# Patient Record
Sex: FEMALE | Race: WHITE | Employment: OTHER | URBAN - METROPOLITAN AREA
[De-identification: names, ages, dates, MRNs, and addresses within clinical notes are randomized per-mention and may not be internally consistent; named-entity substitution may affect disease eponyms.]

---

## 2017-01-03 PROBLEM — E87.6 HYPOKALEMIA: Status: ACTIVE | Noted: 2017-01-03

## 2018-03-28 PROBLEM — M25.551 PAIN OF RIGHT HIP JOINT: Status: ACTIVE | Noted: 2018-03-28

## 2018-04-24 PROBLEM — M81.0 POST-MENOPAUSAL OSTEOPOROSIS: Status: ACTIVE | Noted: 2018-04-24

## 2018-12-18 RX ORDER — LEVOTHYROXINE SODIUM 0.1 MG/1
TABLET ORAL
Qty: 30 TABLET | Refills: 5 | Status: SHIPPED | OUTPATIENT
Start: 2018-12-18 | End: 2019-08-26 | Stop reason: SDUPTHER

## 2019-01-16 ENCOUNTER — TELEPHONE (OUTPATIENT)
Dept: PRIMARY CARE CLINIC | Age: 59
End: 2019-01-16

## 2019-01-16 DIAGNOSIS — F43.21 GRIEF REACTION: ICD-10-CM

## 2019-01-16 DIAGNOSIS — F43.29 STRESS AND ADJUSTMENT REACTION: Primary | ICD-10-CM

## 2019-01-16 RX ORDER — BUSPIRONE HYDROCHLORIDE 10 MG/1
10 TABLET ORAL 2 TIMES DAILY
Qty: 20 TABLET | Refills: 0 | Status: SHIPPED | OUTPATIENT
Start: 2019-01-16 | End: 2019-01-26

## 2019-01-17 RX ORDER — ALPRAZOLAM 0.25 MG/1
0.25 TABLET ORAL 3 TIMES DAILY PRN
Qty: 20 TABLET | Refills: 0 | Status: SHIPPED | OUTPATIENT
Start: 2019-01-17 | End: 2019-01-24

## 2019-03-01 DIAGNOSIS — E55.9 VITAMIN D DEFICIENCY: ICD-10-CM

## 2019-03-04 RX ORDER — ERGOCALCIFEROL (VITAMIN D2) 50 MCG
1 CAPSULE ORAL DAILY
Qty: 30 CAPSULE | Refills: 5 | Status: SHIPPED | OUTPATIENT
Start: 2019-03-04 | End: 2019-10-03 | Stop reason: SDUPTHER

## 2019-03-04 RX ORDER — ERGOCALCIFEROL 1.25 MG/1
50000 CAPSULE ORAL WEEKLY
Qty: 4 CAPSULE | Refills: 3 | OUTPATIENT
Start: 2019-03-04

## 2019-03-07 DIAGNOSIS — I10 ESSENTIAL HYPERTENSION: ICD-10-CM

## 2019-03-12 RX ORDER — DILTIAZEM HYDROCHLORIDE 180 MG/1
CAPSULE, EXTENDED RELEASE ORAL
Qty: 60 CAPSULE | Refills: 3 | Status: SHIPPED | OUTPATIENT
Start: 2019-03-12 | End: 2019-07-09 | Stop reason: SDUPTHER

## 2019-04-04 RX ORDER — LISINOPRIL 20 MG/1
20 TABLET ORAL DAILY
Qty: 30 TABLET | Refills: 3 | Status: SHIPPED | OUTPATIENT
Start: 2019-04-04 | End: 2019-07-25 | Stop reason: SDUPTHER

## 2019-04-10 NOTE — TELEPHONE ENCOUNTER
Last visit: 10/02/18   Last Med refill: 12/18/18 #30 5 refills   Does patient have enough medication for 72 hours  Next Visit Date:  No future appointments.     Health Maintenance   Topic Date Due    DTaP/Tdap/Td vaccine (1 - Tdap) 07/13/1980    Cervical cancer screen  07/13/1982    Shingles Vaccine (1 of 2) 07/13/2011    TSH testing  04/16/2019    Potassium monitoring  04/16/2019    Creatinine monitoring  04/16/2019    Breast cancer screen  04/13/2020    Lipid screen  10/19/2023    Colon cancer screen colonoscopy  07/09/2028    Flu vaccine  Completed    Hepatitis C screen  Completed    HIV screen  Completed    Pneumococcal 0-64 years Vaccine  Aged Out       No results found for: LABA1C          ( goal A1C is < 7)   No results found for: LABMICR  LDL Calculated (mg/dL)   Date Value   10/19/2018 71       (goal LDL is <100)   BUN (mg/dL)   Date Value   10/23/2014 12     BP Readings from Last 3 Encounters:   10/02/18 124/78   04/24/18 116/78   03/28/18 110/74          (goal 120/80)    All Future Testing planned in [facility]  Lab Frequency Next Occurrence               Patient Active Problem List:     Cervical stenosis of spine     Essential hypertension     Hypothyroidism     Hypothyroidism     Lumbar radiculopathy, chronic     Lumbar neuritis     Post laminectomy syndrome     Hypokalemia     Pain of right hip joint     Post-menopausal osteoporosis

## 2019-04-11 RX ORDER — LEVOTHYROXINE SODIUM 88 UG/1
TABLET ORAL
Qty: 8 TABLET | Refills: 3 | Status: SHIPPED | OUTPATIENT
Start: 2019-04-11 | End: 2019-08-03 | Stop reason: SDUPTHER

## 2019-07-25 ENCOUNTER — OFFICE VISIT (OUTPATIENT)
Dept: PRIMARY CARE CLINIC | Age: 59
End: 2019-07-25
Payer: MEDICARE

## 2019-07-25 VITALS
SYSTOLIC BLOOD PRESSURE: 110 MMHG | BODY MASS INDEX: 20.32 KG/M2 | WEIGHT: 110.4 LBS | OXYGEN SATURATION: 97 % | HEART RATE: 101 BPM | DIASTOLIC BLOOD PRESSURE: 66 MMHG | HEIGHT: 62 IN

## 2019-07-25 DIAGNOSIS — I10 ESSENTIAL HYPERTENSION: Primary | ICD-10-CM

## 2019-07-25 DIAGNOSIS — E55.9 VITAMIN D DEFICIENCY: ICD-10-CM

## 2019-07-25 DIAGNOSIS — R63.4 WEIGHT LOSS: ICD-10-CM

## 2019-07-25 DIAGNOSIS — E03.9 HYPOTHYROIDISM, UNSPECIFIED TYPE: ICD-10-CM

## 2019-07-25 DIAGNOSIS — Z23 IMMUNIZATION DUE: ICD-10-CM

## 2019-07-25 DIAGNOSIS — Z12.39 BREAST CANCER SCREENING: ICD-10-CM

## 2019-07-25 DIAGNOSIS — R05.9 COUGH: ICD-10-CM

## 2019-07-25 PROCEDURE — 90471 IMMUNIZATION ADMIN: CPT | Performed by: FAMILY MEDICINE

## 2019-07-25 PROCEDURE — 1036F TOBACCO NON-USER: CPT | Performed by: FAMILY MEDICINE

## 2019-07-25 PROCEDURE — 90715 TDAP VACCINE 7 YRS/> IM: CPT | Performed by: FAMILY MEDICINE

## 2019-07-25 PROCEDURE — 99214 OFFICE O/P EST MOD 30 MIN: CPT | Performed by: FAMILY MEDICINE

## 2019-07-25 PROCEDURE — G8420 CALC BMI NORM PARAMETERS: HCPCS | Performed by: FAMILY MEDICINE

## 2019-07-25 PROCEDURE — 3017F COLORECTAL CA SCREEN DOC REV: CPT | Performed by: FAMILY MEDICINE

## 2019-07-25 PROCEDURE — G8427 DOCREV CUR MEDS BY ELIG CLIN: HCPCS | Performed by: FAMILY MEDICINE

## 2019-07-25 RX ORDER — LISINOPRIL 10 MG/1
10 TABLET ORAL DAILY
Qty: 30 TABLET | Refills: 5 | Status: SHIPPED | OUTPATIENT
Start: 2019-07-25 | End: 2020-01-30 | Stop reason: SDUPTHER

## 2019-07-25 ASSESSMENT — ENCOUNTER SYMPTOMS: SHORTNESS OF BREATH: 0

## 2019-07-25 NOTE — PATIENT INSTRUCTIONS
Patient Education        Eating Healthy Foods: Care Instructions  Your Care Instructions    Eating healthy foods can help lower your risk for disease. Healthy food gives you energy and keeps your heart strong, your brain active, your muscles working, and your bones strong. A healthy diet includes a variety of foods from the basic food groups: grains, vegetables, fruits, milk and milk products, and meat and beans. Some people may eat more of their favorite foods from only one food group and, as a result, miss getting the nutrients they need. So, it is important to pay attention not only to what you eat but also to what you are missing from your diet. You can eat a healthy, balanced diet by making a few small changes. Follow-up care is a key part of your treatment and safety. Be sure to make and go to all appointments, and call your doctor if you are having problems. It's also a good idea to know your test results and keep a list of the medicines you take. How can you care for yourself at home? Look at what you eat  · Keep a food diary for a week or two and record everything you eat or drink. Track the number of servings you eat from each food group. · For a balanced diet every day, eat a variety of:  ? 6 or more ounce-equivalents of grains, such as cereals, breads, crackers, rice, or pasta, every day. An ounce-equivalent is 1 slice of bread, 1 cup of ready-to-eat cereal, or ½ cup of cooked rice, cooked pasta, or cooked cereal.  ? 2½ cups of vegetables, especially:  § Dark-green vegetables such as broccoli and spinach. § Orange vegetables such as carrots and sweet potatoes. § Dry beans (such as kitchen and kidney beans) and peas (such as lentils). ? 2 cups of fresh, frozen, or canned fruit. A small apple or 1 banana or orange equals 1 cup. ? 3 cups of nonfat or low-fat milk, yogurt, or other milk products. ? 5½ ounces of meat and beans, such as chicken, fish, lean meat, beans, nuts, and seeds.  One egg, 1

## 2019-07-25 NOTE — PROGRESS NOTES
Pulse 101   Ht 5' 1.5\" (1.562 m)   Wt 110 lb 6.4 oz (50.1 kg)   SpO2 97%   BMI 20.52 kg/m²   Physical Exam   Constitutional: She is oriented to person, place, and time. She appears well-developed and well-nourished. No distress. HENT:   Head: Normocephalic and atraumatic. Right Ear: External ear normal.   Left Ear: External ear normal.   Mouth/Throat: Oropharynx is clear and moist.   Eyes: Pupils are equal, round, and reactive to light. Conjunctivae are normal. Right eye exhibits no discharge. Left eye exhibits no discharge. No scleral icterus. Neck: No tracheal deviation present. No thyromegaly present. Cardiovascular: Normal rate, regular rhythm and normal heart sounds. No carotid bruits   Pulmonary/Chest: Effort normal and breath sounds normal. No respiratory distress. She has no wheezes. Musculoskeletal: She exhibits no edema. Kyphosis, increased cervical lordosis   Lymphadenopathy:     She has no cervical adenopathy. Neurological: She is alert and oriented to person, place, and time. Skin: Skin is warm. No rash noted. Psychiatric: She has a normal mood and affect. Her behavior is normal. Thought content normal.   Nursing note and vitals reviewed. Assessment:       Diagnosis Orders   1. Essential hypertension  Basic Metabolic Panel, Fasting    lisinopril (PRINIVIL;ZESTRIL) 10 MG tablet    XR CHEST STANDARD (2 VW)   2. Vitamin D deficiency  Vitamin D 25 Hydroxy   3. Hypothyroidism, unspecified type  TSH 3RD GENERATION    T4, Free   4. Cough  XR CHEST STANDARD (2 VW)   5. Breast cancer screening  KOLE DIGITAL SCREEN UNILATERAL LEFT   6. Weight loss          Plan:      No follow-ups on file. Decrease lisinopril dose  Check cxr for cough and weight loss   Monitor weight and BP at pain clinic  Recheck 3 months.    Orders Placed This Encounter   Procedures    KOLE DIGITAL SCREEN UNILATERAL LEFT     Standing Status:   Future     Standing Expiration Date:   9/25/2020    XR CHEST STANDARD (2

## 2019-08-04 DIAGNOSIS — I10 ESSENTIAL HYPERTENSION: ICD-10-CM

## 2019-08-05 RX ORDER — DILTIAZEM HYDROCHLORIDE 180 MG/1
CAPSULE, EXTENDED RELEASE ORAL
Qty: 60 CAPSULE | Refills: 0 | Status: SHIPPED | OUTPATIENT
Start: 2019-08-05 | End: 2019-09-08 | Stop reason: SDUPTHER

## 2019-08-26 RX ORDER — LEVOTHYROXINE SODIUM 0.1 MG/1
TABLET ORAL
Qty: 30 TABLET | Refills: 4 | Status: SHIPPED | OUTPATIENT
Start: 2019-08-26 | End: 2019-09-05 | Stop reason: SDUPTHER

## 2019-08-30 LAB
ANION GAP SERPL CALCULATED.3IONS-SCNC: NORMAL MMOL/L
BUN BLDV-MCNC: NORMAL MG/DL
BUN/CREAT BLD: NORMAL
CALCIUM SERPL-MCNC: NORMAL MG/DL
CHLORIDE BLD-SCNC: NORMAL MMOL/L
CO2: NORMAL MMOL/L
CREAT SERPL-MCNC: NORMAL MG/DL
GFR AFRICAN AMERICAN: NORMAL
GFR NON-AFRICAN AMERICAN: NORMAL
GLUCOSE FASTING: NORMAL MG/DL
POTASSIUM SERPL-SCNC: NORMAL MMOL/L
SODIUM BLD-SCNC: NORMAL MMOL/L
T4 FREE: NORMAL
VITAMIN D 25-HYDROXY: NORMAL
VITAMIN D2, 25 HYDROXY: NORMAL
VITAMIN D3,25 HYDROXY: NORMAL

## 2019-09-04 DIAGNOSIS — E55.9 VITAMIN D DEFICIENCY: ICD-10-CM

## 2019-09-04 DIAGNOSIS — R05.9 COUGH: ICD-10-CM

## 2019-09-04 DIAGNOSIS — I10 ESSENTIAL HYPERTENSION: ICD-10-CM

## 2019-09-04 DIAGNOSIS — E03.9 HYPOTHYROIDISM, UNSPECIFIED TYPE: ICD-10-CM

## 2019-09-05 DIAGNOSIS — Z12.39 BREAST CANCER SCREENING: ICD-10-CM

## 2019-09-05 RX ORDER — LEVOTHYROXINE SODIUM 88 UG/1
88 TABLET ORAL DAILY
Qty: 30 TABLET | Refills: 3 | Status: SHIPPED | OUTPATIENT
Start: 2019-09-05 | End: 2019-10-31 | Stop reason: SDUPTHER

## 2019-10-03 RX ORDER — ERGOCALCIFEROL (VITAMIN D2) 50 MCG
CAPSULE ORAL
Qty: 30 CAPSULE | Refills: 4 | Status: SHIPPED | OUTPATIENT
Start: 2019-10-03 | End: 2020-08-05 | Stop reason: SDUPTHER

## 2019-10-31 ENCOUNTER — TELEPHONE (OUTPATIENT)
Dept: PRIMARY CARE CLINIC | Age: 59
End: 2019-10-31

## 2019-10-31 RX ORDER — LEVOTHYROXINE SODIUM 88 UG/1
88 TABLET ORAL DAILY
Qty: 30 TABLET | Refills: 3 | Status: SHIPPED | OUTPATIENT
Start: 2019-10-31 | End: 2020-05-09 | Stop reason: SDUPTHER

## 2019-11-25 ENCOUNTER — OFFICE VISIT (OUTPATIENT)
Dept: PRIMARY CARE CLINIC | Age: 59
End: 2019-11-25
Payer: MEDICARE

## 2019-11-25 VITALS
DIASTOLIC BLOOD PRESSURE: 70 MMHG | OXYGEN SATURATION: 98 % | WEIGHT: 112 LBS | HEART RATE: 108 BPM | BODY MASS INDEX: 20.82 KG/M2 | SYSTOLIC BLOOD PRESSURE: 118 MMHG

## 2019-11-25 DIAGNOSIS — Z23 IMMUNIZATION DUE: ICD-10-CM

## 2019-11-25 DIAGNOSIS — E03.9 HYPOTHYROIDISM, UNSPECIFIED TYPE: ICD-10-CM

## 2019-11-25 DIAGNOSIS — I10 ESSENTIAL HYPERTENSION: Primary | ICD-10-CM

## 2019-11-25 PROCEDURE — G8420 CALC BMI NORM PARAMETERS: HCPCS | Performed by: FAMILY MEDICINE

## 2019-11-25 PROCEDURE — G8482 FLU IMMUNIZE ORDER/ADMIN: HCPCS | Performed by: FAMILY MEDICINE

## 2019-11-25 PROCEDURE — 90686 IIV4 VACC NO PRSV 0.5 ML IM: CPT | Performed by: FAMILY MEDICINE

## 2019-11-25 PROCEDURE — G8427 DOCREV CUR MEDS BY ELIG CLIN: HCPCS | Performed by: FAMILY MEDICINE

## 2019-11-25 PROCEDURE — 1036F TOBACCO NON-USER: CPT | Performed by: FAMILY MEDICINE

## 2019-11-25 PROCEDURE — 99213 OFFICE O/P EST LOW 20 MIN: CPT | Performed by: FAMILY MEDICINE

## 2019-11-25 PROCEDURE — G0008 ADMIN INFLUENZA VIRUS VAC: HCPCS | Performed by: FAMILY MEDICINE

## 2019-11-25 PROCEDURE — 3017F COLORECTAL CA SCREEN DOC REV: CPT | Performed by: FAMILY MEDICINE

## 2019-11-25 ASSESSMENT — ENCOUNTER SYMPTOMS: SHORTNESS OF BREATH: 0

## 2019-11-25 ASSESSMENT — PATIENT HEALTH QUESTIONNAIRE - PHQ9
1. LITTLE INTEREST OR PLEASURE IN DOING THINGS: 1
SUM OF ALL RESPONSES TO PHQ9 QUESTIONS 1 & 2: 2
SUM OF ALL RESPONSES TO PHQ QUESTIONS 1-9: 2
SUM OF ALL RESPONSES TO PHQ QUESTIONS 1-9: 2
2. FEELING DOWN, DEPRESSED OR HOPELESS: 1

## 2020-01-10 ENCOUNTER — TELEPHONE (OUTPATIENT)
Dept: PRIMARY CARE CLINIC | Age: 60
End: 2020-01-10

## 2020-01-14 RX ORDER — DILTIAZEM HYDROCHLORIDE 180 MG/1
CAPSULE, EXTENDED RELEASE ORAL
Qty: 60 CAPSULE | Refills: 3 | Status: SHIPPED | OUTPATIENT
Start: 2020-01-14 | End: 2020-05-07

## 2020-01-30 RX ORDER — LISINOPRIL 10 MG/1
10 TABLET ORAL DAILY
Qty: 30 TABLET | Refills: 5 | Status: SHIPPED | OUTPATIENT
Start: 2020-01-30 | End: 2020-07-25 | Stop reason: DRUGHIGH

## 2020-03-16 ENCOUNTER — TELEPHONE (OUTPATIENT)
Dept: PRIMARY CARE CLINIC | Age: 60
End: 2020-03-16

## 2020-03-16 NOTE — TELEPHONE ENCOUNTER
----- Message from Pam Castro sent at 1/15/2020  1:37 PM CST -----  Contact: pt  Patient calling to speak with nurse     Call Back : [phone]   Pt notified and states that she will wait and talk to Dr. Jose Manuel Garcia at her appointment on 4/1

## 2020-03-16 NOTE — TELEPHONE ENCOUNTER
Pt is calling and states she would like a call back from you personally. I let the patient know you were out of the office. Pt would not discuss anything with me, she just stated that Alec Carreon is going to fall apart\". I told the patient I did not know when she was going to hear back from you. Pt understood.

## 2020-03-30 ENCOUNTER — TELEPHONE (OUTPATIENT)
Dept: ADMINISTRATIVE | Age: 60
End: 2020-03-30

## 2020-03-30 RX ORDER — BUSPIRONE HYDROCHLORIDE 10 MG/1
10 TABLET ORAL 3 TIMES DAILY
Qty: 90 TABLET | Refills: 0 | OUTPATIENT
Start: 2020-03-30 | End: 2020-03-31 | Stop reason: SDUPTHER

## 2020-03-31 RX ORDER — BUSPIRONE HYDROCHLORIDE 10 MG/1
10 TABLET ORAL 3 TIMES DAILY
Qty: 90 TABLET | Refills: 0 | Status: SHIPPED | OUTPATIENT
Start: 2020-03-31 | End: 2020-05-05

## 2020-04-29 ENCOUNTER — TELEPHONE (OUTPATIENT)
Dept: PRIMARY CARE CLINIC | Age: 60
End: 2020-04-29

## 2020-04-30 ENCOUNTER — TELEPHONE (OUTPATIENT)
Dept: PRIMARY CARE CLINIC | Age: 60
End: 2020-04-30

## 2020-05-09 RX ORDER — LEVOTHYROXINE SODIUM 88 UG/1
88 TABLET ORAL DAILY
Qty: 14 TABLET | Refills: 0 | Status: SHIPPED | OUTPATIENT
Start: 2020-05-09 | End: 2020-05-28 | Stop reason: SDUPTHER

## 2020-05-28 RX ORDER — LEVOTHYROXINE SODIUM 88 UG/1
88 TABLET ORAL DAILY
Qty: 30 TABLET | Refills: 1 | Status: SHIPPED | OUTPATIENT
Start: 2020-05-28 | End: 2020-10-12 | Stop reason: SDUPTHER

## 2020-06-19 RX ORDER — DILTIAZEM HYDROCHLORIDE 180 MG/1
180 CAPSULE, EXTENDED RELEASE ORAL 2 TIMES DAILY
Qty: 60 CAPSULE | Refills: 1 | Status: SHIPPED | OUTPATIENT
Start: 2020-06-19 | End: 2020-07-25 | Stop reason: SDUPTHER

## 2020-06-30 ENCOUNTER — TELEPHONE (OUTPATIENT)
Dept: PRIMARY CARE CLINIC | Age: 60
End: 2020-06-30

## 2020-07-06 RX ORDER — BUSPIRONE HYDROCHLORIDE 10 MG/1
TABLET ORAL
Qty: 90 TABLET | Refills: 0 | Status: SHIPPED | OUTPATIENT
Start: 2020-07-06 | End: 2020-08-21

## 2020-07-07 ENCOUNTER — TELEPHONE (OUTPATIENT)
Dept: PRIMARY CARE CLINIC | Age: 60
End: 2020-07-07

## 2020-07-23 LAB
ALBUMIN SERPL-MCNC: NORMAL G/DL
ALP BLD-CCNC: NORMAL U/L
ALT SERPL-CCNC: NORMAL U/L
AST SERPL-CCNC: NORMAL U/L
BILIRUB SERPL-MCNC: NORMAL MG/DL
BUN BLDV-MCNC: NORMAL MG/DL
CALCIUM SERPL-MCNC: NORMAL MG/DL
CHLORIDE BLD-SCNC: NORMAL MMOL/L
CHOLESTEROL, TOTAL: 198 MG/DL
CHOLESTEROL/HDL RATIO: 1.9
CO2: NORMAL
CREAT SERPL-MCNC: NORMAL MG/DL
GLUCOSE FASTING: 109 MG/DL
HDLC SERPL-MCNC: 104 MG/DL (ref 35–70)
LDL CHOLESTEROL CALCULATED: 75 MG/DL (ref 0–160)
POTASSIUM SERPL-SCNC: NORMAL MMOL/L
SODIUM BLD-SCNC: NORMAL MMOL/L
T4 FREE: NORMAL
TOTAL PROTEIN: NORMAL
TRIGL SERPL-MCNC: 96 MG/DL
VLDLC SERPL CALC-MCNC: 19 MG/DL

## 2020-07-25 ENCOUNTER — OFFICE VISIT (OUTPATIENT)
Dept: PRIMARY CARE CLINIC | Age: 60
End: 2020-07-25
Payer: MEDICARE

## 2020-07-25 VITALS
WEIGHT: 97.4 LBS | OXYGEN SATURATION: 97 % | HEART RATE: 130 BPM | DIASTOLIC BLOOD PRESSURE: 70 MMHG | BODY MASS INDEX: 18.11 KG/M2 | TEMPERATURE: 97.8 F | SYSTOLIC BLOOD PRESSURE: 110 MMHG

## 2020-07-25 PROCEDURE — G8419 CALC BMI OUT NRM PARAM NOF/U: HCPCS | Performed by: FAMILY MEDICINE

## 2020-07-25 PROCEDURE — 93000 ELECTROCARDIOGRAM COMPLETE: CPT | Performed by: FAMILY MEDICINE

## 2020-07-25 PROCEDURE — 99214 OFFICE O/P EST MOD 30 MIN: CPT | Performed by: FAMILY MEDICINE

## 2020-07-25 PROCEDURE — 3017F COLORECTAL CA SCREEN DOC REV: CPT | Performed by: FAMILY MEDICINE

## 2020-07-25 PROCEDURE — G8427 DOCREV CUR MEDS BY ELIG CLIN: HCPCS | Performed by: FAMILY MEDICINE

## 2020-07-25 PROCEDURE — 1036F TOBACCO NON-USER: CPT | Performed by: FAMILY MEDICINE

## 2020-07-25 RX ORDER — LISINOPRIL 5 MG/1
5 TABLET ORAL DAILY
Qty: 30 TABLET | Refills: 2 | Status: SHIPPED | OUTPATIENT
Start: 2020-07-25 | End: 2020-08-05 | Stop reason: SDUPTHER

## 2020-07-25 RX ORDER — DILTIAZEM HYDROCHLORIDE 120 MG/1
120 CAPSULE, EXTENDED RELEASE ORAL 2 TIMES DAILY
Qty: 30 CAPSULE | Refills: 2 | Status: SHIPPED
Start: 2020-07-25 | End: 2020-09-18 | Stop reason: SDUPTHER

## 2020-07-25 ASSESSMENT — ENCOUNTER SYMPTOMS
BACK PAIN: 1
SHORTNESS OF BREATH: 1

## 2020-07-25 ASSESSMENT — PATIENT HEALTH QUESTIONNAIRE - PHQ9
2. FEELING DOWN, DEPRESSED OR HOPELESS: 0
SUM OF ALL RESPONSES TO PHQ QUESTIONS 1-9: 0
SUM OF ALL RESPONSES TO PHQ9 QUESTIONS 1 & 2: 0
SUM OF ALL RESPONSES TO PHQ QUESTIONS 1-9: 0
1. LITTLE INTEREST OR PLEASURE IN DOING THINGS: 0

## 2020-07-25 NOTE — PROGRESS NOTES
Sandy Iqbal a 62 y.o. female who presents today for her medical conditions/complaints as notedbelow. Chief Complaint   Patient presents with    Medication Check    Fatigue    Weight Loss    Medication Refill     Pending          HPI:   HPI      No apettie, not feeling well x 6 weeks   Lot of weakness in arms and legs. If she uses her arms too much she can't lift her head. X 6 weeks. BM : every 7 days. Sometimes upper abdomen pain  Vomits, up phlegm at times x 6 weeks. Every morning. Gets full fast.   Dizzy a lot. Off balance    Off balance      LDL Calculated (mg/dL)   Date Value   2020 75   10/19/2018 71       (goal LDL is <100)   BUN (mg/dL)   Date Value   10/23/2014 12     BP Readings from Last 3 Encounters:   20 110/70   19 118/70   19 110/66          (goal 120/80)    Past Medical History:   Diagnosis Date    Arthritis     Cancer ([address]     right breast    GERD (gastroesophageal reflux disease)     Hypertension     Lumbar neuritis 2016    Nausea & vomiting     Post laminectomy syndrome 2016    Stress incontinence     Thyroid disease       Past Surgical History:   Procedure Laterality Date    BACK SURGERY      x 2    BREAST SURGERY Right     mastectomy with reconstruction    CERVICAL LAMINECTOMY  2014    cervicle laminectomy c4-c6    HERNIA REPAIR Bilateral     inguinal    HYSTERECTOMY, TOTAL ABDOMINAL      MASTECTOMY, RADICAL         History reviewed. No pertinent family history. Social History     Tobacco Use    Smoking status: Former Smoker     Packs/day: 0.50     Years: 30.00     Pack years: 15.00     Last attempt to quit:      Years since quittin.5    Smokeless tobacco: Never Used   Substance Use Topics    Alcohol use:  Yes     Alcohol/week: 0.0 standard drinks     Comment: 3 whiskey drinks per day      Current Outpatient Medications   Medication Sig Dispense Refill    dilTIAZem (TIAZAC) 120 MG extended release capsule Take 1 capsule by mouth 2 times daily 30 capsule 2    lisinopril (PRINIVIL;ZESTRIL) 5 MG tablet Take 1 tablet by mouth daily 30 tablet 2    busPIRone (BUSPAR) 10 MG tablet TAKE ONE TABLET BY MOUTH THREE TIMES A DAY 90 tablet 0    levothyroxine (SYNTHROID) 88 MCG tablet Take 1 tablet by mouth Daily 30 tablet 1    Potassium 99 MG TABS Take 1 tablet by mouth daily 90 tablet 0    tiZANidine (ZANAFLEX) 2 MG capsule Take 1 capsule by mouth nightly From Pain management 30 capsule 0    HYDROcodone-acetaminophen (NORCO) 7.5-325 MG per tablet       methadone (DOLOPHINE) 10 MG tablet Take 10 mg by mouth 2 times daily.  Vitamin D, Ergocalciferol, 2000 units CAPS TAKE ONE CAQPSULE BY MOUTH DAILY (Patient not taking: Reported on 7/25/2020) 30 capsule 4     No current facility-administered medications for this visit. Allergies   Allergen Reactions    Latex Hives, Itching, Dermatitis and Rash    Kiwi Extract      Other reaction(s): Facial Swelling    Lortab [Hydrocodone-Acetaminophen]        Health Maintenance   Topic Date Due    Shingles Vaccine (1 of 2) 07/13/2011    Annual Wellness Visit (AWV)  06/23/2019    Flu vaccine (1) 09/01/2020    TSH testing  07/23/2021    Potassium monitoring  07/23/2021    Creatinine monitoring  07/23/2021    Breast cancer screen  08/30/2021    Lipid screen  07/23/2025    Colon cancer screen colonoscopy  07/09/2028    DTaP/Tdap/Td vaccine (2 - Td) 07/25/2029    Hepatitis C screen  Completed    HIV screen  Completed    Hepatitis A vaccine  Aged Out    Hepatitis B vaccine  Aged Out    Hib vaccine  Aged Out    Meningococcal (ACWY) vaccine  Aged Out    Pneumococcal 0-64 years Vaccine  Aged Out       Subjective:      Review of Systems   Constitutional: Positive for fatigue. Respiratory: Positive for shortness of breath. Cardiovascular: Negative. Musculoskeletal: Positive for arthralgias and back pain.      Patient states she drinks 3 whiskey/7-up per Thought content normal.         Assessment:       Diagnosis Orders   1. Tachycardia  EKG 12 Lead    EKG 12 Lead   2. Essential hypertension  dilTIAZem (TIAZAC) 120 MG extended release capsule    lisinopril (PRINIVIL;ZESTRIL) 5 MG tablet   3. Weight loss, abnormal          Plan:      No follow-ups on file.   Admit to hospital    Orders Placed This Encounter   Procedures    EKG 12 Lead     Standing Status:   Future     Number of Occurrences:   1     Standing Expiration Date:   7/25/2021     Order Specific Question:   Reason for Exam?     Answer:   Irregular heart rate     Orders Placed This Encounter   Medications    dilTIAZem (TIAZAC) 120 MG extended release capsule     Sig: Take 1 capsule by mouth 2 times daily     Dispense:  30 capsule     Refill:  2    lisinopril (PRINIVIL;ZESTRIL) 5 MG tablet     Sig: Take 1 tablet by mouth daily     Dispense:  30 tablet     Refill:  2       admit  Electronicallysigned by Javier Yee MD on 7/25/2020 at 10:53 AM

## 2020-07-30 ENCOUNTER — TELEPHONE (OUTPATIENT)
Dept: PRIMARY CARE CLINIC | Age: 60
End: 2020-07-30

## 2020-07-31 ENCOUNTER — TELEPHONE (OUTPATIENT)
Dept: PRIMARY CARE CLINIC | Age: 60
End: 2020-07-31

## 2020-08-05 ENCOUNTER — OFFICE VISIT (OUTPATIENT)
Dept: PRIMARY CARE CLINIC | Age: 60
End: 2020-08-05
Payer: MEDICARE

## 2020-08-05 VITALS
DIASTOLIC BLOOD PRESSURE: 60 MMHG | OXYGEN SATURATION: 98 % | SYSTOLIC BLOOD PRESSURE: 110 MMHG | TEMPERATURE: 97.3 F | WEIGHT: 101.8 LBS | BODY MASS INDEX: 18.92 KG/M2 | HEART RATE: 100 BPM

## 2020-08-05 PROBLEM — R63.4 WEIGHT LOSS, ABNORMAL: Status: ACTIVE | Noted: 2020-07-27

## 2020-08-05 PROBLEM — F11.20 OPIOID DEPENDENCE (HCC): Status: ACTIVE | Noted: 2020-08-05

## 2020-08-05 PROBLEM — K29.50 CHRONIC GASTRITIS WITHOUT BLEEDING: Status: ACTIVE | Noted: 2020-07-30

## 2020-08-05 PROBLEM — R13.19 ESOPHAGEAL DYSPHAGIA: Status: ACTIVE | Noted: 2020-07-27

## 2020-08-05 PROBLEM — I95.2 HYPOTENSION DUE TO DRUGS: Status: ACTIVE | Noted: 2020-07-27

## 2020-08-05 PROBLEM — K22.10 ULCERATIVE ESOPHAGITIS: Status: ACTIVE | Noted: 2020-07-30

## 2020-08-05 PROBLEM — R97.0 ELEVATED CEA: Status: ACTIVE | Noted: 2020-07-27

## 2020-08-05 PROBLEM — E44.0 MODERATE MALNUTRITION (HCC): Status: ACTIVE | Noted: 2020-08-05

## 2020-08-05 PROCEDURE — 1111F DSCHRG MED/CURRENT MED MERGE: CPT | Performed by: FAMILY MEDICINE

## 2020-08-05 PROCEDURE — 99495 TRANSJ CARE MGMT MOD F2F 14D: CPT | Performed by: FAMILY MEDICINE

## 2020-08-05 RX ORDER — PANTOPRAZOLE SODIUM 40 MG/1
40 TABLET, DELAYED RELEASE ORAL
COMMUNITY
Start: 2020-07-29 | End: 2020-09-18 | Stop reason: SDUPTHER

## 2020-08-05 RX ORDER — ERGOCALCIFEROL (VITAMIN D2) 50 MCG
1 CAPSULE ORAL DAILY
Qty: 90 CAPSULE | Refills: 1 | Status: SHIPPED | OUTPATIENT
Start: 2020-08-05 | End: 2021-03-29

## 2020-08-05 RX ORDER — LUBIPROSTONE 24 UG/1
24 CAPSULE, GELATIN COATED ORAL 2 TIMES DAILY WITH MEALS
Status: ON HOLD | COMMUNITY
Start: 2020-07-29 | End: 2021-11-21

## 2020-08-05 RX ORDER — LISINOPRIL 2.5 MG/1
2.5 TABLET ORAL DAILY
Qty: 30 TABLET | Refills: 1 | Status: SHIPPED | OUTPATIENT
Start: 2020-08-05 | End: 2020-09-18 | Stop reason: ALTCHOICE

## 2020-08-05 RX ORDER — DILTIAZEM HYDROCHLORIDE 120 MG/1
120 TABLET, FILM COATED ORAL 2 TIMES DAILY
COMMUNITY
Start: 2020-07-27 | End: 2020-09-18 | Stop reason: SDUPTHER

## 2020-08-05 RX ORDER — POTASSIUM CHLORIDE 750 MG/1
10 TABLET, FILM COATED, EXTENDED RELEASE ORAL 2 TIMES DAILY
Status: ON HOLD | COMMUNITY
Start: 2020-07-27 | End: 2021-11-21

## 2020-08-05 ASSESSMENT — ENCOUNTER SYMPTOMS
RESPIRATORY NEGATIVE: 1
CONSTIPATION: 1

## 2020-08-05 NOTE — PROGRESS NOTES
Post-Discharge Transitional Care Management Services or Hospital Follow Up      Sonja Block   YOB: 1960    Date of Office Visit:  8/5/2020  Date of Hospital Admission: 11/4/14  Date of Hospital Discharge: 11/5/14  Readmission Risk Score(high >=14%.  Medium >=10%):No data recorded    Care management risk score Rising risk (score 2-5) and Complex Care (Scores >=6): 1     Non face to face  following discharge, date last encounter closed (first attempt may have been earlier): 7/31/2020  4:57 PM 7/31/2020  4:57 PM    Call initiated 2 business days of discharge: Yes     Patient Active Problem List   Diagnosis    Cervical stenosis of spine    Essential hypertension    Hypothyroidism    Lumbar radiculopathy, chronic    Lumbar neuritis    Post laminectomy syndrome    Hypokalemia    Pain of right hip joint    Post-menopausal osteoporosis    Moderate malnutrition (HCC)    Chronic gastritis without bleeding    Elevated CEA    Esophageal dysphagia    Hypotension due to drugs    Opioid dependence (HCC)    Ulcerative esophagitis    Weight loss, abnormal       Allergies   Allergen Reactions    Latex Hives, Itching, Dermatitis and Rash    Kiwi Extract      Other reaction(s): Facial Swelling    Lortab [Hydrocodone-Acetaminophen]        Medications listed as ordered at the time of discharge from [facility] Medication Instructions CAIT:    Printed on:08/05/20 1007   Medication Information                      busPIRone (BUSPAR) 10 MG tablet  TAKE ONE TABLET BY MOUTH THREE TIMES A DAY             dilTIAZem (CARDIZEM) 120 MG tablet  Take 120 mg by mouth 2 times daily             dilTIAZem (TIAZAC) 120 MG extended release capsule  Take 1 capsule by mouth 2 times daily             HYDROcodone-acetaminophen (NORCO) 7.5-325 MG per tablet               levothyroxine (SYNTHROID) 88 MCG tablet  Take 1 tablet by mouth Daily             lisinopril (PRINIVIL;ZESTRIL) 5 MG tablet  Take 1 tablet by mouth daily             lubiprostone (AMITIZA) 24 MCG capsule  Take 24 mcg by mouth 2 times daily (with meals)             methadone (DOLOPHINE) 10 MG tablet  Take 10 mg by mouth 2 times daily. pantoprazole (PROTONIX) 40 MG tablet  Take 40 mg by mouth every morning (before breakfast)             Potassium 99 MG TABS  Take 1 tablet by mouth daily             potassium chloride (K-TAB) 10 MEQ extended release tablet  Take 10 mEq by mouth 2 times daily             tiZANidine (ZANAFLEX) 2 MG capsule  Take 1 capsule by mouth nightly From Pain management             Vitamin D, Ergocalciferol, 50 MCG (2000 UT) CAPS  Take 1 capsule by mouth daily                   Medications marked \"taking\" at this time  Outpatient Medications Marked as Taking for the 8/5/20 encounter (Office Visit) with Meghan Andrade MD   Medication Sig Dispense Refill    pantoprazole (PROTONIX) 40 MG tablet Take 40 mg by mouth every morning (before breakfast)      lubiprostone (AMITIZA) 24 MCG capsule Take 24 mcg by mouth 2 times daily (with meals)      dilTIAZem (CARDIZEM) 120 MG tablet Take 120 mg by mouth 2 times daily      potassium chloride (K-TAB) 10 MEQ extended release tablet Take 10 mEq by mouth 2 times daily      Vitamin D, Ergocalciferol, 50 MCG (2000 UT) CAPS Take 1 capsule by mouth daily 90 capsule 1    dilTIAZem (TIAZAC) 120 MG extended release capsule Take 1 capsule by mouth 2 times daily 30 capsule 2    lisinopril (PRINIVIL;ZESTRIL) 5 MG tablet Take 1 tablet by mouth daily 30 tablet 2    busPIRone (BUSPAR) 10 MG tablet TAKE ONE TABLET BY MOUTH THREE TIMES A DAY 90 tablet 0    levothyroxine (SYNTHROID) 88 MCG tablet Take 1 tablet by mouth Daily 30 tablet 1    Potassium 99 MG TABS Take 1 tablet by mouth daily 90 tablet 0    HYDROcodone-acetaminophen (NORCO) 7.5-325 MG per tablet       methadone (DOLOPHINE) 10 MG tablet Take 10 mg by mouth 2 times daily.           Medications patient taking as of now reconciled against medications ordered at time of hospital discharge: Yes    Chief Complaint   Patient presents with   [facility] Follow-up     Tachycardia       HPI   Was admitted due to tachycardia and weight loss  Was drinking whiskey and 7 up 3 per day and has cut back to one a day    Inpatient course: Discharge summary reviewed- see chart. Interval history/Current status: swallowing is better, eating better, less gagging on medicien. Review of Systems   Respiratory: Negative. Cardiovascular: Negative. Gastrointestinal: Positive for constipation. Krishna Heckler last week, right leg gave out. Uses a walker. Vitals:    08/05/20 0945   BP: 110/60   Pulse: 100   Temp: 97.3 °F (36.3 °C)   SpO2: 98%   Weight: 101 lb 12.8 oz (46.2 kg)     Body mass index is 18.92 kg/m². Wt Readings from Last 3 Encounters:   08/05/20 101 lb 12.8 oz (46.2 kg)   07/25/20 97 lb 6.4 oz (44.2 kg)   11/25/19 112 lb (50.8 kg)     BP Readings from Last 3 Encounters:   08/05/20 110/60   07/25/20 110/70   11/25/19 118/70       Physical Exam  Vitals signs and nursing note reviewed. Constitutional:       General: She is not in acute distress. Appearance: She is well-developed. She is not ill-appearing. Comments: Thin   HENT:      Head: Normocephalic and atraumatic. Eyes:      General: No scleral icterus. Right eye: No discharge. Left eye: No discharge. Conjunctiva/sclera: Conjunctivae normal.      Pupils: Pupils are equal, round, and reactive to light. Neck:      Thyroid: No thyromegaly. Trachea: No tracheal deviation. Cardiovascular:      Rate and Rhythm: Normal rate and regular rhythm. Heart sounds: Normal heart sounds. Comments: No carotid bruits  Pulmonary:      Effort: Pulmonary effort is normal. No respiratory distress. Breath sounds: Normal breath sounds. No wheezing. Musculoskeletal:      Right lower leg: No edema. Left lower leg: No edema.    Lymphadenopathy: Cervical: No cervical adenopathy. Skin:     General: Skin is warm. Findings: No rash. Neurological:      Mental Status: She is alert and oriented to person, place, and time. Psychiatric:         Mood and Affect: Mood normal.         Behavior: Behavior normal.         Thought Content: Thought content normal.         Assessment/Plan:    Cut back lisinopril dose due to normal BP  See cardiology about tachycardia    1. Moderate malnutrition ([address]    2. Tachycardia    - HI DISCHARGE MEDS RECONCILED W/ CURRENT OUTPATIENT MED LIST    3.  Weight loss, abnormal    - HI DISCHARGE MEDS RECONCILED W/ CURRENT OUTPATIENT MED LIST        Medical Decision Making: moderate complexity

## 2020-08-21 RX ORDER — BUSPIRONE HYDROCHLORIDE 10 MG/1
TABLET ORAL
Qty: 90 TABLET | Refills: 3 | Status: SHIPPED | OUTPATIENT
Start: 2020-08-21 | End: 2020-09-18 | Stop reason: SDUPTHER

## 2020-09-17 ENCOUNTER — TELEPHONE (OUTPATIENT)
Dept: PRIMARY CARE CLINIC | Age: 60
End: 2020-09-17

## 2020-09-17 PROBLEM — F10.20 ALCOHOLIC (HCC): Chronic | Status: ACTIVE | Noted: 2020-09-17

## 2020-09-17 NOTE — TELEPHONE ENCOUNTER
Called back Bruce Salazar : she is concerned about her alcohol use. She did admit alcohol use last office visit  She is a lifetime alcoholic x 55+ years. Getting worse. Was drinking beer and now drinking whiskey and soda  Can't walk well, falling more. Not eating. She has been out of it. Can't stay awake during the day. Her significant other is worried about her and grand kids can't keep her awake. Consider admitting her. Being seen tomorrow. Comprehensive pain management is her PM doctor.

## 2020-09-17 NOTE — TELEPHONE ENCOUNTER
[facility] received a call from: Jung Mckinney on the behalf of patient     Name of Caller:  Tay bobby pt's daughter [facility]    Relationship to patient: Daughter    Organization name: n/a    Best contact number:  [phone]    Reason for call: She is asking if Yessi Keiafrica could call her back after clinic has closed. She would like to discuss some issues regarding her mother's health. She states that this is very important that she speaks with her before she brings her in for her appointment tomorrow. Please return her call.     Thank you

## 2020-09-18 ENCOUNTER — OFFICE VISIT (OUTPATIENT)
Dept: PRIMARY CARE CLINIC | Age: 60
End: 2020-09-18
Payer: MEDICARE

## 2020-09-18 VITALS
BODY MASS INDEX: 18.77 KG/M2 | OXYGEN SATURATION: 97 % | TEMPERATURE: 97.6 F | DIASTOLIC BLOOD PRESSURE: 58 MMHG | WEIGHT: 101 LBS | SYSTOLIC BLOOD PRESSURE: 80 MMHG | HEART RATE: 82 BPM

## 2020-09-18 PROCEDURE — G8427 DOCREV CUR MEDS BY ELIG CLIN: HCPCS | Performed by: FAMILY MEDICINE

## 2020-09-18 PROCEDURE — G8420 CALC BMI NORM PARAMETERS: HCPCS | Performed by: FAMILY MEDICINE

## 2020-09-18 PROCEDURE — G0008 ADMIN INFLUENZA VIRUS VAC: HCPCS | Performed by: FAMILY MEDICINE

## 2020-09-18 PROCEDURE — 3017F COLORECTAL CA SCREEN DOC REV: CPT | Performed by: FAMILY MEDICINE

## 2020-09-18 PROCEDURE — 99214 OFFICE O/P EST MOD 30 MIN: CPT | Performed by: FAMILY MEDICINE

## 2020-09-18 PROCEDURE — 1036F TOBACCO NON-USER: CPT | Performed by: FAMILY MEDICINE

## 2020-09-18 PROCEDURE — 90686 IIV4 VACC NO PRSV 0.5 ML IM: CPT | Performed by: FAMILY MEDICINE

## 2020-09-18 RX ORDER — BUSPIRONE HYDROCHLORIDE 10 MG/1
30 TABLET ORAL NIGHTLY
Qty: 90 TABLET | Refills: 0
Start: 2020-09-18 | End: 2020-10-28 | Stop reason: SDUPTHER

## 2020-09-18 RX ORDER — PANTOPRAZOLE SODIUM 40 MG/1
40 TABLET, DELAYED RELEASE ORAL
Qty: 30 TABLET | Refills: 2 | Status: SHIPPED | OUTPATIENT
Start: 2020-09-18 | End: 2020-12-21

## 2020-09-18 RX ORDER — DILTIAZEM HYDROCHLORIDE 120 MG/1
120 TABLET, FILM COATED ORAL DAILY
Qty: 30 TABLET | Refills: 1
Start: 2020-09-18 | End: 2020-10-28 | Stop reason: SDUPTHER

## 2020-09-18 ASSESSMENT — ENCOUNTER SYMPTOMS
SHORTNESS OF BREATH: 0
CONSTIPATION: 1
VOMITING: 1

## 2020-09-18 NOTE — PROGRESS NOTES
Eloisa Brooks a 62 y.o. female who presents today for her medical conditions/complaints as notedbelow. Chief Complaint   Patient presents with    Tachycardia    Weight Loss     Discuss weight    Constipation     constant         HPI:   HPI    Fills up fast.   Falling a lot, legs are weak  No strength. Decreased apettite, sleeping a lot  Weakness. LDL Calculated (mg/dL)   Date Value   2020 75   10/19/2018 71       (goal LDL is <100)   BUN (mg/dL)   Date Value   10/23/2014 12     BP Readings from Last 3 Encounters:   20 (!) 80/58   20 110/60   20 110/70          (goal 120/80)    Past Medical History:   Diagnosis Date    Arthritis     Cancer ([facility] [address]     right breast    GERD (gastroesophageal reflux disease)     Hypertension     Lumbar neuritis 2016    Nausea & vomiting     Post laminectomy syndrome 2016    Stress incontinence     Thyroid disease       Past Surgical History:   Procedure Laterality Date    BACK SURGERY      x 2    BREAST SURGERY Right     mastectomy with reconstruction    CERVICAL LAMINECTOMY  2014    cervicle laminectomy c4-c6    HERNIA REPAIR Bilateral     inguinal    HYSTERECTOMY, TOTAL ABDOMINAL      MASTECTOMY, RADICAL         No family history on file. Social History     Tobacco Use    Smoking status: Former Smoker     Packs/day: 0.50     Years: 30.00     Pack years: 15.00     Last attempt to quit:      Years since quittin.7    Smokeless tobacco: Never Used   Substance Use Topics    Alcohol use:  Yes     Alcohol/week: 0.0 standard drinks     Comment: 3 whiskey drinks per day      Current Outpatient Medications   Medication Sig Dispense Refill    dilTIAZem (CARDIZEM) 120 MG tablet Take 1 tablet by mouth daily 30 tablet 1    busPIRone (BUSPAR) 10 MG tablet Take 3 tablets by mouth nightly 90 tablet 0    pantoprazole (PROTONIX) 40 MG tablet Take 1 tablet by mouth every morning (before breakfast) 30 tablet 2    (45.8 kg)   SpO2 97%   BMI 18.77 kg/m²   Physical Exam  Vitals signs and nursing note reviewed. Constitutional:       General: She is not in acute distress. Appearance: She is well-developed. She is ill-appearing. Comments: Very thin female   HENT:      Head: Normocephalic and atraumatic. Right Ear: Tympanic membrane, ear canal and external ear normal.      Left Ear: Tympanic membrane, ear canal and external ear normal.   Eyes:      General: No scleral icterus. Right eye: No discharge. Left eye: No discharge. Conjunctiva/sclera: Conjunctivae normal.      Pupils: Pupils are equal, round, and reactive to light. Neck:      Thyroid: No thyromegaly. Trachea: No tracheal deviation. Cardiovascular:      Rate and Rhythm: Normal rate and regular rhythm. Heart sounds: Murmur present. Pulmonary:      Effort: Pulmonary effort is normal. No respiratory distress. Breath sounds: Normal breath sounds. No wheezing. Abdominal:      General: Bowel sounds are normal.      Palpations: Abdomen is soft. Musculoskeletal:      Right lower leg: No edema. Left lower leg: No edema. Comments: Very weak with getting up from chair and stepping up one step to the exam table  Patient using walker. Slow unsteady gait   Lymphadenopathy:      Cervical: No cervical adenopathy. Skin:     General: Skin is warm. Findings: No rash. Neurological:      Mental Status: She is alert and oriented to person, place, and time. Psychiatric:         Mood and Affect: Mood normal.         Behavior: Behavior normal.         Thought Content: Thought content normal.         Assessment:       Diagnosis Orders   1. Tachycardia     2. Hypotension due to drugs     3. Immunization due  INFLUENZA, QUADV, 3 YRS AND OLDER, IM PF, PREFILL SYR OR SDV, 0.5ML (AFLURIA QUADV, PF)   4. Alcoholic ([address]     5. Moderate malnutrition ([address]          Plan:      No follow-ups on file.   She should probably

## 2020-09-18 NOTE — PATIENT INSTRUCTIONS
Patient Education        Learning About Alcohol Use Disorder  What is alcohol use disorder? Alcohol use disorder means that a person drinks alcohol even though it causes harm to themselves or others. It can range from mild to severe. The more signs of this disorder you have, the more severe it may be. Moderate to severe alcohol use disorder is sometimes called addiction. People who have it may find it hard to control their use of alcohol. People who have this disorder may argue with others about how much they're drinking. Their job may be affected because of drinking. They may drink when it's dangerous or illegal, such as when they drive. They also may have a strong need, or craving, to drink. They may feel like they must drink just to get by. Their drinking may increase their risk of getting hurt or being in a car crash. Over time, drinking too much alcohol may cause health problems. These may include high blood pressure, liver problems, or problems with digestion. What are the signs? Maybe you've wondered about your alcohol habits, or how to tell if your drinking is becoming a problem. Here are some of the signs of alcohol use disorder. You may have it if you have two or more of the following signs:  · You drink larger amounts of alcohol than you ever meant to. Or you've been drinking for a longer time than you ever meant to. · You can't cut down or control your use. Or you constantly wish you could cut down. · You spend a lot of time getting or drinking alcohol or recovering from its effects. · You have strong cravings for alcohol. · You can no longer do your main jobs at work, at school, or at home. · You keep drinking alcohol, even though your use hurts your relationships. · You have stopped doing important activities because of your alcohol use. · You drink alcohol in situations where doing so is dangerous. · You keep drinking alcohol even though you know it's causing health problems.   · You need more and more alcohol to get the same effect, or you get less effect from the same amount over time. This is called tolerance. · You have uncomfortable symptoms when you stop drinking alcohol or use less. This is called withdrawal.  Alcohol use disorder can range from mild to severe. The more signs you have, the more severe the disorder may be. Moderate to severe alcohol use disorder is sometimes called addiction. You might not realize that your drinking is a problem. You might not drink large amounts when you drink. Or you might go for days or weeks between drinking episodes. But even if you don't drink very often, your drinking could still be harmful and put you at risk. How is alcohol use disorder treated? Getting help is up to you. But you don't have to do it alone. There are many people and kinds of treatments that can help. Treatment for alcohol use disorder can include:  · Group therapy, one or more types of counseling, and alcohol education. · Medicines that help to:  ? Reduce withdrawal symptoms and help you safely stop drinking. ? Reduce cravings for alcohol. · Support groups. These groups include Alcoholics Anonymous and [employer] (Self-Management and Recovery Training). Some people are able to stop or cut back on drinking with help from a counselor. People who have moderate to severe alcohol use disorder may need medical treatment. They may need to stay in a hospital or treatment center. You may have a treatment team to help you. This team may include a psychologist or psychiatrist, counselors, doctors, social workers, nurses, and a . A  helps plan and manage your treatment. Follow-up care is a key part of your treatment and safety. Be sure to make and go to all appointments, and call your doctor if you are having problems. It's also a good idea to know your test results and keep a list of the medicines you take. Where can you learn more?   Go to simply resting, drinking lots of fluids, and eating healthy foods. But people who drink large amounts of alcohol or are at risk for severe withdrawal symptoms should not try to detox at home unless they work closely with a doctor to manage it. A person can die of severe alcohol withdrawal.  Before you stop drinking, talk to your doctor about how you plan to stop. Be completely honest about how much you've been drinking. Your doctor will figure out if you need to detox in a medical center. You may get medicine to treat the symptoms whether you are at home or in a medical center. Medicine that treats seizures can also help. Your doctor will explain what types of medicine might help you. You may start with a high dose and then take smaller amounts over several days. There's also medicine that can help you avoid alcohol while you recover. How can you manage your withdrawal and recovery? Here are a few tips that can help you to not start drinking again. · Make sure there's no alcohol in the house. This includes drinks as well as liquid medicines, rubbing alcohol, and certain flavorings like vanilla extract. · Try not to hang out with people you used to drink with. · Don't go it alone. Spend time with people who support the changes you are making in your life. This includes asking for advice and help from people who have stopped drinking. You might also try mutual support groups such as Alcoholics Anonymous. · Drink lots of fluids. · Eat snacks such as fruit, cheese and crackers, and pretzels. High-carbohydrate foods may help reduce the craving for alcohol. What happens after withdrawal?  It can be hard to stop drinking. But after you clear the alcohol from your system, you can start the next, healthier part of your life. After detox, you will focus on staying alcohol-free. You can learn skills that you can use to stay abstinent (or sober) as you recover.  Finding new ways to deal with life's challenges, without drinking, takes time and effort. Recovery is a long-term process. It's not something you can achieve in a few weeks. Most people get some type of therapy, such as group counseling. You also may need medicine to help you stay sober. Treatment doesn't focus on alcohol use alone. It may address other parts of your life, like your relationships, work, medical problems, and home life. Treatment, support, patience, and commitment will help you make the changes you need to live a green life without alcohol. You may find, over time, that the process gets easier, life becomes more joyous, and your connections to others becomes more rewarding. Where can you find help? Behavioral Health Treatment Services . This service from the [facility] and [facility]  can help you find local alcohol treatment services. Search online at [place]. samhsa.gov or call 0-544-717-PIST ([phone]), or [place] 5-305.801.6659. Where can you learn more? Go to https://Cloudkick.Bapul. org and sign in to your [employer] account. Enter K738 in the Zoove box to learn more about \"Learning About Alcohol Withdrawal.\"     If you do not have an account, please click on the \"Sign Up Now\" link. Current as of: August 22, 2019               Content Version: 12.5  © 8958-8639 Healthwise, Incorporated. Care instructions adapted under license by [facility] ([facility]). If you have questions about a medical condition or this instruction, always ask your healthcare professional. [place] [address] any warranty or liability for your use of this information.

## 2020-09-21 ENCOUNTER — TELEPHONE (OUTPATIENT)
Dept: PRIMARY CARE CLINIC | Age: 60
End: 2020-09-21

## 2020-09-21 NOTE — TELEPHONE ENCOUNTER
[facility] calling. Stating that pt was to call about being admitted for IV Fluids today.

## 2020-09-21 NOTE — TELEPHONE ENCOUNTER
Pt told Celsa Wong that she would rather go to [facility], nothing against them, but wants [facility]. [facility] calling from Celsa Wong.  [phone]

## 2020-09-28 ENCOUNTER — TELEPHONE (OUTPATIENT)
Dept: PRIMARY CARE CLINIC | Age: 60
End: 2020-09-28

## 2020-09-28 NOTE — TELEPHONE ENCOUNTER
Pt states that she is currently in the nursing home and would like to go home.  Asking if you would call her? [phone]

## 2020-09-28 NOTE — TELEPHONE ENCOUNTER
Discussed with patient. She is feeling stronger, eating well, having BM. She has not needed the norco and only it taking methadone for pain, she is sleeping better. She needs to discuss discharge with the doctor at that facility.

## 2020-09-29 ENCOUNTER — TELEPHONE (OUTPATIENT)
Dept: PRIMARY CARE CLINIC | Age: 60
End: 2020-09-29

## 2020-09-29 NOTE — TELEPHONE ENCOUNTER
Called Mary back. Her mom got herself d/c from rehab and is home. Jaylon Christine is upset about her mom deciding to sign out and that she will go back to drinking and not eating. If she does go back to drinking the buspar needs to be removed from the house.

## 2020-09-29 NOTE — TELEPHONE ENCOUNTER
Pt daughter calling and states that her mother told her that Dr. Yossi Alexander told her that she could go home from [facility]. She is asking if this is true. Please advise.

## 2020-09-29 NOTE — TELEPHONE ENCOUNTER
No, I told her she had to talk to the doctor there and would need to be strong enough to go home. I do not have the authority to d/c her from that SNF. I had told her previously that I thought she would need 7-10 days inpatient therapy.    OV for [facility]

## 2020-09-29 NOTE — TELEPHONE ENCOUNTER
Pt daughter calling and states that her mother has tried to tell the doctors there that she was ready to go home and rejected Ruslanaimee Jha and is only worried about pain management. She states that she is tired of watching the same thing happen every time. She is asking if you could call her back when you have time.

## 2020-10-01 RX ORDER — LISINOPRIL 2.5 MG/1
TABLET ORAL
Qty: 30 TABLET | Refills: 3 | OUTPATIENT
Start: 2020-10-01

## 2020-10-12 RX ORDER — LEVOTHYROXINE SODIUM 88 UG/1
88 TABLET ORAL DAILY
Qty: 30 TABLET | Refills: 1 | Status: SHIPPED | OUTPATIENT
Start: 2020-10-12 | End: 2020-12-21 | Stop reason: SDUPTHER

## 2020-10-28 ENCOUNTER — OFFICE VISIT (OUTPATIENT)
Dept: PRIMARY CARE CLINIC | Age: 60
End: 2020-10-28
Payer: MEDICARE

## 2020-10-28 VITALS
SYSTOLIC BLOOD PRESSURE: 130 MMHG | HEIGHT: 62 IN | TEMPERATURE: 97.9 F | DIASTOLIC BLOOD PRESSURE: 74 MMHG | HEART RATE: 105 BPM | BODY MASS INDEX: 19.98 KG/M2 | WEIGHT: 108.6 LBS | OXYGEN SATURATION: 98 %

## 2020-10-28 PROCEDURE — G0009 ADMIN PNEUMOCOCCAL VACCINE: HCPCS | Performed by: FAMILY MEDICINE

## 2020-10-28 PROCEDURE — G0438 PPPS, INITIAL VISIT: HCPCS | Performed by: FAMILY MEDICINE

## 2020-10-28 PROCEDURE — 90732 PPSV23 VACC 2 YRS+ SUBQ/IM: CPT | Performed by: FAMILY MEDICINE

## 2020-10-28 PROCEDURE — G8482 FLU IMMUNIZE ORDER/ADMIN: HCPCS | Performed by: FAMILY MEDICINE

## 2020-10-28 PROCEDURE — 3017F COLORECTAL CA SCREEN DOC REV: CPT | Performed by: FAMILY MEDICINE

## 2020-10-28 RX ORDER — BUSPIRONE HYDROCHLORIDE 30 MG/1
30 TABLET ORAL NIGHTLY
Qty: 30 TABLET | Refills: 3 | Status: SHIPPED | OUTPATIENT
Start: 2020-10-28 | End: 2021-02-02

## 2020-10-28 ASSESSMENT — PATIENT HEALTH QUESTIONNAIRE - PHQ9
SUM OF ALL RESPONSES TO PHQ QUESTIONS 1-9: 0
2. FEELING DOWN, DEPRESSED OR HOPELESS: 0
SUM OF ALL RESPONSES TO PHQ QUESTIONS 1-9: 0
SUM OF ALL RESPONSES TO PHQ9 QUESTIONS 1 & 2: 0
SUM OF ALL RESPONSES TO PHQ QUESTIONS 1-9: 0
1. LITTLE INTEREST OR PLEASURE IN DOING THINGS: 0

## 2020-10-28 NOTE — PROGRESS NOTES
reflux disease)     Hypertension     Lumbar neuritis 9/8/2016    Nausea & vomiting     Post laminectomy syndrome 9/8/2016    Stress incontinence     Thyroid disease        Past Surgical History:   Procedure Laterality Date    BACK SURGERY      x 2    BREAST SURGERY Right     mastectomy with reconstruction    CERVICAL LAMINECTOMY  11/04/2014    cervicle laminectomy c4-c6    HERNIA REPAIR Bilateral     inguinal    HYSTERECTOMY, TOTAL ABDOMINAL      MASTECTOMY, RADICAL         No family history on file. CareTeam (Including outside providers/suppliers regularly involved in providing care):   Patient Care Team:  Jonny Turk MD as PCP - General (Family Medicine)  Jonny Turk MD as PCP [facility]  Jonny Turk MD (Family Medicine)  Ewelina Waller MD as Consulting Physician (Gastroenterology)  Ewelina Waller MD as Consulting Physician (Gastroenterology)    Wt Readings from Last 3 Encounters:   10/28/20 108 lb 9.6 oz (49.3 kg)   09/18/20 101 lb (45.8 kg)   08/05/20 101 lb 12.8 oz (46.2 kg)     Vitals:    10/28/20 1059   BP: 130/74   Pulse: 105   Temp: 97.9 °F (36.6 °C)   SpO2: 98%   Weight: 108 lb 9.6 oz (49.3 kg)   Height: 5' 1.5\" (1.562 m)     Body mass index is 20.19 kg/m². Based upon direct observation of the patient, evaluation of cognition reveals recent and remote memory intact. Eating better  Hasn't drank any alcohol since hospitallization    Physical Exam  Vitals signs and nursing note reviewed. Constitutional:       General: She is not in acute distress. Appearance: She is well-developed. She is not ill-appearing. HENT:      Head: Normocephalic and atraumatic. Right Ear: Tympanic membrane, ear canal and external ear normal.      Left Ear: Tympanic membrane, ear canal and external ear normal.   Eyes:      General: No scleral icterus. Right eye: No discharge. Left eye: No discharge.       Conjunctiva/sclera: Conjunctivae normal.      Pupils: Pupils are equal, round, and reactive to light. Neck:      Thyroid: No thyromegaly. Trachea: No tracheal deviation. Cardiovascular:      Rate and Rhythm: Normal rate and regular rhythm. Heart sounds: Normal heart sounds. Pulmonary:      Effort: Pulmonary effort is normal. No respiratory distress. Breath sounds: Normal breath sounds. No wheezing. Musculoskeletal:      Right lower leg: No edema. Left lower leg: No edema. Comments: Uses walker   Lymphadenopathy:      Cervical: No cervical adenopathy. Skin:     General: Skin is warm. Findings: No rash. Neurological:      Mental Status: She is alert and oriented to person, place, and time. Psychiatric:         Mood and Affect: Mood normal.         Behavior: Behavior normal.         Thought Content: Thought content normal.       Patient's complete Health Risk Assessment and screening values have been reviewed and are found in Flowsheets. The following problems were reviewed today and where indicated follow up appointments were made and/or referrals ordered. Positive Risk Factor Screenings with Interventions:     Fall Risk:  2 or more falls in past year?: (!) yes  Fall with injury in past year?: no  Fall Risk Interventions:    · Home safety tips provided   · Destiney Skaggs in [place] 2 weeks ago. Turning around after getting up from the commode and turning to the sink. ·     General Health and ACP:  General  In general, how would you say your health is?: Good  In the past 7 days, have you experienced any of the following?  New or Increased Pain, New or Increased Fatigue, Loneliness, Social Isolation, Stress or Anger?: None of These  Do you get the social and emotional support that you need?: Yes  Do you have a Living Will?: Yes  Advance Directives     Power of  Living Will ACP-Advance Directive ACP-Power of     Not on File Not on File Filed [address] Interventions:  · need living will copy    Health Habits/Nutrition:  Health Habits/Nutrition  Do you exercise for at least 20 minutes 2-3 times per week?: (!) No  Have you lost any weight without trying in the past 3 months?: No  Do you eat fewer than 2 meals per day?: No  Have you seen a dentist within the past year?: (!) No  Body mass index: 20.19  Health Habits/Nutrition Interventions:  · has dentures: no need for dentist. can't exercise    Hearing/Vision:  No exam data present  Hearing/Vision  Do you or your family notice any trouble with your hearing?: No  Do you have difficulty driving, watching TV, or doing any of your daily activities because of your eyesight?: No  Have you had an eye exam within the past year?: (!) No  Hearing/Vision Interventions:  · Vision concerns:  patient encouraged to make appointment with his/her eye specialist    ADL:  ADLs  In the past 7 days, did you need help from others to perform any of the following everyday activities? Eating, dressing, grooming, bathing, toileting, or walking/balance?: None  In the past 7 days, did you need help from others to take care of any of the following? Laundry, housekeeping, banking/finances, shopping, telephone use, food preparation, transportation, or taking medications?: (!) Transportation, Housekeeping  ADL Interventions:  · Patient declines any further evaluation/treatment for this issue  · already has help from her partner Cb Espinoza or family    Still taking buspar at [facility] and helping her sleep and her anxiety.      Personalized Preventive Plan   Current Health Maintenance Status  Immunization History   Administered Date(s) Administered    Influenza, Quadv, IM, PF (6 mo and older Fluzone, Flulaval, Fluarix, and 3 yrs and older Afluria) 10/02/2018, 11/25/2019, 09/18/2020    Tdap (Boostrix, Adacel) 07/25/2019        Health Maintenance   Topic Date Due    Pneumococcal 0-64 years Vaccine (1 of 1 - PPSV23) 07/13/1966    Shingles Vaccine (1 of 2) 07/13/2011    Annual Wellness Visit (AWV)  06/23/2019  TSH testing  07/23/2021    Potassium monitoring  07/23/2021    Creatinine monitoring  07/23/2021    Breast cancer screen  08/30/2021    Lipid screen  07/23/2025    Colon cancer screen colonoscopy  07/09/2028    DTaP/Tdap/Td vaccine (2 - Td) 07/25/2029    Flu vaccine  Completed    Hepatitis C screen  Completed    HIV screen  Completed    Hepatitis A vaccine  Aged Out    Hepatitis B vaccine  Aged Out    Hib vaccine  Aged Out    Meningococcal (ACWY) vaccine  Aged Out     Recommendations for Tapestry Due: see orders and patient instructions/AVS.  . Recommended screening schedule for the next 5-10 years is provided to the patient in written form: see Patient Brianda Alvarez was seen today for medicare awv and medication check. Diagnoses and all orders for this visit:    Routine general medical examination at a health care facility  -     Pneumococcal polysaccharide vaccine 23-valent greater than or equal to 1yo subcutaneous/IM    Immunization due  -     Pneumococcal polysaccharide vaccine 23-valent greater than or equal to 1yo subcutaneous/IM    Other orders  -     busPIRone (BUSPAR) 30 MG tablet;  Take 30 mg by mouth nightly

## 2020-10-28 NOTE — PATIENT INSTRUCTIONS
Personalized Preventive Plan for Cesar Grey - 10/28/2020  Medicare offers a range of preventive health benefits. Some of the tests and screenings are paid in full while other may be subject to a deductible, co-insurance, and/or copay. Some of these benefits include a comprehensive review of your medical history including lifestyle, illnesses that may run in your family, and various assessments and screenings as appropriate. After reviewing your medical record and screening and assessments performed today your provider may have ordered immunizations, labs, imaging, and/or referrals for you. A list of these orders (if applicable) as well as your Preventive Care list are included within your After Visit Summary for your review. Other Preventive Recommendations:    · A preventive eye exam performed by an eye specialist is recommended every 1-2 years to screen for glaucoma; cataracts, macular degeneration, and other eye disorders. · A preventive dental visit is recommended every 6 months. · Try to get at least 150 minutes of exercise per week or 10,000 steps per day on a pedometer . · Order or download the FREE \"Exercise & Physical Activity: Your Everyday Guide\" from [employer]. Call 9-108.565.5799 or search [employer] online. · You need 4968-6470 mg of calcium and 3471-8633 IU of vitamin D per day. It is possible to meet your calcium requirement with diet alone, but a vitamin D supplement is usually necessary to meet this goal.  · When exposed to the sun, use a sunscreen that protects against both UVA and UVB radiation with an SPF of 30 or greater. Reapply every 2 to 3 hours or after sweating, drying off with a towel, or swimming. · Always wear a seat belt when traveling in a car. Always wear a helmet when riding a bicycle or motorcycle.   Patient Education        Well Visit, Women 48 to 72: Care Instructions  Your Care Instructions     Physical exams can pressure results, and other factors. Mammogram. Ask your doctor how often you should have a mammogram, which is an X-ray of your breasts. A mammogram can spot breast cancer before it can be felt and when it is easiest to treat. Pap test and pelvic exam. Ask your doctor how often you should have a Pap test. You may not need to have a Pap test as often as you used to. Vision. Have your eyes checked every year or two or as often as your doctor suggests. Some experts recommend that you have yearly exams for glaucoma and other age-related eye problems starting at age 48. Hearing. Tell your doctor if you notice any change in your hearing. You can have tests to find out how well you hear. Diabetes. Ask your doctor whether you should have tests for diabetes. Colorectal cancer. Your risk for colorectal cancer gets higher as you get older. Some experts say that adults should start regular screening at age 48 and stop at age 76. Others say to start before age 48 or continue after age 76. Talk with your doctor about your risk and when to start and stop screening. Thyroid disease. Talk to your doctor about whether to have your thyroid checked as part of a regular physical exam. Women have an increased chance of a thyroid problem. Osteoporosis. You should begin tests for bone density at age 72. If you are younger than 72, ask your doctor whether you have factors that may increase your risk for this disease. You may want to have this test before age 72. Heart attack and stroke risk. At least every 4 to 6 years, you should have your risk for heart attack and stroke assessed. Your doctor uses factors such as your age, blood pressure, cholesterol, and whether you smoke or have diabetes to show what your risk for a heart attack or stroke is over the next 10 years. When should you call for help?   Watch closely for changes in your health, and be sure to contact your doctor if you have any problems or symptoms that concern you.  Where can you learn more? Go to [email or web address] [email or web address]. org and sign in to your [employer] account. Enter X967 in the [facility] box to learn more about \"Well Visit, Women 50 to 72: Care Instructions. \"     If you do not have an account, please click on the \"Sign Up Now\" link. Current as of: May 27, 2020               Content Version: 12.6  © 2006-2020 [employer]. Care instructions adapted under license by [facility] ([facility]). If you have questions about a medical condition or this instruction, always ask your healthcare professional. [place] [address] any warranty or liability for your use of this information. Patient Education        Recombinant Zoster (Shingles) Vaccine: What You Need to Know  Why get vaccinated? Recombinant zoster (shingles) vaccine can prevent shingles. Shingles (also called herpes zoster, or just zoster) is a painful skin rash, usually with blisters. In addition to the rash, shingles can cause fever, headache, chills, or upset stomach. More rarely, shingles can lead to pneumonia, hearing problems, blindness, brain inflammation (encephalitis), or death. The most common complication of shingles is long-term nerve pain called postherpetic neuralgia (PHN). PHN occurs in the areas where the shingles rash was, even after the rash clears up. It can last for months or years after the rash goes away. The pain from PHN can be severe and debilitating. About 10 to 18% of people who get shingles will experience PHN. The risk of PHN increases with age. An older adult with shingles is more likely to develop PHN and have longer lasting and more severe pain than a younger person with shingles. Shingles is caused by the varicella zoster virus, the same virus that causes chickenpox. After you have chickenpox, the virus stays in your body and can cause shingles later in life.  Shingles cannot be passed from one person to another, but the virus trials, about 1 out of 6 people who got recombinant zoster vaccine experienced side effects that prevented them from doing regular activities. Symptoms usually went away on their own in 2 to 3 days. You should still get the second dose of recombinant zoster vaccine even if you had one of these reactions after the first dose. People sometimes faint after medical procedures, including vaccination. Tell your provider if you feel dizzy or have vision changes or ringing in the ears. As with any medicine, there is a very remote chance of a vaccine causing a severe allergic reaction, other serious injury, or death. What if there is a serious problem? An allergic reaction could occur after the vaccinated person leaves the clinic. If you see signs of a severe allergic reaction (hives, swelling of the face and throat, difficulty breathing, a fast heartbeat, dizziness, or weakness), call 9-1-1 and get the person to the nearest hospital.  For other signs that concern you, call your health care provider. Adverse reactions should be reported to the Vaccine Adverse Event Reporting System (VAERS). Your health care provider will usually file this report, or you can do it yourself. Visit the VAERS website at www.vaers. hhs.gov or call 4-892.537.3997. VAERS is only for reporting reactions, and VAERS staff do not give medical advice. How can I learn more? Ask your health care provider. Call your local or state health department. Contact the Centers for Disease Control and Prevention (CDC): Call 4-918.853.9368 (1-800-CDC-INFO) or  Visit CDC's website at www.cdc.gov/vaccines  Vaccine Information Statement  Recombinant Zoster Vaccine  10/30/2019  [facility] and [facility] for Disease Control and Prevention  Many Vaccine Information Statements are available in Portuguese and other languages. See www.immunize.org/vis. Hojas de Información Sobre Vacunas están disponibles en Español y en muchos otros idiomas. Rosemary [email or web address].si   Care instructions adapted under license by [facility] ([facility]). If you have questions about a medical condition or this instruction, always ask your healthcare professional. [address] any warranty or liability for your use of this information. Patient Education        Pneumococcal Polysaccharide Vaccine: Care Instructions  Your Care Instructions     The pneumococcal polysaccharide vaccine (PPSV) can prevent some of the serious complications of pneumonia. This includes infection in the bloodstream (bacteremia) or throughout the body (septicemia). PPSV is recommended for people ages 72 years and older. People ages 3 to 59 who have a long-term illness should also get the vaccine. This includes people with diabetes, heart disease, liver disease, or lung disease. PPSV can also help people who have a weakened immune system. This includes cancer patients and people who don't have a spleen. The immune system helps your body fight infection and other illnesses. PPSV is given as a shot. It's usually given in the arm. Healthy older adults get the shot once. Other people may need to have a second dose. The shot may cause pain and redness at the site. It may also cause a mild fever for a short time. Follow-up care is a key part of your treatment and safety. Be sure to make and go to all appointments, and call your doctor if you are having problems. It's also a good idea to know your test results and keep a list of the medicines you take. How can you care for yourself at home? Take an over-the-counter pain medicine, such as acetaminophen (Tylenol), ibuprofen (Advil, Motrin), or naproxen (Aleve), if your arm is sore after the shot. Be safe with medicines. Read and follow all instructions on the label. Give acetaminophen (Tylenol) or ibuprofen (Advil, Motrin) to your child for pain or fussiness after the shot. Read and follow all instructions on the label.  Do not give aspirin to anyone younger than 20. It has been linked to Reye syndrome, a serious illness. Put ice or a cold pack on the sore area for 10 to 20 minutes at a time. Put a thin cloth between the ice and your skin. When should you call for help? Call 911 anytime you think you may need emergency care. For example, call if:    You have a seizure.     You have symptoms of a severe allergic reaction. These may include:  Sudden raised, red areas (hives) all over the body. Swelling of the throat, mouth, lips, or tongue. Trouble breathing. Passing out (losing consciousness). Or you may feel very lightheaded or suddenly feel weak, confused, or restless. Call your doctor now or seek immediate medical care if:    You have symptoms of an allergic reaction, such as:  A rash or hives (raised, red areas on the skin). Itching. Swelling. Belly pain, nausea, or vomiting.     You have a high fever. Watch closely for changes in your health, and be sure to contact your doctor if you have any problems. Where can you learn more? Go to https://[employer]. org and sign in to your [employer] account. Enter T225 in the [facility] box to learn more about \"Pneumococcal Polysaccharide Vaccine: Care Instructions. \"     If you do not have an account, please click on the \"Sign Up Now\" link. Current as of: December 9, 2019               Content Version: 12.6  © 7691-6325 [employer]. Care instructions adapted under license by [facility] ([facility]). If you have questions about a medical condition or this instruction, always ask your healthcare professional. [place] [address] any warranty or liability for your use of this information.

## 2020-12-21 RX ORDER — LEVOTHYROXINE SODIUM 88 UG/1
88 TABLET ORAL DAILY
Qty: 30 TABLET | Refills: 1 | Status: CANCELLED | OUTPATIENT
Start: 2020-12-21 | End: 2021-01-20

## 2020-12-21 NOTE — TELEPHONE ENCOUNTER
LOV 10/28/20-  Osvaldo in [facility] states that she has been out of this for 4x days now.

## 2020-12-28 NOTE — TELEPHONE ENCOUNTER
INCOMING FAX:    LOV 10/28/20-  Osvaldo in [address] [place] [address]     If needed see request scanned in media

## 2021-02-02 RX ORDER — BUSPIRONE HYDROCHLORIDE 30 MG/1
TABLET ORAL
Qty: 30 TABLET | Refills: 2 | Status: SHIPPED | OUTPATIENT
Start: 2021-02-02 | End: 2021-05-05

## 2021-03-03 ENCOUNTER — TELEPHONE (OUTPATIENT)
Dept: PRIMARY CARE CLINIC | Age: 61
End: 2021-03-03

## 2021-03-03 DIAGNOSIS — D50.9 IRON DEFICIENCY ANEMIA, UNSPECIFIED IRON DEFICIENCY ANEMIA TYPE: ICD-10-CM

## 2021-03-03 DIAGNOSIS — Z12.31 BREAST CANCER SCREENING BY MAMMOGRAM: Primary | ICD-10-CM

## 2021-03-03 NOTE — TELEPHONE ENCOUNTER
Patient stated that she is due for another mammogram and blood work. Patient would like this order sent to [facility] where she gets the blood work done at. Please give the patient a call as soon as this request can be fulfilled for her. Thank you!

## 2021-03-04 NOTE — TELEPHONE ENCOUNTER
Orders in for mammogram and CBC due to anemia. Her other yearly blood work is not due until [place] unless she wants to have it soon.

## 2021-04-08 ENCOUNTER — OFFICE VISIT (OUTPATIENT)
Dept: PRIMARY CARE CLINIC | Age: 61
End: 2021-04-08
Payer: MEDICARE

## 2021-04-08 VITALS
SYSTOLIC BLOOD PRESSURE: 130 MMHG | HEIGHT: 62 IN | HEART RATE: 76 BPM | DIASTOLIC BLOOD PRESSURE: 72 MMHG | BODY MASS INDEX: 24.25 KG/M2 | WEIGHT: 131.8 LBS | TEMPERATURE: 98.1 F | OXYGEN SATURATION: 97 %

## 2021-04-08 DIAGNOSIS — D50.9 IRON DEFICIENCY ANEMIA, UNSPECIFIED IRON DEFICIENCY ANEMIA TYPE: ICD-10-CM

## 2021-04-08 DIAGNOSIS — E03.9 HYPOTHYROIDISM, UNSPECIFIED TYPE: ICD-10-CM

## 2021-04-08 DIAGNOSIS — I10 ESSENTIAL HYPERTENSION: Primary | ICD-10-CM

## 2021-04-08 PROBLEM — F10.20 ALCOHOLIC (HCC): Chronic | Status: RESOLVED | Noted: 2020-09-17 | Resolved: 2021-04-08

## 2021-04-08 PROBLEM — F11.20 OPIOID DEPENDENCE (HCC): Status: RESOLVED | Noted: 2020-08-05 | Resolved: 2021-04-08

## 2021-04-08 PROBLEM — E44.0 MODERATE MALNUTRITION (HCC): Status: RESOLVED | Noted: 2020-08-05 | Resolved: 2021-04-08

## 2021-04-08 PROCEDURE — G8420 CALC BMI NORM PARAMETERS: HCPCS | Performed by: FAMILY MEDICINE

## 2021-04-08 PROCEDURE — G8427 DOCREV CUR MEDS BY ELIG CLIN: HCPCS | Performed by: FAMILY MEDICINE

## 2021-04-08 PROCEDURE — 1036F TOBACCO NON-USER: CPT | Performed by: FAMILY MEDICINE

## 2021-04-08 PROCEDURE — 3017F COLORECTAL CA SCREEN DOC REV: CPT | Performed by: FAMILY MEDICINE

## 2021-04-08 PROCEDURE — 99214 OFFICE O/P EST MOD 30 MIN: CPT | Performed by: FAMILY MEDICINE

## 2021-04-08 SDOH — ECONOMIC STABILITY: FOOD INSECURITY: WITHIN THE PAST 12 MONTHS, THE FOOD YOU BOUGHT JUST DIDN'T LAST AND YOU DIDN'T HAVE MONEY TO GET MORE.: NEVER TRUE

## 2021-04-08 SDOH — ECONOMIC STABILITY: TRANSPORTATION INSECURITY
IN THE PAST 12 MONTHS, HAS THE LACK OF TRANSPORTATION KEPT YOU FROM MEDICAL APPOINTMENTS OR FROM GETTING MEDICATIONS?: NO

## 2021-04-08 SDOH — ECONOMIC STABILITY: TRANSPORTATION INSECURITY
IN THE PAST 12 MONTHS, HAS LACK OF TRANSPORTATION KEPT YOU FROM MEETINGS, WORK, OR FROM GETTING THINGS NEEDED FOR DAILY LIVING?: NO

## 2021-04-08 ASSESSMENT — PATIENT HEALTH QUESTIONNAIRE - PHQ9
SUM OF ALL RESPONSES TO PHQ QUESTIONS 1-9: 0
1. LITTLE INTEREST OR PLEASURE IN DOING THINGS: 0
2. FEELING DOWN, DEPRESSED OR HOPELESS: 0

## 2021-04-08 ASSESSMENT — ENCOUNTER SYMPTOMS
GASTROINTESTINAL NEGATIVE: 1
RESPIRATORY NEGATIVE: 1
BACK PAIN: 1

## 2021-04-08 NOTE — PROGRESS NOTES
[facility] PRIMARY CARE  [address] [facility] [address]  Dept: [address] is a 62 y.o. female Established patient, who presents today for her medical conditions/complaintsas noted below. Chief Complaint   Patient presents with    Medication Check       HPI:     HPI  Reviewed prior notes None  Reviewed previous Labs  Anxiety off and on. Sleeping ok,   Eating too much junk, likes caramel, hard candy and fast food  Seeing pain management monthly for lower back pain. Says it is well-managed with Norco and methadone. Has history of two prior back surgeries. Notes numbness in legs bilaterally and has abnormal gait. She walks with knees and thighs bent Describes sensation of tightness in calf muscles. LDL Calculated (mg/dL)   Date Value   2020 75   10/19/2018 71       (goal LDL is <100)   BUN (mg/dL)   Date Value   10/23/2014 12     BP Readings from Last 3 Encounters:   21 130/72   10/28/20 130/74   20 (!) 80/58          (goal 120/80)    Past Medical History:   Diagnosis Date    Arthritis     Cancer ([address]     right breast    GERD (gastroesophageal reflux disease)     Hypertension     Lumbar neuritis 2016    Nausea & vomiting     Post laminectomy syndrome 2016    Stress incontinence     Thyroid disease       Past Surgical History:   Procedure Laterality Date    BACK SURGERY      x 2    BREAST SURGERY Right     mastectomy with reconstruction    CERVICAL LAMINECTOMY  2014    cervicle laminectomy c4-c6    HERNIA REPAIR Bilateral     inguinal    HYSTERECTOMY, TOTAL ABDOMINAL      MASTECTOMY, RADICAL         No family history on file. Social History     Tobacco Use    Smoking status: Former Smoker     Packs/day: 0.50     Years: 30.00     Pack years: 15.00     Quit date:      Years since quittin.2    Smokeless tobacco: Never Used   Substance Use Topics    Alcohol use:  Yes     Alcohol/week: 0.0 standard drinks     Comment: 3 whiskey drinks per day      Current Outpatient Medications   Medication Sig Dispense Refill    Vitamin D, Ergocalciferol, 50 MCG (2000 UT) CAPS TAKE ONE CAPSULE BY MOUTH DAILY 90 capsule 3    Potassium Gluconate 595 (99 K) MG TABS TAKE ONE TABLET BY MOUTH DAILY 90 tablet 0    busPIRone (BUSPAR) 30 MG tablet TAKE ONE TABLET BY MOUTH ONCE NIGHTLY 30 tablet 2    pantoprazole (PROTONIX) 40 MG tablet TAKE ONE TABLET BY MOUTH EVERY MORNING BEFORE BREAKFAST 30 tablet 5    dilTIAZem (TIAZAC) 120 MG extended release capsule TAKE ONE CAPSULE BY MOUTH DAILY 30 capsule 5    lubiprostone (AMITIZA) 24 MCG capsule Take 24 mcg by mouth 2 times daily (with meals)      potassium chloride (K-TAB) 10 MEQ extended release tablet Take 10 mEq by mouth 2 times daily      HYDROcodone-acetaminophen (NORCO) 7.5-325 MG per tablet       methadone (DOLOPHINE) 10 MG tablet Take 10 mg by mouth 2 times daily.  levothyroxine (SYNTHROID) 88 MCG tablet Take 1 tablet by mouth Daily 30 tablet 5     No current facility-administered medications for this visit.       Allergies   Allergen Reactions    Latex Hives, Itching, Dermatitis and Rash    Kiwi Extract      Other reaction(s): Facial Swelling    Lortab [Hydrocodone-Acetaminophen]        Health Maintenance   Topic Date Due    Shingles Vaccine (1 of 2) Never done    COVID-19 Vaccine (2 - Moderna 2-dose series) 04/27/2021    TSH testing  07/23/2021    Potassium monitoring  07/23/2021    Creatinine monitoring  07/23/2021    Breast cancer screen  08/30/2021    Annual Wellness Visit (AWV)  10/29/2021    Lipid screen  07/23/2025    Colon cancer screen colonoscopy  07/09/2028    DTaP/Tdap/Td vaccine (2 - Td) 07/25/2029    Flu vaccine  Completed    Pneumococcal 0-64 years Vaccine  Completed    Hepatitis C screen  Completed    HIV screen  Completed    Hepatitis A vaccine  Aged Out    Hepatitis B vaccine  Aged Out    Hib vaccine  Aged C/ Nathan Milo 19 Meningococcal (ACWY) vaccine  Aged Out       Subjective:      Review of Systems   Constitutional: Negative. HENT: Negative. Respiratory: Negative. Cardiovascular: Negative. Gastrointestinal: Negative. Genitourinary: Negative. Musculoskeletal: Positive for back pain, gait problem and myalgias. Neurological: Positive for numbness. Hematological: Negative. Psychiatric/Behavioral: Negative. Objective:     /72   Pulse 76   Temp 98.1 °F (36.7 °C)   Ht 5' 1.5\" (1.562 m)   Wt 131 lb 12.8 oz (59.8 kg)   SpO2 97%   BMI 24.50 kg/m²   Physical Exam  Constitutional:       Appearance: Normal appearance. She is normal weight. HENT:      Head: Normocephalic and atraumatic. Neck:      Musculoskeletal: Neck supple. Cardiovascular:      Rate and Rhythm: Normal rate and regular rhythm. Pulses: Normal pulses. Heart sounds: Normal heart sounds. Pulmonary:      Effort: Pulmonary effort is normal.      Breath sounds: Normal breath sounds. Abdominal:      General: Abdomen is flat. Palpations: Abdomen is soft. Musculoskeletal:      Right shoulder: She exhibits decreased range of motion. Comments: Forward posture . Kyphosis of the spine. Extends knees fully but tight gastrocs bilaterally. Skin:     General: Skin is warm. Neurological:      Mental Status: She is oriented to person, place, and time. Gait: Gait abnormal.   Psychiatric:         Mood and Affect: Mood normal.         Behavior: Behavior normal.         Thought Content: Thought content normal.         Judgment: Judgment normal.         Assessment:       Diagnosis Orders   1. Essential hypertension     2. Hypothyroidism, unspecified type          Plan:     Advised on stretching for calf tightness and discomfort. Sitting exercises: shoulder rolls, leg stretches, sitting straight,   also stand holding onto counter and straighten posture    No follow-ups on file.     No orders of the defined types were placed in this encounter. No orders of the defined types were placed in this encounter. Patient given educationalmaterials - see patient instructions. Discussed use, benefit, and side effectsof prescribed medications. All patient questions answered. Pt voiced understanding. Reviewed health maintenance. Instructed to continue current medications, diet andexercise. Patient agreed with treatment plan. Follow up as directed.      Pt also seen by STUART Gardiner  Electronicallysigned by Consuelo Rinne, MD on 4/8/2021 at 2:42 PM

## 2021-04-28 LAB
ANION GAP SERPL CALCULATED.3IONS-SCNC: NORMAL MMOL/L
BUN BLDV-MCNC: NORMAL MG/DL
BUN/CREAT BLD: NORMAL
CALCIUM SERPL-MCNC: NORMAL MG/DL
CHLORIDE BLD-SCNC: NORMAL MMOL/L
CO2: NORMAL
CREAT SERPL-MCNC: NORMAL MG/DL
GFR AFRICAN AMERICAN: NORMAL
GFR NON-AFRICAN AMERICAN: NORMAL
GLUCOSE FASTING: NORMAL
POTASSIUM SERPL-SCNC: NORMAL MMOL/L
SODIUM BLD-SCNC: NORMAL MMOL/L
T4 FREE: NORMAL

## 2021-05-05 RX ORDER — BUSPIRONE HYDROCHLORIDE 30 MG/1
TABLET ORAL
Qty: 30 TABLET | Refills: 2 | Status: SHIPPED | OUTPATIENT
Start: 2021-05-05 | End: 2021-07-27

## 2021-05-12 ENCOUNTER — TELEPHONE (OUTPATIENT)
Dept: PRIMARY CARE CLINIC | Age: 61
End: 2021-05-12

## 2021-05-19 DIAGNOSIS — I10 ESSENTIAL HYPERTENSION: ICD-10-CM

## 2021-05-19 DIAGNOSIS — E03.9 HYPOTHYROIDISM, UNSPECIFIED TYPE: ICD-10-CM

## 2021-06-04 DIAGNOSIS — I10 ESSENTIAL HYPERTENSION: ICD-10-CM

## 2021-06-07 RX ORDER — DILTIAZEM HYDROCHLORIDE 120 MG/1
CAPSULE, EXTENDED RELEASE ORAL
Qty: 30 CAPSULE | Refills: 4 | Status: SHIPPED | OUTPATIENT
Start: 2021-06-07 | End: 2021-11-01

## 2021-06-16 DIAGNOSIS — E03.9 HYPOTHYROIDISM, UNSPECIFIED TYPE: ICD-10-CM

## 2021-06-16 DIAGNOSIS — E87.6 HYPOKALEMIA: ICD-10-CM

## 2021-06-16 RX ORDER — MAGNESIUM GLUCONATE 30 MG(550)
1 TABLET ORAL DAILY
Qty: 90 TABLET | Refills: 0 | Status: SHIPPED | OUTPATIENT
Start: 2021-06-16 | End: 2021-09-16 | Stop reason: SDUPTHER

## 2021-06-16 RX ORDER — PANTOPRAZOLE SODIUM 40 MG/1
40 TABLET, DELAYED RELEASE ORAL DAILY
Qty: 90 TABLET | Refills: 0 | Status: SHIPPED | OUTPATIENT
Start: 2021-06-16 | End: 2021-09-16 | Stop reason: SDUPTHER

## 2021-06-16 RX ORDER — LEVOTHYROXINE SODIUM 88 UG/1
88 TABLET ORAL DAILY
Qty: 90 TABLET | Refills: 0 | Status: SHIPPED | OUTPATIENT
Start: 2021-06-16 | End: 2021-09-16 | Stop reason: SDUPTHER

## 2021-07-27 RX ORDER — BUSPIRONE HYDROCHLORIDE 30 MG/1
TABLET ORAL
Qty: 30 TABLET | Refills: 1 | Status: SHIPPED | OUTPATIENT
Start: 2021-07-27 | End: 2021-09-16 | Stop reason: SDUPTHER

## 2021-09-16 DIAGNOSIS — E03.9 HYPOTHYROIDISM, UNSPECIFIED TYPE: ICD-10-CM

## 2021-09-16 DIAGNOSIS — E87.6 HYPOKALEMIA: ICD-10-CM

## 2021-09-16 RX ORDER — PANTOPRAZOLE SODIUM 40 MG/1
40 TABLET, DELAYED RELEASE ORAL DAILY
Qty: 90 TABLET | Refills: 0 | Status: SHIPPED | OUTPATIENT
Start: 2021-09-16 | End: 2021-12-20

## 2021-09-16 RX ORDER — MAGNESIUM GLUCONATE 30 MG(550)
1 TABLET ORAL DAILY
Qty: 90 TABLET | Refills: 0 | Status: ON HOLD | OUTPATIENT
Start: 2021-09-16 | End: 2022-01-03

## 2021-09-16 RX ORDER — LEVOTHYROXINE SODIUM 88 UG/1
88 TABLET ORAL DAILY
Qty: 90 TABLET | Refills: 0 | Status: SHIPPED | OUTPATIENT
Start: 2021-09-16 | End: 2021-12-17 | Stop reason: SDUPTHER

## 2021-09-16 RX ORDER — BUSPIRONE HYDROCHLORIDE 30 MG/1
30 TABLET ORAL DAILY
Qty: 30 TABLET | Refills: 1 | Status: SHIPPED | OUTPATIENT
Start: 2021-09-16 | End: 2021-12-17 | Stop reason: SDUPTHER

## 2021-10-22 ENCOUNTER — OFFICE VISIT (OUTPATIENT)
Dept: PRIMARY CARE CLINIC | Age: 61
End: 2021-10-22
Payer: MEDICARE

## 2021-10-22 VITALS
OXYGEN SATURATION: 98 % | HEIGHT: 62 IN | SYSTOLIC BLOOD PRESSURE: 146 MMHG | HEART RATE: 104 BPM | DIASTOLIC BLOOD PRESSURE: 84 MMHG | WEIGHT: 137.6 LBS | BODY MASS INDEX: 25.32 KG/M2

## 2021-10-22 DIAGNOSIS — R27.0 ATAXIA: Primary | ICD-10-CM

## 2021-10-22 DIAGNOSIS — R20.2 NUMBNESS AND TINGLING OF BOTH LOWER EXTREMITIES: ICD-10-CM

## 2021-10-22 DIAGNOSIS — R20.0 NUMBNESS AND TINGLING OF BOTH LOWER EXTREMITIES: ICD-10-CM

## 2021-10-22 DIAGNOSIS — R20.0 NUMBNESS AND TINGLING OF BOTH UPPER EXTREMITIES: ICD-10-CM

## 2021-10-22 DIAGNOSIS — R20.2 NUMBNESS AND TINGLING OF BOTH UPPER EXTREMITIES: ICD-10-CM

## 2021-10-22 DIAGNOSIS — Z23 NEED FOR VACCINATION: ICD-10-CM

## 2021-10-22 LAB — VITAMIN B-12: 533 PG/ML (ref 232–1245)

## 2021-10-22 PROCEDURE — G0008 ADMIN INFLUENZA VIRUS VAC: HCPCS | Performed by: FAMILY MEDICINE

## 2021-10-22 PROCEDURE — 1036F TOBACCO NON-USER: CPT | Performed by: FAMILY MEDICINE

## 2021-10-22 PROCEDURE — G8482 FLU IMMUNIZE ORDER/ADMIN: HCPCS | Performed by: FAMILY MEDICINE

## 2021-10-22 PROCEDURE — G8419 CALC BMI OUT NRM PARAM NOF/U: HCPCS | Performed by: FAMILY MEDICINE

## 2021-10-22 PROCEDURE — G8427 DOCREV CUR MEDS BY ELIG CLIN: HCPCS | Performed by: FAMILY MEDICINE

## 2021-10-22 PROCEDURE — 99214 OFFICE O/P EST MOD 30 MIN: CPT | Performed by: FAMILY MEDICINE

## 2021-10-22 PROCEDURE — 3017F COLORECTAL CA SCREEN DOC REV: CPT | Performed by: FAMILY MEDICINE

## 2021-10-22 PROCEDURE — 90674 CCIIV4 VAC NO PRSV 0.5 ML IM: CPT | Performed by: FAMILY MEDICINE

## 2021-10-22 ASSESSMENT — ENCOUNTER SYMPTOMS: BACK PAIN: 1

## 2021-10-22 NOTE — PROGRESS NOTES
[address] [facility] PRIMARY CARE  [address]  [facility] [address]  Dept: [id number] is a 62 y.o. female Established patient, who presents today for her medical conditions/complaintsas noted below. Chief Complaint   Patient presents with    Numbness     pt c/o numbness in both her hands and her feet x2 months    Flu Vaccine       HPI:     HPI    Left arm can't lift up well,   numbness in arms and  Fingers bilaterally x 2-3 months. No feeling in the legs chronic on right, left leg x months. Neck and back issues, neck surgery. No falls. Has to use a walker she feels very unsteady with her gait. Cervical and lumbar MRI done last year. Sees PM   Reviewed prior notes None  Reviewed previous Labs    LDL Calculated (mg/dL)   Date Value   2020 75   10/19/2018 71       (goal LDL is <100)   BUN (mg/dL)   Date Value   10/23/2014 12     BP Readings from Last 3 Encounters:   10/22/21 (!) 146/84   21 130/72   10/28/20 130/74          (goal 120/80)    Past Medical History:   Diagnosis Date    Arthritis     Cancer ([facility] [address]     right breast    GERD (gastroesophageal reflux disease)     Hypertension     Lumbar neuritis 2016    Nausea & vomiting     Post laminectomy syndrome 2016    Stress incontinence     Thyroid disease       Past Surgical History:   Procedure Laterality Date    BACK SURGERY      x 2    BREAST SURGERY Right     mastectomy with reconstruction    CERVICAL LAMINECTOMY  2014    cervicle laminectomy c4-c6    HERNIA REPAIR Bilateral     inguinal    HYSTERECTOMY, TOTAL ABDOMINAL      MASTECTOMY, RADICAL         No family history on file. Social History     Tobacco Use    Smoking status: Former Smoker     Packs/day: 0.50     Years: 30.00     Pack years: 15.00     Quit date:      Years since quittin.8    Smokeless tobacco: Never Used   Substance Use Topics    Alcohol use:  Yes     Alcohol/week: 0.0 standard drinks     Comment: 3 whiskey drinks per day      Current Outpatient Medications   Medication Sig Dispense Refill    levothyroxine (SYNTHROID) 88 MCG tablet Take 1 tablet by mouth Daily 90 tablet 0    Potassium Gluconate 595 (99 K) MG TABS Take 1 tablet by mouth daily 90 tablet 0    pantoprazole (PROTONIX) 40 MG tablet Take 1 tablet by mouth daily 90 tablet 0    busPIRone (BUSPAR) 30 MG tablet Take 30 mg by mouth daily 30 tablet 1    dilTIAZem (DILACOR XR) 120 MG extended release capsule TAKE ONE CAPSULE BY MOUTH DAILY 30 capsule 4    Vitamin D, Ergocalciferol, 50 MCG (2000 UT) CAPS TAKE ONE CAPSULE BY MOUTH DAILY 90 capsule 3    potassium chloride (K-TAB) 10 MEQ extended release tablet Take 10 mEq by mouth 2 times daily      HYDROcodone-acetaminophen (NORCO) 7.5-325 MG per tablet       methadone (DOLOPHINE) 10 MG tablet Take 10 mg by mouth 2 times daily.  lubiprostone (AMITIZA) 24 MCG capsule Take 24 mcg by mouth 2 times daily (with meals) (Patient not taking: Reported on 10/22/2021)       No current facility-administered medications for this visit.      Allergies   Allergen Reactions    Latex Hives, Itching, Dermatitis and Rash    Kiwi Extract      Other reaction(s): Facial Swelling    Lortab [Hydrocodone-Acetaminophen]        Health Maintenance   Topic Date Due    Shingles Vaccine (1 of 2) Never done    Breast cancer screen  08/30/2021    Flu vaccine (1) 09/01/2021    Annual Wellness Visit (AWV)  10/29/2021    TSH testing  04/28/2022    Potassium monitoring  04/28/2022    Creatinine monitoring  04/28/2022    Diabetes screen  07/23/2023    Lipid screen  07/23/2025    Colon cancer screen colonoscopy  07/09/2028    DTaP/Tdap/Td vaccine (2 - Td or Tdap) 07/25/2029    COVID-19 Vaccine  Completed    Hepatitis C screen  Completed    HIV screen  Completed    Hepatitis A vaccine  Aged Out    Hepatitis B vaccine  Aged Out    Hib vaccine  Aged Out    Meningococcal (ACWY) vaccine Aged Out    Pneumococcal 0-64 years Vaccine  Aged Out       Subjective:      Review of Systems   Musculoskeletal: Positive for arthralgias, back pain and gait problem. Objective:     BP (!) 146/84   Pulse 104   Ht 5' 1.5\" (1.562 m)   Wt 137 lb 9.6 oz (62.4 kg)   SpO2 98%   BMI 25.58 kg/m²   Physical Exam  Vitals and nursing note reviewed. Constitutional:       General: She is not in acute distress. Appearance: Normal appearance. She is well-developed. She is not ill-appearing. HENT:      Head: Normocephalic and atraumatic. Right Ear: External ear normal.      Left Ear: External ear normal.   Eyes:      General: No scleral icterus. Right eye: No discharge. Left eye: No discharge. Conjunctiva/sclera: Conjunctivae normal.   Neck:      Thyroid: No thyromegaly. Trachea: No tracheal deviation. Cardiovascular:      Rate and Rhythm: Tachycardia present. Heart sounds: Normal heart sounds. Pulmonary:      Effort: Pulmonary effort is normal. No respiratory distress. Breath sounds: Normal breath sounds. No wheezing. Musculoskeletal:      Right lower leg: No edema. Left lower leg: No edema. Comments: Gait very abnormal. Can't get up the step to exam table without a lot of assistance  Shoulders ROM limited, + creps. Uses a walker. Lymphadenopathy:      Cervical: No cervical adenopathy. Skin:     General: Skin is warm. Findings: No rash. Neurological:      Mental Status: She is alert and oriented to person, place, and time. Deep Tendon Reflexes:      Reflex Scores:       Bicep reflexes are 0 on the right side and 0 on the left side. Brachioradialis reflexes are 0 on the right side and 0 on the left side. Patellar reflexes are 1+ on the right side and 3+ on the left side. Achilles reflexes are 0 on the right side and 0 on the left side. Comments: Neg Babinski right and left  Toes dorsiflexion strength 5/5 bilaterally. Psychiatric:         Mood and Affect: Mood normal.         Behavior: Behavior normal.         Thought Content: Thought content normal.         Assessment:       Diagnosis Orders   1. Ataxia  MRI [address], [place]   2. Numbness and tingling of both lower extremities  MRI [place], [place]   3. Numbness and tingling of both upper extremities  MRI [place], [place]   4. Need for vaccination  INFLUENZA, MDCK QUADV, 2 YRS AND OLDER, IM, PF, PREFILL SYR OR SDV, 0.5ML (FLUCELVAX QUADV, PF)        Plan:      Return in about 8 weeks (around 12/17/2021) for recheck nerve issue. See neurologist regarding the ataxic gait and increasing weakness. Not sure if she has another process going or perhaps she needs repeat MRIs and possibly another neurosurgery consult. Orders Placed This Encounter   Procedures    MRI BRAIN WO CONTRAST     Standing Status:   Future     Standing Expiration Date:   10/22/2022    INFLUENZA, MDCK QUADV, 2 YRS AND OLDER, IM, PF, PREFILL SYR OR SDV, 0.5ML (FLUCELVAX QUADV, PF)    Vitamin B12     Standing Status:   Future     Standing Expiration Date:   10/22/2022   [place], [place]     Referral Priority:   Routine     Referral Type:   Eval and Treat     Referral Reason:   Specialty Services Required     Number of Visits Requested:   1     No orders of the defined types were placed in this encounter. Patient given educationalmaterials - see patient instructions. Discussed use, benefit, and side effectsof prescribed medications. All patient questions answered. Pt voiced understanding. Reviewed health maintenance. Instructed to continue current medications, diet andexercise. Patient agreed with treatment plan. Follow up as directed.      Electronicallysigned by Frieda Sommer MD on 10/22/2021 at 10:51 AM

## 2021-11-01 DIAGNOSIS — I10 ESSENTIAL HYPERTENSION: ICD-10-CM

## 2021-11-01 RX ORDER — DILTIAZEM HYDROCHLORIDE 120 MG/1
CAPSULE, EXTENDED RELEASE ORAL
Qty: 30 CAPSULE | Refills: 4 | Status: SHIPPED | OUTPATIENT
Start: 2021-11-01 | End: 2021-12-17 | Stop reason: SDUPTHER

## 2021-11-19 ENCOUNTER — OFFICE VISIT (OUTPATIENT)
Dept: NEUROLOGY | Age: 61
End: 2021-11-19
Payer: MEDICARE

## 2021-11-19 VITALS
WEIGHT: 130 LBS | SYSTOLIC BLOOD PRESSURE: 170 MMHG | BODY MASS INDEX: 24.55 KG/M2 | HEART RATE: 86 BPM | DIASTOLIC BLOOD PRESSURE: 96 MMHG | HEIGHT: 61 IN

## 2021-11-19 DIAGNOSIS — M48.062 SPINAL STENOSIS OF LUMBAR REGION WITH NEUROGENIC CLAUDICATION: ICD-10-CM

## 2021-11-19 DIAGNOSIS — G95.9 CERVICAL MYELOPATHY (HCC): Primary | ICD-10-CM

## 2021-11-19 PROCEDURE — 1036F TOBACCO NON-USER: CPT | Performed by: PSYCHIATRY & NEUROLOGY

## 2021-11-19 PROCEDURE — G8427 DOCREV CUR MEDS BY ELIG CLIN: HCPCS | Performed by: PSYCHIATRY & NEUROLOGY

## 2021-11-19 PROCEDURE — 99204 OFFICE O/P NEW MOD 45 MIN: CPT | Performed by: PSYCHIATRY & NEUROLOGY

## 2021-11-19 PROCEDURE — G8420 CALC BMI NORM PARAMETERS: HCPCS | Performed by: PSYCHIATRY & NEUROLOGY

## 2021-11-19 PROCEDURE — 3017F COLORECTAL CA SCREEN DOC REV: CPT | Performed by: PSYCHIATRY & NEUROLOGY

## 2021-11-19 PROCEDURE — G8482 FLU IMMUNIZE ORDER/ADMIN: HCPCS | Performed by: PSYCHIATRY & NEUROLOGY

## 2021-11-19 RX ORDER — TIZANIDINE 4 MG/1
4 TABLET ORAL EVERY 12 HOURS
Status: ON HOLD | COMMUNITY
End: 2022-04-28 | Stop reason: HOSPADM

## 2021-11-19 ASSESSMENT — ENCOUNTER SYMPTOMS
RESPIRATORY NEGATIVE: 1
BACK PAIN: 1
EYES NEGATIVE: 1
ALLERGIC/IMMUNOLOGIC NEGATIVE: 1
GASTROINTESTINAL NEGATIVE: 1

## 2021-11-19 NOTE — PROGRESS NOTES
61 yo wf with gait imbalance and limb weakness. She reports she has lost feeling in her hands with arm weakness with trouble lifting arms along with trouble walking . She has history of neck surgery in November 2014 . She has had also two low surgeries prior to 2014 left with numbness of right leg with some residual right leg giveway . She has been using waker for at least 2 years. She reports that since August she can not lift left arm above head with left arm numbness with decreased left hand dexterity . There has also been numbness of right hand affecting three outer fingers along with also trouble lifting right arm not as bad as left . Over the last six months she is having trouble walking with loss of feeling in legs and private parts with weakness in legs . Her  reports that leg weakness is more profound over the last 2 months getting worse . There is urinary urgency awakening with incontinence in morning . She has chronic neck and low back pain . Neck pain is at grade 8 over 10 with radiation down left arm . Pain in low back is in mid lower back going to buttocks with radiation down legs of grade 8 over 10 . She has been followed by pain management ordering MRI studies of neck and low back through [facility] one year ago MRI cervical spine focal myelomalacia at C6 level stable compared from prior study July 2017 . Status post laminectomies C3-C6 . Diffuse degenerative changes . Small focal disc protrusion left C5-6, September 2020  . MRI lumbar spine L2-3, L3-4 , L4-5 severe lumbar stenosis . Moderate spinal stenosis L5-S, September 2020 . For pain she takes hydrocodone tid and methadone bid 10 mg po bid . Significant medications hydrocodone tid , methadone bid 10 mg po bid . Testing MRI cervical spine focal myelomalacia at C6 level stable compared from prior study July 2017 . Status post laminectomies C3-C6 . Diffuse degenerative changes . Small focal disc protrusion left C5-6, September 2020  .  MRI lumbar spine L2-3, L3-4 , L4-5 severe lumbar stenosis . Moderate spinal stenosis L5-S1  , 2020 . Cardiac 2 D echo normal LVF EF 55 % . Moderate TR , 2020      Past Medical History:   Diagnosis Date    Arthritis     Cancer [facility])     right breast    GERD (gastroesophageal reflux disease)     History of stomach ulcers     Hypertension     Hypothyroidism     Lumbar neuritis 2016    Nausea & vomiting     Post laminectomy syndrome 2016    Stress incontinence     Thyroid disease        Past Surgical History:   Procedure Laterality Date    BACK SURGERY      x 2    BREAST SURGERY Right     mastectomy with reconstruction    CERVICAL LAMINECTOMY  2014    cervicle laminectomy c4-c6    HERNIA REPAIR Bilateral     inguinal    HYSTERECTOMY, TOTAL ABDOMINAL      MASTECTOMY, RADICAL         Family History   Problem Relation Age of Onset    Hypertension Mother     Heart Disease Mother     Cancer Mother        Social History     Socioeconomic History    Marital status: Single     Spouse name: None    Number of children: None    Years of education: None    Highest education level: None   Occupational History    None   Tobacco Use    Smoking status: Former Smoker     Packs/day: 0.50     Years: 30.00     Pack years: 15.00     Quit date:      Years since quittin.8    Smokeless tobacco: Never Used   Vaping Use    Vaping Use: Every day    Substances: Nicotine   Substance and Sexual Activity    Alcohol use:  Yes     Alcohol/week: 0.0 standard drinks     Comment: 3 whiskey drinks per day    Drug use: No    Sexual activity: None   Other Topics Concern    None   Social History Narrative    None     Social Determinants of Health     Financial Resource Strain: Low Risk     Difficulty of Paying Living Expenses: Not very hard   Food Insecurity: No Food Insecurity    Worried About Running Out of Food in the Last Year: Never true    Rodriguez of Food in the Last Year: Never true Transportation Needs: No Transportation Needs    Lack of Transportation (Medical): No    Lack of Transportation (Non-Medical): No   Physical Activity:     Days of Exercise per Week: Not on file    Minutes of Exercise per Session: Not on file   Stress:     Feeling of Stress : Not on file   Social Connections:     Frequency of Communication with Friends and Family: Not on file    Frequency of Social Gatherings with Friends and Family: Not on file    Attends Anabaptism Services: Not on file    Active Member of [address] [place] [address] or Organizations: Not on file    Attends Club or Organization Meetings: Not on file    Marital Status: Not on file   Intimate Partner Violence:     Fear of Current or Ex-Partner: Not on file    Emotionally Abused: Not on file    Physically Abused: Not on file    Sexually Abused: Not on file   Housing Stability:     Unable to Pay for Housing in the Last Year: Not on file    Number of [place] in the Last Year: Not on file    Unstable Housing in the Last Year: Not on file       Current Outpatient Medications   Medication Sig Dispense Refill    tiZANidine (ZANAFLEX) 4 MG tablet Take 4 mg by mouth every 12 hours      dilTIAZem (DILACOR XR) 120 MG extended release capsule TAKE ONE CAPSULE BY MOUTH DAILY 30 capsule 4    levothyroxine (SYNTHROID) 88 MCG tablet Take 1 tablet by mouth Daily 90 tablet 0    Potassium Gluconate 595 (99 K) MG TABS Take 1 tablet by mouth daily 90 tablet 0    pantoprazole (PROTONIX) 40 MG tablet Take 1 tablet by mouth daily 90 tablet 0    busPIRone (BUSPAR) 30 MG tablet Take 30 mg by mouth daily 30 tablet 1    Vitamin D, Ergocalciferol, 50 MCG (2000 UT) CAPS TAKE ONE CAPSULE BY MOUTH DAILY 90 capsule 3    HYDROcodone-acetaminophen (NORCO) 7.5-325 MG per tablet Up to three tablets a day.  methadone (DOLOPHINE) 10 MG tablet Take 10 mg by mouth 2 times daily.       lubiprostone (AMITIZA) 24 MCG capsule Take 24 mcg by mouth 2 times daily (with meals) (Patient not taking: Reported on 10/22/2021)      potassium chloride (K-TAB) 10 MEQ extended release tablet Take 10 mEq by mouth 2 times daily (Patient not taking: Reported on 11/19/2021)       No current facility-administered medications for this visit. Allergies   Allergen Reactions    Latex Hives, Itching, Dermatitis and Rash    Kiwi Extract      Other reaction(s): Facial Swelling         Review of Systems     Vitals:    11/19/21 0936   BP: (!) 170/96   Pulse: 86     weight: 130 lb (59 kg)      Review of Systems   Constitutional: Negative. HENT: Negative. Eyes: Negative. Respiratory: Negative. Cardiovascular: Negative. Gastrointestinal: Negative. Endocrine: Negative. Genitourinary: Negative. Musculoskeletal: Positive for back pain, gait problem and neck pain. Skin: Negative. Allergic/Immunologic: Negative. Neurological: Positive for weakness and numbness. Hematological: Negative. Psychiatric/Behavioral: Negative. Neurological Examination  Constitutional .General exam well groomed   Head/Ears /Nose/Throat: external ear . Normal exam  Neck and thyroid . Normal size. No bruits  Respiratory . Breathsounds clear bilaterally  Cardiovascular: Auscultation of heart with regular rate and rhythm  Musculoskeletal. Muscle tone increased in legs                                                            Muscle strength upper extremities 4+/5 . Right leg 4/5, left leg 4-/5 t                                                                               No dysmetria or dysdiadokinesis  No tremor   Decreased fine motor bilaterally  Gait spastic  Orientation Alert and oriented x 3   Attention and concentration normal  Short term memory normal  Language process and speech normal . No aphasia   Cranial nerve 2 normal acuety and visual fields  Cranial nerve 3, 4 and 6 . Extraocular muscles are intact . Pupils are equal and reactive   Cranial nerve 5 . Intact corneal reflex.  Normal facial sensation  Cranial nerve 7 normal exam   Cranial nerve 8. Grossly intact hearing   Cranial nerve 9 and 10. Symmetric palate elevation   Cranial nerve 11 , 5 out of 5 strength   Cranial Nerve 12 midline tongue . No atrophy  Sensation . Decreased pinprick and light touch lower extremities   Deep Tendon Reflexes brisk throughout   Plantar response extensor bilaterally      ASSESSMENT/PLAN      Diagnosis Orders   1. Cervical myelopathy ([address]     2. Spinal stenosis of lumbar region with neurogenic claudication     Patient has progressive weakness of legs and arms suggestive of cervical myelopathy with also evidence of lumbar stenosis prior imaging . Patient is to be admitted to [facility] to neurology service for cervical and lumbar spinal imaging along with neurosurgical assessment .  Have disscussed case with Dr Austin Langley who has accepted her under his care

## 2021-11-20 ENCOUNTER — APPOINTMENT (OUTPATIENT)
Dept: MRI IMAGING | Age: 61
DRG: 552 | End: 2021-11-20
Attending: PSYCHIATRY & NEUROLOGY
Payer: MEDICARE

## 2021-11-20 ENCOUNTER — HOSPITAL ENCOUNTER (INPATIENT)
Age: 61
LOS: 2 days | Discharge: HOME | DRG: 552 | End: 2021-11-22
Attending: PSYCHIATRY & NEUROLOGY | Admitting: PSYCHIATRY & NEUROLOGY
Payer: MEDICARE

## 2021-11-20 PROCEDURE — 72141 MRI NECK SPINE W/O DYE: CPT

## 2021-11-20 PROCEDURE — 1200000000 HC SEMI PRIVATE

## 2021-11-20 PROCEDURE — 72148 MRI LUMBAR SPINE W/O DYE: CPT

## 2021-11-20 PROCEDURE — 72146 MRI CHEST SPINE W/O DYE: CPT

## 2021-11-20 PROCEDURE — 99222 1ST HOSP IP/OBS MODERATE 55: CPT | Performed by: PSYCHIATRY & NEUROLOGY

## 2021-11-20 PROCEDURE — 6370000000 HC RX 637 (ALT 250 FOR IP): Performed by: STUDENT IN AN ORGANIZED HEALTH CARE EDUCATION/TRAINING PROGRAM

## 2021-11-20 RX ORDER — DILTIAZEM HYDROCHLORIDE 60 MG/1
120 CAPSULE, EXTENDED RELEASE ORAL DAILY
Status: DISCONTINUED | OUTPATIENT
Start: 2021-11-20 | End: 2021-11-22 | Stop reason: HOSPADM

## 2021-11-20 RX ORDER — METHADONE HYDROCHLORIDE 10 MG/1
10 TABLET ORAL 2 TIMES DAILY
Status: DISCONTINUED | OUTPATIENT
Start: 2021-11-20 | End: 2021-11-22 | Stop reason: HOSPADM

## 2021-11-20 RX ORDER — HYDROCODONE BITARTRATE AND ACETAMINOPHEN 7.5; 325 MG/1; MG/1
3 TABLET ORAL 3 TIMES DAILY PRN
Status: CANCELLED | OUTPATIENT
Start: 2021-11-20

## 2021-11-20 RX ORDER — TIZANIDINE 4 MG/1
4 TABLET ORAL EVERY 12 HOURS
Status: DISCONTINUED | OUTPATIENT
Start: 2021-11-20 | End: 2021-11-22 | Stop reason: HOSPADM

## 2021-11-20 RX ORDER — ONDANSETRON 4 MG/1
4 TABLET, ORALLY DISINTEGRATING ORAL EVERY 8 HOURS PRN
Status: DISCONTINUED | OUTPATIENT
Start: 2021-11-20 | End: 2021-11-22 | Stop reason: HOSPADM

## 2021-11-20 RX ORDER — ACETAMINOPHEN 650 MG/1
650 SUPPOSITORY RECTAL EVERY 6 HOURS PRN
Status: DISCONTINUED | OUTPATIENT
Start: 2021-11-20 | End: 2021-11-22 | Stop reason: HOSPADM

## 2021-11-20 RX ORDER — SODIUM CHLORIDE 0.9 % (FLUSH) 0.9 %
5-40 SYRINGE (ML) INJECTION PRN
Status: DISCONTINUED | OUTPATIENT
Start: 2021-11-20 | End: 2021-11-22 | Stop reason: HOSPADM

## 2021-11-20 RX ORDER — LEVOTHYROXINE SODIUM 88 UG/1
88 TABLET ORAL DAILY
Status: DISCONTINUED | OUTPATIENT
Start: 2021-11-20 | End: 2021-11-22 | Stop reason: HOSPADM

## 2021-11-20 RX ORDER — BUSPIRONE HYDROCHLORIDE 15 MG/1
30 TABLET ORAL DAILY
Status: DISCONTINUED | OUTPATIENT
Start: 2021-11-20 | End: 2021-11-22 | Stop reason: HOSPADM

## 2021-11-20 RX ORDER — ACETAMINOPHEN 325 MG/1
650 TABLET ORAL EVERY 6 HOURS PRN
Status: DISCONTINUED | OUTPATIENT
Start: 2021-11-20 | End: 2021-11-22 | Stop reason: HOSPADM

## 2021-11-20 RX ORDER — POLYETHYLENE GLYCOL 3350 17 G/17G
17 POWDER, FOR SOLUTION ORAL DAILY PRN
Status: DISCONTINUED | OUTPATIENT
Start: 2021-11-20 | End: 2021-11-22 | Stop reason: HOSPADM

## 2021-11-20 RX ORDER — ONDANSETRON 2 MG/ML
4 INJECTION INTRAMUSCULAR; INTRAVENOUS EVERY 6 HOURS PRN
Status: DISCONTINUED | OUTPATIENT
Start: 2021-11-20 | End: 2021-11-22 | Stop reason: HOSPADM

## 2021-11-20 RX ORDER — HYDROCODONE BITARTRATE AND ACETAMINOPHEN 5; 325 MG/1; MG/1
1.5 TABLET ORAL 3 TIMES DAILY PRN
Status: DISCONTINUED | OUTPATIENT
Start: 2021-11-20 | End: 2021-11-22 | Stop reason: HOSPADM

## 2021-11-20 RX ORDER — SODIUM CHLORIDE 0.9 % (FLUSH) 0.9 %
5-40 SYRINGE (ML) INJECTION EVERY 12 HOURS SCHEDULED
Status: DISCONTINUED | OUTPATIENT
Start: 2021-11-20 | End: 2021-11-22 | Stop reason: HOSPADM

## 2021-11-20 RX ORDER — SODIUM CHLORIDE 9 MG/ML
25 INJECTION, SOLUTION INTRAVENOUS PRN
Status: DISCONTINUED | OUTPATIENT
Start: 2021-11-20 | End: 2021-11-22 | Stop reason: HOSPADM

## 2021-11-20 RX ADMIN — HYDROCODONE BITARTRATE AND ACETAMINOPHEN 1.5 TABLET: 5; 325 TABLET ORAL at 15:13

## 2021-11-20 RX ADMIN — BUSPIRONE HYDROCHLORIDE 30 MG: 15 TABLET ORAL at 21:23

## 2021-11-20 RX ADMIN — TIZANIDINE 4 MG: 4 TABLET ORAL at 21:23

## 2021-11-20 RX ADMIN — METHADONE HYDROCHLORIDE 10 MG: 10 TABLET ORAL at 21:23

## 2021-11-20 ASSESSMENT — PAIN SCALES - GENERAL
PAINLEVEL_OUTOF10: 6
PAINLEVEL_OUTOF10: 6
PAINLEVEL_OUTOF10: 8

## 2021-11-20 ASSESSMENT — ENCOUNTER SYMPTOMS
CHEST TIGHTNESS: 0
BACK PAIN: 1
NAUSEA: 0
DIARRHEA: 0
SHORTNESS OF BREATH: 0
WHEEZING: 0
VOMITING: 0
PHOTOPHOBIA: 0

## 2021-11-20 NOTE — H&P
[id number] [facility]   [address] [place] [address]    HISTORY AND PHYSICAL EXAMINATION            Date:   11/20/2021  Patient name:  Genevive Hashimoto  Date of admission:  11/20/2021 12:08 PM  MRN:   [id number]  Account:  [de-identified]  YOB: 1960  PCP:    Tabitha Hunter MD  Room:   [address] [place] [address]  Code Status:    Full Code    Chief Complaint:     No chief complaint on file. History Obtained From:     Patient    History of Present Illness: The patient is a 62 y.o. Non- / non  female who presents with No chief complaint on file. and she is admitted to the hospital for the management of lower extremity weakness and worsening arm weakness with difficulty walking. 79-year-old female with past medical history of arthritis, hypertension, postlaminectomy syndrome, hypothyroidism, back pain status post laminectomies C3-C4 was referred from neurology clinic with complaint of gait imbalance, worsening limb weakness along with arm weakness and trouble lifting. She has been using walker for the past 2 years but had back issues since 2010. She has been feeling more weakness, stiffness along with difficulty controlling her bowels and bladder for the past few months. She also noted weakness of the bilateral upper extremity more pronounced in the left arm with difficulty raising it above the shoulder and associated with numbness and tingling sensation. She also has moderate to severe back pain and follows with pain management and is currently on hydrocodone 3 times daily and methadone twice daily. On my evaluation, patient was alert and oriented x3 with no facial weakness or sensory changes. In bilateral upper extremities, motor strength 5/5 in the right upper extremity and 4+/5 in left upper extremity. Patient able to lift arm over the shoulder but started complaining of numbness, tingling and pain shooting down towards the hand.   Able to differentiate between pinprick and dull in both upper and lower extremities. Reflexes +2     In bilateral lower extremity, patient had increased tone more pronounced below the knee. Difficulty differentiating between pinprick and dull. Reflexes +2 in left patellar but +3 in the right patellar. Plan to get MRI cervical, thoracic and lumbar spine without contrast.  Physical therapy and Occupational Therapy with PM&R consult. Past Medical History:     Past Medical History:   Diagnosis Date    Arthritis     Cancer ([facility]     right breast    GERD (gastroesophageal reflux disease)     History of stomach ulcers     Hypertension     Hypothyroidism     Lumbar neuritis 9/8/2016    Nausea & vomiting     Post laminectomy syndrome 9/8/2016    Stress incontinence     Thyroid disease         Past Surgical History:     Past Surgical History:   Procedure Laterality Date    BACK SURGERY      x 2    BREAST SURGERY Right     mastectomy with reconstruction    CERVICAL LAMINECTOMY  11/04/2014    cervicle laminectomy c4-c6    HERNIA REPAIR Bilateral     inguinal    HYSTERECTOMY, TOTAL ABDOMINAL      MASTECTOMY, RADICAL          Medications Prior to Admission:     Prior to Admission medications    Medication Sig Start Date End Date Taking?  Authorizing Provider   tiZANidine (ZANAFLEX) 4 MG tablet Take 4 mg by mouth every 12 hours    Historical Provider, MD   dilTIAZem (DILACOR XR) 120 MG extended release capsule TAKE ONE CAPSULE BY MOUTH DAILY 11/1/21   Janneth Villarreal MD   levothyroxine (SYNTHROID) 88 MCG tablet Take 1 tablet by mouth Daily 9/16/21 11/19/21  Janneth Villarreal MD   Potassium Gluconate 595 (99 K) MG TABS Take 1 tablet by mouth daily 9/16/21   Janneth Villarreal MD   pantoprazole (PROTONIX) 40 MG tablet Take 1 tablet by mouth daily 9/16/21   Janneth Villarreal MD   busPIRone (BUSPAR) 30 MG tablet Take 30 mg by mouth daily 9/16/21   Janneth Villarreal MD   Vitamin D, Ergocalciferol, 50 MCG (2000 UT) CAPS TAKE ONE CAPSULE BY MOUTH DAILY 3/29/21   Antonio Randolph PA-C   lubiprostone (AMITIZA) 24 MCG capsule Take 24 mcg by mouth 2 times daily (with meals)  Patient not taking: Reported on 10/22/2021 7/29/20   Historical Provider, MD   potassium chloride (K-TAB) 10 MEQ extended release tablet Take 10 mEq by mouth 2 times daily  Patient not taking: Reported on 11/19/2021 7/27/20   Historical Provider, MD   HYDROcodone-acetaminophen (NORCO) 7.5-325 MG per tablet Up to three tablets a day. 10/21/15   Historical Provider, MD   methadone (DOLOPHINE) 10 MG tablet Take 10 mg by mouth 2 times daily. Historical Provider, MD        Allergies:     Latex and Kiwi extract    Social History:     Tobacco:    reports that she quit smoking about 7 years ago. She has a 15.00 pack-year smoking history. She has never used smokeless tobacco.  Alcohol:      reports current alcohol use. Drug Use:  reports no history of drug use. Family History:     Family History   Problem Relation Age of Onset    Hypertension Mother     Heart Disease Mother     Cancer Mother        Review of Systems:     Review of Systems   Constitutional: Positive for fatigue. HENT: Negative for tinnitus. Eyes: Negative for photophobia and visual disturbance. Respiratory: Negative for chest tightness, shortness of breath and wheezing. Gastrointestinal: Negative for diarrhea, nausea and vomiting. Genitourinary: Positive for urgency. Musculoskeletal: Positive for back pain. Negative for myalgias, neck pain and neck stiffness. Skin: Negative for wound. Neurological: Positive for weakness and numbness. Negative for dizziness, tremors, seizures, syncope, facial asymmetry, speech difficulty, light-headedness and headaches. Psychiatric/Behavioral: Negative for behavioral problems. The patient is not nervous/anxious.           Physical Exam:   /83   Pulse 89   Temp 97.8 °F (36.6 °C) (Oral)   Resp 14   Ht 5' 1\" (1.549 m)   Wt 130 lb (59 kg)   SpO2 94%   BMI 24.56 kg/m²   Temp (24hrs), Av.2 °F (36.8 °C), Min:97.8 °F (36.6 °C), Max:98.6 °F (37 °C)    No results for input(s): POCGLU in the last 72 hours. No intake or output data in the 24 hours ending 21 1620      Neurologic Exam     Mental Status   Oriented to person, place, and time. Registration: recalls 3 of 3 objects. Attention: normal. Concentration: normal.   Level of consciousness: alert  Knowledge: good. Cranial Nerves   Cranial nerves II through XII intact. Motor Exam   Muscle bulk: decreased  Overall muscle tone: increased (Increased in b/l lower extremitiy below the knee)  Right arm tone: normal  Left arm tone: normal  Right leg tone: increased  Left leg tone: increased    Strength   Right strength: Decrease range of motion in the neck especially after cervical surgery but has worsened recently  Right deltoid: 5/5  Left deltoid: 5/5  Right biceps: 5/5  Left biceps: 5/5  Right triceps: 5/5  Left triceps: 5/5  Right wrist flexion: 5/5  Left wrist flexion: 5/5  Right wrist extension: 5/5  Left wrist extension: 5/5    Gait, Coordination, and Reflexes     Reflexes   Right triceps: 2+  Left triceps: 2+  Right patellar: 3+  Left patellar: 3+  Right achilles: 2+  Left achilles: 2+      Investigations:      Laboratory Testing:  No results found for this or any previous visit (from the past 24 hour(s)). Assessment :      Primary Problem  <principal problem not specified>    Active Hospital Problems    Diagnosis Date Noted    [facility] [G95.9] 2021       Patient is a 62 y.o. Non- / non  female hx of chronic back and neck pain s/p multiple spine surgeries, worsening lower extremity weakness for 2 years and worsening b/l upper extremity weakness. Referred from clinic for MRI cervical, thoracic and lumbar without contrast.     Plan:   Patient status Admit as inpatient in the  Progressive Unit/Step down    1. MRI cervical wo contrast  2.  MRI thoracic wo contrast  3. MRI lumbar spine wo contrast  4. PT/OT. 5. PM&R consult and notified. 6. DVT prophylaxis. 7. Strict input and output. 8. Continue methadone and hydrocodone home medication. Consultations:   IP CONSULT TO PHYSICAL MEDICINE REHAB    Follow-up further recommendations after discussing the case with attending  The plan was discussed with the patient, patient's family and the medical staff. Patient is admitted as inpatient status because of co-morbidities listed above, severity of signs and symptoms as outlined, requirement for current medical therapies and most importantly because of direct risk to patient if care not provided in a hospital setting.     Jeniffer Sue MD  11/20/2021  4:20 PM    Copy sent to Dr. Aryan Morin MD

## 2021-11-20 NOTE — CARE COORDINATION
Case Management Initial Discharge Plan  Aylin Maradiaga,             Met with:patient to discuss discharge plans. Information verified: address, contacts, phone number, , insurance Yes  Insurance Provider: Medicare    Emergency Contact/Next of Kin name & number: trung Edge, [phone], dtr Jill Morse [phone] [place]  Who are involved in patient's support system? Luis Stark    PCP: Eva Thompson MD  Date of last visit: 2 weeks ago      Discharge Planning    Living Arrangements:  Spouse/Significant Other     Home has 1 stories  1 stairs to climb to get into front door, 0stairs to climb to reach second floor  Location of bedroom/bathroom in home main level    Patient able to perform ADL's:Independent    Current Services (outpatient & in home)    DME equipment: Rollator and RW  DME provider:      Is patient receiving oral anticoagulation therapy? No    If indicated:   Physician managing anticoagulation treatment:    Where does patient obtain lab work for ATC treatment? Potential Assistance Needed:       Patient agreeable to home care: No  [place] of choice provided:  n/a    Prior SNF/Rehab Placement and Facility:    Agreeable to SNF/Rehab: No  [place] of choice provided: n/a     Evaluation: no    Expected Discharge date:       Patient expects to be discharged to: If home: is the family and/or caregiver wiling & able to provide support at home? yes  Who will be providing this support? Emmett    Follow Up Appointment: Best Day/ Time:      Transportation provider: has a ride  Transportation arrangements needed for discharge: No     Readmission Risk              Risk of Unplanned Readmission:  6             Does patient have a readmission risk score greater than 14?: No  If yes, follow-up appointment must be made within 7 days of discharge.      Goals of Care:       Educated patient and her partner on transitional options, provided freedom of choice and are agreeable with plan      Discharge Plan: home with Ignacio Stockton, has a ride          Electronically signed by Hudson Mena RN on 11/20/21 at 12:37 PM EST

## 2021-11-21 LAB
ABSOLUTE EOS #: 0.07 K/UL (ref 0–0.44)
ABSOLUTE IMMATURE GRANULOCYTE: <0.03 K/UL (ref 0–0.3)
ABSOLUTE LYMPH #: 1.74 K/UL (ref 1.1–3.7)
ABSOLUTE MONO #: 0.45 K/UL (ref 0.1–1.2)
ANION GAP SERPL CALCULATED.3IONS-SCNC: 8 MMOL/L (ref 9–17)
BASOPHILS # BLD: 1 % (ref 0–2)
BASOPHILS ABSOLUTE: 0.04 K/UL (ref 0–0.2)
BUN BLDV-MCNC: 13 MG/DL (ref 8–23)
BUN/CREAT BLD: ABNORMAL (ref 9–20)
CALCIUM SERPL-MCNC: 9.2 MG/DL (ref 8.6–10.4)
CHLORIDE BLD-SCNC: 99 MMOL/L (ref 98–107)
CO2: 29 MMOL/L (ref 20–31)
CREAT SERPL-MCNC: 0.99 MG/DL (ref 0.5–0.9)
DIFFERENTIAL TYPE: ABNORMAL
EOSINOPHILS RELATIVE PERCENT: 2 % (ref 1–4)
GFR AFRICAN AMERICAN: >60 ML/MIN
GFR NON-AFRICAN AMERICAN: 57 ML/MIN
GFR SERPL CREATININE-BSD FRML MDRD: ABNORMAL ML/MIN/{1.73_M2}
GFR SERPL CREATININE-BSD FRML MDRD: ABNORMAL ML/MIN/{1.73_M2}
GLUCOSE BLD-MCNC: 89 MG/DL (ref 70–99)
HCT VFR BLD CALC: 38.7 % (ref 36.3–47.1)
HEMOGLOBIN: 12.3 G/DL (ref 11.9–15.1)
IMMATURE GRANULOCYTES: 0 %
LYMPHOCYTES # BLD: 38 % (ref 24–43)
MCH RBC QN AUTO: 31.6 PG (ref 25.2–33.5)
MCHC RBC AUTO-ENTMCNC: 31.8 G/DL (ref 28.4–34.8)
MCV RBC AUTO: 99.5 FL (ref 82.6–102.9)
MONOCYTES # BLD: 10 % (ref 3–12)
NRBC AUTOMATED: 0 PER 100 WBC
PDW BLD-RTO: 12.5 % (ref 11.8–14.4)
PLATELET # BLD: 244 K/UL (ref 138–453)
PLATELET ESTIMATE: ABNORMAL
PMV BLD AUTO: 9.4 FL (ref 8.1–13.5)
POTASSIUM SERPL-SCNC: 4.3 MMOL/L (ref 3.7–5.3)
RBC # BLD: 3.89 M/UL (ref 3.95–5.11)
RBC # BLD: ABNORMAL 10*6/UL
SEG NEUTROPHILS: 49 % (ref 36–65)
SEGMENTED NEUTROPHILS ABSOLUTE COUNT: 2.29 K/UL (ref 1.5–8.1)
SODIUM BLD-SCNC: 136 MMOL/L (ref 135–144)
WBC # BLD: 4.6 K/UL (ref 3.5–11.3)
WBC # BLD: ABNORMAL 10*3/UL

## 2021-11-21 PROCEDURE — 94664 DEMO&/EVAL PT USE INHALER: CPT

## 2021-11-21 PROCEDURE — 80048 BASIC METABOLIC PNL TOTAL CA: CPT

## 2021-11-21 PROCEDURE — 1200000000 HC SEMI PRIVATE

## 2021-11-21 PROCEDURE — 85025 COMPLETE CBC W/AUTO DIFF WBC: CPT

## 2021-11-21 PROCEDURE — 99232 SBSQ HOSP IP/OBS MODERATE 35: CPT | Performed by: PSYCHIATRY & NEUROLOGY

## 2021-11-21 PROCEDURE — 99223 1ST HOSP IP/OBS HIGH 75: CPT | Performed by: NEUROLOGICAL SURGERY

## 2021-11-21 PROCEDURE — 36415 COLL VENOUS BLD VENIPUNCTURE: CPT

## 2021-11-21 PROCEDURE — 94761 N-INVAS EAR/PLS OXIMETRY MLT: CPT

## 2021-11-21 PROCEDURE — 6370000000 HC RX 637 (ALT 250 FOR IP): Performed by: STUDENT IN AN ORGANIZED HEALTH CARE EDUCATION/TRAINING PROGRAM

## 2021-11-21 RX ORDER — PANTOPRAZOLE SODIUM 40 MG/1
40 TABLET, DELAYED RELEASE ORAL DAILY
Status: DISCONTINUED | OUTPATIENT
Start: 2021-11-21 | End: 2021-11-22 | Stop reason: HOSPADM

## 2021-11-21 RX ADMIN — BUSPIRONE HYDROCHLORIDE 30 MG: 15 TABLET ORAL at 23:06

## 2021-11-21 RX ADMIN — DILTIAZEM HYDROCHLORIDE 120 MG: 60 CAPSULE, EXTENDED RELEASE ORAL at 08:39

## 2021-11-21 RX ADMIN — PANTOPRAZOLE SODIUM 40 MG: 40 TABLET, DELAYED RELEASE ORAL at 09:16

## 2021-11-21 RX ADMIN — LEVOTHYROXINE SODIUM 88 MCG: 88 TABLET ORAL at 08:39

## 2021-11-21 RX ADMIN — HYDROCODONE BITARTRATE AND ACETAMINOPHEN 1.5 TABLET: 5; 325 TABLET ORAL at 18:34

## 2021-11-21 RX ADMIN — HYDROCODONE BITARTRATE AND ACETAMINOPHEN 1.5 TABLET: 5; 325 TABLET ORAL at 08:37

## 2021-11-21 RX ADMIN — METHADONE HYDROCHLORIDE 10 MG: 10 TABLET ORAL at 23:06

## 2021-11-21 RX ADMIN — METHADONE HYDROCHLORIDE 10 MG: 10 TABLET ORAL at 08:37

## 2021-11-21 RX ADMIN — HYDROCODONE BITARTRATE AND ACETAMINOPHEN 1.5 TABLET: 5; 325 TABLET ORAL at 00:33

## 2021-11-21 RX ADMIN — TIZANIDINE 4 MG: 4 TABLET ORAL at 23:06

## 2021-11-21 ASSESSMENT — ENCOUNTER SYMPTOMS
ABDOMINAL DISTENTION: 0
COUGH: 0
SHORTNESS OF BREATH: 0
VOMITING: 0
DIARRHEA: 0
CHEST TIGHTNESS: 0
WHEEZING: 0
ABDOMINAL PAIN: 0
NAUSEA: 0

## 2021-11-21 ASSESSMENT — PAIN SCALES - GENERAL
PAINLEVEL_OUTOF10: 6
PAINLEVEL_OUTOF10: 8
PAINLEVEL_OUTOF10: 9
PAINLEVEL_OUTOF10: 6
PAINLEVEL_OUTOF10: 3

## 2021-11-21 NOTE — PROGRESS NOTES
[facility]   [facility]    Progress Note             Date:   11/21/2021  Patient name:  Trevon Canales  Date of admission:  11/20/2021 12:08 PM  MRN:   [id number]  Account:  [de-identified]  YOB: 1960  PCP:    No primary care provider on file. Room:   [address] [place] [address]  Code Status:    Full Code    Chief Complaint:     No chief complaint on file. Interval hx: The patient was seen and examined at bedside. Is vitally stable, alert and oriented x 3. No acute events overnight. The patient stated that her weakness is unchanged from yesterday. She is requesting food. Brief History of Present Illness: The patient is a 62 y.o. Non- / non  female who presents with No chief complaint on file. and she is admitted to the hospital for the management of lower extremity weakness and worsening arm weakness with difficulty walking. 58-year-old female with past medical history of arthritis, hypertension, postlaminectomy syndrome, hypothyroidism, back pain status post laminectomies C3-C4 was referred from neurology clinic with complaint of gait imbalance, worsening limb weakness along with arm weakness and trouble lifting. She has been using walker for the past 2 years but had back issues since 2010. She has been feeling more weakness, stiffness along with difficulty controlling her bowels and bladder for the past few months. She also noted weakness of the bilateral upper extremity more pronounced in the left arm with difficulty raising it above the shoulder and associated with numbness and tingling sensation. She also has moderate to severe back pain and follows with pain management and is currently on hydrocodone 3 times daily and methadone twice daily. On my evaluation, patient was alert and oriented x3 with no facial weakness or sensory changes.   In bilateral upper extremities, motor strength 5/5 in the right upper extremity and 4+/5 in left upper extremity. Patient able to lift arm over the shoulder but started complaining of numbness, tingling and pain shooting down towards the hand. Able to differentiate between pinprick and dull in both upper and lower extremities. Reflexes +2      In bilateral lower extremity, patient had increased tone more pronounced below the knee. Difficulty differentiating between pinprick and dull. Reflexes +2 in left patellar but +3 in the right patellar. Past Medical History:     Past Medical History:   Diagnosis Date    Arthritis     Cancer ([address]     right breast    GERD (gastroesophageal reflux disease)     History of stomach ulcers     Hypertension     Hypothyroidism     Lumbar neuritis 9/8/2016    Nausea & vomiting     Post laminectomy syndrome 9/8/2016    Stress incontinence     Thyroid disease         Past Surgical History:     Past Surgical History:   Procedure Laterality Date    BACK SURGERY      x 2    BREAST SURGERY Right     mastectomy with reconstruction    CERVICAL LAMINECTOMY  11/04/2014    cervicle laminectomy c4-c6    HERNIA REPAIR Bilateral     inguinal    HYSTERECTOMY, TOTAL ABDOMINAL      MASTECTOMY, RADICAL          Medications Prior to Admission:     Prior to Admission medications    Medication Sig Start Date End Date Taking?  Authorizing Provider   tiZANidine (ZANAFLEX) 4 MG tablet Take 4 mg by mouth every 12 hours    Historical Provider, MD   dilTIAZem (DILACOR XR) 120 MG extended release capsule TAKE ONE CAPSULE BY MOUTH DAILY 11/1/21   Irma Marcos MD   levothyroxine (SYNTHROID) 88 MCG tablet Take 1 tablet by mouth Daily 9/16/21 11/19/21  Irma Marcos MD   Potassium Gluconate 595 (99 K) MG TABS Take 1 tablet by mouth daily 9/16/21   Irma Marcos MD   pantoprazole (PROTONIX) 40 MG tablet Take 1 tablet by mouth daily 9/16/21   Irma Marcos MD   busPIRone (BUSPAR) 30 MG tablet Take 30 mg by mouth daily 9/16/21   Irma Marcos MD   Vitamin D, Ergocalciferol, 50 MCG ( UT) CAPS TAKE ONE CAPSULE BY MOUTH DAILY 3/29/21   Divina Burrell PA-C   HYDROcodone-acetaminophen [facility]) 7.5-325 MG per tablet Up to three tablets a day. 10/21/15   Historical Provider, MD   methadone (DOLOPHINE) 10 MG tablet Take 10 mg by mouth 2 times daily. Historical Provider, MD        Allergies:     Latex and Kiwi extract    Social History:     Tobacco:    reports that she quit smoking about 7 years ago. She has a 15.00 pack-year smoking history. She has never used smokeless tobacco.  Alcohol:      reports current alcohol use. Drug Use:  reports no history of drug use. Family History:     Family History   Problem Relation Age of Onset    Hypertension Mother     Heart Disease Mother     Cancer Mother        Review of Systems:     Review of Systems   Constitutional: Negative for activity change, appetite change, chills and fever. HENT: Negative for congestion. Respiratory: Negative for cough, chest tightness, shortness of breath and wheezing. Cardiovascular: Negative for chest pain and leg swelling. Gastrointestinal: Negative for abdominal distention, abdominal pain, diarrhea, nausea and vomiting. Genitourinary: Negative for dysuria and flank pain. Skin: Negative for rash. Neurological: Negative for dizziness, weakness, numbness and headaches. Psychiatric/Behavioral: Negative for agitation, behavioral problems, confusion and sleep disturbance. Physical Exam:   BP (!) 147/86   Pulse 78   Temp 98.9 °F (37.2 °C) (Axillary)   Resp 16   Ht 5' 1\" (1.549 m)   Wt 130 lb (59 kg)   SpO2 96%   BMI 24.56 kg/m²   Temp (24hrs), Av.3 °F (36.8 °C), Min:97.8 °F (36.6 °C), Max:98.9 °F (37.2 °C)    No results for input(s): POCGLU in the last 72 hours.     Intake/Output Summary (Last 24 hours) at 2021 1505  Last data filed at 2021 1630  Gross per 24 hour   Intake 360 ml   Output 400 ml   Net -40 ml         Neurologic Exam     GENERAL Appears comfortable and in no distress   HEENT  NC/ AT   HEART  S1 and S2 heard; palpation of pulses: radial pulse    NECK  Supple and no bruits heard   MENTAL STATUS:  Alert, oriented, intact memory, no confusion, normal speech, normal language, no hallucination or delusion   CRANIAL NERVES: II     -      Visual fields intact to confrontation  III,IV,VI -  PERR, EOMs full, no ptosis  V     -     Normal facial sensation   VII    -     Normal facial symmetry  VIII   -     Intact hearing   IX,X -     Symmetrical palate  XI    -     Symmetrical shoulder shrug  XII   -     Midline tongue, no atrophy    MOTOR FUNCTION: RUE: Significant for good strength of grade 4/5 in proximal and distal muscle groups   LUE: Significant for good strength of grade 5/5 in proximal and distal muscle groups   RLE: Significant for good strength of grade 5/5 in proximal and distal muscle groups   LLE: Significant for good strength of grade 5/5 in proximal and distal muscle groups      Normal bulk, normal tone and no involuntary movements, no tremor   SENSORY FUNCTION:  Normal touch, normal pinprick, normal vibration, normal proprioception   CEREBELLAR FUNCTION:  Intact fine motor control over upper limbs and lower limbs   REFLEX FUNCTION:  Symmetric in upper and lower extremities, no Babinski sign   STATION and GAIT  unsafe to ambulate patient at this time, awaiting PT evaluation       Investigations:      Laboratory Testing:  Recent Results (from the past 24 hour(s))   Basic Metabolic Panel w/ Reflex to MG    Collection Time: 11/21/21  5:48 AM   Result Value Ref Range    Glucose 89 70 - 99 mg/dL    BUN 13 8 - 23 mg/dL    CREATININE 0.99 (H) 0.50 - 0.90 mg/dL    Bun/Cre Ratio NOT REPORTED 9 - 20    Calcium 9.2 8.6 - 10.4 mg/dL    Sodium 136 135 - 144 mmol/L    Potassium 4.3 3.7 - 5.3 mmol/L    Chloride 99 98 - 107 mmol/L    CO2 29 20 - 31 mmol/L    Anion Gap 8 (L) 9 - 17 mmol/L    GFR Non-African American 57 (L) >60 mL/min    GFR  American >60 >60 mL/min    GFR Comment          GFR Staging NOT REPORTED    CBC auto differential    Collection Time: 11/21/21  5:48 AM   Result Value Ref Range    WBC 4.6 3.5 - 11.3 k/uL    RBC 3.89 (L) 3.95 - 5.11 m/uL    Hemoglobin 12.3 11.9 - 15.1 g/dL    Hematocrit 38.7 36.3 - 47.1 %    MCV 99.5 82.6 - 102.9 fL    MCH 31.6 25.2 - 33.5 pg    MCHC 31.8 28.4 - 34.8 g/dL    RDW 12.5 11.8 - 14.4 %    Platelets 757 143 - 866 k/uL    MPV 9.4 8.1 - 13.5 fL    NRBC Automated 0.0 0.0 per 100 WBC    Differential Type NOT REPORTED     Seg Neutrophils 49 36 - 65 %    Lymphocytes 38 24 - 43 %    Monocytes 10 3 - 12 %    Eosinophils % 2 1 - 4 %    Basophils 1 0 - 2 %    Immature Granulocytes 0 0 %    Segs Absolute 2.29 1.50 - 8.10 k/uL    Absolute Lymph # 1.74 1.10 - 3.70 k/uL    Absolute Mono # 0.45 0.10 - 1.20 k/uL    Absolute Eos # 0.07 0.00 - 0.44 k/uL    Basophils Absolute 0.04 0.00 - 0.20 k/uL    Absolute Immature Granulocyte <0.03 0.00 - 0.30 k/uL    WBC Morphology NOT REPORTED     RBC Morphology NOT REPORTED     Platelet Estimate NOT REPORTED      Recent Labs     11/21/21  0548   WBC 4.6   RBC 3.89*   HGB 12.3   HCT 38.7   MCV 99.5   MCH 31.6   MCHC 31.8   RDW 12.5      MPV 9.4     Recent Labs     11/21/21  0548      K 4.3   CL 99   CO2 29   BUN 13   CREATININE 0.99*   GLUCOSE 89   CALCIUM 9.2     No results found for: LABA1C    Assessment :      Primary Problem  <principal problem not specified>    Active Hospital Problems    Diagnosis Date Noted    Lumbar stenosis with neurogenic claudication [M48.062]     Spinal deformity [Q76.49]     Cervical myelopathy ([facility]) [G95.9] 11/19/2021        Patient is a 62 y.o. Non- / non  female hx of chronic back and neck pain s/p multiple spine surgeries, worsening lower extremity weakness for 2 years and worsening b/l upper extremity weakness.  Referred from clinic for MRI cervical, thoracic and lumbar without contrast.   Imaging completed, neurosurgery recommending outpatient follow-up in clinic in 2 to 4 weeks. Will obtain PMNR evaluation, PT/OT and anticipate discharge home tomorrow.       Plan:     - MRI cervical, thoracic, lumbar spine without contrast completed and reviewed  - Per neurosurgery, no neurosurgical intervention at this time, outpatient follow-up in 2 to 4 weeks  - Awaiting PM&R eval  - PT/OT  - DVT prophylaxis  -Continue methadone and hydrocodone          Follow-up further recommendations after discussing the case with attending  The plan was discussed with the patient, patient's family and the medical staff. Consultations:   IP CONSULT TO PHYSICAL MEDICINE REHAB  IP CONSULT TO NEUROSURGERY    Patient is admitted as inpatient status because of co-morbidities listed above, severity of signs and symptoms as outlined, requirement for current medical therapies and most importantly because of direct risk to patient if care not provided in a hospital setting. Mayra Justice MD  11/21/2021  3:05 PM    Copy sent to Dr. Andrea Ramires primary care provider on file.

## 2021-11-21 NOTE — PROGRESS NOTES
[employer]up[employer]  Occupational Therapy Not Seen Note    DATE: 2021    NAME: Elma Howe  MRN: [id number]   : 1960      Patient not seen this date for Occupational Therapy due to: Other: Per chart \"Neurosurgery consulted for recommendations regarding these MRI findings and progressive muscle weakness and sensation\". Will await neurosurgery consult and recommendations prior to EOB/OOB activity with occupational therapy.      Next Scheduled Treatment: Check back as able     Electronically signed by Satish Echeverria OT on 2021 at 7:26 AM

## 2021-11-21 NOTE — CONSULTS
Department of Neurosurgery                                       Resident Consult Note      Reason for Consult:  Weakness and numbness bilateral upper/lower extremities  Requesting Physician:  Dr. Shaina Pearson  Neurosurgeon: Dr. Thais Sanon    History Obtained From:  patient, electronic medical record    CHIEF COMPLAINT:         Numbness in arms and legs, progressive weakness    HISTORY OF PRESENT ILLNESS:       The patient is a 62 y.o. female who presents with long history of bilateral lower extremity and upper extremity weakness and numbness. Patient states her symptoms have progressed over several months to years. She has had previous back surgeries with back pain dating back to 2010. She notes she had a cervical and lumbar surgery but is uncertain what was done. According to EMR, it appears she had laminectomies at C3-C6. Uncertain what procedure was done on lumbar spine. She denies any new trauma or injuries. Numbness in the majority of the right leg preceded numbness in other extremities due to sequela of previous lumbar surgery. Over time, she feels she has lost sensation in both legs, and in bilateral arms, particularly on the ulnar side of the arm and into the last 2-3 digits of the hand. She also describes some saddle anesthesia, and increasing difficulty controlling her bowel and bladder for a few months. Patient lives at home with her partner, and uses a walker to ambulate. She has had increasing difficulty with gait balance. The majority of her pain is in the cervical region. She is seen by pain management and takes hydrocodone and methadone. Patient was admitted to neurology team for further work-up. MRI of the lumbar spine demonstrated severe spinal canal stenosis at L2-3, L3-4, L4-5 with moderate spinal canal narrowing at L1-2. Levoscoliosis with anterolisthesis at L3-4. Mild endplate edema at W4-5 with fluid in the intervertebral disc space.   MRI of the cervical spine shows moderate to severe spinal canal stenosis at C3-4, moderate spinal canal stenosis at C4-5, C5-6, C7-T1. Areas of hyperintensity in the cord at C4-5 and C6, likely myelomalacia. Neurosurgery consulted for recommendations regarding these MRI findings and progressive muscle weakness and sensation. PAST MEDICAL HISTORY :       Past Medical History:        Diagnosis Date    Arthritis     Cancer ([address]     right breast    GERD (gastroesophageal reflux disease)     History of stomach ulcers     Hypertension     Hypothyroidism     Lumbar neuritis 2016    Nausea & vomiting     Post laminectomy syndrome 2016    Stress incontinence     Thyroid disease        Past Surgical History:        Procedure Laterality Date    BACK SURGERY      x 2    BREAST SURGERY Right     mastectomy with reconstruction    CERVICAL LAMINECTOMY  2014    cervicle laminectomy c4-c6    HERNIA REPAIR Bilateral     inguinal    HYSTERECTOMY, TOTAL ABDOMINAL      MASTECTOMY, RADICAL         Social History:   Social History     Socioeconomic History    Marital status: Single     Spouse name: Not on file    Number of children: Not on file    Years of education: Not on file    Highest education level: Not on file   Occupational History    Not on file   Tobacco Use    Smoking status: Former Smoker     Packs/day: 0.50     Years: 30.00     Pack years: 15.00     Quit date:      Years since quittin.8    Smokeless tobacco: Never Used   Vaping Use    Vaping Use: Every day    Substances: Nicotine   Substance and Sexual Activity    Alcohol use:  Yes     Alcohol/week: 0.0 standard drinks     Comment: 3 whiskey drinks per day    Drug use: No    Sexual activity: Not on file   Other Topics Concern    Not on file   Social History Narrative    Not on file     Social Determinants of Health     Financial Resource Strain: Low Risk     Difficulty of Paying Living Expenses: Not very hard   Food Insecurity: No Food Insecurity  Worried About Running Out of Food in the Last Year: Never true    Rodriguez of Food in the Last Year: Never true   Transportation Needs: No Transportation Needs    Lack of Transportation (Medical): No    Lack of Transportation (Non-Medical): No   Physical Activity:     Days of Exercise per Week: Not on file    Minutes of Exercise per Session: Not on file   Stress:     Feeling of Stress : Not on file   Social Connections:     Frequency of Communication with Friends and Family: Not on file    Frequency of Social Gatherings with Friends and Family: Not on file    Attends Synagogue Services: Not on file    Active Member of [address] [place] [address] [employer] or Organizations: Not on file    Attends Club or Organization Meetings: Not on file    Marital Status: Not on file   Intimate Partner Violence:     Fear of Current or Ex-Partner: Not on file    Emotionally Abused: Not on file    Physically Abused: Not on file    Sexually Abused: Not on file   Housing Stability:     Unable to Pay for Housing in the Last Year: Not on file    Number of [place] in the Last Year: Not on file    Unstable Housing in the Last Year: Not on file       Family History:       Problem Relation Age of Onset    Hypertension Mother     Heart Disease Mother     Cancer Mother        Allergies:  Latex and Kiwi extract    Home Medications:  Prior to Admission medications    Medication Sig Start Date End Date Taking?  Authorizing Provider   tiZANidine (ZANAFLEX) 4 MG tablet Take 4 mg by mouth every 12 hours    Historical Provider, MD   dilTIAZem (DILACOR XR) 120 MG extended release capsule TAKE ONE CAPSULE BY MOUTH DAILY 11/1/21   Kwaku Lopez MD   levothyroxine (SYNTHROID) 88 MCG tablet Take 1 tablet by mouth Daily 9/16/21 11/19/21  Kwaku Lopez MD   Potassium Gluconate 595 (99 K) MG TABS Take 1 tablet by mouth daily 9/16/21   Kwaku Lopez MD   pantoprazole (PROTONIX) 40 MG tablet Take 1 tablet by mouth daily 9/16/21   Kwaku Lopez MD busPIRone (BUSPAR) 30 MG tablet Take 30 mg by mouth daily 9/16/21   Matt Meraz MD   Vitamin D, Ergocalciferol, 50 MCG (8389 UT) CAPS TAKE ONE CAPSULE BY MOUTH DAILY 3/29/21   Isiah Lara PA-C   lubiprostone (AMITIZA) 24 MCG capsule Take 24 mcg by mouth 2 times daily (with meals)  Patient not taking: Reported on 10/22/2021 7/29/20   Historical Provider, MD   potassium chloride (K-TAB) 10 MEQ extended release tablet Take 10 mEq by mouth 2 times daily  Patient not taking: Reported on 11/19/2021 7/27/20   Historical Provider, MD   HYDROcodone-acetaminophen (NORCO) 7.5-325 MG per tablet Up to three tablets a day. 10/21/15   Historical Provider, MD   methadone (DOLOPHINE) 10 MG tablet Take 10 mg by mouth 2 times daily.     Historical Provider, MD       Current Medications:   Current Facility-Administered Medications: sodium chloride flush 0.9 % injection 5-40 mL, 5-40 mL, IntraVENous, 2 times per day  sodium chloride flush 0.9 % injection 5-40 mL, 5-40 mL, IntraVENous, PRN  0.9 % sodium chloride infusion, 25 mL, IntraVENous, PRN  enoxaparin (LOVENOX) injection 40 mg, 40 mg, SubCUTAneous, Daily  ondansetron (ZOFRAN-ODT) disintegrating tablet 4 mg, 4 mg, Oral, Q8H PRN **OR** ondansetron (ZOFRAN) injection 4 mg, 4 mg, IntraVENous, Q6H PRN  polyethylene glycol (GLYCOLAX) packet 17 g, 17 g, Oral, Daily PRN  acetaminophen (TYLENOL) tablet 650 mg, 650 mg, Oral, Q6H PRN **OR** acetaminophen (TYLENOL) suppository 650 mg, 650 mg, Rectal, Q6H PRN  busPIRone (BUSPAR) tablet 30 mg, 30 mg, Oral, Daily  dilTIAZem (CARDIZEM 12 HR) extended release capsule 120 mg, 120 mg, Oral, Daily  levothyroxine (SYNTHROID) tablet 88 mcg, 88 mcg, Oral, Daily  tiZANidine (ZANAFLEX) tablet 4 mg, 4 mg, Oral, Q12H  methadone (DOLOPHINE) tablet 10 mg, 10 mg, Oral, BID  HYDROcodone-acetaminophen (NORCO) 5-325 MG per tablet 1.5 tablet, 1.5 tablet, Oral, TID PRN    REVIEW OF SYSTEMS:       CONSTITUTIONAL: negative for fatigue and malaise EYES: negative for double vision and photophobia    HEENT: negative for tinnitus and sore throat   RESPIRATORY: negative for cough, shortness of breath   CARDIOVASCULAR: negative for chest pain, palpitations   GASTROINTESTINAL: negative for nausea, vomiting   GENITOURINARY: negative for incontinence   MUSCULOSKELETAL: Positive for neck or back pain   NEUROLOGICAL: negative for seizures   PSYCHIATRIC: negative for agitated     Review of systems otherwise negative. PHYSICAL EXAM:       BP (!) 146/82   Pulse 83   Temp 98.2 °F (36.8 °C) (Oral)   Resp 16   Ht 5' 1\" (1.549 m)   Wt 130 lb (59 kg)   SpO2 93%   BMI 24.56 kg/m²       CONSTITUTIONAL:  Well developed, well nourished, alert and oriented x 3, in no acute distress. GCS 15, nontoxic. No dysarthria, no aphasia. EOMI.     HEAD:  normocephalic, atraumatic    EYES:  PERRLA, EOMI.   ENT:  moist mucous membranes   NECK:  supple, symmetric, no midline tenderness to palpation    BACK:  without midline tenderness, step-offs or deformities    LUNGS:  Equal air entry bilaterally   CARDIOVASCULAR:  normal s1 / s2   ABDOMEN:  Soft, no rigidity   NEUROLOGIC:  EYE OPENING     Spontaneous - 4 [x]       To voice - 3 []       To pain - 2 []       None - 1 []    VERBAL RESPONSE     Appropriate, oriented - 5 [x]       Dazed or confused - 4 []       Syllables, expletives - 3 []       Grunts - 2 []       None - 1 []    MOTOR RESPONSE     Spontaneous, command - 6 [x]       Localizes pain - 5 []       Withdraws pain - 4 []       Abnormal flexion - 3 []       Abnormal extension - 2 []       None - 1 []            Total GCS: 15    Mental Status:  A & O x3, awake             Cranial Nerves:    cranial nerves II-XII are grossly intact    Motor Exam:    Drift:  absent  Tone:  normal    Motor exam is 5 out of 5 all extremities with the exception of 4/5 in the LLE throughout, 4+/5 in the LUE throughout    Sensory:    Touch:    Right Upper Extremity:  abnormal -altered sensation in L4-5.  There is moderate spinal canal narrowing at L1-2.   2. Advanced multilevel lateral recess and neural foraminal stenosis as above. 3. Levoscoliosis with anterolisthesis at L3-4.   4. There is mild endplate edema at V3-8 with fluid in the intervertebral disc   space. This is likely degenerative with Modic type 1 marrow change. Correlation for any signs or symptoms of infection is recommended. MRI THORACIC SPINE WO CONTRAST   Final Result   1. No acute abnormality of the thoracic spine or finding to suggest etiology   of patient's symptoms. 2. Mild multilevel spinal canal stenosis. 3. Mild smooth thoracic dextroscoliosis and 2 mm degenerative anterolisthesis   of T2 on T3.                 ASSESSMENT AND PLAN:       Patient Active Problem List   Diagnosis    Cervical stenosis of spine    Essential hypertension    Hypothyroidism    Lumbar radiculopathy, chronic    Lumbar neuritis    Post laminectomy syndrome    Hypokalemia    Pain of right hip joint    Post-menopausal osteoporosis    Chronic gastritis without bleeding    Elevated CEA    Esophageal dysphagia    Hypotension due to drugs    Ulcerative esophagitis    Weight loss, abnormal    Cervical myelopathy (HCC)         A/P:  This is a 62 y.o. female with months to years of progressive lower extremity and upper extremity weakness associated with numbness in the lower extremities and altered sensation in the upper extremities. MRI of the spine demonstrates multiple levels of severe spinal canal stenosis in the lumbar spine in addition to moderate spinal canal stenosis in the cervical spine. Patient has history of previous back surgeries, C3-C6 laminectomy and unknown procedure in the the lumbar spine.      Patient care will be discussed with attending, will reevaluate patient along with attending     - Neurosurgical intervention to be discussed with attending    - NPO until evaluated by neurosurgeon   - Neuro checks per protocol  - Hold all antiplatelets and anticoagulants until possibility of intervention discussed   - Pain control per primary; continue home meds    Additional recommendations may follow    Please contact neurosurgery with any changes in patients neurologic status. Thank you for your consult.        DO ANABELA Saul pager [phone]  11/20/2021  9:46 PM

## 2021-11-22 VITALS
HEIGHT: 61 IN | OXYGEN SATURATION: 99 % | DIASTOLIC BLOOD PRESSURE: 83 MMHG | RESPIRATION RATE: 16 BRPM | SYSTOLIC BLOOD PRESSURE: 155 MMHG | TEMPERATURE: 98 F | BODY MASS INDEX: 24.55 KG/M2 | HEART RATE: 80 BPM | WEIGHT: 130 LBS

## 2021-11-22 LAB
ANION GAP SERPL CALCULATED.3IONS-SCNC: 10 MMOL/L (ref 9–17)
BUN BLDV-MCNC: 13 MG/DL (ref 8–23)
BUN/CREAT BLD: NORMAL (ref 9–20)
CALCIUM SERPL-MCNC: 9.2 MG/DL (ref 8.6–10.4)
CHLORIDE BLD-SCNC: 100 MMOL/L (ref 98–107)
CO2: 25 MMOL/L (ref 20–31)
CREAT SERPL-MCNC: 0.87 MG/DL (ref 0.5–0.9)
GFR AFRICAN AMERICAN: >60 ML/MIN
GFR NON-AFRICAN AMERICAN: >60 ML/MIN
GFR SERPL CREATININE-BSD FRML MDRD: NORMAL ML/MIN/{1.73_M2}
GFR SERPL CREATININE-BSD FRML MDRD: NORMAL ML/MIN/{1.73_M2}
GLUCOSE BLD-MCNC: 93 MG/DL (ref 70–99)
POTASSIUM SERPL-SCNC: 4.3 MMOL/L (ref 3.7–5.3)
SODIUM BLD-SCNC: 135 MMOL/L (ref 135–144)

## 2021-11-22 PROCEDURE — 97162 PT EVAL MOD COMPLEX 30 MIN: CPT

## 2021-11-22 PROCEDURE — 6370000000 HC RX 637 (ALT 250 FOR IP): Performed by: STUDENT IN AN ORGANIZED HEALTH CARE EDUCATION/TRAINING PROGRAM

## 2021-11-22 PROCEDURE — 99239 HOSP IP/OBS DSCHRG MGMT >30: CPT | Performed by: PSYCHIATRY & NEUROLOGY

## 2021-11-22 PROCEDURE — APPSS30 APP SPLIT SHARED TIME 16-30 MINUTES: Performed by: NURSE PRACTITIONER

## 2021-11-22 PROCEDURE — 97535 SELF CARE MNGMENT TRAINING: CPT

## 2021-11-22 PROCEDURE — 80048 BASIC METABOLIC PNL TOTAL CA: CPT

## 2021-11-22 PROCEDURE — 36415 COLL VENOUS BLD VENIPUNCTURE: CPT

## 2021-11-22 PROCEDURE — 99232 SBSQ HOSP IP/OBS MODERATE 35: CPT | Performed by: PSYCHIATRY & NEUROLOGY

## 2021-11-22 PROCEDURE — 99239 HOSP IP/OBS DSCHRG MGMT >30: CPT | Performed by: NURSE PRACTITIONER

## 2021-11-22 PROCEDURE — 97166 OT EVAL MOD COMPLEX 45 MIN: CPT

## 2021-11-22 PROCEDURE — 99222 1ST HOSP IP/OBS MODERATE 55: CPT | Performed by: STUDENT IN AN ORGANIZED HEALTH CARE EDUCATION/TRAINING PROGRAM

## 2021-11-22 PROCEDURE — 97530 THERAPEUTIC ACTIVITIES: CPT

## 2021-11-22 RX ADMIN — DILTIAZEM HYDROCHLORIDE 120 MG: 60 CAPSULE, EXTENDED RELEASE ORAL at 09:07

## 2021-11-22 RX ADMIN — METHADONE HYDROCHLORIDE 10 MG: 10 TABLET ORAL at 09:07

## 2021-11-22 RX ADMIN — HYDROCODONE BITARTRATE AND ACETAMINOPHEN 1.5 TABLET: 5; 325 TABLET ORAL at 07:23

## 2021-11-22 RX ADMIN — PANTOPRAZOLE SODIUM 40 MG: 40 TABLET, DELAYED RELEASE ORAL at 09:06

## 2021-11-22 RX ADMIN — LEVOTHYROXINE SODIUM 88 MCG: 88 TABLET ORAL at 09:07

## 2021-11-22 ASSESSMENT — PAIN SCALES - GENERAL
PAINLEVEL_OUTOF10: 7
PAINLEVEL_OUTOF10: 8
PAINLEVEL_OUTOF10: 7

## 2021-11-22 ASSESSMENT — PAIN DESCRIPTION - LOCATION
LOCATION: BACK;NECK

## 2021-11-22 ASSESSMENT — ENCOUNTER SYMPTOMS
ABDOMINAL PAIN: 0
SHORTNESS OF BREATH: 0
BACK PAIN: 1
DOUBLE VISION: 0
BLURRED VISION: 0

## 2021-11-22 ASSESSMENT — PAIN DESCRIPTION - PAIN TYPE
TYPE: CHRONIC PAIN
TYPE: CHRONIC PAIN

## 2021-11-22 NOTE — PLAN OF CARE
Problem: Falls - Risk of:  Goal: Will remain free from falls  Description: Will remain free from falls  11/22/2021 1438 by Amy Hall RN  Outcome: Completed  11/22/2021 1255 by Amy Hall RN  Outcome: Met This Shift  Goal: Absence of physical injury  Description: Absence of physical injury  11/22/2021 1438 by Amy Hall RN  Outcome: Completed  11/22/2021 1255 by Amy Hall RN  Outcome: Met This Shift     Problem: Pain:  Goal: Pain level will decrease  Description: Pain level will decrease  11/22/2021 1438 by Amy Hall RN  Outcome: Completed  11/22/2021 1255 by Amy Hall RN  Outcome: Ongoing  Goal: Control of acute pain  Description: Control of acute pain  11/22/2021 1438 by Amy Hall RN  Outcome: Completed  11/22/2021 1255 by Amy Hall RN  Outcome: Ongoing  Goal: Control of chronic pain  Description: Control of chronic pain  11/22/2021 1438 by Amy Hall RN  Outcome: Completed  11/22/2021 1255 by Amy Hall RN  Outcome: Ongoing     Problem: Musculor/Skeletal Functional Status  Goal: Highest potential functional level  11/22/2021 1438 by Amy Hall RN  Outcome: Completed  11/22/2021 1255 by Amy Hall RN  Outcome: Ongoing

## 2021-11-22 NOTE — PLAN OF CARE
Problem: Falls - Risk of:  Goal: Will remain free from falls  Description: Will remain free from falls  Outcome: Met This Shift  Goal: Absence of physical injury  Description: Absence of physical injury  Outcome: Met This Shift     Problem: Pain:  Goal: Pain level will decrease  Description: Pain level will decrease  Outcome: Ongoing  Goal: Control of acute pain  Description: Control of acute pain  Outcome: Ongoing  Goal: Control of chronic pain  Description: Control of chronic pain  Outcome: Ongoing     Problem: Musculor/Skeletal Functional Status  Goal: Highest potential functional level  Outcome: Ongoing

## 2021-11-22 NOTE — PROGRESS NOTES
Occupational Therapy   Occupational Therapy Initial Assessment  Date: 2021   Patient Name: Diana Hylton  MRN: [id number]     : 1960    Date of Service: 2021   Obtained from medical chart:   \" 41-year-old female with past medical history of arthritis, hypertension, postlaminectomy syndrome, hypothyroidism, back pain status post laminectomies C3-C4 was referred from neurology clinic with complaint of gait imbalance, worsening limb weakness along with arm weakness and trouble lifting. She has been using walker for the past 2 years but had back issues since . She has been feeling more weakness, stiffness along with difficulty controlling her bowels and bladder for the past few months. She also noted weakness of the bilateral upper extremity more pronounced in the left arm with difficulty raising it above the shoulder and associated with numbness and tingling sensation. She also has moderate to severe back pain and follows with pain management and is currently on hydrocodone 3 times daily and methadone twice daily. \"    Discharge Recommendations:  Patient would benefit from continued therapy after discharge. OT Equipment Recommendations  Equipment Needed: Yes  Mobility Devices: ADL Assistive Devices  ADL Assistive Devices: Shower Chair with back    Assessment   Performance deficits / Impairments: Decreased ADL status; Decreased sensation; Decreased balance; Decreased safe awareness; Decreased high-level IADLs; Decreased posture; Decreased functional mobility ; Decreased strength  Assessment: Pt supine in bed upon arrival. OT facilitated bed mobility to EOB at [facility]. OT facilitated functional transfers/mobility for household distances and to/from the bathroom at [facility] w/ RW. Pt required vc for positioning of RW and increased time throughout d/t decreased balance and safety awareness w/ RW. OT facilitated oral hygiene and combing hair while sinkside, see ADL assist level below.  OT facilitated doffing/donning socks at SBA while sitting unsupported in bedside recliner, pt demonstrated good figure four to complete. Pt would benefit from acute OT services to address deficits in strength, balance, and safety awareness to promote engagement in ADLs/IADLs/functional tasks. Prognosis: Good  Decision Making: Medium Complexity  Patient Education: OT role, OT poc, purpose of evaluation, transfer training, safety awareness - good return  REQUIRES OT FOLLOW UP: Yes  Activity Tolerance  Activity Tolerance: Patient Tolerated treatment well  Safety Devices  Safety Devices in place: Yes  Type of devices: Left in chair; Call light within reach; Chair alarm in place; Gait belt  Restraints  Initially in place: No         Patient Diagnosis(es): There were no encounter diagnoses. has a past medical history of Arthritis, Cancer ([facility] [address] GERD (gastroesophageal reflux disease), History of stomach ulcers, Hypertension, Hypothyroidism, Lumbar neuritis, Nausea & vomiting, Post laminectomy syndrome, Stress incontinence, and Thyroid disease. has a past surgical history that includes back surgery; hernia repair (Bilateral); Breast surgery (Right); cervical laminectomy (11/04/2014); Mastectomy, radical; and Hysterectomy, total abdominal.       Restrictions  Restrictions/Precautions  Restrictions/Precautions: General Precautions  Required Braces or Orthoses?: No  Position Activity Restriction  Other position/activity restrictions: Up w/ assist    Subjective   General  Patient assessed for rehabilitation services?: Yes  Family / Caregiver Present: No  General Comment  Comments: RN okay'reji OT/PT evaluation. Pt agreeable and participatory throughout. Patient Currently in Pain: Yes  Pain Assessment  Pain Assessment: 0-10  Pain Level: 7  Pain Type: Chronic pain  Pain Location: Back; Neck  Non-Pharmaceutical Pain Intervention(s): Ambulation/Increased Activity; Distraction;  Therapeutic presence; Repositioned  Response to Pain Intervention: Patient Satisfied  Vital Signs  Patient Currently in Pain: Yes     Social/Functional History  Social/Functional History  Lives With: Significant other  Type of Home: House  Home Layout: One level  Home Access: Stairs to enter with rails  Entrance Stairs - Number of Steps: 1  Entrance Stairs - Rails: None  Bathroom Shower/Tub: Tub/Shower unit  Bathroom Toilet: Standard  Bathroom Equipment: Toilet raiser  Home Equipment: Rolling walker, 4 wheeled walker, Lift chair  ADL Assistance: [address]: [address] [facility] Responsibilities: Yes  Ambulation Assistance: Independent (Pt reports using RW around the house and rollator out in the community)  Transfer Assistance: Needs assistance (Pt reports significant other assists getting in/out of truck and bathtub)  Active : No  Patient's  Info: Pt's significant other  Mode of Transportation: Truck  Occupation: On disability  Leisure & Hobbies: Gardening  Additional Comments: Pt reports significant other works outside the home from 7a-3p, able to assist as needed before and after work. Pt reports sister lives down the street and could assist during the day PRN. Objective   Vision: Impaired  Vision Exceptions: Wears glasses for reading  Hearing: Within functional limits    Orientation  Overall Orientation Status: Within Functional Limits     Balance  Sitting Balance: Stand by assistance (~15 min EOB unsupported and in bedside recliner unsupported; intermittent dynamic for functional tasks.)  Standing Balance: Contact guard assistance  Standing Balance  Time: ~5 min  Activity: EOB, static; sinkside, dynamic  Comment: Pt required vc for posture upon standing from EOB  Functional Mobility  Functional - Mobility Device: Rolling Walker  Activity: To/from bathroom; Other (Household distances)  Assist Level: Contact guard assistance  Functional Mobility Comments: Pt required vc for positioning of RW and increased time to complete. Recommendations: Strengthening, Balance Training, Functional Mobility Training, Equipment Evaluation, Education, & procurement, Patient/Caregiver Education & Training, Self-Care / ADL, Safety Education & Training, Home Management Training    AM-PAC Score  AM-PAC Inpatient Daily Activity Raw Score: 20 (11/22/21 1058)  AM-PAC Inpatient ADL T-Scale Score : 42.03 (11/22/21 1058)  ADL Inpatient CMS 0-100% Score: 38.32 (11/22/21 1058)  ADL Inpatient CMS G-Code Modifier : Nehal Laureano (11/22/21 1058)    Goals  Short term goals  Time Frame for Short term goals: Patient will, by discharge  Short term goal 1: Demo UB ADLs at [facility]  Short term goal 2: Demo LB ADLs/toileting at [facility] w/ AE PRN  Short term goal 3: Demo functional transfers/mobility at [facility] w/ LRD PRN to engage in ADLs  Short term goal 4: Demo 10+ min of dynamic standing at [facility] w/ unilateral/bilateral hand release to engage in ADLs  Short term goal 5: Demo good safety awareness w/ RW during functional tasks w/ > 2 vc     Therapy Time   Individual Concurrent Group Co-treatment   Time In 0751         Time Out 0815         Minutes 24         Timed Code Treatment Minutes: [address], S/OT

## 2021-11-22 NOTE — CARE COORDINATION
Transitional plan\"    Per Dr Aurelio Casillas pt is approp for IP Rehab however pts goal is home. Daily labs, pt/ot following.      Discharge [address] [facility]  Clinical Case Management Department  Written by: Jp Luque RN    Patient Name: Diana Hylton  Attending Provider: Xiang Barber MD  Admit Date: 2021 12:08 PM  MRN: [id number]  Account: [de-identified]                     : 1960  Discharge Date: 2021      Disposition: home    Jp Luque RN

## 2021-11-22 NOTE — CONSULTS
Physical Medicine & Rehabilitation  Consult Note      Admitting Physician:  Jesse Monsalve MD    Primary Care Provider:  No primary care provider on file. Reason for Consult:  Acute Inpatient Rehabilitation    Chief Complaint:  Weakness, gait imbalance    History of Present Illness:  Referring Provider is requesting an evaluation for appropriate placement upon discharge from acute care. History from chart review and patient. Benedicto Malave is a 62 y.o. female with history of HTN, hypothyroidism, breast cancer, GERD, peptic ulcer disease, and chronic back pain admitted to [facility] on 11/20/2021. She initially presented from neurology clinic with gait imbalance and worsening limb weakness. MRI cervical spine showed multilevel degenerative changes with moderate-to-severe spinal canal stenosis at C3-C4, moderate stenosis at C4-C5, and mild-to-moderate stenosis at C5-C6 and C7-T1 with likely myelomalacia at C4-C5 and C6. MRI lumbar spine showed advanced multilevel degenerative changes with severe spinal canal stenosis at L2-L3, L3-L4, and L4-L5 and moderate stenosis at L1-L2. Neurosurgery is recommending outpatient follow up for surgical planning. She currently reports ongoing weakness in all limbs, with worsening bilateral upper limb weakness recently. She also notes numbness/tingling in the hands as well as chronic neck and back pain. Review of Systems:  Review of Systems   Constitutional: Negative for fever. HENT: Negative for hearing loss. Eyes: Negative for blurred vision and double vision. Respiratory: Negative for shortness of breath. Cardiovascular: Negative for chest pain. Gastrointestinal: Negative for abdominal pain. No change in bowel control   Genitourinary:        No change in bladder control   Musculoskeletal: Positive for back pain and neck pain. Skin: Negative for rash. Neurological: Positive for sensory change and weakness. Negative for headaches. Premorbid function:  Independent    Current function:    PT:  Restrictions/Precautions: General Precautions  Other position/activity restrictions: Up w/ assist   Transfers  Sit to Stand: Stand by assistance  Stand to sit: Stand by assistance  Ambulation 1  Surface: level tile  Device: Rolling Walker  Assistance: Contact guard assistance  Distance: amb 50 ft with a RW x CGA    Transfers  Sit to Stand: Stand by assistance  Stand to sit: Stand by assistance  Ambulation  Ambulation?: Yes  Ambulation 1  Surface: level tile  Device: Rolling Walker  Assistance: Contact guard assistance  Distance: amb 50 ft with a RW x CGA    Surface: level tile  Ambulation 1  Surface: level tile  Device: Rolling Walker  Assistance: Contact guard assistance  Distance: amb 50 ft with a RW x CGA      OT:   ADL  Feeding: Independent  Grooming: Independent  UE Bathing: Stand by assistance, Increased time to complete, Setup  LE Bathing: Contact guard assistance, Increased time to complete, Setup  UE Dressing: Stand by assistance, Increased time to complete, Setup  LE Dressing: Setup, Contact guard assistance, Increased time to complete  Toileting: Contact guard assistance, Increased time to complete  Additional Comments: Pt demonstrated oral hygiene and combing hair Ind while standing sinkside. Pt able to manage containers and demonstrated forward flexion towards sink without LOB. Pt demonstrated good ROM to complete combing hair. OT facilitated doffing/donning socks at SBA while sitting in bedside recliner. Pt demonstrated good use of figure four technique to complete.          Balance  Sitting Balance: Stand by assistance (~15 min EOB unsupported and in bedside recliner unsupported; intermittent dynamic for functional tasks.)  Standing Balance: Contact guard assistance   Standing Balance  Time: ~5 min  Activity: EOB, static; sinkside, dynamic  Comment: Pt required vc for posture upon standing from EOB  Functional Mobility  Functional - Mobility Device: Rolling Walker  Activity: To/from bathroom, Other (Household distances)  Assist Level: Contact guard assistance  Functional Mobility Comments: Pt required vc for positioning of RW     Bed mobility  Supine to Sit: Stand by assistance  Sit to Supine: Stand by assistance  Scooting: Stand by assistance  Comment: Completed w/ HOB elevated and no use of bedrail; pt retired to bedside recliner at end. Transfers  Sit to stand: Contact guard assistance  Stand to sit: Contact guard assistance  Transfer Comments: Pt required vc for hand placement w/ transfers. SLP:      Past Medical History:        Diagnosis Date    Arthritis     Cancer ([facility] [address]     right breast    GERD (gastroesophageal reflux disease)     History of stomach ulcers     Hypertension     Hypothyroidism     Lumbar neuritis 9/8/2016    Nausea & vomiting     Post laminectomy syndrome 9/8/2016    Stress incontinence     Thyroid disease        Past Surgical History:        Procedure Laterality Date    BACK SURGERY      x 2    BREAST SURGERY Right     mastectomy with reconstruction    CERVICAL LAMINECTOMY  11/04/2014    cervicle laminectomy c4-c6    HERNIA REPAIR Bilateral     inguinal    HYSTERECTOMY, TOTAL ABDOMINAL      MASTECTOMY, RADICAL         Allergies:     Allergies   Allergen Reactions    Latex Hives, Itching, Dermatitis and Rash    Kiwi Extract      Other reaction(s): Facial Swelling        Current Medications:   Current Facility-Administered Medications: pantoprazole (PROTONIX) tablet 40 mg, 40 mg, Oral, Daily  sodium chloride flush 0.9 % injection 5-40 mL, 5-40 mL, IntraVENous, 2 times per day  sodium chloride flush 0.9 % injection 5-40 mL, 5-40 mL, IntraVENous, PRN  0.9 % sodium chloride infusion, 25 mL, IntraVENous, PRN  enoxaparin (LOVENOX) injection 40 mg, 40 mg, SubCUTAneous, Daily  ondansetron (ZOFRAN-ODT) disintegrating tablet 4 mg, 4 mg, Oral, Q8H PRN **OR** ondansetron (ZOFRAN) injection 4 mg, 4 mg, IntraVENous, Q6H PRN  polyethylene glycol (GLYCOLAX) packet 17 g, 17 g, Oral, Daily PRN  acetaminophen (TYLENOL) tablet 650 mg, 650 mg, Oral, Q6H PRN **OR** acetaminophen (TYLENOL) suppository 650 mg, 650 mg, Rectal, Q6H PRN  busPIRone (BUSPAR) tablet 30 mg, 30 mg, Oral, Daily  dilTIAZem (CARDIZEM 12 HR) extended release capsule 120 mg, 120 mg, Oral, Daily  levothyroxine (SYNTHROID) tablet 88 mcg, 88 mcg, Oral, Daily  tiZANidine (ZANAFLEX) tablet 4 mg, 4 mg, Oral, Q12H  methadone (DOLOPHINE) tablet 10 mg, 10 mg, Oral, BID  HYDROcodone-acetaminophen (NORCO) 5-325 MG per tablet 1.5 tablet, 1.5 tablet, Oral, TID PRN    Family History:       Problem Relation Age of Onset    Hypertension Mother     Heart Disease Mother     Cancer Mother        Social History:  Lives With: Significant other  Type of Home: House  Home Layout: One level  Home Access: Stairs to enter with rails  Entrance Stairs - Number of Steps: 1  Entrance Stairs - Rails: None  Bathroom Shower/Tub: Tub/Shower unit  Bathroom Toilet: Standard  Bathroom Equipment: Toilet raiser  Home Equipment: Rolling walker, 4 wheeled walker, Lift chair  ADL Assistance: Independent  Homemaking Assistance: Independent  Homemaking Responsibilities: Yes  Ambulation Assistance: Independent (Pt reports using RW around the house and rollator out in the community)  Transfer Assistance: Needs assistance (Pt reports significant other assists getting in/out of truck and bathtub)  Active : No  Patient's  Info: Pt's significant other  Mode of Transportation: Truck  Occupation: On disability  Leisure & Hobbies: Gardening  Additional Comments: Pt reports significant other works outside the home from 7a-3p, able to assist as needed before and after work. Pt reports sister lives down the street and could assist during the day PRN.   Social History     Socioeconomic History    Marital status: Single     Spouse name: Not on file    Number of children: Not on file    Years of education: Not on file    Highest education level: Not on file   Occupational History    Not on file   Tobacco Use    Smoking status: Former Smoker     Packs/day: 0.50     Years: 30.00     Pack years: 15.00     Quit date:      Years since quittin.8    Smokeless tobacco: Never Used   Vaping Use    Vaping Use: Every day    Substances: Nicotine   Substance and Sexual Activity    Alcohol use: Yes     Alcohol/week: 0.0 standard drinks     Comment: 3 whiskey drinks per day    Drug use: No    Sexual activity: Not on file   Other Topics Concern    Not on file   Social History Narrative    Not on file     Social Determinants of Health     Financial Resource Strain: Low Risk     Difficulty of Paying Living Expenses: Not very hard   Food Insecurity: No Food Insecurity    Worried About Running Out of Food in the Last Year: Never true    Rodriguez of Food in the Last Year: Never true   Transportation Needs: No Transportation Needs    Lack of Transportation (Medical): No    Lack of Transportation (Non-Medical):  No   Physical Activity:     Days of Exercise per Week: Not on file    Minutes of Exercise per Session: Not on file   Stress:     Feeling of Stress : Not on file   Social Connections:     Frequency of Communication with Friends and Family: Not on file    Frequency of Social Gatherings with Friends and Family: Not on file    Attends Protestant Services: Not on file    Active Member of Clubs or Organizations: Not on file    Attends Club or Organization Meetings: Not on file    Marital Status: Not on file   Intimate Partner Violence:     Fear of Current or Ex-Partner: Not on file    Emotionally Abused: Not on file    Physically Abused: Not on file    Sexually Abused: Not on file   Housing Stability:     Unable to Pay for Housing in the Last Year: Not on file    Number of [place] in the Last Year: Not on file    Unstable Housing in the Last Year: Not on file       Physical moderate to   severe spinal canal stenosis at C3-4, moderate spinal canal stenosis at C4-5,   and mild to moderate spinal canal stenosis at C5-6 and C7-T1.   2. Multilevel neural foraminal narrowing as above. 3. Areas of hyperintensity in the cord at C4-5 and C6, likely myelomalacia. 4. Grade 1 anterolisthesis at C3-4 and C4-5. MRI LUMBAR SPINE WO CONTRAST   Final Result   1. Advanced multilevel degenerative changes with severe spinal canal stenosis   at L2-3, L3-4, and L4-5. There is moderate spinal canal narrowing at L1-2.   2. Advanced multilevel lateral recess and neural foraminal stenosis as above. 3. Levoscoliosis with anterolisthesis at L3-4.   4. There is mild endplate edema at P6-1 with fluid in the intervertebral disc   space. This is likely degenerative with Modic type 1 marrow change. Correlation for any signs or symptoms of infection is recommended. MRI THORACIC SPINE WO CONTRAST   Final Result   1. No acute abnormality of the thoracic spine or finding to suggest etiology   of patient's symptoms. 2. Mild multilevel spinal canal stenosis. 3. Mild smooth thoracic dextroscoliosis and 2 mm degenerative anterolisthesis   of T2 on T3. Impression:    1. Cervical stenosis with myelopathy  2. HTN  3. Hypothyroidism  4. History of breast cancer  5. GERD  6. Peptic ulcer disease  7. Chronic back pain    Recommendations:    1. Diagnosis:  Cervical stenosis with myelopathy  2. Therapy: Has PT/OT needs  3. Medical Necessity: As above  4. Support:  Lives with significant other  5. Rehab Recommendation: The patient does not meet criteria for acute inpatient rehabilitation at this time, as she is too high-functioning. She has minimal OT needs at this time. Additionally, patient feels like she is currently at her baseline level of function. 6. DVT Prophylaxis: Lovenox    It was my pleasure to evaluate Edwin Bravo today. Please call with questions.     Tonia Fraire MD

## 2021-11-22 NOTE — PROGRESS NOTES
Notified [place], [facility], that per Dr Maurisio Abarca pt is not currently approp for ARU as pt has no OT needs and pt states she is at baseline at this time.

## 2021-11-22 NOTE — PROGRESS NOTES
NEUROLOGY INPATIENT PROGRESS NOTE    11/22/2021         Current Exam:     Chart reviewed. Discussed with RN. Patient has been ambulatory in her room today with her walker. Discussed discharge plan and she states she would like to go home. She reports weakness is not acutely worse at this point in time and is agreeable to outpatient follow up with neurosurgery after discharge. Brief History:    Mitchell Martinez is a  62 y.o. female with H/O breast cancer, HTN, hypothyroidism, prior back surgery laminectomies C3-C4, postlaminectomy syndrome, who was admitted on 11/20/2021 after being referred from the neurology clinic with complaints of gait imbalance, worsening limb weakness along with arm weakness and trouble lifting. She reported feeling increased generalized weakness and stiffness along with difficulty controlling her bladder and bowels for the past few months. She also reported numbness and tingling into the left arm. She has a history of moderate to severe back pain follows with pain management; she currently takes hydrocodone 3 times daily and methadone twice daily. Neurosurgery was consulted; MRI cervical spine with myelomalacia of a prior decompressed region as well as spinal deformity with some hyperkyphosis with swan-neck deformity. Neurosurgery planning to follow in the outpatient office to further discuss surgery given her spinal deformity. No current facility-administered medications on file prior to encounter.      Current Outpatient Medications on File Prior to Encounter   Medication Sig Dispense Refill    tiZANidine (ZANAFLEX) 4 MG tablet Take 4 mg by mouth every 12 hours      dilTIAZem (DILACOR XR) 120 MG extended release capsule TAKE ONE CAPSULE BY MOUTH DAILY 30 capsule 4    levothyroxine (SYNTHROID) 88 MCG tablet Take 1 tablet by mouth Daily 90 tablet 0    Potassium Gluconate 595 (99 K) MG TABS Take 1 tablet by mouth daily 90 tablet 0    pantoprazole (PROTONIX) 40 MG tablet Take 1 tablet by mouth daily 90 tablet 0    busPIRone (BUSPAR) 30 MG tablet Take 30 mg by mouth daily 30 tablet 1    Vitamin D, Ergocalciferol, 50 MCG (2000 UT) CAPS TAKE ONE CAPSULE BY MOUTH DAILY 90 capsule 3    HYDROcodone-acetaminophen (NORCO) 7.5-325 MG per tablet Up to three tablets a day.  methadone (DOLOPHINE) 10 MG tablet Take 10 mg by mouth 2 times daily. Allergies: Sammi Méndez is allergic to latex and kiwi extract. Past Medical History:   Diagnosis Date    Arthritis     Cancer [facility])     right breast    GERD (gastroesophageal reflux disease)     History of stomach ulcers     Hypertension     Hypothyroidism     Lumbar neuritis 9/8/2016    Nausea & vomiting     Post laminectomy syndrome 9/8/2016    Stress incontinence     Thyroid disease        Past Surgical History:   Procedure Laterality Date    BACK SURGERY      x 2    BREAST SURGERY Right     mastectomy with reconstruction    CERVICAL LAMINECTOMY  11/04/2014    cervicle laminectomy c4-c6    HERNIA REPAIR Bilateral     inguinal    HYSTERECTOMY, TOTAL ABDOMINAL      MASTECTOMY, RADICAL         Social History: Sammi Méndez  reports that she quit smoking about 7 years ago. She has a 15.00 pack-year smoking history. She has never used smokeless tobacco. She reports current alcohol use. She reports that she does not use drugs.     Family History   Problem Relation Age of Onset    Hypertension Mother     Heart Disease Mother     Cancer Mother        Objective:   BP (!) 148/88   Pulse 77   Temp 98.1 °F (36.7 °C) (Oral)   Resp 16   Ht 5' 1\" (1.549 m)   Wt 130 lb (59 kg)   SpO2 96%   BMI 24.56 kg/m²     Blood pressure range: Systolic (33SID), DRS:895 , Min:148 , FHE:686   ; Diastolic (90LWW), LZH:43, Min:88, Max:88      Review of Systems:  Constitutional  Negative for fever and chills    HEENT  Negative for ear discharge, ear pain, nosebleed    Eyes  Negative for photophobia, pain and discharge    Respiratory Negative for hemoptysis and sputum    Cardiovascular  Negative for orthopnea, claudication and PND    Gastrointestinal  Negative for abdominal pain, diarrhea, blood in stool    Musculoskeletal  Negative for joint pain, negative for myalgia. + generalized weakness   Skin  Negative for rash or itching    Endo/heme/allergies  Negative for polydipsia, environmental allergy    Psychiatric/behavioral  Negative for suicidal ideation. Patient is not anxious        NEUROLOGIC EXAMINATION  GENERAL  Appears comfortable and in no distress   HEENT  NC/ AT   NECK  Supple   MENTAL STATUS:  Alert, oriented, intact memory, no confusion, normal speech, normal language, no hallucination or delusion   CRANIAL NERVES: II     -      Visual fields intact to confrontation  III,IV,VI -  EOMs full, no afferent defect, no DERECK, no ptosis  V     -     Normal facial sensation  VII    -     Normal facial symmetry  VIII   -     Intact hearing  IX,X -     Symmetrical palate  XI    -     Symmetrical shoulder shrug  XII   -     Midline tongue, no atrophy    MOTOR FUNCTION:  Full strength to BUE, 4-/5 to LLE, 4/5 to RLE with normal bulk, normal tone and no involuntary movements, no tremor   SENSORY FUNCTION:  Decreased sensation to bilateral arms and legs   CEREBELLAR FUNCTION:  Intact fine motor control over upper limbs   REFLEX FUNCTION:  Hyperreflexia throughout, no perverted reflex, sustained clonus to left ankle, no clonus to right ankle.  Positive Hoffmans bilaterally    STATION and GAIT  Steady ambulation with walker in her room       Data:    Lab Results:   CBC:   Recent Labs     11/21/21  0548   WBC 4.6   HGB 12.3        BMP:    Recent Labs     11/21/21  0548 11/22/21  0427    135   K 4.3 4.3   CL 99 100   CO2 29 25   BUN 13 13   CREATININE 0.99* 0.87   GLUCOSE 89 93         Lab Results   Component Value Date    CHOL 198 07/23/2020     (A) 07/23/2020    TRIG 96 07/23/2020    GLUF 109 07/23/2020    [id number] 533 10/22/2021 Diagnostic data reviewed:  MRI CERVICAL SPINE (11/20/21) -  1. Multilevel degenerative changes in the cervical spine with moderate to   severe spinal canal stenosis at C3-4, moderate spinal canal stenosis at C4-5,   and mild to moderate spinal canal stenosis at C5-6 and C7-T1.   2. Multilevel neural foraminal narrowing as above. 3. Areas of hyperintensity in the cord at C4-5 and C6, likely myelomalacia. 4. Grade 1 anterolisthesis at C3-4 and C4-5. MRI THORACIC SPINE (11/20/21) -  1. No acute abnormality of the thoracic spine or finding to suggest etiology   of patient's symptoms. 2. Mild multilevel spinal canal stenosis. 3. Mild smooth thoracic dextroscoliosis and 2 mm degenerative anterolisthesis   of T2 on T3. MRI LUMBAR SPINE (11/20/21) -  1. Advanced multilevel degenerative changes with severe spinal canal stenosis   at L2-3, L3-4, and L4-5.  There is moderate spinal canal narrowing at L1-2.   2. Advanced multilevel lateral recess and neural foraminal stenosis as above. 3. Levoscoliosis with anterolisthesis at L3-4.   4. There is mild endplate edema at Z2-9 with fluid in the intervertebral disc   space.  This is likely degenerative with Modic type 1 marrow change. Correlation for any signs or symptoms of infection is recommended. Impression:  -Progressive worsening of upper and lower extremity weakness  -Cervical stenosis with myelopathy  -Lumbar stenosis with neurogenic claudication  -Spinal deformity    Plan:  -Neurosurgery was consulted; they plan to follow-up in the outpatient clinic in 2-4 weeks for discussion of surgical planning  -PT/OT; a candidate for rehab but patient adamant of discharging home  -DVT prophylaxis; on Lovenox    Please note that this note was generated using a voice recognition dictation software. Although every effort was made to ensure the accuracy of this automated transcription, some errors in transcription may have occurred.

## 2021-11-22 NOTE — PROGRESS NOTES
Physical Therapy    Facility/Department: [address] ORTHO/MED SURG  Initial Assessment    NAME: Juan Pablo Christiansen  : 1960  MRN: [id number]    Date of Service: 2021   81-year-old female with past medical history of arthritis, hypertension, postlaminectomy syndrome, hypothyroidism, back pain status post laminectomies C3-C4 was referred from neurology clinic with complaint of gait imbalance, worsening limb weakness along with arm weakness and trouble lifting.  She has been using walker for the past 2 years but had back issues since .  She has been feeling more weakness, stiffness along with difficulty controlling her bowels and bladder for the past few months.  She also noted weakness of the bilateral upper extremity more pronounced in the left arm with difficulty raising it above the shoulder and associated with numbness and tingling sensation.  She also has moderate to severe back pain and follows with pain management and is currently on hydrocodone 3 times daily and methadone twice daily. \"  Discharge Recommendations:  Patient would benefit from continued therapy after discharge      Assessment   Body structures, Functions, Activity limitations: Decreased functional mobility ; Decreased endurance; Decreased strength  Assessment: The pt ambulated 50 ft with a RW x CGA. She reported fatigue with increased pain with mobilization. She could benefit from a continuation of PT for gait and strengthening following her DC  Prognosis: Good  Decision Making: Medium Complexity  PT Education: Goals; PT Role; Plan of Care  REQUIRES PT FOLLOW UP: Yes  Activity Tolerance  Activity Tolerance: Patient limited by fatigue       Patient Diagnosis(es): There were no encounter diagnoses.      has a past medical history of Arthritis, Cancer ([address] GERD (gastroesophageal reflux disease), History of stomach ulcers, Hypertension, Hypothyroidism, Lumbar neuritis, Nausea & vomiting, Post laminectomy syndrome, Stress incontinence, and Thyroid disease. has a past surgical history that includes back surgery; hernia repair (Bilateral); Breast surgery (Right); cervical laminectomy (11/04/2014);  Mastectomy, radical; and Hysterectomy, total abdominal.    Restrictions  Restrictions/Precautions  Restrictions/Precautions: General Precautions  Required Braces or Orthoses?: No  Position Activity Restriction  Other position/activity restrictions: Up w/ assist  Vision/Hearing  Vision: Impaired  Vision Exceptions: Wears glasses for reading  Hearing: Within functional limits     Subjective  General  Patient assessed for rehabilitation services?: Yes  Response To Previous Treatment: Not applicable  Family / Caregiver Present: No  Follows Commands: Within Functional Limits  Subjective  Subjective: RN and ppt agreeable to PT eval  Pain Screening  Patient Currently in Pain: Yes  Pain Assessment  Pain Assessment: 0-10  Pain Level: 7  Pain Location: Back; Neck  Vital Signs  Patient Currently in Pain: Yes     Orientation  Orientation  Overall Orientation Status: Within Normal Limits  Social/Functional History  Social/Functional History  Lives With: Significant other  Type of Home: House  Home Layout: One level  Home Access: Stairs to enter with rails  Entrance Stairs - Number of Steps: 1  Entrance Stairs - Rails: None  Bathroom Shower/Tub: Tub/Shower unit  Bathroom Toilet: Standard  Bathroom Equipment: Toilet raiser  Home Equipment: Rolling walker, 4 wheeled walker, Lift chair  ADL Assistance: Independent  Homemaking Assistance: Independent  Homemaking Responsibilities: Yes  Ambulation Assistance: Independent (Pt reports using RW around the house and rollator out in the community)  Transfer Assistance: Needs assistance (Pt reports significant other assists getting in/out of truck and bathtub)  Active : No  Patient's  Info: Pt's significant other  Mode of Transportation: Truck  Occupation: On disability  Leisure & Hobbies: Gardening  Additional Comments: Pt reports significant other works outside the home from 7a-3p, able to assist as needed before and after work. Pt reports sister lives down the street and could assist during the day PRN.   Cognition      Objective   AROM RLE (degrees)  RLE AROM: WNL  AROM LLE (degrees)  LLE AROM : WNL  AROM RUE (degrees)  RUE AROM : WNL  AROM LUE (degrees)  LUE AROM : WNL  Strength RLE  Strength RLE: WFL  Strength LLE  Strength LLE: WFL  Strength RUE  Strength RUE: WFL  Strength LUE  Strength LUE: WFL     Sensation  Overall Sensation Status: Impaired  Bed mobility  Supine to Sit: Stand by assistance  Sit to Supine: Stand by assistance  Scooting: Stand by assistance  Transfers  Sit to Stand: Stand by assistance  Stand to sit: Stand by assistance  Ambulation  Ambulation?: Yes  Ambulation 1  Surface: level tile  Device: Rolling Walker  Assistance: Contact guard assistance  Distance: amb 50 ft with a RW x CGA         Plan   Plan  Times per week: 5-6x wk  Current Treatment Recommendations: Strengthening, Functional Mobility Training, Gait Training, Safety Education & Training, Endurance Training, Stair training  Safety Devices  Type of devices: Nurse notified, Left in chair, Patient at risk for falls, Call light within reach, Chair alarm in place    G-Code     OutComes Score          AM-PAC Score  AM-PAC Inpatient Mobility Raw Score : 19 (11/22/21 1136)  AM-PAC Inpatient T-Scale Score : 45.44 (11/22/21 1136)  Mobility Inpatient CMS 0-100% Score: 41.77 (11/22/21 1136)  Mobility Inpatient CMS G-Code Modifier : CK (11/22/21 1136)        Goals  Short term goals  Time Frame for Short term goals: 10 visits  Short term goal 1: amb 150 ft with a RW x SBA  Short term goal 2: ascend/descend 4 steps with SBA  Short term goal 3: 20 min exercise program x SBA  Short term goal 4: to be independent with bed mobility  Patient Goals   Patient goals : Return home    Therapy Time   Individual Concurrent Group Co-treatment   Time In 0750         Time Out 6578         Minutes 25             1 of 135 [address]

## 2021-11-22 NOTE — DISCHARGE SUMMARY
[facility] Neurology  [address] [facility]    Discharge Summary     Patient ID: Marion Johnson  :  1960   MRN: [id number]     ACCOUNT:  [de-identified]   Patient's PCP: No primary care provider on file. Admit Date: 2021   Discharge Date: 2021     Length of Stay: 2  Code Status:  Full Code  Admitting Physician: James Alvarez MD  Discharge Physician: CASSANDRA Navarro CNP     Active Discharge Diagnoses:     Hospital Problem Lists:  Active Problems:    Cervical myelopathy [facility])    Lumbar stenosis with neurogenic claudication    Spinal deformity  Resolved Problems:    * No resolved hospital problems. *      Admission Condition:  stable     Discharged Condition: stable    Hospital Stay:     Hospital Course:  Marion Johnson is a 62 y.o. female who was admitted for the management of progressive weakness. The patient was referred from the neurology clinic with complaints of gait imbalance, worsening limb weakness along with arm weakness and trouble lifting. She reported feeling increased generalized weakness and stiffness along with difficulty controlling her bladder and bowels for the past few months. She also reported numbness and tingling into the left arm. There are no acute changes in condition but progressive decline of chronic issues. She has a history of moderate to severe back pain follows with pain management; she currently takes hydrocodone 3 times daily and methadone twice daily. Neurosurgery was consulted; MRI cervical spine with myelomalacia of a prior decompressed region as well as spinal deformity with some hyperkyphosis with swan-neck deformity. Neurosurgery planning to follow in the outpatient office to further discuss surgery given her spinal deformity.  Patient was seen by PT/OT and further therapy was recommended however patient desires to go home at this time with support from her  and will follow up in the outpatient clinic with neurosurgery. Significant therapeutic interventions: Evaluation by neurosurgery. Significant Diagnostic Studies:   Labs / Micro:  CBC:   Lab Results   Component Value Date    WBC 4.6 11/21/2021    RBC 3.89 11/21/2021    HGB 12.3 11/21/2021    HCT 38.7 11/21/2021    MCV 99.5 11/21/2021    MCH 31.6 11/21/2021    MCHC 31.8 11/21/2021    RDW 12.5 11/21/2021     11/21/2021     BMP:    Lab Results   Component Value Date    GLUCOSE 93 11/22/2021     11/22/2021    K 4.3 11/22/2021     11/22/2021    CO2 25 11/22/2021    ANIONGAP 10 11/22/2021    BUN 13 11/22/2021    CREATININE 0.87 11/22/2021    BUNCRER NOT REPORTED 11/22/2021    CALCIUM 9.2 11/22/2021    LABGLOM >60 11/22/2021    GFRAA >60 11/22/2021    GFR      11/22/2021    GFR NOT REPORTED 11/22/2021         Radiology:    MRI CERVICAL SPINE WO CONTRAST    Result Date: 11/20/2021  EXAMINATION: MRI OF THE CERVICAL SPINE WITHOUT CONTRAST 11/20/2021 5:16 pm TECHNIQUE: Multiplanar multisequence MRI of the cervical spine was performed without the administration of intravenous contrast. COMPARISON: None. HISTORY: ORDERING SYSTEM PROVIDED HISTORY: gait imbalance and limb weakness TECHNOLOGIST PROVIDED HISTORY: gait imbalance and limb weakness Reason for Exam: gait imbalance and limb weakness FINDINGS: BONES/ALIGNMENT: There is some reversal the cervical lordosis inferiorly, most pronounced at C5-6. Minimal anterolisthesis at C3-4 and C4-5. Vertebral body heights are maintained. Degenerative marrow signal changes are noted at multiple levels. SPINAL CORD: There is faint T2 hyperintensity within the cervical cord at C4-5. There is also T2 hyperintensity within the cervical cord, predominantly on the left, at C6. SOFT TISSUES: No paraspinal mass identified. C2-C3: There is no significant disc protrusion, spinal canal stenosis or neural foraminal narrowing. C3-C4: Posterior disc osteophyte complex with moderate to severe spinal canal stenosis. Uncovertebral facet DJD with moderate to severe right and severe left neural foraminal stenosis. C4-C5: Posterior disc osteophyte complex with moderate spinal canal stenosis. Uncovertebral facet DJD with moderate to severe bilateral neural foraminal stenosis. C5-C6: Posterior disc osteophyte complex with mild-to-moderate spinal canal stenosis. Uncovertebral facet DJD with moderate to severe bilateral neural foraminal stenosis. C6-C7: Posterior disc osteophyte complex without significant spinal canal stenosis. Uncovertebral facet DJD with moderate bilateral neural foraminal stenosis. C7-T1: Posterior disc osteophyte complex with mild-to-moderate spinal canal stenosis. Uncovertebral facet DJD with moderate right and mild left neural foraminal stenosis. 1. Multilevel degenerative changes in the cervical spine with moderate to severe spinal canal stenosis at C3-4, moderate spinal canal stenosis at C4-5, and mild to moderate spinal canal stenosis at C5-6 and C7-T1. 2. Multilevel neural foraminal narrowing as above. 3. Areas of hyperintensity in the cord at C4-5 and C6, likely myelomalacia. 4. Grade 1 anterolisthesis at C3-4 and C4-5. MRI THORACIC SPINE WO CONTRAST    Result Date: 11/20/2021  EXAMINATION: MRI OF THE THORACIC SPINE WITHOUT CONTRAST  11/20/2021 5:16 pm TECHNIQUE: Multiplanar multisequence MRI of the thoracic spine was performed without the administration of intravenous contrast. COMPARISON: None. HISTORY: ORDERING SYSTEM PROVIDED HISTORY: gait imbalance and limb weakness TECHNOLOGIST PROVIDED HISTORY: gait imbalance and limb weakness Reason for Exam: gait imbalance and limb weakness FINDINGS: BONES/ALIGNMENT: There is mild chronic anterior T3 vertebral body height loss. 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 Vertebral body heights are otherwise maintained.   There is mild smooth thoracic dextroscoliosis and 2 mm degenerative anterolisthesis of T2 on T3 secondary to disc height loss and facet arthropathy. No additional spondylolisthesis is evident. Marrow signal is within normal limits for age. SPINAL CORD: The visualized spinal cord has normal signal and morphology. No evidence of mass or abnormal fluid collection within the spinal canal. SOFT TISSUES: Paraspinal soft tissues are unremarkable. DEGENERATIVE CHANGES: Mild spinal canal stenosis at T5-6, T6-7, and T7-8 secondary to posterior disc protrusions. Mild spinal canal stenosis at T12-L1 secondary to disc bulge and posterior disc protrusion. No significant neural foraminal narrowing. 1. No acute abnormality of the thoracic spine or finding to suggest etiology of patient's symptoms. 2. Mild multilevel spinal canal stenosis. 3. Mild smooth thoracic dextroscoliosis and 2 mm degenerative anterolisthesis of T2 on T3. MRI LUMBAR SPINE WO CONTRAST    Result Date: 11/20/2021  EXAMINATION: MRI OF THE LUMBAR SPINE WITHOUT CONTRAST, 11/20/2021 5:16 pm TECHNIQUE: Multiplanar multisequence MRI of the lumbar spine was performed without the administration of intravenous contrast. COMPARISON: None available HISTORY: ORDERING SYSTEM PROVIDED HISTORY: gait imbalance and limb weakness TECHNOLOGIST PROVIDED HISTORY: gait imbalance and limb weakness Reason for Exam: gait imbalance and limb weakness FINDINGS: BONES/ALIGNMENT: There is levocurvature in the lumbar spine. Minimal anterolisthesis at L3-4. Vertebral body heights are maintained. Degenerative marrow signal change absence of multiple levels. There is minimal edema/fluid in the intervertebral disc space at L4-5. SPINAL CORD: The conus terminates normally. SOFT TISSUES: No paraspinal mass identified. L1-L2: Disc extrusion, facet DJD, and ligamentum flavum hypertrophy with moderate spinal canal stenosis. Mild bilateral neural foraminal stenosis. L2-L3: Disc extrusion, facet DJD, and ligamentum flavum hypertrophy with severe spinal canal stenosis and effacement of both lateral recesses. Mild bilateral neural foraminal stenosis. L3-L4: Disc extrusion, facet DJD, and ligamentum flavum hypertrophy with severe spinal canal stenosis and advanced narrowing of both lateral recesses. Mild-to-moderate right neural foraminal stenosis without significant left neural foraminal narrowing. L4-L5: Disc extrusion, facet DJD, and ligamentum flavum hypertrophy with severe spinal canal stenosis and advanced narrowing of the left lateral recess. Severe left and mild right neural foraminal stenosis. L5-S1: Disc protrusion and facet DJD with out significant spinal canal stenosis or neural foraminal stenosis. 1. Advanced multilevel degenerative changes with severe spinal canal stenosis at L2-3, L3-4, and L4-5. There is moderate spinal canal narrowing at L1-2. 2. Advanced multilevel lateral recess and neural foraminal stenosis as above. 3. Levoscoliosis with anterolisthesis at L3-4. 4. There is mild endplate edema at C2-7 with fluid in the intervertebral disc space. This is likely degenerative with Modic type 1 marrow change. Correlation for any signs or symptoms of infection is recommended. Consultations:    Consults:     Final Specialist Recommendations/Findings:   IP CONSULT TO PHYSICAL MEDICINE REHAB  IP CONSULT TO NEUROSURGERY      The patient was seen and examined on day of discharge and this discharge summary is in conjunction with any daily progress note from day of discharge. Discharge plan:     Disposition: Home    Physician Follow Up:     Torsten Fernandez [address]  [phone]    In 3 weeks  Neurosurgery please have patient follow up in 2-4 weeks     Stephanie Hinkle, [address]  [phone]    Schedule an appointment as soon as possible for a visit         Requiring Further Evaluation/Follow Up POST HOSPITALIZATION/Incidental Findings:  Follow up with neurosurgery in next 2-3 weeks.    Diet: regular diet    Activity: As tolerated; use walker when ambulating. Patient declines home PT/OT. Instructions to Patient: Keep all follow up appointments. Discharge Medications:      Medication List      CONTINUE taking these medications    busPIRone 30 MG tablet  Commonly known as: BUSPAR  Take 30 mg by mouth daily     dilTIAZem 120 MG extended release capsule  Commonly known as: DILACOR XR  TAKE ONE CAPSULE BY MOUTH DAILY     HYDROcodone-acetaminophen 7.5-325 MG per tablet  Commonly known as: NORCO     levothyroxine 88 MCG tablet  Commonly known as: SYNTHROID  Take 1 tablet by mouth Daily     methadone 10 MG tablet  Commonly known as: DOLOPHINE     pantoprazole 40 MG tablet  Commonly known as: PROTONIX  Take 1 tablet by mouth daily     Potassium Gluconate 595 (99 K) MG Tabs  Take 1 tablet by mouth daily     tiZANidine 4 MG tablet  Commonly known as: ZANAFLEX     Vitamin D (Ergocalciferol) 50 MCG (2000 UT) Caps  TAKE ONE CAPSULE BY MOUTH DAILY            Time Spent on discharge is  31 mins in patient examination, evaluation, counseling as well as medication reconciliation, prescriptions for required medications, discharge plan and follow up. Electronically signed by   CASSANDRA Molina CNP  11/22/2021  1:40 PM      Thank you Dr. Tian primary care provider on file. for the opportunity to be involved in this patient's care.

## 2021-12-17 ENCOUNTER — OFFICE VISIT (OUTPATIENT)
Dept: PRIMARY CARE CLINIC | Age: 61
End: 2021-12-17
Payer: MEDICARE

## 2021-12-17 VITALS
BODY MASS INDEX: 26.47 KG/M2 | HEART RATE: 106 BPM | OXYGEN SATURATION: 98 % | DIASTOLIC BLOOD PRESSURE: 90 MMHG | SYSTOLIC BLOOD PRESSURE: 170 MMHG | HEIGHT: 61 IN | WEIGHT: 140.2 LBS

## 2021-12-17 DIAGNOSIS — F41.8 ANXIETY ABOUT HEALTH: ICD-10-CM

## 2021-12-17 DIAGNOSIS — R27.0 ATAXIA: ICD-10-CM

## 2021-12-17 DIAGNOSIS — E03.9 HYPOTHYROIDISM, UNSPECIFIED TYPE: ICD-10-CM

## 2021-12-17 DIAGNOSIS — I10 ESSENTIAL HYPERTENSION: Primary | ICD-10-CM

## 2021-12-17 PROBLEM — R45.89 ANXIETY ABOUT HEALTH: Status: ACTIVE | Noted: 2021-12-17

## 2021-12-17 PROCEDURE — G8419 CALC BMI OUT NRM PARAM NOF/U: HCPCS | Performed by: FAMILY MEDICINE

## 2021-12-17 PROCEDURE — 1036F TOBACCO NON-USER: CPT | Performed by: FAMILY MEDICINE

## 2021-12-17 PROCEDURE — G8427 DOCREV CUR MEDS BY ELIG CLIN: HCPCS | Performed by: FAMILY MEDICINE

## 2021-12-17 PROCEDURE — 3017F COLORECTAL CA SCREEN DOC REV: CPT | Performed by: FAMILY MEDICINE

## 2021-12-17 PROCEDURE — 99214 OFFICE O/P EST MOD 30 MIN: CPT | Performed by: FAMILY MEDICINE

## 2021-12-17 PROCEDURE — 1111F DSCHRG MED/CURRENT MED MERGE: CPT | Performed by: FAMILY MEDICINE

## 2021-12-17 PROCEDURE — G8482 FLU IMMUNIZE ORDER/ADMIN: HCPCS | Performed by: FAMILY MEDICINE

## 2021-12-17 RX ORDER — DILTIAZEM HYDROCHLORIDE 180 MG/1
180 CAPSULE, EXTENDED RELEASE ORAL DAILY
Qty: 30 CAPSULE | Refills: 2 | Status: SHIPPED | OUTPATIENT
Start: 2021-12-17 | End: 2022-04-01

## 2021-12-17 RX ORDER — LEVOTHYROXINE SODIUM 88 UG/1
88 TABLET ORAL DAILY
Qty: 90 TABLET | Refills: 2 | Status: ON HOLD
Start: 2021-12-17 | End: 2022-04-28 | Stop reason: HOSPADM

## 2021-12-17 RX ORDER — BUSPIRONE HYDROCHLORIDE 30 MG/1
30 TABLET ORAL 2 TIMES DAILY
Qty: 60 TABLET | Refills: 3 | Status: SHIPPED | OUTPATIENT
Start: 2021-12-17 | End: 2022-06-17

## 2021-12-17 ASSESSMENT — ENCOUNTER SYMPTOMS: RESPIRATORY NEGATIVE: 1

## 2021-12-17 NOTE — PROGRESS NOTES
[address] [facility] PRIMARY CARE  [address]  [facility] [address]  Dept: [address] is a 62 y.o. female Established patient, who presents today for her medical conditions/complaintsas noted below. Chief Complaint   Patient presents with    Other     f/u to recheck nerve issue       HPI:     HPI    Reviewed prior notes None  Reviewed previous Labs   anxiety is worse due to unable to walk well, has to wear pull ups since she can't get to the BR in time. She is frustrated with her lack of abiliity do do things, can't walk far even with her walker. She is also scared because she was told her neck is not good and she could become paralyzed if she fell or was in an accident.      Has been drinking beers on the weekend to relax    LDL Calculated (mg/dL)   Date Value   07/23/2020 75   10/19/2018 71       (goal LDL is <100)   BUN (mg/dL)   Date Value   11/22/2021 13     BP Readings from Last 3 Encounters:   12/17/21 (!) 170/90   11/22/21 (!) 155/83   11/19/21 (!) 170/96          (goal 120/80)    Past Medical History:   Diagnosis Date    Arthritis     Cancer ([address]     right breast    GERD (gastroesophageal reflux disease)     History of stomach ulcers     Hypertension     Hypothyroidism     Lumbar neuritis 9/8/2016    Nausea & vomiting     Post laminectomy syndrome 9/8/2016    Stress incontinence     Thyroid disease       Past Surgical History:   Procedure Laterality Date    BACK SURGERY      x 2    BREAST SURGERY Right     mastectomy with reconstruction    CERVICAL LAMINECTOMY  11/04/2014    cervicle laminectomy c4-c6    HERNIA REPAIR Bilateral     inguinal    HYSTERECTOMY, TOTAL ABDOMINAL      MASTECTOMY, RADICAL         Family History   Problem Relation Age of Onset    Hypertension Mother     Heart Disease Mother     Cancer Mother        Social History     Tobacco Use    Smoking status: Former Smoker     Packs/day: 0.50     Years: 30.00     Pack years: 15.00     Quit date:      Years since quittin.9    Smokeless tobacco: Never Used   Substance Use Topics    Alcohol use: Yes     Alcohol/week: 0.0 standard drinks     Comment: 3 whiskey drinks per day      Current Outpatient Medications   Medication Sig Dispense Refill    busPIRone (BUSPAR) 30 MG tablet Take 30 mg by mouth 2 times daily 60 tablet 3    levothyroxine (SYNTHROID) 88 MCG tablet Take 1 tablet by mouth Daily 90 tablet 2    dilTIAZem (DILACOR XR) 180 MG extended release capsule Take 1 capsule by mouth daily 30 capsule 2    tiZANidine (ZANAFLEX) 4 MG tablet Take 4 mg by mouth every 12 hours      Potassium Gluconate 595 (99 K) MG TABS Take 1 tablet by mouth daily 90 tablet 0    pantoprazole (PROTONIX) 40 MG tablet Take 1 tablet by mouth daily 90 tablet 0    Vitamin D, Ergocalciferol, 50 MCG (2000 UT) CAPS TAKE ONE CAPSULE BY MOUTH DAILY 90 capsule 3    HYDROcodone-acetaminophen (NORCO) 7.5-325 MG per tablet Up to three tablets a day.  methadone (DOLOPHINE) 10 MG tablet Take 10 mg by mouth 2 times daily. No current facility-administered medications for this visit.      Allergies   Allergen Reactions    Latex Hives, Itching, Dermatitis and Rash    Kiwi Extract      Other reaction(s): Facial Swelling       Health Maintenance   Topic Date Due    Shingles Vaccine (1 of 2) Never done    Breast cancer screen  2021    Annual Wellness Visit (AWV)  10/29/2021    TSH testing  2022    Potassium monitoring  2022    Creatinine monitoring  2022    Lipid screen  2025    Colon cancer screen colonoscopy  2028    DTaP/Tdap/Td vaccine (2 - Td or Tdap) 2029    Flu vaccine  Completed    COVID-19 Vaccine  Completed    Hepatitis C screen  Completed    HIV screen  Completed    Hepatitis A vaccine  Aged Out    Hepatitis B vaccine  Aged Out    Hib vaccine  Aged Out    Meningococcal (ACWY) vaccine  Aged Out    Pneumococcal 0-64 years Vaccine  Aged Out       Subjective:      Review of Systems   Constitutional: Negative. Respiratory: Negative. Cardiovascular: Negative. Objective:     BP (!) 170/90 (Site: Left Upper Arm, Position: Sitting, Cuff Size: Medium Adult)   Pulse 106   Ht 5' 1\" (1.549 m)   Wt 140 lb 3.2 oz (63.6 kg)   SpO2 98%   BMI 26.49 kg/m²   Physical Exam  Vitals and nursing note reviewed. Constitutional:       General: She is not in acute distress. Appearance: She is well-developed. She is not ill-appearing. HENT:      Head: Normocephalic and atraumatic. Right Ear: External ear normal.      Left Ear: External ear normal.   Eyes:      General: No scleral icterus. Right eye: No discharge. Left eye: No discharge. Conjunctiva/sclera: Conjunctivae normal.   Neck:      Thyroid: No thyromegaly. Trachea: No tracheal deviation. Cardiovascular:      Rate and Rhythm: Normal rate and regular rhythm. Heart sounds: Normal heart sounds. Pulmonary:      Effort: Pulmonary effort is normal. No respiratory distress. Breath sounds: Normal breath sounds. No wheezing. Lymphadenopathy:      Cervical: No cervical adenopathy. Skin:     General: Skin is warm. Findings: No rash. Neurological:      Mental Status: She is alert and oriented to person, place, and time. Psychiatric:         Mood and Affect: Mood normal.         Behavior: Behavior normal.         Thought Content: Thought content normal.         Assessment:       Diagnosis Orders   1. Essential hypertension  dilTIAZem (DILACOR XR) 180 MG extended release capsule   2. Hypothyroidism, unspecified type  levothyroxine (SYNTHROID) 88 MCG tablet   3. Ataxia     4. Anxiety about health          Plan:      Return in about 3 months (around 3/17/2022) for hypertension. consider counseling for stress  Increase buspar  Increase cardizem dose for BP and pulse. She must stop drinking any  beer or alcohol at all.  It can damage muscles, interact with meds. She should not be taking buspar on days she is drinking. No orders of the defined types were placed in this encounter. Orders Placed This Encounter   Medications    busPIRone (BUSPAR) 30 MG tablet     Sig: Take 30 mg by mouth 2 times daily     Dispense:  60 tablet     Refill:  3    levothyroxine (SYNTHROID) 88 MCG tablet     Sig: Take 1 tablet by mouth Daily     Dispense:  90 tablet     Refill:  2    dilTIAZem (DILACOR XR) 180 MG extended release capsule     Sig: Take 1 capsule by mouth daily     Dispense:  30 capsule     Refill:  2       Patient given educationalmaterials - see patient instructions. Discussed use, benefit, and side effectsof prescribed medications. All patient questions answered. Pt voiced understanding. Reviewed health maintenance. Instructed to continue current medications, diet. Patient agreed with treatment plan. Follow up as directed.      Electronicallysigned by Kiko Mistry MD on 12/17/2021 at 11:02 AM

## 2021-12-29 ENCOUNTER — OFFICE VISIT (OUTPATIENT)
Dept: NEUROSURGERY | Age: 61
End: 2021-12-29
Payer: MEDICARE

## 2021-12-29 VITALS
WEIGHT: 140 LBS | BODY MASS INDEX: 26.43 KG/M2 | SYSTOLIC BLOOD PRESSURE: 145 MMHG | DIASTOLIC BLOOD PRESSURE: 84 MMHG | HEART RATE: 72 BPM | OXYGEN SATURATION: 100 % | HEIGHT: 61 IN

## 2021-12-29 DIAGNOSIS — G95.9 CERVICAL MYELOPATHY (HCC): Primary | ICD-10-CM

## 2021-12-29 DIAGNOSIS — Q76.49 SPINAL DEFORMITY: ICD-10-CM

## 2021-12-29 DIAGNOSIS — G83.4 CAUDA EQUINA SYNDROME (HCC): ICD-10-CM

## 2021-12-29 PROCEDURE — 3017F COLORECTAL CA SCREEN DOC REV: CPT | Performed by: NEUROLOGICAL SURGERY

## 2021-12-29 PROCEDURE — G8427 DOCREV CUR MEDS BY ELIG CLIN: HCPCS | Performed by: NEUROLOGICAL SURGERY

## 2021-12-29 PROCEDURE — 1036F TOBACCO NON-USER: CPT | Performed by: NEUROLOGICAL SURGERY

## 2021-12-29 PROCEDURE — G8419 CALC BMI OUT NRM PARAM NOF/U: HCPCS | Performed by: NEUROLOGICAL SURGERY

## 2021-12-29 PROCEDURE — G8482 FLU IMMUNIZE ORDER/ADMIN: HCPCS | Performed by: NEUROLOGICAL SURGERY

## 2021-12-29 PROCEDURE — 99215 OFFICE O/P EST HI 40 MIN: CPT | Performed by: NEUROLOGICAL SURGERY

## 2021-12-29 NOTE — Clinical Note
Surgery -- needs direct admitted or electively added on for surgery tomorrow to follow    L2-S1 laminectomy

## 2021-12-29 NOTE — PROGRESS NOTES
[place] [id number] [address], [address] [facility] [id number]  Dept: [phone]    Patient:  Lisa Mcdonald  YOB: 1960  Date: 12/29/21    The patient is a 62 y.o. female who presents today for consult of the following problems:     Chief Complaint   Patient presents with    Follow-up     ED follow up . MRI: Mild multilevel spinal canal stenosis. HPI:     Lisa Mcdonald is a 62 y.o. female on whom neurosurgical consultation was requested by Kiko Mistry MD for management of multiple pathologies including cervical stenosis and myelomalacia with myelopathy along with cervical spinal deformity, lumbar spinal deformity and lumbar stenosis with claudication. The patient was recently seen by myself in the hospital with a progressive decline over the course of the last 3 months. Accompanied the office by her partner who states that he has noticed a distinct decline in her mobility over the course of the last few months where she can barely even ambulate with a walker as of current. Is approximately 6 months ago she states that she was able to ambulate most places and only utilize the walker as needed a couple times per week and was fairly independent. Over the course of those last few months she is noted distinctly that she has had a myriad of progressive symptoms. Notably she has stiffness of her lower extremities very significant ataxia and unsteadiness where she can barely stand and also has significant difficulty with her left greater than her right upper extremity. She notices distinct weakness as well as dexterity issues with her left arm greater than the right arm and significant difficulties with her ADLs as well as grasp. She states she has significant chronic numbness of her legs that has been progressive as well. She has more or less been bound to wheelchair at this point.   Denies any incontinence but states that she does wear depends mainly because she can feel the sensation but is unable to get there in time because of her poor mobility. .  Having saddle anesthesia with perineal numbness and genital numbness progressive over last 6wks. History:     Past Medical History:   Diagnosis Date    Arthritis     Cancer ([address]     right breast    GERD (gastroesophageal reflux disease)     History of stomach ulcers     Hypertension     Hypothyroidism     Lumbar neuritis 9/8/2016    Nausea & vomiting     Post laminectomy syndrome 9/8/2016    Stress incontinence     Thyroid disease      Past Surgical History:   Procedure Laterality Date    BACK SURGERY      x 2    BREAST SURGERY Right     mastectomy with reconstruction    CERVICAL LAMINECTOMY  11/04/2014    cervicle laminectomy c4-c6    HERNIA REPAIR Bilateral     inguinal    HYSTERECTOMY, TOTAL ABDOMINAL      MASTECTOMY, RADICAL       Family History   Problem Relation Age of Onset    Hypertension Mother     Heart Disease Mother     Cancer Mother      Current Outpatient Medications on File Prior to Visit   Medication Sig Dispense Refill    pantoprazole (PROTONIX) 40 MG tablet TAKE ONE TABLET BY MOUTH DAILY 90 tablet 1    busPIRone (BUSPAR) 30 MG tablet Take 30 mg by mouth 2 times daily 60 tablet 3    levothyroxine (SYNTHROID) 88 MCG tablet Take 1 tablet by mouth Daily 90 tablet 2    dilTIAZem (DILACOR XR) 180 MG extended release capsule Take 1 capsule by mouth daily 30 capsule 2    tiZANidine (ZANAFLEX) 4 MG tablet Take 4 mg by mouth every 12 hours      Potassium Gluconate 595 (99 K) MG TABS Take 1 tablet by mouth daily 90 tablet 0    Vitamin D, Ergocalciferol, 50 MCG (2000 UT) CAPS TAKE ONE CAPSULE BY MOUTH DAILY 90 capsule 3    HYDROcodone-acetaminophen (NORCO) 7.5-325 MG per tablet Up to three tablets a day.  methadone (DOLOPHINE) 10 MG tablet Take 10 mg by mouth 2 times daily.        No current facility-administered medications on file prior to visit. Social History     Tobacco Use    Smoking status: Former Smoker     Packs/day: 0.50     Years: 30.00     Pack years: 15.00     Quit date:      Years since quittin.9    Smokeless tobacco: Never Used   Vaping Use    Vaping Use: Every day    Substances: Nicotine   Substance Use Topics    Alcohol use: Yes     Alcohol/week: 0.0 standard drinks     Comment: 3 whiskey drinks per day    Drug use: No       Allergies   Allergen Reactions    Latex Hives, Itching, Dermatitis and Rash    Kiwi Extract      Other reaction(s): Facial Swelling       Review of Systems  ROS: no fevers chills n/v    Physical Exam:      BP (!) 145/84   Pulse 72   Ht 5' 1\" (1.549 m)   Wt 140 lb (63.5 kg)   SpO2 100%   BMI 26.45 kg/m²   Estimated body mass index is 26.45 kg/m² as calculated from the following:    Height as of this encounter: 5' 1\" (1.549 m). Weight as of this encounter: 140 lb (63.5 kg). General:  Mayra Corley is a 62y.o. year old female who appears her stated age. HEENT: Normocephalic atraumatic. Neck supple. Chest: regular rate; pulses equal. Equal chest rise and fall  Abdomen: Soft nondistended. Ext: DP equal with good capillary refill  Neuro    Mentation  Appropriate affect   oriented    Cranial Nerves:   Pupils equal and reactive to light  Extraocular motion intact  Face symmetric  No dysarthria  v1-3 sensation symmetric, masseter tone symmetric  Hearing symmetric and intact to finger rub    Sensation:   Left and loss of sensation of bilateral lower extremities nondermatomal.  Numbness in the left upper extremity greater than the right as well. Motor  L deltoid 5/5; R deltoid 5/5  L biceps 5/5; R biceps 5/5  L triceps 5/5; R triceps 5/5  L wrist extension 5/5; R wrist extension 5/5  L intrinsics 5/5; R intrinsics 5/5     0 out of 5 eversion. 2 out of 5 right dorsiflexion 1 out of 5 left dorsiflexion. 4+ plantarflexion. EHL 4 -. Quadriceps 4+.   Iliopsoas 5 out of 5. Reflexes  Diffuse hyperreflexia throughout 4 biceps triceps brachioradialis and patellar's. No clonus. Positive Brittany's bilaterally. Studies Review:     MRI the cervical spine with evident myelomalacia as well as kyphotic deformity and stenosis at the more cephalad levels. Lumbar MRI reveals diffuse multilevel stenosis from L2 all the way to S1. MRI thoracic unremarkable. Assessment and Plan:      1. Cervical myelopathy (HCC)    2. Spinal deformity    3. Cauda equina syndrome ([address]          Plan: I had extensive discussion regarding the findings on imaging as well as the patient's clinical presentation. She has 2 very complex pathologies. Regarding the cervical spine she has an area of myelomalacia likely that is chronic and correlates with a lot of her long track signs and findings. However clinically she has declined more recently which is not explained by chronic myelomalacia. My suspicion is that she has developed chronic cervical instability with more cephalad level stenosis due to the fact that she was decompressed without fixation fusion creating a kyphotic deformity with instability. This will require correction of the deformity in addition to fixation fusion and decompression of the more cephalad levels. This will likely be a procedure involving anterior corpectomy to correct the kyphosis followed by posterior fixation from C2 down to T1. I explained the patient that this is an extensive surgery that will likely be either 1 or 2 days and can take upwards of 6 to 7 hours. She will also lose a significant amount of cervical range of motion with this. Regarding her lumbar spine she has critical stenosis at multiple levels as well as findings on examination and history that correlate with cauda equina syndrome including significant saddle anesthesia and intermittent incontinence as well as severe progressive paraparesis and loss of sensation of the lower extremities. Based on these findings and the MRI findings on lumbar MRI I do believe that this is the most emergent pathology that correlates with her findings. Unfortunately regarding her lumbar MRI she also has a significant amount of spinal deformity both in the coronal and sagittal plane. I did explain to her that this will have to be addressed in order to effectively treat her lumbar spine as just decompression will cause the deformity to progress and ultimately she will require surgery sooner rather than later. However I believe that the lumbar spinal deformity is not a major factor at this point and it is not as urgent as just decompressing. Considering the progression of symptoms and the ominous findings on history and examination that have progressed over the span of the last 2 months I have recommended that we proceed with urgent decompression of her lumbar spine. I will attempt to do this as early as tomorrow and then when she has been able to recover from this then reaccess reassess whether or not she needs more urgent cervical decompression versus lumbar spine deformity correction. In addition she is still pending an MRI of the brain as well as flexion-extension x-rays of the cervical spine and lumbar spine. We can obtain this work-up and at completion while she is an inpatient. Followup: No follow-ups on file. Prescriptions Ordered:  No orders of the defined types were placed in this encounter. Orders Placed:  No orders of the defined types were placed in this encounter. Electronically signed by Gayatri Topete DO on 12/29/2021 at 12:28 PM    Please note that this chart was generated using voice recognition Dragon dictation software. Although every effort was made to ensure the accuracy of this automated transcription, some errors in transcription may have occurred.

## 2021-12-30 ENCOUNTER — HOSPITAL ENCOUNTER (INPATIENT)
Age: 61
LOS: 5 days | Discharge: NURSING, REHAB OR LONG-TERM CARE | DRG: 029 | End: 2022-01-04
Attending: NEUROLOGICAL SURGERY | Admitting: NEUROLOGICAL SURGERY
Payer: MEDICARE

## 2021-12-30 ENCOUNTER — ANESTHESIA (OUTPATIENT)
Dept: OPERATING ROOM | Age: 61
DRG: 029 | End: 2021-12-30
Payer: MEDICARE

## 2021-12-30 ENCOUNTER — ANESTHESIA EVENT (OUTPATIENT)
Dept: OPERATING ROOM | Age: 61
DRG: 029 | End: 2021-12-30
Payer: MEDICARE

## 2021-12-30 ENCOUNTER — APPOINTMENT (OUTPATIENT)
Dept: GENERAL RADIOLOGY | Age: 61
DRG: 029 | End: 2021-12-30
Attending: NEUROLOGICAL SURGERY
Payer: MEDICARE

## 2021-12-30 VITALS — DIASTOLIC BLOOD PRESSURE: 90 MMHG | OXYGEN SATURATION: 100 % | TEMPERATURE: 99.8 F | SYSTOLIC BLOOD PRESSURE: 155 MMHG

## 2021-12-30 DIAGNOSIS — G83.4 CAUDA EQUINA COMPRESSION (HCC): Primary | ICD-10-CM

## 2021-12-30 LAB
ABO/RH: NORMAL
ANTIBODY SCREEN: NEGATIVE
ARM BAND NUMBER: NORMAL
EKG ATRIAL RATE: 83 BPM
EKG P AXIS: 52 DEGREES
EKG P-R INTERVAL: 154 MS
EKG Q-T INTERVAL: 396 MS
EKG QRS DURATION: 78 MS
EKG QTC CALCULATION (BAZETT): 465 MS
EKG R AXIS: 30 DEGREES
EKG T AXIS: 17 DEGREES
EKG VENTRICULAR RATE: 83 BPM
EXPIRATION DATE: NORMAL
GFR NON-AFRICAN AMERICAN: >60 ML/MIN
GFR SERPL CREATININE-BSD FRML MDRD: >60 ML/MIN
GFR SERPL CREATININE-BSD FRML MDRD: NORMAL ML/MIN/{1.73_M2}
GLUCOSE BLD-MCNC: 93 MG/DL (ref 74–100)
POC BUN: 10 MG/DL (ref 8–26)
POC CREATININE: 0.79 MG/DL (ref 0.51–1.19)
POC POTASSIUM: 4.6 MMOL/L (ref 3.5–4.5)

## 2021-12-30 PROCEDURE — 3700000000 HC ANESTHESIA ATTENDED CARE: Performed by: NEUROLOGICAL SURGERY

## 2021-12-30 PROCEDURE — 6370000000 HC RX 637 (ALT 250 FOR IP): Performed by: NEUROLOGICAL SURGERY

## 2021-12-30 PROCEDURE — 2580000003 HC RX 258: Performed by: NEUROLOGICAL SURGERY

## 2021-12-30 PROCEDURE — 2580000003 HC RX 258: Performed by: ANESTHESIOLOGY

## 2021-12-30 PROCEDURE — 3700000001 HC ADD 15 MINUTES (ANESTHESIA): Performed by: NEUROLOGICAL SURGERY

## 2021-12-30 PROCEDURE — 2500000003 HC RX 250 WO HCPCS

## 2021-12-30 PROCEDURE — 3209999900 FLUORO FOR SURGICAL PROCEDURES

## 2021-12-30 PROCEDURE — 84132 ASSAY OF SERUM POTASSIUM: CPT

## 2021-12-30 PROCEDURE — 2709999900 HC NON-CHARGEABLE SUPPLY: Performed by: NEUROLOGICAL SURGERY

## 2021-12-30 PROCEDURE — 6360000002 HC RX W HCPCS: Performed by: ANESTHESIOLOGY

## 2021-12-30 PROCEDURE — 63017 REMOVE SPINE LAMINA >2 LMBR: CPT | Performed by: NEUROLOGICAL SURGERY

## 2021-12-30 PROCEDURE — 6360000002 HC RX W HCPCS: Performed by: STUDENT IN AN ORGANIZED HEALTH CARE EDUCATION/TRAINING PROGRAM

## 2021-12-30 PROCEDURE — 7100000000 HC PACU RECOVERY - FIRST 15 MIN: Performed by: NEUROLOGICAL SURGERY

## 2021-12-30 PROCEDURE — 86901 BLOOD TYPING SEROLOGIC RH(D): CPT

## 2021-12-30 PROCEDURE — 1200000000 HC SEMI PRIVATE

## 2021-12-30 PROCEDURE — 6360000002 HC RX W HCPCS

## 2021-12-30 PROCEDURE — 82947 ASSAY GLUCOSE BLOOD QUANT: CPT

## 2021-12-30 PROCEDURE — 3600000004 HC SURGERY LEVEL 4 BASE: Performed by: NEUROLOGICAL SURGERY

## 2021-12-30 PROCEDURE — 93005 ELECTROCARDIOGRAM TRACING: CPT | Performed by: ANESTHESIOLOGY

## 2021-12-30 PROCEDURE — 2580000003 HC RX 258: Performed by: STUDENT IN AN ORGANIZED HEALTH CARE EDUCATION/TRAINING PROGRAM

## 2021-12-30 PROCEDURE — 86850 RBC ANTIBODY SCREEN: CPT

## 2021-12-30 PROCEDURE — C9290 INJ, BUPIVACAINE LIPOSOME: HCPCS | Performed by: NEUROLOGICAL SURGERY

## 2021-12-30 PROCEDURE — 6370000000 HC RX 637 (ALT 250 FOR IP): Performed by: STUDENT IN AN ORGANIZED HEALTH CARE EDUCATION/TRAINING PROGRAM

## 2021-12-30 PROCEDURE — 2500000003 HC RX 250 WO HCPCS: Performed by: NEUROLOGICAL SURGERY

## 2021-12-30 PROCEDURE — 82565 ASSAY OF CREATININE: CPT

## 2021-12-30 PROCEDURE — 3600000014 HC SURGERY LEVEL 4 ADDTL 15MIN: Performed by: NEUROLOGICAL SURGERY

## 2021-12-30 PROCEDURE — 84520 ASSAY OF UREA NITROGEN: CPT

## 2021-12-30 PROCEDURE — 7100000001 HC PACU RECOVERY - ADDTL 15 MIN: Performed by: NEUROLOGICAL SURGERY

## 2021-12-30 PROCEDURE — 2720000010 HC SURG SUPPLY STERILE: Performed by: NEUROLOGICAL SURGERY

## 2021-12-30 PROCEDURE — 86900 BLOOD TYPING SEROLOGIC ABO: CPT

## 2021-12-30 PROCEDURE — 6360000002 HC RX W HCPCS: Performed by: NEUROLOGICAL SURGERY

## 2021-12-30 PROCEDURE — 00NY0ZZ RELEASE LUMBAR SPINAL CORD, OPEN APPROACH: ICD-10-PCS | Performed by: NEUROLOGICAL SURGERY

## 2021-12-30 RX ORDER — FENTANYL CITRATE 50 UG/ML
25 INJECTION, SOLUTION INTRAMUSCULAR; INTRAVENOUS EVERY 5 MIN PRN
Status: DISCONTINUED | OUTPATIENT
Start: 2021-12-30 | End: 2021-12-30

## 2021-12-30 RX ORDER — MORPHINE SULFATE 10 MG/ML
INJECTION, SOLUTION INTRAMUSCULAR; INTRAVENOUS PRN
Status: DISCONTINUED | OUTPATIENT
Start: 2021-12-30 | End: 2021-12-30 | Stop reason: SDUPTHER

## 2021-12-30 RX ORDER — MIDAZOLAM HYDROCHLORIDE 2 MG/2ML
1 INJECTION, SOLUTION INTRAMUSCULAR; INTRAVENOUS EVERY 10 MIN PRN
Status: DISCONTINUED | OUTPATIENT
Start: 2021-12-30 | End: 2021-12-30

## 2021-12-30 RX ORDER — LABETALOL HYDROCHLORIDE 5 MG/ML
5 INJECTION, SOLUTION INTRAVENOUS EVERY 10 MIN PRN
Status: DISCONTINUED | OUTPATIENT
Start: 2021-12-30 | End: 2021-12-30

## 2021-12-30 RX ORDER — FENTANYL CITRATE 50 UG/ML
50 INJECTION, SOLUTION INTRAMUSCULAR; INTRAVENOUS EVERY 5 MIN PRN
Status: DISCONTINUED | OUTPATIENT
Start: 2021-12-30 | End: 2021-12-30

## 2021-12-30 RX ORDER — ONDANSETRON 2 MG/ML
4 INJECTION INTRAMUSCULAR; INTRAVENOUS
Status: DISCONTINUED | OUTPATIENT
Start: 2021-12-30 | End: 2021-12-30

## 2021-12-30 RX ORDER — NEOSTIGMINE METHYLSULFATE 5 MG/5 ML
SYRINGE (ML) INTRAVENOUS PRN
Status: DISCONTINUED | OUTPATIENT
Start: 2021-12-30 | End: 2021-12-30 | Stop reason: SDUPTHER

## 2021-12-30 RX ORDER — LIDOCAINE HYDROCHLORIDE 10 MG/ML
INJECTION, SOLUTION EPIDURAL; INFILTRATION; INTRACAUDAL; PERINEURAL PRN
Status: DISCONTINUED | OUTPATIENT
Start: 2021-12-30 | End: 2021-12-30 | Stop reason: SDUPTHER

## 2021-12-30 RX ORDER — ROCURONIUM BROMIDE 10 MG/ML
INJECTION, SOLUTION INTRAVENOUS PRN
Status: DISCONTINUED | OUTPATIENT
Start: 2021-12-30 | End: 2021-12-30 | Stop reason: SDUPTHER

## 2021-12-30 RX ORDER — SODIUM CHLORIDE, SODIUM LACTATE, POTASSIUM CHLORIDE, CALCIUM CHLORIDE 600; 310; 30; 20 MG/100ML; MG/100ML; MG/100ML; MG/100ML
INJECTION, SOLUTION INTRAVENOUS CONTINUOUS
Status: DISCONTINUED | OUTPATIENT
Start: 2021-12-30 | End: 2021-12-30

## 2021-12-30 RX ORDER — PROMETHAZINE HYDROCHLORIDE 25 MG/ML
6.25 INJECTION, SOLUTION INTRAMUSCULAR; INTRAVENOUS
Status: DISCONTINUED | OUTPATIENT
Start: 2021-12-30 | End: 2021-12-30

## 2021-12-30 RX ORDER — PROPOFOL 10 MG/ML
INJECTION, EMULSION INTRAVENOUS PRN
Status: DISCONTINUED | OUTPATIENT
Start: 2021-12-30 | End: 2021-12-30 | Stop reason: SDUPTHER

## 2021-12-30 RX ORDER — HYDRALAZINE HYDROCHLORIDE 20 MG/ML
5 INJECTION INTRAMUSCULAR; INTRAVENOUS EVERY 10 MIN PRN
Status: DISCONTINUED | OUTPATIENT
Start: 2021-12-30 | End: 2021-12-30

## 2021-12-30 RX ORDER — DIPHENHYDRAMINE HYDROCHLORIDE 50 MG/ML
12.5 INJECTION INTRAMUSCULAR; INTRAVENOUS
Status: DISCONTINUED | OUTPATIENT
Start: 2021-12-30 | End: 2021-12-30

## 2021-12-30 RX ORDER — 0.9 % SODIUM CHLORIDE 0.9 %
500 INTRAVENOUS SOLUTION INTRAVENOUS
Status: DISCONTINUED | OUTPATIENT
Start: 2021-12-30 | End: 2021-12-30

## 2021-12-30 RX ORDER — LABETALOL HYDROCHLORIDE 5 MG/ML
INJECTION, SOLUTION INTRAVENOUS PRN
Status: DISCONTINUED | OUTPATIENT
Start: 2021-12-30 | End: 2021-12-30 | Stop reason: SDUPTHER

## 2021-12-30 RX ORDER — ONDANSETRON 2 MG/ML
4 INJECTION INTRAMUSCULAR; INTRAVENOUS DAILY PRN
Status: DISCONTINUED | OUTPATIENT
Start: 2021-12-30 | End: 2021-12-30

## 2021-12-30 RX ORDER — FENTANYL CITRATE 50 UG/ML
INJECTION, SOLUTION INTRAMUSCULAR; INTRAVENOUS PRN
Status: DISCONTINUED | OUTPATIENT
Start: 2021-12-30 | End: 2021-12-30 | Stop reason: SDUPTHER

## 2021-12-30 RX ORDER — GLYCOPYRROLATE 1 MG/5 ML
SYRINGE (ML) INTRAVENOUS PRN
Status: DISCONTINUED | OUTPATIENT
Start: 2021-12-30 | End: 2021-12-30 | Stop reason: SDUPTHER

## 2021-12-30 RX ORDER — CYCLOBENZAPRINE HCL 10 MG
10 TABLET ORAL 3 TIMES DAILY
Status: DISCONTINUED | OUTPATIENT
Start: 2021-12-30 | End: 2021-12-31

## 2021-12-30 RX ORDER — MAGNESIUM HYDROXIDE 1200 MG/15ML
LIQUID ORAL CONTINUOUS PRN
Status: DISCONTINUED | OUTPATIENT
Start: 2021-12-30 | End: 2021-12-31 | Stop reason: HOSPADM

## 2021-12-30 RX ORDER — OXYCODONE HYDROCHLORIDE AND ACETAMINOPHEN 5; 325 MG/1; MG/1
1 TABLET ORAL EVERY 4 HOURS PRN
Status: DISCONTINUED | OUTPATIENT
Start: 2021-12-30 | End: 2021-12-30

## 2021-12-30 RX ORDER — LIDOCAINE HYDROCHLORIDE 10 MG/ML
1 INJECTION, SOLUTION EPIDURAL; INFILTRATION; INTRACAUDAL; PERINEURAL
Status: DISCONTINUED | OUTPATIENT
Start: 2021-12-30 | End: 2021-12-30

## 2021-12-30 RX ORDER — SCOLOPAMINE TRANSDERMAL SYSTEM 1 MG/1
1 PATCH, EXTENDED RELEASE TRANSDERMAL ONCE
Status: DISCONTINUED | OUTPATIENT
Start: 2021-12-30 | End: 2021-12-30

## 2021-12-30 RX ORDER — LIDOCAINE HYDROCHLORIDE AND EPINEPHRINE 10; 10 MG/ML; UG/ML
INJECTION, SOLUTION INFILTRATION; PERINEURAL PRN
Status: DISCONTINUED | OUTPATIENT
Start: 2021-12-30 | End: 2021-12-31 | Stop reason: HOSPADM

## 2021-12-30 RX ORDER — ONDANSETRON 2 MG/ML
INJECTION INTRAMUSCULAR; INTRAVENOUS PRN
Status: DISCONTINUED | OUTPATIENT
Start: 2021-12-30 | End: 2021-12-30 | Stop reason: SDUPTHER

## 2021-12-30 RX ORDER — DEXAMETHASONE SODIUM PHOSPHATE 10 MG/ML
INJECTION INTRAMUSCULAR; INTRAVENOUS PRN
Status: DISCONTINUED | OUTPATIENT
Start: 2021-12-30 | End: 2021-12-30 | Stop reason: SDUPTHER

## 2021-12-30 RX ORDER — OXYCODONE HYDROCHLORIDE 5 MG/1
5 TABLET ORAL EVERY 4 HOURS PRN
Status: DISCONTINUED | OUTPATIENT
Start: 2021-12-30 | End: 2022-01-04 | Stop reason: HOSPADM

## 2021-12-30 RX ORDER — OXYCODONE HYDROCHLORIDE 5 MG/1
10 TABLET ORAL EVERY 4 HOURS PRN
Status: DISCONTINUED | OUTPATIENT
Start: 2021-12-30 | End: 2022-01-04 | Stop reason: HOSPADM

## 2021-12-30 RX ORDER — MIDAZOLAM HYDROCHLORIDE 1 MG/ML
INJECTION INTRAMUSCULAR; INTRAVENOUS PRN
Status: DISCONTINUED | OUTPATIENT
Start: 2021-12-30 | End: 2021-12-30 | Stop reason: SDUPTHER

## 2021-12-30 RX ADMIN — SODIUM CHLORIDE, POTASSIUM CHLORIDE, SODIUM LACTATE AND CALCIUM CHLORIDE: 600; 310; 30; 20 INJECTION, SOLUTION INTRAVENOUS at 10:08

## 2021-12-30 RX ADMIN — OXYCODONE HYDROCHLORIDE 10 MG: 5 TABLET ORAL at 23:15

## 2021-12-30 RX ADMIN — Medication 3 MG: at 13:49

## 2021-12-30 RX ADMIN — MORPHINE SULFATE 2 MG: 10 INJECTION INTRAVENOUS at 13:29

## 2021-12-30 RX ADMIN — ROCURONIUM BROMIDE 40 MG: 10 INJECTION INTRAVENOUS at 11:13

## 2021-12-30 RX ADMIN — FENTANYL CITRATE 50 MCG: 50 INJECTION, SOLUTION INTRAMUSCULAR; INTRAVENOUS at 11:53

## 2021-12-30 RX ADMIN — ROCURONIUM BROMIDE 10 MG: 10 INJECTION INTRAVENOUS at 12:44

## 2021-12-30 RX ADMIN — FENTANYL CITRATE 50 MCG: 50 INJECTION INTRAMUSCULAR; INTRAVENOUS at 14:53

## 2021-12-30 RX ADMIN — FENTANYL CITRATE 50 MCG: 50 INJECTION INTRAMUSCULAR; INTRAVENOUS at 14:29

## 2021-12-30 RX ADMIN — FENTANYL CITRATE 50 MCG: 50 INJECTION, SOLUTION INTRAMUSCULAR; INTRAVENOUS at 11:32

## 2021-12-30 RX ADMIN — ROCURONIUM BROMIDE 25 MG: 10 INJECTION INTRAVENOUS at 11:52

## 2021-12-30 RX ADMIN — LIDOCAINE HYDROCHLORIDE 50 MG: 10 INJECTION, SOLUTION EPIDURAL; INFILTRATION; INTRACAUDAL; PERINEURAL at 11:13

## 2021-12-30 RX ADMIN — MIDAZOLAM HYDROCHLORIDE 2 MG: 1 INJECTION, SOLUTION INTRAMUSCULAR; INTRAVENOUS at 11:11

## 2021-12-30 RX ADMIN — CYCLOBENZAPRINE 10 MG: 10 TABLET, FILM COATED ORAL at 18:53

## 2021-12-30 RX ADMIN — MORPHINE SULFATE 3 MG: 10 INJECTION INTRAVENOUS at 14:18

## 2021-12-30 RX ADMIN — FENTANYL CITRATE 50 MCG: 50 INJECTION INTRAMUSCULAR; INTRAVENOUS at 14:35

## 2021-12-30 RX ADMIN — ROCURONIUM BROMIDE 10 MG: 10 INJECTION INTRAVENOUS at 11:44

## 2021-12-30 RX ADMIN — MORPHINE SULFATE 2 MG: 10 INJECTION INTRAVENOUS at 13:23

## 2021-12-30 RX ADMIN — Medication 5 MG: at 13:56

## 2021-12-30 RX ADMIN — FENTANYL CITRATE 50 MCG: 50 INJECTION, SOLUTION INTRAMUSCULAR; INTRAVENOUS at 11:49

## 2021-12-30 RX ADMIN — CEFAZOLIN 2000 MG: 10 INJECTION, POWDER, FOR SOLUTION INTRAVENOUS at 11:42

## 2021-12-30 RX ADMIN — FENTANYL CITRATE 100 MCG: 50 INJECTION, SOLUTION INTRAMUSCULAR; INTRAVENOUS at 11:13

## 2021-12-30 RX ADMIN — CEFAZOLIN 2000 MG: 10 INJECTION, POWDER, FOR SOLUTION INTRAVENOUS; PARENTERAL at 18:17

## 2021-12-30 RX ADMIN — DEXAMETHASONE SODIUM PHOSPHATE 10 MG: 10 INJECTION INTRAMUSCULAR; INTRAVENOUS at 11:35

## 2021-12-30 RX ADMIN — MORPHINE SULFATE 1 MG: 10 INJECTION INTRAVENOUS at 14:14

## 2021-12-30 RX ADMIN — Medication 0.6 MG: at 13:49

## 2021-12-30 RX ADMIN — OXYCODONE HYDROCHLORIDE 10 MG: 5 TABLET ORAL at 16:40

## 2021-12-30 RX ADMIN — PROPOFOL 150 MG: 10 INJECTION, EMULSION INTRAVENOUS at 11:13

## 2021-12-30 RX ADMIN — ONDANSETRON 4 MG: 2 INJECTION INTRAMUSCULAR; INTRAVENOUS at 13:53

## 2021-12-30 RX ADMIN — MORPHINE SULFATE 2 MG: 10 INJECTION INTRAVENOUS at 14:13

## 2021-12-30 RX ADMIN — ROCURONIUM BROMIDE 10 MG: 10 INJECTION INTRAVENOUS at 13:04

## 2021-12-30 ASSESSMENT — PULMONARY FUNCTION TESTS
PIF_VALUE: 18
PIF_VALUE: 18
PIF_VALUE: 17
PIF_VALUE: 2
PIF_VALUE: 18
PIF_VALUE: 17
PIF_VALUE: 18
PIF_VALUE: 17
PIF_VALUE: 18
PIF_VALUE: 11
PIF_VALUE: 17
PIF_VALUE: 18
PIF_VALUE: 17
PIF_VALUE: 19
PIF_VALUE: 19
PIF_VALUE: 18
PIF_VALUE: 16
PIF_VALUE: 17
PIF_VALUE: 0
PIF_VALUE: 16
PIF_VALUE: 3
PIF_VALUE: 18
PIF_VALUE: 19
PIF_VALUE: 17
PIF_VALUE: 17
PIF_VALUE: 18
PIF_VALUE: 17
PIF_VALUE: 16
PIF_VALUE: 17
PIF_VALUE: 18
PIF_VALUE: 17
PIF_VALUE: 18
PIF_VALUE: 17
PIF_VALUE: 18
PIF_VALUE: 17
PIF_VALUE: 18
PIF_VALUE: 19
PIF_VALUE: 18
PIF_VALUE: 17
PIF_VALUE: 19
PIF_VALUE: 17
PIF_VALUE: 16
PIF_VALUE: 17
PIF_VALUE: 23
PIF_VALUE: 0
PIF_VALUE: 16
PIF_VALUE: 16
PIF_VALUE: 17
PIF_VALUE: 15
PIF_VALUE: 0
PIF_VALUE: 16
PIF_VALUE: 17
PIF_VALUE: 18
PIF_VALUE: 17
PIF_VALUE: 15
PIF_VALUE: 17
PIF_VALUE: 21
PIF_VALUE: 17
PIF_VALUE: 18
PIF_VALUE: 18
PIF_VALUE: 17
PIF_VALUE: 16
PIF_VALUE: 16
PIF_VALUE: 17
PIF_VALUE: 18
PIF_VALUE: 17
PIF_VALUE: 17
PIF_VALUE: 18
PIF_VALUE: 17
PIF_VALUE: 18
PIF_VALUE: 17
PIF_VALUE: 17
PIF_VALUE: 18
PIF_VALUE: 16
PIF_VALUE: 17
PIF_VALUE: 18
PIF_VALUE: 17
PIF_VALUE: 19
PIF_VALUE: 17
PIF_VALUE: 17
PIF_VALUE: 16
PIF_VALUE: 19
PIF_VALUE: 17
PIF_VALUE: 18
PIF_VALUE: 17
PIF_VALUE: 17
PIF_VALUE: 18
PIF_VALUE: 17
PIF_VALUE: 17
PIF_VALUE: 18
PIF_VALUE: 18
PIF_VALUE: 1
PIF_VALUE: 17
PIF_VALUE: 16
PIF_VALUE: 18
PIF_VALUE: 17
PIF_VALUE: 16
PIF_VALUE: 17
PIF_VALUE: 19
PIF_VALUE: 26
PIF_VALUE: 17
PIF_VALUE: 2
PIF_VALUE: 17
PIF_VALUE: 15
PIF_VALUE: 18
PIF_VALUE: 17
PIF_VALUE: 18
PIF_VALUE: 19
PIF_VALUE: 17
PIF_VALUE: 1
PIF_VALUE: 16
PIF_VALUE: 17
PIF_VALUE: 18
PIF_VALUE: 17
PIF_VALUE: 16
PIF_VALUE: 17
PIF_VALUE: 17
PIF_VALUE: 3
PIF_VALUE: 17
PIF_VALUE: 17
PIF_VALUE: 16
PIF_VALUE: 18
PIF_VALUE: 1
PIF_VALUE: 17
PIF_VALUE: 30
PIF_VALUE: 3
PIF_VALUE: 17
PIF_VALUE: 19
PIF_VALUE: 17
PIF_VALUE: 18
PIF_VALUE: 17
PIF_VALUE: 5
PIF_VALUE: 18
PIF_VALUE: 18
PIF_VALUE: 17
PIF_VALUE: 16

## 2021-12-30 ASSESSMENT — PAIN DESCRIPTION - LOCATION
LOCATION: BACK

## 2021-12-30 ASSESSMENT — PAIN DESCRIPTION - DESCRIPTORS
DESCRIPTORS: ACHING
DESCRIPTORS: BURNING

## 2021-12-30 ASSESSMENT — PAIN SCALES - GENERAL
PAINLEVEL_OUTOF10: 8
PAINLEVEL_OUTOF10: 4
PAINLEVEL_OUTOF10: 8
PAINLEVEL_OUTOF10: 7
PAINLEVEL_OUTOF10: 5
PAINLEVEL_OUTOF10: 8
PAINLEVEL_OUTOF10: 8

## 2021-12-30 ASSESSMENT — PAIN DESCRIPTION - PAIN TYPE
TYPE: SURGICAL PAIN

## 2021-12-30 ASSESSMENT — PAIN - FUNCTIONAL ASSESSMENT: PAIN_FUNCTIONAL_ASSESSMENT: 0-10

## 2021-12-30 NOTE — PROGRESS NOTES
Neurosurgery Post op Progress Note      POD# 0    s/p   Lumbar laminectomy L2-S1    SUBJECTIVE:      Patient presents with decline in her mobility over the course of the last few months where she can barely even ambulate with the walker. Patient also complaining of except anesthesia with perineal numbness anterolateral numbness progressive over last 6 weeks. Patient admitted for lumbar laminectomy L2-S1      OBJECTIVE     VITALS:  BP (!) 108/97   Pulse 90   Temp 98.6 °F (37 °C) (Temporal)   Resp 14   SpO2 100% , Temp (24hrs), Av.6 °F (36.4 °C), Min:96.1 °F (35.6 °C), Max:99.8 °F (37.7 °C)    GENERAL:  awake and alert. No acute distress  CARDIOVASCULAR:  regular rate and rhythm   LUNGS:  CTA Bilaterally  ABDOMEN:   Abdomen soft, non-tender. BS normal. No masses,  No organomegaly, Abdomen soft, non-tender, non-distended  INCISION: Incision clean/dry/intact      Neuro:       A&Ox3   PERRL, EOMI   CNII-XII intact     Normal speech and mentation      L deltoid 5/5; R deltoid 5/5  L biceps 5/5; R biceps 5/5  L triceps 5/5; R triceps 5/5  L wrist extension 5/5; R wrist extension 5/5  L intrinsics 5/5; R intrinsics 5/5         L iliopsoas 2/5, R iliopsoas 5/5  L quadriceps 2/5; R quadriceps 4+/5  L Dorsiflexion 3/5; R dorsiflexion 4/5  L Plantarflexion3/5; R plantarflexion 4+/5        Deep Tendon Reflexes:    Right Bicep:  3+  Left Bicep:  3+  Right Knee:  3+  Left Knee:  3+    No clonus  Positive  Brittany's bilaterally.           Wound   Post op wound:  C/D/I  Dressing is clean/dry/intact with no signs of drainage     ASSESSMENT AND PLAN    62 y.o. female status post lumbar laminectomy post op day # 0    - Analgesia:  Vivian 5 mg q4     - Periop Antibiotics: Ancef 1g q8hrs for 3 doses   - Activity: As tolerated , Bedres  - DVT prophylaxis: SCDs  - Diet: Advance as tolerated  -PT/OT  - Incentive spirometry  - Neuro check as protocol     -Flexeril 10 TID   - Wound care      Electronically signed by Ariadne Willis, MD on 12/30/2021 at 5:16 PM

## 2021-12-30 NOTE — OP NOTE
Operative Note      Patient: Mike Shah  YOB: 1960  MRN: [id number]    Date of Procedure: 12/30/2021    Pre-Op Diagnosis: SPINAL DEFORMITY, CAUDA EQUINA SYNDROME    Post-Op Diagnosis: Same    Indications for procedure. Patient with saddle anesthesia progressive lower extremity weakness ultimately wheelchair-bound over the course of 4 to 6 weeks. Multilevel severe lumbar stenosis noted on imaging correlating with clinical evidence of cauda equina syndrome. Patient scheduled for surgery the next day       Procedure  Laminectomy to decompress the thecal sac at L2, L3, L4, L5, S1  Use of operative microscope  Use of fluoroscopy  22 modifier    Surgeon(s):  Kala Armstrong DO    Assistant:   First Assistant: Frida Sy RN    Anesthesia: General    Estimated Blood Loss (mL): 900     Complications: None    Specimens:   * No specimens in log *    Implants:  * No implants in log *      Drains:   Closed/Suction Drain Inferior;Midline Back 7 Romanian (Active)   Site Description Unable to view 12/30/21 1416   Dressing Status Clean;Dry; Intact 12/30/21 1416   Drainage Appearance Bloody 12/30/21 1416   Status To bulb suction 12/30/21 1416   Output (ml) 30 ml 12/30/21 1430       [REMOVED] Urethral Catheter Non-latex 16 fr (Removed)       Findings: Severe multilevel stenosis    Detailed Description of Procedure:   Patient was consented preoperatively brought into the operative room and placed under general esthesia. She was placed prone on the David table all pressure points padded. Prior incision was marked and extended cephalad and caudad. After sterile prepping draping a timeout is performed spinal needle was used to confirm the L3 spinal needle level. 10 blade is used to perform incision followed a Montejo and Bovie to perform a subperiosteal dissection from L2-S1 preserving joints. Levels were again confirmed using fluoroscopy. Cerebellar retractor was placed.  High-speed bur was then used to drill the lamina bilaterally from L2 all the way to S1. Rongeur was used to remove the bulk of the lamina from L2 all the way to L5 followed by upgoing curette to identify the epidural plane followed by 4 and 3 punch to extend laminectomy bilaterally and undercutting the lateral recesses bilaterally. On the right side at L4-L5 encountered a significant moderate difficulty due to scarring with prior surgery requiring neurolysis and gentle dissection of the epidural scar using a small curette and [place]. This added approximately 40 to 50 minutes of additional surgical time and difficulty. After complete decompression from L2-S1 I confirmed appropriate levels again on fluoroscopy. Ju-Mints was used to obtain hemostasis in the epidural space was hemostased using FloSeal Gelfoam. Vistaseal was sprayed throughout the surgical wound and a 7 flat TRAMAINE drain was tunneled subfascial and secured with drain stitch. The wound was closed with multiple 0 Vicryl's for muscle followed by running oh strata fix for the fascia followed by inverted 2-0 Vicryl's for the subcutaneous tissue and a running 3-0 nylon for skin. Bacitracin island were placed over the wound.  The patient was then flipped back supine extubated in stable condition returned to the PACU    Electronically signed by Jeancarlos Hidalgo DO on 12/30/2021 at 6:32 PM

## 2021-12-30 NOTE — ANESTHESIA PRE PROCEDURE
Department of Anesthesiology  Preprocedure Note       Name:  Baltazar Smith   Age:  62 y.o.  :  1960                                          MRN:  [id number]         Date:  2021      [facility] Hancock[facility]    Procedure: Procedure(s):  LUMBAR LAMINECTOMY L2-S1 [ Anuradha Morn, PRONE, C-ARM, MICROSCOPE]    Medications prior to admission:   Prior to Admission medications    Medication Sig Start Date End Date Taking? Authorizing Provider   pantoprazole (PROTONIX) 40 MG tablet TAKE ONE TABLET BY MOUTH DAILY 21  Yes Darylene Daisy, MD   busPIRone (BUSPAR) 30 MG tablet Take 30 mg by mouth 2 times daily 21  Yes Darylene Daisy, MD   levothyroxine (SYNTHROID) 88 MCG tablet Take 1 tablet by mouth Daily 21 Yes Darylene Daisy, MD   dilTIAZem (DILACOR XR) 180 MG extended release capsule Take 1 capsule by mouth daily 21  Yes Darylene Daisy, MD   tiZANidine (ZANAFLEX) 4 MG tablet Take 4 mg by mouth every 12 hours   Yes Historical Provider, MD   Potassium Gluconate 595 (99 K) MG TABS Take 1 tablet by mouth daily 21  Yes Darylene Daisy, MD   Vitamin D, Ergocalciferol, 50 MCG (3822 UT) CAPS TAKE ONE CAPSULE BY MOUTH DAILY 3/29/21  Yes Marilin Khan PA-C   HYDROcodone-acetaminophen (NORCO) 7.5-325 MG per tablet Up to three tablets a day. 10/21/15  Yes Historical Provider, MD   methadone (DOLOPHINE) 10 MG tablet Take 10 mg by mouth 2 times daily. Yes Historical Provider, MD       Current medications:    Current Facility-Administered Medications   Medication Dose Route Frequency Provider Last Rate Last Admin    lactated ringers infusion   IntraVENous Continuous Delphine Jeffrey  mL/hr at 21 1008 New Bag at 21 1008       Allergies:     Allergies   Allergen Reactions    Latex Hives, Itching, Dermatitis and Rash    Kiwi Extract      Other reaction(s): Facial Swelling       Problem List:    Patient Active Problem List   Diagnosis Code    given: Not Answered      Vital Signs (Current):   Vitals:    12/30/21 1000   BP: (!) 153/64   Pulse: 81   Resp: 14   Temp: 97.5 °F (36.4 °C)   TempSrc: Temporal   SpO2: 96%                                              BP Readings from Last 3 Encounters:   12/30/21 (!) 153/64   12/29/21 (!) 145/84   12/17/21 (!) 170/90       NPO Status:                                                                                 BMI:   Wt Readings from Last 3 Encounters:   12/29/21 140 lb (63.5 kg)   12/17/21 140 lb 3.2 oz (63.6 kg)   11/20/21 130 lb (59 kg)     There is no height or weight on file to calculate BMI.    CBC:   Lab Results   Component Value Date    WBC 4.6 11/21/2021    RBC 3.89 11/21/2021    HGB 12.3 11/21/2021    HCT 38.7 11/21/2021    MCV 99.5 11/21/2021    RDW 12.5 11/21/2021     11/21/2021       CMP:   Lab Results   Component Value Date     11/22/2021    K 4.3 11/22/2021     11/22/2021    CO2 25 11/22/2021    BUN 13 11/22/2021    CREATININE 0.87 11/22/2021    GFRAA >60 11/22/2021    LABGLOM >60 11/22/2021    GLUCOSE 93 11/22/2021    CALCIUM 9.2 11/22/2021       POC Tests: No results for input(s): POCGLU, POCNA, POCK, POCCL, POCBUN, POCHEMO, POCHCT in the last 72 hours.     Coags: No results found for: PROTIME, INR, APTT    HCG (If Applicable): No results found for: PREGTESTUR, PREGSERUM, HCG, HCGQUANT     ABGs: No results found for: PHART, PO2ART, JJP3LRZ, JGS4MIV, BEART, Z8GWFCQO     Type & Screen (If Applicable):  No results found for: LABABO, LABRH    Drug/Infectious Status (If Applicable):  No results found for: HIV, HEPCAB    COVID-19 Screening (If Applicable): No results found for: COVID19        Anesthesia Evaluation   no history of anesthetic complications:   Airway: Mallampati: II        Dental:          Pulmonary:normal exam  breath sounds clear to auscultation                             Cardiovascular:    (+) hypertension:,         Rhythm: regular  Rate: normal Neuro/Psych:   (+) neuromuscular disease:,             GI/Hepatic/Renal:   (+) GERD:, PUD,           Endo/Other:    (+) hypothyroidism::., malignancy/cancer. ROS comment: Cancer ([facility] [address] right breast  Abdominal:             Vascular: Other Findings:             Anesthesia Plan      general     ASA 3       Induction: intravenous. MIPS: Postoperative opioids intended, Prophylactic antiemetics administered and Postoperative trial extubation. Plan discussed with CRNA.                   Francisco Javier Wilkinson MD   12/30/2021

## 2021-12-30 NOTE — H&P
History and Physical    Pt Name: Kristina Cabrera  MRN: [id number]  YOB: 1960  Date of evaluation: 12/30/2021    SUBJECTIVE:   History of Chief Complaint:    Patient presents preprocedure for lumbar laminectomy. She has had two lumbar spine surgeries in the past as well as cervical spine x 1. She currently complains of lumbar disc disease, lower extremity weakness per documentation in chart, difficulty with bowel and bladder control. She has been scheduled for lumbar laminectomy today. Past Medical History    has a past medical history of Anxiety, Arthritis, At maximum risk for fall, Cancer ([facility] [address] Cauda equina syndrome (HCC), Cervical stenosis of spine, GERD (gastroesophageal reflux disease), History of stomach ulcers, Hypertension, Hypothyroidism, Lumbar neuritis, Post laminectomy syndrome, Spinal deformity, and Thyroid disease. History per available medical records. Past Surgical History   has a past surgical history that includes back surgery; hernia repair (Bilateral); Breast surgery (Right); cervical laminectomy (11/04/2014); Mastectomy, radical; and Hysterectomy, total abdominal.  Medications  Prior to Admission medications    Medication Sig Start Date End Date Taking?  Authorizing Provider   pantoprazole (PROTONIX) 40 MG tablet TAKE ONE TABLET BY MOUTH DAILY 12/20/21  Yes Greg Huston MD   busPIRone (BUSPAR) 30 MG tablet Take 30 mg by mouth 2 times daily 12/17/21  Yes Greg Huston MD   levothyroxine (SYNTHROID) 88 MCG tablet Take 1 tablet by mouth Daily 12/17/21 1/16/22 Yes Greg Huston MD   dilTIAZem (DILACOR XR) 180 MG extended release capsule Take 1 capsule by mouth daily 12/17/21  Yes Greg Hustno MD   tiZANidine (ZANAFLEX) 4 MG tablet Take 4 mg by mouth every 12 hours   Yes Historical Provider, MD   Potassium Gluconate 595 (99 K) MG TABS Take 1 tablet by mouth daily 9/16/21  Yes Greg Huston MD   Vitamin D, Ergocalciferol, 50 MCG (2000 UT) CAPS TAKE ONE CAPSULE BY MOUTH DAILY 3/29/21  Yes Dahlia Spicer PA-C   HYDROcodone-acetaminophen [facility]) 7.5-325 MG per tablet Up to three tablets a day. 10/21/15  Yes Historical Provider, MD   methadone (DOLOPHINE) 10 MG tablet Take 10 mg by mouth 2 times daily. Yes Historical Provider, MD     Allergies  is allergic to latex and kiwi extract. Family History  family history includes Cancer in her mother; Heart Disease in her mother; Hypertension in her mother. Social History   reports that she quit smoking about 8 years ago. She has a 15.00 pack-year smoking history. She has never used smokeless tobacco.   reports current alcohol use. reports no history of drug use. Marital Status single  Occupation disabled    Review of Systems:  CONSTITUTIONAL:   negative for fevers, chills, fatigue and malaise    EYES:   negative for double vision, blurred vision and photophobia    HEENT:   negative for tinnitus, epistaxis and sore throat     RESPIRATORY:   negative for cough, shortness of breath, wheezing     CARDIOVASCULAR:   negative for chest pain, palpitations, syncope, edema     GASTROINTESTINAL:   negative for nausea, vomiting     GENITOURINARY:   negative for incontinence     MUSCULOSKELETAL:   See HPI   NEUROLOGICAL:   Negative for weakness and tingling  negative for headaches and dizziness     PSYCHIATRIC:   negative for anxiety         OBJECTIVE:   VITALS:  temporal temperature is 97.5 °F (36.4 °C). Her blood pressure is 153/64 (abnormal) and her pulse is 81. Her respiration is 14 and oxygen saturation is 96%. CONSTITUTIONAL:alert & cooperative, no acute distress. Pleasant. Limited exam due to time constraints. SKIN:  Warm and dry, no rashes on exposed areas of skin. HEAD:  Normocephalic, atraumatic   EYES: PERRL. EOMs intact. EARS:  Hearing grossly WNL. NOSE:  Nares patent. No rhinorrhea. MOUTH/THROAT:  benign  NECK:supple, no lymphadenopathy  LUNGS: Clear to auscultation bilaterally, no wheezes.   CARDIOVASCULAR: Heart sounds are normal.  Regular rate and rhythm without murmur. ABDOMEN: soft, non tender, non distended. EXTREMITIES: no edema bilateral lower extremities. IMPRESSIONS:   1. Lumbar disc disease  2.  has a past medical history of Anxiety, Arthritis, At maximum risk for fall (12/29/2021), Cancer ([facility] [address] Cauda equina syndrome ([facility] [address] (12/29/2021), Cervical stenosis of spine (12/29/2021), GERD (gastroesophageal reflux disease), History of stomach ulcers, Hypertension, Hypothyroidism, Lumbar neuritis (9/8/2016), Post laminectomy syndrome (9/8/2016), Spinal deformity (11/2021), and Thyroid disease. PLANS:   1.  Lumbar laminectomy    ЕКАТЕРИНА Finn PA-C  Electronically signed 12/30/2021 at 11:05 AM

## 2021-12-31 DIAGNOSIS — E87.6 HYPOKALEMIA: ICD-10-CM

## 2021-12-31 PROBLEM — D64.9 NORMOCYTIC NORMOCHROMIC ANEMIA: Status: ACTIVE | Noted: 2021-12-31

## 2021-12-31 LAB
ABSOLUTE EOS #: 0 K/UL (ref 0–0.44)
ABSOLUTE IMMATURE GRANULOCYTE: 0.13 K/UL (ref 0–0.3)
ABSOLUTE LYMPH #: 0.54 K/UL (ref 1.1–3.7)
ABSOLUTE MONO #: 0.27 K/UL (ref 0.1–1.2)
BASOPHILS # BLD: 0 % (ref 0–2)
BASOPHILS ABSOLUTE: 0 K/UL (ref 0–0.2)
DIFFERENTIAL TYPE: ABNORMAL
EOSINOPHILS RELATIVE PERCENT: 0 % (ref 1–4)
HCT VFR BLD CALC: 32 % (ref 36.3–47.1)
HEMOGLOBIN: 10.4 G/DL (ref 11.9–15.1)
IMMATURE GRANULOCYTES: 1 %
LYMPHOCYTES # BLD: 4 % (ref 24–43)
MCH RBC QN AUTO: 32.5 PG (ref 25.2–33.5)
MCHC RBC AUTO-ENTMCNC: 32.5 G/DL (ref 28.4–34.8)
MCV RBC AUTO: 100 FL (ref 82.6–102.9)
MONOCYTES # BLD: 2 % (ref 3–12)
MORPHOLOGY: NORMAL
NRBC AUTOMATED: 0 PER 100 WBC
PDW BLD-RTO: 12.5 % (ref 11.8–14.4)
PLATELET # BLD: 274 K/UL (ref 138–453)
PLATELET ESTIMATE: ABNORMAL
PMV BLD AUTO: 9.5 FL (ref 8.1–13.5)
RBC # BLD: 3.2 M/UL (ref 3.95–5.11)
RBC # BLD: ABNORMAL 10*6/UL
SEG NEUTROPHILS: 93 % (ref 36–65)
SEGMENTED NEUTROPHILS ABSOLUTE COUNT: 12.46 K/UL (ref 1.5–8.1)
WBC # BLD: 13.4 K/UL (ref 3.5–11.3)
WBC # BLD: ABNORMAL 10*3/UL

## 2021-12-31 PROCEDURE — 6370000000 HC RX 637 (ALT 250 FOR IP): Performed by: STUDENT IN AN ORGANIZED HEALTH CARE EDUCATION/TRAINING PROGRAM

## 2021-12-31 PROCEDURE — 6360000002 HC RX W HCPCS: Performed by: STUDENT IN AN ORGANIZED HEALTH CARE EDUCATION/TRAINING PROGRAM

## 2021-12-31 PROCEDURE — 85025 COMPLETE CBC W/AUTO DIFF WBC: CPT

## 2021-12-31 PROCEDURE — 99222 1ST HOSP IP/OBS MODERATE 55: CPT | Performed by: INTERNAL MEDICINE

## 2021-12-31 PROCEDURE — 97166 OT EVAL MOD COMPLEX 45 MIN: CPT

## 2021-12-31 PROCEDURE — 1200000000 HC SEMI PRIVATE

## 2021-12-31 PROCEDURE — 2580000003 HC RX 258: Performed by: STUDENT IN AN ORGANIZED HEALTH CARE EDUCATION/TRAINING PROGRAM

## 2021-12-31 PROCEDURE — 97535 SELF CARE MNGMENT TRAINING: CPT

## 2021-12-31 PROCEDURE — 97116 GAIT TRAINING THERAPY: CPT

## 2021-12-31 PROCEDURE — 97162 PT EVAL MOD COMPLEX 30 MIN: CPT

## 2021-12-31 PROCEDURE — 6370000000 HC RX 637 (ALT 250 FOR IP): Performed by: INTERNAL MEDICINE

## 2021-12-31 RX ORDER — BETHANECHOL CHLORIDE 5 MG
10 TABLET ORAL 3 TIMES DAILY
Status: DISCONTINUED | OUTPATIENT
Start: 2021-12-31 | End: 2022-01-02

## 2021-12-31 RX ORDER — LEVOTHYROXINE SODIUM 88 UG/1
88 TABLET ORAL DAILY
Status: DISCONTINUED | OUTPATIENT
Start: 2021-12-31 | End: 2022-01-04 | Stop reason: HOSPADM

## 2021-12-31 RX ORDER — IBUPROFEN 800 MG/1
800 TABLET ORAL EVERY 6 HOURS PRN
Qty: 28 TABLET | Refills: 0 | OUTPATIENT
Start: 2021-12-31 | End: 2022-01-07

## 2021-12-31 RX ORDER — METHADONE HYDROCHLORIDE 10 MG/1
10 TABLET ORAL 2 TIMES DAILY
Status: DISCONTINUED | OUTPATIENT
Start: 2021-12-31 | End: 2022-01-04 | Stop reason: HOSPADM

## 2021-12-31 RX ORDER — CYCLOBENZAPRINE HCL 10 MG
10 TABLET ORAL 3 TIMES DAILY PRN
Qty: 21 TABLET | Refills: 0 | OUTPATIENT
Start: 2021-12-31 | End: 2022-01-07

## 2021-12-31 RX ORDER — OXYCODONE HYDROCHLORIDE AND ACETAMINOPHEN 5; 325 MG/1; MG/1
1 TABLET ORAL EVERY 6 HOURS PRN
Qty: 28 TABLET | Refills: 0 | OUTPATIENT
Start: 2021-12-31 | End: 2022-01-07

## 2021-12-31 RX ORDER — DILTIAZEM HYDROCHLORIDE 90 MG/1
180 CAPSULE, EXTENDED RELEASE ORAL DAILY
Status: DISCONTINUED | OUTPATIENT
Start: 2021-12-31 | End: 2022-01-04 | Stop reason: HOSPADM

## 2021-12-31 RX ORDER — BACLOFEN 10 MG/1
10 TABLET ORAL 3 TIMES DAILY
Status: DISCONTINUED | OUTPATIENT
Start: 2021-12-31 | End: 2022-01-04 | Stop reason: HOSPADM

## 2021-12-31 RX ORDER — PANTOPRAZOLE SODIUM 40 MG/1
40 TABLET, DELAYED RELEASE ORAL DAILY
Status: DISCONTINUED | OUTPATIENT
Start: 2021-12-31 | End: 2022-01-04 | Stop reason: HOSPADM

## 2021-12-31 RX ORDER — BUSPIRONE HYDROCHLORIDE 15 MG/1
30 TABLET ORAL 2 TIMES DAILY
Status: DISCONTINUED | OUTPATIENT
Start: 2021-12-31 | End: 2022-01-04 | Stop reason: HOSPADM

## 2021-12-31 RX ADMIN — OXYCODONE HYDROCHLORIDE 5 MG: 5 TABLET ORAL at 14:01

## 2021-12-31 RX ADMIN — BETHANECHOL CHLORIDE 10 MG: 5 TABLET ORAL at 20:35

## 2021-12-31 RX ADMIN — CYCLOBENZAPRINE 10 MG: 10 TABLET, FILM COATED ORAL at 14:00

## 2021-12-31 RX ADMIN — METHADONE HYDROCHLORIDE 10 MG: 10 TABLET ORAL at 09:41

## 2021-12-31 RX ADMIN — BETHANECHOL CHLORIDE 10 MG: 5 TABLET ORAL at 14:00

## 2021-12-31 RX ADMIN — BUSPIRONE HYDROCHLORIDE 30 MG: 15 TABLET ORAL at 20:33

## 2021-12-31 RX ADMIN — CEFAZOLIN 2000 MG: 10 INJECTION, POWDER, FOR SOLUTION INTRAVENOUS; PARENTERAL at 11:35

## 2021-12-31 RX ADMIN — ENOXAPARIN SODIUM 40 MG: 100 INJECTION SUBCUTANEOUS at 12:31

## 2021-12-31 RX ADMIN — CYCLOBENZAPRINE 10 MG: 10 TABLET, FILM COATED ORAL at 09:40

## 2021-12-31 RX ADMIN — PANTOPRAZOLE SODIUM 40 MG: 40 TABLET, DELAYED RELEASE ORAL at 16:22

## 2021-12-31 RX ADMIN — OXYCODONE HYDROCHLORIDE 10 MG: 5 TABLET ORAL at 05:12

## 2021-12-31 RX ADMIN — CYCLOBENZAPRINE 10 MG: 10 TABLET, FILM COATED ORAL at 20:33

## 2021-12-31 RX ADMIN — BETHANECHOL CHLORIDE 10 MG: 5 TABLET ORAL at 09:42

## 2021-12-31 RX ADMIN — CEFAZOLIN 2000 MG: 10 INJECTION, POWDER, FOR SOLUTION INTRAVENOUS; PARENTERAL at 02:00

## 2021-12-31 RX ADMIN — LEVOTHYROXINE SODIUM 88 MCG: 88 TABLET ORAL at 16:21

## 2021-12-31 RX ADMIN — DILTIAZEM HYDROCHLORIDE 180 MG: 90 CAPSULE, EXTENDED RELEASE ORAL at 16:18

## 2021-12-31 RX ADMIN — OXYCODONE HYDROCHLORIDE 10 MG: 5 TABLET ORAL at 20:33

## 2021-12-31 RX ADMIN — METHADONE HYDROCHLORIDE 10 MG: 10 TABLET ORAL at 20:34

## 2021-12-31 ASSESSMENT — PAIN SCALES - GENERAL
PAINLEVEL_OUTOF10: 6
PAINLEVEL_OUTOF10: 7
PAINLEVEL_OUTOF10: 5
PAINLEVEL_OUTOF10: 8
PAINLEVEL_OUTOF10: 5
PAINLEVEL_OUTOF10: 6
PAINLEVEL_OUTOF10: 9
PAINLEVEL_OUTOF10: 6
PAINLEVEL_OUTOF10: 7
PAINLEVEL_OUTOF10: 8
PAINLEVEL_OUTOF10: 3

## 2021-12-31 ASSESSMENT — PAIN DESCRIPTION - PAIN TYPE
TYPE: SURGICAL PAIN

## 2021-12-31 ASSESSMENT — PAIN DESCRIPTION - LOCATION
LOCATION: BACK

## 2021-12-31 ASSESSMENT — PAIN DESCRIPTION - DESCRIPTORS: DESCRIPTORS: ACHING;DISCOMFORT

## 2021-12-31 ASSESSMENT — PAIN - FUNCTIONAL ASSESSMENT: PAIN_FUNCTIONAL_ASSESSMENT: PREVENTS OR INTERFERES SOME ACTIVE ACTIVITIES AND ADLS

## 2021-12-31 NOTE — PROGRESS NOTES
Occupational Therapy   Occupational Therapy Initial Assessment  Date: 2021   Patient Name: Brigido Medrano  MRN: [id number]     : 1960    Date of Service: 2021    Discharge Recommendations:    Further therapy recommended at discharge. Assessment   Performance deficits / Impairments: Decreased functional mobility ; Decreased ADL status; Decreased endurance;Decreased high-level IADLs;Decreased balance  Assessment: pt required increased physical assistance for all functional transfers/mobility and ADLs on this date. pt would benefit from further therapy at discharge in order to increase safety and independence. Treatment Diagnosis: lumbar laminectomy  Prognosis: Good  Decision Making: Medium Complexity  Patient Education: pt ed on POC, purpose of eval, importance of movement, log roll tech, safety during functional transfers/functional mobility. good return  REQUIRES OT FOLLOW UP: Yes  Activity Tolerance  Activity Tolerance: Patient Tolerated treatment well;Patient limited by pain  Safety Devices  Safety Devices in place: Yes  Type of devices: Gait belt;Call light within reach; Left in bed  Restraints  Initially in place: No           Patient Diagnosis(es): The encounter diagnosis was Cauda equina compression ([address] has a past medical history of Anxiety, Arthritis, At maximum risk for fall, Cancer ([address] Cauda equina syndrome (HCC), Cervical stenosis of spine, GERD (gastroesophageal reflux disease), History of stomach ulcers, Hypertension, Hypothyroidism, Lumbar neuritis, Post laminectomy syndrome, Spinal deformity, and Thyroid disease. has a past surgical history that includes back surgery; hernia repair (Bilateral); Breast surgery (Right); cervical laminectomy (2014);  Mastectomy, radical; and Hysterectomy, total abdominal.    Treatment Diagnosis: lumbar laminectomy      Restrictions  Restrictions/Precautions  Restrictions/Precautions: Fall Risk  Required Braces or Orthoses?: No  Position Activity Restriction  Other position/activity restrictions: activity as tolerated    Subjective   General  Patient assessed for rehabilitation services?: Yes  Family / Caregiver Present: Yes (pt friend present for duration of session)  Diagnosis: lumbar laminectomy  General Comment  Comments: RN ok'd for therapy this afternoon. pt agreeable to participate in session and cooperative/pleasant throughout  Patient Currently in Pain: Yes  Pain Assessment  Pain Assessment: 0-10  Pain Level: 5  Pain Type: Surgical pain  Pain Location: Back  Pain Descriptors: Aching;Discomfort  Functional Pain Assessment: Prevents or interferes some active activities and ADLs  Non-Pharmaceutical Pain Intervention(s): Ambulation/Increased Activity; Distraction; Therapeutic presence  Response to Pain Intervention: Patient Satisfied    Social/Functional History  Social/Functional History  Lives With: Significant other  Type of Home: House  Home Layout: Two level,Able to Live on Main level with bedroom/bathroom  Home Access: Stairs to enter without rails  Entrance Stairs - Number of Steps: 1  Bathroom Shower/Tub: Tub/Shower unit  Bathroom Toilet: Standard  Bathroom Equipment: Toilet raiser  Home Equipment: Rolling walker,4 wheeled walker (pt reported using RW majority of time)  ADL Assistance: Independent (pt reported occasionally requiring assistance for donning shirt.  pt reported signficantother assists with getting in/out shower)  Homemaking Assistance: Needs assistance  Homemaking Responsibilities: Yes (pt reported splitting with IADLs)  Meal Prep Responsibility: Secondary  Laundry Responsibility: Secondary  Cleaning Responsibility: Secondary  Ambulation Assistance: Independent  Transfer Assistance: Independent  Active : No (pt requires assistance for getting into truck)  Patient's  Info: significant other drives  Mode of Transportation: Truck  Occupation: On disability  Leisure & Hobbies: watch TV  Additional Comments: pt reported significant other works but able to assist PRN when home       Objective   Vision: Impaired  Vision Exceptions: Wears glasses for reading  Hearing: Within functional limits    Orientation  Overall Orientation Status: Within Functional Limits  Observation/Palpation  Posture: Fair  Balance  Sitting Balance: Supervision (~8 minutes on eOB)  Standing Balance: Minimal assistance (with RW)  Standing Balance  Time: ~3 minutes  Activity: pt completed functional mobility to doorway and back to bed  Comment: pt with 1 LOB due to muscle spasm but able to maintain balance  Functional Mobility  Functional - Mobility Device: Rolling Walker  Activity: Other  Assist Level: Minimal assistance  ADL  Feeding: Modified independent   Grooming: Modified independent   UE Bathing: Stand by assistance  LE Bathing: Moderate assistance  UE Dressing: Stand by assistance  LE Dressing: Moderate assistance  Toileting: Moderate assistance  Additional Comments: OT failitated pt in donning gown around back while sitting on EOB with SBA for safety and line management  Tone RUE  RUE Tone: Normotonic  Tone LUE  LUE Tone: Normotonic  Coordination  Movements Are Fluid And Coordinated: Yes     Bed mobility  Supine to Sit: Minimal assistance;2 Person assistance  Sit to Supine: Moderate assistance (for B LE progression)  Scooting: Moderate assistance  Comment: pt ed on log roll tech with fair demo  Transfers  Sit to stand: 2 Person assistance; Moderate assistance  Stand to sit: Moderate assistance;2 Person assistance  Transfer Comments: with RW     Cognition  Overall Cognitive Status: WFL        Sensation  Overall Sensation Status: Impaired (pt reported chronic N/T to B feet and hands, but pt reported slight improvement since surgery)        LUE AROM (degrees)  LUE AROM : WFL  Left Hand AROM (degrees)  Left Hand AROM: WFL  RUE AROM (degrees)  RUE AROM : WFL  Right Hand AROM (degrees)  Right Hand AROM: WFL  LUE Strength  Gross LUE Strength: Exceptions to [facility]  L Shoulder Flex: 4/5  L Hand General: 4/5  RUE Strength  Gross RUE Strength: Exceptions to [facility]  R Shoulder Flex: 4/5  R Hand General: 4/5                   Plan   Plan  Times per week: 3-4x/wk    AM-PAC Score        AM-PAC Inpatient Daily Activity Raw Score: 17 (12/31/21 1509)  AM-PAC Inpatient ADL T-Scale Score : 37.26 (12/31/21 1509)  ADL Inpatient CMS 0-100% Score: 50.11 (12/31/21 1509)  ADL Inpatient CMS G-Code Modifier : CK (12/31/21 1509)    Goals  Short term goals  Time Frame for Short term goals: pt will, by discharge  Short term goal 1: complete LB ADLs and toileting tasks with min A, set up and AE, as needed  Short term goal 2: complete UB ADLs with supervision and set up  Short term goal 3: increase activity  tolerance to 20+ minutes in order to participate in daily tasks  Short term goal 4: dem CGA During functional transfers/functional mobility with LRD, as needed  Short term goal 5: dem ~6 minutes dynamic standing balance with CGA and LRD in order to complete functional tasks       Therapy Time   Individual Concurrent Group Co-treatment   Time In 1318         Time Out 1344         Minutes 26      co-eval with PT    Timed Code Treatment Minutes: Wilmer 1765, OTR/L

## 2021-12-31 NOTE — PROGRESS NOTES
Neurosurgery AILEEN/Resident    Daily Progress Note   No chief complaint on file. 12/31/2021  12:21 PM    Chart reviewed. No acute events overnight. POD# 1 lumbar laminectomy L2-S1 for cauda equina decompression. No new complaints. Drain output 190 cc overnight. Vitals:    12/31/21 0400 12/31/21 0901 12/31/21 1103 12/31/21 1131   BP: 115/73      Pulse: 90  97    Resp: 22  20    Temp: 98.2 °F (36.8 °C) 98.3 °F (36.8 °C)  98.4 °F (36.9 °C)   TempSrc: Temporal Oral  Oral   SpO2: 99%  95% 96%         PE:   AOx3   CNII-XII intact   PERRL, EOMI   Motor   L deltoid 5/5; R deltoid 5/5  L biceps 5/5; R biceps 5/5  L triceps 5/5; R triceps 5/5  L wrist extension 5/5; R wrist extension 5/5  L intrinsics 5/5; R intrinsics 5/5          L iliopsoas 2/5, R iliopsoas 5/5  L quadriceps 2/5; R quadriceps 4+/5  L Dorsiflexion 3/5; R dorsiflexion 4/5  L Plantarflexion3/5; R plantarflexion 4+/5    Sensation    Drain output 190 cc overnight   Incision cdi      Lab Results   Component Value Date    WBC 13.4 (H) 12/31/2021    HGB 10.4 (L) 12/31/2021    HCT 32.0 (L) 12/31/2021     12/31/2021    CHOL 198 07/23/2020    TRIG 96 07/23/2020     (A) 07/23/2020     11/22/2021    K 4.3 11/22/2021     11/22/2021    CREATININE 0.79 12/30/2021    BUN 13 11/22/2021    CO2 25 11/22/2021    GLUF 109 07/23/2020       Radiology     No new imaging. A/P  62 y.o. female status post lumbar laminectomy post op day # 1      - Analgesia:  Vivian 5 mg q4     - Periop Antibiotics: Ancef 1g q8hrs for 3 doses   - Activity: As tolerated   - DVT prophylaxis: SCDs  - Diet: Advance as tolerated  - PT/OT  - resume home dose methadone  - PMR consult   - Incentive spirometry  - Neuro check as protocol     -Flexeril 10 TID   - Wound care   - start Lovenox 40 SQ QD today       Please contact neurosurgery with any changes in patients neurologic status.        Estevan Alvarado MD, [facility]  Neurosurgery Service  12/31/2021 at 12:21 PM         This note is created with the assistance of a speech recognition program.  While intending to generate a document that actually reflects the content of the visit, the document can still have some errors including those of syntax and sound like substitutions which may escape proof reading. In such instances, actual meaning can be extrapolated by contextual diversion.

## 2021-12-31 NOTE — PROGRESS NOTES
Physical Therapy    Facility/Department: [address] ORTHO/MED SURG  Initial Assessment    NAME: Radha Yanes  : 1960  MRN: [id number]    Date of Service: 2021  Copied from chart:  POD# 1 lumbar laminectomy L2-S1 for cauda equina decompression. Discharge Recommendations:   Patient would benefit from continued therapy after discharge   PT Equipment Recommendations  Equipment Needed: No (pt reports owning a RW and a 4WW, writer recommends  use of RW)    Assessment   Body structures, Functions, Activity limitations: Decreased functional mobility ; Decreased balance;Decreased endurance;Decreased strength; Increased pain;Decreased sensation  Assessment: Pt with mobility deficits requiring mod-A x2 to perform sit<>stand transfer, min-A to ambulate 20 feet with a RW. Pt is a fall risk this date secondary to decreased standing balance, decreased endurance, and significant pain with ambulation. Pt would be unsafe to return to prior living arrangements upon discharge. Pt would benefit from additional intense therapy upon discharge to assist in return to prior level of functional independence. Pt is expected to be able to tolerate 3 hours of therapy per day 5 days per week. Prognosis: Good  Decision Making: Medium Complexity  PT Education: Goals;PT Role;Plan of Care;Gait Training;Transfer Training;Functional Mobility Training  Patient Education: Pt was educated on log roll technique for bed mobility. REQUIRES PT FOLLOW UP: Yes  Activity Tolerance  Activity Tolerance: Patient limited by pain; Patient Tolerated treatment well       Patient Diagnosis(es): The encounter diagnosis was Cauda equina compression ([address]      has a past medical history of Anxiety, Arthritis, At maximum risk for fall, Cancer ([address] Cauda equina syndrome (HCC), Cervical stenosis of spine, GERD (gastroesophageal reflux disease), History of stomach ulcers, Hypertension, Hypothyroidism, Lumbar neuritis, Post laminectomy syndrome, Spinal deformity, and Thyroid disease. has a past surgical history that includes back surgery; hernia repair (Bilateral); Breast surgery (Right); cervical laminectomy (11/04/2014); Mastectomy, radical; and Hysterectomy, total abdominal.    Restrictions  Restrictions/Precautions  Required Braces or Orthoses?: No  Position Activity Restriction  Other position/activity restrictions: activity as tolerated  Vision/Hearing  Vision: Impaired  Vision Exceptions: Wears glasses for reading  Hearing: Within functional limits     Subjective  General  Patient assessed for rehabilitation services?: Yes  Response To Previous Treatment: Not applicable  Family / Caregiver Present: Yes (friend)  Follows Commands: Within Functional Limits  Subjective  Subjective: Pt supine in bed and agreeable to therapy, RN agreeable to therapy. Pt pleasant and cooperative throughout today's session. Pain Screening  Patient Currently in Pain: Yes  Pain Assessment  Pain Assessment: 0-10  Pain Level: 9  Pain Type: Surgical pain  Pain Location: Back  Pain Descriptors: Aching;Discomfort  Functional Pain Assessment: Prevents or interferes some active activities and ADLs  Non-Pharmaceutical Pain Intervention(s): Ambulation/Increased Activity; Distraction;Repositioned; Emotional support  Response to Pain Intervention: Patient Satisfied  Vital Signs  Patient Currently in Pain: Yes       Social/Functional History  Social/Functional History  Lives With: Significant other  Type of Home: House  Home Layout: Two level,Able to Live on Main level with bedroom/bathroom  Home Access: Stairs to enter without rails  Entrance Stairs - Number of Steps: 1  Bathroom Shower/Tub: Tub/Shower unit  Bathroom Toilet: Standard  Bathroom Equipment: Toilet raiser  Home Equipment: Rolling walker,4 wheeled walker (pt reported using RW majority of time)  ADL Assistance: Independent (pt reported occasionally requiring assistance for donning shirt.  pt reported signficantother assists with getting in/out shower)  Homemaking Assistance: Needs assistance  Homemaking Responsibilities: Yes (pt reported splitting with IADLs)  Meal Prep Responsibility: Secondary  Laundry Responsibility: Secondary  Cleaning Responsibility: Secondary  Ambulation Assistance: Independent  Transfer Assistance: Independent  Active : No (pt requires assistance for getting into truck)  Patient's  Info: significant other drives  Mode of Transportation: Truck  Occupation: On disability  Leisure & Hobbies: watch TV  Additional Comments: pt reported significant other works but able to assist PRN when home  Cognition   Cognition  Overall Cognitive Status: WFL    Objective     Observation/Palpation  Posture: Fair    AROM RLE (degrees)  RLE AROM: WFL  AROM LLE (degrees)  LLE AROM : WFL  AROM RUE (degrees)  RUE General AROM: Co-eval with OT, see OT note for UE detail  AROM LUE (degrees)  LUE General AROM: Co-eval with OT, see OT note for UE detail  Strength RLE  Strength RLE: Exception  R Hip Flexion: 4/5  R Knee Extension: 4/5  R Ankle Dorsiflexion: 4/5  R Ankle Plantar flexion: 4/5  Strength LLE  Strength LLE: Exception  L Hip Flexion: 3+/5  L Knee Extension: 3+/5  L Ankle Dorsiflexion: 3+/5  L Ankle Plantar Flexion: 3+/5  Strength RUE  Comment: Co-eval with OT, see OT note for UE detail  Strength LUE  Comment: Co-eval with OT, see OT note for UE detail     Sensation  Overall Sensation Status: Impaired (pt reports chronic numbness in bilateral hands and feet, toward the conclusion of today's session pt reports burning sensation in bilateral feet and legs.)  Bed mobility  Supine to Sit: Minimal assistance;2 Person assistance (1x assist for BLE progression, 1x assist for trunk progression.)  Sit to Supine: Moderate assistance (for BLE progression)  Scooting: Moderate assistance  Comment: Bed mobility performed with the HOB elevated ~35 degrees without use of handrails. Pt educated on log roll technique.   Increased time/effort to perform. Transfers  Sit to Stand: Moderate Assistance;2 Person Assistance  Stand to sit: Moderate Assistance  Comment: Verbal cues for hand placement with good return. Ambulation  Ambulation?: Yes  More Ambulation?: No  Ambulation 1  Surface: level tile  Device: Rolling Walker  Assistance: Minimal assistance  Quality of Gait: unsteady, 1x muscle spasm RLE, decreased gait speed, no true LOB  Gait Deviations: Slow Sharon;Staggers; Shuffles  Distance: 20 feet  Stairs/Curb  Stairs?: No     Balance  Posture: Fair  Sitting - Static: Good;-  Sitting - Dynamic: Fair  Standing - Static: Fair  Standing - Dynamic: Fair;-  Comments: standing balance assessed while using a RW        Plan   Plan  Times per week: 5-6x  Times per day: Daily  Current Treatment Recommendations: Strengthening,ROM,Stair training,Balance Training,Gait Training,Patient/Caregiver Education & Training,Equipment Evaluation, Education, & procurement,Neuromuscular Re-education,Functional Mobility Training,Endurance Training,Transfer Training,Safety Education & Training,Home Exercise Program  Safety Devices  Type of devices: Left in bed,Call light within reach,Gait belt,Patient at risk for falls  Restraints  Initially in place: No                                                 AM-PAC Score  AM-PAC Inpatient Mobility Raw Score : 14 (12/31/21 1405)  AM-PAC Inpatient T-Scale Score : 38.1 (12/31/21 1405)  Mobility Inpatient CMS 0-100% Score: 61.29 (12/31/21 1405)  Mobility Inpatient CMS G-Code Modifier : CL (12/31/21 1405)          Goals  Short term goals  Time Frame for Short term goals: 14 visits  Short term goal 1: Pt will ambulate 200 feet with a RW and SBA to increase functional independence. Short term goal 2: Pt will tolerate a 35 minute therapy session to promote increased endurance. Short term goal 3: Pt will demonstrate good- standing balance to decrease fall risk.   Short term goal 4: Pt will perform sit<>stand transfer with supervision to increase functional independence. Short term goal 5: Pt will negotiate 1 stair with no handrail and SBA to allow the pt to enter prior living arrangements.        Therapy Time   Individual Concurrent Group Co-treatment   Time In 1318         Time Out 1342         Minutes 24         Timed Code Treatment Minutes: 8 Minutes       Cynthia Marrero PT

## 2021-12-31 NOTE — CONSULTS
[facility]  Office: [address], DO, Rachel Renee, DO, Juarez Rickon, DO, Jagdish Osei Blood, DO, Abiola Magana MD, Mariann Gooden MD, Pricila Head MD, Annie Vicente MD, Marcel Bojorquez MD, Rosalinda Ingram MD, Aleena Julien MD, Marilin Galaviz, DO, Evens Pedro, DO, Lara Anaya MD,  Lorri Glynn, DO, Ori Rivera MD, Hiram Mccann MD, Dayana Ospina MD, Cm Moreland MD, Annalise Magana MD, Lavonne Tinajero MD, Naomi Edgar MD, Jarrell Gonzales, [facility], [facility], CNP, Benjamin Cochran, CNP, Andrea Deshpande, CNS, Alfredo Osgood, CNP, Chrei Alert, CNP, Jelly Pollock, CNP, George Webb, CNP, Elva Echeverria, CNP, Ambrose Jordan PA-C, Nehal Lara, DNP, Jason Valdivia, DNP, Bryce Puente, CNP, Kelsey Moise, CNP, Aditya Neal, CNP, Kj Hyde, CNP, Steffany Spence, CNP, Kitty Frazier, CNP         [facility]   [address] [facility] / HISTORY AND PHYSICAL EXAMINATION            Date:   12/31/2021  Patient name:  Javier Purdy  Date of admission:  12/30/2021  8:12 AM  MRN:   [id number]  Account:  [de-identified]  YOB: 1960  PCP:    Kimberly Matt MD  Room:   9913/4638-99  Code Status:    Prior    Physician Requesting Consult: Rich Mcclelland DO    Reason for Consult:  Medical management     Chief Complaint:     weakness    History Obtained From:     patient, electronic medical record    History of Present Illness: This is a 11year-old female who presented to the hospital with complaints of lower extremity weakness. Internal medicine was consulted for medical management. Patient has history of hypertension is on Cardizem. Also has a history of hypothyroidism is on Synthroid. Patient has a history of GERD and is on Protonix. Denies any other significant medical history. Patient denies any fevers, chills, nausea, vomiting, diarrhea. Denies any history of diabetes. Denies any history of atrial fibrillation/CHF/CAD. Patient tolerated surgery well. He is eating, drinking without issue. Pain is controlled. He feels like her strength in her lower extremity is much better than before. Past Medical History:     Past Medical History:   Diagnosis Date    Anxiety     Arthritis     At maximum risk for fall 12/29/2021    caudal equina syndrome and cervical stenosis    Cancer ([facility] [address]     right breast    Cauda equina syndrome ([facility] 12/29/2021    neuropathy, mobility difficulty, incontinance    Cervical stenosis of spine 12/29/2021    GERD (gastroesophageal reflux disease)     History of stomach ulcers     Hypertension     Hypothyroidism     Lumbar neuritis 9/8/2016    Post laminectomy syndrome 9/8/2016    Spinal deformity 11/2021    cervical and lumbar    Thyroid disease         Past Surgical History:     Past Surgical History:   Procedure Laterality Date    BACK SURGERY      x 2    BREAST SURGERY Right     mastectomy with reconstruction    CERVICAL LAMINECTOMY  11/04/2014    cervicle laminectomy c4-c6    HERNIA REPAIR Bilateral     inguinal    HYSTERECTOMY, TOTAL ABDOMINAL      MASTECTOMY, RADICAL          Medications Prior to Admission:     Prior to Admission medications    Medication Sig Start Date End Date Taking?  Authorizing Provider   pantoprazole (PROTONIX) 40 MG tablet TAKE ONE TABLET BY MOUTH DAILY 12/20/21  Yes Celina Carias MD   busPIRone (BUSPAR) 30 MG tablet Take 30 mg by mouth 2 times daily 12/17/21  Yes Celina Carias MD   levothyroxine (SYNTHROID) 88 MCG tablet Take 1 tablet by mouth Daily 12/17/21 1/16/22 Yes Celina Carias MD   dilTIAZem (DILACOR XR) 180 MG extended release capsule Take 1 capsule by mouth daily 12/17/21  Yes Celina Carias MD   tiZANidine (ZANAFLEX) 4 MG tablet Take 4 mg by mouth every 12 hours   Yes Historical Provider, MD   Potassium Gluconate 595 (99 K) MG TABS Take 1 tablet by mouth daily 9/16/21  Yes Celina Carias MD   Vitamin D, Ergocalciferol, 50 MCG (2000 UT) CAPS TAKE ONE CAPSULE BY MOUTH DAILY 3/29/21  Yes Mila Espinoza PA-C   HYDROcodone-acetaminophen [facility]) 7.5-325 MG per tablet Up to three tablets a day. 10/21/15  Yes Historical Provider, MD   methadone (DOLOPHINE) 10 MG tablet Take 10 mg by mouth 2 times daily. Yes Historical Provider, MD        Allergies:     Latex and Kiwi extract    Social History:     Tobacco:    reports that she quit smoking about 8 years ago. She has a 15.00 pack-year smoking history. She has never used smokeless tobacco.  Alcohol:      reports current alcohol use. Drug Use:  reports no history of drug use. Family History:     Family History   Problem Relation Age of Onset    Hypertension Mother     Heart Disease Mother     Cancer Mother        Review of Systems:     Positive and Negative as described in HPI. CONSTITUTIONAL:  negative for fevers, chills, sweats, fatigue, weight loss  HEENT:  negative for vision, hearing changes, runny nose, throat pain  RESPIRATORY:  negative for shortness of breath, cough, congestion, wheezing. CARDIOVASCULAR:  negative for chest pain, palpitations.   GASTROINTESTINAL:  negative for nausea, vomiting, diarrhea, constipation, change in bowel habits, abdominal pain   GENITOURINARY:  negative for difficulty of urination, burning with urination, frequency   INTEGUMENT:  negative for rash, skin lesions, easy bruising   HEMATOLOGIC/LYMPHATIC:  negative for swelling/edema   ALLERGIC/IMMUNOLOGIC:  negative for urticaria , itching  ENDOCRINE:  negative increase in drinking, increase in urination, hot or cold intolerance  MUSCULOSKELETAL:  negative joint pains, muscle aches, swelling of joints  NEUROLOGICAL:  negative for headaches, dizziness, lightheadedness, numbness, pain, tingling extremities  BEHAVIOR/PSYCH:  negative for depression, anxiety    Physical Exam:     BP (!) 140/73   Pulse 97   Temp 98.7 °F (37.1 °C) (Oral)   Resp 20   SpO2 96%   Temp (24hrs), Av.4 °F (36.9 °C), Min:97.9 °F (36.6 °C), Max:98.7 °F (37.1 °C)    Recent Labs     12/30/21  1001   POCGLU 93       Intake/Output Summary (Last 24 hours) at 12/31/2021 1549  Last data filed at 12/31/2021 1454  Gross per 24 hour   Intake 1766 ml   Output 1357 ml   Net 409 ml       General Appearance:  alert, well appearing, and in no acute distress  Mental status: oriented to person, place, and time with normal affect  Head:  normocephalic, atraumatic. Eye: no icterus, redness, pupils equal and reactive, extraocular eye movements intact, conjunctiva clear  Ear: normal external ear, no discharge, hearing intact  Nose:  no drainage noted  Mouth: mucous membranes moist  Neck: supple, no carotid bruits, thyroid not palpable  Lungs: Bilateral equal air entry, clear to ausculation, no wheezing, rales or rhonchi, normal effort  Cardiovascular: normal rate, regular rhythm, no murmur, gallop, rub.   Abdomen: Soft, nontender, nondistended, normal bowel sounds, no hepatomegaly or splenomegaly  Neurologic: There are no new focal motor or sensory deficits, normal muscle tone and bulk, no abnormal sensation, normal speech, cranial nerves II through XII grossly intact  Skin: No gross lesions, rashes, bruising or bleeding on exposed skin area  Extremities:  peripheral pulses palpable, no pedal edema or calf pain with palpation  Psych: normal affect    Investigations:      Laboratory Testing:  Recent Results (from the past 24 hour(s))   CBC WITH AUTO DIFFERENTIAL    Collection Time: 12/31/21  5:35 AM   Result Value Ref Range    WBC 13.4 (H) 3.5 - 11.3 k/uL    RBC 3.20 (L) 3.95 - 5.11 m/uL    Hemoglobin 10.4 (L) 11.9 - 15.1 g/dL    Hematocrit 32.0 (L) 36.3 - 47.1 %    .0 82.6 - 102.9 fL    MCH 32.5 25.2 - 33.5 pg    MCHC 32.5 28.4 - 34.8 g/dL    RDW 12.5 11.8 - 14.4 %    Platelets 847 113 - 625 k/uL    MPV 9.5 8.1 - 13.5 fL    NRBC Automated 0.0 0.0 per 100 WBC    Differential Type NOT REPORTED     WBC Morphology NOT REPORTED     RBC Morphology NOT REPORTED Platelet Estimate NOT REPORTED     Immature Granulocytes 1 (H) 0 %    Seg Neutrophils 93 (H) 36 - 65 %    Lymphocytes 4 (L) 24 - 43 %    Monocytes 2 (L) 3 - 12 %    Eosinophils % 0 (L) 1 - 4 %    Basophils 0 0 - 2 %    Absolute Immature Granulocyte 0.13 0.00 - 0.30 k/uL    Segs Absolute 12.46 (H) 1.50 - 8.10 k/uL    Absolute Lymph # 0.54 (L) 1.10 - 3.70 k/uL    Absolute Mono # 0.27 0.10 - 1.20 k/uL    Absolute Eos # 0.00 0.00 - 0.44 k/uL    Basophils Absolute 0.00 0.00 - 0.20 k/uL    Morphology Normal        Imaging/Diagonstics:  No results found. Assessment :      Hospital Problems           Last Modified POA    Essential hypertension 12/31/2021 Yes    Hypothyroidism 12/31/2021 Yes    Cauda equina compression ([address] 12/30/2021 Yes    Normocytic normochromic anemia 12/31/2021 Yes          Plan:     1. Equina syndrome: Patient presents with progressive lower extremity weakness ultimately wheelchair-bound with severe lumbar stenosis noted on imaging. Patient was evaluated by neurosurgery underwent laminectomy to decompress the thecal sac at L2 L3-L4-L5 and S1. Patient tolerated the procedure well. Management per primary team  2. Hypertension: Restart home antihypertensive regimen  3. GERD: Restart Protonix  4. Hypothyroidism: Restart Synthroid  5. Anemia: Kyle from blood loss. Check iron studies transfuse as needed for symptomatic anemia hemoglobin less than 7  6.  PT OT    Consultations:   IP CONSULT TO INTERNAL MEDICINE  IP CONSULT TO PHYSICAL MEDICINE DO Katharine  12/31/2021  3:49 PM    Copy sent to Dr. Ramsey Vang MD

## 2021-12-31 NOTE — PROGRESS NOTES
Straight cath with non-latex catheter #16-for 500 ml of concentrated indigo urine-post residual cat bladder scan-less than 22ml

## 2021-12-31 NOTE — PROGRESS NOTES
Asked by neurosurgery to evaluate patient for discharge planning-still awaiting therapy notes.   Surgery 12/30

## 2021-12-31 NOTE — PROGRESS NOTES
Dr Alejandro Andersen perfect served - notified no activity order /and bladder scan of [phone]/urgency to urinate  And bladder distention-orders to ambulate-patient ambulated to bathroom with assistance-unable to void -orders received

## 2021-12-31 NOTE — ANESTHESIA POSTPROCEDURE EVALUATION
Department of Anesthesiology  Postprocedure Note    Patient: Jarrell Verdin  MRN: [id number]  YOB: 1960  Date of evaluation: 12/31/2021  Time:  8:56 AM     Procedure Summary     Date: 12/30/21 Room / Location: [address]    Anesthesia Start: 1108 Anesthesia Stop: 4311    Procedure: LUMBAR LAMINECTOMY L2-S1 (N/A Back) Diagnosis: (SPINAL DEFORMITY, CAUDA EQUINA SYNDROME)    Surgeons: Lexx Juan DO Responsible Provider: David Salcedo MD    Anesthesia Type: general ASA Status: 3          Anesthesia Type: general    Nadeem Phase I: Nadeem Score: 7    Nadeem Phase II:      Last vitals: Reviewed and per EMR flowsheets.        Anesthesia Post Evaluation    Patient location during evaluation: PACU  Patient participation: complete - patient participated  Level of consciousness: awake and alert  Airway patency: patent  Nausea & Vomiting: no nausea and no vomiting  Complications: no  Cardiovascular status: blood pressure returned to baseline  Respiratory status: acceptable  Hydration status: euvolemic

## 2022-01-01 PROBLEM — E61.1 IRON DEFICIENCY: Status: ACTIVE | Noted: 2022-01-01

## 2022-01-01 LAB
ABSOLUTE RETIC #: 0.06 M/UL (ref 0.03–0.08)
FERRITIN: 54 UG/L (ref 13–150)
FOLATE: 8.8 NG/ML
IMMATURE RETIC FRACT: 8 % (ref 2.7–18.3)
IRON SATURATION: 11 % (ref 20–55)
IRON: 29 UG/DL (ref 37–145)
RETIC %: 2.2 % (ref 0.5–1.9)
RETIC HEMOGLOBIN: 36.1 PG (ref 28.2–35.7)
TOTAL IRON BINDING CAPACITY: 259 UG/DL (ref 250–450)
UNSATURATED IRON BINDING CAPACITY: 230 UG/DL (ref 112–347)
VITAMIN B-12: 338 PG/ML (ref 232–1245)

## 2022-01-01 PROCEDURE — 6370000000 HC RX 637 (ALT 250 FOR IP): Performed by: STUDENT IN AN ORGANIZED HEALTH CARE EDUCATION/TRAINING PROGRAM

## 2022-01-01 PROCEDURE — 97116 GAIT TRAINING THERAPY: CPT

## 2022-01-01 PROCEDURE — 82746 ASSAY OF FOLIC ACID SERUM: CPT

## 2022-01-01 PROCEDURE — 87086 URINE CULTURE/COLONY COUNT: CPT

## 2022-01-01 PROCEDURE — 51798 US URINE CAPACITY MEASURE: CPT

## 2022-01-01 PROCEDURE — 85045 AUTOMATED RETICULOCYTE COUNT: CPT

## 2022-01-01 PROCEDURE — 6370000000 HC RX 637 (ALT 250 FOR IP): Performed by: INTERNAL MEDICINE

## 2022-01-01 PROCEDURE — 82728 ASSAY OF FERRITIN: CPT

## 2022-01-01 PROCEDURE — 36415 COLL VENOUS BLD VENIPUNCTURE: CPT

## 2022-01-01 PROCEDURE — 1200000000 HC SEMI PRIVATE

## 2022-01-01 PROCEDURE — 83550 IRON BINDING TEST: CPT

## 2022-01-01 PROCEDURE — 82607 VITAMIN B-12: CPT

## 2022-01-01 PROCEDURE — 99232 SBSQ HOSP IP/OBS MODERATE 35: CPT | Performed by: INTERNAL MEDICINE

## 2022-01-01 PROCEDURE — 83540 ASSAY OF IRON: CPT

## 2022-01-01 PROCEDURE — 87077 CULTURE AEROBIC IDENTIFY: CPT

## 2022-01-01 PROCEDURE — 87186 SC STD MICRODIL/AGAR DIL: CPT

## 2022-01-01 PROCEDURE — 97110 THERAPEUTIC EXERCISES: CPT

## 2022-01-01 PROCEDURE — 51701 INSERT BLADDER CATHETER: CPT

## 2022-01-01 PROCEDURE — 6360000002 HC RX W HCPCS: Performed by: STUDENT IN AN ORGANIZED HEALTH CARE EDUCATION/TRAINING PROGRAM

## 2022-01-01 PROCEDURE — 51702 INSERT TEMP BLADDER CATH: CPT

## 2022-01-01 PROCEDURE — 99222 1ST HOSP IP/OBS MODERATE 55: CPT | Performed by: PHYSICAL MEDICINE & REHABILITATION

## 2022-01-01 RX ORDER — SENNA AND DOCUSATE SODIUM 50; 8.6 MG/1; MG/1
2 TABLET, FILM COATED ORAL DAILY
Status: DISCONTINUED | OUTPATIENT
Start: 2022-01-01 | End: 2022-01-04 | Stop reason: HOSPADM

## 2022-01-01 RX ORDER — LANOLIN ALCOHOL/MO/W.PET/CERES
325 CREAM (GRAM) TOPICAL 2 TIMES DAILY WITH MEALS
Status: DISCONTINUED | OUTPATIENT
Start: 2022-01-01 | End: 2022-01-04 | Stop reason: HOSPADM

## 2022-01-01 RX ADMIN — BACLOFEN 10 MG: 10 TABLET ORAL at 23:20

## 2022-01-01 RX ADMIN — METHADONE HYDROCHLORIDE 10 MG: 10 TABLET ORAL at 22:40

## 2022-01-01 RX ADMIN — MAGNESIUM HYDROXIDE 30 ML: 400 SUSPENSION ORAL at 10:48

## 2022-01-01 RX ADMIN — METHADONE HYDROCHLORIDE 10 MG: 10 TABLET ORAL at 08:20

## 2022-01-01 RX ADMIN — DOCUSATE SODIUM 50MG AND SENNOSIDES 8.6MG 2 TABLET: 8.6; 5 TABLET, FILM COATED ORAL at 16:37

## 2022-01-01 RX ADMIN — BETHANECHOL CHLORIDE 10 MG: 5 TABLET ORAL at 14:35

## 2022-01-01 RX ADMIN — BACLOFEN 10 MG: 10 TABLET ORAL at 08:15

## 2022-01-01 RX ADMIN — BETHANECHOL CHLORIDE 10 MG: 5 TABLET ORAL at 22:40

## 2022-01-01 RX ADMIN — DILTIAZEM HYDROCHLORIDE 180 MG: 90 CAPSULE, EXTENDED RELEASE ORAL at 08:16

## 2022-01-01 RX ADMIN — FERROUS SULFATE TAB EC 325 MG (65 MG FE EQUIVALENT) 325 MG: 325 (65 FE) TABLET DELAYED RESPONSE at 16:37

## 2022-01-01 RX ADMIN — LEVOTHYROXINE SODIUM 88 MCG: 88 TABLET ORAL at 06:25

## 2022-01-01 RX ADMIN — ENOXAPARIN SODIUM 40 MG: 100 INJECTION SUBCUTANEOUS at 08:13

## 2022-01-01 RX ADMIN — PANTOPRAZOLE SODIUM 40 MG: 40 TABLET, DELAYED RELEASE ORAL at 08:14

## 2022-01-01 RX ADMIN — BACLOFEN 10 MG: 10 TABLET ORAL at 00:22

## 2022-01-01 RX ADMIN — BETHANECHOL CHLORIDE 10 MG: 5 TABLET ORAL at 08:14

## 2022-01-01 RX ADMIN — BUSPIRONE HYDROCHLORIDE 30 MG: 15 TABLET ORAL at 22:40

## 2022-01-01 RX ADMIN — BUSPIRONE HYDROCHLORIDE 30 MG: 15 TABLET ORAL at 08:13

## 2022-01-01 RX ADMIN — BACLOFEN 10 MG: 10 TABLET ORAL at 14:35

## 2022-01-01 RX ADMIN — OXYCODONE HYDROCHLORIDE 10 MG: 5 TABLET ORAL at 16:37

## 2022-01-01 ASSESSMENT — PAIN SCALES - GENERAL
PAINLEVEL_OUTOF10: 6
PAINLEVEL_OUTOF10: 8
PAINLEVEL_OUTOF10: 8
PAINLEVEL_OUTOF10: 3
PAINLEVEL_OUTOF10: 10
PAINLEVEL_OUTOF10: 8

## 2022-01-01 ASSESSMENT — PAIN DESCRIPTION - ONSET: ONSET: AWAKENED FROM SLEEP

## 2022-01-01 ASSESSMENT — PAIN DESCRIPTION - LOCATION: LOCATION: BACK

## 2022-01-01 ASSESSMENT — PAIN DESCRIPTION - PAIN TYPE: TYPE: SURGICAL PAIN

## 2022-01-01 ASSESSMENT — PAIN DESCRIPTION - DESCRIPTORS: DESCRIPTORS: ACHING;DISCOMFORT

## 2022-01-01 ASSESSMENT — PAIN - FUNCTIONAL ASSESSMENT: PAIN_FUNCTIONAL_ASSESSMENT: PREVENTS OR INTERFERES SOME ACTIVE ACTIVITIES AND ADLS

## 2022-01-01 ASSESSMENT — PAIN DESCRIPTION - FREQUENCY: FREQUENCY: CONTINUOUS

## 2022-01-01 NOTE — PLAN OF CARE
Problem: Pain:  Goal: Pain level will decrease  Description: Pain level will decrease  1/1/2022 1544 by Raven Mejia RN  Outcome: Met This Shift  1/1/2022 0413 by Lucio Ramos RN  Outcome: Ongoing  Goal: Control of acute pain  Description: Control of acute pain  1/1/2022 1544 by Raven Mejia RN  Outcome: Met This Shift  1/1/2022 0413 by Lucio Ramos RN  Outcome: Ongoing  Goal: Control of chronic pain  Description: Control of chronic pain  1/1/2022 1544 by Raven Mejia RN  Outcome: Met This Shift  1/1/2022 0413 by Lucio Ramos RN  Outcome: Ongoing

## 2022-01-01 NOTE — CONSULTS
Physical Medicine & Rehabilitation  Consult Note      Admitting Physician: Estefany Chandler DO    Primary Care Provider: Nayeli Contreras MD     Reason for Consult:  Acute Inpatient Rehabilitation    Chief Complaint: Lumbar laminectomy    History of Present Illness:  Referring Provider is requesting an evaluation for appropriate placement upon discharge from acute care. Ms. Piotr Lechuga is a 62 y.o.  feale who was admitted to [facility] on 12/30/2021 with No chief complaint on file. 61-year-old female with lower extremity weakness, history of hypertension and hypothyroidism as well as reflux. Admitted for lumbar laminectomy-has had history of lumbar spine surgery in past as well cervical spine surgery. He has lower extremity weakness. Difficulty with bowels and bladder. Underwent lumbar laminectomy L2-S1 on 12/30. Internal medicine -cauda equina syndrome with progressive lower extremity weakness ultimately wheelchair-bound with severe lumbar stenosis, underwent laminectomy L2-S1, restart antihypertensives and Protonix and Synthroid checking iron studies for anemia    Neurosurgery-baclofen for spasticity has Bruce catheter, Roxicodone for pain resuming home methadone    Radiology:  MRI lumbar spine 11/20  1. Advanced multilevel degenerative changes with severe spinal canal stenosis   at L2-3, L3-4, and L4-5.  There is moderate spinal canal narrowing at L1-2.   2. Advanced multilevel lateral recess and neural foraminal stenosis as above. 3. Levoscoliosis with anterolisthesis at L3-4.   4. There is mild endplate edema at R2-7 with fluid in the intervertebral disc   space.  This is likely degenerative with Modic type 1 marrow change. Correlation for any signs or symptoms of infection is recommended. MRI thoracic spine 11/20  1. No acute abnormality of the thoracic spine or finding to suggest etiology   of patient's symptoms. 2. Mild multilevel spinal canal stenosis.    3. Mild smooth thoracic dextroscoliosis and 2 mm degenerative anterolisthesis   of T2 on T3. MRI cervical spine 11/20  1. Multilevel degenerative changes in the cervical spine with moderate to   severe spinal canal stenosis at C3-4, moderate spinal canal stenosis at C4-5,   and mild to moderate spinal canal stenosis at C5-6 and C7-T1.   2. Multilevel neural foraminal narrowing as above. 3. Areas of hyperintensity in the cord at C4-5 and C6, likely myelomalacia. 4. Grade 1 anterolisthesis at C3-4 and C4-5. Review of Systems:  Constitutional: negative for anorexia, chills, fatigue, fevers, sweats and weight loss  Eyes: negative for redness and visual disturbance  Ears, nose, mouth, throat, and face: negative for earaches, sore throat and tinnitus  Respiratory: negative for cough and shortness of breath  Cardiovascular: negative for chest pain, dyspnea and palpitations  Gastrointestinal: negative for abdominal pain, change in bowel habits, constipation, nausea and vomiting  Genitourinary:negative for dysuria, frequency, hesitancy and urinary incontinence  Integument/breast: negative for pruritus and rash  Musculoskeletal:negative for muscle weakness and stiff joints  Neurological: negative for dizziness, headaches and weakness  Behavioral/Psych: negative for decreased appetite, depression and fatigue    Functional History:  PTA: Independent with all activities. Current:  PT:  Restrictions/Precautions: Fall Risk  Other position/activity restrictions: activity as tolerated   Transfers  Sit to Stand: Minimal Assistance  Stand to sit: Minimal Assistance  Comment: Verbal cues for hand placement with good return. Ambulation 1  Surface: level tile  Device: Rolling Walker  Assistance: Contact guard assistance  Quality of Gait: unsteady, 1x muscle spasm RLE, decreased gait speed, no true LOB  Gait Deviations: Slow Sharon,Staggers,Shuffles  Distance: 25 ft.           OT:   ADL  Feeding: Modified independent   Grooming: Modified independent   UE Bathing: Stand by assistance  LE Bathing: Moderate assistance  UE Dressing: Stand by assistance  LE Dressing: Moderate assistance  Toileting: Moderate assistance  Additional Comments: OT failitated pt in donning gown around back while sitting on EOB with SBA for safety and line management      ST:        Past Medical History:        Diagnosis Date    Anxiety     Arthritis     At maximum risk for fall 12/29/2021    caudal equina syndrome and cervical stenosis    Cancer ([address]     right breast    Cauda equina syndrome ([address] 12/29/2021    neuropathy, mobility difficulty, incontinance    Cervical stenosis of spine 12/29/2021    GERD (gastroesophageal reflux disease)     History of stomach ulcers     Hypertension     Hypothyroidism     Lumbar neuritis 9/8/2016    Post laminectomy syndrome 9/8/2016    Spinal deformity 11/2021    cervical and lumbar    Thyroid disease        Past Surgical History:        Procedure Laterality Date    BACK SURGERY      x 2    BREAST SURGERY Right     mastectomy with reconstruction    CERVICAL LAMINECTOMY  11/04/2014    cervicle laminectomy c4-c6    HERNIA REPAIR Bilateral     inguinal    HYSTERECTOMY, TOTAL ABDOMINAL      MASTECTOMY, RADICAL         Allergies:     Allergies   Allergen Reactions    Latex Hives, Itching, Dermatitis and Rash    Kiwi Extract      Other reaction(s): Facial Swelling        Current Medications:   Current Facility-Administered Medications: bisacodyl (DULCOLAX) EC tablet 5 mg, 5 mg, Oral, Daily PRN  bethanechol (URECHOLINE) tablet 10 mg, 10 mg, Oral, TID  methadone (DOLOPHINE) tablet 10 mg, 10 mg, Oral, BID  enoxaparin (LOVENOX) injection 40 mg, 40 mg, SubCUTAneous, Daily  busPIRone (BUSPAR) tablet 30 mg, 30 mg, Oral, BID  dilTIAZem (CARDIZEM 12 HR) extended release capsule 180 mg, 180 mg, Oral, Daily  levothyroxine (SYNTHROID) tablet 88 mcg, 88 mcg, Oral, Daily  pantoprazole (PROTONIX) tablet 40 mg, 40 mg, Oral, Daily  baclofen (LIORESAL) tablet 10 mg, 10 mg, Oral, TID  EXPAREL 20ml mixed with 20ml of normal saline, 20 mL, Infiltration, Once  oxyCODONE (ROXICODONE) immediate release tablet 5 mg, 5 mg, Oral, Q4H PRN  oxyCODONE (ROXICODONE) immediate release tablet 10 mg, 10 mg, Oral, Q4H PRN    Social History:  Social History     Socioeconomic History    Marital status: Single     Spouse name: Not on file    Number of children: Not on file    Years of education: Not on file    Highest education level: Not on file   Occupational History    Not on file   Tobacco Use    Smoking status: Former Smoker     Packs/day: 0.50     Years: 30.00     Pack years: 15.00     Quit date:      Years since quittin.0    Smokeless tobacco: Never Used   Vaping Use    Vaping Use: Every day    Substances: Nicotine   Substance and Sexual Activity    Alcohol use: Yes     Alcohol/week: 0.0 standard drinks     Comment: 3 whiskey drinks per day    Drug use: No    Sexual activity: Not on file   Other Topics Concern    Not on file   Social History Narrative    Not on file     Social Determinants of Health     Financial Resource Strain: Low Risk     Difficulty of Paying Living Expenses: Not very hard   Food Insecurity: No Food Insecurity    Worried About Running Out of Food in the Last Year: Never true    Rodriguez of Food in the Last Year: Never true   Transportation Needs: No Transportation Needs    Lack of Transportation (Medical): No    Lack of Transportation (Non-Medical):  No   Physical Activity:     Days of Exercise per Week: Not on file    Minutes of Exercise per Session: Not on file   Stress:     Feeling of Stress : Not on file   Social Connections:     Frequency of Communication with Friends and Family: Not on file    Frequency of Social Gatherings with Friends and Family: Not on file    Attends Faith Services: Not on file    Active Member of Clubs or Organizations: Not on file    Attends Club or Organization Meetings: Not on file    Marital Status: Not on file   Intimate Partner Violence:     Fear of Current or Ex-Partner: Not on file    Emotionally Abused: Not on file    Physically Abused: Not on file    Sexually Abused: Not on file   Housing Stability:     Unable to Pay for Housing in the Last Year: Not on file    Number of [place] in the Last Year: Not on file    Unstable Housing in the Last Year: Not on file     Social/Functional History  Lives With: Significant other  Type of Home: [facility] WORKING OUT WOR[employer] [facility]: Two level,Able to Live on Main level with bedroom/bathroom  Home Access: Stairs to enter without rails  Entrance Stairs - Number of Steps: 1  Bathroom Shower/Tub: Tub/Shower unit  Bathroom Toilet: Standard  Bathroom Equipment: Toilet raiser  Home Equipment: Rolling walker,4 wheeled walker (pt reported using RW majority of time)  ADL Assistance: Independent (pt reported occasionally requiring assistance for donning shirt.  pt reported signficantother assists with getting in/out shower)  Homemaking Assistance: Needs assistance  Homemaking Responsibilities: Yes (pt reported splitting with IADLs)  Meal Prep Responsibility: Secondary  Laundry Responsibility: Secondary  Cleaning Responsibility: Secondary  Ambulation Assistance: Independent  Transfer Assistance: Independent  Active : No (pt requires assistance for getting into truck)  Patient's  Info: significant other drives  Mode of Transportation: Truck  Occupation: On disability  Leisure & Hobbies: watch TV  Additional Comments: pt reported significant other works but able to assist PRN when home       Family History:       Problem Relation Age of Onset    Hypertension Mother     Heart Disease Mother     Cancer Mother            Physical Exam:    /82   Pulse 104   Temp 99 °F (37.2 °C) (Oral)   Resp 18   SpO2 95%     General appearance: alert, appears stated age, cooperative, and no distress  HEENT: Normocephalic, without obvious abnormality, atraumatic               Eyes: conjunctivae clear. Throat: tongue normal.               Neck:  symmetrical, trachea midline. Pulm: clear to auscultation bilaterally. Cardiac: regular rate and rhythm, no murmur. Abdomen: soft, non-tender; bowel sounds normal.  MSK: extremities normal, atraumatic, no edema, normal tone. ROM: Functional range of motion upper and distal lower extremity  Mental status/Psych: Alert, orientedX3, thought content appropriate. Skin:     Neuro:    Sensory: Notes decreased sensation to mid thigh though improving  Motor: Muscle tone and bulk are normal bilaterally. No pronator drift. 4+/5 upper and distal lower extremities limited proximally  Coordination: finger to nose normal bilaterally. Diagnostics:  CBC   Lab Results   Component Value Date    WBC 13.4 12/31/2021    RBC 3.20 12/31/2021    HGB 10.4 12/31/2021    HCT 32.0 12/31/2021    .0 12/31/2021    RDW 12.5 12/31/2021     12/31/2021     BMP    Lab Results   Component Value Date     11/22/2021    K 4.3 11/22/2021     11/22/2021    CO2 25 11/22/2021    BUN 13 11/22/2021     Uric Acid  No components found for: URIC  VITAMIN B12 No components found for: B12  PT/INR  No results found for: PTINR      Impression: Ms. Drew Antonio is a 62 y.o. female with a history of <principal problem not specified>    1. Lumbar laminectomy L2-S1 due to possible cauda equina syndrome  2. Spasticity-baclofen  3. Hypertension-diltiazem  4. Hypothyroidism-levothyroxine  5. Reflux Protonix  6. History of lumbar spine surgeries and cervical spine surgery postlaminectomy syndrome/chronic pain-Roxicodone, methadone  7. History of radical mastectomy  8. Depression-BuSpar  9. -Urecholine    Recommendations:  1. Diagnosis: Lumbar laminectomy secondary possible cauda equina syndrome  2. Therapy: Has PT and OT needs  3. Medical  Necessity: As above  4. Support: Clarify, per notes has significant other  5.  Rehab

## 2022-01-01 NOTE — CARE COORDINATION
Case Management Initial Discharge Plan  Mary Fermin,             Met with:patient to discuss discharge plans. Information verified: address, contacts, phone number, , insurance Yes  Insurance Provider: Medicare    Emergency Contact/Next of Kin name & number: Malachi Meneses (S.O.) [phone] [facility]), [phone] (cell)  Who are involved in patient's support system? S.O.    PCP: Rosa Marquez MD  Date of last visit: 1 week ago      Discharge Planning    Living Arrangements:  Spouse/Significant Other     Home has 2 stories  1 stairs to climb to get into front door, does not use upstairs stairs to climb to reach second floor  Location of bedroom/bathroom in home 1st floor    Patient able to perform ADL's:Independent    Current Services (outpatient & in home) none  DME equipment: walker  DME provider:     Is patient receiving oral anticoagulation therapy? No    If indicated:   Physician managing anticoagulation treatment: n/a  Where does patient obtain lab work for ATC treatment? n/a      Potential Assistance Needed:       Patient agreeable to home care: Yes  Freedom of choice provided:  yes    Prior SNF/Rehab Placement and Facility: none  Agreeable to SNF/Rehab: Yes, if needed  Freedom of choice provided: yes     Evaluation: n/a    Expected Discharge date:       Patient expects to be discharged to: If home: is the family and/or caregiver wiling & able to provide support at home? Yes, OMAR Pritchett works during the day  Who will be providing this support? S.O. Follow Up Appointment: Best Day/ Time:      Transportation provider: S.O. if home  Transportation arrangements needed for discharge: Yes if pt goes to rehab    Readmission Risk              Risk of Unplanned Readmission:  11             Does patient have a readmission risk score greater than 14?: No  If yes, follow-up appointment must be made within 7 days of discharge.      Goals of Care: to walk independently      Educated patient on transitional options, provided freedom of choice and are agreeable with plan      Discharge Plan: Prefers to go home with home care for therapy. Pt is agreeable to inpatient therapy if absolutely necessary. Freedom of choice given, she chooses [facility].   Referral sent to [address] [place] [address]          Electronically signed by Raheem Hernandez RN on 1/1/22 at 12:24 PM EST

## 2022-01-01 NOTE — PROGRESS NOTES
Physical Therapy  Facility/Department: [address] ORTHO/MED SURG  Daily Treatment Note  NAME: Zoë Martinez  : 1960  MRN: [id number]    Date of Service: 2022    Discharge Recommendations:  Patient would benefit from continued therapy after discharge   PT Equipment Recommendations  Equipment Needed: No    Assessment   Assessment: Pt with mobility deficits requiring mod-A for bed mobility and CGA to perform sit<>stand transfer. Pt required CGA to ambulate 20 feet with a RW. Pt is a fall risk this date secondary to decreased standing balance, decreased endurance, and significant pain with ambulation. Pt would be unsafe to return to prior living arrangements upon discharge. Pt would benefit from additional intense therapy upon discharge to assist in return to prior level of functional independence. Prognosis: Good  Decision Making: Medium Complexity  PT Education: Goals;PT Role;Plan of Care;Gait Training;Transfer Training;Functional Mobility Training  REQUIRES PT FOLLOW UP: Yes  Activity Tolerance  Activity Tolerance: Patient limited by pain; Patient Tolerated treatment well     Patient Diagnosis(es): The encounter diagnosis was Cauda equina compression ([address] has a past medical history of Anxiety, Arthritis, At maximum risk for fall, Cancer ([address] Cauda equina syndrome (HCC), Cervical stenosis of spine, GERD (gastroesophageal reflux disease), History of stomach ulcers, Hypertension, Hypothyroidism, Lumbar neuritis, Post laminectomy syndrome, Spinal deformity, and Thyroid disease. has a past surgical history that includes back surgery; hernia repair (Bilateral); Breast surgery (Right); cervical laminectomy (2014);  Mastectomy, radical; and Hysterectomy, total abdominal.    Restrictions  Restrictions/Precautions  Restrictions/Precautions: Fall Risk  Required Braces or Orthoses?: No  Position Activity Restriction  Other position/activity restrictions: activity as tolerated  Subjective   General  Chart Reviewed: Yes  Response To Previous Treatment: Patient with no complaints from previous session. Family / Caregiver Present: No  Subjective  Subjective: Pt supine in bed and agreeable to therapy, RN agreeable to therapy. Pt pleasant and cooperative throughout today's session. Pain Screening  Patient Currently in Pain: Yes  Pain Assessment  Pain Assessment: 0-10  Pain Level: 8  Vital Signs  Patient Currently in Pain: Yes       Orientation  Orientation  Overall Orientation Status: Within Functional Limits  Cognition   Cognition  Overall Cognitive Status: WFL  Objective   Bed mobility  Supine to Sit: Moderate assistance  Sit to Supine: Moderate assistance  Scooting: Moderate assistance  Transfers  Sit to Stand: Minimal Assistance  Stand to sit: Minimal Assistance  Ambulation  Ambulation?: Yes  More Ambulation?: No  Ambulation 1  Surface: level tile  Device: Rolling Walker  Assistance: Contact guard assistance  Quality of Gait: unsteady, 1x muscle spasm RLE, decreased gait speed, no true LOB  Gait Deviations: Slow Sharon;Staggers; Shuffles  Distance: 25 ft. Stairs/Curb  Stairs?: No     Balance  Posture: Fair  Sitting - Static: Good;-  Sitting - Dynamic: Fair  Standing - Static: Fair  Standing - Dynamic: Fair;-  Comments: standing balance assessed while using a RW  Exercises  Straight Leg Raise: x10 BLE  Heelslides: x10 BLE  Hip Flexion: x10 BLE  Knee Long Arc Quad: x10 BLE  Ankle Pumps: x10 BLE  Other exercises  Other exercises?: No                                        AM-PAC Score  AM-PAC Inpatient Mobility Raw Score : 14 (01/01/22 1151)  AM-PAC Inpatient T-Scale Score : 38.1 (01/01/22 1151)  Mobility Inpatient CMS 0-100% Score: 61.29 (01/01/22 1151)  Mobility Inpatient CMS G-Code Modifier : CL (01/01/22 1151)          Goals  Short term goals  Time Frame for Short term goals: 14 visits  Short term goal 1: Pt will ambulate 200 feet with a RW and SBA to increase functional independence.   Short term goal 2: Pt will tolerate a 35 minute therapy session to promote increased endurance. Short term goal 3: Pt will demonstrate good- standing balance to decrease fall risk. Short term goal 4: Pt will perform sit<>stand transfer with supervision to increase functional independence. Short term goal 5: Pt will negotiate 1 stair with no handrail and SBA to allow the pt to enter prior living arrangements.     Plan    Plan  Times per week: 5-6x  Times per day: Daily  Current Treatment Recommendations: Strengthening,ROM,Stair training,Balance Training,Gait Training,Patient/Caregiver Education & Training,Equipment Evaluation, Education, & procurement,Neuromuscular Re-education,Functional Mobility Training,Endurance Training,Transfer Training,Safety Education & Training,Home Exercise Program  Safety Devices  Type of devices: Left in bed,Call light within reach,Gait belt,Patient at risk for falls  Restraints  Initially in place: No     Therapy Time   Individual Concurrent Group Co-treatment   Time In 1120         Time Out 1144         Minutes 24         Timed Code Treatment Minutes: [address], PTA

## 2022-01-01 NOTE — PROGRESS NOTES
[facility]  Office: [address], DO, Renee Collins, DO, Aljack Hodge, DO, Alejandro Donmiya Blood, DO, Gustavo Hancock MD, Judit Burciaga MD, Gregg Harden MD, Ilene Jordan MD, Selvin Estrella MD, Robby Mendoza MD, Brittney Vazquez MD, Gautam Oliveira, DO, Lambert Avendaño, DO, Hetal Sam MD,  Johann Galeazzi, DO, Albin Santos MD, Meme Nichols MD, Norris Salamanca MD, Suhas Seymour MD, Caterina Canas MD, Evans Ray MD, Jennifer Elias MD, Robert Hernandez [facility], [facility], CNP, Camilo Odell, CNP, Jahaira Reyes, CNS, Mitch Zaman, Prince Singh, CNP, Joyce Parker, CNP, Derek Jones, CNP, Gypsy Remy, CNP, Bebe Herrera PA-C, Ana Doty, HUMAIRA, Bao Prieto, [facility], Trupti Esteban, CNP, Janette Andrews, CNP, Jean-Claude Maxwell, CNP, Abigail Red, CNP, Rigo Vergara, CNP, [facility], [address] [place] [address]    Progress Note    1/1/2022    2:38 PM    Name:   Clive Burton  MRN:     [id number]     [place]:      [de-identified]   Room:   [address] [place] [address] Day:  2  Admit Date:  12/30/2021  8:12 AM    PCP:   Emily Lemon MD  Code Status:  Prior    Subjective:     C/C: Cauda equina compression    Interval History Status: improved. Patient's pain is 6/10. She is receiving Roxicodone for pain. She is also c/o constipation. Her strength and sensation are improving in her legs. She has been up to walk several times today. Her significant other is in the room. Brief History:     Per my partner: \"This is a 11year-old female who presented to the hospital with complaints of lower extremity weakness. Internal medicine was consulted for medical management. Patient has history of hypertension is on Cardizem. Also has a history of hypothyroidism is on Synthroid. Patient has a history of GERD and is on Protonix. Denies any other significant medical history. Patient denies any fevers, chills, nausea, vomiting, diarrhea.   Denies any history of diabetes. Denies any history of atrial fibrillation/CHF/CAD. Patient tolerated surgery well. He is eating, drinking without issue. Pain is controlled. He feels like her strength in her lower extremity is much better than before. \"    Review of Systems:     Constitutional:  negative for chills, fevers, sweats  Respiratory:  negative for cough, dyspnea on exertion, shortness of breath, wheezing  Cardiovascular:  negative for chest pain, chest pressure/discomfort, lower extremity edema, palpitations  Gastrointestinal:  negative for abdominal pain, , diarrhea, nausea, vomiting, +constipation  Neurological:  negative for dizziness, headache    Medications: Allergies: Allergies   Allergen Reactions    Latex Hives, Itching, Dermatitis and Rash    Kiwi Extract      Other reaction(s): Facial Swelling       Current Meds:   Scheduled Meds:    bethanechol  10 mg Oral TID    methadone  10 mg Oral BID    enoxaparin  40 mg SubCUTAneous Daily    busPIRone  30 mg Oral BID    dilTIAZem  180 mg Oral Daily    levothyroxine  88 mcg Oral Daily    pantoprazole  40 mg Oral Daily    baclofen  10 mg Oral TID    bupivacaine liposome  20 mL Infiltration Once     Continuous Infusions:   PRN Meds: bisacodyl, oxyCODONE, oxyCODONE    Data:     Past Medical History:   has a past medical history of Anxiety, Arthritis, At maximum risk for fall, Cancer (HCC), Cauda equina syndrome (HCC), Cervical stenosis of spine, GERD (gastroesophageal reflux disease), History of stomach ulcers, Hypertension, Hypothyroidism, Lumbar neuritis, Post laminectomy syndrome, Spinal deformity, and Thyroid disease. Social History:   reports that she quit smoking about 8 years ago. She has a 15.00 pack-year smoking history. She has never used smokeless tobacco. She reports current alcohol use. She reports that she does not use drugs.      Family History:   Family History   Problem Relation Age of Onset    Hypertension Mother     Heart Disease Mother     Cancer Mother        Vitals:  /82   Pulse 104   Temp 99 °F (37.2 °C) (Oral)   Resp 18   SpO2 95%   Temp (24hrs), Av.6 °F (37 °C), Min:98.1 °F (36.7 °C), Max:99 °F (37.2 °C)    Recent Labs     21  1001   POCGLU 93       I/O (24Hr): Intake/Output Summary (Last 24 hours) at 2022 1438  Last data filed at 2022 1406  Gross per 24 hour   Intake 900 ml   Output 1958 ml   Net -1058 ml       Labs:  Hematology:  Recent Labs     21  0535   WBC 13.4*   RBC 3.20*   HGB 10.4*   HCT 32.0*   .0   MCH 32.5   MCHC 32.5   RDW 12.5      MPV 9.5     Chemistry:  Recent Labs     21  1001   CREATININE 0.79   LABGLOM >60     Recent Labs     21  1001   POCGLU 93     ABG:No results found for: POCPH, PHART, PH, POCPCO2, WYW7JRE, PCO2, POCPO2, PO2ART, PO2, POCHCO3, TFO8ABP, HCO3, NBEA, PBEA, BEART, BE, THGBART, THB, JSX6MQM, YUCW0TZY, S5VWOPPG, O2SAT, FIO2  No results found for: SPECIAL  No results found for: CULTURE    Radiology:  No results found. Physical Examination:        General appearance:  alert, cooperative and no distress  Mental Status:  oriented to person, place and time and normal affect  Lungs:  clear to auscultation bilaterally, normal effort  Heart:  regular rate and rhythm, no murmur  Abdomen:  soft, nontender, + distended, hypoactive bowel sounds, no masses, hepatomegaly, splenomegaly  Extremities:  no edema, redness, tenderness in the calves  Skin:  no gross lesions, rashes, induration    Assessment:        Hospital Problems           Last Modified POA    Essential hypertension 2021 Yes    Hypothyroidism 2021 Yes    Cauda equina compression ([address] 2021 Yes    Normocytic normochromic anemia 2021 Yes    Iron deficiency 2022 Yes          Plan:        1. Cauda equina compression- s/p L2-S1 laminectomy per NS, pain controlled. Drain still in place  2. Urinary retention- mario catheter placed this morning  3.  Anemia-

## 2022-01-01 NOTE — PROGRESS NOTES
Neurosurgery Service  Resident Daily Progress Note   1/1/2022 7:41 AM     Subjective    Patient seen and examined at the bedside. Discussed with nursing staff. Chart reviewed. No any acute event overnight.  ml. straight cath 700 ml       Drain: 55 mL over night     Vitals:    12/31/21 1530 12/31/21 1730 12/31/21 1900 12/31/21 2034   BP: (!) 140/73   (!) 153/91   Pulse: 93 104 89 102   Resp: 16 20 13 17   Temp:    98.1 °F (36.7 °C)   TempSrc:    Oral   SpO2: 95% 94% 92% 94%       Physical Exam:  Gen: Resting comfortably, no acute distress  CV: RRR  Resp: CTAB  GI: Abdomen soft, non-tender  Skin: Cap refill< 2 seconds, surgical incision  C/D/I    Neuro:      A&Ox3   PERRL, EOMI   CNII-XII intact     Normal speech and mentation      L deltoid 5/5; R deltoid 5/5  L biceps 5/5; R biceps 5/5  L triceps 5/5; R triceps 5/5  L wrist extension 5/5; R wrist extension 5/5  L intrinsics 5/5; R intrinsics 5/5         L iliopsoas 4/5 , R iliopsoas 5/5  L quadriceps 4/5; R quadriceps 5/5  L Dorsiflexion 4/5; R dorsiflexion 4+/5  L Plantarflexion 4/5; R plantarflexion 4+/5       Labs:  Lab Results   Component Value Date    WBC 13.4 (H) 12/31/2021    HGB 10.4 (L) 12/31/2021    HCT 32.0 (L) 12/31/2021     12/31/2021    CHOL 198 07/23/2020    TRIG 96 07/23/2020     (A) 07/23/2020     11/22/2021    K 4.3 11/22/2021     11/22/2021    CREATININE 0.79 12/30/2021    BUN 13 11/22/2021    CO2 25 11/22/2021    GLUF 109 07/23/2020       Radiology (last 24 hours):   No new studies    ASSESSMENT & PLAN:    Berenda Goltz is a 62 y.o. female who presents with decline in the mobility with complain of perineal and genital  numbness      Spinal deformity, Cauda Equina syndrome    POD# 2 S/p Lumbar Laminectomy L2-S1    -Baclofen 10 mg 3 times daily  -Bruce catheter   -Uticort 10 mg 4 times daily  -Roxicodone 5-10 as needed  -Resume home dose methadone  -PMR consult  -Lovenox DVT prophylaxis  -Diet: General  -Incentive spirometry  - Neuro checks per floor protocol  - Additional recommendations may follow      Please contact neurosurgery with any changes in patient's neurologic status.       Sofi Garnett MD  PGY-3 Neurology Resident Physician  Neurosurgery/Neuro Critical Care Team  Pager [phone]

## 2022-01-02 ENCOUNTER — APPOINTMENT (OUTPATIENT)
Dept: GENERAL RADIOLOGY | Age: 62
DRG: 029 | End: 2022-01-02
Attending: NEUROLOGICAL SURGERY
Payer: MEDICARE

## 2022-01-02 PROBLEM — N39.0 PSEUDOMONAS URINARY TRACT INFECTION: Status: ACTIVE | Noted: 2022-01-02

## 2022-01-02 PROBLEM — B96.5 PSEUDOMONAS URINARY TRACT INFECTION: Status: ACTIVE | Noted: 2022-01-02

## 2022-01-02 PROBLEM — E83.51 HYPOCALCEMIA: Status: ACTIVE | Noted: 2022-01-02

## 2022-01-02 LAB
ANION GAP SERPL CALCULATED.3IONS-SCNC: 13 MMOL/L (ref 9–17)
BUN BLDV-MCNC: 13 MG/DL (ref 8–23)
BUN/CREAT BLD: ABNORMAL (ref 9–20)
CALCIUM SERPL-MCNC: 8.3 MG/DL (ref 8.6–10.4)
CHLORIDE BLD-SCNC: 100 MMOL/L (ref 98–107)
CO2: 25 MMOL/L (ref 20–31)
CREAT SERPL-MCNC: 0.62 MG/DL (ref 0.5–0.9)
CULTURE: ABNORMAL
GFR AFRICAN AMERICAN: >60 ML/MIN
GFR NON-AFRICAN AMERICAN: >60 ML/MIN
GFR SERPL CREATININE-BSD FRML MDRD: ABNORMAL ML/MIN/{1.73_M2}
GFR SERPL CREATININE-BSD FRML MDRD: ABNORMAL ML/MIN/{1.73_M2}
GLUCOSE BLD-MCNC: 102 MG/DL (ref 70–99)
HCT VFR BLD CALC: 30.5 % (ref 36.3–47.1)
HEMOGLOBIN: 9.7 G/DL (ref 11.9–15.1)
Lab: ABNORMAL
MCH RBC QN AUTO: 32.2 PG (ref 25.2–33.5)
MCHC RBC AUTO-ENTMCNC: 31.8 G/DL (ref 28.4–34.8)
MCV RBC AUTO: 101.3 FL (ref 82.6–102.9)
NRBC AUTOMATED: 0 PER 100 WBC
PDW BLD-RTO: 12.9 % (ref 11.8–14.4)
PLATELET # BLD: 234 K/UL (ref 138–453)
PMV BLD AUTO: 9.6 FL (ref 8.1–13.5)
POTASSIUM SERPL-SCNC: 3.8 MMOL/L (ref 3.7–5.3)
RBC # BLD: 3.01 M/UL (ref 3.95–5.11)
SODIUM BLD-SCNC: 138 MMOL/L (ref 135–144)
SPECIMEN DESCRIPTION: ABNORMAL
WBC # BLD: 10.1 K/UL (ref 3.5–11.3)

## 2022-01-02 PROCEDURE — 6370000000 HC RX 637 (ALT 250 FOR IP): Performed by: STUDENT IN AN ORGANIZED HEALTH CARE EDUCATION/TRAINING PROGRAM

## 2022-01-02 PROCEDURE — 85027 COMPLETE CBC AUTOMATED: CPT

## 2022-01-02 PROCEDURE — 2580000003 HC RX 258: Performed by: INTERNAL MEDICINE

## 2022-01-02 PROCEDURE — 6370000000 HC RX 637 (ALT 250 FOR IP): Performed by: INTERNAL MEDICINE

## 2022-01-02 PROCEDURE — 80048 BASIC METABOLIC PNL TOTAL CA: CPT

## 2022-01-02 PROCEDURE — 6360000002 HC RX W HCPCS: Performed by: INTERNAL MEDICINE

## 2022-01-02 PROCEDURE — 1200000000 HC SEMI PRIVATE

## 2022-01-02 PROCEDURE — 6360000002 HC RX W HCPCS: Performed by: STUDENT IN AN ORGANIZED HEALTH CARE EDUCATION/TRAINING PROGRAM

## 2022-01-02 PROCEDURE — 99232 SBSQ HOSP IP/OBS MODERATE 35: CPT | Performed by: INTERNAL MEDICINE

## 2022-01-02 PROCEDURE — 74018 RADEX ABDOMEN 1 VIEW: CPT

## 2022-01-02 PROCEDURE — 36415 COLL VENOUS BLD VENIPUNCTURE: CPT

## 2022-01-02 RX ORDER — SENNA PLUS 8.6 MG/1
1 TABLET ORAL NIGHTLY
Status: DISCONTINUED | OUTPATIENT
Start: 2022-01-02 | End: 2022-01-02

## 2022-01-02 RX ORDER — BETHANECHOL CHLORIDE 25 MG/1
25 TABLET ORAL EVERY 6 HOURS
Status: DISCONTINUED | OUTPATIENT
Start: 2022-01-02 | End: 2022-01-04 | Stop reason: HOSPADM

## 2022-01-02 RX ORDER — POLYETHYLENE GLYCOL 3350 17 G/17G
17 POWDER, FOR SOLUTION ORAL 2 TIMES DAILY
Status: DISCONTINUED | OUTPATIENT
Start: 2022-01-02 | End: 2022-01-04 | Stop reason: HOSPADM

## 2022-01-02 RX ADMIN — METHADONE HYDROCHLORIDE 10 MG: 10 TABLET ORAL at 08:45

## 2022-01-02 RX ADMIN — DILTIAZEM HYDROCHLORIDE 180 MG: 90 CAPSULE, EXTENDED RELEASE ORAL at 08:31

## 2022-01-02 RX ADMIN — PANTOPRAZOLE SODIUM 40 MG: 40 TABLET, DELAYED RELEASE ORAL at 08:32

## 2022-01-02 RX ADMIN — PIPERACILLIN AND TAZOBACTAM 3375 MG: 3; .375 INJECTION, POWDER, FOR SOLUTION INTRAVENOUS at 22:15

## 2022-01-02 RX ADMIN — FERROUS SULFATE TAB EC 325 MG (65 MG FE EQUIVALENT) 325 MG: 325 (65 FE) TABLET DELAYED RESPONSE at 08:31

## 2022-01-02 RX ADMIN — Medication 15 ML: at 17:38

## 2022-01-02 RX ADMIN — FERROUS SULFATE TAB EC 325 MG (65 MG FE EQUIVALENT) 325 MG: 325 (65 FE) TABLET DELAYED RESPONSE at 17:38

## 2022-01-02 RX ADMIN — PIPERACILLIN AND TAZOBACTAM 3375 MG: 3; .375 INJECTION, POWDER, FOR SOLUTION INTRAVENOUS at 13:15

## 2022-01-02 RX ADMIN — BETHANECHOL CHLORIDE 25 MG: 25 TABLET ORAL at 15:00

## 2022-01-02 RX ADMIN — BETHANECHOL CHLORIDE 25 MG: 25 TABLET ORAL at 22:14

## 2022-01-02 RX ADMIN — LEVOTHYROXINE SODIUM 88 MCG: 88 TABLET ORAL at 06:09

## 2022-01-02 RX ADMIN — ENOXAPARIN SODIUM 40 MG: 100 INJECTION SUBCUTANEOUS at 08:30

## 2022-01-02 RX ADMIN — POLYETHYLENE GLYCOL 3350 17 G: 17 POWDER, FOR SOLUTION ORAL at 22:15

## 2022-01-02 RX ADMIN — DOCUSATE SODIUM 50MG AND SENNOSIDES 8.6MG 2 TABLET: 8.6; 5 TABLET, FILM COATED ORAL at 08:32

## 2022-01-02 RX ADMIN — OXYCODONE HYDROCHLORIDE 10 MG: 5 TABLET ORAL at 05:50

## 2022-01-02 RX ADMIN — BACLOFEN 10 MG: 10 TABLET ORAL at 08:31

## 2022-01-02 RX ADMIN — BETHANECHOL CHLORIDE 10 MG: 5 TABLET ORAL at 08:31

## 2022-01-02 RX ADMIN — BUSPIRONE HYDROCHLORIDE 30 MG: 15 TABLET ORAL at 08:31

## 2022-01-02 RX ADMIN — BUSPIRONE HYDROCHLORIDE 30 MG: 15 TABLET ORAL at 22:14

## 2022-01-02 RX ADMIN — POLYETHYLENE GLYCOL 3350 17 G: 17 POWDER, FOR SOLUTION ORAL at 12:47

## 2022-01-02 RX ADMIN — METHADONE HYDROCHLORIDE 10 MG: 10 TABLET ORAL at 23:24

## 2022-01-02 RX ADMIN — Medication 15 ML: at 22:15

## 2022-01-02 RX ADMIN — Medication 15 ML: at 12:37

## 2022-01-02 ASSESSMENT — PAIN DESCRIPTION - PROGRESSION
CLINICAL_PROGRESSION: GRADUALLY IMPROVING

## 2022-01-02 ASSESSMENT — PAIN SCALES - GENERAL
PAINLEVEL_OUTOF10: 7
PAINLEVEL_OUTOF10: 7
PAINLEVEL_OUTOF10: 9
PAINLEVEL_OUTOF10: 7
PAINLEVEL_OUTOF10: 0

## 2022-01-02 ASSESSMENT — PAIN - FUNCTIONAL ASSESSMENT: PAIN_FUNCTIONAL_ASSESSMENT: PREVENTS OR INTERFERES SOME ACTIVE ACTIVITIES AND ADLS

## 2022-01-02 ASSESSMENT — PAIN DESCRIPTION - PAIN TYPE
TYPE: SURGICAL PAIN
TYPE: SURGICAL PAIN

## 2022-01-02 ASSESSMENT — PAIN DESCRIPTION - LOCATION
LOCATION: BACK
LOCATION: BACK

## 2022-01-02 ASSESSMENT — PAIN DESCRIPTION - DESCRIPTORS: DESCRIPTORS: ACHING;DISCOMFORT

## 2022-01-02 ASSESSMENT — ENCOUNTER SYMPTOMS
CONSTIPATION: 0
COUGH: 0
BACK PAIN: 1
NAUSEA: 0
SHORTNESS OF BREATH: 0
VOMITING: 0
ABDOMINAL PAIN: 0

## 2022-01-02 ASSESSMENT — PAIN DESCRIPTION - ONSET
ONSET: ON-GOING
ONSET: ON-GOING

## 2022-01-02 ASSESSMENT — PAIN DESCRIPTION - FREQUENCY
FREQUENCY: INTERMITTENT
FREQUENCY: CONTINUOUS

## 2022-01-02 NOTE — PROGRESS NOTES
Neurosurgery Service  Resident Daily Progress Note   1/2/2022 6:14 AM     Subjective    Patient seen and examined at the bedside. Discussed with nursing staff. Chart reviewed. No any acute event overnight. Drain 83 ml over night. Urinalysis positive for UTI with Pseudomonas species    Hemoglobin 9.7    Drain: 83 mL over night     Vitals:    01/01/22 2045 01/01/22 2300 01/02/22 0252 01/02/22 0259   BP: (!) 149/70      Pulse:  93 87 87   Resp:   15 14   Temp: 100 °F (37.8 °C)      TempSrc: Oral      SpO2:   92% 92%       Physical Exam:  Gen: Resting comfortably, no acute distress  CV: RRR  Resp: CTAB  GI: Abdomen soft, non-tender  Skin: Cap refill< 2 seconds, surgical incision  C/D/I    Neuro:      A&Ox3   PERRL, EOMI   CNII-XII intact     Normal speech and mentation      L deltoid 5/5; R deltoid 5/5  L biceps 5/5; R biceps 5/5  L triceps 5/5; R triceps 5/5  L wrist extension 5/5; R wrist extension 5/5  L intrinsics 5/5; R intrinsics 5/5         L iliopsoas 4/5 , R iliopsoas 5/5  L quadriceps 4/5; R quadriceps 5/5  L Dorsiflexion 4/5; R dorsiflexion 4+/5  L Plantarflexion 4/5; R plantarflexion 4+/5       Labs:  Lab Results   Component Value Date    WBC 13.4 (H) 12/31/2021    HGB 10.4 (L) 12/31/2021    HCT 32.0 (L) 12/31/2021     12/31/2021    CHOL 198 07/23/2020    TRIG 96 07/23/2020     (A) 07/23/2020     11/22/2021    K 4.3 11/22/2021     11/22/2021    CREATININE 0.79 12/30/2021    BUN 13 11/22/2021    CO2 25 11/22/2021    GLUF 109 07/23/2020       Radiology (last 24 hours):   No new studies    ASSESSMENT & PLAN:    Mike Shah is a 62 y.o. female who presents with decline in the mobility with complain of perineal and genital  numbness      Spinal deformity, Cauda Equina syndrome    POD# 3 S/p Lumbar Laminectomy L2-S1    -Baclofen 10 mg 3 times daily  -Bruce catheter   -Urecholine 25 mg 4 times daily  -Roxicodone 5-10 as needed  -Resume home dose methadone  -PMR consult  -Lovenox DVT prophylaxis  -Diet: General  -Incentive spirometry  - Neuro checks per floor protocol  - Additional recommendations may follow      Please contact neurosurgery with any changes in patient's neurologic status.       Franky Marcus MD  PGY-3 Neurology Resident Physician  Neurosurgery/Neuro Critical Care Team  Pager [phone]

## 2022-01-02 NOTE — PLAN OF CARE
Problem: Pain:  Goal: Pain level will decrease  Description: Pain level will decrease  1/2/2022 0454 by Andrey Carreon RN  Outcome: Ongoing  1/1/2022 1544 by Cindy Acosta RN  Outcome: Met This Shift  Goal: Control of acute pain  Description: Control of acute pain  1/2/2022 0454 by Andrey Carreon RN  Outcome: Ongoing  1/1/2022 1544 by Cindy Acosta RN  Outcome: Met This Shift  Goal: Control of chronic pain  Description: Control of chronic pain  1/2/2022 0454 by Andrey Carreon RN  Outcome: Ongoing  1/1/2022 1544 by Cindy Acosta RN  Outcome: Met This Shift     Problem: Falls - Risk of:  Goal: Will remain free from falls  Description: Will remain free from falls  1/2/2022 0454 by Andrey Carreon RN  Outcome: Ongoing  1/1/2022 1544 by Cindy Acosta RN  Outcome: Met This Shift  Goal: Absence of physical injury  Description: Absence of physical injury  1/2/2022 0454 by Andrey Carreon RN  Outcome: Ongoing  1/1/2022 1544 by Cindy Acosta RN  Outcome: Met This Shift

## 2022-01-02 NOTE — PROGRESS NOTES
[facility]  Office: [address], DO, Rachel Edgemoor, DO, Juarez Pantoja, DO, Jagdish Late Blood, DO, Abiola Magana MD, Mariann Gooden MD, Pricila Head MD, Annie Vicente MD, Marcel Bojorquez MD, Rosalinda Ingram MD, Aleena Julien MD, Marilin Galaviz, DO, Evens Pedro, DO, Lara Anaya MD,  Lorri Glynn, DO, Ori Rivera MD, Hiram Mccann MD, Dayana Ospina MD, Cm Moreland MD, Annalise Magana MD, Lavonne Tinajero MD, Naomi Edgar MD, Jarrell Gonzales, Gaby Rowley, CNP, Benjamin Cochran, CNP, Andrea Deshpande, CNS, Alfredo Osgood, CNP, Cheri Alert, CNP, Jelly Pollock, CNP, George Webb, CNP, Elva Echeverria, CNP, Ambrose Jordan PA-C, Nehal Lara, DNP, Jason Valdivia, DNP, Bryce Puente, CNP, Kelsey Moise, CNP, Aditya Neal, CNP, Kj Hyde, CNP, Steffany Spence, CNP, Kitty Frazier, CNP         [address]    Progress Note    1/2/2022    12:15 PM    Name:   Javier Purdy  MRN:     [id number]     [place]:      [de-identified]   Room:   [address] [place] [address] Day:  3  Admit Date:  12/30/2021  8:12 AM    PCP:   Kimberly Matt MD  Code Status:  Prior    Subjective:     C/C:   Leg weakness    Interval History Status:   Lying flat  Comfortable  Back Pain rated at 6/10  Sleeping better  Still feels anxious    Data Base Updates:  T-max 100    Calcium8. 3 Low      WBC10.1k/uL RBC3.01 Low m/uL Hemoglobin9.7 Low      Specimen Source: Urine, clean catch Specimen Description. CLEAN CATCH URINE Special RequestsNOT REPORTED CulturePSEUDOMONAS SPECIES >[id number] CFU/ML Abnormal        Brief History:     As documented in the medical record: \"This is a 62-year-old female who presented to the hospital with complaints of lower extremity weakness. Internal medicine was consulted for medical management. Patient has history of hypertension is on Cardizem. Also has a history of hypothyroidism is on Synthroid. Patient has a history of GERD and is on Protonix.   Denies any other significant medical history. Patient denies any fevers, chills, nausea, vomiting, diarrhea. Denies any history of diabetes. Denies any history of atrial fibrillation/CHF/CAD. Patient tolerated surgery well. He is eating, drinking without issue. Pain is controlled. He feels like her strength in her lower extremity is much better than before. \"     The intitial assessment and plan included:  \"Cauda equina compression- s/p L2-S1 laminectomy per NS, pain controlled. Drain still in place  Urinary retention- mario catheter placed this morning  Anemia- from acute blood loss? +iron deficiency, check Vit B12/ folate, stool for occult blood, monitor H/H, replace iron  HTN- BP stable  Hypothryoid  Chronic pain - on Methadone from pain management  Constipation- milk of mag given, start Senna-S daily, pt on chronic narcotics, hasn't had a BM for days  DVT proph  PT/OT\"     Urine culture revealed Pseudomonas    Anemia work-up initiated:  Results for Archie Singh (MRN [id number]) as of 1/2/2022 12:02   Ref. Range 10/22/2021 11:07 1/1/2022 05:31 1/1/2022 17:00   Ferritin Latest Ref Range: 13 - 150 ug/L  54    Iron Latest Ref Range: 37 - 145 ug/dL  29 (L)    Iron Saturation Latest Ref Range: 20 - 55 %  11 (L)    UIBC Latest Ref Range: 112 - 347 ug/dL  230    TIBC Latest Ref Range: 250 - 450 ug/dL  259    Folate Latest Ref Range: >4.8 ng/mL   8.8   Vitamin B-12 Latest Ref Range: 232 - 1245 pg/mL 533  338       Past Medical History:   has a past medical history of Anxiety, Arthritis, At maximum risk for fall, Cancer (HCC), Cauda equina syndrome (HCC), Cervical stenosis of spine, GERD (gastroesophageal reflux disease), History of stomach ulcers, Hypertension, Hypothyroidism, Lumbar neuritis, Post laminectomy syndrome, Spinal deformity, and Thyroid disease. Social History:   reports that she quit smoking about 8 years ago. She has a 15.00 pack-year smoking history.  She has never used smokeless tobacco. She reports current alcohol use. She reports that she does not use drugs. Family History:   Family History   Problem Relation Age of Onset    Hypertension Mother     Heart Disease Mother     Cancer Mother        Review of Systems:     Review of Systems   Constitutional: Positive for activity change, fatigue (Exercise capacity reduced) and fever. Negative for chills. Respiratory: Negative for cough and shortness of breath. Cardiovascular: Negative for chest pain and palpitations. Gastrointestinal: Negative for abdominal pain, constipation (Patient reports no BM since surgery), nausea and vomiting. Genitourinary: Positive for difficulty urinating (Catheter remains in place). Negative for flank pain and hematuria. Musculoskeletal: Positive for back pain (Incisional pain rated 6/10) and gait problem. Neurological: Positive for weakness (legs still weak ). Physical Examination:        Vitals  BP (!) 161/89   Pulse 105   Temp 99 °F (37.2 °C) (Oral)   Resp 23   SpO2 94%   Temp (24hrs), Av.5 °F (37.5 °C), Min:99 °F (37.2 °C), Max:100 °F (37.8 °C)    No results for input(s): POCGLU in the last 72 hours. Physical Exam  Vitals reviewed. Constitutional:       General: She is not in acute distress. Appearance: She is not diaphoretic. HENT:      Head: Normocephalic. Nose: Nose normal.   Eyes:      General: No scleral icterus. Conjunctiva/sclera: Conjunctivae normal.   Neck:      Trachea: No tracheal deviation. Cardiovascular:      Rate and Rhythm: Normal rate and regular rhythm. Pulmonary:      Effort: Pulmonary effort is normal. No respiratory distress. Breath sounds: Normal breath sounds. No wheezing or rales. Chest:      Chest wall: No tenderness. Abdominal:      General: There is no distension. Palpations: Abdomen is soft. Tenderness: There is no abdominal tenderness.    Genitourinary:     Comments: Bruce collecting yellow urine  Musculoskeletal:         General: No tenderness. Cervical back: Neck supple. Skin:     General: Skin is warm and dry. Neurological:      Mental Status: She is alert and oriented to person, place, and time. Medications: Allergies: Allergies   Allergen Reactions    Latex Hives, Itching, Dermatitis and Rash    Kiwi Extract      Other reaction(s): Facial Swelling       Current Meds:   Scheduled Meds:    bethanechol  25 mg Oral Q6H    polyethylene glycol  17 g Oral BID    magnesium hydroxide  15 mL Oral Q6H    piperacillin-tazobactam  3,375 mg IntraVENous Q8H    sennosides-docusate sodium  2 tablet Oral Daily    ferrous sulfate  325 mg Oral BID WC    methadone  10 mg Oral BID    enoxaparin  40 mg SubCUTAneous Daily    busPIRone  30 mg Oral BID    dilTIAZem  180 mg Oral Daily    levothyroxine  88 mcg Oral Daily    pantoprazole  40 mg Oral Daily    baclofen  10 mg Oral TID    bupivacaine liposome  20 mL Infiltration Once     Continuous Infusions:   PRN Meds: bisacodyl, oxyCODONE, oxyCODONE    Data:     I/O (24Hr): Intake/Output Summary (Last 24 hours) at 1/2/2022 1215  Last data filed at 1/2/2022 1104  Gross per 24 hour   Intake 1160 ml   Output 1358 ml   Net -198 ml       Labs:  Hematology:  Recent Labs     12/31/21  0535 01/02/22  0606   WBC 13.4* 10.1   RBC 3.20* 3.01*   HGB 10.4* 9.7*   HCT 32.0* 30.5*   .0 101.3   MCH 32.5 32.2   MCHC 32.5 31.8   RDW 12.5 12.9    234   MPV 9.5 9.6     Chemistry:  Recent Labs     01/02/22  0606      K 3.8      CO2 25   GLUCOSE 102*   BUN 13   CREATININE 0.62   ANIONGAP 13   LABGLOM >60   GFRAA >60   CALCIUM 8.3*   No results for input(s): PROT, LABALBU, LABA1C, F6OAOBT, U2SCNJA, FT4, TSH, AST, ALT, LDH, GGT, ALKPHOS, LABGGT, BILITOT, BILIDIR, AMMONIA, AMYLASE, LIPASE, LACTATE, CHOL, HDL, LDLCHOLESTEROL, CHOLHDLRATIO, TRIG, VLDL, RNI82XO, PHENYTOIN, PHENYF, URICACID, POCGLU in the last 72 hours.   ABG:No results found for: [facility], POCPCO2, RUD4BIE, PCO2, POCPO2, PO2ART, PO2, POCHCO3, CYE3PSB, HCO3, NBEA, PBEA, BEART, BE, THGBART, THB, DNA0LKP, IGCD6ZQS, Q9DMRVDU, O2SAT, FIO2  Lab Results   Component Value Date/Time    SPECIAL NOT REPORTED 01/01/2022 10:20 AM     Lab Results   Component Value Date/Time    CULTURE PSEUDOMONAS SPECIES >[id number] CFU/ML (A) 01/01/2022 10:20 AM       Radiology:  XR ABDOMEN (KUB) (SINGLE AP VIEW)    Result Date: 1/2/2022  1. Gaseous distension of small and large bowel which could be related to an ileus versus bowel obstruction.        Assessment:        Primary Problem  Cauda equina compression [facility])    Active Hospital Problems    Diagnosis Date Noted    Pseudomonas urinary tract infection [N39.0, B96.5] 01/02/2022    Hypocalcemia [E83.51] 01/02/2022    Iron deficiency [E61.1] 01/01/2022    Anemia, normocytic normochromic [D64.9] 12/31/2021    Cauda equina compression ([address] [G83.4] 12/30/2021    Hypothyroidism [E03.9] 09/08/2016    Essential hypertension [I10] 12/01/2015       Plan:        NS eval & f/u as scheduled   Pain management, Methadone  Antibiotics per C&S Results, Zosyn initiated   Observe for urine retention  Laxatives as needed  Blood Pressure - Monitor and control  Thyroid replacement titrated to normalize thyroid hormones  Anemia w/u on outpatient basis is suggested    DVT prophylaxis    IP CONSULT TO INTERNAL MEDICINE  IP CONSULT TO PHYSICAL MEDICINE REHAB  IP CONSULT TO GENERAL SURGERY  IP CONSULT TO [address]   1/2/2022  12:15 PM

## 2022-01-02 NOTE — PROGRESS NOTES
Patient drowsy throughout the day. Patient not eating due to sleepiness. She even asked, \"why am I so tired today\". Patient forgot pill was in her hand and went back to sleep immediately after discussion. Dr. Olivas Notice paged. Unable to page Noe Thompson thru perfect serve.

## 2022-01-02 NOTE — CONSULTS
General Surgery  History and Physical    PATIENT NAME: Mary Fermin  AGE: 62 y.o. MEDICAL RECORD NO. [id number]  DATE: 1/2/2022  SURGEON: Vidya Donovan  PRIMARY CARE PHYSICIAN: Rosa Marquez MD    Patient evaluated at the request of  Dr. Erik Malik  Reason for evaluation: Need for trach and PEG    Patient information was obtained from patient. History/Exam limitations: none. IMPRESSION:     Patient Active Problem List   Diagnosis    Cervical stenosis of spine    Essential hypertension    Hypothyroidism    Lumbar radiculopathy, chronic    Lumbar neuritis    Post laminectomy syndrome    Hypokalemia    Pain of right hip joint    Post-menopausal osteoporosis    Chronic gastritis without bleeding    Elevated CEA    Esophageal dysphagia    Hypotension due to drugs    Ulcerative esophagitis    Weight loss, abnormal    Cervical myelopathy (HCC)    Lumbar stenosis with neurogenic claudication    Spinal deformity    Anxiety about health    Cauda equina compression (HCC)    Anemia, normocytic normochromic    Iron deficiency    Pseudomonas urinary tract infection    Hypocalcemia   1. Constipation  2. KUB showing gaseous distention of small and large bowel      PLAN:   1. Patient tolerating regular diet, denies nausea vomiting, is currently passing gas, remains afebrile without elevated white blood cell count  2. Recommend addition of suppositories to patient's bowel regimen  3. Recommend repeat KUB in a.m. if no progress with bowel movement  4. Recommend check mag and Phos labs, replete lytes as needed  5. We will re evaluate patient in the a.m. HISTORY:   History of Chief Complaint:    Mary Fermin is a 62 y.o. female who presented for lumbar laminectomy with drain placement on 12/30/2021. She was experiencing progressive lower extremity weakness and anesthesia over the last 4 to 6 weeks. She is on methadone from pain management.   Abdominal surgical history is significant for total abdominal hysterectomy. We are consulted for ileus versus bowel obstruction. Patient has not had a bowel movement for 5 days. She does endorse chronic constipation with opioid use at home. She has occasionally gone for 5 days without a bowel movement however is not typical.  She endorses mild abdominal pain. She denies nausea vomiting, and is tolerating a regular diet. She endorses passing gas, stated she passed gas during my physical exam.  KUB showed concern for ileus versus small bowel obstruction with gaseous distention of small and large bowel. Past Medical History   has a past medical history of Anxiety, Arthritis, At maximum risk for fall, Cancer ([facility] [address] Cauda equina syndrome (HCC), Cervical stenosis of spine, GERD (gastroesophageal reflux disease), History of stomach ulcers, Hypertension, Hypothyroidism, Lumbar neuritis, Post laminectomy syndrome, Spinal deformity, and Thyroid disease. Past Surgical History   has a past surgical history that includes back surgery; hernia repair (Bilateral); Breast surgery (Right); cervical laminectomy (11/04/2014); Mastectomy, radical; and Hysterectomy, total abdominal.  Medications  Prior to Admission medications    Medication Sig Start Date End Date Taking?  Authorizing Provider   pantoprazole (PROTONIX) 40 MG tablet TAKE ONE TABLET BY MOUTH DAILY 12/20/21  Yes Ana Luisa Arrington MD   busPIRone (BUSPAR) 30 MG tablet Take 30 mg by mouth 2 times daily 12/17/21  Yes Ana Luisa Arrington MD   levothyroxine (SYNTHROID) 88 MCG tablet Take 1 tablet by mouth Daily 12/17/21 1/16/22 Yes Ana Luisa Arrington MD   dilTIAZem (DILACOR XR) 180 MG extended release capsule Take 1 capsule by mouth daily 12/17/21  Yes Ana Luisa Arrington MD   tiZANidine (ZANAFLEX) 4 MG tablet Take 4 mg by mouth every 12 hours   Yes Historical Provider, MD   Potassium Gluconate 595 (99 K) MG TABS Take 1 tablet by mouth daily 9/16/21  Yes Ana Luisa Arrington MD   Vitamin D, Ergocalciferol, 50 MCG (2000 UT) CAPS TAKE ONE CAPSULE BY MOUTH DAILY 3/29/21  Yes Stu Arizmendi PA-C   HYDROcodone-acetaminophen [facility]) 7.5-325 MG per tablet Up to three tablets a day. 10/21/15  Yes Historical Provider, MD   methadone (DOLOPHINE) 10 MG tablet Take 10 mg by mouth 2 times daily. Yes Historical Provider, MD    Scheduled Meds:   bethanechol  25 mg Oral Q6H    polyethylene glycol  17 g Oral BID    magnesium hydroxide  15 mL Oral Q6H    piperacillin-tazobactam  3,375 mg IntraVENous Q8H    sennosides-docusate sodium  2 tablet Oral Daily    ferrous sulfate  325 mg Oral BID WC    methadone  10 mg Oral BID    enoxaparin  40 mg SubCUTAneous Daily    busPIRone  30 mg Oral BID    dilTIAZem  180 mg Oral Daily    levothyroxine  88 mcg Oral Daily    pantoprazole  40 mg Oral Daily    baclofen  10 mg Oral TID    bupivacaine liposome  20 mL Infiltration Once     Continuous Infusions:  PRN Meds:.bisacodyl, oxyCODONE, oxyCODONE  Allergies  is allergic to latex and kiwi extract. Family History  family history includes Cancer in her mother; Heart Disease in her mother; Hypertension in her mother. Social History   reports that she quit smoking about 8 years ago. She has a 15.00 pack-year smoking history. She has never used smokeless tobacco.   reports current alcohol use. reports no history of drug use. Review of Systems  General Denies any fever or chills  HEENT  Denies any diplopia, tinnitus or vertigo  Resp Denies any shortness of breath, cough or wheezing  Cardiac Denies any chest pain, palpitations, claudication or edema  GI Denies any melena, hematochezia, hematemesis or pyrosis   Denies any frequency, urgency, hesitancy or incontinence  Heme Denies bruising or bleeding easily  Endocrine Denies any history of diabetes or thyroid disease  Neuro Denies any focal motor or sensory deficits    PHYSICAL:   VITALS:  oral temperature is 100.6 °F (38.1 °C). Her blood pressure is 135/74 and her pulse is 93.  Her respiration is 16 and oxygen saturation is 93%. CONSTITUTIONAL: Alert and oriented times 3, no acute distress and cooperative to examination. HEENT: Head is normocephalic, atraumatic. EOMI, PERRLA  NECK: Soft, trachea midline and straight  LUNGS: Chest expands equally bilaterally upon respiration, no accessory muscle used. CARDIOVASCULAR:  Regular rate and rhythm   ABDOMEN: soft, minimally tender to palpation epigastric region, distended, no masses or organomegaly, no hernias palpable, no guarding or peritoneal signs. NEUROLOGIC: CN II-XII are grossly intact. There are no focalizing motor or sensory deficits  EXTREMITIES: no cyanosis, clubbing or edema    LABS:     Recent Labs     12/31/21  0535 01/02/22  0606   WBC 13.4* 10.1   HGB 10.4* 9.7*   HCT 32.0* 30.5*    234   NA  --  138   K  --  3.8   CL  --  100   CO2  --  25   BUN  --  13   CREATININE  --  0.62   CALCIUM  --  8.3*     No results for input(s): ALKPHOS, ALT, AST, BILITOT, BILIDIR, LABALBU, AMYLASE, LIPASE in the last 72 hours. CBC with Differential:    Lab Results   Component Value Date    WBC 10.1 01/02/2022    RBC 3.01 01/02/2022    HGB 9.7 01/02/2022    HCT 30.5 01/02/2022     01/02/2022    .3 01/02/2022    MCH 32.2 01/02/2022    MCHC 31.8 01/02/2022    RDW 12.9 01/02/2022    LYMPHOPCT 4 12/31/2021    MONOPCT 2 12/31/2021    BASOPCT 0 12/31/2021    MONOSABS 0.27 12/31/2021    LYMPHSABS 0.54 12/31/2021    EOSABS 0.00 12/31/2021    BASOSABS 0.00 12/31/2021    DIFFTYPE NOT REPORTED 12/31/2021     BMP:    Lab Results   Component Value Date     01/02/2022    K 3.8 01/02/2022     01/02/2022    CO2 25 01/02/2022    BUN 13 01/02/2022    CREATININE 0.62 01/02/2022    CALCIUM 8.3 01/02/2022    GFRAA >60 01/02/2022    LABGLOM >60 01/02/2022    GLUCOSE 102 01/02/2022         RADIOLOGY:   XR ABDOMEN (KUB) (SINGLE AP VIEW)    Result Date: 1/2/2022  1.  Gaseous distension of small and large bowel which could be related to an ileus versus bowel obstruction. Thank you for the interesting evaluation. Further recommendations to follow.     Bia Gilliam DO  1/2/2022, 5:05 PM

## 2022-01-03 LAB
ABSOLUTE EOS #: 0.08 K/UL (ref 0–0.44)
ABSOLUTE IMMATURE GRANULOCYTE: 0.07 K/UL (ref 0–0.3)
ABSOLUTE LYMPH #: 1.58 K/UL (ref 1.1–3.7)
ABSOLUTE MONO #: 0.79 K/UL (ref 0.1–1.2)
ANION GAP SERPL CALCULATED.3IONS-SCNC: 14 MMOL/L (ref 9–17)
BASOPHILS # BLD: 0 % (ref 0–2)
BASOPHILS ABSOLUTE: 0.03 K/UL (ref 0–0.2)
BUN BLDV-MCNC: 13 MG/DL (ref 8–23)
BUN/CREAT BLD: ABNORMAL (ref 9–20)
CALCIUM SERPL-MCNC: 8.7 MG/DL (ref 8.6–10.4)
CHLORIDE BLD-SCNC: 98 MMOL/L (ref 98–107)
CO2: 24 MMOL/L (ref 20–31)
CREAT SERPL-MCNC: 0.69 MG/DL (ref 0.5–0.9)
DIFFERENTIAL TYPE: ABNORMAL
EOSINOPHILS RELATIVE PERCENT: 1 % (ref 1–4)
GFR AFRICAN AMERICAN: >60 ML/MIN
GFR NON-AFRICAN AMERICAN: >60 ML/MIN
GFR SERPL CREATININE-BSD FRML MDRD: ABNORMAL ML/MIN/{1.73_M2}
GFR SERPL CREATININE-BSD FRML MDRD: ABNORMAL ML/MIN/{1.73_M2}
GLUCOSE BLD-MCNC: 100 MG/DL (ref 70–99)
HCT VFR BLD CALC: 28.9 % (ref 36.3–47.1)
HEMOGLOBIN: 9.2 G/DL (ref 11.9–15.1)
IMMATURE GRANULOCYTES: 1 %
LYMPHOCYTES # BLD: 18 % (ref 24–43)
MCH RBC QN AUTO: 32.2 PG (ref 25.2–33.5)
MCHC RBC AUTO-ENTMCNC: 31.8 G/DL (ref 28.4–34.8)
MCV RBC AUTO: 101 FL (ref 82.6–102.9)
MONOCYTES # BLD: 9 % (ref 3–12)
NRBC AUTOMATED: 0 PER 100 WBC
PDW BLD-RTO: 12.9 % (ref 11.8–14.4)
PLATELET # BLD: 277 K/UL (ref 138–453)
PLATELET ESTIMATE: ABNORMAL
PMV BLD AUTO: 9.5 FL (ref 8.1–13.5)
POTASSIUM SERPL-SCNC: 3.8 MMOL/L (ref 3.7–5.3)
RBC # BLD: 2.86 M/UL (ref 3.95–5.11)
RBC # BLD: ABNORMAL 10*6/UL
SEG NEUTROPHILS: 72 % (ref 36–65)
SEGMENTED NEUTROPHILS ABSOLUTE COUNT: 6.48 K/UL (ref 1.5–8.1)
SODIUM BLD-SCNC: 136 MMOL/L (ref 135–144)
WBC # BLD: 9 K/UL (ref 3.5–11.3)
WBC # BLD: ABNORMAL 10*3/UL

## 2022-01-03 PROCEDURE — 6370000000 HC RX 637 (ALT 250 FOR IP): Performed by: STUDENT IN AN ORGANIZED HEALTH CARE EDUCATION/TRAINING PROGRAM

## 2022-01-03 PROCEDURE — 6370000000 HC RX 637 (ALT 250 FOR IP): Performed by: INTERNAL MEDICINE

## 2022-01-03 PROCEDURE — APPSS30 APP SPLIT SHARED TIME 16-30 MINUTES: Performed by: NURSE PRACTITIONER

## 2022-01-03 PROCEDURE — 97110 THERAPEUTIC EXERCISES: CPT

## 2022-01-03 PROCEDURE — 2580000003 HC RX 258: Performed by: INTERNAL MEDICINE

## 2022-01-03 PROCEDURE — 36415 COLL VENOUS BLD VENIPUNCTURE: CPT

## 2022-01-03 PROCEDURE — 85025 COMPLETE CBC W/AUTO DIFF WBC: CPT

## 2022-01-03 PROCEDURE — 99232 SBSQ HOSP IP/OBS MODERATE 35: CPT | Performed by: INTERNAL MEDICINE

## 2022-01-03 PROCEDURE — 97535 SELF CARE MNGMENT TRAINING: CPT

## 2022-01-03 PROCEDURE — 97116 GAIT TRAINING THERAPY: CPT

## 2022-01-03 PROCEDURE — 80048 BASIC METABOLIC PNL TOTAL CA: CPT

## 2022-01-03 PROCEDURE — 6360000002 HC RX W HCPCS: Performed by: STUDENT IN AN ORGANIZED HEALTH CARE EDUCATION/TRAINING PROGRAM

## 2022-01-03 PROCEDURE — 2580000003 HC RX 258: Performed by: STUDENT IN AN ORGANIZED HEALTH CARE EDUCATION/TRAINING PROGRAM

## 2022-01-03 PROCEDURE — 6360000002 HC RX W HCPCS: Performed by: INTERNAL MEDICINE

## 2022-01-03 PROCEDURE — 1200000000 HC SEMI PRIVATE

## 2022-01-03 RX ORDER — ONDANSETRON 4 MG/1
4 TABLET, ORALLY DISINTEGRATING ORAL EVERY 8 HOURS PRN
Status: DISCONTINUED | OUTPATIENT
Start: 2022-01-03 | End: 2022-01-04 | Stop reason: HOSPADM

## 2022-01-03 RX ORDER — CIPROFLOXACIN 500 MG/1
500 TABLET, FILM COATED ORAL EVERY 12 HOURS SCHEDULED
Status: DISCONTINUED | OUTPATIENT
Start: 2022-01-03 | End: 2022-01-04 | Stop reason: HOSPADM

## 2022-01-03 RX ORDER — ONDANSETRON 2 MG/ML
4 INJECTION INTRAMUSCULAR; INTRAVENOUS EVERY 6 HOURS PRN
Status: DISCONTINUED | OUTPATIENT
Start: 2022-01-03 | End: 2022-01-04 | Stop reason: HOSPADM

## 2022-01-03 RX ORDER — BISACODYL 10 MG
10 SUPPOSITORY, RECTAL RECTAL DAILY PRN
Status: DISCONTINUED | OUTPATIENT
Start: 2022-01-03 | End: 2022-01-04 | Stop reason: HOSPADM

## 2022-01-03 RX ORDER — MAGNESIUM GLUCONATE 30 MG(550)
TABLET ORAL
Qty: 90 TABLET | Refills: 0 | Status: SHIPPED | OUTPATIENT
Start: 2022-01-03 | End: 2022-04-11

## 2022-01-03 RX ORDER — SODIUM CHLORIDE 9 MG/ML
25 INJECTION, SOLUTION INTRAVENOUS PRN
Status: DISCONTINUED | OUTPATIENT
Start: 2022-01-03 | End: 2022-01-04 | Stop reason: HOSPADM

## 2022-01-03 RX ORDER — SODIUM CHLORIDE 0.9 % (FLUSH) 0.9 %
5-40 SYRINGE (ML) INJECTION EVERY 12 HOURS SCHEDULED
Status: DISCONTINUED | OUTPATIENT
Start: 2022-01-03 | End: 2022-01-04 | Stop reason: HOSPADM

## 2022-01-03 RX ORDER — SODIUM CHLORIDE 0.9 % (FLUSH) 0.9 %
5-40 SYRINGE (ML) INJECTION PRN
Status: DISCONTINUED | OUTPATIENT
Start: 2022-01-03 | End: 2022-01-04 | Stop reason: HOSPADM

## 2022-01-03 RX ADMIN — FERROUS SULFATE TAB EC 325 MG (65 MG FE EQUIVALENT) 325 MG: 325 (65 FE) TABLET DELAYED RESPONSE at 16:36

## 2022-01-03 RX ADMIN — OXYCODONE HYDROCHLORIDE 10 MG: 5 TABLET ORAL at 18:15

## 2022-01-03 RX ADMIN — DILTIAZEM HYDROCHLORIDE 180 MG: 90 CAPSULE, EXTENDED RELEASE ORAL at 09:32

## 2022-01-03 RX ADMIN — BETHANECHOL CHLORIDE 25 MG: 25 TABLET ORAL at 09:29

## 2022-01-03 RX ADMIN — Medication 15 ML: at 20:55

## 2022-01-03 RX ADMIN — OXYCODONE HYDROCHLORIDE 5 MG: 5 TABLET ORAL at 06:12

## 2022-01-03 RX ADMIN — BETHANECHOL CHLORIDE 25 MG: 25 TABLET ORAL at 04:19

## 2022-01-03 RX ADMIN — OXYCODONE HYDROCHLORIDE 10 MG: 5 TABLET ORAL at 14:40

## 2022-01-03 RX ADMIN — ENOXAPARIN SODIUM 40 MG: 100 INJECTION SUBCUTANEOUS at 09:30

## 2022-01-03 RX ADMIN — PIPERACILLIN AND TAZOBACTAM 3375 MG: 3; .375 INJECTION, POWDER, FOR SOLUTION INTRAVENOUS at 06:00

## 2022-01-03 RX ADMIN — BETHANECHOL CHLORIDE 25 MG: 25 TABLET ORAL at 20:54

## 2022-01-03 RX ADMIN — DOCUSATE SODIUM 50MG AND SENNOSIDES 8.6MG 2 TABLET: 8.6; 5 TABLET, FILM COATED ORAL at 09:29

## 2022-01-03 RX ADMIN — LEVOTHYROXINE SODIUM 88 MCG: 88 TABLET ORAL at 06:01

## 2022-01-03 RX ADMIN — SODIUM CHLORIDE, PRESERVATIVE FREE 10 ML: 5 INJECTION INTRAVENOUS at 09:30

## 2022-01-03 RX ADMIN — CIPROFLOXACIN 500 MG: 500 TABLET, FILM COATED ORAL at 20:54

## 2022-01-03 RX ADMIN — METHADONE HYDROCHLORIDE 10 MG: 10 TABLET ORAL at 20:55

## 2022-01-03 RX ADMIN — BETHANECHOL CHLORIDE 25 MG: 25 TABLET ORAL at 14:40

## 2022-01-03 RX ADMIN — FERROUS SULFATE TAB EC 325 MG (65 MG FE EQUIVALENT) 325 MG: 325 (65 FE) TABLET DELAYED RESPONSE at 09:29

## 2022-01-03 RX ADMIN — OXYCODONE HYDROCHLORIDE 10 MG: 5 TABLET ORAL at 11:12

## 2022-01-03 RX ADMIN — BUSPIRONE HYDROCHLORIDE 30 MG: 15 TABLET ORAL at 09:29

## 2022-01-03 RX ADMIN — SODIUM CHLORIDE, PRESERVATIVE FREE 10 ML: 5 INJECTION INTRAVENOUS at 20:55

## 2022-01-03 RX ADMIN — BUSPIRONE HYDROCHLORIDE 30 MG: 15 TABLET ORAL at 20:54

## 2022-01-03 RX ADMIN — METHADONE HYDROCHLORIDE 10 MG: 10 TABLET ORAL at 09:30

## 2022-01-03 RX ADMIN — Medication 15 ML: at 09:30

## 2022-01-03 RX ADMIN — PANTOPRAZOLE SODIUM 40 MG: 40 TABLET, DELAYED RELEASE ORAL at 09:29

## 2022-01-03 ASSESSMENT — PAIN DESCRIPTION - PROGRESSION
CLINICAL_PROGRESSION: OTHER (COMMENT)
CLINICAL_PROGRESSION: GRADUALLY IMPROVING
CLINICAL_PROGRESSION: NOT CHANGED
CLINICAL_PROGRESSION: OTHER (COMMENT)

## 2022-01-03 ASSESSMENT — PAIN SCALES - GENERAL
PAINLEVEL_OUTOF10: 5
PAINLEVEL_OUTOF10: 6
PAINLEVEL_OUTOF10: 7
PAINLEVEL_OUTOF10: 0
PAINLEVEL_OUTOF10: 7
PAINLEVEL_OUTOF10: 5
PAINLEVEL_OUTOF10: 5
PAINLEVEL_OUTOF10: 7
PAINLEVEL_OUTOF10: 7

## 2022-01-03 ASSESSMENT — PAIN DESCRIPTION - LOCATION: LOCATION: BACK

## 2022-01-03 ASSESSMENT — PAIN SCALES - WONG BAKER
WONGBAKER_NUMERICALRESPONSE: 0

## 2022-01-03 ASSESSMENT — ENCOUNTER SYMPTOMS
SHORTNESS OF BREATH: 0
BACK PAIN: 1
ABDOMINAL PAIN: 0
NAUSEA: 0
CONSTIPATION: 0
VOMITING: 0
COUGH: 0

## 2022-01-03 ASSESSMENT — PAIN DESCRIPTION - PAIN TYPE: TYPE: SURGICAL PAIN

## 2022-01-03 NOTE — PROGRESS NOTES
PROGRESS NOTE    PATIENT NAME: Sha Abebe RECORD NO. [id number]  DATE: 1/3/2022  SURGEON: Alison Martínez  PRIMARY CARE PHYSICIAN: Fidelia Gray MD    HD: # 4    ASSESSMENT  Patient Active Problem List   Diagnosis    Cervical stenosis of spine    Essential hypertension    Hypothyroidism    Lumbar radiculopathy, chronic    Lumbar neuritis    Post laminectomy syndrome    Hypokalemia    Pain of right hip joint    Post-menopausal osteoporosis    Chronic gastritis without bleeding    Elevated CEA    Esophageal dysphagia    Hypotension due to drugs    Ulcerative esophagitis    Weight loss, abnormal    Cervical myelopathy (HCC)    Lumbar stenosis with neurogenic claudication    Spinal deformity    Anxiety about health    Cauda equina compression (HCC)    Anemia, normocytic normochromic    Iron deficiency    Pseudomonas urinary tract infection    Hypocalcemia     New diagnoses: resolution ileus    PLAN  1. Advance diet     SUBJECTIVE  Patient is doing well. Pain is controlled. she is tolerating a ADULT DIET; Regular diet. Patient is tolerating up with assistance. Patient is passing flatus and has not yet had a bowel movement. Patient denies nausea or vomiting.      OBJECTIVE  VITALS   Patient Vitals for the past 24 hrs:   BP Temp Temp src Pulse Resp SpO2   01/03/22 0734 135/74 98.1 °F (36.7 °C) Oral 100 12 95 %   01/03/22 0430 -- -- -- 82 -- --   01/03/22 0100 -- -- -- 89 -- --   01/03/22 0045 -- -- -- 90 13 94 %   01/02/22 1923 138/72 98.4 °F (36.9 °C) Oral 90 17 93 %   01/02/22 1509 135/74 100.6 °F (38.1 °C) Oral 93 16 93 %   01/02/22 1106 -- -- -- 105 23 94 %     GENERAL: alert  NEUROLOGIC: alert, oriented, normal speech, no focal findings or movement disorder noted  LUNGS: clear to auscultation bilaterally- no wheezes, rales or rhonchi, normal air movement, no respiratory distress  HEART: normal rate  ABDOMEN: soft, non-tender, non-distended, normal bowel sounds, no masses or organomegaly  WOUNDS:   EXTREMITY: no cyanosis and no clubbing    24 HR INTAKE/OUTPUT:     Intake/Output Summary (Last 24 hours) at 1/3/2022 1030  Last data filed at 1/3/2022 0620  Gross per 24 hour   Intake 820 ml   Output 1510 ml   Net -690 ml       Chest X-Ray: See radiology report    LABS:  CBC:   Recent Labs     01/02/22  0606 01/03/22  0508   WBC 10.1 9.0   HGB 9.7* 9.2*   HCT 30.5* 28.9*   .3 101.0    277     BMP: [email or web address]  COAGS: No results for input(s): APTT, PROT, INR in the last 72 hours. PANCREAS:  No results for input(s): LIPASE, AMYLASE in the last 72 hours. LIVER: No results for input(s): AST, ALT, BILIDIR, BILITOT, ALKPHOS in the last 72 hours.   CBC:   Lab Results   Component Value Date    WBC 9.0 01/03/2022    RBC 2.86 01/03/2022    HGB 9.2 01/03/2022    HCT 28.9 01/03/2022    .0 01/03/2022    MCH 32.2 01/03/2022    MCHC 31.8 01/03/2022    RDW 12.9 01/03/2022     01/03/2022    MPV 9.5 01/03/2022     BMP:    Lab Results   Component Value Date     01/03/2022    K 3.8 01/03/2022    CL 98 01/03/2022    CO2 24 01/03/2022    BUN 13 01/03/2022    CREATININE 0.69 01/03/2022    CALCIUM 8.7 01/03/2022    GFRAA >60 01/03/2022    LABGLOM >60 01/03/2022    GLUCOSE 100 01/03/2022       Valentina Vasquez MD  1/3/22, 10:30 AM

## 2022-01-03 NOTE — PROGRESS NOTES
[facility]  Office: [address], DO, Rosalba Escobar, DO, Mirlande Lev, DO, Juan Luis Scott, DO, Milo Chisholm MD, Gayla Biggs MD, Miguel Angel Delgadillo MD, Jovanni Trivedi MD, Isha Martin MD, Albertina Klein MD, Michelle Flaherty MD, Viola Stockton, DO, Fritz Clark DO, Guanako Pearson MD,  Wendy Barraza DO, Pretty Arroyo MD, Reji Templeton MD, Boby Menendez MD, Irene Bragg MD, Argelia Park MD, Delicia Rodriguez MD, Camila Johnson MD, [facility], [facility], [facility], CNP, Geri Burnett, CNP, Geno Sainz, CNS, Ginna Espinosa, CNP, Yoanna Prieto, CNP, Redd Aponte, CNP, Alton Grossman, CNP, Megan Lauraeno, CNP, Kelley Lopez PA-C, Gareth Martin, [facility], Hayley Banuelos [facility], Reza Potts, CNP, Marina Manrique, CNP, Kavin Kelly, CNP, Amanda Meadows, CNP, Sabina Barton, CNP, Zechariah Marshall, [address] [place] [address]    Progress Note    1/3/2022    1:00 PM    Name:   Imelda Preston  MRN:     [id number]     Acct:      [de-identified]   Room:   [address] [place] [address] Day:  4  Admit Date:  12/30/2021  8:12 AM    PCP:   Cristian Peña MD  Code Status:  Full Code    Subjective:     C/C:   Leg weakness    Interval History Status:   POD #4 lumbar laminectomy L2-S1. Improving  Back pain rated 5/10  Doing well with diet  Had BM    Data Base Updates:  Afebrile    X-ray:  Impression:    1. Gaseous distension of small and large bowel which could be related to an   ileus versus bowel obstruction. WBC9.0k/uL RBC2.86 Low m/uL Hemoglobin9. 2 Low      [id number] High mg/dL     VGW84nn/dL   CREATININE0.69       Brief History:     As documented in the medical record: \"This is a 28-year-old female who presented to the hospital with complaints of lower extremity weakness. Internal medicine was consulted for medical management. Patient has history of hypertension is on Cardizem. Also has a history of hypothyroidism is on Synthroid. Patient has a history of GERD and is on Protonix. Denies any other significant medical history. Patient denies any fevers, chills, nausea, vomiting, diarrhea. Denies any history of diabetes. Denies any history of atrial fibrillation/CHF/CAD. Patient tolerated surgery well. He is eating, drinking without issue. Pain is controlled. He feels like her strength in her lower extremity is much better than before. \"     The intitial assessment and plan included:  \"Cauda equina compression- s/p L2-S1 laminectomy per NS, pain controlled. Drain still in place  Urinary retention- mario catheter placed this morning  Anemia- from acute blood loss? +iron deficiency, check Vit B12/ folate, stool for occult blood, monitor H/H, replace iron  HTN- BP stable  Hypothryoid  Chronic pain - on Methadone from pain management  Constipation- milk of mag given, start Senna-S daily, pt on chronic narcotics, hasn't had a BM for days  DVT proph  PT/OT\"     Urine culture revealed Pseudomonas  Susceptibility     Pseudomonas aeruginosa     BACTERIAL SUSCEPTIBILITY PANEL GET     amikacin NOT REPORTED       ceFAZolin NOT REPORTED       cefepime <=1  Sensitive     ciprofloxacin <=0.25  Sensitive     gentamicin <=1  Sensitive     meropenem NOT REPORTED       piperacillin-tazobactam <=4  Sensitive     tigecycline NOT REPORTED       tobramycin <=1  Sensitive              Anemia work-up initiated:  Results for Archie Singh (MRN [id number]) as of 1/2/2022 12:02   Ref.  Range 10/22/2021 11:07 1/1/2022 05:31 1/1/2022 17:00   Ferritin Latest Ref Range: 13 - 150 ug/L  54    Iron Latest Ref Range: 37 - 145 ug/dL  29 (L)    Iron Saturation Latest Ref Range: 20 - 55 %  11 (L)    UIBC Latest Ref Range: 112 - 347 ug/dL  230    TIBC Latest Ref Range: 250 - 450 ug/dL  259    Folate Latest Ref Range: >4.8 ng/mL   8.8   Vitamin B-12 Latest Ref Range: 232 - 1245 pg/mL 533  338       Past Medical History:   has a past medical history of Anxiety, Arthritis, At maximum risk for fall, Cancer (HCC), Cauda equina syndrome ([address] Cervical stenosis of spine, GERD (gastroesophageal reflux disease), History of stomach ulcers, Hypertension, Hypothyroidism, Lumbar neuritis, Post laminectomy syndrome, Spinal deformity, and Thyroid disease. Social History:   reports that she quit smoking about 8 years ago. She has a 15.00 pack-year smoking history. She has never used smokeless tobacco. She reports current alcohol use. She reports that she does not use drugs. Family History:   Family History   Problem Relation Age of Onset    Hypertension Mother     Heart Disease Mother     Cancer Mother        Review of Systems:     Review of Systems   Constitutional: Positive for activity change (Remains diminished) and fatigue (Exercise capacity reduced). Negative for chills and fever. Respiratory: Negative for cough and shortness of breath. Cardiovascular: Negative for chest pain and palpitations. Gastrointestinal: Negative for abdominal pain, constipation, nausea and vomiting. Genitourinary: Positive for difficulty urinating (Catheter remains in place). Negative for flank pain and hematuria. Musculoskeletal: Positive for back pain (Incisional pain rated 6/10) and gait problem. Patient has had chronic neck and back pain  She follows with Dr Juan Carlos Clemens for pain management and methadone   Neurological: Positive for weakness (legs still weak ). Physical Examination:        Vitals  /61   Pulse 97   Temp 98.8 °F (37.1 °C) (Oral)   Resp 14   SpO2 94%   Temp (24hrs), Av °F (37.2 °C), Min:98.1 °F (36.7 °C), Max:100.6 °F (38.1 °C)    No results for input(s): POCGLU in the last 72 hours. Physical Exam  Vitals reviewed. Constitutional:       General: She is not in acute distress. Appearance: She is not diaphoretic. HENT:      Head: Normocephalic. Nose: Nose normal.   Eyes:      General: No scleral icterus. Conjunctiva/sclera: Conjunctivae normal.   Neck:      Trachea: No tracheal deviation. Cardiovascular:      Rate and Rhythm: Normal rate and regular rhythm. Pulmonary:      Effort: Pulmonary effort is normal. No respiratory distress. Breath sounds: Normal breath sounds. No wheezing or rales. Chest:      Chest wall: No tenderness. Abdominal:      General: There is no distension. Palpations: Abdomen is soft. Tenderness: There is no abdominal tenderness. There is no guarding. Genitourinary:     Comments: Bruce collecting yellow urine  Musculoskeletal:         General: No tenderness. Cervical back: Neck supple. Skin:     General: Skin is warm and dry. Neurological:      Mental Status: She is alert and oriented to person, place, and time. Medications: Allergies: Allergies   Allergen Reactions    Latex Hives, Itching, Dermatitis and Rash    Kiwi Extract      Other reaction(s): Facial Swelling       Current Meds:   Scheduled Meds:    sodium chloride flush  5-40 mL IntraVENous 2 times per day    enoxaparin  40 mg SubCUTAneous Daily    ciprofloxacin  500 mg Oral 2 times per day    bethanechol  25 mg Oral Q6H    polyethylene glycol  17 g Oral BID    magnesium hydroxide  15 mL Oral Q6H    sennosides-docusate sodium  2 tablet Oral Daily    ferrous sulfate  325 mg Oral BID WC    methadone  10 mg Oral BID    busPIRone  30 mg Oral BID    dilTIAZem  180 mg Oral Daily    levothyroxine  88 mcg Oral Daily    pantoprazole  40 mg Oral Daily    baclofen  10 mg Oral TID    bupivacaine liposome  20 mL Infiltration Once     Continuous Infusions:    sodium chloride       PRN Meds: sodium chloride flush, sodium chloride, ondansetron **OR** ondansetron, bisacodyl, oxyCODONE, oxyCODONE    Data:     I/O (24Hr):     Intake/Output Summary (Last 24 hours) at 1/3/2022 1300  Last data filed at 1/3/2022 0620  Gross per 24 hour   Intake 460 ml   Output 1510 ml   Net -1050 ml       Labs:  Hematology:  Recent Labs     01/02/22  0606 01/03/22  0508   WBC 10.1 9.0   RBC 3.01* 2.86*   HGB 9.7* 9.2*   HCT 30.5* 28.9*   .3 101.0   MCH 32.2 32.2   MCHC 31.8 31.8   RDW 12.9 12.9    277   MPV 9.6 9.5     Chemistry:  Recent Labs     01/02/22  0606 01/03/22  0508    136   K 3.8 3.8    98   CO2 25 24   GLUCOSE 102* 100*   BUN 13 13   CREATININE 0.62 0.69   ANIONGAP 13 14   LABGLOM >60 >60   GFRAA >60 >60   CALCIUM 8.3* 8.7   No results for input(s): PROT, LABALBU, LABA1C, N3QLOBA, A0WEZWD, FT4, TSH, AST, ALT, LDH, GGT, ALKPHOS, LABGGT, BILITOT, BILIDIR, AMMONIA, AMYLASE, LIPASE, LACTATE, CHOL, HDL, LDLCHOLESTEROL, CHOLHDLRATIO, TRIG, VLDL, VMW31LE, PHENYTOIN, PHENYF, URICACID, POCGLU in the last 72 hours. ABG:No results found for: POCPH, PHART, PH, POCPCO2, VUJ3UGV, PCO2, POCPO2, PO2ART, PO2, POCHCO3, GSN4EXZ, HCO3, NBEA, PBEA, BEART, BE, THGBART, THB, JIN1GNP, KNNW0UFT, S7KJOSFN, O2SAT, FIO2  Lab Results   Component Value Date/Time    SPECIAL NOT REPORTED 01/01/2022 10:20 AM     Lab Results   Component Value Date/Time    CULTURE PSEUDOMONAS AERUGINOSA >906644 CFU/ML (A) 01/01/2022 10:20 AM       Radiology:  XR ABDOMEN (KUB) (SINGLE AP VIEW)    Result Date: 1/2/2022  1. Gaseous distension of small and large bowel which could be related to an ileus versus bowel obstruction.        Assessment:        Primary Problem  Cauda equina compression [facility])    Active Hospital Problems    Diagnosis Date Noted    Pseudomonas urinary tract infection [N39.0, B96.5] 01/02/2022    Hypocalcemia [E83.51] 01/02/2022    Iron deficiency [E61.1] 01/01/2022    Anemia, normocytic normochromic [D64.9] 12/31/2021    Cauda equina compression ([address] [G83.4] 12/30/2021    Hypothyroidism [E03.9] 09/08/2016    Essential hypertension [I10] 12/01/2015       Plan:        NS eval & f/u as scheduled   Pain management, Methadone - f/u Dr Palomino Flavors  Antibiotics per C&S Results, Zosyn transitioned to Cipro   Observe for urine retention (mario in place)  Observe for ileus   Laxatives as needed  Blood

## 2022-01-03 NOTE — PLAN OF CARE
Output by Drain (mL) 01/01/22 0701 - 01/01/22 1500 01/01/22 1501 - 01/01/22 2300 01/01/22 2301 - 01/02/22 0700 01/02/22 0701 - 01/02/22 1500 01/02/22 1501 - 01/02/22 2300 01/02/22 2301 - 01/03/22 0700 01/03/22 0701 - 01/03/22 1500 01/03/22 1501 - 01/03/22 1701   Closed/Suction Drain Inferior;Midline Back 7 Wallisian 3 35 45  80 30         Drain Removal Note    []Subdural Drain   [x]TRAMAINE Drain  []Ventriculostomy Drain    Drain removed from suction, prepped with betadine and sterile towels placed to create sterile field. Drain suture cut and drain removed. A island dressing placed over drain hole site with hemostasis. No complications, patient tolerated procedure well.     --  Michael Adams CNP  5:01 PM EST

## 2022-01-03 NOTE — PROGRESS NOTES
Physical Therapy  Facility/Department: [address] ORTHO/MED SURG  Daily Treatment Note  NAME: Zoila Han  : 1960  MRN: [id number]    Date of Service: 1/3/2022    Discharge Recommendations:  Patient would benefit from continued therapy after discharge   PT Equipment Recommendations  Equipment Needed: No    Assessment   Assessment: Pt required mod-A for bed mobility for BLE progression and trunk stability. Pt sat EOB for 8 mins with CGA with no LOB noted. Pt attempted STS transfer with CGA but was unable to complete without min-A. Pt ambulated 30 ft. with CGA using a RW and required increased time and effort to complete due to slow christine. Pt demos a step to gait pattern and was provided with verbal cues to step through during swing phase of gait. Pt c/o increased pain with mobility rating it a 5/10 on numerical pain scale. Pt is limited by decreased BLE strength and would benefit from continued PT following discharge to address functional deficits. Prognosis: Good  Decision Making: Medium Complexity  PT Education: Goals;PT Role;Plan of Care;Gait Training;Transfer Training;Functional Mobility Training  REQUIRES PT FOLLOW UP: Yes  Activity Tolerance  Activity Tolerance: Patient limited by pain; Patient Tolerated treatment well     Patient Diagnosis(es): The encounter diagnosis was Cauda equina compression ([address] has a past medical history of Anxiety, Arthritis, At maximum risk for fall, Cancer ([address] Cauda equina syndrome (HCC), Cervical stenosis of spine, GERD (gastroesophageal reflux disease), History of stomach ulcers, Hypertension, Hypothyroidism, Lumbar neuritis, Post laminectomy syndrome, Spinal deformity, and Thyroid disease. has a past surgical history that includes back surgery; hernia repair (Bilateral); Breast surgery (Right); cervical laminectomy (2014);  Mastectomy, radical; and Hysterectomy, total abdominal.    Restrictions  Restrictions/Precautions  Restrictions/Precautions: Fall Modifier : CL (01/03/22 1018)          Goals  Short term goals  Time Frame for Short term goals: 14 visits  Short term goal 1: Pt will ambulate 200 feet with a RW and SBA to increase functional independence. Short term goal 2: Pt will tolerate a 35 minute therapy session to promote increased endurance. Short term goal 3: Pt will demonstrate good- standing balance to decrease fall risk. Short term goal 4: Pt will perform sit<>stand transfer with supervision to increase functional independence. Short term goal 5: Pt will negotiate 1 stair with no handrail and SBA to allow the pt to enter prior living arrangements.     Plan    Plan  Times per week: 5-6x  Times per day: Daily  Current Treatment Recommendations: Strengthening,ROM,Stair training,Balance Training,Gait Training,Patient/Caregiver Education & Training,Equipment Evaluation, Education, & procurement,Neuromuscular Re-education,Functional Mobility Training,Endurance Training,Transfer Training,Safety Education & Training,Home Exercise Program  Safety Devices  Type of devices: Call light within reach,Gait belt,Patient at risk for falls,Left in chair,Nurse notified  Restraints  Initially in place: No     Therapy Time   Individual Concurrent Group Co-treatment   Time In 0945         Time Out 1010         Minutes 25         Timed Code Treatment Minutes: Rob 17, PTA

## 2022-01-03 NOTE — PROGRESS NOTES
Occupational Therapy  Facility/Department: [address] ORTHO/MED SURG  Daily Treatment Note  NAME: Abebe Dash  : 1960  MRN: [id number]    Date of Service: 1/3/2022    Discharge Recommendations:  Patient would benefit from continued therapy after discharge       Assessment   Performance deficits / Impairments: Decreased functional mobility ; Decreased ADL status; Decreased endurance;Decreased high-level IADLs;Decreased balance  Assessment: Pt required increased physical assistance for all functional transfers/mobility and ADLs on this date. pt would benefit from further therapy at discharge in order to increase safety and independence. Treatment Diagnosis: Lumbar laminectomy  Prognosis: Good  REQUIRES OT FOLLOW UP: Yes  Activity Tolerance  Activity Tolerance: Patient Tolerated treatment well;Patient limited by fatigue  Safety Devices  Safety Devices in place: Yes  Type of devices: Call light within reach;Gait belt;Patient at risk for falls; Left in chair;Nurse notified         Patient Diagnosis(es): The encounter diagnosis was Cauda equina compression ([address] has a past medical history of Anxiety, Arthritis, At maximum risk for fall, Cancer ([address] Cauda equina syndrome (HCC), Cervical stenosis of spine, GERD (gastroesophageal reflux disease), History of stomach ulcers, Hypertension, Hypothyroidism, Lumbar neuritis, Post laminectomy syndrome, Spinal deformity, and Thyroid disease. has a past surgical history that includes back surgery; hernia repair (Bilateral); Breast surgery (Right); cervical laminectomy (2014); Mastectomy, radical; Hysterectomy, total abdominal; and Lumbar spine surgery (N/A, 2021). Restrictions  Restrictions/Precautions  Restrictions/Precautions: General Precautions,Surgical Protocols,Fall Risk,Up as Tolerated  Required Braces or Orthoses?: No  Position Activity Restriction  Other position/activity restrictions:  Activity as tolerated, parish drain, mario cath  Subjective gross LOB  Toilet Transfers  Toilet - Technique: Ambulating  Equipment Used: Standard bedside commode  Toilet Transfer: Minimal assistance  Toilet Transfers Comments: Unsteady, no gross LOB w/RW  Bed mobility  Rolling to Left: Contact guard assistance (use of bed rail)  Supine to Sit: Minimal assistance (HOB elevated and use of bed rail for trunk progession)  Sit to Supine: Unable to assess (left up in chair)  Scooting:  Moderate assistance  Transfers  Stand Step Transfers: Minimal assistance  Sit to stand: Minimal assistance  Stand to sit: Minimal assistance  Transfer Comments: w/RW and vc's for hand placement     Cognition  Overall Cognitive Status: [facility]    Plan   Plan  Times per week: 3-4x/wk     AM-PAC Score  AM-PAC Inpatient Daily Activity Raw Score: 16 (01/03/22 1547)  AM-PAC Inpatient ADL T-Scale Score : 35.96 (01/03/22 1547)  ADL Inpatient CMS 0-100% Score: 53.32 (01/03/22 1547)  ADL Inpatient CMS G-Code Modifier : CK (01/03/22 1547)    Goals  Short term goals  Time Frame for Short term goals: pt will, by discharge  Short term goal 1: complete LB ADLs and toileting tasks with min A, set up and AE, as needed  Short term goal 2: complete UB ADLs with supervision and set up  Short term goal 3: increase activity  tolerance to 20+ minutes in order to participate in daily tasks  Short term goal 4: dem CGA During functional transfers/functional mobility with LRD, as needed  Short term goal 5: dem ~6 minutes dynamic standing balance with CGA and LRD in order to complete functional tasks     Therapy Time   Individual Concurrent Group Co-treatment   Time In  1500         Time Out  1530         Minutes  30 total tx time                 ABHILASH CONNER/NARDA

## 2022-01-03 NOTE — PROGRESS NOTES
Neurosurgery AILEEN/Resident    Daily Progress Note   Cauda equina, POD #4 lumbar laminectomy L2-S1.    1/3/2022  8:04 AM    Chart reviewed. No acute events overnight. No new complaints. Post-op pain controlled. Tolerating PO. Large BM last night. Bruce remains in place. Minimal ambulation. PMR has recommended acute rehab placement. Vitals:    01/03/22 0045 01/03/22 0100 01/03/22 0430 01/03/22 0734   BP:    135/74   Pulse: 90 89 82 100   Resp: 13   12   Temp:    98.1 °F (36.7 °C)   TempSrc:    Oral   SpO2: 94%   95%         PE:   AOx3   CNII-XII intact   PERRL, EOMI     Motor   L deltoid 5/5; R deltoid 5/5  L biceps 5/5; R biceps 5/5  L triceps 5/5; R triceps 5/5  L wrist extension 5/5; R wrist extension 5/5  L intrinsics 5/5; R intrinsics 5/5      L iliopsoas 4/5 , R iliopsoas 5/5  L quadriceps 4/5; R quadriceps 5/5  L Dorsiflexion 4/5; R dorsiflexion 4+/5  L Plantarflexion 4/5; R plantarflexion 4+/5  L EHL 4/5; R EHL 4/5    Sensation: decreased BLE    Drain output: 30mL/12h  Incision: CDI      Lab Results   Component Value Date    WBC 9.0 01/03/2022    HGB 9.2 (L) 01/03/2022    HCT 28.9 (L) 01/03/2022     01/03/2022    CHOL 198 07/23/2020    TRIG 96 07/23/2020     (A) 07/23/2020     01/03/2022    K 3.8 01/03/2022    CL 98 01/03/2022    CREATININE 0.69 01/03/2022    BUN 13 01/03/2022    CO2 24 01/03/2022    GLUF 109 07/23/2020       Radiology   XR ABDOMEN (KUB) (SINGLE AP VIEW)    Result Date: 1/2/2022  EXAMINATION: ONE SUPINE XRAY VIEW(S) OF THE ABDOMEN 1/2/2022 8:43 am COMPARISON: None HISTORY: ORDERING SYSTEM PROVIDED HISTORY: constipation post opp TECHNOLOGIST PROVIDED HISTORY: constipation post opp Reason for Exam: port supine FINDINGS: There is gaseous distension of small and large bowel which could be related to an ileus versus bowel obstruction. There is a catheter noted in the region of the bladder. There is a drainage catheter along the midline of the lower spine at L3-L5. Asymmetric degenerative changes are noted in the right hip. No renal calculi. 1. Gaseous distension of small and large bowel which could be related to an ileus versus bowel obstruction. FLUORO FOR SURGICAL PROCEDURES    Result Date: 12/30/2021  Radiology exam is complete. No Radiologist dictation. Please follow up with ordering provider. A/P  62 y.o. female who presents with cauda equina, POD #4 lumbar laminectomy L2-S1.     - BM overnight  - Baclofen 10 mg TID  - Urecholine 25 mg 4 times daily  - Roxicodone 5-10mg PRN  - Bruce catheter-urine culture +pseudomonas; treating with zosyn  - Resume home dose methadone  - PMR consult-recommend ARU placement when medically appropriate  - DVT prophylaxis: lovenox, SCDs  - Diet: General  - Encourage incentive spirometry  - D/C drain today  - Neuro checks per floor protocol  - PT/OT, encourage ambulation      Please contact neurosurgery with any changes in patients neurologic status.        CASSANDRA Gaona-CNP  1/3/22  8:04 AM

## 2022-01-04 ENCOUNTER — HOSPITAL ENCOUNTER (INPATIENT)
Age: 62
LOS: 10 days | Discharge: HOME | DRG: 560 | End: 2022-01-14
Attending: PHYSICAL MEDICINE & REHABILITATION | Admitting: PHYSICAL MEDICINE & REHABILITATION
Payer: MEDICARE

## 2022-01-04 VITALS
RESPIRATION RATE: 16 BRPM | HEART RATE: 97 BPM | SYSTOLIC BLOOD PRESSURE: 133 MMHG | TEMPERATURE: 98.1 F | OXYGEN SATURATION: 96 % | DIASTOLIC BLOOD PRESSURE: 70 MMHG

## 2022-01-04 DIAGNOSIS — M48.062 LUMBAR STENOSIS WITH NEUROGENIC CLAUDICATION: ICD-10-CM

## 2022-01-04 DIAGNOSIS — G83.4 CAUDA EQUINA SYNDROME (HCC): Primary | ICD-10-CM

## 2022-01-04 LAB
ABSOLUTE EOS #: 0.12 K/UL (ref 0–0.44)
ABSOLUTE IMMATURE GRANULOCYTE: 0.17 K/UL (ref 0–0.3)
ABSOLUTE LYMPH #: 1.15 K/UL (ref 1.1–3.7)
ABSOLUTE MONO #: 0.85 K/UL (ref 0.1–1.2)
BASOPHILS # BLD: 1 % (ref 0–2)
BASOPHILS ABSOLUTE: 0.06 K/UL (ref 0–0.2)
DIFFERENTIAL TYPE: ABNORMAL
EOSINOPHILS RELATIVE PERCENT: 1 % (ref 1–4)
HCT VFR BLD CALC: 30 % (ref 36.3–47.1)
HEMOGLOBIN: 9.7 G/DL (ref 11.9–15.1)
IMMATURE GRANULOCYTES: 2 %
LYMPHOCYTES # BLD: 12 % (ref 24–43)
MCH RBC QN AUTO: 32.8 PG (ref 25.2–33.5)
MCHC RBC AUTO-ENTMCNC: 32.3 G/DL (ref 28.4–34.8)
MCV RBC AUTO: 101.4 FL (ref 82.6–102.9)
MONOCYTES # BLD: 9 % (ref 3–12)
NRBC AUTOMATED: 0 PER 100 WBC
PDW BLD-RTO: 12.5 % (ref 11.8–14.4)
PLATELET # BLD: 283 K/UL (ref 138–453)
PLATELET ESTIMATE: ABNORMAL
PMV BLD AUTO: 9.7 FL (ref 8.1–13.5)
RBC # BLD: 2.96 M/UL (ref 3.95–5.11)
RBC # BLD: ABNORMAL 10*6/UL
SEG NEUTROPHILS: 75 % (ref 36–65)
SEGMENTED NEUTROPHILS ABSOLUTE COUNT: 7.5 K/UL (ref 1.5–8.1)
WBC # BLD: 9.9 K/UL (ref 3.5–11.3)
WBC # BLD: ABNORMAL 10*3/UL

## 2022-01-04 PROCEDURE — 6370000000 HC RX 637 (ALT 250 FOR IP): Performed by: STUDENT IN AN ORGANIZED HEALTH CARE EDUCATION/TRAINING PROGRAM

## 2022-01-04 PROCEDURE — 99232 SBSQ HOSP IP/OBS MODERATE 35: CPT | Performed by: INTERNAL MEDICINE

## 2022-01-04 PROCEDURE — 6370000000 HC RX 637 (ALT 250 FOR IP): Performed by: INTERNAL MEDICINE

## 2022-01-04 PROCEDURE — 85025 COMPLETE CBC W/AUTO DIFF WBC: CPT

## 2022-01-04 PROCEDURE — 1180000000 HC REHAB R&B

## 2022-01-04 PROCEDURE — APPSS30 APP SPLIT SHARED TIME 16-30 MINUTES: Performed by: NURSE PRACTITIONER

## 2022-01-04 PROCEDURE — 97110 THERAPEUTIC EXERCISES: CPT

## 2022-01-04 PROCEDURE — 36415 COLL VENOUS BLD VENIPUNCTURE: CPT

## 2022-01-04 PROCEDURE — APPSS15 APP SPLIT SHARED TIME 0-15 MINUTES: Performed by: NURSE PRACTITIONER

## 2022-01-04 PROCEDURE — 6360000002 HC RX W HCPCS: Performed by: STUDENT IN AN ORGANIZED HEALTH CARE EDUCATION/TRAINING PROGRAM

## 2022-01-04 PROCEDURE — 99232 SBSQ HOSP IP/OBS MODERATE 35: CPT | Performed by: STUDENT IN AN ORGANIZED HEALTH CARE EDUCATION/TRAINING PROGRAM

## 2022-01-04 PROCEDURE — 97116 GAIT TRAINING THERAPY: CPT

## 2022-01-04 PROCEDURE — 51798 US URINE CAPACITY MEASURE: CPT

## 2022-01-04 PROCEDURE — 2580000003 HC RX 258: Performed by: STUDENT IN AN ORGANIZED HEALTH CARE EDUCATION/TRAINING PROGRAM

## 2022-01-04 PROCEDURE — 6370000000 HC RX 637 (ALT 250 FOR IP): Performed by: NURSE PRACTITIONER

## 2022-01-04 RX ORDER — POLYETHYLENE GLYCOL 3350 17 G/17G
17 POWDER, FOR SOLUTION ORAL 2 TIMES DAILY
Status: DISCONTINUED | OUTPATIENT
Start: 2022-01-04 | End: 2022-01-04 | Stop reason: SDUPTHER

## 2022-01-04 RX ORDER — OXYCODONE HYDROCHLORIDE 5 MG/1
10 TABLET ORAL EVERY 4 HOURS PRN
Status: CANCELLED | OUTPATIENT
Start: 2022-01-04

## 2022-01-04 RX ORDER — POLYETHYLENE GLYCOL 3350 17 G/17G
17 POWDER, FOR SOLUTION ORAL DAILY
Status: DISCONTINUED | OUTPATIENT
Start: 2022-01-04 | End: 2022-01-11

## 2022-01-04 RX ORDER — LANOLIN ALCOHOL/MO/W.PET/CERES
325 CREAM (GRAM) TOPICAL 2 TIMES DAILY WITH MEALS
Status: CANCELLED | OUTPATIENT
Start: 2022-01-04

## 2022-01-04 RX ORDER — BUSPIRONE HYDROCHLORIDE 15 MG/1
30 TABLET ORAL 2 TIMES DAILY
Status: CANCELLED | OUTPATIENT
Start: 2022-01-04

## 2022-01-04 RX ORDER — BISACODYL 10 MG
10 SUPPOSITORY, RECTAL RECTAL DAILY PRN
Status: DISCONTINUED | OUTPATIENT
Start: 2022-01-04 | End: 2022-01-06

## 2022-01-04 RX ORDER — OXYCODONE HYDROCHLORIDE 5 MG/1
5 TABLET ORAL EVERY 4 HOURS PRN
Status: DISCONTINUED | OUTPATIENT
Start: 2022-01-04 | End: 2022-01-06

## 2022-01-04 RX ORDER — CIPROFLOXACIN 500 MG/1
500 TABLET, FILM COATED ORAL EVERY 12 HOURS SCHEDULED
Status: COMPLETED | OUTPATIENT
Start: 2022-01-04 | End: 2022-01-13

## 2022-01-04 RX ORDER — POLYETHYLENE GLYCOL 3350 17 G/17G
17 POWDER, FOR SOLUTION ORAL 2 TIMES DAILY
Status: CANCELLED | OUTPATIENT
Start: 2022-01-04

## 2022-01-04 RX ORDER — DILTIAZEM HYDROCHLORIDE 90 MG/1
180 CAPSULE, EXTENDED RELEASE ORAL DAILY
Status: DISCONTINUED | OUTPATIENT
Start: 2022-01-04 | End: 2022-01-14 | Stop reason: HOSPADM

## 2022-01-04 RX ORDER — ONDANSETRON 4 MG/1
4 TABLET, ORALLY DISINTEGRATING ORAL EVERY 8 HOURS PRN
Status: CANCELLED | OUTPATIENT
Start: 2022-01-04

## 2022-01-04 RX ORDER — PANTOPRAZOLE SODIUM 40 MG/1
40 TABLET, DELAYED RELEASE ORAL DAILY
Status: CANCELLED | OUTPATIENT
Start: 2022-01-04

## 2022-01-04 RX ORDER — ONDANSETRON 4 MG/1
4 TABLET, ORALLY DISINTEGRATING ORAL EVERY 8 HOURS PRN
Status: DISCONTINUED | OUTPATIENT
Start: 2022-01-04 | End: 2022-01-14 | Stop reason: HOSPADM

## 2022-01-04 RX ORDER — DILTIAZEM HYDROCHLORIDE 90 MG/1
180 CAPSULE, EXTENDED RELEASE ORAL DAILY
Status: CANCELLED | OUTPATIENT
Start: 2022-01-04

## 2022-01-04 RX ORDER — LEVOTHYROXINE SODIUM 88 UG/1
88 TABLET ORAL DAILY
Status: DISCONTINUED | OUTPATIENT
Start: 2022-01-05 | End: 2022-01-14 | Stop reason: HOSPADM

## 2022-01-04 RX ORDER — ACETAMINOPHEN 325 MG/1
650 TABLET ORAL EVERY 4 HOURS PRN
Status: DISCONTINUED | OUTPATIENT
Start: 2022-01-04 | End: 2022-01-14 | Stop reason: HOSPADM

## 2022-01-04 RX ORDER — ONDANSETRON 2 MG/ML
4 INJECTION INTRAMUSCULAR; INTRAVENOUS EVERY 6 HOURS PRN
Status: DISCONTINUED | OUTPATIENT
Start: 2022-01-04 | End: 2022-01-04

## 2022-01-04 RX ORDER — ONDANSETRON 2 MG/ML
4 INJECTION INTRAMUSCULAR; INTRAVENOUS EVERY 6 HOURS PRN
Status: CANCELLED | OUTPATIENT
Start: 2022-01-04

## 2022-01-04 RX ORDER — BETHANECHOL CHLORIDE 25 MG/1
25 TABLET ORAL EVERY 6 HOURS
Status: DISCONTINUED | OUTPATIENT
Start: 2022-01-04 | End: 2022-01-06

## 2022-01-04 RX ORDER — BACLOFEN 10 MG/1
10 TABLET ORAL 3 TIMES DAILY
Status: DISCONTINUED | OUTPATIENT
Start: 2022-01-04 | End: 2022-01-14 | Stop reason: HOSPADM

## 2022-01-04 RX ORDER — BISACODYL 10 MG
10 SUPPOSITORY, RECTAL RECTAL DAILY PRN
Status: DISCONTINUED | OUTPATIENT
Start: 2022-01-04 | End: 2022-01-04

## 2022-01-04 RX ORDER — BACLOFEN 10 MG/1
10 TABLET ORAL 3 TIMES DAILY
Status: CANCELLED | OUTPATIENT
Start: 2022-01-04

## 2022-01-04 RX ORDER — OXYCODONE HYDROCHLORIDE 5 MG/1
5 TABLET ORAL EVERY 4 HOURS PRN
Status: CANCELLED | OUTPATIENT
Start: 2022-01-04

## 2022-01-04 RX ORDER — FERROUS SULFATE 325(65) MG
325 TABLET ORAL 2 TIMES DAILY WITH MEALS
Status: DISCONTINUED | OUTPATIENT
Start: 2022-01-04 | End: 2022-01-14 | Stop reason: HOSPADM

## 2022-01-04 RX ORDER — CIPROFLOXACIN 500 MG/1
500 TABLET, FILM COATED ORAL EVERY 12 HOURS SCHEDULED
Status: CANCELLED | OUTPATIENT
Start: 2022-01-04

## 2022-01-04 RX ORDER — SENNA PLUS 8.6 MG/1
2 TABLET ORAL DAILY PRN
Status: DISCONTINUED | OUTPATIENT
Start: 2022-01-04 | End: 2022-01-06

## 2022-01-04 RX ORDER — OXYCODONE HYDROCHLORIDE 10 MG/1
10 TABLET ORAL EVERY 4 HOURS PRN
Status: DISCONTINUED | OUTPATIENT
Start: 2022-01-04 | End: 2022-01-06

## 2022-01-04 RX ORDER — PANTOPRAZOLE SODIUM 40 MG/1
40 TABLET, DELAYED RELEASE ORAL DAILY
Status: DISCONTINUED | OUTPATIENT
Start: 2022-01-04 | End: 2022-01-14 | Stop reason: HOSPADM

## 2022-01-04 RX ORDER — METHADONE HYDROCHLORIDE 10 MG/1
10 TABLET ORAL 2 TIMES DAILY
Status: DISCONTINUED | OUTPATIENT
Start: 2022-01-04 | End: 2022-01-14 | Stop reason: HOSPADM

## 2022-01-04 RX ORDER — LEVOTHYROXINE SODIUM 88 UG/1
88 TABLET ORAL DAILY
Status: CANCELLED | OUTPATIENT
Start: 2022-01-05

## 2022-01-04 RX ORDER — SENNA AND DOCUSATE SODIUM 50; 8.6 MG/1; MG/1
2 TABLET, FILM COATED ORAL DAILY
Status: DISCONTINUED | OUTPATIENT
Start: 2022-01-04 | End: 2022-01-06

## 2022-01-04 RX ORDER — BETHANECHOL CHLORIDE 25 MG/1
25 TABLET ORAL EVERY 6 HOURS
Status: CANCELLED | OUTPATIENT
Start: 2022-01-04

## 2022-01-04 RX ORDER — METHADONE HYDROCHLORIDE 10 MG/1
10 TABLET ORAL 2 TIMES DAILY
Status: CANCELLED | OUTPATIENT
Start: 2022-01-04

## 2022-01-04 RX ORDER — BISACODYL 10 MG
10 SUPPOSITORY, RECTAL RECTAL DAILY PRN
Status: CANCELLED | OUTPATIENT
Start: 2022-01-04

## 2022-01-04 RX ORDER — SENNA AND DOCUSATE SODIUM 50; 8.6 MG/1; MG/1
2 TABLET, FILM COATED ORAL DAILY
Status: CANCELLED | OUTPATIENT
Start: 2022-01-04

## 2022-01-04 RX ORDER — BUSPIRONE HYDROCHLORIDE 15 MG/1
30 TABLET ORAL 2 TIMES DAILY
Status: DISCONTINUED | OUTPATIENT
Start: 2022-01-04 | End: 2022-01-14 | Stop reason: HOSPADM

## 2022-01-04 RX ADMIN — BETHANECHOL CHLORIDE 25 MG: 25 TABLET ORAL at 21:41

## 2022-01-04 RX ADMIN — OXYCODONE HYDROCHLORIDE 10 MG: 5 TABLET ORAL at 15:17

## 2022-01-04 RX ADMIN — METHADONE HYDROCHLORIDE 10 MG: 10 TABLET ORAL at 21:42

## 2022-01-04 RX ADMIN — BETHANECHOL CHLORIDE 25 MG: 25 TABLET ORAL at 08:11

## 2022-01-04 RX ADMIN — SODIUM CHLORIDE, PRESERVATIVE FREE 10 ML: 5 INJECTION INTRAVENOUS at 08:13

## 2022-01-04 RX ADMIN — BACLOFEN 10 MG: 10 TABLET ORAL at 08:11

## 2022-01-04 RX ADMIN — BUSPIRONE HYDROCHLORIDE 30 MG: 15 TABLET ORAL at 08:11

## 2022-01-04 RX ADMIN — BACLOFEN 10 MG: 10 TABLET ORAL at 15:17

## 2022-01-04 RX ADMIN — PANTOPRAZOLE SODIUM 40 MG: 40 TABLET, DELAYED RELEASE ORAL at 08:11

## 2022-01-04 RX ADMIN — Medication 15 ML: at 08:10

## 2022-01-04 RX ADMIN — BACLOFEN 10 MG: 10 TABLET ORAL at 21:41

## 2022-01-04 RX ADMIN — OXYCODONE HYDROCHLORIDE 10 MG: 10 TABLET ORAL at 21:41

## 2022-01-04 RX ADMIN — BUSPIRONE HYDROCHLORIDE 30 MG: 15 TABLET ORAL at 21:41

## 2022-01-04 RX ADMIN — DOCUSATE SODIUM 50MG AND SENNOSIDES 8.6MG 2 TABLET: 8.6; 5 TABLET, FILM COATED ORAL at 08:12

## 2022-01-04 RX ADMIN — METHADONE HYDROCHLORIDE 10 MG: 10 TABLET ORAL at 08:15

## 2022-01-04 RX ADMIN — BETHANECHOL CHLORIDE 25 MG: 25 TABLET ORAL at 15:17

## 2022-01-04 RX ADMIN — FERROUS SULFATE TAB EC 325 MG (65 MG FE EQUIVALENT) 325 MG: 325 (65 FE) TABLET DELAYED RESPONSE at 15:17

## 2022-01-04 RX ADMIN — BETHANECHOL CHLORIDE 25 MG: 25 TABLET ORAL at 03:41

## 2022-01-04 RX ADMIN — CIPROFLOXACIN 500 MG: 500 TABLET, FILM COATED ORAL at 08:12

## 2022-01-04 RX ADMIN — DILTIAZEM HYDROCHLORIDE 180 MG: 90 CAPSULE, EXTENDED RELEASE ORAL at 08:13

## 2022-01-04 RX ADMIN — LEVOTHYROXINE SODIUM 88 MCG: 88 TABLET ORAL at 08:12

## 2022-01-04 RX ADMIN — CIPROFLOXACIN 500 MG: 500 TABLET, FILM COATED ORAL at 21:42

## 2022-01-04 RX ADMIN — OXYCODONE HYDROCHLORIDE 10 MG: 5 TABLET ORAL at 03:41

## 2022-01-04 RX ADMIN — ENOXAPARIN SODIUM 40 MG: 100 INJECTION SUBCUTANEOUS at 08:10

## 2022-01-04 RX ADMIN — Medication 15 ML: at 03:43

## 2022-01-04 RX ADMIN — FERROUS SULFATE TAB EC 325 MG (65 MG FE EQUIVALENT) 325 MG: 325 (65 FE) TABLET DELAYED RESPONSE at 08:12

## 2022-01-04 ASSESSMENT — PAIN DESCRIPTION - DESCRIPTORS
DESCRIPTORS: RADIATING;OTHER (COMMENT)
DESCRIPTORS: DISCOMFORT

## 2022-01-04 ASSESSMENT — PAIN DESCRIPTION - PAIN TYPE
TYPE: ACUTE PAIN
TYPE: SURGICAL PAIN
TYPE: SURGICAL PAIN

## 2022-01-04 ASSESSMENT — PAIN DESCRIPTION - ONSET: ONSET: ON-GOING

## 2022-01-04 ASSESSMENT — PAIN SCALES - GENERAL
PAINLEVEL_OUTOF10: 7
PAINLEVEL_OUTOF10: 8
PAINLEVEL_OUTOF10: 0
PAINLEVEL_OUTOF10: 7
PAINLEVEL_OUTOF10: 3
PAINLEVEL_OUTOF10: 2
PAINLEVEL_OUTOF10: 2
PAINLEVEL_OUTOF10: 10

## 2022-01-04 ASSESSMENT — PAIN DESCRIPTION - LOCATION
LOCATION: BACK

## 2022-01-04 ASSESSMENT — PAIN DESCRIPTION - PROGRESSION: CLINICAL_PROGRESSION: GRADUALLY IMPROVING

## 2022-01-04 ASSESSMENT — PAIN DESCRIPTION - DIRECTION: RADIATING_TOWARDS: LEGS

## 2022-01-04 ASSESSMENT — PAIN DESCRIPTION - ORIENTATION
ORIENTATION: MID
ORIENTATION: MID;LOWER

## 2022-01-04 ASSESSMENT — PAIN - FUNCTIONAL ASSESSMENT: PAIN_FUNCTIONAL_ASSESSMENT: PREVENTS OR INTERFERES SOME ACTIVE ACTIVITIES AND ADLS

## 2022-01-04 ASSESSMENT — PAIN SCALES - WONG BAKER: WONGBAKER_NUMERICALRESPONSE: 0

## 2022-01-04 ASSESSMENT — ENCOUNTER SYMPTOMS: BACK PAIN: 1

## 2022-01-04 ASSESSMENT — PAIN DESCRIPTION - FREQUENCY: FREQUENCY: INTERMITTENT

## 2022-01-04 NOTE — PROGRESS NOTES
Physical Medicine & Rehabilitation  Progress Note        Admitting Physician:  Ronni Samuel DO    Primary Care Provider:  Crow Trivedi MD     Chief Complaint:  Bilateral lower limb weakness    Brief History: This is a follow up to the initial consult on Ms. Catracho Eddy who is a 62 y.o. female admitted to [facility] on 12/30/2021. She underwent L2-S1 laminectomy on 12/30/21 for cauda equina syndrome (Dr. Lori Gutierrez). Subjective:  She currently reports some back pain but denies any pain in the bilateral lower limbs. She notes numbness in the bilateral lower limbs as well as left-greater-than right bilateral lower limb weakness. She denies any changes in bowel or bladder control. ROS:  Review of Systems   Constitutional: Negative for fever. Gastrointestinal:        No change in bowel control   Genitourinary:        No change in bladder control   Musculoskeletal: Positive for back pain. Neurological: Positive for sensory change and weakness. Rehabilitation:   Progressing in therapies. PT:  Restrictions/Precautions: General Precautions,Surgical Protocols,Fall Risk,Up as Tolerated  Other position/activity restrictions: Activity as tolerated, parish drain, mario cath   Transfers  Sit to Stand: Minimal Assistance  Stand to sit: Minimal Assistance  Comment: Verbal cues for hand placement with good return. Ambulation 1  Surface: level tile  Device: Rolling Walker  Assistance: Contact guard assistance  Quality of Gait: unsteady, 1x muscle spasm RLE, decreased gait speed, no true LOB  Gait Deviations: Slow Sharon,Staggers,Shuffles  Distance: 30 ft. Transfers  Sit to Stand: Minimal Assistance  Stand to sit: Minimal Assistance  Comment: Verbal cues for hand placement with good return.   Ambulation  Ambulation?: Yes  More Ambulation?: No  Ambulation 1  Surface: level tile  Device: Rolling Walker  Assistance: Contact guard assistance  Quality of Gait: unsteady, 1x muscle spasm RLE, decreased gait speed, no true LOB  Gait Deviations: Slow Sharon,Staggers,Shuffles  Distance: 30 ft. Surface: level tile  Ambulation 1  Surface: level tile  Device: Rolling Walker  Assistance: Contact guard assistance  Quality of Gait: unsteady, 1x muscle spasm RLE, decreased gait speed, no true LOB  Gait Deviations: Slow Sharon,Staggers,Shuffles  Distance: 30 ft. OT:  ADL  Feeding: Stand by assistance,Setup,Dentures,Increased time to complete (able to open most containers and feed self)  Grooming: Stand by assistance,Moderate assistance,Setup,Verbal cueing,Increased time to complete (SBA-wash face, brush teeth, comb hair, Mod-wash/dry hair)  UE Bathing: Stand by assistance  LE Bathing: Moderate assistance  UE Dressing: Stand by assistance  LE Dressing: Moderate assistance  Toileting: Maximum assistance (assist to complete juanita care and to change brief)  Additional Comments: OT failitated pt in donning gown around back while sitting on EOB with SBA for safety and line management         Balance  Sitting Balance: Supervision (seated at EOB and in recliner unsupported)  Standing Balance: Minimal assistance (w/RW, unsteady with no gross LOB)   Standing Balance  Time: Pt stood for approx 3 min for juanita care and to transfer from [facility] to recliner  Activity: pt completed functional mobility to doorway and back to bed  Comment: w/RW, unsteady with no gross LOB  Functional Mobility  Functional - Mobility Device: Rolling Walker  Activity: Other  Assist Level: Minimal assistance  Functional Mobility Comments: Unsteady, no gross LOB     Bed mobility  Rolling to Left: Contact guard assistance (use of bed rail)  Supine to Sit: Minimal assistance (HOB elevated and use of bed rail for trunk progession)  Sit to Supine: Unable to assess (left up in chair)  Scooting:  Moderate assistance  Comment: pt ed on log roll tech with fair demo  Transfers  Stand Step Transfers: Minimal assistance  Sit to stand: Minimal assistance  Stand to sit: Minimal assistance  Transfer Comments: w/RW and vc's for hand placement    Toilet Transfers  Toilet - Technique: Ambulating  Equipment Used: Standard bedside commode  Toilet Transfer: Minimal assistance  Toilet Transfers Comments: Unsteady, no gross LOB w/RW             SLP:      Current Medications:   Current Facility-Administered Medications: sodium chloride flush 0.9 % injection 5-40 mL, 5-40 mL, IntraVENous, 2 times per day  sodium chloride flush 0.9 % injection 5-40 mL, 5-40 mL, IntraVENous, PRN  0.9 % sodium chloride infusion, 25 mL, IntraVENous, PRN  ondansetron (ZOFRAN-ODT) disintegrating tablet 4 mg, 4 mg, Oral, Q8H PRN **OR** ondansetron (ZOFRAN) injection 4 mg, 4 mg, IntraVENous, Q6H PRN  enoxaparin (LOVENOX) injection 40 mg, 40 mg, SubCUTAneous, Daily  bisacodyl (DULCOLAX) suppository 10 mg, 10 mg, Rectal, Daily PRN  ciprofloxacin (CIPRO) tablet 500 mg, 500 mg, Oral, 2 times per day  bethanechol (URECHOLINE) tablet 25 mg, 25 mg, Oral, Q6H  polyethylene glycol (GLYCOLAX) packet 17 g, 17 g, Oral, BID  magnesium hydroxide (MILK OF MAGNESIA) 400 MG/5ML suspension 15 mL, 15 mL, Oral, Q6H  sennosides-docusate sodium (SENOKOT-S) 8.6-50 MG tablet 2 tablet, 2 tablet, Oral, Daily  ferrous sulfate (FE TABS 325) EC tablet 325 mg, 325 mg, Oral, BID WC  methadone (DOLOPHINE) tablet 10 mg, 10 mg, Oral, BID  busPIRone (BUSPAR) tablet 30 mg, 30 mg, Oral, BID  dilTIAZem (CARDIZEM 12 HR) extended release capsule 180 mg, 180 mg, Oral, Daily  levothyroxine (SYNTHROID) tablet 88 mcg, 88 mcg, Oral, Daily  pantoprazole (PROTONIX) tablet 40 mg, 40 mg, Oral, Daily  baclofen (LIORESAL) tablet 10 mg, 10 mg, Oral, TID  EXPAREL 20ml mixed with 20ml of normal saline, 20 mL, Infiltration, Once  oxyCODONE (ROXICODONE) immediate release tablet 5 mg, 5 mg, Oral, Q4H PRN  oxyCODONE (ROXICODONE) immediate release tablet 10 mg, 10 mg, Oral, Q4H PRN    Objective:  /70   Pulse 97   Temp 98.1 °F (36.7 °C) (Oral)   Resp 16   SpO2 96% GEN: Well developed, well nourished, no acute distress  HEENT: NCAT. EOM grossly intact. Hearing grossly intact. Mucous membranes pink and moist.  RESP: Normal breath sounds with no wheezing, rales, or rhonchi. Respirations WNL and unlabored. CV: Regular rate and rhythm. No murmurs, rubs, or gallops. ABD: Soft, non-distended, BS+ and equal.  NEURO: Alert. Speech fluent. Sensation to light touch intact. MSK:  Muscle tone and bulk are normal bilaterally. Strength 4+/5 in bilateral upper limbs. Strength 4+/5 with bilateral ankle dorsiflexion and plantarflexion. Able to lift the bilateral lower limbs off of chair against gravity but has more difficulty on the left. LIMBS: No edema in bilateral lower limbs. SKIN: Warm and dry with good turgor. PSYCH: Mood WNL. Affect WNL. Appropriately interactive. Diagnostics:     CBC: Recent Labs     01/02/22  0606 01/03/22  0508 01/04/22  1015   WBC 10.1 9.0 9.9   RBC 3.01* 2.86* 2.96*   HGB 9.7* 9.2* 9.7*   HCT 30.5* 28.9* 30.0*   .3 101.0 101.4   RDW 12.9 12.9 12.5    277 283     BMP:   Recent Labs     01/02/22  0606 01/03/22  0508    136   K 3.8 3.8    98   CO2 25 24   BUN 13 13   CREATININE 0.62 0.69     BNP: No results for input(s): BNP in the last 72 hours. PT/INR: No results for input(s): PROTIME, INR in the last 72 hours. APTT: No results for input(s): APTT in the last 72 hours. CARDIAC ENZYMES: No results for input(s): CKMB, CKMBINDEX, TROPONINT in the last 72 hours. Invalid input(s): CKTOTAL;3  FASTING LIPID PANEL:  Lab Results   Component Value Date    CHOL 198 07/23/2020     (A) 07/23/2020    TRIG 96 07/23/2020     LIVER PROFILE: No results for input(s): AST, ALT, ALB, BILIDIR, BILITOT, ALKPHOS in the last 72 hours. Impression:    1. Cauda equina syndrome s/p L2-S1 laminectomy on 12/30  2. Anemia  3. HTN  4. Hypothyroidism  5. GERD  6. History of breast cancer  7. Anxiety    Recommendations:    1.  Diagnosis:

## 2022-01-04 NOTE — PROGRESS NOTES
Physical Therapy  Facility/Department: [address] ORTHO/MED SURG  Daily Treatment Note  NAME: Mohini Cuello  : 1960  MRN: [id number]    Date of Service: 2022    Discharge Recommendations:  Further therapy recommended at discharge and the patient should be able to tolerate at least 3 hours per day over 5 days or 15 hours over 7 days. PT Equipment Recommendations  Equipment Needed: No    Assessment   Body structures, Functions, Activity limitations: Decreased functional mobility ; Decreased balance;Decreased endurance;Decreased strength; Increased pain;Decreased sensation  Assessment: Pt required Miryam for bed mobility for BLE progression and trunk stability. Pt ambulated 60ft with CGA using a RW and required increased time and effort to complete due to slow christine. Pt is limited by decreased BLE strength and would benefit from continued PT following discharge to address functional deficits. Prognosis: Good  PT Education: Goals; General Safety;Gait Training;Functional Mobility Training;Precautions;Transfer Training  Patient Education: Pt was educated on safety with RW and importance of OOB  REQUIRES PT FOLLOW UP: Yes  Activity Tolerance  Activity Tolerance: Patient limited by endurance; Patient Tolerated treatment well     Patient Diagnosis(es): The encounter diagnosis was Cauda equina compression ([address] has a past medical history of Anxiety, Arthritis, At maximum risk for fall, Cancer ([address] Cauda equina syndrome (HCC), Cervical stenosis of spine, GERD (gastroesophageal reflux disease), History of stomach ulcers, Hypertension, Hypothyroidism, Lumbar neuritis, Post laminectomy syndrome, Spinal deformity, and Thyroid disease. has a past surgical history that includes back surgery; hernia repair (Bilateral); Breast surgery (Right); cervical laminectomy (2014);  Mastectomy, radical; Hysterectomy, total abdominal; and Lumbar spine surgery (N/A, 12/30/2021). Restrictions  Restrictions/Precautions  Restrictions/Precautions: General Precautions,Surgical Protocols,Fall Risk,Up as Tolerated  Required Braces or Orthoses?: No  Position Activity Restriction  Other position/activity restrictions: Activity as tolerated, parish drain, mario cath  Subjective   General  Response To Previous Treatment: Patient with no complaints from previous session. Family / Caregiver Present: No  Subjective  Subjective: Pt and RN agreeable to PT. Pt alert in bed upon arrival, very pleasant and cooperative t/o treatment. C/o being incontinent of stool this date, requested to use the restroom prior to therapy. General Comment  Comments: Pt left in bed with call light within reach  Pain Screening  Patient Currently in Pain: Yes  Pain Assessment  Pain Assessment: 0-10  Pain Level: 2  Pain Type: Surgical pain  Pain Location: Back  Pain Orientation: Mid;Lower  Pain Descriptors: Discomfort  Pain Frequency: Intermittent  Pain Onset: On-going  Clinical Progression: Gradually improving  Functional Pain Assessment: Prevents or interferes some active activities and ADLs  Non-Pharmaceutical Pain Intervention(s): Ambulation/Increased Activity; Therapeutic presence  Vital Signs  Patient Currently in Pain: Yes       Orientation  Orientation  Overall Orientation Status: Within Functional Limits  Cognition      Objective   Bed mobility  Supine to Sit: Minimal assistance (for trunk progression)  Sit to Supine: Minimal assistance (for B LE progression)  Scooting: Minimal assistance  Comment: pt demo'd good understanding of log rolling technique  Transfers  Sit to Stand: Minimal Assistance  Stand to sit: Contact guard assistance  Comment: Verbal cues for hand placement with good return.   Ambulation  Ambulation?: Yes  Ambulation 1  Surface: level tile  Device: Rolling Walker  Assistance: Contact guard assistance  Quality of Gait: mildly unsteady with turning, no significant LOB  Gait Deviations: Slow Sharon; Shuffles  Distance: 20ft x1, 40ft x1  Stairs/Curb  Stairs?: No     Balance  Posture: Fair  Sitting - Static: Good;-  Sitting - Dynamic: Good;-  Standing - Static: Fair;+  Standing - Dynamic: Fair  Comments: standing balance assessed while using a RW  Exercises  Supine Exercises: Ankle Pumps, Gluteal sets, Hamstring Sets, Hip ABD/ADD, Hip IR/ER, Quad Sets, SAQ. Reps: 10x bilat  Core Strengthening: Pt sat EOB and on toilet with SBA, no LOB noted. Comments: good effort with PT    AM-PAC Score  AM-PAC Inpatient Mobility Raw Score : 16 (01/04/22 1512)  AM-PAC Inpatient T-Scale Score : 40.78 (01/04/22 1512)  Mobility Inpatient CMS 0-100% Score: 54.16 (01/04/22 1512)  Mobility Inpatient CMS G-Code Modifier : CK (01/04/22 1512)    Goals  Short term goals  Time Frame for Short term goals: 14 visits  Short term goal 1: Pt will ambulate 200 feet with a RW and SBA to increase functional independence. Short term goal 2: Pt will tolerate a 35 minute therapy session to promote increased endurance. Short term goal 3: Pt will demonstrate good- standing balance to decrease fall risk. Short term goal 4: Pt will perform sit<>stand transfer with supervision to increase functional independence. Short term goal 5: Pt will negotiate 1 stair with no handrail and SBA to allow the pt to enter prior living arrangements.     Plan    Plan  Times per week: 5-6x  Times per day: Daily  Current Treatment Recommendations: Strengthening,ROM,Stair training,Balance Training,Gait Training,Patient/Caregiver Education & Training,Equipment Evaluation, Education, & procurement,Neuromuscular Re-education,Functional Mobility Training,Endurance Training,Transfer Training,Safety Education & Training,Home Exercise Program  Safety Devices  Type of devices: Call light within reach,Gait belt,Patient at risk for falls,Nurse notified,Left in bed  Restraints  Initially in place: No     Therapy Time   Individual Concurrent Group Co-treatment   Time In 1410 Time Out 1435         Minutes 25         Timed Code Treatment Minutes: [address], [place]

## 2022-01-04 NOTE — DISCHARGE INSTR - COC
Continuity of Care Form    Patient Name: Tona Hardin   :  1960  MRN:  [id number]    [facility] date:  2021  Discharge date:  ***    Code Status Order: Full Code   Advance Directives:   Advance Care Flowsheet Documentation       Date/Time Healthcare Directive Type of Healthcare Directive Copy in [address] Agent's Name Healthcare Agent's Phone Number    21 1001 Yes, patient has an advance directive for healthcare treatment Durable power of  for health care No, copy requested from family -- -- --            Admitting Physician:  Anil Conner DO  PCP: Sabina Wade MD    Discharging Nurse: [facility] Unit/Room#: 0911/8601-97  Discharging Unit Phone Number: ***    Emergency Contact:   Extended Emergency Contact Information  Primary Emergency Contact: [facility] Phone: [phone]  Relation: Child  Secondary Emergency Contact: [facility] Phone: [phone]  Relation: Spouse  Preferred language: English    Past Surgical History:  Past Surgical History:   Procedure Laterality Date    BACK SURGERY      x 2    BREAST SURGERY Right     mastectomy with reconstruction    CERVICAL LAMINECTOMY  2014    cervicle laminectomy c4-c6    HERNIA REPAIR Bilateral     inguinal    HYSTERECTOMY, TOTAL ABDOMINAL      LUMBAR SPINE SURGERY N/A 2021    LUMBAR LAMINECTOMY L2-S1 performed by Anil Conner DO at [address], RADICAL         Immunization History:   Immunization History   Administered Date(s) Administered    COVID-19, Bhavik Lucho, Primary or Immunocompromised, PF, 100mcg/0.5mL 2021, 2021, 12/15/2021    Influenza, MDCK Quadv, IM, PF (Flucelvax 2 yrs and older) 10/22/2021    Influenza, Quadv, IM, PF (6 mo and older Fluzone, Flulaval, Fluarix, and 3 yrs and older Afluria) 10/02/2018, 2019, 2020    Pneumococcal Polysaccharide ([id number]) 10/28/2020    Tdap (Boostrix, Adacel) 2019 Active Problems:  Patient Active Problem List   Diagnosis Code    Cervical stenosis of spine M48.02    Essential hypertension I10    Hypothyroidism E03.9    Lumbar radiculopathy, chronic M54.16    Lumbar neuritis M54.16    Post laminectomy syndrome M96.1    Hypokalemia E87.6    Pain of right hip joint M25.551    Post-menopausal osteoporosis M81.0    Chronic gastritis without bleeding K29.50    Elevated CEA R97.0    Esophageal dysphagia R13.19    Hypotension due to drugs I95.2    Ulcerative esophagitis K22.10    Weight loss, abnormal R63.4    Cervical myelopathy (HCC) G95.9    Lumbar stenosis with neurogenic claudication M48.062    Spinal deformity Q76.49    Anxiety about health F41.8    Cauda equina compression (HCC) G83.4    Anemia, normocytic normochromic D64.9    Iron deficiency E61.1    Pseudomonas urinary tract infection N39.0, B96.5    Hypocalcemia E83.51       Isolation/Infection:   Isolation            No Isolation          Patient Infection Status       None to display            Nurse Assessment:  Last Vital Signs: /70   Pulse 97   Temp 98.1 °F (36.7 °C) (Oral)   Resp 16   SpO2 96%     Last documented pain score (0-10 scale): Pain Level: 2  Last Weight:   Wt Readings from Last 1 Encounters:   12/29/21 140 lb (63.5 kg)     Mental Status:  {IP PT MENTAL STATUS:20030}    IV Access:  { MONICA IV ACCESS:261484263}    Nursing Mobility/ADLs:  Walking   {CHP DME ECJK:700396786}  Transfer  {CHP DME EBZM:077249743}  Bathing  {CHP DME EFTE:813596485}  Dressing  {CHP DME PDBE:545136467}  Toileting  {CHP DME ZCAX:822104578}  Feeding  {CHP DME DYOY:491389690}  Med Admin  {CHP DME FNZJ:478568335}  Med Delivery   { MONICA MED Delivery:037286572}    Wound Care Documentation and Therapy:        Elimination:  Continence:    Bowel: {YES / WQ:15878}  Bladder: {YES / ME:54992}  Urinary Catheter: {Urinary Catheter:966844670}   Colostomy/Ileostomy/Ileal Conduit: {YES / UD:94377}       Date of Last BM: ***    Intake/Output Summary (Last 24 hours) at 2022 1213  Last data filed at 2022 0600  Gross per 24 hour   Intake --   Output 900 ml   Net -900 ml     I/O last 3 completed shifts:  In: -   Out: 900 [Urine:900]    Safety Concerns:     [address] MONICA Safety Concerns:631256337}    Impairments/Disabilities:      [address] [facility] Impairments/Disabilities:750850474}    Nutrition Therapy:  Current Nutrition Therapy:   [facility] Diet List:133305515}    Routes of Feeding: {CHP DME Other Feedings:796288275}  Liquids: {Slp liquid thickness:44519}  Daily Fluid Restriction: {CHP DME Yes amt example:558051705}  Last Modified Barium Swallow with Video (Video Swallowing Test): {Done Not Done AXLS:930709620}    Treatments at the Time of Hospital Discharge:   Respiratory Treatments: ***  Oxygen Therapy:  {Therapy; copd oxygen:91453}  Ventilator:    { CC Vent OVLR:969267761}    Rehab Therapies: {THERAPEUTIC INTERVENTION:6497179077}  Weight Bearing Status/Restrictions: [address] [facility] Weight Bearin}  Other Medical Equipment (for information only, NOT a DME order):  {EQUIPMENT:343605104}  Other Treatments: ***    Patient's personal belongings (please select all that are sent with patient):  {CHP DME Belongings:209639181}    RN SIGNATURE:  {Esignature:539302004}    CASE MANAGEMENT/SOCIAL WORK SECTION    Inpatient Status Date: ***    Readmission Risk Assessment Score:  Readmission Risk              Risk of Unplanned Readmission:  13           Discharging to Facility/ Agency   Name:   [address] [place] [address] [address], [address] [facility] [address]       Phone: [phone]       Fax: [phone]          Dialysis Facility (if applicable)   Name:  Address:  Dialysis Schedule:  Phone:  Fax:    / signature: Electronically signed by Iliana Pereira RN on 22 at 12:14 PM EST    PHYSICIAN SECTION    Prognosis: {Prognosis:4968984522}    Condition at Discharge: [address] Patient Condition:531266133}    Rehab Potential (if transferring to Rehab): {Prognosis:5068299915}    Recommended Labs or Other Treatments After Discharge: ***    Physician Certification: I certify the above information and transfer of Veronika Antoine  is necessary for the continuing treatment of the diagnosis listed and that she requires {Admit to Appropriate Level of Care:56027} for {GREATER/LESS:316133633} 30 days.      Update Admission H&P: {CHP DME Changes in WDZXB:046602158}    PHYSICIAN SIGNATURE:  {Esignature:929935183}

## 2022-01-04 NOTE — PROGRESS NOTES
Neurosurgery AILEEN/Resident    Daily Progress Note   Cauda equina, POD #5 lumbar laminectomy L2-S1.    1/4/2022  8:04 AM    Chart reviewed. No acute events overnight. No new complaints. Post-op pain controlled. Tolerating PO. Sitting up in chair for breakfast.  Has been ambulating around her room independently. No nausea or vomiting. Passing gas, having bowel movements. Bruce has been discontinued, has been able to independently void. PMR recommended acute rehab, however patient adamant that she would like to go home.       Vitals:    01/03/22 1227 01/03/22 1624 01/03/22 1938 01/04/22 0713   BP: 107/61 117/60 121/65 133/70   Pulse: 97 90 94 97   Resp: 14 19 18 16   Temp: 98.8 °F (37.1 °C) 98.3 °F (36.8 °C) 98.4 °F (36.9 °C) 98.1 °F (36.7 °C)   TempSrc: Oral Oral Oral Oral   SpO2: 94% 94% 94% 96%         PE:   AOx3   CNII-XII intact   PERRL, EOMI     Motor   L deltoid 5/5; R deltoid 5/5  L biceps 5/5; R biceps 5/5  L triceps 5/5; R triceps 5/5  L wrist extension 5/5; R wrist extension 5/5  L intrinsics 5/5; R intrinsics 5/5      L iliopsoas 4/5 , R iliopsoas 5/5  L quadriceps 4+/5; R quadriceps 5/5  L Dorsiflexion 4+/5; R dorsiflexion 4+/5  L Plantarflexion 4+/5; R plantarflexion 4+/5  L EHL 4/5; R EHL 4/5    Sensation: decreased BLE    Drain output: Discontinued yesterday  Incision: CDI      Lab Results   Component Value Date    WBC 9.0 01/03/2022    HGB 9.2 (L) 01/03/2022    HCT 28.9 (L) 01/03/2022     01/03/2022    CHOL 198 07/23/2020    TRIG 96 07/23/2020     (A) 07/23/2020     01/03/2022    K 3.8 01/03/2022    CL 98 01/03/2022    CREATININE 0.69 01/03/2022    BUN 13 01/03/2022    CO2 24 01/03/2022    GLUF 109 07/23/2020       Radiology   XR ABDOMEN (KUB) (SINGLE AP VIEW)    Result Date: 1/2/2022  EXAMINATION: ONE SUPINE XRAY VIEW(S) OF THE ABDOMEN 1/2/2022 8:43 am COMPARISON: None HISTORY: ORDERING SYSTEM PROVIDED HISTORY: constipation post opp TECHNOLOGIST PROVIDED HISTORY: constipation post opp Reason for Exam: port supine FINDINGS: There is gaseous distension of small and large bowel which could be related to an ileus versus bowel obstruction. There is a catheter noted in the region of the bladder. There is a drainage catheter along the midline of the lower spine at L3-L5. Asymmetric degenerative changes are noted in the right hip. No renal calculi. 1. Gaseous distension of small and large bowel which could be related to an ileus versus bowel obstruction. FLUORO FOR SURGICAL PROCEDURES    Result Date: 12/30/2021  Radiology exam is complete. No Radiologist dictation. Please follow up with ordering provider. A/P  62 y.o. female who presents with cauda equina, POD #5 lumbar laminectomy L2-S1.     - Baclofen 10 mg TID  - Urecholine 25 mg 4 times daily  - Roxicodone 5-10mg PRN  - Bruce catheter removed yesterday, IV Zosyn transitioned to oral Cipro  - Home dose methadone  - Discharge planning: Home versus rehab today?  - DVT prophylaxis: lovenox, SCDs  - Diet as tolerated  - Encourage incentive spirometry  - Neuro checks per floor protocol  - Continue working with PT/OT      Please contact neurosurgery with any changes in patients neurologic status.        CASSANDRA Mcgill-CNP  1/4/22  8:01 AM

## 2022-01-04 NOTE — DISCHARGE SUMMARY
Department of Neurosurgery                                            Discharge Summary       PATIENT NAME: Javier Purdy  YOB: 1960  MEDICAL RECORD NO. [id number]  DATE: 1/4/2022  PRIMARY CARE PHYSICIAN: Kimberly Matt MD  DISCHARGE DATE:  No discharge date for patient encounter. DISCHARGE DIAGNOSIS:   Patient Active Problem List   Diagnosis Code    Cervical stenosis of spine M48.02    Essential hypertension I10    Hypothyroidism E03.9    Lumbar radiculopathy, chronic M54.16    Lumbar neuritis M54.16    Post laminectomy syndrome M96.1    Hypokalemia E87.6    Pain of right hip joint M25.551    Post-menopausal osteoporosis M81.0    Chronic gastritis without bleeding K29.50    Elevated CEA R97.0    Esophageal dysphagia R13.19    Hypotension due to drugs I95.2    Ulcerative esophagitis K22.10    Weight loss, abnormal R63.4    Cervical myelopathy (HCC) G95.9    Lumbar stenosis with neurogenic claudication M48.062    Spinal deformity Q76.49    Anxiety about health F41.8    Cauda equina compression (HCC) G83.4    Anemia, normocytic normochromic D64.9    Iron deficiency E61.1    Pseudomonas urinary tract infection N39.0, B96.5    Hypocalcemia E83.51     DISPOSITION: Acute Rehab    PROCEDURES:      Laminectomy to decompress the thecal sac at L2, L3, L4, L5, S1  HOSPITAL COURSE     Javier Purdy originally presented to the hospital on 12/30/2021  8:12 AM with spinal deformity, cauda equina syndrome. She was admitted and taken to the OR for neurosurgery for procedure listed above. Patient's drain was removed when found to have low output without complications. Patient tolerated all procedures well. Labs and imaging were followed daily. At time of discharge, Javier Purdy was tolerating a ADULT DIET;  Regular, having bowel movements (she did have difficulty with constipation and KUB was completed 1/2 showing gaseous distention of small and large bowel which could be related to an ileus vs bowel obstruction) , ambulating on her own accord and had adequate analgesia on oral pain medications, and had no signs of symptoms of complications. She is medically stable to be discharged. PHYSICAL EXAMINATION        Discharge Vitals:  oral temperature is 98.1 °F (36.7 °C). Her blood pressure is 133/70 and her pulse is 97. Her respiration is 16 and oxygen saturation is 96%. AOx3   CNII-XII intact   PERRL, EOMI      Motor   L deltoid 5/5; R deltoid 5/5  L biceps 5/5; R biceps 5/5  L triceps 5/5; R triceps 5/5  L wrist extension 5/5; R wrist extension 5/5  L intrinsics 5/5; R intrinsics 5/5      L iliopsoas 4/5 , R iliopsoas 5/5  L quadriceps 4+/5; R quadriceps 5/5  L Dorsiflexion 4+/5; R dorsiflexion 4+/5  L Plantarflexion 4+/5; R plantarflexion 4+/5  L EHL 4/5; R EHL 4/5     Sensation: decreased BLE     Incision: CDI      LABS     Recent Labs     01/02/22  0606 01/03/22  0508 01/04/22  1015   WBC 10.1 9.0 9.9   HGB 9.7* 9.2* 9.7*   HCT 30.5* 28.9* 30.0*    277 283    136  --    K 3.8 3.8  --     98  --    CO2 25 24  --    BUN 13 13  --    CREATININE 0.62 0.69  --        DISCHARGE INSTRUCTIONS     Discharge Medications:        Medication List      ASK your doctor about these medications    busPIRone 30 MG tablet  Commonly known as: BUSPAR  Take 30 mg by mouth 2 times daily     dilTIAZem 180 MG extended release capsule  Commonly known as: DILACOR XR  Take 1 capsule by mouth daily     HYDROcodone-acetaminophen 7.5-325 MG per tablet  Commonly known as: NORCO     levothyroxine 88 MCG tablet  Commonly known as: SYNTHROID  Take 1 tablet by mouth Daily     methadone 10 MG tablet  Commonly known as: DOLOPHINE     pantoprazole 40 MG tablet  Commonly known as: PROTONIX  TAKE ONE TABLET BY MOUTH DAILY     Potassium Gluconate 595 (99 K) MG Tabs  TAKE ONE TABLET BY MOUTH DAILY  Ask about: Which instructions should I use?      tiZANidine 4 MG tablet  Commonly known as: ZANAFLEX     Vitamin D (Ergocalciferol) 50 MCG (2000 UT) Caps  TAKE ONE CAPSULE BY MOUTH DAILY           Where to Get Your Medications      These medications were sent to [facility],  [address]  [facility], [address]    Phone: [phone]   · Potassium Gluconate 595 (99 K) MG Tabs       Diet: ADULT DIET;  Regular diet as tolerated  Activity: Provided in AVS  Wound Care: Daily and as needed  Follow-up:  in the [facility] clinic as shown in AVS   Time Spent for discharge: 30 minutes    CASSANDRA Cook NP  1/4/2022, 12:01 PM

## 2022-01-04 NOTE — PROGRESS NOTES
Message sent to Dr. Yessi Chung for advice.  Do you want them to go to [facility] or order lab work or see them in the office?   Report given to Elton Eisenberg at [facility]

## 2022-01-04 NOTE — CARE COORDINATION
Transition planning  Patient is agreeable to go to [address] [place] [address]. Received message from Machelle at [address] [place] [address], she states they are able to accept patient today. Report # [id number] with Lia Ferguson at [facility],transportation set for 5:00 pm. Updated patient and Justyna per VM of 5:00 pm p/u time.       Discharge [facility] Case Management Department  Written by: Liv Capone RN    Patient Name: [place] Sportsman  Attending Provider: Ronni Samuel DO  Admit Date: 2021  8:12 AM  MRN: [id number]  Account: [de-identified]                     : 1960  Discharge Date: 2022      Disposition: Mary Borrego RN

## 2022-01-04 NOTE — CARE COORDINATION
Transitional Planning:   Left message for Justyna at [address] [place] [address] acute rehab to follow up regarding referral sent 1/1

## 2022-01-04 NOTE — PROGRESS NOTES
[facility]: JACKSON CASIANO W  Acute Inpatient Rehab Preadmission Assessment    Patient Name: Trang Maharaj        MRN: [id number]    : 1960  (62 y.o.)  Gender: female     Admitted from:   [facility]  [x][facility]   [facility]   [facility]   []Outside Admission - Location:                                 [x]Initial         []Updated    Date of Onset / Admission to the acute hospital:  21    Inpatient Rehabilitation Admitting Diagnosis:  Cauda Equina Syndrome    Did patient have surgery/procedures? []No  [x]Yes:      Procedure:  Laminectomy to decompress the thecal sac at L2, L3, L4, L5, S1  Use of operative microscope  Use of fluoroscopy  22 modifier    Physicians: Bert Sepulveda    Risk for clinical complications: Moderate    Co-morbidities:      1. Spasticity  2. Hypertension  3. Hypothyroidism  4. History of lumbar spine surgeries and cervical spine surgery post laminectomy syndrome/chronic pain  5. History of radical mastectomy  6.  Depression      Financial Information  Primary insurance:  [x]Medicare [] Medicare HMO  []Commercial insurance    []Medicaid   [] Medicaid HMO []Workers Compensation   []Personal Pay    Secondary Insurance:  []Medicare [] Medicare HMO  []Commercial insurance    []Medicaid  []Medicaid HMO  []Workers Compensation [x]None    Precautions:   []Cardiac Precautions:    []Total hip precautions:    []Weight Bearing status:  [x]Safety Precautions/Concerns:  Fall Risk, General Precautions  [x]Visually impaired:  Wears glasses for reading   []Hard of Hearing:     Isolation Precautions:         []Yes  [x]No  If Yes:  [] Droplet  []Contact []Airborne     []VRE     []MRSA     []C-diff         [] TB       [] ESBL [] MDRO          [] Other:        Physiatrist:  []   Dr. Rosa Reynolds     []   Dr. Tamela Thomas  [x]   Dr. Florez  []   Dr. Myranda Viveros    Patients Occupation: Disabled    Reviewed Lab and Diagnostic reports from Current Admission: Yes    Patients Prior Functional  Level: Prior Function  ADL Assistance: Independent (pt reported occasionally requiring assistance for donning shirt. pt reported signficantother assists with getting in/out shower)  Homemaking Assistance: Needs assistance  Ambulation Assistance: Independent  Transfer Assistance: Independent  Additional Comments: pt reported significant other works but able to assist PRN when home    History of current illness, per PM&R Consult:    52-year-old female with lower extremity weakness, history of hypertension and hypothyroidism as well as reflux. Admitted for lumbar laminectomy-has had history of lumbar spine surgery in past as well cervical spine surgery. He has lower extremity weakness. Difficulty with bowels and bladder. Underwent lumbar laminectomy L2-S1 on 12/30.     Internal medicine -cauda equina syndrome with progressive lower extremity weakness ultimately wheelchair-bound with severe lumbar stenosis, underwent laminectomy L2-S1, restart antihypertensives and Protonix and Synthroid checking iron studies for anemia     Neurosurgery-baclofen for spasticity has Bruce catheter, Roxicodone for pain resuming home methadone    Prognosis: Fair    Current functional status for upper extremity ADLs:  UE Bathing: Stand by assistance  UE Dressing: Stand by assistance    Current functional status for lower extremity ADLs:  LE Bathing: Moderate assistance  LE Dressing: Moderate assistance    Current functional status for bed, chair, wheelchair transfers:  Transfers  Sit to Stand: Minimal Assistance  Stand to sit: Contact guard assistance  Comment: Verbal cues for hand placement with good return. Current functional status for toilet transfers:   Toilet Transfers  Toilet - Technique: Ambulating  Equipment Used: Standard bedside commode  Toilet Transfer: Minimal assistance  Toilet Transfers Comments: Unsteady, no gross LOB w/RW    Current functional status for locomotion:  Ambulation  Ambulation?: Yes  More Ambulation?: No  Ambulation 1  Surface: level tile  Device: Rolling Walker  Assistance: Contact guard assistance  Quality of Gait: mildly unsteady with turning, no significant LOB  Gait Deviations: Slow Sharon,Shuffles  Distance: 20ft x1, 40ft x1    Current functional status for comprehension: Complete independence    Current functional status for expression: Complete independence    Current functional status for social interaction: Complete independence    Current functional status for problem solving: Complete independence    Current functional status for memory: Complete independence    Current Deficits R/T Impairment: Impaired Functional Mobility and Decreased ADLs    Required Therapy:   [x] Physical Therapy  [x] Occupational Therapy   [] Speech Therapy, as appropriate    Additional Services:    [x]     [] Recreational Therapy, as appropriate    [x] Nutrition    [] Dialysis  [] Cultural Needs Identified  [] Special Equipment Needs  [] Other    Rehab Justification:  Needs 3 hrs therapy per day or 15 hours per week:  Yes  Identified Rehab Nursing needs: Yes  Intense Interdisciplinary need:  Yes  Need for 24 hr physician supervision:  Yes  Measurable improved quality of life:  Yes  Willingness to participate:  Yes  Medical Necessity:  Yes  Patient able to tolerate care proposed:  Yes    Expected Discharge Destination/Functional Level:  Home with assist  Expected length of time to achieve that level of improvement: 1-2 weeks  Expected Post Discharge Treatments: Home with possible Home Care    Other information relevant to patient's care needs:  N/A    Acute Inpatient Rehabilitation Disclosure Statement will be provided to patient upon admission to ARU with patient's verbalization of understanding. I have reviewed and concur with the findings and results of the pre-admission screening assessment completed by the Inpatient Rehabilitation Admissions Coordinator.

## 2022-01-04 NOTE — PROGRESS NOTES
[facility]  Office: [address], DO, Debiga Certain, DO, Tunde Kaba, DO, Abilio Tomlin Blood, DO, Kaleb Julian MD, Angela Reed MD, Yvonne Villavicencio MD, Obinna Maya MD, Nicholas Mckee MD, Diego Castañeda MD, Yovanny Huerta MD, Rui Bhatia, DO, Grace Hernándze, DO, Cecelia Estevez MD,  David Northern, DO, Alfred Agustin MD, Kait Levy MD, Michael Narvaez MD, Iveth Hickman MD, Brittny Ayala MD, Bailey Fagan MD, Melany Enrique MD, Adin Olsen, [facility], [facility] Kevinoss, CNP, Radha Rosasi, CNP, Asa Biggs, [facility], Neri Zepeda, Leif Vela, CNP, Beverly Chappell, CNP, Lisa Palma, CNP, Melony Faye, CNP, SARTHAK PopC, Adriel Lagunas, [facility], Eden Ryder, [facility], Claudia Portillo, CNP, Hoang Sethi, CNP, John Owen, CNP, Michelle Ohara, CNP, Radha Martin, CNP, Jolly Dakin, [facility]    Progress Note    1/4/2022    7:57 AM    Name:   Lata Gramajo  MRN:     [id number]     Acct:      [de-identified]   Room:   [address] [place] [address] Day:  5  Admit Date:  12/30/2021  8:12 AM    PCP:   Makenna Flores MD  Code Status:  Full Code    Subjective:     C/C: weakness  Interval History Status: not changed. Pt seen and examined. Bruce was removed yesterday, no retention noted. Has had BM today. No fevers, chills, nausea, vomiting or diarrhea noted. Review of Systems:     Constitutional:  negative for chills, fevers, sweats  Respiratory:  negative for cough, dyspnea on exertion, shortness of breath, wheezing  Cardiovascular:  negative for chest pain, chest pressure/discomfort, lower extremity edema, palpitations  Gastrointestinal:  negative for abdominal pain, constipation, diarrhea, nausea, vomiting  Neurological:  negative for dizziness, headache admits to lower ext weakness that's been there. Has noticed some small improvement. Medications: Allergies:     Allergies   Allergen Reactions    Latex Hives, Itching, Dermatitis and Rash    Kiwi Extract      Other reaction(s): Facial Swelling       Current Meds:   Scheduled Meds:    sodium chloride flush  5-40 mL IntraVENous 2 times per day    enoxaparin  40 mg SubCUTAneous Daily    ciprofloxacin  500 mg Oral 2 times per day    bethanechol  25 mg Oral Q6H    polyethylene glycol  17 g Oral BID    magnesium hydroxide  15 mL Oral Q6H    sennosides-docusate sodium  2 tablet Oral Daily    ferrous sulfate  325 mg Oral BID WC    methadone  10 mg Oral BID    busPIRone  30 mg Oral BID    dilTIAZem  180 mg Oral Daily    levothyroxine  88 mcg Oral Daily    pantoprazole  40 mg Oral Daily    baclofen  10 mg Oral TID    bupivacaine liposome  20 mL Infiltration Once     Continuous Infusions:    sodium chloride       PRN Meds: sodium chloride flush, sodium chloride, ondansetron **OR** ondansetron, bisacodyl, oxyCODONE, oxyCODONE    Data:     Past Medical History:   has a past medical history of Anxiety, Arthritis, At maximum risk for fall, Cancer (HCC), Cauda equina syndrome (HCC), Cervical stenosis of spine, GERD (gastroesophageal reflux disease), History of stomach ulcers, Hypertension, Hypothyroidism, Lumbar neuritis, Post laminectomy syndrome, Spinal deformity, and Thyroid disease. Social History:   reports that she quit smoking about 8 years ago. She has a 15.00 pack-year smoking history. She has never used smokeless tobacco. She reports current alcohol use. She reports that she does not use drugs. Family History:   Family History   Problem Relation Age of Onset    Hypertension Mother     Heart Disease Mother     Cancer Mother        Vitals:  /70   Pulse 97   Temp 98.1 °F (36.7 °C) (Oral)   Resp 16   SpO2 96%   Temp (24hrs), Av.4 °F (36.9 °C), Min:98.1 °F (36.7 °C), Max:98.8 °F (37.1 °C)    No results for input(s): POCGLU in the last 72 hours. I/O (24Hr):     Intake/Output Summary (Last 24 hours) at 2022 0759  Last data filed at 1/4/2022 0600  Gross per 24 hour   Intake --   Output 900 ml   Net -900 ml       Labs:  Hematology:  Recent Labs     01/02/22  0606 01/03/22  0508   WBC 10.1 9.0   RBC 3.01* 2.86*   HGB 9.7* 9.2*   HCT 30.5* 28.9*   .3 101.0   MCH 32.2 32.2   MCHC 31.8 31.8   RDW 12.9 12.9    277   MPV 9.6 9.5     Chemistry:  Recent Labs     01/02/22  0606 01/03/22  0508    136   K 3.8 3.8    98   CO2 25 24   GLUCOSE 102* 100*   BUN 13 13   CREATININE 0.62 0.69   ANIONGAP 13 14   LABGLOM >60 >60   GFRAA >60 >60   CALCIUM 8.3* 8.7   No results for input(s): PROT, LABALBU, LABA1C, I7PNSPO, R5HDTYB, FT4, TSH, AST, ALT, LDH, GGT, ALKPHOS, LABGGT, BILITOT, BILIDIR, AMMONIA, AMYLASE, LIPASE, LACTATE, CHOL, HDL, LDLCHOLESTEROL, CHOLHDLRATIO, TRIG, VLDL, TCS91UR, PHENYTOIN, PHENYF, URICACID, POCGLU in the last 72 hours. ABG:No results found for: POCPH, PHART, PH, POCPCO2, POK8RQR, PCO2, POCPO2, PO2ART, PO2, POCHCO3, XFV2DES, HCO3, NBEA, PBEA, BEART, BE, THGBART, THB, UDV2GHO, KQIE3XBH, E1OJSTTE, O2SAT, FIO2  Lab Results   Component Value Date/Time    SPECIAL NOT REPORTED 01/01/2022 10:20 AM     Lab Results   Component Value Date/Time    CULTURE PSEUDOMONAS AERUGINOSA >614705 CFU/ML (A) 01/01/2022 10:20 AM       Radiology:  XR ABDOMEN (KUB) (SINGLE AP VIEW)    Result Date: 1/2/2022  1. Gaseous distension of small and large bowel which could be related to an ileus versus bowel obstruction. Physical Examination:        General appearance:  alert, cooperative and no distress has decreased exercise capacity. Mental Status:  oriented to person, place and time and normal affect  Lungs:  clear to auscultation bilaterally, normal effort  Heart:  regular rate and rhythm, no murmur  Abdomen:  soft, nontender, nondistended, normal bowel sounds, no masses, hepatomegaly, splenomegaly  Extremities:  no edema, redness, tenderness in the calves has weakness.    Skin:  no gross lesions, rashes, induration    Assessment: Hospital Problems           Last Modified POA    * (Principal) Cauda equina compression ([address] 1/2/2022 Yes    Essential hypertension 12/31/2021 Yes    Hypothyroidism 12/31/2021 Yes    Anemia, normocytic normochromic 1/2/2022 Yes    Iron deficiency 1/1/2022 Yes    Pseudomonas urinary tract infection 1/2/2022 Yes    Hypocalcemia 1/2/2022 Yes          Plan:        1. Cauda Equina syndrome: S/p laminectomy at L2 L3-L4-L5 and S1 on 12/30/21. Drain removed 1/3/21. Waiting on Acute rehab for placement. 2. Constipation: continue bowel regimen. Did have a large BM 1/4  3. UTI: continue Ciprofloxacin until 1/13  4. Urinary retention-resolved: 2/2 constipation, opiate use. Bruce removed without retention noted on PVR. 5. Hypertension: Restart home antihypertensive regimen  6. GERD: Continue Protonix  7. Hypothyroidism: continue Synthroid  8. Anemia: 2/2 blood loss and EFREN. Started ferrous sulfate. Transfuse prn for  symptomatic anemia hemoglobin less than 7  9. PT OT    Dispo: waiting placement- okay for d/c medical perspective.    Merline Alexis DO  1/4/2022  7:57 AM

## 2022-01-05 ENCOUNTER — APPOINTMENT (OUTPATIENT)
Dept: GENERAL RADIOLOGY | Age: 62
DRG: 560 | End: 2022-01-05
Attending: PHYSICAL MEDICINE & REHABILITATION
Payer: MEDICARE

## 2022-01-05 LAB
ANION GAP SERPL CALCULATED.3IONS-SCNC: 11 MMOL/L (ref 9–17)
BUN BLDV-MCNC: 9 MG/DL (ref 8–23)
BUN/CREAT BLD: ABNORMAL (ref 9–20)
CALCIUM SERPL-MCNC: 8.5 MG/DL (ref 8.6–10.4)
CHLORIDE BLD-SCNC: 97 MMOL/L (ref 98–107)
CO2: 27 MMOL/L (ref 20–31)
CREAT SERPL-MCNC: 0.63 MG/DL (ref 0.5–0.9)
GFR AFRICAN AMERICAN: >60 ML/MIN
GFR NON-AFRICAN AMERICAN: >60 ML/MIN
GFR SERPL CREATININE-BSD FRML MDRD: ABNORMAL ML/MIN/{1.73_M2}
GFR SERPL CREATININE-BSD FRML MDRD: ABNORMAL ML/MIN/{1.73_M2}
GLUCOSE BLD-MCNC: 94 MG/DL (ref 70–99)
HCT VFR BLD CALC: 28.5 % (ref 36–46)
HEMOGLOBIN: 9.7 G/DL (ref 12–16)
MCH RBC QN AUTO: 33 PG (ref 26–34)
MCHC RBC AUTO-ENTMCNC: 34.1 G/DL (ref 31–37)
MCV RBC AUTO: 96.6 FL (ref 80–100)
NRBC AUTOMATED: ABNORMAL PER 100 WBC
PDW BLD-RTO: 13.3 % (ref 11.5–14.9)
PLATELET # BLD: 306 K/UL (ref 150–450)
PMV BLD AUTO: 7.4 FL (ref 6–12)
POTASSIUM SERPL-SCNC: 3.9 MMOL/L (ref 3.7–5.3)
RBC # BLD: 2.95 M/UL (ref 4–5.2)
SODIUM BLD-SCNC: 135 MMOL/L (ref 135–144)
WBC # BLD: 6 K/UL (ref 3.5–11)

## 2022-01-05 PROCEDURE — 85027 COMPLETE CBC AUTOMATED: CPT

## 2022-01-05 PROCEDURE — 97166 OT EVAL MOD COMPLEX 45 MIN: CPT

## 2022-01-05 PROCEDURE — 6370000000 HC RX 637 (ALT 250 FOR IP): Performed by: PHYSICAL MEDICINE & REHABILITATION

## 2022-01-05 PROCEDURE — 6370000000 HC RX 637 (ALT 250 FOR IP): Performed by: NURSE PRACTITIONER

## 2022-01-05 PROCEDURE — 1180000000 HC REHAB R&B

## 2022-01-05 PROCEDURE — 80048 BASIC METABOLIC PNL TOTAL CA: CPT

## 2022-01-05 PROCEDURE — 97110 THERAPEUTIC EXERCISES: CPT

## 2022-01-05 PROCEDURE — 97530 THERAPEUTIC ACTIVITIES: CPT

## 2022-01-05 PROCEDURE — 97535 SELF CARE MNGMENT TRAINING: CPT

## 2022-01-05 PROCEDURE — 99223 1ST HOSP IP/OBS HIGH 75: CPT | Performed by: PHYSICAL MEDICINE & REHABILITATION

## 2022-01-05 PROCEDURE — 97162 PT EVAL MOD COMPLEX 30 MIN: CPT

## 2022-01-05 PROCEDURE — 36415 COLL VENOUS BLD VENIPUNCTURE: CPT

## 2022-01-05 PROCEDURE — 74018 RADEX ABDOMEN 1 VIEW: CPT

## 2022-01-05 PROCEDURE — 51798 US URINE CAPACITY MEASURE: CPT

## 2022-01-05 PROCEDURE — 6360000002 HC RX W HCPCS: Performed by: NURSE PRACTITIONER

## 2022-01-05 PROCEDURE — 97116 GAIT TRAINING THERAPY: CPT

## 2022-01-05 PROCEDURE — 99233 SBSQ HOSP IP/OBS HIGH 50: CPT | Performed by: FAMILY MEDICINE

## 2022-01-05 RX ORDER — MAGNESIUM CARB/ALUMINUM HYDROX 105-160MG
30 TABLET,CHEWABLE ORAL ONCE
Status: COMPLETED | OUTPATIENT
Start: 2022-01-05 | End: 2022-01-05

## 2022-01-05 RX ORDER — BISACODYL 10 MG
10 SUPPOSITORY, RECTAL RECTAL DAILY
Status: DISCONTINUED | OUTPATIENT
Start: 2022-01-05 | End: 2022-01-14 | Stop reason: HOSPADM

## 2022-01-05 RX ADMIN — MINERAL OIL 30 ML: 1000 SOLUTION ORAL at 15:25

## 2022-01-05 RX ADMIN — ENOXAPARIN SODIUM 40 MG: 100 INJECTION SUBCUTANEOUS at 08:02

## 2022-01-05 RX ADMIN — LEVOTHYROXINE SODIUM 88 MCG: 0.09 TABLET ORAL at 06:37

## 2022-01-05 RX ADMIN — MAGNESIUM HYDROXIDE 30 ML: 400 SUSPENSION ORAL at 15:25

## 2022-01-05 RX ADMIN — DOCUSATE SODIUM 50MG AND SENNOSIDES 8.6MG 2 TABLET: 8.6; 5 TABLET, FILM COATED ORAL at 08:01

## 2022-01-05 RX ADMIN — BETHANECHOL CHLORIDE 25 MG: 25 TABLET ORAL at 20:37

## 2022-01-05 RX ADMIN — BETHANECHOL CHLORIDE 25 MG: 25 TABLET ORAL at 08:05

## 2022-01-05 RX ADMIN — BETHANECHOL CHLORIDE 25 MG: 25 TABLET ORAL at 16:06

## 2022-01-05 RX ADMIN — BACLOFEN 10 MG: 10 TABLET ORAL at 08:01

## 2022-01-05 RX ADMIN — BETHANECHOL CHLORIDE 25 MG: 25 TABLET ORAL at 02:54

## 2022-01-05 RX ADMIN — CIPROFLOXACIN 500 MG: 500 TABLET, FILM COATED ORAL at 20:18

## 2022-01-05 RX ADMIN — METHADONE HYDROCHLORIDE 10 MG: 10 TABLET ORAL at 08:01

## 2022-01-05 RX ADMIN — DILTIAZEM HYDROCHLORIDE 180 MG: 90 CAPSULE, EXTENDED RELEASE ORAL at 08:05

## 2022-01-05 RX ADMIN — BUSPIRONE HYDROCHLORIDE 30 MG: 15 TABLET ORAL at 20:35

## 2022-01-05 RX ADMIN — OXYCODONE HYDROCHLORIDE 10 MG: 10 TABLET ORAL at 12:25

## 2022-01-05 RX ADMIN — PANTOPRAZOLE SODIUM 40 MG: 40 TABLET, DELAYED RELEASE ORAL at 08:01

## 2022-01-05 RX ADMIN — METHADONE HYDROCHLORIDE 10 MG: 10 TABLET ORAL at 20:18

## 2022-01-05 RX ADMIN — BACLOFEN 10 MG: 10 TABLET ORAL at 15:25

## 2022-01-05 RX ADMIN — CIPROFLOXACIN 500 MG: 500 TABLET, FILM COATED ORAL at 08:03

## 2022-01-05 RX ADMIN — BISACODYL 10 MG: 10 SUPPOSITORY RECTAL at 23:40

## 2022-01-05 RX ADMIN — FERROUS SULFATE TAB 325 MG (65 MG ELEMENTAL FE) 325 MG: 325 (65 FE) TAB at 08:01

## 2022-01-05 RX ADMIN — BUSPIRONE HYDROCHLORIDE 30 MG: 15 TABLET ORAL at 08:05

## 2022-01-05 RX ADMIN — OXYCODONE HYDROCHLORIDE 5 MG: 5 TABLET ORAL at 20:18

## 2022-01-05 RX ADMIN — BACLOFEN 10 MG: 10 TABLET ORAL at 20:18

## 2022-01-05 ASSESSMENT — ENCOUNTER SYMPTOMS
EYES NEGATIVE: 1
RESPIRATORY NEGATIVE: 1
ABDOMINAL DISTENTION: 1

## 2022-01-05 ASSESSMENT — PAIN DESCRIPTION - PROGRESSION: CLINICAL_PROGRESSION: GRADUALLY IMPROVING

## 2022-01-05 ASSESSMENT — 9 HOLE PEG TEST
TESTTIME_SECONDS: 31
TESTTIME_SECONDS: 35

## 2022-01-05 ASSESSMENT — PAIN SCALES - GENERAL
PAINLEVEL_OUTOF10: 3
PAINLEVEL_OUTOF10: 8
PAINLEVEL_OUTOF10: 7
PAINLEVEL_OUTOF10: 0

## 2022-01-05 ASSESSMENT — PAIN DESCRIPTION - ORIENTATION: ORIENTATION: MID

## 2022-01-05 ASSESSMENT — PAIN DESCRIPTION - PAIN TYPE: TYPE: SURGICAL PAIN

## 2022-01-05 ASSESSMENT — PAIN DESCRIPTION - LOCATION: LOCATION: BACK

## 2022-01-05 ASSESSMENT — PAIN SCALES - WONG BAKER: WONGBAKER_NUMERICALRESPONSE: 0

## 2022-01-05 NOTE — CARE COORDINATION
Michael Mata RN   Registered Nurse   Case Management   Progress Notes       Signed   Date of Service:  2022  3:39 PM         Related encounter: Admission (Discharged) from 2021 in [address] [place] [address] Inpatient Rehab Preadmission Assessment     Patient Name: Piotr Lechuga        MRN:   [id number]    : 1960  (62 y.o.)  Gender: female      Admitted from:   []?[facility]  [x]? [facility]   []? [facility]   []? [facility]   []? Outside Admission - Location:                                 [x]? Initial         []? Updated     Date of Onset / Admission to the acute hospital:  21     Inpatient Rehabilitation Admitting Diagnosis:  Cauda Equina Syndrome     Did patient have surgery/procedures? []? No  [x]? Yes:       Procedure:  Laminectomy to decompress the thecal sac at L2, L3, L4, L5, S1  Use of operative microscope  Use of fluoroscopy  22 modifier     Physicians: Hilary Matos     Risk for clinical complications: Moderate     Co-morbidities:       1. Spasticity  2. Hypertension  3. Hypothyroidism  4. History of lumbar spine surgeries and cervical spine surgery post laminectomy syndrome/chronic pain  5. History of radical mastectomy  6. Depression        Financial Information  Primary insurance:  [x]? Medicare     []? Medicare HMO      []? [employer]    []? Medicaid      []? Medicaid HMO       []? Workers Compensation        []? Personal Pay     Secondary Insurance:  []? Medicare     []? Medicare HMO      []? [employer]    []? Medicaid      []? Medicaid HMO        []? Workers Compensation      [x]? None     Precautions:   []? Cardiac Precautions:            []? Total hip precautions:           []? Weight Bearing status:  [x]? Safety Precautions/Concerns:  Fall Risk, General Precautions  [x]? Visually impaired:   Wears glasses for reading        []? Hard of Hearing:      Isolation Precautions:         []? Yes [x]?No  If Yes:   []? Droplet  []? Contact           []? Airborne     []? VRE     []? MRSA        []? C-diff         []? TB             []? ESBL         []? MDRO          []? Other:                        Physiatrist:  []? Dr. Landon Benson     []? Dr. Marion Kirby  [x]? Dr. Lissa Good  []? Dr. Amaris Simmons     Patients Occupation: Disabled     Reviewed Lab and Diagnostic reports from Current Admission: Yes     Patients Prior Functional  Level: Prior Function  ADL Assistance: Independent (pt reported occasionally requiring assistance for donning shirt. pt reported signficantother assists with getting in/out shower)  Homemaking Assistance: Needs assistance  Ambulation Assistance: Independent  Transfer Assistance: Independent  Additional Comments: pt reported significant other works but able to assist PRN when home     History of current illness, per PM&R Consult:    80-year-old female with lower extremity weakness, history of hypertension and hypothyroidism as well as reflux.  Admitted for lumbar laminectomy-has had history of lumbar spine surgery in past as well cervical spine surgery. Richard Michaud has lower extremity weakness.  Difficulty with bowels and bladder.  Underwent lumbar laminectomy L2-S1 on 12/30.     Internal medicine -cauda equina syndrome with progressive lower extremity weakness ultimately wheelchair-bound with severe lumbar stenosis, underwent laminectomy L2-S1, restart antihypertensives and Protonix and Synthroid checking iron studies for anemia     Neurosurgery-baclofen for spasticity has Bruce catheter, Roxicodone for pain resuming home methadone     Prognosis: Fair     Current functional status for upper extremity ADLs:  UE Bathing: Stand by assistance  UE Dressing: Stand by assistance     Current functional status for lower extremity ADLs:  LE Bathing: Moderate assistance  LE Dressing:  Moderate assistance     Current functional status for bed, chair, wheelchair transfers:  Transfers  Sit to Stand: Minimal Assistance  Stand to sit: Contact guard assistance  Comment: Verbal cues for hand placement with good return.     Current functional status for toilet transfers: Toilet Transfers  Toilet - Technique: Ambulating  Equipment Used: Standard bedside commode  Toilet Transfer: Minimal assistance  Toilet Transfers Comments: Unsteady, no gross LOB w/RW     Current functional status for locomotion:  Ambulation  Ambulation?: Yes  More Ambulation?: No  Ambulation 1  Surface: level tile  Device: Rolling Walker  Assistance: Contact guard assistance  Quality of Gait: mildly unsteady with turning, no significant LOB  Gait Deviations: Slow Sharon,Shuffles  Distance: 20ft x1, 40ft x1     Current functional status for comprehension: Complete independence     Current functional status for expression: Complete independence     Current functional status for social interaction: Complete independence     Current functional status for problem solving: Complete independence     Current functional status for memory: Complete independence     Current Deficits R/T Impairment: Impaired Functional Mobility and Decreased ADLs     Required Therapy:   [x]? Physical Therapy  [x]? Occupational Therapy   []? Speech Therapy, as appropriate     Additional Services:    [x]?     []? Recreational Therapy, as appropriate    [x]? Nutrition    []? Dialysis  []? Cultural Needs Identified  []? Special Equipment Needs  []? Other     Rehab Justification:  Needs 3 hrs therapy per day or 15 hours per week:  Yes  Identified Rehab Nursing needs: Yes  Intense Interdisciplinary need:  Yes  Need for 24 hr physician supervision:  Yes  Measurable improved quality of life:  Yes  Willingness to participate:  Yes  Medical Necessity:  Yes  Patient able to tolerate care proposed:   Yes     Expected Discharge Destination/Functional Level:  Home with assist  Expected length of time to achieve that level of improvement: 1-2 weeks  Expected Post Discharge Treatments: Home with possible Home Care     Other information relevant to patient's care needs:  N/A     Acute Inpatient Rehabilitation Disclosure Statement will be provided to patient upon admission to ARU with patient's verbalization of understanding.       I have reviewed and concur with the findings and results of the pre-admission screening assessment completed by the Inpatient Rehabilitation Admissions Coordinator.                  Cosigned by: Trish Dacosta MD at 1/4/2022  4:14 PM

## 2022-01-05 NOTE — PATIENT CARE CONFERENCE
[facility]   ACUTE REHABILITATION  TEAM CONFERENCE NOTE  Date: 22  Patient Name: Berenda Goltz       Room: 8213/4386-25  MRN: [id number]       : 1960  (62 y.o.)     Gender: female   Referring Practitioner: Dr Delia Dior   Cauda equina syndrome [facility]) [G83.4]  Diagnosis: Lumbar stenosis with neurogenic claudication, L2-S1 laminectomy on 21 for cauda equina syndrome (Dr. Madelyn Vargas), Cervical stenosis with myelopathy, decreased BLE strength , L weaker than R,     NURSING  Bladder  Always Continent  Bowel   Always Continent  Date of Last BM: 2022  Intervention    Both Bowel & Bladder Program     Wounds/Incisions/Ulcers: Incision healing well- mid back  Medication Education Program: Patient able to manage medications and being educated by nursing  Pain: Patient's pain is currently controlled with -  [place]co    Fall Risk:  Falling star program initiated    PHYSICAL THERAPY  Bed mobility  Rolling to Left: Contact guard assistance  Rolling to Right: Minimal assistance  Supine to Sit: Minimal assistance  Sit to Supine: Moderate assistance  Scooting: Minimal assistance  Comment: I/S in log rolling technique, pt reaches for bed rail to assist with rolling. Transfers:  Sit to Stand: Minimal Assistance  Stand to sit: Minimal Assistance  Bed to Chair: Minimal assistance (Rolling walker)    Ambulation 1  Surface: level tile  Device: Rolling Walker  Assistance: Contact guard assistance;Minimal assistance  Quality of Gait: mildly unsteady with turning, no significant LOB, ambulaltes with L knee flexed, decreased clearance noted for L foot. Pt with kyphotic posture and forward head posture. Gait Deviations: Slow Sharon; Shuffles;Decreased step height;Decreased step length  Distance: 20ft x2, 44ft (2MWT)  Comments: Pt reports her B UE gets fatique quickly. Equipment Needed: No    Pt required Miryam for bed mobility for BLE progression and trunk stability.  Pt ambulated 20 to 40 ft with CGA/min A using a RW and required increased time and effort to complete due to slow christine. Pt is limited by decreased BLE strength and would benefit from continued PT following discharge to address functional deficits. Goals  Time Frame for Short term goals: 1 week  Short term goal 1: Pt will ambulate 200 feet with a RW and SBA to increase functional independence. Short term goal 2: Pt able to perform bed mobility at SBA  Short term goal 3: Pt will demonstrate good- standing balance with rolling walker to decrease fall risk. Short term goal 4: Pt will perform sit<>stand and pivot transfer with rolling walker SBA to increase functional independence. Short term goal 5: Pt will negotiate 5 stair with 2 handrail and CGA to allow the pt to enter prior living arrangements. OCCUPATIONAL THERAPY  SELF CARE      Eating   5  Setup or clean-up assistance     Setup; Dentures (L hand coordination deficits impact 2 handed tasks like cut meat)   Oral Hygiene   5  Assistance Needed: Setup or clean-up assistance     Setup   Shower/Bathe Self   3  Assistance Needed: Partial/moderate assistance      UE Bathing: Setup  LE Bathing:  Moderate assistance   Upper Body Dressing   5  Assistance Needed: Setup or clean-up assistance     Setup;Stand by assistance (t shirt)   Lower Body Dressing   3  Assistance Needed: Partial/moderate assistance     Putting On/Taking Off Footwear   3  Assistance Needed: Partial/moderate assistance      Moderate assistance   Toilet Transfer   3  Assistance Needed: Partial/moderate assistance      Toilet - Technique: Stand pivot  Equipment Used: Grab bars  Toilet Transfer: Minimal assistance  Toilet Transfers Comments: RW   Toileting Hygiene   3 Assistance Needed: Partial/moderate assistance      Moderate assistance     Shower Transfers: Minimal assistance  Balance  Sitting Balance: Independent (tub bench static and dynamic)  Standing Balance: Minimal assistance (mostly CGA- especially for static)  Standing Balance  Time: 3-5 min x 5, 7 min  Activity: adls  Comment: w/RW, unsteady with no gross LOB, can stand without UE support briefly            Short term goals  Time Frame for Short term goals: 1 week  Short term goal 1: min assist LE self care  Short term goal 2: CGA amb to obtain set up for adls with good walker technique  Short term goal 3: joseph 8-9 min stand , ambulate for adls with RW and CGA  Short term goal 4: joseph 10 reps x 2 B shld ex with 1 lb weight on L and 2 lb weight on R  and 20 reps x 2 elbow ex with 2 lb wt on L and 3 lb on R for work joseph and UE strengthening. Short term goal 5: joseph repetitive  ex LUE with mod resistance for hand strengthening. SPEECH THERAPY      NUTRITION  Weight: 142 lb 6.4 oz (64.6 kg) / Body mass index is 26.91 kg/m². Diet Rx: Regular. Appetite and intake are decreased mostly related to distended abdomen and irregular bowel function. Will continue to monitor. Please see nutrition note for details. SOCIAL WORK ASSESSMENT  Assessment:   Pre-Admission Status:  Lives With: Significant other (Emmett)  Type of Home: House  Home Layout: Two level,Able to Live on Main level with bedroom/bathroom  Home Access: Stairs to enter without rails  Entrance Stairs - Number of Steps: 1  Bathroom Shower/Tub: Tub/Shower unit,Doors  Bathroom Toilet: Standard (Toilet raiser)  Bathroom Equipment: Toilet raiser (Have a sink counter on the side of her toilet)  Bathroom Accessibility: Accessible  Home Equipment: Rolling walker,4 wheeled walker,Lift chair,Cane,Reacher (pt reported using RW majority of time)  ADL Assistance: Needs assistance (pt reported occasionally requiring assistance for donning shirt.  pt reported signficantother assists with getting in/out shower)  Homemaking Assistance: Needs assistance  Homemaking Responsibilities: Yes (pt reported splitting with IADLs)  Meal Prep Responsibility: Secondary  Laundry Responsibility: Secondary  Cleaning Responsibility: Secondary  Ambulation Assistance: Independent (Recently started using rolling walker for last 3 to 4 months)  Transfer Assistance: Independent  Active : No (pt requires assistance for getting into truck)  Patient's  Info: significant other drives, they own only 1 vehicle now, pt was driving until 4 months ago  Mode of Transportation: Truck  Occupation: On [address]: watch TV  IADL Comments: Pt sleeps in a lift recliner chair, pt has carpet most of the area. needs cervical spine surgery when able post this surgery. 25year old grandson at her house temporarily due to aunt he was staying with has covid. Additional Comments: Pt reported significant other works 5 am to 4 pm but able to assist PRN when home. worsening function over past 2-4 months - has needed more assist with advanced adls. pt does dusting, spouse vacums, pt no longer able to walk with cart through entire grocery store. has main floor laundry (3 inch step to access this room) shares laundry and cook with spouse, prior to past months pt indep all advanced adls except went together for [employer]. Family Education: Need to make contact with family to initiate education    Percentage Risk for Readmission: Low 0 - 18%   Risk of Unplanned Readmission:  15 %    Critical Items: None       Problem / Barrier Intervention / Plan  Results   Impaired functional mobility  Strengthening, functional mobility training with assistive device    Altered ADL status  Training in Modified Care strategies and use of devices and Energy Conservation techniques to support safe care performance                                 Total Self Care Score    Total Mobility Score  Admission Score:  27      Admission Score:  29  Goal:  41/42         Goal:  78/90   `  Discharge Plan   Estimated Discharge Date: 1/14/22  Home evaluation needed?  Home Evaluation Indication (NO, Requires ReEval, YES/Date): No home evaluation need indicated for patient at

## 2022-01-05 NOTE — PROGRESS NOTES
Physical Therapy    Facility/Department: [facility] ACUTE REHAB  Initial Assessment    NAME: Amanda Guerra  : 1960  MRN: [id number]    Date of Service: 2022    Discharge Recommendations:  Patient would benefit from continued therapy after discharge,Home with assist PRN   PT Equipment Recommendations  Equipment Needed: No    Assessment   Body structures, Functions, Activity limitations: Decreased functional mobility ; Decreased balance;Decreased endurance;Decreased strength; Increased pain;Decreased sensation  Assessment: Pt required Miryam for bed mobility for BLE progression and trunk stability. Pt ambulated 20 to 40 ft with CGA/min A using a RW and required increased time and effort to complete due to slow christine. Pt is limited by decreased BLE strength and would benefit from continued PT following discharge to address functional deficits. Treatment Diagnosis: Impaired fucntion  Prognosis: Good  Decision Making: Medium Complexity  PT Education: Goals; General Safety;Gait Training;Functional Mobility Training;Precautions;Transfer Training;Plan of Care;PT Role  Patient Education: Pt was educated on safety with RW and importance of OOB  REQUIRES PT FOLLOW UP: Yes  Activity Tolerance  Activity Tolerance: Patient limited by endurance; Patient Tolerated treatment well       Patient Diagnosis(es): There were no encounter diagnoses. has a past medical history of Anxiety, Arthritis, At maximum risk for fall, Cancer ([facility] [address] Cauda equina syndrome (HCC), Cervical stenosis of spine, GERD (gastroesophageal reflux disease), History of stomach ulcers, Hypertension, Hypothyroidism, Lumbar neuritis, Post laminectomy syndrome, Spinal deformity, and Thyroid disease. has a past surgical history that includes back surgery; hernia repair (Bilateral); Breast surgery (Right); cervical laminectomy (2014);  Mastectomy, radical; Hysterectomy, total abdominal; and Lumbar spine surgery (N/A, 12/30/2021). Restrictions  Restrictions/Precautions  Restrictions/Precautions: General Precautions,Surgical Protocols,Fall Risk,Up as Tolerated  Required Braces or Orthoses?: No  Position Activity Restriction  Spinal Precautions: lumbar spine surgery  Other position/activity restrictions: Activity as tolerated. Vision/Hearing  Vision: Impaired  Vision Exceptions: Wears glasses for reading  Hearing: Within functional limits     Subjective  General  Chart Reviewed: Yes  Patient assessed for rehabilitation services?: Yes  Additional Pertinent Hx:   Patient presents with decline in her mobility, LE weakness over the course of the last few months where she can barely even ambulate with the walker. Pt admitted to [facility] and underwent  lumbar laminectomy L2-S1 for cauda equina decompression on 12/30/22. Pt presents with B LE weakness, L LE > R LE. Pt admitted to rehab unit on 1/4/22. Family / Caregiver Present: No  Referring Practitioner: Dr Luis Jimenez  Referral Date : 01/04/22  Diagnosis: Cauda Equina Syndrome  Follows Commands: Within Functional Limits  General Comment  Comments: Pt with OTR at the end of session.   Pain Screening  Patient Currently in Pain: Denies  Pain Assessment  Villavicencio-Calles Pain Rating: No hurt  Clinical Progression: Gradually improving  Response to Pain Intervention: Asleep with RR greater than 10  Vital Signs  BP Location: Left upper arm  Level of Consciousness: Alert (0)  Oxygen Therapy  O2 Device: None (Room air)       Orientation  Orientation  Overall Orientation Status: Within Functional Limits  Social/Functional History  Social/Functional History  Lives With: Significant other (Emmett)  Type of Home: House  Home Layout: Two level,Able to Live on Main level with bedroom/bathroom  Home Access: Stairs to enter without rails  Entrance Stairs - Number of Steps: 1  Bathroom Shower/Tub: Tub/Shower unit,Doors  Bathroom Toilet: Standard (Toilet raiser)  Bathroom Equipment: Toilet raiser (Have a sink counter on [address] eof her toilet)  Bathroom Accessibility: Accessible  Home Equipment: Rolling walker,4 wheeled walker,Lift chair,Cane,Reacher (pt reported using RW majority of time)  ADL Assistance: Independent (pt reported occasionally requiring assistance for donning shirt. pt reported signficantother assists with getting in/out shower)  Homemaking Assistance: Needs assistance  Homemaking Responsibilities: Yes (pt reported splitting with IADLs)  Meal Prep Responsibility: Secondary  Laundry Responsibility: Secondary  Cleaning Responsibility: Secondary  Ambulation Assistance: Independent (Recently started using rolling walker fpr last 3 to 4 months)  Transfer Assistance: Independent  Active : No (pt requires assistance for getting into truck)  Patient's  Info: significant other drives  Mode of Transportation: Truck  Occupation: On disability  Leisure & Hobbies: watch TV  IADL Comments: Pt sleeps in a lift recliner chair, pt has carpet most of the area. Additional Comments: Pt reported significant other works but able to assist PRN when home  Cognition        Objective          AROM RLE (degrees)  RLE AROM: WFL  AROM LLE (degrees)  LLE AROM : WFL  LLE General AROM: AAROM  Strength RLE  Strength RLE: Exception  R Hip Flexion: 4-/5  R Knee Extension: 4/5  R Ankle Dorsiflexion: 4/5  R Ankle Plantar flexion: 4/5  Strength LLE  Strength LLE: Exception  L Hip Flexion: 3-/5  L Knee Extension: 3+/5  L Ankle Dorsiflexion: 3-/5  L Ankle Plantar Flexion: 3+/5  Strength Other  Other: R LE stronger than L LE. Sensation  Overall Sensation Status: Impaired (pt reported chronic N/T to B feet and hands, but pt reported slight improvement since surgery)  Additional Comments: Numbness/tingling B finger tips which is chronic. Pt also reports chronic numbness B LE, but reports its gettign better since the surgery. Pt reports her perineal are is still numb yet.    Bed mobility  Rolling to Left: Contact guard assistance  Rolling to Right: Minimal assistance  Supine to Sit: Minimal assistance  Sit to Supine: Moderate assistance  Scooting: Minimal assistance  Comment: I/S in log rolling technique, pt reaches for bed rail to assist with rolling. Transfers  Sit to Stand: Minimal Assistance  Stand to sit: Minimal Assistance  Bed to Chair: Minimal assistance (Rolling walker)  Comment: Verbal cues for hand placement with good return. Ambulation  Ambulation?: Yes  Ambulation 1  Surface: level tile  Device: Rolling Walker  Assistance: Contact guard assistance;Minimal assistance  Quality of Gait: mildly unsteady with turning, no significant LOB, ambulaltes with L knee flexed, decreased clearance noted for L foot. Pt with kyphotic posture and forward head posture. Gait Deviations: Slow Sharon; Shuffles;Decreased step height;Decreased step length  Distance: 20ft x2, 44ft (2MWT)  Comments: Pt reports her KIP UAI gets fatique quickly. Stairs/Curb  Stairs?: No     Balance  Posture: Fair  Sitting - Static: Good  Sitting - Dynamic: Fair;+  Standing - Static: Fair  Standing - Dynamic: Fair;-  Comments: standing balance assessed while using a RW  Other exercises  Other exercises?: Yes  Other exercises 1: Seated B LE ex's 145 reps, Lime T band ex's 15 reps. Other exercises 2: standing at hallway rail- marching in place, side ckicks, multiple breaks neded, 10 reps each. Plan   Plan  Times per week: 1.5 hr/day, 5 to 7 days/week.   Times per day: Daily  Current Treatment Recommendations: Strengthening,ROM,Stair training,Balance Training,Gait Training,Patient/Caregiver Education & Training,Equipment Evaluation, Education, & procurement,Neuromuscular Re-education,Functional Mobility Training,Endurance Training,Transfer Training,Safety Education & Training,Home Exercise Program  Safety Devices  Type of devices: Call light within reach,Gait belt,Patient at risk for falls,Nurse notified,Left in bed  Restraints  Initially in place: No    G-Code       OutComes Score                                                  AM-PAC Score             Goals  Short term goals  Time Frame for Short term goals: 1 week  Short term goal 1: Pt will ambulate 200 feet with a RW and SBA to increase functional independence. Short term goal 2: Pt able to perform bed mobility at SBA  Short term goal 3: Pt will demonstrate good- standing balance with rolling walker to decrease fall risk. Short term goal 4: Pt will perform sit<>stand and pivot transfer with rolling walker SBA to increase functional independence. Short term goal 5: Pt will negotiate 5 stair with 2 handrail and CGA to allow the pt to enter prior living arrangements. Long term goals  Time Frame for Long term goals : By DC  Long term goal 1: Pt able to perform bed mobility mod-I  Long term goal 2: Pt able to transfer at mod-I  Long term goal 3: Pt able to ambulate with rolling walker distance of 150 ft, mod-I, level surfaces  Long term goal 4:  Pt able to ambulate on incline surface at SBA with rolling walker. Long term goal 5: Pt able to get up and down curb steps with UE support, CGA  Long term goal 6: Improve 2MWT distance to 100 ft to improve gait speed and overall fucntion. Long term goal 7: Pt able to go up and 12 steps with 2 UE support at SBA to improve B LE strength.   Patient Goals   Patient goals : Go home soon       Therapy Time   Individual Concurrent Group Co-treatment   Time In 0840         Time Out 1014         Minutes 94         Timed Code Treatment Minutes: 74 Minutes       Alex Yanger, PT

## 2022-01-05 NOTE — PROGRESS NOTES
Pt arrived via wheelchair with transport. Transport staff assisted Pt into bed. Pt oriented to room. Call light within reach.

## 2022-01-05 NOTE — PROGRESS NOTES
Comprehensive Nutrition Assessment    Type and Reason for Visit:  Initial,Positive Nutrition Screen (Decreased appetite, new admission wto rehab)    Nutrition Recommendations/Plan: Continue current diet as tolerated, unless otherwise ordered. Nutrition Assessment:  Pt admitted to rehab due to cauda equina syndrome, status post L2-S1 laminectomy. Pt apparently had an ileus which was resolving, however, pt continues to complain of distended abdomen and BMs are not back to normal. this has affected appeite and intake with intake estimated at 50% of meals/usual intake. Declined oral nutrition supplements at present; will continue to monitor for possible need. Malnutrition Assessment:  Malnutrition Status: At risk for malnutrition (Comment)    Context:  Acute Illness     Findings of the 6 clinical characteristics of malnutrition:  Energy Intake:  1 - 75% or less of estimated energy requirements for 7 or more days  Weight Loss:  No significant weight loss     Body Fat Loss:  No significant body fat loss     Muscle Mass Loss:  No significant muscle mass loss    Fluid Accumulation:  No significant fluid accumulation Extremities   Strength:  Not Performed    Estimated Daily Nutrient Needs:  Energy (kcal):  8981-9635 based on [place] [employer] with 1.201.3 factor; Weight Used for Energy Requirements:  Admission     Protein (g):  67-76 based on 1.4-1.6 gm per kg; Weight Used for Protein Requirements:  Ideal          Nutrition Related Findings:  Simin Stalling (likely due to meds). Probable recent ileus. Meds and labs reviewed. Wounds:  Surgical Incision       Current Nutrition Therapies:    ADULT DIET;  Regular    Anthropometric Measures:  · Height: 5' 1\" (154.9 cm)  · Current Body Weight: 142 lb 6.7 oz (64.6 kg)   · Admission Body Weight: 142 lb 6.7 oz (64.6 kg)    · Usual Body Weight: 120 lb (54.4 kg) (per pt; unsure of time frame)     · Ideal Body Weight: 105 lbs; % Ideal Body Weight 135.6 %   · BMI: 26.9  · BMI Categories: Overweight (BMI 25.0-29. 9)       Nutrition Diagnosis:   · Inadequate oral intake related to altered GI function as evidenced by intake 26-50%    Nutrition Interventions:   Food and/or Nutrient Delivery:  Continue Current Diet  Nutrition Education/Counseling:  No recommendation at this time   Coordination of Nutrition Care:  Continue to monitor while inpatient    Goals:  PO intake % of meals with good tolerance       Nutrition Monitoring and Evaluation:   Behavioral-Environmental Outcomes:  None Identified   Food/Nutrient Intake Outcomes:  Food and Nutrient Intake  Physical Signs/Symptoms Outcomes:  Biochemical Data,Constipation,Diarrhea,GI Status,Weight     Discharge Planning:    Continue current diet (As tolerated)     Some areas of assessment may be incomplete due to standard COVID-19 Precautions. Sandra Nunez R.D., L.D.   Phone: [phone]

## 2022-01-05 NOTE — PROGRESS NOTES
[address] [facility] [place] [place] [address]   Acute Rehabilitation OT Evaluation  Date: 22  Patient Name: Osvaldo Anthony       Room: 9900/3703-10  MRN: [id number]  Account: [de-identified]   : 1960  (62 y.o.) Gender: female        Diagnosis: Lumbar stenosis with neurogenic claudication, L2-S1 laminectomy on 21 for cauda equina syndrome (Dr. Vicky Vital), Cervical stenosis with myelopathy, decreased BLE strength , L weaker than R,  Additional Pertinent Hx: worsening function over past 2 months - has needed more assist with advanced adls. wears pullups at home due to urgency issues. needs cervical spine surgery when able post this surgery. Left facial symptoms: could be early bells palsy    Treatment Diagnosis: pt with decreased adl indep post Lumbar laminectomy due to limits wilth mobility, work joseph and balance and LUE deficits due to needs neck surgery   Past Medical History:  has a past medical history of Anxiety, Arthritis, At maximum risk for fall, Cancer ([address] Cauda equina syndrome (HCC), Cervical stenosis of spine, GERD (gastroesophageal reflux disease), History of stomach ulcers, Hypertension, Hypothyroidism, Lumbar neuritis, Post laminectomy syndrome, Spinal deformity, and Thyroid disease. Past Surgical History:   has a past surgical history that includes back surgery; hernia repair (Bilateral); Breast surgery (Right); cervical laminectomy (2014); Mastectomy, radical; Hysterectomy, total abdominal; and Lumbar spine surgery (N/A, 2021). Restrictions  Restrictions/Precautions: General Precautions,Surgical Protocols,Fall Risk,Up as Tolerated  Spinal Precautions: lumbar spine surgery  Other position/activity restrictions: Activity as tolerated.   per MD discharge papers pt OK to shower 3-4 days post surgery  Required Braces or Orthoses?: No    Vitals  Temp: 98.2 °F (36.8 °C)  Pulse: 99  Resp: 18  BP: (!) 148/83  Height: 5' 1\" (154.9 cm)  Weight: 142 lb 6.4 oz (64.6 kg)  BMI (Calculated): 27  Oxygen Therapy  SpO2: 98 %  O2 Device: None (Room air)  Level of Consciousness: Alert (0)    Subjective  Subjective: \"I've been having trouble with this one. \"  LUE coordination  \"I can't stand for very long. I can't sleep for very long. \"  d/t back pain     Vision  Vision Exceptions: Wears glasses for reading  Hearing  Hearing: Within functional limits  Social/Functional History  Lives With: Significant other (Emmett)  Type of Home: [address]: Two level,Able to Live on Main level with bedroom/bathroom  Home Access: Stairs to enter without rails  Entrance Stairs - Number of Steps: 1  Bathroom Shower/Tub: Tub/Shower unit,Doors  Bathroom Toilet: Standard (Toilet raiser)  Bathroom Equipment: Toilet raiser (Have a sink counter on the side of her toilet)  Bathroom Accessibility: Accessible  Home Equipment: Rolling walker,4 wheeled walker,Lift chair,Cane,Reacher (pt reported using RW majority of time)  ADL Assistance: Needs assistance (pt reported occasionally requiring assistance for donning shirt. pt reported signficantother assists with getting in/out shower)  Homemaking Assistance: Needs assistance  Homemaking Responsibilities: Yes (pt reported splitting with IADLs)  Meal Prep Responsibility: Secondary  Laundry Responsibility: Secondary  Cleaning Responsibility: Secondary  Ambulation Assistance: Independent (Recently started using rolling walker for last 3 to 4 months)  Transfer Assistance: Independent  Active : No (pt requires assistance for getting into truck)  Patient's  Info: significant other drives, they own only 1 vehicle now, pt was driving until 4 months ago  Mode of Transportation: Truck  Occupation: On [address]: watch TV  IADL Comments: Pt sleeps in a lift recliner chair, pt has carpet most of the area. needs cervical spine surgery when able post this surgery. 25year old grandson at her house temporarily due to aunt he was staying with has covid.   Additional Left Hand Strength - Pinch (lbs)  Lateral: 11  [place] 3 point: 12     Right Hand Strength - Pinch (lbs)  Lateral: 12  [place] 3 point: 12     Quality of Movement Other  Comment: pt drops items on occasion with R and often with L, R min impaired, L mod impaired coordination       Mobility          Balance  Sitting Balance: Independent (tub bench static and dynamic)  Standing Balance: Minimal assistance (mostly CGA- especially for static)  Standing Balance  Time: 3-5 min x 5, 7 min  Activity: adls  Comment: w/RW, unsteady with no gross LOB, can stand without UE support briefly  Functional Mobility  Functional - Mobility Device: Rolling Walker  Assist Level: Minimal assistance  Functional Mobility Comments: 8 feet during adls, attempt to allow w/c mobility indep but pt needs more ed, practice- is unfamiliar and at this time needing max assist -also due to poor work tolerance. Transfers  Stand Step Transfers: Minimal assistance  Stand Pivot Transfers: Minimal assistance  Sit to stand: Contact guard assistance  Stand to sit: Contact guard assistance  Transfer Comments: w/RW  Toilet Transfers  Toilet - Technique: Stand pivot  Equipment Used: Grab bars  Toilet Transfer: Minimal assistance  Toilet Transfers Comments: RW  Shower Transfers  Shower - Transfer From: Wheelchair  Shower - Transfer Type: To and From  Shower - Transfer To: Transfer tub bench  Shower Transfers: Minimal assistance      Activity Tolerance: Patient Tolerated treatment well,Patient limited by fatigue,Patient limited by pain  Activity Tolerance: pain 7/10 back post am therapies, OT prompts pt to  request pain meds to nsg.  surgical site protected from cross contamination during shower. no bandage on before or after shower. ADL     ADL  Feeding: Setup; Dentures (L hand coordination deficits impact 2 handed tasks like cut meat)  Grooming: Setup  UE Bathing: Setup  LE Bathing:  Moderate assistance  UE Dressing: Setup;Stand by assistance (t shirt)  LE Dressing: Moderate assistance  Toileting: Moderate assistance  Additional Comments: groom from w/c seated at sink,  brushed and soaked dentures,  w/c to toilet SPT, toileting, amb 8 feet into shower, stood to wash bottom, sat for remainder of shower, washed hair, SPT out of shower, completed dressing. is able to cross to reach RLE fair, needs assist to bring LLE into crossed position. wearing t shirt, pullups, pants, teds, slipper socks. step transfer to recliner for lunch.   IRF-BRUNO Items   Eating   5  Setup or clean-up assistance     Oral Hygiene   5  Assistance Needed: Setup or clean-up assistance     Shower/Bathe Self   3  Assistance Needed: Partial/moderate assistance     Upper Body Dressing   5  Assistance Needed: Setup or clean-up assistance     Lower Body Dressing   3  Assistance Needed: Partial/moderate assistance     Putting On/Taking Off Footwear   3  Assistance Needed: Partial/moderate assistance     Toilet Transfer            Toileting Hygiene   3  Assistance Needed: Partial/moderate assistance      OT scores     Eating  Assistance Needed: Setup or clean-up assistance  CARE Score: 5  Discharge Goal: Set-up or clean-up assistance  Oral Hygiene  Assistance Needed: Setup or clean-up assistance  CARE Score: 5  Discharge Goal: Independent  Toileting Hygiene  Assistance Needed: Partial/moderate assistance  CARE Score: 3  Discharge Goal: Independent  Shower/Bathe Self  Assistance Needed: Partial/moderate assistance  CARE Score: 3  Discharge Goal: Independent  Upper Body Dressing  Assistance Needed: Setup or clean-up assistance  CARE Score: 5  Discharge Goal: Independent  Lower Body Dressing  Assistance Needed: Partial/moderate assistance  CARE Score: 3  Discharge Goal: Independent  Putting On/Taking Off Footwear  Assistance Needed: Partial/moderate assistance  CARE Score: 3  Discharge Goal: Independent (without teds)     Goals  Patient Goals   Patient goals : walk safely to be home alone while spouse at work  Short term goals  Time Frame for Short term goals: 1 week  Short term goal 1: min assist LE self care  Short term goal 2: CGA amb to obtain set up for adls with good walker technique  Short term goal 3: joseph 8-9 min stand , ambulate for adls with RW and CGA  Short term goal 4: joseph 10 reps x 2 B shld ex with 1 lb weight on L and 2 lb weight on R  and 20 reps x 2 elbow ex with 2 lb wt on L and 3 lb on R for work joseph and UE strengthening. Short term goal 5: joseph repetitive  ex LUE with mod resistance for hand strengthening. Long term goals  Time Frame for Long term goals : by discharge  Long term goal 1: mod indep self care  Long term goal 2: mod indep amb to obtain set up for adls with good walker safety  Long term goal 3: joseph 11-13 min stand mod indep during adls  Long term goal 4: mod assist with transfer to tub to use tub seat vs remove shower doors for extended tub bench transfer with min assist.  Long term goal 5: mod indep with simple advanced adls with good walker safety and good balance. Long term goals 6: indep with BUE HEP for strengthening and coordination. Long term goal 7: increase L hand  strength to 40 pounds.     Assessment  Performance deficits / Impairments: Decreased functional mobility ,Decreased ADL status,Decreased endurance,Decreased high-level IADLs,Decreased balance,Decreased coordination,Decreased sensation,Decreased posture,Decreased ROM,Decreased strength,Decreased fine motor control  Treatment Diagnosis: pt with decreased adl indep post Lumbar laminectomy due to limits wilth mobility, work joseph and balance and LUE deficits due to needs neck surgery  Prognosis: Good  Decision Making: Medium Complexity  History: Lumbar stenosis with neurogenic claudication, L2-S1 laminectomy on 12/30/21 for cauda equina syndrome (Dr. Vicky Vital), Cervical stenosis with myelopathy, decreased BLE strength , L weaker than R,  Exam: 11 performance deficits  Assistance / Modification: mod due to  limits wilth mobility, work joseph and balance and LUE deficits due to needs neck surgery  REQUIRES OT FOLLOW UP: Yes  Plan  Times per week: 5-7  Times per day: Twice a day  Current Treatment Recommendations: Strengthening,Patient/Caregiver Education & Training,Home Management Training,Equipment Evaluation, Education, & procurement,Balance Training,Functional Mobility Training,Endurance Training,Safety Education & Training,Self-Care / ADL  OT Education  OT Education: Sara Norman of [address] Strategies,Transfer Training  Patient Education: ed re back precautions, provided written info re tub bench vs tub seat, discussed this and OT 's program if pt wants to resume driving in future and is unsure of safety. discussed use of AE for LE self care.   ed to don LLE into pants 1st (less function LLE)       OT Individual Minutes  Time In: 2268  Time Out: 9188  Minutes: 119    Electronically signed by Shelia Olmedo OT on 1/5/22 at 4:54 PM EST

## 2022-01-05 NOTE — CONSULTS
Progress Note    1/5/2022 7:40 AM  Subjective: Interval History: Patient states she has mild numbness and weird feeling in the left mid to lower face and left upper SCM area. This started last night. No pain, no other numbness that is new. She has been distended in the abdomen but states she is getting BM with the Miralax. No N/V. decrased appetite. Reviewed d/c and KUB and labs. Diet: ADULT DIET; Regular    Medications:   Reviewed medications    Labs:   CBC: Recent Labs     01/05/22  0711   WBC 6.0   HGB 9.7*        BMP:    Recent Labs     01/03/22  0508      K 3.8   CL 98   CO2 24   BUN 13   CREATININE 0.69   GLUCOSE 100*     INR: No results for input(s): INR in the last 72 hours. Objective:   Vitals: /74   Pulse 90   Temp 98.6 °F (37 °C) (Oral)   Resp 18   Ht 5' 1\" (1.549 m)   Wt 142 lb 6.4 oz (64.6 kg)   BMI 26.91 kg/m²   General appearance: alert and cooperative with exam  Neck: no adenopathy, no carotid bruit, no JVD and thyroid not enlarged, symmetric, no tenderness/mass/nodules  Lungs: clear to auscultation bilaterally  Heart: regular rate and rhythm, S1, S2 normal, no murmur, click, rub or gallop  Abdomen: normal findings: bowel sounds normal and soft, non-tender and abnormal findings:  distended  Extremities: extremities normal, atraumatic, no cyanosis or edema  Neurologic: Mental status: Alert, oriented, thought content appropriate, Facial muscles move normally, Normal jaw excursion. Tongue goes slightly to the right    Assessment and Plan:   Post op Cauda equina  Left facial symptoms: could be early bells palsy  Abdomen distension    P: KUB, monitor facial muscles: if they become weak then need PT on her face and oral steroids.      Patient Active Problem List:     Cervical stenosis of spine     Essential hypertension     Hypothyroidism     Lumbar radiculopathy, chronic     Lumbar neuritis     Post laminectomy syndrome     Hypokalemia     Pain of right hip joint     Post-menopausal osteoporosis     Chronic gastritis without bleeding     Elevated CEA     Esophageal dysphagia     Hypotension due to drugs     Ulcerative esophagitis     Weight loss, abnormal     Cervical myelopathy (HCC)     Lumbar stenosis with neurogenic claudication     Spinal deformity     Anxiety about health     Cauda equina compression (HCC)     Anemia, normocytic normochromic     Iron deficiency     Pseudomonas urinary tract infection     Hypocalcemia     Cauda equina syndrome [facility])      Electronically signed by Ana Luisa Arrington MD on 1/5/2022 at 7:40 AM

## 2022-01-05 NOTE — H&P
Physical Medicine & Rehabilitation History and Physical  [facility]     Primary care provider: Ana Luisa Arrington MD     Chief Complaint and Reason for Rehabilitation Admission:   Cauda equina syndrome status post L2-S1 laminectomy    History of Present Illness:  Osvaldo Anthony  is a 62 y.o.  female admitted to the [address] [place] [address] on 1/4/2022.         81-year-old female Ms. Li 12/30. Has history of hypertension hypothyroidism as well as reflux. Need for lumbar laminectomy. She has history of possible cauda equina syndrome. Has history of lumbar spine surgery in past as well as spinal lumbar surgery in past as well as cervical spine surgery. .  She notes lower extremity weakness. Notes some difficulty with bowels or bladder. Underwent lumbar laminectomy L2 S1 on 12/30. General surgery noticed passing flatus not had bowel movement as of 1/3 no advancing diet    Internal medicine 1/4 question large BM 1/4 ,drain removed 1/3 ,completed ciprofloxacin through 1/13 for UTI , urinary retention resolved with bowel movement , Bruce removed without retention continue iron for anemia-use as needed    is currently requiring assistance for self-care activities and mobility prompting this admission. Radiology  XR ABDOMEN (KUB) (SINGLE AP VIEW)    Result Date: 1/5/2022  Gas-filled loops of small large bowel are identified with improving appearing ileus or less likely partial obstruction. XR ABDOMEN (KUB) (SINGLE AP VIEW)    Result Date: 1/2/2022  1. Gaseous distension of small and large bowel which could be related to an ileus versus bowel obstruction. MRI lumbar spine 11/20  1. Advanced multilevel degenerative changes with severe spinal canal stenosis   at L2-3, L3-4, and L4-5.  There is moderate spinal canal narrowing at L1-2.   2. Advanced multilevel lateral recess and neural foraminal stenosis as above.    3. Levoscoliosis with anterolisthesis at L3-4.   4. There is mild endplate edema at V1-8 with fluid in the intervertebral disc   space.  This is likely degenerative with Modic type 1 marrow change. Correlation for any signs or symptoms of infection is recommended.      MRI thoracic spine 11/20  1. No acute abnormality of the thoracic spine or finding to suggest etiology   of patient's symptoms. 2. Mild multilevel spinal canal stenosis. 3. Mild smooth thoracic dextroscoliosis and 2 mm degenerative anterolisthesis   of T2 on T3.      MRI cervical spine 11/20  1. Multilevel degenerative changes in the cervical spine with moderate to   severe spinal canal stenosis at C3-4, moderate spinal canal stenosis at C4-5,   and mild to moderate spinal canal stenosis at C5-6 and C7-T1.   2. Multilevel neural foraminal narrowing as above. 3. Areas of hyperintensity in the cord at C4-5 and C6, likely myelomalacia. 4. Grade 1 anterolisthesis at C3-4 and C4-5. Premorbid function:  Modified independent though just prior to admission was mostly wheelchair level  Current Function:  PT:  Restrictions/Precautions: General Precautions,Surgical Protocols,Fall Risk,Up as Tolerated  Other position/activity restrictions: Activity as tolerated. Transfers  Sit to Stand: Minimal Assistance  Stand to sit: Minimal Assistance  Bed to Chair: Minimal assistance (Rolling walker)  Comment: Verbal cues for hand placement with good return. Ambulation 1  Surface: level tile  Device: Rolling Walker  Assistance: Contact guard assistance,Minimal assistance  Quality of Gait: mildly unsteady with turning, no significant LOB, ambulaltes with L knee flexed, decreased clearance noted for L foot. Pt with kyphotic posture and forward head posture. Gait Deviations: Slow Sharon,Shuffles,Decreased step height,Decreased step length  Distance: 20ft x2, 44ft (2MWT)  Comments: Pt reports her B UE gets fatique quickly.          OT:   1/3  ADL  Feeding: Stand by assistance;Setup; Dentures; Increased time to complete (able to open most containers and feed self)  Grooming: Stand by assistance; Moderate assistance;Setup;Verbal cueing; Increased time to complete (SBA-wash face, brush teeth, comb hair, Mod-wash/dry hair)  Toileting: Maximum assistance;Setup; Increased time to complete (assist to complete juanita care and to change brief, incontinent of bowels, has mario cath)      ST:         Past Medical History:      Diagnosis Date    Anxiety     Arthritis     At maximum risk for fall 12/29/2021    caudal equina syndrome and cervical stenosis    Cancer (HCC)     right breast    Cauda equina syndrome (HCC) 12/29/2021    neuropathy, mobility difficulty, incontinance    Cervical stenosis of spine 12/29/2021    GERD (gastroesophageal reflux disease)     History of stomach ulcers     Hypertension     Hypothyroidism     Lumbar neuritis 9/8/2016    Post laminectomy syndrome 9/8/2016    Spinal deformity 11/2021    cervical and lumbar    Thyroid disease        Past Surgical History:      Procedure Laterality Date    BACK SURGERY      x 2    BREAST SURGERY Right     mastectomy with reconstruction    CERVICAL LAMINECTOMY  11/04/2014    cervicle laminectomy c4-c6    HERNIA REPAIR Bilateral     inguinal    HYSTERECTOMY, TOTAL ABDOMINAL      LUMBAR SPINE SURGERY N/A 12/30/2021    LUMBAR LAMINECTOMY L2-S1 performed by Juliann Plasencia DO at [facility]         Allergies:    Latex and Kiwi extract    Medications   Scheduled Meds:   enoxaparin  40 mg SubCUTAneous Daily    baclofen  10 mg Oral TID    bethanechol  25 mg Oral Q6H    busPIRone  30 mg Oral BID    ciprofloxacin  500 mg Oral 2 times per day    dilTIAZem  180 mg Oral Daily    ferrous sulfate  325 mg Oral BID WC    levothyroxine  88 mcg Oral Daily    methadone  10 mg Oral BID    pantoprazole  40 mg Oral Daily    sennosides-docusate sodium  2 tablet Oral Daily    polyethylene glycol  17 g Oral Daily     Continuous Infusions:  PRN Meds:.ondansetron **OR** [DISCONTINUED] ondansetron, oxyCODONE, oxyCODONE, magnesium hydroxide, acetaminophen, senna, bisacodyl       Diagnostics:       CBC:   Recent Labs     01/03/22  0508 01/04/22  1015 01/05/22  0711   WBC 9.0 9.9 6.0   RBC 2.86* 2.96* 2.95*   HGB 9.2* 9.7* 9.7*   HCT 28.9* 30.0* 28.5*   .0 101.4 96.6   RDW 12.9 12.5 13.3    283 306     BMP:   Recent Labs     01/03/22  0508 01/05/22  0711    135   K 3.8 3.9   CL 98 97*   CO2 24 27   BUN 13 9   CREATININE 0.69 0.63     BNP: No results for input(s): BNP in the last 72 hours. PT/INR: No results for input(s): PROTIME, INR in the last 72 hours. APTT: No results for input(s): APTT in the last 72 hours. CARDIAC ENZYMES: No results for input(s): CKMB, CKMBINDEX, TROPONINT in the last 72 hours. Invalid input(s): CKTOTAL;3  FASTING LIPID PANEL:  Lab Results   Component Value Date    CHOL 198 07/23/2020     (A) 07/23/2020    TRIG 96 07/23/2020     LIVER PROFILE: No results for input(s): AST, ALT, ALB, BILIDIR, BILITOT, ALKPHOS in the last 72 hours. I/O (24Hr): Intake/Output Summary (Last 24 hours) at 1/5/2022 1121  Last data filed at 1/5/2022 0639  Gross per 24 hour   Intake --   Output 556 ml   Net -556 ml       Glu last 24 hour  No results for input(s): POCGLU in the last 72 hours. No results for input(s): CLARITYU, COLORU, PHUR, SPECGRAV, PROTEINU, RBCUA, BLOODU, BACTERIA, NITRU, WBCUA, LEUKOCYTESUR, YEAST, GLUCOSEU, BILIRUBINUR in the last 72 hours.        Social History:  Social/Functional History  Lives With: Significant other  Type of Home: House  Home Layout: Two level,Able to Live on Main level with bedroom/bathroom  Home Access: Stairs to enter without rails  Entrance Stairs - Number of Steps: 1  Bathroom Shower/Tub: Tub/Shower unit  Bathroom Toilet: Standard  Bathroom Equipment: Toilet raiser  Home Equipment: Rolling walker,4 wheeled walker (pt reported using RW intact. Object naming intact. Repetition intact. Basic cognition intact. NECK:  ROM functional.  Carotid bruit negative. THORAX:  Symmetrical.    LUNGS:  Clear to ausculation. HEART:  Regular. No murmurs of gallops. ABDOMEN: Firm distended. Hyperactive bowel sounds. Mild tenderness,   BACK:  No ulcers or deformity. EXTREMITIES:  PROM within functional limits. No calf tenderness, edema. Feet warm. NEUROMUSCULAR:  Sensation intact, no extinction. Coordination smooth. Motor testing 4 my/5 left  and intrinsics otherwise 4+/5 upper extremities, lower extremity distally 4+/5 proximally limited though appears to be at least antigravity. Balance impaired. SKIN:  abnormal suture site with staples, approximately clean distally mild redness about incision possible staple reaction-no drainage. Principal Diagnosis/plan:  Ambulatory and ADL dysfunction secondary to cauda equina status post lumbar laminectomy    She will benefit from intensive interdisciplinary therapies and rehab nursing care and is appropriate for inpatient rehabilitation. The post admission physician evaluation (NANETTE) is consistent with the pre-admission assessment. See above findings to reflect the elements required in the NANETTE. Patient's admitting condition is consistent with the findings of the preadmission assessment by the rehabilitation admissions coordinator. Other Diagnoses/plan:    1. Ambulatory and ADL dysfunction due to cauda equina status post lumbar laminectomy-continue PT/OT/nursing to work on transfers, ambulation, ADLs, steps, family training home goal approximately 7 to 10 days, at intermittent supervision level, prognosis good  2. Spinal stenosis-monitor incision, mild erythema distally possible staple reaction-monitor closely, no drainage noted, patient currently on Cipro  3. -UTI -had urinary retention improved with bowel movement-monitor check PVR and follow-up bladder protocol, on Cipro through 1/13? (Urine culture 1/1 sensitive to Cipro) per internal medicine at [address] [place] [address]. [facility],, continue Urecholine  4. Neurogenic bowel/GI/constipation/ileus-abdominal film showing gas-filled loops with improving appearing ileus or less likely partial obstruction-will consult general surgery-(distended abdomen, hypoactive bowel sounds, ileus versus partial obstruction), on suppository as needed, milk of magnesia, MiraLAX, Senokot as needed and Senokot-S twice daily  5. GERD-Protonix  6. Spasticity-baclofen  7. Chronic pain/history of lumbar surgeries and cervical spine surgery/postlaminectomy syndrome-on methadone-follows with Dr. Lionel Garcia- will follow up on discharge, currently on methadone 10 twice daily home dose and oxycodone-patient advised to minimize Roxicodone  8. Hypertension-diltiazem  9. Depression-BuSpar  10. Hypothyroid-Synthroid  11. Anemia-on iron- hold due to constipation, hemoglobin 9.7  12. DVT prophylaxis-Lovenox  13. Family practice-Dr. Vernie Goltz for medical management    DVT Prophylaxis:  low molecular weight heparin, SCD's while in bed and HERMINIO's while in bed    Estimated Length of Stay:  2 weeks. Prognosis  fair    Goals    Home at Supervision   Intermittent      Hakeem Muir. Shaquille Green MD       This note is created with the assistance of a speech recognition program.  While intending to generate a document that actually reflects the content of the visit, the document can still have some errors including those of syntax and sound a like substitutions which may escape proof reading.   In such instances, actual meaning can be extrapolated by contextual diversion

## 2022-01-05 NOTE — CONSULTS
General Surgery  Consult    PATIENT NAME: Bailee Rivers  AGE: 62 y.o. MEDICAL RECORD NO. [id number]  DATE: 1/5/2022  SURGEON: Bushra Thomas  PRIMARY CARE PHYSICIAN: Nunu Arriola MD    Patient evaluated at the request of  Dr. Yunior Fermin  Reason for evaluation: ileus     Patient information was obtained from patient and past medical records. History/Exam limitations: none. IMPRESSION:     Patient Active Problem List   Diagnosis    Cervical stenosis of spine    Essential hypertension    Hypothyroidism    Lumbar radiculopathy, chronic    Lumbar neuritis    Post laminectomy syndrome    Hypokalemia    Pain of right hip joint    Post-menopausal osteoporosis    Chronic gastritis without bleeding    Elevated CEA    Esophageal dysphagia    Hypotension due to drugs    Ulcerative esophagitis    Weight loss, abnormal    Cervical myelopathy (HCC)    Lumbar stenosis with neurogenic claudication    Spinal deformity    Anxiety about health    Cauda equina compression (HCC)    Anemia, normocytic normochromic    Iron deficiency    Pseudomonas urinary tract infection    Hypocalcemia    Cauda equina syndrome ([address]   1. S/p laminectomy for cauda equina  Neurogenic bladder  Neurogenic bowel  Constipation  Ileus post op  Use of chronic narcotics- methadone  Use of acute narcotics   Aggressive bowel regimen    Daily dulcolax with rectal stimulation    Goal is 1-2 BMs a day- use enemas etc as needed.     Scheduled bowel meds   Ensure bladder is emptyingcontinue bladder scanning and straight    cath >350ml   Consider Start relistor as appropriate   Minimize narcotics, decrease to home regimen    No operative intervention planned      HISTORY:   History of Chief Complaint:    Bailee Rivers is a 62 y.o. female rehab admit 1/4/2022 s/p lumbar laminectomy r/t cauda equina syndrome at [facility].  Had problems with constipation, neurogenic bowel, neurogenic bladder prior to [facility].  Plain film without evidence of surgical lesion. Past Medical History   has a past medical history of Anxiety, Arthritis, At maximum risk for fall, Cancer ([facility] [address] Cauda equina syndrome (HCC), Cervical stenosis of spine, GERD (gastroesophageal reflux disease), History of stomach ulcers, Hypertension, Hypothyroidism, Lumbar neuritis, Post laminectomy syndrome, Spinal deformity, and Thyroid disease. Past Surgical History   has a past surgical history that includes back surgery; hernia repair (Bilateral); Breast surgery (Right); cervical laminectomy (11/04/2014); Mastectomy, radical; Hysterectomy, total abdominal; and Lumbar spine surgery (N/A, 12/30/2021). Medications  Prior to Admission medications    Medication Sig Start Date End Date Taking? Authorizing Provider   pantoprazole (PROTONIX) 40 MG tablet TAKE ONE TABLET BY MOUTH DAILY 12/20/21  Yes Fidelia Gray MD   busPIRone (BUSPAR) 30 MG tablet Take 30 mg by mouth 2 times daily 12/17/21  Yes Fidelia Gray MD   levothyroxine (SYNTHROID) 88 MCG tablet Take 1 tablet by mouth Daily 12/17/21 1/16/22 Yes Fidelia Gray MD   dilTIAZem (DILACOR XR) 180 MG extended release capsule Take 1 capsule by mouth daily 12/17/21  Yes Fidelia Gray MD   tiZANidine (ZANAFLEX) 4 MG tablet Take 4 mg by mouth every 12 hours   Yes Historical Provider, MD   Vitamin D, Ergocalciferol, 50 MCG (2000 UT) CAPS TAKE ONE CAPSULE BY MOUTH DAILY 3/29/21  Yes Yumi Guzman PA-C   HYDROcodone-acetaminophen (NORCO) 7.5-325 MG per tablet Up to three tablets a day. 10/21/15  Yes Historical Provider, MD   methadone (DOLOPHINE) 10 MG tablet Take 10 mg by mouth 2 times daily.    Yes Historical Provider, MD   Potassium Gluconate 595 (99 K) MG TABS TAKE ONE TABLET BY MOUTH DAILY 1/3/22   Fidelia Gray MD    Scheduled Meds:   enoxaparin  40 mg SubCUTAneous Daily    baclofen  10 mg Oral TID    bethanechol  25 mg Oral Q6H    busPIRone  30 mg Oral BID    ciprofloxacin  500 mg Oral 2 times per day    dilTIAZem  180 mg Oral Daily    [Held by provider] ferrous sulfate  325 mg Oral BID     levothyroxine  88 mcg Oral Daily    methadone  10 mg Oral BID    pantoprazole  40 mg Oral Daily    sennosides-docusate sodium  2 tablet Oral Daily    polyethylene glycol  17 g Oral Daily     Continuous Infusions:  PRN Meds:.ondansetron **OR** [DISCONTINUED] ondansetron, oxyCODONE, oxyCODONE, magnesium hydroxide, acetaminophen, senna, bisacodyl  Allergies  is allergic to latex and kiwi extract. Family History  family history includes Cancer in her mother; Heart Disease in her mother; Hypertension in her mother. Social History   reports that she quit smoking about 8 years ago. She has a 15.00 pack-year smoking history. She has never used smokeless tobacco.   reports current alcohol use. reports no history of drug use. Review of Systems    Review of Systems   Constitutional: Positive for appetite change. HENT: Negative. Eyes: Negative. Respiratory: Negative. Cardiovascular: Negative. Gastrointestinal: Positive for abdominal distention. Endocrine: Negative. Genitourinary: Positive for difficulty urinating. Musculoskeletal: Positive for gait problem. Skin: Negative. Neurological: Positive for weakness. Psychiatric/Behavioral: Negative. PHYSICAL:   VITALS:  height is 5' 1\" (1.549 m) and weight is 142 lb 6.4 oz (64.6 kg). Her oral temperature is 98.2 °F (36.8 °C). Her blood pressure is 148/83 (abnormal) and her pulse is 99. Her respiration is 18 and oxygen saturation is 98%. Afeb/vss/labs no significant abnormality pvr- being performed o/w voiding, regular diet  Per chart had 3 BMs past 24 h, no BM for 5-6 days prior. On iron supplements, hypothyroid, declining some bowel meds on methadone chronically, taking oxy IR 10 ordered q4 prn- took 2 in past 24 h    Physical Exam  Constitutional:       Appearance: Normal appearance. HENT:      Head: Normocephalic.       Nose: Nose normal.   Eyes: Extraocular Movements: Extraocular movements intact. Pupils: Pupils are equal, round, and reactive to light. Cardiovascular:      Pulses: Normal pulses. Pulmonary:      Effort: Pulmonary effort is normal.   Abdominal:      Comments: Gaseous distention noted   Skin:     Capillary Refill: Capillary refill takes less than 2 seconds. Neurological:      General: No focal deficit present. Mental Status: She is alert. Psychiatric:         Mood and Affect: Mood normal.         Behavior: Behavior normal.         Thought Content: Thought content normal.         LABS:     Recent Labs     01/03/22  0508 01/04/22  1015 01/05/22  0711   WBC 9.0 9.9 6.0   HGB 9.2* 9.7* 9.7*   HCT 28.9* 30.0* 28.5*    283 306     --  135   K 3.8  --  3.9   CL 98  --  97*   CO2 24  --  27   BUN 13  --  9   CREATININE 0.69  --  0.63   CALCIUM 8.7  --  8.5*     No results for input(s): ALKPHOS, ALT, AST, BILITOT, BILIDIR, LABALBU, AMYLASE, LIPASE in the last 72 hours. RADIOLOGY:     XR ABDOMEN (KUB) (SINGLE AP VIEW)   Final Result   Gas-filled loops of small large bowel are identified with improving appearing   ileus or less likely partial obstruction.                  CASSANDRA Rucker - CNP  1/5/2022, 12:53 PM

## 2022-01-05 NOTE — PLAN OF CARE
Nutrition Problem #1: Inadequate oral intake  Intervention: Food and/or Nutrient Delivery: Continue Current Diet  Nutritional Goals: PO intake % of meals with good tolerance

## 2022-01-05 NOTE — PLAN OF CARE
Problem: Pain:  Goal: Pain level will decrease  Description: Pain level will decrease  1/5/2022 1413 by Timur Huggins RN  Outcome: Ongoing  Note: Patient's pain is well controlled with current regimen. See MAR. Problem: Falls - Risk of:  Goal: Will remain free from falls  Description: Will remain free from falls  1/5/2022 1413 by Timur Huggins RN  Outcome: Ongoing  Note: Patient remains free of falls and injuries throughout shift. Bed remains in the lowest position, wheels locked, call light and bedside table are within reach. Problem: Skin Integrity:  Goal: Will show no infection signs and symptoms  Description: Will show no infection signs and symptoms  Outcome: Ongoing  Note: No signs of increased skin or tissue breakdown is noted. See Head to Toe/LDA assessments in flowsheets.       Problem: Musculor/Skeletal Functional Status  Goal: Highest potential functional level  Outcome: Ongoing

## 2022-01-05 NOTE — PROGRESS NOTES
Messaged Dr. Justo Quintanilla via [employer] regarding new consult. Dr. Justo Quintanilla is covering for Dr. Laura Augustine.

## 2022-01-06 PROCEDURE — 6360000002 HC RX W HCPCS: Performed by: NURSE PRACTITIONER

## 2022-01-06 PROCEDURE — 99232 SBSQ HOSP IP/OBS MODERATE 35: CPT | Performed by: PHYSICAL MEDICINE & REHABILITATION

## 2022-01-06 PROCEDURE — 97110 THERAPEUTIC EXERCISES: CPT

## 2022-01-06 PROCEDURE — 51798 US URINE CAPACITY MEASURE: CPT

## 2022-01-06 PROCEDURE — 6370000000 HC RX 637 (ALT 250 FOR IP): Performed by: FAMILY MEDICINE

## 2022-01-06 PROCEDURE — 99232 SBSQ HOSP IP/OBS MODERATE 35: CPT | Performed by: FAMILY MEDICINE

## 2022-01-06 PROCEDURE — 97116 GAIT TRAINING THERAPY: CPT

## 2022-01-06 PROCEDURE — 6370000000 HC RX 637 (ALT 250 FOR IP): Performed by: NURSE PRACTITIONER

## 2022-01-06 PROCEDURE — 6360000002 HC RX W HCPCS: Performed by: PHYSICAL MEDICINE & REHABILITATION

## 2022-01-06 PROCEDURE — 6370000000 HC RX 637 (ALT 250 FOR IP): Performed by: PHYSICAL MEDICINE & REHABILITATION

## 2022-01-06 PROCEDURE — 1180000000 HC REHAB R&B

## 2022-01-06 PROCEDURE — 97530 THERAPEUTIC ACTIVITIES: CPT

## 2022-01-06 PROCEDURE — 2580000003 HC RX 258: Performed by: PHYSICAL MEDICINE & REHABILITATION

## 2022-01-06 RX ORDER — HYDROCODONE BITARTRATE AND ACETAMINOPHEN 7.5; 325 MG/1; MG/1
1 TABLET ORAL EVERY 8 HOURS PRN
Status: DISCONTINUED | OUTPATIENT
Start: 2022-01-06 | End: 2022-01-14 | Stop reason: HOSPADM

## 2022-01-06 RX ORDER — DOCUSATE SODIUM 100 MG/1
100 CAPSULE, LIQUID FILLED ORAL 2 TIMES DAILY
Status: DISCONTINUED | OUTPATIENT
Start: 2022-01-06 | End: 2022-01-07

## 2022-01-06 RX ORDER — BETHANECHOL CHLORIDE 25 MG/1
25 TABLET ORAL 3 TIMES DAILY
Status: DISCONTINUED | OUTPATIENT
Start: 2022-01-06 | End: 2022-01-07

## 2022-01-06 RX ORDER — SENNA PLUS 8.6 MG/1
2 TABLET ORAL NIGHTLY
Status: DISCONTINUED | OUTPATIENT
Start: 2022-01-06 | End: 2022-01-07

## 2022-01-06 RX ADMIN — SENNOSIDES 17.2 MG: 8.6 TABLET, COATED ORAL at 20:38

## 2022-01-06 RX ADMIN — BACLOFEN 10 MG: 10 TABLET ORAL at 20:39

## 2022-01-06 RX ADMIN — CEFAZOLIN 2000 MG: 10 INJECTION, POWDER, FOR SOLUTION INTRAVENOUS at 20:44

## 2022-01-06 RX ADMIN — LEVOTHYROXINE SODIUM 88 MCG: 0.09 TABLET ORAL at 06:23

## 2022-01-06 RX ADMIN — METHADONE HYDROCHLORIDE 10 MG: 10 TABLET ORAL at 20:39

## 2022-01-06 RX ADMIN — BUSPIRONE HYDROCHLORIDE 30 MG: 15 TABLET ORAL at 10:40

## 2022-01-06 RX ADMIN — DILTIAZEM HYDROCHLORIDE 180 MG: 90 CAPSULE, EXTENDED RELEASE ORAL at 10:42

## 2022-01-06 RX ADMIN — METHADONE HYDROCHLORIDE 10 MG: 10 TABLET ORAL at 10:38

## 2022-01-06 RX ADMIN — ENOXAPARIN SODIUM 40 MG: 100 INJECTION SUBCUTANEOUS at 10:38

## 2022-01-06 RX ADMIN — DOCUSATE SODIUM 50MG AND SENNOSIDES 8.6MG 2 TABLET: 8.6; 5 TABLET, FILM COATED ORAL at 10:38

## 2022-01-06 RX ADMIN — DOCUSATE SODIUM 100 MG: 100 CAPSULE ORAL at 20:38

## 2022-01-06 RX ADMIN — BETHANECHOL CHLORIDE 25 MG: 25 TABLET ORAL at 20:42

## 2022-01-06 RX ADMIN — BETHANECHOL CHLORIDE 25 MG: 25 TABLET ORAL at 15:38

## 2022-01-06 RX ADMIN — POLYETHYLENE GLYCOL 3350 17 G: 17 POWDER, FOR SOLUTION ORAL at 10:38

## 2022-01-06 RX ADMIN — OXYCODONE HYDROCHLORIDE 10 MG: 10 TABLET ORAL at 03:24

## 2022-01-06 RX ADMIN — BETHANECHOL CHLORIDE 25 MG: 25 TABLET ORAL at 10:39

## 2022-01-06 RX ADMIN — DOCUSATE SODIUM 100 MG: 100 CAPSULE ORAL at 15:37

## 2022-01-06 RX ADMIN — CIPROFLOXACIN 500 MG: 500 TABLET, FILM COATED ORAL at 10:39

## 2022-01-06 RX ADMIN — BACLOFEN 10 MG: 10 TABLET ORAL at 15:37

## 2022-01-06 RX ADMIN — BETHANECHOL CHLORIDE 25 MG: 25 TABLET ORAL at 03:24

## 2022-01-06 RX ADMIN — PANTOPRAZOLE SODIUM 40 MG: 40 TABLET, DELAYED RELEASE ORAL at 10:38

## 2022-01-06 RX ADMIN — HYDROCODONE BITARTRATE AND ACETAMINOPHEN 1 TABLET: 7.5; 325 TABLET ORAL at 10:38

## 2022-01-06 RX ADMIN — CIPROFLOXACIN 500 MG: 500 TABLET, FILM COATED ORAL at 20:42

## 2022-01-06 RX ADMIN — CEFAZOLIN 2000 MG: 10 INJECTION, POWDER, FOR SOLUTION INTRAVENOUS at 15:39

## 2022-01-06 RX ADMIN — HYDROCODONE BITARTRATE AND ACETAMINOPHEN 1 TABLET: 7.5; 325 TABLET ORAL at 20:48

## 2022-01-06 RX ADMIN — BACLOFEN 10 MG: 10 TABLET ORAL at 10:39

## 2022-01-06 RX ADMIN — BUSPIRONE HYDROCHLORIDE 30 MG: 15 TABLET ORAL at 20:42

## 2022-01-06 ASSESSMENT — PAIN SCALES - GENERAL
PAINLEVEL_OUTOF10: 5
PAINLEVEL_OUTOF10: 0
PAINLEVEL_OUTOF10: 0
PAINLEVEL_OUTOF10: 7
PAINLEVEL_OUTOF10: 8
PAINLEVEL_OUTOF10: 7
PAINLEVEL_OUTOF10: 5
PAINLEVEL_OUTOF10: 7

## 2022-01-06 ASSESSMENT — PAIN DESCRIPTION - PAIN TYPE
TYPE: CHRONIC PAIN
TYPE: CHRONIC PAIN

## 2022-01-06 ASSESSMENT — PAIN DESCRIPTION - DESCRIPTORS: DESCRIPTORS: SORE;ACHING

## 2022-01-06 ASSESSMENT — PAIN DESCRIPTION - ORIENTATION
ORIENTATION: MID;LOWER
ORIENTATION: MID

## 2022-01-06 ASSESSMENT — PAIN DESCRIPTION - LOCATION
LOCATION: BACK
LOCATION: BACK

## 2022-01-06 NOTE — CARE COORDINATION
Patient Health Questionnaire-9 (PHQ-9)    Over the last 2 weeks, how often have you been bothered by any of the following problems? 1. Little Interest or pleasure in doing things? [x] Not at all  [] Several Days  [] More than half the day  []  Nearly every day    2. Feeling down, depressed or hopeless? [x] Not at all  [] Several Days  [] More than half the day  []  Nearly every day    3. Trouble falling or staying asleep, or sleeping too much? [x] Not at all  [] Several Days  [] More than half the day  []  Nearly every day    4. Feeling tired or having little energy? [x] Not at all  [] Several Days  [] More than half the day  []  Nearly every day    5. Poor apettite or overeating? [x] Not at all  [] Several Days  [] More than half the day  []  Nearly every day    6. Feeling bad about yourself-or that you are a failure or have let yourself or your family down? [x] Not at all  [] Several Days  [] More than half the day  []  Nearly every day    7. Trouble concentrating on things, such as reading the newspaper or watching television? [x] Not at all  [] Several Days  [] More than half the day  []  Nearly every day    8. Moving or speaking so slowly that other people could have noticed? Or the opposite-being so fidgety or restless that you have been moving around a lot more than usual?   [x] Not at all  [] Several Days  [] More than half the day  []  Nearly every day    9. Thoughts that you would be better off dead or of hurting yourself in some way? [x] Not at all  [] Several Days  [] More than half the day  []  Nearly every day    Total Score: 0  If you checked off any problems, how difficult have these problems made it for you to do your work, take care of things at home, or get along with other people?    [x] Not difficult at all  [] Somewhat Difficult  [] Very Difficult  []  Extremely DifficultPatient Health Questionnaire-9 (PHQ-9)    Over the last 2 weeks, how often have you been bothered by any of the following problems? 1. Little Interest or pleasure in doing things? [x] Not at all  [] Several Days  [] More than half the day  []  Nearly every day    2. Feeling down, depressed or hopeless? [x] Not at all  [] Several Days  [] More than half the day  []  Nearly every day    3. Trouble falling or staying asleep, or sleeping too much? [x] Not at all  [] Several Days  [] More than half the day  []  Nearly every day    4. Feeling tired or having little energy? [x] Not at all  [] Several Days  [] More than half the day  []  Nearly every day    5. Poor apettite or overeating? [x] Not at all  [] Several Days  [] More than half the day  []  Nearly every day    6. Feeling bad about yourself-or that you are a failure or have let yourself or your family down? [x] Not at all  [] Several Days  [] More than half the day  []  Nearly every day    7. Trouble concentrating on things, such as reading the newspaper or watching television? [x] Not at all  [] Several Days  [] More than half the day  []  Nearly every day    8. Moving or speaking so slowly that other people could have noticed? Or the opposite-being so fidgety or restless that you have been moving around a lot more than usual?   [x] Not at all  [] Several Days  [] More than half the day  []  Nearly every day    9. Thoughts that you would be better off dead or of hurting yourself in some way? [x] Not at all  [] Several Days  [] More than half the day  []  Nearly every day    Total Score: 0    If you checked off any problems, how difficult have these problems made it for you to do your work, take care of things at home, or get along with other people?    [x] Not difficult at all  [] Somewhat Difficult  [] Very Difficult  []  Extremely Difficult

## 2022-01-06 NOTE — PROGRESS NOTES
Progress Note    1/6/2022 7:33 AM  Subjective: Interval History: Patient states she had a mucousy stool approximately 3 AM this morning. Is not a normal type a stool for her. She is still having the left facial dyskinesias, she sometimes feels some numbness and weird feeling in the left lower two thirds of the face and the left lateral neck around the sternocleidomastoid muscle. Patient states she does have a sensitive tooth in the left lower gum    Diet: ADULT DIET; Regular    Medications:   Reviewed medications    Labs:   CBC: Recent Labs     01/05/22  0711   WBC 6.0   HGB 9.7*        BMP:    Recent Labs     01/05/22  0711      K 3.9   CL 97*   CO2 27   BUN 9   CREATININE 0.63   GLUCOSE 94     INR: No results for input(s): INR in the last 72 hours. KUB: still ileus but improved. Objective:   Vitals: BP (!) 145/78   Pulse 89   Temp 98.2 °F (36.8 °C)   Resp 16   Ht 5' 1\" (1.549 m)   Wt 142 lb 6.4 oz (64.6 kg)   SpO2 99%   BMI 26.91 kg/m²   General appearance: alert and cooperative with exam  Neck: no adenopathy and thyroid not enlarged, symmetric, no tenderness/mass/nodules  Lungs: clear to auscultation bilaterally  Heart: regular rate and rhythm, S1, S2 normal, no murmur, click, rub or gallop  Abdomen: Abdomen still appears bloated. Positive bowel sounds, not tender. Extremities: extremities normal, atraumatic, no cyanosis or edema   Left facial muscles are still equal in movement to the right. The left cheek appears slightly swollen. It is not tender, not red and no increase in warmth. The left superior sternocleidomastoid muscle is also slightly swollen not really tender. Neurologic: Mental status: Alert, oriented, thought content appropriate    Assessment and Plan:    left facial numbness off and on. This could be from a bad tooth or possible early Bell's palsy or trigeminal nerve issues.   Abdomen bloating and constipation    P; patient has been on Urecholine which is not a normal medication for her. This could cause some bowel issues. Decrease the Urecholine to 3 times daily. Consider off this medication. Patient would rather get back on her normal pain medication which is Norco 7.5 mg. She uses this as needed in addition to her methadone. Monitor her left facial symptoms. Patient was encouraged to massage the the neck and the cheek.     Patient Active Problem List:     Cervical stenosis of spine     Essential hypertension     Hypothyroidism     Lumbar radiculopathy, chronic     Lumbar neuritis     Post laminectomy syndrome     Hypokalemia     Pain of right hip joint     Post-menopausal osteoporosis     Chronic gastritis without bleeding     Elevated CEA     Esophageal dysphagia     Hypotension due to drugs     Ulcerative esophagitis     Weight loss, abnormal     Cervical myelopathy (HCC)     Lumbar stenosis with neurogenic claudication     Spinal deformity     Anxiety about health     Cauda equina compression (HCC)     Anemia, normocytic normochromic     Iron deficiency     Pseudomonas urinary tract infection     Hypocalcemia     Cauda equina syndrome [facility])      Electronically signed by Consuelo Rinne, MD on 1/6/2022 at 7:33 AM

## 2022-01-06 NOTE — PROGRESS NOTES
[address]   OCCUPATIONAL THERAPY MISSED TREATMENT NOTE   ACUTE REHAB  Date: 22  Patient Name: Zoila Han       Room: 2765/0908-48  MRN: [id number]   Account #: [de-identified]    : 1960  (62 y.o.)  Gender: female   Diagnosis: Lumbar stenosis with neurogenic claudication, L2-S1 laminectomy on 21 for cauda equina syndrome (Dr. Freddie Perez), Cervical stenosis with myelopathy, decreased BLE strength , L weaker than R,             REASON FOR MISSED TREATMENT:  Patient refusal   -    Pt refusing OT at this time due to nausea, staff and pt reports just having emesis prior to OT arrival, pt states \"I don't want to move right now. .my stomach is really upset\", will attempt later this date, NSG made aware.        GOOD Paredes

## 2022-01-06 NOTE — PLAN OF CARE
Problem: Pain:  Goal: Pain level will decrease  Description: Pain level will decrease  1/6/2022 0126 by Mike Valdez LPN  Outcome: Ongoing  1/5/2022 1413 by Mehreen Jefferson RN  Outcome: Ongoing  Note: Patient's pain is well controlled with current regimen. See MAR. Goal: Control of acute pain  Description: Control of acute pain  1/6/2022 0126 by Mike Valdez LPN  Outcome: Ongoing  1/5/2022 1413 by Mehreen Jefferson RN  Outcome: Ongoing  Goal: Control of chronic pain  Description: Control of chronic pain  1/6/2022 0126 by Mike Valdez LPN  Outcome: Ongoing  1/5/2022 1413 by Mehreen Jefferson RN  Outcome: Ongoing     Problem: Falls - Risk of:  Goal: Will remain free from falls  Description: Will remain free from falls  1/6/2022 0126 by Mike Valdez LPN  Outcome: Ongoing  1/5/2022 1413 by Mehreen Jefferson RN  Outcome: Ongoing  Note: Patient remains free of falls and injuries throughout shift. Bed remains in the lowest position, wheels locked, call light and bedside table are within reach. Goal: Absence of physical injury  Description: Absence of physical injury  1/6/2022 0126 by Mike Valdez LPN  Outcome: Ongoing  1/5/2022 1413 by Mehreen Jefferson RN  Outcome: Ongoing     Problem: Skin Integrity:  Goal: Will show no infection signs and symptoms  Description: Will show no infection signs and symptoms  1/6/2022 0126 by Mike Valdez LPN  Outcome: Ongoing  1/5/2022 1413 by Mehreen Jefferson RN  Outcome: Ongoing  Note: No signs of increased skin or tissue breakdown is noted. See Head to Toe/LDA assessments in flowsheets.    Goal: Absence of new skin breakdown  Description: Absence of new skin breakdown  1/6/2022 0126 by Mike Valdez LPN  Outcome: Ongoing  1/5/2022 1413 by Mehreen Jefferson RN  Outcome: Ongoing     Problem: Musculor/Skeletal Functional Status  Goal: Highest potential functional level  1/6/2022 0126 by Mike Valdez LPN  Outcome: Ongoing  1/5/2022 1413 by Pippa English RN  Outcome: Ongoing  Goal: Absence of falls  1/6/2022 0126 by Juhi Bah LPN  Outcome: Ongoing  1/5/2022 1413 by Pippa English RN  Outcome: Ongoing     Problem: Musculor/Skeletal Functional Status  Goal: Highest potential functional level  Outcome: Ongoing  Goal: Absence of falls  Outcome: Ongoing     Problem: Nutrition  Goal: Optimal nutrition therapy  1/6/2022 0126 by Juhi aBh LPN  Outcome: Ongoing  1/5/2022 1707 by Guss Cranker Clunk, RD, LD  Outcome: Ongoing  1/5/2022 1707 by Guss Cranker Clunk, RD, LD  Outcome: Ongoing

## 2022-01-06 NOTE — PLAN OF CARE
Nutrition Problem #1: Inadequate oral intake  Intervention: Food and/or Nutrient Delivery: Continue Current Diet,Start Oral Nutrition Supplement  Nutritional Goals: PO intake % of meals with good tolerance

## 2022-01-06 NOTE — PLAN OF CARE
Problem: Pain:  Goal: Pain level will decrease  Description: Pain level will decrease  Outcome: Ongoing  Note: Patient's pain is well controlled with current regimen. See MAR. Problem: Falls - Risk of:  Goal: Will remain free from falls  Description: Will remain free from falls  Outcome: Ongoing  Note: Patient remains free of falls and injuries throughout shift. Bed remains in the lowest position, wheels locked, call light and bedside table are within reach. Problem: Skin Integrity:  Goal: Will show no infection signs and symptoms  Description: Will show no infection signs and symptoms  Outcome: Ongoing  Note: No signs of increased skin or tissue breakdown is noted. See Head to Toe/LDA assessments in flowsheets.       Problem: Musculor/Skeletal Functional Status  Goal: Highest potential functional level  Outcome: Ongoing     Problem: Nutrition  Goal: Optimal nutrition therapy  1/6/2022 1639 by Merced Zimmerman RN  Outcome: Ongoing

## 2022-01-06 NOTE — PROGRESS NOTES
Physical Medicine & Rehabilitation  Progress Note    1/6/2022 11:07 AM     CC: Ambulatory and ADL dysfunction due to status post L2-S1 laminectomy    Subjective:   Feels well. Some itching distal back incision. Some loose stools today. did have some emesis post MiraLAX due to the medication otherwise has no nausea or vomiting. No abdominal pain. Some numbness to the left face-no tiny chin area    ROS:  Denies fevers, chills, sweats. No chest pain, palpitations, lightheadedness. Denies coughing, wheezing or shortness of breath. Denies abdominal pain, nausea, diarrhea or constipation. No new areas of joint pain. Denies new areas of numbness or weakness. Denies new anxiety or depression issues. No new skin problems. Rehabilitation:   PT:  Restrictions/Precautions: General Precautions,Surgical Protocols,Fall Risk,Up as Tolerated  Spinal Precautions: lumbar spine surgery  Other position/activity restrictions: Activity as tolerated. per MD discharge papers pt OK to shower 3-4 days post surgery   Transfers  Sit to Stand: Minimal Assistance  Stand to sit: Minimal Assistance  Bed to Chair: Minimal assistance (Rolling walker)  Comment: Verbal cues for hand placement with good return. Ambulation 1  Surface: level tile  Device: Rolling Walker  Assistance: Contact guard assistance,Minimal assistance  Quality of Gait: mildly unsteady with turning, no significant LOB, ambulaltes with L knee flexed, decreased clearance noted for L foot. Pt with kyphotic posture and forward head posture. Gait Deviations: Slow Sharon,Shuffles,Decreased step height,Decreased step length  Distance: 70'x2  Comments: Pt reports her B UE gets fatique quickly. OT:  ADL  Feeding: Setup,Dentures (L hand coordination deficits impact 2 handed tasks like cut meat)  Grooming: Setup  UE Bathing: Setup  LE Bathing: Moderate assistance  UE Dressing: Setup,Stand by assistance (t shirt)  LE Dressing:  Moderate assistance  Toileting: Moderate assistance  Additional Comments: groom from w/c seated at sink,  brushed and soaked dentures,  w/c to toilet SPT, toileting, amb 8 feet into shower, stood to wash bottom, sat for remainder of shower, washed hair, SPT out of shower, completed dressing. is able to cross to reach RLE fair, needs assist to bring LLE into crossed position. wearing t shirt, pullups, pants, teds, slipper socks. step transfer to recliner for lunch. Balance  Sitting Balance: Independent (tub bench static and dynamic)  Standing Balance: Minimal assistance (mostly CGA- especially for static)   Standing Balance  Time: 3-5 min x 5, 7 min  Activity: adls  Comment: w/RW, unsteady with no gross LOB, can stand without UE support briefly  Functional Mobility  Functional - Mobility Device: Rolling Walker  Assist Level: Minimal assistance  Functional Mobility Comments: 8 feet during adls, attempt to allow w/c mobility indep but pt needs more ed, practice- is unfamiliar and at this time needing max assist -also due to poor work tolerance. Bed mobility  Rolling to Left: Contact guard assistance  Rolling to Right: Minimal assistance  Supine to Sit: Minimal assistance  Sit to Supine: Moderate assistance  Scooting: Minimal assistance  Comment: I/S in log rolling technique, pt reaches for bed rail to assist with rolling. Transfers  Stand Step Transfers: Minimal assistance  Stand Pivot Transfers: Minimal assistance  Sit to stand: Contact guard assistance  Stand to sit: Contact guard assistance  Transfer Comments: w/RW   Toilet Transfers  Toilet - Technique: Stand pivot  Equipment Used: Grab bars  Toilet Transfer: Minimal assistance  Toilet Transfers Comments: RW     Shower Transfers  Shower - Transfer From: Wheelchair  Shower - Transfer Type: To and From  Shower - Transfer To:  Transfer tub bench  Shower Transfers: Minimal assistance         ST:           Objective:  BP (!) 145/78   Pulse 89   Temp 98.2 °F (36.8 °C)   Resp 16 Ht 5' 1\" (1.549 m)   Wt 142 lb 6.4 oz (64.6 kg)   SpO2 99%   BMI 26.91 kg/m²  I Body mass index is 26.91 kg/m². I   Wt Readings from Last 1 Encounters:   22 142 lb 6.4 oz (64.6 kg)      Temp (24hrs), Av.4 °F (36.9 °C), Min:98.2 °F (36.8 °C), Max:98.6 °F (37 °C)         GEN: well developed, well nourished, no acute distress  HEENT: Normocephalic atraumatic, EOMI, mucous membranes pink and moist, face symmetric, no numbness on examination sensation intact to light touch and pinprick, no facial droop, no visual deficit, hearing intact, no ptosis  CV: RRR, no murmurs, rubs or gallops  PULM: CTAB, no rales or rhonchi. Respirations WNL and unlabored  ABD: Softer abdomen, positive bowel sounds, nontender, still mildly distended  NEURO: A&O x3. Sensation intact to light touch. MSK: 4+/5 upper lower extremity  EXTREMITIES: No calf tenderness to palpation bilaterally. No edema BLEs  SKIN: warm dry and intact with good turgor, posterior laminectomy incision-increased erythema distally  PSYCH: appropriately interactive. Affect WNL. Medications   Scheduled Meds:   bethanechol  25 mg Oral TID    ceFAZolin  2,000 mg IntraVENous Q8H    bisacodyl  10 mg Rectal Daily    enoxaparin  40 mg SubCUTAneous Daily    baclofen  10 mg Oral TID    busPIRone  30 mg Oral BID    ciprofloxacin  500 mg Oral 2 times per day    dilTIAZem  180 mg Oral Daily    [Held by provider] ferrous sulfate  325 mg Oral BID WC    levothyroxine  88 mcg Oral Daily    methadone  10 mg Oral BID    pantoprazole  40 mg Oral Daily    sennosides-docusate sodium  2 tablet Oral Daily    polyethylene glycol  17 g Oral Daily     Continuous Infusions:  PRN Meds:. HYDROcodone-acetaminophen, ondansetron **OR** [DISCONTINUED] ondansetron, magnesium hydroxide, acetaminophen, senna, bisacodyl     Diagnostics:     CBC:   Recent Labs     22  1015 22  0711   WBC 9.9 6.0   RBC 2.96* 2.95*   HGB 9.7* 9.7*   HCT 30.0* 28.5*   .4 96.6 RDW 12.5 13.3    306     BMP:   Recent Labs     01/05/22  0711      K 3.9   CL 97*   CO2 27   BUN 9   CREATININE 0.63     BNP: No results for input(s): BNP in the last 72 hours. PT/INR: No results for input(s): PROTIME, INR in the last 72 hours. APTT: No results for input(s): APTT in the last 72 hours. CARDIAC ENZYMES: No results for input(s): CKMB, CKMBINDEX, TROPONINT in the last 72 hours. Invalid input(s): CKTOTAL;3  FASTING LIPID PANEL:  Lab Results   Component Value Date    CHOL 198 07/23/2020     (A) 07/23/2020    TRIG 96 07/23/2020     LIVER PROFILE: No results for input(s): AST, ALT, ALB, BILIDIR, BILITOT, ALKPHOS in the last 72 hours. I/O (24Hr): Intake/Output Summary (Last 24 hours) at 1/6/2022 1107  Last data filed at 1/5/2022 2125  Gross per 24 hour   Intake 480 ml   Output --   Net 480 ml       Glu last 24 hour  No results for input(s): POCGLU in the last 72 hours. No results for input(s): CLARITYU, COLORU, PHUR, SPECGRAV, PROTEINU, RBCUA, BLOODU, BACTERIA, NITRU, WBCUA, LEUKOCYTESUR, YEAST, GLUCOSEU, BILIRUBINUR in the last 72 hours. KUB 1/5/2022  Impression:        Gas-filled loops of small large bowel are identified with improving appearing   ileus or less likely partial obstruction.              Other Diagnoses/plan:     1. Ambulatory and ADL dysfunction due to cauda equina status post lumbar laminectomy- continue rehab efforts, team conference today discharge plan 1/14  2. Spinal stenosis-monitor incision, some increased erythema noted from yesterday, perfect serve picture to Dr. Derek Linn of NS- wants patient on IV Ancef 2 g every 8 hours for 6 doses, aware patient is on Cipro. He will stop by to see the patient tomorrow. Patient denies any allergies to penicillin/Ancef, etc. reviewed with patient  3.  -UTI -had urinary retention improved with bowel movement-monitor check PVR and follow-up bladder protocol-PVRs 200-250 per nursing-continue to monitor, on Cipro through 1/13?  (Urine culture 1/1 sensitive to Cipro) per internal medicine at [facility],, noted Urecholine decreased to 3 times daily by family practice  4. Neurogenic bowel/GI/constipation/ileus-abdominal film showing gas-filled loops with improving appearing ileus or less likely partial obstruction- appreciate general surgery eval/follow- Daily Dulcolax with rectal stimulation, enemas as needed ordered, change Senokot to 2 tablets nightly and Colace twice daily, consider Relistor-defer to family practice, ensure bladder emptying cath greater than 350, minimize narcotics  5. GERD-Protonix  6. Spasticity-baclofen  7. Chronic pain/history of lumbar surgeries and cervical spine surgery/postlaminectomy syndrome-on methadone-follows with Dr. Perla Rivas- will follow up on discharge, currently on methadone 10 twice daily home dose changed to Norco every 8 hours. By family practice-advised patient to minimize  8. Hypertension-diltiazem  9. Depression-BuSpar  10. Hypothyroid-Synthroid  11. Anemia-on iron- hold due to constipation, hemoglobin 9.7  12. DVT prophylaxis-Lovenox  13. Family practice-Dr. Saira Huggins for medical management, appreciate medical management         Osgeorge Hyde. Stephie Lemons MD       This note is created with the assistance of a speech recognition program.  While intending to generate a document that actually reflects the content of the visit, the document can still have some errors including those of syntax and sound a like substitutions which may escape proof reading.   In such instances, actual meaning can be extrapolated by contextual diversion

## 2022-01-06 NOTE — CARE COORDINATION
CASE MANAGEMENT NOTE:    Admission Date:  1/4/2022 Mary Fermin is a 62 y.o.  female    Admitted for : Cauda equina syndrome ([facility] [address] [G83.4]    Met with:  Patient    PCP: Dr. Toby Cabrera:  Ren Mathew     Is patient alert and oriented at time of discussion:  Yes    Current Residence/ Living Arrangements:  independently at home             Current Services PTA:  No    Does patient go to outpatient dialysis: No  If yes, location and chair time: n/a    Is patient agreeable to VNS: Yes    Freedom of choice provided:  Yes    List of [address] provided: No    VNS chosen:  No    DME:  Walkers with seat and rolling walker at home Might want shower seat but has a glass shower door. Home Oxygen: No    Nebulizer: No    CPAP/BIPAP: No    Supplier: N/A    Potential Assistance Needed: Yes    SNF needed: No    Freedom of choice and list provided: Yes    Pharmacy:  [facility]       Does Patient want to use MEDS to BEDS? No    Is patient currently receiving oral anticoagulation therapy? NA    Is the Patient an [facility] with Readmission Risk Score greater than 14%? Yes  If yes, pt needs a follow up appointment made within 7 days. Family Members/Caregivers that pt would like involved in their care:    Yes    If yes, list name here:  Partner Breana Fagan lives with pt and helps her in shower can take off work when needed. Transportation Provider:  Family             Discharge Plan:  The Plan for Transition of Care is related to the following treatment goals: pt would like to return to live with partner Breana Fagan who helps her already at home. He works but has flexible schedule. Her daughter and 4 grandkids live in [place] [place] [facility] [place]. They ae close. Pt may want home care  At discharge cell: [phone] for pt. Mahsa S. Anna Roblero.   The Patient and/or patient representative patient was provided with a choice of provider and agrees   with the discharge plan.  [x] Yes [] No    Freedom of choice list was provided with basic dialogue that supports the patient's individualized plan of care/goals, treatment preferences and shares the quality data associated with the providers.  [x] Yes [] No                 Electronically signed by: ROMELIA Vang, JONELW on 1/6/2022 at 3:39 PM

## 2022-01-06 NOTE — PROGRESS NOTES
PROGRESS NOTE      PATIENT NAME: Sha Abebe RECORD NO. [id number]  DATE: 1/6/2022  SURGEON: Allan Mckenna  PRIMARY CARE PHYSICIAN: Kimani Zhou MD    HD: # 2    ASSESSMENT    Patient Active Problem List   Diagnosis    Cervical stenosis of spine    Essential hypertension    Hypothyroidism    Lumbar radiculopathy, chronic    Lumbar neuritis    Post laminectomy syndrome    Hypokalemia    Pain of right hip joint    Post-menopausal osteoporosis    Chronic gastritis without bleeding    Elevated CEA    Esophageal dysphagia    Hypotension due to drugs    Ulcerative esophagitis    Weight loss, abnormal    Cervical myelopathy (HCC)    Lumbar stenosis with neurogenic claudication    Spinal deformity    Anxiety about health    Cauda equina compression (HCC)    Anemia, normocytic normochromic    Iron deficiency    Pseudomonas urinary tract infection    Hypocalcemia    Cauda equina syndrome [facility])       MEDICAL DECISION MAKING AND PLAN    S/p laminectomy for cauda equina  Neurogenic bladder  Neurogenic bowel  Ileus post op  Constipation- chronic OP, issue inpt  Use of chronic narcotics- methadone  Use of acute narcotics- dose increased 1/6              Aggressive bowel regimen    She is declining miralax and took one dulcolax suppository      yesterday          Had 1 bm small this am       Still with distention but able to participate in therapy            .                           Scheduled bowel meds               Ensure bladder is emptyingcontinue bladder scanning and straight                            cath >350ml    PVR 200s per chart               Narcotic dose increased yesterday                  Recommendations:   Minimize narcotics, decrease to home regimen    Consider non narcotic medication   Enema    Consider Start relistor as appropriate     Daily dulcolax with rectal stimulation                          Goal is 1-2 BMs a day- use enemas etc as needed    No surgical intervention indicated. Aggressive bowel management. Surgery will be available as needed, please call back prn      SUBJECTIVE    Katelynn Kidney is about the same. In bathroom this am, up with therapy, doesn't tolerate miralax, declined suppository this am but took one yesterday      OBJECTIVE  VITALS: Temp: Temp: 98.2 °F (36.8 °C)Temp  Av.3 °F (36.8 °C)  Min: 98.2 °F (36.8 °C)  Max: 98.6 °F (37 °C) BP Systolic (36YNT), KBI:843 , Min:120 , FRQ:061   Diastolic (48OGY), OFB:14, Min:62, Max:83   Pulse Pulse  Av.7  Min: 89  Max: 99 Resp Resp  Av.3  Min: 16  Max: 18 Pulse ox SpO2  Av.7 %  Min: 96 %  Max: 99 %    Physical Exam  Constitutional:       Appearance: Normal appearance. She is obese. HENT:      Head: Normocephalic. Mouth/Throat:      Pharynx: Oropharynx is clear. Eyes:      Extraocular Movements: Extraocular movements intact. Cardiovascular:      Pulses: Normal pulses. Pulmonary:      Effort: Pulmonary effort is normal.   Musculoskeletal:      Comments: Weakness L>R LE   Neurological:      Mental Status: She is alert. I/O last 3 completed shifts: In: 480 [P.O.:480]  Out: 556 [Urine:556]    Drain/tube output: In: 480 [P.O.:480]  Out: -     LAB:  CBC:   Recent Labs     22  1015 22  0711   WBC 9.9 6.0   HGB 9.7* 9.7*   HCT 30.0* 28.5*   .4 96.6    306     BMP:   Recent Labs     22  0711      K 3.9   CL 97*   CO2 27   BUN 9   CREATININE 0.63   GLUCOSE 94     COAGS: No results for input(s): APTT, PROT, INR in the last 72 hours.           PRAVIN INMAN, APRN - CNP  22, 7:05 AM

## 2022-01-06 NOTE — PROGRESS NOTES
[address] [facility]    ACUTE REHABILITATION OCCUPATIONAL THERAPY  DAILY NOTE    Date: 22  Patient Name: Ann Eason      Room: 2502/3775-59    MRN: [id number]   : 1960  (62 y.o.)  Gender: female      Diagnosis: Lumbar stenosis with neurogenic claudication, L2-S1 laminectomy on 21 for cauda equina syndrome (Dr. Yarbrough Speaks), Cervical stenosis with myelopathy, decreased BLE strength , L weaker than R,  Additional Pertinent Hx: worsening function over past 2 months - has needed more assist with advanced adls. wears pullups at home due to urgency issues. needs cervical spine surgery when able post this surgery. Left facial symptoms: could be early bells palsy    Restrictions  Restrictions/Precautions: General Precautions,Surgical Protocols,Fall Risk,Up as Tolerated  Spinal Precautions: lumbar spine surgery  Other position/activity restrictions: Activity as tolerated. per MD discharge papers pt OK to shower 3-4 days post surgery  Required Braces or Orthoses?: No    Subjective  Subjective: \"I feel a little better but 100%\"  Comments: pt reports minimal nausea and abdominal discomfort but feeling better than this morning  Patient Currently in Pain: Yes  Pain Level: 5  Pain Location: Back  Pain Orientation: Mid;Lower  Restrictions/Precautions: General Precautions;Surgical Protocols; Fall Risk;Up as Tolerated     Patient Observation  Observations: good tolerance for tasks this afternoon  Pain Assessment  Pain Assessment: 0-10  Pain Level: 5  Pain Type: Chronic pain  Pain Location: Back  Pain Orientation: Mid,Lower  Pain Descriptors: Sore,Aching    Objective  Cognition  Overall Cognitive Status: WFL  Perception  Overall Perceptual Status: WFL  Balance  Sitting Balance: Modified independent  (seated in w/c)  Standing Balance: Contact guard assistance (1-2 UE support req)  Transfers  Stand Step Transfers: Contact guard assistance  Sit to stand: Contact guard assistance  Stand to sit: Contact guard assistance  Transfer Comments: good hand placements noted  Standing Balance  Time: PM: 3-4 min  Activity: PM: dynamic standing for gentle reaching activity, crossing midline as tolerated to challenge balance, 1-2 UE support req with no LOB noted, CGA req  Comment: no LOB noted        Type of ROM/Therapeutic Exercise  Type of ROM/Therapeutic Exercise: Free weights (1#, BUE's 15 reps)  Comment: pt engaged in BUE ex's to support mobility/transfers and overall endurance, rest breaks req as needed, good tolerance noted  Exercises  Scapular Protraction: x  Scapular Retraction: x  Shoulder Flexion: x  Shoulder Extension: x  Shoulder ABduction: x  Shoulder ADduction: x  Horizontal ABduction: x  Horizontal ADduction: x  Elbow Flexion: x  Elbow Extension: x     Additional Activities: PM: pt completed lacing ADL board and grooved peg board to address bimanual hand coordination, FM skills and digit manipulation, increased time req for both tasks but did well to complete with no assist despite coordination impairments                 ADL  Additional Comments: declines all ADL tasks this date          Assessment  Performance deficits / Impairments: Decreased functional mobility ; Decreased ADL status; Decreased endurance;Decreased high-level IADLs;Decreased balance;Decreased coordination;Decreased sensation;Decreased posture;Decreased ROM; Decreased strength;Decreased fine motor control  Prognosis: Good  Discharge Recommendations: Patient would benefit from continued therapy after discharge  Activity Tolerance: Patient Tolerated treatment well;Patient limited by pain  Safety Devices in place: Yes  Type of devices: Left in chair (taken to PT)  Equipment Recommendations  Equipment Needed:  (TBD)       Patient Education: strengthening, BUE coordination, activity promotion, standing tolerance   Patient Goals   Patient goals : walk safely to be home alone while spouse at work  Learner:patient  Method: demonstration and explanation Outcome: demonstrated understanding        Plan  Plan  Times per week: 5-7  Times per day: Twice a day  Current Treatment Recommendations: Strengthening,Patient/Caregiver Education & Training,Home Management Training,Equipment Evaluation, Education, & procurement,Balance Training,Functional Mobility Training,Endurance Training,Safety Education & Training,Self-Care / ADL  Patient Goals   Patient goals : walk safely to be home alone while spouse at work  Short term goals  Time Frame for Short term goals: 1 week  Short term goal 1: min assist LE self care  Short term goal 2: CGA amb to obtain set up for adls with good walker technique  Short term goal 3: joseph 8-9 min stand , ambulate for adls with RW and CGA  Short term goal 4: joseph 10 reps x 2 B shld ex with 1 lb weight on L and 2 lb weight on R  and 20 reps x 2 elbow ex with 2 lb wt on L and 3 lb on R for work joseph and UE strengthening. Short term goal 5: joseph repetitive  ex LUE with mod resistance for hand strengthening. Long term goals  Time Frame for Long term goals : by discharge  Long term goal 1: mod indep self care  Long term goal 2: mod indep amb to obtain set up for adls with good walker safety  Long term goal 3: joseph 11-13 min stand mod indep during adls  Long term goal 4: mod assist with transfer to tub to use tub seat vs remove shower doors for extended tub bench transfer with min assist.  Long term goal 5: mod indep with simple advanced adls with good walker safety and good balance. Long term goals 6: indep with BUE HEP for strengthening and coordination. Long term goal 7: increase L hand  strength to 40 pounds.         01/06/22 1501   OT Individual Minutes   Time In 1332   Time Out 1400   Minutes 28   Minute Variance   Variance 62   Reason Refusal  (N/V this morning)     Electronically signed by GOOD Castro on 1/6/22 at 3:02 PM EST

## 2022-01-06 NOTE — PROGRESS NOTES
Physical Therapy  [address]   Acute Rehabilitation Physical Therapy Progress Note    Date: 22  Patient Name: Merle Vargas       Room: 9540/7470-27  MRN: [id number]   Account: [de-identified]   : 1960  (62 y.o.) Gender: female        Diagnosis: Lumbar stenosis with neurogenic claudication, L2-S1 laminectomy on 21 for cauda equina syndrome (Dr. Sumi Guadarrama), Cervical stenosis with myelopathy, decreased BLE strength , L weaker than R,  Past Medical History:  has a past medical history of Anxiety, Arthritis, At maximum risk for fall, Cancer ([address] Cauda equina syndrome ([address] Cervical stenosis of spine, GERD (gastroesophageal reflux disease), History of stomach ulcers, Hypertension, Hypothyroidism, Lumbar neuritis, Post laminectomy syndrome, Spinal deformity, and Thyroid disease. Past Surgical History:   has a past surgical history that includes back surgery; hernia repair (Bilateral); Breast surgery (Right); cervical laminectomy (2014); Mastectomy, radical; Hysterectomy, total abdominal; and Lumbar spine surgery (N/A, 2021). Additional Pertinent Hx:   Patient presents with decline in her mobility, LE weakness over the course of the last few months where she can barely even ambulate with the walker. Pt admitted to [facility] and underwent  lumbar laminectomy L2-S1 for cauda equina decompression on 22. Pt presents with B LE weakness, L LE > R LE. Pt admitted to rehab unit on 22. Overall Orientation Status: Within Functional Limits  Restrictions/Precautions  Restrictions/Precautions: General Precautions;Surgical Protocols; Fall Risk;Up as Tolerated  Required Braces or Orthoses?: No  Position Activity Restriction  Spinal Precautions: lumbar spine surgery  Other position/activity restrictions: Activity as tolerated. per MD discharge papers pt OK to shower 3-4 days post surgery    Subjective: Pt is pleasant and agreeable to PT despite fatigue.  PM: Pt reports increase fatigue, however agreeable to PT. Vital Signs  Patient Currently in Pain: Denies  Response to Pain Intervention: Patient Satisfied     Oxygen Therapy  O2 Device: None (Room air)          Bed Mobility:   Bridging: Minimal assistance  Supine to Sit: Minimal assistance  Sit to Supine: Minimal assistance  Scooting: Minimal assistance  Comment: PT mat, wedge, 2 pillows. PT reports she sleeps in a recliner at home. Difficulty noted with advancing LLE. Transfers:  Sit to Stand: Minimal Assistance  Stand to sit: Minimal Assistance  Bed to Chair: Minimal assistance (Rolling walker)   Comments: Verbal cues for hand placement with good return. Ambulation 1  Surface: level tile  Device: Rolling Walker  Assistance: Contact guard assistance;Minimal assistance  Quality of Gait: mildly unsteady with turning, no significant LOB, ambulaltes with L knee flexed, decreased clearance noted for L foot. Pt with kyphotic posture and forward head posture. Cues to increase step length with good- carryover. \"Oh this help to decrease my pain\"  Gait Deviations: Slow Sharon; Shuffles;Decreased step height;Decreased step length  Distance: 70'x2        Stairs/Curb  Stairs?: Yes  Stairs  # Steps : 5 (x2)  Stairs Height: 4\" (6\")  Rails: Bilateral  Device: No Device  Assistance: Minimal assistance  Comment: Decrease LLE foot clearance. Pt instructed in proper sequencing/technique to ease stair training. Good carryover noted. PT requires a rest break during/after stair training. Wheelchair Activities  Propulsion: Yes  Propulsion 1  Propulsion: Manual  Level: Level Tile  Method: RUE;LUE  Level of Assistance: Stand by assistance;Contact guard assistance  Description/ Details: Edu provided on w/c management (brakes, leg rest, and proper hand placement on rim of wheel). Pt demos good carryover, however would benefit from additional practice. Pt demos min difficulty maintaining a straight path.  Pt is able to complete a 90 degree turn. Distance: 50'x1                        BALANCE Posture: Fair  Sitting - Static: Good  Sitting - Dynamic: Fair;+  Standing - Static: Fair  Standing - Dynamic: Fair;-  Comments: standing balance assessed while using a RW    EXERCISES    Other exercises?: Yes  Other exercises 1: Seated B LE ex's 15 reps, Lime T band ex's   Other exercises 3: Nustep L 4 at 5min. Seat at 8 and arms at 9. Other exercises 4: Supine BLE ex's AROM only. Reps: 15. Rest breaks between ex's. Increase time required due to fatigue. Other exercises 5: STS x5  Other exercises 6: Stand pivots x4  Other Activities  Comment: rest breaks PRN. Activity Tolerance: Patient limited by endurance,Patient Tolerated treatment well  PT Equipment Recommendations  Equipment Needed: No       Patient Education  New Education Provided:    Learner:patient  Method: demonstration and explanation       Outcome: needs reinforcement     Current Treatment Recommendations: Strengthening,ROM,Stair training,Balance Training,Gait Training,Patient/Caregiver Education & Training,Equipment Evaluation, Education, & procurement,Neuromuscular Re-education,Functional Mobility Training,Endurance Training,Transfer Training,Safety Education & Training,Home Exercise Program    Conditions Requiring Skilled Therapeutic Intervention  Body structures, Functions, Activity limitations: Decreased functional mobility ; Decreased balance;Decreased endurance;Decreased strength; Increased pain;Decreased sensation  Assessment: Pt required Miryam for bed mobility for BLE progression and trunk stability. Pt ambulated 20 to 40 ft with CGA/min A using a RW and required increased time and effort to complete due to slow christine. Pt is limited by decreased BLE strength and would benefit from continued PT following discharge to address functional deficits.   Treatment Diagnosis: Impaired fucntion  Prognosis: Good  Decision Making: Medium Complexity  REQUIRES PT FOLLOW UP: Yes  Discharge Recommendations: Patient would benefit from continued therapy after discharge;Home with assist PRN    Goals  Short term goals  Time Frame for Short term goals: 1 week  Short term goal 1: Pt will ambulate 200 feet with a RW and SBA to increase functional independence. Short term goal 2: Pt able to perform bed mobility at SBA  Short term goal 3: Pt will demonstrate good- standing balance with rolling walker to decrease fall risk. Short term goal 4: Pt will perform sit<>stand and pivot transfer with rolling walker SBA to increase functional independence. Short term goal 5: Pt will negotiate 5 stair with 2 handrail and CGA to allow the pt to enter prior living arrangements. Long term goals  Time Frame for Long term goals : By DC  Long term goal 1: Pt able to perform bed mobility mod-I  Long term goal 2: Pt able to transfer at mod-I  Long term goal 3: Pt able to ambulate with rolling walker distance of 150 ft, mod-I, level surfaces  Long term goal 4:  Pt able to ambulate on incline surface at SBA with rolling walker. Long term goal 5: Pt able to get up and down curb steps with UE support, CGA  Long term goal 6: Improve 2MWT distance to 100 ft to improve gait speed and overall fucntion. Long term goal 7: Pt able to go up and 12 steps with 2 UE support at SBA to improve B LE strength.        01/06/22 0901 01/06/22 1401   PT Individual Minutes   Time In 0901 1401   Time Out 1001 1432   Minutes 60 31       Electronically signed by Juli Bashir PTA on 1/6/22 at 3:23 PM EST

## 2022-01-06 NOTE — PLAN OF CARE
Individualized Plan of [facility] Inpatient Rehabilitation Unit    Rehabilitation physician: Dr. Chloe Carreon Date: 1/4/2022     Rehabilitation Diagnosis: Cauda equina syndrome ([facility] [address] [G83.4]     Rehabilitation impairments: self care, mobility, motor dysfunction, bowel/bladder management, pain management and safety    Factors facilitating achievement of predicted outcomes: Family support, Motivated, Cooperative, Pleasant and Good insight into deficits  Barriers to the achievement of predicted outcomes: Pain, Anxiety, Decreased endurance, Lower extremity weakness, Skin Care, Wound Care and Medication managment    Patient Goals: Improve independence with mobility, Improvement of mobility at a wheelchair level, Increase overall strength and endurance, Increase balance, Increase endurance, Increase independence with activities of daily living, Improve cognition, Increase self-awareness, Increase safety awareness, Increase community integration, Increase socialization, Functional communication with caregivers, Integrate appropriate pain management plan, Assure adequate nutritional option for discharge, Continence of bowel and bladder and Provide appropriate patient and family education      NURSING:  Nursing goals for Brianna Muniz while on the rehabilitation unit will include:  Continence of bowel and bladder, Adequate number of bowel movements, Urinate with no urinary retention >300ml in bladder, Complete bladder protocol with mario removal, Maintain O2 SATs at an acceptable level during stay, Effective pain management while on the rehabilitation unit, Establish adequate pain control plan for discharge, Absence of skin breakdown while on the rehabilitation unit, Improved skin integrity via assessments including wound measurements, Avoidance of any hospital acquired infections, No signs/symptoms of infection at the wound site, Freedom from injury during hospitalization and Complete education with patient/family with understanding demonstrated regarding disease process and resultant impairment     In order to achieve these goals, nursing interventions may include bowel/bladder training, education for medical assistive devices, medication education, O2 saturation management, energy conservation, stress management techniques, fall prevention, alarms protocol, seating and positioning, skin/wound care, pressure relief instruction, dressing changes, infection protection, DVT prophylaxis, assistance with safe transfers , and/or assistance with bathroom activities and hygiene. PHYSICAL THERAPY:  Goals:        Short term goals  Time Frame for Short term goals: 1 week  Short term goal 1: Pt will ambulate 200 feet with a RW and SBA to increase functional independence. Short term goal 2: Pt able to perform bed mobility at SBA  Short term goal 3: Pt will demonstrate good- standing balance with rolling walker to decrease fall risk. Short term goal 4: Pt will perform sit<>stand and pivot transfer with rolling walker SBA to increase functional independence. Short term goal 5: Pt will negotiate 5 stair with 2 handrail and CGA to allow the pt to enter prior living arrangements. Long term goals  Time Frame for Long term goals : By DC  Long term goal 1: Pt able to perform bed mobility mod-I  Long term goal 2: Pt able to transfer at mod-I  Long term goal 3: Pt able to ambulate with rolling walker distance of 150 ft, mod-I, level surfaces  Long term goal 4:  Pt able to ambulate on incline surface at SBA with rolling walker. Long term goal 5: Pt able to get up and down curb steps with UE support, CGA  Long term goal 6: Improve 2MWT distance to 100 ft to improve gait speed and overall fucntion. Long term goal 7: Pt able to go up and 12 steps with 2 UE support at SBA to improve B LE strength.     Plan of Care: Pt to be seen by physical therapy services 1 Hour 30 Minutes per day at least 5 out of 7 days per week     Anticipated interventions may include therapeutic exercises, gait training, neuromuscular re-ed, transfer training, community reintegration, bed mobility, w/c mobility and training. OCCUPATIONAL THERAPY:  Goals:             Short term goals  Time Frame for Short term goals: 1 week  Short term goal 1: min assist LE self care  Short term goal 2: CGA amb to obtain set up for adls with good walker technique  Short term goal 3: joseph 8-9 min stand , ambulate for adls with RW and CGA  Short term goal 4: joseph 10 reps x 2 B shld ex with 1 lb weight on L and 2 lb weight on R  and 20 reps x 2 elbow ex with 2 lb wt on L and 3 lb on R for work joseph and UE strengthening. Short term goal 5: joseph repetitive  ex LUE with mod resistance for hand strengthening. Long term goals  Time Frame for Long term goals : by discharge  Long term goal 1: mod indep self care  Long term goal 2: mod indep amb to obtain set up for adls with good walker safety  Long term goal 3: joseph 11-13 min stand mod indep during adls  Long term goal 4: mod assist with transfer to tub to use tub seat vs remove shower doors for extended tub bench transfer with min assist.  Long term goal 5: mod indep with simple advanced adls with good walker safety and good balance. Long term goals 6: indep with BUE HEP for strengthening and coordination. Long term goal 7: increase L hand  strength to 40 pounds. Plan of Care: Patient to be seen by occupational therapy services 1 Hour 30 Minutes per day at least 5 out of 7 days per week     Anticipated interventions may include ADL and IADL retraining, strengthening, safety education and training, patient/caregiver education and training, equipment evaluation/ training/procurement, neuromuscular reeducation, wheelchair mobility training.       SPEECH THERAPY:   Goals:  N/A      CASE MANAGEMENT:  Goals:   Assist patient/family with discharge planning, patient/family counseling,  and coordination with insurance during the inpatient rehabilitation stay. Other members of the multidisciplinary rehabilitation team that will be involved in the patient's plan of care include recreational therapy, dietary, respiratory therapy, and neuropsychology. Medical issues being managed closely and that require 24 hour availability of a physician:  Bowel/Bladder function, Weight bearing precautions, Wound care, Pain management, Infection protection, DVT prophylaxis, Fall precautions, Fluid/Electrolyte balance, Nutritional status, Respiratory needs, Anemia and History of heart disease                                           Physician anticipated functional outcomes: Improved independence with functional measures   Estimated length of stay for this admission 10 days  Medical Prognosis: Fair  Anticipated disposition: Home. The potential to achieve the above medical and rehabilitative goals is good. This plan of care has been developed with the assistance and input of the multidisciplinary rehabilitation team.  The plan was reviewed with the patient on 1/6/2022. The patient has had the opportunity to provide input to the therapy team.    I have reviewed this Individualized Plan of Care and agree with its contents. Above documentation has been expanded, modified, adjusted to reflect the findings of my evaluations and goals for the patient. Physician:  Electronically signed by Trista Mena MD on 1/6/22 at 11:27 AM EST

## 2022-01-06 NOTE — PROGRESS NOTES
Comprehensive Nutrition Assessment    Type and Reason for Visit:  Reassess    Nutrition Recommendations/Plan: Continue current diet as tolerated. Add Ensure Enlive twice daily. Nutrition Assessment:  Pt continues to have issues with bowel regularity. States her abdomen is distended and it contributes to decreased appetite. Pt vomited earlier today and has had nausea. Pt is going to try to eat some of lunch and is willing to receive Ensure. Malnutrition Assessment:  Malnutrition Status: At risk for malnutrition (Comment)    Context:  Acute Illness     Findings of the 6 clinical characteristics of malnutrition:  Energy Intake:  1 - 75% or less of estimated energy requirements for 7 or more days  Weight Loss:  Unable to assess     Body Fat Loss:  Unable to assess     Muscle Mass Loss:  Unable to assess    Fluid Accumulation:  No significant fluid accumulation Extremities   Strength:  Not Performed    Estimated Daily Nutrient Needs:  Energy (kcal):  1330-0525 based on [employer] with 1.201.3 factor; Weight Used for Energy Requirements:  Admission     Protein (g):  67-76 based on 1.4-1.6 gm per kg; Weight Used for Protein Requirements:  Ideal          Nutrition Related Findings:  Distended abdomen, bloating, diarrhea (likely due to meds)- probable recent ileus. Nausea, vomiting at times. Meds and labs reviewed. Wounds:  Surgical Incision       Current Nutrition Therapies:    ADULT DIET; Regular  ADULT ORAL NUTRITION SUPPLEMENT; Lunch, Dinner; Standard High Calorie/High Protein Oral Supplement    Anthropometric Measures:  · Height: 5' 1\" (154.9 cm)  · Current Body Weight: 142 lb 6.7 oz (64.6 kg)   · Admission Body Weight: 142 lb 6.7 oz (64.6 kg)    · Usual Body Weight: 120 lb (54.4 kg) (per pt; unsure of time frame)     · Ideal Body Weight: 105 lbs; % Ideal Body Weight 135.6 %   · BMI: 26.9  · BMI Categories: Overweight (BMI 25.0-29. 9)       Nutrition Diagnosis:   · Inadequate oral intake related to altered GI function as evidenced by intake 0-25%,intake 26-50%    Nutrition Interventions:   Food and/or Nutrient Delivery:  Continue Current Diet,Start Oral Nutrition Supplement  Nutrition Education/Counseling:   (Briefly discussed dietary fiber adjusments, supplements, improtance of liquids.)   Coordination of Nutrition Care:  Continue to monitor while inpatient    Goals:  PO intake % of meals with good tolerance       Nutrition Monitoring and Evaluation:   Behavioral-Environmental Outcomes:  None Identified   Food/Nutrient Intake Outcomes:  Food and Nutrient Intake  Physical Signs/Symptoms Outcomes:  Biochemical Data,Constipation,Diarrhea,GI Status,Weight     Discharge Planning:    Continue current diet (As tolerated)     Some areas of assessment may be incomplete due to standard COVID-19 Precautions. Eugenia Lagos R.D., L.D.   Phone: [phone]

## 2022-01-07 PROCEDURE — 97116 GAIT TRAINING THERAPY: CPT

## 2022-01-07 PROCEDURE — 97110 THERAPEUTIC EXERCISES: CPT

## 2022-01-07 PROCEDURE — 6370000000 HC RX 637 (ALT 250 FOR IP): Performed by: FAMILY MEDICINE

## 2022-01-07 PROCEDURE — 1180000000 HC REHAB R&B

## 2022-01-07 PROCEDURE — 99232 SBSQ HOSP IP/OBS MODERATE 35: CPT | Performed by: PHYSICAL MEDICINE & REHABILITATION

## 2022-01-07 PROCEDURE — 6370000000 HC RX 637 (ALT 250 FOR IP): Performed by: NURSE PRACTITIONER

## 2022-01-07 PROCEDURE — 99233 SBSQ HOSP IP/OBS HIGH 50: CPT | Performed by: FAMILY MEDICINE

## 2022-01-07 PROCEDURE — 97530 THERAPEUTIC ACTIVITIES: CPT

## 2022-01-07 PROCEDURE — 6360000002 HC RX W HCPCS: Performed by: NURSE PRACTITIONER

## 2022-01-07 PROCEDURE — 6370000000 HC RX 637 (ALT 250 FOR IP): Performed by: PHYSICAL MEDICINE & REHABILITATION

## 2022-01-07 PROCEDURE — 6360000002 HC RX W HCPCS: Performed by: PHYSICAL MEDICINE & REHABILITATION

## 2022-01-07 PROCEDURE — 97535 SELF CARE MNGMENT TRAINING: CPT

## 2022-01-07 RX ORDER — SENNA PLUS 8.6 MG/1
2 TABLET ORAL 2 TIMES DAILY
Status: DISCONTINUED | OUTPATIENT
Start: 2022-01-07 | End: 2022-01-14 | Stop reason: HOSPADM

## 2022-01-07 RX ORDER — SODIUM PHOSPHATE, DIBASIC AND SODIUM PHOSPHATE, MONOBASIC 7; 19 G/133ML; G/133ML
1 ENEMA RECTAL
Status: ACTIVE | OUTPATIENT
Start: 2022-01-07 | End: 2022-01-07

## 2022-01-07 RX ORDER — DOCUSATE SODIUM 100 MG/1
200 CAPSULE, LIQUID FILLED ORAL 2 TIMES DAILY
Status: DISCONTINUED | OUTPATIENT
Start: 2022-01-07 | End: 2022-01-14 | Stop reason: HOSPADM

## 2022-01-07 RX ADMIN — METHADONE HYDROCHLORIDE 10 MG: 10 TABLET ORAL at 21:03

## 2022-01-07 RX ADMIN — CIPROFLOXACIN 500 MG: 500 TABLET, FILM COATED ORAL at 08:06

## 2022-01-07 RX ADMIN — POLYETHYLENE GLYCOL 3350 17 G: 17 POWDER, FOR SOLUTION ORAL at 08:04

## 2022-01-07 RX ADMIN — HYDROCODONE BITARTRATE AND ACETAMINOPHEN 1 TABLET: 7.5; 325 TABLET ORAL at 14:07

## 2022-01-07 RX ADMIN — BISACODYL 10 MG: 10 SUPPOSITORY RECTAL at 11:40

## 2022-01-07 RX ADMIN — SENNOSIDES 17.2 MG: 8.6 TABLET, COATED ORAL at 21:02

## 2022-01-07 RX ADMIN — LEVOTHYROXINE SODIUM 88 MCG: 0.09 TABLET ORAL at 06:02

## 2022-01-07 RX ADMIN — BACLOFEN 10 MG: 10 TABLET ORAL at 14:07

## 2022-01-07 RX ADMIN — CEFAZOLIN 2000 MG: 10 INJECTION, POWDER, FOR SOLUTION INTRAVENOUS at 12:42

## 2022-01-07 RX ADMIN — CEFAZOLIN 2000 MG: 10 INJECTION, POWDER, FOR SOLUTION INTRAVENOUS at 05:21

## 2022-01-07 RX ADMIN — BUSPIRONE HYDROCHLORIDE 30 MG: 15 TABLET ORAL at 21:02

## 2022-01-07 RX ADMIN — DOCUSATE SODIUM 200 MG: 100 CAPSULE, LIQUID FILLED ORAL at 21:03

## 2022-01-07 RX ADMIN — PANTOPRAZOLE SODIUM 40 MG: 40 TABLET, DELAYED RELEASE ORAL at 06:02

## 2022-01-07 RX ADMIN — CEFAZOLIN 2000 MG: 10 INJECTION, POWDER, FOR SOLUTION INTRAVENOUS at 21:05

## 2022-01-07 RX ADMIN — METHADONE HYDROCHLORIDE 10 MG: 10 TABLET ORAL at 08:04

## 2022-01-07 RX ADMIN — DOCUSATE SODIUM 200 MG: 100 CAPSULE, LIQUID FILLED ORAL at 08:09

## 2022-01-07 RX ADMIN — DILTIAZEM HYDROCHLORIDE 180 MG: 90 CAPSULE, EXTENDED RELEASE ORAL at 08:05

## 2022-01-07 RX ADMIN — SENNOSIDES 17.2 MG: 8.6 TABLET, COATED ORAL at 08:09

## 2022-01-07 RX ADMIN — BUSPIRONE HYDROCHLORIDE 30 MG: 15 TABLET ORAL at 08:06

## 2022-01-07 RX ADMIN — BACLOFEN 10 MG: 10 TABLET ORAL at 08:04

## 2022-01-07 RX ADMIN — ENOXAPARIN SODIUM 40 MG: 100 INJECTION SUBCUTANEOUS at 08:04

## 2022-01-07 RX ADMIN — BACLOFEN 10 MG: 10 TABLET ORAL at 21:02

## 2022-01-07 RX ADMIN — CIPROFLOXACIN 500 MG: 500 TABLET, FILM COATED ORAL at 21:03

## 2022-01-07 ASSESSMENT — PAIN SCALES - GENERAL
PAINLEVEL_OUTOF10: 6
PAINLEVEL_OUTOF10: 6
PAINLEVEL_OUTOF10: 8
PAINLEVEL_OUTOF10: 5
PAINLEVEL_OUTOF10: 0
PAINLEVEL_OUTOF10: 8

## 2022-01-07 ASSESSMENT — PAIN DESCRIPTION - LOCATION
LOCATION: BACK
LOCATION: BACK

## 2022-01-07 ASSESSMENT — PAIN DESCRIPTION - ORIENTATION: ORIENTATION: MID;LOWER

## 2022-01-07 ASSESSMENT — PAIN DESCRIPTION - DIRECTION: RADIATING_TOWARDS: BILATERAL LEGS

## 2022-01-07 ASSESSMENT — PAIN DESCRIPTION - PAIN TYPE
TYPE: CHRONIC PAIN
TYPE: ACUTE PAIN

## 2022-01-07 ASSESSMENT — PAIN DESCRIPTION - DESCRIPTORS: DESCRIPTORS: THROBBING

## 2022-01-07 NOTE — PROGRESS NOTES
Physical Medicine & Rehabilitation  Progress Note    1/7/2022 11:54 AM     CC: Ambulatory and ADL dysfunction due to status post L2-S1 laminectomy    Subjective:   Feels well. Pain controlled. Positive BM yesterday. Still feels full. Voiding well    ROS:  Denies fevers, chills, sweats. No chest pain, palpitations, lightheadedness. Denies coughing, wheezing or shortness of breath. Denies abdominal pain, nausea, diarrhea or constipation. No new areas of joint pain. Denies new areas of numbness or weakness. Denies new anxiety or depression issues. No new skin problems. Rehabilitation:   PT:  Restrictions/Precautions: General Precautions,Surgical Protocols,Fall Risk,Up as Tolerated  Spinal Precautions: lumbar spine surgery  Other position/activity restrictions: Activity as tolerated. per MD discharge papers pt OK to shower 3-4 days post surgery   Transfers  Sit to Stand: Minimal Assistance  Stand to sit: Minimal Assistance  Bed to Chair: Minimal assistance  Comment: Rolling walker. Good hand placement today. Ambulation 1  Surface: level tile  Device: Rolling Walker  Assistance: Contact guard assistance,Minimal assistance  Quality of Gait: Improved steadiness noted from previous description. Cues for increased L hip flexion at start of swing with G return and increased clearance for L foot. Pt with kyphotic posture and forward head posture. Gait Deviations: Slow Sharon,Shuffles,Decreased step height,Decreased step length  Distance: 80'x2 AM  Comments: No loss of balance          OT:  ADL  Equipment Provided: Reacher,Sock aid,Long-handled sponge  Feeding: Setup  Grooming: Setup  UE Bathing: Setup  LE Bathing: Moderate assistance  UE Dressing: Setup,Stand by assistance  LE Dressing: Moderate assistance  Toileting: Stand by assistance  Additional Comments: AM: OT facilitated pt's engagement in today's session. Eager to shower.  Doffed LB clothing seated on toilet, using the sink/grab bar for support as needed. OT demonstrated and educated use of reacher and sock aid for LB dressing. Fair carry over. Balance  Sitting Balance: Modified independent   Standing Balance: Contact guard assistance   Standing Balance  Time: AM: 2 minutes x2, 1 minute  Activity: AM: transfers, functional mobility, grooming tasks standing at sink  Comment: no LOB noted, UE support on RW or sink   Functional Mobility  Functional - Mobility Device: Rolling Walker  Activity: To/from bathroom  Assist Level: Contact guard assistance  Functional Mobility Comments: Unsteady gait, no LOB- difficulties with LLE (swings leg out, does not  foot). Pt requires max cues for entering/exiting shower. Quite fatigued, requiring w/c for mobility out of bathroom. Bed mobility  Bridging: Minimal assistance  Rolling to Left: Stand by assistance  Rolling to Right: Minimal assistance  Supine to Sit: Minimal assistance  Sit to Supine: Moderate assistance  Scooting: Contact guard assistance  Comment: HOB slightly elevated. Transfers  Stand Step Transfers: Contact guard assistance  Stand Pivot Transfers: Minimal assistance  Sit to stand: Contact guard assistance  Stand to sit: Contact guard assistance  Transfer Comments: good hand placement   Toilet Transfers  Toilet - Technique: Stand pivot  Equipment Used: Grab bars  Toilet Transfer: Minimal assistance  Toilet Transfers Comments: RW     Shower Transfers  Shower - Transfer From: Walker  Shower - Transfer Type: To and From  Shower - Transfer To: Transfer tub bench  Shower - Technique: Ambulating  Shower Transfers: Minimal assistance  Shower Transfers Comments: Max cues for technique and safety. 1 LOB with self recovery. ST:           Objective:  BP (!) 149/82   Pulse 96 Comment: baseline  Temp 98 °F (36.7 °C)   Resp 20   Ht 5' 1\" (1.549 m)   Wt 142 lb 6.4 oz (64.6 kg)   SpO2 99%   BMI 26.91 kg/m²  I Body mass index is 26.91 kg/m².  I   Wt Readings from Last 1 Encounters:   01/05/22 142 lb 6.4 oz (64.6 kg)      Temp (24hrs), Av.3 °F (36.8 °C), Min:98 °F (36.7 °C), Max:98.5 °F (36.9 °C)         GEN: well developed, well nourished, no acute distress  HEENT: Normocephalic atraumatic, EOMI, mucous membranes pink and moist, face symmetric, no numbness on examination sensation intact to light touch and pinprick, no facial droop, no visual deficit, hearing intact, no ptosis no change  CV: RRR, no murmurs, rubs or gallops  PULM: CTAB, no rales or rhonchi. Respirations WNL and unlabored  ABD: Softer abdomen, positive bowel sounds, nontender, still mildly distended-seems less distended and softer today, positive bowel sounds  NEURO: A&O x3. Sensation intact to light touch. MSK: 4+/5 upper lower extremity  EXTREMITIES: No calf tenderness to palpation bilaterally. No edema BLEs  SKIN: warm dry and intact with good turgor, posterior laminectomy incision-increased erythema distally-similar to yesterday, no drainage no worsening  PSYCH: appropriately interactive. Affect WNL.          Medications   Scheduled Meds:   docusate sodium  200 mg Oral BID    senna  2 tablet Oral BID    ceFAZolin  2,000 mg IntraVENous Q8H    bisacodyl  10 mg Rectal Daily    enoxaparin  40 mg SubCUTAneous Daily    baclofen  10 mg Oral TID    busPIRone  30 mg Oral BID    ciprofloxacin  500 mg Oral 2 times per day    dilTIAZem  180 mg Oral Daily    [Held by provider] ferrous sulfate  325 mg Oral BID WC    levothyroxine  88 mcg Oral Daily    methadone  10 mg Oral BID    pantoprazole  40 mg Oral Daily    polyethylene glycol  17 g Oral Daily     Continuous Infusions:  PRN Meds:.fleet, HYDROcodone-acetaminophen, ondansetron **OR** [DISCONTINUED] ondansetron, magnesium hydroxide, acetaminophen     Diagnostics:     CBC:   Recent Labs     22  0711   WBC 6.0   RBC 2.95*   HGB 9.7*   HCT 28.5*   MCV 96.6   RDW 13.3        BMP:   Recent Labs     22  0711      K 3.9   CL 97*   CO2 27   BUN 9   CREATININE 0. 63     BNP: No results for input(s): BNP in the last 72 hours. PT/INR: No results for input(s): PROTIME, INR in the last 72 hours. APTT: No results for input(s): APTT in the last 72 hours. CARDIAC ENZYMES: No results for input(s): CKMB, CKMBINDEX, TROPONINT in the last 72 hours. Invalid input(s): CKTOTAL;3  FASTING LIPID PANEL:  Lab Results   Component Value Date    CHOL 198 07/23/2020     (A) 07/23/2020    TRIG 96 07/23/2020     LIVER PROFILE: No results for input(s): AST, ALT, ALB, BILIDIR, BILITOT, ALKPHOS in the last 72 hours. I/O (24Hr): Intake/Output Summary (Last 24 hours) at 1/7/2022 1154  Last data filed at 1/7/2022 0204  Gross per 24 hour   Intake 550 ml   Output --   Net 550 ml       Glu last 24 hour  No results for input(s): POCGLU in the last 72 hours. No results for input(s): CLARITYU, COLORU, PHUR, SPECGRAV, PROTEINU, RBCUA, BLOODU, BACTERIA, NITRU, WBCUA, LEUKOCYTESUR, YEAST, GLUCOSEU, BILIRUBINUR in the last 72 hours. KUB 1/5/2022  Impression:        Gas-filled loops of small large bowel are identified with improving appearing   ileus or less likely partial obstruction.              Other Diagnoses/plan:     1. Ambulatory and ADL dysfunction due to cauda equina status post lumbar laminectomy- continue rehab efforts, team conference today discharge plan 1/14  2. Spinal stenosis-monitor incision, some increased erythema-stable today perfect serve picture to Dr. Moses Rodríguez of [facility]- 1/6 wants patient on IV Ancef 2 g every 8 hours for 6 doses, aware patient is on Cipro. He will stop by to see the patient 1/7. Patient denies any allergies to penicillin/Ancef, etc. reviewed with patient  3. -UTI -had urinary retention improved with bowel movement-monitor PVRs under 200, monitor daily and as needed, on Cipro through 1/13?  (Urine culture 1/1 sensitive to Cipro) per internal medicine at [place]. Ck's,, noted Urecholine discontinued  4.  Neurogenic bowel/GI/constipation/ileus-abdominal film showing gas-filled loops with improving appearing ileus or less likely partial obstruction- appreciate general surgery eval/follow- Daily Dulcolax with rectal stimulation, enemas as needed ordered, change Senokot to 2 tablets nightly and Colace twice daily, consider Relistor-defer to family practice, ensure bladder emptying cath greater than 350, minimize narcotics-discussed with patient, noted discontinuation of Urecholine and ordered enema  5. GERD-Protonix  6. Spasticity-baclofen  7. Chronic pain/history of lumbar surgeries and cervical spine surgery/postlaminectomy syndrome-on methadone-follows with Dr. Jeana Galvez- will follow up on discharge, currently on methadone 10 twice daily home dose changed to Norco every 8 hours. By family practice-advised patient to minimize  8. Hypertension-diltiazem  9. Depression-BuSpar  10. Hypothyroid-Synthroid  11. Anemia-on iron- hold due to constipation, hemoglobin 9.7  12. DVT prophylaxis-Lovenox  13. Family practice-Dr. Cosme Hernandez for medical management, appreciate medical management         Geno Schwartz. Delia Dior MD       This note is created with the assistance of a speech recognition program.  While intending to generate a document that actually reflects the content of the visit, the document can still have some errors including those of syntax and sound a like substitutions which may escape proof reading.   In such instances, actual meaning can be extrapolated by contextual diversion

## 2022-01-07 NOTE — PROGRESS NOTES
IV removed from [facility] by writer.  Warm compress applied to reduce swelling

## 2022-01-07 NOTE — PROGRESS NOTES
Physical Therapy  [address]  Acute Rehabilitation Physical Therapy Progress Note    Date: 22  Patient Name: Melanie Abarca       Room: 7680/3005-45  MRN: [id number]   Account: [de-identified]   : 1960  (62 y.o.) Gender: female      Diagnosis: Lumbar stenosis with neurogenic claudication, L2-S1 laminectomy on 21 for cauda equina syndrome (Dr. Christine Navarro), Cervical stenosis with myelopathy, decreased BLE strength , L weaker than R,  Past Medical History:  has a past medical history of Anxiety, Arthritis, At maximum risk for fall, Cancer ([address] Cauda equina syndrome ([address] Cervical stenosis of spine, GERD (gastroesophageal reflux disease), History of stomach ulcers, Hypertension, Hypothyroidism, Lumbar neuritis, Post laminectomy syndrome, Spinal deformity, and Thyroid disease. Past Surgical History:   has a past surgical history that includes back surgery; hernia repair (Bilateral); Breast surgery (Right); cervical laminectomy (2014); Mastectomy, radical; Hysterectomy, total abdominal; and Lumbar spine surgery (N/A, 2021). Additional Pertinent Hx:   Patient presents with decline in her mobility, LE weakness over the course of the last few months where she can barely even ambulate with the walker. Pt admitted to [facility] and underwent  lumbar laminectomy L2-S1 for cauda equina decompression on 22. Pt presents with B LE weakness, L LE > R LE. Pt admitted to rehab unit on 22. Restrictions/Precautions  Restrictions/Precautions: General Precautions;Surgical Protocols; Fall Risk;Up as Tolerated  Required Braces or Orthoses?: No  Position Activity Restriction  Spinal Precautions: lumbar spine surgery  Other position/activity restrictions: Activity as tolerated. per MD discharge papers pt OK to shower 3-4 days post surgery    Subjective: Pt states she's doing well, feels like her L LE is getting stronger.      Vital Signs  Pulse: 96 (baseline)  Patient Currently in Pain: Denies    Oxygen Therapy  SpO2: 99 %  O2 Device: None (Room air)     Patient Observation  Observations: Pleasant and cooperative, motivated. Transfers  Sit to Stand: Minimal Assistance  Stand to sit: Minimal Assistance  Stand pivot transfers: Minimal Assistance  Comment: Rolling walker. Good hand placement today. Erratic eccentric control; discussed using shoes for improved traction. Ambulation 1  Surface: level tile  Device: Rolling Walker  Assistance: Contact guard assistance;Minimal assistance  Quality of Gait: Improved steadiness noted from previous description. Cues for increased L hip flexion at start of swing with G return and increased clearance for L foot. Pt with kyphotic posture and forward head posture. Gait Deviations: Slow Sharon; Shuffles;Decreased step height;Decreased step length  Distance: 80'x2 AM; 100' PM  Comments: No loss of balance    Stairs/Curb  Stairs?: Yes  Stairs  # Steps : 10 (x2)  Stairs Height:  (4\"/6\")  Rails: Bilateral  Device: No Device  Assistance: Contact guard assistance (+ 2nd person SBA for safety)  Comment: Reviewed sequencing before starting with good initial return; stepped down with R LE on initial return over step with no difficulty noted, but Pt returned to L LE descent after, cues given. Decrease LLE foot clearance; G return to cues for increased L hip flexion. Balance   Posture: Fair  Standing - Static: Fair (rolling walker)  Standing - Dynamic: Fair;- (rolling walker)     Exercises   Other exercises?: Yes  Other exercises 1: Seated bilateral LE exercises, 15 reps each, active ROM & green (minimal +) resistance band  Other exercises 2: Standing bilateral LE exercises, 12-15x each, bilateral UE support + CGA. (Fatigued easily, from seated break p each ex. to each side)  Other exercises 3: Nustep L3 at 5min. Seat at 8 and arms at 9. Other exercises 5: Sit <> stand x5 with R LE on 6\" step, Mod A initially progressing to [address] with UE support.    Other exercises 6: Stand pivot transfers x2    Activity Tolerance: Patient limited by endurance,Patient Tolerated treatment well     PT Equipment Recommendations  Equipment Needed: No    Current Treatment Recommendations: Strengthening,ROM,Stair training,Balance Training,Gait Training,Patient/Caregiver Education & Training,Equipment Evaluation, Education, & procurement,Neuromuscular Re-education,Functional Mobility Training,Endurance Training,Transfer Training,Safety Education & Training,Home Exercise Program    Conditions Requiring Skilled Therapeutic Intervention  Body structures, Functions, Activity limitations: Decreased functional mobility ; Decreased balance;Decreased endurance;Decreased strength; Increased pain;Decreased sensation  REQUIRES PT FOLLOW UP: Yes  Discharge Recommendations: Patient would benefit from continued therapy after discharge;Home with assist PRN    Goals  Short term goals  Time Frame for Short term goals: 1 week  Short term goal 1: Pt will ambulate 200 feet with a RW and SBA to increase functional independence. Short term goal 2: Pt able to perform bed mobility at SBA  Short term goal 3: Pt will demonstrate good- standing balance with rolling walker to decrease fall risk. Short term goal 4: Pt will perform sit<>stand and pivot transfer with rolling walker SBA to increase functional independence. Short term goal 5: Pt will negotiate 5 stair with 2 handrail and CGA to allow the pt to enter prior living arrangements. Long term goals  Time Frame for Long term goals : By DC  Long term goal 1: Pt able to perform bed mobility mod-I  Long term goal 2: Pt able to transfer at mod-I  Long term goal 3: Pt able to ambulate with rolling walker distance of 150 ft, mod-I, level surfaces  Long term goal 4:  Pt able to ambulate on incline surface at SBA with rolling walker.   Long term goal 5: Pt able to get up and down curb steps with UE support, CGA  Long term goal 6: Improve 2MWT distance to 100 ft to improve gait speed and overall fucntion. Long term goal 7: Pt able to go up and 12 steps with 2 UE support at SBA to improve B LE strength.        01/07/22 1003 01/07/22 1423   PT Individual Minutes   Time In 1003 1423   Time Out 1105 1452   Minutes 62 29     Electronically signed by Michele Blanco PTA on 1/7/22 at 4:18 PM EST

## 2022-01-07 NOTE — PROGRESS NOTES
Nutrition Note    Pt states she took a suppository, had a large BM and is feeling better. Ate portion of lunch and drank all of Ensure. Will continue current diet and oral nutrition supplements. Flaca Tobin R.D., JOHN.   Phone: [phone]

## 2022-01-07 NOTE — PROGRESS NOTES
PROGRESS NOTE      PATIENT NAME: Sha Abebe RECORD NO. [id number]  DATE: 2022  SURGEON: Keisha Palomo  PRIMARY CARE PHYSICIAN: Monae Angulo MD    HD: # 3    ASSESSMENT    Patient Active Problem List   Diagnosis    Cervical stenosis of spine    Essential hypertension    Hypothyroidism    Lumbar radiculopathy, chronic    Lumbar neuritis    Post laminectomy syndrome    Hypokalemia    Pain of right hip joint    Post-menopausal osteoporosis    Chronic gastritis without bleeding    Elevated CEA    Esophageal dysphagia    Hypotension due to drugs    Ulcerative esophagitis    Weight loss, abnormal    Cervical myelopathy (HCC)    Lumbar stenosis with neurogenic claudication    Spinal deformity    Anxiety about health    Cauda equina compression (HCC)    Anemia, normocytic normochromic    Iron deficiency    Pseudomonas urinary tract infection    Hypocalcemia    Cauda equina syndrome [facility])       MEDICAL DECISION MAKING AND PLAN    S/p laminectomy for cauda equina  Neurogenic bladder  Neurogenic bowel  Ileus post op  Constipation- chronic OP, issue inpt  Use of chronic narcotics- methadone  Use of acute narcotics- dose increased 1/6              Aggressive bowel regimen    She is declining miralax and took one dulcolax suppository      yesterday          Had 1 bm smallyesterday        Some distention softer able to participate in therapy            . Scheduled bowel meds                                  Recommendations:      Enema prn     Daily dulcolax with rectal stimulation                          Goal is 1-2 BMs a day- use enemas etc as needed    No surgical intervention indicated. Aggressive bowel management. Surgery will be available as needed, please call back prn      SUBJECTIVE    Yosefsony Clarke is about the same.  Up in chair, doing well, softer but distended    OBJECTIVE  VITALS: Temp: Temp: 98 °F (36.7 °C)Temp  Av.3 °F (36.8 °C)  Min: 98 °F (36.7 °C)  Max: 98.5 °F (91.7 °C) BP Systolic (80ICK), DXP:761 , Min:128 , FCV:276   Diastolic (93IQL), YRO:97, Min:64, Max:82   Pulse Pulse  Av.5  Min: 84  Max: 95 Resp Resp  Av  Min: 18  Max: 20 Pulse ox SpO2  Av.5 %  Min: 96 %  Max: 99 %    Physical Exam  Constitutional:       Appearance: Normal appearance. She is obese. HENT:      Head: Normocephalic. Mouth/Throat:      Pharynx: Oropharynx is clear. Eyes:      Extraocular Movements: Extraocular movements intact. Cardiovascular:      Pulses: Normal pulses. Pulmonary:      Effort: Pulmonary effort is normal.   Musculoskeletal:      Comments: Weakness L>R LE   Neurological:      Mental Status: She is alert. abd distended but soft, no BM today, taking meds +flatus    I/O last 3 completed shifts: In: 1030 [P.O.:960; I.V.:70]  Out: -     Drain/tube output: In: 550 [P.O.:480; I.V.:70]  Out: -     LAB:  CBC:   Recent Labs     22  0711   WBC 6.0   HGB 9.7*   HCT 28.5*   MCV 96.6        BMP:   Recent Labs     22  0711      K 3.9   CL 97*   CO2 27   BUN 9   CREATININE 0.63   GLUCOSE 94     COAGS: No results for input(s): APTT, PROT, INR in the last 72 hours. Attending Attestation:    I personally evaluated the patient and directed the medical decision making with Resident/AILEEN after the physical/radiologic exam and laboratory values were reviewed and confirmed. Continue bowel regimen as needed. Minimize opioid use. Tolerating diet. Please call with any questions or concerns.     Alejandra Ribeiro MD

## 2022-01-07 NOTE — PROGRESS NOTES
functional mobility, grooming tasks standing at sink  Comment: no LOB noted, UE support on RW or sink   Functional Mobility  Functional - Mobility Device: Rolling Walker  Activity: To/from bathroom  Assist Level: Contact guard assistance  Functional Mobility Comments: Unsteady gait, no LOB- difficulties with LLE (swings leg out, does not  foot). Pt requires max cues for entering/exiting shower. Quite fatigued, requiring w/c for mobility out of bathroom. Toilet Transfers  Toilet - Technique: Ambulating  Equipment Used: Grab bars  Toilet Transfer: Contact guard assistance  Toilet Transfers Comments: with RW, good hand placement this date  Shower Transfers  Shower - Transfer From: Baldo Walker - Transfer Type: To and From  Shower - Transfer To: Transfer tub bench  Shower - Technique: Ambulating  Shower Transfers: Minimal assistance  Shower Transfers Comments: Max cues for technique and safety. 1 LOB with self recovery. Type of ROM/Therapeutic Exercise  Type of ROM/Therapeutic Exercise: Free weights (1# for L shoulder, 2# for R shoulder/elbow and L elbow)  Comment: PM: Pt engaged in BUE exercises to promote strength/endurance for functional use. 2 sets x10 reps. Increased time due to SOB, cued to take rest breaks as needed. Exercises  Shoulder Flexion: X  Shoulder Extension: X  Elbow Flexion: X  Elbow Extension: X  Supination: X  Pronation: X                       ADL  Equipment Provided: Reacher;Sock aid;Long-handled sponge  Feeding: Setup  Grooming: Setup  UE Bathing: Setup  LE Bathing: Moderate assistance  UE Dressing: Setup;Stand by assistance  LE Dressing: Moderate assistance  Toileting: Stand by assistance  Additional Comments: AM: OT facilitated pt's engagement in today's session. Eager to shower. Doffed LB clothing seated on toilet, using the sink/grab bar for support as needed. OT demonstrated and educated use of reacher and sock aid for LB dressing. Fair carry over. Assessment  Performance deficits / Impairments: Decreased functional mobility ; Decreased ADL status; Decreased endurance;Decreased high-level IADLs;Decreased balance;Decreased coordination;Decreased sensation;Decreased posture;Decreased ROM; Decreased strength;Decreased fine motor control  Prognosis: Good  Discharge Recommendations: Patient would benefit from continued therapy after discharge  Activity Tolerance: Patient Tolerated treatment well;Patient limited by pain  Safety Devices in place: Yes  Type of devices: All fall risk precautions in place;Call light within reach;Gait belt;Patient at risk for falls; Left in chair  Equipment Recommendations  Equipment Needed: Yes  Sock Aid: X       Patient Education: ed pt on OT POC, use of AE for LB dressing (sock aid, reacher) and bathing (LHS); safe use of DME, energy conservation  Patient Goals   Patient goals : walk safely to be home alone while spouse at work  Learner:patient  Method: demonstration and explanation       Outcome: needs reinforcement        Plan  Plan  Times per week: 5-7  Times per day: Twice a day  Current Treatment Recommendations: Strengthening,Patient/Caregiver Education & Training,Home Management Training,Equipment Evaluation, Education, & procurement,Balance Training,Functional Mobility Training,Endurance Training,Safety Education & Training,Self-Care / ADL  Patient Goals   Patient goals : walk safely to be home alone while spouse at work  Short term goals  Time Frame for Short term goals: 1 week  Short term goal 1: min assist LE self care  Short term goal 2: CGA amb to obtain set up for adls with good walker technique  Short term goal 3: joseph 8-9 min stand , ambulate for adls with RW and CGA  Short term goal 4: joseph 10 reps x 2 B shld ex with 1 lb weight on L and 2 lb weight on R  and 20 reps x 2 elbow ex with 2 lb wt on L and 3 lb on R for work joseph and UE strengthening.   Short term goal 5: joseph repetitive  ex LUE with mod resistance for hand strengthening. Long term goals  Time Frame for Long term goals : by discharge  Long term goal 1: mod indep self care  Long term goal 2: mod indep amb to obtain set up for adls with good walker safety  Long term goal 3: joseph 11-13 min stand mod indep during adls  Long term goal 4: mod assist with transfer to tub to use tub seat vs remove shower doors for extended tub bench transfer with min assist.  Long term goal 5: mod indep with simple advanced adls with good walker safety and good balance. Long term goals 6: indep with BUE HEP for strengthening and coordination. Long term goal 7: increase L hand  strength to 40 pounds.        Electronically signed by Kt Mao OT on 1/7/22 at 3:59 PM EST       01/07/22 1558 01/07/22 1559   OT Individual Minutes   Time In 5905 4325   Time Out 1820 2301   Minutes 70 30

## 2022-01-07 NOTE — PROGRESS NOTES
Pamela Rice, [facility] house supervisor initiated IV left wrist at 0005. 22 g one attempt. Writer continued Ancef 2000g, but ran the drip at 50 ml/hr to avoid infiltration. 0204: IV antibiotic complete. Writer slowly flushed INT, locked, and capped.

## 2022-01-07 NOTE — PLAN OF CARE
Problem: Pain:  Goal: Pain level will decrease  Description: Pain level will decrease  1/7/2022 0334 by Jamal Bruce LPN  Outcome: Ongoing  1/6/2022 1639 by Cori Turner RN  Outcome: Ongoing  Note: Patient's pain is well controlled with current regimen. See MAR. Goal: Control of acute pain  Description: Control of acute pain  1/7/2022 0334 by Jamal Brcue LPN  Outcome: Ongoing  1/6/2022 1639 by Cori Turner RN  Outcome: Ongoing  Goal: Control of chronic pain  Description: Control of chronic pain  1/7/2022 0334 by Jamal Bruce LPN  Outcome: Ongoing  1/6/2022 1639 by Cori Turner RN  Outcome: Ongoing     Problem: Falls - Risk of:  Goal: Will remain free from falls  Description: Will remain free from falls  1/7/2022 0334 by Jamal Bruce LPN  Outcome: Ongoing  1/6/2022 1639 by Cori Turner RN  Outcome: Ongoing  Note: Patient remains free of falls and injuries throughout shift. Bed remains in the lowest position, wheels locked, call light and bedside table are within reach. Goal: Absence of physical injury  Description: Absence of physical injury  1/7/2022 0334 by Jamal Bruce LPN  Outcome: Ongoing  1/6/2022 1639 by Cori Turner RN  Outcome: Ongoing     Problem: Skin Integrity:  Goal: Will show no infection signs and symptoms  Description: Will show no infection signs and symptoms  1/7/2022 0334 by Jamal Bruce LPN  Outcome: Ongoing  1/6/2022 1639 by Cori Turner RN  Outcome: Ongoing  Note: No signs of increased skin or tissue breakdown is noted. See Head to Toe/LDA assessments in flowsheets.    Goal: Absence of new skin breakdown  Description: Absence of new skin breakdown  1/7/2022 0334 by Jamal Bruce LPN  Outcome: Ongoing  1/6/2022 1639 by Cori Turner RN  Outcome: Ongoing     Problem: Musculor/Skeletal Functional Status  Goal: Highest potential functional level  1/7/2022 0334 by Jamal Bruce LPN  Outcome: Ongoing  1/6/2022 [id number] by Lieutenant Sapna RN  Outcome: Ongoing  Goal: Absence of falls  1/7/2022 0334 by Matt Nunez LPN  Outcome: Ongoing  1/6/2022 1639 by Lieutenant Sapna RN  Outcome: Ongoing     Problem: Musculor/Skeletal Functional Status  Goal: Highest potential functional level  1/7/2022 0334 by Matt Nunez LPN  Outcome: Ongoing  1/6/2022 1639 by Lieutenant Sapna RN  Outcome: Ongoing  Goal: Absence of falls  1/7/2022 0334 by Matt Nunez LPN  Outcome: Ongoing  1/6/2022 1639 by Lieutenant Sapna RN  Outcome: Ongoing     Problem: Nutrition  Goal: Optimal nutrition therapy  1/7/2022 0334 by Matt Nunez LPN  Outcome: Ongoing  1/6/2022 1639 by Lieutenant Sapna RN  Outcome: Ongoing

## 2022-01-07 NOTE — PROGRESS NOTES
Progress Note    1/7/2022 8:28 AM  Subjective: Interval History: Patient states she still feels distended and she has not been able to have a good bowel movement. The nursing staff report that her bladder scan shows somewhere between 120-160 urine retention after urination. Patient states she feels she has to go frequently  Patient states she still gets intermittent fuzzy feeling but not pain in the left lower jaw and superior sternocleidomastoid muscle and in the left occipital area    Diet: ADULT DIET; Regular  ADULT ORAL NUTRITION SUPPLEMENT; Lunch, Dinner; Standard High Calorie/High Protein Oral Supplement    Medications:   Reviewed medications    Labs:   CBC: Recent Labs     01/05/22  0711   WBC 6.0   HGB 9.7*        BMP:    Recent Labs     01/05/22  0711      K 3.9   CL 97*   CO2 27   BUN 9   CREATININE 0.63   GLUCOSE 94     INR: No results for input(s): INR in the last 72 hours. Objective:   Vitals: BP (!) 149/82   Pulse 95   Temp 98 °F (36.7 °C)   Resp 20   Ht 5' 1\" (1.549 m)   Wt 142 lb 6.4 oz (64.6 kg)   SpO2 99%   BMI 26.91 kg/m²   General appearance: alert and cooperative with exam  Neck: no adenopathy and thyroid not enlarged, symmetric, no tenderness/mass/nodules  Lungs: clear to auscultation bilaterally  Heart: regular rate and rhythm, S1, S2 normal, no murmur, click, rub or gallop  Abdomen: Mild distention. Normal bowel movements  Extremities: extremities normal, atraumatic, no cyanosis or edema  Neurologic: Mental status: Alert, oriented, thought content appropriate  Lumbar spine incision shows a small amount of inferior dehiscence along with some mild redness. Her facial muscles move equally left and right. She still has spasm but not tenderness in the superior sternocleidomastoid muscle. After examination she feels that felt fuzzy feeling is increased in that area. Assessment and Plan:   . Postop spinal surgery.   Urinary tract infection, culture from January 1 shows Pseudomonas  Constipation, abdominal distention. Mild urine retention. Her lumbar wound has very small area of dehiscence and redness  Right jaw and upper sternocleidomastoid muscle dyskinesia    Plan: Patient currently on IV Ancef for the wound dehiscence. Her urine culture from January 1 showed Pseudomonas which is sensitive to Cipro which she is receiving. Since Urecholine could exacerbate the bladder and bowel issues this will be discontinued. An enema will be ordered.     Patient Active Problem List:     Cervical stenosis of spine     Essential hypertension     Hypothyroidism     Lumbar radiculopathy, chronic     Lumbar neuritis     Post laminectomy syndrome     Hypokalemia     Pain of right hip joint     Post-menopausal osteoporosis     Chronic gastritis without bleeding     Elevated CEA     Esophageal dysphagia     Hypotension due to drugs     Ulcerative esophagitis     Weight loss, abnormal     Cervical myelopathy (HCC)     Lumbar stenosis with neurogenic claudication     Spinal deformity     Anxiety about health     Cauda equina compression (HCC)     Anemia, normocytic normochromic     Iron deficiency     Pseudomonas urinary tract infection     Hypocalcemia     Cauda equina syndrome [facility])      Electronically signed by Lawrence Ames MD on 1/7/2022 at 8:28 AM

## 2022-01-08 PROCEDURE — 97535 SELF CARE MNGMENT TRAINING: CPT

## 2022-01-08 PROCEDURE — 6370000000 HC RX 637 (ALT 250 FOR IP): Performed by: NURSE PRACTITIONER

## 2022-01-08 PROCEDURE — 6360000002 HC RX W HCPCS: Performed by: PHYSICAL MEDICINE & REHABILITATION

## 2022-01-08 PROCEDURE — 1180000000 HC REHAB R&B

## 2022-01-08 PROCEDURE — 6360000002 HC RX W HCPCS: Performed by: NURSE PRACTITIONER

## 2022-01-08 PROCEDURE — 6370000000 HC RX 637 (ALT 250 FOR IP): Performed by: PHYSICAL MEDICINE & REHABILITATION

## 2022-01-08 PROCEDURE — 97116 GAIT TRAINING THERAPY: CPT

## 2022-01-08 PROCEDURE — 97530 THERAPEUTIC ACTIVITIES: CPT

## 2022-01-08 PROCEDURE — 97110 THERAPEUTIC EXERCISES: CPT

## 2022-01-08 RX ADMIN — BACLOFEN 10 MG: 10 TABLET ORAL at 13:54

## 2022-01-08 RX ADMIN — METHADONE HYDROCHLORIDE 10 MG: 10 TABLET ORAL at 21:45

## 2022-01-08 RX ADMIN — SENNOSIDES 17.2 MG: 8.6 TABLET, COATED ORAL at 08:01

## 2022-01-08 RX ADMIN — ENOXAPARIN SODIUM 40 MG: 100 INJECTION SUBCUTANEOUS at 08:07

## 2022-01-08 RX ADMIN — CIPROFLOXACIN 500 MG: 500 TABLET, FILM COATED ORAL at 21:47

## 2022-01-08 RX ADMIN — POLYETHYLENE GLYCOL 3350 17 G: 17 POWDER, FOR SOLUTION ORAL at 08:07

## 2022-01-08 RX ADMIN — LEVOTHYROXINE SODIUM 88 MCG: 0.09 TABLET ORAL at 05:54

## 2022-01-08 RX ADMIN — BUSPIRONE HYDROCHLORIDE 30 MG: 15 TABLET ORAL at 08:02

## 2022-01-08 RX ADMIN — BACLOFEN 10 MG: 10 TABLET ORAL at 08:00

## 2022-01-08 RX ADMIN — METHADONE HYDROCHLORIDE 10 MG: 10 TABLET ORAL at 08:00

## 2022-01-08 RX ADMIN — DOCUSATE SODIUM 200 MG: 100 CAPSULE, LIQUID FILLED ORAL at 08:00

## 2022-01-08 RX ADMIN — CEFAZOLIN 2000 MG: 10 INJECTION, POWDER, FOR SOLUTION INTRAVENOUS at 05:54

## 2022-01-08 RX ADMIN — PANTOPRAZOLE SODIUM 40 MG: 40 TABLET, DELAYED RELEASE ORAL at 05:54

## 2022-01-08 RX ADMIN — BACLOFEN 10 MG: 10 TABLET ORAL at 21:46

## 2022-01-08 RX ADMIN — DILTIAZEM HYDROCHLORIDE 180 MG: 90 CAPSULE, EXTENDED RELEASE ORAL at 08:02

## 2022-01-08 RX ADMIN — CIPROFLOXACIN 500 MG: 500 TABLET, FILM COATED ORAL at 08:02

## 2022-01-08 RX ADMIN — BUSPIRONE HYDROCHLORIDE 30 MG: 15 TABLET ORAL at 21:48

## 2022-01-08 ASSESSMENT — PAIN DESCRIPTION - ORIENTATION: ORIENTATION: RIGHT;LEFT

## 2022-01-08 ASSESSMENT — PAIN SCALES - GENERAL
PAINLEVEL_OUTOF10: 6
PAINLEVEL_OUTOF10: 7
PAINLEVEL_OUTOF10: 5

## 2022-01-08 ASSESSMENT — PAIN DESCRIPTION - LOCATION: LOCATION: BACK;LEG

## 2022-01-08 ASSESSMENT — PAIN DESCRIPTION - DESCRIPTORS: DESCRIPTORS: SORE

## 2022-01-08 ASSESSMENT — PAIN DESCRIPTION - PAIN TYPE: TYPE: CHRONIC PAIN

## 2022-01-08 NOTE — PLAN OF CARE
Problem: Pain:  Goal: Pain level will decrease  Description: Pain level will decrease  1/8/2022 1121 by Landon Aguiar RN  Outcome: Ongoing  1/8/2022 0228 by Fabiana Neff RN  Outcome: Ongoing  Goal: Control of acute pain  Description: Control of acute pain  1/8/2022 1121 by Landon Aguiar RN  Outcome: Ongoing  1/8/2022 0228 by Fabiana Neff RN  Outcome: Ongoing  Goal: Control of chronic pain  Description: Control of chronic pain  1/8/2022 1121 by Landon Aguiar RN  Outcome: Ongoing  1/8/2022 0228 by Fabiana Neff RN  Outcome: Ongoing     Problem: Falls - Risk of:  Goal: Will remain free from falls  Description: Will remain free from falls  1/8/2022 1121 by Landon Aguiar RN  Outcome: Ongoing  1/8/2022 0228 by Fabiana Neff RN  Outcome: Ongoing  Goal: Absence of physical injury  Description: Absence of physical injury  1/8/2022 1121 by Landon Aguiar RN  Outcome: Ongoing  1/8/2022 0228 by Fabiana Neff RN  Outcome: Ongoing     Problem: Skin Integrity:  Goal: Will show no infection signs and symptoms  Description: Will show no infection signs and symptoms  1/8/2022 1121 by Landon Aguiar RN  Outcome: Ongoing  1/8/2022 0228 by Fabiana Neff RN  Outcome: Ongoing  Goal: Absence of new skin breakdown  Description: Absence of new skin breakdown  1/8/2022 1121 by Landon Aguiar RN  Outcome: Ongoing  1/8/2022 0228 by Fabiana Neff RN  Outcome: Ongoing     Problem: Musculor/Skeletal Functional Status  Goal: Highest potential functional level  1/8/2022 1121 by Landon Aguiar RN  Outcome: Ongoing  1/8/2022 0228 by Fabiana Neff RN  Outcome: Ongoing  Goal: Absence of falls  1/8/2022 1121 by Landon Aguiar RN  Outcome: Ongoing  1/8/2022 0228 by Fabiana Neff RN  Outcome: Ongoing     Problem: Musculor/Skeletal Functional Status  Goal: Highest potential functional level  1/8/2022 1121 by Landon Aguiar RN  Outcome: Ongoing  1/8/2022 0228 by Fabiana Stabs, RN  Outcome: Ongoing  Goal: Absence of falls  1/8/2022 1121 by Landon Aguiar RN  Outcome: Ongoing  1/8/2022 0228 by Jayce Paulino RN  Outcome: Ongoing     Problem: Nutrition  Goal: Optimal nutrition therapy  1/8/2022 1121 by Johnna Casillas RN  Outcome: Ongoing  1/8/2022 0228 by Jayce Paulino RN  Outcome: Ongoing

## 2022-01-08 NOTE — PLAN OF CARE
Problem: Pain:  Goal: Pain level will decrease  Description: Pain level will decrease  1/8/2022 0228 by Joelle Mustafa RN  Outcome: Ongoing  1/7/2022 1809 by Gabriela Addison RN  Outcome: Ongoing  Goal: Control of acute pain  Description: Control of acute pain  1/8/2022 0228 by Joelle Mustafa RN  Outcome: Ongoing  1/7/2022 1809 by Gabriela Addison RN  Outcome: Ongoing  Goal: Control of chronic pain  Description: Control of chronic pain  1/8/2022 0228 by Joelle Mustafa RN  Outcome: Ongoing  1/7/2022 1809 by Gabriela Addison RN  Outcome: Ongoing     Problem: Falls - Risk of:  Goal: Will remain free from falls  Description: Will remain free from falls  1/8/2022 0228 by Joelle Mustafa RN  Outcome: Ongoing  1/7/2022 1809 by Gabriela Addison RN  Outcome: Ongoing  Goal: Absence of physical injury  Description: Absence of physical injury  1/8/2022 0228 by Joelle Mustafa RN  Outcome: Ongoing  1/7/2022 1809 by Gabriela Addison RN  Outcome: Ongoing     Problem: Skin Integrity:  Goal: Will show no infection signs and symptoms  Description: Will show no infection signs and symptoms  1/8/2022 0228 by Joelle Mustafa RN  Outcome: Ongoing  1/7/2022 1809 by Gabriela Addison RN  Outcome: Ongoing  Goal: Absence of new skin breakdown  Description: Absence of new skin breakdown  1/8/2022 0228 by Joelle Mustafa RN  Outcome: Ongoing  1/7/2022 1809 by Gabriela Addison RN  Outcome: Ongoing     Problem: Musculor/Skeletal Functional Status  Goal: Highest potential functional level  Outcome: Ongoing  Goal: Absence of falls  Outcome: Ongoing     Problem: Musculor/Skeletal Functional Status  Goal: Highest potential functional level  Outcome: Ongoing  Goal: Absence of falls  Outcome: Ongoing     Problem: Nutrition  Goal: Optimal nutrition therapy  Outcome: Ongoing

## 2022-01-08 NOTE — PROGRESS NOTES
[address]   ACUTE REHABILITATION OCCUPATIONAL THERAPY  DAILY NOTE    Date: 22  Patient Name: Abebe Dash      Room: 8799/3435-64    MRN: [id number]   : 1960  (62 y.o.)  Gender: female      Diagnosis: Lumbar stenosis with neurogenic claudication, L2-S1 laminectomy on 21 for cauda equina syndrome (Dr. Nadia Argueta), Cervical stenosis with myelopathy, decreased BLE strength , L weaker than R,  Additional Pertinent Hx: worsening function over past 2 months - has needed more assist with advanced adls. wears pullups at home due to urgency issues. needs cervical spine surgery when able post this surgery. Left facial symptoms: could be early bells palsy    Restrictions  Restrictions/Precautions: General Precautions,Surgical Protocols,Fall Risk,Up as Tolerated  Spinal Precautions: lumbar spine surgery  Other position/activity restrictions: Activity as tolerated. per MD discharge papers pt OK to shower 3-4 days post surgery  Required Braces or Orthoses?: No    Subjective  Subjective: \"my back is sore but that's normal for me\"  Comments: pt pleasant and cooperative, good efforts noted/motivated  Patient Currently in Pain: Yes  Pain Level: 5  Pain Location: Back;Leg  Pain Orientation: Right;Left  Restrictions/Precautions: General Precautions;Surgical Protocols; Fall Risk;Up as Tolerated  Overall Orientation Status: Within Functional Limits  Patient Observation  Observations: motivated to return home  Pain Assessment  Pain Assessment: 0-10  Pain Level: 5  Pain Type: Chronic pain  Pain Location: Back,Leg  Pain Orientation: Right,Left  Pain Descriptors: Sore    Objective  Cognition  Overall Cognitive Status: WFL  Perception  Overall Perceptual Status: WFL  Balance  Sitting Balance: Modified independent   Standing Balance: Contact guard assistance (CGA/close SBA)  Transfers  Stand Step Transfers: Contact guard assistance (with R/W)  Sit to stand: Contact guard assistance  Stand to sit: Contact guard assistance  Transfer Comments: good hand placement noted  Standing Balance  Time: AM: 1-2 min x5; PM: 1-2 min, 1 min  Activity: AM: functional mobility/transfers in room/bathroom, self care tasks; PM: functional mobility in room/bathroom, toileting tasks/washing hands sinkside  Comment: no LOB noted, VCs for UE support with dynamic tasks-good carryover noted  Functional Mobility  Functional - Mobility Device: Rolling Walker  Activity: To/from bathroom  Assist Level: Contact guard assistance  Functional Mobility Comments: ataxic gait noted/unsteady but no gross LOB noted  Toilet Transfers  Toilet - Technique: Ambulating  Equipment Used: Grab bars  Toilet Transfer: Contact guard assistance  Toilet Transfers Comments: with RW, good hand placement this date  Shower Transfers  Shower - Transfer From: Theotis Ravindra - Transfer Type: To and From  Shower - Transfer To:  Transfer tub bench  Shower - Technique: Ambulating  Shower Transfers: Contact Guard  Shower Transfers Comments: with R/W, cuing for tech crossing threshold, no LOB noted           Additional Activities: PM: pt attempted button ADL board, several attempts made utilizing alternative adaptive strategies-unsuccessful, task to address FM skills, due to decreased sensation in L hand pt unable to complete-will continue to assess; card activity completed to place all cards in piles by suit, task to address bimanual hand coordination and manipulation, increased time to complete, no assist req for completion, pt flipping over several cards at same time due to decreased sensation, able to self correct, pt attempting to shuffle cards with several cards falling out of deck, pt motivated/determined and gives good efforts-continue to assess to address FM skills                 ADL  Equipment Provided: Reacher;Sock aid;Long-handled sponge  Feeding: Setup  Grooming: Setup  UE Bathing: Setup  LE Bathing: Contact guard assistance;Setup;Verbal cueing (CGA standing, technique  Short term goal 3: joseph 8-9 min stand , ambulate for adls with RW and CGA  Short term goal 4: joseph 10 reps x 2 B shld ex with 1 lb weight on L and 2 lb weight on R  and 20 reps x 2 elbow ex with 2 lb wt on L and 3 lb on R for work joseph and UE strengthening. Short term goal 5: joseph repetitive  ex LUE with mod resistance for hand strengthening. Long term goals  Time Frame for Long term goals : by discharge  Long term goal 1: mod indep self care  Long term goal 2: mod indep amb to obtain set up for adls with good walker safety  Long term goal 3: joseph 11-13 min stand mod indep during adls  Long term goal 4: mod assist with transfer to tub to use tub seat vs remove shower doors for extended tub bench transfer with min assist.  Long term goal 5: mod indep with simple advanced adls with good walker safety and good balance. Long term goals 6: indep with BUE HEP for strengthening and coordination. Long term goal 7: increase L hand  strength to 40 pounds.         01/08/22 1002 01/08/22 1301   OT Individual Minutes   Time In 1002 1301   Time Out 1103 1332   Minutes 61 31     Electronically signed by GOOD Aragon on 1/8/22 at 3:19 PM EST

## 2022-01-08 NOTE — PROGRESS NOTES
Physical Therapy  [address] [facility]   Acute Rehabilitation Physical Therapy Progress Note    Date: 22  Patient Name: Yosef Clarke       Room: 7023/9038-03  MRN: [id number]   Account: [de-identified]   : 1960  (62 y.o.) Gender: female        Diagnosis: Lumbar stenosis with neurogenic claudication, L2-S1 laminectomy on 21 for cauda equina syndrome (Dr. Nic Guzmán), Cervical stenosis with myelopathy, decreased BLE strength , L weaker than R,  Past Medical History:  has a past medical history of Anxiety, Arthritis, At maximum risk for fall, Cancer ([facility] [address] Cauda equina syndrome ([facility] [address] Cervical stenosis of spine, GERD (gastroesophageal reflux disease), History of stomach ulcers, Hypertension, Hypothyroidism, Lumbar neuritis, Post laminectomy syndrome, Spinal deformity, and Thyroid disease. Past Surgical History:   has a past surgical history that includes back surgery; hernia repair (Bilateral); Breast surgery (Right); cervical laminectomy (2014); Mastectomy, radical; Hysterectomy, total abdominal; and Lumbar spine surgery (N/A, 2021). Additional Pertinent Hx:   Patient presents with decline in her mobility, LE weakness over the course of the last few months where she can barely even ambulate with the walker. Pt admitted to [facility] and underwent  lumbar laminectomy L2-S1 for cauda equina decompression on 22. Pt presents with B LE weakness, L LE > R LE. Pt admitted to rehab unit on 22. Restrictions/Precautions  Restrictions/Precautions: General Precautions;Surgical Protocols; Fall Risk;Up as Tolerated  Required Braces or Orthoses?: No  Position Activity Restriction  Spinal Precautions: lumbar spine surgery  Other position/activity restrictions: Activity as tolerated.   per MD discharge papers pt OK to shower 3-4 days post surgery            Vital Signs  Patient Currently in Pain: Denies                   Bed Mobility:   Bed Mobility  Bridging: Contact guard assistance (to help stabilize LLE)  Rolling: Stand by assistance;Rolling Left;Rolling Right  Supine to Sit: Stand by assistance (log roll technique)  Sit to Supine: Stand by assistance  Scooting: Stand by assistance  Comment: mat table and 2 pillows. Bed mobility  Bridging: Contact guard assistance (to help stabilize LLE)  Scooting: Stand by assistance    Transfers:  Sit to Stand: Contact guard assistance (cues for hand placment)  Stand to sit: Contact guard assistance (cues for safety)                 Ambulation 1  Surface: level tile  Device: Rolling Walker  Assistance: Contact guard assistance;Minimal assistance  Quality of Gait: improved posture with vcing today. Educated pt to focus on leading with knee\"marching type motion\" when advancing LLE. Pt tends to ER and circumduct LE forward. Improved foot clearance and technique with cues. Gait Deviations: Slow Sharon;Decreased step length;Decreased step height  Distance: 145ft  Comments: No loss of balance        Stairs/Curb  Stairs?: Yes  Stairs  # Steps : 10 (Pt also completed 4\" curb step using RW. x 2 -CGA)  Stairs Height:  (4\"/6\")  Rails: Bilateral  Device: No Device  Assistance: Contact guard assistance  Comment: Pt able to verbalized correct step to sequencing and demonstrate with CGA only. Again Ed pt to \"marching\" LLE up vs circumducting. BALANCE Posture: Fair  Sitting - Static: Good  Sitting - Dynamic: Fair;+  Standing - Static: Fair (rolling walker)  Standing - Dynamic: Fair;- (rolling walker)  Comments: standing balance assessed while using a RW    EXERCISES    Other exercises?: Yes  Other exercises 1: Seated LAQ's: 1# (B) [id number] hold  Other exercises 2: Standing bilateral LE exercises: 1#(B) x 10- focusing on posture-CGA  Other exercises 3: Nustep L5 at 5min. LE's only with L foot in t-band strap  Other exercises 4: Supine BLE ex's AROM only. Reps: 15. Rest breaks prn and cues to breath  Other Activities  Comment: rest breaks PRN. Activity Tolerance: Patient limited by endurance,Patient Tolerated treatment well        Current Treatment Recommendations: Strengthening,ROM,Stair training,Balance Training,Gait Training,Patient/Caregiver Education & Training,Equipment Evaluation, Education, & procurement,Neuromuscular Re-education,Functional Mobility Training,Endurance Training,Transfer Training,Safety Education & Training,Home Exercise Program    Conditions Requiring Skilled Therapeutic Intervention  Body structures, Functions, Activity limitations: Decreased functional mobility ; Decreased balance;Decreased endurance;Decreased strength; Increased pain;Decreased sensation  Assessment: Pt required Miryam for bed mobility for BLE progression and trunk stability. Pt ambulated 20 to 40 ft with CGA/min A using a RW and required increased time and effort to complete due to slow christine. Pt is limited by decreased BLE strength and would benefit from continued PT following discharge to address functional deficits. Treatment Diagnosis: Impaired fucntion  Prognosis: Good  Decision Making: Medium Complexity  REQUIRES PT FOLLOW UP: Yes  Discharge Recommendations: Patient would benefit from continued therapy after discharge;Home with assist PRN    Goals  Short term goals  Time Frame for Short term goals: 1 week  Short term goal 1: Pt will ambulate 200 feet with a RW and SBA to increase functional independence. Short term goal 2: Pt able to perform bed mobility at SBA  Short term goal 3: Pt will demonstrate good- standing balance with rolling walker to decrease fall risk. Short term goal 4: Pt will perform sit<>stand and pivot transfer with rolling walker SBA to increase functional independence. Short term goal 5: Pt will negotiate 5 stair with 2 handrail and CGA to allow the pt to enter prior living arrangements.   Long term goals  Time Frame for Long term goals : By DC  Long term goal 1: Pt able to perform bed mobility mod-I  Long term goal 2: Pt able to

## 2022-01-08 NOTE — PROGRESS NOTES
Physical Medicine & Rehabilitation  Progress Note    1/8/2022 11:55 AM     CC: Ambulatory and ADL dysfunction due to status post L2-S1 laminectomy    Subjective:   Feels well. Pain controlled. Positive BM yesterday and this AM as well. Feeling more her bladder. Voiding well  Completed Ancef , on cipro for UTI    ROS:  Denies fevers, chills, sweats. No chest pain, palpitations, lightheadedness. Denies coughing, wheezing or shortness of breath. Denies abdominal pain, nausea, diarrhea or constipation. No new areas of joint pain. Denies new areas of numbness or weakness. Denies new anxiety or depression issues. No new skin problems. Rehabilitation:   PT:      Transfers  Sit to Stand: Minimal Assistance  Stand to sit: Minimal Assistance  Stand pivot transfers: Minimal Assistance  Comment: Rolling walker. Good hand placement today. Erratic eccentric control; discussed using shoes for improved traction.      Ambulation 1  Surface: level tile  Device: Rolling Walker  Assistance: Contact guard assistance;Minimal assistance  Quality of Gait: Improved steadiness noted from previous description. Cues for increased L hip flexion at start of swing with G return and increased clearance for L foot. Pt with kyphotic posture and forward head posture. Gait Deviations: Slow Sharon; Shuffles;Decreased step height;Decreased step length  Distance: 80'x2 AM; 100' PM  Comments: No loss of balance     Stairs/Curb  Stairs?: Yes  Stairs  # Steps : 10 (x2)  Stairs Height:  (4\"/6\")  Rails: Bilateral  Device: No Device  Assistance: Contact guard assistance (+ 2nd person SBA for safety)  Comment: Reviewed sequencing before starting with good initial return; stepped down with R LE on initial return over step with no difficulty noted, but Pt returned to L LE descent after, cues given.  Decrease LLE foot clearance; G return to cues for increased L hip flexion.      Balance   Posture: Fair  Standing - Static: Fair (rolling walker)  Standing - Dynamic: Fair;- (rolling walker)      OT      Balance  Sitting Balance: Modified independent   Standing Balance: Contact guard assistance  Bed mobility  Rolling to Left: Stand by assistance  Supine to Sit: Minimal assistance  Scooting: Contact guard assistance  Comment: HOB slightly elevated. Transfers  Sit to stand: Contact guard assistance  Stand to sit: Contact guard assistance  Transfer Comments: good hand placement  Standing Balance  Time: AM: 2 minutes x2, 1 minute  Activity: AM: transfers, functional mobility, grooming tasks standing at sink  Comment: no LOB noted, UE support on RW or sink   Functional Mobility  Functional - Mobility Device: Rolling Walker  Activity: To/from bathroom  Assist Level: Contact guard assistance  Functional Mobility Comments: Unsteady gait, no LOB- difficulties with LLE (swings leg out, does not  foot). Pt requires max cues for entering/exiting shower. Quite fatigued, requiring w/c for mobility out of bathroom. Toilet Transfers  Toilet - Technique: Ambulating  Equipment Used: Grab bars  Toilet Transfer: Contact guard assistance  Toilet Transfers Comments: with RW, good hand placement this date  Shower Transfers  Shower - Transfer From: Leighton Caballero - Transfer Type: To and From  Shower - Transfer To: Transfer tub bench  Shower - Technique: Ambulating  Shower Transfers: Minimal assistance  Shower Transfers Comments: Max cues for technique and safety. 1 LOB with self recovery. Type of ROM/Therapeutic Exercise  Type of ROM/Therapeutic Exercise: Free weights (1# for L shoulder, 2# for R shoulder/elbow and L elbow)  Comment: PM: Pt engaged in BUE exercises to promote strength/endurance for functional use. 2 sets x10 reps. Increased time due to SOB, cued to take rest breaks as needed.   Exercises  Shoulder Flexion: X  Shoulder Extension: X  Elbow Flexion: X  Elbow Extension: X  Supination: X  Pronation: X  ADL  Equipment Provided: Reacher;Sock aid;Long-handled sponge  Feeding: Setup  Grooming: Setup  UE Bathing: Setup  LE Bathing: Moderate assistance  UE Dressing: Setup;Stand by assistance  LE Dressing: Moderate assistance  Toileting: Stand by assistance  Additional Comments: AM: OT facilitated pt's engagement in today's session. Eager to shower. Doffed LB clothing seated on toilet, using the sink/grab bar for support as needed. OT demonstrated and educated use of reacher and sock aid for LB dressing. Fair carry over.          Objective:  BP (!) 141/89   Pulse 91   Temp 98.5 °F (36.9 °C)   Resp 16   Ht 5' 1\" (1.549 m)   Wt 142 lb 6.4 oz (64.6 kg)   SpO2 97%   BMI 26.91 kg/m²  I Body mass index is 26.91 kg/m². I   Wt Readings from Last 1 Encounters:   22 142 lb 6.4 oz (64.6 kg)      Temp (24hrs), Av.4 °F (36.9 °C), Min:98.2 °F (36.8 °C), Max:98.5 °F (36.9 °C)         GEN: well developed, well nourished, no acute distress  HEENT: Normocephalic atraumatic, EOMI, mucous membranes pink and moist, face symmetric, no numbness on examination sensation intact to light touch and pinprick, no facial droop, no visual deficit, hearing intact, no ptosis no change  CV: RRR, no murmurs, rubs or gallops  PULM: CTAB, no rales or rhonchi. Respirations WNL and unlabored  ABD: Softer abdomen, positive bowel sounds, nontender, still mildly distended-seems less distended and softer today, positive bowel sounds  NEURO: A&O x3. Sensation intact to light touch. MSK: 4+/5 upper lower extremity  EXTREMITIES: No calf tenderness to palpation bilaterally. No edema BLEs  SKIN: warm dry and intact with good turgor, posterior laminectomy incision-increased erythema distally-similar to yesterday, no drainage no worsening  PSYCH: appropriately interactive. Affect WNL.          Medications   Scheduled Meds:   docusate sodium  200 mg Oral BID    senna  2 tablet Oral BID    bisacodyl  10 mg Rectal Daily    enoxaparin  40 mg SubCUTAneous Daily    baclofen  10 mg Oral TID  busPIRone  30 mg Oral BID    ciprofloxacin  500 mg Oral 2 times per day    dilTIAZem  180 mg Oral Daily    [Held by provider] ferrous sulfate  325 mg Oral BID     levothyroxine  88 mcg Oral Daily    methadone  10 mg Oral BID    pantoprazole  40 mg Oral Daily    polyethylene glycol  17 g Oral Daily     Continuous Infusions:  PRN Meds:. HYDROcodone-acetaminophen, ondansetron **OR** [DISCONTINUED] ondansetron, magnesium hydroxide, acetaminophen     Diagnostics:     CBC:   No results for input(s): WBC, RBC, HGB, HCT, MCV, RDW, PLT in the last 72 hours. BMP:   No results for input(s): NA, K, CL, CO2, PHOS, BUN, CREATININE, CA in the last 72 hours. BNP: No results for input(s): BNP in the last 72 hours. PT/INR: No results for input(s): PROTIME, INR in the last 72 hours. APTT: No results for input(s): APTT in the last 72 hours. CARDIAC ENZYMES: No results for input(s): CKMB, CKMBINDEX, TROPONINT in the last 72 hours. Invalid input(s): CKTOTAL;3  FASTING LIPID PANEL:  Lab Results   Component Value Date    CHOL 198 07/23/2020     (A) 07/23/2020    TRIG 96 07/23/2020     LIVER PROFILE: No results for input(s): AST, ALT, ALB, BILIDIR, BILITOT, ALKPHOS in the last 72 hours. I/O (24Hr): Intake/Output Summary (Last 24 hours) at 1/8/2022 1155  Last data filed at 1/8/2022 0601  Gross per 24 hour   Intake 480 ml   Output 1 ml   Net 479 ml       Glu last 24 hour  No results for input(s): POCGLU in the last 72 hours. No results for input(s): CLARITYU, COLORU, PHUR, SPECGRAV, PROTEINU, RBCUA, BLOODU, BACTERIA, NITRU, WBCUA, LEUKOCYTESUR, YEAST, GLUCOSEU, BILIRUBINUR in the last 72 hours. KUB 1/5/2022  Impression:        Gas-filled loops of small large bowel are identified with improving appearing   ileus or less likely partial obstruction.              Other Diagnoses/plan:     1.  Ambulatory and ADL dysfunction due to cauda equina status post lumbar laminectomy- continue rehab efforts, team conference today discharge plan 1/14  2. Spinal stenosis-monitor incision, improved erythema per RN , incision is open to air and looks good erythema-stable today perfect serve picture to Dr. Mercedes Vital of NS- Completed Ancef Tx, aware patient is on Cipro. He will stop by to see the patient 1/7.  3. -UTI -had urinary retention improved with bowel movement-monitor PVRs under 200, monitor daily and as needed, on Cipro through 1/13?  (Urine culture 1/1 sensitive to Cipro) per internal medicine at [place]. Ck's,, noted Urecholine discontinued  4. Neurogenic bowel/GI/constipation/ileus-resolving  5. Had been having BM regularly last two days, more continent with bladder , feeling her bladder more. 6. GERD-Protonix  7. Spasticity-baclofen  8. Chronic pain/history of lumbar surgeries and cervical spine surgery/postlaminectomy syndrome-on methadone-follows with Dr. Luciano Cardona- will follow up on discharge, currently on methadone 10 twice daily home dose changed to Norco every 8 hours. By family practice-advised patient to minimize  9. Hypertension-diltiazem  10. Depression-BuSpar  11. Hypothyroid-Synthroid  12. Anemia-on iron- hold due to constipation, hemoglobin 9.7  13. DVT prophylaxis-Lovenox  14.  Family practice-Dr. Deepthi Oro for medical management, appreciate medical management         Reanna Smalls MD

## 2022-01-09 PROCEDURE — 97112 NEUROMUSCULAR REEDUCATION: CPT

## 2022-01-09 PROCEDURE — 6370000000 HC RX 637 (ALT 250 FOR IP): Performed by: NURSE PRACTITIONER

## 2022-01-09 PROCEDURE — 6370000000 HC RX 637 (ALT 250 FOR IP): Performed by: PHYSICAL MEDICINE & REHABILITATION

## 2022-01-09 PROCEDURE — 1180000000 HC REHAB R&B

## 2022-01-09 PROCEDURE — 97116 GAIT TRAINING THERAPY: CPT

## 2022-01-09 PROCEDURE — 97110 THERAPEUTIC EXERCISES: CPT

## 2022-01-09 PROCEDURE — 97535 SELF CARE MNGMENT TRAINING: CPT

## 2022-01-09 PROCEDURE — 6370000000 HC RX 637 (ALT 250 FOR IP): Performed by: FAMILY MEDICINE

## 2022-01-09 PROCEDURE — 6360000002 HC RX W HCPCS: Performed by: NURSE PRACTITIONER

## 2022-01-09 PROCEDURE — 97530 THERAPEUTIC ACTIVITIES: CPT

## 2022-01-09 RX ADMIN — BUSPIRONE HYDROCHLORIDE 30 MG: 15 TABLET ORAL at 07:43

## 2022-01-09 RX ADMIN — BACLOFEN 10 MG: 10 TABLET ORAL at 13:49

## 2022-01-09 RX ADMIN — BACLOFEN 10 MG: 10 TABLET ORAL at 07:42

## 2022-01-09 RX ADMIN — DOCUSATE SODIUM 200 MG: 100 CAPSULE, LIQUID FILLED ORAL at 20:48

## 2022-01-09 RX ADMIN — METHADONE HYDROCHLORIDE 10 MG: 10 TABLET ORAL at 07:42

## 2022-01-09 RX ADMIN — BACLOFEN 10 MG: 10 TABLET ORAL at 20:48

## 2022-01-09 RX ADMIN — SENNOSIDES 17.2 MG: 8.6 TABLET, COATED ORAL at 07:42

## 2022-01-09 RX ADMIN — SENNOSIDES 17.2 MG: 8.6 TABLET, COATED ORAL at 20:48

## 2022-01-09 RX ADMIN — METHADONE HYDROCHLORIDE 10 MG: 10 TABLET ORAL at 20:48

## 2022-01-09 RX ADMIN — POLYETHYLENE GLYCOL 3350 17 G: 17 POWDER, FOR SOLUTION ORAL at 07:47

## 2022-01-09 RX ADMIN — BUSPIRONE HYDROCHLORIDE 30 MG: 15 TABLET ORAL at 20:53

## 2022-01-09 RX ADMIN — ENOXAPARIN SODIUM 40 MG: 100 INJECTION SUBCUTANEOUS at 07:41

## 2022-01-09 RX ADMIN — CIPROFLOXACIN 500 MG: 500 TABLET, FILM COATED ORAL at 20:53

## 2022-01-09 RX ADMIN — DILTIAZEM HYDROCHLORIDE 180 MG: 90 CAPSULE, EXTENDED RELEASE ORAL at 07:43

## 2022-01-09 RX ADMIN — HYDROCODONE BITARTRATE AND ACETAMINOPHEN 1 TABLET: 7.5; 325 TABLET ORAL at 17:59

## 2022-01-09 RX ADMIN — CIPROFLOXACIN 500 MG: 500 TABLET, FILM COATED ORAL at 07:43

## 2022-01-09 RX ADMIN — LEVOTHYROXINE SODIUM 88 MCG: 0.09 TABLET ORAL at 07:43

## 2022-01-09 RX ADMIN — PANTOPRAZOLE SODIUM 40 MG: 40 TABLET, DELAYED RELEASE ORAL at 07:42

## 2022-01-09 RX ADMIN — DOCUSATE SODIUM 200 MG: 100 CAPSULE, LIQUID FILLED ORAL at 07:42

## 2022-01-09 ASSESSMENT — PAIN DESCRIPTION - FREQUENCY: FREQUENCY: INTERMITTENT

## 2022-01-09 ASSESSMENT — PAIN - FUNCTIONAL ASSESSMENT: PAIN_FUNCTIONAL_ASSESSMENT: ACTIVITIES ARE NOT PREVENTED

## 2022-01-09 ASSESSMENT — PAIN SCALES - GENERAL
PAINLEVEL_OUTOF10: 6
PAINLEVEL_OUTOF10: 7
PAINLEVEL_OUTOF10: 6
PAINLEVEL_OUTOF10: 0
PAINLEVEL_OUTOF10: 3
PAINLEVEL_OUTOF10: 4

## 2022-01-09 ASSESSMENT — PAIN DESCRIPTION - ONSET: ONSET: GRADUAL

## 2022-01-09 ASSESSMENT — PAIN DESCRIPTION - PAIN TYPE
TYPE: CHRONIC PAIN
TYPE: CHRONIC PAIN

## 2022-01-09 ASSESSMENT — PAIN DESCRIPTION - PROGRESSION: CLINICAL_PROGRESSION: GRADUALLY IMPROVING

## 2022-01-09 ASSESSMENT — PAIN DESCRIPTION - DESCRIPTORS: DESCRIPTORS: THROBBING

## 2022-01-09 ASSESSMENT — PAIN DESCRIPTION - ORIENTATION: ORIENTATION: MID;LOWER

## 2022-01-09 ASSESSMENT — PAIN DESCRIPTION - LOCATION
LOCATION: BACK
LOCATION: BACK

## 2022-01-09 ASSESSMENT — PAIN DESCRIPTION - DIRECTION: RADIATING_TOWARDS: DOWN LEGS

## 2022-01-09 NOTE — PROGRESS NOTES
Physical Therapy  [facility]  Acute Rehabilitation Physical Therapy Progress Note    Date: 22  Patient Name: Ann Eason       Room: 3306/5168-63  MRN: [id number]   Account: [de-identified]   : 1960  (62 y.o.) Gender: female        Diagnosis: Lumbar stenosis with neurogenic claudication, L2-S1 laminectomy on 21 for cauda equina syndrome (Dr. Linnea Delacruz), Cervical stenosis with myelopathy, decreased BLE strength , L weaker than R,  Past Medical History:  has a past medical history of Anxiety, Arthritis, At maximum risk for fall, Cancer ([address] Cauda equina syndrome ([address] Cervical stenosis of spine, GERD (gastroesophageal reflux disease), History of stomach ulcers, Hypertension, Hypothyroidism, Lumbar neuritis, Post laminectomy syndrome, Spinal deformity, and Thyroid disease. Past Surgical History:   has a past surgical history that includes back surgery; hernia repair (Bilateral); Breast surgery (Right); cervical laminectomy (2014); Mastectomy, radical; Hysterectomy, total abdominal; and Lumbar spine surgery (N/A, 2021). Additional Pertinent Hx:   Patient presents with decline in her mobility, LE weakness over the course of the last few months where she can barely even ambulate with the walker. Pt admitted to [facility] and underwent  lumbar laminectomy L2-S1 for cauda equina decompression on 22. Pt presents with B LE weakness, L LE > R LE. Pt admitted to rehab unit on 22. Restrictions/Precautions  Restrictions/Precautions: General Precautions;Surgical Protocols; Fall Risk;Up as Tolerated  Required Braces or Orthoses?: No  Position Activity Restriction  Spinal Precautions: lumbar spine surgery  Other position/activity restrictions: Activity as tolerated.   per MD discharge papers pt OK to shower 3-4 days post surgery    Bed Mobility:   Bed Mobility  Bridging: Contact guard assistance (to help stabilize LLE)  Rolling: Stand by assistance  Sit to Supine: Stand by assistance  Scooting: Stand by assistance  Comment: mat, wedge( for comfort- pt sleeps in a recliner at home) 2 pillows (is able to complete from flat surface)  Bed mobility  Bridging: Contact guard assistance (to help stabilize LLE)  Scooting: Stand by assistance    Transfers:  Sit to Stand: Stand by assistance;Contact guard assistance  Stand to sit: Stand by assistance        Squat Pivot Transfers: Contact guard assistance (w/c<>mat table no AD)        Ambulation 1  Surface: level tile  Device: Rolling Walker  Assistance: Contact guard assistance  Quality of Gait: improved posture and gait technique today. Less circumduction and improved L foot clearance overall. Does scuff/drag on floor with fatigue, however corrects with vc's  Gait Deviations: Slow Sharon;Decreased step height  Distance: 182ft  Ambulation 2  Surface - 2: level tile;ramp  Device 2: Rolling Walker  Assistance 2: Contact guard assistance  Quality of Gait 2: Decrease LLE foot clearance with slight circumduction. Cues to correct. (instability of L ankle noted)  Gait Deviations: Slow Sharon;Decreased step height  Distance: 160ft     Stairs/Curb  Stairs?: Yes  Stairs  # Steps : 15  Rails: Bilateral  Device: No Device  Assistance: Contact guard assistance  Comment: Pt able to verbalized correct step to sequencing and demonstrate with CGA only. Again Ed pt to \"marching\" LLE up vs circumducting.           BALANCE Posture: Fair  Sitting - Static: Good  Sitting - Dynamic: Fair;+  Standing - Static: Fair (rolling walker)  Standing - Dynamic: Fair;- (rolling walker)  Comments: standing balance assessed while using a RW    EXERCISES    Other exercises?: Yes  Other exercises 1: standing RLE med ball tap to fatigue (working LLE standing joseph/strength)  Other exercises 2: LLE step up F/L on 4\" step x 10  Other exercises 3: RLE step down working on eccentric L quad control x 10  Other exercises 4: sit<>stand from EOM 3 different ways: x 5ea  Other exercises 5: Supine: (B) LE ex: 1.5# x 15 ea  Other exercises 6: Bridges x 10- CGA to stabilize LLE  Other Activities  Comment: rest breaks PRN. Activity Tolerance: Patient limited by endurance,Patient Tolerated treatment well     Current Treatment Recommendations: Strengthening,ROM,Stair training,Balance Training,Gait Training,Patient/Caregiver Education & Training,Equipment Evaluation, Education, & procurement,Neuromuscular Re-education,Functional Mobility Training,Endurance Training,Transfer Training,Safety Education & Training,Home Exercise Program    Conditions Requiring Skilled Therapeutic Intervention  Body structures, Functions, Activity limitations: Decreased functional mobility ; Decreased balance;Decreased endurance;Decreased strength; Increased pain;Decreased sensation  Assessment: Pt required Miryam for bed mobility for BLE progression and trunk stability. Pt ambulated 20 to 40 ft with CGA/min A using a RW and required increased time and effort to complete due to slow christine. Pt is limited by decreased BLE strength and would benefit from continued PT following discharge to address functional deficits. Treatment Diagnosis: Impaired fucntion  Prognosis: Good  Decision Making: Medium Complexity  REQUIRES PT FOLLOW UP: Yes  Discharge Recommendations: Patient would benefit from continued therapy after discharge;Home with assist PRN    Goals  Short term goals  Time Frame for Short term goals: 1 week  Short term goal 1: Pt will ambulate 200 feet with a RW and SBA to increase functional independence. Short term goal 2: Pt able to perform bed mobility at SBA  Short term goal 3: Pt will demonstrate good- standing balance with rolling walker to decrease fall risk. Short term goal 4: Pt will perform sit<>stand and pivot transfer with rolling walker SBA to increase functional independence.   Short term goal 5: Pt will negotiate 5 stair with 2 handrail and CGA to allow the pt to enter prior living arrangements. Long term goals  Time Frame for Long term goals : By DC  Long term goal 1: Pt able to perform bed mobility mod-I  Long term goal 2: Pt able to transfer at mod-I  Long term goal 3: Pt able to ambulate with rolling walker distance of 150 ft, mod-I, level surfaces  Long term goal 4:  Pt able to ambulate on incline surface at SBA with rolling walker. Long term goal 5: Pt able to get up and down curb steps with UE support, CGA  Long term goal 6: Improve 2MWT distance to 100 ft to improve gait speed and overall fucntion. Long term goal 7: Pt able to go up and 12 steps with 2 UE support at SBA to improve B LE strength.        01/09/22 0903 01/09/22 1427   PT Individual Minutes   Time In 0901 1425   Time Out 1001 1500   Minutes 60 35       Electronically signed by Andrew Castorena PTA on 1/9/22 at 4:19 PM EST

## 2022-01-09 NOTE — PLAN OF CARE
Problem: Pain:  Goal: Pain level will decrease  Description: Pain level will decrease  1/9/2022 0921 by Pedro Pablo Bentley RN  Outcome: Ongoing  1/9/2022 0005 by Martha Coffman II, LPN  Outcome: Ongoing  Goal: Control of acute pain  Description: Control of acute pain  1/9/2022 0921 by Pedro Pablo Bentley RN  Outcome: Ongoing  1/9/2022 0005 by Martha Coffman II, LPN  Outcome: Ongoing  Goal: Control of chronic pain  Description: Control of chronic pain  1/9/2022 0921 by Pedro Pablo Bentley RN  Outcome: Ongoing  1/9/2022 0005 by Martha Coffman II, LPN  Outcome: Ongoing     Problem: Falls - Risk of:  Goal: Will remain free from falls  Description: Will remain free from falls  1/9/2022 0921 by Pedro Pablo Bentley RN  Outcome: Ongoing  1/9/2022 0005 by Martha Coffman II, LPN  Outcome: Ongoing  Goal: Absence of physical injury  Description: Absence of physical injury  1/9/2022 0921 by Pedro Pablo Bentley RN  Outcome: Ongoing  1/9/2022 0005 by Martha Coffman II, LPN  Outcome: Ongoing     Problem: Skin Integrity:  Goal: Will show no infection signs and symptoms  Description: Will show no infection signs and symptoms  1/9/2022 0921 by Pedro Pablo Bentley RN  Outcome: Ongoing  1/9/2022 0005 by Martha Coffman II, LPN  Outcome: Ongoing  Goal: Absence of new skin breakdown  Description: Absence of new skin breakdown  1/9/2022 0921 by Pedro Pablo Bentley RN  Outcome: Ongoing  1/9/2022 0005 by Martha Coffman II, LPN  Outcome: Ongoing     Problem: Musculor/Skeletal Functional Status  Goal: Highest potential functional level  1/9/2022 0921 by Pedro Pablo Bentley RN  Outcome: Ongoing  1/9/2022 0005 by Martha Coffman II, LPN  Outcome: Ongoing  Goal: Absence of falls  1/9/2022 0921 by Pedro Pablo Bentley RN  Outcome: Ongoing  1/9/2022 0005 by Adryan Vazquez LPN  Outcome: Ongoing     Problem: Musculor/Skeletal Functional Status  Goal: Highest potential functional level  1/9/2022 0921 by Pedro Pablo Bentley RN  Outcome: Ongoing  1/9/2022 0005 by Martha Hickory II, LPN  Outcome: Ongoing  Goal: Absence of falls  1/9/2022 0921 by Neetu Langford RN  Outcome: Ongoing  1/9/2022 0005 by Samuel Ko II, LPN  Outcome: Ongoing     Problem: Nutrition  Goal: Optimal nutrition therapy  1/9/2022 0921 by Neetu Langford RN  Outcome: Ongoing  1/9/2022 0005 by Key Sam LPN  Outcome: Ongoing

## 2022-01-09 NOTE — PROGRESS NOTES
Sore    Objective  Cognition  Overall Cognitive Status: WFL  Perception  Overall Perceptual Status: WFL  Balance  Sitting Balance: Modified independent   Standing Balance: Stand by assistance (1-2 UE support req)  Transfers  Stand Step Transfers: Stand by assistance (with R/W)  Sit to stand: Stand by assistance  Stand to sit: Stand by assistance  Transfer Comments: good hand placement noted  Standing Balance  Time: AM: 1-2 min x5; PM: 2-3 min x2, 1 min x2  Activity: AM: functional mobility/transfers in room/bathroom, self care tasks; PM: dynamic standing to participate in weighted bag toss, close SBA with no LOB noted, VCs for safety with good carryover, using reacher to retrieve bags from floor level, no LOB when reaching/bending outside [place], good safety awareness with reaching and R/W positioning for retrieval, task to address safety/balance throughout a functional task; functional mobility in room and OT gym with R/W, SBA with no LOB noted  Comment: no LOB noted, VCs for UE support with dynamic tasks-good carryover noted  Functional Mobility  Functional - Mobility Device: Rolling Walker  Activity: To/from bathroom  Assist Level: Stand by assistance  Functional Mobility Comments: slight ataxic gait noted/unsteady but no gross LOB noted  Shower Transfers  Shower - Transfer From: Walker  Shower - Transfer Type: To and From  Shower - Transfer To:  Transfer tub bench  Shower - Technique: Ambulating  Shower Transfers: Contact Guard  Shower Transfers Comments: with R/W, cuing for tech crossing threshold, no LOB noted     Type of ROM/Therapeutic Exercise  Type of ROM/Therapeutic Exercise: Free weights (BUE's, 20 reps, 2# RUE, 1# LUE)  Comment: pt engaged in BUE ex's to support mobility/transfers and overall endurance, rest breaks req but demo'd good tolerance, all within precautions  Exercises  Scapular Protraction: x  Scapular Retraction: x  Shoulder Flexion: x  Shoulder Extension: x  Horizontal ABduction: x  Horizontal ADduction: x  Elbow Flexion: x  Elbow Extension: x                       ADL  Equipment Provided: Reacher;Long-handled sponge  Feeding: Setup  Grooming: Setup  UE Bathing: Setup  LE Bathing: Stand by assistance  UE Dressing: Setup  LE Dressing: Minimal assistance;Setup (A TEDs only, SBA standing, did not req sock aid this date)  Toileting: None          Assessment  Performance deficits / Impairments: Decreased functional mobility ; Decreased ADL status; Decreased endurance;Decreased high-level IADLs;Decreased balance;Decreased coordination;Decreased sensation;Decreased posture;Decreased ROM; Decreased strength;Decreased fine motor control  Prognosis: Good  Discharge Recommendations: Patient would benefit from continued therapy after discharge  Activity Tolerance: Patient Tolerated treatment well  Safety Devices in place: Yes  Type of devices: Nurse notified; Left in chair;Call light within reach  Equipment Recommendations  Equipment Needed: Yes  Hand Held Shower: x  Transfer Tub Bench: x  Grab bars: x  Reacher: x  Sock Aid: X       Patient Education: DC planning, DME, ADL tasks, adaptive strategies, strengthening   Patient Goals   Patient goals : walk safely to be home alone while spouse at work  Learner:patient  Method: demonstration and explanation       Outcome: demonstrated understanding        Plan  Plan  Times per week: 5-7  Times per day: Twice a day  Current Treatment Recommendations: Strengthening,Patient/Caregiver Education & Training,Home Management Training,Equipment Evaluation, Education, & procurement,Balance Training,Functional Mobility Training,Endurance Training,Safety Education & Training,Self-Care / ADL  Patient Goals   Patient goals : walk safely to be home alone while spouse at work  Short term goals  Time Frame for Short term goals: 1 week  Short term goal 1: min assist LE self care  Short term goal 2: CGA amb to obtain set up for adls with good walker technique  Short term goal 3: joseph 8-9 min stand , ambulate for adls with RW and CGA  Short term goal 4: joseph 10 reps x 2 B shld ex with 1 lb weight on L and 2 lb weight on R  and 20 reps x 2 elbow ex with 2 lb wt on L and 3 lb on R for work joseph and UE strengthening. Short term goal 5: joseph repetitive  ex LUE with mod resistance for hand strengthening. Long term goals  Time Frame for Long term goals : by discharge  Long term goal 1: mod indep self care  Long term goal 2: mod indep amb to obtain set up for adls with good walker safety  Long term goal 3: joseph 11-13 min stand mod indep during adls  Long term goal 4: mod assist with transfer to tub to use tub seat vs remove shower doors for extended tub bench transfer with min assist.  Long term goal 5: mod indep with simple advanced adls with good walker safety and good balance. Long term goals 6: indep with BUE HEP for strengthening and coordination. Long term goal 7: increase L hand  strength to 40 pounds.         01/09/22 1300 01/09/22 1517   OT Individual Minutes   Time In 9123 9851   Time Out 1334 1107   Minutes 34 52   OT Concurrent Minutes   Time In 9001  --    Time Out 1339  --    Minutes 5  --      Electronically signed by Margarito Klinefelter, COTA/L on 1/9/22 at 3:25 PM EST

## 2022-01-09 NOTE — PLAN OF CARE
falls  1/9/2022 0005 by Kieran Alfred II, LPN  Outcome: Ongoing  1/8/2022 1121 by Iliana Cabrera RN  Outcome: Ongoing     Problem: Nutrition  Goal: Optimal nutrition therapy  1/9/2022 0005 by José Miguel Meneses LPN  Outcome: Ongoing  1/8/2022 1121 by Iliana Cabrera RN  Outcome: Ongoing

## 2022-01-09 NOTE — FLOWSHEET NOTE
Patient shared thoughts on her heart and displayed positive attitude towards her rehab and going home. She appreciated writer's listening presence and welcomed prayer. 01/09/22 1305   Encounter Summary   Services provided to: Patient   Referral/Consult From: [facility]   (1-9-22)   Complexity of Encounter Moderate   Length of Encounter 30 minutes   Spiritual Assessment Completed Yes   Spiritual/Adventist   Type Spiritual support   Assessment Calm; Approachable   Intervention Active listening;Explored feelings, thoughts, concerns;Explored coping resources;Prayer;Sustaining presence/ Ministry of presence; Discussed meaning/purpose;Discussed illness/injury and it's impact   Outcome Expressed gratitude;Engaged in conversation;Expressed feelings/needs/concerns;Coping;Receptive; Hopeful

## 2022-01-09 NOTE — PROGRESS NOTES
Physical Medicine & Rehabilitation  Progress Note    1/9/2022 12:14 PM     CC: Ambulatory and ADL dysfunction due to status post L2-S1 laminectomy    Subjective:   Feels well. Pain controlled. Positive BM this AM. Feeling more her bladder. Voiding well      ROS:  Denies fevers, chills, sweats. No chest pain, palpitations, lightheadedness. Denies coughing, wheezing or shortness of breath. Denies abdominal pain, nausea, diarrhea or constipation. No new areas of joint pain. Denies new areas of numbness or weakness. Denies new anxiety or depression issues. No new skin problems. Rehabilitation:   PT:      Transfers  Sit to Stand: Minimal Assistance  Stand to sit: Minimal Assistance  Stand pivot transfers: Minimal Assistance  Comment: Rolling walker. Good hand placement today. Erratic eccentric control; discussed using shoes for improved traction.      Ambulation 1  Surface: level tile  Device: Rolling Walker  Assistance: Contact guard assistance;Minimal assistance  Quality of Gait: Improved steadiness noted from previous description. Cues for increased L hip flexion at start of swing with G return and increased clearance for L foot. Pt with kyphotic posture and forward head posture. Gait Deviations: Slow Sharon; Shuffles;Decreased step height;Decreased step length  Distance: 80'x2 AM; 100' PM  Comments: No loss of balance     Stairs/Curb  Stairs?: Yes  Stairs  # Steps : 10 (x2)  Stairs Height:  (4\"/6\")  Rails: Bilateral  Device: No Device  Assistance: Contact guard assistance (+ 2nd person SBA for safety)  Comment: Reviewed sequencing before starting with good initial return; stepped down with R LE on initial return over step with no difficulty noted, but Pt returned to L LE descent after, cues given.  Decrease LLE foot clearance; G return to cues for increased L hip flexion.      Balance   Posture: Fair  Standing - Static: Fair (rolling walker)  Standing - Dynamic: Fair;- (rolling walker)      Exercises Other exercises?: Yes  Other exercises 1: Seated bilateral LE exercises, 15 reps each, active ROM & green (minimal +) resistance band  Other exercises 2: Standing bilateral LE exercises, 12-15x each, bilateral UE support + CGA. (Fatigued easily, from seated break p each ex. to each side)  Other exercises 3: Nustep L3 at 5min. Seat at 8 and arms at 9. Other exercises 5: Sit <> stand x5 with R LE on 6\" step, Mod A initially progressing to [address] with UE support. Other exercises 6: Stand pivot transfers x2     Activity Tolerance: Patient limited by endurance,Patient Tolerated treatment well      PT Equipment Recommendations  Equipment Needed: No     Current Treatment Recommendations: Strengthening,ROM,Stair training,Balance Training,Gait Training,Patient/Caregiver Education & Training,Equipment Evaluation, Education, & procurement,Neuromuscular Re-education,Functional Mobility Training,Endurance Training,Transfer Training,Safety Education & Training,Home Exercise Program     Conditions Requiring Skilled Therapeutic Intervention  Body structures, Functions, Activity limitations: Decreased functional mobility ; Decreased balance;Decreased endurance;Decreased strength; Increased pain;Decreased sensation  REQUIRES PT FOLLOW UP: Yes  Discharge Recommendations: Patient would benefit from continued therapy after discharge;Home with assist PRN    OT    Balance  Sitting Balance: Modified independent   Standing Balance: Contact guard assistance  Bed mobility  Rolling to Left: Stand by assistance  Supine to Sit: Minimal assistance  Scooting: Contact guard assistance  Comment: HOB slightly elevated.    Transfers  Sit to stand: Contact guard assistance  Stand to sit: Contact guard assistance  Transfer Comments: good hand placement  Standing Balance  Time: AM: 2 minutes x2, 1 minute  Activity: AM: transfers, functional mobility, grooming tasks standing at sink  Comment: no LOB noted, UE support on RW or sink   Functional Mobility  Functional - Mobility Device: Rolling Walker  Activity: To/from bathroom  Assist Level: Contact guard assistance  Functional Mobility Comments: Unsteady gait, no LOB- difficulties with LLE (swings leg out, does not  foot). Pt requires max cues for entering/exiting shower. Quite fatigued, requiring w/c for mobility out of bathroom. Toilet Transfers  Toilet - Technique: Ambulating  Equipment Used: Grab bars  Toilet Transfer: Contact guard assistance  Toilet Transfers Comments: with RW, good hand placement this date  Shower Transfers  Shower - Transfer From: Sari Joe - Transfer Type: To and From  Shower - Transfer To: Transfer tub bench  Shower - Technique: Ambulating  Shower Transfers: Minimal assistance  Shower Transfers Comments: Max cues for technique and safety. 1 LOB with self recovery. Type of ROM/Therapeutic Exercise  Type of ROM/Therapeutic Exercise: Free weights (1# for L shoulder, 2# for R shoulder/elbow and L elbow)  Comment: PM: Pt engaged in BUE exercises to promote strength/endurance for functional use. 2 sets x10 reps. Increased time due to SOB, cued to take rest breaks as needed. Exercises  Shoulder Flexion: X  Shoulder Extension: X  Elbow Flexion: X  Elbow Extension: X  Supination: X  Pronation: X  ADL  Equipment Provided: Reacher;Sock aid;Long-handled sponge  Feeding: Setup  Grooming: Setup  UE Bathing: Setup  LE Bathing: Moderate assistance  UE Dressing: Setup;Stand by assistance  LE Dressing: Moderate assistance  Toileting: Stand by assistance  Additional Comments: AM: OT facilitated pt's engagement in today's session. Eager to shower. Doffed LB clothing seated on toilet, using the sink/grab bar for support as needed. OT demonstrated and educated use of reacher and sock aid for LB dressing.  Fair carry over.        Objective:  BP (!) 146/92   Pulse 96   Temp 98.8 °F (37.1 °C)   Resp 16   Ht 5' 1\" (1.549 m)   Wt 142 lb 6.4 oz (64.6 kg)   SpO2 96%   BMI 26.91 kg/m²  I Body mass index is 26.91 kg/m². I   Wt Readings from Last 1 Encounters:   22 142 lb 6.4 oz (64.6 kg)      Temp (24hrs), Av.9 °F (37.2 °C), Min:98.8 °F (37.1 °C), Max:98.9 °F (37.2 °C)         GEN: well developed, well nourished, no acute distress  HEENT: Normocephalic atraumatic, EOMI, mucous membranes pink and moist, face symmetric, no numbness on examination sensation intact to light touch and pinprick, no facial droop, no visual deficit, hearing intact, no ptosis no change  CV: RRR, no murmurs, rubs or gallops  PULM: CTAB, no rales or rhonchi. Respirations WNL and unlabored  ABD: Softer abdomen, positive bowel sounds, nontender, still mildly distended-seems less distended and softer today, positive bowel sounds  NEURO: A&O x3. Sensation intact to light touch. MSK: 4+/5 upper lower extremity  EXTREMITIES: No calf tenderness to palpation bilaterally. No edema BLEs  SKIN: warm dry and intact with good turgor, posterior laminectomy incision-increased erythema distally-similar to yesterday, no drainage no worsening  PSYCH: appropriately interactive. Affect WNL. Medications   Scheduled Meds:   docusate sodium  200 mg Oral BID    senna  2 tablet Oral BID    bisacodyl  10 mg Rectal Daily    enoxaparin  40 mg SubCUTAneous Daily    baclofen  10 mg Oral TID    busPIRone  30 mg Oral BID    ciprofloxacin  500 mg Oral 2 times per day    dilTIAZem  180 mg Oral Daily    [Held by provider] ferrous sulfate  325 mg Oral BID     levothyroxine  88 mcg Oral Daily    methadone  10 mg Oral BID    pantoprazole  40 mg Oral Daily    polyethylene glycol  17 g Oral Daily     Continuous Infusions:  PRN Meds:. HYDROcodone-acetaminophen, ondansetron **OR** [DISCONTINUED] ondansetron, magnesium hydroxide, acetaminophen     Diagnostics:     CBC:   No results for input(s): WBC, RBC, HGB, HCT, MCV, RDW, PLT in the last 72 hours.   BMP:   No results for input(s): NA, K, CL, CO2, PHOS, BUN, CREATININE, CA in the last 72 hours. BNP: No results for input(s): BNP in the last 72 hours. PT/INR: No results for input(s): PROTIME, INR in the last 72 hours. APTT: No results for input(s): APTT in the last 72 hours. CARDIAC ENZYMES: No results for input(s): CKMB, CKMBINDEX, TROPONINT in the last 72 hours. Invalid input(s): CKTOTAL;3  FASTING LIPID PANEL:  Lab Results   Component Value Date    CHOL 198 07/23/2020     (A) 07/23/2020    TRIG 96 07/23/2020     LIVER PROFILE: No results for input(s): AST, ALT, ALB, BILIDIR, BILITOT, ALKPHOS in the last 72 hours. I/O (24Hr): No intake or output data in the 24 hours ending 01/09/22 1214    Glu last 24 hour  No results for input(s): POCGLU in the last 72 hours. No results for input(s): CLARITYU, COLORU, PHUR, SPECGRAV, PROTEINU, RBCUA, BLOODU, BACTERIA, NITRU, WBCUA, LEUKOCYTESUR, YEAST, GLUCOSEU, BILIRUBINUR in the last 72 hours. KUB 1/5/2022  Impression:        Gas-filled loops of small large bowel are identified with improving appearing   ileus or less likely partial obstruction.              Other Diagnoses/plan:     1. Ambulatory and ADL dysfunction due to cauda equina status post lumbar laminectomy- continue rehab efforts, team conference today discharge plan 1/14  2. Spinal stenosis-monitor incision, improved erythema per RN , incision is open to air and looks good erythema-stable today perfect serve picture to Dr. Lexi Campo of [facility]- Completed Ancef Tx, aware patient is on Cipro. He will stop by to see the patient 1/7.  3. -UTI -had urinary retention improved with bowel movement-monitor PVRs under 200, monitor daily and as needed, on Cipro through 1/13?  (Urine culture 1/1 sensitive to Cipro) per internal medicine at [address] [place] [address]. Ck's,, noted Urecholine discontinued  4. Neurogenic bowel/GI/constipation/ileus-resolving  5.  Had been having BM regularly last two days, more continent with bladder , feeling her bladder more. 6. GERD-Protonix  7. Spasticity-baclofen  8. Chronic pain/history of lumbar surgeries and cervical spine surgery/postlaminectomy syndrome-on methadone-follows with Dr. Chapo Yeboah- will follow up on discharge, currently on methadone 10 twice daily home dose changed to Norco every 8 hours. By family practice-advised patient to minimize  9. Hypertension-diltiazem  10. Depression-BuSpar  11. Hypothyroid-Synthroid  12. Anemia-on iron- hold due to constipation, hemoglobin 9.7  13. DVT prophylaxis-Lovenox  14.  Family practice-Dr. Gunner Gay for medical management, appreciate medical management         Franc Krishnan MD

## 2022-01-09 NOTE — PLAN OF CARE
Problem: Pain:  Goal: Pain level will decrease  Description: Pain level will decrease  1/9/2022 0921 by Pedro Pablo Bentley RN  Outcome: Ongoing  1/9/2022 0921 by Pedro Pablo Bentley RN  Outcome: Ongoing  1/9/2022 0005 by Martha Coffman II, LPN  Outcome: Ongoing  Goal: Control of acute pain  Description: Control of acute pain  1/9/2022 0921 by Pedro Pablo Bentley RN  Outcome: Ongoing  1/9/2022 0921 by Pedro Pablo Bentley RN  Outcome: Ongoing  1/9/2022 0005 by Martha Coffman II, LPN  Outcome: Ongoing  Goal: Control of chronic pain  Description: Control of chronic pain  1/9/2022 0921 by Pedro Pablo Bentley RN  Outcome: Ongoing  1/9/2022 0921 by Pedro Pablo Bentley RN  Outcome: Ongoing  1/9/2022 0005 by Martha Coffman II, LPN  Outcome: Ongoing     Problem: Falls - Risk of:  Goal: Will remain free from falls  Description: Will remain free from falls  1/9/2022 0921 by Pedro Pablo Bentley RN  Outcome: Ongoing  1/9/2022 0921 by Pedro Pablo Bentley RN  Outcome: Ongoing  1/9/2022 0005 by Martha Coffman II, LPN  Outcome: Ongoing  Goal: Absence of physical injury  Description: Absence of physical injury  1/9/2022 0921 by Pedro Pablo Bentley RN  Outcome: Ongoing  1/9/2022 0921 by Pedro Pablo Bentley RN  Outcome: Ongoing  1/9/2022 0005 by Martha Coffman II, LPN  Outcome: Ongoing     Problem: Skin Integrity:  Goal: Will show no infection signs and symptoms  Description: Will show no infection signs and symptoms  1/9/2022 0921 by Pedro Pablo Bentley RN  Outcome: Ongoing  1/9/2022 0921 by Pedro Pablo Bentley RN  Outcome: Ongoing  1/9/2022 0005 by Martha Coffman II, LPN  Outcome: Ongoing  Goal: Absence of new skin breakdown  Description: Absence of new skin breakdown  1/9/2022 0921 by Pedro Pablo Bentley RN  Outcome: Ongoing  1/9/2022 0921 by Pedro Pablo Bentley RN  Outcome: Ongoing  1/9/2022 0005 by Adryan Vazquez LPN  Outcome: Ongoing     Problem: Musculor/Skeletal Functional Status  Goal: Highest potential functional level  1/9/2022 0921 by Pedro Pablo Bentley RN  Outcome: Ongoing  1/9/2022 0921 by Pedro Pablo Bentley RN  Outcome: Ongoing  1/9/2022 0005 by Pablito Cain II, LPN  Outcome: Ongoing  Goal: Absence of falls  1/9/2022 0921 by Hector Barron RN  Outcome: Ongoing  1/9/2022 0921 by Hector Barron RN  Outcome: Ongoing  1/9/2022 0005 by Pablito Cain II, LPN  Outcome: Ongoing     Problem: Musculor/Skeletal Functional Status  Goal: Highest potential functional level  1/9/2022 0921 by Hector Barron RN  Outcome: Ongoing  1/9/2022 0921 by Hector Barron RN  Outcome: Ongoing  1/9/2022 0005 by Pablito Cain II, LPN  Outcome: Ongoing  Goal: Absence of falls  1/9/2022 0921 by Hector Barron RN  Outcome: Ongoing  1/9/2022 0921 by Hector Barron RN  Outcome: Ongoing  1/9/2022 0005 by Pabltio Cain II, LPN  Outcome: Ongoing     Problem: Nutrition  Goal: Optimal nutrition therapy  1/9/2022 0921 by Hector Barron RN  Outcome: Ongoing  1/9/2022 0921 by Hector Barron RN  Outcome: Ongoing  1/9/2022 0005 by Soha Gould LPN  Outcome: Ongoing

## 2022-01-10 PROCEDURE — 97530 THERAPEUTIC ACTIVITIES: CPT

## 2022-01-10 PROCEDURE — 97535 SELF CARE MNGMENT TRAINING: CPT

## 2022-01-10 PROCEDURE — 6370000000 HC RX 637 (ALT 250 FOR IP): Performed by: NURSE PRACTITIONER

## 2022-01-10 PROCEDURE — 1180000000 HC REHAB R&B

## 2022-01-10 PROCEDURE — 6370000000 HC RX 637 (ALT 250 FOR IP): Performed by: PHYSICAL MEDICINE & REHABILITATION

## 2022-01-10 PROCEDURE — 6360000002 HC RX W HCPCS: Performed by: NURSE PRACTITIONER

## 2022-01-10 PROCEDURE — 97110 THERAPEUTIC EXERCISES: CPT

## 2022-01-10 PROCEDURE — 99232 SBSQ HOSP IP/OBS MODERATE 35: CPT | Performed by: FAMILY MEDICINE

## 2022-01-10 PROCEDURE — 97116 GAIT TRAINING THERAPY: CPT

## 2022-01-10 PROCEDURE — 99232 SBSQ HOSP IP/OBS MODERATE 35: CPT | Performed by: PHYSICAL MEDICINE & REHABILITATION

## 2022-01-10 RX ADMIN — DOCUSATE SODIUM 200 MG: 100 CAPSULE, LIQUID FILLED ORAL at 07:39

## 2022-01-10 RX ADMIN — DILTIAZEM HYDROCHLORIDE 180 MG: 90 CAPSULE, EXTENDED RELEASE ORAL at 07:41

## 2022-01-10 RX ADMIN — CIPROFLOXACIN 500 MG: 500 TABLET, FILM COATED ORAL at 07:41

## 2022-01-10 RX ADMIN — BACLOFEN 10 MG: 10 TABLET ORAL at 07:39

## 2022-01-10 RX ADMIN — METHADONE HYDROCHLORIDE 10 MG: 10 TABLET ORAL at 07:40

## 2022-01-10 RX ADMIN — PANTOPRAZOLE SODIUM 40 MG: 40 TABLET, DELAYED RELEASE ORAL at 07:39

## 2022-01-10 RX ADMIN — SENNOSIDES 17.2 MG: 8.6 TABLET, COATED ORAL at 07:39

## 2022-01-10 RX ADMIN — ENOXAPARIN SODIUM 40 MG: 100 INJECTION SUBCUTANEOUS at 07:41

## 2022-01-10 RX ADMIN — POLYETHYLENE GLYCOL 3350 17 G: 17 POWDER, FOR SOLUTION ORAL at 07:39

## 2022-01-10 RX ADMIN — BUSPIRONE HYDROCHLORIDE 30 MG: 15 TABLET ORAL at 07:42

## 2022-01-10 RX ADMIN — LEVOTHYROXINE SODIUM 88 MCG: 0.09 TABLET ORAL at 07:42

## 2022-01-10 RX ADMIN — CIPROFLOXACIN 500 MG: 500 TABLET, FILM COATED ORAL at 22:13

## 2022-01-10 RX ADMIN — BACLOFEN 10 MG: 10 TABLET ORAL at 14:59

## 2022-01-10 RX ADMIN — METHADONE HYDROCHLORIDE 10 MG: 10 TABLET ORAL at 20:01

## 2022-01-10 RX ADMIN — BACLOFEN 10 MG: 10 TABLET ORAL at 20:00

## 2022-01-10 RX ADMIN — BUSPIRONE HYDROCHLORIDE 30 MG: 15 TABLET ORAL at 22:13

## 2022-01-10 ASSESSMENT — PAIN SCALES - GENERAL
PAINLEVEL_OUTOF10: 6
PAINLEVEL_OUTOF10: 0
PAINLEVEL_OUTOF10: 0
PAINLEVEL_OUTOF10: 6
PAINLEVEL_OUTOF10: 0
PAINLEVEL_OUTOF10: 5

## 2022-01-10 ASSESSMENT — PAIN DESCRIPTION - PAIN TYPE: TYPE: CHRONIC PAIN

## 2022-01-10 ASSESSMENT — PAIN DESCRIPTION - LOCATION: LOCATION: BACK

## 2022-01-10 ASSESSMENT — PAIN DESCRIPTION - ORIENTATION: ORIENTATION: MID;LOWER

## 2022-01-10 NOTE — PROGRESS NOTES
Comprehensive Nutrition Assessment    Type and Reason for Visit:  Reassess    Nutrition Recommendations/Plan: Continue current diet. Discontinue oral nutrition supplements. Nutrition Assessment:  Pt states she has been eating well. Oral nutrition supplements to be discontinued. Malnutrition Assessment:  Malnutrition Status: At risk for malnutrition (Comment)    Context:  Acute Illness     Findings of the 6 clinical characteristics of malnutrition:  Energy Intake:  1 - 75% or less of estimated energy requirements for 7 or more days  Weight Loss:  Unable to assess     Body Fat Loss:  Unable to assess     Muscle Mass Loss:  Unable to assess    Fluid Accumulation:  No significant fluid accumulation Extremities   Strength:  Not Performed    Estimated Daily Nutrient Needs:  Energy (kcal):  3504-2374 based on [place] with 1.201.3 factor; Weight Used for Energy Requirements:  Admission     Protein (g):  67-76 based on 1.4-1.6 gm per kg; Weight Used for Protein Requirements:  Ideal          Nutrition Related Findings:  Distended abdomen. BM 1/9. Wounds:  Surgical Incision       Current Nutrition Therapies:    ADULT DIET; Regular  ADULT ORAL NUTRITION SUPPLEMENT; Lunch, Dinner; Standard High Calorie/High Protein Oral Supplement    Anthropometric Measures:  · Height: 5' 1\" (154.9 cm)  · Current Body Weight: 142 lb 6.7 oz (64.6 kg)   · Admission Body Weight: 142 lb 6.7 oz (64.6 kg)    · Usual Body Weight: 120 lb (54.4 kg) (per pt; unsure of time frame)     · Ideal Body Weight: 105 lbs; % Ideal Body Weight 135.6 %   · BMI: 26.9  · BMI Categories: Overweight (BMI 25.0-29. 9)       Nutrition Diagnosis:   · Inadequate oral intake related to altered GI function as evidenced by  (Previous intake 50% or less)    Nutrition Interventions:   Food and/or Nutrient Delivery:  Continue Current Diet,Discontinue Oral Nutrition Supplement  Nutrition Education/Counseling:  No recommendation at this time Coordination of Nutrition Care:  Continue to monitor while inpatient    Goals:  PO intake % of meals with good tolerance       Nutrition Monitoring and Evaluation:   Behavioral-Environmental Outcomes:  None Identified   Food/Nutrient Intake Outcomes:  Food and Nutrient Intake  Physical Signs/Symptoms Outcomes:  Biochemical Data,Constipation,Diarrhea,GI Status,Weight     Discharge Planning:    Continue current diet     Some areas of assessment may be incomplete due to standard COVID-19 Precautions. Elaine Garcia R.D., L.D.   Phone: [phone]

## 2022-01-10 NOTE — PROGRESS NOTES
Physical Medicine & Rehabilitation  Progress Note      Subjective:      62year old female with cauda equina syndrome. Patient is well, and has had no acute complaints or problems. Her pain is well controlled on her home medications and she is infrequently using her PRN Norco. No new issues with sleep, appetite, bowel, or bladder. UTI symptoms have resolved. ROS:  Denies fevers, chills, sweats. No chest pain, palpitations, lightheadedness. Denies coughing, wheezing or shortness of breath. Denies abdominal pain, nausea, diarrhea or constipation. No new areas of joint pain. Denies new areas of numbness or weakness. Denies new anxiety or depression issues. No new skin problems. Rehabilitation:   Progressing in therapies. PT:  Restrictions/Precautions: General Precautions,Surgical Protocols,Fall Risk,Up as Tolerated  Spinal Precautions: lumbar spine surgery  Other position/activity restrictions: Activity as tolerated.   per MD discharge papers pt OK to shower 3-4 days post surgery   Transfers  Sit to Stand: Stand by assistance,Contact guard assistance  Stand to sit: Stand by assistance  Bed to Chair: Minimal assistance (Rolling walker)  Stand Pivot Transfers: Stand by assistance (w/RW)  Squat Pivot Transfers: Contact guard assistance (w/c<>mat table no AD)  Comment: Pt demos safe hand placment and technique when transfering with and without AD  Ambulation 1  Surface: level tile  Device: Rolling Walker  Assistance: Stand by assistance  Quality of Gait: patient attempts to hip flex and heel contact to toe off with deliberate attempt and concentration  Gait Deviations: Slow Sharon,Decreased step height  Distance: 182ft  Comments: No loss of balance    Transfers  Sit to Stand: Stand by assistance,Contact guard assistance  Stand to sit: Stand by assistance  Bed to Chair: Minimal assistance (Rolling walker)  Stand Pivot Transfers: Stand by assistance (w/RW)  Squat Pivot Transfers: Contact guard assistance (w/c<>mat table no AD)  Comment: Pt alyssia safe hand placment and technique when transfering with and without AD  Ambulation  Ambulation?: Yes  More Ambulation?: Yes  Ambulation 1  Surface: level tile  Device: Rolling Walker  Assistance: Stand by assistance  Quality of Gait: patient attempts to hip flex and heel contact to toe off with deliberate attempt and concentration  Gait Deviations: Slow Sharon,Decreased step height  Distance: 182ft  Comments: No loss of balance    Surface: level tile  Ambulation 1  Surface: level tile  Device: Rolling Walker  Assistance: Stand by assistance  Quality of Gait: patient attempts to hip flex and heel contact to toe off with deliberate attempt and concentration  Gait Deviations: Slow Sharon,Decreased step height  Distance: 182ft  Comments: No loss of balance    OT:  ADL  Equipment Provided: Reacher,Long-handled sponge  Feeding: Modified independent ,Dentures  Grooming: Stand by assistance (standing at sink for grooming tasks)  UE Bathing: Modified independent   LE Bathing: Stand by assistance  UE Dressing: Modified independent  (seated)  LE Dressing: Minimal assistance,Setup (A TEDs only, able to thread pants/footies with setup)  Toileting: None  Additional Comments: CGA/SBA standing, varies with task, reacher/sock aid utilized, A to doff pants/footies due to tightness of material-attempted with reacher, VCs for safety in standing/using GB's         Balance  Sitting Balance: Modified independent   Standing Balance: Stand by assistance (1-2 UE support req)   Standing Balance  Time: AM: 4-5 min, 1-2 min x3  Activity: AM: functional mobility/transfers in room/bathroom, item retrieval for ADL setup, grooming at sink, self care tasks  Comment: no LOB noted  Functional Mobility  Functional - Mobility Device: Rolling Walker  Activity: Retrieve items,Transport items,To/from bathroom,Other (pt demo'd good safety for item retrieval)  Assist Level: Stand by assistance  Functional Mobility Comments: slight ataxic gait noted/unsteady but no gross LOB noted     Bed mobility  Bridging: Contact guard assistance (to help stabilize LLE)  Rolling to Left: Stand by assistance  Rolling to Right: Minimal assistance  Supine to Sit: Minimal assistance  Sit to Supine: Moderate assistance  Scooting: Stand by assistance  Comment: HOB slightly elevated. Transfers  Stand Step Transfers: Stand by assistance (with R/W)  Stand Pivot Transfers: Minimal assistance  Sit to stand: Stand by assistance  Stand to sit: Stand by assistance  Transfer Comments: good hand placement noted   Toilet Transfers  Toilet - Technique: Ambulating  Equipment Used: Grab bars  Toilet Transfer: Contact guard assistance  Toilet Transfers Comments: with RW, good hand placement this date     Shower Transfers  Shower - Transfer From: Walker (rolling)  Shower - Transfer Type: To and From  Shower - Transfer To: Transfer tub bench  Shower - Technique: Ambulating  Shower Transfers: Contact Guard  Shower Transfers Comments: CGA/SBA, VCs for tech crossing threshold, good carryover noted, no LOB       SPEECH:      Objective:  BP (!) 150/83   Pulse 84   Temp 98.6 °F (37 °C)   Resp 18   Ht 5' 1\" (1.549 m)   Wt 142 lb 6.4 oz (64.6 kg)   SpO2 98%   BMI 26.91 kg/m²       GEN: well developed, well nourished, NAD  HEENT: NCAT, PERRL, EOMI, mucous membranes pink and moist  CV: RRR, no murmurs, rubs or gallops  PULM: CTAB, no rales or rhonchi. Respirations WNL and unlabored  ABD: soft, NT, ND, BS+ and equal  NEURO: A&O x3. Sensation intact to light touch. DTRs 2+  MSK: Functional ROM all extremities . Strength 4+/5 key muscles BLEs, 5/5 key muscles BUEs. EXTREMITIES: No calf tenderness to palpation bilaterally. No edema BLEs  SKIN: warm dry and intact with good turgor. Well approximated midline lumbosacral incision with sutures intact, mild erythema/skin reaction at sutures, no drainage or induration. PSYCH: appropriately interactive. Affect WNL. Diagnostics:     CBC: No results for input(s): WBC, RBC, HGB, HCT, MCV, RDW, PLT in the last 72 hours. BMP: No results for input(s): NA, K, CL, CO2, PHOS, BUN, CREATININE, CA, GLUCOSE in the last 72 hours. BNP: No results for input(s): BNP in the last 72 hours. PT/INR: No results for input(s): PROTIME, INR in the last 72 hours. APTT: No results for input(s): APTT in the last 72 hours. CARDIAC ENZYMES: No results for input(s): CKMB, CKMBINDEX, TROPONINT in the last 72 hours. Invalid input(s): CKTOTAL;3 troponins   FASTING LIPID PANEL:  Lab Results   Component Value Date    CHOL 198 07/23/2020     (A) 07/23/2020    TRIG 96 07/23/2020     LIVER PROFILE: No results for input(s): AST, ALT, ALB, BILIDIR, BILITOT, ALKPHOS in the last 72 hours.      Current Medications:   Current Facility-Administered Medications: docusate sodium (COLACE) capsule 200 mg, 200 mg, Oral, BID  senna (SENOKOT) tablet 17.2 mg, 2 tablet, Oral, BID  HYDROcodone-acetaminophen (NORCO) 7.5-325 MG per tablet 1 tablet, 1 tablet, Oral, Q8H PRN  bisacodyl (DULCOLAX) suppository 10 mg, 10 mg, Rectal, Daily  enoxaparin (LOVENOX) injection 40 mg, 40 mg, SubCUTAneous, Daily  ondansetron (ZOFRAN-ODT) disintegrating tablet 4 mg, 4 mg, Oral, Q8H PRN **OR** [DISCONTINUED] ondansetron (ZOFRAN) injection 4 mg, 4 mg, IntraVENous, Q6H PRN  baclofen (LIORESAL) tablet 10 mg, 10 mg, Oral, TID  busPIRone (BUSPAR) tablet 30 mg, 30 mg, Oral, BID  ciprofloxacin (CIPRO) tablet 500 mg, 500 mg, Oral, 2 times per day  dilTIAZem (CARDIZEM 12 HR) extended release capsule 180 mg, 180 mg, Oral, Daily  [Held by provider] ferrous sulfate (IRON 325) tablet 325 mg, 325 mg, Oral, BID WC  levothyroxine (SYNTHROID) tablet 88 mcg, 88 mcg, Oral, Daily  methadone (DOLOPHINE) tablet 10 mg, 10 mg, Oral, BID  pantoprazole (PROTONIX) tablet 40 mg, 40 mg, Oral, Daily  magnesium hydroxide (MILK OF MAGNESIA) 400 MG/5ML suspension 30 mL, 30 mL, Oral, Daily PRN  acetaminophen (TYLENOL) tablet 650 mg, 650 mg, Oral, Q4H PRN  polyethylene glycol (GLYCOLAX) packet 17 g, 17 g, Oral, Daily      Impression/Plan:   Impaired ADLs, gait, and mobility due to:      1. Cauda Equina Syndrome: treated with laminectomy decompression of spinal deformity at L2-S1 by Dr. Lori Gutierrez on 12/30/21. PT/OT  for gait, mobility, strengthening, endurance, ADLs, and self care. On Norco prn pain, baclofen TID for spasms. Will be post-op day 14 on 1/13/22 - will follow up with Dr. Lori Gutierrez whether OK to remove sutures that day. 2. Chronic pain: on Norco and methadone. Prescribed by Dr. Fam Johnston as outpatient. 3. Pseudomonas UTI: on Ciprofloxacin until 1/13/21. Urine retention resolved at [facility], currently asymptomatic. 4. HTN: on Diltiazem  5. GERD: on pantoprazole  6. Hypothyroidism: on levothyroxine  7. Blood Loss Anemia: Hb low but stable. Monitoring. Ferrous sulfate on hold for previous constipation/ileus. 8. Anxiety: on buspirone  9. Bowel Management/constipation: Miralax daily, Senokot prn, Dulcolax prn, Milk of Magnesia prn  10. Family medicine for medical management  11. DVT prophylaxis:  On Lovenox. TEDs during the day, EPC cuffs overnight      Electronically signed by Kae Luther MD on 1/10/2022 at 10:52 AM      This note is created with the assistance of a speech recognition program.  While intending to generate a document that actually reflects the content of the visit, the document can still have some errors including those of syntax and sound a like substitutions which may escape proof reading. In such instances, actual meaning can be extrapolated by contextual diversion.

## 2022-01-10 NOTE — PATIENT CARE CONFERENCE
[address]   ACUTE REHABILITATION  TEAM CONFERENCE NOTE  Date: 22  Patient Name: Veronika Antoine       Room: 5466/3294-23  MRN: [id number]       : 1960  (62 y.o.)     Gender: female   Referring Practitioner: Dr Katie Tyson   Cauda equina syndrome [facility]) [G83.4]  Diagnosis: Lumbar stenosis with neurogenic claudication, L2-S1 laminectomy on 21 for cauda equina syndrome (Dr. Hunter Up), Cervical stenosis with myelopathy, decreased BLE strength , L weaker than R,     NURSING  Bladder  Always Continent  Bowel   Always Continent  Date of Last BM:   Intervention    Both Bowel & Bladder Program     Wounds/Incisions/Ulcers: Incision healing well-some redness around site  Medication Education Program: Patient able to manage medications and being educated by nursing  Pain: Patient's pain is currently controlled with -  Scheduled Methadone 10 mg po bid. Norco 7.5 mg po q 8 hrs prn. Fall Risk:  Falling star program initiated    PHYSICAL THERAPY    Bed Mobility  Bridging: Contact guard assistance (to help stabilize LLE)  Rolling: Stand by assistance  Sit to Supine: Stand by assistance  Scooting: Stand by assistance  Comment: mat, wedge( for comfort- pt sleeps in a recliner at home) 2 pillows (is able to complete from flat surface)    Transfers:  Sit to Stand: Stand by assistance;Contact guard assistance  Stand to sit: Stand by assistance  Squat Pivot Transfers: Contact guard assistance (w/c<>mat table no AD)  Comment: rest breaks PRN.      Ambulation 1  Surface: level tile  Device: Rolling Walker  Assistance: Stand by assistance  Quality of Gait: patient attempts to hip flex and heel contact to toe off with deliberate attempt and concentration  Gait Deviations: Deviated path;Decreased step length;Decreased step height  Distance: 185 feet x2; 281 feet  Comments: No loss of balance  Ambulation 2  Surface - 2: level tile;ramp  Device 2: Rolling Walker  Assistance 2: Contact guard assistance  Quality of Gait 2: Decrease LLE foot clearance with slight circumduction. Cues to correct. (instability of L ankle noted)  Gait Deviations: Slow Sharon;Decreased step height  Distance: 160ft    # Steps : 15  Rails: Bilateral  Device: No Device  Assistance: Contact guard assistance  Comment: Pt able to verbalized correct step to sequencing and demonstrate with CGA only. Again Ed pt to \"marching\" LLE up vs circumducting. Equipment Needed: Has a rolling walker    Goals  Time Frame for Short term goals: 1 week  Short term goal 1: Pt will ambulate 200 feet with a RW and SBA to increase functional independence. Short term goal 2: Pt able to perform bed mobility at SBA  Short term goal 3: Pt will demonstrate good- standing balance with rolling walker to decrease fall risk. Short term goal 4: Pt will perform sit<>stand and pivot transfer with rolling walker SBA to increase functional independence. Short term goal 5: Pt will negotiate 5 stair with 2 handrail and CGA to allow the pt to enter prior living arrangements.     OCCUPATIONAL THERAPY  SELF CARE   Equipment Provided: Reacher,Long-handled sponge  Eating            Modified independent ;Dentures   Oral Hygiene            Stand by assistance (standing at sink for grooming tasks)   Shower/Bathe Self             UE Bathing: Modified independent   LE Bathing: Stand by assistance   Upper Body Dressing            Modified independent  (seated)   Lower Body Dressing            Putting On/Taking Off Footwear             Minimal assistance;Setup (A TEDs only, able to thread pants/footies with setup)   Toilet Transfer                 Toileting Hygiene            None    Shower Transfers: Contact Guard  Balance  Sitting Balance: Modified independent   Standing Balance: Stand by assistance (1-2 UE support req)  Standing Balance  Time: AM: 4-5 min, 1-2 min x3; PM: 1-2 min x2  Activity: AM: functional mobility/transfers in room/bathroom, item retrieval for ADL setup, grooming at sink, self care tasks; PM: functional mobility, toilet transfer and toileting tasks, with R/W, SBA req with no LOB noted  Comment: no LOB noted    Equipment Recommendations  Equipment Needed: Yes  Hand Held Shower: x  Transfer Tub Bench: x  Grab bars: x  Reacher: x  Sock Aid: X       Short term goals  Time Frame for Short term goals: 1 week  Short term goal 1: min assist LE self care  Short term goal 2: CGA amb to obtain set up for adls with good walker technique  Short term goal 3: joseph 8-9 min stand , ambulate for adls with RW and CGA  Short term goal 4: joseph 10 reps x 2 B shld ex with 1 lb weight on L and 2 lb weight on R  and 20 reps x 2 elbow ex with 2 lb wt on L and 3 lb on R for work joseph and UE strengthening. Short term goal 5: joseph repetitive  ex LUE with mod resistance for hand strengthening. SPEECH THERAPY    NUTRITION  Weight: 142 lb 6.4 oz (64.6 kg) / Body mass index is 26.91 kg/m². Diet Rx: Regular. PO intake has improved and appears adequate. Oral nutrition supplements discontinued. Please see nutrition note for details. SOCIAL WORK ASSESSMENT  Assessment: lives with s/o  Pre-Admission Status:  Lives With: Significant other (Emmett)  Type of Home: House  Home Layout: Two level,Able to Live on Main level with bedroom/bathroom  Home Access: Stairs to enter without rails  Entrance Stairs - Number of Steps: 1  Bathroom Shower/Tub: Tub/Shower unit,Doors  Bathroom Toilet: Standard (Toilet raiser)  Bathroom Equipment: Toilet raiser (Have a sink counter on the side of her toilet)  Bathroom Accessibility: Accessible  Home Equipment: Rolling walker,4 wheeled walker,Lift chair,Cane,Reacher (pt reported using RW majority of time)  ADL Assistance: Needs assistance (pt reported occasionally requiring assistance for donning shirt.  pt reported signficantother assists with getting in/out shower)  Homemaking Assistance: Needs assistance  Homemaking Responsibilities: Yes (pt reported splitting with IADLs)  Meal Prep Responsibility: Secondary  Laundry Responsibility: Secondary  Cleaning Responsibility: Secondary  Ambulation Assistance: Independent (Recently started using rolling walker for last 3 to 4 months)  Transfer Assistance: Independent  Active : No (pt requires assistance for getting into truck)  Patient's  Info: significant other drives, they own only 1 vehicle now, pt was driving until 4 months ago  Mode of Transportation: Truck  Occupation: On [address]: watch TV  IADL Comments: Pt sleeps in a lift recliner chair, pt has carpet most of the area. needs cervical spine surgery when able post this surgery. 25year old grandson at her house temporarily due to aunt he was staying with has covid. Additional Comments: Pt reported significant other works 5 am to 4 pm but able to assist PRN when home. worsening function over past 2-4 months - has needed more assist with advanced adls. pt does dusting, spouse vacums, pt no longer able to walk with cart through entire grocery store. has main floor laundry (3 inch step to access this room) shares laundry and cook with spouse, prior to past months pt indep all advanced adls except went together for [employer]. Family Education: Family Education not required    Percentage Risk for Readmission: Low 0 - 18%   Risk of Unplanned Readmission:  12 %    Critical Items: None       Problem / Barrier Intervention / Plan  Results   Impaired functional mobility  Strengthening, functional mobility training with assistive device     Altered ADL status  Training in Modified Care strategies and use of devices and Energy Conservation techniques to support safe care performance                                                 Total Self Care Score    Total Mobility Score  Admission Score:  27      Admission Score:  29  Goal:  41/42         Goal:  78/90   `  Discharge Plan   Estimated Discharge Date: 1/14/2022  Home evaluation needed?  Home Evaluation Indication (NO, Requires ReEval, YES/Date): No home evaluation need indicated for patient at this time  Overnight or Day Pass: No  Factors facilitating achievement of predicted outcomes: Family support, Caregiver support, Motivated, Cooperative, Pleasant, Good insight into deficits and Has homemaker services  Barriers to the achievement of predicted outcomes: Pain, Decreased endurance, Lower extremity weakness, Medical complications, Skin Care and Medication managment    Functional Goals at discharge:  Predicted Outcome: Home with familyPATIENT'S LEVEL OF ASSISTANCE: Modified independence  Discharge therapy goals:  PT: Long term goals  Time Frame for Long term goals : By DC  Long term goal 1: Pt able to perform bed mobility mod-I  Long term goal 2: Pt able to transfer at mod-I  Long term goal 3: Pt able to ambulate with rolling walker distance of 150 ft, mod-I, level surfaces  Long term goal 4:  Pt able to ambulate on incline surface at SBA with rolling walker. Long term goal 5: Pt able to get up and down curb steps with UE support, CGA  Long term goal 6: Improve 2MWT distance to 100 ft to improve gait speed and overall fucntion. Long term goal 7: Pt able to go up and 12 steps with 2 UE support at SBA to improve B LE strength. OT:Long term goals  Time Frame for Long term goals : by discharge  Long term goal 1: mod indep self care  Long term goal 2: mod indep amb to obtain set up for adls with good walker safety  Long term goal 3: joseph 11-13 min stand mod indep during adls  Long term goal 4: mod assist with transfer to tub to use tub seat vs remove shower doors for extended tub bench transfer with min assist.  Long term goal 5: mod indep with simple advanced adls with good walker safety and good balance. Long term goals 6: indep with BUE HEP for strengthening and coordination. Long term goal 7: increase L hand  strength to 40 pounds.   ST:     Participating Team Members:  /: SURINDER Rodriguez   Occupational Therapist: Minnie Felder OT   Physical Therapist: Дмитрий Anderson PT  Speech Therapist:  N/A  Nurse: Fritz Dave RN   Dietary/Nutrition: Patricia Velazquez RD, LD  Pastoral Care: Linh Cueva  CMG: Bal Reno RN    I approve the established interdisciplinary plan of care as documented within the medical record of Radha Yanes.     Dalton Ayon MD

## 2022-01-10 NOTE — PROGRESS NOTES
[address] [facility]    ACUTE REHABILITATION OCCUPATIONAL THERAPY  DAILY NOTE    Date: 1/10/22  Patient Name: Bailee Rivers      Room: 8668/3366-20    MRN: [id number]   : 1960  (62 y.o.)  Gender: female      Diagnosis: Lumbar stenosis with neurogenic claudication, L2-S1 laminectomy on 21 for cauda equina syndrome (Dr. Juan Hill), Cervical stenosis with myelopathy, decreased BLE strength , L weaker than R,  Additional Pertinent Hx: worsening function over past 2 months - has needed more assist with advanced adls. wears pullups at home due to urgency issues. needs cervical spine surgery when able post this surgery. Left facial symptoms: could be early bells palsy    Restrictions  Restrictions/Precautions: General Precautions,Surgical Protocols,Fall Risk,Up as Tolerated  Spinal Precautions: lumbar spine surgery  Other position/activity restrictions: Activity as tolerated. per MD discharge papers pt OK to shower 3-4 days post surgery  Required Braces or Orthoses?: No    Subjective  Subjective: \"the pain in my back shoots to my legs, it's a funny kind of pain\"  Comments: pt reports chronic back pain and seeing pain mgmt for 10+ years  Patient Currently in Pain: Yes  Pain Level: 5  Pain Location: Back  Pain Orientation: Mid;Lower  Restrictions/Precautions: General Precautions;Surgical Protocols; Fall Risk;Up as Tolerated  Overall Orientation Status: Within Functional Limits  Patient Observation  Observations: motivated to return home  Pain Assessment  Pain Assessment: 0-10  Pain Level: 5  Pain Type: Chronic pain  Pain Location: Back  Pain Orientation: Mid,Lower  Pain Descriptors: Sore    Objective  Cognition  Overall Cognitive Status: WFL  Perception  Overall Perceptual Status: WFL  Balance  Sitting Balance: Modified independent   Standing Balance: Stand by assistance (1-2 UE support req)  Transfers  Stand Step Transfers: Stand by assistance (with R/W)  Sit to stand: Stand by assistance  Stand to sit: Stand by assistance  Transfer Comments: good hand placement noted  Standing Balance  Time: AM: 4-5 min, 1-2 min x3; PM: 1-2 min x2  Activity: AM: functional mobility/transfers in room/bathroom, item retrieval for ADL setup, grooming at sink, self care tasks; PM: functional mobility, toilet transfer and toileting tasks, with R/W, SBA req with no LOB noted  Comment: no LOB noted  Functional Mobility  Functional - Mobility Device: Rolling Walker  Activity: Retrieve items;Transport items; To/from bathroom; Other (pt demo'd good safety for item retrieval)  Assist Level: Stand by assistance  Functional Mobility Comments: slight ataxic gait noted/unsteady but no gross LOB noted  Shower Transfers  Shower - Transfer From: Walker (rolling)  Shower - Transfer Type: To and From  Shower - Transfer To:  Transfer tub bench  Shower - Technique: Ambulating  Shower Transfers: Contact Guard  Shower Transfers Comments: CGA/SBA, VCs for tech crossing threshold, good carryover noted, no LOB     Type of ROM/Therapeutic Exercise  Type of ROM/Therapeutic Exercise: Free weights (2# RUE, 1# LUE, 20 reps)  Comment: pt engaged in BUE ex's to support mobility/transfers and overall endurance, rest breaks req but demo'd good tolerance, all within precautions  Exercises  Scapular Protraction: x  Scapular Retraction: x  Shoulder Flexion: x  Shoulder Extension: x  Shoulder ABduction: x  Shoulder ADduction: x  Horizontal ABduction: x  Horizontal ADduction: x  Elbow Flexion: x  Elbow Extension: x     Additional Activities: pt participated in PURDUE pegboard to address FM skills in L hand, task to place washer/mic and nut in slotted board, fair + in-hand manipulation for board pieces, increased time req for  placement due to small sizing of pieces, pt able to complete with no A, pt reports improvement for sensation and coordination, continue to address for optimal outcome                 ADL  Equipment Provided: Reacher;Long-handled sponge  Feeding: Modified independent ;Dentures  Grooming: Stand by assistance (standing at sink for grooming tasks)  UE Bathing: Modified independent   LE Bathing: Stand by assistance  UE Dressing: Modified independent  (seated)  LE Dressing: Minimal assistance;Setup (A TEDs only, able to thread pants/footies with setup)  Toileting: None          Assessment  Performance deficits / Impairments: Decreased functional mobility ; Decreased ADL status; Decreased endurance;Decreased high-level IADLs;Decreased balance;Decreased coordination;Decreased sensation;Decreased posture;Decreased ROM; Decreased strength;Decreased fine motor control  Prognosis: Good  Discharge Recommendations: Patient would benefit from continued therapy after discharge  Activity Tolerance: Patient Tolerated treatment well  Safety Devices in place: Yes  Type of devices: Nurse notified; Left in chair;Call light within reach  Equipment Recommendations  Equipment Needed: Yes  Hand Held Shower: x  Transfer Tub Bench: x  Grab bars: x  Reacher: x  Sock Aid: X       Patient Education: DC planning/DME review, transfer safety, strengthening, [address] in hands for improvement with functional tasks   Patient Goals   Patient goals : walk safely to be home alone while spouse at work  Learner:patient  Method: demonstration and explanation       Outcome: needs reinforcement/demonstrated understanding         Plan  Plan  Times per week: 5-7  Times per day: Twice a day  Current Treatment Recommendations: Strengthening,Patient/Caregiver Education & Training,Home Management Training,Equipment Evaluation, Education, & procurement,Balance Training,Functional Mobility Training,Endurance Training,Safety Education & Training,Self-Care / ADL  Patient Goals   Patient goals : walk safely to be home alone while spouse at work  Short term goals  Time Frame for Short term goals: 1 week  Short term goal 1: min assist LE self care  Short term goal 2: CGA amb to obtain set up for adls with good walker technique  Short term goal 3: joseph 8-9 min stand , ambulate for adls with RW and CGA  Short term goal 4: joseph 10 reps x 2 B shld ex with 1 lb weight on L and 2 lb weight on R  and 20 reps x 2 elbow ex with 2 lb wt on L and 3 lb on R for work joseph and UE strengthening. Short term goal 5: joseph repetitive  ex LUE with mod resistance for hand strengthening. Long term goals  Time Frame for Long term goals : by discharge  Long term goal 1: mod indep self care  Long term goal 2: mod indep amb to obtain set up for adls with good walker safety  Long term goal 3: joseph 11-13 min stand mod indep during adls  Long term goal 4: mod assist with transfer to tub to use tub seat vs remove shower doors for extended tub bench transfer with min assist.  Long term goal 5: mod indep with simple advanced adls with good walker safety and good balance. Long term goals 6: indep with BUE HEP for strengthening and coordination. Long term goal 7: increase L hand  strength to 40 pounds.         01/10/22 1430 01/10/22 1526   OT Individual Minutes   Time In 1430 1006   Time Out 1506 1100   Minutes 36 54     Electronically signed by GOOD Alvarez on 1/10/22 at 3:27 PM EST

## 2022-01-10 NOTE — PROGRESS NOTES
Physical Therapy  Facility/Department: [facility] ACUTE REHAB  Daily Treatment Note  NAME: Veronika Antoine  : 1960  MRN: [id number]    Date of Service: 1/10/2022    Discharge Recommendations:  Patient would benefit from continued therapy after discharge,Home with assist PRN        Assessment            Patient Diagnosis(es): There were no encounter diagnoses. has a past medical history of Anxiety, Arthritis, At maximum risk for fall, Cancer ([address] Cauda equina syndrome (HCC), Cervical stenosis of spine, GERD (gastroesophageal reflux disease), History of stomach ulcers, Hypertension, Hypothyroidism, Lumbar neuritis, Post laminectomy syndrome, Spinal deformity, and Thyroid disease. has a past surgical history that includes back surgery; hernia repair (Bilateral); Breast surgery (Right); cervical laminectomy (2014); Mastectomy, radical; Hysterectomy, total abdominal; and Lumbar spine surgery (N/A, 2021). Restrictions  Restrictions/Precautions  Restrictions/Precautions: General Precautions,Surgical Protocols,Fall Risk,Up as Tolerated  Required Braces or Orthoses?: No  Position Activity Restriction  Spinal Precautions: lumbar spine surgery  Other position/activity restrictions: Activity as tolerated.   per MD discharge papers pt OK to shower 3-4 days post surgery  Subjective              Orientation     Cognition      Objective   Bed mobility  Bridging: Contact guard assistance (to help stabilize LLE)  Scooting: Stand by assistance  Transfers  Sit to Stand: Stand by assistance;Contact guard assistance  Stand to sit: Stand by assistance  Stand Pivot Transfers: Stand by assistance (w/RW)  Squat Pivot Transfers: Contact guard assistance (w/c<>mat table no AD)  Comment: Pt alyssia safe hand placment and technique when transfering with and without AD  Ambulation 1  Surface: level tile  Device: Rolling Walker  Assistance: Stand by assistance  Quality of Gait: patient attempts to hip flex and heel contact to toe off with deliberate attempt and concentration  Gait Deviations: Deviated path;Decreased step length;Decreased step height  Distance: 185 feet x2; 281 feet  Comments: No loss of balance  Stairs  # Steps : 15  Rails: Bilateral  Assistance: Contact guard assistance  Comment: Pt able to verbalized correct step to sequencing and demonstrate with CGA only. Again Ed pt to \"marching\" LLE up vs circumducting. Propulsion 1  Propulsion: Manual  Level: Level Tile  Method: RUE;LUE  Distance: 50'x1        Other exercises  Other exercises 1: standing RLE  Other exercises 5: Supine: (B) LE ex: 1.5# x 15 ea  Other exercises 6: Bridges x 10- CGA to stabilize LLE                        G-Code     OutComes Score                                                     AM-PAC Score             Goals  Short term goals  Time Frame for Short term goals: 1 week  Short term goal 1: Pt will ambulate 200 feet with a RW and SBA to increase functional independence. Short term goal 2: Pt able to perform bed mobility at SBA  Short term goal 3: Pt will demonstrate good- standing balance with rolling walker to decrease fall risk. Short term goal 4: Pt will perform sit<>stand and pivot transfer with rolling walker SBA to increase functional independence. Short term goal 5: Pt will negotiate 5 stair with 2 handrail and CGA to allow the pt to enter prior living arrangements. Long term goals  Time Frame for Long term goals : By DC  Long term goal 1: Pt able to perform bed mobility mod-I  Long term goal 2: Pt able to transfer at mod-I  Long term goal 3: Pt able to ambulate with rolling walker distance of 150 ft, mod-I, level surfaces  Long term goal 4:  Pt able to ambulate on incline surface at SBA with rolling walker. Long term goal 5: Pt able to get up and down curb steps with UE support, CGA  Long term goal 6: Improve 2MWT distance to 100 ft to improve gait speed and overall fucntion.   Long term goal 7: Pt able to go up and 12 steps with 2 UE support at SBA to improve B LE strength. Patient Goals   Patient goals : Go home soon    Plan    Plan  Times per week: 1.5 hr/day, 5 to 7 days/week.   Times per day: Daily  Current Treatment Recommendations: Strengthening,ROM,Stair training,Balance Training,Gait Training,Patient/Caregiver Education & Training,Equipment Evaluation, Education, & procurement,Neuromuscular Re-education,Functional Mobility Training,Endurance Training,Transfer Training,Safety Education & Training,Home Exercise Program  Safety Devices  Type of devices: Gait belt,Patient at risk for falls,All fall risk precautions in place  Restraints  Initially in place: No     Therapy Time     01/10/22 0804 01/10/22 1507   PT Individual Minutes   Time In [address]   Time Out [id number]   [facility] 94 14   PT Concurrent Minutes   Time In  --  1330   Time Out  --  1346   Minutes  --  16     Tyra Kwan, PTA

## 2022-01-10 NOTE — PLAN OF CARE
Nutrition Problem #1: Inadequate oral intake  Intervention: Food and/or Nutrient Delivery: Continue Current Diet,Discontinue Oral Nutrition Supplement  Nutritional Goals: PO intake % of meals with good tolerance

## 2022-01-10 NOTE — PLAN OF CARE
Problem: Pain:  Goal: Pain level will decrease  1/10/2022 1825 by Cristina Munoz RN  Outcome: Ongoing     Problem: Pain:  Goal: Control of acute pain  1/10/2022 1825 by Cristina Munoz RN  Outcome: Ongoing     Problem: Falls - Risk of:  Goal: Will remain free from falls  1/10/2022 1825 by Cristina Munoz RN  Outcome: Ongoing     Problem: Falls - Risk of:  Goal: Absence of physical injury  1/10/2022 1825 by Cristina Munoz RN  Outcome: Ongoing     Problem: Skin Integrity:  Goal: Will show no infection signs and symptoms  1/10/2022 1825 by Cristina Munoz RN  Outcome: Ongoing     Problem: Musculor/Skeletal Functional Status  Goal: Highest potential functional level  1/10/2022 1825 by Cristina Munoz RN  Outcome: Ongoing

## 2022-01-10 NOTE — PROGRESS NOTES
Progress Note    1/10/2022 9:28 AM    Subjective:   No chief complaint on file. Interval History: Pt without complaints. S/p surgery for lumbar stenosis. States this is her 3rd back surgery. Pain is controlled. Had bowel movement yesterday. No chest pain or sob. On methadone from pain management. Diet: ADULT DIET; Regular  ADULT ORAL NUTRITION SUPPLEMENT; Lunch, Dinner; Standard High Calorie/High Protein Oral Supplement    Medications:   Reviewed medications    Labs:   CBC: No results for input(s): WBC, HGB, PLT in the last 72 hours. BMP:  No results for input(s): NA, K, CL, CO2, BUN, CREATININE, GLUCOSE in the last 72 hours. Hepatic: No results for input(s): AST, ALT, ALB, BILITOT, ALKPHOS in the last 72 hours. Troponin: No results for input(s): TROPONINI in the last 72 hours. BNP: No results for input(s): BNP in the last 72 hours. Lipids: No results for input(s): CHOL, HDL in the last 72 hours. Invalid input(s): LDLCALCU  INR: No results for input(s): INR in the last 72 hours.     CBC with Differential:    Lab Results   Component Value Date    WBC 6.0 01/05/2022    RBC 2.95 01/05/2022    HGB 9.7 01/05/2022    HCT 28.5 01/05/2022     01/05/2022    MCV 96.6 01/05/2022    MCH 33.0 01/05/2022    MCHC 34.1 01/05/2022    RDW 13.3 01/05/2022    LYMPHOPCT 12 01/04/2022    MONOPCT 9 01/04/2022    BASOPCT 1 01/04/2022    MONOSABS 0.85 01/04/2022    LYMPHSABS 1.15 01/04/2022    EOSABS 0.12 01/04/2022    BASOSABS 0.06 01/04/2022    DIFFTYPE NOT REPORTED 01/04/2022     BMP:    Lab Results   Component Value Date     01/05/2022    K 3.9 01/05/2022    CL 97 01/05/2022    CO2 27 01/05/2022    BUN 9 01/05/2022    CREATININE 0.63 01/05/2022    CALCIUM 8.5 01/05/2022    GFRAA >60 01/05/2022    LABGLOM >60 01/05/2022    GLUCOSE 94 01/05/2022       Objective:   Vitals: BP (!) 150/83   Pulse 84   Temp 98.6 °F (37 °C)   Resp 18   Ht 5' 1\" (1.549 m)   Wt 142 lb 6.4 oz (64.6 kg)   SpO2 98%   BMI 26.91 kg/m²   General appearance: alert and cooperative with exam  Neck: no adenopathy, no carotid bruit, no JVD, supple, symmetrical, trachea midline and thyroid not enlarged, symmetric, no tenderness/mass/nodules  Lungs: clear to auscultation bilaterally  Heart: regular rate and rhythm, S1, S2 normal, no murmur, click, rub or gallop  Abdomen: soft, non-tender; bowel sounds normal; no masses,  no organomegaly  Extremities: extremities normal, atraumatic, no cyanosis or edema  Neurologic: Mental status: Alert, oriented, thought content appropriate    Assessment and Plan:   1. S/p laminectomy for cauda equina syndrome - post op day 11  2. Continue with therapy  3.  Will follow     Patient Active Problem List:     Cervical stenosis of spine     Essential hypertension     Hypothyroidism     Lumbar radiculopathy, chronic     Lumbar neuritis     Post laminectomy syndrome     Hypokalemia     Pain of right hip joint     Post-menopausal osteoporosis     Chronic gastritis without bleeding     Elevated CEA     Esophageal dysphagia     Hypotension due to drugs     Ulcerative esophagitis     Weight loss, abnormal     Cervical myelopathy (HCC)     Lumbar stenosis with neurogenic claudication     Spinal deformity     Anxiety about health     Cauda equina compression (HCC)     Anemia, normocytic normochromic     Iron deficiency     Pseudomonas urinary tract infection     Hypocalcemia     Cauda equina syndrome [facility])      Electronically signed by Yuri Luna MD on 1/10/2022 at 9:28 AM

## 2022-01-11 PROCEDURE — 6370000000 HC RX 637 (ALT 250 FOR IP): Performed by: NURSE PRACTITIONER

## 2022-01-11 PROCEDURE — 6360000002 HC RX W HCPCS: Performed by: NURSE PRACTITIONER

## 2022-01-11 PROCEDURE — 97535 SELF CARE MNGMENT TRAINING: CPT

## 2022-01-11 PROCEDURE — 97530 THERAPEUTIC ACTIVITIES: CPT

## 2022-01-11 PROCEDURE — 6370000000 HC RX 637 (ALT 250 FOR IP): Performed by: PHYSICAL MEDICINE & REHABILITATION

## 2022-01-11 PROCEDURE — 97110 THERAPEUTIC EXERCISES: CPT

## 2022-01-11 PROCEDURE — 1180000000 HC REHAB R&B

## 2022-01-11 PROCEDURE — 97116 GAIT TRAINING THERAPY: CPT

## 2022-01-11 PROCEDURE — 6370000000 HC RX 637 (ALT 250 FOR IP): Performed by: FAMILY MEDICINE

## 2022-01-11 PROCEDURE — 99232 SBSQ HOSP IP/OBS MODERATE 35: CPT | Performed by: PHYSICAL MEDICINE & REHABILITATION

## 2022-01-11 RX ORDER — POLYETHYLENE GLYCOL 3350 17 G/17G
17 POWDER, FOR SOLUTION ORAL DAILY PRN
Status: DISCONTINUED | OUTPATIENT
Start: 2022-01-11 | End: 2022-01-14 | Stop reason: HOSPADM

## 2022-01-11 RX ORDER — BISACODYL 10 MG
10 SUPPOSITORY, RECTAL RECTAL DAILY PRN
Status: DISCONTINUED | OUTPATIENT
Start: 2022-01-11 | End: 2022-01-14 | Stop reason: HOSPADM

## 2022-01-11 RX ADMIN — METHADONE HYDROCHLORIDE 10 MG: 10 TABLET ORAL at 09:03

## 2022-01-11 RX ADMIN — DOCUSATE SODIUM 200 MG: 100 CAPSULE, LIQUID FILLED ORAL at 20:58

## 2022-01-11 RX ADMIN — BACLOFEN 10 MG: 10 TABLET ORAL at 07:41

## 2022-01-11 RX ADMIN — SENNOSIDES 17.2 MG: 8.6 TABLET, COATED ORAL at 07:41

## 2022-01-11 RX ADMIN — SENNOSIDES 17.2 MG: 8.6 TABLET, COATED ORAL at 20:58

## 2022-01-11 RX ADMIN — DILTIAZEM HYDROCHLORIDE 180 MG: 90 CAPSULE, EXTENDED RELEASE ORAL at 07:43

## 2022-01-11 RX ADMIN — PANTOPRAZOLE SODIUM 40 MG: 40 TABLET, DELAYED RELEASE ORAL at 06:03

## 2022-01-11 RX ADMIN — BUSPIRONE HYDROCHLORIDE 30 MG: 15 TABLET ORAL at 07:43

## 2022-01-11 RX ADMIN — BACLOFEN 10 MG: 10 TABLET ORAL at 20:59

## 2022-01-11 RX ADMIN — CIPROFLOXACIN 500 MG: 500 TABLET, FILM COATED ORAL at 07:44

## 2022-01-11 RX ADMIN — LEVOTHYROXINE SODIUM 88 MCG: 0.09 TABLET ORAL at 06:03

## 2022-01-11 RX ADMIN — BACLOFEN 10 MG: 10 TABLET ORAL at 15:11

## 2022-01-11 RX ADMIN — DOCUSATE SODIUM 200 MG: 100 CAPSULE, LIQUID FILLED ORAL at 07:41

## 2022-01-11 RX ADMIN — HYDROCODONE BITARTRATE AND ACETAMINOPHEN 1 TABLET: 7.5; 325 TABLET ORAL at 15:11

## 2022-01-11 RX ADMIN — FERROUS SULFATE TAB 325 MG (65 MG ELEMENTAL FE) 325 MG: 325 (65 FE) TAB at 16:34

## 2022-01-11 RX ADMIN — BUSPIRONE HYDROCHLORIDE 30 MG: 15 TABLET ORAL at 20:59

## 2022-01-11 RX ADMIN — CIPROFLOXACIN 500 MG: 500 TABLET, FILM COATED ORAL at 20:58

## 2022-01-11 RX ADMIN — ENOXAPARIN SODIUM 40 MG: 100 INJECTION SUBCUTANEOUS at 07:41

## 2022-01-11 RX ADMIN — METHADONE HYDROCHLORIDE 10 MG: 10 TABLET ORAL at 20:59

## 2022-01-11 ASSESSMENT — PAIN SCALES - GENERAL
PAINLEVEL_OUTOF10: 6
PAINLEVEL_OUTOF10: 5
PAINLEVEL_OUTOF10: 6
PAINLEVEL_OUTOF10: 8

## 2022-01-11 ASSESSMENT — PAIN DESCRIPTION - PAIN TYPE: TYPE: CHRONIC PAIN

## 2022-01-11 ASSESSMENT — PAIN DESCRIPTION - LOCATION: LOCATION: BACK

## 2022-01-11 ASSESSMENT — PAIN DESCRIPTION - DESCRIPTORS: DESCRIPTORS: RADIATING

## 2022-01-11 NOTE — PROGRESS NOTES
[address]   ACUTE REHABILITATION OCCUPATIONAL THERAPY  DAILY NOTE    Date: 22  Patient Name: Zoë Martinez      Room: 5938/0876-08    MRN: [id number]   : 1960  (62 y.o.)  Gender: female      Diagnosis: Lumbar stenosis with neurogenic claudication, L2-S1 laminectomy on 21 for cauda equina syndrome (Dr. Louisa Chavarria), Cervical stenosis with myelopathy, decreased BLE strength , L weaker than R,  Additional Pertinent Hx: worsening function over past 2 months - has needed more assist with advanced adls. wears pullups at home due to urgency issues. needs cervical spine surgery when able post this surgery. Left facial symptoms: could be early bells palsy    Restrictions  Restrictions/Precautions: General Precautions,Surgical Protocols,Fall Risk,Up as Tolerated  Spinal Precautions: lumbar spine surgery  Other position/activity restrictions: Activity as tolerated. per MD discharge papers pt OK to shower 3-4 days post surgery  Required Braces or Orthoses?: No    Subjective  Subjective: \"my foot is tired today\"  Comments: pt reports fatigue with L foot this date, noted dragging of L foot with mobility  Patient Currently in Pain: Yes  Pain Level: 5  Pain Location: Back  Restrictions/Precautions: General Precautions;Surgical Protocols; Fall Risk;Up as Tolerated  Overall Orientation Status: Within Functional Limits  Patient Observation  Observations: motivated to return home  Pain Assessment  Pain Assessment: 0-10  Pain Level: 5  Pain Type: Chronic pain  Pain Location: Back  Pain Orientation: Mid,Lower  Pain Descriptors: Radiating (per pt pain from neck to back to legs and feet)    Objective  Cognition  Overall Cognitive Status: WFL  Perception  Overall Perceptual Status: WFL  Balance  Sitting Balance: Modified independent   Standing Balance: Stand by assistance (SBA dynamic/SUP static, UE support)  Transfers  Stand Step Transfers: Stand by assistance (with R/W)  Sit to stand: Supervision  Stand to sit: Supervision  Transfer Comments: good hand placement noted  Standing Balance  Time: AM: 1-2 min x3, 4-5 min, 1 min x2, 3-4 min, 1 min  Activity: AM: functional mobility/transfers in room/bathroom, toileting tasks, item retrieval for ADL setup, grooming at sink, self care tasks, dynamic standing to participate in gentle reaching task, crossing midline to participate in task, 1-2 UE support on table as needed with no LOB noted, SUP req, good tolerance noted  Comment: no LOB noted  Functional Mobility  Functional - Mobility Device: Rolling Walker  Activity: Retrieve items;Transport items; To/from bathroom; Other (good safety for item retrieval )  Assist Level: Stand by assistance  Functional Mobility Comments: slight ataxic gait noted/unsteady but no gross LOB noted  Toilet Transfers  Toilet - Technique: Ambulating  Equipment Used: Grab bars  Toilet Transfer: Stand by assistance  Toilet Transfers Comments: with RW, good hand placement this date  Shower Transfers  Shower - Transfer From: Walker (rolling)  Shower - Transfer Type: To and From  Shower - Transfer To:  Transfer tub bench  Shower - Technique: Ambulating  Shower Transfers: Stand by assistance  Shower Transfers Comments: close SBA, VCs for tech crossing threshold, good carryover noted, no LOB     Type of ROM/Therapeutic Exercise  Type of ROM/Therapeutic Exercise: Free weights (BUE's, 25#, 1# LUE, 2# RUE)  Comment: pt engaged in BUE ex's to support mobility/transfers and overall endurance, rest breaks req but demo'd good tolerance, all within precautions  Exercises  Scapular Protraction: x  Scapular Retraction: x  Shoulder Flexion: x  Shoulder Extension: x  Horizontal ABduction: x  Horizontal ADduction: x  Elbow Flexion: x  Elbow Extension: x                       ADL  Equipment Provided: Reacher;Long-handled sponge  Feeding: Modified independent ;Dentures  Grooming: Stand by assistance (SBA/SUP standing at sink with UE support)  UE Bathing: Modified independent   LE Bathing: Stand by assistance  UE Dressing: Modified independent   LE Dressing: Minimal assistance;Setup (A TEDs only, pt able to don pants/shoes and brief)  Toileting: None          Assessment  Performance deficits / Impairments: Decreased functional mobility ; Decreased ADL status; Decreased endurance;Decreased high-level IADLs;Decreased balance;Decreased coordination;Decreased sensation;Decreased posture;Decreased ROM; Decreased strength;Decreased fine motor control  Prognosis: Good  Discharge Recommendations: Patient would benefit from continued therapy after discharge  Activity Tolerance: Patient Tolerated treatment well  Safety Devices in place: Yes  Type of devices: Nurse notified; Left in chair;Call light within reach  Equipment Recommendations  Equipment Needed: Yes  Hand Held Shower: x  Transfer Tub Bench: x  Grab bars: x  Reacher: x  Sock Aid: X       Patient Education: ADL tasks, standing tolerance/balance with functional tasks, strengthening, DC Planning   Patient Goals   Patient goals : walk safely to be home alone while spouse at work  Learner:patient  Method: demonstration and explanation       Outcome: demonstrated understanding        Plan  Plan  Times per week: 5-7  Times per day: Twice a day  Current Treatment Recommendations: Strengthening,Patient/Caregiver Education & Training,Home Management Training,Equipment Evaluation, Education, & procurement,Balance Training,Functional Mobility Training,Endurance Training,Safety Education & Training,Self-Care / ADL  Patient Goals   Patient goals : walk safely to be home alone while spouse at work  Short term goals  Time Frame for Short term goals: 1 week  Short term goal 1: min assist LE self care  Short term goal 2: CGA amb to obtain set up for adls with good walker technique  Short term goal 3: joseph 8-9 min stand , ambulate for adls with RW and CGA  Short term goal 4: joseph 10 reps x 2 B shld ex with 1 lb weight on L and 2 lb weight on R  and 20 reps x 2 elbow ex with 2 lb wt on L and 3 lb on R for work joseph and UE strengthening. Short term goal 5: joseph repetitive  ex LUE with mod resistance for hand strengthening. Long term goals  Time Frame for Long term goals : by discharge  Long term goal 1: mod indep self care  Long term goal 2: mod indep amb to obtain set up for adls with good walker safety  Long term goal 3: joseph 11-13 min stand mod indep during adls  Long term goal 4: mod assist with transfer to tub to use tub seat vs remove shower doors for extended tub bench transfer with min assist.  Long term goal 5: mod indep with simple advanced adls with good walker safety and good balance. Long term goals 6: indep with BUE HEP for strengthening and coordination. Long term goal 7: increase L hand  strength to 40 pounds.         01/11/22 1347   OT Individual Minutes   Time In 1034   Time Out 9228   Minutes 93     Electronically signed by Margarito Klinefelter, COTA/L on 1/11/22 at 1:48 PM EST

## 2022-01-11 NOTE — PROGRESS NOTES
Physical Therapy  Facility/Department: [facility] ACUTE REHAB  Daily Treatment Note  NAME: Osvaldo Anthony  : 1960  MRN: [id number]    Date of Service: 2022    Discharge Recommendations:  Patient would benefit from continued therapy after discharge,Home with assist PRN        Assessment            Patient Diagnosis(es): There were no encounter diagnoses. has a past medical history of Anxiety, Arthritis, At maximum risk for fall, Cancer ([address] Cauda equina syndrome (HCC), Cervical stenosis of spine, GERD (gastroesophageal reflux disease), History of stomach ulcers, Hypertension, Hypothyroidism, Lumbar neuritis, Post laminectomy syndrome, Spinal deformity, and Thyroid disease. has a past surgical history that includes back surgery; hernia repair (Bilateral); Breast surgery (Right); cervical laminectomy (2014); Mastectomy, radical; Hysterectomy, total abdominal; and Lumbar spine surgery (N/A, 2021). Restrictions  Restrictions/Precautions  Restrictions/Precautions: General Precautions,Surgical Protocols,Fall Risk,Up as Tolerated  Required Braces or Orthoses?: No  Position Activity Restriction  Spinal Precautions: lumbar spine surgery  Other position/activity restrictions: Activity as tolerated.   per MD discharge papers pt OK to shower 3-4 days post surgery  Subjective              Orientation     Cognition      Objective   Bed mobility  Bridging: Contact guard assistance  Rolling to Left: Stand by assistance  Rolling to Right: Stand by assistance  Supine to Sit: Stand by assistance  Sit to Supine: Stand by assistance  Scooting: Stand by assistance  Transfers  Sit to Stand: Stand by assistance;Contact guard assistance  Stand to sit: Stand by assistance  Bed to Chair: Contact guard assistance;Stand by assistance  Stand Pivot Transfers: Contact guard assistance;Minimal Assistance  Ambulation 1  Surface: level tile  Device: Rolling Walker  Assistance: Stand by assistance;Contact guard assistance  Quality of Gait: patient attempts to hip flex and heel contact to toe off with deliberate attempt and concentration  Gait Deviations: Deviated path;Decreased step length;Decreased step height  Distance: 185 feet x2 am and again in pm.  Comments: No loss of balance  Ambulation 2  Surface - 2: ramp  Device 2: Rolling Walker  Assistance 2: Contact guard assistance  Quality of Gait 2: Decrease LLE foot clearance with slight circumduction. Cues to correct. Gait Deviations: Slow Sharon;Decreased step height  Distance: 140 feet  Stairs  # Steps : 15  Stairs Height: 6\"  Rails: Bilateral  Assistance: Contact guard assistance  Comment: step to pattern        Other exercises  Other exercises 2: LLE step up F/L on 4\" step x 10  Other exercises 3: RLE step down working on eccentric L quad control x 10  Other exercises 4: sit<>stand from EOM 3 different ways: x 5ea  Other exercises 5: virtual reality 38 minutes. Other exercises 6: Bridges x 10- CGA to stabilize LLE                        G-Code     OutComes Score                                                     AM-PAC Score             Goals  Short term goals  Time Frame for Short term goals: 1 week  Short term goal 1: Pt will ambulate 200 feet with a RW and SBA to increase functional independence. Short term goal 2: Pt able to perform bed mobility at SBA  Short term goal 3: Pt will demonstrate good- standing balance with rolling walker to decrease fall risk. Short term goal 4: Pt will perform sit<>stand and pivot transfer with rolling walker SBA to increase functional independence. Short term goal 5: Pt will negotiate 5 stair with 2 handrail and CGA to allow the pt to enter prior living arrangements.   Long term goals  Time Frame for Long term goals : By DC  Long term goal 1: Pt able to perform bed mobility mod-I  Long term goal 2: Pt able to transfer at mod-I  Long term goal 3: Pt able to ambulate with rolling walker distance of 150 ft, mod-I, level surfaces  Long term goal 4:  Pt able to ambulate on incline surface at SBA with rolling walker. Long term goal 5: Pt able to get up and down curb steps with UE support, CGA  Long term goal 6: Improve 2MWT distance to 100 ft to improve gait speed and overall fucntion. Long term goal 7: Pt able to go up and 12 steps with 2 UE support at SBA to improve B LE strength. Patient Goals   Patient goals : Go home soon    Plan    Plan  Times per week: 1.5 hr/day, 5 to 7 days/week.   Times per day: Daily  Current Treatment Recommendations: Strengthening,ROM,Stair training,Balance Training,Gait Training,Patient/Caregiver Education & Training,Equipment Evaluation, Education, & procurement,Neuromuscular Re-education,Functional Mobility Training,Endurance Training,Transfer Training,Safety Education & Training,Home Exercise Program  Safety Devices  Type of devices: Gait belt,Patient at risk for falls,All fall risk precautions in place  Restraints  Initially in place: No     Therapy Time     01/11/22 0900 01/11/22 1439   PT Individual Minutes   Time In [id number]   Time Out 0918 1407   Minutes 75 30     Tyra Kwan, PTA

## 2022-01-11 NOTE — PROGRESS NOTES
Physical Medicine & Rehabilitation  Progress Note      Subjective:      62year old female with cauda equina syndrome. Patient is doing well again today. She reports pain is well controlled on her home medications and she is infrequently using prn Norco. Constipation has resolved and she is having BM daily. She notes using virtual reality software in therapy. No new issues with sleep, appetite, bowel, or bladder. ROS:  Denies fevers, chills, sweats. No chest pain, palpitations, lightheadedness. Denies coughing, wheezing or shortness of breath. Denies abdominal pain, nausea, diarrhea or constipation. No new areas of joint pain. Denies new areas of numbness or weakness. Denies new anxiety or depression issues. No new skin problems. Rehabilitation:   Progressing in therapies. PT:  Restrictions/Precautions: General Precautions,Surgical Protocols,Fall Risk,Up as Tolerated  Spinal Precautions: lumbar spine surgery  Other position/activity restrictions: Activity as tolerated.   per MD discharge papers pt OK to shower 3-4 days post surgery   Transfers  Sit to Stand: Stand by assistance,Contact guard assistance  Stand to sit: Stand by assistance  Bed to Chair: Minimal assistance (Rolling walker)  Stand Pivot Transfers: Stand by assistance (w/RW)  Squat Pivot Transfers: Contact guard assistance (w/c<>mat table no AD)  Comment: Pt demos safe hand placment and technique when transfering with and without AD  Ambulation 1  Surface: level tile  Device: Rolling Walker  Assistance: Stand by assistance  Quality of Gait: patient attempts to hip flex and heel contact to toe off with deliberate attempt and concentration  Gait Deviations: Deviated path,Decreased step length,Decreased step height  Distance: 185 feet x2; 281 feet  Comments: No loss of balance    Transfers  Sit to Stand: Stand by assistance,Contact guard assistance  Stand to sit: Stand by assistance  Bed to Chair: Minimal assistance (Rolling walker)  Stand R/W, SBA req with no LOB noted  Comment: no LOB noted  Functional Mobility  Functional - Mobility Device: Rolling Walker  Activity: Retrieve items,Transport items,To/from bathroom,Other (pt demo'd good safety for item retrieval)  Assist Level: Stand by assistance  Functional Mobility Comments: slight ataxic gait noted/unsteady but no gross LOB noted     Bed mobility  Bridging: Contact guard assistance (to help stabilize LLE)  Rolling to Left: Stand by assistance  Rolling to Right: Minimal assistance  Supine to Sit: Minimal assistance  Sit to Supine: Moderate assistance  Scooting: Stand by assistance  Comment: HOB slightly elevated. Transfers  Stand Step Transfers: Stand by assistance (with R/W)  Stand Pivot Transfers: Minimal assistance  Sit to stand: Stand by assistance  Stand to sit: Stand by assistance  Transfer Comments: good hand placement noted   Toilet Transfers  Toilet - Technique: Ambulating  Equipment Used: Grab bars  Toilet Transfer: Contact guard assistance  Toilet Transfers Comments: with RW, good hand placement this date     Shower Transfers  Shower - Transfer From: Walker (rolling)  Shower - Transfer Type: To and From  Shower - Transfer To: Transfer tub bench  Shower - Technique: Ambulating  Shower Transfers: Contact Guard  Shower Transfers Comments: CGA/SBA, VCs for tech crossing threshold, good carryover noted, no LOB       SPEECH:      Objective:  BP (!) 149/77   Pulse 99   Temp 99.4 °F (37.4 °C) (Oral)   Resp 16   Ht 5' 1\" (1.549 m)   Wt 142 lb 6.4 oz (64.6 kg)   SpO2 98%   BMI 26.91 kg/m²       GEN: well developed, well nourished, NAD  HEENT: NCAT, PERRL, EOMI, mucous membranes pink and moist  CV: RRR, no murmurs, rubs or gallops  PULM: CTAB, no rales or rhonchi. Respirations WNL and unlabored  ABD: soft, NT, ND, BS+ and equal  NEURO: A&O x3. Sensation intact to light touch. MSK: Functional ROM all extremities . Strength 4+/5 key muscles BLEs, 5/5 key muscles BUEs.    EXTREMITIES: No calf tenderness to palpation bilaterally. No edema BLEs  SKIN: warm dry and intact with good turgor. Well approximated midline lumbosacral incision with sutures intact, mild erythema/skin reaction at sutures, no drainage or induration. PSYCH: appropriately interactive. Affect WNL. Diagnostics:     CBC: No results for input(s): WBC, RBC, HGB, HCT, MCV, RDW, PLT in the last 72 hours. BMP: No results for input(s): NA, K, CL, CO2, PHOS, BUN, CREATININE, CA, GLUCOSE in the last 72 hours. BNP: No results for input(s): BNP in the last 72 hours. PT/INR: No results for input(s): PROTIME, INR in the last 72 hours. APTT: No results for input(s): APTT in the last 72 hours. CARDIAC ENZYMES: No results for input(s): CKMB, CKMBINDEX, TROPONINT in the last 72 hours. Invalid input(s): CKTOTAL;3 troponins   FASTING LIPID PANEL:  Lab Results   Component Value Date    CHOL 198 07/23/2020     (A) 07/23/2020    TRIG 96 07/23/2020     LIVER PROFILE: No results for input(s): AST, ALT, ALB, BILIDIR, BILITOT, ALKPHOS in the last 72 hours.      Current Medications:   Current Facility-Administered Medications: docusate sodium (COLACE) capsule 200 mg, 200 mg, Oral, BID  senna (SENOKOT) tablet 17.2 mg, 2 tablet, Oral, BID  HYDROcodone-acetaminophen (NORCO) 7.5-325 MG per tablet 1 tablet, 1 tablet, Oral, Q8H PRN  bisacodyl (DULCOLAX) suppository 10 mg, 10 mg, Rectal, Daily  enoxaparin (LOVENOX) injection 40 mg, 40 mg, SubCUTAneous, Daily  ondansetron (ZOFRAN-ODT) disintegrating tablet 4 mg, 4 mg, Oral, Q8H PRN **OR** [DISCONTINUED] ondansetron (ZOFRAN) injection 4 mg, 4 mg, IntraVENous, Q6H PRN  baclofen (LIORESAL) tablet 10 mg, 10 mg, Oral, TID  busPIRone (BUSPAR) tablet 30 mg, 30 mg, Oral, BID  ciprofloxacin (CIPRO) tablet 500 mg, 500 mg, Oral, 2 times per day  dilTIAZem (CARDIZEM 12 HR) extended release capsule 180 mg, 180 mg, Oral, Daily  [Held by provider] ferrous sulfate (IRON 325) tablet 325 mg, 325 mg, Oral, BID WC  levothyroxine (SYNTHROID) tablet 88 mcg, 88 mcg, Oral, Daily  methadone (DOLOPHINE) tablet 10 mg, 10 mg, Oral, BID  pantoprazole (PROTONIX) tablet 40 mg, 40 mg, Oral, Daily  magnesium hydroxide (MILK OF MAGNESIA) 400 MG/5ML suspension 30 mL, 30 mL, Oral, Daily PRN  acetaminophen (TYLENOL) tablet 650 mg, 650 mg, Oral, Q4H PRN  polyethylene glycol (GLYCOLAX) packet 17 g, 17 g, Oral, Daily      Impression/Plan:   Impaired ADLs, gait, and mobility due to:      1. Cauda Equina Syndrome: treated with laminectomy decompression of spinal deformity at L2-S1 by Dr. Louisa Chavarria on 12/30/21. PT/OT  for gait, mobility, strengthening, endurance, ADLs, and self care. On Norco prn pain, baclofen TID for spasms. Will be post-op day 14 on 1/13/22 - will follow up with Dr. Louisa Chavarria whether OK to remove sutures that day. 2. Chronic pain: on Norco and methadone. Prescribed by Dr. Shakir Becerra as outpatient. 3. Pseudomonas UTI: on Ciprofloxacin until 1/13/21. Urine retention resolved at [facility], currently asymptomatic. 4. HTN: on Diltiazem  5. GERD: on pantoprazole  6. Hypothyroidism: on levothyroxine  7. Blood Loss Anemia: Hb low but stable. Monitoring. Ferrous sulfate on hold for previous constipation/ileus - bowel movements are daily now, can consider resuming. 8. Anxiety: on buspirone  9. Bowel Management/constipation: Miralax daily, Senokot prn, Dulcolax prn, Milk of Magnesia prn  10. Family medicine for medical management  11. DVT prophylaxis:  On Lovenox.  TEDs during the day, EPC cuffs overnight    Patient is seen in face to face evaluation today and will require the following durable medical equipment     BATH/SHOWER SEAT MISC    Bath/Shower Bench with back    Weight: Weight: 142 lb 6.4 oz (64.6 kg)  Diagnosis: Cauda Equina Syndrome  Duration: Purchase    Electronically signed by Marlyn Lorenz MD on 1/11/2022 at 11:11 AM      This note is created with the assistance of a speech recognition program.  While intending to generate a document that actually reflects the content of the visit, the document can still have some errors including those of syntax and sound a like substitutions which may escape proof reading. In such instances, actual meaning can be extrapolated by contextual diversion.

## 2022-01-11 NOTE — PLAN OF CARE
Problem: Pain:  Goal: Pain level will decrease  Description: Pain level will decrease  1/11/2022 0445 by Bernadette Bernheim, LPN  Outcome: Ongoing  1/10/2022 1825 by Marco Antonio Michaud RN  Outcome: Ongoing  Goal: Control of acute pain  Description: Control of acute pain  1/11/2022 0445 by Bernadette Bernheim, LPN  Outcome: Ongoing  1/10/2022 1825 by Marco Antonio Michaud RN  Outcome: Ongoing  Goal: Control of chronic pain  Description: Control of chronic pain  1/11/2022 0445 by Bernadette Bernheim, LPN  Outcome: Ongoing  1/10/2022 1825 by Marco Antonio Michaud RN  Outcome: Ongoing     Problem: Falls - Risk of:  Goal: Will remain free from falls  Description: Will remain free from falls  1/11/2022 0445 by Bernadette Bernheim, LPN  Outcome: Ongoing  1/10/2022 1825 by Marco Antonio Michaud RN  Outcome: Ongoing  Goal: Absence of physical injury  Description: Absence of physical injury  1/11/2022 0445 by Bernadette Bernheim, LPN  Outcome: Ongoing  1/10/2022 1825 by Marco Antonio Michaud RN  Outcome: Ongoing     Problem: Skin Integrity:  Goal: Will show no infection signs and symptoms  Description: Will show no infection signs and symptoms  1/11/2022 0445 by Bernadette Bernheim, LPN  Outcome: Ongoing  1/10/2022 1825 by Marco Antonio Michaud RN  Outcome: Ongoing  Goal: Absence of new skin breakdown  Description: Absence of new skin breakdown  1/11/2022 0445 by Bernadette Bernheim, LPN  Outcome: Ongoing  1/10/2022 1825 by Marco Antonio Michaud RN  Outcome: Ongoing     Problem: Musculor/Skeletal Functional Status  Goal: Highest potential functional level  1/11/2022 0445 by Bernadette Bernheim, LPN  Outcome: Ongoing  1/10/2022 1825 by Marco Antonio Michaud RN  Outcome: Ongoing  Goal: Absence of falls  1/11/2022 0445 by Bernadette Bernheim, LPN  Outcome: Ongoing  1/10/2022 1825 by Marco Antonio Michaud RN  Outcome: Ongoing     Problem: Musculor/Skeletal Functional Status  Goal: Highest potential functional level  1/11/2022 0445 by Bernadette Bernheim, LPN  Outcome: Ongoing  1/10/2022 1825 by Rolando Solorzano RN  Outcome: Ongoing  Goal: Absence of falls  1/11/2022 0445 by Collin Calvo LPN  Outcome: Ongoing  1/10/2022 1825 by Rolando Solorzano RN  Outcome: Ongoing     Problem: Nutrition  Goal: Optimal nutrition therapy  1/11/2022 0445 by Collin Calvo LPN  Outcome: Ongoing  1/10/2022 1825 by Rolando Solorzano RN  Outcome: Ongoing  1/10/2022 1458 by Paulo Ferreira, TR, LD  Outcome: Ongoing

## 2022-01-11 NOTE — PLAN OF CARE
Problem: Pain:  Goal: Pain level will decrease  Description: Pain level will decrease  1/11/2022 1532 by Angelina Jc RN  Outcome: Ongoing     Problem: Falls - Risk of:  Goal: Will remain free from falls  Description: Will remain free from falls  1/11/2022 1532 by Angelina Jc RN  Outcome: Ongoing     Problem: Skin Integrity:  Goal: Will show no infection signs and symptoms  Description: Will show no infection signs and symptoms  1/11/2022 1532 by Angelina Jc RN  Outcome: Ongoing

## 2022-01-12 LAB
ANION GAP SERPL CALCULATED.3IONS-SCNC: 8 MMOL/L (ref 9–17)
BUN BLDV-MCNC: 9 MG/DL (ref 8–23)
BUN/CREAT BLD: ABNORMAL (ref 9–20)
CALCIUM SERPL-MCNC: 9.2 MG/DL (ref 8.6–10.4)
CHLORIDE BLD-SCNC: 103 MMOL/L (ref 98–107)
CO2: 28 MMOL/L (ref 20–31)
CREAT SERPL-MCNC: 0.82 MG/DL (ref 0.5–0.9)
GFR AFRICAN AMERICAN: >60 ML/MIN
GFR NON-AFRICAN AMERICAN: >60 ML/MIN
GFR SERPL CREATININE-BSD FRML MDRD: ABNORMAL ML/MIN/{1.73_M2}
GFR SERPL CREATININE-BSD FRML MDRD: ABNORMAL ML/MIN/{1.73_M2}
GLUCOSE BLD-MCNC: 118 MG/DL (ref 70–99)
HCT VFR BLD CALC: 27.2 % (ref 36–46)
HEMOGLOBIN: 9.4 G/DL (ref 12–16)
MCH RBC QN AUTO: 32.7 PG (ref 26–34)
MCHC RBC AUTO-ENTMCNC: 34.7 G/DL (ref 31–37)
MCV RBC AUTO: 94.4 FL (ref 80–100)
NRBC AUTOMATED: ABNORMAL PER 100 WBC
PDW BLD-RTO: 13.3 % (ref 11.5–14.9)
PLATELET # BLD: 414 K/UL (ref 150–450)
PMV BLD AUTO: 6.7 FL (ref 6–12)
POTASSIUM SERPL-SCNC: 4.2 MMOL/L (ref 3.7–5.3)
RBC # BLD: 2.89 M/UL (ref 4–5.2)
SODIUM BLD-SCNC: 139 MMOL/L (ref 135–144)
WBC # BLD: 3.9 K/UL (ref 3.5–11)

## 2022-01-12 PROCEDURE — 97530 THERAPEUTIC ACTIVITIES: CPT

## 2022-01-12 PROCEDURE — 6370000000 HC RX 637 (ALT 250 FOR IP): Performed by: PHYSICAL MEDICINE & REHABILITATION

## 2022-01-12 PROCEDURE — 6370000000 HC RX 637 (ALT 250 FOR IP): Performed by: NURSE PRACTITIONER

## 2022-01-12 PROCEDURE — 97116 GAIT TRAINING THERAPY: CPT

## 2022-01-12 PROCEDURE — 6360000002 HC RX W HCPCS: Performed by: NURSE PRACTITIONER

## 2022-01-12 PROCEDURE — 99232 SBSQ HOSP IP/OBS MODERATE 35: CPT | Performed by: PHYSICAL MEDICINE & REHABILITATION

## 2022-01-12 PROCEDURE — 97535 SELF CARE MNGMENT TRAINING: CPT

## 2022-01-12 PROCEDURE — 36415 COLL VENOUS BLD VENIPUNCTURE: CPT

## 2022-01-12 PROCEDURE — 1180000000 HC REHAB R&B

## 2022-01-12 PROCEDURE — 85027 COMPLETE CBC AUTOMATED: CPT

## 2022-01-12 PROCEDURE — 97110 THERAPEUTIC EXERCISES: CPT

## 2022-01-12 PROCEDURE — 80048 BASIC METABOLIC PNL TOTAL CA: CPT

## 2022-01-12 RX ADMIN — FERROUS SULFATE TAB 325 MG (65 MG ELEMENTAL FE) 325 MG: 325 (65 FE) TAB at 18:07

## 2022-01-12 RX ADMIN — CIPROFLOXACIN 500 MG: 500 TABLET, FILM COATED ORAL at 20:34

## 2022-01-12 RX ADMIN — SENNOSIDES 17.2 MG: 8.6 TABLET, COATED ORAL at 09:24

## 2022-01-12 RX ADMIN — DOCUSATE SODIUM 200 MG: 100 CAPSULE, LIQUID FILLED ORAL at 09:24

## 2022-01-12 RX ADMIN — BACLOFEN 10 MG: 10 TABLET ORAL at 20:31

## 2022-01-12 RX ADMIN — BUSPIRONE HYDROCHLORIDE 30 MG: 15 TABLET ORAL at 20:31

## 2022-01-12 RX ADMIN — PANTOPRAZOLE SODIUM 40 MG: 40 TABLET, DELAYED RELEASE ORAL at 06:12

## 2022-01-12 RX ADMIN — SENNOSIDES 17.2 MG: 8.6 TABLET, COATED ORAL at 20:32

## 2022-01-12 RX ADMIN — METHADONE HYDROCHLORIDE 10 MG: 10 TABLET ORAL at 20:31

## 2022-01-12 RX ADMIN — ENOXAPARIN SODIUM 40 MG: 100 INJECTION SUBCUTANEOUS at 09:24

## 2022-01-12 RX ADMIN — BACLOFEN 10 MG: 10 TABLET ORAL at 16:09

## 2022-01-12 RX ADMIN — LEVOTHYROXINE SODIUM 88 MCG: 0.09 TABLET ORAL at 06:12

## 2022-01-12 RX ADMIN — DILTIAZEM HYDROCHLORIDE 180 MG: 90 CAPSULE, EXTENDED RELEASE ORAL at 09:31

## 2022-01-12 RX ADMIN — BUSPIRONE HYDROCHLORIDE 30 MG: 15 TABLET ORAL at 09:33

## 2022-01-12 RX ADMIN — DOCUSATE SODIUM 200 MG: 100 CAPSULE, LIQUID FILLED ORAL at 20:32

## 2022-01-12 RX ADMIN — FERROUS SULFATE TAB 325 MG (65 MG ELEMENTAL FE) 325 MG: 325 (65 FE) TAB at 09:30

## 2022-01-12 RX ADMIN — METHADONE HYDROCHLORIDE 10 MG: 10 TABLET ORAL at 09:25

## 2022-01-12 RX ADMIN — BACLOFEN 10 MG: 10 TABLET ORAL at 09:25

## 2022-01-12 RX ADMIN — CIPROFLOXACIN 500 MG: 500 TABLET, FILM COATED ORAL at 09:26

## 2022-01-12 ASSESSMENT — PAIN DESCRIPTION - LOCATION: LOCATION: GENERALIZED

## 2022-01-12 ASSESSMENT — PAIN SCALES - GENERAL
PAINLEVEL_OUTOF10: 5
PAINLEVEL_OUTOF10: 7
PAINLEVEL_OUTOF10: 7

## 2022-01-12 ASSESSMENT — PAIN DESCRIPTION - PAIN TYPE: TYPE: CHRONIC PAIN

## 2022-01-12 ASSESSMENT — PAIN DESCRIPTION - DIRECTION: RADIATING_TOWARDS: LEGS

## 2022-01-12 NOTE — PLAN OF CARE
Problem: Pain:  Goal: Pain level will decrease  Description: Pain level will decrease  1/12/2022 1743 by Selinda Relic II, LPN  Outcome: Ongoing  1/12/2022 0529 by Kenzie Chika LPN  Outcome: Ongoing  Goal: Control of acute pain  Description: Control of acute pain  1/12/2022 1743 by Selinda Relic II, LPN  Outcome: Ongoing  1/12/2022 0529 by Kenzie Chika LPN  Outcome: Ongoing  Goal: Control of chronic pain  Description: Control of chronic pain  1/12/2022 1743 by Selinda Relic II, LPN  Outcome: Ongoing  1/12/2022 0529 by Kenzie Chika LPN  Outcome: Ongoing     Problem: Falls - Risk of:  Goal: Will remain free from falls  Description: Will remain free from falls  1/12/2022 1743 by Selinda Relic II, LPN  Outcome: Ongoing  1/12/2022 0529 by Kenzie Chika LPN  Outcome: Ongoing  Goal: Absence of physical injury  Description: Absence of physical injury  1/12/2022 1743 by Christininda Relic II, LPN  Outcome: Ongoing  1/12/2022 0529 by Kenzie Farr LPN  Outcome: Ongoing     Problem: Skin Integrity:  Goal: Will show no infection signs and symptoms  Description: Will show no infection signs and symptoms  1/12/2022 1743 by Selinda Relic II, LPN  Outcome: Ongoing  1/12/2022 0529 by Kenzie Farr LPN  Outcome: Ongoing  Goal: Absence of new skin breakdown  Description: Absence of new skin breakdown  1/12/2022 1743 by Selinda Relic II, LPN  Outcome: Ongoing  1/12/2022 0529 by Kenzie Chika LPN  Outcome: Ongoing     Problem: Musculor/Skeletal Functional Status  Goal: Highest potential functional level  1/12/2022 1743 by Selinda Relic II, LPN  Outcome: Ongoing  1/12/2022 0529 by Kenzie Farr LPN  Outcome: Ongoing  Goal: Absence of falls  1/12/2022 1743 by Pineda Christianson LPN  Outcome: Ongoing  1/12/2022 0529 by Kenzie Farr LPN  Outcome: Ongoing     Problem: Musculor/Skeletal Functional Status  Goal: Highest potential functional level  1/12/2022 1743 by Pineda Christianson LPN  Outcome: Ongoing  1/12/2022 0529 by Johny Ivey LPN  Outcome: Ongoing  Goal: Absence of falls  1/12/2022 1743 by Malachi Martinez II, LPN  Outcome: Ongoing  1/12/2022 0529 by Johny Ivey LPN  Outcome: Ongoing     Problem: Nutrition  Goal: Optimal nutrition therapy  1/12/2022 1743 by Malachi Martinez II, LPN  Outcome: Ongoing  1/12/2022 0529 by Johny Ivey LPN  Outcome: Ongoing

## 2022-01-12 NOTE — PROGRESS NOTES
[address] [facility]    ACUTE REHABILITATION OCCUPATIONAL THERAPY  DAILY NOTE    Date: 22  Patient Name: Katelynn Benedict      Room: 9821/5714-13    MRN: [id number]   : 1960  (62 y.o.)  Gender: female      Diagnosis: Lumbar stenosis with neurogenic claudication, L2-S1 laminectomy on 21 for cauda equina syndrome (Dr. Barbara Robles), Cervical stenosis with myelopathy, decreased BLE strength , L weaker than R,       Restrictions  Restrictions/Precautions: General Precautions,Surgical Protocols,Fall Risk,Up as Tolerated  Spinal Precautions: lumbar spine surgery  Other position/activity restrictions: Activity as tolerated. per MD discharge papers pt OK to shower 3-4 days post surgery  Required Braces or Orthoses?: No    Subjective  Subjective: Page Yordan worked me pretty good so this leg is pretty tired today\"  Verbalizes in regrds to L LE  Comments: pt reports fatigue with L foot this date, noted dragging of L foot with mobility  Patient Currently in Pain: Yes  Pain Level: 5  Pain Location: Generalized     Overall Orientation Status: Within Functional Limits     Pain Assessment  Pain Assessment: 0-10  Pain Level: 5  Pain Type: Chronic pain (\"all over\")  Pain Location: Generalized  Pain Radiating Towards: legs    Objective  Cognition  Overall Cognitive Status: WFL  Perception  Overall Perceptual Status: WFL  Balance  Sitting Balance: Modified independent   Standing Balance: Stand by assistance (SBA w UE support for balance )  Transfers  Stand Step Transfers: Stand by assistance  Sit to stand: Contact guard assistance;Stand by assistance  Stand to sit: Contact guard assistance;Stand by assistance  Transfer Comments:  AM: W/RW; good hand placement noted;PM: Pt voice concern this PM about feeling nervous about transfering from different types of chairs once at private residence.  Sit<> stand trials with various types of furniture (pieces with arms and without arms) facilited  this PM to increase overall safety and independence during functional transfers. Pt SBA sit<>stand with arms (3/3) with increased ease. Sit<>stand trial without arms for support ( 3/5). Trial 1: pt req MIN A Sit>stand/ SBA stand>sit; Trial 2: CGA Sit>stand/ SBA Stand>sit; Trial 3 CGA sit>stand/SBA stand>sit. pt demonstrated 2 posterior LOB unable to complete transfer. VC provided for hand and foot placement to increase safety during functional transfers. G tolerance noted throughout activity. Standing Balance  Time: AM: <1 min x1, 1-2 mins x3; PM: <1 min x6  Activity: AM: Functional mobility/transfer in room/bathroom, item retrieval for ADL setup, showering/toileting tasks; PM: functional mobility around rom and Sit<> stand trials  Comment: AM: no LOB noted; PM: LOB posteriorly x2   Functional Mobility  Functional - Mobility Device: Rolling Walker  Activity: Retrieve items;Transport items; To/from bathroom; Other (Demo'd G safety awareness this date)  Assist Level: Stand by assistance  Functional Mobility Comments: slight ataxic gait noted/unsteady but no gross LOB noted  Toilet Transfers  Toilet - Technique: Ambulating  Equipment Used: Grab bars  Toilet Transfer: Stand by assistance  Toilet Transfers Comments: with RW, good hand placement this date  Shower Transfers  Shower - Transfer From: Walker (Rolling)  Shower - Transfer Type: To and From  Shower - Transfer To:  Transfer tub bench  Shower - Technique: Ambulating  Shower Transfers: Stand by assistance  Shower Transfers Comments: close SBA, VCs for tech and increased stride height crossing threshold, good carryover noted, no LOB     Type of ROM/Therapeutic Exercise  Type of ROM/Therapeutic Exercise: Free weights (1# L UE, 2# R UE x15 reps)  Comment: pt engaged in BUE ex's to support mobility/transfers and overall endurance, rest breaks req but demo'd good tolerance, all within precautions  Exercises  Scapular Protraction: x  Scapular Retraction: x  Shoulder Flexion: x  Shoulder Extension: x  Shoulder ABduction: x  Shoulder ADduction: x  Horizontal ABduction: x  Horizontal ADduction: x  Elbow Flexion: x  Elbow Extension: x  Supination: X  Pronation: X                       ADL  Equipment Provided: Long-handled sponge  Feeding: Modified independent ;Dentures  Grooming: Modified independent  (seated w/c sinklevel)  UE Bathing: Modified independent   LE Bathing: Stand by assistance (SBA for standing for juanita area)  UE Dressing: Modified independent   LE Dressing: Minimal assistance;Setup (A for TEDs only, able to ronny/doff pants/brief/shoes)  Toileting: None (did not request to use this date )  Additional Comments: SBA standing with UE support for balance maintenance, sock aid utilized ,  Able to ronny/doff pants/brief without AE this date displaying figure 4 tech, G safety awareness using GBs PRN, VC for increase stride height for over shower threshold. Assessment  Performance deficits / Impairments: Decreased functional mobility ; Decreased ADL status; Decreased endurance;Decreased high-level IADLs;Decreased balance;Decreased coordination;Decreased sensation;Decreased posture;Decreased ROM; Decreased strength;Decreased fine motor control  Prognosis: Good  Discharge Recommendations: Patient would benefit from continued therapy after discharge  Activity Tolerance: Patient Tolerated treatment well  Safety Devices in place: Yes  Type of devices: Left in chair;Call light within reach; All fall risk precautions in place; Patient at risk for falls  Restraints  Initially in place: No  Equipment Recommendations  Equipment Needed: Yes  Hand Held Shower: x  Transfer Tub Bench: x  Grab bars: x  Reacher: x  Sock Aid: X       Patient Education: Sit<> stand trials to increase safety and independence for functional transfers, safety during functional mobility and shower/toilet transfers, ADL tasks, BUE HEP, ADLs    Patient Goals   Patient goals : walk safely to be home alone while spouse at work  Learner:patient  Method: demonstration and explanation       Outcome: needs reinforcement        Plan  Plan  Times per week: 5-7  Times per day: Twice a day  Current Treatment Recommendations: Strengthening,Patient/Caregiver Education & Training,Home Management Training,Equipment Evaluation, Education, & procurement,Balance Training,Functional Mobility Training,Endurance Training,Safety Education & Training,Self-Care / ADL  Patient Goals   Patient goals : walk safely to be home alone while spouse at work  Short term goals  Time Frame for Short term goals: 1 week  Short term goal 1: min assist LE self care  Short term goal 2: CGA amb to obtain set up for adls with good walker technique  Short term goal 3: joseph 8-9 min stand , ambulate for adls with RW and CGA  Short term goal 4: joseph 10 reps x 2 B shld ex with 1 lb weight on L and 2 lb weight on R  and 20 reps x 2 elbow ex with 2 lb wt on L and 3 lb on R for work joseph and UE strengthening. Short term goal 5: joseph repetitive  ex LUE with mod resistance for hand strengthening. Long term goals  Time Frame for Long term goals : by discharge  Long term goal 1: mod indep self care  Long term goal 2: mod indep amb to obtain set up for adls with good walker safety  Long term goal 3: joseph 11-13 min stand mod indep during adls  Long term goal 4: mod assist with transfer to tub to use tub seat vs remove shower doors for extended tub bench transfer with min assist.  Long term goal 5: mod indep with simple advanced adls with good walker safety and good balance. Long term goals 6: indep with BUE HEP for strengthening and coordination. Long term goal 7: increase L hand  strength to 40 pounds.         01/12/22 1002 01/12/22 1401   OT Individual Minutes   Time In 1002 1401   Time Out 1100 1430   Minutes 58 29     Electronically signed by GOOD Mccallum  on 1/12/22 at 3:44 PM EST

## 2022-01-12 NOTE — PROGRESS NOTES
Physical Therapy  Facility/Department: [facility] ACUTE REHAB  Daily Treatment Note  NAME: Mohini Cuello  : 1960  MRN: [id number]    Date of Service: 2022    Discharge Recommendations:  Patient would benefit from continued therapy after discharge,Home with assist PRN        Assessment      Activity Tolerance  Activity Tolerance: Patient Tolerated treatment well     Patient Diagnosis(es): There were no encounter diagnoses. has a past medical history of Anxiety, Arthritis, At maximum risk for fall, Cancer ([address] Cauda equina syndrome (HCC), Cervical stenosis of spine, GERD (gastroesophageal reflux disease), History of stomach ulcers, Hypertension, Hypothyroidism, Lumbar neuritis, Post laminectomy syndrome, Spinal deformity, and Thyroid disease. has a past surgical history that includes back surgery; hernia repair (Bilateral); Breast surgery (Right); cervical laminectomy (2014); Mastectomy, radical; Hysterectomy, total abdominal; and Lumbar spine surgery (N/A, 2021). Restrictions  Restrictions/Precautions  Restrictions/Precautions: General Precautions,Surgical Protocols,Fall Risk,Up as Tolerated  Required Braces or Orthoses?: No  Position Activity Restriction  Spinal Precautions: lumbar spine surgery  Other position/activity restrictions: Activity as tolerated. per MD discharge papers pt OK to shower 3-4 days post surgery  Subjective   General  Response To Previous Treatment: Patient with no complaints from previous session. Family / Caregiver Present: No  Subjective  Subjective: patient reporting feeling good today.  Continues to focus on LLE strengthening and improving coordination and placement of LLE with gait  Pain Screening  Patient Currently in Pain: Denies  Vital Signs  Patient Currently in Pain: Denies       Orientation     Cognition      Objective   Bed mobility  Bridging: Modified independent   Rolling to Left: Modified independent   Rolling to Right: Modified independent   Supine to Sit: Modified independent   Sit to Supine: Modified independent   Scooting: Modified independent  Comment: patient reports does not plan to sleep in bed prefers her recliner chair for comfort  Transfers  Sit to Stand: Stand by assistance  Stand to sit: Stand by assistance  Bed to Chair: Stand by assistance  Comment: attempt with floor tranfers required max A of 1 and patient became panicy. patient recovered once seated safely to chair. Able to demonstrate transfer with modification with stool to chair vs floor to chair. Ambulation 1  Surface: level tile  Device: Rolling Walker  Assistance: Supervision  Quality of Gait: patient attempts to hip flex and heel contact to toe off with deliberate attempt and concentration  Distance: 2MWT 190 feet; 210 feet tolerated before needed rest break  Comments: Mirror therapy helped patient to improve technique with LLE swing phase. Patient pleased to see progress  Ambulation 2  Surface - 2: ramp  Device 2: Rolling Walker  Assistance 2: Stand by assistance;Contact guard assistance  Gait Deviations: Deviated path  Distance: 200 feet  Comments: control speed appropriately and clear foot  Stairs  # Steps : 12  Stairs Height: 8\"  Rails: Bilateral;Left ascending (8 steps BHR 4 steps Left HR)  Curbs: 8\"  Device: Rolling walker  Assistance: Contact guard assistance  Comment: step to pattern; patient states does not preform flight of steps at home. Other exercises  Other exercises 1: seated unsupported trunk 6 minutes; seated supported trunk 17 minutes; standing one UE support 2 minutes EOM BUE ROM/Trunk ROM/stabilization VR system on activities tolerated  25 minutes  Other exercises 2: Seated BLE circulation 20-30 reps  Other exercises 3: RLE step down working on eccentric L quad control in stance x 10;  Step up LLE  Other exercises 4: sit<>stand from EOM 3 different ways: x 5ea  Other exercises 5: push ups seated 5 x2 sets  Other exercises 6: Bridges x 10- CGA to stabilize LLE G-Code     OutComes Score                                                     AM-PAC Score             Goals  Short term goals  Time Frame for Short term goals: 1 week  Short term goal 1: Pt will ambulate 200 feet with a RW and SBA to increase functional independence. Short term goal 2: Pt able to perform bed mobility at SBA  Short term goal 3: Pt will demonstrate good- standing balance with rolling walker to decrease fall risk. Short term goal 4: Pt will perform sit<>stand and pivot transfer with rolling walker SBA to increase functional independence. Short term goal 5: Pt will negotiate 5 stair with 2 handrail and CGA to allow the pt to enter prior living arrangements. Long term goals  Time Frame for Long term goals : By DC  Long term goal 1: Pt able to perform bed mobility mod-I  Long term goal 2: Pt able to transfer at mod-I  Long term goal 3: Pt able to ambulate with rolling walker distance of 150 ft, mod-I, level surfaces  Long term goal 4:  Pt able to ambulate on incline surface at SBA with rolling walker. Long term goal 5: Pt able to get up and down curb steps with UE support, CGA  Long term goal 6: Improve 2MWT distance to 100 ft to improve gait speed and overall fucntion. Long term goal 7: Pt able to go up and 12 steps with 2 UE support at SBA to improve B LE strength. Patient Goals   Patient goals : Go home soon    Plan    Plan  Times per week: 1.5 hr/day, 5 to 7 days/week.   Times per day: Daily  Current Treatment Recommendations: Strengthening,ROM,Stair training,Balance Training,Gait Training,Patient/Caregiver Education & Training,Equipment Evaluation, Education, & procurement,Neuromuscular Re-education,Functional Mobility Training,Endurance Training,Transfer Training,Safety Education & Training,Home Exercise Program  Safety Devices  Type of devices: Gait belt,Patient at risk for falls,All fall risk precautions in place  Restraints  Initially in place: No     Therapy Time     01/12/22 0816 01/12/22 1259   PT Individual Minutes   Time In 42 Emilia   Time Out 0908 1345   Minutes 64 46   PT Concurrent Minutes   Time In  --  3430   Time Out  --  1400   Minutes  --  15     Tyra Kwan, PTA

## 2022-01-12 NOTE — PLAN OF CARE
Problem: Pain:  Goal: Pain level will decrease  Description: Pain level will decrease  1/12/2022 0529 by Nani Lu LPN  Outcome: Ongoing  1/11/2022 1532 by Franc Rose RN  Outcome: Ongoing  Goal: Control of acute pain  Description: Control of acute pain  Outcome: Ongoing  Goal: Control of chronic pain  Description: Control of chronic pain  Outcome: Ongoing     Problem: Falls - Risk of:  Goal: Will remain free from falls  Description: Will remain free from falls  1/12/2022 0529 by Nani Lu LPN  Outcome: Ongoing  1/11/2022 1532 by Franc Rose RN  Outcome: Ongoing  Goal: Absence of physical injury  Description: Absence of physical injury  Outcome: Ongoing     Problem: Skin Integrity:  Goal: Will show no infection signs and symptoms  Description: Will show no infection signs and symptoms  1/12/2022 0529 by Nani Lu LPN  Outcome: Ongoing  1/11/2022 1532 by Franc Rose RN  Outcome: Ongoing     Problem: Skin Integrity:  Goal: Absence of new skin breakdown  Description: Absence of new skin breakdown  Outcome: Ongoing     Problem: Musculor/Skeletal Functional Status  Goal: Highest potential functional level  Outcome: Ongoing  Goal: Absence of falls  Outcome: Ongoing     Problem: Musculor/Skeletal Functional Status  Goal: Highest potential functional level  Outcome: Ongoing  Goal: Absence of falls  Outcome: Ongoing     Problem: Nutrition  Goal: Optimal nutrition therapy  Outcome: Ongoing

## 2022-01-12 NOTE — PROGRESS NOTES
Physical Medicine & Rehabilitation  Progress Note      Subjective:      62year old female with cauda equina syndrome. Patient is doing well again today. She continues to note improved strength and function in her L leg. No new issues with pain, sleep, appetite, bowel, or bladder. ROS:  Denies fevers, chills, sweats. No chest pain, palpitations, lightheadedness. Denies coughing, wheezing or shortness of breath. Denies abdominal pain, nausea, diarrhea or constipation. No new areas of joint pain. Denies new areas of numbness or weakness. Denies new anxiety or depression issues. No new skin problems. Rehabilitation:   Progressing in therapies. PT:  Restrictions/Precautions: General Precautions,Surgical Protocols,Fall Risk,Up as Tolerated  Spinal Precautions: lumbar spine surgery  Other position/activity restrictions: Activity as tolerated.   per MD discharge papers pt OK to shower 3-4 days post surgery   Transfers  Sit to Stand: Stand by assistance  Stand to sit: Stand by assistance  Bed to Chair: Stand by assistance  Stand Pivot Transfers: Contact guard assistance,Minimal Assistance  Squat Pivot Transfers: Contact guard assistance (w/c<>mat table no AD)  Comment: Pt alyssia safe hand placment and technique when transfering with and without AD  Ambulation 1  Surface: level tile  Device: Rolling Walker  Assistance: Supervision  Quality of Gait: patient attempts to hip flex and heel contact to toe off with deliberate attempt and concentration  Gait Deviations: Deviated path,Decreased step length,Decreased step height  Distance: 2MWT 190 feet; 210 feet tolerated before needed rest break  Comments: No loss of balance    Transfers  Sit to Stand: Stand by assistance  Stand to sit: Stand by assistance  Bed to Chair: Stand by assistance  Stand Pivot Transfers: Contact guard assistance,Minimal Assistance  Squat Pivot Transfers: Contact guard assistance (w/c<>mat table no AD)  Comment: Pt alyssia safe hand placment and technique when transfering with and without AD  Ambulation  Ambulation?: Yes  More Ambulation?: Yes  Ambulation 1  Surface: level tile  Device: Rolling Walker  Assistance: Supervision  Quality of Gait: patient attempts to hip flex and heel contact to toe off with deliberate attempt and concentration  Gait Deviations: Deviated path,Decreased step length,Decreased step height  Distance: 2MWT 190 feet; 210 feet tolerated before needed rest break  Comments: No loss of balance    Surface: level tile  Ambulation 1  Surface: level tile  Device: Rolling Walker  Assistance: Supervision  Quality of Gait: patient attempts to hip flex and heel contact to toe off with deliberate attempt and concentration  Gait Deviations: Deviated path,Decreased step length,Decreased step height  Distance: 2MWT 190 feet; 210 feet tolerated before needed rest break  Comments: No loss of balance    OT:  ADL  Equipment Provided: Reacher,Long-handled sponge  Feeding: Modified independent ,Dentures  Grooming: Stand by assistance (SBA/SUP standing at sink with UE support)  UE Bathing: Modified independent   LE Bathing: Stand by assistance  UE Dressing: Modified independent   LE Dressing: Minimal assistance,Setup (A TEDs only, pt able to don pants/shoes and brief)  Toileting: None  Additional Comments: CGA/SBA standing, varies with task, reacher/sock aid utilized, A to doff pants/footies due to tightness of material-attempted with reacher, VCs for safety in standing/using GB's         Balance  Sitting Balance: Modified independent   Standing Balance: Stand by assistance (SBA dynamic/SUP static, UE support)   Standing Balance  Time: AM: 1-2 min x3, 4-5 min, 1 min x2, 3-4 min, 1 min  Activity: AM: functional mobility/transfers in room/bathroom, toileting tasks, item retrieval for ADL setup, grooming at sink, self care tasks, dynamic standing to participate in gentle reaching task, crossing midline to participate in task, 1-2 UE support on table as needed with no LOB noted, SUP req, good tolerance noted  Comment: no LOB noted  Functional Mobility  Functional - Mobility Device: Rolling Walker  Activity: Retrieve items,Transport items,To/from bathroom,Other (good safety for item retrieval )  Assist Level: Stand by assistance  Functional Mobility Comments: slight ataxic gait noted/unsteady but no gross LOB noted     Bed mobility  Bridging: Contact guard assistance  Rolling to Left: Stand by assistance  Rolling to Right: Stand by assistance  Supine to Sit: Stand by assistance  Sit to Supine: Stand by assistance  Scooting: Stand by assistance  Comment: HOB slightly elevated. Transfers  Stand Step Transfers: Stand by assistance (with R/W)  Stand Pivot Transfers: Minimal assistance  Sit to stand: Supervision  Stand to sit: Supervision  Transfer Comments: good hand placement noted   Toilet Transfers  Toilet - Technique: Ambulating  Equipment Used: Grab bars  Toilet Transfer: Stand by assistance  Toilet Transfers Comments: with RW, good hand placement this date     Shower Transfers  Shower - Transfer From: Walker (rolling)  Shower - Transfer Type: To and From  Shower - Transfer To: Transfer tub bench  Shower - Technique: Ambulating  Shower Transfers: Stand by assistance  Shower Transfers Comments: close SBA, VCs for tech crossing threshold, good carryover noted, no LOB       SPEECH:      Objective:  BP (!) 144/73   Pulse 93   Temp 98.2 °F (36.8 °C)   Resp 16   Ht 5' 1\" (1.549 m)   Wt 142 lb 6.4 oz (64.6 kg)   SpO2 96%   BMI 26.91 kg/m²       GEN: well developed, well nourished, NAD  HEENT: NCAT, PERRL, EOMI, mucous membranes pink and moist  CV: RRR, no murmurs, rubs or gallops  PULM: CTAB, no rales or rhonchi. Respirations WNL and unlabored  ABD: soft, NT, ND, BS+ and equal  NEURO: A&O x3. Sensation intact to light touch. MSK: Functional ROM all extremities . Strength 5/5 key muscles RLE, 4+/5 key muscles LLE, 5/5 key muscles BUEs.    EXTREMITIES: No calf tenderness to palpation bilaterally. No edema BLEs  SKIN: warm dry and intact with good turgor. Well approximated midline lumbosacral incision with sutures intact. PSYCH: appropriately interactive. Affect WNL. Diagnostics:     CBC:   Recent Labs     01/12/22  0643   WBC 3.9   RBC 2.89*   HGB 9.4*   HCT 27.2*   MCV 94.4   RDW 13.3        BMP:   Recent Labs     01/12/22  0643      K 4.2      CO2 28   BUN 9   CREATININE 0.82   GLUCOSE 118*     BNP: No results for input(s): BNP in the last 72 hours. PT/INR: No results for input(s): PROTIME, INR in the last 72 hours. APTT: No results for input(s): APTT in the last 72 hours. CARDIAC ENZYMES: No results for input(s): CKMB, CKMBINDEX, TROPONINT in the last 72 hours. Invalid input(s): CKTOTAL;3 troponins   FASTING LIPID PANEL:  Lab Results   Component Value Date    CHOL 198 07/23/2020     (A) 07/23/2020    TRIG 96 07/23/2020     LIVER PROFILE: No results for input(s): AST, ALT, ALB, BILIDIR, BILITOT, ALKPHOS in the last 72 hours.      Current Medications:   Current Facility-Administered Medications: polyethylene glycol (GLYCOLAX) packet 17 g, 17 g, Oral, Daily PRN  bisacodyl (DULCOLAX) suppository 10 mg, 10 mg, Rectal, Daily PRN  docusate sodium (COLACE) capsule 200 mg, 200 mg, Oral, BID  senna (SENOKOT) tablet 17.2 mg, 2 tablet, Oral, BID  HYDROcodone-acetaminophen (NORCO) 7.5-325 MG per tablet 1 tablet, 1 tablet, Oral, Q8H PRN  bisacodyl (DULCOLAX) suppository 10 mg, 10 mg, Rectal, Daily  enoxaparin (LOVENOX) injection 40 mg, 40 mg, SubCUTAneous, Daily  ondansetron (ZOFRAN-ODT) disintegrating tablet 4 mg, 4 mg, Oral, Q8H PRN **OR** [DISCONTINUED] ondansetron (ZOFRAN) injection 4 mg, 4 mg, IntraVENous, Q6H PRN  baclofen (LIORESAL) tablet 10 mg, 10 mg, Oral, TID  busPIRone (BUSPAR) tablet 30 mg, 30 mg, Oral, BID  ciprofloxacin (CIPRO) tablet 500 mg, 500 mg, Oral, 2 times per day  dilTIAZem (CARDIZEM 12 HR) extended release capsule 180 mg, 180 mg, Oral, Daily  ferrous sulfate (IRON 325) tablet 325 mg, 325 mg, Oral, BID WC  levothyroxine (SYNTHROID) tablet 88 mcg, 88 mcg, Oral, Daily  methadone (DOLOPHINE) tablet 10 mg, 10 mg, Oral, BID  pantoprazole (PROTONIX) tablet 40 mg, 40 mg, Oral, Daily  magnesium hydroxide (MILK OF MAGNESIA) 400 MG/5ML suspension 30 mL, 30 mL, Oral, Daily PRN  acetaminophen (TYLENOL) tablet 650 mg, 650 mg, Oral, Q4H PRN      Impression/Plan:   Impaired ADLs, gait, and mobility due to:      1. Cauda Equina Syndrome: treated with laminectomy decompression of spinal deformity at L2-S1 by Dr. Bhavik Liu on 12/30/21. PT/OT  for gait, mobility, strengthening, endurance, ADLs, and self care. On Norco prn pain, baclofen TID for spasms. Will be post-op day 14 on 1/13/22 - Perfect served incision photo to Dr. Bhavik Liu - OK to remove sutures 1/13. 2. Chronic pain: on Norco and methadone. Prescribed by Dr. Lionel Garcia as outpatient. 3. Pseudomonas UTI: on Ciprofloxacin until 1/13/21. Urine retention resolved at [facility], remains asymptomatic. 4. HTN: on Diltiazem  5. GERD: on pantoprazole  6. Hypothyroidism: on levothyroxine  7. Blood Loss Anemia: Hb low but stable. Monitoring. Ferrous sulfate resumed 1/11 after constipation resolved. 8. Anxiety: on buspirone  9. Bowel Management/constipation: Miralax daily, Senokot prn, Dulcolax prn, Milk of Magnesia prn  10. Family medicine for medical management  11. DVT prophylaxis:  On Lovenox. TEDs during the day, EPC cuffs overnight    Electronically signed by Vessie Mortimer, MD on 1/12/2022 at 10:09 AM      This note is created with the assistance of a speech recognition program.  While intending to generate a document that actually reflects the content of the visit, the document can still have some errors including those of syntax and sound a like substitutions which may escape proof reading. In such instances, actual meaning can be extrapolated by contextual diversion.

## 2022-01-13 PROCEDURE — 6370000000 HC RX 637 (ALT 250 FOR IP): Performed by: NURSE PRACTITIONER

## 2022-01-13 PROCEDURE — 97110 THERAPEUTIC EXERCISES: CPT

## 2022-01-13 PROCEDURE — 6370000000 HC RX 637 (ALT 250 FOR IP): Performed by: FAMILY MEDICINE

## 2022-01-13 PROCEDURE — 97116 GAIT TRAINING THERAPY: CPT

## 2022-01-13 PROCEDURE — 97530 THERAPEUTIC ACTIVITIES: CPT

## 2022-01-13 PROCEDURE — 6360000002 HC RX W HCPCS: Performed by: NURSE PRACTITIONER

## 2022-01-13 PROCEDURE — 97535 SELF CARE MNGMENT TRAINING: CPT

## 2022-01-13 PROCEDURE — 6370000000 HC RX 637 (ALT 250 FOR IP): Performed by: PHYSICAL MEDICINE & REHABILITATION

## 2022-01-13 PROCEDURE — 1180000000 HC REHAB R&B

## 2022-01-13 PROCEDURE — 99232 SBSQ HOSP IP/OBS MODERATE 35: CPT | Performed by: PHYSICAL MEDICINE & REHABILITATION

## 2022-01-13 RX ORDER — FERROUS SULFATE 325(65) MG
325 TABLET ORAL 2 TIMES DAILY WITH MEALS
Qty: 30 TABLET | Refills: 3 | COMMUNITY
Start: 2022-01-13 | End: 2022-10-17

## 2022-01-13 RX ORDER — DOCUSATE SODIUM 100 MG/1
200 CAPSULE, LIQUID FILLED ORAL 2 TIMES DAILY
Status: ON HOLD | COMMUNITY
Start: 2022-01-13 | End: 2022-04-28 | Stop reason: HOSPADM

## 2022-01-13 RX ORDER — SENNA PLUS 8.6 MG/1
2 TABLET ORAL 2 TIMES DAILY
Qty: 120 TABLET | Refills: 0 | COMMUNITY
Start: 2022-01-13 | End: 2022-02-12

## 2022-01-13 RX ADMIN — SENNOSIDES 17.2 MG: 8.6 TABLET, COATED ORAL at 20:50

## 2022-01-13 RX ADMIN — DOCUSATE SODIUM 200 MG: 100 CAPSULE, LIQUID FILLED ORAL at 20:50

## 2022-01-13 RX ADMIN — BACLOFEN 10 MG: 10 TABLET ORAL at 14:41

## 2022-01-13 RX ADMIN — HYDROCODONE BITARTRATE AND ACETAMINOPHEN 1 TABLET: 7.5; 325 TABLET ORAL at 17:53

## 2022-01-13 RX ADMIN — DILTIAZEM HYDROCHLORIDE 180 MG: 90 CAPSULE, EXTENDED RELEASE ORAL at 09:04

## 2022-01-13 RX ADMIN — PANTOPRAZOLE SODIUM 40 MG: 40 TABLET, DELAYED RELEASE ORAL at 05:52

## 2022-01-13 RX ADMIN — FERROUS SULFATE TAB 325 MG (65 MG ELEMENTAL FE) 325 MG: 325 (65 FE) TAB at 07:58

## 2022-01-13 RX ADMIN — BACLOFEN 10 MG: 10 TABLET ORAL at 07:58

## 2022-01-13 RX ADMIN — CIPROFLOXACIN 500 MG: 500 TABLET, FILM COATED ORAL at 09:04

## 2022-01-13 RX ADMIN — FERROUS SULFATE TAB 325 MG (65 MG ELEMENTAL FE) 325 MG: 325 (65 FE) TAB at 16:54

## 2022-01-13 RX ADMIN — SENNOSIDES 17.2 MG: 8.6 TABLET, COATED ORAL at 07:58

## 2022-01-13 RX ADMIN — LEVOTHYROXINE SODIUM 88 MCG: 0.09 TABLET ORAL at 05:52

## 2022-01-13 RX ADMIN — METHADONE HYDROCHLORIDE 10 MG: 10 TABLET ORAL at 20:50

## 2022-01-13 RX ADMIN — DOCUSATE SODIUM 200 MG: 100 CAPSULE, LIQUID FILLED ORAL at 07:58

## 2022-01-13 RX ADMIN — METHADONE HYDROCHLORIDE 10 MG: 10 TABLET ORAL at 09:06

## 2022-01-13 RX ADMIN — BACLOFEN 10 MG: 10 TABLET ORAL at 20:50

## 2022-01-13 RX ADMIN — ENOXAPARIN SODIUM 40 MG: 100 INJECTION SUBCUTANEOUS at 07:58

## 2022-01-13 RX ADMIN — BUSPIRONE HYDROCHLORIDE 30 MG: 15 TABLET ORAL at 09:04

## 2022-01-13 RX ADMIN — CIPROFLOXACIN 500 MG: 500 TABLET, FILM COATED ORAL at 20:50

## 2022-01-13 RX ADMIN — BUSPIRONE HYDROCHLORIDE 30 MG: 15 TABLET ORAL at 20:50

## 2022-01-13 ASSESSMENT — PAIN DESCRIPTION - PROGRESSION: CLINICAL_PROGRESSION: GRADUALLY IMPROVING

## 2022-01-13 ASSESSMENT — 9 HOLE PEG TEST
TESTTIME_SECONDS: 24
TESTTIME_SECONDS: 27
TEST_RESULT: FUNCTIONAL
TEST_RESULT: FUNCTIONAL

## 2022-01-13 ASSESSMENT — PAIN SCALES - GENERAL
PAINLEVEL_OUTOF10: 2
PAINLEVEL_OUTOF10: 7
PAINLEVEL_OUTOF10: 2
PAINLEVEL_OUTOF10: 5
PAINLEVEL_OUTOF10: 5

## 2022-01-13 ASSESSMENT — PAIN DESCRIPTION - LOCATION
LOCATION: GENERALIZED
LOCATION: GENERALIZED

## 2022-01-13 ASSESSMENT — PAIN - FUNCTIONAL ASSESSMENT: PAIN_FUNCTIONAL_ASSESSMENT: PREVENTS OR INTERFERES SOME ACTIVE ACTIVITIES AND ADLS

## 2022-01-13 ASSESSMENT — PAIN DESCRIPTION - FREQUENCY: FREQUENCY: INTERMITTENT

## 2022-01-13 ASSESSMENT — PAIN DESCRIPTION - ONSET: ONSET: GRADUAL

## 2022-01-13 ASSESSMENT — PAIN SCALES - WONG BAKER: WONGBAKER_NUMERICALRESPONSE: 0

## 2022-01-13 ASSESSMENT — PAIN DESCRIPTION - PAIN TYPE
TYPE: CHRONIC PAIN
TYPE: ACUTE PAIN;CHRONIC PAIN

## 2022-01-13 NOTE — PROGRESS NOTES
Physical Therapy  [address]  Acute Rehabilitation Physical Therapy Progress Note    Date: 22  Patient Name: Catracho Eddy       Room: 0855/1875-08  MRN: [id number]   Account: [de-identified]   : 1960  (62 y.o.) Gender: female        Diagnosis: Lumbar stenosis with neurogenic claudication, L2-S1 laminectomy on 21 for cauda equina syndrome (Dr. Lori Gutierrez), Cervical stenosis with myelopathy, decreased BLE strength , L weaker than R,  Past Medical History:  has a past medical history of Anxiety, Arthritis, At maximum risk for fall, Cancer ([address] Cauda equina syndrome ([facility] [address] Cervical stenosis of spine, GERD (gastroesophageal reflux disease), History of stomach ulcers, Hypertension, Hypothyroidism, Lumbar neuritis, Post laminectomy syndrome, Spinal deformity, and Thyroid disease. Past Surgical History:   has a past surgical history that includes back surgery; hernia repair (Bilateral); Breast surgery (Right); cervical laminectomy (2014); Mastectomy, radical; Hysterectomy, total abdominal; and Lumbar spine surgery (N/A, 2021). Additional Pertinent Hx:   Patient presents with decline in her mobility, LE weakness over the course of the last few months where she can barely even ambulate with the walker. Pt admitted to [facility] and underwent  lumbar laminectomy L2-S1 for cauda equina decompression on 22. Pt presents with B LE weakness, L LE > R LE. Pt admitted to rehab unit on 22. Overall Orientation Status: Within Functional Limits  Restrictions/Precautions  Restrictions/Precautions: General Precautions;Surgical Protocols; Fall Risk;Up as Tolerated  Required Braces or Orthoses?: No  Position Activity Restriction  Spinal Precautions: lumbar spine surgery  Other position/activity restrictions: Activity as tolerated. per MD discharge papers pt OK to shower 3-4 days post surgery    Subjective: patient reporting feeling good today.  Continues to focus on LLE strengthening and improving coordination and placement of LLE with gait       Vital Signs  Patient Currently in Pain: Courtney  Villavicencio-Calles Pain Rating: No hurt     Oxygen Therapy  O2 Device: None (Room air)  Patient Observation  Observations: motivated to return home       Bed Mobility:   Bed Mobility  Bridging: Modified independent   Rolling: Modified independent  Supine to Sit: Modified independent  Sit to Supine: Modified independent  Scooting: Modified independent  Bed mobility  Bridging: Modified independent   Scooting: Modified independent    Transfers:  Sit to Stand: Stand by assistance  Stand to sit: Stand by assistance  Bed to Chair: Stand by assistance     Squat Pivot Transfers: Contact guard assistance (w/c<>mat table no AD)        Ambulation 1  Surface: level tile  Device: Rolling Walker  Assistance: Supervision  Quality of Gait: patient attempts to hip flex and heel contact to toe off with deliberate attempt and concentration  Gait Deviations: Deviated path;Decreased step length;Decreased step height  Distance: 2MWT 200 feet  in AM;  200 ft in PM; short distances withing gym. Stairs/Curb  Stairs?: Yes  Stairs  # Steps : 12  Stairs Height: 8\"  Rails: Bilateral;Left ascending (8 steps BHR 4 steps Left HR)  Curbs: 8\"  Device: Rolling walker  Assistance: Contact guard assistance  Comment: step to pattern; patient states does not preform flight of steps at home. Wheelchair Activities  Propulsion: Yes  Propulsion 1  Propulsion: Manual  Level: Level Tile  Method: RUE;LUE  Level of Assistance: Stand by assistance;Contact guard assistance  Description/ Details: Edu provided on w/c management (brakes, leg rest, and proper hand placement on rim of wheel). Pt demos good carryover, however would benefit from additional practice. Pt demos min difficulty maintaining a straight path. Pt is able to complete a 90 degree turn.    Distance: 50'x1           BALANCE Posture: Fair  Sitting - Static: Good  Sitting - Dynamic: Fair;+  Standing - Static: Fair (rolling walker)  Standing - Dynamic: Fair;- (rolling walker)  Comments: standing balance assessed while using a RW    EXERCISES    Other exercises?: Yes  Other exercises 1: seated unsupported trunk 6 minutes; seated supported trunk 17 minutes; standing one UE support 2 minutes EOM BUE ROM/Trunk ROM/stabilization VR system on activities tolerated  25 minutes  Other exercises 2: Seated BLE circulation 20-30 reps  Other exercises 3: RLE step down working on eccentric L quad control in stance x 10; Step up LLE  Other exercises 4: sit<>stand from EOM 3 different ways: x 5ea  Other exercises 5: push ups seated 5 x2 sets  Other exercises 6: Bridges x 10- CGA to stabilize LLE  Other Activities  Comment: rest breaks PRN. Activity Tolerance: Patient Tolerated treatment well  PT Equipment Recommendations  Equipment Needed: No         Current Treatment Recommendations: Strengthening,ROM,Stair training,Balance Training,Gait Training,Patient/Caregiver Education & Training,Equipment Evaluation, Education, & procurement,Neuromuscular Re-education,Functional Mobility Training,Endurance Training,Transfer Training,Safety Education & Training,Home Exercise Program    Conditions Requiring Skilled Therapeutic Intervention  Treatment Diagnosis: Impaired fucntion  Prognosis: Good  REQUIRES PT FOLLOW UP: Yes  Discharge Recommendations: Patient would benefit from continued therapy after discharge;Home with assist PRN    Goals  Short term goals  Time Frame for Short term goals: 1 week  Short term goal 1: Pt will ambulate 200 feet with a RW and SBA to increase functional independence. Short term goal 2: Pt able to perform bed mobility at SBA  Short term goal 3: Pt will demonstrate good- standing balance with rolling walker to decrease fall risk. Short term goal 4: Pt will perform sit<>stand and pivot transfer with rolling walker SBA to increase functional independence.   Short term goal 5: Pt will negotiate 5 stair with 2 handrail and CGA to allow the pt to enter prior living arrangements. Long term goals  Time Frame for Long term goals : By DC  Long term goal 1: Pt able to perform bed mobility mod-I  Long term goal 2: Pt able to transfer at mod-I  Long term goal 3: Pt able to ambulate with rolling walker distance of 150 ft, mod-I, level surfaces  Long term goal 4:  Pt able to ambulate on incline surface at SBA with rolling walker. Long term goal 5: Pt able to get up and down curb steps with UE support, CGA  Long term goal 6: Improve 2MWT distance to 100 ft to improve gait speed and overall fucntion.   Long term goal 7: Pt able to go up and 12 steps with 2 UE support at SBA to improve B LE strength.       01/13/22 0805 01/13/22 1300   PT Individual Minutes   Time In 0805 1300   Time Out [phone]   Minutes 45 45       Electronically signed by Chemo Kaufman PTA on 1/13/22 at 6:00 PM EST

## 2022-01-13 NOTE — PLAN OF CARE
Problem: Falls - Risk of:  Goal: Will remain free from falls  Description: Will remain free from falls  Outcome: Met This Shift   All standard fall precautions in place, patient belongings, bedside table, and call light within patient reach. Problem: Falls - Risk of:  Goal: Absence of physical injury  Description: Absence of physical injury  Outcome: Met This Shift     Problem: Skin Integrity:  Goal: Will show no infection signs and symptoms  Description: Will show no infection signs and symptoms  Outcome: Met This Shift     Problem: Skin Integrity:  Goal: Absence of new skin breakdown  Description: Absence of new skin breakdown  Outcome: Met This Shift   No new areas of concern observed or reported. Problem: Pain:  Goal: Pain level will decrease  Description: Pain level will decrease  Outcome: Ongoing   Patient medicated as needed and patient satisfied with pain management. Problem: Pain:  Goal: Control of acute pain  Description: Control of acute pain  Outcome: Ongoing     Problem: Pain:  Goal: Control of chronic pain  Description: Control of chronic pain  Outcome: Ongoing     Problem: Musculor/Skeletal Functional Status  Goal: Highest potential functional level  Outcome: Ongoing     Problem: Musculor/Skeletal Functional Status  Goal: Absence of falls  Outcome: Ongoing  Note: All standard fall precautions in place, patient belongings, bedside table, and call light within patient reach.       Problem: Musculor/Skeletal Functional Status  Goal: Highest potential functional level  Outcome: Ongoing

## 2022-01-13 NOTE — PROGRESS NOTES
[address] [facility]    ACUTE REHABILITATION OCCUPATIONAL THERAPY  DAILY NOTE    Date: 22  Patient Name: Zoë Martinez      Room: 0677/5137-60    MRN: [id number]   : 1960  (62 y.o.)  Gender: female      Diagnosis: Lumbar stenosis with neurogenic claudication, L2-S1 laminectomy on 21 for cauda equina syndrome (Dr. Louisa Chavarria), Cervical stenosis with myelopathy, decreased BLE strength , L weaker than R,  Additional Pertinent Hx: worsening function over past 2 months - has needed more assist with advanced adls. wears pullups at home due to urgency issues. needs cervical spine surgery when able post this surgery. Left facial symptoms: could be early bells palsy    Restrictions  Restrictions/Precautions: General Precautions,Surgical Protocols,Fall Risk,Up as Tolerated  Spinal Precautions: lumbar spine surgery  Other position/activity restrictions: Activity as tolerated. per MD discharge papers pt OK to shower 3-4 days post surgery  Required Braces or Orthoses?: No    Subjective  Subjective: \"I feel alot better. Audie Magaña I can see my progress\"  Comments: pt verbalized her happiness with progression she has made in therapies  Patient Currently in Pain: Yes  Pain Level: 5  Pain Location: Generalized (neck to back to legs)  Restrictions/Precautions: General Precautions;Surgical Protocols; Fall Risk;Up as Tolerated  Overall Orientation Status: Within Functional Limits  Patient Observation  Observations: motivated to return home  Pain Assessment  Pain Assessment: 0-10  Pain Level: 5  Pain Type: Chronic pain  Pain Location: Generalized (neck to back to legs)  Pain Orientation: Mid,Lower  Pain Descriptors: Radiating (per pt pain from neck to back to legs and feet)    Objective  Cognition  Overall Cognitive Status: WFL  Perception  Overall Perceptual Status: WFL  Balance  Sitting Balance: Modified independent   Standing Balance: Modified independent  (UE support as needed)  Transfers  Stand Step Transfers: Supervision (with R/W)  Sit to stand: Stand by assistance;Supervision  Stand to sit: Supervision;Stand by assistance  Transfer Comments: good hand placements noted  Standing Balance  Time: AM: 2-3 min, 1-2 min x4; PM: 1-2 min x2, 2-3 min  Activity: AM: functional mobility/transfers in room/bathroom, self care tasks, item retrieval; PM: functional mobility with R/W, dynamic standing to retrieve items from floor level, using reacher for retrieval, SUP with no LOB noted-good safety awareness, mock tub transfer  Comment: no LOB noted  Functional Mobility  Functional - Mobility Device: Rolling Walker  Activity: Retrieve items;Transport items; To/from bathroom; Other  Assist Level: Supervision  Functional Mobility Comments: slight ataxic gait noted/unsteady but no gross LOB noted  Toilet Transfers  Toilet - Technique: Ambulating (with R/W)  Equipment Used: Grab bars  Toilet Transfer: Supervision  Toilet Transfers Comments: with RW, good hand placement this date  Shower Transfers  Shower - Transfer From: Walker (rolling)  Shower - Transfer Type: To and From  Shower - Transfer To: Transfer tub bench  Shower - Technique: Ambulating  Shower Transfers: Stand by assistance  Shower Transfers Comments: SBA, VCs for tech and increased stride height crossing threshold, good carryover noted, no LOB  Tub Transfers  Tub - Transfer From: Rolling walker  Tub - Transfer Type: To and From  Tub - Transfer To:  Transfer tub bench  Tub - Technique: Ambulating  Tub Transfers: Supervision;Verbal cues  Tub Transfers Comments: pt completed mock tub transfer with transfer bench, educated on suction GB's for increased safety in standing in shower, modified leg  provided (gait belt) to assist LLE over tub edge, good carryover noted , task to increase safety and indep with shower transfers, pt verbalized her and spouse discussed removing glass doors and installing curtain/mic     Type of ROM/Therapeutic Exercise  Type of ROM/Therapeutic Exercise: Free weights (1# LUE, 2# RUE, 20 reps)  Comment: pt engaged in BUE ex's to support mobility/transfers and overall endurance, rest breaks req but demo'd good tolerance, all within precautions  Exercises  Scapular Protraction: x  Scapular Retraction: x  Shoulder Flexion: x  Shoulder Extension: x  Horizontal ABduction: x  Horizontal ADduction: x  Elbow Flexion: x  Elbow Extension: x                         Fine Motor Skills  Left 9-Hole Peg Test: Functional  Left 9 Hole Peg Test Time (secs): 27  Right 9-Hole Peg Test: Functional  Right 9 Hole Peg Test Time (secs): 24  Other Assessment: norm: 19-28 sec, improvements noted for BUE's from evaluation                            ADL  Equipment Provided: Long-handled sponge  Feeding: Modified independent ;Dentures  Grooming: Modified independent  (seated-pt reports she will sit at home)  UE Bathing: Modified independent   LE Bathing: Modified independent  (using GB's in standing)  UE Dressing: Modified independent   LE Dressing: Modified independent  (A TEDs only, using GB's standing)  Toileting: Modified independent  (using GB's)          Assessment  Performance deficits / Impairments: Decreased functional mobility ; Decreased ADL status; Decreased endurance;Decreased high-level IADLs;Decreased balance;Decreased coordination;Decreased sensation;Decreased posture;Decreased ROM; Decreased strength;Decreased fine motor control  Prognosis: Good  Discharge Recommendations: Patient would benefit from continued therapy after discharge  Activity Tolerance: Patient Tolerated treatment well  Safety Devices in place: Yes  Type of devices: Left in chair;Call light within reach; All fall risk precautions in place; Patient at risk for falls  Equipment Recommendations  Equipment Needed: Yes  Hand Held Shower: x  Transfer Tub Bench: x  Grab bars: x  Reacher: x  Sock Aid: X       Patient Education: tub transfer/DME, transfer safety, DC planning, strengthening, modified leg  to ease with transfers as needed   Patient Goals   Patient goals : walk safely to be home alone while spouse at work  Learner:patient  Method: demonstration and explanation       Outcome: demonstrated understanding        Plan  Plan  Times per week: 5-7  Times per day: Twice a day  Current Treatment Recommendations: Strengthening,Patient/Caregiver Education & Training,Home Management Training,Equipment Evaluation, Education, & procurement,Balance Training,Functional Mobility Training,Endurance Training,Safety Education & Training,Self-Care / ADL  Patient Goals   Patient goals : walk safely to be home alone while spouse at work  Short term goals  Time Frame for Short term goals: 1 week  Short term goal 1: min assist LE self care  Short term goal 2: CGA amb to obtain set up for adls with good walker technique  Short term goal 3: joseph 8-9 min stand , ambulate for adls with RW and CGA  Short term goal 4: joseph 10 reps x 2 B shld ex with 1 lb weight on L and 2 lb weight on R  and 20 reps x 2 elbow ex with 2 lb wt on L and 3 lb on R for work joseph and UE strengthening. Short term goal 5: joseph repetitive  ex LUE with mod resistance for hand strengthening. Long term goals  Time Frame for Long term goals : by discharge  Long term goal 1: mod indep self care  Long term goal 2: mod indep amb to obtain set up for adls with good walker safety  Long term goal 3: joseph 11-13 min stand mod indep during adls  Long term goal 4: mod assist with transfer to tub to use tub seat vs remove shower doors for extended tub bench transfer with min assist.  Long term goal 5: mod indep with simple advanced adls with good walker safety and good balance. Long term goals 6: indep with BUE HEP for strengthening and coordination. Long term goal 7: increase L hand  strength to 40 pounds.         01/13/22 1006 01/13/22 1515   OT Individual Minutes   Time In R Jose G Acevedo 73   Time Out 1114 1433   Minutes 68 17   OT Concurrent Minutes   Time In  --  5090   Time Out  -- [address]  --  10     Electronically signed by GOOD Tsai on 1/13/22 at 3:18 PM EST

## 2022-01-13 NOTE — PROGRESS NOTES
34 Sutures removed from surgical site without complications. Patient tolerated without complications. Incision remains open to air.

## 2022-01-13 NOTE — PLAN OF CARE
Problem: Pain:  Goal: Pain level will decrease  Description: Pain level will decrease  1/12/2022 2126 by Rich Killian LPN  Outcome: Ongoing     Problem: Falls - Risk of:  Goal: Will remain free from falls  Description: Will remain free from falls  1/12/2022 2126 by Rich Killian LPN  Outcome: Ongoing     Problem: Skin Integrity:  Goal: Absence of new skin breakdown  Description: Absence of new skin breakdown  1/12/2022 2126 by Rich Killian LPN  Outcome: Ongoing     Problem: Musculor/Skeletal Functional Status  Goal: Highest potential functional level  1/12/2022 2126 by Rich Killian LPN  Outcome: Ongoing     Problem: Musculor/Skeletal Functional Status  Goal: Absence of falls  1/12/2022 2126 by Rich Killian LPN  Outcome: Ongoing     Problem: Nutrition  Goal: Optimal nutrition therapy  1/12/2022 2126 by Rich Killian LPN  Outcome: Ongoing

## 2022-01-13 NOTE — PROGRESS NOTES
Physical Medicine & Rehabilitation  Progress Note      Subjective:      62year old female with cauda equina syndrome. Patient is doing well again today. She reports mild aching back pain. No new issues with sleep, appetite, bowel, or bladder. ROS:  Denies fevers, chills, sweats. No chest pain, palpitations, lightheadedness. Denies coughing, wheezing or shortness of breath. Denies abdominal pain, nausea, diarrhea or constipation. No new areas of joint pain. Denies new areas of numbness or weakness. Denies new anxiety or depression issues. No new skin problems. Rehabilitation:   Progressing in therapies. PT:  Restrictions/Precautions: General Precautions,Surgical Protocols,Fall Risk,Up as Tolerated  Spinal Precautions: lumbar spine surgery  Other position/activity restrictions: Activity as tolerated. per MD discharge papers pt OK to shower 3-4 days post surgery   Transfers  Sit to Stand: Stand by assistance  Stand to sit: Stand by assistance  Bed to Chair: Stand by assistance  Stand Pivot Transfers: Contact guard assistance,Minimal Assistance  Squat Pivot Transfers: Contact guard assistance (w/c<>mat table no AD)  Comment: attempt with floor tranfers required max A of 1 and patient became panicy. patient recovered once seated safely to chair. Able to demonstrate transfer with modification with stool to chair vs floor to chair. Ambulation 1  Surface: level tile  Device: Rolling Walker  Assistance: Supervision  Quality of Gait: patient attempts to hip flex and heel contact to toe off with deliberate attempt and concentration  Gait Deviations: Deviated path,Decreased step length,Decreased step height  Distance: 2MWT 190 feet; 210 feet tolerated before needed rest break  Comments: Mirror therapy helped patient to improve technique with LLE swing phase.  Patient pleased to see progress    Transfers  Sit to Stand: Stand by assistance  Stand to sit: Stand by assistance  Bed to Chair: Stand by stride height for over shower threshold. Balance  Sitting Balance: Modified independent   Standing Balance: Supervision (UE support as needed)   Standing Balance  Time: AM: 2-3 min, 1-2 min x4  Activity: AM: functional mobility/transfers in room/bathroom, self care tasks, item retrieval  Comment: no LOB noted  Functional Mobility  Functional - Mobility Device: Rolling Walker  Activity: Retrieve items,Transport items,To/from bathroom,Other  Assist Level: Supervision  Functional Mobility Comments: slight ataxic gait noted/unsteady but no gross LOB noted     Bed mobility  Bridging: Modified independent   Rolling to Left: Supervision  Rolling to Right: Supervision  Supine to Sit: Supervision  Sit to Supine: Supervision  Scooting: Modified independent  Comment: patient reports does not plan to sleep in bed prefers her recliner chair for comfort  Transfers  Stand Step Transfers: Stand by assistance  Stand Pivot Transfers: Minimal assistance  Sit to stand: Contact guard assistance,Stand by assistance  Stand to sit: Contact guard assistance,Stand by assistance  Transfer Comments:  AM: W/RW; good hand placement noted;PM: Pt voice concern this PM about feeling nervous about transfering from different types of chairs once at private residence. Sit<> stand trials with various types of furniture (pieces with arms and without arms) facilited  this PM to increase overall safety and independence during functional transfers. Pt SBA sit<>stand with arms (3/3) with increased ease. Sit<>stand trial without arms for support ( 3/5). Trial 1: pt req MIN A Sit>stand/ SBA stand>sit; Trial 2: CGA Sit>stand/ SBA Stand>sit; Trial 3 CGA sit>stand/SBA stand>sit. pt demonstrated 2 posterior LOB unable to complete transfer. VC provided for hand and foot placement to increase safety during functional transfers. G tolerance noted throughout activity.    Toilet Transfers  Toilet - Technique: Ambulating  Equipment Used: Grab bars  Toilet Transfer: Stand by assistance  Toilet Transfers Comments: with RW, good hand placement this date     Shower Transfers  Shower - Transfer From: Walker (Rolling)  Shower - Transfer Type: To and From  Shower - Transfer To: Transfer tub bench  Shower - Technique: Ambulating  Shower Transfers: Stand by assistance  Shower Transfers Comments: close SBA, VCs for tech and increased stride height crossing threshold, good carryover noted, no LOB       SPEECH:      Objective:  /78   Pulse 90   Temp 98.3 °F (36.8 °C) (Oral)   Resp 20   Ht 5' 1\" (1.549 m)   Wt 142 lb 6.4 oz (64.6 kg)   SpO2 100%   BMI 26.91 kg/m²       GEN: well developed, well nourished, NAD  HEENT: NCAT, PERRL, EOMI, mucous membranes pink and moist  CV: RRR, no murmurs, rubs or gallops  PULM: CTAB, no rales or rhonchi. Respirations WNL and unlabored  ABD: soft, NT, ND, BS+ and equal  NEURO: A&O x3. Sensation intact to light touch. MSK: Functional ROM all extremities . Strength 5/5 key muscles RLE, 4+/5 key muscles LLE, 5/5 key muscles BUEs. EXTREMITIES: No calf tenderness to palpation bilaterally. No edema BLEs  SKIN: warm dry and intact with good turgor. Well approximated midline lumbosacral incision with sutures intact. PSYCH: appropriately interactive. Affect WNL. Diagnostics:     CBC:   Recent Labs     01/12/22  0643   WBC 3.9   RBC 2.89*   HGB 9.4*   HCT 27.2*   MCV 94.4   RDW 13.3        BMP:   Recent Labs     01/12/22  0643      K 4.2      CO2 28   BUN 9   CREATININE 0.82   GLUCOSE 118*     BNP: No results for input(s): BNP in the last 72 hours. PT/INR: No results for input(s): PROTIME, INR in the last 72 hours. APTT: No results for input(s): APTT in the last 72 hours. CARDIAC ENZYMES: No results for input(s): CKMB, CKMBINDEX, TROPONINT in the last 72 hours.     Invalid input(s): CKTOTAL;3 troponins   FASTING LIPID PANEL:  Lab Results   Component Value Date    CHOL 198 07/23/2020     (A) 07/23/2020 TRIG 96 07/23/2020     LIVER PROFILE: No results for input(s): AST, ALT, ALB, BILIDIR, BILITOT, ALKPHOS in the last 72 hours. Current Medications:   Current Facility-Administered Medications: polyethylene glycol (GLYCOLAX) packet 17 g, 17 g, Oral, Daily PRN  bisacodyl (DULCOLAX) suppository 10 mg, 10 mg, Rectal, Daily PRN  docusate sodium (COLACE) capsule 200 mg, 200 mg, Oral, BID  senna (SENOKOT) tablet 17.2 mg, 2 tablet, Oral, BID  HYDROcodone-acetaminophen (NORCO) 7.5-325 MG per tablet 1 tablet, 1 tablet, Oral, Q8H PRN  bisacodyl (DULCOLAX) suppository 10 mg, 10 mg, Rectal, Daily  enoxaparin (LOVENOX) injection 40 mg, 40 mg, SubCUTAneous, Daily  ondansetron (ZOFRAN-ODT) disintegrating tablet 4 mg, 4 mg, Oral, Q8H PRN **OR** [DISCONTINUED] ondansetron (ZOFRAN) injection 4 mg, 4 mg, IntraVENous, Q6H PRN  baclofen (LIORESAL) tablet 10 mg, 10 mg, Oral, TID  busPIRone (BUSPAR) tablet 30 mg, 30 mg, Oral, BID  ciprofloxacin (CIPRO) tablet 500 mg, 500 mg, Oral, 2 times per day  dilTIAZem (CARDIZEM 12 HR) extended release capsule 180 mg, 180 mg, Oral, Daily  ferrous sulfate (IRON 325) tablet 325 mg, 325 mg, Oral, BID WC  levothyroxine (SYNTHROID) tablet 88 mcg, 88 mcg, Oral, Daily  methadone (DOLOPHINE) tablet 10 mg, 10 mg, Oral, BID  pantoprazole (PROTONIX) tablet 40 mg, 40 mg, Oral, Daily  magnesium hydroxide (MILK OF MAGNESIA) 400 MG/5ML suspension 30 mL, 30 mL, Oral, Daily PRN  acetaminophen (TYLENOL) tablet 650 mg, 650 mg, Oral, Q4H PRN      Impression/Plan:   Impaired ADLs, gait, and mobility due to:      1. Cauda Equina Syndrome: treated with laminectomy decompression of spinal deformity at L2-S1 by Dr. Blas Beckham on 12/30/21. PT/OT  for gait, mobility, strengthening, endurance, ADLs, and self care. On Norco prn pain, baclofen TID for spasms. Sutures removed 1/13. 2. Chronic pain: on Norco and methadone. Prescribed by Dr. Ariana Sesay as outpatient. 3. Pseudomonas UTI: on Ciprofloxacin until 1/13/21. Urine retention resolved at [place] [address], remains asymptomatic. 4. HTN: on Diltiazem  5. GERD: on pantoprazole  6. Hypothyroidism: on levothyroxine  7. Blood Loss Anemia: Hb low but stable. Monitoring. Ferrous sulfate resumed 1/11 after constipation resolved. 8. Anxiety: on buspirone  9. Bowel Management/constipation: Miralax daily, Senokot prn, Dulcolax prn, Milk of Magnesia prn  10. Family medicine for medical management  11. DVT prophylaxis:  On Lovenox. TEDs during the day, EPC cuffs overnight  12. Achieving therapy goals. Anticipate discharge home tomorrow with outpatient therapy. Follow up PCP 1-2 weeks, Dr. Yarbrough Speaks 1/17, PM&R 4 weeks. Electronically signed by Julieta Simmons MD on 1/13/2022 at 10:54 AM      This note is created with the assistance of a speech recognition program.  While intending to generate a document that actually reflects the content of the visit, the document can still have some errors including those of syntax and sound a like substitutions which may escape proof reading. In such instances, actual meaning can be extrapolated by contextual diversion.

## 2022-01-13 NOTE — CARE COORDINATION
Writer ordersd tub transfer bench from [employer] [facility] [employer]. Will be delivered to the hosp. [facility] called medicare doesn not cover transfer benches. Writer informed the pt.

## 2022-01-14 VITALS
DIASTOLIC BLOOD PRESSURE: 87 MMHG | SYSTOLIC BLOOD PRESSURE: 127 MMHG | WEIGHT: 142.4 LBS | HEART RATE: 97 BPM | OXYGEN SATURATION: 96 % | RESPIRATION RATE: 16 BRPM | BODY MASS INDEX: 26.88 KG/M2 | HEIGHT: 61 IN | TEMPERATURE: 98.1 F

## 2022-01-14 PROCEDURE — 97530 THERAPEUTIC ACTIVITIES: CPT

## 2022-01-14 PROCEDURE — 97116 GAIT TRAINING THERAPY: CPT

## 2022-01-14 PROCEDURE — 6370000000 HC RX 637 (ALT 250 FOR IP): Performed by: NURSE PRACTITIONER

## 2022-01-14 PROCEDURE — 97110 THERAPEUTIC EXERCISES: CPT

## 2022-01-14 PROCEDURE — 6360000002 HC RX W HCPCS: Performed by: NURSE PRACTITIONER

## 2022-01-14 PROCEDURE — 97535 SELF CARE MNGMENT TRAINING: CPT

## 2022-01-14 PROCEDURE — 99239 HOSP IP/OBS DSCHRG MGMT >30: CPT | Performed by: PHYSICAL MEDICINE & REHABILITATION

## 2022-01-14 RX ADMIN — SENNOSIDES 17.2 MG: 8.6 TABLET, COATED ORAL at 07:51

## 2022-01-14 RX ADMIN — ENOXAPARIN SODIUM 40 MG: 100 INJECTION SUBCUTANEOUS at 07:53

## 2022-01-14 RX ADMIN — METHADONE HYDROCHLORIDE 10 MG: 10 TABLET ORAL at 07:52

## 2022-01-14 RX ADMIN — BUSPIRONE HYDROCHLORIDE 30 MG: 15 TABLET ORAL at 07:55

## 2022-01-14 RX ADMIN — PANTOPRAZOLE SODIUM 40 MG: 40 TABLET, DELAYED RELEASE ORAL at 05:53

## 2022-01-14 RX ADMIN — LEVOTHYROXINE SODIUM 88 MCG: 0.09 TABLET ORAL at 05:53

## 2022-01-14 RX ADMIN — BACLOFEN 10 MG: 10 TABLET ORAL at 07:52

## 2022-01-14 RX ADMIN — DILTIAZEM HYDROCHLORIDE 180 MG: 90 CAPSULE, EXTENDED RELEASE ORAL at 07:55

## 2022-01-14 RX ADMIN — FERROUS SULFATE TAB 325 MG (65 MG ELEMENTAL FE) 325 MG: 325 (65 FE) TAB at 07:52

## 2022-01-14 RX ADMIN — DOCUSATE SODIUM 200 MG: 100 CAPSULE, LIQUID FILLED ORAL at 07:52

## 2022-01-14 ASSESSMENT — PAIN SCALES - GENERAL: PAINLEVEL_OUTOF10: 5

## 2022-01-14 NOTE — PROGRESS NOTES
Reviewed AVS with pt. Pt verbalized understanding. No questions or concerns at this time. All personal belongings were given to pt. Pt was taken to personal car via wheelchair.

## 2022-01-14 NOTE — PLAN OF CARE
Problem: Pain:  Goal: Pain level will decrease  Description: Pain level will decrease  1/14/2022 0425 by Tamra Gutierrez RN  Outcome: Ongoing  1/13/2022 1815 by Coby Bonilla RN  Outcome: Ongoing  Goal: Control of acute pain  Description: Control of acute pain  1/14/2022 0425 by Tamra Gutierrez RN  Outcome: Ongoing  1/13/2022 1815 by Coby Bonilla RN  Outcome: Ongoing  Goal: Control of chronic pain  Description: Control of chronic pain  1/14/2022 0425 by Tamra Gutierrez RN  Outcome: Ongoing  1/13/2022 1815 by Coby Bonilla RN  Outcome: Ongoing     Problem: Falls - Risk of:  Goal: Will remain free from falls  Description: Will remain free from falls  1/14/2022 0425 by Tamra Gutierrez RN  Outcome: Ongoing  1/13/2022 1815 by Coby Bonilla RN  Outcome: Met This Shift  Goal: Absence of physical injury  Description: Absence of physical injury  1/14/2022 0425 by Tamra Gutierrez RN  Outcome: Ongoing  1/13/2022 1815 by Coby Bonilla RN  Outcome: Met This Shift     Problem: Skin Integrity:  Goal: Will show no infection signs and symptoms  Description: Will show no infection signs and symptoms  1/14/2022 0425 by Tamra Gutierrez RN  Outcome: Ongoing  1/13/2022 1815 by Coby Bonilla RN  Outcome: Met This Shift  Goal: Absence of new skin breakdown  Description: Absence of new skin breakdown  1/14/2022 0425 by Tamra Gutierrez RN  Outcome: Ongoing  1/13/2022 1815 by Coby Bonilla RN  Outcome: Met This Shift     Problem: Musculor/Skeletal Functional Status  Goal: Highest potential functional level  1/14/2022 0425 by Tamra Gutierrez RN  Outcome: Ongoing  1/13/2022 1815 by Coby Bonilla RN  Outcome: Ongoing  Goal: Absence of falls  1/14/2022 0425 by Tamra Gutierrez RN  Outcome: Ongoing  1/13/2022 1815 by Coby Bonilla RN  Outcome: Ongoing  Note: All standard fall precautions in place, patient belongings, bedside table, and call light within patient reach.       Problem: Musculor/Skeletal Functional Status  Goal: Highest potential functional level  1/14/2022 0425 by Tamra Gutierrez RN  Outcome: Ongoing  1/13/2022 1815 by Lorenza Cronin RN  Outcome: Ongoing  Goal: Absence of falls  1/14/2022 0425 by Angie Schaefer RN  Outcome: Ongoing  1/13/2022 1815 by Lorenza Cronin RN  Outcome: Ongoing     Problem: Nutrition  Goal: Optimal nutrition therapy  1/14/2022 0425 by Angie Schaefer RN  Outcome: Ongoing  1/13/2022 1815 by Lorenza Cronin RN  Outcome: Ongoing

## 2022-01-14 NOTE — DISCHARGE INSTR - COC
Continuity of Care Form    Patient Name: Lata Gramajo   :  1960  MRN:  [id number]    Admit date:  2022  Discharge date:  ***    Code Status Order: Full Code   Advance Directives:      Admitting Physician:  Latesha Díaz MD  PCP: Makenna Flores MD    Discharging Nurse: [facility] Unit/Room#: 9671/7336-17  Discharging Unit Phone Number: ***    Emergency Contact:   Extended Emergency Contact Information  Primary Emergency Contact: [address] Phone: [phone]  Relation: Child  [facility]: [phone]  Relation: Spouse  Preferred language: English    Past Surgical History:  Past Surgical History:   Procedure Laterality Date    BACK SURGERY      x 2    BREAST SURGERY Right     mastectomy with reconstruction    CERVICAL LAMINECTOMY  2014    cervicle laminectomy c4-c6    HERNIA REPAIR Bilateral     inguinal    HYSTERECTOMY, TOTAL ABDOMINAL      LUMBAR SPINE SURGERY N/A 2021    LUMBAR LAMINECTOMY L2-S1 performed by Ivy Sparks DO at [address] [facility]         Immunization History:   Immunization History   Administered Date(s) Administered    COVID-19, Delaletha Lermamins, Primary or Immunocompromised, PF, 100mcg/0.5mL 2021, 2021, 12/15/2021    Influenza, MDCK Quadv, IM, PF (Flucelvax 2 yrs and older) 10/22/2021    Influenza, Quadv, IM, PF (6 mo and older Fluzone, Flulaval, Fluarix, and 3 yrs and older Afluria) 10/02/2018, 2019, 2020    Pneumococcal Polysaccharide (Oiuyvckhc22) 10/28/2020    Tdap (Boostrix, Adacel) 2019       Active Problems:  Patient Active Problem List   Diagnosis Code    Cervical stenosis of spine M48.02    Essential hypertension I10    Hypothyroidism E03.9    Lumbar radiculopathy, chronic M54.16    Lumbar neuritis M54.16    Post laminectomy syndrome M96.1    Hypokalemia E87.6    Pain of right hip joint M25.551    Post-menopausal osteoporosis M81.0    Chronic gastritis Those affected products were removed from shelf. She needs to try the meds Bid for 2 weeks. If not better we can appeal. I won't appeal now because the will deny it. She is more than welcome to purchase OTC omeprazole daily if she refuses. without bleeding K29.50    Elevated CEA R97.0    Esophageal dysphagia R13.19    Hypotension due to drugs I95.2    Ulcerative esophagitis K22.10    Weight loss, abnormal R63.4    Cervical myelopathy (HCC) G95.9    Lumbar stenosis with neurogenic claudication M48.062    Spinal deformity Q76.49    Anxiety about health F41.8    Cauda equina compression (HCC) G83.4    Anemia, normocytic normochromic D64.9    Iron deficiency E61.1    Pseudomonas urinary tract infection N39.0, B96.5    Hypocalcemia E83.51    Cauda equina syndrome (HCC) G83.4       Isolation/Infection:   Isolation            No Isolation          Patient Infection Status       None to display            Nurse Assessment:  Last Vital Signs: /87   Pulse 97   Temp 98.1 °F (36.7 °C) (Oral)   Resp 16   Ht 5' 1\" (1.549 m)   Wt 142 lb 6.4 oz (64.6 kg)   SpO2 96%   BMI 26.91 kg/m²     Last documented pain score (0-10 scale): Pain Level: 5  Last Weight:   Wt Readings from Last 1 Encounters:   22 142 lb 6.4 oz (64.6 kg)     Mental Status:  {IP PT MENTAL STATUS:}    IV Access:  { MONICA IV ACCESS:673703402}    Nursing Mobility/ADLs:  Walking   {[facility] DME IMSB:994813554}  Transfer  {[facility] DME WIFE:958806714}  Bathing  {[facility] DME VLLN:281546554}  Dressing  {[facility] DME LKCP:473790855}  Toileting  {[facility] DME ZJQM:780933083}  Feeding  {[facility] DME VCCL:138792655}  Med Admin  {[facility] DME JPVM:389334900}  Med Delivery   { MONICA MED Delivery:518102637}    Wound Care Documentation and Therapy:        Elimination:  Continence: Bowel: {YES / D}  Bladder: {YES / DK:84734}  Urinary Catheter: {Urinary Catheter:932683563}   Colostomy/Ileostomy/Ileal Conduit: {YES / IY:50476}       Date of Last BM: ***    Intake/Output Summary (Last 24 hours) at 2022 1030  Last data filed at 2022 1900  Gross per 24 hour   Intake 1320 ml   Output --   Net 1320 ml     I/O last 3 completed shifts:   In: 1560 [P.O.:1560]  Out: -     Safety Concerns:     [address] Concerns:688994365}    Impairments/Disabilities:      [address] Impairments/Disabilities:219623486}    Nutrition Therapy:  Current Nutrition Therapy:   [address] MONICA Diet List:764699946}    Routes of Feeding: {CHP DME Other Feedings:024508056}  Liquids: {Slp liquid thickness:43212}  Daily Fluid Restriction: {CHP DME Yes amt example:550243958}  Last Modified Barium Swallow with Video (Video Swallowing Test): {Done Not Done TRTC:360725644}    Treatments at the Time of Hospital Discharge:   Respiratory Treatments: ***  Oxygen Therapy:  {Therapy; copd oxygen:59735}  Ventilator:    {MH CC Vent JSVX:238714098}    Rehab Therapies: {THERAPEUTIC INTERVENTION:8464622458}  Weight Bearing Status/Restrictions: [address]  Weight Bearin}  Other Medical Equipment (for information only, NOT a DME order):  {EQUIPMENT:132238605}  Other Treatments: ***    Patient's personal belongings (please select all that are sent with patient):  {CHP DME Belongings:726059268}    RN SIGNATURE:  {Esignature:679523418}    CASE MANAGEMENT/SOCIAL WORK SECTION    Inpatient Status Date: 2022    Readmission Risk Assessment Score:  Readmission Risk              Risk of Unplanned Readmission:  15           Discharging to Facility/ [address] [facility], [place], operated by [facility]  [address] [facility], [address] [facility] [address]  [address]  Phone:  [phone]  Fax:  [phone]    Dialysis Facility (if applicable)   Name:  Address:  Dialysis Schedule:  Phone:  Fax:    / signature: Electronically signed by ROMELIA Condon on 22 at 10:36 AM EST    PHYSICIAN SECTION    Prognosis: {Prognosis:3864473617}    Condition at Discharge: [address] Patient Condition:038152602}    Rehab Potential (if transferring to Rehab): {Prognosis:5537621269}    Recommended Labs or Other Treatments After Discharge: ***    Physician Certification: I certify the above information and transfer of Drew Antonio  is necessary for the continuing treatment of the diagnosis listed and that she requires {Admit to Appropriate Level of Care:42436} for {GREATER/LESS:333738886} 30 days.      Update Admission H&P: [facility]330[facility]}    PHYSICIAN SIGNATURE:  {Esignature:977959250}

## 2022-01-14 NOTE — PROGRESS NOTES
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 Discussed possible need for stool to step up on. transfers preformed from varied surfaces and surface heights with and without chair arms. Patient requires use of UE support to preform transfer. Ambulation 1  Surface: level tile  Device: Rolling Walker  Assistance: Modified Independent  Quality of Gait: deviated LLE with noted inversion ankle during swing phase; improved swing neutral hip noted. Gait Deviations: Increased BRISEYDA; Deviated path  Distance: 2MWT 204; 10 feet; 50 feet with 2 turns; 150 feet  Comments: patient able to  a pen from floor with steadying with use of RW and a reacher  Ambulation 2  Surface - 2: ramp  Device 2: [address] 2: Modified Independent  Quality of Gait 2: deviated LLE with noted inversion ankle during swing phase; improved swing neutral hip noted. Gait Deviations: Deviated path; Increased BRISEYDA  Distance: 65 feet  Comments: control speed appropriately and clear foot  Ambulation 3  Surface - 3: carpet;uneven  Device 3: Rolling Walker  Assistance 3: Modified Independent  Quality of Gait 3: deviated LLE with noted inversion ankle during swing phase; improved swing neutral hip noted. Gait Deviations: Slow Sharon; Increased BRISEYDA; Deviated path  Distance: 10 feet  Stairs  # Steps : 12  Stairs Height: 8\"  Rails: Bilateral;Left ascending (8 steps BHR 4 steps Left HR)  Device: Rolling walker  Assistance: Stand by assistance  Comment: step to pattern; patient states does not preform flight of steps at home.         Other exercises  Other exercises 1: VR system 32 minutes tolerated with cervical ROM, Shoulder ROM, core trunk stability  Other exercises 2: Seated BLE circulation 20-30 reps  Other exercises 5: push ups seated 5 x2 sets                        G-Code     OutComes Score                                                     AM-PAC Score             Goals  Short term goals  Time Frame for Short term goals: 1 week  Short term goal 1: Pt will ambulate 200 feet with a RW and SBA to increase functional independence. Short term goal 2: Pt able to perform bed mobility at SBA  Short term goal 3: Pt will demonstrate good- standing balance with rolling walker to decrease fall risk. Short term goal 4: Pt will perform sit<>stand and pivot transfer with rolling walker SBA to increase functional independence. Short term goal 5: Pt will negotiate 5 stair with 2 handrail and CGA to allow the pt to enter prior living arrangements. Long term goals  Time Frame for Long term goals : By DC  Long term goal 1: Pt able to perform bed mobility mod-I  Long term goal 2: Pt able to transfer at mod-I  Long term goal 3: Pt able to ambulate with rolling walker distance of 150 ft, mod-I, level surfaces  Long term goal 4:  Pt able to ambulate on incline surface at SBA with rolling walker. Long term goal 5: Pt able to get up and down curb steps with UE support, CGA  Long term goal 6: Improve 2MWT distance to 100 ft to improve gait speed and overall fucntion. Long term goal 7: Pt able to go up and 12 steps with 2 UE support at SBA to improve B LE strength. Patient Goals   Patient goals : Go home soon    Plan    Plan  Times per week: 1.5 hr/day, 5 to 7 days/week.   Times per day: Daily  Current Treatment Recommendations: Strengthening,ROM,Stair training,Balance Training,Gait Training,Patient/Caregiver Education & Training,Equipment Evaluation, Education, & procurement,Neuromuscular Re-education,Functional Mobility Training,Endurance Training,Transfer Training,Safety Education & Training,Home Exercise Program  Safety Devices  Type of devices: Gait belt,Patient at risk for falls,All fall risk precautions in place  Restraints  Initially in place: No     Therapy Time   Individual Concurrent Group Co-treatment   Time In 0758         Time Out 0908         Minutes Phong 113, PTA

## 2022-01-14 NOTE — DISCHARGE SUMMARY
Physical Medicine & Rehabilitation  Discharge Summary     Patient Identification:  Kristina Cabrera  : 1960  Admit date: 2022  Discharge date: 2022   Attending provider: Walker Ortiz MD      Discharging provider: Felipe Geiger MD    Primary care provider: Greg Huston MD     Discharge Diagnoses:   Cauda equina syndrome secondary to spinal deformity  Chronic Pain  Pseudomonas UTI  HTN  GERD  Hypothyroidism  Blood loss anemia  Anxiety    Discharge Functional Status:    Physical therapy:  Bed Mobility: Rolling: Modified independent  Supine to Sit: Modified independent  Sit to Supine: Modified independent  Scooting: Modified independent  Transfers: Sit to Stand: Modified independent  Stand to sit: Modified independent  Bed to Chair: Modified independent  Squat Pivot Transfers: Modified independent  Comment: rest breaks PRN. , Ambulation 1  Surface: level tile  Device: Rolling Walker  Assistance: Modified Independent  Quality of Gait: deviated LLE with noted inversion ankle during swing phase; improved swing neutral hip noted. Gait Deviations: Increased BRISEYDA,Deviated path  Distance: 2MWT 204; 10 feet; 50 feet with 2 turns; 150 feet  Comments: Mirror therapy helped patient to improve technique with LLE swing phase. Patient pleased to see progress, Stairs  # Steps : 12  Stairs Height: 8\"  Rails: Bilateral,Left ascending (8 steps BHR 4 steps Left HR)  Curbs: 8\"  Device: Rolling walker  Assistance: Stand by assistance  Comment: step to pattern; patient states does not preform flight of steps at home. Mobility:  , PT Equipment Recommendations  Equipment Needed: No, Assessment: Pt required Miryam for bed mobility for BLE progression and trunk stability. Pt ambulated 20 to 40 ft with CGA/min A using a RW and required increased time and effort to complete due to slow christine.  Pt is limited by decreased BLE strength and would benefit from continued PT following discharge to address functional deficits. Occupational therapy:  , Equipment Recommendations  Equipment Needed: Yes  Hand Held Shower: x  Transfer Tub Bench: x  Grab bars: x  Reacher: x  Sock Aid: x,      Speech therapy:         Inpatient Rehabilitation Course:   Zoila Han is a 62 y.o. female admitted to inpatient rehabilitation on 1/4/2022 for rehab for cauda equina syndrome. INITIAL HPI:  28-year-old female Ms. Li 12/30. Has history of hypertension hypothyroidism as well as reflux. Need for lumbar laminectomy. She has history of possible cauda equina syndrome. Has history of lumbar spine surgery in past as well as spinal lumbar surgery in past as well as cervical spine surgery. .  She notes lower extremity weakness. Notes some difficulty with bowels or bladder. Underwent lumbar laminectomy L2-S1 on 12/30 with Dr. Freddie Perez.     General surgery noticed passing flatus not had bowel movement as of 1/3 - managed conservatively.     Internal medicine 1/4 question large BM 1/4 ,drain removed 1/3 ,to complete ciprofloxacin through 1/13 for UTI , urinary retention resolved with bowel movement , Bruce removed without retention continue iron for anemia-use as needed    Patient evaluated today:  GEN: well developed, well nourished, NAD  HEENT: NCAT, PERRL, EOMI, mucous membranes pink and moist  CV: RRR, no murmurs, rubs or gallops  PULM: CTAB, no rales or rhonchi. Respirations WNL and unlabored  ABD: soft, NT, ND, BS+ and equal  NEURO: A&O x3. Sensation intact to light touch. MSK: Functional ROM all extremities . Strength 5/5 key muscles RLE, 4+/5 key muscles LLE, 5/5 key muscles BUEs. EXTREMITIES: No calf tenderness to palpation bilaterally. No edema BLEs  SKIN: warm dry and intact with good turgor. Well approximated midline lumbosacral incision with sutures intact. PSYCH: appropriately interactive. Affect WNL.      Rehab course:   Patient's pain was controlled on her home dose of methadone with infrequent use of home dose Norco prn. She completed course of Ciprofloxacin for pseudomonas UTI on 1/13/22. Her hemoglobin was low but stable. Her constipation/ileus resolved and she was having daily BMs. Ferrous sulfate was resumed on 1/11/22. Sutures were removed 1/13/22 after approval received from Dr. Nadia Argueta. Chronic conditions were stable on home medications. The patient participated in an aggressive multidisciplinary inpatient rehabilitation program involving 3 hours per day, 5 days per week of rehabilitation. Patient benefited from inpatient rehab and was discharged in good stable condition. Consults:   Internal Medicine    Significant Diagnostics:   CBC:   Lab Results   Component Value Date    WBC 3.9 01/12/2022    RBC 2.89 01/12/2022    HGB 9.4 01/12/2022    HCT 27.2 01/12/2022    MCV 94.4 01/12/2022    MCH 32.7 01/12/2022    MCHC 34.7 01/12/2022    RDW 13.3 01/12/2022     01/12/2022    MPV 6.7 01/12/2022     BMP:    Lab Results   Component Value Date     01/12/2022    K 4.2 01/12/2022     01/12/2022    CO2 28 01/12/2022    BUN 9 01/12/2022    CREATININE 0.82 01/12/2022    CALCIUM 9.2 01/12/2022    GFRAA >60 01/12/2022    LABGLOM >60 01/12/2022    GLUCOSE 118 01/12/2022         Patient Instructions:     Activity as tolerated    Medications, precautions and follow up reviewed with patient and family    Follow-up visits: See after visit summary from hospitalization: PCP 1-2 weeks, Dr. Juan Carlos Clemens (pain management) 2 weeks, Dr. Nadia Argueta 1/17/2022, PM&R Dr. Argentina Funk 4-6 weeks    Disposition:  Home with outpatient physical therapy    Discharge Medications:     Medication List      START taking these medications    docusate sodium 100 MG capsule  Commonly known as: COLACE  Take 2 capsules by mouth 2 times daily     ferrous sulfate 325 (65 Fe) MG tablet  Commonly known as: IRON 325  Take 1 tablet by mouth 2 times daily (with meals)     senna 8.6 MG tablet  Commonly known as: SENOKOT  Take 2 tablets by mouth 2 times daily        CONTINUE taking these medications    busPIRone 30 MG tablet  Commonly known as: BUSPAR  Take 30 mg by mouth 2 times daily     dilTIAZem 180 MG extended release capsule  Commonly known as: DILACOR XR  Take 1 capsule by mouth daily     HYDROcodone-acetaminophen 7.5-325 MG per tablet  Commonly known as: NORCO     levothyroxine 88 MCG tablet  Commonly known as: SYNTHROID  Take 1 tablet by mouth Daily     methadone 10 MG tablet  Commonly known as: DOLOPHINE     pantoprazole 40 MG tablet  Commonly known as: PROTONIX  TAKE ONE TABLET BY MOUTH DAILY     Potassium Gluconate 595 (99 K) MG Tabs  TAKE ONE TABLET BY MOUTH DAILY     tiZANidine 4 MG tablet  Commonly known as: ZANAFLEX     Vitamin D (Ergocalciferol) 50 MCG (2000 UT) Caps  TAKE ONE CAPSULE BY MOUTH DAILY           Where to Get Your Medications      You can get these medications from any pharmacy    You don't need a prescription for these medications  · docusate sodium 100 MG capsule  · ferrous sulfate 325 (65 Fe) MG tablet  · senna 8.6 MG tablet          Jaron Antonio MD

## 2022-01-14 NOTE — DISCHARGE INSTR - DIET

## 2022-01-14 NOTE — PROGRESS NOTES
[address] [facility]    ACUTE REHABILITATION OCCUPATIONAL THERAPY  DAILY NOTE    Date: 22  Patient Name: Manuela Sampson      Room: 5903/4569-05    MRN: [id number]   : 1960  (62 y.o.)  Gender: female      Diagnosis: Lumbar stenosis with neurogenic claudication, L2-S1 laminectomy on 21 for cauda equina syndrome (Dr. Blas Beckham), Cervical stenosis with myelopathy, decreased BLE strength , L weaker than R,  Additional Pertinent Hx: worsening function over past 2 months - has needed more assist with advanced adls. wears pullups at home due to urgency issues. needs cervical spine surgery when able post this surgery. Left facial symptoms: could be early bells palsy    Restrictions  Restrictions/Precautions: General Precautions,Surgical Protocols,Fall Risk,Up as Tolerated  Spinal Precautions: lumbar spine surgery  Other position/activity restrictions: Activity as tolerated. per MD discharge papers pt OK to shower 3-4 days post surgery  Required Braces or Orthoses?: No    Subjective  Subjective: \"I want to take a shower\"   Comments: Pt is pleasant and motivated for therapy. Patient Currently in Pain: Denies  Restrictions/Precautions: General Precautions;Surgical Protocols; Fall Risk;Up as Tolerated  Overall Orientation Status: Within Functional Limits  Patient Observation  Observations: Pt report L elbow \"burning\" from scooting self up in bed. Redness noted. DENA Jones notified.         Objective  Cognition  Overall Cognitive Status: WFL  Perception  Overall Perceptual Status: WFL  Balance  Sitting Balance: Modified independent   Standing Balance: Modified independent  (intermittent UE support)  Transfers  Sit to stand: Stand by assistance;Supervision  Stand to sit: Supervision;Stand by assistance  Transfer Comments: good hand placements noted  Standing Balance  Time: 3-4 min, 2-3 min  Activity: functional mobility, item retrieval, ADLs  Comment: no LOB noted  Functional Mobility  Functional - Mobility Device: Rolling Walker  Activity: Retrieve items;Transport items; To/from bathroom  Assist Level: Supervision  Functional Mobility Comments: slight ataxic gait noted/unsteady but no gross LOB noted  Shower Transfers  Shower - Transfer From: Fahad Esquivel - Transfer Type: To and From  Shower - Transfer To: Transfer tub bench  Shower - Technique: Ambulating  Shower Transfers: Stand by assistance  Shower Transfers Comments: SBA, VCs for tech and increased stride height crossing threshold, good carryover noted, no LOB                    Light Housekeeping  Light Housekeeping Level: Wheelchair  Light Housekeeping Level of Assistance: Supervision  Light Housekeeping: Pt gathers items in prep for d/c using basin for item transport while in w/c. ADL  Feeding: Modified independent ;Dentures  Grooming: Modified independent  (seated at the sink)  UE Bathing: Modified independent   LE Bathing: Modified independent   UE Dressing: Modified independent   LE Dressing: Modified independent   Toileting: None (not req at this time. )  Additional Comments: HUNG facilitated pt in full shower this AM seated on tub bench. Pt ambulates to gather clothing prior and demos with good safety awareness throughout. Good use of GB for standing. Instrumental ADL's  Instrumental ADLs: Yes  Light Housekeeping  Light Housekeeping Level: Wheelchair  Light Housekeeping Level of Assistance: Supervision  Light Housekeeping: Pt gathers items in prep for d/c using basin for item transport while in w/c. Assessment  Performance deficits / Impairments: Decreased functional mobility ; Decreased ADL status; Decreased endurance;Decreased high-level IADLs;Decreased balance;Decreased coordination;Decreased sensation;Decreased posture;Decreased ROM; Decreased strength;Decreased fine motor control  Prognosis: Good  Discharge Recommendations: Patient would benefit from continued therapy after discharge  Activity Tolerance: Patient Tolerated treatment well  Safety Devices in place: Yes  Type of devices: Left in chair;Call light within reach; All fall risk precautions in place; Patient at risk for falls  Restraints  Initially in place: No  Equipment Recommendations  Equipment Needed: Yes  Hand Held Shower: x  Transfer Tub Bench: x  Grab bars: x  Reacher: x  Sock Aid: x       Patient Education: OT POC, energy conservation and pacing with ADLs and functional tasks, bathroom modifications for ease and safety with grooming tasks, safety awareness with functional mobility and use of RW. Patient Goals   Patient goals : walk safely to be home alone while spouse at work  Learner:patient  Method: explanation       Outcome: acknowledged understanding of         Plan  Plan  Times per week: 5-7  Times per day: Twice a day  Current Treatment Recommendations: Strengthening,Patient/Caregiver Education & Training,Home Management Training,Equipment Evaluation, Education, & procurement,Balance Training,Functional Mobility Training,Endurance Training,Safety Education & Training,Self-Care / ADL  Patient Goals   Patient goals : walk safely to be home alone while spouse at work  Short term goals  Time Frame for Short term goals: 1 week  Short term goal 1: min assist LE self care  Short term goal 2: CGA amb to obtain set up for adls with good walker technique  Short term goal 3: joseph 8-9 min stand , ambulate for adls with RW and CGA  Short term goal 4: joseph 10 reps x 2 B shld ex with 1 lb weight on L and 2 lb weight on R  and 20 reps x 2 elbow ex with 2 lb wt on L and 3 lb on R for work joseph and UE strengthening. Short term goal 5: joseph repetitive  ex LUE with mod resistance for hand strengthening.   Long term goals  Time Frame for Long term goals : by discharge  Long term goal 1: mod indep self care  Long term goal 2: mod indep amb to obtain set up for adls with good walker safety  Long term goal 3: joseph 11-13 min stand mod indep during adls  Long term goal 4: mod assist with transfer to tub to use tub seat vs remove shower doors for extended tub bench transfer with min assist.  Long term goal 5: mod indep with simple advanced adls with good walker safety and good balance. Long term goals 6: indep with BUE HEP for strengthening and coordination. Long term goal 7: increase L hand  strength to 40 pounds.         01/14/22 1518   OT Individual Minutes   Time In 0908   Time Out 1004   Minutes 56     Electronically signed by GOOD Olivera on 1/14/22 at 3:18 PM EST

## 2022-01-17 ENCOUNTER — OFFICE VISIT (OUTPATIENT)
Dept: NEUROSURGERY | Age: 62
End: 2022-01-17

## 2022-01-17 VITALS
WEIGHT: 142 LBS | OXYGEN SATURATION: 100 % | SYSTOLIC BLOOD PRESSURE: 166 MMHG | DIASTOLIC BLOOD PRESSURE: 91 MMHG | HEART RATE: 108 BPM | BODY MASS INDEX: 26.81 KG/M2 | HEIGHT: 61 IN

## 2022-01-17 DIAGNOSIS — Z98.890 S/P LUMBAR LAMINECTOMY: ICD-10-CM

## 2022-01-17 DIAGNOSIS — G83.4 CAUDA EQUINA SYNDROME (HCC): Primary | ICD-10-CM

## 2022-01-17 PROCEDURE — 99024 POSTOP FOLLOW-UP VISIT: CPT | Performed by: NURSE PRACTITIONER

## 2022-01-17 NOTE — PROGRESS NOTES
[address]  [facility] [address] [facility] [address]  Dept: [phone]    Patient:  Yosef Clarke  YOB: 1960  Date: 1/17/22    The patient is a 62 y.o. female who presents today for consult of the following problems:     Chief Complaint   Patient presents with    Post-Op Check         HPI:     Yosef Clarke is a 62 y.o. female who presents to the office for post-op evaluation s/p L2-S1 lumbar laminectomy for cauda equina syndrome. Patient has been doing well postoperatively, was released from acute rehab facility this past Friday. Does have outpatient physical therapy orders to continue working on lower extremity stretching, strengthening. Able to ambulate with her walker. Incision has healed well, sutures removed at rehab facility. Patient reports normal bowel/bladder function. Denies any retention, incontinence. Denies any fevers, chills. Bilateral upper extremity 5/5  Right lower extremity 4+/5  Left lower extremity 4+/5, 4/5 hip flexion  Incision CDI  Sensation intact    Date of surgery: 12/30/2021    Assessment and Plan:      1. Cauda equina syndrome ([address]    2. S/P lumbar laminectomy          Plan: Patient improving postoperatively. Improved strength, sensation, as well as resolution to bowel/bladder dysfunction. Still with fair amount of difficulty with ambulation, able to walk with the use of a walker. Does have outpatient physical therapy orders, plans to call today to schedule. Next postop visit in 6 weeks with Dr. Nic Guzmán as planned. Followup: Return in about 6 weeks (around 2/28/2022), or if symptoms worsen or fail to improve. Prescriptions Ordered:  No orders of the defined types were placed in this encounter. Orders Placed:  No orders of the defined types were placed in this encounter.        Electronically signed by CASSANDRA Rome CNP on 1/17/2022 at 2:11 PM    Please note that this chart was generated using voice recognition Dragon dictation software. Although every effort was made to ensure the accuracy of this automated transcription, some errors in transcription may have occurred.

## 2022-01-18 ENCOUNTER — TELEPHONE (OUTPATIENT)
Dept: PRIMARY CARE CLINIC | Age: 62
End: 2022-01-18

## 2022-01-18 NOTE — TELEPHONE ENCOUNTER
[facility] Transitions Initial Follow Up Call    Outreach made within 2 business days of discharge: Yes    Patient: Javier Purdy Patient : 1960   MRN: [id number]  Reason for Admission: Cauda equina syndrome  Discharge Date: 22       Spoke with: Patient did not answer, left VM    Discharge department/facility: [facility]     TCM Interactive Patient Contact:    Scheduled appointment with PCP within 7-14 days    Follow Up  Future Appointments   Date Time Provider [place]   2022  3:00 PM Shameka Green MD [facility] med/reha [facility]   2022 10:30 AM DO Geovanny Roblero Neuro [facility]   3/18/2022 10:40 AM Kimberly Matt MD [place], [place]

## 2022-01-21 ENCOUNTER — HOSPITAL ENCOUNTER (OUTPATIENT)
Dept: PHYSICAL THERAPY | Age: 62
Setting detail: THERAPIES SERIES
Discharge: HOME | End: 2022-01-21
Payer: MEDICARE

## 2022-01-21 PROCEDURE — 97110 THERAPEUTIC EXERCISES: CPT

## 2022-01-21 PROCEDURE — 97162 PT EVAL MOD COMPLEX 30 MIN: CPT

## 2022-01-21 NOTE — PROGRESS NOTES
[address] [facility] Outpatient Physical Therapy  [address] [place] [address]. [address]         Phone: [phone]       Fax: [phone]    Physical Therapy Lower Extremity Evaluation    Date:  2022  Patient: Clive Burton  : 1960  MRN: [id number]  Physician: *   Insurance: Medicare ([address] [place] [address]) TRACK CAP  Medical Diagnosis:  G83.4 (ICD-10-CM) - Cauda equina syndrome ([address]  M48.062 (ICD-10-CM) - Lumbar stenosis with neurogenic claudication       Rehab Codes: Difficulty walking (R26.2), Muscle weakness (generalized) M62. 81, Low back pain M54. 50  Onset date: 21  Next Dr's appt. : 22  Visit Count:   Cancel/No Show: 0/0    Subjective:   CC:  HPI: (onset date) Patient is s/p L2-S1 lumbar laminectomy for cauda equina syndrome. Prior to surgery patient reports she was dragging her left leg and lost bowel and bladder function. Post op she went to IP rehab at [facility] for 10 days and was DC from there a week ago. Patient says her bowel/bladder function is getting better and her strength and walking is getting better as well. She says she lives with chronic pain and 5/10 is the lowest it gets pre/post surgery. Patient goes to pain management and has for years. Patient says her neck and left shoulder bother her as well and she will likely have neck surgery once the back is healed. Patient was using the rolling walker on/off for the last 6 months and always for the last 2 months. PMHx: [] Unremarkable [] Diabetes [] HTN  [] Pacemaker   [] MI/Heart Problems [] Cancer [] Arthritis [] Other:              [x] Refer to full medical chart  In EPIC     Comorbidities:  Previous back surgeries, chronic pain. Hx of falls- HIGH FALL RISK.    Medications: [x] Refer to full medical record [] None [] Other:  Allergies:      [x] Refer to full medical record [] None [] Other:    Function:  Hand Dominance  [x] Right  [] Left  Working:  [] Normal Duty  [] Light Duty  [] Off D/T Condition  [] Retired    [] Not Employed    [x]  Disability  [] Other:           Return to work:   Job/ADL Description:    ADL/IADL Previous level of function Current level of function Who currently assists the patient with task   Bathing  [] Independent  [x] Assist [] Independent  [x] Assist  helps with all below    Dress/grooming [] Independent  [x] Assist [] Independent  [x] Assist    Transfer/mobility [] Independent  [x] Assist [] Independent  [x] Assist    Feeding [x] Independent  [] Assist [x] Independent  [] Assist    Toileting [x] Independent  [] Assist [x] Independent  [] Assist    Driving [] Independent  [x] Assist [] Independent  [x] Assist    Housekeeping [] Independent  [x] Assist [] Independent  [x] Assist    Grocery shop/meal prep [] Independent  [x] Assist [] Independent  [x] Assist      Gait Prior level of function Current level of function    [x] Independent  [] Assist [x] Independent  [] Assist   Device: [] Independent [] Independent    [] Straight Cane [] Quad cane [] Straight Cane [] Quad cane    [] Standard walker [x] Rolling walker   [] 4 wheeled walker [] Standard walker [x] Rolling walker   [] 4 wheeled walker    [] Wheelchair [] Wheelchair     Pain:  [x] Yes  [] No Location: neck to back Pain Rating: (0-10 scale) 5/10  Pain altered Tx:  [] Yes  [] No  Action:  Symptoms:  [] Improving [] Worsening [x] Same      Objective:    ROM  ° A/P STRENGTH TESTS (+/-) Left Right Not Tested    Left Right Left Right Ant.  Drawer   []   Hip Flex 3- 4   Post. Drawer   []   Ext     Lachmans   []   ER 3- 4   Valgus Stress   []   IR 3+ 4   Varus Stress   []   ABD 3- 4   Bethanys   []   ADD     Apleys Comp.   []   Knee Flex 3+ 4+   Apleys Dist.   []   Ext 4 4+   Hip Scouring   []   Ankle DF 3- 4+   ALENs   []   PF     Piriformis   []   INV 3- 4   Ryans   []   EVER 3- 4   Talor Tilt   []        Pat-Fem Grind   []     Core  Strength : B supine SLR- pt unable to lift LLE and unable to hold due to lower abdominal weakness and LLE weakness s/p cauda equina syndrome. Coordination: alternating toe taps- Left delayed, Heel to shin- delayed with decreased range and overshoot on left. OBSERVATION No Deficit Deficit Not Tested Comments   Posture       Forward Head [] [] []    Rounded Shoulders [] [] []    Kyphosis [] [] []    Lordosis [] [] []    Lateral Shift [] [] []    Scoliosis [] [] []    Iliac Crest [] [] []    PSIS [] [] []    ASIS [] [] []    Genu Valgus [] [] []    Genu Varus [] [] []    Genu Recurvatum [] [] []    Pronation [] [] []    Supination [] [] []    Leg Length Discrp [] [] []    Slumped Sitting [] [] []    Palpation [] [] []    Sensation [] [] []    Edema [] [] []    Neurological [] [] []    Patellar Mobility [] [] []    Patellar Orientation [] [] []    Gait [] [x] [] Analysis:Patient ambulates with rolling walker with decreased left foot clearance in left swing phase with decreased knee flexion in left swing phase as well. FUNCTION Normal Difficult Unable   Sitting [] [] []   Standing [] [] []   Ambulation [] [] []   Groom/Dress [] [] []   Lift/Carry [] [] []   Stairs [] [] []   Bending [] [] []   Squat [] [] []   Kneel [] [] []       FUNCTIONAL TESTS COMMENTS   Timed up and go 26\" with rolling walker    5x sit to stand test Attempted but unable to perform sit to stand without UE support. Assessment: [] Patient would continue to benefit from skilled physical therapy services in order to address LE/core weakness, abnormal gait, and imbalance to improve patients functional mobility to complete ADLs/IADLs with least amount of compensation or restriction. Patient is a HIGH FALL RISK based on TUG score of 26\" with rolling walker as well as hx of falls. Patient with significant L LE weakness s/p to cauda equina syndrome. She presents with abnormal gait including decreased left foot clearance and heavy reliance on rolling walker.  Patient with minimal hip AROM against gravity (left more significant than right). She also exhibits lower extremity coordination deficits that are remaining s/p cauda equina syndrome operation. Patient is unable to complete sit to stand without use of UEs secondary to weakness that also puts her at risk for falls d/t decreased functional LE strength. Patient will benefit from core/LE strengthening exercises, neuro re-ed and gait training to address the above deficits as patients goal is to ambulate safely without an AD. STG: (to be met in 9 treatments)  1. ? Strength: of L LE to 4+/5 to improve functional strength to ease completion of transfers. 2. ? Function: Patient to complete TUG in <15\" with RW to decrease fall risk. 3. Patient to ambulate with RW with normal gait pattern. 4. Independent with Home Exercise Programs  5. Demonstrate Knowledge of fall prevention  LTG: (to be met in 18 treatments)  1. Patient to complete 5x sit to stand test in <20\" without UE support to improve functional strength for transfers. 2. Patient to ambulate without AD with adequate left foot clearance and no evidence of imbalance.                     Patient goals: to walk without the walker       Functional Assessment Used:  Timed Up and Go (TUG)  Current Status: Score 26\" with RW  Goal Status: Score : <15\" with RW    Evaluation Complexity:  History (Personal factors, comorbidities) [] 0 [x] 1-2 [] 3+   Exam (limitations, restrictions) [] 1-2 [x] 3 [] 4+   Clinical presentation (progression) [] Stable [x] Evolving  [] Unstable   Decision Making [] Low [x] Moderate [] High    [] Low Complexity [x] Moderate Complexity [] High Complexity       Rehab Potential:  [x] Good  [] Fair  [] Poor   Suggested Professional Referral:  [x] No  [] Yes:  Barriers to Goal Achievement[de-identified]  [x] No  [] Yes:  Domestic Concerns:  [x] No  [] Yes:    Pt. Education:  [x] Plans/Goals, Risks/Benefits discussed  [x] Home exercise program     Method of Education: [x] Verbal  [x] Demo  [x] Written  Exercises  Supine March - 1 x daily - 7 x weekly - 3 sets - 10 reps  Supine Posterior Pelvic Tilt - 1 x daily - 7 x weekly - 3 sets - 10 reps  Active Straight Leg Raise with Quad Set - 1 x daily - 7 x weekly - 3 sets - 10 reps  Comprehension of Education:  [x] Verbalizes understanding. [x] Demonstrates understanding. [] Needs Review. [] Demonstrates/verbalizes understanding of HEP/Ed previously given. Treatment Plan:  [x] Therapeutic Exercise   33251  [] Iontophoresis: 4 mg/mL Dexamethasone Sodium Phosphate  mAmin  70891   [x] Therapeutic Activity  81846 [] Vasopneumatic cold with compression  41973    [x] Gait Training   92247 [] Ultrasound   92228   [x] Neuromuscular Re-education  31508 [] Electrical Stimulation Unattended  53284   [] Manual Therapy  07571 [] Electrical Stimulation Attended  38232   [x] Instruction in HEP  [] Lumbar/Cervical Traction  16285   [] Aquatic Therapy   09003 [x] Cold/hotpack    [] Massage   80165      [] Dry Needling, 1 or 2 muscles  32959   [] Biofeedback, first 15 minutes   47166  [] Biofeedback, additional 15 minutes   08358 [] Dry Needling, 3 or more muscles  20021         Frequency:  3x/week for 18 visits        Todays Treatment:  Modalities:   Precautions:  Exercises:  Exercise Reps/ Time Weight/ Level Comments   Nustep 6-8'     PPT    HEP   PPT with march   HEP   SLR mid range   HEP   SL hip abduction      SL Hip extension    Pt reports she cannot lay prone   SL clam       Bridges             Repeated Sit to stands       Eric step overs L      Step ups F/L       3 way hip       Cone taps                                     Other:    Specific Instructions for next treatment: hip/ core strengthening, functional strengthening and work on L foot clearance, progress to standing LE/core strengthening.      Treatment Charges: Mins Units   [] Evaluation       []  Low       [x]  Moderate       []  High 40 1   []  Modalities     [x]  Ther Exercise 10 1   []  Manual Therapy     []  Ther Activities     [] Aquatics     []  Neuromuscular     []  Gait Training     []  Dry Needling           1-2 muscles     []  Dry Needling           3 or more          muscles     [] Vasocompression     []  Other         TOTAL TREATMENT TIME: 50 minutes     Time in:1000am   Time Out: 1052am    Electronically signed by: Rose Lutz PT        Physician Signature:________________________________Date:__________________  By signing above or cosigning this note, I have reviewed this plan of care and certify a need for medically necessary rehabilitation services.      *PLEASE SIGN ABOVE AND FAX BACK ALL PAGES*

## 2022-01-24 ENCOUNTER — HOSPITAL ENCOUNTER (OUTPATIENT)
Dept: PHYSICAL THERAPY | Age: 62
Setting detail: THERAPIES SERIES
Discharge: HOME | End: 2022-01-24
Payer: MEDICARE

## 2022-01-24 PROCEDURE — 97110 THERAPEUTIC EXERCISES: CPT

## 2022-01-24 NOTE — FLOWSHEET NOTE
[address] Out[facility] Physical [facility]   [address] [facility] [address]   Phone: [phone]   Fax: [phone]    Physical Therapy Daily Treatment Note      Date:  2022  Patient Name:  Berenda Goltz    :  1960  MRN: [id number]  Physician: Ebony Mercado MD      Insurance: Medicare ([address] [place] [address]) TRACK CAP  Medical Diagnosis:   G83.4 (ICD-10-CM) - Cauda equina syndrome ([address]  M48.062 (ICD-10-CM) - Lumbar stenosis with neurogenic claudication                   Rehab Codes: Difficulty walking (R26.2), Muscle weakness (generalized) M62. 81, Low back pain M54. 50  Onset date: 21             Next 's appt. : 22  Visit Count:                     Cancel/No Show: 0/0    Subjective:  Patient stating she isn't sure if she is taking her mm relaxer correctly. No new complaints or concerns since last tx. Patient states she completes HEP to the best of her ability as she does not have a suitable surface to complete all exercises properly.   Pain:  [x] Yes  [] No Location: low back and B LEs Pain Rating: (0-10 scale) 7/10- pain is usually at a 5/10  Pain altered Tx:  [] No  [] Yes  Action:  Comments:    Objective:  Modalities:   Precautions:  Exercises:  Exercise Reps/ Time Weight/ Level Comments Completed   Nustep 6-8'  3   X   Abdominal bracing 15x 5\" holds   X   PPT   15x   HEP X   PPT with march  15x2   HEP X   SLR mid range  10x   HEP X   SL hip abduction          SL Hip extension      Pt reports she cannot lay prone    SL clam   10x2     X   Bridges   10x2    Pt c/o burning in BLEs R>L X   Heel slides + TrA 10x   X   Hip ADD + TrA 15x 3\" holds   X                            Repeated Sit to stands   5x2   With use of BUEs to assist with pushing into standing this date X   Eric step overs L          Step ups F/L           3 way hip           Cone taps                                                                  Other:     Specific Instructions for next treatment: hip/ core strengthening, functional strengthening and work on L foot clearance, progress to standing LE/core strengthening. Assessment: [] Progressing toward goals. [] No change. [] Other:    [x] Patient would continue to benefit from skilled physical therapy services in order to: address the above deficits as patients goal is to ambulate safely without an AD    1/24: Patient ambulates into clinic with [address] demonstrating decreased heel strike/toe off on BLEs and decreased knee flexion in swing phase. Patient stating she feels like jello post NuStep. Focused on mat exercises with TrA activation emphasized throughout session. Patient reporting muscle burn in BLEs. Completed session with STS transfers with BUe support. Cues as needed to encourage TrA activation and correct posture. STG: (to be met in 9 treatments)  1. ? Strength: of L LE to 4+/5 to improve functional strength to ease completion of transfers. 2. ? Function: Patient to complete TUG in <15\" with RW to decrease fall risk. 3. Patient to ambulate with RW with normal gait pattern. 4. Independent with Home Exercise Programs  5. Demonstrate Knowledge of fall prevention  LTG: (to be met in 18 treatments)  1. Patient to complete 5x sit to stand test in <20\" without UE support to improve functional strength for transfers. 2. Patient to ambulate without AD with adequate left foot clearance and no evidence of imbalance.                     Patient goals: to walk without the walker     Pt. Education:  [x] Yes  [] No  [] Reviewed Prior HEP/Ed  Method of Education: [x] Verbal  [] Demo  [] Written  Comprehension of Education:  [x] Verbalizes understanding. [] Demonstrates understanding. [] Needs review. [] Demonstrates/verbalizes HEP/Ed previously given. Plan: [x] Continue per plan of care.    [] Other:      Treatment Charges: Mins Units   []  Modalities     [x]  Ther Exercise 45 3   []  Manual Therapy     []  Ther Activities     []  Aquatics []  Neuromuscular     [] Vasocompression     [] Gait Training     [] Dry needling        [] 1 or 2 muscles        [] 3 or more muscles     []  Other     Total Treatment time 45 3     Time In:1800           Time Out: 8834    Electronically signed by:  Nenita Santiago PTA

## 2022-01-26 ENCOUNTER — HOSPITAL ENCOUNTER (OUTPATIENT)
Dept: PHYSICAL THERAPY | Age: 62
Setting detail: THERAPIES SERIES
Discharge: HOME | End: 2022-01-26
Payer: MEDICARE

## 2022-01-26 PROCEDURE — 97110 THERAPEUTIC EXERCISES: CPT

## 2022-01-26 NOTE — FLOWSHEET NOTE
[facility] Outpatient Physical Therapy   [address]   Phone: [phone]   Fax: [phone]    Physical Therapy Daily Treatment Note      Date:  2022  Patient Name:  Mohini Cuello    :  1960  MRN: [id number]  Physician: West Medina MD      Insurance: Medicare ([address] [place] [address]) TRACK CAP  Medical Diagnosis:   G83.4 (ICD-10-CM) - Cauda equina syndrome ([address]  M48.062 (ICD-10-CM) - Lumbar stenosis with neurogenic claudication                   Rehab Codes: Difficulty walking (R26.2), Muscle weakness (generalized) M62. 81, Low back pain M54. 50  Onset date: 21             Next 's appt. : 22  Visit Count: 3/18                    Cancel/No Show: 0/0    Subjective:  Patient reporting to therapy with no new complaints and requests for a seated/standing HEP this session as it is difficult for her to perform supine exercises. Pain:  [x] Yes  [] No Location: low back and B LEs Pain Rating: (0-10 scale)  5/10  Pain altered Tx:  [] No  [] Yes  Action:  Comments:    Objective:  Modalities:   Precautions:  Exercises:  Exercise Reps/ Time Weight/ Level Comments Completed   Nustep 6-8'  3   X   Abdominal bracing 15x 5\" holds      PPT   15x   HEP    PPT with march  15x2   HEP    SLR mid range  10x   HEP    SL hip abduction          SL Hip extension      Pt reports she cannot lay prone    SL clam   10x2        Bridges   10x2    Pt c/o burning in BLEs R>L    Heel slides + TrA 10x      Hip ADD + TrA 15x 3\" holds             Seated          LAQ* 20x2 1#   X   Marching* 20x2 1#  X   Hip ABD/ADD* 20x 3\" Yellow TB  X   Glut sets* 20x 3\"   X          Standing       Mini Squats* 10x2   X   Repeated Sit to stands *  5x2    arms crossed over chest this date Gayathri-Mod A; increased assistance needed with inc fatigue.  X   Marching* 10x2   X   Hip ABD/extension* 10x  BUE support X          Eric step overs L          Step ups F/L           3 way hip           Cone taps                                                               Other:   1/26: (*) Provided for HEP this session. Specific Instructions for next treatment: hip/ core strengthening, functional strengthening and work on L foot clearance, progress to standing LE/core strengthening. Assessment: [] Progressing toward goals. [] No change. [] Other:    [x] Patient would continue to benefit from skilled physical therapy services in order to: address the above deficits as patients goal is to ambulate safely without an AD    1/26: Patient demonstrating decreased proprioception of LLE throughout open chain exercises. Focused on seated and standing core and BLE strengthening exercises this session for HEP as supine exercises have been difficult for her to perform at home. HEP was provided for patient to be able to increase strength and improve balance to be able to perform daily routine activities with increased IND and decreased pain. Patient performed charted exercises above with focus on maintaining core engagement throughout exercises and to maintain proper standing and sitting posture. Frequent sitting rest breaks needed due to fatigue. STG: (to be met in 9 treatments)  1. ? Strength: of L LE to 4+/5 to improve functional strength to ease completion of transfers. 2. ? Function: Patient to complete TUG in <15\" with RW to decrease fall risk. 3. Patient to ambulate with RW with normal gait pattern. 4. Independent with Home Exercise Programs  5. Demonstrate Knowledge of fall prevention  LTG: (to be met in 18 treatments)  1. Patient to complete 5x sit to stand test in <20\" without UE support to improve functional strength for transfers. 2. Patient to ambulate without AD with adequate left foot clearance and no evidence of imbalance.                     Patient goals: to walk without the walker     Pt.  Education:  [x] Yes  [] No  [] Reviewed Prior HEP/Ed  Method of Education: [x] Verbal  [] Demo  [] Written  Comprehension of Education:  [x] Verbalizes understanding. [] Demonstrates understanding. [] Needs review. [] Demonstrates/verbalizes HEP/Ed previously given. Plan: [x] Continue per plan of care.    [] Other:      Treatment Charges: Mins Units   []  Modalities     [x]  Ther Exercise 55 4   []  Manual Therapy     []  Ther Activities     []  Aquatics     []  Neuromuscular     [] Vasocompression     [] Gait Training     [] Dry needling        [] 1 or 2 muscles        [] 3 or more muscles     []  Other     Total Treatment time 55 4     Time In:1530        Time Out: 2534    Electronically signed by:  Rafita Wang PTA

## 2022-01-28 ENCOUNTER — HOSPITAL ENCOUNTER (OUTPATIENT)
Dept: PHYSICAL THERAPY | Age: 62
Setting detail: THERAPIES SERIES
Discharge: HOME | End: 2022-01-28
Payer: MEDICARE

## 2022-01-28 PROCEDURE — 97110 THERAPEUTIC EXERCISES: CPT

## 2022-01-28 NOTE — FLOWSHEET NOTE
[facility] Out[facility]   [address]   Phone: [phone]   Fax: [phone]    Physical Therapy Daily Treatment Note      Date:  2022  Patient Name:  Marisa Alfred    :  1960  MRN: [id number]  Physician: Choco Peña MD      Insurance: Medicare ([address] [place] [address]) TRACK CAP  Medical Diagnosis:   G83.4 (ICD-10-CM) - Cauda equina syndrome ([address]  M48.062 (ICD-10-CM) - Lumbar stenosis with neurogenic claudication                   Rehab Codes: Difficulty walking (R26.2), Muscle weakness (generalized) M62. 81, Low back pain M54. 50  Onset date: 21             Next 's appt. : 22  Visit Count:                     Cancel/No Show: 0/0    Subjective:  Patient states she has her good day and bad days. States that her legs have muscle spasms at times and it causes her to have a hard time moving around her home, patient state she takes her muscle relaxers but can only tolerate half dose because she gets to tired and can't move at all due to fear of falling. Pain:  [x] Yes  [] No Location: low back and B LEs Pain Rating: (0-10 scale)  5/10  Pain altered Tx:  [] No  [] Yes  Action:  Comments:    Objective:  Modalities:   Precautions:  Exercises:  Exercise Reps/ Time Weight/ Level Comments Completed   Nustep 6-8'  3   X   Abdominal bracing 15x 5\" holds      PPT   15x   HEP    PPT with march  15x2   HEP    SLR mid range  10x   HEP    SL hip abduction          SL Hip extension      Pt reports she cannot lay prone    SL clam   10x2        Bridges   10x2    Pt c/o burning in BLEs R>L    Heel slides + TrA 10x      Hip ADD + TrA 15x 3\" holds             Seated          LAQ* 20x2 1#   X   Marching* 20x2 1#  X   Hip ABD/ADD* 20x 3\" Yellow TB  X   Glut sets* 20x 3\"   X          Standing       Mini Squats* 10x2   X   Repeated Sit to stands *  5x2    arms crossed over chest this date Gayathri-Mod A; increased assistance needed with inc fatigue.     Marching* 10x2   X Hip ABD/extension* 10x  BUE support X          Eric step overs L          Step ups F/L   10x 6'   x   3 way hip   10x Yellow    x   Cone taps   10x    limited UE support encouraged  x   Standing hamstring curls  10x 1#   x   (B) tandem stance  30\"x3    Limited UE support encouraged x                                    Other:   1/26: (*) Provided for HEP this session. Specific Instructions for next treatment: hip/ core strengthening, functional strengthening and work on L foot clearance, progress to standing LE/core strengthening. Assessment: [] Progressing toward goals. [] No change. [] Other:    [x] Patient would continue to benefit from skilled physical therapy services in order to: address the above deficits as patients goal is to ambulate safely without an AD    1/28: Patient continues to demonstrate LE strength and proprioceptive deficits especially in left lower extremity. Challenged patient today with multiple standing exercises to address balance/proprioceptive, and strength deficits. Patient tolerated fairly well with cues for posture, and to complete exercise at appropriate pace to avoid over exertion. Patient required multiple short sitting rest breaks but eagerness/motivation evident throughout session. STG: (to be met in 9 treatments)  1. ? Strength: of L LE to 4+/5 to improve functional strength to ease completion of transfers. 2. ? Function: Patient to complete TUG in <15\" with RW to decrease fall risk. 3. Patient to ambulate with RW with normal gait pattern. 4. Independent with Home Exercise Programs  5. Demonstrate Knowledge of fall prevention  LTG: (to be met in 18 treatments)  1. Patient to complete 5x sit to stand test in <20\" without UE support to improve functional strength for transfers. 2. Patient to ambulate without AD with adequate left foot clearance and no evidence of imbalance.                     Patient goals: to walk without the walker     Pt.  Education: [x] Yes  [] No  [] Reviewed Prior HEP/Ed  Method of Education: [x] Verbal  [] Demo  [] Written  Comprehension of Education:  [x] Verbalizes understanding. [] Demonstrates understanding. [] Needs review. [] Demonstrates/verbalizes HEP/Ed previously given. Plan: [x] Continue per plan of care.    [] Other:      Treatment Charges: Mins Units   []  Modalities     [x]  Ther Exercise 45 3   []  Manual Therapy     []  Ther Activities     []  Aquatics     []  Neuromuscular     [] Vasocompression     [] Gait Training     [] Dry needling        [] 1 or 2 muscles        [] 3 or more muscles     []  Other     Total Treatment time 45 3     Time In:900am       Time Out: 945am    Electronically signed by:  Kaiser Callaway PTA

## 2022-01-31 ENCOUNTER — HOSPITAL ENCOUNTER (OUTPATIENT)
Dept: PHYSICAL THERAPY | Age: 62
Setting detail: THERAPIES SERIES
Discharge: HOME | End: 2022-01-31
Payer: MEDICARE

## 2022-01-31 PROCEDURE — 97110 THERAPEUTIC EXERCISES: CPT

## 2022-01-31 NOTE — FLOWSHEET NOTE
[facility] Outpatient Physical Therapy   [address] [place] [address] [address]   Phone: [phone]   Fax: [phone]    Physical Therapy Daily Treatment Note      Date:  2022  Patient Name:  Drew Antonio    :  1960  MRN: [id number]  Physician: Birdie Sanchez MD      Insurance: Medicare ([address] [place] [address]) TRACK CAP  Medical Diagnosis:   G83.4 (ICD-10-CM) - Cauda equina syndrome ([address]  M48.062 (ICD-10-CM) - Lumbar stenosis with neurogenic claudication                   Rehab Codes: Difficulty walking (R26.2), Muscle weakness (generalized) M62. 81, Low back pain M54. 50  Onset date: 21             Next Dr's appt. : 22  Visit Count:                     Cancel/No Show: 0/0    Subjective:  Patient has no new complaints since last session. Patient denies falls since last session. Patient does report increase (B) shoulder pain secondary to putting a lot of pressure through her arms when walking/standing etc.   Pain:  [x] Yes  [] No Location: low back and B LEs, (B) shoulders Pain Rating: (0-10 scale)  5/10, 7/10   Pain altered Tx:  [] No  [] Yes  Action:  Comments:    Objective:  Modalities:   Precautions:  Exercises:  Exercise Reps/ Time Weight/ Level Comments Completed   Nustep 6-8'  3   X   Abdominal bracing 15x 5\" holds      PPT   15x   HEP    PPT with march  15x2   HEP    SLR mid range  10x   HEP    SL hip abduction          SL Hip extension      Pt reports she cannot lay prone    SL clam   10x2        Bridges   10x2    Pt c/o burning in BLEs R>L    Heel slides + TrA 10x      Hip ADD + TrA 15x 3\" holds             Seated          LAQ* 20x2 1#   X   Marching* 20x2 1#  X   Hip ABD/ADD* 20x 3\" Yellow TB  X   Glut sets* 20x 3\"             Standing       Mini Squats* 10x2   X   Repeated Sit to stands *  5x2    arms crossed over chest this date Gayathri-Mod A; increased assistance needed with inc fatigue.     Marching* 10x2   X   Hip ABD/extension* 10x  BUE support X          Eric step overs L  10x ea     light UE support  x   Step ups F/L   10x 6'   x   3 way hip   10x Yellow    x   Cone taps   10x    limited UE support encouraged  x   Standing hamstring curls  10x 1#   x   (B) tandem stance  30\"x3    Limited UE support encouraged x                                    Other:   1/26: (*) Provided for HEP this session. Specific Instructions for next treatment: hip/ core strengthening, functional strengthening and work on L foot clearance, progress to standing LE/core strengthening. Assessment: [] Progressing toward goals. [] No change. [] Other:    [x] Patient would continue to benefit from skilled physical therapy services in order to: address the above deficits as patients goal is to ambulate safely without an AD    1/31: Completed all exercises to tolerance with multiple jessica for postural awareness, core engagement, pacing and light UE support to challenge balance and LE strength and avoid exacerbating pain symptoms in shoulders. Patient continues to demonstrate quick fatigue levels with exercises resulting in multiple short sitting rest breaks throughout. Patient required SBA entire treatment with no LOB that could not be self corrected this date. STG: (to be met in 9 treatments)  1. ? Strength: of L LE to 4+/5 to improve functional strength to ease completion of transfers. 2. ? Function: Patient to complete TUG in <15\" with RW to decrease fall risk. 3. Patient to ambulate with RW with normal gait pattern. 4. Independent with Home Exercise Programs  5. Demonstrate Knowledge of fall prevention  LTG: (to be met in 18 treatments)  1. Patient to complete 5x sit to stand test in <20\" without UE support to improve functional strength for transfers. 2. Patient to ambulate without AD with adequate left foot clearance and no evidence of imbalance.                     Patient goals: to walk without the walker     Pt.  Education:  [x] Yes  [] No  [] Reviewed Prior HEP/Ed  Method of Education: [x] Verbal  [] Demo  [] Written  Comprehension of Education:  [x] Verbalizes understanding. [] Demonstrates understanding. [] Needs review. [] Demonstrates/verbalizes HEP/Ed previously given. Plan: [x] Continue per plan of care.    [] Other:      Treatment Charges: Mins Units   []  Modalities     [x]  Ther Exercise 45 3   []  Manual Therapy     []  Ther Activities     []  Aquatics     []  Neuromuscular     [] Vasocompression     [] Gait Training     [] Dry needling        [] 1 or 2 muscles        [] 3 or more muscles     []  Other     Total Treatment time 45 3     Time In:950am       Time Out: 1035am    Electronically signed by:  Eugenia Spivey PTA

## 2022-02-02 ENCOUNTER — HOSPITAL ENCOUNTER (OUTPATIENT)
Dept: PHYSICAL THERAPY | Age: 62
Setting detail: THERAPIES SERIES
Discharge: HOME | End: 2022-02-02
Payer: MEDICARE

## 2022-02-02 PROCEDURE — 97110 THERAPEUTIC EXERCISES: CPT

## 2022-02-02 PROCEDURE — 97530 THERAPEUTIC ACTIVITIES: CPT

## 2022-02-02 NOTE — FLOWSHEET NOTE
[facility] Outpatient Physical Therapy   [address]   Phone: [phone]   Fax: [phone]    Physical Therapy Daily Treatment Note      Date:  2022  Patient Name:  Catracho Eddy    :  1960  MRN: [id number]  Physician: Ratna Madison MD      Insurance: Medicare ([address] [place] [address]) TRACK CAP  Medical Diagnosis:   G83.4 (ICD-10-CM) - Cauda equina syndrome ([address]  M48.062 (ICD-10-CM) - Lumbar stenosis with neurogenic claudication                   Rehab Codes: Difficulty walking (R26.2), Muscle weakness (generalized) M62. 81, Low back pain M54. 50  Onset date: 21             Next Dr's appt. : 22  Visit Count:                     Cancel/No Show: 0/0    Subjective:  Patient has no new complaints since last session. She confirms with therapist that she is to complete HEP 3x/day. Pain:  [x] Yes  [] No Location: low back and B LEs, (B) shoulders Pain Rating: (0-10 scale) 6/10   Pain altered Tx:  [] No  [] Yes  Action:  Comments:    Objective:  Modalities:   Precautions:  Exercises:  Exercise Reps/ Time Weight/ Level Comments Completed   Nustep 6-8'  3      Abdominal bracing 15x 5\" holds      PPT   15x   HEP    PPT with march  15x2   HEP    SLR mid range  10x   HEP    SL hip abduction          SL Hip extension      Pt reports she cannot lay prone    SL clam   10x2        Bridges   10x2    Pt c/o burning in BLEs R>L    Heel slides + TrA 10x      Hip ADD + TrA 15x 3\" holds             Seated          LAQ* 20x2 1#      Marching* 20x2 1#     Hip ABD/ADD* 20x 3\" Yellow TB     Glut sets* 20x 3\"             Standing       Mini Squats* 10x2      Repeated Sit to stands * x10    arms crossed over chest this date Gayathri-Mod A; increased assistance needed with inc fatigue.  X   Marching* 10x2      Hip ABD/extension* 10x  BUE support           Eric step overs L  10x ea     light UE support F/L  x   Step ups F/L   10x 6'   x   3 way hip   10x Yellow       Cone taps F/diagonal   10x    limited UE support encouraged  x   Standing hamstring curls  10x 1#      (B) tandem stance  30\"x3    Limited UE support encouraged     ambulation cone weaves 3 cones   x3    Added 2/2/22 X    L heel to R shin   x10    AAROM X              Other:   1/26: (*) Provided for HEP this session. Specific Instructions for next treatment: hip/ core strengthening, functional strengthening and work on L foot clearance, progress to standing LE/core strengthening. Assessment: [] Progressing toward goals. Patient continues to ambulate with decreased left foot clearance. Treatment focus this date was on increasing L step height to improve foot clearance. Patient with limited stance time on L LE during R foot cone taps secondary to weakness. Patient requires Miryam when attempting to complete without UE support. Patient with some hip flexion compensation to complete lateral step overs with basil secondary to weakness. Added ambulation cone weaves and coordination activities this date to progress motor control and improve gait. [] No change. [] Other:    [x] Patient would continue to benefit from skilled physical therapy services in order to: address the above deficits as patients goal is to ambulate safely without an AD      STG: (to be met in 9 treatments)  1. ? Strength: of L LE to 4+/5 to improve functional strength to ease completion of transfers. 2. ? Function: Patient to complete TUG in <15\" with RW to decrease fall risk. 3. Patient to ambulate with RW with normal gait pattern. 4. Independent with Home Exercise Programs  5. Demonstrate Knowledge of fall prevention  LTG: (to be met in 18 treatments)  1. Patient to complete 5x sit to stand test in <20\" without UE support to improve functional strength for transfers. 2. Patient to ambulate without AD with adequate left foot clearance and no evidence of imbalance.                     Patient goals: to walk without the walker     Pt.  Education:  [x] Yes  [] No  [] Reviewed Prior HEP/Ed  Method of Education: [x] Verbal  [] Demo  [] Written  Comprehension of Education:  [x] Verbalizes understanding. [] Demonstrates understanding. [] Needs review. [] Demonstrates/verbalizes HEP/Ed previously given. Plan: [x] Continue per plan of care.    [] Other:      Treatment Charges: Mins Units   []  Modalities     [x]  Ther Exercise 23 2   []  Manual Therapy     [x]  Ther Activities 20 1   []  Aquatics     []  Neuromuscular     [] Vasocompression     [] Gait Training     [] Dry needling        [] 1 or 2 muscles        [] 3 or more muscles     []  Other     Total Treatment time 43 3     Time In:1102am       Time Out: 1145am    Electronically signed by:  Geoff Cintron PT

## 2022-02-07 ENCOUNTER — HOSPITAL ENCOUNTER (OUTPATIENT)
Dept: PHYSICAL THERAPY | Age: 62
Setting detail: THERAPIES SERIES
Discharge: HOME | End: 2022-02-07
Payer: MEDICARE

## 2022-02-07 PROCEDURE — 97110 THERAPEUTIC EXERCISES: CPT

## 2022-02-07 NOTE — FLOWSHEET NOTE
[facility] Outpatient Physical Therapy   [address]   Phone: [phone]   Fax: [phone]    Physical Therapy Daily Treatment Note      Date:  2022  Patient Name:  Drew Antonio    :  1960  MRN: [id number]  Physician: Birdie Sanchez MD      Insurance: Medicare ([address] [place] [address]) TRACK CAP  Medical Diagnosis:   G83.4 (ICD-10-CM) - Cauda equina syndrome ([address]  M48.062 (ICD-10-CM) - Lumbar stenosis with neurogenic claudication                   Rehab Codes: Difficulty walking (R26.2), Muscle weakness (generalized) M62. 81, Low back pain M54. 50  Onset date: 21             Next 's appt. : 22  Visit Count:                     Cancel/No Show: 0/0    Subjective:  Patient reports she was running behind today because it was harder for her to walk in drive way to car because of snow and afraid of slipping. Pain:  [x] Yes  [] No Location: low back and B LEs, (B) shoulders Pain Rating: (0-10 scale) 6/10   Pain altered Tx:  [] No  [] Yes  Action:  Comments:Patient arrived late started 10 minutes late this date. Objective:  Modalities:    Precautions:  Exercises:  Exercise Reps/ Time Weight/ Level Comments Completed   Nustep 6-8'  3      Abdominal bracing 15x 5\" holds      PPT   15x   HEP    PPT with march  15x2   HEP    SLR mid range  10x   HEP    SL hip abduction          SL Hip extension      Pt reports she cannot lay prone    SL clam   10x2        Bridges   10x2    Pt c/o burning in BLEs R>L    Heel slides + TrA 10x      Hip ADD + TrA 15x 3\" holds             Seated          LAQ* 20x2 1#      Marching* 20x2 1#     Hip ABD/ADD* 20x 3\" Yellow TB     Glut sets* 20x 3\"             Standing       Mini Squats* 10x2      Repeated Sit to stands * x10    arms crossed over chest this date Gayathri-Mod A; increased assistance needed with inc fatigue.     Marching* 10x2      Hip ABD/extension* 10x  BUE support           Eric step overs L  10x ea     light UE support F/L  x Step ups F/L   10x 6'   x   3 way hip   10x Yellow       Cone taps F/diagonal   10x    limited UE support encouraged  x   Standing hamstring curls  10x 1#      (B) tandem stance  30\"x3    Limited UE support encouraged     ambulation cone weaves 3 cones   x3    With AD RW for first set of 3   LBQC second set of 3   X    L heel to R shin   x10    AAROM X              Other:   1/26: (*) Provided for HEP this session. Specific Instructions for next treatment: hip/ core strengthening, functional strengthening and work on L foot clearance, progress to standing LE/core strengthening. Assessment: [] Progressing toward goals. completed all exercises as time allowed, patient hesitant to progress from walker to large base quad cane de to fear of falling but was able to achieve step to gait pattern for cone ambulation with CGA this date and no LOB. [] No change. [] Other:    [x] Patient would continue to benefit from skilled physical therapy services in order to: address the above deficits as patients goal is to ambulate safely without an AD      STG: (to be met in 9 treatments)  1. ? Strength: of L LE to 4+/5 to improve functional strength to ease completion of transfers. 2. ? Function: Patient to complete TUG in <15\" with RW to decrease fall risk. 3. Patient to ambulate with RW with normal gait pattern. 4. Independent with Home Exercise Programs  5. Demonstrate Knowledge of fall prevention  LTG: (to be met in 18 treatments)  1. Patient to complete 5x sit to stand test in <20\" without UE support to improve functional strength for transfers. 2. Patient to ambulate without AD with adequate left foot clearance and no evidence of imbalance.                     Patient goals: to walk without the walker     Pt. Education:  [x] Yes  [] No  [] Reviewed Prior HEP/Ed  Method of Education: [x] Verbal  [] Demo  [] Written  Comprehension of Education:  [x] Verbalizes understanding.   [] Demonstrates understanding. [] Needs review. [] Demonstrates/verbalizes HEP/Ed previously given. Plan: [x] Continue per plan of care.    [] Other:      Treatment Charges: Mins Units   []  Modalities     [x]  Ther Exercise 35 2   []  Manual Therapy     []  Ther Activities     []  Aquatics     []  Neuromuscular     [] Vasocompression     [] Gait Training     [] Dry needling        [] 1 or 2 muscles        [] 3 or more muscles     []  Other     Total Treatment time 35 2     Time In:955am       Time Out: 1030am    Electronically signed by:  Ubaldo Vargas, PTA

## 2022-02-09 ENCOUNTER — HOSPITAL ENCOUNTER (OUTPATIENT)
Dept: PHYSICAL THERAPY | Age: 62
Setting detail: THERAPIES SERIES
Discharge: HOME | End: 2022-02-09
Payer: MEDICARE

## 2022-02-09 PROCEDURE — 97110 THERAPEUTIC EXERCISES: CPT

## 2022-02-09 NOTE — FLOWSHEET NOTE
509 [facility]   [address]   Phone: [phone]   Fax: [phone]    Physical Therapy Daily Treatment Note      Date:  2022  Patient Name:  Tona Hardin    :  1960  MRN: [id number]  Physician: Naomie Fritz MD      Insurance: Medicare ([address] [place] [address]) TRACK CAP  Medical Diagnosis:   G83.4 (ICD-10-CM) - Cauda equina syndrome ([address]  M48.062 (ICD-10-CM) - Lumbar stenosis with neurogenic claudication                   Rehab Codes: Difficulty walking (R26.2), Muscle weakness (generalized) M62. 81, Low back pain M54. 50  Onset date: 21             Next 's appt. : 22  Visit Count:                     Cancel/No Show: 0/0    Subjective:  Patient arrives noting 'normal pain' in B LE's and LB. Pain:  [x] Yes  [] No Location: low back and B LEs, (B) shoulders Pain Rating: (0-10 scale) 5/10   Pain altered Tx:  [] No  [] Yes  Action:  Comments: Ambulates with rolling walker demonstrating a fwd head posture. Objective:  Modalities:    Precautions:  Exercises:  Exercise Reps/ Time Weight/ Level Comments Completed   Nustep 6'  3   x   Abdominal bracing 15x 5\" holds      PPT   15x   HEP    PPT with march  15x2   HEP    SLR mid range  10x   HEP    SL hip abduction          SL Hip extension      Pt reports she cannot lay prone    SL clam   10x2        Bridges   10x2    Pt c/o burning in BLEs R>L    Heel slides + TrA 10x      Hip ADD + TrA 15x 3\" holds             Seated          LAQ* 20x2 1#      Marching* 20x2 1#     Hip ABD/ADD* 20x 3\" Yellow TB     Glut sets* 20x 3\"             Standing       Mini Squats* 10x2      Repeated Sit to stands * x10    arms crossed over chest this date Gayathri-Mod A; increased assistance needed with inc fatigue.     Marching* 10x2      Hip ABD/extension* 10x  BUE support           Seated hs stretch  3x20\" ea 6\" step Added 2/9 x   Seated hs curls  10x ea yellow Added 2/9 x   NBOS on foam 3x20\"  Added 2/9 x   Eric step overs L  10x ea     light UE support F/L  x   Step ups F/L   10x ea 6'  bilat fwd only 2/9/22 x   3 way hip   10x Yellow       Cone taps F/diagonal   10x    limited UE support encouraged     Standing hamstring curls  10x 1#      (B) tandem stance  30\"x3    Limited UE support encouraged x    ambulation cone weaves 4 cones   x3    With AD RW for first set of 3; step to gait pattern  [facility] second set of 3   X   Ambulation with [facility] 20'x2  CGA A; added 2/9 x       x    L heel to R shin   x10    AAROM               Other:   1/26: (*) Provided for HEP this session. Specific Instructions for next treatment: hip/ core strengthening, functional strengthening and work on L foot clearance, progress to standing LE/core strengthening. Assessment: [x] Progressing toward goals. Initiated session on nu step to improve B LE strength and endurance. Addition of seated hamstring stretch to reduce muscular tension. Incorporated NBOS on foam requiring cues for equal pressure b/w LE's and to control L ankle from rolling laterally. Improved control when stepping over basil with L LE and was able to complete all reps without knocking over basil; x1 instance of circumduction with fwd step over basil d/t fatigue which pt was able to self correct with remaining reps. Pt needing x2 seated rest breaks this date. Completed cone weaving with rolling walker first then with [facility] requiring cues for step to gait pattern to ensure stability. [] No change. [] Other:    [x] Patient would continue to benefit from skilled physical therapy services in order to: address the above deficits as patients goal is to ambulate safely without an AD      STG: (to be met in 9 treatments)  1. ? Strength: of L LE to 4+/5 to improve functional strength to ease completion of transfers. 2. ? Function: Patient to complete TUG in <15\" with RW to decrease fall risk. 3. Patient to ambulate with RW with normal gait pattern.    4. Independent with Home Exercise Programs  5. Demonstrate Knowledge of fall prevention  LTG: (to be met in 18 treatments)  1. Patient to complete 5x sit to stand test in <20\" without UE support to improve functional strength for transfers. 2. Patient to ambulate without AD with adequate left foot clearance and no evidence of imbalance.                     Patient goals: to walk without the walker     Pt. Education:  [x] Yes  [] No  [] Reviewed Prior HEP/Ed  Method of Education: [x] Verbal  [] Demo  [] Written  Comprehension of Education:  [x] Verbalizes understanding. [] Demonstrates understanding. [] Needs review. [] Demonstrates/verbalizes HEP/Ed previously given. Plan: [x] Continue per plan of care.    [] Other:      Treatment Charges: Mins Units   []  Modalities     [x]  Ther Exercise 46 3   []  Manual Therapy     []  Ther Activities     []  Aquatics     []  Neuromuscular     [] Vasocompression     [] Gait Training     [] Dry needling        [] 1 or 2 muscles        [] 3 or more muscles     []  Other     Total Treatment time 46 3     Time In 10:02 am       Time Out: 6050 am    Electronically signed by:  Angelique Sarmiento PTA

## 2022-02-11 ENCOUNTER — HOSPITAL ENCOUNTER (OUTPATIENT)
Dept: PHYSICAL THERAPY | Age: 62
Setting detail: THERAPIES SERIES
Discharge: HOME | End: 2022-02-11
Payer: MEDICARE

## 2022-02-11 PROCEDURE — 97530 THERAPEUTIC ACTIVITIES: CPT

## 2022-02-11 PROCEDURE — 97110 THERAPEUTIC EXERCISES: CPT

## 2022-02-11 NOTE — FLOWSHEET NOTE
[facility]   [address]   Phone: [phone]   Fax: [phone]    Physical Therapy Daily Treatment Note      Date:  2022  Patient Name:  Abebe Dash    :  1960  MRN: [id number]  Physician: Jori Bahena MD      Insurance: Medicare ([address] [place] [address]) TRACK CAP  Medical Diagnosis:   G83.4 (ICD-10-CM) - Cauda equina syndrome ([address]  M48.062 (ICD-10-CM) - Lumbar stenosis with neurogenic claudication                   Rehab Codes: Difficulty walking (R26.2), Muscle weakness (generalized) M62. 81, Low back pain M54. 50  Onset date: 21             Next Dr's appt. : 22  Visit Count:                     Cancel/No Show: 0/0    Subjective:  Patient reports today with no new issues. Reports \"usual\" pain in areas noted below that are constant. Pain:  [x] Yes  [] No Location: low back and B LEs, (B) shoulders Pain Rating: (0-10 scale) 5/10   Pain altered Tx:  [] No  [] Yes  Action:  Comments: Ambulates with rolling walker demonstrating a fwd head posture. Objective:  Modalities:    Precautions:  Exercises:  Exercise Reps/ Time Weight/ Level Comments Completed   Nustep 6'  3   x   Abdominal bracing 15x 5\" holds      PPT   15x   HEP    PPT with march  15x2   HEP    SLR mid range  10x   HEP    SL hip abduction          SL Hip extension      Pt reports she cannot lay prone    SL clam   10x2        Bridges   10x2    Pt c/o burning in BLEs R>L    Heel slides + TrA 10x      Hip ADD + TrA 15x 3\" holds             Seated          LAQ* 20x2 1#      Marching* 20x2 1#     Hip ABD/ADD* 20x 3\" Yellow TB     Glut sets* 20x 3\"             Standing       Mini Squats* 10x2      Repeated Sit to stands * x10    arms crossed over chest this date Gayathri-Mod A; increased assistance needed with inc fatigue.     Marching* 10x2      Hip ABD/extension* 10x  BUE support           Seated hs stretch  3x20\" ea 6\" step Added 2 x   Seated hs curls  10x ea yellow Added  NBOS on foam 3x20\"  Added 2/9 x   Basil step overs L  10x ea     light UE support F/L  x   Step ups F/L   10x ea 6'  bilat fwd only 2/9/22 x   3 way hip   10x Yellow    x   Cone taps F/diagonal   10x    limited UE support encouraged     Standing hamstring curls  10x 1#      (B) tandem stance  30\"x3   Foam Limited UE support encouraged x    ambulation cone weaves 4 cones   x3    With AD RW for first set of 3   x   Ambulation with [facility] 20'x2  CGA A; added 2/9            L heel to R shin   x10    AAROM               Other:   1/26: (*) Provided for HEP this session. Specific Instructions for next treatment: hip/ core strengthening, functional strengthening and work on L foot clearance, progress to standing LE/core strengthening. Assessment: [x] Progressing toward goals. Continued to work on LLE strength and neuromuscular control. Cues provided with basil exercise to avoid LLE hip circumduction with patient demonstrating improvement with cues and slower pace with exercise to control motion. Cues provided with cone weaves to avoid stopping to turn walker with more fluid motion demonstrated following. Notable increased fatigue by end of session today. [] No change. [] Other:    [x] Patient would continue to benefit from skilled physical therapy services in order to: address the above deficits as patients goal is to ambulate safely without an AD      STG: (to be met in 9 treatments)  1. ? Strength: of L LE to 4+/5 to improve functional strength to ease completion of transfers. 2. ? Function: Patient to complete TUG in <15\" with RW to decrease fall risk. 3. Patient to ambulate with RW with normal gait pattern. 4. Independent with Home Exercise Programs  5. Demonstrate Knowledge of fall prevention  LTG: (to be met in 18 treatments)  1. Patient to complete 5x sit to stand test in <20\" without UE support to improve functional strength for transfers.    2. Patient to ambulate without AD with adequate left foot clearance and no evidence of imbalance.                     Patient goals: to walk without the walker     Pt. Education:  [x] Yes  [] No  [] Reviewed Prior HEP/Ed  Method of Education: [x] Verbal  [] Demo  [] Written  Comprehension of Education:  [x] Verbalizes understanding. [] Demonstrates understanding. [] Needs review. [] Demonstrates/verbalizes HEP/Ed previously given. Plan: [x] Continue per plan of care.    [] Other:      Treatment Charges: Mins Units   []  Modalities     [x]  Ther Exercise 24 2   []  Manual Therapy     [x]  Ther Activities 20 1   []  Aquatics     []  Neuromuscular     [] Vasocompression     [] Gait Training     [] Dry needling        [] 1 or 2 muscles        [] 3 or more muscles     []  Other     Total Treatment time 44 3     Time In 10:36 am       Time Out: 11:20 am     Electronically signed by:  Chet Benz PTA

## 2022-02-14 ENCOUNTER — HOSPITAL ENCOUNTER (OUTPATIENT)
Dept: PHYSICAL THERAPY | Age: 62
Setting detail: THERAPIES SERIES
Discharge: HOME | End: 2022-02-14
Payer: MEDICARE

## 2022-02-14 NOTE — PROGRESS NOTES
[] [facility]) - [facility]  [address]  [place]: [phone]   F: [phone]     Physical Therapy Cancel/No Show note    Date: 2022  Patient: Kristina Cabrera  : 1960  MRN: [id number]    Visit Count:  Cancels/No Shows to date:     For today's appointment patient:    [x]  Cancelled    [] Rescheduled appointment    [] No-show     Reason given by patient:    []  Patient ill    [x]  Conflicting appointment    [] No transportation      [] Conflict with work    [] No reason given    [] Weather related    [] COVID-19    [] Other:      Comments:        [] Next appointment was confirmed    Electronically signed by: Phillip Hairston PTA

## 2022-02-16 ENCOUNTER — HOSPITAL ENCOUNTER (OUTPATIENT)
Dept: PHYSICAL THERAPY | Age: 62
Setting detail: THERAPIES SERIES
Discharge: HOME | End: 2022-02-16
Payer: MEDICARE

## 2022-02-16 PROCEDURE — 97110 THERAPEUTIC EXERCISES: CPT

## 2022-02-16 PROCEDURE — 97530 THERAPEUTIC ACTIVITIES: CPT

## 2022-02-16 NOTE — FLOWSHEET NOTE
arms crossed over chest this date Gayathri-Mod A; increased assistance needed with inc fatigue. Marching* 10x2   x   Hip ABD/extension* 10x  BUE support    Toe raises 15x  Added 2/16 x   Seated hs stretch  3x20\" ea 6\" step  x   Seated hs curls  10x ea yellow Added 2/9    NBOS on foam 3x20\"   x   Eric step overs L  10x ea     light UE support F/L     Step ups F/L   10x ea 6'   x   3 way hip   15x Yellow    x   Cone taps F/diagonal   10x    limited UE support encouraged     Standing hamstring curls  10x 1#      (B) tandem stance  30\"x3   Foam Limited UE support encouraged x    ambulation cone weaves 4 cones   x3    With AD RW  x   Ambulation with [facility] 20'x2  CGA A; added 2/9            L heel to R shin   x10    AAROM               Other:    Specific Instructions for next treatment: hip/ core strengthening, functional strengthening and work on L foot clearance, progress to standing LE/core strengthening. Assessment: [x] Progressing toward goals. Continued to work on LLE strength and neuromuscular control. Patient required CGA during stepping exercises with min A required for minor LOB at parallel bars. Cues given during gait training to improve heel strike. Patient able to step slowly and demonstrate heel strike 50% of the time. Short seated rest breaks given due to increased weakness during standing exercises. Increased fatigue was noted at end of session with pain still at 5/10. [] No change. [] Other:    [x] Patient would continue to benefit from skilled physical therapy services in order to: address the above deficits as patients goal is to ambulate safely without an AD      STG: (to be met in 9 treatments)  1. ? Strength: of L LE to 4+/5 to improve functional strength to ease completion of transfers. 2. ? Function: Patient to complete TUG in <15\" with RW to decrease fall risk. 3. Patient to ambulate with RW with normal gait pattern.    4. Independent with Home Exercise

## 2022-02-18 ENCOUNTER — HOSPITAL ENCOUNTER (OUTPATIENT)
Dept: PHYSICAL THERAPY | Age: 62
Setting detail: THERAPIES SERIES
Discharge: HOME | End: 2022-02-18
Payer: MEDICARE

## 2022-02-18 PROCEDURE — 97140 MANUAL THERAPY 1/> REGIONS: CPT

## 2022-02-18 PROCEDURE — 97110 THERAPEUTIC EXERCISES: CPT

## 2022-02-18 NOTE — PROGRESS NOTES
509 [facility]  [address] [place] [address]. [address]         Phone: [phone]       Fax: [phone]    Physical Therapy Progress Note    Date: 2022      Patient: Catracho Eddy  : 1960  MRN: [id number]    Physician: Ratna Madison MD      Insurance: Medicare [facility] CAP  Medical Diagnosis:   G83.4 (ICD-10-CM) - Cauda equina syndrome ([address]  M48.062 (ICD-10-CM) - Lumbar stenosis with neurogenic claudication           Rehab Codes: Difficulty walking (R26.2), Muscle weakness (generalized) M62. 81, Low back pain M54. 50  Onset date: 21             Next Dr's appt. : 22  Visit Count:  (18 extended to 25 visits 22)                   Cancel/No Show: 0/0  Date of initial visit: 22                Subjective:  Patient reports her left leg doesn't want to work right. She feels that she started off strong and now she feels weaker. She states that on  night her left leg went completely numb and she couldn't move it. Patient says that it went away after about 30 minutes. She reports this hasn't happened since. Patient reports no falls this calendar year. She sees her neurosurgeon for a follow up on Monday. Pain:  [x] Yes  [] No  Location: low back Pain Rating: (0-10 scale) 5/10 (patient states she lives with 5/10 chronic back pain). Pain altered Tx:  [] No  [] Yes  Action:  Comments:    Objective:  Test Measurements:   5x sit to stand test : 36\" with UE support- unable to come to stand without UE support. TU\", 26\" with rolling walker- no change from evaluation. MMT to B LEs:  Left hip flexion 3/5, left hip abduction 3+/5, L hip extension 2-/5, L knee extension 4/5, L knee flexion 4/5, ankle DF/InV/Ev 4-/5  Gait: Patient continues to ambulate with decreased left foot clearance and decreased knee flexion in swing phase.  L LE continues to present with ataxic movements with decreased proprioception and lacks motor control in swing phase with frequent ankle inversion and lack of heel strike observed in stance phase. Left upper gluteal and piriformis tightness with moderate TTP. Foam roll and manual piriformis stretching applied to decrease tightness and pain. Assessment: Patient has made minimal progress towards goals at this time. She continues to have an ataxic gait pattern with decreased foot clearance on left and absences of heel strike. Patient exhibits significant L LE weakness that is limiting gait and overall mobility. TUG score remains the same as evaluation score. 5x sit to stand test completed this date with UE support as patient is unable to complete when attempting without UEs. Patient has concerns that her function is now less than when she started. She lacks proprioceptive awareness in L foot during gait. I trial ambulation with thera-band wrapped in figure 8 on L foot/ankle to assist in foot clearance. This did not improve foot clearance but did give her L foot more control. Patient may benefit from laced ankle brace to provide the proprioceptive feedback. I will be addressing this next session. Patient will see the neurosurgeon on Monday and has many questions and concerns for him. I agree with patient in that she was progressing quite well in the beginning of her rehabilitation and is now facing a plateau/near decline. Treatment has focused on improving motor control and use of PNF patterns in standing with functional activities. In future visits I will be incorporating more supine PNF with heavy focus on each component and adding resistance. Extending POC from 18 to 25 visits to address remaining deficits. STG: (to be met in 9 treatments)  1. ? Strength: of L LE to 4+/5 to improve functional strength to ease completion of transfers. 2. ? Function: Patient to complete TUG in <15\" with RW to decrease fall risk.    3. Patient to ambulate with RW with normal gait pattern.   4. Independent with Home Exercise Programs MET   5. Demonstrate Knowledge of fall prevention MET  LTG: (to be met in 18 treatments)  1. Patient to complete 5x sit to stand test in <20\" without UE support to improve functional strength for transfers. 2. Patient to ambulate without AD with adequate left foot clearance and no evidence of imbalance.          Treatment to Date:  [x] Therapeutic Exercise   56360  [] Iontophoresis: 4 mg/mL Dexamethasone Sodium Phosphate  mAmin  06773   [x] Therapeutic Activity  18524 [] Vasopneumatic cold with compression  75252    [x] Gait Training   81584 [] Ultrasound   54785   [x] Neuromuscular Re-education  15933 [] Electrical Stimulation Unattended  85291   [] Manual Therapy  99408 [] Electrical Stimulation Attended  88869   [x] Instruction in HEP  [] Lumbar/Cervical Traction  83797   [] Aquatic Therapy   52389 [] Cold/hotpack    [] Massage   10886      [] Dry Needling, 1 or 2 muscles  60025   [] Biofeedback, first 15 minutes   91299  [] Biofeedback, additional 15 minutes   01564 [] Dry Needling, 3 or more muscles  94314      Patient Status:     [x] Continue per initial plan of care. [x] Additional visits necessary, extending POC from 18 to 25 visits. Continue 3x/week. [] Other:     Requested Frequency/Duration: 3 times per week for 14 more visits (total of 25 visits). Treatment Charges: Mins Units   []? Modalities       [x]? Ther Exercise 35 2   [x]? Manual Therapy  10  1   []? Ther Activities     []? Aquatics       []? Neuromuscular       []? Vasocompression       []? Gait Training       []? Dry needling        []? 1 or 2 muscles        []? 3 or more muscles       []? Other       Total Treatment time 45 3     Time In: 11:20 am       Time Out:12:05pm     Electronically signed by: Ryan Ledbetter PT    If you have any questions or concerns, please don't hesitate to call.   Thank you for your referral.    Physician Signature:________________________________Date:__________________  By signing above or cosigning this note, I have reviewed this plan of care and certify a need for medically necessary rehabilitation services.      *PLEASE SIGN ABOVE AND FAX BACK ALL PAGES*

## 2022-02-21 ENCOUNTER — OFFICE VISIT (OUTPATIENT)
Dept: NEUROSURGERY | Age: 62
End: 2022-02-21
Payer: MEDICARE

## 2022-02-21 ENCOUNTER — HOSPITAL ENCOUNTER (OUTPATIENT)
Dept: PHYSICAL THERAPY | Age: 62
Setting detail: THERAPIES SERIES
Discharge: HOME | End: 2022-02-21
Payer: MEDICARE

## 2022-02-21 VITALS
HEIGHT: 61 IN | SYSTOLIC BLOOD PRESSURE: 138 MMHG | DIASTOLIC BLOOD PRESSURE: 72 MMHG | BODY MASS INDEX: 26.81 KG/M2 | OXYGEN SATURATION: 98 % | HEART RATE: 98 BPM | TEMPERATURE: 98.1 F | WEIGHT: 142 LBS

## 2022-02-21 DIAGNOSIS — Q76.49 SPINAL DEFORMITY: Primary | ICD-10-CM

## 2022-02-21 DIAGNOSIS — G99.2 STENOSIS OF CERVICAL SPINE WITH MYELOPATHY (HCC): ICD-10-CM

## 2022-02-21 DIAGNOSIS — M53.2X2 CERVICAL SPINE INSTABILITY: ICD-10-CM

## 2022-02-21 DIAGNOSIS — M48.02 STENOSIS OF CERVICAL SPINE WITH MYELOPATHY (HCC): ICD-10-CM

## 2022-02-21 PROCEDURE — G8419 CALC BMI OUT NRM PARAM NOF/U: HCPCS | Performed by: NEUROLOGICAL SURGERY

## 2022-02-21 PROCEDURE — 1036F TOBACCO NON-USER: CPT | Performed by: NEUROLOGICAL SURGERY

## 2022-02-21 PROCEDURE — 99214 OFFICE O/P EST MOD 30 MIN: CPT | Performed by: NEUROLOGICAL SURGERY

## 2022-02-21 PROCEDURE — 97112 NEUROMUSCULAR REEDUCATION: CPT

## 2022-02-21 PROCEDURE — G8482 FLU IMMUNIZE ORDER/ADMIN: HCPCS | Performed by: NEUROLOGICAL SURGERY

## 2022-02-21 PROCEDURE — 97110 THERAPEUTIC EXERCISES: CPT

## 2022-02-21 PROCEDURE — 3017F COLORECTAL CA SCREEN DOC REV: CPT | Performed by: NEUROLOGICAL SURGERY

## 2022-02-21 PROCEDURE — G8427 DOCREV CUR MEDS BY ELIG CLIN: HCPCS | Performed by: NEUROLOGICAL SURGERY

## 2022-02-21 NOTE — FLOWSHEET NOTE
509 [facility]   [address]   Phone: [phone]   Fax: [phone]    Physical Therapy Daily Treatment Note      Date:  2022  Patient Name:  Tona Hardin    :  1960  MRN: [id number]  Physician: Naomie Fritz MD      Insurance: Medicare ([address] [place] [address]) TRACK CAP  Medical Diagnosis:   G83.4 (ICD-10-CM) - Cauda equina syndrome ([address]  M48.062 (ICD-10-CM) - Lumbar stenosis with neurogenic claudication                   Rehab Codes: Difficulty walking (R26.2), Muscle weakness (generalized) M62. 81, Low back pain M54. 50  Onset date: 21             Next 's appt. : 22  Visit Count:                     Cancel/No Show: 0/0     Subjective:  Patient reports she is feeling like she is losing more control of her L LE and L hand. She states she saw her neurosurgeon today and he ordered a CT, X-ray, and MRI and discussed possible cervical surgery after completing the imaging. Pain:  [x] Yes  [] No Location: low back and B LEs  Pain Rating: (0-10 scale) 2/10   Pain altered Tx:  [x] No  [] Yes  Action:  Comments: Ambulates with rolling walker demonstrating a fwd head posture. Objective:  Modalities:    Precautions:  Exercises:  Exercise Reps/ Time Weight/ Level Comments Completed   Nustep 6'  3   x   Abdominal bracing 15x 5\" holds      PPT   15x   HEP    PPT with march  15x2   HEP    SLR mid range  10x   HEP    SL hip abduction          SL Hip extension      Pt reports she cannot lay prone    SL clam   10x2        Bridges   10x2        Heel slides + TrA 10x      Hip ADD + TrA 15x 3\" holds             Seated          LAQ* 20x2 1#      Marching* 20x2 1#     Hip ABD/ADD* 20x 3\" Yellow TB     Glut sets* 20x 3\"             Standing       Mini Squats* 15x2   x   Repeated Sit to stands * x10    arms crossed over chest this date Gayathri-Mod A; increased assistance needed with inc fatigue.     Marching* 15x2   x   Hip ABD/extension* 10x  BUE support Toe raises 2 x 10  Added sets 2/21 x   Seated hs stretch  3x30\" ea 6\" step  x   Seated hs curls  10x ea yellow Added 2/9    NBOS on foam 3x30\"  Increased time 2/21 x   Eric step overs L  10x ea     light UE support F/L     Step ups F/L   10x ea 6'   x   3 way hip   15x Yellow       Cone taps F/diagonal   10x    limited UE support encouraged  x   Standing hamstring curls  15x 1#   x   (B) tandem stance  30\"x3   Foam Limited UE support encouraged x    ambulation cone weaves 4 cones   x3    With AD RW  x   Ambulation with [facility] 20'x2  CGA A; added 2/9            L heel to R shin   x10    AAROM     Ambulation with long mirror 50ft x2     x   Other:    Specific Instructions for next treatment: hip/ core strengthening, functional strengthening and work on L foot clearance, progress to standing LE/core strengthening. Assessment: [x] Progressing toward goals. At beginning of session, patient was demonstrating very little control of her L LE with an increase in spasms. During exercises at the parallel bars, patient was able to improve motor control with verbal and visual cues. Added reps to marching, mini squats, and amount of time during balance activities. During cone weave, patient was unable to perform heel strike and at times would drag her toes. Added the long mirror to assist with foot placement awareness and patient was able to clear L foot during gait cycle. Patient responded well to the progression today, demonstrating improved motor control of LLE by end of session. [] No change. [] Other:    [x] Patient would continue to benefit from skilled physical therapy services in order to: address the above deficits as patients goal is to ambulate safely without an AD      STG: (to be met in 9 treatments)  1. ? Strength: of L LE to 4+/5 to improve functional strength to ease completion of transfers. 2. ? Function: Patient to complete TUG in <15\" with RW to decrease fall risk.    3. Patient to ambulate with RW with normal gait pattern.   4. Independent with Home Exercise Programs MET per progress note 2/18  5. Demonstrate Knowledge of fall prevention MET per progress note 2/18    LTG: (to be met in 18 treatments)  1. Patient to complete 5x sit to stand test in <20\" without UE support to improve functional strength for transfers. 2. Patient to ambulate without AD with adequate left foot clearance and no evidence of imbalance.                     Patient goals: to walk without the walker     Pt. Education:  [x] Yes  [] No  [] Reviewed Prior HEP/Ed  Method of Education: [x] Verbal  [] Demo  [] Written  Comprehension of Education:  [x] Verbalizes understanding. [] Demonstrates understanding. [] Needs review. [] Demonstrates/verbalizes HEP/Ed previously given. Plan: [x] Continue per plan of care.    [] Other:      Treatment Charges: Mins Units   []  Modalities     [x]  Ther Exercise 25 2   []  Manual Therapy     []  Ther Activities     []  Aquatics     [x]  Neuromuscular 15 1   [] Vasocompression     [] Gait Training     [] Dry needling        [] 1 or 2 muscles        [] 3 or more muscles     []  Other     Total Treatment time 40 3     Time In: 3:40 pm       Time Out: 4:20 pm     Electronically signed by:  Shanon Dill PTA

## 2022-02-21 NOTE — PROGRESS NOTES
[address]  [facility] [place], [place] [address], [address] [facility] [address]  Dept: [phone]    Patient:  Melanie Abarca  YOB: 1960  Date: 2/21/22    The patient is a 62 y.o. female who presents today for consult of the following problems:     Chief Complaint   Patient presents with    Post-Op Check             HPI:     Melanie Abarca is a 62 y.o. female on whom neurosurgical consultation was requested by Anthony Taylor MD for management of cervical stenosis. The patient states that she is had improvement overall in her balance and ability to ambulate but still has significant weakness and sensory abnormalities of the left leg. No bowel or bladder incontinence or retention of any kind. Has been able to ambulate with a walker. No saddle anesthesia. Has not had any new falls. States that she still gets a significant moderate cramping and weakness in both of her hands with the left being greater than the right and this has been progressive. Yanira Elizabeth       History:     Past Medical History:   Diagnosis Date    Anxiety     Arthritis     At maximum risk for fall 12/29/2021    caudal equina syndrome and cervical stenosis    Cancer ([address]     right breast    Cauda equina syndrome ([address] 12/29/2021    neuropathy, mobility difficulty, incontinance    Cervical stenosis of spine 12/29/2021    GERD (gastroesophageal reflux disease)     History of stomach ulcers     Hypertension     Hypothyroidism     Lumbar neuritis 9/8/2016    Post laminectomy syndrome 9/8/2016    Spinal deformity 11/2021    cervical and lumbar    Thyroid disease      Past Surgical History:   Procedure Laterality Date    BACK SURGERY      x 2    BREAST SURGERY Right     mastectomy with reconstruction    CERVICAL LAMINECTOMY  11/04/2014    cervicle laminectomy c4-c6    HERNIA REPAIR Bilateral     inguinal    HYSTERECTOMY, TOTAL Facial Swelling       Review of Systems  ROS: weakness, numbness    Physical Exam:      BP (!) 154/75   Pulse 98   Temp 98.1 °F (36.7 °C) (Temporal)   Ht 5' 1\" (1.549 m)   Wt 142 lb (64.4 kg)   SpO2 98%   BMI 26.83 kg/m²   Estimated body mass index is 26.83 kg/m² as calculated from the following:    Height as of this encounter: 5' 1\" (1.549 m). Weight as of this encounter: 142 lb (64.4 kg). General:  Ev Heard is a 62y.o. year old female who appears her stated age. HEENT: Normocephalic atraumatic. Neck supple. Chest: regular rate; pulses equal. Equal chest rise and fall  Abdomen: Soft nondistended. Ext: DP equal with good capillary refill  Neuro    Mentation  Appropriate affect   oriented    Cranial Nerves:   Pupils equal and reactive to light  Extraocular motion intact  Face symmetric  No dysarthria  v1-3 sensation symmetric, masseter tone symmetric  Hearing symmetric and intact to finger rub    Sensation:   Diminished UE b/l and LLE    Motor  L deltoid 5/5; R deltoid 5/5  L biceps 5/5; R biceps 5/5  L triceps 5/5; R triceps 5/5  L wrist extension 5/5; R wrist extension 5/5  L intrinsics 5/5; R intrinsics 5/5     RLE df eversion and inversion 4+, pf 5, quad/iliop 5  LLE df eversion and inversion 4-, pf 4, quad iliop5    Reflexes  Diffuse 3/4. +munoz, clonus      Studies Review:     Mri c myelomalacia with C3/4    Assessment and Plan:      1. Spinal deformity    2. 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 Cervical spine instability          Plan:     Surgical evaluation for C spine with stenosis, deformity and myelopathy. Consideration with multiplanar abnormality    Ct cervical noncontrast to evaluate for autofusion, residual bone of facets  Scoliosis xrays  Cervical flex ext    Followup: No follow-ups on file. Prescriptions Ordered:  No orders of the defined types were placed in this encounter.        Orders Placed:  Orders Placed This Encounter   Procedures    XR SPINE ENTIRE (2-3 VIEWS)     Standing Status:   Future     Standing Expiration Date:   2/21/2023     Scheduling Instructions:      STANDING -- AP and Lateral; Include C2 to Pelvis & both femoral heads     Order Specific Question:   Reason for exam:     Answer:   scoliosis    XR CERVICAL SPINE FLEXION AND EXTENSION     Standing Status:   Future     Standing Expiration Date:   2/21/2023     Order Specific Question:   Reason for exam:     Answer:   eval instability    CT CERVICAL SPINE WO CONTRAST     Standing Status:   Future     Standing Expiration Date:   5/22/2022     Scheduling Instructions:      Post myelogram CT     Order Specific Question:   Reason for exam:     Answer:   eval autofusion        Electronically signed by Beau Skaggs DO on 2/21/2022 at 10:52 AM    Please note that this chart was generated using voice recognition Dragon dictation software. Although every effort was made to ensure the accuracy of this automated transcription, some errors in transcription may have occurred.

## 2022-02-23 ENCOUNTER — HOSPITAL ENCOUNTER (OUTPATIENT)
Dept: PHYSICAL THERAPY | Age: 62
Setting detail: THERAPIES SERIES
Discharge: HOME | End: 2022-02-23
Payer: MEDICARE

## 2022-02-23 PROCEDURE — 97110 THERAPEUTIC EXERCISES: CPT

## 2022-02-23 PROCEDURE — 97112 NEUROMUSCULAR REEDUCATION: CPT

## 2022-02-23 NOTE — FLOWSHEET NOTE
[address] [facility]   [address]   Phone: [phone]   Fax: [phone]    Physical Therapy Daily Treatment Note      Date:  2022  Patient Name:  Lata Gramajo    :  1960  MRN: [id number]  Physician: Marco A De La Cruz MD      Insurance: Medicare ([address] [place] [address]) TRACK CAP  Medical Diagnosis:   G83.4 (ICD-10-CM) - Cauda equina syndrome ([address]  M48.062 (ICD-10-CM) - Lumbar stenosis with neurogenic claudication                   Rehab Codes: Difficulty walking (R26.2), Muscle weakness (generalized) M62. 81, Low back pain M54. 50  Onset date: 21             Next Dr's appt. : 22  Visit Count:                     Cancel/No Show: 0/0     Subjective:  Patient arrives complaining of her L foot dragging more and she is having more difficulty with feeling steady while walking. Pain:  [x] Yes  [] No Location: low back and B LEs  Pain Rating: (0-10 scale) 3/10   Pain altered Tx:  [x] No  [] Yes  Action:  Comments: Ambulates with rolling walker demonstrating a fwd head posture. Objective:  Modalities:    Precautions:  Exercises:  Exercise Reps/ Time Weight/ Level Comments Completed   Nustep 6'  3   x   Abdominal bracing 15x 5\" holds      PPT   15x   HEP    PPT with march  15x2   HEP    SLR mid range  10x   HEP    SL hip abduction          SL Hip extension      Pt reports she cannot lay prone    SL clam   10x2        Bridges   10x2        Heel slides + TrA 10x      Hip ADD + TrA 15x 3\" holds             Seated          LAQ* 20x2 1#      Marching* 20x2 1#     Hip ABD/ADD* 20x 3\" Yellow TB     Glut sets* 20x 3\"             Standing       Mini Squats* 15x2      Repeated Sit to stands * x10    arms crossed over chest this date Gayathri-Mod A; increased assistance needed with inc fatigue.     Marching* 15x2 2# Added wt  x   Hip ABD/extension* 10x  BUE support    Toe raises 2 x 10  Added sets  x   Seated hs stretch  3x30\" ea 6\" step  x   Seated hs curls  10x ea yellow Added 2/9    NBOS on foam 3x30\"      Eric step overs L  10x ea     light UE support F/L     Step ups F/L   10x ea 6'   x   3 way hip   15x Yellow       Cone taps F/diagonal   10x    limited UE support encouraged  x   Standing hamstring curls  15x 2#  increased wt 2/23 x   (B) tandem stance  30\"x3     x    ambulation cone weaves 4 cones   x3    With AD RW  x   Ambulation with [facility] 20'x2  CGA A; added 2/9            L heel to R shin   x10    AAROM     Ambulation with long mirror 50ft x4     x   Other:    Specific Instructions for next treatment: hip/ core strengthening, functional strengthening and work on L foot clearance, progress to standing LE/core strengthening. Assessment: [x] Progressing toward goals. Initiated therapy with nustep, followed by ambulation in front of mirror focusing on heel strike and foot placement while decreasing the demonstrated antalgic gait pattern. During standing exercises, L knee buckling was observed, requiring a few seated rest breaks. Two minor LOB in // bars noted, patient able to self correct without assistance. Held foam from all standing exercises due to the knee buckling and LOB. Reviewed HEP and importance of completing the strengthening exercises for improving stability. [] No change. [] Other:    [x] Patient would continue to benefit from skilled physical therapy services in order to: address the above deficits as patients goal is to ambulate safely without an AD      STG: (to be met in 9 treatments)  1. ? Strength: of L LE to 4+/5 to improve functional strength to ease completion of transfers. 2. ? Function: Patient to complete TUG in <15\" with RW to decrease fall risk. 3. Patient to ambulate with RW with normal gait pattern.   4. Independent with Home Exercise Programs MET per progress note 2/18  5. Demonstrate Knowledge of fall prevention MET per progress note 2/18    LTG: (to be met in 18 treatments)  1.  Patient to complete 5x sit to stand test in <20\" without UE support to improve functional strength for transfers. 2. Patient to ambulate without AD with adequate left foot clearance and no evidence of imbalance.                     Patient goals: to walk without the walker     Pt. Education:  [x] Yes  [] No  [] Reviewed Prior HEP/Ed  Method of Education: [x] Verbal  [] Demo  [] Written  Comprehension of Education:  [x] Verbalizes understanding. [] Demonstrates understanding. [] Needs review. [] Demonstrates/verbalizes HEP/Ed previously given. Plan: [x] Continue per plan of care.    [] Other:      Treatment Charges: Mins Units   []  Modalities     [x]  Ther Exercise 25 2   []  Manual Therapy     []  Ther Activities     []  Aquatics     [x]  Neuromuscular 15 1   [] Vasocompression     [] Gait Training     [] Dry needling        [] 1 or 2 muscles        [] 3 or more muscles     []  Other     Total Treatment time 40 3     Time In: 12:10 pm       Time Out: 12:50 pm     Electronically signed by:  Torrie Vicente PTA

## 2022-02-25 ENCOUNTER — HOSPITAL ENCOUNTER (OUTPATIENT)
Dept: PHYSICAL THERAPY | Age: 62
Setting detail: THERAPIES SERIES
Discharge: HOME | End: 2022-02-25
Payer: MEDICARE

## 2022-02-25 PROCEDURE — 97110 THERAPEUTIC EXERCISES: CPT

## 2022-02-25 PROCEDURE — 97116 GAIT TRAINING THERAPY: CPT

## 2022-02-25 NOTE — FLOWSHEET NOTE
[address] [facility] Out[facility] Physical Therapy   [address]   Phone: [phone]   Fax: [phone]    Physical Therapy Daily Treatment Note      Date:  2022  Patient Name:  Osvaldo Anthony    :  1960  MRN: [id number]  Physician: Daneille Fontenot MD      Insurance: Medicare ([address] [place] [address]) TRACK CAP  Medical Diagnosis:   G83.4 (ICD-10-CM) - Cauda equina syndrome ([address]  M48.062 (ICD-10-CM) - Lumbar stenosis with neurogenic claudication                   Rehab Codes: Difficulty walking (R26.2), Muscle weakness (generalized) M62. 81, Low back pain M54. 50  Onset date: 21             Next Dr's appt. : 22  Visit Count:                     Cancel/No Show: 0/0     Subjective:  Patient reports feeling well, but is experiencing a slight increase in pain to her low back. Patient reports numbness in her hands is getting worse. Her  had to tie her shoes for her this morning and that is not typical.     Pain:  [x] Yes  [] No Location: low back and B LEs  Pain Rating: (0-10 scale) 5/10   Pain altered Tx:  [x] No  [] Yes  Action:  Comments: Ambulates with rolling walker demonstrating a fwd head posture. Objective:  Modalities:    Precautions:  Exercises:  Exercise Reps/ Time Weight/ Level Comments Completed   Nustep 6'  4  increased level  x   Abdominal bracing 15x 5\" holds      PPT   15x   HEP    PPT with march  15x2   HEP    SLR mid range  10x   HEP    SL hip abduction          SL Hip extension      Pt reports she cannot lay prone    SL clam   10x2        Bridges   10x2        Heel slides + TrA 10x      Hip ADD + TrA 15x 3\" holds             Seated          LAQ* 20x2 1#      Marching* 20x2 1#     Hip ABD/ADD* 20x 3\" Yellow TB     Glut sets* 20x 3\"             Standing       Mini Squats* 15x2      Repeated Sit to stands * x10    arms crossed over chest this date Gayathri-Mod A; increased assistance needed with inc fatigue.     Marching* 15x2 2# Added wt  x   Hip ABD/extension* 10x  BUE support    Toe raises 2 x 10  Added sets 2/21 x   Seated hs stretch  3x30\" ea 6\" step     Seated hs curls  10x ea yellow Added 2/9    NBOS on foam 3x30\"   x   Eric step overs L  10x ea     light UE support F/L     Step ups F/L   10x ea 6'   x   3 way hip   15x Yellow       Cone taps F/diagonal   10x   Added crossing midline 2/25 x   Standing hamstring curls  15x 2#  increased wt 2/23 x   (B) tandem stance  30\"x3     x    ambulation cone weaves 4 cones   x3    With AD RW  x   Ambulation with [facility] 20'x2  CGA A; added 2/9 x           L heel to R shin   x10    AAROM     Ambulation with long mirror 50ft x4        Other:    Specific Instructions for next treatment: hip/ core strengthening, functional strengthening and work on L foot clearance, progress to standing LE/core strengthening. Assessment: [x] Progressing toward goals. Patient demonstrated improved foot clearance with ambulation and 2WW. Initiated treatment with nustep and increased level from 3 to 4 for improving endurance and strength. During cone tap exercise at parallel bars, added crossing midline with L LE to tap cone on R side. Patient was able to tap all cones 10x with miah UE support on // bars, without LOB. Encouraged patient to continue HEP as discussed previously to continue strengthening for further progress. [] No change. [] Other:    [x] Patient would continue to benefit from skilled physical therapy services in order to: address the above deficits as patients goal is to ambulate safely without an AD      STG: (to be met in 9 treatments)  1. ? Strength: of L LE to 4+/5 to improve functional strength to ease completion of transfers. 2. ? Function: Patient to complete TUG in <15\" with RW to decrease fall risk. 3. Patient to ambulate with RW with normal gait pattern.   4. Independent with Home Exercise Programs MET per progress note 2/18  5.  Demonstrate Knowledge of fall prevention MET per progress note 2/18    LTG: (to be met in 18 treatments)  1. Patient to complete 5x sit to stand test in <20\" without UE support to improve functional strength for transfers. 2. Patient to ambulate without AD with adequate left foot clearance and no evidence of imbalance.                     Patient goals: to walk without the walker     Pt. Education:  [x] Yes  [] No  [] Reviewed Prior HEP/Ed  Method of Education: [x] Verbal  [] Demo  [] Written  Comprehension of Education:  [x] Verbalizes understanding. [] Demonstrates understanding. [] Needs review. [] Demonstrates/verbalizes HEP/Ed previously given. Plan: [x] Continue per plan of care.    [] Other:      Treatment Charges: Mins Units   []  Modalities     [x]  Ther Exercise 30 2   []  Manual Therapy     []  Ther Activities     []  Aquatics     []  Neuromuscular     [] Vasocompression     [x] Gait Training 15 1   [] Dry needling        [] 1 or 2 muscles        [] 3 or more muscles     []  Other     Total Treatment time 45 3     Time In: 12:45 pm       Time Out: 1:30 pm     Electronically signed by:  [employer] PTA

## 2022-02-28 ENCOUNTER — HOSPITAL ENCOUNTER (OUTPATIENT)
Dept: PHYSICAL THERAPY | Age: 62
Setting detail: THERAPIES SERIES
Discharge: HOME | End: 2022-02-28
Payer: MEDICARE

## 2022-02-28 ENCOUNTER — HOSPITAL ENCOUNTER (OUTPATIENT)
Dept: GENERAL RADIOLOGY | Facility: CLINIC | Age: 62
Discharge: HOME | End: 2022-03-02
Payer: MEDICARE

## 2022-02-28 ENCOUNTER — HOSPITAL ENCOUNTER (OUTPATIENT)
Dept: CT IMAGING | Facility: CLINIC | Age: 62
Discharge: HOME | End: 2022-03-02
Payer: MEDICARE

## 2022-02-28 ENCOUNTER — APPOINTMENT (OUTPATIENT)
Dept: CT IMAGING | Age: 62
End: 2022-02-28
Payer: MEDICARE

## 2022-02-28 ENCOUNTER — HOSPITAL ENCOUNTER (OUTPATIENT)
Facility: CLINIC | Age: 62
Discharge: HOME | End: 2022-03-02
Payer: MEDICARE

## 2022-02-28 DIAGNOSIS — G99.2 STENOSIS OF CERVICAL SPINE WITH MYELOPATHY (HCC): ICD-10-CM

## 2022-02-28 DIAGNOSIS — Q76.49 SPINAL DEFORMITY: ICD-10-CM

## 2022-02-28 DIAGNOSIS — M48.02 STENOSIS OF CERVICAL SPINE WITH MYELOPATHY (HCC): ICD-10-CM

## 2022-02-28 DIAGNOSIS — M53.2X2 CERVICAL SPINE INSTABILITY: ICD-10-CM

## 2022-02-28 PROCEDURE — 97110 THERAPEUTIC EXERCISES: CPT

## 2022-02-28 PROCEDURE — 72125 CT NECK SPINE W/O DYE: CPT

## 2022-02-28 PROCEDURE — 72082 X-RAY EXAM ENTIRE SPI 2/3 VW: CPT

## 2022-02-28 PROCEDURE — 97116 GAIT TRAINING THERAPY: CPT

## 2022-02-28 PROCEDURE — 72040 X-RAY EXAM NECK SPINE 2-3 VW: CPT

## 2022-02-28 NOTE — FLOWSHEET NOTE
509 [facility]   [facility]   Phone: [phone]   Fax: [phone]    Physical Therapy Daily Treatment Note      Date:  2022  Patient Name:  Kristina Cabrera    :  1960  MRN: [id number]  Physician: Janay Jarrett MD      Insurance: Medicare ([address] [place] [address]) TRACK CAP  Medical Diagnosis:   G83.4 (ICD-10-CM) - Cauda equina syndrome ([address]  M48.062 (ICD-10-CM) - Lumbar stenosis with neurogenic claudication                   Rehab Codes: Difficulty walking (R26.2), Muscle weakness (generalized) M62. 81, Low back pain M54. 50  Onset date: 21             Next 's appt. : 22  Visit Count: 15/18                    Cancel/No Show: 0/0     Subjective:  Patient reports having imagine done prior to therapy today, which has resulted in her feeling very tense and anxious. She states that laying on the Xray and CT table caused her low back to become very tense. Pain:  [x] Yes  [] No Location: low back and B LEs  Pain Rating: (0-10 scale) 7/10   Pain altered Tx:  [x] No  [] Yes  Action:  Comments: Ambulates with rolling walker demonstrating a fwd head posture. Objective:  Modalities:    Precautions:  Exercises:  Exercise Reps/ Time Weight/ Level Comments Completed   Nustep 6'  4   x   Abdominal bracing 15x 5\" holds      PPT   15x   HEP    PPT with march  15x2   HEP    SLR mid range  10x   HEP    SL hip abduction          SL Hip extension      Pt reports she cannot lay prone    SL clam   10x2        Bridges   10x2        Heel slides + TrA 10x      Hip ADD + TrA 15x 3\" holds             Seated          LAQ* 20x2 2#   x   Marching* 20x2 1#     Hip ABD/ADD* 20x 3\" Yellow TB     Glut sets* 20x 3\"             Standing       Mini Squats* 15x2      Repeated Sit to stands * x10    arms crossed over chest this date Gayathri-Mod A; increased assistance needed with inc fatigue.     Marching* 15x2 2#  x   Hip ABD/extension* 10x  BUE support    Toe raises 2 x 10   x Seated hs stretch  3x30\" ea 6\" step  x   Seated hs curls  10x ea yellow     NBOS on foam 3x30\"      Eric step overs L  10x ea     light UE support F/L     Step ups F/L   10x ea 6'      3 way hip   15x 2#   x   Cone taps F/diagonal   10x   Added crossing midline 2/25    Standing hamstring curls  15x 2#   x   (B) tandem stance  30\"x3     x    ambulation cone weaves 4 cones   x3    With AD RW  x   Ambulation with [facility] 20'x2  CGA A; added 2/9            L heel to R shin   x10    AAROM     Ambulation with long mirror 50ft x4        Other:    Specific Instructions for next treatment: hip/ core strengthening, functional strengthening and work on L foot clearance, progress to standing LE/core strengthening. Assessment: [x] Progressing toward goals. Initiated treatment with stretching due to patient complaints of tension in low back and bilateral LE's. While on nustep, verbal and visual cues given for control of L LE, specifically avoiding internal rotation. Patient responded well and was able to control LE more. Standing exercises also required both moderate verbal and tactile cues for upright posture due to demonstration of forward flexed trunk and neck. Patient unable to maintain without continuous cueing. Patient showed improvement during gait training with control L foot clearance after giving verbal cues. [] No change. [] Other:    [x] Patient would continue to benefit from skilled physical therapy services in order to: address the above deficits as patients goal is to ambulate safely without an AD      STG: (to be met in 9 treatments)  1. ? Strength: of L LE to 4+/5 to improve functional strength to ease completion of transfers. 2. ? Function: Patient to complete TUG in <15\" with RW to decrease fall risk. 3. Patient to ambulate with RW with normal gait pattern.   4. Independent with Home Exercise Programs MET per progress note 2/18  5.  Demonstrate Knowledge of fall prevention MET per progress note 2/18    LTG: (to be met in 18 treatments)  1. Patient to complete 5x sit to stand test in <20\" without UE support to improve functional strength for transfers. 2. Patient to ambulate without AD with adequate left foot clearance and no evidence of imbalance.                     Patient goals: to walk without the walker     Pt. Education:  [x] Yes  [] No  [] Reviewed Prior HEP/Ed  Method of Education: [x] Verbal  [] Demo  [] Written  Comprehension of Education:  [x] Verbalizes understanding. [] Demonstrates understanding. [] Needs review. [] Demonstrates/verbalizes HEP/Ed previously given. Plan: [x] Continue per plan of care.    [] Other:      Treatment Charges: Mins Units   []  Modalities     [x]  Ther Exercise 30 2   []  Manual Therapy     []  Ther Activities     []  Aquatics     []  Neuromuscular     [] Vasocompression     [x] Gait Training 15 1   [] Dry needling        [] 1 or 2 muscles        [] 3 or more muscles     []  Other     Total Treatment time 45 3     Time In: 11:45 am        Time Out: 12:30pm    Electronically signed by:  Shanna Gilbert PTA

## 2022-03-02 ENCOUNTER — HOSPITAL ENCOUNTER (OUTPATIENT)
Dept: PHYSICAL THERAPY | Age: 62
Setting detail: THERAPIES SERIES
Discharge: HOME | End: 2022-03-02
Payer: MEDICARE

## 2022-03-02 PROCEDURE — 97110 THERAPEUTIC EXERCISES: CPT

## 2022-03-02 PROCEDURE — 97116 GAIT TRAINING THERAPY: CPT

## 2022-03-02 NOTE — FLOWSHEET NOTE
[facility] Outpatient Physical Therapy   [address]   Phone: [phone]   Fax: [phone]    Physical Therapy Daily Treatment Note      Date:  3/2/2022  Patient Name:  Mike Frazier    :  1960  MRN: [id number]  Physician: Lorna Noel MD      Insurance: Medicare ([address] [place] [address]) TRACK CAP  Medical Diagnosis:   G83.4 (ICD-10-CM) - Cauda equina syndrome ([address]  M48.062 (ICD-10-CM) - Lumbar stenosis with neurogenic claudication                   Rehab Codes: Difficulty walking (R26.2), Muscle weakness (generalized) M62. 81, Low back pain M54. 50  Onset date: 21             Next 's appt. : 22  Visit Count:                     Cancel/No Show: 0/0     Subjective:  Patient arrives to therapy with no new complaints. States she's experiencing her continued pain, and stiffness through BLE. Pain:  [x] Yes  [] No Location: low back and B LEs  Pain Rating: (0-10 scale) 5/10   Pain altered Tx:  [x] No  [] Yes  Action:  Comments: Ambulates with rolling walker demonstrating a fwd head posture. Pt arrived 8 minutes late to therapy. Objective:  Modalities:    Precautions:  Exercises:  Exercise Reps/ Time Weight/ Level Comments Completed   Nustep 6'  4   x   Abdominal bracing 15x 5\" holds      PPT   15x   HEP    PPT with march  15x2   HEP    SLR mid range  10x   HEP    SL hip abduction          SL Hip extension      Pt reports she cannot lay prone    SL clam   10x2        Bridges   10x2        Heel slides + TrA 10x      Hip ADD + TrA 15x 3\" holds             Seated          LAQ* 20x2 2#      Marching* 20x2 1#     Hip ABD/ADD* 20x 3\" Yellow TB     Glut sets* 20x 3\"             Standing       Mini Squats* 15x2      Repeated Sit to stands * x10    arms crossed over chest this date Gayathri-Mod A; increased assistance needed with inc fatigue.     Marching* 15x2 2#  x   Hip ABD/extension* 10x  BUE support    Toe raises 2 x 10   x   Seated hs stretch  3x30\" ea 6\" step  x   Seated hs curls  10x ea yellow     NBOS on foam 3x30\"      Basil step overs F/L  4x ea     light UE support F/L  x   Step ups F/L   10x 6'   x   3 way hip   15x 2#   x   Cone taps F/diagonal   10x   Added crossing midline 2/25    Standing hamstring curls  15x 2#      (B) tandem stance  30\"x3         ambulation cone weaves 4 cones   x3    With AD RW     Ambulation with LBQC 20'x2  CGA A; added 2/9            L heel to R shin   x10    AAROM     Ambulation with long mirror 50ft x4        Other:    Specific Instructions for next treatment: hip/ core strengthening, functional strengthening and work on L foot clearance, progress to standing LE/core strengthening. Assessment: [x] Progressing toward goals. Initiated treatment with Nustep to improve blood circulation and loosen tight musculature in BLE. Continued with exercise program as noted in chart above with emphasis on foot clearance when ambulating. Resumed ambulation with hurdles to work on control of hip flexors. Pt demos better control of LLE when leading with L foot, and increased difficulty clearing basil with LLE while leading with R foot. Intermittent cueing through treatment session to correct forward flexed posture. [] No change. [] Other:    [x] Patient would continue to benefit from skilled physical therapy services in order to: address the above deficits as patients goal is to ambulate safely without an AD      STG: (to be met in 9 treatments)  1. ? Strength: of L LE to 4+/5 to improve functional strength to ease completion of transfers. 2. ? Function: Patient to complete TUG in <15\" with RW to decrease fall risk. 3. Patient to ambulate with RW with normal gait pattern.   4. Independent with Home Exercise Programs MET per progress note 2/18  5. Demonstrate Knowledge of fall prevention MET per progress note 2/18    LTG: (to be met in 18 treatments)  1.  Patient to complete 5x sit to stand test in <20\" without UE support to improve functional strength for transfers. 2. Patient to ambulate without AD with adequate left foot clearance and no evidence of imbalance.                     Patient goals: to walk without the walker     Pt. Education:  [x] Yes  [] No  [] Reviewed Prior HEP/Ed  Method of Education: [x] Verbal  [] Demo  [] Written  Comprehension of Education:  [x] Verbalizes understanding. [] Demonstrates understanding. [] Needs review. [] Demonstrates/verbalizes HEP/Ed previously given. Plan: [x] Continue per plan of care. [] Other:      Treatment Charges: Mins Units   []  Modalities     [x]  Ther Exercise 32 2   []  Manual Therapy     []  Ther Activities     []  Aquatics     []  Neuromuscular     [] Vasocompression     [x] Gait Training 10 1   [] Dry needling        [] 1 or 2 muscles        [] 3 or more muscles     []  Other     Total Treatment time 42 3     Time In: 11:08 am        Time Out: 11:50pm    Electronically signed by:   Bruce Huhges PTA

## 2022-03-04 ENCOUNTER — HOSPITAL ENCOUNTER (OUTPATIENT)
Dept: PHYSICAL THERAPY | Age: 62
Setting detail: THERAPIES SERIES
Discharge: HOME | End: 2022-03-04
Payer: MEDICARE

## 2022-03-04 PROCEDURE — 97112 NEUROMUSCULAR REEDUCATION: CPT

## 2022-03-04 PROCEDURE — 97110 THERAPEUTIC EXERCISES: CPT

## 2022-03-04 NOTE — FLOWSHEET NOTE
[facility] Outpatient Physical Therapy   [address]   Phone: [phone]   Fax: [phone]    Physical Therapy Daily Treatment Note      Date:  3/4/2022  Patient Name:  Elias Mathias    :  1960  MRN: [id number]  Physician: Hector Judge MD      Insurance: Medicare ([address] [place] [address]) TRACK CAP  Medical Diagnosis:   G83.4 (ICD-10-CM) - Cauda equina syndrome ([address]  M48.062 (ICD-10-CM) - Lumbar stenosis with neurogenic claudication                   Rehab Codes: Difficulty walking (R26.2), Muscle weakness (generalized) M62. 81, Low back pain M54. 50  Onset date: 21             Next Dr's appt. : 22  Visit Count:  (PN due at visit 21)                    Cancel/No Show: 0/0     Subjective:  Patient arrives to therapy with complaints of numbness in her left leg that she feels is still getting worse. Patient reports she has not received results of her cervical spine Xray/CT scan yet. Pain:  [x] Yes  [] No Location: low back and B LEs  Pain Rating: (0 -10 scale) 5/10   Pain altered Tx:  [x] No  [] Yes  Action:  Comments:     Objective:  Modalities:    Precautions:  Exercises:  Exercise Reps/ Time Weight/ Level Comments Completed   Nustep 8'  4   x   Abdominal bracing 15x 5\" holds      PPT   15x   HEP    PPT with march  15x2   HEP    SLR mid range  10x   HEP    SL hip abduction          SL Hip extension      Pt reports she cannot lay prone    SL clam   10x2        Bridges   10x2        Heel slides + TrA 10x      Hip ADD + TrA 15x 3\" holds             Seated          LAQ* 2x10 2#   X   Marching* 20x2 1#     Hip ABD/ADD* 20x 3\" Yellow TB     Glut sets* 20x 3\"             Standing       Mini Squats* 15x2      Repeated Sit to stands * x10    arms crossed over chest this date Gayathri-Mod A; increased assistance needed with inc fatigue.     Marching* 15x2 2#     Hip ABD/extension* 10x  BUE support    Toe raises 2 x 10      Seated hs stretch  3x30\" ea 6\" step     Seated hs curls 10x ea yellow     NBOS on foam 3x30\"      Basil step overs F/L  10x ea  2#/ not for lateral  light UE support F/L   Step over/back left only  x   Step ups F/L   10x 6'   x   3 way hip   15x 2#      4\" toe taps  10x   Alternating focus on minimal UE support- CGA by therapist.  X   Cone taps F/diagonal   10x   Added crossing midline 2/25    Standing hamstring curls  15x 2#      (B) tandem stance  30\"x3         ambulation cone weaves 4 cones   x3    With AD RW     Ambulation with LBQC 20'x2  CGA A; added 2/9    Supine D1 LE flexion/ext x10 2# AW Therapist assisted. X    L heel to R shin   x10    AAROM     Ambulation with long mirror 50ft x4        Other:    Specific Instructions for next treatment: hip/ core strengthening, functional strengthening and work on L foot clearance, progress to standing LE/core strengthening. Assessment: [x] Progressing toward goals. Initiated treatment with Nustep for reciprical LE movement to improve gait mechanics. Added Supine PNF to focus on functional movement patterns in D1 flex/ext to L LE as patient continues to have difficulty with left foot clearance. Patient exhibits hyperextension in L LE when attempting to keep it straight in stance phase with spastic movement present. Improved control to left knee with tactile cues from therapist. Added weight to basil step over to provide more proprioceptive feed back to L LE. Patient unable to clear basil with AW for lateral step overs and AW removed with some carry over as patient is able to clear basil. Patient fatigues easily following PNF this date and experiences B LE spasms. Continue with PNF to improve functional movement patterns and improve L foot clearance. [] No change.      [] Other:    [x] Patient would continue to benefit from skilled physical therapy services in order to: address the above deficits as patients goal is to ambulate safely without an AD      STG: (to be met in 9 treatments)  1. ? Strength: of L LE to 4+/5 to improve functional strength to ease completion of transfers. 2. ? Function: Patient to complete TUG in <15\" with RW to decrease fall risk. 3. Patient to ambulate with RW with normal gait pattern.   4. Independent with Home Exercise Programs MET per progress note 2/18  5. Demonstrate Knowledge of fall prevention MET per progress note 2/18    LTG: (to be met in 18 treatments)  1. Patient to complete 5x sit to stand test in <20\" without UE support to improve functional strength for transfers. 2. Patient to ambulate without AD with adequate left foot clearance and no evidence of imbalance.                     Patient goals: to walk without the walker     Pt. Education:  [x] Yes  [] No  [] Reviewed Prior HEP/Ed  Method of Education: [x] Verbal  [] Demo  [] Written  Comprehension of Education:  [x] Verbalizes understanding. [] Demonstrates understanding. [] Needs review. [] Demonstrates/verbalizes HEP/Ed previously given. Plan: [x] Continue per plan of care. [] Other:      Treatment Charges: Mins Units   []  Modalities     [x]  Ther Exercise 23 2   []  Manual Therapy     []  Ther Activities     []  Aquatics     [x]  Neuromuscular 22 1   [] Vasocompression     [x] Gait Training     [] Dry needling        [] 1 or 2 muscles        [] 3 or more muscles     []  Other     Total Treatment time 45 3     Time In: 1:30pm        Time Out: 2:15pm     Electronically signed by:   Annette Ovalle PTA

## 2022-03-07 ENCOUNTER — HOSPITAL ENCOUNTER (OUTPATIENT)
Dept: PHYSICAL THERAPY | Age: 62
Setting detail: THERAPIES SERIES
Discharge: HOME | End: 2022-03-07
Payer: MEDICARE

## 2022-03-07 PROCEDURE — 97112 NEUROMUSCULAR REEDUCATION: CPT

## 2022-03-07 PROCEDURE — 97110 THERAPEUTIC EXERCISES: CPT

## 2022-03-07 NOTE — FLOWSHEET NOTE
[facility] Outpatient Physical Therapy   [address]   Phone: [phone]   Fax: [phone]    Physical Therapy Daily Treatment Note      Date:  3/7/2022  Patient Name:  Marisa Alfred    :  1960  MRN: [id number]  Physician: Choco Peña MD      Insurance: Medicare ([address] [place] [address]) TRACK CAP  Medical Diagnosis:   G83.4 (ICD-10-CM) - Cauda equina syndrome ([address]  M48.062 (ICD-10-CM) - Lumbar stenosis with neurogenic claudication                   Rehab Codes: Difficulty walking (R26.2), Muscle weakness (generalized) M62. 81, Low back pain M54. 50  Onset date: 21             Next Dr's appt. : 22  Visit Count:  (PN due at visit 21)                    Cancel/No Show: 0/0     Subjective:  Patient has no new complaints. Patient denies falls since last session. Pain remains the same in both low back and (B) LE's, pain noted in cervical region today as well. Pain:  [x] Yes  [] No Location: low back and B LEs  Pain Rating: (0 -10 scale) 5/10   Pain altered Tx:  [x] No  [] Yes  Action:  Comments:     Objective:  Modalities:    Precautions:  Exercises:  Exercise Reps/ Time Weight/ Level Comments Completed   Nustep 8'  4   x   Abdominal bracing 15x 5\" holds      PPT   15x   HEP    PPT with march  15x2   HEP    SLR mid range  10x   HEP    SL hip abduction          SL Hip extension      Pt reports she cannot lay prone    SL clam   10x2        Bridges   10x2        Heel slides + TrA 10x      Hip ADD + TrA 15x 3\" holds             Seated          LAQ* 2x10 2#   X   Marching* 20x2 1#     Hip ABD/ADD* 20x 3\" Yellow TB     Glut sets* 20x 3\"             Standing       Mini Squats* 15x2      Repeated Sit to stands * x10    arms crossed over chest this date Gayathri-Mod A; increased assistance needed with inc fatigue.     Marching* 15x2 1.5# No UE support  x   Hip ABD/extension* 10x  BUE support    Toe raises 2 x 10      Seated hs stretch  3x30\" ea 6\" step     Seated hs curls  10x ea yellow     NBOS on foam 3x30\"      Eric step overs F  10x ea  1.5#/   light UE support F  Step over/back left only  x   Step ups F/L   10x 6'   x   3 way hip   15x 1.5#   x   4\" toe taps  10x   Alternating focus on minimal UE support- CGA by therapist.  X   Cone taps F/diagonal   10x   Added crossing midline 2/25    Standing hamstring curls  15x 1.5#   x   (B) tandem stance  30\"x3     x    ambulation cone weaves 4 cones   x3    With AD RW     Ambulation with [facility] 20'x2  CGA A; added 2/9    Supine D1 LE flexion/ext x10 2# AW Therapist assisted.      L heel to R shin   x10    AAROM     Ambulation in //bars fwd/retro/lat/marching  4 passes ea  1.5#  minimal UE support  x   Other:    Specific Instructions for next treatment: hip/ core strengthening, functional strengthening and work on L foot clearance, progress to standing LE/core strengthening. Assessment: [x] Progressing toward goals. Continued with (B) LE strengthening and proprioceptive training. Challenged patient to have minimal- no UE support during standing exercises. Patient noted muscle cramping in ()B LE's with slow marching that required her to have a seated rest break. Decreased ankle weight by .5# with better tolerance noted with increased hip flexion and demonstrated better foot clearance. [] No change. [] Other:    [x] Patient would continue to benefit from skilled physical therapy services in order to: address the above deficits as patients goal is to ambulate safely without an AD      STG: (to be met in 9 treatments)  1. ? Strength: of L LE to 4+/5 to improve functional strength to ease completion of transfers. 2. ? Function: Patient to complete TUG in <15\" with RW to decrease fall risk. 3. Patient to ambulate with RW with normal gait pattern.   4. Independent with Home Exercise Programs MET per progress note 2/18  5.  Demonstrate Knowledge of fall prevention MET per progress note 2/18    LTG: (to be met in 18 treatments)  1. Patient to complete 5x sit to stand test in <20\" without UE support to improve functional strength for transfers. 2. Patient to ambulate without AD with adequate left foot clearance and no evidence of imbalance.                     Patient goals: to walk without the walker     Pt. Education:  [x] Yes  [] No  [] Reviewed Prior HEP/Ed  Method of Education: [x] Verbal  [] Demo  [] Written  Comprehension of Education:  [x] Verbalizes understanding. [] Demonstrates understanding. [] Needs review. [] Demonstrates/verbalizes HEP/Ed previously given. Plan: [x] Continue per plan of care.    [] Other:      Treatment Charges: Mins Units   []  Modalities     [x]  Ther Exercise 25 2   []  Manual Therapy     []  Ther Activities     []  Aquatics     [x]  Neuromuscular 20 1   [] Vasocompression     [x] Gait Training     [] Dry needling        [] 1 or 2 muscles        [] 3 or more muscles     []  Other     Total Treatment time 45 3     Time In: 1125am        Time Out: 1210pm     Electronically signed by:  Sharda Arango PTA

## 2022-03-08 ENCOUNTER — TELEPHONE (OUTPATIENT)
Dept: PHYSICAL MEDICINE AND REHAB | Age: 62
End: 2022-03-08

## 2022-03-08 NOTE — TELEPHONE ENCOUNTER
Patient was discharged 01/14/22 and was scheduled for rehab follow up on 02/09/22. Patient no call no showed to that appointment. I have attempted to reach the patient on multiple occasions to reschedule and have left messages (02/09, 02/22 and 03/08). Today I attempted the home phone number listed again, which goes straight to . Also attempted the secondary number listed but it just rang and eventually returned a busy signal.     Just wanted you to be aware.

## 2022-03-09 ENCOUNTER — HOSPITAL ENCOUNTER (OUTPATIENT)
Dept: PHYSICAL THERAPY | Age: 62
Setting detail: THERAPIES SERIES
Discharge: HOME | End: 2022-03-09
Payer: MEDICARE

## 2022-03-09 PROCEDURE — 97112 NEUROMUSCULAR REEDUCATION: CPT

## 2022-03-09 PROCEDURE — 97110 THERAPEUTIC EXERCISES: CPT

## 2022-03-09 NOTE — FLOWSHEET NOTE
[facility] Outpatient Physical Therapy   [address]   Phone: [phone]   Fax: [phone]    Progress Note/ Physical Therapy Daily Treatment Note      Date:  3/9/2022  Patient Name:  Manuela Sampson    :  1960  MRN: [id number]  Physician: Christal Robles MD      Insurance: Medicare ([address] [place] [address]) TRACK CAP  Medical Diagnosis:   G83.4 (ICD-10-CM) - Cauda equina syndrome ([address]  M48.062 (ICD-10-CM) - Lumbar stenosis with neurogenic claudication                   Rehab Codes: Difficulty walking (R26.2), Muscle weakness (generalized) M62. 81, Low back pain M54. 50  Onset date: 21             Next 's appt. : 22  Visit Count:  (PN done visit 23)                Cancel/No Show: 0/0     Subjective:  Patient has no new complaints. Reports her left leg seems to be working a little better today but states \"it depends on the day\" some days her left leg works better than others.    Pain:  [x] Yes  [] No Location: low back and B LEs  Pain Rating: (0 -10 scale) 5/10   Pain altered Tx:  [x] No  [] Yes  Action:  Comments:     Objective:  Modalities:    Precautions:  Exercises:  Exercise Reps/ Time Weight/ Level Comments Completed   Nustep 5'  4   x   Abdominal bracing 15x 5\" holds      PPT   15x   HEP    PPT with march  15x2   HEP    SLR mid range  10x   HEP    SL hip abduction          SL Hip extension      Pt reports she cannot lay prone    SL clam   10x2        Bridges   10x2        Heel slides + TrA 10x      Hip ADD + TrA 15x 3\" holds             Seated          LAQ* 2x10 2#      Marching* 20x2 1#     Hip ABD/ADD* 20x 3\" Yellow TB     Glut sets* 20x 3\"             Standing       Mini Squats* 15x2      Squat to chair touch  x10  Therapist assisted for form and vball between knees to prevent left valgus x   Repeated Sit to stands * x20    Foam to elevate chair surface - B UE to hands on knees   x   Marching* 15x2 1.5# No UE support     Hip ABD/extension* 10x  BUE support    Toe raises 2 x 10      Seated hs stretch  3x30\" ea 6\" step     Seated hs curls  10x ea yellow     NBOS on foam 3x30\"      Eric step overs F  10x ea  1.5#/   light UE support F  Step over/back left only     Step ups F/L   10x 6'   x   3 way hip   15x 1.5#      4\" toe taps  10x   Alternating focus on minimal UE support- CGA by therapist.  X   Cone taps F L only    10x   Added crossing midline 2/25 x   Standing hamstring curls  15x 1.5#      (B) tandem stance  30\"x3         ambulation cone weaves 4 cones   x3    With AD RW     Ambulation with [facility] 20'x2  CGA A; added 2/9    Supine D1 LE flexion/ext x20  Therapist assisted. x    L heel to R shin   x10    AAROM     Ambulation in //bars fwd/retro/lat/marching  4 passes ea  1.5#  minimal UE support     Other:    Specific Instructions for next treatment: hip/ core strengthening, functional strengthening and work on L foot clearance, progress to standing LE/core strengthening. Objective measurements:   Attempted 5x Sit to stand test but patient required multiple attempts to stand and requires UE support. MMT to B LEs:  Left hip flexion 3+/5, left hip abduction 3+/5, L hip extension 2-/5, L knee extension 4/5, L knee flexion 4/5, ankle DF/InV/Ev 4-/5    Assessment: [x] Progressing toward goals. Patient with improved L foot clearance this date with completion of cone taps achieving cone height throughout entire set. Patient also exhibits less fatigue through out session this date as well. Attempted 5x sit to stand test,however patient unable to complete without UE support. She continues to demonstrate left genu valgus during squats and when lifting LE that is also accompanied by left hip internal rotation. Slight improvement in left hip flexion strength based on MMT, however the rest of L LE remains same as last progress note.   Patient completes supine PNF patterns this date with AROM and minimal tactile cues/support from therapist. She continues to have difficulty achieving active left ankle DF and lack eccentric control in extension phase of D1 PNF pattern. I seen significant progress this date in patients L step height, foot clearance with improved hip flexion AROM that is slowly improving her gait. She will benefit from continuing PT to continue with focus on PNF and functional strengthening to normalize gait and improve safety and technique with functional transfers to decrease fall risk. STG: (to be met in 9 treatments)  1. ? Strength: of L LE to 4+/5 to improve functional strength to ease completion of transfers. ONGOING  2. ? Function: Patient to complete TUG in <15\" with RW to decrease fall risk. ONGOING  3. Patient to ambulate with RW with normal gait pattern. ONGOING  4. Independent with Home Exercise Programs MET per progress note 2/18  5. Demonstrate Knowledge of fall prevention MET per progress note 2/18    LTG: (to be met in 25 treatments)  1. Patient to complete 5x sit to stand test in <20\" without UE support to improve functional strength for transfers. ONGOING    2. Patient to ambulate without AD with adequate left foot clearance and no evidence of imbalance. ONGOING                    Patient goals: to walk without the walker     Pt. Education:  [x] Yes  [] No  [] Reviewed Prior HEP/Ed  Method of Education: [x] Verbal  [] Demo  [] Written  Comprehension of Education:  [x] Verbalizes understanding. [] Demonstrates understanding. [] Needs review. [] Demonstrates/verbalizes HEP/Ed previously given. Plan: [x] Continue per plan of care.    [] Other:      Treatment Charges: Mins Units   []  Modalities     [x]  Ther Exercise 16 1   []  Manual Therapy     []  Ther Activities     []  Aquatics     [x]  Neuromuscular 23 2   [] Vasocompression     [x] Gait Training     [] Dry needling        [] 1 or 2 muscles        [] 3 or more muscles     []  Other     Total Treatment time 39 3     Time In: 1132am        Time Out: 1211pm     Electronically signed by: Iam Waller, PTA

## 2022-03-11 ENCOUNTER — HOSPITAL ENCOUNTER (OUTPATIENT)
Dept: PHYSICAL THERAPY | Age: 62
Setting detail: THERAPIES SERIES
Discharge: HOME | End: 2022-03-11
Payer: MEDICARE

## 2022-03-11 NOTE — PROGRESS NOTES
[x] [address] [facility]  [address]  [place]: [phone]   F: [phone]     Physical Therapy Cancel/No Show note    Date: 3/11/2022  Patient: Manuela Sampson  : 1960  MRN: [id number]    Visit count   Cancels/No Shows to date:     For today's appointment patient:    [x]  Cancelled    [] Rescheduled appointment    [] No-show     Reason given by patient:    []  Patient ill    []  Conflicting appointment    [] No transportation      [] Conflict with work    [x] No reason given    [] Weather related    [] [id number]    [] Other:      Comments:        [x] Next appointment was confirmed    Electronically signed by: Aiyana Webster, PT

## 2022-03-14 ENCOUNTER — HOSPITAL ENCOUNTER (OUTPATIENT)
Dept: PHYSICAL THERAPY | Age: 62
Setting detail: THERAPIES SERIES
Discharge: HOME | End: 2022-03-14
Payer: MEDICARE

## 2022-03-14 PROCEDURE — 97110 THERAPEUTIC EXERCISES: CPT

## 2022-03-14 NOTE — FLOWSHEET NOTE
[address] [facility]   [address]   Phone: [phone]   Fax: [phone]    Progress Note/ Physical Therapy Daily Treatment Note      Date:  3/14/2022  Patient Name:  Tona Hardin    :  1960  MRN: [id number]  Physician: Naomie Fritz MD      Insurance: Medicare ([address] [place] [address]) TRACK CAP  Medical Diagnosis:   G83.4 (ICD-10-CM) - Cauda equina syndrome ([address]  M48.062 (ICD-10-CM) - Lumbar stenosis with neurogenic claudication                   Rehab Codes: Difficulty walking (R26.2), Muscle weakness (generalized) M62. 81, Low back pain M54. 50  Onset date: 21             Next 's appt. : 22  Visit Count:  (PN done visit 19)                Cancel/No Show: 0/0     Subjective:  Patient arrived to PT 10 minutes late. Patient states her legs have been getting worse since last week. Patient states it is getting harder and harder to use them and states she has no control over them L>R. Patient reports it was hard for her to walk in today and that is why she was a little late.    Pain:  [x] Yes  [] No Location: low back and B LEs  Pain Rating: (0 -10 scale) 8/10 with tingling/numbness down both LE's but L>R  Pain altered Tx:  [x] No  [] Yes  Action:  Comments:     Objective:  Modalities:    Precautions:  Exercises:  Exercise Reps/ Time Weight/ Level Comments Completed   Nustep 5'  4   x   Abdominal bracing 15x 5\" holds      PPT   15x   HEP    PPT with march  15x2   HEP    SLR mid range  10x   HEP    SL hip abduction          SL Hip extension      Pt reports she cannot lay prone    SL clam   10x2        Bridges   10x2        Heel slides + TrA 10x      Hip ADD + TrA 15x 3\" holds             Seated          LAQ* 2x10 2#      Marching* 20x2 1#     Hip ABD/ADD* 20x 3\" Yellow TB     Glut sets* 20x 3\"             Standing       Mini Squats* 15x2      Squat to chair touch  x10  Therapist assisted for form and vball between knees to prevent left valgus x   Repeated Sit to stands * x20    Foam to elevate chair surface - B UE to hands on knees   x   Marching* 15x2  No weight 3/14 x   Hip ABD/extension* 10x  BUE support    Toe raises 2 x 10      Seated hs stretch  3x30\" ea 6\" step     Seated hs curls  10x ea yellow     NBOS on foam 3x30\"      Eric step overs F  10x ea  1.5#/   light UE support F  Step over/back left only     Step ups F/L   10x 6'   x   3 way hip   15x   no weight 3/14  x   4\" toe taps  10x   Alternating focus on minimal UE support- CGA by therapist.  X   Cone taps F L only    10x   Added crossing midline 2/25 x   Standing hamstring curls  15x 1.5#      (B) tandem stance  30\"x3     x    ambulation cone weaves 4 cones   x3    With AD RW     Ambulation with [facility] [place] [place] 20'x2  CGA A; added 2/9    Supine D1 LE flexion/ext x20  Therapist assisted.      L heel to R shin   x10    AAROM     Ambulation in //bars fwd/retro/lat/marching  4 passes ea  1.5#  minimal UE support     Other:    Specific Instructions for next treatment: hip/ core strengthening, functional strengthening and work on L foot clearance, progress to standing LE/core strengthening. Assessment: [x] Progressing toward goals. 3/14: Prior to exercise vitals taken /77 and 70 bpm, O2 99% patient states she has high blood pressure but is compliant with taking medication. Patient demonstrated increased ataxia especially with LLE step and movement. Patient continuously noted her legs [facility] and required short sitting rest breaks throughout. Symptoms began to improve during session but patient noted increased fatigue at end resulting in long sitting rest break prior to exiting gym. Patient had no bouts of LOB that was not able to be self corrected, CGA throughout entire session, cues to increase LLE stance time during gait and facilitate better heel/toe movement.        STG: (to be met in 9 treatments)  1. ? Strength: of L LE to 4+/5 to improve functional strength to ease completion of transfers. ONGOING  2. ? Function: Patient to complete TUG in <15\" with RW to decrease fall risk. ONGOING  3. Patient to ambulate with RW with normal gait pattern. ONGOING  4. Independent with Home Exercise Programs MET per progress note 2/18  5. Demonstrate Knowledge of fall prevention MET per progress note 2/18    LTG: (to be met in 25 treatments)  1. Patient to complete 5x sit to stand test in <20\" without UE support to improve functional strength for transfers. ONGOING    2. Patient to ambulate without AD with adequate left foot clearance and no evidence of imbalance. ONGOING                    Patient goals: to walk without the walker     Pt. Education:  [x] Yes  [] No  [] Reviewed Prior HEP/Ed  Method of Education: [x] Verbal  [] Demo  [] Written  Comprehension of Education:  [x] Verbalizes understanding. [] Demonstrates understanding. [] Needs review. [] Demonstrates/verbalizes HEP/Ed previously given. Plan: [x] Continue per plan of care.    [] Other:      Treatment Charges: Mins Units   []  Modalities     [x]  Ther Exercise 35 2   []  Manual Therapy     []  Ther Activities     []  Aquatics     []  Neuromuscular     [] Vasocompression     [] Gait Training     [] Dry needling        [] 1 or 2 muscles        [] 3 or more muscles     []  Other     Total Treatment time 35 2     Time In: 1110am        Time Out: 1145am     Electronically signed by:  Eugenia Spivey PTA

## 2022-03-16 ENCOUNTER — HOSPITAL ENCOUNTER (OUTPATIENT)
Dept: PHYSICAL THERAPY | Age: 62
Setting detail: THERAPIES SERIES
Discharge: HOME | End: 2022-03-16
Payer: MEDICARE

## 2022-03-16 NOTE — PROGRESS NOTES
[x] [facility]) [facility]  [address]  [place]: [phone]   F: [phone]     Physical Therapy Cancel/No Show note    Date: 3/16/2022  Patient: Mary Fermin  : 1960  MRN: [id number]    Visit count   Cancels/No Shows to date:     For today's appointment patient:    [x]  Cancelled    [] Rescheduled appointment    [] No-show     Reason given by patient:    []  Patient ill    []  Conflicting appointment    [] No transportation      [] Conflict with work    [] No reason given    [] Weather related    [] COVID-19    [x] Other:   Patients legs not moving well, not steady on her feet. She cx upcoming appts and is going to see her doctor before returning to PT .     Comments:        [] Next appointment was confirmed    Electronically signed by: Micha Painter PT

## 2022-03-18 ENCOUNTER — OFFICE VISIT (OUTPATIENT)
Dept: PRIMARY CARE CLINIC | Age: 62
End: 2022-03-18
Payer: MEDICARE

## 2022-03-18 VITALS — OXYGEN SATURATION: 98 % | SYSTOLIC BLOOD PRESSURE: 154 MMHG | HEART RATE: 88 BPM | DIASTOLIC BLOOD PRESSURE: 78 MMHG

## 2022-03-18 DIAGNOSIS — Z00.00 MEDICARE ANNUAL WELLNESS VISIT, SUBSEQUENT: Primary | ICD-10-CM

## 2022-03-18 DIAGNOSIS — Z12.31 BREAST CANCER SCREENING BY MAMMOGRAM: ICD-10-CM

## 2022-03-18 DIAGNOSIS — I10 ESSENTIAL HYPERTENSION: ICD-10-CM

## 2022-03-18 DIAGNOSIS — E03.8 OTHER SPECIFIED HYPOTHYROIDISM: ICD-10-CM

## 2022-03-18 DIAGNOSIS — M48.062 LUMBAR STENOSIS WITH NEUROGENIC CLAUDICATION: ICD-10-CM

## 2022-03-18 DIAGNOSIS — R20.2 NUMBNESS AND TINGLING OF BOTH LOWER EXTREMITIES: ICD-10-CM

## 2022-03-18 DIAGNOSIS — M47.819 DEGENERATIVE JOINT DISEASE OF SPINAL FACET JOINT: ICD-10-CM

## 2022-03-18 DIAGNOSIS — R20.0 NUMBNESS AND TINGLING OF BOTH LOWER EXTREMITIES: ICD-10-CM

## 2022-03-18 DIAGNOSIS — R20.0 NUMBNESS AND TINGLING OF BOTH UPPER EXTREMITIES: ICD-10-CM

## 2022-03-18 DIAGNOSIS — G95.9 CERVICAL MYELOPATHY (HCC): ICD-10-CM

## 2022-03-18 DIAGNOSIS — R20.2 NUMBNESS AND TINGLING OF BOTH UPPER EXTREMITIES: ICD-10-CM

## 2022-03-18 PROCEDURE — 3017F COLORECTAL CA SCREEN DOC REV: CPT | Performed by: FAMILY MEDICINE

## 2022-03-18 PROCEDURE — G0439 PPPS, SUBSEQ VISIT: HCPCS | Performed by: FAMILY MEDICINE

## 2022-03-18 PROCEDURE — G8482 FLU IMMUNIZE ORDER/ADMIN: HCPCS | Performed by: FAMILY MEDICINE

## 2022-03-18 ASSESSMENT — LIFESTYLE VARIABLES
HOW OFTEN DO YOU HAVE A DRINK CONTAINING ALCOHOL: 2-4 TIMES A MONTH
HOW MANY STANDARD DRINKS CONTAINING ALCOHOL DO YOU HAVE ON A TYPICAL DAY: 3 OR 4

## 2022-03-18 ASSESSMENT — PATIENT HEALTH QUESTIONNAIRE - PHQ9
SUM OF ALL RESPONSES TO PHQ9 QUESTIONS 1 & 2: 1
1. LITTLE INTEREST OR PLEASURE IN DOING THINGS: 0
SUM OF ALL RESPONSES TO PHQ QUESTIONS 1-9: 1
2. FEELING DOWN, DEPRESSED OR HOPELESS: 1
SUM OF ALL RESPONSES TO PHQ QUESTIONS 1-9: 1

## 2022-03-18 NOTE — PATIENT INSTRUCTIONS
Personalized Preventive Plan for Amanda Guerra - 3/18/2022  Medicare offers a range of preventive health benefits. Some of the tests and screenings are paid in full while other may be subject to a deductible, co-insurance, and/or copay. Some of these benefits include a comprehensive review of your medical history including lifestyle, illnesses that may run in your family, and various assessments and screenings as appropriate. After reviewing your medical record and screening and assessments performed today your provider may have ordered immunizations, labs, imaging, and/or referrals for you. A list of these orders (if applicable) as well as your Preventive Care list are included within your After Visit Summary for your review. Other Preventive Recommendations:    · A preventive eye exam performed by an eye specialist is recommended every 1-2 years to screen for glaucoma; cataracts, macular degeneration, and other eye disorders. · A preventive dental visit is recommended every 6 months. · Try to get at least 150 minutes of exercise per week or 10,000 steps per day on a pedometer . · Order or download the FREE \"Exercise & Physical Activity: Your Everyday Guide\" from [employer]. Call 2-825.996.9736 or search [employer] online. · You need 5376-0203 mg of calcium and 6828-0375 IU of vitamin D per day. It is possible to meet your calcium requirement with diet alone, but a vitamin D supplement is usually necessary to meet this goal.  · When exposed to the sun, use a sunscreen that protects against both UVA and UVB radiation with an SPF of 30 or greater. Reapply every 2 to 3 hours or after sweating, drying off with a towel, or swimming. Always wear a seat belt when traveling in a car. Always wear a helmet when riding a bicycle or motorcycle. Patient Education        Well Visit, Women 48 to 72: Care Instructions  Overview     Well visits can help you stay healthy. Your doctor has checked your overall health and may have suggested ways to take good care of yourself. Your doctor also may have recommended tests. At home, you can help prevent illness with healthy eating, regular exercise, and other steps. Follow-up care is a key part of your treatment and safety. Be sure to make and go to all appointments, and call your doctor if you are having problems. It's also a good idea to know your test results and keep a list of the medicines you take. How can you care for yourself at home? Get screening tests that you and your doctor decide on. Screening helps find diseases before any symptoms appear. Eat healthy foods. Choose fruits, vegetables, whole grains, protein, and low-fat dairy foods. Limit fat, especially saturated fat. Reduce salt in your diet. Limit alcohol. Have no more than 1 drink a day or 7 drinks a week. Get at least 30 minutes of exercise on most days of the week. Walking is a good choice. You also may want to do other activities, such as running, swimming, cycling, or playing tennis or team sports. Reach and stay at a healthy weight. This will lower your risk for many problems, such as obesity, diabetes, heart disease, and high blood pressure. Do not smoke. Smoking can make health problems worse. If you need help quitting, talk to your doctor about stop-smoking programs and medicines. These can increase your chances of quitting for good. Care for your mental health. It is easy to get weighed down by worry and stress. Learn strategies to manage stress, like deep breathing and mindfulness, and stay connected with your family and community. If you find you often feel sad or hopeless, talk with your doctor. Treatment can help. Talk to your doctor about whether you have any risk factors for sexually transmitted infections (STIs). You can help prevent STIs if you wait to have sex with a new partner (or partners) until you've each been tested for STIs.  It also helps if you use condoms (male or female condoms) and if you limit your sex partners to one person who only has sex with you. Vaccines are available for some STIs. If you think you may have a problem with alcohol or drug use, talk to your doctor. This includes prescription medicines (such as amphetamines and opioids) and illegal drugs (such as cocaine and methamphetamine). Your doctor can help you figure out what type of treatment is best for you. Protect your skin from too much sun. When you're outdoors from 10 a.m. to 4 p.m., stay in the shade or cover up with clothing and a hat with a wide brim. Wear sunglasses that block UV rays. Even when it's cloudy, put broad-spectrum sunscreen (SPF 30 or higher) on any exposed skin. See a dentist one or two times a year for checkups and to have your teeth cleaned. Wear a seat belt in the car. When should you call for help? Watch closely for changes in your health, and be sure to contact your doctor if you have any problems or symptoms that concern you. Where can you learn more? Go to https://Logicalware.healthConnexity. org and sign in to your [employer] account. Enter K576 in the [employer] box to learn more about \"Well Visit, Women 50 to 72: Care Instructions. \"     If you do not have an account, please click on the \"Sign Up Now\" link. Current as of: October 6, 2021               Content Version: 13.1  © 9461-0381 Healthwise, Incorporated. Care instructions adapted under license by [facility] If you have questions about a medical condition or this instruction, always ask your healthcare professional. [place] [address] any warranty or liability for your use of this information. Patient Education        Eating Healthy Foods: Care Instructions  Your Care Instructions     Eating healthy foods can help lower your risk for disease.  Healthy food gives you energy and keeps your heart strong, your brain active, your muscles working, and your bones strong. A healthy diet includes a variety of foods from the basic food groups: grains, vegetables, fruits, milk and milk products, and meat and beans. Some people may eat more of their favorite foods from only one food group and, as a result, miss getting the nutrients they need. So, it is important to pay attention not only to what you eat but also to what you are missing from your diet. You can eat a healthy, balanced diet by making a few small changes. Follow-up care is a key part of your treatment and safety. Be sure to make and go to all appointments, and call your doctor if you are having problems. It's also a good idea to know your test results and keep a list of the medicines you take. How can you care for yourself at home? Look at what you eat  Keep a food diary for a week or two and record everything you eat or drink. Track the number of servings you eat from each food group. For a balanced diet every day, eat a variety of:  6 or more ounce-equivalents of grains, such as cereals, breads, crackers, rice, or pasta, every day. An ounce-equivalent is 1 slice of bread, 1 cup of ready-to-eat cereal, or ½ cup of cooked rice, cooked pasta, or cooked cereal.  2½ cups of vegetables, especially:  Dark-green vegetables such as broccoli and spinach. Orange vegetables such as carrots and sweet potatoes. Dry beans (such as kitchen and kidney beans) and peas (such as lentils). 2 cups of fresh, frozen, or canned fruit. A small apple or 1 banana or orange equals 1 cup.  3 cups of nonfat or low-fat milk, yogurt, or other milk products. 5½ ounces of meat and beans, such as chicken, fish, lean meat, beans, nuts, and seeds. One egg, 1 tablespoon of peanut butter, ½ ounce nuts or seeds, or ¼ cup of cooked beans equals 1 ounce of meat. Learn how to read food labels for serving sizes and ingredients. Fast-food and convenience-food meals often contain few or no fruits or vegetables.  Make sure you eat some fruits and vegetables to make the meal more nutritious. Look at your food diary. For each food group, add up what you have eaten and then divide the total by the number of days. This will give you an idea of how much you are eating from each food group. See if you can find some ways to change your diet to make it more healthy. Start small  Do not try to make dramatic changes to your diet all at once. You might feel that you are missing out on your favorite foods and then be more likely to fail. Start slowly, and gradually change your habits. Try some of the following:  Use whole wheat bread instead of white bread. Use nonfat or low-fat milk instead of whole milk. Eat brown rice instead of white rice, and eat whole wheat pasta instead of white-flour pasta. Try low-fat cheeses and low-fat yogurt. Add more fruits and vegetables to meals and have them for snacks. Add lettuce, tomato, cucumber, and onion to sandwiches. Add fruit to yogurt and cereal.  Enjoy food  You can still eat your favorite foods. You just may need to eat less of them. If your favorite foods are high in fat, salt, and sugar, limit how often you eat them, but do not cut them out entirely. Eat a wide variety of foods. Make healthy choices when eating out  The type of restaurant you choose can help you make healthy choices. Even fast-food chains are now offering more low-fat or healthier choices on the menu. Choose smaller portions, or take half of your meal home. When eating out, try:  A veggie pizza with a whole wheat crust or grilled chicken (instead of sausage or pepperoni). Pasta with roasted vegetables, grilled chicken, or marinara sauce instead of cream sauce. A vegetable wrap or grilled chicken wrap. Broiled or poached food instead of fried or breaded items.   Make healthy choices easy  Buy packaged, prewashed, ready-to-eat fresh vegetables and fruits, such as baby carrots, salad mixes, and chopped or shredded broccoli and cauliflower. Buy packaged, presliced fruits, such as melon or pineapple. Choose 100% fruit or vegetable juice instead of soda. Limit juice intake to 4 to 6 oz (½ to ¾ cup) a day. Blend low-fat yogurt, fruit juice, and canned or frozen fruit to make a smoothie for breakfast or a snack. Where can you learn more? Go to https://TelsimapeAgility Design Solutionseb.[employer]. org and sign in to your [employer] account. Enter H523 in the [employer] box to learn more about \"Eating Healthy Foods: Care Instructions. \"     If you do not have an account, please click on the \"Sign Up Now\" link. Current as of: September 8, 2021               Content Version: 13.1  © 5548-5923 Healthwise, Incorporated. Care instructions adapted under license by [facility] ([facility]). If you have questions about a medical condition or this instruction, always ask your healthcare professional. [place] [address] any warranty or liability for your use of this information.

## 2022-03-18 NOTE — PROGRESS NOTES
Medicare Annual Wellness Visit    Clemente Bolton is here for Medicare AWV (Pt here today for 3 month f/u for HTN)    Assessment & Plan   Medicare annual wellness visit, subsequent     Chronic anemia : try colace daily and then try taking the iron tabs again  Chronic constipation: Try taking Colace daily or twice a day    Significant spinal and extremity issues see diagnosis list: Try to do leg lifts in her chair  Try to stretch her left hand with her right. Try to stretch  and flex her left elbow  She is to see her neurosurgeon     Recommendations for Preventive Services Due: see orders and patient instructions/AVS.  Recommended screening schedule for the next 5-10 years is provided to the patient in written form: see Patient Instructions/AVS.     Return for Medicare Annual Wellness Visit in 1 year. Subjective   The following acute and/or chronic problems were also addressed today:  anemia  Has constipation issues due to opiates. Hasn't been taking colace  Hasn't been taking iron due to the above. Patient's complete Health Risk Assessment and screening values have been reviewed and are found in Flowsheets. The following problems were reviewed today and where indicated follow up appointments were made and/or referrals ordered. Positive Risk Factor Screenings with Interventions:    Fall Risk:  Do you feel unsteady or are you worried about falling? : (!) yes  2 or more falls in past year?: (!) yes  Fall with injury in past year?: no     Fall Risk Interventions:    · Patient declines any further evaluation/treatment for this issue  · Patient uses walker or wheelchair   · Having a lot of trouble walking,fell inside the house. · Has numbness in legs and left arm  · Gets very spastic muscles in legs. Gets uncontrolled spasms in legs.    Can't use left arm or hand, fingers don't work    Using a spray on the legs : uses topical otc   Having trouble using the walker due the the leg numbness, she has been falling more. She is having trouble doing ADL's left hand and arm not working and weak and legs weak. Spouse/SO has to lift her at times. Has trouble getting trouble getting dressed, going to the BR, going from room to [address] [place] [address]. Physical Exam  Vitals and nursing note reviewed. Constitutional:       General: She is not in acute distress. Appearance: She is well-developed. She is not ill-appearing. HENT:      Head: Normocephalic and atraumatic. Right Ear: External ear normal.      Left Ear: External ear normal.   Eyes:      General: No scleral icterus. Right eye: No discharge. Left eye: No discharge. Conjunctiva/sclera: Conjunctivae normal.      Pupils: Pupils are equal, round, and reactive to light. Neck:      Thyroid: No thyromegaly. Trachea: No tracheal deviation. Cardiovascular:      Rate and Rhythm: Normal rate and regular rhythm. Heart sounds: Normal heart sounds. Pulmonary:      Effort: Pulmonary effort is normal. No respiratory distress. Breath sounds: Normal breath sounds. No wheezing. Musculoskeletal:      Comments: Cannot fully extend either leg. Especially the left. Toes dorsiflexion strength 3 over 5  Left hand is spastic in the fingers and wrist.  She has difficulty extending her wrist.  Has trouble extending her fingers. Left Handgrip is 2-3 over 5. Left elbow range of motion is decreased with flexion and slightly with extension. Left shoulder range of motion is significantly decreased     Lymphadenopathy:      Cervical: No cervical adenopathy. Skin:     General: Skin is warm. Findings: No rash. Neurological:      Mental Status: She is alert and oriented to person, place, and time. Psychiatric:         Mood and Affect: Mood normal.         Behavior: Behavior normal.         Thought Content:  Thought content normal.       ·       Tobacco Use:     Tobacco Use: Medium Risk    Smoking Tobacco Use: Former Smoker    Smokeless Tobacco Use: Never Used     E-Cigarettes/Vaping Use     Questions Responses    E-Cigarette/Vaping Use Current Every Day User    Start Date     Passive Exposure     Quit Date     Counseling Given     Comments Mods or Advanced Personal Vapor[employer]or ()        Substance Abuse - Tobacco Interventions:  Vapin mg nicotine ? .  using it daily    Alcohol Screening:       A score of 8 or more is associated with harmful or hazardous drinking. A score of 13 or more in women, and 15 or more in men, is likely to indicate alcohol dependence.     Substance Abuse - Alcohol Interventions:  drinks on weekend 3 beers Saturday and 3 on         General Health and ACP:  General  In general, how would you say your health is?: (!) Poor  In the past 7 days, have you experienced any of the following: New or Increased Pain, New or Increased Fatigue, Loneliness, Social Isolation, Stress or Anger?: No  Do you get the social and emotional support that you need?: Yes  Do you have a Living Will?: Yes    Advance Directives     Power of [address] Will ACP-Advance Directive ACP-Power of     Not on File Not on File Not on File Not on File      General Health Risk Interventions:  · discussed the situation: severe muscle and spinal issues    Health Habits/Nutrition:     Physical Activity: Inactive    Days of Exercise per Week: 0 days    Minutes of Exercise per Session: 0 min     Have you lost any weight without trying in the past 3 months?: No     Have you seen the dentist within the past year?: (!) No    Health Habits/Nutrition Interventions:  · no teeth, has dentures    Hearing/Vision:  Do you or your family notice any trouble with your hearing that hasn't been managed with hearing aids?: No  Do you have difficulty driving, watching TV, or doing any of your daily activities because of your eyesight?: No  Have you had an eye exam within the past year?: (!) No  No exam data present    Hearing/Vision Interventions:  · Vision concerns:  patient encouraged to make appointment with his/her eye specialist     ADLs:  In the past 7 days, did you need help from others to perform any of the following everyday activities: Eating, dressing, grooming, bathing, toileting, or walking/balance?: (!) Yes  Select all that apply: (!) Walking/Balance,Grooming,Bathing,Toileting,Dressing,Eating  In the past 7 days, did you need help from others to take care of any of the following: Laundry, housekeeping, banking/finances, shopping, telephone use, food preparation, transportation, or taking medications?: (!) Yes  Select all that apply: (!) Taking [place] Preparation,Shopping,Housekeeping,Laundry,Banking/Finances    ADL Interventions:  · needs a wheelchair for home use. · Needs a wheelchair to get from room to room, to help with grooming, getting to the toilet. Going to the kitchen. · If she ends up having troubles using the wheels with her left hand disability she may need an electric wheelchair or scooter. Objective   Vitals:    03/18/22 1051   BP: (!) 166/78   Pulse: 94   SpO2: 98%      There is no height or weight on file to calculate BMI. Patient is unable to stand to do her height or weight. Her legs are too weak and numb           Allergies   Allergen Reactions    Latex Hives, Itching, Dermatitis and Rash    Kiwi Extract      Other reaction(s): Facial Swelling     Prior to Visit Medications    Medication Sig Taking?  Authorizing Provider   ferrous sulfate (IRON 325) 325 (65 Fe) MG tablet Take 1 tablet by mouth 2 times daily (with meals) Yes Jose Antonio Coleman MD   docusate sodium (COLACE) 100 MG capsule Take 2 capsules by mouth 2 times daily Yes Jose Antonio Coleman MD   Potassium Gluconate 595 (99 K) MG TABS TAKE ONE TABLET BY MOUTH DAILY Yes Gurinder Desai MD   pantoprazole (PROTONIX) 40 MG tablet TAKE ONE TABLET BY MOUTH DAILY Yes Gurinder Desai MD   busPIRone (BUSPAR) 30 MG tablet Take 30 mg by mouth 2 times daily Yes Raven Walker MD   levothyroxine (SYNTHROID) 88 MCG tablet Take 1 tablet by mouth Daily Yes Raven Walker MD   dilTIAZem (DILACOR XR) 180 MG extended release capsule Take 1 capsule by mouth daily Yes Raven Walker MD   tiZANidine (ZANAFLEX) 4 MG tablet Take 4 mg by mouth every 12 hours Yes Historical Provider, MD   Vitamin D, Ergocalciferol, 50 MCG (2000 UT) CAPS TAKE ONE CAPSULE BY MOUTH DAILY Yes Matt Molina PA-C   HYDROcodone-acetaminophen (NORCO) 7.5-325 MG per tablet Up to three tablets a day. Yes Historical Provider, MD   methadone (DOLOPHINE) 10 MG tablet Take 10 mg by mouth 2 times daily.  Yes Historical Provider, MD Hansen (Including outside providers/suppliers regularly involved in providing care):   Patient Care Team:  Raven Walker MD as PCP - General (Family Medicine)  Raven Walker MD as PCP - [facility] Empaneled Provider  Raven Walker MD (Family Medicine)  Leila Nails MD as Consulting Physician (Gastroenterology)  eLila Nails MD as Consulting Physician (Gastroenterology)    Reviewed and updated this visit:  Tobacco  Allergies  Meds  Problems  Med Hx  Surg Hx  Soc Hx  Fam Hx

## 2022-03-21 ENCOUNTER — APPOINTMENT (OUTPATIENT)
Dept: PHYSICAL THERAPY | Age: 62
End: 2022-03-21
Payer: MEDICARE

## 2022-03-23 ENCOUNTER — APPOINTMENT (OUTPATIENT)
Dept: PHYSICAL THERAPY | Age: 62
End: 2022-03-23
Payer: MEDICARE

## 2022-03-25 ENCOUNTER — APPOINTMENT (OUTPATIENT)
Dept: PHYSICAL THERAPY | Age: 62
End: 2022-03-25
Payer: MEDICARE

## 2022-03-30 ENCOUNTER — TELEPHONE (OUTPATIENT)
Dept: PRIMARY CARE CLINIC | Age: 62
End: 2022-03-30

## 2022-03-30 NOTE — TELEPHONE ENCOUNTER
[facility]  Fax #[phone]    Patient is requesting wheelchair order, office notes and insurance information be faxed to them.                                                          3

## 2022-04-01 DIAGNOSIS — I10 ESSENTIAL HYPERTENSION: ICD-10-CM

## 2022-04-01 RX ORDER — DILTIAZEM HYDROCHLORIDE 180 MG/1
CAPSULE, EXTENDED RELEASE ORAL
Qty: 30 CAPSULE | Refills: 2 | Status: ON HOLD
Start: 2022-04-01 | End: 2022-04-28 | Stop reason: HOSPADM

## 2022-04-04 ENCOUNTER — TELEPHONE (OUTPATIENT)
Dept: NEUROSURGERY | Age: 62
End: 2022-04-04

## 2022-04-04 NOTE — TELEPHONE ENCOUNTER
Patient called states that she has been very weak and unable to walk since Saturday, needing help to make it too the bathroom. Patient denies any recent falls but states that she can not feel her arms or legs.  She has a appointment (Wednesday) this week but would like to know if she should go to the ER?

## 2022-04-04 NOTE — TELEPHONE ENCOUNTER
If getting worse should go to ed Medical Necessity Statement: Based on my medical judgement, Mohs surgery is the most appropriate treatment for this cancer compared to other treatments.

## 2022-04-06 ENCOUNTER — HOSPITAL ENCOUNTER (OUTPATIENT)
Facility: CLINIC | Age: 62
Discharge: HOME | End: 2022-04-08
Payer: MEDICARE

## 2022-04-06 ENCOUNTER — HOSPITAL ENCOUNTER (OUTPATIENT)
Dept: MRI IMAGING | Facility: CLINIC | Age: 62
Discharge: HOME | End: 2022-04-08
Payer: MEDICARE

## 2022-04-06 ENCOUNTER — HOSPITAL ENCOUNTER (OUTPATIENT)
Dept: GENERAL RADIOLOGY | Facility: CLINIC | Age: 62
Discharge: HOME | End: 2022-04-08
Payer: MEDICARE

## 2022-04-06 ENCOUNTER — OFFICE VISIT (OUTPATIENT)
Dept: NEUROSURGERY | Age: 62
End: 2022-04-06
Payer: MEDICARE

## 2022-04-06 ENCOUNTER — TELEPHONE (OUTPATIENT)
Dept: NEUROSURGERY | Age: 62
End: 2022-04-06

## 2022-04-06 VITALS
WEIGHT: 142 LBS | RESPIRATION RATE: 22 BRPM | TEMPERATURE: 97.9 F | DIASTOLIC BLOOD PRESSURE: 79 MMHG | HEART RATE: 100 BPM | SYSTOLIC BLOOD PRESSURE: 154 MMHG | HEIGHT: 61 IN | BODY MASS INDEX: 26.81 KG/M2

## 2022-04-06 DIAGNOSIS — M48.062 LUMBAR STENOSIS WITH NEUROGENIC CLAUDICATION: ICD-10-CM

## 2022-04-06 DIAGNOSIS — M79.89 LEFT LEG SWELLING: ICD-10-CM

## 2022-04-06 DIAGNOSIS — G95.9 CERVICAL MYELOPATHY (HCC): ICD-10-CM

## 2022-04-06 DIAGNOSIS — G95.9 CERVICAL MYELOPATHY (HCC): Primary | ICD-10-CM

## 2022-04-06 DIAGNOSIS — G81.14 LEFT SPASTIC HEMIPLEGIA (HCC): ICD-10-CM

## 2022-04-06 PROCEDURE — 1036F TOBACCO NON-USER: CPT | Performed by: NEUROLOGICAL SURGERY

## 2022-04-06 PROCEDURE — 3017F COLORECTAL CA SCREEN DOC REV: CPT | Performed by: NEUROLOGICAL SURGERY

## 2022-04-06 PROCEDURE — 99214 OFFICE O/P EST MOD 30 MIN: CPT | Performed by: NEUROLOGICAL SURGERY

## 2022-04-06 PROCEDURE — 72148 MRI LUMBAR SPINE W/O DYE: CPT

## 2022-04-06 PROCEDURE — G8419 CALC BMI OUT NRM PARAM NOF/U: HCPCS | Performed by: NEUROLOGICAL SURGERY

## 2022-04-06 PROCEDURE — 72141 MRI NECK SPINE W/O DYE: CPT

## 2022-04-06 PROCEDURE — 73610 X-RAY EXAM OF ANKLE: CPT

## 2022-04-06 PROCEDURE — G8427 DOCREV CUR MEDS BY ELIG CLIN: HCPCS | Performed by: NEUROLOGICAL SURGERY

## 2022-04-06 NOTE — TELEPHONE ENCOUNTER
I did call and speak with the patient regarding the findings of her MRI cervical and lumbar spine. As seen in the office the patient had significant worsening of left-sided paresis including mainly leg weakness left  and wrist extension weakness as well as progressive neuropathy over her baseline severe deficits from cervical myelomalacia. I reviewed that the MRI of the lumbar spine shows a stable amount of degenerative scoliosis and deformity but otherwise no worsening central disease and actually improved from prior. Unfortunately with the MRI of the cervical spine there has been interval collapse of the 5 vertebral body with a progressive swan-neck deformity and significant cord compression despite prior cervical decompression posteriorly. I explained that this will require decompression along with correction of the spinal deformity. Will require an anterior C5 corpectomy as this is the apex of the kyphosis and a cage placement with plate fixation to correct the kyphosis. Subsequently will have to go posteriorly placed screws and rods from C2 down to T1 with fixation and decompression.

## 2022-04-06 NOTE — PROGRESS NOTES
[address]  [facility] [place] [address] [facility] [address]  Dept: [phone]    Patient:  Charlee Ness  YOB: 1960  Date: 4/6/22    The patient is a 62 y.o. female who presents today for consult of the following problems:     Chief Complaint   Patient presents with    Follow-up             HPI:     Charlee Ness is a 62 y.o. female on whom neurosurgical consultation was requested by Severo Bertin, MD for management of worsening symptoms including weakness of the left leg as well as left upper extremity. She did have a fall recently within the span of the last 3 weeks and states that she has not been able to stand or ambulate since then. Notes significant progression of weakness in the left leg with giving out and worsening numbness. Has had chronic progressive numbness and dexterity issues with the left hand and the left lower extremity. Initially she had been progressing with therapy and started ambulating but unfortunately recently she started regressing and has not been able to ambulate and has been essentially wheelchair-bound. Patient notes that she is had significant worsening function of her left hand since the fall as well as the left leg which is mainly been the new issue. Has been able to stand for brief periods but cannot take any steps the patient cannot lift her left leg off of the ground. Stable moderate saddle anesthesia. Has urge incontinence but no bowel dysfunction.       History:     Past Medical History:   Diagnosis Date    Anxiety     Arthritis     At maximum risk for fall 12/29/2021    caudal equina syndrome and cervical stenosis    Cancer ([address]     right breast    Cauda equina syndrome ([address] 12/29/2021    neuropathy, mobility difficulty, incontinance    Cervical stenosis of spine 12/29/2021    GERD (gastroesophageal reflux disease)     History of stomach ulcers     Hypertension     Hypothyroidism     Lumbar neuritis 9/8/2016    Post laminectomy syndrome 9/8/2016    Spinal deformity 11/2021    cervical and lumbar    Thyroid disease      Past Surgical History:   Procedure Laterality Date    BACK SURGERY      x 2    BREAST SURGERY Right     mastectomy with reconstruction    CERVICAL LAMINECTOMY  11/04/2014    cervicle laminectomy c4-c6    HERNIA REPAIR Bilateral     inguinal    HYSTERECTOMY, TOTAL ABDOMINAL      LUMBAR SPINE SURGERY N/A 12/30/2021    LUMBAR LAMINECTOMY L2-S1 performed by Stuart Roman DO at [facility]       Family History   Problem Relation Age of Onset    Hypertension Mother     Heart Disease Mother     Cancer Mother      Current Outpatient Medications on File Prior to Visit   Medication Sig Dispense Refill    dilTIAZem (DILACOR XR) 180 MG extended release capsule TAKE ONE CAPSULE BY MOUTH DAILY 30 capsule 2    ferrous sulfate (IRON 325) 325 (65 Fe) MG tablet Take 1 tablet by mouth 2 times daily (with meals) 30 tablet 3    docusate sodium (COLACE) 100 MG capsule Take 2 capsules by mouth 2 times daily      Potassium Gluconate 595 (99 K) MG TABS TAKE ONE TABLET BY MOUTH DAILY 90 tablet 0    pantoprazole (PROTONIX) 40 MG tablet TAKE ONE TABLET BY MOUTH DAILY 90 tablet 1    busPIRone (BUSPAR) 30 MG tablet Take 30 mg by mouth 2 times daily 60 tablet 3    tiZANidine (ZANAFLEX) 4 MG tablet Take 4 mg by mouth every 12 hours      Vitamin D, Ergocalciferol, 50 MCG (2000 UT) CAPS TAKE ONE CAPSULE BY MOUTH DAILY 90 capsule 3    HYDROcodone-acetaminophen (NORCO) 7.5-325 MG per tablet Up to three tablets a day.  methadone (DOLOPHINE) 10 MG tablet Take 10 mg by mouth 2 times daily.  levothyroxine (SYNTHROID) 88 MCG tablet Take 1 tablet by mouth Daily 90 tablet 2     No current facility-administered medications on file prior to visit.      Social History     Tobacco Use    Smoking status: Former Smoker     Packs/day: 0.50     Years: 30.00     Pack years: 15.00     Quit date:      Years since quittin.2    Smokeless tobacco: Never Used   Vaping Use    Vaping Use: Every day    Substances: Nicotine   Substance Use Topics    Alcohol use: Yes     Alcohol/week: 0.0 standard drinks     Comment: 3 whiskey drinks per day    Drug use: No       Allergies   Allergen Reactions    Latex Hives, Itching, Dermatitis and Rash    Kiwi Extract      Other reaction(s): Facial Swelling       Review of Systems  ROS: numbness weakness     Physical Exam:      BP (!) 154/79 (Site: Left Upper Arm, Position: Sitting, Cuff Size: Medium Adult)   Pulse 100   Temp 97.9 °F (36.6 °C) (Temporal)   Resp 22   Ht 5' 1\" (1.549 m)   Wt 142 lb (64.4 kg)   BMI 26.83 kg/m²   Estimated body mass index is 26.83 kg/m² as calculated from the following:    Height as of this encounter: 5' 1\" (1.549 m). Weight as of this encounter: 142 lb (64.4 kg). General:  Ian Novak is a 62y.o. year old female who appears her stated age. HEENT: Normocephalic atraumatic. Neck supple. Chest: regular rate; pulses equal. Equal chest rise and fall  Abdomen: Soft nondistended. Ext: DP equal with good capillary refill  Neuro    Mentation  Appropriate affect   oriented    Cranial Nerves:   Pupils equal and reactive to light  Extraocular motion intact  Face symmetric  No dysarthria  v1-3 sensation symmetric, masseter tone symmetric  Hearing symmetric and intact to finger rub    Sensation:   Diffuse loss of sensation of LUE and LLE . Medial 3 digits R hand    Motor  5/5 RLE and RUE. L iliop 2/5, quad 4-, PF 3/5, DF 1/5, L ev 0, L inv 2/5  L WE 4-,  4-, bic tric delt 4/5    Reflexes  Diffusely 3/4 reflexes  Lynn & clonus sustained L    Studies Review:     No new    Assessment and Plan:      1. Cervical myelopathy [address]    2. Lumbar stenosis with neurogenic claudication    3. Left spastic hemiplegia ([facility] [facility]          Plan:   Will obtain MRI CTL and brain to rule out new cord injury. Disp pending results    Followup: No follow-ups on file. Prescriptions Ordered:  No orders of the defined types were placed in this encounter. Orders Placed:  Orders Placed This Encounter   Procedures    MRI CERVICAL SPINE WO CONTRAST     Standing Status:   Future     Standing Expiration Date:   4/6/2023     Order Specific Question:   Reason for exam:     Answer:   eval new cord injury    MRI LUMBAR SPINE WO CONTRAST     Standing Status:   Future     Standing Expiration Date:   4/6/2023     Order Specific Question:   Reason for exam:     Answer:   progressive weakness of LLE    MRI THORACIC SPINE WO CONTRAST     Standing Status:   Future     Standing Expiration Date:   4/6/2023     Order Specific Question:   Reason for exam:     Answer:   progressive weakness of lLE     Order Specific Question:   What is the sedation requirement? Answer:   None    MRI BRAIN WO CONTRAST     Standing Status:   Future     Standing Expiration Date:   4/6/2023     Order Specific Question:   Reason for exam:     Answer:   eval cva     Order Specific Question:   What is the sedation requirement? Answer:   None        Electronically signed by Renetta Mahoney DO on 4/6/2022 at 11:48 AM    Please note that this chart was generated using voice recognition Dragon dictation software. Although every effort was made to ensure the accuracy of this automated transcription, some errors in transcription may have occurred.

## 2022-04-06 NOTE — TELEPHONE ENCOUNTER
radiology called with stat results MRI C Spine      FINDINGS:   BONES/ALIGNMENT: There is loss of the normal cervical lordosis.  There is   grade 1 anterolisthesis of C3 relative to C4 and C4 relative to C5.  There is   multilevel disc desiccation and disc space narrowing, most pronounced at   C4-5, C5-6 and C6-7.  Bone marrow signal intensity is normal.  Multilevel   degenerative endplate changes are unchanged.  There is no acute fracture.       SPINAL CORD: There is abnormal increased T2 signal intensity within the   central aspect of the cervical spinal cord at the C5-6 level.  The cervical   spinal cord is otherwise normal in size and signal intensities.       SOFT TISSUES: No paraspinal mass is identified.       C2-C3: There is no disc bulge or protrusion present.  There is no significant   spinal canal stenosis or neural foraminal narrowing present.       C3-C4: There is a broad posterior disc osteophyte complex, uncovertebral   overgrowth and facet arthropathy.  There is moderate-to-severe spinal canal   stenosis, severe left and moderate right neural foraminal narrowing, not   significantly changed.       C4-C5: The sequelae of multilevel laminectomies is noted.  There is grade 1   anterolisthesis, disc bulge and uncovertebral overgrowth.  There is worsened   severe spinal canal stenosis, severe left and moderate right neural foraminal   narrowing.       C5-C6: The sequelae of bilateral laminectomies is noted.  There is a   posterior disc osteophyte complex and uncovertebral overgrowth.  There is   severe spinal canal stenosis, significantly worsened from the previous exam.   Severe bilateral neural foraminal narrowing is present.       C6-C7: There is a broad posterior disc osteophyte complex and uncovertebral   overgrowth.  There is mild spinal canal stenosis and severe bilateral neural   foraminal narrowing.       C7-T1: There is a disc bulge and uncovertebral overgrowth.  There is moderate   right neural foraminal narrowing.  No significant spinal canal stenosis or   left neural foraminal narrowing is present.           Impression   1. Severe spinal canal stenosis at C4-5 and C5-6, significantly worsened   compared with the previous exam.   2. Intramedullary signal abnormality within the cervical spinal cord at the   C5-6 level consistent with spondylotic myelopathy.    3. Severe multilevel degenerative changes, as described above with multilevel   neural foraminal narrowing, severe at multiple levels, similar to the   previous exam.

## 2022-04-06 NOTE — TELEPHONE ENCOUNTER
Radiology called again regarding results of L Spine     FINDINGS:   BONES/ALIGNMENT: Levoscoliosis centered at L2.  Compensatory dextrocurvature   at L4-5.  Vertebral heights are normal.  Modic type 1 endplate changes at   K9-9.  Retrolisthesis of L1 on L2, L2 on L3.  Grade 1 anterolisthesis of L4   on L5.       SPINAL CORD: The conus terminates normally.       SOFT TISSUES: There are postoperative changes in the dorsal paraspinal soft   tissues.       T12-L1: Central disc protrusion causes mild thecal sac stenosis.       L1-L2: Disc bulge, facet and ligament flavum hypertrophy cause mild thecal   sac and mild bilateral foraminal stenosis.       L2-L3: Laminectomies.  Disc bulge and facet hypertrophy cause mild thecal sac   and mild bilateral foraminal stenosis.       L3-L4: Laminectomies.  Disc bulge and facet hypertrophy cause moderate right   foraminal and mild left foraminal stenosis.       L4-L5: Laminectomies.  Anterolisthesis, disc bulge and facet hypertrophy   cause mild right and severe left foraminal stenosis.       L5-S1: Disc bulge and facet hypertrophy cause mild bilateral foraminal   stenosis.         Impression   Levoscoliosis and moderate to severe degenerative disc disease worst at L4-5   where there is severe left foraminal stenosis.

## 2022-04-11 ENCOUNTER — HOSPITAL ENCOUNTER (INPATIENT)
Age: 62
LOS: 4 days | Discharge: NURSING, REHAB OR LONG-TERM CARE | DRG: 471 | End: 2022-04-15
Attending: NEUROLOGICAL SURGERY | Admitting: NEUROLOGICAL SURGERY
Payer: MEDICARE

## 2022-04-11 ENCOUNTER — ANESTHESIA EVENT (OUTPATIENT)
Dept: OPERATING ROOM | Age: 62
DRG: 471 | End: 2022-04-11
Payer: MEDICARE

## 2022-04-11 ENCOUNTER — APPOINTMENT (OUTPATIENT)
Dept: GENERAL RADIOLOGY | Age: 62
DRG: 471 | End: 2022-04-11
Attending: NEUROLOGICAL SURGERY
Payer: MEDICARE

## 2022-04-11 DIAGNOSIS — M53.2X2 CERVICAL SPINE INSTABILITY: Primary | ICD-10-CM

## 2022-04-11 PROBLEM — G81.90 HEMIPLEGIA (HCC): Status: ACTIVE | Noted: 2022-04-11

## 2022-04-11 PROBLEM — M54.9 BACK PAIN: Status: ACTIVE | Noted: 2022-04-11

## 2022-04-11 LAB
-: ABNORMAL
ABSOLUTE EOS #: 0.04 K/UL (ref 0–0.44)
ABSOLUTE IMMATURE GRANULOCYTE: 0.03 K/UL (ref 0–0.3)
ABSOLUTE LYMPH #: 1 K/UL (ref 1.1–3.7)
ABSOLUTE MONO #: 0.47 K/UL (ref 0.1–1.2)
ALBUMIN SERPL-MCNC: 4.3 G/DL (ref 3.5–5.2)
ALBUMIN/GLOBULIN RATIO: 1.3 (ref 1–2.5)
ALP BLD-CCNC: 94 U/L (ref 35–104)
ALT SERPL-CCNC: 13 U/L (ref 5–33)
ANION GAP SERPL CALCULATED.3IONS-SCNC: 11 MMOL/L (ref 9–17)
AST SERPL-CCNC: 25 U/L
BACTERIA: ABNORMAL
BASOPHILS # BLD: 1 % (ref 0–2)
BASOPHILS ABSOLUTE: 0.06 K/UL (ref 0–0.2)
BILIRUB SERPL-MCNC: 0.33 MG/DL (ref 0.3–1.2)
BILIRUBIN URINE: NEGATIVE
BUN BLDV-MCNC: 7 MG/DL (ref 8–23)
CALCIUM SERPL-MCNC: 9.1 MG/DL (ref 8.6–10.4)
CHLORIDE BLD-SCNC: 97 MMOL/L (ref 98–107)
CO2: 23 MMOL/L (ref 20–31)
COLOR: YELLOW
CREAT SERPL-MCNC: 0.62 MG/DL (ref 0.5–0.9)
EOSINOPHILS RELATIVE PERCENT: 1 % (ref 1–4)
EPITHELIAL CELLS UA: ABNORMAL /HPF (ref 0–5)
GFR AFRICAN AMERICAN: >60 ML/MIN
GFR NON-AFRICAN AMERICAN: >60 ML/MIN
GFR SERPL CREATININE-BSD FRML MDRD: ABNORMAL ML/MIN/{1.73_M2}
GLUCOSE BLD-MCNC: 88 MG/DL (ref 70–99)
GLUCOSE URINE: NEGATIVE
HCT VFR BLD CALC: 32.9 % (ref 36.3–47.1)
HEMOGLOBIN: 10.6 G/DL (ref 11.9–15.1)
IMMATURE GRANULOCYTES: 1 %
INR BLD: 1
KETONES, URINE: NEGATIVE
LEUKOCYTE ESTERASE, URINE: NEGATIVE
LYMPHOCYTES # BLD: 16 % (ref 24–43)
MCH RBC QN AUTO: 28 PG (ref 25.2–33.5)
MCHC RBC AUTO-ENTMCNC: 32.2 G/DL (ref 28.4–34.8)
MCV RBC AUTO: 86.8 FL (ref 82.6–102.9)
MONOCYTES # BLD: 7 % (ref 3–12)
NITRITE, URINE: NEGATIVE
NRBC AUTOMATED: 0 PER 100 WBC
PARTIAL THROMBOPLASTIN TIME: 22.9 SEC (ref 20.5–30.5)
PDW BLD-RTO: 16.3 % (ref 11.8–14.4)
PH UA: 6 (ref 5–8)
PLATELET # BLD: 303 K/UL (ref 138–453)
PMV BLD AUTO: 9.3 FL (ref 8.1–13.5)
POTASSIUM SERPL-SCNC: 4.1 MMOL/L (ref 3.7–5.3)
PROTEIN UA: NEGATIVE
PROTHROMBIN TIME: 11 SEC (ref 9.1–12.3)
RBC # BLD: 3.79 M/UL (ref 3.95–5.11)
RBC # BLD: ABNORMAL 10*6/UL
RBC UA: ABNORMAL /HPF (ref 0–4)
SARS-COV-2, RAPID: NOT DETECTED
SEG NEUTROPHILS: 74 % (ref 36–65)
SEGMENTED NEUTROPHILS ABSOLUTE COUNT: 4.72 K/UL (ref 1.5–8.1)
SODIUM BLD-SCNC: 131 MMOL/L (ref 135–144)
SPECIFIC GRAVITY UA: 1.01 (ref 1–1.03)
SPECIMEN DESCRIPTION: NORMAL
TOTAL PROTEIN: 7.6 G/DL (ref 6.4–8.3)
TURBIDITY: CLEAR
URINE HGB: NEGATIVE
UROBILINOGEN, URINE: NORMAL
WBC # BLD: 6.3 K/UL (ref 3.5–11.3)
WBC UA: ABNORMAL /HPF (ref 0–5)

## 2022-04-11 PROCEDURE — 81001 URINALYSIS AUTO W/SCOPE: CPT

## 2022-04-11 PROCEDURE — 80053 COMPREHEN METABOLIC PANEL: CPT

## 2022-04-11 PROCEDURE — 2580000003 HC RX 258: Performed by: STUDENT IN AN ORGANIZED HEALTH CARE EDUCATION/TRAINING PROGRAM

## 2022-04-11 PROCEDURE — 85610 PROTHROMBIN TIME: CPT

## 2022-04-11 PROCEDURE — 85025 COMPLETE CBC W/AUTO DIFF WBC: CPT

## 2022-04-11 PROCEDURE — 6370000000 HC RX 637 (ALT 250 FOR IP): Performed by: STUDENT IN AN ORGANIZED HEALTH CARE EDUCATION/TRAINING PROGRAM

## 2022-04-11 PROCEDURE — 85730 THROMBOPLASTIN TIME PARTIAL: CPT

## 2022-04-11 PROCEDURE — 86900 BLOOD TYPING SEROLOGIC ABO: CPT

## 2022-04-11 PROCEDURE — 93005 ELECTROCARDIOGRAM TRACING: CPT | Performed by: STUDENT IN AN ORGANIZED HEALTH CARE EDUCATION/TRAINING PROGRAM

## 2022-04-11 PROCEDURE — 2060000000 HC ICU INTERMEDIATE R&B

## 2022-04-11 PROCEDURE — 86901 BLOOD TYPING SEROLOGIC RH(D): CPT

## 2022-04-11 PROCEDURE — 86850 RBC ANTIBODY SCREEN: CPT

## 2022-04-11 PROCEDURE — 87635 SARS-COV-2 COVID-19 AMP PRB: CPT

## 2022-04-11 PROCEDURE — 36415 COLL VENOUS BLD VENIPUNCTURE: CPT

## 2022-04-11 PROCEDURE — 86920 COMPATIBILITY TEST SPIN: CPT

## 2022-04-11 RX ORDER — SODIUM CHLORIDE 0.9 % (FLUSH) 0.9 %
5-40 SYRINGE (ML) INJECTION PRN
Status: DISCONTINUED | OUTPATIENT
Start: 2022-04-11 | End: 2022-04-12 | Stop reason: HOSPADM

## 2022-04-11 RX ORDER — OXYCODONE HYDROCHLORIDE 5 MG/1
5 TABLET ORAL EVERY 4 HOURS PRN
Status: DISCONTINUED | OUTPATIENT
Start: 2022-04-11 | End: 2022-04-15 | Stop reason: HOSPADM

## 2022-04-11 RX ORDER — SODIUM CHLORIDE 9 MG/ML
25 INJECTION, SOLUTION INTRAVENOUS PRN
Status: DISCONTINUED | OUTPATIENT
Start: 2022-04-11 | End: 2022-04-12 | Stop reason: HOSPADM

## 2022-04-11 RX ORDER — VITAMIN B COMPLEX
50 TABLET ORAL DAILY
Status: DISCONTINUED | OUTPATIENT
Start: 2022-04-12 | End: 2022-04-13

## 2022-04-11 RX ORDER — ACETAMINOPHEN 325 MG/1
650 TABLET ORAL EVERY 4 HOURS PRN
Status: DISCONTINUED | OUTPATIENT
Start: 2022-04-11 | End: 2022-04-15 | Stop reason: HOSPADM

## 2022-04-11 RX ORDER — DOCUSATE SODIUM 100 MG/1
200 CAPSULE, LIQUID FILLED ORAL 2 TIMES DAILY
Status: DISCONTINUED | OUTPATIENT
Start: 2022-04-11 | End: 2022-04-15 | Stop reason: HOSPADM

## 2022-04-11 RX ORDER — PANTOPRAZOLE SODIUM 40 MG/1
40 TABLET, DELAYED RELEASE ORAL DAILY
Status: DISCONTINUED | OUTPATIENT
Start: 2022-04-12 | End: 2022-04-15 | Stop reason: HOSPADM

## 2022-04-11 RX ORDER — TIZANIDINE 4 MG/1
4 TABLET ORAL EVERY 12 HOURS PRN
Status: DISCONTINUED | OUTPATIENT
Start: 2022-04-11 | End: 2022-04-14

## 2022-04-11 RX ORDER — SODIUM CHLORIDE 9 MG/ML
INJECTION, SOLUTION INTRAVENOUS PRN
Status: DISCONTINUED | OUTPATIENT
Start: 2022-04-11 | End: 2022-04-12

## 2022-04-11 RX ORDER — ONDANSETRON 4 MG/1
4 TABLET, ORALLY DISINTEGRATING ORAL EVERY 8 HOURS PRN
Status: DISCONTINUED | OUTPATIENT
Start: 2022-04-11 | End: 2022-04-15 | Stop reason: HOSPADM

## 2022-04-11 RX ORDER — LEVOTHYROXINE SODIUM 88 UG/1
88 TABLET ORAL DAILY
Status: DISCONTINUED | OUTPATIENT
Start: 2022-04-12 | End: 2022-04-15 | Stop reason: HOSPADM

## 2022-04-11 RX ORDER — LANOLIN ALCOHOL/MO/W.PET/CERES
325 CREAM (GRAM) TOPICAL 2 TIMES DAILY WITH MEALS
Status: DISCONTINUED | OUTPATIENT
Start: 2022-04-11 | End: 2022-04-15 | Stop reason: HOSPADM

## 2022-04-11 RX ORDER — BUSPIRONE HYDROCHLORIDE 15 MG/1
30 TABLET ORAL 2 TIMES DAILY
Status: DISCONTINUED | OUTPATIENT
Start: 2022-04-11 | End: 2022-04-15 | Stop reason: HOSPADM

## 2022-04-11 RX ORDER — FENTANYL CITRATE 50 UG/ML
25 INJECTION, SOLUTION INTRAMUSCULAR; INTRAVENOUS
Status: DISCONTINUED | OUTPATIENT
Start: 2022-04-11 | End: 2022-04-12

## 2022-04-11 RX ORDER — FENTANYL CITRATE 50 UG/ML
50 INJECTION, SOLUTION INTRAMUSCULAR; INTRAVENOUS
Status: DISCONTINUED | OUTPATIENT
Start: 2022-04-11 | End: 2022-04-12

## 2022-04-11 RX ORDER — SODIUM CHLORIDE 0.9 % (FLUSH) 0.9 %
5-40 SYRINGE (ML) INJECTION EVERY 12 HOURS SCHEDULED
Status: DISCONTINUED | OUTPATIENT
Start: 2022-04-11 | End: 2022-04-12 | Stop reason: HOSPADM

## 2022-04-11 RX ORDER — DILTIAZEM HYDROCHLORIDE 180 MG/1
180 CAPSULE, COATED, EXTENDED RELEASE ORAL DAILY
Status: DISCONTINUED | OUTPATIENT
Start: 2022-04-12 | End: 2022-04-15 | Stop reason: HOSPADM

## 2022-04-11 RX ORDER — ONDANSETRON 2 MG/ML
4 INJECTION INTRAMUSCULAR; INTRAVENOUS EVERY 6 HOURS PRN
Status: DISCONTINUED | OUTPATIENT
Start: 2022-04-11 | End: 2022-04-15 | Stop reason: HOSPADM

## 2022-04-11 RX ORDER — SODIUM CHLORIDE 9 MG/ML
INJECTION, SOLUTION INTRAVENOUS CONTINUOUS
Status: DISCONTINUED | OUTPATIENT
Start: 2022-04-11 | End: 2022-04-15 | Stop reason: HOSPADM

## 2022-04-11 RX ORDER — OXYCODONE HYDROCHLORIDE 5 MG/1
10 TABLET ORAL EVERY 4 HOURS PRN
Status: DISCONTINUED | OUTPATIENT
Start: 2022-04-11 | End: 2022-04-15 | Stop reason: HOSPADM

## 2022-04-11 RX ADMIN — TIZANIDINE 4 MG: 4 TABLET ORAL at 21:57

## 2022-04-11 RX ADMIN — SODIUM CHLORIDE, PRESERVATIVE FREE 5 ML: 5 INJECTION INTRAVENOUS at 21:57

## 2022-04-11 RX ADMIN — OXYCODONE 5 MG: 5 TABLET ORAL at 21:39

## 2022-04-11 RX ADMIN — SODIUM CHLORIDE: 9 INJECTION, SOLUTION INTRAVENOUS at 22:31

## 2022-04-11 RX ADMIN — BUSPIRONE HYDROCHLORIDE 30 MG: 15 TABLET ORAL at 21:56

## 2022-04-11 ASSESSMENT — PAIN SCALES - GENERAL: PAINLEVEL_OUTOF10: 10

## 2022-04-12 ENCOUNTER — APPOINTMENT (OUTPATIENT)
Dept: GENERAL RADIOLOGY | Age: 62
DRG: 471 | End: 2022-04-12
Attending: NEUROLOGICAL SURGERY
Payer: MEDICARE

## 2022-04-12 ENCOUNTER — APPOINTMENT (OUTPATIENT)
Dept: CT IMAGING | Age: 62
DRG: 471 | End: 2022-04-12
Attending: NEUROLOGICAL SURGERY
Payer: MEDICARE

## 2022-04-12 ENCOUNTER — ANESTHESIA (OUTPATIENT)
Dept: OPERATING ROOM | Age: 62
DRG: 471 | End: 2022-04-12
Payer: MEDICARE

## 2022-04-12 VITALS — OXYGEN SATURATION: 100 % | SYSTOLIC BLOOD PRESSURE: 163 MMHG | TEMPERATURE: 98.6 F | DIASTOLIC BLOOD PRESSURE: 94 MMHG

## 2022-04-12 LAB
ABSOLUTE EOS #: 0.06 K/UL (ref 0–0.44)
ABSOLUTE IMMATURE GRANULOCYTE: 0.03 K/UL (ref 0–0.3)
ABSOLUTE LYMPH #: 1.09 K/UL (ref 1.1–3.7)
ABSOLUTE MONO #: 0.44 K/UL (ref 0.1–1.2)
ANION GAP SERPL CALCULATED.3IONS-SCNC: 10 MMOL/L (ref 9–17)
BASOPHILS # BLD: 1 % (ref 0–2)
BASOPHILS ABSOLUTE: 0.03 K/UL (ref 0–0.2)
BUN BLDV-MCNC: 6 MG/DL (ref 8–23)
CALCIUM SERPL-MCNC: 7.6 MG/DL (ref 8.6–10.4)
CHLORIDE BLD-SCNC: 105 MMOL/L (ref 98–107)
CO2: 21 MMOL/L (ref 20–31)
CREAT SERPL-MCNC: 0.57 MG/DL (ref 0.5–0.9)
EKG ATRIAL RATE: 95 BPM
EKG P AXIS: 49 DEGREES
EKG P-R INTERVAL: 188 MS
EKG Q-T INTERVAL: 364 MS
EKG QRS DURATION: 66 MS
EKG QTC CALCULATION (BAZETT): 457 MS
EKG R AXIS: 31 DEGREES
EKG T AXIS: 19 DEGREES
EKG VENTRICULAR RATE: 95 BPM
EOSINOPHILS RELATIVE PERCENT: 1 % (ref 1–4)
GFR AFRICAN AMERICAN: >60 ML/MIN
GFR NON-AFRICAN AMERICAN: >60 ML/MIN
GFR SERPL CREATININE-BSD FRML MDRD: ABNORMAL ML/MIN/{1.73_M2}
GLUCOSE BLD-MCNC: 78 MG/DL (ref 70–99)
HCT VFR BLD CALC: 30.8 % (ref 36.3–47.1)
HEMOGLOBIN: 9.8 G/DL (ref 11.9–15.1)
IMMATURE GRANULOCYTES: 1 %
INR BLD: 1.1
LYMPHOCYTES # BLD: 23 % (ref 24–43)
MCH RBC QN AUTO: 28.2 PG (ref 25.2–33.5)
MCHC RBC AUTO-ENTMCNC: 31.8 G/DL (ref 28.4–34.8)
MCV RBC AUTO: 88.5 FL (ref 82.6–102.9)
MONOCYTES # BLD: 9 % (ref 3–12)
NRBC AUTOMATED: 0 PER 100 WBC
PARTIAL THROMBOPLASTIN TIME: 23.6 SEC (ref 20.5–30.5)
PDW BLD-RTO: 16.3 % (ref 11.8–14.4)
PLATELET # BLD: 259 K/UL (ref 138–453)
PMV BLD AUTO: 9.4 FL (ref 8.1–13.5)
POTASSIUM SERPL-SCNC: 3.6 MMOL/L (ref 3.7–5.3)
PROTHROMBIN TIME: 11.6 SEC (ref 9.1–12.3)
RBC # BLD: 3.48 M/UL (ref 3.95–5.11)
RBC # BLD: ABNORMAL 10*6/UL
SEG NEUTROPHILS: 65 % (ref 36–65)
SEGMENTED NEUTROPHILS ABSOLUTE COUNT: 3.15 K/UL (ref 1.5–8.1)
SODIUM BLD-SCNC: 136 MMOL/L (ref 135–144)
WBC # BLD: 4.8 K/UL (ref 3.5–11.3)

## 2022-04-12 PROCEDURE — 6360000004 HC RX CONTRAST MEDICATION: Performed by: NEUROLOGICAL SURGERY

## 2022-04-12 PROCEDURE — 6370000000 HC RX 637 (ALT 250 FOR IP): Performed by: NURSE PRACTITIONER

## 2022-04-12 PROCEDURE — 3600000004 HC SURGERY LEVEL 4 BASE: Performed by: NEUROLOGICAL SURGERY

## 2022-04-12 PROCEDURE — 00QT0ZZ REPAIR SPINAL MENINGES, OPEN APPROACH: ICD-10-PCS | Performed by: NEUROLOGICAL SURGERY

## 2022-04-12 PROCEDURE — 2780000010 HC IMPLANT OTHER: Performed by: NEUROLOGICAL SURGERY

## 2022-04-12 PROCEDURE — 6360000002 HC RX W HCPCS: Performed by: ANESTHESIOLOGY

## 2022-04-12 PROCEDURE — 8E09XBF COMPUTER ASSISTED PROCEDURE OF HEAD AND NECK REGION, WITH FLUOROSCOPY: ICD-10-PCS | Performed by: NEUROLOGICAL SURGERY

## 2022-04-12 PROCEDURE — 6360000002 HC RX W HCPCS: Performed by: NURSE PRACTITIONER

## 2022-04-12 PROCEDURE — 2500000003 HC RX 250 WO HCPCS

## 2022-04-12 PROCEDURE — 2709999900 HC NON-CHARGEABLE SUPPLY: Performed by: NEUROLOGICAL SURGERY

## 2022-04-12 PROCEDURE — 22554 ARTHRD ANT NTRBD MIN DSC CRV: CPT | Performed by: NEUROLOGICAL SURGERY

## 2022-04-12 PROCEDURE — 3700000000 HC ANESTHESIA ATTENDED CARE: Performed by: NEUROLOGICAL SURGERY

## 2022-04-12 PROCEDURE — 0PB30ZZ EXCISION OF CERVICAL VERTEBRA, OPEN APPROACH: ICD-10-PCS | Performed by: NEUROLOGICAL SURGERY

## 2022-04-12 PROCEDURE — 6370000000 HC RX 637 (ALT 250 FOR IP): Performed by: STUDENT IN AN ORGANIZED HEALTH CARE EDUCATION/TRAINING PROGRAM

## 2022-04-12 PROCEDURE — 63081 REMOVE VERT BODY DCMPRN CRVL: CPT | Performed by: NEUROLOGICAL SURGERY

## 2022-04-12 PROCEDURE — 2580000003 HC RX 258

## 2022-04-12 PROCEDURE — 61783 SCAN PROC SPINAL: CPT | Performed by: NEUROLOGICAL SURGERY

## 2022-04-12 PROCEDURE — 3209999900 FLUORO FOR SURGICAL PROCEDURES

## 2022-04-12 PROCEDURE — 7100000001 HC PACU RECOVERY - ADDTL 15 MIN: Performed by: NEUROLOGICAL SURGERY

## 2022-04-12 PROCEDURE — 7100000000 HC PACU RECOVERY - FIRST 15 MIN: Performed by: NEUROLOGICAL SURGERY

## 2022-04-12 PROCEDURE — 22585 ARTHRD ANT NTRBD MIN DSC EA: CPT | Performed by: NEUROLOGICAL SURGERY

## 2022-04-12 PROCEDURE — 85730 THROMBOPLASTIN TIME PARTIAL: CPT

## 2022-04-12 PROCEDURE — 72040 X-RAY EXAM NECK SPINE 2-3 VW: CPT

## 2022-04-12 PROCEDURE — 6360000002 HC RX W HCPCS

## 2022-04-12 PROCEDURE — 22854 INSJ BIOMECHANICAL DEVICE: CPT | Performed by: NEUROLOGICAL SURGERY

## 2022-04-12 PROCEDURE — 36415 COLL VENOUS BLD VENIPUNCTURE: CPT

## 2022-04-12 PROCEDURE — 3700000001 HC ADD 15 MINUTES (ANESTHESIA): Performed by: NEUROLOGICAL SURGERY

## 2022-04-12 PROCEDURE — 71045 X-RAY EXAM CHEST 1 VIEW: CPT

## 2022-04-12 PROCEDURE — 2580000003 HC RX 258: Performed by: NEUROLOGICAL SURGERY

## 2022-04-12 PROCEDURE — 80048 BASIC METABOLIC PNL TOTAL CA: CPT

## 2022-04-12 PROCEDURE — 99222 1ST HOSP IP/OBS MODERATE 55: CPT | Performed by: NEUROLOGICAL SURGERY

## 2022-04-12 PROCEDURE — 6370000000 HC RX 637 (ALT 250 FOR IP): Performed by: NEUROLOGICAL SURGERY

## 2022-04-12 PROCEDURE — 3600000014 HC SURGERY LEVEL 4 ADDTL 15MIN: Performed by: NEUROLOGICAL SURGERY

## 2022-04-12 PROCEDURE — 22842 INSERT SPINE FIXATION DEVICE: CPT | Performed by: NEUROLOGICAL SURGERY

## 2022-04-12 PROCEDURE — 2500000003 HC RX 250 WO HCPCS: Performed by: NEUROLOGICAL SURGERY

## 2022-04-12 PROCEDURE — 87086 URINE CULTURE/COLONY COUNT: CPT

## 2022-04-12 PROCEDURE — C1713 ANCHOR/SCREW BN/BN,TIS/BN: HCPCS | Performed by: NEUROLOGICAL SURGERY

## 2022-04-12 PROCEDURE — 2060000000 HC ICU INTERMEDIATE R&B

## 2022-04-12 PROCEDURE — 2W62X0Z TRACTION OF NECK USING TRACTION APPARATUS: ICD-10-PCS | Performed by: NEUROLOGICAL SURGERY

## 2022-04-12 PROCEDURE — 63015 REMOVE SPINE LAMINA >2 CRVCL: CPT | Performed by: NEUROLOGICAL SURGERY

## 2022-04-12 PROCEDURE — 22600 ARTHRD PST TQ 1NTRSPC CRV: CPT | Performed by: NEUROLOGICAL SURGERY

## 2022-04-12 PROCEDURE — 85610 PROTHROMBIN TIME: CPT

## 2022-04-12 PROCEDURE — 87077 CULTURE AEROBIC IDENTIFY: CPT

## 2022-04-12 PROCEDURE — 22845 INSERT SPINE FIXATION DEVICE: CPT | Performed by: NEUROLOGICAL SURGERY

## 2022-04-12 PROCEDURE — 86403 PARTICLE AGGLUT ANTBDY SCRN: CPT

## 2022-04-12 PROCEDURE — 2720000010 HC SURG SUPPLY STERILE: Performed by: NEUROLOGICAL SURGERY

## 2022-04-12 PROCEDURE — 70498 CT ANGIOGRAPHY NECK: CPT

## 2022-04-12 PROCEDURE — 85025 COMPLETE CBC W/AUTO DIFF WBC: CPT

## 2022-04-12 PROCEDURE — 0RG20K1 FUSION OF 2 OR MORE CERVICAL VERTEBRAL JOINTS WITH NONAUTOLOGOUS TISSUE SUBSTITUTE, POSTERIOR APPROACH, POSTERIOR COLUMN, OPEN APPROACH: ICD-10-PCS | Performed by: NEUROLOGICAL SURGERY

## 2022-04-12 PROCEDURE — 22614 ARTHRD PST TQ 1NTRSPC EA ADD: CPT | Performed by: NEUROLOGICAL SURGERY

## 2022-04-12 PROCEDURE — 2580000003 HC RX 258: Performed by: NURSE PRACTITIONER

## 2022-04-12 PROCEDURE — 87186 SC STD MICRODIL/AGAR DIL: CPT

## 2022-04-12 PROCEDURE — L0120 CERV FLEX N/ADJ FOAM PRE OTS: HCPCS | Performed by: NEUROLOGICAL SURGERY

## 2022-04-12 DEVICE — DURAGEN® PLUS DURAL REGENERATION MATRIX, 2 IN X 2 IN (5 CM X 5 CM)
Type: IMPLANTABLE DEVICE | Status: FUNCTIONAL
Brand: DURAGEN® PLUS

## 2022-04-12 DEVICE — SET SCREW 3600215 M6 SETSCREW
Type: IMPLANTABLE DEVICE | Site: SPINE CERVICAL | Status: FUNCTIONAL
Brand: INFINITY™ OCCIPITOCERVICAL UPPER THORACIC SYSTEM

## 2022-04-12 DEVICE — DBM 7509211 MAGNIFUSE 1 X 10CM
Type: IMPLANTABLE DEVICE | Site: SPINE CERVICAL | Status: FUNCTIONAL
Brand: MAGNIFUSE® BONE GRAFT

## 2022-04-12 DEVICE — PLATE 3002037 ZEVO 37MM 2 LVL
Type: IMPLANTABLE DEVICE | Site: SPINE CERVICAL | Status: FUNCTIONAL
Brand: ZEVO™ ANTERIOR CERVICAL PLATE SYSTEM

## 2022-04-12 DEVICE — GRAFT BNE SUB SM 1ML CRYOPRESERVED VIABLE CORT CANC BNE: Type: IMPLANTABLE DEVICE | Site: SPINE CERVICAL | Status: FUNCTIONAL

## 2022-04-12 DEVICE — ROD G7753890 PRE-CUT 3.5MM X 90MM CCM
Type: IMPLANTABLE DEVICE | Site: SPINE CERVICAL | Status: FUNCTIONAL
Brand: VERTEX® RECONSTRUCTION SYSTEM

## 2022-04-12 DEVICE — ENDCAP 4360138 13MM 8 DEGREE
Type: IMPLANTABLE DEVICE | Site: SPINE CERVICAL | Status: FUNCTIONAL
Brand: T2 STRATOSPHERE™ EXPANDABLE CORPECTOMY SYSTEM

## 2022-04-12 RX ORDER — GLYCOPYRROLATE 1 MG/5 ML
SYRINGE (ML) INTRAVENOUS PRN
Status: DISCONTINUED | OUTPATIENT
Start: 2022-04-12 | End: 2022-04-12 | Stop reason: SDUPTHER

## 2022-04-12 RX ORDER — PROPOFOL 10 MG/ML
INJECTION, EMULSION INTRAVENOUS PRN
Status: DISCONTINUED | OUTPATIENT
Start: 2022-04-12 | End: 2022-04-12 | Stop reason: SDUPTHER

## 2022-04-12 RX ORDER — PROPOFOL 10 MG/ML
INJECTION, EMULSION INTRAVENOUS CONTINUOUS PRN
Status: DISCONTINUED | OUTPATIENT
Start: 2022-04-12 | End: 2022-04-12 | Stop reason: SDUPTHER

## 2022-04-12 RX ORDER — SENNA PLUS 8.6 MG/1
1 TABLET ORAL DAILY PRN
Status: DISCONTINUED | OUTPATIENT
Start: 2022-04-12 | End: 2022-04-15 | Stop reason: HOSPADM

## 2022-04-12 RX ORDER — MORPHINE SULFATE 4 MG/ML
4 INJECTION, SOLUTION INTRAMUSCULAR; INTRAVENOUS
Status: DISCONTINUED | OUTPATIENT
Start: 2022-04-12 | End: 2022-04-14

## 2022-04-12 RX ORDER — HYDRALAZINE HYDROCHLORIDE 20 MG/ML
10 INJECTION INTRAMUSCULAR; INTRAVENOUS EVERY 10 MIN PRN
Status: DISCONTINUED | OUTPATIENT
Start: 2022-04-12 | End: 2022-04-12 | Stop reason: HOSPADM

## 2022-04-12 RX ORDER — GINSENG 100 MG
CAPSULE ORAL PRN
Status: DISCONTINUED | OUTPATIENT
Start: 2022-04-12 | End: 2022-04-12 | Stop reason: HOSPADM

## 2022-04-12 RX ORDER — KETAMINE HCL IN NACL, ISO-OSM 100MG/10ML
SYRINGE (ML) INJECTION PRN
Status: DISCONTINUED | OUTPATIENT
Start: 2022-04-12 | End: 2022-04-12 | Stop reason: SDUPTHER

## 2022-04-12 RX ORDER — CYCLOBENZAPRINE HCL 10 MG
10 TABLET ORAL 3 TIMES DAILY
Status: DISCONTINUED | OUTPATIENT
Start: 2022-04-12 | End: 2022-04-14

## 2022-04-12 RX ORDER — MAGNESIUM SULFATE HEPTAHYDRATE 40 MG/ML
INJECTION, SOLUTION INTRAVENOUS PRN
Status: DISCONTINUED | OUTPATIENT
Start: 2022-04-12 | End: 2022-04-12 | Stop reason: SDUPTHER

## 2022-04-12 RX ORDER — HYDRALAZINE HYDROCHLORIDE 20 MG/ML
INJECTION INTRAMUSCULAR; INTRAVENOUS
Status: COMPLETED
Start: 2022-04-12 | End: 2022-04-12

## 2022-04-12 RX ORDER — FENTANYL CITRATE 50 UG/ML
50 INJECTION, SOLUTION INTRAMUSCULAR; INTRAVENOUS EVERY 5 MIN PRN
Status: COMPLETED | OUTPATIENT
Start: 2022-04-12 | End: 2022-04-12

## 2022-04-12 RX ORDER — SODIUM CHLORIDE, SODIUM LACTATE, POTASSIUM CHLORIDE, CALCIUM CHLORIDE 600; 310; 30; 20 MG/100ML; MG/100ML; MG/100ML; MG/100ML
INJECTION, SOLUTION INTRAVENOUS CONTINUOUS PRN
Status: DISCONTINUED | OUTPATIENT
Start: 2022-04-12 | End: 2022-04-12 | Stop reason: SDUPTHER

## 2022-04-12 RX ORDER — DEXAMETHASONE SODIUM PHOSPHATE 10 MG/ML
INJECTION INTRAMUSCULAR; INTRAVENOUS PRN
Status: DISCONTINUED | OUTPATIENT
Start: 2022-04-12 | End: 2022-04-12 | Stop reason: SDUPTHER

## 2022-04-12 RX ORDER — SODIUM CHLORIDE 9 MG/ML
INJECTION, SOLUTION INTRAVENOUS CONTINUOUS
Status: DISCONTINUED | OUTPATIENT
Start: 2022-04-12 | End: 2022-04-12

## 2022-04-12 RX ORDER — FENTANYL CITRATE 50 UG/ML
25 INJECTION, SOLUTION INTRAMUSCULAR; INTRAVENOUS EVERY 5 MIN PRN
Status: DISCONTINUED | OUTPATIENT
Start: 2022-04-12 | End: 2022-04-12 | Stop reason: HOSPADM

## 2022-04-12 RX ORDER — LIDOCAINE HYDROCHLORIDE 10 MG/ML
INJECTION, SOLUTION EPIDURAL; INFILTRATION; INTRACAUDAL; PERINEURAL PRN
Status: DISCONTINUED | OUTPATIENT
Start: 2022-04-12 | End: 2022-04-12 | Stop reason: SDUPTHER

## 2022-04-12 RX ORDER — ONDANSETRON 2 MG/ML
INJECTION INTRAMUSCULAR; INTRAVENOUS PRN
Status: DISCONTINUED | OUTPATIENT
Start: 2022-04-12 | End: 2022-04-12 | Stop reason: SDUPTHER

## 2022-04-12 RX ORDER — MAGNESIUM HYDROXIDE 1200 MG/15ML
LIQUID ORAL CONTINUOUS PRN
Status: COMPLETED | OUTPATIENT
Start: 2022-04-12 | End: 2022-04-12

## 2022-04-12 RX ORDER — SODIUM CHLORIDE 9 MG/ML
INJECTION, SOLUTION INTRAVENOUS PRN
Status: DISCONTINUED | OUTPATIENT
Start: 2022-04-12 | End: 2022-04-12 | Stop reason: HOSPADM

## 2022-04-12 RX ORDER — ROCURONIUM BROMIDE 10 MG/ML
INJECTION, SOLUTION INTRAVENOUS PRN
Status: DISCONTINUED | OUTPATIENT
Start: 2022-04-12 | End: 2022-04-12 | Stop reason: SDUPTHER

## 2022-04-12 RX ORDER — MIDAZOLAM HYDROCHLORIDE 1 MG/ML
INJECTION INTRAMUSCULAR; INTRAVENOUS PRN
Status: DISCONTINUED | OUTPATIENT
Start: 2022-04-12 | End: 2022-04-12 | Stop reason: SDUPTHER

## 2022-04-12 RX ORDER — POLYETHYLENE GLYCOL 3350 17 G/17G
17 POWDER, FOR SOLUTION ORAL DAILY
Status: DISCONTINUED | OUTPATIENT
Start: 2022-04-12 | End: 2022-04-15 | Stop reason: HOSPADM

## 2022-04-12 RX ORDER — SODIUM CHLORIDE 0.9 % (FLUSH) 0.9 %
5-40 SYRINGE (ML) INJECTION PRN
Status: DISCONTINUED | OUTPATIENT
Start: 2022-04-12 | End: 2022-04-12 | Stop reason: HOSPADM

## 2022-04-12 RX ORDER — LABETALOL HYDROCHLORIDE 5 MG/ML
INJECTION, SOLUTION INTRAVENOUS PRN
Status: DISCONTINUED | OUTPATIENT
Start: 2022-04-12 | End: 2022-04-12 | Stop reason: SDUPTHER

## 2022-04-12 RX ORDER — LIDOCAINE HYDROCHLORIDE AND EPINEPHRINE 10; 10 MG/ML; UG/ML
INJECTION, SOLUTION INFILTRATION; PERINEURAL PRN
Status: DISCONTINUED | OUTPATIENT
Start: 2022-04-12 | End: 2022-04-12 | Stop reason: HOSPADM

## 2022-04-12 RX ORDER — ACETAMINOPHEN 325 MG/1
650 TABLET ORAL EVERY 6 HOURS
Status: DISCONTINUED | OUTPATIENT
Start: 2022-04-12 | End: 2022-04-15 | Stop reason: HOSPADM

## 2022-04-12 RX ORDER — SODIUM CHLORIDE 0.9 % (FLUSH) 0.9 %
5-40 SYRINGE (ML) INJECTION EVERY 12 HOURS SCHEDULED
Status: DISCONTINUED | OUTPATIENT
Start: 2022-04-12 | End: 2022-04-12 | Stop reason: HOSPADM

## 2022-04-12 RX ORDER — MORPHINE SULFATE 2 MG/ML
2 INJECTION, SOLUTION INTRAMUSCULAR; INTRAVENOUS
Status: DISCONTINUED | OUTPATIENT
Start: 2022-04-12 | End: 2022-04-14

## 2022-04-12 RX ORDER — FENTANYL CITRATE 50 UG/ML
INJECTION, SOLUTION INTRAMUSCULAR; INTRAVENOUS PRN
Status: DISCONTINUED | OUTPATIENT
Start: 2022-04-12 | End: 2022-04-12 | Stop reason: SDUPTHER

## 2022-04-12 RX ADMIN — CYCLOBENZAPRINE 10 MG: 10 TABLET, FILM COATED ORAL at 21:54

## 2022-04-12 RX ADMIN — REMIFENTANIL HYDROCHLORIDE 0.05 MCG/KG/MIN: 1 INJECTION, POWDER, LYOPHILIZED, FOR SOLUTION INTRAVENOUS at 09:26

## 2022-04-12 RX ADMIN — SODIUM CHLORIDE, SODIUM LACTATE, POTASSIUM CHLORIDE, CALCIUM CHLORIDE: 600; 310; 30; 20 INJECTION, SOLUTION INTRAVENOUS at 17:37

## 2022-04-12 RX ADMIN — PROPOFOL 30 MG: 10 INJECTION, EMULSION INTRAVENOUS at 09:15

## 2022-04-12 RX ADMIN — Medication 10 MG: at 17:10

## 2022-04-12 RX ADMIN — HYDRALAZINE HYDROCHLORIDE 10 MG: 20 INJECTION INTRAMUSCULAR; INTRAVENOUS at 19:18

## 2022-04-12 RX ADMIN — HYDROMORPHONE HYDROCHLORIDE 0.5 MG: 1 INJECTION, SOLUTION INTRAMUSCULAR; INTRAVENOUS; SUBCUTANEOUS at 19:00

## 2022-04-12 RX ADMIN — OXYCODONE 10 MG: 5 TABLET ORAL at 22:43

## 2022-04-12 RX ADMIN — Medication 10 MG: at 11:22

## 2022-04-12 RX ADMIN — REMIFENTANIL HYDROCHLORIDE 0.1 MCG/KG/MIN: 1 INJECTION, POWDER, LYOPHILIZED, FOR SOLUTION INTRAVENOUS at 14:07

## 2022-04-12 RX ADMIN — SODIUM CHLORIDE, POTASSIUM CHLORIDE, SODIUM LACTATE AND CALCIUM CHLORIDE: 600; 310; 30; 20 INJECTION, SOLUTION INTRAVENOUS at 14:24

## 2022-04-12 RX ADMIN — Medication 20 MG: at 10:01

## 2022-04-12 RX ADMIN — ACETAMINOPHEN 650 MG: 325 TABLET ORAL at 21:56

## 2022-04-12 RX ADMIN — PROPOFOL 50 MG: 10 INJECTION, EMULSION INTRAVENOUS at 14:52

## 2022-04-12 RX ADMIN — HYDROMORPHONE HYDROCHLORIDE 0.5 MG: 1 INJECTION, SOLUTION INTRAMUSCULAR; INTRAVENOUS; SUBCUTANEOUS at 19:15

## 2022-04-12 RX ADMIN — PROPOFOL 50 MG: 10 INJECTION, EMULSION INTRAVENOUS at 09:29

## 2022-04-12 RX ADMIN — SODIUM CHLORIDE: 9 INJECTION, SOLUTION INTRAVENOUS at 20:21

## 2022-04-12 RX ADMIN — ROCURONIUM BROMIDE 10 MG: 10 INJECTION INTRAVENOUS at 10:25

## 2022-04-12 RX ADMIN — FENTANYL CITRATE 100 MCG: 50 INJECTION, SOLUTION INTRAMUSCULAR; INTRAVENOUS at 08:47

## 2022-04-12 RX ADMIN — HYDROMORPHONE HYDROCHLORIDE 0.2 MG: 1 INJECTION, SOLUTION INTRAMUSCULAR; INTRAVENOUS; SUBCUTANEOUS at 17:46

## 2022-04-12 RX ADMIN — Medication 5 MG: at 17:56

## 2022-04-12 RX ADMIN — Medication 10 MG: at 13:26

## 2022-04-12 RX ADMIN — MORPHINE SULFATE 4 MG: 4 INJECTION INTRAVENOUS at 20:37

## 2022-04-12 RX ADMIN — PROPOFOL 50 MCG/KG/MIN: 10 INJECTION, EMULSION INTRAVENOUS at 09:26

## 2022-04-12 RX ADMIN — MORPHINE SULFATE 4 MG: 4 INJECTION INTRAVENOUS at 23:57

## 2022-04-12 RX ADMIN — HYDROMORPHONE HYDROCHLORIDE 0.5 MG: 1 INJECTION, SOLUTION INTRAMUSCULAR; INTRAVENOUS; SUBCUTANEOUS at 19:20

## 2022-04-12 RX ADMIN — PROPOFOL 150 MG: 10 INJECTION, EMULSION INTRAVENOUS at 08:47

## 2022-04-12 RX ADMIN — SODIUM CHLORIDE, SODIUM LACTATE, POTASSIUM CHLORIDE, CALCIUM CHLORIDE: 600; 310; 30; 20 INJECTION, SOLUTION INTRAVENOUS at 09:00

## 2022-04-12 RX ADMIN — SODIUM CHLORIDE, POTASSIUM CHLORIDE, SODIUM LACTATE AND CALCIUM CHLORIDE: 600; 310; 30; 20 INJECTION, SOLUTION INTRAVENOUS at 08:53

## 2022-04-12 RX ADMIN — DEXAMETHASONE SODIUM PHOSPHATE 10 MG: 10 INJECTION INTRAMUSCULAR; INTRAVENOUS at 09:29

## 2022-04-12 RX ADMIN — Medication 0.1 MG: at 13:29

## 2022-04-12 RX ADMIN — DOCUSATE SODIUM 200 MG: 100 CAPSULE ORAL at 21:57

## 2022-04-12 RX ADMIN — ROCURONIUM BROMIDE 10 MG: 10 INJECTION INTRAVENOUS at 11:51

## 2022-04-12 RX ADMIN — Medication 2 G: at 13:29

## 2022-04-12 RX ADMIN — MAGNESIUM SULFATE 2000 MG: 2 INJECTION INTRAVENOUS at 10:21

## 2022-04-12 RX ADMIN — ROCURONIUM BROMIDE 20 MG: 10 INJECTION INTRAVENOUS at 11:04

## 2022-04-12 RX ADMIN — ROCURONIUM BROMIDE 20 MG: 10 INJECTION INTRAVENOUS at 09:12

## 2022-04-12 RX ADMIN — Medication 2 G: at 17:29

## 2022-04-12 RX ADMIN — FENTANYL CITRATE 50 MCG: 50 INJECTION, SOLUTION INTRAMUSCULAR; INTRAVENOUS at 18:50

## 2022-04-12 RX ADMIN — PROPOFOL 100 MCG/KG/MIN: 10 INJECTION, EMULSION INTRAVENOUS at 16:24

## 2022-04-12 RX ADMIN — MIDAZOLAM HYDROCHLORIDE 2 MG: 1 INJECTION, SOLUTION INTRAMUSCULAR; INTRAVENOUS at 08:43

## 2022-04-12 RX ADMIN — Medication 10 MG: at 15:18

## 2022-04-12 RX ADMIN — ROCURONIUM BROMIDE 10 MG: 10 INJECTION INTRAVENOUS at 09:30

## 2022-04-12 RX ADMIN — ROCURONIUM BROMIDE 20 MG: 10 INJECTION INTRAVENOUS at 14:46

## 2022-04-12 RX ADMIN — FENTANYL CITRATE 50 MCG: 50 INJECTION, SOLUTION INTRAMUSCULAR; INTRAVENOUS at 18:45

## 2022-04-12 RX ADMIN — PROPOFOL 20 MG: 10 INJECTION, EMULSION INTRAVENOUS at 14:47

## 2022-04-12 RX ADMIN — Medication 2 G: at 09:42

## 2022-04-12 RX ADMIN — BUSPIRONE HYDROCHLORIDE 30 MG: 15 TABLET ORAL at 21:54

## 2022-04-12 RX ADMIN — ONDANSETRON 4 MG: 2 INJECTION INTRAMUSCULAR; INTRAVENOUS at 17:31

## 2022-04-12 RX ADMIN — ROCURONIUM BROMIDE 50 MG: 10 INJECTION INTRAVENOUS at 08:47

## 2022-04-12 RX ADMIN — LIDOCAINE HYDROCHLORIDE 50 MG: 10 INJECTION, SOLUTION EPIDURAL; INFILTRATION; INTRACAUDAL; PERINEURAL at 08:47

## 2022-04-12 RX ADMIN — SODIUM CHLORIDE, POTASSIUM CHLORIDE, SODIUM LACTATE AND CALCIUM CHLORIDE: 600; 310; 30; 20 INJECTION, SOLUTION INTRAVENOUS at 09:50

## 2022-04-12 RX ADMIN — IOPAMIDOL 90 ML: 755 INJECTION, SOLUTION INTRAVENOUS at 03:42

## 2022-04-12 RX ADMIN — ROCURONIUM BROMIDE 10 MG: 10 INJECTION INTRAVENOUS at 12:44

## 2022-04-12 RX ADMIN — PROPOFOL 30 MG: 10 INJECTION, EMULSION INTRAVENOUS at 09:24

## 2022-04-12 RX ADMIN — HYDROMORPHONE HYDROCHLORIDE 0.5 MG: 1 INJECTION, SOLUTION INTRAMUSCULAR; INTRAVENOUS; SUBCUTANEOUS at 18:23

## 2022-04-12 RX ADMIN — Medication 5 MG: at 18:23

## 2022-04-12 RX ADMIN — HYDROMORPHONE HYDROCHLORIDE 0.5 MG: 1 INJECTION, SOLUTION INTRAMUSCULAR; INTRAVENOUS; SUBCUTANEOUS at 19:05

## 2022-04-12 RX ADMIN — PROPOFOL 20 MG: 10 INJECTION, EMULSION INTRAVENOUS at 14:01

## 2022-04-12 RX ADMIN — Medication 20 MG: at 17:51

## 2022-04-12 RX ADMIN — Medication 10 MG: at 16:24

## 2022-04-12 RX ADMIN — PROPOFOL 30 MG: 10 INJECTION, EMULSION INTRAVENOUS at 14:36

## 2022-04-12 RX ADMIN — PROPOFOL 20 MG: 10 INJECTION, EMULSION INTRAVENOUS at 13:57

## 2022-04-12 RX ADMIN — Medication 10 MG: at 12:25

## 2022-04-12 RX ADMIN — OXYCODONE 10 MG: 5 TABLET ORAL at 04:17

## 2022-04-12 RX ADMIN — HYDROMORPHONE HYDROCHLORIDE 0.3 MG: 1 INJECTION, SOLUTION INTRAMUSCULAR; INTRAVENOUS; SUBCUTANEOUS at 17:34

## 2022-04-12 ASSESSMENT — PULMONARY FUNCTION TESTS
PIF_VALUE: 18
PIF_VALUE: 19
PIF_VALUE: 18
PIF_VALUE: 20
PIF_VALUE: 20
PIF_VALUE: 19
PIF_VALUE: 18
PIF_VALUE: 20
PIF_VALUE: 20
PIF_VALUE: 19
PIF_VALUE: 2
PIF_VALUE: 22
PIF_VALUE: 21
PIF_VALUE: 19
PIF_VALUE: 20
PIF_VALUE: 21
PIF_VALUE: 19
PIF_VALUE: 21
PIF_VALUE: 19
PIF_VALUE: 18
PIF_VALUE: 17
PIF_VALUE: 18
PIF_VALUE: 21
PIF_VALUE: 19
PIF_VALUE: 19
PIF_VALUE: 17
PIF_VALUE: 20
PIF_VALUE: 2
PIF_VALUE: 21
PIF_VALUE: 0
PIF_VALUE: 17
PIF_VALUE: 19
PIF_VALUE: 20
PIF_VALUE: 22
PIF_VALUE: 19
PIF_VALUE: 18
PIF_VALUE: 17
PIF_VALUE: 19
PIF_VALUE: 21
PIF_VALUE: 17
PIF_VALUE: 19
PIF_VALUE: 19
PIF_VALUE: 20
PIF_VALUE: 19
PIF_VALUE: 21
PIF_VALUE: 19
PIF_VALUE: 20
PIF_VALUE: 20
PIF_VALUE: 19
PIF_VALUE: 4
PIF_VALUE: 20
PIF_VALUE: 18
PIF_VALUE: 19
PIF_VALUE: 17
PIF_VALUE: 19
PIF_VALUE: 20
PIF_VALUE: 20
PIF_VALUE: 19
PIF_VALUE: 20
PIF_VALUE: 17
PIF_VALUE: 20
PIF_VALUE: 20
PIF_VALUE: 19
PIF_VALUE: 19
PIF_VALUE: 20
PIF_VALUE: 22
PIF_VALUE: 19
PIF_VALUE: 20
PIF_VALUE: 19
PIF_VALUE: 25
PIF_VALUE: 0
PIF_VALUE: 15
PIF_VALUE: 20
PIF_VALUE: 19
PIF_VALUE: 17
PIF_VALUE: 17
PIF_VALUE: 19
PIF_VALUE: 4
PIF_VALUE: 17
PIF_VALUE: 19
PIF_VALUE: 14
PIF_VALUE: 18
PIF_VALUE: 19
PIF_VALUE: 18
PIF_VALUE: 21
PIF_VALUE: 19
PIF_VALUE: 21
PIF_VALUE: 20
PIF_VALUE: 19
PIF_VALUE: 21
PIF_VALUE: 19
PIF_VALUE: 14
PIF_VALUE: 21
PIF_VALUE: 19
PIF_VALUE: 20
PIF_VALUE: 19
PIF_VALUE: 19
PIF_VALUE: 20
PIF_VALUE: 16
PIF_VALUE: 21
PIF_VALUE: 19
PIF_VALUE: 20
PIF_VALUE: 20
PIF_VALUE: 21
PIF_VALUE: 17
PIF_VALUE: 21
PIF_VALUE: 19
PIF_VALUE: 21
PIF_VALUE: 15
PIF_VALUE: 21
PIF_VALUE: 19
PIF_VALUE: 21
PIF_VALUE: 20
PIF_VALUE: 21
PIF_VALUE: 19
PIF_VALUE: 19
PIF_VALUE: 22
PIF_VALUE: 15
PIF_VALUE: 19
PIF_VALUE: 19
PIF_VALUE: 20
PIF_VALUE: 19
PIF_VALUE: 20
PIF_VALUE: 21
PIF_VALUE: 16
PIF_VALUE: 17
PIF_VALUE: 19
PIF_VALUE: 15
PIF_VALUE: 19
PIF_VALUE: 21
PIF_VALUE: 19
PIF_VALUE: 16
PIF_VALUE: 20
PIF_VALUE: 19
PIF_VALUE: 19
PIF_VALUE: 17
PIF_VALUE: 14
PIF_VALUE: 20
PIF_VALUE: 16
PIF_VALUE: 20
PIF_VALUE: 18
PIF_VALUE: 19
PIF_VALUE: 20
PIF_VALUE: 19
PIF_VALUE: 21
PIF_VALUE: 14
PIF_VALUE: 19
PIF_VALUE: 20
PIF_VALUE: 17
PIF_VALUE: 20
PIF_VALUE: 19
PIF_VALUE: 20
PIF_VALUE: 21
PIF_VALUE: 19
PIF_VALUE: 17
PIF_VALUE: 19
PIF_VALUE: 5
PIF_VALUE: 19
PIF_VALUE: 21
PIF_VALUE: 20
PIF_VALUE: 19
PIF_VALUE: 20
PIF_VALUE: 20
PIF_VALUE: 19
PIF_VALUE: 17
PIF_VALUE: 20
PIF_VALUE: 21
PIF_VALUE: 14
PIF_VALUE: 21
PIF_VALUE: 17
PIF_VALUE: 21
PIF_VALUE: 20
PIF_VALUE: 2
PIF_VALUE: 16
PIF_VALUE: 20
PIF_VALUE: 21
PIF_VALUE: 19
PIF_VALUE: 23
PIF_VALUE: 20
PIF_VALUE: 19
PIF_VALUE: 21
PIF_VALUE: 19
PIF_VALUE: 21
PIF_VALUE: 20
PIF_VALUE: 20
PIF_VALUE: 19
PIF_VALUE: 18
PIF_VALUE: 19
PIF_VALUE: 18
PIF_VALUE: 19
PIF_VALUE: 19
PIF_VALUE: 21
PIF_VALUE: 20
PIF_VALUE: 15
PIF_VALUE: 20
PIF_VALUE: 19
PIF_VALUE: 8
PIF_VALUE: 18
PIF_VALUE: 19
PIF_VALUE: 20
PIF_VALUE: 19
PIF_VALUE: 17
PIF_VALUE: 20
PIF_VALUE: 0
PIF_VALUE: 19
PIF_VALUE: 19
PIF_VALUE: 17
PIF_VALUE: 19
PIF_VALUE: 17
PIF_VALUE: 20
PIF_VALUE: 21
PIF_VALUE: 17
PIF_VALUE: 19
PIF_VALUE: 16
PIF_VALUE: 19
PIF_VALUE: 20
PIF_VALUE: 19
PIF_VALUE: 21
PIF_VALUE: 17
PIF_VALUE: 20
PIF_VALUE: 19
PIF_VALUE: 18
PIF_VALUE: 13
PIF_VALUE: 20
PIF_VALUE: 19
PIF_VALUE: 18
PIF_VALUE: 19
PIF_VALUE: 17
PIF_VALUE: 18
PIF_VALUE: 2
PIF_VALUE: 19
PIF_VALUE: 21
PIF_VALUE: 18
PIF_VALUE: 19
PIF_VALUE: 18
PIF_VALUE: 18
PIF_VALUE: 19
PIF_VALUE: 6
PIF_VALUE: 19
PIF_VALUE: 20
PIF_VALUE: 19
PIF_VALUE: 1
PIF_VALUE: 19
PIF_VALUE: 21
PIF_VALUE: 20
PIF_VALUE: 19
PIF_VALUE: 19
PIF_VALUE: 17
PIF_VALUE: 19
PIF_VALUE: 17
PIF_VALUE: 20
PIF_VALUE: 20
PIF_VALUE: 21
PIF_VALUE: 19
PIF_VALUE: 19
PIF_VALUE: 20
PIF_VALUE: 19
PIF_VALUE: 16
PIF_VALUE: 4
PIF_VALUE: 20
PIF_VALUE: 15
PIF_VALUE: 20
PIF_VALUE: 19
PIF_VALUE: 22
PIF_VALUE: 19
PIF_VALUE: 17
PIF_VALUE: 19
PIF_VALUE: 19
PIF_VALUE: 21
PIF_VALUE: 17
PIF_VALUE: 19
PIF_VALUE: 20
PIF_VALUE: 21
PIF_VALUE: 15
PIF_VALUE: 17
PIF_VALUE: 19
PIF_VALUE: 18
PIF_VALUE: 17
PIF_VALUE: 20
PIF_VALUE: 19
PIF_VALUE: 20
PIF_VALUE: 18
PIF_VALUE: 19
PIF_VALUE: 19
PIF_VALUE: 20
PIF_VALUE: 18
PIF_VALUE: 19
PIF_VALUE: 20
PIF_VALUE: 4
PIF_VALUE: 19
PIF_VALUE: 21
PIF_VALUE: 21
PIF_VALUE: 17
PIF_VALUE: 20
PIF_VALUE: 19
PIF_VALUE: 18
PIF_VALUE: 18
PIF_VALUE: 17
PIF_VALUE: 19
PIF_VALUE: 20
PIF_VALUE: 21
PIF_VALUE: 18
PIF_VALUE: 16
PIF_VALUE: 21
PIF_VALUE: 21
PIF_VALUE: 19
PIF_VALUE: 21
PIF_VALUE: 19
PIF_VALUE: 22
PIF_VALUE: 19
PIF_VALUE: 20
PIF_VALUE: 20
PIF_VALUE: 1
PIF_VALUE: 19
PIF_VALUE: 19
PIF_VALUE: 20
PIF_VALUE: 19
PIF_VALUE: 20
PIF_VALUE: 19
PIF_VALUE: 17
PIF_VALUE: 18
PIF_VALUE: 19
PIF_VALUE: 18
PIF_VALUE: 19
PIF_VALUE: 19
PIF_VALUE: 20
PIF_VALUE: 16
PIF_VALUE: 21
PIF_VALUE: 20
PIF_VALUE: 4
PIF_VALUE: 19
PIF_VALUE: 17
PIF_VALUE: 18
PIF_VALUE: 21
PIF_VALUE: 20
PIF_VALUE: 19
PIF_VALUE: 19
PIF_VALUE: 21
PIF_VALUE: 20
PIF_VALUE: 19
PIF_VALUE: 17
PIF_VALUE: 19
PIF_VALUE: 20
PIF_VALUE: 19
PIF_VALUE: 19
PIF_VALUE: 26
PIF_VALUE: 17
PIF_VALUE: 18
PIF_VALUE: 19
PIF_VALUE: 16
PIF_VALUE: 19
PIF_VALUE: 20
PIF_VALUE: 6
PIF_VALUE: 17
PIF_VALUE: 19
PIF_VALUE: 17
PIF_VALUE: 16
PIF_VALUE: 19
PIF_VALUE: 19
PIF_VALUE: 21
PIF_VALUE: 21
PIF_VALUE: 16
PIF_VALUE: 20
PIF_VALUE: 17
PIF_VALUE: 19
PIF_VALUE: 20
PIF_VALUE: 19
PIF_VALUE: 20
PIF_VALUE: 21
PIF_VALUE: 19
PIF_VALUE: 20
PIF_VALUE: 19
PIF_VALUE: 15
PIF_VALUE: 20
PIF_VALUE: 20
PIF_VALUE: 19
PIF_VALUE: 17
PIF_VALUE: 19
PIF_VALUE: 20
PIF_VALUE: 19
PIF_VALUE: 19
PIF_VALUE: 18
PIF_VALUE: 16
PIF_VALUE: 19
PIF_VALUE: 17
PIF_VALUE: 17
PIF_VALUE: 19
PIF_VALUE: 19
PIF_VALUE: 21
PIF_VALUE: 19
PIF_VALUE: 19
PIF_VALUE: 20
PIF_VALUE: 20
PIF_VALUE: 19
PIF_VALUE: 19
PIF_VALUE: 17
PIF_VALUE: 19
PIF_VALUE: 17
PIF_VALUE: 21
PIF_VALUE: 19
PIF_VALUE: 19
PIF_VALUE: 18
PIF_VALUE: 19
PIF_VALUE: 19
PIF_VALUE: 18
PIF_VALUE: 17
PIF_VALUE: 18
PIF_VALUE: 19
PIF_VALUE: 19
PIF_VALUE: 22
PIF_VALUE: 17
PIF_VALUE: 19
PIF_VALUE: 19
PIF_VALUE: 21
PIF_VALUE: 19
PIF_VALUE: 21
PIF_VALUE: 21
PIF_VALUE: 19
PIF_VALUE: 21
PIF_VALUE: 14
PIF_VALUE: 19
PIF_VALUE: 19
PIF_VALUE: 17
PIF_VALUE: 21
PIF_VALUE: 19
PIF_VALUE: 21
PIF_VALUE: 21
PIF_VALUE: 18
PIF_VALUE: 20
PIF_VALUE: 19
PIF_VALUE: 20
PIF_VALUE: 17
PIF_VALUE: 20
PIF_VALUE: 20
PIF_VALUE: 19
PIF_VALUE: 21
PIF_VALUE: 19
PIF_VALUE: 18
PIF_VALUE: 19
PIF_VALUE: 16
PIF_VALUE: 19
PIF_VALUE: 19
PIF_VALUE: 21
PIF_VALUE: 16
PIF_VALUE: 12
PIF_VALUE: 20
PIF_VALUE: 19
PIF_VALUE: 20
PIF_VALUE: 19
PIF_VALUE: 20
PIF_VALUE: 19
PIF_VALUE: 22
PIF_VALUE: 20
PIF_VALUE: 21
PIF_VALUE: 20
PIF_VALUE: 17
PIF_VALUE: 19
PIF_VALUE: 20
PIF_VALUE: 19
PIF_VALUE: 19
PIF_VALUE: 21
PIF_VALUE: 15
PIF_VALUE: 20
PIF_VALUE: 17
PIF_VALUE: 19
PIF_VALUE: 21
PIF_VALUE: 19
PIF_VALUE: 19
PIF_VALUE: 21
PIF_VALUE: 21
PIF_VALUE: 19
PIF_VALUE: 14
PIF_VALUE: 19
PIF_VALUE: 19
PIF_VALUE: 18
PIF_VALUE: 19
PIF_VALUE: 20
PIF_VALUE: 18
PIF_VALUE: 18
PIF_VALUE: 17
PIF_VALUE: 19
PIF_VALUE: 18
PIF_VALUE: 19
PIF_VALUE: 20
PIF_VALUE: 19
PIF_VALUE: 18
PIF_VALUE: 17
PIF_VALUE: 19
PIF_VALUE: 19
PIF_VALUE: 3
PIF_VALUE: 21
PIF_VALUE: 12
PIF_VALUE: 20
PIF_VALUE: 19

## 2022-04-12 ASSESSMENT — PAIN DESCRIPTION - LOCATION
LOCATION: NECK

## 2022-04-12 ASSESSMENT — PAIN DESCRIPTION - FREQUENCY
FREQUENCY: CONTINUOUS
FREQUENCY: CONTINUOUS

## 2022-04-12 ASSESSMENT — PAIN DESCRIPTION - PAIN TYPE
TYPE: CHRONIC PAIN
TYPE: SURGICAL PAIN
TYPE: CHRONIC PAIN
TYPE: SURGICAL PAIN

## 2022-04-12 ASSESSMENT — PAIN SCALES - GENERAL
PAINLEVEL_OUTOF10: 8
PAINLEVEL_OUTOF10: 10
PAINLEVEL_OUTOF10: 10
PAINLEVEL_OUTOF10: 9
PAINLEVEL_OUTOF10: 8
PAINLEVEL_OUTOF10: 7
PAINLEVEL_OUTOF10: 8
PAINLEVEL_OUTOF10: 10
PAINLEVEL_OUTOF10: 8
PAINLEVEL_OUTOF10: 7
PAINLEVEL_OUTOF10: 8
PAINLEVEL_OUTOF10: 10
PAINLEVEL_OUTOF10: 7

## 2022-04-12 ASSESSMENT — PAIN DESCRIPTION - ONSET: ONSET: GRADUAL

## 2022-04-12 ASSESSMENT — PAIN - FUNCTIONAL ASSESSMENT
PAIN_FUNCTIONAL_ASSESSMENT: PREVENTS OR INTERFERES WITH ALL ACTIVE AND SOME PASSIVE ACTIVITIES
PAIN_FUNCTIONAL_ASSESSMENT: 0-10

## 2022-04-12 ASSESSMENT — PAIN DESCRIPTION - DESCRIPTORS
DESCRIPTORS: CONSTANT
DESCRIPTORS: DISCOMFORT

## 2022-04-12 ASSESSMENT — LIFESTYLE VARIABLES: SMOKING_STATUS: 1

## 2022-04-12 NOTE — H&P
[address] [facility]   Neurosurgery History & Physical    Patient Name: Kasandra Sweet  Patient : 1960  Room/Bed: 2149/5214-09  Code Status: Full Code  Allergies: Allergies   Allergen Reactions    Latex Hives, Itching, Dermatitis and Rash    Kiwi Extract      Face swelling, some difficulty breathing       CHIEF COMPLAINT     Hemiplegia    HPI    History Obtained From: Patient, EMR    The patient is a 62 y.o. female presented for operative management of cervical spine swan-neck deformity and cord compression with plan for anterior C5 corpectomy, posterior fixation from C2-T1 with arthrodesis, osteotomy, correction of deformity on  with Dr. Wing Woods. Symptoms include significant left-sided paresis, severe lower extremity weakness (wheelchair bound, now unable to stand or ambulate [id number]), progressively worsening paresthesias and dexterity issues with left upper extremity and left lower extremity. Additionally complains of some moderate saddle anesthesia, urge incontinence but no bowel dysfunction. She has a long history of bilateral lower extremity and upper extremity weakness and numbness which have progressed over the last several months and years. She has a history of chronic back pain since  and has had previous back surgeries in the past, including C3-C4 fusion years ago, as well as lumbar laminectomy L2-S1 for cauda equina decompression by Dr. Wing Woods on 21. Patient had been improving initially with therapy, however overall has regressed. Progressive decline has occurred over the past year. She was initially able to ambulate most places independently and only use a walker as needed, however, she is now wheelchair dependent.  During admission in 2021 for weakness, MRI showed myelomalacia of a prior decompressed region in C spine, spinal deformity with some hyperkyphosis and somewhat swan-neck deformity: Cervical stenosis with myelopathy; Lumbar stenosis with neurogenic claudication. Lumbar spine MRI on  showing stable degenerative scoliosis deformity but no worsening of central disease, overall improved from previous MRI. However, MRI of C-spine showing interval collapse of C5 vertebral body with a progressive swan-neck deformity and significant cord compression, despite prior cervical decompression.          PATIENT HISTORY   Past Medical History:        Diagnosis Date    Anxiety     Arthritis     At maximum risk for fall 2021    caudal equina syndrome and cervical stenosis    Cancer (HCC)     right breast    Cauda equina syndrome ([address] 2021    neuropathy, mobility difficulty, incontinance    Cervical stenosis of spine 2021    GERD (gastroesophageal reflux disease)     History of stomach ulcers     Hypertension     Dr. Jeovanny Geiger    Hypothyroidism     Lumbar neuritis     Post laminectomy syndrome     Spinal deformity 2021    cervical and lumbar    Thyroid disease     Uses wheelchair     Wears dentures     Wellness examination     Dr. Jeovanny Geiger       Past Surgical History:        Procedure Laterality Date    BACK SURGERY      lower back x 3, cervical- x 1: C4- C6, 2014     BREAST SURGERY Right     mastectomy with reconstruction    COLONOSCOPY  about 2018    HERNIA REPAIR Bilateral     inguinal    HYSTERECTOMY, TOTAL ABDOMINAL      LUMBAR SPINE SURGERY N/A 2021    LUMBAR LAMINECTOMY L2-S1 performed by Renetta Mahoney DO at [facility]         Social History:   Social History     Socioeconomic History    Marital status: Single     Spouse name: Not on file    Number of children: Not on file    Years of education: Not on file    Highest education level: Not on file   Occupational History    Not on file   Tobacco Use    Smoking status: Former Smoker     Packs/day: 0.50     Years: 30.00     Pack years: 15.00     Quit date:      Years since quittin.2    Smokeless tobacco: Never Used   Vaping Use    Vaping Use: Every day    Substances: Nicotine   Substance and Sexual Activity    Alcohol use: Yes     Alcohol/week: 0.0 standard drinks     Comment: weekend at times    Drug use: No    Sexual activity: Not on file   Other Topics Concern    Not on file   Social History Narrative    Not on file     Social Determinants of Health     Financial Resource Strain:     Difficulty of Paying Living Expenses: Not on file   Food Insecurity:     Worried About Running Out of Food in the Last Year: Not on file    Rodriguez of Food in the Last Year: Not on file   Transportation Needs:     Lack of Transportation (Medical): Not on file    Lack of Transportation (Non-Medical):  Not on file   Physical Activity: Inactive    Days of Exercise per Week: 0 days    Minutes of Exercise per Session: 0 min   Stress:     Feeling of Stress : Not on file   Social Connections:     Frequency of Communication with Friends and Family: Not on file    Frequency of Social Gatherings with Friends and Family: Not on file    Attends Rastafari Services: Not on file    Active Member of [address] [place] [address] or Organizations: Not on file    Attends Club or Organization Meetings: Not on file    Marital Status: Not on file   Intimate Partner Violence:     Fear of Current or Ex-Partner: Not on file    Emotionally Abused: Not on file    Physically Abused: Not on file    Sexually Abused: Not on file   Housing Stability:     Unable to Pay for Housing in the Last Year: Not on file    Number of [place] in the Last Year: Not on file    Unstable Housing in the Last Year: Not on file       Family History:       Problem Relation Age of Onset    Hypertension Mother     Heart Disease Mother     Cancer Mother        Allergies:    Latex and Kiwi extract    Medications Prior to Admission:    Medications Prior to Admission: dilTIAZem (DILACOR XR) 180 MG extended release capsule, TAKE ONE CAPSULE BY MOUTH DAILY  ferrous sulfate (IRON 325) 325 (65 Fe) MG tablet, Take 1 tablet by mouth 2 times daily (with meals)  docusate sodium (COLACE) 100 MG capsule, Take 2 capsules by mouth 2 times daily  pantoprazole (PROTONIX) 40 MG tablet, TAKE ONE TABLET BY MOUTH DAILY  busPIRone (BUSPAR) 30 MG tablet, Take 30 mg by mouth 2 times daily  levothyroxine (SYNTHROID) 88 MCG tablet, Take 1 tablet by mouth Daily  tiZANidine (ZANAFLEX) 4 MG tablet, Take 4 mg by mouth every 12 hours  Vitamin D, Ergocalciferol, 50 MCG (2000 UT) CAPS, TAKE ONE CAPSULE BY MOUTH DAILY  HYDROcodone-acetaminophen (NORCO) 7.5-325 MG per tablet, Up to three tablets a day. methadone (DOLOPHINE) 10 MG tablet, Take 10 mg by mouth 2 times daily.     Current Medications:  Current Facility-Administered Medications: busPIRone (BUSPAR) tablet 30 mg, 30 mg, Oral, BID  [START ON 4/12/2022] dilTIAZem (CARDIZEM CD) extended release capsule 180 mg, 180 mg, Oral, Daily  docusate sodium (COLACE) capsule 200 mg, 200 mg, Oral, BID  ferrous sulfate (FE TABS 325) EC tablet 325 mg, 325 mg, Oral, BID WC  [START ON 4/12/2022] levothyroxine (SYNTHROID) tablet 88 mcg, 88 mcg, Oral, Daily  [START ON 4/12/2022] pantoprazole (PROTONIX) tablet 40 mg, 40 mg, Oral, Daily  tiZANidine (ZANAFLEX) tablet 4 mg, 4 mg, Oral, Q12H PRN  [START ON 4/12/2022] Vitamin D (CHOLECALCIFEROL) tablet 2,000 Units, 50 mcg, Oral, Daily  sodium chloride flush 0.9 % injection 5-40 mL, 5-40 mL, IntraVENous, 2 times per day  sodium chloride flush 0.9 % injection 5-40 mL, 5-40 mL, IntraVENous, PRN  0.9 % sodium chloride infusion, 25 mL, IntraVENous, PRN  acetaminophen (TYLENOL) tablet 650 mg, 650 mg, Oral, Q4H PRN  ondansetron (ZOFRAN-ODT) disintegrating tablet 4 mg, 4 mg, Oral, Q8H PRN **OR** ondansetron (ZOFRAN) injection 4 mg, 4 mg, IntraVENous, Q6H PRN  oxyCODONE (ROXICODONE) immediate release tablet 5 mg, 5 mg, Oral, Q4H PRN **OR** oxyCODONE (ROXICODONE) immediate release tablet 10 mg, 10 mg, Oral, Q4H PRN  fentaNYL (SUBLIMAZE) injection 25 mcg, 25 mcg, IntraVENous, Q1H PRN **OR** fentaNYL (SUBLIMAZE) injection 50 mcg, 50 mcg, IntraVENous, Q1H PRN  0.9 % sodium chloride infusion, , IntraVENous, Continuous  0.9 % sodium chloride infusion, , IntraVENous, PRN    REVIEW OF SYSTEMS     CONSTITUTIONAL: negative for fatigue   EYES: negative for double vision, blurry vision, and photophobia    HEENT: negative for tinnitus and sore throat   RESPIRATORY: negative for cough, shortness of breath   CARDIOVASCULAR: negative for chest pain, palpitations, or syncope   GASTROINTESTINAL: negative for abdominal pain, nausea, vomiting   GENITOURINARY: negative for incontinence or retention    MUSCULOSKELETAL: negative for neck or back pain, negative for extremity pain   NEUROLOGICAL: Negative for seizures, headaches, +weakness, +numbness, negative confusion, aphasia, dysarthria    PSYCHIATRIC: negative for agitation, hallucination, SI/HI   SKIN Negative for spontaneous contusions, rashes, or lesions      PHYSICAL EXAM:     BP (!) 174/84   Pulse 95   Temp 98.4 °F (36.9 °C) (Oral)   Resp 18     PHYSICAL EXAM:  CONSTITUTIONAL:  Well developed, well nourished, alert and oriented x 3, in no acute distress. GCS 15. Nontoxic. HEAD:  normocephalic, atraumatic    EYES:  PERRLA, EOMI.  Visual acuity and peripheral vision intact b/l   ENT:  moist mucous membranes   NECK:  supple, symmetric   LUNGS:  Equal air entry bilaterally   CARDIOVASCULAR:  normal s1 / s2, RRR, distal pulses intact   ABDOMEN:  Soft, no rigidity   NEUROLOGIC:  Mental Status:  A & O x3,awake             Cranial Nerves:    II: Visual acuity:  normal  II: Visual fields:  normal  III: Pupils:  equal, round, reactive to light  III,IV,VI: Extra Ocular Movements: intact  V: Facial sensation:  intact  VII: Facial strength: intact  VIII: Hearing:  intact  IX: Palate:  intact  XI: Shoulder shrug:  intact  XII: Tongue movement:  normal    Motor Exam:    MOTOR    5/5 plantar flexion of the right ankle  5/5 dorsi flexion of the right ankle  3/5 plantar flexion of the left ankle  1/5 dorsi flexion of the left ankle  5/5 flexion of the right knee  4/5 flexion of the left knee  5/5  strength on the right  4/5  strength on the left  5/5 flexion of the right elbow  4/5 flexion of the left elbow  Shoulder elevation 4/5 L; 5/5 R    Sensory:    Touch:    Right Upper Extremity:  abnormal -loss of sensation at C8 dermatome of right upper extremity  Left Upper Extremity:  abnormal -diffuse loss of sensation  Right Lower Extremity:  normal  Left Lower Extremity:  abnormal -diffuse loss of sensation    Deep Tendon Reflexes:    Right Bicep:  3+  Left Bicep:  3+  Right Knee:  3+  Left Knee:  3+    Clonus:  Present  Lynn present         LABS AND IMAGING:     RECENT LABS:  CBC with Differential:    Lab Results   Component Value Date    WBC 6.3 04/11/2022    RBC 3.79 04/11/2022    HGB 10.6 04/11/2022    HCT 32.9 04/11/2022     04/11/2022    MCV 86.8 04/11/2022    MCH 28.0 04/11/2022    MCHC 32.2 04/11/2022    RDW 16.3 04/11/2022    LYMPHOPCT 16 04/11/2022    MONOPCT 7 04/11/2022    BASOPCT 1 04/11/2022    MONOSABS 0.47 04/11/2022    LYMPHSABS 1.00 04/11/2022    EOSABS 0.04 04/11/2022    BASOSABS 0.06 04/11/2022    DIFFTYPE NOT REPORTED 01/04/2022     BMP:    Lab Results   Component Value Date     04/11/2022    K 4.1 04/11/2022    CL 97 04/11/2022    CO2 23 04/11/2022    BUN 7 04/11/2022    LABALBU 4.3 04/11/2022    CREATININE 0.62 04/11/2022    CALCIUM 9.1 04/11/2022    GFRAA >60 04/11/2022    LABGLOM >60 04/11/2022    GLUCOSE 88 04/11/2022       RADIOLOGY:  XR CHEST PORTABLE    (Results Pending)   CTA NECK W CONTRAST    (Results Pending)   XR CERVICAL SPINE FLEXION AND EXTENSION    (Results Pending)     ASSESSMENT AND PLAN:         ASSESSMENT:     This is a 62 y.o. female with cervical myelopathy secondary to collapse of C5 resulting in swan-neck deformity and compression of spinal cord.   Admitted for operative management, which includes anterior C5 corpectomy, posterior fixation from C2-T1 with arthrodesis, osteotomy, correction of deformity on 4/12.       PLAN/MEDICAL DECISION MAKING:    To OR 4/12   N.p.o. at midnight, sips with meds okay  CTA Neck, C spine flex/ex XR upright ordered 4/11  Preoperative labs: CBC, BMP, coags,  Type and cross 4 units on hold for OR tomorrow  Covid swab preoperatively  Preoperative chest x-ray  Preoperative EKG  Analgesia: Tylenol, Roxicodone, fentanyl as needed      PROPHYLAXIS:  DVT PROPHYLAXIS:  - SCD sleeves - Thigh High         Brianna Reyes MD   Emergency Medicine Resident, PGY-2  Neurosurgery Service  4/11/2022     11:00 PM

## 2022-04-12 NOTE — OP NOTE
Operative Note      Patient: Rochelle Bang  YOB: 1960  MRN: [id number]    Date of Procedure: 4/12/2022    Pre-Op Diagnosis: Cervical stenosis with myelopathy, cervical kyphotic deformity, incomplete quadriplegia    Post-Op Diagnosis: Same       Indications for procedure. Patient with severe critical myelopathy related to progressive stenosis even over the span of the last 3 months. Patient also with clinical progression including worsening left-sided hemiparesis and loss of sensation. Patient with prior posterior cervical decompression without fixation and resultant significant kyphotic deformity with spinal cord draped over the ventral portion of the vertebral bodies. Extensive discussion with the patient regarding the findings on imaging as well as clinical exam.  Patient with significant myelopathy that is more so related to the progressive kyphotic deformity versus actual central stenosis. Surgical plan discussed with the patient that will be a combination of direct surgical decompression as well as reduction of the translational and sagittal plane deformity. Patient to have resection of the apex with C5 corpectomy and lordotic cage implantation and fixation followed by posterior fixation.     Procedure    Part 1  C5 corpectomy and anterior arthrodesis of C4 and C6 endplates  Implantation of titanium expandable Medtronic cage  Open reduction of cervical kyphotic deformity  Anterior fixation with titanium plate and screws  Use of operative microscope  Use of fluoroscopy  Use of morselized autograft via same incision  Use of morselized allograft  Use of intraoperative cervical traction utilizing Khan-Wells tongs and 15 pounds traction weight    Part 2  Posterior nonsegmental fixation with pedicle screws at C2, lateral mass screws at C3, C4, C5, C6, C7  Posterior lateral arthrodesis at C2, C3, C4, C5, C6, C7  Laminectomy at C3 and partial C7  Use of morselized autograft via separate incision  Use of morselized allograft  Lysis of adhesions and epidural scar  Primary microsurgical repair of durotomy  22 modifier  Use of spinal neuro navigation  Use of fluoroscopy  Use of operative microscope  Use of neuro monitoring  Use of intraoperative cervical traction utilizing Khan-Wells tongs and 15 pounds traction weight    Surgeon(s):  Reinaldo Duenas DO    Assistant:   First Assistant: Juana Ansari RN    Anesthesia: General    Estimated Blood Loss (mL): 664     Complications: Other: Small posterior durotomy primarily repaired    Specimens:   ID Type Source Tests Collected by Time Destination   1 : URINE FROM INITIAL OROURKE INSERTION Urine Urine, indwelling catheter CULTURE, URINE Linda Ludwig DO 4/12/2022 1005        Implants:  Implant Name Type Inv. Item Serial No.  Lot No. LRB No. Used Action   GRAFT BNE SUB SM 1ML CRYOPRESERVED VIABLE SHERMAN CANC BNE - [id number]  GRAFT BNE SUB SM 1ML CRYOPRESERVED VIABLE SHERMAN CANC BNE [id number] [facility]-WD [de-identified] N/A 1 Implanted   SPACER SPNL SZ B 13MM THORLUM SELF EXP CORPECTOMY SYS - [id number]  SPACER SPNL SZ B 13MM THORLUM SELF EXP CORPECTOMY SYS  [employer] SOFAMOR DANEK-WD  N/A 1 Implanted   IMPL ENDCAP SPINE STRATOSP 8DEG 13MM ST - [id number] Spine IMPL ENDCAP SPINE STRATOSP 8DEG 13MM ST  [employer] [place] [employer]-PMM  N/A 1 Implanted   PLATE SPNL C39JY LEV 2 ANT CERV TI ZEVO - [id number]  PLATE SPNL R32NU LEV 2 ANT CERV TI ZEVO  [employer] SOFAMOR DANEK-WD  N/A 1 Implanted   SCREW SPNL L17MM DIA3. 5MM ANT CERV TI SELF DRL CHIDI ANG - [id number]  SCREW SPNL L17MM DIA3. 5MM ANT CERV TI SELF DRL CHIDI ANG  [employer] SOFAMOR DANEK-WD  N/A 4 Implanted   GRAFT DURA [id number] ULTRAPURE DURAGN + EA - [id number]  GRAFT DURA [id number] ULTRAPURE DURAGN + EA [id number] [employer]WD [id number] N/A 1 Implanted   GRAFT BNE SUB [id number] SPNL DEFORMITY MAGNIFUSE SC - [id number]  GRAFT BNE SUB [id number] SPNL DEFORMITY MAGNIFUSE SC [id number] [employer] SPINALGRAFT TECH-WD N/A N/A 1 Implanted   SCREW SPNL L18MM DIA3. 5MM OCCIPITOCERVICAL UP THOR - [id number]  SCREW SPNL L18MM DIA3. 5MM OCCIPITOCERVICAL UP THOR  [employer] SOFAMOR DANEK-WD  N/A 1 Implanted   SCREW SPNL L16MM DIA3. 5MM OCCIPITOCERVICAL UP THOR - [id number]  SCREW SPNL L16MM DIA3. 5MM OCCIPITOCERVICAL UP THOR  [employer] SOFAMOR DANEK-WD  N/A 1 Implanted   SCREW SPNL L14MM DIA3. 5MM OCCIPITOCERVICAL UP THOR - [id number]  SCREW SPNL L14MM DIA3. 5MM OCCIPITOCERVICAL UP THOR  [employer] SOFAMOR DANEK-WD  N/A 10 Implanted   ROSA SPNL L90MM DIA3.5MM CO CHROM MOLYBDENUM PRECUT RECON - [id number]  ROSA SPNL L90MM DIA3.5MM CO CHROM MOLYBDENUM PRECUT RECON  MED[employer]IC SOFAMOR DANEK-WD  N/A 2 Implanted   SET SCREW SPINAL M6 - [id number]  SET SCREW SPINAL M6  MEDTR[employer]IC SOFAMOR DANEK-WD  N/A 12 Implanted         Drains:   Closed/Suction Drain Inferior;Midline Back 7 [place] (Active)       Closed/Suction Drain Right; Anterior Neck Bulb  (Active)       Closed/Suction Drain Posterior; Left Neck Bulb 7 [place] (Active)       Urethral Catheter Non-latex 16 fr (Active)       [REMOVED] External Urinary Catheter (Removed)   Catheter changed  Yes 04/12/22 0527   Urine Color Yellow 04/11/22 2030   Urine Appearance Clear 04/11/22 2030   Output (mL) 400 mL 04/12/22 0420   Suction 40 mmgHg continuous 04/11/22 2030   Placement Replaced 04/12/22 0527   Skin Assessment No Injury 04/11/22 2030       Findings: Intraoperative fluoroscopy showing good reduction of kyphotic deformity via anterior approach and good fixation with final alignment acceptable. Detailed Description of Procedure:   Patient was consented preoperatively and brought into the operating room. She was placed supine on the regular bed with a shoulder bump. Marinell Hack tongs were attached to the head by myself. Shoulders were tucked. Bruce was set up to the bed frame.   After sterile prepping draping and timeout identifying the intracervical crease in line with the mid C5 body. After sterile prepping draping timeout is performed infiltrated incision with onset lidocaine with epinephrine. 10 blade used to perform incision followed Bovie to take down the subcutaneous tissue followed by self-retaining retractor. Metzenbaum scissors and pickups were used to undermine the platysma which was transected using Bovie cautery. Blunt and sharp dissection using Metzenbaum scissors and finger dissection was used to clear the plane medial to the SCM. Preservation of the external jugular vein was performed. Omohyoid was swept inferiorly. Prevertebral fascia was cleared using a combination of Metzenbaum scissors and Kitner. Cloward retractor was used to retract the esophagus by the assistant. Bovie and suction were used to clear off the ALL from mid C4 to mid C6 undermining both longus coli. Pipelle retractor was then placed cephalad caudad as well as medial lateral.  Operative microscope was then brought into the field. 15 blade was used to perform annulotomy at C4-5 and C5-6 followed by 2 and 4 through S2 remove the bulk of the disc. Drill was then used to remove the superior and inferior endplates of C5 to allow for complete decompression posteriorly of the posterior longitudinal ligament. 2 curette was used to penetrate the PLL followed by 2 punch to circumferentially resect the posterior longitudinal ligament posterior osteophytes and undermine the endplates of C4 and C6. Drill was then used to drill gutters bilaterally medial to the longus coli for en bloc removal of the C5 vertebral body which was handed off for graft. Upgoing curette was then used to circumferentially dissect out the plane posterior to the PLL and resected. FloSeal Gelfoam used for hemostasis. At this point 10 to 15 pounds of weight was placed on the pulley system to allow for cervical traction.   The [place] pin was also placed in the C4 mid body and angled upward to allow for opening of the kyphotic deformity. Expandable Medtronic cage was filled with morselized autograft via the corpectomy and some cellular graft. An 8 degree endplate was then attached. This was slightly countersunk under direct visualization. Expansion was then performed. It was locked in place after appropriate fluoroscopy. Traction was then slowly released. Anterior fixation with a plate and 90LB screws was placed. Wound was thoroughly irrigated and hemostasis was obtained with FloSeal Gelfoam.  7 flat TRAMAINE drain was tunneled subplatysmal and secured with a drain stitch. Wound was closed in 2 layers utilizing 3-0 Vicryl interrupteds for the platysma and a running 4-0 Monocryl for skin and Dermabond. Drain stitch was also placed. The patient was then placed in a cervical collar and flipped prone onto the regular table gel rolls and head placed in the foam headrest.  Khan-KlickEx tongs were kept in place. Traction was again applied with minimal 5 pounds. Midline prior incision was marked out and extended cephalad and caudad after the arms were tucked and shoulders taped caudad. Neural monitoring was used for this portion of the case. Baseline sensory's were obtained but motors were unobtainable. After sterile prepping draping a timeout was performed infiltrated incision with onset lidocaine with epinephrine. 10 blade used to perform incision followed by [place] and [place] to take it all the way down to the spinous processes of C2 and C7. Careful dissection at this depth was performed directly on top of bone in a subperiosteal fashion at the C3 lamina and C7. Noting the depth of the lamina I gently dissected in between the 2 as there was no posterior elements. Bovie was directed laterally so as to expose the medial portion of the facet. Bilateral dissection was then performed to the lateral facet line from C2 all the way to C7 and advancement of retractors was performed.   A very small area of durotomy approximately 4 to 5 mm was noted at the inferior right side margin of the dural exposure at approximately C6 where there was prior lamina missing. Laminectomy of C3 and partial C7 was performed to complete decompression. Drill was then used to drill starting points of bilateral C2 pedicle screws at approximately midpoint cephalad caudad. 45 degrees and equal medialization. Handrolled was then used to drill down the depth of approximate 16 mm followed by ball-tipped palm and placement of screws bilaterally. Lateral mask was replaced from 3-7 bilaterally starting at the midpoint of the lateral mass and ending 45 degrees cephalad and 20 degrees lateralized with a handrolled with up to 14 mm. 40 mm screws were placed bilaterally from C3-C7. Rods were bent anatomically after fluoroscopy showed there was good realignment and reduction. Rods were affixed with caps and final tightened. I then turned my attention to the lamina. Gentle dissection of the inferior lamina of C3 was performed and an upgoing curette and pickups were used to clear the epidural plane. This encountered a significant lot of difficulty due to large amount of scar and adhesions. Gentle dissection of the adhesions of the scar was performed using operative microscopic guidance and a small curette. I circumferentially cleared off the area around the durotomy as well. The significant epidural scarring increased at least 1 to 2 hours of operative time due to the exposure in combination with direct decompression of the thecal sac. After complete decompression of the thecal sac and direct visualization of dura from C3 to the top of C7 I then performed a direct microsurgical closure of the small durotomy using a running 5-0 Prolene. Small amount of Vistaseal was directly placed over the leak site after closure. O-arm spin was completed showing all hardware to be in good position.   Bilateral arthrodesis along the facet lines as well as lateral to the hardware was performed. Back of bones as well as the morselized autograft from the corpectomy that was remaining was placed lateral to the hardware. Hemostasis was obtained with bipolar cautery FloSeal and Gelfoam.  Motor and sensory potentials were stable throughout the case. 7 5 TRAMAINE drain was tunneled subfascial and secured with a drain stitch. Wound was closed with multiple 0 Vicryl's for the muscle as well as fascia inverted 2-0 Vicryl's for subcutaneous tissue and a running 3-0 nylon for skin followed by bacitracin island after a drain stitch was placed. Patient was placed in a collar and the traction weight was slowly removed. Patient was then flipped back supine and the Khan-Wells tongs were removed by myself.   She was extubated in stable condition    Electronically signed by Niurka Cueva DO on 4/12/2022 at 6:53 PM

## 2022-04-12 NOTE — ANESTHESIA PRE PROCEDURE
Department of Anesthesiology  Preprocedure Note       Name:  Jonathan Cortes   Age:  62 y.o.  :  1960                                          MRN:  [id number]         Date:  2022      Surgeon: Ale Escobar):  Renetta Mahoney DO    Procedure: Procedure(s):  C5 CORPECTOMY, POSTERIOR FIXATION FROM C2-T1 WITH ARTHRODESIS, OSTEOTOMY AND CORRECTION OF DEFORMITY  ([employer], GARNER  HEADHOLDER, PRONE,  O-ARM, [facility]OKES CONF# [id number] - LIAN)    Medications prior to admission:   Prior to Admission medications    Medication Sig Start Date End Date Taking? Authorizing Provider   dilTIAZem (DILACOR XR) 180 MG extended release capsule TAKE ONE CAPSULE BY MOUTH DAILY 22   Kisha Read MD   ferrous sulfate (IRON 325) 325 (65 Fe) MG tablet Take 1 tablet by mouth 2 times daily (with meals) 22   Claudette Bump, MD   docusate sodium (COLACE) 100 MG capsule Take 2 capsules by mouth 2 times daily 22   Claudette Bump, MD   pantoprazole (PROTONIX) 40 MG tablet TAKE ONE TABLET BY MOUTH DAILY 21   Mauri Strickland MD   busPIRone (BUSPAR) 30 MG tablet Take 30 mg by mouth 2 times daily 21   Mauri Strickland MD   levothyroxine (SYNTHROID) 88 MCG tablet Take 1 tablet by mouth Daily 12/17/21 3/18/22  Mauri Strickland MD   tiZANidine (ZANAFLEX) 4 MG tablet Take 4 mg by mouth every 12 hours    Historical Provider, MD   Vitamin D, Ergocalciferol, 50 MCG ( UT) CAPS TAKE ONE CAPSULE BY MOUTH DAILY 3/29/21   Madisyn Bolaños PA-C   HYDROcodone-acetaminophen (NORCO) 7.5-325 MG per tablet Up to three tablets a day. 10/21/15   Historical Provider, MD   methadone (DOLOPHINE) 10 MG tablet Take 10 mg by mouth 2 times daily. Historical Provider, MD       Current medications:    No current facility-administered medications for this visit. No current outpatient medications on file.      Facility-Administered Medications Ordered in Other Visits   Medication Dose Route Frequency Provider Last Rate Last Admin    [facility] Hold] remifentanil (ULTIVA) 2,000 mcg in sodium chloride (PF) 40 mL infusion  0.025 mcg/kg/min IntraVENous Continuous Linda Ludwig DO        [[facility] Hold] busPIRone (BUSPAR) tablet 30 mg  30 mg Oral BID Isi Reddy MD   30 mg at 04/11/22 2156    [MAR Hold] dilTIAZem (CARDIZEM CD) extended release capsule 180 mg  180 mg Oral Daily Isi Reddy MD        [facility]] docusate sodium (COLACE) capsule 200 mg  200 mg Oral BID Isi Reddy MD        [facility]] ferrous sulfate (FE TABS 325) EC tablet 325 mg  325 mg Oral BID WC Isi Reddy MD        [MAR Hold] levothyroxine (SYNTHROID) tablet 88 mcg  88 mcg Oral Daily Isi Reddy MD        [facility]] pantoprazole (PROTONIX) tablet 40 mg  40 mg Oral Daily Isi Reddy MD        [facility]] tiZANidine (ZANAFLEX) tablet 4 mg  4 mg Oral Q12H PRN Isi Reddy MD   4 mg at 04/11/22 2157    [MAR Hold] Vitamin D (CHOLECALCIFEROL) tablet 2,000 Units  50 mcg Oral Daily Isi Reddy MD        [facility]] sodium chloride flush 0.9 % injection 5-40 mL  5-40 mL IntraVENous 2 times per day Isi Reddy MD   5 mL at 04/11/22 2157    [MAR Hold] sodium chloride flush 0.9 % injection 5-40 mL  5-40 mL IntraVENous PRN Isi Reddy MD        [facility]] 0.9 % sodium chloride infusion  25 mL IntraVENous PRN Isi Reddy MD        [facility]] acetaminophen (TYLENOL) tablet 650 mg  650 mg Oral Q4H PRN Isi Reddy MD        [facility] Hold] ondansetron (ZOFRAN-ODT) disintegrating tablet 4 mg  4 mg Oral Q8H PRN Isi Reddy MD        Or    [facility] Hold] ondansetron (ZOFRAN) injection 4 mg  4 mg IntraVENous Q6H PRN Isi Reddy MD        [facility] Hold] oxyCODONE (ROXICODONE) immediate release tablet 5 mg  5 mg Oral Q4H PRN Isi Reddy MD   5 mg at 04/11/22 5786    Or    [[facility] Hold] oxyCODONE (ROXICODONE) immediate release tablet 10 mg  10 mg Oral Q4H PRN Isi Reddy MD   10 mg at 04/12/22 3028    [MAR Hold] fentaNYL (SUBLIMAZE) injection 25 mcg  25 mcg IntraVENous Q1H PRN Andie Lr MD        Or    [facility] Hold] fentaNYL (SUBLIMAZE) injection 50 mcg  50 mcg IntraVENous Q1H PRN Andie Lr MD        [facility] Hold] 0.9 % sodium chloride infusion   IntraVENous Continuous Andie Lr MD 75 mL/hr at 04/12/22 0451 Rate Verify at 04/12/22 0451    [MAR Hold] 0.9 % sodium chloride infusion   IntraVENous PRN Andie Lr MD           Allergies:     Allergies   Allergen Reactions    Latex Hives, Itching, Dermatitis and Rash    Kiwi Extract      Face swelling, some difficulty breathing       Problem List:    Patient Active Problem List   Diagnosis Code    Cervical stenosis of spine M48.02    Essential hypertension I10    Hypothyroidism E03.9    Lumbar radiculopathy, chronic M54.16    Lumbar neuritis M54.16    Post laminectomy syndrome M96.1    Hypokalemia E87.6    Pain of right hip joint M25.551    Post-menopausal osteoporosis M81.0    Chronic gastritis without bleeding K29.50    Elevated CEA R97.0    Esophageal dysphagia R13.19    Hypotension due to drugs I95.2    Ulcerative esophagitis K22.10    Weight loss, abnormal R63.4    Cervical myelopathy (HCC) G95.9    Lumbar stenosis with neurogenic claudication M48.062    Spinal deformity Q76.49    Anxiety about health F41.8    Cauda equina compression (HCC) G83.4    Anemia, normocytic normochromic D64.9    Iron deficiency E61.1    Pseudomonas urinary tract infection N39.0, B96.5    Hypocalcemia E83.51    Cauda equina syndrome (HCC) G83.4    Hemiplegia (HCC) G81.90    Back pain M54.9       Past Medical History:        Diagnosis Date    Anxiety     Arthritis     At maximum risk for fall 12/29/2021    caudal equina syndrome and cervical stenosis    Cancer (HCC)     right breast    Cauda equina syndrome ([address] 12/29/2021    neuropathy, mobility difficulty, incontinance    Cervical stenosis of spine 12/29/2021    GERD (gastroesophageal reflux disease)     History of stomach ulcers     Hypertension     Dr. Сергей George    Hypothyroidism     Lumbar neuritis     Post laminectomy syndrome     Spinal deformity 2021    cervical and lumbar    Thyroid disease     Uses wheelchair     Wears dentures     Wellness examination     Dr. Сергей George       Past Surgical History:        Procedure Laterality Date    BACK SURGERY      lower back x 3, cervical- x 1: C4- C6, 2014     BREAST SURGERY Right     mastectomy with reconstruction    COLONOSCOPY  about 2018    HERNIA REPAIR Bilateral     inguinal    HYSTERECTOMY, TOTAL ABDOMINAL      LUMBAR SPINE SURGERY N/A 2021    LUMBAR LAMINECTOMY L2-S1 performed by Ganesh De La Torre DO at [facility]         Social History:    Social History     Tobacco Use    Smoking status: Former Smoker     Packs/day: 0.50     Years: 30.00     Pack years: 15.00     Quit date:      Years since quittin.2    Smokeless tobacco: Never Used   Substance Use Topics    Alcohol use: Yes     Alcohol/week: 0.0 standard drinks     Comment: weekend at times                                Counseling given: Not Answered      Vital Signs (Current): There were no vitals filed for this visit.                                            BP Readings from Last 3 Encounters:   22 (!) 151/89   22 (!) 154/79   22 (!) 154/78       NPO Status:                                                                                 BMI:   Wt Readings from Last 3 Encounters:   22 142 lb (64.4 kg)   22 142 lb (64.4 kg)   22 142 lb (64.4 kg)     There is no height or weight on file to calculate BMI.    CBC:   Lab Results   Component Value Date    WBC 4.8 2022    RBC 3.48 2022    HGB 9.8 2022    HCT 30.8 2022    MCV 88.5 2022    RDW 16.3 2022     2022       CMP:   Lab Results   Component Value Date     2022    K 3.6 04/12/2022     04/12/2022    CO2 21 04/12/2022    BUN 6 04/12/2022    CREATININE 0.57 04/12/2022    GFRAA >60 04/12/2022    LABGLOM >60 04/12/2022    GLUCOSE 78 04/12/2022    PROT 7.6 04/11/2022    CALCIUM 7.6 04/12/2022    BILITOT 0.33 04/11/2022    ALKPHOS 94 04/11/2022    AST 25 04/11/2022    ALT 13 04/11/2022       POC Tests: No results for input(s): POCGLU, POCNA, POCK, POCCL, POCBUN, POCHEMO, POCHCT in the last 72 hours. Coags:   Lab Results   Component Value Date    PROTIME 11.6 04/12/2022    INR 1.1 04/12/2022    APTT 23.6 04/12/2022       HCG (If Applicable): No results found for: PREGTESTUR, PREGSERUM, HCG, HCGQUANT     ABGs: No results found for: PHART, PO2ART, BUJ7DDQ, PCP3NNF, BEART, Z9USNJKU     Type & Screen (If Applicable):  No results found for: LABABO, LABRH    Drug/Infectious Status (If Applicable):  No results found for: HIV, HEPCAB    COVID-19 Screening (If Applicable):   Lab Results   Component Value Date    COVID19 Not Detected 04/11/2022           Anesthesia Evaluation   no history of anesthetic complications:   Airway: Mallampati: II        Dental:          Pulmonary:Negative Pulmonary ROS and normal exam  breath sounds clear to auscultation  (+) current smoker    (-) COPD and asthma                           Cardiovascular:    (+) hypertension:,         Rhythm: regular  Rate: normal                    Neuro/Psych:   (+) neuromuscular disease:, depression/anxiety             GI/Hepatic/Renal:   (+) GERD:, PUD,           Endo/Other:    (+) hypothyroidism::., malignancy/cancer. ROS comment: Cancer ([address] right breast  Abdominal:             Vascular: negative vascular ROS. Other Findings:               Anesthesia Plan      general     ASA 3       Induction: intravenous. MIPS: Postoperative opioids intended, Prophylactic antiemetics administered and Postoperative trial extubation. Plan discussed with CRNA.                   London Jaramillo MD 4/12/2022

## 2022-04-13 ENCOUNTER — APPOINTMENT (OUTPATIENT)
Dept: GENERAL RADIOLOGY | Age: 62
DRG: 471 | End: 2022-04-13
Attending: NEUROLOGICAL SURGERY
Payer: MEDICARE

## 2022-04-13 PROBLEM — N30.00 ACUTE CYSTITIS WITHOUT HEMATURIA: Status: ACTIVE | Noted: 2022-04-13

## 2022-04-13 LAB
ABO/RH: NORMAL
ABSOLUTE EOS #: <0.03 K/UL (ref 0–0.44)
ABSOLUTE IMMATURE GRANULOCYTE: 0.13 K/UL (ref 0–0.3)
ABSOLUTE LYMPH #: 0.82 K/UL (ref 1.1–3.7)
ABSOLUTE MONO #: 1.15 K/UL (ref 0.1–1.2)
ALBUMIN SERPL-MCNC: 3.9 G/DL (ref 3.5–5.2)
ALBUMIN/GLOBULIN RATIO: 1.2 (ref 1–2.5)
ALP BLD-CCNC: 78 U/L (ref 35–104)
ALT SERPL-CCNC: 13 U/L (ref 5–33)
ANION GAP SERPL CALCULATED.3IONS-SCNC: 13 MMOL/L (ref 9–17)
ANTIBODY SCREEN: NEGATIVE
ARM BAND NUMBER: NORMAL
AST SERPL-CCNC: 31 U/L
BASOPHILS # BLD: 0 % (ref 0–2)
BASOPHILS ABSOLUTE: 0.03 K/UL (ref 0–0.2)
BILIRUB SERPL-MCNC: 0.37 MG/DL (ref 0.3–1.2)
BLD PROD TYP BPU: NORMAL
BPU ID: NORMAL
BUN BLDV-MCNC: 7 MG/DL (ref 8–23)
CALCIUM SERPL-MCNC: 8.6 MG/DL (ref 8.6–10.4)
CHLORIDE BLD-SCNC: 89 MMOL/L (ref 98–107)
CO2: 24 MMOL/L (ref 20–31)
CREAT SERPL-MCNC: 0.71 MG/DL (ref 0.5–0.9)
CROSSMATCH RESULT: NORMAL
DISPENSE STATUS BLOOD BANK: NORMAL
EOSINOPHILS RELATIVE PERCENT: 0 % (ref 1–4)
EXPIRATION DATE: NORMAL
GFR AFRICAN AMERICAN: >60 ML/MIN
GFR NON-AFRICAN AMERICAN: >60 ML/MIN
GFR SERPL CREATININE-BSD FRML MDRD: ABNORMAL ML/MIN/{1.73_M2}
GLUCOSE BLD-MCNC: 142 MG/DL (ref 70–99)
HCT VFR BLD CALC: 32.5 % (ref 36.3–47.1)
HEMOGLOBIN: 10.2 G/DL (ref 11.9–15.1)
IMMATURE GRANULOCYTES: 1 %
LYMPHOCYTES # BLD: 5 % (ref 24–43)
MCH RBC QN AUTO: 27.8 PG (ref 25.2–33.5)
MCHC RBC AUTO-ENTMCNC: 31.4 G/DL (ref 28.4–34.8)
MCV RBC AUTO: 88.6 FL (ref 82.6–102.9)
MONOCYTES # BLD: 7 % (ref 3–12)
NRBC AUTOMATED: 0 PER 100 WBC
PDW BLD-RTO: 17 % (ref 11.8–14.4)
PLATELET # BLD: 392 K/UL (ref 138–453)
PMV BLD AUTO: 9.2 FL (ref 8.1–13.5)
POTASSIUM SERPL-SCNC: 4 MMOL/L (ref 3.7–5.3)
RBC # BLD: 3.67 M/UL (ref 3.95–5.11)
RBC # BLD: ABNORMAL 10*6/UL
SEG NEUTROPHILS: 87 % (ref 36–65)
SEGMENTED NEUTROPHILS ABSOLUTE COUNT: 13.69 K/UL (ref 1.5–8.1)
SODIUM BLD-SCNC: 126 MMOL/L (ref 135–144)
TOTAL PROTEIN: 7.1 G/DL (ref 6.4–8.3)
TRANSFUSION STATUS: NORMAL
UNIT DIVISION: 0
WBC # BLD: 15.8 K/UL (ref 3.5–11.3)

## 2022-04-13 PROCEDURE — 2580000003 HC RX 258: Performed by: NURSE PRACTITIONER

## 2022-04-13 PROCEDURE — 97162 PT EVAL MOD COMPLEX 30 MIN: CPT

## 2022-04-13 PROCEDURE — 97166 OT EVAL MOD COMPLEX 45 MIN: CPT

## 2022-04-13 PROCEDURE — 6360000002 HC RX W HCPCS: Performed by: STUDENT IN AN ORGANIZED HEALTH CARE EDUCATION/TRAINING PROGRAM

## 2022-04-13 PROCEDURE — 2060000000 HC ICU INTERMEDIATE R&B

## 2022-04-13 PROCEDURE — 36415 COLL VENOUS BLD VENIPUNCTURE: CPT

## 2022-04-13 PROCEDURE — 2500000003 HC RX 250 WO HCPCS: Performed by: NURSE PRACTITIONER

## 2022-04-13 PROCEDURE — 99222 1ST HOSP IP/OBS MODERATE 55: CPT | Performed by: STUDENT IN AN ORGANIZED HEALTH CARE EDUCATION/TRAINING PROGRAM

## 2022-04-13 PROCEDURE — 97535 SELF CARE MNGMENT TRAINING: CPT

## 2022-04-13 PROCEDURE — 6360000002 HC RX W HCPCS: Performed by: NURSE PRACTITIONER

## 2022-04-13 PROCEDURE — 6370000000 HC RX 637 (ALT 250 FOR IP): Performed by: NURSE PRACTITIONER

## 2022-04-13 PROCEDURE — 99222 1ST HOSP IP/OBS MODERATE 55: CPT | Performed by: INTERNAL MEDICINE

## 2022-04-13 PROCEDURE — 97530 THERAPEUTIC ACTIVITIES: CPT

## 2022-04-13 PROCEDURE — APPSS15 APP SPLIT SHARED TIME 0-15 MINUTES: Performed by: NURSE PRACTITIONER

## 2022-04-13 PROCEDURE — 72040 X-RAY EXAM NECK SPINE 2-3 VW: CPT

## 2022-04-13 PROCEDURE — 85025 COMPLETE CBC W/AUTO DIFF WBC: CPT

## 2022-04-13 PROCEDURE — 80053 COMPREHEN METABOLIC PANEL: CPT

## 2022-04-13 PROCEDURE — 2500000003 HC RX 250 WO HCPCS: Performed by: STUDENT IN AN ORGANIZED HEALTH CARE EDUCATION/TRAINING PROGRAM

## 2022-04-13 RX ORDER — METHADONE HYDROCHLORIDE 10 MG/1
10 TABLET ORAL 2 TIMES DAILY
Status: DISCONTINUED | OUTPATIENT
Start: 2022-04-13 | End: 2022-04-15 | Stop reason: HOSPADM

## 2022-04-13 RX ORDER — VITAMIN B COMPLEX
5000 TABLET ORAL WEEKLY
Status: DISCONTINUED | OUTPATIENT
Start: 2022-04-13 | End: 2022-04-15 | Stop reason: HOSPADM

## 2022-04-13 RX ORDER — VITAMIN B COMPLEX
5000 TABLET ORAL WEEKLY
Status: DISCONTINUED | OUTPATIENT
Start: 2022-04-13 | End: 2022-04-13

## 2022-04-13 RX ORDER — FENTANYL CITRATE 50 UG/ML
50 INJECTION, SOLUTION INTRAMUSCULAR; INTRAVENOUS ONCE
Status: COMPLETED | OUTPATIENT
Start: 2022-04-13 | End: 2022-04-13

## 2022-04-13 RX ORDER — LABETALOL HYDROCHLORIDE 5 MG/ML
10 INJECTION, SOLUTION INTRAVENOUS EVERY 4 HOURS PRN
Status: DISCONTINUED | OUTPATIENT
Start: 2022-04-13 | End: 2022-04-15 | Stop reason: HOSPADM

## 2022-04-13 RX ORDER — VITAMIN B COMPLEX
5000 TABLET ORAL DAILY
Status: DISCONTINUED | OUTPATIENT
Start: 2022-04-13 | End: 2022-04-13

## 2022-04-13 RX ADMIN — CYCLOBENZAPRINE 10 MG: 10 TABLET, FILM COATED ORAL at 08:23

## 2022-04-13 RX ADMIN — ACETAMINOPHEN 650 MG: 325 TABLET ORAL at 08:20

## 2022-04-13 RX ADMIN — ACETAMINOPHEN 650 MG: 325 TABLET ORAL at 15:18

## 2022-04-13 RX ADMIN — FERROUS SULFATE TAB EC 325 MG (65 MG FE EQUIVALENT) 325 MG: 325 (65 FE) TABLET DELAYED RESPONSE at 16:20

## 2022-04-13 RX ADMIN — OXYCODONE 5 MG: 5 TABLET ORAL at 20:04

## 2022-04-13 RX ADMIN — SODIUM CHLORIDE: 9 INJECTION, SOLUTION INTRAVENOUS at 09:14

## 2022-04-13 RX ADMIN — WATER 2000 MG: 100 INJECTION, SOLUTION INTRAVENOUS at 08:47

## 2022-04-13 RX ADMIN — MORPHINE SULFATE 4 MG: 4 INJECTION INTRAVENOUS at 04:59

## 2022-04-13 RX ADMIN — OXYCODONE 5 MG: 5 TABLET ORAL at 21:54

## 2022-04-13 RX ADMIN — DOCUSATE SODIUM 200 MG: 100 CAPSULE ORAL at 20:03

## 2022-04-13 RX ADMIN — FERROUS SULFATE TAB EC 325 MG (65 MG FE EQUIVALENT) 325 MG: 325 (65 FE) TABLET DELAYED RESPONSE at 08:24

## 2022-04-13 RX ADMIN — MORPHINE SULFATE 4 MG: 4 INJECTION INTRAVENOUS at 23:14

## 2022-04-13 RX ADMIN — BUSPIRONE HYDROCHLORIDE 30 MG: 15 TABLET ORAL at 20:04

## 2022-04-13 RX ADMIN — FENTANYL CITRATE 50 MCG: 50 INJECTION, SOLUTION INTRAMUSCULAR; INTRAVENOUS at 02:07

## 2022-04-13 RX ADMIN — DOCUSATE SODIUM 200 MG: 100 CAPSULE ORAL at 08:23

## 2022-04-13 RX ADMIN — METHADONE HYDROCHLORIDE 10 MG: 10 TABLET ORAL at 20:04

## 2022-04-13 RX ADMIN — DILTIAZEM HYDROCHLORIDE 180 MG: 180 CAPSULE, COATED, EXTENDED RELEASE ORAL at 08:22

## 2022-04-13 RX ADMIN — TIZANIDINE 4 MG: 4 TABLET ORAL at 08:26

## 2022-04-13 RX ADMIN — ACETAMINOPHEN 650 MG: 325 TABLET ORAL at 03:16

## 2022-04-13 RX ADMIN — WATER 2000 MG: 100 INJECTION, SOLUTION INTRAVENOUS at 18:04

## 2022-04-13 RX ADMIN — BUSPIRONE HYDROCHLORIDE 30 MG: 15 TABLET ORAL at 08:25

## 2022-04-13 RX ADMIN — WATER 2000 MG: 100 INJECTION, SOLUTION INTRAVENOUS at 01:57

## 2022-04-13 RX ADMIN — MORPHINE SULFATE 4 MG: 4 INJECTION INTRAVENOUS at 11:41

## 2022-04-13 RX ADMIN — OXYCODONE 10 MG: 5 TABLET ORAL at 15:08

## 2022-04-13 RX ADMIN — CYCLOBENZAPRINE 10 MG: 10 TABLET, FILM COATED ORAL at 15:25

## 2022-04-13 RX ADMIN — LEVOTHYROXINE SODIUM 88 MCG: 88 TABLET ORAL at 08:24

## 2022-04-13 RX ADMIN — PANTOPRAZOLE SODIUM 40 MG: 40 TABLET, DELAYED RELEASE ORAL at 08:24

## 2022-04-13 RX ADMIN — METHADONE HYDROCHLORIDE 10 MG: 10 TABLET ORAL at 16:19

## 2022-04-13 RX ADMIN — OXYCODONE 10 MG: 5 TABLET ORAL at 08:21

## 2022-04-13 RX ADMIN — Medication 10 MG: at 11:40

## 2022-04-13 RX ADMIN — CYCLOBENZAPRINE 10 MG: 10 TABLET, FILM COATED ORAL at 20:04

## 2022-04-13 RX ADMIN — OXYCODONE 10 MG: 5 TABLET ORAL at 03:16

## 2022-04-13 RX ADMIN — POLYETHYLENE GLYCOL 3350 17 G: 17 POWDER, FOR SOLUTION ORAL at 08:22

## 2022-04-13 RX ADMIN — ACETAMINOPHEN 650 MG: 325 TABLET ORAL at 20:04

## 2022-04-13 ASSESSMENT — PAIN DESCRIPTION - ORIENTATION
ORIENTATION: ANTERIOR;POSTERIOR
ORIENTATION: MID
ORIENTATION: MID;POSTERIOR

## 2022-04-13 ASSESSMENT — PAIN DESCRIPTION - PAIN TYPE
TYPE: ACUTE PAIN;SURGICAL PAIN

## 2022-04-13 ASSESSMENT — PAIN SCALES - GENERAL
PAINLEVEL_OUTOF10: 9
PAINLEVEL_OUTOF10: 9
PAINLEVEL_OUTOF10: 10
PAINLEVEL_OUTOF10: 9
PAINLEVEL_OUTOF10: 10
PAINLEVEL_OUTOF10: 7
PAINLEVEL_OUTOF10: 10
PAINLEVEL_OUTOF10: 9
PAINLEVEL_OUTOF10: 7
PAINLEVEL_OUTOF10: 10
PAINLEVEL_OUTOF10: 9
PAINLEVEL_OUTOF10: 10
PAINLEVEL_OUTOF10: 8
PAINLEVEL_OUTOF10: 9

## 2022-04-13 ASSESSMENT — PAIN DESCRIPTION - LOCATION
LOCATION: NECK

## 2022-04-13 ASSESSMENT — PAIN DESCRIPTION - FREQUENCY
FREQUENCY: CONTINUOUS

## 2022-04-13 ASSESSMENT — PAIN DESCRIPTION - DESCRIPTORS
DESCRIPTORS: ACHING;DISCOMFORT
DESCRIPTORS: ACHING;DISCOMFORT;HEADACHE
DESCRIPTORS: ACHING

## 2022-04-13 ASSESSMENT — PAIN - FUNCTIONAL ASSESSMENT: PAIN_FUNCTIONAL_ASSESSMENT: PREVENTS OR INTERFERES SOME ACTIVE ACTIVITIES AND ADLS

## 2022-04-13 ASSESSMENT — PAIN DESCRIPTION - ONSET
ONSET: ON-GOING
ONSET: ON-GOING

## 2022-04-13 NOTE — CARE COORDINATION
Case Management Initial Discharge Plan  Rochelle Bang,             Met with:patient to discuss discharge plans. Information verified: address, contacts, phone number, , insurance Yes  Insurance Provider: Medicare    Emergency Contact/Next of Kin name & number: Kevin Burr - sig other - [phone]  Raven Latham - dtr - [phone]  Who are involved in patient's support system? Partner, dtr    PCP: Bhumika Verdugo MD  Date of last visit: few weeks ago      Discharge Planning    Living Arrangements: Pt lives with sig other, [facility] has 2 stories  1 stairs to climb to get into front door, 8-10stairs to climb to reach second floor   Location of bedroom/bathroom in home  - main level    Patient able to perform ADL's:Assisted by López Torres (outpatient & in home)   DME equipment: w/c, walker  DME provider:     Is patient receiving oral anticoagulation therapy? No    If indicated:   Physician managing anticoagulation treatment:   Where does patient obtain lab work for ATC treatment? Does patient have any issues/concerns obtaining medications? No  If yes, what are patient's concerns? Is there a preferred Pharmacy after hours or on weekends? Yes - [facility] on [address]    If yes, which pharmacy? Potential Assistance Needed: ARU vs SNF      Patient agreeable to home care: Yes  Freedom of choice provided:  yes    Prior SNF/Rehab Placement and Facility: [address] [place] [address] - 2022  Agreeable to SNF/Rehab: Yes  [place] of choice provided: yes, would like [address] [place] [address]     Evaluation: no    Expected Discharge date:       Patient expects to be discharged to:ARU vs SNF       If home: is the family and/or caregiver wiling & able to provide support at home? SNF/ARU  Who will be providing this support?     Follow Up Appointment: Best Day/ Time:      Transportation provider: sig other  Transportation arrangements needed for discharge: Yes -

## 2022-04-13 NOTE — ANESTHESIA POSTPROCEDURE EVALUATION
Department of Anesthesiology  Postprocedure Note    Patient: Dominique Bartlett  MRN: [id number]  YOB: 1960  Date of evaluation: 4/12/2022  Time:  9:37 PM     Procedure Summary     Date: 04/12/22 Room / Location: [address]    Anesthesia Start: 0874 Anesthesia Stop: 0162    Procedures:       C5 CORPECTOMY, USE OF INTRAOPERATIVE CERVICAL TRACTION (N/A Spine Cervical)      POSTERIOR FIXATION C2-C7 (N/A Spine Cervical) Diagnosis: (CERVICAL MYELOPATHY, LUMBAR STENOSIS WITH NEUROGENIC CLAUDICATION, LEFT SPASTIC HEMIPLEGIA, SPINAL DEFORMITY)    Surgeons: Delbert Mckeon DO Responsible Provider: Mayuri Sellers MD    Anesthesia Type: general ASA Status: 3          Anesthesia Type: general    Nadeem Phase I: Nadeem Score: 9    Nadeem Phase II:      Last vitals: Reviewed and per EMR flowsheets.        Anesthesia Post Evaluation    Patient location during evaluation: PACU  Patient participation: complete - patient participated  Level of consciousness: awake  Pain score: 1  Airway patency: patent  Nausea & Vomiting: no nausea and no vomiting  Complications: no  Cardiovascular status: blood pressure returned to baseline and hemodynamically stable  Respiratory status: acceptable  Hydration status: euvolemic

## 2022-04-13 NOTE — CONSULTS
Physical Medicine & Rehabilitation  Consult Note      Admitting Physician:  Amanda Chávez DO    Primary Care Provider:  Alexsander Mederos MD     Reason for Consult:  Acute Inpatient Rehabilitation    Chief Complaint:  Cervical stenosis    History of Present Illness:  Referring Provider is requesting an evaluation for appropriate placement upon discharge from acute care. History from chart review and patient. Melvina Clark is a 62 y.o. female with history of cauda equina syndrome s/p L2-S1 laminectomy (12/2021), HTN, hypothyroidism, GERD, breast cancer, and anxiety admitted to [facility] on 4/11/2022. She initially presented for planned surgical intervention for cervical stenosis. She underwent C5 corpectomy with C4-C6 arthrodesis, open reduction of cervical kyphotic deformity, and C2-C7 posterior fusion on 4/12/22 (Dr. Mary Alice Ceballos). She has a cervical collar when out of bed. Upon evaluation, she is fatigued and drowsy but arousable. She reports neck pain as well as ongoing numbness/tingling and weakness of the limbs. She denies any changes in bowel or bladder control. She states that she was doing pretty well after her lumbosacral surgery in 12/2021 but then she had a functional decline. She is unable to provide any additional history due to drowsiness. Review of Systems:  Review of Systems   Constitutional: Positive for malaise/fatigue. Negative for fever. Gastrointestinal:        No change in bowel control   Genitourinary:        No change in bladder control   Musculoskeletal: Positive for neck pain.    Neurological: Positive for sensory change and weakness.     - She is unable to participate in a full ROS due to drowsiness (multiple attempts were made)     Premorbid function:  Needing assistance with ADLs    Current function:    PT:  Restrictions/Precautions: Fall Risk,General Precautions  Other position/activity restrictions: ambulate pt; s/p C2-C7 fixation  Required Braces or Orthoses  Cervical: c-collar (when OOB; ok to remove for eating, drinking, resting in bed)   Transfers  Comment: further transfers not performed this date secondary to pt c/o pain and vomiting after taking pills with RN present. Transfers  Comment: further transfers not performed this date secondary to pt c/o pain and vomiting after taking pills with RN present. Ambulation  Ambulation?: No                 OT:   ADL  Feeding: Setup,Verbal cueing,Increased time to complete,Contact guard assistance  Grooming: Minimal assistance,Setup,Verbal cueing,Increased time to complete  UE Bathing: Moderate assistance,Verbal cueing,Setup,Increased time to complete  LE Bathing: Maximum assistance,Verbal cueing,Setup,Increased time to complete  UE Dressing: Moderate assistance  LE Dressing: Maximum assistance,Verbal cueing,Setup,Increased time to complete  Toileting: Maximum assistance,Increased time to complete  Additional Comments: Pt donned hospital gown seated EOB with mod A for threading arms through sleeves and dynamic sitting balance; pt reported increase in surgical pain in neck with UB dressing. Pt took pills with setup, CGA for sitting balance, and VC for swallowing with c-collar. Balance  Sitting Balance: Minimal assistance (Progressing to CGA; assist for trunk support, sitting EOB with feet on floor for UB dressing and taking pills ~8 min)  Standing Balance: Unable to assess(comment) (d/t increased surgical pain, nausea with vomiting, and BLE weakness and deficits)            Bed mobility  Supine to Sit: Moderate assistance,2 Person assistance (HOB elevated; pt sleeps in recliner at baseline; requires assist for trunk)  Sit to Supine: Maximum assistance,2 Person assistance (for trunk and LE progression)  Scooting: Minimal assistance  Comment: pt sat EOB ~15 mins. C-collar donned with max assist prior to mobility.   Transfers  Transfer Comments: Unable to assess this date d/t increased surgical pain, nausea with vomiting, and BLE weakness and deficits                 SLP:      Past Medical History:        Diagnosis Date    Anxiety     Arthritis     At maximum risk for fall 12/29/2021    caudal equina syndrome and cervical stenosis    Cancer (HCC)     right breast    Cauda equina syndrome ([address] 12/29/2021    neuropathy, mobility difficulty, incontinance    Cervical stenosis of spine 12/29/2021    GERD (gastroesophageal reflux disease)     History of stomach ulcers     Hypertension     Dr. Butler    Hypothyroidism     Lumbar neuritis     Post laminectomy syndrome     Spinal deformity 11/2021    cervical and lumbar    Thyroid disease     Uses wheelchair     Wears dentures     Wellness examination     Dr. Butler       Past Surgical History:        Procedure Laterality Date    BACK SURGERY      lower back x 3, cervical- x 1: C4- C6, 11/4/2014     BREAST SURGERY Right     mastectomy with reconstruction    COLONOSCOPY  about 2018    HERNIA REPAIR Bilateral     inguinal    HYSTERECTOMY, TOTAL ABDOMINAL      LUMBAR SPINE SURGERY N/A 12/30/2021    LUMBAR LAMINECTOMY L2-S1 performed by Aparna Frey DO at [facility] HISTORY  04/12/2022    C5 CORPECTOMY, USE OF INTRAOPERATIVE CERVICAL TRACTION (N/A Spine Cervical)        Allergies:     Allergies   Allergen Reactions    Latex Hives, Itching, Dermatitis and Rash    Kiwi Extract      Face swelling, some difficulty breathing        Current Medications:   Current Facility-Administered Medications: labetalol (NORMODYNE;TRANDATE) injection 10 mg, 10 mg, IntraVENous, Q4H PRN  Vitamin D (CHOLECALCIFEROL) tablet 5,000 Units, 5,000 Units, Oral, Weekly  methadone (DOLOPHINE) tablet 10 mg, 10 mg, Oral, BID  acetaminophen (TYLENOL) tablet 650 mg, 650 mg, Oral, Q6H  morphine (PF) injection 2 mg, 2 mg, IntraVENous, Q2H PRN **OR** morphine injection 4 mg, 4 mg, IntraVENous, Q2H PRN  cyclobenzaprine (FLEXERIL) tablet 10 mg, 10 mg, Oral, TID  polyethylene glycol (GLYCOLAX) packet 17 g, 17 g, Oral, Daily  senna (SENOKOT) tablet 8.6 mg, 1 tablet, Oral, Daily PRN  magnesium hydroxide (MILK OF MAGNESIA) 400 MG/5ML suspension 30 mL, 30 mL, Oral, Daily PRN  busPIRone (BUSPAR) tablet 30 mg, 30 mg, Oral, BID  dilTIAZem (CARDIZEM CD) extended release capsule 180 mg, 180 mg, Oral, Daily  docusate sodium (COLACE) capsule 200 mg, 200 mg, Oral, BID  ferrous sulfate (FE TABS 325) EC tablet 325 mg, 325 mg, Oral, BID WC  levothyroxine (SYNTHROID) tablet 88 mcg, 88 mcg, Oral, Daily  pantoprazole (PROTONIX) tablet 40 mg, 40 mg, Oral, Daily  tiZANidine (ZANAFLEX) tablet 4 mg, 4 mg, Oral, Q12H PRN  acetaminophen (TYLENOL) tablet 650 mg, 650 mg, Oral, Q4H PRN  ondansetron (ZOFRAN-ODT) disintegrating tablet 4 mg, 4 mg, Oral, Q8H PRN **OR** ondansetron (ZOFRAN) injection 4 mg, 4 mg, IntraVENous, Q6H PRN  oxyCODONE (ROXICODONE) immediate release tablet 5 mg, 5 mg, Oral, Q4H PRN **OR** oxyCODONE (ROXICODONE) immediate release tablet 10 mg, 10 mg, Oral, Q4H PRN  0.9 % sodium chloride infusion, , IntraVENous, Continuous    Family History:       Problem Relation Age of Onset    Hypertension Mother     Heart Disease Mother     Cancer Mother        Social History:  Lives With: Significant other,Family (Partner (Shaina Phillips) and 17 yo grandson Amparo Tucker))  Type of Home: House  Home Layout: Two level,Performs ADL's on one level,Able to Live on Main level with bedroom/bathroom (\"we don't go upstairs\")  Home Access: Stairs to enter without rails  Entrance Stairs - Number of Steps: 1 curb step (pt receives help getting up steps)  Entrance Stairs - Rails: None  Bathroom Shower/Tub: Tub/Shower unit,Shower chair with back  Bathroom Toilet: Handicap height  Bathroom Equipment: Grab bars in shower  Home Equipment: [address] wheeled walker (began using w/c last 2 weeks)  ADL Assistance: Needs assistance (Partner and grandson assist with bathing, dressing, activity: None   Other Topics Concern    None   Social History Narrative    None     Social Determinants of Health     Financial Resource Strain:     Difficulty of Paying Living Expenses: Not on file   Food Insecurity:     Worried About Running Out of Food in the Last Year: Not on file    [place] of Food in the Last Year: Not on file   Transportation Needs:     Lack of Transportation (Medical): Not on file    Lack of Transportation (Non-Medical): Not on file   Physical Activity: Inactive    Days of Exercise per Week: 0 days    Minutes of Exercise per Session: 0 min   Stress:     Feeling of Stress : Not on file   Social Connections:     Frequency of Communication with Friends and Family: Not on file    Frequency of Social Gatherings with Friends and Family: Not on file    Attends Faith Services: Not on file    Active Member of [address] [place] [address] [email or web address] or Organizations: Not on file    Attends Club or Organization Meetings: Not on file    Marital Status: Not on file   Intimate Partner Violence:     Fear of Current or Ex-Partner: Not on file    Emotionally Abused: Not on file    Physically Abused: Not on file    Sexually Abused: Not on file   Housing Stability:     Unable to Pay for Housing in the Last Year: Not on file    Number of [place] in the Last Year: Not on file    Unstable Housing in the Last Year: Not on file       Physical Exam:  BP (!) 128/112   Pulse 96   Temp 98.4 °F (36.9 °C) (Oral)   Resp 17   Ht 5' 1\" (1.549 m) Comment: FROM CHART  Wt 142 lb (64.4 kg) Comment: FROM CHART  SpO2 95%   BMI 26.83 kg/m²     GEN: Well developed, well nourished, no acute distress  HEENT: NCAT. Eyes closed throughout most of evaluation. Hearing grossly intact. Mucous membranes pink and moist.  RESP: Normal breath sounds with no wheezing, rales, or rhonchi. Respirations WNL and unlabored. CV: Regular rate and rhythm. No murmurs, rubs, or gallops.   ABD: Soft, non-distended, BS+ and equal.  NEURO:  Drowsy but arousable for short periods of time. Speech fluent. Sensation to light touch intact. MSK:  Muscle bulk is normal bilaterally. Strength 4/5 in bilateral upper limbs. Strength 4/5 with right ankle dorsiflexion and plantarflexion. No movement noted of the left foot. Appears to have left ankle plantarflexion contracture. LIMBS: No edema in bilateral lower limbs. SKIN: Warm and dry with good turgor. PSYCH: Mood WNL. Affect WNL. Diagnostics:    CBC:   Recent Labs     04/11/22 2111 04/12/22 0612 04/13/22 0441   WBC 6.3 4.8 15.8*   RBC 3.79* 3.48* 3.67*   HGB 10.6* 9.8* 10.2*   HCT 32.9* 30.8* 32.5*   MCV 86.8 88.5 88.6   RDW 16.3* 16.3* 17.0*    259 392     BMP:   Recent Labs     04/11/22 2111 04/12/22 0612 04/13/22 0441   * 136 126*   K 4.1 3.6* 4.0   CL 97* 105 89*   CO2 23 21 24   BUN 7* 6* 7*   CREATININE 0.62 0.57 0.71   GLUCOSE 88 78 142*      HbA1c: No results found for: LABA1C  BNP: No results for input(s): BNP in the last 72 hours. PT/INR:   Recent Labs     04/11/22 2111 04/12/22 0612   PROTIME 11.0 11.6   INR 1.0 1.1     APTT:   Recent Labs     04/11/22 2111 04/12/22 0612   APTT 22.9 23.6     CARDIAC ENZYMES: No results for input(s): CKMB, CKMBINDEX, TROPONINT in the last 72 hours. Invalid input(s): CKTOTAL;3  FASTING LIPID PANEL:  Lab Results   Component Value Date    CHOL 198 07/23/2020     (A) 07/23/2020    TRIG 96 07/23/2020     LIVER PROFILE:   Recent Labs     04/11/22 2111 04/13/22 0441   AST 25 31   ALT 13 13   BILITOT 0.33 0.37   ALKPHOS 94 78       Radiology:  XR CERVICAL SPINE (2-3 VIEWS)   Final Result   Expected postoperative findings status post anterior and posterior cervical   fusion. FLUORO FOR SURGICAL PROCEDURES   Final Result      FLUORO FOR SURGICAL PROCEDURES   Final Result      XR CERVICAL SPINE FLEXION AND EXTENSION   Final Result   1. Retrolisthesis of C2 on C3 measures 2 mm on extension view and reduces on   flexion view.    2. No evidence of instability of anterolisthesis of C3 on C4 measuring 1.5 mm.   3. No acute fracture on limited views. Of note, the C6-C7 and C7-T1 levels   are not well seen due to overlying soft tissues. 4. Severe multilevel degenerative changes. XR CHEST PORTABLE   Final Result   No acute cardiopulmonary disease. CTA NECK W CONTRAST   Final Result   Unremarkable CTA of the neck. Minor hazy pleural thickening and chronic-appearing medial right upper lobe   atelectasis/scarring. If there is any clinical concern, follow-up   noncontrast chest CT may be useful. RECOMMENDATIONS:   Unavailable               Impression:    1. Nontraumatic cervical spinal cord injury secondary to cervical stenosis s/p C5 corpectomy with C4-C6 arthrodesis, open reduction of cervical kyphotic deformity, and C2-C7 posterior fusion  2. History of cauda equina syndrome s/p L2-S1 laminectomy (12/2021)  3. Anemia  4. Hyponatremia  5. HTN  6. Hypothyroidism  7. GERD  8. History of breast cancer  9. Anxiety    Recommendations:    1. Diagnosis:  Nontraumatic cervical spinal cord injury secondary to cervical stenosis s/p C5 corpectomy with C4-C6 arthrodesis, open reduction of cervical kyphotic deformity, and C2-C7 posterior fusion  2. Therapy: Has PT/OT needs  3. Medical Necessity: As above  4. Support: Lives with significant other, grandson  5. Rehab Recommendation:  The patient will benefit from acute inpatient rehabilitation once medically stable per primary service. Anticipate she will be able to tolerate 3 hours of therapy per day in rehabilitation. The patient requires multidisciplinary rehabilitation treatment including medical management by a PM&R physician, 24 hour rehabilitation nursing, physical therapy, occupational therapy, rehabilitation social work, and nutrition services.  Patient and family also require education in post-hospital precautions and home exercise routine, adaptive techniques and deficit compensation strategies, strengthening and conditioning, equipment prescription and instructions in use. 6. DVT Prophylaxis: SCDs - If the patient is unable to be started on pharmacologic DVT prophylaxis, will need bilateral lower limb venous duplex within 24-48 hours of admission to acute rehab. It was my pleasure to evaluate Cesar Grey today. Please call with questions.     John Rosas MD

## 2022-04-13 NOTE — CONSULTS
[facility]  Office: [address], DO, Tena Paulino, DO, Sofi Montelongojose juan, DO, Adal Santacruz Blood, DO, Eufemia Watson MD, Benny Yoo MD, Eduardo Sams MD, Yolis Flores MD, Lynn Fowler MD, Delilah Graham MD, Freddie Go MD, Rebecca Lopez, DO, Sosa Aguilar, DO, Itzel Mclaughlin MD,  Kentrell Paz DO, Prudencio Lopez MD, Doron Ceja MD, Tina Rojas MD, Leopoldo Lindsey DO, Natalie Cervantes MD, Kevin Hernandez MD, Bebo Carranza, CNP, [facility], CNP, Fritz Smith, CNP, Remberto García, CNP, Albertina Freedman, CNP, Xander Jimenez, CNP, Raad Ellison PA-C, Sierra Moscoso, [facility], Earline Rasheed, CNP, Wendy Blair, CNP, Marcela Ellis, CNP, Terry Pool, CNS, Benito Faust, [facility], Gerry Briggs, CNP, Brittany Lux, CNP, Bobbi Mazariegos, [facility]         [address] / HISTORY AND PHYSICAL EXAMINATION            Date:   4/13/2022  Patient name:  Marta Galarza  Date of admission:  4/11/2022  7:03 PM  MRN:   [id number]  Account:  [de-identified]  YOB: 1960  PCP:    Alina Merritt MD  Room:   8447/5919-55  Code Status:    Full Code    Physician Requesting Consult: Patricia Taylor DO    Reason for Consult:  Medical management     Chief Complaint:     Lower extremity weakness     History Obtained From:     patient, electronic medical record    History of Present Illness: The patient is a 62 y.o. female presented for elective surgery. Underwent anterior C5 corpectomy, posterior fixation from C2-T1 with arthrodesis, osteotomy, correction of deformity on 4/12 with Dr. Prachi Busby on 4/12. Symptoms include significant left-sided paresis, severe lower extremity weakness (wheelchair bound, now unable to stand or ambulate [id number]), progressively worsening paresthesias and dexterity issues with left upper extremity and left lower extremity.   Additionally complains of some moderate saddle anesthesia, urge incontinence but no bowel dysfunction. IM consulted post op to assist with medical management.      Past Medical History:     Past Medical History:   Diagnosis Date    Anxiety     Arthritis     At maximum risk for fall 12/29/2021    caudal equina syndrome and cervical stenosis    Cancer (HCC)     right breast    Cauda equina syndrome ([address] 12/29/2021    neuropathy, mobility difficulty, incontinance    Cervical stenosis of spine 12/29/2021    GERD (gastroesophageal reflux disease)     History of stomach ulcers     Hypertension     Dr. Chitra Duarte    Hypothyroidism     Lumbar neuritis     Post laminectomy syndrome     Spinal deformity 11/2021    cervical and lumbar    Thyroid disease     Uses wheelchair     Wears dentures     Wellness examination     Dr. Chitra Duarte        Past Surgical History:     Past Surgical History:   Procedure Laterality Date    BACK SURGERY      lower back x 3, cervical- x 1: C4- C6, 11/4/2014     BREAST SURGERY Right     mastectomy with reconstruction    COLONOSCOPY  about 2018    HERNIA REPAIR Bilateral     inguinal    HYSTERECTOMY, TOTAL ABDOMINAL      LUMBAR SPINE SURGERY N/A 12/30/2021    LUMBAR LAMINECTOMY L2-S1 performed by Mariam Eason DO at [address] [place] [address] HISTORY  04/12/2022    C5 CORPECTOMY, USE OF INTRAOPERATIVE CERVICAL TRACTION (N/A Spine Cervical)         Medications Prior to Admission:     Prior to Admission medications    Medication Sig Start Date End Date Taking?  Authorizing Provider   dilTIAZem (DILACOR XR) 180 MG extended release capsule TAKE ONE CAPSULE BY MOUTH DAILY 4/1/22   Jose De Jesus MD   ferrous sulfate (IRON 325) 325 (65 Fe) MG tablet Take 1 tablet by mouth 2 times daily (with meals) 1/13/22   Jordan Troncoso MD   docusate sodium (COLACE) 100 MG capsule Take 2 capsules by mouth 2 times daily 1/13/22   Jordan Troncoso MD   pantoprazole (PROTONIX) 40 MG tablet TAKE ONE TABLET BY MOUTH DAILY 12/20/21   Vicki Holloway Shiloh Nunez MD   busPIRone (BUSPAR) 30 MG tablet Take 30 mg by mouth 2 times daily 12/17/21   Mal Moritz, MD   levothyroxine (SYNTHROID) 88 MCG tablet Take 1 tablet by mouth Daily 12/17/21 3/18/22  Mal Moritz, MD   tiZANidine (ZANAFLEX) 4 MG tablet Take 4 mg by mouth every 12 hours    Historical Provider, MD   Vitamin D, Ergocalciferol, 50 MCG (2000 UT) CAPS TAKE ONE CAPSULE BY MOUTH DAILY 3/29/21   Shubham Smith PA-C   HYDROcodone-acetaminophen [facility]) 7.5-325 MG per tablet Up to three tablets a day. 10/21/15   Historical Provider, MD   methadone (DOLOPHINE) 10 MG tablet Take 10 mg by mouth 2 times daily. Historical Provider, MD        Allergies:     Latex and Kiwi extract    Social History:     Tobacco:    reports that she quit smoking about 8 years ago. She has a 15.00 pack-year smoking history. She has never used smokeless tobacco.  Alcohol:      reports current alcohol use. Drug Use:  reports no history of drug use. Family History:     Family History   Problem Relation Age of Onset    Hypertension Mother     Heart Disease Mother     Cancer Mother        Review of Systems:     Positive and Negative as described in HPI. CONSTITUTIONAL:  negative for fevers, chills, sweats, fatigue, weight loss  HEENT:  negative for vision, hearing changes, runny nose, throat pain  RESPIRATORY:  negative for shortness of breath, cough, congestion, wheezing. CARDIOVASCULAR:  negative for chest pain, palpitations.   GASTROINTESTINAL:  negative for nausea, vomiting, diarrhea, constipation, change in bowel habits, abdominal pain   INTEGUMENT:  negative for rash, skin lesions, easy bruising   HEMATOLOGIC/LYMPHATIC:  negative for swelling/edema   ALLERGIC/IMMUNOLOGIC:  negative for urticaria , itching  ENDOCRINE:  negative increase in drinking, increase in urination, hot or cold intolerance  MUSCULOSKELETAL:  Positive for neck pain   NEUROLOGICAL:  negative for headaches, dizziness, lightheadedness, numbness, pain, tingling extremities  BEHAVIOR/PSYCH:  negative for depression, anxiety    Physical Exam:     BP (!) 149/80   Pulse 92   Temp 98.3 °F (36.8 °C) (Oral)   Resp 16   Ht 5' 1\" (1.549 m) Comment: FROM CHART  Wt 142 lb (64.4 kg) Comment: FROM CHART  SpO2 93%   BMI 26.83 kg/m²   Temp (24hrs), Av.1 °F (37.3 °C), Min:98.3 °F (36.8 °C), Max:100.8 °F (38.2 °C)    No results for input(s): POCGLU in the last 72 hours. Intake/Output Summary (Last 24 hours) at 2022 1315  Last data filed at 2022 1145  Gross per 24 hour   Intake 2040 ml   Output 4910 ml   Net -2870 ml       General Appearance:  alert, well appearing, and in no acute distress  Mental status: oriented to person, place, and time with normal affect  Head:  normocephalic, atraumatic.   Eye: no icterus, redness, pupils equal and reactive, extraocular eye movements intact, conjunctiva clear  Ear: normal external ear, no discharge, hearing intact  Nose:  no drainage noted  Mouth: mucous membranes moist  Lungs: Bilateral equal air entry, clear to ausculation, no wheezing, rales or rhonchi, normal effort  Cardiovascular: normal rate, regular rhythm  Abdomen: Soft, nontender, nondistended, normal bowel sounds  Neurologic: sensation of upper and lower extremities intact   Skin: No gross lesions, rashes, bruising or bleeding on exposed skin area  Extremities:  peripheral pulses palpable, no pedal edema or calf pain with palpation  Psych: normal affect    Investigations:      Laboratory Testing:  Recent Results (from the past 24 hour(s))   Comprehensive Metabolic Panel w/ Reflex to MG    Collection Time: 22  4:41 AM   Result Value Ref Range    Glucose 142 (H) 70 - 99 mg/dL    BUN 7 (L) 8 - 23 mg/dL    CREATININE 0.71 0.50 - 0.90 mg/dL    Calcium 8.6 8.6 - 10.4 mg/dL    Sodium 126 (L) 135 - 144 mmol/L    Potassium 4.0 3.7 - 5.3 mmol/L    Chloride 89 (L) 98 - 107 mmol/L    CO2 24 20 - 31 mmol/L    Anion Gap 13 9 - 17 mmol/L    Alkaline Phosphatase 78 35 - 104 U/L    ALT 13 5 - 33 U/L    AST 31 <32 U/L    Total Bilirubin 0.37 0.3 - 1.2 mg/dL    Total Protein 7.1 6.4 - 8.3 g/dL    Albumin 3.9 3.5 - 5.2 g/dL    Albumin/Globulin Ratio 1.2 1.0 - 2.5    GFR Non-African American >60 >60 mL/min    GFR African American >60 >60 mL/min    GFR Comment         CBC with Auto Differential    Collection Time: 04/13/22  4:41 AM   Result Value Ref Range    WBC 15.8 (H) 3.5 - 11.3 k/uL    RBC 3.67 (L) 3.95 - 5.11 m/uL    Hemoglobin 10.2 (L) 11.9 - 15.1 g/dL    Hematocrit 32.5 (L) 36.3 - 47.1 %    MCV 88.6 82.6 - 102.9 fL    MCH 27.8 25.2 - 33.5 pg    MCHC 31.4 28.4 - 34.8 g/dL    RDW 17.0 (H) 11.8 - 14.4 %    Platelets 560 490 - 010 k/uL    MPV 9.2 8.1 - 13.5 fL    NRBC Automated 0.0 0.0 per 100 WBC    RBC Morphology ANISOCYTOSIS PRESENT     Seg Neutrophils 87 (H) 36 - 65 %    Lymphocytes 5 (L) 24 - 43 %    Monocytes 7 3 - 12 %    Eosinophils % 0 (L) 1 - 4 %    Basophils 0 0 - 2 %    Immature Granulocytes 1 (H) 0 %    Segs Absolute 13.69 (H) 1.50 - 8.10 k/uL    Absolute Lymph # 0.82 (L) 1.10 - 3.70 k/uL    Absolute Mono # 1.15 0.10 - 1.20 k/uL    Absolute Eos # <0.03 0.00 - 0.44 k/uL    Basophils Absolute 0.03 0.00 - 0.20 k/uL    Absolute Immature Granulocyte 0.13 0.00 - 0.30 k/uL       Imaging/[employer]:  XR CERVICAL SPINE (2-3 VIEWS)    Result Date: 4/13/2022  Expected postoperative findings status post anterior and posterior cervical fusion. XR ANKLE LEFT (MIN 3 VIEWS)    Result Date: 4/6/2022  No acute fracture or dislocation. Mild soft tissue swelling about the ankle. MRI CERVICAL SPINE WO CONTRAST    Result Date: 4/6/2022  1. Severe spinal canal stenosis at C4-5 and C5-6, significantly worsened compared with the previous exam. 2. Intramedullary signal abnormality within the cervical spinal cord at the C5-6 level consistent with spondylotic myelopathy.  3. Severe multilevel degenerative changes, as described above with multilevel neural foraminal narrowing, severe at multiple levels, similar to the previous exam.     MRI LUMBAR SPINE WO CONTRAST    Result Date: 4/11/2022  Levoscoliosis and moderate to severe degenerative disc disease worst at L4-5 where there is severe left foraminal stenosis. XR CHEST PORTABLE    Result Date: 4/13/2022  No acute cardiopulmonary disease. XR CERVICAL SPINE FLEXION AND EXTENSION    Result Date: 4/13/2022  1. Retrolisthesis of C2 on C3 measures 2 mm on extension view and reduces on flexion view. 2. No evidence of instability of anterolisthesis of C3 on C4 measuring 1.5 mm. 3. No acute fracture on limited views. Of note, the C6-C7 and C7-T1 levels are not well seen due to overlying soft tissues. 4. Severe multilevel degenerative changes. CTA NECK W CONTRAST    Result Date: 4/12/2022  Unremarkable CTA of the neck. Minor hazy pleural thickening and chronic-appearing medial right upper lobe atelectasis/scarring. If there is any clinical concern, follow-up noncontrast chest CT may be useful.  RECOMMENDATIONS: Unavailable       Assessment :      Hospital Problems           Last Modified POA    * (Principal) Hemiplegia ([address] 4/11/2022 Yes    Stenosis of cervical spine with myelopathy ([address] 4/12/2022 Yes    Essential hypertension 4/13/2022 Yes    Hypothyroidism 4/13/2022 Yes    Back pain 4/11/2022 Yes    Incomplete quadriplegia at C5-6 level ([address] 4/12/2022 Yes    [place] neck deformity of cervical spine 4/12/2022 Yes    Acute cystitis without hematuria 4/13/2022 Yes          Plan:     - Vitals, labs, imaging, medications reviewed  - Home mediations reviewed  - Continue home meds for BP, hypothyroidism  - Follow up urine culture, adjust antibiotic pending sensitivities   - Monitor sodium   - Post op management per primary    Thank you for consult, call with questions       Consultations:   Valeri Morris MD  4/13/2022  1:15 PM    Copy sent to Dr. Vicki Holloway Stepehn Frederick MD

## 2022-04-13 NOTE — PROGRESS NOTES
Neurosurgery AILEEN/Resident    Daily Progress Note   CC:No chief complaint on file. 4/13/2022  8:47 AM    Chart reviewed. No acute events overnight. No new complaints. Resting in bed, reporting appropriate post op pain, TRAMAINE drains in place, has not have anything to eat since surgery she reports she does not have appetite, afebrile.     Vitals:    04/12/22 2017 04/13/22 0007 04/13/22 0457 04/13/22 0733   BP: (!) 145/85 (!) 180/95 (!) 177/95 (!) 170/90   Pulse: 97 104 112 109   Resp: 8 18 16 22   Temp: 98.5 °F (36.9 °C) 98.6 °F (37 °C) 98.7 °F (37.1 °C) 98.9 °F (37.2 °C)   TempSrc: Oral Oral Oral Oral   SpO2: 100% 100% 100% 96%   Weight:       Height:           PE:   AOx3  Motor   L deltoid 4-/5; R deltoid 4-/5  L biceps 5/5; R biceps 5/5  L triceps 5/5; R triceps 5/5  L wrist extension 4+/5; R wrist extension 4+/5  4/5 bilateral hand grasp     L iliopsoas 1/5 , R iliopsoas 5/5  L quadriceps 4+/5; R quadriceps 5/5  L Dorsiflexion 0/5; R dorsiflexion 5/5  L Plantarflexion 4+/5; R plantarflexion 5/5  L EHL 0/5; R EHL 5/5      Sensation: paresthesias BUE and BLE     Drain output: anterior TRAMAINE drain 5ml/12 hours  Posterior drain 195ml/12 hours- thin drainage   Incision: A/P clean dry intact       Lab Results   Component Value Date    WBC 15.8 (H) 04/13/2022    HGB 10.2 (L) 04/13/2022    HCT 32.5 (L) 04/13/2022     04/13/2022    CHOL 198 07/23/2020    TRIG 96 07/23/2020     (A) 07/23/2020    ALT 13 04/13/2022    AST 31 04/13/2022     (L) 04/13/2022    K 4.0 04/13/2022    CL 89 (L) 04/13/2022    CREATININE 0.71 04/13/2022    BUN 7 (L) 04/13/2022    CO2 24 04/13/2022    INR 1.1 04/12/2022    GLUF 109 07/23/2020           A/P  62 y.o. female who presents with cervical stenosis with myelopathy, cervical kyphotic deformity, incomplete quadriplegia       - HOB ok 30 degrees- no lower than that  - PT and OT  - PMR consulted for eval  - general diet- aspiration precautions   - SCD for DVT prophylaxis  - Neuro checks per floor protocol  - discontinue mario catheter and monitor output   - Medicine consulted   - aspen collar on while out of bed ok to remove while resting in bed and eating     Please contact neurosurgery with any changes in patients neurologic status.        Geri Flynn CNP  4/13/22  8:47 AM

## 2022-04-13 NOTE — PROGRESS NOTES
Physical Therapy    Facility/Department: [facility] 1C STEP DOWN  Initial Assessment    NAME: Mary Meng  : 1960  MRN: [id number]    Date of Service: 2022   S/p C2-C7 fixation. Discharge Recommendations: Further therapy recommended at discharge. The patient should be able to tolerate at least 3 hours of therapy per day over 5 days or 15 hours over 7 days. This patient may benefit from a Physical Medicine and Rehab consult. PT Equipment Recommendations  Equipment Needed: No (pt has wheelchair and RW.)    Assessment   Body structures, Functions, Activity limitations: Decreased functional mobility ; Decreased ADL status; Decreased strength;Decreased endurance;Decreased posture;Decreased balance  Assessment: The pt requires modx2 for supine>sit and maxAx2 for sit>supine. Therapist assisted pt to sit EOB to finish taking medications with RN, pt tolerated sitting EOB ~15 mins, but further transfers not attempted due to pain and pt vomiting. The pt will benefit from continued physical therapy to address deficits and increase independence. Prognosis: Good  Decision Making: Medium Complexity  PT Education: Goals;PT Role;Plan of Care;Transfer Training;General Safety; Functional Mobility Training  Barriers to Learning: none  REQUIRES PT FOLLOW UP: Yes  Activity Tolerance  Activity Tolerance: Patient Tolerated treatment well;Patient limited by pain       Patient Diagnosis(es): There were no encounter diagnoses. has a past medical history of Anxiety, Arthritis, At maximum risk for fall, Cancer ([address] Cauda equina syndrome (HCC), Cervical stenosis of spine, GERD (gastroesophageal reflux disease), History of stomach ulcers, Hypertension, Hypothyroidism, Lumbar neuritis, Post laminectomy syndrome, Spinal deformity, Thyroid disease, Uses wheelchair, Wears dentures, and Wellness examination. has a past surgical history that includes hernia repair (Bilateral); Breast surgery (Right);  Mastectomy, radical; Hysterectomy, total abdominal; Lumbar spine surgery (N/A, 12/30/2021); back surgery; Colonoscopy (about 2018); and other surgical history (04/12/2022). Restrictions  Restrictions/Precautions  Restrictions/Precautions: Fall Risk,General Precautions  Required Braces or Orthoses?: Yes  Required Braces or Orthoses  Cervical: c-collar (when OOB; ok to remove for eating, drinking, resting in bed)  Position Activity Restriction  Other position/activity restrictions: ambulate pt; s/p C2-C7 fixation  Vision/Hearing  Vision: Impaired  Vision Exceptions: Wears glasses for reading  Hearing: Within functional limits     Subjective  General  Patient assessed for rehabilitation services?: Yes  Response To Previous Treatment: Not applicable  Family / Caregiver Present: No  Follows Commands: Within Functional Limits  General Comment  Comments: 2 TRAMAINE drains present  Subjective  Subjective: Pt and RN agreeable to physical therapy this date. Pt pleasant and cooperative throughout session. Pain Screening  Patient Currently in Pain: Yes  Pain Assessment  Pain Assessment: 0-10  Pain Level: 7  Patient's Stated Pain Goal: No pain  Pain Type: Acute pain;Surgical pain  Pain Location: Neck  Pain Orientation: Mid;Posterior  Pain Descriptors: Aching;Discomfort; Headache  Pain Frequency: Continuous  Non-Pharmaceutical Pain Intervention(s): Ambulation/Increased Activity;Repositioned  Vital Signs  Patient Currently in Pain: Yes       Orientation  Orientation  Overall Orientation Status: Within Functional Limits  Social/Functional History  Social/Functional History  Lives With: Significant other,Family (Partner Aisha Jara) and 17 yo grandson Carlton Stinson))  Type of Home: House  Home Layout: Two level,Performs ADL's on one level,Able to Live on Main level with bedroom/bathroom (\"we don't go upstairs\")  Home Access: Stairs to enter without rails  Entrance Stairs - Number of Steps: 1 curb step (pt receives help getting up steps)  Entrance Stairs - Rails: None  Bathroom Shower/Tub: Tub/Shower unit,Shower chair with back  Bathroom Toilet: Handicap height  Bathroom Equipment: Grab bars in shower  Home Equipment: [employer] walker,4 wheeled walker (began using w/c last 2 weeks)  ADL Assistance: Needs assistance (Partner and grandson assist with bathing, dressing, grooming, toileting)  Homemaking Assistance: Needs assistance  Homemaking Responsibilities: No (pt significant other and grandson perform)  Meal Prep Responsibility: Secondary  Laundry Responsibility: Secondary  Cleaning Responsibility: Secondary  Shopping Responsibility: Secondary  Ambulation Assistance: Needs assistance (wheelchair)  Transfer Assistance: Needs assistance (pt grandson and partner assist)  Active : No  Patient's  Info: pt partner and grandson drive  Mode of Transportation: Car,Truck (Pt reports partner and grandson assist with vehicle transfers from w/c)  Occupation: On disability  Leisure & Hobbies: watching television  IADL Comments: pt sleeps in recliner at baseline  Additional Comments: Pt reports needing max assist for all bathing, dressing, toileting, and self-care ADLs for approximately 2 weeks prior to admission; pt needed some assistance with ADLs >2 weeks ago. Pt reports sponge-bathing in bathroom at baseline, and sleeping in a recliner chair on the first floor at baseline. Pt now uses w/c for all func mob in community and around house for ADL copmletion, receiving assist from partner and grandson for mobility and transfers; pt reports using RW and standard walker >2 weeks ago for func mob. Pt partner works full time days and grandson is currently in online school.   Cognition   Cognition  Overall Cognitive Status: WFL    Objective     Observation/Palpation  Posture: Fair    Joint Mobility  Spine: WFL with C-collar on  ROM RLE: WFL  ROM LLE: WFL  ROM RUE: WFL-within post op precautions  ROM LUE: WFL-within post op precautions  Strength RLE  Strength RLE: WFL  Comment: grossly 4/5  Strength LLE  Strength LLE: WFL  Comment: grossly 4/5  Strength RUE  Strength RUE: WFL  Comment: formally assessed by OT  Strength LUE  Strength LUE: WFL  Comment: formally assessed by OT  Tone RLE  RLE Tone: Normotonic  Motor Control  Gross Motor?: WNL  Coordination  Rapid Alternating Movements: Normal  Sensation  Overall Sensation Status: Impaired (Pt reports numbness and decreased sensation in L hand)  Bed mobility  Supine to Sit: Moderate assistance;2 Person assistance (HOB elevated; pt sleeps in recliner at baseline; requires assist for trunk)  Sit to Supine: Maximum assistance;2 Person assistance (for trunk and LE progression)  Scooting: Minimal assistance  Comment: pt sat EOB ~15 mins. C-collar donned with max assist prior to mobility. Transfers  Comment: further transfers not performed this date secondary to pt c/o pain and vomiting after taking pills with RN present. Ambulation  Ambulation?: No     Balance  Posture: Fair  Sitting - Static: Fair;- (pt required Miryam progressing to CGA at times.)  Sitting - Dynamic: Poor;+  Comments: standing not attempted this date d/t pain and dec activity tolerance        Plan   Plan  Times per week: 5-6x/week  Current Treatment Recommendations: Strengthening,ROM,Balance Training,Endurance Training,Functional Mobility Training,Transfer Training,ADL/Self-care Training,Stair training,Gait [employer] Exercise Program,Safety Education & Training,Patient/Caregiver Education & Training,Equipment Evaluation, Education, & procurement  Safety Devices  Type of devices:  All fall risk precautions in place,Bed alarm in place,Call light within reach,Gait belt,Patient at risk for falls,Left in bed,Nurse notified  Restraints  Initially in place: No      AM-PAC Score  AM-PAC Inpatient Mobility Raw Score : 10 (04/13/22 1158)  AM-PAC Inpatient T-Scale Score : 32.29 (04/13/22 1158)  Mobility Inpatient CMS 0-100% Score: 76.75 (04/13/22 1158)  Mobility Inpatient CMS G-Code Modifier : CL (04/13/22 1158)        Goals  Short term goals  Time Frame for Short term goals: 14 visits  Short term goal 1: Pt will perform bed mobility with SBA  Short term goal 2: Pt will perform transfers with Miryam  Short term goal 3: Pt will ambulate 50 ft, least restrictive AD, Miryam  Short term goal 4: Pt will perform wheelchair mobility for 150 ft, SBA  Short term goal 5: Pt will tolerate 30 mins physical therapy to promote in activity tolerance. Therapy Time   Individual Concurrent Group Co-treatment   Time In 0831         Time Out 0910         Minutes 39         Timed Code Treatment Minutes: 10 Minutes       Isi Rivera, SPT   Evaluation/treatment performed by Student PT under the supervision of co-signing PT who agrees with all evaluation/treatment and documentation.

## 2022-04-13 NOTE — PLAN OF CARE
Problem: Falls - Risk of:  Goal: Will remain free from falls  Description: Will remain free from falls  4/13/2022 0751 by Meseret José RN  Outcome: Ongoing  4/13/2022 0559 by Dona Cordoba RN  Outcome: Ongoing  Goal: Absence of physical injury  Description: Absence of physical injury  4/13/2022 0751 by Meseret José RN  Outcome: Ongoing  4/13/2022 0559 by Dona Cordoba RN  Outcome: Ongoing     Problem: Pain:  Goal: Pain level will decrease  Description: Pain level will decrease  4/13/2022 0751 by Meseret José RN  Outcome: Ongoing  4/13/2022 0559 by Dona Cordoba RN  Outcome: Ongoing  Goal: Control of acute pain  Description: Control of acute pain  4/13/2022 0751 by Meseret José RN  Outcome: Ongoing  4/13/2022 0559 by Dona Cordoba RN  Outcome: Ongoing  Goal: Control of chronic pain  Description: Control of chronic pain  4/13/2022 0751 by Meseret José RN  Outcome: Ongoing  4/13/2022 0559 by Dona Cordoba RN  Outcome: Ongoing     Problem: Skin Integrity:  Goal: Will show no infection signs and symptoms  Description: Will show no infection signs and symptoms  4/13/2022 0751 by Meseret José RN  Outcome: Ongoing  4/13/2022 0559 by Dona Cordoba RN  Outcome: Ongoing  Goal: Absence of new skin breakdown  Description: Absence of new skin breakdown  4/13/2022 0751 by Meseret José RN  Outcome: Ongoing  4/13/2022 0559 by Dona Cordoba RN  Outcome: Ongoing

## 2022-04-13 NOTE — PROGRESS NOTES
Occupational Therapy   Occupational Therapy Initial Assessment  Date: 2022   Patient Name: Santi Polk  MRN: [id number]     : 1960    Date of Service: 2022  S/p C5 Cordectomy, Posterior Fixation from C2-T1 ()    Discharge Recommendations: Further therapy recommended at discharge. The patient should be able to tolerate at least 3 hours of therapy per day over 5 days or 15 hours over 7 days. This patient may benefit from a Physical Medicine and Rehab consult. Patient would benefit from continued therapy after discharge  OT Equipment Recommendations  Equipment Needed: Yes  Mobility Devices: ADL Assistive Devices  ADL Assistive Devices: Toileting - 3-in-1 Commode;Transfer Tub Bench;Grab Bars - toilet;Sock-Aid Hard;Reacher    Assessment   Performance deficits / Impairments: Decreased functional mobility ; Decreased endurance;Decreased coordination;Decreased ADL status; Decreased sensation;Decreased balance;Decreased ROM; Decreased strength;Decreased high-level IADLs  Assessment: Pt would benefit from further skilled OT to address deficits and increase safety and independence with daily activities. Treatment Diagnosis: C5 Cordectomy, Posterior Fixation from C2-T1 ()  Prognosis: Good  Decision Making: Medium Complexity  Patient Education: Pt educated on OT Role, Plan of Care, importance of movement and repositioning, cervical precautions during UE movement and mobility, breathing technique to manage pain. Good return by pt  REQUIRES OT FOLLOW UP: Yes  Activity Tolerance  Activity Tolerance: Patient limited by fatigue;Patient limited by pain  Activity Tolerance: Pt limited in sitting and activity tolerance EOB d/t fatigue, surgical pain in neck, and nausea with vomiting. Pt able to continue session supine in bed with HOB raised but continued to be limited by pain and fatigue. Safety Devices  Safety Devices in place: Yes  Type of devices: Patient at risk for falls;Call light within reach; Left in bed  Restraints  Initially in place: No         Patient Diagnosis(es): There were no encounter diagnoses. has a past medical history of Anxiety, Arthritis, At maximum risk for fall, Cancer ([facility] [address] Cauda equina syndrome (HCC), Cervical stenosis of spine, GERD (gastroesophageal reflux disease), History of stomach ulcers, Hypertension, Hypothyroidism, Lumbar neuritis, Post laminectomy syndrome, Spinal deformity, Thyroid disease, Uses wheelchair, Wears dentures, and Wellness examination. has a past surgical history that includes hernia repair (Bilateral); Breast surgery (Right); Mastectomy, radical; Hysterectomy, total abdominal; Lumbar spine surgery (N/A, 12/30/2021); back surgery; Colonoscopy (about 2018); and other surgical history (04/12/2022). Treatment Diagnosis: C5 Cordectomy, Posterior Fixation from C2-T1      Restrictions  Restrictions/Precautions  Restrictions/Precautions: Fall Risk,General Precautions  Required Braces or Orthoses?: Yes  Required Braces or Orthoses  Cervical: c-collar (Cervical collar on while out of bed, ok to remove while eating, drinking and resting in bed)    Subjective   General  Patient assessed for rehabilitation services?: Yes  Family / Caregiver Present: No  Diagnosis: C5 Cordectomy, Posterior Fixation from C2-T1  Patient Currently in Pain: Yes  Pain Assessment  Pain Assessment: 0-10  Pain Level: 7  Pain Type: Acute pain;Surgical pain  Pain Location: Neck  Pain Orientation: Mid  Pain Descriptors: Aching;Discomfort  Pain Frequency: Continuous  Functional Pain Assessment: Prevents or interferes some active activities and ADLs  Non-Pharmaceutical Pain Intervention(s): Distraction; Therapeutic presence  Response to Pain Intervention: Patient Satisfied  Vital Signs  Patient Currently in Pain: Yes  Oxygen Therapy  O2 Device: None (Room air)  Social/Functional History  Social/Functional History  Lives With: Significant other,Family (Partner (Emmett) and 15 yo grandson Darlin John)  Type of Home: House  Home Layout: Two level,Performs ADL's on one level,Able to Live on Main level with bedroom/bathroom (\"we don't go upstairs\")  Home Access: Stairs to enter without rails  Entrance Stairs - Number of Steps: 1 curb step (pt receives help getting up steps)  Entrance Stairs - Rails: None  Bathroom Shower/Tub: Tub/Shower unit,Shower chair with back  Bathroom Toilet: Handicap height  Bathroom Equipment: Grab bars in shower  Home Equipment: [address] wheeled walker (began using w/c last 2 weeks)  ADL Assistance: Needs assistance (Partner and grandson assist with bathing, dressing, grooming, toileting)  Homemaking Assistance: Needs assistance  Homemaking Responsibilities: No (pt significant other and grandson perform)  Meal Prep Responsibility: Secondary  Laundry Responsibility: Secondary  Cleaning Responsibility: Secondary  Shopping Responsibility: Secondary  Ambulation Assistance: Needs assistance (wheelchair)  Transfer Assistance: Needs assistance (pt grandson and partner assist)  Active : No  Patient's  Info: pt partner and grandson drive  Mode of Transportation: Car,Truck (Pt reports partner and grandson assist with vehicle transfers from w/c)  Occupation: On disability  Leisure & Hobbies: watching television  Additional Comments: Pt reports she needed max assist for all bathing, dressing, toileting, and self-care ADLs for approximately 2 weeks prior to admission; pt needed some assistance with ADLs >2 weeks ago. Pt reports she sponge-bathed in bathroom at baseline, and she sleeps in a recliner chair on the first floor at baseline. Pt now uses w/c for all func mob in community and around house for ADL copmletion, receiving assist from partner and grandson for mobility and transfers; pt reports using RW and standard walker >2 weeks ago for func mob. Pt partner works full time days and grandson is currently in online school.      Objective   Vision: Impaired  Vision Exceptions: Wears glasses for reading (Glasses not worn during session)  Hearing: Within functional limits    Orientation  Overall Orientation Status: Within Functional Limits     Balance  Sitting Balance: Minimal assistance (Progressing to CGA; assist for trunk support, sitting EOB with feet on floor for UB dressing and taking pills ~8 min)  Standing Balance: Unable to assess(comment) (d/t increased surgical pain, nausea with vomiting, and BLE weakness and deficits)  ADL  Feeding: Setup;Verbal cueing; Increased time to complete;Contact guard assistance  Grooming: Minimal assistance;Setup;Verbal cueing; Increased time to complete  UE Bathing: Moderate assistance;Verbal cueing;Setup; Increased time to complete  LE Bathing: Maximum assistance;Verbal cueing;Setup; Increased time to complete  UE Dressing: Moderate assistance  LE Dressing: Maximum assistance;Verbal cueing;Setup; Increased time to complete  Toileting: Maximum assistance; Increased time to complete  Additional Comments: Pt donned hospital gown seated EOB with mod A for threading arms through sleeves and dynamic sitting balance; pt reported increase in surgical pain in neck with UB dressing. Pt took pills with setup using RUE and good coordination noted, CGA for sitting balance, and VC for swallowing with c-collar. Pt allowed to tuck chin w/o c-collar donned to consume food and liquids per order. Tone RUE  RUE Tone: Normotonic  Tone LUE  LUE Tone: Normotonic  Coordination  Movements Are Fluid And Coordinated: No  Coordination and Movement description: Decreased speed;Left UE     Bed mobility  Supine to Sit: 2 Person assistance; Moderate assistance  Sit to Supine: 2 Person assistance;Maximum assistance  Scooting: Minimal assistance  Comment: Assist for BLE and trunk progression. Pt supine in bed upon arrival with [facility] elevated to ~30 degrees. Pt completed supine to sit transfer EOB to take pills, don new hospital gown, and for LB ROM/MMT assessment.  Pt then Short term goals: Pt will by discharge  Short term goal 1: Demo mod A for LB dressing/bathing tasks, with AE/adaptive strategies PRN and 1 VC. Short term goal 2: Demo CGA for UB dressing/bathing tasks, including c-collar PRN, with AE/adaptive strategies PRN and 1 VC. Short term goal 3: Demo min A for all bed mobility while adhering to cervical precautions for increased ADL independence and performance. Short term goal 4: Demo 15+ min dynamic sitting balance SUP during functional activity, with 1 or fewer rest breaks. Short term goal 5: Demo understanding and use of pursed-lip breathing technique during func activities/ADLs to address pain and fatigue with 0 VC. Short term goal 6: Collaborate with OTR to add func mob/transfer goal as appropriate.      Therapy Time   Individual Concurrent Group Co-treatment   Time In 0828         Time Out 0905         Minutes 37         Timed Code Treatment Minutes: 23 Minutes   Co-eval with PT    AYSE Denny

## 2022-04-14 LAB
ANION GAP SERPL CALCULATED.3IONS-SCNC: 12 MMOL/L (ref 9–17)
BUN BLDV-MCNC: 10 MG/DL (ref 8–23)
CALCIUM SERPL-MCNC: 8.6 MG/DL (ref 8.6–10.4)
CHLORIDE BLD-SCNC: 94 MMOL/L (ref 98–107)
CO2: 24 MMOL/L (ref 20–31)
CREAT SERPL-MCNC: 0.55 MG/DL (ref 0.5–0.9)
CULTURE: ABNORMAL
GFR AFRICAN AMERICAN: >60 ML/MIN
GFR NON-AFRICAN AMERICAN: >60 ML/MIN
GFR SERPL CREATININE-BSD FRML MDRD: ABNORMAL ML/MIN/{1.73_M2}
GLUCOSE BLD-MCNC: 101 MG/DL (ref 70–99)
GLUCOSE BLD-MCNC: 130 MG/DL (ref 65–105)
HCT VFR BLD CALC: 27.3 % (ref 36.3–47.1)
HEMOGLOBIN: 8.6 G/DL (ref 11.9–15.1)
OSMOLALITY URINE: 338 MOSM/KG (ref 80–1300)
POTASSIUM SERPL-SCNC: 3.4 MMOL/L (ref 3.7–5.3)
SERUM OSMOLALITY: 280 MOSM/KG (ref 275–295)
SODIUM BLD-SCNC: 130 MMOL/L (ref 135–144)
SODIUM,UR: 92 MMOL/L
SPECIMEN DESCRIPTION: ABNORMAL
TROPONIN, HIGH SENSITIVITY: 26 NG/L (ref 0–14)
TROPONIN, HIGH SENSITIVITY: 35 NG/L (ref 0–14)

## 2022-04-14 PROCEDURE — 97530 THERAPEUTIC ACTIVITIES: CPT

## 2022-04-14 PROCEDURE — 2580000003 HC RX 258: Performed by: NURSE PRACTITIONER

## 2022-04-14 PROCEDURE — 82947 ASSAY GLUCOSE BLOOD QUANT: CPT

## 2022-04-14 PROCEDURE — 6370000000 HC RX 637 (ALT 250 FOR IP): Performed by: NURSE PRACTITIONER

## 2022-04-14 PROCEDURE — 2580000003 HC RX 258: Performed by: REGISTERED NURSE

## 2022-04-14 PROCEDURE — 99222 1ST HOSP IP/OBS MODERATE 55: CPT | Performed by: NURSE PRACTITIONER

## 2022-04-14 PROCEDURE — 6370000000 HC RX 637 (ALT 250 FOR IP): Performed by: REGISTERED NURSE

## 2022-04-14 PROCEDURE — 2060000000 HC ICU INTERMEDIATE R&B

## 2022-04-14 PROCEDURE — 97110 THERAPEUTIC EXERCISES: CPT

## 2022-04-14 PROCEDURE — 6360000002 HC RX W HCPCS: Performed by: NURSE PRACTITIONER

## 2022-04-14 PROCEDURE — 83935 ASSAY OF URINE OSMOLALITY: CPT

## 2022-04-14 PROCEDURE — 85014 HEMATOCRIT: CPT

## 2022-04-14 PROCEDURE — 84484 ASSAY OF TROPONIN QUANT: CPT

## 2022-04-14 PROCEDURE — 83930 ASSAY OF BLOOD OSMOLALITY: CPT

## 2022-04-14 PROCEDURE — 36415 COLL VENOUS BLD VENIPUNCTURE: CPT

## 2022-04-14 PROCEDURE — 99211 OFF/OP EST MAY X REQ PHY/QHP: CPT

## 2022-04-14 PROCEDURE — 85018 HEMOGLOBIN: CPT

## 2022-04-14 PROCEDURE — 80048 BASIC METABOLIC PNL TOTAL CA: CPT

## 2022-04-14 PROCEDURE — 99233 SBSQ HOSP IP/OBS HIGH 50: CPT | Performed by: INTERNAL MEDICINE

## 2022-04-14 PROCEDURE — APPSS30 APP SPLIT SHARED TIME 16-30 MINUTES: Performed by: NURSE PRACTITIONER

## 2022-04-14 PROCEDURE — 84300 ASSAY OF URINE SODIUM: CPT

## 2022-04-14 RX ORDER — BACITRACIN, NEOMYCIN, POLYMYXIN B 400; 3.5; 5 [USP'U]/G; MG/G; [USP'U]/G
OINTMENT TOPICAL 2 TIMES DAILY
Status: DISCONTINUED | OUTPATIENT
Start: 2022-04-14 | End: 2022-04-15 | Stop reason: HOSPADM

## 2022-04-14 RX ORDER — SULFAMETHOXAZOLE AND TRIMETHOPRIM 800; 160 MG/1; MG/1
1 TABLET ORAL EVERY 12 HOURS SCHEDULED
Status: DISCONTINUED | OUTPATIENT
Start: 2022-04-14 | End: 2022-04-15

## 2022-04-14 RX ORDER — METHOCARBAMOL 750 MG/1
750 TABLET, FILM COATED ORAL 4 TIMES DAILY PRN
Status: DISCONTINUED | OUTPATIENT
Start: 2022-04-14 | End: 2022-04-15 | Stop reason: HOSPADM

## 2022-04-14 RX ORDER — 0.9 % SODIUM CHLORIDE 0.9 %
2000 INTRAVENOUS SOLUTION INTRAVENOUS ONCE
Status: COMPLETED | OUTPATIENT
Start: 2022-04-14 | End: 2022-04-14

## 2022-04-14 RX ADMIN — BUSPIRONE HYDROCHLORIDE 30 MG: 15 TABLET ORAL at 09:08

## 2022-04-14 RX ADMIN — ACETAMINOPHEN 650 MG: 325 TABLET ORAL at 09:02

## 2022-04-14 RX ADMIN — ACETAMINOPHEN 650 MG: 325 TABLET ORAL at 20:26

## 2022-04-14 RX ADMIN — DILTIAZEM HYDROCHLORIDE 180 MG: 180 CAPSULE, COATED, EXTENDED RELEASE ORAL at 09:10

## 2022-04-14 RX ADMIN — ENOXAPARIN SODIUM 40 MG: 40 INJECTION SUBCUTANEOUS at 09:13

## 2022-04-14 RX ADMIN — POLYMYXIN B SULFATE, BACITRACIN ZINC, NEOMYCIN SULFATE: 5000; 3.5; 4 OINTMENT TOPICAL at 20:32

## 2022-04-14 RX ADMIN — POLYETHYLENE GLYCOL 3350 17 G: 17 POWDER, FOR SOLUTION ORAL at 09:04

## 2022-04-14 RX ADMIN — SODIUM CHLORIDE: 9 INJECTION, SOLUTION INTRAVENOUS at 14:17

## 2022-04-14 RX ADMIN — OXYCODONE 10 MG: 5 TABLET ORAL at 07:29

## 2022-04-14 RX ADMIN — DOCUSATE SODIUM 200 MG: 100 CAPSULE ORAL at 20:30

## 2022-04-14 RX ADMIN — ACETAMINOPHEN 650 MG: 325 TABLET ORAL at 14:35

## 2022-04-14 RX ADMIN — FERROUS SULFATE TAB EC 325 MG (65 MG FE EQUIVALENT) 325 MG: 325 (65 FE) TABLET DELAYED RESPONSE at 09:10

## 2022-04-14 RX ADMIN — SULFAMETHOXAZOLE AND TRIMETHOPRIM 1 TABLET: 800; 160 TABLET ORAL at 11:49

## 2022-04-14 RX ADMIN — FERROUS SULFATE TAB EC 325 MG (65 MG FE EQUIVALENT) 325 MG: 325 (65 FE) TABLET DELAYED RESPONSE at 16:21

## 2022-04-14 RX ADMIN — BUSPIRONE HYDROCHLORIDE 30 MG: 15 TABLET ORAL at 20:30

## 2022-04-14 RX ADMIN — DOCUSATE SODIUM 200 MG: 100 CAPSULE ORAL at 09:07

## 2022-04-14 RX ADMIN — SODIUM CHLORIDE 2000 ML: 9 INJECTION, SOLUTION INTRAVENOUS at 11:45

## 2022-04-14 RX ADMIN — METHADONE HYDROCHLORIDE 10 MG: 10 TABLET ORAL at 20:31

## 2022-04-14 RX ADMIN — SULFAMETHOXAZOLE AND TRIMETHOPRIM 1 TABLET: 800; 160 TABLET ORAL at 20:30

## 2022-04-14 RX ADMIN — LEVOTHYROXINE SODIUM 88 MCG: 88 TABLET ORAL at 09:09

## 2022-04-14 RX ADMIN — TIZANIDINE 4 MG: 4 TABLET ORAL at 09:10

## 2022-04-14 RX ADMIN — CYCLOBENZAPRINE 10 MG: 10 TABLET, FILM COATED ORAL at 09:09

## 2022-04-14 RX ADMIN — OXYCODONE 10 MG: 5 TABLET ORAL at 20:26

## 2022-04-14 RX ADMIN — MORPHINE SULFATE 4 MG: 4 INJECTION INTRAVENOUS at 09:02

## 2022-04-14 RX ADMIN — OXYCODONE 10 MG: 5 TABLET ORAL at 14:35

## 2022-04-14 RX ADMIN — METHADONE HYDROCHLORIDE 10 MG: 10 TABLET ORAL at 12:04

## 2022-04-14 RX ADMIN — MORPHINE SULFATE 4 MG: 4 INJECTION INTRAVENOUS at 04:07

## 2022-04-14 RX ADMIN — PANTOPRAZOLE SODIUM 40 MG: 40 TABLET, DELAYED RELEASE ORAL at 09:10

## 2022-04-14 RX ADMIN — OXYCODONE 10 MG: 5 TABLET ORAL at 02:55

## 2022-04-14 RX ADMIN — ACETAMINOPHEN 650 MG: 325 TABLET ORAL at 02:55

## 2022-04-14 ASSESSMENT — PAIN DESCRIPTION - LOCATION
LOCATION: NECK
LOCATION: HEAD;NECK
LOCATION: NECK

## 2022-04-14 ASSESSMENT — PAIN DESCRIPTION - PAIN TYPE
TYPE: ACUTE PAIN;SURGICAL PAIN

## 2022-04-14 ASSESSMENT — PAIN DESCRIPTION - ONSET
ONSET: ON-GOING
ONSET: ON-GOING

## 2022-04-14 ASSESSMENT — PAIN SCALES - GENERAL
PAINLEVEL_OUTOF10: 9
PAINLEVEL_OUTOF10: 8
PAINLEVEL_OUTOF10: 9
PAINLEVEL_OUTOF10: 8
PAINLEVEL_OUTOF10: 10
PAINLEVEL_OUTOF10: 9
PAINLEVEL_OUTOF10: 9
PAINLEVEL_OUTOF10: 8
PAINLEVEL_OUTOF10: 9
PAINLEVEL_OUTOF10: 8
PAINLEVEL_OUTOF10: 10
PAINLEVEL_OUTOF10: 8

## 2022-04-14 ASSESSMENT — PAIN DESCRIPTION - DESCRIPTORS
DESCRIPTORS: ACHING;DISCOMFORT
DESCRIPTORS: ACHING;DISCOMFORT;SORE;HEAVINESS
DESCRIPTORS: ACHING
DESCRIPTORS: ACHING;DISCOMFORT

## 2022-04-14 ASSESSMENT — PAIN DESCRIPTION - FREQUENCY
FREQUENCY: CONTINUOUS

## 2022-04-14 ASSESSMENT — PAIN DESCRIPTION - ORIENTATION
ORIENTATION: ANTERIOR;POSTERIOR
ORIENTATION: ANTERIOR;POSTERIOR

## 2022-04-14 NOTE — CONSULTS
[facility]  Initial Consult Note  Date: 4/14/2022    Hospital day :3     Impression:   1. [place]-neck deformity of cervical spine  2. Cervical stenosis with myelopathy  · Status post anterior C5 corpectomy, posterior fixation from C5-T1 with arthrodesis, osteotomy, correction of deformity  3. Incomplete quadriplegia at level C5-6  4. Staph epidermidis urinary tract infection    Recommendations   · Patient has been started on oral Bactrim  · She can continue on the antibiotics for a short course  · Continue supportive care    Chief complaint/reason for consultation:   UTI    History of Present Illness:   Brandi Wang is a 62y.o.-year-old female who was initially admitted on 4/11/2022. Patient has noted medical history of cervical spine swan-neck deformity and cord compression. She has been experiencing back pain for over 10 years and has had previous surgeries including C3-C4 fusion as well as lumbar laminectomy L2-S1 for cauda equina decompression on 12/30/2021. She has been dealing with bilateral lower extremity and upper extremity weakness/numbness on and off for several months/years. After her surgery in December, symptoms had initially improved with therapy; however, she has since regressed. She did have an MRI 4/6/2022 which showed C-spine collapse of C5 vertebral body with progressive swan-neck deformity and significant cord compression, despite previous cervical decompression. 4/12/2022 patient underwent anterior C5 corpectomy, posterior fixation from C2-T1 with arthrodesis, osteotomy, correction of deformity. Urine culture has grown Staph epidermidis. Patient is initiated on Bactrim. She does tell me that she had urinary frequency prior to admission. No necessary dysuria, just feeling like she had to go to the bathroom multiple times. No fevers or chills. No abdominal pain or back pain. We have been consulted assist with antimicrobial therapy for the UTI.     I have personally reviewed the past medical history, past surgical history, medications, social history, and family history, and I have updated the database accordingly.   Past Medical History:     Past Medical History:   Diagnosis Date    Anxiety     Arthritis     At maximum risk for fall 12/29/2021    caudal equina syndrome and cervical stenosis    Cancer ([facility] [address]     right breast    Cauda equina syndrome ([facility] [address] 12/29/2021    neuropathy, mobility difficulty, incontinance    Cervical stenosis of spine 12/29/2021    GERD (gastroesophageal reflux disease)     History of stomach ulcers     Hypertension     Dr. Jeannie Knutson    Hypothyroidism     Lumbar neuritis     Post laminectomy syndrome     Spinal deformity 11/2021    cervical and lumbar    Thyroid disease     Uses wheelchair     Wears dentures     Wellness examination     Dr. Jeannie Knutson     Past Surgical  History:     Past Surgical History:   Procedure Laterality Date    BACK SURGERY      lower back x 3, cervical- x 1: C4- C6, 11/4/2014     BREAST SURGERY Right     mastectomy with reconstruction    CERVICAL FUSION N/A 4/12/2022    C5 CORPECTOMY, USE OF INTRAOPERATIVE CERVICAL TRACTION performed by Althea Yang DO at [address] 4/12/2022    POSTERIOR FIXATION C2-C7 performed by Althea Yang DO at [facility]  about 2018    HERNIA REPAIR Bilateral     inguinal    HYSTERECTOMY, TOTAL ABDOMINAL      LUMBAR SPINE SURGERY N/A 12/30/2021    LUMBAR LAMINECTOMY L2-S1 performed by [facility]thea Yang DO at [facility]      OTHER SURGICAL HISTORY  04/12/2022    C5 CORPECTOMY, USE OF INTRAOPERATIVE CERVICAL TRACTION (N/A Spine Cervical)      Medications:      sulfamethoxazole-trimethoprim  1 tablet Oral 2 times per day    enoxaparin  40 mg SubCUTAneous Daily    Vitamin D  5,000 Units Oral Weekly    methadone  10 mg Oral BID    acetaminophen  650 mg Oral Q6H    cyclobenzaprine  10 mg Oral TID    polyethylene glycol  17 g Oral Daily    busPIRone  30 mg Oral BID    dilTIAZem  180 mg Oral Daily    docusate sodium  200 mg Oral BID    ferrous sulfate  325 mg Oral BID WC    levothyroxine  88 mcg Oral Daily    pantoprazole  40 mg Oral Daily     Social History:     Social History     Socioeconomic History    Marital status: Single     Spouse name: Not on file    Number of children: Not on file    Years of education: Not on file    Highest education level: Not on file   Occupational History    Not on file   Tobacco Use    Smoking status: Former Smoker     Packs/day: 0.50     Years: 30.00     Pack years: 15.00     Quit date:      Years since quittin.2    Smokeless tobacco: Never Used   Vaping Use    Vaping Use: Every day    Substances: Nicotine   Substance and Sexual Activity    Alcohol use: Yes     Alcohol/week: 0.0 standard drinks     Comment: weekend at times    Drug use: No    Sexual activity: Not on file   Other Topics Concern    Not on file   Social History Narrative    Not on file     Social Determinants of Health     Financial Resource Strain:     Difficulty of Paying Living Expenses: Not on file   Food Insecurity:     Worried About Running Out of Food in the Last Year: Not on file    Rodriguez of Food in the Last Year: Not on file   Transportation Needs:     Lack of Transportation (Medical): Not on file    Lack of Transportation (Non-Medical):  Not on file   Physical Activity: Inactive    Days of Exercise per Week: 0 days    Minutes of Exercise per Session: 0 min   Stress:     Feeling of Stress : Not on file   Social Connections:     Frequency of Communication with Friends and Family: Not on file    Frequency of Social Gatherings with Friends and Family: Not on file    Attends Baptism Services: Not on file    Active Member of Clubs or Organizations: Not on file    Attends Club or Organization Meetings: Not on file    Marital Status: Not on file   Intimate Partner Violence:     Fear of Current or Ex-Partner: Not on file    Emotionally Abused: Not on file    Physically Abused: Not on file    Sexually Abused: Not on file   Housing Stability:     Unable to Pay for Housing in the Last Year: Not on file    Number of Places Lived in the Last Year: Not on file    Unstable Housing in the Last Year: Not on file     Family History:     Family History   Problem Relation Age of Onset    Hypertension Mother     Heart Disease Mother     Cancer Mother       Allergies:   Latex and Kiwi extract     Review of Systems:   General: No fevers or chills. Eyes: No double vision or blurry vision. ENT: No sore throat or runny nose. Cardiovascular: No chest pain or palpitations. Lung: No shortness of breath or cough. Abdomen: No nausea, vomiting, diarrhea, or abdominal pain. Genitourinary: Sense of incomplete emptying and urinary frequency  Musculoskeletal: No muscle aches or pains. Hematologic: No bleeding or bruising. Neurologic: Weakness in bilateral lower extremities, left greater than right. Numbness and tingling in bilateral upper extremities, left greater than right    Physical Examination :   BP (!) 156/80   Pulse 94   Temp 98.1 °F (36.7 °C) (Oral)   Resp 18   Ht 5' 1\" (1.549 m) Comment: FROM CHART  Wt 142 lb (64.4 kg) Comment: FROM CHART  SpO2 92%   BMI 26.83 kg/m²     Temperature Range: Temp: 98.1 °F (36.7 °C) Temp  Av.4 °F (36.9 °C)  Min: 98.1 °F (36.7 °C)  Max: 98.9 °F (37.2 °C)  General Appearance: Awake, alert, and in no apparent distress  Head: Normocephalic, without obvious abnormality, atraumatic  Eyes: Pupils equal, round, reactive, to light and accommodation; extraocular movements intact; sclera anicteric; conjunctivae pink  ENT: Oropharynx clear, without erythema, exudate, or thrush. Neck: Supple, without lymphadenopathy.    Pulmonary/Chest: Clear to auscultation, without wheezes, rales, or rhonchi  Cardiovascular: Regular rate and rhythm without murmurs, rubs, or gallops. Abdomen: Soft, nontender, nondistended. Extremities: No cyanosis, clubbing, edema, or effusions. Neurologic: No gross sensory or motor deficits. Skin: Anterior neck incision with dressing in place. And Warm and dry with no rash. Medical Decision Making:   I have independently reviewed/ordered the following labs:  CBC with Differential:   Recent Labs     04/12/22  0612 04/13/22  0441   WBC 4.8 15.8*   HGB 9.8* 10.2*   HCT 30.8* 32.5*    392   LYMPHOPCT 23* 5*   MONOPCT 9 7     BMP:   Recent Labs     04/13/22  0441 04/14/22  0827   * 130*   K 4.0 3.4*   CL 89* 94*   CO2 24 24   BUN 7* 10   CREATININE 0.71 0.55     Hepatic Function Panel:   Recent Labs     04/11/22  2111 04/13/22  0441   PROT 7.6 7.1   LABALBU 4.3 3.9   BILITOT 0.33 0.37   ALKPHOS 94 78   ALT 13 13   AST 25 31       No results found for: PROCAL    No results found for: CRP  Lab Results   Component Value Date    FERRITIN 54 01/01/2022     No results found for: FIBRINOGEN  No results found for: DDIMER  No results found for: LDH    No results found for: SEDRATE    Lab Results   Component Value Date    COVID19 Not Detected 04/11/2022     No results found for requested labs within last 30 days. Imaging Studies:   XR CERVICAL SPINE (2-3 VIEWS)    Result Date: 4/13/2022  Expected postoperative findings status post anterior and posterior cervical fusion. XR CERVICAL SPINE FLEXION AND EXTENSION    Result Date: 4/13/2022  1. Retrolisthesis of C2 on C3 measures 2 mm on extension view and reduces on flexion view. 2. No evidence of instability of anterolisthesis of C3 on C4 measuring 1.5 mm. 3. No acute fracture on limited views. Of note, the C6-C7 and C7-T1 levels are not well seen due to overlying soft tissues. 4. Severe multilevel degenerative changes. CTA NECK W CONTRAST    Result Date: 4/12/2022  Unremarkable CTA of the neck.  Minor hazy pleural thickening and chronic-appearing medial right upper lobe atelectasis/scarring. If there is any clinical concern, follow-up noncontrast chest CT may be useful. RECOMMENDATIONS: Unavailable       Cultures:     Specimen Information: Urine, indwelling catheter         0 Result Notes    Component 4/12/22 1005   Specimen Description . CATHETERIZED URINE INITIAL OROURKE INSERTION    Culture STAPHYLOCOCCUS EPIDERMIDIS >[id number] CFU/ML Abnormal     Resulting Agency [facility]      Staphylococcus epidermidis (1)    Antibiotic Interpretation Microscan Method Status    penicillin Resistant >=0.5 BACTERIAL SUSCEPTIBILITY PANEL GET Final    gentamicin Sensitive <=0.5 BACTERIAL SUSCEPTIBILITY PANEL GET Final     Gentamicin is used only in combination with other active agents that test susceptible. levofloxacin Intermediate 4 BACTERIAL SUSCEPTIBILITY PANEL GET Final    nitrofurantoin Sensitive <=16 BACTERIAL SUSCEPTIBILITY PANEL GET Final    oxacillin Sensitive <=0.25 BACTERIAL SUSCEPTIBILITY PANEL GET Final    tetracycline Sensitive 2 BACTERIAL SUSCEPTIBILITY PANEL GET Final    trimethoprim-sulfamethoxazole Sensitive <=10 BACTERIAL SUSCEPTIBILITY PANEL GET Final          Specimen Collected: 04/12/22 10:05 Last Resulted: 04/14/22 07:08                 Thank you for allowing us to participate in the care of this patient. Please call with questions. Electronically signed by CASSANDRA Callahan CNP on 4/14/2022 at 9:24 AM      [facility]  [place], [address] messaging  OFFICE: [phone]      This note is created with the assistance of a speech recognition program.  While intending to generate a document that actually reflects the content of the visit, the document can still have some errors including those of syntax and sound a like substitutions which may escape proof reading. In such instances, actual meaning can be extrapolated by contextual diversion.

## 2022-04-14 NOTE — PROGRESS NOTES
[facility]  Office: [address], DO, Devon Barkley, DO, Tad Grande, DO, Fifi Fitzgeraldsamantha Blood, DO, Spencer Fuentes MD, Kori Ocasio MD, Gibson Reed MD, Manuel Morse MD, Zay Snow MD, Renu Randhawa MD, Fouzia Laboy MD, Juan Diego Johnson, DO, Bel Housre, DO, Danay Reyes MD,  Kvng Barbosa, DO, Magali Pfeiffer MD, Lissa Funez MD, Estevan Gerard MD, Carmen Fritz, DO, Nadine Ortega MD, Marie Heath MD, Janette Valderrama, CNP, [facility] Hospital[facility] Lopez, CNP, Noah Barthel, CNP, Juan Davis, CNP, Annie Puri, CNP, Kanchan Holland, CNP, Oleg Demarco, PAAmbrocioC, Augustus latonia, DNP, Pepe Zimmer, CNP, Argentina Sargent, CNP, Carrol Greco, CNP, Pritesh Márquez, CNS, Toshia Bautista, DNP, Mona Todd, CNP, Kori Pacheco, CNP, Vandana Echeverria, CNP         Bertha Londono [place] [address]    Progress Note    4/14/2022    10:24 AM    Name:   Dashawn Lima  MRN:     [id number]     Aishwarya [place]:      [de-identified]   Room:   [address] [place] [address] Day:  3  Admit Date:  4/11/2022  7:03 PM    PCP:   Verna Rinne, MD  Code Status:  Full Code    Subjective:     C/C: LE weakness     Interval History Status:     No issues overnight  States still having neck pain  Has been having intermittent dysuria  No long term mario  Culture growing staph epi  ID evaluation for antibiotic recommendations     Brief History:     The patient is [phone] y.o. female presented for elective surgery. Underwent anterior C5 corpectomy, posterior fixation from C2-T1 with arthrodesis, osteotomy, correction of deformity on 4/12 with Dr. Marla Coyle on 4/12. Symptoms include significant left-sided paresis, severe lower extremity weakness (wheelchair bound, now unable to stand or ambulate x0amplo), progressively worsening paresthesias and dexterity issues with left upper extremity and left lower extremity.  Additionally complains of some moderate saddle anesthesia, urge incontinence but no bowel dysfunction.  IM consulted post op to assist with medical management. Review of Systems:     Constitutional:  negative for chills, fevers, sweats  Respiratory:  negative for cough, dyspnea on exertion, shortness of breath, wheezing  Cardiovascular:  negative for chest pain, chest pressure/discomfort, lower extremity edema, palpitations  Gastrointestinal:  negative for abdominal pain, constipation, diarrhea, nausea, vomiting  Neurological:  negative for dizziness, headache    Medications: Allergies: Allergies   Allergen Reactions    Latex Hives, Itching, Dermatitis and Rash    Kiwi Extract      Face swelling, some difficulty breathing       Current Meds:   Scheduled Meds:    sulfamethoxazole-trimethoprim  1 tablet Oral 2 times per day    enoxaparin  40 mg SubCUTAneous Daily    Vitamin D  5,000 Units Oral Weekly    methadone  10 mg Oral BID    acetaminophen  650 mg Oral Q6H    cyclobenzaprine  10 mg Oral TID    polyethylene glycol  17 g Oral Daily    busPIRone  30 mg Oral BID    dilTIAZem  180 mg Oral Daily    docusate sodium  200 mg Oral BID    ferrous sulfate  325 mg Oral BID WC    levothyroxine  88 mcg Oral Daily    pantoprazole  40 mg Oral Daily     Continuous Infusions:    sodium chloride 75 mL/hr at 04/13/22 0914     PRN Meds: labetalol, morphine **OR** morphine, senna, magnesium hydroxide, tiZANidine, acetaminophen, ondansetron **OR** ondansetron, oxyCODONE **OR** oxyCODONE    Data:     Past Medical History:   has a past medical history of Anxiety, Arthritis, At maximum risk for fall, Cancer (HCC), Cauda equina syndrome (HCC), Cervical stenosis of spine, GERD (gastroesophageal reflux disease), History of stomach ulcers, Hypertension, Hypothyroidism, Lumbar neuritis, Post laminectomy syndrome, Spinal deformity, Thyroid disease, Uses wheelchair, Wears dentures, and Wellness examination. Social History:   reports that she quit smoking about 8 years ago. She has a 15.00 pack-year smoking history.  She has never used smokeless tobacco. She reports current alcohol use. She reports that she does not use drugs. Family History:   Family History   Problem Relation Age of Onset    Hypertension Mother     Heart Disease Mother     Cancer Mother        Vitals:  BP (!) 156/80   Pulse 104   Temp 98.1 °F (36.7 °C) (Oral)   Resp 18   Ht 5' 1\" (1.549 m) Comment: FROM CHART  Wt 142 lb (64.4 kg) Comment: FROM CHART  SpO2 92%   BMI 26.83 kg/m²   Temp (24hrs), Av.4 °F (36.9 °C), Min:98.1 °F (36.7 °C), Max:98.9 °F (37.2 °C)    No results for input(s): POCGLU in the last 72 hours. I/O (24Hr):     Intake/Output Summary (Last 24 hours) at 2022 1024  Last data filed at 2022 0925  Gross per 24 hour   Intake 260 ml   Output 4315 ml   Net -4055 ml       Labs:  Hematology:  Recent Labs     22   WBC 6.3 4.8 15.8*   RBC 3.79* 3.48* 3.67*   HGB 10.6* 9.8* 10.2*   HCT 32.9* 30.8* 32.5*   MCV 86.8 88.5 88.6   MCH 28.0 28.2 27.8   MCHC 32.2 31.8 31.4   RDW 16.3* 16.3* 17.0*    259 392   MPV 9.3 9.4 9.2   INR 1.0 1.1  --      Chemistry:  Recent Labs     22  0827    126* 130*   K 3.6* 4.0 3.4*    89* 94*   CO2 21 24 24   GLUCOSE 78 142* 101*   BUN 6* 7* 10   CREATININE 0.57 0.71 0.55   ANIONGAP 10 13 12   LABGLOM >60 >60 >60   GFRAA >60 >60 >60   CALCIUM 7.6* 8.6 8.6     Recent Labs     22   PROT 7.6 7.1   LABALBU 4.3 3.9   AST 25 31   ALT 13 13   ALKPHOS 94 78   BILITOT 0.33 0.37     ABG:No results found for: POCPH, PHART, PH, POCPCO2, JSJ8LOG, PCO2, POCPO2, PO2ART, PO2, POCHCO3, AIC0UCC, HCO3, NBEA, PBEA, BEART, BE, THGBART, THB, HSH8LDG, UCQY1CWT, U5LYOYNA, O2SAT, FIO2  Lab Results   Component Value Date/Time    SPECIAL NOT REPORTED 2022 10:20 AM     Lab Results   Component Value Date/Time    CULTURE STAPHYLOCOCCUS EPIDERMIDIS >080675 CFU/ML (A) 2022 10:05 AM       Radiology:  XR CERVICAL SPINE (2-3 VIEWS)    Result Date: 4/13/2022  Expected postoperative findings status post anterior and posterior cervical fusion. XR CHEST PORTABLE    Result Date: 4/13/2022  No acute cardiopulmonary disease. XR CERVICAL SPINE FLEXION AND EXTENSION    Result Date: 4/13/2022  1. Retrolisthesis of C2 on C3 measures 2 mm on extension view and reduces on flexion view. 2. No evidence of instability of anterolisthesis of C3 on C4 measuring 1.5 mm. 3. No acute fracture on limited views. Of note, the C6-C7 and C7-T1 levels are not well seen due to overlying soft tissues. 4. Severe multilevel degenerative changes. CTA NECK W CONTRAST    Result Date: 4/12/2022  Unremarkable CTA of the neck. Minor hazy pleural thickening and chronic-appearing medial right upper lobe atelectasis/scarring. If there is any clinical concern, follow-up noncontrast chest CT may be useful.  RECOMMENDATIONS: Unavailable       Physical Examination:        General appearance:  alert, cooperative and no distress  Mental Status:  oriented to person, place and time and normal affect  Lungs:  clear to auscultation bilaterally, normal effort  Heart:  regular rate and rhythm  Abdomen:  soft, nontender, nondistended, normal bowel sounds  Extremities:  no edema, redness, tenderness in the calves  Skin:  no gross lesions, rashes, induration    Assessment:        Hospital Problems           Last Modified POA    * (Principal) Hemiplegia ([address] 4/11/2022 Yes    Stenosis of cervical spine with myelopathy ([address] 4/12/2022 Yes    Essential hypertension 4/13/2022 Yes    Hypothyroidism 4/13/2022 Yes    Back pain 4/11/2022 Yes    Incomplete quadriplegia at C5-6 level ([address] 4/12/2022 Yes    [place] neck deformity of cervical spine 4/12/2022 Yes    Acute cystitis without hematuria 4/13/2022 Yes          Plan:        - Vitals, labs, imaging, medications reviewed  - Home mediations reviewed  - Continue home meds for hypothyroidism  - Follow up urine culture - staph epidermidis  - ID consult for further antibiotic recommendations / workup   - Monitor sodium - improved this AM  - Post op management per primary     Thank you for consult, call with questions     Shay Velazquez MD  4/14/2022  10:24 AM       Addendum     Rapid response called for hypotension  Per RN noted to be lethargic, BP in 60's  Patient responding appropriately  No tachycardia noted  Concern for medication induced hypotension   On flexeril, morphine, roxicodone, home methadone 10 BID - states she takes chronically   No home BP medications except Cardizem     - continue fluid bolus  - limit narcotics  - discussed with primary team (neurosurgery) and RN at bedside  - check Hgb   - BP responding to fluids, continue to monitor         Electronically signed by Shay Velazquez MD on 4/14/2022 at 11:58 AM

## 2022-04-14 NOTE — PROGRESS NOTES
Spoke with Brunilda Bailey CM, who states Dr Martha Reyes informed her that pt is approp for ARU. Writer requested if pt is medically ready. Pt was initiated on Lovenox. Per notes, rapid response was called d/t pt condition. ARU will follow.

## 2022-04-14 NOTE — PROGRESS NOTES
Neurosurgery AILEEN/Resident    Daily Progress Note     Cervical stenosis with myelopathy, cervical kyphotic deformity, incomplete quadriplegia; POD #2 C5 corpectomy, posterior fixation C2-C7    4/14/2022  8:04 AM    Chart reviewed. No acute events overnight. No new complaints. Resting in bed. Post-op pain controlled with current medication regimen. Denies nausea, not much of an appetite. Complaining of headaches. Improved strength. Voiding without difficulty.     Vitals:    04/13/22 1519 04/13/22 1933 04/14/22 0000 04/14/22 0745   BP: (!) 172/89 (!) 128/112 (!) 162/84 (!) 156/80   Pulse: 96  99 94   Resp: 17  15 18   Temp: 98.9 °F (37.2 °C) 98.4 °F (36.9 °C) 98.3 °F (36.8 °C) 98.1 °F (36.7 °C)   TempSrc: Oral Oral Oral Oral   SpO2: 95%  95% 92%   Weight:       Height:           PE:   AOx3  Motor   L deltoid 4/5; R deltoid 4+/5  L biceps 5/5; R biceps 5/5  L triceps 5/5; R triceps 5/5  L wrist extension 4+/5; R wrist extension 4+/5  4/5 bilateral hand grasp     L iliopsoas 3+/5 , R iliopsoas 5/5  L quadriceps 4+/5; R quadriceps 5/5  L Dorsiflexion 3/5; R dorsiflexion 5/5  L Plantarflexion 4+/5; R plantarflexion 5/5  L EHL 3/5; R EHL 5/5      Sensation: paresthesias BUE and BLE     Drain output: anterior TRAMAINE drain 0ml/12 hours  Posterior drain 175ml/12 hours- thin drainage   Incision: A/P clean dry intact       Lab Results   Component Value Date    WBC 15.8 (H) 04/13/2022    HGB 10.2 (L) 04/13/2022    HCT 32.5 (L) 04/13/2022     04/13/2022    CHOL 198 07/23/2020    TRIG 96 07/23/2020     (A) 07/23/2020    ALT 13 04/13/2022    AST 31 04/13/2022     (L) 04/13/2022    K 4.0 04/13/2022    CL 89 (L) 04/13/2022    CREATININE 0.71 04/13/2022    BUN 7 (L) 04/13/2022    CO2 24 04/13/2022    INR 1.1 04/12/2022    GLUF 109 07/23/2020           A/P  62 y.o. female who presents with cervical stenosis with myelopathy, cervical kyphotic deformity, incomplete quadriplegia; POD #2 C5 corpectomy, posterior fixation C2-C7      - HOB ok 30 degrees- no lower than that  - PT and OT  - PMR consulted for eval-recommending acute rehab on discharge  - General diet- aspiration precautions   - SCD for DVT prophylaxis  - Neuro checks per floor protocol  - Encourage ambulation   - Encourage incentive spirometry  - Medicine consulted   - Aspen collar on while out of bed ok to remove while resting in bed and eating   - Discontinue anterior and posterior drains today  - Start bactrim for UTI from mario insertion sample  - Hyponatremia yesterday-126, recheck sodium, serum and urine osmo    Please contact neurosurgery with any changes in patients neurologic status.        Charo Daniel CNP  4/14/22  8:04 AM

## 2022-04-14 NOTE — PROGRESS NOTES
Physical Therapy  Facility/Department: [address] STEP DOWN  Daily Treatment Note  NAME: Brandi Wang  : 1960  MRN: [id number]    Date of Service: 2022    Discharge Recommendations:    Further therapy recommended at discharge. The patient should be able to tolerate at least 3 hours of therapy per day over 5 days or 15 hours over 7 days. This patient may benefit from a Physical Medicine and Rehab consult. PT Equipment Recommendations  Equipment Needed: No    Assessment   Body structures, Functions, Activity limitations: Decreased functional mobility ; Decreased ADL status; Decreased strength;Decreased endurance;Decreased posture;Decreased balance  Assessment: Pt requires MOD A x2 for bed mobility and Min/Mod A to maintain seated EOB x 6 min. Fair/poor tolerance to treatment due to pain. Recommend contiuned PT after d/c to address deficits  Prognosis: Good  REQUIRES PT FOLLOW UP: Yes  Activity Tolerance  Activity Tolerance: Patient Tolerated treatment well;Patient limited by pain     Patient Diagnosis(es): There were no encounter diagnoses. has a past medical history of Anxiety, Arthritis, At maximum risk for fall, Cancer ([facility] [address] Cauda equina syndrome (HCC), Cervical stenosis of spine, GERD (gastroesophageal reflux disease), History of stomach ulcers, Hypertension, Hypothyroidism, Lumbar neuritis, Post laminectomy syndrome, Spinal deformity, Thyroid disease, Uses wheelchair, Wears dentures, and Wellness examination. has a past surgical history that includes hernia repair (Bilateral); Breast surgery (Right); Mastectomy, radical; Hysterectomy, total abdominal; Lumbar spine surgery (N/A, 2021); back surgery; Colonoscopy (about ); other surgical history (2022); cervical fusion (N/A, 2022); and cervical fusion (N/A, 2022).     Restrictions  Restrictions/Precautions  Restrictions/Precautions: Fall Risk,General Precautions  Required Braces or Orthoses?: Yes  Required Braces or Orthoses  Cervical: c-collar (ok to remove when eating, drinking, resting)  Position Activity Restriction  Other position/activity restrictions: ambulate pt; s/p C2-C7 fixation  Subjective   General  Response To Previous Treatment: Patient with no complaints from previous session.   Family / Caregiver Present: No  Subjective  Subjective: Pt resting in bed upon arrival, agreeable to PT, despite high pain  Pain Screening  Patient Currently in Pain: Yes  Pain Assessment  Pain Assessment: 0-10  Pain Level: 9  Pain Type: Acute pain;Surgical pain  Pain Location: Neck  Vital Signs  Patient Currently in Pain: Yes  Pre Treatment Pain Screening  Intervention List: Patient able to continue with treatment    Orientation  Orientation  Overall Orientation Status: Within Functional Limits  Cognition      Objective   Bed mobility  Rolling to Left: Moderate assistance  Supine to Sit: Moderate assistance;2 Person assistance  Sit to Supine: Maximum assistance;2 Person assistance  Scooting: Minimal assistance  Comment: C collar donned prior to mobility  Transfers  Sit to Stand: Unable to assess (RAMIREZ due to high pain and poor tolerance to sitting EOB)  Ambulation  Ambulation?: No     Balance  Posture: Fair  Sitting - Static: Fair;- (Pt sat EOB x ~6 MIN, limited by significant c/o pain, and requesting to return to supine)  Sitting - Dynamic: Fair;-    Exercise  B LE AROM/AAROM in all planes x 10   AM-PAC Score  AM-PAC Inpatient Mobility Raw Score : 8 (04/14/22 1231)  AM-PAC Inpatient T-Scale Score : 28.52 (04/14/22 1231)  Mobility Inpatient CMS 0-100% Score: 86.62 (04/14/22 1231)  Mobility Inpatient CMS G-Code Modifier : CM (04/14/22 1231)          Goals  Short term goals  Time Frame for Short term goals: 14 visits  Short term goal 1: Pt will perform bed mobility with SBA  Short term goal 2: Pt will perform transfers with Miryam  Short term goal 3: Pt will ambulate 50 ft, least restrictive AD, Miryam  Short term goal 4: Pt will perform wheelchair mobility for 150 ft, SBA  Short term goal 5: Pt will tolerate 30 mins physical therapy to promote in activity tolerance. Plan    Plan  Times per week: 5-6x/week  Current Treatment Recommendations: Strengthening,ROM,Balance Training,Endurance Training,Functional Mobility Training,Transfer Training,ADL/Self-care Training,Stair training,Gait [employer] Exercise Program,Safety Education & Training,Patient/Caregiver Education & Training,Equipment Evaluation, Education, & procurement  Safety Devices  Type of devices:  All fall risk precautions in place,Bed alarm in place,Call light within reach,Gait belt,Patient at risk for falls,Left in bed,Nurse notified  Restraints  Initially in place: No     Therapy Time   Individual Concurrent Group Co-treatment   Time In 0913         Time Out 0940         Minutes 27         Timed Code Treatment Minutes: [facility]

## 2022-04-14 NOTE — PROGRESS NOTES
[id number] [facility] Wound Ostomy Continence Nurse  Consult Note       NAME:  Sha Abebe RECORD NUMBER:  [id number]  AGE: 62 y.o. GENDER: female  : 1960  TODAY'S DATE:  2022    Subjective:     Reason for WOCN Evaluation and Assessment: \"treatment recs for face wound s/p surgery\"      Malik Martin is a 62 y.o. female referred by:   [] Physician  [x] Nursing ALLIE Giron  [] Other:     Wound Identification:          Bilateral cheek superficial skin injuries consistent with medical adhesive related skin injuries from the Nelson ET tube mendoza. Patient reports problems with certain tape in the past. IV sites and the throat dressing tape is not causing any difficulty at this time.      Objective:      /62   Pulse 87   Temp 98.3 °F (36.8 °C) (Oral)   Resp 20   Ht 5' 1\" (1.549 m) Comment: FROM CHART  Wt 142 lb (64.4 kg) Comment: FROM CHART  SpO2 95%   BMI 26.83 kg/m²   Morteza Risk Score: Morteza Scale Score: 13    LABS    CBC:   Lab Results   Component Value Date    WBC 15.8 2022    RBC 3.67 2022    HGB 8.6 2022     CMP:  Albumin:    Lab Results   Component Value Date    LABALBU 3.9 2022     PT/INR:    Lab Results   Component Value Date    PROTIME 11.6 2022    INR 1.1 2022     HgBA1c:  No results found for: LABA1C  PTT: No components found for: LABPTT      Assessment:       Measurements:       22 1445   Wound 22 Face Right   Date First Assessed/Time First Assessed: 22 144   Present on Hospital Admission: No  Location: Face  Wound Location Orientation: Right   Wound Etiology Skin Tear  (medical adhesive related skin injury)   Wound Assessment Superficial;Pink/red   Drainage Amount None   Odor None   Rosa-wound Assessment Intact   Wound 22 Face Left   Date First Assessed/Time First Assessed: 22   Present on Hospital Admission: No  Location: Face  Wound Location Orientation: Left   Wound Etiology Skin Tear  (medical adhesive related skin injury)   Wound Assessment Superficial;Pink/red   Drainage Amount None   Odor None   Rosa-wound Assessment Intact         Response to treatment:  Well tolerated by patient. Plan:     Plan of Care:   Message sent to the [facility] for neurosurgery regarding benefit of antibiotic ointment topically for moist wound healing. Would apply the antibiotic ointment in a thin layer to both cheek adhesive injuries BID until healed.      Specialty Bed Required : Yes   [] Low Air Loss   [x] Pressure Redistribution  [] Fluid Immersion  [] Bariatric  [] Total Pressure Relief  [] Other:     Discharge Plan:  TBD    Patient/Caregiver Teaching:    [] Indicates understanding       [] Needs reinforcement  [] Unsuccessful      [x] Verbal Understanding  [] Demonstrated understanding       [] No evidence of learning  [] Refused teaching         [] N/A       Electronically signed by Mardy Paget, RN, CWON on 4/14/2022 at 4:06 PM

## 2022-04-14 NOTE — FLOWSHEET NOTE
SPIRITUAL CARE DEPARTMENT - [address]  PROGRESS NOTE    Shift date: 4/14/22  Shift day: Thursday   Shift # 1    Room # 0029/1835-03   Name: Jada Vicente            Age: 62 y.o. Gender: female          Worship:    Place of Christianity:     Referral: Rapid Response    Admit Date & Time: 4/11/2022  7:03 PM    PATIENT/EVENT DESCRIPTION:  Jada Vicente is a 62 y.o. female         SPIRITUAL ASSESSMENT/INTERVENTION:   was a ministry of presence to medical staff and patient. Patient was asked for spiritual care visit which she accepted.  was a ministry of presence to patient and offered prayer for spiritual comfort/support which was accepted. Patient appeared to be comforted by visit and prayer. SPIRITUAL CARE FOLLOW-UP PLAN:  Chaplains will remain available to offer spiritual and emotional support as needed.       Electronically signed by Gaston Ybarra, on 4/14/2022 at 12:23 PM.  [facility]  [phone]       04/14/22 1222   Encounter Summary   Services provided to: Patient   Referral/Consult From: Multi-disciplinary team   Support System Children;Family members   Continue Visiting   (4/14/22)   Complexity of Encounter Moderate   Length of Encounter 30 minutes   Spiritual Assessment Completed Yes   Crisis   Type Rapid response   Assessment Approachable;Coping   Intervention Prayer;Sustaining presence/ Ministry of presence   Outcome Comfort

## 2022-04-14 NOTE — SIGNIFICANT EVENT
Rapid response called for hypotension  Per RN noted to be lethargic, BP in 60's  Patient responding appropriately  No tachycardia noted  Concern for medication induced hypotension   On flexeril, morphine, roxicodone, home methadone 10 BID - states she takes chronically   No home BP medications except Cardizem    - continue fluid bolus  - limit narcotics  - discussed with primary team (neurosurgery) and RN at bedside  - check Hgb   - BP responding to fluids, continue to monitor       Electronically signed by Salbador Crenshaw MD on 4/14/2022 at 11:58 AM

## 2022-04-14 NOTE — PLAN OF CARE
Drain Removal Note    Anterior cervical incision site TRAMAINE drain removed from suction, prepped with betadine. Drain suture cut and drain removed. Dry sterile occlusive dressing placed over drain hole with hemostasis. Posterior cervical incision site TRAMAINE drain removed from suction, prepped with betadine. Drain suture cut and drain removed. A simple suture placed with 3.0 nylon over drain hole with hemostasis. No complications, patient tolerated procedure well.     --  Giuliana Daily, CNP  8:56 AM EDT

## 2022-04-14 NOTE — PROGRESS NOTES
Occupational Therapy  Facility/Department: [address] STEP DOWN  Daily Treatment Note  NAME: Cyrus Rose  : 1960  MRN: [id number]    Date of Service: 2022    Discharge Recommendations:  Patient would benefit from continued therapy after discharge Further therapy recommended at discharge. The patient should be able to tolerate at least 3 hours of therapy per day over 5 days or 15 hours over 7 days. This patient may benefit from a Physical Medicine and Rehab consult. Assessment   Performance deficits / Impairments: Decreased functional mobility ; Decreased endurance;Decreased coordination;Decreased ADL status; Decreased sensation;Decreased balance;Decreased ROM; Decreased strength;Decreased high-level IADLs  Treatment Diagnosis: C5 Cordectomy, Posterior Fixation from C2-T1  Prognosis: Good  Patient Education: OT POC, transfer safety, bed mobility, importance of participation in therapy, don/doff c-collar, pursed lip breathing with poor/fair return. REQUIRES OT FOLLOW UP: Yes  Activity Tolerance  Activity Tolerance: Patient limited by pain  Activity Tolerance: Pt becoming anxious when seated EOB with c-collar donned. Pt c/o increase in pain in neck seated EOB. Safety Devices  Safety Devices in place: Yes  Type of devices: Call light within reach;Gait belt;Left in bed;Nurse notified         Patient Diagnosis(es): There were no encounter diagnoses. has a past medical history of Anxiety, Arthritis, At maximum risk for fall, Cancer ([address] Cauda equina syndrome (HCC), Cervical stenosis of spine, GERD (gastroesophageal reflux disease), History of stomach ulcers, Hypertension, Hypothyroidism, Lumbar neuritis, Post laminectomy syndrome, Spinal deformity, Thyroid disease, Uses wheelchair, Wears dentures, and Wellness examination. has a past surgical history that includes hernia repair (Bilateral); Breast surgery (Right);  Mastectomy, radical; Hysterectomy, total abdominal; Lumbar spine surgery (N/A, 12/30/2021); back surgery; Colonoscopy (about 2018); other surgical history (04/12/2022); cervical fusion (N/A, 4/12/2022); and cervical fusion (N/A, 4/12/2022). Restrictions  Restrictions/Precautions  Restrictions/Precautions: Fall Risk,General Precautions  Required Braces or Orthoses?: Yes  Required Braces or Orthoses  Cervical: c-collar (ok to remove when eating, drinking, resting)  Position Activity Restriction  Other position/activity restrictions: ambulate pt; s/p C2-C7 fixation  Subjective   General  Patient assessed for rehabilitation services?: Yes  Family / Caregiver Present: No  Diagnosis: C5 Cordectomy, Posterior Fixation from C2-T1  Pain Assessment  Pain Assessment: 0-10  Pain Level: 8  Pain Type: Acute pain;Surgical pain  Pain Location: Neck  Pain Orientation: Anterior;Posterior  Pain Descriptors: Aching;Discomfort;Sore;Heaviness  Pain Frequency: Continuous  Non-Pharmaceutical Pain Intervention(s): Distraction;Relaxation techniques;Repositioned;Rest;Therapeutic presence  Pre Treatment Pain Screening  Comments / Details: Pt c/o increased pain in neck area after transferring to seated EOB. Vital Signs  Patient Currently in Pain: Yes   Orientation  Orientation  Overall Orientation Status: Within Functional Limits  Objective    ADL  Feeding: Setup;Stand by assistance  Grooming: Setup;Contact guard assistance (Wash face seated in bed with HOB elevated.)  Additional Comments: Pt educated on doning c-collar seated in bed with fair return. Pt completed supine/sit transfer to seated EOB. Pt c/o increased pain in neck. Multiple verbal cues keep eyes open, correct posture (shoulders hunched forward), relax shoulder mucles and use pursed lip breathing technique with poor return. Pt becoming anxious w/mild dizziness needing to return to supine in bed and remove c-collar. Pt positioned seated in bed with HOB elevated and set up with breakfast tray on tray table in front of pt.  Pt declined further ADL care wanting to eat. Balance  Sitting Balance: Minimal assistance (Seated EOB x4 minutes. Pt B shoulders hunched forward.)  Standing Balance: Unable to assess(comment) (Pt unable to tolerate sitting EOB d/t increased pain in neck and discomfort of c-collar.)  Bed mobility  Supine to Sit: Moderate assistance;2 Person assistance  Sit to Supine: Maximum assistance;2 Person assistance  Scooting: Minimal assistance  Comment: C-collar donned prior to bed mobility. Transfers  Transfer Comments: RAMIREZ d/t poor tolerance of sitting EOB w/c-collar donned. Cognition  Overall Cognitive Status: [facility]   Plan   Plan  Times per week: 4-5x  Current Treatment Recommendations: Strengthening,ROM,Patient/Caregiver Education & Training,Equipment Evaluation, Education, & procurement,Balance Training,Functional Mobility Training,Endurance Training,Pain Management,Safety Education & Training,Self-Care / ADL  AM-PAC Score        AM-PAC Inpatient Daily Activity Raw Score: 14 (04/14/22 1229)  AM-PAC Inpatient ADL T-Scale Score : 33.39 (04/14/22 1229)  ADL Inpatient CMS 0-100% Score: 59.67 (04/14/22 1229)  ADL Inpatient CMS G-Code Modifier : CK (04/14/22 1229)    Goals  Short term goals  Time Frame for Short term goals: Pt will by discharge  Short term goal 1: Demo mod A for LB dressing/bathing tasks, with AE/adaptive strategies PRN and 1 VC. Short term goal 2: Demo CGA for UB dressing/bathing tasks, including c-collar PRN, with AE/adaptive strategies PRN and 1 VC. Short term goal 3: Demo min A for all bed mobility while adhering to cervical precautions for increased ADL independence and performance. Short term goal 4: Demo 15+ min dynamic sitting balance SUP during functional activity, with 1 or fewer rest breaks. Short term goal 5: Demo understanding and use of pursed-lip breathing technique during func activities/ADLs to address pain and fatigue with 0 VC. Short term goal 6: Collaborate with OTR to add func mob/transfer goal as appropriate. Therapy Time   Individual Concurrent Group Co-treatment   Time In 0915         Time Out 0939         Minutes 24         Timed Code Treatment Minutes: 15 Minutes     Co-tx PT. Pt supine in bed upon therapist arrival. Pleasant and agreeable to therapy with encouragement. See above for LOF for all tasks.  Pt retired to seated in bed with HOB elevated, call light within reach and breakfast tray set up on tray table in front of pt.    ABHILASH Nguyen/NARDA

## 2022-04-14 NOTE — CARE COORDINATION
Transitional planning:  Nurse rounds: Dr. Sylvie Andrews present, states pt is appropriate for ARU. ID was consulted, staph in urine. Started on antibiotics. Both drains were discontinued. [address] at [place] [address], notified pt is appropriate for ARU. If medically cleared, no precert needed, can accept today. PS Dr. Juan Chu team and notified SIDNEY Boyer MD, above. [facility], unit RN, pt has bed at [place] [address]. States pt is getting Morphine for pain, might be discharged tomorrow. 1118 Rapid response called for pt.   1200 Per unit RN, pt was hypotensive. Had only applesauce today with her medications.

## 2022-04-14 NOTE — PLAN OF CARE
Problem: Falls - Risk of:  Goal: Will remain free from falls  Description: Will remain free from falls  4/14/2022 0749 by Michelet Heredia RN  Outcome: Ongoing  4/14/2022 0446 by Malka Mccall RN  Outcome: Ongoing  Goal: Absence of physical injury  Description: Absence of physical injury  4/14/2022 0749 by Michelet Heredia RN  Outcome: Ongoing  4/14/2022 0446 by Malka Mccall RN  Outcome: Ongoing     Problem: Pain:  Goal: Pain level will decrease  Description: Pain level will decrease  4/14/2022 0749 by Michelet Heredia RN  Outcome: Ongoing  4/14/2022 0446 by Malka Mccall RN  Outcome: Ongoing  Goal: Control of acute pain  Description: Control of acute pain  4/14/2022 0749 by Michelet Heredia RN  Outcome: Ongoing  4/14/2022 0446 by Malka Mccall RN  Outcome: Ongoing  Goal: Control of chronic pain  Description: Control of chronic pain  4/14/2022 0749 by Michelet Heredia RN  Outcome: Ongoing  4/14/2022 0446 by Malka Mccall RN  Outcome: Ongoing     Problem: Skin Integrity:  Goal: Will show no infection signs and symptoms  Description: Will show no infection signs and symptoms  Outcome: Ongoing  Goal: Absence of new skin breakdown  Description: Absence of new skin breakdown  Outcome: Ongoing     Problem: Musculor/Skeletal Functional Status  Goal: Highest potential functional level  Outcome: Ongoing  Goal: Absence of falls  Outcome: Ongoing

## 2022-04-14 NOTE — SIGNIFICANT EVENT
Rapid response was called for altered mental status and hypotension. Evaluated patient at bedside. She appeared to be drowsy but was responsive and was following commands. BP- 65/45 mm Hg HR of 70- 80 per minute. Saturating well in room air. Fluid bolus was started and he blood pressure went up to 85/65. She received dialtizem this am and received morphine, Roxicodone couple hours back. Plan- Drug induced hypotension.  - continue challenge with bolus fluid, limit narcotics and hold antihypertensive medication for now. Monitor on floor  -Check H and h.  - Plan was discussed with neurosurgery(primary) and internal medicine(consult). Agreed with plan. - Keep monitoring vital if non responsive will evaluate for further need to transfer to ICU. Thank you for involving us in patient care. Please contact us if need to reevaluate her again.     Electronically signed by Jesse Gracia MD on 4/14/2022 at 12:53 PM

## 2022-04-15 ENCOUNTER — HOSPITAL ENCOUNTER (INPATIENT)
Age: 62
LOS: 14 days | Discharge: HOME | DRG: 560 | End: 2022-04-29
Attending: PHYSICAL MEDICINE & REHABILITATION | Admitting: PHYSICAL MEDICINE & REHABILITATION
Payer: MEDICARE

## 2022-04-15 VITALS
HEIGHT: 61 IN | DIASTOLIC BLOOD PRESSURE: 83 MMHG | HEART RATE: 113 BPM | OXYGEN SATURATION: 95 % | TEMPERATURE: 98.3 F | BODY MASS INDEX: 26.81 KG/M2 | SYSTOLIC BLOOD PRESSURE: 174 MMHG | RESPIRATION RATE: 15 BRPM | WEIGHT: 142 LBS

## 2022-04-15 DIAGNOSIS — G95.9 CERVICAL MYELOPATHY (HCC): ICD-10-CM

## 2022-04-15 DIAGNOSIS — G82.54 INCOMPLETE QUADRIPLEGIA AT C5-6 LEVEL (HCC): ICD-10-CM

## 2022-04-15 DIAGNOSIS — S14.109S SPINAL CORD INJURY, CERVICAL REGION, SEQUELA (HCC): Primary | ICD-10-CM

## 2022-04-15 DIAGNOSIS — G83.4 CAUDA EQUINA SYNDROME (HCC): ICD-10-CM

## 2022-04-15 LAB
ABSOLUTE EOS #: 0.03 K/UL (ref 0–0.44)
ABSOLUTE IMMATURE GRANULOCYTE: 0.1 K/UL (ref 0–0.3)
ABSOLUTE LYMPH #: 1.2 K/UL (ref 1.1–3.7)
ABSOLUTE MONO #: 0.63 K/UL (ref 0.1–1.2)
ANION GAP SERPL CALCULATED.3IONS-SCNC: 10 MMOL/L (ref 9–17)
BASOPHILS # BLD: 1 % (ref 0–2)
BASOPHILS ABSOLUTE: 0.04 K/UL (ref 0–0.2)
BUN BLDV-MCNC: 9 MG/DL (ref 8–23)
CALCIUM SERPL-MCNC: 8.1 MG/DL (ref 8.6–10.4)
CHLORIDE BLD-SCNC: 100 MMOL/L (ref 98–107)
CO2: 22 MMOL/L (ref 20–31)
CREAT SERPL-MCNC: 0.54 MG/DL (ref 0.5–0.9)
EOSINOPHILS RELATIVE PERCENT: 0 % (ref 1–4)
GFR AFRICAN AMERICAN: >60 ML/MIN
GFR NON-AFRICAN AMERICAN: >60 ML/MIN
GFR SERPL CREATININE-BSD FRML MDRD: ABNORMAL ML/MIN/{1.73_M2}
GLUCOSE BLD-MCNC: 93 MG/DL (ref 70–99)
HCT VFR BLD CALC: 29.1 % (ref 36.3–47.1)
HEMOGLOBIN: 9 G/DL (ref 11.9–15.1)
IMMATURE GRANULOCYTES: 1 %
LYMPHOCYTES # BLD: 17 % (ref 24–43)
MCH RBC QN AUTO: 28.2 PG (ref 25.2–33.5)
MCHC RBC AUTO-ENTMCNC: 30.9 G/DL (ref 28.4–34.8)
MCV RBC AUTO: 91.2 FL (ref 82.6–102.9)
MONOCYTES # BLD: 9 % (ref 3–12)
NRBC AUTOMATED: 0 PER 100 WBC
PDW BLD-RTO: 17.2 % (ref 11.8–14.4)
PLATELET # BLD: 213 K/UL (ref 138–453)
PMV BLD AUTO: 9.3 FL (ref 8.1–13.5)
POTASSIUM SERPL-SCNC: 3.2 MMOL/L (ref 3.7–5.3)
RBC # BLD: 3.19 M/UL (ref 3.95–5.11)
RBC # BLD: ABNORMAL 10*6/UL
SEG NEUTROPHILS: 72 % (ref 36–65)
SEGMENTED NEUTROPHILS ABSOLUTE COUNT: 5.27 K/UL (ref 1.5–8.1)
SODIUM BLD-SCNC: 132 MMOL/L (ref 135–144)
WBC # BLD: 7.3 K/UL (ref 3.5–11.3)

## 2022-04-15 PROCEDURE — 6370000000 HC RX 637 (ALT 250 FOR IP): Performed by: NURSE PRACTITIONER

## 2022-04-15 PROCEDURE — 99232 SBSQ HOSP IP/OBS MODERATE 35: CPT | Performed by: INTERNAL MEDICINE

## 2022-04-15 PROCEDURE — APPSS45 APP SPLIT SHARED TIME 31-45 MINUTES: Performed by: REGISTERED NURSE

## 2022-04-15 PROCEDURE — APPSS30 APP SPLIT SHARED TIME 16-30 MINUTES: Performed by: NURSE PRACTITIONER

## 2022-04-15 PROCEDURE — 1180000000 HC REHAB R&B

## 2022-04-15 PROCEDURE — 6370000000 HC RX 637 (ALT 250 FOR IP): Performed by: INTERNAL MEDICINE

## 2022-04-15 PROCEDURE — 85025 COMPLETE CBC W/AUTO DIFF WBC: CPT

## 2022-04-15 PROCEDURE — 97116 GAIT TRAINING THERAPY: CPT

## 2022-04-15 PROCEDURE — 6370000000 HC RX 637 (ALT 250 FOR IP): Performed by: REGISTERED NURSE

## 2022-04-15 PROCEDURE — 80048 BASIC METABOLIC PNL TOTAL CA: CPT

## 2022-04-15 PROCEDURE — 97112 NEUROMUSCULAR REEDUCATION: CPT

## 2022-04-15 PROCEDURE — 97530 THERAPEUTIC ACTIVITIES: CPT

## 2022-04-15 PROCEDURE — 97110 THERAPEUTIC EXERCISES: CPT

## 2022-04-15 PROCEDURE — 6370000000 HC RX 637 (ALT 250 FOR IP): Performed by: PHYSICAL MEDICINE & REHABILITATION

## 2022-04-15 PROCEDURE — 6360000002 HC RX W HCPCS: Performed by: NURSE PRACTITIONER

## 2022-04-15 PROCEDURE — 36415 COLL VENOUS BLD VENIPUNCTURE: CPT

## 2022-04-15 RX ORDER — METHADONE HYDROCHLORIDE 10 MG/1
10 TABLET ORAL 2 TIMES DAILY
Status: DISCONTINUED | OUTPATIENT
Start: 2022-04-15 | End: 2022-04-29 | Stop reason: HOSPADM

## 2022-04-15 RX ORDER — PANTOPRAZOLE SODIUM 40 MG/1
40 TABLET, DELAYED RELEASE ORAL DAILY
Status: DISCONTINUED | OUTPATIENT
Start: 2022-04-16 | End: 2022-04-29 | Stop reason: HOSPADM

## 2022-04-15 RX ORDER — OXYCODONE HYDROCHLORIDE 5 MG/1
5 TABLET ORAL EVERY 4 HOURS PRN
Status: CANCELLED | OUTPATIENT
Start: 2022-04-15

## 2022-04-15 RX ORDER — VITAMIN B COMPLEX
5000 TABLET ORAL WEEKLY
Status: CANCELLED | OUTPATIENT
Start: 2022-04-20

## 2022-04-15 RX ORDER — FERROUS SULFATE 325(65) MG
325 TABLET ORAL 2 TIMES DAILY WITH MEALS
Status: DISCONTINUED | OUTPATIENT
Start: 2022-04-16 | End: 2022-04-29 | Stop reason: HOSPADM

## 2022-04-15 RX ORDER — OXYCODONE HYDROCHLORIDE 5 MG/1
5 TABLET ORAL EVERY 4 HOURS PRN
Status: DISCONTINUED | OUTPATIENT
Start: 2022-04-15 | End: 2022-04-29 | Stop reason: HOSPADM

## 2022-04-15 RX ORDER — LABETALOL 20 MG/4 ML (5 MG/ML) INTRAVENOUS SYRINGE
10 EVERY 4 HOURS PRN
Status: DISCONTINUED | OUTPATIENT
Start: 2022-04-15 | End: 2022-04-16

## 2022-04-15 RX ORDER — LEVOTHYROXINE SODIUM 88 UG/1
88 TABLET ORAL DAILY
Status: CANCELLED | OUTPATIENT
Start: 2022-04-16

## 2022-04-15 RX ORDER — POTASSIUM CHLORIDE 20 MEQ/1
40 TABLET, EXTENDED RELEASE ORAL PRN
Status: DISCONTINUED | OUTPATIENT
Start: 2022-04-15 | End: 2022-04-15 | Stop reason: HOSPADM

## 2022-04-15 RX ORDER — LANOLIN ALCOHOL/MO/W.PET/CERES
325 CREAM (GRAM) TOPICAL 2 TIMES DAILY WITH MEALS
Status: CANCELLED | OUTPATIENT
Start: 2022-04-15

## 2022-04-15 RX ORDER — BUSPIRONE HYDROCHLORIDE 15 MG/1
30 TABLET ORAL 2 TIMES DAILY
Status: DISCONTINUED | OUTPATIENT
Start: 2022-04-15 | End: 2022-04-29 | Stop reason: HOSPADM

## 2022-04-15 RX ORDER — VITAMIN B COMPLEX
5000 TABLET ORAL WEEKLY
Status: DISCONTINUED | OUTPATIENT
Start: 2022-04-20 | End: 2022-04-18

## 2022-04-15 RX ORDER — BACITRACIN, NEOMYCIN, POLYMYXIN B 400; 3.5; 5 [USP'U]/G; MG/G; [USP'U]/G
OINTMENT TOPICAL 2 TIMES DAILY
Status: CANCELLED | OUTPATIENT
Start: 2022-04-15

## 2022-04-15 RX ORDER — METHOCARBAMOL 750 MG/1
750 TABLET, FILM COATED ORAL 4 TIMES DAILY PRN
Status: DISCONTINUED | OUTPATIENT
Start: 2022-04-15 | End: 2022-04-17

## 2022-04-15 RX ORDER — POLYETHYLENE GLYCOL 3350 17 G/17G
17 POWDER, FOR SOLUTION ORAL DAILY
Status: DISCONTINUED | OUTPATIENT
Start: 2022-04-16 | End: 2022-04-29 | Stop reason: HOSPADM

## 2022-04-15 RX ORDER — PANTOPRAZOLE SODIUM 40 MG/1
40 TABLET, DELAYED RELEASE ORAL DAILY
Status: CANCELLED | OUTPATIENT
Start: 2022-04-15

## 2022-04-15 RX ORDER — ACETAMINOPHEN 325 MG/1
650 TABLET ORAL EVERY 4 HOURS PRN
Status: DISCONTINUED | OUTPATIENT
Start: 2022-04-15 | End: 2022-04-16 | Stop reason: SDUPTHER

## 2022-04-15 RX ORDER — BACITRACIN, NEOMYCIN, POLYMYXIN B 400; 3.5; 5 [USP'U]/G; MG/G; [USP'U]/G
OINTMENT TOPICAL 2 TIMES DAILY
Status: DISCONTINUED | OUTPATIENT
Start: 2022-04-15 | End: 2022-04-29 | Stop reason: HOSPADM

## 2022-04-15 RX ORDER — BUSPIRONE HYDROCHLORIDE 15 MG/1
30 TABLET ORAL 2 TIMES DAILY
Status: CANCELLED | OUTPATIENT
Start: 2022-04-15

## 2022-04-15 RX ORDER — ONDANSETRON 4 MG/1
4 TABLET, ORALLY DISINTEGRATING ORAL EVERY 8 HOURS PRN
Status: DISCONTINUED | OUTPATIENT
Start: 2022-04-15 | End: 2022-04-29 | Stop reason: HOSPADM

## 2022-04-15 RX ORDER — POLYETHYLENE GLYCOL 3350 17 G/17G
17 POWDER, FOR SOLUTION ORAL DAILY
Status: CANCELLED | OUTPATIENT
Start: 2022-04-15

## 2022-04-15 RX ORDER — OXYCODONE HYDROCHLORIDE 10 MG/1
10 TABLET ORAL EVERY 4 HOURS PRN
Status: DISCONTINUED | OUTPATIENT
Start: 2022-04-15 | End: 2022-04-29 | Stop reason: HOSPADM

## 2022-04-15 RX ORDER — BISACODYL 10 MG
10 SUPPOSITORY, RECTAL RECTAL DAILY PRN
Status: DISCONTINUED | OUTPATIENT
Start: 2022-04-15 | End: 2022-04-25

## 2022-04-15 RX ORDER — SENNA PLUS 8.6 MG/1
2 TABLET ORAL DAILY PRN
Status: CANCELLED | OUTPATIENT
Start: 2022-04-15

## 2022-04-15 RX ORDER — SENNA PLUS 8.6 MG/1
1 TABLET ORAL DAILY PRN
Status: CANCELLED | OUTPATIENT
Start: 2022-04-15

## 2022-04-15 RX ORDER — POLYETHYLENE GLYCOL 3350 17 G/17G
17 POWDER, FOR SOLUTION ORAL DAILY
Status: DISCONTINUED | OUTPATIENT
Start: 2022-04-15 | End: 2022-04-15 | Stop reason: SDUPTHER

## 2022-04-15 RX ORDER — ONDANSETRON 2 MG/ML
4 INJECTION INTRAMUSCULAR; INTRAVENOUS EVERY 6 HOURS PRN
Status: DISCONTINUED | OUTPATIENT
Start: 2022-04-15 | End: 2022-04-16

## 2022-04-15 RX ORDER — DOCUSATE SODIUM 100 MG/1
200 CAPSULE, LIQUID FILLED ORAL 2 TIMES DAILY
Status: CANCELLED | OUTPATIENT
Start: 2022-04-15

## 2022-04-15 RX ORDER — SENNA PLUS 8.6 MG/1
2 TABLET ORAL DAILY PRN
Status: DISCONTINUED | OUTPATIENT
Start: 2022-04-15 | End: 2022-04-29 | Stop reason: HOSPADM

## 2022-04-15 RX ORDER — OXYCODONE HYDROCHLORIDE 5 MG/1
10 TABLET ORAL EVERY 4 HOURS PRN
Status: CANCELLED | OUTPATIENT
Start: 2022-04-15

## 2022-04-15 RX ORDER — ACETAMINOPHEN 325 MG/1
650 TABLET ORAL EVERY 6 HOURS
Status: DISCONTINUED | OUTPATIENT
Start: 2022-04-15 | End: 2022-04-29 | Stop reason: HOSPADM

## 2022-04-15 RX ORDER — ACETAMINOPHEN 325 MG/1
650 TABLET ORAL EVERY 4 HOURS PRN
Status: CANCELLED | OUTPATIENT
Start: 2022-04-15

## 2022-04-15 RX ORDER — SENNA PLUS 8.6 MG/1
1 TABLET ORAL DAILY PRN
Status: DISCONTINUED | OUTPATIENT
Start: 2022-04-15 | End: 2022-04-15 | Stop reason: SDUPTHER

## 2022-04-15 RX ORDER — LABETALOL HYDROCHLORIDE 5 MG/ML
10 INJECTION, SOLUTION INTRAVENOUS EVERY 4 HOURS PRN
Status: CANCELLED | OUTPATIENT
Start: 2022-04-15

## 2022-04-15 RX ORDER — DILTIAZEM HYDROCHLORIDE 180 MG/1
180 CAPSULE, COATED, EXTENDED RELEASE ORAL DAILY
Status: DISCONTINUED | OUTPATIENT
Start: 2022-04-16 | End: 2022-04-29 | Stop reason: HOSPADM

## 2022-04-15 RX ORDER — DILTIAZEM HYDROCHLORIDE 180 MG/1
180 CAPSULE, COATED, EXTENDED RELEASE ORAL DAILY
Status: CANCELLED | OUTPATIENT
Start: 2022-04-15

## 2022-04-15 RX ORDER — METHADONE HYDROCHLORIDE 10 MG/1
10 TABLET ORAL 2 TIMES DAILY
Status: CANCELLED | OUTPATIENT
Start: 2022-04-15

## 2022-04-15 RX ORDER — POTASSIUM CHLORIDE 7.45 MG/ML
10 INJECTION INTRAVENOUS PRN
Status: DISCONTINUED | OUTPATIENT
Start: 2022-04-15 | End: 2022-04-15 | Stop reason: HOSPADM

## 2022-04-15 RX ORDER — ACETAMINOPHEN 325 MG/1
650 TABLET ORAL EVERY 6 HOURS
Status: CANCELLED | OUTPATIENT
Start: 2022-04-15

## 2022-04-15 RX ORDER — ONDANSETRON 4 MG/1
4 TABLET, ORALLY DISINTEGRATING ORAL EVERY 8 HOURS PRN
Status: CANCELLED | OUTPATIENT
Start: 2022-04-15

## 2022-04-15 RX ORDER — DOCUSATE SODIUM 100 MG/1
200 CAPSULE, LIQUID FILLED ORAL 2 TIMES DAILY
Status: DISCONTINUED | OUTPATIENT
Start: 2022-04-15 | End: 2022-04-29 | Stop reason: HOSPADM

## 2022-04-15 RX ORDER — LEVOTHYROXINE SODIUM 88 UG/1
88 TABLET ORAL DAILY
Status: DISCONTINUED | OUTPATIENT
Start: 2022-04-16 | End: 2022-04-24

## 2022-04-15 RX ORDER — BISACODYL 10 MG
10 SUPPOSITORY, RECTAL RECTAL DAILY PRN
Status: CANCELLED | OUTPATIENT
Start: 2022-04-15

## 2022-04-15 RX ORDER — METHOCARBAMOL 750 MG/1
750 TABLET, FILM COATED ORAL 4 TIMES DAILY PRN
Status: CANCELLED | OUTPATIENT
Start: 2022-04-15

## 2022-04-15 RX ORDER — ONDANSETRON 2 MG/ML
4 INJECTION INTRAMUSCULAR; INTRAVENOUS EVERY 6 HOURS PRN
Status: CANCELLED | OUTPATIENT
Start: 2022-04-15

## 2022-04-15 RX ADMIN — OXYCODONE HYDROCHLORIDE 10 MG: 10 TABLET ORAL at 18:00

## 2022-04-15 RX ADMIN — POLYMYXIN B SULFATE, BACITRACIN ZINC, NEOMYCIN SULFATE: 5000; 3.5; 4 OINTMENT TOPICAL at 08:05

## 2022-04-15 RX ADMIN — OXYCODONE HYDROCHLORIDE 5 MG: 5 TABLET ORAL at 21:45

## 2022-04-15 RX ADMIN — POLYETHYLENE GLYCOL 3350 17 G: 17 POWDER, FOR SOLUTION ORAL at 08:09

## 2022-04-15 RX ADMIN — PANTOPRAZOLE SODIUM 40 MG: 40 TABLET, DELAYED RELEASE ORAL at 08:08

## 2022-04-15 RX ADMIN — POLYMYXIN B SULFATE, BACITRACIN ZINC, NEOMYCIN SULFATE: 5000; 3.5; 4 OINTMENT TOPICAL at 21:49

## 2022-04-15 RX ADMIN — DOCUSATE SODIUM 200 MG: 100 CAPSULE ORAL at 08:08

## 2022-04-15 RX ADMIN — OXYCODONE 10 MG: 5 TABLET ORAL at 11:34

## 2022-04-15 RX ADMIN — OXYCODONE 10 MG: 5 TABLET ORAL at 04:33

## 2022-04-15 RX ADMIN — ACETAMINOPHEN 650 MG: 325 TABLET ORAL at 04:33

## 2022-04-15 RX ADMIN — METHADONE HYDROCHLORIDE 10 MG: 10 TABLET ORAL at 21:46

## 2022-04-15 RX ADMIN — METHADONE HYDROCHLORIDE 10 MG: 10 TABLET ORAL at 08:07

## 2022-04-15 RX ADMIN — BUSPIRONE HYDROCHLORIDE 30 MG: 15 TABLET ORAL at 08:08

## 2022-04-15 RX ADMIN — FERROUS SULFATE TAB EC 325 MG (65 MG FE EQUIVALENT) 325 MG: 325 (65 FE) TABLET DELAYED RESPONSE at 08:07

## 2022-04-15 RX ADMIN — ACETAMINOPHEN 650 MG: 325 TABLET ORAL at 08:06

## 2022-04-15 RX ADMIN — DILTIAZEM HYDROCHLORIDE 180 MG: 180 CAPSULE, COATED, EXTENDED RELEASE ORAL at 08:08

## 2022-04-15 RX ADMIN — ACETAMINOPHEN 650 MG: 325 TABLET ORAL at 21:46

## 2022-04-15 RX ADMIN — SULFAMETHOXAZOLE AND TRIMETHOPRIM 1 TABLET: 800; 160 TABLET ORAL at 08:07

## 2022-04-15 RX ADMIN — ENOXAPARIN SODIUM 40 MG: 40 INJECTION SUBCUTANEOUS at 08:06

## 2022-04-15 RX ADMIN — DOCUSATE SODIUM 200 MG: 100 CAPSULE, LIQUID FILLED ORAL at 21:47

## 2022-04-15 RX ADMIN — POTASSIUM CHLORIDE 40 MEQ: 1500 TABLET, EXTENDED RELEASE ORAL at 11:34

## 2022-04-15 RX ADMIN — OXYCODONE 10 MG: 5 TABLET ORAL at 00:08

## 2022-04-15 RX ADMIN — BUSPIRONE HYDROCHLORIDE 30 MG: 15 TABLET ORAL at 21:49

## 2022-04-15 RX ADMIN — LEVOTHYROXINE SODIUM 88 MCG: 88 TABLET ORAL at 08:08

## 2022-04-15 RX ADMIN — METHOCARBAMOL 750 MG: 750 TABLET ORAL at 21:46

## 2022-04-15 ASSESSMENT — PAIN DESCRIPTION - PAIN TYPE
TYPE: SURGICAL PAIN
TYPE: ACUTE PAIN;SURGICAL PAIN
TYPE: SURGICAL PAIN
TYPE: SURGICAL PAIN

## 2022-04-15 ASSESSMENT — PAIN DESCRIPTION - ONSET
ONSET: ON-GOING

## 2022-04-15 ASSESSMENT — PAIN DESCRIPTION - FREQUENCY
FREQUENCY: CONTINUOUS

## 2022-04-15 ASSESSMENT — PAIN DESCRIPTION - DESCRIPTORS
DESCRIPTORS: ACHING
DESCRIPTORS: ACHING;DISCOMFORT

## 2022-04-15 ASSESSMENT — PAIN SCALES - GENERAL
PAINLEVEL_OUTOF10: 8
PAINLEVEL_OUTOF10: 8
PAINLEVEL_OUTOF10: 5
PAINLEVEL_OUTOF10: 8
PAINLEVEL_OUTOF10: 9
PAINLEVEL_OUTOF10: 4
PAINLEVEL_OUTOF10: 8
PAINLEVEL_OUTOF10: 10
PAINLEVEL_OUTOF10: 8
PAINLEVEL_OUTOF10: 8

## 2022-04-15 ASSESSMENT — ENCOUNTER SYMPTOMS
RESPIRATORY NEGATIVE: 1
GASTROINTESTINAL NEGATIVE: 1

## 2022-04-15 ASSESSMENT — PAIN DESCRIPTION - ORIENTATION: ORIENTATION: POSTERIOR

## 2022-04-15 ASSESSMENT — PAIN DESCRIPTION - LOCATION
LOCATION: NECK

## 2022-04-15 NOTE — DISCHARGE INSTR - COC
Problems:  Patient Active Problem List   Diagnosis Code    Stenosis of cervical spine with myelopathy (HCC) M48.02, G99.2    Essential hypertension I10    Hypothyroidism E03.9    Lumbar radiculopathy, chronic M54.16    Lumbar neuritis M54.16    Post laminectomy syndrome M96.1    Hypokalemia E87.6    Pain of right hip joint M25.551    Post-menopausal osteoporosis M81.0    Chronic gastritis without bleeding K29.50    Elevated CEA R97.0    Esophageal dysphagia R13.19    Hypotension due to drugs I95.2    Ulcerative esophagitis K22.10    Weight loss, abnormal R63.4    Cervical myelopathy ([facility]) G95.9    Lumbar stenosis with neurogenic claudication M48.062    Spinal deformity Q76.49    Anxiety about health F41.8    Cauda equina compression ([facility]) G83.4    Anemia, normocytic normochromic D64.9    Iron deficiency E61.1    Pseudomonas urinary tract infection N39.0, B96.5    Hypocalcemia E83.51    Cauda equina syndrome ([facility]) G83.4    Hemiplegia ([facility]) G81.90    Back pain M54.9    Incomplete quadriplegia at C5-6 level ([facility]) G82.54    Weir neck deformity of cervical spine M43.8X2    Acute cystitis without hematuria N30.00       Isolation/Infection:   Isolation            No Isolation          Patient Infection Status       Infection Onset Added Last Indicated Last Indicated By Review Planned Expiration Resolved Resolved By    None active    Resolved    COVID-19 (Rule Out) 04/11/22 04/11/22 04/11/22 COVID-19, Rapid (Ordered)   04/11/22 Rule-Out Test Resulted            Nurse Assessment:  Last Vital Signs: BP (!) 176/83   Pulse 98   Temp 98 °F (36.7 °C) (Oral)   Resp 15   Ht 5' 1\" (1.549 m) Comment: FROM CHART  Wt 142 lb (64.4 kg) Comment: FROM CHART  SpO2 96%   BMI 26.83 kg/m²     Last documented pain score (0-10 scale): Pain Level: 4  Last Weight:   Wt Readings from Last 1 Encounters:   04/12/22 142 lb (64.4 kg)     Mental Status:  {IP PT MENTAL STATUS:20030}    IV Access:  {[facility] IV ACCESS:511154406}    Nursing Mobility/ADLs:  Walking   {CHP DME VGDB:402795329}  Transfer  {CHP DME IXVA:340277098}  Bathing  {CHP DME GCLM:903476091}  Dressing  {CHP DME NDEI:241967615}  Toileting  {CHP DME GGLD:863492463}  Feeding  {CHP DME PNIO:873811886}  Med Admin  {CHP DME MZPU:002769616}  Med Delivery   { MONICA MED Delivery:005200453}    Wound Care Documentation and Therapy:  Wound 04/14/22 Face Right (Active)   Wound Etiology Skin Tear 04/15/22 1143   Dressing Status Other (Comment) 04/15/22 1143   Wound Assessment Superficial;Grandy/red 04/15/22 1143   Drainage Amount None 04/15/22 1143   Odor None 04/15/22 1143   Rosa-wound Assessment Intact 04/15/22 1143   Number of days: 1       Wound 04/14/22 Face Left (Active)   Wound Etiology Skin Tear 04/15/22 1143   Wound Assessment Superficial;Grandy/red 04/15/22 1143   Drainage Amount None 04/15/22 1143   Odor None 04/15/22 1143   Rosa-wound Assessment Intact 04/15/22 1143   Number of days: 1        Elimination:  Continence: Bowel: {YES / YP:31743}  Bladder: {YES / GQ:49141}  Urinary Catheter: {Urinary Catheter:449050293}   Colostomy/Ileostomy/Ileal Conduit: {YES / CQ:78054}       Date of Last BM: ***    Intake/Output Summary (Last 24 hours) at 4/15/2022 1616  Last data filed at 4/15/2022 1318  Gross per 24 hour   Intake 3996.01 ml   Output 2400 ml   Net 1596.01 ml     I/O last 3 completed shifts: In: 260 [P.O.:250;  I.V.:10]  Out: 4575 [Urine:4400; Drains:175]    Safety Concerns:     [address] Safety Concerns:047057965}    Impairments/Disabilities:      [address] Impairments/Disabilities:640351330}    Nutrition Therapy:  Current Nutrition Therapy:   [address] Diet List:857922523}    Routes of Feeding: {CHP DME Other Feedings:731307381}  Liquids: {Slp liquid thickness:65547}  Daily Fluid Restriction: {CHP DME Yes amt example:853483903}  Last Modified Barium Swallow with Video (Video Swallowing Test): {Done Not Done [facility]:048769533}    Treatments at the Time of Hospital Discharge:   Respiratory Treatments: ***  Oxygen Therapy:  {Therapy; copd oxygen:86453}  Ventilator:    {MH CC Vent FZT}    Rehab Therapies: {THERAPEUTIC INTERVENTION:4092162551}  Weight Bearing Status/Restrictions: 50Julius PLASENCIA Weight Bearin}  Other Medical Equipment (for information only, NOT a DME order):  {EQUIPMENT:182490362}  Other Treatments: ***    Patient's personal belongings (please select all that are sent with patient):  {CHP DME Belongings:292466652}    RN SIGNATURE:  {Esignature:849606719}    CASE MANAGEMENT/SOCIAL WORK SECTION    Inpatient Status Date: ***    Readmission Risk Assessment Score:  Readmission Risk              Risk of Unplanned Readmission:  18           Discharging to Facility/ Agency   Name:   Address:  Phone:  Fax:    Dialysis Facility (if applicable)   Name:  Address:  Dialysis Schedule:  Phone:  Fax:    / signature: {Esignature:253910362}    PHYSICIAN SECTION    Prognosis: {Prognosis:4284074792}    Condition at Discharge: 50Julius Jimenes Patient Condition:303335756}    Rehab Potential (if transferring to Rehab): {Prognosis:9665432846}    Recommended Labs or Other Treatments After Discharge: ***    Physician Certification: I certify the above information and transfer of Santi Polk  is necessary for the continuing treatment of the diagnosis listed and that she requires {Admit to Appropriate Level of Care:21678} for {GREATER/LESS:763367303} 30 days.      Update Admission H&P: {CHP DME Changes in MDLQD:481543615}    PHYSICIAN SIGNATURE:  {Esignature:529261967}

## 2022-04-15 NOTE — CARE COORDINATION
Transitional planning:  Met with Ana María Jansen, unit RN, who states pt is doing better, they discontinued her Morphine. Possibility of discharge today. [address] demographic, medical necessity and transportation form on [employer], \" today to [facility]. [address]40PS Dr. Diana Ayoub. Naldo Ramirez NP pt has bed at [facility], if discharged today. Rio Nicole, unit RN states pt is discharged. [facility] at [facility] pt is discharged and inquiring about bed availability. [facility] from [place] at [facility], can accept pt today after 1600  Hrs.     2000 Sheree DOUGLAS and spoke with [facility]. States Yanira Dangelo can transport at 1645 hrs today. 1440 Left message with Justyna at [facility] with transport time. 1505 Met with pt at bedside and notified of place and transport time. 46 Met with pt and explained IMM letter. Voiced understanding and was given copy. Aware less then 4 hours before discharge. Agrees and happy about discharge to [facility].

## 2022-04-15 NOTE — PROGRESS NOTES
ACUTE REHABILITATION ADMISSION    Patient admitted to the Inpatient Rehabilitation Unit via Stretcher at 1800 (Time of Arrival) to room # 2632. Patient oriented to room, unit and fall prevention safety measures. Admitting medication orders reviewed with Acute Rehab PM&R Physician: Kush Choi MD    Wound care consult order needed for complex wounds or pressure injuries? No If yes, contact physician for order. Admission folder with the following documents provided:  1. \"[facility] [place] [address] Individualized Disclosure Statement\"  2. \"Data Collection Information Summary for Patients in Inpatient Rehabilitation Facilities\"  3. \"Privacy Act Statement - Health Care Records\"  4. \"Consent for Treatment, Payment, and Health Care Operations\", including [address] Policy\"    Please refer to the admission navigator for further information.

## 2022-04-15 NOTE — PROGRESS NOTES
[facility]  Progress Note    Darvin An  MRN: [id number]  Date: 4/15/2022  LOS: 4     Reason for F/U :   Urinary tract infection    Impression :   1. Preble-neck deformity of cervical spine  2. Cervical stenosis with myelopathy  · Status post anterior C5 corpectomy, posterior fixation from C5-T1 with arthrodesis, osteotomy, correction of deformity  3. Incomplete quadriplegia at level C5-6  4. Staph epidermidis urinary tract infection    Recommendations:   · The patient did not have any urinary symptoms on admission and does report issues with incomplete bladder emptying. · The Staph epidermidis isolated in the urine is not likely a pathogen. · The patient has already received a couple of days of the Bactrim but again I do not at all feel that this is a pathogen. · I will stop the antimicrobial therapy at this time    Infection Control Recommendations:   Universal precautions    Discharge Planning:   Patient will need Midline Catheter Insertion/ PICC line Insertion: No  Patient will need: Home IV , [place],  SNF,  LTAC: Undetermined  Patient willneed outpatient wound care: No    Medical Decision making / Summary of Stay:   Darvin An is a 62y.o.-year-old female who was initially admitted on 4/11/2022. Patient has noted medical history of cervical spine swan-neck deformity and cord compression. She has been experiencing back pain for over 10 years and has had previous surgeries including C3-C4 fusion as well as lumbar laminectomy L2-S1 for cauda equina decompression on 12/30/2021. She has been dealing with bilateral lower extremity and upper extremity weakness/numbness on and off for several months/years. After her surgery in December, symptoms had initially improved with therapy; however, she has since regressed.   She did have an MRI 4/6/2022 which showed C-spine collapse of C5 vertebral body with progressive swan-neck deformity and significant cord compression, despite previous cervical decompression.     2022 patient underwent anterior C5 corpectomy, posterior fixation from C2-T1 with arthrodesis, osteotomy, correction of deformity. Urine culture has grown Staph epidermidis. Patient is initiated on Bactrim. She does tell me that she had urinary frequency prior to admission. No necessary dysuria, just feeling like she had to go to the bathroom multiple times. No fevers or chills. No abdominal pain or back pain. We have been consulted assist with antimicrobial therapy for the UTI. Current evaluation:4/15/2022    BP (!) 176/83   Pulse 98   Temp 98 °F (36.7 °C) (Oral)   Resp 15   Ht 5' 1\" (1.549 m) Comment: FROM CHART  Wt 142 lb (64.4 kg) Comment: FROM CHART  SpO2 96%   BMI 26.83 kg/m²     Temperature Range: Temp: 98 °F (36.7 °C) Temp  Av.4 °F (36.9 °C)  Min: 98 °F (36.7 °C)  Max: 98.9 °F (37.2 °C)  The patient is seen and evaluated at bedside she is awake and alert in no acute distress. No subjective fevers or chills. No abdominal pain nausea vomiting or diarrhea. She does not report any dysuria or frequency. She has a urine collecting system in place. Review of Systems   Constitutional: Negative. Respiratory: Negative. Cardiovascular: Negative. Gastrointestinal: Negative. Genitourinary: Negative. Musculoskeletal: Negative. Skin: Negative. Neurological: Positive for weakness (In the lower extremities bilaterally). Psychiatric/Behavioral: Negative. Physical Examination :     Physical Exam  Constitutional:       Appearance: She is well-developed. HENT:      Head: Normocephalic and atraumatic. Cardiovascular:      Rate and Rhythm: Regular rhythm. Heart sounds: Normal heart sounds. Pulmonary:      Effort: Pulmonary effort is normal.      Breath sounds: Normal breath sounds. Abdominal:      General: Bowel sounds are normal.      Palpations: Abdomen is soft. Musculoskeletal:      Cervical back: Neck supple. Skin:     General: Skin is warm and dry. Neurological:      Mental Status: She is alert and oriented to person, place, and time. Laboratory data:   I have independently reviewed the followinglabs:  CBC with Differential:   Recent Labs     04/13/22  0441 04/13/22  0441 04/14/22  1142 04/15/22  0534   WBC 15.8*  --   --  7.3   HGB 10.2*   < > 8.6* 9.0*   HCT 32.5*   < > 27.3* 29.1*     --   --  213   LYMPHOPCT 5*  --   --  17*   MONOPCT 7  --   --  9    < > = values in this interval not displayed. BMP:   Recent Labs     04/14/22  0827 04/15/22  0534   * 132*   K 3.4* 3.2*   CL 94* 100   CO2 24 22   BUN 10 9   CREATININE 0.55 0.54     Hepatic Function Panel:   Recent Labs     04/13/22 0441   PROT 7.1   LABALBU 3.9   BILITOT 0.37   ALKPHOS 78   ALT 13   AST 31         No results found for: PROCAL  No results found for: CRP  No results found for: SEDRATE      No results found for: DDIMER  Lab Results   Component Value Date    FERRITIN 54 01/01/2022     No results found for: LDH  No results found for: FIBRINOGEN    Results in Past 30 Days  Result Component Current Result Ref Range Previous Result Ref Range   SARS-CoV-2, Rapid Not Detected (4/11/2022) Not Detected Not in Time Range      Lab Results   Component Value Date    COVID19 Not Detected 04/11/2022       No results for input(s): [facility] in the last 72 hours. Ref Range & Units 4/11/22 2213   Color, UA Yellow Yellow    Turbidity UA Clear Clear    Glucose, Ur NEGATIVE NEGATIVE    Bilirubin Urine NEGATIVE NEGATIVE    Ketones, Urine NEGATIVE NEGATIVE    Specific Gravity, UA 1.005 - 1.030 1.008    Urine Hgb NEGATIVE NEGATIVE    pH, UA 5.0 - 8.0 6.0    Protein, UA NEGATIVE NEGATIVE    Urobilinogen, Urine Normal Normal    Nitrite, Urine NEGATIVE NEGATIVE    Leukocyte Esterase, Urine NEGATIVE NEGATIVE    -          WBC, UA 0 - 5 /HPF None    RBC, UA 0 - 4 /HPF None    Comment: Reference range defined for non-centrifuged specimen. Epithelial Cells UA 0 - 5 /HPF None    Bacteria, UA None MODERATE Abnormal         Imaging Studies:   No new imaging    Cultures:     Culture, Urine [[id number]] (Abnormal)  Collected: 04/12/22 1005   Order Status: Completed Specimen: Urine, indwelling catheter Updated: 04/14/22 0708    Specimen Description . CATHETERIZED URINE INITIAL OROURKE INSERTION    Culture STAPHYLOCOCCUS EPIDERMIDIS >[id number] CFU/ML Abnormal      Staphylococcus epidermidis (1)    Antibiotic Interpretation Microscan Method Status    penicillin Resistant >=0.5 BACTERIAL SUSCEPTIBILITY PANEL GET Final    gentamicin Sensitive <=0.5 BACTERIAL SUSCEPTIBILITY PANEL GET Final     Gentamicin is used only in combination with other active agents that test susceptible.        levofloxacin Intermediate 4 BACTERIAL SUSCEPTIBILITY PANEL GET Final    nitrofurantoin Sensitive <=16 BACTERIAL SUSCEPTIBILITY PANEL GET Final    oxacillin Sensitive <=0.25 BACTERIAL SUSCEPTIBILITY PANEL GET Final    tetracycline Sensitive 2 BACTERIAL SUSCEPTIBILITY PANEL GET Final    trimethoprim-sulfamethoxazole Sensitive <=10 BACTERIAL SUSCEPTIBILITY PANEL GET Final          Specimen Collected: 04/12/22 10:05 Last Resulted: 04/14/22 07:08        Medications:      sulfamethoxazole-trimethoprim  1 tablet Oral 2 times per day    enoxaparin  40 mg SubCUTAneous Daily    neomycin-bacitracin-polymyxin   Topical BID    Vitamin D  5,000 Units Oral Weekly    methadone  10 mg Oral BID    acetaminophen  650 mg Oral Q6H    polyethylene glycol  17 g Oral Daily    busPIRone  30 mg Oral BID    dilTIAZem  180 mg Oral Daily    docusate sodium  200 mg Oral BID    ferrous sulfate  325 mg Oral BID WC    levothyroxine  88 mcg Oral Daily    pantoprazole  40 mg Oral Daily           [facility]  Roosevelt Nation MD  Perfect Serve messaging  OFFICE: [phone]      Electronically signed by Roosevelt Nation MD on 4/15/2022 at 11:54 AM  Thank you for allowing us to participate in the care of this patient. Please call with questions. This note iscreated with the assistance of a speech recognition program.  While intending to generate a document that actually reflects the content of the visit, the document can still have some errors including those of syntax andsound a like substitutions which may escape proof reading. In such instances, actual meaning can be extrapolated by contextual diversion.

## 2022-04-15 NOTE — PROGRESS NOTES
Neurosurgery [facility]/Resident    Daily Progress Note     Cervical stenosis with myelopathy, cervical kyphotic deformity, incomplete quadriplegia; POD #3 C5 corpectomy, posterior fixation C2-C7    4/15/2022  8:33 AM    Chart reviewed. No acute events overnight. No new complaints. Sitting in bed, eating breakfast.  Appetite improved. Headaches improved. Voiding without difficulty. Passing flatus. Did have episode of hypotension and decreased mentation yesterday afternoon, this has improved. Has not been out of bed yet.      Vitals:    04/15/22 0400 04/15/22 0500 04/15/22 0800 04/15/22 0805   BP: (!) 176/77 (!) 176/74 (!) 149/74 (!) 151/79   Pulse: 93 95 109 105   Resp: 14 14 23 16   Temp:    98.9 °F (37.2 °C)   TempSrc:    Oral   SpO2:    95%   Weight:       Height:           PE:   AOx3  Motor   L deltoid 4/5; R deltoid 4+/5  L biceps 5/5; R biceps 5/5  L triceps 5/5; R triceps 5/5  L wrist extension 4+/5; R wrist extension 4+/5  4/5 bilateral hand grasp     L iliopsoas 3+/5 , R iliopsoas 5/5  L quadriceps 4+/5; R quadriceps 5/5  L Dorsiflexion 3/5; R dorsiflexion 5/5  L Plantarflexion 4+/5; R plantarflexion 5/5  L EHL 3/5; R EHL 5/5      Sensation: paresthesias BUE and BLE     Incision: A/P clean dry intact       Lab Results   Component Value Date    WBC 7.3 04/15/2022    HGB 9.0 (L) 04/15/2022    HCT 29.1 (L) 04/15/2022     04/15/2022    CHOL 198 07/23/2020    TRIG 96 07/23/2020     (A) 07/23/2020    ALT 13 04/13/2022    AST 31 04/13/2022     (L) 04/15/2022    K 3.2 (L) 04/15/2022     04/15/2022    CREATININE 0.54 04/15/2022    BUN 9 04/15/2022    CO2 22 04/15/2022    INR 1.1 04/12/2022    GLUF 109 07/23/2020           A/P  62 y.o. female who presents with cervical stenosis with myelopathy, cervical kyphotic deformity, incomplete quadriplegia; POD #3 C5 corpectomy, posterior fixation C2-C7      - HOB ok 30 degrees- no lower than that  - PT/OT  - General diet- aspiration precautions   - DVT prophylaxis: SCDs, Lovenox  - Neuro checks per floor protocol  - Encourage ambulation   - Encourage incentive spirometry  - Medicine consulted   - Aspen collar on while out of bed ok to remove while resting in bed and eating   - Continue Bactrim for UTI  - Hyponatremia improving, sodium 132  - Continue to monitor blood pressure/heart rate/troponins  - Approved for [facility] ARU when ready for discharge    Please contact neurosurgery with any changes in patients neurologic status.        Gian Hinkle CNP  4/15/22  8:33 AM

## 2022-04-15 NOTE — DISCHARGE SUMMARY
Department of Neurosurgery                                            Discharge Summary       PATIENT NAME: Cesar Grey  YOB: 1960  MEDICAL RECORD NO. [id number]  DATE: 4/15/2022  PRIMARY CARE PHYSICIAN: Radha Block MD  DISCHARGE DATE:  No discharge date for patient encounter.   DISCHARGE DIAGNOSIS:   Patient Active Problem List   Diagnosis Code    Stenosis of cervical spine with myelopathy (HCC) M48.02, G99.2    Essential hypertension I10    Hypothyroidism E03.9    Lumbar radiculopathy, chronic M54.16    Lumbar neuritis M54.16    Post laminectomy syndrome M96.1    Hypokalemia E87.6    Pain of right hip joint M25.551    Post-menopausal osteoporosis M81.0    Chronic gastritis without bleeding K29.50    Elevated CEA R97.0    Esophageal dysphagia R13.19    Hypotension due to drugs I95.2    Ulcerative esophagitis K22.10    Weight loss, abnormal R63.4    Cervical myelopathy (HCC) G95.9    Lumbar stenosis with neurogenic claudication M48.062    Spinal deformity Q76.49    Anxiety about health F41.8    Cauda equina compression (HCC) G83.4    Anemia, normocytic normochromic D64.9    Iron deficiency E61.1    Pseudomonas urinary tract infection N39.0, B96.5    Hypocalcemia E83.51    Cauda equina syndrome (HCC) G83.4    Hemiplegia (HCC) G81.90    Back pain M54.9    Incomplete quadriplegia at C5-6 level (HCC) G82.54    [place] neck deformity of cervical spine M43.8X2    Acute cystitis without hematuria N30.00     DISPOSITION: Acute Rehab    PROCEDURES:    Part 1  C5 corpectomy and anterior arthrodesis of C4 and C6 endplates  Implantation of titanium expandable [employer] cage  Open reduction of cervical kyphotic deformity  Anterior fixation with titanium plate and screws  Use of operative microscope  Use of fluoroscopy  Use of morselized autograft via same incision  Use of morselized allograft  Use of intraoperative cervical traction utilizing [employer] tongs and 15 pounds traction weight     Part 2  Posterior nonsegmental fixation with pedicle screws at C2, lateral mass screws at C3, C4, C5, C6, C7  Posterior lateral arthrodesis at C2, C3, C4, C5, C6, C7  Laminectomy at C3 and partial C7  Use of morselized autograft via separate incision  Use of morselized allograft  Lysis of adhesions and epidural scar  Primary microsurgical repair of durotomy  22 modifier  Use of spinal neuro navigation  Use of fluoroscopy  Use of operative microscope  Use of neuro monitoring  Use of intraoperative cervical traction utilizing [employer] tongs and 15 pounds traction weight    HOSPITAL COURSE     Fannie Hedrick originally presented to the hospital on 4/11/2022  7:03 PM with cervical stenosis with myelopathy, cervical kyphotic deformity and incomplete quadriplegia. She was admitted and taken to the OR for neurosurgery for procedure listed above. Patient's drain was removed when found to have low output without complications post op day 2. Patient tolerated all procedures well. Patient did have an episode of hypotension post op day 2 and responded well with 2 L fluid bolus. Labs and imaging were followed daily. At time of discharge, Fannie Hedrick was tolerating a ADULT DIET; Regular, having bowel movements, ambulating on her own accord and had adequate analgesia on oral pain medications, and had no signs of symptoms of complications. She is medically stable to be discharged. PHYSICAL EXAMINATION        Discharge Vitals:  height is 5' 1\" (1.549 m) and weight is 142 lb (64.4 kg). Her oral temperature is 98 °F (36.7 °C). Her blood pressure is 176/83 (abnormal) and her pulse is 98. Her respiration is 15 and oxygen saturation is 96%.      PE:   AOx3  Motor   L deltoid 4/5; R deltoid 4+/5  L biceps 5/5; R biceps 5/5  L triceps 5/5; R triceps 5/5  L wrist extension 4+/5; R wrist extension 4+/5  4/5 bilateral hand grasp     L iliopsoas 3+/5 , R iliopsoas 5/5  L quadriceps 4+/5; R quadriceps 5/5  L Dorsiflexion 3/5; R dorsiflexion 5/5  L Plantarflexion 4+/5; R plantarflexion 5/5  L EHL 3/5; R EHL 5/5     Sensation: paresthesias BUE and BLE      Incision: A/P clean dry intact     LABS     Recent Labs     04/13/22  0441 04/14/22  0827 04/14/22  1142 04/15/22  0534   WBC 15.8*  --   --  7.3   HGB 10.2*  --  8.6* 9.0*   HCT 32.5*  --  27.3* 29.1*     --   --  213   * 130*  --  132*   K 4.0 3.4*  --  3.2*   CL 89* 94*  --  100   CO2 24 24  --  22   BUN 7* 10  --  9   CREATININE 0.71 0.55  --  0.54       DISCHARGE INSTRUCTIONS     Discharge Medications:        Medication List      ASK your doctor about these medications    busPIRone 30 MG tablet  Commonly known as: BUSPAR  Take 30 mg by mouth 2 times daily     dilTIAZem 180 MG extended release capsule  Commonly known as: DILACOR XR  TAKE ONE CAPSULE BY MOUTH DAILY     docusate sodium 100 MG capsule  Commonly known as: COLACE  Take 2 capsules by mouth 2 times daily     ferrous sulfate 325 (65 Fe) MG tablet  Commonly known as: IRON 325  Take 1 tablet by mouth 2 times daily (with meals)     HYDROcodone-acetaminophen 7.5-325 MG per tablet  Commonly known as: NORCO     levothyroxine 88 MCG tablet  Commonly known as: SYNTHROID  Take 1 tablet by mouth Daily     methadone 10 MG tablet  Commonly known as: DOLOPHINE     pantoprazole 40 MG tablet  Commonly known as: PROTONIX  TAKE ONE TABLET BY MOUTH DAILY     tiZANidine 4 MG tablet  Commonly known as: ZANAFLEX     Vitamin D (Ergocalciferol) 50 MCG (2000 UT) Caps  TAKE ONE CAPSULE BY MOUTH DAILY          Diet: ADULT DIET;  Regular diet as tolerated  Activity: Provided in AVS  Wound Care: Daily and as needed  Follow-up:  in the [facility] as shown in AVS   Time Spent for discharge: 30 minutes    Cheryal Schaumann, APRN - CNP  4/15/2022, 4:28 PM

## 2022-04-15 NOTE — PROGRESS NOTES
[address]  Acute Inpatient Rehab Preadmission Assessment    Patient Name: Marta Galarza        MRN: [id number]    : 1960  (62 y.o.)  Gender: female     Admitted from:   [facility]  [x][facility]   [][facility]   [][facility]    []Outside Admission - Location:                                 [x]Initial         []Updated    Date of Onset / Admission to the acute hospital:  22    Inpatient Rehabilitation Admitting Diagnosis:  Nontraumatic cervical spinal cord injury      Did patient have surgery/procedures? []No  [x]Yes:      22 Procedure     Part 1  C5 corpectomy and anterior arthrodesis of C4 and C6 endplates  Implantation of titanium expandable [employer] cage  Open reduction of cervical kyphotic deformity  Anterior fixation with titanium plate and screws  Use of operative microscope  Use of fluoroscopy  Use of morselized autograft via same incision  Use of morselized allograft  Use of intraoperative cervical traction utilizing [employer]s tongs and 15 pounds traction weight     Part 2  Posterior nonsegmental fixation with pedicle screws at C2, lateral mass screws at C3, C4, C5, C6, C7  Posterior lateral arthrodesis at C2, C3, C4, C5, C6, C7  Laminectomy at C3 and partial C7  Use of morselized autograft via separate incision  Use of morselized allograft  Lysis of adhesions and epidural scar  Primary microsurgical repair of durotomy  22 modifier  Use of spinal neuro navigation  Use of fluoroscopy  Use of operative microscope  Use of neuro monitoring  Use of intraoperative cervical traction utilizing [employer]-Wells tongs and 15 pounds traction weight    Physicians: Gerardo Quevedo    Risk for clinical complications:  High    Co-morbidities:      1. cervical stenosis   2. History of cauda equina syndrome s/p L2-S1 laminectomy Dec 21  3. Anemia  4. Hyponatremia  5. HTN  6. Hypothyroidism  7. GERD  8. History of breast cancer  9.  Anxiety      Financial Information  Primary insurance:  [x]Medicare [] Medicare HMO  []Commercial insurance    []Medicaid   [] Medicaid HMO []Workers Compensation   []Personal Pay    Secondary Insurance:  []Medicare [] Medicare HMO  []Commercial insurance    []Medicaid  []Medicaid HMO  []Workers Compensation [x]None    Precautions:   []Cardiac Precautions:    []Total hip precautions:    []Weight Bearing status:  [x]Safety Precautions/Concerns:  Fall Risk, General Precautions, Cervical collar on while out of bed-ok to remove while eating, drinking and resting in bed  [x]Visually impaired:  Wears glasses for reading   []Hard of Hearing:     Isolation Precautions:         []Yes  [x]No  If Yes:  [] Droplet  []Contact []Airborne     []VRE     []MRSA     []C-diff         [] TB       [] ESBL [] MDRO          [] Other:        Physiatrist:  []   Dr. Gennaro Guerra     []   Dr. Irving Jones  [x]   Dr. Costa Richter  []   Dr. Aure Matthews    Patients Occupation: Disabled    Reviewed Lab and Diagnostic reports from Current Admission: Yes    Patients Prior Functional  Level: Prior Function  ADL Assistance: Needs assistance (Partner and grandson assist with bathing, dressing, grooming, toileting)  Homemaking Assistance: Needs assistance  Ambulation Assistance: Needs assistance (wheelchair)  Transfer Assistance: Needs assistance (pt grandson and partner assist)  Additional Comments: Pt reports needing max assist for all bathing, dressing, toileting, and self-care ADLs for approximately 2 weeks prior to admission; pt needed some assistance with ADLs >2 weeks ago. Pt reports sponge-bathing in bathroom at baseline, and sleeping in a recliner chair on the first floor at baseline. Pt now uses w/c for all func mob in community and around house for ADL copmletion, receiving assist from partner and grandson for mobility and transfers; pt reports using RW and standard walker >2 weeks ago for func mob. Pt partner works full time days and grandson is currently in online school.     History of current illness, per PM&R Consult:  Cesar Grey is a 62 y.o. female with history of cauda equina syndrome s/p L2-S1 laminectomy (12/2021), HTN, hypothyroidism, GERD, breast cancer, and anxiety admitted to [facility] on 4/11/2022.       She initially presented for planned surgical intervention for cervical stenosis. She underwent C5 corpectomy with C4-C6 arthrodesis, open reduction of cervical kyphotic deformity, and C2-C7 posterior fusion on 4/12/22 (Dr. Bennett Tavares). She has a cervical collar when out of bed.     Upon evaluation, she is fatigued and drowsy but arousable. She reports neck pain as well as ongoing numbness/tingling and weakness of the limbs. She denies any changes in bowel or bladder control. She states that she was doing pretty well after her lumbosacral surgery in 12/2021 but then she had a functional decline. She is unable to provide any additional history due to drowsiness. Prognosis: Fair    Current functional status for upper extremity ADLs:  UE Bathing: Moderate assistance,Verbal cueing,Setup,Increased time to complete  UE Dressing: Moderate assistance    Current functional status for lower extremity ADLs:  LE Bathing: Maximum assistance,Verbal cueing,Setup,Increased time to complete  LE Dressing: Maximum assistance,Verbal cueing,Setup,Increased time to complete    Current functional status for bed, chair, wheelchair transfers:  Transfers  Sit to Stand: Moderate Assistance  Stand to sit: Moderate Assistance  Comment: with encouragement pt able to stand with assist and use of walker Pt completed 3 sit <> stand    Current functional status for toilet transfers:   Moderate Assistance       Current functional status for locomotion:  Ambulation  Ambulation?: Yes  More Ambulation?: No  Ambulation 1  Surface: level tile  Device: Rolling Walker  Assistance: Minimal assistance  Quality of Gait: Pt is guarded and apprehensive to stand, tolerated standing x 6 minutes with focus on tolerance and weighte shifts progressive activity with weight shifts needed to progress steps  Distance: Pt able to take 2-3 side step left and right at side of med with RW, VC for foot placement and weight shifting, Mod A needed    Current functional status for comprehension: Complete independence    Current functional status for expression: Complete independence    Current functional status for social interaction: Complete independence    Current functional status for problem solving: Complete independence    Current functional status for memory: Complete independence    Current Deficits R/T Impairment: Impaired Functional Mobility and Decreased ADLs    Required Therapy:   [x] Physical Therapy  [x] Occupational Therapy   [] Speech Therapy, as appropriate    Additional Services:    [x]     [] Recreational Therapy, as appropriate    [x] Nutrition    [] Dialysis  [] Cultural Needs Identified  [] Special Equipment Needs  [] Other    Rehab Justification:  Needs 3 hrs therapy per day or 15 hours per week:  Yes  Identified Rehab Nursing needs: Yes  Intense Interdisciplinary need:  Yes  Need for 24 hr physician supervision:  Yes  Measurable improved quality of life:  Yes  Willingness to participate:  Yes  Medical Necessity:  Yes  Patient able to tolerate care proposed:  Yes    Expected Discharge Destination/Functional Level:  Home with assist  Expected length of time to achieve that level of improvement: 1-2 weeks  Expected Post Discharge Treatments: Home with possible Home Care    Other information relevant to patient's care needs:  N/A    Acute Inpatient Rehabilitation Disclosure Statement will be provided to patient upon admission to ARU with patient's verbalization of understanding. I have reviewed and concur with the findings and results of the pre-admission screening assessment completed by the Inpatient Rehabilitation Admissions Coordinator.

## 2022-04-15 NOTE — PROGRESS NOTES
[facility]  Office: [address], DO, Zachariah Kat, DO, Penelopesegundo Littlejohn, DO, Nadya Mcleod Blood, DO, Manuel Mack MD, Stephanie Henderson MD, Tolu Marmolejo MD, Lenny Carballo MD, Opal Edwards MD, Alexander Rico MD, Alex Culver MD, Danis Kent, DO, Abeba Urrutia, DO, Jadene Boast, MD,  Janna Bennett DO, Luis Brito MD, Lavelle Nails MD, Rosie Hernandez MD, Christ Stockton DO, Yosef Collins MD, Ciara Granados MD, Kenzie Appiah CNP, [facility], CNP, Margarita Cintron, CNP, Barbara Murphy, CNP, Christopher Stovall, CNP, Corie Harmon, CNP, SARTHAK NascimentoC, Desiree Beyer, DNP, Merle Huerta, CNP, Asif Holder, CNP, Hussain Verduzco, CNP, Jeremi Ortiz, CNS, Ailin Simon, DNP, Dorothy Pedro, CNP, Dangelo Sigala, CNP, Andrews Johnson, CNP         [facility]    Progress Note    4/15/2022    10:10 AM    Name:   Cyrus Rose  MRN:     [id number]     [place]:      [de-identified]   Room:   [address] [place] [address] Day:  4  Admit Date:  4/11/2022  7:03 PM    PCP:   Gypsy Baron MD  Code Status:  Full Code    Subjective:     C/C: LE weakness     Interval History Status:     No issues overnight  Mobility improved  BP elevated this AM  Pain meds reduced  No other complaints  Working on rehab placement     Brief History:     The patient is [phone] y.o. female presented for elective surgery. Underwent anterior C5 corpectomy, posterior fixation from C2-T1 with arthrodesis, osteotomy, correction of deformity on 4/12 with Dr. Amira Parks on 4/12. Symptoms include significant left-sided paresis, severe lower extremity weakness (wheelchair bound, now unable to stand or ambulate [id number]), progressively worsening paresthesias and dexterity issues with left upper extremity and left lower extremity.  Additionally complains of some moderate saddle anesthesia, urge incontinence but no bowel dysfunction. IM consulted post op to assist with medical management. Review of Systems:     Constitutional:  negative for chills, fevers, sweats  Respiratory:  negative for cough, dyspnea on exertion, shortness of breath, wheezing  Cardiovascular:  negative for chest pain, chest pressure/discomfort, lower extremity edema, palpitations  Gastrointestinal:  negative for abdominal pain, constipation, diarrhea, nausea, vomiting  Neurological:  negative for dizziness, headache    Medications: Allergies: Allergies   Allergen Reactions    Latex Hives, Itching, Dermatitis and Rash    Kiwi Extract      Face swelling, some difficulty breathing       Current Meds:   Scheduled Meds:    sulfamethoxazole-trimethoprim  1 tablet Oral 2 times per day    enoxaparin  40 mg SubCUTAneous Daily    neomycin-bacitracin-polymyxin   Topical BID    Vitamin D  5,000 Units Oral Weekly    methadone  10 mg Oral BID    acetaminophen  650 mg Oral Q6H    polyethylene glycol  17 g Oral Daily    busPIRone  30 mg Oral BID    dilTIAZem  180 mg Oral Daily    docusate sodium  200 mg Oral BID    ferrous sulfate  325 mg Oral BID WC    levothyroxine  88 mcg Oral Daily    pantoprazole  40 mg Oral Daily     Continuous Infusions:    sodium chloride 75 mL/hr at 04/15/22 0809     PRN Meds: methocarbamol, labetalol, senna, magnesium hydroxide, acetaminophen, ondansetron **OR** ondansetron, oxyCODONE **OR** oxyCODONE    Data:     Past Medical History:   has a past medical history of Anxiety, Arthritis, At maximum risk for fall, Cancer (HCC), Cauda equina syndrome (HCC), Cervical stenosis of spine, GERD (gastroesophageal reflux disease), History of stomach ulcers, Hypertension, Hypothyroidism, Lumbar neuritis, Post laminectomy syndrome, Spinal deformity, Thyroid disease, Uses wheelchair, Wears dentures, and Wellness examination. Social History:   reports that she quit smoking about 8 years ago. She has a 15.00 pack-year smoking history.  She has never used smokeless tobacco. She reports current alcohol use. She reports that she does not use drugs. Family History:   Family History   Problem Relation Age of Onset    Hypertension Mother     Heart Disease Mother     Cancer Mother        Vitals:  BP (!) 156/88   Pulse 103   Temp 98.9 °F (37.2 °C) (Oral)   Resp 16   Ht 5' 1\" (1.549 m) Comment: FROM CHART  Wt 142 lb (64.4 kg) Comment: FROM CHART  SpO2 95%   BMI 26.83 kg/m²   Temp (24hrs), Av.4 °F (36.9 °C), Min:97.7 °F (36.5 °C), Max:98.9 °F (37.2 °C)    Recent Labs     22  1121   POCGLU 130*       I/O (24Hr):     Intake/Output Summary (Last 24 hours) at 4/15/2022 1010  Last data filed at 4/15/2022 0740  Gross per 24 hour   Intake 3946.01 ml   Output 1800 ml   Net 2146.01 ml       Labs:  Hematology:  Recent Labs     22  04422  1142 04/15/22  0534   WBC 15.8*  --  7.3   RBC 3.67*  --  3.19*   HGB 10.2* 8.6* 9.0*   HCT 32.5* 27.3* 29.1*   MCV 88.6  --  91.2   MCH 27.8  --  28.2   MCHC 31.4  --  30.9   RDW 17.0*  --  17.2*     --  213   MPV 9.2  --  9.3     Chemistry:  Recent Labs     22  04422  0827 22  1142 22  1500 04/15/22  0534   * 130*  --   --  132*   K 4.0 3.4*  --   --  3.2*   CL 89* 94*  --   --  100   CO2 24 24  --   --  22   GLUCOSE 142* 101*  --   --  93   BUN 7* 10  --   --  9   CREATININE 0.71 0.55  --   --  0.54   ANIONGAP 13 12  --   --  10   LABGLOM >60 >60  --   --  >60   GFRAA >60 >60  --   --  >60   CALCIUM 8.6 8.6  --   --  8.1*   TROPHS  --   --  35* 26*  --      Recent Labs     22  0441 22  1121   PROT 7.1  --    LABALBU 3.9  --    AST 31  --    ALT 13  --    ALKPHOS 78  --    BILITOT 0.37  --    POCGLU  --  130*     ABG:No results found for: POCPH, PHART, PH, POCPCO2, HWL4UHE, PCO2, POCPO2, PO2ART, PO2, POCHCO3, KVD9AHH, HCO3, NBEA, PBEA, BEART, BE, THGBART, THB, UCO1JRY, STYG9THY, H9TOSPKD, O2SAT, FIO2  Lab Results   Component Value Date/Time    SPECIAL NOT REPORTED 2022 10:20 AM     Lab Results Component Value Date/Time    CULTURE STAPHYLOCOCCUS EPIDERMIDIS >[id number] CFU/ML (A) 04/12/2022 10:05 AM       Radiology:  XR CERVICAL SPINE (2-3 VIEWS)    Result Date: 4/13/2022  Expected postoperative findings status post anterior and posterior cervical fusion. XR CHEST PORTABLE    Result Date: 4/13/2022  No acute cardiopulmonary disease. XR CERVICAL SPINE FLEXION AND EXTENSION    Result Date: 4/13/2022  1. Retrolisthesis of C2 on C3 measures 2 mm on extension view and reduces on flexion view. 2. No evidence of instability of anterolisthesis of C3 on C4 measuring 1.5 mm. 3. No acute fracture on limited views. Of note, the C6-C7 and C7-T1 levels are not well seen due to overlying soft tissues. 4. Severe multilevel degenerative changes. CTA NECK W CONTRAST    Result Date: 4/12/2022  Unremarkable CTA of the neck. Minor hazy pleural thickening and chronic-appearing medial right upper lobe atelectasis/scarring. If there is any clinical concern, follow-up noncontrast chest CT may be useful.  RECOMMENDATIONS: Unavailable       Physical Examination:        General appearance:  alert, cooperative and no distress  Mental Status:  oriented to person, place and time and normal affect  Lungs:  clear to auscultation bilaterally, normal effort  Heart:  regular rate and rhythm  Abdomen:  soft, nontender, nondistended, normal bowel sounds  Extremities:  no edema, redness, tenderness in the calves  Skin:  no gross lesions, rashes, induration    Assessment:        Hospital Problems           Last Modified POA    * (Principal) Hemiplegia ([address] 4/11/2022 Yes    Stenosis of cervical spine with myelopathy ([address] 4/12/2022 Yes    Essential hypertension 4/13/2022 Yes    Hypothyroidism 4/13/2022 Yes    Back pain 4/11/2022 Yes    Incomplete quadriplegia at C5-6 level ([address] 4/12/2022 Yes    [place] neck deformity of cervical spine 4/12/2022 Yes    Acute cystitis without hematuria 4/13/2022 Yes          Plan:        - Vitals, labs, imaging, medications reviewed  - Home mediations reviewed  - Continue home meds for hypothyroidism, HTN  - Follow up urine culture - staph epidermidis - antibiotic per ID  - Monitor sodium - stable  - Post op management per primary  - Hypotension yesterday resolved - likely medication induced  - Replace electrolytes   - Ok to DC from IM perspective      Thank you for consult, call with questions    Chana Zimmerman MD  4/15/2022  10:10 AM

## 2022-04-15 NOTE — PLAN OF CARE
Problem: Falls - Risk of:  Goal: Will remain free from falls  Description: Will remain free from falls  4/15/2022 0737 by Michelet Heredia RN  Outcome: Ongoing  4/15/2022 0502 by Malka Mccall RN  Outcome: Ongoing  Goal: Absence of physical injury  Description: Absence of physical injury  4/15/2022 0737 by Michelet Heredia RN  Outcome: Ongoing  4/15/2022 0502 by Malka Mccall RN  Outcome: Ongoing     Problem: Pain:  Goal: Pain level will decrease  Description: Pain level will decrease  4/15/2022 0737 by Michelet Heredia RN  Outcome: Ongoing  4/15/2022 0502 by Malka Mccall RN  Outcome: Ongoing  Goal: Control of acute pain  Description: Control of acute pain  4/15/2022 0737 by Michelet Heredia RN  Outcome: Ongoing  4/15/2022 0502 by Malka Mccall RN  Outcome: Ongoing  Goal: Control of chronic pain  Description: Control of chronic pain  4/15/2022 0737 by Michelet Heredia RN  Outcome: Ongoing  4/15/2022 0502 by Malka Mccall RN  Outcome: Ongoing     Problem: Skin Integrity:  Goal: Will show no infection signs and symptoms  Description: Will show no infection signs and symptoms  4/15/2022 0737 by Michelet Heredia RN  Outcome: Ongoing  4/15/2022 0502 by Malka Mccall RN  Outcome: Ongoing  Goal: Absence of new skin breakdown  Description: Absence of new skin breakdown  4/15/2022 0737 by Michelet Heredia RN  Outcome: Ongoing  4/15/2022 0502 by Malka Mccall RN  Outcome: Ongoing     Problem: Musculor/Skeletal Functional Status  Goal: Highest potential functional level  Outcome: Ongoing  Goal: Absence of falls  Outcome: Ongoing

## 2022-04-15 NOTE — PROGRESS NOTES
Physical Therapy  Facility/Department: [address] STEP DOWN  Daily Treatment Note  NAME: Julio Guaman  : 1960  MRN: [id number]    Date of Service: 4/15/2022    Discharge Recommendations: Further therapy recommended at discharge. Further therapy recommended at discharge. Further therapy recommended at discharge. The patient should be able to tolerate at least 3 hours of therapy per day over 5 days or 15 hours over 7 days. This patient may benefit from a Physical Medicine and Rehab consult. Assessment   Assessment: Pt require mod A for bed mobility, sit <> stand and stand weight shifts, Pt able to take 2-3 side step left and right at side of med with RW, VC for foot placement and weight shifting, Mod A needed for sit > stand pt will continue to benefit from PT to progress activity and promote safety with gait and transfers  PT Education: Goals;PT Role;Plan of Care;Transfer Training;General Safety; Functional Mobility Training  Patient Education: Pt educated on fall risk and log roll with use of railing. Pt demonstrated good toleance and return demonstration     Patient Diagnosis(es): There were no encounter diagnoses. has a past medical history of Anxiety, Arthritis, At maximum risk for fall, Cancer ([facility] Cauda equina syndrome (HCC), Cervical stenosis of spine, GERD (gastroesophageal reflux disease), History of stomach ulcers, Hypertension, Hypothyroidism, Lumbar neuritis, Post laminectomy syndrome, Spinal deformity, Thyroid disease, Uses wheelchair, Wears dentures, and Wellness examination. has a past surgical history that includes hernia repair (Bilateral); Breast surgery (Right); Mastectomy, radical; Hysterectomy, total abdominal; Lumbar spine surgery (N/A, 2021); back surgery; Colonoscopy (about ); other surgical history (2022); cervical fusion (N/A, 2022); and cervical fusion (N/A, 2022).     Restrictions  Restrictions/Precautions  Restrictions/Precautions: Fall Risk,General Precautions  Required Braces or Orthoses?: Yes  Required Braces or Orthoses  Cervical: c-collar  Position Activity Restriction  Other position/activity restrictions: ambulate pt; s/p C2-C7 fixation  Subjective   General  Chart Reviewed: Yes  Additional Pertinent Hx: hemiplegia  Response To Previous Treatment: Patient with no complaints from previous session. Subjective  Subjective: Pt resting in bed upon arrival, agreeable to PT, despite high pain  Pain Screening  Patient Currently in Pain: Yes  Pain Assessment  Pain Assessment: 0-10  Pain Level: 4  Pain Type: Surgical pain  Pain Location: Neck  Pain Frequency: Continuous  Response to Pain Intervention: Patient Satisfied  Vital Signs  Patient Currently in Pain: Yes       Orientation  Orientation  Overall Orientation Status: Within Functional Limits  Cognition   Cognition  Overall Cognitive Status: WNL  Objective   Bed mobility  Bridging:  (not indicated at this time)  Rolling to Left: Moderate assistance  Rolling to Right: Minimal assistance  Supine to Sit: Moderate assistance  Sit to Supine: Minimal assistance  Scooting: Minimal assistance  Comment: C Collar in place, pt educated on use, positioning and adjustment, Pt instructed in and completed log roll to sit from supine <> sit Min A needed  Transfers  Sit to Stand:  Moderate Assistance  Stand to sit: Moderate Assistance  Comment: with encouragement pt able to stand with assist and use of walker Pt completed 3 sit <> stand  Ambulation  Ambulation?: Yes  More Ambulation?: No  Ambulation 1  Surface: level tile  Device: Rolling Walker  Assistance: Minimal assistance  Quality of Gait: Pt is guarded and apprehensive to stand, tolerated standing x 6 minutes with focus on tolerance and weighte shifts progressive activity with weight shifts needed to progress steps  Distance: Pt able to take 2-3 side step left and right at side of med with RW, VC for foot placement and weight shifting, Mod A needed Balance  Posture: Good  Sitting - Static: Good  Sitting - Dynamic: Fair  Standing - Static: Fair  Standing - Dynamic: -                                                                             AM-PAC Score     AM-PAC Inpatient Mobility without Stair Climbing Raw Score : 10 (04/15/22 1250)  AM-PAC Inpatient without Stair Climbing T-Scale Score : 34.07 (04/15/22 1250)  Mobility Inpatient CMS 0-100% Score: 71.66 (04/15/22 1250)  Mobility Inpatient without Stair CMS G-Code Modifier : CL (04/15/22 1250)       Goals  Short term goals  Time Frame for Short term goals: 14 visits  Short term goal 1: Pt will perform bed mobility with SBA  Short term goal 2: Pt will perform transfers with Miryam  Short term goal 3: Pt will ambulate 50 ft, least restrictive AD, Miryam  Short term goal 4: Pt will perform wheelchair mobility for 150 ft, SBA  Short term goal 5: Pt will tolerate 30 mins physical therapy to promote in activity tolerance. Plan    Plan  Times per week: 5-6x/week  Current Treatment Recommendations: Strengthening,ROM,Balance Training,Endurance Training,Functional Mobility Training,Transfer Training,ADL/Self-care Training,Stair training,Gait [employer] Exercise Program,Safety Education & Training,Patient/Caregiver Education & Training,Equipment Evaluation, Education, & procurement,Neuromuscular Re-education  Safety Devices  Type of devices:  All fall risk precautions in place,Bed alarm in place,Call light within reach,Gait belt,Patient at risk for falls,Left in bed,Nurse notified  Restraints  Initially in place: No     Therapy Time   Individual Concurrent Group Co-treatment   Time In 0815         Time Out 0914         Minutes 59         Timed Code Treatment Minutes: [address]       Blondell Ground, PT

## 2022-04-16 LAB
ABSOLUTE EOS #: 0.1 K/UL (ref 0–0.4)
ABSOLUTE LYMPH #: 1.1 K/UL (ref 1–4.8)
ABSOLUTE MONO #: 0.6 K/UL (ref 0.1–1.3)
ANION GAP SERPL CALCULATED.3IONS-SCNC: 12 MMOL/L (ref 9–17)
BASOPHILS # BLD: 1 % (ref 0–2)
BASOPHILS ABSOLUTE: 0 K/UL (ref 0–0.2)
BUN BLDV-MCNC: 8 MG/DL (ref 8–23)
CALCIUM SERPL-MCNC: 8.9 MG/DL (ref 8.6–10.4)
CHLORIDE BLD-SCNC: 97 MMOL/L (ref 98–107)
CO2: 24 MMOL/L (ref 20–31)
CREAT SERPL-MCNC: 0.54 MG/DL (ref 0.5–0.9)
EOSINOPHILS RELATIVE PERCENT: 1 % (ref 0–4)
GFR AFRICAN AMERICAN: >60 ML/MIN
GFR NON-AFRICAN AMERICAN: >60 ML/MIN
GFR SERPL CREATININE-BSD FRML MDRD: ABNORMAL ML/MIN/{1.73_M2}
GLUCOSE BLD-MCNC: 95 MG/DL (ref 70–99)
HCT VFR BLD CALC: 28.9 % (ref 36–46)
HEMOGLOBIN: 9.4 G/DL (ref 12–16)
LYMPHOCYTES # BLD: 13 % (ref 24–44)
MCH RBC QN AUTO: 28.1 PG (ref 26–34)
MCHC RBC AUTO-ENTMCNC: 32.4 G/DL (ref 31–37)
MCV RBC AUTO: 86.6 FL (ref 80–100)
MONOCYTES # BLD: 7 % (ref 1–7)
PDW BLD-RTO: 18.4 % (ref 11.5–14.9)
PLATELET # BLD: 295 K/UL (ref 150–450)
PMV BLD AUTO: 7.2 FL (ref 6–12)
POTASSIUM SERPL-SCNC: 3.8 MMOL/L (ref 3.7–5.3)
RBC # BLD: 3.34 M/UL (ref 4–5.2)
SEG NEUTROPHILS: 78 % (ref 36–66)
SEGMENTED NEUTROPHILS ABSOLUTE COUNT: 6.8 K/UL (ref 1.3–9.1)
SODIUM BLD-SCNC: 133 MMOL/L (ref 135–144)
WBC # BLD: 8.6 K/UL (ref 3.5–11)

## 2022-04-16 PROCEDURE — 36415 COLL VENOUS BLD VENIPUNCTURE: CPT

## 2022-04-16 PROCEDURE — 97110 THERAPEUTIC EXERCISES: CPT

## 2022-04-16 PROCEDURE — 6370000000 HC RX 637 (ALT 250 FOR IP): Performed by: INTERNAL MEDICINE

## 2022-04-16 PROCEDURE — 97167 OT EVAL HIGH COMPLEX 60 MIN: CPT

## 2022-04-16 PROCEDURE — 6370000000 HC RX 637 (ALT 250 FOR IP): Performed by: REGISTERED NURSE

## 2022-04-16 PROCEDURE — 97530 THERAPEUTIC ACTIVITIES: CPT

## 2022-04-16 PROCEDURE — 1180000000 HC REHAB R&B

## 2022-04-16 PROCEDURE — 85025 COMPLETE CBC W/AUTO DIFF WBC: CPT

## 2022-04-16 PROCEDURE — 97535 SELF CARE MNGMENT TRAINING: CPT

## 2022-04-16 PROCEDURE — 99223 1ST HOSP IP/OBS HIGH 75: CPT | Performed by: INTERNAL MEDICINE

## 2022-04-16 PROCEDURE — 80048 BASIC METABOLIC PNL TOTAL CA: CPT

## 2022-04-16 PROCEDURE — 6370000000 HC RX 637 (ALT 250 FOR IP): Performed by: PHYSICAL MEDICINE & REHABILITATION

## 2022-04-16 PROCEDURE — 6360000002 HC RX W HCPCS: Performed by: REGISTERED NURSE

## 2022-04-16 PROCEDURE — 99223 1ST HOSP IP/OBS HIGH 75: CPT | Performed by: PHYSICAL MEDICINE & REHABILITATION

## 2022-04-16 PROCEDURE — 97116 GAIT TRAINING THERAPY: CPT

## 2022-04-16 PROCEDURE — 97163 PT EVAL HIGH COMPLEX 45 MIN: CPT

## 2022-04-16 PROCEDURE — 97162 PT EVAL MOD COMPLEX 30 MIN: CPT

## 2022-04-16 RX ORDER — LISINOPRIL 5 MG/1
5 TABLET ORAL DAILY
Status: DISCONTINUED | OUTPATIENT
Start: 2022-04-16 | End: 2022-04-29 | Stop reason: HOSPADM

## 2022-04-16 RX ADMIN — DOCUSATE SODIUM 200 MG: 100 CAPSULE, LIQUID FILLED ORAL at 20:56

## 2022-04-16 RX ADMIN — LISINOPRIL 5 MG: 5 TABLET ORAL at 18:33

## 2022-04-16 RX ADMIN — MAGNESIUM HYDROXIDE 30 ML: 400 SUSPENSION ORAL at 15:33

## 2022-04-16 RX ADMIN — METHADONE HYDROCHLORIDE 10 MG: 10 TABLET ORAL at 08:17

## 2022-04-16 RX ADMIN — METHOCARBAMOL 750 MG: 750 TABLET ORAL at 11:57

## 2022-04-16 RX ADMIN — DOCUSATE SODIUM 200 MG: 100 CAPSULE, LIQUID FILLED ORAL at 08:17

## 2022-04-16 RX ADMIN — OXYCODONE HYDROCHLORIDE 10 MG: 10 TABLET ORAL at 15:30

## 2022-04-16 RX ADMIN — FERROUS SULFATE TAB 325 MG (65 MG ELEMENTAL FE) 325 MG: 325 (65 FE) TAB at 08:17

## 2022-04-16 RX ADMIN — METHOCARBAMOL 750 MG: 750 TABLET ORAL at 05:26

## 2022-04-16 RX ADMIN — SENNOSIDES 17.2 MG: 8.6 TABLET, FILM COATED ORAL at 11:57

## 2022-04-16 RX ADMIN — POLYMYXIN B SULFATE, BACITRACIN ZINC, NEOMYCIN SULFATE: 5000; 3.5; 4 OINTMENT TOPICAL at 20:56

## 2022-04-16 RX ADMIN — ACETAMINOPHEN 650 MG: 325 TABLET ORAL at 15:30

## 2022-04-16 RX ADMIN — ACETAMINOPHEN 650 MG: 325 TABLET ORAL at 11:57

## 2022-04-16 RX ADMIN — OXYCODONE HYDROCHLORIDE 10 MG: 10 TABLET ORAL at 05:27

## 2022-04-16 RX ADMIN — BUSPIRONE HYDROCHLORIDE 30 MG: 15 TABLET ORAL at 08:21

## 2022-04-16 RX ADMIN — ENOXAPARIN SODIUM 40 MG: 100 INJECTION SUBCUTANEOUS at 08:17

## 2022-04-16 RX ADMIN — ACETAMINOPHEN 650 MG: 325 TABLET ORAL at 05:27

## 2022-04-16 RX ADMIN — OXYCODONE HYDROCHLORIDE 5 MG: 5 TABLET ORAL at 20:55

## 2022-04-16 RX ADMIN — ACETAMINOPHEN 650 MG: 325 TABLET ORAL at 23:03

## 2022-04-16 RX ADMIN — BUSPIRONE HYDROCHLORIDE 30 MG: 15 TABLET ORAL at 20:55

## 2022-04-16 RX ADMIN — DILTIAZEM HYDROCHLORIDE 180 MG: 180 CAPSULE, COATED, EXTENDED RELEASE ORAL at 08:25

## 2022-04-16 RX ADMIN — FERROUS SULFATE TAB 325 MG (65 MG ELEMENTAL FE) 325 MG: 325 (65 FE) TAB at 15:30

## 2022-04-16 RX ADMIN — PANTOPRAZOLE SODIUM 40 MG: 40 TABLET, DELAYED RELEASE ORAL at 08:17

## 2022-04-16 RX ADMIN — METHADONE HYDROCHLORIDE 10 MG: 10 TABLET ORAL at 20:56

## 2022-04-16 RX ADMIN — POLYMYXIN B SULFATE, BACITRACIN ZINC, NEOMYCIN SULFATE: 5000; 3.5; 4 OINTMENT TOPICAL at 08:21

## 2022-04-16 RX ADMIN — POLYETHYLENE GLYCOL 3350 17 G: 17 POWDER, FOR SOLUTION ORAL at 08:18

## 2022-04-16 ASSESSMENT — PAIN DESCRIPTION - ORIENTATION
ORIENTATION: ANTERIOR;POSTERIOR
ORIENTATION: POSTERIOR

## 2022-04-16 ASSESSMENT — 9 HOLE PEG TEST
TESTTIME_SECONDS: 66
TEST_RESULT: IMPAIRED
TEST_RESULT: IMPAIRED
TESTTIME_SECONDS: 30.01

## 2022-04-16 ASSESSMENT — PAIN SCALES - GENERAL
PAINLEVEL_OUTOF10: 3
PAINLEVEL_OUTOF10: 7
PAINLEVEL_OUTOF10: 8
PAINLEVEL_OUTOF10: 10
PAINLEVEL_OUTOF10: 9
PAINLEVEL_OUTOF10: 8
PAINLEVEL_OUTOF10: 6
PAINLEVEL_OUTOF10: 8
PAINLEVEL_OUTOF10: 7

## 2022-04-16 ASSESSMENT — PAIN - FUNCTIONAL ASSESSMENT: PAIN_FUNCTIONAL_ASSESSMENT: PREVENTS OR INTERFERES SOME ACTIVE ACTIVITIES AND ADLS

## 2022-04-16 ASSESSMENT — PAIN DESCRIPTION - FREQUENCY: FREQUENCY: CONTINUOUS

## 2022-04-16 ASSESSMENT — PAIN DESCRIPTION - PAIN TYPE
TYPE: ACUTE PAIN;SURGICAL PAIN
TYPE: SURGICAL PAIN
TYPE: SURGICAL PAIN

## 2022-04-16 ASSESSMENT — PAIN DESCRIPTION - ONSET: ONSET: ON-GOING

## 2022-04-16 ASSESSMENT — PAIN DESCRIPTION - LOCATION
LOCATION: NECK

## 2022-04-16 NOTE — PROGRESS NOTES
Physical Therapy    Facility/Department: [facility] ACUTE REHAB  Initial Assessment    NAME: Nica Morales  : 1960  MRN: [id number]    Date of Service: 2022    Discharge Recommendations:  Home with assist PRN,Patient would benefit from continued therapy after discharge   PT Equipment Recommendations  Other: Pt has rolling walker, rollator, manual wheelchair and lift chair at home    Assessment   Body structures, Functions, Activity limitations: Decreased functional mobility ; Decreased ADL status; Decreased strength;Decreased endurance;Decreased posture;Decreased balance;Decreased coordination; Increased pain  Assessment: Pt require mod A for bed mobility, max A for sit <> stand and stand weight shifts, Pt able to take 12-13 step RW, mod A +CGA of 2nd person. VC for foot placement and weight shifting, pt will continue to benefit from PT to progress activity and promote safety with gait and transfers  Treatment Diagnosis: B LE/B UE weakness, difficulty walking  Prognosis: Good  Decision Making: High Complexity  Barriers to Learning: none  REQUIRES PT FOLLOW UP: Yes  Activity Tolerance  Activity Tolerance: Patient limited by pain; Patient limited by fatigue;Patient limited by endurance       Patient Diagnosis(es): There were no encounter diagnoses. has a past medical history of Anxiety, Arthritis, At maximum risk for fall, Cancer ([facility] [address] Cauda equina syndrome (HCC), Cervical stenosis of spine, GERD (gastroesophageal reflux disease), History of stomach ulcers, Hypertension, Hypothyroidism, Lumbar neuritis, Post laminectomy syndrome, Spinal deformity, Thyroid disease, Uses wheelchair, Wears dentures, and Wellness examination. has a past surgical history that includes hernia repair (Bilateral); Breast surgery (Right); Mastectomy, radical; Hysterectomy, total abdominal; Lumbar spine surgery (N/A, 2021); back surgery;  Colonoscopy (about ); other surgical history (2022); cervical fusion (N/A, 4/12/2022); and cervical fusion (N/A, 4/12/2022). Restrictions  Restrictions/Precautions  Restrictions/Precautions: Fall Risk,General Precautions  Required Braces or Orthoses?: Yes  Implants present? :  (Lumbar/neck surgery)  Required Braces or Orthoses  Cervical: c-collar (collar on while out of bed ok to remove while resting in bed)  Position Activity Restriction  Other position/activity restrictions: ambulate pt; s/p C2-C7 fixation  Vision/Hearing  Vision: Impaired  Vision Exceptions: Wears glasses for reading (Glasses not worn during session)  Hearing: Within functional limits     Subjective  General  Chart Reviewed: Yes  Patient assessed for rehabilitation services?: Yes  Additional Pertinent Hx: Cyrus Rose is a 62 y.o. female with history of cauda equina syndrome s/p L2-S1 laminectomy (12/2021), HTN, hypothyroidism, GERD, breast cancer, and anxiety admitted to [facility] on 4/11/2022. She presented for planned surgical intervention for cervical stenosis. She underwent C5 corpectomy with C4-C6 arthrodesis, open reduction of cervical kyphotic deformity, and C2-C7 posterior fusion on 4/12/22 (Dr. Amira Parks). She has a cervical collar when out of bed. After her lumbar surgery in Dec 2021, patient had been improving initially with therapy, however overall has regressed. . Pt admitted to rehab unit on 4/15/22.   Family / Caregiver Present: No  Referral Date : 04/15/22  Diagnosis: Nontraumatic cervical spinal cord injury secondary to cervical stenosis s/p C5 corpectomy with C4-C6 arthrodesis, open reduction of cervical kyphotic deformity, and C2-C7 posterior fusion  Follows Commands: Within Functional Limits  Subjective  Subjective: Pt resting in bed upon arrival, agreeable to PT, despite high pain  Pain Screening  Patient Currently in Pain: Yes  Pain Assessment  Pain Assessment: 0-10  Pain Level: 8  Pain Type: Surgical pain  Pain Location: Neck  Pain Orientation: Posterior  Pain Descriptors:  (Hurting)  Pain Frequency: Continuous  Pain Onset: On-going  Functional Pain Assessment: Prevents or interferes some active activities and ADLs  Response to Pain Intervention: Patient Satisfied  Vital Signs  BP Location: Left upper arm  Level of Consciousness: Alert (0)  Patient Currently in Pain: Yes  Oxygen Therapy  O2 Device: None (Room air)  Pre Treatment Pain Screening  Intervention List: Patient able to continue with treatment    Orientation  Orientation  Overall Orientation Status: Within Functional Limits  Social/Functional History  Social/Functional History  Lives With: Significant other,Family (Partner Nat Plasencia) and 17 yo grandson Nikita Nicholson))  Type of Home: House  Home Layout: Two level,Performs ADL's on one level,Able to Live on Main level with bedroom/bathroom (\"we don't go upstairs\")  Home Access: Stairs to enter without rails  Entrance Stairs - Number of Steps: 1 curb step (pt receives help getting up steps)  Entrance Stairs - Rails: None  Bathroom Shower/Tub: Tub/Shower unit,Shower chair with back,Doors  Bathroom Toilet: Handicap height  Bathroom Equipment: Grab bars in shower,Tub transfer bench (Sink counter on the side of the toilet)  Bathroom Accessibility: Accessible  Home Equipment: [address] wheeled walker,Cane,Reacher,Lift chair (began using w/c last 2 weeks)  ADL Assistance: Independent (A with getting into shower; Increased A 2 weeks prior to sx)  Homemaking Assistance: Needs assistance (Share responsibilities prior;  Increased A 2 wks prior to sx)  Homemaking Responsibilities: Yes (Shares prior to decline; increased A prior to sx)  Meal Prep Responsibility: Secondary  Laundry Responsibility: Secondary  Cleaning Responsibility: Secondary  Shopping Responsibility: Secondary  Ambulation Assistance: Independent (IND prior to decline; Sitting on 4WW with SO pushing prior to sx)  Transfer Assistance: Independent (IND prior to decline; A from family over the last month)  Active : No  Patient's  Info: pt partner and grandson drive  Mode of Transportation: Car,Truck (Pt reports partner and grandson assist with vehicle transfers from w/c)  Occupation: On disability  Leisure & Hobbies: gardening, watching tv,\"did whatever made me happy. \"  IADL Comments: pt sleeps in recliner/lift chair at baseline  Additional Comments: Pt reports that she was independent after discharge from ARU up until about 1 month prior to sx with progressive decline requiring increased A from family. Pt reports needing max assist for all bathing, dressing, toileting, and self-care ADLs for approximately 2-4 weeks prior to sx; pt needed some assistance with ADLs ~2-4 weeks ago. Pt reports sponge-bathing in bathroom due to decline. Pt was using 4WW to sit and have partner push around priorto getting w/c and used w/c prior to sx for about 2 weeks around the house and in the community, receiving assist from partner and grandson for mobility and transfers; pt reports using RW >2-4 weeks prior to sx for func mob. Pt partner works full time days and grandson is currently in online school and is able to assist as needed. Cognition        Objective          AROM RLE (degrees)  RLE General AROM: AAROM WFL at ankle, AROM WFL Hip/knee, R foot inverts  AROM LLE (degrees)  LLE General AROM: AAROM WFL  Strength RLE  Comment: R Hip 3/5, Knee flexion/extension 3+ to 4-/5, DF 2+/5  Strength LLE  Comment: L Hip 2+/5, Knee flexion 2-/5, knee extension 2/5, DF 2/5  Strength RUE  Comment: See OT eval  Strength LUE  Comment: See OT eval.  Tone RLE  RLE Tone: Normotonic  Motor Control  Gross Motor?: WNL  Sensation  Overall Sensation Status: Impaired  Bed mobility  Bridging:  (not indicated at this time)  Scooting: Minimal assistance  Transfers  Sit to Stand:  Moderate Assistance  Stand to sit: Moderate Assistance  Bed to Chair: Maximum assistance  Comment: Cues for errect posture, decreased coordination noted B LE L LE> R LE.  Ambulation  Ambulation?: Yes  More Ambulation?: No  Ambulation 1  Surface: level tile  Device: Rolling Walker  Assistance: Maximum assistance; Moderate assistance  Quality of Gait: Decreased coordiantion noted at LE as well as trunk. Unsteady steps. Pt fearfull of steps  Gait Deviations: Slow Sharon;Decreased step length;Decreased arm swing  Distance: Pt took 4 steps for trasnfers bed>w/c. Ambulation 2  Surface - 2: level tile  Device 2: Rolling Walker  Other Apparatus 2: Wheelchair follow  Assistance 2: Moderate assistance;2 Person assistance;Contact guard assistance (2nd assist CGA on right side)  Quality of Gait 2: uncoordianted steps, unable to achieve Lknee terminal extesnion on stance phase, trunkal ataxia noted  too during gait. High fall risk. Distance: 12 to 13 steps  Comments: Pt veers to left , therapist assisting with controll/manuevering Rolling walker. Wheelchair Activities  Wheelchair Type: Recliner  Wheelchair Cushion: Standard  Pressure Relief Type: Lateral lean  Level of Assistance for pressure relief activities: Minimal assistance  Wheelchair Parts Management: No  Propulsion: Yes  Propulsion 1  Propulsion: Manual  Level: Level Tile  Method: RUE;LUE  Level of Assistance: Minimal assistance; Moderate assistance  Description/ Details: min A for straight path. mod A for wide turns. Distance: 60 ft, with 2 to 3 rest breaks. Balance  Posture: Fair  Sitting - Static: Good;-  Sitting - Dynamic: Fair  Standing - Static: Fair  Standing - Dynamic: Poor;+  Comments: Standing with rolling walker.   Other exercises  Other exercises?: Yes  Other exercises 1: Sit <>stnad x 5 reps mod/max A   Other exercises 2: Seated B LE ex's 10 reps, [place] T band-10 reps     Plan   Plan  Times per week: 1.5 hr/day, 5 to 7 days/week  Current Treatment Recommendations: Strengthening,ROM,Balance Training,Endurance Training,Functional Mobility Training,Transfer Training,ADL/Self-care Training,Stair training,Gait Training,Home Exercise Program,Safety Education & Training,Patient/Caregiver Education & Training,Equipment Evaluation, Education, & procurement,Neuromuscular Re-education  Safety Devices  Type of devices: All fall risk precautions in place,Bed alarm in place,Call light within reach,Gait belt,Patient at risk for falls,Left in bed,Nurse notified  Restraints  Initially in place: No    G-Code       OutComes Score                                                  AM-PAC Score             Goals  Short term goals  Time Frame for Short term goals: 10 days  Short term goal 1: Pt will perform bed mobility/rolling with SBA  Short term goal 2: Pt will perform transfers with Miryam  Short term goal 3: Pt will ambulate 50 to 100 ft, with rolling walker, Miryam. Short term goal 4: Pt will perform wheelchair mobility for 150 ft, SB stright path, min A for corners. Short term goal 5: Pt able to perform 3 to 5 steps with 2 rails, min/mod A  Long term goals  Time Frame for Long term goals : By DC  Long term goal 1: Pt able to perform sit<>stand transfers, mod-I  Long term goal 2: Pt able to perform pivot transfers at SBA/supervsion. Long term goal 3: Pt able to ambulate with rolling walker 100 to 150 ft , level surfaces, with rolling walker, CGA. Long term goal 4: Pt able to perform curb step with UE support at [address] term goal 5: Pt able to perform 5 to 12 steps with 2 rails at min A to improve LE strength/coordination  Long term goal 6: Pt able to propel w/c  distance of 150 ft SBA level surfaces  Long term goal 7: Pt able to ambulate on outside terraine/incline surface at min A with rolling walker  Long term goal 8: Pt able to ambulate distance of 60 to 70 ft for 2MWT to improve fucntion and gait speed.   Long term goal 5: Family training for safe functional mobility for DC home  Patient Goals   Patient goals : Be able to walk and do things for myself       Therapy Time       04/16/22 0800 04/16/22 1451   PT Individual Minutes Time In 0800 1451   Time Out 0902 1520   Minutes 62 29   Time Code Minutes   Timed Code Treatment Minutes 43 Minutes  --      Rosa Isela Sanders, PT

## 2022-04-16 NOTE — PROGRESS NOTES
Comprehensive Nutrition Assessment    Type and Reason for Visit:  Consult (Eval & treat)    Nutrition Recommendations/Plan: Continue diet as ordered. Nutrition Assessment:  Pt had elective surgery at [facility]. V's for anterior C5 corpectomy, posterior fixation from C-2-T1. Pt was having issues with left sided paraesis, severe lower extremity weakness being wheelchair bound for a month. Pt states \" my appetite isn't real good, constipation from opiate use for chronic pain. \" Pt states last BM was on Sunday and it was small, plans to ask her doctor for more meds to help with the constipation. Pt states weight is stable and doesn't want any nutritional supplements. Malnutrition Assessment:  Malnutrition Status: At risk for malnutrition (Comment)    Context:  Acute Illness     Findings of the 6 clinical characteristics of malnutrition:  Energy Intake:  Mild decrease in energy intake (Comment)  Weight Loss:  No significant weight loss     Body Fat Loss:  No significant body fat loss     Muscle Mass Loss:  No significant muscle mass loss    Fluid Accumulation:  1 - Mild Generalized (not nutrition related)   Strength:  Not Performed    Estimated Daily Nutrient Needs:  Energy (kcal):  3372-9118 kcals based on [place] Font with 1.2-1.3 factor using adm wt 64.4 kg; Weight Used for Energy Requirements:  Admission     Protein (g):  67-76 gm protein based on 1.4-1.6 gm/kg using IBW; Weight Used for Protein Requirements:  Ideal          Nutrition Related Findings:  Edema: +1 Generalized. Labs & meds reviewed. Hx of constipation from chronic opiate use. Wounds:  Surgical Incision       Current Nutrition Therapies:    ADULT DIET; Regular    Anthropometric Measures:  · Height: 5' 1\" (154.9 cm)  · Current Body Weight: 142 lb (64.4 kg)   · Admission Body Weight: 142 lb (64.4 kg)    · Ideal Body Weight: 105 lbs; % Ideal Body Weight 135.2 %   · BMI: 26.8  · BMI Categories: Overweight (BMI 25.0-29. 9)       Nutrition Diagnosis:   · Inadequate oral intake related to altered GI function as evidenced by constipation,intake 26-50%      Nutrition Interventions:   Food and/or Nutrient Delivery:  Continue Current Diet  Nutrition Education/Counseling:  No recommendation at this time   Coordination of Nutrition Care:  Continue to monitor while inpatient    Goals:  PO intake % of meals with good tolerance       Nutrition Monitoring and Evaluation:   Behavioral-Environmental Outcomes:  None Identified   Food/Nutrient Intake Outcomes:  Food and Nutrient Intake  Physical Signs/Symptoms Outcomes:  Biochemical Data,Constipation,Diarrhea,GI Status,Weight     Discharge Planning:    Continue current diet     Some areas of assessment may be incomplete due to COVID-19 precautions. Shirley Corcoran R.D., L.JAISON.   Clinical Dietitian  Office: [phone]

## 2022-04-16 NOTE — PROGRESS NOTES
[address] [facility]    ACUTE REHABILITATION OCCUPATIONAL THERAPY  DAILY NOTE    Date: 22  Patient Name: Kasandra Sweet      Room: 8966/2224-42    MRN: [id number]   : 1960  (62 y.o.)  Gender: female   Referring Practitioner: Richard Gusman MD  Diagnosis: Nontraumatic cervical spinal cord injury       Restrictions  Restrictions/Precautions: Fall Risk,General Precautions  Implants present? :  (Lumbar/neck surgery)  Other position/activity restrictions: ambulate pt; s/p C2-C7 fixation  Required Braces or Orthoses  Cervical: c-collar (collar on while out of bed ok to remove while resting in bed)  Required Braces or Orthoses?: Yes  Equipment Used: Bed,Wheelchair    Subjective  Subjective: Pt reports she is starting to regain feeling in her fingers   Comments: pleasant and cooperative for OT tx this PM  Patient Currently in Pain: Yes  Pain Level: 8  Pain Location: Neck  Pain Orientation: Anterior;Posterior  Restrictions/Precautions: Fall Risk;General Precautions  Overall Orientation Status: Within Functional Limits     Pain Assessment  Pain Assessment: 0-10  Pain Level: 8  Pain Type: Acute pain,Surgical pain  Pain Location: Neck  Pain Orientation: Anterior,Posterior    Objective                                  Left Hand Strength -  (lbs)  Handle Setting 2: 13.3# (13,13,14) (NORMS 35-57#)        Right Hand Strength -  (lbs)  Handle Setting 2: 35# (95,82,12) (NORMS 35-57#)        Fine Motor Skills  Left 9-Hole Peg Test: Impaired (NORMS 19-28s)  Left 9 Hole Peg Test Time (secs): 66  Right 9-Hole Peg Test: Impaired (NORMS 19-28s)  Right 9 Hole Peg Test Time (secs): 30.01                       ADL  Additional Comments: see AM OT noted for ADL scores          Assessment  Performance deficits / Impairments: Decreased ADL status; Decreased functional mobility ; Decreased ROM; Decreased strength;Decreased safe awareness;Decreased endurance;Decreased balance;Decreased high-level IADLs;Decreased fine motor control;Decreased coordination;Decreased posture  Prognosis: Good  Discharge Recommendations: Patient would benefit from continued therapy after discharge;Home with assist PRN  Activity Tolerance: Patient Tolerated treatment well  Safety Devices in place: Yes  Type of devices: All fall risk precautions in place;Call light within reach;Gait belt;Patient at risk for falls; Left in bed;Nurse notified  Restraints  Initially in place: No            Patient Goals   Patient goals : \"To get my body that way so that I can take care of things on my own. \"  Learner:patient  Method: demonstration and explanation       Outcome: demonstrated understanding        Plan  Plan  Times per week: 5-7  Times per day: Twice a day  Current Treatment Recommendations: Self-Care / ADL,Strengthening,Balance Training,Functional Mobility Training,Endurance [employer] Management,Safety Education & Training,Patient/Caregiver Education & Training,Equipment Evaluation, Education, & procurement,Home Management Training  Patient Goals   Patient goals : \"To get my body that way so that I can take care of things on my own. \"  Short term goals  Time Frame for Short term goals: By 1 week  Short term goal 1: Pt will complete lower body dressing/bathing with min A and Good safety with use of AE as needed  Short term goal 2: Pt will complete upper body dressing with CGA  and Good safety while maintaining cervical precautions  Short term goal 3: Pt will complete functional transfers during self care tasks with min A and Good safety  Short term goal 4: Pt will tolerate standing 4+ minutes during functional activity of choice with min A and Good safety  Short term goal 5: Pt will verbalize/demonstrate Good understanding of cervical precautions during self care tasks with increase safety and independence with daily activities  Short term goal 6: Pt will participate in 30+ mintues of therapeutic exercises/functional activities to increase safety and independence with self care and mobility  Long term goals  Time Frame for Long term goals : By discharge  Long term goal 1: Pt will complete BADLs with modified independence and Good safety while maintaining cervical precautions  Long term goal 2: Pt will complete functional transfers during self care tasks with modified independence with Good safety   Long term goal 3: Pt will tolerate standing 8+ mintues during functional activity of choice with Good safety  Long term goal 4: Pt will verbalize/demonstrate Good understanding of home safety/fall prevention strategies to increase safety and independence with self care and mobility  Long term goal 5: Pt will verbalize/demonstrate Good understanding of adaptive strategies/AE/DME to assisit with maintaining cervical precautions and increase safety and independence with self care and mobility  Long term goals 6: Pt will complete simple meal prep/light house keeping tasks with CGA and Good safety  Long term goal 7: OT to assess FM and  strength        04/16/22 1404   OT Individual Minutes   Time In 1404   Time Out 1431   Minutes 27     Electronically signed by SHERLYN Ying on 4/16/22 at 3:21 PM EDT

## 2022-04-16 NOTE — PLAN OF CARE
Problem: Skin Integrity:  Goal: Will show no infection signs and symptoms  Description: Will show no infection signs and symptoms  Outcome: Ongoing  Goal: Absence of new skin breakdown  Description: Absence of new skin breakdown  Outcome: Ongoing     Problem: Falls - Risk of:  Goal: Will remain free from falls  Description: Will remain free from falls  Outcome: Ongoing  Goal: Absence of physical injury  Description: Absence of physical injury  Outcome: Ongoing     Problem: Discharge Planning:  Goal: Discharged to appropriate level of care  Description: Discharged to appropriate level of care  Outcome: Ongoing     Problem: Pain:  Goal: Pain level will decrease  Description: Pain level will decrease  Outcome: Ongoing  Goal: Control of acute pain  Description: Control of acute pain  Outcome: Ongoing  Goal: Control of chronic pain  Description: Control of chronic pain  Outcome: Ongoing

## 2022-04-16 NOTE — H&P
Physical Medicine & Rehabilitation History and Physical  [facility]     Primary care provider: Bernice Campo MD     Chief Complaint and Reason for Rehabilitation Admission:   ADL and Mobility deficits secondary to cervical stenosis    History of Present Illness:  Nan Santos  is a 62 y.o. right-handed female admitted to the [address] [place] [address] on 4/15/2022. She was originally admitted to [facility] on 4/11/2022.       She initially presented for planned surgical intervention for cervical stenosis. She underwent C5 corpectomy with C4-C6 arthrodesis, open reduction of cervical kyphotic deformity, and C2-C7 posterior fusion on 4/12/22 (Dr. Ricky Stewart). She has a cervical collar when out of bed. She is currently requiring assistance for self-care activities and mobility prompting this admission. She reports aching post-surgical pain. She is known from recent admission after cauda equina surgery in January. Review of Systems:  CONSTITUTIONAL:  Denies fevers, chills, sweats or fatigue. EYES:  Denies diplopia, blind spots, blurring. HEENT:  Denies hearing loss, trouble chewing or swallowing. RESPIRATORY:  No wheezing, coughing, shortness of breath. CARDIOVASCULAR:  Denies chest pain, palpitations, lightheadedness. GASTROINTESTINAL:  Denies heartburn, nausea,diarrhea, abdominal pain. Denies recent BM  GENITOURINARY:  No urgency, frequency, incontinence, dysuria. ENDOCRINE:  Denies hot or cold intolerance. MUSCULOSKELETAL:  Denies focal joint pain. Reports back pain, neck pain. NEUROLOGICAL:  Denies focal numbness, tingling, balance loss, headache. BEHAVIOR/PSYCH:  Denies depression, anxiety, memory loss, insomnia. SKIN:  No ulcers, rash, bruises.       Premorbid function:  Requiring assistance and device    Current Function:  PT:  Restrictions/Precautions: Fall Risk,General Precautions  Implants present? :  (Lumbar/neck surgery)  Other position/activity restrictions: ambulate pt; s/p C2-C7 fixation  Required Braces or Orthoses  Cervical: c-collar (collar on while out of bed ok to remove while resting in bed)   Transfers  Sit to Stand: Moderate Assistance  Stand to sit: Moderate Assistance  Bed to Chair: Maximum assistance  Comment: Cues for errect posture, decreased coordination noted B LE L LE> R LE. Ambulation 1  Surface: level tile  Device: Rolling Walker  Assistance: Maximum assistance,Moderate assistance  Quality of Gait: Decreased coordiantion noted at LE as well as trunk. Unsteady steps. Pt fearfull of steps  Gait Deviations: Slow Sharon,Decreased step length,Decreased arm swing  Distance: Pt took 4 steps for [facility] bed>w/c. Transfers  Sit to Stand: Moderate Assistance  Stand to sit: Moderate Assistance  Bed to Chair: Maximum assistance  Comment: Cues for errect posture, decreased coordination noted B LE L LE> R LE. Ambulation  Ambulation?: Yes  More Ambulation?: No  Ambulation 1  Surface: level tile  Device: Rolling Walker  Assistance: Maximum assistance,Moderate assistance  Quality of Gait: Decreased coordiantion noted at LE as well as trunk. Unsteady steps. Pt fearfull of steps  Gait Deviations: Slow Sharon,Decreased step length,Decreased arm swing  Distance: Pt took 4 steps for [facility] bed>w/c. Surface: level tile  Ambulation 1  Surface: level tile  Device: Rolling Walker  Assistance: Maximum assistance,Moderate assistance  Quality of Gait: Decreased coordiantion noted at LE as well as trunk. Unsteady steps. Pt fearfull of steps  Gait Deviations: Slow Sharon,Decreased step length,Decreased arm swing  Distance: Pt took 4 steps for [facility] bed>w/c.       OT:   ADL  Grooming: Stand by assistance (Sitting in wheelchair at sink side)  UE Bathing: Stand by assistance (Sitting in wheelchait with vc for cervical precautions)  LE Bathing: Maximum assistance (A with LLE and max A while standing with A for bottom)  UE Dressing: Moderate assistance (A with collar, pulling over head and down back)  LE Dressing: Maximum assistance (Max A standing balance; A threading LLE and pulling over hip)  Toileting: Dependent/Total (Max A standing balance; total A for clothing mgmnt/hygiene)  Additional Comments: see AM OT noted for ADL scores         Balance  Sitting Balance: Minimal assistance (Min A - SBA; Slight LOB while sitting EOB)  Standing Balance: Maximum assistance (L side weakness with buckling)   Standing Balance  Time: 1 mintues x 4  Activity: functional transfers, lower body dressing/bathing, toileting  Comment: with 2 UE support. Therapist completed task while standing due to decerased strength and balance. LLE buckling while standing        Bed mobility  Bridging:  (not indicated at this time)  Sit to Supine: Moderate assistance (A with BLE )  Scooting: Minimal assistance  Comment: Pt returned to supine position at end of session. Pt sat EOB with slight LOB requriing min A to correct. A with bringing BLE into bed. Transfers  Sit to stand: Maximum assistance  Stand to sit: Maximum assistance  Transfer Comments: Verbal cues for hand placement and safety. L side weakness with LLE buckling   Toilet Transfers  Toilet - Technique: Stand pivot  Equipment Used: Grab bars  Toilet Transfer: Maximum assistance  Toilet Transfers Comments: Verbal cues for hand placement and safety. L side weakness with LLE buckling. Wheelchair Bed Transfers  Wheelchair/Bed - Technique: Stand pivot  Equipment Used: Bed,Wheelchair  Level of Asssistance: Maximum assistance  Wheelchair Transfers Comments: Verbal cues for hand placement and safety. L side weakness with LLE buckling.      SPEECH:      Past Medical History:      Diagnosis Date    Anxiety     Arthritis     At maximum risk for fall 12/29/2021    caudal equina syndrome and cervical stenosis    Cancer [facility])     right breast    Cauda equina syndrome ([facility] [address] 12/29/2021    neuropathy, mobility difficulty, incontinance    Cervical stenosis of spine 12/29/2021    GERD (gastroesophageal reflux disease)     History of stomach ulcers     Hypertension     Dr. Augusto Tran    Hypothyroidism     Lumbar neuritis     Post laminectomy syndrome     Spinal deformity 11/2021    cervical and lumbar    Thyroid disease     Uses wheelchair     Wears dentures     Wellness examination     Dr. Augusto Tran       Past Surgical History:      Procedure Laterality Date    BACK SURGERY      lower back x 3, cervical- x 1: C4- C6, 11/4/2014     BREAST SURGERY Right     mastectomy with reconstruction    CERVICAL FUSION N/A 4/12/2022    C5 CORPECTOMY, USE OF INTRAOPERATIVE CERVICAL TRACTION performed by Larissa Ventura DO at [address] 4/12/2022    POSTERIOR FIXATION C2-C7 performed by Larissa Ventura DO at [address]  about 2018    HERNIA REPAIR Bilateral     inguinal    HYSTERECTOMY, TOTAL ABDOMINAL      LUMBAR SPINE SURGERY N/A 12/30/2021    LUMBAR LAMINECTOMY L2-S1 performed by Larissa [facility] DO at [facility]      OTHER SURGICAL HISTORY  04/12/2022    C5 CORPECTOMY, USE OF INTRAOPERATIVE CERVICAL TRACTION (N/A Spine Cervical)        Allergies:    Latex and Kiwi extract    Medications   Scheduled Meds:   lisinopril  5 mg Oral Daily    polyethylene glycol  17 g Oral Daily    acetaminophen  650 mg Oral Q6H    busPIRone  30 mg Oral BID    dilTIAZem  180 mg Oral Daily    docusate sodium  200 mg Oral BID    enoxaparin  40 mg SubCUTAneous Daily    ferrous sulfate  325 mg Oral BID WC    levothyroxine  88 mcg Oral Daily    methadone  10 mg Oral BID    neomycin-bacitracin-polymyxin   Topical BID    pantoprazole  40 mg Oral Daily    [START ON 4/20/2022] Vitamin D  5,000 Units Oral Weekly     Continuous Infusions:  PRN Meds:.bisacodyl, senna, magnesium hydroxide, ondansetron **OR** [DISCONTINUED] ondansetron, oxyCODONE **OR** oxyCODONE, methocarbamol     Social History:  Lives With: Significant other,Family (Partner (Emmett) and 17 yo grandson Bal Rosario))  Type of Home: House  Home Layout: Two level,Performs ADL's on one level,Able to Live on Main level with bedroom/bathroom (\"we don't go upstairs\")  Home Access: Stairs to enter without rails  Entrance Stairs - Number of Steps: 1 curb step (pt receives help getting up steps)  Entrance Stairs - Rails: None  Bathroom Shower/Tub: Tub/Shower unit,Shower chair with back  Bathroom Toilet: Handicap height  Bathroom Equipment: Grab bars in shower  Home Equipment: [address] wheeled walker (began using w/c last 2 weeks)  ADL Assistance: Needs assistance (Partner and grandson assist with bathing, dressing, grooming, toileting)  Homemaking Assistance: Needs assistance  Homemaking Responsibilities: No (pt significant other and grandson perform)  Meal Prep Responsibility: Secondary  Laundry Responsibility: Secondary  Cleaning Responsibility: Secondary  Shopping Responsibility: Secondary  Ambulation Assistance: Needs assistance (wheelchair)  Transfer Assistance: Needs assistance (pt grandson and partner assist)  Active : No  Patient's  Info: pt partner and grandson drive  Mode of Transportation: Car,Truck (Pt reports partner and grandson assist with vehicle transfers from w/c)  Occupation: On disability  Leisure & Hobbies: watching television  IADL Comments: pt sleeps in recliner at baseline  Additional Comments: Pt reports needing max assist for all bathing, dressing, toileting, and self-care ADLs for approximately 2 weeks prior to admission; pt needed some assistance with ADLs >2 weeks ago. Pt reports sponge-bathing in bathroom at baseline, and sleeping in a recliner chair on the first floor at baseline.  Pt now uses w/c for all func mob in community and around house for ADL copmletion, receiving assist from partner and grandson for mobility and transfers; pt reports using RW and standard walker >2 weeks ago for func mob. Pt partner works full time days and grandson is currently in online school. Social History     Socioeconomic History    Marital status: Single     Spouse name: Not on file    Number of children: Not on file    Years of education: Not on file    Highest education level: Not on file   Occupational History    Not on file   Tobacco Use    Smoking status: Former Smoker     Packs/day: 0.50     Years: 30.00     Pack years: 15.00     Quit date:      Years since quittin.2    Smokeless tobacco: Never Used   Vaping Use    Vaping Use: Every day    Substances: Nicotine   Substance and Sexual Activity    Alcohol use: Yes     Alcohol/week: 0.0 standard drinks     Comment: weekend at times    Drug use: No    Sexual activity: Not on file   Other Topics Concern    Not on file   Social History Narrative    Not on file     Social Determinants of Health     Financial Resource Strain:     Difficulty of Paying Living Expenses: Not on file   Food Insecurity:     Worried About Running Out of Food in the Last Year: Not on file    Rodriguez of Food in the Last Year: Not on file   Transportation Needs:     Lack of Transportation (Medical): Not on file    Lack of Transportation (Non-Medical):  Not on file   Physical Activity: Inactive    Days of Exercise per Week: 0 days    Minutes of Exercise per Session: 0 min   Stress:     Feeling of Stress : Not on file   Social Connections:     Frequency of Communication with Friends and Family: Not on file    Frequency of Social Gatherings with Friends and Family: Not on file    Attends Restorationism Services: Not on file    Active Member of Clubs or Organizations: Not on file    Attends Club or Organization Meetings: Not on file    Marital Status: Not on file   Intimate Partner Violence:     Fear of Current or Ex-Partner: Not on file    Emotionally Abused: Not on file    Physically Abused: Not on file    Sexually Abused: Not on file   Housing Stability:  Unable to Pay for Housing in the Last Year: Not on file    Number of Places Lived in the Last Year: Not on file    Unstable Housing in the Last Year: Not on file       Family History:       Problem Relation Age of Onset    Hypertension Mother     Heart Disease Mother     Cancer Mother        Diagnostics:     CBC:   Recent Labs     04/14/22  1142 04/15/22  0534 04/16/22  0730   WBC  --  7.3 8.6   RBC  --  3.19* 3.34*   HGB 8.6* 9.0* 9.4*   HCT 27.3* 29.1* 28.9*   MCV  --  91.2 86.6   RDW  --  17.2* 18.4*   PLT  --  213 295     BMP:    Recent Labs     04/14/22  0827 04/15/22  0534 04/16/22  0730   GLUCOSE 101* 93 95   BUN 10 9 8   CREATININE 0.55 0.54 0.54   CALCIUM 8.6 8.1* 8.9   * 132* 133*   K 3.4* 3.2* 3.8   CL 94* 100 97*   CO2 24 22 24   ANIONGAP 12 10 12   LABGLOM >60 >60 >60   GFRAA >60 >60 >60   GFR                    HbA1c: No results found for: LABA1C  BNP: No results for input(s): BNP in the last 72 hours. PT/INR: No results for input(s): PROTIME, INR in the last 72 hours. APTT: No results for input(s): APTT in the last 72 hours. CARDIAC ENZYMES:   Recent Labs     04/14/22  1142 04/14/22  1500   TROPHS 35* 26*      FASTING LIPID PANEL:  Lab Results   Component Value Date    CHOL 198 07/23/2020     (A) 07/23/2020    TRIG 96 07/23/2020     LIVER PROFILE: No results for input(s): AST, ALT, ALB, BILIDIR, BILITOT, ALKPHOS in the last 72 hours. Radiology:    XR CERVICAL SPINE (2-3 VIEWS)   Final Result   Expected postoperative findings status post anterior and posterior cervical   fusion.           FLUORO FOR SURGICAL PROCEDURES   Final Result       FLUORO FOR SURGICAL PROCEDURES   Final Result       XR CERVICAL SPINE FLEXION AND EXTENSION   Final Result   1. Retrolisthesis of C2 on C3 measures 2 mm on extension view and reduces on   flexion view. 2. No evidence of instability of anterolisthesis of C3 on C4 measuring 1.5 mm.   3. No acute fracture on limited views.   Of note, the C6-C7 and C7-T1 levels   are not well seen due to overlying soft tissues. 4. Severe multilevel degenerative changes.           XR CHEST PORTABLE   Final Result   No acute cardiopulmonary disease.           CTA NECK W CONTRAST   Final Result   Unremarkable CTA of the neck.       Minor hazy pleural thickening and chronic-appearing medial right upper lobe   atelectasis/scarring. If there is any clinical concern, follow-up   noncontrast chest CT may be useful. Physical Exam:  BP (!) 160/80   Pulse 95   Temp 98 °F (36.7 °C)   Resp 19   Ht 5' 1\" (1.549 m)   SpO2 97%   BMI 26.83 kg/m²     GEN: Well developed, well nourished, in NAD  HEENT:  NCAT. PERRL. EOMI. Mucous membranes pink and moist. Hard cervical collar in place  PULM:  Clear to ausculation. No rales or rhonchi. Respirations WNL and unlabored. CV:  Regular rate rhythm. No murmurs or gallops. GI:  Abdomen soft. Tender, distended. BS + and hypoactive. NEUROLOGICAL: A&O x3. Sensation intact to light touch. DTRs 2+. MSK:  Functional ROM BUE and BLEs. Motor testing 4/5 key muscles BUEs, 3/5 B hip flexion, 3/5 R knee extension, 3-/5 L knee extension, 3-/5 B dorsiflexion . SKIN: Warm dry and intact. Good turgor. EXTREMITIES:  No calf tenderness to palpation. No edema BLEs. PSYCH: Mood WNL. Appropriately interactive. Affect WNL. Principal Diagnosis/plan:  The patient is a 62y.o. year old with ADL and Mobility deficits secondary to nontraumatic cervical spinal cord injury secondary to stenosis    She will require close medical monitoring for the comorbidities listed below. She will benefit from intensive interdisciplinary therapies and rehab nursing care and is appropriate for inpatient rehabilitation. The post admission physician evaluation (NANETTE) is consistent with the pre-admission assessment. See above findings to reflect the elements required in the NANETTE.   Patient's admitting condition is consistent with the findings of the preadmission assessment by the rehabilitation admissions coordinator. Diagnoses/plan:    1. Nontraumatic cervical spinal cord injury secondary to cervical stenosis: s/p C5 corpectomy, C4-6 arthrodesis, open reduction cervical kyphosis, C2-7 posterior fusion performed by Dr. Carri Harris on 4/12/2022. PT/OT for gait, mobility, strengthening, endurance, ADLs, and self care. Has Tylenol prn, methadone BID, robaxin prn, oxycodone prn for pain  2. Hx cauda equina syndrome: s/p L2-S1 laminectomy (December 2021)  3. HTN: on Diltiazem. Admitted with prn IV labetalol - will discontinue  4. Anemia: Hb low but improving. On ferrous sulfate. Monitoring. 5. Hypothyroidism: on levothyroxine. 6. Hyponatremia: Na 133 today. Monitoring. Defer medical management to IM  7. GERD: on pantoprazole. 8. Anxiety:on buspar. 9. Vitamin D deficiency: on repletion weekly  10. Hx breast cancer  11. Bowel Management: Miralax daily, senokot prn, dulcolax prn. Has milk of magnesia prn  12. DVT Prophylaxis:  low molecular weight heparin, SCD's while in bed and HERMINIO's   13. Internal medicine for medical management      Estimated Length of Stay:  2 weeks. Prognosis  fair    Goals    Home at Supervision  Supervision at Discharge: Kenneth Moseley MD     This note is created with the assistance of a speech recognition program.  While intending to generate a document that actually reflects the content of the visit, the document can still have some errors including those of syntax and sound a like substitutions which may escape proof reading. In such instances, actual meaning can be extrapolated by contextual diversion.

## 2022-04-16 NOTE — PLAN OF CARE
Problem: Skin Integrity:  Goal: Will show no infection signs and symptoms  Description: Will show no infection signs and symptoms  4/16/2022 1903 by Ulla Bosworth II, LPN  Outcome: Ongoing  4/16/2022 0602 by Pacheco Underwood RN  Outcome: Ongoing  Goal: Absence of new skin breakdown  Description: Absence of new skin breakdown  4/16/2022 1903 by Ulla Bosworth II, LPN  Outcome: Ongoing  4/16/2022 0602 by Pacheco Underwood RN  Outcome: Ongoing     Problem: Falls - Risk of:  Goal: Will remain free from falls  Description: Will remain free from falls  4/16/2022 1903 by Efrain Watts LPN  Outcome: Ongoing  4/16/2022 0602 by Pacheco Underwood RN  Outcome: Ongoing  Goal: Absence of physical injury  Description: Absence of physical injury  4/16/2022 1903 by Ulla Bosworth II, LPN  Outcome: Ongoing  4/16/2022 0602 by Pacheco Underwood RN  Outcome: Ongoing     Problem: Discharge Planning:  Goal: Discharged to appropriate level of care  Description: Discharged to appropriate level of care  4/16/2022 1903 by Efrain Watts LPN  Outcome: Ongoing  4/16/2022 0602 by Pacheco Underwood RN  Outcome: Ongoing     Problem: Pain:  Goal: Pain level will decrease  Description: Pain level will decrease  4/16/2022 1903 by Efrain Watts LPN  Outcome: Ongoing  4/16/2022 0602 by Bhavna Cooper RN  Outcome: Ongoing  Goal: Control of acute pain  Description: Control of acute pain  4/16/2022 1903 by Efrain Watts LPN  Outcome: Ongoing  4/16/2022 0602 by Bhavna Cooper RN  Outcome: Ongoing  Goal: Control of chronic pain  Description: Control of chronic pain  4/16/2022 1903 by Efrain Watts LPN  Outcome: Ongoing  4/16/2022 0602 by Pacheco Underwood RN  Outcome: Ongoing     Problem: Nutrition  Goal: Optimal nutrition therapy  Outcome: Ongoing     Problem: Musculor/Skeletal Functional Status  Goal: Highest potential functional level  Outcome: Ongoing  Goal: Absence of falls  Outcome: Ongoing

## 2022-04-16 NOTE — PROGRESS NOTES
[address] [facility] [address]   Acute Rehabilitation OT Evaluation  Date: 22  Patient Name: Santi Plok       Room: 6694/7949-42  MRN: [id number]  Account: [de-identified]   : 1960  (62 y.o.) Gender: female     Referring Practitioner: Michael Montes MD  Diagnosis: Nontraumatic cervical spinal cord injury       Treatment Diagnosis: Impaired self care status   Past Medical History:  has a past medical history of Anxiety, Arthritis, At maximum risk for fall, Cancer ([address] Cauda equina syndrome (HCC), Cervical stenosis of spine, GERD (gastroesophageal reflux disease), History of stomach ulcers, Hypertension, Hypothyroidism, Lumbar neuritis, Post laminectomy syndrome, Spinal deformity, Thyroid disease, Uses wheelchair, Wears dentures, and Wellness examination. Past Surgical History:   has a past surgical history that includes hernia repair (Bilateral); Breast surgery (Right); Mastectomy, radical; Hysterectomy, total abdominal; Lumbar spine surgery (N/A, 2021); back surgery; Colonoscopy (about ); other surgical history (2022); cervical fusion (N/A, 2022); and cervical fusion (N/A, 2022). Restrictions  Restrictions/Precautions: Fall Risk,General Precautions  Implants present? :  (Lumbar/Neck surgery)  Other position/activity restrictions: ambulate pt; s/p C2-C7 fixation  Required Braces or Orthoses  Cervical: c-collar (aspen collar on while out of bed ok to remove while resting in bed and eating)  Required Braces or Orthoses?: Yes  Equipment Used: Bed,Wheelchair    Vitals  Temp: 98 °F (36.7 °C)  Pulse: 95  Resp: 19  BP: (!) 160/80  Height: 5' 1\" (154.9 cm)  Oxygen Therapy  SpO2: 97 %  O2 Device: None (Room air)  Level of Consciousness: Alert (0)    Subjective  Subjective: \"To get my body that way so that I can take care of things on my own. \" Pt stated in regards to ARU goals. Comments: OK per LPN Amanda Wong for washing up at sink with C-collar donned.  Nursing reaching out to surgery to see if pt is allowed to shower with C-collar on. Pain Level: 7  Pain Location: Neck  Orientation  Overall Orientation Status: Within Functional Limits  Vision  Vision: Impaired  Vision Exceptions: Wears glasses for reading  Hearing  Hearing: Within functional limits  Social/Functional History  Lives With: Significant other,Family (Partner Brittni Rivera) and 15 yo grandson Janine Bazan))  Type of Home: House  Home Layout: Two level,Performs ADL's on one level,Able to Live on Main level with bedroom/bathroom (\"we don't go upstairs\")  Home Access: Stairs to enter without rails  Entrance Stairs - Number of Steps: 1 curb step (pt receives help getting up steps)  Entrance Stairs - Rails: None  Bathroom Shower/Tub: Tub/Shower unit,Shower chair with back,Doors  Bathroom Toilet: Handicap height  Bathroom Equipment: Grab bars in shower,Tub transfer bench (Sink counter on the side of the toilet)  Bathroom Accessibility: Accessible  Home Equipment: [address] wheeled walker,Cane,Reacher,Lift chair (began using w/c last 2 weeks)  ADL Assistance: Independent (A with getting into shower; Increased A 2 weeks prior to sx)  Homemaking Assistance: Needs assistance (Share responsibilities prior; Increased A 2 wks prior to sx)  Homemaking Responsibilities: Yes (Heber Haider prior to decline; increased A prior to sx)  Ambulation Assistance: Independent (IND prior to decline; Sitting on 4WW with SO pushing prior to sx)  Transfer Assistance: Independent (IND prior to decline; A from family over the last month)  Active : No  Patient's  Info: pt partner and grandson drive  Mode of Transportation: [employer] (Pt reports partner and grandson assist with vehicle transfers from w/c)  Occupation: On disability  Leisure & Hobbies: gardening, watching tv,\"did whatever made me happy. \"  IADL Comments: pt sleeps in recliner/lift chair at baseline  Additional Comments: Pt reports that she was independent after discharge from ARU up until about 1 month prior to sx with progressive decline requiring increased A from family. Pt reports needing max assist for all bathing, dressing, toileting, and self-care ADLs for approximately 2-4 weeks prior to sx; pt needed some assistance with ADLs ~2-4 weeks ago. Pt reports sponge-bathing in bathroom due to decline. Pt was using 4WW to sit and have partner push around priorto getting w/c and used w/c prior to sx for about 2 weeks around the house and in the community, receiving assist from partner and grandson for mobility and transfers; pt reports using RW >2-4 weeks prior to sx for func mob. Pt partner works full time days and grandson is currently in online school and is able to assist as needed.    Pain Assessment  Pain Assessment: 0-10  Pain Level: 7  Pain Type: Surgical pain  Pain Location: Neck    Objective      Cognition  Overall Cognitive Status: WFL   Perception  Overall Perceptual Status: WFL  Sensation  Overall Sensation Status: Impaired (Bilateral hand and feet; pt reports improvement in sensation)     UE Function           LUE Strength  L Shoulder Flex: NT  L Elbow Flex: 3+/5  L Elbow Ext: 3+/5  L Hand General: 3+/5  LUE Strength Comment: Shoulder NT due to cervical precautions; otherwise 3+/5     LUE Tone: Normotonic     LUE AROM (degrees)  LUE AROM : WFL  LUE General AROM: within cervical precautions     Left Hand AROM (degrees)  Left Hand AROM: WFL  RUE Strength  R Shoulder Flex: NT  R Elbow Flex: 4/5  R Elbow Ext: 4/5  R Hand General: 4/5  RUE Strength Comment: Shoulder NT due to cervical precautions; otherwise 4/5      RUE Tone: Normotonic     RUE AROM (degrees)  RUE AROM : WFL  RUE General AROM: within cervical precautions     Right Hand AROM (degrees)  Right Hand AROM: WFL                        Fine Motor Skills  Coordination  Movements Are Fluid And Coordinated: No  Coordination and Movement description: Fine motor impairments,Gross motor impairments,Left UE Mobility  Sit to Supine: Moderate assistance (A with BLE )     Balance  Sitting Balance: Minimal assistance (Min A - SBA; Slight LOB while sitting EOB)  Standing Balance: Maximum assistance (L side weakness with buckling)  Standing Balance  Time: 1 mintues x 4  Activity: functional transfers, lower body dressing/bathing, toileting  Comment: with 2 UE support. Therapist completed task while standing due to decerased strength and balance. LLE buckling while standing     Bed mobility  Sit to Supine: Moderate assistance (A with BLE )  Comment: Pt returned to supine position at end of session. Pt sat EOB with slight LOB requriing min A to correct. A with bringing BLE into bed. Transfers  Sit to stand: Maximum assistance  Stand to sit: Maximum assistance  Transfer Comments: Verbal cues for hand placement and safety. L side weakness with LLE buckling  Toilet Transfers  Toilet - Technique: Stand pivot  Equipment Used: Grab bars  Toilet Transfer: Maximum assistance  Toilet Transfers Comments: Verbal cues for hand placement and safety. L side weakness with LLE buckling. Wheelchair Bed Transfers  Wheelchair/Bed - Technique: Stand pivot  Equipment Used: Bed,Wheelchair  Level of Asssistance: Maximum assistance  Wheelchair Transfers Comments: Verbal cues for hand placement and safety. L side weakness with LLE buckling. Functional Activity Tolerance  Functional Activity Tolerance: Tolerates 30 min exercise with multiple rests   Activity Tolerance: Patient limited by pain,Patient limited by fatigue,Patient Tolerated treatment well         ADL     ADL  Grooming: Stand by assistance (Sitting in wheelchair at sink side)  UE Bathing: Stand by assistance (Sitting in wheelchait with vc for cervical precautions)  LE Bathing: Maximum assistance (A with LLE and max A while standing with A for bottom)  UE Dressing:  Moderate assistance (A with collar, pulling over head and down back)  LE Dressing: Maximum assistance (Max A standing balance; A threading LLE and pulling over hip)  Toileting: Dependent/Total (Max A standing balance; total A for clothing mgmnt/hygiene)  Additional Comments: OT facilitated pts engagement in self care tasks while sitting in wheelchair at sink side with c-collar donned. Nursing looking into if okay with surgery to shower with c-collar donned. Verbal cues throughout to maintain cervial precautions during self care tasks. Increased weakness in L side of body with increased time/A to complete. Pt required max A for transfers and standing balance during all self care tasks. Pt currently limited due to decreased strength, balance, activity tolerance, cervical precautions, and pain limiting safety with self care and mobility    OT scores     Eating  Discharge Goal: Independent  Oral Hygiene  Assistance Needed: Supervision or touching assistance  CARE Score: 4  Discharge Goal: Independent  Toileting Hygiene  Assistance Needed: Dependent  CARE Score: 1  Discharge Goal: Independent  Shower/Bathe Self  Assistance Needed: Substantial/maximal assistance  CARE Score: 2  Discharge Goal: Independent  Upper Body Dressing  Assistance Needed: Partial/moderate assistance  CARE Score: 3  Discharge Goal: Independent  Lower Body Dressing  Assistance Needed: Substantial/maximal assistance  CARE Score: 2  Discharge Goal: Independent  Putting On/Taking Off Footwear  Assistance Needed: Dependent  CARE Score: 1  Discharge Goal: Independent  Toilet Transfer  Assistance Needed: Substantial/maximal assistance  CARE Score: 2  Discharge Goal: Independent    Goals  Patient Goals   Patient goals : \"To get my body that way so that I can take care of things on my own. \"  Short term goals  Time Frame for Short term goals: By 1 week  Short term goal 1: Pt will complete lower body dressing/bathing with min A and Good safety with use of AE as needed  Short term goal 2: Pt will complete upper body dressing with CGA  and Good safety while maintaining cervical precautions  Short term goal 3: Pt will complete functional transfers during self care tasks with min A and Good safety  Short term goal 4: Pt will tolerate standing 4+ minutes during functional activity of choice with min A and Good safety  Short term goal 5: Pt will verbalize/demonstrate Good understanding of cervical precautions during self care tasks with increase safety and independence with daily activities  Short term goal 6: Pt will participate in 30+ mintues of therapeutic exercises/functional activities to increase safety and independence with self care and mobility  Long term goals  Time Frame for Long term goals : By discharge  Long term goal 1: Pt will complete BADLs with modified independence and Good safety while maintaining cervical precautions  Long term goal 2: Pt will complete functional transfers during self care tasks with modified independence with Good safety   Long term goal 3: Pt will tolerate standing 8+ mintues during functional activity of choice with Good safety  Long term goal 4: Pt will verbalize/demonstrate Good understanding of home safety/fall prevention strategies to increase safety and independence with self care and mobility  Long term goal 5: Pt will verbalize/demonstrate Good understanding of adaptive strategies/AE/DME to assisit with maintaining cervical precautions and increase safety and independence with self care and mobility  Long term goals 6: Pt will complete simple meal prep/light house keeping tasks with CGA and Good safety  Long term goal 7: OT to assess FM and  strength    Assessment  Performance deficits / Impairments: Decreased ADL status,Decreased functional mobility ,Decreased ROM,Decreased strength,Decreased safe awareness,Decreased endurance,Decreased balance,Decreased high-level IADLs,Decreased fine motor control,Decreased coordination,Decreased posture  Treatment Diagnosis: Impaired self care status  Prognosis: Good  Decision Making: High Complexity  REQUIRES OT FOLLOW UP: Yes  Discharge Recommendations: Patient would benefit from continued therapy after discharge,Home with assist PRN  Plan  Times per week: 5-7  Times per day: Twice a day  Current Treatment Recommendations: Self-Care / ADL,Strengthening,Balance Training,Functional Mobility Training,Endurance Training,Pain Management,Safety Education & Training,Patient/Caregiver Education & Training,Equipment Evaluation, Education, & procurement,Home Management Training       Equipment Recommendations  Equipment Needed:  (TBD)     04/16/22 0910   OT Individual Minutes   Time In 8106   Time Out 1013   Minutes 63   Time Code Minutes    Timed Code Treatment Minutes 48 Minutes     Electronically signed by Zechariah Krishna OT on 4/16/22 at 1:09 PM EDT

## 2022-04-17 PROCEDURE — 6370000000 HC RX 637 (ALT 250 FOR IP): Performed by: REGISTERED NURSE

## 2022-04-17 PROCEDURE — 97116 GAIT TRAINING THERAPY: CPT

## 2022-04-17 PROCEDURE — 99232 SBSQ HOSP IP/OBS MODERATE 35: CPT | Performed by: PHYSICAL MEDICINE & REHABILITATION

## 2022-04-17 PROCEDURE — 97530 THERAPEUTIC ACTIVITIES: CPT

## 2022-04-17 PROCEDURE — 6370000000 HC RX 637 (ALT 250 FOR IP): Performed by: PHYSICAL MEDICINE & REHABILITATION

## 2022-04-17 PROCEDURE — 1180000000 HC REHAB R&B

## 2022-04-17 PROCEDURE — 97110 THERAPEUTIC EXERCISES: CPT

## 2022-04-17 PROCEDURE — 6370000000 HC RX 637 (ALT 250 FOR IP): Performed by: INTERNAL MEDICINE

## 2022-04-17 PROCEDURE — 97535 SELF CARE MNGMENT TRAINING: CPT

## 2022-04-17 PROCEDURE — 6360000002 HC RX W HCPCS: Performed by: REGISTERED NURSE

## 2022-04-17 PROCEDURE — 99232 SBSQ HOSP IP/OBS MODERATE 35: CPT | Performed by: INTERNAL MEDICINE

## 2022-04-17 RX ORDER — BACLOFEN 10 MG/1
10 TABLET ORAL 3 TIMES DAILY
Status: DISCONTINUED | OUTPATIENT
Start: 2022-04-17 | End: 2022-04-18

## 2022-04-17 RX ADMIN — DOCUSATE SODIUM 200 MG: 100 CAPSULE, LIQUID FILLED ORAL at 08:10

## 2022-04-17 RX ADMIN — LISINOPRIL 5 MG: 5 TABLET ORAL at 08:10

## 2022-04-17 RX ADMIN — ACETAMINOPHEN 650 MG: 325 TABLET ORAL at 22:21

## 2022-04-17 RX ADMIN — POLYMYXIN B SULFATE, BACITRACIN ZINC, NEOMYCIN SULFATE: 5000; 3.5; 4 OINTMENT TOPICAL at 09:07

## 2022-04-17 RX ADMIN — METHADONE HYDROCHLORIDE 10 MG: 10 TABLET ORAL at 09:08

## 2022-04-17 RX ADMIN — OXYCODONE HYDROCHLORIDE 10 MG: 10 TABLET ORAL at 05:37

## 2022-04-17 RX ADMIN — ENOXAPARIN SODIUM 40 MG: 100 INJECTION SUBCUTANEOUS at 09:07

## 2022-04-17 RX ADMIN — BUSPIRONE HYDROCHLORIDE 30 MG: 15 TABLET ORAL at 20:19

## 2022-04-17 RX ADMIN — ACETAMINOPHEN 650 MG: 325 TABLET ORAL at 17:06

## 2022-04-17 RX ADMIN — POLYMYXIN B SULFATE, BACITRACIN ZINC, NEOMYCIN SULFATE: 5000; 3.5; 4 OINTMENT TOPICAL at 20:19

## 2022-04-17 RX ADMIN — LEVOTHYROXINE SODIUM 88 MCG: 0.09 TABLET ORAL at 05:37

## 2022-04-17 RX ADMIN — FERROUS SULFATE TAB 325 MG (65 MG ELEMENTAL FE) 325 MG: 325 (65 FE) TAB at 08:04

## 2022-04-17 RX ADMIN — BACLOFEN 10 MG: 10 TABLET ORAL at 20:18

## 2022-04-17 RX ADMIN — ACETAMINOPHEN 650 MG: 325 TABLET ORAL at 11:40

## 2022-04-17 RX ADMIN — POLYETHYLENE GLYCOL 3350 17 G: 17 POWDER, FOR SOLUTION ORAL at 08:10

## 2022-04-17 RX ADMIN — DILTIAZEM HYDROCHLORIDE 180 MG: 180 CAPSULE, COATED, EXTENDED RELEASE ORAL at 09:08

## 2022-04-17 RX ADMIN — BACLOFEN 10 MG: 10 TABLET ORAL at 14:42

## 2022-04-17 RX ADMIN — OXYCODONE HYDROCHLORIDE 10 MG: 10 TABLET ORAL at 20:22

## 2022-04-17 RX ADMIN — PANTOPRAZOLE SODIUM 40 MG: 40 TABLET, DELAYED RELEASE ORAL at 08:10

## 2022-04-17 RX ADMIN — ACETAMINOPHEN 650 MG: 325 TABLET ORAL at 05:37

## 2022-04-17 RX ADMIN — SENNOSIDES 17.2 MG: 8.6 TABLET, FILM COATED ORAL at 16:18

## 2022-04-17 RX ADMIN — BUSPIRONE HYDROCHLORIDE 30 MG: 15 TABLET ORAL at 09:08

## 2022-04-17 RX ADMIN — FERROUS SULFATE TAB 325 MG (65 MG ELEMENTAL FE) 325 MG: 325 (65 FE) TAB at 17:05

## 2022-04-17 RX ADMIN — DOCUSATE SODIUM 200 MG: 100 CAPSULE, LIQUID FILLED ORAL at 20:18

## 2022-04-17 RX ADMIN — METHADONE HYDROCHLORIDE 10 MG: 10 TABLET ORAL at 20:18

## 2022-04-17 RX ADMIN — OXYCODONE HYDROCHLORIDE 10 MG: 10 TABLET ORAL at 14:57

## 2022-04-17 ASSESSMENT — PAIN SCALES - GENERAL
PAINLEVEL_OUTOF10: 7
PAINLEVEL_OUTOF10: 9
PAINLEVEL_OUTOF10: 9
PAINLEVEL_OUTOF10: 8
PAINLEVEL_OUTOF10: 9
PAINLEVEL_OUTOF10: 7
PAINLEVEL_OUTOF10: 7
PAINLEVEL_OUTOF10: 8

## 2022-04-17 ASSESSMENT — PAIN DESCRIPTION - LOCATION
LOCATION: NECK
LOCATION: NECK

## 2022-04-17 ASSESSMENT — PAIN DESCRIPTION - DESCRIPTORS: DESCRIPTORS: BURNING

## 2022-04-17 ASSESSMENT — PAIN DESCRIPTION - ORIENTATION
ORIENTATION: POSTERIOR
ORIENTATION: POSTERIOR

## 2022-04-17 ASSESSMENT — PAIN DESCRIPTION - PAIN TYPE
TYPE: ACUTE PAIN;SURGICAL PAIN
TYPE: ACUTE PAIN;SURGICAL PAIN

## 2022-04-17 NOTE — PROGRESS NOTES
Physical Medicine & Rehabilitation  Progress Note      Subjective:      62year-old female with nontraumatic cervical spinal cord injury secondary to servical stenosis. Patient is well, but has had some minor complaints of pain in the neck muscles described as aching/spasming pain, requiring pain medications. She reports pain is affecting sleep. She reports having sensation and control over bowels and bladder. Had small BM yesterday but still mild distention/bloating. ROS:  Denies fevers, chills, sweats. No chest pain, palpitations, lightheadedness. Denies coughing, wheezing or shortness of breath. Denies abdominal pain, nausea, diarrhea or constipation. No new areas of joint pain. Denies new areas of numbness or weakness. Denies new anxiety or depression issues. No new skin problems. Rehabilitation:   Progressing in therapies. PT:  Restrictions/Precautions: Fall Risk,General Precautions  Implants present? :  (Lumbar/neck surgery)  Other position/activity restrictions: ambulate pt; s/p C2-C7 fixation  Required Braces or Orthoses  Cervical: c-collar (collar on while out of bed ok to remove while resting in bed)   Transfers  Sit to Stand: Moderate Assistance  Stand to sit: Moderate Assistance  Bed to Chair: Maximum assistance  Comment: Cues for errect posture, decreased coordination noted B LE L LE> R LE. Ambulation 1  Surface: level tile  Device: Rolling Walker  Assistance: Maximum assistance,Moderate assistance  Quality of Gait: Decreased coordiantion noted at LE as well as trunk. Unsteady steps. Pt fearfull of steps  Gait Deviations: Slow Sharon,Decreased step length,Decreased arm swing  Distance: Pt took 4 steps for trasnfers bed>w/c. Transfers  Sit to Stand: Moderate Assistance  Stand to sit: Moderate Assistance  Bed to Chair: Maximum assistance  Comment: Cues for errect posture, decreased coordination noted B LE L LE> R LE.   Ambulation  Ambulation?: Yes  More Ambulation?: No  Ambulation 1  Surface: level tile  Device: Rolling Walker  Assistance: Maximum assistance,Moderate assistance  Quality of Gait: Decreased coordiantion noted at LE as well as trunk. Unsteady steps. Pt fearfull of steps  Gait Deviations: Slow Sharon,Decreased step length,Decreased arm swing  Distance: Pt took 4 steps for trasnfers bed>w/c. Surface: level tile  Ambulation 1  Surface: level tile  Device: Rolling Walker  Assistance: Maximum assistance,Moderate assistance  Quality of Gait: Decreased coordiantion noted at LE as well as trunk. Unsteady steps. Pt fearfull of steps  Gait Deviations: Slow Sharon,Decreased step length,Decreased arm swing  Distance: Pt took 4 steps for trasnfers bed>w/c. OT:  ADL  Grooming: Stand by assistance (Sitting in wheelchair at sink side)  UE Bathing: Stand by assistance (Sitting in wheelchait with vc for cervical precautions)  LE Bathing: Maximum assistance (A with LLE and max A while standing with A for bottom)  UE Dressing: Moderate assistance (A with collar, pulling over head and down back)  LE Dressing: Maximum assistance (Max A standing balance; A threading LLE and pulling over hip)  Toileting: Dependent/Total (Max A standing balance; total A for clothing mgmnt/hygiene)  Additional Comments: see AM OT noted for ADL scores         Balance  Sitting Balance: Minimal assistance (Min A - SBA; Slight LOB while sitting EOB)  Standing Balance: Maximum assistance (L side weakness with buckling)   Standing Balance  Time: 1 mintues x 4  Activity: functional transfers, lower body dressing/bathing, toileting  Comment: with 2 UE support. Therapist completed task while standing due to decerased strength and balance. LLE buckling while standing        Bed mobility  Bridging:  (not indicated at this time)  Sit to Supine: Moderate assistance (A with BLE )  Scooting: Minimal assistance  Comment: Pt returned to supine position at end of session. Pt sat EOB with slight LOB requriing min A to correct.  A with bringing BLE into bed. Transfers  Sit to stand: Maximum assistance  Stand to sit: Maximum assistance  Transfer Comments: Verbal cues for hand placement and safety. L side weakness with LLE buckling   Toilet Transfers  Toilet - Technique: Stand pivot  Equipment Used: Grab bars  Toilet Transfer: Maximum assistance  Toilet Transfers Comments: Verbal cues for hand placement and safety. L side weakness with LLE buckling. Wheelchair Bed Transfers  Wheelchair/Bed - Technique: Stand pivot  Equipment Used: Bed,Wheelchair  Level of Asssistance: Maximum assistance  Wheelchair Transfers Comments: Verbal cues for hand placement and safety. L side weakness with LLE buckling. SPEECH:      Objective:  /64   Pulse 94   Temp 98.8 °F (37.1 °C) (Oral)   Resp 18   Ht 5' 1\" (1.549 m)   SpO2 95%   BMI 26.83 kg/m²       GEN: Well developed, well nourished, in NAD  HEENT:  NCAT. PERRL. EOMI. Mucous membranes pink and moist. Hard cervical collar in place  PULM:  Clear to ausculation. No rales or rhonchi. Respirations WNL and unlabored. CV:  Regular rate rhythm. No murmurs or gallops. GI:  Abdomen soft. Tender, distended. BS + and hypoactive. NEUROLOGICAL: A&O x3. Sensation intact to light touch. .   MSK:  Functional ROM BUE and BLEs. Motor testing 4/5 key muscles BUEs, 3/5 B hip flexion, 3/5 R knee extension, 3-/5 L knee extension, 3-/5 B dorsiflexion . SKIN: Warm dry and intact. Good turgor. EXTREMITIES:  No calf tenderness to palpation. No edema BLEs. PSYCH: Mood WNL. Appropriately interactive. Affect WNL.      Diagnostics:     CBC: Recent Labs     04/14/22  1142 04/15/22  0534 04/16/22  0730   WBC  --  7.3 8.6   RBC  --  3.19* 3.34*   HGB 8.6* 9.0* 9.4*   HCT 27.3* 29.1* 28.9*   MCV  --  91.2 86.6   RDW  --  17.2* 18.4*   PLT  --  213 295     BMP:   Recent Labs     04/15/22  0534 04/16/22  0730   * 133*   K 3.2* 3.8    97*   CO2 22 24   BUN 9 8   CREATININE 0.54 0.54   GLUCOSE 93 95     BNP: No results for input(s): BNP in the last 72 hours. PT/INR: No results for input(s): PROTIME, INR in the last 72 hours. APTT: No results for input(s): APTT in the last 72 hours. CARDIAC ENZYMES: No results for input(s): CKMB, CKMBINDEX, TROPONINT in the last 72 hours. Invalid input(s): CKTOTAL;3 troponins   FASTING LIPID PANEL:  Lab Results   Component Value Date    CHOL 198 07/23/2020     (A) 07/23/2020    TRIG 96 07/23/2020     LIVER PROFILE: No results for input(s): AST, ALT, ALB, BILIDIR, BILITOT, ALKPHOS in the last 72 hours.      Current Medications:   Current Facility-Administered Medications: lisinopril (PRINIVIL;ZESTRIL) tablet 5 mg, 5 mg, Oral, Daily  bisacodyl (DULCOLAX) suppository 10 mg, 10 mg, Rectal, Daily PRN  polyethylene glycol (GLYCOLAX) packet 17 g, 17 g, Oral, Daily  senna (SENOKOT) tablet 17.2 mg, 2 tablet, Oral, Daily PRN  acetaminophen (TYLENOL) tablet 650 mg, 650 mg, Oral, Q6H  magnesium hydroxide (MILK OF MAGNESIA) 400 MG/5ML suspension 30 mL, 30 mL, Oral, Daily PRN  ondansetron (ZOFRAN-ODT) disintegrating tablet 4 mg, 4 mg, Oral, Q8H PRN **OR** [DISCONTINUED] ondansetron (ZOFRAN) injection 4 mg, 4 mg, IntraVENous, Q6H PRN  oxyCODONE (ROXICODONE) immediate release tablet 5 mg, 5 mg, Oral, Q4H PRN **OR** oxyCODONE HCl (OXY-IR) immediate release tablet 10 mg, 10 mg, Oral, Q4H PRN  busPIRone (BUSPAR) tablet 30 mg, 30 mg, Oral, BID  dilTIAZem (CARDIZEM CD) extended release capsule 180 mg, 180 mg, Oral, Daily  docusate sodium (COLACE) capsule 200 mg, 200 mg, Oral, BID  enoxaparin (LOVENOX) injection 40 mg, 40 mg, SubCUTAneous, Daily  ferrous sulfate (IRON 325) tablet 325 mg, 325 mg, Oral, BID WC  levothyroxine (SYNTHROID) tablet 88 mcg, 88 mcg, Oral, Daily  methadone (DOLOPHINE) tablet 10 mg, 10 mg, Oral, BID  methocarbamol (ROBAXIN) tablet 750 mg, 750 mg, Oral, 4x Daily PRN  neomycin-bacitracin-polymyxin (NEOSPORIN) ointment, , Topical, BID  pantoprazole (PROTONIX) tablet 40 mg, 40 mg, Oral, Daily  [START ON 4/20/2022] Vitamin D (CHOLECALCIFEROL) tablet 5,000 Units, 5,000 Units, Oral, Weekly      Impression/Plan:   Impaired ADLs, gait, and mobility due to:      1. Nontraumatic cervical spinal cord injury secondary to cervical stenosis: s/p C5 corpectomy, C4-6 arthrodesis, open reduction cervical kyphosis, C2-7 posterior fusion performed by Dr. Shankar Thayer on 4/12/2022. PT/OT for gait, mobility, strengthening, endurance, ADLs, and self care. Has Tylenol prn, methadone BID, robaxin prn, oxycodone prn for pain. Will discontinue Robaxin and change to routine baclofen for muscle spasms/pain. 2. Hx cauda equina syndrome: s/p L2-S1 laminectomy (December 2021)  3. HTN: on Diltiazem. 4. Anemia: Hb low but improving. On ferrous sulfate. Monitoring. 5. Hypothyroidism: on levothyroxine. 6. Hyponatremia: stable. Monitoring. Medical management by IM  7. GERD: on pantoprazole. 8. Anxiety:on buspar. 9. Vitamin D deficiency: on repletion weekly  10. Hx breast cancer  11. Bowel Management: Miralax daily, senokot prn, dulcolax prn. Has milk of magnesia prn  12. DVT Prophylaxis:  low molecular weight heparin, SCD's while in bed and HERMINIO's   13. Internal medicine for medical management      Electronically signed by Hung Oro MD on 4/17/2022 at 10:14 AM      This note is created with the assistance of a speech recognition program.  While intending to generate a document that actually reflects the content of the visit, the document can still have some errors including those of syntax and sound a like substitutions which may escape proof reading. In such instances, actual meaning can be extrapolated by contextual diversion.

## 2022-04-17 NOTE — PLAN OF CARE
Individualized Plan of Care  [facility]    Rehabilitation physician: Dr Michelle Loja Date: 4/15/2022     Rehabilitation Diagnosis: Spinal cord injury, cervical region, sequela ([facility] [S14.109S]     Rehabilitation impairments: self care, mobility, motor dysfunction, bowel/bladder management, pain management and safety    Factors facilitating achievement of predicted outcomes: Family support, Motivated, Cooperative, Pleasant, Good insight into deficits, Has needed Durable Medical Equipment at home and Knowledge about rehab  Barriers to the achievement of predicted outcomes: Pain, Decreased endurance, Upper extremity weakness, Lower extremity weakness, Skin Care and Medication managment    Patient Goals: Improve independence with mobility, Increase overall strength and endurance, Increase balance, Increase independence with activities of daily living, Increase safety awareness, Integrate appropriate pain management plan, Assure adequate nutritional option for discharge, Continence of bowel and bladder and Provide appropriate patient and family education      NURSING:  Nursing goals for Ora Plaster while on the rehabilitation unit will include:  Continence of bowel and bladder, Adequate number of bowel movements, Urinate with no urinary retention >300ml in bladder, Maintain O2 SATs at an acceptable level during stay, Effective pain management while on the rehabilitation unit, Establish adequate pain control plan for discharge, Absence of skin breakdown while on the rehabilitation unit, Improved skin integrity via assessments including wound measurements, Avoidance of any hospital acquired infections, No signs/symptoms of infection at the wound site, Freedom from injury during hospitalization and Complete education with patient/family with understanding demonstrated regarding disease process and resultant impairment     In order to achieve these goals, nursing interventions may include bowel/bladder training, education for medical assistive devices, medication education, O2 saturation management, energy conservation, stress management techniques, fall prevention, alarms protocol, seating and positioning, skin/wound care, pressure relief instruction, dressing changes, infection protection, DVT prophylaxis, assistance with safe transfers , and/or assistance with bathroom activities and hygiene. PHYSICAL THERAPY:  Goals:        Short term goals  Time Frame for Short term goals: 10 days  Short term goal 1: Pt will perform bed mobility/rolling with SBA  Short term goal 2: Pt will perform transfers with Miryam  Short term goal 3: Pt will ambulate 50 to 100 ft, with rolling walker, Miryam. Short term goal 4: Pt will perform wheelchair mobility for 150 ft, SB stright path, min A for corners. Short term goal 5: Pt able to perform 3 to 5 steps with 2 rails, min/mod A  Long term goals  Time Frame for Long term goals : By DC  Long term goal 1: Pt able to perform sit<>stand transfers, mod-I  Long term goal 2: Pt able to perform pivot transfers at SBA/supervsion. Long term goal 3: Pt able to ambulate with rolling walker 100 to 150 ft , level surfaces, with rolling walker, CGA. Long term goal 4: Pt able to perform curb step with UE support at [address] term goal 5: Pt able to perform 5 to 12 steps with 2 rails at min A to improve LE strength/coordination  Long term goal 6: Pt able to propel w/c  distance of 150 ft SBA level surfaces  Long term goal 7: Pt able to ambulate on outside terraine/incline surface at min A with rolling walker  Long term goal 8: Pt able to ambulate distance of 60 to 70 ft for 2MWT to improve fucntion and gait speed.   Long term goal 5: Family training for safe functional mobility for DC home    Plan of Care: Pt to be seen by physical therapy services 1 Hour 30 Minutes per day at least 5 out of 7 days per week     Anticipated interventions may include therapeutic exercises, gait training, neuromuscular re-ed, transfer training, community reintegration, bed mobility, w/c mobility and training.       OCCUPATIONAL THERAPY:  Goals:             Short term goals  Time Frame for Short term goals: By 1 week  Short term goal 1: Pt will complete lower body dressing/bathing with min A and Good safety with use of AE as needed  Short term goal 2: Pt will complete upper body dressing with CGA  and Good safety while maintaining cervical precautions  Short term goal 3: Pt will complete functional transfers during self care tasks with min A and Good safety  Short term goal 4: Pt will tolerate standing 4+ minutes during functional activity of choice with min A and Good safety  Short term goal 5: Pt will verbalize/demonstrate Good understanding of cervical precautions during self care tasks with increase safety and independence with daily activities  Short term goal 6: Pt will participate in 30+ mintues of therapeutic exercises/functional activities to increase safety and independence with self care and mobility  Long term goals  Time Frame for Long term goals : By discharge  Long term goal 1: Pt will complete BADLs with modified independence and Good safety while maintaining cervical precautions  Long term goal 2: Pt will complete functional transfers during self care tasks with modified independence with Good safety   Long term goal 3: Pt will tolerate standing 8+ mintues during functional activity of choice with Good safety  Long term goal 4: Pt will verbalize/demonstrate Good understanding of home safety/fall prevention strategies to increase safety and independence with self care and mobility  Long term goal 5: Pt will verbalize/demonstrate Good understanding of adaptive strategies/AE/DME to assisit with maintaining cervical precautions and increase safety and independence with self care and mobility  Long term goals 6: Pt will complete simple meal prep/light house keeping tasks with CGA and Good safety  Long term goal 7: OT to assess FM and  strength    Plan of Care: Patient to be seen by occupational therapy services 1 Hour 30 Minutes per day at least 5 out of 7 days per week     Anticipated interventions may include ADL and IADL retraining, strengthening, safety education and training, patient/caregiver education and training, equipment evaluation/ training/procurement, neuromuscular reeducation, wheelchair mobility training. SPEECH THERAPY:   Goals:                      Plan of Care:     CASE MANAGEMENT:  Goals:   Assist patient/family with discharge planning, patient/family counseling,  and coordination with insurance during the inpatient rehabilitation stay. Other members of the multidisciplinary rehabilitation team that will be involved in the patient's plan of care include recreational therapy, dietary, respiratory therapy, and neuropsychology. Medical issues being managed closely and that require 24 hour availability of a physician:  Bowel/Bladder function, Wound care, Pain management, Infection protection, DVT prophylaxis, Fall precautions, Fluid/Electrolyte balance, Nutritional status, Anemia and History of heart disease                                           Physician anticipated functional outcomes: Improved independence with functional measures   Estimated length of stay for this admission 2 weeks  Medical Prognosis: Fair  Anticipated disposition: Home. The potential to achieve the above medical and rehabilitative goals is fair. This plan of care has been developed with the assistance and input of the multidisciplinary rehabilitation team.  The plan was reviewed with the patient on 4/17/2022. The patient has had the opportunity to provide input to the therapy team.    I have reviewed this Individualized Plan of Care and agree with its contents.   Above documentation has been expanded, modified, adjusted to reflect the findings of my evaluations and goals for the patient.     Physician:  Electronically signed by Diane Guerra MD on 4/17/22 at 10:17 AM EDT

## 2022-04-17 NOTE — PROGRESS NOTES
[address] [facility]    ACUTE REHABILITATION OCCUPATIONAL THERAPY  DAILY NOTE    Date: 22  Patient Name: Nica Morales      Room: 5907/8993-88    MRN: [id number]   : 1960  (62 y.o.)  Gender: female   Referring Practitioner: Kari Hardin MD  Diagnosis: Nontraumatic cervical spinal cord injury       Restrictions  Restrictions/Precautions: Fall Risk,General Precautions  Implants present? :  (Lumbar/neck surgery)  Other position/activity restrictions: ambulate pt; s/p C2-C7 fixation  Required Braces or Orthoses  Cervical: c-collar (collar on while out of bed ok to remove while resting in bed)  Required Braces or Orthoses?: Yes  Equipment Used: Bed,Wheelchair    Subjective  Comments: pleasant and cooperative for OT tx  Patient Currently in Pain: Yes  Pain Level: 7  Pain Location: Neck  Pain Orientation: Posterior  Restrictions/Precautions: Fall Risk;General Precautions  Overall Orientation Status: Within Functional Limits     Pain Assessment  Pain Assessment: 0-10  Pain Level: 7  Pain Type: Acute pain,Surgical pain  Pain Location: Neck  Pain Orientation: Posterior  Pain Descriptors: Burning    Objective  Cognition  Overall Cognitive Status: WFL  Perception  Overall Perceptual Status: WFL  Balance  Sitting Balance: Contact guard assistance (EOB for donning shoes )  Standing Balance: Moderate assistance (MIN/MOD A )  Bed mobility  Sit to Supine: Moderate assistance (A with BLEs)  Scooting: Minimal assistance;2 Person assistance (Bed boost req MIN A x2)  Transfers  Stand Step Transfers: Minimal assistance  Sit to stand: Moderate assistance  Stand to sit: Moderate assistance  Transfer Comments: Pt fluctuates with assistance between MIN-MOD A, vc for hand/foot placement pre/post transfers for increased pt safety, vc for seqeuncing of transfers  Standing Balance  Time: AM: <1 min x5  Activity: functional transfers, lower body dressing/bathing, toileting  Comment: with 1-2 UE support.  LLE buckling, req vc to lock L knee for increased safety. 1 LOB req MOD A for recovery   Functional Mobility  Functional - Mobility Device: wheelchair  Activity: To/from bathroom  Assist Level: Dependent/Total  Functional Mobility Comments: did not self propel   Toilet Transfers  Toilet - Technique: Stand pivot; To left; To right  Equipment Used: Grab bars  Toilet Transfer: Moderate assistance  Toilet Transfers Comments: Verbal cues for hand placement and safety. L side weakness with LLE buckling. Exercises  Grasp/Release: Hand spring gripper on 2nd setting utilized to increase B hand strength needed to participate in ADLs safely and independently. G tolerance noted  (x15 B hands )  Other: Red theraflex excerises facilitated to support strength needed to participate in daily tasks independently. G tolerance noted (BUEs , x15 )                       ADL  Equipment Provided: Sock aid;Reacher  Feeding: Setup  Grooming: Minimal assistance (seated in w/c sinklevel; A brushing hair to adhere percuatio)  UE Bathing: Stand by assistance (Seated sinklevel )  LE Bathing: Moderate assistance (MOD A standing, A post juanita care, figure 4 for BLEs/feet)  UE Dressing: Moderate assistance;Setup;Verbal cueing (A for cervical collar and over head shirt )  LE Dressing: Moderate assistance;Setup;Verbal cueing (A doffing/donning pants/brief over hips; MOD A standing )  Toileting: Moderate assistance; Increased time to complete (A brief/pants over hips and juanita care post BM )  Additional Comments: HUNG provided and demonstrated use AE (reacher and sock aid) to pt this AM to increase ease in dressing tasks. Pt able to redemo utilizing AE with verbal and visual  for proper use, G reception and return noted. PM LE Dressing: Pt seated EOB, A for TEDs and pt able to don shoes with ties, with vc for figure 4 tech to increase ease and independence for dressing task          Assessment  Performance deficits / Impairments: Decreased ADL status; Decreased functional mobility ; Decreased ROM; Decreased strength;Decreased safe awareness;Decreased endurance;Decreased balance;Decreased high-level IADLs;Decreased fine motor control;Decreased coordination;Decreased posture  Prognosis: Good  Discharge Recommendations: Patient would benefit from continued therapy after discharge;Home with assist PRN  Activity Tolerance: Patient Tolerated treatment well  Safety Devices in place: Yes  Type of devices: All fall risk precautions in place;Call light within reach;Gait belt;Patient at risk for falls; Left in bed;Nurse notified  Restraints  Initially in place: No        Patient Goals   Patient goals : \"To get my body that way so that I can take care of things on my own. \"  Learner:patient  Method: demonstration and explanation       Outcome: needs reinforcement        Plan  Plan  Times per week: 5-7  Times per day: Twice a day  Current Treatment Recommendations: Self-Care / ADL,Strengthening,Balance Training,Functional Mobility Training,Endurance Training,Pain Management,Safety Education & Training,Patient/Caregiver Education & Training,Equipment Evaluation, Education, & procurement,Home Management Training  Patient Goals   Patient goals : \"To get my body that way so that I can take care of things on my own. \"  Short term goals  Time Frame for Short term goals: By 1 week  Short term goal 1: Pt will complete lower body dressing/bathing with min A and Good safety with use of AE as needed  Short term goal 2: Pt will complete upper body dressing with CGA  and Good safety while maintaining cervical precautions  Short term goal 3: Pt will complete functional transfers during self care tasks with min A and Good safety  Short term goal 4: Pt will tolerate standing 4+ minutes during functional activity of choice with min A and Good safety  Short term goal 5: Pt will verbalize/demonstrate Good understanding of cervical precautions during self care tasks with increase safety and independence with daily activities  Short term goal 6: Pt will participate in 30+ mintues of therapeutic exercises/functional activities to increase safety and independence with self care and mobility  Long term goals  Time Frame for Long term goals : By discharge  Long term goal 1: Pt will complete BADLs with modified independence and Good safety while maintaining cervical precautions  Long term goal 2: Pt will complete functional transfers during self care tasks with modified independence with Good safety   Long term goal 3: Pt will tolerate standing 8+ mintues during functional activity of choice with Good safety  Long term goal 4: Pt will verbalize/demonstrate Good understanding of home safety/fall prevention strategies to increase safety and independence with self care and mobility  Long term goal 5: Pt will verbalize/demonstrate Good understanding of adaptive strategies/AE/DME to assisit with maintaining cervical precautions and increase safety and independence with self care and mobility  Long term goals 6: Pt will complete simple meal prep/light house keeping tasks with CGA and Good safety  Long term goal 7: OT to assess FM and  strength        04/17/22 1101 04/17/22 1335   OT Individual Minutes   Time In 1101 1335   Time Out 1203 1403   Minutes 62 28     Electronically signed by SHERLYN Vora on 4/17/22 at 3:58 PM EDT

## 2022-04-17 NOTE — PROGRESS NOTES
Physical Therapy  [facility] Rehabilitation Physical Therapy Progress Note    Date: 22  Patient Name: Dominique Bartlett       Room: 0471/7939-82  MRN: [id number]   Account: [de-identified]   : 1960  (62 y.o.) Gender: female     Referring Practitioner: Luciana Glaser MD  Diagnosis: Nontraumatic cervical spinal cord injury  Past Medical History:  has a past medical history of Anxiety, Arthritis, At maximum risk for fall, Cancer ([address] Cauda equina syndrome ([address] Cervical stenosis of spine, GERD (gastroesophageal reflux disease), History of stomach ulcers, Hypertension, Hypothyroidism, Lumbar neuritis, Post laminectomy syndrome, Spinal deformity, Thyroid disease, Uses wheelchair, Wears dentures, and Wellness examination. Past Surgical History:   has a past surgical history that includes hernia repair (Bilateral); Breast surgery (Right); Mastectomy, radical; Hysterectomy, total abdominal; Lumbar spine surgery (N/A, 2021); back surgery; Colonoscopy (about ); other surgical history (2022); cervical fusion (N/A, 2022); and cervical fusion (N/A, 2022). Additional Pertinent Hx: Dominique Bartlett is a 62 y.o. female with history of cauda equina syndrome s/p L2-S1 laminectomy (2021), HTN, hypothyroidism, GERD, breast cancer, and anxiety admitted to [facility] on 2022. She presented for planned surgical intervention for cervical stenosis. She underwent C5 corpectomy with C4-C6 arthrodesis, open reduction of cervical kyphotic deformity, and C2-C7 posterior fusion on 22 (Dr. Arlen Andre). She has a cervical collar when out of bed. After her lumbar surgery in Dec 2021, patient had been improving initially with therapy, however overall has regressed. . Pt admitted to rehab unit on 4/15/22.     Overall Orientation Status: Within Functional Limits  Restrictions/Precautions  Restrictions/Precautions: Fall Risk;General Precautions  Required Braces or Orthoses?: Yes  Implants present? :  (Lumbar/Neck surgery)  Required Braces or Orthoses  Cervical: c-collar (aspen collar on while out of bed ok to remove while resting in bed and eating)  Position Activity Restriction  Other position/activity restrictions: ambulate pt; s/p C2-C7 fixation    Subjective: Pt resting in bed upon arrival, agreeable to PT, despite high pain       Vital Signs  Patient Currently in Pain: Yes  Pain Assessment: 0-10  Pain Level: 7  Response to Pain Intervention: Patient Satisfied                Bed Mobility:   Bed Mobility  Bridging:  (not indicated at this time)  Supine to Sit: Minimal assistance  Sit to Supine: Minimal assistance  Scooting: Minimal assistance  Comment: Cervical Collar donned. Bed mobility  Bridging:  (not indicated at this time)  Scooting: Minimal assistance    Transfers:  Sit to Stand: Moderate Assistance  Stand to sit: Moderate Assistance  Bed to Chair: Maximum assistance              Ambulation 1  Surface: level tile  Device: Parallel Bars  Assistance: Maximum assistance  Quality of Gait: Decreased coordiantion noted at LE as well as trunk. Unsteady steps. Pt fearfull of steps  Gait Deviations: Slow Sharon;Decreased step length;Decreased arm swing  Distance: FWD and BWD amb. in // bars 8 ft x 3 reps (Breaks given PRN. )                       Wheelchair Activities  Wheelchair Type: Recliner  Wheelchair Cushion: Standard  Pressure Relief Type: Lateral lean  Level of Assistance for pressure relief activities: Minimal assistance  Wheelchair Parts Management: No  Propulsion: Yes  Propulsion 1  Propulsion: Manual  Level: Level Tile  Method: RUE;LUE  Level of Assistance: Minimal assistance; Moderate assistance  Description/ Details: min A for straight path. mod A for wide turns. Distance: 60 ft, with 2 to 3 rest breaks.         BALANCE Posture: Fair  Sitting - Static: Good;-  Sitting - Dynamic: Fair  Standing - Static: Fair  Standing - Dynamic: Poor;+  Comments: Standing with rolling walker. Activity Tolerance: Patient limited by pain,Patient limited by fatigue,Patient limited by endurance  PT Equipment Recommendations  Other: Pt has rolling walker, rollator, manual wheelchair and lift chair at home           Current Treatment Recommendations: Strengthening,ROM,Balance Training,Endurance Training,Functional Mobility Training,Transfer Training,ADL/Self-care Training,Stair training,Gait [employer] Exercise Program,Safety Education & Training,Patient/Caregiver Education & Training,Equipment Evaluation, Education, & procurement,Neuromuscular Re-education    Conditions Requiring Skilled Therapeutic Intervention  Body structures, Functions, Activity limitations: Decreased functional mobility ; Decreased ADL status; Decreased strength;Decreased endurance;Decreased posture;Decreased balance;Decreased coordination; Increased pain  Assessment: Pt require mod A for bed mobility, max A for sit <> stand and stand weight shifts, Pt able to take 12-13 step RW, mod A +CGA of 2nd person. VC for foot placement and weight shifting, pt will continue to benefit from PT to progress activity and promote safety with gait and transfers  Treatment Diagnosis: B LE/B UE weakness, difficulty walking  Prognosis: Good  Decision Making: High Complexity  Barriers to Learning: none  REQUIRES PT FOLLOW UP: Yes  Discharge Recommendations: Home with assist PRN;Patient would benefit from continued therapy after discharge    Goals  Short term goals  Time Frame for Short term goals: 10 days  Short term goal 1: Pt will perform bed mobility/rolling with SBA  Short term goal 2: Pt will perform transfers with Miryam  Short term goal 3: Pt will ambulate 50 to 100 ft, with rolling walker, Miryam. Short term goal 4: Pt will perform wheelchair mobility for 150 ft, SB stright path, min A for corners. Short term goal 5: Pt able to perform 3 to 5 steps with 2 rails, min/mod A  Long term goals  Time Frame for Long term goals :  By DC  Long term goal 1: Pt able to perform sit<>stand transfers, mod-I  Long term goal 2: Pt able to perform pivot transfers at SBA/supervsion. Long term goal 3: Pt able to ambulate with rolling walker 100 to 150 ft , level surfaces, with rolling walker, CGA. Long term goal 4: Pt able to perform curb step with UE support at [address] term goal 5: Pt able to perform 5 to 12 steps with 2 rails at min A to improve LE strength/coordination  Long term goal 6: Pt able to propel w/c  distance of 150 ft SBA level surfaces  Long term goal 7: Pt able to ambulate on outside terraine/incline surface at min A with rolling walker  Long term goal 8: Pt able to ambulate distance of 60 to 70 ft for 2MWT to improve fucntion and gait speed.   Long term goal 5: Family training for safe functional mobility for DC home       04/17/22 1000 04/17/22 1400   PT Individual Minutes   Time In 0124 [id number]   Time Out 2378 0036   Minutes 45 30   PT Concurrent Minutes   Time In 1000  --    Time Out 1015  --    Minutes 15  --        Electronically signed by Nica Vivar PTA on 4/17/22 at 3:51 PM EDT

## 2022-04-17 NOTE — PROGRESS NOTES
Physical Therapy  [address] [facility]  Acute Rehabilitation Physical Therapy Progress Note    Date: 22  Patient Name: Julio Guaman       Room: 7814/1030-83  MRN: [id number]   Account: [de-identified]   : 1960  (62 y.o.) Gender: female     Referring Practitioner: Bisi Lynn MD  Diagnosis: Nontraumatic cervical spinal cord injury  Past Medical History:  has a past medical history of Anxiety, Arthritis, At maximum risk for fall, Cancer ([address] Cauda equina syndrome ([address] Cervical stenosis of spine, GERD (gastroesophageal reflux disease), History of stomach ulcers, Hypertension, Hypothyroidism, Lumbar neuritis, Post laminectomy syndrome, Spinal deformity, Thyroid disease, Uses wheelchair, Wears dentures, and Wellness examination. Past Surgical History:   has a past surgical history that includes hernia repair (Bilateral); Breast surgery (Right); Mastectomy, radical; Hysterectomy, total abdominal; Lumbar spine surgery (N/A, 2021); back surgery; Colonoscopy (about ); other surgical history (2022); cervical fusion (N/A, 2022); and cervical fusion (N/A, 2022). Additional Pertinent Hx: Julio Guaman is a 62 y.o. female with history of cauda equina syndrome s/p L2-S1 laminectomy (2021), HTN, hypothyroidism, GERD, breast cancer, and anxiety admitted to [facility] on 2022. She presented for planned surgical intervention for cervical stenosis. She underwent C5 corpectomy with C4-C6 arthrodesis, open reduction of cervical kyphotic deformity, and C2-C7 posterior fusion on 22 (Dr. Mily Romero). She has a cervical collar when out of bed. After her lumbar surgery in Dec 2021, patient had been improving initially with therapy, however overall has regressed. . Pt admitted to rehab unit on 4/15/22.     Overall Orientation Status: Within Functional Limits  Restrictions/Precautions  Restrictions/Precautions: Fall Risk;General Precautions  Required Braces or Orthoses?: Yes  Implants present? :  (Lumbar/Neck surgery)  Required Braces or Orthoses  Cervical: c-collar (aspen collar on while out of bed ok to remove while resting in bed and eating)  Position Activity Restriction  Other position/activity restrictions: ambulate pt; s/p C2-C7 fixation    Subjective: Pt resting in bed upon arrival, agreeable to PT, despite high pain       Vital Signs  Patient Currently in Pain: Yes  Pain Assessment: 0-10  Pain Level: 7  Response to Pain Intervention: Patient Satisfied                Bed Mobility:   Bed Mobility  Bridging:  (not indicated at this time)  Supine to Sit: Minimal assistance  Sit to Supine: Minimal assistance  Scooting: Minimal assistance  Comment: Cervical Collar donned. Bed mobility  Bridging:  (not indicated at this time)  Scooting: Minimal assistance    Transfers:  Sit to Stand: Moderate Assistance  Stand to sit: Moderate Assistance  Bed to Chair: Maximum assistance              Ambulation 1  Surface: level tile  Device: Parallel Bars  Assistance: Maximum assistance  Quality of Gait: Decreased coordiantion noted at LE as well as trunk. Unsteady steps. Pt fearfull of steps  Gait Deviations: Slow Sharon;Decreased step length;Decreased arm swing  Distance: FWD and BWD amb. in // bars 8 ft x 3 reps (Breaks given PRN. )                       Wheelchair Activities  Wheelchair Type: Recliner  Wheelchair Cushion: Standard  Pressure Relief Type: Lateral lean  Level of Assistance for pressure relief activities: Minimal assistance  Wheelchair Parts Management: No  Propulsion: Yes  Propulsion 1  Propulsion: Manual  Level: Level Tile  Method: RUE;LUE  Level of Assistance: Minimal assistance; Moderate assistance  Description/ Details: min A for straight path. mod A for wide turns. Distance: 60 ft, with 2 to 3 rest breaks.            BALANCE Posture: Fair  Sitting - Static: Good;-  Sitting - Dynamic: Fair  Standing - Static: Fair  Standing - Dynamic: Poor;+  Comments: Standing with rolling walker. Activity Tolerance: Patient limited by pain,Patient limited by fatigue,Patient limited by endurance  PT Equipment Recommendations  Other: Pt has rolling walker, rollator, manual wheelchair and lift chair at home         Current Treatment Recommendations: Strengthening,ROM,Balance Training,Endurance Training,Functional Mobility Training,Transfer Training,ADL/Self-care Training,Stair training,Gait [employer] Exercise Program,Safety Education & Training,Patient/Caregiver Education & Training,Equipment Evaluation, Education, & procurement,Neuromuscular Re-education    Conditions Requiring Skilled Therapeutic Intervention  Body structures, Functions, Activity limitations: Decreased functional mobility ; Decreased ADL status; Decreased strength;Decreased endurance;Decreased posture;Decreased balance;Decreased coordination; Increased pain  Assessment: Pt require mod A for bed mobility, max A for sit <> stand and stand weight shifts, Pt able to take 12-13 step RW, mod A +CGA of 2nd person. VC for foot placement and weight shifting, pt will continue to benefit from PT to progress activity and promote safety with gait and transfers  Treatment Diagnosis: B LE/B UE weakness, difficulty walking  Prognosis: Good  Decision Making: High Complexity  Barriers to Learning: none  REQUIRES PT FOLLOW UP: Yes  Discharge Recommendations: Home with assist PRN;Patient would benefit from continued therapy after discharge    Goals  Short term goals  Time Frame for Short term goals: 10 days  Short term goal 1: Pt will perform bed mobility/rolling with SBA  Short term goal 2: Pt will perform transfers with Miryam  Short term goal 3: Pt will ambulate 50 to 100 ft, with rolling walker, Miryam. Short term goal 4: Pt will perform wheelchair mobility for 150 ft, SB stright path, min A for corners. Short term goal 5: Pt able to perform 3 to 5 steps with 2 rails, min/mod A  Long term goals  Time Frame for Long term goals :  By DC  Long term goal 1: Pt able to perform sit<>stand transfers, mod-I  Long term goal 2: Pt able to perform pivot transfers at SBA/supervsion. Long term goal 3: Pt able to ambulate with rolling walker 100 to 150 ft , level surfaces, with rolling walker, CGA. Long term goal 4: Pt able to perform curb step with UE support at [address] term goal 5: Pt able to perform 5 to 12 steps with 2 rails at min A to improve LE strength/coordination  Long term goal 6: Pt able to propel w/c  distance of 150 ft SBA level surfaces  Long term goal 7: Pt able to ambulate on outside terraine/incline surface at min A with rolling walker  Long term goal 8: Pt able to ambulate distance of 60 to 70 ft for 2MWT to improve fucntion and gait speed.   Long term goal 5: Family training for safe functional mobility for DC home       04/17/22 1000 04/17/22 1400   PT Individual Minutes   Time In [id number]   Time Out 8036 0006   Minutes 45 30   PT Concurrent Minutes   Time In 1000  --    Time Out 1015  --    Minutes 15  --        Electronically signed by Andrzej Snyder PTA on 4/17/22 at 3:49 PM EDT

## 2022-04-17 NOTE — CONSULTS
[facility] Internal Medicine  Serge Elizondo MD; Renato Carmona MD; Lilia Richardson MD; MD Anil Pitts MD; Jennifer Mehta MD    [facility] Internal Medicine   [address] / HISTORY AND PHYSICAL EXAMINATION            Date:   4/16/2022  Patient name:  Maya Nevarez  Date of admission:  4/15/2022  5:32 PM  MRN:   [id number]  Account:  [de-identified]  YOB: 1960  PCP:    Alex Fountain MD  Room:   [facility]  Code Status:    Full Code    Physician Requesting Consult: Edie Kline MD    Reason for Consult: Medical management    Chief Complaint:     No chief complaint on file. Underwent anterior C5 corpectomy, posterior fixation from C2-T1 with arthrodesis, osteotomy, correction of deformity on April 12    History Obtained From:     patient    History of Present Illness: The patient is a 61 y.o. female presented for elective surgery. Underwent anterior C5 corpectomy, posterior fixation from C2-T1 with arthrodesis, osteotomy, correction of deformity on 4/12 with Dr. Jose Damon 4/12. Symptoms include significant left-sided paresis, severe lower extremity weakness (wheelchair bound, now unable to stand or ambulate [id number]), progressively worsening paresthesias and dexterity issues with left upper extremity and left lower extremity.  Additionally complains of some moderate saddle anesthesia, urge incontinence but no bowel dysfunction.   Admitted to acute rehab for further management for deconditioning,        Past Medical History:     Past Medical History:   Diagnosis Date    Anxiety     Arthritis     At maximum risk for fall 12/29/2021    caudal equina syndrome and cervical stenosis    Cancer ([address]     right breast    Cauda equina syndrome ([address] 12/29/2021    neuropathy, mobility difficulty, incontinance    Cervical stenosis of spine 12/29/2021    GERD (gastroesophageal reflux disease)     History of stomach ulcers     Hypertension     Dr. Zilphia Sacks    Hypothyroidism     Lumbar neuritis     Post laminectomy syndrome     Spinal deformity 11/2021    cervical and lumbar    Thyroid disease     Uses wheelchair     Wears dentures     Wellness examination     Dr. Zilphia Sacks        Past Surgical History:     Past Surgical History:   Procedure Laterality Date    BACK SURGERY      lower back x 3, cervical- x 1: C4- C6, 11/4/2014     BREAST SURGERY Right     mastectomy with reconstruction    CERVICAL FUSION N/A 4/12/2022    C5 CORPECTOMY, USE OF INTRAOPERATIVE CERVICAL TRACTION performed by Jennifer Erickson DO at [address] [place] [address] N/A 4/12/2022    POSTERIOR FIXATION C2-C7 performed by Jennifer Erickson DO at [place] [address]  about 2018    HERNIA REPAIR Bilateral     inguinal    HYSTERECTOMY, TOTAL ABDOMINAL      LUMBAR SPINE SURGERY N/A 12/30/2021    LUMBAR LAMINECTOMY L2-S1 performed by Jennifer Erickson DO at [address], [facility]      OTHER SURGICAL HISTORY  04/12/2022    C5 CORPECTOMY, USE OF INTRAOPERATIVE CERVICAL TRACTION (N/A Spine Cervical)         Medications Prior to Admission:     Prior to Admission medications    Medication Sig Start Date End Date Taking?  Authorizing Provider   dilTIAZem (DILACOR XR) 180 MG extended release capsule TAKE ONE CAPSULE BY MOUTH DAILY 4/1/22   Phani Patel MD   ferrous sulfate (IRON 325) 325 (65 Fe) MG tablet Take 1 tablet by mouth 2 times daily (with meals) 1/13/22   Tabitha Treviño MD   docusate sodium (COLACE) 100 MG capsule Take 2 capsules by mouth 2 times daily 1/13/22   Tabitha Treviño MD   pantoprazole (PROTONIX) 40 MG tablet TAKE ONE TABLET BY MOUTH DAILY 12/20/21   Daquan Mariscal MD   busPIRone (BUSPAR) 30 MG tablet Take 30 mg by mouth 2 times daily 12/17/21   Daquan Mariscal MD   levothyroxine (SYNTHROID) 88 MCG tablet Take 1 tablet by mouth Daily 12/17/21 3/18/22  Daquan Mariscal MD   tiZANidine (ZANAFLEX) 4 MG tablet Take 4 mg by mouth every 12 hours    Historical Provider, MD   Vitamin D, Ergocalciferol, 50 MCG (2000 UT) CAPS TAKE ONE CAPSULE BY MOUTH DAILY 3/29/21   La Lechuga PA-C   HYDROcodone-acetaminophen [facility]) 7.5-325 MG per tablet Up to three tablets a day. 10/21/15   Historical Provider, MD   methadone (DOLOPHINE) 10 MG tablet Take 10 mg by mouth 2 times daily. Historical Provider, MD        Allergies:     Latex and Kiwi extract    Social History:     Tobacco:    reports that she quit smoking about 8 years ago. She has a 15.00 pack-year smoking history. She has never used smokeless tobacco.  Alcohol:      reports current alcohol use. Drug Use:  reports no history of drug use. Family History:     Family History   Problem Relation Age of Onset    Hypertension Mother     Heart Disease Mother     Cancer Mother        Review of Systems:     Positive and Negative as described in HPI. CONSTITUTIONAL:  negative for fevers, chills, sweats, fatigue, weight loss  HEENT:  negative for vision, hearing changes, runny nose, throat pain  RESPIRATORY:  negative for shortness of breath, cough, congestion, wheezing. CARDIOVASCULAR:  negative for chest pain, palpitations.   GASTROINTESTINAL:  negative for nausea, vomiting, diarrhea, constipation, change in bowel habits, abdominal pain   GENITOURINARY:  negative for difficulty of urination, burning with urination, frequency   INTEGUMENT:  negative for rash, skin lesions, easy bruising   HEMATOLOGIC/LYMPHATIC:  negative for swelling/edema   ALLERGIC/IMMUNOLOGIC:  negative for urticaria , itching  ENDOCRINE:  negative increase in drinking, increase in urination, hot or cold intolerance  MUSCULOSKELETAL:  negative joint pains, muscle aches, swelling of joints  NEUROLOGICAL:  negative for headaches, dizziness, lightheadedness, numbness, pain, tingling extremities  BEHAVIOR/PSYCH:  negative for depression, anxiety    Physical Exam:     /77   Pulse 100 Temp 99 °F (37.2 °C)   Resp 16   Ht 5' 1\" (1.549 m)   SpO2 96%   BMI 26.83 kg/m²   Temp (24hrs), Av.5 °F (36.9 °C), Min:98 °F (36.7 °C), Max:99 °F (37.2 °C)    Recent Labs     22  1121   POCGLU 130*       Intake/Output Summary (Last 24 hours) at 2022 2336  Last data filed at 2022 0700  Gross per 24 hour   Intake --   Output 700 ml   Net -700 ml       General Appearance:  alert, well appearing, and in no acute distress  Mental status: oriented to person, place, and time with normal affect  Head:  normocephalic, atraumatic. Eye: no icterus, redness, pupils equal and reactive, extraocular eye movements intact, conjunctiva clear  Ear: normal external ear, no discharge, hearing intact  Nose:  no drainage noted  Mouth: mucous membranes moist  Neck: supple, no carotid bruits, thyroid not palpable  Lungs: Bilateral equal air entry, clear to ausculation, no wheezing, rales or rhonchi, normal effort  Cardiovascular: normal rate, regular rhythm, no murmur, gallop, rub.   Abdomen: Soft, nontender, nondistended, normal bowel sounds, no hepatomegaly or splenomegaly  Neurologic: There are no new focal motor or sensory deficits, normal muscle tone and bulk, no abnormal sensation, normal speech, cranial nerves II through XII grossly intact  Skin: No gross lesions, rashes, bruising or bleeding on exposed skin area  Extremities:  peripheral pulses palpable, no pedal edema or calf pain with palpation  Psych: normal affect    Investigations:      Laboratory Testing:  Recent Results (from the past 24 hour(s))   Basic Metabolic Panel w/ Reflex to MG    Collection Time: 22  7:30 AM   Result Value Ref Range    Glucose 95 70 - 99 mg/dL    BUN 8 8 - 23 mg/dL    CREATININE 0.54 0.50 - 0.90 mg/dL    Calcium 8.9 8.6 - 10.4 mg/dL    Sodium 133 (L) 135 - 144 mmol/L    Potassium 3.8 3.7 - 5.3 mmol/L    Chloride 97 (L) 98 - 107 mmol/L    CO2 24 20 - 31 mmol/L    Anion Gap 12 9 - 17 mmol/L    GFR Non-African American >60 >60 mL/min    GFR African American >60 >60 mL/min    GFR Comment         CBC auto differential    Collection Time: 04/16/22  7:30 AM   Result Value Ref Range    WBC 8.6 3.5 - 11.0 k/uL    RBC 3.34 (L) 4.0 - 5.2 m/uL    Hemoglobin 9.4 (L) 12.0 - 16.0 g/dL    Hematocrit 28.9 (L) 36 - 46 %    MCV 86.6 80 - 100 fL    MCH 28.1 26 - 34 pg    MCHC 32.4 31 - 37 g/dL    RDW 18.4 (H) 11.5 - 14.9 %    Platelets 412 346 - 119 k/uL    MPV 7.2 6.0 - 12.0 fL    Seg Neutrophils 78 (H) 36 - 66 %    Lymphocytes 13 (L) 24 - 44 %    Monocytes 7 1 - 7 %    Eosinophils % 1 0 - 4 %    Basophils 1 0 - 2 %    Segs Absolute 6.80 1.3 - 9.1 k/uL    Absolute Lymph # 1.10 1.0 - 4.8 k/uL    Absolute Mono # 0.60 0.1 - 1.3 k/uL    Absolute Eos # 0.10 0.0 - 0.4 k/uL    Basophils Absolute 0.00 0.0 - 0.2 k/uL       Imaging/Diagonstics:  XR CERVICAL SPINE (2-3 VIEWS)    Result Date: 4/13/2022  Expected postoperative findings status post anterior and posterior cervical fusion. XR CHEST PORTABLE    Result Date: 4/13/2022  No acute cardiopulmonary disease. XR CERVICAL SPINE FLEXION AND EXTENSION    Result Date: 4/13/2022  1. Retrolisthesis of C2 on C3 measures 2 mm on extension view and reduces on flexion view. 2. No evidence of instability of anterolisthesis of C3 on C4 measuring 1.5 mm. 3. No acute fracture on limited views. Of note, the C6-C7 and C7-T1 levels are not well seen due to overlying soft tissues. 4. Severe multilevel degenerative changes. CTA NECK W CONTRAST    Result Date: 4/12/2022  Unremarkable CTA of the neck. Minor hazy pleural thickening and chronic-appearing medial right upper lobe atelectasis/scarring. If there is any clinical concern, follow-up noncontrast chest CT may be useful.  RECOMMENDATIONS: Unavailable       Assessment :      Hospital Problems           Last Modified POA    * (Principal) Spinal cord injury, cervical region, sequela ([address] 4/15/2022 Yes    Essential hypertension 4/16/2022 Yes    Hypothyroidism 4/16/2022 Yes    Post-menopausal osteoporosis 4/16/2022 Yes    Cervical myelopathy ([facility] 4/16/2022 Yes    Anemia, normocytic normochromic 4/16/2022 Yes          Plan:     1. Spinal cord injury status post anterior C5 corpectomy, posterior fixation from C2-T1 with arthrodesis, osteotomy,  2. Essential hypertension, continue monitor blood pressure, continue home medications, added lisinopril, diltiazem,  3. Hypothyroidism, continue levothyroxine,  4. Continue physical therapy,  5. DVT prophylaxis    Consultations:   Oscar Collier TO INTERNAL MEDICINE      Sy Barajas MD  4/16/2022  11:36 PM    Copy sent to Dr. Skyla Melo MD    Please note that this chart was generated using voice recognition Dragon dictation software. Although every effort was made to ensure the accuracy of this automated transcription, some errors in transcription may have occurred.

## 2022-04-17 NOTE — PLAN OF CARE
Problem: Skin Integrity:  Goal: Will show no infection signs and symptoms  Description: Will show no infection signs and symptoms  Outcome: Ongoing  Goal: Absence of new skin breakdown  Description: Absence of new skin breakdown  Outcome: Ongoing     Problem: Falls - Risk of:  Goal: Will remain free from falls  Description: Will remain free from falls  Outcome: Ongoing  Goal: Absence of physical injury  Description: Absence of physical injury  Outcome: Ongoing     Problem: Discharge Planning:  Goal: Discharged to appropriate level of care  Description: Discharged to appropriate level of care  Outcome: Ongoing     Problem: Pain:  Goal: Pain level will decrease  Description: Pain level will decrease  Outcome: Ongoing  Goal: Control of acute pain  Description: Control of acute pain  Outcome: Ongoing  Goal: Control of chronic pain  Description: Control of chronic pain  Outcome: Ongoing     Problem: Nutrition  Goal: Optimal nutrition therapy  Outcome: Ongoing     Problem: Musculor/Skeletal Functional Status  Goal: Highest potential functional level  Outcome: Ongoing  Goal: Absence of falls  Outcome: Ongoing

## 2022-04-18 PROCEDURE — 6370000000 HC RX 637 (ALT 250 FOR IP): Performed by: REGISTERED NURSE

## 2022-04-18 PROCEDURE — 99232 SBSQ HOSP IP/OBS MODERATE 35: CPT | Performed by: PHYSICAL MEDICINE & REHABILITATION

## 2022-04-18 PROCEDURE — 97530 THERAPEUTIC ACTIVITIES: CPT

## 2022-04-18 PROCEDURE — 6370000000 HC RX 637 (ALT 250 FOR IP): Performed by: PHYSICAL MEDICINE & REHABILITATION

## 2022-04-18 PROCEDURE — 97110 THERAPEUTIC EXERCISES: CPT

## 2022-04-18 PROCEDURE — 97535 SELF CARE MNGMENT TRAINING: CPT

## 2022-04-18 PROCEDURE — 97116 GAIT TRAINING THERAPY: CPT

## 2022-04-18 PROCEDURE — 1180000000 HC REHAB R&B

## 2022-04-18 PROCEDURE — 6360000002 HC RX W HCPCS: Performed by: REGISTERED NURSE

## 2022-04-18 PROCEDURE — 99232 SBSQ HOSP IP/OBS MODERATE 35: CPT | Performed by: INTERNAL MEDICINE

## 2022-04-18 PROCEDURE — 6370000000 HC RX 637 (ALT 250 FOR IP): Performed by: INTERNAL MEDICINE

## 2022-04-18 RX ORDER — BACLOFEN 10 MG/1
10 TABLET ORAL 4 TIMES DAILY
Status: DISCONTINUED | OUTPATIENT
Start: 2022-04-18 | End: 2022-04-29 | Stop reason: HOSPADM

## 2022-04-18 RX ADMIN — OXYCODONE HYDROCHLORIDE 10 MG: 10 TABLET ORAL at 22:24

## 2022-04-18 RX ADMIN — METHADONE HYDROCHLORIDE 10 MG: 10 TABLET ORAL at 08:43

## 2022-04-18 RX ADMIN — DOCUSATE SODIUM 200 MG: 100 CAPSULE, LIQUID FILLED ORAL at 08:41

## 2022-04-18 RX ADMIN — ACETAMINOPHEN 650 MG: 325 TABLET ORAL at 06:14

## 2022-04-18 RX ADMIN — ACETAMINOPHEN 650 MG: 325 TABLET ORAL at 22:24

## 2022-04-18 RX ADMIN — METHADONE HYDROCHLORIDE 10 MG: 10 TABLET ORAL at 22:24

## 2022-04-18 RX ADMIN — MAGNESIUM HYDROXIDE 30 ML: 400 SUSPENSION ORAL at 17:12

## 2022-04-18 RX ADMIN — DOCUSATE SODIUM 200 MG: 100 CAPSULE, LIQUID FILLED ORAL at 22:24

## 2022-04-18 RX ADMIN — BACLOFEN 10 MG: 10 TABLET ORAL at 17:12

## 2022-04-18 RX ADMIN — BACLOFEN 10 MG: 10 TABLET ORAL at 22:24

## 2022-04-18 RX ADMIN — OXYCODONE HYDROCHLORIDE 10 MG: 10 TABLET ORAL at 17:12

## 2022-04-18 RX ADMIN — OXYCODONE HYDROCHLORIDE 10 MG: 10 TABLET ORAL at 08:42

## 2022-04-18 RX ADMIN — POLYMYXIN B SULFATE, BACITRACIN ZINC, NEOMYCIN SULFATE: 5000; 3.5; 4 OINTMENT TOPICAL at 22:25

## 2022-04-18 RX ADMIN — ACETAMINOPHEN 650 MG: 325 TABLET ORAL at 12:36

## 2022-04-18 RX ADMIN — BUSPIRONE HYDROCHLORIDE 30 MG: 15 TABLET ORAL at 22:25

## 2022-04-18 RX ADMIN — FERROUS SULFATE TAB 325 MG (65 MG ELEMENTAL FE) 325 MG: 325 (65 FE) TAB at 17:12

## 2022-04-18 RX ADMIN — DILTIAZEM HYDROCHLORIDE 180 MG: 180 CAPSULE, COATED, EXTENDED RELEASE ORAL at 08:44

## 2022-04-18 RX ADMIN — POLYETHYLENE GLYCOL 3350 17 G: 17 POWDER, FOR SOLUTION ORAL at 08:46

## 2022-04-18 RX ADMIN — OXYCODONE HYDROCHLORIDE 10 MG: 10 TABLET ORAL at 12:38

## 2022-04-18 RX ADMIN — PANTOPRAZOLE SODIUM 40 MG: 40 TABLET, DELAYED RELEASE ORAL at 08:52

## 2022-04-18 RX ADMIN — BUSPIRONE HYDROCHLORIDE 30 MG: 15 TABLET ORAL at 08:45

## 2022-04-18 RX ADMIN — FERROUS SULFATE TAB 325 MG (65 MG ELEMENTAL FE) 325 MG: 325 (65 FE) TAB at 08:45

## 2022-04-18 RX ADMIN — LEVOTHYROXINE SODIUM 88 MCG: 0.09 TABLET ORAL at 06:15

## 2022-04-18 RX ADMIN — BACLOFEN 10 MG: 10 TABLET ORAL at 08:41

## 2022-04-18 RX ADMIN — LISINOPRIL 5 MG: 5 TABLET ORAL at 08:41

## 2022-04-18 RX ADMIN — POLYMYXIN B SULFATE, BACITRACIN ZINC, NEOMYCIN SULFATE: 5000; 3.5; 4 OINTMENT TOPICAL at 08:52

## 2022-04-18 RX ADMIN — ENOXAPARIN SODIUM 40 MG: 100 INJECTION SUBCUTANEOUS at 08:44

## 2022-04-18 RX ADMIN — ACETAMINOPHEN 650 MG: 325 TABLET ORAL at 17:12

## 2022-04-18 ASSESSMENT — PAIN SCALES - GENERAL
PAINLEVEL_OUTOF10: 10
PAINLEVEL_OUTOF10: 9
PAINLEVEL_OUTOF10: 8
PAINLEVEL_OUTOF10: 10
PAINLEVEL_OUTOF10: 3
PAINLEVEL_OUTOF10: 10

## 2022-04-18 ASSESSMENT — PAIN DESCRIPTION - ORIENTATION: ORIENTATION: POSTERIOR

## 2022-04-18 ASSESSMENT — PAIN DESCRIPTION - PAIN TYPE
TYPE: ACUTE PAIN;SURGICAL PAIN
TYPE: ACUTE PAIN;SURGICAL PAIN

## 2022-04-18 ASSESSMENT — PAIN DESCRIPTION - LOCATION
LOCATION: NECK
LOCATION: NECK

## 2022-04-18 NOTE — CARE COORDINATION
CASE MANAGEMENT NOTE:    Admission Date:  4/15/2022 Jonathan Cortes is a 62 y.o.  female    Admitted for : Spinal cord injury, cervical region, sequela ([facility] [S14.109S]    Met with:  Patient    PCP: Mauri Strickland MD                              Insurance:  MEDICARE/MEDICARE PART A AND B    Is patient alert and oriented at time of discussion:  Yes    Current Residence/ Living Arrangements:  independently at home           Does patient have 24 hour assistance at home:  family and/or caregiver wiling & able to provide support at home    Current Services PTA:  N/A    Does patient go to outpatient dialysis: No  If yes, location and chair time: no    Is patient agreeable to VNS/Outpatient therapy undecided    Freedom of choice provided:  Yes    List of Home Care Agencies/Outpatient therapy provided: No    VNS/Outpatient therapy chosen:  Yes    DME:  Rolator, wheelchair, Shower Seat    Home Oxygen: No    Nebulizer: No    CPAP/BIPAP: No    Supplier: N/A    Handicap Placard: yes    Potential Assistance Needed:yes    Pharmacy:  [facility]    Does Patient want to use MEDS to BEDS? No    Is patient currently receiving oral anticoagulation therapy? No    Family Members/Caregivers that pt would like involved in their care:    Yes    If yes, list name here: Rosalia Simpson: Family         Discharge Plan:  Home/ 2 story home uses/ 1st floor w  and grandson DME: has Rolator, wheelchair, Shower Seat  VNS: will wait for recommendation.           Electronically signed by: Felecia Riley RN on 4/18/2022 at 8:30 AM

## 2022-04-18 NOTE — CARE COORDINATION
Cinthia Urena RN   Registered Nurse   Case Management   Progress Notes      Signed   Date of Service:  4/15/2022  2:18 PM         Related encounter: Admission (Discharged) from 2022 in [address]  Acute Inpatient Rehab Preadmission Assessment     Patient Name: Maya Nevarez        MRN:   [id number]    : 1960  (62 y.o.)  Gender: female      Admitted from:   []?[facility]  [x]? [facility] [address]   []? [facility]   []? [facility]   []? Outside Admission - Location:                                 [x]? Initial         []? Updated     Date of Onset / Admission to the acute hospital:  22     Inpatient Rehabilitation Admitting Diagnosis:  Nontraumatic cervical spinal cord injury        Did patient have surgery/procedures? []? No  [x]? Yes:       22 Procedure     Part 1  C5 corpectomy and anterior arthrodesis of C4 and C6 endplates  Implantation of titanium expandable [employer] cage  Open reduction of cervical kyphotic deformity  Anterior fixation with titanium plate and screws  Use of operative microscope  Use of fluoroscopy  Use of morselized autograft via same incision  Use of morselized allograft  Use of intraoperative cervical traction utilizing [employer] tongs and 15 pounds traction weight     Part 2  Posterior nonsegmental fixation with pedicle screws at C2, lateral mass screws at C3, C4, C5, C6, C7  Posterior lateral arthrodesis at C2, C3, C4, C5, C6, C7  Laminectomy at C3 and partial C7  Use of morselized autograft via separate incision  Use of morselized allograft  Lysis of adhesions and epidural scar  Primary microsurgical repair of durotomy  22 modifier  Use of spinal neuro navigation  Use of fluoroscopy  Use of operative microscope  Use of neuro monitoring  Use of intraoperative cervical traction utilizing [employer]s tongs and 15 pounds traction weight     Physicians:  Ricarda Andrews     Risk for clinical complications: High     Co-morbidities:       1. cervical stenosis   2. History of cauda equina syndrome s/p L2-S1 laminectomy Dec 21  3. Anemia  4. Hyponatremia  5. HTN  6. Hypothyroidism  7. GERD  8. History of breast cancer  9. Anxiety        Financial Information  Primary insurance:  [x]? Medicare     []? Medicare HMO      []? [employer]    []? Medicaid      []? Medicaid HMO       []? Workers Compensation        []? Personal Pay     Secondary Insurance:  []? Medicare     []? Medicare HMO      []? [employer]    []? Medicaid      []? Medicaid HMO        []? Workers Compensation      [x]? None     Precautions:   []? Cardiac Precautions:            []? Total hip precautions:           []? Weight Bearing status:  [x]? Safety Precautions/Concerns:  Fall Risk, General Precautions, Cervical collar on while out of bed-ok to remove while eating, drinking and resting in bed  [x]? Visually impaired:  Wears glasses for reading         []? Hard of Hearing:      Isolation Precautions:         []? Yes              [x]? No  If Yes:   []? Droplet  []? Contact           []? Airborne     []? VRE     []? MRSA        []? C-diff         []? TB             []? ESBL         []? MDRO          []? Other:                        Physiatrist:  []? Dr. Kely Suh     []? Dr. Cabrera   [x]? Dr. Radha Simms  []? Dr. Roxann Gallagher     Patients Occupation: Disabled     Reviewed Lab and Diagnostic reports from Current Admission: Yes     Patients Prior Functional  Level: Prior Function  ADL Assistance: Needs assistance (Partner and grandson assist with bathing, dressing, grooming, toileting)  Homemaking Assistance: Needs assistance  Ambulation Assistance: Needs assistance (wheelchair)  Transfer Assistance: Needs assistance (pt grandson and partner assist)  Additional Comments: Pt reports needing max assist for all bathing, dressing, toileting, and self-care ADLs for approximately 2 weeks prior to admission; pt needed some assistance with ADLs >2 weeks ago.  Pt reports sponge-bathing in bathroom at baseline, and sleeping in a recliner chair on the first floor at baseline. Pt now uses w/c for all func mob in community and around house for ADL copmletion, receiving assist from partner and grandson for mobility and transfers; pt reports using RW and standard walker >2 weeks ago for func mob. Pt partner works full time days and grandson is currently in online school.     History of current illness, per PM&R Consult:  Yenny Hall a 62 y.o. female with history of cauda equina syndrome s/p L2-S1 laminectomy (12/2021), HTN, hypothyroidism, GERD, breast cancer, and anxiety admitted to [facility] 4/11/2022.       She initially presented for planned surgical intervention for cervical stenosis.  She underwent C5 corpectomy with C4-C6 arthrodesis, open reduction of cervical kyphotic deformity, and C2-C7 posterior fusion on 4/12/22 (Dr. Yordan Webb). Caro Thomason has a cervical collar when out of bed.     Upon evaluation, she is fatigued and drowsy but arousable.  She reports neck pain as well as ongoing numbness/tingling and weakness of the limbs.  She denies any changes in bowel or bladder control.  She states that she was doing pretty well after her lumbosacral surgery in 12/2021 but then she had a functional decline.  She is unable to provide any additional history due to drowsiness.     Prognosis: Fair     Current functional status for upper extremity ADLs:  UE Bathing: Moderate assistance,Verbal cueing,Setup,Increased time to complete  UE Dressing: Moderate assistance     Current functional status for lower extremity ADLs:  LE Bathing: Maximum assistance,Verbal cueing,Setup,Increased time to complete  LE Dressing: Maximum assistance,Verbal cueing,Setup,Increased time to complete     Current functional status for bed, chair, wheelchair transfers:  Transfers  Sit to Stand:  Moderate Assistance  Stand to sit: Moderate Assistance  Comment: with encouragement pt able to stand with assist and use of walker Pt completed 3 sit <> stand     Current functional status for toilet transfers: Moderate Assistance     Current functional status for locomotion:  Ambulation  Ambulation?: Yes  More Ambulation?: No  Ambulation 1  Surface: level tile  Device: Rolling Walker  Assistance: Minimal assistance  Quality of Gait: Pt is guarded and apprehensive to stand, tolerated standing x 6 minutes with focus on tolerance and weighte shifts progressive activity with weight shifts needed to progress steps  Distance: Pt able to take 2-3 side step left and right at side of med with RW, VC for foot placement and weight shifting, Mod A needed     Current functional status for comprehension: Complete independence     Current functional status for expression: Complete independence     Current functional status for social interaction: Complete independence     Current functional status for problem solving: Complete independence     Current functional status for memory: Complete independence     Current Deficits R/T Impairment: Impaired Functional Mobility and Decreased ADLs     Required Therapy:   [x]? Physical Therapy  [x]? Occupational Therapy   []? Speech Therapy, as appropriate     Additional Services:    [x]?     []? Recreational Therapy, as appropriate    [x]? Nutrition    []? Dialysis  []? Cultural Needs Identified  []? Special Equipment Needs  []? Other     Rehab Justification:  Needs 3 hrs therapy per day or 15 hours per week:  Yes  Identified Rehab Nursing needs: Yes  Intense Interdisciplinary need:  Yes  Need for 24 hr physician supervision:  Yes  Measurable improved quality of life:  Yes  Willingness to participate:  Yes  Medical Necessity:  Yes  Patient able to tolerate care proposed:   Yes     Expected Discharge Destination/Functional Level:  Home with assist  Expected length of time to achieve that level of improvement: 1-2 weeks  Expected Post Discharge Treatments: Home with possible Home Care     Other information relevant to patient's care needs:  N/A     Acute Inpatient Rehabilitation Disclosure Statement will be provided to patient upon admission to ARU with patient's verbalization of understanding.       I have reviewed and concur with the findings and results of the pre-admission screening assessment completed by the Inpatient Rehabilitation Admissions Coordinator.                  Cosigned by: April Rivas MD at 4/15/2022  3:04 PM

## 2022-04-18 NOTE — PROGRESS NOTES
Comprehensive Nutrition Assessment    Type and Reason for Visit:  Reassess    Nutrition Recommendations/Plan: Will continue Regular diet and add Magic Cup twice daily    Nutrition Assessment:  Pt with large variations in po intake %. Recent constipation noted. Malnutrition Assessment:  Malnutrition Status: At risk for malnutrition (Comment)    Context:  Acute Illness     Findings of the 6 clinical characteristics of malnutrition:  Energy Intake:  Mild decrease in energy intake (Comment)  Weight Loss:  No significant weight loss     Body Fat Loss:  No significant body fat loss     Muscle Mass Loss:  No significant muscle mass loss    Fluid Accumulation:  1 - Mild Generalized (not nutrition related)   Strength:  Not Performed    Estimated Daily Nutrient Needs:  Energy (kcal):  5012-4575 kcals based on [place] with 1.2-1.3 factor using adm wt 64.4 kg; Weight Used for Energy Requirements:  Admission     Protein (g):  67-76 gm protein based on 1.4-1.6 gm/kg using IBW; Weight Used for Protein Requirements:  Ideal          Nutrition Related Findings:  mild edema generalized/BLE, Labs: Reviewed, Meds: Synthroid, BM 4/16      Wounds:  Multiple,Skin Tears,Surgical Incision       Current Nutrition Therapies:    ADULT DIET;  Regular    Anthropometric Measures:  · Height: 5' 1\" (154.9 cm)  · Current Body Weight: 142 lb (64.4 kg)   · Admission Body Weight: 142 lb (64.4 kg)      Nutrition Diagnosis:   · Inadequate oral intake related to altered GI function as evidenced by constipation,intake 26-50%    Nutrition Interventions:   Food and/or Nutrient Delivery:  Continue Current Diet,Start Oral Nutrition Supplement  Nutrition Education/Counseling:  No recommendation at this time   Coordination of Nutrition Care:  Continue to monitor while inpatient    Goals:  PO intake % of meals with good tolerance       Nutrition Monitoring and Evaluation:   Food/Nutrient Intake Outcomes:  Food and Nutrient Intake,Supplement Intake  Physical Signs/Symptoms Outcomes:  Biochemical Data,Fluid Status or Edema,Constipation,Skin     Discharge Planning:    Continue current diet     Electronically signed by Eric Matthews RD, LD on 4/18/22 at 3:50 PM EDT    Contact: [phone]

## 2022-04-18 NOTE — CONSULTS
GLORIA [facility] Internal Medicine  Maggie Smith MD; Amara Beltran MD; George Valencia MD; MD Shamika Galarza MD; MD [facility]   [address] / HISTORY AND PHYSICAL EXAMINATION            Date:   4/17/2022  Patient name:  Marta Galarza  Date of admission:  4/15/2022  5:32 PM  MRN:   [id number]  Account:  [de-identified]  YOB: 1960  PCP:    Alina Merritt MD  Room:   [address] [place] [address]  Code Status:    Full Code    Physician Requesting Consult: Gayle Parker MD    Reason for Consult: Medical management    Chief Complaint:     No chief complaint on file. Underwent anterior C5 corpectomy, posterior fixation from C2-T1 with arthrodesis, osteotomy, correction of deformity on April 12    History Obtained From:     patient    History of Present Illness: The patient is a 61 y.o. female presented for elective surgery. Underwent anterior C5 corpectomy, posterior fixation from C2-T1 with arthrodesis, osteotomy, correction of deformity on 4/12 with Dr. Brice Michaud 4/12. Symptoms include significant left-sided paresis, severe lower extremity weakness (wheelchair bound, now unable to stand or ambulate [id number]), progressively worsening paresthesias and dexterity issues with left upper extremity and left lower extremity.  Additionally complains of some moderate saddle anesthesia, urge incontinence but no bowel dysfunction.   Admitted to acute rehab for further management for deconditioning,        Past Medical History:     Past Medical History:   Diagnosis Date    Anxiety     Arthritis     At maximum risk for fall 12/29/2021    caudal equina syndrome and cervical stenosis    Cancer ([address]     right breast    Cauda equina syndrome ([address] 12/29/2021    neuropathy, mobility difficulty, incontinance    Cervical stenosis of spine 12/29/2021    GERD (gastroesophageal reflux disease)     History of stomach ulcers     Hypertension     Dr. Brandi Watts    Hypothyroidism     Lumbar neuritis     Post laminectomy syndrome     Spinal deformity 11/2021    cervical and lumbar    Thyroid disease     Uses wheelchair     Wears dentures     Wellness examination     Dr. Brandi Watts        Past Surgical History:     Past Surgical History:   Procedure Laterality Date    BACK SURGERY      lower back x 3, cervical- x 1: C4- C6, 11/4/2014     BREAST SURGERY Right     mastectomy with reconstruction    CERVICAL FUSION N/A 4/12/2022    C5 CORPECTOMY, USE OF INTRAOPERATIVE CERVICAL TRACTION performed by Amanda Chávez DO at [address] [place] [address] N/A 4/12/2022    POSTERIOR FIXATION C2-C7 performed by Amanda Chávez DO at [place] [address]  about 2018    HERNIA REPAIR Bilateral     inguinal    HYSTERECTOMY, TOTAL ABDOMINAL      LUMBAR SPINE SURGERY N/A 12/30/2021    LUMBAR LAMINECTOMY L2-S1 performed by Amanda [facility]ávez DO at [facility], hospitals      OTHER SURGICAL HISTORY  04/12/2022    C5 CORPECTOMY, USE OF INTRAOPERATIVE CERVICAL TRACTION (N/A Spine Cervical)         Medications Prior to Admission:     Prior to Admission medications    Medication Sig Start Date End Date Taking?  Authorizing Provider   dilTIAZem (DILACOR XR) 180 MG extended release capsule TAKE ONE CAPSULE BY MOUTH DAILY 4/1/22   Ana Laura Finnegan MD   ferrous sulfate (IRON 325) 325 (65 Fe) MG tablet Take 1 tablet by mouth 2 times daily (with meals) 1/13/22   Amber Holland MD   docusate sodium (COLACE) 100 MG capsule Take 2 capsules by mouth 2 times daily 1/13/22   Amber Holland MD   pantoprazole (PROTONIX) 40 MG tablet TAKE ONE TABLET BY MOUTH DAILY 12/20/21   Alexsander Mederos MD   busPIRone (BUSPAR) 30 MG tablet Take 30 mg by mouth 2 times daily 12/17/21   Alexsander Mederos MD   levothyroxine (SYNTHROID) 88 MCG tablet Take 1 tablet by mouth Daily 12/17/21 3/18/22  Alexsander Mederos MD   tiZANidine (ZANAFLEX) 4 MG tablet Take 4 mg by mouth every 12 hours    Historical Provider, MD   Vitamin D, Ergocalciferol, 50 MCG (2000 UT) CAPS TAKE ONE CAPSULE BY MOUTH DAILY 3/29/21   Elisabet Patten PA-C   HYDROcodone-acetaminophen [facility]) 7.5-325 MG per tablet Up to three tablets a day. 10/21/15   Historical Provider, MD   methadone (DOLOPHINE) 10 MG tablet Take 10 mg by mouth 2 times daily. Historical Provider, MD        Allergies:     Latex and Kiwi extract    Social History:     Tobacco:    reports that she quit smoking about 8 years ago. She has a 15.00 pack-year smoking history. She has never used smokeless tobacco.  Alcohol:      reports current alcohol use. Drug Use:  reports no history of drug use. Family History:     Family History   Problem Relation Age of Onset    Hypertension Mother     Heart Disease Mother     Cancer Mother        Review of Systems:     Positive and Negative as described in HPI. CONSTITUTIONAL:  negative for fevers, chills, sweats, fatigue, weight loss  HEENT:  negative for vision, hearing changes, runny nose, throat pain  RESPIRATORY:  negative for shortness of breath, cough, congestion, wheezing. CARDIOVASCULAR:  negative for chest pain, palpitations.   GASTROINTESTINAL:  negative for nausea, vomiting, diarrhea, constipation, change in bowel habits, abdominal pain   GENITOURINARY:  negative for difficulty of urination, burning with urination, frequency   INTEGUMENT:  negative for rash, skin lesions, easy bruising   HEMATOLOGIC/LYMPHATIC:  negative for swelling/edema   ALLERGIC/IMMUNOLOGIC:  negative for urticaria , itching  ENDOCRINE:  negative increase in drinking, increase in urination, hot or cold intolerance  MUSCULOSKELETAL:  negative joint pains, muscle aches, swelling of joints  NEUROLOGICAL:  negative for headaches, dizziness, lightheadedness, numbness, pain, tingling extremities  BEHAVIOR/PSYCH:  negative for depression, anxiety    Physical Exam:     BP (!) 151/75   Pulse 99 Temp 99 °F (37.2 °C) (Oral)   Resp 18   Ht 5' 1\" (1.549 m)   SpO2 96%   BMI 26.83 kg/m²   Temp (24hrs), Av.9 °F (37.2 °C), Min:98.8 °F (37.1 °C), Max:99 °F (37.2 °C)    No results for input(s): POCGLU in the last 72 hours. Intake/Output Summary (Last 24 hours) at 20220  Last data filed at 2022 0800  Gross per 24 hour   Intake 240 ml   Output --   Net 240 ml       General Appearance:  alert, well appearing, and in no acute distress  Mental status: oriented to person, place, and time with normal affect  Head:  normocephalic, atraumatic. Eye: no icterus, redness, pupils equal and reactive, extraocular eye movements intact, conjunctiva clear  Ear: normal external ear, no discharge, hearing intact  Nose:  no drainage noted  Mouth: mucous membranes moist  Neck: supple, no carotid bruits, thyroid not palpable  Lungs: Bilateral equal air entry, clear to ausculation, no wheezing, rales or rhonchi, normal effort  Cardiovascular: normal rate, regular rhythm, no murmur, gallop, rub. Abdomen: Soft, nontender, nondistended, normal bowel sounds, no hepatomegaly or splenomegaly  Neurologic: There are no new focal motor or sensory deficits, normal muscle tone and bulk, no abnormal sensation, normal speech, cranial nerves II through XII grossly intact  Skin: No gross lesions, rashes, bruising or bleeding on exposed skin area  Extremities:  peripheral pulses palpable, no pedal edema or calf pain with palpation  Psych: normal affect    Investigations:      Laboratory Testing:  No results found for this or any previous visit (from the past 24 hour(s)). Imaging/Diagonstics:  XR CERVICAL SPINE (2-3 VIEWS)    Result Date: 2022  Expected postoperative findings status post anterior and posterior cervical fusion. XR CHEST PORTABLE    Result Date: 2022  No acute cardiopulmonary disease. XR CERVICAL SPINE FLEXION AND EXTENSION    Result Date: 2022  1.  Retrolisthesis of C2 on C3 measures 2 mm on extension view and reduces on flexion view. 2. No evidence of instability of anterolisthesis of C3 on C4 measuring 1.5 mm. 3. No acute fracture on limited views. Of note, the C6-C7 and C7-T1 levels are not well seen due to overlying soft tissues. 4. Severe multilevel degenerative changes. CTA NECK W CONTRAST    Result Date: 4/12/2022  Unremarkable CTA of the neck. Minor hazy pleural thickening and chronic-appearing medial right upper lobe atelectasis/scarring. If there is any clinical concern, follow-up noncontrast chest CT may be useful. RECOMMENDATIONS: Unavailable       Assessment :      Hospital Problems           Last Modified POA    * (Principal) Spinal cord injury, cervical region, sequela ([facility] 4/15/2022 Yes    Essential hypertension 4/16/2022 Yes    Hypothyroidism 4/16/2022 Yes    Post-menopausal osteoporosis 4/16/2022 Yes    Cervical myelopathy ([facility] 4/16/2022 Yes    Anemia, normocytic normochromic 4/16/2022 Yes          Plan:     1. Spinal cord injury status post anterior C5 corpectomy, posterior fixation from C2-T1 with arthrodesis, osteotomy,  2. Essential hypertension, continue monitor blood pressure, continue home medications, added lisinopril, diltiazem,  3. Hyponatremia, better now ,  4. Constipation , prn meds   5. Hypothyroidism, continue levothyroxine,  6. Continue physical therapy,  7. DVT prophylaxis    Consultations:   Alex aBssett  IP CONSULT TO SOCIAL WORK  IP CONSULT TO INTERNAL MEDICINE      Jordon Rodriguez MD  4/17/2022  10:20 PM    Copy sent to Dr. Hima Hoskins MD    Please note that this chart was generated using voice recognition Dragon dictation software. Although every effort was made to ensure the accuracy of this automated transcription, some errors in transcription may have occurred.

## 2022-04-18 NOTE — CARE COORDINATION
Patient Health Questionnaire-9 (PHQ-9)    Over the last 2 weeks, how often have you been bothered by any of the following problems? 1. Little Interest or pleasure in doing things? [x] Not at all  [] Several Days  [] More than half the day  []  Nearly every day    2. Feeling down, depressed or hopeless? [x] Not at all  [] Several Days  [] More than half the day  []  Nearly every day    3. Trouble falling or staying asleep, or sleeping too much? [x] Not at all  [] Several Days  [] More than half the day  []  Nearly every day    4. Feeling tired or having little energy? [x] Not at all  [] Several Days  [] More than half the day  []  Nearly every day    5. Poor apettite or overeating? [] Not at all  [] Several Days  [] More than half the day  []  Nearly every day    6. Feeling bad about yourself-or that you are a failure or have let yourself or your family down? [x] Not at all  [] Several Days  [] More than half the day  []  Nearly every day    7. Trouble concentrating on things, such as reading the newspaper or watching television? [x] Not at all  [] Several Days  [] More than half the day  []  Nearly every day    8. Moving or speaking so slowly that other people could have noticed? Or the opposite-being so fidgety or restless that you have been moving around a lot more than usual?   [x] Not at all  [] Several Days  [] More than half the day  []  Nearly every day    9. Thoughts that you would be better off dead or of hurting yourself in some way? [x] Not at all  [] Several Days  [] More than half the day  []  Nearly every day    Total Score:0    If you checked off any problems, how difficult have these problems made it for you to do your work, take care of things at home, or get along with other people?    [x] Not difficult at all  [] Somewhat Difficult  [] Very Difficult  []  Extremely Difficult

## 2022-04-18 NOTE — PROGRESS NOTES
[address] [facility]    ACUTE REHABILITATION OCCUPATIONAL THERAPY  DAILY NOTE    Date: 22  Patient Name: Riccardo Pablo      Room: 1691/8531-43    MRN: [id number]   : 1960  (62 y.o.)  Gender: female   Referring Practitioner: Gilberto Menjivar MD  Diagnosis: Nontraumatic cervical spinal cord injury       Restrictions  Restrictions/Precautions: Fall Risk,General Precautions  Implants present? :  (Lumbar/neck surgery)  Other position/activity restrictions: ambulate pt; s/p C2-C7 fixation  Required Braces or Orthoses  Cervical: c-collar (collar on while out of bed)  Required Braces or Orthoses?: Yes  Equipment Used: Bed,Wheelchair    Subjective  Subjective: \"I hope I can get in the shower today. \"  Comments: Pt pleasant and agreeable to OT; per Dr. Foster Gutiérrez, pt okay to shower with c-collar on   Patient Currently in Pain: Yes  Pain Level: 3  Pain Location: Neck  Restrictions/Precautions: Fall Risk;General Precautions  Overall Orientation Status: Within Functional Limits     Pain Assessment  Pain Assessment: 0-10  Pain Level: 3  Pain Type: Acute pain,Surgical pain  Pain Location: Neck    Objective     Balance  Sitting Balance: Stand by assistance  Standing Balance: Minimal assistance  Transfers  Stand Pivot Transfers: Minimal assistance  Sit to stand: Minimal assistance  Stand to sit: Minimal assistance  Transfer Comments: Pt min A for transfers this date with verbal cues required for hand/foot placement during transfers for safety. Standing Balance  Time: AM: 1 min x3; PM: pt able to stand for 1:32 while completing [facility] activity of placing golf tees into holes in wheel. Pt with slight posterior LOB, requiring Min A to recover. Pt required verbal cues to lock L knee.    Activity: functional transfers, lower body dressing/bathing, toileting; PM: fine motor coordination/standing tolerance activity  Comment: 1-2 UE support  Functional Mobility  Functional - Mobility Device: Wheelchair  Activity: To/from bathroom  Assist Level: Dependent/Total  Functional Mobility Comments: did not self propel   Shower Transfers  Shower - Transfer From: Wheelchair  Shower - Transfer Type: To and From  Shower - Transfer To: Transfer tub bench  Shower - Technique: Stand pivot  Shower Transfers: Minimal assistance  Shower Transfers Comments: Pt able to complete stand pivot transfer from wheelchair to tub transfer bench with verbal cues for hand/foot placement to increase safety and min A provided for stability. Fine Motor: OT facilitated pts engagement in LUE [address] task this date. Pt able to participate in [address] task of placing golf tees into holes on rotating wheel utilizing LUE. Pt demonstrated inconsistent grasps when retrieving tees from container and placing them into the holes. Pt then participated in [address] task of retrieving coins from table top and placing them into a piggy bank. Pt able to retrieve coins from table top and practice in-hand manipulation technique utilizing LUE. Pt able to complete this task with some droppage of coins when attempting to complete in-hand manipulation and increased time required. ADL  Equipment Provided: Sock aid;Long-handled sponge;Reacher  Grooming: Minimal assistance  UE Bathing: Stand by assistance  LE Bathing: Minimal assistance (Min A for stability in stance during juanita care)  UE Dressing: Verbal cueing;Minimal assistance (A with c-collar and pulling shirt on; vc for precautions)  LE Dressing: Minimal assistance (A with pulling pants/brief up; sock aid and reacher used)  Toileting: Minimal assistance (A provided with toileting hygiene/clothing management)  Additional Comments: AM: Pt completed stand pivot transfer from wheelchair to toilet with Min A for safety and stability. Pt completed toileting with stand by assistance. Min A required for toileting hygiene/clothing management with verbal cues provided for UE support on grab bar for added stability.  Pt able to transfer back to wheelchair with Min A. Pt transferred onto tub bench with Min A. Pt required to A to doff c-collar and pull shirt off; c-collar placed back on pt. With Min A and LUE support, pt able to stand to doff pants. Pt returned to sitting on tub bench, able to utilize reacher to remove pants/brief from legs. While seated on tub bench, pt able to complete UB bathing with stand by assistance. Pt able to cleanse legs/feet utilizing long handled sponge, min A provided to cleanse juanita/buttocks area while in stance with LUE support on grab bar. Pt able to dry off, completed stand pivot transfer back to wheelchair. Min A required to doff c-collar, pull on shirt, and replace c-collar. Pt utilized reacher to don brief and pants over BLE. Pt required Min A to manage pants/brief up while in stance. Pt returned to sitting in wheelchair, able to utilize sock aid to don socks. Pt completed grooming while seated in wheelchair at sink, min A given when brushing hair to maintain precautions. Assessment  Performance deficits / Impairments: Decreased ADL status; Decreased functional mobility ; Decreased ROM; Decreased strength;Decreased safe awareness;Decreased endurance;Decreased balance;Decreased high-level IADLs;Decreased fine motor control;Decreased coordination;Decreased posture  Assessment: Pt would benefit from further skilled OT to address deficits and increase safety and independence with daily activities. Prognosis: Good  Discharge Recommendations: Patient would benefit from continued therapy after discharge;Home with assist PRN  Activity Tolerance: Patient Tolerated treatment well  Safety Devices in place: Yes  Type of devices: All fall risk precautions in place;Call light within reach;Gait belt;Patient at risk for falls; Left in bed;Nurse notified          Patient Education: functional transfers, ADL retraining, cervical precautions, importance of incorporating use of LUE for [address]  Patient Goals   Patient goals : \"To get my body that way so that I can take care of things on my own. \"  Learner:patient  Method: explanation       Outcome: demonstrated understanding        Plan  Plan  Times per week: 5-7  Times per day: Twice a day  Current Treatment Recommendations: Self-Care / ADL,Strengthening,Balance Training,Functional Mobility Training,Endurance [employer] Management,Safety Education & Training,Patient/Caregiver Education & Training,Equipment Evaluation, Education, & procurement,Home Management Training  Patient Goals   Patient goals : \"To get my body that way so that I can take care of things on my own. \"  Short term goals  Time Frame for Short term goals: By 1 week  Short term goal 1: Pt will complete lower body dressing/bathing with min A and Good safety with use of AE as needed  Short term goal 2: Pt will complete upper body dressing with CGA  and Good safety while maintaining cervical precautions  Short term goal 3: Pt will complete functional transfers during self care tasks with min A and Good safety  Short term goal 4: Pt will tolerate standing 4+ minutes during functional activity of choice with min A and Good safety  Short term goal 5: Pt will verbalize/demonstrate Good understanding of cervical precautions during self care tasks with increase safety and independence with daily activities  Short term goal 6: Pt will participate in 30+ mintues of therapeutic exercises/functional activities to increase safety and independence with self care and mobility  Long term goals  Time Frame for Long term goals : By discharge  Long term goal 1: Pt will complete BADLs with modified independence and Good safety while maintaining cervical precautions  Long term goal 2: Pt will complete functional transfers during self care tasks with modified independence with Good safety   Long term goal 3: Pt will tolerate standing 8+ mintues during functional activity of choice with Good safety  Long term goal 4: Pt will verbalize/demonstrate Good understanding of home safety/fall prevention strategies to increase safety and independence with self care and mobility  Long term goal 5: Pt will verbalize/demonstrate Good understanding of adaptive strategies/AE/DME to assisit with maintaining cervical precautions and increase safety and independence with self care and mobility  Long term goals 6: Pt will complete simple meal prep/light house keeping tasks with CGA and Good safety  Long term goal 7: OT to assess FM and  strength       04/18/22 1107 04/18/22 1402   OT Individual Minutes   Time In 1107 1402   Time Out 1206 1432   Minutes 59 30   Time Code Minutes    Timed Code Treatment Minutes 59 Minutes 32 Minutes     Electronically signed by Zeeshan Chapman OT on 4/18/22 at 4:06 PM EDT

## 2022-04-18 NOTE — PROGRESS NOTES
[address] Internal Medicine  Nehemias Hummel MD; John Kim MD; Steven Martino MD; MD Leandro Vargas MD; Lasha Hazel MD    [facility]   [address] / HISTORY AND PHYSICAL EXAMINATION            Date:   4/18/2022  Patient name:  Malik Martin  Date of admission:  4/15/2022  5:32 PM  MRN:   [id number]  Account:  [de-identified]  YOB: 1960  PCP:    Teresa Mcdaniel MD  Room:   [facility]6317-49  Code Status:    Full Code    Physician Requesting Consult: Sari Benavidez MD    Reason for Consult: Medical management    Chief Complaint:     No chief complaint on file. Underwent anterior C5 corpectomy, posterior fixation from C2-T1 with arthrodesis, osteotomy, correction of deformity on April 12    History Obtained From:     patient    History of Present Illness: The patient is a 61 y.o. female presented for elective surgery. Underwent anterior C5 corpectomy, posterior fixation from C2-T1 with arthrodesis, osteotomy, correction of deformity on 4/12 with Dr. Sims Number 4/12. Symptoms include significant left-sided paresis, severe lower extremity weakness (wheelchair bound, now unable to stand or ambulate [id number]), progressively worsening paresthesias and dexterity issues with left upper extremity and left lower extremity.  Additionally complains of some moderate saddle anesthesia, urge incontinence but no bowel dysfunction.   Admitted to acute rehab for further management for deconditioning,  4/18   No new complaints  Pain is controlled on methadone  Working with therapy      Past Medical History:     Past Medical History:   Diagnosis Date    Anxiety     Arthritis     At maximum risk for fall 12/29/2021    caudal equina syndrome and cervical stenosis    Cancer ([address]     right breast    Cauda equina syndrome ([address] 12/29/2021    neuropathy, mobility difficulty, incontinance    Cervical stenosis of spine 12/29/2021    GERD (gastroesophageal reflux disease)     History of stomach ulcers     Hypertension     Dr. Jennifer Mcneill    Hypothyroidism     Lumbar neuritis     Post laminectomy syndrome     Spinal deformity 11/2021    cervical and lumbar    Thyroid disease     Uses wheelchair     Wears dentures     Wellness examination     Dr. Jennifer Mcneill        Past Surgical History:     Past Surgical History:   Procedure Laterality Date    BACK SURGERY      lower back x 3, cervical- x 1: C4- C6, 11/4/2014     BREAST SURGERY Right     mastectomy with reconstruction    CERVICAL FUSION N/A 4/12/2022    C5 CORPECTOMY, USE OF INTRAOPERATIVE CERVICAL TRACTION performed by Luis Ennis DO at [address] [place] [address] N/A 4/12/2022    POSTERIOR FIXATION C2-C7 performed by Luis nEnis DO at [facility]  about 2018    HERNIA REPAIR Bilateral     inguinal    HYSTERECTOMY, TOTAL ABDOMINAL      LUMBAR SPINE SURGERY N/A 12/30/2021    LUMBAR LAMINECTOMY L2-S1 performed by Luis Ennis DO at [address] [facility]  04/12/2022    C5 CORPECTOMY, USE OF INTRAOPERATIVE CERVICAL TRACTION (N/A Spine Cervical)         Medications Prior to Admission:     Prior to Admission medications    Medication Sig Start Date End Date Taking?  Authorizing Provider   dilTIAZem (DILACOR XR) 180 MG extended release capsule TAKE ONE CAPSULE BY MOUTH DAILY 4/1/22   Leighton Alejandre MD   ferrous sulfate (IRON 325) 325 (65 Fe) MG tablet Take 1 tablet by mouth 2 times daily (with meals) 1/13/22   Raquel Harris MD   docusate sodium (COLACE) 100 MG capsule Take 2 capsules by mouth 2 times daily 1/13/22   Raquel Harris MD   pantoprazole (PROTONIX) 40 MG tablet TAKE ONE TABLET BY MOUTH DAILY 12/20/21   Jorge Castañeda MD   busPIRone (BUSPAR) 30 MG tablet Take 30 mg by mouth 2 times daily 12/17/21   Jorge Castañeda MD   levothyroxine (SYNTHROID) 88 MCG tablet Take 1 tablet by mouth Daily 12/17/21 3/18/22  Farrukh Giron MD   tiZANidine (ZANAFLEX) 4 MG tablet Take 4 mg by mouth every 12 hours    Historical Provider, MD   Vitamin D, Ergocalciferol, 50 MCG (2000 UT) CAPS TAKE ONE CAPSULE BY MOUTH DAILY 3/29/21   Gay Yoder PA-C   HYDROcodone-acetaminophen [facility]) 7.5-325 MG per tablet Up to three tablets a day. 10/21/15   Historical Provider, MD   methadone (DOLOPHINE) 10 MG tablet Take 10 mg by mouth 2 times daily. Historical Provider, MD        Allergies:     Latex and Kiwi extract    Social History:     Tobacco:    reports that she quit smoking about 8 years ago. She has a 15.00 pack-year smoking history. She has never used smokeless tobacco.  Alcohol:      reports current alcohol use. Drug Use:  reports no history of drug use. Family History:     Family History   Problem Relation Age of Onset    Hypertension Mother     Heart Disease Mother     Cancer Mother        Review of Systems:     Positive and Negative as described in HPI. CONSTITUTIONAL:  negative for fevers, chills, sweats, fatigue, weight loss  HEENT:  negative for vision, hearing changes, runny nose, throat pain  RESPIRATORY:  negative for shortness of breath, cough, congestion, wheezing. CARDIOVASCULAR:  negative for chest pain, palpitations.   GASTROINTESTINAL:  negative for nausea, vomiting, diarrhea, constipation, change in bowel habits, abdominal pain   GENITOURINARY:  negative for difficulty of urination, burning with urination, frequency   INTEGUMENT:  negative for rash, skin lesions, easy bruising   HEMATOLOGIC/LYMPHATIC:  negative for swelling/edema   ALLERGIC/IMMUNOLOGIC:  negative for urticaria , itching  ENDOCRINE:  negative increase in drinking, increase in urination, hot or cold intolerance  MUSCULOSKELETAL:  negative joint pains, muscle aches, swelling of joints  NEUROLOGICAL:  negative for headaches, dizziness, lightheadedness, numbness, pain, tingling extremities  BEHAVIOR/PSYCH: negative for depression, anxiety    Physical Exam:     /66   Pulse 100   Temp 98.2 °F (36.8 °C) (Oral)   Resp 18   Ht 5' 1\" (1.549 m)   SpO2 95%   BMI 26.83 kg/m²   Temp (24hrs), Av.6 °F (37 °C), Min:98.2 °F (36.8 °C), Max:99 °F (37.2 °C)    No results for input(s): POCGLU in the last 72 hours. No intake or output data in the 24 hours ending 22 1711    General Appearance:  alert, well appearing, and in no acute distress  Mental status: oriented to person, place, and time with normal affect  Head:  normocephalic, atraumatic. Eye: no icterus, redness, pupils equal and reactive, extraocular eye movements intact, conjunctiva clear  Ear: normal external ear, no discharge, hearing intact  Nose:  no drainage noted  Mouth: mucous membranes moist  Neck: supple, no carotid bruits, thyroid not palpable  Lungs: Bilateral equal air entry, clear to ausculation, no wheezing, rales or rhonchi, normal effort  Cardiovascular: normal rate, regular rhythm, no murmur, gallop, rub. Abdomen: Soft, nontender, nondistended, normal bowel sounds, no hepatomegaly or splenomegaly  Neurologic: There are no new focal motor or sensory deficits, normal muscle tone and bulk, no abnormal sensation, normal speech, cranial nerves II through XII grossly intact  Skin: No gross lesions, rashes, bruising or bleeding on exposed skin area  Extremities:  peripheral pulses palpable, no pedal edema or calf pain with palpation  Psych: normal affect    Investigations:      Laboratory Testing:  No results found for this or any previous visit (from the past 24 hour(s)). Imaging/[facility]agonstics:  XR CERVICAL SPINE (2-3 VIEWS)    Result Date: 2022  Expected postoperative findings status post anterior and posterior cervical fusion. XR CHEST PORTABLE    Result Date: 2022  No acute cardiopulmonary disease. XR CERVICAL SPINE FLEXION AND EXTENSION    Result Date: 2022  1.  Retrolisthesis of C2 on C3 measures 2 mm on extension view and reduces on flexion view. 2. No evidence of instability of anterolisthesis of C3 on C4 measuring 1.5 mm. 3. No acute fracture on limited views. Of note, the C6-C7 and C7-T1 levels are not well seen due to overlying soft tissues. 4. Severe multilevel degenerative changes. CTA NECK W CONTRAST    Result Date: 4/12/2022  Unremarkable CTA of the neck. Minor hazy pleural thickening and chronic-appearing medial right upper lobe atelectasis/scarring. If there is any clinical concern, follow-up noncontrast chest CT may be useful. RECOMMENDATIONS: Unavailable       Assessment :      Hospital Problems           Last Modified POA    * (Principal) Spinal cord injury, cervical region, sequela ([facility] 4/15/2022 Yes    Essential hypertension 4/16/2022 Yes    Hypothyroidism 4/16/2022 Yes    Post-menopausal osteoporosis 4/16/2022 Yes    Cervical myelopathy ([facility] 4/16/2022 Yes    Anemia, normocytic normochromic 4/16/2022 Yes          Plan:     1. Spinal cord injury status post anterior C5 corpectomy, posterior fixation from C2-T1 with arthrodesis, osteotomy,  2. Essential hypertension, continue monitor blood pressure, continue home medications, added lisinopril, diltiazem,  3. Hyponatremia, better now ,  4. Constipation , prn meds   5. Hypothyroidism, continue levothyroxine,  6. Continue physical therapy,  7. DVT prophylaxis    Consultations:   IP CONSULT TO DIETITIAN  IP CONSULT TO SOCIAL WORK  IP CONSULT TO INTERNAL MEDICINE      Ana Juarez MD  4/18/2022  5:11 PM    Copy sent to Dr. Ian Bailey MD    Please note that this chart was generated using voice recognition Dragon dictation software. Although every effort was made to ensure the accuracy of this automated transcription, some errors in transcription may have occurred.

## 2022-04-18 NOTE — PROGRESS NOTES
Physical Medicine & Rehabilitation  Progress Note      Subjective:      62year-old female with nontraumatic cervical spinal cord injury secondary to servical stenosis. Patient is c/o muscle spasms BLEs today which has been present previously but was worse this morning. She also notes some small bowel movements but still some mild distention. ROS:  Denies fevers, chills, sweats. No chest pain, palpitations, lightheadedness. Denies coughing, wheezing or shortness of breath. Denies abdominal pain, nausea, diarrhea or constipation. No new areas of joint pain. Denies new areas of numbness or weakness. Denies new anxiety or depression issues. No new skin problems. Rehabilitation:   Progressing in therapies. PT:  Restrictions/Precautions: Fall Risk,General Precautions  Implants present? :  (Lumbar/Neck surgery)  Other position/activity restrictions: ambulate pt; s/p C2-C7 fixation  Required Braces or Orthoses  Cervical: c-collar (aspen collar on while out of bed ok to remove while resting in bed and eating)   Transfers  Sit to Stand: Moderate Assistance  Stand to sit: Moderate Assistance  Bed to Chair: Maximum assistance  Comment: Cues for errect posture, decreased coordination noted B LE L LE> R LE. Ambulation 1  Surface: level tile  Device: Parallel Bars  Assistance: Maximum assistance  Quality of Gait: Decreased coordiantion noted at LE as well as trunk. Unsteady steps. Pt fearfull of steps  Gait Deviations: Slow Sharon,Decreased step length,Decreased arm swing  Distance: FWD and BWD amb. in // bars 8 ft x 3 reps (Breaks given PRN.)    Transfers  Sit to Stand: Moderate Assistance  Stand to sit: Moderate Assistance  Bed to Chair: Maximum assistance  Comment: Cues for errect posture, decreased coordination noted B LE L LE> R LE.   Ambulation  Ambulation?: Yes  More Ambulation?: No  Ambulation 1  Surface: level tile  Device: Parallel Bars  Assistance: Maximum assistance  Quality of Gait: Decreased coordiantion noted at LE as well as trunk. Unsteady steps. Pt fearfull of steps  Gait Deviations: Slow Sharon,Decreased step length,Decreased arm swing  Distance: FWD and BWD amb. in // bars 8 ft x 3 reps (Breaks given PRN.)    Surface: level tile  Ambulation 1  Surface: level tile  Device: Parallel Bars  Assistance: Maximum assistance  Quality of Gait: Decreased coordiantion noted at LE as well as trunk. Unsteady steps. Pt fearfull of steps  Gait Deviations: Slow Sharon,Decreased step length,Decreased arm swing  Distance: FWD and BWD amb. in // bars 8 ft x 3 reps (Breaks given PRN.)    OT:  ADL  Equipment Provided: Sock aid,Reacher  Feeding: Setup  Grooming: Minimal assistance (seated in w/c sinklevel; A brushing hair to adhere percuatio)  UE Bathing: Stand by assistance (Seated sinklevel )  LE Bathing: Moderate assistance (MOD A standing, A post juanita care, figure 4 for BLEs/feet)  UE Dressing: Moderate assistance,Setup,Verbal cueing (A for cervical collar and over head shirt )  LE Dressing: Moderate assistance,Setup,Verbal cueing (A doffing/donning pants/brief over hips; MOD A standing )  Toileting: Moderate assistance,Increased time to complete (A brief/pants over hips and juanita care post BM )  Additional Comments: LE Dressing: PM Pt seated EOB, A for TEDs and pt able to don shoes with ties, with vc for figure 4 tech to increase ease and independence for dressing task         Balance  Sitting Balance: Contact guard assistance (EOB for donning shoes )  Standing Balance: Moderate assistance (MIN/MOD A )   Standing Balance  Time: AM: <1 min x5  Activity: functional transfers, lower body dressing/bathing, toileting  Comment: with 1-2 UE support. LLE buckling, req vc to lock L knee for increased safety.  1 LOB req MOD A for recovery   Functional Mobility  Functional - Mobility Device: Wheelchair  Activity: To/from bathroom  Assist Level: Dependent/Total  Functional Mobility Comments: did not self propel      Bed mobility  Bridging:  (not indicated at this time)  Sit to Supine: Moderate assistance (A with BLEs)  Scooting: Minimal assistance  Comment: Pt returned to supine position at end of session. Pt sat EOB with slight LOB requriing min A to correct. A with bringing BLE into bed. Transfers  Stand Step Transfers: Minimal assistance  Sit to stand: Moderate assistance  Stand to sit: Moderate assistance  Transfer Comments: Pt fluctuates with assistance between MIN-MOD A, vc for hand/foot placement pre/post transfers for increased pt safety, vc for seqeuncing of transfers   Toilet Transfers  Toilet - Technique: Stand pivot,To left,To right  Equipment Used: Grab bars  Toilet Transfer: Moderate assistance  Toilet Transfers Comments: Verbal cues for hand placement and safety. L side weakness with LLE buckling. Wheelchair Bed Transfers  Wheelchair/Bed - Technique: Stand pivot  Equipment Used: Bed,Wheelchair  Level of Asssistance: Moderate assistance  Wheelchair Transfers Comments: Verbal cues for hand placement and safety. L side weakness with LLE buckling. SPEECH:      Objective:  /66   Pulse 100   Temp 98.2 °F (36.8 °C) (Oral)   Resp 18   Ht 5' 1\" (1.549 m)   SpO2 95%   BMI 26.83 kg/m²       GEN: Well developed, well nourished, in NAD  HEENT:  NCAT. PERRL. EOMI. Mucous membranes pink and moist. Hard cervical collar in place  PULM:  Clear to ausculation. No rales or rhonchi. Respirations WNL and unlabored. CV:  Regular rate rhythm. No murmurs or gallops. GI:  Abdomen soft. Tender, distended. BS + and hypoactive. NEUROLOGICAL: A&O x3. Sensation intact to light touch. .   MSK:  Functional ROM BUE and BLEs. Motor testing 4/5 key muscles BUEs, 3/5 B hip flexion, 3/5 R knee extension, 3-/5 L knee extension, 3-/5 B dorsiflexion . SKIN: Warm dry and intact. Good turgor. EXTREMITIES:  No calf tenderness to palpation. No edema BLEs. PSYCH: Mood WNL. Appropriately interactive. Affect WNL. Diagnostics:     CBC:   Recent Labs     04/16/22  0730   WBC 8.6   RBC 3.34*   HGB 9.4*   HCT 28.9*   MCV 86.6   RDW 18.4*        BMP:   Recent Labs     04/16/22  0730   *   K 3.8   CL 97*   CO2 24   BUN 8   CREATININE 0.54   GLUCOSE 95     BNP: No results for input(s): BNP in the last 72 hours. PT/INR: No results for input(s): PROTIME, INR in the last 72 hours. APTT: No results for input(s): APTT in the last 72 hours. CARDIAC ENZYMES: No results for input(s): CKMB, CKMBINDEX, TROPONINT in the last 72 hours. Invalid input(s): CKTOTAL;3 troponins   FASTING LIPID PANEL:  Lab Results   Component Value Date    CHOL 198 07/23/2020     (A) 07/23/2020    TRIG 96 07/23/2020     LIVER PROFILE: No results for input(s): AST, ALT, ALB, BILIDIR, BILITOT, ALKPHOS in the last 72 hours.      Current Medications:   Current Facility-Administered Medications: [START ON 4/20/2022] vitamin D3 (CHOLECALCIFEROL) tablet 5,000 Units, 5,000 Units, Oral, Weekly  baclofen (LIORESAL) tablet 10 mg, 10 mg, Oral, TID  lisinopril (PRINIVIL;ZESTRIL) tablet 5 mg, 5 mg, Oral, Daily  bisacodyl (DULCOLAX) suppository 10 mg, 10 mg, Rectal, Daily PRN  polyethylene glycol (GLYCOLAX) packet 17 g, 17 g, Oral, Daily  senna (SENOKOT) tablet 17.2 mg, 2 tablet, Oral, Daily PRN  acetaminophen (TYLENOL) tablet 650 mg, 650 mg, Oral, Q6H  magnesium hydroxide (MILK OF MAGNESIA) 400 MG/5ML suspension 30 mL, 30 mL, Oral, Daily PRN  ondansetron (ZOFRAN-ODT) disintegrating tablet 4 mg, 4 mg, Oral, Q8H PRN **OR** [DISCONTINUED] ondansetron (ZOFRAN) injection 4 mg, 4 mg, IntraVENous, Q6H PRN  oxyCODONE (ROXICODONE) immediate release tablet 5 mg, 5 mg, Oral, Q4H PRN **OR** oxyCODONE HCl (OXY-IR) immediate release tablet 10 mg, 10 mg, Oral, Q4H PRN  busPIRone (BUSPAR) tablet 30 mg, 30 mg, Oral, BID  dilTIAZem (CARDIZEM CD) extended release capsule 180 mg, 180 mg, Oral, Daily  docusate sodium (COLACE) capsule 200 mg, 200 mg, Oral, BID  enoxaparin (LOVENOX) injection 40 mg, 40 mg, SubCUTAneous, Daily  ferrous sulfate (IRON 325) tablet 325 mg, 325 mg, Oral, BID WC  levothyroxine (SYNTHROID) tablet 88 mcg, 88 mcg, Oral, Daily  methadone (DOLOPHINE) tablet 10 mg, 10 mg, Oral, BID  neomycin-bacitracin-polymyxin (NEOSPORIN) ointment, , Topical, BID  pantoprazole (PROTONIX) tablet 40 mg, 40 mg, Oral, Daily      Impression/Plan:   Impaired ADLs, gait, and mobility due to:      1. Nontraumatic cervical spinal cord injury secondary to cervical stenosis: s/p C5 corpectomy, C4-6 arthrodesis, open reduction cervical kyphosis, C2-7 posterior fusion performed by Dr. Cecille Toure on 4/12/2022. PT/OT for gait, mobility, strengthening, endurance, ADLs, and self care. Has Tylenol prn, methadone BID, robaxin prn, oxycodone prn for pain. Discontinued Robaxin and change to routine baclofen for muscle spasms/pain on 4/18. Increased dose to 4 times daily. 2. Hx cauda equina syndrome: s/p L2-S1 laminectomy (December 2021)  3. HTN: on Diltiazem. 4. Anemia: Hb low but improving. On ferrous sulfate. Monitoring. 5. Hypothyroidism: on levothyroxine. 6. Hyponatremia: stable. Monitoring. Medical management by IM  7. GERD: on pantoprazole. 8. Anxiety:on buspar. 9. Vitamin D deficiency: on repletion weekly  10. Hx breast cancer  11. Bowel Management: Miralax daily, senokot prn, dulcolax prn. Has milk of magnesia prn  12. DVT Prophylaxis:  low molecular weight heparin, SCD's while in bed and HERMINIO's   13. Internal medicine for medical management      Electronically signed by Aleksey Sampson MD on 4/18/2022 at 11:01 AM      This note is created with the assistance of a speech recognition program.  While intending to generate a document that actually reflects the content of the visit, the document can still have some errors including those of syntax and sound a like substitutions which may escape proof reading.   In such instances, actual meaning can be extrapolated by contextual diversion.

## 2022-04-18 NOTE — PATIENT CARE CONFERENCE
[facility] Acute Inpatient Rehabilitation  TEAM CONFERENCE NOTE  Date: 22  Patient Name: Marta Galarza       Room: 4216/5861-47  MRN: [id number]       : 1960  (62 y.o.)     Gender: female       Spinal cord injury, cervical region, sequela ([address] [S80.082Z]  Diagnosis: Nontraumatic cervical spinal cord injury     NURSING  Bladder  Stress Incontinence Only  Bowel   Always Continent  Date of Last BM: 22  Intervention    Both Bowel & Bladder Program     Wounds/Incisions/Ulcers: Surgical incision to posterior neck with sutures, and R neck  With surgical glue. Both incisions well approximated without redness or drainage. Medication Education Program: Patient able to manage medications and being educated by nursing  Pain: Patient's pain is currently controlled with -  tylenol 650 mg q 6 hours, oxycodone 10 mg q 4 hours prn    Fall Risk:  Falling star program initiated    PHYSICAL THERAPY  Bed mobility  Bridging:  (not indicated at this time)  Supine to Sit: Minimal assistance  Sit to Supine: Minimal assistance  Scooting: Minimal assistance  Comment: Cervical Collar donned. Transfers:  Sit to Stand: Moderate Assistance  Stand to sit: Moderate Assistance  Bed to Chair: Maximum assistance  Comment: Breaks given PRN    Ambulation 1  Surface: level tile  Device: Rolling Walker  Other Apparatus: Wheelchair follow  Assistance: Maximum assistance  Quality of Gait: Decreased coordination noted at LE as well as trunk. Unsteady steps. Pt fearfull of steps  Gait Deviations: Slow Sharon;Decreased step length;Decreased arm swing  Distance: 30 ft  twice; seated break betweeen (Breaks given PRN.)     Propulsion 1  Propulsion: Manual  Level: Level Tile  Method: RUE;LUE  Level of Assistance: Minimal assistance; Moderate assistance  Description/ Details: min A for straight path. mod A for wide turns.   Distance: 60 ft, with 2 to 3 rest breaks         Goals  Time Frame for Short term goals: 10 days  Short term goal 1: Pt will perform bed mobility/rolling with SBA  Short term goal 2: Pt will perform transfers with Miryam  Short term goal 3: Pt will ambulate 50 to 100 ft, with rolling walker, Miryam. Short term goal 4: Pt will perform wheelchair mobility for 150 ft, SB stright path, min A for corners. Short term goal 5: Pt able to perform 3 to 5 steps with 2 rails, min/mod A      OCCUPATIONAL THERAPY  SELF CARE   Equipment Provided: Sock aid,Long-handled sponge,Reacher  Eating            Setup   Oral Hygiene            Minimal assistance   Shower/Bathe Self             UE Bathing: Stand by assistance  LE Bathing: Minimal assistance (Min A for stability in stance during juanita care)   Upper Body Dressing            Minimal assistance   Lower Body Dressing            Putting On/Taking Off Footwear             Minimal assistance   Toilet Transfer               Moderate assist  Toileting Hygiene            Minimal assistance (A provided with toileting hygiene/clothing management)    Bed mobility  Supine to Sit: Contact guard assistance  Comment: HOB slightly elevated      Shower Transfers: Contact Guard  Balance  Sitting Balance: Stand by assistance  Standing Balance: Minimal assistance  Standing Balance  Time: AM: 1 min x3; PM: pt able to stand for 1:32 while completing [address] activity of placing golf tees into holes in wheel. Pt with slight posterior LOB, requiring Min A to recover. Pt required verbal cues to lock L knee. Activity: functional transfers, lower body dressing/bathing, toileting; PM: fine motor coordination/standing tolerance activity  Comment: 1-2 UE support    Equipment Recommendations  Equipment Needed:  (TBD)  Assessment: Pt would benefit from further skilled OT to address deficits and increase safety and independence with daily activities.     Short term goals  Time Frame for Short term goals: By 1 week  Short term goal 1: Pt will complete lower body dressing/bathing with min A and Good safety with use of AE as needed  Short term goal 2: Pt will complete upper body dressing with CGA  and Good safety while maintaining cervical precautions  Short term goal 3: Pt will complete functional transfers during self care tasks with min A and Good safety  Short term goal 4: Pt will tolerate standing 4+ minutes during functional activity of choice with min A and Good safety  Short term goal 5: Pt will verbalize/demonstrate Good understanding of cervical precautions during self care tasks with increase safety and independence with daily activities  Short term goal 6: Pt will participate in 30+ mintues of therapeutic exercises/functional activities to increase safety and independence with self care and mobility      SPEECH THERAPY    Orientation Log (O-Log) Score:   Cognitive Log (Cog-Log) Score:   Short Term Goal:     NUTRITION    / Body mass index is 26.83 kg/m². Diet: Reguar  Pt with large variations in po intake %. Will try added Magic Cup to trays twice daily. Please see nutrition note for details. SOCIAL WORK ASSESSMENT  Assessment: home with family/ VNS undecided  Pre-Admission Status:  Lives With: Significant other,Family (Partner (Emmett) and 15 yo grandson Julienne Osorio))  Type of Home: House  Home Layout: Two level,Performs ADL's on one level,Able to Live on Main level with bedroom/bathroom (\"we don't go upstairs\")  Home Access: Stairs to enter without rails  Entrance Stairs - Number of Steps: 1 curb step (pt receives help getting up steps)  Entrance Stairs - Rails: None  Bathroom Shower/Tub: Tub/Shower unit,Shower chair with back,Doors  Bathroom Toilet: Handicap height  Bathroom Equipment: Grab bars in shower,Tub transfer bench (Sink counter on the side of the toilet)  Bathroom Accessibility: Accessible  Home Equipment: [address] wheeled walker,Cane,Reacher,Lift chair (began using w/c last 2 weeks)  ADL Assistance: Independent (A with getting into shower;  Increased A 2 weeks prior to sx)  Homemaking Assistance: Needs assistance (Share responsibilities prior; Increased A 2 wks prior to sx)  Homemaking Responsibilities: Yes (Dannie Daniels prior to decline; increased A prior to sx)  Meal Prep Responsibility: Secondary  Laundry Responsibility: Secondary  Cleaning Responsibility: Secondary  Shopping Responsibility: Secondary  Ambulation Assistance: Independent (IND prior to decline; Sitting on 4WW with SO pushing prior to sx)  Transfer Assistance: Independent (IND prior to decline; A from family over the last month)  Active : No  Patient's  Info: pt partner and grandson drive  Mode of Transportation: Majulio Adrian (Pt reports partner and grandson assist with vehicle transfers from w/c)  Occupation: On disability  Leisure & Hobbies: gardening, watching tv,\"did whatever made me happy. \"  IADL Comments: pt sleeps in recliner/lift chair at baseline  Additional Comments: Pt reports that she was independent after discharge from [facility]U up until about 1 month prior to sx with progressive decline requiring increased A from family. Pt reports needing max assist for all bathing, dressing, toileting, and self-care ADLs for approximately 2-4 weeks prior to sx; pt needed some assistance with ADLs ~2-4 weeks ago. Pt reports sponge-bathing in bathroom due to decline. Pt was using 4WW to sit and have partner push around priorto getting w/c and used w/c prior to sx for about 2 weeks around the house and in the community, receiving assist from partner and grandson for mobility and transfers; pt reports using RW >2-4 weeks prior to sx for func mob. Pt partner works full time days and grandson is currently in online school and is able to assist as needed.       Family Education: Need to make contact with family to initiate education    Percentage Risk for Readmission: Low 0 - 18%   Readmission Risk              Risk of Unplanned Readmission:  16       %    Critical Items: None       Problem / Barrier Intervention / Plan Results   Impaired function related to B LE weakness, L LE> RLE Strengthening, balance ex's, functional mobility training with assistive device. Altered ability to care for self Training and remediation in modified care strategies                               Total Self Care Score    Total Mobility Score  Admission Score:  18      Admission Score:  26  Goal:  42/42         Goal:  66/90   `  Discharge Plan   Estimated Discharge Date: 5/2/2022  Home evaluation needed? Home Evaluation Indication (NO, Requires ReEval, YES/Date): No home evaluation need indicated for patient at this time  Overnight or Day Pass: No  Factors facilitating achievement of predicted outcomes: Family support, Motivated, Cooperative, Pleasant, Good insight into deficits, Knowledge about rehab and Has homemaker services  Barriers to the achievement of predicted outcomes: Pain, Decreased endurance, Upper extremity weakness, Lower extremity weakness, Medical complications and Medication managment    Functional Goals at discharge:  Predicted Outcome: Home with familyPATIENT'S LEVEL OF ASSISTANCE: Stand By Assistance   Discharge therapy goals:  PT: Long term goals  Time Frame for Long term goals : By DC  Long term goal 1: Pt able to perform sit<>stand transfers, mod-I  Long term goal 2: Pt able to perform pivot transfers at SBA/supervsion. Long term goal 3: Pt able to ambulate with rolling walker 100 to 150 ft , level surfaces, with rolling walker, CGA. Long term goal 4: Pt able to perform curb step with UE support at [address] term goal 5: Pt able to perform 5 to 12 steps with 2 rails at min A to improve LE strength/coordination  Long term goal 6: Pt able to propel w/c  distance of 150 ft SBA level surfaces  Long term goal 7: Pt able to ambulate on outside terraine/incline surface at min A with rolling walker  Long term goal 8: Pt able to ambulate distance of 60 to 70 ft for 2MWT to improve fucntion and gait speed.   Long term goal 5: Family training for safe functional mobility for DC home  OT:Long term goals  Time Frame for Long term goals : By discharge  Long term goal 1: Pt will complete BADLs with modified independence and Good safety while maintaining cervical precautions  Long term goal 2: Pt will complete functional transfers during self care tasks with modified independence with Good safety   Long term goal 3: Pt will tolerate standing 8+ mintues during functional activity of choice with Good safety  Long term goal 4: Pt will verbalize/demonstrate Good understanding of home safety/fall prevention strategies to increase safety and independence with self care and mobility  Long term goal 5: Pt will verbalize/demonstrate Good understanding of adaptive strategies/AE/DME to assisit with maintaining cervical precautions and increase safety and independence with self care and mobility  Long term goals 6: Pt will complete simple meal prep/light house keeping tasks with CGA and Good safety  Long term goal 7: OT to assess FM and  strength  ST:     Participating Team Members:  /:  Ellis Del Cid RN, [facility]  Occupational Therapist: Monica Philippe OT    Physical Therapist: Arnulfo Crowe PT  Speech Therapist:  N/A  Nurse: Vijaya Cabrera RN     Dietary/Nutrition: Braydon Menard RD, LD  Pastoral Care: Prasanna Uribe  CMG: Olga Zurita, DENA    I approve the established interdisciplinary plan of care as documented within the medical record of Mary Meng.     Tres Morrow MD

## 2022-04-18 NOTE — PROGRESS NOTES
Physical Therapy  Facility/Department: [facility] ACUTE REHAB  Daily Treatment Note  NAME: Darvin An  : 1960  MRN: [id number]    Date of Service: 2022    Discharge Recommendations:  Home with assist PRN,Patient would benefit from continued therapy after discharge   PT Equipment Recommendations  Other: Pt has rolling walker, rollator, manual wheelchair and lift chair at home    Assessment   Body structures, Functions, Activity limitations: Decreased functional mobility ; Decreased ADL status; Decreased strength;Decreased endurance;Decreased posture;Decreased balance;Decreased coordination; Increased pain  Assessment: Pt require mod A for bed mobility, max A for sit <> stand and stand weight shifts, Pt able to take 12-13 step RW, mod A +CGA of 2nd person. VC for foot placement and weight shifting, pt will continue to benefit from PT to progress activity and promote safety with gait and transfers  Treatment Diagnosis: B LE/B UE weakness, difficulty walking  Prognosis: Good  Decision Making: High Complexity  Barriers to Learning: none  REQUIRES PT FOLLOW UP: Yes  Activity Tolerance  Activity Tolerance: Patient limited by pain; Patient limited by fatigue;Patient limited by endurance     Patient Diagnosis(es): There were no encounter diagnoses. has a past medical history of Anxiety, Arthritis, At maximum risk for fall, Cancer ([facility] [address] Cauda equina syndrome (HCC), Cervical stenosis of spine, GERD (gastroesophageal reflux disease), History of stomach ulcers, Hypertension, Hypothyroidism, Lumbar neuritis, Post laminectomy syndrome, Spinal deformity, Thyroid disease, Uses wheelchair, Wears dentures, and Wellness examination. has a past surgical history that includes hernia repair (Bilateral); Breast surgery (Right); Mastectomy, radical; Hysterectomy, total abdominal; Lumbar spine surgery (N/A, 2021); back surgery;  Colonoscopy (about ); other surgical history (2022); cervical fusion (N/A, 2022); and cervical fusion (N/A, 4/12/2022). Restrictions  Restrictions/Precautions  Restrictions/Precautions: Fall Risk,General Precautions  Required Braces or Orthoses?: Yes  Implants present? :  (Lumbar/Neck surgery)  Required Braces or Orthoses  Cervical: c-collar (collar on while out of bed)  Position Activity Restriction  Other position/activity restrictions: ambulate pt; s/p C2-C7 fixation  Subjective   General  Chart Reviewed: Yes  Additional Pertinent Hx: Mary Meng is a 62 y.o. female with history of cauda equina syndrome s/p L2-S1 laminectomy (12/2021), HTN, hypothyroidism, GERD, breast cancer, and anxiety admitted to [facility] on 4/11/2022. She presented for planned surgical intervention for cervical stenosis. She underwent C5 corpectomy with C4-C6 arthrodesis, open reduction of cervical kyphotic deformity, and C2-C7 posterior fusion on 4/12/22 (Dr. Chris Hernandez). She has a cervical collar when out of bed. After her lumbar surgery in Dec 2021, patient had been improving initially with therapy, however overall has regressed. . Pt admitted to rehab unit on 4/15/22. Family / Caregiver Present: No  Pain Screening  Patient Currently in Pain: Yes  Pain Assessment  Pain Assessment: 0-10  Pain Level: 8  Pain Type: Acute pain;Surgical pain  Pain Location: Neck  Pain Orientation: Posterior  Vital Signs  Patient Currently in Pain: Yes       Orientation  Orientation  Overall Orientation Status: Within Functional Limits  Cognition      Objective   Bed mobility  Bridging:  (not indicated at this time)  Supine to Sit: Minimal assistance  Sit to Supine: Minimal assistance  Scooting: Minimal assistance  Transfers  Sit to Stand: Moderate Assistance  Stand to sit: Moderate Assistance  Bed to Chair: Maximum assistance  Comment: Cues for errect posture, decreased coordination noted B LE L LE> R LE.   Ambulation  Ambulation?: Yes  More Ambulation?: No  Ambulation 1  Surface: level tile  Device: Rolling Walker  Other Apparatus: Wheelchair follow  Assistance: Maximum assistance  Quality of Gait: Decreased coordiantion noted at LE as well as trunk. Unsteady steps. Pt fearfull of steps  Gait Deviations: Slow Sharon;Decreased step length;Decreased arm swing  Distance: 30 ft  twice; seated break betweeen (Breaks given PRN.)  Wheelchair Activities  Wheelchair Type: Recliner  Wheelchair Cushion: Standard  Pressure Relief Type: Lateral lean  Level of Assistance for pressure relief activities: Minimal assistance  Wheelchair Parts Management: No  Propulsion: Yes  Propulsion 1  Propulsion: Manual  Level: Level Tile  Method: RUE;LUE  Level of Assistance: Minimal assistance; Moderate assistance  Description/ Details: min A for straight path. mod A for wide turns. Distance: 60 ft, with 2 to 3 rest breaks. Balance  Posture: Fair  Sitting - Static: Good;-  Sitting - Dynamic: Fair  Standing - Static: Fair  Standing - Dynamic: Poor;+  Comments: Standing with rolling walker. Other exercises  Other exercises 1: UBE  5 mins. FWD and BWD Seated  Other exercises 2: Seated bilat. LE 2x10 reps         Comment: Breaks given PRN           Goals  Short term goals  Time Frame for Short term goals: 10 days  Short term goal 1: Pt will perform bed mobility/rolling with SBA  Short term goal 2: Pt will perform transfers with Miryam  Short term goal 3: Pt will ambulate 50 to 100 ft, with rolling walker, Miryam. Short term goal 4: Pt will perform wheelchair mobility for 150 ft, SB stright path, min A for corners. Short term goal 5: Pt able to perform 3 to 5 steps with 2 rails, min/mod A  Long term goals  Time Frame for Long term goals : By DC  Long term goal 1: Pt able to perform sit<>stand transfers, mod-I  Long term goal 2: Pt able to perform pivot transfers at SBA/supervsion. Long term goal 3: Pt able to ambulate with rolling walker 100 to 150 ft , level surfaces, with rolling walker, CGA.   Long term goal 4: Pt able to perform curb step with UE support at [facility] term goal 5: Pt able to perform 5 to 12 steps with 2 rails at min A to improve LE strength/coordination  Long term goal 6: Pt able to propel w/c  distance of 150 ft SBA level surfaces  Long term goal 7: Pt able to ambulate on outside terraine/incline surface at min A with rolling walker  Long term goal 8: Pt able to ambulate distance of 60 to 70 ft for 2MWT to improve fucntion and gait speed. Long term goal 5: Family training for safe functional mobility for DC home  Patient Goals   Patient goals : Be able to walk and do things for myself    Plan    Plan  Times per week: 1.5 hr/day, 5 to 7 days/week  Current Treatment Recommendations: Strengthening,ROM,Balance Training,Endurance Training,Functional Mobility Training,Transfer Training,ADL/Self-care Training,Stair training,Gait [employer] Exercise Program,Safety Education & Training,Patient/Caregiver Education & Training,Equipment Evaluation, Education, & procurement,Neuromuscular Re-education  Safety Devices  Type of devices:  All fall risk precautions in place,Bed alarm in place,Call light within reach,Gait belt,Patient at risk for falls,Left in bed,Nurse notified  Restraints  Initially in place: No     Therapy Time         04/18/22 1015 04/18/22 1310   PT Individual Minutes   Time In 1015 1310   Time Out 1115 1345   Minutes [address] [place], [place]

## 2022-04-19 PROCEDURE — 1180000000 HC REHAB R&B

## 2022-04-19 PROCEDURE — 6370000000 HC RX 637 (ALT 250 FOR IP): Performed by: REGISTERED NURSE

## 2022-04-19 PROCEDURE — 97116 GAIT TRAINING THERAPY: CPT

## 2022-04-19 PROCEDURE — 97530 THERAPEUTIC ACTIVITIES: CPT

## 2022-04-19 PROCEDURE — 97535 SELF CARE MNGMENT TRAINING: CPT

## 2022-04-19 PROCEDURE — 6360000002 HC RX W HCPCS: Performed by: REGISTERED NURSE

## 2022-04-19 PROCEDURE — 99232 SBSQ HOSP IP/OBS MODERATE 35: CPT | Performed by: INTERNAL MEDICINE

## 2022-04-19 PROCEDURE — 97110 THERAPEUTIC EXERCISES: CPT

## 2022-04-19 PROCEDURE — 6370000000 HC RX 637 (ALT 250 FOR IP): Performed by: PHYSICAL MEDICINE & REHABILITATION

## 2022-04-19 PROCEDURE — 6370000000 HC RX 637 (ALT 250 FOR IP): Performed by: INTERNAL MEDICINE

## 2022-04-19 PROCEDURE — 99232 SBSQ HOSP IP/OBS MODERATE 35: CPT | Performed by: PHYSICAL MEDICINE & REHABILITATION

## 2022-04-19 RX ADMIN — ACETAMINOPHEN 650 MG: 325 TABLET ORAL at 05:24

## 2022-04-19 RX ADMIN — FERROUS SULFATE TAB 325 MG (65 MG ELEMENTAL FE) 325 MG: 325 (65 FE) TAB at 09:45

## 2022-04-19 RX ADMIN — METHADONE HYDROCHLORIDE 10 MG: 10 TABLET ORAL at 09:44

## 2022-04-19 RX ADMIN — POLYMYXIN B SULFATE, BACITRACIN ZINC, NEOMYCIN SULFATE: 5000; 3.5; 4 OINTMENT TOPICAL at 20:32

## 2022-04-19 RX ADMIN — BACLOFEN 10 MG: 10 TABLET ORAL at 09:46

## 2022-04-19 RX ADMIN — ACETAMINOPHEN 650 MG: 325 TABLET ORAL at 23:08

## 2022-04-19 RX ADMIN — BUSPIRONE HYDROCHLORIDE 30 MG: 15 TABLET ORAL at 20:31

## 2022-04-19 RX ADMIN — LEVOTHYROXINE SODIUM 88 MCG: 0.09 TABLET ORAL at 05:24

## 2022-04-19 RX ADMIN — BACLOFEN 10 MG: 10 TABLET ORAL at 17:11

## 2022-04-19 RX ADMIN — LISINOPRIL 5 MG: 5 TABLET ORAL at 09:44

## 2022-04-19 RX ADMIN — OXYCODONE HYDROCHLORIDE 10 MG: 10 TABLET ORAL at 05:26

## 2022-04-19 RX ADMIN — DOCUSATE SODIUM 200 MG: 100 CAPSULE, LIQUID FILLED ORAL at 09:45

## 2022-04-19 RX ADMIN — PANTOPRAZOLE SODIUM 40 MG: 40 TABLET, DELAYED RELEASE ORAL at 05:24

## 2022-04-19 RX ADMIN — ENOXAPARIN SODIUM 40 MG: 100 INJECTION SUBCUTANEOUS at 09:47

## 2022-04-19 RX ADMIN — POLYETHYLENE GLYCOL 3350 17 G: 17 POWDER, FOR SOLUTION ORAL at 09:44

## 2022-04-19 RX ADMIN — POLYMYXIN B SULFATE, BACITRACIN ZINC, NEOMYCIN SULFATE: 5000; 3.5; 4 OINTMENT TOPICAL at 09:47

## 2022-04-19 RX ADMIN — DOCUSATE SODIUM 200 MG: 100 CAPSULE, LIQUID FILLED ORAL at 20:31

## 2022-04-19 RX ADMIN — ACETAMINOPHEN 650 MG: 325 TABLET ORAL at 11:04

## 2022-04-19 RX ADMIN — DILTIAZEM HYDROCHLORIDE 180 MG: 180 CAPSULE, COATED, EXTENDED RELEASE ORAL at 09:48

## 2022-04-19 RX ADMIN — FERROUS SULFATE TAB 325 MG (65 MG ELEMENTAL FE) 325 MG: 325 (65 FE) TAB at 17:11

## 2022-04-19 RX ADMIN — METHADONE HYDROCHLORIDE 10 MG: 10 TABLET ORAL at 20:31

## 2022-04-19 RX ADMIN — BACLOFEN 10 MG: 10 TABLET ORAL at 20:31

## 2022-04-19 RX ADMIN — BUSPIRONE HYDROCHLORIDE 30 MG: 15 TABLET ORAL at 09:48

## 2022-04-19 RX ADMIN — BACLOFEN 10 MG: 10 TABLET ORAL at 14:30

## 2022-04-19 RX ADMIN — OXYCODONE HYDROCHLORIDE 10 MG: 10 TABLET ORAL at 19:22

## 2022-04-19 RX ADMIN — OXYCODONE HYDROCHLORIDE 10 MG: 10 TABLET ORAL at 09:47

## 2022-04-19 RX ADMIN — ACETAMINOPHEN 650 MG: 325 TABLET ORAL at 17:11

## 2022-04-19 ASSESSMENT — PAIN SCALES - GENERAL
PAINLEVEL_OUTOF10: 8
PAINLEVEL_OUTOF10: 7
PAINLEVEL_OUTOF10: 7
PAINLEVEL_OUTOF10: 8
PAINLEVEL_OUTOF10: 7
PAINLEVEL_OUTOF10: 6
PAINLEVEL_OUTOF10: 7
PAINLEVEL_OUTOF10: 8
PAINLEVEL_OUTOF10: 7

## 2022-04-19 ASSESSMENT — PAIN DESCRIPTION - PAIN TYPE
TYPE: ACUTE PAIN;SURGICAL PAIN

## 2022-04-19 ASSESSMENT — PAIN DESCRIPTION - LOCATION
LOCATION: NECK

## 2022-04-19 ASSESSMENT — PAIN DESCRIPTION - FREQUENCY: FREQUENCY: CONTINUOUS

## 2022-04-19 ASSESSMENT — PAIN DESCRIPTION - PROGRESSION: CLINICAL_PROGRESSION: GRADUALLY IMPROVING

## 2022-04-19 ASSESSMENT — PAIN - FUNCTIONAL ASSESSMENT: PAIN_FUNCTIONAL_ASSESSMENT: PREVENTS OR INTERFERES WITH ALL ACTIVE AND SOME PASSIVE ACTIVITIES

## 2022-04-19 ASSESSMENT — PAIN DESCRIPTION - ONSET: ONSET: GRADUAL

## 2022-04-19 ASSESSMENT — PAIN DESCRIPTION - ORIENTATION: ORIENTATION: POSTERIOR

## 2022-04-19 ASSESSMENT — PAIN DESCRIPTION - DESCRIPTORS: DESCRIPTORS: CONSTANT;ACHING

## 2022-04-19 NOTE — PROGRESS NOTES
Physical Medicine & Rehabilitation  Progress Note      Subjective:      62year-old female with nontraumatic cervical spinal cord injury secondary to servical stenosis. Patient is reporting improved pain control today. She denies any recurrent issues with muscle spasms and feels they are controlled on increased dose of baclofen without any drowsiness. No new issues with sleep, appetite, or bladder. Has had some small BMs but will try laxative after therapies today to address some continued distention and mild constipation. ROS:  Denies fevers, chills, sweats. No chest pain, palpitations, lightheadedness. Denies coughing, wheezing or shortness of breath. Denies abdominal pain, nausea, diarrhea   No new areas of joint pain. Denies new areas of numbness or weakness. Denies new anxiety or depression issues. No new skin problems. Rehabilitation:   Progressing in therapies. PT:  Restrictions/Precautions: Fall Risk,General Precautions  Implants present? :  (Lumbar/Neck surgery)  Other position/activity restrictions: ambulate pt; s/p C2-C7 fixation  Required Braces or Orthoses  Cervical: c-collar   Transfers  Sit to Stand: Moderate Assistance  Stand to sit: Moderate Assistance  Bed to Chair: Maximum assistance  Comment: Cues for errect posture, decreased coordination noted B LE L LE> R LE. Ambulation 1  Surface: level tile  Device: Rolling Walker  Other Apparatus: Wheelchair follow  Assistance: Maximum assistance  Quality of Gait: Decreased coordiantion noted at LE as well as trunk. Unsteady steps. Pt fearfull of steps  Gait Deviations: Slow Sharon,Decreased step length,Decreased arm swing  Distance: 30 ft  twice; seated break betweeen (Breaks given PRN.)    Transfers  Sit to Stand: Moderate Assistance  Stand to sit: Moderate Assistance  Bed to Chair: Maximum assistance  Comment: Cues for errect posture, decreased coordination noted B LE L LE> R LE.   Ambulation  Ambulation?: Yes  More Ambulation?: No  Ambulation 1  Surface: level tile  Device: Rolling Walker  Other Apparatus: Wheelchair follow  Assistance: Maximum assistance  Quality of Gait: Decreased coordiantion noted at LE as well as trunk. Unsteady steps. Pt fearfull of steps  Gait Deviations: Slow Sharon,Decreased step length,Decreased arm swing  Distance: 30 ft  twice; seated break betweeen (Breaks given PRN.)    Surface: level tile  Ambulation 1  Surface: level tile  Device: Rolling Walker  Other Apparatus: Wheelchair follow  Assistance: Maximum assistance  Quality of Gait: Decreased coordiantion noted at LE as well as trunk. Unsteady steps. Pt fearfull of steps  Gait Deviations: Slow Sharon,Decreased step length,Decreased arm swing  Distance: 30 ft  twice; seated break betweeen (Breaks given PRN.)    OT:  ADL  Equipment Provided: Sock aid,Long-handled sponge,Reacher  Feeding: Setup  Grooming: Minimal assistance  UE Bathing: Stand by assistance  LE Bathing: Minimal assistance (Min A for stability in stance during juanita care)  UE Dressing: Verbal cueing,Minimal assistance  LE Dressing: Minimal assistance  Toileting: Minimal assistance (A provided with toileting hygiene/clothing management)  Additional Comments: Pt completed full shower this date. Seated on tub bench, utilizing hand held shower head and long handled sponge. Pt nust leav on c-collar during shower, changing to spare after shower is complete. Balance  Sitting Balance: Stand by assistance  Standing Balance: Minimal assistance   Standing Balance  Time: AM: 1 min x3; PM: pt able to stand for 1:32 while completing [address] [place] activity of placing golf tees into holes in wheel. Pt with slight posterior LOB, requiring Min A to recover. Pt required verbal cues to lock L knee.    Activity: functional transfers, lower body dressing/bathing, toileting; PM: fine motor coordination/standing tolerance activity  Comment: 1-2 UE support  Functional Mobility  Functional - Mobility Device: Wheelchair  Activity: To/from bathroom  Assist Level: Dependent/Total  Functional Mobility Comments: did not self propel      Bed mobility  Bridging:  (not indicated at this time)  Supine to Sit: Contact guard assistance  Sit to Supine: Minimal assistance  Scooting: Minimal assistance  Comment: HOB slightly elevated   Transfers  Stand Step Transfers: Minimal assistance  Stand Pivot Transfers: Minimal assistance  Sit to stand: Minimal assistance  Stand to sit: Minimal assistance  Transfer Comments: Pt min A for transfers this date with verbal cues required for hand/foot placement during transfers for safety. Toilet Transfers  Toilet - Technique: Stand pivot,To left,To right  Equipment Used: Grab bars  Toilet Transfer: Moderate assistance  Toilet Transfers Comments: Verbal cues for hand placement and safety. L side weakness with LLE buckling. Shower Transfers  Shower - Transfer From: Wheelchair  Shower - Transfer Type: To and From  Shower - Transfer To: Transfer tub bench  Shower - Technique: Stand pivot,To right,To left  Shower Transfers: Contact Guard  Shower Transfers Comments: cues for safety   Wheelchair Bed Transfers  Wheelchair/Bed - Technique: Stand pivot  Equipment Used: Bed,Wheelchair  Level of Asssistance: Minimal assistance  Wheelchair Transfers Comments: Verbal cues for hand placement and safety. L side weakness with LLE buckling. SPEECH:      Objective:  /70   Pulse 79   Temp 98 °F (36.7 °C)   Resp 19   Ht 5' 1\" (1.549 m)   SpO2 96%   BMI 26.83 kg/m²       GEN: Well developed, well nourished, in NAD  HEENT:  NCAT. PERRL. EOMI. Mucous membranes pink and moist. Hard cervical collar in place  PULM:  Clear to ausculation. No rales or rhonchi. Respirations WNL and unlabored. CV:  Regular rate rhythm. No murmurs or gallops. GI:  Abdomen soft. Tender, distended. BS + and equal.    NEUROLOGICAL: A&O x3. Sensation intact to light touch. .   MSK:  Functional ROM BUE and BLEs.  Motor testing 4/5 key muscles BUEs, 3/5 B hip flexion, 3/5 R knee extension, 3-/5 L knee extension, 3-/5 B dorsiflexion . SKIN: Warm dry and intact. Good turgor. EXTREMITIES:  No calf tenderness to palpation. No edema BLEs. PSYCH: Mood WNL. Appropriately interactive. Affect WNL. Diagnostics:     CBC: No results for input(s): WBC, RBC, HGB, HCT, MCV, RDW, PLT in the last 72 hours. BMP:   No results for input(s): NA, K, CL, CO2, PHOS, BUN, CREATININE, CA, GLUCOSE in the last 72 hours. BNP: No results for input(s): BNP in the last 72 hours. PT/INR: No results for input(s): PROTIME, INR in the last 72 hours. APTT: No results for input(s): APTT in the last 72 hours. CARDIAC ENZYMES: No results for input(s): CKMB, CKMBINDEX, TROPONINT in the last 72 hours. Invalid input(s): CKTOTAL;3 troponins   FASTING LIPID PANEL:  Lab Results   Component Value Date    CHOL 198 07/23/2020     (A) 07/23/2020    TRIG 96 07/23/2020     LIVER PROFILE: No results for input(s): AST, ALT, ALB, BILIDIR, BILITOT, ALKPHOS in the last 72 hours.      Current Medications:   Current Facility-Administered Medications: [START ON 4/20/2022] vitamin D3 (CHOLECALCIFEROL) tablet 5,000 Units, 5,000 Units, Oral, Weekly  baclofen (LIORESAL) tablet 10 mg, 10 mg, Oral, 4x Daily  lisinopril (PRINIVIL;ZESTRIL) tablet 5 mg, 5 mg, Oral, Daily  bisacodyl (DULCOLAX) suppository 10 mg, 10 mg, Rectal, Daily PRN  polyethylene glycol (GLYCOLAX) packet 17 g, 17 g, Oral, Daily  senna (SENOKOT) tablet 17.2 mg, 2 tablet, Oral, Daily PRN  acetaminophen (TYLENOL) tablet 650 mg, 650 mg, Oral, Q6H  magnesium hydroxide (MILK OF MAGNESIA) 400 MG/5ML suspension 30 mL, 30 mL, Oral, Daily PRN  ondansetron (ZOFRAN-ODT) disintegrating tablet 4 mg, 4 mg, Oral, Q8H PRN **OR** [DISCONTINUED] ondansetron (ZOFRAN) injection 4 mg, 4 mg, IntraVENous, Q6H PRN  oxyCODONE (ROXICODONE) immediate release tablet 5 mg, 5 mg, Oral, Q4H PRN **OR** oxyCODONE HCl (OXY-IR) immediate release tablet 10 mg, 10 mg, Oral, Q4H PRN  busPIRone (BUSPAR) tablet 30 mg, 30 mg, Oral, BID  dilTIAZem (CARDIZEM CD) extended release capsule 180 mg, 180 mg, Oral, Daily  docusate sodium (COLACE) capsule 200 mg, 200 mg, Oral, BID  enoxaparin (LOVENOX) injection 40 mg, 40 mg, SubCUTAneous, Daily  ferrous sulfate (IRON 325) tablet 325 mg, 325 mg, Oral, BID WC  levothyroxine (SYNTHROID) tablet 88 mcg, 88 mcg, Oral, Daily  methadone (DOLOPHINE) tablet 10 mg, 10 mg, Oral, BID  neomycin-bacitracin-polymyxin (NEOSPORIN) ointment, , Topical, BID  pantoprazole (PROTONIX) tablet 40 mg, 40 mg, Oral, Daily      Impression/Plan:   Impaired ADLs, gait, and mobility due to:      1. Nontraumatic cervical spinal cord injury secondary to cervical stenosis: s/p C5 corpectomy, C4-6 arthrodesis, open reduction cervical kyphosis, C2-7 posterior fusion performed by Dr. Ricky Stewart on 4/12/2022. PT/OT for gait, mobility, strengthening, endurance, ADLs, and self care. Has Tylenol prn, methadone BID, robaxin prn, oxycodone prn for pain. Discontinued Robaxin and changed to routine baclofen for muscle spasms/pain. Dose increased 4/18. 2. Hx cauda equina syndrome: s/p L2-S1 laminectomy (December 2021)  3. HTN: on Diltiazem. 4. Anemia: Hb low but improving. On ferrous sulfate. Monitoring. 5. Hypothyroidism: on levothyroxine. 6. Hyponatremia: stable. Monitoring. Medical management by IM  7. GERD: on pantoprazole. 8. Anxiety:on buspar. 9. Vitamin D deficiency: on repletion weekly  10. Hx breast cancer  11. Bowel Management: Miralax daily, senokot prn, dulcolax prn. Has milk of magnesia prn with no significant result 4/18. She will try dulcolax today  12. DVT Prophylaxis:  low molecular weight heparin, SCD's while in bed and HERMINIO's   13.  Internal medicine for medical management      Electronically signed by Jayne Benitez MD on 4/19/2022 at 9:39 AM      This note is created with the assistance of a speech recognition program.  While intending to generate a document that actually reflects the content of the visit, the document can still have some errors including those of syntax and sound a like substitutions which may escape proof reading. In such instances, actual meaning can be extrapolated by contextual diversion.

## 2022-04-19 NOTE — PLAN OF CARE
Problem: Skin Integrity:  Goal: Will show no infection signs and symptoms  Description: Will show no infection signs and symptoms  4/19/2022 1401 by Nancy Reyes  Outcome: Ongoing   Skin assessment done this shift. Nutrition and fluid intake assessed. Morteza score completed. Bilateral heels elevated while in bed. Pt able to reposition self. Skin integrity intact. No new breakdown found. Problem: Skin Integrity:  Goal: Absence of new skin breakdown  Description: Absence of new skin breakdown  4/19/2022 1401 by Nancy Reyes  Outcome: Ongoing   Skin assessment done this shift. Nutrition and fluid intake assessed. Morteza score completed. Bilateral heels elevated while in bed. Pt able to reposition self. Skin integrity intact. No new breakdown found. Problem: Falls - Risk of:  Goal: Will remain free from falls  Description: Will remain free from falls  4/19/2022 1401 by Nancy Reyes  Outcome: Ongoing   Patient has sustained no falls or injuries at this time. Patient has not tried to get out of bed without nursing assistance. Call light is in reach, side rails are up, bed is at lowest position and locked, proper footwear is in place. Hourly nursing rounds made. Problem: Falls - Risk of:  Goal: Absence of physical injury  Description: Absence of physical injury  4/19/2022 1401 by Nancy Reyes  Outcome: Ongoing   Patient has sustained no falls or injuries at this time. Patient has not tried to get out of bed without nursing assistance. Call light is in reach, side rails are up, bed is at lowest position and locked, proper footwear is in place. Hourly nursing rounds made.      Problem: Discharge Planning:  Goal: Discharged to appropriate level of care  Description: Discharged to appropriate level of care  4/19/2022 1401 by Nancy Reyes  Outcome: Ongoing     Problem: Pain:  Goal: Pain level will decrease  Description: Pain level will decrease  4/19/2022 1401 by Nancy Reyes  Outcome: Ongoing     Problem: Pain:  Goal: Control of acute pain  Description: Control of acute pain  4/19/2022 1401 by Evelin Peter  Outcome: Ongoing   Patient states current medications for pain are effective for back pain. Will continue to monitor and provide non pharmacological interventions     Problem: Pain:  Goal: Control of chronic pain  Description: Control of chronic pain  4/19/2022 1401 by Evelin Peter  Outcome: Ongoing   Patient states current medications for pain are effective for back pain.  Will continue to monitor and provide non pharmacological interventions      Problem: Nutrition  Goal: Optimal nutrition therapy  4/19/2022 1401 by Evelin Peter  Outcome: Ongoing     Problem: Musculor/Skeletal Functional Status  Goal: Highest potential functional level  4/19/2022 1401 by Evelin Peter  Outcome: Ongoing     Problem: Musculor/Skeletal Functional Status  Goal: Absence of falls  4/19/2022 1401 by Evelin Peter  Outcome: Ongoing

## 2022-04-19 NOTE — PLAN OF CARE
Problem: Skin Integrity:  Goal: Will show no infection signs and symptoms  Description: Will show no infection signs and symptoms  Outcome: Ongoing     Problem: Skin Integrity:  Goal: Absence of new skin breakdown  Description: Absence of new skin breakdown  Outcome: Ongoing     Problem: Falls - Risk of:  Goal: Will remain free from falls  Description: Will remain free from falls  Outcome: Ongoing     Problem: Falls - Risk of:  Goal: Absence of physical injury  Description: Absence of physical injury  Outcome: Ongoing     Problem: Discharge Planning:  Goal: Discharged to appropriate level of care  Description: Discharged to appropriate level of care  Outcome: Ongoing     Problem: Pain:  Goal: Pain level will decrease  Description: Pain level will decrease  Outcome: Ongoing     Problem: Pain:  Goal: Control of acute pain  Description: Control of acute pain  Outcome: Ongoing     Problem: Pain:  Goal: Control of chronic pain  Description: Control of chronic pain  Outcome: Ongoing     Problem: Nutrition  Goal: Optimal nutrition therapy  4/19/2022 0341 by Jordon Bush RN  Outcome: Ongoing     Problem: Musculor/Skeletal Functional Status  Goal: Highest potential functional level  Outcome: Ongoing     Problem: Musculor/Skeletal Functional Status  Goal: Absence of falls  Outcome: Ongoing

## 2022-04-19 NOTE — PROGRESS NOTES
Physical Therapy  [address]  Acute Rehabilitation Physical Therapy Progress Note    Date: 22  Patient Name: Corrie Cano       Room: 5074/3550-15  MRN: [id number]   Account: [de-identified]   : 1960  (62 y.o.) Gender: female     Referring Practitioner: Rebecca Serna MD  Diagnosis: Nontraumatic cervical spinal cord injury  Past Medical History:  has a past medical history of Anxiety, Arthritis, At maximum risk for fall, Cancer ([address] Cauda equina syndrome ([address] Cervical stenosis of spine, GERD (gastroesophageal reflux disease), History of stomach ulcers, Hypertension, Hypothyroidism, Lumbar neuritis, Post laminectomy syndrome, Spinal deformity, Thyroid disease, Uses wheelchair, Wears dentures, and Wellness examination. Past Surgical History:   has a past surgical history that includes hernia repair (Bilateral); Breast surgery (Right); Mastectomy, radical; Hysterectomy, total abdominal; Lumbar spine surgery (N/A, 2021); back surgery; Colonoscopy (about ); other surgical history (2022); cervical fusion (N/A, 2022); and cervical fusion (N/A, 2022). Additional Pertinent Hx: Corrie Cano is a 62 y.o. female with history of cauda equina syndrome s/p L2-S1 laminectomy (2021), HTN, hypothyroidism, GERD, breast cancer, and anxiety admitted to [facility] on 2022. She presented for planned surgical intervention for cervical stenosis. She underwent C5 corpectomy with C4-C6 arthrodesis, open reduction of cervical kyphotic deformity, and C2-C7 posterior fusion on 22 (Dr. Irwin Guido). She has a cervical collar when out of bed. After her lumbar surgery in Dec 2021, patient had been improving initially with therapy, however overall has regressed. . Pt admitted to rehab unit on 4/15/22.     Overall Orientation Status: Within Functional Limits  Restrictions/Precautions  Restrictions/Precautions: Fall Risk;General Precautions  Required Braces or Orthoses?: Anesthesia Volume In Cc: 0 Yes  Implants present? :  (Lumbar/Neck surgery)  Required Braces or Orthoses  Cervical: c-collar (aspen collar on while out of bed ok to remove while resting in bed and eating)  Position Activity Restriction  Other position/activity restrictions: ambulate pt; s/p C2-C7 fixation            Vital Signs  Patient Currently in Pain: Yes                   Bed Mobility:   Bed Mobility  Supine to Sit: Minimal assistance  Sit to Supine: Minimal assistance  Scooting: Minimal assistance  Bed mobility  Scooting: Minimal assistance    Transfers:  Sit to Stand: Moderate Assistance  Stand to sit: Moderate Assistance  Bed to Chair: Moderate assistance              Ambulation 1  Surface: level tile  Device: Rolling Walker  Other Apparatus: Wheelchair follow  Assistance: Maximum assistance  Quality of Gait: Decreased coordiantion noted at LE as well as trunk. Unsteady steps. Pt fearfull of steps  Gait Deviations: Slow Sharon;Decreased step length;Decreased arm swing  Distance: 58 ft;  50 ft x2 (Breaks given PRN.)        Stairs/Curb  Stairs?: Yes  Stairs  # Steps : 5  Stairs Height: 4\" (and 6\")  Rails: Bilateral  Device: No Device  Assistance: Minimal assistance  Comment: Completed in a step to pattern. Wheelchair Activities  Wheelchair Type: Recliner  Wheelchair Cushion: Standard  Pressure Relief Type: Lateral lean  Level of Assistance for pressure relief activities: Minimal assistance  Wheelchair Parts Management: No  Propulsion: Yes  Propulsion 1  Propulsion: Manual  Level: Level Tile  Method: RUE;LUE  Level of Assistance: Minimal assistance; Moderate assistance  Description/ Details: min A for straight path. mod A for wide turns. Distance: 60 ft, with 2 to 3 rest breaks. BALANCE Posture: Fair  Sitting - Static: Good;-  Sitting - Dynamic: Fair  Standing - Static: Fair  Standing - Dynamic: Poor;+  Comments: Standing with rolling walker. EXERCISES    Other exercises 1: UBE  5 mins.  FWD and BWD Seated  Other Detail Level: Detailed exercises 2: Seated bilat. LE 2x10 reps  Other Activities  Comment: Breaks given PRN        Activity Tolerance: Patient limited by pain,Patient limited by fatigue,Patient limited by endurance  PT Equipment Recommendations  Other: Pt has rolling walker, rollator, manual wheelchair and lift chair at home           Current Treatment Recommendations: Strengthening,ROM,Balance Training,Endurance Training,Functional Mobility Training,Transfer Training,ADL/Self-care Training,Stair training,Gait [employer] Exercise Program,Safety Education & Training,Patient/Caregiver Education & Training,Equipment Evaluation, Education, & procurement,Neuromuscular Re-education    Conditions Requiring Skilled Therapeutic Intervention  Body structures, Functions, Activity limitations: Decreased functional mobility ; Decreased ADL status; Decreased strength;Decreased endurance;Decreased posture;Decreased balance;Decreased coordination; Increased pain  Assessment: Pt require mod A for bed mobility, max A for sit <> stand and stand weight shifts, Pt able to take 12-13 step RW, mod A +CGA of 2nd person. VC for foot placement and weight shifting, pt will continue to benefit from PT to progress activity and promote safety with gait and transfers  Treatment Diagnosis: B LE/B UE weakness, difficulty walking  Prognosis: Good  Decision Making: High Complexity  Barriers to Learning: none  REQUIRES PT FOLLOW UP: Yes  Discharge Recommendations: Home with assist PRN;Patient would benefit from continued therapy after discharge    Goals  Short term goals  Time Frame for Short term goals: 10 days  Short term goal 1: Pt will perform bed mobility/rolling with SBA  Short term goal 2: Pt will perform transfers with Miryam  Short term goal 3: Pt will ambulate 50 to 100 ft, with rolling walker, Miryam. Short term goal 4: Pt will perform wheelchair mobility for 150 ft, SB stright path, min A for corners.   Short term goal 5: Pt able to perform 3 to 5 steps with 2 rails, min/mod A  Long term goals  Time Frame for Long term goals : By DC  Long term goal 1: Pt able to perform sit<>stand transfers, mod-I  Long term goal 2: Pt able to perform pivot transfers at SBA/supervsion. Long term goal 3: Pt able to ambulate with rolling walker 100 to 150 ft , level surfaces, with rolling walker, CGA. Long term goal 4: Pt able to perform curb step with UE support at [address] term goal 5: Pt able to perform 5 to 12 steps with 2 rails at min A to improve LE strength/coordination  Long term goal 6: Pt able to propel w/c  distance of 150 ft SBA level surfaces  Long term goal 7: Pt able to ambulate on outside terraine/incline surface at min A with rolling walker  Long term goal 8: Pt able to ambulate distance of 60 to 70 ft for 2MWT to improve fucntion and gait speed.   Long term goal 5: Family training for safe functional mobility for DC home       04/19/22 1015 04/19/22 1300   PT Individual Minutes   Time In 1015 1300   Time Out 1100 1330   Minutes 45 30   PT Concurrent Minutes   Time In  --  1330   Time Out  --  2033   Minutes  --  15       Electronically signed by Silvana Hernandez PTA on 4/19/22 at 5:56 PM EDT Post-Care Instructions: I reviewed with the patient in detail post-care instructions. Patient is to wear sunprotection, and avoid picking at any of the treated lesions. Pt may apply Vaseline to crusted or scabbing areas. Consent: The patient's consent was obtained including but not limited to risks of crusting, scabbing, blistering, scarring, darker or lighter pigmentary change, recurrence, incomplete removal and infection.

## 2022-04-19 NOTE — PROGRESS NOTES
[address] [facility] Internal Medicine  Jeffrey Lara MD; Isabel Persaud MD; Jeremiah Thomas MD; Andrew Rutter, MD Cherylene Patten, MD; MD KEENAN Rees [facility] Internal Medicine   [address] / HISTORY AND PHYSICAL EXAMINATION            Date:   4/19/2022  Patient name:  Nico Becerra  Date of admission:  4/15/2022  5:32 PM  MRN:   [id number]  Account:  [de-identified]  YOB: 1960  PCP:    Lazara Cornelius MD  Room:   [address] [place] [address]  Code Status:    Full Code    Physician Requesting Consult: Irena Peters MD    Reason for Consult: Medical management    Chief Complaint:     No chief complaint on file. Underwent anterior C5 corpectomy, posterior fixation from C2-T1 with arthrodesis, osteotomy, correction of deformity on April 12    History Obtained From:     patient    History of Present Illness: The patient is a 61 y.o. female presented for elective surgery. Underwent anterior C5 corpectomy, posterior fixation from C2-T1 with arthrodesis, osteotomy, correction of deformity on 4/12 with Dr. Maurizio Rascon 4/12. Symptoms include significant left-sided paresis, severe lower extremity weakness (wheelchair bound, now unable to stand or ambulate [id number]), progressively worsening paresthesias and dexterity issues with left upper extremity and left lower extremity.  Additionally complains of some moderate saddle anesthesia, urge incontinence but no bowel dysfunction.   Admitted to acute rehab for further management for deconditioning,  4/18   No new complaints  Pain is controlled on methadone  Working with therapy      Past Medical History:     Past Medical History:   Diagnosis Date    Anxiety     Arthritis     At maximum risk for fall 12/29/2021    caudal equina syndrome and cervical stenosis    Cancer ([address]     right breast    Cauda equina syndrome ([address] 12/29/2021    neuropathy, mobility difficulty, incontinance    Cervical stenosis of spine 12/29/2021    GERD (gastroesophageal reflux disease)     History of stomach ulcers     Hypertension     Dr. Lucy Poon    Hypothyroidism     Lumbar neuritis     Post laminectomy syndrome     Spinal deformity 11/2021    cervical and lumbar    Thyroid disease     Uses wheelchair     Wears dentures     Wellness examination     Dr. Lucy Poon        Past Surgical History:     Past Surgical History:   Procedure Laterality Date    BACK SURGERY      lower back x 3, cervical- x 1: C4- C6, 11/4/2014     BREAST SURGERY Right     mastectomy with reconstruction    CERVICAL FUSION N/A 4/12/2022    C5 CORPECTOMY, USE OF INTRAOPERATIVE CERVICAL TRACTION performed by Kristel Simon DO at [facility] N/A 4/12/2022    POSTERIOR FIXATION C2-C7 performed by [facility]el [facility] DO at [facility]  about 2018    HERNIA REPAIR Bilateral     inguinal    HYSTERECTOMY, TOTAL ABDOMINAL      LUMBAR SPINE SURGERY N/A 12/30/2021    LUMBAR LAMINECTOMY L2-S1 performed by Kristel Simon DO at [address] [place] [address] HISTORY  04/12/2022    C5 CORPECTOMY, USE OF INTRAOPERATIVE CERVICAL TRACTION (N/A Spine Cervical)         Medications Prior to Admission:     Prior to Admission medications    Medication Sig Start Date End Date Taking?  Authorizing Provider   dilTIAZem (DILACOR XR) 180 MG extended release capsule TAKE ONE CAPSULE BY MOUTH DAILY 4/1/22   Tao Rutledge MD   ferrous sulfate (IRON 325) 325 (65 Fe) MG tablet Take 1 tablet by mouth 2 times daily (with meals) 1/13/22   Milly Sherman MD   docusate sodium (COLACE) 100 MG capsule Take 2 capsules by mouth 2 times daily 1/13/22   Milly Sherman MD   pantoprazole (PROTONIX) 40 MG tablet TAKE ONE TABLET BY MOUTH DAILY 12/20/21   Farrukh Giron MD   busPIRone (BUSPAR) 30 MG tablet Take 30 mg by mouth 2 times daily 12/17/21   Farrukh Giron MD   levothyroxine (SYNTHROID) 88 MCG tablet Take 1 tablet by mouth Daily 12/17/21 3/18/22  Gypsy Baron MD   tiZANidine (ZANAFLEX) 4 MG tablet Take 4 mg by mouth every 12 hours    Historical Provider, MD   Vitamin D, Ergocalciferol, 50 MCG (2000 UT) CAPS TAKE ONE CAPSULE BY MOUTH DAILY 3/29/21   Rolando Gunn PA-C   HYDROcodone-acetaminophen [facility]) 7.5-325 MG per tablet Up to three tablets a day. 10/21/15   Historical Provider, MD   methadone (DOLOPHINE) 10 MG tablet Take 10 mg by mouth 2 times daily. Historical Provider, MD        Allergies:     Latex and Kiwi extract    Social History:     Tobacco:    reports that she quit smoking about 8 years ago. She has a 15.00 pack-year smoking history. She has never used smokeless tobacco.  Alcohol:      reports current alcohol use. Drug Use:  reports no history of drug use. Family History:     Family History   Problem Relation Age of Onset    Hypertension Mother     Heart Disease Mother     Cancer Mother        Review of Systems:     Positive and Negative as described in HPI. CONSTITUTIONAL:  negative for fevers, chills, sweats, fatigue, weight loss  HEENT:  negative for vision, hearing changes, runny nose, throat pain  RESPIRATORY:  negative for shortness of breath, cough, congestion, wheezing. CARDIOVASCULAR:  negative for chest pain, palpitations.   GASTROINTESTINAL:  negative for nausea, vomiting, diarrhea, constipation, change in bowel habits, abdominal pain   GENITOURINARY:  negative for difficulty of urination, burning with urination, frequency   INTEGUMENT:  negative for rash, skin lesions, easy bruising   HEMATOLOGIC/LYMPHATIC:  negative for swelling/edema   ALLERGIC/IMMUNOLOGIC:  negative for urticaria , itching  ENDOCRINE:  negative increase in drinking, increase in urination, hot or cold intolerance  MUSCULOSKELETAL:  negative joint pains, muscle aches, swelling of joints  NEUROLOGICAL:  negative for headaches, dizziness, lightheadedness, numbness, pain, tingling extremities  BEHAVIOR/PSYCH: negative for depression, anxiety    Physical Exam:     /70   Pulse 79   Temp 98 °F (36.7 °C)   Resp 19   Ht 5' 1\" (1.549 m)   SpO2 96%   BMI 26.83 kg/m²   Temp (24hrs), Av.2 °F (36.8 °C), Min:98 °F (36.7 °C), Max:98.4 °F (36.9 °C)    No results for input(s): POCGLU in the last 72 hours. Intake/Output Summary (Last 24 hours) at 2022 1602  Last data filed at 2022 1042  Gross per 24 hour   Intake 120 ml   Output --   Net 120 ml       General Appearance:  alert, well appearing, and in no acute distress  Mental status: oriented to person, place, and time with normal affect  Head:  normocephalic, atraumatic. Eye: no icterus, redness, pupils equal and reactive, extraocular eye movements intact, conjunctiva clear  Ear: normal external ear, no discharge, hearing intact  Nose:  no drainage noted  Mouth: mucous membranes moist  Neck: supple, no carotid bruits, thyroid not palpable  Lungs: Bilateral equal air entry, clear to ausculation, no wheezing, rales or rhonchi, normal effort  Cardiovascular: normal rate, regular rhythm, no murmur, gallop, rub. Abdomen: Soft, nontender, nondistended, normal bowel sounds, no hepatomegaly or splenomegaly  Neurologic: There are no new focal motor or sensory deficits, normal muscle tone and bulk, no abnormal sensation, normal speech, cranial nerves II through XII grossly intact  Skin: No gross lesions, rashes, bruising or bleeding on exposed skin area  Extremities:  peripheral pulses palpable, no pedal edema or calf pain with palpation  Psych: normal affect    Investigations:      Laboratory Testing:  No results found for this or any previous visit (from the past 24 hour(s)). Imaging/Diagonstics:  XR CERVICAL SPINE (2-3 VIEWS)    Result Date: 2022  Expected postoperative findings status post anterior and posterior cervical fusion. XR CHEST PORTABLE    Result Date: 2022  No acute cardiopulmonary disease.      XR CERVICAL SPINE FLEXION AND EXTENSION    Result Date: 4/13/2022  1. Retrolisthesis of C2 on C3 measures 2 mm on extension view and reduces on flexion view. 2. No evidence of instability of anterolisthesis of C3 on C4 measuring 1.5 mm. 3. No acute fracture on limited views. Of note, the C6-C7 and C7-T1 levels are not well seen due to overlying soft tissues. 4. Severe multilevel degenerative changes. CTA NECK W CONTRAST    Result Date: 4/12/2022  Unremarkable CTA of the neck. Minor hazy pleural thickening and chronic-appearing medial right upper lobe atelectasis/scarring. If there is any clinical concern, follow-up noncontrast chest CT may be useful. RECOMMENDATIONS: Unavailable       Assessment :      Hospital Problems           Last Modified POA    * (Principal) Spinal cord injury, cervical region, sequela ([facility] [address] 4/15/2022 Yes    Essential hypertension 4/16/2022 Yes    Hypothyroidism 4/16/2022 Yes    Post-menopausal osteoporosis 4/16/2022 Yes    Cervical myelopathy ([facility] [address] [facility] 4/16/2022 Yes    Anemia, normocytic normochromic 4/16/2022 Yes          Plan:     1. Spinal cord injury status post anterior C5 corpectomy, posterior fixation from C2-T1 with arthrodesis, osteotomy,  2. Essential hypertension, continue monitor blood pressure, continue home medications, added lisinopril, diltiazem,Controlled   3. Hyponatremia, better now ,  4. Constipation , prn meds   5. Hypothyroidism, continue levothyroxine,  6. Continue physical therapy,  7. DVT prophylaxis    Consultations:   Gisselle Alcantara  IP CONSULT TO SOCIAL WORK  IP CONSULT TO INTERNAL MEDICINE      Esequiel Dangelo MD  4/19/2022  4:02 PM    Copy sent to Dr. Bianka Lopez MD    Please note that this chart was generated using voice recognition Dragon dictation software. Although every effort was made to ensure the accuracy of this automated transcription, some errors in transcription may have occurred.

## 2022-04-19 NOTE — PROGRESS NOTES
[address] [facility]    ACUTE REHABILITATION OCCUPATIONAL THERAPY  DAILY NOTE    Date: 22  Patient Name: Brandi Wang      Room: 6800/1123-97    MRN: [id number]   : 1960  (62 y.o.)  Gender: female   Referring Practitioner: Jacqueline Buenrostro MD  Diagnosis: Nontraumatic cervical spinal cord injury       Restrictions  Restrictions/Precautions: Fall Risk,General Precautions  Implants present? :  (Lumbar/neck surgery)  Other position/activity restrictions: ambulate pt; s/p C2-C7 fixation  Required Braces or Orthoses  Cervical: c-collar  Required Braces or Orthoses?: Yes  Equipment Used: Bed,Wheelchair    Subjective  Subjective: \" I can use my own shampoo! \"   Comments: Pt is pleasant and motivated for therapy   Patient Currently in Pain: Yes  Pain Level: 7  Pain Location: Neck  Restrictions/Precautions: Fall Risk;General Precautions  Overall Orientation Status: Within Functional Limits  Patient Observation  Observations: Pt is pleasant and motivated   Pain Assessment  Pain Assessment: 0-10  Pain Level: 7  Pain Type: Acute pain,Surgical pain  Pain Location: Neck    Objective  Cognition  Overall Cognitive Status: WFL  Perception  Overall Perceptual Status: WFL  Balance  Sitting Balance: Stand by assistance  Standing Balance: Minimal assistance  Bed mobility  Supine to Sit: Contact guard assistance  Comment: HOB slightly elevated   Standing Balance  Time: 1-2 min x3   Activity: transfers ,ADL activites   Comment: 1-2 UE support  Functional Mobility  Functional - Mobility Device: Wheelchair  Activity: To/from bathroom  Assist Level: Dependent/Total  Functional Mobility Comments: did not self propel   Toilet Transfers  Toilet - Technique: Stand pivot; To left; To right  Equipment Used: Grab bars  Toilet Transfer: Minimal assistance  Shower Transfers  Shower - Transfer From: Wheelchair  Shower - Transfer Type: To and From  Shower - Transfer To: Transfer tub bench  Shower - Technique: Stand pivot; To right; To left  Shower Transfers: Contact Guard  Shower Transfers Comments: cues for safety   Fine Motor: Pt engaged in BUE ine motor task of placing golf tees into rotating platform. Pt also completing finger web activity with BUE. Actiivites completed to increase strength and endurance for increased independence in ADL and IADL activites . ADL  Feeding: Setup  Grooming: Minimal assistance ( seated at sink for oral and hair care)   UE Bathing: Stand by assistance( seated to complete)   LE Bathing: Minimal assistance( Standing for juanita care)   UE Dressing: Verbal cueing;Minimal assistance ( assist for maneuvering collar and [place] shirt)   LE Dressing: Minimal assistance( assist adjusting pants, and socks)   Toileting: Minimal assistance  Additional Comments: Pt completed full shower this date. Seated on tub bench, utilizing hand held shower head and long handled sponge. Pt nust leav on c-collar during shower, changing to spare after shower is complete. Assessment  Performance deficits / Impairments: Decreased ADL status; Decreased functional mobility ; Decreased ROM; Decreased strength;Decreased safe awareness;Decreased endurance;Decreased balance;Decreased high-level IADLs;Decreased fine motor control;Decreased coordination;Decreased posture  Prognosis: Good  Discharge Recommendations: Patient would benefit from continued therapy after discharge;Home with assist PRN  Activity Tolerance: Patient Tolerated treatment well  Safety Devices in place: Yes  Type of devices: All fall risk precautions in place;Call light within reach;Gait belt;Patient at risk for falls;Nurse notified; Left in chair          Patient Education:  Patient Goals   Patient goals : \"To get my body that way so that I can take care of things on my own. \"  Learner:patient  Method: demonstration and explanation       Outcome: acknowledged understanding         Plan  Plan  Times per week: 5-7  Times per day: Twice a day  Current Treatment Recommendations: Self-Care / ADL,Strengthening,Balance Training,Functional Mobility Training,Endurance Training,Pain Management,Safety Education & Training,Patient/Caregiver Education & Training,Equipment Evaluation, Education, & procurement,Home Management Training  Patient Goals   Patient goals : \"To get my body that way so that I can take care of things on my own. \"  Short term goals  Time Frame for Short term goals: By 1 week  Short term goal 1: Pt will complete lower body dressing/bathing with min A and Good safety with use of AE as needed  Short term goal 2: Pt will complete upper body dressing with CGA  and Good safety while maintaining cervical precautions  Short term goal 3: Pt will complete functional transfers during self care tasks with min A and Good safety  Short term goal 4: Pt will tolerate standing 4+ minutes during functional activity of choice with min A and Good safety  Short term goal 5: Pt will verbalize/demonstrate Good understanding of cervical precautions during self care tasks with increase safety and independence with daily activities  Short term goal 6: Pt will participate in 30+ mintues of therapeutic exercises/functional activities to increase safety and independence with self care and mobility  Long term goals  Time Frame for Long term goals : By discharge  Long term goal 1: Pt will complete BADLs with modified independence and Good safety while maintaining cervical precautions  Long term goal 2: Pt will complete functional transfers during self care tasks with modified independence with Good safety   Long term goal 3: Pt will tolerate standing 8+ mintues during functional activity of choice with Good safety  Long term goal 4: Pt will verbalize/demonstrate Good understanding of home safety/fall prevention strategies to increase safety and independence with self care and mobility  Long term goal 5: Pt will verbalize/demonstrate Good understanding of adaptive strategies/AE/DME to assisit with maintaining cervical precautions and increase safety and independence with self care and mobility  Long term goals 6: Pt will complete simple meal prep/light house keeping tasks with CGA and Good safety  Long term goal 7: OT to assess FM and  strength        04/19/22 0757   OT Individual Minutes   Time In 9608   Time Out 0930   Minutes 93     Electronically signed by SHERLYN Nagel on 4/19/22 at 2:19 PM EDT

## 2022-04-19 NOTE — FLOWSHEET NOTE
Patient talked about her progress in rehab and the war in [place]; listening presence; welcomed prayer     04/19/22 1952   Encounter Summary   Services provided to: Patient   Referral/Consult From: [facility]   (4/19/22)   Complexity of Encounter Moderate   Length of Encounter 15 minutes   Spiritual Assessment Completed Yes   Spiritual/Buddhist   Type Spiritual support   Assessment Approachable; Hopeful;Coping;Helplessness   Intervention Active listening;Explored feelings, thoughts, concerns;Prayer;Sustaining presence/ Ministry of presence; Discussed illness/injury and it's impact; Discussed belief system/Uatsdin practices/miroslava   Outcome Expressed gratitude;Engaged in conversation;Expressed feelings/needs/concerns;Coping; Hopeful;Receptive

## 2022-04-20 PROCEDURE — 97530 THERAPEUTIC ACTIVITIES: CPT

## 2022-04-20 PROCEDURE — 6370000000 HC RX 637 (ALT 250 FOR IP): Performed by: REGISTERED NURSE

## 2022-04-20 PROCEDURE — 1180000000 HC REHAB R&B

## 2022-04-20 PROCEDURE — 97535 SELF CARE MNGMENT TRAINING: CPT

## 2022-04-20 PROCEDURE — 6370000000 HC RX 637 (ALT 250 FOR IP): Performed by: PHYSICAL MEDICINE & REHABILITATION

## 2022-04-20 PROCEDURE — 6370000000 HC RX 637 (ALT 250 FOR IP): Performed by: INTERNAL MEDICINE

## 2022-04-20 PROCEDURE — 97110 THERAPEUTIC EXERCISES: CPT

## 2022-04-20 PROCEDURE — 6360000002 HC RX W HCPCS: Performed by: REGISTERED NURSE

## 2022-04-20 PROCEDURE — 97116 GAIT TRAINING THERAPY: CPT

## 2022-04-20 PROCEDURE — 99232 SBSQ HOSP IP/OBS MODERATE 35: CPT | Performed by: INTERNAL MEDICINE

## 2022-04-20 PROCEDURE — 99232 SBSQ HOSP IP/OBS MODERATE 35: CPT | Performed by: PHYSICAL MEDICINE & REHABILITATION

## 2022-04-20 RX ADMIN — FERROUS SULFATE TAB 325 MG (65 MG ELEMENTAL FE) 325 MG: 325 (65 FE) TAB at 16:44

## 2022-04-20 RX ADMIN — ACETAMINOPHEN 650 MG: 325 TABLET ORAL at 05:49

## 2022-04-20 RX ADMIN — BUSPIRONE HYDROCHLORIDE 30 MG: 15 TABLET ORAL at 08:29

## 2022-04-20 RX ADMIN — DOCUSATE SODIUM 200 MG: 100 CAPSULE, LIQUID FILLED ORAL at 07:55

## 2022-04-20 RX ADMIN — POLYMYXIN B SULFATE, BACITRACIN ZINC, NEOMYCIN SULFATE: 5000; 3.5; 4 OINTMENT TOPICAL at 21:08

## 2022-04-20 RX ADMIN — Medication 5000 UNITS: at 12:06

## 2022-04-20 RX ADMIN — BACLOFEN 10 MG: 10 TABLET ORAL at 13:58

## 2022-04-20 RX ADMIN — OXYCODONE HYDROCHLORIDE 5 MG: 5 TABLET ORAL at 16:52

## 2022-04-20 RX ADMIN — ACETAMINOPHEN 650 MG: 325 TABLET ORAL at 23:58

## 2022-04-20 RX ADMIN — ENOXAPARIN SODIUM 40 MG: 100 INJECTION SUBCUTANEOUS at 07:54

## 2022-04-20 RX ADMIN — BACLOFEN 10 MG: 10 TABLET ORAL at 08:30

## 2022-04-20 RX ADMIN — ACETAMINOPHEN 650 MG: 325 TABLET ORAL at 14:01

## 2022-04-20 RX ADMIN — BUSPIRONE HYDROCHLORIDE 30 MG: 15 TABLET ORAL at 21:09

## 2022-04-20 RX ADMIN — PANTOPRAZOLE SODIUM 40 MG: 40 TABLET, DELAYED RELEASE ORAL at 05:49

## 2022-04-20 RX ADMIN — DILTIAZEM HYDROCHLORIDE 180 MG: 180 CAPSULE, COATED, EXTENDED RELEASE ORAL at 08:29

## 2022-04-20 RX ADMIN — DOCUSATE SODIUM 200 MG: 100 CAPSULE, LIQUID FILLED ORAL at 21:06

## 2022-04-20 RX ADMIN — LEVOTHYROXINE SODIUM 88 MCG: 0.09 TABLET ORAL at 05:49

## 2022-04-20 RX ADMIN — OXYCODONE HYDROCHLORIDE 10 MG: 10 TABLET ORAL at 11:59

## 2022-04-20 RX ADMIN — BISACODYL 10 MG: 10 SUPPOSITORY RECTAL at 16:45

## 2022-04-20 RX ADMIN — METHADONE HYDROCHLORIDE 10 MG: 10 TABLET ORAL at 21:06

## 2022-04-20 RX ADMIN — BACLOFEN 10 MG: 10 TABLET ORAL at 21:07

## 2022-04-20 RX ADMIN — METHADONE HYDROCHLORIDE 10 MG: 10 TABLET ORAL at 10:16

## 2022-04-20 RX ADMIN — LISINOPRIL 5 MG: 5 TABLET ORAL at 10:20

## 2022-04-20 RX ADMIN — FERROUS SULFATE TAB 325 MG (65 MG ELEMENTAL FE) 325 MG: 325 (65 FE) TAB at 07:55

## 2022-04-20 RX ADMIN — BACLOFEN 10 MG: 10 TABLET ORAL at 16:45

## 2022-04-20 RX ADMIN — OXYCODONE HYDROCHLORIDE 10 MG: 10 TABLET ORAL at 21:06

## 2022-04-20 ASSESSMENT — PAIN DESCRIPTION - LOCATION
LOCATION: NECK
LOCATION: NECK;SHOULDER
LOCATION: NECK
LOCATION: NECK;SHOULDER
LOCATION: NECK
LOCATION: NECK;SHOULDER
LOCATION: NECK
LOCATION: SHOULDER;NECK
LOCATION: NECK

## 2022-04-20 ASSESSMENT — PAIN SCALES - GENERAL
PAINLEVEL_OUTOF10: 8
PAINLEVEL_OUTOF10: 6
PAINLEVEL_OUTOF10: 7
PAINLEVEL_OUTOF10: 6
PAINLEVEL_OUTOF10: 7
PAINLEVEL_OUTOF10: 6
PAINLEVEL_OUTOF10: 8
PAINLEVEL_OUTOF10: 9
PAINLEVEL_OUTOF10: 8
PAINLEVEL_OUTOF10: 2
PAINLEVEL_OUTOF10: 8
PAINLEVEL_OUTOF10: 7
PAINLEVEL_OUTOF10: 6
PAINLEVEL_OUTOF10: 7
PAINLEVEL_OUTOF10: 6
PAINLEVEL_OUTOF10: 8
PAINLEVEL_OUTOF10: 5
PAINLEVEL_OUTOF10: 8
PAINLEVEL_OUTOF10: 8
PAINLEVEL_OUTOF10: 6
PAINLEVEL_OUTOF10: 7
PAINLEVEL_OUTOF10: 9
PAINLEVEL_OUTOF10: 6
PAINLEVEL_OUTOF10: 8
PAINLEVEL_OUTOF10: 9

## 2022-04-20 ASSESSMENT — PAIN DESCRIPTION - DESCRIPTORS
DESCRIPTORS: ACHING
DESCRIPTORS: ACHING;CONSTANT
DESCRIPTORS: DISCOMFORT
DESCRIPTORS: JABBING;ACHING
DESCRIPTORS: ACHING
DESCRIPTORS: ACHING;DISCOMFORT
DESCRIPTORS: ACHING
DESCRIPTORS: ACHING
DESCRIPTORS: DULL
DESCRIPTORS: ACHING
DESCRIPTORS: DISCOMFORT

## 2022-04-20 ASSESSMENT — PAIN DESCRIPTION - FREQUENCY
FREQUENCY: INTERMITTENT
FREQUENCY: CONTINUOUS
FREQUENCY: CONTINUOUS
FREQUENCY: INTERMITTENT
FREQUENCY: CONTINUOUS

## 2022-04-20 ASSESSMENT — PAIN DESCRIPTION - PROGRESSION: CLINICAL_PROGRESSION: GRADUALLY IMPROVING

## 2022-04-20 ASSESSMENT — PAIN - FUNCTIONAL ASSESSMENT
PAIN_FUNCTIONAL_ASSESSMENT: PREVENTS OR INTERFERES SOME ACTIVE ACTIVITIES AND ADLS
PAIN_FUNCTIONAL_ASSESSMENT: PREVENTS OR INTERFERES SOME ACTIVE ACTIVITIES AND ADLS

## 2022-04-20 ASSESSMENT — PAIN DESCRIPTION - ORIENTATION
ORIENTATION: POSTERIOR
ORIENTATION: ANTERIOR;POSTERIOR
ORIENTATION: ANTERIOR;POSTERIOR

## 2022-04-20 ASSESSMENT — PAIN DESCRIPTION - ONSET
ONSET: GRADUAL
ONSET: GRADUAL

## 2022-04-20 NOTE — PROGRESS NOTES
Physical Medicine & Rehabilitation  Progress Note      Subjective:      Patient is a 50-year-old female with a nontraumatic cervical spinal injury secondary to cervical stenosis. Originally presented to the hospital 4/11/2022 with cervical stenosis and myelopathy, with a cervical kyphotic deformity and incomplete quadriplegia. She was taken for C5 corpectomy, and posterior fixation C2-C7. No acute events overnight. She denies any recurrent issues of muscle spasm, and feels better controlled with the baclofen dose without any drowsiness. She has noted no issues with sleep, appetite, bladder. Laxatives been helping with her bowel movements. Besides the neck pain at the incisional site, she does not have any other further complaints. ROS:  Denies fevers, chills, sweats. No chest pain, palpitations, lightheadedness. Denies coughing, wheezing or shortness of breath. Denies abdominal pain, nausea, diarrhea or constipation. No new areas of joint pain. Denies new areas of numbness or weakness. Denies new anxiety or depression issues. No new skin problems. Rehabilitation:   Progressing in therapies. PT:  Restrictions/Precautions: Fall Risk,General Precautions  Implants present? :  (Lumbar/Neck surgery)  Other position/activity restrictions: ambulate pt; s/p C2-C7 fixation  Required Braces or Orthoses  Cervical: c-collar (aspen collar on while out of bed ok to remove while resting )   Transfers  Sit to Stand: Moderate Assistance  Stand to sit: Moderate Assistance  Bed to Chair: Moderate assistance  Comment: Cues for errect posture, decreased coordination noted B LE L LE> R LE. Ambulation  Surface: level tile  Device: Rolling Walker  Other Apparatus: Wheelchair follow  Assistance: Minimal assistance  Quality of Gait: Pt demos difficulties with coordination.  Pt demos an ataxic   Gait Deviations: Slow Sharon,Decreased step length,Decreased arm swing  Distance:  (Breaks given PRN.)  More Ambulation?: No    Transfers  Sit to Stand: Moderate Assistance  Stand to sit: Moderate Assistance  Bed to Chair: Moderate assistance  Comment: Cues for errect posture, decreased coordination noted B LE L LE> R LE. Ambulation  Surface: level tile  Device: Rolling Walker  Other Apparatus: Wheelchair follow  Assistance: Minimal assistance  Quality of Gait: Pt demos difficulties with coordination. Pt demos an ataxic   Gait Deviations: Slow Sharon,Decreased step length,Decreased arm swing  Distance:  (Breaks given PRN.)  More Ambulation?: No    Surface: level tile  Ambulation  Surface: level tile  Device: Rolling Walker  Other Apparatus: Wheelchair follow  Assistance: Minimal assistance  Quality of Gait: Pt demos difficulties with coordination. Pt demos an ataxic   Gait Deviations: Slow Sharon,Decreased step length,Decreased arm swing  Distance:  (Breaks given PRN.)  More Ambulation?: No    OT:  ADL  Equipment Provided: Sock aid,Long-handled sponge,Reacher  Feeding: Setup  Grooming: Minimal assistance  UE Bathing: Stand by assistance  LE Bathing: Minimal assistance  UE Dressing: Verbal cueing,Minimal assistance (standing during juanita care )  LE Dressing: Minimal assistance  Toileting: Minimal assistance  Additional Comments: Pt completed full shower this date. Seated on tub bench, utilizing hand held shower head and long handled sponge. Pt nust leav on c-collar during shower, changing to spare after shower is complete.            Balance  Sitting Balance: Stand by assistance  Standing Balance: Minimal assistance   Standing Balance  Time: 1-2 min x3   Activity: transfers ,ADL activites   Comment: 1-2 UE support  Functional Mobility  Functional - Mobility Device: Wheelchair  Activity: To/from bathroom  Assist Level: Dependent/Total  Functional Mobility Comments: did not self propel      Bed mobility  Bridging:  (not indicated at this time)  Supine to Sit: Contact guard assistance  Sit to Supine: Minimal assistance  Scooting: Minimal assistance  Comment: PT mat, wedge, 3 pillows. Transfers  Stand Step Transfers: Minimal assistance  Stand Pivot Transfers: Minimal assistance  Sit to stand: Minimal assistance  Stand to sit: Minimal assistance  Transfer Comments: Pt min A for transfers this date with verbal cues required for hand/foot placement during transfers for safety. Toilet Transfers  Toilet - Technique: Stand pivot,To left,To right  Equipment Used: Grab bars  Toilet Transfer: Minimal assistance  Toilet Transfers Comments: Verbal cues for hand placement and safety. L side weakness with LLE buckling. Shower Transfers  Shower - Transfer From: Wheelchair  Shower - Transfer Type: To and From  Shower - Transfer To: Transfer tub bench  Shower - Technique: Stand pivot,To right,To left  Shower Transfers: Contact Guard  Shower Transfers Comments: cues for safety   Wheelchair Bed Transfers  Wheelchair/Bed - Technique: Stand pivot  Equipment Used: Bed,Wheelchair  Level of Asssistance: Minimal assistance  Wheelchair Transfers Comments: Verbal cues for hand placement and safety. L side weakness with LLE buckling. SPEECH:      Objective:  BP (!) 145/84   Pulse 92   Temp 98.5 °F (36.9 °C) (Oral)   Resp 14   Ht 5' 1\" (1.549 m)   SpO2 99%   BMI 26.83 kg/m²       GEN: well developed, well nourished, NAD  HEENT: NCAT, PERRL, EOMI, mucous membranes pink and moist  CV: RRR, no murmurs, rubs or gallops  PULM: CTAB, no rales or rhonchi. Respirations WNL and unlabored  ABD: soft, NT, ND, BS+ and equal  NEURO: A&O x3. Sensation intact to light touch. DTRs 2+. MSK:Functional ROM BUE and BLEs. Motor testing 4/5 key muscles BUEs, 4/5 B hip flexion, 4/5 R knee extension, 3-/5 L knee extension, 3-/5 B dorsiflexion . EXTREMITIES: No calf tenderness to palpation bilaterally. No edema BLEs  SKIN: warm dry and intact with good turgor  PSYCH: appropriately interactive. Affect WNL.      Diagnostics:     CBC: No results for input(s): WBC, RBC, HGB, HCT, MCV, RDW, PLT in the last 72 hours. BMP: No results for input(s): NA, K, CL, CO2, PHOS, BUN, CREATININE, CA, GLUCOSE in the last 72 hours. BNP: No results for input(s): BNP in the last 72 hours. PT/INR: No results for input(s): PROTIME, INR in the last 72 hours. APTT: No results for input(s): APTT in the last 72 hours. CARDIAC ENZYMES: No results for input(s): CKMB, CKMBINDEX, TROPONINT in the last 72 hours. Invalid input(s): CKTOTAL;3 troponins   FASTING LIPID PANEL:  Lab Results   Component Value Date    CHOL 198 07/23/2020     (A) 07/23/2020    TRIG 96 07/23/2020     LIVER PROFILE: No results for input(s): AST, ALT, ALB, BILIDIR, BILITOT, ALKPHOS in the last 72 hours.      Current Medications:   Current Facility-Administered Medications: vitamin D3 (CHOLECALCIFEROL) tablet 5,000 Units, 5,000 Units, Oral, Weekly  baclofen (LIORESAL) tablet 10 mg, 10 mg, Oral, 4x Daily  lisinopril (PRINIVIL;ZESTRIL) tablet 5 mg, 5 mg, Oral, Daily  bisacodyl (DULCOLAX) suppository 10 mg, 10 mg, Rectal, Daily PRN  polyethylene glycol (GLYCOLAX) packet 17 g, 17 g, Oral, Daily  senna (SENOKOT) tablet 17.2 mg, 2 tablet, Oral, Daily PRN  acetaminophen (TYLENOL) tablet 650 mg, 650 mg, Oral, Q6H  magnesium hydroxide (MILK OF MAGNESIA) 400 MG/5ML suspension 30 mL, 30 mL, Oral, Daily PRN  ondansetron (ZOFRAN-ODT) disintegrating tablet 4 mg, 4 mg, Oral, Q8H PRN **OR** [DISCONTINUED] ondansetron (ZOFRAN) injection 4 mg, 4 mg, IntraVENous, Q6H PRN  oxyCODONE (ROXICODONE) immediate release tablet 5 mg, 5 mg, Oral, Q4H PRN **OR** oxyCODONE HCl (OXY-IR) immediate release tablet 10 mg, 10 mg, Oral, Q4H PRN  busPIRone (BUSPAR) tablet 30 mg, 30 mg, Oral, BID  dilTIAZem (CARDIZEM CD) extended release capsule 180 mg, 180 mg, Oral, Daily  docusate sodium (COLACE) capsule 200 mg, 200 mg, Oral, BID  enoxaparin (LOVENOX) injection 40 mg, 40 mg, SubCUTAneous, Daily  ferrous sulfate (IRON 325) tablet 325 mg, 325 mg, Oral, BID WC  levothyroxine (SYNTHROID) tablet 88 mcg, 88 mcg, Oral, Daily  methadone (DOLOPHINE) tablet 10 mg, 10 mg, Oral, BID  neomycin-bacitracin-polymyxin (NEOSPORIN) ointment, , Topical, BID  pantoprazole (PROTONIX) tablet 40 mg, 40 mg, Oral, Daily      Impression/Plan:   Impaired ADLs, gait, and mobility due to:        1. Nontraumatic cervical spinal cord injury secondary to cervical stenosis: s/p C5 corpectomy, C4-6 arthrodesis, open reduction cervical kyphosis, C2-7 posterior fusion performed by Dr. Marla Lynch on 4/12/2022.  PT/OT for gait, mobility, strengthening, endurance, ADLs, and self care. Has Tylenol prn, methadone BID, robaxin prn, oxycodone prn for pain. Discontinued Robaxin and changed to routine baclofen for muscle spasms/pain. Dose increased 4/18. 2. Hx cauda equina syndrome: s/p L2-S1 laminectomy (December 2021)  3. HTN: on Diltiazem. 4. Anemia: Hb low but improving. On ferrous sulfate. Monitoring. 5. Hypothyroidism: on levothyroxine. 6. Hyponatremia: stable. Monitoring. Medical management by IM  7. GERD: on pantoprazole. 8. Anxiety:on buspar. 9. Vitamin D deficiency: on repletion weekly  10. Hx breast cancer  11. Bowel Management: Miralax daily, senokot prn, dulcolax prn. Has milk of magnesia prn with no significant result 4/18. She will try dulcolax today  12. DVT Prophylaxis:  low molecular weight heparin, SCD's while in bed and HERMINIO's   13. Internal medicine for medical management        Electronically signed by Ignacio Recinos DO on 4/20/2022 at 11:26 AM      This note is created with the assistance of a speech recognition program.  While intending to generate a document that actually reflects the content of the visit, the document can still have some errors including those of syntax and sound a like substitutions which may escape proof reading. In such instances, actual meaning can be extrapolated by contextual diversion.

## 2022-04-20 NOTE — PROGRESS NOTES
Physical Therapy  [address]  Acute Rehabilitation Physical Therapy Progress Note     Date: 22  Patient Name: Earnest Hicks                Room: 8286/8108-15  MRN:  [id number]          Account: [de-identified]   : 1960  (62 y.o.) Gender: female      Referring Practitioner: Rajesh Davalos MD  Diagnosis: Nontraumatic cervical spinal cord injury  Past Medical History:  has a past medical history of Anxiety, Arthritis, At maximum risk for fall, Cancer ([address] Cauda equina syndrome ([address] Cervical stenosis of spine, GERD (gastroesophageal reflux disease), History of stomach ulcers, Hypertension, Hypothyroidism, Lumbar neuritis, Post laminectomy syndrome, Spinal deformity, Thyroid disease, Uses wheelchair, Wears dentures, and Wellness examination. Past Surgical History:   has a past surgical history that includes hernia repair (Bilateral); Breast surgery (Right); Mastectomy, radical; Hysterectomy, total abdominal; Lumbar spine surgery (N/A, 2021); back surgery; Colonoscopy (about ); other surgical history (2022); cervical fusion (N/A, 2022); and cervical fusion (N/A, 2022). Additional Pertinent Hx: Earnest Hicks is a 62 y.o. female with history of cauda equina syndrome s/p L2-S1 laminectomy (2021), HTN, hypothyroidism, GERD, breast cancer, and anxiety admitted to [facility] on 2022. She presented for planned surgical intervention for cervical stenosis. She underwent C5 corpectomy with C4-C6 arthrodesis, open reduction of cervical kyphotic deformity, and C2-C7 posterior fusion on 22 (Dr. Salud Zacarias). She has a cervical collar when out of bed. After her lumbar surgery in Dec 2021, patient had been improving initially with therapy, however overall has regressed. . Pt admitted to rehab unit on 4/15/22.     Overall Orientation Status: Within Functional Limits  Restrictions/Precautions  Restrictions/Precautions: Fall Risk;General Precautions  Required Braces or Orthoses?: Yes  Implants present? :  (Lumbar/Neck surgery)  Required Braces or Orthoses  Cervical: c-collar (aspen collar on while out of bed ok to remove while resting in bed and eating)  Position Activity Restriction  Other position/activity restrictions: ambulate pt; s/p C2-C7 fixation      Subjective: Pt is pleasant and agreeable to PT. Vital Signs  Patient Currently in Pain: Yes     Bed Mobility:   Bed Mobility  Supine to Sit: Minimal assistance  Sit to Supine: Minimal assistance  Scooting: Minimal assistance  Comments: PT mat, wedge, 2 pillows.      Transfers:  Sit to Stand: Moderate Assistance  Stand to sit: Moderate Assistance  Bed to Chair: Moderate assistance  Comments: Pt completed transfers with RW and // bars. Pt demos good hand placement with min vc's. Pt occasionally demos decrease eccentric control. Ambulation 1  Surface: level tile  Device: Rolling Walker  Other Apparatus: Wheelchair follow  Assistance: Min A x1  Quality of Gait: Ataxic movements noted with LLE. Decreased coordiantion noted at LE as well as trunk. Decrease heel strike miah. Unsteady steps. Pt fearfull of steps  Gait Deviations: Slow Sharon;Decreased step length;Decreased arm swing  Distance: 200' (Seated Breaks given every ~50' due to fatigue) (Increase time to complete)    Stairs/Curb  Stairs?: Yes  Stairs  # Steps : 5 (x2)  Stairs Height: 4\" (and 6\")  Rails: Bilateral  Device: No Device  Assistance: Minimal assistance  Comment: Completed in a step to pattern. Pt requires cues for LLE foot clearance, good carryover. Wheelchair Activities  Wheelchair Type: Recliner  Wheelchair Cushion: Standard  Pressure Relief Type: Lateral lean  Level of Assistance for pressure relief activities: Minimal assistance  Wheelchair Parts Management: No  Propulsion: Yes  Propulsion 1  Propulsion: Manual  Level: Level Tile  Method: RUE;LUE  Level of Assistance: Minimal assistance; Moderate assistance  Description/ Details: min A for straight path. mod A for wide turns. Distance: 60 ft, with 2 to 3 rest breaks. BALANCE Posture: Fair  Sitting - Static: Good;-  Sitting - Dynamic: Fair  Standing - Static: Fair  Standing - Dynamic: Poor;+  Comments: Standing with rolling walker.     EXERCISES    Other exercises 1: Nustep L2 x10 min  Other exercises 2: Seated bilat. LE 2x10 reps 2# on RLE and AROM on LLE  Other exercises 3:STS x5  Increase time required to complete all tasks with rest breaks between each. Other Activities  Comment: Breaks given PRN  Activity Tolerance: Patient limited by pain,Patient limited by fatigue,Patient limited by endurance  PT Equipment Recommendations  Other: Pt has rolling walker, rollator, manual wheelchair and lift chair at home           Current Treatment Recommendations: Strengthening,ROM,Balance Training,Endurance Training,Functional Mobility Training,Transfer Training,ADL/Self-care Training,Stair training,Gait [employer] Exercise Program,Safety Education & Training,Patient/Caregiver Education & Training,Equipment Evaluation, Education, & procurement,Neuromuscular Re-education     Conditions Requiring Skilled Therapeutic Intervention  Body structures, Functions, Activity limitations: Decreased functional mobility ; Decreased ADL status; Decreased strength;Decreased endurance;Decreased posture;Decreased balance;Decreased coordination; Increased pain  Assessment: Pt will continue to benefit from PT to progress activity and promote safety with gait and transfers  Treatment Diagnosis: B LE/B UE weakness, difficulty walking  Prognosis: Good  Decision Making: High Complexity  Barriers to Learning: none  REQUIRES PT FOLLOW UP: Yes  Discharge Recommendations: Home with assist PRN;Patient would benefit from continued therapy after discharge     Goals  Short term goals  Time Frame for Short term goals: 10 days  Short term goal 1: Pt will perform bed mobility/rolling with SBA  Short term goal 2: Pt will perform transfers with Miryam  Short term goal 3: Pt will ambulate 50 to 100 ft, with rolling walker, Miryam. Short term goal 4: Pt will perform wheelchair mobility for 150 ft, SB stright path, min A for corners. Short term goal 5: Pt able to perform 3 to 5 steps with 2 rails, min/mod A  Long term goals  Time Frame for Long term goals : By DC  Long term goal 1: Pt able to perform sit<>stand transfers, mod-I  Long term goal 2: Pt able to perform pivot transfers at SBA/supervsion. Long term goal 3: Pt able to ambulate with rolling walker 100 to 150 ft , level surfaces, with rolling walker, CGA. Long term goal 4: Pt able to perform curb step with UE support at [address] term goal 5: Pt able to perform 5 to 12 steps with 2 rails at min A to improve LE strength/coordination  Long term goal 6: Pt able to propel w/c  distance of 150 ft SBA level surfaces  Long term goal 7: Pt able to ambulate on outside terraine/incline surface at min A with rolling walker  Long term goal 8: Pt able to ambulate distance of 60 to 70 ft for 2MWT to improve fucntion and gait speed.   Long term goal 5: Family training for safe functional mobility for DC home       04/20/22 1005 04/20/22 1303   PT Individual Minutes   Time In 3021 1979   Time Out [id number]   Minutes 62 32     Electronically signed by Jessica Vargas PTA on 4/20/2022 at 5:15 PM

## 2022-04-20 NOTE — PLAN OF CARE
Problem: Skin Integrity:  Goal: Will show no infection signs and symptoms  Description: Will show no infection signs and symptoms  4/20/2022 1721 by Katia Vega LPN  Outcome: Progressing     Problem: Skin Integrity:  Goal: Absence of new skin breakdown  Description: Absence of new skin breakdown  4/20/2022 1721 by Katia Vega LPN  Outcome: Progressing     Problem: Falls - Risk of:  Goal: Will remain free from falls  Description: Will remain free from falls  4/20/2022 1721 by Katia Vega LPN  Outcome: Progressing     Problem: Falls - Risk of:  Goal: Absence of physical injury  Description: Absence of physical injury  4/20/2022 1721 by Katia Vgea LPN  Outcome: Progressing     Problem: Discharge Planning:  Goal: Discharged to appropriate level of care  Description: Discharged to appropriate level of care  4/20/2022 1721 by Katia Vega LPN  Outcome: Progressing     Problem: Pain:  Goal: Pain level will decrease  Description: Pain level will decrease  4/20/2022 1721 by Katia Vega LPN  Outcome: Progressing     Problem: Discharge Planning:  Goal: Discharged to appropriate level of care  Description: Discharged to appropriate level of care  4/20/2022 1721 by Katia Vega LPN  Outcome: Progressing     Problem: Falls - Risk of:  Goal: Will remain free from falls  Description: Will remain free from falls  4/20/2022 1721 by Katia Vega LPN  Outcome: Progressing     Problem: Falls - Risk of:  Goal: Absence of physical injury  Description: Absence of physical injury  4/20/2022 1721 by Katia Vega LPN  Outcome: Progressing     Problem: Discharge Planning:  Goal: Discharged to appropriate level of care  Description: Discharged to appropriate level of care  4/20/2022 1721 by Katia Vega LPN  Outcome: Progressing     Problem: Pain:  Goal: Pain level will decrease  Description: Pain level will decrease  4/20/2022 1721 by Katia Vega LPN  Outcome: Progressing     Problem: Pain:  Goal: Control of acute pain  Description: Control of acute pain  4/20/2022 1721 by Juanito Eubanks LPN  Outcome: Progressing     Problem: Pain:  Goal: Control of chronic pain  Description: Control of chronic pain  4/20/2022 1721 by Juanito Eubanks LPN  Outcome: Progressing     Problem: Pain:  Goal: Control of chronic pain  Description: Control of chronic pain  4/20/2022 1721 by Juanito Eubanks LPN  Outcome: Progressing     Problem: Musculor/Skeletal Functional Status  Goal: Highest potential functional level  4/20/2022 1721 by Juanito Eubanks LPN  Outcome: Progressing     Problem: Pain:  Goal: Control of acute pain  Description: Control of acute pain  4/20/2022 1721 by Juanito Eubanks LPN  Outcome: Progressing     Problem: Pain:  Goal: Control of acute pain  Description: Control of acute pain  4/20/2022 1721 by Juanito Eubanks LPN  Outcome: Progressing     Problem: Pain:  Goal: Control of chronic pain  Description: Control of chronic pain  4/20/2022 1721 by Juanito Eubanks LPN  Outcome: Progressing     Problem: Nutrition  Goal: Optimal nutrition therapy  4/20/2022 1721 by Juanito Eubanks LPN  Outcome: Progressing     Problem: Musculor/Skeletal Functional Status  Goal: Highest potential functional level  4/20/2022 1721 by Juanito Eubanks LPN  Outcome: Progressing     Problem: Musculor/Skeletal Functional Status  Goal: Absence of falls  4/20/2022 1721 by Juanito Eubanks LPN  Outcome: Progressing     Problem: Discharge Planning  Goal: Discharge to home or other facility with appropriate resources  Outcome: Progressing

## 2022-04-20 NOTE — PLAN OF CARE
Problem: Skin Integrity:  Goal: Will show no infection signs and symptoms  Description: Will show no infection signs and symptoms  Outcome: Ongoing     Problem: Skin Integrity:  Goal: Absence of new skin breakdown  Description: Absence of new skin breakdown  Outcome: Ongoing     Problem: Falls - Risk of:  Goal: Will remain free from falls  Description: Will remain free from falls  Outcome: Ongoing     Problem: Falls - Risk of:  Goal: Absence of physical injury  Description: Absence of physical injury  Outcome: Ongoing     Problem: Discharge Planning:  Goal: Discharged to appropriate level of care  Description: Discharged to appropriate level of care  Outcome: Ongoing     Problem: Pain:  Goal: Pain level will decrease  Description: Pain level will decrease  Outcome: Ongoing     Problem: Pain:  Goal: Control of acute pain  Description: Control of acute pain  Outcome: Ongoing     Problem: Pain:  Goal: Control of chronic pain  Description: Control of chronic pain  Outcome: Ongoing     Problem: Nutrition  Goal: Optimal nutrition therapy  Outcome: Ongoing     Problem: Musculor/Skeletal Functional Status  Goal: Highest potential functional level  Outcome: Ongoing     Problem: Musculor/Skeletal Functional Status  Goal: Absence of falls  Outcome: Ongoing

## 2022-04-20 NOTE — PROGRESS NOTES
Occupational Therapy  Facility/Department: [facility]  Rehabilitation Occupational Therapy Daily Treatment Note    Date: 22  Patient Name: Ian Novak       Room: 7897/4868-23  MRN: [id number]  Account: [de-identified]   : 1960  (62 y.o.) Gender: female     Referring Practitioner: Ulysses Bally, MD  Diagnosis: Nontraumatic cervical spinal cord injury       Treatment Diagnosis: Impaired self care status   Past Medical History:  has a past medical history of Anxiety, Arthritis, At maximum risk for fall, Cancer ([facility] [address] Cauda equina syndrome (HCC), Cervical stenosis of spine, GERD (gastroesophageal reflux disease), History of stomach ulcers, Hypertension, Hypothyroidism, Lumbar neuritis, Post laminectomy syndrome, Spinal deformity, Thyroid disease, Uses wheelchair, Wears dentures, and Wellness examination. Past Surgical History:   has a past surgical history that includes hernia repair (Bilateral); Breast surgery (Right); Mastectomy, radical; Hysterectomy, total abdominal; Lumbar spine surgery (N/A, 2021); back surgery; Colonoscopy (about ); other surgical history (2022); cervical fusion (N/A, 2022); and cervical fusion (N/A, 2022). Restrictions  Restrictions/Precautions: Fall Risk;General Precautions  Implants present? :  (Lumbar/Neck surgery)  Other position/activity restrictions: ambulate pt; s/p C2-C7 fixation  Required Braces or Orthoses  Cervical: c-collar (aspen collar on while out of bed ok to remove while resting )  Required Braces or Orthoses?: Yes    Subjective  Subjective: \"I just got my pain medications so it ill take a while. \"   Pain Level: 9  Pain Location: Neck  Pain Orientation: Posterior  Restrictions/Precautions: Fall Risk;General Precautions  Overall Orientation Status: Within Functional Limits     Pain Assessment  Pain Assessment: 0-10  Pain Level: 9  Pain Location: Neck  Pain Orientation: Posterior  Pain Descriptors: Burning  Pain Type: Acute pain,Surgical pain    Objective     Cognition  Overall Cognitive Status: WNL  Orientation  Overall Orientation Status: Within Functional Limits   Perception  Overall Perceptual Status: WFL     ADL  Grooming/Oral Hygiene  Assistance Level: Supervision (seated in w/c sinklevel )  Upper Extremity Bathing  Assistance Level: Stand by assist;Set-up (seated tub transfer bench)  Lower Extremity Bathing  Equipment Provided: Long-handled sponge  Assistance Level: Minimal assistance (MIN A for standing balance for juanita care)  Skilled Clinical Factors: MIN A for standing balance for juanita care, seated for washing BLEs with LHS  Upper Extremity Dressing  Assistance Level: Minimal assistance  Skilled Clinical Factors: A changing C-collar padding after shower and shirt over head  Lower Extremity Dressing  Equipment Provided: Reachers;Sock aid  Assistance Level: Minimal assistance  Skilled Clinical Factors: MIN A  for standing balance bringing pants/brief over hips, able to ronny/doff /pants/brief using reacher. Sock aid used to don footies  Putting On/Taking Off Footwear  Equipment Provided: Sock aid  Assistance Level: Stand by assist;Set-up; Verbal cues  Skilled Clinical Factors: Sock aid used to don footies  Toileting  Assistance Level: Minimal assistance  Skilled Clinical Factors: MIN A for standing during juanita care  Toilet Transfers  Technique: Stand pivot; To the left; To the right  Equipment: Grab bars;Standard toilet  Additional Factors: Verbal cues;Cues for hand placement; Increased time to complete; With handrails  Assistance Level: Minimal assistance  Skilled Clinical Factors: MIN A for standing and cuing for hand/foot placement pre/post transfer   Tub/Shower Transfers  Type: Shower  Transfer From: Wheelchair  Transfer To: Tub transfer bench  Additional Factors: Cues for hand placement;Verbal cues; With handrails  Assistance Level: Minimal assistance     Functional Mobility  Device: Wheelchair  Activity: To/From therapy gym;To/From bathroom  Assistance Level: Dependent  Skilled Clinical Factors: did not self propel   Supine to Sit  Assistance Level: Minimal assistance  Scooting  Assistance Level: Stand by assist  Sit to Stand  Assistance Level: Minimal assistance  Stand to Sit  Assistance Level: Minimal assistance  Bed To/From Chair  Technique: Stand pivot  Assistance Level: Minimal assistance  Skilled Clinical Factors: G use of foot placement prior to transfer  Stand Pivot  Assistance Level: Minimal assistance  Skilled Clinical Factors: VC for hand placement, no LOB noted        Additional Activities: PM: HUNG facilitated pt engagment in FM skills this date to support ability to participate in ADLs safely and independently. Pt instructed to gather small plastic pegs from table and place into small peg board. Increased difficulty with L hand observed. Pt intermittently used R hand to adjust peg in L hand prior to placing peg into trageted area. increased time req for completion. Assessment  Assessment  Activity Tolerance: Patient limited by pain; Patient limited by fatigue;Patient limited by endurance  Discharge Recommendations: Patient would benefit from continued therapy after discharge;Home with assist PRN  Safety Devices  Safety Devices in place: Yes  Type of devices: All fall risk precautions in place;Call light within reach;Gait belt;Patient at risk for falls;Nurse notified; Left in chair  Restraints  Initially in place: No    Patient Education  Education  Education Given To: Patient  Education Provided: Precautions; Safety;Transfer Training;Equipment;Plan of Care;Role of Therapy  Education Method: Verbal;Demonstration  Barriers to Learning: None  Education Outcome: Continued education needed    Plan  Plan  Times per Week: 5-7  Times per Day: Twice a day  Current Treatment Recommendations: Self-Care / ADL; Strengthening;Balance Training;Functional Mobility Training; Endurance Training;Pain Management; Safety Education & Training;Patient/Caregiver Education & Training;Equipment Evaluation, Education, & procurement;Home Management Training    Goals  Patient Goals   Patient goals : \"To get my body that way so that I can take care of things on my own. \"  Short Term Goals  Time Frame for Short term goals: By 1 week  Short Term Goal 1: Pt will complete lower body dressing/bathing with min A and Good safety with use of AE as needed  Short Term Goal 2: Pt will complete upper body dressing with CGA  and Good safety while maintaining cervical precautions  Short Term Goal 3: Pt will complete functional transfers during self care tasks with min A and Good safety  Short Term Goal 4: Pt will tolerate standing 4+ minutes during functional activity of choice with min A and Good safety  Short Term Goal 5: Pt will verbalize/demonstrate Good understanding of cervical precautions during self care tasks with increase safety and independence with daily activities  Short Term Goal 6: Pt will participate in 30+ mintues of therapeutic exercises/functional activities to increase safety and independence with self care and mobility  Long Term Goals  Time Frame for Long term goals : By discharge  Long Term Goal 1: Pt will complete BADLs with modified independence and Good safety while maintaining cervical precautions  Long Term Goal 2: Pt will complete functional transfers during self care tasks with modified independence with Good safety   Long Term Goal 3: Pt will tolerate standing 8+ mintues during functional activity of choice with Good safety  Long Term Goal 4: Pt will verbalize/demonstrate Good understanding of home safety/fall prevention strategies to increase safety and independence with self care and mobility  Long Term Goal 5: Pt will verbalize/demonstrate Good understanding of adaptive strategies/AE/DME to assisit with maintaining cervical precautions and increase safety and independence with self care and mobility  Long Term Goal 6: Pt will complete simple meal prep/light house keeping tasks with CGA and Good safety  Long Term Goal 7: OT to assess FM and  strength              04/20/22 0905 04/20/22 1421   OT Individual Minutes   Time In 0905 1421   Time Out 1006 1452   Minutes [address], SHERLYN

## 2022-04-20 NOTE — DISCHARGE SUMMARY
[] [facility] ([facility]) @ [facility]  [address]  [place], [address] [address] [facility]  Phone [phone]  Fax [phone]    Physical Therapy Discharge Note    Date: 2022      Patient: Osvaldo Anthony  : 1960  MRN: [id number]: Danielle Fontenot MD      Insurance: Medicare [facility] CAP  Medical Diagnosis:   G83.4 (ICD-10-CM) - Cauda equina syndrome ([facility] [address]  M48.062 (ICD-10-CM) - Lumbar stenosis with neurogenic claudication       Total visits attended:20  Cancels/No shows:2/0  Date of initial visit: 22                   [] Patient recovered from conditions. Treatment goals were met. [] Patient received maximum benefit. No further therapy indicated at this time. [] Patient demonstrated improvement from condition with  ** Of  ** Short term goals met. []Patient demonstrated improvement from condition with **   Of **  Long term goals met. [] Patient to continue exercise/home instructions independently. [x] Therapy interrupted due to:  decline in function patient going back to doctor. She had surgery 22 to her cervical spine. [] Patient has 2 or more no shows/cancels, is discontinued per our policy. [] Patient has completed prescribed number of treatment sessions.     [] Other:                        Treatment Included:     [x] Therapeutic Exercise   93918  [] Iontophoresis: 4 mg/mL Dexamethasone Sodium Phosphate  mAmin  27770   [x] Therapeutic Activity  52190 [] Vasopneumatic cold with compression  38498    [x] Gait Training   45508 [] Ultrasound   41614   [x] Neuromuscular Re-education  89693 [] Electrical Stimulation Unattended  90639   [] Manual Therapy  20299 [] Electrical Stimulation Attended  45648   [x] Instruction in HEP  [] Lumbar/Cervical Traction  73296   [] Aquatic Therapy   30099 [] Cold/hotpack    [] Massage   91506      [] Dry Needling, 1 or 2 muscles  80247   [] Biofeedback, first 15 minutes   68779  [] Biofeedback, additional 15 minutes   76181 [] Dry Needling, 3 or more muscles  02603                If you have any questions or concerns regarding this patient's care, please contact us.    Thank you for your referral.      Electronically signed by: Sly Rockwell PT

## 2022-04-21 PROCEDURE — 99232 SBSQ HOSP IP/OBS MODERATE 35: CPT | Performed by: PHYSICAL MEDICINE & REHABILITATION

## 2022-04-21 PROCEDURE — 97116 GAIT TRAINING THERAPY: CPT

## 2022-04-21 PROCEDURE — 6360000002 HC RX W HCPCS: Performed by: REGISTERED NURSE

## 2022-04-21 PROCEDURE — 97112 NEUROMUSCULAR REEDUCATION: CPT

## 2022-04-21 PROCEDURE — 99232 SBSQ HOSP IP/OBS MODERATE 35: CPT | Performed by: INTERNAL MEDICINE

## 2022-04-21 PROCEDURE — 6370000000 HC RX 637 (ALT 250 FOR IP): Performed by: INTERNAL MEDICINE

## 2022-04-21 PROCEDURE — 97110 THERAPEUTIC EXERCISES: CPT

## 2022-04-21 PROCEDURE — 6370000000 HC RX 637 (ALT 250 FOR IP): Performed by: REGISTERED NURSE

## 2022-04-21 PROCEDURE — 1180000000 HC REHAB R&B

## 2022-04-21 PROCEDURE — 97535 SELF CARE MNGMENT TRAINING: CPT

## 2022-04-21 PROCEDURE — 6370000000 HC RX 637 (ALT 250 FOR IP): Performed by: PHYSICAL MEDICINE & REHABILITATION

## 2022-04-21 PROCEDURE — 97530 THERAPEUTIC ACTIVITIES: CPT

## 2022-04-21 RX ADMIN — DILTIAZEM HYDROCHLORIDE 180 MG: 180 CAPSULE, COATED, EXTENDED RELEASE ORAL at 08:22

## 2022-04-21 RX ADMIN — ACETAMINOPHEN 650 MG: 325 TABLET ORAL at 16:48

## 2022-04-21 RX ADMIN — DOCUSATE SODIUM 200 MG: 100 CAPSULE, LIQUID FILLED ORAL at 08:23

## 2022-04-21 RX ADMIN — BACLOFEN 10 MG: 10 TABLET ORAL at 16:48

## 2022-04-21 RX ADMIN — BUSPIRONE HYDROCHLORIDE 30 MG: 15 TABLET ORAL at 21:05

## 2022-04-21 RX ADMIN — BUSPIRONE HYDROCHLORIDE 30 MG: 15 TABLET ORAL at 08:22

## 2022-04-21 RX ADMIN — BACLOFEN 10 MG: 10 TABLET ORAL at 14:49

## 2022-04-21 RX ADMIN — BACLOFEN 10 MG: 10 TABLET ORAL at 08:23

## 2022-04-21 RX ADMIN — FERROUS SULFATE TAB 325 MG (65 MG ELEMENTAL FE) 325 MG: 325 (65 FE) TAB at 08:23

## 2022-04-21 RX ADMIN — OXYCODONE HYDROCHLORIDE 10 MG: 10 TABLET ORAL at 21:48

## 2022-04-21 RX ADMIN — PANTOPRAZOLE SODIUM 40 MG: 40 TABLET, DELAYED RELEASE ORAL at 05:46

## 2022-04-21 RX ADMIN — LISINOPRIL 5 MG: 5 TABLET ORAL at 08:23

## 2022-04-21 RX ADMIN — BACLOFEN 10 MG: 10 TABLET ORAL at 21:04

## 2022-04-21 RX ADMIN — OXYCODONE HYDROCHLORIDE 10 MG: 10 TABLET ORAL at 15:00

## 2022-04-21 RX ADMIN — METHADONE HYDROCHLORIDE 10 MG: 10 TABLET ORAL at 08:23

## 2022-04-21 RX ADMIN — ACETAMINOPHEN 650 MG: 325 TABLET ORAL at 11:18

## 2022-04-21 RX ADMIN — ACETAMINOPHEN 650 MG: 325 TABLET ORAL at 23:56

## 2022-04-21 RX ADMIN — METHADONE HYDROCHLORIDE 10 MG: 10 TABLET ORAL at 21:04

## 2022-04-21 RX ADMIN — DOCUSATE SODIUM 200 MG: 100 CAPSULE, LIQUID FILLED ORAL at 21:04

## 2022-04-21 RX ADMIN — FERROUS SULFATE TAB 325 MG (65 MG ELEMENTAL FE) 325 MG: 325 (65 FE) TAB at 16:48

## 2022-04-21 RX ADMIN — OXYCODONE HYDROCHLORIDE 10 MG: 10 TABLET ORAL at 05:45

## 2022-04-21 RX ADMIN — LEVOTHYROXINE SODIUM 88 MCG: 0.09 TABLET ORAL at 05:45

## 2022-04-21 RX ADMIN — ACETAMINOPHEN 650 MG: 325 TABLET ORAL at 05:45

## 2022-04-21 RX ADMIN — ENOXAPARIN SODIUM 40 MG: 100 INJECTION SUBCUTANEOUS at 08:23

## 2022-04-21 ASSESSMENT — PAIN SCALES - GENERAL
PAINLEVEL_OUTOF10: 7
PAINLEVEL_OUTOF10: 8
PAINLEVEL_OUTOF10: 9
PAINLEVEL_OUTOF10: 7
PAINLEVEL_OUTOF10: 7
PAINLEVEL_OUTOF10: 8
PAINLEVEL_OUTOF10: 7

## 2022-04-21 ASSESSMENT — PAIN DESCRIPTION - DESCRIPTORS: DESCRIPTORS: ACHING;GNAWING

## 2022-04-21 ASSESSMENT — PAIN DESCRIPTION - LOCATION
LOCATION: NECK
LOCATION: NECK

## 2022-04-21 ASSESSMENT — PAIN DESCRIPTION - ORIENTATION
ORIENTATION: MID
ORIENTATION: MID

## 2022-04-21 NOTE — PLAN OF CARE
Problem: Skin Integrity:  Goal: Will show no infection signs and symptoms  Description: Will show no infection signs and symptoms  4/21/2022 1346 by Andrew Penta  Outcome: Progressing   Skin assessment done this shift. Nutrition and fluid intake assessed. Morteza score completed. Bilateral heels elevated while in bed. Pt able to reposition self. Skin integrity intact. No new breakdown found. Problem: Skin Integrity:  Goal: Absence of new skin breakdown  Description: Absence of new skin breakdown  4/21/2022 1346 by Andrew Penta  Outcome: Progressing   Skin assessment done this shift. Nutrition and fluid intake assessed. Morteza score completed. Bilateral heels elevated while in bed. Pt able to reposition self. Skin integrity intact. No new breakdown found. Problem: Falls - Risk of:  Goal: Will remain free from falls  Description: Will remain free from falls  4/21/2022 1346 by Andrew Daniela  Outcome: Progressing   Patient has sustained no falls or injuries at this time. Patient has not tried to get out of bed without nursing assistance. Call light is in reach, side rails are up, bed is at lowest position and locked, proper footwear is in place. Hourly nursing rounds made. Problem: Falls - Risk of:  Goal: Absence of physical injury  Description: Absence of physical injury  4/21/2022 1346 by Andrew Penta  Outcome: Progressing   Patient has sustained no falls or injuries at this time. Patient has not tried to get out of bed without nursing assistance. Call light is in reach, side rails are up, bed is at lowest position and locked, proper footwear is in place. Hourly nursing rounds made.     Problem: Discharge Planning:  Goal: Discharged to appropriate level of care  Description: Discharged to appropriate level of care  4/21/2022 1346 by Andrew Verdin  Outcome: Progressing     Problem: Pain:  Goal: Pain level will decrease  Description: Pain level will decrease  4/21/2022 1346 by Andrew Verdin  Outcome: Progressing     Problem: Pain:  Goal: Control of acute pain  Description: Control of acute pain  4/21/2022 1346 by Eloy Duarte  Outcome: Progressing   Patient states current medications for pain are effective for back pain. Will continue to monitor and provide non pharmacological interventions     Problem: Pain:  Goal: Control of chronic pain  Description: Control of chronic pain  4/21/2022 1346 by Eloy Duarte  Outcome: Progressing    Patient states current medications for pain are effective for back pain.  Will continue to monitor and provide non pharmacological interventions     Problem: Nutrition  Goal: Optimal nutrition therapy  4/21/2022 1346 by Eloy Duarte  Outcome: Progressing     Problem: Musculor/Skeletal Functional Status  Goal: Highest potential functional level  4/21/2022 1346 by Eloy Duarte  Outcome: Progressing     Problem: Musculor/Skeletal Functional Status  Goal: Absence of falls  4/21/2022 1346 by Eloy Duarte  Outcome: Lety Richter

## 2022-04-21 NOTE — PROGRESS NOTES
[address] [facility] Internal Medicine  Annamaria Max MD; Shankar Barker MD; Pebbles Mosquera MD; MD Edilberto Sullivan MD; MD KEENAN Reeder JA[facility]ella [facility] Internal Medicine   [address] / HISTORY AND PHYSICAL EXAMINATION            Date:   4/21/2022  Patient name:  Nan Santos  Date of admission:  4/15/2022  5:32 PM  MRN:   [id number]  Account:  [de-identified]  YOB: 1960  PCP:    Bernice Campo MD  Room:   0357/8054-26  Code Status:    Full Code    Physician Requesting Consult: Jey Aden MD    Reason for Consult: Medical management    Chief Complaint:     No chief complaint on file. Underwent anterior C5 corpectomy, posterior fixation from C2-T1 with arthrodesis, osteotomy, correction of deformity on April 12    History Obtained From:     patient    History of Present Illness: The patient is a 61 y.o. female presented for elective surgery. Underwent anterior C5 corpectomy, posterior fixation from C2-T1 with arthrodesis, osteotomy, correction of deformity on 4/12 with Dr. Prabhu Oh 4/12. Symptoms include significant left-sided paresis, severe lower extremity weakness (wheelchair bound, now unable to stand or ambulate [id number]), progressively worsening paresthesias and dexterity issues with left upper extremity and left lower extremity.  Additionally complains of some moderate saddle anesthesia, urge incontinence but no bowel dysfunction.   Admitted to acute rehab for further management for deconditioning,  4/21  No new complaints  Pain is controlled on methadone  Working with therapy      Past Medical History:     Past Medical History:   Diagnosis Date    Anxiety     Arthritis     At maximum risk for fall 12/29/2021    caudal equina syndrome and cervical stenosis    Cancer ([address]     right breast    Cauda equina syndrome ([address] 12/29/2021    neuropathy, mobility difficulty, incontinance    Cervical stenosis of spine 12/29/2021    GERD (gastroesophageal reflux disease)     History of stomach ulcers     Hypertension     Dr. Mitchell Salcedo    Hypothyroidism     Lumbar neuritis     Post laminectomy syndrome     Spinal deformity 11/2021    cervical and lumbar    Thyroid disease     Uses wheelchair     Wears dentures     Wellness examination     Dr. Mitchell Salcedo        Past Surgical History:     Past Surgical History:   Procedure Laterality Date    BACK SURGERY      lower back x 3, cervical- x 1: C4- C6, 11/4/2014     BREAST SURGERY Right     mastectomy with reconstruction    CERVICAL FUSION N/A 4/12/2022    C5 CORPECTOMY, USE OF INTRAOPERATIVE CERVICAL TRACTION performed by Carlos Chisholm DO at [address] [place] [address] N/A 4/12/2022    POSTERIOR FIXATION C2-C7 performed by Carlos Chisholm DO at [address] [facility]  about 2018    HERNIA REPAIR Bilateral     inguinal    HYSTERECTOMY, TOTAL ABDOMINAL      LUMBAR SPINE SURGERY N/A 12/30/2021    LUMBAR LAMINECTOMY L2-S1 performed by Carlos Chisholm DO at [address] [facility]  04/12/2022    C5 CORPECTOMY, USE OF INTRAOPERATIVE CERVICAL TRACTION (N/A Spine Cervical)         Medications Prior to Admission:     Prior to Admission medications    Medication Sig Start Date End Date Taking?  Authorizing Provider   dilTIAZem (DILACOR XR) 180 MG extended release capsule TAKE ONE CAPSULE BY MOUTH DAILY 4/1/22   Rona Peterson MD   ferrous sulfate (IRON 325) 325 (65 Fe) MG tablet Take 1 tablet by mouth 2 times daily (with meals) 1/13/22   Uriel Ramon MD   docusate sodium (COLACE) 100 MG capsule Take 2 capsules by mouth 2 times daily 1/13/22   Uriel Ramon MD   pantoprazole (PROTONIX) 40 MG tablet TAKE ONE TABLET BY MOUTH DAILY 12/20/21   Marilin Capellan MD   busPIRone (BUSPAR) 30 MG tablet Take 30 mg by mouth 2 times daily 12/17/21   Marilin Capellan MD   levothyroxine (SYNTHROID) 88 MCG tablet Take 1 tablet by mouth Daily 12/17/21 3/18/22  Lord Guardian, MD   tiZANidine (ZANAFLEX) 4 MG tablet Take 4 mg by mouth every 12 hours    Historical Provider, MD   Vitamin D, Ergocalciferol, 50 MCG (2000 UT) CAPS TAKE ONE CAPSULE BY MOUTH DAILY 3/29/21   Carlos Mccain PA-C   HYDROcodone-acetaminophen [facility]) 7.5-325 MG per tablet Up to three tablets a day. 10/21/15   Historical Provider, MD   methadone (DOLOPHINE) 10 MG tablet Take 10 mg by mouth 2 times daily. Historical Provider, MD        Allergies:     Latex and Kiwi extract    Social History:     Tobacco:    reports that she quit smoking about 8 years ago. She has a 15.00 pack-year smoking history. She has never used smokeless tobacco.  Alcohol:      reports current alcohol use. Drug Use:  reports no history of drug use. Family History:     Family History   Problem Relation Age of Onset    Hypertension Mother     Heart Disease Mother     Cancer Mother        Review of Systems:     Positive and Negative as described in HPI. CONSTITUTIONAL:  negative for fevers, chills, sweats, fatigue, weight loss  HEENT:  negative for vision, hearing changes, runny nose, throat pain  RESPIRATORY:  negative for shortness of breath, cough, congestion, wheezing. CARDIOVASCULAR:  negative for chest pain, palpitations.   GASTROINTESTINAL:  negative for nausea, vomiting, diarrhea, constipation, change in bowel habits, abdominal pain   GENITOURINARY:  negative for difficulty of urination, burning with urination, frequency   INTEGUMENT:  negative for rash, skin lesions, easy bruising   HEMATOLOGIC/LYMPHATIC:  negative for swelling/edema   ALLERGIC/IMMUNOLOGIC:  negative for urticaria , itching  ENDOCRINE:  negative increase in drinking, increase in urination, hot or cold intolerance  MUSCULOSKELETAL:  negative joint pains, muscle aches, swelling of joints  NEUROLOGICAL:  negative for headaches, dizziness, lightheadedness, numbness, pain, tingling extremities  BEHAVIOR/PSYCH: negative for depression, anxiety    Physical Exam:     BP (!) 155/93   Pulse 91   Temp 97.7 °F (36.5 °C)   Resp 16   Ht 5' 1\" (1.549 m)   SpO2 96%   BMI 26.83 kg/m²   Temp (24hrs), Av.2 °F (36.8 °C), Min:97.7 °F (36.5 °C), Max:98.6 °F (37 °C)    No results for input(s): POCGLU in the last 72 hours. Intake/Output Summary (Last 24 hours) at 2022 1528  Last data filed at 2022 0550  Gross per 24 hour   Intake 360 ml   Output 1200 ml   Net -840 ml       General Appearance:  alert, well appearing, and in no acute distress  Mental status: oriented to person, place, and time with normal affect  Head:  normocephalic, atraumatic. Eye: no icterus, redness, pupils equal and reactive, extraocular eye movements intact, conjunctiva clear  Ear: normal external ear, no discharge, hearing intact  Nose:  no drainage noted  Mouth: mucous membranes moist  Neck: supple, no carotid bruits, thyroid not palpable  Lungs: Bilateral equal air entry, clear to ausculation, no wheezing, rales or rhonchi, normal effort  Cardiovascular: normal rate, regular rhythm, no murmur, gallop, rub. Abdomen: Soft, nontender, nondistended, normal bowel sounds, no hepatomegaly or splenomegaly  Neurologic: There are no new focal motor or sensory deficits, normal muscle tone and bulk, no abnormal sensation, normal speech, cranial nerves II through XII grossly intact  Skin: No gross lesions, rashes, bruising or bleeding on exposed skin area  Extremities:  peripheral pulses palpable, no pedal edema or calf pain with palpation  Psych: normal affect    Investigations:      Laboratory Testing:  No results found for this or any previous visit (from the past 24 hour(s)). Imaging/Diagonstics:  XR CERVICAL SPINE (2-3 VIEWS)    Result Date: 2022  Expected postoperative findings status post anterior and posterior cervical fusion. XR CHEST PORTABLE    Result Date: 2022  No acute cardiopulmonary disease.      XR CERVICAL SPINE FLEXION AND EXTENSION    Result Date: 4/13/2022  1. Retrolisthesis of C2 on C3 measures 2 mm on extension view and reduces on flexion view. 2. No evidence of instability of anterolisthesis of C3 on C4 measuring 1.5 mm. 3. No acute fracture on limited views. Of note, the C6-C7 and C7-T1 levels are not well seen due to overlying soft tissues. 4. Severe multilevel degenerative changes. CTA NECK W CONTRAST    Result Date: 4/12/2022  Unremarkable CTA of the neck. Minor hazy pleural thickening and chronic-appearing medial right upper lobe atelectasis/scarring. If there is any clinical concern, follow-up noncontrast chest CT may be useful. RECOMMENDATIONS: Unavailable       Assessment :      Hospital Problems           Last Modified POA    * (Principal) Spinal cord injury, cervical region, sequela ([facility] [address] 4/15/2022 Yes    Essential hypertension 4/16/2022 Yes    Hypothyroidism 4/16/2022 Yes    Post-menopausal osteoporosis 4/16/2022 Yes    Cervical myelopathy ([facility] [address] 4/16/2022 Yes    Anemia, normocytic normochromic 4/16/2022 Yes          Plan:     1. Spinal cord injury status post anterior C5 corpectomy, posterior fixation from C2-T1 with arthrodesis, osteotomy,  2. Essential hypertension,  Controlled, continue monitor blood pressure, continue home medications, added lisinopril, diltiazem,Controlled   3. Hyponatremia, better now ,  4. Constipation , prn meds   5. Hypothyroidism, continue levothyroxine,  6. Continue physical therapy,  7. DVT prophylaxis    Consultations:   IP CONSULT TO DIETITIAN  IP CONSULT TO SOCIAL WORK  IP CONSULT TO INTERNAL MEDICINE      Radha Gutierrez MD  4/21/2022  3:28 PM    Copy sent to Dr. Nico Blake MD    Please note that this chart was generated using voice recognition Dragon dictation software. Although every effort was made to ensure the accuracy of this automated transcription, some errors in transcription may have occurred.

## 2022-04-21 NOTE — PLAN OF CARE
Problem: Skin Integrity:  Goal: Will show no infection signs and symptoms  Description: Will show no infection signs and symptoms  4/21/2022 0226 by Anahi Glaser RN  Outcome: Progressing  4/20/2022 1721 by Faby Bennett LPN  Outcome: Progressing  Goal: Absence of new skin breakdown  Description: Absence of new skin breakdown  4/21/2022 0226 by Anahi Glaser RN  Outcome: Progressing  4/20/2022 1721 by Faby Bennett LPN  Outcome: Progressing     Problem: Falls - Risk of:  Goal: Will remain free from falls  Description: Will remain free from falls  4/21/2022 0226 by Anahi Glaser RN  Outcome: Progressing  4/20/2022 1721 by Faby Bennett LPN  Outcome: Progressing  Goal: Absence of physical injury  Description: Absence of physical injury  4/21/2022 0226 by Anahi Glaser RN  Outcome: Progressing  4/20/2022 1721 by Faby Bennett LPN  Outcome: Progressing     Problem: Discharge Planning:  Goal: Discharged to appropriate level of care  Description: Discharged to appropriate level of care  4/21/2022 0226 by Anahi Glaser RN  Outcome: Progressing  4/20/2022 1721 by Faby Bennett LPN  Outcome: Progressing     Problem: Pain:  Goal: Pain level will decrease  Description: Pain level will decrease  4/21/2022 0226 by Anahi Glaser RN  Outcome: Progressing  4/20/2022 1721 by Faby Bennett LPN  Outcome: Progressing  Goal: Control of acute pain  Description: Control of acute pain  4/21/2022 0226 by Anahi Glaser RN  Outcome: Progressing  4/20/2022 1721 by Faby Bennett LPN  Outcome: Progressing  Goal: Control of chronic pain  Description: Control of chronic pain  4/21/2022 0226 by Anahi Glaser RN  Outcome: Progressing  4/20/2022 1721 by Faby Bennett LPN  Outcome: Progressing     Problem: Nutrition  Goal: Optimal nutrition therapy  4/21/2022 0226 by Anahi Glaser RN  Outcome: Progressing  4/20/2022 1721 by Faby Bennett LPN  Outcome: Progressing     Problem: Musculor/Skeletal Functional Status  Goal: Highest potential functional level  4/21/2022 0226 by Abi Serrano RN  Outcome: Progressing  4/20/2022 1721 by Sammie Nses LPN  Outcome: Progressing  Goal: Absence of falls  4/21/2022 0226 by Abi Serarno RN  Outcome: Progressing  4/20/2022 1721 by Sammie Ness LPN  Outcome: Progressing     Problem: Discharge Planning  Goal: Discharge to home or other facility with appropriate resources  4/21/2022 0226 by Abi Serrano RN  Outcome: Progressing  4/20/2022 1721 by Sammie Ness LPN  Outcome: Progressing

## 2022-04-21 NOTE — PROGRESS NOTES
Physical Medicine & Rehabilitation  Progress Note      Subjective:      62year-old female with nontraumatic cervical spinal cord injury secondary to servical stenosis. Patient is doing well today with some continued c/o constipation. No new issues with pain, sleep, appetite, or bladder. ROS:  Denies fevers, chills, sweats. No chest pain, palpitations, lightheadedness. Denies coughing, wheezing or shortness of breath. Denies abdominal pain, nausea, diarrhea   No new areas of joint pain. Denies new areas of numbness or weakness. Denies new anxiety or depression issues. No new skin problems. Rehabilitation:   Progressing in therapies. PT:  Restrictions/Precautions: Fall Risk,General Precautions  Implants present? :  (Lumbar/Neck surgery)  Other position/activity restrictions: ambulate pt; s/p C2-C7 fixation  Required Braces or Orthoses  Cervical: c-collar (aspen collar on while out of bed ok to remove while resting )   Transfers  Sit to Stand: Moderate Assistance  Stand to sit: Moderate Assistance  Bed to Chair: Moderate assistance  Comment: Cues for errect posture, decreased coordination noted B LE L LE> R LE. Ambulation  Surface: level tile  Device: Rolling Walker  Other Apparatus: Wheelchair follow  Assistance: Minimal assistance  Quality of Gait: Pt demos difficulties with coordination. Pt demos an ataxic   Gait Deviations: Slow Sharon,Decreased step length,Decreased arm swing  Distance: 201' total. PT requires a seated rest break every ~50' (Breaks given PRN.)  More Ambulation?: No    Transfers  Sit to Stand: Moderate Assistance  Stand to sit: Moderate Assistance  Bed to Chair: Moderate assistance  Comment: Cues for errect posture, decreased coordination noted B LE L LE> R LE. Ambulation  Surface: level tile  Device: Rolling Walker  Other Apparatus: Wheelchair follow  Assistance: Minimal assistance  Quality of Gait: Pt demos difficulties with coordination.  Pt demos an ataxic   Gait Deviations: Slow Sharon,Decreased step length,Decreased arm swing  Distance: 201' total. PT requires a seated rest break every ~50' (Breaks given PRN.)  More Ambulation?: No    Surface: level tile  Ambulation  Surface: level tile  Device: Rolling Walker  Other Apparatus: Wheelchair follow  Assistance: Minimal assistance  Quality of Gait: Pt demos difficulties with coordination. Pt demos an ataxic   Gait Deviations: Slow Sharon,Decreased step length,Decreased arm swing  Distance: 201' total. PT requires a seated rest break every ~50' (Breaks given PRN.)  More Ambulation?: No    OT:  ADL  Equipment Provided: Sock aid,Long-handled sponge,Reacher  Feeding: Setup  Grooming: Minimal assistance  UE Bathing: Stand by assistance  LE Bathing: Minimal assistance  UE Dressing: Verbal cueing,Minimal assistance (standing during juanita care )  LE Dressing: Minimal assistance  Toileting: Minimal assistance  Additional Comments: Pt completed full shower this date. Seated on tub bench, utilizing hand held shower head and long handled sponge. Pt nust leav on c-collar during shower, changing to spare after shower is complete. Balance  Sitting Balance: Stand by assistance  Standing Balance: Minimal assistance   Standing Balance  Time: 1-2 min x3   Activity: transfers ,ADL activites   Comment: 1-2 UE support  Functional Mobility  Functional - Mobility Device: Wheelchair  Activity: To/from bathroom  Assist Level: Dependent/Total  Functional Mobility Comments: did not self propel      Bed mobility  Bridging:  (not indicated at this time)  Supine to Sit: Contact guard assistance  Sit to Supine: Minimal assistance  Scooting: Minimal assistance  Comment: PT mat, wedge, 3 pillows.    Transfers  Stand Step Transfers: Minimal assistance  Stand Pivot Transfers: Minimal assistance  Sit to stand: Minimal assistance  Stand to sit: Minimal assistance  Transfer Comments: Pt min A for transfers this date with verbal cues required for hand/foot placement during transfers for safety. Toilet Transfers  Toilet - Technique: Stand pivot,To left,To right  Equipment Used: Grab bars  Toilet Transfer: Minimal assistance  Toilet Transfers Comments: Verbal cues for hand placement and safety. L side weakness with LLE buckling. Shower Transfers  Shower - Transfer From: Wheelchair  Shower - Transfer Type: To and From  Shower - Transfer To: Transfer tub bench  Shower - Technique: Stand pivot,To right,To left  Shower Transfers: Contact Guard  Shower Transfers Comments: cues for safety   Wheelchair Bed Transfers  Wheelchair/Bed - Technique: Stand pivot  Equipment Used: Bed,Wheelchair  Level of Asssistance: Minimal assistance  Wheelchair Transfers Comments: Verbal cues for hand placement and safety. L side weakness with LLE buckling. SPEECH:      Objective:  BP (!) 155/93   Pulse 91   Temp 97.7 °F (36.5 °C)   Resp 16   Ht 5' 1\" (1.549 m)   SpO2 96%   BMI 26.83 kg/m²       GEN: Well developed, well nourished, in NAD  HEENT:  NCAT. PERRL. EOMI. Mucous membranes pink and moist. Hard cervical collar in place  PULM:  Clear to ausculation. No rales or rhonchi. Respirations WNL and unlabored. CV:  Regular rate rhythm. No murmurs or gallops. GI:  Abdomen soft. Tender, distended. BS + and equal.    NEUROLOGICAL: A&O x3. Sensation intact to light touch. .   MSK:  Functional ROM BUE and BLEs. Motor testing 4/5 key muscles BUEs, 3/5 B hip flexion, 3/5 R knee extension, 3-/5 L knee extension, 3-/5 B dorsiflexion . SKIN: Warm dry and intact. Good turgor. EXTREMITIES:  No calf tenderness to palpation. No edema BLEs. PSYCH: Mood WNL. Appropriately interactive. Affect WNL. Diagnostics:     CBC: No results for input(s): WBC, RBC, HGB, HCT, MCV, RDW, PLT in the last 72 hours. BMP:   No results for input(s): NA, K, CL, CO2, PHOS, BUN, CREATININE, CA, GLUCOSE in the last 72 hours. BNP: No results for input(s): BNP in the last 72 hours.   PT/INR: No results for input(s): PROTIME, INR in the last 72 hours. APTT: No results for input(s): APTT in the last 72 hours. CARDIAC ENZYMES: No results for input(s): CKMB, CKMBINDEX, TROPONINT in the last 72 hours. Invalid input(s): CKTOTAL;3 troponins   FASTING LIPID PANEL:  Lab Results   Component Value Date    CHOL 198 07/23/2020     (A) 07/23/2020    TRIG 96 07/23/2020     LIVER PROFILE: No results for input(s): AST, ALT, ALB, BILIDIR, BILITOT, ALKPHOS in the last 72 hours.      Current Medications:   Current Facility-Administered Medications: vitamin D3 (CHOLECALCIFEROL) tablet 5,000 Units, 5,000 Units, Oral, Weekly  baclofen (LIORESAL) tablet 10 mg, 10 mg, Oral, 4x Daily  lisinopril (PRINIVIL;ZESTRIL) tablet 5 mg, 5 mg, Oral, Daily  bisacodyl (DULCOLAX) suppository 10 mg, 10 mg, Rectal, Daily PRN  polyethylene glycol (GLYCOLAX) packet 17 g, 17 g, Oral, Daily  senna (SENOKOT) tablet 17.2 mg, 2 tablet, Oral, Daily PRN  acetaminophen (TYLENOL) tablet 650 mg, 650 mg, Oral, Q6H  magnesium hydroxide (MILK OF MAGNESIA) 400 MG/5ML suspension 30 mL, 30 mL, Oral, Daily PRN  ondansetron (ZOFRAN-ODT) disintegrating tablet 4 mg, 4 mg, Oral, Q8H PRN **OR** [DISCONTINUED] ondansetron (ZOFRAN) injection 4 mg, 4 mg, IntraVENous, Q6H PRN  oxyCODONE (ROXICODONE) immediate release tablet 5 mg, 5 mg, Oral, Q4H PRN **OR** oxyCODONE HCl (OXY-IR) immediate release tablet 10 mg, 10 mg, Oral, Q4H PRN  busPIRone (BUSPAR) tablet 30 mg, 30 mg, Oral, BID  dilTIAZem (CARDIZEM CD) extended release capsule 180 mg, 180 mg, Oral, Daily  docusate sodium (COLACE) capsule 200 mg, 200 mg, Oral, BID  enoxaparin (LOVENOX) injection 40 mg, 40 mg, SubCUTAneous, Daily  ferrous sulfate (IRON 325) tablet 325 mg, 325 mg, Oral, BID WC  levothyroxine (SYNTHROID) tablet 88 mcg, 88 mcg, Oral, Daily  methadone (DOLOPHINE) tablet 10 mg, 10 mg, Oral, BID  neomycin-bacitracin-polymyxin (NEOSPORIN) ointment, , Topical, BID  pantoprazole (PROTONIX) tablet 40 mg, 40 mg, Oral, Daily      Impression/Plan:   Impaired ADLs, gait, and mobility due to:      1. Nontraumatic cervical spinal cord injury secondary to cervical stenosis: s/p C5 corpectomy, C4-6 arthrodesis, open reduction cervical kyphosis, C2-7 posterior fusion performed by Dr. Chris Hernandez on 4/12/2022. PT/OT for gait, mobility, strengthening, endurance, ADLs, and self care. Has Tylenol prn, methadone BID, robaxin prn, oxycodone prn for pain. Improved after discontinuing Robaxin and changed to routine baclofen for muscle spasms/pain. Dose increased 4/18. 2. Hx cauda equina syndrome: s/p L2-S1 laminectomy (December 2021)  3. HTN: on Diltiazem. 4. Anemia: Hb low but improving. On ferrous sulfate. Monitoring. 5. Hypothyroidism: on levothyroxine. 6. Hyponatremia: stable. Monitoring. Medical management by IM  7. GERD: on pantoprazole. 8. Anxiety:on buspar. 9. Vitamin D deficiency: on repletion weekly  10. Hx breast cancer  11. Bowel Management: Miralax daily, senokot prn, dulcolax prn. Has milk of magnesia prn with no significant result 4/18. She will try dulcolax today  12. DVT Prophylaxis:  low molecular weight heparin, SCD's while in bed and HERMINIO's   13. Internal medicine for medical management      Electronically signed by Tina Redman MD on 4/21/2022 at 11:34 AM      This note is created with the assistance of a speech recognition program.  While intending to generate a document that actually reflects the content of the visit, the document can still have some errors including those of syntax and sound a like substitutions which may escape proof reading. In such instances, actual meaning can be extrapolated by contextual diversion.

## 2022-04-21 NOTE — PROGRESS NOTES
Physical Therapy  Facility/Department: [facility] ACUTE REHAB  Rehabilitation Physical Therapy Treatment Note    NAME: Ian Novak  : 1960 (62 y.o.)  MRN: [id number]  CODE STATUS: Full Code    Date of Service: 22       Restrictions:  Restrictions/Precautions: Fall Risk;General Precautions  Required Braces or Orthoses  Cervical: c-collar (on when OOB)     SUBJECTIVE  Subjective  Subjective: Pleasant and always eager to work in therapy. Pain Assessment  Pain Assessment: 0-10  Pain Level: 7  Pain Location: Neck        Post Treatment Pain Screening  Pain Assessment  Pain Assessment: 0-10  Pain Level: 7  Pain Location: Neck      OBJECTIVE  Cognition  Overall Cognitive Status: WNL  Orientation  Overall Orientation Status: Within Functional Limits    Functional Mobility  Bed Mobility  Overall Assistance Level: Minimal Assistance  Bridging  Assistance Level: Contact guard assist  Roll Left  Assistance Level: Contact guard assist  Roll Right  Assistance Level: Contact guard assist  Supine to Sit  Assistance Level: Minimal assistance  Scooting  Assistance Level: Contact guard assist  Balance  Sitting Balance: Stand by assistance  Standing Balance: Minimal assistance  Transfers  Surface: Wheelchair;From mat;From bed; To mat; To chair with arms; To bed  Additional Factors: Hand placement cues; Verbal cues  Device: Walker  Sit to Stand  Assistance Level: Minimal assistance  Skilled Clinical Factors: Requires cues to visually look at feet due to lack of proprioception. Stand to Sit  Assistance Level: Contact guard assist  Skilled Clinical Factors: Cues to reach back. Bed To/From Chair  Technique: Sit pivot;Stand pivot  Assistance Level: Minimal assistance  Stand Pivot  Assistance Level: Minimal assistance  Sit Pivot  Assistance Level: Minimal assistance      Environmental Mobility  Ambulation  Surface: Level surface  Device: Rolling walker  Distance: 200' total with seated rest breaks every ~50'.    Activity: Within Unit  Additional Factors: Increased time to complete  Assistance Level: Minimal assistance  Gait Deviations: Decreased step length left;Decreased weight shift left;Decreased heel strike left;Decreased heel strike right; Unsteady gait (ataxic)  Skilled Clinical Factors: PT demos increase genu valgum with LLE. Pt is limited by fatgiue and endurance. W/C follow for increase safety. Stairs  Stair Height: 4'';6''  Device: Bilateral handrails  Number of Stairs: 5  Additional Factors: Non-reciprocal going up;Non-reciprocal going down  Assistance Level: Minimal assistance  Skilled Clinical Factors - Comments: Educated on step to pattern. Neuromuscular Education  Neuromuscular Education: Yes  NDT Treatment: Standing;Sitting;Gait   Vibration: completed to quads and ant. tib. with good response. PT Exercises  A/AROM Exercises: Seated bilat. LE exercises  x 20 reps  Resistive Exercises: Seated orange band  x 20 reps  Circulation/Endurance Exercises: UBE  5 mins. FWD and BWD  Exercise Equipment: NuStep   x 15 mins. Workload 4      ASSESSMENT/PROGRESS TOWARDS GOALS       Assessment  Activity Tolerance: Patient limited by endurance (pt was up in w/c with c collar on from 8:30 am to 3 pm, then requesting pain meds and returned to supine and removed collar)  Discharge Recommendations: Home with assist PRN;Patient would benefit from continued therapy after discharge    Goals  Patient Goals   Patient goals : Be able to walk and do things for myself  Short Term Goals  Time Frame for Short term goals: 10 days  Short term goal 1: Pt will perform bed mobility/rolling with SBA  Short term goal 2: Pt will perform transfers with Miryam  Short term goal 3: Pt will ambulate 50 to 100 ft, with rolling walker, Miryam. Short term goal 4: Pt will perform wheelchair mobility for 150 ft, SB stright path, min A for corners.   Short term goal 5: Pt able to perform 3 to 5 steps with 2 rails, min/mod A  Long Term Goals  Time Frame for Long term goals : By DC  Long term goal 1: Pt able to perform sit<>stand transfers, mod-I  Long term goal 2: Pt able to perform pivot transfers at SBA/supervsion. Long term goal 3: Pt able to ambulate with rolling walker 100 to 150 ft , level surfaces, with rolling walker, CGA. Long term goal 4: Pt able to perform curb step with UE support at [address] term goal 5: Pt able to perform 5 to 12 steps with 2 rails at min A to improve LE strength/coordination  Long term goal 6: Pt able to propel w/c  distance of 150 ft SBA level surfaces  Long term goal 7: Pt able to ambulate on outside terraine/incline surface at min A with rolling walker  Long term goal 8: Pt able to ambulate distance of 60 to 70 ft for 2MWT to improve fucntion and gait speed. Long term goal 5: Family training for safe functional mobility for DC home    PLAN OF CARE/SAFETY  Safety Devices  Type of Devices: All fall risk precautions in place;Call light within reach;Gait belt;Patient at risk for falls; Left in bed;Nurse notified; Bed alarm in place  Restraints  Restraints Initially in Place: No      Therapy Time         04/21/22 1030 04/21/22 1315   PT Individual Minutes   Time In 1030 1350   Time Out 1115 1428   Minutes 45 38   PT Concurrent Minutes   Time In  --  1315   Time Out  --  1350   Minutes  --  [address], [place], 04/21/22 at 5:42 PM

## 2022-04-21 NOTE — PROGRESS NOTES
Occupational Therapy  Facility/Department: [facility] ACUTE REHAB  Rehabilitation Occupational Therapy Daily Treatment Note    Date: 22  Patient Name: Maria G Bañuelos       Room: 7038/7282-10  MRN: [id number]  Account: [de-identified]   : 1960  (62 y.o.) Gender: female                    Past Medical History:  has a past medical history of Anxiety, Arthritis, At maximum risk for fall, Cancer ([address] Cauda equina syndrome ([address] Cervical stenosis of spine, GERD (gastroesophageal reflux disease), History of stomach ulcers, Hypertension, Hypothyroidism, Lumbar neuritis, Post laminectomy syndrome, Spinal deformity, Thyroid disease, Uses wheelchair, Wears dentures, and Wellness examination. Past Surgical History:   has a past surgical history that includes hernia repair (Bilateral); Breast surgery (Right); Mastectomy, radical; Hysterectomy, total abdominal; Lumbar spine surgery (N/A, 2021); back surgery; Colonoscopy (about ); other surgical history (2022); cervical fusion (N/A, 2022); and cervical fusion (N/A, 2022). Restrictions  Restrictions/Precautions: Fall Risk;General Precautions  Other position/activity restrictions: s/p C2-C7 fixation  Required Braces or Orthoses  Cervical: c-collar (on when OOB)  Required Braces or Orthoses?: Yes    Subjective  Subjective: \"I want to do it (work hard) so I can go home. \"  Restrictions/Precautions: Fall Risk;General Precautions             Objective     Orientation  Overall Orientation Status: Within Functional Limits         ADL  Feeding  Assistance Level: Set-up  Grooming/Oral Hygiene  Assistance Level: Modified independent  Skilled Clinical Factors: in w/c at sink  Upper Extremity Bathing  Assistance Level: Set-up  Lower Extremity Bathing  Equipment Provided: Long-handled sponge  Assistance Level: Contact guard assist  Skilled Clinical Factors: CGA for standing balance for juanita care, seated for washing BLEs with LHS  Upper Extremity Dressing  Assistance Level: Minimal assistance  Skilled Clinical Factors: A changing C-collar padding after shower and tuck shirt under collar  Lower Extremity Dressing  Equipment Provided: Reachers  Assistance Level: Minimal assistance  Skilled Clinical Factors: min- CGA  for standing balance bringing pants/brief over hips, able to don/doff /pants/pullups using reacher. Putting On/Taking Off Footwear  Assistance Level: Minimal assistance  Skilled Clinical Factors: TA teds: shoes: initially able to cross RLE and needed A to tie, mod A on L shoe- using reachers and long shoe horn on floor,  this pm practiced and provided elastic shoe laces, set up R shoe, min with L.  pt discovered she can use UE to assist LLE into crossed position and can don shoe this way. in prep to lay down in bed pt able to indep doff B shoes using toes to push on heels while seated EOB. Toileting  Assistance Level: Minimal assistance  Skilled Clinical Factors: MIN A for standing during juanita care  Toilet Transfers  Technique: Stand pivot; To the left (onto toilet, ambulating to bed off toilet with RW)  Equipment: Grab bars;Standard toilet  Additional Factors: Verbal cues;Cues for hand placement; Increased time to complete; With handrails  Assistance Level: Minimal assistance     Functional Mobility  Device: Rolling walker  Activity: To/From bathroom  Assistance Level: Minimal assistance  Skilled Clinical Factors: min- CGA ambulate 18 feet from toilet to bed this pm, ataxic gait but no LOB  Sit to Stand  Assistance Level: Minimal assistance  Skilled Clinical Factors: min- CGA  Stand to Sit  Assistance Level: Contact guard assist  Stand Pivot  Assistance Level: Minimal assistance  Skilled Clinical Factors: min- CGA   OT Exercises  Dynamic Standing Balance Exercises: 2-4 min x 4 during adls with RW this am.   3 min, 5 min this pm     Assessment  Assessment  Activity Tolerance: Patient limited by pain; Patient limited by fatigue;Patient limited by endurance (pt was up in w/c with c collar on from 8:30 am to 3 pm, then requesting pain meds and returned to supine and removed collar)       Patient Education   indep with don shoes    Plan   continue POC    Goals       Therapy Time   Individual Concurrent Group Co-treatment   Time In 1429         Time Out 1510         Minutes 41             also am 8:30 to 9:34    Mikaela, OTR/L

## 2022-04-21 NOTE — PROGRESS NOTES
[facility] Internal Medicine  Sylvester Jaimes MD; Bobbi Kc MD; Gómez Walters MD; MD Wilber Estevez MD; Aide Arcos MD    KEENAN GERMANAriella [facility] Internal Medicine   [address] / HISTORY AND PHYSICAL EXAMINATION            Date:   4/20/2022  Patient name:  Zeb Blackwood  Date of admission:  4/15/2022  5:32 PM  MRN:   [id number]  Account:  [de-identified]  YOB: 1960  PCP:    Amanda Lopez MD  Room:   [address] [place] [address]  Code Status:    Full Code    Physician Requesting Consult: Jordan Troncoso MD    Reason for Consult: Medical management    Chief Complaint:     No chief complaint on file. Underwent anterior C5 corpectomy, posterior fixation from C2-T1 with arthrodesis, osteotomy, correction of deformity on April 12    History Obtained From:     patient    History of Present Illness: The patient is a 61 y.o. female presented for elective surgery. Underwent anterior C5 corpectomy, posterior fixation from C2-T1 with arthrodesis, osteotomy, correction of deformity on 4/12 with Dr. [place] 4/12. Symptoms include significant left-sided paresis, severe lower extremity weakness (wheelchair bound, now unable to stand or ambulate [id number]), progressively worsening paresthesias and dexterity issues with left upper extremity and left lower extremity.  Additionally complains of some moderate saddle anesthesia, urge incontinence but no bowel dysfunction.   Admitted to acute rehab for further management for deconditioning,  4/18   No new complaints  Pain is controlled on methadone  Working with therapy      Past Medical History:     Past Medical History:   Diagnosis Date    Anxiety     Arthritis     At maximum risk for fall 12/29/2021    caudal equina syndrome and cervical stenosis    Cancer ([address]     right breast    Cauda equina syndrome ([address] 12/29/2021    neuropathy, mobility difficulty, incontinance    Cervical stenosis of spine 12/29/2021    GERD (gastroesophageal reflux disease)     History of stomach ulcers     Hypertension     Dr. Lucy Poon    Hypothyroidism     Lumbar neuritis     Post laminectomy syndrome     Spinal deformity 11/2021    cervical and lumbar    Thyroid disease     Uses wheelchair     Wears dentures     Wellness examination     Dr. Lucy Poon        Past Surgical History:     Past Surgical History:   Procedure Laterality Date    BACK SURGERY      lower back x 3, cervical- x 1: C4- C6, 11/4/2014     BREAST SURGERY Right     mastectomy with reconstruction    CERVICAL FUSION N/A 4/12/2022    C5 CORPECTOMY, USE OF INTRAOPERATIVE CERVICAL TRACTION performed by Kristel Simon DO at [address] [place] [address] N/A 4/12/2022    POSTERIOR FIXATION C2-C7 performed by Kristel Simon DO at [address] [place] [address]  about 2018    HERNIA REPAIR Bilateral     inguinal    HYSTERECTOMY, TOTAL ABDOMINAL      LUMBAR SPINE SURGERY N/A 12/30/2021    LUMBAR LAMINECTOMY L2-S1 performed by Kristel Simon DO at [address]  04/12/2022    C5 CORPECTOMY, USE OF INTRAOPERATIVE CERVICAL TRACTION (N/A Spine Cervical)         Medications Prior to Admission:     Prior to Admission medications    Medication Sig Start Date End Date Taking?  Authorizing Provider   dilTIAZem (DILACOR XR) 180 MG extended release capsule TAKE ONE CAPSULE BY MOUTH DAILY 4/1/22   Tao Rutledge MD   ferrous sulfate (IRON 325) 325 (65 Fe) MG tablet Take 1 tablet by mouth 2 times daily (with meals) 1/13/22   Milly Sherman MD   docusate sodium (COLACE) 100 MG capsule Take 2 capsules by mouth 2 times daily 1/13/22   Milly Sherman MD   pantoprazole (PROTONIX) 40 MG tablet TAKE ONE TABLET BY MOUTH DAILY 12/20/21   Farrukh Giron MD   busPIRone (BUSPAR) 30 MG tablet Take 30 mg by mouth 2 times daily 12/17/21   Farrukh Giron MD   levothyroxine (SYNTHROID) 88 MCG tablet Take 1 tablet by mouth Daily 12/17/21 3/18/22  Lord Guardian, MD   tiZANidine (ZANAFLEX) 4 MG tablet Take 4 mg by mouth every 12 hours    Historical Provider, MD   Vitamin D, Ergocalciferol, 50 MCG (2000 UT) CAPS TAKE ONE CAPSULE BY MOUTH DAILY 3/29/21   Carlos Mccain PA-C   HYDROcodone-acetaminophen [facility]) 7.5-325 MG per tablet Up to three tablets a day. 10/21/15   Historical Provider, MD   methadone (DOLOPHINE) 10 MG tablet Take 10 mg by mouth 2 times daily. Historical Provider, MD        Allergies:     Latex and Kiwi extract    Social History:     Tobacco:    reports that she quit smoking about 8 years ago. She has a 15.00 pack-year smoking history. She has never used smokeless tobacco.  Alcohol:      reports current alcohol use. Drug Use:  reports no history of drug use. Family History:     Family History   Problem Relation Age of Onset    Hypertension Mother     Heart Disease Mother     Cancer Mother        Review of Systems:     Positive and Negative as described in HPI. CONSTITUTIONAL:  negative for fevers, chills, sweats, fatigue, weight loss  HEENT:  negative for vision, hearing changes, runny nose, throat pain  RESPIRATORY:  negative for shortness of breath, cough, congestion, wheezing. CARDIOVASCULAR:  negative for chest pain, palpitations.   GASTROINTESTINAL:  negative for nausea, vomiting, diarrhea, constipation, change in bowel habits, abdominal pain   GENITOURINARY:  negative for difficulty of urination, burning with urination, frequency   INTEGUMENT:  negative for rash, skin lesions, easy bruising   HEMATOLOGIC/LYMPHATIC:  negative for swelling/edema   ALLERGIC/IMMUNOLOGIC:  negative for urticaria , itching  ENDOCRINE:  negative increase in drinking, increase in urination, hot or cold intolerance  MUSCULOSKELETAL:  negative joint pains, muscle aches, swelling of joints  NEUROLOGICAL:  negative for headaches, dizziness, lightheadedness, numbness, pain, tingling extremities  BEHAVIOR/PSYCH: negative for depression, anxiety    Physical Exam:     /73   Pulse 95   Temp 98.6 °F (37 °C) (Oral)   Resp 18   Ht 5' 1\" (1.549 m)   SpO2 97%   BMI 26.83 kg/m²   Temp (24hrs), Av.4 °F (36.9 °C), Min:98.2 °F (36.8 °C), Max:98.6 °F (37 °C)    No results for input(s): POCGLU in the last 72 hours. Intake/Output Summary (Last 24 hours) at 2022  Last data filed at 2022 1141  Gross per 24 hour   Intake 120 ml   Output 400 ml   Net -280 ml       General Appearance:  alert, well appearing, and in no acute distress  Mental status: oriented to person, place, and time with normal affect  Head:  normocephalic, atraumatic. Eye: no icterus, redness, pupils equal and reactive, extraocular eye movements intact, conjunctiva clear  Ear: normal external ear, no discharge, hearing intact  Nose:  no drainage noted  Mouth: mucous membranes moist  Neck: supple, no carotid bruits, thyroid not palpable  Lungs: Bilateral equal air entry, clear to ausculation, no wheezing, rales or rhonchi, normal effort  Cardiovascular: normal rate, regular rhythm, no murmur, gallop, rub. Abdomen: Soft, nontender, nondistended, normal bowel sounds, no hepatomegaly or splenomegaly  Neurologic: There are no new focal motor or sensory deficits, normal muscle tone and bulk, no abnormal sensation, normal speech, cranial nerves II through XII grossly intact  Skin: No gross lesions, rashes, bruising or bleeding on exposed skin area  Extremities:  peripheral pulses palpable, no pedal edema or calf pain with palpation  Psych: normal affect    Investigations:      Laboratory Testing:  No results found for this or any previous visit (from the past 24 hour(s)). Imaging/Diagonstics:  XR CERVICAL SPINE (2-3 VIEWS)    Result Date: 2022  Expected postoperative findings status post anterior and posterior cervical fusion. XR CHEST PORTABLE    Result Date: 2022  No acute cardiopulmonary disease.      XR CERVICAL SPINE FLEXION AND EXTENSION    Result Date: 4/13/2022  1. Retrolisthesis of C2 on C3 measures 2 mm on extension view and reduces on flexion view. 2. No evidence of instability of anterolisthesis of C3 on C4 measuring 1.5 mm. 3. No acute fracture on limited views. Of note, the C6-C7 and C7-T1 levels are not well seen due to overlying soft tissues. 4. Severe multilevel degenerative changes. CTA NECK W CONTRAST    Result Date: 4/12/2022  Unremarkable CTA of the neck. Minor hazy pleural thickening and chronic-appearing medial right upper lobe atelectasis/scarring. If there is any clinical concern, follow-up noncontrast chest CT may be useful. RECOMMENDATIONS: Unavailable       Assessment :      Hospital Problems           Last Modified POA    * (Principal) Spinal cord injury, cervical region, sequela ([facility] 4/15/2022 Yes    Essential hypertension 4/16/2022 Yes    Hypothyroidism 4/16/2022 Yes    Post-menopausal osteoporosis 4/16/2022 Yes    Cervical myelopathy ([facility] 4/16/2022 Yes    Anemia, normocytic normochromic 4/16/2022 Yes          Plan:     1. Spinal cord injury status post anterior C5 corpectomy, posterior fixation from C2-T1 with arthrodesis, osteotomy,  2. Essential hypertension,  Controlled, continue monitor blood pressure, continue home medications, added lisinopril, diltiazem,Controlled   3. Hyponatremia, better now ,  4. Constipation , prn meds   5. Hypothyroidism, continue levothyroxine,  6. Continue physical therapy,  7. DVT prophylaxis    Consultations:   Orin Mcdaniel  IP CONSULT TO SOCIAL WORK  IP CONSULT TO INTERNAL MEDICINE      Craig Multani MD  4/20/2022  9:33 PM    Copy sent to Dr. Florentino Muir MD    Please note that this chart was generated using voice recognition Dragon dictation software. Although every effort was made to ensure the accuracy of this automated transcription, some errors in transcription may have occurred.

## 2022-04-22 PROCEDURE — 99231 SBSQ HOSP IP/OBS SF/LOW 25: CPT | Performed by: INTERNAL MEDICINE

## 2022-04-22 PROCEDURE — 6370000000 HC RX 637 (ALT 250 FOR IP): Performed by: PHYSICAL MEDICINE & REHABILITATION

## 2022-04-22 PROCEDURE — 6370000000 HC RX 637 (ALT 250 FOR IP): Performed by: INTERNAL MEDICINE

## 2022-04-22 PROCEDURE — 97530 THERAPEUTIC ACTIVITIES: CPT

## 2022-04-22 PROCEDURE — 97112 NEUROMUSCULAR REEDUCATION: CPT

## 2022-04-22 PROCEDURE — 6370000000 HC RX 637 (ALT 250 FOR IP): Performed by: REGISTERED NURSE

## 2022-04-22 PROCEDURE — 97535 SELF CARE MNGMENT TRAINING: CPT

## 2022-04-22 PROCEDURE — 6360000002 HC RX W HCPCS: Performed by: REGISTERED NURSE

## 2022-04-22 PROCEDURE — 97110 THERAPEUTIC EXERCISES: CPT

## 2022-04-22 PROCEDURE — 1180000000 HC REHAB R&B

## 2022-04-22 PROCEDURE — 97116 GAIT TRAINING THERAPY: CPT

## 2022-04-22 PROCEDURE — 99232 SBSQ HOSP IP/OBS MODERATE 35: CPT | Performed by: PHYSICAL MEDICINE & REHABILITATION

## 2022-04-22 RX ADMIN — BUSPIRONE HYDROCHLORIDE 30 MG: 15 TABLET ORAL at 08:09

## 2022-04-22 RX ADMIN — METHADONE HYDROCHLORIDE 10 MG: 10 TABLET ORAL at 20:12

## 2022-04-22 RX ADMIN — FERROUS SULFATE TAB 325 MG (65 MG ELEMENTAL FE) 325 MG: 325 (65 FE) TAB at 08:01

## 2022-04-22 RX ADMIN — LEVOTHYROXINE SODIUM 88 MCG: 0.09 TABLET ORAL at 06:36

## 2022-04-22 RX ADMIN — OXYCODONE HYDROCHLORIDE 10 MG: 10 TABLET ORAL at 14:43

## 2022-04-22 RX ADMIN — ACETAMINOPHEN 650 MG: 325 TABLET ORAL at 23:43

## 2022-04-22 RX ADMIN — BACLOFEN 10 MG: 10 TABLET ORAL at 08:09

## 2022-04-22 RX ADMIN — LISINOPRIL 5 MG: 5 TABLET ORAL at 08:01

## 2022-04-22 RX ADMIN — ACETAMINOPHEN 650 MG: 325 TABLET ORAL at 12:24

## 2022-04-22 RX ADMIN — METHADONE HYDROCHLORIDE 10 MG: 10 TABLET ORAL at 08:03

## 2022-04-22 RX ADMIN — BUSPIRONE HYDROCHLORIDE 30 MG: 15 TABLET ORAL at 20:13

## 2022-04-22 RX ADMIN — POLYMYXIN B SULFATE, BACITRACIN ZINC, NEOMYCIN SULFATE: 5000; 3.5; 4 OINTMENT TOPICAL at 20:15

## 2022-04-22 RX ADMIN — PANTOPRAZOLE SODIUM 40 MG: 40 TABLET, DELAYED RELEASE ORAL at 06:36

## 2022-04-22 RX ADMIN — ACETAMINOPHEN 650 MG: 325 TABLET ORAL at 06:36

## 2022-04-22 RX ADMIN — DOCUSATE SODIUM 200 MG: 100 CAPSULE, LIQUID FILLED ORAL at 20:12

## 2022-04-22 RX ADMIN — DOCUSATE SODIUM 200 MG: 100 CAPSULE, LIQUID FILLED ORAL at 08:01

## 2022-04-22 RX ADMIN — DILTIAZEM HYDROCHLORIDE 180 MG: 180 CAPSULE, COATED, EXTENDED RELEASE ORAL at 08:09

## 2022-04-22 RX ADMIN — OXYCODONE HYDROCHLORIDE 10 MG: 10 TABLET ORAL at 20:12

## 2022-04-22 RX ADMIN — OXYCODONE HYDROCHLORIDE 10 MG: 10 TABLET ORAL at 08:02

## 2022-04-22 RX ADMIN — POLYMYXIN B SULFATE, BACITRACIN ZINC, NEOMYCIN SULFATE: 5000; 3.5; 4 OINTMENT TOPICAL at 08:25

## 2022-04-22 RX ADMIN — ACETAMINOPHEN 650 MG: 325 TABLET ORAL at 17:40

## 2022-04-22 RX ADMIN — ENOXAPARIN SODIUM 40 MG: 100 INJECTION SUBCUTANEOUS at 08:01

## 2022-04-22 RX ADMIN — BACLOFEN 10 MG: 10 TABLET ORAL at 17:40

## 2022-04-22 RX ADMIN — FERROUS SULFATE TAB 325 MG (65 MG ELEMENTAL FE) 325 MG: 325 (65 FE) TAB at 17:40

## 2022-04-22 RX ADMIN — BACLOFEN 10 MG: 10 TABLET ORAL at 12:25

## 2022-04-22 RX ADMIN — POLYETHYLENE GLYCOL 3350 17 G: 17 POWDER, FOR SOLUTION ORAL at 08:01

## 2022-04-22 RX ADMIN — BACLOFEN 10 MG: 10 TABLET ORAL at 20:12

## 2022-04-22 ASSESSMENT — PAIN DESCRIPTION - FREQUENCY: FREQUENCY: CONTINUOUS

## 2022-04-22 ASSESSMENT — PAIN SCALES - GENERAL
PAINLEVEL_OUTOF10: 9
PAINLEVEL_OUTOF10: 5
PAINLEVEL_OUTOF10: 7
PAINLEVEL_OUTOF10: 7
PAINLEVEL_OUTOF10: 8
PAINLEVEL_OUTOF10: 7
PAINLEVEL_OUTOF10: 8
PAINLEVEL_OUTOF10: 4

## 2022-04-22 ASSESSMENT — PAIN DESCRIPTION - LOCATION
LOCATION: OTHER (COMMENT)
LOCATION: BACK
LOCATION: BACK
LOCATION: NECK
LOCATION: BACK
LOCATION: NECK

## 2022-04-22 ASSESSMENT — PAIN DESCRIPTION - ORIENTATION
ORIENTATION: LOWER
ORIENTATION: LOWER;MID

## 2022-04-22 ASSESSMENT — PAIN - FUNCTIONAL ASSESSMENT
PAIN_FUNCTIONAL_ASSESSMENT: ACTIVITIES ARE NOT PREVENTED
PAIN_FUNCTIONAL_ASSESSMENT: ACTIVITIES ARE NOT PREVENTED

## 2022-04-22 ASSESSMENT — PAIN DESCRIPTION - DESCRIPTORS
DESCRIPTORS: ACHING

## 2022-04-22 NOTE — PROGRESS NOTES
[facility] Internal Medicine  Lenny Garay MD; Malik Goldman MD; Sim Ahmadi MD; MD Phylicia Gomez MD; MD KEENAN Flores JAriella [facility] Internal Medicine   [address] / HISTORY AND PHYSICAL EXAMINATION            Date:   4/22/2022  Patient name:  Dashawn Lima  Date of admission:  4/15/2022  5:32 PM  MRN:   [id number]  Account:  [de-identified]  YOB: 1960  PCP:    Verna Rinne, MD  Room:   [address] [place] [address]  Code Status:    Full Code    Physician Requesting Consult: Beto Borden MD    Reason for Consult: Medical management    Chief Complaint:     No chief complaint on file. Underwent anterior C5 corpectomy, posterior fixation from C2-T1 with arthrodesis, osteotomy, correction of deformity on April 12    History Obtained From:     patient    History of Present Illness: The patient is a 61 y.o. female presented for elective surgery. Underwent anterior C5 corpectomy, posterior fixation from C2-T1 with arthrodesis, osteotomy, correction of deformity on 4/12 with Dr. Maia Bryant 4/12. Symptoms include significant left-sided paresis, severe lower extremity weakness (wheelchair bound, now unable to stand or ambulate [id number]), progressively worsening paresthesias and dexterity issues with left upper extremity and left lower extremity.  Additionally complains of some moderate saddle anesthesia, urge incontinence but no bowel dysfunction.   Admitted to acute rehab for further management for deconditioning,  4/22  No new complaints  Pain is controlled on methadone  Working with therapy      Past Medical History:     Past Medical History:   Diagnosis Date    Anxiety     Arthritis     At maximum risk for fall 12/29/2021    caudal equina syndrome and cervical stenosis    Cancer ([address]     right breast    Cauda equina syndrome ([address] 12/29/2021    neuropathy, mobility difficulty, incontinance    Cervical stenosis of spine 12/29/2021    GERD (gastroesophageal reflux disease)     History of stomach ulcers     Hypertension     Dr. Michael Guzmán    Hypothyroidism     Lumbar neuritis     Post laminectomy syndrome     Spinal deformity 11/2021    cervical and lumbar    Thyroid disease     Uses wheelchair     Wears dentures     Wellness examination     Dr. Michael Guzmán        Past Surgical History:     Past Surgical History:   Procedure Laterality Date    BACK SURGERY      lower back x 3, cervical- x 1: C4- C6, 11/4/2014     BREAST SURGERY Right     mastectomy with reconstruction    CERVICAL FUSION N/A 4/12/2022    C5 CORPECTOMY, USE OF INTRAOPERATIVE CERVICAL TRACTION performed by Niurka Cueva DO at [address] [place] [address] N/A 4/12/2022    POSTERIOR FIXATION C2-C7 performed by Niurka Cueva DO at [facility]  about 2018    HERNIA REPAIR Bilateral     inguinal    HYSTERECTOMY, TOTAL ABDOMINAL      LUMBAR SPINE SURGERY N/A 12/30/2021    LUMBAR LAMINECTOMY L2-S1 performed by Niurka Cueva DO at [address] HISTORY  04/12/2022    C5 CORPECTOMY, USE OF INTRAOPERATIVE CERVICAL TRACTION (N/A Spine Cervical)         Medications Prior to Admission:     Prior to Admission medications    Medication Sig Start Date End Date Taking?  Authorizing Provider   dilTIAZem (DILACOR XR) 180 MG extended release capsule TAKE ONE CAPSULE BY MOUTH DAILY 4/1/22   Johanne Hitchcock MD   ferrous sulfate (IRON 325) 325 (65 Fe) MG tablet Take 1 tablet by mouth 2 times daily (with meals) 1/13/22   Kennedi Smith MD   docusate sodium (COLACE) 100 MG capsule Take 2 capsules by mouth 2 times daily 1/13/22   Kennedi Smith MD   pantoprazole (PROTONIX) 40 MG tablet TAKE ONE TABLET BY MOUTH DAILY 12/20/21   Nico Blake MD   busPIRone (BUSPAR) 30 MG tablet Take 30 mg by mouth 2 times daily 12/17/21   Nico Blake MD   levothyroxine (SYNTHROID) 88 MCG tablet Take 1 tablet by mouth Daily 12/17/21 3/18/22  Lazara Cornelius MD   tiZANidine (ZANAFLEX) 4 MG tablet Take 4 mg by mouth every 12 hours    Historical Provider, MD   Vitamin D, Ergocalciferol, 50 MCG (2000 UT) CAPS TAKE ONE CAPSULE BY MOUTH DAILY 3/29/21   Rachelle Carter PA-C   HYDROcodone-acetaminophen [facility]) 7.5-325 MG per tablet Up to three tablets a day. 10/21/15   Historical Provider, MD   methadone (DOLOPHINE) 10 MG tablet Take 10 mg by mouth 2 times daily. Historical Provider, MD        Allergies:     Latex and Kiwi extract    Social History:     Tobacco:    reports that she quit smoking about 8 years ago. She has a 15.00 pack-year smoking history. She has never used smokeless tobacco.  Alcohol:      reports current alcohol use. Drug Use:  reports no history of drug use. Family History:     Family History   Problem Relation Age of Onset    Hypertension Mother     Heart Disease Mother     Cancer Mother        Review of Systems:     Positive and Negative as described in HPI. CONSTITUTIONAL:  negative for fevers, chills, sweats, fatigue, weight loss  HEENT:  negative for vision, hearing changes, runny nose, throat pain  RESPIRATORY:  negative for shortness of breath, cough, congestion, wheezing. CARDIOVASCULAR:  negative for chest pain, palpitations.   GASTROINTESTINAL:  negative for nausea, vomiting, diarrhea, constipation, change in bowel habits, abdominal pain   GENITOURINARY:  negative for difficulty of urination, burning with urination, frequency   INTEGUMENT:  negative for rash, skin lesions, easy bruising   HEMATOLOGIC/LYMPHATIC:  negative for swelling/edema   ALLERGIC/IMMUNOLOGIC:  negative for urticaria , itching  ENDOCRINE:  negative increase in drinking, increase in urination, hot or cold intolerance  MUSCULOSKELETAL:  negative joint pains, muscle aches, swelling of joints  NEUROLOGICAL:  negative for headaches, dizziness, lightheadedness, numbness, pain, tingling extremities  BEHAVIOR/PSYCH: negative for depression, anxiety    Physical Exam:     BP (!) 160/83   Pulse 91   Temp 97.9 °F (36.6 °C) (Oral)   Resp 18   Ht 5' 1\" (1.549 m)   Wt 140 lb (63.5 kg)   SpO2 99%   BMI 26.45 kg/m²   Temp (24hrs), Av.5 °F (36.9 °C), Min:97.9 °F (36.6 °C), Max:99 °F (37.2 °C)    No results for input(s): POCGLU in the last 72 hours. No intake or output data in the 24 hours ending 22 1551    General Appearance:  alert, well appearing, and in no acute distress  Mental status: oriented to person, place, and time with normal affect  Head:  normocephalic, atraumatic. Eye: no icterus, redness, pupils equal and reactive, extraocular eye movements intact, conjunctiva clear  Ear: normal external ear, no discharge, hearing intact  Nose:  no drainage noted  Mouth: mucous membranes moist  Neck: supple, no carotid bruits, thyroid not palpable  Lungs: Bilateral equal air entry, clear to ausculation, no wheezing, rales or rhonchi, normal effort  Cardiovascular: normal rate, regular rhythm, no murmur, gallop, rub. Abdomen: Soft, nontender, nondistended, normal bowel sounds, no hepatomegaly or splenomegaly  Neurologic: There are no new focal motor or sensory deficits, normal muscle tone and bulk, no abnormal sensation, normal speech, cranial nerves II through XII grossly intact  Skin: No gross lesions, rashes, bruising or bleeding on exposed skin area  Extremities:  peripheral pulses palpable, no pedal edema or calf pain with palpation  Psych: normal affect    Investigations:      Laboratory Testing:  No results found for this or any previous visit (from the past 24 hour(s)). Imaging/Diagonstics:  XR CERVICAL SPINE (2-3 VIEWS)    Result Date: 2022  Expected postoperative findings status post anterior and posterior cervical fusion. XR CHEST PORTABLE    Result Date: 2022  No acute cardiopulmonary disease. XR CERVICAL SPINE FLEXION AND EXTENSION    Result Date: 2022  1.  Retrolisthesis of C2 on C3 measures 2 mm on extension view and reduces on flexion view. 2. No evidence of instability of anterolisthesis of C3 on C4 measuring 1.5 mm. 3. No acute fracture on limited views. Of note, the C6-C7 and C7-T1 levels are not well seen due to overlying soft tissues. 4. Severe multilevel degenerative changes. CTA NECK W CONTRAST    Result Date: 4/12/2022  Unremarkable CTA of the neck. Minor hazy pleural thickening and chronic-appearing medial right upper lobe atelectasis/scarring. If there is any clinical concern, follow-up noncontrast chest CT may be useful. RECOMMENDATIONS: Unavailable       Assessment :      Hospital Problems           Last Modified POA    * (Principal) Spinal cord injury, cervical region, sequela ([facility] [address] 4/15/2022 Yes    Essential hypertension 4/16/2022 Yes    Hypothyroidism 4/16/2022 Yes    Post-menopausal osteoporosis 4/16/2022 Yes    Cervical myelopathy ([facility] [address] 4/16/2022 Yes    Anemia, normocytic normochromic 4/16/2022 Yes          Plan:     1. Spinal cord injury status post anterior C5 corpectomy, posterior fixation from C2-T1 with arthrodesis, osteotomy,  2. Essential hypertension,  Controlled, continue monitor blood pressure, continue home medications, added lisinopril, diltiazem,Controlled   3. Hyponatremia, better now ,  4. Constipation , prn meds   5. Hypothyroidism, continue levothyroxine,  6. Continue physical therapy,  7. DVT prophylaxis    Consultations:   IP CONSULT TO DIETITIAN  IP CONSULT TO SOCIAL WORK  IP CONSULT TO INTERNAL MEDICINE      Julian Singh MD  4/22/2022  3:51 PM    Copy sent to Dr. Farrukh Giron MD    Please note that this chart was generated using voice recognition Dragon dictation software. Although every effort was made to ensure the accuracy of this automated transcription, some errors in transcription may have occurred.

## 2022-04-22 NOTE — PROGRESS NOTES
Physical Medicine & Rehabilitation  Progress Note      Subjective:      62year-old female with nontraumatic cervical spinal cord injury secondary to servical stenosis. Patient is doing well today. She notes pain is controlled and has had no further issue with muscle spasms. She has persistent L foot drop. She had BM the other day and feels distention/bloating is improved. ROS:  Denies fevers, chills, sweats. No chest pain, palpitations, lightheadedness. Denies coughing, wheezing or shortness of breath. Denies abdominal pain, nausea, diarrhea   No new areas of joint pain. Denies new areas of numbness or weakness. Denies new anxiety or depression issues. No new skin problems. Rehabilitation:   Progressing in therapies. PT:  Restrictions/Precautions: Fall Risk,General Precautions  Implants present? : Metal implants (Lumbar/cervical surgery)  Other position/activity restrictions: s/p C2-C7 fixation  Required Braces or Orthoses  Cervical: c-collar (on when OOB)   Transfers  Sit to Stand: Moderate Assistance  Stand to sit: Moderate Assistance  Bed to Chair: Moderate assistance  Comment: Cues for errect posture, decreased coordination noted B LE L LE> R LE. Ambulation  Surface: level tile  Device: Rolling Walker  Other Apparatus: Wheelchair follow  Assistance: Minimal assistance  Quality of Gait: Pt demos difficulties with coordination. Pt demos an ataxic   Gait Deviations: Slow Sharon,Decreased step length,Decreased arm swing  Distance: 201' total. PT requires a seated rest break every ~50' (Breaks given PRN.)  More Ambulation?: No    Transfers  Sit to Stand: Moderate Assistance  Stand to sit: Moderate Assistance  Bed to Chair: Moderate assistance  Comment: Cues for errect posture, decreased coordination noted B LE L LE> R LE.      Ambulation  Surface: level tile  Device: Rolling Walker  Other Apparatus: Wheelchair follow  Assistance: Minimal assistance  Quality of Gait: Pt demos difficulties with coordination. Pt demos an ataxic   Gait Deviations: Slow Sharon,Decreased step length,Decreased arm swing  Distance: 201' total. PT requires a seated rest break every ~50' (Breaks given PRN.)  More Ambulation?: No    Surface: level tile  Ambulation  Surface: level tile  Device: Rolling Walker  Other Apparatus: Wheelchair follow  Assistance: Minimal assistance  Quality of Gait: Pt demos difficulties with coordination. Pt demos an ataxic   Gait Deviations: Slow Sharon,Decreased step length,Decreased arm swing  Distance: 201' total. PT requires a seated rest break every ~50' (Breaks given PRN.)  More Ambulation?: No    OT:  ADL  Equipment Provided: Sock aid,Long-handled sponge,Reacher  Feeding: Setup  Grooming: Minimal assistance  UE Bathing: Stand by assistance  LE Bathing: Minimal assistance  UE Dressing: Verbal cueing,Minimal assistance (standing during juanita care )  LE Dressing: Minimal assistance  Toileting: Minimal assistance  Additional Comments: Pt completed full shower this date. Seated on tub bench, utilizing hand held shower head and long handled sponge. Pt nust leav on c-collar during shower, changing to spare after shower is complete. Balance  Sitting Balance: Stand by assistance  Standing Balance: Minimal assistance   Standing Balance  Time: 1-2 min x3   Activity: transfers ,ADL activites   Comment: 1-2 UE support  Functional Mobility  Functional - Mobility Device: Wheelchair  Activity: To/from bathroom  Assist Level: Dependent/Total  Functional Mobility Comments: did not self propel      Bed mobility  Bridging:  (not indicated at this time)  Supine to Sit: Contact guard assistance  Sit to Supine: Minimal assistance  Scooting: Minimal assistance  Comment: PT mat, wedge, 3 pillows.    Transfers  Stand Step Transfers: Minimal assistance  Stand Pivot Transfers: Minimal assistance  Sit to stand: Minimal assistance  Stand to sit: Minimal assistance  Transfer Comments: Pt min A for transfers this date with verbal cues required for hand/foot placement during transfers for safety. Toilet Transfers  Toilet - Technique: Stand pivot,To left,To right  Equipment Used: Grab bars  Toilet Transfer: Minimal assistance  Toilet Transfers Comments: Verbal cues for hand placement and safety. L side weakness with LLE buckling. Shower Transfers  Shower - Transfer From: Wheelchair  Shower - Transfer Type: To and From  Shower - Transfer To: Transfer tub bench  Shower - Technique: Stand pivot,To right,To left  Shower Transfers: Contact Guard  Shower Transfers Comments: cues for safety   Wheelchair Bed Transfers  Wheelchair/Bed - Technique: Stand pivot  Equipment Used: Bed,Wheelchair  Level of Asssistance: Minimal assistance  Wheelchair Transfers Comments: Verbal cues for hand placement and safety. L side weakness with LLE buckling. SPEECH:      Objective:  BP (!) 160/83   Pulse 91   Temp 97.9 °F (36.6 °C) (Oral)   Resp 16   Ht 5' 1\" (1.549 m)   Wt 140 lb (63.5 kg)   SpO2 99%   BMI 26.45 kg/m²       GEN: Well developed, well nourished, in NAD  HEENT:  NCAT. PERRL. EOMI. Mucous membranes pink and moist. Hard cervical collar in place  PULM:  Clear to ausculation. No rales or rhonchi. Respirations WNL and unlabored. CV:  Regular rate rhythm. No murmurs or gallops. GI:  Abdomen soft. Tender, distended. BS + and equal.    NEUROLOGICAL: A&O x3. Sensation intact to light touch. .   MSK:  Functional ROM BUE and BLEs. Motor testing 4/5 key muscles BUEs, 4-/5 B hip flexion, 4-/5 R knee extension, 4-/5 L knee extension, 4-/5 R dorsiflexion, 3/5 L dorsiflexion. SKIN: Warm dry and intact. Good turgor. EXTREMITIES:  No calf tenderness to palpation. No edema BLEs. PSYCH: Mood WNL. Appropriately interactive. Affect WNL. Diagnostics:     CBC: No results for input(s): WBC, RBC, HGB, HCT, MCV, RDW, PLT in the last 72 hours.   BMP:   No results for input(s): NA, K, CL, CO2, PHOS, BUN, CREATININE, CA, GLUCOSE in the last 72 hours. BNP: No results for input(s): BNP in the last 72 hours. PT/INR: No results for input(s): PROTIME, INR in the last 72 hours. APTT: No results for input(s): APTT in the last 72 hours. CARDIAC ENZYMES: No results for input(s): CKMB, CKMBINDEX, TROPONINT in the last 72 hours. Invalid input(s): CKTOTAL;3 troponins   FASTING LIPID PANEL:  Lab Results   Component Value Date    CHOL 198 07/23/2020     (A) 07/23/2020    TRIG 96 07/23/2020     LIVER PROFILE: No results for input(s): AST, ALT, ALB, BILIDIR, BILITOT, ALKPHOS in the last 72 hours.      Current Medications:   Current Facility-Administered Medications: vitamin D3 (CHOLECALCIFEROL) tablet 5,000 Units, 5,000 Units, Oral, Weekly  baclofen (LIORESAL) tablet 10 mg, 10 mg, Oral, 4x Daily  lisinopril (PRINIVIL;ZESTRIL) tablet 5 mg, 5 mg, Oral, Daily  bisacodyl (DULCOLAX) suppository 10 mg, 10 mg, Rectal, Daily PRN  polyethylene glycol (GLYCOLAX) packet 17 g, 17 g, Oral, Daily  senna (SENOKOT) tablet 17.2 mg, 2 tablet, Oral, Daily PRN  acetaminophen (TYLENOL) tablet 650 mg, 650 mg, Oral, Q6H  magnesium hydroxide (MILK OF MAGNESIA) 400 MG/5ML suspension 30 mL, 30 mL, Oral, Daily PRN  ondansetron (ZOFRAN-ODT) disintegrating tablet 4 mg, 4 mg, Oral, Q8H PRN **OR** [DISCONTINUED] ondansetron (ZOFRAN) injection 4 mg, 4 mg, IntraVENous, Q6H PRN  oxyCODONE (ROXICODONE) immediate release tablet 5 mg, 5 mg, Oral, Q4H PRN **OR** oxyCODONE HCl (OXY-IR) immediate release tablet 10 mg, 10 mg, Oral, Q4H PRN  busPIRone (BUSPAR) tablet 30 mg, 30 mg, Oral, BID  dilTIAZem (CARDIZEM CD) extended release capsule 180 mg, 180 mg, Oral, Daily  docusate sodium (COLACE) capsule 200 mg, 200 mg, Oral, BID  enoxaparin (LOVENOX) injection 40 mg, 40 mg, SubCUTAneous, Daily  ferrous sulfate (IRON 325) tablet 325 mg, 325 mg, Oral, BID WC  levothyroxine (SYNTHROID) tablet 88 mcg, 88 mcg, Oral, Daily  methadone (DOLOPHINE) tablet 10 mg, 10 mg, Oral, BID  neomycin-bacitracin-polymyxin (NEOSPORIN) ointment, , Topical, BID  pantoprazole (PROTONIX) tablet 40 mg, 40 mg, Oral, Daily      Impression/Plan:   Impaired ADLs, gait, and mobility due to:      1. Nontraumatic cervical spinal cord injury secondary to cervical stenosis: s/p C5 corpectomy, C4-6 arthrodesis, open reduction cervical kyphosis, C2-7 posterior fusion performed by Dr. Irwin Guido on 4/12/2022. PT/OT for gait, mobility, strengthening, endurance, ADLs, and self care. Has Tylenol prn, methadone BID, robaxin prn, oxycodone prn for pain. She treats with [facility] for methadone and will require outpatient follow up with them at discharge and for methadone outpatient prescription. 2. Muscle spasms: resolved after discontinuing Robaxin and changed to routine baclofen. Dose increased 4/18. 3. Hx cauda equina syndrome: s/p L2-S1 laminectomy (December 2021)  4. L foot drop: therapy treating. Monitor for possible L AFO. 5. HTN: on Diltiazem. 6. Anemia: Hb low but improving. On ferrous sulfate. Monitoring. 7. Hypothyroidism: on levothyroxine. 8. Hyponatremia: stable. Monitoring. Medical management by IM  9. GERD: on pantoprazole. 10. Anxiety:on buspar. 11. Vitamin D deficiency: on repletion weekly  12. Hx breast cancer  13. Bowel Management: Miralax daily, senokot prn, dulcolax prn. 14. DVT Prophylaxis:  low molecular weight heparin, SCD's while in bed and HERMINIO's   15. Internal medicine for medical management      Electronically signed by Enrrique Phillips MD on 4/22/2022 at 8:38 AM      This note is created with the assistance of a speech recognition program.  While intending to generate a document that actually reflects the content of the visit, the document can still have some errors including those of syntax and sound a like substitutions which may escape proof reading. In such instances, actual meaning can be extrapolated by contextual diversion.

## 2022-04-22 NOTE — PLAN OF CARE
Problem: Skin Integrity:  Goal: Will show no infection signs and symptoms  Outcome: Progressing     Problem: Skin Integrity:  Goal: Absence of new skin breakdown  Outcome: Progressing     Problem: Falls - Risk of:  Goal: Will remain free from falls  Outcome: Progressing     Problem: Falls - Risk of:  Goal: Absence of physical injury  Outcome: Progressing     Problem: Discharge Planning:  Goal: Discharged to appropriate level of care  Outcome: Progressing     Problem: Pain:  Goal: Pain level will decrease  Outcome: Progressing     Problem: Pain:  Goal: Control of acute pain  Outcome: Progressing

## 2022-04-22 NOTE — PLAN OF CARE
Problem: Skin Integrity:  Goal: Will show no infection signs and symptoms  Description: Will show no infection signs and symptoms  4/22/2022 0138 by Candance Phoenix, LPN  Outcome: Progressing  4/21/2022 1346 by Ita Peña  Outcome: Progressing  Goal: Absence of new skin breakdown  Description: Absence of new skin breakdown  4/22/2022 0138 by Candance Phoenix, LPN  Outcome: Progressing  4/21/2022 1346 by Ita Peña  Outcome: Progressing     Problem: Falls - Risk of:  Goal: Will remain free from falls  Description: Will remain free from falls  4/22/2022 0138 by Candance Phoenix, LPN  Outcome: Progressing  4/21/2022 1346 by Ita Peña  Outcome: Progressing  Goal: Absence of physical injury  Description: Absence of physical injury  4/22/2022 0138 by Candance Phoenix, LPN  Outcome: Progressing  4/21/2022 1346 by Ita Peña  Outcome: Progressing     Problem: Pain:  Goal: Pain level will decrease  Description: Pain level will decrease  4/22/2022 0138 by Candance Phoenix, LPN  Outcome: Progressing  4/21/2022 1346 by Ita Peña  Outcome: Progressing  Goal: Control of acute pain  Description: Control of acute pain  4/22/2022 0138 by Candance Phoenix, LPN  Outcome: Progressing  4/21/2022 1346 by Ita Peña  Outcome: Progressing  Goal: Control of chronic pain  Description: Control of chronic pain  4/22/2022 0138 by Candance Phoenix, LPN  Outcome: Progressing  4/21/2022 1346 by Ita Peña  Outcome: Progressing     Problem: Musculor/Skeletal Functional Status  Goal: Highest potential functional level  4/22/2022 0138 by Candance Phoenix, LPN  Outcome: Progressing  4/21/2022 1346 by Ita Peña  Outcome: Progressing  Goal: Absence of falls  4/22/2022 0138 by Candance Phoenix, LPN  Outcome: Progressing  4/21/2022 1346 by Ita Peña  Outcome: Progressing

## 2022-04-22 NOTE — PROGRESS NOTES
Occupational Therapy  Facility/Department: [facility]  Rehabilitation Occupational Therapy Daily Treatment Note    Date: 22  Patient Name: Charlee Ness       Room: 00232  MRN: [id number]  Account: [de-identified]   : 1960  (62 y.o.) Gender: female     Past Medical History:  has a past medical history of Anxiety, Arthritis, At maximum risk for fall, Cancer ([address] Cauda equina syndrome ([facility] [address] Cervical stenosis of spine, GERD (gastroesophageal reflux disease), History of stomach ulcers, Hypertension, Hypothyroidism, Lumbar neuritis, Post laminectomy syndrome, Spinal deformity, Thyroid disease, Uses wheelchair, Wears dentures, and Wellness examination. Past Surgical History:   has a past surgical history that includes hernia repair (Bilateral); Breast surgery (Right); Mastectomy, radical; Hysterectomy, total abdominal; Lumbar spine surgery (N/A, 2021); back surgery; Colonoscopy (about ); other surgical history (2022); cervical fusion (N/A, 2022); and cervical fusion (N/A, 2022).     Restrictions  Restrictions/Precautions: Fall Risk;General Precautions  Implants present? : Metal implants (Lumbar/cervical surgery)  Other position/activity restrictions: s/p C2-C7 fixation  Required Braces or Orthoses  Cervical: c-collar (on when OOB)  Required Braces or Orthoses?: Yes (C-collar when OOB)    Subjective  Pain Level: 7  Pain Location: Neck  Restrictions/Precautions: Fall Risk;General Precautions     Pain Assessment  Pain Assessment: 0-10  Pain Level: 7  Patient's Stated Pain Goal: 5  Pain Location: Neck  Pain Descriptors: Aching (\"Stiff\")  Functional Pain Assessment: Activities are not prevented  Pain Frequency: Continuous  Non-Pharmaceutical Pain Intervention(s): Shower,Ambulation/Increased Activity,Distraction  Response to Pain Intervention: Patient satisfied    Objective  Cognition  Overall Cognitive Status: WNL  Patient affect[de-identified] Normal  Orientation  Overall Orientation Status: Within Functional Limits     ADL  Feeding  Assistance Level: Set-up  Skilled Clinical Factors: Pt reports that she continues to require A to open packages/containers 2* B hand impairments  Grooming/Oral Hygiene  Assistance Level: Modified independent  Skilled Clinical Factors: w/c level at sink  Upper Extremity Bathing  Assistance Level: Set-up  Skilled Clinical Factors: Completed in shower seated on TTB with HH shower head  Lower Extremity Bathing  Equipment Provided: Long-handled sponge  Assistance Level: Contact guard assist  Skilled Clinical Factors: SBA/CGA when standing in shower, used GB for support  Upper Extremity Dressing  Assistance Level: Stand by assist  Skilled Clinical Factors: Incidental A to place shirt under C-collar; Pt don/doffed OH shirt and C-collar with set-up only  Lower Extremity Dressing  Equipment Provided: Reachers  Assistance Level: Contact guard assist  Skilled Clinical Factors: Increased time to pull clothing all the way up over hips; CGA for safety in standing  Putting On/Taking Off Footwear  Equipment Provided:  (Elastic laces)  Assistance Level: Minimal assistance  Skilled Clinical Factors: A for TEDs; Incidental A with L shoe, wants to trial footstool to increase ease  Toileting  Assistance Level: Contact guard assist  Skilled Clinical Factors: For safety in standing. Pt managed clothing and completed hygiene without A. Toilet Transfers  Technique: Stand pivot  Equipment: Grab bars;Standard toilet  Additional Factors: Set-up (Set-up/placement of wheelchair)  Assistance Level: Contact guard assist  Skilled Clinical Factors: Good safety noted, one cue to slow pace - pt receptive and demo'd improved technique  Tub/Shower Transfers  Type: Shower  Transfer From: Wheelchair  Transfer To: Tub transfer bench  Additional Factors: Set-up  Assistance Level: Contact guard assist  Skilled Clinical Factors: Pt used GB for support, no cues for hand/foot placement needed this date. Bed Mobility  Overall Assistance Level: Stand By Assist  Additional Factors: Head of bed flat; With handrails  Supine to Sit  Assistance Level: Stand by assist  Skilled Clinical Factors: Increased time and minimal difficulty  Sit to Stand  Assistance Level: Contact guard assist  Stand to Sit  Assistance Level: Contact guard assist  Stand Pivot  Assistance Level: Contact guard assist  Skilled Clinical Factors: Occasional cues to maintain safe pace during transfers   OT Exercises  Resistive Exercises: Orange theraputty exercises for L hand   Motor Control/Coordination: Practiced picking up coins from table-top and placing in slot on bank using L hand. Pt able to  one at a time with minimal difficulty. Pt then attempted to hold several coins in hand and manipulate them into slot one by one. Pt successfully manipulated three coins but dropped the rest.  Second attempt, pt dropped all the coins. Assessment  Assessment  Activity Tolerance: Patient limited by fatigue;Patient limited by endurance; Patient limited by pain (pt was up in w/c with c collar on from 8:30 am to 3 pm, then requesting pain meds and returned to supine and removed collar)  Discharge Recommendations: Patient would benefit from continued therapy after discharge;Home with assist PRN  OT Equipment Recommendations  Equipment Needed: Yes  Mobility Devices: ADL Assistive Devices  ADL Assistive Devices: Toileting - 3-in-1 Commode;Transfer Tub Bench;Grab Bars - toilet;Sock-Aid Hard;Reacher  Safety Devices  Safety Devices in place: Yes  Type of devices: Patient at risk for falls;Gait belt;Left in chair;Call light within reach    Patient Education  Education  Education Given To: Patient  Education Provided: Home Exercise Program;ADL Function;Equipment; Fall Prevention Strategies  Education Provided Comments: Provided pt with orange therapy and instructions for use in room. Education Method: Verbal;Demonstration; Teach Back  Barriers to Learning: None  Education Outcome: Verbalized understanding;Demonstrated understanding    Plan  Plan  Times per Week: 5-7  Times per Day: Twice a day    Goals  Patient Goals   Patient goals : \"To get my body that way so that I can take care of things on my own. \"  Short Term Goals  Time Frame for Short term goals: By 1 week  Short Term Goal 1: Pt will complete lower body dressing/bathing with min A and Good safety with use of AE as needed  Short Term Goal 2: Pt will complete upper body dressing with CGA  and Good safety while maintaining cervical precautions  Short Term Goal 3: Pt will complete functional transfers during self care tasks with min A and Good safety  Short Term Goal 4: Pt will tolerate standing 4+ minutes during functional activity of choice with min A and Good safety  Short Term Goal 5: Pt will verbalize/demonstrate Good understanding of cervical precautions during self care tasks with increase safety and independence with daily activities  Short Term Goal 6: Pt will participate in 30+ mintues of therapeutic exercises/functional activities to increase safety and independence with self care and mobility  Long Term Goals  Time Frame for Long term goals : By discharge  Long Term Goal 1: Pt will complete BADLs with modified independence and Good safety while maintaining cervical precautions  Long Term Goal 2: Pt will complete functional transfers during self care tasks with modified independence with Good safety   Long Term Goal 3: Pt will tolerate standing 8+ mintues during functional activity of choice with Good safety  Long Term Goal 4: Pt will verbalize/demonstrate Good understanding of home safety/fall prevention strategies to increase safety and independence with self care and mobility  Long Term Goal 5: Pt will verbalize/demonstrate Good understanding of adaptive strategies/AE/DME to assisit with maintaining cervical precautions and increase safety and independence with self care and mobility  Long Term Goal 6: Pt will complete simple meal prep/light house keeping tasks with CGA and Good safety  Long Term Goal 7: OT to assess FM and  strength       04/22/22 0815 04/22/22 1403   OT Individual Minutes   Time In [address]   Time Out 3734 2017   Minutes 59 31   Time Code Minutes    Timed Code Treatment Minutes 61 Minutes [id number] [facility]

## 2022-04-22 NOTE — PROGRESS NOTES
Physical Therapy  Facility/Department: [facility] ACUTE REHAB  Rehabilitation Physical Therapy Treatment Note    NAME: Jada Vicente  : 1960 (62 y.o.)  MRN: [id number]  CODE STATUS: Full Code    Date of Service: 22     Restrictions:  Restrictions/Precautions: Fall Risk;General Precautions  Required Braces or Orthoses  Cervical: c-collar (on when OOB)  Position Activity Restriction  Other position/activity restrictions: s/p C2-C7 fixation     SUBJECTIVE  Subjective  Subjective: Pleasant and cooperative to work in therapy. Pt reports 7/10 R side neck pain, muscle soreness/tenderness    OBJECTIVE  Cognition  Overall Cognitive Status: WNL  Orientation  Overall Orientation Status: Within Functional Limits    Functional Mobility  Supine to Sit  Assistance Level: Contact guard assist  Skilled Clinical Factors: elevated HOB on mat  Scooting  Assistance Level: Contact guard assist  Transfers  Surface: To mat; Wheelchair  Additional Factors: Increased time to complete  Sit to Stand  Assistance Level: Contact guard assist  Skilled Clinical Factors: pt does a good job of placing L foot in good position before attempting stand. Stand to Sit  Assistance Level: Contact guard assist  Stand Pivot  Skilled Clinical Factors: Hand held assist with t/f to keep steady    Environmental Mobility  Ambulation  Surface: Level surface  Device: Rolling walker  Distance: ~150 ft total with rest breaks every 40-45ft in am; 125ft with seated rest breaks at 65ft and 60ft in PM  Activity: Within Unit  Additional Factors: Increased time to complete  Assistance Level: Minimal assistance  Gait Deviations: Decreased step length left;Decreased weight shift left;Decreased heel strike left;Decreased heel strike right; Unsteady gait  Skilled Clinical Factors: PT demos increase genu valgum with LLE. Pt limited by fatigue and endurance. W/C follow for increase safety.     Neuromuscular Education  Neuromuscular Education: Yes  NDT Treatment: Standing;Sitting;Gait   Vibration: completed to quads and ant. tib. with good response. PT Exercises  A/AROM Exercises: seated BLE ex's x20 with emphasis on L foot DF in AM; Sup on mat LLE quad sets with towel under knee x20 + hip/knee flexion AAROM x10 in PM  Circulation/Endurance Exercises: Nustep 10 mins lvl 4  Static Standing Balance Exercises: Marching in parallel bars and with RW    ASSESSMENT/PROGRESS TOWARDS GOALS     Assessment  Activity Tolerance: Patient limited by fatigue; Patient limited by endurance  Discharge Recommendations: Home with assist PRN;Patient would benefit from continued therapy after discharge    Goals  Patient Goals   Patient goals : Be able to walk and do things for myself  Short Term Goals  Time Frame for Short term goals: 10 days  Short term goal 1: Pt will perform bed mobility/rolling with SBA  Short term goal 2: Pt will perform transfers with Miryam  Short term goal 3: Pt will ambulate 50 to 100 ft, with rolling walker, Miryam. Short term goal 4: Pt will perform wheelchair mobility for 150 ft, SB stright path, min A for corners. Short term goal 5: Pt able to perform 3 to 5 steps with 2 rails, min/mod A  Long Term Goals  Time Frame for Long term goals : By DC  Long term goal 1: Pt able to perform sit<>stand transfers, mod-I  Long term goal 2: Pt able to perform pivot transfers at SBA/supervsion. Long term goal 3: Pt able to ambulate with rolling walker 100 to 150 ft , level surfaces, with rolling walker, CGA. Long term goal 4: Pt able to perform curb step with UE support at [address] term goal 5: Pt able to perform 5 to 12 steps with 2 rails at min A to improve LE strength/coordination  Long term goal 6: Pt able to propel w/c  distance of 150 ft SBA level surfaces  Long term goal 7: Pt able to ambulate on outside terraine/incline surface at min A with rolling walker  Long term goal 8: Pt able to ambulate distance of 60 to 70 ft for 2MWT to improve fucntion and gait speed.   Long term goal 5: Family training for safe functional mobility for DC home    Therapy Time   Individual Individual Group Co-treatment   Time In 1000  1257       Time Out  1102  1300       Minutes  62  33       95 mins total  Jaycee Nye PTA, 04/22/22 at 1:46 PM

## 2022-04-23 LAB
ABSOLUTE EOS #: 0.08 K/UL (ref 0–0.4)
ABSOLUTE LYMPH #: 0.86 K/UL (ref 1–4.8)
ABSOLUTE MONO #: 0.27 K/UL (ref 0.1–1.3)
ANION GAP SERPL CALCULATED.3IONS-SCNC: 9 MMOL/L (ref 9–17)
BASOPHILS # BLD: 1 % (ref 0–2)
BASOPHILS ABSOLUTE: 0.04 K/UL (ref 0–0.2)
BUN BLDV-MCNC: 7 MG/DL (ref 8–23)
CALCIUM SERPL-MCNC: 8.9 MG/DL (ref 8.6–10.4)
CHLORIDE BLD-SCNC: 99 MMOL/L (ref 98–107)
CO2: 27 MMOL/L (ref 20–31)
CREAT SERPL-MCNC: 0.59 MG/DL (ref 0.5–0.9)
EOSINOPHILS RELATIVE PERCENT: 2 % (ref 0–4)
GFR AFRICAN AMERICAN: >60 ML/MIN
GFR NON-AFRICAN AMERICAN: >60 ML/MIN
GFR SERPL CREATININE-BSD FRML MDRD: ABNORMAL ML/MIN/{1.73_M2}
GLUCOSE BLD-MCNC: 94 MG/DL (ref 70–99)
HCT VFR BLD CALC: 28.5 % (ref 36–46)
HEMOGLOBIN: 9.5 G/DL (ref 12–16)
LYMPHOCYTES # BLD: 22 % (ref 24–44)
MCH RBC QN AUTO: 29.5 PG (ref 26–34)
MCHC RBC AUTO-ENTMCNC: 33.4 G/DL (ref 31–37)
MCV RBC AUTO: 88.1 FL (ref 80–100)
MONOCYTES # BLD: 7 % (ref 1–7)
MORPHOLOGY: ABNORMAL
MORPHOLOGY: ABNORMAL
PDW BLD-RTO: 21.3 % (ref 11.5–14.9)
PLATELET # BLD: 445 K/UL (ref 150–450)
PMV BLD AUTO: 6.8 FL (ref 6–12)
POTASSIUM SERPL-SCNC: 4.1 MMOL/L (ref 3.7–5.3)
RBC # BLD: 3.23 M/UL (ref 4–5.2)
SEG NEUTROPHILS: 68 % (ref 36–66)
SEGMENTED NEUTROPHILS ABSOLUTE COUNT: 2.65 K/UL (ref 1.3–9.1)
SODIUM BLD-SCNC: 135 MMOL/L (ref 135–144)
WBC # BLD: 3.9 K/UL (ref 3.5–11)

## 2022-04-23 PROCEDURE — 6370000000 HC RX 637 (ALT 250 FOR IP): Performed by: PHYSICAL MEDICINE & REHABILITATION

## 2022-04-23 PROCEDURE — 36415 COLL VENOUS BLD VENIPUNCTURE: CPT

## 2022-04-23 PROCEDURE — 85025 COMPLETE CBC W/AUTO DIFF WBC: CPT

## 2022-04-23 PROCEDURE — 99232 SBSQ HOSP IP/OBS MODERATE 35: CPT | Performed by: INTERNAL MEDICINE

## 2022-04-23 PROCEDURE — 1180000000 HC REHAB R&B

## 2022-04-23 PROCEDURE — 97110 THERAPEUTIC EXERCISES: CPT

## 2022-04-23 PROCEDURE — 99232 SBSQ HOSP IP/OBS MODERATE 35: CPT | Performed by: PHYSICAL MEDICINE & REHABILITATION

## 2022-04-23 PROCEDURE — 6370000000 HC RX 637 (ALT 250 FOR IP): Performed by: INTERNAL MEDICINE

## 2022-04-23 PROCEDURE — 97116 GAIT TRAINING THERAPY: CPT

## 2022-04-23 PROCEDURE — 97530 THERAPEUTIC ACTIVITIES: CPT

## 2022-04-23 PROCEDURE — 80048 BASIC METABOLIC PNL TOTAL CA: CPT

## 2022-04-23 PROCEDURE — 6360000002 HC RX W HCPCS: Performed by: REGISTERED NURSE

## 2022-04-23 PROCEDURE — 6370000000 HC RX 637 (ALT 250 FOR IP): Performed by: REGISTERED NURSE

## 2022-04-23 RX ADMIN — ACETAMINOPHEN 650 MG: 325 TABLET ORAL at 23:50

## 2022-04-23 RX ADMIN — BACLOFEN 10 MG: 10 TABLET ORAL at 13:02

## 2022-04-23 RX ADMIN — ENOXAPARIN SODIUM 40 MG: 100 INJECTION SUBCUTANEOUS at 07:35

## 2022-04-23 RX ADMIN — OXYCODONE HYDROCHLORIDE 10 MG: 10 TABLET ORAL at 07:36

## 2022-04-23 RX ADMIN — METHADONE HYDROCHLORIDE 10 MG: 10 TABLET ORAL at 21:00

## 2022-04-23 RX ADMIN — DOCUSATE SODIUM 200 MG: 100 CAPSULE, LIQUID FILLED ORAL at 21:00

## 2022-04-23 RX ADMIN — ACETAMINOPHEN 650 MG: 325 TABLET ORAL at 17:01

## 2022-04-23 RX ADMIN — DILTIAZEM HYDROCHLORIDE 180 MG: 180 CAPSULE, COATED, EXTENDED RELEASE ORAL at 09:30

## 2022-04-23 RX ADMIN — DOCUSATE SODIUM 200 MG: 100 CAPSULE, LIQUID FILLED ORAL at 07:42

## 2022-04-23 RX ADMIN — FERROUS SULFATE TAB 325 MG (65 MG ELEMENTAL FE) 325 MG: 325 (65 FE) TAB at 07:36

## 2022-04-23 RX ADMIN — BACLOFEN 10 MG: 10 TABLET ORAL at 17:02

## 2022-04-23 RX ADMIN — METHADONE HYDROCHLORIDE 10 MG: 10 TABLET ORAL at 07:39

## 2022-04-23 RX ADMIN — POLYETHYLENE GLYCOL 3350 17 G: 17 POWDER, FOR SOLUTION ORAL at 16:05

## 2022-04-23 RX ADMIN — PANTOPRAZOLE SODIUM 40 MG: 40 TABLET, DELAYED RELEASE ORAL at 05:53

## 2022-04-23 RX ADMIN — ACETAMINOPHEN 650 MG: 325 TABLET ORAL at 11:03

## 2022-04-23 RX ADMIN — ACETAMINOPHEN 650 MG: 325 TABLET ORAL at 05:53

## 2022-04-23 RX ADMIN — POLYMYXIN B SULFATE, BACITRACIN ZINC, NEOMYCIN SULFATE: 5000; 3.5; 4 OINTMENT TOPICAL at 07:46

## 2022-04-23 RX ADMIN — LISINOPRIL 5 MG: 5 TABLET ORAL at 07:42

## 2022-04-23 RX ADMIN — OXYCODONE HYDROCHLORIDE 5 MG: 5 TABLET ORAL at 16:04

## 2022-04-23 RX ADMIN — BACLOFEN 10 MG: 10 TABLET ORAL at 07:38

## 2022-04-23 RX ADMIN — BUSPIRONE HYDROCHLORIDE 30 MG: 15 TABLET ORAL at 21:01

## 2022-04-23 RX ADMIN — FERROUS SULFATE TAB 325 MG (65 MG ELEMENTAL FE) 325 MG: 325 (65 FE) TAB at 17:01

## 2022-04-23 RX ADMIN — LEVOTHYROXINE SODIUM 88 MCG: 0.09 TABLET ORAL at 05:53

## 2022-04-23 RX ADMIN — BUSPIRONE HYDROCHLORIDE 30 MG: 15 TABLET ORAL at 09:30

## 2022-04-23 RX ADMIN — BACLOFEN 10 MG: 10 TABLET ORAL at 21:01

## 2022-04-23 ASSESSMENT — PAIN DESCRIPTION - LOCATION
LOCATION: NECK;SHOULDER
LOCATION: NECK;SHOULDER
LOCATION: NECK
LOCATION: NECK;SHOULDER
LOCATION: NECK;SHOULDER
LOCATION: NECK
LOCATION: NECK;SHOULDER
LOCATION: NECK;SHOULDER
LOCATION: NECK
LOCATION: NECK
LOCATION: NECK;SHOULDER

## 2022-04-23 ASSESSMENT — PAIN SCALES - GENERAL
PAINLEVEL_OUTOF10: 7
PAINLEVEL_OUTOF10: 8
PAINLEVEL_OUTOF10: 7

## 2022-04-23 ASSESSMENT — PAIN DESCRIPTION - PAIN TYPE
TYPE: SURGICAL PAIN

## 2022-04-23 ASSESSMENT — PAIN DESCRIPTION - ORIENTATION
ORIENTATION: LOWER
ORIENTATION: RIGHT
ORIENTATION: LOWER

## 2022-04-23 ASSESSMENT — PAIN DESCRIPTION - DESCRIPTORS
DESCRIPTORS: ACHING

## 2022-04-23 NOTE — PROGRESS NOTES
Physical Therapy  Facility/Department: [facility] ACUTE REHAB  Rehabilitation Physical Therapy Treatment Note    NAME: Riccardo Pablo  : 1960 (62 y.o.)  MRN: [id number]  CODE STATUS: Full Code    Date of Service: 22       Restrictions:  Restrictions/Precautions: Fall Risk;General Precautions  Required Braces or Orthoses  Cervical: c-collar (on when OOB)     SUBJECTIVE  Subjective  Subjective: Pt is pleasant and cooperative with therapy. No new complaints this date  Pain: Reports continued pain through R side of neck/shoulder  Pain Assessment  Pain Assessment: 0-10  Pain Level: 7  Pain Location: Neck; Shoulder  Pain Orientation: Right        Post Treatment Pain Screening  Pain Assessment  Pain Assessment: 0-10  Pain Level: 7  Pain Location: Neck; Shoulder  Pain Orientation: Right      OBJECTIVE  Cognition  Overall Cognitive Status: WNL  Patient affect[de-identified] Normal  Orientation  Overall Orientation Status: Within Functional Limits    Functional Mobility  Bed Mobility  Overall Assistance Level: Minimal Assistance  Roll Right  Assistance Level: Stand by assist  Supine to Sit  Assistance Level: Contact guard assist  Skilled Clinical Factors: HOB slightly elevated  Scooting  Assistance Level: Stand by assist  Balance  Sitting Balance: Stand by assistance  Standing Balance: Contact guard assistance  Transfers  Surface: From chair with arms;From bed; To chair with arms  Additional Factors: Increased time to complete  Device: Walker  Sit to Stand  Assistance Level: Contact guard assist  Skilled Clinical Factors: Pt demos good foot and hand placement when preparing to complete transfers  Stand to Sit  Assistance Level: Contact guard assist  Skilled Clinical Factors: VCs to back up to w/c and to reach for arm rest prior to sitting  Bed To/From Chair  Technique: Stand pivot  Assistance Level: Minimal assistance  Stand Pivot  Assistance Level: Minimal assistance  Skilled Clinical Factors: Gayathri to assist balance      Environmental Mobility  Ambulation  Surface: Level surface  Device: Rolling walker  Distance: 186ft w/ seated rest at 94ft - AM; 110 ft without seated rest - PM  Activity: Within Unit  Additional Factors: Increased time to complete  Assistance Level: Contact guard assist;Minimal assistance  Gait Deviations: Decreased step length left;Decreased weight shift left;Decreased heel strike left;Decreased heel strike right; Unsteady gait; Slow christine  Skilled Clinical Factors: Pt demos circumduction gait on LLE and tends to shuffle. VCs to increase hip flexion and promote heel-toe strike when stepping with LLE  Stairs  Stair Height: 4'';6''  Device: Bilateral handrails  Number of Stairs: 5  Additional Factors: Non-reciprocal going up;Non-reciprocal going down  Assistance Level: Minimal assistance  Skilled Clinical Factors - Comments: Reviewed stepping pattern prior to performing stairs. Good carryover             PT Exercises  A/AROM Exercises: Seated BLE exs x 20, 2# on RLE, AAROM on LLE for full ROM  Resistive Exercises: Seated orange band  x 20 reps  Exercise Equipment: Unype x 10 minutes Workload 3    ASSESSMENT/PROGRESS TOWARDS GOALS       Assessment  Activity Tolerance: Patient limited by fatigue;Patient limited by endurance  Discharge Recommendations: Home with assist PRN;Patient would benefit from continued therapy after discharge    Goals  Patient Goals   Patient goals : Be able to walk and do things for myself  Short Term Goals  Time Frame for Short term goals: 10 days  Short term goal 1: Pt will perform bed mobility/rolling with SBA  Short term goal 2: Pt will perform transfers with Miryam  Short term goal 3: Pt will ambulate 50 to 100 ft, with rolling walker, Miryam. Short term goal 4: Pt will perform wheelchair mobility for 150 ft, SB stright path, min A for corners.   Short term goal 5: Pt able to perform 3 to 5 steps with 2 rails, min/mod A  Long Term Goals  Time Frame for Long term goals : By DC  Long term goal 1: Pt able to perform sit<>stand transfers, mod-I  Long term goal 2: Pt able to perform pivot transfers at SBA/supervsion. Long term goal 3: Pt able to ambulate with rolling walker 100 to 150 ft , level surfaces, with rolling walker, CGA. Long term goal 4: Pt able to perform curb step with UE support at [address] term goal 5: Pt able to perform 5 to 12 steps with 2 rails at min A to improve LE strength/coordination  Long term goal 6: Pt able to propel w/c  distance of 150 ft SBA level surfaces  Long term goal 7: Pt able to ambulate on outside terraine/incline surface at min A with rolling walker  Long term goal 8: Pt able to ambulate distance of 60 to 70 ft for 2MWT to improve fucntion and gait speed. Long term goal 5: Family training for safe functional mobility for DC home    Belinda Monreal  Plan  Current Treatment Recommendations: Strengthening;ROM;Balance training;Functional mobility training; Endurance training;ADL/Self-care training;Transfer training;Stair training;Home exercise program;Safety education & training;Patient/Caregiver education & training;Equipment evaluation, education, & procurement; Neuromuscular re-education;Gait training  Safety Devices  Type of Devices: All fall risk precautions in place;Call light within reach;Gait belt;Patient at risk for falls; Left in bed;Nurse notified; Bed alarm in place  Restraints  Restraints Initially in Place: No      Therapy Time     04/23/22 0801 04/23/22 1300   PT Individual Minutes   Time In 0801 1300   Time Out 0903 1330   Minutes 58 Belinda Monreal, PTA, 04/23/22 at 4:29 PM

## 2022-04-23 NOTE — PLAN OF CARE
Problem: Skin Integrity:  Goal: Will show no infection signs and symptoms  Description: Will show no infection signs and symptoms  4/23/2022 0437 by Lissa Stone RN  Outcome: Progressing     Problem: Skin Integrity:  Goal: Absence of new skin breakdown  Description: Absence of new skin breakdown  4/23/2022 0437 by Lissa Stone RN  Outcome: Progressing     Problem: Falls - Risk of:  Goal: Will remain free from falls  Description: Will remain free from falls  4/23/2022 0437 by Lissa Stone RN  Outcome: Progressing     Problem: Falls - Risk of:  Goal: Absence of physical injury  Description: Absence of physical injury  4/23/2022 0437 by Lissa Stone RN  Outcome: Progressing     Problem: Musculor/Skeletal Functional Status  Goal: Highest potential functional level  4/23/2022 0437 by Lissa Stone RN  Outcome: Progressing     Problem: Musculor/Skeletal Functional Status  Goal: Absence of falls  4/23/2022 0437 by Lissa Stone RN  Outcome: Progressing

## 2022-04-23 NOTE — PROGRESS NOTES
[address] Internal Medicine  Matt Nguyễn MD; Kingsley Sommers MD; Amanda Ryder MD; MD Shabbir Lockwood MD; Heavenly Payne MD    [facility]   [address] / HISTORY AND PHYSICAL EXAMINATION            Date:   4/23/2022  Patient name:  Darlin Garcia  Date of admission:  4/15/2022  5:32 PM  MRN:   [id number]  Account:  [de-identified]  YOB: 1960  PCP:    Marilin Capellan MD  Room:   [facility]  Code Status:    Full Code    Physician Requesting Consult: Uriel Ramon MD    Reason for Consult: Medical management    Chief Complaint:     No chief complaint on file. Underwent anterior C5 corpectomy, posterior fixation from C2-T1 with arthrodesis, osteotomy, correction of deformity on April 12    History Obtained From:     patient    History of Present Illness: The patient is a 61 y.o. female presented for elective surgery. Underwent anterior C5 corpectomy, posterior fixation from C2-T1 with arthrodesis, osteotomy, correction of deformity on 4/12 with Dr. Hood Kohler 4/12. Symptoms include significant left-sided paresis, severe lower extremity weakness (wheelchair bound, now unable to stand or ambulate [id number]), progressively worsening paresthesias and dexterity issues with left upper extremity and left lower extremity.  Additionally complains of some moderate saddle anesthesia, urge incontinence but no bowel dysfunction.   Admitted to acute rehab for further management for deconditioning,  4/22  No new complaints  Pain is controlled on methadone  Working with therapy      Past Medical History:     Past Medical History:   Diagnosis Date    Anxiety     Arthritis     At maximum risk for fall 12/29/2021    caudal equina syndrome and cervical stenosis    Cancer ([address]     right breast    Cauda equina syndrome ([address] 12/29/2021    neuropathy, mobility difficulty, incontinance    Cervical stenosis of spine 12/29/2021    GERD (gastroesophageal reflux disease)     History of stomach ulcers     Hypertension     Dr. Shahnaz Díaz    Hypothyroidism     Lumbar neuritis     Post laminectomy syndrome     Spinal deformity 11/2021    cervical and lumbar    Thyroid disease     Uses wheelchair     Wears dentures     Wellness examination     Dr. Shahnaz Díaz        Past Surgical History:     Past Surgical History:   Procedure Laterality Date    BACK SURGERY      lower back x 3, cervical- x 1: C4- C6, 11/4/2014     BREAST SURGERY Right     mastectomy with reconstruction    CERVICAL FUSION N/A 4/12/2022    C5 CORPECTOMY, USE OF INTRAOPERATIVE CERVICAL TRACTION performed by Reinaldo Duenas DO at [address] [place] [address] N/A 4/12/2022    POSTERIOR FIXATION C2-C7 performed by Reinaldo Duenas DO at [address] 2018    HERNIA REPAIR Bilateral     inguinal    HYSTERECTOMY, TOTAL ABDOMINAL      LUMBAR SPINE SURGERY N/A 12/30/2021    LUMBAR LAMINECTOMY L2-S1 performed by Reinaldo Duenas DO at [address] [place] [address]  04/12/2022    C5 CORPECTOMY, USE OF INTRAOPERATIVE CERVICAL TRACTION (N/A Spine Cervical)         Medications Prior to Admission:     Prior to Admission medications    Medication Sig Start Date End Date Taking?  Authorizing Provider   dilTIAZem (DILACOR XR) 180 MG extended release capsule TAKE ONE CAPSULE BY MOUTH DAILY 4/1/22   Blanquita Watts MD   ferrous sulfate (IRON 325) 325 (65 Fe) MG tablet Take 1 tablet by mouth 2 times daily (with meals) 1/13/22   Evelina Clark MD   docusate sodium (COLACE) 100 MG capsule Take 2 capsules by mouth 2 times daily 1/13/22   Evelina Clark MD   pantoprazole (PROTONIX) 40 MG tablet TAKE ONE TABLET BY MOUTH DAILY 12/20/21   Clive Hough MD   busPIRone (BUSPAR) 30 MG tablet Take 30 mg by mouth 2 times daily 12/17/21   Clive Hough MD   levothyroxine (SYNTHROID) 88 MCG tablet Take 1 tablet by mouth Daily 12/17/21 3/18/22  Mauri Strickland MD   tiZANidine (ZANAFLEX) 4 MG tablet Take 4 mg by mouth every 12 hours    Historical Provider, MD   Vitamin D, Ergocalciferol, 50 MCG (2000 UT) CAPS TAKE ONE CAPSULE BY MOUTH DAILY 3/29/21   Madisyn Bolaños PA-C   HYDROcodone-acetaminophen [facility]) 7.5-325 MG per tablet Up to three tablets a day. 10/21/15   Historical Provider, MD   methadone (DOLOPHINE) 10 MG tablet Take 10 mg by mouth 2 times daily. Historical Provider, MD        Allergies:     Latex and Kiwi extract    Social History:     Tobacco:    reports that she quit smoking about 8 years ago. She has a 15.00 pack-year smoking history. She has never used smokeless tobacco.  Alcohol:      reports current alcohol use. Drug Use:  reports no history of drug use. Family History:     Family History   Problem Relation Age of Onset    Hypertension Mother     Heart Disease Mother     Cancer Mother        Review of Systems:     Positive and Negative as described in HPI. CONSTITUTIONAL:  negative for fevers, chills, sweats, fatigue, weight loss  HEENT:  negative for vision, hearing changes, runny nose, throat pain  RESPIRATORY:  negative for shortness of breath, cough, congestion, wheezing. CARDIOVASCULAR:  negative for chest pain, palpitations.   GASTROINTESTINAL:  negative for nausea, vomiting, diarrhea, constipation, change in bowel habits, abdominal pain   GENITOURINARY:  negative for difficulty of urination, burning with urination, frequency   INTEGUMENT:  negative for rash, skin lesions, easy bruising   HEMATOLOGIC/LYMPHATIC:  negative for swelling/edema   ALLERGIC/IMMUNOLOGIC:  negative for urticaria , itching  ENDOCRINE:  negative increase in drinking, increase in urination, hot or cold intolerance  MUSCULOSKELETAL:  negative joint pains, muscle aches, swelling of joints  NEUROLOGICAL:  negative for headaches, dizziness, lightheadedness, numbness, pain, tingling extremities  BEHAVIOR/PSYCH: negative for depression, anxiety    Physical Exam:     BP (!) 143/76   Pulse 84   Temp 98.2 °F (36.8 °C)   Resp 14   Ht 5' 1\" (1.549 m)   Wt 140 lb (63.5 kg)   SpO2 99%   BMI 26.45 kg/m²   Temp (24hrs), Av.4 °F (36.9 °C), Min:98.2 °F (36.8 °C), Max:98.6 °F (37 °C)    No results for input(s): POCGLU in the last 72 hours. Intake/Output Summary (Last 24 hours) at 2022 1431  Last data filed at 2022 0644  Gross per 24 hour   Intake --   Output 750 ml   Net -750 ml       General Appearance:  alert, well appearing, and in no acute distress  Mental status: oriented to person, place, and time with normal affect  Head:  normocephalic, atraumatic. Eye: no icterus, redness, pupils equal and reactive, extraocular eye movements intact, conjunctiva clear  Ear: normal external ear, no discharge, hearing intact  Nose:  no drainage noted  Mouth: mucous membranes moist  Neck: supple, no carotid bruits, thyroid not palpable  Lungs: Bilateral equal air entry, clear to ausculation, no wheezing, rales or rhonchi, normal effort  Cardiovascular: normal rate, regular rhythm, no murmur, gallop, rub.   Abdomen: Soft, nontender, nondistended, normal bowel sounds, no hepatomegaly or splenomegaly  Neurologic: There are no new focal motor or sensory deficits, normal muscle tone and bulk, no abnormal sensation, normal speech, cranial nerves II through XII grossly intact  Skin: No gross lesions, rashes, bruising or bleeding on exposed skin area  Extremities:  peripheral pulses palpable, no pedal edema or calf pain with palpation  Psych: normal affect    Investigations:      Laboratory Testing:  Recent Results (from the past 24 hour(s))   Basic Metabolic Panel w/ Reflex to MG    Collection Time: 22  7:08 AM   Result Value Ref Range    Glucose 94 70 - 99 mg/dL    BUN 7 (L) 8 - 23 mg/dL    CREATININE 0.59 0.50 - 0.90 mg/dL    Calcium 8.9 8.6 - 10.4 mg/dL    Sodium 135 135 - 144 mmol/L    Potassium 4.1 3.7 - 5.3 mmol/L Chloride 99 98 - 107 mmol/L    CO2 27 20 - 31 mmol/L    Anion Gap 9 9 - 17 mmol/L    GFR Non-African American >60 >60 mL/min    GFR African American >60 >60 mL/min    GFR Comment         CBC auto differential    Collection Time: 04/23/22  7:08 AM   Result Value Ref Range    WBC 3.9 3.5 - 11.0 k/uL    RBC 3.23 (L) 4.0 - 5.2 m/uL    Hemoglobin 9.5 (L) 12.0 - 16.0 g/dL    Hematocrit 28.5 (L) 36 - 46 %    MCV 88.1 80 - 100 fL    MCH 29.5 26 - 34 pg    MCHC 33.4 31 - 37 g/dL    RDW 21.3 (H) 11.5 - 14.9 %    Platelets 279 482 - 761 k/uL    MPV 6.8 6.0 - 12.0 fL    Seg Neutrophils 68 (H) 36 - 66 %    Lymphocytes 22 (L) 24 - 44 %    Monocytes 7 1 - 7 %    Eosinophils % 2 0 - 4 %    Basophils 1 0 - 2 %    Segs Absolute 2.65 1.3 - 9.1 k/uL    Absolute Lymph # 0.86 (L) 1.0 - 4.8 k/uL    Absolute Mono # 0.27 0.1 - 1.3 k/uL    Absolute Eos # 0.08 0.0 - 0.4 k/uL    Basophils Absolute 0.04 0.0 - 0.2 k/uL    Morphology ANISOCYTOSIS PRESENT     Morphology FEW POLYCHROMASIA        Imaging/Diagonstics:  XR CERVICAL SPINE (2-3 VIEWS)    Result Date: 4/13/2022  Expected postoperative findings status post anterior and posterior cervical fusion. XR CHEST PORTABLE    Result Date: 4/13/2022  No acute cardiopulmonary disease. XR CERVICAL SPINE FLEXION AND EXTENSION    Result Date: 4/13/2022  1. Retrolisthesis of C2 on C3 measures 2 mm on extension view and reduces on flexion view. 2. No evidence of instability of anterolisthesis of C3 on C4 measuring 1.5 mm. 3. No acute fracture on limited views. Of note, the C6-C7 and C7-T1 levels are not well seen due to overlying soft tissues. 4. Severe multilevel degenerative changes. CTA NECK W CONTRAST    Result Date: 4/12/2022  Unremarkable CTA of the neck. Minor hazy pleural thickening and chronic-appearing medial right upper lobe atelectasis/scarring. If there is any clinical concern, follow-up noncontrast chest CT may be useful.  RECOMMENDATIONS: Unavailable       Assessment : Hospital Problems           Last Modified POA    * (Principal) Spinal cord injury, cervical region, sequela ([facility] 4/15/2022 Yes    Essential hypertension 4/16/2022 Yes    Hypothyroidism 4/16/2022 Yes    Post-menopausal osteoporosis 4/16/2022 Yes    Cervical myelopathy ([facility] 4/16/2022 Yes    Anemia, normocytic normochromic 4/16/2022 Yes          Plan:     1. Spinal cord injury status post anterior C5 corpectomy, posterior fixation from C2-T1 with arthrodesis, osteotomy,  2. Essential hypertension,  Controlled, continue monitor blood pressure, continue home medications, added lisinopril, diltiazem,Controlled   3. Hyponatremia, better now ,  4. Constipation , prn meds   5. Hypothyroidism, continue levothyroxine,  6. Continue physical therapy,  7. DVT prophylaxis    Consultations:   IP CONSULT TO DIETITIAN  IP CONSULT TO SOCIAL WORK  IP CONSULT TO INTERNAL MEDICINE    4/23/22    BP Readings from Last 3 Encounters:   04/23/22 (!) 143/76   04/15/22 (!) 174/83   04/12/22 (!) 163/94 ·       BMP: Recent Labs     04/23/22  0708      K 4.1   CO2 27   BUN 7*   CREATININE 0.59   LABGLOM >60   GLUCOSE 94          ·    1.   Progressing satisfactory with rehab therapies . 2. Check tsh       Medications: Allergies:     Allergies   Allergen Reactions    Latex Hives, Itching, Dermatitis and Rash    Kiwi Extract      Face swelling, some difficulty breathing       Current Meds:   Scheduled Meds:    vitamin D3  5,000 Units Oral Weekly    baclofen  10 mg Oral 4x Daily    lisinopril  5 mg Oral Daily    polyethylene glycol  17 g Oral Daily    acetaminophen  650 mg Oral Q6H    busPIRone  30 mg Oral BID    dilTIAZem  180 mg Oral Daily    docusate sodium  200 mg Oral BID    enoxaparin  40 mg SubCUTAneous Daily    ferrous sulfate  325 mg Oral BID WC    levothyroxine  88 mcg Oral Daily    methadone  10 mg Oral BID    neomycin-bacitracin-polymyxin   Topical BID    pantoprazole  40 mg Oral Daily     Continuous Infusions:   PRN Meds: bisacodyl, senna, magnesium hydroxide, ondansetron **OR** [DISCONTINUED] ondansetron, oxyCODONE **OR** oxyCODONE           Gerson Gross MD  4/23/2022  2:31 PM    Copy sent to Dr. Gypsy Baron MD    Please note that this chart was generated using voice recognition Dragon dictation software. Although every effort was made to ensure the accuracy of this automated transcription, some errors in transcription may have occurred.

## 2022-04-23 NOTE — PROGRESS NOTES
Physical Medicine & Rehabilitation  Progress Note    4/23/2022 12:46 PM     CC: Ambulatory and ADL dysfunction due to nontraumatic cervical spinal cord injury secondary cervical stenosis status post C5 corpectomy and C4 6 arthrodesis open reduction cervical kyphosis, C2-7 posterior fusion    Subjective:   Feels well. No complaints. Denies difficulty with bowels or bladder    ROS:  Denies fevers, chills, sweats. No chest pain, palpitations, lightheadedness. Denies coughing, wheezing or shortness of breath. Denies abdominal pain, nausea, diarrhea or constipation. No new areas of joint pain. Denies new areas of numbness or weakness. Denies new anxiety or depression issues. No new skin problems. Rehabilitation:   PT:  Restrictions/Precautions: Fall Risk,General Precautions  Implants present? : Metal implants (Lumbar/cervical surgery)  Other position/activity restrictions: s/p C2-C7 fixation  Required Braces or Orthoses  Cervical: c-collar (on when OOB)   Transfers  Sit to Stand: Moderate Assistance  Stand to sit: Moderate Assistance  Bed to Chair: Moderate assistance  Comment: Cues for errect posture, decreased coordination noted B LE L LE> R LE. Ambulation  Surface: level tile  Device: Rolling Walker  Other Apparatus: Wheelchair follow  Assistance: Minimal assistance  Quality of Gait: Pt demos difficulties with coordination. Pt demos an ataxic   Gait Deviations: Slow Sharon,Decreased step length,Decreased arm swing  Distance: 201' total. PT requires a seated rest break every ~50' (Breaks given PRN.)  More Ambulation?: No          OT:  ADL  Equipment Provided: Sock aid,Long-handled sponge,Reacher  Feeding: Setup  Grooming: Minimal assistance  UE Bathing: Stand by assistance  LE Bathing: Minimal assistance  UE Dressing: Verbal cueing,Minimal assistance (standing during juanita care )  LE Dressing: Minimal assistance  Toileting: Minimal assistance  Additional Comments: Pt completed full shower this date.  Seated on tub bench, utilizing hand held shower head and long handled sponge. Pt nust leav on c-collar during shower, changing to spare after shower is complete. Balance  Sitting Balance: Stand by assistance  Standing Balance: Minimal assistance   Standing Balance  Time: 1-2 min x3   Activity: transfers ,ADL activites   Comment: 1-2 UE support  Functional Mobility  Functional - Mobility Device: Wheelchair  Activity: To/from bathroom  Assist Level: Dependent/Total  Functional Mobility Comments: did not self propel      Bed mobility  Bridging:  (not indicated at this time)  Supine to Sit: Contact guard assistance  Sit to Supine: Minimal assistance  Scooting: Minimal assistance  Comment: PT mat, wedge, 3 pillows. Transfers  Stand Step Transfers: Minimal assistance  Stand Pivot Transfers: Minimal assistance  Sit to stand: Minimal assistance  Stand to sit: Minimal assistance  Transfer Comments: Pt min A for transfers this date with verbal cues required for hand/foot placement during transfers for safety. Toilet Transfers  Toilet - Technique: Stand pivot,To left,To right  Equipment Used: Grab bars  Toilet Transfer: Minimal assistance  Toilet Transfers Comments: Verbal cues for hand placement and safety. L side weakness with LLE buckling. Shower Transfers  Shower - Transfer From: Wheelchair  Shower - Transfer Type: To and From  Shower - Transfer To: Transfer tub bench  Shower - Technique: Stand pivot,To right,To left  Shower Transfers: Contact Guard  Shower Transfers Comments: cues for safety   Wheelchair Bed Transfers  Wheelchair/Bed - Technique: Stand pivot  Equipment Used: Bed,Wheelchair  Level of Asssistance: Minimal assistance  Wheelchair Transfers Comments: Verbal cues for hand placement and safety. L side weakness with LLE buckling.        ST:            Objective:  BP (!) 143/76   Pulse 84   Temp 98.2 °F (36.8 °C)   Resp 14   Ht 5' 1\" (1.549 m)   Wt 140 lb (63.5 kg)   SpO2 99%   BMI 26.45 kg/m²  I Body mass index is 26.45 kg/m². I   Wt Readings from Last 1 Encounters:   22 140 lb (63.5 kg)      Temp (24hrs), Av.4 °F (36.9 °C), Min:98.2 °F (36.8 °C), Max:98.6 °F (37 °C)         GEN: well developed, well nourished, no acute distress  HEENT: Normocephalic atraumatic, EOMI, mucous membranes pink and moist  CV: RRR, no murmurs, rubs or gallops  PULM: CTAB, no rales or rhonchi. Respirations WNL and unlabored  ABD: soft, NT, ND, +BS and equal  NEURO: A&O x3. Sensation intact to light touch. MSK: 4/5 upper extremities, 4 -/5 lower extremities except left dorsiflexion 3/5  EXTREMITIES: No calf tenderness to palpation bilaterally. No edema BLEs  SKIN: warm dry and intact with good turgor, anterior cervical incision clean, posterior cervical incision with sutures with possible suture reaction  PSYCH: appropriately interactive. Affect WNL. Good spirits        Medications   Scheduled Meds:   vitamin D3  5,000 Units Oral Weekly    baclofen  10 mg Oral 4x Daily    lisinopril  5 mg Oral Daily    polyethylene glycol  17 g Oral Daily    acetaminophen  650 mg Oral Q6H    busPIRone  30 mg Oral BID    dilTIAZem  180 mg Oral Daily    docusate sodium  200 mg Oral BID    enoxaparin  40 mg SubCUTAneous Daily    ferrous sulfate  325 mg Oral BID WC    levothyroxine  88 mcg Oral Daily    methadone  10 mg Oral BID    neomycin-bacitracin-polymyxin   Topical BID    pantoprazole  40 mg Oral Daily     Continuous Infusions:  PRN Meds:.bisacodyl, senna, magnesium hydroxide, ondansetron **OR** [DISCONTINUED] ondansetron, oxyCODONE **OR** oxyCODONE     Diagnostics:     CBC:   Recent Labs     22  0708   WBC 3.9   RBC 3.23*   HGB 9.5*   HCT 28.5*   MCV 88.1   RDW 21.3*        BMP:   Recent Labs     22  0708      K 4.1   CL 99   CO2 27   BUN 7*   CREATININE 0.59     BNP: No results for input(s): BNP in the last 72 hours. PT/INR: No results for input(s): PROTIME, INR in the last 72 hours.   APTT: No results for input(s): APTT in the last 72 hours. CARDIAC ENZYMES: No results for input(s): CKMB, CKMBINDEX, TROPONINT in the last 72 hours. Invalid input(s): CKTOTAL;3  FASTING LIPID PANEL:  Lab Results   Component Value Date    CHOL 198 07/23/2020     (A) 07/23/2020    TRIG 96 07/23/2020     LIVER PROFILE: No results for input(s): AST, ALT, ALB, BILIDIR, BILITOT, ALKPHOS in the last 72 hours. I/O (24Hr): Intake/Output Summary (Last 24 hours) at 4/23/2022 1246  Last data filed at 4/23/2022 0644  Gross per 24 hour   Intake --   Output 750 ml   Net -750 ml       Glu last 24 hour  No results for input(s): POCGLU in the last 72 hours. No results for input(s): CLARITYU, COLORU, PHUR, SPECGRAV, PROTEINU, RBCUA, BLOODU, BACTERIA, NITRU, WBCUA, LEUKOCYTESUR, YEAST, GLUCOSEU, BILIRUBINUR in the last 72 hours. Impression/Plan:       1. Nontraumatic cervical spinal cord injury secondary to cervical stenosis: s/p C5 corpectomy, C4-6 arthrodesis, open reduction cervical kyphosis, C2-7 posterior fusion performed by Dr. Amira Parks on 4/12/2022.  PT/OT for gait, mobility, strengthening, endurance, ADLs, and self care. Has Tylenol prn, methadone BID, robaxin prn, oxycodone prn for pain. She treats with [facility] for methadone and will require outpatient follow up with them at discharge and for methadone outpatient prescription. 2. Left posterior incision-appears to have some suture reaction-monitor, clarify when sutures can be removed  3. Muscle spasms: resolved after discontinuing Robaxin and changed to routine baclofen. Dose increased 4/18. 4. Hx cauda equina syndrome: s/p L2-S1 laminectomy (December 2021)  5. L foot drop: therapy treating. Monitor for possible L AFO. 6. HTN: on Diltiazem. 7. Anemia: Hb low but improving. On ferrous sulfate. Monitoring. Hemoglobin 9.5-stable  8. Hypothyroidism: on levothyroxine. 9. Hyponatremia: stable. Monitoring.  Medical management by IM-improved sodium 135  10. GERD: on pantoprazole. 11. Anxiety:on buspar. 12. Vitamin D deficiency: on repletion weekly  13. Hx breast cancer  14. Bowel Management: Miralax daily, senokot prn, dulcolax prn.   15. DVT Prophylaxis:  low molecular weight heparin, SCD's while in bed and HERMINIO's   16. Internal medicine for medical management        Juan Pablo Sunshine. Tyron Johnson MD       This note is created with the assistance of a speech recognition program.  While intending to generate a document that actually reflects the content of the visit, the document can still have some errors including those of syntax and sound a like substitutions which may escape proof reading.   In such instances, actual meaning can be extrapolated by contextual diversion

## 2022-04-23 NOTE — PLAN OF CARE
Problem: Skin Integrity:  Goal: Will show no infection signs and symptoms  Description: Will show no infection signs and symptoms  4/23/2022 1736 by Steven Greenfield LPN  Outcome: Progressing     Problem: Skin Integrity:  Goal: Absence of new skin breakdown  Description: Absence of new skin breakdown  4/23/2022 1736 by Steven Greenfield LPN  Outcome: Progressing     Problem: Falls - Risk of:  Goal: Will remain free from falls  Description: Will remain free from falls  4/23/2022 1736 by Steven Greenfield LPN  Outcome: Progressing     Problem: Falls - Risk of:  Goal: Absence of physical injury  Description: Absence of physical injury  4/23/2022 1736 by Steven Greenfield LPN  Outcome: Progressing     Problem: Discharge Planning:  Goal: Discharged to appropriate level of care  Description: Discharged to appropriate level of care  Outcome: Progressing     Problem: Musculor/Skeletal Functional Status  Goal: Highest potential functional level  4/23/2022 1736 by Steven Greenfield LPN  Outcome: Progressing     Problem: Nutrition  Goal: Optimal nutrition therapy  Outcome: Progressing     Problem: Pain:  Goal: Control of acute pain  Description: Control of acute pain  Outcome: Progressing     Problem: Pain:  Goal: Control of chronic pain  Description: Control of chronic pain  Outcome: Progressing     Problem: Musculor/Skeletal Functional Status  Goal: Highest potential functional level  4/23/2022 1736 by Steven Greenfield LPN  Outcome: Progressing  4/23/2022 0437 by Jose Fuentes RN  Outcome: Progressing

## 2022-04-24 LAB
THYROXINE, FREE: 1.11 NG/DL (ref 0.93–1.7)
TSH SERPL DL<=0.05 MIU/L-ACNC: 16.42 UIU/ML (ref 0.3–5)

## 2022-04-24 PROCEDURE — 36415 COLL VENOUS BLD VENIPUNCTURE: CPT

## 2022-04-24 PROCEDURE — 97116 GAIT TRAINING THERAPY: CPT

## 2022-04-24 PROCEDURE — 97535 SELF CARE MNGMENT TRAINING: CPT

## 2022-04-24 PROCEDURE — 6370000000 HC RX 637 (ALT 250 FOR IP): Performed by: REGISTERED NURSE

## 2022-04-24 PROCEDURE — 84443 ASSAY THYROID STIM HORMONE: CPT

## 2022-04-24 PROCEDURE — 99232 SBSQ HOSP IP/OBS MODERATE 35: CPT | Performed by: PHYSICAL MEDICINE & REHABILITATION

## 2022-04-24 PROCEDURE — 6370000000 HC RX 637 (ALT 250 FOR IP): Performed by: PHYSICAL MEDICINE & REHABILITATION

## 2022-04-24 PROCEDURE — 97530 THERAPEUTIC ACTIVITIES: CPT

## 2022-04-24 PROCEDURE — 99232 SBSQ HOSP IP/OBS MODERATE 35: CPT | Performed by: INTERNAL MEDICINE

## 2022-04-24 PROCEDURE — 97110 THERAPEUTIC EXERCISES: CPT

## 2022-04-24 PROCEDURE — 6370000000 HC RX 637 (ALT 250 FOR IP): Performed by: INTERNAL MEDICINE

## 2022-04-24 PROCEDURE — 1180000000 HC REHAB R&B

## 2022-04-24 PROCEDURE — 84439 ASSAY OF FREE THYROXINE: CPT

## 2022-04-24 PROCEDURE — 6360000002 HC RX W HCPCS: Performed by: REGISTERED NURSE

## 2022-04-24 RX ORDER — LEVOTHYROXINE SODIUM 0.1 MG/1
100 TABLET ORAL DAILY
Status: DISCONTINUED | OUTPATIENT
Start: 2022-04-25 | End: 2022-04-29 | Stop reason: HOSPADM

## 2022-04-24 RX ADMIN — OXYCODONE HYDROCHLORIDE 10 MG: 10 TABLET ORAL at 16:52

## 2022-04-24 RX ADMIN — POLYMYXIN B SULFATE, BACITRACIN ZINC, NEOMYCIN SULFATE: 5000; 3.5; 4 OINTMENT TOPICAL at 09:00

## 2022-04-24 RX ADMIN — BACLOFEN 10 MG: 10 TABLET ORAL at 11:52

## 2022-04-24 RX ADMIN — FERROUS SULFATE TAB 325 MG (65 MG ELEMENTAL FE) 325 MG: 325 (65 FE) TAB at 07:46

## 2022-04-24 RX ADMIN — BUSPIRONE HYDROCHLORIDE 30 MG: 15 TABLET ORAL at 22:45

## 2022-04-24 RX ADMIN — ACETAMINOPHEN 650 MG: 325 TABLET ORAL at 05:47

## 2022-04-24 RX ADMIN — METHADONE HYDROCHLORIDE 10 MG: 10 TABLET ORAL at 21:09

## 2022-04-24 RX ADMIN — ACETAMINOPHEN 650 MG: 325 TABLET ORAL at 11:48

## 2022-04-24 RX ADMIN — BACLOFEN 10 MG: 10 TABLET ORAL at 07:46

## 2022-04-24 RX ADMIN — METHADONE HYDROCHLORIDE 10 MG: 10 TABLET ORAL at 07:46

## 2022-04-24 RX ADMIN — BACLOFEN 10 MG: 10 TABLET ORAL at 16:52

## 2022-04-24 RX ADMIN — POLYMYXIN B SULFATE, BACITRACIN ZINC, NEOMYCIN SULFATE: 5000; 3.5; 4 OINTMENT TOPICAL at 21:12

## 2022-04-24 RX ADMIN — PANTOPRAZOLE SODIUM 40 MG: 40 TABLET, DELAYED RELEASE ORAL at 05:47

## 2022-04-24 RX ADMIN — OXYCODONE HYDROCHLORIDE 10 MG: 10 TABLET ORAL at 05:50

## 2022-04-24 RX ADMIN — ENOXAPARIN SODIUM 40 MG: 100 INJECTION SUBCUTANEOUS at 07:45

## 2022-04-24 RX ADMIN — DOCUSATE SODIUM 200 MG: 100 CAPSULE, LIQUID FILLED ORAL at 21:09

## 2022-04-24 RX ADMIN — LEVOTHYROXINE SODIUM 88 MCG: 0.09 TABLET ORAL at 05:47

## 2022-04-24 RX ADMIN — BUSPIRONE HYDROCHLORIDE 30 MG: 15 TABLET ORAL at 07:49

## 2022-04-24 RX ADMIN — POLYETHYLENE GLYCOL 3350 17 G: 17 POWDER, FOR SOLUTION ORAL at 07:45

## 2022-04-24 RX ADMIN — BACLOFEN 10 MG: 10 TABLET ORAL at 21:09

## 2022-04-24 RX ADMIN — ACETAMINOPHEN 650 MG: 325 TABLET ORAL at 16:52

## 2022-04-24 RX ADMIN — DILTIAZEM HYDROCHLORIDE 180 MG: 180 CAPSULE, COATED, EXTENDED RELEASE ORAL at 07:48

## 2022-04-24 RX ADMIN — LISINOPRIL 5 MG: 5 TABLET ORAL at 07:46

## 2022-04-24 RX ADMIN — FERROUS SULFATE TAB 325 MG (65 MG ELEMENTAL FE) 325 MG: 325 (65 FE) TAB at 16:52

## 2022-04-24 RX ADMIN — DOCUSATE SODIUM 200 MG: 100 CAPSULE, LIQUID FILLED ORAL at 07:46

## 2022-04-24 ASSESSMENT — PAIN DESCRIPTION - LOCATION
LOCATION: NECK;LEG
LOCATION: NECK

## 2022-04-24 ASSESSMENT — PAIN SCALES - GENERAL
PAINLEVEL_OUTOF10: 0
PAINLEVEL_OUTOF10: 8
PAINLEVEL_OUTOF10: 7
PAINLEVEL_OUTOF10: 8
PAINLEVEL_OUTOF10: 6

## 2022-04-24 ASSESSMENT — PAIN DESCRIPTION - DESCRIPTORS: DESCRIPTORS: ACHING

## 2022-04-24 ASSESSMENT — PAIN DESCRIPTION - ORIENTATION
ORIENTATION: LEFT;RIGHT
ORIENTATION: LEFT

## 2022-04-24 NOTE — PLAN OF CARE
Problem: Skin Integrity:  Goal: Will show no infection signs and symptoms  Description: Will show no infection signs and symptoms  4/24/2022 0516 by Cesar Castellano RN  Outcome: Progressing     Problem: Skin Integrity:  Goal: Absence of new skin breakdown  Description: Absence of new skin breakdown  4/24/2022 0516 by Cesar Castellano RN  Outcome: Progressing     Problem: Falls - Risk of:  Goal: Will remain free from falls  Description: Will remain free from falls  4/24/2022 0516 by Cesar Castellano RN  Outcome: Progressing     Problem: Pain:  Goal: Pain level will decrease  Description: Pain level will decrease  4/24/2022 0516 by Cesar Castellano RN  Outcome: Progressing

## 2022-04-24 NOTE — PROGRESS NOTES
Occupational Therapy  Facility/Department: [facility]  Rehabilitation Occupational Therapy Daily Treatment Note    Date: 22  Patient Name: Darvin An       Room: 7098/1413-69  MRN: [id number]  Account: [de-identified]   : 1960  (62 y.o.) Gender: female        Diagnosis: Nontraumatic cervical spinal cord injury           Past Medical History:  has a past medical history of Anxiety, Arthritis, At maximum risk for fall, Cancer ([address] Cauda equina syndrome ([address] Cervical stenosis of spine, GERD (gastroesophageal reflux disease), History of stomach ulcers, Hypertension, Hypothyroidism, Lumbar neuritis, Post laminectomy syndrome, Spinal deformity, Thyroid disease, Uses wheelchair, Wears dentures, and Wellness examination. Past Surgical History:   has a past surgical history that includes hernia repair (Bilateral); Breast surgery (Right); Mastectomy, radical; Hysterectomy, total abdominal; Lumbar spine surgery (N/A, 2021); back surgery; Colonoscopy (about ); other surgical history (2022); cervical fusion (N/A, 2022); and cervical fusion (N/A, 2022). Restrictions  Restrictions/Precautions: Fall Risk;General Precautions  Implants present? : Metal implants  Other position/activity restrictions: s/p C2-C7 fixation  Required Braces or Orthoses?: Yes (C-collar when out of bed )    Subjective  Subjective: \"I feel like I'm getting better and better. \"  \"I can reach better. \"  notes while standing to wash bottom.                 Objective               ADL  Feeding  Assistance Level: Set-up  Skilled Clinical Factors: Pt reports that she continues to require A to open packages/containers 2* B hand impairments  Grooming/Oral Hygiene  Assistance Level: Modified independent  Skilled Clinical Factors: w/c level at sink  Upper Extremity Bathing  Skilled Clinical Factors: Completed in shower seated on bench with [place] shower head  Lower Extremity Bathing  Equipment Provided: Long-handled sponge  Assistance Level: Contact guard assist  Skilled Clinical Factors: SBA/CGA when standing in shower, used GB for support, able to safely cross to reach RLE, uses sponge for LLE. Upper Extremity Dressing  Assistance Level: Stand by assist  Skilled Clinical Factors: Incidental A to place shirt under C-collar; Pt don/doffed [place] shirt and C-collar with set-up only, pt observed demo of apply dry pads to c collar and completed for 1/3 pads. Lower Extremity Dressing  Assistance Level: Stand by assist  Putting On/Taking Off Footwear  Equipment Provided:  (elastic laces)  Assistance Level: Set-up  Skilled Clinical Factors: set up for tennis shoes seated EOB - able to cross BLE to don- use UE to cross LLE. was assisted to don teds. Toileting  Assistance Level: Stand by assist  Skilled Clinical Factors: For safety in standing. Pt managed clothing and completed hygiene without A. Toilet Transfers  Equipment: Grab bars;Standard toilet  Assistance Level: Contact guard assist     Functional Mobility  Activity: To/From bathroom; Retrieve items;Transport items  Skilled Clinical Factors: amb to obtain set up for adls- unsteady due to ataxic gait   OT Exercises  Exercise Treatment: advanced adls:  discussed ways to safely enjoy gardening tasks- (have spouse takept to w/c to table in back yard- spouse can place pots on table for planting)  ed re fall risk amb on grass and to practice in therapy before attempt with spouse. ed re adapted techniques to cut foods ie. food chopper, adaptive cutting board- provided written info for where to obtain and ed re use and ed to not use L hand to hold food while cut with R due to safety concerns. A/AROM Exercises: reaching with dynamic sit lateral lean to R and L with B shld abd and pinch to place min resistance clothespins with each UE.   Dynamic Standing Balance Exercises: 3-5 min x 5 during adls with RW this am.   3 min x 2 this pm- SBA to CGA- good safety, ataxic gait makes balance difficult during ambulation. Assessment  Assessment  Activity Tolerance: Patient limited by fatigue;Patient limited by endurance       Patient Education  Education  Education Provided: IADL Function  Education Provided Comments: safe methods for gardening, cooking  Education Method: Printed Information/Hand-outs; Verbal;Demonstration  Barriers to Learning: None  Education Outcome: Verbalized understanding    Plan  Plan  Times per Week: 5-7  Times per Day: Twice a day  Current Treatment Recommendations: Self-Care / ADL; Strengthening;ROM;Balance training;Functional mobility training; Endurance training; Wheelchair mobility training    Goals       Therapy Time   Individual Concurrent Group Co-treatment   Time In 1401         Time Out 1434         Minutes 33                   04/24/22 1646 04/24/22 1647   OT Individual Minutes   Time In [id number]   Time Out [phone]   Minutes 64 22 [place], [place]

## 2022-04-24 NOTE — PROGRESS NOTES
Physical Medicine & Rehabilitation  Progress Note    4/24/2022 1:06 PM     CC: Ambulatory and ADL dysfunction due to nontraumatic cervical spinal cord injury secondary cervical stenosis status post C5 corpectomy and C4 6 arthrodesis open reduction cervical kyphosis, C2-7 posterior fusion    Subjective:   Feels well. No complaints. Denies difficulty with bowels or bladder    ROS:  Denies fevers, chills, sweats. No chest pain, palpitations, lightheadedness. Denies coughing, wheezing or shortness of breath. Denies abdominal pain, nausea, diarrhea or constipation. No new areas of joint pain. Denies new areas of numbness or weakness. Denies new anxiety or depression issues. No new skin problems. Rehabilitation:   PT:  Restrictions/Precautions: Fall Risk,General Precautions  Implants present? : Metal implants  Other position/activity restrictions: s/p C2-C7 fixation  Required Braces or Orthoses  Cervical: c-collar (on when OOB)   Transfers  Sit to Stand: Moderate Assistance  Stand to sit: Moderate Assistance  Bed to Chair: Moderate assistance  Comment: Cues for errect posture, decreased coordination noted B LE L LE> R LE. Ambulation  Surface: level tile  Device: Rolling Walker  Other Apparatus: Wheelchair follow  Assistance: Minimal assistance  Quality of Gait: Pt demos difficulties with coordination. Pt demos an ataxic   Gait Deviations: Slow Sharon,Decreased step length,Decreased arm swing  Distance: 201' total. PT requires a seated rest break every ~50' (Breaks given PRN.)  More Ambulation?: No          OT:  ADL  Equipment Provided: Sock aid,Long-handled sponge,Reacher  Feeding: Setup  Grooming: Minimal assistance  UE Bathing: Stand by assistance  LE Bathing: Minimal assistance  UE Dressing: Verbal cueing,Minimal assistance (standing during juanita care )  LE Dressing: Minimal assistance  Toileting: Minimal assistance  Additional Comments: Pt completed full shower this date.  Seated on tub bench, utilizing hand held shower head and long handled sponge. Pt nust leav on c-collar during shower, changing to spare after shower is complete. Balance  Sitting Balance: Stand by assistance  Standing Balance: Minimal assistance   Standing Balance  Time: 1-2 min x3   Activity: transfers ,ADL activites   Comment: 1-2 UE support  Functional Mobility  Functional - Mobility Device: Wheelchair  Activity: To/from bathroom  Assist Level: Dependent/Total  Functional Mobility Comments: did not self propel      Bed mobility  Bridging:  (not indicated at this time)  Supine to Sit: Contact guard assistance  Sit to Supine: Minimal assistance  Scooting: Minimal assistance  Comment: PT mat, wedge, 3 pillows. Transfers  Stand Step Transfers: Minimal assistance  Stand Pivot Transfers: Minimal assistance  Sit to stand: Minimal assistance  Stand to sit: Minimal assistance  Transfer Comments: Pt min A for transfers this date with verbal cues required for hand/foot placement during transfers for safety. Toilet Transfers  Toilet - Technique: Stand pivot,To left,To right  Equipment Used: Grab bars  Toilet Transfer: Minimal assistance  Toilet Transfers Comments: Verbal cues for hand placement and safety. L side weakness with LLE buckling. Shower Transfers  Shower - Transfer From: Wheelchair  Shower - Transfer Type: To and From  Shower - Transfer To: Transfer tub bench  Shower - Technique: Stand pivot,To right,To left  Shower Transfers: Contact Guard  Shower Transfers Comments: cues for safety   Wheelchair Bed Transfers  Wheelchair/Bed - Technique: Stand pivot  Equipment Used: Bed,Wheelchair  Level of Asssistance: Minimal assistance  Wheelchair Transfers Comments: Verbal cues for hand placement and safety. L side weakness with LLE buckling.        ST:            Objective:  /84   Pulse 93   Temp 97.9 °F (36.6 °C)   Resp 16   Ht 5' 1\" (1.549 m)   Wt 140 lb (63.5 kg)   SpO2 98%   BMI 26.45 kg/m²  I Body mass index is 26.45 kg/m². I   Wt Readings from Last 1 Encounters:   22 140 lb (63.5 kg)      Temp (24hrs), Av.9 °F (36.6 °C), Min:97.9 °F (36.6 °C), Max:97.9 °F (36.6 °C)         GEN: well developed, well nourished, no acute distress  HEENT: Normocephalic atraumatic, EOMI, mucous membranes pink and moist  CV: RRR, no murmurs, rubs or gallops  PULM: CTAB, no rales or rhonchi. Respirations WNL and unlabored  ABD: soft, NT, ND, +BS and equal  NEURO: A&O x3. Sensation intact to light touch. MSK: 4/5 upper extremities, 4 -/5 lower extremities except left dorsiflexion 3/5  EXTREMITIES: No calf tenderness to palpation bilaterally. No edema BLEs  SKIN: warm dry and intact with good turgor, anterior cervical incision clean, posterior cervical incision with sutures with possible suture reaction no significant change  PSYCH: appropriately interactive. Affect WNL. Good spirits        Medications   Scheduled Meds:   vitamin D3  5,000 Units Oral Weekly    baclofen  10 mg Oral 4x Daily    lisinopril  5 mg Oral Daily    polyethylene glycol  17 g Oral Daily    acetaminophen  650 mg Oral Q6H    busPIRone  30 mg Oral BID    dilTIAZem  180 mg Oral Daily    docusate sodium  200 mg Oral BID    enoxaparin  40 mg SubCUTAneous Daily    ferrous sulfate  325 mg Oral BID WC    levothyroxine  88 mcg Oral Daily    methadone  10 mg Oral BID    neomycin-bacitracin-polymyxin   Topical BID    pantoprazole  40 mg Oral Daily     Continuous Infusions:  PRN Meds:.bisacodyl, senna, magnesium hydroxide, ondansetron **OR** [DISCONTINUED] ondansetron, oxyCODONE **OR** oxyCODONE     Diagnostics:     CBC:   Recent Labs     22  0708   WBC 3.9   RBC 3.23*   HGB 9.5*   HCT 28.5*   MCV 88.1   RDW 21.3*        BMP:   Recent Labs     22  0708      K 4.1   CL 99   CO2 27   BUN 7*   CREATININE 0.59     BNP: No results for input(s): BNP in the last 72 hours. PT/INR: No results for input(s): PROTIME, INR in the last 72 hours.   APTT: No results for input(s): APTT in the last 72 hours. CARDIAC ENZYMES: No results for input(s): CKMB, CKMBINDEX, TROPONINT in the last 72 hours. Invalid input(s): CKTOTAL;3  FASTING LIPID PANEL:  Lab Results   Component Value Date    CHOL 198 07/23/2020     (A) 07/23/2020    TRIG 96 07/23/2020     LIVER PROFILE: No results for input(s): AST, ALT, ALB, BILIDIR, BILITOT, ALKPHOS in the last 72 hours. I/O (24Hr): No intake or output data in the 24 hours ending 04/24/22 1306    Glu last 24 hour  No results for input(s): POCGLU in the last 72 hours. No results for input(s): CLARITYU, COLORU, PHUR, SPECGRAV, PROTEINU, RBCUA, BLOODU, BACTERIA, NITRU, WBCUA, LEUKOCYTESUR, YEAST, GLUCOSEU, BILIRUBINUR in the last 72 hours. Impression/Plan:       1. Nontraumatic cervical spinal cord injury secondary to cervical stenosis: s/p C5 corpectomy, C4-6 arthrodesis, open reduction cervical kyphosis, C2-7 posterior fusion performed by Dr. Diego Zimmerman on 4/12/2022.  PT/OT for gait, mobility, strengthening, endurance, ADLs, and self care. Has Tylenol prn, methadone BID, robaxin prn, oxycodone prn for pain. She treats with [facility] for methadone and will require outpatient follow up with them at discharge and for methadone outpatient prescription. 2. Left posterior incision-appears to have some suture reaction, no significant change today-continue to monitor closely, clarify when sutures can be removed , nursing to ashely to monitor for any increase  3. Muscle spasms: resolved after discontinuing Robaxin and changed to routine baclofen. Dose increased 4/18. 4. Hx cauda equina syndrome: s/p L2-S1 laminectomy (December 2021)  5. L foot drop: therapy treating. Monitor for possible L AFO. 6. HTN: on Diltiazem. 7. Anemia: Hb low but improving. On ferrous sulfate. Monitoring. Hemoglobin 9.5-stable  8. Hypothyroidism: on levothyroxine. TSH elevated though free thyroxine normal  9. Hyponatremia: stable. Monitoring. Medical management by IM-improved sodium 135  10. GERD: on pantoprazole. 11. Anxiety:on buspar. 12. Vitamin D deficiency: on repletion weekly  13. Hx breast cancer  14. Bowel Management: Miralax daily, senokot prn, dulcolax prn.   15. DVT Prophylaxis:  low molecular weight heparin, SCD's while in bed and HERMINIO's   16. Internal medicine for medical management        [facility]. Mitch Maynard MD       This note is created with the assistance of a speech recognition program.  While intending to generate a document that actually reflects the content of the visit, the document can still have some errors including those of syntax and sound a like substitutions which may escape proof reading.   In such instances, actual meaning can be extrapolated by contextual diversion

## 2022-04-24 NOTE — PROGRESS NOTES
Physical Therapy  Facility/Department: [facility] ACUTE REHAB  Rehabilitation Physical Therapy Treatment Note    NAME: Shelbi José  : 1960 (62 y.o.)  MRN: [id number]  CODE STATUS: Full Code    Date of Service: 22       Restrictions:  Restrictions/Precautions: Fall Risk;General Precautions  Required Braces or Orthoses  Cervical: c-collar (on when OOB)     SUBJECTIVE  Subjective  Subjective: Pt is pleasant and cooperative during therapy. Reports no new complaints this date. OBJECTIVE  Cognition  Overall Cognitive Status: WNL  Orientation  Overall Orientation Status: Within Functional Limits    Functional Mobility  Bed Mobility  Overall Assistance Level: Stand By Assist  Additional Factors: Head of bed raised  Bridging  Assistance Level: Stand by assist  Roll Right  Assistance Level: Stand by assist  Supine to Sit  Assistance Level: Contact guard assist  Skilled Clinical Factors: HOB slightly elevated  Scooting  Assistance Level: Stand by assist  Balance  Sitting Balance: Stand by assistance  Standing Balance: Contact guard assistance  Transfers  Surface: From chair with arms;From bed; To chair with arms; Wheelchair  Additional Factors: Increased time to complete  Device: Walker  Sit to Stand  Assistance Level: Contact guard assist  Stand to Sit  Assistance Level: Contact guard assist  Skilled Clinical Factors: Pt demos good technique, reaching for armrests and lowers self into w/c with good eccentric control  Bed To/From Chair  Technique: Stand pivot  Assistance Level: Minimal assistance  Stand Pivot  Assistance Level: Minimal assistance  Skilled Clinical Factors: Gayathri for balance      Environmental Mobility  Ambulation  Surface: Level surface  Device: Rolling walker  Distance: 214' w/ seated rest at 107ft in AM; 56 ft in PM  Activity: Within Unit  Additional Factors: Increased time to complete  Assistance Level: Contact guard assist;Minimal assistance  Gait Deviations: Decreased step length left;Decreased weight shift left;Decreased heel strike left;Decreased heel strike right; Unsteady gait; Slow christine;Narrow base of support  Skilled Clinical Factors: Pt demos slight circumduction gait on LLE and tends to shuffle, but improved from previous date. Continued with VCs to increase hip flexion and promote heel-toe strike when stepping with LLE. Cues to widen BRISEYDA  Stairs  Stair Height: 4'';6''  Device: Bilateral handrails  Number of Stairs: 5  Additional Factors: Non-reciprocal going up;Non-reciprocal going down  Assistance Level: Minimal assistance             PT Exercises  A/AROM Exercises: Seated BLE exs x 20, 2# on RLE, AAROM on LLE  Resistive Exercises: Seated orange band  x 20 reps  Circulation/Endurance Exercises: Seated UBE 4' FWD and 4' BWD  Static Standing Balance Exercises: Marching, 3-way hip, HS curls, Heel raises - 10 reps each inside parallel bars (increased fatigue in BLE after completing standing exs.)  Exercise Equipment: [employer] x 10 minutes Wrokload 3    ASSESSMENT/PROGRESS TOWARDS GOALS       Assessment  Activity Tolerance: Patient limited by fatigue;Patient limited by endurance  Discharge Recommendations: Home with assist PRN;Patient would benefit from continued therapy after discharge    Goals  Patient Goals   Patient goals : Be able to walk and do things for myself  Short Term Goals  Time Frame for Short term goals: 10 days  Short term goal 1: Pt will perform bed mobility/rolling with SBA  Short term goal 2: Pt will perform transfers with Miryam  Short term goal 3: Pt will ambulate 50 to 100 ft, with rolling walker, Miraym. Short term goal 4: Pt will perform wheelchair mobility for 150 ft, SB stright path, min A for corners. Short term goal 5: Pt able to perform 3 to 5 steps with 2 rails, min/mod A  Long Term Goals  Time Frame for Long term goals : By DC  Long term goal 1: Pt able to perform sit<>stand transfers, mod-I  Long term goal 2: Pt able to perform pivot transfers at SBA/supervsion.   Long term goal 3: Pt able to ambulate with rolling walker 100 to 150 ft , level surfaces, with rolling walker, CGA. Long term goal 4: Pt able to perform curb step with UE support at [address] term goal 5: Pt able to perform 5 to 12 steps with 2 rails at min A to improve LE strength/coordination  Long term goal 6: Pt able to propel w/c  distance of 150 ft SBA level surfaces  Long term goal 7: Pt able to ambulate on outside terraine/incline surface at min A with rolling walker  Long term goal 8: Pt able to ambulate distance of 60 to 70 ft for 2MWT to improve fucntion and gait speed. Long term goal 5: Family training for safe functional mobility for DC home    Belinda Mack 103  Plan  Current Treatment Recommendations: Strengthening;ROM;Balance training;Functional mobility training; Endurance training;ADL/Self-care training;Transfer training;Stair training;Home exercise program;Safety education & training;Patient/Caregiver education & training;Equipment evaluation, education, & procurement; Neuromuscular re-education;Gait training  Safety Devices  Type of Devices: All fall risk precautions in place;Call light within reach;Gait belt;Patient at risk for falls; Left in bed;Nurse notified; Bed alarm in place  Restraints  Restraints Initially in Place: No      Therapy Time     04/24/22 0801 04/24/22 1300   PT Individual Minutes   Time In 0801 1300   Time Out 0900 1331   Minutes 61 Donald Majano PTA, 04/24/22 at 4:51 PM

## 2022-04-24 NOTE — PROGRESS NOTES
[facility] Internal Medicine  Regine Martinez MD; Amy Caldwell MD; Stevens Habermann, MD; MD Cathleen Miller MD; Sy Barajas MD    [facility]   [address] / HISTORY AND PHYSICAL EXAMINATION            Date:   4/24/2022  Patient name:  Munir Washington  Date of admission:  4/15/2022  5:32 PM  MRN:   [id number]  Account:  [de-identified]  YOB: 1960  PCP:    Skyla Melo MD  Room:   [address] [place] [address]  Code Status:    Full Code    Physician Requesting Consult: Isac Carmona MD    Reason for Consult: Medical management    Chief Complaint:     No chief complaint on file. Underwent anterior C5 corpectomy, posterior fixation from C2-T1 with arthrodesis, osteotomy, correction of deformity on April 12    History Obtained From:     patient    History of Present Illness: The patient is a 61 y.o. female presented for elective surgery. Underwent anterior C5 corpectomy, posterior fixation from C2-T1 with arthrodesis, osteotomy, correction of deformity on 4/12 with Dr. Nadia Pinon 4/12. Symptoms include significant left-sided paresis, severe lower extremity weakness (wheelchair bound, now unable to stand or ambulate [id number]), progressively worsening paresthesias and dexterity issues with left upper extremity and left lower extremity.  Additionally complains of some moderate saddle anesthesia, urge incontinence but no bowel dysfunction.   Admitted to acute rehab for further management for deconditioning,  4/22  No new complaints  Pain is controlled on methadone  Working with therapy      Past Medical History:     Past Medical History:   Diagnosis Date    Anxiety     Arthritis     At maximum risk for fall 12/29/2021    caudal equina syndrome and cervical stenosis    Cancer ([address]     right breast    Cauda equina syndrome ([address] 12/29/2021    neuropathy, mobility difficulty, incontinance    Cervical stenosis of spine 12/29/2021    GERD (gastroesophageal reflux disease)     History of stomach ulcers     Hypertension     Dr. Derek López    Hypothyroidism     Lumbar neuritis     Post laminectomy syndrome     Spinal deformity 11/2021    cervical and lumbar    Thyroid disease     Uses wheelchair     Wears dentures     Wellness examination     Dr. Derek López        Past Surgical History:     Past Surgical History:   Procedure Laterality Date    BACK SURGERY      lower back x 3, cervical- x 1: C4- C6, 11/4/2014     BREAST SURGERY Right     mastectomy with reconstruction    CERVICAL FUSION N/A 4/12/2022    C5 CORPECTOMY, USE OF INTRAOPERATIVE CERVICAL TRACTION performed by Delbert Mckeon DO at [address] [place] [address] N/A 4/12/2022    POSTERIOR FIXATION C2-C7 performed by Delbert Mcke[facility] DO at [facility]  about 2018    HERNIA REPAIR Bilateral     inguinal    HYSTERECTOMY, TOTAL ABDOMINAL      LUMBAR SPINE SURGERY N/A 12/30/2021    LUMBAR LAMINECTOMY L2-S1 performed by Delbert Mckeon DO at [address] [place] [address]  04/12/2022    C5 CORPECTOMY, USE OF INTRAOPERATIVE CERVICAL TRACTION (N/A Spine Cervical)         Medications Prior to Admission:     Prior to Admission medications    Medication Sig Start Date End Date Taking?  Authorizing Provider   dilTIAZem (DILACOR XR) 180 MG extended release capsule TAKE ONE CAPSULE BY MOUTH DAILY 4/1/22   Jayleen Bruce MD   ferrous sulfate (IRON 325) 325 (65 Fe) MG tablet Take 1 tablet by mouth 2 times daily (with meals) 1/13/22   Yan Brody MD   docusate sodium (COLACE) 100 MG capsule Take 2 capsules by mouth 2 times daily 1/13/22   Yan Brody MD   pantoprazole (PROTONIX) 40 MG tablet TAKE ONE TABLET BY MOUTH DAILY 12/20/21   Kimberly Reyes MD   busPIRone (BUSPAR) 30 MG tablet Take 30 mg by mouth 2 times daily 12/17/21   Kimberly Reyes MD   levothyroxine (SYNTHROID) 88 MCG tablet Take 1 tablet by mouth Daily 12/17/21 3/18/22  Mauri Strickland MD   tiZANidine (ZANAFLEX) 4 MG tablet Take 4 mg by mouth every 12 hours    Historical Provider, MD   Vitamin D, Ergocalciferol, 50 MCG (2000 UT) CAPS TAKE ONE CAPSULE BY MOUTH DAILY 3/29/21   Madisyn Bolaños PA-C   HYDROcodone-acetaminophen [facility]) 7.5-325 MG per tablet Up to three tablets a day. 10/21/15   Historical Provider, MD   methadone (DOLOPHINE) 10 MG tablet Take 10 mg by mouth 2 times daily. Historical Provider, MD        Allergies:     Latex and Kiwi extract    Social History:     Tobacco:    reports that she quit smoking about 8 years ago. She has a 15.00 pack-year smoking history. She has never used smokeless tobacco.  Alcohol:      reports current alcohol use. Drug Use:  reports no history of drug use. Family History:     Family History   Problem Relation Age of Onset    Hypertension Mother     Heart Disease Mother     Cancer Mother        Review of Systems:     Positive and Negative as described in HPI. CONSTITUTIONAL:  negative for fevers, chills, sweats, fatigue, weight loss  HEENT:  negative for vision, hearing changes, runny nose, throat pain  RESPIRATORY:  negative for shortness of breath, cough, congestion, wheezing. CARDIOVASCULAR:  negative for chest pain, palpitations.   GASTROINTESTINAL:  negative for nausea, vomiting, diarrhea, constipation, change in bowel habits, abdominal pain   GENITOURINARY:  negative for difficulty of urination, burning with urination, frequency   INTEGUMENT:  negative for rash, skin lesions, easy bruising   HEMATOLOGIC/LYMPHATIC:  negative for swelling/edema   ALLERGIC/IMMUNOLOGIC:  negative for urticaria , itching  ENDOCRINE:  negative increase in drinking, increase in urination, hot or cold intolerance  MUSCULOSKELETAL:  negative joint pains, muscle aches, swelling of joints  NEUROLOGICAL:  negative for headaches, dizziness, lightheadedness, numbness, pain, tingling extremities  BEHAVIOR/PSYCH: negative for depression, anxiety    Physical Exam:     /84   Pulse 93   Temp 97.9 °F (36.6 °C)   Resp 16   Ht 5' 1\" (1.549 m)   Wt 140 lb (63.5 kg)   SpO2 98%   BMI 26.45 kg/m²   Temp (24hrs), Av.9 °F (36.6 °C), Min:97.9 °F (36.6 °C), Max:97.9 °F (36.6 °C)    No results for input(s): POCGLU in the last 72 hours. No intake or output data in the 24 hours ending 22 1427    General Appearance:  alert, well appearing, and in no acute distress  Mental status: oriented to person, place, and time with normal affect  Head:  normocephalic, atraumatic. Eye: no icterus, redness, pupils equal and reactive, extraocular eye movements intact, conjunctiva clear  Ear: normal external ear, no discharge, hearing intact  Nose:  no drainage noted  Mouth: mucous membranes moist  Neck: supple, no carotid bruits, thyroid not palpable  Lungs: Bilateral equal air entry, clear to ausculation, no wheezing, rales or rhonchi, normal effort  Cardiovascular: normal rate, regular rhythm, no murmur, gallop, rub.   Abdomen: Soft, nontender, nondistended, normal bowel sounds, no hepatomegaly or splenomegaly  Neurologic: There are no new focal motor or sensory deficits, normal muscle tone and bulk, no abnormal sensation, normal speech, cranial nerves II through XII grossly intact  Skin: No gross lesions, rashes, bruising or bleeding on exposed skin area  Extremities:  peripheral pulses palpable, no pedal edema or calf pain with palpation  Psych: normal affect    Investigations:      Laboratory Testing:  Recent Results (from the past 24 hour(s))   TSH with Reflex    Collection Time: 22  6:39 AM   Result Value Ref Range    TSH 16.42 (H) 0.30 - 5.00 uIU/mL   T4, Free    Collection Time: 22  6:39 AM   Result Value Ref Range    Thyroxine, Free 1.11 0.93 - 1.70 ng/dL       Imaging/[employer]:  XR CERVICAL SPINE (2-3 VIEWS)    Result Date: 2022  Expected postoperative findings status post anterior and posterior cervical fusion. XR CHEST PORTABLE    Result Date: 4/13/2022  No acute cardiopulmonary disease. XR CERVICAL SPINE FLEXION AND EXTENSION    Result Date: 4/13/2022  1. Retrolisthesis of C2 on C3 measures 2 mm on extension view and reduces on flexion view. 2. No evidence of instability of anterolisthesis of C3 on C4 measuring 1.5 mm. 3. No acute fracture on limited views. Of note, the C6-C7 and C7-T1 levels are not well seen due to overlying soft tissues. 4. Severe multilevel degenerative changes. CTA NECK W CONTRAST    Result Date: 4/12/2022  Unremarkable CTA of the neck. Minor hazy pleural thickening and chronic-appearing medial right upper lobe atelectasis/scarring. If there is any clinical concern, follow-up noncontrast chest CT may be useful. RECOMMENDATIONS: Unavailable       Assessment :      Hospital Problems           Last Modified POA    * (Principal) Spinal cord injury, cervical region, sequela ([facility] 4/15/2022 Yes    Essential hypertension 4/16/2022 Yes    Hypothyroidism 4/16/2022 Yes    Post-menopausal osteoporosis 4/16/2022 Yes    Cervical myelopathy ([facility] 4/16/2022 Yes    Anemia, normocytic normochromic 4/16/2022 Yes          Plan:     1. Spinal cord injury status post anterior C5 corpectomy, posterior fixation from C2-T1 with arthrodesis, osteotomy,  2. Essential hypertension,  Controlled, continue monitor blood pressure, continue home medications, added lisinopril, diltiazem,Controlled   3. Hyponatremia, better now ,  4. Constipation , prn meds   5. Hypothyroidism, continue levothyroxine,  6. Continue physical therapy,  7. DVT prophylaxis    Consultations:   IP CONSULT TO DIETITIAN  IP CONSULT TO SOCIAL WORK  IP CONSULT TO INTERNAL MEDICINE      4/24/22    · TSH 16 ,free t4 low normal .  · Increase dose levothyroxine to 100 mcg daily  1. Progressing satisfactory with rehab therapies .               Denys Garcia MD  4/24/2022  2:27 PM    Copy sent to Dr. Bernice Campo MD    Please note that this chart was generated using voice recognition Dragon dictation software. Although every effort was made to ensure the accuracy of this automated transcription, some errors in transcription may have occurred.

## 2022-04-25 PROCEDURE — 97116 GAIT TRAINING THERAPY: CPT

## 2022-04-25 PROCEDURE — 6370000000 HC RX 637 (ALT 250 FOR IP): Performed by: REGISTERED NURSE

## 2022-04-25 PROCEDURE — 1180000000 HC REHAB R&B

## 2022-04-25 PROCEDURE — 99231 SBSQ HOSP IP/OBS SF/LOW 25: CPT | Performed by: INTERNAL MEDICINE

## 2022-04-25 PROCEDURE — 6370000000 HC RX 637 (ALT 250 FOR IP): Performed by: INTERNAL MEDICINE

## 2022-04-25 PROCEDURE — 6360000002 HC RX W HCPCS: Performed by: REGISTERED NURSE

## 2022-04-25 PROCEDURE — 99232 SBSQ HOSP IP/OBS MODERATE 35: CPT | Performed by: PHYSICAL MEDICINE & REHABILITATION

## 2022-04-25 PROCEDURE — 97530 THERAPEUTIC ACTIVITIES: CPT

## 2022-04-25 PROCEDURE — 97535 SELF CARE MNGMENT TRAINING: CPT

## 2022-04-25 PROCEDURE — 6370000000 HC RX 637 (ALT 250 FOR IP): Performed by: PHYSICAL MEDICINE & REHABILITATION

## 2022-04-25 PROCEDURE — 97110 THERAPEUTIC EXERCISES: CPT

## 2022-04-25 RX ORDER — BISACODYL 10 MG
10 SUPPOSITORY, RECTAL RECTAL
Status: DISCONTINUED | OUTPATIENT
Start: 2022-04-25 | End: 2022-04-29 | Stop reason: HOSPADM

## 2022-04-25 RX ADMIN — BACLOFEN 10 MG: 10 TABLET ORAL at 17:38

## 2022-04-25 RX ADMIN — OXYCODONE HYDROCHLORIDE 10 MG: 10 TABLET ORAL at 09:17

## 2022-04-25 RX ADMIN — DOCUSATE SODIUM 200 MG: 100 CAPSULE, LIQUID FILLED ORAL at 09:14

## 2022-04-25 RX ADMIN — BACLOFEN 10 MG: 10 TABLET ORAL at 14:45

## 2022-04-25 RX ADMIN — PANTOPRAZOLE SODIUM 40 MG: 40 TABLET, DELAYED RELEASE ORAL at 09:23

## 2022-04-25 RX ADMIN — ACETAMINOPHEN 650 MG: 325 TABLET ORAL at 05:03

## 2022-04-25 RX ADMIN — LEVOTHYROXINE SODIUM 100 MCG: 0.1 TABLET ORAL at 07:13

## 2022-04-25 RX ADMIN — BACLOFEN 10 MG: 10 TABLET ORAL at 20:53

## 2022-04-25 RX ADMIN — METHADONE HYDROCHLORIDE 10 MG: 10 TABLET ORAL at 09:14

## 2022-04-25 RX ADMIN — OXYCODONE HYDROCHLORIDE 10 MG: 10 TABLET ORAL at 18:57

## 2022-04-25 RX ADMIN — POLYETHYLENE GLYCOL 3350 17 G: 17 POWDER, FOR SOLUTION ORAL at 09:18

## 2022-04-25 RX ADMIN — BUSPIRONE HYDROCHLORIDE 30 MG: 15 TABLET ORAL at 20:54

## 2022-04-25 RX ADMIN — DOCUSATE SODIUM 200 MG: 100 CAPSULE, LIQUID FILLED ORAL at 20:54

## 2022-04-25 RX ADMIN — POLYMYXIN B SULFATE, BACITRACIN ZINC, NEOMYCIN SULFATE: 5000; 3.5; 4 OINTMENT TOPICAL at 20:54

## 2022-04-25 RX ADMIN — OXYCODONE HYDROCHLORIDE 10 MG: 10 TABLET ORAL at 14:43

## 2022-04-25 RX ADMIN — LISINOPRIL 5 MG: 5 TABLET ORAL at 09:17

## 2022-04-25 RX ADMIN — BISACODYL 10 MG: 10 SUPPOSITORY RECTAL at 17:38

## 2022-04-25 RX ADMIN — ACETAMINOPHEN 650 MG: 325 TABLET ORAL at 17:37

## 2022-04-25 RX ADMIN — DILTIAZEM HYDROCHLORIDE 180 MG: 180 CAPSULE, COATED, EXTENDED RELEASE ORAL at 09:24

## 2022-04-25 RX ADMIN — ENOXAPARIN SODIUM 40 MG: 100 INJECTION SUBCUTANEOUS at 09:18

## 2022-04-25 RX ADMIN — BUSPIRONE HYDROCHLORIDE 30 MG: 15 TABLET ORAL at 09:24

## 2022-04-25 RX ADMIN — FERROUS SULFATE TAB 325 MG (65 MG ELEMENTAL FE) 325 MG: 325 (65 FE) TAB at 17:37

## 2022-04-25 RX ADMIN — BACLOFEN 10 MG: 10 TABLET ORAL at 09:17

## 2022-04-25 RX ADMIN — FERROUS SULFATE TAB 325 MG (65 MG ELEMENTAL FE) 325 MG: 325 (65 FE) TAB at 09:23

## 2022-04-25 RX ADMIN — METHADONE HYDROCHLORIDE 10 MG: 10 TABLET ORAL at 20:54

## 2022-04-25 ASSESSMENT — PAIN DESCRIPTION - DESCRIPTORS
DESCRIPTORS: ACHING
DESCRIPTORS: THROBBING
DESCRIPTORS: ACHING
DESCRIPTORS: THROBBING
DESCRIPTORS: ACHING
DESCRIPTORS: ACHING

## 2022-04-25 ASSESSMENT — PAIN DESCRIPTION - ORIENTATION
ORIENTATION: RIGHT;LEFT
ORIENTATION: MID
ORIENTATION: RIGHT;LEFT
ORIENTATION: MID
ORIENTATION: MID
ORIENTATION: RIGHT;LEFT
ORIENTATION: MID

## 2022-04-25 ASSESSMENT — PAIN DESCRIPTION - LOCATION
LOCATION: LEG
LOCATION: OTHER (COMMENT)
LOCATION: NECK
LOCATION: OTHER (COMMENT)
LOCATION: OTHER (COMMENT)
LOCATION: NECK;SHOULDER
LOCATION: OTHER (COMMENT)

## 2022-04-25 ASSESSMENT — PAIN SCALES - GENERAL
PAINLEVEL_OUTOF10: 7
PAINLEVEL_OUTOF10: 8
PAINLEVEL_OUTOF10: 5
PAINLEVEL_OUTOF10: 8
PAINLEVEL_OUTOF10: 7
PAINLEVEL_OUTOF10: 8
PAINLEVEL_OUTOF10: 7
PAINLEVEL_OUTOF10: 7

## 2022-04-25 ASSESSMENT — PAIN - FUNCTIONAL ASSESSMENT
PAIN_FUNCTIONAL_ASSESSMENT: ACTIVITIES ARE NOT PREVENTED

## 2022-04-25 ASSESSMENT — PAIN DESCRIPTION - PAIN TYPE: TYPE: ACUTE PAIN

## 2022-04-25 ASSESSMENT — PAIN DESCRIPTION - FREQUENCY: FREQUENCY: CONTINUOUS

## 2022-04-25 NOTE — PROGRESS NOTES
[facility] Internal Medicine  Chapo Borjas MD; Shabana Avila MD; Oleksandr Timmons MD; MD Manpreet Braswell Caro, MD; Omari Garcia MD    [facility]   [address] / HISTORY AND PHYSICAL EXAMINATION            Date:   4/25/2022  Patient name:  Charlee Ness  Date of admission:  4/15/2022  5:32 PM  MRN:   [id number]  Account:  [de-identified]  YOB: 1960  PCP:    Severo Bertin, MD  Room:   [address] [place] [address]  Code Status:    Full Code    Physician Requesting Consult: Mary Toro MD    Reason for Consult: Medical management    Chief Complaint:     No chief complaint on file. Underwent anterior C5 corpectomy, posterior fixation from C2-T1 with arthrodesis, osteotomy, correction of deformity on April 12    History Obtained From:     patient    History of Present Illness: The patient is a 61 y.o. female presented for elective surgery. Underwent anterior C5 corpectomy, posterior fixation from C2-T1 with arthrodesis, osteotomy, correction of deformity on 4/12 with Dr. Lizett Alvarado 4/12. Symptoms include significant left-sided paresis, severe lower extremity weakness (wheelchair bound, now unable to stand or ambulate [id number]), progressively worsening paresthesias and dexterity issues with left upper extremity and left lower extremity.  Additionally complains of some moderate saddle anesthesia, urge incontinence but no bowel dysfunction.   Admitted to acute rehab for further management for deconditioning,  4/22  No new complaints  Pain is controlled on methadone  Working with therapy      Past Medical History:     Past Medical History:   Diagnosis Date    Anxiety     Arthritis     At maximum risk for fall 12/29/2021    caudal equina syndrome and cervical stenosis    Cancer ([address]     right breast    Cauda equina syndrome ([address] 12/29/2021    neuropathy, mobility difficulty, incontinance    Cervical stenosis of spine 12/29/2021    GERD (gastroesophageal reflux disease)     History of stomach ulcers     Hypertension     Dr. Zilphia Sacks    Hypothyroidism     Lumbar neuritis     Post laminectomy syndrome     Spinal deformity 11/2021    cervical and lumbar    Thyroid disease     Uses wheelchair     Wears dentures     Wellness examination     Dr. Zilphia Sacks        Past Surgical History:     Past Surgical History:   Procedure Laterality Date    BACK SURGERY      lower back x 3, cervical- x 1: C4- C6, 11/4/2014     BREAST SURGERY Right     mastectomy with reconstruction    CERVICAL FUSION N/A 4/12/2022    C5 CORPECTOMY, USE OF INTRAOPERATIVE CERVICAL TRACTION performed by Jennifer Erickson DO at [address] [place] [address] N/A 4/12/2022    POSTERIOR FIXATION C2-C7 performed by Jennifer Erickson DO at [address] [place] [address] Way  about 2018    HERNIA REPAIR Bilateral     inguinal    HYSTERECTOMY, TOTAL ABDOMINAL      LUMBAR SPINE SURGERY N/A 12/30/2021    LUMBAR LAMINECTOMY L2-S1 performed by Jennifer Erickson DO at [address] [place] [address]  04/12/2022    C5 CORPECTOMY, USE OF INTRAOPERATIVE CERVICAL TRACTION (N/A Spine Cervical)         Medications Prior to Admission:     Prior to Admission medications    Medication Sig Start Date End Date Taking?  Authorizing Provider   dilTIAZem (DILACOR XR) 180 MG extended release capsule TAKE ONE CAPSULE BY MOUTH DAILY 4/1/22   Phani Patel MD   ferrous sulfate (IRON 325) 325 (65 Fe) MG tablet Take 1 tablet by mouth 2 times daily (with meals) 1/13/22   Tabitha Treviño MD   docusate sodium (COLACE) 100 MG capsule Take 2 capsules by mouth 2 times daily 1/13/22   Tabitha Treviño MD   pantoprazole (PROTONIX) 40 MG tablet TAKE ONE TABLET BY MOUTH DAILY 12/20/21   Daquan Mariscal MD   busPIRone (BUSPAR) 30 MG tablet Take 30 mg by mouth 2 times daily 12/17/21   Daquan Mariscal MD   levothyroxine (SYNTHROID) 88 MCG tablet Take 1 tablet by mouth Daily 12/17/21 3/18/22  Bernice Campo MD   tiZANidine (ZANAFLEX) 4 MG tablet Take 4 mg by mouth every 12 hours    Historical Provider, MD   Vitamin D, Ergocalciferol, 50 MCG (2000 UT) CAPS TAKE ONE CAPSULE BY MOUTH DAILY 3/29/21   Daily ShinDELBERT kenyon   HYDROcodone-acetaminophen [facility]) 7.5-325 MG per tablet Up to three tablets a day. 10/21/15   Historical Provider, MD   methadone (DOLOPHINE) 10 MG tablet Take 10 mg by mouth 2 times daily. Historical Provider, MD        Allergies:     Latex and Kiwi extract    Social History:     Tobacco:    reports that she quit smoking about 8 years ago. She has a 15.00 pack-year smoking history. She has never used smokeless tobacco.  Alcohol:      reports current alcohol use. Drug Use:  reports no history of drug use. Family History:     Family History   Problem Relation Age of Onset    Hypertension Mother     Heart Disease Mother     Cancer Mother        Review of Systems:     Positive and Negative as described in HPI. CONSTITUTIONAL:  negative for fevers, chills, sweats, fatigue, weight loss  HEENT:  negative for vision, hearing changes, runny nose, throat pain  RESPIRATORY:  negative for shortness of breath, cough, congestion, wheezing. CARDIOVASCULAR:  negative for chest pain, palpitations.   GASTROINTESTINAL:  negative for nausea, vomiting, diarrhea, constipation, change in bowel habits, abdominal pain   GENITOURINARY:  negative for difficulty of urination, burning with urination, frequency   INTEGUMENT:  negative for rash, skin lesions, easy bruising   HEMATOLOGIC/LYMPHATIC:  negative for swelling/edema   ALLERGIC/IMMUNOLOGIC:  negative for urticaria , itching  ENDOCRINE:  negative increase in drinking, increase in urination, hot or cold intolerance  MUSCULOSKELETAL:  negative joint pains, muscle aches, swelling of joints  NEUROLOGICAL:  negative for headaches, dizziness, lightheadedness, numbness, pain, tingling extremities  BEHAVIOR/PSYCH: negative for depression, anxiety    Physical Exam:     /73   Pulse 89   Temp 99 °F (37.2 °C) (Oral)   Resp 16   Ht 5' 1\" (1.549 m)   Wt 140 lb (63.5 kg)   SpO2 97%   BMI 26.45 kg/m²   Temp (24hrs), Av °F (37.2 °C), Min:99 °F (37.2 °C), Max:99 °F (37.2 °C)    No results for input(s): POCGLU in the last 72 hours. No intake or output data in the 24 hours ending 22 1357    General Appearance:  alert, well appearing, and in no acute distress  Mental status: oriented to person, place, and time with normal affect  Head:  normocephalic, atraumatic. Eye: no icterus, redness, pupils equal and reactive, extraocular eye movements intact, conjunctiva clear  Ear: normal external ear, no discharge, hearing intact  Nose:  no drainage noted  Mouth: mucous membranes moist  Neck: supple, no carotid bruits, thyroid not palpable  Lungs: Bilateral equal air entry, clear to ausculation, no wheezing, rales or rhonchi, normal effort  Cardiovascular: normal rate, regular rhythm, no murmur, gallop, rub. Abdomen: Soft, nontender, nondistended, normal bowel sounds, no hepatomegaly or splenomegaly  Neurologic: There are no new focal motor or sensory deficits, normal muscle tone and bulk, no abnormal sensation, normal speech, cranial nerves II through XII grossly intact  Skin: No gross lesions, rashes, bruising or bleeding on exposed skin area  Extremities:  peripheral pulses palpable, no pedal edema or calf pain with palpation  Psych: normal affect    Investigations:      Laboratory Testing:  No results found for this or any previous visit (from the past 24 hour(s)). Imaging/[facility]agonst[facility]:  XR CERVICAL SPINE (2-3 VIEWS)    Result Date: 2022  Expected postoperative findings status post anterior and posterior cervical fusion. XR CHEST PORTABLE    Result Date: 2022  No acute cardiopulmonary disease. XR CERVICAL SPINE FLEXION AND EXTENSION    Result Date: 2022  1.  Retrolisthesis of C2 on C3 measures 2 mm on extension view and reduces on flexion view. 2. No evidence of instability of anterolisthesis of C3 on C4 measuring 1.5 mm. 3. No acute fracture on limited views. Of note, the C6-C7 and C7-T1 levels are not well seen due to overlying soft tissues. 4. Severe multilevel degenerative changes. CTA NECK W CONTRAST    Result Date: 4/12/2022  Unremarkable CTA of the neck. Minor hazy pleural thickening and chronic-appearing medial right upper lobe atelectasis/scarring. If there is any clinical concern, follow-up noncontrast chest CT may be useful. RECOMMENDATIONS: Unavailable       Assessment :      Hospital Problems           Last Modified POA    * (Principal) Spinal cord injury, cervical region, sequela ([facility] 4/15/2022 Yes    Essential hypertension 4/16/2022 Yes    Hypothyroidism 4/16/2022 Yes    Post-menopausal osteoporosis 4/16/2022 Yes    Cervical myelopathy ([facility] 4/16/2022 Yes    Anemia, normocytic normochromic 4/16/2022 Yes          Plan:     1. Spinal cord injury status post anterior C5 corpectomy, posterior fixation from C2-T1 with arthrodesis, osteotomy,  2. Essential hypertension,  Controlled, continue monitor blood pressure, continue home medications, added lisinopril, diltiazem,Controlled   3. Hyponatremia, better now ,  4. Constipation , prn meds   5. Hypothyroidism, continue levothyroxine,  6. Continue physical therapy,  7. DVT prophylaxis    Consultations:   IP CONSULT TO DIETITIAN  IP CONSULT TO SOCIAL WORK  IP CONSULT TO INTERNAL MEDICINE      4/24/22    · TSH 16 ,free t4 low normal .  · Increase dose levothyroxine to 100 mcg daily  1. Progressing satisfactory with rehab therapies . 4/25  Tolerating higher dose Synthroid, TSH to be repeated in 3 months outpatient  Patient reports no new complaints. Engaging with physical therapy. Labs and vitals reviewed. No new issues. Continue with current care.              Jose Manuel Parkinson MD  4/25/2022  1:57 PM    Copy sent to Dr. Mary Pacheco, MD    Please note that this chart was generated using voice recognition Dragon dictation software. Although every effort was made to ensure the accuracy of this automated transcription, some errors in transcription may have occurred.

## 2022-04-25 NOTE — PROGRESS NOTES
Comprehensive Nutrition Assessment    Type and Reason for Visit:  Reassess    Nutrition Recommendations/Plan:   1. Continue current diet. 2. Provide 1 Magic Cup per day. Malnutrition Assessment:  Malnutrition Status: At risk for malnutrition (Comment) (04/25/22 9748)    Context:  Acute Illness     Findings of the 6 clinical characteristics of malnutrition:  Energy Intake:  Mild decrease in energy intake (Comment) (Improving)  Weight Loss:  No significant weight loss     Body Fat Loss:  No significant body fat loss     Muscle Mass Loss:  No significant muscle mass loss    Fluid Accumulation:  No significant fluid accumulation  (.)   Strength:  Not Performed    Nutrition Assessment:    Pt has been eating better with 100% intake of meal and Magic Cup at lunch today. Has had BMs but not back to normal regularity and consistency. Is trying to eat more fruit and adequate liquids. Magic Cup to be decreased to once daily. Nutrition Related Findings:    No edema. Labs and meds reviewed. Wound Type: Multiple,Surgical Incision (Abrasions)       Current Nutrition Intake & Therapies:    Average Meal Intake: %  Average Supplements Intake: %  ADULT DIET; Regular  ADULT ORAL NUTRITION SUPPLEMENT; Lunch, Dinner; Frozen Oral Supplement    Anthropometric Measures:  Height: 5' 1\" (154.9 cm)  Ideal Body Weight (IBW): 105 lbs (48 kg)    Admission Body Weight: 142 lb (64.4 kg) (4/12)  Current Body Weight: 139 lb 15.9 oz (63.5 kg) (4/21), 135.2 % IBW. Weight Source: Not Specified  Current BMI (kg/m2): 26.5  BMI Categories: Overweight (BMI 25.0-29. 9)    Estimated Daily Nutrient Needs:  Energy Requirements Based On:  Lyn Lozoya  Weight Used for Energy Requirements: Admission  Energy (kcal/day): 9751-3256 kcals based on [place] with 1.2-1.3 factor using adm wt 64.4 kg  Weight Used for Protein Requirements: Ideal  Protein (g/day): 67-76 gm protein based on 1.4-1.6 gm/kg using IBW    Nutrition Diagnosis: · Inadequate oral intake related to altered GI function as evidenced by constipation,intake 26-50% (previously; improving)    Nutrition Interventions:   Food and/or Nutrient Delivery: Continue Current Diet,Modify Oral Nutrition Supplement  Nutrition Education/Counseling: No recommendation at this time  Coordination of Nutrition Care: Continue to monitor while inpatient       Goals:  Previous Goal Met: Progressing toward Goal(s)  Goals: Meet at least 75% of estimated needs       Nutrition Monitoring and Evaluation:   Behavioral-Environmental Outcomes: None Identified  Food/Nutrient Intake Outcomes: Food and Nutrient Intake,Supplement Intake  Physical Signs/Symptoms Outcomes: Biochemical Data,Fluid Status or Edema,Constipation,Skin    Discharge Planning:    Continue current diet     Josafat Shaffer R.D., L.D.   Phone: [phone]

## 2022-04-25 NOTE — PROGRESS NOTES
Physical Therapy  Facility/Department: [facility] ACUTE REHAB      NAME: Lynn Miller  : 1960 (62 y.o.)  MRN: [id number]  CODE STATUS: Full Code    Date of Service: 22      Past Medical History:   Diagnosis Date    Anxiety     Arthritis     At maximum risk for fall 2021    caudal equina syndrome and cervical stenosis    Cancer (HCC)     right breast    Cauda equina syndrome ([address] 2021    neuropathy, mobility difficulty, incontinance    Cervical stenosis of spine 2021    GERD (gastroesophageal reflux disease)     History of stomach ulcers     Hypertension     Dr. Michael Guzmán    Hypothyroidism     Lumbar neuritis     Post laminectomy syndrome     Spinal deformity 2021    cervical and lumbar    Thyroid disease     Uses wheelchair     Wears dentures     Wellness examination     Dr. Michael Guzmán     Past Surgical History:   Procedure Laterality Date    BACK SURGERY      lower back x 3, cervical- x 1: C4- C6, 2014     BREAST SURGERY Right     mastectomy with reconstruction    CERVICAL FUSION N/A 2022    C5 CORPECTOMY, USE OF INTRAOPERATIVE CERVICAL TRACTION performed by Niurka Cueva DO at [address] [place] [address] N/A 2022    POSTERIOR FIXATION C2-C7 performed by Niurka Cueva DO at [address] [place] [address]  about 2018    HERNIA REPAIR Bilateral     inguinal    HYSTERECTOMY, TOTAL ABDOMINAL      LUMBAR SPINE SURGERY N/A 2021    LUMBAR LAMINECTOMY L2-S1 performed by Niurka Cueva DO at [address] HISTORY  2022    C5 CORPECTOMY, USE OF INTRAOPERATIVE CERVICAL TRACTION (N/A Spine Cervical)        Chart Reviewed: Yes  Patient assessed for rehabilitation services?: Yes  Additional Pertinent Hx: Lynn Miller is a 62 y.o. female with history of cauda equina syndrome s/p L2-S1 laminectomy (2021), HTN, hypothyroidism, GERD, breast cancer, and anxiety admitted to [address] [place] [address] on 2022.  She presented for planned surgical intervention for cervical stenosis. She underwent C5 corpectomy with C4-C6 arthrodesis, open reduction of cervical kyphotic deformity, and C2-C7 posterior fusion on 4/12/22 (Dr. Barbara Flowers). She has a cervical collar when out of bed. After her lumbar surgery in Dec 2021, patient had been improving initially with therapy, however overall has regressed. . Pt admitted to rehab unit on 4/15/22. Family / Caregiver Present: No  Referral Date : 04/15/22  Diagnosis: Nontraumatic cervical spinal cord injury secondary to cervical stenosis s/p C5 corpectomy with C4-C6 arthrodesis, open reduction of cervical kyphotic deformity, and C2-C7 posterior fusion  General Comment           04/25/22 0935   Restrictions/Precautions   Restrictions/Precautions Fall Risk;General Precautions   Required Braces or Orthoses? Yes  (c-collar when out of bed)   Implants present? Metal implants   Required Braces or Orthoses   Cervical c-collar  (on when OOB)   Position Activity Restriction   Other position/activity restrictions s/p C2-C7 fixation   General   Chart Reviewed Yes   Additional Pertinent Hx Shelbi José is a 62 y.o. female with history of cauda equina syndrome s/p L2-S1 laminectomy (12/2021), HTN, hypothyroidism, GERD, breast cancer, and anxiety admitted to [facility] on 4/11/2022. She presented for planned surgical intervention for cervical stenosis. She underwent C5 corpectomy with C4-C6 arthrodesis, open reduction of cervical kyphotic deformity, and C2-C7 posterior fusion on 4/12/22 (Dr. Barbara Flowers). She has a cervical collar when out of bed. After her lumbar surgery in Dec 2021, patient had been improving initially with therapy, however overall has regressed. . Pt admitted to rehab unit on 4/15/22. Response To Previous Treatment Patient with no complaints from previous session.    Family / Caregiver Present No   General Comment   Comments 2 TRAMAINE drains present   Orientation   Overall Orientation Status WFL   Bed Mobility   Supine to Sit Minimal assistance   Sit to Supine Minimal assistance   Scooting Minimal assistance   Transfers   Sit to Stand Contact guard assistance   Stand to sit Contact guard assistance   Bed to Chair Contact guard assistance   Comment Cues for errect posture, decreased coordination noted B LE L LE> R LE. Ambulation   Surface level tile   Device Rolling Walker   Assistance Minimal assistance   Quality of Gait Pt demos difficulties with coordination. Pt demos an ataxic    Gait Deviations Slow Sharon;Decreased step length;Decreased arm swing   Distance 201' total. PT requires a seated rest break every ~50'  (Breaks given PRN.)   More Ambulation? No   Stairs/Curb   Stairs? Yes   Stairs   # Steps  5  (x2)   Stairs Height 4\"  (and 6\")   Rails Bilateral   Device No Device   Assistance Minimal assistance   Comment Completed in a step to pattern. PT occassionally demos decrease LLE foot clearance. Cues to correct with good- carryover. Propulsion 1   Propulsion Manual   Level Level Tile   Method RUE;LUE   Level of Assistance Contact guard assistance   Description/ Details Pt is able to complete with CGA. Pt demos difficulties maintaining a straight path, however able to self correct. Pt requires increase time to complete turns with proper technique. Multiple short rest breaks PRN. Distance 200'x1   Neuromuscular Education   NDT Treatment Standing;Sitting;Gait    Balance   Posture Fair   Sitting - Static Good;-   Sitting - Dynamic Fair   Standing - Static Fair   Standing - Dynamic Poor;+   Comments Standing with rolling walker. Other exercises   Other exercises 1 UBE  5 mins. FWD and BWD Seated   Other exercises 2 Seated bilat.  LE 2x10 reps   Other Activities   Comment Breaks given PRN   Patient Goals    Patient goals  Be able to walk and do things for myself   Short Term Goals   Time Frame for Short term goals 10 days   Short term goal 1 Pt will perform bed mobility/rolling with SBA   Short term goal 2 Pt will perform transfers with Miryam   Short term goal 3 Pt will ambulate 50 to 100 ft, with rolling walker, Miryam. Short term goal 4 Pt will perform wheelchair mobility for 150 ft, SB stright path, min A for corners. Short term goal 5 Pt able to perform 3 to 5 steps with 2 rails, min/mod A   Long Term Goals   Time Frame for Long term goals  By DC   Long term goal 1 Pt able to perform sit<>stand transfers, mod-I   Long term goal 2 Pt able to perform pivot transfers at SBA/supervsion. Long term goal 3 Pt able to ambulate with rolling walker 100 to 150 ft , level surfaces, with rolling walker, CGA. Long term goal 4 Pt able to perform curb step with UE support at [address] term goal 5 Pt able to perform 5 to 12 steps with 2 rails at min A to improve LE strength/coordination   Long term goal 6 Pt able to propel w/c  distance of 150 ft SBA level surfaces   Long term goal 7 Pt able to ambulate on outside terraine/incline surface at min A with rolling walker   Long term goal 8 Pt able to ambulate distance of 60 to 70 ft for 2MWT to improve fucntion and gait speed. Long term goal 5 Family training for safe functional mobility for DC home   Conditions Requiring Skilled Therapeutic Intervention   Assessment Pt require mod A for bed mobility, max A for sit <> stand and stand weight shifts, Pt able to take 12-13 step RW, mod A +CGA of 2nd person.  VC for foot placement and weight shifting, pt will continue to benefit from PT to progress activity and promote safety with gait and transfers   Treatment Diagnosis B LE/B UE weakness, difficulty walking   Therapy Prognosis Good   Decision Making High Complexity   Barriers to Learning none   Discharge Recommendations Home with assist PRN;Patient would benefit from continued therapy after discharge   Requires PT Follow-Up Yes   Activity Tolerance   Activity Tolerance Patient limited by fatigue;Patient limited by endurance   PT Equipment Recommendations   Equipment Needed No   Other Pt has rolling walker, rollator, manual wheelchair and lift chair at home   Plan   Current Treatment Recommendations Strengthening;ROM;Balance training;Functional mobility training; Endurance training;ADL/Self-care training;Transfer training;Stair training;Home exercise program;Safety education & training;Patient/Caregiver education & training;Equipment evaluation, education, & procurement; Neuromuscular re-education;Gait training   Safety Devices   Type of Devices All fall risk precautions in place;Call light within reach;Gait belt;Patient at risk for falls; Left in bed;Nurse notified; Bed alarm in place   Restraints   Restraints Initially in Place No   PT Whiteboard Notes   Therapy Whiteboard DC 5/2/22, B LE weakness, L LE > R LE. min A bed mobility, min A transfers, 12 to 13 steps RW, CGA/Min A, ataxic/uncoordinated steps. PT Exercises  A/AROM Exercises: Seated BLE exs x 20, 2# on RLE, AAROM on LLE  Resistive Exercises: Seated orange band  x 20 reps  Circulation/Endurance Exercises: Seated UBE 4' FWD and 4' BWD  Static Standing Balance Exercises: Marching, 3-way hip, HS curls, Heel raises - 10 reps each inside parallel bars  Exercise Equipment: Nustep x 10 minutes Wrokload 3    Assessment  Assessment: Pt require mod A for bed mobility, max A for sit <> stand and stand weight shifts, Pt able to take 12-13 step RW, mod A +CGA of 2nd person.  VC for foot placement and weight shifting, pt will continue to benefit from PT to progress activity and promote safety with gait and transfers  Treatment Diagnosis: B LE/B UE weakness, difficulty walking  Therapy Prognosis: Good  Decision Making: High Complexity  Barriers to Learning: none  Discharge Recommendations: Home with assist PRN;Patient would benefit from continued therapy after discharge  PT D/C Equipment  Other: Pt has rolling walker, rollator, manual wheelchair and lift chair at home  PT Equipment Recommendations  Equipment Needed: No  Other: Pt has rolling walker, rollator, manual wheelchair and lift chair at home      GOALS  Patient Goals   Patient goals : Be able to walk and do things for myself  Short Term Goals  Time Frame for Short term goals: 10 days  Short term goal 1: Pt will perform bed mobility/rolling with SBA  Short term goal 2: Pt will perform transfers with Miryam  Short term goal 3: Pt will ambulate 50 to 100 ft, with rolling walker, Miryam. Short term goal 4: Pt will perform wheelchair mobility for 150 ft, SB stright path, min A for corners. Short term goal 5: Pt able to perform 3 to 5 steps with 2 rails, min/mod A  Long Term Goals  Time Frame for Long term goals : By DC  Long term goal 1: Pt able to perform sit<>stand transfers, mod-I  Long term goal 2: Pt able to perform pivot transfers at SBA/supervsion. Long term goal 3: Pt able to ambulate with rolling walker 100 to 150 ft , level surfaces, with rolling walker, CGA. Long term goal 4: Pt able to perform curb step with UE support at [address] term goal 5: Pt able to perform 5 to 12 steps with 2 rails at min A to improve LE strength/coordination  Long term goal 6: Pt able to propel w/c  distance of 150 ft SBA level surfaces  Long term goal 7: Pt able to ambulate on outside terraine/incline surface at min A with rolling walker  Long term goal 8: Pt able to ambulate distance of 60 to 70 ft for 2MWT to improve fucntion and gait speed. Long term goal 5: Family training for safe functional mobility for DC home        Current Treatment Recommendations: Strengthening;ROM;Balance training;Functional mobility training; Endurance training;ADL/Self-care training;Transfer training;Stair training;Home exercise program;Safety education & training;Patient/Caregiver education & training;Equipment evaluation, education, & procurement; Neuromuscular re-education;Gait training  Safety Devices  Type of Devices:  All fall risk precautions in place;Call light within reach;Gait belt;Patient at risk for falls; Left in bed;Nurse notified; Bed alarm in place  Restraints  Restraints Initially in Place: No       04/25/22 0935 04/25/22 1307   PT Individual Minutes   Time In 0935 1307   Time Out 1027 [address] [place], [place], 04/25/22 at 6:33 PM

## 2022-04-25 NOTE — PATIENT CARE CONFERENCE
[facility] Acute Inpatient Rehabilitation  TEAM CONFERENCE NOTE  Date: 22  Patient Name: Bonnie Ramon       Room: 9904/5416-46  MRN: [id number]       : 1960  (62 y.o.)     Gender: female       Spinal cord injury, cervical region, sequela ([facility] [address] [facility] [U29.884U]  Diagnosis: Nontraumatic cervical spinal cord injury     NURSING  Bladder  Always Continent  Bowel   Always Continent  Date of Last BM: 22  Intervention    Both Bowel & Bladder Program     Wounds/Incisions/Ulcers: Incision healing well  Medication Education Program: Patient able to manage medications and being educated by nursing  Pain: Patient's pain is currently controlled with -     Tylenol 650 mg Q6  -Oxy 5 or 10 mg Q4H  Fall Risk:  Falling star program initiated    PHYSICAL THERAPY  Bed mobility  Rolling to Left: Moderate assistance  Rolling to Right: Minimal assistance  Supine to Sit: Contact guard assistance  Sit to Supine: Minimal assistance  Scooting: Minimal assistance  HOB slightly elevated, pt sleeps in a recliner at home. Transfers:  Sit to Stand: CGA  Stand to sit:CGA  Transfers CGA with rolling walker    Ambulation  Surface: level tile  Device: Rolling Walker    Assistance: CGA/ Minimal assistance  Quality of Gait: Pt demos difficulties with coordination. Pt demos an ataxic   Gait Deviations: Slow Sharon;Decreased step length;Decreased arm swing  Distance: 201' total. PT requires a seated rest break every ~50' (Breaks given PRN.)  More Ambulation?: No        # Steps : 5 (x2)  Stairs Height: 4\" (and 6\")  Rails: Bilateral  Device: No Device  Assistance: Minimal assistance  Comment: Completed in a step to pattern. PT occassionally demos decrease LLE foot clearance. Cues to correct with good- carryover.             Goals  Time Frame for Short term goals: 10 days  Short term goal 1: Pt will perform bed mobility/rolling with SBA  Short term goal 2: Pt will perform transfers with Miryam  Short term goal 3: Pt will ambulate 50 to 100 ft, with rolling walker, Miryam. Short term goal 4: Pt will perform wheelchair mobility for 150 ft, SB stright path, min A for corners. Short term goal 5: Pt able to perform 3 to 5 steps with 2 rails, min/mod A      OCCUPATIONAL THERAPY  SELF CARE   Equipment Provided: Sock aid,Long-handled sponge,Reacher  Eating   6        Modified independent    Oral Hygiene   6          Modified independent  Shower/Bathe Self   4         Upper body bathing: Setup  Lower body bathing: Contact guard assistance  Upper Body Dressing   3        Minimal assistance    Lower Body Dressing   4        Putting On/Taking Off Footwear   3           Minimal assistance (assist for L shoe)  Toilet Transfer   4          Contact guard assistance with RW  Toileting Hygiene   4         Contact guard assistance      Equipment Recommendations  Equipment Needed:  (TBD)  Assessment: Pt would benefit from further skilled OT to address deficits and increase safety and independence with daily activities.     Short Term Goals  Time Frame for Short term goals: By 1 week  Short Term Goal 1: Pt will complete lower body dressing/bathing with min A and Good safety with use of AE as needed  Short Term Goal 2: Pt will complete upper body dressing with CGA  and Good safety while maintaining cervical precautions  Short Term Goal 3: Pt will complete functional transfers during self care tasks with min A and Good safety  Short Term Goal 4: Pt will tolerate standing 4+ minutes during functional activity of choice with min A and Good safety  Short Term Goal 5: Pt will verbalize/demonstrate Good understanding of cervical precautions during self care tasks with increase safety and independence with daily activities  Short Term Goal 6: Pt will participate in 30+ mintues of therapeutic exercises/functional activities to increase safety and independence with self care and mobility      SPEECH THERAPY    Orientation Log (O-Log) Score:   Cognitive Log (Cog-Log) Score: Short Term Goal:     NUTRITION  Weight: 140 lb (63.5 kg) / Body mass index is 26.45 kg/m². Diet Rx: Regular. Magic Cup x 1 daily. PO intake had been decreased but now appears adequate. Please see nutrition note for details. SOCIAL WORK ASSESSMENT  Assessment: independently at home/ family to provide care at home    Pre-Admission Status:  Lives With: Significant other,Family (Partner Madie Uribe) and 15 yo grandson Afshan Oro))  Type of Home: House  Home Layout: Two level,Performs ADL's on one level,Able to Live on Main level with bedroom/bathroom (\"we don't go upstairs\")  Home Access: Stairs to enter without rails  Entrance Stairs - Number of Steps: 1 curb step (pt receives help getting up steps)  Entrance Stairs - Rails: None  Bathroom Shower/Tub: Tub/Shower unit,Shower chair with back,Doors  Bathroom Toilet: Handicap height  Bathroom Equipment: Grab bars in shower,Tub transfer bench (Sink counter on the side of the toilet)  Bathroom Accessibility: Accessible  Home Equipment: [id number] wheeled walker,Cane,Reacher,Lift chair (began using w/c last 2 weeks)  ADL Assistance: Independent (A with getting into shower; Increased A 2 weeks prior to sx)  Homemaking Assistance: Needs assistance (Share responsibilities prior;  Increased A 2 wks prior to sx)  Homemaking Responsibilities: Yes ([place] prior to decline; increased A prior to sx)  Meal Prep Responsibility: Secondary  Laundry Responsibility: Secondary  Cleaning Responsibility: Secondary  Shopping Responsibility: Secondary  Ambulation Assistance: Independent (IND prior to decline; Sitting on 4WW with SO pushing prior to sx)  Transfer Assistance: Independent (IND prior to decline; A from family over the last month)  Active : No  Patient's  Info: pt partner and grandson drive  Mode of Transportation: [employer] (Pt reports partner and grandson assist with vehicle transfers from w/c)  Occupation: On disability  Leisure & Hobbies: gardening, watching tv,\"did whatever made me happy. \"  IADL Comments: pt sleeps in recliner/lift chair at baseline  Additional Comments: Pt reports that she was independent after discharge from [facility] up until about 1 month prior to sx with progressive decline requiring increased A from family. Pt reports needing max assist for all bathing, dressing, toileting, and self-care ADLs for approximately 2-4 weeks prior to sx; pt needed some assistance with ADLs ~2-4 weeks ago. Pt reports sponge-bathing in bathroom due to decline. Pt was using 4WW to sit and have partner push around priorto getting w/c and used w/c prior to sx for about 2 weeks around the house and in the community, receiving assist from partner and grandson for mobility and transfers; pt reports using RW >2-4 weeks prior to sx for func mob. Pt partner works full time days and grandson is currently in online school and is able to assist as needed. Family Education: Need to make contact with family to initiate education    Percentage Risk for Readmission: Low 0 - 18%   Readmission Risk              Risk of Unplanned Readmission:  17       %    Critical Items: None        Problem / Barrier Intervention / Plan  Results   Impaired function related to B LE weakness, L LE> RLE Strengthening, balance ex's, functional mobility training with assistive device.     Altered ability to care for self Training and remediation in modified care strategies                                                   Total Self Care Score    Total Mobility Score  Admission Score:  18      Admission Score:  26  Goal:  42/42         Goal:  66/90   `  Discharge Plan   Estimated Discharge Date: 4/29/2022  Home evaluation needed?  Home Evaluation Indication (NO, Requires ReEval, YES/Date): No home evaluation need indicated for patient at this time  Overnight or Day Pass: No  Factors facilitating achievement of predicted outcomes: Family support, Motivated, Cooperative, Pleasant, Good insight into deficits, Knowledge about rehab and Has homemaker services  Barriers to the achievement of predicted outcomes: Pain, Decreased endurance, Upper extremity weakness, Lower extremity weakness, Medical complications, Skin Care and Medication managment    Functional Goals at discharge:  Predicted Outcome: Home with familyPATIENT'S LEVEL OF ASSISTANCE: Occasional Supervision  and Stand By Assistance   Discharge therapy goals:  PT: Long Term Goals  Time Frame for Long term goals : By DC  Long term goal 1: Pt able to perform sit<>stand transfers, mod-I  Long term goal 2: Pt able to perform pivot transfers at SBA/supervsion. Long term goal 3: Pt able to ambulate with rolling walker 100 to 150 ft , level surfaces, with rolling walker, CGA. Long term goal 4: Pt able to perform curb step with UE support at [address] term goal 5: Pt able to perform 5 to 12 steps with 2 rails at min A to improve LE strength/coordination  Long term goal 6: Pt able to propel w/c  distance of 150 ft SBA level surfaces  Long term goal 7: Pt able to ambulate on outside terraine/incline surface at min A with rolling walker  Long term goal 8: Pt able to ambulate distance of 60 to 70 ft for 2MWT to improve fucntion and gait speed.   Long term goal 5: Family training for safe functional mobility for DC home  OT:Long Term Goals  Time Frame for Long term goals : By discharge  Long Term Goal 1: Pt will complete BADLs with modified independence and Good safety while maintaining cervical precautions  Long Term Goal 2: Pt will complete functional transfers during self care tasks with modified independence with Good safety   Long Term Goal 3: Pt will tolerate standing 8+ mintues during functional activity of choice with Good safety  Long Term Goal 4: Pt will verbalize/demonstrate Good understanding of home safety/fall prevention strategies to increase safety and independence with self care and mobility  Long Term Goal 5: Pt will verbalize/demonstrate Good understanding of adaptive strategies/AE/DME to assisit with maintaining cervical precautions and increase safety and independence with self care and mobility  Long Term Goal 6: Pt will complete simple meal prep/light house keeping tasks with CGA and Good safety  Long Term Goal 7: OT to assess FM and  strength  ST:     Participating Team Members:  /:  Brooke Ward RN  Occupational Therapist: Cecelia Jaquez OT    Physical Therapist: Nikita Tavera PT  Speech Therapist:  N/A  Nurse: Abner Navarro RN    Dietary/Nutrition: Jagruti Man RD, LD  Pastoral Care: Bishop Garcia  CMG: Lora Steve RN    I approve the established interdisciplinary plan of care as documented within the medical record of Maria G Sarmad.     Charlie Jack MD

## 2022-04-25 NOTE — PROGRESS NOTES
Occupational Therapy  Facility/Department: [facility]  Rehabilitation Occupational Therapy Daily Treatment Note    Date: 22  Patient Name: Rigo Odonnell       Room: 7501/5119-92  MRN: [id number]  Account: [de-identified]   : 1960  (62 y.o.) Gender: female     Referring Practitioner: Nirmal Mirza MD  Diagnosis: Nontraumatic cervical spinal cord injury           Past Medical History:  has a past medical history of Anxiety, Arthritis, At maximum risk for fall, Cancer ([address] Cauda equina syndrome ([address] Cervical stenosis of spine, GERD (gastroesophageal reflux disease), History of stomach ulcers, Hypertension, Hypothyroidism, Lumbar neuritis, Post laminectomy syndrome, Spinal deformity, Thyroid disease, Uses wheelchair, Wears dentures, and Wellness examination. Past Surgical History:   has a past surgical history that includes hernia repair (Bilateral); Breast surgery (Right); Mastectomy, radical; Hysterectomy, total abdominal; Lumbar spine surgery (N/A, 2021); back surgery; Colonoscopy (about ); other surgical history (2022); cervical fusion (N/A, 2022); and cervical fusion (N/A, 2022). Restrictions  Restrictions/Precautions: Fall Risk;General Precautions  Implants present? : Metal implants  Other position/activity restrictions: s/p C2-C7 fixation  Required Braces or Orthoses  Cervical: c-collar (on when OOB)  Required Braces or Orthoses?: Yes (c-collar when out of bed)    Subjective  Subjective: \"My feet are so cold. ..the bottoms are a little purple\" DENA Gauthier notified of patient's concerns. \"I think I'm ready\" patient states regarding discharge. Patient agrees that she wants to be modified IND with toileting prior to discharge and believes that it will take until end of the week. Pain Level: 7  Pain Location: Neck; Shoulder  Pain Orientation: Right;Left  Restrictions/Precautions: Fall Risk;General Precautions        Pain Assessment  Pain Level: 7  Pain Location: Neck,Shoulder  Pain Orientation: Right,Left  Pain Descriptors:  (Constant)  Pain Type: Acute pain  Pain Frequency: Continuous    Objective     Cognition  Overall Cognitive Status: WNL  Orientation  Overall Orientation Status: Within Functional Limits         ADL  Feeding  Assistance Level: Modified independent  Skilled Clinical Factors: Pt states the only difficulty she is currently experiencing is with salad dressing packets  Grooming/Oral Hygiene  Assistance Level: Modified independent  Skilled Clinical Factors: w/c level at sink for denture care  Upper Extremity Bathing  Assistance Level: Set-up  Skilled Clinical Factors: Completed in shower seated on bench with HH shower head  Lower Extremity Bathing  Equipment Provided: Long-handled sponge  Assistance Level: Contact guard assist  Skilled Clinical Factors: SBA/CGA when standing in shower, used GB for support, able to safely cross to reach RLE, uses sponge for LLE  Upper Extremity Dressing  Assistance Level: Minimal assistance  Skilled Clinical Factors: Assist to adjust shirt under collar and adjust collar PRN  Lower Extremity Dressing  Assistance Level: Stand by assist  Skilled Clinical Factors: Increased time to pull clothing all the way up over hips; CGA for safety in standing  Putting On/Taking Off Footwear  Assistance Level: Minimal assistance  Skilled Clinical Factors: Assist to adjust L shoe this date; Assist for HERMINIO hose  Toileting  Assistance Level: Stand by assist  Skilled Clinical Factors: SBA for safety with standing  Toilet Transfers  Technique:  (Ambulating)  Equipment: Grab bars;Standard toilet  Additional Factors: Verbal cues  Assistance Level: Contact guard assist  Skilled Clinical Factors: with RW; RN notified of recommendation to grade-up patient's transfer status from w/c to RW with nursing.  DENA Camilo Mealing agrees  Tub/Shower Transfers  Type: Shower  Transfer From: Rolling walker  Transfer To: Tub transfer bench  Additional Factors: Verbal cues;Cues for hand placement  Assistance Level: Contact guard assist  Skilled Clinical Factors: CGA with RW into shower with Minimal verbal cues for technique; SBA to transfer from TTB to w/c at end of shower     Functional Mobility  Device: Rolling walker  Activity: To/From bathroom; Retrieve items;Transport items  Assistance Level: Contact guard assist  Bed Mobility  Overall Assistance Level: Stand By Assist  Additional Factors: Head of bed flat; With handrails  Supine to Sit  Assistance Level: Stand by assist  Skilled Clinical Factors: Increased time and minimal difficulty  Sit to Stand  Assistance Level: Contact guard assist  Skilled Clinical Factors: with 1-2 VCs for hand placement and safety  Stand to Sit  Assistance Level: Contact guard assist  Skilled Clinical Factors: with 1-2 VCs for hand placement   OT Exercises  Motor Control/Coordination: During PM session, OT facilitated patient engagement in fine motor control task with L hand to maximize IND with self-care and leisure occupations. Assessment  Assessment  Activity Tolerance: Patient limited by fatigue;Patient limited by endurance  OT Equipment Recommendations  Equipment Needed: Yes  Mobility Devices: ADL Assistive Devices  ADL Assistive Devices: Toileting - 3-in-1 Commode;Transfer Tub Bench;Grab Bars - toilet;Sock-Aid Hard;Reacher       Patient Education  Education  Education Given To: Patient  Education Provided: Role of Therapy;Plan of Care;Precautions; ADL Function;Mobility Training;Transfer Training;Equipment;DME/Home Modifications; Fall Prevention Strategies  Education Method: Demonstration;Verbal  Barriers to Learning: None  Education Outcome: Verbalized understanding;Demonstrated understanding;Continued education needed    Plan  Plan  Times per Week: 5-7  Times per Day: Twice a day  Current Treatment Recommendations: Self-Care / ADL; Strengthening;ROM;Balance training;Functional mobility training; Endurance training; Wheelchair mobility training    Goals  Patient Goals   Patient goals : \"To get my body that way so that I can take care of things on my own. \"  Short Term Goals  Time Frame for Short term goals: By 1 week  Short Term Goal 1: Pt will complete lower body dressing/bathing with min A and Good safety with use of AE as needed  Short Term Goal 2: Pt will complete upper body dressing with CGA  and Good safety while maintaining cervical precautions  Short Term Goal 3: Pt will complete functional transfers during self care tasks with min A and Good safety  Short Term Goal 4: Pt will tolerate standing 4+ minutes during functional activity of choice with min A and Good safety  Short Term Goal 5: Pt will verbalize/demonstrate Good understanding of cervical precautions during self care tasks with increase safety and independence with daily activities  Short Term Goal 6: Pt will participate in 30+ mintues of therapeutic exercises/functional activities to increase safety and independence with self care and mobility  Long Term Goals  Time Frame for Long term goals : By discharge  Long Term Goal 1: Pt will complete BADLs with modified independence and Good safety while maintaining cervical precautions  Long Term Goal 2: Pt will complete functional transfers during self care tasks with modified independence with Good safety   Long Term Goal 3: Pt will tolerate standing 8+ mintues during functional activity of choice with Good safety  Long Term Goal 4: Pt will verbalize/demonstrate Good understanding of home safety/fall prevention strategies to increase safety and independence with self care and mobility  Long Term Goal 5: Pt will verbalize/demonstrate Good understanding of adaptive strategies/AE/DME to assisit with maintaining cervical precautions and increase safety and independence with self care and mobility  Long Term Goal 6: Pt will complete simple meal prep/light house keeping tasks with CGA and Good safety  Long Term Goal 7: OT to assess FM and  strength      Josiah Live OT           04/25/22 0805 04/25/22 1406   OT Individual Minutes   Time In 0805 1406   Time Out [id number]   Minutes 61 33   Time Code Minutes    Timed Code Treatment Minutes 61 Minutes 33 Minutes

## 2022-04-25 NOTE — PROGRESS NOTES
Physical Medicine & Rehabilitation  Progress Note      Subjective:      62year-old female with nontraumatic cervical spinal cord injury secondary to servical stenosis. Patient is doing well today. She reports her pain and spasms are controlled. She continues to have intermittent issues with constipation. ROS:  Denies fevers, chills, sweats. No chest pain, palpitations, lightheadedness. Denies coughing, wheezing or shortness of breath. Denies abdominal pain, nausea, diarrhea   No new areas of joint pain. Denies new areas of numbness or weakness. Denies new anxiety or depression issues. No new skin problems. Rehabilitation:   Progressing in therapies. PT:  Restrictions/Precautions: Fall Risk,General Precautions  Implants present? : Metal implants  Other position/activity restrictions: s/p C2-C7 fixation  Required Braces or Orthoses  Cervical: c-collar (on when OOB)   Transfers  Sit to Stand: Moderate Assistance  Stand to sit: Moderate Assistance  Bed to Chair: Moderate assistance  Comment: Cues for errect posture, decreased coordination noted B LE L LE> R LE. Ambulation  Surface: level tile  Device: Rolling Walker  Other Apparatus: Wheelchair follow  Assistance: Minimal assistance  Quality of Gait: Pt demos difficulties with coordination. Pt demos an ataxic   Gait Deviations: Slow Sharon,Decreased step length,Decreased arm swing  Distance: 201' total. PT requires a seated rest break every ~50' (Breaks given PRN.)  More Ambulation?: No    Transfers  Sit to Stand: Moderate Assistance  Stand to sit: Moderate Assistance  Bed to Chair: Moderate assistance  Comment: Cues for errect posture, decreased coordination noted B LE L LE> R LE. Ambulation  Surface: level tile  Device: Rolling Walker  Other Apparatus: Wheelchair follow  Assistance: Minimal assistance  Quality of Gait: Pt demos difficulties with coordination.  Pt demos an ataxic   Gait Deviations: Slow Sharon,Decreased step length,Decreased arm swing  Distance: 201' total. PT requires a seated rest break every ~50' (Breaks given PRN.)  More Ambulation?: No    Surface: level tile  Ambulation  Surface: level tile  Device: Rolling Walker  Other Apparatus: Wheelchair follow  Assistance: Minimal assistance  Quality of Gait: Pt demos difficulties with coordination. Pt demos an ataxic   Gait Deviations: Slow Sharon,Decreased step length,Decreased arm swing  Distance: 201' total. PT requires a seated rest break every ~50' (Breaks given PRN.)  More Ambulation?: No    OT:  ADL  Equipment Provided: Sock aid,Long-handled sponge,Reacher  Feeding: Setup  Grooming: Minimal assistance  UE Bathing: Stand by assistance  LE Bathing: Minimal assistance  UE Dressing: Verbal cueing,Minimal assistance (standing during juanita care )  LE Dressing: Minimal assistance  Toileting: Minimal assistance  Additional Comments: Pt completed full shower this date. Seated on tub bench, utilizing hand held shower head and long handled sponge. Pt nust leav on c-collar during shower, changing to spare after shower is complete. Balance  Sitting Balance: Stand by assistance  Standing Balance: Minimal assistance   Standing Balance  Time: 1-2 min x3   Activity: transfers ,ADL activites   Comment: 1-2 UE support  Functional Mobility  Functional - Mobility Device: Wheelchair  Activity: To/from bathroom  Assist Level: Dependent/Total  Functional Mobility Comments: did not self propel      Bed mobility  Bridging:  (not indicated at this time)  Supine to Sit: Contact guard assistance  Sit to Supine: Minimal assistance  Scooting: Minimal assistance  Comment: PT mat, wedge, 3 pillows. Transfers  Stand Step Transfers: Minimal assistance  Stand Pivot Transfers: Minimal assistance  Sit to stand: Minimal assistance  Stand to sit: Minimal assistance  Transfer Comments: Pt min A for transfers this date with verbal cues required for hand/foot placement during transfers for safety.    Toilet Transfers  Toilet - Technique: Stand pivot,To left,To right  Equipment Used: Grab bars  Toilet Transfer: Minimal assistance  Toilet Transfers Comments: Verbal cues for hand placement and safety. L side weakness with LLE buckling. Shower Transfers  Shower - Transfer From: Wheelchair  Shower - Transfer Type: To and From  Shower - Transfer To: Transfer tub bench  Shower - Technique: Stand pivot,To right,To left  Shower Transfers: Contact Guard  Shower Transfers Comments: cues for safety   Wheelchair Bed Transfers  Wheelchair/Bed - Technique: Stand pivot  Equipment Used: Bed,Wheelchair  Level of Asssistance: Minimal assistance  Wheelchair Transfers Comments: Verbal cues for hand placement and safety. L side weakness with LLE buckling. SPEECH:      Objective:  /73   Pulse 89   Temp 99 °F (37.2 °C) (Oral)   Resp 16   Ht 5' 1\" (1.549 m)   Wt 140 lb (63.5 kg)   SpO2 97%   BMI 26.45 kg/m²       GEN: Well developed, well nourished, in NAD  HEENT:  NCAT. PERRL. EOMI. Mucous membranes pink and moist. Hard cervical collar in place  PULM:  Clear to ausculation. No rales or rhonchi. Respirations WNL and unlabored. CV:  Regular rate rhythm. No murmurs or gallops. GI:  Abdomen soft. Tender, distended. BS + and equal.    NEUROLOGICAL: A&O x3. Sensation intact to light touch. .   MSK:  Functional ROM BUE and BLEs. Motor testing 4/5 key muscles BUEs, 4-/5 B hip flexion, 4-/5 R knee extension, 4-/5 L knee extension, 4-/5 R dorsiflexion, 3/5 L dorsiflexion. SKIN: Warm dry and intact. Good turgor. EXTREMITIES:  No calf tenderness to palpation. No edema BLEs. PSYCH: Mood WNL. Appropriately interactive. Affect WNL.      Diagnostics:     CBC:   Recent Labs     04/23/22  0708   WBC 3.9   RBC 3.23*   HGB 9.5*   HCT 28.5*   MCV 88.1   RDW 21.3*        BMP:   Recent Labs     04/23/22  0708      K 4.1   CL 99   CO2 27   BUN 7*   CREATININE 0.59   GLUCOSE 94     BNP: No results for input(s): BNP in the last 72 hours. PT/INR: No results for input(s): PROTIME, INR in the last 72 hours. APTT: No results for input(s): APTT in the last 72 hours. CARDIAC ENZYMES: No results for input(s): CKMB, CKMBINDEX, TROPONINT in the last 72 hours. Invalid input(s): CKTOTAL;3 troponins   FASTING LIPID PANEL:  Lab Results   Component Value Date    CHOL 198 07/23/2020     (A) 07/23/2020    TRIG 96 07/23/2020     LIVER PROFILE: No results for input(s): AST, ALT, ALB, BILIDIR, BILITOT, ALKPHOS in the last 72 hours.      Current Medications:   Current Facility-Administered Medications: levothyroxine (SYNTHROID) tablet 100 mcg, 100 mcg, Oral, Daily  vitamin D3 (CHOLECALCIFEROL) tablet 5,000 Units, 5,000 Units, Oral, Weekly  baclofen (LIORESAL) tablet 10 mg, 10 mg, Oral, 4x Daily  lisinopril (PRINIVIL;ZESTRIL) tablet 5 mg, 5 mg, Oral, Daily  bisacodyl (DULCOLAX) suppository 10 mg, 10 mg, Rectal, Daily PRN  polyethylene glycol (GLYCOLAX) packet 17 g, 17 g, Oral, Daily  senna (SENOKOT) tablet 17.2 mg, 2 tablet, Oral, Daily PRN  acetaminophen (TYLENOL) tablet 650 mg, 650 mg, Oral, Q6H  magnesium hydroxide (MILK OF MAGNESIA) 400 MG/5ML suspension 30 mL, 30 mL, Oral, Daily PRN  ondansetron (ZOFRAN-ODT) disintegrating tablet 4 mg, 4 mg, Oral, Q8H PRN **OR** [DISCONTINUED] ondansetron (ZOFRAN) injection 4 mg, 4 mg, IntraVENous, Q6H PRN  oxyCODONE (ROXICODONE) immediate release tablet 5 mg, 5 mg, Oral, Q4H PRN **OR** oxyCODONE HCl (OXY-IR) immediate release tablet 10 mg, 10 mg, Oral, Q4H PRN  busPIRone (BUSPAR) tablet 30 mg, 30 mg, Oral, BID  dilTIAZem (CARDIZEM CD) extended release capsule 180 mg, 180 mg, Oral, Daily  docusate sodium (COLACE) capsule 200 mg, 200 mg, Oral, BID  enoxaparin (LOVENOX) injection 40 mg, 40 mg, SubCUTAneous, Daily  ferrous sulfate (IRON 325) tablet 325 mg, 325 mg, Oral, BID WC  methadone (DOLOPHINE) tablet 10 mg, 10 mg, Oral, BID  neomycin-bacitracin-polymyxin (NEOSPORIN) ointment, , Topical, BID  pantoprazole (PROTONIX) tablet 40 mg, 40 mg, Oral, Daily      Impression/Plan:   Impaired ADLs, gait, and mobility due to:      1. Nontraumatic cervical spinal cord injury secondary to cervical stenosis: s/p C5 corpectomy, C4-6 arthrodesis, open reduction cervical kyphosis, C2-7 posterior fusion performed by Dr. Mary Alice Ceballos on 4/12/2022. PT/OT for gait, mobility, strengthening, endurance, ADLs, and self care. Has Tylenol prn, methadone BID, robaxin prn, oxycodone prn for pain. She treats with [facility] for methadone and will require outpatient follow up with them at discharge and for methadone outpatient prescription. 2. Muscle spasms: resolved after discontinuing Robaxin and changed to routine baclofen. Dose increased 4/18. 3. Hx cauda equina syndrome: s/p L2-S1 laminectomy (December 2021)  4. L foot drop: therapy treating. Monitor for possible L AFO. 5. HTN: on Diltiazem. 6. Anemia: Hb low but improving. On ferrous sulfate. Monitoring. 7. Hypothyroidism: on levothyroxine. TSH 16, free T4 low normal - IM increased levothyroxine dose to 100 mcg daily 4/24.   8. Hyponatremia: improving. Monitoring. Medical management by IM  9. GERD: on pantoprazole. 10. Anxiety:on buspar. 11. Vitamin D deficiency: on repletion weekly  12. Hx breast cancer  13. Bowel Management/Neurogenic bowel: Miralax daily, senokot prn. Will change Dulcolax to q other day after therapies completed. 14. DVT Prophylaxis:  low molecular weight heparin, SCD's while in bed and HERMINIO's   15. Internal medicine for medical management      Electronically signed by Isabel Field MD on 4/25/2022 at 11:12 AM      This note is created with the assistance of a speech recognition program.  While intending to generate a document that actually reflects the content of the visit, the document can still have some errors including those of syntax and sound a like substitutions which may escape proof reading.   In such instances, actual meaning can be extrapolated by contextual diversion.

## 2022-04-26 PROCEDURE — 6370000000 HC RX 637 (ALT 250 FOR IP): Performed by: PHYSICAL MEDICINE & REHABILITATION

## 2022-04-26 PROCEDURE — 97530 THERAPEUTIC ACTIVITIES: CPT

## 2022-04-26 PROCEDURE — 6360000002 HC RX W HCPCS: Performed by: REGISTERED NURSE

## 2022-04-26 PROCEDURE — 99231 SBSQ HOSP IP/OBS SF/LOW 25: CPT | Performed by: INTERNAL MEDICINE

## 2022-04-26 PROCEDURE — 1180000000 HC REHAB R&B

## 2022-04-26 PROCEDURE — 6370000000 HC RX 637 (ALT 250 FOR IP): Performed by: REGISTERED NURSE

## 2022-04-26 PROCEDURE — 97110 THERAPEUTIC EXERCISES: CPT

## 2022-04-26 PROCEDURE — 97116 GAIT TRAINING THERAPY: CPT

## 2022-04-26 PROCEDURE — 6370000000 HC RX 637 (ALT 250 FOR IP): Performed by: INTERNAL MEDICINE

## 2022-04-26 PROCEDURE — 99232 SBSQ HOSP IP/OBS MODERATE 35: CPT | Performed by: PHYSICAL MEDICINE & REHABILITATION

## 2022-04-26 PROCEDURE — 97535 SELF CARE MNGMENT TRAINING: CPT

## 2022-04-26 RX ADMIN — BACLOFEN 10 MG: 10 TABLET ORAL at 08:33

## 2022-04-26 RX ADMIN — ACETAMINOPHEN 650 MG: 325 TABLET ORAL at 06:16

## 2022-04-26 RX ADMIN — DOCUSATE SODIUM 200 MG: 100 CAPSULE, LIQUID FILLED ORAL at 21:01

## 2022-04-26 RX ADMIN — PANTOPRAZOLE SODIUM 40 MG: 40 TABLET, DELAYED RELEASE ORAL at 06:16

## 2022-04-26 RX ADMIN — BUSPIRONE HYDROCHLORIDE 30 MG: 15 TABLET ORAL at 08:36

## 2022-04-26 RX ADMIN — METHADONE HYDROCHLORIDE 10 MG: 10 TABLET ORAL at 21:01

## 2022-04-26 RX ADMIN — ENOXAPARIN SODIUM 40 MG: 100 INJECTION SUBCUTANEOUS at 08:32

## 2022-04-26 RX ADMIN — ACETAMINOPHEN 650 MG: 325 TABLET ORAL at 17:43

## 2022-04-26 RX ADMIN — LISINOPRIL 5 MG: 5 TABLET ORAL at 08:27

## 2022-04-26 RX ADMIN — LEVOTHYROXINE SODIUM 100 MCG: 0.1 TABLET ORAL at 06:16

## 2022-04-26 RX ADMIN — FERROUS SULFATE TAB 325 MG (65 MG ELEMENTAL FE) 325 MG: 325 (65 FE) TAB at 08:27

## 2022-04-26 RX ADMIN — BACLOFEN 10 MG: 10 TABLET ORAL at 17:43

## 2022-04-26 RX ADMIN — OXYCODONE HYDROCHLORIDE 10 MG: 10 TABLET ORAL at 08:33

## 2022-04-26 RX ADMIN — DOCUSATE SODIUM 200 MG: 100 CAPSULE, LIQUID FILLED ORAL at 08:27

## 2022-04-26 RX ADMIN — BACLOFEN 10 MG: 10 TABLET ORAL at 12:14

## 2022-04-26 RX ADMIN — ACETAMINOPHEN 650 MG: 325 TABLET ORAL at 12:13

## 2022-04-26 RX ADMIN — POLYMYXIN B SULFATE, BACITRACIN ZINC, NEOMYCIN SULFATE: 5000; 3.5; 4 OINTMENT TOPICAL at 08:36

## 2022-04-26 RX ADMIN — OXYCODONE HYDROCHLORIDE 10 MG: 10 TABLET ORAL at 00:46

## 2022-04-26 RX ADMIN — FERROUS SULFATE TAB 325 MG (65 MG ELEMENTAL FE) 325 MG: 325 (65 FE) TAB at 17:43

## 2022-04-26 RX ADMIN — METHADONE HYDROCHLORIDE 10 MG: 10 TABLET ORAL at 08:33

## 2022-04-26 RX ADMIN — BACLOFEN 10 MG: 10 TABLET ORAL at 21:01

## 2022-04-26 RX ADMIN — DILTIAZEM HYDROCHLORIDE 180 MG: 180 CAPSULE, COATED, EXTENDED RELEASE ORAL at 08:36

## 2022-04-26 RX ADMIN — OXYCODONE HYDROCHLORIDE 10 MG: 10 TABLET ORAL at 14:54

## 2022-04-26 RX ADMIN — BUSPIRONE HYDROCHLORIDE 30 MG: 15 TABLET ORAL at 21:01

## 2022-04-26 RX ADMIN — OXYCODONE HYDROCHLORIDE 10 MG: 10 TABLET ORAL at 21:00

## 2022-04-26 RX ADMIN — POLYETHYLENE GLYCOL 3350 17 G: 17 POWDER, FOR SOLUTION ORAL at 16:10

## 2022-04-26 ASSESSMENT — PAIN DESCRIPTION - ORIENTATION
ORIENTATION: MID
ORIENTATION: MID

## 2022-04-26 ASSESSMENT — PAIN SCALES - GENERAL
PAINLEVEL_OUTOF10: 9
PAINLEVEL_OUTOF10: 7
PAINLEVEL_OUTOF10: 8

## 2022-04-26 ASSESSMENT — 9 HOLE PEG TEST
TEST_RESULT: IMPAIRED
TEST_RESULT: FUNCTIONAL
TESTTIME_SECONDS: 27
TESTTIME_SECONDS: 48

## 2022-04-26 ASSESSMENT — PAIN DESCRIPTION - LOCATION
LOCATION: NECK
LOCATION: NECK

## 2022-04-26 ASSESSMENT — PAIN DESCRIPTION - DESCRIPTORS: DESCRIPTORS: ACHING

## 2022-04-26 NOTE — PROGRESS NOTES
Physical Medicine & Rehabilitation  Progress Note      Subjective:      62year-old female with nontraumatic cervical spinal cord injury secondary to servical stenosis. Patient is observed ambulating with physical therapy today with persistent L foot drop noted. She reports good response to suppository last night with a normal BM. No new issues with pain, sleep, appetite, bladder. ROS:  Denies fevers, chills, sweats. No chest pain, palpitations, lightheadedness. Denies coughing, wheezing or shortness of breath. Denies abdominal pain, nausea, diarrhea   No new areas of joint pain. Denies new areas of numbness or weakness. Denies new anxiety or depression issues. No new skin problems. Rehabilitation:   Progressing in therapies. PT:  Restrictions/Precautions: Fall Risk,General Precautions  Implants present? : Metal implants  Other position/activity restrictions: s/p C2-C7 fixation  Required Braces or Orthoses  Cervical: c-collar (on when OOB)   Transfers  Sit to Stand: Moderate Assistance  Stand to sit: Moderate Assistance  Bed to Chair: Moderate assistance  Comment: Cues for errect posture, decreased coordination noted B LE L LE> R LE. Ambulation  Surface: level tile  Device: Rolling Walker  Other Apparatus: Wheelchair follow  Assistance: Minimal assistance  Quality of Gait: Pt demos difficulties with coordination. Pt demos an ataxic   Gait Deviations: Slow Sharon,Decreased step length,Decreased arm swing  Distance: 201' total. PT requires a seated rest break every ~50' (Breaks given PRN.)  More Ambulation?: No    Transfers  Sit to Stand: Moderate Assistance  Stand to sit: Moderate Assistance  Bed to Chair: Moderate assistance  Comment: Cues for errect posture, decreased coordination noted B LE L LE> R LE. Ambulation  Surface: level tile  Device: Rolling Walker  Other Apparatus: Wheelchair follow  Assistance: Minimal assistance  Quality of Gait: Pt demos difficulties with coordination.  Pt demos an ataxic   Gait Deviations: Slow Sharon,Decreased step length,Decreased arm swing  Distance: 201' total. PT requires a seated rest break every ~50' (Breaks given PRN.)  More Ambulation?: No    Surface: level tile  Ambulation  Surface: level tile  Device: Rolling Walker  Other Apparatus: Wheelchair follow  Assistance: Minimal assistance  Quality of Gait: Pt demos difficulties with coordination. Pt demos an ataxic   Gait Deviations: Slow Sharon,Decreased step length,Decreased arm swing  Distance: 201' total. PT requires a seated rest break every ~50' (Breaks given PRN.)  More Ambulation?: No    OT:  ADL  Equipment Provided: Sock aid,Long-handled sponge,Reacher  Feeding: Setup  Grooming: Minimal assistance  UE Bathing: Stand by assistance  LE Bathing: Minimal assistance  UE Dressing: Verbal cueing,Minimal assistance (standing during juanita care )  LE Dressing: Minimal assistance  Toileting: Minimal assistance  Additional Comments: Pt completed full shower this date. Seated on tub bench, utilizing hand held shower head and long handled sponge. Pt nust leav on c-collar during shower, changing to spare after shower is complete. Balance  Sitting Balance: Stand by assistance  Standing Balance: Minimal assistance   Standing Balance  Time: 1-2 min x3   Activity: transfers ,ADL activites   Comment: 1-2 UE support  Functional Mobility  Functional - Mobility Device: Wheelchair  Activity: To/from bathroom  Assist Level: Dependent/Total  Functional Mobility Comments: did not self propel      Bed mobility  Bridging:  (not indicated at this time)  Rolling to Left: Moderate assistance  Rolling to Right: Minimal assistance  Supine to Sit: Contact guard assistance  Sit to Supine: Minimal assistance  Scooting: Minimal assistance  Comment: PT mat, wedge, 3 pillows.    Transfers  Stand Step Transfers: Minimal assistance  Stand Pivot Transfers: Minimal assistance  Sit to stand: Minimal assistance  Stand to sit: Minimal assistance  Transfer Comments: Pt min A for transfers this date with verbal cues required for hand/foot placement during transfers for safety. Toilet Transfers  Toilet - Technique: Stand pivot,To left,To right  Equipment Used: Grab bars  Toilet Transfer: Minimal assistance  Toilet Transfers Comments: Verbal cues for hand placement and safety. L side weakness with LLE buckling. Shower Transfers  Shower - Transfer From: Wheelchair  Shower - Transfer Type: To and From  Shower - Transfer To: Transfer tub bench  Shower - Technique: Stand pivot,To right,To left  Shower Transfers: Contact Guard  Shower Transfers Comments: cues for safety   Wheelchair Bed Transfers  Wheelchair/Bed - Technique: Stand pivot  Equipment Used: Bed,Wheelchair  Level of Asssistance: Minimal assistance  Wheelchair Transfers Comments: Verbal cues for hand placement and safety. L side weakness with LLE buckling. SPEECH:      Objective:  /80   Pulse 100   Temp 98.9 °F (37.2 °C)   Resp 19   Ht 5' 1\" (1.549 m)   Wt 140 lb (63.5 kg)   SpO2 98%   BMI 26.45 kg/m²       GEN: Well developed, well nourished, in NAD  HEENT:  NCAT. PERRL. EOMI. Mucous membranes pink and moist. Hard cervical collar in place  PULM:  Clear to ausculation. No rales or rhonchi. Respirations WNL and unlabored. CV:  Regular rate rhythm. No murmurs or gallops. GI:  Abdomen soft. Tender, distended. BS + and equal.    NEUROLOGICAL: A&O x3. Sensation intact to light touch. .   MSK:  Functional ROM BUE and BLEs. Motor testing 4/5 key muscles BUEs, 4-/5 B hip flexion, 4-/5 R knee extension, 4-/5 L knee extension, 4-/5 R dorsiflexion, 3/5 L dorsiflexion. SKIN: Warm dry and intact. Good turgor. EXTREMITIES:  No calf tenderness to palpation. No edema BLEs. PSYCH: Mood WNL. Appropriately interactive. Affect WNL. Diagnostics:     CBC:   No results for input(s): WBC, RBC, HGB, HCT, MCV, RDW, PLT in the last 72 hours.   BMP:   No results for input(s): NA, K, CL, CO2, PHOS, BUN, CREATININE, CA, GLUCOSE in the last 72 hours. BNP: No results for input(s): BNP in the last 72 hours. PT/INR: No results for input(s): PROTIME, INR in the last 72 hours. APTT: No results for input(s): APTT in the last 72 hours. CARDIAC ENZYMES: No results for input(s): CKMB, CKMBINDEX, TROPONINT in the last 72 hours. Invalid input(s): CKTOTAL;3 troponins   FASTING LIPID PANEL:  Lab Results   Component Value Date    CHOL 198 07/23/2020     (A) 07/23/2020    TRIG 96 07/23/2020     LIVER PROFILE: No results for input(s): AST, ALT, ALB, BILIDIR, BILITOT, ALKPHOS in the last 72 hours.      Current Medications:   Current Facility-Administered Medications: bisacodyl (DULCOLAX) suppository 10 mg, 10 mg, Rectal, Q48H  levothyroxine (SYNTHROID) tablet 100 mcg, 100 mcg, Oral, Daily  vitamin D3 (CHOLECALCIFEROL) tablet 5,000 Units, 5,000 Units, Oral, Weekly  baclofen (LIORESAL) tablet 10 mg, 10 mg, Oral, 4x Daily  lisinopril (PRINIVIL;ZESTRIL) tablet 5 mg, 5 mg, Oral, Daily  polyethylene glycol (GLYCOLAX) packet 17 g, 17 g, Oral, Daily  senna (SENOKOT) tablet 17.2 mg, 2 tablet, Oral, Daily PRN  acetaminophen (TYLENOL) tablet 650 mg, 650 mg, Oral, Q6H  magnesium hydroxide (MILK OF MAGNESIA) 400 MG/5ML suspension 30 mL, 30 mL, Oral, Daily PRN  ondansetron (ZOFRAN-ODT) disintegrating tablet 4 mg, 4 mg, Oral, Q8H PRN **OR** [DISCONTINUED] ondansetron (ZOFRAN) injection 4 mg, 4 mg, IntraVENous, Q6H PRN  oxyCODONE (ROXICODONE) immediate release tablet 5 mg, 5 mg, Oral, Q4H PRN **OR** oxyCODONE HCl (OXY-IR) immediate release tablet 10 mg, 10 mg, Oral, Q4H PRN  busPIRone (BUSPAR) tablet 30 mg, 30 mg, Oral, BID  dilTIAZem (CARDIZEM CD) extended release capsule 180 mg, 180 mg, Oral, Daily  docusate sodium (COLACE) capsule 200 mg, 200 mg, Oral, BID  enoxaparin (LOVENOX) injection 40 mg, 40 mg, SubCUTAneous, Daily  ferrous sulfate (IRON 325) tablet 325 mg, 325 mg, Oral, BID WC  methadone (DOLOPHINE) tablet 10 mg, 10 mg, Oral, BID  neomycin-bacitracin-polymyxin (NEOSPORIN) ointment, , Topical, BID  pantoprazole (PROTONIX) tablet 40 mg, 40 mg, Oral, Daily      Impression/Plan:   Impaired ADLs, gait, and mobility due to:      1. Nontraumatic cervical spinal cord injury secondary to cervical stenosis: s/p C5 corpectomy, C4-6 arthrodesis, open reduction cervical kyphosis, C2-7 posterior fusion performed by Dr. Tabby Ledbetter on 4/12/2022. PT/OT for gait, mobility, strengthening, endurance, ADLs, and self care. Has Tylenol prn, methadone BID, robaxin prn, oxycodone prn for pain. She treats with [facility] for methadone and will require outpatient follow up with them at discharge and for methadone outpatient prescription. 2. Muscle spasms: resolved after discontinuing Robaxin and changed to routine baclofen. Dose increased 4/18. 3. Hx cauda equina syndrome: s/p L2-S1 laminectomy (December 2021)  4. L foot drop: therapy treating. Ordered orthotist eval for L AFO. 5. HTN: on Diltiazem. 6. Anemia: Hb low but improving. On ferrous sulfate. Monitoring. 7. Hypothyroidism: on levothyroxine. TSH 16, free T4 low normal - IM increased levothyroxine dose to 100 mcg daily 4/24.   8. Hyponatremia: improving. Monitoring. Medical management by IM  9. GERD: on pantoprazole. 10. Anxiety:on buspar. 11. Vitamin D deficiency: on repletion weekly  12. Hx breast cancer  13. Bowel Management/Neurogenic bowel: Miralax daily, senokot prn. Will change Dulcolax to q other day after therapies completed. 14. DVT Prophylaxis:  low molecular weight heparin, SCD's while in bed and HERMINIO's   15.  Internal medicine for medical management      Electronically signed by Carri Reinoso MD on 4/26/2022 at 9:08 AM      This note is created with the assistance of a speech recognition program.  While intending to generate a document that actually reflects the content of the visit, the document can still have some errors including those of syntax and sound a like substitutions which may escape proof reading. In such instances, actual meaning can be extrapolated by contextual diversion.

## 2022-04-26 NOTE — PROGRESS NOTES
Occupational Therapy  Facility/Department: [facility]  Rehabilitation Occupational Therapy Daily Treatment Note    Date: 22  Patient Name: Maya Nevarez       Room: 3024/0170-05  MRN: [id number]  Account: [de-identified]   : 1960  (62 y.o.) Gender: female     Past Medical History:  has a past medical history of Anxiety, Arthritis, At maximum risk for fall, Cancer ([address] Cauda equina syndrome ([address] Cervical stenosis of spine, GERD (gastroesophageal reflux disease), History of stomach ulcers, Hypertension, Hypothyroidism, Lumbar neuritis, Post laminectomy syndrome, Spinal deformity, Thyroid disease, Uses wheelchair, Wears dentures, and Wellness examination. Past Surgical History:   has a past surgical history that includes hernia repair (Bilateral); Breast surgery (Right); Mastectomy, radical; Hysterectomy, total abdominal; Lumbar spine surgery (N/A, 2021); back surgery; Colonoscopy (about ); other surgical history (2022); cervical fusion (N/A, 2022); and cervical fusion (N/A, 2022).     Restrictions  Restrictions/Precautions: Fall Risk;General Precautions  Implants present? : Metal implants  Other position/activity restrictions: s/p C2-C7 fixation  Required Braces or Orthoses?: Yes (C-collar when OOB)    Subjective  Subjective: \"I understand if they think I need to stay, but I would like to leave sooner\"  Pain Level: 7  Pain Location: Neck  Pain Orientation: Mid  Restrictions/Precautions: Fall Risk;General Precautions        Pain Assessment  Pain Assessment: 0-10  Pain Level: 7  Patient's Stated Pain Goal: 0 - No pain  Pain Location: Neck  Pain Orientation: Mid  Pain Descriptors: Aching (\"Stiff\")  Functional Pain Assessment: Activities are not prevented  Pain Frequency: Continuous  Non-Pharmaceutical Pain Intervention(s): Shower,Ambulation/Increased Activity,Distraction  Response to Pain Intervention: Patient satisfied    Objective     Cognition  Overall Cognitive Status: WNL  Patient affect[de-identified] Normal  Orientation  Overall Orientation Status: Within Functional Limits         ADL  Feeding  Assistance Level: Modified independent  Grooming/Oral Hygiene  Assistance Level: Modified independent  Skilled Clinical Factors: w/c level at sink for denture care and hair grooming  Upper Extremity Bathing  Assistance Level: Set-up  Skilled Clinical Factors: Completed in shower seated on bench with HH shower head  Lower Extremity Bathing  Equipment Provided: Long-handled sponge  Assistance Level: Stand by assist  Skilled Clinical Factors: Close SBA for safety when standing for juanita-area/buttocks, no LOB observed, unsteady at times but demo'd good safety awareness and use of GB for support  Upper Extremity Dressing  Assistance Level: Stand by assist  Skilled Clinical Factors: Cues for proper placement of back of C-collar and minor adjustments of T-shirt under C-collar in back  Lower Extremity Dressing  Equipment Provided: Reachers  Assistance Level: Stand by assist  Skilled Clinical Factors: Pt demo'd increased ease pulling pull-up/pants up over hips on L side this date. Putting On/Taking Off Footwear  Equipment Provided:  (Elastic shoelaces)  Assistance Level: Set-up  Skilled Clinical Factors: A for Wood (pt reports that she is not planning to wear them at home); Pt doffed slipper socks and donned shoes without A this date.   Toileting  Assistance Level: Stand by assist  Skilled Clinical Factors: Per pt report  Toilet Transfers  Equipment: Grab bars;Standard toilet  Assistance Level: Stand by assist  Skilled Clinical Factors: w/RW; Per pt report she has been getting up every time she has to use the bathroom, even overnight  Tub/Shower Transfers  Type: Shower  Transfer From: Rolling walker  Transfer To: Tub transfer bench  Additional Factors: Verbal cues  Assistance Level: Stand by assist  Skilled Clinical Factors: Pt given one reminder to start up ramp leading with stronger leg, no LOB and Good use of  for support. Pt reports that she has a tub with TTB at home. Functional Mobility  Device: Rolling walker  Activity: To/From bathroom  Assistance Level: Stand by assist  Skilled Clinical Factors: Unsteady at times but no LOB, Good safety awareness observed  Bed Mobility  Overall Assistance Level: Modified Independent  Additional Factors: With handrails; Increased time to complete  Supine to Sit  Assistance Level: Modified independent  Skilled Clinical Factors: Increased time, used railing  Scooting  Assistance Level: Independent  Sit to Stand  Assistance Level: Modified independent  Skilled Clinical Factors: No cuing required this date, self-corrected hand placement  Stand to Sit  Assistance Level: Modified independent  Skilled Clinical Factors: No cuing required this date, self-corrected hand placement   OT Exercises  Motor Control/Coordination: Pt opened/closed several different size bottles and containers with minimal difficulty for smaller ones;  Pt also completed graded clothespins - placed red/yellow with L hand, placed blue/green with R hand, and then removed all with L hand; Pt then placed 5 key pegs with each hand, increased time/difficulty on L      Left Hand Strength -  (lbs)  Handle Setting 2: 30, 32 - Average 31# (increased 17.7#) (Norms 35-57#)     Right Hand Strength -  (lbs)  Handle Setting 2: 51, 62 - Average 56.5# (increased 21.5#) (Norms 35-57#)     Fine Motor Skills  Left 9-Hole Peg Test: Impaired (Norms 19-28s)  Left 9 Hole Peg Test Time (secs): 48 (Decreased by 18 seconds)  Right 9-Hole Peg Test: Functional  Right 9 Hole Peg Test Time (secs): 27 (Decreased by 3.01 seconds)    Assessment  Assessment  Activity Tolerance: Patient limited by fatigue;Patient limited by endurance  Discharge Recommendations: Home with assist PRN;Patient would benefit from continued therapy after discharge  OT Equipment Recommendations  Equipment Needed: Yes  Mobility Devices: ADL Assistive Devices  ADL Assistive Devices: Reacher;Hand-held Shower  Other: Pt reports that she has a TTB already but it hasn't been set-up yet. Safety Devices  Safety Devices in place: Yes  Type of devices:  (Left in w/c with PTA)    Patient Education  Education  Education Given To: Patient  Education Provided: Safety;ADL Function;Mobility Training; Fall Prevention Strategies (Discharge planning)  Education Method: Demonstration;Verbal  Barriers to Learning: None  Education Outcome: Verbalized understanding;Demonstrated understanding    Plan  Plan  Times per Week: 5-7  Times per Day: Twice a day  Current Treatment Recommendations: Self-Care / ADL; Strengthening;ROM;Balance training;Functional mobility training; Endurance training; Wheelchair mobility training    Goals  Patient Goals   Patient goals : \"To get my body that way so that I can take care of things on my own. \"  Short Term Goals  Time Frame for Short term goals: By 1 week  Short Term Goal 1: Pt will complete lower body dressing/bathing with min A and Good safety with use of AE as needed  Short Term Goal 2: Pt will complete upper body dressing with CGA  and Good safety while maintaining cervical precautions  Short Term Goal 3: Pt will complete functional transfers during self care tasks with min A and Good safety  Short Term Goal 4: Pt will tolerate standing 4+ minutes during functional activity of choice with min A and Good safety  Short Term Goal 5: Pt will verbalize/demonstrate Good understanding of cervical precautions during self care tasks with increase safety and independence with daily activities  Short Term Goal 6: Pt will participate in 30+ mintues of therapeutic exercises/functional activities to increase safety and independence with self care and mobility  Long Term Goals  Time Frame for Long term goals : By discharge  Long Term Goal 1: Pt will complete BADLs with modified independence and Good safety while maintaining cervical precautions  Long Term Goal 2: Pt will complete functional transfers during self care tasks with modified independence with Good safety   Long Term Goal 3: Pt will tolerate standing 8+ mintues during functional activity of choice with Good safety  Long Term Goal 4: Pt will verbalize/demonstrate Good understanding of home safety/fall prevention strategies to increase safety and independence with self care and mobility  Long Term Goal 5: Pt will verbalize/demonstrate Good understanding of adaptive strategies/AE/DME to assisit with maintaining cervical precautions and increase safety and independence with self care and mobility  Long Term Goal 6: Pt will complete simple meal prep/light house keeping tasks with CGA and Good safety    Therapy Time   Individual Concurrent Group Co-treatment   Time In 0929         Time Out 1059         Minutes 90         Timed Code Treatment Minutes: [address] [facility]

## 2022-04-26 NOTE — PLAN OF CARE
Problem: Skin Integrity:  Goal: Will show no infection signs and symptoms  Description: Will show no infection signs and symptoms  Outcome: Progressing  Goal: Absence of new skin breakdown  Description: Absence of new skin breakdown  Outcome: Progressing     Problem: Falls - Risk of:  Goal: Will remain free from falls  Description: Will remain free from falls  Outcome: Progressing  Goal: Absence of physical injury  Description: Absence of physical injury  Outcome: Progressing     Problem: Discharge Planning:  Goal: Discharged to appropriate level of care  Description: Discharged to appropriate level of care  Outcome: Progressing     Problem: Pain:  Goal: Pain level will decrease  Description: Pain level will decrease  Outcome: Progressing  Goal: Control of acute pain  Description: Control of acute pain  Outcome: Progressing  Goal: Control of chronic pain  Description: Control of chronic pain  Outcome: Progressing     Problem: Musculor/Skeletal Functional Status  Goal: Highest potential functional level  Outcome: Progressing  Goal: Absence of falls  Outcome: Progressing     Problem: Discharge Planning  Goal: Discharge to home or other facility with appropriate resources  Outcome: Progressing     Problem: Nutrition  Goal: Optimal nutrition therapy  Outcome: Progressing

## 2022-04-26 NOTE — PROGRESS NOTES
[address] Internal Medicine  Quan Ball MD; Osie Merritt MD; Nohemi Menendez MD; MD Dalton Medeiros MD; Franciso Schaumann, MD    [facility]   [address] / HISTORY AND PHYSICAL EXAMINATION            Date:   4/26/2022  Patient name:  Bonnie Ramon  Date of admission:  4/15/2022  5:32 PM  MRN:   [id number]  Account:  [de-identified]  YOB: 1960  PCP:    Felicia Lambert MD  Room:   [address] [place] [address]  Code Status:    Full Code    Physician Requesting Consult: Ronda Cardoso MD    Reason for Consult: Medical management    Chief Complaint:     No chief complaint on file. Underwent anterior C5 corpectomy, posterior fixation from C2-T1 with arthrodesis, osteotomy, correction of deformity on April 12    History Obtained From:     patient    History of Present Illness: The patient is a 61 y.o. female presented for elective surgery. Underwent anterior C5 corpectomy, posterior fixation from C2-T1 with arthrodesis, osteotomy, correction of deformity on 4/12 with Dr. Radha Small 4/12. Symptoms include significant left-sided paresis, severe lower extremity weakness (wheelchair bound, now unable to stand or ambulate [id number]), progressively worsening paresthesias and dexterity issues with left upper extremity and left lower extremity.  Additionally complains of some moderate saddle anesthesia, urge incontinence but no bowel dysfunction.   Admitted to acute rehab for further management for deconditioning,  4/22  No new complaints  Pain is controlled on methadone  Working with therapy      Past Medical History:     Past Medical History:   Diagnosis Date    Anxiety     Arthritis     At maximum risk for fall 12/29/2021    caudal equina syndrome and cervical stenosis    Cancer ([address]     right breast    Cauda equina syndrome ([address] 12/29/2021    neuropathy, mobility difficulty, incontinance    Cervical stenosis of spine 12/29/2021    GERD (gastroesophageal reflux disease)     History of stomach ulcers     Hypertension     Dr. Shahnaz Díaz    Hypothyroidism     Lumbar neuritis     Post laminectomy syndrome     Spinal deformity 11/2021    cervical and lumbar    Thyroid disease     Uses wheelchair     Wears dentures     Wellness examination     Dr. Shahnaz Díaz        Past Surgical History:     Past Surgical History:   Procedure Laterality Date    BACK SURGERY      lower back x 3, cervical- x 1: C4- C6, 11/4/2014     BREAST SURGERY Right     mastectomy with reconstruction    CERVICAL FUSION N/A 4/12/2022    C5 CORPECTOMY, USE OF INTRAOPERATIVE CERVICAL TRACTION performed by Reinaldo Duenas DO at [address] [place] [address] N/A 4/12/2022    POSTERIOR FIXATION C2-C7 performed by Reinaldo Duneas DO at [facility]  about 2018    HERNIA REPAIR Bilateral     inguinal    HYSTERECTOMY, TOTAL ABDOMINAL      LUMBAR SPINE SURGERY N/A 12/30/2021    LUMBAR LAMINECTOMY L2-S1 performed by Reinaldo Duenas DO at [facility] HISTORY  04/12/2022    C5 CORPECTOMY, USE OF INTRAOPERATIVE CERVICAL TRACTION (N/A Spine Cervical)         Medications Prior to Admission:     Prior to Admission medications    Medication Sig Start Date End Date Taking?  Authorizing Provider   dilTIAZem (DILACOR XR) 180 MG extended release capsule TAKE ONE CAPSULE BY MOUTH DAILY 4/1/22   Balnquita Watts MD   ferrous sulfate (IRON 325) 325 (65 Fe) MG tablet Take 1 tablet by mouth 2 times daily (with meals) 1/13/22   Evelina Clark MD   docusate sodium (COLACE) 100 MG capsule Take 2 capsules by mouth 2 times daily 1/13/22   Evelina Clark MD   pantoprazole (PROTONIX) 40 MG tablet TAKE ONE TABLET BY MOUTH DAILY 12/20/21   Clive Hough MD   busPIRone (BUSPAR) 30 MG tablet Take 30 mg by mouth 2 times daily 12/17/21   Clive Hough MD   levothyroxine (SYNTHROID) 88 MCG tablet Take 1 tablet by mouth Daily 12/17/21 3/18/22  Kimberly Reyes MD   tiZANidine (ZANAFLEX) 4 MG tablet Take 4 mg by mouth every 12 hours    Historical Provider, MD   Vitamin D, Ergocalciferol, 50 MCG (2000 UT) CAPS TAKE ONE CAPSULE BY MOUTH DAILY 3/29/21   Roseanna Yang PA-C   HYDROcodone-acetaminophen [facility]) 7.5-325 MG per tablet Up to three tablets a day. 10/21/15   Historical Provider, MD   methadone (DOLOPHINE) 10 MG tablet Take 10 mg by mouth 2 times daily. Historical Provider, MD        Allergies:     Latex and Kiwi extract    Social History:     Tobacco:    reports that she quit smoking about 8 years ago. She has a 15.00 pack-year smoking history. She has never used smokeless tobacco.  Alcohol:      reports current alcohol use. Drug Use:  reports no history of drug use. Family History:     Family History   Problem Relation Age of Onset    Hypertension Mother     Heart Disease Mother     Cancer Mother        Review of Systems:     Positive and Negative as described in HPI. CONSTITUTIONAL:  negative for fevers, chills, sweats, fatigue, weight loss  HEENT:  negative for vision, hearing changes, runny nose, throat pain  RESPIRATORY:  negative for shortness of breath, cough, congestion, wheezing. CARDIOVASCULAR:  negative for chest pain, palpitations.   GASTROINTESTINAL:  negative for nausea, vomiting, diarrhea, constipation, change in bowel habits, abdominal pain   GENITOURINARY:  negative for difficulty of urination, burning with urination, frequency   INTEGUMENT:  negative for rash, skin lesions, easy bruising   HEMATOLOGIC/LYMPHATIC:  negative for swelling/edema   ALLERGIC/IMMUNOLOGIC:  negative for urticaria , itching  ENDOCRINE:  negative increase in drinking, increase in urination, hot or cold intolerance  MUSCULOSKELETAL:  negative joint pains, muscle aches, swelling of joints  NEUROLOGICAL:  negative for headaches, dizziness, lightheadedness, numbness, pain, tingling extremities  BEHAVIOR/PSYCH: negative for depression, anxiety    Physical Exam:     /80   Pulse 100   Temp 98.9 °F (37.2 °C)   Resp 19   Ht 5' 1\" (1.549 m)   Wt 140 lb (63.5 kg)   SpO2 98%   BMI 26.45 kg/m²   Temp (24hrs), Av °F (37.2 °C), Min:98.9 °F (37.2 °C), Max:99.1 °F (37.3 °C)    No results for input(s): POCGLU in the last 72 hours. No intake or output data in the 24 hours ending 22 1436    General Appearance:  alert, well appearing, and in no acute distress  Mental status: oriented to person, place, and time with normal affect  Head:  normocephalic, atraumatic. Eye: no icterus, redness, pupils equal and reactive, extraocular eye movements intact, conjunctiva clear  Ear: normal external ear, no discharge, hearing intact  Nose:  no drainage noted  Mouth: mucous membranes moist  Neck: supple, no carotid bruits, thyroid not palpable  Lungs: Bilateral equal air entry, clear to ausculation, no wheezing, rales or rhonchi, normal effort  Cardiovascular: normal rate, regular rhythm, no murmur, gallop, rub. Abdomen: Soft, nontender, nondistended, normal bowel sounds, no hepatomegaly or splenomegaly  Neurologic: There are no new focal motor or sensory deficits, normal muscle tone and bulk, no abnormal sensation, normal speech, cranial nerves II through XII grossly intact  Skin: No gross lesions, rashes, bruising or bleeding on exposed skin area  Extremities:  peripheral pulses palpable, no pedal edema or calf pain with palpation  Psych: normal affect    Investigations:      Laboratory Testing:  No results found for this or any previous visit (from the past 24 hour(s)). Imaging/Diagonstics:  XR CERVICAL SPINE (2-3 VIEWS)    Result Date: 2022  Expected postoperative findings status post anterior and posterior cervical fusion. XR CHEST PORTABLE    Result Date: 2022  No acute cardiopulmonary disease. XR CERVICAL SPINE FLEXION AND EXTENSION    Result Date: 2022  1.  Retrolisthesis of C2 on C3 measures 2 mm on extension view and reduces on flexion view. 2. No evidence of instability of anterolisthesis of C3 on C4 measuring 1.5 mm. 3. No acute fracture on limited views. Of note, the C6-C7 and C7-T1 levels are not well seen due to overlying soft tissues. 4. Severe multilevel degenerative changes. CTA NECK W CONTRAST    Result Date: 4/12/2022  Unremarkable CTA of the neck. Minor hazy pleural thickening and chronic-appearing medial right upper lobe atelectasis/scarring. If there is any clinical concern, follow-up noncontrast chest CT may be useful. RECOMMENDATIONS: Unavailable       Assessment :      Hospital Problems           Last Modified POA    * (Principal) Spinal cord injury, cervical region, sequela ([facility] 4/15/2022 Yes    Essential hypertension 4/16/2022 Yes    Hypothyroidism 4/16/2022 Yes    Post-menopausal osteoporosis 4/16/2022 Yes    Cervical myelopathy ([facility] 4/16/2022 Yes    Anemia, normocytic normochromic 4/16/2022 Yes          Plan:     1. Spinal cord injury status post anterior C5 corpectomy, posterior fixation from C2-T1 with arthrodesis, osteotomy,  2. Essential hypertension,  Controlled, continue monitor blood pressure, continue home medications, added lisinopril, diltiazem,Controlled   3. Hyponatremia, better now ,  4. Constipation , prn meds   5. Hypothyroidism, continue levothyroxine,  6. Continue physical therapy,  7. DVT prophylaxis    Consultations:   IP CONSULT TO DIETITIAN  IP CONSULT TO SOCIAL WORK  IP CONSULT TO INTERNAL MEDICINE  INPATIENT CONSULT TO ORTHOTIST/PROSTHETIST      4/24/22    · TSH 16 ,free t4 low normal .  · Increase dose levothyroxine to 100 mcg daily  1. Progressing satisfactory with rehab therapies . 4/25  Tolerating higher dose Synthroid, TSH to be repeated in 3 months outpatient        4/26   Patient reports no new complaints. Engaging with physical therapy. Labs and vitals reviewed. No new issues. Continue with current care.                  Vlad Walker, MD  4/26/2022  2:36 PM    Copy sent to Dr. Radha Block MD    Please note that this chart was generated using voice recognition Dragon dictation software. Although every effort was made to ensure the accuracy of this automated transcription, some errors in transcription may have occurred.

## 2022-04-26 NOTE — CARE COORDINATION
Faxed order for L OFA to [facility]. I called and left message hoping they would be able to bring it today while they are seeing another ARU patient. Fax confirmation received.

## 2022-04-26 NOTE — CARE COORDINATION
Called left message regarding patient with [facility] . Patient follow w/their office. Requested a call back regarding her Methadone Rx to see when she would need a refill/ we do not want interruption of medicine.

## 2022-04-26 NOTE — PLAN OF CARE
Problem: Skin Integrity:  Goal: Will show no infection signs and symptoms  Description: Will show no infection signs and symptoms  4/26/2022 1252 by Sarah Priest  Outcome: Progressing     Problem: Skin Integrity:  Goal: Absence of new skin breakdown  Description: Absence of new skin breakdown  4/26/2022 1252 by Sarah Priest  Outcome: Progressing     Problem: Falls - Risk of:  Goal: Will remain free from falls  Description: Will remain free from falls  4/26/2022 1252 by Sarah Priest  Outcome: Progressing     Problem: Falls - Risk of:  Goal: Absence of physical injury  Description: Absence of physical injury  4/26/2022 1252 by Sarah Preist  Outcome: Progressing     Problem: Discharge Planning:  Goal: Discharged to appropriate level of care  Description: Discharged to appropriate level of care  4/26/2022 1252 by Sarah Priest  Outcome: Progressing     Problem: Pain:  Goal: Control of chronic pain  Description: Control of chronic pain  4/26/2022 1252 by Sarah Priest  Outcome: Progressing

## 2022-04-26 NOTE — PROGRESS NOTES
Physical Therapy  Facility/Department: [facility] ACUTE REHAB  Rehabilitation Physical Therapy Daily Treatment Note    NAME: Lynn Miller  : 1960 (62 y.o.)  MRN: [id number]  CODE STATUS: Full Code    Date of Service: 22    Chart Reviewed: Yes  Patient assessed for rehabilitation services?: Yes  Additional Pertinent Hx: Lynn Miller is a 62 y.o. female with history of cauda equina syndrome s/p L2-S1 laminectomy (2021), HTN, hypothyroidism, GERD, breast cancer, and anxiety admitted to [facility] on 2022. She presented for planned surgical intervention for cervical stenosis. She underwent C5 corpectomy with C4-C6 arthrodesis, open reduction of cervical kyphotic deformity, and C2-C7 posterior fusion on 22 (Dr. Brandon Leonard). She has a cervical collar when out of bed. After her lumbar surgery in Dec 2021, patient had been improving initially with therapy, however overall has regressed. . Pt admitted to rehab unit on 4/15/22. Family / Caregiver Present: No  Referral Date : 04/15/22  Diagnosis: Nontraumatic cervical spinal cord injury secondary to cervical stenosis s/p C5 corpectomy with C4-C6 arthrodesis, open reduction of cervical kyphotic deformity, and C2-C7 posterior fusion  General Comment  Comments: Pt is awaiting LLE AFO order per PT [place]. Restrictions:  Restrictions/Precautions: Fall Risk;General Precautions  Required Braces or Orthoses  Cervical: c-collar (on when OOB)  Position Activity Restriction  Other position/activity restrictions: s/p C2-C7 fixation     SUBJECTIVE  Subjective: Pt is pleasant and agreeable to PT. Pt states \"My leg's are stiff\". Pt demos good efforts throughout tx.    Pain: Reports continued pain through R side of neck/shoulder       OBJECTIVE  Vision  Vision Exceptions: Wears glasses for reading    Hearing  Hearing: Within functional limits    Cognition  Overall Cognitive Status: WNL    Sensation  Overall Sensation Status: Impaired (Bilateral hand and feet; pt reports improvement in sensation)    Functional Mobility  Bed mobility  Supine to Sit: Stand by assistance  Sit to Supine: Stand by assistance  Scooting: Stand by assistance  Comment: PT mat, wedge, 3 pillows. Transfers  Sit to Stand: Stand by assistance;Contact guard assistance  Stand to sit: Stand by assistance;Contact guard assistance  Comment: Cues for errect posture, decreased coordination noted B LE L LE> R LE. Transfers completed with RW. No LOB noted. Balance  Posture: Fair  Sitting - Static: Good;-  Sitting - Dynamic: Fair  Standing - Static: Fair  Standing - Dynamic: Poor;+  Comments: Standing with rolling walker. Environmental Mobility  Ambulation  Surface: level tile  Device: Rolling Walker  Assistance: Contact guard assistance (SBA for shorter distances and CGA for longer distances )  Quality of Gait: Pt demos decrease LLE cordination, internal rotation with toe off, and decrease step height. Occassional NBOS noted. Gait Deviations: Slow Sharon;Decreased step length;Decreased arm swing  Distance: 100'x2 and 30'x2  Comments: Seated rest breaks at 100'   More Ambulation?: No  Ambulation 2  Surface - 2: level tile  Device 2: Rolling Walker  Assistance 2: Contact guard assistance (SBA for shorter distances and CGA for longer distances )  Quality of Gait 2: same as above  Gait Deviations: Slow Sharon;Decreased step height  Distance: 100'x2  Comments: STanding rest breaks PRN. Stairs/Curb  Stairs?: Yes   04/26/22 0935   Stairs/Curb   Stairs? Yes   Stairs   # Steps  5  (x2)   Stairs Height 4\"  (and 6\")   Rails Bilateral   Device No Device   Assistance Contact guard assistance   Comment Completed in a step to pattern. Pt occassionally demos NBOS however able to correct with cues. PT Exercises  Exercise Treatment: Seated HS and piriformis stretches. Reps: 3x~10-20 sec each.    A/AROM Exercises: Seated BLE exs x 20, 2# on RLE, AAROM on LLE  Resistive Exercises: Seated orange band  x 20 reps  Circulation/Endurance Exercises: Supine BLE ex's with 2# on RLE and AA/AROM on LLE. Reps: x10  Static Standing Balance Exercises: Marching, 3-way hip, HS curls, Heel raises - 10 reps each inside parallel bars  Exercise Equipment: Nustep x 10 minutes Wrokload 3    ASSESSMENT  Vitals  O2 Device: None (Room air)    Activity Tolerance  Activity Tolerance: Patient limited by fatigue;Patient limited by endurance    Assessment  Assessment:  pt will continue to benefit from PT to progress activity and promote safety with gait and transfers  Treatment Diagnosis: B LE/B UE weakness, difficulty walking  Therapy Prognosis: Good  Decision Making: High Complexity  Barriers to Learning: none  Discharge Recommendations: Home with assist PRN;Patient would benefit from continued therapy after discharge  PT D/C Equipment  Other: Pt has rolling walker, rollator, manual wheelchair and lift chair at home  PT Equipment Recommendations  Equipment Needed: No  Other: Pt has rolling walker, rollator, manual wheelchair and lift chair at home    CLINICAL IMPRESSION    pt will continue to benefit from PT to progress activity and promote safety with gait and transfers    GOALS  Patient Goals   Patient goals : Be able to walk and do things for myself  Short Term Goals  Time Frame for Short term goals: 10 days  Short term goal 1: Pt will perform bed mobility/rolling with SBA  Short term goal 2: Pt will perform transfers with Miryam  Short term goal 3: Pt will ambulate 50 to 100 ft, with rolling walker, Miryam. Short term goal 4: Pt will perform wheelchair mobility for 150 ft, SB stright path, min A for corners. Short term goal 5: Pt able to perform 3 to 5 steps with 2 rails, min/mod A  Long Term Goals  Time Frame for Long term goals : By DC  Long term goal 1: Pt able to perform sit<>stand transfers, mod-I  Long term goal 2: Pt able to perform pivot transfers at SBA/supervsion.   Long term goal 3: Pt able to ambulate with rolling walker 100 to 150 ft , level surfaces, with rolling walker, CGA. Long term goal 4: Pt able to perform curb step with UE support at [address] term goal 5: Pt able to perform 5 to 12 steps with 2 rails at min A to improve LE strength/coordination  Long term goal 6: Pt able to propel w/c  distance of 150 ft SBA level surfaces  Long term goal 7: Pt able to ambulate on outside terraine/incline surface at min A with rolling walker  Long term goal 8: Pt able to ambulate distance of 60 to 70 ft for 2MWT to improve fucntion and gait speed. Long term goal 5: Family training for safe functional mobility for DC home    PLAN OF CARE  Frequency:  minutes per day, 5-7days per wk  Plan  Current Treatment Recommendations: Strengthening;ROM;Balance training;Functional mobility training; Endurance training;ADL/Self-care training;Transfer training;Stair training;Home exercise program;Safety education & training;Patient/Caregiver education & training;Equipment evaluation, education, & procurement; Neuromuscular re-education;Gait training  Safety Devices  Type of Devices: All fall risk precautions in place;Call light within reach;Gait belt;Patient at risk for falls; Left in bed;Nurse notified; Bed alarm in place  Restraints  Restraints Initially in Place: No    Therapy Time     04/26/22 0935 04/26/22 1334   PT Individual Minutes   Time In 1101 1334   Time Out 1203 1404   Minutes 62  [place], [place], 04/26/22 at 4:02 PM

## 2022-04-27 PROCEDURE — 97110 THERAPEUTIC EXERCISES: CPT

## 2022-04-27 PROCEDURE — 97535 SELF CARE MNGMENT TRAINING: CPT

## 2022-04-27 PROCEDURE — 6370000000 HC RX 637 (ALT 250 FOR IP): Performed by: PHYSICAL MEDICINE & REHABILITATION

## 2022-04-27 PROCEDURE — 6360000002 HC RX W HCPCS: Performed by: REGISTERED NURSE

## 2022-04-27 PROCEDURE — 6370000000 HC RX 637 (ALT 250 FOR IP): Performed by: REGISTERED NURSE

## 2022-04-27 PROCEDURE — 99232 SBSQ HOSP IP/OBS MODERATE 35: CPT | Performed by: PHYSICAL MEDICINE & REHABILITATION

## 2022-04-27 PROCEDURE — 97530 THERAPEUTIC ACTIVITIES: CPT

## 2022-04-27 PROCEDURE — 6370000000 HC RX 637 (ALT 250 FOR IP): Performed by: INTERNAL MEDICINE

## 2022-04-27 PROCEDURE — 1180000000 HC REHAB R&B

## 2022-04-27 PROCEDURE — 97116 GAIT TRAINING THERAPY: CPT

## 2022-04-27 RX ADMIN — PANTOPRAZOLE SODIUM 40 MG: 40 TABLET, DELAYED RELEASE ORAL at 06:38

## 2022-04-27 RX ADMIN — ACETAMINOPHEN 650 MG: 325 TABLET ORAL at 17:12

## 2022-04-27 RX ADMIN — ACETAMINOPHEN 650 MG: 325 TABLET ORAL at 06:38

## 2022-04-27 RX ADMIN — METHADONE HYDROCHLORIDE 10 MG: 10 TABLET ORAL at 20:42

## 2022-04-27 RX ADMIN — DILTIAZEM HYDROCHLORIDE 180 MG: 180 CAPSULE, COATED, EXTENDED RELEASE ORAL at 08:11

## 2022-04-27 RX ADMIN — DOCUSATE SODIUM 200 MG: 100 CAPSULE, LIQUID FILLED ORAL at 08:09

## 2022-04-27 RX ADMIN — BUSPIRONE HYDROCHLORIDE 30 MG: 15 TABLET ORAL at 08:11

## 2022-04-27 RX ADMIN — LEVOTHYROXINE SODIUM 100 MCG: 0.1 TABLET ORAL at 06:39

## 2022-04-27 RX ADMIN — BACLOFEN 10 MG: 10 TABLET ORAL at 20:42

## 2022-04-27 RX ADMIN — OXYCODONE HYDROCHLORIDE 10 MG: 10 TABLET ORAL at 20:41

## 2022-04-27 RX ADMIN — BACLOFEN 10 MG: 10 TABLET ORAL at 12:50

## 2022-04-27 RX ADMIN — ACETAMINOPHEN 650 MG: 325 TABLET ORAL at 11:49

## 2022-04-27 RX ADMIN — METHADONE HYDROCHLORIDE 10 MG: 10 TABLET ORAL at 08:09

## 2022-04-27 RX ADMIN — FERROUS SULFATE TAB 325 MG (65 MG ELEMENTAL FE) 325 MG: 325 (65 FE) TAB at 17:12

## 2022-04-27 RX ADMIN — LISINOPRIL 5 MG: 5 TABLET ORAL at 08:09

## 2022-04-27 RX ADMIN — DOCUSATE SODIUM 200 MG: 100 CAPSULE, LIQUID FILLED ORAL at 20:42

## 2022-04-27 RX ADMIN — FERROUS SULFATE TAB 325 MG (65 MG ELEMENTAL FE) 325 MG: 325 (65 FE) TAB at 08:10

## 2022-04-27 RX ADMIN — BACLOFEN 10 MG: 10 TABLET ORAL at 17:12

## 2022-04-27 RX ADMIN — OXYCODONE HYDROCHLORIDE 10 MG: 10 TABLET ORAL at 08:09

## 2022-04-27 RX ADMIN — POLYMYXIN B SULFATE, BACITRACIN ZINC, NEOMYCIN SULFATE: 5000; 3.5; 4 OINTMENT TOPICAL at 20:44

## 2022-04-27 RX ADMIN — BACLOFEN 10 MG: 10 TABLET ORAL at 08:10

## 2022-04-27 RX ADMIN — Medication 5000 UNITS: at 08:12

## 2022-04-27 RX ADMIN — OXYCODONE HYDROCHLORIDE 10 MG: 10 TABLET ORAL at 15:41

## 2022-04-27 RX ADMIN — ACETAMINOPHEN 650 MG: 325 TABLET ORAL at 00:26

## 2022-04-27 RX ADMIN — ENOXAPARIN SODIUM 40 MG: 100 INJECTION SUBCUTANEOUS at 08:12

## 2022-04-27 RX ADMIN — BUSPIRONE HYDROCHLORIDE 30 MG: 15 TABLET ORAL at 20:43

## 2022-04-27 ASSESSMENT — PAIN DESCRIPTION - DESCRIPTORS
DESCRIPTORS: ACHING;TENDER;THROBBING;TIGHTNESS
DESCRIPTORS: ACHING;TENDER;THROBBING;TIGHTNESS

## 2022-04-27 ASSESSMENT — PAIN DESCRIPTION - ORIENTATION
ORIENTATION: RIGHT;LEFT
ORIENTATION: RIGHT;LEFT

## 2022-04-27 ASSESSMENT — PAIN SCALES - GENERAL
PAINLEVEL_OUTOF10: 0
PAINLEVEL_OUTOF10: 8
PAINLEVEL_OUTOF10: 2
PAINLEVEL_OUTOF10: 8
PAINLEVEL_OUTOF10: 8
PAINLEVEL_OUTOF10: 3
PAINLEVEL_OUTOF10: 7
PAINLEVEL_OUTOF10: 8
PAINLEVEL_OUTOF10: 7

## 2022-04-27 ASSESSMENT — PAIN DESCRIPTION - LOCATION
LOCATION: NECK
LOCATION: NECK

## 2022-04-27 NOTE — PROGRESS NOTES
Physical Therapy  Facility/Department: [facility] ACUTE REHAB  Rehabilitation Physical Therapy Initial Assessment    NAME: Cyrus Rose  : 1960 (62 y.o.)  MRN: [id number]  CODE STATUS: Full Code    Date of Service: 22      Past Medical History:   Diagnosis Date    Anxiety     Arthritis     At maximum risk for fall 2021    caudal equina syndrome and cervical stenosis    Cancer ([address]     right breast    Cauda equina syndrome ([address] 2021    neuropathy, mobility difficulty, incontinance    Cervical stenosis of spine 2021    GERD (gastroesophageal reflux disease)     History of stomach ulcers     Hypertension     Dr. Sola Grimaldo    Hypothyroidism     Lumbar neuritis     Post laminectomy syndrome     Spinal deformity 2021    cervical and lumbar    Thyroid disease     Uses wheelchair     Wears dentures     Wellness examination     Dr. Sola Grimaldo     Past Surgical History:   Procedure Laterality Date    BACK SURGERY      lower back x 3, cervical- x 1: C4- C6, 2014     BREAST SURGERY Right     mastectomy with reconstruction    CERVICAL FUSION N/A 2022    C5 CORPECTOMY, USE OF INTRAOPERATIVE CERVICAL TRACTION performed by Hudson Hannah DO [facility] N/A 2022    POSTERIOR FIXATION C2-C7 performed by Hudson Hannah DO at [facility]  about 2018    HERNIA REPAIR Bilateral     inguinal    HYSTERECTOMY, TOTAL ABDOMINAL      LUMBAR SPINE SURGERY N/A 2021    LUMBAR LAMINECTOMY L2-S1 performed by Hudson Hannah DO at [facility] HISTORY  2022    C5 CORPECTOMY, USE OF INTRAOPERATIVE CERVICAL TRACTION (N/A Spine Cervical)        Chart Reviewed: Yes  Additional Pertinent Hx: Cyrus Rose is a 62 y.o. female with history of cauda equina syndrome s/p L2-S1 laminectomy (2021), HTN, hypothyroidism, GERD, breast cancer, and anxiety admitted to [facility] on 2022.  She presented for planned surgical intervention for cervical stenosis. She underwent C5 corpectomy with C4-C6 arthrodesis, open reduction of cervical kyphotic deformity, and C2-C7 posterior fusion on 4/12/22 (Dr. Chris Hernandez). She has a cervical collar when out of bed. After her lumbar surgery in Dec 2021, patient had been improving initially with therapy, however overall has regressed. . Pt admitted to rehab unit on 4/15/22. Family / Caregiver Present: No  Diagnosis: Nontraumatic cervical spinal cord injury secondary to cervical stenosis s/p C5 corpectomy with C4-C6 arthrodesis, open reduction of cervical kyphotic deformity, and C2-C7 posterior fusion  General Comment  Comments: 2 TRAMAINE drains present. Pt is awaiting LLE AFO order per PT [place]. Restrictions:  Restrictions/Precautions: Fall Risk;General Precautions  Required Braces or Orthoses  Cervical: c-collar (Cervical collar on while out of bed, ok to remove while eati)  Position Activity Restriction  Other position/activity restrictions: s/p C2-C7 fixation     SUBJECTIVE  Subjective: Pt is pleasant and agreeable to PT. Pt states \"My leg's are stiff\". Functional Mobility  Transfers  Sit to Stand: Stand by assistance  Stand to sit: Stand by assistance  Bed to Chair: Stand by assistance  Comment: perfromed with RW     Environmental Mobility  Ambulation  Surface: level tile  Device: Rolling Walker  Other Apparatus: Wheelchair follow  Assistance: Stand by assistance  Quality of Gait: Pt demos decrease LLE cordination, internal rotation with toe off, and decrease step height. Occassional NBOS noted.    Gait Deviations: Slow Sharon;Decreased step length;Decreased arm swing  Distance: 80ft, 180ft, and short distances within the gym   Comments: seated rest break after 80ft  More Ambulation?: No  Stairs/Curb  Stairs?: Yes      04/27/22 1400   Stairs   # Steps  13   Stairs Height   (4\"/6\")   Rails Bilateral   Device No Device   Assistance Contact guard assistance   Comment Completed in a step to pattern. PT occassionally demos NBOS however able to correct with cues. PT Exercises  Circulation/Endurance Exercises: NuStep L3 x15 minutes     ASSESSMENT       Activity Tolerance  Activity Tolerance: Patient limited by endurance; Patient tolerated treatment well    Assessment  Assessment:  pt will continue to benefit from PT to progress activity and promote safety with gait and transfers  Discharge Recommendations: Home with assist PRN;Patient would benefit from continued therapy after discharge  PT D/C Equipment  Other: Pt has rolling walker, rollator, manual wheelchair and lift chair at home  PT Equipment Recommendations  Equipment Needed: No  Other: Pt has rolling walker, rollator, manual wheelchair and lift chair at home    CLINICAL IMPRESSION    pt will continue to benefit from PT to progress activity and promote safety with gait and transfers    GOALS  Patient Goals   Patient goals : Be able to walk and do things for myself  Short Term Goals  Time Frame for Short term goals: 10 days  Short term goal 1: Pt will perform bed mobility/rolling with SBA  Short term goal 2: Pt will perform transfers with Miryam  Short term goal 3: Pt will ambulate 50 to 100 ft, with rolling walker, Miryam. Short term goal 4: Pt will perform wheelchair mobility for 150 ft, SB stright path, min A for corners. Short term goal 5: Pt able to perform 3 to 5 steps with 2 rails, min/mod A  Long Term Goals  Time Frame for Long term goals : By DC  Long term goal 1: Pt able to perform sit<>stand transfers, mod-I  Long term goal 2: Pt able to perform pivot transfers at SBA/supervsion. Long term goal 3: Pt able to ambulate with rolling walker 100 to 150 ft , level surfaces, with rolling walker, CGA.   Long term goal 4: Pt able to perform curb step with UE support at [address] term goal 5: Pt able to perform 5 to 12 steps with 2 rails at min A to improve LE strength/coordination  Long term goal 6: Pt able to propel w/c  distance of 150 ft SBA level surfaces  Long term goal 7: Pt able to ambulate on outside terraine/incline surface at min A with rolling walker  Long term goal 8: Pt able to ambulate distance of 60 to 70 ft for 2MWT to improve fucntion and gait speed. Long term goal 5: Family training for safe functional mobility for DC home    PLAN OF CARE  Frequency: 1-2 treatment sessions per day, 5-7 days per week  Plan  Current Treatment Recommendations: Strengthening;ROM;Balance training;Functional mobility training; Endurance training;ADL/Self-care training;Transfer training;Stair training;Home exercise program;Safety education & training;Patient/Caregiver education & training;Equipment evaluation, education, & procurement; Neuromuscular re-education;Gait training  Safety Devices  Type of Devices: All fall risk precautions in place;Call light within reach;Gait belt;Patient at risk for falls;Nurse notified; Left in chair  Restraints  Restraints Initially in Place: No         04/27/22 1400   PT Individual Minutes   Time In 1402   Time Out 1449   Minutes 47     Electronically signed by Emiliano Huang PTA on 4/27/22 at 3:03 PM EDT

## 2022-04-27 NOTE — PLAN OF CARE
Problem: Skin Integrity:  Goal: Will show no infection signs and symptoms  Description: Will show no infection signs and symptoms  Outcome: Progressing  Goal: Absence of new skin breakdown  Description: Absence of new skin breakdown  Outcome: Progressing     Problem: Falls - Risk of:  Goal: Will remain free from falls  Description: Will remain free from falls  Outcome: Progressing  Goal: Absence of physical injury  Description: Absence of physical injury  Outcome: Progressing     Problem: Discharge Planning:  Goal: Discharged to appropriate level of care  Description: Discharged to appropriate level of care  Outcome: Progressing     Problem: Pain:  Goal: Pain level will decrease  Description: Pain level will decrease  Outcome: Progressing  Goal: Control of acute pain  Description: Control of acute pain  Outcome: Progressing  Goal: Control of chronic pain  Description: Control of chronic pain  Outcome: Progressing     Problem: Nutrition  Goal: Optimal nutrition therapy  Outcome: Progressing     Problem: Musculor/Skeletal Functional Status  Goal: Highest potential functional level  Outcome: Progressing  Goal: Absence of falls  Outcome: Progressing     Problem: Discharge Planning  Goal: Discharge to home or other facility with appropriate resources  Outcome: Progressing     Problem: ABCDS Injury Assessment  Goal: Absence of physical injury  Outcome: Progressing

## 2022-04-27 NOTE — PROGRESS NOTES
Physical Therapy  [address]  Acute Rehabilitation Physical Therapy Progress Note    Date: 22  Patient Name: Mi Cardoso       Room: 0228/2931-69  MRN: [id number]   Account: [de-identified]   : 1960  (62 y.o.) Gender: female     Referring Practitioner: Angel Prado MD  Diagnosis: Nontraumatic cervical spinal cord injury  Past Medical History:  has a past medical history of Anxiety, Arthritis, At maximum risk for fall, Cancer ([address] Cauda equina syndrome ([address] Cervical stenosis of spine, GERD (gastroesophageal reflux disease), History of stomach ulcers, Hypertension, Hypothyroidism, Lumbar neuritis, Post laminectomy syndrome, Spinal deformity, Thyroid disease, Uses wheelchair, Wears dentures, and Wellness examination. Past Surgical History:   has a past surgical history that includes hernia repair (Bilateral); Breast surgery (Right); Mastectomy, radical; Hysterectomy, total abdominal; Lumbar spine surgery (N/A, 2021); back surgery; Colonoscopy (about ); other surgical history (2022); cervical fusion (N/A, 2022); and cervical fusion (N/A, 2022). Additional Pertinent Hx: Mi Cardoso is a 62 y.o. female with history of cauda equina syndrome s/p L2-S1 laminectomy (2021), HTN, hypothyroidism, GERD, breast cancer, and anxiety admitted to [facility] on 2022. She presented for planned surgical intervention for cervical stenosis. She underwent C5 corpectomy with C4-C6 arthrodesis, open reduction of cervical kyphotic deformity, and C2-C7 posterior fusion on 22 (Dr. Cecille Toure). She has a cervical collar when out of bed. After her lumbar surgery in Dec 2021, patient had been improving initially with therapy, however overall has regressed. . Pt admitted to rehab unit on 4/15/22.     Overall Orientation Status: Within Functional Limits  Restrictions/Precautions  Restrictions/Precautions: Fall Risk;General Precautions  Required Braces or Orthoses?: Yes (c-collar when out of bed)  Implants present? : Metal implants  Required Braces or Orthoses  Cervical: c-collar (Cervical collar on while out of bed, ok to remove while eati)  Position Activity Restriction  Other position/activity restrictions: s/p C2-C7 fixation    Subjective: Pt is pleasant and agreeable to PT. Pt states \"My leg's are stiff\". Transfers:  Sit to Stand: Stand by assistance  Stand to sit: Stand by assistance  Bed to Chair: Stand by assistance              Ambulation  Surface: level tile  Device: Rolling Walker  Assistance: Stand by assistance  Quality of Gait: Pt demos decrease LLE cordination, internal rotation with toe off, and decrease step height. Occassional NBOS noted. Gait Deviations: Slow Sharon;Decreased step length;Decreased arm swing  Distance: 100 ft x 2  AM;  short distances within gym. Comments: Patient did not require seated break. More Ambulation?: No        Stairs/Curb  Stairs?: Yes  Stairs  # Steps : 13  Stairs Height:  (4\"/6\")  Rails: Bilateral  Device: No Device  Assistance: Contact guard assistance  Comment: Completed in a step to pattern. PT occassionally demos NBOS however able to correct with cues.                  Activity Tolerance: Patient limited by endurance,Patient tolerated treatment well  PT Equipment Recommendations  Equipment Needed: No  Other: Pt has rolling walker, rollator, manual wheelchair and lift chair at home           Current Treatment Recommendations: Strengthening,ROM,Balance training,Functional mobility training,Endurance training,ADL/Self-care training,Transfer training,Stair training,Home exercise program,Safety education & training,Patient/Caregiver education & training,Equipment evaluation, education, & procurement,Neuromuscular re-education,Gait training    Conditions Requiring Skilled Therapeutic Intervention  Assessment:  pt will continue to benefit from PT to progress activity and promote safety with gait and transfers  Discharge Recommendations: Home with assist PRN;Patient would benefit from continued therapy after discharge    Goals  Short Term Goals  Time Frame for Short term goals: 10 days  Short term goal 1: Pt will perform bed mobility/rolling with SBA  Short term goal 2: Pt will perform transfers with Miryam  Short term goal 3: Pt will ambulate 50 to 100 ft, with rolling walker, Miryam. Short term goal 4: Pt will perform wheelchair mobility for 150 ft, SB stright path, min A for corners. Short term goal 5: Pt able to perform 3 to 5 steps with 2 rails, min/mod A  Long Term Goals  Time Frame for Long term goals : By DC  Long term goal 1: Pt able to perform sit<>stand transfers, mod-I  Long term goal 2: Pt able to perform pivot transfers at SBA/supervsion. Long term goal 3: Pt able to ambulate with rolling walker 100 to 150 ft , level surfaces, with rolling walker, CGA. Long term goal 4: Pt able to perform curb step with UE support at [address] term goal 5: Pt able to perform 5 to 12 steps with 2 rails at min A to improve LE strength/coordination  Long term goal 6: Pt able to propel w/c  distance of 150 ft SBA level surfaces  Long term goal 7: Pt able to ambulate on outside terraine/incline surface at min A with rolling walker  Long term goal 8: Pt able to ambulate distance of 60 to 70 ft for 2MWT to improve fucntion and gait speed.   Long term goal 5: Family training for safe functional mobility for DC home       04/27/22 0905   PT Individual Minutes   Time In 6639   Time Out 4495   Minutes 45       Electronically signed by Michelle Wasserman PTA on 4/27/22 at 5:37 PM EDT

## 2022-04-27 NOTE — PROGRESS NOTES
Occupational Therapy  Facility/Department: [facility] ACUTE REHAB  Rehabilitation Occupational Therapy Daily Treatment Note    Date: 22  Patient Name: Dionisio Enriquez       Room: 8616/3318-59  MRN: [id number]  Account: [de-identified]   : 1960  (62 y.o.) Gender: female             diagnosis:  C2-C7 fixation        Past Medical History:  has a past medical history of Anxiety, Arthritis, At maximum risk for fall, Cancer ([facility] Cauda equina syndrome ([facility] Cervical stenosis of spine, GERD (gastroesophageal reflux disease), History of stomach ulcers, Hypertension, Hypothyroidism, Lumbar neuritis, Post laminectomy syndrome, Spinal deformity, Thyroid disease, Uses wheelchair, Wears dentures, and Wellness examination. Past Surgical History:   has a past surgical history that includes hernia repair (Bilateral); Breast surgery (Right); Mastectomy, radical; Hysterectomy, total abdominal; Lumbar spine surgery (N/A, 2021); back surgery; Colonoscopy (about ); other surgical history (2022); cervical fusion (N/A, 2022); and cervical fusion (N/A, 2022). Restrictions  Restrictions/Precautions: Fall Risk;General Precautions  Other position/activity restrictions: s/p C2-C7 fixation  Required Braces or Orthoses  Cervical: c-collar  Required Braces or Orthoses?: Yes    Subjective  Subjective: \"I\"m going home Friday. \"  Restrictions/Precautions: Fall Risk;General Precautions             Objective     Orientation  Overall Orientation Status: Within Normal Limits         ADL  Feeding  Assistance Level: Modified independent  Skilled Clinical Factors: Pt states the only difficulty she is currently experiencing is with salad dressing packets - at home will have bottles  Grooming/Oral Hygiene  Assistance Level: Modified independent  Skilled Clinical Factors: w/c level at sink for denture care and hair grooming  Upper Extremity Bathing  Assistance Level: Modified independent  Madhuri Vargas [address] Level: Stand by assist  Skilled Clinical Factors: Close SBA for safety when standing for juanita-area/buttocks, no LOB observed  Upper Extremity Dressing  Assistance Level: Modified independent  Skilled Clinical Factors: after pt donned t shirt with collar on, pt removed c collar while keeping head stil and re donned to place over t shirt collar. pt obtained own set up from w/c mod indep  Lower Extremity Dressing  Equipment Provided: Reachers  Assistance Level: Modified independent  Skilled Clinical Factors: pullups and pants, mod indep over hips as well. Putting On/Taking Off Footwear  Assistance Level: Modified independent  Skilled Clinical Factors: slipper socks and shoes (elastic laces) A with teds- will not be wearing at home  Toileting  Assistance Level: Modified independent  Toilet Transfers  Technique: Stand pivot  Equipment: Grab bars;Standard toilet  Assistance Level: Stand by assist  Tub/Shower Transfers  Type: Shower  Transfer From: Wheelchair  Transfer To: Tub transfer bench  Assistance Level: Stand by assist     Functional Mobility  Device: Rolling walker  Assistance Level: Stand by assist  Skilled Clinical Factors: room ambulation 15 feet x 2 in am and again in pm.  Unsteady at times but no LOB, Good safety awareness observed, improved foot placement- less ataxic  Sit to Stand  Assistance Level: Modified independent  Stand to Sit  Assistance Level: Modified independent  Stand Pivot  Assistance Level: Stand by assist  Skilled Clinical Factors: RW   OT Exercises  Dynamic Standing Balance Exercises: 3-5 min x 3, 2 min x 2 this am, 4-5 min x 2, 2-3 min x 3 this pm  Motor Control/Coordination: despite decreased coordination, pt is able to use  her better RUE as dominant and LUE as functional assist and can accomplish all basic self care tasks. Mod indep with w/c mobility in room to obtain set up for adls. Assessment  Assessment  Activity Tolerance: Patient limited by endurance; Patient tolerated treatment well       Patient Education  Education  Education Provided: IADL Function; Energy Conservation  Education Provided Comments: advanced adls from w/c practiced this pm. pt was shown 1 hand cutting board and has website for how to obtain. pt completed all steps of place pads on cervical collar and exchange to dry collar and dry off wet pads post shower. pt demo good safety with precautions and keeps collar on at all times when out of bed.   Education Method: Demonstration;Verbal  Barriers to Learning: None  Education Outcome: Verbalized understanding;Demonstrated understanding    Plan     Continue POC       Therapy Time   Individual Concurrent Group Co-treatment   Time In 5971         Time Out 4122         Minutes 31             9:50 to 11:04 am    Monica López OT

## 2022-04-27 NOTE — PLAN OF CARE
Problem: Skin Integrity:  Goal: Will show no infection signs and symptoms  Description: Will show no infection signs and symptoms  4/27/2022 0437 by Suki Rockwell RN  Outcome: Progressing  4/27/2022 0436 by Suki Rockwell RN  Outcome: Progressing  Goal: Absence of new skin breakdown  Description: Absence of new skin breakdown  4/27/2022 0437 by Suki Rockwell RN  Outcome: Progressing  4/27/2022 0436 by Suki Rockwell RN  Outcome: Progressing     Problem: Falls - Risk of:  Goal: Will remain free from falls  Description: Will remain free from falls  4/27/2022 0437 by Suki Rockwell RN  Outcome: Progressing  4/27/2022 0436 by Suki Rockwell RN  Outcome: Progressing  Goal: Absence of physical injury  Description: Absence of physical injury  4/27/2022 0437 by Suki Rockwell RN  Outcome: Progressing  4/27/2022 0436 by Suki Rockwell RN  Outcome: Progressing     Problem: Discharge Planning:  Goal: Discharged to appropriate level of care  Description: Discharged to appropriate level of care  4/27/2022 0437 by Suki Rockwell RN  Outcome: Progressing  4/27/2022 0436 by Suki Rockwell RN  Outcome: Progressing     Problem: Pain:  Goal: Pain level will decrease  Description: Pain level will decrease  4/27/2022 0437 by Suki Rockwell RN  Outcome: Progressing  4/27/2022 0436 by Suki Rockwell RN  Outcome: Progressing  Goal: Control of acute pain  Description: Control of acute pain  4/27/2022 0437 by Suki Rockwell RN  Outcome: Progressing  4/27/2022 0436 by Suki Rockwell RN  Outcome: Progressing  Goal: Control of chronic pain  Description: Control of chronic pain  4/27/2022 0437 by Suki Rockwell RN  Outcome: Progressing  4/27/2022 0436 by Suki Rockwell RN  Outcome: Progressing     Problem: Nutrition  Goal: Optimal nutrition therapy  4/27/2022 0437 by Suki Rockwell RN  Outcome: Progressing  4/27/2022 0436 by Suki Rockwell RN  Outcome: Progressing     Problem: Musculor/Skeletal Functional Status  Goal: Highest potential functional level  4/27/2022 0437 by Humza , RN  Outcome: Progressing  4/27/2022 0436 by Humza Sharif RN  Outcome: Progressing  Goal: Absence of falls  4/27/2022 0437 by Humza Sharif RN  Outcome: Progressing  4/27/2022 0436 by Humza Shraif RN  Outcome: Progressing     Problem: Discharge Planning  Goal: Discharge to home or other facility with appropriate resources  4/27/2022 0437 by Humza Sharif RN  Outcome: Progressing  4/27/2022 0436 by Humza Sharif RN  Outcome: Progressing     Problem: ABCDS Injury Assessment  Goal: Absence of physical injury  4/27/2022 0437 by Humza Sharif RN  Outcome: Progressing  4/27/2022 0436 by Humza Sharif RN  Outcome: Progressing

## 2022-04-27 NOTE — DISCHARGE INSTR - COC
Continuity of Care Form    Patient Name: Nico Becerra   :  1960  MRN:  [id number]    Admit date:  4/15/2022  Discharge date:  ***    Code Status Order: Full Code   Advance Directives:      Admitting Physician:  Irena Peters MD  PCP: Lazara Cornelius MD    Discharging Nurse: [facility]/Room#: 8639/9188-00  Discharging Unit Phone Number: ***    Emergency Contact:   Extended Emergency Contact Information  Primary Emergency Contact: Mesfin Hamlin  Home Phone: [phone]  Relation: Spouse  Preferred language: English  Secondary Emergency Contact: [address] Phone: [phone]  Relation: Child    Past Surgical History:  Past Surgical History:   Procedure Laterality Date    BACK SURGERY      lower back x 3, cervical- x 1: C4- C6, 2014     BREAST SURGERY Right     mastectomy with reconstruction    CERVICAL FUSION N/A 2022    C5 CORPECTOMY, USE OF INTRAOPERATIVE CERVICAL TRACTION performed by Juancho May DO at [place] [address] 2022    POSTERIOR FIXATION C2-C7 performed by Juancho May DO at [address] [place] [address]  about 2018    HERNIA REPAIR Bilateral     inguinal    HYSTERECTOMY, TOTAL ABDOMINAL      LUMBAR SPINE SURGERY N/A 2021    LUMBAR LAMINECTOMY L2-S1 performed by Juancho May DO at [address] [place] [address]  2022    C5 CORPECTOMY, USE OF INTRAOPERATIVE CERVICAL TRACTION (N/A Spine Cervical)        Immunization History:   Immunization History   Administered Date(s) Administered    COVID-19, Dandre Plater, Primary or Immunocompromised, PF, 100mcg/0.5mL 2021, 2021, 12/15/2021    Influenza, MDCK Quadv, IM, PF (Flucelvax 2 yrs and older) 10/22/2021    Influenza, Quadv, IM, PF (6 mo and older Fluzone, Flulaval, Fluarix, and 3 yrs and older Afluria) 10/02/2018, 2019, 2020    Pneumococcal Polysaccharide ([id number]) 10/28/2020    Tdap (Boostrix, Adacel) 2019       Active Problems:  Patient Active Problem List   Diagnosis Code    Stenosis of cervical spine with myelopathy (HCC) M48.02, G99.2    Essential hypertension I10    Hypothyroidism E03.9    Lumbar radiculopathy, chronic M54.16    Lumbar neuritis M54.16    Post laminectomy syndrome M96.1    Hypokalemia E87.6    Pain of right hip joint M25.551    Post-menopausal osteoporosis M81.0    Chronic gastritis without bleeding K29.50    Elevated CEA R97.0    Esophageal dysphagia R13.19    Hypotension due to drugs I95.2    Ulcerative esophagitis K22.10    Weight loss, abnormal R63.4    Cervical myelopathy (HCC) G95.9    Lumbar stenosis with neurogenic claudication M48.062    Spinal deformity Q76.49    Anxiety about health F41.8    Cauda equina compression ([facility]) G83.4    Anemia, normocytic normochromic D64.9    Iron deficiency E61.1    Pseudomonas urinary tract infection N39.0, B96.5    Hypocalcemia E83.51    Cauda equina syndrome (HCC) G83.4    Hemiplegia ([facility]) G81.90    Back pain M54.9    Incomplete quadriplegia at C5-6 level ([facility]) G82.54    [place] neck deformity of cervical spine M43.8X2    Acute cystitis without hematuria N30.00    Spinal cord injury, cervical region, sequela ([facility] [address] S14.109S       Isolation/Infection:   Isolation            No Isolation          Patient Infection Status       Infection Onset Added Last Indicated Last Indicated By Review Planned Expiration Resolved Resolved By    None active    Resolved    COVID-19 (Rule Out) 04/11/22 04/11/22 04/11/22 COVID-19, Rapid (Ordered)   04/11/22 Rule-Out Test Resulted            Nurse Assessment:  Last Vital Signs: BP (!) 161/76   Pulse 89   Temp 98.1 °F (36.7 °C)   Resp 16   Ht 5' 1\" (1.549 m)   Wt 140 lb (63.5 kg)   SpO2 99%   BMI 26.45 kg/m²     Last documented pain score (0-10 scale): Pain Level: 8  Last Weight:   Wt Readings from Last 1 Encounters:   04/21/22 140 lb (63.5 kg)     Mental Status:  {IP PT MENTAL STATUS:20030}    IV Access:  {[facility] IV FWFLJS:561586227}    Nursing Mobility/ADLs:  Walking   {P DME PKBY:409800767}  Transfer  {[facility] DME LAYK:647823234}  Bathing  {[facility] DME JOVC:213290724}  Dressing  {[facility] DME HUVW:676527861}  Toileting  {[facility] DME OTXT:040330441}  Feeding  {[facility] DME STGR:446634834}  Med Admin  {[facility] DME JDNA:736856749}  Med Delivery   {[facility] MED Delivery:972199560}    Wound Care Documentation and Therapy:  Wound 04/14/22 Face Right (Active)   Wound Etiology Other 04/27/22 0915   Dressing Status Dry 04/27/22 0915   Dressing/Treatment Antibacterial ointment 04/27/22 0915   Wound Assessment Dry;Pink/red 04/27/22 0915   Drainage Amount None 04/27/22 0915   Odor None 04/27/22 0915   Rosa-wound Assessment Intact 04/27/22 0915   Number of days: 12       Wound 04/14/22 Face Left (Active)   Wound Etiology Other 04/27/22 0915   Dressing Status Dry 04/27/22 0915   Dressing/Treatment Antibacterial ointment 04/27/22 0915   Wound Assessment Dry;Pink/red 04/27/22 0915   Drainage Amount None 04/27/22 0915   Odor None 04/27/22 0915   Rosa-wound Assessment Intact 04/27/22 0915   Number of days: 12       Incision 12/30/21 Back Lower;Medial (Active)   Number of days: 118       Incision 04/12/22 Throat Right (Active)   Dressing Status Other (Comment) 04/26/22 0845   Dressing/Treatment Open to air;Surgical glue 04/27/22 0915   Closure Surgical glue 04/27/22 0915   Margins Approximated 04/27/22 0915   Incision Assessment Dry 04/27/22 0915   Drainage Amount None 04/27/22 0915   Drainage Description Serosanguinous 04/27/22 0915   Odor None 04/27/22 0915   Rosa-incision Assessment Intact;Fragile 04/27/22 0915   Number of days: 15       Incision 04/12/22 Neck Posterior (Active)   Dressing Status Other (Comment) 04/26/22 0845   Dressing/Treatment Open to air 04/27/22 0915   Closure Sutures 04/27/22 0915   Margins Approximated 04/27/22 0915   Incision Assessment Dry;Erythema 04/27/22 0915   Drainage Amount None 04/27/22 0915   Drainage Description Sanguinous 04/27/22 0915   Odor None 22 0915   Rosa-incision Assessment Ecchymosis; Intact; Other (Comment) 22 0915   Number of days: 15        Elimination:  Continence: Bowel: {YES / JM:83201}  Bladder: {YES / NW:26840}  Urinary Catheter: {Urinary Catheter:902418829}   Colostomy/Ileostomy/Ileal Conduit: {YES / DQ:26444}       Date of Last BM: ***    Intake/Output Summary (Last 24 hours) at 2022 1437  Last data filed at 2022 0720  Gross per 24 hour   Intake 600 ml   Output 600 ml   Net 0 ml     I/O last 3 completed shifts:   In: 5 [P.O.:840]  Out: -     Safety Concerns:     [employer] Safety Concerns:541946367}    Impairments/Disabilities:      [employer] Impairments/Disabilities:626068151}    Nutrition Therapy:  Current Nutrition Therapy:   [employer] Diet List:484525939}    Routes of Feeding: {[facility] DME Other Feedings:883240382}  Liquids: {Slp liquid thickness:29295}  Daily Fluid Restriction: {CHP DME Yes amt example:195251449}  Last Modified Barium Swallow with Video (Video Swallowing Test): {Done Not Done [facility]:205438826}    Treatments at the Time of Hospital Discharge:   Respiratory Treatments: ***  Oxygen Therapy:  {Therapy; copd oxygen:80083}  Ventilator:    { CC Vent XVXR:449724488}    Rehab Therapies: {THERAPEUTIC INTERVENTION:4748240282}  Weight Bearing Status/Restrictions: [employer]  Weight Bearin}  Other Medical Equipment (for information only, NOT a DME order):  {EQUIPMENT:985487780}  Other Treatments: ***    Patient's personal belongings (please select all that are sent with patient):  {[facility] DME Belongings:398556284}    RN SIGNATURE:  {Esignature:609822796}    CASE MANAGEMENT/SOCIAL WORK SECTION    Inpatient Status Date: ***    Readmission Risk Assessment Score:  Readmission Risk              Risk of Unplanned Readmission:  16           Discharging to Facility/ Agency   Name: [facility]  Phone [phone]  Fax [phone]   Address:  Phone:  Fax:    Dialysis Facility (if applicable) Name:  Address:  Dialysis Schedule:  Phone:  Fax:    / signature: Electronically signed by ROMELIA Kim LSW on 4/28/22 at 1:34 PM EDT    PHYSICIAN SECTION    Prognosis: {Prognosis:3476815262}    Condition at Discharge: Gus Jimenes Patient Condition:740532304}    Rehab Potential (if transferring to Rehab): {Prognosis:1051322469}    Recommended Labs or Other Treatments After Discharge: ***    Physician Certification: I certify the above information and transfer of Corrie Cano  is necessary for the continuing treatment of the diagnosis listed and that she requires {Admit to Appropriate Level of Care:08824} for {GREATER/LESS:862089102} 30 days.      Update Admission H&P: {CHP DME Changes in WRPTY:947377500}    PHYSICIAN SIGNATURE:  {Esignature:212993329}

## 2022-04-27 NOTE — PLAN OF CARE
Problem: Skin Integrity:  Goal: Will show no infection signs and symptoms  Description: Will show no infection signs and symptoms  4/27/2022 1128 by Ramon Borges  Outcome: Progressing     Problem: Skin Integrity:  Goal: Absence of new skin breakdown  Description: Absence of new skin breakdown  4/27/2022 1128 by Ramon Borges  Outcome: Progressing     Problem: Falls - Risk of:  Goal: Will remain free from falls  Description: Will remain free from falls  4/27/2022 1128 by Ramon Borges  Outcome: Progressing     Problem: Falls - Risk of:  Goal: Absence of physical injury  Description: Absence of physical injury  4/27/2022 1128 by Ramon Borges  Outcome: Progressing     Problem: Pain:  Goal: Pain level will decrease  Description: Pain level will decrease  4/27/2022 1128 by Ramon Borges  Outcome: Progressing     Problem: Pain:  Goal: Control of acute pain  Description: Control of acute pain  4/27/2022 1128 by Ramon Borges  Outcome: Progressing     Problem: Musculor/Skeletal Functional Status  Goal: Highest potential functional level  4/27/2022 1128 by Ramon Borges  Outcome: Progressing     Problem: ABCDS Injury Assessment  Goal: Absence of physical injury  4/27/2022 1128 by Ramon Borges  Outcome: Progressing

## 2022-04-27 NOTE — PROGRESS NOTES
Physical Medicine & Rehabilitation  Progress Note      Subjective:      62year-old female with nontraumatic cervical spinal cord injury secondary to servical stenosis. Patient is doing well again today. She notes some issues with sinus drainage but is hesitant to use a medication that would affect her blood pressure. No new issues with pain, sleep, appetite, bowel, or bladder. ROS:  Denies fevers, chills, sweats. No chest pain, palpitations, lightheadedness. Denies coughing, wheezing or shortness of breath. Denies abdominal pain, nausea, diarrhea   No new areas of joint pain. Denies new areas of numbness or weakness. Denies new anxiety or depression issues. No new skin problems. Rehabilitation:   Progressing in therapies. PT:  Restrictions/Precautions: Fall Risk,General Precautions  Implants present? : Metal implants  Other position/activity restrictions: s/p C2-C7 fixation  Required Braces or Orthoses  Cervical: c-collar (on when OOB)   Transfers  Sit to Stand: Stand by assistance,Contact guard assistance  Stand to sit: Stand by assistance,Contact guard assistance  Bed to Chair: Moderate assistance  Comment: Cues for errect posture, decreased coordination noted B LE L LE> R LE. Transfers completed with RW. No LOB noted. Ambulation  Surface: level tile  Device: Rolling Walker  Other Apparatus: Wheelchair follow  Assistance: Contact guard assistance (SBA for shorter distances and CGA for longer distances )  Quality of Gait: Pt demos decrease LLE cordination, internal rotation with toe off, and decrease step height. Occassional NBOS noted.    Gait Deviations: Slow Sharon,Decreased step length,Decreased arm swing  Distance: 100'x2 and 30'x2  Comments: Seated rest breaks at 100'   More Ambulation?: No    Transfers  Sit to Stand: Stand by assistance,Contact guard assistance  Stand to sit: Stand by assistance,Contact guard assistance  Bed to Chair: Moderate assistance  Comment: Cues for errect posture, decreased coordination noted B LE L LE> R LE. Transfers completed with RW. No LOB noted. Ambulation  Surface: level tile  Device: Rolling Walker  Other Apparatus: Wheelchair follow  Assistance: Contact guard assistance (SBA for shorter distances and CGA for longer distances )  Quality of Gait: Pt demos decrease LLE cordination, internal rotation with toe off, and decrease step height. Occassional NBOS noted. Gait Deviations: Slow Sharon,Decreased step length,Decreased arm swing  Distance: 100'x2 and 30'x2  Comments: Seated rest breaks at 100'   More Ambulation?: No    Surface: level tile  Ambulation  Surface: level tile  Device: Rolling Walker  Other Apparatus: Wheelchair follow  Assistance: Contact guard assistance (SBA for shorter distances and CGA for longer distances )  Quality of Gait: Pt demos decrease LLE cordination, internal rotation with toe off, and decrease step height. Occassional NBOS noted. Gait Deviations: Slow Sharon,Decreased step length,Decreased arm swing  Distance: 100'x2 and 30'x2  Comments: Seated rest breaks at 100'   More Ambulation?: No    OT:  ADL  Equipment Provided: Sock aid,Long-handled sponge,Reacher  Feeding: Setup  Grooming: Minimal assistance  UE Bathing: Stand by assistance  LE Bathing: Minimal assistance  UE Dressing: Verbal cueing,Minimal assistance (standing during juanita care )  LE Dressing: Minimal assistance  Toileting: Minimal assistance  Additional Comments: Pt completed full shower this date. Seated on tub bench, utilizing hand held shower head and long handled sponge. Pt nust leav on c-collar during shower, changing to spare after shower is complete.            Balance  Sitting Balance: Stand by assistance  Standing Balance: Minimal assistance   Standing Balance  Time: 1-2 min x3   Activity: transfers ,ADL activites   Comment: 1-2 UE support  Functional Mobility  Functional - Mobility Device: Wheelchair  Activity: To/from bathroom  Assist Level: Dependent/Total  Functional Mobility Comments: did not self propel      Bed mobility  Bridging:  (not indicated at this time)  Rolling to Left: Moderate assistance  Rolling to Right: Minimal assistance  Supine to Sit: Stand by assistance  Sit to Supine: Stand by assistance  Scooting: Stand by assistance  Comment: PT mat, wedge, 3 pillows. Transfers  Stand Step Transfers: Minimal assistance  Stand Pivot Transfers: Minimal assistance  Sit to stand: Minimal assistance  Stand to sit: Minimal assistance  Transfer Comments: Pt min A for transfers this date with verbal cues required for hand/foot placement during transfers for safety. Toilet Transfers  Toilet - Technique: Stand pivot,To left,To right  Equipment Used: Grab bars  Toilet Transfer: Minimal assistance  Toilet Transfers Comments: Verbal cues for hand placement and safety. L side weakness with LLE buckling. Shower Transfers  Shower - Transfer From: Wheelchair  Shower - Transfer Type: To and From  Shower - Transfer To: Transfer tub bench  Shower - Technique: Stand pivot,To right,To left  Shower Transfers: Contact Guard  Shower Transfers Comments: cues for safety   Wheelchair Bed Transfers  Wheelchair/Bed - Technique: Stand pivot  Equipment Used: Bed,Wheelchair  Level of Asssistance: Minimal assistance  Wheelchair Transfers Comments: Verbal cues for hand placement and safety. L side weakness with LLE buckling. SPEECH:      Objective:  BP (!) 161/76   Pulse 89   Temp 98.1 °F (36.7 °C)   Resp 16   Ht 5' 1\" (1.549 m)   Wt 140 lb (63.5 kg)   SpO2 99%   BMI 26.45 kg/m²       GEN: Well developed, well nourished, in NAD  HEENT:  NCAT. PERRL. EOMI. Mucous membranes pink and moist. Hard cervical collar in place  PULM:  Clear to ausculation. No rales or rhonchi. Respirations WNL and unlabored. CV:  Regular rate rhythm. No murmurs or gallops. GI:  Abdomen soft. Tender, distended. BS + and equal.    NEUROLOGICAL: A&O x3.  Sensation intact to light touch. .   MSK:  Functional ROM BUE and BLEs. Motor testing 4/5 key muscles BUEs, 4-/5 B hip flexion, 4-/5 R knee extension, 4-/5 L knee extension, 4-/5 R dorsiflexion, 3/5 L dorsiflexion. SKIN: Warm dry and intact. Good turgor. Posterior cervical spine incision well approximated with some erythema laterally to incision itself. No induration or drainage. EXTREMITIES:  No calf tenderness to palpation. No edema BLEs. PSYCH: Mood WNL. Appropriately interactive. Affect WNL. Diagnostics:     CBC:   No results for input(s): WBC, RBC, HGB, HCT, MCV, RDW, PLT in the last 72 hours. BMP:   No results for input(s): NA, K, CL, CO2, PHOS, BUN, CREATININE, CA, GLUCOSE in the last 72 hours. BNP: No results for input(s): BNP in the last 72 hours. PT/INR: No results for input(s): PROTIME, INR in the last 72 hours. APTT: No results for input(s): APTT in the last 72 hours. CARDIAC ENZYMES: No results for input(s): CKMB, CKMBINDEX, TROPONINT in the last 72 hours. Invalid input(s): CKTOTAL;3 troponins   FASTING LIPID PANEL:  Lab Results   Component Value Date    CHOL 198 07/23/2020     (A) 07/23/2020    TRIG 96 07/23/2020     LIVER PROFILE: No results for input(s): AST, ALT, ALB, BILIDIR, BILITOT, ALKPHOS in the last 72 hours.      Current Medications:   Current Facility-Administered Medications: bisacodyl (DULCOLAX) suppository 10 mg, 10 mg, Rectal, Q48H  levothyroxine (SYNTHROID) tablet 100 mcg, 100 mcg, Oral, Daily  vitamin D3 (CHOLECALCIFEROL) tablet 5,000 Units, 5,000 Units, Oral, Weekly  baclofen (LIORESAL) tablet 10 mg, 10 mg, Oral, 4x Daily  lisinopril (PRINIVIL;ZESTRIL) tablet 5 mg, 5 mg, Oral, Daily  polyethylene glycol (GLYCOLAX) packet 17 g, 17 g, Oral, Daily  senna (SENOKOT) tablet 17.2 mg, 2 tablet, Oral, Daily PRN  acetaminophen (TYLENOL) tablet 650 mg, 650 mg, Oral, Q6H  magnesium hydroxide (MILK OF MAGNESIA) 400 MG/5ML suspension 30 mL, 30 mL, Oral, Daily PRN  ondansetron (ZOFRAN-ODT) disintegrating tablet 4 mg, 4 mg, Oral, Q8H PRN **OR** [DISCONTINUED] ondansetron (ZOFRAN) injection 4 mg, 4 mg, IntraVENous, Q6H PRN  oxyCODONE (ROXICODONE) immediate release tablet 5 mg, 5 mg, Oral, Q4H PRN **OR** oxyCODONE HCl (OXY-IR) immediate release tablet 10 mg, 10 mg, Oral, Q4H PRN  busPIRone (BUSPAR) tablet 30 mg, 30 mg, Oral, BID  dilTIAZem (CARDIZEM CD) extended release capsule 180 mg, 180 mg, Oral, Daily  docusate sodium (COLACE) capsule 200 mg, 200 mg, Oral, BID  enoxaparin (LOVENOX) injection 40 mg, 40 mg, SubCUTAneous, Daily  ferrous sulfate (IRON 325) tablet 325 mg, 325 mg, Oral, BID WC  methadone (DOLOPHINE) tablet 10 mg, 10 mg, Oral, BID  neomycin-bacitracin-polymyxin (NEOSPORIN) ointment, , Topical, BID  pantoprazole (PROTONIX) tablet 40 mg, 40 mg, Oral, Daily      Impression/Plan:   Impaired ADLs, gait, and mobility due to:      1. Nontraumatic cervical spinal cord injury secondary to cervical stenosis: s/p C5 corpectomy, C4-6 arthrodesis, open reduction cervical kyphosis, C2-7 posterior fusion performed by Dr. Ricky Stewart on 4/12/2022. PT/OT for gait, mobility, strengthening, endurance, ADLs, and self care. Has Tylenol prn, methadone BID, robaxin prn, oxycodone prn for pain. She treats with [facility] for methadone and will require outpatient follow up with them at discharge and for methadone outpatient prescription. 2. Muscle spasms: resolved after discontinuing Robaxin and changed to routine baclofen. Dose increased 4/18. 3. Hx cauda equina syndrome: s/p L2-S1 laminectomy (December 2021)  4. L foot drop: therapy treating. Orthotist measured for L AFO. 5. HTN: on Diltiazem. 6. Anemia: Hb low but improving. On ferrous sulfate. Monitoring. 7. Hypothyroidism: on levothyroxine. TSH 16, free T4 low normal - IM increased levothyroxine dose to 100 mcg daily 4/24.   8. Hyponatremia: improving. Monitoring. Medical management by IM  9. GERD: on pantoprazole.    10. Anxiety:on buspar. 11. Allergic rhinitis: will review with pharmacy whether Flonase can be used  12. Vitamin D deficiency: on repletion weekly  13. Hx breast cancer  14. Bowel Management/Neurogenic bowel: Miralax daily, senokot prn. Will change Dulcolax to q other day after therapies completed. 15. DVT Prophylaxis:  low molecular weight heparin, SCD's while in bed and HERMINIO's   16. Internal medicine for medical management      Electronically signed by Yokasta Neil MD on 4/27/2022 at 9:59 AM      This note is created with the assistance of a speech recognition program.  While intending to generate a document that actually reflects the content of the visit, the document can still have some errors including those of syntax and sound a like substitutions which may escape proof reading. In such instances, actual meaning can be extrapolated by contextual diversion.

## 2022-04-28 PROCEDURE — 6370000000 HC RX 637 (ALT 250 FOR IP): Performed by: PHYSICAL MEDICINE & REHABILITATION

## 2022-04-28 PROCEDURE — 97542 WHEELCHAIR MNGMENT TRAINING: CPT

## 2022-04-28 PROCEDURE — 97535 SELF CARE MNGMENT TRAINING: CPT

## 2022-04-28 PROCEDURE — 6370000000 HC RX 637 (ALT 250 FOR IP): Performed by: REGISTERED NURSE

## 2022-04-28 PROCEDURE — 97530 THERAPEUTIC ACTIVITIES: CPT

## 2022-04-28 PROCEDURE — 99231 SBSQ HOSP IP/OBS SF/LOW 25: CPT | Performed by: INTERNAL MEDICINE

## 2022-04-28 PROCEDURE — 6370000000 HC RX 637 (ALT 250 FOR IP): Performed by: INTERNAL MEDICINE

## 2022-04-28 PROCEDURE — 6360000002 HC RX W HCPCS: Performed by: REGISTERED NURSE

## 2022-04-28 PROCEDURE — 97110 THERAPEUTIC EXERCISES: CPT

## 2022-04-28 PROCEDURE — 97116 GAIT TRAINING THERAPY: CPT

## 2022-04-28 PROCEDURE — 1180000000 HC REHAB R&B

## 2022-04-28 PROCEDURE — 99232 SBSQ HOSP IP/OBS MODERATE 35: CPT | Performed by: PHYSICAL MEDICINE & REHABILITATION

## 2022-04-28 RX ORDER — SENNA PLUS 8.6 MG/1
2 TABLET ORAL DAILY PRN
Qty: 60 TABLET | Refills: 1 | Status: SHIPPED | OUTPATIENT
Start: 2022-04-28 | End: 2022-05-28

## 2022-04-28 RX ORDER — HYDROCODONE BITARTRATE AND ACETAMINOPHEN 7.5; 325 MG/1; MG/1
1 TABLET ORAL EVERY 8 HOURS PRN
Qty: 21 TABLET | Refills: 0 | Status: SHIPPED | OUTPATIENT
Start: 2022-04-28 | End: 2022-05-05

## 2022-04-28 RX ORDER — LEVOTHYROXINE SODIUM 0.1 MG/1
100 TABLET ORAL DAILY
Qty: 30 TABLET | Refills: 3 | Status: SHIPPED | OUTPATIENT
Start: 2022-04-29 | End: 2022-08-29 | Stop reason: SDUPTHER

## 2022-04-28 RX ORDER — BACLOFEN 10 MG/1
10 TABLET ORAL 4 TIMES DAILY
Qty: 120 TABLET | Refills: 1 | Status: SHIPPED | OUTPATIENT
Start: 2022-04-28

## 2022-04-28 RX ORDER — BISACODYL 10 MG
10 SUPPOSITORY, RECTAL RECTAL
Qty: 15 SUPPOSITORY | Refills: 0 | Status: SHIPPED | OUTPATIENT
Start: 2022-04-29 | End: 2022-05-29

## 2022-04-28 RX ORDER — FLUTICASONE PROPIONATE 50 MCG
1 SPRAY, SUSPENSION (ML) NASAL DAILY
Status: DISCONTINUED | OUTPATIENT
Start: 2022-04-28 | End: 2022-04-29 | Stop reason: HOSPADM

## 2022-04-28 RX ORDER — BACITRACIN, NEOMYCIN, POLYMYXIN B 400; 3.5; 5 [USP'U]/G; MG/G; [USP'U]/G
OINTMENT TOPICAL
COMMUNITY
Start: 2022-04-28 | End: 2022-05-08

## 2022-04-28 RX ORDER — FLUTICASONE PROPIONATE 50 MCG
1 SPRAY, SUSPENSION (ML) NASAL DAILY
Qty: 16 G | Refills: 3 | COMMUNITY
Start: 2022-04-29 | End: 2022-10-17

## 2022-04-28 RX ORDER — LISINOPRIL 5 MG/1
5 TABLET ORAL DAILY
Qty: 30 TABLET | Refills: 3 | Status: SHIPPED | OUTPATIENT
Start: 2022-04-29 | End: 2022-08-29 | Stop reason: SDUPTHER

## 2022-04-28 RX ORDER — DILTIAZEM HYDROCHLORIDE 180 MG/1
180 CAPSULE, COATED, EXTENDED RELEASE ORAL DAILY
Qty: 30 CAPSULE | Refills: 3 | Status: SHIPPED | OUTPATIENT
Start: 2022-04-29 | End: 2022-10-31 | Stop reason: SDUPTHER

## 2022-04-28 RX ORDER — PANTOPRAZOLE SODIUM 40 MG/1
40 TABLET, DELAYED RELEASE ORAL DAILY
Qty: 30 TABLET | Refills: 3 | Status: SHIPPED | OUTPATIENT
Start: 2022-04-29

## 2022-04-28 RX ORDER — DOCUSATE SODIUM 100 MG/1
200 CAPSULE, LIQUID FILLED ORAL 2 TIMES DAILY
Qty: 60 CAPSULE | Refills: 1 | Status: SHIPPED | OUTPATIENT
Start: 2022-04-28

## 2022-04-28 RX ADMIN — POLYMYXIN B SULFATE, BACITRACIN ZINC, NEOMYCIN SULFATE: 5000; 3.5; 4 OINTMENT TOPICAL at 22:03

## 2022-04-28 RX ADMIN — ENOXAPARIN SODIUM 40 MG: 100 INJECTION SUBCUTANEOUS at 07:44

## 2022-04-28 RX ADMIN — FLUTICASONE PROPIONATE 1 SPRAY: 50 SPRAY, METERED NASAL at 12:43

## 2022-04-28 RX ADMIN — OXYCODONE HYDROCHLORIDE 10 MG: 10 TABLET ORAL at 19:47

## 2022-04-28 RX ADMIN — FERROUS SULFATE TAB 325 MG (65 MG ELEMENTAL FE) 325 MG: 325 (65 FE) TAB at 06:02

## 2022-04-28 RX ADMIN — LISINOPRIL 5 MG: 5 TABLET ORAL at 07:44

## 2022-04-28 RX ADMIN — PANTOPRAZOLE SODIUM 40 MG: 40 TABLET, DELAYED RELEASE ORAL at 06:02

## 2022-04-28 RX ADMIN — POLYETHYLENE GLYCOL 3350 17 G: 17 POWDER, FOR SOLUTION ORAL at 07:44

## 2022-04-28 RX ADMIN — DOCUSATE SODIUM 200 MG: 100 CAPSULE, LIQUID FILLED ORAL at 22:03

## 2022-04-28 RX ADMIN — LEVOTHYROXINE SODIUM 100 MCG: 0.1 TABLET ORAL at 06:02

## 2022-04-28 RX ADMIN — ACETAMINOPHEN 650 MG: 325 TABLET ORAL at 22:03

## 2022-04-28 RX ADMIN — BACLOFEN 10 MG: 10 TABLET ORAL at 22:03

## 2022-04-28 RX ADMIN — ACETAMINOPHEN 650 MG: 325 TABLET ORAL at 06:03

## 2022-04-28 RX ADMIN — OXYCODONE HYDROCHLORIDE 10 MG: 10 TABLET ORAL at 06:03

## 2022-04-28 RX ADMIN — BUSPIRONE HYDROCHLORIDE 30 MG: 15 TABLET ORAL at 22:03

## 2022-04-28 RX ADMIN — BACLOFEN 10 MG: 10 TABLET ORAL at 07:44

## 2022-04-28 RX ADMIN — BACLOFEN 10 MG: 10 TABLET ORAL at 12:37

## 2022-04-28 RX ADMIN — BUSPIRONE HYDROCHLORIDE 30 MG: 15 TABLET ORAL at 07:45

## 2022-04-28 RX ADMIN — ACETAMINOPHEN 650 MG: 325 TABLET ORAL at 18:27

## 2022-04-28 RX ADMIN — ACETAMINOPHEN 650 MG: 325 TABLET ORAL at 12:37

## 2022-04-28 RX ADMIN — DOCUSATE SODIUM 200 MG: 100 CAPSULE, LIQUID FILLED ORAL at 07:44

## 2022-04-28 RX ADMIN — FERROUS SULFATE TAB 325 MG (65 MG ELEMENTAL FE) 325 MG: 325 (65 FE) TAB at 18:25

## 2022-04-28 RX ADMIN — DILTIAZEM HYDROCHLORIDE 180 MG: 180 CAPSULE, COATED, EXTENDED RELEASE ORAL at 07:47

## 2022-04-28 RX ADMIN — BACLOFEN 10 MG: 10 TABLET ORAL at 18:25

## 2022-04-28 RX ADMIN — METHADONE HYDROCHLORIDE 10 MG: 10 TABLET ORAL at 07:44

## 2022-04-28 RX ADMIN — OXYCODONE HYDROCHLORIDE 10 MG: 10 TABLET ORAL at 12:38

## 2022-04-28 RX ADMIN — METHADONE HYDROCHLORIDE 10 MG: 10 TABLET ORAL at 22:03

## 2022-04-28 ASSESSMENT — PAIN SCALES - GENERAL
PAINLEVEL_OUTOF10: 7
PAINLEVEL_OUTOF10: 7
PAINLEVEL_OUTOF10: 8
PAINLEVEL_OUTOF10: 6
PAINLEVEL_OUTOF10: 6
PAINLEVEL_OUTOF10: 9
PAINLEVEL_OUTOF10: 9

## 2022-04-28 ASSESSMENT — PAIN DESCRIPTION - DESCRIPTORS
DESCRIPTORS: ACHING;DULL
DESCRIPTORS: ACHING
DESCRIPTORS: ACHING;BURNING;CRAMPING
DESCRIPTORS: ACHING
DESCRIPTORS: ACHING

## 2022-04-28 ASSESSMENT — PAIN DESCRIPTION - ORIENTATION
ORIENTATION: LEFT;RIGHT;LOWER
ORIENTATION: POSTERIOR
ORIENTATION: LOWER;POSTERIOR

## 2022-04-28 ASSESSMENT — PAIN DESCRIPTION - LOCATION
LOCATION: NECK
LOCATION: NECK
LOCATION: LEG;NECK
LOCATION: NECK
LOCATION: NECK
LOCATION: BACK;NECK

## 2022-04-28 ASSESSMENT — PAIN DESCRIPTION - ONSET: ONSET: ON-GOING

## 2022-04-28 ASSESSMENT — PAIN DESCRIPTION - PAIN TYPE: TYPE: SURGICAL PAIN

## 2022-04-28 ASSESSMENT — PAIN DESCRIPTION - FREQUENCY: FREQUENCY: CONTINUOUS

## 2022-04-28 NOTE — PROGRESS NOTES
Physical Therapy  Facility/Department: [facility] ACUTE REHAB  Rehabilitation Physical Therapy Daily Treatment Note    NAME: Kennedi Nunez  : 1960 (62 y.o.)  MRN: [id number]  CODE STATUS: Full Code    Date of Service: 22    Chart Reviewed: Yes  Patient assessed for rehabilitation services?: Yes  Additional Pertinent Hx: Kennedi Nunez is a 62 y.o. female with history of cauda equina syndrome s/p L2-S1 laminectomy (2021), HTN, hypothyroidism, GERD, breast cancer, and anxiety admitted to [facility] on 2022. She presented for planned surgical intervention for cervical stenosis. She underwent C5 corpectomy with C4-C6 arthrodesis, open reduction of cervical kyphotic deformity, and C2-C7 posterior fusion on 22 (Dr. Radha Alaniz). She has a cervical collar when out of bed. After her lumbar surgery in Dec 2021, patient had been improving initially with therapy, however overall has regressed. . Pt admitted to rehab unit on 4/15/22. Family / Caregiver Present: No  Referral Date : 04/15/22  Diagnosis: Nontraumatic cervical spinal cord injury secondary to cervical stenosis s/p C5 corpectomy with C4-C6 arthrodesis, open reduction of cervical kyphotic deformity, and C2-C7 posterior fusion  General Comment  Comments: Pt is awaiting LLE AFO order per PT [place]. Restrictions:  Restrictions/Precautions: Fall Risk;General Precautions  Required Braces or Orthoses  Cervical: c-collar  Position Activity Restriction  Other position/activity restrictions: s/p C2-C7 fixation     SUBJECTIVE  Subjective: Pt is pleasant and agreeable to PT. Pt states \"My leg's are stiff\". Pt reports \"7/10\" pain this date. Pt states she will ask her family to come tomorrow morning for family training.    Pain: Reports continued pain through R side of neck/shoulder         OBJECTIVE  Vision  Vision Exceptions: Wears glasses for reading    Hearing  Hearing: Within functional limits    Cognition  Overall Cognitive Status: WNL    Sensation  Overall Sensation Status: Impaired (Bilateral hand and feet; pt reports improvement in sensation)    Functional Mobility  Bed mobility  Rolling to Left: Stand by assistance  Rolling to Right: Minimal assistance  Supine to Sit: Stand by assistance  Sit to Supine: Stand by assistance  Scooting: Stand by assistance  Comment: \"I mainly sleep in my recliner\". Pt is able to complete bed mob on PT mat with wedge and 2 pillows. Transfers  Sit to Stand: Stand by assistance  Stand to sit: Stand by assistance  Bed to Chair: Stand by assistance  Comment: Completed with RW. Good hand placement noted. Balance  Posture: Fair  Sitting - Static: Good;-  Sitting - Dynamic: Fair  Standing - Static: Fair  Standing - Dynamic: Poor;+  Comments: Standing with rolling walker. Environmental Mobility  Ambulation  Surface: level tile  Device: Rolling Walker  Assistance: Stand by assistance;Contact guard assistance (SBA for shorter distances and CGA for longer distances )  Quality of Gait: Pt demos decrease LLE cordination, internal rotation with toe off, and decrease step height. Occassional NBOS noted. Gait Deviations: Slow Sharon;Decreased step length;Decreased arm swing  Distance: 2MWT: 80' and finished amb totaling 208' in 5 min and 12 sec. Comments: Standing rest breaks PRN. More Ambulation?: No  Ambulation 2  Surface - 2: level tile  Device 2: Rolling Walker  Assistance 2: Contact guard assistance (SBA for shorter distances and CGA for longer distances )  Quality of Gait 2: same as above  Gait Deviations: Slow Sharon;Decreased step height  Distance: 100'x2  Comments: STanding rest breaks PRN. Stairs/Curb  Stairs?: Yes    PT Exercises  A/AROM Exercises: Seated BLE exs x 20, 2# on RLE, AAROM on LLE  Resistive Exercises: Seated orange band  x 20 reps  Circulation/Endurance Exercises: NuStep L3 x10 minutes   Disease-specific Exercises: Edu on HEP with good understanding at this time.  All questions answered. ASSESSMENT  Vitals  O2 Device: None (Room air)    Activity Tolerance  Activity Tolerance: Patient tolerated treatment well    Assessment  Assessment:  pt will continue to benefit from PT to progress activity and promote safety with gait and transfers  Treatment Diagnosis: B LE/B UE weakness, difficulty walking  Therapy Prognosis: Good  Decision Making: High Complexity  Barriers to Learning: none  Discharge Recommendations: Home with assist PRN;Patient would benefit from continued therapy after discharge  PT D/C Equipment  Other: Pt has rolling walker, rollator, manual wheelchair and lift chair at home  PT Equipment Recommendations  Equipment Needed: No  Other: Pt has rolling walker, rollator, manual wheelchair and lift chair at home    CLINICAL IMPRESSION    pt will continue to benefit from PT to progress activity and promote safety with gait and transfers    GOALS  Patient Goals   Patient goals : Be able to walk and do things for myself  Short Term Goals  Time Frame for Short term goals: 10 days  Short term goal 1: Pt will perform bed mobility/rolling with SBA  Short term goal 2: Pt will perform transfers with Miryam  Short term goal 3: Pt will ambulate 50 to 100 ft, with rolling walker, Miryam. Short term goal 4: Pt will perform wheelchair mobility for 150 ft, SB stright path, min A for corners. Short term goal 5: Pt able to perform 3 to 5 steps with 2 rails, min/mod A  Long Term Goals  Time Frame for Long term goals : By DC  Long term goal 1: Pt able to perform sit<>stand transfers, mod-I  Long term goal 2: Pt able to perform pivot transfers at SBA/supervsion. Long term goal 3: Pt able to ambulate with rolling walker 100 to 150 ft , level surfaces, with rolling walker, CGA.   Long term goal 4: Pt able to perform curb step with UE support at [address] term goal 5: Pt able to perform 5 to 12 steps with 2 rails at min A to improve LE strength/coordination  Long term goal 6: Pt able to propel w/c distance of 150 ft SBA level surfaces  Long term goal 7: Pt able to ambulate on outside terraine/incline surface at min A with rolling walker  Long term goal 8: Pt able to ambulate distance of 60 to 70 ft for 2MWT to improve fucntion and gait speed. Long term goal 5: Family training for safe functional mobility for DC home    PLAN OF CARE  Frequency:  minutes per day, 5-7days per wk. Plan  Current Treatment Recommendations: Strengthening;ROM;Balance training;Functional mobility training; Endurance training;ADL/Self-care training;Transfer training;Stair training;Home exercise program;Safety education & training;Patient/Caregiver education & training;Equipment evaluation, education, & procurement; Neuromuscular re-education;Gait training  Safety Devices  Type of Devices: All fall risk precautions in place;Call light within reach;Gait belt;Patient at risk for falls;Nurse notified; Left in chair  Restraints  Restraints Initially in Place: No    Therapy Time         04/28/22 0903 04/28/22 1436   PT Individual Minutes   Time In 0903 1436   Time Out 1003 1506   Minutes 60 [address] [place], [place], 04/28/22 at 3:41 PM

## 2022-04-28 NOTE — PROGRESS NOTES
Occupational Therapy  Facility/Department: [facility] ACUTE REHAB  Rehabilitation Occupational Therapy Daily Treatment Note    Date: 22  Patient Name: Kennedi Nunez       Room: [facility]/9245-75  MRN: [id number]  Account: [de-identified]   : 1960  (62 y.o.) Gender: female                    Past Medical History:  has a past medical history of Anxiety, Arthritis, At maximum risk for fall, Cancer ([facility] [address] Cauda equina syndrome ([facility] [address] Cervical stenosis of spine, GERD (gastroesophageal reflux disease), History of stomach ulcers, Hypertension, Hypothyroidism, Lumbar neuritis, Post laminectomy syndrome, Spinal deformity, Thyroid disease, Uses wheelchair, Wears dentures, and Wellness examination. Past Surgical History:   has a past surgical history that includes hernia repair (Bilateral); Breast surgery (Right); Mastectomy, radical; Hysterectomy, total abdominal; Lumbar spine surgery (N/A, 2021); back surgery; Colonoscopy (about ); other surgical history (2022); cervical fusion (N/A, 2022); and cervical fusion (N/A, 2022). Restrictions  Restrictions/Precautions: Fall Risk;General Precautions  Implants present? : Metal implants  Other position/activity restrictions: s/p C2-C7 fixation  Required Braces or Orthoses  Cervical: c-collar  Required Braces or Orthoses?: Yes    Subjective  Subjective: \"Well at home I woudn't shower without my  there to help me\". Restrictions/Precautions: Fall Risk;General Precautions        Pain Assessment  Pain Assessment: None - Denies Pain    Objective     Cognition  Overall Cognitive Status: WNL  Orientation  Overall Orientation Status: Within Normal Limits         ADL  Feeding  Assistance Level: Set-up  Skilled Clinical Factors: Occasional A opening packets. Per pt report  Grooming/Oral Hygiene  Assistance Level: Supervision  Skilled Clinical Factors: SUP standing at sink to remove dentires. Mod I w/c level after shower to ronny dentures and brush hair.    Upper Extremity Bathing  Assistance Level: Modified independent  Skilled Clinical Factors: Completed in shower seated on bench with [facility] shower head  Lower Extremity Bathing  Equipment Provided: Long-handled sponge  Assistance Level: Stand by assist  Skilled Clinical Factors: Close SBA for safety when standing for juanita-area/buttocks, no LOB observed  Upper Extremity Dressing  Assistance Level: Contact guard assist  Skilled Clinical Factors: A managing t-shirt over c-collar  Lower Extremity Dressing  Equipment Provided: Reachers  Assistance Level: Modified independent  Skilled Clinical Factors: pullups and pants, mod indep over hips as well. Putting On/Taking Off Footwear  Equipment Provided:  (elastic shoelaces)  Assistance Level: Modified independent  Skilled Clinical Factors: slipper socks and shoes (elastic laces) A with teds- will not be wearing at home  Toileting  Assistance Level: Supervision  Toilet Transfers  Technique:  (ambulating with RW)  Equipment: Grab bars;Standard toilet  Assistance Level: Stand by assist  Skilled Clinical Factors: slighlty unsteady. no LOB  Tub/Shower Transfers  Type: Shower  Transfer From: Wheelchair  Transfer To: Tub transfer bench  Additional Factors: Verbal cues  Assistance Level: Stand by assist  Skilled Clinical Factors: Pt ambulates into shower with SBA and good self correction for sequencing. Stand pivot to exit shower to simulate at home setup. Functional Mobility  Device: Rolling walker  Activity: To/From bathroom  Assistance Level: Stand by assist  Skilled Clinical Factors: Unsteady at times but no LOB, Good safety awareness observed,   Bed Mobility  Overall Assistance Level: Modified Independent  Additional Factors: With handrails; Increased time to complete  Supine to Sit  Assistance Level: Modified independent  Skilled Clinical Factors: Increased time, used railing  Scooting  Assistance Level:  Independent  Sit to Stand  Assistance Level: Modified independent  Skilled Clinical Factors: No cuing required this date, self-corrected hand placement  Stand to Sit  Assistance Level: Modified independent  Skilled Clinical Factors: No cuing required this date, self-corrected hand placement         Assessment  Assessment  Activity Tolerance: Patient tolerated treatment well  Discharge Recommendations: Home with assist PRN;Patient would benefit from continued therapy after discharge  OT Equipment Recommendations  Equipment Needed: Yes  Mobility Devices: ADL Assistive Devices  ADL Assistive Devices: Reacher;Hand-held Shower  Other: Pt reports that she has a TTB already but it hasn't been set-up yet. Safety Devices  Safety Devices in place: Yes  Type of devices: Left in chair (Pt left supervised in PT gym at end of session. )  Restraints  Initially in place: No    Patient Education  Education  Education Given To: Patient  Education Provided: Role of Therapy;Plan of Care;ADL Function;Transfer Training;Equipment  Education Method: Demonstration;Verbal  Barriers to Learning: None  Education Outcome: Verbalized understanding;Demonstrated understanding    Plan  Plan  Times per Week: 5-7  Times per Day: Twice a day  Current Treatment Recommendations: Self-Care / ADL; Strengthening;ROM;Balance training;Functional mobility training; Endurance training; Wheelchair mobility training    Goals  Patient Goals   Patient goals : \"To get my body that way so that I can take care of things on my own. \"  Short Term Goals  Time Frame for Short term goals: By 1 week  Short Term Goal 1: Pt will complete lower body dressing/bathing with min A and Good safety with use of AE as needed  Short Term Goal 2: Pt will complete upper body dressing with CGA  and Good safety while maintaining cervical precautions  Short Term Goal 3: Pt will complete functional transfers during self care tasks with min A and Good safety  Short Term Goal 4: Pt will tolerate standing 4+ minutes during functional activity of choice with min A and Good safety  Short Term Goal 5: Pt will verbalize/demonstrate Good understanding of cervical precautions during self care tasks with increase safety and independence with daily activities  Short Term Goal 6: Pt will participate in 30+ mintues of therapeutic exercises/functional activities to increase safety and independence with self care and mobility  Long Term Goals  Time Frame for Long term goals : By discharge  Long Term Goal 1: Pt will complete BADLs with modified independence and Good safety while maintaining cervical precautions  Long Term Goal 2: Pt will complete functional transfers during self care tasks with modified independence with Good safety   Long Term Goal 3: Pt will tolerate standing 8+ mintues during functional activity of choice with Good safety  Long Term Goal 4: Pt will verbalize/demonstrate Good understanding of home safety/fall prevention strategies to increase safety and independence with self care and mobility  Long Term Goal 5: Pt will verbalize/demonstrate Good understanding of adaptive strategies/AE/DME to assisit with maintaining cervical precautions and increase safety and independence with self care and mobility  Long Term Goal 6: Pt will complete simple meal prep/light house keeping tasks with CGA and Good safety          04/28/22 1247   OT Individual Minutes   Time In 0804   Time Out 0903   Minutes ABHILASH Arevalo/NARDA

## 2022-04-28 NOTE — PROGRESS NOTES
Occupational Therapy  Facility/Department: [facility] ACUTE REHAB  Rehabilitation Occupational Therapy Daily Treatment Note    Date: 22  Patient Name: Mary Meng       Room: 8969/6395-69  MRN: [id number]  Account: [de-identified]   : 1960  (62 y.o.) Gender: female        Diagnosis: Nontraumatic cervical spinal cord injury           Past Medical History:  has a past medical history of Anxiety, Arthritis, At maximum risk for fall, Cancer ([address] Cauda equina syndrome ([address] Cervical stenosis of spine, GERD (gastroesophageal reflux disease), History of stomach ulcers, Hypertension, Hypothyroidism, Lumbar neuritis, Post laminectomy syndrome, Spinal deformity, Thyroid disease, Uses wheelchair, Wears dentures, and Wellness examination. Past Surgical History:   has a past surgical history that includes hernia repair (Bilateral); Breast surgery (Right); Mastectomy, radical; Hysterectomy, total abdominal; Lumbar spine surgery (N/A, 2021); back surgery; Colonoscopy (about ); other surgical history (2022); cervical fusion (N/A, 2022); and cervical fusion (N/A, 2022). Restrictions  Restrictions/Precautions: Fall Risk;General Precautions  Implants present? : Metal implants  Other position/activity restrictions: s/p C2-C7 fixation  Required Braces or Orthoses  Cervical: c-collar  Required Braces or Orthoses?: Yes    Subjective         \"I was surprised. \"  (how much strength she gained in L hand)           Objective                           OT Exercises  A/AROM Exercises: written ex HEP provided for BUE hand coordination and for BUE AROM  (shld to fingers) and strengthening- 1 lb weight shld ex on R, none on L, elbow curls 2 lbs, wrist and forearm 5 lb on R, 2 on L.  pt practiced ex post demo, foam resistance block provided for hand strengthening. Static Standing Balance Exercises: pt joseph 8 min standing with RW including 2 brief 8 foot ambulations.      Assessment  Assessment  Activity Tolerance: Patient tolerated treatment well  Discharge Recommendations: Home with assist PRN;Patient would benefit from continued therapy after discharge  OT Equipment Recommendations  Equipment Needed: Yes  Mobility Devices: ADL Assistive Devices  ADL Assistive Devices: Reacher;Hand-held Shower  Other: Pt reports that she has a TTB already but it hasn't been set-up yet. Patient Education  Education  Education Given To: Patient  Education Provided: Home Exercise Program  Education Provided Comments: written ex HEP provided for BUE hand coordination and for BUE AROM and strengthening, pt practiced ex post demo, foam resistance block provided for hand strengthening. Education Method: Demonstration;Verbal;Printed Information/Hand-outs; Teach Back  Barriers to Learning: None  Education Outcome: Verbalized understanding;Demonstrated understanding    Plan  Plan  Times per Week: 5-7  Times per Day: Twice a day  Current Treatment Recommendations: Self-Care / ADL; Strengthening;ROM;Balance training;Functional mobility training; Endurance training; Wheelchair mobility training    Goals  Patient Goals   Patient goals : \"To get my body that way so that I can take care of things on my own. \"  Short Term Goals  Time Frame for Short term goals: By 1 week  Short Term Goal 1: Pt will complete lower body dressing/bathing with min A and Good safety with use of AE as needed  Short Term Goal 2: Pt will complete upper body dressing with CGA  and Good safety while maintaining cervical precautions  Short Term Goal 3: Pt will complete functional transfers during self care tasks with min A and Good safety  Short Term Goal 4: Pt will tolerate standing 4+ minutes during functional activity of choice with min A and Good safety  Short Term Goal 5: Pt will verbalize/demonstrate Good understanding of cervical precautions during self care tasks with increase safety and independence with daily activities  Short Term Goal 6: Pt will participate in 30+ mintues of therapeutic exercises/functional activities to increase safety and independence with self care and mobility  Long Term Goals  Time Frame for Long term goals : By discharge/  4-28-22 all STG and LTG are met  Long Term Goal 1: Pt will complete BADLs with modified independence and Good safety while maintaining cervical precautions  Long Term Goal 2: Pt will complete functional transfers during self care tasks with modified independence with Good safety   Long Term Goal 3: Pt will tolerate standing 8+ mintues during functional activity of choice with Good safety  Long Term Goal 4: Pt will verbalize/demonstrate Good understanding of home safety/fall prevention strategies to increase safety and independence with self care and mobility  Long Term Goal 5: Pt will verbalize/demonstrate Good understanding of adaptive strategies/AE/DME to assisit with maintaining cervical precautions and increase safety and independence with self care and mobility  Long Term Goal 6: Pt will complete simple meal prep/light house keeping tasks with CGA and Good safety  Long Term Goal 7: OT to assess FM and  strength    Therapy Time   Individual Concurrent Group Co-treatment   Time In 1507         Time Out 1550         Minutes [place], [place]

## 2022-04-28 NOTE — CARE COORDINATION
Called  MAYRA spoke with Kassi Killian and confirmed patients last Rx for Methadone was given to her on 3/26/22. Informed her patient we will be discharged tomorrow and patient would need a refill on her methadone. Per Kassi Killian, no Rx will be dispensed without an appointment. Asked if they would be willing to give patient enough medication until her appointment. She informed me they typically do not. I asked to speak to their CNP. She transferred the call to their Rachelle, their CNP. Updated Rachelle regarding her this patient and the need to avoid interruption in her Methadone. She agreed to provide a Rx for patient Mehtadone. She will find the first [facility] appointment and will call back at our mobile phone [phone] and will speak with the patient. She then will give the patient enough medications to get her to her next appointment. MAYRA called and spoke with patient. Appointment for May 17th at 2pm given. They assured her she will get enough medication to take her to her appointment date. Appointment added to AVS.    Received a return called from Rachelle DELAROSA who questions when patient was admitted in hospital and if patient took her own meds and if patient  was not taking her own Methadone. If she was not, she would have enough medications at home till her next appointment. Meanwhile, spoke with patient who is unsure if she took her own methadone while in [address] [place] [address] and is not sure how many she has at home. Placed a call to [facility]'s inpatient pharmacy to verify patient did indeed get her Methadone through their pharmacy. Spoke with Ned/ Pharmacist,  who states patient received all her medications from their pharmacy, she would not been permitted to take her own medications. Reached out to Rachelle DELAROSA and updated on the above information; she advised the followin.  Based on when the patient's Methadone was last dispensed, she should have enough medications at home, at least enough to take her through the weekend. 2. [facility] will call the patient on Monday and they will decide what to do; possibly do a pill count to see exactly how much methadone she has left. 3. Keep the May 17th appointment; Shayna Hardin will make sure she has enough medications to take her to that appointment date. Will route this updated  note to update .

## 2022-04-28 NOTE — PLAN OF CARE
Problem: Skin Integrity:  Goal: Will show no infection signs and symptoms  Description: Will show no infection signs and symptoms  Outcome: Progressing  Goal: Absence of new skin breakdown  Description: Absence of new skin breakdown  Outcome: Progressing     Problem: Falls - Risk of:  Goal: Will remain free from falls  Description: Will remain free from falls  Outcome: Progressing  Goal: Absence of physical injury  Description: Absence of physical injury  Outcome: Progressing     Problem: Discharge Planning:  Goal: Discharged to appropriate level of care  Description: Discharged to appropriate level of care  Outcome: Progressing     Problem: Pain:  Description: Pain management should include both nonpharmacologic and pharmacologic interventions.   Goal: Pain level will decrease  Description: Pain level will decrease  Outcome: Progressing  Goal: Control of acute pain  Description: Control of acute pain  Outcome: Progressing  Goal: Control of chronic pain  Description: Control of chronic pain  Outcome: Progressing     Problem: Nutrition  Goal: Optimal nutrition therapy  Outcome: Progressing     Problem: Musculor/Skeletal Functional Status  Goal: Highest potential functional level  Outcome: Progressing  Goal: Absence of falls  Outcome: Progressing     Problem: Discharge Planning  Goal: Discharge to home or other facility with appropriate resources  Outcome: Progressing     Problem: ABCDS Injury Assessment  Goal: Absence of physical injury  Outcome: Progressing

## 2022-04-28 NOTE — PLAN OF CARE
Problem: Skin Integrity:  Goal: Will show no infection signs and symptoms  Description: Will show no infection signs and symptoms  4/28/2022 0657 by Manuel Jim RN  Outcome: Progressing  4/28/2022 0235 by Ann Marie Santos RN  Outcome: Progressing  Goal: Absence of new skin breakdown  Description: Absence of new skin breakdown  4/28/2022 0657 by Manuel Jim RN  Outcome: Progressing  4/28/2022 0235 by Ann Marie Santos RN  Outcome: Progressing     Problem: Falls - Risk of:  Goal: Will remain free from falls  Description: Will remain free from falls  4/28/2022 0657 by Manuel Jim RN  Outcome: Progressing  4/28/2022 0235 by Ann Marie Santos RN  Outcome: Progressing  Goal: Absence of physical injury  Description: Absence of physical injury  4/28/2022 0657 by Manuel Jim RN  Outcome: Progressing  4/28/2022 0235 by Ann Marie Santos RN  Outcome: Progressing     Problem: Discharge Planning:  Goal: Discharged to appropriate level of care  Description: Discharged to appropriate level of care  4/28/2022 0657 by Manuel Jim RN  Outcome: Progressing  4/28/2022 0235 by Ann Marie Santos RN  Outcome: Progressing

## 2022-04-28 NOTE — PLAN OF CARE
Problem: Skin Integrity:  Goal: Will show no infection signs and symptoms  Description: Will show no infection signs and symptoms  4/28/2022 1438 by Gina Oglesby RN  Outcome: Progressing  4/28/2022 0657 by Gina Oglesby RN  Outcome: Progressing  4/28/2022 0235 by Dion Biggs RN  Outcome: Progressing  Goal: Absence of new skin breakdown  Description: Absence of new skin breakdown  4/28/2022 1438 by Gina Oglesby RN  Outcome: Progressing  4/28/2022 0657 by Gina Oglesby, RN  Outcome: Progressing  4/28/2022 0235 by Dion Biggs RN  Outcome: Progressing     Problem: Falls - Risk of:  Goal: Will remain free from falls  Description: Will remain free from falls  4/28/2022 1438 by Gina Oglesby RN  Outcome: Progressing  4/28/2022 0657 by Gina Oglesby RN  Outcome: Progressing  4/28/2022 0235 by Dion Biggs RN  Outcome: Progressing  Goal: Absence of physical injury  Description: Absence of physical injury  4/28/2022 1438 by Gina Oglesby, RN  Outcome: Progressing  4/28/2022 0657 by Gina Oglesby, RN  Outcome: Progressing  4/28/2022 0235 by Dion Biggs RN  Outcome: Progressing     Problem: Discharge Planning:  Goal: Discharged to appropriate level of care  Description: Discharged to appropriate level of care  4/28/2022 1438 by Gina Oglesby, RN  Outcome: Progressing  4/28/2022 0657 by Gina Oglesby RN  Outcome: Progressing  4/28/2022 0235 by Dion Biggs RN  Outcome: Progressing     Problem: Pain:  Goal: Pain level will decrease  Description: Pain level will decrease  4/28/2022 1438 by Gina Oglesby, RN  Outcome: Progressing  4/28/2022 0657 by Gina Oglesby, RN  Outcome: Progressing  4/28/2022 0235 by Dion Biggs RN  Outcome: Progressing  Goal: Control of acute pain  Description: Control of acute pain  4/28/2022 1438 by Gina Oglesby, RN  Outcome: Progressing  4/28/2022 0657 by Gina Oglesby RN  Outcome: Progressing  4/28/2022 0235 by López Bueno DENA Chen  Outcome: Progressing  Goal: Control of chronic pain  Description: Control of chronic pain  4/28/2022 1438 by Jessika Niño RN  Outcome: Progressing  4/28/2022 0657 by Jessika Niño RN  Outcome: Progressing  4/28/2022 0235 by Ping Mcneill RN  Outcome: Progressing

## 2022-04-28 NOTE — PROGRESS NOTES
Physical Medicine & Rehabilitation  Progress Note      Subjective:      62year-old female with nontraumatic cervical spinal cord injury secondary to servical stenosis. Patient is doing well today. She reports pain is controlled. She continues with some itching at site of irritation at incision. She reports spontaneous bowel movements last night and this morning so she did not need Dulcolax. ROS:  Denies fevers, chills, sweats. No chest pain, palpitations, lightheadedness. Denies coughing, wheezing or shortness of breath. Denies abdominal pain, nausea, diarrhea   No new areas of joint pain. Denies new areas of numbness or weakness. Denies new anxiety or depression issues. No new skin problems. Rehabilitation:   Progressing in therapies. PT:  Restrictions/Precautions: Fall Risk,General Precautions  Implants present? : Metal implants  Other position/activity restrictions: s/p C2-C7 fixation  Required Braces or Orthoses  Cervical: c-collar   Transfers  Sit to Stand: Stand by assistance  Stand to sit: Stand by assistance  Bed to Chair: Stand by assistance  Comment: Completed with RW. Ambulation  Surface: level tile  Device: Rolling Walker  Other Apparatus: Wheelchair follow  Assistance: Stand by assistance  Quality of Gait: Pt demos decrease LLE cordination, internal rotation with toe off, and decrease step height. Occassional NBOS noted. Gait Deviations: Slow Sharon,Decreased step length,Decreased arm swing  Distance: 2MWT: 80' and finished amb totaling 208' in 5 min and 12 sec. Comments: seated rest break after 80ft  More Ambulation?: No    Transfers  Sit to Stand: Stand by assistance  Stand to sit: Stand by assistance  Bed to Chair: Stand by assistance  Comment: Completed with RW.       Ambulation  Surface: level tile  Device: Rolling Walker  Other Apparatus: Wheelchair follow  Assistance: Stand by assistance  Quality of Gait: Pt demos decrease LLE cordination, internal rotation with toe off, and decrease step height. Occassional NBOS noted. Gait Deviations: Slow Sharon,Decreased step length,Decreased arm swing  Distance: 2MWT: 80' and finished amb totaling 208' in 5 min and 12 sec. Comments: seated rest break after 80ft  More Ambulation?: No    Surface: level tile  Ambulation  Surface: level tile  Device: Rolling Walker  Other Apparatus: Wheelchair follow  Assistance: Stand by assistance  Quality of Gait: Pt demos decrease LLE cordination, internal rotation with toe off, and decrease step height. Occassional NBOS noted. Gait Deviations: Slow Sharon,Decreased step length,Decreased arm swing  Distance: 2MWT: 80' and finished amb totaling 208' in 5 min and 12 sec. Comments: seated rest break after 80ft  More Ambulation?: No    OT:  ADL  Equipment Provided: Sock aid,Long-handled sponge,Reacher  Feeding: Setup  Grooming: Minimal assistance  UE Bathing: Stand by assistance  LE Bathing: Minimal assistance  UE Dressing: Verbal cueing,Minimal assistance (standing during juanita care )  LE Dressing: Minimal assistance  Toileting: Minimal assistance  Additional Comments: Pt completed full shower this date. Seated on tub bench, utilizing hand held shower head and long handled sponge. Pt nust leav on c-collar during shower, changing to spare after shower is complete.      Instrumental ADL's  Instrumental ADLs: Yes     Balance  Sitting Balance: Stand by assistance  Standing Balance: Minimal assistance   Standing Balance  Time: 1-2 min x3   Activity: transfers ,ADL activites   Comment: 1-2 UE support  Functional Mobility  Functional - Mobility Device: Wheelchair  Activity: To/from bathroom  Assist Level: Dependent/Total  Functional Mobility Comments: did not self propel      Bed mobility  Bridging:  (not indicated at this time)  Rolling to Left: Moderate assistance  Rolling to Right: Minimal assistance  Supine to Sit: Stand by assistance  Sit to Supine: Stand by assistance  Scooting: Stand by assistance  Comment: \"I mainly sleep in my recliner\". Transfers  Stand Step Transfers: Minimal assistance  Stand Pivot Transfers: Minimal assistance  Sit to stand: Minimal assistance  Stand to sit: Minimal assistance  Transfer Comments: Pt min A for transfers this date with verbal cues required for hand/foot placement during transfers for safety. Toilet Transfers  Toilet - Technique: Stand pivot,To left,To right  Equipment Used: Grab bars  Toilet Transfer: Minimal assistance  Toilet Transfers Comments: Verbal cues for hand placement and safety. L side weakness with LLE buckling. Shower Transfers  Shower - Transfer From: Wheelchair  Shower - Transfer Type: To and From  Shower - Transfer To: Transfer tub bench  Shower - Technique: Stand pivot,To right,To left  Shower Transfers: Contact Guard  Shower Transfers Comments: cues for safety   Wheelchair Bed Transfers  Wheelchair/Bed - Technique: Stand pivot  Equipment Used: Bed,Wheelchair  Level of Asssistance: Minimal assistance  Wheelchair Transfers Comments: Verbal cues for hand placement and safety. L side weakness with LLE buckling. SPEECH:      Objective:  /75   Pulse 89   Temp 98 °F (36.7 °C)   Resp 19   Ht 5' 1\" (1.549 m)   Wt 140 lb (63.5 kg)   SpO2 98%   BMI 26.45 kg/m²       GEN: Well developed, well nourished, in NAD  HEENT:  NCAT. PERRL. EOMI. Mucous membranes pink and moist. Hard cervical collar in place  PULM:  Clear to ausculation. No rales or rhonchi. Respirations WNL and unlabored. CV:  Regular rate rhythm. No murmurs or gallops. GI:  Abdomen soft. Tender, distended. BS + and equal.    NEUROLOGICAL: A&O x3. Sensation intact to light touch. .   MSK:  Functional ROM BUE and BLEs. Motor testing 4/5 key muscles BUEs, 4-/5 B hip flexion, 4-/5 R knee extension, 4-/5 L knee extension, 4-/5 R dorsiflexion, 3/5 L dorsiflexion. SKIN: Warm dry and intact. Good turgor.  Posterior cervical spine incision well approximated with some erythema laterally to incision itself. No induration or drainage. EXTREMITIES:  No calf tenderness to palpation. No edema BLEs. PSYCH: Mood WNL. Appropriately interactive. Affect WNL. Diagnostics:     CBC:   No results for input(s): WBC, RBC, HGB, HCT, MCV, RDW, PLT in the last 72 hours. BMP:   No results for input(s): NA, K, CL, CO2, PHOS, BUN, CREATININE, CA, GLUCOSE in the last 72 hours. BNP: No results for input(s): BNP in the last 72 hours. PT/INR: No results for input(s): PROTIME, INR in the last 72 hours. APTT: No results for input(s): APTT in the last 72 hours. CARDIAC ENZYMES: No results for input(s): CKMB, CKMBINDEX, TROPONINT in the last 72 hours. Invalid input(s): CKTOTAL;3 troponins   FASTING LIPID PANEL:  Lab Results   Component Value Date    CHOL 198 07/23/2020     (A) 07/23/2020    TRIG 96 07/23/2020     LIVER PROFILE: No results for input(s): AST, ALT, ALB, BILIDIR, BILITOT, ALKPHOS in the last 72 hours.      Current Medications:   Current Facility-Administered Medications: bisacodyl (DULCOLAX) suppository 10 mg, 10 mg, Rectal, Q48H  levothyroxine (SYNTHROID) tablet 100 mcg, 100 mcg, Oral, Daily  vitamin D3 (CHOLECALCIFEROL) tablet 5,000 Units, 5,000 Units, Oral, Weekly  baclofen (LIORESAL) tablet 10 mg, 10 mg, Oral, 4x Daily  lisinopril (PRINIVIL;ZESTRIL) tablet 5 mg, 5 mg, Oral, Daily  polyethylene glycol (GLYCOLAX) packet 17 g, 17 g, Oral, Daily  senna (SENOKOT) tablet 17.2 mg, 2 tablet, Oral, Daily PRN  acetaminophen (TYLENOL) tablet 650 mg, 650 mg, Oral, Q6H  magnesium hydroxide (MILK OF MAGNESIA) 400 MG/5ML suspension 30 mL, 30 mL, Oral, Daily PRN  ondansetron (ZOFRAN-ODT) disintegrating tablet 4 mg, 4 mg, Oral, Q8H PRN **OR** [DISCONTINUED] ondansetron (ZOFRAN) injection 4 mg, 4 mg, IntraVENous, Q6H PRN  oxyCODONE (ROXICODONE) immediate release tablet 5 mg, 5 mg, Oral, Q4H PRN **OR** oxyCODONE HCl (OXY-IR) immediate release tablet 10 mg, 10 mg, Oral, Q4H PRN  busPIRone (BUSPAR) tablet 30 mg, 30 mg, Oral, BID  dilTIAZem (CARDIZEM CD) extended release capsule 180 mg, 180 mg, Oral, Daily  docusate sodium (COLACE) capsule 200 mg, 200 mg, Oral, BID  enoxaparin (LOVENOX) injection 40 mg, 40 mg, SubCUTAneous, Daily  ferrous sulfate (IRON 325) tablet 325 mg, 325 mg, Oral, BID WC  methadone (DOLOPHINE) tablet 10 mg, 10 mg, Oral, BID  neomycin-bacitracin-polymyxin (NEOSPORIN) ointment, , Topical, BID  pantoprazole (PROTONIX) tablet 40 mg, 40 mg, Oral, Daily      Impression/Plan:   Impaired ADLs, gait, and mobility due to:      1. Nontraumatic cervical spinal cord injury secondary to cervical stenosis: s/p C5 corpectomy, C4-6 arthrodesis, open reduction cervical kyphosis, C2-7 posterior fusion performed by Dr. Mahogany Hancock on 4/12/2022. PT/OT for gait, mobility, strengthening, endurance, ADLs, and self care. Has Tylenol prn, methadone BID, robaxin prn, oxycodone prn for pain. She treats with [facility] for methadone and will require outpatient follow up with them at discharge and for methadone outpatient prescription. Sent picture of incision to Dr. Mahogany Hancock via secure message today - he gave approval for sutures to be removed with dry dressing to any open areas. 2. Muscle spasms: resolved after discontinuing Robaxin and changed to routine baclofen. Dose increased 4/18. 3. Hx cauda equina syndrome: s/p L2-S1 laminectomy (December 2021)  4. L foot drop: therapy treating. Orthotist measured for L AFO. 5. HTN: on Diltiazem. 6. Anemia: Hb low but improving. On ferrous sulfate. Monitoring. 7. Hypothyroidism: on levothyroxine. TSH 16, free T4 low normal - IM increased levothyroxine dose to 100 mcg daily 4/24.   8. Hyponatremia: improving. Monitoring. Medical management by IM  9. GERD: on pantoprazole. 10. Anxiety:on buspar. 11. Allergic rhinitis: Flonase daily  12. Vitamin D deficiency: on repletion weekly  13. Hx breast cancer  14. Bowel Management/Neurogenic bowel: Miralax daily, senokot prn. Will change Dulcolax to q other day after therapies completed. 15. DVT Prophylaxis:  low molecular weight heparin, SCD's while in bed and HERMINIO's   16. Internal medicine for medical management  17. Achieving therapy goals. Anticipate discharge home tomorrow with outpatient therapy. Follow up Dr. Mary Alice Ceballos 1-2 weeks, Dr. Irving Jones 4-6 weeks, PCP 1-2 weeks. Electronically signed by Isabel Field MD on 4/28/2022 at 9:37 AM      This note is created with the assistance of a speech recognition program.  While intending to generate a document that actually reflects the content of the visit, the document can still have some errors including those of syntax and sound a like substitutions which may escape proof reading. In such instances, actual meaning can be extrapolated by contextual diversion.

## 2022-04-28 NOTE — PROGRESS NOTES
[facility] Internal Medicine  Sylvester Jaimes MD; Bobbi cK MD; Gómez Walters MD; MD Wilber Estevez MD; Aide Arcos MD    KEENAN GERMANAriella [facility] Internal Medicine   [address] / HISTORY AND PHYSICAL EXAMINATION            Date:   4/28/2022  Patient name:  Zeb Blackwood  Date of admission:  4/15/2022  5:32 PM  MRN:   [id number]  Account:  [de-identified]  YOB: 1960  PCP:    Amanda Lopez MD  Room:   [address] [place] [address]  Code Status:    Full Code    Physician Requesting Consult: Jordan Troncoso MD    Reason for Consult: Medical management    Chief Complaint:     No chief complaint on file. Underwent anterior C5 corpectomy, posterior fixation from C2-T1 with arthrodesis, osteotomy, correction of deformity on April 12    History Obtained From:     patient    History of Present Illness: The patient is a 61 y.o. female presented for elective surgery. Underwent anterior C5 corpectomy, posterior fixation from C2-T1 with arthrodesis, osteotomy, correction of deformity on 4/12 with Dr. [place] 4/12. Symptoms include significant left-sided paresis, severe lower extremity weakness (wheelchair bound, now unable to stand or ambulate [id number]), progressively worsening paresthesias and dexterity issues with left upper extremity and left lower extremity.  Additionally complains of some moderate saddle anesthesia, urge incontinence but no bowel dysfunction.   Admitted to acute rehab for further management for deconditioning,  4/22  No new complaints  Pain is controlled on methadone  Working with therapy      Past Medical History:     Past Medical History:   Diagnosis Date    Anxiety     Arthritis     At maximum risk for fall 12/29/2021    caudal equina syndrome and cervical stenosis    Cancer ([address]     right breast    Cauda equina syndrome ([address] 12/29/2021    neuropathy, mobility difficulty, incontinance    Cervical stenosis of spine 12/29/2021    GERD (gastroesophageal reflux disease)     History of stomach ulcers     Hypertension     Dr. Derek López    Hypothyroidism     Lumbar neuritis     Post laminectomy syndrome     Spinal deformity 11/2021    cervical and lumbar    Thyroid disease     Uses wheelchair     Wears dentures     Wellness examination     Dr. Derek López        Past Surgical History:     Past Surgical History:   Procedure Laterality Date    BACK SURGERY      lower back x 3, cervical- x 1: C4- C6, 11/4/2014     BREAST SURGERY Right     mastectomy with reconstruction    CERVICAL FUSION N/A 4/12/2022    C5 CORPECTOMY, USE OF INTRAOPERATIVE CERVICAL TRACTION performed by Delbert Mckeon DO at [facility] N/A 4/12/2022    POSTERIOR FIXATION C2-C7 performed by Delbert Mckeon DO at [facility]  about 2018    HERNIA REPAIR Bilateral     inguinal    HYSTERECTOMY, TOTAL ABDOMINAL      LUMBAR SPINE SURGERY N/A 12/30/2021    LUMBAR LAMINECTOMY L2-S1 performed by Delbert Mckeon DO at [address] [facility]  04/12/2022    C5 CORPECTOMY, USE OF INTRAOPERATIVE CERVICAL TRACTION (N/A Spine Cervical)         Medications Prior to Admission:     Prior to Admission medications    Medication Sig Start Date End Date Taking? Authorizing Provider   HYDROcodone-acetaminophen (NORCO) 7.5-325 MG per tablet Take 1 tablet by mouth every 8 hours as needed for Pain for up to 7 days. Up to three tablets a day.  4/28/22 5/5/22 Yes Yan Brody MD   lisinopril (PRINIVIL;ZESTRIL) 5 MG tablet Take 1 tablet by mouth daily 4/29/22  Yes Yan Brody MD   dilTIAZem (CARDIZEM CD) 180 MG extended release capsule Take 1 capsule by mouth daily 4/29/22  Yes Yan Brody MD   fluticasone (FLONASE) 50 MCG/ACT nasal spray 1 spray by Each Nostril route daily 4/29/22  Yes Yan Brody MD   neomycin-bacitracin-polymyxin (NEOSPORIN) 400-5-5000 ointment Apply topically 2 times daily. 4/28/22 5/8/22 Yes Tabitha Treviño MD   bisacodyl (DULCOLAX) 10 MG suppository Place 1 suppository rectally every 48 hours 4/29/22 5/29/22 Yes Tabitha Treviño MD   docusate sodium (COLACE) 100 MG capsule Take 2 capsules by mouth 2 times daily 4/28/22  Yes Tabitha Treviño MD   senna (SENOKOT) 8.6 MG tablet Take 2 tablets by mouth daily as needed (Constipation) 4/28/22 5/28/22 Yes Tabitha Treviño MD   baclofen (LIORESAL) 10 MG tablet Take 1 tablet by mouth 4 times daily 4/28/22  Yes Tabitha Treviño MD   levothyroxine (SYNTHROID) 100 MCG tablet Take 1 tablet by mouth Daily 4/29/22  Yes Tabitha Treviño MD   pantoprazole (PROTONIX) 40 MG tablet Take 1 tablet by mouth daily 4/29/22  Yes Tabitha Treviño MD   ferrous sulfate (IRON 325) 325 (65 Fe) MG tablet Take 1 tablet by mouth 2 times daily (with meals) 1/13/22   Tabitha Treviño MD   busPIRone (BUSPAR) 30 MG tablet Take 30 mg by mouth 2 times daily 12/17/21   Daquan Mariscal MD   Vitamin D, Ergocalciferol, 50 MCG (1615 UT) CAPS TAKE ONE CAPSULE BY MOUTH DAILY 3/29/21   Kate Guaman PA-C   methadone (DOLOPHINE) 10 MG tablet Take 10 mg by mouth 2 times daily. Historical Provider, MD        Allergies:     Latex and Kiwi extract    Social History:     Tobacco:    reports that she quit smoking about 8 years ago. She has a 15.00 pack-year smoking history. She has never used smokeless tobacco.  Alcohol:      reports current alcohol use. Drug Use:  reports no history of drug use. Family History:     Family History   Problem Relation Age of Onset    Hypertension Mother     Heart Disease Mother     Cancer Mother        Review of Systems:     Positive and Negative as described in HPI. CONSTITUTIONAL:  negative for fevers, chills, sweats, fatigue, weight loss  HEENT:  negative for vision, hearing changes, runny nose, throat pain  RESPIRATORY:  negative for shortness of breath, cough, congestion, wheezing.   CARDIOVASCULAR:  negative for chest pain, palpitations. GASTROINTESTINAL:  negative for nausea, vomiting, diarrhea, constipation, change in bowel habits, abdominal pain   GENITOURINARY:  negative for difficulty of urination, burning with urination, frequency   INTEGUMENT:  negative for rash, skin lesions, easy bruising   HEMATOLOGIC/LYMPHATIC:  negative for swelling/edema   ALLERGIC/IMMUNOLOGIC:  negative for urticaria , itching  ENDOCRINE:  negative increase in drinking, increase in urination, hot or cold intolerance  MUSCULOSKELETAL:  negative joint pains, muscle aches, swelling of joints  NEUROLOGICAL:  negative for headaches, dizziness, lightheadedness, numbness, pain, tingling extremities  BEHAVIOR/PSYCH:  negative for depression, anxiety    Physical Exam:     /75   Pulse 89   Temp 98 °F (36.7 °C)   Resp 19   Ht 5' 1\" (1.549 m)   Wt 140 lb (63.5 kg)   SpO2 98%   BMI 26.45 kg/m²   Temp (24hrs), Av °F (36.7 °C), Min:98 °F (36.7 °C), Max:98 °F (36.7 °C)    No results for input(s): POCGLU in the last 72 hours. Intake/Output Summary (Last 24 hours) at 2022 1418  Last data filed at 2022  Gross per 24 hour   Intake 480 ml   Output --   Net 480 ml       General Appearance:  alert, well appearing, and in no acute distress  Mental status: oriented to person, place, and time with normal affect  Head:  normocephalic, atraumatic. Eye: no icterus, redness, pupils equal and reactive, extraocular eye movements intact, conjunctiva clear  Ear: normal external ear, no discharge, hearing intact  Nose:  no drainage noted  Mouth: mucous membranes moist  Neck: supple, no carotid bruits, thyroid not palpable  Lungs: Bilateral equal air entry, clear to ausculation, no wheezing, rales or rhonchi, normal effort  Cardiovascular: normal rate, regular rhythm, no murmur, gallop, rub.   Abdomen: Soft, nontender, nondistended, normal bowel sounds, no hepatomegaly or splenomegaly  Neurologic: There are no new focal motor or sensory deficits, normal muscle tone and bulk, no abnormal sensation, normal speech, cranial nerves II through XII grossly intact  Skin: No gross lesions, rashes, bruising or bleeding on exposed skin area  Extremities:  peripheral pulses palpable, no pedal edema or calf pain with palpation  Psych: normal affect    Investigations:      Laboratory Testing:  No results found for this or any previous visit (from the past 24 hour(s)). Imaging/Diagonstics:  XR CERVICAL SPINE (2-3 VIEWS)    Result Date: 4/13/2022  Expected postoperative findings status post anterior and posterior cervical fusion. XR CHEST PORTABLE    Result Date: 4/13/2022  No acute cardiopulmonary disease. XR CERVICAL SPINE FLEXION AND EXTENSION    Result Date: 4/13/2022  1. Retrolisthesis of C2 on C3 measures 2 mm on extension view and reduces on flexion view. 2. No evidence of instability of anterolisthesis of C3 on C4 measuring 1.5 mm. 3. No acute fracture on limited views. Of note, the C6-C7 and C7-T1 levels are not well seen due to overlying soft tissues. 4. Severe multilevel degenerative changes. CTA NECK W CONTRAST    Result Date: 4/12/2022  Unremarkable CTA of the neck. Minor hazy pleural thickening and chronic-appearing medial right upper lobe atelectasis/scarring. If there is any clinical concern, follow-up noncontrast chest CT may be useful. RECOMMENDATIONS: Unavailable       Assessment :      Hospital Problems           Last Modified POA    * (Principal) Spinal cord injury, cervical region, sequela ([address] 4/15/2022 Yes    Essential hypertension 4/16/2022 Yes    Hypothyroidism 4/16/2022 Yes    Post-menopausal osteoporosis 4/16/2022 Yes    Cervical myelopathy ([address] 4/16/2022 Yes    Anemia, normocytic normochromic 4/16/2022 Yes          Plan:     1. Spinal cord injury status post anterior C5 corpectomy, posterior fixation from C2-T1 with arthrodesis, osteotomy,  2.  Essential hypertension,  Controlled, continue monitor blood pressure, continue home medications, added lisinopril, diltiazem,Controlled   3. Hyponatremia, better now ,  4. Constipation , prn meds   5. Hypothyroidism, continue levothyroxine,  6. Continue physical therapy,  7. DVT prophylaxis    Consultations:   IP CONSULT TO DIETITIAN  IP CONSULT TO SOCIAL WORK  IP CONSULT TO INTERNAL MEDICINE  INPATIENT CONSULT TO ORTHOTIST/PROSTHETIST      4/24/22    · TSH 16 ,free t4 low normal .  · Increase dose levothyroxine to 100 mcg daily  1. Progressing satisfactory with rehab therapies . 4/25  Tolerating higher dose Synthroid, TSH to be repeated in 3 months outpatient        4/26   Patient reports no new complaints. Engaging with physical therapy. Labs and vitals reviewed. No new issues. Continue with current care. 4/28  Patient reports no new complaints. Engaging with physical therapy. Labs and vitals reviewed. No new issues. Continue with current care. Alvarez Edwards MD  4/28/2022  2:18 PM    Copy sent to Dr. Gypsy Baron MD    Please note that this chart was generated using voice recognition Dragon dictation software. Although every effort was made to ensure the accuracy of this automated transcription, some errors in transcription may have occurred.

## 2022-04-29 VITALS
WEIGHT: 140 LBS | HEIGHT: 61 IN | HEART RATE: 84 BPM | DIASTOLIC BLOOD PRESSURE: 87 MMHG | TEMPERATURE: 97.7 F | OXYGEN SATURATION: 96 % | RESPIRATION RATE: 16 BRPM | BODY MASS INDEX: 26.43 KG/M2 | SYSTOLIC BLOOD PRESSURE: 149 MMHG

## 2022-04-29 PROCEDURE — 6370000000 HC RX 637 (ALT 250 FOR IP): Performed by: REGISTERED NURSE

## 2022-04-29 PROCEDURE — 6370000000 HC RX 637 (ALT 250 FOR IP): Performed by: PHYSICAL MEDICINE & REHABILITATION

## 2022-04-29 PROCEDURE — 99238 HOSP IP/OBS DSCHRG MGMT 30/<: CPT | Performed by: PHYSICAL MEDICINE & REHABILITATION

## 2022-04-29 PROCEDURE — 97535 SELF CARE MNGMENT TRAINING: CPT

## 2022-04-29 PROCEDURE — 99231 SBSQ HOSP IP/OBS SF/LOW 25: CPT | Performed by: INTERNAL MEDICINE

## 2022-04-29 PROCEDURE — 97116 GAIT TRAINING THERAPY: CPT

## 2022-04-29 PROCEDURE — 6360000002 HC RX W HCPCS: Performed by: REGISTERED NURSE

## 2022-04-29 PROCEDURE — 97530 THERAPEUTIC ACTIVITIES: CPT

## 2022-04-29 PROCEDURE — 6370000000 HC RX 637 (ALT 250 FOR IP): Performed by: INTERNAL MEDICINE

## 2022-04-29 RX ADMIN — LISINOPRIL 5 MG: 5 TABLET ORAL at 07:43

## 2022-04-29 RX ADMIN — ACETAMINOPHEN 650 MG: 325 TABLET ORAL at 12:58

## 2022-04-29 RX ADMIN — ACETAMINOPHEN 650 MG: 325 TABLET ORAL at 06:08

## 2022-04-29 RX ADMIN — FLUTICASONE PROPIONATE 1 SPRAY: 50 SPRAY, METERED NASAL at 07:48

## 2022-04-29 RX ADMIN — OXYCODONE HYDROCHLORIDE 10 MG: 10 TABLET ORAL at 07:43

## 2022-04-29 RX ADMIN — BACLOFEN 10 MG: 10 TABLET ORAL at 07:43

## 2022-04-29 RX ADMIN — ENOXAPARIN SODIUM 40 MG: 100 INJECTION SUBCUTANEOUS at 07:43

## 2022-04-29 RX ADMIN — DILTIAZEM HYDROCHLORIDE 180 MG: 180 CAPSULE, COATED, EXTENDED RELEASE ORAL at 07:45

## 2022-04-29 RX ADMIN — METHADONE HYDROCHLORIDE 10 MG: 10 TABLET ORAL at 07:43

## 2022-04-29 RX ADMIN — LEVOTHYROXINE SODIUM 100 MCG: 0.1 TABLET ORAL at 06:08

## 2022-04-29 RX ADMIN — BUSPIRONE HYDROCHLORIDE 30 MG: 15 TABLET ORAL at 07:44

## 2022-04-29 RX ADMIN — DOCUSATE SODIUM 200 MG: 100 CAPSULE, LIQUID FILLED ORAL at 07:42

## 2022-04-29 RX ADMIN — FERROUS SULFATE TAB 325 MG (65 MG ELEMENTAL FE) 325 MG: 325 (65 FE) TAB at 07:43

## 2022-04-29 RX ADMIN — BACLOFEN 10 MG: 10 TABLET ORAL at 12:58

## 2022-04-29 RX ADMIN — PANTOPRAZOLE SODIUM 40 MG: 40 TABLET, DELAYED RELEASE ORAL at 06:08

## 2022-04-29 ASSESSMENT — PAIN - FUNCTIONAL ASSESSMENT: PAIN_FUNCTIONAL_ASSESSMENT: ACTIVITIES ARE NOT PREVENTED

## 2022-04-29 ASSESSMENT — PAIN DESCRIPTION - FREQUENCY: FREQUENCY: CONTINUOUS

## 2022-04-29 ASSESSMENT — PAIN DESCRIPTION - LOCATION
LOCATION: NECK

## 2022-04-29 ASSESSMENT — PAIN SCALES - GENERAL
PAINLEVEL_OUTOF10: 7
PAINLEVEL_OUTOF10: 5
PAINLEVEL_OUTOF10: 7
PAINLEVEL_OUTOF10: 8
PAINLEVEL_OUTOF10: 6

## 2022-04-29 ASSESSMENT — PAIN DESCRIPTION - DESCRIPTORS
DESCRIPTORS: ACHING

## 2022-04-29 ASSESSMENT — PAIN DESCRIPTION - ONSET: ONSET: ON-GOING

## 2022-04-29 NOTE — PROGRESS NOTES
Occupational Therapy  Facility/Department: [facility] ACUTE REHAB  Rehabilitation Occupational Therapy Daily Treatment Note    Date: 22  Patient Name: Santi Polk       Room: 8439/2726-69  MRN: [id number]  Account: [de-identified]   : 1960  (62 y.o.) Gender: female                    Past Medical History:  has a past medical history of Anxiety, Arthritis, At maximum risk for fall, Cancer ([facility] Cauda equina syndrome ([facility] Cervical stenosis of spine, GERD (gastroesophageal reflux disease), History of stomach ulcers, Hypertension, Hypothyroidism, Lumbar neuritis, Post laminectomy syndrome, Spinal deformity, Thyroid disease, Uses wheelchair, Wears dentures, and Wellness examination. Past Surgical History:   has a past surgical history that includes hernia repair (Bilateral); Breast surgery (Right); Mastectomy, radical; Hysterectomy, total abdominal; Lumbar spine surgery (N/A, 2021); back surgery; Colonoscopy (about ); other surgical history (2022); cervical fusion (N/A, 2022); and cervical fusion (N/A, 2022). Restrictions  Restrictions/Precautions: Fall Risk;General Precautions  Implants present? : Metal implants  Other position/activity restrictions: s/p C2-C7 fixation  Required Braces or Orthoses?: Yes    Subjective  Subjective: \"Today is the day. \" \"I would like a full shower. \"  Pain Level: 7  Pain Location: Neck  Restrictions/Precautions: Fall Risk;General Precautions        Pain Assessment  Pain Assessment: 0-10  Pain Level: 7  Patient's Stated Pain Goal: 0 - No pain  Pain Location: Neck  Pain Descriptors: Aching  Functional Pain Assessment: Activities are not prevented  Pain Frequency: Continuous  Pain Onset: On-going  Non-Pharmaceutical Pain Intervention(s): Emotional support    Objective     Cognition  Overall Cognitive Status: WNL  Patient affect[de-identified] Normal  Orientation  Overall Orientation Status: Within Normal Limits         ADL  Feeding  Assistance Level: Modified independent  Skilled Clinical Factors: No assistance needed to open items this date. Grooming/Oral Hygiene  Assistance Level: Supervision  Skilled Clinical Factors: SUP provided while patient stood at sink for denture care and grooming tasks. Pt. uses rw safely and appropriately up to sink side. Upper Extremity Bathing  Assistance Level: Modified independent  Skilled Clinical Factors: Completed while seated on bench in shower. Lower Extremity Bathing  Equipment Provided: Long-handled sponge  Assistance Level: Modified independent  Skilled Clinical Factors: Close SUP provided while standing for washing of juanita area and buttocks. Pt uses grab bar for unilateral support. Upper Extremity Dressing  Assistance Level: Contact guard assist  Skilled Clinical Factors: A managing t-shirt over c-collar. Pt. . wore c collar during shower. Post shower she demonstrated ability to assemble and ronny dry collar. Lower Extremity Dressing  Equipment Provided: Reachers  Assistance Level: Modified independent  Skilled Clinical Factors: pullups and pants, mod indep over hips as well. Putting On/Taking Off Footwear  Assistance Level: Modified independent  Skilled Clinical Factors: slipper socks and shoes (elastic laces) A with teds- will not be wearing at home  Toileting  Assistance Level: Supervision  Toilet Transfers  Technique:  (Ambulating to/from bathroom with rw.)  Equipment: Grab bars;Standard toilet  Additional Factors: Verbal cues  Assistance Level: Stand by assist  Skilled Clinical Factors: slighlty unsteady. no LOB  Tub/Shower Transfers  Type: Shower  Transfer From: Rolling walker (Transferred into shower w/ rw. Out of shower bench to w/c.)  Transfer To: Tub transfer bench  Additional Factors: Verbal cues  Assistance Level: Stand by assist  Skilled Clinical Factors: Pt ambulates into shower with SBA and good self correction for sequencing. Stand pivot to exit shower to simulate at home setup.       Functional Mobility  Device: Rolling walker  Activity: To/From bathroom  Assistance Level: Stand by assist  Skilled Clinical Factors: Unsteady at times but no LOB, Good safety awareness observed,   Bed Mobility  Overall Assistance Level: Modified Independent  Additional Factors: With handrails; Increased time to complete  Supine to Sit  Assistance Level: Modified independent  Skilled Clinical Factors: Increased time, used railing  Scooting  Assistance Level: Independent  Sit to Stand  Assistance Level: Modified independent  Skilled Clinical Factors: No cuing required this date, self-corrected hand placement  Stand to Sit  Assistance Level: Modified independent  Skilled Clinical Factors: No cuing required this date, self-corrected hand placement  Bed To/From Chair  Technique: Stand pivot  Assistance Level: Stand by assist  Stand Pivot  Assistance Level: Stand by assist  Skilled Clinical Factors: RW         Assessment  Assessment  Activity Tolerance: Patient tolerated treatment well  Discharge Recommendations: Home with assist PRN;Patient would benefit from continued therapy after discharge  OT Equipment Recommendations  Equipment Needed: Yes  Mobility Devices: ADL Assistive Devices  ADL Assistive Devices: Reacher;Hand-held Shower  Restraints  Initially in place: No    Patient Education  Education  Education Given To: Patient  Education Provided: Home Exercise Program;ADL Function;DME/Home Modifications; Fall Prevention Strategies  Education Method: Demonstration;Verbal;Printed Information/Hand-outs; Teach Back  Barriers to Learning: None  Education Outcome: Verbalized understanding;Demonstrated understanding    Plan  Plan  Times per Week: 5-7  Times per Day: Twice a day  Current Treatment Recommendations: Self-Care / ADL; Strengthening;ROM;Balance training;Functional mobility training; Endurance training; Wheelchair mobility training    Goals  Patient Goals   Patient goals :  \"To get my body that way so that I can take care of things on my own. \"  Short Term Goals  Time Frame for Short term goals: By 1 week  Short Term Goal 1: Pt will complete lower body dressing/bathing with min A and Good safety with use of AE as needed  Short Term Goal 2: Pt will complete upper body dressing with CGA  and Good safety while maintaining cervical precautions  Short Term Goal 3: Pt will complete functional transfers during self care tasks with min A and Good safety  Short Term Goal 4: Pt will tolerate standing 4+ minutes during functional activity of choice with min A and Good safety  Short Term Goal 5: Pt will verbalize/demonstrate Good understanding of cervical precautions during self care tasks with increase safety and independence with daily activities  Short Term Goal 6: Pt will participate in 30+ mintues of therapeutic exercises/functional activities to increase safety and independence with self care and mobility  Long Term Goals  Time Frame for Long term goals : By discharge/  4-28-22 all STG and LTG are met  Long Term Goal 1: Pt will complete BADLs with modified independence and Good safety while maintaining cervical precautions  Long Term Goal 2: Pt will complete functional transfers during self care tasks with modified independence with Good safety   Long Term Goal 3: Pt will tolerate standing 8+ mintues during functional activity of choice with Good safety  Long Term Goal 4: Pt will verbalize/demonstrate Good understanding of home safety/fall prevention strategies to increase safety and independence with self care and mobility  Long Term Goal 5: Pt will verbalize/demonstrate Good understanding of adaptive strategies/AE/DME to assisit with maintaining cervical precautions and increase safety and independence with self care and mobility  Long Term Goal 6: Pt will complete simple meal prep/light house keeping tasks with CGA and Good safety  Long Term Goal 7: OT to assess FM and  strength    Therapy Time   Individual Concurrent Group Co-treatment   Time In 0804         Time Out [address] [facility]         Minutes [place], HUNG/L

## 2022-04-29 NOTE — PROGRESS NOTES
[facility] Internal Medicine  Peña Eagle MD; Douglas Robles MD; Natalia Porras MD; MD Vlad Chow MD; Hugo Wells MD    [facility]   [address] / HISTORY AND PHYSICAL EXAMINATION            Date:   4/29/2022  Patient name:  Jada Vicente  Date of admission:  4/15/2022  5:32 PM  MRN:   [id number]  Account:  [de-identified]  YOB: 1960  PCP:    Farrukh Giron MD  Room:   [address] [place] [address]  Code Status:    Full Code    Physician Requesting Consult: Milly Sherman MD    Reason for Consult: Medical management    Chief Complaint:     No chief complaint on file. Underwent anterior C5 corpectomy, posterior fixation from C2-T1 with arthrodesis, osteotomy, correction of deformity on April 12    History Obtained From:     patient    History of Present Illness: The patient is a 61 y.o. female presented for elective surgery. Underwent anterior C5 corpectomy, posterior fixation from C2-T1 with arthrodesis, osteotomy, correction of deformity on 4/12 with Dr. Maura Gutierrez 4/12. Symptoms include significant left-sided paresis, severe lower extremity weakness (wheelchair bound, now unable to stand or ambulate [id number]), progressively worsening paresthesias and dexterity issues with left upper extremity and left lower extremity.  Additionally complains of some moderate saddle anesthesia, urge incontinence but no bowel dysfunction.   Admitted to acute rehab for further management for deconditioning,  4/22  No new complaints  Pain is controlled on methadone  Working with therapy      Past Medical History:     Past Medical History:   Diagnosis Date    Anxiety     Arthritis     At maximum risk for fall 12/29/2021    caudal equina syndrome and cervical stenosis    Cancer ([address]     right breast    Cauda equina syndrome ([address] 12/29/2021    neuropathy, mobility difficulty, incontinance    Cervical stenosis of spine 12/29/2021    GERD (gastroesophageal reflux disease)     History of stomach ulcers     Hypertension     Dr. Apple Florez    Hypothyroidism     Lumbar neuritis     Post laminectomy syndrome     Spinal deformity 11/2021    cervical and lumbar    Thyroid disease     Uses wheelchair     Wears dentures     Wellness examination     Dr. Apple Florez        Past Surgical History:     Past Surgical History:   Procedure Laterality Date    BACK SURGERY      lower back x 3, cervical- x 1: C4- C6, 11/4/2014     BREAST SURGERY Right     mastectomy with reconstruction    CERVICAL FUSION N/A 4/12/2022    C5 CORPECTOMY, USE OF INTRAOPERATIVE CERVICAL TRACTION performed by Carolynn Storey DO at [address] [place] [address] N/A 4/12/2022    POSTERIOR FIXATION C2-C7 performed by Carolynn Storey DO at [facility]  about 2018    HERNIA REPAIR Bilateral     inguinal    HYSTERECTOMY, TOTAL ABDOMINAL      LUMBAR SPINE SURGERY N/A 12/30/2021    LUMBAR LAMINECTOMY L2-S1 performed by Carolynn Storey DO at [address] [facility]  04/12/2022    C5 CORPECTOMY, USE OF INTRAOPERATIVE CERVICAL TRACTION (N/A Spine Cervical)         Medications Prior to Admission:     Prior to Admission medications    Medication Sig Start Date End Date Taking? Authorizing Provider   HYDROcodone-acetaminophen (NORCO) 7.5-325 MG per tablet Take 1 tablet by mouth every 8 hours as needed for Pain for up to 7 days. Up to three tablets a day.  4/28/22 5/5/22 Yes Rupali Bailey MD   lisinopril (PRINIVIL;ZESTRIL) 5 MG tablet Take 1 tablet by mouth daily 4/29/22  Yes Rupali Bailey MD   dilTIAZem (CARDIZEM CD) 180 MG extended release capsule Take 1 capsule by mouth daily 4/29/22  Yes Rupali Bailey MD   fluticasone (FLONASE) 50 MCG/ACT nasal spray 1 spray by Each Nostril route daily 4/29/22  Yes Rupali Bailey MD   neomycin-bacitracin-polymyxin (NEOSPORIN) 400-5-5000 ointment Apply topically 2 times daily. 4/28/22 5/8/22 Yes Claudette Bump, MD   bisacodyl (DULCOLAX) 10 MG suppository Place 1 suppository rectally every 48 hours 4/29/22 5/29/22 Yes Claudette Bump, MD   docusate sodium (COLACE) 100 MG capsule Take 2 capsules by mouth 2 times daily 4/28/22  Yes Claudette Bump, MD   senna (SENOKOT) 8.6 MG tablet Take 2 tablets by mouth daily as needed (Constipation) 4/28/22 5/28/22 Yes Claudette Bump, MD   baclofen (LIORESAL) 10 MG tablet Take 1 tablet by mouth 4 times daily 4/28/22  Yes Claudette Bump, MD   levothyroxine (SYNTHROID) 100 MCG tablet Take 1 tablet by mouth Daily 4/29/22  Yes Claudette Bump, MD   pantoprazole (PROTONIX) 40 MG tablet Take 1 tablet by mouth daily 4/29/22  Yes Claudette Bump, MD   ferrous sulfate (IRON 325) 325 (65 Fe) MG tablet Take 1 tablet by mouth 2 times daily (with meals) 1/13/22   Claudette Bump, MD   busPIRone (BUSPAR) 30 MG tablet Take 30 mg by mouth 2 times daily 12/17/21   Mauri Strickland MD   Vitamin D, Ergocalciferol, 50 MCG (7689 UT) CAPS TAKE ONE CAPSULE BY MOUTH DAILY 3/29/21   Madisyn Bolaños PA-C   methadone (DOLOPHINE) 10 MG tablet Take 10 mg by mouth 2 times daily. Historical Provider, MD        Allergies:     Latex and Kiwi extract    Social History:     Tobacco:    reports that she quit smoking about 8 years ago. She has a 15.00 pack-year smoking history. She has never used smokeless tobacco.  Alcohol:      reports current alcohol use. Drug Use:  reports no history of drug use. Family History:     Family History   Problem Relation Age of Onset    Hypertension Mother     Heart Disease Mother     Cancer Mother        Review of Systems:     Positive and Negative as described in HPI. CONSTITUTIONAL:  negative for fevers, chills, sweats, fatigue, weight loss  HEENT:  negative for vision, hearing changes, runny nose, throat pain  RESPIRATORY:  negative for shortness of breath, cough, congestion, wheezing.   CARDIOVASCULAR:  negative for chest pain, palpitations. GASTROINTESTINAL:  negative for nausea, vomiting, diarrhea, constipation, change in bowel habits, abdominal pain   GENITOURINARY:  negative for difficulty of urination, burning with urination, frequency   INTEGUMENT:  negative for rash, skin lesions, easy bruising   HEMATOLOGIC/LYMPHATIC:  negative for swelling/edema   ALLERGIC/IMMUNOLOGIC:  negative for urticaria , itching  ENDOCRINE:  negative increase in drinking, increase in urination, hot or cold intolerance  MUSCULOSKELETAL:  negative joint pains, muscle aches, swelling of joints  NEUROLOGICAL:  negative for headaches, dizziness, lightheadedness, numbness, pain, tingling extremities  BEHAVIOR/PSYCH:  negative for depression, anxiety    Physical Exam:     BP (!) 149/87   Pulse 84   Temp 97.7 °F (36.5 °C)   Resp 16   Ht 5' 1\" (1.549 m)   Wt 140 lb (63.5 kg)   SpO2 96%   BMI 26.45 kg/m²   Temp (24hrs), Av.8 °F (36.6 °C), Min:97.7 °F (36.5 °C), Max:97.9 °F (36.6 °C)    No results for input(s): POCGLU in the last 72 hours. No intake or output data in the 24 hours ending 22 1113    General Appearance:  alert, well appearing, and in no acute distress  Mental status: oriented to person, place, and time with normal affect  Head:  normocephalic, atraumatic. Eye: no icterus, redness, pupils equal and reactive, extraocular eye movements intact, conjunctiva clear  Ear: normal external ear, no discharge, hearing intact  Nose:  no drainage noted  Mouth: mucous membranes moist  Neck: supple, no carotid bruits, thyroid not palpable  Lungs: Bilateral equal air entry, clear to ausculation, no wheezing, rales or rhonchi, normal effort  Cardiovascular: normal rate, regular rhythm, no murmur, gallop, rub.   Abdomen: Soft, nontender, nondistended, normal bowel sounds, no hepatomegaly or splenomegaly  Neurologic: There are no new focal motor or sensory deficits, normal muscle tone and bulk, no abnormal sensation, normal speech, cranial nerves II through XII grossly intact  Skin: No gross lesions, rashes, bruising or bleeding on exposed skin area  Extremities:  peripheral pulses palpable, no pedal edema or calf pain with palpation  Psych: normal affect    Investigations:      Laboratory Testing:  No results found for this or any previous visit (from the past 24 hour(s)). Imaging/[facility]:  XR CERVICAL SPINE (2-3 VIEWS)    Result Date: 4/13/2022  Expected postoperative findings status post anterior and posterior cervical fusion. XR CHEST PORTABLE    Result Date: 4/13/2022  No acute cardiopulmonary disease. XR CERVICAL SPINE FLEXION AND EXTENSION    Result Date: 4/13/2022  1. Retrolisthesis of C2 on C3 measures 2 mm on extension view and reduces on flexion view. 2. No evidence of instability of anterolisthesis of C3 on C4 measuring 1.5 mm. 3. No acute fracture on limited views. Of note, the C6-C7 and C7-T1 levels are not well seen due to overlying soft tissues. 4. Severe multilevel degenerative changes. CTA NECK W CONTRAST    Result Date: 4/12/2022  Unremarkable CTA of the neck. Minor hazy pleural thickening and chronic-appearing medial right upper lobe atelectasis/scarring. If there is any clinical concern, follow-up noncontrast chest CT may be useful. RECOMMENDATIONS: Unavailable       Assessment :      Hospital Problems           Last Modified POA    * (Principal) Spinal cord injury, cervical region, sequela ([address] 4/15/2022 Yes    Essential hypertension 4/16/2022 Yes    Hypothyroidism 4/16/2022 Yes    Post-menopausal osteoporosis 4/16/2022 Yes    Cervical myelopathy ([address] 4/16/2022 Yes    Anemia, normocytic normochromic 4/16/2022 Yes          Plan:     1. Spinal cord injury status post anterior C5 corpectomy, posterior fixation from C2-T1 with arthrodesis, osteotomy,  2. Essential hypertension,  Controlled, continue monitor blood pressure, continue home medications, added lisinopril, diltiazem,Controlled   3.  Hyponatremia, better now ,  4. Constipation , prn meds   5. Hypothyroidism, continue levothyroxine,  6. Continue physical therapy,  7. DVT prophylaxis    Consultations:   IP CONSULT TO DIETITIAN  IP CONSULT TO SOCIAL WORK  IP CONSULT TO INTERNAL MEDICINE  INPATIENT CONSULT TO ORTHOTIST/PROSTHETIST      4/24/22    · TSH 16 ,free t4 low normal .  · Increase dose levothyroxine to 100 mcg daily  1. Progressing satisfactory with rehab therapies . 4/25  Tolerating higher dose Synthroid, TSH to be repeated in 3 months outpatient     4/29  Recent readings of blood pressure little high- will monitor for now  Patient reports no new complaints. Engaging with physical therapy. Labs and vitals reviewed. No new issues. Continue with current care. Alvarez Edwards MD  4/29/2022  11:13 AM    Copy sent to Dr. Gypsy Baron MD    Please note that this chart was generated using voice recognition Dragon dictation software. Although every effort was made to ensure the accuracy of this automated transcription, some errors in transcription may have occurred.

## 2022-04-29 NOTE — DISCHARGE SUMMARY
Physical Medicine & Rehabilitation  Discharge Summary     Patient Identification:  Fannie Hedrick  : 1960  Admit date: 4/15/2022  Discharge date: 2022   Attending provider: Giovanna Simon MD      Discharging provider: Luberta Osler, MD    Primary care provider: Ariana Beach MD     Discharge Diagnoses:   Nontraumatic cervical spinal cord injury secondary to critical cervical stenosis  Muscle spasms  Hx cauda Equina  L foot drop  HTN  Anemia  Hypothyroidism  Hyponatremia  Hyponatremia  GERD  Allergic Rhinitis  Vitamin D deficiency    Discharge Functional Status:    Physical therapy:  Bed Mobility: Supine to Sit: Minimal assistance  Sit to Supine: Minimal assistance  Scooting: Stand by assistance  Transfers: Sit to Stand: Stand by assistance  Stand to sit: Stand by assistance  Bed to Chair: Stand by assistance  Comment: Breaks given PRN, Ambulation  Surface: level tile  Device: Rolling Walker  Other Apparatus: Wheelchair follow  Assistance: Stand by assistance,Contact guard assistance (SBA for shorter distances and CGA for longer distances )  Quality of Gait: Pt demos decrease LLE cordination, internal rotation with toe off, and decrease step height. Occassional NBOS noted. Gait Deviations: Slow Sharon,Decreased step length,Decreased arm swing  Distance: 2MWT: 80' and finished amb totaling 208' in 5 min and 12 sec. Comments: Standing rest breaks PRN. More Ambulation?: No, Stairs  # Steps : 13  Stairs Height:  (4\"/6\")  Rails: Bilateral  Curbs: 6\"  Device: No Device,Rolling walker  Assistance: Contact guard assistance,Minimal assistance (CGA for training stairs and Min A for curb step)  Comment: Completed in a step to pattern. Pt occassionally demos NBOS however able to correct with cues. Curb step: Pt is edu on proper technique with RW. Pt requires Min A for safety to complete. Slight increase time required due to safety.   (standing rest breaks PRN. )  Mobility:  , PT Equipment Recommendations  Equipment Needed: No  Other: Pt has rolling walker, rollator, manual wheelchair and lift chair at home, Assessment:  pt will continue to benefit from PT to progress activity and promote safety with gait and transfers    Occupational therapy:  , Equipment Recommendations  Equipment Needed:  (TBD), Assessment: Pt would benefit from further skilled OT to address deficits and increase safety and independence with daily activities. Speech therapy:         Inpatient Rehabilitation Course:   Moss Crigler is a 62 y.o. female admitted to inpatient rehabilitation on 4/15/2022 for rehab for cervical spinal cord injury secondary to severe stenosis. INITIAL HPI:  She was originally admitted to [facility] 4/11/2022.       She initially presented for planned surgical intervention for cervical stenosis.  She underwent C5 corpectomy with C4-C6 arthrodesis, open reduction of cervical kyphotic deformity, and C2-C7 posterior fusion on 4/12/22 (Dr. Marla Lynch). Greta Bennett has a cervical collar when out of bed. Patient evaluated today:  GEN: Well developed, well nourished, in NAD  HEENT:  NCAT.  PERRL.  EOMI.  Mucous membranes pink and moist. Hard cervical collar in place  PULM:  Clear to ausculation. No rales or rhonchi. Respirations WNL and unlabored. CV:  Regular rate rhythm.  No murmurs or gallops. GI:  Abdomen soft. Tender, distended.  BS + and equal.    NEUROLOGICAL: A&O x3. Sensation intact to light touch. .   MSK:  Functional ROM BUE and BLEs. Motor testing 4/5 key muscles BUEs, 4-/5 B hip flexion, 4-/5 R knee extension, 4-/5 L knee extension, 4-/5 R dorsiflexion, 3/5 L dorsiflexion.    SKIN: Warm dry and intact. Good turgor. Posterior cervical spine incision well approximated with some erythema laterally to incision itself. No induration or drainage. EXTREMITIES:  No calf tenderness to palpation. No edema BLEs. PSYCH: Mood WNL. Appropriately interactive. Affect WNL.      Rehab course:   Patient's pain was controlled with prn oxycodone and her home dose of methadone. She did have issues with constipation which responded well to prn Dulcolax. Her muscle spasms did not respond to Robaxin. She responded well to baclofen. Liver function was monitored during admission and remained stable. Her hemoglobin was low during admission but stable. An orthotist measured her for L AFO for longstanding history of foot drop. Allergic rhinitis was managed with Flonase. Internal medicine checked TSH and Free T4 then increased her levothyroxine to 100 mcg daily. Sutures were removed 4/28/22 after review with Dr. Jeronimo Rivera. Chronic conditions were stable on home medications. The patient participated in an aggressive multidisciplinary inpatient rehabilitation program involving 3 hours per day, 5 days per week of rehabilitation. Patient benefited from inpatient rehab and was discharged in good stable condition. Consults:   Internal Medicine    Significant Diagnostics:   CBC:   Lab Results   Component Value Date    WBC 3.9 04/23/2022    RBC 3.23 04/23/2022    HGB 9.5 04/23/2022    HCT 28.5 04/23/2022    MCV 88.1 04/23/2022    MCH 29.5 04/23/2022    MCHC 33.4 04/23/2022    RDW 21.3 04/23/2022     04/23/2022    MPV 6.8 04/23/2022     CMP:    Lab Results   Component Value Date     04/23/2022    K 4.1 04/23/2022    CL 99 04/23/2022    CO2 27 04/23/2022    BUN 7 04/23/2022    CREATININE 0.59 04/23/2022    GFRAA >60 04/23/2022    LABGLOM >60 04/23/2022    GLUCOSE 94 04/23/2022    PROT 7.1 04/13/2022    LABALBU 3.9 04/13/2022    CALCIUM 8.9 04/23/2022    BILITOT 0.37 04/13/2022    ALKPHOS 78 04/13/2022    AST 31 04/13/2022    ALT 13 04/13/2022     TSH:    Lab Results   Component Value Date    TSH 16.42 04/24/2022         Patient Instructions:    Cervical collar until cleared by neurosurgery. No driving until cleared by neurosurgery.     Medications, precautions and follow up reviewed with patient and family    Follow-up visits: See after visit summary from hospitalization: Dr. Mahogany Hancock in 1 week, pain management in 1 week, PCP 1-2 weeks, Dr. Gabino Saldivar 4-6 weeks    Disposition:  Home with outpatient therapies. Discharge Medications:  Pain management physician to manage/refill Methadone for home. Medication List      START taking these medications    baclofen 10 MG tablet  Commonly known as: LIORESAL  Take 1 tablet by mouth 4 times daily     bisacodyl 10 MG suppository  Commonly known as: DULCOLAX  Place 1 suppository rectally every 48 hours  Start taking on: April 29, 2022     dilTIAZem 180 MG extended release capsule  Commonly known as: CARDIZEM CD  Take 1 capsule by mouth daily  Start taking on: April 29, 2022  Replaces: dilTIAZem 180 MG extended release capsule     fluticasone 50 MCG/ACT nasal spray  Commonly known as: FLONASE  1 spray by Each Nostril route daily  Start taking on: April 29, 2022     lisinopril 5 MG tablet  Commonly known as: PRINIVIL;ZESTRIL  Take 1 tablet by mouth daily  Start taking on: April 29, 2022     neomycin-bacitracin-polymyxin 400-5-5000 ointment  Commonly known as: NEOSPORIN  Apply topically 2 times daily. senna 8.6 MG tablet  Commonly known as: SENOKOT  Take 2 tablets by mouth daily as needed (Constipation)        CHANGE how you take these medications    HYDROcodone-acetaminophen 7.5-325 MG per tablet  Commonly known as: NORCO  Take 1 tablet by mouth every 8 hours as needed for Pain for up to 7 days. Up to three tablets a day. What changed:   · how much to take  · how to take this  · when to take this  · reasons to take this     levothyroxine 100 MCG tablet  Commonly known as: SYNTHROID  Take 1 tablet by mouth Daily  Start taking on: April 29, 2022  What changed:   · medication strength  · how much to take     pantoprazole 40 MG tablet  Commonly known as: PROTONIX  Take 1 tablet by mouth daily  Start taking on: April 29, 2022  What changed: See the new instructions. CONTINUE taking these medications    busPIRone 30 MG tablet  Commonly known as: BUSPAR  Take 30 mg by mouth 2 times daily     docusate sodium 100 MG capsule  Commonly known as: COLACE  Take 2 capsules by mouth 2 times daily     ferrous sulfate 325 (65 Fe) MG tablet  Commonly known as: IRON 325  Take 1 tablet by mouth 2 times daily (with meals)     methadone 10 MG tablet  Commonly known as: DOLOPHINE     Vitamin D (Ergocalciferol) 50 MCG (2000 UT) Caps  TAKE ONE CAPSULE BY MOUTH DAILY        STOP taking these medications    dilTIAZem 180 MG extended release capsule  Commonly known as: DILACOR XR  Replaced by: dilTIAZem 180 MG extended release capsule     tiZANidine 4 MG tablet  Commonly known as: Nicole Westbrook           Where to Get Your Medications      These medications were sent to [address], [address], [address] [place] [id number]    Phone: [phone]   · baclofen 10 MG tablet  · bisacodyl 10 MG suppository  · dilTIAZem 180 MG extended release capsule  · docusate sodium 100 MG capsule  · HYDROcodone-acetaminophen 7.5-325 MG per tablet  · levothyroxine 100 MCG tablet  · lisinopril 5 MG tablet  · pantoprazole 40 MG tablet  · senna 8.6 MG tablet     You can get these medications from any pharmacy    You don't need a prescription for these medications  · fluticasone 50 MCG/ACT nasal spray  · neomycin-bacitracin-polymyxin 400-5-5000 ointment                Isabel Field MD

## 2022-04-29 NOTE — PROGRESS NOTES
Physical Therapy  Facility/Department: [facility] ACUTE REHAB      NAME: Santi Polk  : 1960 (62 y.o.)  MRN: [id number]  CODE STATUS: Prior    Date of Service: 22      Past Medical History:   Diagnosis Date    Anxiety     Arthritis     At maximum risk for fall 2021    caudal equina syndrome and cervical stenosis    Cancer (HCC)     right breast    Cauda equina syndrome ([address] 2021    neuropathy, mobility difficulty, incontinance    Cervical stenosis of spine 2021    GERD (gastroesophageal reflux disease)     History of stomach ulcers     Hypertension     Dr. Pretty Kern    Hypothyroidism     Lumbar neuritis     Post laminectomy syndrome     Spinal deformity 2021    cervical and lumbar    Thyroid disease     Uses wheelchair     Wears dentures     Wellness examination     Dr. Pretty Kern     Past Surgical History:   Procedure Laterality Date    BACK SURGERY      lower back x 3, cervical- x 1: C4- C6, 2014     BREAST SURGERY Right     mastectomy with reconstruction    CERVICAL FUSION N/A 2022    C5 CORPECTOMY, USE OF INTRAOPERATIVE CERVICAL TRACTION performed by Daniel El DO at [address] [place] [address] N/A 2022    POSTERIOR FIXATION C2-C7 performed by Daniel El DO at [address] [place] [address]  about 2018    HERNIA REPAIR Bilateral     inguinal    HYSTERECTOMY, TOTAL ABDOMINAL      LUMBAR SPINE SURGERY N/A 2021    LUMBAR LAMINECTOMY L2-S1 performed by Daniel El DO at [facility] HISTORY  2022    C5 CORPECTOMY, USE OF INTRAOPERATIVE CERVICAL TRACTION (N/A Spine Cervical)        Chart Reviewed: Yes  Patient assessed for rehabilitation services?: Yes  Additional Pertinent Hx: Santi Polk is a 62 y.o. female with history of cauda equina syndrome s/p L2-S1 laminectomy (2021), HTN, hypothyroidism, GERD, breast cancer, and anxiety admitted to [facility] on 2022.  She presented for planned surgical intervention for cervical stenosis. She underwent C5 corpectomy with C4-C6 arthrodesis, open reduction of cervical kyphotic deformity, and C2-C7 posterior fusion on 4/12/22 (Dr. Marla Coyle). She has a cervical collar when out of bed. After her lumbar surgery in Dec 2021, patient had been improving initially with therapy, however overall has regressed. . Pt admitted to rehab unit on 4/15/22. Family / Caregiver Present: No  Referral Date : 04/15/22  Diagnosis: Nontraumatic cervical spinal cord injury secondary to cervical stenosis s/p C5 corpectomy with C4-C6 arthrodesis, open reduction of cervical kyphotic deformity, and C2-C7 posterior fusion    Restrictions:  Restrictions/Precautions: Fall Risk;General Precautions  Required Braces or Orthoses  Cervical: c-collar  Position Activity Restriction  Other position/activity restrictions: s/p C2-C7 fixation     SUBJECTIVE  Subjective: Patient reports she is excited to be going home this PM.  Family training completed this A.M. Pain: Reports continued pain through R side of neck/shoulder              Prior Level of Function:  Social/Functional History  Lives With: Significant other,Family (Partner Karthikeyan Pederson) and 15 yo grandson Julienne Osorio))  Type of Home: House  Home Layout: Two level,Performs ADL's on one level,Able to Live on Main level with bedroom/bathroom (\"we don't go upstairs\")  Home Access: Stairs to enter without rails  Entrance Stairs - Number of Steps: 1 curb step (pt receives help getting up steps)  Entrance Stairs - Rails: None  Bathroom Shower/Tub: Tub/Shower unit,Shower chair with back,Doors  Bathroom Toilet: Handicap height  Bathroom Equipment: Grab bars in shower,Tub transfer bench (Sink counter on the side of the toilet)  Bathroom Accessibility: Accessible  Home Equipment:  (began using w/c last 2 weeks)  ADL Assistance: Independent (A with getting into shower;  Increased A 2 weeks prior to sx)  Homemaking Assistance: Needs assistance (Share responsibilities prior; Increased A 2 wks prior to sx)  Homemaking Responsibilities: Yes (Constance Palacios prior to decline; increased A prior to sx)  Meal Prep Responsibility: Secondary  Laundry Responsibility: Secondary  Cleaning Responsibility: Secondary  Shopping Responsibility: Secondary  Ambulation Assistance: Independent (IND prior to decline; Sitting on 4WW with SO pushing prior to sx)  Transfer Assistance: Independent (IND prior to decline; A from family over the last month)  Active : No  Patient's  Info: pt partner and grandson drive  Mode of Transportation: Iman Alfredo (Pt reports partner and grandson assist with vehicle transfers from w/c)  Occupation: On disability  Leisure & Hobbies: gardening, watching tv,\"did whatever made me happy. \"  IADL Comments: pt sleeps in recliner/lift chair at baseline  Additional Comments: Pt reports that she was independent after discharge from ARU up until about 1 month prior to sx with progressive decline requiring increased A from family. Pt reports needing max assist for all bathing, dressing, toileting, and self-care ADLs for approximately 2-4 weeks prior to sx; pt needed some assistance with ADLs ~2-4 weeks ago. Pt reports sponge-bathing in bathroom due to decline. Pt was using 4WW to sit and have partner push around priorto getting w/c and used w/c prior to sx for about 2 weeks around the house and in the community, receiving assist from partner and grandson for mobility and transfers; pt reports using RW >2-4 weeks prior to sx for func mob. Pt partner works full time days and grandson is currently in online school and is able to assist as needed.        OBJECTIVE  Vision  Vision Exceptions: Wears glasses for reading    Hearing  Hearing: Within functional limits    Cognition  Overall Cognitive Status: WNL  Sensation  Overall Sensation Status: Impaired (Bilateral hand and feet; pt reports improvement in sensation)    Functional Mobility  Bed mobility  Rolling to Left: Supervision  Rolling to Right: Supervision  Supine to Sit: Supervision  Sit to Supine: Supervision  Scooting: Supervision  Transfers  Sit to Stand: Supervision  Stand to sit: Modified independent  Bed to Chair: Supervision  Comment: Completed with RW. Environmental Mobility  Ambulation  Surface: level tile  Device: Rolling Walker  Other Apparatus: Left;AFO  Assistance: Stand by assistance (SBA for shorter distances and CGA for longer distances )  Quality of Gait: Pt demos decrease LLE cordination, internal rotation with toe off, and decrease step height. Occassional NBOS noted. Gait Deviations: Slow Sharon;Decreased step length;Decreased arm swing  Distance: 200 ft;   short distances within gym. Comments: Standing rest breaks PRN. More Ambulation?: No  Stairs/Curb  Stairs?: Yes        04/29/22 1891   Stairs/Curb   Stairs? Yes   Stairs   # Steps  15   Stairs Height   (4\"/6\")   Rails Bilateral   Curbs 6\"   Device No Device  (AFO L LE)   Assistance Stand by assistance   Comment Demonstrated safe step to pattern. Instructed to increase hip flexion when ascending vs. circumduction.   (standing rest breaks PRN. )       PT Exercises  Exercise Treatment: Reviewed HEP    ASSESSMENT  Vitals  O2 Device: None (Room air)    Activity Tolerance  Activity Tolerance: Patient tolerated treatment well    Assessment  Assessment:  pt will continue to benefit from PT to progress activity and promote safety with gait and transfers  Treatment Diagnosis: B LE/B UE weakness, difficulty walking  Therapy Prognosis: Good  Decision Making: High Complexity  Barriers to Learning: none  Discharge Recommendations: Home with assist PRN;Patient would benefit from continued therapy after discharge  PT D/C Equipment  Other: Pt has rolling walker, rollator, manual wheelchair and lift chair at home  PT Equipment Recommendations  Equipment Needed: No  Other: Pt has rolling walker, rollator, manual wheelchair and lift chair at home      GOALS  Patient Goals   Patient goals : Be able to walk and do things for myself  Short Term Goals  Time Frame for Short term goals: 10 days  Short term goal 1: Pt will perform bed mobility/rolling with SBA  Short term goal 2: Pt will perform transfers with Miryam  Short term goal 3: Pt will ambulate 50 to 100 ft, with rolling walker, Miryam. Short term goal 4: Pt will perform wheelchair mobility for 150 ft, SB stright path, min A for corners. Short term goal 5: Pt able to perform 3 to 5 steps with 2 rails, min/mod A  Long Term Goals  Time Frame for Long term goals : By DC  Long term goal 1: Pt able to perform sit<>stand transfers, mod-I  Long term goal 2: Pt able to perform pivot transfers at SBA/supervsion. Long term goal 3: Pt able to ambulate with rolling walker 100 to 150 ft , level surfaces, with rolling walker, CGA. Long term goal 4: Pt able to perform curb step with UE support at [address] term goal 5: Pt able to perform 5 to 12 steps with 2 rails at min A to improve LE strength/coordination  Long term goal 6: Pt able to propel w/c  distance of 150 ft SBA level surfaces  Long term goal 7: Pt able to ambulate on outside terraine/incline surface at min A with rolling walker  Long term goal 8: Pt able to ambulate distance of 60 to 70 ft for 2MWT to improve fucntion and gait speed. Long term goal 5: Family training for safe functional mobility for DC home      Plan  Current Treatment Recommendations: Strengthening;ROM;Balance training;Functional mobility training; Endurance training;ADL/Self-care training;Transfer training;Stair training;Home exercise program;Safety education & training;Patient/Caregiver education & training;Equipment evaluation, education, & procurement; Neuromuscular re-education;Gait training  Safety Devices  Type of Devices: All fall risk precautions in place;Call light within reach;Gait belt;Patient at risk for falls;Nurse notified; Left in chair  Restraints  Restraints Initially in Place:  No    Therapy Time     04/29/22 0905   PT Individual Minutes   Time In 0733   Time Out 0940   Minutes [address], [place], 04/29/22 at 5:53 PM

## 2022-04-29 NOTE — PLAN OF CARE
Problem: Skin Integrity:  Goal: Will show no infection signs and symptoms  Description: Will show no infection signs and symptoms  Outcome: Completed  Goal: Absence of new skin breakdown  Description: Absence of new skin breakdown  Outcome: Completed     Problem: Falls - Risk of:  Goal: Will remain free from falls  Description: Will remain free from falls  Outcome: Completed  Goal: Absence of physical injury  Description: Absence of physical injury  Outcome: Completed     Problem: Discharge Planning:  Goal: Discharged to appropriate level of care  Description: Discharged to appropriate level of care  Outcome: Completed     Problem: Pain:  Goal: Pain level will decrease  Description: Pain level will decrease  Outcome: Completed  Goal: Control of acute pain  Description: Control of acute pain  Outcome: Completed  Goal: Control of chronic pain  Description: Control of chronic pain  Outcome: Completed     Problem: Nutrition  Goal: Optimal nutrition therapy  Outcome: Completed     Problem: Discharge Planning  Goal: Discharge to home or other facility with appropriate resources  Outcome: Completed     Problem: ABCDS Injury Assessment  Goal: Absence of physical injury  Outcome: Completed

## 2022-04-29 NOTE — PROGRESS NOTES
Discharged per concepcion to private car.   dischrge instructions given and verbalized underrstanding

## 2022-05-02 ENCOUNTER — CARE COORDINATION (OUTPATIENT)
Dept: CARE COORDINATION | Age: 62
End: 2022-05-02

## 2022-05-02 NOTE — CARE COORDINATION
Left VM message asking patient to call me back at [phone] for hospital follow up, discuss care management enrollment. Will call again tomorrow.

## 2022-05-03 ENCOUNTER — TELEPHONE (OUTPATIENT)
Dept: PRIMARY CARE CLINIC | Age: 62
End: 2022-05-03

## 2022-05-03 SDOH — ECONOMIC STABILITY: FOOD INSECURITY: WITHIN THE PAST 12 MONTHS, YOU WORRIED THAT YOUR FOOD WOULD RUN OUT BEFORE YOU GOT MONEY TO BUY MORE.: NEVER TRUE

## 2022-05-03 SDOH — ECONOMIC STABILITY: HOUSING INSECURITY
IN THE LAST 12 MONTHS, WAS THERE A TIME WHEN YOU DID NOT HAVE A STEADY PLACE TO SLEEP OR SLEPT IN A SHELTER (INCLUDING NOW)?: NO

## 2022-05-03 SDOH — ECONOMIC STABILITY: HOUSING INSECURITY: IN THE LAST 12 MONTHS, HOW MANY PLACES HAVE YOU LIVED?: 1

## 2022-05-03 SDOH — ECONOMIC STABILITY: FOOD INSECURITY: WITHIN THE PAST 12 MONTHS, THE FOOD YOU BOUGHT JUST DIDN'T LAST AND YOU DIDN'T HAVE MONEY TO GET MORE.: NEVER TRUE

## 2022-05-03 SDOH — ECONOMIC STABILITY: INCOME INSECURITY: IN THE LAST 12 MONTHS, WAS THERE A TIME WHEN YOU WERE NOT ABLE TO PAY THE MORTGAGE OR RENT ON TIME?: NO

## 2022-05-03 ASSESSMENT — SOCIAL DETERMINANTS OF HEALTH (SDOH)
HOW OFTEN DO YOU ATTENT MEETINGS OF THE CLUB OR ORGANIZATION YOU BELONG TO?: NEVER
DO YOU BELONG TO ANY CLUBS OR ORGANIZATIONS SUCH AS CHURCH GROUPS UNIONS, FRATERNAL OR ATHLETIC GROUPS, OR SCHOOL GROUPS?: NO
HOW OFTEN DO YOU GET TOGETHER WITH FRIENDS OR RELATIVES?: TWICE A WEEK
HOW OFTEN DO YOU ATTEND CHURCH OR RELIGIOUS SERVICES?: NEVER
IN A TYPICAL WEEK, HOW MANY TIMES DO YOU TALK ON THE PHONE WITH FAMILY, FRIENDS, OR NEIGHBORS?: THREE TIMES A WEEK
HOW HARD IS IT FOR YOU TO PAY FOR THE VERY BASICS LIKE FOOD, HOUSING, MEDICAL CARE, AND HEATING?: NOT VERY HARD

## 2022-05-03 ASSESSMENT — LIFESTYLE VARIABLES
HOW OFTEN DO YOU HAVE A DRINK CONTAINING ALCOHOL: 2-4 TIMES A MONTH
HOW MANY STANDARD DRINKS CONTAINING ALCOHOL DO YOU HAVE ON A TYPICAL DAY: 1 OR 2

## 2022-05-03 NOTE — ACP (ADVANCE CARE PLANNING)
Advance Care Planning   Healthcare Decision Maker:    Primary Decision Maker: Thalia Patel Child - [phone]    Secondary Decision Maker: Judit Lim - Domestic Partner - [phone]    Click here to complete Healthcare Decision Makers including selection of the Healthcare Decision Maker Relationship (ie \"Primary\"). Today we documented Decision Maker(s) consistent with Legal Next of Kin hierarchy. she has a living will, encouraged she review and bring copy to PCP office so can be scanned into her chart.

## 2022-05-03 NOTE — CARE COORDINATION
Thai Cummins 1626 IP Discharge Follow up Call    Date of discharge: 4/29/22  Facility: [address] [place] [address]  Non-face-to-face services provided:  Education of patient/family/caregiver/guardian to support self-management-symptom monitoring, review treatment plan  Assessment and support for treatment adherence and medication management-all medications reviewed    Reason for Hospital Visit:  Cervical spine surgery for cauda equina compression  Discharged with Home Health?:  No    Date of first visit after discharge:  5/5/22 at neurosurgery office  Status:     improved    Did you receive a discharge summary with list of medication from the hospital? Yes  Review of Instructions:     Understands what to report/when to return?:  Yes   Understands discharge instructions?:  Yes   Following discharge instructions?:  Yes   If not why? Is there any lingering symptoms? Yes  Pain, tingling  Are you eating and drinking OK? Yes  Any other problems i.e. Constipation, other symptoms? No  Are there any new complaints of pain? No  If you have a wound is the dressing clean, dry, and intact? N/A  Understands wound care regimen? Yes  Are there any other complaints/concerns that you wish to tell your provider? no    New Medications at discharge?:     Yes                    Medication Reconciliation by phone -     Each medication was reviewed (both pre and post hospitalization)  Yes    Were there discrepancies in medications? No  If YES, were discrepancies addressed? Not Applicable    Understands Medications? Yes   Questions about medications from pt or caregiver? Not Applicable   Questions addressed? Not Applicable                Has all of medication and/or prescriptions   Yes  Taking Medications? Yes  Can you swallow your pills? Yes    Is the patient having any trouble with ADL's or IADL's? No  Is the patient able to move around as expected? Yes  Needs Equipment?   No    Link to services in community?: No   Which services:  NA    Ambulatory Care Coordination Note  5/3/2022  CM Risk Score: 1  Charlson 10 Year Mortality Risk Score: 47%     ACC: Constantine Kaiser RN    Summary Note: Enrolled in care management. History of spinal cord injury causing cauda equina syndrome, had cervical fusion surgery 4/12, went to acute rehab unit for 2 weeks. Is home now, will have evals for PT and OT next week. She is using a walker, has an AFO brace left leg. She had to get different shoes, half size bigger, then [facility] will adjust the brace again. She has been trying to contact them. Anterior and posterior neck incisions open to air, sutures removed in hospital, she thinks she has glue there now. Applying neosporin zheng twice daily as directed. Was having a lot of pain, unstable gait, falls, problems with bowels and bladder prior to surgery. History lumbar laminectomy and prior cervical spine surgeries. Legs are still weak, has intermittent pain from neck down to feet, tingling of hands and legs. No falls since being home but hasn't been doing any exercises so leg feeling weaker. Encouraged to ambulate short distances every 2 hours and do leg and arm exercises a few times a day until can start PT. No bowel or bladder problems right now. Takes stool softener and laxative daily for constipation prevention since takes iron supplements and narcotic pain medication. Appt at [facility] 5/17, has been going there a long time, takes methadone twice daily prescribed by them. HTN- was added lisinopril and Cardizem dose increased in hospital. She was on potassium supplement but was discontinued in hospital.   All medications reviewed. She does not have any prescription drug coverage but gets discounted prescriptions at [facility] so able to afford all copays. Lives with significant other Debra Orozco, 16year old grandson also lives with them so she has help at home. Daughter lives in [place].  Debra Orozco drives her to all appts, no financial needs. Reviewed appts, she doesn't want to schedule PCP appt right now, follow up at neurosurgery office this week. CC Plan:   -Follow in a week to check on symptoms, review therapy evals. General Assessment    Do you have any symptoms that are causing concern?: Yes  Progression since Onset: Unchanged  Reported Symptoms: Pain, Other (Comment: tingling hands and legs)           Ambulatory Care Coordination Assessment    Care Coordination Protocol  Referral from Primary Care Provider: No  Week 1 - Initial Assessment     Do you have all of your prescriptions and are they filled?: Yes (Comment: she manages them herself)  Barriers to medication adherence: None  Are you able to afford your medications?: Yes  How often do you have trouble taking your medications the way you have been told to take them?: I always take them as prescribed. Do you have Home O2 Therapy?: No      Ability to seek help/take action for Emergent Urgent situations i.e. fire, crime, inclement weather or health crisis. : Independent  Ability to ambulate to restroom: Independent  Ability handle personal hygeine needs (bathing/dressing/grooming): Independent  Ability to manage Medications: Independent  Ability to prepare Food Preparation: Needs Assistance  Ability to maintain home (clean home, laundry): Needs Assistance  Ability to drive and/or has transportation: Dependent  Ability to do shopping: Needs Assistance  Ability to manage finances: Independent  Is patient able to live independently?: Yes     Current Housing: Private Residence        Per the Fall Risk Screening, did the patient have 2 or more falls or 1 fall with injury in the past year?: Yes  How often do you think you are about to fall and you do NOT fall?  For example, you grab something to stabilize yourself or hold onto a wall/furniture?: Rarely  Use of a Mobility Aid: Yes  Difficulty walking/impaired gait: Yes  Issues with feet or shoes like numbness, edema, shoes not fitting: No  Changes in vision, poor vision or poor lighting in environment: No  Dizziness: No  Other Fall Risk: No     Frequent urination at night?: No  Do you use rails/bars?: Yes  Do you have a non-slip tub mat?: Yes     Are you experiencing loss of meaning?: No  Are you experiencing loss of hope and peace?: No     Thinking about your patient's physical health needs, are there any symptoms or problems (risk indicators) you are unsure about that require further investigation?: No identified areas of uncertainly or problems already being investigated   Are the patients physical health problems impacting on their mental well-being?: Mild impact on mental well-being e.g. \"\"feeling fed-up\"\", \"\"reduced enjoyment\"\"   Are there any problems with your patients lifestyle behaviors (alcohol, drugs, diet, exercise) that are impacting on physical or mental well-being?: Some mild concern of potential negative impact on well-being   Do you have any other concerns about your patients mental well-being?  How would you rate their severity and impact on the patient?: No identified areas of concern   How would you rate their home environment in terms of safety and stability (including domestic violence, insecure housing, neighbor harassment)?: Consistently safe, supportive, stable, no identified problems   How do daily activities impact on the patient's well-being? (include current or anticipated unemployment, work, caregiving, access to transportation or other): No identified problems or perceived positive benefits   How would you rate their social network (family, work, friends)?: Good participation with social networks   How would you rate their financial resources (including ability to afford all required medical care)?: Financially secure, resources adequate, no identified problems   How wells does the patient now understand their health and well-being (symptoms, signs or risk factors) and what they need to do to manage their health?: Reasonable to good understanding and already engages in managing health or is willing to undertake better management   How well do you think your patient can engage in healthcare discussions? (Barriers include language, deafness, aphasia, alcohol or drug problems, learning difficulties, concentration): Clear and open communication, no identified barriers   Do other services need to be involved to help this patient?: Other care/services not required at this time   Suggested Interventions and Community Resources                  Prior to Admission medications    Medication Sig Start Date End Date Taking? Authorizing Provider   HYDROcodone-acetaminophen (NORCO) 7.5-325 MG per tablet Take 1 tablet by mouth every 8 hours as needed for Pain for up to 7 days. Up to three tablets a day. 4/28/22 5/5/22 Yes Tabitha Treviño MD   lisinopril (PRINIVIL;ZESTRIL) 5 MG tablet Take 1 tablet by mouth daily 4/29/22  Yes Tabitha Treviño MD   dilTIAZem (CARDIZEM CD) 180 MG extended release capsule Take 1 capsule by mouth daily 4/29/22  Yes Tabitha Treviño MD   fluticasone (FLONASE) 50 MCG/ACT nasal spray 1 spray by Each Nostril route daily 4/29/22  Yes Tabitha Treviño MD   neomycin-bacitracin-polymyxin (NEOSPORIN) 400-5-5000 ointment Apply topically 2 times daily.  4/28/22 5/8/22 Yes Tabitha Treviño MD   bisacodyl (DULCOLAX) 10 MG suppository Place 1 suppository rectally every 48 hours  Patient taking differently: Place 10 mg rectally every 48 hours Uses PRN 4/29/22 5/29/22 Yes Tabitha Treviño MD   docusate sodium (COLACE) 100 MG capsule Take 2 capsules by mouth 2 times daily 4/28/22  Yes Tabitha Treviño MD   senna (SENOKOT) 8.6 MG tablet Take 2 tablets by mouth daily as needed (Constipation) 4/28/22 5/28/22 Yes Tabitha Treviño MD   baclofen (LIORESAL) 10 MG tablet Take 1 tablet by mouth 4 times daily 4/28/22  Yes Tabitha Treviño MD   levothyroxine (SYNTHROID) 100 MCG tablet Take 1 tablet by mouth Daily 4/29/22 Yes Betzy Rodriguez MD   pantoprazole (PROTONIX) 40 MG tablet Take 1 tablet by mouth daily 4/29/22  Yes Betzy Rodriguez MD   ferrous sulfate (IRON 325) 325 (65 Fe) MG tablet Take 1 tablet by mouth 2 times daily (with meals) 1/13/22  Yes Betzy Rodriguez MD   busPIRone (BUSPAR) 30 MG tablet Take 30 mg by mouth 2 times daily 12/17/21  Yes Bhumika Verdugo MD   Vitamin D, Ergocalciferol, 50 MCG (7674 UT) CAPS TAKE ONE CAPSULE BY MOUTH DAILY 3/29/21  Yes Juan Smith PA-C   methadone (DOLOPHINE) 10 MG tablet Take 10 mg by mouth 2 times daily.    Yes Historical Provider, MD       Future Appointments   Date Time Provider [place]   5/5/2022 11:30 AM CASSANDRA Garcia - CNP [facility]   5/10/2022 10:00 AM Lucy Rahman, OT [facility]   5/10/2022 11:00 AM Aime Monreal, PT [facility]   6/8/2022 11:00 AM Denae Gauthier Neuro [facility]   6/21/2022  1:30 PM MD Eder Francois med/reha [facility]   6/27/2022  3:20 PM Bhumika Verdugo MD [facility]

## 2022-05-03 NOTE — TELEPHONE ENCOUNTER
Fernanda 45 Transitions Initial Follow Up Call    Outreach made within 2 business days of discharge: Yes    Patient: Rochelle Bang Patient : 1960   MRN: [id number]  Reason for Admission: There are no discharge diagnoses documented for the most recent discharge. Discharge Date: 22       Spoke with: Bernard Jansen    Discharge department/facility: [facility] Interactive Patient Contact:  Was patient able to fill all prescriptions: Yes  Was patient instructed to bring all medications to the follow-up visit: Yes  Is patient taking all medications as directed in the discharge summary? Yes  Does patient understand their discharge instructions: Yes  Does patient have questions or concerns that need addressed prior to 7-14 day follow up office visit: no    Spoke with patient. She refused a sooner appointment with Dr LINDSEY/ [facility] [address].  She stated that she has other appointments and will follow up with us at next scheduled appointment on 22    Scheduled appointment with PCP within 7-14 days    Follow Up  Future Appointments   Date Time Provider [place]   2022 11:30 AM CASSANDRA Garcia - CNP [facility]   5/10/2022 10:00 AM Lucy Rahman OT [facility] [address] [address]   5/10/2022 11:00 AM Aime Monreal PT [facility] [address] [address]   2022 11:00 AM DO Geovanny Hines [facility] [address] [facility]   2022  1:30 PM Betzy Rodriguez MD [address]   2022  3:20 PM MD DANITA Toro  [facility] [place]

## 2022-05-05 ENCOUNTER — OFFICE VISIT (OUTPATIENT)
Dept: NEUROSURGERY | Age: 62
End: 2022-05-05

## 2022-05-05 VITALS
WEIGHT: 140 LBS | SYSTOLIC BLOOD PRESSURE: 126 MMHG | DIASTOLIC BLOOD PRESSURE: 81 MMHG | BODY MASS INDEX: 26.43 KG/M2 | HEIGHT: 61 IN | HEART RATE: 68 BPM | OXYGEN SATURATION: 100 %

## 2022-05-05 DIAGNOSIS — Z98.1 S/P CERVICAL SPINAL FUSION: ICD-10-CM

## 2022-05-05 DIAGNOSIS — G95.9 CERVICAL MYELOPATHY (HCC): ICD-10-CM

## 2022-05-05 DIAGNOSIS — M53.2X2 CERVICAL SPINE INSTABILITY: Primary | ICD-10-CM

## 2022-05-05 PROCEDURE — 99024 POSTOP FOLLOW-UP VISIT: CPT | Performed by: NURSE PRACTITIONER

## 2022-05-05 NOTE — PROGRESS NOTES
[address]  [facility] [address] [facility] [address]  Dept: [phone]    Patient:  Kasandra Sweet  YOB: 1960  Date: 5/5/22    The patient is a 62 y.o. female who presents today for consult of the following problems:     Chief Complaint   Patient presents with    Post-Op Check     2 week post op         HPI:     Kasandra Sweet is a 62 y.o. female who presents to the office for post-op evaluation s/p C5 corpectomy, posterior fixation C2-C7. Patient states that she has doing well postoperatively. Incisions healing well. Her staples removed at rehab facility. Denies any discharge, drainage, fevers, chills. No issues swallowing. Has been compliant with cervical collar. Does feel that she has had improving strength. Has been able to start moving toes on her left foot more easily. Has been able to ambulate with walker and assistance. Feels legs are stronger. No saddle anesthesia, issues with bowels or bladder. Still with some numbness and tingling to hands and feet though reports improving. Was just discharged from rehab, plans for continuing patient therapy, both physical and occupational, starting next week. Postop pain well controlled. Numbness and tingling to both hands and feet, reports improvement since surgery  Incisions CDI  Left upper extremity 4/5  Left lower extremity 4/5 proximally, 3+-4-/5 distally  Right upper extremity 4+/5  Right lower extremity 4+/5    Date of surgery: 4/12/2022    Assessment and Plan:      1. Cervical spine instability    2. Cervical myelopathy ([address]    3. S/P cervical spinal fusion          Plan: Continue cervical collar when out of bed. Continue working on physical and occupational therapy measures. Will refer for bone stimulator. Next postop visit in 6 weeks with Dr. Wing Woods, upright x-rays prior.     Followup: Return in about 5 weeks (around 6/9/2022), or if symptoms worsen or fail to improve. Prescriptions Ordered:  No orders of the defined types were placed in this encounter. Orders Placed:  Orders Placed This Encounter   Procedures    XR CERVICAL SPINE (2-3 VIEWS)     Standing Status:   Future     Standing Expiration Date:   5/5/2023     Scheduling Instructions:      Standing AP/lateral     Order Specific Question:   Reason for exam:     Answer:   post-op fusion        Electronically signed by CASSANDRA Cisneros CNP on 5/5/2022 at 2:44 PM    Please note that this chart was generated using voice recognition Dragon dictation software. Although every effort was made to ensure the accuracy of this automated transcription, some errors in transcription may have occurred.

## 2022-05-06 ENCOUNTER — TELEPHONE (OUTPATIENT)
Dept: NEUROSURGERY | Age: 62
End: 2022-05-06

## 2022-05-06 NOTE — TELEPHONE ENCOUNTER
----- Message from Roz Kocher, APRN - CNP sent at 5/5/2022  2:40 PM EDT -----  Regarding: Bone stimulator  Can we please send referral for bone stimulator, thank you!

## 2022-05-10 ENCOUNTER — HOSPITAL ENCOUNTER (OUTPATIENT)
Dept: PHYSICAL THERAPY | Age: 62
Setting detail: THERAPIES SERIES
Discharge: HOME | End: 2022-05-10
Payer: MEDICARE

## 2022-05-10 ENCOUNTER — HOSPITAL ENCOUNTER (OUTPATIENT)
Dept: OCCUPATIONAL THERAPY | Age: 62
Setting detail: THERAPIES SERIES
Discharge: HOME | End: 2022-05-10
Payer: MEDICARE

## 2022-05-10 PROCEDURE — 97530 THERAPEUTIC ACTIVITIES: CPT

## 2022-05-10 PROCEDURE — 97167 OT EVAL HIGH COMPLEX 60 MIN: CPT

## 2022-05-10 PROCEDURE — 97163 PT EVAL HIGH COMPLEX 45 MIN: CPT

## 2022-05-10 ASSESSMENT — 9 HOLE PEG TEST
TEST_RESULT: IMPAIRED
TESTTIME_SECONDS: 26
TESTTIME_SECONDS: 39
TEST_RESULT: IMPAIRED

## 2022-05-10 ASSESSMENT — 10 METER WALK TEST (10METWT)
TRIAL 1: TIME TO WALK 10 METERS: 20.69
TEST DIRECTIONS: SELF-SELECTED OR FAST-VELOCITY: SELF
SCORE (M/S): 0.48
AVERAGE: 20.7

## 2022-05-10 ASSESSMENT — PAIN DESCRIPTION - FREQUENCY: FREQUENCY: CONTINUOUS

## 2022-05-10 ASSESSMENT — PAIN DESCRIPTION - LOCATION: LOCATION: NECK;BACK;LEG

## 2022-05-10 ASSESSMENT — PAIN DESCRIPTION - ORIENTATION: ORIENTATION: LEFT;RIGHT;LOWER;POSTERIOR

## 2022-05-10 ASSESSMENT — PAIN DESCRIPTION - PAIN TYPE: TYPE: ACUTE PAIN

## 2022-05-10 ASSESSMENT — PAIN SCALES - GENERAL: PAINLEVEL_OUTOF10: 6

## 2022-05-10 NOTE — CONSULTS
[x] [facility]) - [facility] & Therapy  [address]  [place]: [phone]   F: [phone]        Physical Therapy Neurological Evaluation    Date:  5/10/2022  Patient: Lynn Miller  : 1960  MRN: [id number]  Physician: Kennedi Smith MD   Insurance: Medicare - based on [place]   Medical Diagnosis:   M20.837X (ICD-10-CM) - Spinal cord injury, cervical region, sequela ([facility]   G82.54 (ICD-10-CM) - Incomplete quadriplegia at C5-6 level ([facility]   G83.4 (ICD-10-CM) - Cauda equina syndrome ([facility]   Rehab Codes: R25 pain , R53.1 weakness, Z74.0 reduced mobility, R29.6 high fall risk, R27.8 lack of coordination, R26.89 gait abnormality   Date of symptom onset: 21 -- cauda equina; 22 Cervical surgeries   Next 's appt. : 22 with Neurosurgery; 22 with PMR Dr. Cabrera    PN needed by visit 10     Precautions; c-collar must be worn due to s/p C2-C7 fixation; not lifting greater than a gallon of milk         Subjective:   Pt arrives to clinic in manual WC wearing cervical collar. Pt is accompanied by her grandson, Christie Sahni . Pt is agreeable to PT evaluation. Pt was previously seen in this clinic 22-3/14/22 for PT but was discharged to go back to her MD due to progressing symptoms impacting mobility. Saw neurosurgeon and had MRI showing worsening of stenosis and myelopathy which was contributing to L sided weakness. She went to the hospital 22 for planned surgery for cervical deformities. She underwent C5 corpectomy with C4-C6 arthrodesis, open reduction of cervical kyphotic deformity, and C2-C7 posterior fusion on 22 (Dr. Brandon Leonard). Khadra Kingston has a cervical collar when out of bed. Following surgery she completed IPR and was discharged home. Using a 2WW for mobility in the home and [facility] for longer distances. Since cervical surgery she has noticed decreased dexterity in L hand and LUE weakness -- does feel it is improving.  Her LLE is more spastic now and she is using an AFO she got during her admission. Pt feels that she is getting somewhat better but has goals to be able to ambulate without her 2WW. Pt denies any falls since being home. Pt reports stable symptoms related to her cauda equina. HPI: per chart review: \" Corrie Cano is a 62 y.o. female with history of cauda equina syndrome s/p L2-S1 laminectomy (12/2021), HTN, hypothyroidism, GERD, breast cancer, and anxiety admitted to [facility] on 4/11/2022. She presented for planned surgical intervention for cervical stenosis. She underwent C5 corpectomy with C4-C6 arthrodesis, open reduction of cervical kyphotic deformity, and C2-C7 posterior fusion on 4/12/22 (Dr. Irwin Guido). She has a cervical collar when out of bed. After her lumbar surgery in Dec 2021, patient had been improving initially with therapy, however overall has regressed. . Pt admitted to rehab unit on 4/15/22. \"     Patient stated Goals: to walk without a walker, improve balance     Pain:  [x] Yes  [] No Location: neck and down spine   Pain Rating: (0-10 scale) 6/10  Pain descriptors: constant, dull ache   -- goes to pain management      Pain altered Tx:  [] Yes  [x] No  Action:    Previous PT hx:   - was in outpatient PT prior to being admitted   - completed IPR 4/15 -4/29 at [facility]    -- bed mobility Miryam    -- transfers: SBA    -- ambulation: CGA with 2WW    -- 2mWT: 82 ft -- did not tolerate a full 6mWT       PMHx: [] Unremarkable [] Diabetes [x] HTN  [] Pacemaker   [] MI/Heart Problems [x] Cancer (breast) [] Arthritis [x] Other: hx of cauda equina (2021)              [x] Refer to full medical chart  In EPIC     Comorbidities:   [] Obesity [] Dialysis  [x] Other: lumbar surgery 2021   [] Asthma/COPD [] Dementia [] Other:   [] Stroke [] Sleep apnea [] Other:   [] Vascular disease [] Rheumatic disease [] Other:     Tests/Imaging: Cv spine Xray 4/12/22  FINDINGS:   Status post C5 corpectomy with anterior fusion from C4-C6.  Transpedicular   mic and screw fixation from C2-C7 is noted.  Surgical drains are present and   soft tissue gas consistent with recent surgery is noted.  No acute findings   identified in the lung apices.        Medications: [x] Refer to full medical record [] None [] Other:  Allergies:       [x] Refer to full medical record [] None [] Other:       Function:  Hand Dominance  [x] Right  [] Left    Home Environment:   Patient lives with:  Rebecca Dias & humberto -- 24/7 assist    In what type of home []  One story   [x] Two story -- bed & bath on main level   [] Split level   Number of stairs to enter  1 step to enter -- using walker        Handrail:    []  Right to enter   [] Left to enter    Stairs within the home   12      Handrails: []  Right to ascending  [] Left to ascending   Bathroom has a []  Tub only  [x] Tub/shower combo   [] Walk in shower    [x]  Grab bars  -- has a shower chair that she uses; has a tub transfer bench but has not set it up          Washing machine is on []  Main level   [] Second level   [] Basement   Employer/Job  on disability    Hobbies  Gardening and being outdoors        Durable Medical Equipment:  Current DME available: rollator, 2WW, manual WC, lift chair at home     ADL/IADL Previous level of function Current level of function Who currently assists the patient with task/Comments   Bathing  [] Independent  [x] Assist [] Independent  [x] Assist SO assists with getting in/out of shower and washing the back as she uses the shower chair    Dress/grooming [x] Independent  [] Assist [x] Independent  [] Assist    Transfer/ bed mobility [x] Independent  [] Assist [x] Independent  [] Assist Sleeps in a recliner    Feeding [x] Independent  [] Assist [] Independent  [x] Assist Difficulty cutting food due to hand dexterity    Toileting [x] Independent  [] Assist [x] Independent  [] Assist    Driving [] Independent  [x] Assist [] Independent  [x] Assist    Housekeeping [] Independent  [x] Assist [] Independent  [x] Assist Helping with meal prep and washing dishes    Grocery shop/meal prep [] Independent  [x] Assist [] Independent  [x] Assist driving the electric carts    Ambulation [] Independent  [x] Assist -2WW [] Independent  [x] Assist  More limited by strength and endurance; only going short distances        Objective:    POSTURE No deficit Deficit Not Tested Comments   Kyphosis [] [x] []    Scoliosis [] [x] [] Lumbar but this is known from prior surgeries   Forward Head [] [] [] Wearing cervical collar    Rounded Shldrs [] [x] []    Slumped Sitting [] [x] []    Skin Integrity [] [] [x]    NEUROLOGICAL       Reflexes [] [] [x]    Sensation [] [x] [] Tingling in bilateral hands & feet; hyposensitive from the knees down; poor sensation in feet    Bladder/Bowel [x] [] []    Coordination [] [x] [] Impaired motor control LLE    Tone [] [x] [] Increased tone LLE -- see bwloe for comments    Clonus [] [] [x] Pt wearing AFO    Vision  x      Cognition x      Tremors [x] [] []       TONE ASSESSMENT:   - L quad: 1+   - L hamstrin+   - did not formally assess tone in L ankle but observed tone with supination and inversion of L foot with standing and walking despite being in AFO           Tested in sitting   Strength  Left Strength  Right   Hip Flexion 3- 4   Hip Abduction 3 4   Hip Adduction 3 5   Hip Extension     Hip ER       Hip IR       Knee Flexion 2 5   Knee Extension 4- 4   Dorsiflexion -- held as has AFO on 4   Plantar flexion -- held as has AFO on 5   Inversion - held as has AFO on  5   Eversion - held as has AFO on    4      FUNCTION Level of assistance Comments/observations   Bed Mobility Did not assess this date    -rolling     -sit to supine     -supine to sit     Transfers     -sit to stand SBA  Need for UE  Support, unable to complete without    -pivot SBA  With UE support on 2WW, decreased coordinated steps to pivot due to LLE impaired motor control    Balance     -sitting static IND    - sitting dynamic IND -standing static SBA  Sway when in various stance positions or eyes closed    - standing dynamic CGA     Ambulation CGA  With 2WW & L AFO -- decreased LLE motor control leading to decreased step lengths and impaired swing mechanics. Notable decreased L knee control in stance -- tone seems to be leading to L knee hyperextension    W/C Mobility Needs assist    Stairs  Will be assessed at later sessions       Functional outcome measures:     Functional Patient Reported Outcome Assessment Used:  OPTIMAL (see soft Chart)  Current Status Score: 56/90   Goal Status Score: 46/90        05/10/22 1100   Kevin Balance Scale   1. Sitting to Standing 3   2. Standing Unsupported 3   3. Sitting with Back Unsupported but Feet Supported on Floor or on a Stool 4   4. Standing to Sitting 3   5. Transfers 3   6. Standing Unsupported with Eyes Closed 0  (LOB forward after about 5 seconds)   7. Standing Unsupported with Feet Together 1  (difficulty getting feet fully together due to spasticity )   8. Reach Forward with Outstretched Arm While Standing 1   9.  Object from Floor from a Standing Position 0  (needs UE support for stability )   10. Turning to Look Behind Over Left and Right Shoulders While Standing 0   11. Turn 360 Degrees 0   12. Place Alternate Foot on Step or Stool While Standing Unsupported 0   13. Standing Unsupported One Foot in [address] [place] [address] 1   14.  Standing on One Leg 0   Kevin Balance Score 19   Kevin Balance Disability Index 60-79%   Kevin CMS Modifier CL       Outcome Measures:     Balance/Gait Assessment(s) Performed:   Score only for all balance and gait test:  Kevin Balance Score: 19/56 -- increased fall risk   5 TIMES SIT TO STAND: 19.5 seconds (from WC level with use of UE support)  Timed Up and Go: 35.5 (with 2WW, CGA)  10 Meter Walk Test   Self-Selected or Fast-Velocity: self  Trial 1 (Time to Walk 10 Meters): 20.69  Average: 20.7 seconds    Score (Meters/Seconds): 0.48 Meters/Second -- limited community ambulator     Outcome Measures 5/10/22    ESCOBEDO 19/56    5xSTS 19. Skye Ou 5     10mWT 0.48m/s    TUG  35.5 s with 2WW, CGA     FGA     MiniBEST              Assessment:    Pt presents with decreased strength, mobility, and motor control that impacts balance and mobility due to spinal deficits/surgeries causing incomplete quadriplegia -- L hemibody more affected than R. Feel her LLE is more impaired in terms of strength, tone, and motor control as compared to her previous measures in early 2022 POC. Her deficits are leading her to only be able to navigate her home with 2WW and need for WC in community. She requires CGA to SBA for all mobility to ensure safety. Pt is at a high fall risk given her scores on outcome measures of ESCOBEDO, 5xSTS, TUG, and gait speed. Her gait speed with 2WW is that of household ambulation speed which is consistent with her reports, but she wants to improve upon this. Pt has goals to improve her ambulation independence and capacity to reach community ambulation and be progressing as she was in early 2022. She also wants to improve on all aspects of balance to decrease fall risk. Feel that with good compliance with PT she can improve upon these deficits for safer and efficient mobility. Problems:    [x] ? Pain:  [x] ? ROM:  [x] ? Strength:  [x] ? Function:  [x] Other: increased fall risk, gait abnormalities      STG: (to be met in 10 treatments)  1. Pt will improve score on ESCOBEDO balance scale by >/=6 points to improve overall balance stability and decrease fall risk with mobility. 2. Pt will demonstrate >10 SLS stability with light UE support to safely allow pt to enter/exit her tub shower. 3. Pt will improve time on TUG to <20s with LRAD at mod IND level to demonstrate decreased fall risk. 4. Pt will increase gait speed to >0.8 m/s  With LRAD at mod I level to demonstrate decreased fall risk and optimize mobility to at community level.    5. Pt will be able to manage her walker up 1 step without instability to improve independence with access to her home. 6.  Pt will improve time on 5xSTS to <15 seconds with use of 1 UE to decrease fall risk and increase functional capacity for mobility. LTG: (to be met in 20 treatments)  1. Pt will improve score on ESCOBEDO balance scale by >/= 45/56 points  to improve overall balance stability and decrease fall risk with mobility. 2.  Pt will increase strength of all major muscle groups of the LEs to 4/5 or greater demonstrating improved strength needed to maximize safety and efficiency with mobility tasks such as gait, transfers and stairs. 3. Pt will be independent with home program addressing strength, flexibility and balance to maximize gains made in therapy to improve overall functional capacity for mobility. 4. Pt will improve score on OPTIMAL to 46/90 to demonstrate functional improvements with ADLs. 5. Pt will report no falls for x6 weeks demonstrating improved safety with mobility and implementation of fall prevention strategies. * further goals may be established based on additional assessments or progress*         Rehab Potential:  [x] Good  [] Fair  [] Poor   Suggested Professional Referral:  [x] No  [] Yes:  Barriers to Goal Achievement[de-identified]  [] No  [x] Yes: cauda equina deficit for > 1 year   Domestic Concerns:  [x] No  [] Yes:    Pt. Education:  [x] Plans/Goals, Risks/Benefits discussed  [] Home exercise program  Method of Education: [x] Verbal  [x] Demo  [] Written  Comprehension of Education:  [] Verbalizes understanding. [x] Demonstrates understanding. [] Needs Review. [x] Demonstrates/verbalizes understanding of HEP/Ed previously given.     Treatment Plan:  [x] Therapeutic Exercise   22583  [] Iontophoresis: 4 mg/mL Dexamethasone Sodium Phosphate  mAmin  53024   [x] Therapeutic Activity  92024 [] Vasopneumatic cold with compression  76469    [x] Gait Training   13254 [] Ultrasound   23929   [x] Neuromuscular Re-education  48501 [] Electrical Stimulation Unattended  40180   [x] Manual Therapy  76658 [] Electrical Stimulation Attended  [id number]   [x] Instruction in HEP  [] Dry Needling   [x] Aquatic Therapy   [id number] [x] Cold/hotpack    [] Massage   [id number]      [] Lumbar/Cervical Traction  [id number]       Frequency: 2-3 x/weeks for 20 visits    Todays Treatment:  - reviewed fall education handout.   - educated patient on fall risk   - verbally reviewed home exercises given at [facility].  Encouraged pt to continue with them at home.   - discussed issues with AFO -- pt has now gotten different shoes and feels she needs adjustment to AFO     Specific Instructions for next treatment: begin with LE strengthening and balance work      Evaluation Complexity:  History (Personal factors, comorbidities) [] 0 [] 1-2 [x] 3+   Exam (limitations, restrictions) [] 1-2 [] 3 [x] 4+   Clinical presentation (progression) [] Stable [] Evolving  [x] Unstable   Decision Making [] Low [] Moderate [x] High    [] Low Complexity [] Moderate Complexity [x] High Complexity       Treatment Charges: Mins Units   [x] Evaluation       []  Low       []  Moderate       [x]  High 43 1   []  Modalities     []  Ther Exercise     []  Manual Therapy     [x]  Ther Activities 10 1   []  Aquatics     []  Vasocompression     []  Other     * pay attention to KX modifier*     TOTAL TREATMENT TIME: 53     (Billed timed treatment: 10 min)     Time In: 1100  Time Out: 8303    Electronically signed by:   Loy Mcpherson PT, DPT   #[id number]

## 2022-05-10 NOTE — PROGRESS NOTES
Occupational Therapy: Initial Evaluation   Patient: Moss Crigler (38 y.o. female)   Examination Date:   Plan of Care Certification Period: 5/10/2022 to    Progress Note Counter: Eval  & tx 2-3 x/week for 6 wk   :  1960  MRN: [id number]  CSN: [id number]   Insurance: Payor: MEDICARE / Plan: MEDICARE PART A AND B / Product Type: *No Product type* /   Insurance ID: [id number] - (Medicare) Secondary Insurance (if applicable):     Insurance Information: Medicare   Referring Physician: MD Dr Gaetano Dubon   PCP: Mary Pacheco MD Visits to Date/Visits Approved:       No Show/Cancelled Appts:   /       Medical Diagnosis: Unspecified injury at unspecified level of cervical spinal cord, sequela [S14.109S]  Quadriplegia, C5-C7 incomplete [G82.54]  Cauda equina syndrome [G83.4] spinal cord injury , cervical region sequela (S14.109S), incomplete quadriplegia at C5-6 level (G82.54), cauda equina syndrome ( G83.4) - ICD-10 codes  Treatment Diagnosis: bilateral UE weakness & impaired coordination, bilateral shoulder stiffness, impaired sensation - hands lt more than rt  (M62.81, R27.9, M25.611, M25.612,R20.2         [facility]   Patient assessed for rehabilitation services?: Yes  Self reported health status[de-identified] Good    Medical History:     Past Medical History:   Diagnosis Date    Anxiety     Arthritis     At maximum risk for fall 2021    caudal equina syndrome and cervical stenosis    Cancer ([address]     right breast    Cauda equina syndrome ([address] 2021    neuropathy, mobility difficulty, incontinance    Cervical stenosis of spine 2021    GERD (gastroesophageal reflux disease)     History of stomach ulcers     Hypertension     Dr. Augusto Tran    Hypothyroidism     Lumbar neuritis     Post laminectomy syndrome     Spinal deformity 2021    cervical and lumbar    Thyroid disease     Uses wheelchair     Wears dentures     Wellness examination      Michael Guzmán     Surgical History:   Past Surgical History:   Procedure Laterality Date    BACK SURGERY      lower back x 3, cervical- x 1: C4- C6, 11/4/2014     BREAST SURGERY Right     mastectomy with reconstruction    CERVICAL FUSION N/A 4/12/2022    C5 CORPECTOMY, USE OF INTRAOPERATIVE CERVICAL TRACTION performed by Niurka Cueva DO at [address] [place] [address] N/A 4/12/2022    POSTERIOR FIXATION C2-C7 performed by Niurka Cueva DO at [address] COLONOSCOPY  about 2018    HERNIA REPAIR Bilateral     inguinal    HYSTERECTOMY, TOTAL ABDOMINAL      LUMBAR SPINE SURGERY N/A 12/30/2021    LUMBAR LAMINECTOMY L2-S1 performed by Niurka Cueva DO at [facility], RADICAL      OTHER SURGICAL HISTORY  04/12/2022    C5 CORPECTOMY, USE OF INTRAOPERATIVE CERVICAL TRACTION (N/A Spine Cervical)        Medications:   Current Outpatient Medications:     lisinopril (PRINIVIL;ZESTRIL) 5 MG tablet, Take 1 tablet by mouth daily, Disp: 30 tablet, Rfl: 3    dilTIAZem (CARDIZEM CD) 180 MG extended release capsule, Take 1 capsule by mouth daily, Disp: 30 capsule, Rfl: 3    fluticasone (FLONASE) 50 MCG/ACT nasal spray, 1 spray by Each Nostril route daily, Disp: 16 g, Rfl: 3    bisacodyl (DULCOLAX) 10 MG suppository, Place 1 suppository rectally every 48 hours (Patient taking differently: Place 10 mg rectally every 48 hours Uses PRN), Disp: 15 suppository, Rfl: 0    docusate sodium (COLACE) 100 MG capsule, Take 2 capsules by mouth 2 times daily, Disp: 60 capsule, Rfl: 1    senna (SENOKOT) 8.6 MG tablet, Take 2 tablets by mouth daily as needed (Constipation), Disp: 60 tablet, Rfl: 1    baclofen (LIORESAL) 10 MG tablet, Take 1 tablet by mouth 4 times daily, Disp: 120 tablet, Rfl: 1    levothyroxine (SYNTHROID) 100 MCG tablet, Take 1 tablet by mouth Daily, Disp: 30 tablet, Rfl: 3    pantoprazole (PROTONIX) 40 MG tablet, Take 1 tablet by mouth daily, Disp: 30 tablet, Rfl: 3    ferrous sulfate (IRON 325) 325 (65 Fe) MG tablet, Take 1 tablet by mouth 2 times daily (with meals), Disp: 30 tablet, Rfl: 3    busPIRone (BUSPAR) 30 MG tablet, Take 30 mg by mouth 2 times daily, Disp: 60 tablet, Rfl: 3    Vitamin D, Ergocalciferol, 50 MCG (2000 UT) CAPS, TAKE ONE CAPSULE BY MOUTH DAILY, Disp: 90 capsule, Rfl: 3    methadone (DOLOPHINE) 10 MG tablet, Take 10 mg by mouth 2 times daily. , Disp: , Rfl:   Allergies: Latex and Kiwi extract      SUBJECTIVE EXAMINATION     History obtained from[de-identified] Patient,      Family/Caregiver Present: Yes (Grandson)    Subjective History: Onset Date:  (4- C5 corpectomy,posterior fixation C2-7.)  Subjective: Pt lives with chronic pain. Pt states she never goes below 5. Pt states she goes to pain clinic. Pt states the feeling is coming back in her hands. Additional Pertinent Hx (if applicable): 4/83/5672 C5 CORPECTOMY, USE OF INTRAOPERATIVE CERVICAL TRACTION ,4/12/2022 POSTERIOR FIXATION C2-C7 ,12/30/2021 LUMBAR LAMINECTOMY L2-S1     Prior diagnostic testing[de-identified] MRI,X-ray,CT Scan  Comment: Pt seen for OT eval. Pt using manuall w/c. Pt wears ASPEN collar. Pt states she wears AFO because she has lt foot drop. Pt states she has been hospitalized twice since Jan 2022. Pt states she has to wear ASPEN collar in shower. Learning/Language: Learning  What is the preferred language of the patient/guardian?: English     Pain Screening    Pain Screening  Patient Currently in Pain: Yes  Pain Assessment: 0-10  Pain Level: 6  Best Pain Level: 5  Worst Pain Level: 9  Post Treatment Pain Level: 6  Patient's Stated Pain Goal: 5  Pain Type: Acute pain  Pain Location: Neck,Back,Leg  Pain Orientation: Left,Right,Lower,Posterior (posterior neck, low back)  Pain Radiating Towards: Pt states her pain radiates to the feet. Pain Descriptors:  (constant.)  Pain Frequency: Continuous  Aggravating factors:  Movement    Functional Status    Dominant Hand: : Right    Social History:    Social History  Lives With: Significant other (Grandson)  Type of Home: House  Home Layout: Two level,Able to Live on Main level with bedroom/bathroom,Performs ADL's on one level (Pt's grandson lives upstairs)  Home Access: Stairs to enter without rails  Entrance Stairs - Number of Steps: 1 step on slap  Bathroom Shower/Tub: Tub/Shower unit  Bathroom Toilet: Standard (with extension on toilet seat)  Bathroom Equipment: Grab bars in shower  Bathroom Accessibility: Accessible  Home Equipment: Walker, rolling,Wheelchair-manual (Pt's sleeps in recliner,Aspen collar , AFO)    Occupation/Interests:   Occupation: On disability    Prior Level of Function:     Independent        Current Level of Function:   Prior to her surgeries pt was independent    IADL Comments: See UEFI  Receives Help From: Family  ADL Assistance: Needs assistance (Currently pt's significant other helps her with shower)  Ambulation Assistance: Independent (with RW per pt)  Transfer Assistance: Independent  Active :  (Pt's family)    ADLs:   ADL  Feeding: Independent  Grooming: Modified independent   UE Bathing: Modified independent  (Pt states she just gets assist with her back.  Pt states she wears ASPEN collar in shower.)  LE Bathing: Modified independent   UE Dressing: Modified independent   LE Dressing: Modified independent     OBJECTIVE EXAMINATION   Restrictions: Pt has ASPEN collar on      Left AROM  Right AROM      LUE AROM (degrees)  LUE General AROM: Elbow, forearm,wrist - wfls  L Shoulder Flexion 0-180: 125  L Shoulder ABduction 0-180: 140  L Shoulder Int Rotation  0-70: 75  L Shoulder Ext Rotation  0-90: 60  Left Hand AROM (degrees)  Left Hand AROM: WFL  Left Hand General AROM: flex,extension, abduction/adduction - wfls  L Thumb Opposition: wfls RUE AROM (degrees)  RUE General AROM: Elbow, forearm, wrist - wfls  R Shoulder Flexion 0-180: 160  R Shoulder ABduction 0-180: 158  R Shoulder Int Rotation  0-70: 90  R Shoulder Ext Rotation 0-90: 55  Right Hand AROM (degrees)  Right Hand AROM: WFL  Right Hand General AROM: flex,extension, abduction/adduction - wfls  R Thumb Opposition: wfls       Left PROM  Right PROM      LUE PROM (degrees)  LUE General PROM: elbow, wrist, hand - wfls  L Shoulder Flex  0-180: 140  L Shoulder ABduction 0-180: 160  L Shoulder Int Rotation  0-70: 80  L Shoulder Ext Rotation  0-90: wfls RUE PROM (degrees)  RUE General PROM: elbow, forearm, wrist., hand - wfls  R Shoulder Flex  0-180: 160  R Shoulder ABduction 0-180: wfls  R Shoulder Int Rotation  0-70: 70  R Shoulder Ext Rotation  0-90: wfls       Left Strength  Right Strength      LUE Strength  L Shoulder Flex: 3+/5  L Shoulder ABduction: 3+/5  L Shoulder Int Rotation: 4/5  L Shoulder Ext Rotation: 3+/5  L Elbow Flex: 5/5  L Elbow Ext: 5/5  L Forearm Pron: 4/5  L Forearm Sup: 4-/5  L Wrist Flex: 3+/5  L Wrist Ext: 3+/5  L Hand General:  (extention 3+/5, flexion 4/5)    RUE Strength  R Shoulder Flex: 3+/5  R Shoulder ABduction: 3+/5  R Shoulder Int Rotation: 3+/5  R Shoulder Ext Rotation: 3+/5  R Elbow Flex: 5/5  R Elbow Ext: 5/5  R Forearm Pron: 5/5  R Forearm Sup: 5/5  R Wrist Flex: 5/5  R Wrist Ext: 4-/5  R Hand General:  (4/5 flexion, 4-/5 extension)          Hand Dominance: Right   Left  Right     Strength  Handle Setting 2: Average 31.67 # between the 10th -25th percentile for norms  Trial 1: 30  Trial 2: 30  Trial 3: 35  Average: 31.67 Handle Setting 2: Average 48. 33# between 25th -50th percentile for norms  Trial 1: 45  Trial 2: 50  Trial 3: 50  Average: 48.33   Pinch Strength (if applicable) Left Hand Strength - Lateral Pinch (lbs)  Trial 1: 10  Trial 2: 11  Trial 3: 10  Average: 10.33  Left Hand Strength - Farrell (lbs)  Trial 1: 10  Trial 2: 11  Trial 3: 11  Average: 10.67  Left Hand Strength - Tip (lbs)  Trial 1: 7  Trial 2: 5  Trial 3: 5  Average: 5.67 Right Hand Strength - Lateral Pinch (lbs)  Trial 1: 10  Trial 2: 12  Trial 3: 12  Average: 11.33  Right Hand Strength - Farrell (lbs)  Trial 1: 11  Trial 2: 12  Trial 3: 13  Average: 12  Right Hand Strength - Tip  (lbs)  Trial 1: 9  Trial 2: 10  Trial 3: 8  Average: 9       Left Hand Edema  Right Hand Edema             Fine Motor Skills:   Fine Motor Skills  Left 9-Hole Peg Test: Impaired  Left 9 Hole Peg Test Time (secs): 39  Right 9-Hole Peg Test: Impaired  Right 9 Hole Peg Test Time (secs): 26       Outcome Measure(s) Completed:   Upper Extremity Functional Index   Today, do you or would you have any difficulty at all with: Any of your usual work, housework, or school activities[de-identified] Moderate difficulty (housework)  Your usual hobbies, re creational or sporting activities[de-identified] Extreme difficulty or unable to perform activity (unable with gardening)  Lifting a bag of groceries to waist level[de-identified] Moderate difficulty  Lifting a bag of groceries above your head[de-identified] Extreme difficulty or unable to perform activity (extreme difficulty)  Grooming your hair[de-identified] Moderate difficulty  Pushing up on your hands (eg from bathtub or chair):: No difficulty  Preparing food (eg peeling, cutting):: Moderate difficulty  Driving[de-identified] Extreme difficulty or unable to perform activity (hasn't drove 8 months- unalbe)  Vacuuming, sweeping or raking[de-identified] Extreme difficulty or unable to perform activity (unable with sweep/vacuum. Aries does)  Dressing[de-identified] Moderate difficulty  Doing up buttons[de-identified] Quite a bit of difficulty (Pt states her fingers aren't working properly  - feel wriinkles)  Using tools or appliances[de-identified] Moderate difficulty  Opening doors[de-identified] No difficulty  Cleaning[de-identified] No difficulty (swifter duster)  Tying or lacing shoes[de-identified] A little bit of difficulty (Takes pt longer to tie shoes. She uses elastic shoe laces)  Sleeping[de-identified] No difficulty  Laundering clothes (eg washing, ironing, folding):: Extreme difficulty or unable to perform activity (unable.  Pt's laundry area steps down & she can't get walker in there.)  Opening a jar[de-identified] No difficulty (if jar has been opened)  Throwing a ball[de-identified] No difficulty  Carrying a small suitcase with your affected limb[de-identified] No difficulty (Pt would have to use walker. As  long as item isn't heavy)  UEFI Total Score: 44  UEFI Total Percentage: 55 %     Total Score (out of 80) - if applicable: 44   Current Percentage of Function: 55 % % (Date: 5/10/2022)    Interpretation of Score: The final UEFI score ranges between 0 and 80 points. Scores closer to 0 indicate severe limitation whilst scores closer to 80 indicate very little to no limitation. The significant change ([address]) between two subsequent evaluations is set at 9 points. Higher Score indicates less disability, more function. ASSESSMENT     Impression: Assessment: Pt presents with cervical, back, & bilateral leg pain. Pt stands up from w/c modified independent during OT. Pt has bilateral tightness shoulder muscles with rom limitations. Bilateral UE weakness with lt side weaker than rt , impaired hand dexterity. & hand sensation. Pt will benefit from skilled OT  to provide therapeutic exercises/activities to improve UE motion, strength, coordination, sensory awareness to enable to pt complete IADLS with less difficulty. Performance Deficits/Impairments: Decreased ROM,Decreased strength,Decreased coordination,Decreased fine motor control,Decreased high-level IADLs,Decreased sensation    Statement of Medical Necessity: Occupational Therapy is both indicated and medically necessary as outlined in the POC to increase the likelihood of meeting the functionally related goals stated below. Patient's Activity Tolerance:        Patient's rehabilitation potential/prognosis is considered to be: Good    Factors which may impact rehabilitation potential include: Pain tolerance/management        GOALS     Patient Goal(s): Patient goals : To use her arms /hands as best she can to function.  Pt also wants to improve her walking & will be getting PT for that goal.  Short Term Goals Completed by 10 visits Goal Status   Pt will demo & verbalize independence with bilateral UE HEP     Pt will report improving independence & less difficulty with fine motor activities. Pt will complete 9 hole peg test 4 seconds quicker with rt hand, & 5 seconds quicker with lt hand. Increase bilateral hand strength for daily activities: increase rt ( by 5# & tip pinch by 2#), increase lt ( by 5#, lateral & tip pinch by 2#)     Increase bilateral shoulder functional AROM by 10 : lt shoulder (fleixon, abduction, IR,ER), & rt shoulder (flexion, abduction, ER ) to improve reaching for selfcare & reach into cabinets. Long Term Goals Completed by 18 visits Goal Status   Using the UEFI pt will report a score of 3 (little difficulty) with doing light housework, hair grooming, lifting groceries to waist ht, cutpeeling food, getting dressing, button manipulation. Pt will verbalize increase sensory awareness with lt hand: increase sterognosis accuracy to 50% (identify 4 out of 8 objects correctly)     compared to eval increase PROM: rt shoulder flexion & IR by 10, lt shoulder flexion, abduction, IR by 10     Increase bilateral UE strength to 4/5 : rt  UE shoulder (flexion,abduction, IR,ER),wrist & hand extension, lt UE shoulder (flexion,abduction,ER),supination,wrist (flexion,extension),& hand extension to improve strength for selfcare & light home task. .     Compared to eval increase lt hand strength:  by 10# & tip pinch by 4, & improve lt hand fine motor dexterity by 10 seconds         compared to eval increase AROM lt shoulder (flexion & abduction) by 15-20  TREATMENT PLAN       REQUIRES OT FOLLOW-UP: Yes  Type: Outpatient  Plan Comment: continue OT    Pt. and/or family actively involved in establishing Plan of Care and Goals: Yes   Patient/ Caregiver education and instruction: OT Hari Nagy of [facility] Patient Education: OT reviewed POC with pt. Pt familiar with OT from being on rehab unit at [address] [place] [address].   HEP:       Treatment may include any combination of the following: Strengthening,ROM,Patient/Caregiver education & training,Coordination training,Positioning     Frequency / Duration:  Patient to be seen 2-3x/week for 18 visits weeks      Eval Complexity:   Decision Making: High Complexity  Occupational Profile/History : Medium  Assessment(s) that identifies: High  Exam: 6 performance deficits  Assistance Modification: Medium  Assistance / Modification: See UEFI. Pt uses w/c, RW, lt AFO, ASPEN collar. Therapy Time  Individual Time In: 1006  Individual Time Out: 1100  Minutes: 47        Therapist Signature: Shakira Moore OT    Date: 2/97/4606     I certify that the above Occupational Therapy Services are being furnished while the patient is under my care. I agree with the treatment plan and certify that this therapy is necessary. Physician's Signature:  ___________________________   Date:_______                                                                   Isaac Drake MD        Physician Comments: _______________________________________________    Please sign and return to [address] [place] [address]. Please fax to the location listed below.  [facility] YOU for this referral!    [address] [facility]  [address] [facility], [address] [facility]  Dept: [phone]  Dept Fax: [phone]  Loc: [phone]       POC NOTE      Treatment Charges: Mins Units Time In/Out   [] Evaluation       []  Low       []  Moderate       [x]  High 54 1    []  Electrical Stim      []  Iontophoresis      []  Paraffin      []  Ultrasound        []  Masage      []  Neuromuscular Re-ed      []  ADL      []  Ther Exercise      []  Ther Activities      []  Neuro Re-Ed      []  Splinting      []  Other      Total  time 54 min

## 2022-05-11 ENCOUNTER — CARE COORDINATION (OUTPATIENT)
Dept: CARE COORDINATION | Age: 62
End: 2022-05-11

## 2022-05-11 NOTE — CARE COORDINATION
Ambulatory Care Coordination Note  5/11/2022  CM Risk Score: 5  Charlson 10 Year Mortality Risk Score: 47%     ACC: Juanita Johnson RN    Summary Note: Had PT and OT makenzie this week, saw neurosurgeon NP. Wearing cervical collar, no problems with incisions. She is supposed to get called to scheduled therapy visits, hasn't been called yet, she will call if she doesn't hear from them. No concerns or any new symptoms this week. Bladder/bowels ok, eating well. Using walker, no falls, able to walk short distances. CC Plan:   -Follow up next week to check on therapy appts, ambulation. General Assessment    Do you have any symptoms that are causing concern?: No              Goals Addressed                 This Visit's Progress     Conditions and Symptoms   On track     I will schedule office visits, as directed by my provider. I will keep my appointment or reschedule if I have to cancel. I will notify my provider of any barriers to my plan of care. I will notify my provider of any symptoms that indicate a worsening of my condition. Barriers: lack of education  Plan for overcoming my barriers: ACM calls, education  Confidence: 9/10  Anticipated Goal Completion Date: 7/30/22              Prior to Admission medications    Medication Sig Start Date End Date Taking?  Authorizing Provider   lisinopril (PRINIVIL;ZESTRIL) 5 MG tablet Take 1 tablet by mouth daily 4/29/22   Lindsay De La Torre MD   dilTIAZem (CARDIZEM CD) 180 MG extended release capsule Take 1 capsule by mouth daily 4/29/22   Lindsay De La Torre MD   fluticasone Phyllistine Gallery) 50 MCG/ACT nasal spray 1 spray by Each Nostril route daily 4/29/22   Lindsay De La Torre MD   bisacodyl (DULCOLAX) 10 MG suppository Place 1 suppository rectally every 48 hours  Patient taking differently: Place 10 mg rectally every 48 hours Uses PRN 4/29/22 5/29/22  Lindsay De La Torre MD   docusate sodium (COLACE) 100 MG capsule Take 2 capsules by mouth 2 times daily 4/28/22   Lindsay De La Torre MD villalba (SENOKOT) 8.6 MG tablet Take 2 tablets by mouth daily as needed (Constipation) 4/28/22 5/28/22  Ronda Cardoso MD   baclofen (LIORESAL) 10 MG tablet Take 1 tablet by mouth 4 times daily 4/28/22   Ronda Cardoso MD   levothyroxine (SYNTHROID) 100 MCG tablet Take 1 tablet by mouth Daily 4/29/22   Ronda Cardoso MD   pantoprazole (PROTONIX) 40 MG tablet Take 1 tablet by mouth daily 4/29/22   Ronda Cardoso MD   ferrous sulfate (IRON 325) 325 (65 Fe) MG tablet Take 1 tablet by mouth 2 times daily (with meals) 1/13/22   Ronda Cardoso MD   busPIRone (BUSPAR) 30 MG tablet Take 30 mg by mouth 2 times daily 12/17/21   Felicia Lambert MD   Vitamin D, Ergocalciferol, 50 MCG (4198 UT) CAPS TAKE ONE CAPSULE BY MOUTH DAILY 3/29/21   Kartik Barbosa PA-C   methadone (DOLOPHINE) 10 MG tablet Take 10 mg by mouth 2 times daily.     Historical Provider, MD       Future Appointments   Date Time Provider [place]   6/8/2022 11:00 AM Jeffery Whiting Neuro [facility]   6/21/2022  1:30 PM MD Eder Liu/flipa [facility]   6/27/2022  3:20 PM Felicia Lambert MD [facility]

## 2022-05-18 ENCOUNTER — CARE COORDINATION (OUTPATIENT)
Dept: CARE COORDINATION | Age: 62
End: 2022-05-18

## 2022-05-18 ENCOUNTER — HOSPITAL ENCOUNTER (OUTPATIENT)
Dept: OCCUPATIONAL THERAPY | Age: 62
Setting detail: THERAPIES SERIES
Discharge: HOME | End: 2022-05-18
Payer: MEDICARE

## 2022-05-18 ENCOUNTER — HOSPITAL ENCOUNTER (OUTPATIENT)
Dept: PHYSICAL THERAPY | Age: 62
Setting detail: THERAPIES SERIES
Discharge: HOME | End: 2022-05-18
Payer: MEDICARE

## 2022-05-18 PROCEDURE — 97110 THERAPEUTIC EXERCISES: CPT

## 2022-05-18 PROCEDURE — 97112 NEUROMUSCULAR REEDUCATION: CPT

## 2022-05-18 ASSESSMENT — PAIN DESCRIPTION - PAIN TYPE: TYPE: ACUTE PAIN

## 2022-05-18 ASSESSMENT — PAIN DESCRIPTION - DESCRIPTORS: DESCRIPTORS: ACHING

## 2022-05-18 ASSESSMENT — PAIN DESCRIPTION - ORIENTATION: ORIENTATION: RIGHT;LEFT

## 2022-05-18 ASSESSMENT — PAIN SCALES - GENERAL: PAINLEVEL_OUTOF10: 6

## 2022-05-18 ASSESSMENT — PAIN DESCRIPTION - FREQUENCY: FREQUENCY: CONTINUOUS

## 2022-05-18 ASSESSMENT — PAIN DESCRIPTION - LOCATION: LOCATION: BACK;LEG

## 2022-05-18 NOTE — PROGRESS NOTES
[facility] Outpatient Physical Therapy   [address]   Phone: [phone]   Fax: [phone]    Physical Therapy Daily Treatment Note      Date:  2022  Patient Name:  Bonnie Ramon    :  1960  MRN: [id number]  Physician: Ronda Cardoso MD                            Insurance: Medicare - based on [place]   Medical Diagnosis:   Z16.650H (ICD-10-CM) - Spinal cord injury, cervical region, sequela ([facility] [address]   G82.54 (ICD-10-CM) - Incomplete quadriplegia at C5-6 level ([facility] [address]   G83.4 (ICD-10-CM) - Cauda equina syndrome ([facility] [address]   Rehab Codes: R25 pain , R53.1 weakness, Z74.0 reduced mobility, R29.6 high fall risk, R27.8 lack of coordination, R26.89 gait abnormality   Date of symptom onset: 21 -- cauda equina; 22 Cervical surgeries   Next 's appt. : 22 with Neurosurgery; 22 with PMR Dr. Zen Gaitan   PN needed by visit 10   Total Visits:   Cx/Ns:0/0     Precautions; c-collar must be worn due to s/p C2-C7 fixation; not lifting greater than a gallon of milk      Subjective:    Patient has no new complaints since last session. Patient states she has been trying to keep up with exercise at home.    Pain:  [x] Yes  [] No Location:B sides of neck leading to hands, lower back  Pain Rating: (0-10 scale) 5/10  Pain altered Tx:  [] No  [] Yes  Action:  Comments:    Objective:  Modalities:      Exercises:  Exercise Reps/ Time Weight/ Level Completed  Today Comments   nustep  5' Level 3 x    HS stretch at step 30\"x3 6' x    Step ups fwd/lat  10x  x Patient completed 20, despite therapist request of 10.cueing required to avoid over exerting    3 way hip  10x  x Very high ranges,cues to correct and activate proper musculature to avoid compensating    TKE  20x  red x    Slow high march  10x2  x    Heel/toe raises  10x2  x    B tandem stance  30\"x3  x    Rhomberg stance no hands  30\"x3  x           Ambulation in gym  150ft  x    Other:    Specific Instructions for next treatment:ambulation in parallel bars with minimal UE support,coordination ladder, STS with minimal UE support; begin with LE strengthening and balance work        Assessment:  5/18: Patient had good tolerance to today's session. Cueing required to avoid over excessive ranges especially with 3 way hip kicks to, and to only complete amount of repetitions requested by therapist to avoid over exerting muscles. Therapist observed lack of knee extension during gait while in gym that was addressed with gait training and stretching/strnegthening exercise in parallel bars. Patient did have increased fatigue at end of session in which therapist educated on activity modifications today to avoid further fatigue and muscle soreness. Patient required cues to complete better heel strike during gait in order to facilitate better knee extension. Encouraged patient to complete HEP on days she doesn't have PT. [x] Progressing toward goals. [] No change. [] Other:    [] Patient would continue to benefit from skilled physical therapy services in order to: improve strength,mobiltiy and motor control that impacts balance and mobility. STG: (to be met in 10 treatments)  1. Pt will improve score on ESCOBEDO balance scale by >/=6 points to improve overall balance stability and decrease fall risk with mobility. 2. Pt will demonstrate >10 SLS stability with light UE support to safely allow pt to enter/exit her tub shower. 3. Pt will improve time on TUG to <20s with LRAD at mod IND level to demonstrate decreased fall risk. 4. Pt will increase gait speed to >0.8 m/s  With LRAD at mod I level to demonstrate decreased fall risk and optimize mobility to at community level. 5. Pt will be able to manage her walker up 1 step without instability to improve independence with access to her home. 6.  Pt will improve time on 5xSTS to <15 seconds with use of 1 UE to decrease fall risk and increase functional capacity for mobility.    LTG: (to be met in 20 treatments)  1. Pt will improve score on ESCOBEDO balance scale by >/= 45/56 points  to improve overall balance stability and decrease fall risk with mobility. 2.  Pt will increase strength of all major muscle groups of the LEs to 4/5 or greater demonstrating improved strength needed to maximize safety and efficiency with mobility tasks such as gait, transfers and stairs. 3. Pt will be independent with home program addressing strength, flexibility and balance to maximize gains made in therapy to improve overall functional capacity for mobility. 4. Pt will improve score on OPTIMAL to 46/90 to demonstrate functional improvements with ADLs. 5. Pt will report no falls for x6 weeks demonstrating improved safety with mobility and implementation of fall prevention strategies. * further goals may be established based on additional assessments or progress*     Pt. Education:  [x] Yes  [] No  [] Reviewed Prior HEP/Ed  Method of Education: [x] Verbal  [] Demo  [] Written  Comprehension of Education:  [x] Verbalizes understanding. [] Demonstrates understanding. [x] Needs review. - review proper speed of reps when completing HEP   [] Demonstrates/verbalizes HEP/Ed previously given. Plan: [x] Continue per plan of care.    [] Other:      Treatment Charges: Mins Units   []  Modalities     [x]  Ther Exercise 30 2   []  Manual Therapy     []  Ther Activities     []  Aquatics     [x]  Neuromuscular 15 1   [] Vasocompression     [] Gait Training     [] Dry needling        [] 1 or 2 muscles        [] 3 or more muscles     []  Other     Total Treatment time 45 3     Time In:1130am           Time Out: 1215pm    Electronically signed by:  Marilu Cohen PTA

## 2022-05-18 NOTE — PROGRESS NOTES
[facility] [address] [place] [address]. [address]         Phone: [phone]       Fax: [phone]     Occupational Therapy Daily Treatment note     Occupational Therapy: Daily Note   Patient: Mary Meng (3 y.o. female)   Examination Date: 2022  No data recorded  Progress Note Counter: Eval  & tx 2-3 x/week for 6 wk     :  1960 # of Visits since [facility]:   2   MRN: [id number]  CSN: [id number] Start of Care Date:    Insurance: Payor: MEDICARE / Plan: MEDICARE PART A AND B / Product Type: *No Product type* /   Insurance ID: [id number] - (Medicare) Secondary Insurance (if applicable): Insurance Information: Medicare   Referring Physician: MD Dr Tres Retana   PCP: Mal Moritz, MD Visits to Date/Visits Approved:     No Show/Cancelled Appts:   /       Medical Diagnosis: Unspecified injury at unspecified level of cervical spinal cord, sequela [S14.109S]  Quadriplegia, C5-C7 incomplete [G82.54]  Cauda equina syndrome [G83.4] spinal cord injury , cervical region sequela (S14.109S), incomplete quadriplegia at C5-6 level (G82.54), cauda equina syndrome ( G83.4) - ICD-10 codes  Treatment Diagnosis: bilateral UE weakness & impaired coordination, bilateral shoulder stiffness, impaired sensation - hands lt more than rt  (M62.81, R27.9, M25.611, M25.612,R20.2        SUBJECTIVE EXAMINATION   Pain Level: Pain Screening  Patient Currently in Pain: Yes  Pain Level: 6  Patient's Stated Pain Goal: 0 - No pain  Pain Type: Acute pain  Pain Location: Back,Leg  Pain Orientation: Right,Left  Pain Descriptors: Aching  Pain Frequency: Continuous    Patient Comments: Subjective: Pt has chronic pain, pain at 6/10, mostly spine, and down into B legs, mostly left.     HEP Compliance: Good        OBJECTIVE EXAMINATION   Restrictions: Restrictions/Precautions: Fall Risk; General Precautions        TREATMENT     Exercises: impacting rehab : Pain tolerance/management          TREATMENT PLAN   REQUIRES OT FOLLOW-UP: Yes  Type: Outpatient  Plan  Plan Frequency: 2-3x/week  Plan Weeks: 18 visits  Current Treatment Recommendations: Strengthening,ROM,Patient/Caregiver education & training,Coordination training,Positioning  Plan Comment: continue OT       Therapy Time  Individual Time In: 2831  Individual Time Out: 9092  Minutes: 47  Timed Code Treatment Minutes: 47 Minutes       Electronically signed by SHERLYN Laws  on 5/18/2022 at 11:50 AM       Treatment Charges: Mins Units Time In/Out   [] Evaluation       []  Low       []  Moderate       []  High      []  Electrical Stim      []  Iontophoresis      []  Paraffin      []  Ultrasound        []  Masage      []  Neuromuscular Re-ed      []  ADL      [x]  Ther Exercise 47 3    []  Ther Activities      []  Neuro Re-Ed      []  Splinting      []  Other      Total Treatment time 47 min 3          POC NOTE

## 2022-05-18 NOTE — CARE COORDINATION
Left VM message asking patient to call me back at [phone] for care management follow up. She is scheduled for OP PT and OT visits. Will call again next week.

## 2022-05-23 ENCOUNTER — HOSPITAL ENCOUNTER (OUTPATIENT)
Dept: OCCUPATIONAL THERAPY | Age: 62
Setting detail: THERAPIES SERIES
Discharge: HOME | End: 2022-05-23
Payer: MEDICARE

## 2022-05-23 ENCOUNTER — HOSPITAL ENCOUNTER (OUTPATIENT)
Dept: PHYSICAL THERAPY | Age: 62
Setting detail: THERAPIES SERIES
Discharge: HOME | End: 2022-05-23
Payer: MEDICARE

## 2022-05-23 PROCEDURE — 97110 THERAPEUTIC EXERCISES: CPT

## 2022-05-23 PROCEDURE — 97112 NEUROMUSCULAR REEDUCATION: CPT

## 2022-05-23 ASSESSMENT — PAIN DESCRIPTION - LOCATION: LOCATION: NECK;BACK

## 2022-05-23 ASSESSMENT — PAIN DESCRIPTION - FREQUENCY: FREQUENCY: CONTINUOUS

## 2022-05-23 ASSESSMENT — PAIN DESCRIPTION - ORIENTATION: ORIENTATION: LEFT;RIGHT

## 2022-05-23 ASSESSMENT — PAIN SCALES - GENERAL: PAINLEVEL_OUTOF10: 5

## 2022-05-23 ASSESSMENT — PAIN DESCRIPTION - DESCRIPTORS: DESCRIPTORS: ACHING;SHOOTING;SHARP

## 2022-05-23 ASSESSMENT — PAIN DESCRIPTION - PAIN TYPE: TYPE: ACUTE PAIN

## 2022-05-23 NOTE — PROGRESS NOTES
[address]         Phone: [phone]       Fax: [phone]     Occupational Therapy Daily Treatment note     Occupational Therapy: Daily Note   Patient: Malik Martin (89 y.o. female)   Examination Date: 2022  No data recorded  Progress Note Counter: Eval  & tx 2-3 x/week for 6 wk     :  1960 # of Visits since [facility]:   3   MRN: [id number]  CSN: [id number] Start of Care Date:    Insurance: Payor: MEDICARE / Plan: MEDICARE PART A AND B / Product Type: *No Product type* /   Insurance ID: [id number] - (Medicare) Secondary Insurance (if applicable): Insurance Information: Medicare   Referring Physician: MD Dr Chhaya Zaragoza   PCP: Teresa Mcdaniel MD Visits to Date/Visits Approved:     No Show/Cancelled Appts:   /       Medical Diagnosis: Unspecified injury at unspecified level of cervical spinal cord, sequela [S14.109S]  Quadriplegia, C5-C7 incomplete [G82.54]  Cauda equina syndrome [G83.4] spinal cord injury , cervical region sequela (S14.109S), incomplete quadriplegia at C5-6 level (G82.54), cauda equina syndrome ( G83.4) - ICD-10 codes  Treatment Diagnosis: bilateral UE weakness & impaired coordination, bilateral shoulder stiffness, impaired sensation - hands lt more than rt  (M62.81, R27.9, M25.611, M25.612,R20.2        SUBJECTIVE EXAMINATION   Pain Level: Pain Screening  Patient Currently in Pain: Yes  Pain Level: 5  Best Pain Level: 5  Worst Pain Level: 9  Patient's Stated Pain Goal: 0 - No pain  Pain Type: Acute pain  Pain Location: Neck,Back  Pain Orientation: Left,Right (mostly left leg)  Pain Descriptors: Aching,Shooting,Sharp  Pain Frequency: Continuous    Patient Comments: Subjective: Pt saw her granddaughter this weekend, was happy about that, had [place].     HEP Compliance: Good        OBJECTIVE EXAMINATION   Restrictions: Restrictions/Precautions: Fall Risk; General Precautions     TREATMENT     Exercises: Therapeutic exercise (CPT 00875)   Treatment Reasoning    OT Exercise 1: PROM B UE, left shoulder trap tight, gentle vibrating tool to scapula and top of trapezius  OT Exercise 2: AROM 15x, B UE shoulder flexion/extension at 90 degrees, shoulder abduction/adduction at 90 degrees, forearm supination/pronation, arms extended pt complete wrist flexion/extension  OT Exercise 4: Yellow digiflex 25x all fingers, then each finger, B hands  OT Exercise 5: pylo ball, B hands, 15 times forward/back, side to side, CW and CCW  OT Exercise 6: PROM of B wrists, elbows, massage  OT Exercise 8: velcro board, remove 32 pieces left and place to right then place back on board, remove 32 pieces right and place to left then place back on board     Limitations addressed: Strength,Coordination   Therapist provided: Verbal cuing,Tactile cuing                       Patient Education:         ASSESSMENT     Assessment: Assessment: Pt seen for B UE strengthening, coordination, and fine motor control. PROM B UE, with emphasis on left scapula, top of trap, lightly massaging and gently using vibrating tool to decrease tightness. Therapeutic exercises completed, increased resistance and repetitions on several exercises, tolerated well, see OT note for details.   Performance deficits / Impairments: Decreased ROM,Decreased strength,Decreased coordination,Decreased fine motor control,Decreased high-level IADLs,Decreased sensation    Post-Treatment Pain Level:      Prognosis: Good    Activity Tolerance: Patient tolerated treatment well                  TREATMENT PLAN   REQUIRES OT FOLLOW-UP: Yes  Type: Outpatient  Plan  Plan Frequency: 2-3x/week  Plan Weeks: 18 visits  Current Treatment Recommendations: Strengthening,ROM,Patient/Caregiver education & training,Coordination training,Positioning  Plan Comment: continue OT       Therapy Time  Individual Time In: 1016  Individual Time Out: 1101  Minutes: 45  Timed Code Treatment Minutes: 45 Minutes       Electronically signed by SHERLYN Lacy  on 5/23/2022 at 12:10 PM       Treatment Charges: Mins Units Time In/Out   [] Evaluation       []  Low       []  Moderate       []  High      []  Electrical Stim      []  Iontophoresis      []  Paraffin      []  Ultrasound        []  Masage      []  Neuromuscular Re-ed      []  ADL      [x]  Ther Exercise 45 3    []  Ther Activities      []  Neuro Re-Ed      []  Splinting      []  Other      Total Treatment time 45 min 3          POC NOTE

## 2022-05-23 NOTE — PROGRESS NOTES
[facility] Outpatient Physical Therapy   [address]   Phone: [phone]   Fax: [phone]    Physical Therapy Daily Treatment Note      Date:  2022  Patient Name:  Danial Rawls    :  1960  MRN: [id number]  Physician: Erika Hanson MD                            Insurance: [place] - [place]   Medical Diagnosis:   O74.451J (ICD-10-CM) - Spinal cord injury, cervical region, sequela ([facility] [address]   G82.54 (ICD-10-CM) - Incomplete quadriplegia at C5-6 level ([facility] [address]   G83.4 (ICD-10-CM) - Cauda equina syndrome ([facility] [address]   Rehab Codes: R25 pain , R53.1 weakness, Z74.0 reduced mobility, R29.6 high fall risk, R27.8 lack of coordination, R26.89 gait abnormality   Date of symptom onset: 22 -- cauda equina; 22 Cervical surgeries   Next 's appt. : 22 with Neurosurgery; 22 with PMR Dr. Karen Peña   PN needed by visit 10     Total Visits: 3/20  Cx/Ns:0/0     Precautions: c-collar must be worn due to s/p C2-C7 fixation; not lifting greater than a gallon of milk: Left AFO  Subjective:    Soreness after last visit but states it was a good work out and the most she has done since [employer]. Denies recent falls. Has not yet gotten a hold of [employer] regarding AFO adjustments. Wearing bone stimulator 4 hrs daily.    Pain:  [x] Yes  [] No Location:B sides of posterior neck leading to hands, lower back down to LE's  Pain Rating: (0-10 scale) 5/10  Pain altered Tx:  [x] No  [] Yes  Action:  Comments:    Objective:  Modalities:      Exercises:  Exercise Reps/ Time Weight/ Level Completed  Today Comments   nustep  5' Level 3 x U/LE warm up   Sit Stand Staggered- Left back  (Chair + foam) 5x  X           HS stretch at step 30\"x3 6\" x    Step ups Fwd/Lat 10x 4\" x Less UE support encouraged in // bars along with upright head posture as patient demonstrates visual reliance; modified to 4\" step with less UE support due to L LE buckling on 6\"    3 way hip  10x  x NO UE Support   Forced WB on 6\" step 1' R LE  2x30\" L LE  X Encouraged light touch; WB L LE more difficult than R LE   TKE  20x  red     Slow high march w/ opposite UE raise 10x  x Try no UE Support- difficulty with coordination   Heel/toe raises  10x2      Semi tandem Stance  2x30\"  X NO UE Support   Foam feet close standing balance 2x30\"   x NO UE Support   Feet together Eye Cl[employer] surface 2x30\"   x NO UE Support- VERY difficult   Amb in // Bars Fwd/Retro/Side 3 laps  x Light use of B UEs ; emphasis on equal step length/heigth, encouraged less visual reliance; L LE ataxia noted   Ambulation in gym  150ft      Other:    Specific Instructions for next treatment: Progress with LE strengthening, stability and coordination work; challenge patient to decrease visual reliance for increased proprioceptive input. Assessment:    [x] Progressing toward goals. Progressed with patient as noted above emphasizing endurance, LE strength and challenging balance. Patient continues to demonstrate decreased L LE coordination and strength- challenged patient without UE support this date and to decrease visual reliance with activity. Patient fatigues easily- 3 brief seated breaks given. Patient educated to avoid over fatigue with exercise program for proper neuro re-education post surgery. Patient demonstrates good effort and eager to recover. [] No change. [] Other:    [x] Patient would continue to benefit from skilled physical therapy services in order to: improve strength,mobiltiy and motor control that impacts balance and mobility. STG: (to be met in 10 treatments)  1. Pt will improve score on ESCOBEDO balance scale by >/=6 points to improve overall balance stability and decrease fall risk with mobility. 2. Pt will demonstrate >10 SLS stability with light UE support to safely allow pt to enter/exit her tub shower. 3. Pt will improve time on TUG to <20s with LRAD at mod IND level to demonstrate decreased fall risk.    4. Pt will increase gait speed to >0.8 m/s  With LRAD at mod I level to demonstrate decreased fall risk and optimize mobility to at community level. 5. Pt will be able to manage her walker up 1 step without instability to improve independence with access to her home. 6.  Pt will improve time on 5xSTS to <15 seconds with use of 1 UE to decrease fall risk and increase functional capacity for mobility. LTG: (to be met in 20 treatments)  1. Pt will improve score on ESCOBEDO balance scale by >/= 45/56 points  to improve overall balance stability and decrease fall risk with mobility. 2.  Pt will increase strength of all major muscle groups of the LEs to 4/5 or greater demonstrating improved strength needed to maximize safety and efficiency with mobility tasks such as gait, transfers and stairs. 3. Pt will be independent with home program addressing strength, flexibility and balance to maximize gains made in therapy to improve overall functional capacity for mobility. 4. Pt will improve score on OPTIMAL to 46/90 to demonstrate functional improvements with ADLs. 5. Pt will report no falls for x6 weeks demonstrating improved safety with mobility and implementation of fall prevention strategies. * further goals may be established based on additional assessments or progress*     Patient stated Goals: to walk without a walker, improve balance     Pt. Education:  [x] Yes  [] No  [x] Reviewed Prior HEP/Ed  Method of Education: [x] Verbal  [x] Demo  [] Written  Comprehension of Education:  [x] Verbalizes understanding. [] Demonstrates understanding. [] Needs review. [] Demonstrates/verbalizes HEP/Ed previously given. Plan: [x] Continue per plan of care.    [] Other:      Treatment Charges: Mins Units   []  Modalities     []  Ther Exercise     []  Manual Therapy     []  Ther Activities     []  Aquatics     [x]  Neuromuscular 50 3   [] Vasocompression     [] Gait Training     [] Dry needling        [] 1 or 2 muscles        [] 3 or more muscles     []  Other     Total Treatment time 50 3     Time In: 1101 AM         Time Out: 5093 AM    Electronically signed by:  Karissa Monae PTA

## 2022-05-25 ENCOUNTER — HOSPITAL ENCOUNTER (OUTPATIENT)
Dept: PHYSICAL THERAPY | Age: 62
Setting detail: THERAPIES SERIES
Discharge: HOME | End: 2022-05-25
Payer: MEDICARE

## 2022-05-25 ENCOUNTER — HOSPITAL ENCOUNTER (OUTPATIENT)
Dept: OCCUPATIONAL THERAPY | Age: 62
Setting detail: THERAPIES SERIES
Discharge: HOME | End: 2022-05-25
Payer: MEDICARE

## 2022-05-25 ENCOUNTER — CARE COORDINATION (OUTPATIENT)
Dept: CARE COORDINATION | Age: 62
End: 2022-05-25

## 2022-05-25 PROCEDURE — 97112 NEUROMUSCULAR REEDUCATION: CPT

## 2022-05-25 PROCEDURE — 97110 THERAPEUTIC EXERCISES: CPT

## 2022-05-25 ASSESSMENT — PAIN DESCRIPTION - LOCATION
LOCATION: NECK;BACK;LEG
LOCATION: NECK;BACK;LEG

## 2022-05-25 ASSESSMENT — PAIN SCALES - GENERAL
PAINLEVEL_OUTOF10: 5
PAINLEVEL_OUTOF10: 5

## 2022-05-25 ASSESSMENT — PAIN DESCRIPTION - DESCRIPTORS
DESCRIPTORS: ACHING
DESCRIPTORS: ACHING

## 2022-05-25 ASSESSMENT — PAIN DESCRIPTION - FREQUENCY
FREQUENCY: CONTINUOUS
FREQUENCY: CONTINUOUS

## 2022-05-25 ASSESSMENT — PAIN DESCRIPTION - PAIN TYPE
TYPE: ACUTE PAIN
TYPE: ACUTE PAIN

## 2022-05-25 ASSESSMENT — PAIN DESCRIPTION - ORIENTATION
ORIENTATION: RIGHT;LEFT
ORIENTATION: LEFT;RIGHT

## 2022-05-25 NOTE — PROGRESS NOTES
[address]         Phone: [phone]       Fax: [phone]     Occupational Therapy Daily Treatment note     Occupational Therapy: Daily Note   Patient: Earnest Hicks (66 y.o. female)   Examination Date: 2022  No data recorded  Progress Note Counter: Eval  & tx 2-3 x/week for 6 wk     :  1960 # of Visits since [facility]:   4   MRN: [id number]  CSN: [id number] Start of Care Date:    Insurance: Payor: MEDICARE / Plan: MEDICARE PART A AND B / Product Type: *No Product type* /   Insurance ID: [id number] - (Medicare) Secondary Insurance (if applicable): Insurance Information: Medicare   Referring Physician: MD Dr Beba Mario   PCP: Ian Bailey MD Visits to Date/Visits Approved:     No Show/Cancelled Appts:   /       Medical Diagnosis: Unspecified injury at unspecified level of cervical spinal cord, sequela [S14.109S]  Quadriplegia, C5-C7 incomplete [G82.54]  Cauda equina syndrome [G83.4] spinal cord injury , cervical region sequela (S14.109S), incomplete quadriplegia at C5-6 level (G82.54), cauda equina syndrome ( G83.4) - ICD-10 codes  Treatment Diagnosis: bilateral UE weakness & impaired coordination, bilateral shoulder stiffness, impaired sensation - hands lt more than rt  (M62.81, R27.9, M25.611, M25.612,R20.2        SUBJECTIVE EXAMINATION   Pain Level: Pain Screening  Patient Currently in Pain: Yes  Pain Level: 5  Best Pain Level: 5  Worst Pain Level: 9  Post Treatment Pain Level: 6  Patient's Stated Pain Goal: 0 - No pain  Pain Type: Acute pain  Pain Location: Neck,Back,Leg  Pain Orientation: Left,Right  Pain Descriptors: Aching  Pain Frequency: Continuous    Patient Comments: Subjective: Pt in 5/10 pain, \"it never goes away\".     HEP Compliance: Good        OBJECTIVE EXAMINATION   Restrictions: Restrictions/Precautions: Fall Risk; General Precautions TREATMENT     Exercises:     Treatment Reasoning    OT Exercise 1: PROM B UE, left shoulder trap tight, gentle vibrating tool to scapula and top of trapezius  OT Exercise 4: Yellow digiflex 25x all fingers, then each finger, B hands  OT Exercise 5: pylo ball, B hands, 20 times forward/back, side to side, CW and CCW  OT Exercise 6: PROM of B wrists, elbows, massage  OT Exercise 8: velcro board, remove 32 pieces left and place to right then place back on board, remove 32 pieces right and place to left then place back on board  OT Exercise 9: Arm bike, 5 minutes, 2 1/2 minutes forward and 2 1/2 minutes backward  OT Exercise 10: BTE #502 B wrists for flexion/extension, T = 4 lbs. , 60 seconds, pt stating \"I can feel it way more in the left than the right\"  OT Exercise 11: BTE #162 hand gripper, T = 10 in lbs, 60 seconds;  pinch , T = 7 in lbs, 60 seconds.  Limitations addressed: Strength,Coordination   Therapist provided: Verbal cuing,Tactile cuing   Progressed: added arm bike, BTE exercises                       Patient Education:         ASSESSMENT     Assessment: Assessment: OT focus today B UE strengthening, coordination, and fine motor control. PROM B UE, massage wrists/forearms, to decrease tightness. Added arm bike and various BTE exercises today, see OT note for details. Tolerated well.   Performance deficits / Impairments: Decreased ROM,Decreased strength,Decreased coordination,Decreased fine motor control,Decreased high-level IADLs,Decreased sensation    Post-Treatment Pain Level: 6    Prognosis: Good    Activity Tolerance: Patient tolerated treatment well                  TREATMENT PLAN   REQUIRES OT FOLLOW-UP: Yes  Type: Outpatient  Plan  Plan Frequency: 2-3x/week  Plan Weeks: 18 visits  Current Treatment Recommendations: Strengthening,ROM,Patient/Caregiver education & training,Coordination training,Positioning  Plan Comment: continue OT       Therapy Time  Individual Time In: 5343  Individual Time Out: 1130  Minutes: 43  Timed Code Treatment Minutes: 43 Minutes       Electronically signed by SHERLYN Pereira  on 5/25/2022 at 3:25 PM       Treatment Charges: Mins Units Time In/Out   [] Evaluation       []  Low       []  Moderate       []  High      []  Electrical Stim      []  Iontophoresis      []  Paraffin      []  Ultrasound        []  Masage      []  Neuromuscular Re-ed      []  ADL      [x]  Ther Exercise 43 3    []  Ther Activities      []  Neuro Re-Ed      []  Splinting      []  Other      Total Treatment time 43 min 3          POC NOTE

## 2022-05-25 NOTE — CARE COORDINATION
Ambulatory Care Coordination Note  5/25/2022  CM Risk Score: 5  Charlson 10 Year Mortality Risk Score: 47%     ACC: Federica Schilling RN    Summary Note: She is feeling well. Going to PT and OT, has no concerns or issues. Wears cervical collar. Still with some neck pain but tolerable. Worse after therapy for a short while but able to do ADLs. Feels arms slowly getting stronger. Denied bladder or bowel symptoms. Eating well. Neuro appt in 2 weeks. CC Plan:   -Follow in a month to assess symptoms, therapy progress, review progress notes, any needs. General Assessment    Do you have any symptoms that are causing concern?: No           Lab Results     None          Care Coordination Interventions    Referral from Primary Care Provider: No  Suggested Interventions and Community Resources  Occupational Therapy: Completed  Physical Therapy: Completed         Goals Addressed                 This Visit's Progress     Conditions and Symptoms   On track     I will schedule office visits, as directed by my provider. I will keep my appointment or reschedule if I have to cancel. I will notify my provider of any barriers to my plan of care. I will notify my provider of any symptoms that indicate a worsening of my condition. Barriers: lack of education  Plan for overcoming my barriers: ACM calls, education  Confidence: 9/10  Anticipated Goal Completion Date: 7/30/22              Prior to Admission medications    Medication Sig Start Date End Date Taking?  Authorizing Provider   lisinopril (PRINIVIL;ZESTRIL) 5 MG tablet Take 1 tablet by mouth daily 4/29/22   Edie Kline MD   dilTIAZem (CARDIZEM CD) 180 MG extended release capsule Take 1 capsule by mouth daily 4/29/22   Edie Kline MD   fluticasone [facility]) 50 MCG/ACT nasal spray 1 spray by Each Nostril route daily 4/29/22   Edie Kline MD   bisacodyl (DULCOLAX) 10 MG suppository Place 1 suppository rectally every 48 hours  Patient taking differently: Place 10 mg rectally every 48 hours Uses PRN 4/29/22 5/29/22  Amaury Cisneros MD   docusate sodium (COLACE) 100 MG capsule Take 2 capsules by mouth 2 times daily 4/28/22   Amaury Cisneros MD   senna (SENOKOT) 8.6 MG tablet Take 2 tablets by mouth daily as needed (Constipation) 4/28/22 5/28/22  Amaury Cisneros MD   baclofen (LIORESAL) 10 MG tablet Take 1 tablet by mouth 4 times daily 4/28/22   Amaury Cisneros MD   levothyroxine (SYNTHROID) 100 MCG tablet Take 1 tablet by mouth Daily 4/29/22   Amaury Cisneros MD   pantoprazole (PROTONIX) 40 MG tablet Take 1 tablet by mouth daily 4/29/22   Amaury Cisneros MD   ferrous sulfate (IRON 325) 325 (65 Fe) MG tablet Take 1 tablet by mouth 2 times daily (with meals) 1/13/22   Amaury Cisneros MD   busPIRone (BUSPAR) 30 MG tablet Take 30 mg by mouth 2 times daily 12/17/21   Radha Block MD   Vitamin D, Ergocalciferol, 50 MCG (1145 UT) CAPS TAKE ONE CAPSULE BY MOUTH DAILY 3/29/21   Rica Barton PA-C   methadone (DOLOPHINE) 10 MG tablet Take 10 mg by mouth 2 times daily.     Historical Provider, MD       Future Appointments   Date Time Provider [place]   6/1/2022 10:45 AM Lidia Hall OT [facility]   6/1/2022 11:30 AM Zully Guerra, PT [facility]   6/3/2022  9:00 AM Lidia Hall OT [facility]   6/3/2022  9:45 AM Zully Guerra, PT [facility]   6/6/2022 10:30 AM Lidia Hall OT [facility]   6/6/2022 11:15 AM Lindy George, PTA [facility]   6/8/2022 11:00 AM Chyna Stern   6/10/2022 10:45 AM Zully Guerra, PT [facility]   6/10/2022 11:30 AM Lidia Hall, OT [facility]   6/21/2022  1:30 [facility]   6/27/2022  3:20 PM MD DANITA Oswald PC Juan Stern

## 2022-05-25 NOTE — PROGRESS NOTES
[address] [facility]   [address] [facility] [address]   Phone: [phone]   Fax: [phone]    Physical Therapy Daily Treatment Note      Date:  2022  Patient Name:  Lynn Miller    :  1960  MRN: [id number]  Physician: Kennedi Smith MD                            Insurance: Medicare - based on [place]   Medical Diagnosis:   G28.909S (ICD-10-CM) - Spinal cord injury, cervical region, sequela ([facility] [address]   G82.54 (ICD-10-CM) - Incomplete quadriplegia at C5-6 level ([facility] [address]   G83.4 (ICD-10-CM) - Cauda equina syndrome ([facility] [address]   Rehab Codes: R25 pain , R53.1 weakness, Z74.0 reduced mobility, R29.6 high fall risk, R27.8 lack of coordination, R26.89 gait abnormality   Date of symptom onset: 21 -- cauda equina; 22 Cervical surgeries   Next 's appt. : 22 with Neurosurgery; 22 with PMR Dr. Cabrera    PN needed by visit 10     Total Visits:   Cx/Ns:0/0     Precautions: c-collar must be worn due to s/p C2-C7 fixation; not lifting greater than a gallon of milk: Left AFO      Subjective:    Patient reports she is overall sore after sessions of OT/PT but does not last more then 1-2 days after session. Denies falls since last session.      Pain:  [x] Yes  [] No Location:B sides of posterior neck leading to hands, lower back down to LE's  Pain Rating: (0-10 scale) 5/10  Pain altered Tx:  [x] No  [] Yes  Action:  Comments:    Objective:  Modalities:      Exercises:  Exercise Reps/ Time Weight/ Level Completed  Today Comments   nustep  5' Level 3 x U/LE warm up   Sit Stand Staggered- Left back  (Chair + foam) 5x             HS stretch at step 30\"x3 6\"     Step ups Fwd/Lat 10x2 4\" x NO UE support    3 way hip  10x2  x NO UE Support   Forced WB on 6\" step 1' R LE  2x30\" L LE   Encouraged light touch; WB L LE more difficult than R LE   TKE  20x  red x    Slow high march w/ opposite UE raise 10x  x Try no UE Support- difficulty with coordination   Heel/toe raises  10x2 Semi tandem Stance  2x30\"  X NO UE Support   Foam feet close standing balance 2x30\"   x NO UE Support   Feet together Eye Closed Firm surface 2x30\"   x NO UE Support- VERY difficult   Amb in // Bars Fwd/Retro/Side 3 laps  x Light use of B UEs ; emphasis on equal step length/heigth, encouraged less visual reliance; L LE ataxia noted   Ambulation in gym  150ft      Other:    Specific Instructions for next treatment: Progress with LE strengthening, stability and coordination work; challenge patient to decrease visual reliance for increased proprioceptive input. Use ankle weight to increase proprioceptive input     Assessment:    [x] Progressing toward goals. 5/25: Completed all exercises to address LE strength,stability, and proprioceptive deficits. Patient required constant cueing throughout session to have proper pacing with exercise repetitions, and keep core engagement with postural awareness. Encouraged throughout session to challenge vestibular system without use of visual feedback as patient continues to look down for most treatment interventions. Most fatigue noted with step ups on 4 inch step but able to complete all repetitions without UE support unless she had LOB/instability. Patient had x1 instance of LOB that required Min A from therapist to correct. Patient was CGA entire session. [] No change. [] Other:    [x] Patient would continue to benefit from skilled physical therapy services in order to: improve strength,mobiltiy and motor control that impacts balance and mobility. STG: (to be met in 10 treatments)   Pt will improve score on ESCOBEDO balance scale by >/=6 points to improve overall balance stability and decrease fall risk with mobility. Pt will demonstrate >10 SLS stability with light UE support to safely allow pt to enter/exit her tub shower. Pt will improve time on TUG to <20s with LRAD at mod IND level to demonstrate decreased fall risk.    Pt will increase gait speed to >0.8 m/s With LRAD at mod I level to demonstrate decreased fall risk and optimize mobility to at community level. Pt will be able to manage her walker up 1 step without instability to improve independence with access to her home. Pt will improve time on 5xSTS to <15 seconds with use of 1 UE to decrease fall risk and increase functional capacity for mobility. LTG: (to be met in 20 treatments)  Pt will improve score on ESCOBEDO balance scale by >/= 45/56 points  to improve overall balance stability and decrease fall risk with mobility. Pt will increase strength of all major muscle groups of the LEs to 4/5 or greater demonstrating improved strength needed to maximize safety and efficiency with mobility tasks such as gait, transfers and stairs. Pt will be independent with home program addressing strength, flexibility and balance to maximize gains made in therapy to improve overall functional capacity for mobility. Pt will improve score on OPTIMAL to 46/90 to demonstrate functional improvements with ADLs. Pt will report no falls for x6 weeks demonstrating improved safety with mobility and implementation of fall prevention strategies. * further goals may be established based on additional assessments or progress*     Patient stated Goals: to walk without a walker, improve balance     Pt. Education:  [x] Yes  [] No  [x] Reviewed Prior HEP/Ed  Method of Education: [x] Verbal  [x] Demo  [] Written  Comprehension of Education:  [x] Verbalizes understanding. [] Demonstrates understanding. [] Needs review. [] Demonstrates/verbalizes HEP/Ed previously given. Plan: [x] Continue per plan of care.    [] Other:      Treatment Charges: Mins Units   []  Modalities     []  Ther Exercise     []  Manual Therapy     []  Ther Activities     []  Aquatics     [x]  Neuromuscular 42 3   [] Vasocompression     [] Gait Training     [] Dry needling        [] 1 or 2 muscles        [] 3 or more muscles     []  Other     Total Treatment time 42 3     Time In: 1130 AM         Time Out: 1212PM    Electronically signed by:  Nelsy Jefferson PTA

## 2022-06-01 ENCOUNTER — HOSPITAL ENCOUNTER (OUTPATIENT)
Dept: GENERAL RADIOLOGY | Facility: CLINIC | Age: 62
Discharge: HOME | End: 2022-06-03
Payer: MEDICARE

## 2022-06-01 ENCOUNTER — HOSPITAL ENCOUNTER (OUTPATIENT)
Dept: PHYSICAL THERAPY | Age: 62
Setting detail: THERAPIES SERIES
Discharge: HOME | End: 2022-06-01
Payer: MEDICARE

## 2022-06-01 ENCOUNTER — HOSPITAL ENCOUNTER (OUTPATIENT)
Facility: CLINIC | Age: 62
Discharge: HOME | End: 2022-06-03
Payer: MEDICARE

## 2022-06-01 ENCOUNTER — HOSPITAL ENCOUNTER (OUTPATIENT)
Dept: OCCUPATIONAL THERAPY | Age: 62
Setting detail: THERAPIES SERIES
Discharge: HOME | End: 2022-06-01
Payer: MEDICARE

## 2022-06-01 DIAGNOSIS — M53.2X2 CERVICAL SPINE INSTABILITY: ICD-10-CM

## 2022-06-01 DIAGNOSIS — Z98.1 S/P CERVICAL SPINAL FUSION: ICD-10-CM

## 2022-06-01 DIAGNOSIS — G95.9 CERVICAL MYELOPATHY (HCC): ICD-10-CM

## 2022-06-01 PROCEDURE — 72040 X-RAY EXAM NECK SPINE 2-3 VW: CPT

## 2022-06-01 PROCEDURE — 97110 THERAPEUTIC EXERCISES: CPT

## 2022-06-01 ASSESSMENT — PAIN DESCRIPTION - LOCATION: LOCATION: ARM;LEG

## 2022-06-01 ASSESSMENT — PAIN DESCRIPTION - ORIENTATION: ORIENTATION: LEFT

## 2022-06-01 ASSESSMENT — PAIN SCALES - GENERAL: PAINLEVEL_OUTOF10: 6

## 2022-06-01 ASSESSMENT — PAIN DESCRIPTION - FREQUENCY: FREQUENCY: CONTINUOUS

## 2022-06-01 NOTE — FLOWSHEET NOTE
[facility]   [address]   Phone: [phone]   Fax: [phone]    Physical Therapy Daily Treatment Note      Date:  2022  Patient Name:  Malik Martin    :  1960  MRN: [id number]  Physician: Sari Benavidez MD                            Insurance: Medicare - based on [place]   Medical Diagnosis:   R84.936L (ICD-10-CM) - Spinal cord injury, cervical region, sequela ([facility] [address]   G82.54 (ICD-10-CM) - Incomplete quadriplegia at C5-6 level ([facility] [address]   G83.4 (ICD-10-CM) - Cauda equina syndrome ([facility] [address]   Rehab Codes: R25 pain , R53.1 weakness, Z74.0 reduced mobility, R29.6 high fall risk, R27.8 lack of coordination, R26.89 gait abnormality   Date of symptom onset: 21 -- cauda equina; 22 Cervical surgeries   Next 's appt. : 22 with Neurosurgery; 22 with PMR Dr. Art Madrigal   Total Visits:   Cx/Ns:0/0   PN needed by visit 10     Precautions: c-collar must be worn due to s/p C2-C7 fixation; not lifting greater than a gallon of milk: Left AFO      Subjective:    Patient reports she is having some feelings of stiffness mainly in the LLE. Also feeling some neck pain but its controlled. Denies any falls or significant changes since last session.      Pain:  [x] Yes  [] No Location: LLE  Pain Rating: (0-10 scale) 5/10  Pain altered Tx:  [x] No  [] Yes  Action:  Comments:    Objective:  Modalities:      Exercises:  Exercise Reps/ Time Weight/ Level Completed  Today Comments   nustep  5' Level 3 x BUE/BLE           MAT LEVEL     Elevated with large wedge and 2 pillows    SKTC  2x30s   x L ONLY    HS stretch  2x30s ea  x    Hip ER stretch  2x30s   x L Only   glute bridges  2x10  x    hooklying clamshells  2x10 Red  x    Sitting EOM LAQ 2x10 ea red x Seated EOM without back support for core strengthening           Sit Stand Staggered stance   3x5 ea   x From raised mat table; some assist for keeping LLE in place           HS stretch at step 30\"x3 6\" Step ups Fwd/Lat 10x2 4\"  NO UE support    3 way hip  10x ea Red  x NO UE Support; more of a stepping motion    Forced WB on 6\" step 1' R LE  2x30\" L LE   Encouraged light touch; WB L LE more difficult than R LE   TKE  20x  red     Slow high march w/ opposite UE raise 10x ea  x    Heel/toe raises  10x2      Semi tandem Stance  2x30\"   NO UE Support   Foam feet close standing balance 2x30\"    NO UE Support   Feet together Eye Closed Firm surface 2x30\"    NO UE Support- VERY difficult   Amb in // Bars Fwd/Retro/Side 3 laps   Light use of B UEs ; emphasis on equal step length/heigth, encouraged less visual reliance; L LE ataxia noted   Ambulation in gym  150ft      Other:    Specific Instructions for next treatment: Progress with LE strengthening, stability and coordination work; challenge patient to decrease visual reliance for increased proprioceptive input. Use ankle weight to increase proprioceptive input     Assessment:    [x] Progressing toward goals. Began session with nustep to increase capacity for mobility. Pt noting increased LLE tightness so completed stretching to reduce this. Pt feels better post stretching. Since in supine completed strengthening for proximal musculature. Then completed seated EOM exercise for core strengthening. Completed additional standing work to increase strength  And stability for progression of ambulation. Pt is appropriately fatigued by end of session. [] No change. [] Other:    [x] Patient would continue to benefit from skilled physical therapy services in order to: improve strength,mobiltiy and motor control that impacts balance and mobility. STG: (to be met in 10 treatments)  1. Pt will improve score on ESCOBEDO balance scale by >/=6 points to improve overall balance stability and decrease fall risk with mobility. 2. Pt will demonstrate >10 SLS stability with light UE support to safely allow pt to enter/exit her tub shower.   3. Pt will improve time on TUG to <20s with LRAD at mod IND level to demonstrate decreased fall risk. 4. Pt will increase gait speed to >0.8 m/s  With LRAD at mod I level to demonstrate decreased fall risk and optimize mobility to at community level. 5. Pt will be able to manage her walker up 1 step without instability to improve independence with access to her home. 6.  Pt will improve time on 5xSTS to <15 seconds with use of 1 UE to decrease fall risk and increase functional capacity for mobility. LTG: (to be met in 20 treatments)  1. Pt will improve score on ESCOBEDO balance scale by >/= 45/56 points  to improve overall balance stability and decrease fall risk with mobility. 2.  Pt will increase strength of all major muscle groups of the LEs to 4/5 or greater demonstrating improved strength needed to maximize safety and efficiency with mobility tasks such as gait, transfers and stairs. 3. Pt will be independent with home program addressing strength, flexibility and balance to maximize gains made in therapy to improve overall functional capacity for mobility. 4. Pt will improve score on OPTIMAL to 46/90 to demonstrate functional improvements with ADLs. 5. Pt will report no falls for x6 weeks demonstrating improved safety with mobility and implementation of fall prevention strategies. * further goals may be established based on additional assessments or progress*     Patient stated Goals: to walk without a walker, improve balance     Pt. Education:  [x] Yes  [] No  [x] Reviewed Prior HEP/Ed  Method of Education: [x] Verbal  [x] Demo  [] Written  Comprehension of Education:  [x] Verbalizes understanding. [] Demonstrates understanding. [] Needs review. [] Demonstrates/verbalizes HEP/Ed previously given. Plan: [x] Continue per plan of care.    [] Other:      Treatment Charges: Mins Units   []  Modalities     [x]  Ther Exercise 38 3   []  Manual Therapy     []  Ther Activities     []  Aquatics     [x]  Neuromuscular 5 --   []

## 2022-06-01 NOTE — PROGRESS NOTES
[facility] [address] [place] [address]. [address]         Phone: [phone]       Fax: [phone]     Occupational Therapy Daily Treatment note     Occupational Therapy: Daily Note   Patient: Julio Guaman (57 y.o. female)   Examination Date: 2022  No data recorded  Progress Note Counter: Eval  & tx 2-3 x/week for 6 wk. MD appt 2022     :  1960 # of Visits since [facility]:   5   MRN: [id number]  CSN: [id number] Start of Care Date:    Insurance: Payor: MEDICARE / Plan: MEDICARE PART A AND B / Product Type: *No Product type* /   Insurance ID: [id number] - (Medicare) Secondary Insurance (if applicable): Insurance Information: Medicare   Referring Physician: MD Dr Sindhu Fowler   PCP: Dayanna Yates MD Visits to Date/Visits Approved:     No Show/Cancelled Appts:   /       Medical Diagnosis: Unspecified injury at unspecified level of cervical spinal cord, sequela [S14.109S]  Quadriplegia, C5-C7 incomplete [G82.54]  Cauda equina syndrome [G83.4] spinal cord injury , cervical region sequela (S14.109S), incomplete quadriplegia at C5-6 level (G82.54), cauda equina syndrome ( G83.4) - ICD-10 codes  Treatment Diagnosis: bilateral UE weakness & impaired coordination, bilateral shoulder stiffness, impaired sensation - hands lt more than rt  (M62.81, R27.9, M25.611, M25.612,R20.2        SUBJECTIVE EXAMINATION   Pain Level: Pain Screening  Patient Currently in Pain: Yes  Pain Assessment: 0-10  Pain Level: 6  Post Treatment Pain Level: 6  Patient's Stated Pain Goal: 0 - No pain  Pain Location: Arm,Leg  Pain Orientation: Left  Pain Radiating Towards: Down lt arm to palm and tight lt leg. Pain Descriptors: Aching,Tightness,Pins and needles (pins & needles both hands.)  Pain Frequency: Continuous    Patient Comments: Subjective: Pt reports increase lt arm/lt leg pain & swelling lt foot.  Pt states she aches all over, has arthritis. Pt c/o muscle tightness lt hand  & pins/needles both hands. HEP Compliance: Good  Comment: Pt wearing ASPEN collar. OBJECTIVE EXAMINATION   Restrictions: Restrictions/Precautions: Fall Risk; General Precautions       TREATMENT     Exercises:     Treatment Reasoning    OT Exercise 1: PROM B UE,shoulders, wrist/hand  OT Exercise 3: Desensitization exercises with towel B hands, pull in then push out, 10x  OT Exercise 4: Yellow digiflex 25x gripping bilaeral hands, then each finger oppositional pinch 25x, B hands  OT Exercise 5: pylo ball on table stretch /rom  , B hands, 25 times forward/back, side to side, CW and CCW  OT Exercise 6: hand based skate board  15x each way using rt hand, using lt hand : forward/back, side to side, circles cw & cccw  OT Exercise 7: cone  unstacking/stacking 5 cones 2 sets each way  on table: reaching at diagonal using lt hand, reaching at diagonal  using rt hand  OT Exercise 8: velcro board, remove 32 pieces left and place to right then place back on board, remove 32 pieces right and place to left then place back on board  OT Exercise 12: bilateral wrist strengthening (wrist on ramps ) 1# 20x each way rt/lt ( flex/extension palm down, flex/extension palm up, RD/UD)    Limitations addressed: Strength,Coordination,Flexibility (ROM)  Therapist provided: Verbal cuing  Progressed: Resistance,Repetitions  Progressed: Progressed to bilateral wrist strengthening using 1#, hand based skateboard  on table for shoulder ROM, cone stacking for coordination & UE reaching. ASSESSMENT     Assessment: Assessment: Pt has lt sided UE/LE pain today. OT tx focus on bilateral UE rom, coordination, & light strengthening using 1# for wrist exercises. OT Progressed pt  to include ROM /coordination exercises using hand based skateboard, & cone stacking reaching across midline in diagoonal patterns.  Pt reports difficulty doing isolated oppositional pinch using rt/lt little fingers on digiflex. See OT exercises for details.   Performance deficits / Impairments: Decreased ROM,Decreased strength,Decreased coordination,Decreased fine motor control,Decreased high-level IADLs,Decreased sensation    Post-Treatment Pain Level: 6    Prognosis: Good    Activity Tolerance: Patient tolerated treatment well                    TREATMENT PLAN   REQUIRES OT FOLLOW-UP: Yes  Type: Outpatient  Plan  Plan Frequency: 2-3x/week  Plan Weeks: 18 visits  Current Treatment Recommendations: Strengthening,ROM,Patient/Caregiver education & training,Coordination training,Positioning  Plan Comment: continue OT       Therapy Time  Individual Time In:  10:47  Individual Time Out:  11:30  Minutes:  43   Time code minutes : 43       Electronically signed by Junior Lion OT  on 6/6/2022 at 11:04 AM       Treatment Charges: Mins Units Time In/Out   [] Evaluation       []  Low       []  Moderate       []  High      []  Electrical Stim      []  Iontophoresis      []  Paraffin      []  Ultrasound        []  Masage      []  Neuromuscular Re-ed      []  ADL      [x]  Ther Exercise 43 3    []  Ther Activities      []  Neuro Re-Ed      []  Splinting      []  Other      Total Treatment time 43 min 3          POC NOTE

## 2022-06-03 ENCOUNTER — HOSPITAL ENCOUNTER (OUTPATIENT)
Dept: PHYSICAL THERAPY | Age: 62
Setting detail: THERAPIES SERIES
Discharge: HOME | End: 2022-06-03
Payer: MEDICARE

## 2022-06-03 ENCOUNTER — HOSPITAL ENCOUNTER (OUTPATIENT)
Dept: OCCUPATIONAL THERAPY | Age: 62
Setting detail: THERAPIES SERIES
Discharge: HOME | End: 2022-06-03
Payer: MEDICARE

## 2022-06-03 ENCOUNTER — APPOINTMENT (OUTPATIENT)
Dept: PHYSICAL THERAPY | Age: 62
End: 2022-06-03
Payer: MEDICARE

## 2022-06-03 PROCEDURE — 97110 THERAPEUTIC EXERCISES: CPT

## 2022-06-03 NOTE — PROGRESS NOTES
[facility]   OCCUPATIONAL THERAPY MISSED TREATMENT NOTE   OUTPATIENT   Date: 6/3/22  Patient Name: Shelbi José       MRN: [id number]   Account #: [de-identified]    : 1960  (62 y.o.)  Gender: female   Diagnosis: spinal cord injury , cervical region sequela (S14.109S), incomplete quadriplegia at C5-6 level (G82.54), cauda equina syndrome ( G83.4) - ICD-10 codes  OT Visit Information  OT Insurance Information: Medicare  Total # of Visits Approved: 18  Canceled Appointment: 1     REASON FOR MISSED TREATMENT:  Pt called and cancel OT this a.m because she is not moving too well.          Nelson Owen, OT

## 2022-06-03 NOTE — FLOWSHEET NOTE
LLE in place           HS stretch at step 30\"x3 6\" x    Step ups Fwd/Lat 10x2 4\", 2# x NO UE support    3 way hip  10x ea Red   NO UE Support; more of a stepping motion    Forced WB on 6\" step 1' R LE  2x30\" L LE   Encouraged light touch; WB L LE more difficult than R LE   TKE  20x  red     Slow high march w/ opposite UE raise 10x ea 2# x    Heel/toe raises  10x2      Semi tandem Stance  2x30\"   NO UE Support   EC Foam feet close standing balance 2x30\"   x NO UE Support   EO Foam feet close standing balance 2x30''  x NO UE support   Feet together Eye Closed Firm surface 2x30\"    NO UE Support- VERY difficult   Amb in // Bars Fwd/Retro/Side 3 laps   Light use of B UEs ; emphasis on equal step length/heigth, encouraged less visual reliance; L LE ataxia noted   Ambulation in gym  150ft      Toe taps to half dome 10x ea 2# x Fwd/lat with LLE only, NO UE support   Other:    Specific Instructions for next treatment: Progress with LE strengthening, stability and coordination work; challenge patient to decrease visual reliance for increased proprioceptive input. Use ankle weight to increase proprioceptive input     Assessment:    [x] Progressing toward goals. 6/3: Initiated session with nustep for joint mobility and tissue extensibility prior to standing exercise. Focused today's session on standing balance and LE strengthening. Addition of ankle weights for progression of strengthening with pt demonstrating good tolerance. Added toe taps to half domes for LE coordination without UE support, CGA at all times. Pt required Miryam once throughout entire session due to anterior LOB. Short seated rest breaks given between exercises due to muscle fatigue. Pt appropriately fatigued at end of session. [] No change. [] Other:    [x] Patient would continue to benefit from skilled physical therapy services in order to: improve strength,mobiltiy and motor control that impacts balance and mobility.      STG: (to be met in 10 treatments)  1. Pt will improve score on ESCOBEDO balance scale by >/=6 points to improve overall balance stability and decrease fall risk with mobility. 2. Pt will demonstrate >10 SLS stability with light UE support to safely allow pt to enter/exit her tub shower. 3. Pt will improve time on TUG to <20s with LRAD at mod IND level to demonstrate decreased fall risk. 4. Pt will increase gait speed to >0.8 m/s  With LRAD at mod I level to demonstrate decreased fall risk and optimize mobility to at community level. 5. Pt will be able to manage her walker up 1 step without instability to improve independence with access to her home. 6.  Pt will improve time on 5xSTS to <15 seconds with use of 1 UE to decrease fall risk and increase functional capacity for mobility. LTG: (to be met in 20 treatments)  1. Pt will improve score on ESCOBEDO balance scale by >/= 45/56 points  to improve overall balance stability and decrease fall risk with mobility. 2.  Pt will increase strength of all major muscle groups of the LEs to 4/5 or greater demonstrating improved strength needed to maximize safety and efficiency with mobility tasks such as gait, transfers and stairs. 3. Pt will be independent with home program addressing strength, flexibility and balance to maximize gains made in therapy to improve overall functional capacity for mobility. 4. Pt will improve score on OPTIMAL to 46/90 to demonstrate functional improvements with ADLs. 5. Pt will report no falls for x6 weeks demonstrating improved safety with mobility and implementation of fall prevention strategies. * further goals may be established based on additional assessments or progress*     Patient stated Goals: to walk without a walker, improve balance     Pt. Education:  [x] Yes  [] No  [x] Reviewed Prior HEP/Ed  Method of Education: [x] Verbal  [x] Demo  [] Written  Comprehension of Education:  [x] Verbalizes understanding. [] Demonstrates understanding.   [] Needs review. [] Demonstrates/verbalizes HEP/Ed previously given. Plan: [x] Continue per plan of care.    [] Other:      Treatment Charges: Mins Units   []  Modalities     [x]  Ther Exercise 42 3   []  Manual Therapy     []  Ther Activities     []  Aquatics     []  Neuromuscular     [] Vasocompression     [] Gait Training     [] Dry needling        [] 1 or 2 muscles        [] 3 or more muscles     []  Other     Total Treatment time 42 3   * pay attention to KX modifier **     Time In: 3:18pm         Time Out:  4:00pm    Electronically signed by:  Gagandeep Silverio PTA

## 2022-06-06 ENCOUNTER — HOSPITAL ENCOUNTER (OUTPATIENT)
Dept: OCCUPATIONAL THERAPY | Age: 62
Setting detail: THERAPIES SERIES
Discharge: HOME | End: 2022-06-06
Payer: MEDICARE

## 2022-06-06 ENCOUNTER — HOSPITAL ENCOUNTER (OUTPATIENT)
Dept: PHYSICAL THERAPY | Age: 62
Setting detail: THERAPIES SERIES
Discharge: HOME | End: 2022-06-06
Payer: MEDICARE

## 2022-06-06 PROCEDURE — 97110 THERAPEUTIC EXERCISES: CPT

## 2022-06-06 PROCEDURE — 97112 NEUROMUSCULAR REEDUCATION: CPT

## 2022-06-06 ASSESSMENT — PAIN SCALES - GENERAL: PAINLEVEL_OUTOF10: 6

## 2022-06-06 ASSESSMENT — PAIN DESCRIPTION - FREQUENCY: FREQUENCY: CONTINUOUS

## 2022-06-06 ASSESSMENT — PAIN DESCRIPTION - PAIN TYPE: TYPE: ACUTE PAIN

## 2022-06-06 NOTE — PROGRESS NOTES
[facility] [address] [place] [address]. [address]         Phone: [phone]       Fax: [phone]     Occupational Therapy Daily Treatment note     Occupational Therapy: Daily Note   Patient: Dominique Bartlett (59 y.o. female)   Examination Date: 2022  No data recorded  Progress Note Counter: Eval  & tx 2-3 x/week for 6 wk. MD appt 2022     :  1960 # of Visits since [facility]:   6   MRN: [id number]  CSN: [id number] Start of Care Date:    Insurance: Payor: MEDICARE / Plan: MEDICARE PART A AND B / Product Type: *No Product type* /   Insurance ID: [id number] - (Medicare) Secondary Insurance (if applicable): Insurance Information: Medicare   Referring Physician: MD Dr Marquise Hodge   PCP: Kimberly Reyes MD Visits to Date/Visits Approved:     No Show/Cancelled Appts:   /       Medical Diagnosis: Unspecified injury at unspecified level of cervical spinal cord, sequela [S14.109S]  Quadriplegia, C5-C7 incomplete [G82.54]  Cauda equina syndrome [G83.4] spinal cord injury , cervical region sequela (S14.109S), incomplete quadriplegia at C5-6 level (G82.54), cauda equina syndrome ( G83.4) - ICD-10 codes  Treatment Diagnosis: bilateral UE weakness & impaired coordination, bilateral shoulder stiffness, impaired sensation - hands lt more than rt  (M62.81, R27.9, M25.611, M25.612,R20.2        SUBJECTIVE EXAMINATION   Pain Level: Pain Screening  Patient Currently in Pain: Yes  Pain Assessment: 0-10  Pain Level: 6  Patient's Stated Pain Goal: 0 - No pain  Pain Type: Acute pain  Pain Location: Arm,Leg,Neck,Generalized  Pain Descriptors: Aching,Tightness,Pins and needles  Pain Frequency: Continuous    Patient Comments: Subjective: Pt reports being sore all over. Pt states that she hopes when she sees MD that she can stop wearing ASPEN collar.     HEP Compliance: Good        OBJECTIVE EXAMINATION   Restrictions: Restrictions/Precautions: Fall Risk; General Precautions         Exercises:     Treatment Reasoning    OT Exercise 1: PROM B UE,shoulders, wrist/hand  OT Exercise 4: Yellow digiflex 25x gripping bilaeral hands, then each finger oppositional pinch 25x, B hands  OT Exercise 6: hand based skate board  15x each way using rt hand, using lt hand : forward/back, side to side, circles cw & cccw  OT Exercise 7: cone  unstacking/stacking 5 cones 2 sets each way  on table: reaching at diagonal using lt hand, reaching at diagonal  using rt hand  OT Exercise 8: velcro board, remove 32 pieces left and place to right then place back on board, remove 32 pieces right and place to left then place back on board  OT Exercise 12: bilateral wrist strengthening (wrist on ramps ) 1# 20x each way rt/lt ( flex/extension palm down, flex/extension palm up, RD/UD)  OT Exercise 13: orange power web (light resistance) gripping bilateral hands 25x  OT Exercise 14: Purple rom hoop ht 22\" - using rt hand move rings over & back stabilizing board with lt hand, using lt hand move rings over & back stabilizing board with rt hand     Limitations addressed: Strength,Coordination,Flexibility (ROM)   Therapist provided: Verbal cuing   Progressed: Progressed to rom hoop for shoulder rom /coordination, power web to improve hand strength. ASSESSMENT     Assessment: Assessment: Pt states the digiflex is still difficult because her fingers don't work like they should. OT tx focus on bilateral UE PROM (shoulder,wrist,hand) to decrease tightness, AROM, coordination, & hand strengthening. Progressed pt to rom hoop to improve UE gross motor coordination, & power web to improve hand strength. Pt states she felt her muscles moving rings on rom hoop. See OT exercises for details.   Performance deficits / Impairments: Decreased ROM,Decreased strength,Decreased coordination,Decreased fine motor control,Decreased high-level IADLs,Decreased sensation    Post-Treatment Pain Level:      Prognosis: Good    Activity Tolerance: Patient tolerated treatment well                    TREATMENT PLAN   REQUIRES OT FOLLOW-UP: Yes  Type: Outpatient  Plan  Plan Frequency: 2-3x/week  Plan Weeks: 18 visits  Current Treatment Recommendations: Strengthening,ROM,Patient/Caregiver education & training,Coordination training,Positioning  Plan Comment: continue OT       Therapy Time  Individual Time In: 1034  Individual Time Out: 4766  Minutes: 43  Timed Code Treatment Minutes: 43 Minutes       Electronically signed by Henry Mancera OT  on 6/6/2022 at 2:28 PM       Treatment Charges: Mins Units Time In/Out   [] Evaluation       []  Low       []  Moderate       []  High      []  Electrical Stim      []  Iontophoresis      []  Paraffin      []  Ultrasound        []  Masage      []  Neuromuscular Re-ed      []  ADL      [x]  Ther Exercise 43 3    []  Ther Activities      []  Neuro Re-Ed      []  Splinting      []  Other      Total Treatment time 43 min 3          POC NOTE

## 2022-06-06 NOTE — FLOWSHEET NOTE
[address] [place] [address] Outpatient Physical Therapy   [id number] [facility] [address]   Phone: [phone]   Fax: [phone]    Physical Therapy Daily Treatment Note      Date:  2022  Patient Name:  Moss Crigler    :  1960  MRN: [id number]  Physician: Leah Boss MD                            Insurance: Medicare - based on [place]   Medical Diagnosis:   M54.911W (ICD-10-CM) - Spinal cord injury, cervical region, sequela ([facility] [address]   G82.54 (ICD-10-CM) - Incomplete quadriplegia at C5-6 level ([facility] [address]   G83.4 (ICD-10-CM) - Cauda equina syndrome ([facility]   Rehab Codes: R25 pain , R53.1 weakness, Z74.0 reduced mobility, R29.6 high fall risk, R27.8 lack of coordination, R26.89 gait abnormality   Date of symptom onset: 21 -- cauda equina; 22 Cervical surgeries   Next 's appt. : 22 with Neurosurgery; 22 with PMR Dr. Rochelle Horne   Total Visits:   Cx/Ns:0/0   PN needed by visit 10     Precautions: c-collar must be worn due to s/p C2-C7 fixation; not lifting greater than a gallon of milk: Left AFO      Subjective:    LE's are always feeling heavy; N/T is constant in both LE's and UE's but does reports (B) hands/UEs feel stronger.      Pain:  [x] Yes  [] No Location: Neck down to (B) Shoulders/UEs to knuckles in hands (B) LE's int feet and toes  Pain Rating: (0-10 scale) 6/10  Pain altered Tx:  [x] No  [] Yes  Action:  Comments:     Objective:  Modalities:      Exercises:  Exercise Reps/ Time Weight/ Level Completed  Today Comments   nustep  5' Level 3 x B UE/LE           MAT LEVEL     Elevated with large wedge and 2 pillows    SKTC  2x30s    L ONLY    HS stretch  2x30s ea      Hip ER stretch  2x30s    L Only   glute bridges  2x10      hooklying clamshells  2x10 Red      Sitting EOM LAQ 2x10 ea red  Seated EOM without back support for core strengthening           Standing       Sit Stand Staggered stance   3x5 ea    From raised mat table; some assist for keeping LLE in place           HS stretch at step 30\"x3 6\"     Step ups Fwd/Lat 10x2 4\", 2#  NO UE support    3 way hip  10x ea 1.5# L LE  X NO UE Support; more of a stepping motion    Slow Controlled Marches 2-3\" to raise and lower 10x 1.5# L LE x    Forced WB on 4\" step + 1/2 ball 2x30\" each  X Encouraged light touch; WB L LE more difficult than R LE   TKE  20x  red     Slow high march w/ opposite UE raise 10x ea 1.5# L LE x    Heel/toe raises  10x2      Semi tandem Stance  2x30\"  x NO UE Support   EC Foam feet Normal Stance 2x30\"   X NO UE Support   EO Foam Feet touching 2x30''  X NO UE support   Feet together Eye Closed Firm surface 2x30\"    NO UE Support- VERY difficult   Amb in // Bars Fwd/Retro/Side 3 laps 1.5# L LE X Little to no use of UEs ; emphasis on equal step length/heigth, encouraged less visual reliance; L LE ataxia noted   Ambulation in gym with ' 1.5# L LE X    Alternating Toe taps 4\" step + 1/2 ball 10x ea 1.5# L LE x    Other:    Specific Instructions for next treatment: Continue to challenge LE strengthening, stability and coordination work; challenge patient to decrease visual reliance for increased proprioceptive input. Assessment:    [x] Progressing toward goals. Patient requests beginning session with nustep to assist with mobility/loosening up joints. Emphasis on (L) heel strike during gait due to patient tendency to land on forefoot/toes during gait despite use of (L) AFO and verbal cues for heel strike, toe off. Utilized 1.5# ankle weight to L LE this date to assist with proprioceptive input during exercise and gait training with and without rolling walker. Emphasis on standing balance without UE support both static and dynamic; continued difficulty noted with visual occlusion. Emphasis on controlled movements of (B) LE's during exercise. Close CGA required throughout program with occasional Min A as patient fatigued.  Notable fatigue throughout program- continued patient education on appropriate fatigue levels vs over working. Patient demonstrated total body compensation to perform toe tap activity when trying to elevate L LE- required brief seated rest break for muscle recovery and was able to perform the exercise with CGA. Continues to demonstrate ataxia with (B) LE's L>R- increased somatosensory input noted with gait with use of left ankle weight    [] No change. [] Other:    [x] Patient would continue to benefit from skilled physical therapy services in order to: improve strength,mobiltiy and motor control that impacts balance and mobility. STG: (to be met in 10 treatments)  1. Pt will improve score on ESCOBEDO balance scale by >/=6 points to improve overall balance stability and decrease fall risk with mobility. 2. Pt will demonstrate >10 SLS stability with light UE support to safely allow pt to enter/exit her tub shower. 3. Pt will improve time on TUG to <20s with LRAD at mod IND level to demonstrate decreased fall risk. 4. Pt will increase gait speed to >0.8 m/s  With LRAD at mod I level to demonstrate decreased fall risk and optimize mobility to at community level. 5. Pt will be able to manage her walker up 1 step without instability to improve independence with access to her home. 6.  Pt will improve time on 5xSTS to <15 seconds with use of 1 UE to decrease fall risk and increase functional capacity for mobility. LTG: (to be met in 20 treatments)  1. Pt will improve score on ESCOBEDO balance scale by >/= 45/56 points  to improve overall balance stability and decrease fall risk with mobility. 2.  Pt will increase strength of all major muscle groups of the LEs to 4/5 or greater demonstrating improved strength needed to maximize safety and efficiency with mobility tasks such as gait, transfers and stairs. 3. Pt will be independent with home program addressing strength, flexibility and balance to maximize gains made in therapy to improve overall functional capacity for mobility.     4. Pt will improve score on OPTIMAL to 46/90 to demonstrate functional improvements with ADLs. 5. Pt will report no falls for x6 weeks demonstrating improved safety with mobility and implementation of fall prevention strategies. * further goals may be established based on additional assessments or progress*     Patient stated Goals: to walk without a walker, improve balance     Pt. Education:  [x] Yes  [] No  [x] Reviewed Prior HEP/Ed  Method of Education: [x] Verbal  [x] Demo  [] Written  Comprehension of Education:  [x] Verbalizes understanding. [] Demonstrates understanding. [] Needs review. [] Demonstrates/verbalizes HEP/Ed previously given. Plan: [x] Continue per plan of care.    [] Other:      Treatment Charges: Mins Units   []  Modalities     []  Ther Exercise     []  Manual Therapy     []  Ther Activities     []  Aquatics     [x]  Neuromuscular 38 3   [] Vasocompression     [] Gait Training     [] Dry needling        [] 1 or 2 muscles        [] 3 or more muscles     []  Other     Total Treatment time 38 3    ** KX and 59 Modifiers added**     Time In: 1118 AM        Time Out:  1200 PM    Electronically signed by:  Charm Curling, PTA

## 2022-06-08 ENCOUNTER — OFFICE VISIT (OUTPATIENT)
Dept: NEUROSURGERY | Age: 62
End: 2022-06-08

## 2022-06-08 VITALS
BODY MASS INDEX: 26.43 KG/M2 | WEIGHT: 140 LBS | HEART RATE: 93 BPM | SYSTOLIC BLOOD PRESSURE: 131 MMHG | HEIGHT: 61 IN | TEMPERATURE: 97.9 F | DIASTOLIC BLOOD PRESSURE: 81 MMHG

## 2022-06-08 DIAGNOSIS — G81.14 LEFT SPASTIC HEMIPLEGIA (HCC): ICD-10-CM

## 2022-06-08 DIAGNOSIS — M43.8X2 SWAN NECK DEFORMITY OF CERVICAL SPINE: Primary | ICD-10-CM

## 2022-06-08 DIAGNOSIS — M48.062 LUMBAR STENOSIS WITH NEUROGENIC CLAUDICATION: ICD-10-CM

## 2022-06-08 PROCEDURE — 99024 POSTOP FOLLOW-UP VISIT: CPT | Performed by: NEUROLOGICAL SURGERY

## 2022-06-08 RX ORDER — HYDROCODONE BITARTRATE AND ACETAMINOPHEN 10; 325 MG/1; MG/1
1 TABLET ORAL EVERY 6 HOURS PRN
COMMUNITY

## 2022-06-08 NOTE — PROGRESS NOTES
[address]  [facility] [address] [facility] [address]  Dept: [phone]    Patient:  Villa Lyon  YOB: 1960  Date: 6/8/22    The patient is a 62 y.o. female who presents today for consult of the following problems:     Chief Complaint   Patient presents with    Post-Op Check             HPI:     Villa Lyon is a 62 y.o. female on whom neurosurgical consultation was requested by Les Paez MD for management of cervical deformity and cervical myelopathy status post decompression and fixation of her kyphosis. Patient has significant improved in terms of upper extremity function dexterity numbness tingling ambulatory ability and independence. Still with some numbness and tingling of the fingertips as well as the left foot. Overall she has noticed a decline in her cervical posture with forward translation of her neck slowly over time despite the use of the collar. .      History:     Past Medical History:   Diagnosis Date    Anxiety     Arthritis     At maximum risk for fall 12/29/2021    caudal equina syndrome and cervical stenosis    Cancer (HCC)     right breast    Cauda equina syndrome ([facility] [address] 12/29/2021    neuropathy, mobility difficulty, incontinance    Cervical stenosis of spine 12/29/2021    GERD (gastroesophageal reflux disease)     History of stomach ulcers     Hypertension     Dr. Lino Foster    Hypothyroidism     Lumbar neuritis     Post laminectomy syndrome     Spinal deformity 11/2021    cervical and lumbar    Thyroid disease     Uses wheelchair     Wears dentures     Wellness examination     Dr. Lino Foster     Past Surgical History:   Procedure Laterality Date    BACK SURGERY      lower back x 3, cervical- x 1: C4- C6, 11/4/2014     BREAST SURGERY Right     mastectomy with reconstruction    CERVICAL FUSION N/A 4/12/2022    C5 CORPECTOMY, USE OF INTRAOPERATIVE CERVICAL TRACTION performed by Greg Reddy DO at [address] [place] [address] N/A 4/12/2022    POSTERIOR FIXATION C2-C7 performed by Greg Reddy DO at [address] [place] [address]  about 2018    HERNIA REPAIR Bilateral     inguinal    HYSTERECTOMY, TOTAL ABDOMINAL (CERVIX REMOVED)      LUMBAR SPINE SURGERY N/A 12/30/2021    LUMBAR LAMINECTOMY L2-S1 performed by Greg [facility] DO at [facility]      OTHER SURGICAL HISTORY  04/12/2022    C5 CORPECTOMY, USE OF INTRAOPERATIVE CERVICAL TRACTION (N/A Spine Cervical)      Family History   Problem Relation Age of Onset    Hypertension Mother     Heart Disease Mother     Cancer Mother      Current Outpatient Medications on File Prior to Visit   Medication Sig Dispense Refill    HYDROcodone-acetaminophen (NORCO)  MG per tablet Take 1 tablet by mouth every 6 hours as needed for Pain.  lisinopril (PRINIVIL;ZESTRIL) 5 MG tablet Take 1 tablet by mouth daily 30 tablet 3    dilTIAZem (CARDIZEM CD) 180 MG extended release capsule Take 1 capsule by mouth daily 30 capsule 3    fluticasone (FLONASE) 50 MCG/ACT nasal spray 1 spray by Each Nostril route daily 16 g 3    docusate sodium (COLACE) 100 MG capsule Take 2 capsules by mouth 2 times daily 60 capsule 1    baclofen (LIORESAL) 10 MG tablet Take 1 tablet by mouth 4 times daily 120 tablet 1    levothyroxine (SYNTHROID) 100 MCG tablet Take 1 tablet by mouth Daily 30 tablet 3    pantoprazole (PROTONIX) 40 MG tablet Take 1 tablet by mouth daily 30 tablet 3    ferrous sulfate (IRON 325) 325 (65 Fe) MG tablet Take 1 tablet by mouth 2 times daily (with meals) 30 tablet 3    busPIRone (BUSPAR) 30 MG tablet Take 30 mg by mouth 2 times daily 60 tablet 3    Vitamin D, Ergocalciferol, 50 MCG (2000 UT) CAPS TAKE ONE CAPSULE BY MOUTH DAILY 90 capsule 3    methadone (DOLOPHINE) 10 MG tablet Take 10 mg by mouth 2 times daily.        No current facility-administered medications on file prior to visit. Social History     Tobacco Use    Smoking status: Former Smoker     Packs/day: 0.50     Years: 30.00     Pack years: 15.00     Quit date:      Years since quittin.4    Smokeless tobacco: Never Used   Vaping Use    Vaping Use: Every day    Substances: Nicotine   Substance Use Topics    Alcohol use: Yes     Alcohol/week: 0.0 standard drinks     Comment: weekend at times    Drug use: Yes     Types: Prescription     Comment: Methadone from [facility]       Allergies   Allergen Reactions    Latex Hives, Itching, Dermatitis and Rash    Kiwi Extract      Face swelling, some difficulty breathing       Review of Systems  ROS: Numbness. Pain. Physical Exam:      /81 (Site: Left Upper Arm, Position: Sitting, Cuff Size: Medium Adult)   Pulse 93   Temp 97.9 °F (36.6 °C)   Ht 5' 1\" (1.549 m)   Wt 140 lb (63.5 kg)   BMI 26.45 kg/m²   Estimated body mass index is 26.45 kg/m² as calculated from the following:    Height as of this encounter: 5' 1\" (1.549 m). Weight as of this encounter: 140 lb (63.5 kg). General:  Mary Meng is a 62y.o. year old female who appears her stated age. HEENT: Normocephalic atraumatic. Neck supple. Chest: regular rate; pulses equal. Equal chest rise and fall  Abdomen: Soft nondistended. Ext: DP equal with good capillary refill  Neuro    Mentation  Appropriate affect   oriented    Cranial Nerves:   Pupils equal and reactive to light  Extraocular motion intact  Face symmetric  No dysarthria  v1-3 sensation symmetric, masseter tone symmetric  Hearing symmetric and intact to finger rub    Sensation:   Sensation distal left lower extremity and upper extremity distal.    Motor  L deltoid 5/5; R deltoid 5/5  L biceps 5/5; R biceps 5/5  L triceps 5/5; R triceps 5/5  L wrist extension 5/5; R wrist extension 5/5  L intrinsics 5/5; R intrinsics 5/5     Left flexion dorsiflexion 4 -. Left  and intrinsics 4 -.   Otherwise 5 out of 5    Reflexes  L Brachioradialis 2+/4; R brachioradialis 2+/4  L Biceps 2+/4; R Biceps 2+/4  L Triceps 2+/4; R Triceps 2+/4  L Patellar 2+/4: R Patellar 2+/4  L Achilles 2+/4; R Achilles 2+/4      Studies Review: For upright x-rays show good placement of all hardware however there is a significant progressive forward translation with suspected kyphosis at the cervicothoracic junction    Assessment and Plan:      1. [place] neck deformity of cervical spine          Plan: We will obtain a CT of the cervical spine extending down into the upper thoracic spine to evaluate for any progressive kyphosis at the cervical thoracic junction that would explain her translation. Followup: No follow-ups on file. Prescriptions Ordered:  No orders of the defined types were placed in this encounter. Orders Placed:  Orders Placed This Encounter   Procedures    CT CERVICAL SPINE WO CONTRAST     Standing Status:   Future     Standing Expiration Date:   6/8/2023     Order Specific Question:   Reason for exam:     Answer:   please evaluate down to T3    CT THORACIC SPINE WO CONTRAST     Standing Status:   Future     Standing Expiration Date:   6/8/2023     Order Specific Question:   Reason for exam:     Answer:   evaluate cervicothoracic junction for deformity        Electronically signed by Reinaldo Duenas DO on 6/8/2022 at 11:48 AM    Please note that this chart was generated using voice recognition Dragon dictation software. Although every effort was made to ensure the accuracy of this automated transcription, some errors in transcription may have occurred.

## 2022-06-10 ENCOUNTER — HOSPITAL ENCOUNTER (OUTPATIENT)
Dept: OCCUPATIONAL THERAPY | Age: 62
Setting detail: THERAPIES SERIES
Discharge: HOME | End: 2022-06-10
Payer: MEDICARE

## 2022-06-10 ENCOUNTER — HOSPITAL ENCOUNTER (OUTPATIENT)
Dept: PHYSICAL THERAPY | Age: 62
Setting detail: THERAPIES SERIES
Discharge: HOME | End: 2022-06-10
Payer: MEDICARE

## 2022-06-10 PROCEDURE — 97112 NEUROMUSCULAR REEDUCATION: CPT

## 2022-06-10 PROCEDURE — 97110 THERAPEUTIC EXERCISES: CPT

## 2022-06-10 ASSESSMENT — PAIN DESCRIPTION - ORIENTATION: ORIENTATION: LEFT

## 2022-06-10 ASSESSMENT — PAIN DESCRIPTION - FREQUENCY: FREQUENCY: CONTINUOUS

## 2022-06-10 ASSESSMENT — PAIN SCALES - GENERAL: PAINLEVEL_OUTOF10: 6

## 2022-06-10 ASSESSMENT — PAIN DESCRIPTION - PAIN TYPE: TYPE: ACUTE PAIN

## 2022-06-10 ASSESSMENT — PAIN DESCRIPTION - LOCATION: LOCATION: ARM;NECK;LEG

## 2022-06-10 NOTE — PROGRESS NOTES
[facility] [address] [place] [address]. [address]         Phone: [phone]       Fax: [phone]     Occupational Therapy Daily Treatment note     Occupational Therapy: Daily Note   Patient: Malik Martin (65 y.o. female)   Examination Date: 06/10/2022  No data recorded  Progress Note Counter: Eval  & tx 2-3 x/week for 6 wk. MD appt 2022     :  1960 # of Visits since [facility]:   7   MRN: [id number]  CSN: [id number] Start of Care Date:    Insurance: Payor: MEDICARE / Plan: MEDICARE PART A AND B / Product Type: *No Product type* /   Insurance ID: [id number] - (Medicare) Secondary Insurance (if applicable): Insurance Information: Medicare   Referring Physician: MD Dr Chhaya Zaragoza   PCP: Teresa Mcdaniel MD Visits to Date/Visits Approved:     No Show/Cancelled Appts:   /       Medical Diagnosis: Unspecified injury at unspecified level of cervical spinal cord, sequela [S14.109S]  Quadriplegia, C5-C7 incomplete [G82.54]  Cauda equina syndrome [G83.4] spinal cord injury , cervical region sequela (S14.109S), incomplete quadriplegia at C5-6 level (G82.54), cauda equina syndrome ( G83.4) - ICD-10 codes  Treatment Diagnosis: bilateral UE weakness & impaired coordination, bilateral shoulder stiffness, impaired sensation - hands lt more than rt  (M62.81, R27.9, M25.611, M25.612,R20.2        SUBJECTIVE EXAMINATION   Pain Level: Pain Screening  Patient Currently in Pain: Yes  Pain Assessment: 0-10  Pain Level: 6  Post Treatment Pain Level: 6  Patient's Stated Pain Goal: 0 - No pain  Pain Type: Acute pain  Pain Location: Arm,Neck,Leg  Pain Orientation: Left (lt arm , both legs)  Pain Radiating Towards: Pain radiate neck  to legs. Pain Descriptors: Aching,Tightness,Pins and needles  Pain Frequency: Continuous    Patient Comments: Subjective: Pt states she saw MD Julieta Benton and she needs to set up CT scan. Pt needs to keep wearing ASPEN collar. HEP Compliance: Good  Comment: Pt wearing ASPEN collar. OBJECTIVE EXAMINATION   Restrictions: Restrictions/Precautions: Fall Risk; General Precautions        TREATMENT     Exercises:     Treatment Reasoning    OT Exercise 1: PROM B UE,shoulders, wrist/hand, finger blocking exercises bilateral hands PIP flex/extension 25x  OT Exercise 2: AROM 15x each: B UE shoulder flexion/extension at 90 degrees, shoulder abduction/adduction at 90 degrees, forearm supination/pronation, arms extended pt complete wrist flexion/extension  OT Exercise 4: Yellow digiflex 25x gripping bilaeral hands, then each finger oppositional pinch 25x, B hands - lay digiflex on table to do little finger pinching  OT Exercise 6: hand based skate board  15x each way using rt hand, using lt hand : forward/back, side to side, circles cw & cccw  OT Exercise 8: velcro board, remove 32 pieces left and place to right then place back on board, remove 32 pieces right and place to left then place back on board  OT Exercise 13: orange power web (light resistance) gripping bilateral hands 25x, push for hand/wrist extension  rt/lt 25x  OT Exercise 15: yellow therapy ball bilateral UE exercises 15x each : roll ball on table (forward/back, side to side, circles cw & ccw), raise ball for shoulder flex/extension. OT Exercise 16: press down cone in putty: 10x each way for rt hand/lt hand  (holding small end cone, holding large end cone)     Limitations addressed: Strength,Coordination,Flexibility   Therapist provided: Verbal cuing   Progressed: Begun bilateral UE  yellow therapy ball exercises for rom & stretching, pressing cone in putty for bilateral UE strengthening. ASSESSMENT     Assessment: Assessment: Pt reports & demo difficulty extending lt index & abducting lt little finger.  OT tx includes bilateral UE PROM, tendon gliding exercises bilateral hands to decrease muscle tightness, AROM,coordination, & strengthening exercises. Progressed pt to UE therapy ball exercises for motion/flexibility,stretching, & cone in putty exercises to improve bilateral UE strength. Pt states that her arm muscles feels like they got a good work out. Pain the same at end tx. See OT exercises for details. Cues for pt to take rest break when arms fatigue.   Performance deficits / Impairments: Decreased ROM,Decreased strength,Decreased coordination,Decreased fine motor control,Decreased high-level IADLs,Decreased sensation    Post-Treatment Pain Level: 6    Prognosis: Good    Activity Tolerance: Patient tolerated treatment well                    TREATMENT PLAN   REQUIRES OT FOLLOW-UP: Yes  Type: Outpatient  Plan  Plan Frequency: 2-3x/week  Plan Weeks: 18 visits  Current Treatment Recommendations: Strengthening,ROM,Patient/Caregiver education & training,Coordination training,Positioning  Plan Comment: continue OT       Therapy Time  Individual Time In: 1159  Individual Time Out: 7735  Minutes: 45  Timed Code Treatment Minutes: 45 Minutes       Electronically signed by Ricki Aden OT  on 6/10/2022 at 12:26 PM       Treatment Charges: Mins Units Time In/Out   [] Evaluation       []  Low       []  Moderate       []  High      []  Electrical Stim      []  Iontophoresis      []  Paraffin      []  Ultrasound        []  Masage      []  Neuromuscular Re-ed      []  ADL      [x]  Ther Exercise 45 3    []  Ther Activities      []  Neuro Re-Ed      []  Splinting      []  Other      Total Treatment time 45 min 3          POC NOTE

## 2022-06-10 NOTE — FLOWSHEET NOTE
[address] [facility]   [address]   Phone: [phone]   Fax: [phone]    Physical Therapy Daily Treatment Note      Date:  6/10/2022  Patient Name:  Mary Meng    :  1960  MRN: [id number]  Physician: Rupali Bailey MD                            Insurance: Medicare - based on [place]   Medical Diagnosis:   D74.416Q (ICD-10-CM) - Spinal cord injury, cervical region, sequela ([facility]   G82.54 (ICD-10-CM) - Incomplete quadriplegia at C5-6 level ([facility]   G83.4 (ICD-10-CM) - Cauda equina syndrome ([facility]   Rehab Codes: R25 pain , R53.1 weakness, Z74.0 reduced mobility, R29.6 high fall risk, R27.8 lack of coordination, R26.89 gait abnormality   Date of symptom onset: 21 -- cauda equina; 22 Cervical surgeries   Next 's appt. : 22 with Neurosurgery; 22 with PMR Dr. Darius Juarez   Total Visits:   Cx/Ns:0/0   PN needed by visit 10     Precautions: c-collar must be worn due to s/p C2-C7 fixation; not lifting greater than a gallon of milk: Left AFO      Subjective:    Patient reports today that she feels more general stiffness. Reported recent increase in muscle spasms in LB.      Pain:  [x] Yes  [] No Location: Neck down to (B) Shoulders/UEs to knuckles in hands (B) LE's int feet and toes  Pain Rating: (0-10 scale) 6/10  Pain altered Tx:  [x] No  [] Yes  Action:  Comments:     Objective:  Modalities:      Exercises:  Exercise Reps/ Time Weight/ Level Completed  Today Comments   nustep  5' Level 3 x B UE/LE           MAT LEVEL     Elevated with large wedge and 2 pillows    SKTC  2x30s    L ONLY    HS stretch  2x30s ea      Hip ER stretch  2x30s    L Only   glute bridges  2x10      hooklying clamshells  2x10 Red      Sitting EOM LAQ 2x10 ea red  Seated EOM without back support for core strengthening           Standing       Sit Stand Staggered stance   3x5 ea    From raised mat table; some assist for keeping LLE in place           HS stretch at step 30\"x3 6\"     Step ups Fwd/Lat 10x2 4\", 2#  NO UE support    3 way hip  10x ea 1.5# L LE  X NO UE Support; more of a stepping motion    Slow Controlled Marches 2-3\" to raise and lower 10x 1.5# L LE x    Forced WB on 4\" step + 1/2 ball 2x30\" each  X Encouraged light touch; WB L LE more difficult than R LE   TKE  20x  red     Slow high march w/ opposite UE raise 10x2 ea 1.5# L LE x    Heel/toe raises  10x2      Semi tandem Stance  2x30\"  x NO UE Support   EC Foam feet Normal Stance 2x30\"   X NO UE Support   EO Foam Feet touching 2x30''  X NO UE support   Feet together Eye Closed Firm surface 2x30\"   x NO UE Support- VERY difficult   Amb in // Bars Fwd/Retro/Side 3 laps 1.5# L LE X Little to no use of UEs ; emphasis on equal step length/heigth, encouraged less visual reliance; L LE ataxia noted   Ambulation in gym with ' 1.5# L LE     Alternating Toe taps 4\" step + 1/2 ball 10x ea 1.5# L LE x    Other:    Specific Instructions for next treatment: Continue to challenge LE strengthening, stability and coordination work; challenge patient to decrease visual reliance for increased proprioceptive input. Assessment:    [x] Progressing toward goals. Continued to challenge proprioception with both unstable surfaces and visual occlusion. Patient with most difficulty with visual occlusion. Notable improvement in gait pattern with // ambulation following cues to encourage equal step length and width. Notable improvement in exercise technique following cues to improve. Patient required 3 brief seated rest breaks during treatment today secondary to fatigue. [] No change. [] Other:    [x] Patient would continue to benefit from skilled physical therapy services in order to: improve strength,mobiltiy and motor control that impacts balance and mobility. STG: (to be met in 10 treatments)  1.   Pt will improve score on ESCOBEDO balance scale by >/=6 points to improve overall balance stability and decrease fall risk with mobility. 2. Pt will demonstrate >10 SLS stability with light UE support to safely allow pt to enter/exit her tub shower. 3. Pt will improve time on TUG to <20s with LRAD at mod IND level to demonstrate decreased fall risk. 4. Pt will increase gait speed to >0.8 m/s  With LRAD at mod I level to demonstrate decreased fall risk and optimize mobility to at community level. 5. Pt will be able to manage her walker up 1 step without instability to improve independence with access to her home. 6.  Pt will improve time on 5xSTS to <15 seconds with use of 1 UE to decrease fall risk and increase functional capacity for mobility. LTG: (to be met in 20 treatments)  1. Pt will improve score on ESCOBEDO balance scale by >/= 45/56 points  to improve overall balance stability and decrease fall risk with mobility. 2.  Pt will increase strength of all major muscle groups of the LEs to 4/5 or greater demonstrating improved strength needed to maximize safety and efficiency with mobility tasks such as gait, transfers and stairs. 3. Pt will be independent with home program addressing strength, flexibility and balance to maximize gains made in therapy to improve overall functional capacity for mobility. 4. Pt will improve score on OPTIMAL to 46/90 to demonstrate functional improvements with ADLs. 5. Pt will report no falls for x6 weeks demonstrating improved safety with mobility and implementation of fall prevention strategies. * further goals may be established based on additional assessments or progress*     Patient stated Goals: to walk without a walker, improve balance     Pt. Education:  [x] Yes  [] No  [x] Reviewed Prior HEP/Ed  Method of Education: [x] Verbal  [x] Demo  [] Written  Comprehension of Education:  [x] Verbalizes understanding. [] Demonstrates understanding. [] Needs review. [] Demonstrates/verbalizes HEP/Ed previously given. Plan: [x] Continue per plan of care.    [] Other:      Treatment Charges: Mins Units   []  Modalities     []  Ther Exercise     []  Manual Therapy     []  Ther Activities     []  Aquatics     [x]  Neuromuscular 39 3   [] Vasocompression     [] Gait Training     [] Dry needling        [] 1 or 2 muscles        [] 3 or more muscles     []  Other     Total Treatment time 39 3    ** KX and 59 Modifiers added**     Time In: 10:44 am        Time Out: 11:28 am      Electronically signed by:  Nasrin Arellano PTA

## 2022-06-13 ENCOUNTER — HOSPITAL ENCOUNTER (OUTPATIENT)
Dept: PHYSICAL THERAPY | Age: 62
Setting detail: THERAPIES SERIES
Discharge: HOME | End: 2022-06-13
Payer: MEDICARE

## 2022-06-13 ENCOUNTER — HOSPITAL ENCOUNTER (OUTPATIENT)
Dept: OCCUPATIONAL THERAPY | Age: 62
Setting detail: THERAPIES SERIES
Discharge: HOME | End: 2022-06-13
Payer: MEDICARE

## 2022-06-13 PROCEDURE — 97110 THERAPEUTIC EXERCISES: CPT

## 2022-06-13 PROCEDURE — 97112 NEUROMUSCULAR REEDUCATION: CPT

## 2022-06-13 ASSESSMENT — PAIN DESCRIPTION - FREQUENCY: FREQUENCY: CONTINUOUS

## 2022-06-13 ASSESSMENT — PAIN DESCRIPTION - PAIN TYPE: TYPE: ACUTE PAIN

## 2022-06-13 ASSESSMENT — PAIN DESCRIPTION - LOCATION: LOCATION: ARM;NECK;LEG

## 2022-06-13 ASSESSMENT — PAIN SCALES - GENERAL: PAINLEVEL_OUTOF10: 5

## 2022-06-13 ASSESSMENT — PAIN DESCRIPTION - ORIENTATION: ORIENTATION: LEFT

## 2022-06-13 NOTE — FLOWSHEET NOTE
[facility]   [address]   Phone: [phone]   Fax: [phone]    Physical Therapy Daily Treatment Note      Date:  2022  Patient Name:  Rochelle Bang    :  1960  MRN: [id number]  Physician: Betzy Rodriguez MD                            Insurance: [place] - [place]   Medical Diagnosis:   X46.071P (ICD-10-CM) - Spinal cord injury, cervical region, sequela ([facility] [address]   G82.54 (ICD-10-CM) - Incomplete quadriplegia at C5-6 level ([facility] [address]   G83.4 (ICD-10-CM) - Cauda equina syndrome ([facility] [address]   Rehab Codes: R25 pain , R53.1 weakness, Z74.0 reduced mobility, R29.6 high fall risk, R27.8 lack of coordination, R26.89 gait abnormality   Date of symptom onset: 21 -- cauda equina; 22 Cervical surgeries   Next 's appt. : 22 with Neurosurgery; 22 with PMR Dr. Alicia Paulino   Total Visits:   Cx/Ns:0/0   PN needed by visit 10     Precautions: C-collar must be worn due to s/p C2-C7 fixation; not lifting greater than a gallon of milk: Left AFO      Subjective:    Patient states she is feeling stiff in her L LE but is very sore from her neck down.      Pain:  [x] Yes  [] No Location: Neck down to (B) Shoulders/UEs to knuckles in hands (B) LE's int feet and toes  Pain Rating: (0-10 scale) 6/10  Pain altered Tx:  [x] No  [] Yes  Action:  Comments:     Objective:  Modalities:      Exercises:  Exercise Reps/ Time Weight/ Level Completed  Today Comments   nustep  5' Level 3 x B UE/LE           MAT LEVEL     Elevated with large wedge and 2 pillows    SKTC  2x30s    L ONLY    HS stretch  2x30s ea      Hip ER stretch  2x30s    L Only   glute bridges  2x10      hooklying clamshells  2x10 Red      Sitting EOM LAQ 2x10 ea red  Seated EOM without back support for core strengthening           Seated        HS stretch 2x30''  x    Glute stretch 3x30''  x LLE only, Assist from PTA          Standing       Sit Stand Staggered stance   3x5 ea    From raised mat table; some assist for keeping LLE in place           HS stretch at step 30\"x3 6\"     Step ups Fwd/Lat 10x2 4\", 2#  NO UE support    3 way hip  10x ea 1.5# L LE  x NO UE Support; more of a stepping motion    Slow Controlled Marches 2-3\" to raise and lower 10x 1.5# L LE     Forced WB on 4\" step + 1/2 ball 2x30\" each  x Encouraged light touch; WB L LE more difficult than R LE   TKE  20x  red     Slow high march w/ opposite UE raise 10x2 ea 1.5# L LE     Heel/toe raises  10x2  x    Semi tandem Stance  2x30\"   NO UE Support   EC Foam feet Normal Stance 2x30\"   x NO UE Support   EO Foam Feet touching 2x30''  x NO UE support   Feet together Eye Closed Firm surface 2x30\"   x NO UE Support- VERY difficult   Amb in // Bars Fwd/Retro/Side 3 laps 1.5# L LE  Little to no use of UEs ; emphasis on equal step length/height, encouraged less visual reliance; L LE ataxia noted   Ambulation in gym with ' 1.5# L LE     Alternating Toe taps 4\" step + 1/2 ball 10x ea 1.5# L LE x    Other:    Specific Instructions for next treatment: Continue to challenge LE strengthening, stability and coordination work; challenge patient to decrease visual reliance for increased proprioceptive input. Assessment:    [x] Progressing toward goals. 6/13: Initiated session with warm up at [facility] to improve tissue extensibility and joint mobility prior to strengthening and stability work. Continued to parallel bars for weight bearing exercises with focus on stability and increasing proprioceptive input. Patient demonstrates increased confidence with the decreased reliance of UE support within parallel bars. Patient continues to have difficulty with eyes closed exercises but is demonstrating slow improvement with decreased postural sway. Added seated glute stretch as pt noted increased tightness with hip flexion. [] No change.      [] Other:    [x] Patient would continue to benefit from skilled physical therapy services in order to: improve strength,mobiltiy and motor control that impacts balance and mobility. STG: (to be met in 10 treatments)  1. Pt will improve score on ESCOBEDO balance scale by >/=6 points to improve overall balance stability and decrease fall risk with mobility. 2. Pt will demonstrate >10 SLS stability with light UE support to safely allow pt to enter/exit her tub shower. 3. Pt will improve time on TUG to <20s with LRAD at mod IND level to demonstrate decreased fall risk. 4. Pt will increase gait speed to >0.8 m/s  With LRAD at mod I level to demonstrate decreased fall risk and optimize mobility to at community level. 5. Pt will be able to manage her walker up 1 step without instability to improve independence with access to her home. 6.  Pt will improve time on 5xSTS to <15 seconds with use of 1 UE to decrease fall risk and increase functional capacity for mobility. LTG: (to be met in 20 treatments)  1. Pt will improve score on ESCOBEDO balance scale by >/= 45/56 points  to improve overall balance stability and decrease fall risk with mobility. 2.  Pt will increase strength of all major muscle groups of the LEs to 4/5 or greater demonstrating improved strength needed to maximize safety and efficiency with mobility tasks such as gait, transfers and stairs. 3. Pt will be independent with home program addressing strength, flexibility and balance to maximize gains made in therapy to improve overall functional capacity for mobility. 4. Pt will improve score on OPTIMAL to 46/90 to demonstrate functional improvements with ADLs. 5. Pt will report no falls for x6 weeks demonstrating improved safety with mobility and implementation of fall prevention strategies. * further goals may be established based on additional assessments or progress*     Patient stated Goals: to walk without a walker, improve balance     Pt.  Education:  [x] Yes  [] No  [x] Reviewed Prior HEP/Ed  Method of Education: [x] Verbal  [x] Demo  [] Written  Comprehension of Education:  [x] Verbalizes understanding. [] Demonstrates understanding. [] Needs review. [] Demonstrates/verbalizes HEP/Ed previously given. Plan: [x] Continue per plan of care.    [] Other:      Treatment Charges: Mins Units   []  Modalities     [x]  Ther Exercise 10 1   []  Manual Therapy     []  Ther Activities     []  Aquatics     [x]  Neuromuscular 30 2   [] Vasocompression     [] Gait Training     [] Dry needling        [] 1 or 2 muscles        [] 3 or more muscles     []  Other     Total Treatment time 40 3    ** KX and 59 Modifiers added**     Time In: 12:15 pm        Time Out: 12:55 pm      Electronically signed by:  Jessica Munoz PTA

## 2022-06-13 NOTE — PROGRESS NOTES
[address]         Phone: [phone]       Fax: [phone]     Occupational Therapy Daily Treatment note     Occupational Therapy: Daily Note   Patient: Darvin An (10 y.o. female)   Examination Date: 2022  No data recorded  Progress Note Counter: Eval  & tx 2-3 x/week for 6 wk. MD appt 2022     :  1960 # of Visits since [facility]:   8   MRN: [id number]  CSN: [id number] Start of Care Date:    Insurance: Payor: MEDICARE / Plan: MEDICARE PART A AND B / Product Type: *No Product type* /   Insurance ID: [id number] - (Medicare) Secondary Insurance (if applicable): Insurance Information: Medicare   Referring Physician: MD Dr Jeffy Collazo   PCP: Cain Curling, MD Visits to Date/Visits Approved:     No Show/Cancelled Appts:   /       Medical Diagnosis: Unspecified injury at unspecified level of cervical spinal cord, sequela [S14.109S]  Quadriplegia, C5-C7 incomplete [G82.54]  Cauda equina syndrome [G83.4] spinal cord injury , cervical region sequela (S14.109S), incomplete quadriplegia at C5-6 level (G82.54), cauda equina syndrome ( G83.4) - ICD-10 codes  Treatment Diagnosis: bilateral UE weakness & impaired coordination, bilateral shoulder stiffness, impaired sensation - hands lt more than rt  (M62.81, R27.9, M25.611, M25.612,R20.2        SUBJECTIVE EXAMINATION   Pain Level: Pain Screening  Patient Currently in Pain: Yes  Pain Level: 5  Patient's Stated Pain Goal: 0 - No pain  Pain Type: Acute pain  Pain Location: Arm,Neck,Leg  Pain Orientation: Left  Pain Descriptors: Aching,Tightness,Pins and needles  Pain Frequency: Continuous    Patient Comments: Subjective: Pt disappointed that the MD \"does not like that my neck is dropping forward\", getting another CT scan, needs to schedule it. HEP Compliance: Good  Comment: Pt wearing ASPEN collar.      OBJECTIVE EXAMINATION   Restrictions: Restrictions/Precautions: Fall Risk; General Precautions          TREATMENT     Exercises:     Treatment Reasoning    OT Exercise 1: PROM B UE,shoulders, wrist/hand, finger blocking exercises bilateral hands PIP flex/extension 25x  OT Exercise 2: AROM 15x each: B UE shoulder flexion/extension at 90 degrees, shoulder abduction/adduction at 90 degrees, forearm supination/pronation, arms extended pt complete wrist flexion/extension  OT Exercise 3: Desensitization exercises with towel B hands, pull in then push out, 10x  OT Exercise 13: orange power web (light resistance) gripping bilateral hands 25x, push for hand/wrist extension  rt/lt 25x  OT Exercise 14: Purple rom hoop ht 22\" - using rt hand move rings over & back stabilizing board with lt hand, using lt hand move rings over & back stabilizing board with rt hand  OT Exercise 16: press down cone in putty: 10x each way for rt hand/lt hand  (holding small end cone, holding large end cone)     Limitations addressed: Strength,Coordination,Flexibility   Therapist provided: Verbal cuing   Progressed: Resistance,Repetitions   Progressed: Isometric exercises left hand, particularly little finger                       Patient Education:         ASSESSMENT     Assessment: Assessment: Tx focus  B UE PROM, AROM, tendon gliding exercises, increasing B UE coordination and strength. Therapeutic exercises completed, graded to pt's tolerance, see OT note for details.   Performance deficits / Impairments: Decreased ROM,Decreased strength,Decreased coordination,Decreased fine motor control,Decreased high-level IADLs,Decreased sensation    Post-Treatment Pain Level:      Prognosis: Good    Activity Tolerance: Patient tolerated treatment well                  TREATMENT PLAN   REQUIRES OT FOLLOW-UP: Yes  Type: Outpatient  Plan  Plan Frequency: 2-3x/week  Plan Weeks: 18 visits  Current Treatment Recommendations: Strengthening,ROM,Patient/Caregiver education & training,Coordination training,Positioning  Plan Comment: continue OT       Therapy Time  Individual Time In: 2059  Individual Time Out: 8568  Minutes: 46  Timed Code Treatment Minutes: 46 Minutes       Electronically signed by SHERLYN Keen  on 6/13/2022 at 6:27 PM       Treatment Charges: Mins Units Time In/Out   [] Evaluation       []  Low       []  Moderate       []  High      []  Electrical Stim      []  Iontophoresis      []  Paraffin      []  Ultrasound        []  Masage      []  Neuromuscular Re-ed      []  ADL      [x]  Ther Exercise 46 3    []  Ther Activities      []  Neuro Re-Ed      []  Splinting      []  Other      Total Treatment time 46 min 3          POC NOTE

## 2022-06-15 ENCOUNTER — HOSPITAL ENCOUNTER (OUTPATIENT)
Dept: PHYSICAL THERAPY | Age: 62
Setting detail: THERAPIES SERIES
Discharge: HOME | End: 2022-06-15
Payer: MEDICARE

## 2022-06-15 ENCOUNTER — HOSPITAL ENCOUNTER (OUTPATIENT)
Dept: OCCUPATIONAL THERAPY | Age: 62
Setting detail: THERAPIES SERIES
Discharge: HOME | End: 2022-06-15
Payer: MEDICARE

## 2022-06-15 PROCEDURE — 97530 THERAPEUTIC ACTIVITIES: CPT

## 2022-06-15 PROCEDURE — 97110 THERAPEUTIC EXERCISES: CPT

## 2022-06-15 PROCEDURE — 97112 NEUROMUSCULAR REEDUCATION: CPT

## 2022-06-15 ASSESSMENT — PAIN DESCRIPTION - LOCATION: LOCATION: ARM;NECK;LEG

## 2022-06-15 ASSESSMENT — PAIN DESCRIPTION - FREQUENCY: FREQUENCY: CONTINUOUS

## 2022-06-15 ASSESSMENT — PAIN DESCRIPTION - PAIN TYPE: TYPE: ACUTE PAIN

## 2022-06-15 ASSESSMENT — PAIN SCALES - GENERAL: PAINLEVEL_OUTOF10: 5

## 2022-06-15 ASSESSMENT — PAIN DESCRIPTION - ORIENTATION: ORIENTATION: LEFT

## 2022-06-15 NOTE — PROGRESS NOTES
[address]         Phone: [phone]       Fax: [phone]     Occupational Therapy Daily Treatment note     Occupational Therapy: Daily Note   Patient: Kasandra Sweet (89 y.o. female)   Examination Date: 06/15/2022  No data recorded  Progress Note Counter: Eval  & tx 2-3 x/week for 6 wk. MD appt 2022     :  1960 # of Visits since [facility]:   9   MRN: [id number]  CSN: [id number] Start of Care Date:    Insurance: Payor: MEDICARE / Plan: MEDICARE PART A AND B / Product Type: *No Product type* /   Insurance ID: [id number] - (Medicare) Secondary Insurance (if applicable): Insurance Information: Medicare   Referring Physician: MD Dr Immanuel Pickett   PCP: Jorge Castañeda MD Visits to Date/Visits Approved:     No Show/Cancelled Appts:   /       Medical Diagnosis: Unspecified injury at unspecified level of cervical spinal cord, sequela [S14.109S]  Quadriplegia, C5-C7 incomplete [G82.54]  Cauda equina syndrome [G83.4] spinal cord injury , cervical region sequela (S14.109S), incomplete quadriplegia at C5-6 level (G82.54), cauda equina syndrome ( G83.4) - ICD-10 codes  Treatment Diagnosis: bilateral UE weakness & impaired coordination, bilateral shoulder stiffness, impaired sensation - hands lt more than rt  (M62.81, R27.9, M25.611, M25.612,R20.2        SUBJECTIVE EXAMINATION   Pain Level: Pain Screening  Patient Currently in Pain: Yes  Pain Level: 5  Patient's Stated Pain Goal: 0 - No pain  Pain Type: Acute pain  Pain Location: Arm,Neck,Leg  Pain Orientation: Left  Pain Radiating Towards: pain radiates neck to legs, \"mainly it's constantly there\". Pain Descriptors: Aching,Tightness,Pins and needles  Pain Frequency: Continuous    Patient Comments: Subjective: Pt reporting that she has a CT scan on Monday, \"I've had so many\".     HEP Compliance: Good  Comment: Pt wearing ASPEN collar. OBJECTIVE EXAMINATION   Restrictions: Restrictions/Precautions: Fall Risk; General Precautions     TREATMENT     Exercises:     Treatment Reasoning    OT Exercise 1: PROM B UE,shoulders, wrist/hand, finger blocking exercises bilateral hands PIP flex/extension 25x, massage and stretch of left hand/wrist  OT Exercise 2: AROM 15x each: B UE shoulder flexion/extension at 90 degrees, shoulder abduction/adduction at 90 degrees, forearm supination/pronation, arms extended pt complete wrist flexion/extension  OT Exercise 5: pylo ball on table stretch /rom  , B hands, 25 times forward/back, side to side, CW and CCW  OT Exercise 11: BTE #162 hand gripper, T = 10 in lbs, 60 seconds;  pinch , T = 7 in lbs, 60 seconds., B hands, \"the left one was a lot harder\"  OT Exercise 13: orange power web (light resistance) gripping bilateral hands 25x, push for hand/wrist extension  rt/lt 25x  OT Exercise 16: press down cone in putty: 10x each way for rt hand/lt hand  (holding small end cone, holding large end cone)     Limitations addressed: Strength,Coordination,Flexibility   Therapist provided: Verbal cuing   Progressed: Resistance,Repetitions                       Patient Education:         ASSESSMENT     Assessment: Assessment: Pt seen to address B UE ROM, strength, coordination, and fine motor control. Left UE demonstrated less strength and coordination than right in therapeutic exercises. Pt stated that she is getting a CT scan done on Monday. Tolerated tx well today, see OT note for details.   Performance deficits / Impairments: Decreased ROM,Decreased strength,Decreased coordination,Decreased fine motor control,Decreased high-level IADLs,Decreased sensation    Post-Treatment Pain Level:      Prognosis: Good    Activity Tolerance: Patient tolerated treatment well                  TREATMENT PLAN   REQUIRES OT FOLLOW-UP: Yes  Type: Outpatient  Plan  Plan Frequency: 2-3x/week  Plan Weeks: 18 visits  Current Treatment Recommendations: Strengthening,ROM,Patient/Caregiver education & training,Coordination training,Positioning  Plan Comment: continue OT       Therapy Time  Individual Time In: 1132  Individual Time Out: 4721  Minutes: 42  Timed Code Treatment Minutes: 42 Minutes       Electronically signed by SHERLYN Morales  on 6/15/2022 at 5:24 PM       Treatment Charges: Mins Units Time In/Out   [] Evaluation       []  Low       []  Moderate       []  High      []  Electrical Stim      []  Iontophoresis      []  Paraffin      []  Ultrasound        []  Masage      []  Neuromuscular Re-ed      []  ADL      [x]  Ther Exercise 42 3    []  Ther Activities      []  Neuro Re-Ed      []  Splinting      []  Other      Total Treatment time 42 min 3          POC NOTE

## 2022-06-15 NOTE — PROGRESS NOTES
strength. She notes she is still having imbalance with standing and reaching. Feels with reaching overhead she could tilt backwards. Notes she is doing more on her own at home. Getting some assistance with showers as she sits with no backrest. Has to have someone hand her things. Pain:  [x] Yes  [] No Location: Neck down to (B) Shoulders/UEs to knuckles in hands (B) LE's int feet and toes  Pain Rating: (0-10 scale) 5/10  Pain altered Tx:  [x] No  [] Yes  Action:  Comments:     Objective:  Tests and measures  6/15/22  Outcome Measures 5/10/22  6/15/22   ESCOBEDO 19/56  33/56   5xSTS 19. .5   27.46s -- with only 1 UE support    10mWT 0.48m/s  20.35s >> 0.49 m/s with 2WW    Endurance   4:30 with 2WW, reaching 380ft    TUG  35.5 s with 2WW, CGA   25.5s with 2WW, mod IND    SLS L     w/o UE support 2s   W/ mod UE support 10s    SLS R     wlo UE support 2s   W/ light UE support 10s       06/15/22 1224   Escobedo Balance Scale   1. Sitting to Standing 3   2. Standing Unsupported 3   3. Sitting with Back Unsupported but Feet Supported on Floor or on a Stool 4   4. Standing to Sitting 3   5. Transfers 3   6. Standing Unsupported with Eyes Closed 3   7. Standing Unsupported with Feet Together 3   8. Reach Forward with Outstretched Arm While Standing 2   9.  Object from Floor from a Standing Position 2   10. Turning to Look Behind Over Left and Right Shoulders While Standing 2   11. Turn 360 Degrees 1   12. Place Alternate Foot on Step or Stool While Standing Unsupported 1   13. Standing Unsupported One Foot in [facility] 2   14.  Standing on One Leg 1   Escobedo Balance Score 33   Escobedo Balance Disability Index 40-59%   Escobedo CMS Modifier CK%             Modalities:      Exercises:  Exercise Reps/ Time Weight/ Level Completed  Today Comments   nustep  5' Level 3  B UE/LE           MAT LEVEL     Elevated with large wedge and 2 pillows    SKTC  2x30s    L ONLY    HS stretch  2x30s ea      Hip ER stretch  2x30s    L Only   glute bridges 2x10      hooklying clamshells  2x10 Red      Sitting EOM LAQ 2x10 ea red  Seated EOM without back support for core strengthening           Seated        HS stretch 2x30''      Glute stretch 3x30''   LLE only, Assist from PTA          Standing       Sit Stand Staggered stance   3x5 ea    From raised mat table; some assist for keeping LLE in place           HS stretch at step 30\"x3 6\"     Step ups Fwd/Lat 10x2 4\", 2#  NO UE support    3 way hip  10x ea 1.5# L LE   NO UE Support; more of a stepping motion    Slow Controlled Marches 2-3\" to raise and lower 10x 1.5# L LE     Forced WB on 4\" step + 1/2 ball 2x30\" each   Encouraged light touch; WB L LE more difficult than R LE   TKE  20x  red     Slow high march w/ opposite UE raise 10x2 ea 1.5# L LE     Heel/toe raises  10x2      Semi tandem Stance  2x30\"   NO UE Support   EC Foam feet Normal Stance 2x30\"    NO UE Support   EO Foam Feet touching 2x30''  x NO UE support   Standing on foam with press forward & press up  2x10 4# ball  x    Feet together Eye Closed Firm surface 2x30\"    NO UE Support- VERY difficult   Amb in // Bars Fwd/Retro/Side 3 laps 1.5# L LE  Little to no use of UEs ; emphasis on equal step length/height, encouraged less visual reliance; L LE ataxia noted   Ambulation in gym with ' 1.5# L LE      Toe taps 6\" step + 1/2 ball 10x2 ea 1.5#  x Unilateral UE support    Other:    Specific Instructions for next treatment: Continue to challenge LE strengthening, stability and coordination work; challenge patient to decrease visual reliance for increased proprioceptive input. Assessment:    [x] Progressing toward goals. Pt was initially evaluated 5/22/22 due to impaired strength, balance, and mobility after multiple spinal issues. She is progressing well to now using a 2WW for most mobility vs her wheelchair. She is showing good improvements in overall function with therapy thus far.  She is completing most mobility mod IND with 2WW and she was previously more CGA to [place]. Still having difficulty with balance but improving. She is doing more at home and feels that she has increased stability. She makes significant improvement on the ESCOBEDO improving by 14 points to 33/56 but would still be considered a fall risk. Her 5xSTS is higher than segundo but she is showing improved power as she is able to stand with only 1 UE support vs 2. She also shows improved gait fluidity as she improves 10 seconds on the TUG. She is now ambulating with greater stability as she is mod IND with her 2WW. Her gait speed is relatively the same as segundo but feel this could have been influenced by fatigue in the session. Did additional assessment of capacity with walking to max tolerance. Only about to go 4:30 out of 6 minute goal due to fatigue. Will add goal to increase this capacity as she has goal to be able to navigate a grocery store. Feel pt still has difficulty with proprioception, balance, strength, and endurance that can continue to be improved upon. Feel it is appropriate to continue working with PT to improve in these areas to maximize mobility and ensure she is safe and efficient with mobility. Will continue with current POC and will reassess closer to 20 visits. [] No change. [] Other:    [x] Patient would continue to benefit from skilled physical therapy services in order to: improve strength,mobiltiy and motor control that impacts balance and mobility. GOALS -- assessed 6/15/22  STG: (to be met in 10 treatments)  1. Pt will improve score on ESCOBEDO balance scale by >/=6 points to improve overall balance stability and decrease fall risk with mobility. - 6/15: MET -- exceeds   2. Pt will demonstrate >10 SLS stability with light UE support to safely allow pt to enter/exit her tub shower. - 6/15: partially met -- light UE support for RLE, still difficulty on the L   3.  Pt will improve time on TUG to <20s with LRAD at mod IND level to demonstrate decreased fall risk. - 6/15: progressing -- 10 second improvement to 25.5s   4. Pt will increase gait speed to >0.8 m/s  With LRAD at mod I level to demonstrate decreased fall risk and optimize mobility to at community level. - 6/15: limited increase in speed but pt is now mod IND with 2WW   5. Pt will be able to manage her walker up 1 step without instability to improve independence with access to her home. - 6/15: progressing   6. Pt will improve time on 5xSTS to <15 seconds with use of 1 UE to decrease fall risk and increase functional capacity for mobility. - 6/15: completes with unilateral UE support this date but time is increased. LTG: (to be met in 20 treatments)  1. Pt will improve score on ESCOBEDO balance scale by >/= 45/56 points  to improve overall balance stability and decrease fall risk with mobility. - 6/15: progressing -- 33/56    2. Pt will increase strength of all major muscle groups of the LEs to 4/5 or greater demonstrating improved strength needed to maximize safety and efficiency with mobility tasks such as gait, transfers and stairs. - 6/15: progressing   3. Pt will be independent with home program addressing strength, flexibility and balance to maximize gains made in therapy to improve overall functional capacity for mobility.     -6/15: progressing -- will continue to update in POC   4. Pt will improve score on OPTIMAL to 46/90 to demonstrate functional improvements with ADLs.   -6/15: will assess at a later time    5. Pt will report no falls for x6 weeks demonstrating improved safety with mobility and implementation of fall prevention strategies. - 6/15: meeting currently with no falls noted   * further goals may be established based on additional assessments or progress*     Patient stated Goals: to walk without a walker, improve balance     Pt.  Education:  [x] Yes  [] No  [x] Reviewed Prior HEP/Ed  Method of Education: [x] Verbal  [x] Demo  [] Written  Comprehension of Education:  [x] Verbalizes understanding. [] Demonstrates understanding. [] Needs review. [] Demonstrates/verbalizes HEP/Ed previously given.      Plan: [x] Continue per original POC of 2x/wk for total of 20 visits    [] Other:    Treatment Plan:  [x] Therapeutic Exercise   43085  [] Iontophoresis: 4 mg/mL Dexamethasone Sodium Phosphate  mAmin  64761   [x] Therapeutic Activity  21565 [] Vasopneumatic cold with compression  53382    [x] Gait Training   03098 [] Ultrasound   52469   [x] Neuromuscular Re-education  12742 [] Electrical Stimulation Unattended  13615   [x] Manual Therapy  64563 [] Electrical Stimulation Attended  31708   [x] Instruction in HEP  [] Lumbar/Cervical Traction  24620   [] Aquatic Therapy   80006 [] Cold/hotpack    [] Massage   23593      [] Dry Needling, 1 or 2 muscles  59610   [] Biofeedback, first 15 minutes   41820  [] Biofeedback, additional 15 minutes   27407 [] Dry Needling, 3 or more muscles  46390          Treatment Charges: Mins Units   []  Modalities     []  Ther Exercise     []  Manual Therapy     [x]  Ther Activities 28 2   []  Aquatics     [x]  Neuromuscular 15 1   [] Vasocompression     [] Gait Training     [] Dry needling        [] 1 or 2 muscles        [] 3 or more muscles     []  Other     Total Treatment time 43 3    ** KX and 59 Modifiers added**     Time In: 12:14 pm        Time Out: 12:57 pm      Electronically signed by:  Trevon Barkley PT

## 2022-06-17 RX ORDER — BUSPIRONE HYDROCHLORIDE 30 MG/1
TABLET ORAL
Qty: 60 TABLET | Refills: 3 | Status: SHIPPED | OUTPATIENT
Start: 2022-06-17 | End: 2022-10-24

## 2022-06-20 ENCOUNTER — HOSPITAL ENCOUNTER (OUTPATIENT)
Dept: PHYSICAL THERAPY | Age: 62
Setting detail: THERAPIES SERIES
Discharge: HOME | End: 2022-06-20
Payer: MEDICARE

## 2022-06-20 ENCOUNTER — HOSPITAL ENCOUNTER (OUTPATIENT)
Dept: OCCUPATIONAL THERAPY | Age: 62
Setting detail: THERAPIES SERIES
Discharge: HOME | End: 2022-06-20
Payer: MEDICARE

## 2022-06-20 ENCOUNTER — HOSPITAL ENCOUNTER (OUTPATIENT)
Dept: CT IMAGING | Facility: CLINIC | Age: 62
Discharge: HOME | End: 2022-06-22
Payer: MEDICARE

## 2022-06-20 DIAGNOSIS — M43.8X2 SWAN NECK DEFORMITY OF CERVICAL SPINE: ICD-10-CM

## 2022-06-20 PROCEDURE — 72128 CT CHEST SPINE W/O DYE: CPT

## 2022-06-20 PROCEDURE — 72125 CT NECK SPINE W/O DYE: CPT

## 2022-06-20 PROCEDURE — 97110 THERAPEUTIC EXERCISES: CPT

## 2022-06-20 PROCEDURE — 97112 NEUROMUSCULAR REEDUCATION: CPT

## 2022-06-20 PROCEDURE — 97168 OT RE-EVAL EST PLAN CARE: CPT

## 2022-06-20 ASSESSMENT — PAIN DESCRIPTION - FREQUENCY: FREQUENCY: CONTINUOUS

## 2022-06-20 ASSESSMENT — 9 HOLE PEG TEST
TEST_RESULT: FUNCTIONAL
TESTTIME_SECONDS: 22
TESTTIME_SECONDS: 30
TEST_RESULT: IMPAIRED

## 2022-06-20 ASSESSMENT — PAIN SCALES - GENERAL: PAINLEVEL_OUTOF10: 5

## 2022-06-20 ASSESSMENT — PAIN DESCRIPTION - PAIN TYPE: TYPE: ACUTE PAIN

## 2022-06-20 ASSESSMENT — PAIN DESCRIPTION - LOCATION: LOCATION: ARM;LEG;NECK

## 2022-06-20 ASSESSMENT — PAIN DESCRIPTION - ORIENTATION: ORIENTATION: LEFT

## 2022-06-20 NOTE — PROGRESS NOTES
Occupational Therapy: Re_Evaluation   Patient: Cyrus Rose (89 y.o. female)   Examination Date:   Plan of Care Certification Period: 2022 to        :  1960  MRN: [id number]  CSN: [id number]   Insurance: Payor: MEDICARE / Plan: MEDICARE PART A AND B / Product Type: *No Product type* /   Insurance ID: [id number] - (Medicare) Secondary Insurance (if applicable): Insurance Information: Medicare   Referring Physician: MD Dr Julian Lawson   PCP: Gypsy Baron MD Visits to Date/Visits Approved:       No Show/Cancelled Appts:   /       Medical Diagnosis: Unspecified injury at unspecified level of cervical spinal cord, sequela [S14.109S]  Quadriplegia, C5-C7 incomplete [G82.54]  Cauda equina syndrome [G83.4] spinal cord injury , cervical region sequela (S14.109S), incomplete quadriplegia at C5-6 level (G82.54), cauda equina syndrome ( G83.4) - ICD-10 codes  Treatment Diagnosis: bilateral UE weakness & impaired coordination, bilateral shoulder stiffness, impaired sensation - hands lt more than rt  (M62.81, R27.9, M25.611, M25.612,R20.2           SUBJECTIVE EXAMINATION      ,           Subjective History:    Subjective: Pt states that she doing more at home. Pt states that she put outside plants in pots this year since shes couldn't put them in the ground. Pt states she does exericses at home but not like she should. Pt states she does better exercising in therapy.   Additional Pertinent Hx (if applicable): 1401 C5 CORPECTOMY, USE OF INTRAOPERATIVE CERVICAL TRACTION ,2022 POSTERIOR FIXATION C2-C7 ,2021 LUMBAR LAMINECTOMY L2-S1                Pain Screening    Pain Screening  Patient Currently in Pain: Yes  Pain Assessment: 0-10  Pain Level: 5  Post Treatment Pain Level: 5  Patient's Stated Pain Goal: 0 - No pain  Pain Type: Acute pain  Pain Location: Arm,Leg,Neck  Pain Orientation: Left  Pain Descriptors: Tightness,Tingling,Pins and needles,Aching  Pain Frequency: Continuous    Functional Status         OBJECTIVE EXAMINATION   Pt wears [place] Collar.      Left AROM  Right AROM      LUE AROM (degrees)  LUE General AROM: Elbow, forearm,wrist - wfls  L Shoulder Flexion 0-180: 140 - increase  L Shoulder ABduction 0-180: 160 - increase  L Shoulder Int Rotation  0-70: 70- decrease  L Shoulder Ext Rotation  0-90: 85 - increase  Left Hand AROM (degrees)  Left Hand AROM: WFL  Left Hand General AROM: flex,extension, abduction/adduction - wfls  L Thumb Opposition: wfls RUE AROM (degrees)  RUE General AROM: Elbow, forearm, wrist - wfls  R Shoulder Flexion 0-180: 140 - decrease  R Shoulder ABduction 0-180: 165 - increase  R Shoulder Int Rotation  0-70: 90  R Shoulder Ext Rotation 0-90: 90 - increase  Right Hand AROM (degrees)  Right Hand AROM: WFL  Right Hand General AROM: flex,extension, abduction/adduction - wfls  R Thumb Opposition: wfls       Left PROM  Right PROM      LUE PROM (degrees)  LUE General PROM: elbow, wrist, hand - wfls  L Shoulder Flex  0-180: 150 - increase  L Shoulder ABduction 0-180: wfls - increase  L Shoulder Int Rotation  0-70: wfls - increase  L Shoulder Ext Rotation  0-90: [facility] RUE PROM (degrees)  RUE General PROM: elbow, forearm, wrist., hand - wfls  R Shoulder Flex  0-180: wfls - increase  R Shoulder ABduction 0-180: wfls  R Shoulder Int Rotation  0-70: wfls - increase  R Shoulder Ext Rotation  0-90: wfls       Left Strength  Right Strength      LUE Strength  L Shoulder Flex: 3+/5  L Shoulder ABduction: 3+/5  L Shoulder Int Rotation: 4-/5  L Shoulder Ext Rotation: 4-/5  L Elbow Flex: 4/5  L Elbow Ext: 4-/5  L Forearm Pron: 4/5  L Forearm Sup: 4/5  L Wrist Flex: 4/5  L Wrist Ext: 4/5  L Hand General: 3+/5 (flexion & extension)    RUE Strength  R Shoulder Flex: 4-/5  R Shoulder ABduction: 4-/5  R Shoulder Int Rotation: 4/5  R Shoulder Ext Rotation: 4/5  R Elbow Flex: 4/5  R Elbow Ext: 4-/5  R Forearm Pron: 4/5  R Forearm Sup: 4/5  R Wrist Flex: 4/5  R Wrist Ext: 4/5  R Hand General:  (extension 4-/5, flexion 4/5)         Left  Right     Strength  Handle Setting 2: averagae 41.67# - increase   Trial 1: 45  Trial 2: 40  Trial 3: 40  Average: 41.67 Handle Setting 2: average 60# -increase   Trial 1: 60  Trial 2: 60  Trial 3: 60  Average: 60   Pinch Strength (if applicable) Left Hand Strength - Lateral Pinch (lbs)  Trial 1: 12  Trial 2: 12  Trial 3: 12  Average: 12  Left Hand Strength - Farrell (lbs)  Trial 1: 12  Trial 2: 13  Trial 3: 14  Average: 13  Left Hand Strength - Tip (lbs)  Trial 1: 7  Trial 2: 5  Trial 3: 6  Average: 6 Right Hand Strength - Lateral Pinch (lbs)  Trial 1: 10  Trial 2: 12  Trial 3: 12  Average: 11.33  Right Hand Strength - Farrell (lbs)  Trial 1: 15  Trial 2: 15  Trial 3: 13  Average: 14.33  Right Hand Strength - Tip  (lbs)  Trial 1: 9  Trial 2: 9  Trial 3: 9  Average: 9     Fine Motor Skills:   Fine Motor Skills  Left 9-Hole Peg Test: Impaired (9 seconds quicker lt hand)  Left 9 Hole Peg Test Time (secs): 30  Right 9-Hole Peg Test: Functional (4 seconds quicker rt hand)  Right 9 Hole Peg Test Time (secs): 22     Outcome Measure(s) Completed:   Upper Extremity Functional Index   Today, do you or would you have any difficulty at all with:   Any of your usual work, housework, or school activities[de-identified] Moderate difficulty (housework)  Your usual hobbies, re creational or sporting activities[de-identified] Moderate difficulty (planting her flowers in pots this year)  Lifting a bag of groceries to waist level[de-identified] Moderate difficulty (Pt needs to hold onto walker also)  Lifting a bag of groceries above your head[de-identified] Extreme difficulty or unable to perform activity (unable)  Grooming your hair[de-identified] A little bit of difficulty  Pushing up on your hands (eg from bathtub or chair):: No difficulty  Preparing food (eg peeling, cutting):: No difficulty  Driving[de-identified] Extreme difficulty or unable to perform activity (Pt hasn't driven in years)  Vacuuming, sweeping or raking[de-identified] Extreme difficulty or unable to perform activity (unable to vacuum or sweep.)  Dressing[de-identified] No difficulty  Doing up buttons[de-identified] A little bit of difficulty (Pt doesn't wear items with buttons , but did do button activity in OT with unbuttoning harder than buttoning.)  Using tools or appliances[de-identified] No difficulty (use of appliances.)  Opening doors[de-identified] No difficulty  Cleaning[de-identified] Moderate difficulty (Pt states she can't get down on floor to clean.)  Tying or lacing shoes[de-identified] No difficulty  Sleeping[de-identified] No difficulty  Laundering clothes (eg washing, ironing, folding):: No difficulty (Pt's family puts clothes in washer/dryer. Pt able to fold clothes - no difficulty)  Opening a jar[de-identified] No difficulty  Throwing a ball[de-identified] No difficulty  Carrying a small suitcase with your affected limb[de-identified] Moderate difficulty (Pt will use walker whern carrying a bag)  UEFI Total Score: 56  UEFI Total Percentage: 70 %     Total Score (out of 80) - if applicable: 56   Current Percentage of Function: 70 % % (Date: 6/20/2022)    Interpretation of Score: The final UEFI score ranges between 0 and 80 points. Scores closer to 0 indicate severe limitation whilst scores closer to 80 indicate very little to no limitation. The significant change ([address]) between two subsequent evaluations is set at 9 points. Higher Score indicates less disability, more function. ASSESSMENT     Impression: Assessment: Pt making progress toward goals and functional activities (ADL/light IADLs). Pt continues with weakness bilateral shoulder flexors & abductors, & hand extensors. Pt demo improving bilateral hand fine motor ADLs. See goal section for details. Pt will benefit from continued OT to provide therapeutic exercises to improve pt's functional goals, UE strength, bilateral shoulder functional reach,  & lt hand dexterity for her daily activities.     Performance Deficits/Impairments: Decreased ROM,Decreased strength,Decreased coordination,Decreased fine motor control,Decreased high-level IADLs,Decreased sensation    Statement of Medical Necessity: Occupational Therapy is both indicated and medically necessary as outlined in the POC to increase the likelihood of meeting the functionally related goals stated below.    :        Patient's rehabilitation potential/prognosis is considered to be: Good       GOALS      06/20/22 1104   Patient Goals    Patient goals  To use her arms /hands as best she can to function. Pt also wants to improve her walking & will be getting PT for that goal. Pt making progress toward her functional goal for her arms. Short Term Goals   Short Term Goal 1 Pt will demo & verbalize independence with bilateral UE HEP   STG 1 Current Status: HEP theraputty exercises for her hands. STG Goal 1 Status: Met   Short Term Goal 2 Pt will report improving independence & less difficulty with fine motor activities. Pt will complete 9 hole peg test 4 seconds quicker with rt hand, & 5 seconds quicker with lt hand. STG 2 Current Status: Goal Met for rt/lt hands with testing & fine motor task. STG Goal 2 Status: Met   Short Term Goal 3 Increase bilateral hand strength for daily activities: increase rt ( by 5# & tip pinch by 2#), increase lt ( by 5#, lateral & tip pinch by 2#)   STG 3 Current Status: Goal Met bilateral . Goal partially Met lt lateral & tip pinch. Goal ongoing bilateral tip pinch & lt lateral pinch. STG Goal 3 Status: Met; In progress;Partially met   Short Term Goal 4 Increase bilateral shoulder functional AROM by 10 : lt shoulder (fleixon, abduction, IR,ER), & rt shoulder (flexion, abduction, ER ) to improve reaching for selfcare & reach into cabinets. STG 4 Current Status: Goal Met lt shoulder (flexion, abduction, ER), rt shoulder ER. Partially Met rt shoulder abduction. Goal ongoing lt shoulder IR,rt shoulder flexion & abduction. STG Goal 4 Status: Met; In progress;Partially met   Long Term Goals   Long Term Goal 1 Using the UEFI pt will report a score of 3 (little difficulty) with doing light housework, hair grooming, lifting groceries to waist ht, cutpeeling food, getting dressing, button manipulation. LTG 1 Current Status: Total UEFI score increased from 44 to 56. Goal Met : hair grooming, cut/peel food, gettting dressed, button manipulation. Goal ongoing housework and lifting groceries. LTG Goal 1 Status: In progress;Met   Long Term Goal 2 Pt will verbalize increase sensory awareness with lt hand: increase sterognosis accuracy to 50% (identify 4 out of 8 objects correctly)   LTG 2 Current Status: To be retested. Long Term Goal 3 compared to eval increase PROM: rt shoulder flexion & IR by 10, lt shoulder flexion, abduction, IR by 10   LTG 3 Current Status: Goal Met rt/lt shoulder PROM. LTG Goal 3 Status: Met   Long Term Goal 4 Increase bilateral UE strength to 4/5 : rt  UE shoulder (flexion,abduction, IR,ER),wrist & hand extension, lt UE shoulder (flexion,abduction,ER),supination,wrist (flexion,extension),& hand extension to improve strength for selfcare & light home task. [facility] LTG 4 Current Status: Goal Met rt UE( shoulder IR & ER, wrist extension). Goal Met lt UE (supination, wrist(flexion,extension). Goal ongoing bilateral shoulder (flexion,extension)& hand extension, lt shoulder ER. LTG Goal 4 Status: Met; In progress   Long Term Goal 5 Compared to eval increase lt hand strength:  by 10# & tip pinch by 4, & improve lt hand fine motor dexterity by 10 seconds   LTG 5 Current Status: Goal Met lt . Goal partially Met 9 hole peg test.  Goal ongoing lt  tip pinch & 9 hole peg test.   LTG Goal 5 Status: Met; In progress;Partially met   Additional Goals? Yes   Long Term Goal 6 compared to eval increase AROM lt shoulder (flexion & abduction) by 15-20   LTG 6 Current Status: Goal Met lt shoulder abduction. Goal Partially Met lt shoulder flexion. Goal ongoing for flexion. LTG Goal 6 Status:  In progress;Partially met;Met       TREATMENT PLAN REQUIRES OT FOLLOW-UP: Yes  Type: Outpatient  Plan Comment: Send re-eval to MD . Recommend continue OT. Pt. and/or family actively involved in establishing Plan of Care and Goals: Yes       Treatment may include any combination of the following: Strengthening,ROM,Patient/Caregiver education & training,Coordination training,Positioning     Frequency / Duration:  Patient to be seen 2-3x/week for recommend continue 10 more visits after 6- OT re-eval     Therapy Time  Individual Time In: 1104  Individual Time Out: 1132  Minutes: 28        Therapist Signature: Vania Pizano OT    Date: 9/76/3368     I certify that the above [address] are being furnished while the patient is under my care. I agree with the treatment plan and certify that this therapy is necessary. Physician's Signature:  ___________________________   Date:_______                                                                   Loli Gilliam MD        Physician Comments: _______________________________________________    Please sign and return to [address] [place] [address]. Please fax to the location listed below.  [facility] YOU for this referral!    [address] [facility]  [address] [facility], [address] [facility]  Dept: [phone]  Dept Fax: [phone]  Loc: [phone]       POC NOTE      Treatment Charges: Mins Units Time In/Out   [] Evaluation       []  Low       []  Moderate       []  High      []  Electrical Stim      []  Iontophoresis      []  Paraffin      []  Ultrasound        []  Masage      []  Neuromuscular Re-ed      []  ADL      []  Ther Exercise      []  Ther Activities      []  Neuro Re-Ed      []  Splinting      [x] OT re-eval  28 1    Total t time 28

## 2022-06-20 NOTE — PROGRESS NOTES
[address]   [address]   Phone: [phone]   Fax: [phone]    Physical Therapy Daily Treatment Note/Progress Note       Date:  2022  Patient Name:  Nica Morales    :  1960  MRN: [id number]  Physician: Coral Sandra MD                            Insurance: Medicare - based on [place]   Medical Diagnosis:   E66.692F (ICD-10-CM) - Spinal cord injury, cervical region, sequela ([facility] [address]   G82.54 (ICD-10-CM) - Incomplete quadriplegia at C5-6 level ([facility] [address]   G83.4 (ICD-10-CM) - Cauda equina syndrome ([facility] [address]   Rehab Codes: R25 pain , R53.1 weakness, Z74.0 reduced mobility, R29.6 high fall risk, R27.8 lack of coordination, R26.89 gait abnormality   Date of symptom onset: 21 -- cauda equina; 22 Cervical surgeries   Next 's appt. : 22 with Neurosurgery; 22 with PMR Dr. Jose Manuel Sood   Total Visits:   Cx/Ns:0/0   Date of initial visit: 22        Date of PN: 6/15/22 (visit 10)  Formal progress note reporting period:  22 - 6/15/22  PN needed by visit 10     Precautions: C-collar must be worn due to s/p C2-C7 fixation; not lifting greater than a gallon of milk: Left AFO      Subjective:    : Patient states she feels a little fatigued coming from OT because she had to do a lot of tests and exercises. Patient states she is excited because her L hand is getting more motion and strength. Patient denies fall since last session. Patient notes she will be busy/moving more because her granddaughter is coming to stay the summer with her. Pain:  [x] Yes  [] No Location: Neck down to (B) Shoulders/UEs to knuckles in hands (B) LE's int feet and toes  Pain Rating: (0-10 scale) 4/10  Pain altered Tx:  [x] No  [] Yes  Action:  Comments: 6/20 patient arrived for PT appointment 5' late due to OT appointment running over because of OT progress note.      Objective:  Modalities:    Exercises:  Exercise Reps/ Time Weight/ Level Completed  Today Comments   nustep  5' Level 3 x B UE/LE           MAT LEVEL     Elevated with large wedge and 2 pillows    SKTC  2x30s    L ONLY    HS stretch  2x30s ea      Hip ER stretch  2x30s    L Only   glute bridges  2x10      hooklying clamshells  2x10 Red      Sitting EOM LAQ 2x10 ea red  Seated EOM without back support for core strengthening           Seated        HS stretch 2x30''      Glute stretch 3x30''   LLE only, Assist from PTA          Standing       Sit Stand Staggered stance   3x5 ea    From raised mat table; some assist for keeping LLE in place           HS stretch at step 30\"x3 6\"     Step ups Fwd/Lat 10x2 6\", 2# x unilateral UE support    3 way hip  10x ea  2 sets 2#  x NO UE Support; more of a stepping motion    Slow Controlled Marches 2-3\" to raise and lower 10x  2 sets  2# B LE's x    Forced WB on 4\" step + 1/2 ball 2x30\" each   Encouraged light touch; WB L LE more difficult than R LE   TKE  20x  red     Slow high march w/ opposite UE raise 10x2 ea 1.5# L LE     Heel/toe raises  10x2      Semi tandem Stance  3x30\"  x NO UE Support   EC Foam feet Normal Stance 2x30\"    NO UE Support   EO/ EC Foam Feet touching 3x30''  x NO UE support  Alternated EO/EC   Standing on foam with press forward & press up  2x10 4# ball  x    Feet together Eye Closed Firm surface 2x30\"    NO UE Support- VERY difficult   Amb in // Bars Fwd/Retro/Side 3 laps 1.5# L LE  Little to no use of UEs ; emphasis on equal step length/height, encouraged less visual reliance; L LE ataxia noted   Ambulation in gym with ' 1.5# L LE      Toe taps 6\" step + 1/2 ball 10x2 ea 2# x Unilateral UE support    Other:    Specific Instructions for next treatment: Continue to challenge LE strengthening, stability and coordination work; challenge patient to decrease visual reliance for increased proprioceptive input. Assessment:    [x] Progressing toward goals.   6/20: Continued with charted exercises to promote LE strength,balance, and endurance. Able to progress with addition of sets, and weight for most exercise completed today. All exercises completed with use of CGA, patient demonstrated LOB/instability but able to self correct with use of UE's. Patient noted overall fatigue from session but still able to walk to lobby without issue. [] No change. [] Other:    [x] Patient would continue to benefit from skilled physical therapy services in order to: improve strength,mobiltiy and motor control that impacts balance and mobility. GOALS -- assessed 6/15/22  STG: (to be met in 10 treatments)  1. Pt will improve score on ESCOBEDO balance scale by >/=6 points to improve overall balance stability and decrease fall risk with mobility. - 6/15: MET -- exceeds   2. Pt will demonstrate >10 SLS stability with light UE support to safely allow pt to enter/exit her tub shower. - 6/15: partially met -- light UE support for RLE, still difficulty on the L   3. Pt will improve time on TUG to <20s with LRAD at mod IND level to demonstrate decreased fall risk. - 6/15: progressing -- 10 second improvement to 25.5s   4. Pt will increase gait speed to >0.8 m/s  With LRAD at mod I level to demonstrate decreased fall risk and optimize mobility to at community level. - 6/15: limited increase in speed but pt is now mod IND with 2WW   5. Pt will be able to manage her walker up 1 step without instability to improve independence with access to her home. - 6/15: progressing   6. Pt will improve time on 5xSTS to <15 seconds with use of 1 UE to decrease fall risk and increase functional capacity for mobility. - 6/15: completes with unilateral UE support this date but time is increased. LTG: (to be met in 20 treatments)  1. Pt will improve score on ESCOBEDO balance scale by >/= 45/56 points  to improve overall balance stability and decrease fall risk with mobility. - 6/15: progressing -- 33/56    2.   Pt will increase strength of all major muscle groups of the LEs to 4/5 or greater demonstrating improved strength needed to maximize safety and efficiency with mobility tasks such as gait, transfers and stairs. - 6/15: progressing   3. Pt will be independent with home program addressing strength, flexibility and balance to maximize gains made in therapy to improve overall functional capacity for mobility.     -6/15: progressing -- will continue to update in POC   4. Pt will improve score on OPTIMAL to 46/90 to demonstrate functional improvements with ADLs.   -6/15: will assess at a later time    5. Pt will report no falls for x6 weeks demonstrating improved safety with mobility and implementation of fall prevention strategies. - 6/15: meeting currently with no falls noted   * further goals may be established based on additional assessments or progress*     Patient stated Goals: to walk without a walker, improve balance     Pt. Education:  [x] Yes  [] No  [x] Reviewed Prior HEP/Ed  Method of Education: [x] Verbal  [x] Demo  [] Written  Comprehension of Education:  [x] Verbalizes understanding. [] Demonstrates understanding. [] Needs review. [] Demonstrates/verbalizes HEP/Ed previously given.      Plan: [x] Continue per POC    [] Other:          Treatment Charges: Mins Units   []  Modalities     []  Ther Exercise     []  Manual Therapy     [x]  Ther Activities 25 2   []  Aquatics     [x]  Neuromuscular 15 1   [] Vasocompression     [] Gait Training     [] Dry needling        [] 1 or 2 muscles        [] 3 or more muscles     []  Other     Total Treatment time 40 3       Time In: 1135am        Time Out: 12:15pm    Electronically signed by:  Hank Causey PTA

## 2022-06-20 NOTE — PROGRESS NOTES
[facility] [address] [place] [address]. [address]         Phone: [phone]       Fax: [phone]     Occupational Therapy Daily Treatment note     Occupational Therapy: Daily Note   Patient: Cyrus Rose (47 y.o. female)   Examination Date: 2022  No data recorded  Progress Note Counter: Eval  & tx 2-3 x/week for 6 wk. MD appt 2022     :  1960 # of Visits since [facility]:   10   MRN: [id number]  CSN: [id number] Start of Care Date:    Insurance: Payor: MEDICARE / Plan: MEDICARE PART A AND B / Product Type: *No Product type* /   Insurance ID: [id number] - (Medicare) Secondary Insurance (if applicable):     Insurance Information:     Referring Physician: Trisha Paloimno MD     PCP: Gypsy Baron MD Visits to Date/Visits Approved:   /      No Show/Cancelled Appts:   /       Medical Diagnosis: Unspecified injury at unspecified level of cervical spinal cord, sequela [S14.109S]  Quadriplegia, C5-C7 incomplete [G82.54]  Cauda equina syndrome [G83.4]    Treatment Diagnosis: bilateral UE weakness & impaired coordination, bilateral shoulder stiffness, impaired sensation - hands lt more than rt  (M62.81, R27.9, M25.611, M25.612,R20.2        SUBJECTIVE EXAMINATION   Pain Level:      Patient Comments:      HEP Compliance: Good        OBJECTIVE EXAMINATION   Restrictions: Restrictions/Precautions: Fall Risk; General Precautions          TREATMENT     Exercises:     Treatment Reasoning    OT Exercise 1: PROM B UE,shoulders, wrist/hand, finger blocking exercises bilateral hands PIP flex/extension 25x, massage and stretch of left hand/wrist  OT Exercise 2: AROM 15x each: B UE shoulder flexion/extension at 90 degrees, shoulder abduction/adduction at 90 degrees, forearm supination/pronation, arms extended pt complete wrist flexion/extension  OT Exercise 3: Desensitization exercises with towel B hands, pull in then push out, 10x  Limitations addressed: Strength,Coordination,Flexibility   Therapist provided: Verbal cuing   Progressed: Resistance,Repetitions                       Patient Education:         ASSESSMENT     Assessment: Assessment: OT focus B UE stretching, AROM, to improve strength and fine motor control. Left hand massage and stretch, pt reports \"it feels tight\". Pt had reevaluation with OT today, seen short time, see OT note for details.   Performance deficits / Impairments: Decreased ROM,Decreased strength,Decreased coordination,Decreased fine motor control,Decreased high-level IADLs,Decreased sensation    Post-Treatment Pain Level:      Prognosis: Good    Activity Tolerance: Patient tolerated treatment well                  TREATMENT PLAN   REQUIRES OT FOLLOW-UP: Yes  Type: Outpatient  Plan  Plan Frequency: 2-3x/week  Plan Weeks: 18 visits  Current Treatment Recommendations: Strengthening,ROM,Patient/Caregiver education & training,Coordination training,Positioning  Plan Comment: continue OT       Therapy Time  Individual Time In: 9235  Individual Time Out: 7066  Minutes: 16  Timed Code Treatment Minutes: 16 Minutes       Electronically signed by SHERLYN Desouza  on 6/20/2022 at 11:37 AM       Treatment Charges: Mins Units Time In/Out   [] Evaluation       []  Low       []  Moderate       []  High      []  Electrical Stim      []  Iontophoresis      []  Paraffin      []  Ultrasound        []  Masage      []  Neuromuscular Re-ed      []  ADL      [x]  Ther Exercise 16 1    []  Ther Activities      []  Neuro Re-Ed      []  Splinting      []  Other      Total Treatment time 16 min 1          POC NOTE

## 2022-06-22 ENCOUNTER — HOSPITAL ENCOUNTER (OUTPATIENT)
Dept: PHYSICAL THERAPY | Age: 62
Setting detail: THERAPIES SERIES
Discharge: HOME | End: 2022-06-22
Payer: MEDICARE

## 2022-06-22 ENCOUNTER — HOSPITAL ENCOUNTER (OUTPATIENT)
Dept: OCCUPATIONAL THERAPY | Age: 62
Setting detail: THERAPIES SERIES
Discharge: HOME | End: 2022-06-22
Payer: MEDICARE

## 2022-06-22 PROCEDURE — 97110 THERAPEUTIC EXERCISES: CPT

## 2022-06-22 PROCEDURE — 97116 GAIT TRAINING THERAPY: CPT

## 2022-06-22 ASSESSMENT — PAIN DESCRIPTION - ORIENTATION: ORIENTATION: LEFT;RIGHT

## 2022-06-22 ASSESSMENT — PAIN DESCRIPTION - PAIN TYPE: TYPE: ACUTE PAIN

## 2022-06-22 ASSESSMENT — PAIN DESCRIPTION - LOCATION: LOCATION: ARM;LEG;NECK

## 2022-06-22 ASSESSMENT — PAIN SCALES - GENERAL: PAINLEVEL_OUTOF10: 5

## 2022-06-22 NOTE — PROGRESS NOTES
[facility] [address] [place] [address]. [address]         Phone: [phone]       Fax: [phone]     Occupational Therapy Daily Treatment note     Occupational Therapy: Daily Note   Patient: Mary Meng (10 y.o. female)   Examination Date: 2022  No data recorded  Progress Note Counter: Eval  & tx 2-3 x/week for 6 wk. MD appt 2022     :  1960 # of Visits since [facility]:   12   MRN: [id number]  CSN: [id number] Start of Care Date:    Insurance: Payor: MEDICARE / Plan: MEDICARE PART A AND B / Product Type: *No Product type* /   Insurance ID: [id number] - (Medicare) Secondary Insurance (if applicable): Insurance Information: Medicare   Referring Physician: MD Dr Tres Retana   PCP: Mal Moritz, MD Visits to Date/Visits Approved:     No Show/Cancelled Appts:   /       Medical Diagnosis: Unspecified injury at unspecified level of cervical spinal cord, sequela [S14.109S]  Quadriplegia, C5-C7 incomplete [G82.54]  Cauda equina syndrome [G83.4] spinal cord injury , cervical region sequela (S14.109S), incomplete quadriplegia at C5-6 level (G82.54), cauda equina syndrome ( G83.4) - ICD-10 codes  Treatment Diagnosis: bilateral UE weakness & impaired coordination, bilateral shoulder stiffness, impaired sensation - hands lt more than rt  (M62.81, R27.9, M25.611, M25.612,R20.2        SUBJECTIVE EXAMINATION   Pain Level: Pain Screening  Patient Currently in Pain: Yes  Pain Assessment: 0-10  Pain Level: 5  Post Treatment Pain Level: 5  Patient's Stated Pain Goal: 0 - No pain  Pain Type: Acute pain  Pain Location: Arm,Leg,Neck  Pain Orientation: Left,Right  Pain Descriptors: Pins and needles,Tightness,Aching    Patient Comments: Subjective: Pt reports pain both arms /legs, neck. Pt reports lt hand is tight today.     HEP Compliance: Good        OBJECTIVE EXAMINATION   Restrictions: Restrictions/Precautions: Fall Risk; General Precautions    Pt wears [place] collar  TREATMENT     Exercises:     Treatment Reasoning    OT Exercise 1: PROM B UE,shoulders, wrist/hand, finger blocking exercises bilateral hands PIP flex/extension 25x, massage and stretch of left hand/wrist  OT Exercise 2: AROM 15x each: B UE shoulder flexion/extension at 90 degrees, shoulder abduction/adduction at 90 degrees,Bilateral 1# 15x each forearm supination/pronation, wrist flexion/extension (palm down, palm up)  OT Exercise 4: Red 3# digiflex 20x gripping bilateral hands,yellow 1.5# digiflex each finger oppositional pinch 20x, B hands - lay digiflex on table to do little finger pinching  OT Exercise 6: hand based skate board  20x each way using rt hand, using lt hand : forward/back, side to side, circles cw & cccw  OT Exercise 8: velcro board, remove 32 pieces left and place to right then place back on board, remove 32 pieces right and place to left then place back on board  OT Exercise 12: graded clothespins: place/remove  6 yellow 1#, 6 red 2#, 6 green 4#  ( using rt hand, using lt hand) - pt sitting  OT Exercise 17: key pegs : using lt hand place all 25 pegs in board, them remove pegs & hold in lt hand.  Limitations addressed: Strength,Coordination,Flexibility   Therapist provided: Verbal cuing   Progressed: Resistance   Progressed: Begun graded clothespins for bilateral UE reaching & hand strength. Key pegs to improve lt hand dexterity. Progressed pt to red digiflex for gripping. ASSESSMENT     Assessment: Assessment: OT tx includes bilateral UE PROM, & pt completing therapeutic exercises  for improving bilateral UE/hand strength, coordination, lt shoulder rom. Pt completes upgraded prograrm manipulation of graded clothespins with some arm fatigue needing rest break. Pt places key pegs using lt hand with increase time, & drops 4 pegs when removing from board. Pt reports red digiflex is harder to squeeze.  See OT exercises for details.   Performance deficits / Impairments: Decreased ROM,Decreased strength,Decreased coordination,Decreased fine motor control,Decreased high-level IADLs,Decreased sensation    Post-Treatment Pain Level: 5    Prognosis: Good    Activity Tolerance: Patient tolerated treatment well                    TREATMENT PLAN   REQUIRES OT FOLLOW-UP: Yes  Type: Outpatient  Plan  Plan Frequency: 2-3x/week  Plan Weeks: 18 visits  Current Treatment Recommendations: Strengthening,ROM,Patient/Caregiver education & training,Coordination training,Positioning  Plan Comment: continue OT       Therapy Time  Individual Time In: 1120  Individual Time Out: 0664  Minutes: 48  Timed Code Treatment Minutes: 48 Minutes       Electronically signed by Cristo Woodward OT  on 6/22/2022 at 12:29 PM       Treatment Charges: Mins Units Time In/Out   [] Evaluation       []  Low       []  Moderate       []  High      []  Electrical Stim      []  Iontophoresis      []  Paraffin      []  Ultrasound        []  Masage      []  Neuromuscular Re-ed      []  ADL      [x]  Ther Exercise 48 3    []  Ther Activities      []  Neuro Re-Ed      []  Splinting      []  Other      Total Treatment time 48 min 3          POC NOTE

## 2022-06-22 NOTE — FLOWSHEET NOTE
[facility] Outpatient Physical Therapy   [address]   Phone: [phone]   Fax: [phone]    Physical Therapy Daily Treatment Note/Progress Note       Date:  2022  Patient Name:  Lynn Miller    :  1960  MRN: [id number]  Physician: Kennedi Smith MD                            Insurance: Medicare - based on [place] -- [address] [place] [address] Diagnosis:   P40.927M (ICD-10-CM) - Spinal cord injury, cervical region, sequela ([facility]   G82.54 (ICD-10-CM) - Incomplete quadriplegia at C5-6 level ([facility]   G83.4 (ICD-10-CM) - Cauda equina syndrome ([facility]   Rehab Codes: R25 pain , R53.1 weakness, Z74.0 reduced mobility, R29.6 high fall risk, R27.8 lack of coordination, R26.89 gait abnormality   Date of symptom onset: 21 -- cauda equina; 22 Cervical surgeries   Next 's appt. : 22 with Neurosurgery; 22 with PMR Dr. Rick Lezama   Total Visits:   Cx/Ns:0/0   Date of initial visit: 22        Date of PN: 6/15/22 (visit 10)    PN needed by visit 20     Precautions: C-collar must be worn due to s/p C2-C7 fixation; not lifting greater than a gallon of milk: Left AFO      Subjective:    Pt arrives with 2WW. She feels tight and that her LEs \"feels like they are 100 lbs ea. \" Feels some stretching will help. Notes she tried walking with her walker at the grocery store but then got tired and needed to use the driving cart. Felt that her endurance was the main limiting factor.      Pain:  [x] Yes  [] No Location: Neck down to (B) Shoulders/UEs to knuckles in hands (B) LE's int feet and toes  Pain Rating: (0-10 scale) 5/10  Pain altered Tx:  [x] No  [] Yes  Action:  Comments:       Objective:  Modalities:    Exercises:  Exercise Reps/ Time Weight/ Level Completed  Today Comments   nustep  5' Level 3  B UE/LE           MAT LEVEL     Elevated with large wedge and 2 pillows    SKTC  2x30s ea  x     HS stretch  2x30s ea  x    Figure 4 stretch  2x30s ea  x    glute utilize eyes closed to build this awareness. Increased ankle weight for strengthening. Added resistance to sit to stands for greater glute recruitment. Ended session with trialing use of unilateral AD, LBQC. Pt initially taking small steps in a 3 point pattern. Able to progress to good step through pattern with 2 point gait (L foot and quad cane advancing at same time). Has mild instability of 1-2 balance checks but self corrects. Pt fatigued after each bout due to stabilizing musculature proximally. Overall pt does very well with a quad cane and should continue to improve with this in therapy. [] No change. [] Other:    [x] Patient would continue to benefit from skilled physical therapy services in order to: improve strength,mobiltiy and motor control that impacts balance and mobility. GOALS -- assessed 6/15/22  STG: (to be met in 10 treatments)  1. Pt will improve score on ESCOBEDO balance scale by >/=6 points to improve overall balance stability and decrease fall risk with mobility. - 6/15: MET -- exceeds   2. Pt will demonstrate >10 SLS stability with light UE support to safely allow pt to enter/exit her tub shower. - 6/15: partially met -- light UE support for RLE, still difficulty on the L   3. Pt will improve time on TUG to <20s with LRAD at mod IND level to demonstrate decreased fall risk. - 6/15: progressing -- 10 second improvement to 25.5s   4. Pt will increase gait speed to >0.8 m/s  With LRAD at mod I level to demonstrate decreased fall risk and optimize mobility to at community level. - 6/15: limited increase in speed but pt is now mod IND with 2WW   5. Pt will be able to manage her walker up 1 step without instability to improve independence with access to her home. - 6/15: progressing   6. Pt will improve time on 5xSTS to <15 seconds with use of 1 UE to decrease fall risk and increase functional capacity for mobility.    - 6/15: completes with unilateral UE support this date but time is increased. LTG: (to be met in 20 treatments)  1. Pt will improve score on ESCOBEDO balance scale by >/= 45/56 points  to improve overall balance stability and decrease fall risk with mobility. - 6/15: progressing -- 33/56    2. Pt will increase strength of all major muscle groups of the LEs to 4/5 or greater demonstrating improved strength needed to maximize safety and efficiency with mobility tasks such as gait, transfers and stairs. - 6/15: progressing   3. Pt will be independent with home program addressing strength, flexibility and balance to maximize gains made in therapy to improve overall functional capacity for mobility.     -6/15: progressing -- will continue to update in POC   4. Pt will improve score on OPTIMAL to 46/90 to demonstrate functional improvements with ADLs.   -6/15: will assess at a later time    5. Pt will report no falls for x6 weeks demonstrating improved safety with mobility and implementation of fall prevention strategies. - 6/15: meeting currently with no falls noted   * further goals may be established based on additional assessments or progress*     Patient stated Goals: to walk without a walker, improve balance     Pt. Education:  [x] Yes  [] No  [x] Reviewed Prior HEP/Ed  Method of Education: [x] Verbal  [x] Demo  [] Written  Comprehension of Education:  [x] Verbalizes understanding. [] Demonstrates understanding. [] Needs review. [] Demonstrates/verbalizes HEP/Ed previously given.      Plan: [x] Continue per POC    [] Other:          Treatment Charges: Mins Units   []  Modalities     [x]  Ther Exercise 31 2   []  Manual Therapy     []  Ther Activities     []  Aquatics     []  Neuromuscular     [] Vasocompression     [x] Gait Training 10 1   [] Dry needling        [] 1 or 2 muscles        [] 3 or more muscles     []  Other     Total Treatment time 41 3   * KX modifier needed*     Time In: 1032am        Time Out: 11:13pm    Electronically signed by:  Starlette Number, PT

## 2022-06-24 ENCOUNTER — CARE COORDINATION (OUTPATIENT)
Dept: CARE COORDINATION | Age: 62
End: 2022-06-24

## 2022-06-24 NOTE — CARE COORDINATION
Left VM message asking patient to call me back at [phone] for care management follow up. Will call again in a few weeks. She is still going to PT and OT, sees PCP next week.     Future Appointments   Date Time Provider Department Center   6/27/2022  3:20 PM Lord Shreyas MD [email or web address]   6/29/2022 11:30 AM Tinnie Alias, OT [facility]   6/29/2022 12:15 PM Arno Paling, PTA [facility]   7/1/2022 10:45 AM Huntly Beams, PT [facility]   7/1/2022 11:30 AM Tinnie Alias, OT [facility]   7/5/2022 11:15 AM Leonela Klein, SHERLYN [facility]   7/5/2022 12:15 PM Markel Tapia, PTA [facility]   7/6/2022 11:30 AM Christopher Fellers [facility]   7/7/2022 10:45 AM Arno Paling, PTA [facility]   7/7/2022 11:30 AM Tinnie Alias, OT [facility]   7/12/2022 10:30 AM Huntly Beams, PT [facility]   7/12/2022 11:15 AM Tinnie Alias, OT [facility]   7/14/2022 10:30 AM Huntly Beams, PT [facility]   7/14/2022 11:15 AM Tinnie Alias, OT [facility]   7/19/2022 10:30 AM Leonela Klein, SHERLYN [facility]   7/19/2022 11:15 AM Huntly Beams, PT [facility]   7/21/2022 10:30 AM Huntly Beams, PT [facility]   7/21/2022 11:15 AM Tinnie Alias, OT [facility]   8/17/2022  3:30 PM Jhoana Penn

## 2022-06-29 ENCOUNTER — HOSPITAL ENCOUNTER (OUTPATIENT)
Dept: PHYSICAL THERAPY | Age: 62
Setting detail: THERAPIES SERIES
Discharge: HOME | End: 2022-06-29
Payer: MEDICARE

## 2022-06-29 ENCOUNTER — HOSPITAL ENCOUNTER (OUTPATIENT)
Dept: OCCUPATIONAL THERAPY | Age: 62
Setting detail: THERAPIES SERIES
Discharge: HOME | End: 2022-06-29
Payer: MEDICARE

## 2022-06-29 PROCEDURE — 97112 NEUROMUSCULAR REEDUCATION: CPT

## 2022-06-29 PROCEDURE — 97110 THERAPEUTIC EXERCISES: CPT

## 2022-06-29 PROCEDURE — 97116 GAIT TRAINING THERAPY: CPT

## 2022-06-29 ASSESSMENT — PAIN DESCRIPTION - LOCATION: LOCATION: HAND

## 2022-06-29 ASSESSMENT — PAIN DESCRIPTION - DESCRIPTORS: DESCRIPTORS: TIGHTNESS

## 2022-06-29 ASSESSMENT — PAIN DESCRIPTION - ORIENTATION: ORIENTATION: LEFT;RIGHT

## 2022-06-29 ASSESSMENT — PAIN DESCRIPTION - FREQUENCY: FREQUENCY: CONTINUOUS

## 2022-06-29 ASSESSMENT — PAIN SCALES - GENERAL: PAINLEVEL_OUTOF10: 0

## 2022-06-29 NOTE — FLOWSHEET NOTE
[facility] Outpatient Physical Therapy   [address]   Phone: [phone]   Fax: [phone]    Physical Therapy Daily Treatment Note/Progress Note       Date:  2022  Patient Name:  Zeb Blackwood    :  1960  MRN: [id number]  Physician: Jordan Troncoso MD                            Insurance: Medicare - based on [place] -- [facility] Diagnosis:   W96.844S (ICD-10-CM) - Spinal cord injury, cervical region, sequela ([facility]   G82.54 (ICD-10-CM) - Incomplete quadriplegia at C5-6 level ([facility]   G83.4 (ICD-10-CM) - Cauda equina syndrome ([facility]   Rehab Codes: R25 pain , R53.1 weakness, Z74.0 reduced mobility, R29.6 high fall risk, R27.8 lack of coordination, R26.89 gait abnormality   Date of symptom onset: 21 -- cauda equina; 22 Cervical surgeries   Next 's appt. : 22 with Neurosurgery; 22 with PMR Dr. Desire Clifford   Total Visits:   Cx/Ns:0/0   Date of initial visit: 22        Date of PN: 6/15/22 (visit 10)    PN needed by visit 20     Precautions: C-collar must be worn due to s/p C2-C7 fixation; not lifting greater than a gallon of milk: Left AFO      Subjective:  Patient arrives from OT and states she is feeling very tired, and her legs both feel like 100lb boulders.        Pain:  [x] Yes  [] No Location: Neck down to (B) Shoulders/UEs to knuckles in hands (B) LE's int feet and toes  Pain Rating: (0-10 scale) 5/10  Pain altered Tx:  [x] No  [] Yes  Action:  Comments:       Objective:  Modalities:    Exercises:  Exercise Reps/ Time Weight/ Level Completed  Today Comments   nustep  5' Level 3  B UE/LE           MAT LEVEL     Elevated with large wedge and 2 pillows    SKTC  2x30s ea  x     HS stretch  2x30s ea  x    Figure 4 stretch  2x30s ea  x    glute bridges with band  x15 EO   x15 EC  Red  x Felt increased challenge with eyes closed due to proprioceptive deficits    Isolated hooklying clams   2x15 ea  Green   x Isolated makes none moving limb stabilize                  Standing       Resisted STS    2x10   Green  x From mat table           Step ups Fwd/Lat 10x2 6\", 2#  unilateral UE support    3 way hip w/ eyes closed  10x ea  2 sets 3# aw  With BUE support, eyes closed to build proprioceptive input     Slow Controlled Marches 2-3\" to raise and lower 10x  2 sets  3# B LE's x    Forced WB on 4\" step + 1/2 ball 2x30\" each   Encouraged light touch; WB L LE more difficult than R LE   TKE  20x  red     Slow high march w/ opposite UE raise 10x2 ea 3# L LE x    Heel/toe raises  10x2      Semi tandem Stance  3x30\"   NO UE Support   EC Foam feet Normal Stance 2x30\"    NO UE Support   EO/ EC Foam Feet touching 3x30''   NO UE support  Alternated EO/EC   Standing on foam with press forward & press up  2x10 4# ball      Feet together Eye Closed Firm surface 2x30\"    NO UE Support- VERY difficult    Toe taps 6\" step + 1/2 ball 10x2 ea 2#  Unilateral UE support           GAIT TRAINING        Ambulation with LBQC  10 min  [id number]  x First set with 3# AW, second set without AW                 Other:    Specific Instructions for next treatment: Continue to challenge LE strengthening, stability and coordination work; challenge patient to decrease visual reliance for increased proprioceptive input. Assessment:    [x] Progressing toward goals. Began session with stretching to decrease tension as pt felt it helped her immensely at last session. Patient demonstrates better control with eyes closed bridges and had only minimal sway. Continued with resisted STS from black chair for increasing the challenge of standing from a lower surface. Patient required occasional Miryam for the first 5 reps, but was able to complete without assistance once pt was instructed to rock back and forth to build momentum. Continued with standing exercises with 3# ankle weight for LE strengthening.  Patient had difficulty with slow marches for the first few reps and had a decreased stance time on L LE. Able to improve balance and single leg stance time with opposite UE movement. Added ambulation with [facility] and kept ankle weights on for further strengthening. Once ankle weights were removed, patient was able to ambulate with more symmetrical steps and increase fluidity of gait with LBQC. [] No change. [] Other:    [x] Patient would continue to benefit from skilled physical therapy services in order to: improve strength,mobiltiy and motor control that impacts balance and mobility. GOALS -- assessed 6/15/22  STG: (to be met in 10 treatments)  1. Pt will improve score on ESCOBEDO balance scale by >/=6 points to improve overall balance stability and decrease fall risk with mobility. - 6/15: MET -- exceeds   2. Pt will demonstrate >10 SLS stability with light UE support to safely allow pt to enter/exit her tub shower. - 6/15: partially met -- light UE support for RLE, still difficulty on the L   3. Pt will improve time on TUG to <20s with LRAD at mod IND level to demonstrate decreased fall risk. - 6/15: progressing -- 10 second improvement to 25.5s   4. Pt will increase gait speed to >0.8 m/s  With LRAD at mod I level to demonstrate decreased fall risk and optimize mobility to at community level. - 6/15: limited increase in speed but pt is now mod IND with 2WW   5. Pt will be able to manage her walker up 1 step without instability to improve independence with access to her home. - 6/15: progressing   6. Pt will improve time on 5xSTS to <15 seconds with use of 1 UE to decrease fall risk and increase functional capacity for mobility. - 6/15: completes with unilateral UE support this date but time is increased. LTG: (to be met in 20 treatments)  1. Pt will improve score on ESCOBEDO balance scale by >/= 45/56 points  to improve overall balance stability and decrease fall risk with mobility. - 6/15: progressing -- 33/56    2.   Pt will increase strength of all major muscle groups of the LEs to 4/5 or greater demonstrating improved strength needed to maximize safety and efficiency with mobility tasks such as gait, transfers and stairs. - 6/15: progressing   3. Pt will be independent with home program addressing strength, flexibility and balance to maximize gains made in therapy to improve overall functional capacity for mobility.     -6/15: progressing -- will continue to update in POC   4. Pt will improve score on OPTIMAL to 46/90 to demonstrate functional improvements with ADLs.   -6/15: will assess at a later time    5. Pt will report no falls for x6 weeks demonstrating improved safety with mobility and implementation of fall prevention strategies. - 6/15: meeting currently with no falls noted   * further goals may be established based on additional assessments or progress*     Patient stated Goals: to walk without a walker, improve balance     Pt. Education:  [x] Yes  [] No  [x] Reviewed Prior HEP/Ed  Method of Education: [x] Verbal  [x] Demo  [] Written  Comprehension of Education:  [x] Verbalizes understanding. [] Demonstrates understanding. [] Needs review. [] Demonstrates/verbalizes HEP/Ed previously given.      Plan: [x] Continue per POC    [] Other:          Treatment Charges: Mins Units   []  Modalities     [x]  Ther Exercise 35 2   []  Manual Therapy     []  Ther Activities     []  Aquatics     []  Neuromuscular     [] Vasocompression     [x] Gait Training 10 1   [] Dry needling        [] 1 or 2 muscles        [] 3 or more muscles     []  Other     Total Treatment time 45 3   * KX modifier needed*     Time In: 1215am        Time Out: 1:00pm    Electronically signed by:  Jaquan Barr PTA

## 2022-06-29 NOTE — PROGRESS NOTES
[address]         Phone: [phone]       Fax: [phone]     Occupational Therapy Daily Treatment note     Occupational Therapy: Daily Note   Patient: Nan Santos (1 y.o. female)   Examination Date: 2022  No data recorded  Progress Note Counter: MD appt 817-     :  1960 # of Visits since [facility]:   15   MRN: [id number]  CSN: [id number] Start of Care Date:    Insurance: Payor: MEDICARE / Plan: MEDICARE PART A AND B / Product Type: *No Product type* /   Insurance ID: [id number] - (Medicare) Secondary Insurance (if applicable): Insurance Information: Medicare   Referring Physician: MD Dr Venkatesh Young   PCP: Bernice Campo MD Visits to Date/Visits Approved:     No Show/Cancelled Appts:   /       Medical Diagnosis: Unspecified injury at unspecified level of cervical spinal cord, sequela [S14.109S]  Quadriplegia, C5-C7 incomplete [G82.54]  Cauda equina syndrome [G83.4] spinal cord injury , cervical region sequela (S14.109S), incomplete quadriplegia at C5-6 level (G82.54), cauda equina syndrome ( G83.4) - ICD-10 codes  Treatment Diagnosis: bilateral UE weakness & impaired coordination, bilateral shoulder stiffness, impaired sensation - hands lt more than rt  (M62.81, R27.9, M25.611, M25.612,R20.2        SUBJECTIVE EXAMINATION   Pain Level: Pain Screening  Patient Currently in Pain: No  Pain Level: 0  Post Treatment Pain Level: 0  Pain Location: Hand  Pain Orientation: Left,Right  Pain Descriptors: Tightness  Pain Frequency: Continuous    Patient Comments: Subjective: Pt reports her hands feel tight today but no pain.     HEP Compliance: Good        OBJECTIVE EXAMINATION   Restrictions: Restrictions/Precautions: Fall Risk; General Precautions       TREATMENT     Exercises:     Treatment Reasoning    OT Exercise 1: PROM B UE,shoulders, wrist/hand, finger blocking exercises bilateral hands PIP flex/extension 25x, massage and stretch of left hand/wrist  OT Exercise 8: velcro board, remove 32 pieces left and place to right then place back on board, remove 32 pieces right and place to left then place back on board  OT Exercise 10: BTE #502 B wrists for flexion/extension, T = 4 lbs. , 60 seconds (rt hand, lt hand)  OT Exercise 11: BTE #162 hand gripper, T = 15 in lbs, 90 seconds(rt hand/lt hand);  pinch , T = 10  in lbs, 60 seconds. (rt hand/lt hand)  OT Exercise 13: orange power web (light resistance) gripping bilateral hands 25x, push for hand/wrist extension  rt/lt 25x  OT Exercise 15: yellow therapy ball bilateral UE exercises 20x each way  : roll ball on table (forward/back, side to side, circles cw & ccw) - pt stand , press into sides of ball 20x (pt sitting)     Limitations addressed: Strength,Coordination,Flexibility   Therapist provided: Verbal cuing   Progressed: Resistance   Progressed: Progressed with increase torque BTE tool 162 (/pinch)                            ASSESSMENT     Assessment: Assessment: PROM & massage pt's hands/wrists  to decrease muscle tightness. Pt completes bilateral UE/hand strengthening & coordination exercises. Pt completes BTE exercises sitting with set up & verbal cues. Pt states using lt hand was a little harder with higher resistance BTE tool 162 for gripping. No pain reports during tx. See OT exercises for details.   Performance deficits / Impairments: Decreased ROM,Decreased strength,Decreased coordination,Decreased fine motor control,Decreased high-level IADLs,Decreased sensation    Post-Treatment Pain Level: 0    Prognosis: Good    Activity Tolerance: Patient tolerated treatment well                    TREATMENT PLAN   REQUIRES OT FOLLOW-UP: Yes  Type: Outpatient  Plan  Plan Frequency: 2-3x/week  Plan Weeks: 18 visits  Current Treatment Recommendations: Strengthening,ROM,Patient/Caregiver education & training,Coordination training,Positioning  Plan Comment: continue OT       Therapy Time  Individual Time In: 1130  Individual Time Out: 4917  Minutes: 45  Timed Code Treatment Minutes: 45 Minutes       Electronically signed by Cecilia Patel OT  on 6/29/2022 at 12:22 PM       Treatment Charges: Mins Units Time In/Out   [] Evaluation       []  Low       []  Moderate       []  High      []  Electrical Stim      []  Iontophoresis      []  Paraffin      []  Ultrasound        []  Masage      []  Neuromuscular Re-ed      []  ADL      [x]  Ther Exercise 45 3    []  Ther Activities      []  Neuro Re-Ed      []  Splinting      []  Other      Total Treatment time 45 min 3          POC NOTE

## 2022-07-01 ENCOUNTER — HOSPITAL ENCOUNTER (OUTPATIENT)
Dept: PHYSICAL THERAPY | Age: 62
Setting detail: THERAPIES SERIES
Discharge: HOME | End: 2022-07-01
Payer: MEDICARE

## 2022-07-01 ENCOUNTER — HOSPITAL ENCOUNTER (OUTPATIENT)
Dept: OCCUPATIONAL THERAPY | Age: 62
Setting detail: THERAPIES SERIES
Discharge: HOME | End: 2022-07-01
Payer: MEDICARE

## 2022-07-01 PROCEDURE — 97116 GAIT TRAINING THERAPY: CPT

## 2022-07-01 PROCEDURE — 97110 THERAPEUTIC EXERCISES: CPT

## 2022-07-01 ASSESSMENT — PAIN DESCRIPTION - ORIENTATION: ORIENTATION: LEFT;RIGHT

## 2022-07-01 ASSESSMENT — PAIN DESCRIPTION - DESCRIPTORS: DESCRIPTORS: TIGHTNESS

## 2022-07-01 ASSESSMENT — PAIN DESCRIPTION - LOCATION: LOCATION: ARM;LEG;NECK

## 2022-07-01 ASSESSMENT — PAIN SCALES - GENERAL: PAINLEVEL_OUTOF10: 5

## 2022-07-01 ASSESSMENT — PAIN DESCRIPTION - PAIN TYPE: TYPE: ACUTE PAIN

## 2022-07-01 NOTE — PROGRESS NOTES
[address]         Phone: [phone]       Fax: [phone]     Occupational Therapy Daily Treatment note     Occupational Therapy: Daily Note   Patient: Almaz Bermudez (54 y.o. female)   Examination Date: 2022  No data recorded  Progress Note Counter: MD appt 22     :  1960 # of Visits since [facility]:   15   MRN: [id number]  CSN: [id number] Start of Care Date:    Insurance: Payor: MEDICARE / Plan: MEDICARE PART A AND B / Product Type: *No Product type* /   Insurance ID: [id number] - (Medicare) Secondary Insurance (if applicable): Insurance Information: Medicare   Referring Physician: MD Dr Albin Mcpherson   PCP: Daly Bryant MD Visits to Date/Visits Approved: 15 / 20    No Show/Cancelled Appts:   /       Medical Diagnosis: Unspecified injury at unspecified level of cervical spinal cord, sequela [S14.109S]  Quadriplegia, C5-C7 incomplete [G82.54]  Cauda equina syndrome [G83.4] spinal cord injury , cervical region sequela (S14.109S), incomplete quadriplegia at C5-6 level (G82.54), cauda equina syndrome ( G83.4) - ICD-10 codes  Treatment Diagnosis: bilateral UE weakness & impaired coordination, bilateral shoulder stiffness, impaired sensation - hands lt more than rt  (M62.81, R27.9, M25.611, M25.612,R20.2        SUBJECTIVE EXAMINATION   Pain Level: Pain Screening  Patient Currently in Pain: Yes  Pain Assessment: 0-10  Pain Level: 5  Post Treatment Pain Level: 5  Patient's Stated Pain Goal: 0 - No pain  Pain Type: Acute pain  Pain Location: Arm,Leg,Neck  Pain Orientation: Left,Right  Pain Descriptors: Tightness    Patient Comments: Subjective: Pt states pain all over her body , just the normal pain. Pt states she notices progress using her hands, but lt hand is slower than rt hand.     HEP Compliance: Good        OBJECTIVE EXAMINATION   Restrictions: Restrictions/Precautions: Fall Risk; General Precautions  Pt wears Aspen collar. TREATMENT     Exercises:     Treatment Reasoning    OT Exercise 1: PROM B UE,shoulders, wrist/hand, finger blocking exercises bilateral hands( PIP flex/extension 25x, DIP flex/extension), stretch of left hand/wrist  OT Exercise 2: B UE shoulder( flexion/extension at 90 degrees 1# 15x, shoulder abduction/adduction at 90 degrees 1# 15x),Bilateral 1# 15x each (forearm supination/pronation, wrist flexion/extension (palm down, palm up)  OT Exercise 3: yellow 6# theraband flexbar 20x each way using bilateral hand: twist, make U, make upside down U  OT Exercise 4: Red 3# digiflex 25x gripping bilateral hands,yellow 1.5# digiflex each finger oppositional pinch 20x, B hands - lay digiflex on table to do little finger pinching  OT Exercise 6: hand based skate board  25x each way using rt hand, using lt hand : forward/back, side to side, circles cw & cccw  OT Exercise 14: Purple rom hoop ht 22\" - using rt hand move rings over & back stabilizing board with lt hand, using lt hand move rings over & back stabilizing board with rt hand  OT Exercise 15: yellow therapy ball bilateral UE exercises 20x each way  : roll ball on table (forward/back, side to side, circles cw & ccw) - pt stand , press into sides of ball 20x, press down on ball 20x  (pt standing)  OT Exercise 17: key pegs : using lt hand place all 25 pegs in board, them remove pegs & hold in lt hand.  Limitations addressed: Strength,Coordination,Flexibility   Therapist provided: Verbal cuing   Progressed: Resistance   Progressed: Begun theraband flex bar for bilateral UE strengthening. ASSESSMENT     Assessment: Assessment: PROM bilateral UEs & hands done to decrease muscle tightness. Pt completes bilateral UE/hand strengthening, rom, & coordination exercises. Pt completes theraband flexbar for bilateral wrist/hand & forearm strengthening.  Pt reports lt hand gets

## 2022-07-01 NOTE — FLOWSHEET NOTE
[facility] AT [facility] Outpatient Physical Therapy   [address]   Phone: [phone]   Fax: [phone]    Physical Therapy Daily Treatment Note/Progress Note       Date:  2022  Patient Name:  Andres Mcmillan    :  1960  MRN: [id number]  Physician: Yordan Ayala MD                            Insurance: Medicare - based on [place] -- [address] [place] [address] Diagnosis:   G68.430T (ICD-10-CM) - Spinal cord injury, cervical region, sequela ([facility] [facility]   G82.54 (ICD-10-CM) - Incomplete quadriplegia at C5-6 level ([facility]   G83.4 (ICD-10-CM) - Cauda equina syndrome ([facility] [address]   Rehab Codes: R25 pain , R53.1 weakness, Z74.0 reduced mobility, R29.6 high fall risk, R27.8 lack of coordination, R26.89 gait abnormality   Date of symptom onset: 22 -- cauda equina; 22 Cervical surgeries   Next 's appt. : 22 with Neurosurgery; 22 with PMR Dr. Carlie Mojica   Total Visits:   Cx/Ns:0/0   Date of initial visit: 22        Date of PN: 6/15/22 (visit 10)    PN needed by visit 20     Precautions: C-collar must be worn due to s/p C2-C7 fixation; not lifting greater than a gallon of milk: Left AFO      Subjective:  Patient arrives with 2WW. She denies any changes or falls since last visit. Pt notes she still wants to work on walking with quad cane. She is still using the 2WW at home but does occasionally walk with holding on to her counters.        Pain:  [x] Yes  [] No Location: Neck down to (B) Shoulders/UEs to knuckles in hands (B) LE's int feet and toes    Pain Rating: (0-10 scale) 5/10  Pain altered Tx:  [x] No  [] Yes  Action:  Comments:       Objective:  Modalities:    Exercises:  Exercise Reps/ Time Weight/ Level Completed  Today Comments   nustep  5' Level 4 x B UE/LE           MAT LEVEL     Elevated with large wedge and 2 pillows    SKTC  2x30s ea  x  L ONLY    HS stretch  2x30s ea  x  L ONLY    Figure 4 stretch  2x30s ea  x L ONLY    glute bridges with band  x15 EO   x15 EC  Red Felt increased challenge with eyes closed due to proprioceptive deficits    Isolated hooklying clams   2x15 ea  Green    Isolated makes none moving limb stabilize                  Standing       Resisted STS    2x10   Green   From mat table    Sit to stand without hands  2x8  x CGA to Miryam  in front to encourage anterior lean; cues for eccentric control    Step ups Fwd/Lat 10x2 6\", 2#  unilateral UE support    3 way hip w/ eyes closed  10x ea  2 sets 3# aw  With BUE support, eyes closed to build proprioceptive input     Slow Controlled Marches 2-3\" to raise and lower 10x  2 sets  3# B LE's     Forced WB on 4\" step + 1/2 ball 2x30\" each   Encouraged light touch; WB L LE more difficult than R LE   TKE  20x  red     Slow high march w/ opposite UE raise 10x2 ea 3# L LE     Heel/toe raises  10x2      Semi tandem Stance  3x30\"   NO UE Support   EC Foam feet Normal Stance 2x30\"    NO UE Support   EO/ EC Foam Feet touching 3x30''   NO UE support  Alternated EO/EC   Standing on foam with press forward & press up  2x10 4# ball      Feet together Eye Closed Firm surface 2x30\"    NO UE Support- VERY difficult    Toe taps 6\" step + 1/2 ball 10x2 ea 2#  Unilateral UE support           GAIT TRAINING        Ambulation with SBQC  2x75ft  x CGA   ambulation with quad cane and ankle wt  2x75ft 3# aw  x           Other:    Specific Instructions for next treatment: Continue to challenge LE strengthening, stability and coordination work; challenge patient to decrease visual reliance for increased proprioceptive input. Assessment:    [x] Progressing toward goals. Began session with nustep for ROM and strengthening prep for sessions. COmpelted LLE stretching to improve tolerance to mobility interventions. Feels good improvement post stretching. Focused majority of session on ambulation with small based quad cane. Completed with mainly CGA with pt having occasional catching of L foot but does correct with appropriate strategies.  Added ankle weights for increased challenge from a strengthening perspective and increase in sensory feedback. Pt overall doing well with quad cane. Educated on quad cane is appropriate for now but pt does note she would like to be able to use a SPC. Ended session with sit to stands from a regular chair to strengthen and increase power. Completed without UE support with CGA to Miryam provided. Limited use of momentum to improve the activation and power aspects of the movement. Cues to focus on the slow eccentric control for additional strengthening. Able to control initially about 50% but by end of reps controls for about 75% of lowering. Feel that the last 25% is limited due to AFO limiting anterior tibial translation. Will continue to work on this in future sessions. Overall pt is appropriately fatigued for session. [] No change. [] Other:    [x] Patient would continue to benefit from skilled physical therapy services in order to: improve strength,mobiltiy and motor control that impacts balance and mobility. GOALS -- assessed 6/15/22  STG: (to be met in 10 treatments)  1. Pt will improve score on ESCOBEDO balance scale by >/=6 points to improve overall balance stability and decrease fall risk with mobility. - 6/15: MET -- exceeds   2. Pt will demonstrate >10 SLS stability with light UE support to safely allow pt to enter/exit her tub shower. - 6/15: partially met -- light UE support for RLE, still difficulty on the L   3. Pt will improve time on TUG to <20s with LRAD at mod IND level to demonstrate decreased fall risk. - 6/15: progressing -- 10 second improvement to 25.5s   4. Pt will increase gait speed to >0.8 m/s  With LRAD at mod I level to demonstrate decreased fall risk and optimize mobility to at community level. - 6/15: limited increase in speed but pt is now mod IND with 2WW   5.  Pt will be able to manage her walker up 1 step without instability to improve independence with access to her home.  - 6/15: progressing   6. Pt will improve time on 5xSTS to <15 seconds with use of 1 UE to decrease fall risk and increase functional capacity for mobility. - 6/15: completes with unilateral UE support this date but time is increased. LTG: (to be met in 20 treatments)  1. Pt will improve score on ESCOBEDO balance scale by >/= 45/56 points  to improve overall balance stability and decrease fall risk with mobility. - 6/15: progressing -- 33/56    2. Pt will increase strength of all major muscle groups of the LEs to 4/5 or greater demonstrating improved strength needed to maximize safety and efficiency with mobility tasks such as gait, transfers and stairs. - 6/15: progressing   3. Pt will be independent with home program addressing strength, flexibility and balance to maximize gains made in therapy to improve overall functional capacity for mobility.     -6/15: progressing -- will continue to update in POC   4. Pt will improve score on OPTIMAL to 46/90 to demonstrate functional improvements with ADLs.   -6/15: will assess at a later time    5. Pt will report no falls for x6 weeks demonstrating improved safety with mobility and implementation of fall prevention strategies. - 6/15: meeting currently with no falls noted   * further goals may be established based on additional assessments or progress*     Patient stated Goals: to walk without a walker, improve balance     Pt. Education:  [x] Yes  [] No  [x] Reviewed Prior HEP/Ed  Method of Education: [x] Verbal  [x] Demo  [] Written  Comprehension of Education:  [x] Verbalizes understanding. [] Demonstrates understanding. [] Needs review. [] Demonstrates/verbalizes HEP/Ed previously given.      Plan: [x] Continue per POC    [] Other:          Treatment Charges: Mins Units   []  Modalities     [x]  Ther Exercise 15 1   []  Manual Therapy     []  Ther Activities     []  Aquatics     []  Neuromuscular     [] Vasocompression     [x] Gait Training 25 2   [] Dry needling        [] 1 or 2 muscles        [] 3 or more muscles     []  Other     Total Treatment time 40 3   * KX modifier needed*     Time In: 1048am        Time Out: 11:28pm    Electronically signed by:  Danielle Brooke PT

## 2022-07-05 ENCOUNTER — HOSPITAL ENCOUNTER (OUTPATIENT)
Dept: OCCUPATIONAL THERAPY | Age: 62
Setting detail: THERAPIES SERIES
Discharge: HOME | End: 2022-07-05
Payer: MEDICARE

## 2022-07-05 ENCOUNTER — HOSPITAL ENCOUNTER (OUTPATIENT)
Dept: PHYSICAL THERAPY | Age: 62
Setting detail: THERAPIES SERIES
Discharge: HOME | End: 2022-07-05
Payer: MEDICARE

## 2022-07-05 PROCEDURE — 97110 THERAPEUTIC EXERCISES: CPT

## 2022-07-05 PROCEDURE — 97116 GAIT TRAINING THERAPY: CPT

## 2022-07-05 ASSESSMENT — PAIN DESCRIPTION - PAIN TYPE: TYPE: CHRONIC PAIN

## 2022-07-05 ASSESSMENT — PAIN SCALES - GENERAL: PAINLEVEL_OUTOF10: 5

## 2022-07-05 ASSESSMENT — PAIN DESCRIPTION - FREQUENCY: FREQUENCY: CONTINUOUS

## 2022-07-05 ASSESSMENT — PAIN DESCRIPTION - LOCATION: LOCATION: NECK

## 2022-07-05 NOTE — FLOWSHEET NOTE
[facility] Outpatient Physical Therapy   [address]   Phone: [phone]   Fax: [phone]    Physical Therapy Daily Treatment Note      Date:  2022  Patient Name:  Lanelle Apley    :  1960  MRN: [id number]  Physician: Faith Robledo MD                            Insurance: Medicare - based on [place] -- [address] [place] [address] Diagnosis:   G46.927W (ICD-10-CM) - Spinal cord injury, cervical region, sequela ([facility] [address] [address]   G82.54 (ICD-10-CM) - Incomplete quadriplegia at C5-6 level ([facility] [address]   G83.4 (ICD-10-CM) - Cauda equina syndrome ([facility] [address]   Rehab Codes: R25 pain , R53.1 weakness, Z74.0 reduced mobility, R29.6 high fall risk, R27.8 lack of coordination, R26.89 gait abnormality   Date of symptom onset: 21 -- cauda equina; 22 Cervical surgeries   Next 's appt. : 22 with Neurosurgery; 22 with PMR Dr. Carmen Hutchinson   Total Visits: [phone]  Cx/Ns:0/0   Date of initial visit: 22        Date of PN: 6/15/22 (visit 10)    PN needed by visit 20     Precautions: C-collar must be worn due to s/p C2-C7 fixation; not lifting greater than a gallon of milk: Left AFO      Subjective:  Patient arrives with 2WW. She denies any changes or falls since last visit. Patient states she tries to walk around house often with use of counter tops/furniture or 2WW. Patient reports she did not eat prior to therapy appointments so feels a little fatigued and hungry at start of session. Patient states she wants to continue sessions as she is not diabetic so will be fine not eating until she gets home.      Pain:  [x] Yes  [] No Location: Neck down to (B) Shoulders/UEs to knuckles in hands (B) LE's int feet and toes    Pain Rating: (0-10 scale) 5/10  Pain altered Tx:  [x] No  [] Yes  Action:  Comments:       Objective:  Modalities:    Exercises:  Exercise Reps/ Time Weight/ Level Completed  Today Comments   nustep  5' Level 4 x B UE/LE           MAT LEVEL     Elevated with large wedge and 2 pillows    SKTC  2x30s ea  x  L ONLY    HS stretch  2x30s ea  x  L ONLY    Figure 4 stretch  2x30s ea  x L ONLY    glute bridges with band  x15 EO   x15 EC  Red   Felt increased challenge with eyes closed due to proprioceptive deficits    Isolated hooklying clams   2x15 ea  Green    Isolated makes none moving limb stabilize                  Standing       Resisted STS    2x10   Green   From mat table    Sit to stand without hands  10x  x CGA to [place]  in front to encourage anterior lean; cues for eccentric control   Less amount of reps this date as patient became very tired and noted tremors from being hungry,   Step ups Fwd/Lat 10x2 6\", 2#  unilateral UE support    3 way hip w/ eyes closed  10x ea  2 sets 3# aw x With BUE support, eyes closed to build proprioceptive input     Slow Controlled Marches 2-3\" to raise and lower 10x  2 sets  3# B LE's     Forced WB on 4\" step + 1/2 ball 2x30\" each   Encouraged light touch; WB L LE more difficult than R LE   TKE  20x  red     Slow high march w/ opposite UE raise 10x2 ea 3# L LE     Heel/toe raises  10x2      Semi tandem Stance  3x30\"   NO UE Support   EC Foam feet Normal Stance 2x30\"    NO UE Support   EO/ EC Foam Feet touching 3x30''   NO UE support  Alternated EO/EC   Standing on foam with press forward & press up  2x10 4# ball      Feet together Eye Closed Firm surface 2x30\"    NO UE Support- VERY difficult    Toe taps 6\" step + 1/2 ball 10x2 ea 2#  Unilateral UE support           GAIT TRAINING        Ambulation with [employer] [place]  2x75ft      ambulation with quad cane and ankle wt  [id number] 3# aw  x CGA          Other:    Specific Instructions for next treatment: Continue to challenge LE strengthening, stability and coordination work; challenge patient to decrease visual reliance for increased proprioceptive input. Assessment:    [x] Progressing toward goals. Began session with nustep for ROM and strengthening prep for sessions.  Followed by supine stretches to improve mobility in B LE's in order to have better overall gait mechanics, and lessen any stiffness/pain. Increased time required for supine and sit to stand exercise secondary to low energy levels. Post stretches patient intermittently required seated rest breaks due to feeling fatigued, and hungry. Patient noted tremors post sit to stand exercise due to hunger so in order to maximize therapy time gait training/ambulation with quad cane was completed to back room area in which patient was able to eat something/rest in order to decrease overall tremors and improve energy levels. Encouraged patient to eat and/or drink something prior to therapy appointments to avoid today's symptoms, and loss of therapy time. Completed long distances for gait training this date with focus on postural awareness,  Heel strike, and proper pacing to avoid LOB. Patient notably fatigued at end of session but still able to ambulate out to lobby/parking lot using 2WW.      [] No change. [] Other:    [x] Patient would continue to benefit from skilled physical therapy services in order to: improve strength,mobiltiy and motor control that impacts balance and mobility. GOALS -- assessed 6/15/22  STG: (to be met in 10 treatments)  1. Pt will improve score on ESCOBEDO balance scale by >/=6 points to improve overall balance stability and decrease fall risk with mobility. - 6/15: MET -- exceeds   2. Pt will demonstrate >10 SLS stability with light UE support to safely allow pt to enter/exit her tub shower. - 6/15: partially met -- light UE support for RLE, still difficulty on the L   3. Pt will improve time on TUG to <20s with LRAD at mod IND level to demonstrate decreased fall risk. - 6/15: progressing -- 10 second improvement to 25.5s   4. Pt will increase gait speed to >0.8 m/s  With LRAD at mod I level to demonstrate decreased fall risk and optimize mobility to at community level.     - 6/15: limited increase in speed but pt is now mod IND with 2WW   5. Pt will be able to manage her walker up 1 step without instability to improve independence with access to her home. - 6/15: progressing   6. Pt will improve time on 5xSTS to <15 seconds with use of 1 UE to decrease fall risk and increase functional capacity for mobility. - 6/15: completes with unilateral UE support this date but time is increased. LTG: (to be met in 20 treatments)  1. Pt will improve score on ESCOBEDO balance scale by >/= 45/56 points  to improve overall balance stability and decrease fall risk with mobility. - 6/15: progressing -- 33/56    2. Pt will increase strength of all major muscle groups of the LEs to 4/5 or greater demonstrating improved strength needed to maximize safety and efficiency with mobility tasks such as gait, transfers and stairs. - 6/15: progressing   3. Pt will be independent with home program addressing strength, flexibility and balance to maximize gains made in therapy to improve overall functional capacity for mobility.     -6/15: progressing -- will continue to update in POC   4. Pt will improve score on OPTIMAL to 46/90 to demonstrate functional improvements with ADLs.   -6/15: will assess at a later time    5. Pt will report no falls for x6 weeks demonstrating improved safety with mobility and implementation of fall prevention strategies. - 6/15: meeting currently with no falls noted   * further goals may be established based on additional assessments or progress*     Patient stated Goals: to walk without a walker, improve balance     Pt. Education:  [x] Yes  [] No  [x] Reviewed Prior HEP/Ed  Method of Education: [x] Verbal  [x] Demo  [] Written  Comprehension of Education:  [x] Verbalizes understanding. [] Demonstrates understanding. [] Needs review. [] Demonstrates/verbalizes HEP/Ed previously given.      Plan: [x] Continue per POC    [] Other:          Treatment Charges: Mins Units   []  Modalities     [x]  Ther Exercise 23 2   []  Manual Therapy     []  Ther Activities     []  Aquatics     []  Neuromuscular     [] Vasocompression     [x] Gait Training 20 1   [] Dry needling        [] 1 or 2 muscles        [] 3 or more muscles     []  Other     Total Treatment time 43 3   * KX modifier needed*     Time In: 1015am       Time Out: 11:04am  6 minutes unbilled time due to long seated rest break for patient to eat in order to continue in PT.      Electronically signed by:  Belén Sahu PTA

## 2022-07-05 NOTE — PROGRESS NOTES
[address]         Phone: [phone]       Fax: [phone]     Occupational Therapy Daily Treatment note     Occupational Therapy: Daily Note   Patient: Perla Tay (6 y.o. female)   Examination Date: 2022  No data recorded  Progress Note Counter: MD appt 22     :  1960 # of Visits since [facility]:   15   MRN: [id number]  CSN: [id number] Start of Care Date:    Insurance: Payor: MEDICARE / Plan: MEDICARE PART A AND B / Product Type: *No Product type* /   Insurance ID: [id number] - (Medicare) Secondary Insurance (if applicable): Insurance Information:     Referring Physician: Kevyn Soto MD     PCP: Stefany Dial MD Visits to Date/Visits Approved: 15 / 20    No Show/Cancelled Appts:   /       Medical Diagnosis: Unspecified injury at unspecified level of cervical spinal cord, sequela [S14.109S]  Quadriplegia, C5-C7 incomplete [G82.54]  Cauda equina syndrome [G83.4]    Treatment Diagnosis: bilateral UE weakness & impaired coordination, bilateral shoulder stiffness, impaired sensation - hands lt more than rt  (M62.81, R27.9, M25.611, M25.612,R20.2        SUBJECTIVE EXAMINATION   Pain Level: Pain Screening  Patient Currently in Pain: Yes  Pain Assessment: 0-10  Pain Level: 5  Best Pain Level: 5  Post Treatment Pain Level: 5  Pain Type: Chronic pain  Pain Location: Neck  Pain Radiating Towards: Radiates from neck down to her feet  Pain Frequency: Continuous    Patient Comments: Subjective: \"Yeah, it's getting a little easier.   Except that pinky finger just doesn't want to cooperate\" (referring to key pegs with L hand)          OBJECTIVE EXAMINATION   Restrictions: Restrictions/Precautions: Fall Risk; General Precautions       TREATMENT     Exercises:     Treatment Reasoning    OT Exercise 1: PROM B UE,shoulders, wrist/hand, finger blocking exercises bilateral hands( PIP flex/extension 25x, DIP flex/extension), stretch of left hand/wrist  OT Exercise 2: B UE shoulder( flexion/extension at 90 degrees 1# 15x, shoulder abduction/adduction at 90 degrees 1# 15x),Bilateral 1# 15x each (forearm supination/pronation, wrist flexion/extension (palm down, palm up)  OT Exercise 3: yellow 6# theraband flexbar 20x each way using bilateral hand: twist, make U, make upside down U  OT Exercise 10: BTE #502 B wrists for flexion/extension, T = 4 lbs. , 60 seconds (rt hand, lt hand)  OT Exercise 11: BTE #162 hand gripper, T = 15 in lbs, 90 seconds(rt hand/lt hand);  pinch , T = 11  in lbs, 60 seconds. (rt hand/lt hand) (Increased pinch resistance by 1 in lbs)  OT Exercise 12: graded clothespins: place/remove  6 yellow 1#, 6 red 2#, 6 green 4#  ( using rt hand, using lt hand) - pt sitting  OT Exercise 16: press down cone in putty: 12x each way for rt hand/lt hand  (holding small end cone, holding large end cone) (Increased reps by 2)  OT Exercise 17: key pegs : using lt hand place all 25 pegs in board, them remove pegs & hold in lt hand.  Limitations addressed: Strength,Coordination,Flexibility,Activity tolerance   Therapist provided: Verbal cuing   Progressed: Resistance,Repetitions                      ASSESSMENT     Assessment: Assessment: OT treatment focused on increasing ROM, strength, and coordination of BUE for improved functional performance. Pt reports that she feels she is making progress, but that her L small finger is still \"not cooperating\". Pt tolerated exercises with no increased c/o pain and minor reports of fatigue. Pt able to continue after short rest break. See OT exercise sheet for detailed report.   Performance deficits / Impairments: Decreased ROM,Decreased strength,Decreased coordination,Decreased fine motor control,Decreased high-level IADLs,Decreased sensation    Post-Treatment Pain Level: 5    Prognosis: Good    Activity Tolerance: Patient tolerated treatment well TREATMENT PLAN   REQUIRES OT FOLLOW-UP: Yes  Type: Outpatient       Therapy Time  Individual Time In: 4153  Individual Time Out: 2969  Minutes: 48          Electronically signed by Chet Lee  on 7/5/2022 at 12:12 PM       Treatment Charges: Mins Units Time In/Out   [] Evaluation       []  Low       []  Moderate       []  High      []  Electrical Stim      []  Iontophoresis      []  Paraffin      []  Ultrasound        []  Masage      []  Neuromuscular Re-ed      []  ADL      _  Ther Exercise 48 3 [id number]   []  Ther Activities      []  Neuro Re-Ed      []  Splinting      []  Other      Total Treatment time 48 min 3          POC NOTE

## 2022-07-06 ENCOUNTER — OFFICE VISIT (OUTPATIENT)
Dept: NEUROSURGERY | Age: 62
End: 2022-07-06
Payer: MEDICARE

## 2022-07-06 VITALS
HEART RATE: 92 BPM | SYSTOLIC BLOOD PRESSURE: 138 MMHG | BODY MASS INDEX: 26.21 KG/M2 | OXYGEN SATURATION: 97 % | WEIGHT: 138.8 LBS | HEIGHT: 61 IN | DIASTOLIC BLOOD PRESSURE: 79 MMHG | TEMPERATURE: 98 F

## 2022-07-06 DIAGNOSIS — M48.02 STENOSIS OF CERVICAL SPINE WITH MYELOPATHY (HCC): ICD-10-CM

## 2022-07-06 DIAGNOSIS — G99.2 STENOSIS OF CERVICAL SPINE WITH MYELOPATHY (HCC): ICD-10-CM

## 2022-07-06 DIAGNOSIS — M43.8X2 SWAN NECK DEFORMITY OF CERVICAL SPINE: Primary | ICD-10-CM

## 2022-07-06 DIAGNOSIS — G81.14 LEFT SPASTIC HEMIPLEGIA (HCC): ICD-10-CM

## 2022-07-06 PROCEDURE — G8427 DOCREV CUR MEDS BY ELIG CLIN: HCPCS | Performed by: NEUROLOGICAL SURGERY

## 2022-07-06 PROCEDURE — 99024 POSTOP FOLLOW-UP VISIT: CPT | Performed by: NEUROLOGICAL SURGERY

## 2022-07-06 PROCEDURE — 1036F TOBACCO NON-USER: CPT | Performed by: NEUROLOGICAL SURGERY

## 2022-07-06 PROCEDURE — G8419 CALC BMI OUT NRM PARAM NOF/U: HCPCS | Performed by: NEUROLOGICAL SURGERY

## 2022-07-06 PROCEDURE — 3017F COLORECTAL CA SCREEN DOC REV: CPT | Performed by: NEUROLOGICAL SURGERY

## 2022-07-06 NOTE — PROGRESS NOTES
[address]  [facility] [address], [address] [facility] [address]  Dept: [phone]    Patient:  Reema Welch  YOB: 1960  Date: 7/6/22    The patient is a 62 y.o. female who presents today for consult of the following problems:     Chief Complaint   Patient presents with    Follow-up             HPI:     Reema Welch is a 62 y.o. female on whom neurosurgical consultation was requested by Frieda Sommer MD for management of Cervical deformity as well as spinal stenosis with myelopathy. Significant provement in bilateral upper extremities with almost complete resolution of weakness. Still with some paresthesias of the left hand. Still with some weakness of left dorsiflexion but has been progressing well with therapy and the use of a walker. no falls. Using walker at al times.  .      History:     Past Medical History:   Diagnosis Date    Anxiety     Arthritis     At maximum risk for fall 12/29/2021    caudal equina syndrome and cervical stenosis    Cancer (HCC)     right breast    Cauda equina syndrome ([address] 12/29/2021    neuropathy, mobility difficulty, incontinance    Cervical stenosis of spine 12/29/2021    GERD (gastroesophageal reflux disease)     History of stomach ulcers     Hypertension     Dr. Simone Lion    Hypothyroidism     Lumbar neuritis     Post laminectomy syndrome     Spinal deformity 11/2021    cervical and lumbar    Thyroid disease     Uses wheelchair     Wears dentures     Wellness examination     Dr. Simone Lion     Past Surgical History:   Procedure Laterality Date    BACK SURGERY      lower back x 3, cervical- x 1: C4- C6, 11/4/2014     BREAST SURGERY Right     mastectomy with reconstruction    CERVICAL FUSION N/A 4/12/2022    C5 CORPECTOMY, USE OF INTRAOPERATIVE CERVICAL TRACTION performed by Lavonne Fernandez DO at [email or web address] 4/12/2022    POSTERIOR FIXATION C2-C7 performed by Margarette Hodgkin, DO at [facility]  about 2018    HERNIA REPAIR Bilateral     inguinal    HYSTERECTOMY, TOTAL ABDOMINAL (CERVIX REMOVED)      LUMBAR SPINE SURGERY N/A 12/30/2021    LUMBAR LAMINECTOMY L2-S1 performed by Margarette Hodgkin, DO at [facility], [facility]      OTHER SURGICAL HISTORY  04/12/2022    C5 CORPECTOMY, USE OF INTRAOPERATIVE CERVICAL TRACTION (N/A Spine Cervical)      Family History   Problem Relation Age of Onset    Hypertension Mother     Heart Disease Mother     Cancer Mother      Current Outpatient Medications on File Prior to Visit   Medication Sig Dispense Refill    busPIRone (BUSPAR) 30 MG tablet TAKE ONE TABLET BY MOUTH TWICE A DAY 60 tablet 3    HYDROcodone-acetaminophen (NORCO)  MG per tablet Take 1 tablet by mouth every 6 hours as needed for Pain.  lisinopril (PRINIVIL;ZESTRIL) 5 MG tablet Take 1 tablet by mouth daily 30 tablet 3    dilTIAZem (CARDIZEM CD) 180 MG extended release capsule Take 1 capsule by mouth daily 30 capsule 3    fluticasone (FLONASE) 50 MCG/ACT nasal spray 1 spray by Each Nostril route daily 16 g 3    docusate sodium (COLACE) 100 MG capsule Take 2 capsules by mouth 2 times daily 60 capsule 1    baclofen (LIORESAL) 10 MG tablet Take 1 tablet by mouth 4 times daily 120 tablet 1    levothyroxine (SYNTHROID) 100 MCG tablet Take 1 tablet by mouth Daily 30 tablet 3    pantoprazole (PROTONIX) 40 MG tablet Take 1 tablet by mouth daily 30 tablet 3    ferrous sulfate (IRON 325) 325 (65 Fe) MG tablet Take 1 tablet by mouth 2 times daily (with meals) 30 tablet 3    Vitamin D, Ergocalciferol, 50 MCG (2000 UT) CAPS TAKE ONE CAPSULE BY MOUTH DAILY 90 capsule 3    methadone (DOLOPHINE) 10 MG tablet Take 10 mg by mouth 2 times daily. No current facility-administered medications on file prior to visit.      Social History     Tobacco Use    Smoking status: Former Smoker Packs/day: 0.50     Years: 30.00     Pack years: 15.00     Quit date:      Years since quittin.5    Smokeless tobacco: Never Used   Vaping Use    Vaping Use: Every day    Substances: Nicotine   Substance Use Topics    Alcohol use: Yes     Alcohol/week: 0.0 standard drinks     Comment: weekend at times    Drug use: Yes     Types: Prescription     Comment: Methadone from [facility]       Allergies   Allergen Reactions    Latex Hives, Itching, Dermatitis and Rash    Kiwi Extract      Face swelling, some difficulty breathing       Review of Systems  ROS: weakness L leg. No neck pain    Physical Exam:      BP (!) 144/80 (Site: Left Upper Arm, Position: Sitting)   Pulse 92   Temp 98 °F (36.7 °C) (Temporal)   Ht 5' 1\" (1.549 m)   Wt 138 lb 12.8 oz (63 kg)   SpO2 97%   BMI 26.23 kg/m²   Estimated body mass index is 26.23 kg/m² as calculated from the following:    Height as of this encounter: 5' 1\" (1.549 m). Weight as of this encounter: 138 lb 12.8 oz (63 kg). General:  Mitchell Martinez is a 62y.o. year old female who appears her stated age. HEENT: Normocephalic atraumatic. Neck supple. Chest: regular rate; pulses equal. Equal chest rise and fall  Abdomen: Soft nondistended.    Ext: DP equal with good capillary refill  Neuro    Mentation  Appropriate affect   oriented    Cranial Nerves:   Pupils equal and reactive to light  Extraocular motion intact  Face symmetric  No dysarthria  v1-3 sensation symmetric, masseter tone symmetric  Hearing symmetric and intact to finger rub    Sensation:   Diminished sensation L dorsum    Motor  L deltoid 5/5; R deltoid 5/5  L biceps 5/5; R biceps 5/5  L triceps 5/5; R triceps 5/5  L wrist extension 5/5; R wrist extension 5/5  L intrinsics 5/5; R intrinsics 5/5     L DF and EHL 4  L intrinsics 4+    Reflexes  L Brachioradialis 2+/4; R brachioradialis 2+/4  L Biceps 2+/4; R Biceps 2+/4  L Triceps 2+/4; R Triceps 2+/4  L Patellar 2+/4: R Patellar 2+/4  L Achilles 2+/4; R Achilles 2+/4    hoffmans L: neg  hoffmans R: neg  Clonus L: neg  Clonus R: neg  Babinski L: up  Babinski R; up    Studies Review:     Ct and T spine without hardware malfunction. Assessment and Plan:      1. Sondheimer neck deformity of cervical spine    2. Left spastic hemiplegia (HCC)    3. Stenosis of cervical spine with myelopathy (HCC)          Plan: Patient hyperkyphotic thoracic spine compensatory extension of the cervical spine. Overall localizes cervical kyphosis corrected and no evidence of persistent stenosis. Significant improvement overall in terms of myelopathy and function and she has progressed using a cane intermittently of therapy with significant improvement of upper extremity function and no other recent falls. Followup: No follow-ups on file. Prescriptions Ordered:  No orders of the defined types were placed in this encounter. Orders Placed:  Orders Placed This Encounter   Procedures    XR CERVICAL SPINE (2-3 VIEWS)     Standing AP and lateral cervical spine. Include swimmers view for below C6     Standing Status:   Future     Standing Expiration Date:   7/6/2023     Order Specific Question:   Reason for exam:     Answer:   postop        Electronically signed by Adilson Hicks DO on 7/6/2022 at 11:57 AM    Please note that this chart was generated using voice recognition Dragon dictation software. Although every effort was made to ensure the accuracy of this automated transcription, some errors in transcription may have occurred.

## 2022-07-07 ENCOUNTER — HOSPITAL ENCOUNTER (OUTPATIENT)
Dept: OCCUPATIONAL THERAPY | Age: 62
Setting detail: THERAPIES SERIES
Discharge: HOME | End: 2022-07-07
Payer: MEDICARE

## 2022-07-07 ENCOUNTER — HOSPITAL ENCOUNTER (OUTPATIENT)
Dept: PHYSICAL THERAPY | Age: 62
Setting detail: THERAPIES SERIES
Discharge: HOME | End: 2022-07-07
Payer: MEDICARE

## 2022-07-07 PROCEDURE — 97110 THERAPEUTIC EXERCISES: CPT

## 2022-07-07 PROCEDURE — 97112 NEUROMUSCULAR REEDUCATION: CPT

## 2022-07-07 ASSESSMENT — PAIN DESCRIPTION - ORIENTATION: ORIENTATION: LEFT;RIGHT

## 2022-07-07 ASSESSMENT — PAIN DESCRIPTION - LOCATION: LOCATION: LEG;ARM;NECK

## 2022-07-07 ASSESSMENT — PAIN SCALES - GENERAL: PAINLEVEL_OUTOF10: 5

## 2022-07-07 ASSESSMENT — PAIN DESCRIPTION - PAIN TYPE: TYPE: ACUTE PAIN;CHRONIC PAIN

## 2022-07-07 ASSESSMENT — PAIN DESCRIPTION - DESCRIPTORS: DESCRIPTORS: TIGHTNESS

## 2022-07-07 NOTE — FLOWSHEET NOTE
[address] [facility] Outpatient Physical Therapy   [id number] [facility] [address]   Phone: [phone]   Fax: [phone]    Physical Therapy Daily Treatment Note      Date:  2022  Patient Name:  Lanelle Apley    :  1960  MRN: [id number]  Physician: Faith Robledo MD                            Insurance: Medicare - based on [place] -- [address] [facility] Diagnosis:   A56.350V (ICD-10-CM) - Spinal cord injury, cervical region, sequela ([facility] [address]   G82.54 (ICD-10-CM) - Incomplete quadriplegia at C5-6 level ([facility] [address]   G83.4 (ICD-10-CM) - Cauda equina syndrome ([facility] [address]   Rehab Codes: R25 pain , R53.1 weakness, Z74.0 reduced mobility, R29.6 high fall risk, R27.8 lack of coordination, R26.89 gait abnormality   Date of symptom onset: 21 -- cauda equina; 22 Cervical surgeries   Next 's appt. : 22 with Neurosurgery; 22 with PMR Dr. aCrmen Hutchinson   Total Visits:   Cx/Ns:0/0   Date of initial visit: 22        Date of PN: 6/15/22 (visit 10)    PN needed by visit 20     Precautions: C-collar must be worn due to s/p C2-C7 fixation; not lifting greater than a gallon of milk: Left AFO      Subjective:  Patient arrives 5min late with 2WW and states she saw her doctor yesterday and was cleared to start weaning the cervical collar, but does not know if she was given a weaning schedule or not. Patient also states she wants to discuss with the primary PT and add trunk strengthening to her goals.     Pain:  [x] Yes  [] No Location: Neck down to (B) Shoulders/UEs to knuckles in hands (B) LE's int feet and toes    Pain Rating: (0-10 scale) 5/10  Pain altered Tx:  [x] No  [] Yes  Action:  Comments:       Objective:  Modalities:    Exercises:  Exercise Reps/ Time Weight/ Level Completed  Today Comments   nustep  5' Level 4 x B UE/LE           MAT LEVEL     Elevated with large wedge and 2 pillows    SKTC  2x30s ea  x  L ONLY    HS stretch  2x30s ea  x  L ONLY    Figure 4 stretch  2x30s ea   L ONLY glute bridges with band  x15 EO   x15 EC  Red  x Felt increased challenge with eyes closed due to proprioceptive deficits    Isolated hooklying clams   2x15 ea  Green   x Isolated makes non moving limb stabilize                  Standing       Resisted STS    2x10   Green  x From mat table    Sit to stand without hands  2x10  x CGA to Miryam  in front to encourage anterior lean; cues for eccentric control   10x at lowest mat, 10x at chair level     Step ups Fwd/Lat 10x2 6\", 2#  unilateral UE support    3 way hip w/ eyes closed  10x ea  2 sets 3# aw  With BUE support, eyes closed to build proprioceptive input     Slow Controlled Marches 2-3\" to raise and lower 10x  2 sets  3# B LE's     Forced WB on 4\" step + 1/2 ball 2x30\" each   Encouraged light touch; WB L LE more difficult than R LE   TKE  20x  red     Slow high march w/ opposite UE raise 10x2 ea 3# L LE     Heel/toe raises  10x2      Semi tandem Stance  3x30\"   NO UE Support   EC Foam feet Normal Stance 2x30\"    NO UE Support   EO/ EC Foam Feet touching 3x30''   NO UE support  Alternated EO/EC   Standing on foam with press forward & press up  2x10 4# ball      Feet together Eye Closed Firm surface 2x30\"    NO UE Support- VERY difficult    Toe taps 6\" step + 1/2 ball 10x2 ea 2#  Unilateral UE support           GAIT TRAINING        Ambulation with [employer] [place]  2x75ft      ambulation with quad cane and ankle wt  [id number] 3# aw   CGA          Other:    Specific Instructions for next treatment: Continue to challenge LE strengthening, stability and coordination work; challenge patient to decrease visual reliance for increased proprioceptive input. Assessment:    [x] Progressing toward goals. 7/7: Initiated session with nustep to improve joint mobility and strengthening. Continued with supine stretching and strengthening with patient demonstrating improved tolerance and stability with bridging exercise. Continued focus on STS exercises without use of UE.  Began with lowest mat in gym set to the lowest setting, patient requiring Miryam for standing. Once one set was complete, the mat was raised to a chair level and patient required only CGA for second set. Continued with resisted STS for strengthening with functional movement. Ended session with review of exercises and stretching pt should be doing at home and walked with pt to her OT appointment. [] No change. [] Other:    [x] Patient would continue to benefit from skilled physical therapy services in order to: improve strength,mobiltiy and motor control that impacts balance and mobility. GOALS -- assessed 6/15/22  STG: (to be met in 10 treatments)  1. Pt will improve score on ESCOBEDO balance scale by >/=6 points to improve overall balance stability and decrease fall risk with mobility. - 6/15: MET -- exceeds   2. Pt will demonstrate >10 SLS stability with light UE support to safely allow pt to enter/exit her tub shower. - 6/15: partially met -- light UE support for RLE, still difficulty on the L   3. Pt will improve time on TUG to <20s with LRAD at mod IND level to demonstrate decreased fall risk. - 6/15: progressing -- 10 second improvement to 25.5s   4. Pt will increase gait speed to >0.8 m/s  With LRAD at mod I level to demonstrate decreased fall risk and optimize mobility to at community level. - 6/15: limited increase in speed but pt is now mod IND with 2WW   5. Pt will be able to manage her walker up 1 step without instability to improve independence with access to her home. - 6/15: progressing   6. Pt will improve time on 5xSTS to <15 seconds with use of 1 UE to decrease fall risk and increase functional capacity for mobility. - 6/15: completes with unilateral UE support this date but time is increased. LTG: (to be met in 20 treatments)  1. Pt will improve score on ESCOBEDO balance scale by >/= 45/56 points  to improve overall balance stability and decrease fall risk with mobility.    - 6/15: progressing -- 33/56    2. Pt will increase strength of all major muscle groups of the LEs to 4/5 or greater demonstrating improved strength needed to maximize safety and efficiency with mobility tasks such as gait, transfers and stairs. - 6/15: progressing   3. Pt will be independent with home program addressing strength, flexibility and balance to maximize gains made in therapy to improve overall functional capacity for mobility.     -6/15: progressing -- will continue to update in POC   4. Pt will improve score on OPTIMAL to 46/90 to demonstrate functional improvements with ADLs.   -6/15: will assess at a later time    5. Pt will report no falls for x6 weeks demonstrating improved safety with mobility and implementation of fall prevention strategies. - 6/15: meeting currently with no falls noted   * further goals may be established based on additional assessments or progress*     Patient stated Goals: to walk without a walker, improve balance     Pt. Education:  [x] Yes  [] No  [x] Reviewed Prior HEP/Ed  Method of Education: [x] Verbal  [x] Demo  [] Written  Comprehension of Education:  [x] Verbalizes understanding. [] Demonstrates understanding. [] Needs review. [] Demonstrates/verbalizes HEP/Ed previously given.      Plan: [x] Continue per POC    [] Other:          Treatment Charges: Mins Units   []  Modalities     [x]  Ther Exercise 38 3   []  Manual Therapy     []  Ther Activities     []  Aquatics     []  Neuromuscular     [] Vasocompression     [] Gait Training     [] Dry needling        [] 1 or 2 muscles        [] 3 or more muscles     []  Other     Total Treatment time 38 3   * KX modifier needed*     Time In: 1050am       Time Out: 11:28am      Electronically signed by:  Kentrell Jacobsen PTA

## 2022-07-07 NOTE — PROGRESS NOTES
[address]         Phone: [phone]       Fax: [phone]     Occupational Therapy Daily Treatment note     Occupational Therapy: Daily Note   Patient: Delicia uMnoz (45 y.o. female)   Examination Date: 2022  No data recorded  Progress Note Counter: MD appt 22     :  1960 # of Visits since [facility]:   12   MRN: [id number]  CSN: [id number] Start of Care Date:    Insurance: Payor: MEDICARE / Plan: MEDICARE PART A AND B / Product Type: *No Product type* /   Insurance ID: [id number] - (Medicare) Secondary Insurance (if applicable): Insurance Information: Medicare   Referring Physician: MD Dr Lakesha Colin   PCP: Colleen Olea MD Visits to Date/Visits Approved:     No Show/Cancelled Appts:   /       Medical Diagnosis: Unspecified injury at unspecified level of cervical spinal cord, sequela [S14.109S]  Quadriplegia, C5-C7 incomplete [G82.54]  Cauda equina syndrome [G83.4] spinal cord injury , cervical region sequela (S14.109S), incomplete quadriplegia at C5-6 level (G82.54), cauda equina syndrome ( G83.4) - ICD-10 codes  Treatment Diagnosis: bilateral UE weakness & impaired coordination, bilateral shoulder stiffness, impaired sensation - hands lt more than rt  (M62.81, R27.9, M25.611, M25.612,R20.2        SUBJECTIVE EXAMINATION   Pain Level: Pain Screening  Patient Currently in Pain: Yes  Pain Assessment: 0-10  Pain Level: 5  Post Treatment Pain Level: 5  Patient's Stated Pain Goal: 0 - No pain  Pain Type: Acute pain,Chronic pain  Pain Location: Leg,Arm,Neck  Pain Orientation: Left,Right  Pain Descriptors: Tightness    Patient Comments: Subjective: Pt states that her pain is the same \"5\". Pt states that her MD said she can  wean herself off the collar, & the hardware looks good.     HEP Compliance: Good        OBJECTIVE EXAMINATION   Restrictions: Restrictions/Precautions: Fall Risk; General Precautions   Pt wearing Cascade collar today. Pt states MD told her she can wean herself off Cascade collar. TREATMENT     Exercises:     Treatment Reasoning    OT Exercise 1: PROM B UE,shoulders, wrist/hand, finger blocking exercises bilateral hands( PIP flex/extension 25x, DIP flex/[id number] ), stretch of left hand/wrist  OT Exercise 2: B UE shoulder( flexion/extension at 90 degrees 1# 15x, shoulder abduction/adduction at 90 degrees 1# 15x),Bilateral 1# 15x each (forearm supination/pronation, wrist flexion/extension (palm down, palm up)  OT Exercise 4: Red 3# digiflex 25x gripping bilateral hands,yellow 1.5# digiflex each finger oppositional pinch 20x, B hands - lay digiflex on table to do little finger pinching  OT Exercise 8: velcro board, remove 32 pieces  using left hand  and place to right then place back on board, remove 32 pieces using  right hand  and place to left then place back on board  OT Exercise 10: BTE #504 B wrists for flexion/extension, T = 6 lbs. , 120 seconds (rt hand, lt hand)  OT Exercise 11: BTE #162 hand gripper, T = 20 in lbs, 60 seconds(rt hand/lt hand);  pinch , T = 12  in lbs, 60 seconds. (rt hand/lt hand)  OT Exercise 15: yellow therapy ball bilateral UE exercises 20x each way  : roll ball on table (forward/back, side to side, circles cw & ccw) - pt stand , press into sides of ball 20x, press down on ball 20x  (pt standing)     Limitations addressed: Strength,Coordination,Flexibility,Activity tolerance   Therapist provided: Verbal cuing   Progressed: Progressed pt with increase torques  BTE tool 162 ( /pinch), tool 504 with increase torque. ASSESSMENT     Assessment: Assessment: Pt completes bilateral UE/hand therapeutic exercises to improve strength & coordination. Pt completes BTE exercises at higher torque for /pinch, wrist flex/extension  with no increase in pain .  PROM bilateral UEs to decrease muscle tightness. PROM bilateral UE - wfls. See OT exercises for details.   Performance deficits / Impairments: Decreased ROM,Decreased strength,Decreased coordination,Decreased fine motor control,Decreased high-level IADLs,Decreased sensation    Post-Treatment Pain Level: 5    Prognosis: Good    Activity Tolerance: Patient tolerated treatment well               TREATMENT PLAN   REQUIRES OT FOLLOW-UP: Yes  Type: Outpatient  Plan  Plan Frequency: 2-3x/week  Plan Weeks: 20 visits  Current Treatment Recommendations: Strengthening,ROM,Patient/Caregiver education & training,Coordination training,Positioning  Plan Comment: continue OT       Therapy Time  Individual Time In: 1596  Individual Time Out: 8542  Minutes: 46  Timed Code Treatment Minutes: 46 Minutes       Electronically signed by Gee Davies OT  on 7/7/2022 at 12:18 PM       Treatment Charges: Mins Units Time In/Out   [] Evaluation       []  Low       []  Moderate       []  High      []  Electrical Stim      []  Iontophoresis      []  Paraffin      []  Ultrasound        []  Masage      []  Neuromuscular Re-ed      []  ADL      [x]  Ther Exercise 46 3    []  Ther Activities      []  Neuro Re-Ed      []  Splinting      []  Other      Total Treatment time 46 min 3          POC NOTE

## 2022-07-11 ENCOUNTER — CARE COORDINATION (OUTPATIENT)
Dept: CARE COORDINATION | Age: 62
End: 2022-07-11

## 2022-07-11 NOTE — CARE COORDINATION
Left VM message asking patient to call me back at [phone] for care management follow up. She continues with PT and OT, saw neurosurgeon last week. Will call again next week.

## 2022-07-12 ENCOUNTER — HOSPITAL ENCOUNTER (OUTPATIENT)
Dept: OCCUPATIONAL THERAPY | Age: 62
Setting detail: THERAPIES SERIES
Discharge: HOME | End: 2022-07-12
Payer: MEDICARE

## 2022-07-12 ENCOUNTER — HOSPITAL ENCOUNTER (OUTPATIENT)
Dept: PHYSICAL THERAPY | Age: 62
Setting detail: THERAPIES SERIES
Discharge: HOME | End: 2022-07-12
Payer: MEDICARE

## 2022-07-12 PROCEDURE — 97110 THERAPEUTIC EXERCISES: CPT

## 2022-07-12 PROCEDURE — 97116 GAIT TRAINING THERAPY: CPT

## 2022-07-12 PROCEDURE — 97112 NEUROMUSCULAR REEDUCATION: CPT

## 2022-07-12 ASSESSMENT — PAIN DESCRIPTION - PAIN TYPE: TYPE: ACUTE PAIN;CHRONIC PAIN

## 2022-07-12 ASSESSMENT — PAIN DESCRIPTION - ORIENTATION: ORIENTATION: LEFT;RIGHT

## 2022-07-12 ASSESSMENT — PAIN DESCRIPTION - FREQUENCY: FREQUENCY: CONTINUOUS

## 2022-07-12 ASSESSMENT — PAIN DESCRIPTION - DESCRIPTORS: DESCRIPTORS: TIGHTNESS

## 2022-07-12 ASSESSMENT — PAIN DESCRIPTION - LOCATION: LOCATION: NECK;ARM;LEG

## 2022-07-12 ASSESSMENT — PAIN SCALES - GENERAL: PAINLEVEL_OUTOF10: 5

## 2022-07-12 NOTE — FLOWSHEET NOTE
[facility] Outpatient Physical Therapy   [address]   Phone: [phone]   Fax: [phone]    Physical Therapy Daily Treatment Note      Date:  2022  Patient Name:  Mitchell Martinez    :  1960  MRN: [id number]  Physician: Kentrell Gonzalez MD                            Insurance: Medicare - based on [place] -- [address] [place] [address] Diagnosis:   E26.035T (ICD-10-CM) - Spinal cord injury, cervical region, sequela ([facility] [address]   G82.54 (ICD-10-CM) - Incomplete quadriplegia at C5-6 level ([facility] [address]   G83.4 (ICD-10-CM) - Cauda equina syndrome ([facility] [address]   Rehab Codes: R25 pain , R53.1 weakness, Z74.0 reduced mobility, R29.6 high fall risk, R27.8 lack of coordination, R26.89 gait abnormality   Date of symptom onset: 21 -- cauda equina; 22 Cervical surgeries   Next 's appt. : 22 with Neurosurgery; 22 with PMR Dr. Eric Aguiar   Total Visits:   Cx/Ns:0/0   Date of initial visit: 22        Date of PN: 6/15/22 (visit 10)    PN needed by visit 20     Precautions: C-collar must be worn due to s/p C2-C7 fixation; not lifting greater than a gallon of milk: Left AFO      Subjective:  Patient arrives with 2WW. She notes she is able to wean out of her cervical collar. She notes she has not been wearing it at home. She notes she would also like to work more on postural strengthening .      Pain:  [x] Yes  [] No Location: Neck down to (B) Shoulders/UEs to knuckles in hands (B) LE's int feet and toes    Pain Rating: (0-10 scale) 5/10  Pain altered Tx:  [x] No  [] Yes  Action:  Comments:       Objective:  CERVICAL ROM   degrees comments   Cervical flexion 30    Cervical extension 22    R Cv sidebending  20    L Cv sidebending 22    R Cv rotation 45    L Cv rotation 30      Modalities:    Exercises:  Exercise Reps/ Time Weight/ Level Completed  Today Comments   nustpaul  5' Level 4  B UE/LE           Cervical stretching        Upper trap stretch  2x30s ea   x Self over pressure Levator scap stretch  2x30s ea   x Self over pressure    Doorway pec stretch  2x30s  x                  MAT LEVEL     Elevated with large wedge and 2 pillows    SKTC  2x30s ea    L ONLY    HS stretch  2x30s ea    L ONLY    Figure 4 stretch  2x30s ea   L ONLY    glute bridges with band  x15 EO   x15 EC  Red   Felt increased challenge with eyes closed due to proprioceptive deficits    Isolated hooklying clams   2x15 ea  Green    Isolated makes non moving limb stabilize                  Standing       Resisted STS    2x10   Green   From mat table    Sit to stand without hands  2x10   CGA to Miryam  in front to encourage anterior lean; cues for eccentric control   10x at lowest mat, 10x at chair level     Step ups Fwd/Lat 10x2 6\", 2#  unilateral UE support    3 way hip w/ eyes closed  10x ea  2 sets 3# aw  With BUE support, eyes closed to build proprioceptive input     Slow Controlled Marches 2-3\" to raise and lower 10x  2 sets  3# B LE's     Forced WB on 4\" step + 1/2 ball 2x30\" each   Encouraged light touch; WB L LE more difficult than R LE   TKE  20x  red     Slow high march w/ opposite UE raise 10x2 ea 3# L LE     Heel/toe raises  10x2      Semi tandem Stance  3x30\"   NO UE Support   EC Foam feet Normal Stance 2x30\"    NO UE Support   EO/ EC Foam Feet touching 3x30''   NO UE support  Alternated EO/EC   Standing on foam with press forward & press up  2x10 4# ball      Feet together Eye Closed Firm surface 2x30\"    NO UE Support- VERY difficult    Toe taps 6\" step + 1/2 ball 10x2 ea 2#  Unilateral UE support    Basil step overs w/ dual tasking  2x15 6 in basil x UE support with quad cane and pt naming nominal categories for dual tasking challenge                                GAIT TRAINING        Ambulation with [employer]  x150ft  x SBA -- cues for heel contact to get larger step lengths    ambulation with quad cane and ankle wt  [id number] 3# aw  x CGA-- focusing on larger step lengths           Other:    Specific Instructions for next treatment: Continue to challenge LE strengthening, stability and coordination work; challenge patient to decrease visual reliance for increased proprioceptive input. Work on dual tasking, incorporate postural strengthening as able     Assessment:    [x] Progressing toward goals. Began session with assessment of cervical mobility due to pt being out of her cervical collar. She has some cervical range limitations which is to be expected given her being is a brace for so long. Pt also lacking the postural strength to support a good upright alignment. Will add cervical ROM and posture goals to POC (see below) and begin to address in sessions. Provided with new HEP for cervical stretching and ROM. Focused session on gait training with quad cane. Cues to improve step length for more efficient ambulation. Added ankle weights to increase intensity. While walking noted that pt had instability with scanning the environment or saying high to people in the clinic. Decided to add a balance task of stepping over the basil with the cognitive dual task to challenge this patient while working on the stance stability and greater foot clearance. Pt is appropriately challenged and notes fatigue by the end of the session. [] No change. [] Other:    [x] Patient would continue to benefit from skilled physical therapy services in order to: improve strength,mobiltiy and motor control that impacts balance and mobility. GOALS -- assessed 6/15/22  STG: (to be met in 10 treatments)  1. Pt will improve score on ESCOBEDO balance scale by >/=6 points to improve overall balance stability and decrease fall risk with mobility. - 6/15: MET -- exceeds   2. Pt will demonstrate >10 SLS stability with light UE support to safely allow pt to enter/exit her tub shower. - 6/15: partially met -- light UE support for RLE, still difficulty on the L   3.  Pt will improve time on TUG to <20s with LRAD at mod IND level to demonstrate decreased fall risk. - 6/15: progressing -- 10 second improvement to 25.5s   4. Pt will increase gait speed to >0.8 m/s  With LRAD at mod I level to demonstrate decreased fall risk and optimize mobility to at community level. - 6/15: limited increase in speed but pt is now mod IND with 2WW   5. Pt will be able to manage her walker up 1 step without instability to improve independence with access to her home. - 6/15: progressing   6. Pt will improve time on 5xSTS to <15 seconds with use of 1 UE to decrease fall risk and increase functional capacity for mobility. - 6/15: completes with unilateral UE support this date but time is increased. LTG: (to be met in 20 treatments)  1. Pt will improve score on ESCOBEDO balance scale by >/= 45/56 points  to improve overall balance stability and decrease fall risk with mobility. - 6/15: progressing -- 33/56    2. Pt will increase strength of all major muscle groups of the LEs to 4/5 or greater demonstrating improved strength needed to maximize safety and efficiency with mobility tasks such as gait, transfers and stairs. - 6/15: progressing   3. Pt will be independent with home program addressing strength, flexibility and balance to maximize gains made in therapy to improve overall functional capacity for mobility.     -6/15: progressing -- will continue to update in POC   4. Pt will improve score on OPTIMAL to 46/90 to demonstrate functional improvements with ADLs.   -6/15: will assess at a later time    5. Pt will report no falls for x6 weeks demonstrating improved safety with mobility and implementation of fall prevention strategies. - 6/15: meeting currently with no falls noted   6. NEW GOAL 7/12: Pt will improve AROM in of cervical ROM to >60 bilaterally to improve ability to visually scan while driving. 7. NEW GOAL 7/12:  Pt will be able to maintain at least a neutral cervical alignment for > 75% of session showing improved cervical strength for keeping her head up to visualize her environment. 8. NEW GOAL 7/12: Pt will demonstrate improved postural awareness with only minimal cues to correct posture throughout session to prevent undue stress to spine. * further goals may be established based on additional assessments or progress*     Patient stated Goals: to walk without a walker, improve balance     Pt. Education:  [x] Yes  [] No  [x] Reviewed Prior HEP/Ed  Method of Education: [x] Verbal  [x] Demo  [] Written  Comprehension of Education:  [x] Verbalizes understanding. [] Demonstrates understanding. [] Needs review. [] Demonstrates/verbalizes HEP/Ed previously given.      Plan: [x] Continue per POC    [] Other:          Treatment Charges: Mins Units   []  Modalities     [x]  Ther Exercise 20 1   []  Manual Therapy     []  Ther Activities     []  Aquatics     [x]  Neuromuscular 10 1   [] Vasocompression     [x] Gait Training 17 1   [] Dry needling        [] 1 or 2 muscles        [] 3 or more muscles     []  Other     Total Treatment time 47 3   * KX modifier needed*     Time In: 6970 am       Time Out:  1130 am      Electronically signed by:  Merle Will, PT

## 2022-07-12 NOTE — PROGRESS NOTES
[address]         Phone: [phone]       Fax: [phone]     Occupational Therapy Daily Treatment note     Occupational Therapy: Daily Note   Patient: Tania Oscar (73 y.o. female)   Examination Date: 2022  No data recorded  Progress Note Counter: MD appt 22     :  1960 # of Visits since [facility]:   16   MRN: [id number]  CSN: [id number] Start of Care Date:    Insurance: Payor: MEDICARE / Plan: MEDICARE PART A AND B / Product Type: *No Product type* /   Insurance ID: [id number] - (Medicare) Secondary Insurance (if applicable): Insurance Information: Medicare   Referring Physician: MD Dr Anneliese Stroud   PCP: Janneth Villarreal MD Visits to Date/Visits Approved:     No Show/Cancelled Appts:   /       Medical Diagnosis: Unspecified injury at unspecified level of cervical spinal cord, sequela [S14.109S]  Quadriplegia, C5-C7 incomplete [G82.54]  Cauda equina syndrome [G83.4] spinal cord injury , cervical region sequela (S14.109S), incomplete quadriplegia at C5-6 level (G82.54), cauda equina syndrome ( G83.4) - ICD-10 codes  Treatment Diagnosis: bilateral UE weakness & impaired coordination, bilateral shoulder stiffness, impaired sensation - hands lt more than rt  (M62.81, R27.9, M25.611, M25.612,R20.2        SUBJECTIVE EXAMINATION   Pain Level: Pain Screening  Patient Currently in Pain: Yes  Pain Assessment: 0-10  Pain Level: 5  Post Treatment Pain Level: 5  Patient's Stated Pain Goal: 0 - No pain  Pain Type: Acute pain,Chronic pain  Pain Location: Neck,Arm,Leg  Pain Orientation: Left,Right  Pain Descriptors: Tightness  Pain Frequency: Continuous    Patient Comments: Subjective: Pt states she feels better with her ASPEN collar off.     HEP Compliance: Good        OBJECTIVE EXAMINATION   Restrictions: Restrictions/Precautions: Fall Risk; General Precautions upgraded bilateral UE strengthening exercises using theraband flexbar,red digiflex for pinching, increase torques BTE tool 162, 504, starting BTE tool 122 with only reports of arms/hands being a little tired. Rest breaks provided as needed. See OT exercises for details.   Performance deficits / Impairments: Decreased ROM,Decreased strength,Decreased coordination,Decreased fine motor control,Decreased high-level IADLs,Decreased sensation    Post-Treatment Pain Level: 5    Prognosis: Good    Activity Tolerance: Patient tolerated treatment well               TREATMENT PLAN   REQUIRES OT FOLLOW-UP: Yes  Type: Outpatient  Plan  Plan Frequency: 2-3x/week  Plan Weeks: 20 visits  Current Treatment Recommendations: Strengthening,ROM,Patient/Caregiver education & training,Coordination training,Positioning  Plan Comment: continue OT       Therapy Time  Individual Time In: 1119  Individual Time Out: 6177  Minutes: 46  Timed Code Treatment Minutes: 46 Minutes       Electronically signed by Adriana Jaeger OT  on 7/12/2022 at 12:19 PM       Treatment Charges: Mins Units Time In/Out   [] Evaluation       []  Low       []  Moderate       []  High      []  Electrical Stim      []  Iontophoresis      []  Paraffin      []  Ultrasound        []  Masage      []  Neuromuscular Re-ed      []  ADL      [x]  Ther Exercise 46 3    []  Ther Activities      []  Neuro Re-Ed      []  Splinting      []  Other      Total Treatment time 46 min 3          POC NOTE

## 2022-07-14 ENCOUNTER — HOSPITAL ENCOUNTER (OUTPATIENT)
Dept: OCCUPATIONAL THERAPY | Age: 62
Setting detail: THERAPIES SERIES
Discharge: HOME | End: 2022-07-14
Payer: MEDICARE

## 2022-07-14 ENCOUNTER — HOSPITAL ENCOUNTER (OUTPATIENT)
Dept: PHYSICAL THERAPY | Age: 62
Setting detail: THERAPIES SERIES
Discharge: HOME | End: 2022-07-14
Payer: MEDICARE

## 2022-07-14 PROCEDURE — 97110 THERAPEUTIC EXERCISES: CPT

## 2022-07-14 PROCEDURE — 97116 GAIT TRAINING THERAPY: CPT

## 2022-07-14 ASSESSMENT — PAIN DESCRIPTION - PAIN TYPE: TYPE: ACUTE PAIN

## 2022-07-14 ASSESSMENT — PAIN SCALES - GENERAL: PAINLEVEL_OUTOF10: 5

## 2022-07-14 ASSESSMENT — PAIN DESCRIPTION - LOCATION: LOCATION: NECK;ARM;LEG

## 2022-07-14 ASSESSMENT — PAIN DESCRIPTION - ORIENTATION: ORIENTATION: LEFT;RIGHT

## 2022-07-14 ASSESSMENT — PAIN DESCRIPTION - FREQUENCY: FREQUENCY: CONTINUOUS

## 2022-07-14 ASSESSMENT — PAIN DESCRIPTION - DESCRIPTORS: DESCRIPTORS: TIGHTNESS

## 2022-07-14 NOTE — FLOWSHEET NOTE
[facility]   [address]   Phone: [phone]   Fax: [phone]    Physical Therapy Daily Treatment Note      Date:  2022  Patient Name:  Elma Howe    :  1960  MRN: [id number]  Physician: Arturo Ramirez MD                            Insurance: Medicare - based on [place] [address] [facility] Diagnosis:   E12.091W (ICD-10-CM) - Spinal cord injury, cervical region, sequela ([facility] [address]   G82.54 (ICD-10-CM) - Incomplete quadriplegia at C5-6 level ([facility] [address]   G83.4 (ICD-10-CM) - Cauda equina syndrome ([facility] [address]   Rehab Codes: R25 pain , R53.1 weakness, Z74.0 reduced mobility, R29.6 high fall risk, R27.8 lack of coordination, R26.89 gait abnormality   Date of symptom onset: 21 -- cauda equina; 22 Cervical surgeries   Next 's appt. : 22 with Neurosurgery; 22 with PMR Dr. Tamar Vasquez   Total Visits:   Cx/Ns:0/0   Date of initial visit: 22        Date of PN: 6/15/22 (visit 10)    PN needed by visit 20     Precautions: can now wean out of cervical collar; not lifting greater than a gallon of milk: Left AFO      Subjective:  Patient arrives with 2WW. She is not wearing her cerivcal collar. Notes she has not done the stretches at home. She notes her neck has not been bother her much. Only when she sleeps wrong.      Pain:  [x] Yes  [] No Location: Neck down to (B) Shoulders/UEs to knuckles in hands (B) LE's int feet and toes    Pain Rating: (0-10 scale) 5/10  Pain altered Tx:  [x] No  [] Yes  Action:  Comments:       Objective:   Exercises:  Exercise Reps/ Time Weight/ Level Completed  Today Comments   nustep  5' Level 4  B UE/LE           Cervical stretching        Upper trap stretch  2x30s ea   x Self over pressure    Levator scap stretch  2x30s ea   x Self over pressure    Rotation stretch with towel  5x5s ea   x    Doorway pec stretch  2x30s             Postural strengthening        Seated rows  2x15 Red  x    Seated horizontal ABd  2x15 Red  x                  MAT LEVEL     Elevated with large wedge and 2 pillows    SKTC  2x30s ea    L ONLY    HS stretch  2x30s ea    L ONLY    Figure 4 stretch  2x30s ea   L ONLY    glute bridges with band  x15 EO   x15 EC  Red   Felt increased challenge with eyes closed due to proprioceptive deficits    Isolated hooklying clams   2x15 ea  Green    Isolated makes non moving limb stabilize                  Standing       Resisted STS    2x10   Green   From mat table    Sit to stand without hands  2x10   CGA to Miryam  in front to encourage anterior lean; cues for eccentric control   10x at lowest mat, 10x at chair level     Step ups Fwd/Lat 10x2 6\", 2#  unilateral UE support    3 way hip w/ eyes closed  10x ea  2 sets 3# aw  With BUE support, eyes closed to build proprioceptive input     Slow Controlled Marches 2-3\" to raise and lower 10x  2 sets  3# B LE's     Forced WB on 4\" step + 1/2 ball 2x30\" each   Encouraged light touch; WB L LE more difficult than R LE   TKE  20x  red     Slow high march w/ opposite UE raise 10x2 ea 3# L LE     Heel/toe raises  10x2      Semi tandem Stance  3x30\"   NO UE Support   EC Foam feet Normal Stance 2x30\"    NO UE Support   EO/ EC Foam Feet touching 3x30''   NO UE support  Alternated EO/EC   Standing on foam with press forward & press up  2x10 4# ball      Feet together Eye Closed Firm surface 2x30\"    NO UE Support- VERY difficult    Toe taps 6\" step + 1/2 ball 10x2 ea 2#  Unilateral UE support    Basil step overs w/ dual tasking  2x15 6 in basil x UE support with quad cane and pt naming nominal categories for dual tasking challenge                                GAIT TRAINING        Ambulation with SBQC  x75ft  x SBA -- cues for heel contact to get larger step lengths    Straight leg kicks with SBQC 2x20ft   x CGA    Backwards walking with SBQC  2x20ft   x CGA -- Tactile cues for posture    Walking with horizontal head turns  2x20ft   x Moderate instability Walking with vertical head turns  2x20ft   x Moderate instability    Marching with SBQC  2x20ft   x CGA   Curb steps with SBQC  x8 4\" x CGA to Miryam -- trialed with ea leg leading ascending and descending    ambulation with quad cane and ankle wt  3h089mm 3# aw   CGA-- focusing on larger step lengths           Other:    Specific Instructions for next treatment: Continue to challenge LE strengthening, stability and coordination work; challenge patient to decrease visual reliance for increased proprioceptive input. Work on dual tasking, incorporate postural strengthening as able     Assessment:    [x] Progressing toward goals. Began session with cervical stretching and strengthening to promote better posture. Then transitioned to ambulation tasks with quad cane, Improving instability with standard walking needing only SBA but still unstable when a second task or complexity of pattern needed. Completed various walking tasks such as marching, kicks, backwards, and head turns for further challenge requiring pt to motor plan a different pattern and control for stability. Ended with a curb step as she may encounter this with community walking. Needing CGA to Miryam for stability. Moderate hesitation for movement control and stability on LLE. Overall by end of session pt is very fatigued but gave good effort throughout. [] No change. [] Other:    [x] Patient would continue to benefit from skilled physical therapy services in order to: improve strength,mobiltiy and motor control that impacts balance and mobility. GOALS -- assessed 6/15/22  STG: (to be met in 10 treatments)  1. Pt will improve score on ESCOBEDO balance scale by >/=6 points to improve overall balance stability and decrease fall risk with mobility. - 6/15: MET -- exceeds   2. Pt will demonstrate >10 SLS stability with light UE support to safely allow pt to enter/exit her tub shower.    - 6/15: partially met -- light UE support for RLE, still difficulty on the L   3. Pt will improve time on TUG to <20s with LRAD at mod IND level to demonstrate decreased fall risk. - 6/15: progressing -- 10 second improvement to 25.5s   4. Pt will increase gait speed to >0.8 m/s  With LRAD at mod I level to demonstrate decreased fall risk and optimize mobility to at community level. - 6/15: limited increase in speed but pt is now mod IND with 2WW   5. Pt will be able to manage her walker up 1 step without instability to improve independence with access to her home. - 6/15: progressing   6. Pt will improve time on 5xSTS to <15 seconds with use of 1 UE to decrease fall risk and increase functional capacity for mobility. - 6/15: completes with unilateral UE support this date but time is increased. LTG: (to be met in 20 treatments)  1. Pt will improve score on ESCOBEDO balance scale by >/= 45/56 points  to improve overall balance stability and decrease fall risk with mobility. - 6/15: progressing -- 33/56    2. Pt will increase strength of all major muscle groups of the LEs to 4/5 or greater demonstrating improved strength needed to maximize safety and efficiency with mobility tasks such as gait, transfers and stairs. - 6/15: progressing   3. Pt will be independent with home program addressing strength, flexibility and balance to maximize gains made in therapy to improve overall functional capacity for mobility.     -6/15: progressing -- will continue to update in POC   4. Pt will improve score on OPTIMAL to 46/90 to demonstrate functional improvements with ADLs.   -6/15: will assess at a later time    5. Pt will report no falls for x6 weeks demonstrating improved safety with mobility and implementation of fall prevention strategies. - 6/15: meeting currently with no falls noted   6. NEW GOAL 7/12: Pt will improve AROM in of cervical ROM to >60 bilaterally to improve ability to visually scan while driving. 7. NEW GOAL 7/12:  Pt will be able to maintain at least a neutral cervical alignment for > 75% of session showing improved cervical strength for keeping her head up to visualize her environment. 8. NEW GOAL 7/12: Pt will demonstrate improved postural awareness with only minimal cues to correct posture throughout session to prevent undue stress to spine. * further goals may be established based on additional assessments or progress*     Patient stated Goals: to walk without a walker, improve balance     Pt. Education:  [x] Yes  [] No  [x] Reviewed Prior HEP/Ed  Method of Education: [x] Verbal  [x] Demo  [] Written  Comprehension of Education:  [x] Verbalizes understanding. [] Demonstrates understanding. [] Needs review. [] Demonstrates/verbalizes HEP/Ed previously given.      Plan: [x] Continue per POC    [] Other:          Treatment Charges: Mins Units   []  Modalities     [x]  Ther Exercise 10 1   []  Manual Therapy     []  Ther Activities     []  Aquatics     []  Neuromuscular     [] Vasocompression     [x] Gait Training 32 2   [] Dry needling        [] 1 or 2 muscles        [] 3 or more muscles     []  Other     Total Treatment time 42 3   * KX modifier needed*     Time In: 2688 am       Time Out:  3866 am      Electronically signed by:  Ganesh Juarez, PT

## 2022-07-14 NOTE — PROGRESS NOTES
General Precautions      TREATMENT     Exercises:     Treatment Reasoning    OT Exercise 1: PROM B UE,shoulders, wrist/hand, finger blocking exercises bilateral hands( PIP flex/extension 25x, DIP flex/[id number] ), stretch of left hand/wrist  OT Exercise 8: velcro board, remove 32 pieces  using left hand  and place to right then place back on board, remove 32 pieces using  right hand  and place to left then place back on board  OT Exercise 10: BTE #504 B wrists for flexion/extension, T = 7  lbs. , 120 seconds (rt hand, lt hand)  OT Exercise 11: BTE #162 hand gripper, T = 25in lbs, 70 seconds(rt hand/lt hand);  pinch , T = 12  in lbs, 60 seconds. (rt hand/lt hand)  OT Exercise 13: orange power web (light resistance) gripping bilateral hands 2 sets  25x, push for hand/wrist extension  rt/lt  2 sests 25x  OT Exercise 16: press down cone in putty: 20x each way for /lt hand  (holding small end cone, holding large end cone), 35x hold small end rt hand, 20x hold large end for rt hand  OT Exercise 17: key pegs : using lt hand place all 25 pegs in board, them remove pegs & hold in lt hand. OT Exercise 18: BTE tool 122 ergonometer T = 3 lb (75 seconds forward, 75 seconds backward)     Limitations addressed: Strength,Coordination,Flexibility (rom)   Therapist provided: Verbal cuing   Progressed: Repetitions   Progressed: Increase time for BTE tool 162 gripping. . Increase reps pressing cone in putty for UE strengthening. Patient Education:         ASSESSMENT     Assessment: Assessment: Pt participates in bilateral UE therapeutic exericse  for strengthening & coordination. Pt reports bilateral hand soreness /fatigue after completing wrist exercises with BTE tool 504. Pt demo improving lt hand dexterity with only dropping 1 key peg  after removing pegs from the board. PROM to decrease bilateral UE muscle tightness. Set up for BTE bilateral UE strengthening exercises.   See OT exercises for details.   Performance deficits / Impairments: Decreased ROM,Decreased strength,Decreased coordination,Decreased fine motor control,Decreased high-level IADLs,Decreased sensation    Post-Treatment Pain Level: 5    Prognosis: Good    Activity Tolerance: Patient tolerated treatment well               TREATMENT PLAN   REQUIRES OT FOLLOW-UP: Yes  Type: Outpatient  Plan  Plan Frequency: 2-3x/week  Plan Weeks: 20 visits  Current Treatment Recommendations: Strengthening,ROM,Patient/Caregiver education & training,Coordination training,Positioning  Plan Comment: continue OT       Therapy Time  Individual Time In: 7736  Individual Time Out: 2465  Minutes: 46  Timed Code Treatment Minutes: 46 Minutes       Electronically signed by Tl Winchester OT  on 7/14/2022 at 2:15 PM       Treatment Charges: Mins Units Time In/Out   [] Evaluation       []  Low       []  Moderate       []  High      []  Electrical Stim      []  Iontophoresis      []  Paraffin      []  Ultrasound        []  Masage      []  Neuromuscular Re-ed      []  ADL      [x]  Ther Exercise 46 3    []  Ther Activities      []  Neuro Re-Ed      []  Splinting      []  Other      Total Treatment time 46 min 3          POC NOTE

## 2022-07-15 ENCOUNTER — OFFICE VISIT (OUTPATIENT)
Dept: PRIMARY CARE CLINIC | Age: 62
End: 2022-07-15
Payer: MEDICARE

## 2022-07-15 VITALS
OXYGEN SATURATION: 96 % | HEART RATE: 94 BPM | WEIGHT: 137.2 LBS | SYSTOLIC BLOOD PRESSURE: 136 MMHG | HEIGHT: 61 IN | BODY MASS INDEX: 25.9 KG/M2 | DIASTOLIC BLOOD PRESSURE: 70 MMHG

## 2022-07-15 DIAGNOSIS — R20.0 NUMBNESS AND TINGLING OF BOTH LOWER EXTREMITIES: ICD-10-CM

## 2022-07-15 DIAGNOSIS — I10 ESSENTIAL HYPERTENSION: Primary | ICD-10-CM

## 2022-07-15 DIAGNOSIS — E03.8 OTHER SPECIFIED HYPOTHYROIDISM: ICD-10-CM

## 2022-07-15 DIAGNOSIS — R20.2 NUMBNESS AND TINGLING OF BOTH LOWER EXTREMITIES: ICD-10-CM

## 2022-07-15 PROCEDURE — 99214 OFFICE O/P EST MOD 30 MIN: CPT | Performed by: FAMILY MEDICINE

## 2022-07-15 PROCEDURE — 1036F TOBACCO NON-USER: CPT | Performed by: FAMILY MEDICINE

## 2022-07-15 PROCEDURE — 3017F COLORECTAL CA SCREEN DOC REV: CPT | Performed by: FAMILY MEDICINE

## 2022-07-15 PROCEDURE — G8419 CALC BMI OUT NRM PARAM NOF/U: HCPCS | Performed by: FAMILY MEDICINE

## 2022-07-15 PROCEDURE — G8427 DOCREV CUR MEDS BY ELIG CLIN: HCPCS | Performed by: FAMILY MEDICINE

## 2022-07-15 NOTE — PROGRESS NOTES
[facility]  [address] [facility] [address]  Dept: [address] is a 65 y.o. female Established patient, who presents today for her medical conditions/complaintsas noted below. Chief Complaint   Patient presents with    Hypertension     Pt here today for 3 month f/u, pt asking for a new handicap placard. HPI:     HPI  Has been going to OT and PT, getting stronger  Has good apettite  A few beers off and on per patient.     Reviewed prior notes None  Reviewed previous Labs    LDL Calculated (mg/dL)   Date Value   07/23/2020 75   10/19/2018 71       (goal LDL is <100)   AST (U/L)   Date Value   04/13/2022 31     ALT (U/L)   Date Value   04/13/2022 13     BUN (mg/dL)   Date Value   04/23/2022 7 (L)     TSH (uIU/mL)   Date Value   04/24/2022 16.42 (H)     BP Readings from Last 3 Encounters:   07/15/22 136/70   07/06/22 138/79   06/08/22 131/81          (goal 120/80)    Past Medical History:   Diagnosis Date    Anxiety     Arthritis     At maximum risk for fall 12/29/2021    caudal equina syndrome and cervical stenosis    Cancer (HCC)     right breast    Cauda equina syndrome (HCC) 12/29/2021    neuropathy, mobility difficulty, incontinance    Cervical stenosis of spine 12/29/2021    GERD (gastroesophageal reflux disease)     History of stomach ulcers     Hypertension     Dr. Kristi Munoz    Hypothyroidism     Lumbar neuritis     Post laminectomy syndrome     Spinal deformity 11/2021    cervical and lumbar    Thyroid disease     Uses wheelchair     Wears dentures     Wellness examination     Dr. Kristi Munoz      Past Surgical History:   Procedure Laterality Date    BACK SURGERY      lower back x 3, cervical- x 1: C4- C6, 11/4/2014     BREAST SURGERY Right     mastectomy with reconstruction    CERVICAL FUSION N/A 4/12/2022    C5 CORPECTOMY, USE OF INTRAOPERATIVE CERVICAL TRACTION performed by Omayra De Anda DO at [facility] 2022    POSTERIOR FIXATION C2-C7 performed by Jesse Mercer DO at [facility]  about 2018    HERNIA REPAIR Bilateral     inguinal    HYSTERECTOMY, TOTAL ABDOMINAL (CERVIX REMOVED)      LUMBAR SPINE SURGERY N/A 2021    LUMBAR LAMINECTOMY L2-S1 performed by Jesse Mercer DO at [address] [facility] [place], RADICAL      OTHER SURGICAL HISTORY  2022    C5 CORPECTOMY, USE OF INTRAOPERATIVE CERVICAL TRACTION (N/A Spine Cervical)        Family History   Problem Relation Age of Onset    Hypertension Mother     Heart Disease Mother     Cancer Mother        Social History     Tobacco Use    Smoking status: Former     Packs/day: 0.50     Years: 30.00     Pack years: 15.00     Types: Cigarettes     Quit date:      Years since quittin.5    Smokeless tobacco: Never   Substance Use Topics    Alcohol use: Yes     Alcohol/week: 0.0 standard drinks     Comment: weekend at times      Current Outpatient Medications   Medication Sig Dispense Refill    Handicap Placard MISC by Does not apply route Cannot walk 200 feet due to medical issues  Expires 2027 1 each 0    busPIRone (BUSPAR) 30 MG tablet TAKE ONE TABLET BY MOUTH TWICE A DAY 60 tablet 3    HYDROcodone-acetaminophen (NORCO)  MG per tablet Take 1 tablet by mouth every 6 hours as needed for Pain.       lisinopril (PRINIVIL;ZESTRIL) 5 MG tablet Take 1 tablet by mouth daily 30 tablet 3    dilTIAZem (CARDIZEM CD) 180 MG extended release capsule Take 1 capsule by mouth daily 30 capsule 3    fluticasone (FLONASE) 50 MCG/ACT nasal spray 1 spray by Each Nostril route daily 16 g 3    docusate sodium (COLACE) 100 MG capsule Take 2 capsules by mouth 2 times daily 60 capsule 1    baclofen (LIORESAL) 10 MG tablet Take 1 tablet by mouth 4 times daily 120 tablet 1    levothyroxine (SYNTHROID) 100 MCG tablet Take 1 tablet by mouth Daily 30 tablet 3    pantoprazole (PROTONIX) 40 MG tablet Take 1 tablet by mouth daily 30 tablet 3    Vitamin D, Ergocalciferol, 50 MCG (2000 UT) CAPS TAKE ONE CAPSULE BY MOUTH DAILY 90 capsule 3    methadone (DOLOPHINE) 10 MG tablet Take 10 mg by mouth 2 times daily. ferrous sulfate (IRON 325) 325 (65 Fe) MG tablet Take 1 tablet by mouth 2 times daily (with meals) (Patient not taking: Reported on 7/15/2022) 30 tablet 3     No current facility-administered medications for this visit. Allergies   Allergen Reactions    Latex Hives, Itching, Dermatitis and Rash    Kiwi Extract      Face swelling, some difficulty breathing       Health Maintenance   Topic Date Due    Shingles vaccine (1 of 2) Never done    Breast cancer screen  08/30/2021    COVID-19 Vaccine (4 - Booster for Moderna series) 04/15/2022    Flu vaccine (1) 09/01/2022    Depression Screen  03/18/2023    Annual Wellness Visit (AWV)  03/19/2023    Lipids  07/23/2025    Colorectal Cancer Screen  07/09/2028    DTaP/Tdap/Td vaccine (2 - Td or Tdap) 07/25/2029    Hepatitis C screen  Completed    HIV screen  Completed    Hepatitis A vaccine  Aged Out    Hepatitis B vaccine  Aged Out    Hib vaccine  Aged Out    Meningococcal (ACWY) vaccine  Aged Out    Pneumococcal 0-64 years Vaccine  Aged Out       Subjective:      Review of Systems    Objective:     /70   Pulse 94   Ht 5' 1\" (1.549 m)   Wt 137 lb 3.2 oz (62.2 kg)   SpO2 96%   BMI 25.92 kg/m²   Physical Exam  Vitals and nursing note reviewed. Constitutional:       General: She is not in acute distress. Appearance: She is well-developed. She is not ill-appearing. HENT:      Head: Normocephalic and atraumatic. Right Ear: External ear normal.      Left Ear: External ear normal.   Eyes:      General: No scleral icterus. Right eye: No discharge. Left eye: No discharge. Conjunctiva/sclera: Conjunctivae normal.   Neck:      Thyroid: No thyromegaly. Trachea: No tracheal deviation. Cardiovascular:      Rate and Rhythm: Normal rate and regular rhythm.       Heart sounds: Normal heart sounds. Pulmonary:      Effort: Pulmonary effort is normal. No respiratory distress. Breath sounds: Normal breath sounds. No wheezing. Musculoskeletal:      Comments: Uses walker   Lymphadenopathy:      Cervical: No cervical adenopathy. Skin:     General: Skin is warm. Findings: No rash. Neurological:      Mental Status: She is alert and oriented to person, place, and time. Psychiatric:         Mood and Affect: Mood normal.         Behavior: Behavior normal.         Thought Content: Thought content normal.       Assessment:       Diagnosis Orders   1. Essential hypertension        2. Other specified hypothyroidism  TSH With Reflex Ft4      3. Numbness and tingling of both lower extremities  Handicap Placard MISC             Plan:      No follow-ups on file. She needs to stop drinking alcohol. She can try having nonalcoholic beer if she wants beer. Orders Placed This Encounter   Procedures    TSH With Reflex Ft4     Standing Status:   Future     Standing Expiration Date:   7/15/2023     Orders Placed This Encounter   Medications    Handicap Placard MISC     Sig: by Does not apply route Cannot walk 200 feet due to medical issues  Expires 7/14/2027     Dispense:  1 each     Refill:  0       Patient given educationalmaterials - see patient instructions. Discussed use, benefit, and side effectsof prescribed medications. All patient questions answered. Pt voiced understanding. Reviewed health maintenance. Instructed to continue current medications, diet andexercise. Patient agreed with treatment plan. Follow up as directed.      Electronicallysigned by Yoly Ramires MD on 7/15/2022 at 3:48 PM

## 2022-07-19 ENCOUNTER — HOSPITAL ENCOUNTER (OUTPATIENT)
Dept: OCCUPATIONAL THERAPY | Age: 62
Setting detail: THERAPIES SERIES
Discharge: HOME | End: 2022-07-19
Payer: MEDICARE

## 2022-07-19 ENCOUNTER — CARE COORDINATION (OUTPATIENT)
Dept: CARE COORDINATION | Age: 62
End: 2022-07-19

## 2022-07-19 ENCOUNTER — HOSPITAL ENCOUNTER (OUTPATIENT)
Dept: PHYSICAL THERAPY | Age: 62
Setting detail: THERAPIES SERIES
Discharge: HOME | End: 2022-07-19
Payer: MEDICARE

## 2022-07-19 PROCEDURE — 97116 GAIT TRAINING THERAPY: CPT

## 2022-07-19 PROCEDURE — 97112 NEUROMUSCULAR REEDUCATION: CPT

## 2022-07-19 PROCEDURE — 97110 THERAPEUTIC EXERCISES: CPT

## 2022-07-19 ASSESSMENT — PAIN DESCRIPTION - PAIN TYPE: TYPE: ACUTE PAIN

## 2022-07-19 ASSESSMENT — PAIN SCALES - GENERAL: PAINLEVEL_OUTOF10: 5

## 2022-07-19 ASSESSMENT — PAIN DESCRIPTION - LOCATION: LOCATION: NECK

## 2022-07-19 ASSESSMENT — PAIN DESCRIPTION - ORIENTATION: ORIENTATION: RIGHT;LEFT;MID

## 2022-07-19 ASSESSMENT — PAIN DESCRIPTION - FREQUENCY: FREQUENCY: CONTINUOUS

## 2022-07-19 NOTE — PROGRESS NOTES
[address]         Phone: [phone]       Fax: [phone]     Occupational Therapy Daily Treatment note     Occupational Therapy: Daily Note   Patient: Janet Sandra (35 y.o. female)   Examination Date: 2022  No data recorded  Progress Note Counter: MD appt 22     :  1960 # of Visits since [facility] [place]:   23   MRN: [id number]  CSN: [id number] Start of Care Date:    Insurance: Payor: MEDICARE / Plan: MEDICARE PART A AND B / Product Type: *No Product type* /   Insurance ID: [id number] - (Medicare) Secondary Insurance (if applicable): Insurance Information:     Referring Physician: Lazara Kaufman MD     PCP: You France MD Visits to Date/Visits Approved:     No Show/Cancelled Appts:   /       Medical Diagnosis: Unspecified injury at unspecified level of cervical spinal cord, sequela [S14.109S]  Quadriplegia, C5-C7 incomplete [G82.54]  Cauda equina syndrome [G83.4]    Treatment Diagnosis: bilateral UE weakness & impaired coordination, bilateral shoulder stiffness, impaired sensation - hands lt more than rt  (M62.81, R27.9, M25.611, M25.612,R20.2        SUBJECTIVE EXAMINATION   Pain Level: Pain Screening  Patient Currently in Pain: Yes  Pain Assessment: 0-10  Pain Level: 5  Best Pain Level: 5  Pain Type: Acute pain  Pain Location: Neck  Pain Orientation: Right, Left, Mid  Pain Radiating Towards: Radiates down from her neck all the way to her feet, reports intermittent \"grabbing\" pain in L trapezius  Pain Frequency: Continuous    Patient Comments: Subjective: Pt reports that she is happy to not have to wear her c-collar anymore, denies any increase in pain or change in symptoms.       TREATMENT     Exercises:     Treatment Reasoning    OT Exercise 1: PROM B UE,shoulders, wrist/hand, finger blocking exercises bilateral hands( PIP flex/extension 25x, DIP flex/[id number] ), stretch of left hand/wrist  OT Exercise 2: B UE shoulder flexion/extension 1# 15x, shoulder abduction/adduction 1# 15x Bilateral 1# 15x each (forearm supination/pronation, wrist flexion/extension (palm down, palm up) (Shoulder exercises only this date.)  OT Exercise 10: BTE #504 B wrists for flexion/extension, T = 7  lbs. , 120 seconds (rt hand, lt hand)  OT Exercise 11: BTE #162 hand gripper, T = 25in lbs, 90 seconds(rt hand/lt hand);  pinch , T = 12  in lbs, 60 seconds. (rt hand/lt hand)  OT Exercise 12: graded clothespins: place/remove  6 red 2#, 6 green 4#, 6 blue 6# ( using rt hand, using lt hand) - pt sitting  OT Exercise 16: press down cone in putty: 20x each way for rt/lt hand  (holding small end cone, holding large end cone)  OT Exercise 17: key pegs : using lt hand place all 25 pegs in board, them remove pegs & hold in lt hand. (Dropped three pegs this date.)    Limitations addressed: Strength, Coordination, Flexibility  Therapist provided: Verbal cuing  Progressed: Resistance, Repetitions  Progressed: Increased time for  gripping from 70 seconds to 90 seconds; Increased resistance of graded clothespins to add blue 6#                        ASSESSMENT     Assessment: Assessment: OT treatment focused on increasing ROM, strength, and coordination in BUE for improved functional performance - see OT exercises for detailed report. Pt tolerated treatment well with occasional brief rest breaks 2* fatigue. Pt demonstrating progress in strength and endurance as evidenced by tolerating increased time with  gripping exercise and increased resistance with graded clothespins. Pt dropped 3 pegs during key peg activity this date, but task was completed at end of session and pt had notable fatigue in L hand.   Performance deficits / Impairments: Decreased ROM, Decreased strength, Decreased coordination, Decreased fine motor control, Decreased high-level IADLs, Decreased sensation    Post-Treatment Pain Level:      Prognosis: Good    Activity Tolerance: Patient tolerated treatment well               TREATMENT PLAN   REQUIRES OT FOLLOW-UP: Yes  Type: Outpatient       Therapy Time  Individual Time In: 9022  Individual Time Out: 5008  Minutes: 45  Timed Code Treatment Minutes: 45 Minutes       Electronically signed by Quoc Raphael  on 7/19/2022 at 1:56 PM       Treatment Charges: Mins Units Time In/Out   [] Evaluation       []  Low       []  Moderate       []  High      []  Electrical Stim      []  Iontophoresis      []  Paraffin      []  Ultrasound        []  Masage      []  Neuromuscular Re-ed      []  ADL      [x]  Ther Exercise 45 7 [id number]   []  Ther Activities      []  Neuro Re-Ed      []  Splinting      []  Other      Total Treatment time 45 min           POC NOTE

## 2022-07-19 NOTE — CARE COORDINATION
Left VM message asking patient to call me back at [phone] for care management follow up. Will call again in a week.     Future Appointments   Date Time Provider [place]   7/21/2022 10:30 AM Gisselle Arcos, PT [facility]   7/21/2022 11:15 AM Kathryn Bello, OT [facility]   7/27/2022 10:45 AM Kathryn Bello, OT [facility]   7/27/2022 11:30 AM Asia Machuca, PTA [facility]   7/29/2022 10:45 AM Kathryn Bello, OT [facility]   7/29/2022 11:30 AM Gisselle Arcos, PT [facility]   8/17/2022  3:30 PM Gabo Cast MD [facility]   10/12/2022 10:30 AM DO Geovanny Ch Neuro [facility]

## 2022-07-19 NOTE — FLOWSHEET NOTE
[facility] Outpatient Physical Therapy   [address]   Phone: [phone]   Fax: [phone]    Physical Therapy Daily Treatment Note      Date:  2022  Patient Name:  Jarrell Verdin    :  1960  MRN: [id number]  Physician: Beebto Dodson MD                            Insurance: Medicare - based on [place] -- [address] [place] [address] Diagnosis:   W56.681C (ICD-10-CM) - Spinal cord injury, cervical region, sequela ([facility] [address] [address]   G82.54 (ICD-10-CM) - Incomplete quadriplegia at C5-6 level ([facility]   G83.4 (ICD-10-CM) - Cauda equina syndrome ([facility] [address] [facility]   Rehab Codes: R25 pain , R53.1 weakness, Z74.0 reduced mobility, R29.6 high fall risk, R27.8 lack of coordination, R26.89 gait abnormality   Date of symptom onset: 21 -- cauda equina; 22 Cervical surgeries   Next 's appt. : 22 with Neurosurgery; 22 with PMR Dr. Devaughn Holder   Total Visits:   Cx/Ns:0/0   Date of initial visit: 22        Date of PN: 6/15/22 (visit 10)    PN needed by visit 20     Precautions: can now wean out of cervical collar; not lifting greater than a gallon of milk: Left AFO      Subjective:  Patient arrives with 2WW. She is not wearing her cerivcal collar. She notes no significant changes since the last session.  She notes she is getting some pain in the L side of neck with raising of the L arm     Pain:  [x] Yes  [] No Location: Neck down to (B) Shoulders/UEs to knuckles in hands (B) LE's int feet and toes    Pain Rating: (0-10 scale) 5/10  Pain altered Tx:  [x] No  [] Yes  Action:  Comments:       Objective:   Exercises:  Exercise Reps/ Time Weight/ Level Completed  Today Comments   nustep  5' Level 4  B UE/LE           Cervical stretching        Upper trap stretch  2x30s ea    Self over pressure    Levator scap stretch  2x30s ea    Self over pressure    Rotation stretch with towel  5x5s ea       Doorway pec stretch  2x30s             Postural strengthening        Seated rows  2x15 Red  x Seated horizontal ABd  2x15 Red  x    Seated chin tucks  2x10 Hold 3s  x Tactile cues for appropriate activation    Standing resisted rotations  X10 ea  Red  x Good challenge for balance           MAT LEVEL     Elevated with large wedge and 2 pillows    SKTC  2x30s ea    L ONLY    HS stretch  2x30s ea    L ONLY    Figure 4 stretch  2x30s ea   L ONLY    glute bridges with band  x15 EO   x15 EC  Red   Felt increased challenge with eyes closed due to proprioceptive deficits    Isolated hooklying clams   2x15 ea  Green    Isolated makes non moving limb stabilize                  Standing       Resisted STS    2x10   Green   From mat table    Sit to stand without hands  2x10   CGA to Miryam  in front to encourage anterior lean; cues for eccentric control   10x at lowest mat, 10x at chair level     Step ups Fwd/Lat 10x2 6\", 2#  unilateral UE support    3 way hip w/ eyes closed  10x ea  2 sets 3# aw  With BUE support, eyes closed to build proprioceptive input     Slow Controlled Marches 2-3\" to raise and lower 10x  2 sets  No UE support  x CGA    Forced WB on 4\" step + 1/2 ball 2x30\" each   Encouraged light touch; WB L LE more difficult than R LE   Slow high march w/ opposite UE raise 10x2 ea 3# L LE     Heel/toe raises  10x2      Semi tandem Stance  3x30\"  x NO UE Support, CGA    EC Foam feet Normal Stance 2x30\"    NO UE Support   EO/ EC Foam Feet touching 3x30''   NO UE support  Alternated EO/EC   Standing on foam with press forward & press up  2x10 4# ball      Feet together Eye Closed Firm surface 2x30\"   x NO UE Support- cues at hips as weight is shifted over LLE     Toe taps 6\" step + 1/2 ball 10x2 ea 2#  Unilateral UE support    Basil step overs w/ dual tasking  2x15 6 in basil  UE support with quad cane and pt naming nominal categories for dual tasking challenge                                GAIT TRAINING        Ambulation with SBQC  x75ft   SBA -- cues for heel contact to get larger step lengths    Ambulation with SPC  2x75ft  x CGA -- RPS 6/10, multiple balance checks in initial laps    Straight leg kicks with SBQC 2x30ft   x CGA    Backwards walking with SBQC  2x30ft   x CGA -- Tactile cues for posture    Walking with horizontal head turns  2x20ft    Moderate instability    Walking with vertical head turns  2x20ft    Moderate instability    Marching with SBQC  2x20ft   x CGA   Curb steps with SBQC  x8 4\"  CGA to Miryam -- trialed with ea leg leading ascending and descending    ambulation with quad cane and ankle wt  [id number] 3# aw   CGA-- focusing on larger step lengths           Other:    Specific Instructions for next treatment: Continue to challenge LE strengthening, stability and coordination work; challenge patient to decrease visual reliance for increased proprioceptive input. Work on dual tasking, incorporate postural strengthening as able     Assessment:    [x] Progressing toward goals. Began session with proximal strengthening. Added in cervical chin tucks to promote better alignment. Worked through several balance tasks to improve proprioception. Still very limited in balance control when on LLE due to sensory and motor deficits. Some mild sway but much improved than prior. Did trial a SPC to see her stability with it. Overall does well with some balance checks initially but this reduces on second lap. Pt percieves herself as mild unbalanced with the SPC as compared to quad cane. Not enough stability from [facility] for dynamic walking balance activities so returned to quad cane for that. Showing improved stability. By the end of session pt is appropriately fatigued. [] No change. [] Other:    [x] Patient would continue to benefit from skilled physical therapy services in order to: improve strength,mobiltiy and motor control that impacts balance and mobility.      GOALS -- assessed 6/15/22  STG: (to be met in 10 treatments)   Pt will improve score on ESCOBEDO balance scale by >/=6 points to improve overall balance stability and decrease fall risk with mobility. - 6/15: MET -- exceeds   Pt will demonstrate >10 SLS stability with light UE support to safely allow pt to enter/exit her tub shower. - 6/15: partially met -- light UE support for RLE, still difficulty on the L   Pt will improve time on TUG to <20s with LRAD at mod IND level to demonstrate decreased fall risk. - 6/15: progressing -- 10 second improvement to 25.5s   Pt will increase gait speed to >0.8 m/s  With LRAD at mod I level to demonstrate decreased fall risk and optimize mobility to at community level. - 6/15: limited increase in speed but pt is now mod IND with 2WW   Pt will be able to manage her walker up 1 step without instability to improve independence with access to her home. - 6/15: progressing    Pt will improve time on 5xSTS to <15 seconds with use of 1 UE to decrease fall risk and increase functional capacity for mobility. - 6/15: completes with unilateral UE support this date but time is increased. LTG: (to be met in 20 treatments)  Pt will improve score on ESCOBEDO balance scale by >/= 45/56 points  to improve overall balance stability and decrease fall risk with mobility. - 6/15: progressing -- 33/56     Pt will increase strength of all major muscle groups of the LEs to 4/5 or greater demonstrating improved strength needed to maximize safety and efficiency with mobility tasks such as gait, transfers and stairs. - 6/15: progressing   Pt will be independent with home program addressing strength, flexibility and balance to maximize gains made in therapy to improve overall functional capacity for mobility.     -6/15: progressing -- will continue to update in POC   Pt will improve score on OPTIMAL to 46/90 to demonstrate functional improvements with ADLs.   -6/15: will assess at a later time    Pt will report no falls for x6 weeks demonstrating improved safety with mobility and implementation of fall prevention strategies.     - 6/15: meeting currently with no falls noted   6. NEW GOAL 7/12: Pt will improve AROM in of cervical ROM to >60 bilaterally to improve ability to visually scan while driving. 7. NEW GOAL 7/12: Pt will be able to maintain at least a neutral cervical alignment for > 75% of session showing improved cervical strength for keeping her head up to visualize her environment. 8. NEW GOAL 7/12: Pt will demonstrate improved postural awareness with only minimal cues to correct posture throughout session to prevent undue stress to spine. * further goals may be established based on additional assessments or progress*     Patient stated Goals: to walk without a walker, improve balance     Pt. Education:  [x] Yes  [] No  [x] Reviewed Prior HEP/Ed  Method of Education: [x] Verbal  [x] Demo  [] Written  Comprehension of Education:  [x] Verbalizes understanding. [] Demonstrates understanding. [] Needs review. [] Demonstrates/verbalizes HEP/Ed previously given.      Plan: [x] Continue per POC    [] Other:          Treatment Charges: Mins Units   []  Modalities     [x]  Ther Exercise 15 1   []  Manual Therapy     []  Ther Activities     []  Aquatics     [x]  Neuromuscular 10 1   [] Vasocompression     [x] Gait Training 17 1   [] Dry needling        [] 1 or 2 muscles        [] 3 or more muscles     []  Other     Total Treatment time 42 3   * KX modifier needed*     Time In: 0976 am       Time Out:  2222 am      Electronically signed by:  Caroline Short PT

## 2022-07-21 ENCOUNTER — HOSPITAL ENCOUNTER (OUTPATIENT)
Dept: OCCUPATIONAL THERAPY | Age: 62
Setting detail: THERAPIES SERIES
Discharge: HOME | End: 2022-07-21
Payer: MEDICARE

## 2022-07-21 ENCOUNTER — HOSPITAL ENCOUNTER (OUTPATIENT)
Dept: PHYSICAL THERAPY | Age: 62
Setting detail: THERAPIES SERIES
Discharge: HOME | End: 2022-07-21
Payer: MEDICARE

## 2022-07-21 PROCEDURE — 97110 THERAPEUTIC EXERCISES: CPT

## 2022-07-21 PROCEDURE — 97530 THERAPEUTIC ACTIVITIES: CPT

## 2022-07-21 PROCEDURE — 97116 GAIT TRAINING THERAPY: CPT

## 2022-07-21 ASSESSMENT — PAIN DESCRIPTION - DESCRIPTORS: DESCRIPTORS: TIGHTNESS

## 2022-07-21 ASSESSMENT — PAIN SCALES - GENERAL: PAINLEVEL_OUTOF10: 5

## 2022-07-21 ASSESSMENT — PAIN DESCRIPTION - ORIENTATION: ORIENTATION: LEFT;RIGHT

## 2022-07-21 ASSESSMENT — PAIN DESCRIPTION - LOCATION: LOCATION: NECK;LEG;ARM

## 2022-07-21 NOTE — PROGRESS NOTES
[address]         Phone: [phone]       Fax: [phone]     Occupational Therapy Daily Treatment note     Occupational Therapy: Daily Note   Patient: Lisa Mcdonald (66 y.o. female)   Examination Date: 2022  No data recorded  Progress Note Counter: MD appt 22     :  1960 # of Visits since [facility]:   20   MRN: [id number]  CSN: [id number] Start of Care Date:    Insurance: Payor: MEDICARE / Plan: MEDICARE PART A AND B / Product Type: *No Product type* /   Insurance ID: [id number] - (Medicare) Secondary Insurance (if applicable): Insurance Information: Medicare   Referring Physician: MD Dr Jozef Arvizu   PCP: Kiko Mistry MD Visits to Date/Visits Approved:     No Show/Cancelled Appts:   /       Medical Diagnosis: Unspecified injury at unspecified level of cervical spinal cord, sequela [S14.109S]  Quadriplegia, C5-C7 incomplete [G82.54]  Cauda equina syndrome [G83.4] spinal cord injury , cervical region sequela (S14.109S), incomplete quadriplegia at C5-6 level (G82.54), cauda equina syndrome ( G83.4) - ICD-10 codes  Treatment Diagnosis: bilateral UE weakness & impaired coordination, bilateral shoulder stiffness, impaired sensation - hands lt more than rt  (M62.81, R27.9, M25.611, M25.612,R20.2        SUBJECTIVE EXAMINATION   Pain Level: Pain Screening  Patient Currently in Pain: Yes  Pain Assessment: 0-10  Pain Level: 5  Post Treatment Pain Level: 5  Pain Location: Neck, Leg, Arm  Pain Orientation: Left, Right  Pain Descriptors: Tightness    Patient Comments: Subjective: Pt states her pain radiates from the neck down to her arms/legs.     HEP Compliance: Good        OBJECTIVE EXAMINATION   Restrictions: Restrictions/Precautions: Fall Risk; General Precautions     TREATMENT     Exercises:     Treatment Reasoning    OT Exercise 1: PROM B UE,shoulders, wrist/hand, finger blocking exercises bilateral hands( PIP flex/extension 25x, DIP flex/[id number] ), stretch of left hand/wrist  OT Exercise 3: red 10# theraband flexbar 20x each way using bilateral hand: twist, make U, make upside down U, make C, make backward C  OT Exercise 10: BTE #504 B wrists for flexion/extension, T = 7  lbs. , 120 seconds (rt hand, lt hand)  OT Exercise 11: BTE #162 hand gripper, T = 25in lbs, 65 seconds(rt hand/lt hand);  pinch , T = 12  in lbs, 60 seconds. (rt hand/lt hand)  OT Exercise 15: yellow therapy ball bilateral UE exercises 20x each way  : roll ball on table (forward/back, side to side, circles cw & ccw) - pt stand , press into sides of ball 20x, press down on ball 20x  (pt standing)  OT Exercise 17: key pegs : using lt hand place all 25 pegs in board, them remove pegs & hold in lt hand. (Pt didn't drop any pegs)  OT Exercise 18: BTE tool 122 ergonometer T = 3 lb (75 seconds forward, 75 seconds backward)  OT Exercise 19: BTE tool 802  bilateral UE push /pull T = 5 lb for 90 seconds  OT Exercise 20: BTE tool 302 flat knob (RD/UD) to simulate open jar/bottle : T = 5 inlb  (rt hand 60 seconds, lt hand  60 seconds)    Limitations addressed: Strength, Coordination, Flexibility  Therapist provided: Verbal cuing  Progressed: Complexity of movement  Progressed: Progressed to BTE tools  302 & 802                   ASSESSMENT     Assessment: Assessment: Pt participates in bilateral UE & hand strengthening,fine motor dexterity, & rom/stretching. exercises. PROM bilateral UE - wfls. OT upgraded pt's bilateral UE strengthening exercises to include BTE tools 802(for push/pull to improve shoulder/hand strength)and 302 ( to improve hand strength for open/close jars/bottles) . No increase pain with upgraded exercises today. See OT exercises for detail.   Performance deficits / Impairments: Decreased ROM, Decreased strength, Decreased coordination, Decreased fine motor control, Decreased high-level IADLs, Decreased sensation    Post-Treatment Pain Level: 5    Prognosis: Good    Activity Tolerance: Patient tolerated treatment well               TREATMENT PLAN   REQUIRES OT FOLLOW-UP: Yes  Type: Outpatient  Plan  Plan Frequency: 2-3x/week  Plan Weeks: 20 visits  Specific Instructions for Next Treatment: re-eval next visit  Current Treatment Recommendations: Strengthening, ROM, Patient/Caregiver education & training, Coordination training, Positioning  Plan Comment: continue OT       Therapy Time  Individual Time In: 1115  Individual Time Out: 1200  Minutes: 45  Timed Code Treatment Minutes: 45 Minutes       Electronically signed by Markel Ziegler OT  on 7/21/2022 at 2:10 PM       Treatment Charges: Mins Units Time In/Out   [] Evaluation       []  Low       []  Moderate       []  High      []  Electrical Stim      []  Iontophoresis      []  Paraffin      []  Ultrasound        []  Masage      []  Neuromuscular Re-ed      []  ADL      [x]  Ther Exercise 45 3    []  Ther Activities      []  Neuro Re-Ed      []  Splinting      []  Other      Total Treatment time 45 min 3          POC NOTE

## 2022-07-21 NOTE — PROGRESS NOTES
(B) Shoulders/UEs to knuckles in hands (B) LE's int feet and toes  Pain Rating: (0-10 scale) 5/10  Pain altered Tx:  [x] No  [] Yes  Action:  Comments:     Objective:  Tests and measures  7/21/22  Outcome Measures 5/10/22  6/15/22 7/21/22    ESCOBEDO 19/56  33/56 38/56 (5 pt improvement)    5xSTS 19. .5   27.46s -- with only 1 UE support  37.09s w/o UE support   15.09s w/ 1UE support      10mWT 0.48m/s  20.35s >> 0.49 m/s with 2WW  15.7s with 2WW >> 0.67m/s  21.75s with quad cane >> 0.46m/s    Endurance   4:30 with 2WW, reaching 380ft  Will assess next session    TUG  35.5 s with 2WW, CGA   25.5s with 2WW, mod IND  16.5s with 2WW  23.0s with quad cane   SLS L     w/o UE support 2s   W/ mod UE support 10s  w/o UE support 2s   W/ min UE support 10s    SLS R     wlo UE support 2s   W/ light UE support 10s  wlo UE support 3s   W/ light UE support 20s      in sitting   Strength  Left Strength  Right   Hip Flexion 3+ 4+   Hip Abduction 4 4+   Hip Adduction 4 5   Hip Extension  4 4    Hip ER       Hip IR       Knee Flexion 3+ 5   Knee Extension 4 4+   Dorsiflexion 4 4   Plantar flexion 4 5   Inversion 3+  5   Eversion 3  4        06/15/22 1224   Escobedo Balance Scale   1. Sitting to Standing 3   2. Standing Unsupported 3   3. Sitting with Back Unsupported but Feet Supported on Floor or on a Stool 4   4. Standing to Sitting 3   5. Transfers 3   6. Standing Unsupported with Eyes Closed 3   7. Standing Unsupported with Feet Together 3   8. Reach Forward with Outstretched Arm While Standing 2   9.  Object from Floor from a Standing Position 2   10. Turning to Look Behind Over Left and Right Shoulders While Standing 2   11. Turn 360 Degrees 1   12. Place Alternate Foot on Step or Stool While Standing Unsupported 1   13. Standing Unsupported One Foot in [address] 2   14.  Standing on One Leg 1   Escobedo Balance Score 33   Escobedo Balance Disability Index 40-59%   Escobedo CMS Modifier CK%        Exercises/Interventions:   - removed AFO and [place] 2-3\" to raise and lower 10x  2 sets  No UE support  CGA    Forced WB on 4\" step + 1/2 ball 2x30\" each    Encouraged light touch; WB L LE more difficult than R LE   Slow high march w/ opposite UE raise 10x2 ea 3# L LE      Heel/toe raises 10x2        Semi tandem Stance 3x30\"    NO UE Support, CGA    EC Foam feet Normal Stance 2x30\"    NO UE Support   EO/ EC Foam Feet touching 3x30''    NO UE support  Alternated EO/EC   Standing on foam with press forward & press up 2x10 4# ball      Feet together Eye Closed Firm surface 2x30\"     NO UE Support- cues at hips as weight is shifted over LLE     Toe taps 6\" step + 1/2 ball 10x2 ea 2#   Unilateral UE support   Basil step overs w/ dual tasking 2x15 6 in basil   UE support with quad cane and pt naming nominal categories for dual tasking challenge                                                   GAIT TRAINING           Ambulation with SBQC x75ft     SBA -- cues for heel contact to get larger step lengths    Ambulation with SPC 2x75ft    CGA -- RPS 6/10, multiple balance checks in initial laps    Straight leg kicks with SBQC 2x30ft     CGA   Backwards walking with SBQC 2x30ft     CGA -- Tactile cues for posture   Walking with horizontal head turns 2x20ft    Moderate instability   Walking with vertical head turns 2x20ft    Moderate instability   Marching with SBQC 2x20ft     CGA   Curb steps with SBQC x8 4\"   CGA to [place] -- trialed with ea leg leading ascending and descending   ambulation with quad cane and ankle wt [id number] 3# aw   CGA-- focusing on larger step lengths               Other:    Specific Instructions for next treatment: Work on ambulation with SPC-- take a 6mWT measure with SPC, Complete optimal that is in soft chart -- Add in postural strengthening and L ankle strengthening; consider adding KT tape to L ankle to reduce foot drop. Work on balance and strength with eyes closed to improve proprioception      Assessment:    [x] Progressing toward goals.  Pt was initially evaluated 5/22/22 due to impaired strength, balance, and mobility after multiple spinal issues. She has now completed x19 total visits and showing great progress. In recent sessions have been working on progressing to quad cane/SPC with mobility. Completed all gait outcomes this date with 2WW and quad cane to see where she is at with ea device. She improves in gait speed by 0.18m/s and is progressing towards community levels with 2WW. With quad cane she is improving in speeds to near levels she was reaching with 2WW at last progress note. On TUG she surpasses her previous time with both a 2WW and quad cane. Demos a good time of 16.5s with 2WW which surpasses a fall risk cut off with AD. She is showing improved power with her transfers as she now can complete a STS from a regular chair with no UE support. Even with 1UE support she has progressed improving time on 5xSTS to 15.09s which is a 12 sec improvement since last progress note. On the ESCOBEDO she again continues to improve to 38/56 (5 pt improvement) but would still be a fall risk, A significant improvement noted this PN is the activation she is getting in her L ankle. She is >3/5 for all major muscle groups so decided to forego wearing her AFO for the remainder of the session and she did well with no toe drag. She does get some excessive inversion with swing but feel it will improve as she increases eversion strength. Overall she has been progressing well. She still has mild strength deficits, decreased endurance, impaired balance (specifically higher level), is a fall risk, and still having pain but feel she will continue to improve with PT as she has since last progress note.  Due to progress towards all goals and new goals being recently added for posture and cervical strengthening  will be extending POC to x30 visits and will re-assess throughout to ensure she is still progressing towards goals and being safer and efficient with all functional mobility. [] No change. [] Other:    [x] Patient would continue to benefit from skilled physical therapy services in order to: improve strength,mobiltiy and motor control that impacts balance and mobility. GOALS -- assessed 6/15/22 & 7/21/22  STG: (to be met in 10 treatments)   Pt will improve score on ESCOBEDO balance scale by >/=6 points to improve overall balance stability and decrease fall risk with mobility. - 6/15: MET -- exceeds  Pt will demonstrate >10 SLS stability with light UE support to safely allow pt to enter/exit her tub shower. - 6/15: partially met -- light UE support for RLE, still difficulty on the L   - 7/21: progressing -- minUE support for stability on LLE   Pt will improve time on TUG to <20s with LRAD at mod IND level to demonstrate decreased fall risk. - 6/15: progressing -- 10 second improvement to 25.5s    - 7/21: MET with 2WW reaching 15.7s; will continue goal to see if she can achieve with cane   Pt will increase gait speed to >0.8 m/s  With LRAD at mod I level to demonstrate decreased fall risk and optimize mobility to at community level. - 6/15: limited increase in speed but pt is now mod IND with 2WW   - 7/21: progressing-- significant improvement in gait speed with 2WW to 0.67m/s & 0.46m/s with SPC   Pt will be able to manage her walker up 1 step without instability to improve independence with access to her home. - 6/15: progressing   - 7/21: MET    Pt will improve time on 5xSTS to <15 seconds with use of 1 UE to decrease fall risk and increase functional capacity for mobility. - 6/15: completes with unilateral UE support this date but time is increased. - 7/21: progressing -- reaches 15.09s with 1 UE support   LTG: (to be met by end of POC )  Pt will improve score on ESCOBEDO balance scale by >/= 45/56 points  to improve overall balance stability and decrease fall risk with mobility.         - 6/15: progressing -- 33/56     - 7/21: progressing -- improves score ot 38/56   Pt will increase strength of all major muscle groups of the LEs to 4/5 or greater demonstrating improved strength needed to maximize safety and efficiency with mobility tasks such as gait, transfers and stairs. - 6/15: progressing  -7/21; Progressing -- great progress made with LE strength, especially L ankle strength now all being above 3/5  Pt will be independent with home program addressing strength, flexibility and balance to maximize gains made in therapy to improve overall functional capacity for mobility.     -6/15: progressing -- will continue to update in POC  -7/21: progressing -- will continue to update in POC  Pt will improve score on OPTIMAL to 46/90 to demonstrate functional improvements with ADLs.   -6/15: will assess at a later time    - 7/21: will assess next session   Pt will report no falls for x6 weeks demonstrating improved safety with mobility and implementation of fall prevention strategies. - 6/15: meeting currently with no falls noted  - 7/21; MET -- no falls noted, verbalizes awareness of strategies to decrease falls   6. NEW GOAL 7/12: Pt will improve AROM in of cervical ROM to >60 bilaterally to improve ability to visually scan while driving.   -3/61; Progressing   7. NEW GOAL 7/12: Pt will be able to maintain at least a neutral cervical alignment for > 75% of session showing improved cervical strength for keeping her head up to visualize her environment.   -7/21: progressing   8. NEW GOAL 7/12: Pt will demonstrate improved postural awareness with only minimal cues to correct posture throughout session to prevent undue stress to spine.    -7/21; Progressing   * further goals may be established based on additional assessments or progress*         Patient stated Goals: to walk without a walker, improve balance     Pt.  Education:  [x] Yes  [] No  [x] Reviewed Prior HEP/Ed  Method of Education: [x] Verbal  [x] Demo  [] Written  Comprehension of Education:  [x]

## 2022-07-26 ENCOUNTER — CARE COORDINATION (OUTPATIENT)
Dept: CARE COORDINATION | Age: 62
End: 2022-07-26

## 2022-07-26 NOTE — CARE COORDINATION
Left VM message asking patient to call me back at [phone] for care management call. Will call again next week for final attempt. She has not returned calls.

## 2022-07-27 ENCOUNTER — HOSPITAL ENCOUNTER (OUTPATIENT)
Dept: OCCUPATIONAL THERAPY | Age: 62
Setting detail: THERAPIES SERIES
Discharge: HOME | End: 2022-07-27
Payer: MEDICARE

## 2022-07-27 ENCOUNTER — HOSPITAL ENCOUNTER (OUTPATIENT)
Dept: PHYSICAL THERAPY | Age: 62
Setting detail: THERAPIES SERIES
Discharge: HOME | End: 2022-07-27
Payer: MEDICARE

## 2022-07-27 PROCEDURE — 97116 GAIT TRAINING THERAPY: CPT

## 2022-07-27 PROCEDURE — 97110 THERAPEUTIC EXERCISES: CPT

## 2022-07-27 PROCEDURE — 97112 NEUROMUSCULAR REEDUCATION: CPT

## 2022-07-27 ASSESSMENT — PAIN SCALES - GENERAL: PAINLEVEL_OUTOF10: 5

## 2022-07-27 ASSESSMENT — PAIN DESCRIPTION - DESCRIPTORS: DESCRIPTORS: TIGHTNESS;TINGLING

## 2022-07-27 ASSESSMENT — 9 HOLE PEG TEST
TESTTIME_SECONDS: 27
TESTTIME_SECONDS: 21
TEST_RESULT: FUNCTIONAL

## 2022-07-27 ASSESSMENT — PAIN DESCRIPTION - LOCATION: LOCATION: ARM;LEG;NECK

## 2022-07-27 ASSESSMENT — PAIN DESCRIPTION - ORIENTATION: ORIENTATION: LEFT;RIGHT

## 2022-07-27 ASSESSMENT — PAIN DESCRIPTION - PAIN TYPE: TYPE: ACUTE PAIN

## 2022-07-27 NOTE — PROGRESS NOTES
Occupational TherapyRe-Evaluation   Patient: Talia Isaacs (52 y.o. female)   Examination Date:   Plan of Care Certification Period: 2022 to    Progress Note Counter: MD appt 22   :  1960  MRN: [id number]  CSN: [id number]   Insurance: Payor: MEDICARE / Plan: MEDICARE PART A AND B / Product Type: *No Product type* /   Insurance ID: [id number] - (Medicare) Secondary Insurance (if applicable): Insurance Information: Medicare   Referring Physician: MD Dr Malik Gross   PCP: eBth Lott MD Visits to Date/Visits Approved:       No Show/Cancelled Appts:   /       Medical Diagnosis: Unspecified injury at unspecified level of cervical spinal cord, sequela [S14.109S]  Quadriplegia, C5-C7 incomplete [G82.54]  Cauda equina syndrome [G83.4] spinal cord injury , cervical region sequela (S14.109S), incomplete quadriplegia at C5-6 level (G82.54), cauda equina syndrome ( G83.4) - ICD-10 codes  Treatment Diagnosis: bilateral UE weakness & impaired coordination, bilateral shoulder stiffness, impaired sensation - hands lt more than rt  (M62.81, R27.9, M25.611, M25.612,R20.2         PERTINENT MEDICAL HISTORY          SUBJECTIVE EXAMINATION      ,           Subjective History:    Subjective: Pt reports pain in neck radiates down to arms/legs.   Additional Pertinent Hx (if applicable):  C5 CORPECTOMY, USE OF INTRAOPERATIVE CERVICAL TRACTION ,2022 POSTERIOR FIXATION C2-C7 ,2021 LUMBAR LAMINECTOMY L2-S1            Pain Screening    Pain Screening  Patient Currently in Pain: Yes  Pain Assessment: 0-10  Pain Level: 5  Post Treatment Pain Level: 5  Pain Type: Acute pain  Pain Location: Arm, Leg, Neck  Pain Orientation: Left, Right  Pain Descriptors: Tightness, Tingling (tingling in fingers)    Functional Status         OBJECTIVE EXAMINATION        22 1045   Sensation   Light Touch Partial deficits in the LUE  (just a little difficulty)   Stereognosis Partial deficits in the RUE;Partial deficits in the LUE  (little difficulty rt hand/lt hand. Lt hand pt identifies block, bottle cap, paper clip, nut, coin & can't identify pen cap. Rt hand pt identifies rubber band, coin, bean, paper clip,  piece, & can't identify  marble)   Additional Comments Tingling bilateral fingers .      Left AROM  Right AROM      LUE AROM (degrees)  LUE General AROM: Elbow, forearm,wrist - wfls  L Shoulder Flexion 0-180: 145  L Shoulder ABduction 0-180: 170  L Shoulder Int Rotation  0-70: 78  L Shoulder Ext Rotation  0-90: 80  Left Hand AROM (degrees)  Left Hand General AROM: flex,extension, abduction/adduction - wfls  L Thumb Opposition: wfls RUE AROM (degrees)  RUE General AROM: Elbow, forearm, wrist - wfls  R Shoulder Flexion 0-180: 158  R Shoulder ABduction 0-180: 170  R Shoulder Int Rotation  0-70: 70  R Shoulder Ext Rotation 0-90: 90 - wlf  Right Hand AROM (degrees)  Right Hand AROM: WFL  Right Hand General AROM: flex,extension, abduction/adduction - wfls  R Thumb Opposition: wfls       Left PROM  Right PROM      LUE PROM (degrees)  LUE General PROM: elbow, wrist, hand - wfls  L Shoulder Flex  0-180: wfl  L Shoulder ABduction 0-180: wfl  L Shoulder Int Rotation  0-70: wfl  L Shoulder Ext Rotation  0-90: wfl RUE PROM (degrees)  RUE PROM: WFL  RUE General PROM: elbow, forearm, wrist., hand - wfls  R Shoulder Flex  0-180: wfl  R Shoulder ABduction 0-180: wfl  R Shoulder Int Rotation  0-70: wfl  R Shoulder Ext Rotation  0-90: wfl       Left Strength  Right Strength      LUE Strength  L Shoulder Flex: 5/5  L Shoulder ABduction: 4+/5  L Shoulder Int Rotation: 4/5  L Shoulder Ext Rotation: 4-/5  L Elbow Flex: 5/5  L Elbow Ext: 5/5  L Forearm Pron: 4-/5  L Forearm Sup: 4-/5  L Wrist Flex: 4-/5  L Wrist Ext: 5/5  L Hand General:  (extension 4-/5, flexion 4/5)    RUE Strength  R Shoulder Flex: 5/5  R Shoulder ABduction: 4+/5  R Shoulder Int Rotation: 4-/5  R Shoulder Ext Rotation: 4-/5  R Elbow Flex: 5/5  R Elbow Ext: 5/5  R Forearm Pron: 4/5  R Forearm Sup: 4/5  R Wrist Flex: 4/5  R Wrist Ext: 5/5  R Hand General:  (extension 4-/5, flexion 4/5)         Left  Right     Strength  Handle Setting 2: averagae 46. 67# - increase   Trial 1: 45  Trial 2: 50  Trial 3: 45  Average: 46.67 Handle Setting 2: average 66.67# -increase   Trial 1: 70  Trial 2: 65  Trial 3: 65  Average: 66.67   Pinch Strength (if applicable) Left Hand Strength - Lateral Pinch (lbs)  Trial 1: 18  Trial 2: 17  Trial 3: 14  Average: 16.33  Left Hand Strength - Farrell (lbs)  Trial 1: 12  Trial 2: 13  Trial 3: 13  Average: 12.67  Left Hand Strength - Tip (lbs)  Trial 1: 8  Trial 2: 7  Trial 3: 8  Average: 7.67 Right Hand Strength - Lateral Pinch (lbs)  Trial 1: 10  Trial 2: 11  Trial 3: 12  Average: 11  Right Hand Strength - Farrell (lbs)  Trial 1: 12  Trial 2: 12  Trial 3: 12  Average: 12  Right Hand Strength - Tip  (lbs)  Trial 1: 10  Trial 2: 9  Trial 3: 10  Average: 9.67   Fine Motor Skills:   Fine Motor Skills  Left 9 Hole Peg Test Time (secs): 27  Right 9-Hole Peg Test: Functional  Right 9 Hole Peg Test Time (secs): 21     Outcome Measure(s) Completed:   Upper Extremity Functional Index   Today, do you or would you have any difficulty at all with:   Any of your usual work, housework, or school activities[de-identified] A little bit of difficulty  Your usual hobbies, re creational or sporting activities[de-identified] Moderate difficulty (hobbies)  Lifting a bag of groceries to waist level[de-identified] A little bit of difficulty  Lifting a bag of groceries above your head[de-identified] Moderate difficulty  Grooming your hair[de-identified] Moderate difficulty (It's hard to put barrets in hair)  Pushing up on your hands (eg from bathtub or chair):: A little bit of difficulty (sometimes a little & sometimes no difficulty)  Preparing food (eg peeling, cutting):: A little bit of difficulty  Driving[de-identified] Extreme difficulty or unable to perform activity (Pt not driving)  Vacuuming, sweeping or raking[de-identified] Extreme difficulty or unable to perform activity (unable to vacuum/sweep - pt needs to use walker)  Dressing[de-identified] No difficulty  Doing up buttons[de-identified] No difficulty  Using tools or appliances[de-identified] No difficulty (use of appliances)  Opening doors[de-identified] No difficulty  Cleaning[de-identified] Moderate difficulty  Tying or lacing shoes[de-identified] No difficulty  Sleeping[de-identified] No difficulty  Laundering clothes (eg washing, ironing, folding):: Moderate difficulty (Pt has started to do the laundry. Difficulty getting items from dryer - pt uses reacher)  Opening a jar[de-identified] No difficulty (no difficulty with already open jar)  Throwing a ball[de-identified] No difficulty  Carrying a small suitcase with your affected limb[de-identified] A little bit of difficulty (Pt would have to use her walker & it depends on wt in bag.)  UEFI Total Score: 57  UEFI Total Percentage: 71.25 %     Total Score (out of 80) - if applicable: 57   Current Percentage of Function: 71.25 % % (Date: 7/27/2022)    Interpretation of Score: The final UEFI score ranges between 0 and 80 points. Scores closer to 0 indicate severe limitation whilst scores closer to 80 indicate very little to no limitation. The significant change (Madhuri Preethi Ultramar 112) between two subsequent evaluations is set at 9 points. Higher Score indicates less disability, more function. ASSESSMENT     Impression: Assessment: Pt making progress with independence & less difficulty performing some of her IADL/ADLS. Pt demo improving bilateral UE strength but continues with wearkness rt shoulder (IR & ER) & rt hand extension, lt UE (shoulder (ER), supination,wrist flexion, hand extension). Pt demo improved bilateral hand fine motor skills & improved strereognosis. Pt continues with tingling in her fingers. Pt will benefit from continued skilled OT to improve functional skills, increase bilateral UE /hand strength) for daily activity participation.     Performance Deficits/Impairments: Decreased ROM, Decreased strength, Decreased coordination, Decreased fine motor control, Decreased high-level IADLs, Decreased sensation    Statement of Medical Necessity: Occupational Therapy is both indicated and medically necessary as outlined in the POC to increase the likelihood of meeting the functionally related goals stated below. Patient's Activity Tolerance: Patient tolerated treatment well      Patient's rehabilitation potential/prognosis is considered to be: Good          GOALS      07/27/22 1419   Patient Goals    Patient goals  To use her arms /hands as best she can to function. Pt also wants to improve her walking & will be getting PT for that goal. Pt making progress for arm/hand goalsGOa   Short Term Goals   Short Term Goal 1 Pt will demo & verbalize independence with bilateral UE HEP   STG Goal 1 Status: Met   Short Term Goal 2 Pt will report improving independence & less difficulty with fine motor activities. Pt will complete 9 hole peg test 4 seconds quicker with rt hand, & 5 seconds quicker with lt hand. STG Goal 2 Status: Met   Short Term Goal 3 Increase bilateral hand strength for daily activities: increase rt ( by 5# & tip pinch by 2#), increase lt ( by 5#, lateral & tip pinch by 2#)   STG 3 Current Status: Goal Met bilateral . lt lateral & tip pinch. . Goal Ongoing rt tip pinch. STG Goal 3 Status: Met; In progress   Short Term Goal 4 Increase bilateral shoulder functional AROM by 10 : lt shoulder (fleixon, abduction, IR,ER), & rt shoulder (flexion, abduction, ER ) to improve reaching for selfcare & reach into cabinets. STG 4 Current Status: Goal Met lt shoulder (flexion, abdcution , ER) &  rt shoulder abduction & ER . Goal Ongoing for lt shoulder IR, & rt shoulder flexion. STG Goal 4 Status: Partially met; Not Met; In progress   Long Term Goals   Long Term Goal 1 Using the UEFI pt will report a score of 3 (little difficulty) with doing light housework, hair grooming, lifting groceries to waist ht, cut/peeling food, getting dressing, button manipulation. LTG 1 Current Status: Total UEFI score  on neval was 44  & today score is 57. Pt making progress. Pt meeting above goals (light housework, lift groceries, cut/peeling food, getting dressed, button manipulation). Hair grooming - Goal ongoing. Long Term Goal 2 Pt will verbalize increase sensory awareness with lt hand: increase sterognosis accuracy to 50% (identify 4 out of 8 objects correctly)   LTG 2 Current Status: Pt correctly identify 5 out 6 itmes with each hand which is 83.33% correct. LTG Goal 2 Status: Met   Long Term Goal 3 compared to eval increase PROM: rt shoulder flexion & IR by 10, lt shoulder flexion, abduction, IR by 10   LTG 3 Current Status: Goal Met rt/lt shoulder PROM. LTG Goal 3 Status: Met   Long Term Goal 4 Increase bilateral UE strength to 4/5 : rt  UE shoulder (flexion,abduction, IR,ER),wrist & hand extension, lt UE shoulder (flexion,abduction,ER),supination,wrist (flexion,extension),& hand extension to improve strength for selfcare & light home task. Estil Strickland LTG 4 Current Status: Goal Met rt UE: shoulder (flexion, abduction) wrist extension, lt UE: shoulder (flexion, abdcution), wrist extension . Goal Ongoing rt UE ( shoulder IR & ER, hand extension) & lt UE shoulder ER, supination, wrist flexion , hand extension. LTG Goal 4 Status: Met; In progress   Long Term Goal 5 Compared to eval increase lt hand strength:  by 10# & tip pinch by 4, & improve lt hand fine motor dexterity by 10 seconds   LTG 5 Current Status: Goal Met lt hand 9 hole peg test, lt  (increased by 15# since eval. Goal Partially Met lt tip pinch - Goal ongoing. LTG Goal 5 Status: Met; In progress;Partially met   Long Term Goal 6 compared to eval increase AROM lt shoulder (flexion & abduction) by 15-20   LTG 6 Current Status: Goal Met shoulder flexion & abduction.    LTG Goal 6 Status: Met       TREATMENT PLAN       REQUIRES OT FOLLOW-UP: Yes  Type: Outpatient  Plan Comment: Recomend continue OT    Pt. [address] [facility]  Dept: [phone]  Dept Fax: [phone]  Loc: [phone]       POC NOTE      Treatment Charges: Mins Units Time In/Out   [] Evaluation       []  Low       []  Moderate       []  High      []  Electrical Stim      []  Iontophoresis      []  Paraffin      []  Ultrasound        []  Masage      []  Neuromuscular Re-ed      []  ADL      [x]  Ther Exercise 31 2    []  Ther Activities      []  Neuro Re-Ed      []  Splinting      [x]  OT Re-eval  15 0    Total time 46 min

## 2022-07-27 NOTE — FLOWSHEET NOTE
[address] [facility]   [id number] [facility] [address]   Phone: [phone]   Fax: [phone]    Physical Therapy Daily Treatment Note      Date:  2022  Patient Name:  Mitchell Martinez    :  1960  MRN: [id number]  Physician: Kentrell Gonzalez MD                            Insurance: Medicare - based on [place] [facility] [place]:   A60.370F (ICD-10-CM) - Spinal cord injury, cervical region, sequela ([facility]   G82.54 (ICD-10-CM) - Incomplete quadriplegia at C5-6 level ([facility]   G83.4 (ICD-10-CM) - Cauda equina syndrome ([facility]   Rehab Codes: R25 pain , R53.1 weakness, Z74.0 reduced mobility, R29.6 high fall risk, R27.8 lack of coordination, R26.89 gait abnormality   Date of symptom onset: 21 -- cauda equina; 22 Cervical surgeries   Next 's appt. : 22 with Neurosurgery; 22 with PMR Dr. Eric Aguiar   Total Visits:   Cx/Ns:0/0   Date of initial visit: 22        Date of PN: 22 (visit 10)        Precautions: can now wean out of cervical collar; not lifting greater than a gallon of milk: Left AFO      Subjective:  Patient arrives with 2WW and without cervical collar. Patient states her L ankle has been swelling and feeling slightly more painful, as well as feeling more unsteady today. New ankle stabilizing brace brought in by patient requesting education on donning/doffing brace.      Pain:  [x] Yes  [] No Location: Neck down to (B) Shoulders/UEs to knuckles in hands (B) LE's int feet and toes    Pain Rating: (0-10 scale) 5/10  Pain altered Tx:  [x] No  [] Yes  Action:  Comments:       Objective:   Exercises:  Exercise Reps/ Time Weight/ Level Completed  Today Comments   nustep  5' Level 4  B UE/LE           Cervical stretching        Upper trap stretch  2x30s ea    Self over pressure    Levator scap stretch  2x30s ea    Self over pressure    Rotation stretch with towel  5x5s ea       Doorway pec stretch  2x30s             Postural strengthening        Seated rows  2x15 Red      Seated horizontal ABd  2x15 Red      Seated chin tucks  2x10 Hold 3s   Tactile cues for appropriate activation    Standing resisted rotations  X10 ea  Red   Good challenge for balance           MAT LEVEL     Elevated with large wedge and 2 pillows    SKTC  2x30s ea    L ONLY    HS stretch  2x30s ea    L ONLY    Figure 4 stretch  2x30s ea   L ONLY    glute bridges with band  x15 EO   x15 EC  Red   Felt increased challenge with eyes closed due to proprioceptive deficits    Isolated hooklying clams   2x15 ea  Green    Isolated makes non moving limb stabilize                  Standing       Resisted STS    2x10   Green   From mat table    Sit to stand without hands  2x10   CGA to Miryam  in front to encourage anterior lean; cues for eccentric control   10x at lowest mat, 10x at chair level     Step ups Fwd/Lat 10x2 6\", 2#  unilateral UE support    3 way hip w/ eyes closed  10x ea  2 sets 3# aw  With BUE support, eyes closed to build proprioceptive input     Slow Controlled Marches 2-3\" to raise and lower 10x  2 sets  No UE support   CGA    Forced WB on 4\" step + 1/2 ball 2x30\" each   Encouraged light touch; WB L LE more difficult than R LE   Slow high march w/ opposite UE raise 10x2 ea 3# L LE     Heel/toe raises  10x2      Semi tandem Stance  3x30\"   NO UE Support, CGA    EC Foam feet Normal Stance 2x30\"    NO UE Support   EO/ EC Foam Feet touching 3x30''   NO UE support  Alternated EO/EC   Standing on foam with press forward & press up  2x10 4# ball      Feet together Eye Closed Firm surface 2x30\"    NO UE Support- cues at hips as weight is shifted over LLE     Toe taps 6\" step + 1/2 ball 10x2 ea 2#  Unilateral UE support    Basil step overs w/ dual tasking  2x15 6 in basil  UE support with quad cane and pt naming nominal categories for dual tasking challenge                                GAIT TRAINING        Ambulation with SBQC  x75ft  x SBA -- cues for heel contact ot 38/56   Pt will increase strength of all major muscle groups of the LEs to 4/5 or greater demonstrating improved strength needed to maximize safety and efficiency with mobility tasks such as gait, transfers and stairs. - 6/15: progressing  -7/21; Progressing -- great progress made with LE strength, especially L ankle strength now all being above 3/5  Pt will be independent with home program addressing strength, flexibility and balance to maximize gains made in therapy to improve overall functional capacity for mobility.     -6/15: progressing -- will continue to update in POC  -7/21: progressing -- will continue to update in POC  Pt will improve score on OPTIMAL to 46/90 to demonstrate functional improvements with ADLs.   -6/15: will assess at a later time    - 7/21: will assess next session   Pt will report no falls for x6 weeks demonstrating improved safety with mobility and implementation of fall prevention strategies. - 6/15: meeting currently with no falls noted  - 7/21; MET -- no falls noted, verbalizes awareness of strategies to decrease falls   6. NEW GOAL 7/12: Pt will improve AROM in of cervical ROM to >60 bilaterally to improve ability to visually scan while driving.              -7/21; Progressing   7. NEW GOAL 7/12: Pt will be able to maintain at least a neutral cervical alignment for > 75% of session showing improved cervical strength for keeping her head up to visualize her environment.              -7/21: progressing   8. NEW GOAL 7/12: Pt will demonstrate improved postural awareness with only minimal cues to correct posture throughout session to prevent undue stress to spine.               -7/21; Progressing   * further goals may be established based on additional assessments or progress*         Patient stated Goals: to walk without a walker, improve balance     Pt.  Education:  [x] Yes  [] No  [x] Reviewed Prior HEP/Ed  Method of Education: [x] Verbal  [x] Demo  [] Written  Comprehension of Education:  [x] Verbalizes understanding. [] Demonstrates understanding. [] Needs review. [] Demonstrates/verbalizes HEP/Ed previously given.      Plan: [x] Continue per POC    [] Other:          Treatment Charges: Mins Units   []  Modalities     []  Ther Exercise     []  Manual Therapy     []  Ther Activities     []  Aquatics     [x]  Neuromuscular 19 1   [] Vasocompression     [x] Gait Training 30 2   [] Dry needling        [] 1 or 2 muscles        [] 3 or more muscles     []  Other     Total Treatment time 49 3   * KX modifier needed*     Time In: 2234 am       Time Out:  5130 pm      Electronically signed by:  Alvina Prieto PTA

## 2022-07-29 ENCOUNTER — HOSPITAL ENCOUNTER (OUTPATIENT)
Dept: PHYSICAL THERAPY | Age: 62
Setting detail: THERAPIES SERIES
Discharge: HOME | End: 2022-07-29
Payer: MEDICARE

## 2022-07-29 ENCOUNTER — HOSPITAL ENCOUNTER (OUTPATIENT)
Dept: OCCUPATIONAL THERAPY | Age: 62
Setting detail: THERAPIES SERIES
Discharge: HOME | End: 2022-07-29
Payer: MEDICARE

## 2022-07-29 PROCEDURE — 97110 THERAPEUTIC EXERCISES: CPT

## 2022-07-29 ASSESSMENT — PAIN DESCRIPTION - PAIN TYPE: TYPE: ACUTE PAIN;CHRONIC PAIN

## 2022-07-29 ASSESSMENT — PAIN DESCRIPTION - DESCRIPTORS: DESCRIPTORS: TIGHTNESS;TINGLING

## 2022-07-29 ASSESSMENT — PAIN SCALES - GENERAL: PAINLEVEL_OUTOF10: 5

## 2022-07-29 ASSESSMENT — PAIN DESCRIPTION - ORIENTATION: ORIENTATION: RIGHT;LEFT

## 2022-07-29 ASSESSMENT — PAIN DESCRIPTION - LOCATION: LOCATION: NECK;ARM;LEG

## 2022-07-29 NOTE — FLOWSHEET NOTE
Hold 3s  x Tactile cues for appropriate activation    Standing resisted rotations  2x10 ea  green x Good challenge for balance           MAT LEVEL     Elevated with large wedge and 2 pillows    SKTC  2x30s ea    L ONLY    HS stretch  2x30s ea    L ONLY    Figure 4 stretch  2x30s ea   L ONLY    glute bridges with band  x15 EO   x15 EC  Red   Felt increased challenge with eyes closed due to proprioceptive deficits    Isolated hooklying clams   2x15 ea  Green    Isolated makes non moving limb stabilize    4 way ankle 2x15e  yellow x           Standing       Resisted STS    2x10   Green   From mat table    Sit to stand without hands  2x10   CGA to Miryam  in front to encourage anterior lean; cues for eccentric control   10x at lowest mat, 10x at chair level     Step ups Fwd/Lat 10x2 6\", 2#  unilateral UE support    3 way hip w/ eyes closed  10x ea  2 sets 3# aw  With BUE support, eyes closed to build proprioceptive input     Slow Controlled Marches 2-3\" to raise and lower 10x  2 sets  No UE support   CGA    Forced WB on 4\" step + 1/2 ball 2x30\" each   Encouraged light touch; WB L LE more difficult than R LE   Slow high march w/ opposite UE raise 10x2 ea 3# L LE     Heel/toe raises  10x2      Semi tandem Stance  3x30\"   NO UE Support, CGA    EC Foam feet Normal Stance 2x30\"    NO UE Support   EO/ EC Foam Feet touching 3x30''   NO UE support  Alternated EO/EC   Standing on foam with press forward & press up  2x10 4# ball      Feet together Eye Closed Firm surface 2x30\"    NO UE Support- cues at hips as weight is shifted over LLE     Toe taps 6\" step + 1/2 ball 10x2 ea 2#  Unilateral UE support    Basil step overs w/ dual tasking  2x15 6 in basil  UE support with quad cane and pt naming nominal categories for dual tasking challenge    Resisted side stepping  3x15 ea yellow x HHA with therapist                         GAIT TRAINING        Ambulation with SBQC  x75ft   SBA -- cues for heel contact to get larger step lengths Ambulation with SPC  2x75ft   CGA -- RPS 6/10, multiple balance checks in initial laps    Straight leg kicks with SBQC 2x30ft    CGA    Backwards walking with SBQC  4x20ft    CGA -- Tactile cues for posture    Walking with horizontal head turns  2x20ft    Moderate instability    Walking with vertical head turns  2x20ft    Moderate instability    Marching with SBQC  2x20ft    CGA   Curb steps with SBQC  x8 4\"  CGA to Miryam -- trialed with ea leg leading ascending and descending    ambulation with quad cane and ankle wt  [id number] 3# aw   CGA-- focusing on larger step lengths           6mWT    460ft 7/27/22   Other:   7/29: 5' spent  on educating on the brace and how to don. Then ambulated x90ft with SPC to ensure straps donned appropriately. Specific Instructions for next treatment: Work on ambulation with SPC-- Complete optimal that is in soft chart -- Add in postural strengthening and L ankle strengthening; consider adding KT tape to L ankle to reduce foot drop. Work on balance and strength with eyes closed to improve proprioception      Assessment:    [x] Progressing toward goals. Spent initial time on education and ensuring her laced up ankle brace is appropriate to control for inversion with swing phase. Wore for remainder of session. Due to increased ankle activation worked on building strength with 4 way ankle. Also added resisted side stepping for hip strengthening. Pt notes soreness/burning feelings in lower extremities so did more cervical and postural strengthening. Progressed resistance for more postural strengthening. Pt is appropriately fatigued from session. [] No change. [] Other:    [x] Patient would continue to benefit from skilled physical therapy services in order to: improve strength,mobiltiy and motor control that impacts balance and mobility.      GOALS -- assessed 6/15/22 & 7/21/22  STG: (to be met in 10 treatments)   Pt will improve score on ESCOBEDO balance scale by >/=6 points to improve overall balance stability and decrease fall risk with mobility. - 6/15: MET -- exceeds  Pt will demonstrate >10 SLS stability with light UE support to safely allow pt to enter/exit her tub shower. - 6/15: partially met -- light UE support for RLE, still difficulty on the L        - 7/21: progressing -- minUE support for stability on LLE   Pt will improve time on TUG to <20s with LRAD at mod IND level to demonstrate decreased fall risk. - 6/15: progressing -- 10 second improvement to 25.5s         - 7/21: MET with 2WW reaching 15.7s; will continue goal to see if she can achieve with cane   Pt will increase gait speed to >0.8 m/s  With LRAD at mod I level to demonstrate decreased fall risk and optimize mobility to at community level. - 6/15: limited increase in speed but pt is now mod IND with 2WW        - 7/21: progressing-- significant improvement in gait speed with 2WW to 0.67m/s & 0.46m/s with SPC   Pt will be able to manage her walker up 1 step without instability to improve independence with access to her home. - 6/15: progressing   - 7/21: MET    Pt will improve time on 5xSTS to <15 seconds with use of 1 UE to decrease fall risk and increase functional capacity for mobility. - 6/15: completes with unilateral UE support this date but time is increased. - 7/21: progressing -- reaches 15.09s with 1 UE support   LTG: (to be met by end of POC )  Pt will improve score on ESCOBEDO balance scale by >/= 45/56 points  to improve overall balance stability and decrease fall risk with mobility. - 6/15: progressing -- 33/56          - 7/21: progressing -- improves score ot 38/56   Pt will increase strength of all major muscle groups of the LEs to 4/5 or greater demonstrating improved strength needed to maximize safety and efficiency with mobility tasks such as gait, transfers and stairs.   - 6/15: progressing  -7/21; Progressing -- great progress made with LE strength, especially L ankle strength now all being above 3/5  Pt will be independent with home program addressing strength, flexibility and balance to maximize gains made in therapy to improve overall functional capacity for mobility.     -6/15: progressing -- will continue to update in POC  -7/21: progressing -- will continue to update in POC  Pt will improve score on OPTIMAL to 46/90 to demonstrate functional improvements with ADLs.   -6/15: will assess at a later time    - 7/21: will assess next session   Pt will report no falls for x6 weeks demonstrating improved safety with mobility and implementation of fall prevention strategies. - 6/15: meeting currently with no falls noted  - 7/21; MET -- no falls noted, verbalizes awareness of strategies to decrease falls   6. NEW GOAL 7/12: Pt will improve AROM in of cervical ROM to >60 bilaterally to improve ability to visually scan while driving.              -7/21; Progressing   7. NEW GOAL 7/12: Pt will be able to maintain at least a neutral cervical alignment for > 75% of session showing improved cervical strength for keeping her head up to visualize her environment.              -7/21: progressing   8. NEW GOAL 7/12: Pt will demonstrate improved postural awareness with only minimal cues to correct posture throughout session to prevent undue stress to spine.               -7/21; Progressing   * further goals may be established based on additional assessments or progress*         Patient stated Goals: to walk without a walker, improve balance     Pt. Education:  [x] Yes  [] No  [x] Reviewed Prior HEP/Ed  Method of Education: [x] Verbal  [x] Demo  [] Written  Comprehension of Education:  [x] Verbalizes understanding. [] Demonstrates understanding. [] Needs review. [] Demonstrates/verbalizes HEP/Ed previously given.      Plan: [x] Continue per POC    [] Other:          Treatment Charges: Mins Units   []  Modalities     [x]  Ther Exercise 42 3   []  Manual Therapy     []  Ther Activities []  Aquatics     []  Neuromuscular     [] Vasocompression     [] Gait Training     [] Dry needling        [] 1 or 2 muscles        [] 3 or more muscles     []  Other     Total Treatment time 42 3   * KX modifier needed*     Time In: 2191 am       Time Out:  1215 pm      Electronically signed by:  Radha Hopper PT

## 2022-07-29 NOTE — PROGRESS NOTES
[address]         Phone: [phone]       Fax: [phone]     Occupational Therapy Daily Treatment note     Occupational Therapy: Daily Note   Patient: Perla Tay (85 y.o. female)   Examination Date: 2022  No data recorded  Progress Note Counter: MD appt 22     :  1960 # of Visits since [facility]:   25   MRN: [id number]  CSN: [id number] Start of Care Date:    Insurance: Payor: MEDICARE / Plan: MEDICARE PART A AND B / Product Type: *No Product type* /   Insurance ID: [id number] - (Medicare) Secondary Insurance (if applicable): Insurance Information: Medicare   Referring Physician: MD Dr Nadine Toussaint   PCP: Stefany Dial MD Visits to Date/Visits Approved:     No Show/Cancelled Appts:   /       Medical Diagnosis: Unspecified injury at unspecified level of cervical spinal cord, sequela [S14.109S]  Quadriplegia, C5-C7 incomplete [G82.54]  Cauda equina syndrome [G83.4] spinal cord injury , cervical region sequela (S14.109S), incomplete quadriplegia at C5-6 level (G82.54), cauda equina syndrome ( G83.4) - ICD-10 codes  Treatment Diagnosis: bilateral UE weakness & impaired coordination, bilateral shoulder stiffness, impaired sensation - hands lt more than rt  (M62.81, R27.9, M25.611, M25.612,R20.2        SUBJECTIVE EXAMINATION   Pain Level: Pain Screening  Patient Currently in Pain: Yes  Pain Assessment: 0-10  Pain Level: 5  Post Treatment Pain Level: 5  Patient's Stated Pain Goal: 0 - No pain  Pain Type: Acute pain, Chronic pain  Pain Location: Neck, Arm, Leg  Pain Orientation: Right, Left  Pain Descriptors: Tightness, Tingling (tingling fingers.)    Patient Comments: Subjective: Pt states her pain is the same a \"5\" for neck,arms,legs. Pt states having chronic pain is no fun. Pt states that her 5 y/o grand daughter will be over today.     HEP Compliance: Good        OBJECTIVE EXAMINATION   Restrictions: Restrictions/Precautions: Fall Risk; General Precautions        TREATMENT     Exercises:     Treatment Reasoning    OT Exercise 1: PROM B UE,shoulders, wrist/hand, finger blocking exercises bilateral hands( PIP flex/extension 25x, DIP flex/[id number] ), stretch of left hand/wrist  OT Exercise 8: velcro board, remove 32 pieces  using left hand  and place to right then place back on board, remove 32 pieces using  right hand  and place to left then place back on board  OT Exercise 10: BTE #504 B wrists for flexion/extension, T = 7  lbs. , 120 seconds (rt hand, lt hand)  OT Exercise 11: BTE #162 hand gripper, T = 30in lbs, 60 seconds(rt hand/lt hand);  pinch , T = 25 in lbs, 60 seconds. (rt hand/lt hand)  OT Exercise 17: key pegs : using lt hand place all 25 pegs in board, them remove pegs & hold in lt hand. OT Exercise 18: BTE tool 122 ergonometer T =4 lb (75 seconds forward, 75 seconds backward)  OT Exercise 19: BTE tool 802  bilateral UE push /pull T = 5 lb for 120 seconds  OT Exercise 20: BTE tool 302 flat knob (RD/UD) to simulate open jar/bottle : T = 5 inlb  (rt hand 60 seconds, lt hand  60 seconds)    Limitations addressed: Strength, Coordination, Flexibility  Therapist provided: Verbal cuing  Progressed: Resistance  Progressed: Increase torque ,  pinch & , increase time on BTE tool 802                      ASSESSMENT     Assessment: Assessment: Pt participates in therapeutic exercises for bilateral UE & hand strengthening & coordination. PROM to decrease shoulder tightness. Set up for BTE UE strengthening exercises. Pt takes rest break  when hands get tired. Pt completes BTE gripping,pinching & ergonometer with no increase pain , completes lt hand gripping & pinching slower with lt hand. Pt only drops 2key  pegs when removing using lt hand.   See OT exercises for detail  Performance deficits / Impairments: Decreased ROM, Decreased strength, Decreased coordination, Decreased fine motor control, Decreased high-level IADLs, Decreased sensation    Post-Treatment Pain Level: 5    Prognosis: Good    Activity Tolerance: Patient tolerated treatment well               TREATMENT PLAN   REQUIRES OT FOLLOW-UP: Yes  Type: Outpatient  Plan  Plan Frequency: 2x/week  Plan Weeks: 20 visits+ 10 more visits after 07/027/2022  Current Treatment Recommendations: Strengthening, ROM, Patient/Caregiver education & training, Coordination training, Positioning  Plan Comment: continue OT       Therapy Time  Individual Time In: 6968  Individual Time Out: 0380  Minutes: 42  Timed Code Treatment Minutes: 42 Minutes       Electronically signed by Jonathan Martin OT  on 7/29/2022 at 12:34 PM       Treatment Charges: Mins Units Time In/Out   [] Evaluation       []  Low       []  Moderate       []  High      []  Electrical Stim      []  Iontophoresis      []  Paraffin      []  Ultrasound        []  Masage      []  Neuromuscular Re-ed      []  ADL      [x]  Ther Exercise 42 3    []  Ther Activities      []  Neuro Re-Ed      []  Splinting      []  Other      Total Treatment time 42 min 3          POC NOTE

## 2022-08-03 ENCOUNTER — HOSPITAL ENCOUNTER (OUTPATIENT)
Dept: PHYSICAL THERAPY | Age: 62
Setting detail: THERAPIES SERIES
Discharge: HOME | End: 2022-08-03
Payer: MEDICARE

## 2022-08-03 ENCOUNTER — CARE COORDINATION (OUTPATIENT)
Dept: CARE COORDINATION | Age: 62
End: 2022-08-03

## 2022-08-03 PROCEDURE — 97116 GAIT TRAINING THERAPY: CPT

## 2022-08-03 PROCEDURE — 97110 THERAPEUTIC EXERCISES: CPT

## 2022-08-03 NOTE — FLOWSHEET NOTE
[address] [facility] Outpatient Physical Therapy   [facility]   Phone: [phone]   Fax: [phone]    Physical Therapy Daily Treatment Note      Date:  8/3/2022  Patient Name:  Delicia Munoz    :  1960  MRN: [id number]  Physician: Yael Boyer MD                            Insurance: Medicare - based on [place] -- [facility] Diagnosis:   F90.574I (ICD-10-CM) - Spinal cord injury, cervical region, sequela ([address]   G82.54 (ICD-10-CM) - Incomplete quadriplegia at C5-6 level ([address]   G83.4 (ICD-10-CM) - Cauda equina syndrome ([address]   Rehab Codes: R25 pain , R53.1 weakness, Z74.0 reduced mobility, R29.6 high fall risk, R27.8 lack of coordination, R26.89 gait abnormality   Date of symptom onset: 21 -- cauda equina; 22 Cervical surgeries   Next 's appt.: 10/12/22 with Neurosurgery; 22 with PMR Dr. Lila Harris   Total Visits:  (Corrected this date) Cx/Ns:0/0   Date of initial visit: 22        Date of PN: 22 (visit 20); Precautions: can now wean out of cervical collar; not lifting greater than a gallon of milk: Left AFO      Subjective:  Patient arrives with 2WW and without cervical collar. She is wearing her ankle brace. She notes she was having a headache all day but it is now going away. Denies any falls at home.      Pain:  [x] Yes  [] No Location: Neck down to (B) Shoulders/UEs to knuckles in hands (B) LE's int feet and toes    Pain Rating: (0-10 scale) 6/10  Pain altered Tx:  [x] No  [] Yes  Action:  Comments:       Objective:   Exercises:  Exercise Reps/ Time Weight/ Level Completed  Today Comments   nustep  5' Level 4  B UE/LE           Cervical stretching        Upper trap stretch  2x30s ea   x Therapist over pressure    Levator scap stretch  2x30s ea   x Therapist overpressure    Rotation stretch  2x30s ea   x Therapist overpressure    Doorway pec stretch  2x30s             Postural strengthening     Half roll behind in Ambulation with SBQC  x75ft   SBA -- cues for heel contact to get larger step lengths    Ambulation with SPC  X90ft   x CGA     Weaving through cones  X6 passes   x CGA -- SBA    Walking with LLE taps to cones with SPC X4 passes  5x cones  x CGA -- has x1 LOB needing modA to recover d/t stepping her L foot on her R foot    Walking with RLE taps to cones with SPC  X4 passes  5x cones x CGA -- has a few stumbles but able to self recover   Straight leg kicks with SBQC 2x30ft    CGA    Backwards walking with SPC  2x2 laps of ~30ft   x CGA to SBA -- multiple balance checks    Walking with horizontal head turns  2x20ft    Moderate instability    Walking with vertical head turns  2x20ft    Moderate instability    Marching with SBQC  2x20ft    CGA   Curb steps with SBQC  x8 4\"  CGA to [place] -- trialed with ea leg leading ascending and descending    ambulation with quad cane and ankle wt  [id number]# aw   CGA-- focusing on larger step lengths           6mWT    460ft 7/27/22   Other:   7/29: 5' spent  on educating on the brace and how to don. Then ambulated x90ft with SPC to ensure straps donned appropriately. Specific Instructions for next treatment: Work on ambulation with SPC-- Complete optimal that is in soft chart -- Add in postural strengthening and L ankle strengthening; consider adding KT tape to L ankle to reduce foot drop. Work on balance and strength with eyes closed to improve proprioception      Assessment:    [x] Progressing toward goals. Spent beginning of sessions working on cervical mobility and posture to improve alignment and tightness that could be contributing to headaches. Pt has improved mobility post stretching. Added a half foam roll behing her in sitting to promote better postural alignment. Then spent time working on ambulation tasks with [employer] to improve her balance, control, and endurance.  Had her complete toe taps to cone while walking as this facilitates neuromuscular control and balance she would need for home and community mobility. She has some stumbles but able to correct herself with CGA. Does has 1 major LOB where she stepped  on her foot that required modA to correct. Feel she was also distracted which caused her to have the instability. Still needing to work on divided attention and dual tasking that will make her safer with mobility outside of a controlled environment. Ended session with resisted side stepping for strengthening of the proximal muscles, Much better with foot clearance and coordination of the stepping. By end of session pt is appropriately fatigued. [] No change. [] Other:    [x] Patient would continue to benefit from skilled physical therapy services in order to: improve strength,mobiltiy and motor control that impacts balance and mobility. GOALS -- assessed 6/15/22 & 7/21/22  STG: (to be met in 10 treatments)   Pt will improve score on ESCOBEDO balance scale by >/=6 points to improve overall balance stability and decrease fall risk with mobility. - 6/15: MET -- exceeds  Pt will demonstrate >10 SLS stability with light UE support to safely allow pt to enter/exit her tub shower. - 6/15: partially met -- light UE support for RLE, still difficulty on the L        - 7/21: progressing -- minUE support for stability on LLE   Pt will improve time on TUG to <20s with LRAD at mod IND level to demonstrate decreased fall risk. - 6/15: progressing -- 10 second improvement to 25.5s         - 7/21: MET with 2WW reaching 15.7s; will continue goal to see if she can achieve with cane   Pt will increase gait speed to >0.8 m/s  With LRAD at mod I level to demonstrate decreased fall risk and optimize mobility to at community level.         - 6/15: limited increase in speed but pt is now mod IND with 2WW        - 7/21: progressing-- significant improvement in gait speed with 2WW to 0.67m/s & 0.46m/s with SPC   Pt will be able to manage her walker up 1 step without instability to improve independence with access to her home. - 6/15: progressing   - 7/21: MET    Pt will improve time on 5xSTS to <15 seconds with use of 1 UE to decrease fall risk and increase functional capacity for mobility. - 6/15: completes with unilateral UE support this date but time is increased. - 7/21: progressing -- reaches 15.09s with 1 UE support   LTG: (to be met by end of POC )  Pt will improve score on ESCOBEDO balance scale by >/= 45/56 points  to improve overall balance stability and decrease fall risk with mobility. - 6/15: progressing -- 33/56          - 7/21: progressing -- improves score ot 38/56   Pt will increase strength of all major muscle groups of the LEs to 4/5 or greater demonstrating improved strength needed to maximize safety and efficiency with mobility tasks such as gait, transfers and stairs. - 6/15: progressing  -7/21; Progressing -- great progress made with LE strength, especially L ankle strength now all being above 3/5  Pt will be independent with home program addressing strength, flexibility and balance to maximize gains made in therapy to improve overall functional capacity for mobility.     -6/15: progressing -- will continue to update in POC  -7/21: progressing -- will continue to update in POC  Pt will improve score on OPTIMAL to 46/90 to demonstrate functional improvements with ADLs.   -6/15: will assess at a later time    - 7/21: will assess next session   Pt will report no falls for x6 weeks demonstrating improved safety with mobility and implementation of fall prevention strategies. - 6/15: meeting currently with no falls noted  - 7/21; MET -- no falls noted, verbalizes awareness of strategies to decrease falls   6. NEW GOAL 7/12: Pt will improve AROM in of cervical ROM to >60 bilaterally to improve ability to visually scan while driving.              -7/21; Progressing   7. NEW GOAL 7/12:  Pt will be able to maintain at least a neutral cervical alignment for > 75% of session showing improved cervical strength for keeping her head up to visualize her environment.              -7/21: progressing   8. NEW GOAL 7/12: Pt will demonstrate improved postural awareness with only minimal cues to correct posture throughout session to prevent undue stress to spine.               -7/21; Progressing   * further goals may be established based on additional assessments or progress*         Patient stated Goals: to walk without a walker, improve balance     Pt. Education:  [x] Yes  [] No  [x] Reviewed Prior HEP/Ed  Method of Education: [x] Verbal  [x] Demo  [] Written  Comprehension of Education:  [x] Verbalizes understanding. [] Demonstrates understanding. [] Needs review. [] Demonstrates/verbalizes HEP/Ed previously given.      Plan: [x] Continue per POC    [] Other:          Treatment Charges: Mins Units   []  Modalities     [x]  Ther Exercise 29 2   []  Manual Therapy     []  Ther Activities     []  Aquatics     []  Neuromuscular     [] Vasocompression     [x] Gait Training 25 2   [] Dry needling        [] 1 or 2 muscles        [] 3 or more muscles     []  Other     Total Treatment time 54 4   * KX modifier needed*     Time In: 3;34 p       Time Out:  4:28p      Electronically signed by:  Ganesh Juarez, PT

## 2022-08-05 ENCOUNTER — HOSPITAL ENCOUNTER (OUTPATIENT)
Dept: PHYSICAL THERAPY | Age: 62
Setting detail: THERAPIES SERIES
Discharge: HOME | End: 2022-08-05
Payer: MEDICARE

## 2022-08-05 ENCOUNTER — HOSPITAL ENCOUNTER (OUTPATIENT)
Dept: OCCUPATIONAL THERAPY | Age: 62
Setting detail: THERAPIES SERIES
Discharge: HOME | End: 2022-08-05
Payer: MEDICARE

## 2022-08-05 PROCEDURE — 97110 THERAPEUTIC EXERCISES: CPT

## 2022-08-05 PROCEDURE — 97116 GAIT TRAINING THERAPY: CPT

## 2022-08-05 ASSESSMENT — PAIN DESCRIPTION - ORIENTATION: ORIENTATION: LEFT

## 2022-08-05 ASSESSMENT — PAIN DESCRIPTION - DESCRIPTORS: DESCRIPTORS: TIGHTNESS;TINGLING

## 2022-08-05 ASSESSMENT — PAIN DESCRIPTION - FREQUENCY: FREQUENCY: CONTINUOUS

## 2022-08-05 ASSESSMENT — PAIN DESCRIPTION - LOCATION: LOCATION: ARM;LEG

## 2022-08-05 ASSESSMENT — PAIN SCALES - GENERAL: PAINLEVEL_OUTOF10: 6

## 2022-08-05 NOTE — PROGRESS NOTES
[address]         Phone: [phone]       Fax: [phone]     Occupational Therapy Daily Treatment note     Occupational Therapy: Daily Note   Patient: Johana Harris (89 y.o. female)   Examination Date: 2022  No data recorded  Progress Note Counter: MD appt 817-22     :  1960 # of Visits since [facility]:   23   MRN: [id number]  CSN: [id number] Start of Care Date: 05/10/2022   Insurance: Payor: MEDICARE / Plan: MEDICARE PART A AND B / Product Type: *No Product type* /   Insurance ID: [id number] - (Medicare) Secondary Insurance (if applicable): Insurance Information: Medicare   Referring Physician: MD Dr Shakir Perkins   PCP: Loy Basilio MD Visits to Date/Visits Approved:     No Show/Cancelled Appts:   /       Medical Diagnosis: Unspecified injury at unspecified level of cervical spinal cord, sequela [S14.109S]  Quadriplegia, C5-C7 incomplete [G82.54]  Cauda equina syndrome [G83.4] spinal cord injury , cervical region sequela (S14.109S), incomplete quadriplegia at C5-6 level (G82.54), cauda equina syndrome ( G83.4) - ICD-10 codes  Treatment Diagnosis: bilateral UE weakness & impaired coordination, bilateral shoulder stiffness, impaired sensation - hands lt more than rt  (M62.81, R27.9, M25.611, M25.612,R20.2        SUBJECTIVE EXAMINATION   Pain Level: Pain Screening  Patient Currently in Pain: Yes  Pain Assessment: 0-10  Pain Level: 6  Post Treatment Pain Level: 6  Patient's Stated Pain Goal: 0 - No pain  Pain Location: Arm, Leg  Pain Orientation: Left  Pain Descriptors: Tightness, Tingling  Pain Frequency: Continuous    Patient Comments: Subjective: Pt states she is a little more achy.     HEP Compliance: Good        OBJECTIVE EXAMINATION   Restrictions: Restrictions/Precautions: Fall Risk; General Precautions       TREATMENT     Exercises:     Treatment Reasoning    OT Exercise 1: PROM B UE,shoulders, wrist/hand, finger blocking exercises bilateral hands( PIP flex/extension 25x, DIP [id number] ), stretch of left hand/wrist  OT Exercise 9: guillermo tweezer dexterity - using lt hand pt places 11 thin metal pieces in board & removes 11 pieces using tweezer. OT Exercise 10: BTE #504 B wrists for flexion/extension, T = 7  lbs. , 120 seconds (rt hand, lt hand)  OT Exercise 11: BTE #162 hand gripper, T = 30in lbs, 70 seconds(rt hand/lt hand);  pinch , T = 25 in lbs, 70 seconds. (rt hand/lt hand)  OT Exercise 17: key pegs : using lt hand place all 25 pegs in board, them remove pegs & hold in lt hand. OT Exercise 18: BTE tool 122 ergonometer T =5 lb (75 seconds forward, 75 seconds backward)  OT Exercise 19: BTE tool 802  bilateral UE push /pull T = 8 lb for 120 seconds  OT Exercise 20: BTE tool 302 flat knob (RD/UD) to simulate open jar/bottle : T = 5 inlb  (rt hand 70 seconds, lt hand  70 seconds)    Limitations addressed: Strength, Coordination, Flexibility (ROM)  Therapist provided: Verbal cuing  Progressed: Resistance  Progressed: Increase torque BTE tool 802, tool 302. Increase time BTE tool 162  & pinch. Begun guillermo tweezer dexterity exercise using lt hand. ASSESSMENT     Assessment: Assessment: Pt completes bilateral UE shoulder/hand strengthening exercises using BTE tools with set up & progression of resistances to improve UE strength & endurance. OT completed PROM bilateral UE- minimal tightness felt in shoulders. During fine motor exercise pt dropped only 1 key peg. OT progressed pt to guillermo dexterity exercise placing thin metal pins usinge tweezer with her lt hand with pt needing cues,demo, & increase time to complete task. . See OT exercises for detail.   Performance deficits / Impairments: Decreased ROM, Decreased strength, Decreased coordination, Decreased fine motor control, Decreased high-level IADLs, Decreased sensation    Post-Treatment Pain Level: 6    Prognosis: Good    Activity Tolerance: Patient tolerated treatment well                    TREATMENT PLAN   REQUIRES OT FOLLOW-UP: Yes  Type: Outpatient  Plan  Plan Frequency: 2x/week  Plan Weeks: 20 visits+ 10 more visits after 07/027/2022  Current Treatment Recommendations: Strengthening, ROM, Patient/Caregiver education & training, Coordination training, Positioning  Plan Comment: continue OT       Therapy Time  Individual Time In: 3006  Individual Time Out: 0928  Minutes: 43  Timed Code Treatment Minutes: 43 Minutes       Electronically signed by Carmen Martinez OT  on 8/5/2022 at 4:54 PM       Treatment Charges: Mins Units Time In/Out   [] Evaluation       []  Low       []  Moderate       []  High      []  Electrical Stim      []  Iontophoresis      []  Paraffin      []  Ultrasound        []  Masage      []  Neuromuscular Re-ed      []  ADL      [x]  Ther Exercise 43 3    []  Ther Activities      []  Neuro Re-Ed      []  Splinting      []  Other      Total Treatment time 43 min 3          POC NOTE

## 2022-08-05 NOTE — FLOWSHEET NOTE
[address] [facility] Outpatient Physical Therapy   [address]   Phone: [phone]   Fax: [phone]    Physical Therapy Daily Treatment Note      Date:  2022  Patient Name:  Mildred Doe    :  1960  MRN: [id number]  Physician: Anya Mejias MD                            Insurance: Medicare - based on [place] -- [facility] Diagnosis:   Q99.322U (ICD-10-CM) - Spinal cord injury, cervical region, sequela ([facility]   G82.54 (ICD-10-CM) - Incomplete quadriplegia at C5-6 level ([facility]   G83.4 (ICD-10-CM) - Cauda equina syndrome ([facility]   Rehab Codes: R25 pain , R53.1 weakness, Z74.0 reduced mobility, R29.6 high fall risk, R27.8 lack of coordination, R26.89 gait abnormality   Date of symptom onset: 21 -- cauda equina; 22 Cervical surgeries   Next 's appt.: 10/12/22 with Neurosurgery; 22 with PMR Dr. Freddy Epps   Total Visits:   Cx/Ns:0/0   Date of initial visit: 22        Date of PN: 22 (visit 20); Precautions: can now wean out of cervical collar; not lifting greater than a gallon of milk: Left AFO      Subjective:  Patient states she is feeling very stiff today. Patient denies any changes or increase in pain since last session. Pain:  [x] Yes  [] No Location: Neck down to (B) Shoulders/UEs to knuckles in hands (B) LE's int feet and toes    Pain Rating: (0-10 scale) 6/10  Pain altered Tx:  [x] No  [] Yes  Action:  Comments:       Objective:   Exercises:  Exercise Reps/ Time Weight/ Level Completed  Today Comments   nustep  5' Level 4 x B UE/LE - at end of session           Cervical stretching        Upper trap stretch  2x30s ea   x Therapist over pressure    Levator scap stretch  2x30s ea   x Therapist overpressure    Rotation stretch  2x30s ea    Therapist overpressure    Doorway pec stretch  2x30s             Postural strengthening     Half roll behind in sitting for posture promotion.     Seated rows  2x15 green  x    Seated horizontal ABd  2x15 green x    Seated B ER  2x15  green x With scap squeeze at end   Standing resisted rotations  2x10 ea  green  Good challenge for balance           MAT LEVEL     Elevated with large wedge and 2 pillows    SKTC  2x30s ea   L ONLY    HS stretch  2x30s ea   L ONLY    Figure 4 stretch  2x30s ea   L ONLY    glute bridges with band  x15 EO   x15 EC  Red   Felt increased challenge with eyes closed due to proprioceptive deficits    Isolated hooklying clams   2x15 ea  Green    Isolated makes non moving limb stabilize    4 way ankle 2x15e  yellow . Standing       Resisted STS    2x10   Green   From mat table    Sit to stand without hands  2x10  x CGA to Miryam.  Cues for momentum and foot placement     Step ups Fwd/Lat 10x2 6\", 2#  unilateral UE support    3 way hip w/ eyes closed  10x ea  2 sets 3# aw  With BUE support, eyes closed to build proprioceptive input     Slow Controlled Marches 2-3\" to raise and lower 10x  2 sets  No UE support   CGA    Forced WB on 4\" step + 1/2 ball 2x30\" each   Encouraged light touch; WB L LE more difficult than R LE   Slow high march w/ opposite UE raise 10x2 ea 3# L LE     Heel/toe raises  10x2      Semi tandem Stance  3x30\"   NO UE Support, CGA    EC Foam feet Normal Stance 2x30\"    NO UE Support   EO/ EC Foam Feet touching 3x30''   NO UE support  Alternated EO/EC   Standing on foam with press forward & press up  2x10 4# ball      Feet together Eye Closed Firm surface 2x30\"    NO UE Support- cues at hips as weight is shifted over LLE     Toe taps 6\" step + 1/2 ball 10x2 ea 2#  Unilateral UE support    Basil step overs w/ dual tasking  2x15 6 in basil  UE support with quad cane and pt naming nominal categories for dual tasking challenge    Resisted side stepping  3x15 ea yellow  HHA with therapist                         GAIT TRAINING        Ambulation with SBQC  x75ft   SBA -- cues for heel contact to get larger step lengths    Ambulation with SPC  120ft  x CGA Weaving through cones  X6 passes    CGA -- SBA    Walking with LLE taps to cones with SPC X4 passes  5x cones   CGA -- has x1 LOB needing modA to recover d/t stepping her L foot on her R foot    Walking with RLE taps to cones with SPC  X4 passes  5x cones  CGA -- has a few stumbles but able to self recover   Straight leg kicks with SBQC 2x30ft    CGA    Backwards walking with SPC  2x25ft  x CGA    Walking with horizontal head turns  2x20ft    Moderate instability    Walking with vertical head turns  2x20ft    Moderate instability    Marching with SPC 4x25ft  x CGA   Walking uneven surfaces with SPC 3x20ft  x 2 cobble foam and long foam put together; CGA. 1xminA for LOB on cobble foam   Curb steps with SBQC  x8 4\"  CGA to Miryam -- trialed with ea leg leading ascending and descending    ambulation with quad cane and ankle wt  [id number] 3# aw   CGA-- focusing on larger step lengths           6mWT    460ft 7/27/22   Other:   7/29: 5' spent  on educating on the brace and how to don. Then ambulated x90ft with SPC to ensure straps donned appropriately. Specific Instructions for next treatment: Work on ambulation with SPC-- Complete optimal that is in soft chart -- Add in postural strengthening and L ankle strengthening; consider adding KT tape to L ankle to reduce foot drop. Work on balance and strength with eyes closed to improve proprioception      Assessment:    [x] Progressing toward goals. Began session with postural strengthening and stretching due to forward flexed appearance and downward gaze with ambulation. Patient required many verbal and visual cues in order to correctly execute interventions. Added uneven surfaces with gait training with SPC today to challenge balance and coordination with SPC. Patient did well with motor planning and keeping movements slow and controlled. One minor LOB on cobble foam but patient was able to self correct. L LE appeared very stiff and shaky at end of session.  Ended session with nustep in order to continue BLE strengthening and improve joint mobility. [] No change. [] Other:    [x] Patient would continue to benefit from skilled physical therapy services in order to: improve strength,mobiltiy and motor control that impacts balance and mobility. GOALS -- assessed 6/15/22 & 7/21/22  STG: (to be met in 10 treatments)   Pt will improve score on ESCOBEDO balance scale by >/=6 points to improve overall balance stability and decrease fall risk with mobility. - 6/15: MET -- exceeds  Pt will demonstrate >10 SLS stability with light UE support to safely allow pt to enter/exit her tub shower. - 6/15: partially met -- light UE support for RLE, still difficulty on the L        - 7/21: progressing -- minUE support for stability on LLE   Pt will improve time on TUG to <20s with LRAD at mod IND level to demonstrate decreased fall risk. - 6/15: progressing -- 10 second improvement to 25.5s         - 7/21: MET with 2WW reaching 15.7s; will continue goal to see if she can achieve with cane   Pt will increase gait speed to >0.8 m/s  With LRAD at mod I level to demonstrate decreased fall risk and optimize mobility to at community level. - 6/15: limited increase in speed but pt is now mod IND with 2WW        - 7/21: progressing-- significant improvement in gait speed with 2WW to 0.67m/s & 0.46m/s with SPC   Pt will be able to manage her walker up 1 step without instability to improve independence with access to her home. - 6/15: progressing   - 7/21: MET    Pt will improve time on 5xSTS to <15 seconds with use of 1 UE to decrease fall risk and increase functional capacity for mobility. - 6/15: completes with unilateral UE support this date but time is increased.    - 7/21: progressing -- reaches 15.09s with 1 UE support   LTG: (to be met by end of POC )  Pt will improve score on ESCOBEDO balance scale by >/= 45/56 points  to improve overall balance stability and decrease fall risk with mobility. - 6/15: progressing -- 33/56          - 7/21: progressing -- improves score ot 38/56   Pt will increase strength of all major muscle groups of the LEs to 4/5 or greater demonstrating improved strength needed to maximize safety and efficiency with mobility tasks such as gait, transfers and stairs. - 6/15: progressing  -7/21; Progressing -- great progress made with LE strength, especially L ankle strength now all being above 3/5  Pt will be independent with home program addressing strength, flexibility and balance to maximize gains made in therapy to improve overall functional capacity for mobility.     -6/15: progressing -- will continue to update in POC  -7/21: progressing -- will continue to update in POC  Pt will improve score on OPTIMAL to 46/90 to demonstrate functional improvements with ADLs.   -6/15: will assess at a later time    - 7/21: will assess next session   Pt will report no falls for x6 weeks demonstrating improved safety with mobility and implementation of fall prevention strategies. - 6/15: meeting currently with no falls noted  - 7/21; MET -- no falls noted, verbalizes awareness of strategies to decrease falls   6. NEW GOAL 7/12: Pt will improve AROM in of cervical ROM to >60 bilaterally to improve ability to visually scan while driving.              -7/21; Progressing   7. NEW GOAL 7/12: Pt will be able to maintain at least a neutral cervical alignment for > 75% of session showing improved cervical strength for keeping her head up to visualize her environment.              -7/21: progressing   8. NEW GOAL 7/12: Pt will demonstrate improved postural awareness with only minimal cues to correct posture throughout session to prevent undue stress to spine.               -7/21; Progressing   * further goals may be established based on additional assessments or progress*         Patient stated Goals: to walk without a walker, improve balance     Pt.  Education:  [x] Yes [] No  [x] Reviewed Prior HEP/Ed  Method of Education: [x] Verbal  [x] Demo  [] Written  Comprehension of Education:  [x] Verbalizes understanding. [] Demonstrates understanding. [] Needs review. [] Demonstrates/verbalizes HEP/Ed previously given.      Plan: [x] Continue per POC    [] Other:          Treatment Charges: Mins Units   []  Modalities     [x]  Ther Exercise 21 1   []  Manual Therapy     []  Ther Activities     []  Aquatics     []  Neuromuscular     [] Vasocompression     [x] Gait Training 25 2   [] Dry needling        [] 1 or 2 muscles        [] 3 or more muscles     []  Other     Total Treatment time 46 4   * KX modifier needed*     Time In: 12:59 p       Time Out:  1:45p      Electronically signed by:  Phan Santa PTA

## 2022-08-09 ENCOUNTER — HOSPITAL ENCOUNTER (OUTPATIENT)
Dept: OCCUPATIONAL THERAPY | Age: 62
Setting detail: THERAPIES SERIES
Discharge: HOME | End: 2022-08-09
Payer: MEDICARE

## 2022-08-09 ENCOUNTER — HOSPITAL ENCOUNTER (OUTPATIENT)
Dept: PHYSICAL THERAPY | Age: 62
Setting detail: THERAPIES SERIES
Discharge: HOME | End: 2022-08-09
Payer: MEDICARE

## 2022-08-09 PROCEDURE — 97110 THERAPEUTIC EXERCISES: CPT

## 2022-08-09 ASSESSMENT — PAIN SCALES - GENERAL: PAINLEVEL_OUTOF10: 5

## 2022-08-09 ASSESSMENT — PAIN DESCRIPTION - FREQUENCY: FREQUENCY: CONTINUOUS

## 2022-08-09 ASSESSMENT — PAIN DESCRIPTION - ORIENTATION: ORIENTATION: LEFT

## 2022-08-09 ASSESSMENT — PAIN DESCRIPTION - LOCATION: LOCATION: ARM;LEG

## 2022-08-09 ASSESSMENT — PAIN DESCRIPTION - PAIN TYPE: TYPE: ACUTE PAIN

## 2022-08-09 ASSESSMENT — PAIN DESCRIPTION - DESCRIPTORS: DESCRIPTORS: TIGHTNESS;TINGLING

## 2022-08-09 NOTE — PROGRESS NOTES
[facility] [address] [place] [address]. [address]         Phone: [phone]       Fax: [phone]     Occupational Therapy Daily Treatment note     Occupational Therapy: Daily Note   Patient: Bentley Born (34 y.o. female)   Examination Date: 2022  No data recorded  Progress Note Counter: MD appt 22     :  1960 # of Visits since [facility]:   24   MRN: [id number]  CSN: [id number] Start of Care Date:    Insurance: Payor: MEDICARE / Plan: MEDICARE PART A AND B / Product Type: *No Product type* /   Insurance ID: [id number] - (Medicare) Secondary Insurance (if applicable): Insurance Information: Medicare   Referring Physician: MD Dr Issac Dorantes   PCP: Gage Rodríguez MD Visits to Date/Visits Approved:     No Show/Cancelled Appts:   /       Medical Diagnosis: Unspecified injury at unspecified level of cervical spinal cord, sequela [S14.109S]  Quadriplegia, C5-C7 incomplete [G82.54]  Cauda equina syndrome [G83.4] spinal cord injury , cervical region sequela (S14.109S), incomplete quadriplegia at C5-6 level (G82.54), cauda equina syndrome ( G83.4) - ICD-10 codes  Treatment Diagnosis: bilateral UE weakness & impaired coordination, bilateral shoulder stiffness, impaired sensation - hands lt more than rt  (M62.81, R27.9, M25.611, M25.612,R20.2        SUBJECTIVE EXAMINATION   Pain Level: Pain Screening  Patient Currently in Pain: Yes  Pain Assessment: 0-10  Pain Level: 5  Post Treatment Pain Level: 5  Patient's Stated Pain Goal: 0 - No pain  Pain Type: Acute pain  Pain Location: Arm, Leg  Pain Orientation: Left  Pain Descriptors: Tightness, Tingling (tingling more in lt hand than rt hand.)  Pain Frequency: Continuous    Patient Comments: Subjective: Pt states that her lt arm has been tight yesterday & today. Pt states over the weekend she watched tv programs with her grand daughter. . .     HEP Compliance: Good        OBJECTIVE EXAMINATION   Restrictions: Restrictions/Precautions: Fall Risk; General Precautions        TREATMENT     Exercises:     Treatment Reasoning    OT Exercise 1: PROM B UE,shoulders, wrist/hand  OT Exercise 5: valpar 4 nuts/bolts  - using rt hand unscrew/screw 4 ( 1\" diameter nuts) on/off bolts, using lt hand unscrew/screw 4 (1\" diameter nuts ) on/off bolts. OT Exercise 8: velcro board, remove 32 pieces  using left hand  and place to right then place back on board, remove 32 pieces using  right hand  and place to left then place back on board  OT Exercise 10: BTE #504 B wrists for flexion/extension, T = 8  lbs. , 120 seconds (rt hand, lt hand)  OT Exercise 11: BTE #162 hand gripper, T = 30in lbs, 75 seconds(rt hand/lt hand);  pinch , T = 25 in lbs, 85 seconds. (rt hand/lt hand)  OT Exercise 18: BTE tool 122 ergonometer T =5 lb (80 seconds forward, 80 seconds backward)  OT Exercise 19: BTE tool 802  bilateral UE push /pull T = 8 lb for 120 seconds  OT Exercise 20: BTE tool 302 flat knob (RD/UD) to simulate open jar/bottle : T = 5 inlb  (rt hand 75 seconds, lt hand  75 seconds)    Limitations addressed: Strength, Coordination, Flexibility (ROM)  Therapist provided: Verbal cuing  Progressed: Begun valpar 4 to improve hand manual/fine motor dexterity. Increase time on BTE tools 162, 122, 302                          ASSESSMENT     Assessment: Assessment: Increase tightness felt in lt shoulder muscles during PROM. Pt's pain decrease from last tx. Pt demo improving strength/endurance by completing BTE tools 162, 122, 302 for longer time period before getting tired. OT instructed pt in valpar 4 exercise - manipulation of nuts off/on bolts to improve bilateral hand manual/fine motor dexterity. Pt reports nuts/bolts exercise was a little difficult. See OT exericses for details of exercises working on goals for bilateral UE strengthening & coordination for daily activities.   Performance deficits / Impairments: Decreased ROM, Decreased strength, Decreased coordination, Decreased fine motor control, Decreased high-level IADLs, Decreased sensation    Post-Treatment Pain Level: 5    Prognosis: Good    Activity Tolerance: Patient tolerated treatment well               TREATMENT PLAN   REQUIRES OT FOLLOW-UP: Yes  Type: Outpatient  Plan  Plan Frequency: 2x/week  Plan Weeks: 20 visits+ 10 more visits after 07/027/2022  Current Treatment Recommendations: Strengthening, ROM, Patient/Caregiver education & training, Coordination training, Positioning  Plan Comment: continue OT        08/09/22 1333   OT Individual Minutes   Time In 4780   Time Out 1416   Minutes 39   OT Concurrent Minutes   Time In 0012   Time Out 3285   Minutes 4   Time Code Minutes    Timed Code Treatment Minutes 41 Minutes     Electronically signed by Champ Sanchez OT  on 8/9/2022 at 5:12 PM       Treatment Charges: Mins Units Time In/Out   [] Evaluation       []  Low       []  Moderate       []  High      []  Electrical Stim      []  Iontophoresis      []  Paraffin      []  Ultrasound        []  Masage      []  Neuromuscular Re-ed      []  ADL      [x]  Ther Exercise 41 3    []  Ther Activities      []  Neuro Re-Ed      []  Splinting      []  Other      Total  time 43 min           POC NOTE

## 2022-08-09 NOTE — FLOWSHEET NOTE
[facility]   [address]   Phone: [phone]   Fax: [phone]    Physical Therapy Daily Treatment Note      Date:  2022  Patient Name:  Elana Muro    :  1960  MRN: [id number]  Physician: Connor Aldrich MD                            Insurance: Medicare - based on [place] -- [facility] Diagnosis:   V48.484R (ICD-10-CM) - Spinal cord injury, cervical region, sequela ([facility] [address]   G82.54 (ICD-10-CM) - Incomplete quadriplegia at C5-6 level ([facility] [address]   G83.4 (ICD-10-CM) - Cauda equina syndrome ([facility] [address]   Rehab Codes: R25 pain , R53.1 weakness, Z74.0 reduced mobility, R29.6 high fall risk, R27.8 lack of coordination, R26.89 gait abnormality   Date of symptom onset: 21 -- cauda equina; 22 Cervical surgeries   Next 's appt.: 10/12/22 with Neurosurgery; 22 with PMR Dr. Devora Moy   Total Visits:   Cx/Ns:0/0   Date of initial visit: 22        Date of PN: 22 (visit 20); Precautions: Can now wean out of cervical collar; not lifting greater than a gallon of milk: Left AFO      Subjective:  Patient states she is still experiencing her normal 5/10 pain and its never really better or worse, its just a constant pain/soreness. Patient reports no changes since last session. Pain:  [x] Yes  [] No Location: Neck down to (B) Shoulders/UEs to knuckles in hands (B) LE's int feet and toes    Pain Rating: (0-10 scale) 5/10  Pain altered Tx:  [x] No  [] Yes  Action:  Comments:       Objective:   Exercises:  Exercise Reps/ Time Weight/ Level Completed  Today Comments   nustep  5' Level 5 x Incr.  Resistance 8/9          Cervical stretching        Upper trap stretch  2x30s ea    Therapist over pressure    Levator scap stretch  2x30s ea    Therapist overpressure    Rotation stretch  2x30s ea    Therapist overpressure    Doorway pec stretch  2x30s             Postural strengthening     Half roll behind in sitting for posture promotion. Seated rows  2x15 green      Seated horizontal ABd  2x15 green     Seated B ER  2x15  green  With scap squeeze at end   Standing resisted rotations  2x10 ea  green  Good challenge for balance           MAT LEVEL     Elevated with large wedge and 2 pillows    SKTC  2x30s ea   L ONLY    HS stretch  2x30s ea   L ONLY    Figure 4 stretch  2x30s ea   L ONLY    glute bridges with band  x15 EO   x15 EC  Red   Felt increased challenge with eyes closed due to proprioceptive deficits    Isolated hooklying clams   2x15 ea  Green    Isolated makes non moving limb stabilize    4 way ankle 2x15e  yellow x           Standing       Resisted STS    2x10   Green   From mat table    Sit to stand without hands  2x10   CGA to Miryam.  Cues for momentum and foot placement     Step ups Fwd/Lat 10x2 6\", 2#  unilateral UE support    3 way hip w/ eyes closed  10x ea  2 sets 3# aw  With BUE support, eyes closed to build proprioceptive input     Slow Controlled Marches 2-3\" to raise and lower 10x  2 sets  No UE support   CGA    Forced WB on 4\" step + 1/2 ball 2x30\" each   Encouraged light touch; WB L LE more difficult than R LE   Slow high march w/ opposite UE raise 10x2 ea 3# L LE     Heel/toe raises  20x  x SPC for support   Semi tandem Stance  3x30\"   NO UE Support, CGA    EC Foam feet Normal Stance 2x30\"   x NO UE Support CGA   EO/ EC Foam Feet touching 3x30''   NO UE support  Alternated EO/EC   Standing on foam with press forward & press up  2x10 4# ball      Feet together Eye Closed Firm surface 2x30\"    NO UE Support- cues at hips as weight is shifted over LLE     Toe taps 6\" step + 1/2 ball 10x2 ea 2#  Unilateral UE support    Basil step overs w/ dual tasking  2x15 6 in basil  UE support with quad cane and pt naming nominal categories for dual tasking challenge    Resisted side stepping  2x10 ea yellow x HHA with therapist    Step up to foam w/ SLS 10x3'' ea  x SPC for support                 GAIT TRAINING Ambulation with SBQC  x75ft   SBA -- cues for heel contact to get larger step lengths    Ambulation with SPC  120ft   CGA     Weaving through cones  X6 passes    CGA -- SBA    Walking with LLE taps to cones with SPC X4 passes  5x cones   CGA -- has x1 LOB needing modA to recover d/t stepping her L foot on her R foot    Walking with RLE taps to cones with SPC  X4 passes  5x cones  CGA -- has a few stumbles but able to self recover   Straight leg kicks with SBQC 2x30ft    CGA    Backwards walking with SPC  2x25ft   CGA    Walking with horizontal head turns  2x20ft    Moderate instability    Walking with vertical head turns  2x20ft    Moderate instability    Marching with SPC 4x25ft   CGA   Walking uneven surfaces with SPC 3x20ft   2 cobble foam and long foam put together; CGA. 1xminA for LOB on cobble foam   Curb steps with SBQC  x8 4\"  CGA to Miryam -- trialed with ea leg leading ascending and descending    ambulation with quad cane and ankle wt  [id number] 3# aw   CGA-- focusing on larger step lengths           6mWT    460ft 7/27/22   Other:   7/29: 5' spent  on educating on the brace and how to don. Then ambulated x90ft with SPC to ensure straps donned appropriately. Specific Instructions for next treatment: Work on ambulation with SPC-- Complete optimal that is in soft chart -- Add in postural strengthening and L ankle strengthening; consider adding KT tape to L ankle to reduce foot drop. Work on balance and strength with eyes closed to improve proprioception      Assessment:    [x] Progressing toward goals. Began session with nustep and increased resistance for BLE strengthening. Patient noted a burning sensation down both legs at end of 5'. Continued session with focus on ankle strengthening. Verbal and manual cues required to start with ankle in neutral position as patient would naturally begin with ankle inversion for all 4 way ankle exercises.  Continued with standing exercises that challenged ankle stability and balance. All standing exercises completed at mat table without UE support, or occasional support with SPC as noted above with the documented exercises. Patient appropriately fatigued at end of session. Gave yellow theraband to patient and instructed her to complete 4 way ankle exercises at home. Patient verbalized understanding. [] No change. [] Other:    [x] Patient would continue to benefit from skilled physical therapy services in order to: improve strength,mobiltiy and motor control that impacts balance and mobility. GOALS -- assessed 6/15/22 & 7/21/22  STG: (to be met in 10 treatments)   Pt will improve score on ESCOBEDO balance scale by >/=6 points to improve overall balance stability and decrease fall risk with mobility. - 6/15: MET -- exceeds  Pt will demonstrate >10 SLS stability with light UE support to safely allow pt to enter/exit her tub shower. - 6/15: partially met -- light UE support for RLE, still difficulty on the L        - 7/21: progressing -- minUE support for stability on LLE   Pt will improve time on TUG to <20s with LRAD at mod IND level to demonstrate decreased fall risk. - 6/15: progressing -- 10 second improvement to 25.5s         - 7/21: MET with 2WW reaching 15.7s; will continue goal to see if she can achieve with cane   Pt will increase gait speed to >0.8 m/s  With LRAD at mod I level to demonstrate decreased fall risk and optimize mobility to at community level. - 6/15: limited increase in speed but pt is now mod IND with 2WW        - 7/21: progressing-- significant improvement in gait speed with 2WW to 0.67m/s & 0.46m/s with SPC   Pt will be able to manage her walker up 1 step without instability to improve independence with access to her home. - 6/15: progressing   - 7/21: MET    Pt will improve time on 5xSTS to <15 seconds with use of 1 UE to decrease fall risk and increase functional capacity for mobility.   - 6/15: completes with unilateral UE support this date but time is increased. - 7/21: progressing -- reaches 15.09s with 1 UE support   LTG: (to be met by end of POC )  Pt will improve score on ESCOBEDO balance scale by >/= 45/56 points  to improve overall balance stability and decrease fall risk with mobility. - 6/15: progressing -- 33/56          - 7/21: progressing -- improves score ot 38/56   Pt will increase strength of all major muscle groups of the LEs to 4/5 or greater demonstrating improved strength needed to maximize safety and efficiency with mobility tasks such as gait, transfers and stairs. - 6/15: progressing  -7/21; Progressing -- great progress made with LE strength, especially L ankle strength now all being above 3/5  Pt will be independent with home program addressing strength, flexibility and balance to maximize gains made in therapy to improve overall functional capacity for mobility.     -6/15: progressing -- will continue to update in POC  -7/21: progressing -- will continue to update in POC  Pt will improve score on OPTIMAL to 46/90 to demonstrate functional improvements with ADLs.   -6/15: will assess at a later time    - 7/21: will assess next session   Pt will report no falls for x6 weeks demonstrating improved safety with mobility and implementation of fall prevention strategies. - 6/15: meeting currently with no falls noted  - 7/21; MET -- no falls noted, verbalizes awareness of strategies to decrease falls   6. NEW GOAL 7/12: Pt will improve AROM in of cervical ROM to >60 bilaterally to improve ability to visually scan while driving.              -7/21; Progressing   7. NEW GOAL 7/12: Pt will be able to maintain at least a neutral cervical alignment for > 75% of session showing improved cervical strength for keeping her head up to visualize her environment.              -7/21: progressing   8. NEW GOAL 7/12:  Pt will demonstrate improved postural awareness with only minimal cues to correct posture throughout session to prevent undue stress to spine.               -7/21; Progressing   * further goals may be established based on additional assessments or progress*         Patient stated Goals: to walk without a walker, improve balance     Pt. Education:  [x] Yes  [] No  [x] Reviewed Prior HEP/Ed  Method of Education: [x] Verbal  [x] Demo  [] Written  Comprehension of Education:  [x] Verbalizes understanding. [] Demonstrates understanding. [] Needs review. [] Demonstrates/verbalizes HEP/Ed previously given.      Plan: [x] Continue per POC    [] Other:          Treatment Charges: Mins Units   []  Modalities     [x]  Ther Exercise 45 3   []  Manual Therapy     []  Ther Activities     []  Aquatics     []  Neuromuscular     [] Vasocompression     [] Gait Training     [] Dry needling        [] 1 or 2 muscles        [] 3 or more muscles     []  Other     Total Treatment time 45 3   * KX modifier needed*     Time In: 12:45 p       Time Out:  1:30p      Electronically signed by:  Wes Suero PTA

## 2022-08-11 ENCOUNTER — HOSPITAL ENCOUNTER (OUTPATIENT)
Dept: OCCUPATIONAL THERAPY | Age: 62
Setting detail: THERAPIES SERIES
Discharge: HOME | End: 2022-08-11
Payer: MEDICARE

## 2022-08-11 ENCOUNTER — HOSPITAL ENCOUNTER (OUTPATIENT)
Dept: PHYSICAL THERAPY | Age: 62
Setting detail: THERAPIES SERIES
Discharge: HOME | End: 2022-08-11
Payer: MEDICARE

## 2022-08-11 PROCEDURE — 97110 THERAPEUTIC EXERCISES: CPT

## 2022-08-11 PROCEDURE — 97112 NEUROMUSCULAR REEDUCATION: CPT

## 2022-08-11 ASSESSMENT — PAIN DESCRIPTION - LOCATION: LOCATION: ARM;BACK;LEG;NECK

## 2022-08-11 ASSESSMENT — PAIN DESCRIPTION - FREQUENCY: FREQUENCY: CONTINUOUS

## 2022-08-11 ASSESSMENT — PAIN DESCRIPTION - DESCRIPTORS: DESCRIPTORS: TIGHTNESS;TINGLING

## 2022-08-11 ASSESSMENT — PAIN SCALES - GENERAL: PAINLEVEL_OUTOF10: 6

## 2022-08-11 ASSESSMENT — PAIN DESCRIPTION - PAIN TYPE: TYPE: ACUTE PAIN

## 2022-08-11 ASSESSMENT — PAIN DESCRIPTION - ORIENTATION: ORIENTATION: LEFT

## 2022-08-11 NOTE — PROGRESS NOTES
[address]         Phone: [phone]       Fax: [phone]     Occupational Therapy Daily Treatment note     Occupational Therapy: Daily Note   Patient: Benedicto Malave (10 y.o. female)   Examination Date: 2022  No data recorded  Progress Note Counter: MD appt 8-17-22     :  1960 # of Visits since [facility]:   25   MRN: [id number]  CSN: [id number] Start of Care Date: 05/10/2022   Insurance: Payor: MEDICARE / Plan: MEDICARE PART A AND B / Product Type: *No Product type* /   Insurance ID: [id number] - (Medicare) Secondary Insurance (if applicable): Insurance Information: Medicare   Referring Physician: MD Dr Tej Colon   PCP: Krissy Carter MD Visits to Date/Visits Approved:     No Show/Cancelled Appts:   /       Medical Diagnosis: Unspecified injury at unspecified level of cervical spinal cord, sequela [S14.109S]  Quadriplegia, C5-C7 incomplete [G82.54]  Cauda equina syndrome [G83.4] spinal cord injury , cervical region sequela (S14.109S), incomplete quadriplegia at C5-6 level (G82.54), cauda equina syndrome ( G83.4) - ICD-10 codes  Treatment Diagnosis: bilateral UE weakness & impaired coordination, bilateral shoulder stiffness, impaired sensation - hands lt more than rt  (M62.81, R27.9, M25.611, M25.612,R20.2        SUBJECTIVE EXAMINATION   Pain Level: Pain Screening  Patient Currently in Pain: Yes  Pain Assessment: 0-10  Pain Level: 6  Post Treatment Pain Level: 6  Patient's Stated Pain Goal: 0 - No pain  Pain Type: Acute pain  Pain Location: Arm, Back, Leg, Neck  Pain Orientation: Left  Pain Radiating Towards: Pain radiates down lt side.   Pain Descriptors: Tightness, Tingling (Pt reports her lt side is always tight, Tingling lt hand more than rt hand.)  Pain Frequency: Continuous    Patient Comments: Subjective: Pt reports pain back, necks, arms, legs   Pt reports feeling tired today & she almost fell asleep in her recliner before coming to therapy. Pt reports her arms are tired. HEP Compliance: Good        OBJECTIVE EXAMINATION   Restrictions: Restrictions/Precautions: Fall Risk; General Precautions     TREATMENT     Exercises:     Treatment Reasoning    OT Exercise 1: PROM B UE,shoulders, wrist/hand  OT Exercise 2: 3# theracane UE exercises 15x each way ( shoulder circles cw/ccw, shoulder horizontal abduct/adduction, scapula pro/retraction with elbow flex/extension)  OT Exercise 3: red 10# theraband flexbar 20x each way using bilateral hand: twist, make U, make upside down U, make C, make backward C, hold flexbar vertical at bottom turn cw 10x /ccw 10x each way (rt hand, lt hand),  OT Exercise 5: valpar 4 nuts/bolts  - using rt hand unscrew/screw 4 ( 1\" diameter nuts) on/off bolts, using lt hand unscrew/screw 4 (1\" diameter nuts ) on/off bolts. OT Exercise 9: guillermo tweezer dexterity - using tweezerin  lt hand pt places 11 thin metal pieces in board, & removes 11 pieces using tweezer. OT Exercise 15: yellow therapy ball bilateral UE exercises 20x each way  : roll ball on table (forward/back, side to side, circles cw & ccw) - pt stand , press into sides of ball 20x, press down on ball 20x  (pt standing)  OT Exercise 16: press down cone in putty: 30x each way for rt/lt hand  (holding small end cone, holding large end cone)    Limitations addressed: Strength, Coordination, Flexibility  Therapist provided: Verbal cuing  Progressed: Complexity of movement  Progressed: Progressed pt to placing thin metal pins using lt hand with tweezer. Progressed pt to 3# theracane for shoulder/UE strengthening. ASSESSMENT     Assessment: Assessment: Pt participates in bilateral UE strengthening & coordination therapeutic exercises. PROM bilateral shoulders/wrist/hands to decrease muscle tightness. Begun theracane exercises to improve  bilateral UE  shoulder strength. Pt reports her arms a little tired after theracane exercise. Pt takes increase time completing tweezer dexterity activity using lt hand. See OT exercises for details.   Performance deficits / Impairments: Decreased ROM, Decreased strength, Decreased coordination, Decreased fine motor control, Decreased high-level IADLs, Decreased sensation    Post-Treatment Pain Level: 6    Prognosis: Good    Activity Tolerance: Patient tolerated treatment well               TREATMENT PLAN   REQUIRES OT FOLLOW-UP: Yes  Type: Outpatient  Plan  Plan Frequency: 2x/week  Plan Weeks: 20 visits+ 10 more visits after 07/027/2022  Current Treatment Recommendations: Strengthening, ROM, Patient/Caregiver education & training, Coordination training, Positioning  Plan Comment: continue OT       Therapy Time  Individual Time In: 1345  Individual Time Out: 6096  Minutes: 46  Timed Code Treatment Minutes: 46 Minutes       Electronically signed by Janneth Hernandez OT  on 8/11/2022 at 2:43 PM       Treatment Charges: Mins Units Time In/Out   [] Evaluation       []  Low       []  Moderate       []  High      []  Electrical Stim      []  Iontophoresis      []  Paraffin      []  Ultrasound        []  Masage      []  Neuromuscular Re-ed      []  ADL      [x]  Ther Exercise 46 3    []  Ther Activities      []  Neuro Re-Ed      []  Splinting      []  Other      Total Treatment time 46 min 3          POC NOTE

## 2022-08-11 NOTE — FLOWSHEET NOTE
[facility]   [address]   Phone: [phone]   Fax: [phone]    Physical Therapy Daily Treatment Note      Date:  2022  Patient Name:  Diana Hylton    :  1960  MRN: [id number]  Physician: Nesha Julien MD                            Insurance: Medicare - based on [place] -- [facility] Diagnosis:   Z91.294R (ICD-10-CM) - Spinal cord injury, cervical region, sequela ([facility] [address]   G82.54 (ICD-10-CM) - Incomplete quadriplegia at C5-6 level ([facility] [address]   G83.4 (ICD-10-CM) - Cauda equina syndrome ([facility] [address]   Rehab Codes: R25 pain , R53.1 weakness, Z74.0 reduced mobility, R29.6 high fall risk, R27.8 lack of coordination, R26.89 gait abnormality   Date of symptom onset: 21 -- cauda equina; 22 Cervical surgeries   Next 's appt.: 10/12/22 with Neurosurgery; 22 with PMR Dr. Suzzette Bumpers   Total Visits:   Cx/Ns:0/0   Date of initial visit: 22        Date of PN: 22 (visit 20); Precautions: Can now wean out of cervical collar; not lifting greater than a gallon of milk: Left AFO      Subjective:  Patient arrives without ankle brace today, stating she forgot to put it on. She reports feeling like she walks better without it. Pain:  [x] Yes  [] No Location: Neck down to (B) Shoulders/UEs to knuckles in hands (B) LE's int feet and toes    Pain Rating: (0-10 scale) 5/10  Pain altered Tx:  [x] No  [] Yes  Action:  Comments:       Objective:   Exercises:  Exercise Reps/ Time Weight/ Level Completed  Today Comments   nustep  5' Level 5 x           Cervical stretching        Upper trap stretch  2x30s ea    Therapist over pressure    Levator scap stretch  2x30s ea    Therapist overpressure    Rotation stretch  2x30s ea    Therapist overpressure    Doorway pec stretch  2x30s             Postural strengthening     Half roll behind in sitting for posture promotion.     Seated rows  2x15 green  x    Seated horizontal ABd 2x15 green x    Seated B ER  2x15  green  With scap squeeze at end   Standing resisted rotations  2x10 ea  green  Good challenge for balance           MAT LEVEL     Elevated with large wedge and 2 pillows    SKTC  2x30s ea   L ONLY    HS stretch  2x30s ea   L ONLY    Figure 4 stretch  2x30s ea   L ONLY    glute bridges with band  x15 EO   x15 EC  Red   Felt increased challenge with eyes closed due to proprioceptive deficits    Isolated hooklying clams   2x15 ea  Green    Isolated makes non moving limb stabilize    4 way ankle 2x15e  yellow x           Standing       Resisted STS    2x10   Green   From mat table    Sit to stand without hands  2x10   CGA to Miryam.  Cues for momentum and foot placement     Step ups Fwd/Lat 10x2 6\", 2#  unilateral UE support    3 way hip w/ eyes closed  10x ea  2 sets 3# aw  With BUE support, eyes closed to build proprioceptive input     Slow Controlled Marches 2-3\" to raise and lower 10x  2 sets  No UE support   CGA    Forced WB on 4\" step + 1/2 ball 2x30\" each   Encouraged light touch; WB L LE more difficult than R LE   Slow high march w/ opposite UE raise 10x2 ea 3# L LE x 8/11: used 2.5# as 3# N/A   Heel/toe raises  20x   SPC for support   Semi tandem Stance  3x30\"   NO UE Support, CGA    EC Foam feet Normal Stance 2x30\"   x NO UE Support CGA   EO/ EC Foam Feet touching 3x30''   NO UE support  Alternated EO/EC   Standing on foam with press forward & press up  2x10ea 4# ball  x    Feet together Eye Closed Firm surface 2x30\"    NO UE Support- cues at hips as weight is shifted over LLE     Toe taps 6\" step + 1/2 ball 10x2 ea 2#  Unilateral UE support    Basil step overs w/ dual tasking  2x15 6 in basil  UE support with quad cane and pt naming nominal categories for dual tasking challenge    Resisted side stepping  2x10 ea yellow  HHA with therapist    Step up to foam w/ SLS 10x5'' ea  x SPC for support                 GAIT TRAINING        Ambulation with SBQC  x75ft   SBA -- cues for heel contact to get larger step lengths    Ambulation with SPC  120ft 2.5# x CGA     Weaving through cones  X6 passes    CGA -- SBA    Walking with LLE taps to cones with SPC X4 passes  5x cones   CGA -- has x1 LOB needing modA to recover d/t stepping her L foot on her R foot    Walking with RLE taps to cones with SPC  X4 passes  5x cones  CGA -- has a few stumbles but able to self recover   Straight leg kicks with SPC 2x30ft   x CGA    Backwards walking with SPC  2x25ft   CGA    Walking with horizontal head turns  2x20ft    Moderate instability    Walking with vertical head turns  2x20ft    Moderate instability    Marching with SPC 2x30ft  x CGA   Walking uneven surfaces with SPC 3x20ft   2 cobble foam and long foam put together; CGA. 1xminA for LOB on cobble foam   Curb steps with SBQC  x8 4\"  CGA to Miryam -- trialed with ea leg leading ascending and descending    ambulation with quad cane and ankle wt  [id number] 3# aw   CGA-- focusing on larger step lengths           6mWT    460ft 7/27/22   Other:   7/29: 5' spent  on educating on the brace and how to don. Then ambulated x90ft with SPC to ensure straps donned appropriately. Specific Instructions for next treatment: Work on ambulation with SPC-- Complete optimal that is in soft chart -- Add in postural strengthening and L ankle strengthening; consider adding KT tape to L ankle to reduce foot drop. Work on balance and strength with eyes closed to improve proprioception      Assessment:    [x] Progressing toward goals. Began session with nustep, followed by ankle strengthening at mat table. Patient requires stability at mid-calf to prevent compensation with hip ER/IR for the EV/IV exercises. Continued with standing activities incorporating balance strategies and ankle stability/strengthening. Improved L ankle control demonstrated by patient as she was able to decrease foot drop and eversion with standing interventions.  Continued with EC balance interventions to improve proprioception. Patient initially had increased difficulty with this due to L ankle instability on foam, but was able to correct after instruction given to push through her great toe. Ended session with postural strengthening to continue decreasing forward flexed posture and rounded shoulders. Overall, patient demonstrated improvement with L ankle stability and posture today. [] No change. [] Other:    [x] Patient would continue to benefit from skilled physical therapy services in order to: improve strength,mobiltiy and motor control that impacts balance and mobility. GOALS -- assessed 6/15/22 & 7/21/22  STG: (to be met in 10 treatments)   Pt will improve score on ESCOBEDO balance scale by >/=6 points to improve overall balance stability and decrease fall risk with mobility. - 6/15: MET -- exceeds  Pt will demonstrate >10 SLS stability with light UE support to safely allow pt to enter/exit her tub shower. - 6/15: partially met -- light UE support for RLE, still difficulty on the L        - 7/21: progressing -- minUE support for stability on LLE   Pt will improve time on TUG to <20s with LRAD at mod IND level to demonstrate decreased fall risk. - 6/15: progressing -- 10 second improvement to 25.5s         - 7/21: MET with 2WW reaching 15.7s; will continue goal to see if she can achieve with cane   Pt will increase gait speed to >0.8 m/s  With LRAD at mod I level to demonstrate decreased fall risk and optimize mobility to at community level. - 6/15: limited increase in speed but pt is now mod IND with 2WW        - 7/21: progressing-- significant improvement in gait speed with 2WW to 0.67m/s & 0.46m/s with SPC   Pt will be able to manage her walker up 1 step without instability to improve independence with access to her home.   - 6/15: progressing   - 7/21: MET    Pt will improve time on 5xSTS to <15 seconds with use of 1 UE to decrease fall risk and increase functional capacity for mobility. - 6/15: completes with unilateral UE support this date but time is increased. - 7/21: progressing -- reaches 15.09s with 1 UE support   LTG: (to be met by end of POC )  Pt will improve score on ESCOBEDO balance scale by >/= 45/56 points  to improve overall balance stability and decrease fall risk with mobility. - 6/15: progressing -- 33/56          - 7/21: progressing -- improves score ot 38/56   Pt will increase strength of all major muscle groups of the LEs to 4/5 or greater demonstrating improved strength needed to maximize safety and efficiency with mobility tasks such as gait, transfers and stairs. - 6/15: progressing  -7/21; Progressing -- great progress made with LE strength, especially L ankle strength now all being above 3/5  Pt will be independent with home program addressing strength, flexibility and balance to maximize gains made in therapy to improve overall functional capacity for mobility.     -6/15: progressing -- will continue to update in POC  -7/21: progressing -- will continue to update in POC  Pt will improve score on OPTIMAL to 46/90 to demonstrate functional improvements with ADLs.   -6/15: will assess at a later time    - 7/21: will assess next session   Pt will report no falls for x6 weeks demonstrating improved safety with mobility and implementation of fall prevention strategies. - 6/15: meeting currently with no falls noted  - 7/21; MET -- no falls noted, verbalizes awareness of strategies to decrease falls   6. NEW GOAL 7/12: Pt will improve AROM in of cervical ROM to >60 bilaterally to improve ability to visually scan while driving.              -7/21; Progressing   7. NEW GOAL 7/12: Pt will be able to maintain at least a neutral cervical alignment for > 75% of session showing improved cervical strength for keeping her head up to visualize her environment.              -7/21: progressing   8. NEW GOAL 7/12:  Pt will demonstrate improved postural awareness with only minimal cues to correct posture throughout session to prevent undue stress to spine.               -7/21; Progressing   * further goals may be established based on additional assessments or progress*         Patient stated Goals: to walk without a walker, improve balance     Pt. Education:  [x] Yes  [] No  [x] Reviewed Prior HEP/Ed  Method of Education: [x] Verbal  [x] Demo  [] Written  Comprehension of Education:  [x] Verbalizes understanding. [] Demonstrates understanding. [] Needs review. [] Demonstrates/verbalizes HEP/Ed previously given.      Plan: [x] Continue per POC    [] Other:          Treatment Charges: Mins Units   []  Modalities     [x]  Ther Exercise 43 3   []  Manual Therapy     []  Ther Activities     []  Aquatics     [x]  Neuromuscular 10 1   [] Vasocompression     [] Gait Training     [] Dry needling        [] 1 or 2 muscles        [] 3 or more muscles     []  Other     Total Treatment time 53 4   * KX modifier needed*     Time In: 12:45 p       Time Out:  1:38p      Electronically signed by:  Kushal Funez PTA

## 2022-08-16 ENCOUNTER — HOSPITAL ENCOUNTER (OUTPATIENT)
Dept: OCCUPATIONAL THERAPY | Age: 62
Setting detail: THERAPIES SERIES
Discharge: HOME | End: 2022-08-16
Payer: MEDICARE

## 2022-08-16 ENCOUNTER — HOSPITAL ENCOUNTER (OUTPATIENT)
Dept: PHYSICAL THERAPY | Age: 62
Setting detail: THERAPIES SERIES
Discharge: HOME | End: 2022-08-16
Payer: MEDICARE

## 2022-08-16 PROCEDURE — 97110 THERAPEUTIC EXERCISES: CPT

## 2022-08-16 PROCEDURE — 97112 NEUROMUSCULAR REEDUCATION: CPT

## 2022-08-16 PROCEDURE — 97530 THERAPEUTIC ACTIVITIES: CPT

## 2022-08-16 ASSESSMENT — PAIN DESCRIPTION - DESCRIPTORS: DESCRIPTORS: TIGHTNESS;TINGLING

## 2022-08-16 ASSESSMENT — PAIN DESCRIPTION - PAIN TYPE: TYPE: ACUTE PAIN

## 2022-08-16 ASSESSMENT — PAIN DESCRIPTION - FREQUENCY: FREQUENCY: CONTINUOUS

## 2022-08-16 ASSESSMENT — PAIN DESCRIPTION - ORIENTATION: ORIENTATION: LEFT

## 2022-08-16 ASSESSMENT — PAIN DESCRIPTION - LOCATION: LOCATION: ARM;BACK;NECK;LEG

## 2022-08-16 ASSESSMENT — 9 HOLE PEG TEST
TESTTIME_SECONDS: 23
TESTTIME_SECONDS: 25

## 2022-08-16 ASSESSMENT — PAIN SCALES - GENERAL: PAINLEVEL_OUTOF10: 4

## 2022-08-16 NOTE — PROGRESS NOTES
[address]   [address] [facility] [address]   Phone: [phone]   Fax: [phone]    Physical Therapy Daily Treatment Note/Progress Note       Date:  2022  Patient Name:  Reema Welch    :  1960  MRN: [id number]  Physician: Theresa Hess MD                            Insurance: Medicare - based on [place] -- [address] [place] [address] Diagnosis:   T63.611B (ICD-10-CM) - Spinal cord injury, cervical region, sequela ([facility]   G82.54 (ICD-10-CM) - Incomplete quadriplegia at C5-6 level ([facility]   G83.4 (ICD-10-CM) - Cauda equina syndrome ([facility]   Rehab Codes: R25 pain , R53.1 weakness, Z74.0 reduced mobility, R29.6 high fall risk, R27.8 lack of coordination, R26.89 gait abnormality   Date of symptom onset: 21 -- cauda equina; 22 Cervical surgeries   Next 's appt.: 10/12/22 with Neurosurgery; 22 with PMR Dr. Sebastián Kline   Total Visits:   Cx/Ns:0/0   Date of initial visit: 22        Date of PN: 22 (visit 20); 22 (visit 27)     Formal progress note reporting period: 22- 22        Precautions: Can now wean out of cervical collar; not lifting greater than a gallon of milk:       Subjective:  Patient arrives with 2WW and wearing the ankle brace. She notes she is not wearing the brace at home as she is barefoot. Still getting occassional rolling of the L ankle but does not report any falls. In the last month she feels she is able to walk better, walk for longer, and more stable. She is able to take some steps without any devices for limited distances. She notes she went to the grocery store prior to session leading to some baseline fatigue. She notes she is consistently walking while at the grocery store. At home she still is having difficulty with balance with reaching into her dryer. Also having difficulty in lifting her LLE over higher barriers such as he tub. Has to have her  assist her.  Pt also wants to have greater endurance to be able to be active in the community. Feels that her legs get tired and burn with prolonged distances . Pain:  [x] Yes  [] No Location: Neck down to (B) Shoulders/UEs to knuckles in hands (B) LE's int feet and toes    Pain Rating: (0-10 scale) 5/10  Pain altered Tx:  [x] No  [] Yes  Action:  Comments:       Objective:  Tests & measures 8/16/22   sitting   Strength  Left Strength  Right   Hip Flexion 4 5   Hip Abduction 4 4+   Hip Adduction 4 5   Hip Extension  4 4    Hip ER       Hip IR       Knee Flexion 4- 5   Knee Extension 4+ 5   Dorsiflexion 4 4   Plantar flexion 4+ 5   Inversion 4  5   Eversion 3+  4     CERVICAL ROM  degrees  7/12/22 Degrees   8/16/22  comments   Cervical flexion 30 48     Cervical extension 22 28     R Cv sidebending  20 30     L Cv sidebending 22 26     R Cv rotation 45 52     L Cv rotation 30 48       GENERAL COMMENTS:   - improving in postural alignment. Able to keep her head upright   - gait: working now with [facility] with SBA, limited R step length due to decreased L stance control. No toe dragging noted. Better ankle control with limited inversion noted without brace on      Measures 5/10/22  6/15/22 7/21/22  8/16/22    ESCOBEDO 19/56  33/56 38/56 (5 pt improvement)  44/56   5xSTS 19. .5   27.46s -- with only 1 UE support  37.09s w/o UE support  15.09s w/ 1UE support     23.48s w/o UE support   15.0s with 1 UE support    10mWT 0.48m/s  20.35s >> 0.49 m/s with 2WW  15.7s with 2WW >> 0.67m/s  21.75s with quad cane >> 0.46m/s     Endurance   4:30 with 2WW, reaching 380ft  Will assess next session (460ft) with quad cane CGA 262ft with SPC, SBA    TUG 35.5 s with 2WW, CGA   25.5s with 2WW, mod IND  16.5s with 2WW  23.0s with quad cane 27.9s  & 23.4s with SPC    SLS L    w/o UE support 2s  W/ mod UE support 10s w/o UE support 2s  W/ min UE support 10s W/o UE support 2s   W/ min UE support 15    SLS R    wlo UE support 2s  W/ light UE support 10s wlo UE support 3s  W/ light UE support 25s  W/o UE support 4s       Exercises:  Exercise Reps/ Time Weight/ Level Completed  Today Comments   nustep  5' Level 5            Cervical stretching        Upper trap stretch  2x30s ea    Therapist over pressure    Levator scap stretch  2x30s ea    Therapist overpressure    Rotation stretch  2x30s ea    Therapist overpressure    Doorway pec stretch  2x30s             Postural strengthening     Half roll behind in sitting for posture promotion. Seated rows  2x15 green      Seated horizontal ABd  2x15 green     Seated B ER  2x15  green  With scap squeeze at end   Standing resisted rotations  2x10 ea  green  Good challenge for balance           MAT LEVEL     Elevated with large wedge and 2 pillows    4 way ankle 2x15e  yellow x           Standing       Resisted STS    2x10   Green   From mat table    Sit to stand without hands  2x10   CGA to [place].  Cues for momentum and foot placement     Step ups Fwd/Lat 10x2 6\", 2#  unilateral UE support    3 way hip w/ eyes closed  10x ea  2 sets 3# aw  With BUE support, eyes closed to build proprioceptive input     Slow Controlled Marches 2-3\" to raise and lower 10x  2 sets  No UE support   CGA    Forced WB on 4\" step + 1/2 ball 2x30\" each   Encouraged light touch; WB L LE more difficult than R LE   Slow high march w/ opposite UE raise 10x2 ea 3# L LE  8/11: used 2.5# as 3# N/A   Heel/toe raises  20x   SPC for support   Semi tandem Stance  3x30\"   NO UE Support, CGA    EC Foam feet Normal Stance 2x30\"    NO UE Support CGA   EO/ EC Foam Feet touching 3x30''   NO UE support  Alternated EO/EC   Standing on foam with press forward & press up  2x10ea 4# ball      Feet together Eye Closed Firm surface 2x30\"    NO UE Support- cues at hips as weight is shifted over LLE     Toe taps 6\" step + 1/2 ball 10x2 ea 2#  Unilateral UE support    Basil step overs w/ dual tasking  2x15 6 in basil  UE support with quad cane and pt naming nominal categories for dual tasking challenge Resisted side stepping  2x10 ea yellow  HHA with therapist    Step up to foam w/ SLS 10x5'' ea   SPC for support   Sliders - fwd, lateral, bkwd  X10 ea   x           GAIT TRAINING        Ambulation with SBQC  x75ft   SBA -- cues for heel contact to get larger step lengths    Ambulation with SPC  120ft 2.5#  CGA     Weaving through cones  X6 passes    CGA -- SBA    Walking with LLE taps to cones with SPC X4 passes  5x cones   CGA -- has x1 LOB needing modA to recover d/t stepping her L foot on her R foot    Walking with RLE taps to cones with SPC  X4 passes  5x cones  CGA -- has a few stumbles but able to self recover   Straight leg kicks with SPC 2x30ft   x CGA    Backwards walking with SPC  2x25ft   CGA    Walking with horizontal head turns  2x20ft    Moderate instability    Walking with vertical head turns  2x20ft    Moderate instability    Marching with SPC 2x30ft  x CGA   Walking uneven surfaces with SPC 3x20ft   2 cobble foam and long foam put together; CGA. 1xminA for LOB on cobble foam   Curb steps with SBQC  x8 4\"  CGA to [place] -- trialed with ea leg leading ascending and descending    ambulation with quad cane and ankle wt  [id number] 3# aw   CGA-- focusing on larger step lengths                  Other:   8/16: educated on the return to driving process and that she needs to talk with MD about this at next visit. Discussed from a PT stand point she has intact R side that would be sufficient for driving. Would want to get OT opinion on L hand use for turn signal. Another limiting factor is cervical ROM but feel this is improving and she would be ready for driving assessment. 7/29: 5' spent  on educating on the brace and how to don. Then ambulated x90ft with SPC to ensure straps donned appropriately.      Specific Instructions for next treatment: Work on L stance control and weight translation to increase R step lengths, work on obstacle course with SPC, work on endurance     Assessment:    [x] Progressing toward goals. Progress note completed as approaching end of current POC and pt to see MD tomorrow. She has now completed x27 visits and is showing good progress. Since last progress note (only 7 visits) she is demonstrating improved LE strength, improving cervical ROM, ambulating with a lesser device (SPC) with less assistance (SBA), and improved postural alignment/endurance. She is able to maintain a head neutral alignment without fatigue in session. Still forward rounded but not sure if fully correctable given pre-morbid forward rounded/kyphotic posture. Her improved LLE strength has led to her not needing an AFO and at times no L ankle brace and still getting good control with ambulation. With ambulation is now working solely with [facility] with SBA which is a significant improvement towards pt's goal. She is still slow and inefficient with SPC as she has limited stance control and anterior weight translation L. This can further be improved with more sessions. Due to her improvements, encouraged to continue with SPC at home and use 2WW only for the community. She has improvements on most outcome measures assessed this date. ESCOBEDO increases by 6 pt (significant) to 44/56. Could still improve more to reach > 45/56 which would indicate decreased all risk. Her 5xSTS time is improving significantly as she improves 14 secs without UE support showing functional transfers are improving. On the 6mWT she reaches a lesser distance with the [facility] but feel this will continue to improve. Her TUG is still improving as she is now about 23 seconds with a SPC at [facility]. Will want to see this improve to decrease fall risk with AD. Feel that patient is continuing to show improvements in functional outcomes and doing more at home and the community that supports she has further room to improve to be a safe and efficient community ambulator. Feel that extending her POC total of 37 visits will a lot time to further improve and reach goals.  Will re-assess near the end of these visits to determine next steps, but feel she will likely be ready to transition to home program.       [] No change. [] Other:    [x] Patient would continue to benefit from skilled physical therapy services in order to: improve strength,mobiltiy and motor control that impacts balance and mobility. GOALS -- updated 8/16/22  Goals to Continue for POC:   Pt will demonstrate >10 SLS stability with light UE support to safely allow pt to enter/exit her tub shower. - 6/15: partially met -- light UE support for RLE, still difficulty on the L        - 7/21: progressing -- minUE support for stability on LLE    - 8/16: progressing -- minUE support still on LLE but able to go for longer time (15s)          3. Pt will improve time on TUG to <20s with LRAD at mod IND level to demonstrate decreased fall risk. - 6/15: progressing -- 10 second improvement to 25.5s         - 7/21: MET with 2WW reaching 15.7s; will continue goal to see if she can achieve with cane         - 8/16: progressing with SPC - 23.5 sec this date   Pt will increase gait speed to >0.8 m/s  With LRAD at mod I level to demonstrate decreased fall risk and optimize mobility to at community level. - 6/15: limited increase in speed but pt is now mod IND with 2WW        - 7/21: progressing-- significant improvement in gait speed with 2WW to 0.67m/s & 0.46m/s with [facility]    -8/16: progressing with use of SPC -- will assess formally in upcoming sessions. Pt will improve score on ESCOBEDO balance scale by >/= 45/56 points  to improve overall balance stability and decrease fall risk with mobility.         - 6/15: progressing -- 33/56          - 7/21: progressing -- improves score ot 38/56   -8/16: progressing -- 44/56 greater improvement    Pt will increase strength of all major muscle groups of the LEs to 4/5 or greater demonstrating improved strength needed to maximize safety and efficiency with mobility tasks such as gait, transfers and stairs. - 6/15: progressing  -7/21; Progressing -- great progress made with LE strength, especially L ankle strength now all being above 3/5  - 8/16: progressing -- almost met but still working on L ankle eversion strength          3. Pt will be independent with home program addressing strength, flexibility and balance to maximize gains made in therapy to improve overall functional capacity for mobility.     -6/15: progressing -- will continue to update in POC  -7/21: progressing -- will continue to update in POC  -8/16: progressing -- will continue to update in POC, notes good compliance           4. Pt will improve score on OPTIMAL to 38/90 to demonstrate functional improvements with ADLs. 6. NEW GOAL 7/12: Pt will improve AROM in of cervical ROM to >60 bilaterally to improve ability to visually scan while driving.              -7/21; Progressing    -8/16: progressing -- 52 deg to R this date; 48 deg to L this date   8. NEW GOAL 7/12: Pt will demonstrate improved postural awareness with only minimal cues to correct posture throughout session to prevent undue stress to spine.               -7/21; Progressing    -8/16: progressing   9. New goal 8/16/22: Pt will be able to navigate curbs, uneven surfaces, and inclines/declines with [facility] with distant supervision to ensure She can safely navigate the community & get outdoors more. 10. New goal 8/16/22: Pt will be able to ambulate >500ft with 6mWT with SPC to demonstrate improved endurance and efficiency needed for community ambulation. Goals MET in POC:    Pt will improve score on ESCOBEDO balance scale by >/=6 points to improve overall balance stability and decrease fall risk with mobility. - 6/15: MET -- exceeds  Pt will be able to manage her walker up 1 step without instability to improve independence with access to her home.   - 6/15: progressing   - 7/21: MET    Pt will improve time on 5xSTS to <15 seconds with use of 1 UE to decrease fall risk and increase functional capacity for mobility. - 6/15: completes with unilateral UE support this date but time is increased. - 7/21: progressing -- reaches 15.09s with 1 UE support   -8/16: MET -- 15s this date   Pt will improve score on OPTIMAL to 46/90 to demonstrate functional improvements with ADLs.   -6/15: will assess at a later time    - 7/21: will assess next session   - 8/16: MET 43/90 this date   Pt will report no falls for x6 weeks demonstrating improved safety with mobility and implementation of fall prevention strategies. - 6/15: meeting currently with no falls noted  - 7/21; MET -- no falls noted, verbalizes awareness of strategies to decrease falls   7. NEW GOAL 7/12: Pt will be able to maintain at least a neutral cervical alignment for > 75% of session showing improved cervical strength for keeping her head up to visualize her environment.              -7/21: progressing    -8/16: MET -- good alignment for all of session   8. NEW GOAL 7/12: Pt will demonstrate improved postural awareness with only minimal cues to correct posture throughout session to prevent undue stress to spine.               -7/21; Progressing         Patient stated Goals: to walk without a walker, improve balance     Pt. Education:  [x] Yes  [] No  [x] Reviewed Prior HEP/Ed  Method of Education: [x] Verbal  [x] Demo  [] Written  Comprehension of Education:  [x] Verbalizes understanding. [] Demonstrates understanding. [] Needs review. [] Demonstrates/verbalizes HEP/Ed previously given.     PLAN:   -- see below       Treatment Plan:  [x] Therapeutic Exercise   14260  [] Iontophoresis: 4 mg/mL Dexamethasone Sodium Phosphate  mAmin  64058   [x] Therapeutic Activity  49016 [] Vasopneumatic cold with compression  30482    [x] Gait Training   05793 [] Ultrasound   18350   [x] Neuromuscular Re-education  73219 [] Electrical Stimulation Unattended  39178   [x] Manual Therapy  00516 [] Electrical Stimulation Attended  [id number]   [x] Instruction in HEP  [] Lumbar/Cervical Traction  [id number]   [] Aquatic Therapy   [id number] [] Cold/hotpack    [] Massage   [id number]      [] Dry Needling, 1 or 2 muscles  14244   [] Biofeedback, first 15 minutes   29134  [] Biofeedback, additional 15 minutes   16044 [] Dry Needling, 3 or more muscles  62859      Patient Status:     [] Continue per initial plan of care. [x] Additional visits necessary. -- extending POC due to progress and new goals to total of 37 visits     [] Other:     Requested Frequency/Duration: 2 times per week for total of 37 treatments.         Treatment Charges: Mins Units   []  Modalities     []  Ther Exercise     []  Manual Therapy     [x]  Ther Activities 45 3   []  Aquatics     [x]  Neuromuscular 10 1   [] Vasocompression     [] Gait Training     [] Dry needling        [] 1 or 2 muscles        [] 3 or more muscles     []  Other     Total Treatment time 55 4   * KX modifier needed*     Time In: 2;15 p       Time Out:  3;10p      Electronically signed by:  Benito Sparks, PT

## 2022-08-16 NOTE — PROGRESS NOTES
[address]         Phone: [phone]       Fax: [phone]     Occupational Therapy Daily Treatment note     Occupational Therapy: Daily Note   Patient: Mt Best (87 y.o. female)   Examination Date: 2022  No data recorded  Progress Note Counter: MD appt 8-17-22     :  1960 # of Visits since [facility]:   26   MRN: [id number]  CSN: [id number] Start of Care Date: 05/10/2022   Insurance: Payor: MEDICARE / Plan: MEDICARE PART A AND B / Product Type: *No Product type* /   Insurance ID: [id number] - (Medicare) Secondary Insurance (if applicable): Insurance Information: Medicare   Referring Physician: MD Dr Romel Regan   PCP: Brandy Harrison MD Visits to Date/Visits Approved:     No Show/Cancelled Appts:   /       Medical Diagnosis: Unspecified injury at unspecified level of cervical spinal cord, sequela [S14.109S]  Quadriplegia, C5-C7 incomplete [G82.54]  Cauda equina syndrome [G83.4] spinal cord injury , cervical region sequela (S14.109S), incomplete quadriplegia at C5-6 level (G82.54), cauda equina syndrome ( G83.4) - ICD-10 codes  Treatment Diagnosis: bilateral UE weakness & impaired coordination, bilateral shoulder stiffness, impaired sensation - hands lt more than rt  (M62.81, R27.9, M25.611, M25.612,R20.2        SUBJECTIVE EXAMINATION   Pain Level: Pain Screening  Patient Currently in Pain: Yes  Pain Assessment: 0-10  Pain Level: 4  Post Treatment Pain Level: 4  Patient's Stated Pain Goal: 0 - No pain  Pain Type: Acute pain  Pain Location: Arm, Back, Neck, Leg  Pain Orientation: Left  Pain Descriptors: Tightness, Tingling (tingling in finger tiips.)  Pain Frequency: Continuous    Patient Comments: Subjective: Pt states she is feeling a little better pain wise. Pt states that she is doing more at home.  She uses her reacher to get clothes out of dryer.    HEP Compliance: Good       OBJECTIVE EXAMINATION   Restrictions: Restrictions/Precautions: Fall Risk; General Precautions          Left AROM  Right AROM      LUE AROM (degrees)  LUE General AROM: Elbow, forearm,wrist - wfls  L Shoulder Flexion 0-180: 150 - increase rom  L Shoulder ABduction 0-180: 175 - increase  L Shoulder Int Rotation  0-70: 86 - increase  L Shoulder Ext Rotation  0-90: 84 - increase  Left Hand AROM (degrees)  Left Hand AROM: WFL  Left Hand General AROM: flex,extension, abduction/adduction - wfls  L Thumb Opposition: wfls RUE AROM (degrees)  RUE General AROM: Elbow, forearm, wrist - wfls  R Shoulder Flexion 0-180: wfls  R Shoulder ABduction 0-180: 175 -increase  R Shoulder Int Rotation  0-70: 80  R Shoulder Ext Rotation 0-90: 90  Right Hand AROM (degrees)  Right Hand AROM: WFL  Right Hand General AROM: flex,extension, abduction/adduction - wfls  R Thumb Opposition: wfls       Left PROM  Right PROM      LUE PROM (degrees)  LUE General PROM: PROM lt shoulder, elbow,forear,,  wrist, hand - wfls  Left Hand PROM (degrees)  Left Hand PROM: WFL RUE PROM (degrees)  RUE General PROM: PROM rt shoulder,elbow,forearm, wrist , hand - wfls  Right Hand PROM (degrees)  Right Hand PROM: WFL      Left  Right     Strength  Handle Setting 2: average 41.67#  Trial 1: 45  Trial 2: 40  Trial 3: 40  Average: 41.67 Handle Setting 2: average 61.67#  Trial 1: 60  Trial 2: 65  Trial 3: 60  Average: 61.67   Pinch Strength (if applicable) Left Hand Strength - Lateral Pinch (lbs)  Trial 1: 12  Trial 2: 13  Trial 3: 13  Average: 12.67  Left Hand Strength - Farrell (lbs)  Trial 1: 11  Trial 2: 11  Trial 3: 13  Average: 11.67  Left Hand Strength - Tip (lbs)  Trial 1: 6  Trial 2: 5  Trial 3: 7  Average: 6 Right Hand Strength - Lateral Pinch (lbs)  Trial 1: 12  Trial 2: 12  Trial 3: 11  Average: 11.67  Right Hand Strength - Farrell (lbs)  Trial 1: 10  Trial 2: 10  Trial 3: 10  Average: 10  Right Hand Strength - Tip (lbs)  Trial 1: 8  Trial 2: 7  Trial 3: 8  Average: 7.67     Fine Motor Skills:   Fine Motor Skills  Left 9 Hole Peg Test Time (secs): 25  Right 9 Hole Peg Test Time (secs): 23  TREATMENT     Exercises:     Treatment Reasoning    OT Exercise 1: PROM B UE,shoulders, wrist/hand  OT Exercise 11: BTE #162 hand gripper, T = 30in lbs, 75 seconds(rt hand/lt hand);  pinch , T = 25 in lbs, 85 seconds. (rt hand/lt hand)  OT Exercise 13: orange power web (light resistance) gripping bilateral hands 2 sets  25x, push for hand/wrist extension  rt/lt  2 sets 25x  OT Exercise 18: BTE tool 122 ergonometer T =5 lb (80 seconds forward, 80 seconds backward)  OT Exercise 20: BTE tool 302 flat knob (RD/UD) to simulate open jar/bottle : T = 6 inlb  (rt hand 75 seconds, lt hand  75 seconds) - dycem used    Limitations addressed: Strength, Coordination, Flexibility (ROM)  Therapist provided: Verbal cuing  Progressed: Resistance  Progressed: Increase torque BTE tool 302. ASSESSMENT     Assessment: Assessment: Tx focus on bilateral UE rom & strengthening exercises. See OT exercises for detail. Pt demo improving bilateral shoulder passive/active/functional rom, & lt hand dexterity. Pt reports doing more at home. Pt has weakness bilateral tip pinch, slower rt hand dexterity with testing. Pt will benefit from continued OT to improve bilateral UE strength for daily activities. Performance deficits / Impairments: Decreased ROM, Decreased strength, Decreased coordination, Decreased fine motor control, Decreased high-level IADLs, Decreased sensation    Post-Treatment Pain Level: 4    Prognosis: Good    Activity Tolerance: Patient tolerated treatment well             GOALS        08/16/22 1330   Patient Goals    Patient goals  To use her arms /hands as best she can to function.   Pt making progress   Short Term Goals   Short Term Goal 1 Pt will demo & verbalize independence with bilateral UE HEP   STG Goal 1 Status: Met   Short Term Goal 2 Pt will report improving independence & less difficulty with fine motor activities. Pt will complete 9 hole peg test 4 seconds quicker with rt hand, & 5 seconds quicker with lt hand. STG 2 Current Status: Goal Met for lt hand, Goal Partially Met for rt hand. STG Goal 2 Status: Met;Partially met; In progress   Short Term Goal 3 Increase bilateral hand strength for daily activities: increase rt ( by 5# & tip pinch by 2#), increase lt ( by 5#, lateral & tip pinch by 2#)   STG 3 Current Status: Goal Met bilateral , lt lateral pinch. Goal Not Met bilateral tip pinch - goal ongoing. STG Goal 3 Status: Met; In progress; Not Met   Short Term Goal 4 Increase bilateral shoulder functional AROM by 10 : lt shoulder (fleixon, abduction, IR,ER), & rt shoulder (flexion, abduction, ER ) to improve reaching for selfcare & reach into cabinets. STG 4 Current Status: Goal Met bilateral shoulder AROM. STG Goal 4 Status: Met   Long Term Goals   Long Term Goal 3 compared to eval increase PROM: rt shoulder flexion & IR by 10, lt shoulder flexion, abduction, IR by 10   LTG 3 Current Status: Goal Met bilateral shoulder PROM. LTG Goal 3 Status: Met   Long Term Goal 5 Compared to eval increase lt hand strength:  by 10# & tip pinch by 4, & improve lt hand fine motor dexterity by 10 seconds   LTG 5 Current Status: Goal Met bilateral , lt hand dexterity with testing. Goal ongoing for tip pinch   LTG Goal 5 Status: Met; In progress   Long Term Goal 6 compared to eval increase AROM lt shoulder (flexion & abduction) by 15-20   LTG 6 Current Status: Goal Met shoulder flexion & abduction.      TREATMENT PLAN   REQUIRES OT FOLLOW-UP: Yes  Type: Outpatient  Plan  Plan Frequency: 2x/week  Plan Weeks: 20 visits+ 10 more visits after 07/027/2022  Current Treatment Recommendations: Strengthening, ROM, Patient/Caregiver education & training, Coordination training, Positioning  Plan Comment: continue OT 5 more visits Therapy Time  Individual Time In: 1330  Individual Time Out: 5750  Minutes: 45  Timed Code Treatment Minutes: 30 Minutes       Electronically signed by Evgeny Keyes OT  on 8/16/2022 at 5:28 PM       Treatment Charges: Mins Units Time In/Out   [] Evaluation       []  Low       []  Moderate       []  High      []  Electrical Stim      []  Iontophoresis      []  Paraffin      []  Ultrasound        []  Masage      []  Neuromuscular Re-ed      []  ADL      [x]  Ther Exercise 30 2    []  Ther Activities      []  Neuro Re-Ed      []  Splinting      [x]Measurements  15 0    Total time 45 min           POC NOTE

## 2022-08-17 ENCOUNTER — OFFICE VISIT (OUTPATIENT)
Dept: PHYSICAL MEDICINE AND REHAB | Age: 62
End: 2022-08-17
Payer: MEDICARE

## 2022-08-17 VITALS
HEART RATE: 94 BPM | WEIGHT: 135.2 LBS | HEIGHT: 61 IN | BODY MASS INDEX: 25.53 KG/M2 | SYSTOLIC BLOOD PRESSURE: 124 MMHG | TEMPERATURE: 97.5 F | DIASTOLIC BLOOD PRESSURE: 80 MMHG

## 2022-08-17 DIAGNOSIS — G95.9 CERVICAL MYELOPATHY (HCC): Primary | ICD-10-CM

## 2022-08-17 DIAGNOSIS — S14.109S SPINAL CORD INJURY, CERVICAL REGION, SEQUELA (HCC): ICD-10-CM

## 2022-08-17 PROCEDURE — 1036F TOBACCO NON-USER: CPT | Performed by: PHYSICAL MEDICINE & REHABILITATION

## 2022-08-17 PROCEDURE — G8427 DOCREV CUR MEDS BY ELIG CLIN: HCPCS | Performed by: PHYSICAL MEDICINE & REHABILITATION

## 2022-08-17 PROCEDURE — G8419 CALC BMI OUT NRM PARAM NOF/U: HCPCS | Performed by: PHYSICAL MEDICINE & REHABILITATION

## 2022-08-17 PROCEDURE — 99212 OFFICE O/P EST SF 10 MIN: CPT | Performed by: PHYSICAL MEDICINE & REHABILITATION

## 2022-08-17 PROCEDURE — 3017F COLORECTAL CA SCREEN DOC REV: CPT | Performed by: PHYSICAL MEDICINE & REHABILITATION

## 2022-08-17 NOTE — PROGRESS NOTES
[facility] PHYSICAL MEDICINE & REHABILITATION  [address] [place] [address]  [place] [address]  Dept: [phone]  Dept Fax: [phone]    Outpatient Followup Note    Reema Welch, 65 y.o., female, presents for follow up c/o of Other (Nontraumatic cervical spinal cord injury)  . HPI:     HPI  Patient with recent cervical spinal cord injury related to critical cervical stenosis who was admitted to [facility] for inpatient acute rehabilitation from 4/15/22 - 4/29/22 who is being seen in follow up today. She reports no trips or falls since discharging home. She does c/o reduced endurance which is gradually improving. She has some tingling in B fingers and B feet which is stable. She has not been cleared to drive yet but would like to consider it when appropriate. She reports therapy is going well. Dr. Jigna Del Castillo note from 7/6/22 reviewed. He notes significant improvement with her myelopathy and function. Plan for x-rays and follow up in 3 months.      Spinal Cord    Mobility: cane and walker  Bowel: Oral Medications  : Spontaneous  Sexual:  No current issues  Skin:  Ankle swelling  DME: Straight Cane and Rolling Walker  Mood: WNL  Therapy: Physical Therapy and Occupational Therapy outpatient at [facility]  Support:spouse / significant other  Spasticity:None     Past Medical History:   Diagnosis Date    Anxiety     Arthritis     At maximum risk for fall 12/29/2021    caudal equina syndrome and cervical stenosis    Cancer ([address]     right breast    Cauda equina syndrome ([address] 12/29/2021    neuropathy, mobility difficulty, incontinance    Cervical stenosis of spine 12/29/2021    GERD (gastroesophageal reflux disease)     History of stomach ulcers     Hypertension     Dr. Simone Lion    Hypothyroidism     Lumbar neuritis     Post laminectomy syndrome     Spinal deformity 11/2021    cervical and lumbar    Thyroid disease     Uses wheelchair     Wears dentures Wellness examination     Dr. Lisa Lundberg      Past Surgical History:   Procedure Laterality Date    BACK SURGERY      lower back x 3, cervical- x 1: C4- C6, 2014     BREAST SURGERY Right     mastectomy with reconstruction    CERVICAL FUSION N/A 2022    C5 CORPECTOMY, USE OF INTRAOPERATIVE CERVICAL TRACTION performed by Cheryl Webb DO at [facility] N/A 2022    POSTERIOR FIXATION C2-C7 performed by Cheryl Webb DO at [address]  about 2018    HERNIA REPAIR Bilateral     inguinal    HYSTERECTOMY, TOTAL ABDOMINAL (CERVIX REMOVED)      LUMBAR SPINE SURGERY N/A 2021    LUMBAR LAMINECTOMY L2-S1 performed by Cheryl Webb DO at [address] [facility] Maile Petit, RADICAL      OTHER SURGICAL HISTORY  2022    C5 CORPECTOMY, USE OF INTRAOPERATIVE CERVICAL TRACTION (N/A Spine Cervical)      Family History   Problem Relation Age of Onset    Hypertension Mother     Heart Disease Mother     Cancer Mother      Social History     Socioeconomic History    Marital status: Single   Tobacco Use    Smoking status: Former     Packs/day: 0.50     Years: 30.00     Pack years: 15.00     Types: Cigarettes     Quit date:      Years since quittin.6    Smokeless tobacco: Never   Vaping Use    Vaping Use: Every day    Substances: Nicotine   Substance and Sexual Activity    Alcohol use: Yes     Alcohol/week: 0.0 standard drinks     Comment: weekend at times    Drug use: Yes     Types: Prescription     Comment: Methadone from [facility]     Social Determinants of Health     Financial Resource Strain: Low Risk     Difficulty of Paying Living Expenses: Not very hard   Food Insecurity: No Food Insecurity    Worried About Running Out of Food in the Last Year: Never true    Ran Out of Food in the Last Year: Never true   Transportation Needs: No Transportation Needs    Lack of Transportation (Medical): No    Lack of Transportation (Non-Medical):  No   Physical Activity: Inactive    Days of Exercise per Week: 0 days    Minutes of Exercise per Session: 0 min   Stress: No Stress Concern Present    Feeling of Stress : Only a little   Social Connections: Socially Isolated    Frequency of Communication with Friends and Family: Three times a week    Frequency of Social Gatherings with Friends and Family: Twice a week    Attends Mandaeism Services: Never    Active Member of Clubs or Organizations: No    Attends Club or Organization Meetings: Never    Marital Status:    Housing Stability: [address] to Pay for Housing in the Last Year: No    Number of [place] in the Last Year: 1    Unstable Housing in the Last Year: No       Current Outpatient Medications   Medication Sig Dispense Refill    Handicap Placard MISC by Does not apply route Cannot walk 200 feet due to medical issues  Expires 7/14/2027 1 each 0    busPIRone (BUSPAR) 30 MG tablet TAKE ONE TABLET BY MOUTH TWICE A DAY 60 tablet 3    HYDROcodone-acetaminophen (NORCO)  MG per tablet Take 1 tablet by mouth every 6 hours as needed for Pain.      dilTIAZem (CARDIZEM CD) 180 MG extended release capsule Take 1 capsule by mouth daily 30 capsule 3    docusate sodium (COLACE) 100 MG capsule Take 2 capsules by mouth 2 times daily 60 capsule 1    baclofen (LIORESAL) 10 MG tablet Take 1 tablet by mouth 4 times daily 120 tablet 1    pantoprazole (PROTONIX) 40 MG tablet Take 1 tablet by mouth daily 30 tablet 3    methadone (DOLOPHINE) 10 MG tablet Take 10 mg by mouth 2 times daily.       levothyroxine (SYNTHROID) 100 MCG tablet Take 1 tablet by mouth Daily 30 tablet 3    lisinopril (PRINIVIL;ZESTRIL) 5 MG tablet Take 1 tablet by mouth daily 30 tablet 3    fluticasone (FLONASE) 50 MCG/ACT nasal spray 1 spray by Each Nostril route daily (Patient not taking: Reported on 8/17/2022) 16 g 3    ferrous sulfate (IRON 325) 325 (65 Fe) MG tablet Take 1 tablet by mouth 2 times daily (with meals) (Patient not taking: No sig reported) 30 tablet 3    Vitamin D, Ergocalciferol, 50 MCG (2000 UT) CAPS TAKE ONE CAPSULE BY MOUTH DAILY (Patient not taking: Reported on 8/17/2022) 90 capsule 3     No current facility-administered medications for this visit. Allergies   Allergen Reactions    Latex Hives, Itching, Dermatitis and Rash    Kiwi Extract      Face swelling, some difficulty breathing       Subjective:      Review of Systems  Constitutional: Negative for fever, chills and unexpected weight change. HENT: Negative for trouble swallowing. Respiratory: Negative for cough and shortness of breath. Cardiovascular: Negative for chest pain. Endocrine: Negative for polyuria. Genitourinary: Negative for dysuria, urgency, frequency, incontinence and difficulty urinating. Gastrointestinal: Negative for constipation or diarrhea - controlled with medications. Musculoskeletal: Negative for arthralgias. Neurological: Negative for headaches. Positive for numbness, tingling. Psychiatric: Negative for depressed mood or anxiety. Objective:     Physical Exam  /80   Pulse 94   Temp 97.5 °F (36.4 °C)   Ht 5' 1\" (1.549 m)   Wt 135 lb 3.2 oz (61.3 kg)   BMI 25.55 kg/m²   Constitutional: She appears well-developed and well-nourished. In no distress. HEENT: NCAT, PERRL, EOMI. Mucous membranes pink and moist.  Pulmonary/Chest: Respirations WNL and unlabored. MSK: Functional ROM BUE and BLEs with some improved ROM cervical spine. Strength 4+/5 key muscles BUEs and RLE. L dorsiflexion and EHL 4/5. Neurological: She is alert and oriented to person, place, and time. DTRs 2+ and symmetric. Negative Hoffmans bilaterally  Skin: Skin is warm dry and intact with good turgor. Psychiatric: She has a normal mood and affect. Her behavior is normal. Thought content normal.   Medical assistant note and vitals for today's encounter reviewed.     Diagnostic Studies:     CT CERVICAL SPINE 6/20/22  FINDINGS:   BONES/ALIGNMENT: Patient is status post fusion with ACDF hardware placement   C4 through C7. Posterior stabilization hardware is present C2 through C7. Vertebral bodies are well maintained in height without evidence of acute   fracture. However, the odontoid process is located anteriorly with the   dorsal edge of the odontoid even with the clivus. DEGENERATIVE CHANGES: Multilevel degenerative changes are present. Diffuse   mild neural foraminal narrowing is present. SOFT TISSUES: There is no prevertebral soft tissue swelling. Bronchiectasis   and atelectasis right upper lobe medially is noted. Atherosclerotic   calcification of the aortic arch is present. Impression   Postsurgical changes. Ventral subluxation of the odontoid in relation to the   clivus. Multilevel degenerative and degenerative disc changes. CT THORACIC SPINE 6/20/22  FINDINGS:   BONES/ALIGNMENT: No acute fracture are traumatic malalignment is noted. Fixative posterior hardware is present on the edge of the film in the lower   thoracic spine. Accentuated kyphotic curvature from T2 to the bottom of T9   of 47 degrees is noted. Structures are osteopenic. DEGENERATIVE CHANGES: Diffuse mild degenerative changes and moderate   degenerative disc changes are present throughout the thoracic spine. Neural   foramina are fairly patent. Moderate dextroscoliotic curvature of 12 degrees   is noted from the top of T5 to the bottom of T9. No gross bony canal   stenosis is evident. SOFT TISSUES: No paraspinal mass is seen. Pulmonary right upper lobe   bronchiectasis is noted. Impression   Osteopenia with dextro kyphoscoliotic curvature of the thoracic spine without   acute fracture or traumatic malalignment. Vertebral bodies are well   maintained in height. Assessment:       Diagnosis Orders   1. Cervical myelopathy [facility])  [facility] Occupational Therapy  Evaluation and Training - [place]      2.  Spinal cord injury, cervical region, sequela ([facility] [address]  Evaluation and Training [facility]           Plan: To continue with outpatient therapies. Will order drivers eval and training. Orders Placed This Encounter   Procedures    [facility]  Evaluation and Training - [place]     Referral Priority:   Routine     Referral Type:   Occupational Therapy     Referral Reason:   Specialty Services Required     Requested Specialty:   Occupational Therapy     Number of Visits Requested:   1     No orders of the defined types were placed in this encounter. Return in about 3 months (around 11/17/2022). Electronically signedby Ca Moffett MD on 8/31/2022 at 2:06 PM.     Please note that this chartwas generated using voice recognition Dragon dictation software. Although everyeffort was made to ensure the accuracy of this automated transcription, some errorsin transcription may have occurred.

## 2022-08-19 ENCOUNTER — HOSPITAL ENCOUNTER (OUTPATIENT)
Dept: PHYSICAL THERAPY | Age: 62
Setting detail: THERAPIES SERIES
Discharge: HOME | End: 2022-08-19
Payer: MEDICARE

## 2022-08-19 ENCOUNTER — HOSPITAL ENCOUNTER (OUTPATIENT)
Dept: OCCUPATIONAL THERAPY | Age: 62
Setting detail: THERAPIES SERIES
Discharge: HOME | End: 2022-08-19
Payer: MEDICARE

## 2022-08-19 NOTE — PROGRESS NOTES
[facility]    OCCUPATIONAL THERAPY MISSED TREATMENT NOTE   OUTPATIENT   Date: 22  Patient Name: Carmen Howe       MRN: [id number]   Account #: [de-identified]    : 1960  (65 y.o.)  Gender: female   Diagnosis: spinal cord injury , cervical region sequela (S14.109S), incomplete quadriplegia at C5-6 level (G82.54), cauda equina syndrome ( G83.4) - ICD-10 codes  Dr Caleb Mark  OT Visit Information  OT Insurance Information: Medicare  Canceled Appointment: 2    REASON FOR MISSED TREATMENT:     -    Pt called and cancel therapy today.          Markel Ziegler, OT

## 2022-08-19 NOTE — FLOWSHEET NOTE
[] [facility]) - [facility] & Therapy  [address]  [place]: [phone]   F: [phone]     Physical Therapy Cancel/No Show note    Date: 2022  Patient: Almaz Bermudez  : 1960  MRN: [id number]    Visit Count:   Cancels/No Shows to date:     For today's appointment patient:    [x]  Cancelled    [] Rescheduled appointment    [] No-show     Reason given by patient:    []  Patient ill    []  Conflicting appointment    [] No transportation      [] Conflict with work    [x] No reason given    [] Weather related    [] [id number]    [] Other:      Comments:        [x] Next appointment was confirmed    Electronically signed by: Shana Crowder, PTA

## 2022-08-22 RX ORDER — LISINOPRIL 5 MG/1
TABLET ORAL
Qty: 30 TABLET | Refills: 3 | OUTPATIENT
Start: 2022-08-22

## 2022-08-22 RX ORDER — LEVOTHYROXINE SODIUM 0.1 MG/1
TABLET ORAL
Qty: 30 TABLET | Refills: 3 | OUTPATIENT
Start: 2022-08-22

## 2022-08-24 ENCOUNTER — HOSPITAL ENCOUNTER (OUTPATIENT)
Dept: OCCUPATIONAL THERAPY | Age: 62
Setting detail: THERAPIES SERIES
Discharge: HOME | End: 2022-08-24
Payer: MEDICARE

## 2022-08-24 ENCOUNTER — HOSPITAL ENCOUNTER (OUTPATIENT)
Dept: PHYSICAL THERAPY | Age: 62
Setting detail: THERAPIES SERIES
Discharge: HOME | End: 2022-08-24
Payer: MEDICARE

## 2022-08-24 PROCEDURE — 97110 THERAPEUTIC EXERCISES: CPT

## 2022-08-24 PROCEDURE — 97112 NEUROMUSCULAR REEDUCATION: CPT

## 2022-08-24 RX ORDER — DILTIAZEM HYDROCHLORIDE 180 MG/1
CAPSULE, COATED, EXTENDED RELEASE ORAL
Qty: 30 CAPSULE | Refills: 3 | OUTPATIENT
Start: 2022-08-24

## 2022-08-24 ASSESSMENT — PAIN DESCRIPTION - PAIN TYPE: TYPE: ACUTE PAIN

## 2022-08-24 ASSESSMENT — PAIN DESCRIPTION - DESCRIPTORS: DESCRIPTORS: TINGLING;TIGHTNESS

## 2022-08-24 ASSESSMENT — PAIN DESCRIPTION - LOCATION: LOCATION: BACK;ARM;NECK;LEG

## 2022-08-24 ASSESSMENT — PAIN SCALES - GENERAL: PAINLEVEL_OUTOF10: 5

## 2022-08-24 ASSESSMENT — PAIN DESCRIPTION - FREQUENCY: FREQUENCY: CONTINUOUS

## 2022-08-24 ASSESSMENT — PAIN DESCRIPTION - ORIENTATION: ORIENTATION: LEFT

## 2022-08-24 NOTE — PROGRESS NOTES
[facility] [address] [place] [address]. [address]         Phone: [phone]       Fax: [phone]     Occupational Therapy Daily Treatment note     Occupational Therapy: Daily Note   Patient: Sammi Méndez (75 y.o. female)   Examination Date: 2022  No data recorded  Progress Note Counter: Next MD appt 2022     :  1960 # of Visits since [facility]:   27   MRN: [id number]  CSN: [id number] Start of Care Date:05/10/2022   Insurance: Payor: MEDICARE / Plan: MEDICARE PART A AND B / Product Type: *No Product type* /   Insurance ID: [id number] - (Medicare) Secondary Insurance (if applicable): Insurance Information: Medicare   Referring Physician: MD Dr Epifanio Ingram Sender   PCP: Francisco Suero MD Visits to Date/Visits Approved:     No Show/Cancelled Appts:   /       Medical Diagnosis: Unspecified injury at unspecified level of cervical spinal cord, sequela [S14.109S]  Quadriplegia, C5-C7 incomplete [G82.54]  Cauda equina syndrome [G83.4] spinal cord injury , cervical region sequela (S14.109S), incomplete quadriplegia at C5-6 level (G82.54), cauda equina syndrome ( G83.4) - ICD-10 codes  Treatment Diagnosis: bilateral UE weakness & impaired coordination, bilateral shoulder stiffness, impaired sensation - hands lt more than rt  (M62.81, R27.9, M25.611, M25.612,R20.2        SUBJECTIVE EXAMINATION   Pain Level: Pain Screening  Patient Currently in Pain: Yes  Pain Assessment: 0-10  Pain Level: 5  Post Treatment Pain Level: 5  Patient's Stated Pain Goal: 0 - No pain  Pain Type: Acute pain  Pain Location: Back, Arm, Neck, Leg  Pain Orientation: Left  Pain Descriptors: Tingling, Tightness (Tingling in both hands.)  Pain Frequency: Continuous    Patient Comments: Subjective: Pt states her lt hand coordination is getting better. Pt states at her MD appt the doctor talked about  rehab.  OT told pt that Vincentla German OT does the  evals. Pt has an order in the system.  will set up an appt for pt. HEP Compliance: Good        OBJECTIVE EXAMINATION   Restrictions: Restrictions/Precautions: Fall Risk;    TREATMENT     Exercises:     Treatment Reasoning    OT Exercise 1: PROM B UE,shoulders, wrist/hand  OT Exercise 2: 3# theracane UE exercises 15x each way ( shoulder circles cw/ccw, shoulder horizontal abduct/adduction, scapula pro/retraction with elbow flex/extension)  OT Exercise 3: red 10# theraband flexbar 20x each way using bilateral hand: twist, make U, make upside down U, make C, make backward C, hold flexbar vertical at bottom turn cw 10x /ccw 10x each way (rt hand, lt hand),  OT Exercise 4: Red 3# digiflex 25x gripping bilateral hands,red 3# digiflex each finger oppositional pinch 10x, B hands - lay digiflex on table to do little finger pinching  OT Exercise 5: valpar 4 nuts/bolts  - using rt hand unscrew/screw 4 ( 1\" diameter nuts) on/off bolts, using lt hand unscrew/screw 4 (1\" diameter nuts ) on/off bolts. OT Exercise 8: velcro board, remove 32 pieces  using left hand  and place to right then place back on board, remove 32 pieces using  right hand  and place to left then place back on board  OT Exercise 9: guillermo tweezer dexterity - using tweezer  with lt hand  place 20 thin metal pieces in board,  & then remove using tweezer, using tweezer with rt hand place 20 thin metal pieces in board, & then remove using tweezer. Limitations addressed: Strength, Coordination, Flexibility (ROM)  Therapist provided: Verbal cuing  Progressed: Repetitions  Progressed: Increase reps (pieces manipulated using lt hand) with guillermo tweezer dexterity exercise. ASSESSMENT     Assessment: Assessment: OT tx includes bilateral shoulder/wrist/hand PROM. No tightness felt in pt's shoulders. Pt participates in bilateral UE & hand strengthening & hand manual/fine motor dexterity exercises. Verbal cues as needed for UE exercises. Pt continues with difficulty doing bilateral little finger oppositional pinch using digiflex. Pt completes bilateral fine motor exercise manipulating thin metal pins using tweezers more difficulty (taking longer time) using lt hand compared to rt hand. No increase in UE pain during tx. See OT exercises for details.   Performance deficits / Impairments: Decreased ROM, Decreased strength, Decreased coordination, Decreased fine motor control, Decreased high-level IADLs, Decreased sensation    Post-Treatment Pain Level: 5    Prognosis: Good    Activity Tolerance: Patient tolerated treatment well             TREATMENT PLAN   REQUIRES OT FOLLOW-UP: Yes  Type: Outpatient  Plan  Plan Frequency: 2x/week  Plan Weeks: 20 visits+ 10 more visits after 07/027/2022  Current Treatment Recommendations: Strengthening, ROM, Patient/Caregiver education & training, Coordination training, Positioning  Plan Comment: continue OT       Therapy Time  Individual Time In: 1448  Individual Time Out: 8064  Minutes: 50  Timed Code Treatment Minutes: 48 Minutes       Electronically signed by Markel Ziegler OT  on 8/24/2022 at 4:08 PM       Treatment Charges: Mins Units Time In/Out   [] Evaluation       []  Low       []  Moderate       []  High      []  Electrical Stim      []  Iontophoresis      []  Paraffin      []  Ultrasound        []  Masage      []  Neuromuscular Re-ed      []  ADL      [x]  Ther Exercise 48 3 14:50-15:38   []  Ther Activities      []  Neuro Re-Ed      []  Splinting      []  Other      Total Treatment time 50 min           POC NOTE

## 2022-08-24 NOTE — FLOWSHEET NOTE
[facility]   [address]   Phone: [phone]   Fax: [phone]    Physical Therapy Daily Treatment Note/Progress Note       Date:  2022  Patient Name:  Rene Gomez    :  1960  MRN: [id number]  Physician: Uriel Barber MD                            Insurance: Medicare - based on [place] -- [facility] Diagnosis:   G38.289A (ICD-10-CM) - Spinal cord injury, cervical region, sequela ([facility]   G82.54 (ICD-10-CM) - Incomplete quadriplegia at C5-6 level ([facility]   G83.4 (ICD-10-CM) - Cauda equina syndrome ([facility]   Rehab Codes: R25 pain , R53.1 weakness, Z74.0 reduced mobility, R29.6 high fall risk, R27.8 lack of coordination, R26.89 gait abnormality   Date of symptom onset: 22 -- cauda equina; 22 Cervical surgeries   Next 's appt.: 10/12/22 with Neurosurgery; 22 with PMR Dr. Sukhwinder Maria   Total Visits:   Cx/Ns:1/0     Precautions: Can now wean out of cervical collar; not lifting greater than a gallon of milk:       Subjective:  Patient arriving to therapy with FWW with no new complaints. Pain:  [x] Yes  [] No Location: Neck down to (B) Shoulders/UEs to knuckles in hands (B) LE's int feet and toes    Pain Rating: (0-10 scale) 5/10  Pain altered Tx:  [x] No  [] Yes  Action:  Comments:       Objective:   Exercises:  Exercise Reps/ Time Weight/ Level Completed  Today Comments   nustep  5' Level 5            Cervical stretching        Upper trap stretch  2x30s ea    Therapist over pressure    Levator scap stretch  2x30s ea    Therapist overpressure    Rotation stretch  2x30s ea    Therapist overpressure    Doorway pec stretch  2x30s             Postural strengthening     Half roll behind in sitting for posture promotion.     Seated rows  2x15 green  X    Seated horizontal ABD  2x15 3\" hold green X    Seated B ER  2x15  green X With scap squeeze at end   Standing resisted rotations  2x10 ea  green  Good challenge for balance           MAT LEVEL     Elevated with large wedge and 2 pillows    4 way ankle 2x15e  yellow X           Standing       Resisted STS    2x10   Green   From mat table    Sit to stand without hands  2x10   CGA to Miryam.  Cues for momentum and foot placement     Step ups Fwd/Lat 10x2 6\", 2# X unilateral UE support    3 way hip w/ eyes closed  10x ea  2 sets 2# aw X With BUE support, eyes closed to build proprioceptive input     Slow Controlled Marches 2-3\" to raise and lower 10x  2 sets  No UE support   CGA    Forced WB on 4\" step + 1/2 ball 2x30\" each   Encouraged light touch; WB L LE more difficult than R LE   Slow high march w/ opposite UE raise 10x2 ea 3# L LE  8/11: used 2.5# as 3# N/A   Step over basil 10x2 2# X CGA One UE support   Heel/toe raises  20x   SPC for support   Semi tandem Stance  3x30\"   NO UE Support, CGA    EC Foam feet Normal Stance 2x30\"    NO UE Support CGA   EO/ EC Foam Feet touching 3x30''   NO UE support  Alternated EO/EC   Standing on foam with press forward & press up  2x10ea 4# ball      Feet together Eye Closed Firm surface 2x30\"    NO UE Support- cues at hips as weight is shifted over LLE     Toe taps 6\" step + 1/2 ball 10x2 ea 2#  Unilateral UE support    Basil step overs w/ dual tasking  2x15 6 in basil  UE support with quad cane and pt naming nominal categories for dual tasking challenge    Resisted side stepping  2x10 ea yellow  HHA with therapist    Step up to foam w/ SLS 10x5'' ea   SPC for support   Sliders - fwd, lateral, bkwd  X10 ea  Quad cane X Minimal ROM demonstrated in all directions          GAIT TRAINING        Ambulation with SBQC  x75ft   SBA -- cues for heel contact to get larger step lengths    Ambulation with SPC  120ft 2.5#  CGA     Weaving through cones  X6 passes    CGA -- SBA    Walking with LLE taps to cones with SPC X4 passes  5x cones   CGA -- has x1 LOB needing modA to recover d/t stepping her L foot on her R foot    Walking with RLE taps to cones with SPC  X4 passes  5x cones  CGA -- has a few stumbles but able to self recover   Straight leg kicks with SPC 2x30ft    CGA    Backwards walking with SPC  2x25ft   CGA    Walking with horizontal head turns  2x20ft    Moderate instability    Walking with vertical head turns  2x20ft    Moderate instability    Marching with SPC 2x30ft   CGA   Walking uneven surfaces with SPC 3x20ft   2 cobble foam and long foam put together; CGA. 1xminA for LOB on cobble foam   Curb steps with SBQC  x8 4\"  CGA to Miryam -- trialed with ea leg leading ascending and descending    ambulation with quad cane and ankle wt  x150ft 3# aw  X CGA-- focusing on larger step lengths -- no ankle weights 8/24                 Other:   8/16: educated on the return to driving process and that she needs to talk with MD about this at next visit. Discussed from a PT stand point she has intact R side that would be sufficient for driving. Would want to get OT opinion on L hand use for turn signal. Another limiting factor is cervical ROM but feel this is improving and she would be ready for driving assessment. 7/29: 5' spent  on educating on the brace and how to don. Then ambulated x90ft with SPC to ensure straps donned appropriately. Specific Instructions for next treatment: Work on L stance control and weight translation to increase R step lengths, work on obstacle course with SPC, work on endurance     Assessment:    [x] Progressing toward goals. Began session with postural strengthening and followed with BLE strengthening and balance. Focused on increasing strength on LLE to improve ability to get her leg into the tub safely at home. Patient continues to require seated rest breaks due to fatigue. Frequent cues needed to correct patient's posture in seated and standing. Educated patient on self-pacing during activities to increase safety as she tends to fatigue easily.   Patient continues to be CGA for standing exercises with patient utilizing unilateral UE support. [] No change. [] Other:    [x] Patient would continue to benefit from skilled physical therapy services in order to: improve strength,mobiltiy and motor control that impacts balance and mobility. GOALS -- updated 8/16/22  Goals to Continue for POC:   Pt will demonstrate >10 SLS stability with light UE support to safely allow pt to enter/exit her tub shower. - 6/15: partially met -- light UE support for RLE, still difficulty on the L        - 7/21: progressing -- minUE support for stability on LLE    - 8/16: progressing -- minUE support still on LLE but able to go for longer time (15s)          3. Pt will improve time on TUG to <20s with LRAD at mod IND level to demonstrate decreased fall risk. - 6/15: progressing -- 10 second improvement to 25.5s         - 7/21: MET with 2WW reaching 15.7s; will continue goal to see if she can achieve with cane         - 8/16: progressing with SPC - 23.5 sec this date   Pt will increase gait speed to >0.8 m/s  With LRAD at mod I level to demonstrate decreased fall risk and optimize mobility to at community level. - 6/15: limited increase in speed but pt is now mod IND with 2WW        - 7/21: progressing-- significant improvement in gait speed with 2WW to 0.67m/s & 0.46m/s with [address]    -8/16: progressing with use of SPC -- will assess formally in upcoming sessions. Pt will improve score on ESCOBEDO balance scale by >/= 45/56 points  to improve overall balance stability and decrease fall risk with mobility. - 6/15: progressing -- 33/56          - 7/21: progressing -- improves score ot 38/56   -8/16: progressing -- 44/56 greater improvement    Pt will increase strength of all major muscle groups of the LEs to 4/5 or greater demonstrating improved strength needed to maximize safety and efficiency with mobility tasks such as gait, transfers and stairs.   - 6/15: progressing  -7/21; Progressing -- great progress made with LE strength, especially L ankle strength now all being above 3/5  - 8/16: progressing -- almost met but still working on L ankle eversion strength          3. Pt will be independent with home program addressing strength, flexibility and balance to maximize gains made in therapy to improve overall functional capacity for mobility.     -6/15: progressing -- will continue to update in POC  -7/21: progressing -- will continue to update in POC  -8/16: progressing -- will continue to update in POC, notes good compliance           4. Pt will improve score on OPTIMAL to 38/90 to demonstrate functional improvements with ADLs. 6. NEW GOAL 7/12: Pt will improve AROM in of cervical ROM to >60 bilaterally to improve ability to visually scan while driving.              -7/21; Progressing    -8/16: progressing -- 52 deg to R this date; 48 deg to L this date   8. NEW GOAL 7/12: Pt will demonstrate improved postural awareness with only minimal cues to correct posture throughout session to prevent undue stress to spine.               -7/21; Progressing    -8/16: progressing   9. New goal 8/16/22: Pt will be able to navigate curbs, uneven surfaces, and inclines/declines with [facility] with distant supervision to ensure She can safely navigate the community & get outdoors more. 10. New goal 8/16/22: Pt will be able to ambulate >500ft with 6mWT with SPC to demonstrate improved endurance and efficiency needed for community ambulation. Goals MET in POC:    Pt will improve score on ESCOBEDO balance scale by >/=6 points to improve overall balance stability and decrease fall risk with mobility. - 6/15: MET -- exceeds  Pt will be able to manage her walker up 1 step without instability to improve independence with access to her home. - 6/15: progressing   - 7/21: MET    Pt will improve time on 5xSTS to <15 seconds with use of 1 UE to decrease fall risk and increase functional capacity for mobility.   - 6/15: completes with unilateral UE support this date but time is increased. - 7/21: progressing -- reaches 15.09s with 1 UE support   -8/16: MET -- 15s this date   Pt will improve score on OPTIMAL to 46/90 to demonstrate functional improvements with ADLs.   -6/15: will assess at a later time    - 7/21: will assess next session   - 8/16: MET 43/90 this date   Pt will report no falls for x6 weeks demonstrating improved safety with mobility and implementation of fall prevention strategies. - 6/15: meeting currently with no falls noted  - 7/21; MET -- no falls noted, verbalizes awareness of strategies to decrease falls   7. NEW GOAL 7/12: Pt will be able to maintain at least a neutral cervical alignment for > 75% of session showing improved cervical strength for keeping her head up to visualize her environment.              -7/21: progressing    -8/16: MET -- good alignment for all of session   8. NEW GOAL 7/12: Pt will demonstrate improved postural awareness with only minimal cues to correct posture throughout session to prevent undue stress to spine.               -7/21; Progressing         Patient stated Goals: to walk without a walker, improve balance     Pt. Education:  [x] Yes  [] No  [x] Reviewed Prior HEP/Ed  Method of Education: [x] Verbal  [x] Demo  [] Written  Comprehension of Education:  [x] Verbalizes understanding. [] Demonstrates understanding. [] Needs review. [] Demonstrates/verbalizes HEP/Ed previously given. PLAN:    [x] Continue per POC               [] Other:     Patient Status:     [x] Continue per updated plan of care. [] Additional visits necessary.      [] Other:             Treatment Charges: Mins Units   []  Modalities     []  Ther Exercise     []  Manual Therapy     []  Ther Activities     []  Aquatics     [x]  Neuromuscular 57 4   [] Vasocompression     [] Gait Training     [] Dry needling        [] 1 or 2 muscles        [] 3 or more muscles     []  Other     Total Treatment time 62 4   * KX modifier needed*     Time In: 1345      Time Out:  [address]      Electronically signed by:  Clayton Finn, PTA

## 2022-08-26 ENCOUNTER — HOSPITAL ENCOUNTER (OUTPATIENT)
Dept: OCCUPATIONAL THERAPY | Age: 62
Setting detail: THERAPIES SERIES
Discharge: HOME | End: 2022-08-26
Payer: MEDICARE

## 2022-08-26 ENCOUNTER — HOSPITAL ENCOUNTER (OUTPATIENT)
Dept: PHYSICAL THERAPY | Age: 62
Setting detail: THERAPIES SERIES
Discharge: HOME | End: 2022-08-26
Payer: MEDICARE

## 2022-08-26 PROCEDURE — 97110 THERAPEUTIC EXERCISES: CPT

## 2022-08-26 PROCEDURE — 97112 NEUROMUSCULAR REEDUCATION: CPT

## 2022-08-26 ASSESSMENT — PAIN DESCRIPTION - DESCRIPTORS: DESCRIPTORS: TIGHTNESS;TINGLING

## 2022-08-26 ASSESSMENT — PAIN SCALES - GENERAL: PAINLEVEL_OUTOF10: 5

## 2022-08-26 ASSESSMENT — PAIN DESCRIPTION - PAIN TYPE: TYPE: ACUTE PAIN

## 2022-08-26 ASSESSMENT — PAIN DESCRIPTION - FREQUENCY: FREQUENCY: CONTINUOUS

## 2022-08-26 ASSESSMENT — PAIN DESCRIPTION - ORIENTATION: ORIENTATION: LEFT

## 2022-08-26 ASSESSMENT — PAIN DESCRIPTION - LOCATION: LOCATION: ARM;LEG;NECK

## 2022-08-26 NOTE — PROGRESS NOTES
neck pain isn't bad today. HEP Compliance: Good        OBJECTIVE EXAMINATION   Restrictions: Restrictions/Precautions: Fall Risk;    TREATMENT     Exercises:     Treatment Reasoning    OT Exercise 1: PROM B UE,shoulders, wrist/hand, manual resistance bilateral hand/wrist 25x each way (flexion,extension)  OT Exercise 3: red 10# theraband flexbar 20x each way using bilateral hand: twist, make U, make upside down U, make C, make backward C, hold flexbar vertical at bottom turn cw 10x /ccw 10x each way (rt hand, lt hand),  OT Exercise 9: guillermo tweezer dexterity - using tweezer  with lt hand  place 20 thin metal pieces in board,  & then remove using tweezer, using tweezer with rt hand place 20 thin metal pieces in board, & then remove using tweezer. OT Exercise 10: BTE #504 B wrists for flexion/extension, T = 8  lbs. , 120 seconds (rt hand, lt hand)  OT Exercise 11: BTE #162 hand gripper, T = 30in lbs, 75 seconds(rt hand/lt hand);  pinch , T = 25 in lbs, 85 seconds. (rt hand/lt hand)  OT Exercise 18: BTE tool 122 ergonometer T =5 lb (80 seconds forward, 80 seconds backward)  OT Exercise 19: BTE tool 802  bilateral UE push /pull T = 8 lb for 120 seconds  OT Exercise 20: BTE tool 302 flat knob (RD/UD) to simulate open jar/bottle : T = 6 inlb  (rt hand 90 seconds, lt hand  90 seconds) - dycem used    Limitations addressed: Strength, Coordination, Flexibility  Therapist provided: Verbal cuing  Progressed: Repetitions  Progressed: Increase time BTE tool 302. ASSESSMENT     Assessment: Assessment: Tightness only felt in lt shoulder flexion during PROM bilateral shoulder,wrists, & hands. Pt completes bilateral UE/hand strengthening using different tools on BTE with set up. Pt takes increase time with fine motor exercise manipulating thin metal pins using tweezers rt/lt hand (guillermo tweezer dexterity exercise) See OT exercises for details.   Performance deficits / Impairments: Decreased ROM, Decreased strength, Decreased coordination, Decreased fine motor control, Decreased high-level IADLs, Decreased sensation    Post-Treatment Pain Level: 5    Prognosis: Good    Activity Tolerance: Patient tolerated treatment well               TREATMENT PLAN   REQUIRES OT FOLLOW-UP: Yes  Type: Outpatient  Plan  Plan Frequency: 2x/week  Plan Weeks: 20 visits+ 10 more visits after 07/027/2022  Current Treatment Recommendations: Strengthening, ROM, Patient/Caregiver education & training, Coordination training, Positioning  Plan Comment: continue OT       Therapy Time  Individual Time In: 1257  Individual Time Out: 0121  Minutes: 46  Timed Code Treatment Minutes: 46 Minutes       Electronically signed by Manuel Garcia OT  on 8/26/2022 at 1:45 PM       Treatment Charges: Mins Units Time In/Out   [] Evaluation       []  Low       []  Moderate       []  High      []  Electrical Stim      []  Iontophoresis      []  Paraffin      []  Ultrasound        []  Masage      []  Neuromuscular Re-ed      []  ADL      [x]  Ther Exercise 46 3    []  Ther Activities      []  Neuro Re-Ed      []  Splinting      []  Other      Total Treatment time 46 min 3          POC NOTE

## 2022-08-26 NOTE — FLOWSHEET NOTE
[facility]   [address]   Phone: [phone]   Fax: [phone]    Physical Therapy Daily Treatment Note/Progress Note       Date:  2022  Patient Name:  Edwin Bravo    :  1960  MRN: [id number]  Physician: Scotty Anguiano MD                            Insurance: Medicare - based on [place] -- [facility] Diagnosis:   W52.144Y (ICD-10-CM) - Spinal cord injury, cervical region, sequela ([facility] [address]   G82.54 (ICD-10-CM) - Incomplete quadriplegia at C5-6 level ([facility] [address]   G83.4 (ICD-10-CM) - Cauda equina syndrome ([facility] [address]   Rehab Codes: R25 pain , R53.1 weakness, Z74.0 reduced mobility, R29.6 high fall risk, R27.8 lack of coordination, R26.89 gait abnormality   Date of symptom onset: 21 -- cauda equina; 22 Cervical surgeries   Next 's appt.: 10/12/22 with Neurosurgery; 22 with PMR Dr. Amarjit Swann   Total Visits:   Cx/Ns:1/0     Precautions: Can now wean out of cervical collar; not lifting greater than a gallon of milk:       Subjective:  Patient arrives from OT with 2ww and states she is feeling as she usually does, with no changes since last session. Pain:  [x] Yes  [] No Location: Neck down to (B) Shoulders/UEs to knuckles in hands (B) LE's int feet and toes    Pain Rating: (0-10 scale) 5/10  Pain altered Tx:  [x] No  [] Yes  Action:  Comments:       Objective:   Exercises:  Exercise Reps/ Time Weight/ Level Completed  Today Comments   nustep  5' Level 5            Cervical stretching        Upper trap stretch  2x30s ea    Therapist over pressure    Levator scap stretch  2x30s ea    Therapist overpressure    Rotation stretch  2x30s ea    Therapist overpressure    Doorway pec stretch  2x30s             Postural strengthening     Half roll behind in sitting for posture promotion.     Seated rows  2x15 green  x    Seated horizontal ABD  2x15 3\" hold green     Seated B ER  2x15  green  With scap squeeze at end   Standing resisted rotations  2x10 ea  green  Good challenge for balance           MAT LEVEL     Elevated with large wedge and 2 pillows    4 way ankle 2x15e  yellow            Standing       Resisted STS    2x10   Green   From mat table    Sit to stand without hands  2x10   CGA to Miryam.  Cues for momentum and foot placement     Step ups Fwd/Lat 10x2 6\", 2# x No UE support with step up, unilateral support on step down   3 way hip w/ eyes closed  10x ea  2 sets 2# aw  With BUE support, eyes closed to build proprioceptive input     Slow Controlled Marches 2-3\" to raise and lower 10x  2 sets  No UE support  x CGA    Forced WB on 4\" step + 1/2 ball 2x30\" each   Encouraged light touch; WB L LE more difficult than R LE   Slow high march w/ opposite UE raise 10x2 ea 3# L LE  8/11: used 2.5# as 3# N/A   Step over basil 10x2 2#  CGA One UE support   Heel/toe raises  20x   SPC for support   Semi tandem Stance  3x30\"   NO UE Support, CGA    EC Foam feet Normal Stance 2x30\"    NO UE Support CGA   EO/ EC Foam Feet touching 3x30''   NO UE support  Alternated EO/EC   Standing on foam with press forward & press up  2x10ea 4# ball      Feet together Eye Closed Firm surface 2x30\"    NO UE Support- cues at hips as weight is shifted over LLE     Toe taps 6\" step + 1/2 ball 10x2 ea 2#  Unilateral UE support    Basil step overs w/ dual tasking  2x15 6 in basil  UE support with quad cane and pt naming nominal categories for dual tasking challenge    Resisted side stepping  2x10 ea yellow  HHA with therapist    Step up to foam w/ SLS 10x5'' ea   SPC for support   Sliders - fwd, lateral, bkwd  X10 ea  Quad cane  Minimal ROM demonstrated in all directions   Heel taps to step 2x10 ea 6'' x To improve DF during gait; Cued to not extend knee          GAIT TRAINING        Ambulation with SBQC  x75ft   SBA -- cues for heel contact to get larger step lengths    Ambulation with SPC  120ft 2.5#  CGA     Weaving through cones  X6 passes    CGA -- SBA Walking with LLE taps to cones with SPC X4 passes  5x cones   CGA -- has x1 LOB needing modA to recover d/t stepping her L foot on her R foot    Walking with RLE taps to cones with SPC  X4 passes  5x cones  CGA -- has a few stumbles but able to self recover   Straight leg kicks with SPC 2x30ft    CGA    Backwards walking with SPC  2x25ft   CGA    Walking with horizontal head turns  2x20ft    Moderate instability    Walking with vertical head turns  2x20ft    Moderate instability    Marching with SPC 2x30ft  x CGA   Walking uneven surfaces with SPC 3x20ft   2 cobble foam and long foam put together; CGA. 1xminA for LOB on cobble foam   Curb steps with SBQC  x8 4\"  CGA to Miryam -- trialed with ea leg leading ascending and descending    ambulation with quad cane and ankle wt  x150ft 3# aw   CGA-- focusing on larger step lengths -- no ankle weights 8/24   Ambulation without AD in front of mirror 10ft x6  x CGA; No UE support, mirror for visual cues to improve heel/toe pattern          Other:   8/16: educated on the return to driving process and that she needs to talk with MD about this at next visit. Discussed from a PT stand point she has intact R side that would be sufficient for driving. Would want to get OT opinion on L hand use for turn signal. Another limiting factor is cervical ROM but feel this is improving and she would be ready for driving assessment. Specific Instructions for next treatment: Work on L stance control and weight translation to increase R step lengths, work on obstacle course with SPC, work on endurance     Assessment:    [x] Progressing toward goals. Today's session focused more on ankle stability and strengthening, as well as BLE strengthening and balance. Patient able to ambulate without AD today, with mirror used for visual feedback as patient was having difficulty with heel/toe gait pattern and would demonstrate decreased dorsiflexion.  Improvement noted with use of mirror and verbal cues. Educated patient on importance of strengthening bilateral ankles and additional HEP given for pt to complete 4 way ankle at home with yellow theraband. Patient demonstrated improvement with BLE strength as she was able to complete step ups to 6in step without UE support. Patient still required 1UE for assist down from step, but more for fear of falling rather than needing it for weightbearing support. Ended session with postural strengthening as patient continues to demonstrate rounded shoulder and flexed trunk appearance. [] No change. [] Other:    [x] Patient would continue to benefit from skilled physical therapy services in order to: improve strength,mobiltiy and motor control that impacts balance and mobility. GOALS -- updated 8/16/22  Goals to Continue for POC:   Pt will demonstrate >10 SLS stability with light UE support to safely allow pt to enter/exit her tub shower. - 6/15: partially met -- light UE support for RLE, still difficulty on the L        - 7/21: progressing -- minUE support for stability on LLE    - 8/16: progressing -- minUE support still on LLE but able to go for longer time (15s)          3. Pt will improve time on TUG to <20s with LRAD at mod IND level to demonstrate decreased fall risk. - 6/15: progressing -- 10 second improvement to 25.5s         - 7/21: MET with 2WW reaching 15.7s; will continue goal to see if she can achieve with cane         - 8/16: progressing with SPC - 23.5 sec this date   Pt will increase gait speed to >0.8 m/s  With LRAD at mod I level to demonstrate decreased fall risk and optimize mobility to at community level. - 6/15: limited increase in speed but pt is now mod IND with 2WW        - 7/21: progressing-- significant improvement in gait speed with 2WW to 0.67m/s & 0.46m/s with [facility]    -8/16: progressing with use of SPC -- will assess formally in upcoming sessions.    Pt will improve score on ESCOBEDO balance scale by >/= 45/56 points  to improve overall balance stability and decrease fall risk with mobility. - 6/15: progressing -- 33/56          - 7/21: progressing -- improves score ot 38/56   -8/16: progressing -- 44/56 greater improvement    Pt will increase strength of all major muscle groups of the LEs to 4/5 or greater demonstrating improved strength needed to maximize safety and efficiency with mobility tasks such as gait, transfers and stairs. - 6/15: progressing  -7/21; Progressing -- great progress made with LE strength, especially L ankle strength now all being above 3/5  - 8/16: progressing -- almost met but still working on L ankle eversion strength          3. Pt will be independent with home program addressing strength, flexibility and balance to maximize gains made in therapy to improve overall functional capacity for mobility.     -6/15: progressing -- will continue to update in POC  -7/21: progressing -- will continue to update in POC  -8/16: progressing -- will continue to update in POC, notes good compliance           4. Pt will improve score on OPTIMAL to 38/90 to demonstrate functional improvements with ADLs. 6. NEW GOAL 7/12: Pt will improve AROM in of cervical ROM to >60 bilaterally to improve ability to visually scan while driving.              -7/21; Progressing    -8/16: progressing -- 52 deg to R this date; 48 deg to L this date   8. NEW GOAL 7/12: Pt will demonstrate improved postural awareness with only minimal cues to correct posture throughout session to prevent undue stress to spine.               -7/21; Progressing    -8/16: progressing   9. New goal 8/16/22: Pt will be able to navigate curbs, uneven surfaces, and inclines/declines with [employer] with distant supervision to ensure She can safely navigate the community & get outdoors more.    10. New goal 8/16/22: Pt will be able to ambulate >500ft with 6mWT with SPC to demonstrate improved endurance and efficiency needed for community ambulation. Goals MET in POC:    Pt will improve score on ESCOBEDO balance scale by >/=6 points to improve overall balance stability and decrease fall risk with mobility. - 6/15: MET -- exceeds  Pt will be able to manage her walker up 1 step without instability to improve independence with access to her home. - 6/15: progressing   - 7/21: MET    Pt will improve time on 5xSTS to <15 seconds with use of 1 UE to decrease fall risk and increase functional capacity for mobility. - 6/15: completes with unilateral UE support this date but time is increased. - 7/21: progressing -- reaches 15.09s with 1 UE support   -8/16: MET -- 15s this date   Pt will improve score on OPTIMAL to 46/90 to demonstrate functional improvements with ADLs.   -6/15: will assess at a later time    - 7/21: will assess next session   - 8/16: MET 43/90 this date   Pt will report no falls for x6 weeks demonstrating improved safety with mobility and implementation of fall prevention strategies. - 6/15: meeting currently with no falls noted  - 7/21; MET -- no falls noted, verbalizes awareness of strategies to decrease falls   7. NEW GOAL 7/12: Pt will be able to maintain at least a neutral cervical alignment for > 75% of session showing improved cervical strength for keeping her head up to visualize her environment.              -7/21: progressing    -8/16: MET -- good alignment for all of session   8. NEW GOAL 7/12: Pt will demonstrate improved postural awareness with only minimal cues to correct posture throughout session to prevent undue stress to spine.               -7/21; Progressing         Patient stated Goals: to walk without a walker, improve balance     Pt. Education:  [x] Yes  [] No  [x] Reviewed Prior HEP/Ed  Method of Education: [x] Verbal  [x] Demo  [] Written  Comprehension of Education:  [x] Verbalizes understanding. [] Demonstrates understanding. [] Needs review.   [] Demonstrates/verbalizes HEP/Ed previously given.    Access Code: [email or web address]  URL: [email or web address]  Date: 08/26/2022  Prepared by: Geronimo Linton    Exercises  Long Sitting Ankle Eversion with Resistance - 1 x daily - 7 x weekly - 3 sets - 10 reps  Long Sitting Ankle Plantar Flexion with Resistance - 1 x daily - 7 x weekly - 3 sets - 10 reps  Long Sitting Ankle Inversion with Resistance - 1 x daily - 7 x weekly - 3 sets - 10 reps  Long Sitting Ankle Dorsiflexion with Anchored Resistance - 1 x daily - 7 x weekly - 3 sets - 10 reps      PLAN:    [x] Continue per POC               [] Other:     Patient Status:     [x] Continue per updated plan of care. [] Additional visits necessary.      [] Other:      Treatment Charges: Mins Units   []  Modalities     []  Ther Exercise     []  Manual Therapy     []  Ther Activities     []  Aquatics     [x]  Neuromuscular 62 4   [] Vasocompression     [] Gait Training     [] Dry needling        [] 1 or 2 muscles        [] 3 or more muscles     []  Other     Total Treatment time 62 4   * KX modifier needed*     Time In: 1602      Time Out:  2870      Electronically signed by:  Geronimo Linton PTA

## 2022-08-29 ENCOUNTER — CLINICAL DOCUMENTATION (OUTPATIENT)
Dept: OCCUPATIONAL THERAPY | Age: 62
End: 2022-08-29

## 2022-08-29 RX ORDER — LEVOTHYROXINE SODIUM 0.1 MG/1
TABLET ORAL
Qty: 30 TABLET | Refills: 3 | OUTPATIENT
Start: 2022-08-29

## 2022-08-29 RX ORDER — LISINOPRIL 5 MG/1
5 TABLET ORAL DAILY
Qty: 30 TABLET | Refills: 3 | Status: SHIPPED | OUTPATIENT
Start: 2022-08-29

## 2022-08-29 RX ORDER — LISINOPRIL 5 MG/1
TABLET ORAL
Qty: 30 TABLET | Refills: 3 | OUTPATIENT
Start: 2022-08-29

## 2022-08-29 RX ORDER — LEVOTHYROXINE SODIUM 0.1 MG/1
100 TABLET ORAL DAILY
Qty: 30 TABLET | Refills: 3 | Status: SHIPPED | OUTPATIENT
Start: 2022-08-29

## 2022-08-29 NOTE — PROGRESS NOTES
[facility]   OCCUPATIONAL THERAPY  NOTE   OUTPATIENT   Date: 22  Patient Name: Marion Johnson       MRN: [id number]   Account #:     : 1960  (65 y.o.)  Gender: female         REASON FOR MISSED TREATMENT:  Pt called on  2022 and spoke with  informing her that she has to cancel 2022 appt     \"due to her  needing to have a procedure done due to a work accident - she  wanted to make sure you knew why she was missing because she was worried about out attendance policy\"      Shaji Monday, OT

## 2022-08-30 ENCOUNTER — APPOINTMENT (OUTPATIENT)
Dept: PHYSICAL THERAPY | Age: 62
End: 2022-08-30
Payer: MEDICARE

## 2022-08-30 ENCOUNTER — APPOINTMENT (OUTPATIENT)
Dept: OCCUPATIONAL THERAPY | Age: 62
End: 2022-08-30
Payer: MEDICARE

## 2022-09-01 ENCOUNTER — HOSPITAL ENCOUNTER (OUTPATIENT)
Dept: OCCUPATIONAL THERAPY | Age: 62
Setting detail: THERAPIES SERIES
Discharge: HOME | End: 2022-09-01
Payer: MEDICARE

## 2022-09-01 ENCOUNTER — HOSPITAL ENCOUNTER (OUTPATIENT)
Dept: PHYSICAL THERAPY | Age: 62
Setting detail: THERAPIES SERIES
Discharge: HOME | End: 2022-09-01
Payer: MEDICARE

## 2022-09-01 NOTE — CARE COORDINATION
[] [address]  [place]: [phone]   F: [phone]     Physical Therapy Cancel/No Show note    Date: 2022  Patient: Perla Tay  : 1960  MRN: [id number]    Visit Count:   Cancels/No Shows to date: 0    For today's appointment patient:    [x]  Cancelled    [] Rescheduled appointment    [] No-show     Reason given by patient:    [x]  Patient ill    []  Conflicting appointment    [] No transportation      [] Conflict with work    [] No reason given    [] Weather related    [] [id number]    [] Other:      Comments:        [x] Next appointment was confirmed    Electronically signed by: Estrellita Cheadle, PT

## 2022-09-01 NOTE — PROGRESS NOTES
[facility]   OCCUPATIONAL THERAPY MISSED TREATMENT NOTE   OUTPATIENT   Date: 22  Patient Name: Tania Oscar         MRN: [id number]   Account #: [de-identified]    : 1960  (65 y.o.)  Gender: female   Diagnosis: spinal cord injury , cervical region sequela (S14.109S), incomplete quadriplegia at C5-6 level (G82.54), cauda equina syndrome ( G83.4) - ICD-10 codes  OT Visit Information  OT Insurance Information: Medicare  Total # of Visits Approved: 27  Canceled Appointment: 3       REASON FOR MISSED TREATMENT:     Patient cancelled due to illness. Pt called & cancel therapy today d/t not feeling well.      Marialuisa Tim, OT

## 2022-09-07 ENCOUNTER — HOSPITAL ENCOUNTER (OUTPATIENT)
Dept: PHYSICAL THERAPY | Age: 62
Setting detail: THERAPIES SERIES
Discharge: HOME | End: 2022-09-07
Payer: MEDICARE

## 2022-09-07 PROCEDURE — 97112 NEUROMUSCULAR REEDUCATION: CPT

## 2022-09-07 PROCEDURE — 97110 THERAPEUTIC EXERCISES: CPT

## 2022-09-07 NOTE — FLOWSHEET NOTE
[facility]   [address]   Phone: [phone]   Fax: [phone]    Physical Therapy Daily Treatment Note      Date:  2022  Patient Name:  Mt Best    :  1960  MRN: [id number]  Physician: Albina Mast MD                            Insurance: [place] - based on [place] -- [facility] Diagnosis:   Q71.627G (ICD-10-CM) - Spinal cord injury, cervical region, sequela ([facility] [address]   G82.54 (ICD-10-CM) - Incomplete quadriplegia at C5-6 level ([facility] [address]   G83.4 (ICD-10-CM) - Cauda equina syndrome ([facility] [address]   Rehab Codes: R25 pain , R53.1 weakness, Z74.0 reduced mobility, R29.6 high fall risk, R27.8 lack of coordination, R26.89 gait abnormality   Date of symptom onset: 21 -- cauda equina; 22 Cervical surgeries   Next 's appt.: 10/12/22 with Neurosurgery; 22 with PMR Dr. Xochitl Hennessy   Total Visits:   Cx/Ns:1/0     Precautions: Can now wean out of cervical collar; not lifting greater than a gallon of milk:       Subjective:  Patient reports not doing much the past week and a half and feels very stiff. She states she had a lot of personal things going on and she felt down, resulting in not much physical activity. Pain:  [x] Yes  [] No Location: Neck down to (B) Shoulders/UEs to knuckles in hands (B) LE's int feet and toes    Pain Rating: (0-10 scale) 5/10  Pain altered Tx:  [x] No  [] Yes  Action:  Comments:       Objective:   Exercises:  Exercise Reps/ Time Weight/ Level Completed  Today Comments   nustep  5' Level 5 x           Cervical stretching        Upper trap stretch  2x30s ea   x Therapist over pressure    Levator scap stretch  2x30s ea    Therapist overpressure    Rotation stretch  2x30s ea    Therapist overpressure    Doorway pec stretch  2x30s  x           Postural strengthening     Half roll behind in sitting for posture promotion.     Seated rows  2x15 green  x    Seated horizontal ABD  2x15 3\" hold green x clinic parking lot, pt was challenged to complete step down and step ups with the curb. Patient was very fearful and requested a \"practice\" with BUE support. Encouraged patient to trust herself and she was able to do multiple reps with CGA. Ended session with discussion on importance of staying active, even if it just doing laps around the house. [] No change. [] Other:    [x] Patient would continue to benefit from skilled physical therapy services in order to: improve strength,mobiltiy and motor control that impacts balance and mobility. GOALS -- updated 8/16/22  Goals to Continue for POC:   Pt will demonstrate >10 SLS stability with light UE support to safely allow pt to enter/exit her tub shower. - 6/15: partially met -- light UE support for RLE, still difficulty on the L        - 7/21: progressing -- minUE support for stability on LLE    - 8/16: progressing -- minUE support still on LLE but able to go for longer time (15s)          3. Pt will improve time on TUG to <20s with LRAD at mod IND level to demonstrate decreased fall risk. - 6/15: progressing -- 10 second improvement to 25.5s         - 7/21: MET with 2WW reaching 15.7s; will continue goal to see if she can achieve with cane         - 8/16: progressing with SPC - 23.5 sec this date   Pt will increase gait speed to >0.8 m/s  With LRAD at mod I level to demonstrate decreased fall risk and optimize mobility to at community level. - 6/15: limited increase in speed but pt is now mod IND with 2WW        - 7/21: progressing-- significant improvement in gait speed with 2WW to 0.67m/s & 0.46m/s with [facility]    -8/16: progressing with use of SPC -- will assess formally in upcoming sessions. Pt will improve score on ESCOBEDO balance scale by >/= 45/56 points  to improve overall balance stability and decrease fall risk with mobility.         - 6/15: progressing -- 33/56          - 7/21: progressing -- improves score ot 38/56   -8/16: progressing -- 44/56 greater improvement    Pt will increase strength of all major muscle groups of the LEs to 4/5 or greater demonstrating improved strength needed to maximize safety and efficiency with mobility tasks such as gait, transfers and stairs. - 6/15: progressing  -7/21; Progressing -- great progress made with LE strength, especially L ankle strength now all being above 3/5  - 8/16: progressing -- almost met but still working on L ankle eversion strength          3. Pt will be independent with home program addressing strength, flexibility and balance to maximize gains made in therapy to improve overall functional capacity for mobility.     -6/15: progressing -- will continue to update in POC  -7/21: progressing -- will continue to update in POC  -8/16: progressing -- will continue to update in POC, notes good compliance           4. Pt will improve score on OPTIMAL to 38/90 to demonstrate functional improvements with ADLs. 6. NEW GOAL 7/12: Pt will improve AROM in of cervical ROM to >60 bilaterally to improve ability to visually scan while driving.              -7/21; Progressing    -8/16: progressing -- 52 deg to R this date; 48 deg to L this date   8. NEW GOAL 7/12: Pt will demonstrate improved postural awareness with only minimal cues to correct posture throughout session to prevent undue stress to spine.               -7/21; Progressing    -8/16: progressing   9. New goal 8/16/22: Pt will be able to navigate curbs, uneven surfaces, and inclines/declines with [facility] with distant supervision to ensure She can safely navigate the community & get outdoors more. 10. New goal 8/16/22: Pt will be able to ambulate >500ft with 6mWT with SPC to demonstrate improved endurance and efficiency needed for community ambulation. Goals MET in POC:    Pt will improve score on ESCOBEDO balance scale by >/=6 points to improve overall balance stability and decrease fall risk with mobility.         - 6/15: MET -- 10 reps  Long Sitting Ankle Plantar Flexion with Resistance - 1 x daily - 7 x weekly - 3 sets - 10 reps  Long Sitting Ankle Inversion with Resistance - 1 x daily - 7 x weekly - 3 sets - 10 reps  Long Sitting Ankle Dorsiflexion with Anchored Resistance - 1 x daily - 7 x weekly - 3 sets - 10 reps      PLAN:    [x] Continue per POC               [] Other:     Patient Status:     [x] Continue per updated plan of care. [] Additional visits necessary.      [] Other:      Treatment Charges: Mins Units   []  Modalities     [x]  Ther Exercise 10 1   []  Manual Therapy     []  Ther Activities     []  Aquatics     [x]  Neuromuscular 45 3   [] Vasocompression     [] Gait Training     [] Dry needling        [] 1 or 2 muscles        [] 3 or more muscles     []  Other     Total Treatment time 55 3   * KX modifier needed*     Time In: 5:00pm      Time Out:  5:55pm      Electronically signed by:  Tara Dawkins PTA

## 2022-09-12 ENCOUNTER — HOSPITAL ENCOUNTER (OUTPATIENT)
Dept: PHYSICAL THERAPY | Age: 62
Setting detail: THERAPIES SERIES
Discharge: HOME | End: 2022-09-12
Payer: MEDICARE

## 2022-09-12 ENCOUNTER — HOSPITAL ENCOUNTER (OUTPATIENT)
Dept: OCCUPATIONAL THERAPY | Age: 62
Setting detail: THERAPIES SERIES
Discharge: HOME | End: 2022-09-12
Payer: MEDICARE

## 2022-09-12 PROCEDURE — 97110 THERAPEUTIC EXERCISES: CPT

## 2022-09-12 PROCEDURE — 97116 GAIT TRAINING THERAPY: CPT

## 2022-09-12 PROCEDURE — 97112 NEUROMUSCULAR REEDUCATION: CPT

## 2022-09-12 ASSESSMENT — PAIN DESCRIPTION - PAIN TYPE: TYPE: ACUTE PAIN

## 2022-09-12 ASSESSMENT — PAIN DESCRIPTION - FREQUENCY: FREQUENCY: CONTINUOUS

## 2022-09-12 ASSESSMENT — PAIN DESCRIPTION - DESCRIPTORS: DESCRIPTORS: TIGHTNESS;TINGLING

## 2022-09-12 ASSESSMENT — PAIN SCALES - GENERAL: PAINLEVEL_OUTOF10: 5

## 2022-09-12 ASSESSMENT — PAIN DESCRIPTION - LOCATION: LOCATION: ARM;NECK;LEG

## 2022-09-12 ASSESSMENT — PAIN DESCRIPTION - ORIENTATION: ORIENTATION: LEFT

## 2022-09-12 NOTE — FLOWSHEET NOTE
[facility]   [address]   Phone: [phone]   Fax: [phone]    Physical Therapy Daily Treatment Note      Date:  2022  Patient Name:  Dodie Galaviz    :  1960  MRN: [id number]  Physician: Susan Grace MD                            Insurance: Medicare - based on [place] -- [facility] Diagnosis:   S40.512M (ICD-10-CM) - Spinal cord injury, cervical region, sequela ([facility]   G82.54 (ICD-10-CM) - Incomplete quadriplegia at C5-6 level ([facility]   G83.4 (ICD-10-CM) - Cauda equina syndrome ([facility]   Rehab Codes: R25 pain , R53.1 weakness, Z74.0 reduced mobility, R29.6 high fall risk, R27.8 lack of coordination, R26.89 gait abnormality   Date of symptom onset: 22 -- cauda equina; 22 Cervical surgeries   Next 's appt.: 10/12/22 with Neurosurgery; 22 with PMR Dr. Nirav De La Cruz   Total Visits:   Cx/Ns:1/0     Precautions: Can now wean out of cervical collar; not lifting greater than a gallon of milk:       Subjective:  Patient reports to therapy with 2WW. She notes she is having a arthritic day with radiating pain into the lower extremities and having swelling in her ankles. She notes she has not been using the cane at home due to limited confidence. She notes she has it out but does not use it.      Pain:  [x] Yes  [] No Location: Neck down to (B) Shoulders/UEs to knuckles in hands (B) LE's int feet and toes    Pain Rating: (0-10 scale) 5/10  Pain altered Tx:  [x] No  [] Yes  Action:  Comments:       Objective:   Exercises:  Exercise Reps/ Time Weight/ Level Completed  Today Comments   nustep  5' Level 5 x BLE/BUE           Cervical stretching        Upper trap stretch  2x30s ea    Therapist over pressure    Levator scap stretch  2x30s ea    Therapist overpressure    Rotation stretch  2x30s ea    Therapist overpressure    Doorway pec stretch  2x30s             Postural strengthening     Half roll behind in sitting for posture promotion. Seated rows  2x15 green  x    Seated horizontal ABD  2x15 3\" hold green x    Seated B ER  2x15  green x With scap squeeze at end   Standing resisted rotations  2x10 ea  green  Good challenge for balance           Standing       SB stretch 3x30''      HS stretch on step 3x30''   With therapist overpressure at knee and holding foot in DF   3 way stepping  2x10  x No UE support; CGA   - x1 LOB with backwards needing Miryam to correct    Eric steps overs  2x10 ea   x UE support on cane           GAIT TRAINING        Ambulated throughout session with SPC  50t   2x90 ft   x Rate of perceived stability 6/10    Walking to  cones off floor and brining back to table  4 min total  5x cones  x Ambulates about 10ft to/from cones with SPC, SBA    Ambulation without AD in front of mirror 10ft x6   CGA; No UE support, mirror for visual cues to improve heel/toe pattern and posture   Ambulation with SPC in grass 20ftx4   CGA, cues for step height and upright posture   Curb negotiation outside 4x   Up and down curb outside clinic, CGA and cues for sequencing. Other:   9/12: Inspected R forearm as it is increased in size, swollen, pitting edema 2+, and developing a firmness from the elbow down. See pictures below for better comparison. 8/16: educated on the return to driving process and that she needs to talk with MD about this at next visit. Discussed from a PT stand point she has intact R side that would be sufficient for driving. Would want to get OT opinion on L hand use for turn signal. Another limiting factor is cervical ROM but feel this is improving and she would be ready for driving assessment. Specific Instructions for next treatment: Work on L stance control and weight translation to increase R step lengths, work on obstacle course with SPC, work on endurance     Assessment:    [x] Progressing toward goals.   Initiated session with seferino to improve joint mobility prior to other activities. Worked on increasing confidence with SPC to promote pt to use it more at home. Had her ambulate btwn activities in session with SPC. Does have limited stance times bilaterally which leads to shorter step lengths. Incorporated interventions that challenged stance control to try to build stability. Still limited by confidence. Does have x1 LOB with backwards stepping that needs modA to recover. Completed walking functional task of going a distance and picking up cones off floor to carry back to table with SPC to work on ambulation household distances with SBA. Does not have a loss of balance with this but does not feel stable rating her perceived stability at 6/10. Also continued with postural strengthening with pt needing mod cues to remember to retract bilateral shoulders to maintain a better upright position. During session pt does inquire about her R arm being more swollen and tight vs L arm. Inspected arm with notable size difference with most edema (2+ pitting significantly) elbow and below. She denies pain but can tell the arm in heavier and feeling tight. Took pictures and encouraged pt to reach out to MD. This PT also communicated with referring provider. Encouraged the patient to continue working with [facility] to increase confidence. She still is limited at times due to decreased neuromuscular control, limited proprioception, LE pain, LE fatigue, and decreased motor control-- all which can be further improved with PT.          [] No change. [] Other:    [x] Patient would continue to benefit from skilled physical therapy services in order to: improve strength,mobiltiy and motor control that impacts balance and mobility. GOALS -- updated 8/16/22  Goals to Continue for POC:   Pt will demonstrate >10 SLS stability with light UE support to safely allow pt to enter/exit her tub shower.         - 6/15: partially met -- light UE support for RLE, still difficulty on the L        - 7/21: progressing -- minUE support for stability on LLE    - 8/16: progressing -- minUE support still on LLE but able to go for longer time (15s)          3. Pt will improve time on TUG to <20s with LRAD at mod IND level to demonstrate decreased fall risk. - 6/15: progressing -- 10 second improvement to 25.5s         - 7/21: MET with 2WW reaching 15.7s; will continue goal to see if she can achieve with cane         - 8/16: progressing with SPC - 23.5 sec this date   Pt will increase gait speed to >0.8 m/s  With LRAD at mod I level to demonstrate decreased fall risk and optimize mobility to at community level. - 6/15: limited increase in speed but pt is now mod IND with 2WW        - 7/21: progressing-- significant improvement in gait speed with 2WW to 0.67m/s & 0.46m/s with [facility]    -8/16: progressing with use of SPC -- will assess formally in upcoming sessions. Pt will improve score on ESCOBEDO balance scale by >/= 45/56 points  to improve overall balance stability and decrease fall risk with mobility. - 6/15: progressing -- 33/56          - 7/21: progressing -- improves score ot 38/56   -8/16: progressing -- 44/56 greater improvement    Pt will increase strength of all major muscle groups of the LEs to 4/5 or greater demonstrating improved strength needed to maximize safety and efficiency with mobility tasks such as gait, transfers and stairs. - 6/15: progressing  -7/21; Progressing -- great progress made with LE strength, especially L ankle strength now all being above 3/5  - 8/16: progressing -- almost met but still working on L ankle eversion strength          3.  Pt will be independent with home program addressing strength, flexibility and balance to maximize gains made in therapy to improve overall functional capacity for mobility.     -6/15: progressing -- will continue to update in POC  -7/21: progressing -- will continue to update in POC  -8/16: progressing -- will continue to update in POC, notes good compliance           4. Pt will improve score on OPTIMAL to 38/90 to demonstrate functional improvements with ADLs. 6. NEW GOAL 7/12: Pt will improve AROM in of cervical ROM to >60 bilaterally to improve ability to visually scan while driving.              -7/21; Progressing    -8/16: progressing -- 52 deg to R this date; 48 deg to L this date   8. NEW GOAL 7/12: Pt will demonstrate improved postural awareness with only minimal cues to correct posture throughout session to prevent undue stress to spine.               -7/21; Progressing    -8/16: progressing   9. New goal 8/16/22: Pt will be able to navigate curbs, uneven surfaces, and inclines/declines with [facility] with distant supervision to ensure She can safely navigate the community & get outdoors more. 10. New goal 8/16/22: Pt will be able to ambulate >500ft with 6mWT with SPC to demonstrate improved endurance and efficiency needed for community ambulation. Goals MET in POC:    Pt will improve score on ESCOBEDO balance scale by >/=6 points to improve overall balance stability and decrease fall risk with mobility. - 6/15: MET -- exceeds  Pt will be able to manage her walker up 1 step without instability to improve independence with access to her home. - 6/15: progressing   - 7/21: MET    Pt will improve time on 5xSTS to <15 seconds with use of 1 UE to decrease fall risk and increase functional capacity for mobility. - 6/15: completes with unilateral UE support this date but time is increased. - 7/21: progressing -- reaches 15.09s with 1 UE support   -8/16: MET -- 15s this date   Pt will improve score on OPTIMAL to 46/90 to demonstrate functional improvements with ADLs.   -6/15: will assess at a later time    - 7/21: will assess next session   - 8/16: MET 43/90 this date   Pt will report no falls for x6 weeks demonstrating improved safety with mobility and implementation of fall prevention strategies.     - 6/15: meeting currently with no falls noted  - 7/21; MET -- no falls noted, verbalizes awareness of strategies to decrease falls   7. NEW GOAL 7/12: Pt will be able to maintain at least a neutral cervical alignment for > 75% of session showing improved cervical strength for keeping her head up to visualize her environment.              -7/21: progressing    -8/16: MET -- good alignment for all of session   8. NEW GOAL 7/12: Pt will demonstrate improved postural awareness with only minimal cues to correct posture throughout session to prevent undue stress to spine.               -7/21; Progressing         Patient stated Goals: to walk without a walker, improve balance     Pt. Education:  [x] Yes  [] No  [x] Reviewed Prior HEP/Ed  Method of Education: [x] Verbal  [x] Demo  [] Written  Comprehension of Education:  [x] Verbalizes understanding. [] Demonstrates understanding. [] Needs review. [] Demonstrates/verbalizes HEP/Ed previously given. Access Code: [email or web address]  URL: [employer]/  Date: 08/26/2022  Prepared by: Shana Crowder    Exercises  Long Sitting Ankle Eversion with Resistance - 1 x daily - 7 x weekly - 3 sets - 10 reps  Long Sitting Ankle Plantar Flexion with Resistance - 1 x daily - 7 x weekly - 3 sets - 10 reps  Long Sitting Ankle Inversion with Resistance - 1 x daily - 7 x weekly - 3 sets - 10 reps  Long Sitting Ankle Dorsiflexion with Anchored Resistance - 1 x daily - 7 x weekly - 3 sets - 10 reps      PLAN:    [x] Continue per POC               [] Other:     Patient Status:     [x] Continue per updated plan of care. [] Additional visits necessary.      [] Other:      Treatment Charges: Mins Units   []  Modalities     [x]  Ther Exercise 14 1   []  Manual Therapy     []  Ther Activities 3 --   []  Aquatics     [x]  Neuromuscular 15 1   [] Vasocompression     [x] Gait Training 25 2   [] Dry needling        [] 1 or 2 muscles        [] 3 or more muscles     []  Other     Total Treatment time 57 4   * KX modifier needed* Time In: 5:00pm      Time Out: 5: 57 pm      Electronically signed by:  Josh Robles PT

## 2022-09-12 NOTE — PROGRESS NOTES
[facility] [address] [place] [address]. [address]         Phone: [phone]       Fax: [phone]     Occupational Therapy Daily Treatment note     Occupational Therapy: Daily Note   Patient: Mitchell Maritnez (55 y.o. female)   Examination Date: 2022  No data recorded  Progress Note Counter: Next MD appt 2022     :  1960 # of Visits since [facility]:   34   MRN: [id number]  CSN: [id number] Start of Care Date: 05/10/2022   Insurance: Payor: MEDICARE / Plan: MEDICARE PART A AND B / Product Type: *No Product type* /   Insurance ID: [id number] - (Medicare) Secondary Insurance (if applicable): Insurance Information: Medicare   Referring Physician: MD Dr Krishna Heard   PCP: Naheed Grossman MD Visits to Date/Visits Approved:     No Show/Cancelled Appts:   /       Medical Diagnosis: Unspecified injury at unspecified level of cervical spinal cord, sequela [S14.109S]  Quadriplegia, C5-C7 incomplete [G82.54]  Cauda equina syndrome [G83.4] spinal cord injury , cervical region sequela (S14.109S), incomplete quadriplegia at C5-6 level (G82.54), cauda equina syndrome ( G83.4) - ICD-10 codes  Treatment Diagnosis: bilateral UE weakness & impaired coordination, bilateral shoulder stiffness, impaired sensation - hands lt more than rt  (M62.81, R27.9, M25.611, M25.612,R20.2        SUBJECTIVE EXAMINATION   Pain Level: Pain Screening  Patient Currently in Pain: Yes  Pain Assessment: 0-10  Pain Level: 5  Post Treatment Pain Level: 5  Patient's Stated Pain Goal: 0 - No pain  Pain Type: Acute pain  Pain Location: Arm, Neck, Leg  Pain Orientation: Left  Pain Descriptors: Tightness, Tingling (tingling in fingers & feet)  Pain Frequency: Continuous    Patient Comments: Subjective: Pt reports her pain is a 5 in her neck ,legs, arm. Pt states that she is scheduled for the  rehab eval tomorrow.     HEP Compliance: Good Decreased ROM, Decreased strength, Decreased coordination, Decreased fine motor control, Decreased high-level IADLs, Decreased sensation    Post-Treatment Pain Level: 5    Prognosis: Good    Activity Tolerance: Patient tolerated treatment well             TREATMENT PLAN   REQUIRES OT FOLLOW-UP: Yes  Type: Outpatient  Plan  Plan Frequency: 2x/week  Plan Weeks: 20 visits+ 10 more visits after 07/027/2022  Current Treatment Recommendations: Strengthening, ROM, Patient/Caregiver education & training, Coordination training, Positioning  Plan Comment: continue OT       Therapy Time  Individual Time In: 5469  Individual Time Out: 5051  Minutes: 45  Timed Code Treatment Minutes: 45 Minutes       Electronically signed by Abiola Brown OT  on 9/12/2022 at 6:04 PM       Treatment Charges: Mins Units Time In/Out   [] Evaluation       []  Low       []  Moderate       []  High      []  Electrical Stim      []  Iontophoresis      []  Paraffin      []  Ultrasound        []  Masage      []  Neuromuscular Re-ed      []  ADL      [x]  Ther Exercise 45 3    []  Ther Activities      []  Neuro Re-Ed      []  Splinting      []  Other      Total Treatment time 45 min 3          POC NOTE

## 2022-09-13 ENCOUNTER — HOSPITAL ENCOUNTER (OUTPATIENT)
Dept: OCCUPATIONAL THERAPY | Age: 62
Setting detail: THERAPIES SERIES
Discharge: HOME | End: 2022-09-13
Payer: MEDICARE

## 2022-09-13 PROCEDURE — 97537 COMMUNITY/WORK REINTEGRATION: CPT

## 2022-09-13 PROCEDURE — 97167 OT EVAL HIGH COMPLEX 60 MIN: CPT

## 2022-09-13 NOTE — PROGRESS NOTES
[address] [facility]  Rehabilitation Services   Occupational Therapy Initial  Evaluation  Date: 22  Patient Name: Birdie Erickson      MRN: [id number]  Account: [de-identified]   : 1960  (65 y.o.)  Gender: female     Insurance Information:  Medicare - based on [place] -- [facility]     OT Visit Information:  Visit #: 1; Outpatient OT     Physician: Isabel Rangel MD                           Medical Diagnosis:   W45.041D (ICD-10-CM) - Spinal cord injury, cervical region, sequela ([address]   G82.54 (ICD-10-CM) - Incomplete quadriplegia at C5-6 level ([address]   G83.4 (ICD-10-CM) - Cauda equina syndrome ([address]   Rehab Codes: R25 pain , R53.1 weakness, Z74.0 reduced mobility, R29.6 high fall risk, R27.8 lack of coordination  Date of symptom onset: 21 -- cauda equina; 22 Cervical surgeries   Next 's appt.: 10/12/22 with Neurosurgery; 22 with PMR Dr. Sammy Pearce      Reason for Referral: Spinal cord injury, cervical region, sequela ([address]   Birdie Erickson, 65 y.o., female (Nontraumatic cervical spinal cord injury). Patient with recent cervical spinal cord injury related to critical cervical stenosis who was admitted to [facility] for inpatient acute rehabilitation from 4/15/22 - 22. Patient c/o reduced endurance which is gradually improving with parasthesias in B hands feet. Patient currently being followed by Pain Management and has been followed for the past 11 yeas with patient currently prescribed Hydropcodone  every 6 hours & Methadone 2x/day. Patient with a history of anxiety approximately one year ago and is currently taking an anti-anxiety medication.      Past Surgical History:   Procedure Laterality Date    BACK SURGERY         lower back x 3, cervical- x 1: C4- C6, 2014     BREAST SURGERY Right       mastectomy with reconstruction    CERVICAL FUSION N/A 2022     C5 CORPECTOMY, USE OF INTRAOPERATIVE CERVICAL TRACTION performed by Giovana Gonzales, DO at [address] 4/12/2022     POSTERIOR FIXATION C2-C7 performed by Destinee Dunn DO at [address]   about 2018    HERNIA REPAIR Bilateral       inguinal    HYSTERECTOMY, TOTAL ABDOMINAL (CERVIX REMOVED)        LUMBAR SPINE SURGERY N/A 12/30/2021     LUMBAR LAMINECTOMY L2-S1 performed by Destinee Dunn DO at [address], RADICAL        OTHER SURGICAL HISTORY   04/12/2022     C5 CORPECTOMY, USE OF INTRAOPERATIVE CERVICAL TRACTION (N/A Spine Cervical)          Diagnostic Studies:      CT CERVICAL SPINE 6/20/22  FINDINGS:   BONES/ALIGNMENT: Patient is status post fusion with ACDF hardware placement   C4 through C7. Posterior stabilization hardware is present C2 through C7. Vertebral bodies are well maintained in height without evidence of acute   fracture. However, the odontoid process is located anteriorly with the   dorsal edge of the odontoid even with the clivus. DEGENERATIVE CHANGES: Multilevel degenerative changes are present. Diffuse   mild neural foraminal narrowing is present. SOFT TISSUES: There is no prevertebral soft tissue swelling. Bronchiectasis   and atelectasis right upper lobe medially is noted. Atherosclerotic   calcification of the aortic arch is present. Impression   Postsurgical changes. Ventral subluxation of the odontoid in relation to the   clivus. Multilevel degenerative and degenerative disc changes. CT THORACIC SPINE 6/20/22  FINDINGS:   BONES/ALIGNMENT: No acute fracture are traumatic malalignment is noted. Fixative posterior hardware is present on the edge of the film in the lower   thoracic spine. Accentuated kyphotic curvature from T2 to the bottom of T9   of 47 degrees is noted. Structures are osteopenic. DEGENERATIVE CHANGES: Diffuse mild degenerative changes and moderate   degenerative disc changes are present throughout the thoracic spine. Neural   foramina are fairly patent.   Moderate dextroscoliotic curvature of 12 degrees   is noted from the top of T5 to the bottom of T9. No gross bony canal   stenosis is evident. SOFT TISSUES: No paraspinal mass is seen. Pulmonary right upper lobe   bronchiectasis is noted. Impression   Osteopenia with dextro kyphoscoliotic curvature of the thoracic spine without   acute fracture or traumatic malalignment. Vertebral bodies are well   maintained in height. Subjective: Patient reporting concern regarding sensory impairment in R distal LE     Clinical Assessment:  Seizure History: n/a  Hearing: Intact bilaterally  Participation in Rehabilitation Therapies: Patient continues to participate in outpatient OT & PT and participated in both during inpatient rehabilitation. 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  Current Driving Habits: Patient drives primarily locally during the day  Driving Goals:  To resume primarily local daytime driving  Input from Family/Significant Others (as appropriate): patient's spouse is supportive of patient returning to driving    Visual Perceptual Assessment:   Corrective Lenses: patient wears nonprescription near vision corrective lenses   Visual Acuity (Each eye with/without correction 20/40; both eyes with/without correction 20/40; if blind in one eye-other with/without correction 20/30; minimum visual acuity 20/70 in better eye with restrictions; daytime driving only between 20/50-20/70 or vision in one eye between 20/40-20-60; right or left outside mirror if blind in one eye): 20/20 binocular; 20/30 left eye; and 20/30 right eye which meets the legal vision requirement for driving   Visual Fields (each eye must have 70 degrees temporal; superior and inferior visual quadrants): patient able to attend to 2/4 simultaneous bilateral stimuli with patient inattentive of 1 visual stimuli presented on right and 1 on left, indicating moderately reduced ability to attend to bilateral simultaneous stimulation; patient with score of 4/4 on unilateral stimulation in both right and left fields. Far Lateral Phoria (muscle balance required for alignment): patient with score of 9 indicating exophoria that is WNL  Oculomotor Control/Dynamic Vision   Visual Pursuits (Jerky versus smooth; ability to maintain visual fixation): patient with jerky versus smooth pursuits   Saccades (excessive over/undershooting or searching): patient with mild searching   Convergence (eye teaming for near vision): WNL  Far Color Perception: patient with score of 6/8 indicating mild color deficiency  Far Color Recognition: patient with score of 16/16 indicating intact color recogntion  Far Stereo Depth Perception (n/a with monocular vision): patient with score of 4/9 indicating moderately impaired depth perception  Contrast Sensitivity (with and without glare): patient with score of 9/9 indicating intact contrast sensitivity both with and without glare  Road Sign Recognition Test: patient able to recognized 12/12 common road signs  Visuospatial Skills:   Copy Cube Test: Intact speed & accuracy    CLOX Clock Drawing Test:  Intact speed & accuracy    Cognitive Screen:  Attention Testing  Sustained Visual Attention: Number Tapping (norm 5): patient able to repeat 5 digits forward which is [facility]  Sustained Auditory Attention: Digit Span Forward/Reverse (norm 5 forward; 4 reverse): patient able to repeat 5 digits forward and 4 in reverse which is [facility]  Shifting of Visual Attention: Modified Trail Making B Sequential Numbers & Letters (58-87 seconds; =/< 3 errors):   Intact speed & accuracy  Selective Visual Attention: Modified Stroop Test B (1-25; normal score=1 error, 60 sec.): 2 errors indicating mild impairment  Visual Recall: Contextual Memory Test (norms ,40: 15 immediate, 13 delayed; 40-49: 12 immediate, 11 delayed; >59 11 immediate, 9 delayed): patient with immediate recall of 11 visual objects & 9 following a delay with interference indicating visual recall is [facility]; Awareness, Ability to Plan Ahead: [employer] Maze Test: (60 seconds, 0 errors):   Intact speed & accuracy   Mental Endurance: Good throughout evaluation    Physical Function:   Muscle Tone: WFL  Functional Range of Motion:   Cervical rotation (=/>50%; may require modifications to include additional mirrors): patient with 0-60 degrees of lateral rotation to left; WFL to right  Upper Extremities (minimum  degrees unilateral shoulder flexion required for steering): WFL bilaterally  Lower Extremities: WFL with exception of L ankle DF to neutral  Functional Strength: Precautions: Lift  Upper Extremities (3+/5-4/5 UE requires power steering): WFL throughout R UE and distal L UE; L sh n/a d/t cervical spine precautions  Lower Extremity (4/5 LE strength required for acceleration & braking; 3+/5-4-/5 requires reduced effort acceleration/braking): 4/5 proximally L LE and 4-/5 L ankle; WFL throughout R LE  Functional  Strength (35#  strength required for steering and operating gear shift lever): R  65#; L  48#  Functional Gross Motor Coordination   Upper Extremity Coordination (Rapid Alternating Finger-to-Nose test): Mild impairment L UE  Lower Extremity Coordination (Bilateral foot-tapping test - 18 +/-4 within 10 sec.): Impaired left foot   Functional Balance    Static & Dynamic Sitting: Good    Static & Dynamic Standing: Good static; F+ dynamic   Functional Sensation  LE Proprioception & Kinesthesia (discrimination between gas and accelerator pedals; pressure on pedal within norms for pedal response speed and speed control): parasthesias bilateral hands & feet (L > R)   Physical Endurance: Good throughout evaluation    Functional Mobility:  Functional Mobility: Patient requires SBA with ambulation using SPC and modified independent with 2 WW. Mobility Aids/Assistive Devices: SPC, 2 [address]  Functional Transfers: Modified independent    Simulated Assessments w/Seat & Back Cushions (to maximize clearance over wheel due to decreased thoracic extension):  Operational Skills   Primary Controls (accelerator, brake, steering wheel)  Secondary Controls (fastening/unfastening seatbelt; adjusting seat/steering wheel; starting the drive; turn signal; checking for traffic; &, operating gear shift): Modified independent with use of seat & back cushions    Brake Reaction Times (release of gas to apply the brake of 0.4/100 sec. < 65; 0.5/100 sec. > 65; stopping distance of 175 feet): patient with average brake pedal response speed of 0.5 sec, which is WNL; patient's average stopping distance was 80' which is [facility]. Basic Vehicle Control (two-mikaela, 40 mph, 4-way stops, minimal cross traffic, pedestrians): patient able to control speed throughout drive; patient crossed center 1x, off road 4x and out of mikaela 4% of drive time with patient able to safely negotiate 4/5 intersections at an average speed of 35 mph. Results of Evaluation: Patient scores on clinical visual perceptual evaluation revealed mild impairment in dynamic vision in the areas of visual pursuits and saccades; decreased ability to attend to simultaneous bilateral visual stimuli; decreased proprioceptive and kinesthetic input in distal R LE for acceleration and braking, mikaela positioning and consistent safe negotiation of intersections on simulated assessments of driving. Recommendations: Recommend patient participate in  rehabilitation using simulation with patient to transition to the behind-wheel evaluation on-road in the [facility]) modified vehicle. Patient Response to Recommendations: Patient in agreement with participating in  rehabilitation.     Based on the findings of patient history, clinical presentation, evaluation, and decision making during this evaluation, this patient is of high complexity. Copy of evaluation sent to referring physician. Patient has been instructed to contact the referring physician to discuss the results of this evaluation. Patient has been informed that his or her physician is responsible for make the final decision regarding fitness to drive.     [facility]  [address] [facility], [address] [place] [address] [address]   [address], [address]  Phone: [phone]  Fax: [phone]

## 2022-09-14 ENCOUNTER — HOSPITAL ENCOUNTER (OUTPATIENT)
Dept: OCCUPATIONAL THERAPY | Age: 62
Setting detail: THERAPIES SERIES
Discharge: HOME | End: 2022-09-14
Payer: MEDICARE

## 2022-09-14 ENCOUNTER — HOSPITAL ENCOUNTER (OUTPATIENT)
Dept: PHYSICAL THERAPY | Age: 62
Setting detail: THERAPIES SERIES
Discharge: HOME | End: 2022-09-14
Payer: MEDICARE

## 2022-09-14 PROCEDURE — 97112 NEUROMUSCULAR REEDUCATION: CPT

## 2022-09-14 PROCEDURE — 97168 OT RE-EVAL EST PLAN CARE: CPT

## 2022-09-14 PROCEDURE — 97110 THERAPEUTIC EXERCISES: CPT

## 2022-09-14 ASSESSMENT — PAIN DESCRIPTION - DESCRIPTORS: DESCRIPTORS: TIGHTNESS;TINGLING

## 2022-09-14 ASSESSMENT — 9 HOLE PEG TEST
TEST_RESULT: FUNCTIONAL
TESTTIME_SECONDS: 23.46
TEST_RESULT: FUNCTIONAL
TESTTIME_SECONDS: 19.72

## 2022-09-14 ASSESSMENT — PAIN DESCRIPTION - LOCATION: LOCATION: ARM;NECK;LEG

## 2022-09-14 ASSESSMENT — PAIN DESCRIPTION - PAIN TYPE: TYPE: CHRONIC PAIN

## 2022-09-14 ASSESSMENT — PAIN DESCRIPTION - FREQUENCY: FREQUENCY: CONTINUOUS

## 2022-09-14 ASSESSMENT — PAIN DESCRIPTION - ORIENTATION: ORIENTATION: LEFT

## 2022-09-14 ASSESSMENT — PAIN SCALES - GENERAL: PAINLEVEL_OUTOF10: 5

## 2022-09-14 NOTE — PROGRESS NOTES
[address]         Phone: [phone]       Fax: [phone]     Occupational Therapy Discharge Note    Occupational Therapy: Daily Note   Patient: Almaz Bermudez (41 y.o. female)   Examination Date: 2022  No data recorded  Progress Note Counter: Next MD appt 2022     :  1960 # of Visits since [facility]:   30   MRN: [id number]  CSN: [id number] Start of Care Date:    Insurance: Payor: MEDICARE / Plan: MEDICARE PART A AND B / Product Type: *No Product type* /   Insurance ID: [id number] - (Medicare) Secondary Insurance (if applicable): Insurance Information:     Referring Physician: Jessenia Brody MD     PCP: Daly Bryant MD Visits to Date/Visits Approved:     No Show/Cancelled Appts:   /       Medical Diagnosis: Unspecified injury at unspecified level of cervical spinal cord, sequela [S14.109S]  Quadriplegia, C5-C7 incomplete [G82.54]  Cauda equina syndrome [G83.4]    Treatment Diagnosis: bilateral UE weakness & impaired coordination, bilateral shoulder stiffness, impaired sensation - hands lt more than rt  (M62.81, R27.9, M25.611, M25.612,R20.2        SUBJECTIVE EXAMINATION   Pain Level: Pain Screening  Patient Currently in Pain: Yes  Pain Assessment: 0-10  Pain Level: 5  Best Pain Level: 5  Post Treatment Pain Level: 5  Pain Type: Chronic pain  Pain Location: Arm, Neck, Leg  Pain Orientation: Left  Pain Descriptors: Tightness, Tingling  Pain Frequency: Continuous  Aggravating factors: Movement    Patient Comments: Subjective: \"I probably should have had this checked out sooner\" (Pt presents with moderate edema in RUE edema, mostly in her forearm. Pt states that ~1 month ago she noticed a little swelling but that it has significantly increased recently. Pt reports that she was instructed to follow-up with her PCP and she plans to call tomorrow.   OT avoided any repetitive movements/resistance exercises and instructed pt to hold off on doing any at home pending assessment from MD.  Pt verbalized Good understanding.)    HEP Compliance: Fair  Any changes to allergies, medications, or have you had any medical procedures/ER visits since your last visit?: No     OBJECTIVE EXAMINATION   Restrictions: Restrictions/Precautions: Fall Risk; General Precautions    Left AROM  Right AROM      LUE AROM (degrees)  LUE General AROM: Elbow/wrist WFL  L Shoulder Flexion 0-180: 146  L Shoulder ABduction 0-180: 138  L Shoulder Int Rotation  0-70: WFL  L Shoulder Ext Rotation  0-90: 84  Left Hand AROM (degrees)  Left Hand AROM: WFL RUE AROM (degrees)  R Shoulder Flexion 0-180: 145  R Shoulder ABduction 0-180: 140  R Shoulder Int Rotation  0-70: 62  Right Hand AROM (degrees)  Right Hand AROM: WFL       Left PROM  Right PROM      LUE PROM (degrees)  LUE PROM: WFL  Left Hand PROM (degrees)  Left Hand PROM: WFL RUE PROM (degrees)  RUE PROM: WFL  Right Hand PROM (degrees)  Right Hand PROM: WFL       Left Strength  Right Strength      LUE Strength  L Shoulder Flex: 5/5  L Shoulder ABduction: 5/5  L Shoulder Int Rotation: 5/5  L Shoulder Ext Rotation: 4+/5  L Elbow Flex: 5/5  L Elbow Ext: 5/5  L Forearm Pron: 5/5  L Forearm Sup: 5/5  L Wrist Flex: 4+/5  L Wrist Ext: 5/5    RUE Strength  R Shoulder Flex: 5/5  R Shoulder ABduction: 5/5  R Shoulder Int Rotation: 5/5  R Shoulder Ext Rotation: 4+/5  R Elbow Flex: 5/5  R Elbow Ext: 5/5  R Forearm Pron: 5/5  R Forearm Sup: 5/5  R Wrist Flex: 5/5  R Wrist Ext: 5/5          Hand Dominance: Right   Left  Right     Strength  Handle Setting 2: average 44. 33#  Trial 1: 43  Trial 2: 45  Trial 3: 45  Average: 44.33 Handle Setting 2: average 62.67#  Trial 1: 65  Trial 2: 64  Trial 3: 59  Average: 62.67   Pinch Strength (if applicable) Left Hand Strength - Lateral Pinch (lbs)  Trial 1: 17.5  Trial 2: 16  Trial 3: 14  Average: 15.83  Left Hand Strength - Tip (lbs)  Trial 1: 9  Trial 2: 8.5  Trial 3: 8.5  Average: 8.67 Right Hand Strength - Lateral Pinch (lbs)  Trial 1: 12  Trial 2: 13  Trial 3: 13.5  Average: 12.83  Right Hand Strength - Tip  (lbs)  Trial 1: 10  Trial 2: 11  Trial 3: 9  Average: 10     Fine Motor Skills:   Fine Motor Skills  Left 9-Hole Peg Test: Functional  Left 9 Hole Peg Test Time (secs): 23.46  Right 9-Hole Peg Test: Functional  Right 9 Hole Peg Test Time (secs): 19.72    The Upper Extremity Functional Index (UEFI)   22 1520   Today, do you or would you have any difficulty at all with: Any of your usual work, housework, or school activities: 3   Your usual hobbies, re creational or sporting activities: 2   Lifting a bag of groceries to waist level: 3   Lifting a bag of groceries above your head: 2   Grooming your hair: 2   Pushing up on your hands (eg from bathtub or chair): 4   Preparing food (eg peeling, cutting): 4   Drivin  (Just started 's rehab)   Vacuuming, sweeping or rakin  (Pt has not attempted 2* RW)   Dressin   Doing up buttons: 3   Using tools or appliances: 4   Opening doors: 4   Cleanin   Tying or lacing shoes: 2   Sleepin   Laundering clothes (eg washing, ironing, folding): 2   Opening a jar: 2  (with brand new jar; no difficulty after that)   Throwing a ball: 4   Carrying a small suitcase with your affected limb: 4  (Uses walker)   UEFI Total Score 55   UEFI Total Percentage 68.75 %         TREATMENT     Exercises:     Treatment Reasoning    OT Exercise 12: graded clothespins: place/remove  6 red 2#, 6 green 4#, 6 blue 6# ( using rt hand, using lt hand) - pt sitting                        Patient Education: OT educated pt on the discontinuation of regular OT services so that she can proceed with OT for 's rehab. OT also educated pt on avoiding any repetitive/resistive exercises with RUE until assessed by MD 2* recent onset of significant edema.   PT verbalized Good understanding of all education provided. ASSESSMENT     Assessment: Assessment: Discharge completed - all goals met or progressed, see goals for details. Performance deficits / Impairments: Decreased ROM, Decreased coordination, Decreased fine motor control, Decreased high-level IADLs, Decreased sensation    Post-Treatment Pain Level: 5    Prognosis: Good    Activity Tolerance: Patient tolerated treatment well             GOALS   Patient Goals   Patient goals : To use her arms /hands as best she can to function. Pt making progress  Short Term Goals Completed by 10 visits Current Status Goal Status   Pt will demo & verbalize independence with bilateral UE HEP Pt reports that she still uses her putty sometimes, but gets most of her UE exercise from household chores and laundry. Met   Pt will report improving independence & less difficulty with fine motor activities. Pt will complete 9 hole peg test 4 seconds quicker with rt hand, & 5 seconds quicker with lt hand. Met   Increase bilateral hand strength for daily activities: increase rt ( by 5# & tip pinch by 2#), increase lt ( by 5#, lateral & tip pinch by 2#)   Met   Increase bilateral shoulder functional AROM by 10 : lt shoulder (fleixon, abduction, IR,ER), & rt shoulder (flexion, abduction, ER ) to improve reaching for selfcare & reach into cabinets. Met             Long Term Goals Completed by 18 visits Current Status Goal Status    Using the UEFI pt will report a score of 3 (little difficulty) with doing light housework, hair grooming, lifting groceries to waist ht, cut/peeling food, getting dressing, button manipulation.  Goal met for all but hair grooming - remains moderate 2* difficulty putting barrettes in Partially met   Pt will verbalize increase sensory awareness with lt hand: increase sterognosis accuracy to 50% (identify 4 out of 8 objects correctly) Pt correctly identified 7/8 items, 88% Met   compared to eval increase PROM: rt shoulder flexion & IR by 10, lt shoulder flexion, abduction, IR by 10   Met   Increase bilateral UE strength to 4/5 : rt  UE shoulder (flexion,abduction, IR,ER),wrist & hand extension, lt UE shoulder (flexion,abduction,ER),supination,wrist (flexion,extension),& hand extension to improve strength for selfcare & light home task. Earline Casilals  4+/5 - 5/5 for all muscle groups Met   Compared to eval increase lt hand strength:  by 10# & tip pinch by 4, & improve lt hand fine motor dexterity by 10 seconds   Met        TREATMENT PLAN   OT Equipment Recommendations  Equipment Needed: No  REQUIRES OT FOLLOW-UP: No (Pt discharging from regular OT services and initiating OT for 's rehab.)  Type: Outpatient (Pt discharging from regular OT and initiating OT services for 's rehab)       Therapy Time  Individual Time In: 1520  Individual Time Out: 602 [address]  Minutes: 45    Electronically signed by Wanda Steve  on 9/14/2022 at 4:31 PM       Treatment Charges: Mins Units Time In/Out   [] Evaluation       []  Low       []  Moderate       []  High      []  Electrical Stim      []  Iontophoresis      []  Paraffin      []  Ultrasound        []  Masage      []  Neuromuscular Re-ed      []  ADL      []  Ther Exercise      []  Ther Activities      []  Neuro Re-Ed      []  Splinting      [x]  Other - Re-eval/Discharge 45 1 [id number]   Total Treatment time 45 min           POC NOTE   Electronically signed by СЕРГЕЙ Wheeler on 9/14/2022 at 4:37 PM

## 2022-09-14 NOTE — FLOWSHEET NOTE
[facility]   [address]   Phone: [phone]   Fax: [phone]    Physical Therapy Daily Treatment Note      Date:  2022  Patient Name:  Mayra Corley    :  1960  MRN: [id number]  Physician: Shabbir Savage MD                            Insurance: Medicare - based on [place] -- [facility] Diagnosis:   R67.616T (ICD-10-CM) - Spinal cord injury, cervical region, sequela ([facility] [address]   G82.54 (ICD-10-CM) - Incomplete quadriplegia at C5-6 level ([facility] [address]   G83.4 (ICD-10-CM) - Cauda equina syndrome ([facility] [address]   Rehab Codes: R25 pain , R53.1 weakness, Z74.0 reduced mobility, R29.6 high fall risk, R27.8 lack of coordination, R26.89 gait abnormality   Date of symptom onset: 21 -- cauda equina; 22 Cervical surgeries   Next 's appt.: 10/12/22 with Neurosurgery; 22 with PMR Dr. Delmy Owens   Total Visits: 32  Cx/Ns:1/0     Precautions: Can now wean out of cervical collar; not lifting greater than a gallon of milk:       Subjective:  Patient reports to therapy with 2WW. She notes she still needs to contact her PCP about the increased swelling and pitting edema in her RUE. Continues to deny pain with this. Pt arrives to session 10 minutes late after OT session.  Pt still not using her cane at home due to decreased confidence     Pain:  [x] Yes  [] No Location: Neck down to (B) Shoulders/UEs to knuckles in hands (B) LE's int feet and toes    Pain Rating: (0-10 scale) 5/10  Pain altered Tx:  [x] No  [] Yes  Action:  Comments:       Objective:   Exercises:  Exercise Reps/ Time Weight/ Level Completed  Today Comments   nustep  5' Level 5 x BLE/BUE           Cervical stretching        Upper trap stretch  2x30s ea    Therapist over pressure    Levator scap stretch  2x30s ea    Therapist overpressure    Rotation stretch  2x30s ea    Therapist overpressure    Doorway pec stretch  2x30s             Postural strengthening     Half roll behind in sitting for posture promotion. Seated rows  2x15 green      Seated horizontal ABD  2x15 3\" hold green     Seated B ER  2x15  green  With scap squeeze at end   Standing resisted rotations  2x10 ea  green  Good challenge for balance           seated    Post stretches burning pain reduces    HS stretch  2x30s ea  x    SKTC  2x30s ea  x           Standing       3 way stepping  2x10  x No UE support; CGA   -cues for larger steps      Eric steps overs  X6  X10   x UE support on cane   Limited by burning in the LE for first set    Modified SLS w/ SPC 30s EO  30s EC  x SBA   Alternating heel taps to step w/ SPC 2x20   x CGA          GAIT TRAINING        Ambulated throughout session with SPC  50ft   x180 ft   x During long walk has to take x2 standing rest breaks    Walking to  cones off floor and brining back to table  4 min total  5x cones   Ambulates about 10ft to/from cones with SPC, SBA    Ambulation without AD in front of mirror 10ft x6   CGA; No UE support, mirror for visual cues to improve heel/toe pattern and posture   Ambulation with SPC in grass 20ftx4   CGA, cues for step height and upright posture   Curb negotiation outside 4x   Up and down curb outside clinic, CGA and cues for sequencing. Other:   8/16: educated on the return to driving process and that she needs to talk with MD about this at next visit. Discussed from a PT stand point she has intact R side that would be sufficient for driving. Would want to get OT opinion on L hand use for turn signal. Another limiting factor is cervical ROM but feel this is improving and she would be ready for driving assessment. Specific Instructions for next treatment: Work on L stance control and weight translation to increase R step lengths, work on obstacle course with SPC, work on endurance     Assessment:    [x] Progressing toward goals. Initiated session with seferino to improve joint mobility prior to other activities.   Worked on increasing confidence with SPC to promote pt to use it more at home. Had her ambulate btwn activities in session with SPC. Limited in her standing tolerance due to increased \"burning in legs\". To reduce completed seated stretches to allow for continuation of session. Pt still limited by confidence to use SPC in the home. With session this date has no LOB but mild incoordination. Encouraged her to use the cane at home. With other interventions focused on stance stability to improve stepping control with gait. Again limited by confidence. By end of session pt noting fatigue in lower extremities. She still is limited at times due to decreased neuromuscular control, limited proprioception, LE pain, LE fatigue, and decreased motor control-- all which can be further improved with PT.          [] No change. [] Other:    [x] Patient would continue to benefit from skilled physical therapy services in order to: improve strength,mobiltiy and motor control that impacts balance and mobility. GOALS -- updated 8/16/22  Goals to Continue for POC:   Pt will demonstrate >10 SLS stability with light UE support to safely allow pt to enter/exit her tub shower. - 6/15: partially met -- light UE support for RLE, still difficulty on the L        - 7/21: progressing -- minUE support for stability on LLE    - 8/16: progressing -- minUE support still on LLE but able to go for longer time (15s)          3. Pt will improve time on TUG to <20s with LRAD at mod IND level to demonstrate decreased fall risk. - 6/15: progressing -- 10 second improvement to 25.5s         - 7/21: MET with 2WW reaching 15.7s; will continue goal to see if she can achieve with cane         - 8/16: progressing with SPC - 23.5 sec this date   Pt will increase gait speed to >0.8 m/s  With LRAD at mod I level to demonstrate decreased fall risk and optimize mobility to at community level.         - 6/15: limited increase in speed but pt is now mod IND with SPC with distant supervision to ensure She can safely navigate the community & get outdoors more. 10. New goal 8/16/22: Pt will be able to ambulate >500ft with 6mWT with SPC to demonstrate improved endurance and efficiency needed for community ambulation. Goals MET in POC:    Pt will improve score on ESCOBEDO balance scale by >/=6 points to improve overall balance stability and decrease fall risk with mobility. - 6/15: MET -- exceeds  Pt will be able to manage her walker up 1 step without instability to improve independence with access to her home. - 6/15: progressing   - 7/21: MET    Pt will improve time on 5xSTS to <15 seconds with use of 1 UE to decrease fall risk and increase functional capacity for mobility. - 6/15: completes with unilateral UE support this date but time is increased. - 7/21: progressing -- reaches 15.09s with 1 UE support   -8/16: MET -- 15s this date   Pt will improve score on OPTIMAL to 46/90 to demonstrate functional improvements with ADLs.   -6/15: will assess at a later time    - 7/21: will assess next session   - 8/16: MET 43/90 this date   Pt will report no falls for x6 weeks demonstrating improved safety with mobility and implementation of fall prevention strategies. - 6/15: meeting currently with no falls noted  - 7/21; MET -- no falls noted, verbalizes awareness of strategies to decrease falls   7. NEW GOAL 7/12: Pt will be able to maintain at least a neutral cervical alignment for > 75% of session showing improved cervical strength for keeping her head up to visualize her environment.              -7/21: progressing    -8/16: MET -- good alignment for all of session   8. NEW GOAL 7/12: Pt will demonstrate improved postural awareness with only minimal cues to correct posture throughout session to prevent undue stress to spine.               -7/21; Progressing         Patient stated Goals: to walk without a walker, improve balance     Pt.  Education:  [x] Yes  [] No  [x] Reviewed Prior HEP/Ed  Method of Education: [x] Verbal  [x] Demo  [] Written  Comprehension of Education:  [x] Verbalizes understanding. [] Demonstrates understanding. [] Needs review. [] Demonstrates/verbalizes HEP/Ed previously given. Access Code: [email or web address]  URL: "[email or web address]  Date: 08/26/2022  Prepared by: James Gracia    Exercises  Long Sitting Ankle Eversion with Resistance - 1 x daily - 7 x weekly - 3 sets - 10 reps  Long Sitting Ankle Plantar Flexion with Resistance - 1 x daily - 7 x weekly - 3 sets - 10 reps  Long Sitting Ankle Inversion with Resistance - 1 x daily - 7 x weekly - 3 sets - 10 reps  Long Sitting Ankle Dorsiflexion with Anchored Resistance - 1 x daily - 7 x weekly - 3 sets - 10 reps      PLAN:    [x] Continue per POC        Treatment Charges: Mins Units   []  Modalities     [x]  Ther Exercise 10 1   []  Manual Therapy     []  Ther Activities     []  Aquatics     [x]  Neuromuscular 32 2   [] Vasocompression     [] Gait Training     [] Dry needling        [] 1 or 2 muscles        [] 3 or more muscles     []  Other     Total Treatment time 42 3   * KX modifier needed*     Time In: 4:10 pm      Time Out: 4:52  pm      Electronically signed by:  Pavel Mercado PT

## 2022-09-15 ENCOUNTER — TELEPHONE (OUTPATIENT)
Dept: PRIMARY CARE CLINIC | Age: 62
End: 2022-09-15

## 2022-09-15 DIAGNOSIS — M79.89 ARM SWELLING: Primary | ICD-10-CM

## 2022-09-15 NOTE — TELEPHONE ENCOUNTER
Nurse Triage transfer    Patient called office c/o right arm swelling ongoing 1 mo. Patient states swelling comes and goes for 1 mo. Patient states this past week swelling not better. Patient denies pain, redness and itching. Patient states mastectomy on right side 31 years ago. Patient states her left arm is not as strong so she is needing to use right arm most of the time. Patient states she currently see PT for neck injury. Pt states she was told to follow up with PCP for this. Is this something that can wait until next available with PCP? Pt is only wanting to see      Or would you like to see patient sooner? Soonest available for you is not until October FYI.      Please advise

## 2022-09-15 NOTE — TELEPHONE ENCOUNTER
For right arm swelling, check venous u/s.  Order in  The swelling could be due to lymphedema  Make appt in October, unless it is getting worse then put in emergency sick call slot: check with [facility]

## 2022-09-18 ENCOUNTER — HOSPITAL ENCOUNTER (EMERGENCY)
Age: 62
Discharge: HOME | End: 2022-09-18
Attending: EMERGENCY MEDICINE
Payer: MEDICARE

## 2022-09-18 ENCOUNTER — APPOINTMENT (OUTPATIENT)
Dept: GENERAL RADIOLOGY | Age: 62
End: 2022-09-18
Payer: MEDICARE

## 2022-09-18 VITALS
SYSTOLIC BLOOD PRESSURE: 135 MMHG | DIASTOLIC BLOOD PRESSURE: 67 MMHG | OXYGEN SATURATION: 100 % | HEIGHT: 61 IN | TEMPERATURE: 98.3 F | BODY MASS INDEX: 25.49 KG/M2 | RESPIRATION RATE: 17 BRPM | WEIGHT: 135 LBS | HEART RATE: 75 BPM

## 2022-09-18 DIAGNOSIS — L03.113 CELLULITIS OF RIGHT UPPER EXTREMITY: ICD-10-CM

## 2022-09-18 DIAGNOSIS — M79.89 SWELLING OF ARM: Primary | ICD-10-CM

## 2022-09-18 LAB
ABSOLUTE EOS #: 0 K/UL (ref 0–0.4)
ABSOLUTE LYMPH #: 0.9 K/UL (ref 1–4.8)
ABSOLUTE MONO #: 0.4 K/UL (ref 0.1–1.3)
ALBUMIN SERPL-MCNC: 4.5 G/DL (ref 3.5–5.2)
ALP BLD-CCNC: 75 U/L (ref 35–104)
ALT SERPL-CCNC: 27 U/L (ref 5–33)
ANION GAP SERPL CALCULATED.3IONS-SCNC: 10 MMOL/L (ref 9–17)
AST SERPL-CCNC: 34 U/L
BASOPHILS # BLD: 1 % (ref 0–2)
BASOPHILS ABSOLUTE: 0 K/UL (ref 0–0.2)
BILIRUB SERPL-MCNC: 0.3 MG/DL (ref 0.3–1.2)
BUN BLDV-MCNC: 9 MG/DL (ref 8–23)
CALCIUM SERPL-MCNC: 9.1 MG/DL (ref 8.6–10.4)
CHLORIDE BLD-SCNC: 94 MMOL/L (ref 98–107)
CO2: 25 MMOL/L (ref 20–31)
CREAT SERPL-MCNC: 0.72 MG/DL (ref 0.5–0.9)
D-DIMER QUANTITATIVE: 0.61 MG/L FEU (ref 0–0.59)
EOSINOPHILS RELATIVE PERCENT: 1 % (ref 0–4)
GFR AFRICAN AMERICAN: >60 ML/MIN
GFR NON-AFRICAN AMERICAN: >60 ML/MIN
GFR SERPL CREATININE-BSD FRML MDRD: ABNORMAL ML/MIN/{1.73_M2}
GLUCOSE BLD-MCNC: 105 MG/DL (ref 70–99)
HCT VFR BLD CALC: 31.5 % (ref 36–46)
HEMOGLOBIN: 11.2 G/DL (ref 12–16)
LYMPHOCYTES # BLD: 17 % (ref 24–44)
MCH RBC QN AUTO: 32.7 PG (ref 26–34)
MCHC RBC AUTO-ENTMCNC: 35.6 G/DL (ref 31–37)
MCV RBC AUTO: 92 FL (ref 80–100)
MONOCYTES # BLD: 9 % (ref 1–7)
PDW BLD-RTO: 16.1 % (ref 11.5–14.9)
PLATELET # BLD: 300 K/UL (ref 150–450)
PMV BLD AUTO: 6.4 FL (ref 6–12)
POTASSIUM SERPL-SCNC: 4.3 MMOL/L (ref 3.7–5.3)
RBC # BLD: 3.43 M/UL (ref 4–5.2)
SEG NEUTROPHILS: 72 % (ref 36–66)
SEGMENTED NEUTROPHILS ABSOLUTE COUNT: 3.6 K/UL (ref 1.3–9.1)
SODIUM BLD-SCNC: 129 MMOL/L (ref 135–144)
TOTAL PROTEIN: 7.7 G/DL (ref 6.4–8.3)
WBC # BLD: 4.9 K/UL (ref 3.5–11)

## 2022-09-18 PROCEDURE — 85025 COMPLETE CBC W/AUTO DIFF WBC: CPT

## 2022-09-18 PROCEDURE — 93971 EXTREMITY STUDY: CPT

## 2022-09-18 PROCEDURE — 85379 FIBRIN DEGRADATION QUANT: CPT

## 2022-09-18 PROCEDURE — 73090 X-RAY EXAM OF FOREARM: CPT

## 2022-09-18 PROCEDURE — 6370000000 HC RX 637 (ALT 250 FOR IP): Performed by: EMERGENCY MEDICINE

## 2022-09-18 PROCEDURE — 80053 COMPREHEN METABOLIC PANEL: CPT

## 2022-09-18 PROCEDURE — 99284 EMERGENCY DEPT VISIT MOD MDM: CPT

## 2022-09-18 PROCEDURE — 36415 COLL VENOUS BLD VENIPUNCTURE: CPT

## 2022-09-18 RX ORDER — CEPHALEXIN 250 MG/1
500 CAPSULE ORAL ONCE
Status: COMPLETED | OUTPATIENT
Start: 2022-09-18 | End: 2022-09-18

## 2022-09-18 RX ORDER — CEPHALEXIN 500 MG/1
500 CAPSULE ORAL 4 TIMES DAILY
Qty: 28 CAPSULE | Refills: 0 | Status: SHIPPED | OUTPATIENT
Start: 2022-09-18 | End: 2022-09-25

## 2022-09-18 RX ADMIN — CEPHALEXIN 500 MG: 250 CAPSULE ORAL at 16:05

## 2022-09-18 ASSESSMENT — PAIN - FUNCTIONAL ASSESSMENT
PAIN_FUNCTIONAL_ASSESSMENT: NONE - DENIES PAIN
PAIN_FUNCTIONAL_ASSESSMENT: NONE - DENIES PAIN

## 2022-09-18 NOTE — ED NOTES
Discharge instructions reviewed with patient, noting all directions and education by provider. Patient verbalizes understanding of all information reviewed, gathered personal items, and transferred under own power off unit to lobby without incident.      Levi Kimble RN  95/14/61 [id number]

## 2022-09-18 NOTE — ED PROVIDER NOTES
EMERGENCY DEPARTMENT ENCOUNTER    Pt Name: Andres Mcmillan  MRN: [id number]  [place] 1960  Date of evaluation: 9/18/22  CHIEF COMPLAINT       Chief Complaint   Patient presents with    Arm Swelling     HISTORY OF PRESENT ILLNESS   HPI  Right arm swelling past few days, constant, getting worse, no pain, no trauma, hx of breast cancer many years ago, no fever, small red bump on top of arm looks like a blood vessel just showed up today      REVIEW OF SYSTEMS     Review of Systems   All other systems reviewed and are negative.   PASTMEDICAL HISTORY     Past Medical History:   Diagnosis Date    Anxiety     Arthritis     At maximum risk for fall 12/29/2021    caudal equina syndrome and cervical stenosis    Cancer (HCC)     right breast    Cauda equina syndrome ([facility] [address] 12/29/2021    neuropathy, mobility difficulty, incontinance    Cervical stenosis of spine 12/29/2021    GERD (gastroesophageal reflux disease)     History of stomach ulcers     Hypertension     Dr. Elyssa Mercer    Hypothyroidism     Lumbar neuritis     Post laminectomy syndrome     Spinal deformity 11/2021    cervical and lumbar    Thyroid disease     Uses wheelchair     Wears dentures     Wellness examination     Dr. Elyssa Mercer     Past Problem List  Patient Active Problem List   Diagnosis Code    Stenosis of cervical spine with myelopathy ([facility] [address] M48.02, G99.2    Essential hypertension I10    Hypothyroidism E03.9    Lumbar radiculopathy, chronic M54.16    Lumbar neuritis M54.16    Post laminectomy syndrome M96.1    Hypokalemia E87.6    Pain of right hip joint M25.551    Post-menopausal osteoporosis M81.0    Chronic gastritis without bleeding K29.50    Elevated CEA R97.0    Esophageal dysphagia R13.19    Hypotension due to drugs I95.2    Ulcerative esophagitis K22.10    Weight loss, abnormal R63.4    Cervical myelopathy (HCC) G95.9    Lumbar stenosis with neurogenic claudication M48.062    Spinal deformity Q76.49    Anxiety about health F41.8    Cauda equina 2027    HYDROCODONE-ACETAMINOPHEN (NORCO)  MG PER TABLET    Take 1 tablet by mouth every 6 hours as needed for Pain. LEVOTHYROXINE (SYNTHROID) 100 MCG TABLET    Take 1 tablet by mouth Daily    LISINOPRIL (PRINIVIL;ZESTRIL) 5 MG TABLET    Take 1 tablet by mouth daily    METHADONE (DOLOPHINE) 10 MG TABLET    Take 10 mg by mouth 2 times daily. PANTOPRAZOLE (PROTONIX) 40 MG TABLET    Take 1 tablet by mouth daily    VITAMIN D, ERGOCALCIFEROL, 50 MCG (2000 UT) CAPS    TAKE ONE CAPSULE BY MOUTH DAILY     ALLERGIES     is allergic to latex and kiwi extract. FAMILY HISTORY     She indicated that the status of her mother is unknown. SOCIAL HISTORY       Social History     Tobacco Use    Smoking status: Former     Packs/day: 0.50     Years: 30.00     Pack years: 15.00     Types: Cigarettes     Quit date:      Years since quittin.7    Smokeless tobacco: Never   Vaping Use    Vaping Use: Every day    Substances: Nicotine   Substance Use Topics    Alcohol use: Yes     Alcohol/week: 0.0 standard drinks     Comment: weekend at times    Drug use: Yes     Types: Prescription     Comment: [facility]adone from [facility]     PHYSICAL EXAM     INITIAL VITALS: /67   Pulse 75   Temp 98.3 °F (36.8 °C) (Oral)   Resp 17   Ht 5' 1\" (1.549 m)   Wt 135 lb (61.2 kg)   SpO2 100%   BMI 25.51 kg/m²    Physical Exam  Constitutional:       General: She is not in acute distress. Appearance: Normal appearance. She is well-developed. She is not diaphoretic. HENT:      Head: Normocephalic and atraumatic. Right Ear: External ear normal.      Left Ear: External ear normal.      Nose: Nose normal. No congestion. Mouth/Throat:      Mouth: Mucous membranes are moist.      Pharynx: Oropharynx is clear. Eyes:      General:         Right eye: No discharge. Left eye: No discharge. Conjunctiva/sclera: Conjunctivae normal.      Pupils: Pupils are equal, round, and reactive to light.    Neck: Trachea: No tracheal deviation. Cardiovascular:      Rate and Rhythm: Normal rate and regular rhythm. Pulses: Normal pulses. Heart sounds: Normal heart sounds. Pulmonary:      Effort: Pulmonary effort is normal. No respiratory distress. Breath sounds: Normal breath sounds. No stridor. No wheezing or rales. Abdominal:      Palpations: Abdomen is soft. Tenderness: There is no abdominal tenderness. There is no guarding or rebound. Musculoskeletal:         General: Swelling present. No tenderness or deformity. Normal range of motion. Cervical back: Normal range of motion and neck supple. Comments: Right forearm edema, no erythema, no purulence, nontender, full rom in right shoulder and elbow and wrist   Skin:     General: Skin is warm and dry. Capillary Refill: Capillary refill takes less than 2 seconds. Findings: No erythema or rash. Comments: 2 mm hemangioma mid right forearm   Neurological:      General: No focal deficit present. Mental Status: She is alert and oriented to person, place, and time. Coordination: Coordination normal.   Psychiatric:         Mood and Affect: Mood normal.         Behavior: Behavior normal.         Thought Content: Thought content normal.         Judgment: Judgment normal.       MEDICAL DECISION MAKING:       ED Course as of 09/18/22 1608   Sun Sep 18, 2022   1436 Vascular tech paged multiple times, house supervisor calls their cell phone, coming in to do scan now [WM]      ED Course User Index  [WM] Juan Poole MD       Procedures    DIAGNOSTIC RESULTS   RADIOLOGY:All plain film, CT, MRI, and formal ultrasound images (except ED bedside ultrasound) are read by the radiologist, see reports below, unless otherwisenoted in MDM or here. XR RADIUS ULNA RIGHT (2 VIEWS)   Final Result   Soft tissue swelling. No acute osseous abnormality. No radiopaque foreign   body.          VL DUP UPPER EXTREMITY VENOUS RIGHT    (Results Pending)     LABS: All lab results were reviewed by myself, and all abnormals are listed below. Labs Reviewed   CBC WITH AUTO DIFFERENTIAL - Abnormal; Notable for the following components:       Result Value    RBC 3.43 (*)     Hemoglobin 11.2 (*)     Hematocrit 31.5 (*)     RDW 16.1 (*)     Seg Neutrophils 72 (*)     Lymphocytes 17 (*)     Monocytes 9 (*)     Absolute Lymph # 0.90 (*)     All other components within normal limits   COMPREHENSIVE METABOLIC PANEL - Abnormal; Notable for the following components:    Glucose 105 (*)     Sodium 129 (*)     Chloride 94 (*)     AST 34 (*)     All other components within normal limits   D-DIMER, QUANTITATIVE - Abnormal; Notable for the following components:    D-Dimer, Quant 0.61 (*)     All other components within normal limits       EMERGENCY DEPARTMENTCOURSE:   Duplex neg for DVT  Putting on keflex in case this is cellulitis  Discussed with patient anticipatory guidance, discharge instructions, follow up PCP 48 hours        Vitals:    Vitals:    09/18/22 1111 09/18/22 1219 09/18/22 1530   BP: (!) 146/68 (!) 140/65 135/67   Pulse: 100 100 75   Resp: 17 17 17   Temp: 98.5 °F (36.9 °C) 98.3 °F (36.8 °C) 98.3 °F (36.8 °C)   TempSrc: Oral Oral Oral   SpO2: 97% 100% 100%   Weight: 135 lb (61.2 kg)     Height: 5' 1\" (1.549 m)         The patient was given the following medications while in the emergency department:  Orders Placed This Encounter   Medications    cephALEXin (KEFLEX) capsule 500 mg     Order Specific Question:   Antimicrobial Indications     Answer:   Skin and Soft Tissue Infection    cephALEXin (KEFLEX) 500 MG capsule     Sig: Take 1 capsule by mouth 4 times daily for 7 days     Dispense:  28 capsule     Refill:  0     FINAL IMPRESSION      1. Swelling of arm    2. Cellulitis of right upper extremity          DISPOSITION/PLAN   DISPOSITION Decision To Discharge 09/18/2022 03:59:12 PM      PATIENT REFERRED TO:  Kiko Mistry MD  [address]   [address] [facility] [address]  [phone]    Schedule an appointment as soon as possible for a visit in 2 days    DISCHARGE MEDICATIONS:  New Prescriptions    CEPHALEXIN (KEFLEX) 500 MG CAPSULE    Take 1 capsule by mouth 4 times daily for 7 days     The care is provided during an unprecedented national emergency due to the novel coronavirus, COVID 19.   MD Maryan Riddle MD  09/18/22 1099

## 2022-09-20 NOTE — TELEPHONE ENCOUNTER
Called pt's  and was able to speak with pt. Pt went to [facility] and was given an abx due to cellulitis. BENITO

## 2022-09-21 ENCOUNTER — HOSPITAL ENCOUNTER (OUTPATIENT)
Dept: OCCUPATIONAL THERAPY | Age: 62
Setting detail: THERAPIES SERIES
Discharge: HOME | End: 2022-09-21
Payer: MEDICARE

## 2022-09-21 ENCOUNTER — APPOINTMENT (OUTPATIENT)
Dept: OCCUPATIONAL THERAPY | Age: 62
End: 2022-09-21
Payer: MEDICARE

## 2022-09-21 ENCOUNTER — HOSPITAL ENCOUNTER (OUTPATIENT)
Dept: PHYSICAL THERAPY | Age: 62
Setting detail: THERAPIES SERIES
Discharge: HOME | End: 2022-09-21
Payer: MEDICARE

## 2022-09-21 PROCEDURE — 97110 THERAPEUTIC EXERCISES: CPT

## 2022-09-21 PROCEDURE — 97537 COMMUNITY/WORK REINTEGRATION: CPT

## 2022-09-21 PROCEDURE — 97112 NEUROMUSCULAR REEDUCATION: CPT

## 2022-09-21 NOTE — PROGRESS NOTES
[address] [facility]  Rehabilitation Services  Occupational Therapy  Rehabilitation Treatment Note  Date: 22  Patient Name: Mitchell Martinez    MRN: [id number]  Account: [de-identified]   : 1960  (65 y.o.) Gender: female     Insurance Information:  Medicare - [place] on [place] -- [facility]      OT Visit Information:  Visit #: 2     Physician: Kentrell Gonzalez MD                           Medical Diagnosis:   Q84.938O (ICD-10-CM) - Spinal cord injury, cervical region, sequela ([address]   G82.54 (ICD-10-CM) - Incomplete quadriplegia at C5-6 level ([address]   G83.4 (ICD-10-CM) - Cauda equina syndrome ([address]   Rehab Codes: R25 pain , R53.1 weakness, Z74.0 reduced mobility, R29.6 high fall risk, R27.8 lack of coordination  Date of symptom onset: 21 -- cauda equina; 22 Cervical surgeries   Next 's appt.: 10/12/22 with Neurosurgery; 22 with PMR Dr. Eric Aguiar      Reason for Referral: Spinal cord injury, cervical region, sequela ([address] Mitchell Martinez, 65 y.o., female (Nontraumatic cervical spinal cord injury). Patient with recent cervical spinal cord injury related to critical cervical stenosis who was admitted to [facility] for inpatient acute rehabilitation from 4/15/22 - 22. Patient c/o reduced endurance which is gradually improving with parasthesias in B hands feet. Patient currently being followed by Pain Management and has been followed for the past 11 yeas with patient currently prescribed Hydropcodone  every 6 hours & Methadone 2x/day. Patient with a history of anxiety approximately one year ago and is currently taking an anti-anxiety medication.      Subjective: Patient reporting concern regarding sensory impairment in R distal LE      Results of Initial  Evaluation 9473: Patient scores on clinical visual perceptual evaluation revealed mild impairment in dynamic vision in the areas of visual pursuits and saccades; decreased ability to attend to simultaneous bilateral visual stimuli; decreased proprioceptive and kinesthetic input in distal R LE for acceleration and braking, mikaela positioning and consistent safe negotiation of intersections on simulated assessments of driving. Results of Simulated Assessments w/Seat & Back Cushions (to maximize clearance over wheel due to decreased thoracic extension) 9/13/2022: Patient with decreased proprioceptive and kinesthetic input in distal R LE for acceleration and braking, mikaela positioning and consistent safe negotiation of intersections on simulated assessments of driving. Results of Simulated Assessments w/Seat & Back Cushions (to maximize clearance over wheel due to decreased thoracic extension) 9/21/2022:   Operational Skills              Primary Controls (accelerator, brake, steering wheel)  Secondary Controls (fastening/unfastening seatbelt; adjusting seat/steering wheel; starting the drive; turn signal; checking for traffic; &, operating gear shift): Modified independent with use of seat & back cushions     Divided Visual Attention Drive:     Pedal Control Drive:    Left Turns at [employer]:    Hazard Avoidance Drive:     Assessment: Patient scores on clinical visual perceptual evaluation revealed mild impairment in dynamic vision in the areas of visual pursuits and saccades; decreased ability to attend to simultaneous bilateral visual stimuli; decreased proprioceptive and kinesthetic input in distal R LE for acceleration and braking, miakela positioning and consistent safe negotiation of intersections on simulated assessments of driving. Plan: atient to transition to the behind-wheel evaluation on-road in the [address] modified vehicle.      [facility]  [address] [facility], [address] [facility] [address]   [address] [facility], [address]  Phone: [phone]          Fax: [phone]

## 2022-09-21 NOTE — PROGRESS NOTES
Evaluation 9/13/2022: Patient scores on clinical visual perceptual evaluation revealed mild impairment in dynamic vision in the areas of visual pursuits and saccades; decreased ability to attend to simultaneous bilateral visual stimuli; decreased proprioceptive and kinesthetic input in distal R LE for acceleration and braking, mikaela positioning and consistent safe negotiation of intersections on simulated assessments of driving. Results of Simulated Assessments w/Seat & Back Cushions (to maximize clearance over wheel due to decreased thoracic extension) 9/13/2022: Patient with decreased proprioceptive and kinesthetic input in distal R LE for acceleration and braking, mikaela positioning and consistent safe negotiation of intersections on simulated assessments of driving. Results of Simulated Assessments at Intermediate Level w/Seat & Back Cushions (to maximize clearance over wheel due to decreased thoracic extension) 9/21/2022:   Operational Skills              Primary Controls (accelerator, brake, steering wheel)  Secondary Controls (fastening/unfastening seatbelt; adjusting seat/steering wheel; starting the drive; turn signal; checking for traffic; &, operating gear shift): Modified independent with use of seat & back cushions     Divided Visual Attention Drive: Patient able to maintain speed throughout drive; patient crossed center 4x and off road 26% of drive time; patient able to visually attend to 10/10 visual stimuli throughout the drive with an average visual response speed of 2.4 sec, which is mildly delayed at an average speed of 41 mph. Pedal Control Drive: Patient able to avoid potential collisions throughout drive; patient cited for 1 traffic light throughout drive; patient over posted speed 7x and 10% of drive time; patient crossed center 2x and was out of mikaela 4.4% of drive time.  Patient able to safely negotiate 4/4 intersections at an average speed of 44 mph with an average pedal response speed of 0.6 sec which is [facility]. Left Turns at [employer]: Patient able to safely execute 3/3 left turns at intersections with traffic lights. Patient over posted speed 2x and 3% of drive time and crossed center 3x throughout drive. Steering Control Drive: Patient over posted speed 8x and 3% of drive time; patient crossed center 5x, off road 6x and out of mikaela 4% of drive time with patient able to safely negotiate 4/4 intersections throughout drive at an average speed of 44 mph. Hazard Avoidance Drive: Patient able to avoid all potential hazards throughout drive with patient able to control mikaela positioning and speed control throughout drive. Assessment: Patient scores on simulated tests of driving revealed decreased consistent adherence to traffic lights; decreased mikaela positioning and speed control and decreased visual processing speed. Plan: Will transition patient to simulated drives at the advanced competitive drive level next session followed by transition to the behind-wheel evaluation on-road in the [facility] modified vehicle.      [facility]  [address] [facility], [address] [place] [address] [address]   [address] [facility], [address]  Phone: [phone]          Fax: [phone]

## 2022-09-21 NOTE — FLOWSHEET NOTE
[address] [facility]   [address]   Phone: [phone]   Fax: [phone]    Physical Therapy Daily Treatment Note      Date:  2022  Patient Name:  Janet Sandra    :  1960  MRN: [id number]  Physician: Hernan Toledo MD                            Insurance: Medicare - based on [place] -- [address] [place] [address] Diagnosis:   X98.386T (ICD-10-CM) - Spinal cord injury, cervical region, sequela ([facility]   G82.54 (ICD-10-CM) - Incomplete quadriplegia at C5-6 level ([facility]   G83.4 (ICD-10-CM) - Cauda equina syndrome ([facility]   Rehab Codes: R25 pain , R53.1 weakness, Z74.0 reduced mobility, R29.6 high fall risk, R27.8 lack of coordination, R26.89 gait abnormality   Date of symptom onset: 21 -- cauda equina; 22 Cervical surgeries   Next 's appt.: 10/12/22 with Neurosurgery; 22 with PMR Dr. Etta Baumgarten   Total Visits: 32  Cx/Ns:1/0     Precautions: Can now wean out of cervical collar; not lifting greater than a gallon of milk:       Subjective:  Patient reports from OT 8 min late to PT appt. Patient states she went to the ER  due to being unable to get ahold of her PCP and the swelling kept increasing in her R UE. Patient states the ultrasound and xray were clear, and they diagnosed her with cellulitis and gave her an antibiotic. Patient states the swelling is decreasing.      Pain:  [x] Yes  [] No Location: Neck down to (B) Shoulders/UEs to knuckles in hands (B) LE's int feet and toes    Pain Rating: (0-10 scale) 6/10  Pain altered Tx:  [x] No  [] Yes  Action:  Comments:       Objective:   Exercises:  Exercise Reps/ Time Weight/ Level Completed  Today Comments   nustep  5' Level 5 x BLE/BUE           Cervical stretching        Upper trap stretch  2x30s ea    Therapist over pressure    Levator scap stretch  2x30s ea    Therapist overpressure    Rotation stretch  2x30s ea    Therapist overpressure    Doorway pec stretch  2x30s Postural strengthening     Half roll behind in sitting for posture promotion. Seated rows  2x15 green      Seated horizontal ABD  2x15 3\" hold green     Seated B ER  2x15  green  With scap squeeze at end   Standing resisted rotations  2x10 ea  green  Good challenge for balance           seated    Post stretches burning pain reduces    HS stretch  2x30s ea      SKTC  2x30s ea             Standing       3 way stepping  2x10   No UE support; CGA   -cues for larger steps      Eric steps overs  10x ea   x UE support on cane      Modified SLS w/ SPC 30s EO  30s EC  x CGA   Alternating heel taps to step w/ SPC 2x20   x CGA   HS and gastroc stretching 3x30'' ea  x Prior to gait training- pt stated she felt stiff. NBOS on foam with SPC EC 2x30''  x    Marching on foam with RUE on SPC 20x  x SPC on foam as well for greater challenge          GAIT TRAINING        Ambulation through gym with SPC  225ft, 75ft  x First walk after stretching, second walk at end of session. No rest breaks   Walking to  cones off floor and brining back to table  4 min total  5x cones   Ambulates about 10ft to/from cones with SPC, SBA    Ambulation without AD in front of mirror 10ft x6   CGA; No UE support, mirror for visual cues to improve heel/toe pattern and posture   Ambulation with SPC in grass 20ftx4   CGA, cues for step height and upright posture   Curb negotiation outside 4x   Up and down curb outside clinic, CGA and cues for sequencing. Other:   8/16: educated on the return to driving process and that she needs to talk with MD about this at next visit. Discussed from a PT stand point she has intact R side that would be sufficient for driving. Would want to get OT opinion on L hand use for turn signal. Another limiting factor is cervical ROM but feel this is improving and she would be ready for driving assessment.        Specific Instructions for next treatment: Work on L stance control and weight translation to increase R step lengths, work on obstacle course with SPC, work on endurance     Assessment:    [x] Progressing toward goals. Initiated session with nustep to improve endurance, joint mobility, and LE strength. Continued to parallel bars for gastroc and HS stretching as pt noted she felt very tight. Gait training with [address] focused on decreasing the sudden, full body tension d/t fear of falling and also focused on increasing R step length. As patient was walking, she relaxed a bit more and was able to correct her stiff posture. Patient unable to correct R step length and would become unsteady when cued to take a larger step with her R LE. Continued with balance activities and increasing L LE stability with addition of EC on foam, with SPC on foam as well for greater challenge. Upon completion of standing activities, continued with a shortened gait training, but applied more focus on correct gait mechanics vs. Improving endurance/balance. Patient required demonstration of her gait pattern with the decreased L stance time and decreased R step length in order to be able to correct it herself. Once patient was able to visually see her gait deficits, she was able to walk 75ft with the [address] and minimal cueing to increase her R step length. Encouraged pt to continue with HEP. [] No change. [] Other:    [x] Patient would continue to benefit from skilled physical therapy services in order to: improve strength,mobiltiy and motor control that impacts balance and mobility. GOALS -- updated 8/16/22  Goals to Continue for POC:   Pt will demonstrate >10 SLS stability with light UE support to safely allow pt to enter/exit her tub shower. - 6/15: partially met -- light UE support for RLE, still difficulty on the L        - 7/21: progressing -- minUE support for stability on LLE    - 8/16: progressing -- minUE support still on LLE but able to go for longer time (15s)          3.  Pt will improve time on TUG to <20s with LRAD at mod IND level to demonstrate decreased fall risk. - 6/15: progressing -- 10 second improvement to 25.5s         - 7/21: MET with 2WW reaching 15.7s; will continue goal to see if she can achieve with cane         - 8/16: progressing with SPC - 23.5 sec this date   Pt will increase gait speed to >0.8 m/s  With LRAD at mod I level to demonstrate decreased fall risk and optimize mobility to at community level. - 6/15: limited increase in speed but pt is now mod IND with 2WW        - 7/21: progressing-- significant improvement in gait speed with 2WW to 0.67m/s & 0.46m/s with [facility]    -8/16: progressing with use of SPC -- will assess formally in upcoming sessions. Pt will improve score on ESCOBEDO balance scale by >/= 45/56 points  to improve overall balance stability and decrease fall risk with mobility. - 6/15: progressing -- 33/56          - 7/21: progressing -- improves score ot 38/56   -8/16: progressing -- 44/56 greater improvement    Pt will increase strength of all major muscle groups of the LEs to 4/5 or greater demonstrating improved strength needed to maximize safety and efficiency with mobility tasks such as gait, transfers and stairs. - 6/15: progressing  -7/21; Progressing -- great progress made with LE strength, especially L ankle strength now all being above 3/5  - 8/16: progressing -- almost met but still working on L ankle eversion strength          3. Pt will be independent with home program addressing strength, flexibility and balance to maximize gains made in therapy to improve overall functional capacity for mobility.     -6/15: progressing -- will continue to update in POC  -7/21: progressing -- will continue to update in POC  -8/16: progressing -- will continue to update in POC, notes good compliance           4. Pt will improve score on OPTIMAL to 38/90 to demonstrate functional improvements with ADLs. 6. NEW GOAL 7/12:  Pt will improve AROM in of cervical ROM to >60 bilaterally to improve ability to visually scan while driving.              -7/21; Progressing    -8/16: progressing -- 52 deg to R this date; 48 deg to L this date   8. NEW GOAL 7/12: Pt will demonstrate improved postural awareness with only minimal cues to correct posture throughout session to prevent undue stress to spine.               -7/21; Progressing    -8/16: progressing   9. New goal 8/16/22: Pt will be able to navigate curbs, uneven surfaces, and inclines/declines with [facility] with distant supervision to ensure She can safely navigate the community & get outdoors more. 10. New goal 8/16/22: Pt will be able to ambulate >500ft with 6mWT with SPC to demonstrate improved endurance and efficiency needed for community ambulation. Goals MET in POC:    Pt will improve score on ESCOBEDO balance scale by >/=6 points to improve overall balance stability and decrease fall risk with mobility. - 6/15: MET -- exceeds  Pt will be able to manage her walker up 1 step without instability to improve independence with access to her home. - 6/15: progressing   - 7/21: MET    Pt will improve time on 5xSTS to <15 seconds with use of 1 UE to decrease fall risk and increase functional capacity for mobility. - 6/15: completes with unilateral UE support this date but time is increased. - 7/21: progressing -- reaches 15.09s with 1 UE support   -8/16: MET -- 15s this date   Pt will improve score on OPTIMAL to 46/90 to demonstrate functional improvements with ADLs.   -6/15: will assess at a later time    - 7/21: will assess next session   - 8/16: MET 43/90 this date   Pt will report no falls for x6 weeks demonstrating improved safety with mobility and implementation of fall prevention strategies. - 6/15: meeting currently with no falls noted  - 7/21; MET -- no falls noted, verbalizes awareness of strategies to decrease falls   7. NEW GOAL 7/12:  Pt will be able to maintain at least a neutral cervical alignment for >

## 2022-09-23 ENCOUNTER — APPOINTMENT (OUTPATIENT)
Dept: OCCUPATIONAL THERAPY | Age: 62
End: 2022-09-23
Payer: MEDICARE

## 2022-09-23 ENCOUNTER — HOSPITAL ENCOUNTER (OUTPATIENT)
Dept: PHYSICAL THERAPY | Age: 62
Setting detail: THERAPIES SERIES
Discharge: HOME | End: 2022-09-23
Payer: MEDICARE

## 2022-09-23 PROCEDURE — 97116 GAIT TRAINING THERAPY: CPT

## 2022-09-23 PROCEDURE — 97110 THERAPEUTIC EXERCISES: CPT

## 2022-09-23 NOTE — FLOWSHEET NOTE
[facility]   [address]   Phone: [phone]   Fax: [phone]    Physical Therapy Daily Treatment Note      Date:  2022  Patient Name:  Rene Gomez    :  1960  MRN: [id number]  Physician: Uriel Barber MD                            Insurance: [place] - based on [place] -- [address] [place] [address] Diagnosis:   S35.797V (ICD-10-CM) - Spinal cord injury, cervical region, sequela ([facility]   G82.54 (ICD-10-CM) - Incomplete quadriplegia at C5-6 level ([facility]   G83.4 (ICD-10-CM) - Cauda equina syndrome ([facility]   Rehab Codes: R25 pain , R53.1 weakness, Z74.0 reduced mobility, R29.6 high fall risk, R27.8 lack of coordination, R26.89 gait abnormality   Date of symptom onset: 21 -- cauda equina; 22 Cervical surgeries   Next 's appt.: 10/12/22 with Neurosurgery; 22 with PMR Dr. Sukhwinder Maria   Total Visits: 34/37  Cx/Ns:1/0     Precautions: Can now wean out of cervical collar; not lifting greater than a gallon of milk:       Subjective:  Patient reports having a headache today that she is unable to get rid of. Patient also states her arm swelling is not decreasing, but denies any skin color changes, burning, or any other symptoms. Patient also denies use of SPC at home, and is relying on only the 2WW.     Pain:  [x] Yes  [] No Location: Neck down to (B) Shoulders/UEs to knuckles in hands (B) LE's int feet and toes    Pain Rating: (0-10 scale) 6/10  Pain altered Tx:  [x] No  [] Yes  Action:  Comments:       Objective:   Exercises:  Exercise Reps/ Time Weight/ Level Completed  Today Comments   nustep  6' Level 5 x BLE/BUE           Cervical stretching        Upper trap stretch  2x30s ea    Therapist over pressure    Levator scap stretch  2x30s ea    Therapist overpressure    Rotation stretch  2x30s ea    Therapist overpressure    Doorway pec stretch  2x30s             Postural strengthening     Half roll behind in sitting for posture promotion. Seated rows  2x15 green      Seated horizontal ABD  2x15 3\" hold green     Seated B ER  2x15  green  With scap squeeze at end   Standing resisted rotations  2x10 ea  green  Good challenge for balance           seated    Post stretches burning pain reduces    HS stretch  2x30s ea  x    SKTC  2x30s ea  x           Standing       3 way stepping  2x10   No UE support; CGA   -cues for larger steps      Eric steps overs  10x ea    UE support on cane      Modified SLS w/ SPC 2x30s EO  2x30s EC  x CGA   Alternating heel taps to step w/ SPC 2x20    CGA   HS and gastroc stretching 3x30'' ea   Prior to gait training- pt stated she felt stiff. NBOS on foam with SPC EC 2x30''      Marching on foam with RUE on SPC 20x   SPC on foam as well for greater challenge          GAIT TRAINING        Ambulation through gym with SPC  318ft  x No rest breaks, cues for posture and heel/toe   Walking to  cones off floor and bringing back to table  4 min total  5x cones   Ambulates about 10ft to/from cones with SPC, SBA    Ambulation without AD in front of mirror 10ft x6   CGA; No UE support, mirror for visual cues to improve heel/toe pattern and posture   Ambulation with SPC in grass 20ftx4   CGA, cues for step height and upright posture   Curb negotiation outside 4x   Up and down curb outside clinic, CGA and cues for sequencing. Retro walking with SPC 2x20ft  x    Negotiating small spaces with SPC, picking up cones 7 cones  x Cones placed in tight spaces and on high surfaces to challenge balance    Fast walking with SPC 105ft  x 1 min 41 seconds   Other:   8/16: educated on the return to driving process and that she needs to talk with MD about this at next visit. Discussed from a PT stand point she has intact R side that would be sufficient for driving.  Would want to get OT opinion on L hand use for turn signal. Another limiting factor is cervical ROM but feel this is improving and she would be ready for driving assessment. Specific Instructions for next treatment: Work on L stance control and weight translation to increase R step lengths, work on obstacle course with SPC, work on endurance     Assessment:    [x] Progressing toward goals. Initiated session with seferino to improve endurance, joint mobility, and LE strength. Continued with main focus on gait training in order to improve gait mechanics with heel strike/toe off, decreasing ataxia, and improving the ability to negotiate different surface heights and ambulating through narrow spaces with SPC. Discussed importance of using SPC at home in order to build the strength and stability to ambulate within community. Added the activity of walking at a faster pace to challenge balance with SPC and endurance. No LOB noted with this activity, but was limited to 105ft due to interruption within hallway. With cone activity, cones were placed in narrow areas within the gym on the floor and at higher surfaces with objects surrounding on the floor in order to increase object/self/SPC negotiation and education given on safety with these challenges. [] No change. [] Other:    [x] Patient would continue to benefit from skilled physical therapy services in order to: improve strength,mobiltiy and motor control that impacts balance and mobility. GOALS -- updated 8/16/22  Goals to Continue for POC:   Pt will demonstrate >10 SLS stability with light UE support to safely allow pt to enter/exit her tub shower. - 6/15: partially met -- light UE support for RLE, still difficulty on the L        - 7/21: progressing -- minUE support for stability on LLE    - 8/16: progressing -- minUE support still on LLE but able to go for longer time (15s)          3. Pt will improve time on TUG to <20s with LRAD at mod IND level to demonstrate decreased fall risk.          - 6/15: progressing -- 10 second improvement to 25.5s         - 7/21: MET with 2WW reaching 15.7s; will continue goal to see if she can achieve with cane         - 8/16: progressing with SPC - 23.5 sec this date   Pt will increase gait speed to >0.8 m/s  With LRAD at mod I level to demonstrate decreased fall risk and optimize mobility to at community level. - 6/15: limited increase in speed but pt is now mod IND with 2WW        - 7/21: progressing-- significant improvement in gait speed with 2WW to 0.67m/s & 0.46m/s with [facility]    -8/16: progressing with use of SPC -- will assess formally in upcoming sessions. Pt will improve score on ESCOBEDO balance scale by >/= 45/56 points  to improve overall balance stability and decrease fall risk with mobility. - 6/15: progressing -- 33/56          - 7/21: progressing -- improves score ot 38/56   -8/16: progressing -- 44/56 greater improvement    Pt will increase strength of all major muscle groups of the LEs to 4/5 or greater demonstrating improved strength needed to maximize safety and efficiency with mobility tasks such as gait, transfers and stairs. - 6/15: progressing  -7/21; Progressing -- great progress made with LE strength, especially L ankle strength now all being above 3/5  - 8/16: progressing -- almost met but still working on L ankle eversion strength          3. Pt will be independent with home program addressing strength, flexibility and balance to maximize gains made in therapy to improve overall functional capacity for mobility.     -6/15: progressing -- will continue to update in POC  -7/21: progressing -- will continue to update in POC  -8/16: progressing -- will continue to update in POC, notes good compliance           4. Pt will improve score on OPTIMAL to 38/90 to demonstrate functional improvements with ADLs. 6. NEW GOAL 7/12: Pt will improve AROM in of cervical ROM to >60 bilaterally to improve ability to visually scan while driving.              -7/21; Progressing    -8/16: progressing -- 52 deg to R this date; 48 deg to L this date   8.  NEW GOAL 7/12: Pt will demonstrate improved postural awareness with only minimal cues to correct posture throughout session to prevent undue stress to spine.               -7/21; Progressing    -8/16: progressing   9. New goal 8/16/22: Pt will be able to navigate curbs, uneven surfaces, and inclines/declines with [facility] with distant supervision to ensure She can safely navigate the community & get outdoors more. 10. New goal 8/16/22: Pt will be able to ambulate >500ft with 6mWT with SPC to demonstrate improved endurance and efficiency needed for community ambulation. Goals MET in POC:    Pt will improve score on ESCOBEDO balance scale by >/=6 points to improve overall balance stability and decrease fall risk with mobility. - 6/15: MET -- exceeds  Pt will be able to manage her walker up 1 step without instability to improve independence with access to her home. - 6/15: progressing   - 7/21: MET    Pt will improve time on 5xSTS to <15 seconds with use of 1 UE to decrease fall risk and increase functional capacity for mobility. - 6/15: completes with unilateral UE support this date but time is increased. - 7/21: progressing -- reaches 15.09s with 1 UE support   -8/16: MET -- 15s this date   Pt will improve score on OPTIMAL to 46/90 to demonstrate functional improvements with ADLs.   -6/15: will assess at a later time    - 7/21: will assess next session   - 8/16: MET 43/90 this date   Pt will report no falls for x6 weeks demonstrating improved safety with mobility and implementation of fall prevention strategies. - 6/15: meeting currently with no falls noted  - 7/21; MET -- no falls noted, verbalizes awareness of strategies to decrease falls   7. NEW GOAL 7/12:  Pt will be able to maintain at least a neutral cervical alignment for > 75% of session showing improved cervical strength for keeping her head up to visualize her environment.              -7/21: progressing    -8/16: MET -- good alignment for all of session   8. NEW GOAL 7/12: Pt will demonstrate improved postural awareness with only minimal cues to correct posture throughout session to prevent undue stress to spine.               -7/21; Progressing         Patient stated Goals: to walk without a walker, improve balance     Pt. Education:  [x] Yes  [] No  [x] Reviewed Prior HEP/Ed  Method of Education: [x] Verbal  [] Demo  [] Written  Comprehension of Education:  [x] Verbalizes understanding. [] Demonstrates understanding. [] Needs review. [] Demonstrates/verbalizes HEP/Ed previously given. Access Code: [email or web address]  URL: [email or web address]  Date: 08/26/2022  Prepared by: Franky Arellano    Exercises  Long Sitting Ankle Eversion with Resistance - 1 x daily - 7 x weekly - 3 sets - 10 reps  Long Sitting Ankle Plantar Flexion with Resistance - 1 x daily - 7 x weekly - 3 sets - 10 reps  Long Sitting Ankle Inversion with Resistance - 1 x daily - 7 x weekly - 3 sets - 10 reps  Long Sitting Ankle Dorsiflexion with Anchored Resistance - 1 x daily - 7 x weekly - 3 sets - 10 reps      PLAN:    [x] Continue per POC        Treatment Charges: Mins Units   []  Modalities     [x]  Ther Exercise 10 1   []  Manual Therapy     []  Ther Activities     []  Aquatics     []  Neuromuscular     [] Vasocompression     [x] Gait Training 43 3   [] Dry needling        [] 1 or 2 muscles        [] 3 or more muscles     []  Other     Total Treatment time 53 4   * KX modifier needed*     Time In: 3:00 pm      Time Out: 3:53 pm      Electronically signed by:  Franky Arellano PTA

## 2022-09-26 ENCOUNTER — HOSPITAL ENCOUNTER (OUTPATIENT)
Dept: OCCUPATIONAL THERAPY | Age: 62
Setting detail: THERAPIES SERIES
Discharge: HOME | End: 2022-09-26
Payer: MEDICARE

## 2022-09-26 ENCOUNTER — APPOINTMENT (OUTPATIENT)
Dept: OCCUPATIONAL THERAPY | Age: 62
End: 2022-09-26
Payer: MEDICARE

## 2022-09-26 ENCOUNTER — HOSPITAL ENCOUNTER (OUTPATIENT)
Dept: PHYSICAL THERAPY | Age: 62
Setting detail: THERAPIES SERIES
Discharge: HOME | End: 2022-09-26
Payer: MEDICARE

## 2022-09-26 PROCEDURE — 97110 THERAPEUTIC EXERCISES: CPT

## 2022-09-26 PROCEDURE — 97116 GAIT TRAINING THERAPY: CPT

## 2022-09-26 PROCEDURE — 97537 COMMUNITY/WORK REINTEGRATION: CPT

## 2022-09-26 PROCEDURE — 97112 NEUROMUSCULAR REEDUCATION: CPT

## 2022-09-26 NOTE — PROGRESS NOTES
[address]  Rehabilitation Services  Occupational Therapy  Rehabilitation Treatment Note  Date: 22  Patient Name: Tania Oscar    MRN: [id number]  Account: [de-identified]   : 1960  (65 y.o.) Gender: female     Date: 22  Patient Name: Tania Oscar          MRN:   [id number]  Account: [de-identified]   : 1960  (65 y.o.) Gender: female      Insurance Information:  [place]ic[place] - [place] -- [facility]      OT Visit Information:  Visit #: 3     Physician: Arlene Munoz MD                           Medical Diagnosis:   D76.073O (ICD-10-CM) - Spinal cord injury, cervical region, sequela ([address]   G82.54 (ICD-10-CM) - Incomplete quadriplegia at C5-6 level ([address]   G83.4 (ICD-10-CM) - Cauda equina syndrome ([address]   Rehab Codes: R25 pain , R53.1 weakness, Z74.0 reduced mobility, R29.6 high fall risk, R27.8 lack of coordination  Date of symptom onset: 21 -- cauda equina; 22 Cervical surgeries   Next 's appt.: 10/12/22 with Neurosurgery; 22 with PMR Dr. Beasley Dire      Reason for Referral: Spinal cord injury, cervical region, sequela ([address] Tania Oscar, 65 y.o., female (Nontraumatic cervical spinal cord injury). Patient with recent cervical spinal cord injury related to critical cervical stenosis who was admitted to [facility] for inpatient acute rehabilitation from 4/15/22 - 22. Patient c/o reduced endurance which is gradually improving with parasthesias in B hands feet. Patient currently being followed by Pain Management and has been followed for the past 11 yeas with patient currently prescribed Hydropcodone  every 6 hours & Methadone 2x/day. Patient with a history of anxiety approximately one year ago and is currently taking an anti-anxiety medication.      Subjective: Patient reporting concern regarding sensory impairment in R distal LE; patient had an appointment with Pain Management today with patient continuing on Hydrocodone & Methadone for treatment of pain; patient taking oral Baclofen to control spasticity in bilateral calves. Results of Initial  Evaluation 2/80/7542: Patient scores on clinical visual perceptual evaluation revealed mild impairment in dynamic vision in the areas of visual pursuits and saccades; decreased ability to attend to simultaneous bilateral visual stimuli; decreased proprioceptive and kinesthetic input in distal R LE for acceleration and braking, mikaela positioning and consistent safe negotiation of intersections on simulated assessments of driving. Results of Simulated Assessments w/Seat & Back Cushions (to maximize clearance over wheel due to decreased thoracic extension) 9/21/2022:  Patient scores on simulated tests of driving revealed decreased consistent adherence to traffic lights; decreased mikaela positioning and speed control and decreased visual processing speed. Results of Simulated Assessments Intermediate-Advanced Level w/Seat & Back Cushions (to maximize clearance over wheel due to decreased thoracic extension) 9/26/2022:   Operational Skills              Primary Controls (accelerator, brake, steering wheel)  Secondary Controls (fastening/unfastening seatbelt; adjusting seat/steering wheel; starting the drive; turn signal; checking for traffic; &, operating gear shift): Modified independent with use of seat & back cushions    Pedal Control Drive: Patient cited for 1 traffic light; patient over posted speed 5x and 6% of drive time; patient off road 6% of drive; patient able to safely negotiate 4/4 intersections at an average speed of 43 mph with an average pedal response speed of 0.8 sec which is [facility] at an average speed of 44 mph. [place] Driving with [id number]: Patient over posted speed 2x and 2% of drive time; patient crossed center 5x and out of mikaela 11% of drive time with an average pedal response speed of 1.1 sec which is [facility].     Left Turns Across Traffic Drive: Patient safely completed 3/3 left turns at intersections with traffic lights with oncoming traffic and pedestrians crossing with patient over posted speed 2x and 10% of drive time. Patient crossed center 3x throughout drive time. Hazard Avoidance [address]: Patient with 1 collision throughout drive with patient able to safely negotiate 4/4 intersections with mel verage pedal response speed of 0.5 sec which is [facility]. Assessment: Patient scores on simulated tests of driving revealed decreased hazard avoidance skills, consistent adherence to traffic lights; decreased mikaela positioning and speed control and decreased visual processing speed. Plan: Will transition patient to simulated drives at the advanced competitive drive level with patient to transition to the behind-wheel evaluation on-road in the [facility]) modified vehicle.      [address] [place] [address]  [address] [facility], [address] [facility] [address]   [address], [address]  Phone: [phone]          Fax: [phone]    Electronically signed by Enrike Dunn OT on 9/26/22 at 10:48 AM EDT    Treatment Charges:  Minutes Units Time In-Out   Community Reintegration 60 4 01:45 pm - 2:45 pm   Total Treatment Time:  60 4

## 2022-09-26 NOTE — FLOWSHEET NOTE
ABD  2x15 3\" hold green     Seated B ER  2x15  green  With scap squeeze at end   Standing resisted rotations  2x10 ea  green  Good challenge for balance           seated    Post stretches burning pain reduces    HS stretch  2x30s ea  x    Piriformis stretch 2x30s ea  x           GAIT TRAINING        Ambulation through clinic with SPC  370ft  x No rest breaks, cues for posture and heel/toe   Walking to  cones off floor and bringing back to table  4 min total  5x cones   Ambulates about 10ft to/from cones with SPC, SBA    Ambulation without AD in front of mirror 10ft x6   CGA; No UE support, mirror for visual cues to improve heel/toe pattern and posture   Ambulation with SPC in grass 20ftx4   CGA, cues for step height and upright posture   Curb negotiation outside 4x   Up and down curb outside clinic, CGA and cues for sequencing. Retro walking with SPC 2x20ft      Negotiating small spaces with SPC, picking up cones 7 cones   Cones placed in tight spaces and on high surfaces to challenge balance    Fast walking with SPC ft   1 min 41 seconds   STS with 10ft walk and back 5x  x SBA   Ambulation with basil step overs/SPC 2 hurdles8 laps  x CGA, instructed to not stop in front of basil - continue walking and just step over. Obstacle with SPC 4 laps  x Basil, 4in step, basil   Ambulation with cervical rotation/SPC 30ft x2  x CGA   Cone weaves with SPC 7 cones x2  x    Other:   8/16: educated on the return to driving process and that she needs to talk with MD about this at next visit. Discussed from a PT stand point she has intact R side that would be sufficient for driving. Would want to get OT opinion on L hand use for turn signal. Another limiting factor is cervical ROM but feel this is improving and she would be ready for driving assessment.        Specific Instructions for next treatment: Work on L stance control and weight translation to increase R step lengths, work on obstacle course with SPC, work on endurance     Assessment:    [x] Progressing toward goals. Initiated session with seferino to improve joint mobility prior to gait training, as well as improving endurance. Focused today's session on gait training with SPC with addition of obstacles and ambulating with a more fluid gait pattern with these obstacles rather than stopping and negotiating. Patient tends to stop at obstacles and build up nervousness to the task, so an attempt to eliminate the negotiating was made. Patient had good response to this and demonstrated increased stability and balance when tasked to keep going and not stop. Patient demonstrated improvement with pivoting at cones to turn around and was able to complete cone weaves and STS/ambulation activity with SBA. One minor LOB with static standing rest break, but patient was able to recover and stabilize herself without assistance. With basil step overs, patient had significant improvement with stepping over and was able to decrease the amount of L ankle inversion, demonstrating improved control of her L LE. Reviewed safety within her home with [facility] use and will plan to have updated HEP at next session. [] No change. [] Other:    [x] Patient would continue to benefit from skilled physical therapy services in order to: improve strength,mobiltiy and motor control that impacts balance and mobility. GOALS -- updated 8/16/22  Goals to Continue for POC:   Pt will demonstrate >10 SLS stability with light UE support to safely allow pt to enter/exit her tub shower. - 6/15: partially met -- light UE support for RLE, still difficulty on the L        - 7/21: progressing -- minUE support for stability on LLE    - 8/16: progressing -- minUE support still on LLE but able to go for longer time (15s)          3. Pt will improve time on TUG to <20s with LRAD at mod IND level to demonstrate decreased fall risk.          - 6/15: progressing -- 10 second improvement to 25.5s         - 7/21: MET with 2WW reaching 15.7s; will continue goal to see if she can achieve with cane         - 8/16: progressing with SPC - 23.5 sec this date   Pt will increase gait speed to >0.8 m/s  With LRAD at mod I level to demonstrate decreased fall risk and optimize mobility to at community level. - 6/15: limited increase in speed but pt is now mod IND with 2WW        - 7/21: progressing-- significant improvement in gait speed with 2WW to 0.67m/s & 0.46m/s with [address]    -8/16: progressing with use of SPC -- will assess formally in upcoming sessions. Pt will improve score on ESCOBEDO balance scale by >/= 45/56 points  to improve overall balance stability and decrease fall risk with mobility. - 6/15: progressing -- 33/56          - 7/21: progressing -- improves score ot 38/56   -8/16: progressing -- 44/56 greater improvement    Pt will increase strength of all major muscle groups of the LEs to 4/5 or greater demonstrating improved strength needed to maximize safety and efficiency with mobility tasks such as gait, transfers and stairs. - 6/15: progressing  -7/21; Progressing -- great progress made with LE strength, especially L ankle strength now all being above 3/5  - 8/16: progressing -- almost met but still working on L ankle eversion strength          3. Pt will be independent with home program addressing strength, flexibility and balance to maximize gains made in therapy to improve overall functional capacity for mobility.     -6/15: progressing -- will continue to update in POC  -7/21: progressing -- will continue to update in POC  -8/16: progressing -- will continue to update in POC, notes good compliance           4. Pt will improve score on OPTIMAL to 38/90 to demonstrate functional improvements with ADLs. 6. NEW GOAL 7/12:  Pt will improve AROM in of cervical ROM to >60 bilaterally to improve ability to visually scan while driving.              -7/21; Progressing    -8/16: progressing -- 52 deg to R this date; 48 deg to L this date   8. NEW GOAL 7/12: Pt will demonstrate improved postural awareness with only minimal cues to correct posture throughout session to prevent undue stress to spine.               -7/21; Progressing    -8/16: progressing   9. New goal 8/16/22: Pt will be able to navigate curbs, uneven surfaces, and inclines/declines with [facility] with distant supervision to ensure She can safely navigate the community & get outdoors more. 10. New goal 8/16/22: Pt will be able to ambulate >500ft with 6mWT with SPC to demonstrate improved endurance and efficiency needed for community ambulation. Goals MET in POC:    Pt will improve score on ESCOBEDO balance scale by >/=6 points to improve overall balance stability and decrease fall risk with mobility. - 6/15: MET -- exceeds  Pt will be able to manage her walker up 1 step without instability to improve independence with access to her home. - 6/15: progressing   - 7/21: MET    Pt will improve time on 5xSTS to <15 seconds with use of 1 UE to decrease fall risk and increase functional capacity for mobility. - 6/15: completes with unilateral UE support this date but time is increased. - 7/21: progressing -- reaches 15.09s with 1 UE support   -8/16: MET -- 15s this date   Pt will improve score on OPTIMAL to 46/90 to demonstrate functional improvements with ADLs.   -6/15: will assess at a later time    - 7/21: will assess next session   - 8/16: MET 43/90 this date   Pt will report no falls for x6 weeks demonstrating improved safety with mobility and implementation of fall prevention strategies. - 6/15: meeting currently with no falls noted  - 7/21; MET -- no falls noted, verbalizes awareness of strategies to decrease falls   7. NEW GOAL 7/12:  Pt will be able to maintain at least a neutral cervical alignment for > 75% of session showing improved cervical strength for keeping her head up to visualize her environment.              -7/21: progressing -8/16: MET -- good alignment for all of session   8. NEW GOAL 7/12: Pt will demonstrate improved postural awareness with only minimal cues to correct posture throughout session to prevent undue stress to spine.               -7/21; Progressing         Patient stated Goals: to walk without a walker, improve balance     Pt. Education:  [x] Yes  [] No  [x] Reviewed Prior HEP/Ed  Method of Education: [x] Verbal  [] Demo  [] Written  Comprehension of Education:  [x] Verbalizes understanding. [] Demonstrates understanding. [] Needs review. [] Demonstrates/verbalizes HEP/Ed previously given. Access Code: [email or web address]  URL: [email or web address]  Date: 08/26/2022  Prepared by: Sylvie Marcos    Exercises  Long Sitting Ankle Eversion with Resistance - 1 x daily - 7 x weekly - 3 sets - 10 reps  Long Sitting Ankle Plantar Flexion with Resistance - 1 x daily - 7 x weekly - 3 sets - 10 reps  Long Sitting Ankle Inversion with Resistance - 1 x daily - 7 x weekly - 3 sets - 10 reps  Long Sitting Ankle Dorsiflexion with Anchored Resistance - 1 x daily - 7 x weekly - 3 sets - 10 reps      PLAN:    [x] Continue per POC        Treatment Charges: Mins Units   []  Modalities     [x]  Ther Exercise 10 1   []  Manual Therapy     []  Ther Activities     []  Aquatics     [x]  Neuromuscular 34 2   [] Vasocompression     [x] Gait Training 10 1   [] Dry needling        [] 1 or 2 muscles        [] 3 or more muscles     []  Other     Total Treatment time 54 4   * KX modifier needed*     Time In: 2:40 pm      Time Out: 3:34 pm      Electronically signed by:  Sylvie Marcos PTA

## 2022-09-28 ENCOUNTER — HOSPITAL ENCOUNTER (OUTPATIENT)
Dept: PHYSICAL THERAPY | Age: 62
Setting detail: THERAPIES SERIES
Discharge: HOME | End: 2022-09-28
Payer: MEDICARE

## 2022-09-28 ENCOUNTER — APPOINTMENT (OUTPATIENT)
Dept: OCCUPATIONAL THERAPY | Age: 62
End: 2022-09-28
Payer: MEDICARE

## 2022-09-28 PROCEDURE — 97530 THERAPEUTIC ACTIVITIES: CPT

## 2022-09-28 PROCEDURE — 97116 GAIT TRAINING THERAPY: CPT

## 2022-09-28 NOTE — PROGRESS NOTES
[facility]   [address]   Phone: [phone]   Fax: [phone]    Physical Therapy Daily Treatment Note/Progress Note       Date:  2022  Patient Name:  Dodie Galaviz    :  1960  MRN: [id number]  Physician: Susan Grace MD                            Insurance: Medicare - based on [place] -- [facility] Diagnosis:   G72.692S (ICD-10-CM) - Spinal cord injury, cervical region, sequela ([facility] [address]   G82.54 (ICD-10-CM) - Incomplete quadriplegia at C5-6 level ([facility] [address]   G83.4 (ICD-10-CM) - Cauda equina syndrome ([facility] [address]   Rehab Codes: R25 pain , R53.1 weakness, Z74.0 reduced mobility, R29.6 high fall risk, R27.8 lack of coordination, R26.89 gait abnormality   Date of symptom onset: 21 -- cauda equina; 22 Cervical surgeries   Next 's appt.: 10/12/22 with Neurosurgery; 22 with PMR Dr. Nirav De La Cruz   Total Visits: 36/37  Cx/Ns:0   Date of initial visit: 22        Date of PN: 22 (visit 20); 22 (visit 27); 22 (visit 39)      Formal progress note reporting period:  22 - 22        Precautions: Can now wean out of cervical collar; not lifting greater than a gallon of milk:       Subjective:  Patient arrives with her 2WW. She feels that she is doing her SPC more at home. Is being more consistent with it. Denies any falls. She notes that her posture is still improving. She is able better control and not get sensation of head fall down. She notes her main goals continue to be walking confidently with SPC at all times. She also has not been to run her vacuum yet as she still needs something to hold on to.        Pain:  [x] Yes  [] No Location: Neck down to (B) Shoulders/UEs to knuckles in hands (B) LE's int feet and toes    Pain Rating: (0-10 scale) 6/10  Pain altered Tx:  [x] No  [] Yes  Action:  Comments:       Objective:  Tests & measures 22      CERVICAL ROM  degrees  22 Degrees   22 Degrees  9/28/22  comments   Cervical flexion 30 48 38   getting tighter    Cervical extension 22 28 30     R Cv sidebending  20 30 18  getting tighter    L Cv sidebending 22 26 24     R Cv rotation 45 52 65     L Cv rotation 30 48 50        GENERAL COMMENTS:   - gait: in session ambulating with SPC with distant supervision, limited heel strike. Decreased L vs R step length. Fatigues easily      Measures 5/10/22  6/15/22 7/21/22  8/16/22  9/28/22    ESCOBEDO 19/56  33/56 38/56 (5 pt improvement)  44/56 44/56   5xSTS 19. .5   27.46s -- with only 1 UE support  37.09s w/o UE support  15.09s w/ 1UE support     23.48s w/o UE support   15.0s with 1 UE support  Difficulty this date without UE support   16.04s with 1 UE support    10mWT 0.48m/s  20.35s >> 0.49 m/s with 2WW  15.7s with 2WW >> 0.67m/s  21.75s with quad cane >> 0.46m/s    19.8s with SPC >> 0.5 m/s    Endurance   4:30 with 2WW, reaching 380ft  Will assess next session (460ft) with quad cane CGA 262ft with SPC, SBA  440ft with SPC, supervision   TUG 35.5 s with 2WW, CGA   25.5s with 2WW, mod IND  16.5s with 2WW  23.0s with quad cane 27.9s  & 23.4s with SPC  18.61s with SPC   SLS L    w/o UE support 2s  W/ mod UE support 10s w/o UE support 2s  W/ min UE support 10s W/o UE support 2s   W/ min UE support 15  W/o UE support 3s   SLS R    wlo UE support 2s  W/ light UE support 10s wlo UE support 3s  W/ light UE support 20s  W/o UE support 4s  W/o UE support 7 s    Optimal 56/90   43/90 39/90 (4pt improvement)      Exercises/Interventions   - Gait training in alter-g (medium shorts): cues through out for    -- 90% BWS, 2x3 minute bouts    -- bout 1) 1.0-1.2 mph    -- bout 2) 1.4-1.5 mph with much more fluid stepping   Assessed parameters throughout: 10% Wbing difference R>L; ~3 in step length difference R>L     Exercise Reps/ Time Weight/ Level Completed  Today Comments                 Other:         Specific Instructions for next treatment: work on treadmill for high intensity gait training -- in alter g to lite gait. Focus on gait parameters to get equal step lengths and Wbing; stance control interventions     Assessment:    [x] Progressing toward goals. Patient was initially evaluated on 5/22/22 to address mobility deficits post cauda equina injury and cervical myleopathy. Has completed x36 total visits focusing on improving mobility & posture in a multi-faceted POC. Since last progress note pt has been more stable and consistent with using her SPC. Still limited in use outside of clinic but pt is now using it consistently at home. Want to see her arrive to sessions with [facility] as this will help with massed practice and building endurance. She is getting faster as her TUG time improves to 18.61s which is less than fall risk cut off score & 5 second improvement. She also is improving in gait speed reaching 0.5m/s with SPC. Will want this to improve to 0.8 m/s to be at level of community ambulator. Her distance on 6mWT significantly improves by 178ft with SPC  with supervision. Still noting significant fatigue and LE soreness post 6 minutes. Will want to improve pt's tolerance to ambulation for longer time and distances to ensure she can navigate at a community level. Other measures such as balance and 5xSTS are reaching plateaus which was discussed with patient. She does have improved score on modified OPTIMAL indicating she feels her overall function is improving. Pt is also tighter in her cervical spine as focus has been on her ambulation. Feel she is making good progress toward ambulation goals as this is most meaningful to the patient. Feel she continues to demonstrate improvement and potential to improve to ensure she can be as safe and efficient with community amubulation. Will extend POC by x10 visits reaching 47 overall visits as progress is still to be expected and needed to ensure she is safe and functioning at the highest functional level as possible.      [] No change. [] Other:    [x] Patient would continue to benefit from skilled physical therapy services in order to: improve strength,mobiltiy and motor control that impacts balance and mobility. GOALS -- updated 8/16/22 & 9/28/22   Goals to Continue for POC:   3. Pt will increase gait speed to >0.8 m/s  With LRAD at mod I level to demonstrate decreased fall risk and optimize mobility to at community level. - 6/15: limited increase in speed but pt is now mod IND with 2WW        - 7/21: progressing-- significant improvement in gait speed with 2WW to 0.67m/s & 0.46m/s with [facility]    -8/16: progressing with use of SPC -- will assess formally in upcoming sessions.    -9/28: progressing -- 0.5 m/s with [facility] distant supervision   4. Pt will improve score on ESCOBEDO balance scale by >/= 45/56 points  to improve overall balance stability and decrease fall risk with mobility. - 6/15: progressing -- 33/56          - 7/21: progressing -- improves score ot 38/56   -8/16: progressing -- 44/56 greater improvement    -9/28: plateau in progress at 44/56  5. Pt will increase strength of all major muscle groups of the LEs to 4/5 or greater demonstrating improved strength needed to maximize safety and efficiency with mobility tasks such as gait, transfers and stairs. - 6/15: progressing  -7/21; Progressing -- great progress made with LE strength, especially L ankle strength now all being above 3/5  - 8/16: progressing -- almost met but still working on L ankle eversion strength   -9/28: progressing -- will formally assess at later session   6.  Pt will be independent with home program addressing strength, flexibility and balance to maximize gains made in therapy to improve overall functional capacity for mobility.     -6/15: progressing -- will continue to update in POC  -7/21: progressing -- will continue to update in POC  -8/16: progressing -- will continue to update in POC, notes good compliance   - 9/28: progressing -- will continue to update in POC, notes good compliance    7. Pt will improve score on OPTIMAL to 38/90 to demonstrate functional improvements with ADLs.     -9/28: progressing -- 39/90    8. NEW GOAL 7/12: Pt will improve AROM in of cervical ROM to >60 bilaterally to improve ability to visually scan while driving.              -7/21; Progressing    -8/16: progressing -- 52 deg to R this date; 48 deg to L this date    - 9/28: progressing -- reaches to the R, limited on L   9. New goal 8/16/22: Pt will be able to navigate curbs, uneven surfaces, and inclines/declines with [facility] with distant supervision to ensure She can safely navigate the community & get outdoors more. -9/28: progressing -- completes with SBA in sessions   10. New goal 8/16/22: Pt will be able to ambulate >500ft with 6mWT with SPC to demonstrate improved endurance and efficiency needed for community ambulation.    - 9/28: progressing -- reaches 440ft with SPC (178ft improvement)   11. NEW GOAL 9/28/22: Pt will be able to ambulate for 10 minutes straight with fluid gait pattern to demonstrate good endurance needed to optimize community ambulation such as at grocery store or for visiting family. Goals MET in POC:   Pt will demonstrate >10 SLS stability with light UE support to safely allow pt to enter/exit her tub shower. - 6/15: partially met -- light UE support for RLE, still difficulty on the L        - 7/21: progressing -- minUE support for stability on LLE    - 8/16: progressing -- minUE support still on LLE but able to go for longer time (15s)    - 9/28: MET -- pt has been able to get in/out of her tub          3. Pt will improve time on TUG to <20s with LRAD at mod IND level to demonstrate decreased fall risk.          - 6/15: progressing -- 10 second improvement to 25.5s         - 7/21: MET with 2WW reaching 15.7s; will continue goal to see if she can achieve with cane         - 8/16: progressing with SPC - 23.5 sec this date -9/28: MET -- 18.61s with SPC mod IND   8. NEW GOAL 7/12: Pt will demonstrate improved postural awareness with only minimal cues to correct posture throughout session to prevent undue stress to spine.               -7/21; Progressing    -8/16: progressing    - 9/28: MET       Patient stated Goals: to walk without a walker, improve balance     Pt. Education:  [x] Yes  [] No  [x] Reviewed Prior HEP/Ed  Method of Education: [x] Verbal  [] Demo  [] Written  Comprehension of Education:  [x] Verbalizes understanding. [] Demonstrates understanding. [] Needs review. [] Demonstrates/verbalizes HEP/Ed previously given. Access Code: [employer]  URL: [email or web address]  Date: 08/26/2022  Prepared by: Gómez Horne    Exercises  Long Sitting Ankle Eversion with Resistance - 1 x daily - 7 x weekly - 3 sets - 10 reps  Long Sitting Ankle Plantar Flexion with Resistance - 1 x daily - 7 x weekly - 3 sets - 10 reps  Long Sitting Ankle Inversion with Resistance - 1 x daily - 7 x weekly - 3 sets - 10 reps  Long Sitting Ankle Dorsiflexion with Anchored Resistance - 1 x daily - 7 x weekly - 3 sets - 10 reps      PLAN:    [x] see below       Treatment Plan:  [x] Therapeutic Exercise   84572  [] Iontophoresis: 4 mg/mL Dexamethasone Sodium Phosphate  mAmin  32995   [x] Therapeutic Activity  54497 [] Vasopneumatic cold with compression  54570    [x] Gait Training   96453 [] Ultrasound   21127   [x] Neuromuscular Re-education  14134 [] Electrical Stimulation Unattended  05005   [x] Manual Therapy  56570 [] Electrical Stimulation Attended  12817   [x] Instruction in HEP  [] Lumbar/Cervical Traction  94138   [] Aquatic Therapy   95348 [] Cold/hotpack    [] Massage   71273      [] Dry Needling, 1 or 2 muscles  68160   [] Biofeedback, first 15 minutes   39895  [] Biofeedback, additional 15 minutes   39430 [] Dry Needling, 3 or more muscles  90465      Patient Status:     [] Continue per initial plan of care.     [x] Additional visits necessary. -- x10 visits to reach a total of 47 visits  as pt continues to have goals and make progress     [] Other:     Requested Frequency/Duration: 1-2 times per week for x10 additional visits to reach a total of 47 treatments.                   Treatment Charges: Mins Units   []  Modalities     []  Ther Exercise     []  Manual Therapy     [x]  Ther Activities 42 3   []  Aquatics     []  Neuromuscular     [] Vasocompression     [x] Gait Training 15 1   [] Dry needling        [] 1 or 2 muscles        [] 3 or more muscles     []  Other     Total Treatment time 57 4   * KX modifier needed*     Time In: 2:05 pm      Time Out: 3:02 pm      Electronically signed by:  Eric Gomez PT

## 2022-10-04 ENCOUNTER — HOSPITAL ENCOUNTER (OUTPATIENT)
Dept: PHYSICAL THERAPY | Age: 62
Setting detail: THERAPIES SERIES
Discharge: HOME | End: 2022-10-04
Payer: MEDICARE

## 2022-10-04 ENCOUNTER — HOSPITAL ENCOUNTER (OUTPATIENT)
Dept: GENERAL RADIOLOGY | Facility: CLINIC | Age: 62
Discharge: HOME | End: 2022-10-06
Payer: MEDICARE

## 2022-10-04 ENCOUNTER — HOSPITAL ENCOUNTER (OUTPATIENT)
Age: 62
Setting detail: SPECIMEN
Discharge: HOME | End: 2022-10-04

## 2022-10-04 ENCOUNTER — HOSPITAL ENCOUNTER (OUTPATIENT)
Facility: CLINIC | Age: 62
Discharge: HOME | End: 2022-10-06
Payer: MEDICARE

## 2022-10-04 DIAGNOSIS — G99.2 STENOSIS OF CERVICAL SPINE WITH MYELOPATHY (HCC): ICD-10-CM

## 2022-10-04 DIAGNOSIS — M48.02 STENOSIS OF CERVICAL SPINE WITH MYELOPATHY (HCC): ICD-10-CM

## 2022-10-04 DIAGNOSIS — E03.8 OTHER SPECIFIED HYPOTHYROIDISM: ICD-10-CM

## 2022-10-04 DIAGNOSIS — M43.8X2 SWAN NECK DEFORMITY OF CERVICAL SPINE: ICD-10-CM

## 2022-10-04 LAB — TSH SERPL DL<=0.05 MIU/L-ACNC: 0.59 UIU/ML (ref 0.3–5)

## 2022-10-04 PROCEDURE — 97110 THERAPEUTIC EXERCISES: CPT

## 2022-10-04 PROCEDURE — 97116 GAIT TRAINING THERAPY: CPT

## 2022-10-04 PROCEDURE — 72040 X-RAY EXAM NECK SPINE 2-3 VW: CPT

## 2022-10-04 NOTE — FLOWSHEET NOTE
[facility]   [address]   Phone: [phone]   Fax: [phone]    Physical Therapy Daily Treatment Note      Date:  10/4/2022  Patient Name:  Jose Green    :  1960  MRN: [id number]  Physician: Bhavani Weems MD                            Insurance: [place] - based on [place] -- [address] [place] [address] Diagnosis:   S86.312S (ICD-10-CM) - Spinal cord injury, cervical region, sequela ([facility]   G82.54 (ICD-10-CM) - Incomplete quadriplegia at C5-6 level ([facility]   G83.4 (ICD-10-CM) - Cauda equina syndrome ([facility]   Rehab Codes: R25 pain , R53.1 weakness, Z74.0 reduced mobility, R29.6 high fall risk, R27.8 lack of coordination, R26.89 gait abnormality   Date of symptom onset: 21 -- cauda equina; 22 Cervical surgeries   Next 's appt.: 10/12/22 with Neurosurgery; 22 with PMR Dr. Adria Delarosa   Total Visits: 36/47  Cx/Ns:1/0     Precautions: Can now wean out of cervical collar; not lifting greater than a gallon of milk:       Subjective:    Pain:  [x] Yes  [] No Location: Neck down to (B) Shoulders/UEs to knuckles in hands (B) LE's int feet and toes    Pain Rating: (0-10 scale) 6/10  Pain altered Tx:  [x] No  [] Yes  Action:  Comments: Patient arrives with 2WW and states she has remained compliant with using her SPC at home. Patient states she feels \"off\" today and has increased anxiety and feels very stiff and tense because of it. Objective:     Exercises/Interventions   - Nustep x 6 min Lvl 5  - Seated hamstring stretch (B) 3x30'' ea    - Gait training in [facility]- (medium shorts): cues through out for increasing L heel/toe pattern, symmetrical step length/stance time.               -- 90% BWS, 2x3 minute bouts               -- bout 1) 1.0-1.2 mph               -- bout 2) 1.4 mph with patient having increased LE stiffness L>R    - Gait training with SPC throughout clinic for carryover of cues and education given with alter - 200ft CGA    - STS with R LE on 2in step to increase time/stance stability on LLE - 5 reps x 2 sets with 10 second stand with each rep. Specific Instructions for next treatment: work on treadmill for high intensity gait training -- in alter g to lite gait. Focus on gait parameters to get equal step lengths and Wbing; stance control interventions     Assessment:    [] Progressing toward goals. [] No change. [x] Other: Initiated session with nustep in attempt to improve joint mobility and decrease anxiety-related stiffness. Continued with gait training on alter-G to improve gait pattern with cues given to increase L heel strike/toe off, symmetrical step lengths and equal stance times. Patient had difficulty improving gait pattern this session and had instances of toe catching on L LE. Patient was very discouraged and requested to discontinue use of the alter-G, as it causes more anxiety for her and her fear of falling. Continued with STS activity with RLE on 2in step in order to increase LLE weight bearing and stance time. After a seated rest break, session was continued with further gait training with use of SPC throughout clinic. Patient had difficulty with carryover of cues given while in alter-G and fatigued more quickly than usual. Patient had muscle fatigue that was demonstrated with BLE shakiness, decreased gait speed, and increased BLE stiffness. Discussed with patient the importance of remaining compliant with not only use of SPC, but the exercises given previously in order to continue progressing. Also discussed if patient continues to have this \"off\" feeling, she may want to contact her doctor. [x] Patient would continue to benefit from skilled physical therapy services in order to: improve strength,mobiltiy and motor control that impacts balance and mobility. GOALS -- updated 8/16/22 & 9/28/22   Goals to Continue for POC:   3.  Pt will increase gait speed to >0.8 m/s  With LRAD at mod I level to demonstrate decreased fall risk and optimize mobility to at community level. - 6/15: limited increase in speed but pt is now mod IND with 2WW        - 7/21: progressing-- significant improvement in gait speed with 2WW to 0.67m/s & 0.46m/s with [facility]         -8/16: progressing with use of SPC -- will assess formally in upcoming sessions.         -9/28: progressing -- 0.5 m/s with [facility] distant supervision   4. Pt will improve score on ESCOBEDO balance scale by >/= 45/56 points  to improve overall balance stability and decrease fall risk with mobility. - 6/15: progressing -- 33/56          - 7/21: progressing -- improves score ot 38/56        -8/16: progressing -- 44/56 greater improvement         -9/28: plateau in progress at 44/56  5. Pt will increase strength of all major muscle groups of the LEs to 4/5 or greater demonstrating improved strength needed to maximize safety and efficiency with mobility tasks such as gait, transfers and stairs. - 6/15: progressing  -7/21; Progressing -- great progress made with LE strength, especially L ankle strength now all being above 3/5  - 8/16: progressing -- almost met but still working on L ankle eversion strength   -9/28: progressing -- will formally assess at later session   6. Pt will be independent with home program addressing strength, flexibility and balance to maximize gains made in therapy to improve overall functional capacity for mobility.     -6/15: progressing -- will continue to update in POC  -7/21: progressing -- will continue to update in POC  -8/16: progressing -- will continue to update in POC, notes good compliance   - 9/28: progressing -- will continue to update in POC, notes good compliance    7. Pt will improve score on OPTIMAL to 38/90 to demonstrate functional improvements with ADLs.                     -9/28: progressing -- 39/90    8. NEW GOAL 7/12:  Pt will improve AROM in of cervical ROM to >60 bilaterally to improve ability to visually scan while driving.              -7/21; Progressing               -8/16: progressing -- 52 deg to R this date; 48 deg to L this date               - 9/28: progressing -- reaches to the R, limited on L   9. New goal 8/16/22: Pt will be able to navigate curbs, uneven surfaces, and inclines/declines with [facility] with distant supervision to ensure She can safely navigate the community & get outdoors more. -9/28: progressing -- completes with SBA in sessions   10. New goal 8/16/22: Pt will be able to ambulate >500ft with 6mWT with SPC to demonstrate improved endurance and efficiency needed for community ambulation.               - 9/28: progressing -- reaches 440ft with SPC (178ft improvement)   11. NEW GOAL 9/28/22: Pt will be able to ambulate for 10 minutes straight with fluid gait pattern to demonstrate good endurance needed to optimize community ambulation such as at grocery store or for visiting family. Patient stated Goals: to walk without a walker, improve balance     Pt. Education:  [x] Yes  [] No  [x] Reviewed Prior HEP/Ed  Method of Education: [x] Verbal  [] Demo  [] Written  Comprehension of Education:  [x] Verbalizes understanding. [] Demonstrates understanding. [] Needs review. [] Demonstrates/verbalizes HEP/Ed previously given. Access Code: [email or web address]  URL: [email or web address]  Date: 08/26/2022  Prepared by: Johnathon Cheadle    Exercises  Long Sitting Ankle Eversion with Resistance - 1 x daily - 7 x weekly - 3 sets - 10 reps  Long Sitting Ankle Plantar Flexion with Resistance - 1 x daily - 7 x weekly - 3 sets - 10 reps  Long Sitting Ankle Inversion with Resistance - 1 x daily - 7 x weekly - 3 sets - 10 reps  Long Sitting Ankle Dorsiflexion with Anchored Resistance - 1 x daily - 7 x weekly - 3 sets - 10 reps      PLAN:    [x] Continue per POC        Treatment Charges: Mins Units   []  Modalities     [x]  Ther Exercise 25 2   []  Manual Therapy     []  Ther Activities     []  Aquatics     [x]  Neuromuscular     [] Vasocompression     [x] Gait Training 15 1   [] Dry needling        [] 1 or 2 muscles        [] 3 or more muscles     []  Other     Total Treatment time 40 3   * KX modifier needed*   *12 min unbilled time for don/doff alter-G pants & increased seated rest breaks*    Time In: 3:03 pm      Time Out:  3:55pm      Electronically signed by:  Jerzy Hidalgo PTA

## 2022-10-06 ENCOUNTER — HOSPITAL ENCOUNTER (OUTPATIENT)
Dept: OCCUPATIONAL THERAPY | Age: 62
Setting detail: THERAPIES SERIES
Discharge: HOME | End: 2022-10-06
Payer: MEDICARE

## 2022-10-06 PROCEDURE — 97537 COMMUNITY/WORK REINTEGRATION: CPT
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
## 2022-10-10 ENCOUNTER — HOSPITAL ENCOUNTER (OUTPATIENT)
Dept: PHYSICAL THERAPY | Age: 62
Setting detail: THERAPIES SERIES
Discharge: HOME | End: 2022-10-10
Payer: MEDICARE

## 2022-10-10 ENCOUNTER — HOSPITAL ENCOUNTER (OUTPATIENT)
Dept: OCCUPATIONAL THERAPY | Age: 62
Setting detail: THERAPIES SERIES
Discharge: HOME | End: 2022-10-10
Payer: MEDICARE

## 2022-10-10 PROCEDURE — 97110 THERAPEUTIC EXERCISES: CPT

## 2022-10-10 PROCEDURE — 97116 GAIT TRAINING THERAPY: CPT

## 2022-10-10 PROCEDURE — 97537 COMMUNITY/WORK REINTEGRATION: CPT
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
## 2022-10-10 NOTE — FLOWSHEET NOTE
[facility]   [address] [facility] [address]   Phone: [phone]   Fax: [phone]    Physical Therapy Daily Treatment Note      Date:  10/10/2022  Patient Name:  Claudene Cohen    :  1960  MRN: [id number]  Physician: Randee Ribeiro MD                            Insurance: [place] - based on [place] -- [address] [place] [address] Diagnosis:   G65.594F (ICD-10-CM) - Spinal cord injury, cervical region, sequela ([facility] [address]   G82.54 (ICD-10-CM) - Incomplete quadriplegia at C5-6 level ([facility] [facility]   G83.4 (ICD-10-CM) - Cauda equina syndrome ([facility] [address]   Rehab Codes: R25 pain , R53.1 weakness, Z74.0 reduced mobility, R29.6 high fall risk, R27.8 lack of coordination, R26.89 gait abnormality   Date of symptom onset: 21 -- cauda equina; 22 Cervical surgeries   Next 's appt.: 10/12/22 with Neurosurgery; 22 with PMR Dr. Heather Marie   Total Visits: 37/47  Cx/Ns:1/0     Precautions: Can now wean out of cervical collar; not lifting greater than a gallon of milk:       Subjective:  Patient reporting to therapy stating she has been walking with the cane and that she amb better when she is barefooted. Pain:  [x] Yes  [] No Location: Neck down to (B) Shoulders/UEs to knuckles in hands (B) LE's int feet and toes    Pain Rating: (0-10 scale) 5/10  Pain altered Tx:  [x] No  [] Yes  Action:  Comments:       Objective:     Exercises/Interventions   - Nustep x 6 min Lvl 5  -Eric step overs with SPC 10x ea side-- RLE burning in SLS.      - Gait training in Lite-gait- 5'x2- standing rest break in between trials. Increased speed as patient felt comfortable with cues consistently provided to correct gait to promote more heel strike B and inc step length on R side.   Speed 0.7- 1'30\"  Speed 0.9- 2'30\"  Speed 1.0 1'    Trial 2  Speed 1.0- 1'  Speed 1.2- 4'     - Gait training with [facility] throughout clinic for carryover of cues- 3 Laps around clinic (~366ft) CGA-SBA demonstrating improved gait.  Cues as needed to promote a more straight path with swing phase on LLE and inc heel strike B.    - STS with R LE onstep to increase time/stance stability on LLE - 5 reps with 2in step no UE support CGA; 5x 4in step with One UE support needed; #5 of last set attempted without UE support with Min A needed to correct balance. Specific Instructions for next treatment: work on treadmill for high intensity gait training -- in alter g to lite gait. Focus on gait parameters to get equal step lengths and Wbing; stance control interventions     Assessment:    [x] Progressing toward goals. Began session on NuStep to promote larger steps and reciprocal arm swing when transitioning to gait. Added Lite Gait this session to continue with improving step length and stance phase of gait on BLEs. During amb with SPC patient began to demonstrate circumducted gait but was able to correct and maintain post VC. Continued with standing basil step overs and sit<>stand exercises to improve WB through LLE to improve stance phase of gait. Patien required short seated rest breaks throughout session due to fatigue. Patient had reported feeling fatigued at the end of session. [] No change. [] Other:      [x] Patient would continue to benefit from skilled physical therapy services in order to: improve strength,mobiltiy and motor control that impacts balance and mobility. GOALS -- updated 8/16/22 & 9/28/22   Goals to Continue for POC:   3. Pt will increase gait speed to >0.8 m/s  With LRAD at mod I level to demonstrate decreased fall risk and optimize mobility to at community level. - 6/15: limited increase in speed but pt is now mod IND with 2WW        - 7/21: progressing-- significant improvement in gait speed with 2WW to 0.67m/s & 0.46m/s with [facility]         -8/16: progressing with use of SPC -- will assess formally in upcoming sessions.         -9/28: progressing -- 0.5 m/s with [facility] distant supervision   4. Pt will improve score on ESCOBEDO balance scale by >/= 45/56 points  to improve overall balance stability and decrease fall risk with mobility. - 6/15: progressing -- 33/56          - 7/21: progressing -- improves score ot 38/56        -8/16: progressing -- 44/56 greater improvement         -9/28: plateau in progress at 44/56  5. Pt will increase strength of all major muscle groups of the LEs to 4/5 or greater demonstrating improved strength needed to maximize safety and efficiency with mobility tasks such as gait, transfers and stairs. - 6/15: progressing  -7/21; Progressing -- great progress made with LE strength, especially L ankle strength now all being above 3/5  - 8/16: progressing -- almost met but still working on L ankle eversion strength   -9/28: progressing -- will formally assess at later session   6. Pt will be independent with home program addressing strength, flexibility and balance to maximize gains made in therapy to improve overall functional capacity for mobility.     -6/15: progressing -- will continue to update in POC  -7/21: progressing -- will continue to update in POC  -8/16: progressing -- will continue to update in POC, notes good compliance   - 9/28: progressing -- will continue to update in POC, notes good compliance    7. Pt will improve score on OPTIMAL to 38/90 to demonstrate functional improvements with ADLs.                     -9/28: progressing -- 39/90    8. NEW GOAL 7/12: Pt will improve AROM in of cervical ROM to >60 bilaterally to improve ability to visually scan while driving.              -7/21; Progressing               -8/16: progressing -- 52 deg to R this date; 48 deg to L this date               - 9/28: progressing -- reaches to the R, limited on L   9. New goal 8/16/22: Pt will be able to navigate curbs, uneven surfaces, and inclines/declines with [facility] with distant supervision to ensure She can safely navigate the community & get outdoors more. -9/28: progressing -- completes with SBA in sessions   10. New goal 8/16/22: Pt will be able to ambulate >500ft with 6mWT with SPC to demonstrate improved endurance and efficiency needed for community ambulation.               - 9/28: progressing -- reaches 440ft with SPC (178ft improvement)   11. NEW GOAL 9/28/22: Pt will be able to ambulate for 10 minutes straight with fluid gait pattern to demonstrate good endurance needed to optimize community ambulation such as at grocery store or for visiting family. Patient stated Goals: to walk without a walker, improve balance     Pt. Education:  [x] Yes  [] No  [x] Reviewed Prior HEP/Ed  Method of Education: [x] Verbal  [] Demo  [] Written  Comprehension of Education:  [x] Verbalizes understanding. [] Demonstrates understanding. [] Needs review. [] Demonstrates/verbalizes HEP/Ed previously given. Access Code: [email or web address]  URL: [email or web address]  Date: 08/26/2022  Prepared by: Adrianna Garcia    Exercises  Long Sitting Ankle Eversion with Resistance - 1 x daily - 7 x weekly - 3 sets - 10 reps  Long Sitting Ankle Plantar Flexion with Resistance - 1 x daily - 7 x weekly - 3 sets - 10 reps  Long Sitting Ankle Inversion with Resistance - 1 x daily - 7 x weekly - 3 sets - 10 reps  Long Sitting Ankle Dorsiflexion with Anchored Resistance - 1 x daily - 7 x weekly - 3 sets - 10 reps      PLAN:    [x] Continue per POC        Treatment Charges: Mins Units   []  Modalities     []  Ther Exercise     []  Manual Therapy     []  Ther Activities     []  Aquatics     [x]  Neuromuscular 25 2   [] Vasocompression     [x] Gait Training 29 2   [] Dry needling        [] 1 or 2 muscles        [] 3 or more muscles     []  Other     Total Treatment time 54 3   * KX modifier needed*   5' not counted for seated rest breaks and donning/doffing harness for Lite Gait       Time In: 1342   Time Out:  1441      Electronically signed by:  Riana Ordoñez PTA

## 2022-10-12 ENCOUNTER — OFFICE VISIT (OUTPATIENT)
Dept: NEUROSURGERY | Age: 62
End: 2022-10-12
Payer: MEDICARE

## 2022-10-12 VITALS
SYSTOLIC BLOOD PRESSURE: 139 MMHG | HEART RATE: 90 BPM | TEMPERATURE: 97 F | OXYGEN SATURATION: 97 % | BODY MASS INDEX: 25.49 KG/M2 | WEIGHT: 135 LBS | DIASTOLIC BLOOD PRESSURE: 87 MMHG | HEIGHT: 61 IN

## 2022-10-12 DIAGNOSIS — G99.2 STENOSIS OF CERVICAL SPINE WITH MYELOPATHY (HCC): ICD-10-CM

## 2022-10-12 DIAGNOSIS — I89.0 LYMPHEDEMA OF RIGHT UPPER EXTREMITY: Primary | ICD-10-CM

## 2022-10-12 DIAGNOSIS — M48.02 STENOSIS OF CERVICAL SPINE WITH MYELOPATHY (HCC): ICD-10-CM

## 2022-10-12 DIAGNOSIS — M43.8X2 SWAN NECK DEFORMITY OF CERVICAL SPINE: ICD-10-CM

## 2022-10-12 PROCEDURE — G8419 CALC BMI OUT NRM PARAM NOF/U: HCPCS | Performed by: NEUROLOGICAL SURGERY

## 2022-10-12 PROCEDURE — 99214 OFFICE O/P EST MOD 30 MIN: CPT | Performed by: NEUROLOGICAL SURGERY

## 2022-10-12 PROCEDURE — G8427 DOCREV CUR MEDS BY ELIG CLIN: HCPCS | Performed by: NEUROLOGICAL SURGERY

## 2022-10-12 PROCEDURE — G8484 FLU IMMUNIZE NO ADMIN: HCPCS | Performed by: NEUROLOGICAL SURGERY

## 2022-10-12 PROCEDURE — 3017F COLORECTAL CA SCREEN DOC REV: CPT | Performed by: NEUROLOGICAL SURGERY

## 2022-10-12 PROCEDURE — 1036F TOBACCO NON-USER: CPT | Performed by: NEUROLOGICAL SURGERY

## 2022-10-12 NOTE — PROGRESS NOTES
[address]  [facility] [address] [facility] [address] [address]  Dept: [phone]    Patient:  Gala Gutierrez  YOB: 1960  Date: 10/12/22    The patient is a 65 y.o. female who presents today for consult of the following problems:     Chief Complaint   Patient presents with    Follow-up             HPI:     Gala Gutierrez is a 65 y.o. female on whom neurosurgical consultation was requested by Althea Hernandez MD for management of cervical stenosis with myelopathy as well as swan-neck deformity. The patient has improved remarkably in terms of the myelopathy and is now ambulating with a walker and slowly attempting to transition to a cane. She has regained the majority of her function in her lower extremities as well as upper extremity dexterity movement function strength and sensation. Does have some paresthesias but no numbness in the upper extremities. No recent falls. No bowel or bladder dysfunction including incontinence or retention. Approximately 1 month ago she started getting slow progression of lymphedema in the right upper extremity where she has never had any prior issues. She is had a prior right-sided mastectomy as well as reconstruction denies ever having issues previously with lymphedema in that limb. Also denies any issues in terms of IV access to that right arm recently or remotely.       History:     Past Medical History:   Diagnosis Date    Anxiety     Arthritis     At maximum risk for fall 12/29/2021    caudal equina syndrome and cervical stenosis    Cancer (HCC)     right breast    Cauda equina syndrome ([facility] [address] 12/29/2021    neuropathy, mobility difficulty, incontinance    Cervical stenosis of spine 12/29/2021    GERD (gastroesophageal reflux disease)     History of stomach ulcers     Hypertension     Dr. Michelle Monge    Hypothyroidism     Lumbar neuritis     Post laminectomy syndrome     Spinal deformity 11/2021    cervical and lumbar    Thyroid disease     Uses wheelchair     Wears dentures     Wellness examination     Dr. Destiny Garza     Past Surgical History:   Procedure Laterality Date    BACK SURGERY      lower back x 3, cervical- x 1: C4- C6, 11/4/2014     BREAST SURGERY Right     mastectomy with reconstruction    CERVICAL FUSION N/A 4/12/2022    C5 CORPECTOMY, USE OF INTRAOPERATIVE CERVICAL TRACTION performed by Lorna Portillo DO at [address] [facility] N/A 4/12/2022    POSTERIOR FIXATION C2-C7 performed by Lorna Portillo DO at [address]  about 2018    HERNIA REPAIR Bilateral     inguinal    HYSTERECTOMY, TOTAL ABDOMINAL (CERVIX REMOVED)      LUMBAR SPINE SURGERY N/A 12/30/2021    LUMBAR LAMINECTOMY L2-S1 performed by Lorna Portillo DO at [address] [facility] [place], RADICAL      OTHER SURGICAL HISTORY  04/12/2022    C5 CORPECTOMY, USE OF INTRAOPERATIVE CERVICAL TRACTION (N/A Spine Cervical)      Family History   Problem Relation Age of Onset    Hypertension Mother     Heart Disease Mother     Cancer Mother      Current Outpatient Medications on File Prior to Visit   Medication Sig Dispense Refill    levothyroxine (SYNTHROID) 100 MCG tablet Take 1 tablet by mouth Daily 30 tablet 3    lisinopril (PRINIVIL;ZESTRIL) 5 MG tablet Take 1 tablet by mouth daily 30 tablet 3    Handicap Placard MISC by Does not apply route Cannot walk 200 feet due to medical issues  Expires 7/14/2027 1 each 0    busPIRone (BUSPAR) 30 MG tablet TAKE ONE TABLET BY MOUTH TWICE A DAY 60 tablet 3    HYDROcodone-acetaminophen (NORCO)  MG per tablet Take 1 tablet by mouth every 6 hours as needed for Pain.      dilTIAZem (CARDIZEM CD) 180 MG extended release capsule Take 1 capsule by mouth daily 30 capsule 3    fluticasone (FLONASE) 50 MCG/ACT nasal spray 1 spray by Each Nostril route daily 16 g 3    docusate sodium (COLACE) 100 MG capsule Take 2 capsules by mouth 2 times daily 60 capsule 1    baclofen (LIORESAL) 10 MG tablet Take 1 tablet by mouth 4 times daily 120 tablet 1    pantoprazole (PROTONIX) 40 MG tablet Take 1 tablet by mouth daily 30 tablet 3    methadone (DOLOPHINE) 10 MG tablet Take 10 mg by mouth 2 times daily. ferrous sulfate (IRON 325) 325 (65 Fe) MG tablet Take 1 tablet by mouth 2 times daily (with meals) (Patient not taking: No sig reported) 30 tablet 3    Vitamin D, Ergocalciferol, 50 MCG (2000 UT) CAPS TAKE ONE CAPSULE BY MOUTH DAILY (Patient not taking: No sig reported) 90 capsule 3     No current facility-administered medications on file prior to visit. Social History     Tobacco Use    Smoking status: Former     Packs/day: 0.50     Years: 30.00     Pack years: 15.00     Types: Cigarettes     Quit date:      Years since quittin.7    Smokeless tobacco: Never   Vaping Use    Vaping Use: Every day    Substances: Nicotine   Substance Use Topics    Alcohol use: Yes     Alcohol/week: 0.0 standard drinks     Comment: weekend at times    Drug use: Yes     Types: Prescription     Comment: Methadone from [facility]       Allergies   Allergen Reactions    Latex Hives, Itching, Dermatitis and Rash    Kiwi Extract      Face swelling, some difficulty breathing       Review of Systems  ROS: swelling RUE    Physical Exam:      /87   Pulse 90   Temp 97 °F (36.1 °C) (Temporal)   Ht 5' 1\" (1.549 m)   Wt 135 lb (61.2 kg)   SpO2 97%   BMI 25.51 kg/m²   Estimated body mass index is 25.51 kg/m² as calculated from the following:    Height as of this encounter: 5' 1\" (1.549 m). Weight as of this encounter: 135 lb (61.2 kg). General:  Soheila Mccarty is a 65y.o. year old female who appears her stated age. HEENT: Normocephalic atraumatic. Neck supple. Chest: regular rate; pulses equal. Equal chest rise and fall  Abdomen: Soft nondistended.    Ext: DP equal with good capillary refill  Neuro    Mentation  Appropriate affect   oriented    Cranial Nerves:   Pupils equal and reactive to light  Extraocular motion intact  Face symmetric  No dysarthria  v1-3 sensation symmetric, masseter tone symmetric  Hearing symmetric and intact to finger rub    Sensation:   intact    Motor  L deltoid 5/5; R deltoid 5/5  L biceps 5/5; R biceps 5/5  L triceps 5/5; R triceps 5/5  L wrist extension 5/5; R wrist extension 5/5  L intrinsics 5/5; R intrinsics 5/5     L iliopsoas 5/5 , R iliopsoas 5/5  L quadriceps 5/5; R quadriceps 5/5  L Dorsiflexion 5/5; R dorsiflexion 5/5  L Plantarflexion 5/5; R plantarflexion 5/5  L EHL 5/5; R EHL 5/5    Reflexes  L Brachioradialis 2+/4; R brachioradialis 2+/4  L Biceps 2+/4; R Biceps 2+/4  L Triceps 2+/4; R Triceps 2+/4  L Patellar 2+/4: R Patellar 2+/4  L Achilles 2+/4; R Achilles 2+/4    hoffmans L: neg  hoffmans R: neg  Clonus L: neg  Clonus R: neg  Babinski L: up  Babinski R; up    Studies Review:     Xrays stable compared to ct    Assessment and Plan:      1. Lymphedema of right upper extremity    2. Elsa neck deformity of cervical spine    3. Stenosis of cervical spine with myelopathy (HCC)          Plan:   Doing well in terms of myelopathy  Given compression stocking RUE, counselled on elevation. Spoke with Dr Shakir Strickland during visit and recommended lymphedema clinic- referral placed  Counseled on multiple factors including fall risk such as throw rugs, elevated portions of door frames or any other areas of the ground. [place] surfaces including clean vinyl or hardwood  6mo f/u with xray  Followup: No follow-ups on file. Prescriptions Ordered:  No orders of the defined types were placed in this encounter.        Orders Placed:  Orders Placed This Encounter   Procedures    XR CERVICAL SPINE (4-5 VIEWS)     Standing Status:   Future     Standing Expiration Date:   10/12/2023     Order Specific Question:   Reason for exam:     Answer:   standing ap and lat with swimmers view    [place] [address]     Referral Priority:   Routine     Referral Type:   Eval and Treat     Referral Reason:   Specialty Services Required     Number of Visits Requested:   1    Compression Stocking     Needs upper extremity compression stocking        Electronically signed by Gisela Meléndez DO on 10/12/2022 at 11:23 AM    Please note that this chart was generated using voice recognition Dragon dictation software. Although every effort was made to ensure the accuracy of this automated transcription, some errors in transcription may have occurred.

## 2022-10-13 ENCOUNTER — HOSPITAL ENCOUNTER (OUTPATIENT)
Dept: PHYSICAL THERAPY | Age: 62
Setting detail: THERAPIES SERIES
Discharge: HOME | End: 2022-10-13
Payer: MEDICARE

## 2022-10-13 ENCOUNTER — HOSPITAL ENCOUNTER (OUTPATIENT)
Dept: OCCUPATIONAL THERAPY | Age: 62
Setting detail: THERAPIES SERIES
Discharge: HOME | End: 2022-10-13
Payer: MEDICARE

## 2022-10-13 PROCEDURE — 97112 NEUROMUSCULAR REEDUCATION: CPT

## 2022-10-13 PROCEDURE — 97116 GAIT TRAINING THERAPY: CPT

## 2022-10-13 PROCEDURE — 97537 COMMUNITY/WORK REINTEGRATION: CPT

## 2022-10-13 NOTE — PROGRESS NOTES
[address]  Rehabilitation Services  Occupational Therapy  Rehabilitation Treatment Note  Date: 10/13/22  Patient Name: Koaml Reid    MRN: [id number]  Account: [de-identified]   : 1960  (65 y.o.) Gender: female     Insurance Information:  Medic[place] - based on [place] -- [facility]      OT Visit Information:  Visit #: 6     Referring Physician: Ju Alonso MD    Neurosurgeon: Helen Juan DO                           Medical Diagnosis:   V26.153R (ICD-10-CM) - Spinal cord injury, cervical region, sequela ([facility] [address]   G82.54 (ICD-10-CM) - Incomplete quadriplegia at C5-6 level ([address]   G83.4 (ICD-10-CM) - Cauda equina syndrome ([address]   Rehab Codes: R25 pain , R53.1 weakness, Z74.0 reduced mobility, R29.6 high fall risk, R27.8 lack of coordination  Date of symptom onset: 21 -- cauda equina; 22 Cervical surgeries   Next 's appt.: 22 with PMR Dr. Catarina Glynn      Reason for Referral: Spinal cord injury, cervical region, sequela ([facility] [address] Komal Reid, 65 y.o., female (Nontraumatic cervical spinal cord injury). Patient with recent cervical spinal cord injury related to critical cervical stenosis who was admitted to [facility] for inpatient acute rehabilitation from 4/15/22 - 22. Patient c/o reduced endurance which is gradually improving with parasthesias in B hands & feet. Patient currently being followed by Pain Management and has been followed for the past 11 yeas with patient currently prescribed Hydrocodone  every 6 hours & Methadone 2x/day. Patient with a history of anxiety and is currently taking an anti-anxiety medication. Subjective: Patient reporting concern regarding sensory impairment in R distal LE; patient continuing on Hydrocodone & Methadone for treatment of pain; patient taking oral Baclofen to control spasticity in bilateral calves. Patient with new complaints of lymphedema in R UE (prior history of right mastectomy 30 years ago). Patient with referral to OP OT for lymphedema management & patient has a prescription for an arm sleeve/compression garment R UE. Results of Initial  Evaluation 1/56/5881: Patient scores on clinical visual perceptual evaluation revealed mild impairment in dynamic vision in the areas of visual pursuits and saccades; decreased ability to attend to simultaneous bilateral visual stimuli; decreased proprioceptive and kinesthetic input in distal R LE for acceleration and braking, mikaela positioning and consistent safe negotiation of intersections on simulated assessments of driving. Results of Simulated Assessments Intermediate-Advanced Level w/Seat & Back Cushions (to maximize clearance over wheel due to decreased thoracic extension) 10/10/2022: Patient with occasional brake/accelerator pedal confusion; patient with decreased hazard avoidance skills, mikaela positioning; speed control; steering reaction time and pedal response speed. Results of Simulated Assessments w/Seat & Back Cushions (to maximize clearance over wheel due to decreased thoracic extension) 10/13/2022: Patient scores on simulated tests of driving revealed decreased consistent adherence to traffic lights; decreased mikaela positioning and speed control and decreased visual processing speed. Operational Skills              Primary Controls (accelerator, brake, steering wheel): Patient with continued inconsistent brake/accelerator pedal discrimination. Secondary Controls (fastening/unfastening seatbelt; adjusting seat/steering wheel; starting the drive; turn signal; checking for traffic; &, operating gear shift):  Modified independent with use of seat & back cushions     Hazard Avoidance [address]: Patient able to avoid all potential collisions throughout drive; patient with consistent with adherence to all stops; patient over speed 7x & 4% of drive time; patient crossed center 14x, and out of mikaela 33% of drive time with patient able to safely negotiate 10/10 intersections with an average pedal response speed of 0.5 sec which is W[place]. Construction Zone Intermediate Level Drive: Patient able to avoid all potential roadway obstacles throughout the drive; patient able to maintain speed control & mikaela positioning throughout the drive with an average speed of 21 mph and a pedal response speed of 1.1 sec which is [facility] however occasional pedal discrimination difficulty noted. Road Test #3 Advanced Level Drive: Patient able to avoid all potential collisions throughout the drive; patient crossed center 6x, off road 7x & out of mikaela 7% of drive time; patient able to safely negotiate 10/10 intersections with an average pedal response speed of 1.0 sec which is [facility] [place] with an average speed of 38 mph. Road Test #1 Advanced Level Drive: Patient with 3 collisions throughout the drive; patient over posted speed 2% of drive time; patient crossed center 8x, off road 8x & out of mikaela 9% of drive time; patient able to safely negotiate 10/10 intersections with an average pedal response speed of 0.6 sec which is [facility] [employer] [facility] with an average speed of 43 mph. Assessment: Patient with occasional brake/accelerator pedal confusion; patient with decreased hazard avoidance skills, mikaela positioning; speed control; steering reaction time and pedal response speed. Plan: Plan to target specific  performance skills in the areas of brake/accelerator pedal confusion; patient with decreased hazard avoidance skills, mikaela positioning & speed control with patient to transition to the behind-wheel evaluation on-road in the [facility]) modified vehicle next session.      Treatment Charges:  Minutes Units Time In-Out   Community Reintegration 60 4 2-3:00 pm   Total Treatment Time:  60

## 2022-10-13 NOTE — FLOWSHEET NOTE
[facility] Outpatient Physical Therapy   Formerly named [facility]   Phone: [phone]   Fax: [phone]    Physical Therapy Daily Treatment Note      Date:  10/13/2022  Patient Name:  Gala Gutierrez    :  1960  MRN: [id number]  Physician: Tamra Rincon MD                            [place] [facility] [place]:   N07.430O (ICD-10-CM) - Spinal cord injury, cervical region, sequela ([facility] [address]   G82.54 (ICD-10-CM) - Incomplete quadriplegia at C5-6 level ([facility] [address]   G83.4 (ICD-10-CM) - Cauda equina syndrome ([facility] [address]   Rehab Codes: R25 pain , R53.1 weakness, Z74.0 reduced mobility, R29.6 high fall risk, R27.8 lack of coordination, R26.89 gait abnormality   Date of symptom onset: 21 -- cauda equina; 22 Cervical surgeries   Next 's appt.: 10/12/22 with Neurosurgery; 22 with PMR Dr. Dashawn Badillo   Total Visits: 39/47  CORRECTED 10/13/22 Cx/Ns:1/0     Precautions: Can now wean out of cervical collar; not lifting greater than a gallon of milk:       Subjective:  Patient reporting to therapy stating she saw her doctor the other day about her arm and they referred her to therapy for lymphedema therapy but is already seeing OT for driving. Pain:  [x] Yes  [] No Location: Neck down to (B) Shoulders/UEs to knuckles in hands (B) LE's int feet and toes    Pain Rating: (0-10 scale) 5/10  Pain altered Tx:  [x] No  [] Yes  Action:  Comments:       Objective:     Exercises/Interventions   - Nustep x 6 min Lvl 5  -Eric step overs with SPC 10x ea side-- RLE burning in SLS.      - Gait training in Lite-gait- 5'x2- standing rest break in between trials. Total 937ft  Trial 1: speed 1.1 5' 470ft-     Trial 2 speed 1.0 5' 467ft. Gait with SPC- 120' SBA demonstrating improved step length and heel strike with minimal cues needed. Patient reported feeling fatigued at this point in session and requested to finish today's session.     Standing  Eric step overs- 10x 2 ea side with SPC. Cues to inc weight bearing and hold on RLE to improve weight shift onto LLE when stepping fwd and back. Specific Instructions for next treatment: work on treadmill for high intensity gait training -- in alter g to lite gait. Focus on gait parameters to get equal step lengths and Wbing; stance control interventions     Assessment:    [x] Progressing toward goals. Continued with gait training this session to improve patient's confidence and safety with amb with a SPC. Patient was able to tolerate an increase in speed with the first trial of amb on the Lite Gait, but required to slow down in the second trial due to fatigue. At this time, patient was also educated on self pacing when amb to prevent from fatiguing quickly. Min- mod cues needed to encourage longer step length and heel strike for B LE and to inc weigt shift to L side to be able to improve step length on RLE. Continued with basil step overs to promote weight bearing/shifting onto LLE in stance phase and to also improve step height and length on LLE. Completed session with one lap around clinic as patient reported feeling very fatigued at the end of session. No LOB noted with last lap and minimial cues required at that time. [] No change. [] Other:      [x] Patient would continue to benefit from skilled physical therapy services in order to: improve strength,mobiltiy and motor control that impacts balance and mobility. GOALS -- updated 8/16/22 & 9/28/22   Goals to Continue for POC:   3. Pt will increase gait speed to >0.8 m/s  With LRAD at mod I level to demonstrate decreased fall risk and optimize mobility to at community level. - 6/15: limited increase in speed but pt is now mod IND with 2WW        - 7/21: progressing-- significant improvement in gait speed with 2WW to 0.67m/s & 0.46m/s with [employer]         -8/16: progressing with use of SPC -- will assess formally in upcoming sessions. -9/28: progressing -- 0.5 m/s with [facility] distant supervision   4. Pt will improve score on ESCOBEDO balance scale by >/= 45/56 points  to improve overall balance stability and decrease fall risk with mobility. - 6/15: progressing -- 33/56          - 7/21: progressing -- improves score ot 38/56        -8/16: progressing -- 44/56 greater improvement         -9/28: plateau in progress at 44/56  5. Pt will increase strength of all major muscle groups of the LEs to 4/5 or greater demonstrating improved strength needed to maximize safety and efficiency with mobility tasks such as gait, transfers and stairs. - 6/15: progressing  -7/21; Progressing -- great progress made with LE strength, especially L ankle strength now all being above 3/5  - 8/16: progressing -- almost met but still working on L ankle eversion strength   -9/28: progressing -- will formally assess at later session   6. Pt will be independent with home program addressing strength, flexibility and balance to maximize gains made in therapy to improve overall functional capacity for mobility.     -6/15: progressing -- will continue to update in POC  -7/21: progressing -- will continue to update in POC  -8/16: progressing -- will continue to update in POC, notes good compliance   - 9/28: progressing -- will continue to update in POC, notes good compliance    7. Pt will improve score on OPTIMAL to 38/90 to demonstrate functional improvements with ADLs.                     -9/28: progressing -- 39/90    8. NEW GOAL 7/12: Pt will improve AROM in of cervical ROM to >60 bilaterally to improve ability to visually scan while driving.              -7/21; Progressing               -8/16: progressing -- 52 deg to R this date; 48 deg to L this date               - 9/28: progressing -- reaches to the R, limited on L   9.  New goal 8/16/22: Pt will be able to navigate curbs, uneven surfaces, and inclines/declines with [facility] with distant supervision to ensure She can safely navigate the community & get outdoors more. -9/28: progressing -- completes with SBA in sessions   10. New goal 8/16/22: Pt will be able to ambulate >500ft with 6mWT with SPC to demonstrate improved endurance and efficiency needed for community ambulation.               - 9/28: progressing -- reaches 440ft with SPC (178ft improvement)   11. NEW GOAL 9/28/22: Pt will be able to ambulate for 10 minutes straight with fluid gait pattern to demonstrate good endurance needed to optimize community ambulation such as at grocery store or for visiting family. Patient stated Goals: to walk without a walker, improve balance     Pt. Education:  [x] Yes  [] No  [x] Reviewed Prior HEP/Ed  Method of Education: [x] Verbal  [] Demo  [] Written  Comprehension of Education:  [x] Verbalizes understanding. [] Demonstrates understanding. [] Needs review. [] Demonstrates/verbalizes HEP/Ed previously given. Access Code: [email or web address]  URL: [email or web address]  Date: 08/26/2022  Prepared by: Umberto Valdovinos    Exercises  Long Sitting Ankle Eversion with Resistance - 1 x daily - 7 x weekly - 3 sets - 10 reps  Long Sitting Ankle Plantar Flexion with Resistance - 1 x daily - 7 x weekly - 3 sets - 10 reps  Long Sitting Ankle Inversion with Resistance - 1 x daily - 7 x weekly - 3 sets - 10 reps  Long Sitting Ankle Dorsiflexion with Anchored Resistance - 1 x daily - 7 x weekly - 3 sets - 10 reps      PLAN:    [x] Continue per POC        Treatment Charges: Mins Units   []  Modalities     []  Ther Exercise     []  Manual Therapy     []  Ther Activities     []  Aquatics     [x]  Neuromuscular 13 1   [] Vasocompression     [x] Gait Training 30 2   [] Dry needling        [] 1 or 2 muscles        [] 3 or more muscles     []  Other     Total Treatment time 43 3   * KX modifier needed*   5' not counted for seated rest breaks and donning/doffing harness for Lite Gait       Time In: 1305   Time Out: Alicia      Electronically signed by:  Lillian Polk, PTA

## 2022-10-17 ENCOUNTER — OFFICE VISIT (OUTPATIENT)
Dept: PRIMARY CARE CLINIC | Age: 62
End: 2022-10-17
Payer: MEDICARE

## 2022-10-17 VITALS
BODY MASS INDEX: 26.98 KG/M2 | HEART RATE: 110 BPM | WEIGHT: 142.8 LBS | DIASTOLIC BLOOD PRESSURE: 62 MMHG | SYSTOLIC BLOOD PRESSURE: 138 MMHG | OXYGEN SATURATION: 98 %

## 2022-10-17 DIAGNOSIS — I80.8 SUPERFICIAL THROMBOPHLEBITIS OF RIGHT UPPER EXTREMITY: ICD-10-CM

## 2022-10-17 DIAGNOSIS — Z23 NEED FOR VACCINATION: ICD-10-CM

## 2022-10-17 DIAGNOSIS — M79.89 ARM SWELLING: Primary | ICD-10-CM

## 2022-10-17 PROCEDURE — G0008 ADMIN INFLUENZA VIRUS VAC: HCPCS | Performed by: FAMILY MEDICINE

## 2022-10-17 PROCEDURE — G8484 FLU IMMUNIZE NO ADMIN: HCPCS | Performed by: FAMILY MEDICINE

## 2022-10-17 PROCEDURE — 3017F COLORECTAL CA SCREEN DOC REV: CPT | Performed by: FAMILY MEDICINE

## 2022-10-17 PROCEDURE — 90674 CCIIV4 VAC NO PRSV 0.5 ML IM: CPT | Performed by: FAMILY MEDICINE

## 2022-10-17 PROCEDURE — 99213 OFFICE O/P EST LOW 20 MIN: CPT | Performed by: FAMILY MEDICINE

## 2022-10-17 PROCEDURE — G8427 DOCREV CUR MEDS BY ELIG CLIN: HCPCS | Performed by: FAMILY MEDICINE

## 2022-10-17 PROCEDURE — 1036F TOBACCO NON-USER: CPT | Performed by: FAMILY MEDICINE

## 2022-10-17 PROCEDURE — G8419 CALC BMI OUT NRM PARAM NOF/U: HCPCS | Performed by: FAMILY MEDICINE

## 2022-10-17 RX ORDER — CIPROFLOXACIN 500 MG/1
500 TABLET, FILM COATED ORAL 2 TIMES DAILY
Qty: 28 TABLET | Refills: 0 | Status: SHIPPED
Start: 2022-10-17 | End: 2022-10-24 | Stop reason: SINTOL

## 2022-10-17 RX ORDER — ASPIRIN 81 MG/1
81 TABLET ORAL DAILY
Qty: 90 TABLET | Refills: 1 | COMMUNITY
Start: 2022-10-17

## 2022-10-17 NOTE — PROGRESS NOTES
Sig: Take 1 tablet by mouth daily     Dispense:  90 tablet     Refill:  1      Reviewed medications and possibleside effects.        Electronically signed by Talita Kaufman MD on 10/17/2022 at 11:17 AM

## 2022-10-19 ENCOUNTER — HOSPITAL ENCOUNTER (OUTPATIENT)
Dept: PHYSICAL THERAPY | Age: 62
Setting detail: THERAPIES SERIES
Discharge: HOME | End: 2022-10-19
Payer: MEDICARE

## 2022-10-19 ENCOUNTER — HOSPITAL ENCOUNTER (OUTPATIENT)
Dept: OCCUPATIONAL THERAPY | Age: 62
Setting detail: THERAPIES SERIES
Discharge: HOME | End: 2022-10-19
Payer: MEDICARE

## 2022-10-19 ENCOUNTER — APPOINTMENT (OUTPATIENT)
Dept: OCCUPATIONAL THERAPY | Age: 62
End: 2022-10-19
Payer: MEDICARE

## 2022-10-19 ENCOUNTER — APPOINTMENT (OUTPATIENT)
Dept: PHYSICAL THERAPY | Age: 62
End: 2022-10-19
Payer: MEDICARE

## 2022-10-19 PROCEDURE — 97537 COMMUNITY/WORK REINTEGRATION: CPT

## 2022-10-19 PROCEDURE — 97110 THERAPEUTIC EXERCISES: CPT

## 2022-10-19 PROCEDURE — 97112 NEUROMUSCULAR REEDUCATION: CPT

## 2022-10-19 PROCEDURE — 97116 GAIT TRAINING THERAPY: CPT

## 2022-10-19 NOTE — FLOWSHEET NOTE
[facility] Outpatient Physical Therapy   [address]   Phone: [phone]   Fax: [phone]    Physical Therapy Daily Treatment Note      Date:  10/19/2022  Patient Name:  Soheila Mccarty    :  1960  MRN: [id number]  Physician: Asuncion Santos MD                            Insurance: [place] - based on [place] -- [address] [place] [address] Diagnosis:   Y38.169M (ICD-10-CM) - Spinal cord injury, cervical region, sequela ([facility] [address]   G82.54 (ICD-10-CM) - Incomplete quadriplegia at C5-6 level ([facility] [address]   G83.4 (ICD-10-CM) - Cauda equina syndrome ([facility] [address]   Rehab Codes: R25 pain , R53.1 weakness, Z74.0 reduced mobility, R29.6 high fall risk, R27.8 lack of coordination, R26.89 gait abnormality   Date of symptom onset: 21 -- cauda equina; 22 Cervical surgeries   Next 's appt.: 10/12/22 with Neurosurgery; 22 with PMR Dr. Roel Anand   Total Visits: 40/47   Cx/Ns:1/0     Precautions:       Subjective:  Patient reporting to PT after driving. She feels that is going well. She is going to vascular MD for R arm swelling come Monday. Denies any falls since last session. She notes feeling stiff     Pain:  [x] Yes  [] No Location: Neck down to (B) Shoulders/UEs to knuckles in hands (B) LE's int feet and toes    Pain Rating: (0-10 scale) 6/10  Pain altered Tx:  [x] No  [] Yes  Action:  Comments:       Objective:     Exercises/Interventions   - Nustep x 4 min Lvl 5 --to reduce stiffness prior to lite gait. - Gait training in Lite-gait & treadmill- 5'x2- standing rest break in between trials. Total 1000ft  Trial 1: speed 1.1-1.3 mph, 5 minutes, 504 ft   --initially 3.3 for 2 minutes with BUE support. Reduced to unilateral UE support at speed of 1.1 with good fluid pattern. Trial 2: speed 0.8-1. 2mph  5 minutes, 496ft. -- initially with BUE support in theraband strap at 0.8mph; pt fatigues about 1:30 in, switched to unilateral UE support at 1. 2mph for remainder      Standing -- focusing on stance control   Exercise Reps/ Time Weight/ Level Completed  Today Comments   Toe taps  2x10  x Unilateral UE support to 2 finger light Ue support    Modified SLS  2x30s  x No UE support    3 way(cross over, fwd, lateral) taps to target  2x10 ea  x Light unilateral UE support to no UE support on 2nd set    Ball rolls F/B & lateral 20x ea  x Bilat 1 finger tip support    Forward lunges 2x10 ea  x Light to no UE support               Specific Instructions for next treatment: work on treadmill for high intensity gait training -- in alter g to lite gait. Focus on gait parameters to get equal step lengths and Wbing; stance control interventions     Assessment:    [x] Progressing toward goals. Began session with nustep to decreases stiffness prior to gait training. Completed gait training with harness with safety trying to progress gait mechanics and speed. Began with BUE support initially and tried to reduce. Difficulty trying to use unstable support with theraband. Better with unilateral UE support and able to progress speed and get greater total distance. Cues for heel strike. Finished session with SL stability exercises trying to reduce UE support to build stability for better stance control to improve swing phase. At times limited due to confidence but with encouragement can complete and is challenged. Pt notes fatigue by end of session. [] No change. [] Other:      [x] Patient would continue to benefit from skilled physical therapy services in order to: improve strength,mobiltiy and motor control that impacts balance and mobility. GOALS -- updated 8/16/22 & 9/28/22   Goals to Continue for POC:   3. Pt will increase gait speed to >0.8 m/s  With LRAD at mod I level to demonstrate decreased fall risk and optimize mobility to at community level.         - 6/15: limited increase in speed but pt is now mod IND with 2WW        - 7/21: progressing-- significant improvement in gait speed with 2WW to 0.67m/s & 0.46m/s with [facility]         -8/16: progressing with use of SPC -- will assess formally in upcoming sessions.         -9/28: progressing -- 0.5 m/s with [facility] distant supervision   4. Pt will improve score on ESCOBEDO balance scale by >/= 45/56 points  to improve overall balance stability and decrease fall risk with mobility. - 6/15: progressing -- 33/56          - 7/21: progressing -- improves score ot 38/56        -8/16: progressing -- 44/56 greater improvement         -9/28: plateau in progress at 44/56  5. Pt will increase strength of all major muscle groups of the LEs to 4/5 or greater demonstrating improved strength needed to maximize safety and efficiency with mobility tasks such as gait, transfers and stairs. - 6/15: progressing  -7/21; Progressing -- great progress made with LE strength, especially L ankle strength now all being above 3/5  - 8/16: progressing -- almost met but still working on L ankle eversion strength   -9/28: progressing -- will formally assess at later session   6. Pt will be independent with home program addressing strength, flexibility and balance to maximize gains made in therapy to improve overall functional capacity for mobility.     -6/15: progressing -- will continue to update in POC  -7/21: progressing -- will continue to update in POC  -8/16: progressing -- will continue to update in POC, notes good compliance   - 9/28: progressing -- will continue to update in POC, notes good compliance    7. Pt will improve score on OPTIMAL to 38/90 to demonstrate functional improvements with ADLs.                     -9/28: progressing -- 39/90    8. NEW GOAL 7/12: Pt will improve AROM in of cervical ROM to >60 bilaterally to improve ability to visually scan while driving.              -7/21; Progressing               -8/16: progressing -- 52 deg to R this date; 48 deg to L this date               - 9/28: progressing -- reaches to the R, limited on L   9.  New goal 8/16/22: Pt will be able to navigate curbs, uneven surfaces, and inclines/declines with [employer] with distant supervision to ensure She can safely navigate the community & get outdoors more. -9/28: progressing -- completes with SBA in sessions   10. New goal 8/16/22: Pt will be able to ambulate >500ft with 6mWT with SPC to demonstrate improved endurance and efficiency needed for community ambulation.               - 9/28: progressing -- reaches 440ft with SPC (178ft improvement)   11. NEW GOAL 9/28/22: Pt will be able to ambulate for 10 minutes straight with fluid gait pattern to demonstrate good endurance needed to optimize community ambulation such as at grocery store or for visiting family. Patient stated Goals: to walk without a walker, improve balance     Pt. Education:  [x] Yes  [] No  [x] Reviewed Prior HEP/Ed  Method of Education: [x] Verbal  [] Demo  [] Written  Comprehension of Education:  [x] Verbalizes understanding. [] Demonstrates understanding. [] Needs review. [] Demonstrates/verbalizes HEP/Ed previously given. Access Code: [email or web address]  URL: [email or web address]  Date: 08/26/2022  Prepared by: Tressa Matters    Exercises  Long Sitting Ankle Eversion with Resistance - 1 x daily - 7 x weekly - 3 sets - 10 reps  Long Sitting Ankle Plantar Flexion with Resistance - 1 x daily - 7 x weekly - 3 sets - 10 reps  Long Sitting Ankle Inversion with Resistance - 1 x daily - 7 x weekly - 3 sets - 10 reps  Long Sitting Ankle Dorsiflexion with Anchored Resistance - 1 x daily - 7 x weekly - 3 sets - 10 reps      PLAN:    [x] Continue per POC        Treatment Charges: Mins Units   []  Modalities     [x]  Ther Exercise 10 1   []  Manual Therapy     []  Ther Activities     []  Aquatics     [x]  Neuromuscular 34 2   [] Vasocompression     [x] Gait Training 12 1   [] Dry needling        [] 1 or 2 muscles        [] 3 or more muscles     []  Other     Total Treatment time 56 4   * KX modifier needed* Time In: 4:00p  Time Out:  4:56      Electronically signed by:  Kirsten Forrester PT

## 2022-10-19 NOTE — PROGRESS NOTES
[address]  Rehabilitation Services  Occupational Therapy  Rehabilitation Treatment Note  Date: 10/19/22  Patient Name: Pamella Mcnair    MRN: [id number]  Account: [de-identified]   : 1960  (65 y.o.) Gender: female     Insurance Information:  [place]ic[place] - based on [place] -- [facility]      OT Visit Information:  Visit #: 7     Referring Physician: Brittney Doty MD    Neurosurgeon: Fred Mark DO                           Medical Diagnosis:   D57.272T (ICD-10-CM) - Spinal cord injury, cervical region, sequela ([address]   G82.54 (ICD-10-CM) - Incomplete quadriplegia at C5-6 level ([address]   G83.4 (ICD-10-CM) - Cauda equina syndrome ([address]   Rehab Codes: R25 pain , R53.1 weakness, Z74.0 reduced mobility, R29.6 high fall risk, R27.8 lack of coordination  Date of symptom onset: 21 -- cauda equina; 22 Cervical surgeries   Next 's appt.: 22 with PMR Dr. Jamas Lennox      Reason for Referral: Spinal cord injury, cervical region, sequela ([address] Pamella Mcnair, 65 y.o., female (Nontraumatic cervical spinal cord injury). Patient with recent cervical spinal cord injury related to critical cervical stenosis who was admitted to [facility] for inpatient acute rehabilitation from 4/15/22 - 22. Patient c/o reduced endurance which is gradually improving with parasthesias in B hands & feet. Patient currently being followed by Pain Management and has been followed for the past 11 yeas with patient currently prescribed Hydrocodone  every 6 hours & Methadone 2x/day. Patient with a history of anxiety and is currently taking an anti-anxiety medication.      Objective:     Results of Initial  Evaluation : Patient scores on clinical visual perceptual evaluation revealed mild impairment in dynamic vision in the areas of visual pursuits and saccades; decreased ability to attend to simultaneous bilateral visual stimuli; decreased proprioceptive and kinesthetic input in distal R LE for acceleration and braking, mikaela positioning and consistent safe negotiation of intersections on simulated assessments of driving. Results of Simulated Assessments w/Seat & Back Cushions (to maximize clearance over wheel due to decreased thoracic extension) 10/19/2022: Patient with occasional brake/accelerator pedal confusion; patient with decreased hazard avoidance skills, mikaela positioning; speed control; steering reaction time and pedal response speed. Simulated Assessment 10/19/2022: Road Test #1 [address]: Patient able to avoid all potential collisions throughout the drive; patient able to maintain speed control time and mikaela positioning with patient able to safely negotiate 10/10 intersections with an average pedal response speed of 0.6 sec which is [facility] with an average speed of 43 mph. Results of [address] [place] [address] [address] 10/19/2022: Results of behind wheel intervention on-road in light-to-moderate traffic on [address] & [facility] revealed decreased  performance skills in the areas of mirror checks; stopping distance; mikaela changes; stopping; and planning ahead. Subjective: Patient reporting high anxiety with completing initial on-road evaluation behind the wheel. Behind-Wheel  Intervention On-Road 10/19/2022              Ability to enter/exit vehicle: Modified independent  Ability to maintain 3 visual clearance & 10-12 clearance from steering wheel: Independent  Ability to adjust seat & mirrors and fasten/unfasten seatbelt independently: Independent     Non-Traffic Driving:   Ability to operate the primary controls independently that included the steering wheel, accelerator & brake:  Independent  Ability to operate secondary controls that included: starting the vehicle; shifting the gears; operating the turn signal and, backing maneuvers: Independent     Driving Environment:              Traffic Density: Light-to-moderate Type of Roadway: [facility], [address], [address]      Performance Skills:     Visual Skills  Mirror checks: Independent              Scans Environment: Independent              Blind Spot Checks: Independent              Identifies Road Signs & Signals: Independent              Checks Cross Traffic: Independent     Vehicle Position  Gap Acceptance: Independent              Following/Stopping Distance: Independent              Mikaela Position: Independent              Turns into [place]per [place]: Independent              Mikaela Changes: Independent     Vehicle Handling         Speed Control: Independent  Steering: Independent              Acceleration:Decreased ability to accelerate smoothly  Braking: Decreased ability to brake smoothly  Stopping: Decreased ability to stop smoothly              Turning: Independent                          Yielding: Independent                          Merging: Independent  Use of Turn Signal: Independent     Strategic Skills                          Divided Attention while Driving: Independent                          Anticipation of Potential Hazards: Independent                          Planning: Independent                          Decision Making: Independent                          Recall of Visual/Verbal Information: Independent                          Follows Directions: Independent                          Obeys Rules of the Road: Independent                          Rate of Processing Speed: Independent      Education & application of defensive driving skills: Patient educated regarding defensive driving skills with patient demonstrating good ability to apply behind the wheel on road.     Assessment: Results of behind wheel intervention on-road in light-to-moderate traffic on [address] & [facility] revealed good  performance skills in the areas of visual skills, vehicle positioning and strategic skills with decreased ability to brake, accelerate & stop smoothly. Plan: Plan to advance patient to [address] with increased traffic next session on-road behind-the-wheel in the [facility] ([facility]) modified vehicle.     Treatment Charges:  Minutes Units Time [address] 4 03:00pm-04:00pm   Total Treatment Time:  60

## 2022-10-21 ENCOUNTER — HOSPITAL ENCOUNTER (OUTPATIENT)
Dept: PHYSICAL THERAPY | Age: 62
Setting detail: THERAPIES SERIES
Discharge: HOME | End: 2022-10-21
Payer: MEDICARE

## 2022-10-21 NOTE — FLOWSHEET NOTE
[] [facility]) - [facility] & Therapy  [address]  [place]: [phone]   F: [phone]     Physical Therapy Cancel/No Show note    Date: 10/21/2022  Patient: Tommy Knott  : 1960  MRN: [id number]    Visit Count: 40/47  Cancels/No Shows to date:     For today's appointment patient:    [x]  Cancelled    [] Rescheduled appointment    [] No-show     Reason given by patient:    [x]  Patient ill- per , pt called to cancel due to not feeling well from a medication change.     []  Conflicting appointment    [] No transportation      [] Conflict with work    [] No reason given    [] Weather related    [] [id number]    [] Other:      Comments:        [] Next appointment was confirmed    Electronically signed by: Diana Lim PTA

## 2022-10-24 ENCOUNTER — TELEPHONE (OUTPATIENT)
Dept: PRIMARY CARE CLINIC | Age: 62
End: 2022-10-24

## 2022-10-24 ENCOUNTER — HOSPITAL ENCOUNTER (OUTPATIENT)
Dept: OCCUPATIONAL THERAPY | Age: 62
Setting detail: THERAPIES SERIES
Discharge: HOME | End: 2022-10-24
Payer: MEDICARE

## 2022-10-24 ENCOUNTER — INITIAL CONSULT (OUTPATIENT)
Dept: VASCULAR SURGERY | Age: 62
End: 2022-10-24
Payer: MEDICARE

## 2022-10-24 ENCOUNTER — APPOINTMENT (OUTPATIENT)
Dept: PHYSICAL THERAPY | Age: 62
End: 2022-10-24
Payer: MEDICARE

## 2022-10-24 VITALS
SYSTOLIC BLOOD PRESSURE: 139 MMHG | OXYGEN SATURATION: 98 % | WEIGHT: 144.5 LBS | RESPIRATION RATE: 20 BRPM | DIASTOLIC BLOOD PRESSURE: 76 MMHG | TEMPERATURE: 98.2 F | HEIGHT: 61 IN | BODY MASS INDEX: 27.28 KG/M2

## 2022-10-24 DIAGNOSIS — I89.0 LYMPHEDEMA: Primary | ICD-10-CM

## 2022-10-24 PROCEDURE — 99203 OFFICE O/P NEW LOW 30 MIN: CPT | Performed by: SURGERY

## 2022-10-24 PROCEDURE — 3017F COLORECTAL CA SCREEN DOC REV: CPT | Performed by: SURGERY

## 2022-10-24 PROCEDURE — G8419 CALC BMI OUT NRM PARAM NOF/U: HCPCS | Performed by: SURGERY

## 2022-10-24 PROCEDURE — G8427 DOCREV CUR MEDS BY ELIG CLIN: HCPCS | Performed by: SURGERY

## 2022-10-24 PROCEDURE — 1036F TOBACCO NON-USER: CPT | Performed by: SURGERY

## 2022-10-24 PROCEDURE — G8482 FLU IMMUNIZE ORDER/ADMIN: HCPCS | Performed by: SURGERY

## 2022-10-24 PROCEDURE — 97537 COMMUNITY/WORK REINTEGRATION: CPT

## 2022-10-24 RX ORDER — BUSPIRONE HYDROCHLORIDE 30 MG/1
TABLET ORAL
Qty: 60 TABLET | Refills: 3 | Status: SHIPPED | OUTPATIENT
Start: 2022-10-24

## 2022-10-24 ASSESSMENT — ENCOUNTER SYMPTOMS
VOICE CHANGE: 0
SHORTNESS OF BREATH: 0
EYE PAIN: 0
TROUBLE SWALLOWING: 0
CHEST TIGHTNESS: 0
VOMITING: 0
ABDOMINAL DISTENTION: 0
ABDOMINAL PAIN: 0
COLOR CHANGE: 0
COUGH: 0

## 2022-10-24 NOTE — PROGRESS NOTES
[address]  Rehabilitation Services  Occupational Therapy  Rehabilitation Treatment Note  Date: 10/24/22  Patient Name: Adolph Clarke    MRN: [id number]  Account: [de-identified]   : 1960  (65 y.o.) Gender: female     Insurance Information:  Medicare - based on [place] -- [facility]      OT Visit Information:  Visit #: 8     Referring Physician: Mark Raza MD    Neurosurgeon: Shona Turner DO                           Medical Diagnosis:   C63.633R (ICD-10-CM) - Spinal cord injury, cervical region, sequela ([address]   G82.54 (ICD-10-CM) - Incomplete quadriplegia at C5-6 level ([address]   G83.4 (ICD-10-CM) - Cauda equina syndrome ([address]   Rehab Codes: R25 pain , R53.1 weakness, Z74.0 reduced mobility, R29.6 high fall risk, R27.8 lack of coordination  Date of symptom onset: 21 -- cauda equina; 22 Cervical surgeries   Next 's appt.: 22 with PMR Dr. Stephanie Ruiz      Reason for Referral: Spinal cord injury, cervical region, sequela ([address] Adolph Clarke, 65 y.o., female (Nontraumatic cervical spinal cord injury). Patient with recent cervical spinal cord injury related to critical cervical stenosis who was admitted to [facility] for inpatient acute rehabilitation from 4/15/22 - 22. Patient c/o reduced endurance which is gradually improving with parasthesias in B hands & feet. Patient currently being followed by Pain Management and has been followed for the past 11 yeas with patient currently prescribed Hydrocodone  every 6 hours & Methadone 2x/day. Patient with a history of anxiety and is currently taking an anti-anxiety medication.      Objective:     Results of Initial  Evaluation : Patient scores on clinical visual perceptual evaluation revealed mild impairment in dynamic vision in the areas of visual pursuits and saccades; decreased ability to attend to simultaneous bilateral visual stimuli; decreased proprioceptive and kinesthetic input in distal R LE for acceleration and braking, mikaela positioning and consistent safe negotiation of intersections on simulated assessments of driving. Results of Simulated Assessments w/Seat & Back Cushions (to maximize clearance over wheel due to decreased thoracic extension) 10/19/2022: Patient with occasional brake/accelerator pedal confusion; patient with decreased hazard avoidance skills, mikaela positioning; speed control; steering reaction time and pedal response speed. Simulated Assessment 10/19/2022: Road Test #1 [address]: Patient able to avoid all potential collisions throughout the drive; patient able to maintain speed control time and mikaela positioning with patient able to safely negotiate 10/10 intersections with an average pedal response speed of 0.6 sec which is [facility] with an average speed of 43 mph. Results of Most Recent Behind-Wheel  Intervention On-Road 10/19/2022: Results of behind wheel intervention on-road in light-to-moderate traffic on [address] & [facility] revealed good  performance skills in the areas of visual skills, vehicle positioning and strategic skills with decreased ability to brake, accelerate & stop smoothly. Subjective: Patient reporting high anxiety with completing initial on-road evaluation behind the wheel.      Behind-Wheel  Intervention On-Road 10/24/2022              Ability to enter/exit vehicle: Modified independent  Ability to maintain 3 visual clearance & 10-12 clearance from steering wheel: Independent  Ability to adjust seat & mirrors and fasten/unfasten seatbelt independently: Required verbal cueing to adjust mirrors     Non-Traffic Driving:   Ability to operate the primary controls independently that included the steering wheel, accelerator & brake: Patient with occasional decreased pedal/brake discrimination   Ability to operate secondary controls that included: starting the vehicle; shifting the gears; operating the turn signal and, backing maneuvers: Independent     Driving Environment:              Traffic Density: Light-to-moderate              [facility] [address], [address]      Performance Skills:     Visual Skills:   Mirror checks: Patient with decreased ability to regulate visual information in her rear view mirror while turning              Scans Environment: Independent              Blind Spot Checks: Independent              Identifies Road Signs & Signals: Independent              Checks Cross Traffic: Independent     Vehicle Position  Gap Acceptance: Independent              Following/Stopping Distance: Independent              Mikaela Position: Independent              Turns into Proper Mikaela: Independent              Mikaela Changes: Independent     Vehicle Handling         Speed Control: Independent  Steering: Independent              Acceleration:Decreased ability to accelerate smoothly  Braking: Decreased ability to brake smoothly; occasional decreased pedal discrimination  Stopping: Decreased ability to stop smoothly              Turning:                          Yielding: Independent                          Merging: Independent  Use of Turn Signal: Independent     Strategic Skills                          Divided Attention while Driving: Independent                          Anticipation of Potential Hazards: Independent                          Planning: Independent                          Decision Making: Independent                          Recall of Visual/Verbal Information: Independent                          Follows Directions: Independent                          Obeys Rules of the Road: Independent                          Rate of Processing Speed: Independent      Education & application of defensive driving skills: Patient educated regarding defensive driving skills with patient demonstrating good ability to apply behind the wheel on road.      Assessment: Results of behind wheel intervention on-road in light-to-moderate traffic on [address] & [place] revealed good  performance skills in the areas of visual skills, vehicle positioning and strategic skills with decreased ability to brake & accelerate smoothly and consistent pedal/brake discrimination with decreased ability to regulate visual information in rear view mirror while turning. Plan: Plan to target skills behind the wheel to include ability to consistently discriminate between brake & accelerator pedals, smooth acceleration and braking and ability to regulate incoming visual information in rear view mirror while turning.      Treatment Charges:  Minutes Units Time [address] 4 03:00pm-04:00pm   Total Treatment Time:  60

## 2022-10-24 NOTE — PROGRESS NOTES
[address] AND VASCULAR  [address] [facility] [address]  Dept: [phone]     Patient: Rudolph Yeboah  : 1960  MRN: [id number]  DOS: 10/24/2022    Referring provider:  Rick Rajan MD         HPI:  Rudolph Yeboah is a 65 y.o. female who comes to the office for the first time regarding right upper extremity lymphedema. She had mastectomy approximately 30 years ago with radiation and chemotherapy. She has had no evidence of recurrence. Several months ago she started having swelling of the right upper extremity which has persisted and has worsened. She had a duplex examination of the right upper extremity revealing phasic signals throughout and no evidence of noncompressible veins.   Past Medical History:   Diagnosis Date    Anxiety     Arthritis     At maximum risk for fall 2021    caudal equina syndrome and cervical stenosis    Cancer (HCC)     right breast    Cauda equina syndrome ([address] 2021    neuropathy, mobility difficulty, incontinance    Cervical stenosis of spine 2021    GERD (gastroesophageal reflux disease)     History of stomach ulcers     Hypertension     Dr. Renard Hayes    Hypothyroidism     Lumbar neuritis     Post laminectomy syndrome     Spinal deformity 2021    cervical and lumbar    Thyroid disease     Uses wheelchair     Wears dentures     Wellness examination     Dr. Renard Hayes     Family History   Problem Relation Age of Onset    Hypertension Mother     Heart Disease Mother     Cancer Mother       Social History     Socioeconomic History    Marital status: Single     Spouse name: Not on file    Number of children: Not on file    Years of education: Not on file    Highest education level: Not on file   Occupational History    Not on file   Tobacco Use    Smoking status: Former     Packs/day: 0.50     Years: 30.00     Pack years: 15.00     Types: Cigarettes     Quit date:      Years since quittin.8    Smokeless tobacco: Never   Vaping Use    Vaping Use: Every day    Substances: Nicotine   Substance and Sexual Activity    Alcohol use: Yes     Alcohol/week: 0.0 standard drinks     Comment: weekend at times    Drug use: Yes     Types: Prescription     Comment: Methadone from [facility]    Sexual activity: Not on file   Other Topics Concern    Not on file   Social History Narrative    Not on file     Social Determinants of Health     Financial Resource Strain: Low Risk     Difficulty of Paying Living Expenses: Not very hard   Food Insecurity: No Food Insecurity    Worried About Running Out of Food in the Last Year: Never true    Ran Out of Food in the Last Year: Never true   Transportation Needs: No Transportation Needs    Lack of Transportation (Medical): No    Lack of Transportation (Non-Medical): No   Physical Activity: Inactive    Days of Exercise per Week: 0 days    Minutes of Exercise per Session: 0 min   Stress: No Stress Concern Present    Feeling of Stress : Only a little   Social Connections: Socially Isolated    Frequency of Communication with Friends and Family:  Three times a week    Frequency of Social Gatherings with Friends and Family: Twice a week    Attends [employer] Services: Never    Active Member of Clubs or Organizations: No    Attends Club or Organization Meetings: Never    Marital Status:    Intimate Partner Violence: Not on file   Housing Stability: [address] to Pay for Housing in the Last Year: No    Number of [place] in the Last Year: 1    Unstable Housing in the Last Year: No      Past Surgical History:   Procedure Laterality Date    BACK SURGERY      lower back x 3, cervical- x 1: C4- C6, 2014     BREAST SURGERY Right     mastectomy with reconstruction    CERVICAL FUSION N/A 2022    C5 CORPECTOMY, USE OF INTRAOPERATIVE CERVICAL TRACTION performed by Andres Raymond DO at [address] [place] N/A 2022    POSTERIOR FIXATION C2-C7 performed by Flor Vital DO at [address] [place] [address]  about 2018    HERNIA REPAIR Bilateral     inguinal    HYSTERECTOMY, TOTAL ABDOMINAL (CERVIX REMOVED)      LUMBAR SPINE SURGERY N/A 12/30/2021    LUMBAR LAMINECTOMY L2-S1 performed by Flor Vital DO at [address], RADICAL      OTHER SURGICAL HISTORY  04/12/2022    C5 CORPECTOMY, USE OF INTRAOPERATIVE CERVICAL TRACTION (N/A Spine Cervical)       Review of Systems   Constitutional:  Negative for activity change, fever and unexpected weight change. HENT:  Negative for trouble swallowing and voice change. Eyes:  Negative for pain and visual disturbance. Respiratory:  Negative for cough, chest tightness and shortness of breath. Cardiovascular:  Negative for chest pain and palpitations. Gastrointestinal:  Negative for abdominal distention, abdominal pain and vomiting. Endocrine: Negative for cold intolerance and heat intolerance. Genitourinary:  Negative for dysuria, flank pain and hematuria. Musculoskeletal:  Negative for joint swelling and neck pain. Skin:  Negative for color change and rash. Allergic/Immunologic: Negative for immunocompromised state. Neurological:  Negative for syncope, speech difficulty, weakness, numbness and headaches. Hematological:  Negative for adenopathy. Psychiatric/Behavioral:  Negative for behavioral problems and suicidal ideas. Vitals:    10/24/22 1042   BP: 139/76   Site: Left Wrist   Position: Sitting   Resp: 20   Temp: 98.2 °F (36.8 °C)   TempSrc: Temporal   SpO2: 98%   Weight: 144 lb 8 oz (65.5 kg)   Height: 5' 1\" (1.549 m)          Physical Exam  Constitutional:       General: She is not in acute distress. HENT:      Mouth/Throat:      Mouth: Mucous membranes are moist.      Pharynx: Oropharynx is clear. Eyes:      General: No scleral icterus. Extraocular Movements: Extraocular movements intact.       Conjunctiva/sclera: Conjunctivae normal.   Neck:      Thyroid: No thyroid mass or thyromegaly. Cardiovascular:      Rate and Rhythm: Normal rate and regular rhythm. Heart sounds: No murmur heard. Comments: Her right upper extremity is edematous consistent with lymphatic edema. The hand is not edematous. The forearm elbow and distal brachium are edematous compared to the left. She has no evidence of superficial thrombophlebitis. She has a normal distal radial pulse bilaterally and normal distal pulses bilaterally in the feet. Pulmonary:      Effort: No respiratory distress. Breath sounds: No rales. Abdominal:      General: There is no distension. Palpations: There is no mass. Tenderness: There is no abdominal tenderness. There is no guarding. Musculoskeletal:      Cervical back: No rigidity or tenderness. Lymphadenopathy:      Cervical: No cervical adenopathy. Skin:     Coloration: Skin is not jaundiced. Findings: No rash. Neurological:      General: No focal deficit present. Mental Status: She is alert and oriented to person, place, and time. Cranial Nerves: No cranial nerve deficit. Psychiatric:         Mood and Affect: Mood normal.       Assessment:  No diagnosis found. Plan: At this time I think the best option is the lymphedema clinic and compression garment. A compression garment has already been ordered as has referral to the lymphedema clinic. I will rely on them to work on her to get her back down the size and the compression garment will be a lifelong medical supply for her. She understands and agrees and we can see her back as needed.     Electronically signed by:  Darryle Edis, MD

## 2022-10-24 NOTE — TELEPHONE ENCOUNTER
She was put on Cipro 10/17. She called to let you know she is stopping it today. It has given her diarrhea. She is in tears when she goes to the bathroom it hurts to wipe. She see's vascular today. Says she can't continue it, took it for 7 days and the swelling hasn't went down at all in her arm.

## 2022-10-26 ENCOUNTER — HOSPITAL ENCOUNTER (OUTPATIENT)
Dept: OCCUPATIONAL THERAPY | Age: 62
Setting detail: THERAPIES SERIES
Discharge: HOME | End: 2022-10-26
Payer: MEDICARE

## 2022-10-26 ENCOUNTER — HOSPITAL ENCOUNTER (OUTPATIENT)
Dept: PHYSICAL THERAPY | Age: 62
Setting detail: THERAPIES SERIES
Discharge: HOME | End: 2022-10-26
Payer: MEDICARE

## 2022-10-26 PROCEDURE — 97116 GAIT TRAINING THERAPY: CPT

## 2022-10-26 PROCEDURE — 97537 COMMUNITY/WORK REINTEGRATION: CPT

## 2022-10-26 PROCEDURE — 97112 NEUROMUSCULAR REEDUCATION: CPT

## 2022-10-26 NOTE — FLOWSHEET NOTE
in upcoming sessions.         -9/28: progressing -- 0.5 m/s with [facility] distant supervision   4. Pt will improve score on ESCOBEDO balance scale by >/= 45/56 points  to improve overall balance stability and decrease fall risk with mobility. - 6/15: progressing -- 33/56          - 7/21: progressing -- improves score ot 38/56        -8/16: progressing -- 44/56 greater improvement         -9/28: plateau in progress at 44/56  5. Pt will increase strength of all major muscle groups of the LEs to 4/5 or greater demonstrating improved strength needed to maximize safety and efficiency with mobility tasks such as gait, transfers and stairs. - 6/15: progressing  -7/21; Progressing -- great progress made with LE strength, especially L ankle strength now all being above 3/5  - 8/16: progressing -- almost met but still working on L ankle eversion strength   -9/28: progressing -- will formally assess at later session   6. Pt will be independent with home program addressing strength, flexibility and balance to maximize gains made in therapy to improve overall functional capacity for mobility.     -6/15: progressing -- will continue to update in POC  -7/21: progressing -- will continue to update in POC  -8/16: progressing -- will continue to update in POC, notes good compliance   - 9/28: progressing -- will continue to update in POC, notes good compliance    7. Pt will improve score on OPTIMAL to 38/90 to demonstrate functional improvements with ADLs.                     -9/28: progressing -- 39/90    8. NEW GOAL 7/12: Pt will improve AROM in of cervical ROM to >60 bilaterally to improve ability to visually scan while driving.              -7/21; Progressing               -8/16: progressing -- 52 deg to R this date; 48 deg to L this date               - 9/28: progressing -- reaches to the R, limited on L   9.  New goal 8/16/22: Pt will be able to navigate curbs, uneven surfaces, and inclines/declines with SPC with distant supervision to ensure She can safely navigate the community & get outdoors more. -9/28: progressing -- completes with SBA in sessions   10. New goal 8/16/22: Pt will be able to ambulate >500ft with 6mWT with SPC to demonstrate improved endurance and efficiency needed for community ambulation.               - 9/28: progressing -- reaches 440ft with SPC (178ft improvement)   11. NEW GOAL 9/28/22: Pt will be able to ambulate for 10 minutes straight with fluid gait pattern to demonstrate good endurance needed to optimize community ambulation such as at grocery store or for visiting family. Patient stated Goals: to walk without a walker, improve balance     Pt. Education:  [x] Yes  [] No  [x] Reviewed Prior HEP/Ed  Method of Education: [x] Verbal  [] Demo  [] Written  Comprehension of Education:  [x] Verbalizes understanding. [] Demonstrates understanding. [] Needs review. [] Demonstrates/verbalizes HEP/Ed previously given. Access Code: [email or web address]  URL: [email or web address]  Date: 08/26/2022  Prepared by: Shayla Bone    Exercises  Long Sitting Ankle Eversion with Resistance - 1 x daily - 7 x weekly - 3 sets - 10 reps  Long Sitting Ankle Plantar Flexion with Resistance - 1 x daily - 7 x weekly - 3 sets - 10 reps  Long Sitting Ankle Inversion with Resistance - 1 x daily - 7 x weekly - 3 sets - 10 reps  Long Sitting Ankle Dorsiflexion with Anchored Resistance - 1 x daily - 7 x weekly - 3 sets - 10 reps      PLAN:    [x] Continue per POC        Treatment Charges: Mins Units   []  Modalities     [x]  Ther Exercise 5 -   []  Manual Therapy     []  Ther Activities     []  Aquatics     [x]  Neuromuscular 37 2   [] Vasocompression     [x] Gait Training 10 1   [] Dry needling        [] 1 or 2 muscles        [] 3 or more muscles     []  Other     Total Treatment time 52 3   * KX modifier needed*   *5' on nustep not counted this session*    Time In: 2021 Time Out: 1357      Electronically signed by:  John Benton, PTA

## 2022-10-26 NOTE — PROGRESS NOTES
[address]  Rehabilitation Services  Occupational Therapy  Rehabilitation Treatment Note  Date: 10/26/22  Patient Name: Soheila Mccarty    MRN: [id number]  Account: [de-identified]   : 1960  (65 y.o.) Gender: female     Insurance Information:  Medicare - [place] -- [facility]      OT Visit Information:  Visit #: 8     Referring Physician: Asuncion Santos MD    Neurosurgeon: Monse Dc DO                           Medical Diagnosis:   K98.070P (ICD-10-CM) - Spinal cord injury, cervical region, sequela ([address]   G82.54 (ICD-10-CM) - Incomplete quadriplegia at C5-6 level ([address]   G83.4 (ICD-10-CM) - Cauda equina syndrome ([address]   Rehab Codes: R25 pain , R53.1 weakness, Z74.0 reduced mobility, R29.6 high fall risk, R27.8 lack of coordination  Date of symptom onset: 21 -- cauda equina; 22 Cervical surgeries   Next 's appt.: 22 with PMR Dr. Roel Anand      Reason for Referral: Spinal cord injury, cervical region, sequela ([address] Soheila Mccarty, 65 y.o., female (Nontraumatic cervical spinal cord injury). Patient with recent cervical spinal cord injury related to critical cervical stenosis who was admitted to [facility] for inpatient acute rehabilitation from 4/15/22 - 22. Patient c/o reduced endurance which is gradually improving with parasthesias in B hands & feet. Patient currently being followed by Pain Management and has been followed for the past 11 yeas with patient currently prescribed Hydrocodone  every 6 hours & Methadone 2x/day. Patient with a history of anxiety and is currently taking an anti-anxiety medication. Subjective: Patient has an appointment with lymphedema specialist in Nov. Patient reporting extreme fatigue after walking 985 steps on treadmill today.     Objective:     Results of Initial  Evaluation : Patient scores on clinical visual perceptual evaluation revealed mild impairment in dynamic vision in the areas of visual pursuits and saccades; decreased ability to attend to simultaneous bilateral visual stimuli; decreased proprioceptive and kinesthetic input in distal R LE for acceleration and braking, mikaela positioning and consistent safe negotiation of intersections on simulated assessments of driving. Results of Simulated Assessments w/Seat & Back Cushions (to maximize clearance over wheel due to decreased thoracic extension) 10/19/2022: Patient with occasional brake/accelerator pedal confusion; patient with decreased hazard avoidance skills, mikaela positioning; speed control; steering reaction time and pedal response speed. Behind-Wheel  Intervention On-Road 10/24/2022: Results of behind wheel intervention on-road in light-to-moderate traffic on [address] & [facility] revealed good  performance skills in the areas of visual skills, vehicle positioning and strategic skills with decreased ability to brake & accelerate smoothly and consistent pedal/brake discrimination with decreased ability to regulate visual information in rear view mirror while turning. Hazard Avoidance [address]: Patient with 2 collisions throughout drive; patient with consistent adherence to all stops; patient able to control speed throughout drive time; patient crossed center 16x and out of mikaela 38% of drive time with patient able to safely negotiate 10/10 intersections with an average pedal response speed of 1.3 sec which is mildly delayed. Construction Zone Advanced Level Drive: Patient collided with 2 roadway obstacles on right & 1 construction vehicle throughout drive at an average speed of 21 mph and a pedal response speed of 1.1 sec which is [place] however occasional pedal discrimination difficulty noted.     Road Test #1 Advanced Level Drive: Patient able to avoid all potential collisions throughout the drive; patient able to maintain speed control and mikaela positioning; patient able to safely negotiate 10/10 intersections with an average pedal response speed of 0.6 sec which is [facility] with an average speed of 43 mph. Hazard Avoidance [address]: Patient cited for 1 traffic light throughout drive with patient able to safely negotiate 4/4 intersections with an average pedal response speed of 0.5 sec which is [facility]     Assessment: Patient with decreased hazard avoidance skills, consistent adherence to traffic lights, mikaela positioning & pedal response speed on simulated tests of driving. Plan: Plan to target skills  performance skills using simulation in the areas of hazard avoidance skills, consistent adherence to traffic lights, mikaela positioning & pedal response speed on simulated tests of driving and ability to brake & accelerate smoothly, consistent pedal/brake discrimination and ability to regulate visual information in rear view mirror while turning behind the wheel on road in the [facility]) modified vehicle.      Treatment Charges:  Minutes Units Time [address] 60 4 02:00pm-03:00pm   Total Treatment Time:  60

## 2022-10-31 ENCOUNTER — HOSPITAL ENCOUNTER (OUTPATIENT)
Dept: PHYSICAL THERAPY | Age: 62
Setting detail: THERAPIES SERIES
Discharge: HOME | End: 2022-10-31
Payer: MEDICARE

## 2022-10-31 ENCOUNTER — HOSPITAL ENCOUNTER (OUTPATIENT)
Dept: OCCUPATIONAL THERAPY | Age: 62
Setting detail: THERAPIES SERIES
Discharge: HOME | End: 2022-10-31
Payer: MEDICARE

## 2022-10-31 RX ORDER — DILTIAZEM HYDROCHLORIDE 180 MG/1
180 CAPSULE, COATED, EXTENDED RELEASE ORAL DAILY
Qty: 30 CAPSULE | Refills: 3 | Status: SHIPPED | OUTPATIENT
Start: 2022-10-31

## 2022-10-31 NOTE — PROGRESS NOTES
[address]   OCCUPATIONAL THERAPY MISSED TREATMENT NOTE    Date: 10/31/22  Patient Name: Emeterio Reyez       Room: Room/bed info not found  MRN: [id number]   Account #: [de-identified]    : 1960  (65 y.o.)  Gender: female                 REASON FOR MISSED TREATMENT: Patient called to cancel her appointment due to not feeling well.

## 2022-11-02 ENCOUNTER — HOSPITAL ENCOUNTER (OUTPATIENT)
Dept: PHYSICAL THERAPY | Age: 62
Setting detail: THERAPIES SERIES
Discharge: HOME | End: 2022-11-02
Payer: MEDICARE

## 2022-11-02 NOTE — FLOWSHEET NOTE
[] [address]  [place]: [phone]   F: [phone]     Physical Therapy Cancel/No Show note    Date: 2022  Patient: Samanta Babcock  : 1960  MRN: [id number]    Visit Count: 41/47  Cancels/No Shows to date:     For today's appointment patient:    [x]  Cancelled    [] Rescheduled appointment    [] No-show     Reason given by patient:    [x]  Patient ill    []  Conflicting appointment    [] No transportation      [] Conflict with work    [] No reason given    [] Weather related    [] [id number]    [] Other:      Comments:        [] Next appointment was confirmed    Electronically signed by: Merlinda Howell, PTA

## 2022-11-08 ENCOUNTER — APPOINTMENT (OUTPATIENT)
Dept: PHYSICAL THERAPY | Age: 62
End: 2022-11-08
Payer: MEDICARE

## 2022-11-09 ENCOUNTER — HOSPITAL ENCOUNTER (OUTPATIENT)
Dept: PHYSICAL THERAPY | Age: 62
Setting detail: THERAPIES SERIES
Discharge: HOME | End: 2022-11-09
Payer: MEDICARE

## 2022-11-09 PROCEDURE — 97116 GAIT TRAINING THERAPY: CPT

## 2022-11-09 NOTE — FLOWSHEET NOTE
[facility]   [address]   Phone: [phone]   Fax: [phone]    Physical Therapy Daily Treatment Note      Date:  2022  Patient Name:  Brigido Mccain    :  1960  MRN: [id number]  Physician: Sabina Mckeon MD                            Insurance: [place] - based on [place] -- [facility] Diagnosis:   N20.490Z (ICD-10-CM) - Spinal cord injury, cervical region, sequela ([facility]   G82.54 (ICD-10-CM) - Incomplete quadriplegia at C5-6 level ([facility] [address] [facility]   G83.4 (ICD-10-CM) - Cauda equina syndrome ([facility] [address]   Rehab Codes: R25 pain , R53.1 weakness, Z74.0 reduced mobility, R29.6 high fall risk, R27.8 lack of coordination, R26.89 gait abnormality   Date of symptom onset: 21 -- cauda equina; 22 Cervical surgeries   Next 's appt.: 10/12/22 with Neurosurgery; 22 with PMR Dr. Laura Winters   Total Visits: 42/47   Cx/Ns:4/0     Precautions: none at this time      Subjective:  Pt arrives with 2WW. She notes she is getting into lymphedema clinic 22. She is wearing the compression sleeve on the RUE. She denies any falls. She notes she was able to run the vacuum for the first time since her medical decline. She notes she is getting over the stomach bug and feeling better, but weak. Pain:  [x] Yes  [] No Location: Neck down to (B) Shoulders/UEs to knuckles in hands (B) LE's int feet and toes    Pain Rating: (0-10 scale) 6/10  Pain altered Tx:  [x] No  [] Yes  Action:  Comments:       Objective:     Exercises/Interventions   - Nustep x 5 min Lvl 5 --to reduce stiffness prior to gait training       - Gait training on  treadmill- 3x3 minues standing rest break in between trials.   Total 965ft   Time  Speed  Activity/goal Comments    Trial 1 3 min 1.1 mph Baseline  14.8% asymmetry    Trial 2 3 min  1.1 mph 10% step length symmetry  20% success; 11% asymmetry   Trial 3 3 min  1.1 mph  10% step length symmetry  38% success; 9.9% asymmetry      Gait Post :  2x laps of 90ft in clinic with SPC with SBA. Improved step lengths and fluidity of gait   2x laps of 90ft with 2# aw with SPC to challenge strength and power for more powerful ambulation. Standing -- focusing on stance control   Exercise Reps/ Time Weight/ Level Completed  Today Comments   Toe taps  2x10   Unilateral UE support to 2 finger light Ue support    Modified SLS  2x30s   No UE support    3 way(cross over, fwd, lateral) taps to target  2x10 ea   Light unilateral UE support to no UE support on 2nd set    Ball rolls F/B & lateral 20x ea   Bilat 1 finger tip support    Forward lunges 2x10 ea   Light to no UE support 10/26 5x2              Specific Instructions for next treatment: work on treadmill for high intensity gait training . Focus on gait parameters to get equal step lengths and Wbing; stance control interventions     Assessment:    [x] Progressing toward goals. Began session on NuStep to increase mobility and determine activity level to begin pt at since being sick in the past week. Tolerates nustep well at increased resistance. Followed with gait training using treadmill for massed practice of gait, endurance, and biofeedback about gait mechanics. Focused feedback to get symmetrical step lengths. Initially was shorter on R but will feedback able to improve reducing overall asymmetry from 14.8% to 9.9%. Carried this over to overground ambulation with SPC. Notable improved fluidity and speed of ambulation. Added ankle weights for increased challenge to improve power of ambulation to progress speed and also for muscular endurance. Overall pt is appropriately fatigued from session     [] No change. [] Other:      [x] Patient would continue to benefit from skilled physical therapy services in order to: improve strength,mobiltiy and motor control that impacts balance and mobility. GOALS -- updated 8/16/22 & 9/28/22   Goals to Continue for POC:   3.  Pt will increase gait speed to >0.8 m/s  With LRAD at mod I level to demonstrate decreased fall risk and optimize mobility to at community level. - 6/15: limited increase in speed but pt is now mod IND with 2WW        - 7/21: progressing-- significant improvement in gait speed with 2WW to 0.67m/s & 0.46m/s with [facility]         -8/16: progressing with use of SPC -- will assess formally in upcoming sessions.         -9/28: progressing -- 0.5 m/s with [facility] distant supervision   4. Pt will improve score on ESCOBEDO balance scale by >/= 45/56 points  to improve overall balance stability and decrease fall risk with mobility. - 6/15: progressing -- 33/56          - 7/21: progressing -- improves score ot 38/56        -8/16: progressing -- 44/56 greater improvement         -9/28: plateau in progress at 44/56  5. Pt will increase strength of all major muscle groups of the LEs to 4/5 or greater demonstrating improved strength needed to maximize safety and efficiency with mobility tasks such as gait, transfers and stairs. - 6/15: progressing  -7/21; Progressing -- great progress made with LE strength, especially L ankle strength now all being above 3/5  - 8/16: progressing -- almost met but still working on L ankle eversion strength   -9/28: progressing -- will formally assess at later session   6. Pt will be independent with home program addressing strength, flexibility and balance to maximize gains made in therapy to improve overall functional capacity for mobility.     -6/15: progressing -- will continue to update in POC  -7/21: progressing -- will continue to update in POC  -8/16: progressing -- will continue to update in POC, notes good compliance   - 9/28: progressing -- will continue to update in POC, notes good compliance    7. Pt will improve score on OPTIMAL to 38/90 to demonstrate functional improvements with ADLs.                     -9/28: progressing -- 39/90    8. NEW GOAL 7/12:  Pt will improve AROM in of cervical ROM to >60 bilaterally to improve ability to visually scan while driving.              -7/21; Progressing               -8/16: progressing -- 52 deg to R this date; 48 deg to L this date               - 9/28: progressing -- reaches to the R, limited on L   9. New goal 8/16/22: Pt will be able to navigate curbs, uneven surfaces, and inclines/declines with [facility] with distant supervision to ensure She can safely navigate the community & get outdoors more. -9/28: progressing -- completes with SBA in sessions   10. New goal 8/16/22: Pt will be able to ambulate >500ft with 6mWT with SPC to demonstrate improved endurance and efficiency needed for community ambulation.               - 9/28: progressing -- reaches 440ft with SPC (178ft improvement)   11. NEW GOAL 9/28/22: Pt will be able to ambulate for 10 minutes straight with fluid gait pattern to demonstrate good endurance needed to optimize community ambulation such as at grocery store or for visiting family. Patient stated Goals: to walk without a walker, improve balance     Pt. Education:  [x] Yes  [] No  [x] Reviewed Prior HEP/Ed  Method of Education: [x] Verbal  [] Demo  [] Written  Comprehension of Education:  [x] Verbalizes understanding. [] Demonstrates understanding. [] Needs review. [] Demonstrates/verbalizes HEP/Ed previously given. Access Code: [email or web address]  URL: [email or web address]  Date: 08/26/2022  Prepared by: Clelia Goldberg    Exercises  Long Sitting Ankle Eversion with Resistance - 1 x daily - 7 x weekly - 3 sets - 10 reps  Long Sitting Ankle Plantar Flexion with Resistance - 1 x daily - 7 x weekly - 3 sets - 10 reps  Long Sitting Ankle Inversion with Resistance - 1 x daily - 7 x weekly - 3 sets - 10 reps  Long Sitting Ankle Dorsiflexion with Anchored Resistance - 1 x daily - 7 x weekly - 3 sets - 10 reps      PLAN:    [x] Continue per POC        Treatment Charges: Mins Units   []  Modalities     [x]  Ther Exercise 5 -   []  Manual Therapy     []  Ther Activities     []  Aquatics     []  Neuromuscular     [] Vasocompression     [x] Gait Training 39 3   [] Dry needling        [] 1 or 2 muscles        [] 3 or more muscles     []  Other     Total Treatment time 44 3   * KX modifier needed*       Time In: 0421 Time Out:  1500      Electronically signed by:  Sedrick Azul PT

## 2022-11-11 ENCOUNTER — HOSPITAL ENCOUNTER (OUTPATIENT)
Dept: PHYSICAL THERAPY | Age: 62
Setting detail: THERAPIES SERIES
Discharge: HOME | End: 2022-11-11
Payer: MEDICARE

## 2022-11-11 PROCEDURE — 97116 GAIT TRAINING THERAPY: CPT

## 2022-11-11 NOTE — FLOWSHEET NOTE
[facility]   [address]   Phone: [phone]   Fax: [phone]    Physical Therapy Daily Treatment Note      Date:  2022  Patient Name:  An Ortiz    :  1960  MRN: [id number]  Physician: Sofya Donovan MD                            Insurance: Medicare - based on [place] -- [address] [place] [address] Diagnosis:   J17.806K (ICD-10-CM) - Spinal cord injury, cervical region, sequela ([facility] [address]   G82.54 (ICD-10-CM) - Incomplete quadriplegia at C5-6 level ([facility] [address]   G83.4 (ICD-10-CM) - Cauda equina syndrome ([facility] [address]   Rehab Codes: R25 pain , R53.1 weakness, Z74.0 reduced mobility, R29.6 high fall risk, R27.8 lack of coordination, R26.89 gait abnormality   Date of symptom onset: 21 -- cauda equina; 22 Cervical surgeries   Next 's appt.: 10/12/22 with Neurosurgery; 22 with PMR Dr. Parker Gonsalez   Total Visits: 43/47   Cx/Ns:4/0     Precautions: none at this time      Subjective:  Pt arrives with 2WW. She notes she is feeling tight this morning. She denies any soreness. She notes she is still using the cane at home. Denies any falls. Pain:  [x] Yes  [] No Location: Neck down to (B) Shoulders/UEs to knuckles in hands (B) LE's int feet and toes    Pain Rating: (0-10 scale) 6/10  Pain altered Tx:  [x] No  [] Yes  Action:  Comments:       Objective:     Exercises/Interventions   - Nustep x 5 min Lvl 5 --to reduce stiffness prior to gait training       - Gait training on  treadmill- 3x3 minues standing rest break in between trials.      Time  Speed  Activity/goal Comments    Trial 1 3 min 1.2 mph 10% step length symmetry  29% success; 10.3% avg asymmetry    Trial 2 3 min  1.2 mph 10% step length symmetry  17% success; 12.6% avg asymmetry    Trial 3 3 min  1.3 mph  10% step length symmetry  29%success; 9.6% avg asymmetry      Gait Training Post treadmill:  10mWT: 18.3s, 16.74s >> 0.6m/s   Obstacle course: walking on cobble stones, over foam, and weaving through cones -- x2 passes with SPC. Most difficulty with reactive balance on cobblestones. Limited in reps due to fatigue. Standing -- focusing on stance control   Exercise Reps/ Time Weight/ Level Completed  Today Comments   Toe taps  2x10   Unilateral UE support to 2 finger light Ue support    Modified SLS  2x30s   No UE support    3 way(cross over, fwd, lateral) taps to target  2x10 ea   Light unilateral UE support to no UE support on 2nd set    Ball rolls F/B & lateral 20x ea   Bilat 1 finger tip support    Forward lunges 2x10 ea   Light to no UE support 10/26 5x2              Specific Instructions for next treatment: TAKE outside if nice out with [employer]-- work on treadmill for high intensity gait training . Focus on gait parameters to get equal step lengths and Wbing; stance control interventions     Assessment:    [x] Progressing toward goals. Began session on NuStep to increase mobility and reduce tightness to allow for better gait training. Continued to progress speed and work to more symmetrical step lengths using biofeedback on treadmill. Having improvements with the avg asymmetry being closer to 10%. On second bout less symmetrical likley d/t fatigue. Able to correct in third bout at increased speed. Followed up with assessment of gait speed. Pt much more fluid and stable with SPC and reaching 0.6m/s gait speed (0.1 m/s improvement). Finished with more obstacle navigation to simulate outdoors with SPC. Pt very hesitant on the cobble stones as this requires more reactive balance in which is a deficit area for this patient. She is very fatigued by the end of session that limits the reps. Feel that overall she has made progress but is reaching a plateau and is ready for discharge. [] No change. [] Other:      [x] Patient would continue to benefit from skilled physical therapy services in order to: improve strength,mobiltiy and motor control that impacts balance and mobility. GOALS -- updated 8/16/22 & 9/28/22   Goals to Continue for POC:   3. Pt will increase gait speed to >0.8 m/s  With LRAD at mod I level to demonstrate decreased fall risk and optimize mobility to at community level. - 6/15: limited increase in speed but pt is now mod IND with 2WW        - 7/21: progressing-- significant improvement in gait speed with 2WW to 0.67m/s & 0.46m/s with [facility]         -8/16: progressing with use of SPC -- will assess formally in upcoming sessions.         -9/28: progressing -- 0.5 m/s with [facility] distant supervision   4. Pt will improve score on ESCOBEDO balance scale by >/= 45/56 points  to improve overall balance stability and decrease fall risk with mobility. - 6/15: progressing -- 33/56          - 7/21: progressing -- improves score ot 38/56        -8/16: progressing -- 44/56 greater improvement         -9/28: plateau in progress at 44/56  5. Pt will increase strength of all major muscle groups of the LEs to 4/5 or greater demonstrating improved strength needed to maximize safety and efficiency with mobility tasks such as gait, transfers and stairs. - 6/15: progressing  -7/21; Progressing -- great progress made with LE strength, especially L ankle strength now all being above 3/5  - 8/16: progressing -- almost met but still working on L ankle eversion strength   -9/28: progressing -- will formally assess at later session   6. Pt will be independent with home program addressing strength, flexibility and balance to maximize gains made in therapy to improve overall functional capacity for mobility.     -6/15: progressing -- will continue to update in POC  -7/21: progressing -- will continue to update in POC  -8/16: progressing -- will continue to update in POC, notes good compliance   - 9/28: progressing -- will continue to update in POC, notes good compliance    7. Pt will improve score on OPTIMAL to 38/90 to demonstrate functional improvements with ADLs. -9/28: progressing -- 39/90    8. NEW GOAL 7/12: Pt will improve AROM in of cervical ROM to >60 bilaterally to improve ability to visually scan while driving.              -7/21; Progressing               -8/16: progressing -- 52 deg to R this date; 48 deg to L this date               - 9/28: progressing -- reaches to the R, limited on L   9. New goal 8/16/22: Pt will be able to navigate curbs, uneven surfaces, and inclines/declines with [facility] with distant supervision to ensure She can safely navigate the community & get outdoors more. -9/28: progressing -- completes with SBA in sessions   10. New goal 8/16/22: Pt will be able to ambulate >500ft with 6mWT with SPC to demonstrate improved endurance and efficiency needed for community ambulation.               - 9/28: progressing -- reaches 440ft with SPC (178ft improvement)   11. NEW GOAL 9/28/22: Pt will be able to ambulate for 10 minutes straight with fluid gait pattern to demonstrate good endurance needed to optimize community ambulation such as at grocery store or for visiting family. Patient stated Goals: to walk without a walker, improve balance     Pt. Education:  [x] Yes  [] No  [x] Reviewed Prior HEP/Ed  Method of Education: [x] Verbal  [] Demo  [] Written  Comprehension of Education:  [x] Verbalizes understanding. [] Demonstrates understanding. [] Needs review. [] Demonstrates/verbalizes HEP/Ed previously given. Access Code: [email or web address]  URL: [email or web address]  Date: 08/26/2022  Prepared by: Obie Pulido    Exercises  Long Sitting Ankle Eversion with Resistance - 1 x daily - 7 x weekly - 3 sets - 10 reps  Long Sitting Ankle Plantar Flexion with Resistance - 1 x daily - 7 x weekly - 3 sets - 10 reps  Long Sitting Ankle Inversion with Resistance - 1 x daily - 7 x weekly - 3 sets - 10 reps  Long Sitting Ankle Dorsiflexion with Anchored Resistance - 1 x daily - 7 x weekly - 3 sets - 10 reps      PLAN:    [x] Continue per POC        Treatment Charges: Mins Units   []  Modalities     [x]  Ther Exercise 5 -   []  Manual Therapy     []  Ther Activities     []  Aquatics     []  Neuromuscular     [] Vasocompression     [x] Gait Training 40 3   [] Dry needling        [] 1 or 2 muscles        [] 3 or more muscles     []  Other     Total Treatment time 45 3   * KX modifier needed*       Time In: 1000 Time Out:  8447       Electronically signed by:  Kellen Forde PT

## 2022-11-14 ENCOUNTER — HOSPITAL ENCOUNTER (OUTPATIENT)
Dept: OCCUPATIONAL THERAPY | Age: 62
Setting detail: THERAPIES SERIES
Discharge: HOME | End: 2022-11-14
Payer: MEDICARE

## 2022-11-14 ENCOUNTER — HOSPITAL ENCOUNTER (OUTPATIENT)
Dept: PHYSICAL THERAPY | Age: 62
Setting detail: THERAPIES SERIES
Discharge: HOME | End: 2022-11-14
Payer: MEDICARE

## 2022-11-14 PROCEDURE — 97116 GAIT TRAINING THERAPY: CPT

## 2022-11-14 PROCEDURE — 97537 COMMUNITY/WORK REINTEGRATION: CPT
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
## 2022-11-14 NOTE — FLOWSHEET NOTE
[facility]   [address]   Phone: [phone]   Fax: [phone]    Physical Therapy Daily Treatment Note      Date:  2022  Patient Name:  An Ortiz    :  1960  MRN: [id number]  Physician: Sofya Doonvan MD                            Insurance: Medicare - based on [place] -- [facility] Diagnosis:   S98.384J (ICD-10-CM) - Spinal cord injury, cervical region, sequela ([facility] [address]   G82.54 (ICD-10-CM) - Incomplete quadriplegia at C5-6 level ([facility] [address]   G83.4 (ICD-10-CM) - Cauda equina syndrome ([facility] [address]   Rehab Codes: R25 pain , R53.1 weakness, Z74.0 reduced mobility, R29.6 high fall risk, R27.8 lack of coordination, R26.89 gait abnormality   Date of symptom onset: 21 -- cauda equina; 22 Cervical surgeries   Next 's appt.: 10/12/22 with Neurosurgery; 22 with PMR Dr. Parker Gonsalez   Total Visits: 44/47   Cx/Ns:4/0     Precautions: none at this time      Subjective:  Pt reports from OT and notes she found the R LE reaction time is lacking and requests if PT can help with that. Patient denies any changes since last session. Pain:  [x] Yes  [] No Location: Neck down to (B) Shoulders/UEs to knuckles in hands (B) LE's int feet and toes    Pain Rating: (0-10 scale) 6/10  Pain altered Tx:  [x] No  [] Yes  Action:  Comments:       Objective:     Exercises/Interventions   - Nustep x 5 min Lvl 5 --to reduce stiffness prior to gait training       - Gait training on  treadmill- 3x3 minues standing rest break in between trials.      Time  Speed  Activity/goal Comments    Pre-Trial 3 min 1.3 mph     Trial 1 3 min 1.3 mph 10% step length symmetry  34% success; 9.4% avg asymmetry    Trial 2 3 min  1.3 mph 10% step length symmetry  37% success; 8.6% avg asymmetry    Trial 3 3 min  1.4 mph  10% step length symmetry  25%success; 10.4% avg asymmetry            Standing -- focusing on stance control   Exercise Reps/ Time Weight/ Level Completed  Today Comments   Toe taps  2x10   Unilateral UE support to 2 finger light Ue support    Modified SLS  2x30s   No UE support    3 way(cross over, fwd, lateral) taps to target  2x10 ea   Light unilateral UE support to no UE support on 2nd set    Ball rolls F/B & lateral 20x ea   Bilat 1 finger tip support    Forward lunges into half ball 2x10 ea  x Light UE support;               Specific Instructions for next treatment: TAKE outside if nice out with [facility]-- work on treadmill for high intensity gait training . Focus on gait parameters to get equal step lengths and Wbing; stance control interventions     Assessment:    [x] Progressing toward goals. Began session on NuStep to increase mobility and reduce tightness to allow for better gait training. Completed a pre-trial of 3 min on gait keeper  for baseline prior to 3 trial runs focusing on step length and asymmetry. Patient able to tolerate 1. 3mph while demonstrating improvement in both success and avg asymmetry. Increased speed with last trial to 1.4 mph. Patient initially had difficulty keeping step lengths symmetrical but improved throughout the 3 minutes. Ended session with activity focused on stance control with forward lunges into half ball, with light UE support. Notable improvement with endurance and step symmetry this date compared to last session, as documented above. [] No change. [] Other:      [x] Patient would continue to benefit from skilled physical therapy services in order to: improve strength,mobiltiy and motor control that impacts balance and mobility. GOALS -- updated 8/16/22 & 9/28/22   Goals to Continue for POC:   3. Pt will increase gait speed to >0.8 m/s  With LRAD at mod I level to demonstrate decreased fall risk and optimize mobility to at community level.         - 6/15: limited increase in speed but pt is now mod IND with 2WW        - 7/21: progressing-- significant improvement in gait speed with 2WW to 0.67m/s & 0.46m/s with [facility]         -8/16: progressing with use of SPC -- will assess formally in upcoming sessions.         -9/28: progressing -- 0.5 m/s with [facility] distant supervision   4. Pt will improve score on ESCOBEDO balance scale by >/= 45/56 points  to improve overall balance stability and decrease fall risk with mobility. - 6/15: progressing -- 33/56          - 7/21: progressing -- improves score ot 38/56        -8/16: progressing -- 44/56 greater improvement         -9/28: plateau in progress at 44/56  5. Pt will increase strength of all major muscle groups of the LEs to 4/5 or greater demonstrating improved strength needed to maximize safety and efficiency with mobility tasks such as gait, transfers and stairs. - 6/15: progressing  -7/21; Progressing -- great progress made with LE strength, especially L ankle strength now all being above 3/5  - 8/16: progressing -- almost met but still working on L ankle eversion strength   -9/28: progressing -- will formally assess at later session   6. Pt will be independent with home program addressing strength, flexibility and balance to maximize gains made in therapy to improve overall functional capacity for mobility.     -6/15: progressing -- will continue to update in POC  -7/21: progressing -- will continue to update in POC  -8/16: progressing -- will continue to update in POC, notes good compliance   - 9/28: progressing -- will continue to update in POC, notes good compliance    7. Pt will improve score on OPTIMAL to 38/90 to demonstrate functional improvements with ADLs.                     -9/28: progressing -- 39/90    8. NEW GOAL 7/12: Pt will improve AROM in of cervical ROM to >60 bilaterally to improve ability to visually scan while driving.              -7/21; Progressing               -8/16: progressing -- 52 deg to R this date; 48 deg to L this date               - 9/28: progressing -- reaches to the R, limited on L   9.  New goal 8/16/22: Pt will be able to navigate curbs, uneven surfaces, and inclines/declines with SPC with distant supervision to ensure She can safely navigate the community & get outdoors more. -9/28: progressing -- completes with SBA in sessions   10. New goal 8/16/22: Pt will be able to ambulate >500ft with 6mWT with SPC to demonstrate improved endurance and efficiency needed for community ambulation.               - 9/28: progressing -- reaches 440ft with SPC (178ft improvement)   11. NEW GOAL 9/28/22: Pt will be able to ambulate for 10 minutes straight with fluid gait pattern to demonstrate good endurance needed to optimize community ambulation such as at grocery store or for visiting family. Patient stated Goals: to walk without a walker, improve balance     Pt. Education:  [x] Yes  [] No  [x] Reviewed Prior HEP/Ed  Method of Education: [x] Verbal  [] Demo  [] Written  Comprehension of Education:  [x] Verbalizes understanding. [] Demonstrates understanding. [] Needs review. [] Demonstrates/verbalizes HEP/Ed previously given. Access Code: [email or web address]  URL: [email or web address]  Date: 08/26/2022  Prepared by: Brenda Thomas    Exercises  Long Sitting Ankle Eversion with Resistance - 1 x daily - 7 x weekly - 3 sets - 10 reps  Long Sitting Ankle Plantar Flexion with Resistance - 1 x daily - 7 x weekly - 3 sets - 10 reps  Long Sitting Ankle Inversion with Resistance - 1 x daily - 7 x weekly - 3 sets - 10 reps  Long Sitting Ankle Dorsiflexion with Anchored Resistance - 1 x daily - 7 x weekly - 3 sets - 10 reps      PLAN:    [x] Continue per POC        Treatment Charges: Mins Units   []  Modalities     [x]  Ther Exercise 5 -   []  Manual Therapy     []  Ther Activities     []  Aquatics     []  Neuromuscular     [] Vasocompression     [x] Gait Training 40 3   [] Dry needling        [] 1 or 2 muscles        [] 3 or more muscles     []  Other     Total Treatment time 45 3   * KX modifier needed*       Time In: 1200 Time Out:  6704      Electronically signed by:  Fabricio White, PTA

## 2022-11-16 ENCOUNTER — HOSPITAL ENCOUNTER (OUTPATIENT)
Dept: PHYSICAL THERAPY | Age: 62
Setting detail: THERAPIES SERIES
Discharge: HOME | End: 2022-11-16
Payer: MEDICARE

## 2022-11-16 PROCEDURE — 97116 GAIT TRAINING THERAPY: CPT

## 2022-11-16 NOTE — FLOWSHEET NOTE
[facility]   [address]   Phone: [phone]   Fax: [phone]    Physical Therapy Daily Treatment Note      Date:  2022  Patient Name:  Adolph Clarke    :  1960  MRN: [id number]  Physician: Mark Raza MD                            Insurance: [place] - based on [place] -- [address] [place] [address] Diagnosis:   L69.588S (ICD-10-CM) - Spinal cord injury, cervical region, sequela ([facility]   G82.54 (ICD-10-CM) - Incomplete quadriplegia at C5-6 level ([facility]   G83.4 (ICD-10-CM) - Cauda equina syndrome ([facility]   Rehab Codes: R25 pain , R53.1 weakness, Z74.0 reduced mobility, R29.6 high fall risk, R27.8 lack of coordination, R26.89 gait abnormality   Date of symptom onset: 21 -- cauda equina; 22 Cervical surgeries   Next 's appt.: 10/12/22 with Neurosurgery; 22 with PMR Dr. Stephanie Ruiz   Total Visits: 45/47   Cx/Ns:4/0     Precautions: none at this time      Subjective:    Pain:  [x] Yes  [] No Location: Neck down to (B) Shoulders/UEs to knuckles in hands (B) LE's int feet and toes    Pain Rating: (0-10 scale) 6/10  Pain altered Tx:  [x] No  [] Yes  Action:  Comments: Patient arrives with RW and denies any changes since last session. Objective:     Exercises/Interventions     - Gait training on  treadmill-   standing rest break in between trials.      Time  Speed  Activity/goal Comments    Trial 1 3 min 1.3 mph 10% step length symmetry  28% success; 10.6% avg asymmetry    Trial 2 3 min  1.3 mph 10% step length symmetry  31% success; 10.1% avg asymmetry    Trial 3 3 min  1.3 mph  10% step length symmetry  34% success; 9.5% avg asymmetry    Trial 4 3 min 1.4 mph 1-% step length symmetry 24% success; 10.4% avg asymmetry            Standing -- focusing on stance control   Exercise Reps/ Time Weight/ Level Completed  Today Comments   Toe taps from foam 2x10  x Unilateral UE support to 2 finger light Ue support    Modified SLS 2x30s   No UE support    3 way(cross over, fwd, lateral) taps to target  2x10 ea  x Light unilateral UE support to no UE support on 2nd set    Ball rolls F/B & lateral 20x ea   Bilat 1 finger tip support    Forward lunges into half ball 2x10 ea   Light UE support;               Specific Instructions for next treatment: TAKE outside if nice out with [facility]-- work on treadmill for high intensity gait training . Focus on gait parameters to get equal step lengths and Wbing; stance control interventions     Assessment:    [x] Progressing toward goals. 11/16: Continued focus on improving step length and endurance with high intensity gait training on the treadmill. Patient able to improve with each trial but fatigued by trial 4 with the higher speed. With higher speed, patient began having difficulty with L swing phase resulting in premature heel strike. Patient able to improve with verbal cues. Continued with standing exercises to improve stance phase with dynamic activity with addition of toe taps from foam.     [] No change. [] Other:      [x] Patient would continue to benefit from skilled physical therapy services in order to: improve strength,mobiltiy and motor control that impacts balance and mobility. GOALS -- updated 8/16/22 & 9/28/22   Goals to Continue for POC:   3. Pt will increase gait speed to >0.8 m/s  With LRAD at mod I level to demonstrate decreased fall risk and optimize mobility to at community level. - 6/15: limited increase in speed but pt is now mod IND with 2WW        - 7/21: progressing-- significant improvement in gait speed with 2WW to 0.67m/s & 0.46m/s with [facility]         -8/16: progressing with use of SPC -- will assess formally in upcoming sessions.         -9/28: progressing -- 0.5 m/s with [facility] distant supervision   4. Pt will improve score on ESCOBEDO balance scale by >/= 45/56 points  to improve overall balance stability and decrease fall risk with mobility.         - 6/15: progressing -- 33/56          - 7/21: progressing -- improves score ot 38/56        -8/16: progressing -- 44/56 greater improvement         -9/28: plateau in progress at 44/56  5. Pt will increase strength of all major muscle groups of the LEs to 4/5 or greater demonstrating improved strength needed to maximize safety and efficiency with mobility tasks such as gait, transfers and stairs. - 6/15: progressing  -7/21; Progressing -- great progress made with LE strength, especially L ankle strength now all being above 3/5  - 8/16: progressing -- almost met but still working on L ankle eversion strength   -9/28: progressing -- will formally assess at later session   6. Pt will be independent with home program addressing strength, flexibility and balance to maximize gains made in therapy to improve overall functional capacity for mobility.     -6/15: progressing -- will continue to update in POC  -7/21: progressing -- will continue to update in POC  -8/16: progressing -- will continue to update in POC, notes good compliance   - 9/28: progressing -- will continue to update in POC, notes good compliance    7. Pt will improve score on OPTIMAL to 38/90 to demonstrate functional improvements with ADLs.                     -9/28: progressing -- 39/90    8. NEW GOAL 7/12: Pt will improve AROM in of cervical ROM to >60 bilaterally to improve ability to visually scan while driving.              -7/21; Progressing               -8/16: progressing -- 52 deg to R this date; 48 deg to L this date               - 9/28: progressing -- reaches to the R, limited on L   9. New goal 8/16/22: Pt will be able to navigate curbs, uneven surfaces, and inclines/declines with [facility] with distant supervision to ensure She can safely navigate the community & get outdoors more. -9/28: progressing -- completes with SBA in sessions   10.  New goal 8/16/22: Pt will be able to ambulate >500ft with 6mWT with SPC to demonstrate improved endurance and efficiency needed for community ambulation.               - 9/28: progressing -- reaches 440ft with SPC (178ft improvement)   11. NEW GOAL 9/28/22: Pt will be able to ambulate for 10 minutes straight with fluid gait pattern to demonstrate good endurance needed to optimize community ambulation such as at grocery store or for visiting family. Patient stated Goals: to walk without a walker, improve balance     Pt. Education:  [x] Yes  [] No  [x] Reviewed Prior HEP/Ed  Method of Education: [x] Verbal  [] Demo  [] Written  Comprehension of Education:  [x] Verbalizes understanding. [] Demonstrates understanding. [] Needs review. [] Demonstrates/verbalizes HEP/Ed previously given. Access Code: [email or web address]  URL: [email or web address]  Date: 08/26/2022  Prepared by: Cam Zimmerman    Exercises  Long Sitting Ankle Eversion with Resistance - 1 x daily - 7 x weekly - 3 sets - 10 reps  Long Sitting Ankle Plantar Flexion with Resistance - 1 x daily - 7 x weekly - 3 sets - 10 reps  Long Sitting Ankle Inversion with Resistance - 1 x daily - 7 x weekly - 3 sets - 10 reps  Long Sitting Ankle Dorsiflexion with Anchored Resistance - 1 x daily - 7 x weekly - 3 sets - 10 reps      PLAN:    [x] Continue per POC        Treatment Charges: Mins Units   []  Modalities     [x]  Ther Exercise 5 -   []  Manual Therapy     []  Ther Activities     []  Aquatics     []  Neuromuscular     [] Vasocompression     [x] Gait Training 40 3   [] Dry needling        [] 1 or 2 muscles        [] 3 or more muscles     []  Other     Total Treatment time 45 3   * KX modifier needed*       Time In: 1:45pm Time Out:  2:30pm      Electronically signed by:  Cam Zimmerman PTA

## 2022-11-18 RX ORDER — PANTOPRAZOLE SODIUM 40 MG/1
TABLET, DELAYED RELEASE ORAL
Qty: 90 TABLET | Refills: 1 | Status: SHIPPED | OUTPATIENT
Start: 2022-11-18

## 2022-11-21 ENCOUNTER — HOSPITAL ENCOUNTER (OUTPATIENT)
Dept: PHYSICAL THERAPY | Age: 62
Setting detail: THERAPIES SERIES
Discharge: HOME | End: 2022-11-21
Payer: MEDICARE

## 2022-11-21 NOTE — CARE COORDINATION
[] [address]  [place]: [phone]   F: [phone]     Physical Therapy Cancel/No Show note    Date: 2022  Patient: Miriam Anderson  : 1960  MRN: [id number]    Visit Count: 45/47  Cancels/No Shows to date:     For today's appointment patient:    [x]  Cancelled    [] Rescheduled appointment    [] No-show     Reason given by patient:    [x]  Patient ill    []  Conflicting appointment    [] No transportation      [] Conflict with work    [] No reason given    [] Weather related    [] [id number]    [] Other:      Comments:        [x] Next appointment was confirmed    Electronically signed by: Luciano Mayes PT

## 2022-11-23 ENCOUNTER — OFFICE VISIT (OUTPATIENT)
Dept: PHYSICAL MEDICINE AND REHAB | Age: 62
End: 2022-11-23
Payer: MEDICARE

## 2022-11-23 VITALS
BODY MASS INDEX: 26.85 KG/M2 | HEIGHT: 61 IN | DIASTOLIC BLOOD PRESSURE: 73 MMHG | TEMPERATURE: 97.9 F | SYSTOLIC BLOOD PRESSURE: 122 MMHG | HEART RATE: 105 BPM | WEIGHT: 142.2 LBS

## 2022-11-23 DIAGNOSIS — M79.10 MYALGIA: ICD-10-CM

## 2022-11-23 DIAGNOSIS — R25.2 SPASTICITY: ICD-10-CM

## 2022-11-23 DIAGNOSIS — S14.109S SPINAL CORD INJURY, CERVICAL REGION, SEQUELA (HCC): Primary | ICD-10-CM

## 2022-11-23 PROCEDURE — 99213 OFFICE O/P EST LOW 20 MIN: CPT | Performed by: PHYSICAL MEDICINE & REHABILITATION

## 2022-11-23 PROCEDURE — 3078F DIAST BP <80 MM HG: CPT | Performed by: PHYSICAL MEDICINE & REHABILITATION

## 2022-11-23 PROCEDURE — G8419 CALC BMI OUT NRM PARAM NOF/U: HCPCS | Performed by: PHYSICAL MEDICINE & REHABILITATION

## 2022-11-23 PROCEDURE — 1036F TOBACCO NON-USER: CPT | Performed by: PHYSICAL MEDICINE & REHABILITATION

## 2022-11-23 PROCEDURE — 3074F SYST BP LT 130 MM HG: CPT | Performed by: PHYSICAL MEDICINE & REHABILITATION

## 2022-11-23 PROCEDURE — 3017F COLORECTAL CA SCREEN DOC REV: CPT | Performed by: PHYSICAL MEDICINE & REHABILITATION

## 2022-11-23 PROCEDURE — 20553 NJX 1/MLT TRIGGER POINTS 3/>: CPT | Performed by: PHYSICAL MEDICINE & REHABILITATION

## 2022-11-23 PROCEDURE — G8482 FLU IMMUNIZE ORDER/ADMIN: HCPCS | Performed by: PHYSICAL MEDICINE & REHABILITATION

## 2022-11-23 PROCEDURE — G8427 DOCREV CUR MEDS BY ELIG CLIN: HCPCS | Performed by: PHYSICAL MEDICINE & REHABILITATION

## 2022-11-23 RX ORDER — LIDOCAINE HYDROCHLORIDE 10 MG/ML
6 INJECTION, SOLUTION INFILTRATION; PERINEURAL ONCE
Status: COMPLETED | OUTPATIENT
Start: 2022-11-23 | End: 2022-11-23

## 2022-11-23 RX ORDER — BACLOFEN 10 MG/1
10 TABLET ORAL
Qty: 150 TABLET | Refills: 2 | Status: SHIPPED | OUTPATIENT
Start: 2022-11-23

## 2022-11-23 RX ADMIN — LIDOCAINE HYDROCHLORIDE 6 ML: 10 INJECTION, SOLUTION INFILTRATION; PERINEURAL at 15:22

## 2022-11-23 NOTE — PROGRESS NOTES
[facility] PHYSICAL MEDICINE & REHABILITATION  [address] [place] [address]  [place] [address]  Dept: [phone]  Dept Fax: [phone]    Outpatient Followup Note    Toby Acharya, 65 y.o., female, presents for follow up c/o of Neck Pain (Cervical myelopathy)  . HPI:     HPI  Patient with spinal cord injury related to critical cervical stenosis who is being seen today for persistent posterior aching neck pain. This is associated with daily headache that is also posterior. She feels the frequency and severity of the headache is worsening. She has no associated symptoms. She rates the neck and headache pain 7-8/10 on the pain scale. She has some mild temporary relief from Aleve prn. She continues to treat with [facility]. They are managing her pain medications. She has advanced with therapy and is using a cane or no device at home with the rolling walker used for community ambulation and activity. She does note some worsening muscle tightness with her L plantar flexors/inverters. She has AFO but is walking well without them and hasn't been using them regularly.      Past Medical History:   Diagnosis Date    Anxiety     Arthritis     At maximum risk for fall 12/29/2021    caudal equina syndrome and cervical stenosis    Cancer (HCC)     right breast    Cauda equina syndrome ([facility] [address] 12/29/2021    neuropathy, mobility difficulty, incontinance    Cervical stenosis of spine 12/29/2021    GERD (gastroesophageal reflux disease)     History of stomach ulcers     Hypertension     Dr. Maggie Hillman    Hypothyroidism     Lumbar neuritis     Post laminectomy syndrome     Spinal deformity 11/2021    cervical and lumbar    Thyroid disease     Uses wheelchair     Wears dentures     Wellness examination     Dr. Maggie Hillman      Past Surgical History:   Procedure Laterality Date    BACK SURGERY      lower back x 3, cervical- x 1: C4- C6, 11/4/2014     BREAST SURGERY Right     mastectomy with reconstruction    CERVICAL FUSION N/A 2022    C5 CORPECTOMY, USE OF INTRAOPERATIVE CERVICAL TRACTION performed by Cecily Méndez DO at [facility] N/A 2022    POSTERIOR FIXATION C2-C7 performed by Cecily Méndez DO at [address]  about 2018    HERNIA REPAIR Bilateral     inguinal    HYSTERECTOMY, TOTAL ABDOMINAL (CERVIX REMOVED)      LUMBAR SPINE SURGERY N/A 2021    LUMBAR LAMINECTOMY L2-S1 performed by Cecily Méndez DO at [address] [facility], RADICAL      OTHER SURGICAL HISTORY  2022    C5 CORPECTOMY, USE OF INTRAOPERATIVE CERVICAL TRACTION (N/A Spine Cervical)      Family History   Problem Relation Age of Onset    Hypertension Mother     Heart Disease Mother     Cancer Mother      Social History     Socioeconomic History    Marital status: Single   Tobacco Use    Smoking status: Former     Packs/day: 0.50     Years: 30.00     Pack years: 15.00     Types: Cigarettes     Quit date:      Years since quittin.8    Smokeless tobacco: Never   Vaping Use    Vaping Use: Every day    Substances: Nicotine   Substance and Sexual Activity    Alcohol use: Yes     Alcohol/week: 0.0 standard drinks     Comment: weekend at times    Drug use: Yes     Types: Prescription     Comment: Methadone from [facility]     Social Determinants of Health     Financial Resource Strain: Low Risk     Difficulty of Paying Living Expenses: Not very hard   Food Insecurity: No Food Insecurity    Worried About Running Out of Food in the Last Year: Never true    Ran Out of Food in the Last Year: Never true   Transportation Needs: No Transportation Needs    Lack of Transportation (Medical): No    Lack of Transportation (Non-Medical): No   Physical Activity: Inactive    Days of Exercise per Week: 0 days    Minutes of Exercise per Session: 0 min   Stress: No Stress Concern Present    Feeling of Stress :  Only a little   Social Connections: Socially Isolated Frequency of Communication with Friends and Family: Three times a week    Frequency of Social Gatherings with Friends and Family: Twice a week    Attends Mosque Services: Never    Active Member of Clubs or Organizations: No    Attends Club or Organization Meetings: Never    Marital Status:    Housing Stability: [address] to Pay for Housing in the Last Year: No    Number of [place] in the Last Year: 1    Unstable Housing in the Last Year: No       Current Outpatient Medications   Medication Sig Dispense Refill    baclofen (LIORESAL) 10 MG tablet Take 1 tablet by mouth 5 times daily 1 PO TID with 2 tabs PO at HS = 5 tabs daily 150 tablet 2    pantoprazole (PROTONIX) 40 MG tablet TAKE ONE TABLET BY MOUTH DAILY 90 tablet 1    dilTIAZem (CARDIZEM CD) 180 MG extended release capsule Take 1 capsule by mouth daily 30 capsule 3    busPIRone (BUSPAR) 30 MG tablet TAKE ONE TABLET BY MOUTH TWICE A DAY 60 tablet 3    aspirin EC 81 MG EC tablet Take 1 tablet by mouth daily 90 tablet 1    levothyroxine (SYNTHROID) 100 MCG tablet Take 1 tablet by mouth Daily 30 tablet 3    lisinopril (PRINIVIL;ZESTRIL) 5 MG tablet Take 1 tablet by mouth daily 30 tablet 3    Handicap Placard MISC by Does not apply route Cannot walk 200 feet due to medical issues  Expires 7/14/2027 1 each 0    HYDROcodone-acetaminophen (NORCO)  MG per tablet Take 1 tablet by mouth every 6 hours as needed for Pain. methadone (DOLOPHINE) 10 MG tablet Take 10 mg by mouth 2 times daily. No current facility-administered medications for this visit. Allergies   Allergen Reactions    Latex Hives, Itching, Dermatitis and Rash    Kiwi Extract      Face swelling, some difficulty breathing       Subjective:      Review of Systems  Constitutional: Negative for fever, chills and unexpected weight change. HENT: Negative for trouble swallowing. Respiratory: Negative for cough and shortness of breath.     Cardiovascular: Negative for chest pain. Endocrine: Negative for polyuria. Genitourinary: Negative for dysuria, urgency, frequency, incontinence and difficulty urinating. Gastrointestinal: Negative for constipation or diarrhea. Musculoskeletal: Positive for arthralgias. Neurological: Positive for headaches, numbness, or tingling. Psychiatric: Negative for depressed mood or anxiety. Objective:     Physical Exam  /73   Pulse (!) 105   Temp 97.9 °F (36.6 °C)   Ht 5' 1\" (1.549 m)   Wt 142 lb 3.2 oz (64.5 kg)   BMI 26.87 kg/m²   Constitutional: She appears well-developed and well-nourished. In no distress. HEENT: NCAT, PERRL, EOMI. Mucous membranes pink and moist.  Pulmonary/Chest: Respirations WNL and unlabored. MSK: Functional ROM impaired cervical spine. [place] neck deformity of cervical spine. Palpable deformity under cervical spine healed incision. Palpable muscle tightness/trigger points B cervical paraspinal muscles and B upper trapezius muscles, worse on the L. Strength 4+/5 key muscles BUEs. Neurological: She is alert and oriented to person, place, and time. Spasticity L plantar flexors inverters 2 on MAS. Skin: Skin is warm dry and intact with good turgor. Psychiatric: She has a normal mood and affect. Her behavior is normal. Thought content normal.   Medical assistant note and vitals for today's encounter reviewed.     Diagnostic Studies:      Latest Reference Range & Units 9/18/22 12:15   Sodium 135 - 144 mmol/L 129 (L)   Potassium 3.7 - 5.3 mmol/L 4.3   Chloride 98 - 107 mmol/L 94 (L)   CO2 20 - 31 mmol/L 25   BUN,BUNPL 8 - 23 mg/dL 9   Creatinine 0.50 - 0.90 mg/dL 0.72   Anion Gap 9 - 17 mmol/L 10   GFR Non-African American >60 mL/min >60   GFR African American >60 mL/min >60   Glucose, Random 70 - 99 mg/dL 105 (H)   CALCIUM, SERUM, [id number] 8.6 - 10.4 mg/dL 9.1   Total Protein 6.4 - 8.3 g/dL 7.7   (L): Data is abnormally low  (H): Data is abnormally high     Latest Reference Range & Units 9/18/22 12:15 Albumin 3.5 - 5.2 g/dL 4.5   Alk Phos 35 - 104 U/L 75   ALT 5 - 33 U/L 27   AST <32 U/L 34 (H)   Bilirubin 0.3 - 1.2 mg/dL 0.3   Total Protein 6.4 - 8.3 g/dL 7.7   (H): Data is abnormally high    PROCEDURE:  Trigger Point Procedural Note    Indication: Severe pain and pain control    Procedure: 12 Trigger Points were identified in the B cervical paraspinal muscles and B upper trapezius muscles. The patient was placed in the appropriate position and the area over the trigger point was prepped with iodine and alcohol. Injection was performed into the trigger point area using 0.5 ml Lidocaine 1% into each of the 12 trigger point(s) followed by dry needling. The injection site was covered with a bandage. Complications: None, patient tolerated procedure well. Assessment:       Diagnosis Orders   1. Spinal cord injury, cervical region, sequela ([address]        2. Myalgia        3. Spasticity             Plan:      Trigger point injections as above. Can repeat in 6 weeks if indicated. Encouraged home stretching, heat, massage with a device such as theracane. Discussed pathophysiology and management of spasticity with her today. Advised her I would prefer to manage her muscle relaxer for spasticity indication. We will consider Botox to address her spasticity in the L gastroc/soleus/tibialis posterior if no improvement with increased dose of baclofen. Increased dose of baclofen at hs to address posterior muscle neck pain and spasticity L calf muscles. No orders of the defined types were placed in this encounter. Orders Placed This Encounter   Medications    baclofen (LIORESAL) 10 MG tablet     Sig: Take 1 tablet by mouth 5 times daily 1 PO TID with 2 tabs PO at HS = 5 tabs daily     Dispense:  150 tablet     Refill:  2    lidocaine 1 % injection 6 mL     Return in about 6 weeks (around 1/4/2023).        Electronically signedby Herminia Conley MD on 11/23/2022 at 3:44 PM.     Please note that this chartwas generated using voice recognition Dragon dictation software. Although everyeffort was made to ensure the accuracy of this automated transcription, some errorsin transcription may have occurred.

## 2022-11-29 ENCOUNTER — HOSPITAL ENCOUNTER (OUTPATIENT)
Dept: OCCUPATIONAL THERAPY | Age: 62
Setting detail: THERAPIES SERIES
Discharge: HOME | End: 2022-11-29
Payer: MEDICARE

## 2022-11-29 PROCEDURE — 97166 OT EVAL MOD COMPLEX 45 MIN: CPT

## 2022-11-29 PROCEDURE — 97535 SELF CARE MNGMENT TRAINING: CPT

## 2022-11-29 NOTE — CONSULTS
TREATMENT LOCATION:   [] [address]   [address]: [phone]  F: [phone] [x] [address]: [phone]  F: [phone]      Lymphedema Services - Initial Evaluation for Upper Extremity    Date:  2022  Patient: Abebe Anne  : 1960             MRN: [id number]  Referring Provider: Vero Vick DO       Phone: [phone]  Fax: [phone]  Insurance: Medicare ([address] [place] [address] ) - visits BMN  Medical Diagnosis: I89.0  Rehab Codes: I89.0, I92.2  - hx R mastectomy  Onset Date: 10/12/22  Visit# / total visits:  scheduled; Progress note due at visit 10 per POC. ( Certification Dates: 2022 - 3/1/22)    Allergies:  [] NKA      [x] Latex       [] Adhesive tape       [] Medications    [x] Other: kiwi extract  Medications: See charted information in Epic    Past Medical History: See charted information in Epic     Restrictions: No blood pressure/blood draw in: RUE  Fall Risk:   [] No    [x] Yes   If yes, intervention: pt uses assistive device      Overview: Patient is a 65year old female referred to the Lymphedema Clinic with a diagnosis of right Upper Extremity Lymphedema. Pt has hx of R breast CA with R mastectomy/ALND and radiation. Relevant provider notes from vascular 10/24/22: Abebe Anne is a 65 y.o. female who comes to the office for the first time regarding right upper extremity lymphedema. She had mastectomy approximately 30 years ago with radiation and chemotherapy. She has had no evidence of recurrence. Several months ago she started having swelling of the right upper extremity which has persisted and has worsened. She had a duplex examination of the right upper extremity revealing phasic signals throughout and no evidence of noncompressible veins. At this time I think the best option is the lymphedema clinic and compression garment.   A compression garment has already been ordered as has referral to the lymphedema clinic. I will rely on them to work on her to get her back down the size and the compression garment will be a lifelong medical supply for her. She understands and agrees and we can see her back as needed. SUBJECTIVE:  Pt reports that TAMMY has been swollen for about 3 months. Pt originally thought that she overworked her RUE in PT when she was learning how to use a cane. Currently working with PT and  rehab OT. Hx of Complete Decongestive Therapy:[]Yes - Date:        [x]No   Rate of onset:   [x] Slow   [] Rapid     [] Acute   Progression: [] Improving   []  Worsening  [x] Consistent   Conservative Treatments Utilized in the Past:  [x] None  [] Compression Garments   [] Bandaging  [] Elevation   [] Exercise  []Self MLD  [] Other/comments:      Current Lymphedmea Treatments:   [] None  [x] Compression Garments   [] Bandaging  [x] Elevation  [] Exercise   []Self MLD  [] Other/comments:          Aggravating factors:  [] None   [x] Activity  [] Stress  [] Sleep Position [] Travel [] Hot Temperatures [] Diet   []  Other/comments:    Relieving factors:   [] None [x] Elevation  [] Activity  [x] Compression  [x] Rest  [] Cold Temperatures [] Diet   []  Other/comments:    Comments: Pt has sleeve from [employer]       Pain:  [] YES    [x] NO   Location: n/a     Pain Rating: ( 0-10 scale) : 0/10  Comments:     History of Cancer: [x]Yes []No   Location/Type:  R breast CA   31 years ago  Eval completed after cancer treatments completed. Surgery: R mastectomy with node dissection to R axilla and to \"collar bone;\" failed reconstruction   Node Dissection: Pt unsure how many nodes taken    Chemotherapy: yes   Radiation: yes   Hormone Therapy: n/a   Cancer Related Fatigue:  n/a - pt has been cancer free for nearly 30 years   Is dominant extremity affected? [x]Yes  []No  [] N/A   SOZO bioimpedance measure?  []Yes  [x]No      Comorbidities:   [] Obesity [] Dialysis  [x] Other: spinal cord injury 2/2 cauda equina syndrome & cervical stenosis   [] Asthma/COPD [] Dementia [x] Other: hysterectomy 20+ years ago   [] Stroke [] Sleep apnea [] Other:   [] Vascular disease [] Rheumatic disease [] Other:     Absolute Lymphedema Contraindications - treatement [] NONE     [] CHF (only if patient is un-medicated or edema is solely d/t cardiac failure)    [] DVT- Acute, No MLD to limb       [] Advanced Renal Disease - Need Physician Clearance   [] Acute Skin Infection ( e.g. Cellulitis, Ersipelas)   [x] Thyroid condition (caution with MLD to neck) - on medication   [] Cardiac Arrhythmia   [] Hypersensitive Carotid Sinus    Absolute Contraindications Regarding the Deep Abdomen: [x] NONE    [] Pregnancy    [] Abdominal Aortic Aneurism - Current or history of    [] Inflammatory Disease of bowel or intestines   [] Severe Arteriosclerosis    [] Intra-abdominal scar formations following surgery   [] Recent abdominal surgery   [] Pelvic DVT- Current or history of   [] Presence of clot prevention devices ( e.g. - [place] Filter)     Relative Lymphedema Contraindications [] NONE      [] Malignant Lymphedema - Edema is being caused by active cancer. MLD and CDT considered palliative during active cancer treatments. Physician approval required. [x] Age 61+    [] Limb paralysis     Home/Work Environment  Patient lives with: Spouse, works   In what type of home []  One story   [x] Two story  - stays downstairs  [] Split level  [] Apartment   Number of stairs to enter 1   With handrail on the []  Right to enter   [] Left to enter   Bathroom has a []  Tub only  [x] Tub/shower combo   [] Walk in shower    [x]  Grab bars   Washing machine is on [x]  Main level   [] Second level   [] Basement   Employer    Job Status []  Normal duty   [] Light duty   [] Off due to condition    []  Retired   [] Not employed   [x] Disability  [] Other:  []  Return to work:    Work activities/duties        ADL/IADL [x] Previously independent with all [] Currently independent with all Who currently assists the patient with task     [] Previously independent with all except: [x] Currently independent with all except:     Bathing  [] Assist [] Assist     Dress/grooming [] Assist [] Assist     Transfer/mobility [] Assist [] Assist     Feeding [] Assist [] Assist     Toileting [] Assist [] Assist     Driving [] Assist [x] Assist  family    Housekeeping [] Assist [] Assist     Grocery shop/meal prep [] Assist [x] Assist  family       Gait Prior level of function Current level of function    [x] Independent  [] Assist [x] Independent  [] Assist   Device: [] Independent [] Independent    [] Straight Cane [] Quad cane [x] Straight Cane [] Quad cane    [] Standard walker [] Rolling walker   [] 4 wheeled walker [] Standard walker [x] Rolling walker (in community)   [] 4 wheeled walker    [] Wheelchair [] Wheelchair         OBJECTIVE  Physical Status:   Range of motion: Deficits [] Yes [x] No       Comment:  Strength:         Deficits [] Yes [x] No        Comment:    Presentation of Affected Areas  Location: right Upper Extremity    Description: Hyperpigmentation  Pitting 2+  Stemmer Sign negative  Firm/Fibrotic     Scars mastectomy   Wounds None   Sensation Normal   Additional Comments:        Circumferential Measurements  Measurements taken from nail base of D3 digit. Fingers are measured at the base.    Measurements (cm) Right Left   Dorsum 11 18.3 18.0   Wrist 17 17.2 15.1   Lower Forearm 25 20.8 16.6   Upper Forearm 33 27.0 21.0   Olecranon 40 26.5 20.8   Lower Bicep 45 28.4 20.2   Upper Bicep 55 30.9 25.8   Initial Total 11/29/2022:   169.1 137.5                ASSESSMENT: Patient would benefit from skilled occupational therapy services in order to:  Decrease edema that has accumulated in the extremity, decrease risk of infection, increase ease and independence with ADL, educate on long term management of condition, and improve overall quality of life. Pt is provided with a folder which included educational hand out on the basics of lymphedema, program details and what to expect during treatment for further review at home. Writer educates on an impaired lymphatic system vs. a healthy lymphatic system and how it relates to swelling and skin integrity based on CA tx hx. Pt is edu on a POC that would be of benefit to them given findings made during evaluation, including the items checked below. Treatment may include the following:     [x]Bandaging   [x]Self-Bandaging/Caregiver Training   [x]MLD    [x]Self-MLD   [x] Therapeutic Exercise    [x] Education/Instruction in home management program  [x] Education and trial of a Vasopneumatic Pump    [x] Education and transition to long term management devices such as velcro compression garments and/or daytime compression sleeve/stocking(s)   [x]Discharge to Home Program     Response to treatment: Pt verbalizes and is agreeable to the instructions/ POC established at today's evaluation. PLAN FOR NEXT VISIT: initiate bandaging, caregiver training if able      Lymphedema Stage per ISL Guidelines    [] 0: Subclinical with no evidence on physical exam  [] 1:  Early onset, swelling/heaviness, pitting edema, subsides with limb elevation  [x] 2:  More advanced, fibrosis resulting in non-pitting edema, does not respond to elevation, thickening of the tissues  [] 3: Elephantiasis, pitting absent, huge limbs with dry/scaly, papillomatosis, hyperkeratosis, fluid may be leaking with recurrent cellulitis. Acanthosis (deep body folds). Elevation &   diuretics ineffective. Therapy Goals  STG - To be addressed within 5 visits    Pt will demonstrate compliance of maintaining lymphedema precautions to reduce the risks of infection and further exacerbations. Pt will demonstrate independence with decongestive exercise program in order to expedite fluid rerouting.       LTG To be adressed within 10 visits     Pt will demonstrate competence with SELF-MLD ( manual lymphatic drainage) in order to reroute lymphatic pathways for decreased swelling. Pt/Caregiver will demonstrate independence with donning/doffing and wearing schedule for compression garments/ devices to maintain decreased size upon discharge. Pt to compliant with CDT in order to reduce edema in the R UE by 20+ cm total.         Patient's Goal: reduce swelling     Response to Education Provided:  [x] Verbalized understanding   [] Demonstrates/verbalizes HEP/Education previously given      [x] Needs continued review            [] No understanding   Learner(s): [x] Patient  [] Spouse [] Family  [] Other:   Method(s): [x] Verbal [] Demonstration [x] Handouts [] Exercise booklet   Family available to assist with home program if needed: [x] Yes [] No    FREQUENCY: Pt will be seen 2 x/ week for 10 visits and will follow up as appropriate. Focus is to remain on short and long term goals listed above.      Rehabilitation Potential/Commitment: [] Good [] Fair     [] Poor    Functional Outcome Measure(s):  Lymphedema Life Impact Scale (LLIS) Score: 13% functionally impaired      Clearance needed:  []Yes    [x]No     Physicians/specialties giving clearance:    Referral needed to: [] Physical Therapy   [] Speech Therapy                 Treatment Charges   Minutes   Units   Evaluation                                                             $97.64/ $76.35            Low   (80213)            Moderate   (56655) 20 1          High   (36521)     Manual Therapy (82997):                                      $26.27 / $20.83     Therapeutic activities (88078):                             $35.63/ $25.68     Therapeutic Exercise (72851)                              $28.50/$ 22.29     Self care/home mgmt (47439)                              $31.61/ $23.84 30 2   Vasopneumatic Device (40687)                           $8.99     Total Treatment Time    50 3     Medicare Tracking - $2159 cap Totals   Today 145.32   Previous     Grand Total 145.32       Time In:  1100  Time Out: 1150      Electronically signed by Marvin Martinez OT on 11/29/2022 at 11:07 AM      Physician Signature: _________________________        Date: _______________  By signing above or cosigning this note, I have reviewed this plan of care and certify a need to continue medically necessary rehabilitation services.       *PLEASE SIGN ABOVE AND FAX BACK .[phone] WITH ALL PAGES FOR CONSENT TO CONTINUE LYMPHEDEMA TREATMENT*

## 2022-11-30 ENCOUNTER — HOSPITAL ENCOUNTER (OUTPATIENT)
Dept: PHYSICAL THERAPY | Age: 62
Setting detail: THERAPIES SERIES
Discharge: HOME | End: 2022-11-30
Payer: MEDICARE

## 2022-11-30 NOTE — CARE COORDINATION
Recommend OBSERVATION and continued MONITORING for progression. [] [address]  [place]: [phone]   F: [phone]     Physical Therapy Cancel/No Show note    Date: 2022  Patient: Komal Reid  : 1960  MRN: [id number]    Visit Count: 45/47  Cancels/No Shows to date:     For today's appointment patient:    [x]  Cancelled    [] Rescheduled appointment    [] No-show     Reason given by patient:    [x]  Patient ill -- pt is current Covid+    []  Conflicting appointment    [] No transportation      [] Conflict with work    [] No reason given    [] Weather related    [] COVID-19    [] Other:      Comments:  pt to call back when feeling better and will need to be scheduled for 1x progress note       [] Next appointment was confirmed    Electronically signed by: Delmy Veliz, PT

## 2022-12-08 NOTE — TELEPHONE ENCOUNTER
[facility]  Discharge Note  Short Stay    Procedure(s) (LRB):  Ablation (Bilateral)      OUTCOME: Patient tolerated treatment/procedure well without complication and is now ready for discharge.    DISPOSITION: Home or Self Care    FINAL DIAGNOSIS:  SVT s/p ablation    FOLLOWUP: In clinic Scheduled for 1/30 in [place].    DISCHARGE INSTRUCTIONS:  reviewed with family    TIME SPENT ON DISCHARGE: 15 minutes   Orders were put in June 30th: see lab orders

## 2022-12-23 RX ORDER — LISINOPRIL 5 MG/1
TABLET ORAL
Qty: 30 TABLET | Refills: 3 | Status: SHIPPED | OUTPATIENT
Start: 2022-12-23

## 2022-12-23 RX ORDER — LEVOTHYROXINE SODIUM 0.1 MG/1
TABLET ORAL
Qty: 30 TABLET | Refills: 3 | Status: SHIPPED | OUTPATIENT
Start: 2022-12-23

## 2023-01-10 ENCOUNTER — HOSPITAL ENCOUNTER (OUTPATIENT)
Dept: OCCUPATIONAL THERAPY | Age: 63
Setting detail: THERAPIES SERIES
Discharge: HOME | End: 2023-01-10
Payer: MEDICARE

## 2023-01-10 PROCEDURE — 97535 SELF CARE MNGMENT TRAINING: CPT

## 2023-01-10 NOTE — FLOWSHEET NOTE
TREATMENT LOCATION:   [] [address]   [facility]. [address]: [phone]  F: [phone] [x] [address]: [phone]  F: [phone]        Lymphedema Services - Treatment Note of the Upper Extremity    Date:  1/10/2023  Patient: Ren Martinez  : 1960             MRN: [id number]  Referring Provider: Orion Ovalles DO       Phone: [phone]  Fax: [phone]  Insurance: Medicare ([address] [place] [address] ) - visits BMN  Medical Diagnosis: I89.0  Rehab Codes: I89.0, I92.2     - hx R mastectomy  Onset Date: 10/12/22  Visit# / total visits: 2/ scheduled; Progress note due at visit 10 per POC. ( Certification Dates: 2022 - 3/1/22)       Contraindications/Precautions:   [x] Thyroid condition (caution with MLD to neck) - on medication  [x] Age 60+     Subjective: Pt reports no changes since last visit. Pain: [x] YES    [] NO     Location: generalized      Pain Rating: ( 0-10 scale) : 5/10  Comments:     Plan for next session:  follow up caregiver training         Objective:   [] Measurement [] Bandaging [] MLD [] Skin care   [] Education [] Self-bandaging [] Self-MLD [] Wound Care   [] Exercise [] Caregiver training [] Kinesiotaping [] Other:    [] Nutrition [] Garment fitting/training [] Vasopneumatic Pump       1/10/2023 observations: arrives with spouse, wearing compression sleeve    Circumferential Measurements   Measurements taken from nail base of D3 digit. Fingers are measured at the base. Measurements (cm) Right Left   Dorsum 11 18.2 18.0   Wrist 17 18.2 15.1   Lower Forearm 25 21.4 16.6   Upper Forearm 33 27.3 21.0   Olecranon 40 28.5 20.8   Lower Bicep 45 29.0 20.2   Upper Bicep 55 31.5 25.8   Current: 1/10/23    Initial Total 2022:   174.1    169.1 xxx    137.5              Assessment:     Basic edu on compression sleeves for long term management provided.  Edu on basic self-MLD strokes for bilateral axilla with good teachback from patient following demo and tactile cues. Will review and progress at future visit. Edu today to patient and spouse on bandaging technique, theory, and precautions utilizing handouts below. Technique is demonstrated to caregiver utilizing exact technique below on patient's BLE's. Edu on basic UE exercsies to complete, such as bicep curls and wrist flexion (unweighted). Will review/progress at future visit. Home Compression Protocol During Treatment    Leave bandages on 24/7, but no more than 3-4 days at a time. If bandages are left on too long or are sliding down they are not doing their job and you are at risk for skin irritation. When bandages are removed, feel free to wash up and shower. If someone is trained on bandaging technique they may rebandage you when bandages come. Reasons to remove bandages: They become soaked/soiled - wash them in the washer or hand wash then re-apply when dry     You are having worse than normal pain in your leg(s)    Shortness of breath, chest heaviness, lightheadedness, feeling ill in general - if these symptoms do not resolve after removal seek medical attention immediately!!       Bring back all bandages for each visit! Call Grace Page with any questions at [phone]. Arm Bandaging Instructions  Apply a low pH lotion to the leg(s) such as Eucerin, Aquaphor, etc.)  Apply stockinette to the arm. Roll up to expose hand. Apply finger bandages. Roll stockinette back down and insert into first thumb hole leaving the rest of stockinette over fingers for now. Apply foam starting at the base of the fingers, leaving fingers exposed, with approximately 25-50% overlap progressing up the arm. End just below the armpit. Tuck to secure. Begin wrapping the smallest bandage around the top of the hand.    [place] at the wrist 1 full turn, proceed to pinky, under the hand, through webspace of thumb across to pinky, under the hand to the base of the thumb, band to pinky and repeat about 4 times. Once you have completed about 4 times on the last rotation instead of going over to the pinky from the webspace, cross to the other side of the wrist and spiral the remainder of the bandage up the forearm. Tape to secure. With the larger bandage, anchor at the wrist and complete a herringbone/indira cross pattern on the forearm leaving about a thumbwidth of space between each. Once at the elbow, cross over the elbow (anteriorly) up to make a full anchoring turn around the bicep before coming back across the elbow (should have an X). Spiral x2-3 over elbow and either continue the spiral up the remainder of the arm or switch back to a herringbone/crisscross technique. End just below the armpit. Tape to secure. If you have bandages left over once you reach the top, loosely spiral down. Tape to secure. If it is a lot of bandages, you may want to start over. Key Points to Remember:  Bandages should be firm & snug, not overly tight. Maintain relatively consistent pressure throughout. [x] Progressing toward goals. [x] Patient would continue to benefit from skilled occupational therapy services in order to address goals below. [] No change. [] Treatment hold:   [] No further treatment/discharge  [] Other:                    Therapy Goals:   STG - To be addressed within 5 visits     Pt will demonstrate compliance of maintaining lymphedema precautions to reduce the risks of infection and further exacerbations. Progressing/ongoing 1/10/23     Pt will demonstrate independence with decongestive exercise program in order to expedite fluid rerouting. Progressing/ongoing 1/10/23        LTG To be adressed within 10 visits      Pt will demonstrate competence with SELF-MLD ( manual lymphatic drainage) in order to reroute lymphatic pathways for decreased swelling.  Progressing/ongoing 1/10/23     Pt/Caregiver will demonstrate independence with donning/doffing and wearing schedule for compression garments/ devices to maintain decreased size upon discharge.Progressing/ongoing 1/10/23     Pt to compliant with CDT in order to reduce edema in the R UE by 20+ cm total.  Progressing/ongoing 1/10/23           Pt. Education:  [x] Yes  [] No  [x] Reviewed Prior HEP/Ed  Method of Education: [x] Verbal  [x] Demo  [x] Written  Comprehension of Education:  [x] Verbalizes/demonstrates understanding  [x] Needs review  [] No understanding  Rehab potential:  [x] Good [] Fair [] Poor [x] With assist from family/caregiver        Plan:   [x] Continue current frequency toward long and short term goals.          Treatment Charges   Minutes   Units   Re-evaluation (22595)               $66.82/$50.50                                                             Manual Therapy (02881):                                      $26.27 / $20.83     Therapeutic activities (40748):                             $35.63/ $25.68     Therapeutic Exercise (26828)                              $28.50/$ 22.29     Self care/home mgmt (91279)                              $31.61/ $23.84 60 4   Vasopneumatic Device (90627)                           $8.99     Total Treatment Time    60 4         Medicare Tracking - $2,230 cap Totals   Today 103.13   Previous     Grand Total 103.13         Time In:  1300  Time Out: 1400      Electronically signed by Margarita Perez OT on 1/10/2023 at 1:05 PM

## 2023-01-12 ENCOUNTER — HOSPITAL ENCOUNTER (OUTPATIENT)
Dept: OCCUPATIONAL THERAPY | Age: 63
Setting detail: THERAPIES SERIES
Discharge: HOME | End: 2023-01-12
Payer: MEDICARE

## 2023-01-12 ENCOUNTER — APPOINTMENT (OUTPATIENT)
Dept: OCCUPATIONAL THERAPY | Age: 63
End: 2023-01-12
Payer: MEDICARE

## 2023-01-12 PROCEDURE — 97535 SELF CARE MNGMENT TRAINING: CPT

## 2023-01-12 PROCEDURE — 97140 MANUAL THERAPY 1/> REGIONS: CPT

## 2023-01-12 NOTE — FLOWSHEET NOTE
TREATMENT LOCATION:   [] [address]   [facility] [address]: [phone]  F: [phone] [x] [address]: [phone]  F: [phone]        Lymphedema Services - Treatment Note of the Upper Extremity    Date:  2023  Patient: Miriam Anderson  : 1960             MRN: [id number]  Referring Provider: Phylicia Thayer DO       Phone: [phone]  Fax: [phone]  Insurance: Medicare ([address] [place] [address] ) - visits BMN  Medical Diagnosis: I89.0  Rehab Codes: I89.0, I92.2     - hx R mastectomy  Onset Date: 10/12/22  Visit# / total visits: 3/9 scheduled; Progress note due at visit 10 per POC. ( Certification Dates: 2022 - 3/1/22)       Contraindications/Precautions:   [x] Thyroid condition (caution with MLD to neck) - on medication  [x] Age 60+     Subjective: Pt reports that bandages came off yesterday and then she wore her sleeve during the day. Reports feeling of fluid moving during MLD provided in clinic. Pain: [x] YES    [] NO     Location: generalized      Pain Rating: ( 0-10 scale) : 5/10  Comments: baseline    Plan for next session:  follow up gray foam, update measurements, self-MLD    Objective:   [x] Measurement [x] Bandaging [x] MLD [] Skin care   [x] Education [] Self-bandaging [] Self-MLD [] Wound Care   [] Exercise [] Caregiver training [] Kinesiotaping [] Other:    [] Nutrition [] Garment fitting/training [] Vasopneumatic Pump       2023 observations: arrives with spouse, wearing compression sleeve; RUE with significant firmness; good response to MLD    Circumferential Measurements   Measurements taken from nail base of D3 digit. Fingers are measured at the base.    Measurements (cm) Right Left   Dorsum 11 18.4 18.0   Wrist 17 18.2 15.1   Lower Forearm 25 21.4 16.6   Upper Forearm 33 27.1 21.0   Olecranon 40 27.9 20.8   Lower Bicep 45 28.6 20.2   Upper Bicep 55 31.7 25.8   Current: 1/12/23    1/10/23    Initial Total 11/29/2022:   173.3    174.1    169.1 xxx    xxx    137.5              Assessment:     Review bandaging technique and home plan to pt and spouse    Pt is educated on decongestive exercises to help drain fluid from the tissue. Patient demonstrates good understanding of exercises following demo. See below for more details/ hand out that was provided to patient for home follow through. Exercise Program Guidelines for Patients with Lymphedema    Perform your exercises while wearing compression garments. 5-10 reps reach and increase reps at a comfortable rate. Perform each movement slowly. Begin and end each exercise session with 5 deep belly breaths. Elevate your limb(s) after you exercise for 10-15 minutes. Do NOT perform any movements that induce pain. Perform exercises in order for best benefit. Neck Exercises:  Head Turn and Stretches: Turn head and look right; return to normal position; repeat to left side   Chin Up and Down: Bend chin down toward chest, then look up toward ceiling  Ear to shoulder: Try to touch ear to shoulder, return to normal position, repeat to left side    Trunk Exercise: Torso Twist: Turn shoulders as far as you can to the right, return to start, complete to the left  Bend forward at waist, return to starting position, bend slightly backwards. Bend sideways to the Right, bend sideways to the left. Arm Exercises:   Shoulder Shrugs: Shrug both shoulders and inhale, Lower both shoulders and exhale. Shoulder [place]: Rotate shoulders forward (like rowing a boat), then rotate shoulders backwards  Climb to nery: Hold arms above head, grasp imaginary ladder, alternate using both arms  [employer]: Lift both arms out to the side and over your head then lower. Elbow curls  Wrist curls  Hand squeezes    Manual lymph drainage completed to aid in decongestion of RUQ and to continue softening areas of firmness on RUE.  Emphasis on directing fluid toward healthy lymphatics across chest and back. Skin inspection and skin care provided via washing and applying Eucerin lotion to the RUE. Stockinette is applied over top of the arm as a protective barrier. Fingers are left free. [place] foam is custom cut and applied to volar aspect of forearm for softening in this area to promote increased flow. Rosidal foam is placed on the arm  from the lakes of the hand to the axilla. Comprilan short stretch bandage is then placed from the lakes of the hand to the axilla, with approx 50% pull. Combination of spiral and Herringbone technique is used for proper containment. [x] Progressing toward goals. [x] Patient would continue to benefit from skilled occupational therapy services in order to address goals below. [] No change. [] Treatment hold:   [] No further treatment/discharge  [] Other:                    Therapy Goals:   STG - To be addressed within 5 visits     Pt will demonstrate compliance of maintaining lymphedema precautions to reduce the risks of infection and further exacerbations. Progressing/ongoing 1/12/23     Pt will demonstrate independence with decongestive exercise program in order to expedite fluid rerouting. Progressing/ongoing 1/12/23        LTG To be adressed within 10 visits      Pt will demonstrate competence with SELF-MLD ( manual lymphatic drainage) in order to reroute lymphatic pathways for decreased swelling. Progressing/ongoing 1/10/23     Pt/Caregiver will demonstrate independence with donning/doffing and wearing schedule for compression garments/ devices to maintain decreased size upon discharge. Progressing/ongoing 1/10/23     Pt to compliant with CDT in order to reduce edema in the R UE by 20+ cm total.  Progressing/ongoing 1/12/23           Pt.  Education:  [x] Yes  [] No  [x] Reviewed Prior HEP/Ed  Method of Education: [x] Verbal  [x] Demo  [x] Written  Comprehension of Education:  [x] Verbalizes/demonstrates understanding  [x] Needs review  [] No understanding  Rehab potential:  [x] Good [] Fair [] Poor [x] With assist from family/caregiver        Plan:   [x] Continue current frequency toward long and short term goals.           Treatment Charges   Minutes   Units   Re-evaluation (28040)               $66.82/$50.50                                                             Manual Therapy (04699):                                      $26.27 / $20.83 40 3   Therapeutic activities (49619):                             $35.63/ $25.68     Therapeutic Exercise (09950)                              $28.50/$ 22.29     Self care/home mgmt (25744)                              $31.61/ $23.84 20 1   Vasopneumatic Device (20820)                           $8.99     Total Treatment Time    60 4         Medicare Tracking - $2,230 cap Totals   Today 94.1   Previous  103.13   Grand Total 197.23         Time In:  1300  Time Out: 1400      Electronically signed by Wendi Britton OT on 1/12/2023 at 1:04 PM

## 2023-01-17 ENCOUNTER — HOSPITAL ENCOUNTER (OUTPATIENT)
Dept: OCCUPATIONAL THERAPY | Age: 63
Setting detail: THERAPIES SERIES
Discharge: HOME | End: 2023-01-17
Payer: MEDICARE

## 2023-01-17 PROCEDURE — 97535 SELF CARE MNGMENT TRAINING: CPT

## 2023-01-17 PROCEDURE — 97140 MANUAL THERAPY 1/> REGIONS: CPT

## 2023-01-17 NOTE — FLOWSHEET NOTE
TREATMENT LOCATION:   [] [address]   [facility]. [address]: [phone]  F: [phone] [x] [address]: [phone]  F: [phone]        Lymphedema Services - Treatment Note of the Upper Extremity    Date:  2023  Patient: Corrie Cano  : 1960             MRN: [id number]  Referring Provider: Geremias Hansen DO       Phone: [phone]  Fax: [phone]  Insurance: Medicare ([address] [place] [address] ) - visits BMN  Medical Diagnosis: I89.0  Rehab Codes: I89.0, I92.2     - hx R mastectomy  Onset Date: 10/12/22  Visit# / total visits:  scheduled; Progress note due at visit 10 per POC. ( Certification Dates: 2022 - 3/1/22)       Contraindications/Precautions:   [x] Thyroid condition (caution with MLD to neck) - on medication  [x] Age 60+     Subjective: Pt reports that bandages came off Saturday and were re-applied by spouse, which lasted until Monday. Daytime sleeve worn during the day yesterday and today. Pain: [x] YES    [] NO     Location: generalized      Pain Rating: ( 0-10 scale) : 5/10  Comments: baseline    Plan for next session:  follow up gray foam, update measurements, review/progress self-MLD    Objective:   [x] Measurement [x] Bandaging [x] MLD [] Skin care   [x] Education [] Self-bandaging [x] Self-MLD [] Wound Care   [] Exercise [] Caregiver training [] Kinesiotaping [] Other:    [] Nutrition [] Garment fitting/training [] Vasopneumatic Pump       2023 observations: arrives with spouse, wearing compression sleeve; RUE continues with significant firmness; good response to MLD; skin softening noted in response to gray foam    Circumferential Measurements   Measurements taken from nail base of D3 digit. Fingers are measured at the base.    Measurements (cm) Right Left   Dorsum 11 18.8 18.0   Wrist 17 18.1 15.1   Lower Forearm 25 21.3 16.6   Upper Forearm 33 27.5 21.0   Olecranon 40 28.2 20.8   Lower Bicep 45 28.2 20.2   Upper Bicep 55 32.0 25.8   Current: 1/17/23    1/12/23    1/10/23    Initial Total 11/29/2022:   174.1    173.3    174.1    169.1 xxx    xxx    xxx    137.5              Assessment:     Self-MLD education initiated utilizing handouts below along with visual, verbal, and tactile cueing for proper sequence and hands on technique. Pt demo good  teachback following instruction. Self-Manual Lymphatic Drainage (MLD)    What is Manual Lymphatic Drainage (MLD)? : MLD engages healthy lymph nodes & vessels in the body to create a suctioning effect to draw fluid away from full/affected areas. Also, you are teaching your lymphatic system to reroute fluid to healthy lymph vessels. Skin stretches: gently stretch and release skin. Should be completed SLOWLY and LIGHTLY. Please do not rush or MLD is not effective. Stretches should be large enough to stretch the skin, but NOT slide over the skin. No rubbing or petting the skin. No friction. It is recommended that you complete your self-MLD 2x per day. You are welcome to complete more if you'd like. Skin on skin contact is best to achieve best stretch. It may be easiest for you to do self-MLD while you are in bed or reclined. So, you may want to complete in the AM before you get out of bed and in the PM once you get in bed. Self-MLD Pre-Treatment for Arm      1. Downward skin stretches at the right groin x10.    2. Inward skin stretches stretches at the right and left armpit x10.    3. Stretches from right armpit down to right groin x5, with extra stretches at senior care point.     4. Stretches from right armpit across chest to left armpit x5, with extra stretches at senior care point      Bonus step: Stretches from right armpit across back to left armpit x5, with extra stretches at senior care point  (need a helper for this step)        Manual lymph drainage completed to aid in decongestion of RUQ and to continue softening areas of firmness on RUE. Emphasis on directing fluid toward healthy lymphatics across chest and back. Skin inspection and skin care provided via washing and applying Eucerin lotion to the RUE. Stockinette is applied over top of the arm as a protective barrier. Fingers are left free. Additional gray foam is custom cut and applied to dorsal aspect of forearm, in addition to volar aspect, for softening in this area to promote increased flow. Rosidal foam is placed on the arm  from the lakes of the hand to the axilla. Comprilan short stretch bandage is then placed from the lakes of the hand to the axilla, with approx 50% pull. Combination of spiral and Herringbone technique is used for proper containment. [x] Progressing toward goals. [x] Patient would continue to benefit from skilled occupational therapy services in order to address goals below. [] No change. [] Treatment hold:   [] No further treatment/discharge  [] Other:                    Therapy Goals:   STG - To be addressed within 5 visits     Pt will demonstrate compliance of maintaining lymphedema precautions to reduce the risks of infection and further exacerbations. Progressing/ongoing 1/12/23     Pt will demonstrate independence with decongestive exercise program in order to expedite fluid rerouting. Progressing/ongoing 1/12/23        LTG To be adressed within 10 visits      Pt will demonstrate competence with SELF-MLD ( manual lymphatic drainage) in order to reroute lymphatic pathways for decreased swelling. Progressing/ongoing 1/17/23     Pt/Caregiver will demonstrate independence with donning/doffing and wearing schedule for compression garments/ devices to maintain decreased size upon discharge. Progressing/ongoing 1/10/23     Pt to compliant with CDT in order to reduce edema in the R UE by 20+ cm total.  Progressing/ongoing 1/17/23           Pt.  Education:  [x] Yes  [] No  [x] Reviewed Prior HEP/Ed  Method of Education: [x] Verbal  [x] Demo  [x] Written  Comprehension of Education:  [x] Verbalizes/demonstrates understanding  [x] Needs review  [] No understanding  Rehab potential:  [x] Good [] Fair [] Poor [x] With assist from family/caregiver        Plan:   [x] Continue current frequency toward long and short term goals.           Treatment Charges   Minutes   Units   Re-evaluation (85051)               $66.82/$50.50                                                             Manual Therapy (05255):                                      $26.27 / $20.83 25 2   Therapeutic activities (53066):                             $35.63/ $25.68     Therapeutic Exercise (62515)                              $28.50/$ 22.29     Self care/home mgmt (18387)                              $31.61/ $23.84 30 2   Vasopneumatic Device (08405)                           $8.99     Total Treatment Time    55 4         Medicare Tracking - $2,230 cap Totals   Today 97.11   Previous  197.23   Grand Total 294.34         Time In:  1128 Time Out: 1400      Electronically signed by Cristina Araiza OT on 1/17/2023 at 12:56 PM

## 2023-01-19 ENCOUNTER — HOSPITAL ENCOUNTER (OUTPATIENT)
Dept: OCCUPATIONAL THERAPY | Age: 63
Setting detail: THERAPIES SERIES
Discharge: HOME | End: 2023-01-19
Payer: MEDICARE

## 2023-01-19 PROCEDURE — 97535 SELF CARE MNGMENT TRAINING: CPT

## 2023-01-19 PROCEDURE — 97140 MANUAL THERAPY 1/> REGIONS: CPT

## 2023-01-19 NOTE — FLOWSHEET NOTE
TREATMENT LOCATION:   [] [address]: [phone]  F: [phone] [x] [address]: [phone]  F: [phone]        Lymphedema Services - Treatment Note of the Upper Extremity    Date:  2023  Patient: Moss Crigler  : 1960             MRN: [id number]  Referring Provider: Marcela Schofield DO       Phone: [phone]  Fax: [phone]  Insurance: Medicare ([address] [place] [address] ) - visits BMN  Medical Diagnosis: I89.0  Rehab Codes: I89.0, I92.2     - hx R mastectomy  Onset Date: 10/12/22  Visit# / total visits:  scheduled; Progress note due at visit 10 per POC. ( Certification Dates: 2022 - 3/1/22)       Contraindications/Precautions:   [x] Thyroid condition (caution with MLD to neck) - on medication  [x] Age 60+     Subjective: Pt reports that bandages came off Saturday and were re-applied by spouse, which lasted until Monday. Daytime sleeve worn during the day yesterday and today. Pain: [x] YES    [] NO     Location: generalized      Pain Rating: ( 0-10 scale) : 5/10  Comments: baseline    Plan for next session:  follow up gray foam, update measurements, review/progress self-MLD    Objective:   [x] Measurement [x] Bandaging [x] MLD [] Skin care   [x] Education [] Self-bandaging [x] Self-MLD [] Wound Care   [] Exercise [] Caregiver training [] Kinesiotaping [] Other:    [] Nutrition [] Garment fitting/training [] Vasopneumatic Pump       2023 observations: arrives with spouse, wearing compression sleeve; RUE continues with significant firmness; good response to MLD; skin softening noted in response to gray foam    Circumferential Measurements   Measurements taken from nail base of D3 digit. Fingers are measured at the base.    Measurements (cm) Right Left   Dorsum 11 18.8 18.0   Wrist 17 17.5 15.1   Lower Forearm 25 20.8 16.6   Upper Forearm 33 26.4 21.0   Olecranon 40 26.4 20.8   Lower Bicep 45 27.2 20.2   Upper Bicep 55 31.5 25.8   Current: 1/19/23    1/17/23    1/12/23    1/10/23    Initial Total 11/29/2022:   168.6    174.1    173.3    174.1    169.1 xxx    xxx    xxx    xxx    137.5              Assessment:       Self-MLD education is reviewed and progressed utilizing handouts below along with visual, verbal, and tactile cueing for proper sequence and hands on technique. Pt demo good  teachback following instruction. Self-MLD for Right Arm    **Complete self-MLD pre-treatment prior to moving onto the arm.**    Upward skin stretches from shoulder to base of neck x5    Upward skin stretches to the outside of the upper arm from just above elbow to shoulder - x5    Skin stretches from the inside of your arm to the outside of your arm. Start just above the elbow and direct stationary circles to the outside of your arm. Keep using this method as you work your way up the arm until you have reached just below armpit - x5 per section    Upward skin stretches on both sides of elbow - x5 on each side    Upward skin stretches on front of forearm and back of forearm working your way from wrist to elbow - x5 per side     Upward skin stretches on the back of the hand (palm optional) - x5    Upward skin stretchesover top of all fingers at the same time or special technique for each finger individually - x5 (complete this step if needed)    Once you have made it to the end of the arm, work your back up the arm back to step 1. You only need to complete 1-2x instead of 5x. After you get back to the shoulder, re-work the pathway from right armpit to left armpit from the pre-treatment. Manual lymph drainage completed to aid in decongestion of RUQ and to continue softening areas of firmness on RUE. Emphasis on directing fluid toward healthy lymphatics across chest and back. Skin inspection and skin care provided via washing and applying Eucerin lotion to the RUE.  Stockinepino is applied over top of the arm as a protective barrier. Fingers are left free. Additional gray foam is custom cut and applied to dorsal aspect of forearm, in addition to volar aspect, for softening in this area to promote increased flow. Rosidal foam is placed on the arm  from the lakes of the hand to the axilla. Comprilan short stretch bandage is then placed from the lakes of the hand to the axilla, with approx 50% pull. Combination of spiral and Herringbone technique is used for proper containment. [x] Progressing toward goals. [x] Patient would continue to benefit from skilled occupational therapy services in order to address goals below. [] No change. [] Treatment hold:   [] No further treatment/discharge  [] Other:                    Therapy Goals:   STG - To be addressed within 5 visits     Pt will demonstrate compliance of maintaining lymphedema precautions to reduce the risks of infection and further exacerbations. Progressing/ongoing 1/12/23     Pt will demonstrate independence with decongestive exercise program in order to expedite fluid rerouting. Progressing/ongoing 1/12/23        LTG To be adressed within 10 visits      Pt will demonstrate competence with SELF-MLD ( manual lymphatic drainage) in order to reroute lymphatic pathways for decreased swelling. Progressing/ongoing 1/17/23     Pt/Caregiver will demonstrate independence with donning/doffing and wearing schedule for compression garments/ devices to maintain decreased size upon discharge. Progressing/ongoing 1/10/23     Pt to compliant with CDT in order to reduce edema in the R UE by 20+ cm total.  Progressing/ongoing 1/17/23           Pt.  Education:  [x] Yes  [] No  [x] Reviewed Prior HEP/Ed  Method of Education: [x] Verbal  [x] Demo  [x] Written  Comprehension of Education:  [x] Verbalizes/demonstrates understanding  [x] Needs review  [] No understanding  Rehab potential:  [x] Good [] Fair [] Poor [x] With assist from family/caregiver        Plan:   [x] Continue current frequency toward long and short term goals.           Treatment Charges   Minutes   Units   Re-evaluation (37054)               $66.82/$50.50                                                             Manual Therapy (56967):                                      $26.27 / $20.83 25 2   Therapeutic activities (11436):                             $35.63/ $25.68     Therapeutic Exercise (01637)                              $28.50/$ 22.29     Self care/home mgmt (06433)                              $31.61/ $23.84 30 2   Vasopneumatic Device (28192)                           $8.99     Total Treatment Time    55 4         Medicare Tracking - $2,230 cap Totals   Today 97.11   Previous  197.23   Grand Total 294.34         Time In:  6007 Time Out: 1400      Electronically signed by Lety Yuan OT on 1/19/2023 at 1:03 PM

## 2023-01-24 ENCOUNTER — HOSPITAL ENCOUNTER (OUTPATIENT)
Dept: OCCUPATIONAL THERAPY | Age: 63
Setting detail: THERAPIES SERIES
Discharge: HOME | End: 2023-01-24
Payer: MEDICARE

## 2023-01-24 PROCEDURE — 97140 MANUAL THERAPY 1/> REGIONS: CPT

## 2023-01-24 PROCEDURE — 97535 SELF CARE MNGMENT TRAINING: CPT

## 2023-01-24 NOTE — FLOWSHEET NOTE
TREATMENT LOCATION:   [] [address]   [facility] [address]: [phone]  F: [phone] [x] [address]: [phone]  F: [phone]        Lymphedema Services - Treatment Note of the Upper Extremity    Date:  2023  Patient: Blanka Thomas  : 1960             MRN: [id number]  Referring Provider: Kaiser Hernandez DO       Phone: [phone]  Fax: [phone]  Insurance: Medicare ([address] [place] [address] ) - visits BMN  Medical Diagnosis: I89.0  Rehab Codes: I89.0, I92.2     - hx R mastectomy  Onset Date: 10/12/22  Visit# / total visits:  scheduled; Progress note due at visit 10 per POC. ( Certification Dates: 2022 - 3/1/22)       Contraindications/Precautions:   [x] Thyroid condition (caution with MLD to neck) - on medication  [x] Age 60+     Subjective: Pt reports that bandages from last visit lasted until  and she was re-wrapped by spouse on  which were removed yesterday. She states she feels puffy today. Pain: [x] YES    [] NO     Location: generalized      Pain Rating: ( 0-10 scale) : 5/10  Comments: baseline    Plan for next session:  review self-MLD PRN, vasopneumatic pump    Objective:   [x] Measurement [x] Bandaging [x] MLD [] Skin care   [x] Education [] Self-bandaging [x] Self-MLD [] Wound Care   [] Exercise [] Caregiver training [] Kinesiotaping [] Other:    [] Nutrition [] Garment fitting/training [] Vasopneumatic Pump       2023 observations: arrives with spouse, wearing compression sleeve; continued softening, slight increase in measurements    Circumferential Measurements   Measurements taken from nail base of D3 digit. Fingers are measured at the base.    Measurements (cm) Right Left   Dorsum 11 19.3 18.0   Wrist 17 18.2 15.1   Lower Forearm 25 21.5 16.6   Upper Forearm 33 26.6 21.0   Olecranon 40 28.0 20.8   Lower Bicep 45 27.9 20.2   Upper Bicep 55 31.0 25.8   Current: 1/24/23    1/19/23    1/17/23    1/12/23    1/10/23    Initial Total 11/29/2022:   172.5    168.6    174.1    173.3    174.1    169.1 xxx    xxx    xxx    xxx    xxx    137.5              Assessment:     Edu long term garments including different options and recommendations for each (daytime versus nighttime, elastic versus non-elastic), cost, and wearing schedule for options for each. Best recommendation is that pt wear daytime elastic compression sleeve and either bandage at night or utilize nighttime garment. Utilizing handout below, pt is edu on lymphedema risk reduction strategies to reduce risk of infection and further exacerbations of swelling. Lymphedema Risk Reduction Techniques    People who have had surgery or trauma to the lymphatic system should be aware of activities that put too much pressure on the affected arm or leg. Protective measures to avoid injury, swelling, and infection include:    Maintaining Proper Hygiene  Clean the skin of the affected Arm daily and apply lotion. Take proper care of the fingernails and avoid cutting cuticles too short. Clean all cuts with soap and water, and then apply antibacterial ointment and a sterile dressing. Use unscented lotion daily to keep the skin moisturized. Staying Fit  Complete lymphedema exercises regularly to improve drainage as instructed by therapist.  Gradually increase intensity of activity/task. Maintain a healthy body weight. Eat a well-balanced, low-sodium diet. Keep the Arm elevated when possible when relaxing. Do light exercises or stretching. Taking Precaution with Everyday Activities   Protect your fingers from needle pricks and sharp objects. Avoid vigorous, repetitive arm movements against resistance, such as scrubbing, pulling or pushing, with the affected arm if you are not wearing compression. Wear compression when traveling by airplane. Avoid sunburns and other burns to the affected area.   Use an electric shaver when shaving underarms  No new tattoos in the affected area. Avoid extreme heat situations such as hot tub or sauna. Wearing the Right Attire  Wear gloves when gardening or when using strong household detergents. Do not wear clothing with tight straps/elastic cuffs over affected extremity. Carry your handbag or heavy packages in the unaffected arm. Compression garments for lymphedema need to fit correctly. An ill-fitting compression garment may make lymphedema worse. Speaking Up at [facility]  Make sure that all injections are given, and blood tests are drawn in the unaffected arm. DO NOT have your blood pressure taken on affected arm. Avoid extreme hot or cold temperatures on the affected area, such as heating pads or ice packs. Notify your doctor immediately of any signs of infection, such as redness, pain, heat, increased swelling or fever. Manual lymph drainage completed to aid in decongestion of RUQ and to continue softening areas of firmness on RUE. Emphasis on directing fluid toward healthy lymphatics across chest and back. Skin inspection and skin care provided via washing and applying Eucerin lotion to the RUE. Stockinette is applied over top of the arm as a protective barrier. Fingers are left free. Raghav Loud foam is applied to dorsal aspect of forearm, in addition to volar aspect, for softening in this area to promote increased flow. Rosidal foam is placed on the arm  from the lakes of the hand to the axilla. Comprilan short stretch bandage is then placed from the lakes of the hand to the axilla, with approx 50% pull. Combination of spiral and Herringbone technique is used for proper containment. [x] Progressing toward goals. [x] Patient would continue to benefit from skilled occupational therapy services in order to address goals below. [] No change.   [] Treatment hold:   [] No further treatment/discharge  [] Other:                    Therapy Goals:   STG - To be addressed within 5 visits     Pt will demonstrate compliance of maintaining lymphedema precautions to reduce the risks of infection and further exacerbations. Met 1/24/23     Pt will demonstrate independence with decongestive exercise program in order to expedite fluid rerouting. met        LTG To be adressed within 10 visits      Pt will demonstrate competence with SELF-MLD ( manual lymphatic drainage) in order to reroute lymphatic pathways for decreased swelling. Progressing/ongoing 1/19/23     Pt/Caregiver will demonstrate independence with donning/doffing and wearing schedule for compression garments/ devices to maintain decreased size upon discharge. Progressing/ongoing 1/25/23     Pt to compliant with CDT in order to reduce edema in the R UE by 20+ cm total.  Progressing/ongoing 1/25/23           Pt. Education:  [x] Yes  [] No  [x] Reviewed Prior HEP/Ed  Method of Education: [x] Verbal  [x] Demo  [x] Written  Comprehension of Education:  [x] Verbalizes/demonstrates understanding  [x] Needs review  [] No understanding  Rehab potential:  [x] Good [] Fair [] Poor [x] With assist from family/caregiver        Plan:   [x] Continue current frequency toward long and short term goals.           Treatment Charges   Minutes   Units   Re-evaluation (74079)               $66.82/$50.50                                                             Manual Therapy (34974):                                      $26.27 / $20.83 25 2   Therapeutic activities (61853):                             $35.63/ $25.68     Therapeutic Exercise (65404)                              $28.50/$ 22.29     Self care/home mgmt (26448)                              $31.61/ $23.84 35 2   Vasopneumatic Device (89057)                           $8.99     Total Treatment Time    60 4         Medicare Tracking - $2,230 cap Totals   Today 97.11   Previous  391.45   Grand Total 488.56         Time In:  1300 Time Out: 1400      Electronically signed by Heena Ny ShadyMary [facility]ke [facility], OT on 1/24/2023 at 1:06 PM

## 2023-01-26 ENCOUNTER — HOSPITAL ENCOUNTER (OUTPATIENT)
Dept: OCCUPATIONAL THERAPY | Age: 63
Setting detail: THERAPIES SERIES
Discharge: HOME | End: 2023-01-26
Payer: MEDICARE

## 2023-01-26 PROCEDURE — 97140 MANUAL THERAPY 1/> REGIONS: CPT

## 2023-01-26 PROCEDURE — 97535 SELF CARE MNGMENT TRAINING: CPT

## 2023-01-26 PROCEDURE — 97016 VASOPNEUMATIC DEVICE THERAPY: CPT

## 2023-01-26 NOTE — FLOWSHEET NOTE
TREATMENT LOCATION:   [] [address]   [facility] [address]: [phone]  F: [phone] [x] [address]: [phone]  F: [phone]        Lymphedema Services - Treatment Note of the Upper Extremity    Date:  2023  Patient: Bentley Born  : 1960             MRN: [id number]  Referring Provider: Corinne Blair DO       Phone: [phone]  Fax: [phone]  Insurance: Medicare ([address] [place] [address] ) - visits BMN  Medical Diagnosis: I89.0  Rehab Codes: I89.0, I92.2     - hx R mastectomy  Onset Date: 10/12/22  Visit# / total visits:  scheduled; Progress note due at visit 10 per POC. ( Certification Dates: 2022 - 3/1/22)       Contraindications/Precautions:   [x] Thyroid condition (caution with MLD to neck) - on medication  [x] Age 60+     Subjective: Pt reports that her elbow feels like it was rubbing yesterday. This was the first time this has happened with the bandages. Joints are more sore today. Pain: [x] YES    [] NO     Location: generalized      Pain Rating: ( 0-10 scale) : 7/10  Comments: baseline    Plan for next session:  review self-MLD PRN, vasopneumatic pump    Objective:   [x] Measurement [x] Bandaging [x] MLD [] Skin care   [x] Education [] Self-bandaging [x] Self-MLD [] Wound Care   [] Exercise [] Caregiver training [] Kinesiotaping [] Other:    [] Nutrition [] Garment fitting/training [] Vasopneumatic Pump       2023 observations: ***    Circumferential Measurements   Measurements taken from nail base of D3 digit. Fingers are measured at the base.    Measurements (cm) Right Left   Dorsum 11 19.2 18.0   Wrist 17 17.5 15.1   Lower Forearm 25 19.5 16.6   Upper Forearm 33 25.0 21.0   Olecranon 40 25.5 20.8   Lower Bicep 45 26.7 20.2   Upper Bicep 55 29.6 25.8   Current: 1/26/23    1/24/23    1/19/23    1/17/23    1/12/23    1/10/23    Initial Total 2022: 163.0    172.5    168.6    174.1    173.3    174.1    169.1 xxx    xxx    xxx    xxx    xxx    xxx    137.5      Pre-pump chest measurement: 98.6 cm  Post-pump chest measurement: 98.6 cm        Assessment: It is recommended that pt obtain daytime compression sleeve & glove. Pt is educated on ordering process for obtaining compression arm sleeve & is measured for the following garment. Pt is agreeable for the order to be submitted on their behalf. Writer arnaldo how to [employer] utilizing sample available in clinic. Name: Janet Sandra  MRN: [id number]  Brand & Style: Benay Ahmet with Silicone Top Band  Extremity:  right  Compression: 20-30mmHg  Size: III  Width Regular/Standard  Length: Regular  Color/Pattern: Sand  Silicone Border: YES     AND    Brand & Style: [employer] [place] Glove  Extremity:  right  Compression: 20-30mmHg  Size: III  Color/Pattern: Sand  Silicone Border: YES     [facility]        Patient presents with stage II lymphedema in Right upper extremity and truncal region due to cancer treatment. Despite compliance with regular low sodium diet and conservative treatments including compression bandaging, 20-30 mmHg compression garments, elevation, self-MLD, deep breathing, and exercise for at least the past 4 weeks, the patient continues to present with the following symptoms in the affected region: hyperplasia, hyperpigmentation, fibrosis, and uncontrolled swelling. The patient has used the aforementioned interventions with no significant improvement in these symptoms. The patient trialed the [facility]  basic pump at 30 mmHg for 10 minutes. During the trial, the basic pump did not address any present truncal/chest swelling. Despite no increase in chest measurement as caused by the pump, a basic pump is not recommended as it could exacerbate the swelling in the chest/ truncal/ and axilla areas. The patient trialed the flexitouch on normal pressure for 25 minutes.  Pt with good response to advanced pump trial AEB visible reduction and palpable softening in RUE. I am recommending this patient receive a flexitouch to help decrease swelling, decrease risk of infection, and improve quality of life. This pump will safely, comfortably, and effectively improve lymphatic flow in the arm, chest, and axilla regions. During pump trial, education is provided on use of vasopneumatic pump as home management tool for lymphedema. Topics include frequency of use, duration of use, safety with donning/doffing, using a protectant layer between skin and pump to maintain skin integrity, and proper positioning for effective fluid flow while using pump. Pt is also educated on differences between advanced versus basic pump along with comparisons/contrasts of advanced pump to MLD. Skin inspection and skin care provided via washing and applying Eucerin lotion to the RUE. Stockinette is applied over top of the arm as a protective barrier. Fingers are left free. Amedeo Prior foam is applied to dorsal aspect of forearm, in addition to volar aspect, for softening in this area to promote increased flow. Rosidal foam is placed on the arm  from the lakes of the hand to the axilla. Comprilan short stretch bandage is then placed from the lakes of the hand to the axilla, with approx 50% pull. Combination of spiral and Herringbone technique is used for proper containment. [x] Progressing toward goals. [x] Patient would continue to benefit from skilled occupational therapy services in order to address goals below. [] No change. [] Treatment hold:   [] No further treatment/discharge  [] Other:                    Therapy Goals:   STG - To be addressed within 5 visits     Pt will demonstrate compliance of maintaining lymphedema precautions to reduce the risks of infection and further exacerbations.  Met 1/24/23     Pt will demonstrate independence with decongestive exercise program in order to expedite fluid rerouting. met        LTG To be adressed within 10 visits      Pt will demonstrate competence with SELF-MLD ( manual lymphatic drainage) in order to reroute lymphatic pathways for decreased swelling. Progressing/ongoing 1/19/23     Pt/Caregiver will demonstrate independence with donning/doffing and wearing schedule for compression garments/ devices to maintain decreased size upon discharge. Progressing/ongoing 1/25/23     Pt to compliant with CDT in order to reduce edema in the R UE by 20+ cm total.  Progressing/ongoing 1/25/23           Pt. Education:  [x] Yes  [] No  [x] Reviewed Prior HEP/Ed  Method of Education: [x] Verbal  [x] Demo  [x] Written  Comprehension of Education:  [x] Verbalizes/demonstrates understanding  [x] Needs review  [] No understanding  Rehab potential:  [x] Good [] Fair [] Poor [x] With assist from family/caregiver        Plan:   [x] Continue current frequency toward long and short term goals.           Treatment Charges   Minutes   Units   Re-evaluation (36306)               $66.82/$50.50                                                             Manual Therapy (17153):                                      $26.27 / $20.83 25 2   Therapeutic activities (56811):                             $35.63/ $25.68     Therapeutic Exercise (58789)                              $28.50/$ 22.29     Self care/home mgmt (31395)                              $31.61/ $23.84 35 2   Vasopneumatic Device (45512)                           $8.99     Total Treatment Time    60 4         Medicare Tracking - $2,230 cap Totals   Today 97.11   Previous  391.45   Grand Total 488.56         Time In:  1300 Time Out: 1400      Electronically signed by Bob Verdin OT on 1/26/2023 at 1:05 PM

## 2023-01-31 ENCOUNTER — HOSPITAL ENCOUNTER (OUTPATIENT)
Dept: OCCUPATIONAL THERAPY | Age: 63
Setting detail: THERAPIES SERIES
Discharge: HOME | End: 2023-01-31
Payer: MEDICARE

## 2023-01-31 PROCEDURE — 97535 SELF CARE MNGMENT TRAINING: CPT

## 2023-01-31 PROCEDURE — 97140 MANUAL THERAPY 1/> REGIONS: CPT

## 2023-01-31 NOTE — FLOWSHEET NOTE
TREATMENT LOCATION:   [] [address]   [facility] [address]: [phone]  F: [phone] [x] [address]: [phone]  F: [phone]        Lymphedema Services - Treatment Note of the Upper Extremity    Date:  2023  Patient: Samanta Babcock  : 1960             MRN: [id number]  Referring Provider: Chioma Lopez DO       Phone: [phone]  Fax: [phone]  Insurance: Medicare ([address] [place] [address] ) - visits BMN  Medical Diagnosis: I89.0  Rehab Codes: I89.0, I92.2     - hx R mastectomy  Onset Date: 10/12/22  Visit# / total visits:  scheduled; Progress note due at visit 10 per POC. ( Certification Dates: 2022 - 3/1/22)       Contraindications/Precautions:   [x] Thyroid condition (caution with MLD to neck) - on medication  [x] Age 60+     Subjective: Pt reports that she rebandaged on , took bandages off again yesterday around 3. Wore sleeve during the daytime since. Pain: [x] YES    [] NO     Location: generalized      Pain Rating: ( 0-10 scale) : 5/10  Comments: baseline    Plan for next session:  review self-MLD PRN, garment fit and train as able    Objective:   [x] Measurement [x] Bandaging [] MLD [] Skin care   [x] Education [] Self-bandaging [] Self-MLD [] Wound Care   [] Exercise [] Caregiver training [] Kinesiotaping [] Other:    [] Nutrition [] Garment fitting/training [] Vasopneumatic Pump       2023 observations: measurements remain down; fibrosis continues to soften     Circumferential Measurements   Measurements taken from nail base of D3 digit. Fingers are measured at the base.    Measurements (cm) Right Left   Dorsum 11 18.7 18.0   Wrist 17 16.6 15.1   Lower Forearm 25 19.6 16.6   Upper Forearm 33 25.2 21.0   Olecranon 40 25.7 20.8   Lower Bicep 45 26.6 20.2   Upper Bicep 55 30.2 25.8   Current: 1/31/23    1/26/23    1/24/23    1/19/23    1/17/23    1/12/23    1/10/23    Initial Total 11/29/2022:   162.6    163.0    172.5    168.6    174.1    173.3    174.1    169.1 xxx    xxx    xxx    xxx    xxx    xxx    xxx    137.5      Pre-pump chest measurement: 98.6 cm  Post-pump chest measurement: 98.6 cm        Assessment:     Edu extensively on nighttime garments utilizing samples available in the clinic including pros and cons of each types/specific features. Pt selects [employer]. Writer measures size M, regular length and assists to order via [facility]. [facility]/review long term garment wearing schedule including daytime versus nighttime garments. Edu plan for home after last scheduled visit with pt and spouse to continue bandaging until garments arrive. If nighttime garment arrives first, switch to full time wear of that. If daytime sleeve arrives first, pt may continue bandaging at nighttime with sleeve during the day. Skin inspection and skin care provided via washing and applying Eucerin lotion to the RUE. Stockinette is applied over top of the arm as a protective barrier. Fingers are left free. Michael Pranav foam is applied to dorsal aspect of forearm, in addition to volar aspect, for softening in this area to promote increased flow. Rosidal foam is placed on the arm  from the lakes of the hand to the axilla. Comprilan short stretch bandage is then placed from the lakes of the hand to the axilla, with approx 50% pull. Combination of spiral and Herringbone technique is used for proper containment. [x] Progressing toward goals. [x] Patient would continue to benefit from skilled occupational therapy services in order to address goals below. [] No change. [] Treatment hold:   [] No further treatment/discharge  [] Other:                    Therapy Goals:   STG - To be addressed within 5 visits     Pt will demonstrate compliance of maintaining lymphedema precautions to reduce the risks of infection and further exacerbations.  Met 1/24/23     Pt will demonstrate independence with decongestive exercise program in order to expedite fluid rerouting. met        LTG To be adressed within 10 visits      Pt will demonstrate competence with SELF-MLD ( manual lymphatic drainage) in order to reroute lymphatic pathways for decreased swelling. Progressing/ongoing 1/19/23     Pt/Caregiver will demonstrate independence with donning/doffing and wearing schedule for compression garments/ devices to maintain decreased size upon discharge. Progressing/ongoing 1/26/23     Pt to compliant with CDT in order to reduce edema in the R UE by 20+ cm total.  Progressing/ongoing 1/26/23           Pt. Education:  [x] Yes  [] No  [x] Reviewed Prior HEP/Ed  Method of Education: [x] Verbal  [x] Demo  [x] Written  Comprehension of Education:  [x] Verbalizes/demonstrates understanding  [x] Needs review  [] No understanding  Rehab potential:  [x] Good [] Fair [] Poor [x] With assist from family/caregiver        Plan:   [x] Continue current frequency toward long and short term goals.           Treatment Charges   Minutes   Units   Re-evaluation (79523)               $66.82/$50.50                                                             Manual Therapy (89443):                                      $26.27 / $20.83 20 1   Therapeutic activities (82397):                             $35.63/ $25.68     Therapeutic Exercise (36270)                              $28.50/$ 22.29     Self care/home mgmt (24004)                              $31.61/ $23.84 40 3   Vasopneumatic Device (98403)                           $8.99     Total Treatment Time    60 4         Medicare Tracking - $2,230 cap Totals   Today 100.12   Previous  573.83   Grand Total 673.95         Time In:  1300 Time Out: 1400      Electronically signed by Michael Harris OT on 1/31/2023 at 1:04 PM

## 2023-02-02 ENCOUNTER — HOSPITAL ENCOUNTER (OUTPATIENT)
Dept: OCCUPATIONAL THERAPY | Age: 63
Setting detail: THERAPIES SERIES
Discharge: HOME | End: 2023-02-02
Payer: MEDICARE

## 2023-02-02 PROCEDURE — 97140 MANUAL THERAPY 1/> REGIONS: CPT

## 2023-02-02 PROCEDURE — 97535 SELF CARE MNGMENT TRAINING: CPT

## 2023-02-02 NOTE — FLOWSHEET NOTE
TREATMENT LOCATION:   [] [address]   [facility] [address]: [phone]  F: [phone] [x] [address]: [phone]  F: [phone]        Lymphedema Services - Treatment Note of the Upper Extremity    Date:  2023  Patient: Rudolph Yeboah  : 1960             MRN: [id number]  Referring Provider: Vish Wren DO       Phone: [phone]  Fax: [phone]  Insurance: Medicare ([address] [place] [address] ) - visits BMN  Medical Diagnosis: I89.0  Rehab Codes: I89.0, I92.2     - hx R mastectomy  Onset Date: 10/12/22  Visit# / total visits:  scheduled; Progress note due at visit 10 per POC. ( Certification Dates: 2022 - 3/1/22)       Contraindications/Precautions:   [x] Thyroid condition (caution with MLD to neck) - on medication  [x] Age 60+     Subjective: Pt and spouse are noticing further skin softening and wrinkles. Pain: [x] YES    [] NO     Location: generalized      Pain Rating: ( 0-10 scale) : 5/10  Comments: baseline    Plan for next session:  review self-MLD PRN, garment fit and train as able    Objective:   [x] Measurement [x] Bandaging [] MLD [] Skin care   [x] Education [] Self-bandaging [] Self-MLD [] Wound Care   [] Exercise [] Caregiver training [] Kinesiotaping [] Other:    [] Nutrition [] Garment fitting/training [] Vasopneumatic Pump       2023 observations: most significant change in skin integrity between visits noted today AEB skin wrinkling and softening; measurements slightly down as well    Circumferential Measurements   Measurements taken from nail base of D3 digit. Fingers are measured at the base.    Measurements (cm) Right Left   Dorsum 11 18.7 18.0   Wrist 17 16.7 15.1   Lower Forearm 25 19.6 16.6   Upper Forearm 33 24.5 21.0   Olecranon 40 25.2 20.8   Lower Bicep 45 26.0 20.2   Upper Bicep 55 30.6 25.8   Current: 2/2/23    1/31/23    1/26/23    1/24/23    1/19/23    1/17/23    1/12/23    1/10/23    Initial Total 11/29/2022:   161.3    162.6    163.0    172.5    168.6    174.1    173.3    174.1    169.1 xxx    xxx    xxx    xxx    xxx    xxx    xxx    xxx    137.5      Pre-pump chest measurement: 98.6 cm  Post-pump chest measurement: 98.6 cm        Assessment:     Edu home management plan extensively including compression, MLD, and vasopneumatic pump. Pt is to continue bandaging until her garments arrive. Once all garments arrive, pt to return for follow up visit. Manual lymph drainage completed to aid in decongestion of RUQ and to continue softening areas of firmness on RUE. Emphasis on directing fluid toward healthy lymphatics across chest and back. Skin inspection and skin care provided via washing and applying Eucerin lotion to the RUE. Stockinette is applied over top of the arm as a protective barrier. Fingers are left free. Julián Magda foam is applied to dorsal aspect of forearm, in addition to volar aspect, for softening in this area to promote increased flow. Rosidal foam is placed on the arm  from the lakes of the hand to the axilla. Comprilan short stretch bandage is then placed from the lakes of the hand to the axilla, with approx 50% pull. Combination of spiral and Herringbone technique is used for proper containment. [x] Progressing toward goals. [x] Patient would continue to benefit from skilled occupational therapy services in order to address goals below. [] No change. [] Treatment hold:   [] No further treatment/discharge  [] Other:                    Therapy Goals:   STG - To be addressed within 5 visits     Pt will demonstrate compliance of maintaining lymphedema precautions to reduce the risks of infection and further exacerbations. Met 1/24/23     Pt will demonstrate independence with decongestive exercise program in order to expedite fluid rerouting.  met        LTG To be adressed within 10 visits      Pt will demonstrate competence with SELF-MLD ( manual lymphatic drainage) in order to reroute lymphatic pathways for decreased swelling. Progressing/ongoing 1/19/23     Pt/Caregiver will demonstrate independence with donning/doffing and wearing schedule for compression garments/ devices to maintain decreased size upon discharge. Progressing/ongoing 1/26/23     Pt to compliant with CDT in order to reduce edema in the R UE by 20+ cm total.  Progressing/ongoing 2/2/23           Pt. Education:  [x] Yes  [] No  [x] Reviewed Prior HEP/Ed  Method of Education: [x] Verbal  [x] Demo  [x] Written  Comprehension of Education:  [x] Verbalizes/demonstrates understanding  [x] Needs review  [] No understanding  Rehab potential:  [x] Good [] Fair [] Poor [x] With assist from family/caregiver        Plan:   [x] Continue current frequency toward long and short term goals.           Treatment Charges   Minutes   Units   Re-evaluation (49546)               $66.82/$50.50                                                             Manual Therapy (99784):                                      $26.27 / $20.83 30 2   Therapeutic activities (52603):                             $35.63/ $25.68     Therapeutic Exercise (03029)                              $28.50/$ 22.29     Self care/home mgmt (01464)                              $31.61/ $23.84 15 1   Vasopneumatic Device (70212)                           $8.99     Total Treatment Time    45 3         Medicare Tracking - $2,230 cap Totals   Today 76.28   Previous  673.95   Grand Total 750.23         Time In:  1300 Time Out: 7977      Electronically signed by Kimi Ochoa OT on 2/2/2023 at 1:09 PM

## 2023-02-16 ENCOUNTER — HOSPITAL ENCOUNTER (OUTPATIENT)
Dept: OCCUPATIONAL THERAPY | Age: 63
Setting detail: THERAPIES SERIES
Discharge: HOME | End: 2023-02-16
Payer: MEDICARE

## 2023-02-16 PROCEDURE — 97535 SELF CARE MNGMENT TRAINING: CPT

## 2023-02-16 NOTE — PROGRESS NOTES
TREATMENT LOCATION:   [] [address]   [address]: [phone]  F: [phone] [x] [address]: [phone]  F: [phone]        Lymphedema Services - Treatment Note of the Upper Extremity - Progress Summary    Date:  2023  Patient: Mildred Doe  : 1960             MRN: [id number]  Referring Provider: Susan Gallego DO       Phone: [phone]  Fax: [phone]  Insurance: Medicare ([address] [place] [address] ) - visits BMN  Medical Diagnosis: I89.0  Rehab Codes: I89.0, I92.2     - hx R mastectomy  Onset Date: 10/12/22  Visit# / total visits: 10/10 scheduled; Progress note due at visit 13per POC. ( Certification Dates: 23 - 23)       Contraindications/Precautions:   [x] Thyroid condition (caution with MLD to neck) - on medication  [x] Age 60+     Subjective: Pt and spouse report compliance with home compression thus far. Pain: [x] YES    [] NO     Location: generalized      Pain Rating: ( 0-10 scale) : 5/10  Comments: baseline    Plan for next session:  review home programming, discharge    Objective:   [x] Measurement [] Bandaging [] MLD [] Skin care   [x] Education [] Self-bandaging [] Self-MLD [] Wound Care   [] Exercise [] Caregiver training [] Kinesiotaping [] Other:    [] Nutrition [x] Garment fitting/training [] Vasopneumatic Pump       2023 observations: measurements remain down overall and skin has maintained improvements with reduction in fibrosis    Circumferential Measurements   Measurements taken from nail base of D3 digit. Fingers are measured at the base.    Measurements (cm) Right Left   Dorsum 11 18.6 18.0   Wrist 17 17.3 15.1   Lower Forearm 25 20.5 16.6   Upper Forearm 33 25.4 21.0   Olecranon 40 25.7 20.8   Lower Bicep 45 27.0 20.2   Upper Bicep 55 28.8 25.8   Current: 2/16/23    2/2/23    1/31/23    1/26/23    1/24/23    1/19/23    1/17/23    1/12/23    1/10/23    Initial Total 11/29/2022:   163.3    161.3    162.6    163.0    172.5    168.6    174.1    173.3    174.1    169.1 xxx    xxx    xxx    xxx    xxx    xxx    xxx    xxx    137.5      Pre-pump chest measurement: 98.6 cm  Post-pump chest measurement: 98.6 cm        Assessment:     Garment fit and train for compression sleeve and glove of which both are good fit. Review edu on wearing schedule between compression sleeve/glove and velcro wrap. Pt reporting no issues with velcro wrap - they forgot garment at home today. Review long term management extensively with pt to continue to wear compression daily. Pt reporting no further needs at this time, but would like to be able to follow up if needed for review. Hold for 60 days. [x] Progressing toward goals. [x] Patient would continue to benefit from skilled occupational therapy services in order to address goals below. [] No change. [x] Treatment hold: 60 day hold  [] No further treatment/discharge  [] Other:                    Therapy Goals:   STG - To be addressed within 5 visits     Pt will demonstrate compliance of maintaining lymphedema precautions to reduce the risks of infection and further exacerbations. Met 1/24/23     Pt will demonstrate independence with decongestive exercise program in order to expedite fluid rerouting. met        LTG To be adressed within 10 visits      Pt will demonstrate competence with SELF-MLD ( manual lymphatic drainage) in order to reroute lymphatic pathways for decreased swelling. met     Pt/Caregiver will demonstrate independence with donning/doffing and wearing schedule for compression garments/ devices to maintain decreased size upon discharge. Progressing/continue 2/16/23     Pt to compliant with CDT in order to reduce edema in the R UE by 20+ cm total.  Progressing 2/16/23           Pt.  Education:  [x] Yes  [] No  [x] Reviewed Prior HEP/Ed  Method of Education: [x] Verbal  [] Demo  [] Written  Comprehension of Education:  [x] Verbalizes/demonstrates understanding  [] Needs review  [] No understanding  Rehab potential:  [x] Good [] Fair [] Poor [x] With assist from family/caregiver        Plan:   [x] Continue x3 visits         Treatment Charges   Minutes   Units   Re-evaluation (06983)               $66.82/$50.50                                                             Manual Therapy (85355):                                      $26.27 / $20.83     Therapeutic activities ():                             $35.63/ $25.68     Therapeutic Exercise (01862)                              $28.50/$ 22.29     Self care/home mgmt (02091)                              $31.61/ $23.84 30 2   Vasopneumatic Device (90993)                           $8.99     Total Treatment Time    30 2         Medicare Tracking - $2,230 cap Totals   Today 55.45   Previous  750.23   Grand Total 805.68         Time In:  1400  Time Out: 1430      Electronically signed by Frederick Becerra OT on 2/16/2023 at 2:05 PM

## 2023-02-24 RX ORDER — DILTIAZEM HYDROCHLORIDE 180 MG/1
CAPSULE, COATED, EXTENDED RELEASE ORAL
Qty: 30 CAPSULE | Refills: 3 | Status: SHIPPED | OUTPATIENT
Start: 2023-02-24

## 2023-03-24 ENCOUNTER — OFFICE VISIT (OUTPATIENT)
Dept: PRIMARY CARE CLINIC | Age: 63
End: 2023-03-24

## 2023-03-24 VITALS
SYSTOLIC BLOOD PRESSURE: 128 MMHG | BODY MASS INDEX: 25.94 KG/M2 | HEIGHT: 61 IN | WEIGHT: 137.4 LBS | DIASTOLIC BLOOD PRESSURE: 70 MMHG | OXYGEN SATURATION: 98 % | HEART RATE: 98 BPM

## 2023-03-24 DIAGNOSIS — Z12.31 BREAST CANCER SCREENING BY MAMMOGRAM: ICD-10-CM

## 2023-03-24 DIAGNOSIS — Z00.00 MEDICARE ANNUAL WELLNESS VISIT, SUBSEQUENT: Primary | ICD-10-CM

## 2023-03-24 DIAGNOSIS — G82.54 INCOMPLETE QUADRIPLEGIA AT C5-6 LEVEL (HCC): ICD-10-CM

## 2023-03-24 ASSESSMENT — PATIENT HEALTH QUESTIONNAIRE - PHQ9
SUM OF ALL RESPONSES TO PHQ QUESTIONS 1-9: 0
1. LITTLE INTEREST OR PLEASURE IN DOING THINGS: 0
SUM OF ALL RESPONSES TO PHQ QUESTIONS 1-9: 0
2. FEELING DOWN, DEPRESSED OR HOPELESS: 0
SUM OF ALL RESPONSES TO PHQ QUESTIONS 1-9: 0
SUM OF ALL RESPONSES TO PHQ QUESTIONS 1-9: 0
SUM OF ALL RESPONSES TO PHQ9 QUESTIONS 1 & 2: 0

## 2023-03-24 NOTE — PROGRESS NOTES
(Family Medicine)  Danielle Estrada MD as PCP - Empaneled Provider  Danielle Estrada MD (Family Medicine)  Carri Lucas MD as Consulting Physician (Gastroenterology)  Carri Lucas MD as Consulting Physician (Gastroenterology)     Reviewed and updated this visit:  Tobacco  Allergies  Meds  Problems  Med Hx  Surg Hx  Soc Hx  Fam Hx               Danielle Estrada MD

## 2023-03-24 NOTE — PATIENT INSTRUCTIONS
in full while other may be subject to a deductible, co-insurance, and/or copay. Some of these benefits include a comprehensive review of your medical history including lifestyle, illnesses that may run in your family, and various assessments and screenings as appropriate. After reviewing your medical record and screening and assessments performed today your provider may have ordered immunizations, labs, imaging, and/or referrals for you. A list of these orders (if applicable) as well as your Preventive Care list are included within your After Visit Summary for your review. Other Preventive Recommendations:    A preventive eye exam performed by an eye specialist is recommended every 1-2 years to screen for glaucoma; cataracts, macular degeneration, and other eye disorders. A preventive dental visit is recommended every 6 months. Try to get at least 150 minutes of exercise per week or 10,000 steps per day on a pedometer . Order or download the FREE \"Exercise & Physical Activity: Your Everyday Guide\" from [employer]. Call 5-292.441.2213 or search [employer] online. You need 0725-3624 mg of calcium and 9029-5164 IU of vitamin D per day. It is possible to meet your calcium requirement with diet alone, but a vitamin D supplement is usually necessary to meet this goal.  When exposed to the sun, use a sunscreen that protects against both UVA and UVB radiation with an SPF of 30 or greater. Reapply every 2 to 3 hours or after sweating, drying off with a towel, or swimming. Always wear a seat belt when traveling in a car. Always wear a helmet when riding a bicycle or motorcycle.

## 2023-04-05 ENCOUNTER — CLINICAL DOCUMENTATION (OUTPATIENT)
Dept: PHYSICAL THERAPY | Age: 63
End: 2023-04-05

## 2023-04-05 NOTE — THERAPY DISCHARGE
more muscles  [address]                If you have any questions or concerns regarding this patient's care, please contact us.    Thank you for your referral.      Electronically signed by: Marleni Leos PT

## 2023-04-07 ENCOUNTER — HOSPITAL ENCOUNTER (OUTPATIENT)
Facility: CLINIC | Age: 63
Discharge: HOME | End: 2023-04-07
Payer: COMMERCIAL

## 2023-04-07 ENCOUNTER — HOSPITAL ENCOUNTER (OUTPATIENT)
Dept: GENERAL RADIOLOGY | Facility: CLINIC | Age: 63
End: 2023-04-07
Payer: COMMERCIAL

## 2023-04-07 DIAGNOSIS — M43.8X2 SWAN NECK DEFORMITY OF CERVICAL SPINE: ICD-10-CM

## 2023-04-07 DIAGNOSIS — M48.02 STENOSIS OF CERVICAL SPINE WITH MYELOPATHY (HCC): ICD-10-CM

## 2023-04-07 DIAGNOSIS — G99.2 STENOSIS OF CERVICAL SPINE WITH MYELOPATHY (HCC): ICD-10-CM

## 2023-04-07 PROCEDURE — 72050 X-RAY EXAM NECK SPINE 4/5VWS: CPT

## 2023-04-07 PROCEDURE — 72040 X-RAY EXAM NECK SPINE 2-3 VW: CPT

## 2023-04-17 ENCOUNTER — OFFICE VISIT (OUTPATIENT)
Dept: NEUROSURGERY | Age: 63
End: 2023-04-17
Payer: MEDICARE

## 2023-04-17 VITALS
OXYGEN SATURATION: 91 % | WEIGHT: 137 LBS | HEIGHT: 61 IN | SYSTOLIC BLOOD PRESSURE: 129 MMHG | HEART RATE: 86 BPM | BODY MASS INDEX: 25.86 KG/M2 | DIASTOLIC BLOOD PRESSURE: 77 MMHG

## 2023-04-17 DIAGNOSIS — Q76.49 SPINAL DEFORMITY: ICD-10-CM

## 2023-04-17 DIAGNOSIS — M48.02 STENOSIS OF CERVICAL SPINE WITH MYELOPATHY (HCC): ICD-10-CM

## 2023-04-17 DIAGNOSIS — M43.8X2 SWAN NECK DEFORMITY OF CERVICAL SPINE: Primary | ICD-10-CM

## 2023-04-17 DIAGNOSIS — G99.2 STENOSIS OF CERVICAL SPINE WITH MYELOPATHY (HCC): ICD-10-CM

## 2023-04-17 DIAGNOSIS — M48.062 LUMBAR STENOSIS WITH NEUROGENIC CLAUDICATION: ICD-10-CM

## 2023-04-17 DIAGNOSIS — M85.88 OSTEOPENIA OF LUMBAR SPINE: ICD-10-CM

## 2023-04-17 PROCEDURE — 3078F DIAST BP <80 MM HG: CPT | Performed by: NEUROLOGICAL SURGERY

## 2023-04-17 PROCEDURE — 99214 OFFICE O/P EST MOD 30 MIN: CPT | Performed by: NEUROLOGICAL SURGERY

## 2023-04-17 PROCEDURE — G8427 DOCREV CUR MEDS BY ELIG CLIN: HCPCS | Performed by: NEUROLOGICAL SURGERY

## 2023-04-17 PROCEDURE — G8419 CALC BMI OUT NRM PARAM NOF/U: HCPCS | Performed by: NEUROLOGICAL SURGERY

## 2023-04-17 PROCEDURE — 1036F TOBACCO NON-USER: CPT | Performed by: NEUROLOGICAL SURGERY

## 2023-04-17 PROCEDURE — 3017F COLORECTAL CA SCREEN DOC REV: CPT | Performed by: NEUROLOGICAL SURGERY

## 2023-04-17 PROCEDURE — 3074F SYST BP LT 130 MM HG: CPT | Performed by: NEUROLOGICAL SURGERY

## 2023-04-17 NOTE — PROGRESS NOTES
BACK SURGERY      lower back x 3, cervical- x 1: C4- C6, 11/4/2014     BREAST SURGERY Right     mastectomy with reconstruction    CERVICAL FUSION N/A 4/12/2022    C5 CORPECTOMY, USE OF INTRAOPERATIVE CERVICAL TRACTION [facility] N/A 4/12/2022    POSTERIOR FIXATION C2-C7 performed by Adela Currie DO at [address]  about 2018    HERNIA REPAIR Bilateral     inguinal    HYSTERECTOMY, TOTAL ABDOMINAL (CERVIX REMOVED)      LUMBAR SPINE SURGERY N/A 12/30/2021    LUMBAR LAMINECTOMY L2-S1 [facility], RAD[facility]      OTHER SURGICAL HISTORY  04/12/2022    C5 CORPECTOMY, USE OF INTRAOPERATIVE CERVICAL TRACTION (N/A Spine Cervical)      Family History   Problem Relation Age of Onset    Hypertension Mother     Heart Disease Mother     Cancer Mother      Current Outpatient Medications on File Prior to Visit   Medication Sig Dispense Refill    dilTIAZem (CARDIZEM CD) 180 MG extended release capsule TAKE ONE CAPSULE BY MOUTH DAILY 30 capsule 3    levothyroxine (SYNTHROID) 100 MCG tablet TAKE ONE TABLET BY MOUTH DAILY 30 tablet 3    lisinopril (PRINIVIL;ZESTRIL) 5 MG tablet TAKE ONE TABLET BY MOUTH DAILY 30 tablet 3    baclofen (LIORESAL) 10 MG tablet Take 1 tablet by mouth 5 times daily 1 PO TID with 2 tabs PO at HS = 5 tabs daily 150 tablet 2    pantoprazole (PROTONIX) 40 MG tablet TAKE ONE TABLET BY MOUTH DAILY 90 tablet 1    busPIRone (BUSPAR) 30 MG tablet TAKE ONE TABLET BY MOUTH TWICE A DAY 60 tablet 3    aspirin EC 81 MG EC tablet Take 1 tablet by mouth daily 90 tablet 1    Handicap Placard MISC by Does not apply route Cannot walk 200 feet due to medical issues  Expires 7/14/2027 1 each 0    HYDROcodone-acetaminophen (NORCO)  MG per tablet Take 1 tablet by mouth every 6 hours as needed for Pain. methadone (DOLOPHINE) 10 MG tablet Take 1 tablet by mouth 2 times daily.        No current facility-administered

## 2023-04-20 RX ORDER — LISINOPRIL 5 MG/1
TABLET ORAL
Qty: 30 TABLET | Refills: 3 | Status: SHIPPED | OUTPATIENT
Start: 2023-04-20

## 2023-04-20 NOTE — TELEPHONE ENCOUNTER
LAST VISIT:   3/24/2023     Future Appointments   Date Time Provider [place]   5/25/2023  4:00 PM Helio Wagner MD [facility] med/reha [facility]   7/3/2023  3:00 PM DO Geov[facility] Bach [facility]

## 2023-04-25 RX ORDER — LEVOTHYROXINE SODIUM 0.1 MG/1
100 TABLET ORAL DAILY
Qty: 30 TABLET | Refills: 5 | Status: SHIPPED | OUTPATIENT
Start: 2023-04-25

## 2023-04-27 ENCOUNTER — HOSPITAL ENCOUNTER (OUTPATIENT)
Dept: CT IMAGING | Facility: CLINIC | Age: 63
Discharge: HOME | End: 2023-04-29
Payer: COMMERCIAL

## 2023-04-27 DIAGNOSIS — M48.02 STENOSIS OF CERVICAL SPINE WITH MYELOPATHY (HCC): ICD-10-CM

## 2023-04-27 DIAGNOSIS — G99.2 STENOSIS OF CERVICAL SPINE WITH MYELOPATHY (HCC): ICD-10-CM

## 2023-04-27 DIAGNOSIS — M43.8X2 SWAN NECK DEFORMITY OF CERVICAL SPINE: ICD-10-CM

## 2023-04-27 PROCEDURE — 72125 CT NECK SPINE W/O DYE: CPT

## 2023-05-11 ENCOUNTER — CLINICAL DOCUMENTATION (OUTPATIENT)
Dept: OCCUPATIONAL THERAPY | Age: 63
End: 2023-05-11

## 2023-05-11 NOTE — DISCHARGE SUMMARY
TREATMENT LOCATION:   [] [facility]  [address]  P: [phone]  F: [phone] [x] [address]: [phone]  F: [phone]     Lymphedema Therapy Discharge Note    Date: 2023      Patient: Rekha Allen  : 1960  MRN: [id number]    Referring Provider: Shruti Agustin DO       Phone: [phone]                  Fax: [phone]  Insurance: Medicare ([facility] ) - visits BMN  Medical Diagnosis: I89.0  Rehab Codes: I89.0, I92.2     - hx R mastectomy  Onset Date: 10/12/22      Total visits attended: 10        Cancels/No shows: 0  Date of initial visit: 22                Date of final visit: 23      Subjective:  Refer to initial evaluation/ treatment notes. Objective:  Refer to initial evaluation/treatment notes. Assessment:  Refer to initial evaluation/ treatment notes. Treatment to Date:  [] Therapeutic Exercise           [x] Instruction in Home Program                       [x] Manual Therapy  [x] Self-Care/Home Management  [x] Vasopneumatic Pump             [] Other:    Discharge Status:   [] Treatment goals were met. [] Pt received maximum benefit. No further therapy indicated at this time. [x] Pt to continue exercise/home instructions independently  [x] Pt has not called or returned to clinic in over 90 days with additional concerns. Comments: KENDY on 23 regarding upcoming discharge if nothing heard back from patient. Lymphedema Life Impact Scale:  [] Eval 13% functionally impaired  [x] D/C unable to collect                  Electronically signed by Nadia Coles OT on 2023 at 9:58 AM    The patient is being discharged due to the status listed above. If patient would like to return to the clinic for additional therapy a new referral will be necessary. Thank you again for your referral and allowing us to assist in the care of this patient!       For

## 2023-05-18 ENCOUNTER — HOSPITAL ENCOUNTER (OUTPATIENT)
Dept: WOMENS IMAGING | Age: 63
Discharge: HOME | End: 2023-05-20
Payer: MEDICARE

## 2023-05-18 DIAGNOSIS — M85.88 OSTEOPENIA OF LUMBAR SPINE: ICD-10-CM

## 2023-05-18 DIAGNOSIS — Z12.31 BREAST CANCER SCREENING BY MAMMOGRAM: ICD-10-CM

## 2023-05-18 PROCEDURE — 77080 DXA BONE DENSITY AXIAL: CPT

## 2023-05-18 PROCEDURE — 77063 BREAST TOMOSYNTHESIS BI: CPT

## 2023-05-22 RX ORDER — PANTOPRAZOLE SODIUM 40 MG/1
TABLET, DELAYED RELEASE ORAL
Qty: 90 TABLET | Refills: 0 | Status: SHIPPED | OUTPATIENT
Start: 2023-05-22

## 2023-05-25 ENCOUNTER — TELEPHONE (OUTPATIENT)
Dept: PRIMARY CARE CLINIC | Age: 63
End: 2023-05-25

## 2023-06-27 RX ORDER — DILTIAZEM HYDROCHLORIDE 180 MG/1
CAPSULE, COATED, EXTENDED RELEASE ORAL
Qty: 30 CAPSULE | Refills: 3 | Status: SHIPPED | OUTPATIENT
Start: 2023-06-27

## 2023-07-03 ENCOUNTER — OFFICE VISIT (OUTPATIENT)
Dept: NEUROSURGERY | Age: 63
End: 2023-07-03
Payer: MEDICARE

## 2023-07-03 ENCOUNTER — TELEPHONE (OUTPATIENT)
Dept: PRIMARY CARE CLINIC | Age: 63
End: 2023-07-03

## 2023-07-03 VITALS
HEIGHT: 61 IN | WEIGHT: 137 LBS | BODY MASS INDEX: 25.86 KG/M2 | SYSTOLIC BLOOD PRESSURE: 138 MMHG | DIASTOLIC BLOOD PRESSURE: 84 MMHG | HEART RATE: 103 BPM

## 2023-07-03 DIAGNOSIS — Q76.49 SPINAL DEFORMITY: ICD-10-CM

## 2023-07-03 DIAGNOSIS — M48.02 STENOSIS OF CERVICAL SPINE WITH MYELOPATHY (HCC): ICD-10-CM

## 2023-07-03 DIAGNOSIS — G95.9 MYELOPATHY (HCC): ICD-10-CM

## 2023-07-03 DIAGNOSIS — M48.062 LUMBAR STENOSIS WITH NEUROGENIC CLAUDICATION: ICD-10-CM

## 2023-07-03 DIAGNOSIS — G99.2 STENOSIS OF CERVICAL SPINE WITH MYELOPATHY (HCC): ICD-10-CM

## 2023-07-03 DIAGNOSIS — M43.8X2 SWAN NECK DEFORMITY OF CERVICAL SPINE: Primary | ICD-10-CM

## 2023-07-03 DIAGNOSIS — M81.8 OTHER OSTEOPOROSIS WITHOUT CURRENT PATHOLOGICAL FRACTURE: Primary | ICD-10-CM

## 2023-07-03 PROCEDURE — 99214 OFFICE O/P EST MOD 30 MIN: CPT | Performed by: NEUROLOGICAL SURGERY

## 2023-07-03 PROCEDURE — G8427 DOCREV CUR MEDS BY ELIG CLIN: HCPCS | Performed by: NEUROLOGICAL SURGERY

## 2023-07-03 PROCEDURE — G8419 CALC BMI OUT NRM PARAM NOF/U: HCPCS | Performed by: NEUROLOGICAL SURGERY

## 2023-07-03 PROCEDURE — 3075F SYST BP GE 130 - 139MM HG: CPT | Performed by: NEUROLOGICAL SURGERY

## 2023-07-03 PROCEDURE — 1036F TOBACCO NON-USER: CPT | Performed by: NEUROLOGICAL SURGERY

## 2023-07-03 PROCEDURE — 3079F DIAST BP 80-89 MM HG: CPT | Performed by: NEUROLOGICAL SURGERY

## 2023-07-03 PROCEDURE — 3017F COLORECTAL CA SCREEN DOC REV: CPT | Performed by: NEUROLOGICAL SURGERY

## 2023-07-03 NOTE — TELEPHONE ENCOUNTER
Patient needs treatment for osteoporosis per her neurosurgeon  Please have her make an appointment to discuss  She will need a BMP blood test prior to starting medication.

## 2023-07-12 ENCOUNTER — HOSPITAL ENCOUNTER (OUTPATIENT)
Dept: GENERAL RADIOLOGY | Facility: CLINIC | Age: 63
Discharge: HOME | End: 2023-07-14
Payer: MEDICARE

## 2023-07-12 ENCOUNTER — HOSPITAL ENCOUNTER (OUTPATIENT)
Facility: CLINIC | Age: 63
Discharge: HOME | End: 2023-07-14
Payer: MEDICARE

## 2023-07-12 ENCOUNTER — HOSPITAL ENCOUNTER (OUTPATIENT)
Dept: MRI IMAGING | Facility: CLINIC | Age: 63
Discharge: HOME | End: 2023-07-14
Payer: MEDICARE

## 2023-07-12 DIAGNOSIS — Q76.49 SPINAL DEFORMITY: ICD-10-CM

## 2023-07-12 DIAGNOSIS — G95.9 MYELOPATHY (HCC): ICD-10-CM

## 2023-07-12 PROCEDURE — 72146 MRI CHEST SPINE W/O DYE: CPT

## 2023-07-12 PROCEDURE — 72082 X-RAY EXAM ENTIRE SPI 2/3 VW: CPT

## 2023-07-25 RX ORDER — PANTOPRAZOLE SODIUM 40 MG/1
40 TABLET, DELAYED RELEASE ORAL DAILY
Qty: 90 TABLET | Refills: 1 | Status: SHIPPED | OUTPATIENT
Start: 2023-07-25

## 2023-07-25 NOTE — TELEPHONE ENCOUNTER
LAST VISIT:   3/24/2023     Future Appointments   Date Time Provider [address]   10/9/2023  9:30 AM Buckner Deep, DO [facility]

## 2023-07-26 RX ORDER — LEVOTHYROXINE SODIUM 0.1 MG/1
100 TABLET ORAL DAILY
Qty: 30 TABLET | Refills: 5 | Status: SHIPPED | OUTPATIENT
Start: 2023-07-26

## 2023-07-26 RX ORDER — LISINOPRIL 5 MG/1
5 TABLET ORAL DAILY
Qty: 30 TABLET | Refills: 3 | Status: SHIPPED | OUTPATIENT
Start: 2023-07-26

## 2023-07-26 NOTE — TELEPHONE ENCOUNTER
LAST VISIT:   3/24/2023     Future Appointments   Date Time Provider [address]   10/9/2023  9:30 AM DO Geovanny Delgado Neuro [facility]

## 2023-07-26 NOTE — TELEPHONE ENCOUNTER
LAST VISIT:   3/24/2023     Future Appointments   Date Time Provider [address]   10/9/2023  9:30 AM Veta Hamman, DO [facility]

## 2023-08-22 ENCOUNTER — TELEPHONE (OUTPATIENT)
Dept: NEUROSURGERY | Age: 63
End: 2023-08-22

## 2023-08-22 NOTE — TELEPHONE ENCOUNTER
Patient has called and left a voicemail requesting for MRI and XR results to be faxed over to her pain management provider.     MRI Thoracic and Scoliosis XR results have been faxed to:  ------------------------------------------  [address] [address] Pain Management - [address] [facility]  Phone: [phone]  General Fax: [phone]  Referral Fax: [phone]

## 2023-10-09 ENCOUNTER — OFFICE VISIT (OUTPATIENT)
Dept: NEUROSURGERY | Age: 63
End: 2023-10-09
Payer: MEDICARE

## 2023-10-09 VITALS
OXYGEN SATURATION: 94 % | SYSTOLIC BLOOD PRESSURE: 125 MMHG | WEIGHT: 135 LBS | BODY MASS INDEX: 25.51 KG/M2 | TEMPERATURE: 98.7 F | DIASTOLIC BLOOD PRESSURE: 70 MMHG | HEART RATE: 88 BPM | RESPIRATION RATE: 18 BRPM

## 2023-10-09 DIAGNOSIS — Q76.49 SPINAL DEFORMITY: ICD-10-CM

## 2023-10-09 DIAGNOSIS — G99.2 STENOSIS OF CERVICAL SPINE WITH MYELOPATHY (HCC): ICD-10-CM

## 2023-10-09 DIAGNOSIS — M48.062 LUMBAR STENOSIS WITH NEUROGENIC CLAUDICATION: ICD-10-CM

## 2023-10-09 DIAGNOSIS — M48.02 STENOSIS OF CERVICAL SPINE WITH MYELOPATHY (HCC): ICD-10-CM

## 2023-10-09 DIAGNOSIS — M43.8X2 SWAN NECK DEFORMITY OF CERVICAL SPINE: Primary | ICD-10-CM

## 2023-10-09 PROCEDURE — 3074F SYST BP LT 130 MM HG: CPT | Performed by: NEUROLOGICAL SURGERY

## 2023-10-09 PROCEDURE — G8484 FLU IMMUNIZE NO ADMIN: HCPCS | Performed by: NEUROLOGICAL SURGERY

## 2023-10-09 PROCEDURE — 3017F COLORECTAL CA SCREEN DOC REV: CPT | Performed by: NEUROLOGICAL SURGERY

## 2023-10-09 PROCEDURE — G8419 CALC BMI OUT NRM PARAM NOF/U: HCPCS | Performed by: NEUROLOGICAL SURGERY

## 2023-10-09 PROCEDURE — 3078F DIAST BP <80 MM HG: CPT | Performed by: NEUROLOGICAL SURGERY

## 2023-10-09 PROCEDURE — G8427 DOCREV CUR MEDS BY ELIG CLIN: HCPCS | Performed by: NEUROLOGICAL SURGERY

## 2023-10-09 PROCEDURE — 99213 OFFICE O/P EST LOW 20 MIN: CPT | Performed by: NEUROLOGICAL SURGERY

## 2023-10-09 PROCEDURE — 1036F TOBACCO NON-USER: CPT | Performed by: NEUROLOGICAL SURGERY

## 2023-10-09 NOTE — PROGRESS NOTES
No follow-ups on file. Prescriptions Ordered:  No orders of the defined types were placed in this encounter. Orders Placed:  No orders of the defined types were placed in this encounter. Electronically signed by Kike Bales DO on 10/9/2023 at 10:24 AM    Please note that this chart was generated using voice recognition Dragon dictation software. Although every effort was made to ensure the accuracy of this automated transcription, some errors in transcription may have occurred.

## 2023-10-11 RX ORDER — LISINOPRIL 5 MG/1
5 TABLET ORAL DAILY
Qty: 30 TABLET | Refills: 3 | Status: SHIPPED | OUTPATIENT
Start: 2023-10-11

## 2023-10-11 RX ORDER — DILTIAZEM HYDROCHLORIDE 180 MG/1
180 CAPSULE, COATED, EXTENDED RELEASE ORAL DAILY
Qty: 30 CAPSULE | Refills: 3 | Status: SHIPPED | OUTPATIENT
Start: 2023-10-11

## 2023-10-25 RX ORDER — LEVOTHYROXINE SODIUM 0.1 MG/1
100 TABLET ORAL DAILY
Qty: 90 TABLET | Refills: 1 | Status: SHIPPED | OUTPATIENT
Start: 2023-10-25

## 2023-10-25 NOTE — TELEPHONE ENCOUNTER
LAST VISIT:   3/24/2023     Future Appointments   Date Time Provider [address]   1/5/2024  9:00 AM MD DANITA Spears

## 2023-12-12 ENCOUNTER — APPOINTMENT (OUTPATIENT)
Dept: GENERAL RADIOLOGY | Age: 63
DRG: 311 | End: 2023-12-12
Payer: MEDICARE

## 2023-12-12 ENCOUNTER — HOSPITAL ENCOUNTER (INPATIENT)
Age: 63
LOS: 8 days | Discharge: NURSING, REHAB OR LONG-TERM CARE | DRG: 311 | End: 2023-12-20
Attending: EMERGENCY MEDICINE | Admitting: INTERNAL MEDICINE
Payer: MEDICARE

## 2023-12-12 DIAGNOSIS — Z91.89 LACK OF MOTIVATION: ICD-10-CM

## 2023-12-12 DIAGNOSIS — R63.0 DECREASED APPETITE: ICD-10-CM

## 2023-12-12 DIAGNOSIS — R53.83 FATIGUE, UNSPECIFIED TYPE: Primary | ICD-10-CM

## 2023-12-12 DIAGNOSIS — R79.89 ELEVATED TROPONIN: ICD-10-CM

## 2023-12-12 LAB
ALBUMIN SERPL-MCNC: 4.2 G/DL (ref 3.5–5.2)
ALP SERPL-CCNC: 177 U/L (ref 35–104)
ALT SERPL-CCNC: 83 U/L (ref 5–33)
ANION GAP SERPL CALCULATED.3IONS-SCNC: 23 MMOL/L (ref 9–17)
AST SERPL-CCNC: 183 U/L
BASOPHILS # BLD: 0.04 K/UL (ref 0–0.2)
BASOPHILS NFR BLD: 1 % (ref 0–2)
BILIRUB SERPL-MCNC: 2.7 MG/DL (ref 0.3–1.2)
BUN SERPL-MCNC: 15 MG/DL (ref 8–23)
CALCIUM SERPL-MCNC: 8.6 MG/DL (ref 8.6–10.4)
CHLORIDE SERPL-SCNC: 91 MMOL/L (ref 98–107)
CO2 SERPL-SCNC: 23 MMOL/L (ref 20–31)
CREAT SERPL-MCNC: 0.6 MG/DL (ref 0.5–0.9)
EOSINOPHIL # BLD: 0 K/UL (ref 0–0.4)
EOSINOPHILS RELATIVE PERCENT: 0 % (ref 0–4)
ERYTHROCYTE [DISTWIDTH] IN BLOOD BY AUTOMATED COUNT: 14.7 % (ref 11.5–14.9)
GFR SERPL CREATININE-BSD FRML MDRD: >60 ML/MIN/1.73M2
GLUCOSE BLD-MCNC: 68 MG/DL (ref 65–105)
GLUCOSE SERPL-MCNC: 67 MG/DL (ref 70–99)
HCT VFR BLD AUTO: 35.3 % (ref 36–46)
HGB BLD-MCNC: 11.8 G/DL (ref 12–16)
LYMPHOCYTES NFR BLD: 0.47 K/UL (ref 1–4.8)
LYMPHOCYTES RELATIVE PERCENT: 12 % (ref 24–44)
MAGNESIUM SERPL-MCNC: 1.4 MG/DL (ref 1.6–2.6)
MCH RBC QN AUTO: 37.7 PG (ref 26–34)
MCHC RBC AUTO-ENTMCNC: 33.4 G/DL (ref 31–37)
MCV RBC AUTO: 112.8 FL (ref 80–100)
MONOCYTES NFR BLD: 0.16 K/UL (ref 0.1–1.3)
MONOCYTES NFR BLD: 4 % (ref 1–7)
MORPHOLOGY: ABNORMAL
NEUTROPHILS NFR BLD: 83 % (ref 36–66)
NEUTS SEG NFR BLD: 3.23 K/UL (ref 1.3–9.1)
PLATELET # BLD AUTO: 147 K/UL (ref 150–450)
PMV BLD AUTO: 7.7 FL (ref 6–12)
POTASSIUM SERPL-SCNC: 4.2 MMOL/L (ref 3.7–5.3)
PROT SERPL-MCNC: 7.6 G/DL (ref 6.4–8.3)
RBC # BLD AUTO: 3.13 M/UL (ref 4–5.2)
RETICS # AUTO: 0.03 M/UL (ref 0.02–0.1)
RETICS/RBC NFR AUTO: 1 % (ref 0.5–2)
SODIUM SERPL-SCNC: 137 MMOL/L (ref 135–144)
T4 FREE SERPL-MCNC: 1.5 NG/DL (ref 0.9–1.7)
TROPONIN I SERPL HS-MCNC: 59 NG/L (ref 0–14)
TROPONIN I SERPL HS-MCNC: 63 NG/L (ref 0–14)
TSH SERPL DL<=0.05 MIU/L-ACNC: 0.62 UIU/ML (ref 0.3–5)
WBC OTHER # BLD: 3.9 K/UL (ref 3.5–11)

## 2023-12-12 PROCEDURE — 2580000003 HC RX 258: Performed by: NURSE PRACTITIONER

## 2023-12-12 PROCEDURE — 6370000000 HC RX 637 (ALT 250 FOR IP): Performed by: EMERGENCY MEDICINE

## 2023-12-12 PROCEDURE — 84484 ASSAY OF TROPONIN QUANT: CPT

## 2023-12-12 PROCEDURE — 2060000000 HC ICU INTERMEDIATE R&B

## 2023-12-12 PROCEDURE — 96365 THER/PROPH/DIAG IV INF INIT: CPT

## 2023-12-12 PROCEDURE — 80053 COMPREHEN METABOLIC PANEL: CPT

## 2023-12-12 PROCEDURE — 82947 ASSAY GLUCOSE BLOOD QUANT: CPT

## 2023-12-12 PROCEDURE — 2580000003 HC RX 258: Performed by: EMERGENCY MEDICINE

## 2023-12-12 PROCEDURE — 2500000003 HC RX 250 WO HCPCS: Performed by: EMERGENCY MEDICINE

## 2023-12-12 PROCEDURE — 99285 EMERGENCY DEPT VISIT HI MDM: CPT

## 2023-12-12 PROCEDURE — 84443 ASSAY THYROID STIM HORMONE: CPT

## 2023-12-12 PROCEDURE — 93005 ELECTROCARDIOGRAM TRACING: CPT | Performed by: EMERGENCY MEDICINE

## 2023-12-12 PROCEDURE — 84439 ASSAY OF FREE THYROXINE: CPT

## 2023-12-12 PROCEDURE — 85045 AUTOMATED RETICULOCYTE COUNT: CPT

## 2023-12-12 PROCEDURE — 85025 COMPLETE CBC W/AUTO DIFF WBC: CPT

## 2023-12-12 PROCEDURE — 36415 COLL VENOUS BLD VENIPUNCTURE: CPT

## 2023-12-12 PROCEDURE — 83735 ASSAY OF MAGNESIUM: CPT

## 2023-12-12 PROCEDURE — 71045 X-RAY EXAM CHEST 1 VIEW: CPT

## 2023-12-12 PROCEDURE — 96361 HYDRATE IV INFUSION ADD-ON: CPT

## 2023-12-12 RX ORDER — SODIUM CHLORIDE 9 MG/ML
INJECTION, SOLUTION INTRAVENOUS CONTINUOUS
Status: DISCONTINUED | OUTPATIENT
Start: 2023-12-12 | End: 2023-12-12

## 2023-12-12 RX ORDER — HYDROCODONE BITARTRATE AND ACETAMINOPHEN 5; 325 MG/1; MG/1
1 TABLET ORAL ONCE
Status: COMPLETED | OUTPATIENT
Start: 2023-12-12 | End: 2023-12-12

## 2023-12-12 RX ORDER — LISINOPRIL 5 MG/1
5 TABLET ORAL DAILY
Status: DISCONTINUED | OUTPATIENT
Start: 2023-12-13 | End: 2023-12-15

## 2023-12-12 RX ORDER — ACETAMINOPHEN 325 MG/1
650 TABLET ORAL EVERY 6 HOURS PRN
Status: DISCONTINUED | OUTPATIENT
Start: 2023-12-12 | End: 2023-12-20 | Stop reason: HOSPADM

## 2023-12-12 RX ORDER — DEXTROSE MONOHYDRATE 100 MG/ML
INJECTION, SOLUTION INTRAVENOUS CONTINUOUS PRN
Status: DISCONTINUED | OUTPATIENT
Start: 2023-12-12 | End: 2023-12-19 | Stop reason: SDUPTHER

## 2023-12-12 RX ORDER — ACETAMINOPHEN 650 MG/1
650 SUPPOSITORY RECTAL EVERY 6 HOURS PRN
Status: DISCONTINUED | OUTPATIENT
Start: 2023-12-12 | End: 2023-12-20 | Stop reason: HOSPADM

## 2023-12-12 RX ORDER — METHADONE HYDROCHLORIDE 10 MG/1
10 TABLET ORAL 2 TIMES DAILY
Status: DISCONTINUED | OUTPATIENT
Start: 2023-12-12 | End: 2023-12-20 | Stop reason: HOSPADM

## 2023-12-12 RX ORDER — ONDANSETRON 4 MG/1
4 TABLET, ORALLY DISINTEGRATING ORAL EVERY 8 HOURS PRN
Status: DISCONTINUED | OUTPATIENT
Start: 2023-12-12 | End: 2023-12-20 | Stop reason: HOSPADM

## 2023-12-12 RX ORDER — BACLOFEN 10 MG/1
10 TABLET ORAL 3 TIMES DAILY PRN
Status: DISCONTINUED | OUTPATIENT
Start: 2023-12-12 | End: 2023-12-20 | Stop reason: HOSPADM

## 2023-12-12 RX ORDER — MAGNESIUM SULFATE HEPTAHYDRATE 40 MG/ML
2000 INJECTION, SOLUTION INTRAVENOUS PRN
Status: DISCONTINUED | OUTPATIENT
Start: 2023-12-12 | End: 2023-12-20 | Stop reason: HOSPADM

## 2023-12-12 RX ORDER — ONDANSETRON 2 MG/ML
4 INJECTION INTRAMUSCULAR; INTRAVENOUS EVERY 6 HOURS PRN
Status: DISCONTINUED | OUTPATIENT
Start: 2023-12-12 | End: 2023-12-20 | Stop reason: HOSPADM

## 2023-12-12 RX ORDER — MORPHINE SULFATE 4 MG/ML
4 INJECTION, SOLUTION INTRAMUSCULAR; INTRAVENOUS EVERY 4 HOURS PRN
Status: COMPLETED | OUTPATIENT
Start: 2023-12-12 | End: 2023-12-13

## 2023-12-12 RX ORDER — SODIUM CHLORIDE 9 MG/ML
INJECTION, SOLUTION INTRAVENOUS PRN
Status: DISCONTINUED | OUTPATIENT
Start: 2023-12-12 | End: 2023-12-20 | Stop reason: HOSPADM

## 2023-12-12 RX ORDER — ENOXAPARIN SODIUM 100 MG/ML
40 INJECTION SUBCUTANEOUS DAILY
Status: DISCONTINUED | OUTPATIENT
Start: 2023-12-13 | End: 2023-12-20 | Stop reason: HOSPADM

## 2023-12-12 RX ORDER — SODIUM CHLORIDE 0.9 % (FLUSH) 0.9 %
5-40 SYRINGE (ML) INJECTION PRN
Status: DISCONTINUED | OUTPATIENT
Start: 2023-12-12 | End: 2023-12-20 | Stop reason: HOSPADM

## 2023-12-12 RX ORDER — BACLOFEN 10 MG/1
10 TABLET ORAL ONCE
Status: DISCONTINUED | OUTPATIENT
Start: 2023-12-12 | End: 2023-12-20 | Stop reason: HOSPADM

## 2023-12-12 RX ORDER — BUSPIRONE HYDROCHLORIDE 15 MG/1
30 TABLET ORAL 2 TIMES DAILY
Status: DISCONTINUED | OUTPATIENT
Start: 2023-12-12 | End: 2023-12-20 | Stop reason: HOSPADM

## 2023-12-12 RX ORDER — POTASSIUM CHLORIDE 7.45 MG/ML
10 INJECTION INTRAVENOUS PRN
Status: DISCONTINUED | OUTPATIENT
Start: 2023-12-12 | End: 2023-12-20 | Stop reason: HOSPADM

## 2023-12-12 RX ORDER — ASPIRIN 81 MG/1
81 TABLET ORAL DAILY
Status: DISCONTINUED | OUTPATIENT
Start: 2023-12-13 | End: 2023-12-20 | Stop reason: HOSPADM

## 2023-12-12 RX ORDER — DEXTROSE AND SODIUM CHLORIDE 5; .9 G/100ML; G/100ML
INJECTION, SOLUTION INTRAVENOUS CONTINUOUS
Status: DISCONTINUED | OUTPATIENT
Start: 2023-12-12 | End: 2023-12-18

## 2023-12-12 RX ORDER — LANOLIN ALCOHOL/MO/W.PET/CERES
3 CREAM (GRAM) TOPICAL NIGHTLY
Status: DISCONTINUED | OUTPATIENT
Start: 2023-12-12 | End: 2023-12-20 | Stop reason: HOSPADM

## 2023-12-12 RX ORDER — POTASSIUM CHLORIDE 20 MEQ/1
40 TABLET, EXTENDED RELEASE ORAL PRN
Status: DISCONTINUED | OUTPATIENT
Start: 2023-12-12 | End: 2023-12-20 | Stop reason: HOSPADM

## 2023-12-12 RX ORDER — DILTIAZEM HYDROCHLORIDE 180 MG/1
180 CAPSULE, COATED, EXTENDED RELEASE ORAL DAILY
Status: DISCONTINUED | OUTPATIENT
Start: 2023-12-13 | End: 2023-12-18

## 2023-12-12 RX ORDER — SODIUM CHLORIDE 0.9 % (FLUSH) 0.9 %
5-40 SYRINGE (ML) INJECTION EVERY 12 HOURS SCHEDULED
Status: DISCONTINUED | OUTPATIENT
Start: 2023-12-12 | End: 2023-12-20 | Stop reason: HOSPADM

## 2023-12-12 RX ORDER — HYDROCODONE BITARTRATE AND ACETAMINOPHEN 5; 325 MG/1; MG/1
1 TABLET ORAL EVERY 8 HOURS PRN
Status: DISCONTINUED | OUTPATIENT
Start: 2023-12-12 | End: 2023-12-20 | Stop reason: HOSPADM

## 2023-12-12 RX ORDER — POLYETHYLENE GLYCOL 3350 17 G/17G
17 POWDER, FOR SOLUTION ORAL DAILY PRN
Status: DISCONTINUED | OUTPATIENT
Start: 2023-12-12 | End: 2023-12-20 | Stop reason: HOSPADM

## 2023-12-12 RX ORDER — PANTOPRAZOLE SODIUM 40 MG/1
40 TABLET, DELAYED RELEASE ORAL DAILY
Status: DISCONTINUED | OUTPATIENT
Start: 2023-12-13 | End: 2023-12-20 | Stop reason: HOSPADM

## 2023-12-12 RX ORDER — BACLOFEN 10 MG/1
10 TABLET ORAL 3 TIMES DAILY PRN
Status: ON HOLD | COMMUNITY
End: 2024-01-01 | Stop reason: HOSPADM

## 2023-12-12 RX ORDER — 0.9 % SODIUM CHLORIDE 0.9 %
1000 INTRAVENOUS SOLUTION INTRAVENOUS ONCE
Status: COMPLETED | OUTPATIENT
Start: 2023-12-12 | End: 2023-12-12

## 2023-12-12 RX ORDER — LEVOTHYROXINE SODIUM 0.1 MG/1
100 TABLET ORAL DAILY
Status: DISCONTINUED | OUTPATIENT
Start: 2023-12-13 | End: 2023-12-20 | Stop reason: HOSPADM

## 2023-12-12 RX ORDER — MAGNESIUM SULFATE HEPTAHYDRATE 40 MG/ML
2000 INJECTION, SOLUTION INTRAVENOUS ONCE
Status: COMPLETED | OUTPATIENT
Start: 2023-12-12 | End: 2023-12-12

## 2023-12-12 RX ADMIN — HYDROCODONE BITARTRATE AND ACETAMINOPHEN 1 TABLET: 5; 325 TABLET ORAL at 17:48

## 2023-12-12 RX ADMIN — SODIUM CHLORIDE: 9 INJECTION, SOLUTION INTRAVENOUS at 22:00

## 2023-12-12 RX ADMIN — DEXTROSE AND SODIUM CHLORIDE: 5; 900 INJECTION, SOLUTION INTRAVENOUS at 22:49

## 2023-12-12 RX ADMIN — MAGNESIUM SULFATE HEPTAHYDRATE 2000 MG: 40 INJECTION, SOLUTION INTRAVENOUS at 17:51

## 2023-12-12 RX ADMIN — SODIUM CHLORIDE 1000 ML: 9 INJECTION, SOLUTION INTRAVENOUS at 15:39

## 2023-12-12 RX ADMIN — SODIUM CHLORIDE, PRESERVATIVE FREE 10 ML: 5 INJECTION INTRAVENOUS at 22:01

## 2023-12-12 ASSESSMENT — PAIN SCALES - GENERAL
PAINLEVEL_OUTOF10: 8
PAINLEVEL_OUTOF10: 9

## 2023-12-12 ASSESSMENT — PAIN DESCRIPTION - DESCRIPTORS: DESCRIPTORS: ACHING

## 2023-12-12 ASSESSMENT — PAIN DESCRIPTION - ORIENTATION
ORIENTATION: RIGHT;LEFT
ORIENTATION: RIGHT;LEFT

## 2023-12-12 ASSESSMENT — PAIN DESCRIPTION - LOCATION
LOCATION: LEG
LOCATION: LEG

## 2023-12-12 NOTE — ED PROVIDER NOTES
[address] [facility] ED  Emergency Department Encounter  Emergency Medicine Resident     Pt Name:Rajani Mcarthur  MRN: [id number]  [facility] 1960  Date of evaluation: 12/12/23  PCP:  Jed Uribe MD  Note Started: 3:08 PM EST      CHIEF COMPLAINT       Chief Complaint   Patient presents with    Fatigue       HISTORY OF PRESENT ILLNESS  (Location/Symptom, Timing/Onset, Context/Setting, Quality, Duration, Modifying Factors, Severity.)      Petar Ferreira is a 59 y.o. female who presents with depression, decreased appetite. Daughter brought patient in due to being concerned about her having depression. Per daughter, patient lives with her  however her  works throughout the weekend is not home often therefore patient is frequently home alone. Daughter states that the patient is not eating, drinking, taking her medications. Daughter states that she appears to have no desire to do anything and is concerned that she is depressed. Patient did state that she does feel depressed. Patient states that she has no interest in anything anymore, has no appetite, states that food is just not appealing to her. Patient states that she does not remember to take her medication because she often sleeps. Patient and daughter states this has been going on for a couple of months now however it is getting worse therefore she came to the emergency department to be evaluated. PAST MEDICAL / SURGICAL / SOCIAL / FAMILY HISTORY      has a past medical history of Anxiety, Arthritis, At maximum risk for fall, Breast cancer ([address]), Cancer ([address]), Cauda equina syndrome (HCC), Cervical stenosis of spine, GERD (gastroesophageal reflux disease), History of stomach ulcers, History of therapeutic radiation, Hx antineoplastic chemo, Hypertension, Hypothyroidism, Lumbar neuritis, Post laminectomy syndrome, Spinal deformity, Thyroid disease, Uses wheelchair, Wears dentures, and Wellness examination.      has a past surgical that the patient is not eating, drinking, taking her medications. Daughter states that she appears to have no desire to do anything and is concerned that she is depressed. Patient did state that she does feel depressed. Patient states that she has no interest in anything anymore, has no appetite, states that food is just not appealing to her. Patient states that she does not remember to take her medication because she often sleeps. Patient and daughter states this has been going on for a couple of months now however it is getting worse therefore she came to the emergency department to be evaluated. On evaluation, patient is well-appearing, nontoxic, afebrile. Lung sounds are clear and equal bilaterally, abdomen soft nontender. Discussed with patient that we will obtain a cardiac workup as well as check her thyroid as she does have a history of hypothyroidism. Discussed that we would like to rule out a medical emergency as a cause for her current presentation. Will obtain EKG, chest x-ray, CBC, CMP, magnesium, troponin x 2, TSH, and T4. Patient is currently tachycardic on telemetry at a rate of 120 with PACs. Patient does appear dehydrated and has not had any hydration. Will give 1 L normal saline fluid bolus. Discussed with patient that if labs and imaging is unremarkable, that we would like to admit her to the hospital for this tachycardia, to restart her medications, and to see psychiatry. Patient stated that she is agreeable with this plan. Amount and/or Complexity of Data Reviewed  Labs: ordered. Decision-making details documented in ED Course. Radiology: ordered. ECG/medicine tests: ordered. Risk  Prescription drug management. Decision regarding hospitalization.       EKG  EKG Interpretation    Interpreted by me    Rhythm: Sinus tachycardia with PACs  Rate: normal  Axis: normal  Ectopy: PACs  Conduction: normal  ST Segments: no acute change  T Waves: no acute change  Q Waves:

## 2023-12-12 NOTE — PROGRESS NOTES
Pharmacy Medication History Note      List of current medications patient is taking is complete. Source of information: patient, dispense report, [facility] (Alo Saturnino), OARRS    Changes made to medication list:  Medications flagged for removal (include reason, ex. noncompliance):  None     Medications removed (include reason, ex. therapy complete or physician discontinued):  None     Medications added/doses adjusted:  Baclofen decreased to 10 mg three times daily as needed  Norco decreased to 5-325 mg taking 1 tab three times daily as needed    Other notes (ex. Recent course of antibiotics, Coumadin dosing):  Per OARRS, last fill of Norco was on 11/3/23 with quantity 90 for 30 days. Per OARRS, last fill of methadone was on 11/30/23 with quantity 60 for 30 days. Patient is taking methadone for pain management. Patient reports use of buspirone however last fill was in April 2023 for 30 days. Medication history completed by Roni Beverly, PharmD Candidate 2025 under my direct supervision. Denies use of other OTC or herbal medications.       Allergies clarified    Medication list provided to the patient: no   Medication education provided to the patient: none      Electronically signed by Kameron Martin, [address] [place] [address] on 12/12/2023 at 4:18 PM

## 2023-12-12 NOTE — ED NOTES
Patient's daughter at bedside, patient has not been eating/drinking well. Patient's daughter feels like patient is depressed and patient agrees as well. Patient hasn't taken her blood pressure medications in 3 days.      Jose Dupont RN  12/12/23 1757

## 2023-12-13 ENCOUNTER — APPOINTMENT (OUTPATIENT)
Age: 63
DRG: 311 | End: 2023-12-13
Attending: INTERNAL MEDICINE
Payer: MEDICARE

## 2023-12-13 ENCOUNTER — APPOINTMENT (OUTPATIENT)
Dept: CT IMAGING | Age: 63
DRG: 311 | End: 2023-12-13
Payer: MEDICARE

## 2023-12-13 PROBLEM — F32.A DEPRESSION: Status: ACTIVE | Noted: 2023-12-13

## 2023-12-13 PROBLEM — I21.4 NSTEMI (NON-ST ELEVATED MYOCARDIAL INFARCTION) (HCC): Status: ACTIVE | Noted: 2023-12-13

## 2023-12-13 PROBLEM — R63.0 DECREASED APPETITE: Status: ACTIVE | Noted: 2023-12-13

## 2023-12-13 PROBLEM — R53.83 FATIGUE: Status: ACTIVE | Noted: 2023-12-13

## 2023-12-13 LAB
ABSOLUTE BANDS #: 0.05 K/UL (ref 0–1)
AMMONIA PLAS-SCNC: 46 UMOL/L (ref 11–51)
ANION GAP SERPL CALCULATED.3IONS-SCNC: 18 MMOL/L (ref 9–17)
BANDS: 1 % (ref 0–10)
BASOPHILS # BLD: 0 K/UL (ref 0–0.2)
BASOPHILS NFR BLD: 0 % (ref 0–2)
BUN SERPL-MCNC: 8 MG/DL (ref 8–23)
CALCIUM SERPL-MCNC: 7.9 MG/DL (ref 8.6–10.4)
CHLORIDE SERPL-SCNC: 90 MMOL/L (ref 98–107)
CO2 SERPL-SCNC: 24 MMOL/L (ref 20–31)
CREAT SERPL-MCNC: 0.5 MG/DL (ref 0.5–0.9)
ECHO AO ROOT DIAM: 2.5 CM
ECHO AO ROOT INDEX: 1.67 CM/M2
ECHO AV AREA PEAK VELOCITY: 1.7 CM2
ECHO AV AREA VTI: 2 CM2
ECHO AV AREA/BSA PEAK VELOCITY: 1.1 CM2/M2
ECHO AV AREA/BSA VTI: 1.3 CM2/M2
ECHO AV MEAN GRADIENT: 2 MMHG
ECHO AV MEAN VELOCITY: 0.7 M/S
ECHO AV PEAK GRADIENT: 4 MMHG
ECHO AV PEAK VELOCITY: 1 M/S
ECHO AV VELOCITY RATIO: 0.7
ECHO AV VTI: 19 CM
ECHO BSA: 1.51 M2
ECHO EST RA PRESSURE: 8 MMHG
ECHO LA AREA 4C: 9.2 CM2
ECHO LA DIAMETER INDEX: 1.4 CM/M2
ECHO LA DIAMETER: 2.1 CM
ECHO LA MAJOR AXIS: 3.5 CM
ECHO LA TO AORTIC ROOT RATIO: 0.84
ECHO LA VOL MOD A4C: 20 ML (ref 22–52)
ECHO LA VOLUME INDEX MOD A4C: 13 ML/M2 (ref 16–34)
ECHO LV EDV A2C: 41 ML
ECHO LV EDV A4C: 53 ML
ECHO LV EDV INDEX A4C: 35 ML/M2
ECHO LV EDV NDEX A2C: 27 ML/M2
ECHO LV EJECTION FRACTION A2C: 59 %
ECHO LV EJECTION FRACTION A4C: 42 %
ECHO LV EJECTION FRACTION BIPLANE: 50 % (ref 55–100)
ECHO LV ESV A2C: 17 ML
ECHO LV ESV A4C: 31 ML
ECHO LV ESV INDEX A2C: 11 ML/M2
ECHO LV ESV INDEX A4C: 21 ML/M2
ECHO LV FRACTIONAL SHORTENING: 21 % (ref 28–44)
ECHO LV INTERNAL DIMENSION DIASTOLE INDEX: 1.93 CM/M2
ECHO LV INTERNAL DIMENSION DIASTOLIC: 2.9 CM (ref 3.9–5.3)
ECHO LV INTERNAL DIMENSION SYSTOLIC INDEX: 1.53 CM/M2
ECHO LV INTERNAL DIMENSION SYSTOLIC: 2.3 CM
ECHO LV IVSD: 1 CM (ref 0.6–0.9)
ECHO LV MASS 2D: 78.2 G (ref 67–162)
ECHO LV MASS INDEX 2D: 52.1 G/M2 (ref 43–95)
ECHO LV POSTERIOR WALL DIASTOLIC: 1 CM (ref 0.6–0.9)
ECHO LV RELATIVE WALL THICKNESS RATIO: 0.69
ECHO LVOT AREA: 2.3 CM2
ECHO LVOT AV VTI INDEX: 0.88
ECHO LVOT DIAM: 1.7 CM
ECHO LVOT MEAN GRADIENT: 1 MMHG
ECHO LVOT PEAK GRADIENT: 2 MMHG
ECHO LVOT PEAK VELOCITY: 0.7 M/S
ECHO LVOT STROKE VOLUME INDEX: 25.3 ML/M2
ECHO LVOT SV: 37.9 ML
ECHO LVOT VTI: 16.7 CM
ECHO MV AREA VTI: 1.4 CM2
ECHO MV E DECELERATION TIME (DT): 113 MS
ECHO MV E VELOCITY: 0.97 M/S
ECHO MV LVOT VTI INDEX: 1.57
ECHO MV MAX VELOCITY: 1.1 M/S
ECHO MV MEAN GRADIENT: 1 MMHG
ECHO MV MEAN VELOCITY: 0.6 M/S
ECHO MV PEAK GRADIENT: 5 MMHG
ECHO MV VTI: 26.2 CM
ECHO RA AREA 4C: 8.9 CM2
ECHO RA END SYSTOLIC VOLUME APICAL 4 CHAMBER INDEX BSA: 13 ML/M2
ECHO RA VOLUME: 20 ML
ECHO RIGHT VENTRICULAR SYSTOLIC PRESSURE (RVSP): 32 MMHG
ECHO RV TAPSE: 1.7 CM (ref 1.7–?)
ECHO TV REGURGITANT MAX VELOCITY: 2.44 M/S
ECHO TV REGURGITANT PEAK GRADIENT: 24 MMHG
EKG ATRIAL RATE: 124 BPM
EKG P AXIS: 60 DEGREES
EKG P-R INTERVAL: 134 MS
EKG Q-T INTERVAL: 344 MS
EKG QRS DURATION: 80 MS
EKG QTC CALCULATION (BAZETT): 494 MS
EKG R AXIS: 30 DEGREES
EKG T AXIS: 36 DEGREES
EKG VENTRICULAR RATE: 124 BPM
EOSINOPHIL # BLD: 0 K/UL (ref 0–0.4)
EOSINOPHILS RELATIVE PERCENT: 0 % (ref 0–4)
ERYTHROCYTE [DISTWIDTH] IN BLOOD BY AUTOMATED COUNT: 14.6 % (ref 11.5–14.9)
GFR SERPL CREATININE-BSD FRML MDRD: >60 ML/MIN/1.73M2
GLUCOSE BLD-MCNC: 117 MG/DL (ref 65–105)
GLUCOSE BLD-MCNC: 119 MG/DL (ref 65–105)
GLUCOSE BLD-MCNC: 133 MG/DL (ref 65–105)
GLUCOSE BLD-MCNC: 222 MG/DL (ref 65–105)
GLUCOSE SERPL-MCNC: 128 MG/DL (ref 70–99)
HCT VFR BLD AUTO: 33.2 % (ref 36–46)
HGB BLD-MCNC: 11.1 G/DL (ref 12–16)
LYMPHOCYTES NFR BLD: 0.83 K/UL (ref 1–4.8)
LYMPHOCYTES RELATIVE PERCENT: 18 % (ref 24–44)
MAGNESIUM SERPL-MCNC: 1.5 MG/DL (ref 1.6–2.6)
MCH RBC QN AUTO: 37.9 PG (ref 26–34)
MCHC RBC AUTO-ENTMCNC: 33.5 G/DL (ref 31–37)
MCV RBC AUTO: 113.2 FL (ref 80–100)
MONOCYTES NFR BLD: 0.18 K/UL (ref 0.1–1.3)
MONOCYTES NFR BLD: 4 % (ref 1–7)
MORPHOLOGY: ABNORMAL
NEUTROPHILS NFR BLD: 77 % (ref 36–66)
NEUTS SEG NFR BLD: 3.54 K/UL (ref 1.3–9.1)
PATH REV BLD -IMP: NORMAL
PLATELET # BLD AUTO: 125 K/UL (ref 150–450)
PMV BLD AUTO: 8.1 FL (ref 6–12)
POTASSIUM SERPL-SCNC: 3.2 MMOL/L (ref 3.7–5.3)
RBC # BLD AUTO: 2.93 M/UL (ref 4–5.2)
SODIUM SERPL-SCNC: 132 MMOL/L (ref 135–144)
SURGICAL PATHOLOGY REPORT: NORMAL
TROPONIN I SERPL HS-MCNC: 51 NG/L (ref 0–14)
WBC OTHER # BLD: 4.6 K/UL (ref 3.5–11)

## 2023-12-13 PROCEDURE — 92610 EVALUATE SWALLOWING FUNCTION: CPT

## 2023-12-13 PROCEDURE — 83735 ASSAY OF MAGNESIUM: CPT

## 2023-12-13 PROCEDURE — 6370000000 HC RX 637 (ALT 250 FOR IP): Performed by: INTERNAL MEDICINE

## 2023-12-13 PROCEDURE — 82140 ASSAY OF AMMONIA: CPT

## 2023-12-13 PROCEDURE — 84484 ASSAY OF TROPONIN QUANT: CPT

## 2023-12-13 PROCEDURE — 97535 SELF CARE MNGMENT TRAINING: CPT

## 2023-12-13 PROCEDURE — 2060000000 HC ICU INTERMEDIATE R&B

## 2023-12-13 PROCEDURE — 80048 BASIC METABOLIC PNL TOTAL CA: CPT

## 2023-12-13 PROCEDURE — 6360000002 HC RX W HCPCS: Performed by: NURSE PRACTITIONER

## 2023-12-13 PROCEDURE — 97530 THERAPEUTIC ACTIVITIES: CPT

## 2023-12-13 PROCEDURE — 36415 COLL VENOUS BLD VENIPUNCTURE: CPT

## 2023-12-13 PROCEDURE — 99223 1ST HOSP IP/OBS HIGH 75: CPT | Performed by: INTERNAL MEDICINE

## 2023-12-13 PROCEDURE — 6370000000 HC RX 637 (ALT 250 FOR IP): Performed by: PSYCHIATRY & NEUROLOGY

## 2023-12-13 PROCEDURE — 97162 PT EVAL MOD COMPLEX 30 MIN: CPT

## 2023-12-13 PROCEDURE — 74176 CT ABD & PELVIS W/O CONTRAST: CPT

## 2023-12-13 PROCEDURE — 2580000003 HC RX 258: Performed by: NURSE PRACTITIONER

## 2023-12-13 PROCEDURE — 97167 OT EVAL HIGH COMPLEX 60 MIN: CPT

## 2023-12-13 PROCEDURE — 82947 ASSAY GLUCOSE BLOOD QUANT: CPT

## 2023-12-13 PROCEDURE — 2500000003 HC RX 250 WO HCPCS: Performed by: INTERNAL MEDICINE

## 2023-12-13 PROCEDURE — 93306 TTE W/DOPPLER COMPLETE: CPT

## 2023-12-13 PROCEDURE — 6370000000 HC RX 637 (ALT 250 FOR IP): Performed by: NURSE PRACTITIONER

## 2023-12-13 PROCEDURE — 6360000002 HC RX W HCPCS: Performed by: INTERNAL MEDICINE

## 2023-12-13 PROCEDURE — 93010 ELECTROCARDIOGRAM REPORT: CPT | Performed by: INTERNAL MEDICINE

## 2023-12-13 PROCEDURE — 85027 COMPLETE CBC AUTOMATED: CPT

## 2023-12-13 RX ORDER — POTASSIUM CHLORIDE 7.45 MG/ML
10 INJECTION INTRAVENOUS
Status: DISPENSED | OUTPATIENT
Start: 2023-12-13 | End: 2023-12-13

## 2023-12-13 RX ORDER — MAGNESIUM SULFATE HEPTAHYDRATE 40 MG/ML
2000 INJECTION, SOLUTION INTRAVENOUS PRN
Status: DISCONTINUED | OUTPATIENT
Start: 2023-12-13 | End: 2023-12-20 | Stop reason: HOSPADM

## 2023-12-13 RX ORDER — CARVEDILOL 3.12 MG/1
3.12 TABLET ORAL 2 TIMES DAILY WITH MEALS
Status: DISCONTINUED | OUTPATIENT
Start: 2023-12-13 | End: 2023-12-15

## 2023-12-13 RX ADMIN — BUSPIRONE HYDROCHLORIDE 30 MG: 15 TABLET ORAL at 20:50

## 2023-12-13 RX ADMIN — POTASSIUM CHLORIDE 10 MEQ: 7.46 INJECTION, SOLUTION INTRAVENOUS at 13:02

## 2023-12-13 RX ADMIN — DEXTROSE AND SODIUM CHLORIDE: 5; 900 INJECTION, SOLUTION INTRAVENOUS at 20:58

## 2023-12-13 RX ADMIN — MORPHINE SULFATE 4 MG: 4 INJECTION, SOLUTION INTRAMUSCULAR; INTRAVENOUS at 08:09

## 2023-12-13 RX ADMIN — MAGNESIUM SULFATE HEPTAHYDRATE 2000 MG: 40 INJECTION, SOLUTION INTRAVENOUS at 10:30

## 2023-12-13 RX ADMIN — SODIUM CHLORIDE, PRESERVATIVE FREE 10 ML: 5 INJECTION INTRAVENOUS at 08:38

## 2023-12-13 RX ADMIN — HYDROCODONE BITARTRATE AND ACETAMINOPHEN 1 TABLET: 5; 325 TABLET ORAL at 17:51

## 2023-12-13 RX ADMIN — POTASSIUM CHLORIDE 10 MEQ: 7.46 INJECTION, SOLUTION INTRAVENOUS at 15:58

## 2023-12-13 RX ADMIN — POTASSIUM CHLORIDE 10 MEQ: 7.46 INJECTION, SOLUTION INTRAVENOUS at 14:13

## 2023-12-13 RX ADMIN — MORPHINE SULFATE 4 MG: 4 INJECTION, SOLUTION INTRAMUSCULAR; INTRAVENOUS at 12:54

## 2023-12-13 RX ADMIN — ASPIRIN 81 MG: 81 TABLET, COATED ORAL at 13:40

## 2023-12-13 RX ADMIN — PANTOPRAZOLE SODIUM 40 MG: 40 TABLET, DELAYED RELEASE ORAL at 13:40

## 2023-12-13 RX ADMIN — ENOXAPARIN SODIUM 40 MG: 100 INJECTION SUBCUTANEOUS at 08:10

## 2023-12-13 RX ADMIN — LISINOPRIL 5 MG: 5 TABLET ORAL at 13:40

## 2023-12-13 RX ADMIN — MORPHINE SULFATE 4 MG: 4 INJECTION, SOLUTION INTRAMUSCULAR; INTRAVENOUS at 02:31

## 2023-12-13 RX ADMIN — CARVEDILOL 3.12 MG: 3.12 TABLET, FILM COATED ORAL at 17:41

## 2023-12-13 RX ADMIN — METHADONE HYDROCHLORIDE 10 MG: 10 TABLET ORAL at 20:50

## 2023-12-13 RX ADMIN — SERTRALINE HYDROCHLORIDE 25 MG: 50 TABLET ORAL at 13:40

## 2023-12-13 RX ADMIN — DILTIAZEM HYDROCHLORIDE 180 MG: 180 CAPSULE, COATED, EXTENDED RELEASE ORAL at 13:40

## 2023-12-13 ASSESSMENT — PAIN SCALES - GENERAL
PAINLEVEL_OUTOF10: 6
PAINLEVEL_OUTOF10: 4
PAINLEVEL_OUTOF10: 8
PAINLEVEL_OUTOF10: 8
PAINLEVEL_OUTOF10: 5
PAINLEVEL_OUTOF10: 8
PAINLEVEL_OUTOF10: 9
PAINLEVEL_OUTOF10: 8

## 2023-12-13 ASSESSMENT — PAIN DESCRIPTION - DESCRIPTORS
DESCRIPTORS: DISCOMFORT
DESCRIPTORS: ACHING

## 2023-12-13 ASSESSMENT — PAIN DESCRIPTION - DIRECTION: RADIATING_TOWARDS: '

## 2023-12-13 ASSESSMENT — PAIN DESCRIPTION - LOCATION
LOCATION: BACK;LEG;NECK
LOCATION: BACK
LOCATION: BACK;LEG

## 2023-12-13 ASSESSMENT — PAIN DESCRIPTION - ORIENTATION
ORIENTATION: MID
ORIENTATION: MID

## 2023-12-13 ASSESSMENT — PAIN - FUNCTIONAL ASSESSMENT: PAIN_FUNCTIONAL_ASSESSMENT: PREVENTS OR INTERFERES SOME ACTIVE ACTIVITIES AND ADLS

## 2023-12-13 NOTE — PROGRESS NOTES
SLP ALL NOTES  [facility]  Speech Language Pathology    Date: 12/13/2023  Patient Name: Maryann Sol  YOB: 1960   AGE: 59 y.o. MRN: [id number]        Patient Not Available for Speech Therapy     Due to:  [] Testing  [] Hemodialysis  [] Cancelled by RN  [] Surgery   [] Intubation/Sedation/Pain Medication  [] Medical instability  [x] Other:  2081- attempted bedside swallow assessment, transport up in pt. Room to take her for ABD CT. Will reattempt later today. Pt. Has order for MBS, per radiology, there is no radiologist available today (12/13), unable to accommodate on this date. ST to arrange for tomorrow, 12/14 as appropriate/needed. Angelita [email or web address].MED/[email or web address]

## 2023-12-13 NOTE — PROGRESS NOTES
Comprehensive Nutrition Assessment    Type and Reason for Visit:  Positive Nutrition Screen (wt loss, poor appetite)    Nutrition Recommendations/Plan:   Will provide Minced/Moist det with Nectar Thick liquids  Will add Magic cup twice daily     Malnutrition Assessment:  Malnutrition Status: At risk for malnutrition (Comment) (12/13/23 6503)    Context:  Chronic Illness     Findings of the 6 clinical characteristics of malnutrition:  Energy Intake:  Mild decrease in energy intake (Comment)  Weight Loss:   (11% wt loss over 9 months)     Body Fat Loss:  Unable to assess     Muscle Mass Loss:  Unable to assess    Fluid Accumulation:  No significant fluid accumulation     Strength:  Not Performed    Nutrition Assessment:    Pt was admitted due to Fatigue/Poor appetite. Physician writes that pt has difficulty swallowing and Modified Barium Swallow is ordered for 12/14. Pt is anxious to eat and states she has had no food x 4 days prior to admit. Pt has just been advanced to Minced/Moist diet with Nectar thick liquids    Nutrition Related Findings:    no edema, Labs: Reviewed, Meds: Synthroid, PMH: Breast Ca, GERD, dentures Wound Type: None       Current Nutrition Intake & Therapies:    Average Meal Intake: NPO     ADULT DIET; Dysphagia - Minced and Moist; Mildly Thick (Nectar)    Anthropometric Measures:  Height: 154.9 cm (5' 1\")  Ideal Body Weight (IBW): 105 lbs (48 kg)    Admission Body Weight: 52.6 kg (116 lb)  Current Body Weight: 55 kg (121 lb 4.1 oz) (obtained by RD),   IBW. Weight Source: Bed Scale  Current BMI (kg/m2): 22.9  Usual Body Weight: 62.1 kg (137 lb) (3/23)  % Weight Change (Calculated): -11.5                    BMI Categories: Normal Weight (BMI 18.5-24. 9)    Estimated Daily Nutrient Needs:  Energy Requirements Based On: Formula  Weight Used for Energy Requirements: Current  Energy (kcal/day): [place] x 1.3= 1400 kcal  Weight Used for Protein Requirements: Current  Protein (g/day): 1.5g/kg= 80 g protein  Method Used for Fluid Requirements: 1 ml/kcal    Nutrition Diagnosis:   Inadequate protein intake related to  (poor appetite) as evidenced by poor intake prior to admission    Nutrition Interventions:   Food and/or Nutrient Delivery: Start Oral Diet, Start Oral Nutrition Supplement  Nutrition Education/Counseling: No recommendation at this time  Coordination of Nutrition Care: Continue to monitor while inpatient       Goals:     Goals: PO intake 50% or greater       Nutrition Monitoring and Evaluation:      Food/Nutrient Intake Outcomes: Diet Advancement/Tolerance, Food and Nutrient Intake, Supplement Intake  Physical Signs/Symptoms Outcomes: Biochemical Data, Nausea or Vomiting, Hemodynamic Status, Skin, Weight    Discharge Planning:     Too soon to determine     Rena Ingram, FLOR  Contact: [phone]

## 2023-12-13 NOTE — PLAN OF CARE
Problem: Safety - Adult  Goal: Free from fall injury  Outcome: Progressing  Note: No falls noted this shift. Patient ambulates with x1 staff assistance without difficulty. Bed kept in low position. Safe environment maintained. Bedside table & call light in reach. Uses call light appropriately when needing assistance. Problem: Pain  Goal: Verbalizes/displays adequate comfort level or baseline comfort level  Outcome: Progressing  Note: Pt medicated with pain medication prn. Assessed all pain characteristics including level, type, location, frequency, and onset. Non-pharmacologic interventions offered to pt as well. Pt states pain is tolerable at this time. Problem: Skin/Tissue Integrity  Goal: Absence of new skin breakdown  Description: 1. Monitor for areas of redness and/or skin breakdown  2. Assess vascular access sites hourly  3. Every 4-6 hours minimum:  Change oxygen saturation probe site  4. Every 4-6 hours:  If on nasal continuous positive airway pressure, respiratory therapy assess nares and determine need for appliance change or resting period.   Outcome: Progressing     Problem: Discharge Planning  Goal: Discharge to home or other facility with appropriate resources  Outcome: Progressing

## 2023-12-13 NOTE — CONSULTS
[facility]  In Patient Cardiology Consult             Date:   12/13/2023  Patient name: Ras Smith  Date of admission:  12/12/2023  2:54 PM  MRN:   [id number]  YOB: 1960    Reason for Admission: Fatigue, elevated troponin    CHIEF COMPLAINT: Fatigue    History Obtained From: The patient and chart    HISTORY OF PRESENT ILLNESS:    This is a 42-year-old female. She comes in complaining of fatigue. She has been having decreased appetite. Decreased energy. Difficulty swallowing. Decreased motivation. Cardiology was consulted due to elevated troponin. She denies any chest pains.     Past Medical History:    Past Medical History:   Diagnosis Date    Anxiety     Arthritis     At maximum risk for fall 12/29/2021    caudal equina syndrome and cervical stenosis    Breast cancer ([address])     Cancer (HCC)     right breast    Cauda equina syndrome (HCC) 12/29/2021    neuropathy, mobility difficulty, incontinance    Cervical stenosis of spine 12/29/2021    GERD (gastroesophageal reflux disease)     History of stomach ulcers     History of therapeutic radiation     Hx antineoplastic chemo     Hypertension     Dr. Kyler Farias    Hypothyroidism     Lumbar neuritis     Post laminectomy syndrome     Spinal deformity 11/2021    cervical and lumbar    Thyroid disease     Uses wheelchair     Wears dentures     Wellness examination     Dr. Kyler Farias         Past Surgical History:    Past Surgical History:   Procedure Laterality Date    BACK SURGERY      lower back x 3, cervical- x 1: C4- C6, 11/4/2014     BREAST SURGERY Right     mastectomy with reconstruction    CERVICAL FUSION N/A 4/12/2022    C5 CORPECTOMY, USE OF INTRAOPERATIVE CERVICAL TRACTION performed by Kash Wen DO at [address] [facility] [address] 4/12/2022    POSTERIOR FIXATION C2-C7 performed by Kash Wen DO at [address] [place] [address] Road  about 2018    HERNIA REPAIR Bilateral     inguinal    HYSTERECTOMY, TOTAL ABDOMINAL (CERVIX Session: 10 min   Stress: No Stress Concern Present (5/3/2022)    [facility]     Feeling of Stress : Only a little   Social Connections: Socially Isolated (5/3/2022)    Social Connection and Isolation Panel [NHANES]     Frequency of Communication with Friends and Family: Three times a week     Frequency of Social Gatherings with Friends and Family: Twice a week     Attends Sikh Services: Never     Active Member of Clubs or Organizations: No     Attends Club or Organization Meetings: Never     Marital Status:    Housing Stability: Low Risk  (12/12/2023)    Housing Stability Vital Sign     Unable to Pay for Housing in the Last Year: No     Number of Places Lived in the Last Year: 1     Unstable Housing in the Last Year: No        Family History:   Family History   Problem Relation Age of Onset    Hypertension Mother     Heart Disease Mother     Cancer Mother        REVIEW OF SYSTEMS:    Constitutional: there has been no unanticipated weight loss. There's been No change in energy level, No change in activity level. Eyes: No visual changes or diplopia. No scleral icterus. ENT: No Headaches, hearing loss or vertigo. No mouth sores or sore throat. Cardiovascular: As HPI  Respiratory: As HPI  Gastrointestinal: No abdominal pain, appetite loss, blood in stools. No change in bowel or bladder habits. Genitourinary: No dysuria, trouble voiding, or hematuria. Musculoskeletal:  No gait disturbance, No weakness or joint complaints. Integumentary: No rash or pruritis. Neurological: No headache, diplopia, change in muscle strength, numbness or tingling. No change in gait, balance, coordination, mood, affect, memory, mentation, behavior. Psychiatric: No anxiety, or depression. Endocrine: No temperature intolerance. No excessive thirst, fluid intake, or urination. No tremor.   Hematologic/Lymphatic: No abnormal bruising or bleeding, blood clots or swollen lymph nodes.  Allergic/Immunologic: No nasal congestion or hives.    PHYSICAL EXAM:    Physical Examination:    BP (!) 150/92   Pulse (!) 111   Temp 98 °F (36.7 °C) (Oral)   Resp 16   Ht 1.549 m (5' 1\")   Wt 53 kg (116 lb 13.5 oz)   SpO2 97%   BMI 22.08 kg/m²    Constitutional and General Appearance: alert, cooperative, no distress and appears stated age  HEENT: PERRL, no cervical lymphadenopathy. No masses palpable. Normal oral mucosa  Respiratory:  Normal excursion and expansion without use of accessory muscles  Resp Auscultation: Good respiratory effort. No for increased work of breathing. On auscultation: clear  Cardiovascular:  Heart tones are crisp and normal. regular S1 and S2. Murmurs: none  Jugular venous pulsation Normal  Abdomen:  No masses or tenderness  Bowel sounds present  Extremities:   No Cyanosis or Clubbing   Lower extremity edema: none   Skin: Warm and dry  Neurological:  Alert and oriented.  Moves all extremities well  No abnormalities of mood, affect, memory, mentation, or behavior are noted    DATA:    Diagnostics:      EKG:   Results for orders placed or performed during the hospital encounter of 12/12/23   EKG 12 Lead   Result Value Ref Range    Ventricular Rate 124 BPM    Atrial Rate 124 BPM    P-R Interval 134 ms    QRS Duration 80 ms    Q-T Interval 344 ms    QTc Calculation (Bazett) 494 ms    P Axis 60 degrees    R Axis 30 degrees    T Axis 36 degrees    Narrative    Sinus tachycardia with Premature atrial complexes  Nonspecific ST and T wave abnormality  Abnormal ECG  No previous ECGs available         Labs:     CBC:   Recent Labs     12/12/23  1530 12/13/23  0540   WBC 3.9 4.6   HGB 11.8* 11.1*   HCT 35.3* 33.2*   * 125*     BMP:   Recent Labs     12/12/23  1530 12/13/23  0540    132*   K 4.2 3.2*   CO2 23 24   BUN 15 8   CREATININE 0.6 0.5   LABGLOM >60 >60   GLUCOSE 67* 128*     BNP: No results for input(s): \"BNP\" in the last 72 hours.  PT/INR: No

## 2023-12-13 NOTE — ED NOTES
Pt up to bedside commode with assistance of daughter. After pt got back on the bed, pt had 1 episode of clear emesis. Pt was also incontinent of urine when she vomited. Pt changed into a gown and linen changed. Pt reports she is not feeling nauseous anymore.       Ambika Stephens RN  12/12/23 2041

## 2023-12-13 NOTE — PROGRESS NOTES
Physical Therapy        Physical Therapy Cancel Note      DATE: 2023    NAME: Antwan Chew  MRN: [id number]   : 1960      Patient not seen this date for Physical Therapy due to:    2023 at 12- Pt being taken for testing by radiology transporter. Will attempt to see later today.        Electronically signed by Massiel Jordan PT on 2023 at 8:41 AM

## 2023-12-13 NOTE — PROGRESS NOTES
[address] [facility]   OCCUPATIONAL THERAPY MISSED TREATMENT NOTE   INPATIENT   Date: 23  Patient Name: Kyara Wilson       Room: 0906/0545-19  MRN: [id number]   Account #: [de-identified]    : 1960  (65 y.o.)  Gender: female      REASON FOR MISSED TREATMENT:  Patient at testing and/or off the floor   -   transportation aides present in room taking pt to CT scan. OT will re-attempt PM as time permits.     3904        Electronically signed by СЕРГЕЙ Yoo on 23 at 8:38 AM EST

## 2023-12-13 NOTE — PROGRESS NOTES
SC visit with patient and  grandchildren; welcomed prayer     12/13/23 9270   Encounter Summary   Encounter Overview/Reason  Spiritual/Emotional Needs   Service Provided For: Patient and family together   Referral/Consult From: [address] Family members; Children   Last Encounter  12/13/23   Complexity of Encounter Moderate   Spiritual/Emotional needs   Type Spiritual Support   Assessment/Intervention/Outcome   Assessment Coping; Hopeful;Powerlessness   Intervention Explored/Affirmed feelings, thoughts, concerns;Prayer (assurance of)/[place];Sustaining Presence/Ministry of presence   Outcome Engaged in conversation;Expressed feelings, needs, and concerns;Expressed Gratitude;Receptive

## 2023-12-13 NOTE — H&P
[facility]   IN-PATIENT SERVICE   [facility]    HISTORY AND PHYSICAL EXAMINATION            Date:   12/13/2023  Patient name:  Rajani Iglesias  Date of admission:  12/12/2023  2:54 PM  MRN:   [id number]  Account:  [id number]  YOB: 1960  PCP:    Giovanna Brasher MD  Room:   [address] [place] [address]  Code Status:    Full Code    Chief Complaint:     Chief Complaint   Patient presents with    Fatigue       History Obtained From:     patient, electronic medical record    History of Present Illness:     The patient is a 63 y.o.  Non- / non  female who presents with Fatigue   and she is admitted to the hospital for the management of fatigue, elevated troponin  Rajani is a 63-year-old ill-appearing, malnourished, pleasant lady who presents with difficulty swallowing, decreased energy, decreased appetite, and decreased motivation.    She has a history of multiple spinal surgeries, cauda equina syndrome, breast cancer, GERD, hypertension, hypothyroidism, and osteoporosis. She works with an outpatient pain clinic for the management of severe back pain.  She takes hydromorphone and hydrocodone.  Her pain is not well-managed.  She also has a history of alcohol abuse.  The patient and her daughter state she drastically scaled back her alcohol use 4 years ago.  She states she has a small drink twice a month.  The patient reports a severely decreased appetite with difficulty swallowing.  She reports extreme fatigue and difficulty ambulating in the home.  Denies fever, chills, chest pain, cough, abdominal pain, nausea, diarrhea, and urinary symptoms.  There are no aggravating or alleviating factors.    Patient, underwent CT abdomen pelvis, which was negative  Getting evaluated by speech pathologist      Past Medical History:     Past Medical History:   Diagnosis Date    Anxiety     Arthritis     At maximum risk for fall 12/29/2021    caudal equina syndrome and cervical stenosis    Breast  Neutrophils Absolute 3.54 1.3 - 9.1 k/uL    Lymphocytes Absolute 0.83 (L) 1.0 - 4.8 k/uL    Monocytes Absolute 0.18 0.1 - 1.3 k/uL    Eosinophils Absolute 0.00 0.0 - 0.4 k/uL    Basophils Absolute 0.00 0.0 - 0.2 k/uL    Absolute Bands # 0.05 0.0 - 1.0 k/uL    Morphology MACROCYTOSIS PRESENT    Magnesium    Collection Time: 12/13/23  5:40 AM   Result Value Ref Range    Magnesium 1.5 (L) 1.6 - 2.6 mg/dL   Troponin    Collection Time: 12/13/23  5:40 AM   Result Value Ref Range    Troponin, High Sensitivity 51 (H) 0 - 14 ng/L   POC Glucose Fingerstick    Collection Time: 12/13/23  6:34 AM   Result Value Ref Range    POC Glucose 117 (H) 65 - 105 mg/dL       Imaging/Diagnostics:      Assessment :      Primary Problem  Elevated troponin    Active Hospital Problems    Diagnosis Date Noted    Elevated troponin [R79.89] 12/12/2023       Plan:     Patient status Admit as inpatient in the  Progressive Unit/Step down    Admitted with weight loss, difficulty in swallowing, speech pathologist following swallow study ordered  Chronic pain syndrome, history of multiple back surgeries, patient is on methadone at home, missed resume home dose of methadone after swallow study, meanwhile morphine for pain control  Losing weight, likely due to depression, CT abdomen pelvis is negative, chest x-ray negative for malignancy  DVT prophylaxis Lovenox  Hypothyroidism, will resume Synthroid after swallow study  Severe hypokalemia, hypomagnesemia, replacing lites  Readings of high blood pressure, hydralazine as needed  Elevated troponins, no chest pain, cardiology consulted in emergency room, no indication for heparin drip, echo ordered  Patient feeling depressed, consulted psychiatry  History of breast cancer    Consultations:   IP CONSULT TO INTERNAL MEDICINE  IP CONSULT TO CARDIOLOGY  IP CONSULT TO PSYCHIATRY    Patient is admitted as inpatient status because of co-morbidities listed above, severity of signs and symptoms as outlined, requirement for current medical therapies and most importantly because of direct risk to patient if care not provided in a hospital setting. Ken Ya MD  12/13/2023  10:01 AM    Copy sent to Dr. Robby Goodpasture, MD    Please note that this chart was generated using voice recognition Dragon dictation software. Although every effort was made to ensure the accuracy of this automated transcription, some errors in transcription may have occurred.

## 2023-12-13 NOTE — PROGRESS NOTES
SLP ALL NOTES  Facility/Department: [facility]   CLINICAL BEDSIDE SWALLOW EVALUATION    NAME: Wesley Dominique  : 1960  MRN: [id number]    ADMISSION DATE: 2023  ADMITTING DIAGNOSIS: has Stenosis of cervical spine with myelopathy ([address]); Essential hypertension; Hypothyroidism; Lumbar radiculopathy, chronic; Lumbar neuritis; Post laminectomy syndrome; Hypokalemia; Pain of right hip joint; Post-menopausal osteoporosis; Chronic gastritis without bleeding; Elevated CEA; Esophageal dysphagia; Hypotension due to drugs; Ulcerative esophagitis; Weight loss, abnormal; Cervical myelopathy ([address]); Lumbar stenosis with neurogenic claudication; Spinal deformity; Anxiety about health; Cauda equina compression ([address]); Anemia, normocytic normochromic; Iron deficiency; Pseudomonas urinary tract infection; Hypocalcemia; Cauda equina syndrome ([address]); Hemiplegia ([address]); Back pain; Incomplete quadriplegia at C5-6 level ([address]); [place] neck deformity of cervical spine; Acute cystitis without hematuria; Spinal cord injury, cervical region, sequela ([address]); Elevated troponin; Fatigue; Decreased appetite; and NSTEMI (non-ST elevated myocardial infarction) ([address]) on their problem list.    Recent Chest Xray/CT of Chest: ( CXR Date  )Some right basal pleuroparenchymal scarring. Examination is otherwise  unremarkable. Date of Eval: 2023  Evaluating Therapist: ARIEL Chapa    Current Diet level:  Current Diet : NPO  Current Liquid Diet : NPO    Primary Complaint   Per IM H&P: The patient is a 59 y.o. Non- / non  female who presents with Fatigue   and she is admitted to the hospital for the management of fatigue, elevated troponin  Rachid Cervantes is a 52-year-old ill-appearing, malnourished, pleasant lady who presents with difficulty swallowing, decreased energy, decreased appetite, and decreased motivation.     She has a history of multiple spinal surgeries, cauda equina syndrome, breast cancer, GERD, Diet and Intervention  Diet Solids Recommendation: Minced & Moist  Liquid Consistency Recommendation: Mildly Thick (Nectar)     Recommendations: Modified barium swallow study       Compensatory Swallowing Strategies  Compensatory Swallowing Strategies : Small bites/sips;Upright as possible for all oral intake;Eat/Feed slowly; Remain upright for 30-45 minutes after meals    Treatment/Goals   Pending MBS results    General  Behavior/Cognition: Alert; Cooperative  Respiratory Status: O2 via nasual cannula  O2 Device: Nasal cannula  Communication Observation: Functional  Follows Directions: Complex  Dentition: Dentures top;Dentures bottom  Patient Positioning: Upright in bed  Baseline Vocal Quality: Normal  Consistencies Administered: Minced and Moist;Pureed;Mildly Thick - cup; Thin - cup    Vision/Hearing   Functional for assessment    Oral Motor Deficits  Labial: No impairment  Dentition: Upper dentures; Lower dentures  Lingual: No impairment  Consistencies Administered: Minced and Moist;Pureed;Mildly Thick - cup; Thin - cup    Oral Phase Dysfunction  Oral Phase  Oral Phase: Exceptions  Prolonged oral phase. Soft and dry solids not attempted. Indicators of Pharyngeal Phase Dysfunction      Pt. Demonstrated no overt s/s aspiration c pudding, mildly thick liquids or minced and moist solids. Immediate overt s/s aspiration (prolonged productive cough) noted following thin liquid trials. Education  Patient Education: Pt. Perlita Osei present  Patient Education Response: Verbalizes understanding             Therapy Time  SLP Individual Minutes  Time In: 2341  Time Out: 130 [facility]  Minutes: 12        Osorio TOVAR A.CCC/SLP    12/13/2023 1:28 PM

## 2023-12-13 NOTE — CARE COORDINATION
Case Management Assessment  Initial Evaluation    Date/Time of Evaluation: 12/13/2023 10:59 AM  Assessment Completed by: Summer Uriostegui RN    If patient is discharged prior to next notation, then this note serves as note for discharge by case management.    Patient Name: Rajani Iglesias                   YOB: 1960  Diagnosis: Decreased appetite [R63.0]  Elevated troponin [R79.89]  Lack of motivation [Z91.89]  Fatigue, unspecified type [R53.83]                   Date / Time: 12/12/2023  2:54 PM    Patient Admission Status: Inpatient   Readmission Risk (Low < 19, Mod (19-27), High > 27): Readmission Risk Score: 15.5    Current PCP: Giovanna Brasher MD  PCP verified by CM? Yes    Chart Reviewed: Yes      History Provided by: Patient  Patient Orientation: Alert and Oriented    Patient Cognition: Alert    Hospitalization in the last 30 days (Readmission):  No    If yes, Readmission Assessment in  Navigator will be completed.    Advance Directives:      Code Status: Full Code   Patient's Primary Decision Maker is: Legal Next of Kin    Primary Decision Maker: Mary Tejeda - Child - [phone]    Secondary Decision Maker: LG QUINTERO - Domestic Partner - [phone]    Discharge Planning:    Patient lives with: Spouse/Significant Other Type of Home: House  Primary Care Giver: Self  Patient Support Systems include: Spouse/Significant Other   Current Financial resources: Medicare  Current community resources: None  Current services prior to admission: Other (Comment) (Follows with [facility], on Methadone, for her Back/Legs)            Current DME:              Type of Home Care services:  None    ADLS  Prior functional level: Assistance with the following:, Dressing, Cooking, Housework, Shopping  Current functional level: Assistance with the following:, Dressing, Cooking, Housework, Shopping    PT AM-PAC:   /24  OT AM-PAC:   /24    Family can provide assistance at DC: Yes  Would you  like Case Management to discuss the discharge plan with any other family members/significant others, and if so, who? No  Plans to Return to Present Housing: Yes  Other Identified Issues/Barriers to RETURNING to current housing: None  Potential Assistance needed at discharge: Home Care            Potential DME:    Patient expects to discharge to: [address] [place] [address] for transportation at discharge: Other (see comment) (Family)    Financial    Payor: MEDICARE / Plan: MEDICARE PART A AND B / Product Type: *No Product type* /     Does insurance require precert for SNF: No    Potential assistance Purchasing Medications: No  Meds-to-Beds request:        Chris Cronin [phone] Arturo Ware, [address]  [address] [address]  Phone: [phone] Fax: [phone]      Notes:    Factors facilitating achievement of predicted outcomes: Family support, Cooperative, Pleasant, and Has needed Durable Medical Equipment at home    Barriers to discharge: Depression and Decreased appetite, Not taking meds    Additional Case Management Notes: 12/13/23 Medicare Pt. Lives in 2 story home, has [employer] 1st floor, w/ . DME- GB, Cane, walker, WC, SC, Agreeable to VNS, List given, PT/OT/ST, Psych to see for Depression, Not eating. CT, Cardio, Echo, Follows at [facility]- On Methadone for Back/Legs. OH 16%. Nancy will need signed/completed//KB    The Plan for Transition of Care is related to the following treatment goals of Decreased appetite [R63.0]  Elevated troponin [R79.89]  Lack of motivation [Z91.89]  Fatigue, unspecified type [E92.41]    IF APPLICABLE: The Patient and/or patient representative Melanie Arreguin and her family were provided with a choice of provider and agrees with the discharge plan.  Freedom of choice list with basic dialogue that supports the patient's individualized plan of care/goals and shares the quality data associated with the providers was provided to: Patient   Patient Representative Name:       The Patient and/or Patient Representative Agree with the Discharge Plan? Yes    David Han RN  Case Management Department  Ph:  Fax:                           Post Acute Facility/Agency List     Provided patient with the following list, the list includes the overall star ratings obtained from CMS per the Medicare Web site (www.Medicare.gov):     [] [address]  [] Acute Inpatient Rehabilitation Facilities  [x] Skilled Nursing Facilities  [x] Home Care    Provided verbal instructions on how to utilize the QR Code to obtain additional detailed star ratings from www. Medicare. gov     offered to print and provide the detailed list:    [x]Accepted   []Declined    Following for choices.

## 2023-12-13 NOTE — PROGRESS NOTES
[facility] Internal Medicine  Jamir Head MD; Quintin Matias MD; Lucero Clark MD; MD Aditya Murrieta MD; MD KEENAN [facility]   [address]                 Date:   12/12/2023  Patientname:  Rosa Maria Pinzon  Date of admission:  12/12/2023  2:54 PM  MRN:   [id number]  Account:  [de-identified]  YOB: 1960  PCP:    Phoebe Casillas MD  Room:   09/09  Code Status:    Full      Chief Complaint:     Chief Complaint   Patient presents with    Fatigue       History of Present Illness:       Randall Lane is a 80-year-old ill-appearing, malnourished, pleasant lady who presents with difficulty swallowing, decreased energy, decreased appetite, and decreased motivation. She has a history of multiple spinal surgeries, cauda equina syndrome, breast cancer, GERD, hypertension, hypothyroidism, and osteoporosis. She works with an outpatient pain clinic for the management of severe back pain. She takes hydromorphone and hydrocodone. Her pain is not well-managed. She also has a history of alcohol abuse. The patient and her daughter state she drastically scaled back her alcohol use 4 years ago. She states she has a small drink twice a month. The patient reports a severely decreased appetite with difficulty swallowing. She reports extreme fatigue and difficulty ambulating in the home. Denies fever, chills, chest pain, cough, abdominal pain, nausea, diarrhea, and urinary symptoms. There are no aggravating or alleviating factors.       Past Medical History:     Past Medical History:   Diagnosis Date    Anxiety     Arthritis     At maximum risk for fall 12/29/2021    caudal equina syndrome and cervical stenosis    Breast cancer ([address])     Cancer (HCC)     right breast    Cauda equina syndrome ([address]) 12/29/2021    neuropathy, mobility difficulty, incontinance    Cervical stenosis of spine 12/29/2021    GERD (gastroesophageal reflux 10/24/22   Justina Rivero MD   aspirin EC 81 MG EC tablet Take 1 tablet by mouth daily  Patient not taking: Reported on 12/12/2023 10/17/22   Justina Rivero MD   Handicap Placard MISC by Does not apply route Cannot walk 200 feet due to medical issues  Expires 7/14/2027 7/15/22   Justina Rivero MD   HYDROcodone-acetaminophen (NORCO) 5-325 MG per tablet Take 1 tablet by mouth every 8 hours as needed for Pain. Provider, MD Robert   methadone (DOLOPHINE) 10 MG tablet Take 1 tablet by mouth 2 times daily. Provider, MD Robert        Allergies:     Latex and Kiwi extract    Social History:     Tobacco:    reports that she quit smoking about 9 years ago. Her smoking use included cigarettes. She has a 15.00 pack-year smoking history. She has never used smokeless tobacco.  Alcohol:      reports current alcohol use. Drug Use:  reports current drug use. Drug: Prescription.     Family History:     Family History   Problem Relation Age of Onset    Hypertension Mother     Heart Disease Mother     Cancer Mother        Investigations:      Laboratory Testing:  Recent Results (from the past 25 hour(s))   TSH    Collection Time: 12/12/23  3:30 PM   Result Value Ref Range    TSH 0.62 0.30 - 5.00 uIU/mL   T4, Free    Collection Time: 12/12/23  3:30 PM   Result Value Ref Range    T4 Free 1.5 0.9 - 1.7 ng/dL   CBC with Auto Differential    Collection Time: 12/12/23  3:30 PM   Result Value Ref Range    WBC 3.9 3.5 - 11.0 k/uL    RBC 3.13 (L) 4.0 - 5.2 m/uL    Hemoglobin 11.8 (L) 12.0 - 16.0 g/dL    Hematocrit 35.3 (L) 36 - 46 %    .8 (H) 80 - 100 fL    MCH 37.7 (H) 26 - 34 pg    MCHC 33.4 31 - 37 g/dL    RDW 14.7 11.5 - 14.9 %    Platelets 934 (L) 446 - 450 k/uL    MPV 7.7 6.0 - 12.0 fL    Neutrophils % 83 (H) 36 - 66 %    Lymphocytes % 12 (L) 24 - 44 %    Monocytes % 4 1 - 7 %    Eosinophils % 0 0 - 4 %    Basophils % 1 0 - 2 %    Neutrophils Absolute 3.23 1.3 - 9.1 k/uL    Lymphocytes Absolute 0.47 (L) 1.0

## 2023-12-13 NOTE — PROGRESS NOTES
Physical Therapy  Facility/Department: [address] [place] [address]  Physical Therapy Initial Assessment    Name: Naty Graham  : 1960  MRN: [id number]  Date of Service: 2023    Discharge Recommendations:  Patient would benefit from continued therapy after discharge, Therapy recommended at discharge, Continue to assess pending progress   PT Equipment Recommendations  Equipment Needed: No      Patient Diagnosis(es): The primary encounter diagnosis was Fatigue, unspecified type. Diagnoses of Lack of motivation, Decreased appetite, and Elevated troponin were also pertinent to this visit. Past Medical History:  has a past medical history of Anxiety, Arthritis, At maximum risk for fall, Breast cancer ([address]), Cancer ([address]), Cauda equina syndrome ([address]), Cervical stenosis of spine, GERD (gastroesophageal reflux disease), History of stomach ulcers, History of therapeutic radiation, Hx antineoplastic chemo, Hypertension, Hypothyroidism, Lumbar neuritis, Post laminectomy syndrome, Spinal deformity, Thyroid disease, Uses wheelchair, Wears dentures, and Wellness examination. Past Surgical History:  has a past surgical history that includes hernia repair (Bilateral); Breast surgery (Right); Mastectomy, radical; Hysterectomy, total abdominal; Lumbar spine surgery (N/A, 2021); back surgery; Colonoscopy (about ); other surgical history (2022); cervical fusion (N/A, 2022); and cervical fusion (N/A, 2022). Assessment   Body Structures, Functions, Activity Limitations Requiring Skilled Therapeutic Intervention: Decreased functional mobility ; Decreased endurance;Decreased strength;Decreased balance  Assessment: continue per POC to maxmize potential- pt remains a high fall risk  Treatment Diagnosis: impaired mobility due to debility/weakness  Specific Instructions for Next Treatment: advance transfers w/ 2 assist using olga lidia petit, instruct in exercise program, advance to gait as tolerated, co treat w/

## 2023-12-13 NOTE — PROGRESS NOTES
Occupational Therapy  [facility]   Occupational Therapy Evaluation  Date: 23  Patient Name: Rajani Iglesias       Room: 8/8-01  MRN: [id number]  Account: [id number]   : 1960  (63 y.o.) Gender: female     Discharge Recommendations:  Further Occupational Therapy is recommended upon facility discharge.    OT Equipment Recommendations  Other: TBD    Referring Practitioner: Param Andres MD  Diagnosis: Elevated troponin Additional Pertinent Hx: Per chart: Rajani Iglesias is a 63 y.o. female who presents with depression, decreased appetite.  Daughter brought patient in due to being concerned about her having depression.  Per daughter, patient lives with her  however her  works throughout the weekend is not home often therefore patient is frequently home alone.  Daughter states that the patient is not eating, drinking, taking her medications.  Daughter states that she appears to have no desire to do anything and is concerned that she is depressed.  Patient did state that she does feel depressed.  Patient states that she has no interest in anything anymore, has no appetite, states that food is just not appealing to her.  Patient states that she does not remember to take her medication because she often sleeps.  Patient and daughter states this has been going on for a couple of months now however it is getting worse therefore she came to the emergency department to be evaluated.    Treatment Diagnosis: impaired self care status    Past Medical History:  has a past medical history of Anxiety, Arthritis, At maximum risk for fall, Breast cancer (HCC), Cancer (HCC), Cauda equina syndrome (HCC), Cervical stenosis of spine, GERD (gastroesophageal reflux disease), History of stomach ulcers, History of therapeutic radiation, Hx antineoplastic chemo, Hypertension, Hypothyroidism, Lumbar neuritis, Post laminectomy syndrome, Spinal deformity, Thyroid disease, Uses wheelchair,  partially elevated with use of bed rail    Balance  Balance  Sitting Balance: Stand by assistance (close SBA for safety due to impulsivity at times, no loss of balance during unsupported sitting tasks)  Standing Balance: Dependent/Total (Max A x2)  Standing Balance  Time: 1 minute  Activity: functional transfers, self care/ADLs  Comment: with RW pt very unsteady/unsafe; trials Gregg Zaldivar and able to static stand with Min A x2    Transfers  Transfers  Stand Pivot Transfers: 2 Person assistance, Maximum assistance (From edge of bed to bedside chair)  Sit to stand: 2 Person assistance, Maximum assistance  Stand to sit: 2 Person assistance, Maximum assistance  Transfer Comments: stand pivot transfer from edge of bed to bedside chair; pt uses Gregg Zaldivar for safety to get from chair back to bed due to BLE weakness/buckling    Functional Mobility  Functional Mobility Comments: unable to assess at this time as pt unable to tolerate static standing with RW    Assessment  Assessment  Performance deficits / Impairments: Decreased functional mobility , Decreased ADL status, Decreased strength, Decreased safe awareness, Decreased cognition, Decreased endurance, Decreased sensation, Decreased balance, Decreased high-level IADLs, Decreased coordination  Treatment Diagnosis: impaired self care status  Prognosis: Good  Decision Making: High Complexity  Discharge Recommendations: Patient would benefit from continued therapy after discharge    Activity Tolerance  Activity Tolerance: Patient Tolerated treatment well, Patient limited by fatigue  Activity Tolerance Comments: pt requests to lay in bed after sitting in chair for ~8 minutes due to fatigue/decreased activity tolerance    Safety Devices  Type of Devices: Nurse notified, Left in bed, Call light within reach, Bed alarm in place, All fall risk precautions in place, Heels elevated for pressure relief    Patient Education  Patient Education  Education Given To: Patient, Family  Education Provided: Role of Therapy, Plan of Care, Energy Conservation, Transfer Training  Education Method: Demonstration, Verbal  Barriers to Learning: Cognition  Education Outcome: Verbalized understanding, Continued education needed      Functional Outcome Measures  AM-PAC Daily Activity - Inpatient   How much help is needed for putting on and taking off regular lower body clothing?: Total  How much help is needed for bathing (which includes washing, rinsing, drying)?: Total  How much help is needed for toileting (which includes using toilet, bedpan, or urinal)?: Total  How much help is needed for putting on and taking off regular upper body clothing?: A Little  How much help is needed for taking care of personal grooming?: A Little  How much help for eating meals?:  (NPO)       Goals     Short Term Goals  Time Frame for Short Term Goals: By discharge, pt will  Short Term Goal 1: perform functional transfers with min A x2, least restrictive device, and Good safety  Short Term Goal 2: OT to further assess functional mobility when appropriate and notify OTR to update goal  Short Term Goal 3: tolerate standing for 5+ minutes with Min Ax2 during self care/functional activity, least restrictive device, and Good safety  Short Term Goal 4: perform lower body ADLs with Mod A and adaptive equipment as needed  Short Term Goal 5: perform upper body ADLs with setup  Short Term Goal 6: actively participate in 15+ minutes of therapeutic exercise/functional activity to increase overall strength/endurance needed for ADLs/IADLs    Plan  Occupational Therapy Plan  Times Per Week: 4-6  Current Treatment Recommendations: Strengthening, Balance training, Functional mobility training, Endurance training, Pain management, Safety education & training, Patient/Caregiver education & training, Equipment evaluation, education, & procurement, Positioning, Self-Care / ADL, Home management training, Cognitive/Perceptual training,

## 2023-12-14 ENCOUNTER — APPOINTMENT (OUTPATIENT)
Dept: GENERAL RADIOLOGY | Age: 63
DRG: 311 | End: 2023-12-14
Payer: MEDICARE

## 2023-12-14 PROBLEM — I50.30 DIASTOLIC HEART FAILURE (HCC): Status: ACTIVE | Noted: 2023-12-14

## 2023-12-14 LAB
AMORPH SED URNS QL MICRO: ABNORMAL
ANION GAP SERPL CALCULATED.3IONS-SCNC: 10 MMOL/L (ref 9–17)
BACTERIA URNS QL MICRO: ABNORMAL
BILIRUB UR QL STRIP: ABNORMAL
BUN SERPL-MCNC: 7 MG/DL (ref 8–23)
CALCIUM SERPL-MCNC: 8.1 MG/DL (ref 8.6–10.4)
CHLORIDE SERPL-SCNC: 94 MMOL/L (ref 98–107)
CLARITY UR: CLEAR
CO2 SERPL-SCNC: 25 MMOL/L (ref 20–31)
COLOR UR: ABNORMAL
CREAT SERPL-MCNC: 0.5 MG/DL (ref 0.5–0.9)
EPI CELLS #/AREA URNS HPF: ABNORMAL /HPF
GFR SERPL CREATININE-BSD FRML MDRD: >60 ML/MIN/1.73M2
GLUCOSE BLD-MCNC: 124 MG/DL (ref 65–105)
GLUCOSE BLD-MCNC: 124 MG/DL (ref 65–105)
GLUCOSE BLD-MCNC: 135 MG/DL (ref 65–105)
GLUCOSE BLD-MCNC: 153 MG/DL (ref 65–105)
GLUCOSE BLD-MCNC: 189 MG/DL (ref 65–105)
GLUCOSE SERPL-MCNC: 138 MG/DL (ref 70–99)
GLUCOSE UR STRIP-MCNC: ABNORMAL MG/DL
HGB UR QL STRIP.AUTO: NEGATIVE
KETONES UR STRIP-MCNC: ABNORMAL MG/DL
LEUKOCYTE ESTERASE UR QL STRIP: ABNORMAL
MAGNESIUM SERPL-MCNC: 1.3 MG/DL (ref 1.6–2.6)
MAGNESIUM SERPL-MCNC: 2.6 MG/DL (ref 1.6–2.6)
NITRITE UR QL STRIP: NEGATIVE
PH UR STRIP: 7.5 [PH] (ref 5–8)
POTASSIUM SERPL-SCNC: 2.5 MMOL/L (ref 3.7–5.3)
POTASSIUM SERPL-SCNC: 4 MMOL/L (ref 3.7–5.3)
PROT UR STRIP-MCNC: NEGATIVE MG/DL
RBC #/AREA URNS HPF: ABNORMAL /HPF
SODIUM SERPL-SCNC: 129 MMOL/L (ref 135–144)
SP GR UR STRIP: 1.01 (ref 1–1.03)
UROBILINOGEN UR STRIP-ACNC: ABNORMAL EU/DL (ref 0–1)
WBC #/AREA URNS HPF: ABNORMAL /HPF

## 2023-12-14 PROCEDURE — 51701 INSERT BLADDER CATHETER: CPT

## 2023-12-14 PROCEDURE — 51798 US URINE CAPACITY MEASURE: CPT

## 2023-12-14 PROCEDURE — 92611 MOTION FLUOROSCOPY/SWALLOW: CPT

## 2023-12-14 PROCEDURE — 80048 BASIC METABOLIC PNL TOTAL CA: CPT

## 2023-12-14 PROCEDURE — 6360000002 HC RX W HCPCS: Performed by: INTERNAL MEDICINE

## 2023-12-14 PROCEDURE — 6360000002 HC RX W HCPCS: Performed by: NURSE PRACTITIONER

## 2023-12-14 PROCEDURE — 99233 SBSQ HOSP IP/OBS HIGH 50: CPT | Performed by: INTERNAL MEDICINE

## 2023-12-14 PROCEDURE — 6370000000 HC RX 637 (ALT 250 FOR IP): Performed by: NURSE PRACTITIONER

## 2023-12-14 PROCEDURE — 36415 COLL VENOUS BLD VENIPUNCTURE: CPT

## 2023-12-14 PROCEDURE — 82607 VITAMIN B-12: CPT

## 2023-12-14 PROCEDURE — 2060000000 HC ICU INTERMEDIATE R&B

## 2023-12-14 PROCEDURE — 2500000003 HC RX 250 WO HCPCS: Performed by: NURSE PRACTITIONER

## 2023-12-14 PROCEDURE — 6370000000 HC RX 637 (ALT 250 FOR IP): Performed by: PSYCHIATRY & NEUROLOGY

## 2023-12-14 PROCEDURE — 82746 ASSAY OF FOLIC ACID SERUM: CPT

## 2023-12-14 PROCEDURE — 81001 URINALYSIS AUTO W/SCOPE: CPT

## 2023-12-14 PROCEDURE — 2580000003 HC RX 258: Performed by: NURSE PRACTITIONER

## 2023-12-14 PROCEDURE — 83735 ASSAY OF MAGNESIUM: CPT

## 2023-12-14 PROCEDURE — 6370000000 HC RX 637 (ALT 250 FOR IP): Performed by: INTERNAL MEDICINE

## 2023-12-14 PROCEDURE — 84132 ASSAY OF SERUM POTASSIUM: CPT

## 2023-12-14 PROCEDURE — 74230 X-RAY XM SWLNG FUNCJ C+: CPT

## 2023-12-14 PROCEDURE — 82947 ASSAY GLUCOSE BLOOD QUANT: CPT

## 2023-12-14 RX ORDER — LORAZEPAM 2 MG/ML
0.5 INJECTION INTRAMUSCULAR EVERY 6 HOURS PRN
Status: DISCONTINUED | OUTPATIENT
Start: 2023-12-14 | End: 2023-12-20 | Stop reason: HOSPADM

## 2023-12-14 RX ADMIN — BUSPIRONE HYDROCHLORIDE 30 MG: 15 TABLET ORAL at 21:21

## 2023-12-14 RX ADMIN — MAGNESIUM SULFATE HEPTAHYDRATE 2000 MG: 40 INJECTION, SOLUTION INTRAVENOUS at 06:34

## 2023-12-14 RX ADMIN — BUSPIRONE HYDROCHLORIDE 30 MG: 15 TABLET ORAL at 08:34

## 2023-12-14 RX ADMIN — POTASSIUM CHLORIDE 10 MEQ: 7.46 INJECTION, SOLUTION INTRAVENOUS at 13:16

## 2023-12-14 RX ADMIN — PANTOPRAZOLE SODIUM 40 MG: 40 TABLET, DELAYED RELEASE ORAL at 08:34

## 2023-12-14 RX ADMIN — POTASSIUM CHLORIDE 10 MEQ: 7.46 INJECTION, SOLUTION INTRAVENOUS at 12:20

## 2023-12-14 RX ADMIN — LEVOTHYROXINE SODIUM 100 MCG: 0.1 TABLET ORAL at 08:35

## 2023-12-14 RX ADMIN — DILTIAZEM HYDROCHLORIDE 180 MG: 180 CAPSULE, COATED, EXTENDED RELEASE ORAL at 08:34

## 2023-12-14 RX ADMIN — POTASSIUM CHLORIDE 10 MEQ: 7.46 INJECTION, SOLUTION INTRAVENOUS at 07:22

## 2023-12-14 RX ADMIN — METHADONE HYDROCHLORIDE 10 MG: 10 TABLET ORAL at 21:19

## 2023-12-14 RX ADMIN — ASPIRIN 81 MG: 81 TABLET, COATED ORAL at 08:34

## 2023-12-14 RX ADMIN — POTASSIUM CHLORIDE 10 MEQ: 7.46 INJECTION, SOLUTION INTRAVENOUS at 09:22

## 2023-12-14 RX ADMIN — LORAZEPAM 0.5 MG: 2 INJECTION INTRAMUSCULAR; INTRAVENOUS at 18:50

## 2023-12-14 RX ADMIN — POTASSIUM CHLORIDE 10 MEQ: 7.46 INJECTION, SOLUTION INTRAVENOUS at 10:30

## 2023-12-14 RX ADMIN — DEXTROSE AND SODIUM CHLORIDE: 5; 900 INJECTION, SOLUTION INTRAVENOUS at 06:30

## 2023-12-14 RX ADMIN — Medication 3 MG: at 21:21

## 2023-12-14 RX ADMIN — ENOXAPARIN SODIUM 40 MG: 100 INJECTION SUBCUTANEOUS at 08:34

## 2023-12-14 RX ADMIN — MAGNESIUM SULFATE HEPTAHYDRATE 2000 MG: 40 INJECTION, SOLUTION INTRAVENOUS at 08:43

## 2023-12-14 RX ADMIN — METHADONE HYDROCHLORIDE 10 MG: 10 TABLET ORAL at 08:34

## 2023-12-14 RX ADMIN — CARVEDILOL 3.12 MG: 3.12 TABLET, FILM COATED ORAL at 17:52

## 2023-12-14 RX ADMIN — SODIUM CHLORIDE, PRESERVATIVE FREE 10 ML: 5 INJECTION INTRAVENOUS at 21:17

## 2023-12-14 RX ADMIN — CARVEDILOL 3.12 MG: 3.12 TABLET, FILM COATED ORAL at 08:34

## 2023-12-14 RX ADMIN — LISINOPRIL 5 MG: 5 TABLET ORAL at 08:34

## 2023-12-14 RX ADMIN — SERTRALINE HYDROCHLORIDE 25 MG: 50 TABLET ORAL at 08:34

## 2023-12-14 RX ADMIN — POTASSIUM CHLORIDE 10 MEQ: 7.46 INJECTION, SOLUTION INTRAVENOUS at 14:41

## 2023-12-14 ASSESSMENT — PAIN SCALES - WONG BAKER: WONGBAKER_NUMERICALRESPONSE: 2

## 2023-12-14 ASSESSMENT — PAIN SCALES - GENERAL
PAINLEVEL_OUTOF10: 5
PAINLEVEL_OUTOF10: 7

## 2023-12-14 ASSESSMENT — PAIN DESCRIPTION - DESCRIPTORS: DESCRIPTORS: ACHING

## 2023-12-14 ASSESSMENT — PAIN DESCRIPTION - ORIENTATION: ORIENTATION: UPPER

## 2023-12-14 ASSESSMENT — PAIN DESCRIPTION - LOCATION
LOCATION: BACK
LOCATION: BACK;LEG;NECK

## 2023-12-14 NOTE — PROGRESS NOTES
[address] [facility]   OCCUPATIONAL THERAPY MISSED TREATMENT NOTE   INPATIENT   Date: 23  Patient Name: Michael Hirsch       Room: 2204/6073-63  MRN: [id number]   Account #: [de-identified]    : 1960  (59 y.o.)  Gender: female   Referring Practitioner: Anette Gale MD  Diagnosis: Elevated troponin             REASON FOR MISSED TREATMENT:  Patient unable to participate   -    Patient going down for swallow study. OT will continue to follow and see patient as time permits.  1347      Electronically signed by СЕРГЕЙ Lang on 23 at 2:20 PM EST

## 2023-12-14 NOTE — PROGRESS NOTES
Patient becoming agitated  Haldol cannot be given because patient is on methadone  Starting low-dose Ativan

## 2023-12-14 NOTE — PROGRESS NOTES
[address]    HISTORY AND PHYSICAL EXAMINATION            Date:   12/14/2023  Patient name:  Anthony Flaherty  Date of admission:  12/12/2023  2:54 PM  MRN:   [id number]  Account:  [de-identified]  YOB: 1960  PCP:    Da Bowers MD  Room:   9094/1401-89  Code Status:    Full Code    Chief Complaint:     Chief Complaint   Patient presents with    Fatigue       History Obtained From:     patient, electronic medical record    History of Present Illness: The patient is a 59 y.o. Non- / non  female who presents with Fatigue   and she is admitted to the hospital for the management of fatigue, elevated troponin  Melanie Arreguin is a 56-year-old ill-appearing, malnourished, pleasant lady who presents with difficulty swallowing, decreased energy, decreased appetite, and decreased motivation. She has a history of multiple spinal surgeries, cauda equina syndrome, breast cancer, GERD, hypertension, hypothyroidism, and osteoporosis. She works with an outpatient pain clinic for the management of severe back pain. She takes hydromorphone and hydrocodone. Her pain is not well-managed. She also has a history of alcohol abuse. The patient and her daughter state she drastically scaled back her alcohol use 4 years ago. She states she has a small drink twice a month. The patient reports a severely decreased appetite with difficulty swallowing. She reports extreme fatigue and difficulty ambulating in the home. Denies fever, chills, chest pain, cough, abdominal pain, nausea, diarrhea, and urinary symptoms. There are no aggravating or alleviating factors.     Patient, underwent CT abdomen pelvis, which was negative  Getting evaluated by speech pathologist      Past Medical History:     Past Medical History:   Diagnosis Date    Anxiety     Arthritis     At maximum risk for fall 12/29/2021    caudal equina syndrome and cervical stenosis    Breast LVOT:AV VTI Index 0.88     RAYMOND/BSA VTI 1.3 cm2/m2    RAYMOND/BSA Peak Velocity 1.1 cm2/m2    MV:LVOT VTI Index 1.57     Est. RA Pressure 8 mmHg    RVSP 32 mmHg   POC Glucose Fingerstick    Collection Time: 12/13/23  4:05 PM   Result Value Ref Range    POC Glucose 222 (H) 65 - 105 mg/dL   POC Glucose Fingerstick    Collection Time: 12/14/23 12:25 AM   Result Value Ref Range    POC Glucose 135 (H) 65 - 105 mg/dL   Potassium    Collection Time: 12/14/23  5:19 AM   Result Value Ref Range    Potassium 2.5 (LL) 3.7 - 5.3 mmol/L   Magnesium    Collection Time: 12/14/23  5:19 AM   Result Value Ref Range    Magnesium 1.3 (L) 1.6 - 2.6 mg/dL   POC Glucose Fingerstick    Collection Time: 12/14/23  6:11 AM   Result Value Ref Range    POC Glucose 189 (H) 65 - 105 mg/dL       Imaging/Diagnostics:      Assessment :      Primary Problem  Elevated troponin    Active Hospital Problems    Diagnosis Date Noted    Fatigue [R53.83] 12/13/2023     Priority: High    Decreased appetite [R63.0] 12/13/2023     Priority: High    NSTEMI (non-ST elevated myocardial infarction) (HCC) [I21.4] 12/13/2023     Priority: High    Depression [F32.A] 12/13/2023    Elevated troponin [R79.89] 12/12/2023       Plan:     Patient status Admit as inpatient in the  Progressive Unit/Step down    Admitted with weight loss, difficulty in swallowing, speech pathologist following swallow study ordered  Chronic pain syndrome, history of multiple back surgeries, patient is on methadone at home, missed resume home dose of methadone after swallow study, meanwhile morphine for pain control  Losing weight, likely due to depression, CT abdomen pelvis is negative, chest x-ray negative for malignancy  DVT prophylaxis Lovenox  Hypothyroidism, will resume Synthroid after swallow study  Severe hypokalemia, hypomagnesemia, replacing lites  Readings of high blood pressure, hydralazine as needed  Elevated troponins, no chest pain, cardiology consulted in emergency room, no indication

## 2023-12-14 NOTE — PROGRESS NOTES
Latest Reference Range & Units 12/14/23 05:19   Potassium 3.7 - 5.3 mmol/L 2.5 (LL)   (LL): Data is critically low     [address] [place] [address] 165 notified.

## 2023-12-14 NOTE — CARE COORDINATION
ONGOING DISCHARGE PLAN:    Barium swallow study      Echo     Patient confused     Will continue to follow for additional discharge needs. If patient is discharged prior to next notation, then this note serves as note for discharge by case management.     Electronically signed by Cisco Morejon RN on 12/14/2023 at 2:58 PM

## 2023-12-14 NOTE — PROGRESS NOTES
DATE: 2023    NAME: Lanre Choi  MRN: [id number]   : 1960    Patient not seen this date for Physical Therapy due to:      [] Cancel by RN or physician due to:    [] Hemodialysis    [] Critical Lab Value Level     [] Blood transfusion in progress    [] Acute or unstable cardiovascular status   _MAP < 55 or more than >115  _HR < 40 or > 130    [] Acute or unstable pulmonary status   -FiO2 > 60%   _RR < 5 or >40    _O2 sats < 85%    [] Strict Bedrest    [] Off Unit for surgery or procedure    [x] Off Unit for testing, swallow study       [] Pending imaging to R/O fracture    [] Refusal by Patient      [] Other      [] PT being discontinued at this time. Patient independent. No further needs. [] PT being discontinued at this time as the patient has been transferred to hospice care. No further needs.       Ru Sr, PTA

## 2023-12-14 NOTE — PROGRESS NOTES
Patient continually pulling at IV and tubing. Easily redirected but pulls at them again as soon as RN walks away. Telesitter initiated and updated regarding patient pulling at lines.

## 2023-12-14 NOTE — PROGRESS NOTES
Received multiple calls from telesitter that patient continues to pull at her lines but patient is not listening to the redirection from them. 1:1 sitter initiated.

## 2023-12-14 NOTE — PROGRESS NOTES
Patient becoming increasingly agitated/combatative. Trying to get out of bed. 1:1 continues. Dr. Marysue Lombard notified, orders received for prn ativan.

## 2023-12-14 NOTE — PLAN OF CARE
Problem: Discharge Planning  Goal: Discharge to home or other facility with appropriate resources  Outcome: Progressing     Problem: Pain  Goal: Verbalizes/displays adequate comfort level or baseline comfort level  Outcome: Progressing     Problem: Skin/Tissue Integrity  Goal: Absence of new skin breakdown  Description: 1. Monitor for areas of redness and/or skin breakdown  2. Assess vascular access sites hourly  3. Every 4-6 hours minimum:  Change oxygen saturation probe site  4. Every 4-6 hours:  If on nasal continuous positive airway pressure, respiratory therapy assess nares and determine need for appliance change or resting period. Outcome: Progressing  Note: Skin integrity unchanged     Problem: ABCDS Injury Assessment  Goal: Absence of physical injury  Outcome: Progressing     Problem: Safety - Adult  Goal: Free from fall injury  Outcome: Progressing  Note: Bed alarm on, call light in reach. Never smoker

## 2023-12-15 ENCOUNTER — APPOINTMENT (OUTPATIENT)
Dept: MRI IMAGING | Age: 63
DRG: 311 | End: 2023-12-15
Payer: MEDICARE

## 2023-12-15 ENCOUNTER — TELEPHONE (OUTPATIENT)
Dept: PRIMARY CARE CLINIC | Age: 63
End: 2023-12-15

## 2023-12-15 PROBLEM — R41.0 ACUTE DELIRIUM: Status: ACTIVE | Noted: 2023-12-15

## 2023-12-15 LAB
ANION GAP SERPL CALCULATED.3IONS-SCNC: 8 MMOL/L (ref 9–17)
BUN SERPL-MCNC: 5 MG/DL (ref 8–23)
CALCIUM SERPL-MCNC: 7.8 MG/DL (ref 8.6–10.4)
CHLORIDE SERPL-SCNC: 96 MMOL/L (ref 98–107)
CO2 SERPL-SCNC: 27 MMOL/L (ref 20–31)
CREAT SERPL-MCNC: <0.4 MG/DL (ref 0.5–0.9)
FOLATE SERPL-MCNC: 3.6 NG/ML
GFR SERPL CREATININE-BSD FRML MDRD: ABNORMAL ML/MIN/1.73M2
GLUCOSE BLD-MCNC: 110 MG/DL (ref 65–105)
GLUCOSE BLD-MCNC: 120 MG/DL (ref 65–105)
GLUCOSE BLD-MCNC: 131 MG/DL (ref 65–105)
GLUCOSE BLD-MCNC: 135 MG/DL (ref 65–105)
GLUCOSE SERPL-MCNC: 114 MG/DL (ref 70–99)
MAGNESIUM SERPL-MCNC: 1.9 MG/DL (ref 1.6–2.6)
POTASSIUM SERPL-SCNC: 3.2 MMOL/L (ref 3.7–5.3)
SODIUM SERPL-SCNC: 131 MMOL/L (ref 135–144)
VIT B12 SERPL-MCNC: 689 PG/ML (ref 232–1245)

## 2023-12-15 PROCEDURE — 36415 COLL VENOUS BLD VENIPUNCTURE: CPT

## 2023-12-15 PROCEDURE — 92526 ORAL FUNCTION THERAPY: CPT

## 2023-12-15 PROCEDURE — 80048 BASIC METABOLIC PNL TOTAL CA: CPT

## 2023-12-15 PROCEDURE — 6370000000 HC RX 637 (ALT 250 FOR IP): Performed by: INTERNAL MEDICINE

## 2023-12-15 PROCEDURE — 6370000000 HC RX 637 (ALT 250 FOR IP): Performed by: PSYCHIATRY & NEUROLOGY

## 2023-12-15 PROCEDURE — 99233 SBSQ HOSP IP/OBS HIGH 50: CPT | Performed by: INTERNAL MEDICINE

## 2023-12-15 PROCEDURE — 99232 SBSQ HOSP IP/OBS MODERATE 35: CPT | Performed by: INTERNAL MEDICINE

## 2023-12-15 PROCEDURE — 97530 THERAPEUTIC ACTIVITIES: CPT

## 2023-12-15 PROCEDURE — 2580000003 HC RX 258: Performed by: NURSE PRACTITIONER

## 2023-12-15 PROCEDURE — 97110 THERAPEUTIC EXERCISES: CPT

## 2023-12-15 PROCEDURE — 70551 MRI BRAIN STEM W/O DYE: CPT

## 2023-12-15 PROCEDURE — 6370000000 HC RX 637 (ALT 250 FOR IP): Performed by: NURSE PRACTITIONER

## 2023-12-15 PROCEDURE — 82947 ASSAY GLUCOSE BLOOD QUANT: CPT

## 2023-12-15 PROCEDURE — 83735 ASSAY OF MAGNESIUM: CPT

## 2023-12-15 PROCEDURE — 2060000000 HC ICU INTERMEDIATE R&B

## 2023-12-15 PROCEDURE — 6360000002 HC RX W HCPCS: Performed by: NURSE PRACTITIONER

## 2023-12-15 PROCEDURE — 97116 GAIT TRAINING THERAPY: CPT

## 2023-12-15 PROCEDURE — 99223 1ST HOSP IP/OBS HIGH 75: CPT | Performed by: PSYCHIATRY & NEUROLOGY

## 2023-12-15 RX ORDER — CARVEDILOL 6.25 MG/1
6.25 TABLET ORAL 2 TIMES DAILY WITH MEALS
Status: DISCONTINUED | OUTPATIENT
Start: 2023-12-15 | End: 2023-12-20 | Stop reason: HOSPADM

## 2023-12-15 RX ORDER — LISINOPRIL 10 MG/1
10 TABLET ORAL DAILY
Status: DISCONTINUED | OUTPATIENT
Start: 2023-12-15 | End: 2023-12-20 | Stop reason: HOSPADM

## 2023-12-15 RX ADMIN — DILTIAZEM HYDROCHLORIDE 180 MG: 180 CAPSULE, COATED, EXTENDED RELEASE ORAL at 09:17

## 2023-12-15 RX ADMIN — METHADONE HYDROCHLORIDE 10 MG: 10 TABLET ORAL at 21:00

## 2023-12-15 RX ADMIN — POTASSIUM BICARBONATE 40 MEQ: 782 TABLET, EFFERVESCENT ORAL at 10:08

## 2023-12-15 RX ADMIN — PANTOPRAZOLE SODIUM 40 MG: 40 TABLET, DELAYED RELEASE ORAL at 09:17

## 2023-12-15 RX ADMIN — METHADONE HYDROCHLORIDE 10 MG: 10 TABLET ORAL at 09:16

## 2023-12-15 RX ADMIN — DEXTROSE AND SODIUM CHLORIDE: 5; 900 INJECTION, SOLUTION INTRAVENOUS at 22:10

## 2023-12-15 RX ADMIN — LEVOTHYROXINE SODIUM 100 MCG: 0.1 TABLET ORAL at 06:24

## 2023-12-15 RX ADMIN — ASPIRIN 81 MG: 81 TABLET, COATED ORAL at 09:17

## 2023-12-15 RX ADMIN — BUSPIRONE HYDROCHLORIDE 30 MG: 15 TABLET ORAL at 21:00

## 2023-12-15 RX ADMIN — ENOXAPARIN SODIUM 40 MG: 100 INJECTION SUBCUTANEOUS at 09:17

## 2023-12-15 RX ADMIN — HYDROCODONE BITARTRATE AND ACETAMINOPHEN 1 TABLET: 5; 325 TABLET ORAL at 19:17

## 2023-12-15 RX ADMIN — LISINOPRIL 10 MG: 10 TABLET ORAL at 10:08

## 2023-12-15 RX ADMIN — SODIUM CHLORIDE, PRESERVATIVE FREE 10 ML: 5 INJECTION INTRAVENOUS at 22:10

## 2023-12-15 RX ADMIN — CARVEDILOL 6.25 MG: 6.25 TABLET, FILM COATED ORAL at 17:37

## 2023-12-15 RX ADMIN — DEXTROSE AND SODIUM CHLORIDE: 5; 900 INJECTION, SOLUTION INTRAVENOUS at 10:45

## 2023-12-15 RX ADMIN — SERTRALINE HYDROCHLORIDE 25 MG: 50 TABLET ORAL at 09:17

## 2023-12-15 RX ADMIN — BUSPIRONE HYDROCHLORIDE 30 MG: 15 TABLET ORAL at 09:17

## 2023-12-15 RX ADMIN — DEXTROSE AND SODIUM CHLORIDE: 5; 900 INJECTION, SOLUTION INTRAVENOUS at 00:58

## 2023-12-15 RX ADMIN — CARVEDILOL 6.25 MG: 6.25 TABLET, FILM COATED ORAL at 10:08

## 2023-12-15 RX ADMIN — Medication 3 MG: at 21:00

## 2023-12-15 ASSESSMENT — PAIN DESCRIPTION - LOCATION
LOCATION: NECK
LOCATION: LEG
LOCATION: NECK
LOCATION: NECK;BACK
LOCATION: BACK;NECK

## 2023-12-15 ASSESSMENT — PAIN SCALES - GENERAL
PAINLEVEL_OUTOF10: 5
PAINLEVEL_OUTOF10: 7
PAINLEVEL_OUTOF10: 0
PAINLEVEL_OUTOF10: 0
PAINLEVEL_OUTOF10: 5
PAINLEVEL_OUTOF10: 8
PAINLEVEL_OUTOF10: 9
PAINLEVEL_OUTOF10: 6
PAINLEVEL_OUTOF10: 5
PAINLEVEL_OUTOF10: 5
PAINLEVEL_OUTOF10: 7

## 2023-12-15 ASSESSMENT — PAIN DESCRIPTION - ORIENTATION
ORIENTATION: RIGHT;LEFT
ORIENTATION: MID;LOWER;UPPER
ORIENTATION: RIGHT;LEFT

## 2023-12-15 ASSESSMENT — PAIN - FUNCTIONAL ASSESSMENT
PAIN_FUNCTIONAL_ASSESSMENT: PREVENTS OR INTERFERES SOME ACTIVE ACTIVITIES AND ADLS
PAIN_FUNCTIONAL_ASSESSMENT: ACTIVITIES ARE NOT PREVENTED

## 2023-12-15 ASSESSMENT — PAIN SCALES - WONG BAKER
WONGBAKER_NUMERICALRESPONSE: 0

## 2023-12-15 ASSESSMENT — PAIN DESCRIPTION - DESCRIPTORS
DESCRIPTORS: DISCOMFORT
DESCRIPTORS: ACHING
DESCRIPTORS: DISCOMFORT

## 2023-12-15 NOTE — CONSULTS
narrowing is present. Impression  1. Severe spinal canal stenosis at C4-5 and C5-6, significantly worsened  compared with the previous exam.  2. Intramedullary signal abnormality within the cervical spinal cord at the  C5-6 level consistent with spondylotic myelopathy. 3. Severe multilevel degenerative changes, as described above with multilevel  neural foraminal narrowing, severe at multiple levels, similar to the  previous exam.      I personally reviewed all of the above medications, clinical laboratory, imaging and other diagnostic tests. Impression:      Acute hospital-acquired delirium; currently resolved  History of cervical myelopathy status post decompression  History of lumbar spine stenosis status post laminectomy  Chronic back pain, chronic pain syndrome  Elevated troponins    Plan:     Patient's mentation appears to be significantly improved today. I do not see any evidence of hallucinations, agitation or restlessness today. She is fully alert and oriented and memory is fully intact. We will add MRI brain without contrast for completeness to rule out any acute intracranial pathologies. There could be a toxic component from her medications, most recently Zoloft being started on 12/13. I would defer to psychiatry if this would need to be stopped due to possible side effects. In addition she is on both methadone and Norco at home which is an unusual combination, likely needs to be addressed by her pain management specialist as outpatient. She is also on baclofen which can cause CNS side effects. In addition metabolic workup seems to be unrevealing. Per report patient's daughter Estefania Mazariegos was very concerned regarding the change in mentation. I attempted to call her today to give an update with no answer. I did speak at length with the patient and her  at bedside.   If MRI brain is unrevealing and patient continues to do well she is stable to be discharge neurologically from my standpoint. Thank you for this very interesting consultation.       Electronically signed by Jeffy Viramontes DO on 12/15/2023 at 2:16 PM      Jeffy Viramontes [facility]

## 2023-12-15 NOTE — PROGRESS NOTES
[address]    Progress note            Date:   12/15/2023  Patient name:  Dari Carrillo  Date of admission:  12/12/2023  2:54 PM  MRN:   [id number]  Account:  [de-identified]  YOB: 1960  PCP:    Israel Campos MD  Room:   9074/9393-45  Code Status:    Full Code    Chief Complaint:     Chief Complaint   Patient presents with    Fatigue       History Obtained From:     patient, electronic medical record    History of Present Illness: The patient is a 59 y.o. Non- / non  female who presents with Fatigue   and she is admitted to the hospital for the management of fatigue, elevated troponin  Charlene Sulaiman is a 61-year-old ill-appearing, malnourished, pleasant lady who presents with difficulty swallowing, decreased energy, decreased appetite, and decreased motivation. She has a history of multiple spinal surgeries, cauda equina syndrome, breast cancer, GERD, hypertension, hypothyroidism, and osteoporosis. She works with an outpatient pain clinic for the management of severe back pain. She takes hydromorphone and hydrocodone. Her pain is not well-managed. She also has a history of alcohol abuse. The patient and her daughter state she drastically scaled back her alcohol use 4 years ago. She states she has a small drink twice a month. The patient reports a severely decreased appetite with difficulty swallowing. She reports extreme fatigue and difficulty ambulating in the home. Denies fever, chills, chest pain, cough, abdominal pain, nausea, diarrhea, and urinary symptoms. There are no aggravating or alleviating factors.     Patient, underwent CT abdomen pelvis, which was negative  Getting evaluated by speech pathologist      Past Medical History:     Past Medical History:   Diagnosis Date    Anxiety     Arthritis     At maximum risk for fall 12/29/2021    caudal equina syndrome and cervical stenosis    Breast cancer ([facility])     Cancer [facility])     right breast    Cauda equina syndrome ([address]) 12/29/2021    neuropathy, mobility difficulty, incontinance    Cervical stenosis of spine 12/29/2021    GERD (gastroesophageal reflux disease)     History of stomach ulcers     History of therapeutic radiation     Hx antineoplastic chemo     Hypertension     Dr. Alessandra Guerrero    Hypothyroidism     Lumbar neuritis     Post laminectomy syndrome     Spinal deformity 11/2021    cervical and lumbar    Thyroid disease     Uses wheelchair     Wears dentures     Wellness examination     Dr. Alessandra Guerrero        Past Surgical History:     Past Surgical History:   Procedure Laterality Date    BACK SURGERY      lower back x 3, cervical- x 1: C4- C6, 11/4/2014     BREAST SURGERY Right     mastectomy with reconstruction    CERVICAL FUSION N/A 4/12/2022    C5 CORPECTOMY, USE OF INTRAOPERATIVE CERVICAL TRACTION performed by Kaz White DO at [address] [facility] [address] 4/12/2022    POSTERIOR FIXATION C2-C7 performed by Kaz White DO at [facility]  about 2018    HERNIA REPAIR Bilateral     inguinal    HYSTERECTOMY, TOTAL ABDOMINAL (CERVIX REMOVED)      LUMBAR SPINE SURGERY N/A 12/30/2021    LUMBAR LAMINECTOMY L2-S1 performed by Kaz White DO at [address]  04/12/2022    C5 CORPECTOMY, USE OF INTRAOPERATIVE CERVICAL TRACTION (N/A Spine Cervical)         Medications Prior to Admission:     Prior to Admission medications    Medication Sig Start Date End Date Taking?  Authorizing Provider   baclofen (LIORESAL) 10 MG tablet Take 1 tablet by mouth 3 times daily as needed (spasm)   Yes Provider, MD Robert   levothyroxine (SYNTHROID) 100 MCG tablet TAKE ONE TABLET BY MOUTH DAILY 10/25/23   Israel Campos MD   dilTIAZem (CARDIZEM CD) 180 MG extended release capsule Take 1 capsule by mouth daily 10/11/23   Israel Campos MD   lisinopril (PRINIVIL;ZESTRIL) 5 MG tablet Take 1 tablet by mouth daily 10/11/23   Akhil Floyd

## 2023-12-15 NOTE — DISCHARGE INSTR - COC
Continuity of Care Form    Patient Name: Alexis Finn   :  1960  MRN:  [id number]    Admit date:  2023  Discharge date:  ***    Code Status Order: Full Code   Advance Directives:     Admitting Physician:  Robin Leal MD  PCP: Roselinda Closs, MD    Discharging Nurse: [facility] Unit/Room#: 9929/3040-51  Discharging Unit Phone Number: ***    Emergency Contact:   Extended Emergency Contact Information  Primary Emergency Contact: [address] [facility] Phone: [phone]  Mobile Phone: [phone]  Relation: Domestic Partner  Preferred language: [place]  Secondary Emergency Contact: [address] Phone: [phone]  Relation: Child    Past Surgical History:  Past Surgical History:   Procedure Laterality Date    BACK SURGERY      lower back x 3, cervical- x 1: C4- C6, 2014     BREAST SURGERY Right     mastectomy with reconstruction    CERVICAL FUSION N/A 2022    C5 CORPECTOMY, USE OF INTRAOPERATIVE CERVICAL TRACTION performed by Miguel Angel Abreu DO at [address] [facility] [address] 2022    POSTERIOR FIXATION C2-C7 performed by Miguel Angel Abreu DO at [address] [facility]  about 2018    HERNIA REPAIR Bilateral     inguinal    HYSTERECTOMY, TOTAL ABDOMINAL (CERVIX REMOVED)      LUMBAR SPINE SURGERY N/A 2021    LUMBAR LAMINECTOMY L2-S1 performed by Miguel Angel Abreu DO at [address] [facility], RADICAL      OTHER SURGICAL HISTORY  2022    C5 CORPECTOMY, USE OF INTRAOPERATIVE CERVICAL TRACTION (N/A Spine Cervical)        Immunization History:   Immunization History   Administered Date(s) Administered    COVID-19, MODERNA BLUE border, Primary or Immunocompromised, (age 12y+), IM, 100 mcg/0.5mL 2021, 2021, 12/15/2021    COVID-19, MODERNA Bivalent, (age 12y+), IM, 48 mcg/0.5 mL 10/12/2022    Influenza, FLUARIX, FLULAVAL, FLUZONE (age 10 mo+) AND AFLURIA, (age 1 y+), PF, 0.5mL 10/02/2018, 2019, 2020    Influenza, FLUCELVAX, (age 10 mo+), MDCK, Level: 5  Last Weight:   Wt Readings from Last 1 Encounters:   12/15/23 56.5 kg (124 lb 9 oz)     Mental Status:  {IP PT MENTAL STATUS:}    IV Access:  { MONICA IV ACCESS:273101430}    Nursing Mobility/ADLs:  Walking   {P DME MEIA:746370709}  Transfer  {P DME GTZY:293317142}  Bathing  {CHP DME FAD}  Dressing  {CHP DME YEBQ:362905613}  Toileting  {CHP DME TAXT:316144075}  Feeding  {P DME SAKJ:220780712}  Med Admin  {[facility] DME ZBCD:192543082}  Med Delivery   { MONICA MED Delivery:219781306}    Wound Care Documentation and Therapy:  Wound 22 Face Right (Active)   Number of days: 609       Wound 22 Face Left (Active)   Number of days: 609       Incision 21 Back Lower;Medial (Active)   Number of days: 714       Incision 22 Throat Right (Active)   Number of days: 611       Incision 22 Neck Posterior (Active)   Number of days: 611        Elimination:  Continence: Bowel: {YES / WM:85933}  Bladder: {YES / VT:55951}  Urinary Catheter: {Urinary Catheter:122581915}   Colostomy/Ileostomy/Ileal Conduit: {YES / YE:81789}       Date of Last BM: ***    Intake/Output Summary (Last 24 hours) at 12/15/2023 0808  Last data filed at 12/15/2023 0654  Gross per 24 hour   Intake 1300 ml   Output 900 ml   Net 400 ml     I/O last 3 completed shifts:   In: 1 [P.O.:340; I.V.:1200]  Out: 1400 [Urine:1400]    Safety Concerns:     [address] MONICA Safety Concerns:320172126}    Impairments/Disabilities:      { MONICA Impairments/Disabilities:211902463}    Nutrition Therapy:  Current Nutrition Therapy:   { MONICA Diet List:641349412}    Routes of Feeding: {[facility] DME Other Feedings:907429769}  Liquids: {Slp liquid thickness:30393}  Daily Fluid Restriction: {CHP DME Yes amt example:257626396}  Last Modified Barium Swallow with Video (Video Swallowing Test): {Done Not Done ZFBR:375680464}    Treatments at the Time of Hospital Discharge:   Respiratory Treatments: ***  Oxygen Therapy:  {Therapy; copd

## 2023-12-15 NOTE — PLAN OF CARE
Problem: Discharge Planning  Goal: Discharge to home or other facility with appropriate resources  12/14/2023 1956 by Sergio Flynn RN  Outcome: Progressing  12/14/2023 0603 by Faustino Kent RN  Outcome: Progressing     Problem: Pain  Goal: Verbalizes/displays adequate comfort level or baseline comfort level  12/14/2023 0603 by Faustino Kent RN  Outcome: Progressing     Problem: Skin/Tissue Integrity  Goal: Absence of new skin breakdown  Description: 1. Monitor for areas of redness and/or skin breakdown  2. Assess vascular access sites hourly  3. Every 4-6 hours minimum:  Change oxygen saturation probe site  4. Every 4-6 hours:  If on nasal continuous positive airway pressure, respiratory therapy assess nares and determine need for appliance change or resting period. 12/14/2023 0603 by Faustino Kent RN  Outcome: Progressing  Note: Skin integrity unchanged     Problem: ABCDS Injury Assessment  Goal: Absence of physical injury  12/14/2023 0603 by Faustino Kent RN  Outcome: Progressing     Problem: Safety - Adult  Goal: Free from fall injury  12/14/2023 1956 by Sergio Flynn RN  Outcome: Progressing  12/14/2023 0603 by Faustino Kent RN  Outcome: Progressing  Note: Bed alarm on, call light in reach.

## 2023-12-15 NOTE — PLAN OF CARE
Problem: Discharge Planning  Goal: Discharge to home or other facility with appropriate resources  12/15/2023 0018 by Zeus Valdivia RN  Outcome: Progressing  Flowsheets (Taken 12/14/2023 2100)  Discharge to home or other facility with appropriate resources: Identify barriers to discharge with patient and caregiver  Note: Not yet fit for discharge. Problem: Pain  Goal: Verbalizes/displays adequate comfort level or baseline comfort level  Outcome: Progressing  Flowsheets (Taken 12/15/2023 0018)  Verbalizes/displays adequate comfort level or baseline comfort level:   Encourage patient to monitor pain and request assistance   Administer analgesics based on type and severity of pain and evaluate response   Assess pain using appropriate pain scale     Problem: Skin/Tissue Integrity  Goal: Absence of new skin breakdown  Description: 1. Monitor for areas of redness and/or skin breakdown  2. Assess vascular access sites hourly  3. Every 4-6 hours minimum:  Change oxygen saturation probe site  4. Every 4-6 hours:  If on nasal continuous positive airway pressure, respiratory therapy assess nares and determine need for appliance change or resting period. Outcome: Progressing  Note: No new skin breakdown noted. Problem: ABCDS Injury Assessment  Goal: Absence of physical injury  Outcome: Progressing  Flowsheets (Taken 12/14/2023 2035)  Absence of Physical Injury: Implement safety measures based on patient assessment     Problem: Safety - Adult  Goal: Free from fall injury  12/15/2023 0018 by Zeus Valdivia RN  Outcome: Progressing  Flowsheets (Taken 12/14/2023 2035)  Free From Fall Injury: Instruct family/caregiver on patient safety  Note: Bed was locked and in lowest position. Raised side rails to ensure safety. Call light within reach. With sitter at bedside.      Problem: Skin/Tissue Integrity - Adult  Goal: Skin integrity remains intact  Outcome: Progressing  Flowsheets  Taken 12/14/2023 2100  Skin

## 2023-12-15 NOTE — TELEPHONE ENCOUNTER
Pt's daughter Shirley Wong calling to let you know [facility], but waiting on Neurology. Shirley Wong is going to fight it. Does not feel she is ready to go home due to pt Hallucinating. I let her know that you do not round anymore, but she said to advise you.

## 2023-12-15 NOTE — PLAN OF CARE
Problem: Discharge Planning  Goal: Discharge to home or other facility with appropriate resources  Outcome: Progressing  Flowsheets (Taken 12/15/2023 0255 by Diana Lo RN)  Discharge to home or other facility with appropriate resources: Identify barriers to discharge with patient and caregiver     Problem: Pain  Goal: Verbalizes/displays adequate comfort level or baseline comfort level  Outcome: Progressing     Problem: Skin/Tissue Integrity  Goal: Absence of new skin breakdown  Description: 1. Monitor for areas of redness and/or skin breakdown  2. Assess vascular access sites hourly  3. Every 4-6 hours minimum:  Change oxygen saturation probe site  4. Every 4-6 hours:  If on nasal continuous positive airway pressure, respiratory therapy assess nares and determine need for appliance change or resting period.   Outcome: Progressing     Problem: ABCDS Injury Assessment  Goal: Absence of physical injury  Outcome: Progressing     Problem: Safety - Adult  Goal: Free from fall injury  Outcome: Progressing     Problem: Skin/Tissue Integrity - Adult  Goal: Skin integrity remains intact  Outcome: Progressing  Flowsheets (Taken 12/15/2023 0255 by Diana Lo RN)  Skin Integrity Remains Intact: Monitor for areas of redness and/or skin breakdown     Problem: Musculoskeletal - Adult  Goal: Return mobility to safest level of function  Outcome: Progressing  Flowsheets (Taken 12/15/2023 0255 by Diana Lo RN)  Return Mobility to Safest Level of Function: Assess patient stability and activity tolerance for standing, transferring and ambulating with or without assistive devices     Problem: Gastrointestinal - Adult  Goal: Maintains adequate nutritional intake  Outcome: Progressing     Problem: Metabolic/Fluid and Electrolytes - Adult  Goal: Electrolytes maintained within normal limits  Outcome: Progressing  Flowsheets (Taken 12/15/2023 0255 by Diana Lo RN)  Electrolytes maintained within normal limits:   Monitor labs and assess patient for signs and symptoms of electrolyte imbalances   Administer electrolyte replacement as ordered

## 2023-12-15 NOTE — PROGRESS NOTES
Rehabilitation Physical Therapy    Date: 12/15/2023  Patient Name: Antwan Chew        MRN: [id number]    : 1960  (59 y.o.)  Gender: female   Referring Practitioner: Narciso FREGOSO CNP  Diagnosis: decreased appetitie       Discharge Recommendations:  Patient would benefit from continued therapy after discharge, Therapy recommended at discharge, Continue to assess pending progress  Equipment Needed: No    Assessment  Assessment  Activity Tolerance: Patient tolerated treatment well  Discharge Recommendations: Patient would benefit from continued therapy after discharge; Therapy recommended at discharge;Continue to assess pending progress    Plan  Physical Therapy Plan  General Plan:  (5-7 treatments/ week)  Specific Instructions for Next Treatment: advance transfers w/ 2 assist using olga lidia petit, instruct in exercise program, advance to gait as tolerated, co treat w/ OT  Current Treatment Recommendations: Strengthening, Home exercise program, Balance training, Gait training, Functional mobility training, Safety education & training, Transfer training, Patient/Caregiver education & training, Equipment evaluation, education, & procurement, Endurance training, Therapeutic activities, Co-Treatment     Restrictions  Restrictions/Precautions: Fall Risk, Up as Tolerated (IV left forearm, external urinary catheter)  Implants present? : Metal implants (cervical neck surgery)  Other position/activity restrictions: NO BP, IV sticks or venipunctures right arm    Subjective  Subjective  Subjective: Pt reports she is ready to work. Pt is smiling and cooperative with PT/OT. Co-treat with with [facility]. Pain: 8/10 bilateral LEs     Overall Cognitive Status: Exceptions  Arousal/Alertness: Appropriate responses to stimuli  Following Commands:  Follows multistep commands with repitition, Follows multistep commands with increased time  Attention Span: Attends with cues to redirect  Memory: Decreased recall of recent events  Safety needed climbing 3-5 steps with a railing?: Total  AM-PAC Inpatient Mobility Raw Score : 10  AM-PAC Inpatient T-Scale Score : 32.29  Mobility Inpatient CMS 0-100% Score: 76.75  Mobility Inpatient CMS G-Code Modifier : CL     PT Individual Minutes  Time In: 1033  Time Out: 1103  Minutes: 30    Electronically signed by Kayla Oliveros PTA on 12/15/23 at 1:20 PM EST

## 2023-12-15 NOTE — PROGRESS NOTES
Patient's daughter, grandson, and granddaughter at bedside. Daughter requesting to speak with RN in another room. We again discussed all concerns relating to patient's change in mentation.

## 2023-12-15 NOTE — PROGRESS NOTES
[facility]  In Patient Progress             Date:   12/15/2023  Patient name: Ana Ackerman  Date of admission:  12/12/2023  2:54 PM  MRN:   [id number]  YOB: 1960    Reason for Admission: Fatigue, elevated troponin    Subjective:  No cp  No sob  Echo as below- low risk.        Current Facility-Administered Medications:     LORazepam (ATIVAN) injection 0.5 mg, 0.5 mg, IntraVENous, Q6H PRN, Param Rojas MD, 0.5 mg at 12/14/23 1850    nicotine (NICODERM CQ) 7 MG/24HR 1 patch, 1 patch, TransDERmal, Daily, Param Andres MD, 1 patch at 12/14/23 2128    magnesium sulfate 2000 mg in water 50 mL IVPB, 2,000 mg, IntraVENous, PRN, Silvia Aaron MD, Stopped at 12/13/23 1230    carvedilol (COREG) tablet 3.125 mg, 3.125 mg, Oral, BID WC, Navid Pemberton, DO, 3.125 mg at 12/14/23 1752    sertraline (ZOLOFT) tablet 25 mg, 25 mg, Oral, Daily, Jamie Hill MD, 25 mg at 12/14/23 0834    baclofen (LIORESAL) tablet 10 mg, 10 mg, Oral, Once, Albin Wade MD    aspirin EC tablet 81 mg, 81 mg, Oral, Daily, Lasha Head APRN - NP, 81 mg at 12/14/23 0834    baclofen (LIORESAL) tablet 10 mg, 10 mg, Oral, TID PRN, Lasha Head APRN - NP    busPIRone (BUSPAR) tablet 30 mg, 30 mg, Oral, BID, Angelina Villasenor APRN - NP, 30 mg at 12/14/23 2121    dilTIAZem (CARDIZEM CD) extended release capsule 180 mg, 180 mg, Oral, Daily, Yisel Villasenor APRN - NP, 180 mg at 12/14/23 0834    HYDROcodone-acetaminophen (NORCO) 5-325 MG per tablet 1 tablet, 1 tablet, Oral, Q8H PRN, CASSANDRA Williamson - NP, 1 tablet at 12/13/23 1751    levothyroxine (SYNTHROID) tablet 100 mcg, 100 mcg, Oral, Daily, Lasha Head APRN - NP, 100 mcg at 12/15/23 0651    lisinopril (PRINIVIL;ZESTRIL) tablet 5 mg, 5 mg, Oral, Daily, CASSANDRA Williamson - NP, 5 mg at 12/14/23 7311    methadone (DOLOPHINE) tablet 10 mg, 10 mg, Oral, BID, CASSANDRA Williamson - NP, 10 mg at 12/14/23 2119    pantoprazole (PROTONIX) tablet 40 mg, 40 mg, 138* 114*       BNP: No results for input(s): \"BNP\" in the last 72 hours.  PT/INR: No results for input(s): \"PROTIME\", \"INR\" in the last 72 hours.  APTT:No results for input(s): \"APTT\" in the last 72 hours.  CARDIAC ENZYMES:  Recent Labs     12/12/23  1530 12/12/23  1710 12/13/23  0540   TROPHS 63* 59* 51*       FASTING LIPID PANEL:  Lab Results   Component Value Date/Time     07/23/2020 12:00 AM    LDLCALC 75 07/23/2020 12:00 AM    TRIG 96 07/23/2020 12:00 AM     LIVER PROFILE:  Recent Labs     12/12/23  1530   *   ALT 83*   LABALBU 4.2       12/12/23    ECHO (TTE) COMPLETE (PRN CONTRAST/BUBBLE/STRAIN/3D) 12/13/2023  5:16 PM (Final)    Interpretation Summary  Technically difficult study  The left ventricle appears normal in size, mildly increased LV wall thickness, no obvious wall motion abnormality noted  Normal LV systolic function, ejection fraction 50 to 55%  The right ventricle appears normal in size and function  The left and the right atrium appears normal in size  Aortic valve structure appears normal no stenosis no regurgitation  Mitral valve was not well-visualized, no obvious stenosis, trace regurgitation  Tricuspid valve structure appears normal, trace regurgitation, right ventricular systolic pressure 32 mmHg  Pulmonary valve was not well-visualized  Normal aortic root dimension  IVC not assessed  No Significant pericardial effusion seen    Signed by: Barbi Joseph MD on 12/13/2023  5:16 PM     IMPRESSION:      Elevated troponin, likely type II.  Not suggestive of acute coronary syndrome.  Fatigue, weight loss, lack of appetite.  Chronic pain syndrome.  Hypertension- not well controlled  Low risk Echo 12/23.     RECOMMENDATIONS:  Increase coreg  Increase lisinopril    Can follow up in 2-4 weeks for consideration of outpatient stress test    Discussed with patient and nursing.    Thank you for allowing me to participate in the care of this patient, please do not hesitate to call if you

## 2023-12-15 NOTE — PROGRESS NOTES
[facility] [place] OCCUPATIONAL THERAPY  PROGRESS NOTE  Date: 12/15/2023  Patient Name: Colton Lagunas       Room: 2638/0899-76  MRN: [id number]    : 1960  (59 y.o.)  Gender: female   Referring Practitioner: Edy Jaramillo MD  Diagnosis: Elevated troponin      Discharge Recommendations:  Further Occupational Therapy is recommended upon facility discharge. OT Equipment Recommendations  Other: TBD    Restrictions/Precautions  Restrictions/Precautions  Restrictions/Precautions: Fall Risk;Up as Tolerated  Required Braces or Orthoses?: No  Implants present? : Metal implants (cervical neck surgery)  Position Activity Restriction  Other position/activity restrictions: NO BP, IV sticks or venipunctures right arm    O2 Device: None (Room air)    Subjective  Subjective  Subjective: Pt reports she is ready to work. Pt is smiling and cooperative with PT/OT. Co-treat with with Catina Bergeron PTA  Pain: Pt reports 7/10 pain in neck radiating  Comments: DENA Aguilar'reji treatment, pt pleasant and cooperative to participate. Objective  Orientation  Overall Orientation Status: Within Functional Limits  Orientation Level: Oriented to place;Oriented to time;Oriented to situation;Oriented to person;Oriented X4  Cognition  Overall Cognitive Status: Exceptions  Arousal/Alertness: Appropriate responses to stimuli  Following Commands: Follows multistep commands with repitition; Follows multistep commands with increased time  Attention Span: Attends with cues to redirect  Memory: Decreased recall of recent events  Safety Judgement: Decreased awareness of need for assistance;Decreased awareness of need for safety  Problem Solving: Assistance required to generate solutions;Assistance required to implement solutions;Assistance required to identify errors made;Assistance required to correct errors made  Insights: Decreased awareness of deficits  Initiation: Requires cues for some  Sequencing:
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
## 2023-12-15 NOTE — PROGRESS NOTES
Comprehensive Nutrition Assessment    Type and Reason for Visit:  Reassess    Nutrition Recommendations/Plan:   Continue current diet. Discontinue frozen oral supplements. Start Glucerna strawberry 3x daily. Malnutrition Assessment:  Malnutrition Status: At risk for malnutrition (Comment) (12/13/23 9635)    Context:  Chronic Illness     Findings of the 6 clinical characteristics of malnutrition:  Energy Intake:  Mild decrease in energy intake (Comment)  Weight Loss:   (11% wt loss over 9 months)     Body Fat Loss:  Unable to assess     Muscle Mass Loss:  Unable to assess    Fluid Accumulation:  No significant fluid accumulation     Strength:  Not Performed    Nutrition Assessment:    Currently on Easy to chew with frozen oral supplements at lunch and dinner. Patient accepting diet well but consuming less than 50% at most meals. Wll change frozen supplements and high calorie high protein with each meal to aid intakes. Nutrition Related Findings:    Edema: +1 RUE. Bowel sounds active. Loss of appetite. Labs and meds reviewed Wound Type: None       Current Nutrition Intake & Therapies:    Average Meal Intake: 26-50%, 1-25%  Average Supplements Intake: 26-50%  ADULT ORAL NUTRITION SUPPLEMENT; Lunch, Dinner; Frozen Oral Supplement  ADULT DIET; Easy to Chew    Anthropometric Measures:  Height: 154.9 cm (5' 1\")  Ideal Body Weight (IBW): 105 lbs (48 kg)    Admission Body Weight: 52.6 kg (116 lb)  Current Body Weight: 56.5 kg (124 lb 9 oz),   IBW. Weight Source: Bed Scale  Current BMI (kg/m2): 23.5  Usual Body Weight: 62.1 kg (137 lb) (3/23)  % Weight Change (Calculated): -11.5                    BMI Categories: Normal Weight (BMI 18.5-24. 9)    Estimated Daily Nutrient Needs:  Energy Requirements Based On: Formula  Weight Used for Energy Requirements: Current  Energy (kcal/day): [place] x 1.3= 1400 kcal  Weight Used for Protein Requirements: Current  Protein (g/day): 1.5g/kg= 80 g protein  Method Used for

## 2023-12-15 NOTE — PROGRESS NOTES
Department of Psychiatry  Consult Progress Note  12/15/2023    Patient Name: Dari Carrillo    Reason for initial consult:  Anxiety, depression          Interval History:      No acute psychiatric events overnight. Patient reports that she slept fine and that her current mood is \"OK\". Despite this she is very sleepy and had to be awakened/redirected. She denies any auditory or visual hallucinations. She denies feeling down or depressed, although she has also not really gotten out of bed today. Her  states that yesterday afternoon, she had a significant episode of anxiety lasting ~2 hours in length. HISTORY OF PRESENT ILLNESS ON ADMISSION:    The patient is a 59 y.o. female with significant past psychiatric history of depression who presents with fatigue. The patient has a medical history significant for multiple spinal surgeries, cauda equina syndrome, breast cancer, GERD, HTN, hypothyroidism, osteoporosis and severe back pain. She is on long-term opioid therapy. She has a history of alcohol abuse. She has elevated troponins. The patient sodium was normal on presentation but she is hyponatremic and hypokalemic at this time. Her TSH is normal.  Her urine drug screen not been obtained. Her liver enzymes are elevated. -Seen at bedside.  present. Has some depression and anxiety.   -In the last couple of days she had difficulty using the walker.   -Last drink was over 2 days ago per patient.   -Had quit taking Buspirone because of lack of efficacy. The patient is not currently receiving care for the above psychiatric illness. Psychiatric Review of Systems           Obsessions and Compulsions: Denies       Harper or Hypomania: Denies     Hallucinations: Denies     Panic Attacks:  Denies     Delusions:  Denies     Phobias:  Denies     Trauma: Denies        Substance Abuse History:  ETOH: History of heavy drinking.    Marijuana: occasional  Opiates: Denies abusing opiates but is Active Problem List   Diagnosis    Stenosis of cervical spine with myelopathy (HCC)    Uncontrolled hypertension    Hypothyroidism    Lumbar radiculopathy, chronic    Lumbar neuritis    Post laminectomy syndrome    Hypokalemia    Pain of right hip joint    Post-menopausal osteoporosis    Chronic gastritis without bleeding    Elevated CEA    Esophageal dysphagia    Hypotension due to drugs    Ulcerative esophagitis    Weight loss, abnormal    Cervical myelopathy (HCC)    Lumbar stenosis with neurogenic claudication    Spinal deformity    Anxiety about health    Cauda equina compression (HCC)    Anemia, normocytic normochromic    Iron deficiency    Pseudomonas urinary tract infection    Hypocalcemia    Cauda equina syndrome (HCC)    Hemiplegia (HCC)    Back pain    Incomplete quadriplegia at C5-6 level (HCC)    [place] neck deformity of cervical spine    Acute cystitis without hematuria    Spinal cord injury, cervical region, sequela (HCC)    Elevated troponin    Fatigue    Decreased appetite    NSTEMI (non-ST elevated myocardial infarction) (HCC)    Depression    Diastolic heart failure (HCC)      RISK ASSESSMENT: low risk of suicide or harm to others  Risk Management:  routine: no special precautions necessary    PLAN  No Medication Changes Today  - Continue buspirone, Zoloft  -     - Strongly recommend referral to outpatient psychiatric resources upon discharge as patient does not currently have follow-up and will need this for continued care and management of Zoloft.  Additional recommendations will follow the clinical course.     Thank you very much for allowing us to participate in the care of this patient.    Electronically signed by Melecio Saldaña MD on 12/15/2023 at 1:50 PM     **This report has been created using voice recognition software. It may contain minor errors which are inherent in voice recognition technology.**  I independently saw and evaluated the patient.  I reviewed the  documentation by the  CNP    .  Any additional comments or changes to the   documentation are stated below otherwise agree with assessment. The patient was seen face-to-face. She continues to be anxious. She has found little benefit with medications though overall she feels better since she came in. At this time the buspirone needs time to work and her psychotropic medications are unlikely to work if she continues drinking. Note binges have been made to her medications today. PLAN  Medications as noted above  Attempt to develop insight  Psycho-education conducted. Supportive Therapy conducted.   Follow-up daily while on inpatient unit    Electronically signed by aDnte Coreas MD on 12/15/23 at 6:42 PM EST

## 2023-12-15 NOTE — CARE COORDINATION
Writer placed call to pt partner [place] Ana regarding PT/OT recommendations for therapy upon discharge. [place] Ana states he is off work until January and would like to take this pt home. Writer explained pt is requiring a maximum 2 assist for transfers per PT/OT notes. Guillermo Franciscas is understanding of this and states he has a grandson that would be willing to help him. Guillermo Perez states they have a VNS list and if someone could help them during the week this would be ideal.    Guillermo Perez states he is going to speak to pt child Tanna Hdz and her son that would be helping with the pt. Guillermo Perez states he will need a few hours. Writer confirmed call will be placed back to verify decision so referrals can be sent if necessary. Electronically signed by SURINDER Street on 12/15/2023 at 12:48 PM    Writer placed call back to Guillermo Perez. No answer, voicemail left. Writer placed call to pt daughter Tanna Hdz. No answer, voicemail left.   Electronically signed by SURINDER Street on 12/15/2023 at 3:47 PM

## 2023-12-16 LAB
ALBUMIN SERPL-MCNC: 3 G/DL (ref 3.5–5.2)
ALP SERPL-CCNC: 101 U/L (ref 35–104)
ALT SERPL-CCNC: 29 U/L (ref 5–33)
ANION GAP SERPL CALCULATED.3IONS-SCNC: 8 MMOL/L (ref 9–17)
AST SERPL-CCNC: 41 U/L
BASOPHILS # BLD: 0.04 K/UL (ref 0–0.2)
BASOPHILS NFR BLD: 1 % (ref 0–2)
BILIRUB SERPL-MCNC: 0.8 MG/DL (ref 0.3–1.2)
BUN SERPL-MCNC: 3 MG/DL (ref 8–23)
CALCIUM SERPL-MCNC: 8.3 MG/DL (ref 8.6–10.4)
CHLORIDE SERPL-SCNC: 94 MMOL/L (ref 98–107)
CO2 SERPL-SCNC: 25 MMOL/L (ref 20–31)
CREAT SERPL-MCNC: 0.4 MG/DL (ref 0.5–0.9)
EOSINOPHIL # BLD: 0.07 K/UL (ref 0–0.4)
EOSINOPHILS RELATIVE PERCENT: 2 % (ref 0–4)
ERYTHROCYTE [DISTWIDTH] IN BLOOD BY AUTOMATED COUNT: 14 % (ref 11.5–14.9)
GFR SERPL CREATININE-BSD FRML MDRD: >60 ML/MIN/1.73M2
GLUCOSE BLD-MCNC: 117 MG/DL (ref 65–105)
GLUCOSE BLD-MCNC: 120 MG/DL (ref 65–105)
GLUCOSE BLD-MCNC: 123 MG/DL (ref 65–105)
GLUCOSE BLD-MCNC: 128 MG/DL (ref 65–105)
GLUCOSE SERPL-MCNC: 121 MG/DL (ref 70–99)
HCT VFR BLD AUTO: 28.3 % (ref 36–46)
HGB BLD-MCNC: 9.2 G/DL (ref 12–16)
LYMPHOCYTES NFR BLD: 0.67 K/UL (ref 1–4.8)
LYMPHOCYTES RELATIVE PERCENT: 19 % (ref 24–44)
MCH RBC QN AUTO: 37.8 PG (ref 26–34)
MCHC RBC AUTO-ENTMCNC: 32.7 G/DL (ref 31–37)
MCV RBC AUTO: 115.6 FL (ref 80–100)
MONOCYTES NFR BLD: 0.28 K/UL (ref 0.1–1.3)
MONOCYTES NFR BLD: 8 % (ref 1–7)
MORPHOLOGY: ABNORMAL
NEUTROPHILS NFR BLD: 70 % (ref 36–66)
NEUTS SEG NFR BLD: 2.44 K/UL (ref 1.3–9.1)
PLATELET # BLD AUTO: 94 K/UL (ref 150–450)
PMV BLD AUTO: 9.1 FL (ref 6–12)
POTASSIUM SERPL-SCNC: 3.5 MMOL/L (ref 3.7–5.3)
PROT SERPL-MCNC: 6.1 G/DL (ref 6.4–8.3)
RBC # BLD AUTO: 2.45 M/UL (ref 4–5.2)
SODIUM SERPL-SCNC: 127 MMOL/L (ref 135–144)
WBC OTHER # BLD: 3.5 K/UL (ref 3.5–11)

## 2023-12-16 PROCEDURE — 97116 GAIT TRAINING THERAPY: CPT

## 2023-12-16 PROCEDURE — 85025 COMPLETE CBC W/AUTO DIFF WBC: CPT

## 2023-12-16 PROCEDURE — 2580000003 HC RX 258: Performed by: NURSE PRACTITIONER

## 2023-12-16 PROCEDURE — 99232 SBSQ HOSP IP/OBS MODERATE 35: CPT | Performed by: PSYCHIATRY & NEUROLOGY

## 2023-12-16 PROCEDURE — 99232 SBSQ HOSP IP/OBS MODERATE 35: CPT | Performed by: INTERNAL MEDICINE

## 2023-12-16 PROCEDURE — 6360000002 HC RX W HCPCS: Performed by: NURSE PRACTITIONER

## 2023-12-16 PROCEDURE — 2060000000 HC ICU INTERMEDIATE R&B

## 2023-12-16 PROCEDURE — 99233 SBSQ HOSP IP/OBS HIGH 50: CPT | Performed by: INTERNAL MEDICINE

## 2023-12-16 PROCEDURE — 36415 COLL VENOUS BLD VENIPUNCTURE: CPT

## 2023-12-16 PROCEDURE — 92526 ORAL FUNCTION THERAPY: CPT

## 2023-12-16 PROCEDURE — 80053 COMPREHEN METABOLIC PANEL: CPT

## 2023-12-16 PROCEDURE — 97110 THERAPEUTIC EXERCISES: CPT

## 2023-12-16 PROCEDURE — 82947 ASSAY GLUCOSE BLOOD QUANT: CPT

## 2023-12-16 PROCEDURE — 6370000000 HC RX 637 (ALT 250 FOR IP): Performed by: INTERNAL MEDICINE

## 2023-12-16 PROCEDURE — 6370000000 HC RX 637 (ALT 250 FOR IP): Performed by: NURSE PRACTITIONER

## 2023-12-16 RX ADMIN — CARVEDILOL 6.25 MG: 6.25 TABLET, FILM COATED ORAL at 09:23

## 2023-12-16 RX ADMIN — HYDROCODONE BITARTRATE AND ACETAMINOPHEN 1 TABLET: 5; 325 TABLET ORAL at 06:10

## 2023-12-16 RX ADMIN — BUSPIRONE HYDROCHLORIDE 30 MG: 15 TABLET ORAL at 21:04

## 2023-12-16 RX ADMIN — PANTOPRAZOLE SODIUM 40 MG: 40 TABLET, DELAYED RELEASE ORAL at 09:23

## 2023-12-16 RX ADMIN — Medication 3 MG: at 21:04

## 2023-12-16 RX ADMIN — METHADONE HYDROCHLORIDE 10 MG: 10 TABLET ORAL at 09:22

## 2023-12-16 RX ADMIN — ASPIRIN 81 MG: 81 TABLET, COATED ORAL at 09:23

## 2023-12-16 RX ADMIN — METHADONE HYDROCHLORIDE 10 MG: 10 TABLET ORAL at 21:04

## 2023-12-16 RX ADMIN — SODIUM CHLORIDE, PRESERVATIVE FREE 10 ML: 5 INJECTION INTRAVENOUS at 21:04

## 2023-12-16 RX ADMIN — DEXTROSE AND SODIUM CHLORIDE: 5; 900 INJECTION, SOLUTION INTRAVENOUS at 07:27

## 2023-12-16 RX ADMIN — CARVEDILOL 6.25 MG: 6.25 TABLET, FILM COATED ORAL at 15:50

## 2023-12-16 RX ADMIN — BUSPIRONE HYDROCHLORIDE 30 MG: 15 TABLET ORAL at 09:23

## 2023-12-16 RX ADMIN — DEXTROSE AND SODIUM CHLORIDE: 5; 900 INJECTION, SOLUTION INTRAVENOUS at 17:31

## 2023-12-16 RX ADMIN — POTASSIUM BICARBONATE 40 MEQ: 782 TABLET, EFFERVESCENT ORAL at 15:49

## 2023-12-16 RX ADMIN — DILTIAZEM HYDROCHLORIDE 180 MG: 180 CAPSULE, COATED, EXTENDED RELEASE ORAL at 09:23

## 2023-12-16 RX ADMIN — ENOXAPARIN SODIUM 40 MG: 100 INJECTION SUBCUTANEOUS at 09:23

## 2023-12-16 RX ADMIN — LISINOPRIL 10 MG: 10 TABLET ORAL at 09:23

## 2023-12-16 RX ADMIN — LEVOTHYROXINE SODIUM 100 MCG: 0.1 TABLET ORAL at 06:06

## 2023-12-16 ASSESSMENT — PAIN DESCRIPTION - DESCRIPTORS
DESCRIPTORS: ACHING
DESCRIPTORS: ACHING

## 2023-12-16 ASSESSMENT — PAIN SCALES - GENERAL
PAINLEVEL_OUTOF10: 0
PAINLEVEL_OUTOF10: 9
PAINLEVEL_OUTOF10: 5

## 2023-12-16 ASSESSMENT — PAIN DESCRIPTION - LOCATION
LOCATION: OTHER (COMMENT)
LOCATION: BACK

## 2023-12-16 ASSESSMENT — PAIN - FUNCTIONAL ASSESSMENT: PAIN_FUNCTIONAL_ASSESSMENT: ACTIVITIES ARE NOT PREVENTED

## 2023-12-16 NOTE — PROGRESS NOTES
[facility]   IN-PATIENT SERVICE      NEUROLOGY PROGRESS  NOTE            Date:   12/16/2023  Patient name:  Lynn Crisostomo  Date of admission:  12/12/2023  YOB: 1960      Interval History:     Doing well, no current complaints. No further episodes of agitation or confusion. MRI brain no acute abnormalities. , daughter at bedside. History of Present Illness: The patient is a 59 y.o. female who presents with Fatigue  . The patient was seen and examined and the chart was reviewed. Presents to the hospital 12/12 with complaints of fatigue, generalized weakness, decreased energy. Found to have elevated troponins, admitted for further workup. She has history of multiple spine surgeries and chronic back pain on Norco and methadone at home. She has been evaluated by cardiology for elevated troponin suggestive of type II and no acute coronary syndrome. Blood pressure meds modified, 2D echo was checked; EF 50 to 55%, LA, RA normal size. Psychiatry also consulted due to anxiety and depression. It is reported that she had been on BuSpar in the past but did not work very well for her. She has history of chronic alcoholism but is no longer daily drinker. She was started on Zoloft on 12/13. It was then noted on early morning of 12/14 that patient started to have some hallucinations, agitation and disorientation. Telesitter needed to be initiated. Patient was also given Ativan 0.5 mg around 6 PM.  Discharge plans were held due to this and neurology consulted. As of this morning 12/15 it seems that patient is much improved. She is seen around midday by consultant and is awake alert and oriented x 3 following all commands. Her  is at bedside in agreement that she is significantly improved today. Continues to have one-to-one sitter. She has not required any Ativan today. .  Patient was seen recently in October outpatient visit with neurosurgery Dr. Graciela Campbell.   At that There is no significant  spinal canal stenosis or neural foraminal narrowing present. C3-C4: There is a broad posterior disc osteophyte complex, uncovertebral  overgrowth and facet arthropathy. There is moderate-to-severe spinal canal  stenosis, severe left and moderate right neural foraminal narrowing, not  significantly changed. C4-C5: The sequelae of multilevel laminectomies is noted. There is grade 1  anterolisthesis, disc bulge and uncovertebral overgrowth. There is worsened  severe spinal canal stenosis, severe left and moderate right neural foraminal  narrowing. C5-C6: The sequelae of bilateral laminectomies is noted. There is a  posterior disc osteophyte complex and uncovertebral overgrowth. There is  severe spinal canal stenosis, significantly worsened from the previous exam.  Severe bilateral neural foraminal narrowing is present. C6-C7: There is a broad posterior disc osteophyte complex and uncovertebral  overgrowth. There is mild spinal canal stenosis and severe bilateral neural  foraminal narrowing. C7-T1: There is a disc bulge and uncovertebral overgrowth. There is moderate  right neural foraminal narrowing. No significant spinal canal stenosis or  left neural foraminal narrowing is present. Impression  1. Severe spinal canal stenosis at C4-5 and C5-6, significantly worsened  compared with the previous exam.  2. Intramedullary signal abnormality within the cervical spinal cord at the  C5-6 level consistent with spondylotic myelopathy.   3. Severe multilevel degenerative changes, as described above with multilevel  neural foraminal narrowing, severe at multiple levels, similar to the  previous exam.      MRI BRAIN WO CONTRAST    Narrative  EXAMINATION:  MRI OF THE BRAIN WITHOUT CONTRAST  12/15/2023 4:57 pm    TECHNIQUE:  Multiplanar multisequence MRI of the brain was performed without the  administration of intravenous contrast.    COMPARISON:  None    HISTORY:  ORDERING SYSTEM PROVIDED HISTORY: acute change in mentation  TECHNOLOGIST PROVIDED HISTORY:  acute change in mentation  Reason for Exam: Acute change in mentation, fatigue    FINDINGS:  INTRACRANIAL STRUCTURES/VENTRICLES: There is mild parenchymal volume loss.  There is mild T2/FLAIR hyperintensity in the periventricular and subcortical  white matter, likely related to mild chronic microvascular disease.  There is  no acute infarct. No mass effect or midline shift. No evidence of an acute  intracranial hemorrhage. There is no evidence of hydrocephalus. The  sellar/suprasellar regions appear unremarkable.  The normal signal voids  within the major intracranial vessels appear maintained.    ORBITS: The visualized portion of the orbits demonstrate no acute abnormality.    SINUSES: There is scattered minimal mucosal thickening in the paranasal  sinuses.  There is minimal right mastoid effusion.    BONES/SOFT TISSUES: The bone marrow signal intensity appears normal. The soft  tissues demonstrate no acute abnormality.    Impression  No acute intracranial abnormality.    Mild parenchymal volume loss.    Mild chronic microvascular disease.        I personally reviewed all of the above medications, clinical laboratory, imaging and other diagnostic tests.         Impression:      Acute hospital-acquired delirium; currently resolved  History of cervical myelopathy status post decompression  History of lumbar spine stenosis status post laminectomy  Chronic back pain, chronic pain syndrome  Elevated troponins    Plan:     Patient continues to do well neurologically.  Family at bedside in agreement that patient would do best going to rehab upon discharge.  It appears to be that this is the plan at this time.  Discussed importance of different methods for upkeep of good brain health and slowing down progression of cognitive impairment.  They report she sits at home all day every day.  I did explain that moving around more, participating in

## 2023-12-16 NOTE — PLAN OF CARE
Problem: Discharge Planning  Goal: Discharge to home or other facility with appropriate resources  Outcome: Progressing  Flowsheets (Taken 12/16/2023 6398)  Discharge to home or other facility with appropriate resources: Refer to discharge planning if patient needs post-hospital services based on physician order or complex needs related to functional status, cognitive ability or social support system  Note: ARU assessment     Problem: Safety - Adult  Goal: Free from fall injury  Outcome: Progressing

## 2023-12-16 NOTE — PROGRESS NOTES
Physical Therapy  Facility/Department: [facility] PROGRESSIVE CARE  Daily Treatment Note  NAME: Rajani Iglesias  : 1960  MRN: [id number]    Date of Service: 2023    Discharge Recommendations:  Patient would benefit from continued therapy after discharge, Therapy recommended at discharge, Continue to assess pending progress        Patient Diagnosis(es): The primary encounter diagnosis was Fatigue, unspecified type. Diagnoses of Lack of motivation, Decreased appetite, and Elevated troponin were also pertinent to this visit.    Activity Tolerance: Patient limited by endurance;Patient limited by fatigue     Plan    Physical Therapy Plan  Specific Instructions for Next Treatment: advance transfers w/ 2 assist using olga lidia petit, instruct in exercise program, advance to gait as tolerated, co treat w/ OT  Current Treatment Recommendations: Strengthening;Home exercise program;Balance training;Gait training;Functional mobility training;Safety education & training;Transfer training;Patient/Caregiver education & training;Equipment evaluation, education, & procurement;Endurance training;Therapeutic activities;Co-Treatment     Restrictions  Restrictions/Precautions  Restrictions/Precautions: Fall Risk, Up as Tolerated  Required Braces or Orthoses?: No  Implants present? : Metal implants (cervical neck surgery)  Position Activity Restriction  Other position/activity restrictions: NO BP, IV sticks or venipunctures right arm     Subjective    Pt pleasant and agreeable to PT  Pain: Pt reports 7/10 pain in neck radiating    Cognition  Overall Cognitive Status: Exceptions  Arousal/Alertness: Appropriate responses to stimuli  Following Commands: Follows multistep commands with repitition;Follows multistep commands with increased time  Attention Span: Attends with cues to redirect  Memory: Decreased recall of recent events  Safety Judgement: Decreased awareness of need for assistance;Decreased awareness of need for safety  Problem  Solving: Assistance required to generate solutions;Assistance required to implement solutions;Assistance required to identify errors made;Assistance required to correct errors made  Insights: Decreased awareness of deficits  Initiation: Requires cues for some  Sequencing: Requires cues for some  Cognition Comment: pt requires frequent verbal cues for safety, and can be impulsive at times attempting to stand without readiness cues provided from therapists. Objective   Bed Mobility Training  Supine to Sit: Stand-by assistance (use of bedrail)  Sit to Supine: Contact-guard assistance  Scooting: Contact-guard assistance (c effort)    Balance  Sitting: Impaired  Sitting - Static: Fair (occasional)  Standing: Impaired  Standing - Static: Fair  Standing - Dynamic: Fair    Transfer Training  Sit to Stand: Minimum assistance (losing balance posterior, education for weight shifting.)  Stand to Sit: Minimum assistance    Gait Training  Overall Level of Assistance: Moderate assistance  Distance (ft): 6 Feet x 2 reps  Assistive Device: Walker, rolling;Gait belt  Interventions: Safety awareness training; Tactile cues (IV, poor insight to deficits)  Base of Support: Narrowed  Speed/Sharon: Shuffled; Slow  Step Length: Left shortened;Right shortened  Gait Abnormalities: Decreased step clearance       PT Exercises  Static Standing Balance Exercises: Static standing @ walker 1'10\" MIN A for balance. Dynamic Standing Balance Exercises: Pt stood @ walker to work on pregait activity, including weight shifting side to side c MIN A for balance ~ 1 minute.              Goals  Short Term Goals  Time Frame for Short Term Goals: 5-7 treatments/ week  Short Term Goal 1: pt to tolerate 1/2 hour of therapuetic exercise and activity  Short Term Goal 2: pt to demonstrate good technique for exercise program for general strengthening, balance activities and energy conservation  Short Term Goal 3: pt to demonstrate bed mobility w/ supervision for

## 2023-12-16 NOTE — PROGRESS NOTES
[address]    Progress note            Date:   12/16/2023  Patient name:  Sudha Rangel  Date of admission:  12/12/2023  2:54 PM  MRN:   [id number]  Account:  [de-identified]  YOB: 1960  PCP:    Gabriel Lutz MD  Room:   7219/5633-29  Code Status:    Full Code    Chief Complaint:     Chief Complaint   Patient presents with    Fatigue       History Obtained From:     patient, electronic medical record    History of Present Illness: The patient is a 59 y.o. Non- / non  female who presents with Fatigue   and she is admitted to the hospital for the management of fatigue, elevated troponin  Estefanía El is a 71-year-old ill-appearing, malnourished, pleasant lady who presents with difficulty swallowing, decreased energy, decreased appetite, and decreased motivation. She has a history of multiple spinal surgeries, cauda equina syndrome, breast cancer, GERD, hypertension, hypothyroidism, and osteoporosis. She works with an outpatient pain clinic for the management of severe back pain. She takes hydromorphone and hydrocodone. Her pain is not well-managed. She also has a history of alcohol abuse. The patient and her daughter state she drastically scaled back her alcohol use 4 years ago. She states she has a small drink twice a month. The patient reports a severely decreased appetite with difficulty swallowing. She reports extreme fatigue and difficulty ambulating in the home. Denies fever, chills, chest pain, cough, abdominal pain, nausea, diarrhea, and urinary symptoms. There are no aggravating or alleviating factors.     Patient, underwent CT abdomen pelvis, which was negative  Getting evaluated by speech pathologist      Past Medical History:     Past Medical History:   Diagnosis Date    Anxiety     Arthritis     At maximum risk for fall 12/29/2021    caudal equina syndrome and cervical stenosis    Breast cancer ([address] [facility])     Cancer

## 2023-12-16 NOTE — PLAN OF CARE
Problem: Discharge Planning  Goal: Discharge to home or other facility with appropriate resources  12/16/2023 0027 by Aneudy Sterling RN  Outcome: Progressing  Flowsheets (Taken 12/15/2023 0255 by Maru Solorzano RN)  Discharge to home or other facility with appropriate resources: Identify barriers to discharge with patient and caregiver  12/15/2023 1548 by Mouna Berg RN  Outcome: Progressing  Flowsheets (Taken 12/15/2023 0255 by Maru Solorzano RN)  Discharge to home or other facility with appropriate resources: Identify barriers to discharge with patient and caregiver     Problem: Pain  Goal: Verbalizes/displays adequate comfort level or baseline comfort level  12/16/2023 0027 by Aneudy Sterling RN  Outcome: Progressing  Flowsheets (Taken 12/15/2023 0018 by Maru Solorzano RN)  Verbalizes/displays adequate comfort level or baseline comfort level:   Encourage patient to monitor pain and request assistance   Administer analgesics based on type and severity of pain and evaluate response   Assess pain using appropriate pain scale  12/15/2023 1548 by Mouna Berg RN  Outcome: Progressing     Problem: Skin/Tissue Integrity  Goal: Absence of new skin breakdown  Description: 1. Monitor for areas of redness and/or skin breakdown  2. Assess vascular access sites hourly  3. Every 4-6 hours minimum:  Change oxygen saturation probe site  4. Every 4-6 hours:  If on nasal continuous positive airway pressure, respiratory therapy assess nares and determine need for appliance change or resting period. 12/16/2023 0027 by Aneudy Sterling RN  Outcome: Progressing  Note: Skin kept clean and dry, assessed per protocol.   12/15/2023 1548 by Mouna Berg RN  Outcome: Progressing     Problem: ABCDS Injury Assessment  Goal: Absence of physical injury  12/16/2023 0027 by Aneudy Sterling RN  Outcome: Progressing  Flowsheets (Taken 12/14/2023 2035 by Maru Solorzano RN)  Absence of Physical Injury: Implement

## 2023-12-16 NOTE — PROGRESS NOTES
[facility]  In Patient Progress             Date:   12/16/2023  Patient name: Ras Smith  Date of admission:  12/12/2023  2:54 PM  MRN:   [id number]  YOB: 1960    Reason for Admission: Fatigue, elevated troponin    Subjective:  No cp  No sob  Echo as below- low risk.        Current Facility-Administered Medications:     carvedilol (COREG) tablet 6.25 mg, 6.25 mg, Oral, BID WC, Nilay, Navid, DO, 6.25 mg at 12/16/23 0923    lisinopril (PRINIVIL;ZESTRIL) tablet 10 mg, 10 mg, Oral, Daily, Nilay, Navid, DO, 10 mg at 12/16/23 0657    [Held by provider] LORazepam (ATIVAN) injection 0.5 mg, 0.5 mg, IntraVENous, Q6H PRN, Param Dallas MD, 0.5 mg at 12/14/23 1850    nicotine (NICODERM CQ) 7 MG/24HR 1 patch, 1 patch, TransDERmal, Daily, Walt Nageotte, MD, 1 patch at 12/16/23 5657    magnesium sulfate 2000 mg in water 50 mL IVPB, 2,000 mg, IntraVENous, PRN, Walt Nageotte, MD, Stopped at 12/13/23 1230    sertraline (ZOLOFT) tablet 25 mg, 25 mg, Oral, Daily, Deann Beyer MD, 25 mg at 12/15/23 0917    baclofen (LIORESAL) tablet 10 mg, 10 mg, Oral, Once, Willow Rockwell MD    aspirin EC tablet 81 mg, 81 mg, Oral, Daily, CASSANDRA Ruiz - NP, 81 mg at 12/16/23 2894    baclofen (LIORESAL) tablet 10 mg, 10 mg, Oral, TID PRN, CASSANDRA Ruiz NP    busPIRone (BUSPAR) tablet 30 mg, 30 mg, Oral, BID, Angelina Villasenor APRN - NP, 30 mg at 12/16/23 0923    dilTIAZem (CARDIZEM CD) extended release capsule 180 mg, 180 mg, Oral, Daily, Yisel Villasenor APRN - NP, 180 mg at 12/16/23 3523    [Held by provider] HYDROcodone-acetaminophen (NORCO) 5-325 MG per tablet 1 tablet, 1 tablet, Oral, Q8H PRN, CASSANDRA Ruiz NP, 1 tablet at 12/16/23 3820    levothyroxine (SYNTHROID) tablet 100 mcg, 100 mcg, Oral, Daily, CASSANDRA Ruiz NP, 100 mcg at 12/16/23 0606    methadone (DOLOPHINE) tablet 10 mg, 10 mg, Oral, BID, CASSANDRA Ruiz NP, 10 mg at 12/16/23 0922    pantoprazole  glucagon injection 1 mg, 1 mg, SubCUTAneous, PRN, Angelina Villasenor APRN - NP    dextrose 10 % infusion, , IntraVENous, Continuous PRN, Angelina Villasenor APRN - NP   PHYSICAL EXAM:    Physical Examination:    BP (!) 142/88   Pulse 88   Temp 98.5 °F (36.9 °C) (Oral)   Resp 16   Ht 1.549 m (5' 1\")   Wt 56.5 kg (124 lb 9 oz)   SpO2 98%   BMI 23.54 kg/m²    Constitutional and General Appearance: alert, cooperative, no distress and appears stated age  HEENT: PERRL, no cervical lymphadenopathy. No masses palpable. Normal oral mucosa  Respiratory:  Normal excursion and expansion without use of accessory muscles  Resp Auscultation: Good respiratory effort. No for increased work of breathing. On auscultation: clear  Cardiovascular:  Heart tones are crisp and normal. regular S1 and S2. Murmurs: none  Jugular venous pulsation Normal  Abdomen:  No masses or tenderness  Bowel sounds present  Extremities:   No Cyanosis or Clubbing   Lower extremity edema: none   Skin: Warm and dry  Neurological:  Alert and oriented.  Moves all extremities well  No abnormalities of mood, affect, memory, mentation, or behavior are noted    DATA:    Diagnostics:      EKG:   Results for orders placed or performed during the hospital encounter of 12/12/23   EKG 12 Lead   Result Value Ref Range    Ventricular Rate 124 BPM    Atrial Rate 124 BPM    P-R Interval 134 ms    QRS Duration 80 ms    Q-T Interval 344 ms    QTc Calculation (Bazett) 494 ms    P Axis 60 degrees    R Axis 30 degrees    T Axis 36 degrees    Narrative    Sinus tachycardia with Premature atrial complexes  Nonspecific ST and T wave abnormality  Abnormal ECG  No previous ECGs available         Labs:     CBC:   No results for input(s): \"WBC\", \"HGB\", \"HCT\", \"PLT\" in the last 72 hours.    BMP:   Recent Labs     12/15/23  0536 12/16/23  1119   * 127*   K 3.2* 3.5*   CO2 27 25   BUN 5* 3*   CREATININE <0.4* 0.4*   LABGLOM Can not be calculated >60   GLUCOSE 114* 121*       BNP:  No results for input(s): \"BNP\" in the last 72 hours. PT/INR: No results for input(s): \"PROTIME\", \"INR\" in the last 72 hours. APTT:No results for input(s): \"APTT\" in the last 72 hours. CARDIAC ENZYMES:  No results for input(s): \"TROPHS\" in the last 72 hours. FASTING LIPID PANEL:  Lab Results   Component Value Date/Time     07/23/2020 12:00 AM    LDLCALC 75 07/23/2020 12:00 AM    TRIG 96 07/23/2020 12:00 AM     LIVER PROFILE:  Recent Labs     12/16/23  1119   AST 41*   ALT 29   LABALBU 3.0*       12/12/23    ECHO (TTE) COMPLETE (PRN CONTRAST/BUBBLE/STRAIN/3D) 12/13/2023  5:16 PM (Final)    Interpretation Summary  Technically difficult study  The left ventricle appears normal in size, mildly increased LV wall thickness, no obvious wall motion abnormality noted  Normal LV systolic function, ejection fraction 50 to 55%  The right ventricle appears normal in size and function  The left and the right atrium appears normal in size  Aortic valve structure appears normal no stenosis no regurgitation  Mitral valve was not well-visualized, no obvious stenosis, trace regurgitation  Tricuspid valve structure appears normal, trace regurgitation, right ventricular systolic pressure 32 mmHg  Pulmonary valve was not well-visualized  Normal aortic root dimension  IVC not assessed  No Significant pericardial effusion seen    Signed by: Elias Mariano MD on 12/13/2023  5:16 PM     IMPRESSION & RECOMMENDATIONS:    Elevated troponin, likely type II. Not suggestive of acute coronary syndrome. Fatigue, weight loss, lack of appetite. Chronic pain syndrome. Hypertension- not well controlled  Diastolic dysfunction  Low risk Echo 12/23. Follow up in clinic for further workup. Can follow up in 2-4 weeks for consideration of outpatient stress test    Discussed with patient and nursing.     Thank you for allowing me to participate in the care of this patient, please do not hesitate to call if you have any

## 2023-12-17 LAB
ALBUMIN SERPL-MCNC: 3.4 G/DL (ref 3.5–5.2)
ALP SERPL-CCNC: 101 U/L (ref 35–104)
ALT SERPL-CCNC: 28 U/L (ref 5–33)
ANION GAP SERPL CALCULATED.3IONS-SCNC: 8 MMOL/L (ref 9–17)
AST SERPL-CCNC: 33 U/L
BASOPHILS # BLD: 0.04 K/UL (ref 0–0.2)
BASOPHILS NFR BLD: 1 % (ref 0–2)
BILIRUB SERPL-MCNC: 0.8 MG/DL (ref 0.3–1.2)
BUN SERPL-MCNC: 2 MG/DL (ref 8–23)
CALCIUM SERPL-MCNC: 9 MG/DL (ref 8.6–10.4)
CHLORIDE SERPL-SCNC: 94 MMOL/L (ref 98–107)
CO2 SERPL-SCNC: 27 MMOL/L (ref 20–31)
CREAT SERPL-MCNC: 0.5 MG/DL (ref 0.5–0.9)
EOSINOPHIL # BLD: 0.04 K/UL (ref 0–0.4)
EOSINOPHILS RELATIVE PERCENT: 1 % (ref 0–4)
ERYTHROCYTE [DISTWIDTH] IN BLOOD BY AUTOMATED COUNT: 13.9 % (ref 11.5–14.9)
GFR SERPL CREATININE-BSD FRML MDRD: >60 ML/MIN/1.73M2
GLUCOSE BLD-MCNC: 101 MG/DL (ref 65–105)
GLUCOSE BLD-MCNC: 112 MG/DL (ref 65–105)
GLUCOSE BLD-MCNC: 122 MG/DL (ref 65–105)
GLUCOSE BLD-MCNC: 127 MG/DL (ref 65–105)
GLUCOSE SERPL-MCNC: 101 MG/DL (ref 70–99)
HCT VFR BLD AUTO: 30.5 % (ref 36–46)
HGB BLD-MCNC: 10.4 G/DL (ref 12–16)
LYMPHOCYTES NFR BLD: 0.87 K/UL (ref 1–4.8)
LYMPHOCYTES RELATIVE PERCENT: 23 % (ref 24–44)
MCH RBC QN AUTO: 38.1 PG (ref 26–34)
MCHC RBC AUTO-ENTMCNC: 34.1 G/DL (ref 31–37)
MCV RBC AUTO: 111.7 FL (ref 80–100)
MONOCYTES NFR BLD: 0.46 K/UL (ref 0.1–1.3)
MONOCYTES NFR BLD: 12 % (ref 1–7)
MORPHOLOGY: ABNORMAL
MORPHOLOGY: ABNORMAL
NEUTROPHILS NFR BLD: 63 % (ref 36–66)
NEUTS SEG NFR BLD: 2.39 K/UL (ref 1.3–9.1)
PLATELET # BLD AUTO: 114 K/UL (ref 150–450)
PMV BLD AUTO: 8.6 FL (ref 6–12)
POTASSIUM SERPL-SCNC: 3.5 MMOL/L (ref 3.7–5.3)
PROT SERPL-MCNC: 6.6 G/DL (ref 6.4–8.3)
RBC # BLD AUTO: 2.74 M/UL (ref 4–5.2)
SODIUM SERPL-SCNC: 129 MMOL/L (ref 135–144)
WBC OTHER # BLD: 3.8 K/UL (ref 3.5–11)

## 2023-12-17 PROCEDURE — 6370000000 HC RX 637 (ALT 250 FOR IP): Performed by: INTERNAL MEDICINE

## 2023-12-17 PROCEDURE — 6370000000 HC RX 637 (ALT 250 FOR IP): Performed by: PSYCHIATRY & NEUROLOGY

## 2023-12-17 PROCEDURE — 85025 COMPLETE CBC W/AUTO DIFF WBC: CPT

## 2023-12-17 PROCEDURE — 6370000000 HC RX 637 (ALT 250 FOR IP): Performed by: NURSE PRACTITIONER

## 2023-12-17 PROCEDURE — 2060000000 HC ICU INTERMEDIATE R&B

## 2023-12-17 PROCEDURE — 6360000002 HC RX W HCPCS: Performed by: NURSE PRACTITIONER

## 2023-12-17 PROCEDURE — 82947 ASSAY GLUCOSE BLOOD QUANT: CPT

## 2023-12-17 PROCEDURE — 99232 SBSQ HOSP IP/OBS MODERATE 35: CPT | Performed by: INTERNAL MEDICINE

## 2023-12-17 PROCEDURE — 80053 COMPREHEN METABOLIC PANEL: CPT

## 2023-12-17 PROCEDURE — 36415 COLL VENOUS BLD VENIPUNCTURE: CPT

## 2023-12-17 PROCEDURE — 2580000003 HC RX 258: Performed by: NURSE PRACTITIONER

## 2023-12-17 RX ADMIN — BUSPIRONE HYDROCHLORIDE 30 MG: 15 TABLET ORAL at 10:54

## 2023-12-17 RX ADMIN — METHADONE HYDROCHLORIDE 10 MG: 10 TABLET ORAL at 10:54

## 2023-12-17 RX ADMIN — SODIUM CHLORIDE, PRESERVATIVE FREE 10 ML: 5 INJECTION INTRAVENOUS at 20:18

## 2023-12-17 RX ADMIN — DILTIAZEM HYDROCHLORIDE 180 MG: 180 CAPSULE, COATED, EXTENDED RELEASE ORAL at 10:53

## 2023-12-17 RX ADMIN — DEXTROSE AND SODIUM CHLORIDE: 5; 900 INJECTION, SOLUTION INTRAVENOUS at 02:39

## 2023-12-17 RX ADMIN — PANTOPRAZOLE SODIUM 40 MG: 40 TABLET, DELAYED RELEASE ORAL at 10:53

## 2023-12-17 RX ADMIN — LEVOTHYROXINE SODIUM 100 MCG: 0.1 TABLET ORAL at 06:06

## 2023-12-17 RX ADMIN — BUSPIRONE HYDROCHLORIDE 30 MG: 15 TABLET ORAL at 20:16

## 2023-12-17 RX ADMIN — CARVEDILOL 6.25 MG: 6.25 TABLET, FILM COATED ORAL at 17:29

## 2023-12-17 RX ADMIN — LISINOPRIL 10 MG: 10 TABLET ORAL at 10:54

## 2023-12-17 RX ADMIN — ENOXAPARIN SODIUM 40 MG: 100 INJECTION SUBCUTANEOUS at 10:53

## 2023-12-17 RX ADMIN — SERTRALINE HYDROCHLORIDE 25 MG: 50 TABLET ORAL at 10:54

## 2023-12-17 RX ADMIN — METHADONE HYDROCHLORIDE 10 MG: 10 TABLET ORAL at 20:16

## 2023-12-17 RX ADMIN — CARVEDILOL 6.25 MG: 6.25 TABLET, FILM COATED ORAL at 10:53

## 2023-12-17 RX ADMIN — Medication 3 MG: at 20:16

## 2023-12-17 RX ADMIN — ASPIRIN 81 MG: 81 TABLET, COATED ORAL at 10:53

## 2023-12-17 ASSESSMENT — PAIN DESCRIPTION - LOCATION: LOCATION: LEG;NECK

## 2023-12-17 ASSESSMENT — PAIN - FUNCTIONAL ASSESSMENT: PAIN_FUNCTIONAL_ASSESSMENT: ACTIVITIES ARE NOT PREVENTED

## 2023-12-17 ASSESSMENT — PAIN DESCRIPTION - DESCRIPTORS: DESCRIPTORS: THROBBING;OTHER (COMMENT)

## 2023-12-17 ASSESSMENT — PAIN SCALES - GENERAL: PAINLEVEL_OUTOF10: 7

## 2023-12-17 NOTE — CARE COORDINATION
ONGOING DISCHARGE PLAN:    Patient is alert and oriented x4. Spoke with the patient and her daughter at length regarding discharge plan. At this time [facility] lists were reviewed regarding SNF and VNS. Patient is agreeable to look them over. Inquired as to whether she would be a candidate for inpatient rehab. PM&R consultation placed, awaiting decision. DME: MINDI, cane, walker, W/C, SC.     VNS: [facility] list at bedside. Patient is deferring decision for visiting nurse or SNF until she can determine if she is a candidate for ARU. Will continue to follow for additional discharge needs. If patient is discharged prior to next notation, then this note serves as note for discharge by case management.     Electronically signed by Doug Mcneill RN on 12/17/2023 at 5:01 PM

## 2023-12-17 NOTE — PLAN OF CARE
Problem: Discharge Planning  Goal: Discharge to home or other facility with appropriate resources  12/17/2023 0513 by Virginia Brothers RN  Outcome: Progressing     Problem: Pain  Goal: Verbalizes/displays adequate comfort level or baseline comfort level  Outcome: Progressing     Problem: Skin/Tissue Integrity  Goal: Absence of new skin breakdown  Description: 1. Monitor for areas of redness and/or skin breakdown  2. Assess vascular access sites hourly  3. Every 4-6 hours minimum:  Change oxygen saturation probe site  4. Every 4-6 hours:  If on nasal continuous positive airway pressure, respiratory therapy assess nares and determine need for appliance change or resting period.   Outcome: Progressing     Problem: ABCDS Injury Assessment  Goal: Absence of physical injury  Outcome: Progressing

## 2023-12-17 NOTE — PROGRESS NOTES
[address]    Progress note            Date:   12/17/2023  Patient name:  Edd Berry  Date of admission:  12/12/2023  2:54 PM  MRN:   [id number]  Account:  [de-identified]  YOB: 1960  PCP:    Matty Morris MD  Room:   99/1081-13  Code Status:    Full Code    Chief Complaint:     Chief Complaint   Patient presents with    Fatigue       History Obtained From:     patient, electronic medical record    History of Present Illness: The patient is a 59 y.o. Non- / non  female who presents with Fatigue   and she is admitted to the hospital for the management of fatigue, elevated troponin  Con Indy is a 59-year-old ill-appearing, malnourished, pleasant lady who presents with difficulty swallowing, decreased energy, decreased appetite, and decreased motivation. She has a history of multiple spinal surgeries, cauda equina syndrome, breast cancer, GERD, hypertension, hypothyroidism, and osteoporosis. She works with an outpatient pain clinic for the management of severe back pain. She takes hydromorphone and hydrocodone. Her pain is not well-managed. She also has a history of alcohol abuse. The patient and her daughter state she drastically scaled back her alcohol use 4 years ago. She states she has a small drink twice a month. The patient reports a severely decreased appetite with difficulty swallowing. She reports extreme fatigue and difficulty ambulating in the home. Denies fever, chills, chest pain, cough, abdominal pain, nausea, diarrhea, and urinary symptoms. There are no aggravating or alleviating factors.     Patient, underwent CT abdomen pelvis, which was negative  Getting evaluated by speech pathologist      Past Medical History:     Past Medical History:   Diagnosis Date    Anxiety     Arthritis     At maximum risk for fall 12/29/2021    caudal equina syndrome and cervical stenosis    Breast cancer ([address] [facility])     Cancer Fingerstick    Collection Time: 12/16/23  5:05 PM   Result Value Ref Range    POC Glucose 123 (H) 65 - 105 mg/dL   POC Glucose Fingerstick    Collection Time: 12/16/23  8:42 PM   Result Value Ref Range    POC Glucose 117 (H) 65 - 105 mg/dL   POC Glucose Fingerstick    Collection Time: 12/17/23  6:06 AM   Result Value Ref Range    POC Glucose 101 65 - 105 mg/dL   CBC with Auto Differential    Collection Time: 12/17/23  6:08 AM   Result Value Ref Range    WBC 3.8 3.5 - 11.0 k/uL    RBC 2.74 (L) 4.0 - 5.2 m/uL    Hemoglobin 10.4 (L) 12.0 - 16.0 g/dL    Hematocrit 30.5 (L) 36 - 46 %    .7 (H) 80 - 100 fL    MCH 38.1 (H) 26 - 34 pg    MCHC 34.1 31 - 37 g/dL    RDW 13.9 11.5 - 14.9 %    Platelets 114 (L) 150 - 450 k/uL    MPV 8.6 6.0 - 12.0 fL    Neutrophils % 63 36 - 66 %    Lymphocytes % 23 (L) 24 - 44 %    Monocytes % 12 (H) 1 - 7 %    Eosinophils % 1 0 - 4 %    Basophils % 1 0 - 2 %    Neutrophils Absolute 2.39 1.3 - 9.1 k/uL    Lymphocytes Absolute 0.87 (L) 1.0 - 4.8 k/uL    Monocytes Absolute 0.46 0.1 - 1.3 k/uL    Eosinophils Absolute 0.04 0.0 - 0.4 k/uL    Basophils Absolute 0.04 0.0 - 0.2 k/uL    Morphology ANISOCYTOSIS PRESENT     Morphology MACROCYTOSIS PRESENT    Comprehensive Metabolic Panel    Collection Time: 12/17/23  6:08 AM   Result Value Ref Range    Sodium 129 (L) 135 - 144 mmol/L    Potassium 3.5 (L) 3.7 - 5.3 mmol/L    Chloride 94 (L) 98 - 107 mmol/L    CO2 27 20 - 31 mmol/L    Anion Gap 8 (L) 9 - 17 mmol/L    Glucose 101 (H) 70 - 99 mg/dL    BUN 2 (L) 8 - 23 mg/dL    Creatinine 0.5 0.5 - 0.9 mg/dL    Est, Glom Filt Rate >60 >60 mL/min/1.73m2    Calcium 9.0 8.6 - 10.4 mg/dL    Total Protein 6.6 6.4 - 8.3 g/dL    Albumin 3.4 (L) 3.5 - 5.2 g/dL    Total Bilirubin 0.8 0.3 - 1.2 mg/dL    Alkaline Phosphatase 101 35 - 104 U/L    ALT 28 5 - 33 U/L    AST 33 (H) <32 U/L   POC Glucose Fingerstick    Collection Time: 12/17/23 11:12 AM   Result Value Ref Range    POC Glucose 122 (H) 65 - 105 mg/dL

## 2023-12-18 LAB
ALBUMIN SERPL-MCNC: 3.1 G/DL (ref 3.5–5.2)
ALP SERPL-CCNC: 95 U/L (ref 35–104)
ALT SERPL-CCNC: 24 U/L (ref 5–33)
ANION GAP SERPL CALCULATED.3IONS-SCNC: 9 MMOL/L (ref 9–17)
AST SERPL-CCNC: 34 U/L
BACTERIA URNS QL MICRO: ABNORMAL
BASOPHILS # BLD: 0 K/UL (ref 0–0.2)
BASOPHILS NFR BLD: 0 % (ref 0–2)
BILIRUB SERPL-MCNC: 0.7 MG/DL (ref 0.3–1.2)
BILIRUB UR QL STRIP: NEGATIVE
BUN SERPL-MCNC: 6 MG/DL (ref 8–23)
CALCIUM SERPL-MCNC: 8.6 MG/DL (ref 8.6–10.4)
CHLORIDE SERPL-SCNC: 93 MMOL/L (ref 98–107)
CLARITY UR: ABNORMAL
CO2 SERPL-SCNC: 27 MMOL/L (ref 20–31)
COLOR UR: YELLOW
CREAT SERPL-MCNC: 0.6 MG/DL (ref 0.5–0.9)
EOSINOPHIL # BLD: 0 K/UL (ref 0–0.4)
EOSINOPHILS RELATIVE PERCENT: 0 % (ref 0–4)
EPI CELLS #/AREA URNS HPF: ABNORMAL /HPF
ERYTHROCYTE [DISTWIDTH] IN BLOOD BY AUTOMATED COUNT: 14 % (ref 11.5–14.9)
EST. AVERAGE GLUCOSE BLD GHB EST-MCNC: 65 MG/DL
GFR SERPL CREATININE-BSD FRML MDRD: >60 ML/MIN/1.73M2
GLUCOSE BLD-MCNC: 112 MG/DL (ref 65–105)
GLUCOSE BLD-MCNC: 136 MG/DL (ref 65–105)
GLUCOSE BLD-MCNC: 199 MG/DL (ref 65–105)
GLUCOSE SERPL-MCNC: 285 MG/DL (ref 70–99)
GLUCOSE UR STRIP-MCNC: NEGATIVE MG/DL
HBA1C MFR BLD: 3.9 % (ref 4–6)
HCT VFR BLD AUTO: 30.3 % (ref 36–46)
HGB BLD-MCNC: 10.1 G/DL (ref 12–16)
HGB UR QL STRIP.AUTO: ABNORMAL
KETONES UR STRIP-MCNC: NEGATIVE MG/DL
LEUKOCYTE ESTERASE UR QL STRIP: ABNORMAL
LYMPHOCYTES NFR BLD: 0.84 K/UL (ref 1–4.8)
LYMPHOCYTES RELATIVE PERCENT: 29 % (ref 24–44)
MCH RBC QN AUTO: 37.7 PG (ref 26–34)
MCHC RBC AUTO-ENTMCNC: 33.2 G/DL (ref 31–37)
MCV RBC AUTO: 113.7 FL (ref 80–100)
MONOCYTES NFR BLD: 0.81 K/UL (ref 0.1–1.3)
MONOCYTES NFR BLD: 28 % (ref 1–7)
MORPHOLOGY: ABNORMAL
MORPHOLOGY: ABNORMAL
NEUTROPHILS NFR BLD: 43 % (ref 36–66)
NEUTS SEG NFR BLD: 1.25 K/UL (ref 1.3–9.1)
NITRITE UR QL STRIP: NEGATIVE
PH UR STRIP: 6.5 [PH] (ref 5–8)
PLATELET # BLD AUTO: 188 K/UL (ref 150–450)
PMV BLD AUTO: 8.3 FL (ref 6–12)
POTASSIUM SERPL-SCNC: 3.5 MMOL/L (ref 3.7–5.3)
PROT SERPL-MCNC: 6.2 G/DL (ref 6.4–8.3)
PROT UR STRIP-MCNC: ABNORMAL MG/DL
RBC # BLD AUTO: 2.67 M/UL (ref 4–5.2)
RBC #/AREA URNS HPF: ABNORMAL /HPF
SODIUM SERPL-SCNC: 129 MMOL/L (ref 135–144)
SP GR UR STRIP: 1.01 (ref 1–1.03)
UROBILINOGEN UR STRIP-ACNC: ABNORMAL EU/DL (ref 0–1)
WBC #/AREA URNS HPF: ABNORMAL /HPF
WBC OTHER # BLD: 2.9 K/UL (ref 3.5–11)

## 2023-12-18 PROCEDURE — 87077 CULTURE AEROBIC IDENTIFY: CPT

## 2023-12-18 PROCEDURE — 6370000000 HC RX 637 (ALT 250 FOR IP): Performed by: INTERNAL MEDICINE

## 2023-12-18 PROCEDURE — 85025 COMPLETE CBC W/AUTO DIFF WBC: CPT

## 2023-12-18 PROCEDURE — 2580000003 HC RX 258: Performed by: NURSE PRACTITIONER

## 2023-12-18 PROCEDURE — 81001 URINALYSIS AUTO W/SCOPE: CPT

## 2023-12-18 PROCEDURE — 97530 THERAPEUTIC ACTIVITIES: CPT

## 2023-12-18 PROCEDURE — 6360000002 HC RX W HCPCS: Performed by: NURSE PRACTITIONER

## 2023-12-18 PROCEDURE — 6370000000 HC RX 637 (ALT 250 FOR IP): Performed by: PSYCHIATRY & NEUROLOGY

## 2023-12-18 PROCEDURE — 87186 SC STD MICRODIL/AGAR DIL: CPT

## 2023-12-18 PROCEDURE — 87086 URINE CULTURE/COLONY COUNT: CPT

## 2023-12-18 PROCEDURE — 6370000000 HC RX 637 (ALT 250 FOR IP): Performed by: NURSE PRACTITIONER

## 2023-12-18 PROCEDURE — 80053 COMPREHEN METABOLIC PANEL: CPT

## 2023-12-18 PROCEDURE — 36415 COLL VENOUS BLD VENIPUNCTURE: CPT

## 2023-12-18 PROCEDURE — 99232 SBSQ HOSP IP/OBS MODERATE 35: CPT | Performed by: INTERNAL MEDICINE

## 2023-12-18 PROCEDURE — 92526 ORAL FUNCTION THERAPY: CPT

## 2023-12-18 PROCEDURE — 2580000003 HC RX 258: Performed by: INTERNAL MEDICINE

## 2023-12-18 PROCEDURE — 99222 1ST HOSP IP/OBS MODERATE 55: CPT | Performed by: PHYSICAL MEDICINE & REHABILITATION

## 2023-12-18 PROCEDURE — 83036 HEMOGLOBIN GLYCOSYLATED A1C: CPT

## 2023-12-18 PROCEDURE — 2060000000 HC ICU INTERMEDIATE R&B

## 2023-12-18 PROCEDURE — 82947 ASSAY GLUCOSE BLOOD QUANT: CPT

## 2023-12-18 PROCEDURE — 97116 GAIT TRAINING THERAPY: CPT

## 2023-12-18 RX ORDER — SODIUM CHLORIDE 9 MG/ML
INJECTION, SOLUTION INTRAVENOUS CONTINUOUS
Status: DISCONTINUED | OUTPATIENT
Start: 2023-12-18 | End: 2023-12-20 | Stop reason: HOSPADM

## 2023-12-18 RX ORDER — DEXTROSE MONOHYDRATE 100 MG/ML
INJECTION, SOLUTION INTRAVENOUS CONTINUOUS PRN
Status: DISCONTINUED | OUTPATIENT
Start: 2023-12-18 | End: 2023-12-20 | Stop reason: HOSPADM

## 2023-12-18 RX ORDER — DILTIAZEM HYDROCHLORIDE 60 MG/1
60 TABLET, FILM COATED ORAL EVERY 8 HOURS SCHEDULED
Status: DISCONTINUED | OUTPATIENT
Start: 2023-12-18 | End: 2023-12-20 | Stop reason: HOSPADM

## 2023-12-18 RX ORDER — SENNOSIDES A AND B 8.6 MG/1
2 TABLET, FILM COATED ORAL DAILY PRN
Status: CANCELLED | OUTPATIENT
Start: 2023-12-18

## 2023-12-18 RX ORDER — POLYETHYLENE GLYCOL 3350 17 G/17G
17 POWDER, FOR SOLUTION ORAL DAILY
Status: CANCELLED | OUTPATIENT
Start: 2023-12-18

## 2023-12-18 RX ORDER — BISACODYL 10 MG
10 SUPPOSITORY, RECTAL RECTAL DAILY PRN
Status: CANCELLED | OUTPATIENT
Start: 2023-12-18

## 2023-12-18 RX ORDER — ACETAMINOPHEN 325 MG/1
650 TABLET ORAL EVERY 4 HOURS PRN
Status: CANCELLED | OUTPATIENT
Start: 2023-12-18

## 2023-12-18 RX ORDER — CEPHALEXIN 500 MG/1
500 CAPSULE ORAL EVERY 8 HOURS SCHEDULED
Status: DISCONTINUED | OUTPATIENT
Start: 2023-12-18 | End: 2023-12-19

## 2023-12-18 RX ADMIN — DILTIAZEM HYDROCHLORIDE 60 MG: 60 TABLET, FILM COATED ORAL at 21:23

## 2023-12-18 RX ADMIN — SODIUM CHLORIDE: 9 INJECTION, SOLUTION INTRAVENOUS at 18:57

## 2023-12-18 RX ADMIN — METHADONE HYDROCHLORIDE 10 MG: 10 TABLET ORAL at 21:16

## 2023-12-18 RX ADMIN — BUSPIRONE HYDROCHLORIDE 30 MG: 15 TABLET ORAL at 21:16

## 2023-12-18 RX ADMIN — Medication 3 MG: at 21:16

## 2023-12-18 RX ADMIN — METHADONE HYDROCHLORIDE 10 MG: 10 TABLET ORAL at 09:14

## 2023-12-18 RX ADMIN — ENOXAPARIN SODIUM 40 MG: 100 INJECTION SUBCUTANEOUS at 09:10

## 2023-12-18 RX ADMIN — BUSPIRONE HYDROCHLORIDE 30 MG: 15 TABLET ORAL at 09:14

## 2023-12-18 RX ADMIN — DEXTROSE AND SODIUM CHLORIDE: 5; 900 INJECTION, SOLUTION INTRAVENOUS at 00:21

## 2023-12-18 RX ADMIN — LEVOTHYROXINE SODIUM 100 MCG: 0.1 TABLET ORAL at 05:51

## 2023-12-18 RX ADMIN — PANTOPRAZOLE SODIUM 40 MG: 40 TABLET, DELAYED RELEASE ORAL at 09:14

## 2023-12-18 RX ADMIN — ACETAMINOPHEN 650 MG: 325 TABLET ORAL at 16:29

## 2023-12-18 RX ADMIN — SERTRALINE HYDROCHLORIDE 25 MG: 50 TABLET ORAL at 09:13

## 2023-12-18 RX ADMIN — CARVEDILOL 6.25 MG: 6.25 TABLET, FILM COATED ORAL at 09:14

## 2023-12-18 RX ADMIN — SODIUM CHLORIDE, PRESERVATIVE FREE 10 ML: 5 INJECTION INTRAVENOUS at 21:16

## 2023-12-18 RX ADMIN — CEPHALEXIN 500 MG: 500 CAPSULE ORAL at 21:16

## 2023-12-18 RX ADMIN — ASPIRIN 81 MG: 81 TABLET, COATED ORAL at 09:14

## 2023-12-18 RX ADMIN — DILTIAZEM HYDROCHLORIDE 60 MG: 60 TABLET, FILM COATED ORAL at 11:52

## 2023-12-18 RX ADMIN — LISINOPRIL 10 MG: 10 TABLET ORAL at 09:14

## 2023-12-18 RX ADMIN — CARVEDILOL 6.25 MG: 6.25 TABLET, FILM COATED ORAL at 16:29

## 2023-12-18 RX ADMIN — POTASSIUM BICARBONATE 40 MEQ: 782 TABLET, EFFERVESCENT ORAL at 18:00

## 2023-12-18 RX ADMIN — SODIUM CHLORIDE, PRESERVATIVE FREE 10 ML: 5 INJECTION INTRAVENOUS at 09:02

## 2023-12-18 NOTE — CARE COORDINATION
ACUTE INPATIENT REHABILITATION  Financial Disclosure Statement: Eligibility and Benefit Verification    Patient Name: Yi Alcantara MRN: [id number]     Disclosure [address] at [facility] provides 24 hour individualized service to patients with functional limitations due to, but not limited to stroke, brain injury, spinal cord injury, major multiple trauma, hip fracture, amputation, and neurological disorders. Acute Inpatient Rehabilitation provides rehabilitative nursing, physician coverage, as well as physical therapy, occupational therapy, speech therapy, recreational therapy and other services as deemed necessary by our Board Certified Physical Medicine and [address], Dr. Elton Langley and Dr. Keturah Gale and Dr. Hyacinth Naidu. [facility]) Acute Inpatient Rehabilitation at [facility] is fully accredited by the Commission on Accreditation of Rehabilitation Facilities (CARF) and The Joint Commission (TJC). At a minimum, you will receive 5 days of therapy services totaling at least 15 hours per week. Your treatment program will consist of physical therapy at least 7.5 hours per week; occupational therapy 7.5 hours per week; and speech therapy 1.5 hours per week, as applicable. Your estimated length of stay is currently:  Approximately 2 Weeks  Please Note: Estimated length of stay is based on individual condition and Acute Inpatient Rehabilitation specific needs. Length of stay may vary based upon interdisciplinary team assessment, insurance approval, and patient progression.     Your insurance coverage has been verified as follows:   Primary Insurance: Medicare   Deductible: $ 1600                       Coverage:  Per Medicare Benefit Period  Days 1-60:  $0 Co-Insurance  Days 61-90:  $400/day Co-Insurance  Days 91 and beyond:  $800/day Co-Insurance      Secondary Insurance: [facility] [place]  Secondary insurance policies often

## 2023-12-18 NOTE — PROGRESS NOTES
[address]    Progress note            Date:   12/18/2023  Patient name:  Kyara Wilson  Date of admission:  12/12/2023  2:54 PM  MRN:   [id number]  Account:  [de-identified]  YOB: 1960  PCP:    Latanya Musa MD  Room:   3068/6688-30  Code Status:    Full Code    Chief Complaint:     Chief Complaint   Patient presents with    Fatigue       History Obtained From:     patient, electronic medical record    History of Present Illness: The patient is a 59 y.o. Non- / non  female who presents with Fatigue   and she is admitted to the hospital for the management of fatigue, elevated troponin  Gonzalez Jeter is a 78-year-old ill-appearing, malnourished, pleasant lady who presents with difficulty swallowing, decreased energy, decreased appetite, and decreased motivation. She has a history of multiple spinal surgeries, cauda equina syndrome, breast cancer, GERD, hypertension, hypothyroidism, and osteoporosis. She works with an outpatient pain clinic for the management of severe back pain. She takes hydromorphone and hydrocodone. Her pain is not well-managed. She also has a history of alcohol abuse. The patient and her daughter state she drastically scaled back her alcohol use 4 years ago. She states she has a small drink twice a month. The patient reports a severely decreased appetite with difficulty swallowing. She reports extreme fatigue and difficulty ambulating in the home. Denies fever, chills, chest pain, cough, abdominal pain, nausea, diarrhea, and urinary symptoms. There are no aggravating or alleviating factors.     Patient, underwent CT abdomen pelvis, which was negative  Getting evaluated by speech pathologist      Past Medical History:     Past Medical History:   Diagnosis Date    Anxiety     Arthritis     At maximum risk for fall 12/29/2021    caudal equina syndrome and cervical stenosis    Breast cancer ([address])     Cancer chest pain, cardiology consulted in emergency room, no indication for heparin drip, echo ordered  Patient feeling depressed, consulted psychiatry  History of breast cancer    December 18  Seen and examined face-to-face,  Elevated troponin, possible type II MI, cardiology adjusted medications, okay to go home as per them,  Neurology seen , ordered MRI , results normal   She sees pain management and on heavy pain medications, on norco and methadone, will dc norco   PT OT,  Overall moderate malnutrition,  Seen by speech, continued on a special diet,  Failure to thrive,  Mostly bedbound,  PM&R consulted     More confused today   UA done, has UTI , started on keflex 500 tid x 1 week       Consultations:   IP CONSULT TO INTERNAL MEDICINE  IP CONSULT TO CARDIOLOGY  IP CONSULT TO PSYCHIATRY  IP CONSULT TO PSYCHIATRY  IP CONSULT TO NEUROLOGY  IP CONSULT TO PHYSICAL MEDICINE REHAB  IP CONSULT TO DIETITIAN  IP CONSULT TO SOCIAL WORK  IP CONSULT TO INTERNAL MEDICINE    Patient is admitted as inpatient status because of co-morbidities listed above, severity of signs and symptoms as outlined, requirement for current medical therapies and most importantly because of direct risk to patient if care not provided in a hospital setting. Terrence Macias MD  12/18/2023  6:04 PM    Copy sent to Dr. Isela Gardiner MD    Please note that this chart was generated using voice recognition Dragon dictation software. Although every effort was made to ensure the accuracy of this automated transcription, some errors in transcription may have occurred.

## 2023-12-18 NOTE — PROGRESS NOTES
Physician Progress Note      Adam Foy  CSN #:                  [id number]  :                       1960  ADMIT DATE:       2023 2:54 PM  [address] [facility] DATE:  RESPONDING  PROVIDER #:        Della Almeida MD          QUERY TEXT:    Patient admitted with depression, weight loss, failure to thrive, chronic   pain. Noted documentation of type II MI in Cardiology PN dated 23. In order to support the diagnosis of type II MI, please refer to 4th universal   definition of MI below and include additional clinical indicators in your   documentation. Or please document if the diagnosis of type II MI has been   ruled out after study. ? The medical record reflects the following:  Risk Factors:  -59 F presents with depression, decreased appetite    Clinical Indicators:  -troponins 63. ..61.. Maria Rene Julian 51  -2D:  The left ventricle appears normal in size, mildly increased LV wall   thickness, no obvious wall motion abnormality noted. Normal LV systolic   function, ejection fraction 50 to 55%. -per H&P, \"Elevated troponins, no chest pain, cardiology consulted in   emergency room, no indication for heparin drip, echo ordered\". -Cardiology PN , \"Elevated troponin, likely type II. Not suggestive of   acute coronary syndrome\". Treatment:  -labs, imaging, 2D, Cardiology consult    Fourth Universal Definition of Myocardial Infarction:  Clearly separates MI   from myocardial injury. Patients with elevated blood troponin levels but   without clinical evidence of ischemia are said to have had a myocardial   injury. ? To have a myocardial infarction requires both an elevated troponin   blood test along with at least one of the following:  - Symptoms of acute myocardial ischemia (Types 1 - 5 MI)  - Clinical evidence of ischemia, as evidenced in an electrocardiogram (EKG)   showing new ischemic changes (Type 1, Type 2, Type 3, or Type 4a MI)  - Development of pathological Q waves (Types 1 - 5 MI)  -

## 2023-12-18 NOTE — PROGRESS NOTES
[facility] -   Cardiology  Progress Note    Patient Name: Avril Monzon  MRN: [id number]  Admission Date: 7/1/2020  Hospital Length of Stay: 3 days  Code Status: Full Code   Attending Physician: No att. providers found   Primary Care Physician: Rose Parks MD  Expected Discharge Date: 7/4/2020  Principal Problem:Acute on chronic combined systolic and diastolic congestive heart failure    Subjective:     Hospital Course:   Avril Monzon is a 74 y.o. female pt admitted to ICU on NIV with a dx of acute on chronic systolic and diastolic CHF exacerbation , Volume overload , Uncontrolled HTN , Acute hypoxic respiratory failure  And CKD stage 5 . She was started on NIV , NTG drip and lasix drip with and improvement of her sx . She is (-) 2.3 lt since admission . She is wean off  BIPAP .   She cont on NTG drip and lasix drip . The BP has improved  . Her home BP medication were resume .   She has improved with IV diuresis, cardiology consulted for CHF. Discussed will need volume removal and will work up pulm HTN as outpt. Pt feels well now likely will be DC home.           Results for orders placed during the hospital encounter of 07/01/20   Echo Color Flow Doppler? Yes     Narrative · Concentric left ventricular hypertrophy.  · Normal left ventricular systolic function. The estimated ejection   fraction is 60%.  · Grade II (moderate) left ventricular diastolic dysfunction consistent   with pseudonormalization.  · Normal right ventricular systolic function.  · Mild mitral regurgitation.  · Mild to moderate tricuspid regurgitation.  · Mild pulmonic regurgitation.  · Moderate mitral prolapse of the anterior mitral leaflet.  · Normal central venous pressure (3 mmHg).  · The estimated PA systolic pressure is 68 mmHg.  · Pulmonary hypertension present.     7/4/20 - changed to lasix 80 with BIdil, pt feels well ready for DC today. NO acute events overnight. Will follow up in CV clinic    Past Medical History:  Patient intermittently having hallucination of a cat in her room. Patient redirected from pulling IV line.   Diagnosis Date    Acute hypoxemic respiratory failure 11/26/2018    Anemia     Arthritis     back, hand, knees    Back pain     Cataract     CKD stage G4/A3, GFR 15 - 29 and albumin creatinine ratio >300 mg/g     GFR 40% Jan 2014 and 33% ub 3/2014 ([facility])    Coronary artery disease involving native coronary artery of native heart 11/30/2018    Diabetic retinopathy     DM (diabetes mellitus) type II controlled, neurological manifestation     Exudative age-related macular degeneration of left eye with active choroidal neovascularization 2/5/2019    GERD (gastroesophageal reflux disease)     Glaucoma     Heart failure     Hyperlipidemia     Hypertension     Non-ST elevation MI (NSTEMI) 11/28/2018    NSTEMI (non-ST elevated myocardial infarction) 11/27/2018    Peripheral neuropathy     Polyneuropathy     Proteinuria     >6 mo     Seizures     [facility] 1/2014 -dick CT NRI showed small vessel    Stroke 2012,2014    Tobacco dependence     Type 2 diabetes with peripheral circulatory disorder, controlled        Past Surgical History:   Procedure Laterality Date    BREAST LUMPECTOMY Left     benign, when patient was 14 years old    BREAST MASS EXCISION      ,benign, age 14.    CATHETERIZATION OF BOTH LEFT AND RIGHT HEART N/A 11/28/2018    Procedure: CATHETERIZATION, HEART, BOTH LEFT AND RIGHT;  Surgeon: Michelle Holman MD;  Location: [facility] C[facility] LAB;  Service: Cardiology;  Laterality: N/A;    CATHETERIZATION OF BOTH LEFT AND RIGHT HEART N/A 11/30/2018    Procedure: CATHETERIZATION, HEART, BOTH LEFT AND RIGHT;  Surgeon: Michelle Holman MD;  Location: [facility] C[facility] LAB;  Service: Cardiology;  Laterality: N/A;    HYSTERECTOMY  1982    INSERTION OF SHUNT      LEFT HEART CATHETERIZATION Left 12/3/2018    Procedure: CATHETERIZATION, HEART, LEFT;  Surgeon: Michelle Holman MD;  Location: [facility] CATH LAB;  Service: Cardiology;  Laterality: Left;  LAD ATHERECTOMY STENT/ FEMORAL APPROACH    OOPHORECTOMY      wrist  cyst Right 1980s    dorsal wrist cyst       Review of patient's allergies indicates:   Allergen Reactions    Codeine Swelling    Darvon [propoxyphene] Swelling       Current Facility-Administered Medications on File Prior to Encounter   Medication    aflibercept Soln 2 mg     Current Outpatient Medications on File Prior to Encounter   Medication Sig    ACCU-CHEK ARDEN PLUS METER [facility] USE TO TEST TWICE DAILY    amLODIPine (NORVASC) 5 MG tablet Take 5 mg by mouth once daily.    aspirin (ECOTRIN) 81 MG EC tablet Take 1 tablet (81 mg total) by mouth once daily.    atorvastatin (LIPITOR) 80 MG tablet Take 1 tablet (80 mg total) by mouth once daily. (Patient taking differently: Take 40 mg by mouth once daily. )    BIDIL 20-37.5 mg Tab TAKE 2 TABLETS BY MOUTH THREE TIMES DAILY    blood sugar diagnostic Strp 1 strip by Misc.(Non-Drug; Combo Route) route 3 (three) times daily. relion ultima    calcitRIOL (ROCALTROL) 0.25 MCG Cap Take 1 capsule by mouth once daily    clopidogrel (PLAVIX) 75 mg tablet TAKE 1 TABLET BY MOUTH ONCE DAILY    fluticasone propionate (FLONASE) 50 mcg/actuation nasal spray 1 spray (50 mcg total) by Each Nostril route once daily.    gabapentin (NEURONTIN) 300 MG capsule Take 1 capsule (300 mg total) by mouth every evening.    insulin NPH-insulin regular, 70/30, (NOVOLIN 70/30) 100 unit/mL (70-30) injection INJECT SUBCUTANEOUSLY 30 UNITS IN THE MORNING AND INJECT 25 UNITS IN THE EVENING (Patient taking differently: INJECT SUBCUTANEOUSLY 30 UNITS IN THE MORNING AND INJECT 25 UNITS IN THE EVENING)    lancets (ACCU-CHEK SOFTCLIX LANCETS) Misc 1 lancet by Misc.(Non-Drug; Combo Route) route 3 (three) times daily.    loratadine (CLARITIN) 10 mg tablet Take 1 tablet (10 mg total) by mouth once daily.    metoprolol tartrate (LOPRESSOR) 25 MG tablet TAKE 1 TABLET BY MOUTH TWICE DAILY    nitroGLYCERIN (NITROSTAT) 0.4 MG SL tablet Place 1 tablet (0.4 mg total) under the tongue every 5 (five)  minutes as needed for Chest pain.    ondansetron (ZOFRAN-ODT) 4 MG TbDL DISSOLVE 1 TABLET IN MOUTH THREE TIMES DAILY AS NEEDED FOR NAUSEA FOR 5 DAYS    sodium bicarbonate 325 MG tablet TAKE 1 TABLET BY MOUTH THREE TIMES DAILY    torsemide (DEMADEX) 20 MG Tab Take 2 tablets (40 mg total) by mouth once daily.    [DISCONTINUED] furosemide (LASIX) 20 MG tablet      Family History     Problem Relation (Age of Onset)    Cancer Maternal Grandmother    Diabetes Mother    Heart disease Mother    Hyperlipidemia Mother    Hypertension Mother    Muscular dystrophy Son        Tobacco Use    Smoking status: Former Smoker     Packs/day: 1.50     Years: 30.00     Pack years: 45.00     Types: Cigarettes     Quit date: 1990     Years since quittin.5    Smokeless tobacco: Former User   Substance and Sexual Activity    Alcohol use: No     Alcohol/week: 0.0 standard drinks    Drug use: No    Sexual activity: Never     Review of Systems   Constitution: Positive for decreased appetite, malaise/fatigue and weight gain.   HENT: Negative.    Eyes: Negative.    Cardiovascular: Positive for dyspnea on exertion and leg swelling. Negative for chest pain.   Respiratory: Positive for cough and shortness of breath.    Endocrine: Negative.    Hematologic/Lymphatic: Negative.    Skin: Negative.    Gastrointestinal: Negative.    Genitourinary: Negative.    Neurological: Negative.    Psychiatric/Behavioral: Negative.    Allergic/Immunologic: Negative.      Objective:     Vital Signs (Most Recent):  Temp: 98.5 °F (36.9 °C) (20 1632)  Pulse: 74 (20 1700)  Resp: 18 (20 1632)  BP: 125/60 (20 1632)  SpO2: 97 % (20 1632) Vital Signs (24h Range):  Temp:  [98.2 °F (36.8 °C)-99 °F (37.2 °C)] 98.5 °F (36.9 °C)  Pulse:  [64-74] 74  Resp:  [16-18] 18  SpO2:  [97 %-100 %] 97 %  BP: (115-171)/(58-76) 125/60     Weight: 66.2 kg (146 lb)  Body mass index is 23.57 kg/m².    SpO2: 97 %  O2 Device (Oxygen Therapy): nasal  cannula      Intake/Output Summary (Last 24 hours) at 7/4/2020 2007  Last data filed at 7/4/2020 1800  Gross per 24 hour   Intake 624 ml   Output --   Net 624 ml       Lines/Drains/Airways     None                 Physical Exam   Constitutional: She is oriented to person, place, and time. She appears well-developed and well-nourished. No distress.   HENT:   Head: Normocephalic and atraumatic.   Nose: Nose normal.   Mouth/Throat: Oropharynx is clear and moist.   Eyes: Conjunctivae and EOM are normal. No scleral icterus.   Neck: Normal range of motion. Neck supple. No thyromegaly present.   Cardiovascular: Normal rate, regular rhythm, S1 normal and S2 normal. Exam reveals no gallop, no S3, no S4 and no friction rub.   Murmur heard.  Pulmonary/Chest: Effort normal and breath sounds normal. No stridor. No respiratory distress. She has no wheezes. She has no rales. She exhibits no tenderness.   Abdominal: Soft. Bowel sounds are normal. She exhibits no distension and no mass. There is no abdominal tenderness. There is no rebound.   Genitourinary:    Genitourinary Comments: Deferred     Musculoskeletal: Normal range of motion.         General: Edema (trace - resolved) present. No tenderness or deformity.   Lymphadenopathy:     She has no cervical adenopathy.   Neurological: She is alert and oriented to person, place, and time. She exhibits normal muscle tone. Coordination normal.   Skin: Skin is warm and dry. No rash noted. She is not diaphoretic. No erythema. No pallor.   Psychiatric: She has a normal mood and affect. Her behavior is normal. Judgment and thought content normal.   Nursing note and vitals reviewed.      Significant Labs:   All pertinent lab results from the last 24 hours have been reviewed. and   Recent Lab Results       07/04/20  1639   07/04/20  1036   07/04/20  0450   07/03/20  2115        Albumin     2.7            2.7       Alkaline Phosphatase     58       ALT     39       Anion Gap     12             12       AST     16       Baso #     0.01       Basophil%     0.2       BILIRUBIN TOTAL     0.2  Comment:  For infants and newborns, interpretation of results should be based  on gestational age, weight and in agreement with clinical  observations.  Premature Infant recommended reference ranges:  Up to 24 hours.............<8.0 mg/dL  Up to 48 hours............<12.0 mg/dL  3-5 days..................<15.0 mg/dL  6-29 days.................<15.0 mg/dL         BUN, Bld     81            81       Calcium     8.6            8.6       Chloride     105            105       CO2     26            26       Creatinine     5.1            5.1       Differential Method     Automated       eGFR if      9            9       eGFR if non      8  Comment:  Calculation used to obtain the estimated glomerular filtration  rate (eGFR) is the CKD-EPI equation.               8  Comment:  Calculation used to obtain the estimated glomerular filtration  rate (eGFR) is the CKD-EPI equation.          Eos #     0.1       Eosinophil%     1.3       Glucose     153            153       Gran # (ANC)     4.2       Gran%     78.5       Hematocrit     28.3       Hemoglobin     8.9       Immature Grans (Abs)     0.02  Comment:  Mild elevation in immature granulocytes is non specific and   can be seen in a variety of conditions including stress response,   acute inflammation, trauma and pregnancy. Correlation with other   laboratory and clinical findings is essential.         Immature Granulocytes     0.4       Lymph #     0.7       Lymph%     12.8       MCH     30.1       MCHC     31.4       MCV     96       Mono #     0.4       Mono%     6.8       MPV     10.5       nRBC     0       Phosphorus     4.3       Platelets     185       POCT Glucose 180 118   190     Potassium     3.7            3.7       PROTEIN TOTAL     5.9       RBC     2.96       RDW     12.6       Sodium     143            143       WBC     5.30              Significant Imaging: Echocardiogram:   2D echo with color flow doppler:   Results for orders placed or performed during the hospital encounter of 11/26/18   2D echo with color flow doppler   Result Value Ref Range    QEF 45 55 - 65    Mitral Valve Regurgitation MILD TO MODERATE     Diastolic Dysfunction Yes (A)     Est. PA Systolic Pressure 69.15 (A)     Tricuspid Valve Regurgitation MILD     Narrative    Date of Procedure: 11/27/2018        TEST DESCRIPTION   Technical Quality: This is a portable study performed at the patient's bedside. This is a technically challenging study. There is poor endocardial definition.     Aorta: The aortic root is normal in size, measuring 2.6 cm at sinotubular junction and 2.6 cm at Sinuses of Valsalva. The proximal ascending aorta is normal in size, measuring 2.6 cm across.     Left Atrium: The left atrial volume index is normal, measuring 31.92 cc/m2.     Left Ventricle: The left ventricle is normal in size, with an end-diastolic diameter of 4.6 cm, and an end-systolic diameter of 3.2 cm. LV wall thickness is normal, with the septum measuring 1.0 cm and the posterior wall measuring 0.9 cm across. Relative   wall thickness was normal at 0.39, and the LV mass index was 92.3 g/m2 consistent with normal left ventricular mass. The following segments were akinetic: apical septum, apical inferior wall.  The following segments were moderately hypokinetic: mid inferoseptum.  The following segments were severely hypokinetic: mid anteroseptum.  Left ventricular systolic function appears mildly depressed. Visually estimated ejection fraction is 45-50%. The LV Doppler derived stroke volume equals 99.0 ccs.     Diastolic indices: E wave velocity 1.2 m/s, E/A ratio 1.3,  msec., E/e' ratio(avg) 13. There is diastolic dysfunction secondary to relaxation abnormality.     Right Atrium: The right atrium is normal in size, measuring 4.0 cm in length and 3.0 cm in width in the apical view.      Right Ventricle: The right ventricle is normal in size. Global right ventricular systolic function appears normal. Tricuspid annular plane systolic excursion (TAPSE) is 2.0 cm. The estimated PA systolic pressure is 69 mmHg.     Aortic Valve:  The peak gradient obtained across the aortic valve is 13 mmHg, with a mean gradient of 7 mmHg. Using a left ventricular outflow tract diameter of 2.0 cm, a left ventricular outflow tract velocity time integral of 33 cm, and a peak   instantaneous transvalvular velocity time integral of 34 cm, the calculated aortic valve area is 2.91 cm2(AVAi is 1.57 cm2/m2).     Mitral Valve:  The pressure half time is 42 msec. The calculated mitral valve area is 5.24 cm2. There is mild to moderate mitral regurgitation.     Tricuspid Valve:  There is mild tricuspid regurgitation.     IVC: IVC is enlarged but collapses > 50% with a sniff, suggesting intermediate right atrial pressure of 8 mmHg.     Atrial Septum: The atrial septum is intact.     Intracavitary: There is no evidence of pericardial effusion, intracavity mass, thrombi, or vegetation.     Other: There is a right pleural effusion present.         CONCLUSIONS     1 - Wall motion abnormalities.     2 - Mildly depressed left ventricular systolic function (EF 45-50%).     3 - Impaired LV relaxation, normal LAP (grade 1 diastolic dysfunction).     4 - Normal right ventricular systolic function .     5 - Pulmonary hypertension. The estimated PA systolic pressure is 69 mmHg.     6 - Mild to moderate mitral regurgitation.     7 - Mild tricuspid regurgitation.     8 - Intermediate central venous pressure.     9 - Right pleural effusion.             This document has been electronically    SIGNED BY: Michelle Holman MD On: 11/27/2018 10:58    This document was originally electronically signed on: 11/27/2018 10:57    and X-Ray: CXR: X-Ray Chest 1 View (CXR):   Results for orders placed or performed during the hospital encounter of 07/01/20    X-Ray Chest 1 View    Narrative    EXAMINATION:  XR CHEST 1 VIEW    CLINICAL HISTORY:  sob;    COMPARISON:  July 1, 2020    FINDINGS:  Worsening small effusions.  Interval development of a ground-glass opacity in the right upper lung.  Similar degree of vascular congestion with less prominent Kerley B-lines currently.  The hilar and mediastinal contours and osseous structures are otherwise unchanged.  Stable cardiac silhouette size enlargement, tortuosity of the aorta and calcifications of the aortic knob.      Impression    1.  Interval development of ground-glass opacity within the right upper lung.  Worsening left effusion.  Stable small right effusion.  Similar degree of vascular congestion with improved Kerley B lines.  Stable cardiac silhouette size enlargement.  Again, interstitial pulmonary edema versus interstitial infectious process must be considered.      Electronically signed by: Trev Dean MD  Date:    07/03/2020  Time:    08:01     Assessment and Plan:     Brief HPI: CHF exac diuresed well, ready for DC    Coronary artery disease involving native coronary artery of native heart  Cont home meds  F/u as outpt  No chest pain    Stage 5 chronic kidney disease  WIll f/u with nephro regarding HD start    Pulmonary hypertension  Will have further workup as outpt  Likely due to LH disease with volume overload  May need RHC will discuss on follow up    Anemia in stage 4 chronic kidney disease  Cont tx per primary team    Hypertension associated with diabetes  BP meds titrated well controlled now  Cont to monitor        VTE Risk Mitigation (From admission, onward)         Ordered     enoxaparin injection 30 mg  Every 24 hours      07/01/20 1848     IP VTE HIGH RISK PATIENT  Once      07/01/20 1848     Place sequential compression device  Until discontinued      07/01/20 1848                Demetrio Mejia Md, MD  Cardiology  [facility]

## 2023-12-18 NOTE — PROGRESS NOTES
EPIFANIO Block notified writer  that patient saying she is seeing animals in the room and family members.

## 2023-12-18 NOTE — CARE COORDINATION
Writer received call from Jenaro in ARU that pt is appropriate after PM&R, ARU can accept this pt today at 1600. Writer placed call to bedside RN Marcela regarding this and the need for discharge readmit with Lovenox.  Marcela agreeable, confirmed this time with Jenaro.  Electronically signed by SURINDER Osuna on 12/18/2023 at 1:17 PM

## 2023-12-18 NOTE — PROGRESS NOTES
Department of Psychiatry  Consult Progress Note  12/18/2023    Patient Name: Rajani Iglesias    Reason for initial consult:  Anxiety, depression          Interval History:      No acute psychiatric events overnight. Patient reports that she did not sleep very well last night, only about 4 hours, because she had a lot on her mind. She states that she has spent a lot of time thinking about her medical problems and her mobility issues. She states that her current mood is fine. She denies any auditory or visual hallucinations. She denies SI/HI.    HISTORY OF PRESENT ILLNESS ON ADMISSION:    The patient is a 63 y.o. female with significant past psychiatric history of depression who presents with fatigue.  The patient has a medical history significant for multiple spinal surgeries, cauda equina syndrome, breast cancer, GERD, HTN, hypothyroidism, osteoporosis and severe back pain.  She is on long-term opioid therapy.  She has a history of alcohol abuse.  She has elevated troponins.  The patient sodium was normal on presentation but she is hyponatremic and hypokalemic at this time.  Her TSH is normal.  Her urine drug screen not been obtained.  Her liver enzymes are elevated.   -Seen at bedside.  present. Has some depression and anxiety.   -In the last couple of days she had difficulty using the walker.   -Last drink was over 2 days ago per patient.   -Had quit taking Buspirone because of lack of efficacy.      The patient is not currently receiving care for the above psychiatric illness.        Psychiatric Review of Systems           Obsessions and Compulsions: Denies       Harper or Hypomania: Denies     Hallucinations: Denies     Panic Attacks:  Denies     Delusions:  Denies     Phobias:  Denies     Trauma: Denies        Substance Abuse History:  ETOH: History of heavy drinking.   Marijuana: occasional  Opiates: Denies abusing opiates but is prescribed Norco long-term  Other Drugs: cocaine        Past Psychiatric  10 mg, Oral, TID PRN  busPIRone (BUSPAR) tablet 30 mg, 30 mg, Oral, BID  dilTIAZem (CARDIZEM CD) extended release capsule 180 mg, 180 mg, Oral, Daily  [Held by provider] HYDROcodone-acetaminophen (NORCO) 5-325 MG per tablet 1 tablet, 1 tablet, Oral, Q8H PRN  levothyroxine (SYNTHROID) tablet 100 mcg, 100 mcg, Oral, Daily  methadone (DOLOPHINE) tablet 10 mg, 10 mg, Oral, BID  pantoprazole (PROTONIX) tablet 40 mg, 40 mg, Oral, Daily  sodium chloride flush 0.9 % injection 5-40 mL, 5-40 mL, IntraVENous, 2 times per day  sodium chloride flush 0.9 % injection 5-40 mL, 5-40 mL, IntraVENous, PRN  0.9 % sodium chloride infusion, , IntraVENous, PRN  potassium chloride (KLOR-CON M) extended release tablet 40 mEq, 40 mEq, Oral, PRN **OR** potassium bicarb-citric acid (EFFER-K) effervescent tablet 40 mEq, 40 mEq, Oral, PRN **OR** potassium chloride 10 mEq/100 mL IVPB (Peripheral Line), 10 mEq, IntraVENous, PRN  magnesium sulfate 2000 mg in water 50 mL IVPB, 2,000 mg, IntraVENous, PRN  enoxaparin (LOVENOX) injection 40 mg, 40 mg, SubCUTAneous, Daily  ondansetron (ZOFRAN-ODT) disintegrating tablet 4 mg, 4 mg, Oral, Q8H PRN **OR** ondansetron (ZOFRAN) injection 4 mg, 4 mg, IntraVENous, Q6H PRN  polyethylene glycol (GLYCOLAX) packet 17 g, 17 g, Oral, Daily PRN  acetaminophen (TYLENOL) tablet 650 mg, 650 mg, Oral, Q6H PRN **OR** acetaminophen (TYLENOL) suppository 650 mg, 650 mg, Rectal, Q6H PRN  melatonin tablet 3 mg, 3 mg, Oral, Nightly  dextrose 5 % and 0.9 % sodium chloride infusion, , IntraVENous, Continuous  glucose chewable tablet 16 g, 4 tablet, Oral, PRN  dextrose bolus 10% 125 mL, 125 mL, IntraVENous, PRN **OR** dextrose bolus 10% 250 mL, 250 mL, IntraVENous, PRN  glucagon injection 1 mg, 1 mg, SubCUTAneous, PRN  dextrose 10 % infusion, , IntraVENous, Continuous PRN      ASSESSMENT    DSM-Diagnosis:  Depression, unspecified  Anxiety, unspecified    Patient Active Problem List   Diagnosis    Stenosis of cervical spine with documentation are stated below otherwise agree with assessment. The patient was seen face-to-face. She is confused. She initially insisted that she was at home even though it looks like it was a hospital and after some time of looking around she says she was probably in the hospital.  Then she reached out to her fall story thinking that it was the person and then realized it was a soft diet. Have noted that the patient's sodium is below 130. At this time her Zoloft will be discontinued. Buspirone is left unchanged      PLAN  Medications as noted above  Attempt to develop insight  Psycho-education conducted. Supportive Therapy conducted.   Follow-up daily while on inpatient unit    Electronically signed by Epifanio Christie MD on 12/18/23 at 2:26 PM EST

## 2023-12-18 NOTE — PROGRESS NOTES
Rehabilitation Physical Therapy    Date: 2023  Patient Name: Wesley Dominique        MRN: [id number]    : 1960  (59 y.o.)  Gender: female   Referring Practitioner: Alia FREGOSO CNP  Diagnosis: decreased appetitie       Discharge Recommendations:  Patient would benefit from continued therapy after discharge, Therapy recommended at discharge, Continue to assess pending progress  Equipment Needed: No    Assessment  Assessment  Activity Tolerance: Treatment limited secondary to decreased cognition;Treatment limited secondary to agitation (Increased hallucinations, seeing a baby, a boy, yelling for family that isn't present to got get a shirt out of the closet. Then pt was yelling for \"mom\".)  Discharge Recommendations: Patient would benefit from continued therapy after discharge; Therapy recommended at discharge;Continue to assess pending progress    Plan  Physical Therapy Plan  General Plan:  (5-7 treatments/ week)  Specific Instructions for Next Treatment: advance transfers w/ 2 assist using olga lidia stedy, instruct in exercise program, advance to gait as tolerated, co treat w/ OT  Current Treatment Recommendations: Strengthening, Home exercise program, Balance training, Gait training, Functional mobility training, Safety education & training, Transfer training, Patient/Caregiver education & training, Equipment evaluation, education, & procurement, Endurance training, Therapeutic activities, Co-Treatment     Restrictions  Restrictions/Precautions: Fall Risk, Up as Tolerated  Implants present? : Metal implants (cervical neck surgery)  Other position/activity restrictions: NO BP, IV sticks or venipunctures right arm    Subjective  Subjective  Subjective: Pt is agitated upon arrival trying to climb out of bed with PCT and pt's sister present trying to redirect pt. Pt is very confused and thinks she is at home. DENA Medrano reports pt will be going to ARU today.   Pain: Pt reports 7/10 pain in neck radiating     Overall

## 2023-12-18 NOTE — PROGRESS NOTES
[facility]   INPATIENT OCCUPATIONAL THERAPY  PROGRESS NOTE  Date: 2023  Patient Name: Shante Arrington       Room: 1299/1942-86  MRN: [id number]    : 1960  (59 y.o.)  Gender: female   Referring Practitioner: Tonio Dan MD  Diagnosis: Elevated troponin      Discharge Recommendations:  Further Occupational Therapy is recommended upon facility discharge. OT Equipment Recommendations  Other: TBD    Restrictions/Precautions  Restrictions/Precautions  Restrictions/Precautions: Fall Risk;Up as Tolerated  Required Braces or Orthoses?: No  Implants present? : Metal implants (cervical neck surgery)  Position Activity Restriction  Other position/activity restrictions: NO BP, IV sticks or venipunctures right arm    O2 Device: None (Room air)    Subjective  Subjective  Subjective: Pt reporting see various people/objects in room including tick, baby and boy in blue. Pt also perseverating on putting shirt on and asking her  Jacob Mom (not present) to retrieve things for her. Comments: DENA Medrano Ok'd treatment, co-tx with Kayla PEREZ. Pt cooperative however confused and slightly agitated throughout. Pt trying to climb out of bed upon arrival, pt sister present upon arrival however leaves at start of therapy session. DENA Medrano reports, pt transferring to ARU this afternoon. Objective  Orientation  Overall Orientation Status: Impaired  Orientation Level: Oriented to person;Disoriented to place; Disoriented to time;Disoriented to situation (Pt thinks she is at home.)  Cognition  Overall Cognitive Status: Exceptions  Arousal/Alertness: Inconsistent responses to stimuli  Following Commands: Inconsistently follows commands; Follows one step commands with repetition  Attention Span: Difficulty dividing attention; Difficulty attending to directions  Memory: Decreased recall of recent events;Decreased short term memory;Decreased long term memory (very confused)  Safety Judgement: Mobility  Functional - Mobility Device: Rolling Walker  Activity: Other (short walk bed>chair, unable to safely walk back to bed so set pt up for stand pivot for improved tolernace/safetly.)  Assist Level: Dependent/Total (mod A x2)  Functional Mobility Comments: unsafe, impulsive and confused throughout.     Patient Education  Patient Education  Education Given To: Patient  Education Provided: Role of Therapy, Plan of Care, Transfer Training, Orientation, Fall Prevention Strategies  Education Method: Demonstration, Verbal  Barriers to Learning: Cognition  Education Outcome: Continued education needed, Unable to demonstrate understanding, Unable to verbalize    Goals  Short Term Goals  Time Frame for Short Term Goals: By discharge, pt will  Short Term Goal 1: perform functional transfers with min A x2, least restrictive device, and Good safety  Short Term Goal 2: OT to further assess functional mobility when appropriate and notify OTR to update goal  Short Term Goal 3: tolerate standing for 5+ minutes with Min Ax2 during self care/functional activity, least restrictive device, and Good safety  Short Term Goal 4: perform lower body ADLs with Mod A and adaptive equipment as needed  Short Term Goal 5: perform upper body ADLs with setup  Short Term Goal 6: actively participate in 15+ minutes of therapeutic exercise/functional activity to increase overall strength/endurance needed for ADLs/IADLs  Occupational Therapy Plan  Times Per Week: 4-6  Current Treatment Recommendations: Strengthening, Balance training, Functional mobility training, Endurance training, Pain management, Safety education & training, Patient/Caregiver education & training, Equipment evaluation, education, & procurement, Positioning, Self-Care / ADL, Home management training, Cognitive/Perceptual training, Coordination training    Assessment  Activity Tolerance  Activity Tolerance: Treatment limited secondary to decreased cognition  Activity Tolerance

## 2023-12-18 NOTE — PROGRESS NOTES
Patient becoming very confused, agitated and experiencing hallucinations. Page out to Dr. Rosemary Real, awaiting response.

## 2023-12-18 NOTE — PLAN OF CARE
Problem: Pain  Goal: Verbalizes/displays adequate comfort level or baseline comfort level  Outcome: Progressing     Problem: Skin/Tissue Integrity  Goal: Absence of new skin breakdown  Description: 1. Monitor for areas of redness and/or skin breakdown  2. Assess vascular access sites hourly  3. Every 4-6 hours minimum:  Change oxygen saturation probe site  4. Every 4-6 hours:  If on nasal continuous positive airway pressure, respiratory therapy assess nares and determine need for appliance change or resting period.   Outcome: Progressing     Problem: ABCDS Injury Assessment  Goal: Absence of physical injury  Outcome: Progressing     Problem: Safety - Adult  Goal: Free from fall injury  Outcome: Progressing     Problem: Safety - Adult  Goal: Free from fall injury  Outcome: Progressing

## 2023-12-19 LAB
ALBUMIN SERPL-MCNC: 3.3 G/DL (ref 3.5–5.2)
ALP SERPL-CCNC: 88 U/L (ref 35–104)
ALT SERPL-CCNC: 24 U/L (ref 5–33)
ANION GAP SERPL CALCULATED.3IONS-SCNC: 9 MMOL/L (ref 9–17)
AST SERPL-CCNC: 32 U/L
BASOPHILS # BLD: 0.03 K/UL (ref 0–0.2)
BASOPHILS NFR BLD: 1 % (ref 0–2)
BILIRUB SERPL-MCNC: 0.6 MG/DL (ref 0.3–1.2)
BUN SERPL-MCNC: 8 MG/DL (ref 8–23)
CALCIUM SERPL-MCNC: 8.9 MG/DL (ref 8.6–10.4)
CHLORIDE SERPL-SCNC: 92 MMOL/L (ref 98–107)
CO2 SERPL-SCNC: 28 MMOL/L (ref 20–31)
CREAT SERPL-MCNC: 0.6 MG/DL (ref 0.5–0.9)
EOSINOPHIL # BLD: 0.03 K/UL (ref 0–0.4)
EOSINOPHILS RELATIVE PERCENT: 1 % (ref 0–4)
ERYTHROCYTE [DISTWIDTH] IN BLOOD BY AUTOMATED COUNT: 14.2 % (ref 11.5–14.9)
GFR SERPL CREATININE-BSD FRML MDRD: >60 ML/MIN/1.73M2
GLUCOSE BLD-MCNC: 101 MG/DL (ref 65–105)
GLUCOSE BLD-MCNC: 78 MG/DL (ref 65–105)
GLUCOSE BLD-MCNC: 95 MG/DL (ref 65–105)
GLUCOSE SERPL-MCNC: 87 MG/DL (ref 70–99)
HCT VFR BLD AUTO: 27.3 % (ref 36–46)
HGB BLD-MCNC: 9.2 G/DL (ref 12–16)
LYMPHOCYTES NFR BLD: 0.99 K/UL (ref 1–4.8)
LYMPHOCYTES RELATIVE PERCENT: 31 % (ref 24–44)
MCH RBC QN AUTO: 38 PG (ref 26–34)
MCHC RBC AUTO-ENTMCNC: 33.9 G/DL (ref 31–37)
MCV RBC AUTO: 112.2 FL (ref 80–100)
MONOCYTES NFR BLD: 0.7 K/UL (ref 0.1–1.3)
MONOCYTES NFR BLD: 22 % (ref 1–7)
MORPHOLOGY: ABNORMAL
NEUTROPHILS NFR BLD: 45 % (ref 36–66)
NEUTS SEG NFR BLD: 1.45 K/UL (ref 1.3–9.1)
PLATELET # BLD AUTO: 190 K/UL (ref 150–450)
PMV BLD AUTO: 7.6 FL (ref 6–12)
POTASSIUM SERPL-SCNC: 4.2 MMOL/L (ref 3.7–5.3)
PROT SERPL-MCNC: 6 G/DL (ref 6.4–8.3)
RBC # BLD AUTO: 2.43 M/UL (ref 4–5.2)
SODIUM SERPL-SCNC: 129 MMOL/L (ref 135–144)
WBC OTHER # BLD: 3.2 K/UL (ref 3.5–11)

## 2023-12-19 PROCEDURE — 36415 COLL VENOUS BLD VENIPUNCTURE: CPT

## 2023-12-19 PROCEDURE — 99232 SBSQ HOSP IP/OBS MODERATE 35: CPT | Performed by: INTERNAL MEDICINE

## 2023-12-19 PROCEDURE — 6370000000 HC RX 637 (ALT 250 FOR IP): Performed by: INTERNAL MEDICINE

## 2023-12-19 PROCEDURE — 99232 SBSQ HOSP IP/OBS MODERATE 35: CPT | Performed by: PHYSICAL MEDICINE & REHABILITATION

## 2023-12-19 PROCEDURE — 80053 COMPREHEN METABOLIC PANEL: CPT

## 2023-12-19 PROCEDURE — 97116 GAIT TRAINING THERAPY: CPT

## 2023-12-19 PROCEDURE — 97530 THERAPEUTIC ACTIVITIES: CPT

## 2023-12-19 PROCEDURE — 82947 ASSAY GLUCOSE BLOOD QUANT: CPT

## 2023-12-19 PROCEDURE — 92526 ORAL FUNCTION THERAPY: CPT

## 2023-12-19 PROCEDURE — 6360000002 HC RX W HCPCS: Performed by: NURSE PRACTITIONER

## 2023-12-19 PROCEDURE — 2060000000 HC ICU INTERMEDIATE R&B

## 2023-12-19 PROCEDURE — 6370000000 HC RX 637 (ALT 250 FOR IP): Performed by: NURSE PRACTITIONER

## 2023-12-19 PROCEDURE — 2580000003 HC RX 258: Performed by: NURSE PRACTITIONER

## 2023-12-19 PROCEDURE — 97535 SELF CARE MNGMENT TRAINING: CPT

## 2023-12-19 PROCEDURE — 85025 COMPLETE CBC W/AUTO DIFF WBC: CPT

## 2023-12-19 PROCEDURE — 2580000003 HC RX 258: Performed by: INTERNAL MEDICINE

## 2023-12-19 RX ADMIN — LISINOPRIL 10 MG: 10 TABLET ORAL at 08:25

## 2023-12-19 RX ADMIN — Medication 3 MG: at 19:57

## 2023-12-19 RX ADMIN — CARVEDILOL 6.25 MG: 6.25 TABLET, FILM COATED ORAL at 17:14

## 2023-12-19 RX ADMIN — LEVOTHYROXINE SODIUM 100 MCG: 0.1 TABLET ORAL at 06:25

## 2023-12-19 RX ADMIN — CARVEDILOL 6.25 MG: 6.25 TABLET, FILM COATED ORAL at 08:26

## 2023-12-19 RX ADMIN — SODIUM CHLORIDE: 9 INJECTION, SOLUTION INTRAVENOUS at 15:29

## 2023-12-19 RX ADMIN — METHADONE HYDROCHLORIDE 10 MG: 10 TABLET ORAL at 19:57

## 2023-12-19 RX ADMIN — DILTIAZEM HYDROCHLORIDE 60 MG: 60 TABLET, FILM COATED ORAL at 06:25

## 2023-12-19 RX ADMIN — SODIUM CHLORIDE, PRESERVATIVE FREE 10 ML: 5 INJECTION INTRAVENOUS at 20:01

## 2023-12-19 RX ADMIN — ENOXAPARIN SODIUM 40 MG: 100 INJECTION SUBCUTANEOUS at 08:27

## 2023-12-19 RX ADMIN — CEFTRIAXONE SODIUM 1000 MG: 1 INJECTION, POWDER, FOR SOLUTION INTRAMUSCULAR; INTRAVENOUS at 02:35

## 2023-12-19 RX ADMIN — BUSPIRONE HYDROCHLORIDE 30 MG: 15 TABLET ORAL at 19:57

## 2023-12-19 RX ADMIN — DILTIAZEM HYDROCHLORIDE 60 MG: 60 TABLET, FILM COATED ORAL at 12:50

## 2023-12-19 RX ADMIN — ASPIRIN 81 MG: 81 TABLET, COATED ORAL at 08:26

## 2023-12-19 RX ADMIN — METHADONE HYDROCHLORIDE 10 MG: 10 TABLET ORAL at 08:26

## 2023-12-19 RX ADMIN — DILTIAZEM HYDROCHLORIDE 60 MG: 60 TABLET, FILM COATED ORAL at 19:58

## 2023-12-19 RX ADMIN — PANTOPRAZOLE SODIUM 40 MG: 40 TABLET, DELAYED RELEASE ORAL at 08:25

## 2023-12-19 RX ADMIN — BUSPIRONE HYDROCHLORIDE 30 MG: 15 TABLET ORAL at 08:25

## 2023-12-19 ASSESSMENT — PAIN DESCRIPTION - LOCATION: LOCATION: BACK;NECK

## 2023-12-19 ASSESSMENT — PAIN DESCRIPTION - DESCRIPTORS: DESCRIPTORS: DISCOMFORT;ACHING

## 2023-12-19 ASSESSMENT — PAIN DESCRIPTION - ORIENTATION: ORIENTATION: LOWER

## 2023-12-19 ASSESSMENT — PAIN SCALES - GENERAL: PAINLEVEL_OUTOF10: 6

## 2023-12-19 NOTE — PROGRESS NOTES
Rehabilitation Physical Therapy    Date: 2023  Patient Name: Ras Smith        MRN: [id number]    : 1960  (59 y.o.)  Gender: female   Referring Practitioner: Keya TRUJILLO- [place]  Diagnosis: decreased appetitie       Discharge Recommendations:  Patient would benefit from continued therapy after discharge, Therapy recommended at discharge, Continue to assess pending progress  Equipment Needed: No    Assessment  Assessment  Activity Tolerance: Treatment limited secondary to decreased cognition;Treatment limited secondary to medical complications (UTI causing confusion and hallucinations)  Discharge Recommendations: Patient would benefit from continued therapy after discharge; Therapy recommended at discharge;Continue to assess pending progress    Plan  Physical Therapy Plan  General Plan:  (5-7 treatments/ week)  Specific Instructions for Next Treatment: advance transfers w/ 2 assist using olga lidia petit, instruct in exercise program, advance to gait as tolerated, co treat w/ OT  Current Treatment Recommendations: Strengthening, Home exercise program, Balance training, Gait training, Functional mobility training, Safety education & training, Transfer training, Patient/Caregiver education & training, Equipment evaluation, education, & procurement, Endurance training, Therapeutic activities, Co-Treatment     Restrictions  Restrictions/Precautions: Fall Risk, Up as Tolerated  Implants present? : Metal implants (cervical neck surgery)  Other position/activity restrictions: NO BP, IV sticks or venipunctures right arm    Subjective  Subjective: Pt reports feeling okay. Upon arrival pt appears to remember writer and Joyce Heritage from previous day however, pt started to hallucinate and call her cat to come. Pt became angry with writer when writer asked pt who she was talking to. Pt reports that writer appeared to be pt's granddaughter Cheli Garvey. Writer attempt to re-orient pt to writer, room and facility.   Pain: Pt reports and increase sitting tolerance to 15 minutes w/ supervision  Short Term Goal 6: pt to demonstrate transfers sit <> stand w/ min x 2 advancing to wheeled walker when medically ready  Short Term Goal 7: pt to demonstrate good standing balance w/ knee control on the Nathalie Stedy and increase standing tolerance to 3 minutes  Short Term Goal 8: pt to advance to and demonstrate gait w/ wheeled walker 10-20' w/ min x 2 and W/C follow after demonstrating knee control on the Nathalie Stedy     [facility] Basic Mobility - Inpatient  How much help is needed turning from your back to your side while in a flat bed without using bedrails?: A Little  How much help is needed moving from lying on your back to sitting on the side of a flat bed without using bedrails?: A Little  How much help is needed moving to and from a bed to a chair?: Total  How much help is needed standing up from a chair using your arms?: Total  How much help is needed walking in hospital room?: Total  How much help is needed climbing 3-5 steps with a railing?: Total  AM-PAC Inpatient Mobility Raw Score : 10  AM-PAC Inpatient T-Scale Score : 32.29  Mobility Inpatient CMS 0-100% Score: 76.75  Mobility Inpatient CMS G-Code Modifier : CL     PT Individual Minutes  Time In: 0946  Time Out: 1018  Minutes: 32    Electronically signed by Kayla Oliveros PTA on 12/19/23 at 1:40 PM EST

## 2023-12-19 NOTE — PLAN OF CARE
Problem: Discharge Planning  Goal: Discharge to home or other facility with appropriate resources  Outcome: Progressing  Dc barrier: iv atb tx, hallucinations noted still     Problem: Pain  Goal: Verbalizes/displays adequate comfort level or baseline comfort level  Outcome: Progressing  Pt medicated with pain medication prn. Assessed all pain characteristics including level, type, location, frequency, and onset. Non-pharmacologic interventions offered to pt as well. Pt states pain is tolerable at this time. Problem: Skin/Tissue Integrity  Goal: Absence of new skin breakdown  Description: 1. Monitor for areas of redness and/or skin breakdown  2. Assess vascular access sites hourly  3. Every 4-6 hours minimum:  Change oxygen saturation probe site  4. Every 4-6 hours:  If on nasal continuous positive airway pressure, respiratory therapy assess nares and determine need for appliance change or resting period. Outcome: Progressing  Skin assessment complete. Waffle mattress in place. Coccyx reddened. Sensicare applied PRN. Turned and repositioned every two hours. Area kept free from moisture. Proper nourishment and fluids encouraged, as appropriate. Will continue to monitor for additional needs and changes in skin breakdown. Problem: Safety - Adult  Goal: Free from fall injury  Outcome: Progressing  Pt assessed as a fall risk this shift. Remains free from falls and accidental injury at this time. Fall precautions in place, including falling star sign and fall risk band on pt. Floor free from obstacles, and bed is locked and in lowest position. Adequate lighting provided. Pt encouraged to call before getting OOB for any need. Bed alarm activated. Will continue to monitor needs during hourly rounding, and reinforce education on use of call light.      Problem: Nutrition Deficit:  Goal: Optimize nutritional status  Outcome: Progressing  Pt eating and drinking well

## 2023-12-19 NOTE — PROGRESS NOTES
[address]    Progress note            Date:   12/19/2023  Patient name:  Sudha Rangel  Date of admission:  12/12/2023  2:54 PM  MRN:   [id number]  Account:  [de-identified]  YOB: 1960  PCP:    Gabriel Lutz MD  Room:   7494/7101-90  Code Status:    Full Code    Chief Complaint:     Chief Complaint   Patient presents with    Fatigue       History Obtained From:     patient, electronic medical record    History of Present Illness: The patient is a 59 y.o. Non- / non  female who presents with Fatigue   and she is admitted to the hospital for the management of fatigue, elevated troponin  Estefanía El is a 52-year-old ill-appearing, malnourished, pleasant lady who presents with difficulty swallowing, decreased energy, decreased appetite, and decreased motivation. She has a history of multiple spinal surgeries, cauda equina syndrome, breast cancer, GERD, hypertension, hypothyroidism, and osteoporosis. She works with an outpatient pain clinic for the management of severe back pain. She takes hydromorphone and hydrocodone. Her pain is not well-managed. She also has a history of alcohol abuse. The patient and her daughter state she drastically scaled back her alcohol use 4 years ago. She states she has a small drink twice a month. The patient reports a severely decreased appetite with difficulty swallowing. She reports extreme fatigue and difficulty ambulating in the home. Denies fever, chills, chest pain, cough, abdominal pain, nausea, diarrhea, and urinary symptoms. There are no aggravating or alleviating factors.     Patient, underwent CT abdomen pelvis, which was negative  Getting evaluated by speech pathologist      Past Medical History:     Past Medical History:   Diagnosis Date    Anxiety     Arthritis     At maximum risk for fall 12/29/2021    caudal equina syndrome and cervical stenosis    Breast cancer ([address] [facility])     Cancer hypokalemia, hypomagnesemia, replacing lites  Readings of high blood pressure, hydralazine as needed  Elevated troponins, no chest pain, cardiology consulted in emergency room, no indication for heparin drip, echo ordered  Patient feeling depressed, consulted psychiatry  History of breast cancer    December 19  Seen and examined face-to-face,  Elevated troponin, possible type II MI, cardiology adjusted medications, no interventions     Neurology seen , ordered MRI , results normal   She sees pain management and on heavy pain medications, on norco and methadone, will dc norco     PT OT,  Overall moderate malnutrition,  Seen by speech, continued on a special diet,  Failure to thrive,  Mostly bedbound,  PM&R consulted , advised admission to ARU     UTI , started on rocephin ,   Mild confusion today   Will dc to ARU next am       Consultations:   IP CONSULT TO INTERNAL MEDICINE  IP CONSULT TO CARDIOLOGY  IP CONSULT TO PSYCHIATRY  IP CONSULT TO PSYCHIATRY  IP CONSULT TO NEUROLOGY  IP CONSULT TO PHYSICAL MEDICINE REHAB  IP CONSULT TO DIETITIAN  IP CONSULT TO SOCIAL WORK  IP CONSULT TO INTERNAL MEDICINE    Patient is admitted as inpatient status because of co-morbidities listed above, severity of signs and symptoms as outlined, requirement for current medical therapies and most importantly because of direct risk to patient if care not provided in a hospital setting. Timothy Wray MD  12/19/2023  4:12 PM    Copy sent to Dr. Rolando Gaines MD    Please note that this chart was generated using voice recognition Dragon dictation software. Although every effort was made to ensure the accuracy of this automated transcription, some errors in transcription may have occurred.

## 2023-12-19 NOTE — PROGRESS NOTES
Department of Psychiatry  Consult Progress Note  12/19/2023    Patient Name: Ciaran Clark    Reason for initial consult:  Anxiety, depression          Interval History:      No acute psychiatric events overnight. Patient reports that had several episodes of \"anger\" today, but is unable to recall what she was angry about. She states that her current mood is fine, and that she slept OK last night. She denies any auditory or visual hallucinations. She is oriented to name, place, city, month, and year. She does recall that she was confused yesterday. She denies SI/HI. HISTORY OF PRESENT ILLNESS ON ADMISSION:    The patient is a 59 y.o. female with significant past psychiatric history of depression who presents with fatigue. The patient has a medical history significant for multiple spinal surgeries, cauda equina syndrome, breast cancer, GERD, HTN, hypothyroidism, osteoporosis and severe back pain. She is on long-term opioid therapy. She has a history of alcohol abuse. She has elevated troponins. The patient sodium was normal on presentation but she is hyponatremic and hypokalemic at this time. Her TSH is normal.  Her urine drug screen not been obtained. Her liver enzymes are elevated. -Seen at bedside.  present. Has some depression and anxiety.   -In the last couple of days she had difficulty using the walker.   -Last drink was over 2 days ago per patient.   -Had quit taking Buspirone because of lack of efficacy. The patient is not currently receiving care for the above psychiatric illness. Psychiatric Review of Systems           Obsessions and Compulsions: Denies       Harper or Hypomania: Denies     Hallucinations: Denies     Panic Attacks:  Denies     Delusions:  Denies     Phobias:  Denies     Trauma: Denies        Substance Abuse History:  ETOH: History of heavy drinking.    Marijuana: occasional  Opiates: Denies abusing opiates but is prescribed Norco long-term  Other Drugs:

## 2023-12-19 NOTE — PROGRESS NOTES
injection 5-40 mL, 5-40 mL, IntraVENous, PRN  0.9 % sodium chloride infusion, , IntraVENous, PRN  potassium chloride (KLOR-CON M) extended release tablet 40 mEq, 40 mEq, Oral, PRN **OR** potassium bicarb-citric acid (EFFER-K) effervescent tablet 40 mEq, 40 mEq, Oral, PRN **OR** potassium chloride 10 mEq/100 mL IVPB (Peripheral Line), 10 mEq, IntraVENous, PRN  magnesium sulfate 2000 mg in water 50 mL IVPB, 2,000 mg, IntraVENous, PRN  enoxaparin (LOVENOX) injection 40 mg, 40 mg, SubCUTAneous, Daily  ondansetron (ZOFRAN-ODT) disintegrating tablet 4 mg, 4 mg, Oral, Q8H PRN **OR** ondansetron (ZOFRAN) injection 4 mg, 4 mg, IntraVENous, Q6H PRN  polyethylene glycol (GLYCOLAX) packet 17 g, 17 g, Oral, Daily PRN  acetaminophen (TYLENOL) tablet 650 mg, 650 mg, Oral, Q6H PRN **OR** acetaminophen (TYLENOL) suppository 650 mg, 650 mg, Rectal, Q6H PRN  melatonin tablet 3 mg, 3 mg, Oral, Nightly  dextrose bolus 10% 125 mL, 125 mL, IntraVENous, PRN **OR** dextrose bolus 10% 250 mL, 250 mL, IntraVENous, PRN      Diagnostics:     CBC:   Recent Labs     12/17/23  0608 12/18/23  1047 12/19/23  0530   WBC 3.8 2.9* 3.2*   RBC 2.74* 2.67* 2.43*   HGB 10.4* 10.1* 9.2*   HCT 30.5* 30.3* 27.3*   .7* 113.7* 112.2*   RDW 13.9 14.0 14.2   * 188 190     BMP:    Recent Labs     12/17/23  0608 12/18/23  1047 12/19/23  0530   GLUCOSE 101* 285* 87   BUN 2* 6* 8   CREATININE 0.5 0.6 0.6   CALCIUM 9.0 8.6 8.9   * 129* 129*   K 3.5* 3.5* 4.2   CL 94* 93* 92*   CO2 27 27 28   ANIONGAP 8* 9 9   LABGLOM >60 >60 >60     HbA1c:   Lab Results   Component Value Date    LABA1C 3.9 (L) 12/18/2023     BNP: No results for input(s): \"BNP\" in the last 72 hours. PT/INR: No results for input(s): \"PROTIME\", \"INR\" in the last 72 hours. APTT: No results for input(s): \"APTT\" in the last 72 hours. CARDIAC ENZYMES: No results for input(s): \"CKMB\", \"CKMBINDEX\", \"TROPONINT\", \"TROPHS\", \"TROPII\" in the last 72 hours.     Invalid input(s): \"CKTOTAL;3\"   FASTING LIPID PANEL:  Lab Results   Component Value Date    CHOL 198 07/23/2020     (A) 07/23/2020    TRIG 96 07/23/2020     LIVER PROFILE:   Recent Labs     12/17/23  0608 12/18/23  1047 12/19/23  0530   AST 33* 34* 32*   ALT 28 24 24   BILITOT 0.8 0.7 0.6   ALKPHOS 101 95 88        Radiology:        Impression/Plan:    Functional quadriplegia: recent functional decline in mobility and ADLs  Failure to thrive  UTI: on Keflex - patient having difficulty swallowing capsules.  Dysphagia: MBSS 12/14 - easy to chew, thin liquids. SLP discharged from treatment  Chronic pain: on methadone, baclofen  Depression:  on buspirone, sertraline  Hypothyroidism: on levothyroxine  Hypokalemia:  Hypomagnesemia:  Hyponatremia  HTN: on cardizem, carvedilol, lisinopril  Insomnia: on melatonin  GERD: on pantoprazole  Spasticity  Hx cervical myelopathy: s/p surgical decompression  Hx lumbar stenosis and cauda equina syndrome: s/p lumbar laminectomy    Recommendation:  Discussed with Dr. Meredith today. He is monitoring mental status but anticipates possible discharge to acute rehab tomorrow. Currently requiring 1:1 sitter. Will follow.   DVT Prophylaxis: on Lovenox        Electronically signed by SLICK JOHNSON MD on 12/19/2023 at 2:26 PM       This note is created with the assistance of a speech recognition program.  While intending to generate a document that actually reflects the content of the visit, the document can still have some errors including those of syntax and sound a like substitutions which may escape proof reading.  In such instances, actual meaning can be extrapolated by contextual diversion.

## 2023-12-19 NOTE — PROGRESS NOTES
Pt got all meds down except keflex capsule, stated she cannot swallow that, ended up throwing pill up. Tried to get pt to take with applesauce or pudding, she refused. NP notified.

## 2023-12-19 NOTE — CARE COORDINATION
Writer is following for potential discharge to ARU. Pt was scheduled to transfer 12/18 and started displaying new behaviors, ARU was unable to accept. Pt has tested positive for a UTI. Writer placed call to Jenaro in ARU for update. No answer, voicemail left.   Electronically signed by SURINDER Aparicio on 12/19/2023 at 4:05 PM

## 2023-12-19 NOTE — PROGRESS NOTES
[facility] [place]   INPATIENT OCCUPATIONAL THERAPY  PROGRESS NOTE  Date: 2023  Patient Name: Maryann Sol       Room: 6513/6707-97  MRN: [id number]    : 1960  (59 y.o.)  Gender: female   Referring Practitioner: Aly Weems MD  Diagnosis: Elevated troponin      Discharge Recommendations:  Further Occupational Therapy is recommended upon facility discharge. OT Equipment Recommendations  Other: TBD    Restrictions/Precautions  Restrictions/Precautions  Restrictions/Precautions: Fall Risk;Up as Tolerated  Required Braces or Orthoses?: No  Implants present? : Metal implants (cervical neck surgery)  Position Activity Restriction  Other position/activity restrictions: NO BP, IV sticks or venipunctures right arm    O2 Device: None (Room air)    Subjective  Subjective  Pain: Pt reports 7/10 pain in neck radiating to feet. Comments: DENA Weems treatment, co-tx with Kayla PEREZ for optimal pt safety. Pt agreeable, confusion throughout. Objective  Orientation  Overall Orientation Status: Impaired  Orientation Level: Oriented to person;Oriented to time;Disoriented to situation;Oriented to place  Cognition  Overall Cognitive Status: Exceptions  Arousal/Alertness: Inconsistent responses to stimuli  Following Commands: Follows one step commands with repetition; Follows one step commands with increased time; Inconsistently follows commands  Attention Span: Attends with cues to redirect  Memory: Decreased recall of recent events;Decreased short term memory;Decreased long term memory  Safety Judgement: Decreased awareness of need for assistance;Decreased awareness of need for safety  Problem Solving: Assistance required to generate solutions;Assistance required to implement solutions;Assistance required to identify errors made;Assistance required to correct errors made;Decreased awareness of errors  Insights: Not aware of deficits  Initiation: Requires cues for

## 2023-12-19 NOTE — PROGRESS NOTES
Comprehensive Nutrition Assessment    Type and Reason for Visit:  Reassess    Nutrition Recommendations/Plan:   Will continue Easy to Chew diet  Will change supplements to Magic Cup twice daily, Glucerna once daily     Malnutrition Assessment:  Malnutrition Status: At risk for malnutrition (Comment) (12/13/23 0400)    Context:  Chronic Illness     Findings of the 6 clinical characteristics of malnutrition:  Energy Intake:  Mild decrease in energy intake (Comment)  Weight Loss:   (11% wt loss over 9 months)     Body Fat Loss:  Unable to assess     Muscle Mass Loss:  Unable to assess    Fluid Accumulation:  No significant fluid accumulation     Strength:  Not Performed    Nutrition Assessment:    Pt's intake is variable (25-75%). She is confused and ordering cheeseburgers and chicken wraps but can not chew these things. Discussed diet with speech who requests pt be continued on an Easy to [employer]. Observed pt with lunch tray consumed small amounts of mashed potatoes and all of pudding. Nutrition Related Findings:    Na 129, Glu 87, Meds: Reviewed, BM 12/16 Wound Type: None       Current Nutrition Intake & Therapies:    Average Meal Intake: 1-25%, %  Average Supplements Intake: 26-50%  ADULT DIET; Easy to Chew  ADULT ORAL NUTRITION SUPPLEMENT; Breakfast, Lunch, Dinner; Diabetic Oral Supplement    Anthropometric Measures:  Height: 154.9 cm (5' 1\")  Ideal Body Weight (IBW): 105 lbs (48 kg)    Admission Body Weight: 52.6 kg (116 lb)  Current Body Weight: 54 kg (119 lb),   IBW. Weight Source: Not Specified  Current BMI (kg/m2): 22.5  Usual Body Weight: 62.1 kg (137 lb) (3/23)  % Weight Change (Calculated): -11.5                    BMI Categories: Normal Weight (BMI 18.5-24. 9)    Estimated Daily Nutrient Needs:  Energy Requirements Based On: Formula  Weight Used for Energy Requirements: Current  Energy (kcal/day): [place] x 1.3= 1400 kcal  Weight Used for Protein Requirements: Current  Protein (g/day):

## 2023-12-20 ENCOUNTER — HOSPITAL ENCOUNTER (INPATIENT)
Age: 63
LOS: 13 days | Discharge: HOME | DRG: 948 | End: 2024-01-02
Attending: PHYSICAL MEDICINE & REHABILITATION | Admitting: PHYSICAL MEDICINE & REHABILITATION
Payer: MEDICARE

## 2023-12-20 VITALS
WEIGHT: 119.71 LBS | BODY MASS INDEX: 22.6 KG/M2 | RESPIRATION RATE: 16 BRPM | HEIGHT: 61 IN | DIASTOLIC BLOOD PRESSURE: 72 MMHG | SYSTOLIC BLOOD PRESSURE: 132 MMHG | HEART RATE: 87 BPM | OXYGEN SATURATION: 98 % | TEMPERATURE: 98.6 F

## 2023-12-20 DIAGNOSIS — G82.50 TETRAPARESIS (HCC): Primary | ICD-10-CM

## 2023-12-20 DIAGNOSIS — M79.89 ARM SWELLING: ICD-10-CM

## 2023-12-20 DIAGNOSIS — I80.8 SUPERFICIAL THROMBOPHLEBITIS OF RIGHT UPPER EXTREMITY: ICD-10-CM

## 2023-12-20 DIAGNOSIS — R41.89 IMPAIRED COGNITION: ICD-10-CM

## 2023-12-20 PROBLEM — R53.2 QUADRIPLEGIA, FUNCTIONAL (HCC): Status: ACTIVE | Noted: 2023-12-20

## 2023-12-20 LAB
GLUCOSE BLD-MCNC: 101 MG/DL (ref 65–105)
GLUCOSE BLD-MCNC: 103 MG/DL (ref 65–105)
GLUCOSE BLD-MCNC: 96 MG/DL (ref 65–105)
MICROORGANISM SPEC CULT: ABNORMAL
MICROORGANISM SPEC CULT: ABNORMAL
SPECIMEN DESCRIPTION: ABNORMAL

## 2023-12-20 PROCEDURE — 99239 HOSP IP/OBS DSCHRG MGMT >30: CPT | Performed by: INTERNAL MEDICINE

## 2023-12-20 PROCEDURE — 82947 ASSAY GLUCOSE BLOOD QUANT: CPT

## 2023-12-20 PROCEDURE — 1180000000 HC REHAB R&B

## 2023-12-20 PROCEDURE — 99232 SBSQ HOSP IP/OBS MODERATE 35: CPT | Performed by: PHYSICAL MEDICINE & REHABILITATION

## 2023-12-20 PROCEDURE — 97530 THERAPEUTIC ACTIVITIES: CPT

## 2023-12-20 PROCEDURE — 2580000003 HC RX 258: Performed by: INTERNAL MEDICINE

## 2023-12-20 PROCEDURE — 97116 GAIT TRAINING THERAPY: CPT

## 2023-12-20 PROCEDURE — 6370000000 HC RX 637 (ALT 250 FOR IP): Performed by: INTERNAL MEDICINE

## 2023-12-20 PROCEDURE — 2580000003 HC RX 258: Performed by: NURSE PRACTITIONER

## 2023-12-20 PROCEDURE — 6360000002 HC RX W HCPCS: Performed by: NURSE PRACTITIONER

## 2023-12-20 PROCEDURE — 6370000000 HC RX 637 (ALT 250 FOR IP): Performed by: NURSE PRACTITIONER

## 2023-12-20 PROCEDURE — 97535 SELF CARE MNGMENT TRAINING: CPT

## 2023-12-20 RX ORDER — ACETAMINOPHEN 325 MG/1
650 TABLET ORAL EVERY 6 HOURS PRN
Status: DISCONTINUED | OUTPATIENT
Start: 2023-12-20 | End: 2023-12-21

## 2023-12-20 RX ORDER — ACETAMINOPHEN 325 MG/1
650 TABLET ORAL EVERY 6 HOURS PRN
Status: CANCELLED | OUTPATIENT
Start: 2023-12-20

## 2023-12-20 RX ORDER — LANOLIN ALCOHOL/MO/W.PET/CERES
3 CREAM (GRAM) TOPICAL NIGHTLY
Status: CANCELLED | OUTPATIENT
Start: 2023-12-20

## 2023-12-20 RX ORDER — POLYETHYLENE GLYCOL 3350 17 G/17G
17 POWDER, FOR SOLUTION ORAL DAILY PRN
Status: CANCELLED | OUTPATIENT
Start: 2023-12-20

## 2023-12-20 RX ORDER — SENNOSIDES A AND B 8.6 MG/1
2 TABLET, FILM COATED ORAL DAILY PRN
Status: DISCONTINUED | OUTPATIENT
Start: 2023-12-20 | End: 2024-01-02 | Stop reason: HOSPADM

## 2023-12-20 RX ORDER — LISINOPRIL 10 MG/1
10 TABLET ORAL DAILY
Status: CANCELLED | OUTPATIENT
Start: 2023-12-21

## 2023-12-20 RX ORDER — ASPIRIN 81 MG/1
81 TABLET ORAL DAILY
Status: CANCELLED | OUTPATIENT
Start: 2023-12-21

## 2023-12-20 RX ORDER — BUSPIRONE HYDROCHLORIDE 15 MG/1
30 TABLET ORAL 2 TIMES DAILY
Status: DISCONTINUED | OUTPATIENT
Start: 2023-12-20 | End: 2024-01-02 | Stop reason: HOSPADM

## 2023-12-20 RX ORDER — LEVOTHYROXINE SODIUM 0.1 MG/1
100 TABLET ORAL DAILY
Status: CANCELLED | OUTPATIENT
Start: 2023-12-21

## 2023-12-20 RX ORDER — DILTIAZEM HYDROCHLORIDE 60 MG/1
60 TABLET, FILM COATED ORAL EVERY 8 HOURS SCHEDULED
Status: DISCONTINUED | OUTPATIENT
Start: 2023-12-20 | End: 2024-01-02 | Stop reason: HOSPADM

## 2023-12-20 RX ORDER — METHADONE HYDROCHLORIDE 10 MG/1
10 TABLET ORAL 2 TIMES DAILY
Status: CANCELLED | OUTPATIENT
Start: 2023-12-20

## 2023-12-20 RX ORDER — PANTOPRAZOLE SODIUM 40 MG/1
40 TABLET, DELAYED RELEASE ORAL DAILY
Status: CANCELLED | OUTPATIENT
Start: 2023-12-21

## 2023-12-20 RX ORDER — ENOXAPARIN SODIUM 100 MG/ML
40 INJECTION SUBCUTANEOUS DAILY
Status: CANCELLED | OUTPATIENT
Start: 2023-12-21

## 2023-12-20 RX ORDER — LEVOTHYROXINE SODIUM 0.1 MG/1
100 TABLET ORAL DAILY
Status: DISCONTINUED | OUTPATIENT
Start: 2023-12-21 | End: 2024-01-02 | Stop reason: HOSPADM

## 2023-12-20 RX ORDER — POLYETHYLENE GLYCOL 3350 17 G/17G
17 POWDER, FOR SOLUTION ORAL DAILY
Status: DISCONTINUED | OUTPATIENT
Start: 2023-12-21 | End: 2024-01-02 | Stop reason: HOSPADM

## 2023-12-20 RX ORDER — LANOLIN ALCOHOL/MO/W.PET/CERES
3 CREAM (GRAM) TOPICAL NIGHTLY
Status: DISCONTINUED | OUTPATIENT
Start: 2023-12-20 | End: 2023-12-22

## 2023-12-20 RX ORDER — ACETAMINOPHEN 650 MG/1
650 SUPPOSITORY RECTAL EVERY 6 HOURS PRN
Status: DISCONTINUED | OUTPATIENT
Start: 2023-12-20 | End: 2023-12-21

## 2023-12-20 RX ORDER — BISACODYL 10 MG
10 SUPPOSITORY, RECTAL RECTAL DAILY PRN
Status: DISCONTINUED | OUTPATIENT
Start: 2023-12-20 | End: 2024-01-02 | Stop reason: HOSPADM

## 2023-12-20 RX ORDER — CARVEDILOL 6.25 MG/1
6.25 TABLET ORAL 2 TIMES DAILY WITH MEALS
Status: CANCELLED | OUTPATIENT
Start: 2023-12-20

## 2023-12-20 RX ORDER — PANTOPRAZOLE SODIUM 40 MG/1
40 TABLET, DELAYED RELEASE ORAL DAILY
Status: DISCONTINUED | OUTPATIENT
Start: 2023-12-21 | End: 2024-01-02 | Stop reason: HOSPADM

## 2023-12-20 RX ORDER — ACETAMINOPHEN 650 MG/1
650 SUPPOSITORY RECTAL EVERY 6 HOURS PRN
Status: CANCELLED | OUTPATIENT
Start: 2023-12-20

## 2023-12-20 RX ORDER — POLYETHYLENE GLYCOL 3350 17 G/17G
17 POWDER, FOR SOLUTION ORAL DAILY PRN
Status: DISCONTINUED | OUTPATIENT
Start: 2023-12-20 | End: 2024-01-02 | Stop reason: HOSPADM

## 2023-12-20 RX ORDER — DILTIAZEM HYDROCHLORIDE 60 MG/1
60 TABLET, FILM COATED ORAL EVERY 8 HOURS SCHEDULED
Status: CANCELLED | OUTPATIENT
Start: 2023-12-20

## 2023-12-20 RX ORDER — METHADONE HYDROCHLORIDE 10 MG/1
10 TABLET ORAL 2 TIMES DAILY
Status: DISCONTINUED | OUTPATIENT
Start: 2023-12-20 | End: 2024-01-02 | Stop reason: HOSPADM

## 2023-12-20 RX ORDER — LISINOPRIL 10 MG/1
10 TABLET ORAL DAILY
Status: DISCONTINUED | OUTPATIENT
Start: 2023-12-21 | End: 2023-12-23

## 2023-12-20 RX ORDER — CARVEDILOL 6.25 MG/1
6.25 TABLET ORAL 2 TIMES DAILY WITH MEALS
Status: DISCONTINUED | OUTPATIENT
Start: 2023-12-21 | End: 2023-12-23

## 2023-12-20 RX ORDER — ENOXAPARIN SODIUM 100 MG/ML
40 INJECTION SUBCUTANEOUS DAILY
Status: DISCONTINUED | OUTPATIENT
Start: 2023-12-21 | End: 2023-12-28

## 2023-12-20 RX ORDER — ACETAMINOPHEN 325 MG/1
650 TABLET ORAL EVERY 4 HOURS PRN
Status: DISCONTINUED | OUTPATIENT
Start: 2023-12-20 | End: 2024-01-02 | Stop reason: HOSPADM

## 2023-12-20 RX ORDER — ASPIRIN 81 MG/1
81 TABLET ORAL DAILY
Status: DISCONTINUED | OUTPATIENT
Start: 2023-12-21 | End: 2024-01-02 | Stop reason: HOSPADM

## 2023-12-20 RX ORDER — BUSPIRONE HYDROCHLORIDE 15 MG/1
30 TABLET ORAL 2 TIMES DAILY
Status: CANCELLED | OUTPATIENT
Start: 2023-12-20

## 2023-12-20 RX ADMIN — CARVEDILOL 6.25 MG: 6.25 TABLET, FILM COATED ORAL at 17:49

## 2023-12-20 RX ADMIN — DILTIAZEM HYDROCHLORIDE 60 MG: 60 TABLET, FILM COATED ORAL at 22:55

## 2023-12-20 RX ADMIN — CARVEDILOL 6.25 MG: 6.25 TABLET, FILM COATED ORAL at 10:30

## 2023-12-20 RX ADMIN — ENOXAPARIN SODIUM 40 MG: 100 INJECTION SUBCUTANEOUS at 10:26

## 2023-12-20 RX ADMIN — SODIUM CHLORIDE: 9 INJECTION, SOLUTION INTRAVENOUS at 11:51

## 2023-12-20 RX ADMIN — DILTIAZEM HYDROCHLORIDE 60 MG: 60 TABLET, FILM COATED ORAL at 15:24

## 2023-12-20 RX ADMIN — Medication 3 MG: at 22:55

## 2023-12-20 RX ADMIN — ASPIRIN 81 MG: 81 TABLET, COATED ORAL at 10:31

## 2023-12-20 RX ADMIN — METHADONE HYDROCHLORIDE 10 MG: 10 TABLET ORAL at 22:55

## 2023-12-20 RX ADMIN — BUSPIRONE HYDROCHLORIDE 30 MG: 15 TABLET ORAL at 22:55

## 2023-12-20 RX ADMIN — DILTIAZEM HYDROCHLORIDE 60 MG: 60 TABLET, FILM COATED ORAL at 05:43

## 2023-12-20 RX ADMIN — LISINOPRIL 10 MG: 10 TABLET ORAL at 10:29

## 2023-12-20 RX ADMIN — PANTOPRAZOLE SODIUM 40 MG: 40 TABLET, DELAYED RELEASE ORAL at 11:43

## 2023-12-20 RX ADMIN — BUSPIRONE HYDROCHLORIDE 30 MG: 15 TABLET ORAL at 10:28

## 2023-12-20 RX ADMIN — CEFTRIAXONE SODIUM 1000 MG: 1 INJECTION, POWDER, FOR SOLUTION INTRAMUSCULAR; INTRAVENOUS at 02:42

## 2023-12-20 RX ADMIN — METHADONE HYDROCHLORIDE 10 MG: 10 TABLET ORAL at 10:29

## 2023-12-20 RX ADMIN — LEVOTHYROXINE SODIUM 100 MCG: 0.1 TABLET ORAL at 05:43

## 2023-12-20 ASSESSMENT — PAIN SCALES - GENERAL
PAINLEVEL_OUTOF10: 6

## 2023-12-20 NOTE — CARE COORDINATION
Writer placed call to pt spouse to discuss discharge plan and time. No further questions at this time.   Electronically signed by SURINDER Ibarra on 12/20/2023 at 4:22 PM

## 2023-12-20 NOTE — PROGRESS NOTES
Physical Medicine & Rehabilitation  Progress Note        Admitting Physician: Param Andres MD    Primary Care Provider: Giovanna Brasher MD     Chief Complaint: Fatigue    Brief History:    This is a follow up to the initial consult on Ms. Rajani Iglesias is a 63 y.o. right handed female who was admitted to [facility] on 12/12/2023 with Fatigue    Patient with functional decline and impaired ambulation with known history of cauda equina syndrome and cervical myelopathy. She is being treated for delirium - was diagnosed with UTI. Transitioned to Rocephin planned until 12/24/23 as she was unable to swallow capsules.    Cardiology: for outpatient follow up after adding Coreg and Cardizem to her home Lisinopril.     Chronic pain is being managed with methadone.     Psychiatry: evaluated and diagnosed with depression and anxiety unspecified. Zoloft on hold for hyponatremia. Continuing Buspar. Recommending outpatient follow up.     Subjective:  The patient is resting comfortably. The patient's mobility is unchanged. Her cognition is improved today but still exhibiting some intermittent confusion.       ROS:  Constitutional: negative for anorexia, chills, fatigue, fevers, sweats and weight loss  Eyes: negative for redness and visual disturbance  Ears, nose, mouth, throat, and face: negative for earaches, epistaxis, sore throat and tinnitus  Respiratory: negative for cough and shortness of breath  Cardiovascular: negative for chest pain, dyspnea and palpitations  Gastrointestinal: negative for abdominal pain, change in bowel habits, constipation, nausea and vomiting  Genitourinary:negative for dysuria, frequency, hesitancy and urinary incontinence  Integument/breast: negative for pruritus and rash  Musculoskeletal:negative for stiff joints  Neurological: negative for dizziness, headaches   Behavioral/Psych: negative for decreased appetite, depression     Rehabilitation:       PT:      Bed Mobility  Yes  Interventions:   Rolling: Supervision  Supine to Sit: Stand-by assistance  Sit to Supine: Stand-by assistance  Scooting: Maximum assistance, Assist X2 (to [facility], pt attempt x2 but was unable to scoot to [facility])     Transfer Training  Transfer Training: Yes  Overall Level of Assistance: Moderate assistance, Assist X2  Sit to Stand: Minimum assistance, Assist X1 (2nd person close SBA with IV line management)  Stand to Sit: Minimum assistance, Assist X1 (2nd person SBA)  Stand Pivot Transfers: Minimum assistance, Assist X2, Additional time, Adaptive equipment (standing with RW, pt confused and attempts to turn with IV line tangled while leaving RW and walking around - 2 Assist)  Toilet Transfer: Minimum assistance, Assist X2 (Due to Confusion/Hallucinations)        Gait Training  Gait Training: Yes  Overall Level of Assistance: Moderate assistance, Assist X1 (2nd person close SBA for line management and safety due to confusion.)  Distance (ft): 6 Feet  Assistive Device: Gait belt, Walker, rolling  Interventions: Safety awareness training, Verbal cues, Manual cues  Base of Support: Narrowed  Speed/Sharon: Slow  Step Length: Left shortened, Right shortened  Gait Abnormalities: Decreased step clearance, Path deviations, Step to gait    OT:   ADLS-   ADL  Feeding: Setup  Grooming: Minimal assistance  Grooming Skilled Clinical Factors: Increased cueing for initiation and sequencing with completion of oral care task while standing sinkside. UE Bathing: Minimal assistance  UE Bathing Skilled Clinical Factors: seated in bedside chair to complete, CHG soap utilized  LE Bathing: Maximum assistance  LE Bathing Skilled Clinical Factors: standing with 2 person assistance to wash juanita area/buttocks  UE Dressing: Minimal assistance  UE Dressing Skilled Clinical Factors: to thread BUEs into fresh gown  LE Dressing:  Moderate assistance  LE Dressing Skilled Clinical Factors: Pt demonstrated ability to reach and adjust B socks while seated EOB and thread suppository 650 mg, 650 mg, Rectal, Q6H PRN  melatonin tablet 3 mg, 3 mg, Oral, Nightly  dextrose bolus 10% 125 mL, 125 mL, IntraVENous, PRN **OR** dextrose bolus 10% 250 mL, 250 mL, IntraVENous, PRN      Diagnostics:     CBC:   Recent Labs     12/18/23  1047 12/19/23  0530   WBC 2.9* 3.2*   RBC 2.67* 2.43*   HGB 10.1* 9.2*   HCT 30.3* 27.3*   .7* 112.2*   RDW 14.0 14.2    190     BMP:    Recent Labs     12/18/23  1047 12/19/23  0530   GLUCOSE 285* 87   BUN 6* 8   CREATININE 0.6 0.6   CALCIUM 8.6 8.9   * 129*   K 3.5* 4.2   CL 93* 92*   CO2 27 28   ANIONGAP 9 9   LABGLOM >60 >60     HbA1c:   Lab Results   Component Value Date    LABA1C 3.9 (L) 12/18/2023     BNP: No results for input(s): \"BNP\" in the last 72 hours. PT/INR: No results for input(s): \"PROTIME\", \"INR\" in the last 72 hours. APTT: No results for input(s): \"APTT\" in the last 72 hours. CARDIAC ENZYMES: No results for input(s): \"CKMB\", \"CKMBINDEX\", \"TROPONINT\", \"TROPHS\", \"TROPII\" in the last 72 hours. Invalid input(s): \"CKTOTAL;3\"   FASTING LIPID PANEL:  Lab Results   Component Value Date    CHOL 198 07/23/2020     (A) 07/23/2020    TRIG 96 07/23/2020     LIVER PROFILE:   Recent Labs     12/18/23  1047 12/19/23  0530   AST 34* 32*   ALT 24 24   BILITOT 0.7 0.6   ALKPHOS 95 88        Radiology:        Impression/Plan:    Functional quadriplegia: recent functional decline in mobility and ADLs  Failure to thrive  UTI: on Rocephin until 12/24  Dysphagia: MBSS 12/14 - easy to chew, thin liquids.  SLP discharged from treatment  Chronic pain: on methadone, baclofen  Depression:  on buspirone, sertraline  Hypothyroidism: on levothyroxine  Hypokalemia:  Hypomagnesemia:  Hyponatremia  HTN: on cardizem, carvedilol, lisinopril  Insomnia: on melatonin  GERD: on pantoprazole  Spasticity: follows with me as outpatient, planning for Botox  Hx cervical myelopathy: s/p surgical decompression  Hx lumbar stenosis and cauda equina syndrome: s/p

## 2023-12-20 NOTE — CARE COORDINATION
Writer is following for potential discharge to ARU. Writer spoke with Jenaro in ARU regarding pt admission. ARU is still following and pt is appropriate for admission when medically clear. Call can be placed to Jenaro when pt is medically ready.   Electronically signed by SURINDER Lopez on 12/20/2023 at 8:39 AM

## 2023-12-20 NOTE — DISCHARGE SUMMARY
[address] Internal Medicine  Gordon Schuster MD; Jerri Maier MD; Alejandra Cain MD; MD Brandy Campoverde Ra, MD; Jerrye Hamman, MD      [facility]  [address]    Discharge Summary     Patient ID: Antwan Chew  :  1960   MRN: [id number]     ACCOUNT:  [de-identified]   Patient's PCP: Wilbert Cui MD  Admit Date: 2023   Discharge Date: 2023     Length of Stay: 8  Code Status:  Full Code  Admitting Physician: Holly Ardon MD  Discharge Physician: Jerrye Hamman, MD     Active Discharge Diagnoses:     Hospital Problem Lists:  Principal Problem:    Elevated troponin  Active Problems:    Fatigue    Decreased appetite    NSTEMI (non-ST elevated myocardial infarction) ([facility])    Diastolic heart failure ([address])    Uncontrolled hypertension    Depression    Acute delirium  Resolved Problems:    * No resolved hospital problems. *      Admission Condition:  serious     Discharged Condition: stable    Hospital Stay:     Hospital Course:  Antwan Chew is a 59 y.o. female who was admitted for the management of   Elevated troponin , presented to ER with Fatigue  Damien Koehler is a 43-year-old ill-appearing, malnourished, pleasant lady who presents with difficulty swallowing, decreased energy, decreased appetite, and decreased motivation. She has a history of multiple spinal surgeries, cauda equina syndrome, breast cancer, GERD, hypertension, hypothyroidism, and osteoporosis. She works with an outpatient pain clinic for the management of severe back pain. She takes hydromorphone and hydrocodone. Her pain is not well-managed. She also has a history of alcohol abuse. The patient and her daughter state she drastically scaled back her alcohol use 4 years ago. She states she has a small drink twice a month. The patient reports a severely decreased appetite with difficulty swallowing.   She reports extreme fatigue and PROTONIX  Take 1 tablet by mouth daily              No discharge procedures on file.    Time Spent on discharge is  34 mins in patient examination, evaluation, counseling as well as medication reconciliation, prescriptions for required medications, discharge plan and follow up.    Electronically signed by   Urvashi Meredith MD  12/20/2023  6:07 PM      Thank you Giovanna Nagel MD for the opportunity to be involved in this patient's care.    Please note that this chart was generated using voice recognition Dragon dictation software.  Although every effort was made to ensure the accuracy of this automated transcription, some errors in transcription may have occurred.

## 2023-12-20 NOTE — PLAN OF CARE
Problem: Discharge Planning  Goal: Discharge to home or other facility with appropriate resources  Outcome: Progressing     Problem: Pain  Goal: Verbalizes/displays adequate comfort level or baseline comfort level  Outcome: Progressing     Problem: Skin/Tissue Integrity  Goal: Absence of new skin breakdown  Description: 1. Monitor for areas of redness and/or skin breakdown  2. Assess vascular access sites hourly  3. Every 4-6 hours minimum:  Change oxygen saturation probe site  4. Every 4-6 hours:  If on nasal continuous positive airway pressure, respiratory therapy assess nares and determine need for appliance change or resting period.   Outcome: Progressing     Problem: ABCDS Injury Assessment  Goal: Absence of physical injury  Outcome: Progressing     Problem: Safety - Adult  Goal: Free from fall injury  Outcome: Progressing     Problem: Skin/Tissue Integrity - Adult  Goal: Skin integrity remains intact  Outcome: Progressing     Problem: Musculoskeletal - Adult  Goal: Return mobility to safest level of function  Outcome: Progressing     Problem: Gastrointestinal - Adult  Goal: Maintains adequate nutritional intake  Outcome: Progressing     Problem: Metabolic/Fluid and Electrolytes - Adult  Goal: Electrolytes maintained within normal limits  Outcome: Progressing     Problem: Nutrition Deficit:  Goal: Optimize nutritional status  Outcome: Progressing

## 2023-12-20 NOTE — PROGRESS NOTES
Rehabilitation Physical Therapy    Date: 2023  Patient Name: Petar Ferreira        MRN: [id number]    : 1960  (59 y.o.)  Gender: female   Referring Practitioner: Ilene Max APRN- CNP  Diagnosis: decreased appetitie       Discharge Recommendations:  Patient would benefit from continued therapy after discharge, Therapy recommended at discharge, Continue to assess pending progress  Equipment Needed: No    Assessment  Assessment  Activity Tolerance: Treatment limited secondary to decreased cognition;Treatment limited secondary to medical complications (UTI causing confusion and hallucinations)  Discharge Recommendations: Patient would benefit from continued therapy after discharge; Therapy recommended at discharge;Continue to assess pending progress    Plan  Physical Therapy Plan  General Plan:  (5-7 treatments/ week)  Specific Instructions for Next Treatment: advance transfers w/ 2 assist using olga lidia petit, instruct in exercise program, advance to gait as tolerated, co treat w/ OT  Current Treatment Recommendations: Strengthening, Home exercise program, Balance training, Gait training, Functional mobility training, Safety education & training, Transfer training, Patient/Caregiver education & training, Equipment evaluation, education, & procurement, Endurance training, Therapeutic activities, Co-Treatment     Restrictions  Restrictions/Precautions: Fall Risk, Up as Tolerated  Implants present? : Metal implants  Other position/activity restrictions: NO BP, IV sticks or venipunctures right arm    Subjective  Subjective: Pt is pleasant but confused. Co-treat with Edmond Bailey. Pain: Denies pain at this time     Overall Cognitive Status: Exceptions  Arousal/Alertness: Inconsistent responses to stimuli  Following Commands:  Follows one step commands with repetition, Follows one step commands with increased time, Inconsistently follows commands  Attention Span: Attends with cues to redirect  Memory: Decreased recall of cues, Manual cues  Base of Support: Narrowed  Speed/Sharon: Slow  Step Length: Left shortened, Right shortened  Gait Abnormalities: Decreased step clearance, Path deviations, Step to gait    Balance  Sitting: Impaired  Sitting - Static: Fair (occasional)  Sitting - Dynamic: Fair (occasional)  Standing: Impaired  Standing - Static: Poor  Standing - Dynamic: Poor     PT Exercises  A/AROM Exercises: Seated BLE exs x 15reps  Functional Mobility Circuit Training: Standing with RW Pre-gait weight shifting and marching x4 reps  Static Sitting Balance Exercises: Dangle EOB 10 lmin.   Dynamic Sitting Balance Exercises: Seated EOB reaching out of base of support, unsteady however no LOB  Static Standing Balance Exercises: standing tolerance at the sink >5 min to perform ADL's  Dynamic Standing Balance Exercises: Standing dynamic with heavy posterior lean  Postural Correction Exercises: Seated/Standing Cues for upright posture (pt kyphotic however able to improve slightly with cues)     Education  Education Given To: Patient  Education Provided: Plan of Care  Education Provided Comments: continued hallucinations and confusion  Education Method: Verbal  Barriers to Learning: Cognition  Education Outcome: Continued education needed    Goals  Patient Goals   Patient Goals : did not stae a goal  Short Term Goals  Time Frame for Short Term Goals: 5-7 treatments/ week  Short Term Goal 1: pt to tolerate 1/2 hour of therapuetic exercise and activity  Short Term Goal 2: pt to demonstrate good technique for exercise program for general strengthening, balance activities and energy conservation  Short Term Goal 3: pt to demonstrate bed mobility w/ supervision for position change  Short Term Goal 4: pt to demonstrate transfers supine <>  sit w/ supervision  Short Term Goal 5: pt to demonstrate good sitting balance at the EOB w/ SBA and increase sitting tolerance to 15 minutes w/ supervision  Short Term Goal 6: pt to demonstrate transfers sit <> stand w/ min x 2 advancing to wheeled walker when medically ready  Short Term Goal 7: pt to demonstrate good standing balance w/ knee control on the Nathalie Stedy and increase standing tolerance to 3 minutes  Short Term Goal 8: pt to advance to and demonstrate gait w/ wheeled walker 10-20' w/ min x 2 and W/C follow after demonstrating knee control on the Nathalie Stedy     AM-formerly [facility] Basic Mobility - Inpatient  How much help is needed turning from your back to your side while in a flat bed without using bedrails?: A Little  How much help is needed moving from lying on your back to sitting on the side of a flat bed without using bedrails?: A Little  How much help is needed moving to and from a bed to a chair?: Total  How much help is needed standing up from a chair using your arms?: Total  How much help is needed walking in hospital room?: Total  How much help is needed climbing 3-5 steps with a railing?: Total  AM-PAC Inpatient Mobility Raw Score : 10  AM-PAC Inpatient T-Scale Score : 32.29  Mobility Inpatient CMS 0-100% Score: 76.75  Mobility Inpatient CMS G-Code Modifier : CL     PT Individual Minutes  Time In: 1426  Time Out: 1458  Minutes: 32    Electronically signed by Kayla Oliveros PTA on 12/20/23 at 4:08 PM EST

## 2023-12-20 NOTE — CARE COORDINATION
Transportation arranged for patient to go to [address] [place] [address] at 2000. Facility informed. Bedside nurse informed.      Number for Report: [phone]  Electronically signed by SURINDER Perry on 12/20/2023 at 4:17 PM

## 2023-12-20 NOTE — PROGRESS NOTES
[id number] [facility]   INPATIENT OCCUPATIONAL THERAPY  PROGRESS NOTE  Date: 2023  Patient Name: Kalin Huitron       Room: 4344/3795-68  MRN: [id number]    : 1960  (59 y.o.)  Gender: female   Referring Practitioner: Jacinta Kern MD  Diagnosis: Elevated troponin      Discharge Recommendations:  Further Occupational Therapy is recommended upon facility discharge. OT Equipment Recommendations  Other: TBD    Restrictions/Precautions  Restrictions/Precautions  Restrictions/Precautions: Fall Risk;Up as Tolerated  Required Braces or Orthoses?: No  Implants present? : Metal implants  Position Activity Restriction  Other position/activity restrictions: NO BP, IV sticks or venipunctures right arm    O2 Device: None (Room air)    Subjective  Subjective  Subjective: Pt speaking delusionally at this time. Requiring redirection frequently throughout session. Subjective  Subjective: Pt is pleasant but confused  Comments: RN ok'd therapy    Objective  Orientation  Overall Orientation Status: Impaired  Orientation Level: Oriented to person;Disoriented to situation;Disoriented to place; Disoriented to time  Cognition  Overall Cognitive Status: Exceptions  Arousal/Alertness: Inconsistent responses to stimuli  Following Commands: Follows one step commands with repetition; Follows one step commands with increased time; Inconsistently follows commands  Attention Span: Attends with cues to redirect  Memory: Decreased recall of recent events;Decreased short term memory;Decreased long term memory  Safety Judgement: Decreased awareness of need for assistance;Decreased awareness of need for safety  Problem Solving: Assistance required to generate solutions;Assistance required to implement solutions;Assistance required to identify errors made;Assistance required to correct errors made;Decreased awareness of errors  Insights: Not aware of deficits  Initiation: Requires cues for all  Sequencing: Requires cues for Outcome: Continued education needed, Unable to demonstrate understanding, Unable to verbalize    Goals  Short Term Goals  Time Frame for Short Term Goals: By discharge, pt will  Short Term Goal 1: perform functional transfers with CGA, least restrictive device, and Good safety  Short Term Goal 2: perform functional mobility with CGA, least restrictive device, and Good safety  Short Term Goal 3: tolerate standing for 5+ minutes with CGA during self care/functional activity, least restrictive device, and Good safety  Short Term Goal 4: perform lower body ADLs with Min A and adaptive equipment as needed  Short Term Goal 5: perform upper body ADLs with setup  Short Term Goal 6: actively participate in 15+ minutes of therapeutic exercise/functional activity to increase overall strength/endurance needed for ADLs/IADLs  Occupational Therapy Plan  Times Per Week: 4-6  Current Treatment Recommendations: Strengthening, Balance training, Functional mobility training, Endurance training, Pain management, Safety education & training, Patient/Caregiver education & training, Equipment evaluation, education, & procurement, Positioning, Self-Care / ADL, Home management training, Cognitive/Perceptual training, Coordination training    Assessment  Activity Tolerance  Activity Tolerance: Treatment limited secondary to decreased cognition  Activity Tolerance Comments: pt requests to lay in bed after sitting in chair for ~8 minutes due to fatigue/decreased activity tolerance  Assessment  Performance deficits / Impairments: Decreased functional mobility , Decreased ADL status, Decreased strength, Decreased safe awareness, Decreased cognition, Decreased endurance, Decreased sensation, Decreased balance, Decreased high-level IADLs, Decreased coordination  Treatment Diagnosis: impaired self care status  Prognosis: Good  Decision Making: High Complexity  Discharge Recommendations: Patient would benefit from continued therapy after

## 2023-12-20 NOTE — CARE COORDINATION
IMM letter provided to patient spouse Shakira Clark via telephone. Patient representative offered four hours to make informed decision regarding appeal process; patient representative agreeable to discharge.    Electronically signed by SURINDER Ross on 12/20/2023 at 4:21 PM 96

## 2023-12-20 NOTE — CARE COORDINATION
Writer received call from Jenaro in [address] [place] [address] regarding pt admission today. ARU can accept anytime before 1700 or after 2000. Writer placed call to bedside RN Robbie regarding this. Robbie will reach out to Dr. Rafa Mccarthy and place call back to writer. Electronically signed by SURINDER Catalan on 12/20/2023 at 2:54 PM    Writer placed call to charge RN Marcela regarding pt admission to ARU. Marcela states Dr. Rafa Mccarthy has to complete discharge readmit. Writer informed that pt can admit before 1700 or after 2000. Marcela cannot give a specific time for admission at this time. Writer placed call to Jenaro in ARU for update. No answer, voicemail left. Electronically signed by SURINDER Catalan on 12/20/2023 at 3:37 PM    Writer spoke with Jenaro in ARU, pt is okay to admit to ARU after 2000. Writer placed call to charge DENA Medrano regarding this. Agreeable to this transfer time.   Electronically signed by SURINDER Catalan on 12/20/2023 at 4:16 PM

## 2023-12-20 NOTE — CARE COORDINATION
Wears glasses for reading             []Hard of Hearing: Within Functional Limits        Communication Aids, Devices or Interpretation Services Required: None     Isolation Precautions:  None: Standard Precautions                  Physiatrist: Dr. Deniz Azul       Patients Occupation: Retired     Reviewed Lab and Diagnostic reports from Current Admission: Yes    Patients Prior Functional  Level: Prior Function  Receives Help From: Family  ADL Assistance: Needs assistance (needing assist from spouse last several days)  Toileting: Needs assistance (needing assist from spouse last several days)  Homemaking Assistance: Needs assistance (spouse is primary)  Ambulation Assistance: Needs assistance (using wheeled walker for last several days prior to admission w/ spouse assisting)  Transfer Assistance: Needs assistance (needing assist from spouse last several days)  Additional Comments: spouse is still working day shift- pt home alone. Pt goes to OP pain clinic    History of current illness, per PM&R Consult:  Toni Bush is a 59 y.o. RHD female admitted to [facility] on 12/12/2023. Patient with fatigue, poor appetite, trouble walking, and difficulty swallowing with associated weight loss. She has known history cauda equina syndrome and chronic back pain. Cardiology: consulted for elevated troponin, likely type II. Added coreg to Cardizem and lisinopril. Checked 2D Echo. Results below. Cardiology signed off after low risk echo with noted diastolic dysfunction. For outpatient follow up 2-4 weeks to consider outpatient stress test.      Psychiatry: evaluated for depression unspecified. Continued Buspirone and added Zoloft. Neurology: she had recent eval by Dr. Virgie Hunter in neurosurgery who noted her functional had improved and she was ambulating with walker after requiring wheelchair for progressive quadriparesis.  She has known history cervical myelopathy that was surgically decompressed and lumbar to patient upon admission to ARU with patient's verbalization of understanding. I have reviewed and concur with the findings and results of the pre-admission screening assessment completed by the Inpatient Rehabilitation Admissions Coordinator.

## 2023-12-21 LAB
ABSOLUTE BANDS #: 0.04 K/UL (ref 0–1)
ALBUMIN SERPL-MCNC: 3.4 G/DL (ref 3.5–5.2)
ALP SERPL-CCNC: 89 U/L (ref 35–104)
ALT SERPL-CCNC: 22 U/L (ref 5–33)
ANION GAP SERPL CALCULATED.3IONS-SCNC: 12 MMOL/L (ref 9–17)
AST SERPL-CCNC: 32 U/L
BANDS: 1 % (ref 0–10)
BASOPHILS # BLD: 0 K/UL (ref 0–0.2)
BASOPHILS NFR BLD: 0 % (ref 0–2)
BILIRUB DIRECT SERPL-MCNC: 0.2 MG/DL
BILIRUB INDIRECT SERPL-MCNC: 0.4 MG/DL (ref 0–1)
BILIRUB SERPL-MCNC: 0.6 MG/DL (ref 0.3–1.2)
BUN SERPL-MCNC: 7 MG/DL (ref 8–23)
CALCIUM SERPL-MCNC: 8.9 MG/DL (ref 8.6–10.4)
CHLORIDE SERPL-SCNC: 95 MMOL/L (ref 98–107)
CO2 SERPL-SCNC: 25 MMOL/L (ref 20–31)
CREAT SERPL-MCNC: 0.7 MG/DL (ref 0.5–0.9)
EOSINOPHIL # BLD: 0.04 K/UL (ref 0–0.4)
EOSINOPHILS RELATIVE PERCENT: 1 % (ref 0–4)
ERYTHROCYTE [DISTWIDTH] IN BLOOD BY AUTOMATED COUNT: 14.3 % (ref 11.5–14.9)
GFR SERPL CREATININE-BSD FRML MDRD: >60 ML/MIN/1.73M2
GLUCOSE SERPL-MCNC: 85 MG/DL (ref 70–99)
HCT VFR BLD AUTO: 28.1 % (ref 36–46)
HGB BLD-MCNC: 9.5 G/DL (ref 12–16)
LYMPHOCYTES NFR BLD: 0.68 K/UL (ref 1–4.8)
LYMPHOCYTES RELATIVE PERCENT: 19 % (ref 24–44)
MAGNESIUM SERPL-MCNC: 1.4 MG/DL (ref 1.6–2.6)
MCH RBC QN AUTO: 38.9 PG (ref 26–34)
MCHC RBC AUTO-ENTMCNC: 33.9 G/DL (ref 31–37)
MCV RBC AUTO: 114.7 FL (ref 80–100)
MONOCYTES NFR BLD: 1.08 K/UL (ref 0.1–1.3)
MONOCYTES NFR BLD: 30 % (ref 1–7)
MORPHOLOGY: ABNORMAL
MORPHOLOGY: ABNORMAL
NEUTROPHILS NFR BLD: 49 % (ref 36–66)
NEUTS SEG NFR BLD: 1.76 K/UL (ref 1.3–9.1)
PLATELET # BLD AUTO: 284 K/UL (ref 150–450)
PMV BLD AUTO: 7.8 FL (ref 6–12)
POTASSIUM SERPL-SCNC: 3.4 MMOL/L (ref 3.7–5.3)
PROT SERPL-MCNC: 6.6 G/DL (ref 6.4–8.3)
RBC # BLD AUTO: 2.45 M/UL (ref 4–5.2)
SODIUM SERPL-SCNC: 132 MMOL/L (ref 135–144)
WBC OTHER # BLD: 3.6 K/UL (ref 3.5–11)

## 2023-12-21 PROCEDURE — 80048 BASIC METABOLIC PNL TOTAL CA: CPT

## 2023-12-21 PROCEDURE — 97530 THERAPEUTIC ACTIVITIES: CPT

## 2023-12-21 PROCEDURE — 97162 PT EVAL MOD COMPLEX 30 MIN: CPT

## 2023-12-21 PROCEDURE — 85025 COMPLETE CBC W/AUTO DIFF WBC: CPT

## 2023-12-21 PROCEDURE — 99223 1ST HOSP IP/OBS HIGH 75: CPT | Performed by: PHYSICAL MEDICINE & REHABILITATION

## 2023-12-21 PROCEDURE — 6360000002 HC RX W HCPCS: Performed by: INTERNAL MEDICINE

## 2023-12-21 PROCEDURE — 6370000000 HC RX 637 (ALT 250 FOR IP): Performed by: INTERNAL MEDICINE

## 2023-12-21 PROCEDURE — 97116 GAIT TRAINING THERAPY: CPT

## 2023-12-21 PROCEDURE — 99223 1ST HOSP IP/OBS HIGH 75: CPT | Performed by: INTERNAL MEDICINE

## 2023-12-21 PROCEDURE — 97535 SELF CARE MNGMENT TRAINING: CPT

## 2023-12-21 PROCEDURE — 83735 ASSAY OF MAGNESIUM: CPT

## 2023-12-21 PROCEDURE — 92523 SPEECH SOUND LANG COMPREHEN: CPT

## 2023-12-21 PROCEDURE — 36415 COLL VENOUS BLD VENIPUNCTURE: CPT

## 2023-12-21 PROCEDURE — 80076 HEPATIC FUNCTION PANEL: CPT

## 2023-12-21 PROCEDURE — 1180000000 HC REHAB R&B

## 2023-12-21 PROCEDURE — 97166 OT EVAL MOD COMPLEX 45 MIN: CPT

## 2023-12-21 PROCEDURE — 6370000000 HC RX 637 (ALT 250 FOR IP): Performed by: PHYSICAL MEDICINE & REHABILITATION

## 2023-12-21 PROCEDURE — 92610 EVALUATE SWALLOWING FUNCTION: CPT

## 2023-12-21 PROCEDURE — 2580000003 HC RX 258: Performed by: INTERNAL MEDICINE

## 2023-12-21 RX ADMIN — BUSPIRONE HYDROCHLORIDE 30 MG: 15 TABLET ORAL at 09:05

## 2023-12-21 RX ADMIN — LISINOPRIL 10 MG: 10 TABLET ORAL at 09:05

## 2023-12-21 RX ADMIN — METHADONE HYDROCHLORIDE 10 MG: 10 TABLET ORAL at 09:05

## 2023-12-21 RX ADMIN — LEVOTHYROXINE SODIUM 100 MCG: 0.1 TABLET ORAL at 06:16

## 2023-12-21 RX ADMIN — CARVEDILOL 6.25 MG: 6.25 TABLET, FILM COATED ORAL at 17:23

## 2023-12-21 RX ADMIN — CARVEDILOL 6.25 MG: 6.25 TABLET, FILM COATED ORAL at 09:04

## 2023-12-21 RX ADMIN — Medication 3 MG: at 20:25

## 2023-12-21 RX ADMIN — BUSPIRONE HYDROCHLORIDE 30 MG: 15 TABLET ORAL at 20:25

## 2023-12-21 RX ADMIN — METHADONE HYDROCHLORIDE 10 MG: 10 TABLET ORAL at 20:25

## 2023-12-21 RX ADMIN — ENOXAPARIN SODIUM 40 MG: 100 INJECTION SUBCUTANEOUS at 09:04

## 2023-12-21 RX ADMIN — DILTIAZEM HYDROCHLORIDE 60 MG: 60 TABLET, FILM COATED ORAL at 06:16

## 2023-12-21 RX ADMIN — ASPIRIN 81 MG: 81 TABLET, COATED ORAL at 09:05

## 2023-12-21 RX ADMIN — DILTIAZEM HYDROCHLORIDE 60 MG: 60 TABLET, FILM COATED ORAL at 14:54

## 2023-12-21 RX ADMIN — DILTIAZEM HYDROCHLORIDE 60 MG: 60 TABLET, FILM COATED ORAL at 20:32

## 2023-12-21 RX ADMIN — CEFTRIAXONE SODIUM 1000 MG: 1 INJECTION, POWDER, FOR SOLUTION INTRAMUSCULAR; INTRAVENOUS at 02:02

## 2023-12-21 RX ADMIN — POLYETHYLENE GLYCOL 3350 17 G: 17 POWDER, FOR SOLUTION ORAL at 09:04

## 2023-12-21 RX ADMIN — PANTOPRAZOLE SODIUM 40 MG: 40 TABLET, DELAYED RELEASE ORAL at 09:05

## 2023-12-21 ASSESSMENT — PAIN SCALES - WONG BAKER

## 2023-12-21 ASSESSMENT — PAIN DESCRIPTION - LOCATION
LOCATION: BACK;LEG
LOCATION: BACK
LOCATION: BACK;LEG

## 2023-12-21 ASSESSMENT — PAIN SCALES - GENERAL
PAINLEVEL_OUTOF10: 7

## 2023-12-21 NOTE — PROGRESS NOTES
Report called to ARU, receiving nurse is Danae Heller. Pt set to go to Room #26. All belongings being sent with pt.

## 2023-12-21 NOTE — PLAN OF CARE
Problem: Discharge Planning  Goal: Discharge to home or other facility with appropriate resources  12/20/2023 2045 by Jose Miramontes RN  Outcome: Completed  12/20/2023 1649 by Olga Ruiz RN  Outcome: Progressing     Problem: Pain  Goal: Verbalizes/displays adequate comfort level or baseline comfort level  12/20/2023 2045 by Jose Miramontes RN  Outcome: Completed  12/20/2023 1649 by Olga Ruiz RN  Outcome: Progressing     Problem: Skin/Tissue Integrity  Goal: Absence of new skin breakdown  Description: 1. Monitor for areas of redness and/or skin breakdown  2. Assess vascular access sites hourly  3. Every 4-6 hours minimum:  Change oxygen saturation probe site  4. Every 4-6 hours:  If on nasal continuous positive airway pressure, respiratory therapy assess nares and determine need for appliance change or resting period.   12/20/2023 2045 by Jose Miramontes RN  Outcome: Completed  12/20/2023 1649 by Olga Ruiz RN  Outcome: Progressing     Problem: ABCDS Injury Assessment  Goal: Absence of physical injury  12/20/2023 2045 by Jose Miramontes RN  Outcome: Completed  12/20/2023 1649 by Olga Ruiz RN  Outcome: Progressing     Problem: Safety - Adult  Goal: Free from fall injury  12/20/2023 2045 by Jose Miramontes RN  Outcome: Completed  12/20/2023 1649 by Olga Ruiz RN  Outcome: Progressing     Problem: Skin/Tissue Integrity - Adult  Goal: Skin integrity remains intact  12/20/2023 2045 by Jose Miramontes RN  Outcome: Completed  12/20/2023 1649 by Olga Ruiz RN  Outcome: Progressing     Problem: Musculoskeletal - Adult  Goal: Return mobility to safest level of function  12/20/2023 2045 by Jose Miramontes RN  Outcome: Completed  12/20/2023 1649 by Olga Ruiz RN  Outcome: Progressing     Problem: Gastrointestinal - Adult  Goal: Maintains adequate nutritional intake  12/20/2023 2045 by Jose Miramontes RN  Outcome: Completed  12/20/2023 1649 by Olga Ruiz RN  Outcome: Progressing Problem: Metabolic/Fluid and Electrolytes - Adult  Goal: Electrolytes maintained within normal limits  12/20/2023 2045 by Laz De La Cruz RN  Outcome: Completed  12/20/2023 1649 by Jey Saeed RN  Outcome: Progressing     Problem: Nutrition Deficit:  Goal: Optimize nutritional status  12/20/2023 2045 by Laz De La Cruz RN  Outcome: Completed  12/20/2023 1649 by Jey Saeed RN  Outcome: Progressing

## 2023-12-21 NOTE — H&P
decline in mobility:  PT/OT for gait, mobility, strengthening, endurance, ADLs, and self care.   Hx cervical myelopathy: s/p surgical decompression  Failure to thrive  UTI: on IV Rocephin until 12/24/23  Dysphagia: on easy to chew diet, thin liquids.  Impaired cognition: SLP to eval and treat  Chronic pain: follows outpatient pain clinic. On Methadone, baclofen.   Depression: on buspirone, sertraline  Hypothyroidism: on levothyroxine  Hypokalemia: Mildly reduced at 3.4. IM following   Hypomagnesemia:  Reduced at 1.4. IM following  HTN: on cardizem, carvedilol, lisinopril,  Insomnia: on melatonin  GERD: on pantoprazole  Spasticity: follows with me as outpatient - planning for Botox. On baclofen.   Hx lumbar stenosis/cauda equina syndrome: s/p lumbar laminectomy.   Bowel Management: Miralax daily, senokot prn, dulcolax prn.  DVT Prophylaxis:  low molecular weight heparin, SCD's while in bed, and HERMINIO's during the day  Internal medicine for medical management      Estimated Length of Stay:  2 weeks. Anticipate patient can achieve functional goals and return home in this period of time.     Prognosis  Fair - patient having impaired cognition and mobility which may affect discharge plan.  works. May not have 24-hour support at home. IM adjusting electrolyte imbalances and completing UTI treatment.     Goals    Home at Supervision  Supervision at Discharge: Kenneth JOHNSON MD     This note is created with the assistance of a speech recognition program.  While intending to generate a document that actually reflects the content of the visit, the document can still have some errors including those of syntax and sound a like substitutions which may escape proof reading.  In such instances, actual meaning can be extrapolated by contextual diversion.

## 2023-12-22 PROCEDURE — 2580000003 HC RX 258: Performed by: INTERNAL MEDICINE

## 2023-12-22 PROCEDURE — 97110 THERAPEUTIC EXERCISES: CPT

## 2023-12-22 PROCEDURE — 97129 THER IVNTJ 1ST 15 MIN: CPT

## 2023-12-22 PROCEDURE — 97530 THERAPEUTIC ACTIVITIES: CPT

## 2023-12-22 PROCEDURE — 97535 SELF CARE MNGMENT TRAINING: CPT

## 2023-12-22 PROCEDURE — 1180000000 HC REHAB R&B

## 2023-12-22 PROCEDURE — 97130 THER IVNTJ EA ADDL 15 MIN: CPT

## 2023-12-22 PROCEDURE — 99221 1ST HOSP IP/OBS SF/LOW 40: CPT | Performed by: INTERNAL MEDICINE

## 2023-12-22 PROCEDURE — 97116 GAIT TRAINING THERAPY: CPT

## 2023-12-22 PROCEDURE — 99232 SBSQ HOSP IP/OBS MODERATE 35: CPT | Performed by: PHYSICAL MEDICINE & REHABILITATION

## 2023-12-22 PROCEDURE — 6360000002 HC RX W HCPCS: Performed by: INTERNAL MEDICINE

## 2023-12-22 PROCEDURE — 6370000000 HC RX 637 (ALT 250 FOR IP): Performed by: INTERNAL MEDICINE

## 2023-12-22 PROCEDURE — 6370000000 HC RX 637 (ALT 250 FOR IP): Performed by: PHYSICAL MEDICINE & REHABILITATION

## 2023-12-22 RX ORDER — QUETIAPINE FUMARATE 25 MG/1
12.5 TABLET, FILM COATED ORAL NIGHTLY
Status: DISCONTINUED | OUTPATIENT
Start: 2023-12-22 | End: 2023-12-24

## 2023-12-22 RX ADMIN — ENOXAPARIN SODIUM 40 MG: 100 INJECTION SUBCUTANEOUS at 08:11

## 2023-12-22 RX ADMIN — PANTOPRAZOLE SODIUM 40 MG: 40 TABLET, DELAYED RELEASE ORAL at 08:10

## 2023-12-22 RX ADMIN — CARVEDILOL 6.25 MG: 6.25 TABLET, FILM COATED ORAL at 16:57

## 2023-12-22 RX ADMIN — POLYETHYLENE GLYCOL 3350 17 G: 17 POWDER, FOR SOLUTION ORAL at 08:10

## 2023-12-22 RX ADMIN — DILTIAZEM HYDROCHLORIDE 60 MG: 60 TABLET, FILM COATED ORAL at 06:13

## 2023-12-22 RX ADMIN — BUSPIRONE HYDROCHLORIDE 30 MG: 15 TABLET ORAL at 21:13

## 2023-12-22 RX ADMIN — QUETIAPINE FUMARATE 12.5 MG: 25 TABLET ORAL at 21:13

## 2023-12-22 RX ADMIN — DILTIAZEM HYDROCHLORIDE 60 MG: 60 TABLET, FILM COATED ORAL at 15:15

## 2023-12-22 RX ADMIN — BUSPIRONE HYDROCHLORIDE 30 MG: 15 TABLET ORAL at 08:11

## 2023-12-22 RX ADMIN — LEVOTHYROXINE SODIUM 100 MCG: 0.1 TABLET ORAL at 06:13

## 2023-12-22 RX ADMIN — CARVEDILOL 6.25 MG: 6.25 TABLET, FILM COATED ORAL at 08:11

## 2023-12-22 RX ADMIN — METHADONE HYDROCHLORIDE 10 MG: 10 TABLET ORAL at 21:13

## 2023-12-22 RX ADMIN — METHADONE HYDROCHLORIDE 10 MG: 10 TABLET ORAL at 08:10

## 2023-12-22 RX ADMIN — ASPIRIN 81 MG: 81 TABLET, COATED ORAL at 08:11

## 2023-12-22 RX ADMIN — LISINOPRIL 10 MG: 10 TABLET ORAL at 08:10

## 2023-12-22 RX ADMIN — DILTIAZEM HYDROCHLORIDE 60 MG: 60 TABLET, FILM COATED ORAL at 21:13

## 2023-12-22 RX ADMIN — CEFTRIAXONE SODIUM 1000 MG: 1 INJECTION, POWDER, FOR SOLUTION INTRAMUSCULAR; INTRAVENOUS at 01:44

## 2023-12-22 ASSESSMENT — PAIN SCALES - WONG BAKER

## 2023-12-22 ASSESSMENT — PAIN DESCRIPTION - LOCATION
LOCATION: NECK
LOCATION: HEAD

## 2023-12-22 ASSESSMENT — PAIN DESCRIPTION - DESCRIPTORS: DESCRIPTORS: ACHING

## 2023-12-22 ASSESSMENT — PAIN DESCRIPTION - ORIENTATION
ORIENTATION: MID
ORIENTATION: MID

## 2023-12-22 ASSESSMENT — PAIN SCALES - GENERAL
PAINLEVEL_OUTOF10: 7
PAINLEVEL_OUTOF10: 8

## 2023-12-22 NOTE — INTERDISCIPLINARY ROUNDS
[facility] Acute Inpatient Rehabilitation  INTERDISCIPLINARY MEETING  Date: 23  Patient Name: Rajani Iglesias       Room: 2626/2626-01  MRN: [id number]       : 1960  (63 y.o.)     Gender: female     Patient's care team, including nursing, speech language pathologist, occupational therapist, and/or physical therapist, met to discuss patient's care needs. Any patient concerns, change in medical status, and current transfer/mobility status were identified at this time.     Any Additional Pertinent Information:   Not Applicable    Participating Team Members:  Nurse: Zeinab Mayo LPN    Occupational Therapist: [employer]    Physical Therapist: Cindy Carson PT  Speech Therapist:  Diana Sosa M.A., CCC-SLP

## 2023-12-23 PROCEDURE — 6370000000 HC RX 637 (ALT 250 FOR IP): Performed by: PHYSICAL MEDICINE & REHABILITATION

## 2023-12-23 PROCEDURE — 97530 THERAPEUTIC ACTIVITIES: CPT

## 2023-12-23 PROCEDURE — 6370000000 HC RX 637 (ALT 250 FOR IP): Performed by: INTERNAL MEDICINE

## 2023-12-23 PROCEDURE — 99232 SBSQ HOSP IP/OBS MODERATE 35: CPT | Performed by: INTERNAL MEDICINE

## 2023-12-23 PROCEDURE — 6360000002 HC RX W HCPCS: Performed by: INTERNAL MEDICINE

## 2023-12-23 PROCEDURE — 97116 GAIT TRAINING THERAPY: CPT

## 2023-12-23 PROCEDURE — 97110 THERAPEUTIC EXERCISES: CPT

## 2023-12-23 PROCEDURE — 97129 THER IVNTJ 1ST 15 MIN: CPT

## 2023-12-23 PROCEDURE — 2580000003 HC RX 258: Performed by: INTERNAL MEDICINE

## 2023-12-23 PROCEDURE — 97130 THER IVNTJ EA ADDL 15 MIN: CPT

## 2023-12-23 PROCEDURE — 1180000000 HC REHAB R&B

## 2023-12-23 PROCEDURE — 93005 ELECTROCARDIOGRAM TRACING: CPT | Performed by: PHYSICAL MEDICINE & REHABILITATION

## 2023-12-23 RX ORDER — CARVEDILOL 3.12 MG/1
3.12 TABLET ORAL 2 TIMES DAILY WITH MEALS
Status: DISCONTINUED | OUTPATIENT
Start: 2023-12-23 | End: 2023-12-31

## 2023-12-23 RX ORDER — DOCUSATE SODIUM 100 MG/1
100 CAPSULE, LIQUID FILLED ORAL 2 TIMES DAILY
Status: DISCONTINUED | OUTPATIENT
Start: 2023-12-23 | End: 2024-01-02 | Stop reason: HOSPADM

## 2023-12-23 RX ORDER — LISINOPRIL 5 MG/1
2.5 TABLET ORAL DAILY
Status: DISCONTINUED | OUTPATIENT
Start: 2023-12-24 | End: 2023-12-29

## 2023-12-23 RX ADMIN — DOCUSATE SODIUM 100 MG: 100 CAPSULE, LIQUID FILLED ORAL at 12:50

## 2023-12-23 RX ADMIN — METHADONE HYDROCHLORIDE 10 MG: 10 TABLET ORAL at 08:27

## 2023-12-23 RX ADMIN — PANTOPRAZOLE SODIUM 40 MG: 40 TABLET, DELAYED RELEASE ORAL at 08:27

## 2023-12-23 RX ADMIN — BUSPIRONE HYDROCHLORIDE 30 MG: 15 TABLET ORAL at 20:11

## 2023-12-23 RX ADMIN — BUSPIRONE HYDROCHLORIDE 30 MG: 15 TABLET ORAL at 08:27

## 2023-12-23 RX ADMIN — ENOXAPARIN SODIUM 40 MG: 100 INJECTION SUBCUTANEOUS at 08:27

## 2023-12-23 RX ADMIN — DILTIAZEM HYDROCHLORIDE 60 MG: 60 TABLET, FILM COATED ORAL at 05:56

## 2023-12-23 RX ADMIN — CARVEDILOL 6.25 MG: 6.25 TABLET, FILM COATED ORAL at 08:50

## 2023-12-23 RX ADMIN — QUETIAPINE FUMARATE 12.5 MG: 25 TABLET ORAL at 20:11

## 2023-12-23 RX ADMIN — DOCUSATE SODIUM 100 MG: 100 CAPSULE, LIQUID FILLED ORAL at 21:19

## 2023-12-23 RX ADMIN — CARVEDILOL 3.12 MG: 3.12 TABLET, FILM COATED ORAL at 15:59

## 2023-12-23 RX ADMIN — DILTIAZEM HYDROCHLORIDE 60 MG: 60 TABLET, FILM COATED ORAL at 21:19

## 2023-12-23 RX ADMIN — METHADONE HYDROCHLORIDE 10 MG: 10 TABLET ORAL at 20:11

## 2023-12-23 RX ADMIN — ASPIRIN 81 MG: 81 TABLET, COATED ORAL at 08:27

## 2023-12-23 RX ADMIN — DILTIAZEM HYDROCHLORIDE 60 MG: 60 TABLET, FILM COATED ORAL at 15:59

## 2023-12-23 RX ADMIN — CEFTRIAXONE SODIUM 1000 MG: 1 INJECTION, POWDER, FOR SOLUTION INTRAMUSCULAR; INTRAVENOUS at 02:05

## 2023-12-23 RX ADMIN — LEVOTHYROXINE SODIUM 100 MCG: 0.1 TABLET ORAL at 05:56

## 2023-12-23 ASSESSMENT — PAIN SCALES - WONG BAKER

## 2023-12-24 ENCOUNTER — APPOINTMENT (OUTPATIENT)
Dept: CT IMAGING | Age: 63
DRG: 948 | End: 2023-12-24
Attending: PHYSICAL MEDICINE & REHABILITATION
Payer: MEDICARE

## 2023-12-24 PROBLEM — R90.89 ABNORMAL CT OF BRAIN: Status: ACTIVE | Noted: 2023-12-24

## 2023-12-24 PROBLEM — G93.40 ENCEPHALOPATHY: Status: ACTIVE | Noted: 2023-12-24

## 2023-12-24 LAB
ANION GAP SERPL CALCULATED.3IONS-SCNC: 9 MMOL/L (ref 9–17)
BACTERIA URNS QL MICRO: ABNORMAL
BILIRUB UR QL STRIP: NEGATIVE
BUN SERPL-MCNC: 16 MG/DL (ref 8–23)
CALCIUM SERPL-MCNC: 8.8 MG/DL (ref 8.6–10.4)
CASTS #/AREA URNS LPF: ABNORMAL /LPF
CHLORIDE SERPL-SCNC: 99 MMOL/L (ref 98–107)
CLARITY UR: CLEAR
CO2 SERPL-SCNC: 26 MMOL/L (ref 20–31)
COLOR UR: ABNORMAL
CREAT SERPL-MCNC: 0.8 MG/DL (ref 0.5–0.9)
EPI CELLS #/AREA URNS HPF: ABNORMAL /HPF
ERYTHROCYTE [DISTWIDTH] IN BLOOD BY AUTOMATED COUNT: 14.3 % (ref 11.5–14.9)
GFR SERPL CREATININE-BSD FRML MDRD: >60 ML/MIN/1.73M2
GLUCOSE SERPL-MCNC: 77 MG/DL (ref 70–99)
GLUCOSE UR STRIP-MCNC: NEGATIVE MG/DL
HCT VFR BLD AUTO: 24.3 % (ref 36–46)
HGB BLD-MCNC: 8.2 G/DL (ref 12–16)
HGB UR QL STRIP.AUTO: NEGATIVE
KETONES UR STRIP-MCNC: ABNORMAL MG/DL
LEUKOCYTE ESTERASE UR QL STRIP: NEGATIVE
MAGNESIUM SERPL-MCNC: 1.8 MG/DL (ref 1.6–2.6)
MCH RBC QN AUTO: 38.7 PG (ref 26–34)
MCHC RBC AUTO-ENTMCNC: 33.9 G/DL (ref 31–37)
MCV RBC AUTO: 114 FL (ref 80–100)
NITRITE UR QL STRIP: NEGATIVE
PH UR STRIP: 5.5 [PH] (ref 5–8)
PLATELET # BLD AUTO: 255 K/UL (ref 150–450)
PMV BLD AUTO: 8.3 FL (ref 6–12)
POTASSIUM SERPL-SCNC: 3.6 MMOL/L (ref 3.7–5.3)
PROT UR STRIP-MCNC: NEGATIVE MG/DL
RBC # BLD AUTO: 2.13 M/UL (ref 4–5.2)
RBC #/AREA URNS HPF: ABNORMAL /HPF
SODIUM SERPL-SCNC: 134 MMOL/L (ref 135–144)
SP GR UR STRIP: 1.02 (ref 1–1.03)
UROBILINOGEN UR STRIP-ACNC: NORMAL EU/DL (ref 0–1)
WBC #/AREA URNS HPF: ABNORMAL /HPF
WBC OTHER # BLD: 6 K/UL (ref 3.5–11)

## 2023-12-24 PROCEDURE — 85027 COMPLETE CBC AUTOMATED: CPT

## 2023-12-24 PROCEDURE — 99222 1ST HOSP IP/OBS MODERATE 55: CPT | Performed by: PSYCHIATRY & NEUROLOGY

## 2023-12-24 PROCEDURE — 6360000002 HC RX W HCPCS: Performed by: INTERNAL MEDICINE

## 2023-12-24 PROCEDURE — 99232 SBSQ HOSP IP/OBS MODERATE 35: CPT | Performed by: INTERNAL MEDICINE

## 2023-12-24 PROCEDURE — 6370000000 HC RX 637 (ALT 250 FOR IP): Performed by: PHYSICAL MEDICINE & REHABILITATION

## 2023-12-24 PROCEDURE — 97110 THERAPEUTIC EXERCISES: CPT

## 2023-12-24 PROCEDURE — 81001 URINALYSIS AUTO W/SCOPE: CPT

## 2023-12-24 PROCEDURE — 97530 THERAPEUTIC ACTIVITIES: CPT

## 2023-12-24 PROCEDURE — 1180000000 HC REHAB R&B

## 2023-12-24 PROCEDURE — 6370000000 HC RX 637 (ALT 250 FOR IP): Performed by: INTERNAL MEDICINE

## 2023-12-24 PROCEDURE — 36415 COLL VENOUS BLD VENIPUNCTURE: CPT

## 2023-12-24 PROCEDURE — 97535 SELF CARE MNGMENT TRAINING: CPT

## 2023-12-24 PROCEDURE — 80048 BASIC METABOLIC PNL TOTAL CA: CPT

## 2023-12-24 PROCEDURE — 70450 CT HEAD/BRAIN W/O DYE: CPT

## 2023-12-24 PROCEDURE — 83735 ASSAY OF MAGNESIUM: CPT

## 2023-12-24 PROCEDURE — 97116 GAIT TRAINING THERAPY: CPT

## 2023-12-24 RX ORDER — QUETIAPINE FUMARATE 25 MG/1
25 TABLET, FILM COATED ORAL NIGHTLY
Status: DISCONTINUED | OUTPATIENT
Start: 2023-12-24 | End: 2023-12-24

## 2023-12-24 RX ORDER — QUETIAPINE FUMARATE 25 MG/1
6.25 TABLET, FILM COATED ORAL NIGHTLY PRN
Status: DISCONTINUED | OUTPATIENT
Start: 2023-12-24 | End: 2024-01-02 | Stop reason: HOSPADM

## 2023-12-24 RX ORDER — QUETIAPINE FUMARATE 25 MG/1
12.5 TABLET, FILM COATED ORAL NIGHTLY
Status: DISCONTINUED | OUTPATIENT
Start: 2023-12-24 | End: 2024-01-02 | Stop reason: HOSPADM

## 2023-12-24 RX ORDER — QUETIAPINE FUMARATE 25 MG/1
12.5 TABLET, FILM COATED ORAL NIGHTLY PRN
Status: DISCONTINUED | OUTPATIENT
Start: 2023-12-24 | End: 2023-12-24

## 2023-12-24 RX ORDER — POTASSIUM CHLORIDE 20 MEQ/1
20 TABLET, EXTENDED RELEASE ORAL ONCE
Status: COMPLETED | OUTPATIENT
Start: 2023-12-24 | End: 2023-12-24

## 2023-12-24 RX ADMIN — PANTOPRAZOLE SODIUM 40 MG: 40 TABLET, DELAYED RELEASE ORAL at 09:15

## 2023-12-24 RX ADMIN — DOCUSATE SODIUM 100 MG: 100 CAPSULE, LIQUID FILLED ORAL at 09:12

## 2023-12-24 RX ADMIN — CARVEDILOL 3.12 MG: 3.12 TABLET, FILM COATED ORAL at 09:12

## 2023-12-24 RX ADMIN — DOCUSATE SODIUM 100 MG: 100 CAPSULE, LIQUID FILLED ORAL at 21:14

## 2023-12-24 RX ADMIN — METHADONE HYDROCHLORIDE 10 MG: 10 TABLET ORAL at 21:14

## 2023-12-24 RX ADMIN — ASPIRIN 81 MG: 81 TABLET, COATED ORAL at 09:04

## 2023-12-24 RX ADMIN — LISINOPRIL 2.5 MG: 5 TABLET ORAL at 09:12

## 2023-12-24 RX ADMIN — DILTIAZEM HYDROCHLORIDE 60 MG: 60 TABLET, FILM COATED ORAL at 21:15

## 2023-12-24 RX ADMIN — POTASSIUM CHLORIDE 20 MEQ: 1500 TABLET, EXTENDED RELEASE ORAL at 12:12

## 2023-12-24 RX ADMIN — ENOXAPARIN SODIUM 40 MG: 100 INJECTION SUBCUTANEOUS at 09:03

## 2023-12-24 RX ADMIN — METHADONE HYDROCHLORIDE 10 MG: 10 TABLET ORAL at 09:04

## 2023-12-24 RX ADMIN — DILTIAZEM HYDROCHLORIDE 60 MG: 60 TABLET, FILM COATED ORAL at 05:14

## 2023-12-24 RX ADMIN — BUSPIRONE HYDROCHLORIDE 30 MG: 15 TABLET ORAL at 09:04

## 2023-12-24 RX ADMIN — DILTIAZEM HYDROCHLORIDE 60 MG: 60 TABLET, FILM COATED ORAL at 14:41

## 2023-12-24 RX ADMIN — QUETIAPINE FUMARATE 12.5 MG: 25 TABLET ORAL at 21:17

## 2023-12-24 RX ADMIN — LEVOTHYROXINE SODIUM 100 MCG: 0.1 TABLET ORAL at 05:14

## 2023-12-24 RX ADMIN — BUSPIRONE HYDROCHLORIDE 30 MG: 15 TABLET ORAL at 21:14

## 2023-12-24 RX ADMIN — CARVEDILOL 3.12 MG: 3.12 TABLET, FILM COATED ORAL at 17:22

## 2023-12-24 ASSESSMENT — PAIN SCALES - WONG BAKER

## 2023-12-24 ASSESSMENT — PAIN SCALES - GENERAL
PAINLEVEL_OUTOF10: 8
PAINLEVEL_OUTOF10: 7

## 2023-12-24 ASSESSMENT — PAIN DESCRIPTION - DESCRIPTORS: DESCRIPTORS: ACHING

## 2023-12-24 ASSESSMENT — PAIN DESCRIPTION - ORIENTATION: ORIENTATION: RIGHT;LEFT;MID

## 2023-12-24 ASSESSMENT — PAIN DESCRIPTION - LOCATION: LOCATION: HEAD;NECK

## 2023-12-25 LAB
CHOLEST SERPL-MCNC: 128 MG/DL
CHOLESTEROL/HDL RATIO: 2.8
HCT VFR BLD AUTO: 23.5 % (ref 36–46)
HDLC SERPL-MCNC: 46 MG/DL
HGB BLD-MCNC: 8.2 G/DL (ref 12–16)
LDLC SERPL CALC-MCNC: 64 MG/DL (ref 0–130)
T PALLIDUM AB SER QL IA: NONREACTIVE
TRIGL SERPL-MCNC: 89 MG/DL

## 2023-12-25 PROCEDURE — 36415 COLL VENOUS BLD VENIPUNCTURE: CPT

## 2023-12-25 PROCEDURE — 97530 THERAPEUTIC ACTIVITIES: CPT

## 2023-12-25 PROCEDURE — 6360000002 HC RX W HCPCS: Performed by: INTERNAL MEDICINE

## 2023-12-25 PROCEDURE — 97129 THER IVNTJ 1ST 15 MIN: CPT

## 2023-12-25 PROCEDURE — 85014 HEMATOCRIT: CPT

## 2023-12-25 PROCEDURE — 97535 SELF CARE MNGMENT TRAINING: CPT

## 2023-12-25 PROCEDURE — 80061 LIPID PANEL: CPT

## 2023-12-25 PROCEDURE — 97116 GAIT TRAINING THERAPY: CPT

## 2023-12-25 PROCEDURE — 97130 THER IVNTJ EA ADDL 15 MIN: CPT

## 2023-12-25 PROCEDURE — 6370000000 HC RX 637 (ALT 250 FOR IP): Performed by: INTERNAL MEDICINE

## 2023-12-25 PROCEDURE — 6370000000 HC RX 637 (ALT 250 FOR IP): Performed by: PSYCHIATRY & NEUROLOGY

## 2023-12-25 PROCEDURE — 85018 HEMOGLOBIN: CPT

## 2023-12-25 PROCEDURE — 99231 SBSQ HOSP IP/OBS SF/LOW 25: CPT | Performed by: INTERNAL MEDICINE

## 2023-12-25 PROCEDURE — 86780 TREPONEMA PALLIDUM: CPT

## 2023-12-25 PROCEDURE — 1180000000 HC REHAB R&B

## 2023-12-25 PROCEDURE — 6370000000 HC RX 637 (ALT 250 FOR IP): Performed by: PHYSICAL MEDICINE & REHABILITATION

## 2023-12-25 PROCEDURE — 99232 SBSQ HOSP IP/OBS MODERATE 35: CPT | Performed by: PSYCHIATRY & NEUROLOGY

## 2023-12-25 RX ORDER — FOLIC ACID 1 MG/1
1 TABLET ORAL DAILY
Status: DISCONTINUED | OUTPATIENT
Start: 2023-12-25 | End: 2024-01-02 | Stop reason: HOSPADM

## 2023-12-25 RX ORDER — ATORVASTATIN CALCIUM 40 MG/1
40 TABLET, FILM COATED ORAL NIGHTLY
Status: DISCONTINUED | OUTPATIENT
Start: 2023-12-25 | End: 2024-01-02 | Stop reason: HOSPADM

## 2023-12-25 RX ADMIN — ATORVASTATIN CALCIUM 40 MG: 40 TABLET, FILM COATED ORAL at 20:24

## 2023-12-25 RX ADMIN — DILTIAZEM HYDROCHLORIDE 60 MG: 60 TABLET, FILM COATED ORAL at 20:25

## 2023-12-25 RX ADMIN — DOCUSATE SODIUM 100 MG: 100 CAPSULE, LIQUID FILLED ORAL at 09:16

## 2023-12-25 RX ADMIN — QUETIAPINE FUMARATE 12.5 MG: 25 TABLET ORAL at 18:43

## 2023-12-25 RX ADMIN — METHADONE HYDROCHLORIDE 10 MG: 10 TABLET ORAL at 09:16

## 2023-12-25 RX ADMIN — LEVOTHYROXINE SODIUM 100 MCG: 0.1 TABLET ORAL at 06:01

## 2023-12-25 RX ADMIN — CARVEDILOL 3.12 MG: 3.12 TABLET, FILM COATED ORAL at 17:25

## 2023-12-25 RX ADMIN — FOLIC ACID 1 MG: 1 TABLET ORAL at 09:26

## 2023-12-25 RX ADMIN — BUSPIRONE HYDROCHLORIDE 30 MG: 15 TABLET ORAL at 09:16

## 2023-12-25 RX ADMIN — CARVEDILOL 3.12 MG: 3.12 TABLET, FILM COATED ORAL at 09:16

## 2023-12-25 RX ADMIN — ASPIRIN 81 MG: 81 TABLET, COATED ORAL at 09:16

## 2023-12-25 RX ADMIN — DILTIAZEM HYDROCHLORIDE 60 MG: 60 TABLET, FILM COATED ORAL at 13:12

## 2023-12-25 RX ADMIN — DILTIAZEM HYDROCHLORIDE 60 MG: 60 TABLET, FILM COATED ORAL at 06:01

## 2023-12-25 RX ADMIN — BUSPIRONE HYDROCHLORIDE 30 MG: 15 TABLET ORAL at 20:21

## 2023-12-25 RX ADMIN — METHADONE HYDROCHLORIDE 10 MG: 10 TABLET ORAL at 20:21

## 2023-12-25 RX ADMIN — PANTOPRAZOLE SODIUM 40 MG: 40 TABLET, DELAYED RELEASE ORAL at 09:16

## 2023-12-25 RX ADMIN — ENOXAPARIN SODIUM 40 MG: 100 INJECTION SUBCUTANEOUS at 09:16

## 2023-12-25 RX ADMIN — DOCUSATE SODIUM 100 MG: 100 CAPSULE, LIQUID FILLED ORAL at 20:20

## 2023-12-25 RX ADMIN — LISINOPRIL 2.5 MG: 5 TABLET ORAL at 09:16

## 2023-12-25 ASSESSMENT — PAIN SCALES - WONG BAKER

## 2023-12-25 ASSESSMENT — PAIN DESCRIPTION - LOCATION: LOCATION: BACK

## 2023-12-25 ASSESSMENT — PAIN DESCRIPTION - ORIENTATION: ORIENTATION: LOWER

## 2023-12-25 ASSESSMENT — PAIN SCALES - GENERAL: PAINLEVEL_OUTOF10: 7

## 2023-12-25 NOTE — PATIENT CARE CONFERENCE
[facility] Acute Inpatient Rehabilitation  TEAM CONFERENCE NOTE  Date: 23  Patient Name: Rajani Iglesias       Room: 2626/2626-01  MRN: [id number]       : 1960  (63 y.o.)     Gender: female     Referring Practitioner: Dr. Leeann Lynn     PRINCIPAL DIAGNOSIS: Quadriplegia, functional (HCC)    NURSING    Bladder Continence  Always Continent  Bowel Continence Always Continent    Date of Last BM: 2023    Bladder/Bowel Program Interventions: Both Bowel & Bladder Program In Place     Wounds/Incisions/Ulcers: No skin issues identified    Pain Control:  pt sometimes has some confusion but overall can control how to deal with pain.     Pain Medication Regimen Usage Pattern: MAR reviewed and pain medications are being used at the following frequency (Specify Medication, # Doses Administered on average per day, identified patterns of use - for example: time of day, prior to activity/therapy)  Tylenol  mg Q 4hrs     Fall Risk:  Falling star program initiated    Medication Education Program: Patient/Responsible Party unable to manage medication needs currently due some confusion     Discharge Preparation Patient/Responsible Party Education In Progress:   No Educational Needs Identified    Nursing specific communication for TEAM: No additional information identified requiring communication at this time    PHYSICAL THERAPY    Bed Mobility:        Bridging: Stand by assistance  Rolling to Left: Stand by assistance  Rolling to Right: Stand by assistance  Supine to Sit: Stand by assistance  Sit to Supine: Stand by assistance  Scooting: Stand by assistance (has a posterior lean with scooting, requires increased time for mobility and cues for hand placement)  Bed Mobility Comments: pt uses bed rails and is with posterior lean/LOB and flexed posture in sitting edge of bed- support from upper handrail for stability. extra time to correct posture.    Transfers:  Sit to Stand: Minimal Assistance

## 2023-12-26 PROBLEM — G82.50 TETRAPARESIS (HCC): Status: ACTIVE | Noted: 2023-12-26

## 2023-12-26 LAB
EKG ATRIAL RATE: 79 BPM
EKG P AXIS: 41 DEGREES
EKG P-R INTERVAL: 160 MS
EKG Q-T INTERVAL: 406 MS
EKG QRS DURATION: 74 MS
EKG QTC CALCULATION (BAZETT): 465 MS
EKG R AXIS: 95 DEGREES
EKG T AXIS: 6 DEGREES
EKG VENTRICULAR RATE: 79 BPM

## 2023-12-26 PROCEDURE — 36415 COLL VENOUS BLD VENIPUNCTURE: CPT

## 2023-12-26 PROCEDURE — 99231 SBSQ HOSP IP/OBS SF/LOW 25: CPT | Performed by: INTERNAL MEDICINE

## 2023-12-26 PROCEDURE — 84425 ASSAY OF VITAMIN B-1: CPT

## 2023-12-26 PROCEDURE — 6370000000 HC RX 637 (ALT 250 FOR IP): Performed by: PSYCHIATRY & NEUROLOGY

## 2023-12-26 PROCEDURE — 6360000002 HC RX W HCPCS: Performed by: INTERNAL MEDICINE

## 2023-12-26 PROCEDURE — 99231 SBSQ HOSP IP/OBS SF/LOW 25: CPT | Performed by: PSYCHIATRY & NEUROLOGY

## 2023-12-26 PROCEDURE — 6370000000 HC RX 637 (ALT 250 FOR IP): Performed by: INTERNAL MEDICINE

## 2023-12-26 PROCEDURE — 99232 SBSQ HOSP IP/OBS MODERATE 35: CPT | Performed by: STUDENT IN AN ORGANIZED HEALTH CARE EDUCATION/TRAINING PROGRAM

## 2023-12-26 PROCEDURE — 97530 THERAPEUTIC ACTIVITIES: CPT

## 2023-12-26 PROCEDURE — 97116 GAIT TRAINING THERAPY: CPT

## 2023-12-26 PROCEDURE — 1180000000 HC REHAB R&B

## 2023-12-26 PROCEDURE — 6370000000 HC RX 637 (ALT 250 FOR IP): Performed by: PHYSICAL MEDICINE & REHABILITATION

## 2023-12-26 PROCEDURE — 97535 SELF CARE MNGMENT TRAINING: CPT

## 2023-12-26 PROCEDURE — 97110 THERAPEUTIC EXERCISES: CPT

## 2023-12-26 RX ADMIN — BUSPIRONE HYDROCHLORIDE 30 MG: 15 TABLET ORAL at 08:07

## 2023-12-26 RX ADMIN — BUSPIRONE HYDROCHLORIDE 30 MG: 15 TABLET ORAL at 20:18

## 2023-12-26 RX ADMIN — CARVEDILOL 3.12 MG: 3.12 TABLET, FILM COATED ORAL at 15:47

## 2023-12-26 RX ADMIN — FOLIC ACID 1 MG: 1 TABLET ORAL at 08:07

## 2023-12-26 RX ADMIN — ENOXAPARIN SODIUM 40 MG: 100 INJECTION SUBCUTANEOUS at 08:05

## 2023-12-26 RX ADMIN — METHADONE HYDROCHLORIDE 10 MG: 10 TABLET ORAL at 08:06

## 2023-12-26 RX ADMIN — ASPIRIN 81 MG: 81 TABLET, COATED ORAL at 08:06

## 2023-12-26 RX ADMIN — DILTIAZEM HYDROCHLORIDE 60 MG: 60 TABLET, FILM COATED ORAL at 20:18

## 2023-12-26 RX ADMIN — METHADONE HYDROCHLORIDE 10 MG: 10 TABLET ORAL at 20:18

## 2023-12-26 RX ADMIN — ACETAMINOPHEN 650 MG: 325 TABLET ORAL at 16:18

## 2023-12-26 RX ADMIN — ATORVASTATIN CALCIUM 40 MG: 40 TABLET, FILM COATED ORAL at 20:18

## 2023-12-26 RX ADMIN — CARVEDILOL 3.12 MG: 3.12 TABLET, FILM COATED ORAL at 08:07

## 2023-12-26 RX ADMIN — DILTIAZEM HYDROCHLORIDE 60 MG: 60 TABLET, FILM COATED ORAL at 06:39

## 2023-12-26 RX ADMIN — DOCUSATE SODIUM 100 MG: 100 CAPSULE, LIQUID FILLED ORAL at 20:18

## 2023-12-26 RX ADMIN — DILTIAZEM HYDROCHLORIDE 60 MG: 60 TABLET, FILM COATED ORAL at 16:18

## 2023-12-26 RX ADMIN — PANTOPRAZOLE SODIUM 40 MG: 40 TABLET, DELAYED RELEASE ORAL at 08:07

## 2023-12-26 RX ADMIN — LISINOPRIL 2.5 MG: 5 TABLET ORAL at 08:09

## 2023-12-26 RX ADMIN — LEVOTHYROXINE SODIUM 100 MCG: 0.1 TABLET ORAL at 06:38

## 2023-12-26 RX ADMIN — DOCUSATE SODIUM 100 MG: 100 CAPSULE, LIQUID FILLED ORAL at 08:06

## 2023-12-26 RX ADMIN — QUETIAPINE FUMARATE 12.5 MG: 25 TABLET ORAL at 17:30

## 2023-12-26 ASSESSMENT — PAIN SCALES - WONG BAKER

## 2023-12-26 ASSESSMENT — PAIN DESCRIPTION - DESCRIPTORS
DESCRIPTORS: ACHING
DESCRIPTORS: SORE
DESCRIPTORS: ACHING

## 2023-12-26 ASSESSMENT — PAIN SCALES - GENERAL
PAINLEVEL_OUTOF10: 8
PAINLEVEL_OUTOF10: 7
PAINLEVEL_OUTOF10: 7

## 2023-12-26 ASSESSMENT — PAIN DESCRIPTION - ORIENTATION
ORIENTATION: MID
ORIENTATION: RIGHT;LEFT;MID

## 2023-12-26 ASSESSMENT — PAIN DESCRIPTION - LOCATION
LOCATION: NECK
LOCATION: NECK;BACK;LEG
LOCATION: NECK

## 2023-12-27 LAB
ANION GAP SERPL CALCULATED.3IONS-SCNC: 7 MMOL/L (ref 9–17)
BASOPHILS # BLD: 0.05 K/UL (ref 0–0.2)
BASOPHILS NFR BLD: 1 % (ref 0–2)
BUN SERPL-MCNC: 10 MG/DL (ref 8–23)
CALCIUM SERPL-MCNC: 8.4 MG/DL (ref 8.6–10.4)
CHLORIDE SERPL-SCNC: 102 MMOL/L (ref 98–107)
CO2 SERPL-SCNC: 25 MMOL/L (ref 20–31)
CREAT SERPL-MCNC: 0.6 MG/DL (ref 0.5–0.9)
EOSINOPHIL # BLD: 0.05 K/UL (ref 0–0.4)
EOSINOPHILS RELATIVE PERCENT: 1 % (ref 0–4)
ERYTHROCYTE [DISTWIDTH] IN BLOOD BY AUTOMATED COUNT: 13.8 % (ref 11.5–14.9)
GFR SERPL CREATININE-BSD FRML MDRD: >60 ML/MIN/1.73M2
GLUCOSE SERPL-MCNC: 81 MG/DL (ref 70–99)
HCT VFR BLD AUTO: 25.4 % (ref 36–46)
HGB BLD-MCNC: 8.2 G/DL (ref 12–16)
LYMPHOCYTES NFR BLD: 0.59 K/UL (ref 1–4.8)
LYMPHOCYTES RELATIVE PERCENT: 13 % (ref 24–44)
MCH RBC QN AUTO: 37 PG (ref 26–34)
MCHC RBC AUTO-ENTMCNC: 32.4 G/DL (ref 31–37)
MCV RBC AUTO: 114.1 FL (ref 80–100)
MONOCYTES NFR BLD: 0.45 K/UL (ref 0.1–1.3)
MONOCYTES NFR BLD: 10 % (ref 1–7)
MORPHOLOGY: ABNORMAL
NEUTROPHILS NFR BLD: 75 % (ref 36–66)
NEUTS SEG NFR BLD: 3.36 K/UL (ref 1.3–9.1)
PLATELET # BLD AUTO: 254 K/UL (ref 150–450)
PMV BLD AUTO: 8.1 FL (ref 6–12)
POTASSIUM SERPL-SCNC: 3.8 MMOL/L (ref 3.7–5.3)
RBC # BLD AUTO: 2.22 M/UL (ref 4–5.2)
SODIUM SERPL-SCNC: 134 MMOL/L (ref 135–144)
WBC OTHER # BLD: 4.5 K/UL (ref 3.5–11)

## 2023-12-27 PROCEDURE — 6360000002 HC RX W HCPCS: Performed by: INTERNAL MEDICINE

## 2023-12-27 PROCEDURE — 6370000000 HC RX 637 (ALT 250 FOR IP): Performed by: INTERNAL MEDICINE

## 2023-12-27 PROCEDURE — 97530 THERAPEUTIC ACTIVITIES: CPT

## 2023-12-27 PROCEDURE — 97110 THERAPEUTIC EXERCISES: CPT

## 2023-12-27 PROCEDURE — 6370000000 HC RX 637 (ALT 250 FOR IP): Performed by: PHYSICAL MEDICINE & REHABILITATION

## 2023-12-27 PROCEDURE — 99231 SBSQ HOSP IP/OBS SF/LOW 25: CPT | Performed by: INTERNAL MEDICINE

## 2023-12-27 PROCEDURE — 80048 BASIC METABOLIC PNL TOTAL CA: CPT

## 2023-12-27 PROCEDURE — 36415 COLL VENOUS BLD VENIPUNCTURE: CPT

## 2023-12-27 PROCEDURE — 1180000000 HC REHAB R&B

## 2023-12-27 PROCEDURE — 97535 SELF CARE MNGMENT TRAINING: CPT

## 2023-12-27 PROCEDURE — 85025 COMPLETE CBC W/AUTO DIFF WBC: CPT

## 2023-12-27 PROCEDURE — 6370000000 HC RX 637 (ALT 250 FOR IP): Performed by: PSYCHIATRY & NEUROLOGY

## 2023-12-27 PROCEDURE — 97116 GAIT TRAINING THERAPY: CPT

## 2023-12-27 PROCEDURE — 99232 SBSQ HOSP IP/OBS MODERATE 35: CPT | Performed by: STUDENT IN AN ORGANIZED HEALTH CARE EDUCATION/TRAINING PROGRAM

## 2023-12-27 RX ADMIN — DILTIAZEM HYDROCHLORIDE 60 MG: 60 TABLET, FILM COATED ORAL at 05:43

## 2023-12-27 RX ADMIN — FOLIC ACID 1 MG: 1 TABLET ORAL at 07:35

## 2023-12-27 RX ADMIN — ACETAMINOPHEN 650 MG: 325 TABLET ORAL at 03:00

## 2023-12-27 RX ADMIN — QUETIAPINE FUMARATE 12.5 MG: 25 TABLET ORAL at 20:23

## 2023-12-27 RX ADMIN — METHADONE HYDROCHLORIDE 10 MG: 10 TABLET ORAL at 20:22

## 2023-12-27 RX ADMIN — ENOXAPARIN SODIUM 40 MG: 100 INJECTION SUBCUTANEOUS at 07:34

## 2023-12-27 RX ADMIN — SENNOSIDES 17.2 MG: 8.6 TABLET, FILM COATED ORAL at 20:22

## 2023-12-27 RX ADMIN — DILTIAZEM HYDROCHLORIDE 60 MG: 60 TABLET, FILM COATED ORAL at 14:11

## 2023-12-27 RX ADMIN — BUSPIRONE HYDROCHLORIDE 30 MG: 15 TABLET ORAL at 20:22

## 2023-12-27 RX ADMIN — METHADONE HYDROCHLORIDE 10 MG: 10 TABLET ORAL at 07:35

## 2023-12-27 RX ADMIN — CARVEDILOL 3.12 MG: 3.12 TABLET, FILM COATED ORAL at 07:35

## 2023-12-27 RX ADMIN — CARVEDILOL 3.12 MG: 3.12 TABLET, FILM COATED ORAL at 15:53

## 2023-12-27 RX ADMIN — DOCUSATE SODIUM 100 MG: 100 CAPSULE, LIQUID FILLED ORAL at 07:35

## 2023-12-27 RX ADMIN — BUSPIRONE HYDROCHLORIDE 30 MG: 15 TABLET ORAL at 07:35

## 2023-12-27 RX ADMIN — DILTIAZEM HYDROCHLORIDE 60 MG: 60 TABLET, FILM COATED ORAL at 20:33

## 2023-12-27 RX ADMIN — DOCUSATE SODIUM 100 MG: 100 CAPSULE, LIQUID FILLED ORAL at 20:22

## 2023-12-27 RX ADMIN — ATORVASTATIN CALCIUM 40 MG: 40 TABLET, FILM COATED ORAL at 20:22

## 2023-12-27 RX ADMIN — ASPIRIN 81 MG: 81 TABLET, COATED ORAL at 07:35

## 2023-12-27 RX ADMIN — LEVOTHYROXINE SODIUM 100 MCG: 0.1 TABLET ORAL at 05:43

## 2023-12-27 RX ADMIN — PANTOPRAZOLE SODIUM 40 MG: 40 TABLET, DELAYED RELEASE ORAL at 07:35

## 2023-12-27 RX ADMIN — LISINOPRIL 2.5 MG: 5 TABLET ORAL at 07:35

## 2023-12-27 ASSESSMENT — PAIN SCALES - WONG BAKER

## 2023-12-27 ASSESSMENT — PAIN SCALES - GENERAL
PAINLEVEL_OUTOF10: 7
PAINLEVEL_OUTOF10: 7

## 2023-12-27 ASSESSMENT — PAIN DESCRIPTION - LOCATION
LOCATION: BACK;NECK
LOCATION: NECK;LEG

## 2023-12-27 ASSESSMENT — PAIN DESCRIPTION - DESCRIPTORS: DESCRIPTORS: ACHING

## 2023-12-28 LAB — VIT B1 PYROPHOSHATE BLD-SCNC: 59 NMOL/L (ref 70–180)

## 2023-12-28 PROCEDURE — 6360000002 HC RX W HCPCS: Performed by: INTERNAL MEDICINE

## 2023-12-28 PROCEDURE — 1180000000 HC REHAB R&B

## 2023-12-28 PROCEDURE — 6370000000 HC RX 637 (ALT 250 FOR IP): Performed by: PSYCHIATRY & NEUROLOGY

## 2023-12-28 PROCEDURE — 97110 THERAPEUTIC EXERCISES: CPT

## 2023-12-28 PROCEDURE — 97530 THERAPEUTIC ACTIVITIES: CPT

## 2023-12-28 PROCEDURE — 6370000000 HC RX 637 (ALT 250 FOR IP): Performed by: PHYSICAL MEDICINE & REHABILITATION

## 2023-12-28 PROCEDURE — 99231 SBSQ HOSP IP/OBS SF/LOW 25: CPT | Performed by: INTERNAL MEDICINE

## 2023-12-28 PROCEDURE — 99232 SBSQ HOSP IP/OBS MODERATE 35: CPT | Performed by: NURSE PRACTITIONER

## 2023-12-28 PROCEDURE — 97535 SELF CARE MNGMENT TRAINING: CPT

## 2023-12-28 PROCEDURE — 97129 THER IVNTJ 1ST 15 MIN: CPT

## 2023-12-28 PROCEDURE — 6370000000 HC RX 637 (ALT 250 FOR IP): Performed by: INTERNAL MEDICINE

## 2023-12-28 PROCEDURE — 99232 SBSQ HOSP IP/OBS MODERATE 35: CPT | Performed by: STUDENT IN AN ORGANIZED HEALTH CARE EDUCATION/TRAINING PROGRAM

## 2023-12-28 PROCEDURE — 97130 THER IVNTJ EA ADDL 15 MIN: CPT

## 2023-12-28 PROCEDURE — 97116 GAIT TRAINING THERAPY: CPT

## 2023-12-28 RX ORDER — ENOXAPARIN SODIUM 100 MG/ML
30 INJECTION SUBCUTANEOUS DAILY
Status: DISCONTINUED | OUTPATIENT
Start: 2023-12-29 | End: 2024-01-02 | Stop reason: HOSPADM

## 2023-12-28 RX ADMIN — METHADONE HYDROCHLORIDE 10 MG: 10 TABLET ORAL at 20:31

## 2023-12-28 RX ADMIN — ATORVASTATIN CALCIUM 40 MG: 40 TABLET, FILM COATED ORAL at 20:31

## 2023-12-28 RX ADMIN — QUETIAPINE FUMARATE 12.5 MG: 25 TABLET ORAL at 17:24

## 2023-12-28 RX ADMIN — DILTIAZEM HYDROCHLORIDE 60 MG: 60 TABLET, FILM COATED ORAL at 06:01

## 2023-12-28 RX ADMIN — DOCUSATE SODIUM 100 MG: 100 CAPSULE, LIQUID FILLED ORAL at 08:02

## 2023-12-28 RX ADMIN — DOCUSATE SODIUM 100 MG: 100 CAPSULE, LIQUID FILLED ORAL at 20:31

## 2023-12-28 RX ADMIN — LEVOTHYROXINE SODIUM 100 MCG: 0.1 TABLET ORAL at 06:01

## 2023-12-28 RX ADMIN — ENOXAPARIN SODIUM 40 MG: 100 INJECTION SUBCUTANEOUS at 08:02

## 2023-12-28 RX ADMIN — POLYETHYLENE GLYCOL 3350 17 G: 17 POWDER, FOR SOLUTION ORAL at 08:02

## 2023-12-28 RX ADMIN — DILTIAZEM HYDROCHLORIDE 60 MG: 60 TABLET, FILM COATED ORAL at 14:12

## 2023-12-28 RX ADMIN — BUSPIRONE HYDROCHLORIDE 30 MG: 15 TABLET ORAL at 08:02

## 2023-12-28 RX ADMIN — LISINOPRIL 2.5 MG: 5 TABLET ORAL at 08:03

## 2023-12-28 RX ADMIN — METHADONE HYDROCHLORIDE 10 MG: 10 TABLET ORAL at 08:02

## 2023-12-28 RX ADMIN — PANTOPRAZOLE SODIUM 40 MG: 40 TABLET, DELAYED RELEASE ORAL at 08:02

## 2023-12-28 RX ADMIN — BUSPIRONE HYDROCHLORIDE 30 MG: 15 TABLET ORAL at 20:31

## 2023-12-28 RX ADMIN — ACETAMINOPHEN 650 MG: 325 TABLET ORAL at 17:24

## 2023-12-28 RX ADMIN — FOLIC ACID 1 MG: 1 TABLET ORAL at 08:02

## 2023-12-28 RX ADMIN — ASPIRIN 81 MG: 81 TABLET, COATED ORAL at 08:02

## 2023-12-28 RX ADMIN — CARVEDILOL 3.12 MG: 3.12 TABLET, FILM COATED ORAL at 08:03

## 2023-12-28 RX ADMIN — CARVEDILOL 3.12 MG: 3.12 TABLET, FILM COATED ORAL at 17:23

## 2023-12-28 RX ADMIN — DILTIAZEM HYDROCHLORIDE 60 MG: 60 TABLET, FILM COATED ORAL at 20:31

## 2023-12-28 ASSESSMENT — PAIN SCALES - WONG BAKER

## 2023-12-28 ASSESSMENT — PAIN SCALES - GENERAL
PAINLEVEL_OUTOF10: 4
PAINLEVEL_OUTOF10: 8

## 2023-12-28 ASSESSMENT — PAIN DESCRIPTION - LOCATION: LOCATION: NECK

## 2023-12-29 PROCEDURE — 6370000000 HC RX 637 (ALT 250 FOR IP): Performed by: INTERNAL MEDICINE

## 2023-12-29 PROCEDURE — 97530 THERAPEUTIC ACTIVITIES: CPT

## 2023-12-29 PROCEDURE — 97535 SELF CARE MNGMENT TRAINING: CPT

## 2023-12-29 PROCEDURE — 1180000000 HC REHAB R&B

## 2023-12-29 PROCEDURE — 97116 GAIT TRAINING THERAPY: CPT

## 2023-12-29 PROCEDURE — 97110 THERAPEUTIC EXERCISES: CPT

## 2023-12-29 PROCEDURE — 97130 THER IVNTJ EA ADDL 15 MIN: CPT

## 2023-12-29 PROCEDURE — 6370000000 HC RX 637 (ALT 250 FOR IP): Performed by: PHYSICAL MEDICINE & REHABILITATION

## 2023-12-29 PROCEDURE — 97129 THER IVNTJ 1ST 15 MIN: CPT

## 2023-12-29 PROCEDURE — 6360000002 HC RX W HCPCS: Performed by: INTERNAL MEDICINE

## 2023-12-29 PROCEDURE — 99232 SBSQ HOSP IP/OBS MODERATE 35: CPT | Performed by: STUDENT IN AN ORGANIZED HEALTH CARE EDUCATION/TRAINING PROGRAM

## 2023-12-29 PROCEDURE — 99232 SBSQ HOSP IP/OBS MODERATE 35: CPT | Performed by: INTERNAL MEDICINE

## 2023-12-29 PROCEDURE — 6370000000 HC RX 637 (ALT 250 FOR IP): Performed by: PSYCHIATRY & NEUROLOGY

## 2023-12-29 RX ORDER — GAUZE BANDAGE 2" X 2"
100 BANDAGE TOPICAL DAILY
Status: DISCONTINUED | OUTPATIENT
Start: 2023-12-30 | End: 2024-01-02 | Stop reason: HOSPADM

## 2023-12-29 RX ORDER — LISINOPRIL 5 MG/1
5 TABLET ORAL DAILY
Status: DISCONTINUED | OUTPATIENT
Start: 2023-12-30 | End: 2024-01-02 | Stop reason: HOSPADM

## 2023-12-29 RX ADMIN — QUETIAPINE FUMARATE 12.5 MG: 25 TABLET ORAL at 17:11

## 2023-12-29 RX ADMIN — CARVEDILOL 3.12 MG: 3.12 TABLET, FILM COATED ORAL at 17:11

## 2023-12-29 RX ADMIN — ATORVASTATIN CALCIUM 40 MG: 40 TABLET, FILM COATED ORAL at 20:26

## 2023-12-29 RX ADMIN — DILTIAZEM HYDROCHLORIDE 60 MG: 60 TABLET, FILM COATED ORAL at 20:27

## 2023-12-29 RX ADMIN — DILTIAZEM HYDROCHLORIDE 60 MG: 60 TABLET, FILM COATED ORAL at 15:23

## 2023-12-29 RX ADMIN — ASPIRIN 81 MG: 81 TABLET, COATED ORAL at 10:34

## 2023-12-29 RX ADMIN — METHADONE HYDROCHLORIDE 10 MG: 10 TABLET ORAL at 10:34

## 2023-12-29 RX ADMIN — ENOXAPARIN SODIUM 30 MG: 100 INJECTION SUBCUTANEOUS at 10:35

## 2023-12-29 RX ADMIN — DILTIAZEM HYDROCHLORIDE 60 MG: 60 TABLET, FILM COATED ORAL at 06:02

## 2023-12-29 RX ADMIN — PANTOPRAZOLE SODIUM 40 MG: 40 TABLET, DELAYED RELEASE ORAL at 10:34

## 2023-12-29 RX ADMIN — BUSPIRONE HYDROCHLORIDE 30 MG: 15 TABLET ORAL at 20:26

## 2023-12-29 RX ADMIN — LISINOPRIL 2.5 MG: 5 TABLET ORAL at 10:34

## 2023-12-29 RX ADMIN — FOLIC ACID 1 MG: 1 TABLET ORAL at 10:34

## 2023-12-29 RX ADMIN — BUSPIRONE HYDROCHLORIDE 30 MG: 15 TABLET ORAL at 10:34

## 2023-12-29 RX ADMIN — DOCUSATE SODIUM 100 MG: 100 CAPSULE, LIQUID FILLED ORAL at 10:34

## 2023-12-29 RX ADMIN — CARVEDILOL 3.12 MG: 3.12 TABLET, FILM COATED ORAL at 10:34

## 2023-12-29 RX ADMIN — DOCUSATE SODIUM 100 MG: 100 CAPSULE, LIQUID FILLED ORAL at 20:27

## 2023-12-29 RX ADMIN — METHADONE HYDROCHLORIDE 10 MG: 10 TABLET ORAL at 20:26

## 2023-12-29 RX ADMIN — LEVOTHYROXINE SODIUM 100 MCG: 0.1 TABLET ORAL at 06:02

## 2023-12-29 RX ADMIN — POLYETHYLENE GLYCOL 3350 17 G: 17 POWDER, FOR SOLUTION ORAL at 10:35

## 2023-12-29 ASSESSMENT — PAIN SCALES - WONG BAKER

## 2023-12-29 NOTE — CONSULTS
[facility] Internal Medicine  Oscar Bueno MD; Juan Miguel Heller MD; Surendra Little MD; MD Yanna Portillo MD; Urvashi Meredith MD    [facility] Internal Medicine   IN-PATIENT SERVICE   [facility]    CONSULTATION / HISTORY AND PHYSICAL EXAMINATION            Date:   12/25/2023  Patient name:  Rajani Iglesias  Date of admission:  12/20/2023  8:59 PM  MRN:   [id number]  Account:  [id number]  YOB: 1960  PCP:    Giovanna Brasher MD  Room:   [address] [place] [address]  Code Status:    Full Code    Physician Requesting Consult: Christy Lynn MD    Reason for Consult:  med management     Chief Complaint:     No chief complaint on file.  Debility     History Obtained From:     patient    History of Present Illness:     Rajani is a 63-year-old ill-appearing, malnourished, pleasant lady who presents with difficulty swallowing, decreased energy, decreased appetite, and decreased motivation.    She has a history of multiple spinal surgeries, cauda equina syndrome, breast cancer, GERD, hypertension, hypothyroidism, and osteoporosis. She works with an outpatient pain clinic for the management of severe back pain.  She takes hydromorphone and hydrocodone.  Her pain is not well-managed.  She also has a history of alcohol abuse.  The patient and her daughter state she drastically scaled back her alcohol use 4 years ago.  She states she has a small drink twice a month.  The patient reports a severely decreased appetite with difficulty swallowing.  She reports extreme fatigue and difficulty ambulating in the home.  Denies fever, chills, chest pain, cough, abdominal pain, nausea, diarrhea, and urinary symptoms.  There are no aggravating or alleviating factors.    Patient, underwent CT abdomen pelvis, which was negative  Getting evaluated by speech pathologist, on special diet   Found to have UTI   Treated with IV rocephin   Much better   Admitted to ARU now     Past Medical 
    [facility] Internal Medicine  Oscar Bueno MD; Juan Miguel Heller MD; Surendra Little MD; MD Yanna Portillo MD; Urvashi Meredith MD    [facility] Internal Medicine   IN-PATIENT SERVICE   [facility]    CONSULTATION / HISTORY AND PHYSICAL EXAMINATION            Date:   12/27/2023  Patient name:  Rajani Iglesias  Date of admission:  12/20/2023  8:59 PM  MRN:   [id number]  Account:  [id number]  YOB: 1960  PCP:    Giovanna Brasher MD  Room:   [address] [place] [address]  Code Status:    Full Code    Physician Requesting Consult: Christy Lynn MD    Reason for Consult:  med management     Chief Complaint:     No chief complaint on file.  Debility     History Obtained From:     patient    History of Present Illness:     Rajani is a 63-year-old ill-appearing, malnourished, pleasant lady who presents with difficulty swallowing, decreased energy, decreased appetite, and decreased motivation.    She has a history of multiple spinal surgeries, cauda equina syndrome, breast cancer, GERD, hypertension, hypothyroidism, and osteoporosis. She works with an outpatient pain clinic for the management of severe back pain.  She takes hydromorphone and hydrocodone.  Her pain is not well-managed.  She also has a history of alcohol abuse.  The patient and her daughter state she drastically scaled back her alcohol use 4 years ago.  She states she has a small drink twice a month.  The patient reports a severely decreased appetite with difficulty swallowing.  She reports extreme fatigue and difficulty ambulating in the home.  Denies fever, chills, chest pain, cough, abdominal pain, nausea, diarrhea, and urinary symptoms.  There are no aggravating or alleviating factors.    Patient, underwent CT abdomen pelvis, which was negative  Getting evaluated by speech pathologist, on special diet   Found to have UTI   Treated with IV rocephin   Much better   Admitted to ARU now     Past Medical 
    [facility] Internal Medicine  Oscar Bueno MD; Juan Miguel Heller MD; Surendra Little MD; MD Yanna Portillo MD; Urvashi Meredith MD    [facility] Internal Medicine   IN-PATIENT SERVICE   [facility]    CONSULTATION / HISTORY AND PHYSICAL EXAMINATION            Date:   12/23/2023  Patient name:  Rajani Iglesias  Date of admission:  12/20/2023  8:59 PM  MRN:   [id number]  Account:  [id number]  YOB: 1960  PCP:    Giovnana Brasher MD  Room:   [address] [place] [address]  Code Status:    Full Code    Physician Requesting Consult: Christy Lynn MD    Reason for Consult:  med management     Chief Complaint:     No chief complaint on file.  Debility     History Obtained From:     patient    History of Present Illness:     Rajani is a 63-year-old ill-appearing, malnourished, pleasant lady who presents with difficulty swallowing, decreased energy, decreased appetite, and decreased motivation.    She has a history of multiple spinal surgeries, cauda equina syndrome, breast cancer, GERD, hypertension, hypothyroidism, and osteoporosis. She works with an outpatient pain clinic for the management of severe back pain.  She takes hydromorphone and hydrocodone.  Her pain is not well-managed.  She also has a history of alcohol abuse.  The patient and her daughter state she drastically scaled back her alcohol use 4 years ago.  She states she has a small drink twice a month.  The patient reports a severely decreased appetite with difficulty swallowing.  She reports extreme fatigue and difficulty ambulating in the home.  Denies fever, chills, chest pain, cough, abdominal pain, nausea, diarrhea, and urinary symptoms.  There are no aggravating or alleviating factors.    Patient, underwent CT abdomen pelvis, which was negative  Getting evaluated by speech pathologist, on special diet   Found to have UTI   Treated with IV rocephin   Much better   Admitted to ARU now     Past Medical 
    [facility] Internal Medicine  Oscar Bueno MD; Juan Miguel Heller MD; Surendra Little MD; MD Yanna Portillo MD; Urvashi Meredith MD    [facility] Internal Medicine   IN-PATIENT SERVICE   [facility]    CONSULTATION / HISTORY AND PHYSICAL EXAMINATION            Date:   12/29/2023  Patient name:  Rajani Iglesias  Date of admission:  12/20/2023  8:59 PM  MRN:   [id number]  Account:  [id number]  YOB: 1960  PCP:    Giovanna Brasher MD  Room:   [address] [place] [address]  Code Status:    Full Code    Physician Requesting Consult: Christy Lynn MD    Reason for Consult:  med management     Chief Complaint:     No chief complaint on file.  Debility     History Obtained From:     patient    History of Present Illness:     Rajani is a 63-year-old ill-appearing, malnourished, pleasant lady who presents with difficulty swallowing, decreased energy, decreased appetite, and decreased motivation.    She has a history of multiple spinal surgeries, cauda equina syndrome, breast cancer, GERD, hypertension, hypothyroidism, and osteoporosis. She works with an outpatient pain clinic for the management of severe back pain.  She takes hydromorphone and hydrocodone.  Her pain is not well-managed.  She also has a history of alcohol abuse.  The patient and her daughter state she drastically scaled back her alcohol use 4 years ago.  She states she has a small drink twice a month.  The patient reports a severely decreased appetite with difficulty swallowing.  She reports extreme fatigue and difficulty ambulating in the home.  Denies fever, chills, chest pain, cough, abdominal pain, nausea, diarrhea, and urinary symptoms.  There are no aggravating or alleviating factors.    Patient, underwent CT abdomen pelvis, which was negative  Getting evaluated by speech pathologist, on special diet   Found to have UTI   Treated with IV rocephin   Much better   Admitted to ARU now     Past Medical 
    [facility] Internal Medicine  Oscar Bueno MD; Juan Miguel Heller MD; Surendra Little MD; MD Yanna Portillo MD; Urvashi Meredith MD    [facility] Internal Medicine   IN-PATIENT SERVICE   [facility]    CONSULTATION / HISTORY AND PHYSICAL EXAMINATION            Date:   12/22/2023  Patient name:  Rajani Iglesias  Date of admission:  12/20/2023  8:59 PM  MRN:   [id number]  Account:  [id number]  YOB: 1960  PCP:    Giovanna Brasher MD  Room:   [address] [place] [address]  Code Status:    Full Code    Physician Requesting Consult: Christy Lynn MD    Reason for Consult:  med management     Chief Complaint:     No chief complaint on file.  Debility     History Obtained From:     patient    History of Present Illness:     Rajani is a 63-year-old ill-appearing, malnourished, pleasant lady who presents with difficulty swallowing, decreased energy, decreased appetite, and decreased motivation.    She has a history of multiple spinal surgeries, cauda equina syndrome, breast cancer, GERD, hypertension, hypothyroidism, and osteoporosis. She works with an outpatient pain clinic for the management of severe back pain.  She takes hydromorphone and hydrocodone.  Her pain is not well-managed.  She also has a history of alcohol abuse.  The patient and her daughter state she drastically scaled back her alcohol use 4 years ago.  She states she has a small drink twice a month.  The patient reports a severely decreased appetite with difficulty swallowing.  She reports extreme fatigue and difficulty ambulating in the home.  Denies fever, chills, chest pain, cough, abdominal pain, nausea, diarrhea, and urinary symptoms.  There are no aggravating or alleviating factors.    Patient, underwent CT abdomen pelvis, which was negative  Getting evaluated by speech pathologist, on special diet   Found to have UTI   Treated with IV rocephin   Much better   Admitted to ARU now     Past Medical 
  [facility] Neurology   IN-PATIENT SERVICE      NEUROLOGY CONSULT  NOTE            Date:   12/24/2023  Patient name:  Rajani Iglesias  Date of admission:  12/20/2023  YOB: 1960      Reason for Consult:      Abnormal CT brain    History of Present Illness:     63 year old female with past medical history of multiple spine surgeries including for cervical stenosis with myelopathy, cauda equina syndrome, hx of chronic pain on methadone and Norco, hypothyroidism, who is currently admitted to ARU after recent hospitalization at our hospital. Neurology consulted for abnormal finding on CT brain. History obtained from patient, family at bedside, and chart review.    Patient was recently seen by our service. Per Dr. Galvan's note from 12/15/2023:    The patient is a 63 y.o. female who presents with Fatigue  . The patient was seen and examined and the chart was reviewed.  Presents to the hospital 12/12 with complaints of fatigue, generalized weakness, decreased energy.  Found to have elevated troponins, admitted for further workup.  She has history of multiple spine surgeries and chronic back pain on Norco and methadone at home.  She has been evaluated by cardiology for elevated troponin suggestive of type II and no acute coronary syndrome.  Blood pressure meds modified, 2D echo was checked; EF 50 to 55%, LA, RA normal size.  Psychiatry also consulted due to anxiety and depression.  It is reported that she had been on BuSpar in the past but did not work very well for her.  She has history of chronic alcoholism but is no longer daily drinker.  She was started on Zoloft on 12/13.  It was then noted on early morning of 12/14 that patient started to have some hallucinations, agitation and disorientation. Telesitter needed to be initiated.  Patient was also given Ativan 0.5 mg around 6 PM.  Discharge plans were held due to this and neurology consulted.  As of this morning 12/15 it seems that patient is much improved.  
Department of Psychiatry  Consult Progress Note  12/28/2023    Patient Name: Rajani Iglesias    Reason for initial consult:  Anxiety and depression          Interval History:      Rajani is interviewed today at bedside, she is seated upright in a reclining chair.  She reports that she participated in physical therapy earlier today.  She has been taking Seroquel nightly and reports a significant improvement in her sleep.  She reports some improvement in visual hallucinations.  She states that over the last year she has been experiencing visual hallucinations in the form of animals.  She denies any auditory hallucinations.  She reports her overall mood is good.  She denies any irritability or agitation.  Staff documentation indicates that she is also doing better.  She did inquire as to who would prescribe the Seroquel upon discharge.  Her primary care physician is Dr. Brasher, it is reasonable for Dr. Brasher to continue this medication if she feels comfortable or placed a referral for outpatient psychiatry.  Based on improvement in symptoms, there are no additional recommendations for medication adjustment.         Mental Status Exam  Level of consciousness: Alert and awake   Appearance: Appropriate attire for setting, seated in chair, with good  grooming and hygiene   Behavior/Motor: Approachable, engages with interviewer, no psychomotor abnormalities   Attitude toward examiner: Cooperative, attentive, good eye contact  Speech: Normal rate, volume and tone  Mood: \"Good\"  Affect: Congruent  Thought processes: Linear and coherent  Thought content:  Denies homicidal ideation  Suicidal Ideation: Denies suicidal ideations, contracts for safety on the unit.   Delusions: No evidence of delusions.   Perceptual Disturbance: Visual hallucinations improving, patient does not appear to be responding to internal stimuli.   Cognition: Oriented to self, location, time, and situation  Memory: intact  Insight: fair   Judgement: fair 
lesions, rashes, bruising or bleeding on exposed skin area  Extremities:  peripheral pulses palpable, no pedal edema or calf pain with palpation  Psych: normal affect    Investigations:      Laboratory Testing:  Recent Results (from the past 24 hour(s))   Basic Metabolic Panel    Collection Time: 12/24/23  7:18 AM   Result Value Ref Range    Sodium 134 (L) 135 - 144 mmol/L    Potassium 3.6 (L) 3.7 - 5.3 mmol/L    Chloride 99 98 - 107 mmol/L    CO2 26 20 - 31 mmol/L    Anion Gap 9 9 - 17 mmol/L    Glucose 77 70 - 99 mg/dL    BUN 16 8 - 23 mg/dL    Creatinine 0.8 0.5 - 0.9 mg/dL    Est, Glom Filt Rate >60 >60 mL/min/1.73m2    Calcium 8.8 8.6 - 10.4 mg/dL   Magnesium    Collection Time: 12/24/23  7:18 AM   Result Value Ref Range    Magnesium 1.8 1.6 - 2.6 mg/dL   CBC    Collection Time: 12/24/23  7:18 AM   Result Value Ref Range    WBC 6.0 3.5 - 11.0 k/uL    RBC 2.13 (L) 4.0 - 5.2 m/uL    Hemoglobin 8.2 (L) 12.0 - 16.0 g/dL    Hematocrit 24.3 (L) 36 - 46 %    .0 (H) 80 - 100 fL    MCH 38.7 (H) 26 - 34 pg    MCHC 33.9 31 - 37 g/dL    RDW 14.3 11.5 - 14.9 %    Platelets 255 150 - 450 k/uL    MPV 8.3 6.0 - 12.0 fL   Urinalysis with Reflex to Culture    Collection Time: 12/24/23 12:44 PM    Specimen: Urine   Result Value Ref Range    Color, UA Dark Yellow (A) Yellow    Turbidity UA Clear Clear    Glucose, Ur NEGATIVE NEGATIVE mg/dL    Bilirubin Urine NEGATIVE NEGATIVE    Ketones, Urine TRACE (A) NEGATIVE mg/dL    Specific Gravity, UA 1.021 1.000 - 1.030    Urine Hgb NEGATIVE NEGATIVE    pH, UA 5.5 5.0 - 8.0    Protein, UA NEGATIVE NEGATIVE mg/dL    Urobilinogen, Urine Normal 0.0 - 1.0 EU/dL    Nitrite, Urine NEGATIVE NEGATIVE    Leukocyte Esterase, Urine NEGATIVE NEGATIVE   Microscopic Urinalysis    Collection Time: 12/24/23 12:44 PM   Result Value Ref Range    WBC, UA 0 TO 2 (A) 0 TO 5 /HPF    RBC, UA 0 TO 2 0 TO 2 /HPF    Casts UA 3 to 5 (A) None /LPF    Epithelial Cells UA 6 TO 9 /HPF    Bacteria, UA None None 
palpable, no pedal edema or calf pain with palpation  Psych: normal affect    Investigations:      Laboratory Testing:  No results found for this or any previous visit (from the past 24 hour(s)).          Imaging/Diagonstics:  MRI BRAIN WO CONTRAST    Result Date: 12/15/2023  No acute intracranial abnormality. Mild parenchymal volume loss. Mild chronic microvascular disease.       Assessment :      Hospital Problems             Last Modified POA    * (Principal) Quadriplegia, functional (HCC) 12/20/2023 Yes    Hypothyroidism 12/21/2023 Yes    Lumbar radiculopathy, chronic (Chronic) 12/21/2023 Yes    Acute cystitis without hematuria 12/21/2023 Yes    Delirium 12/25/2023 Yes    Abnormal CT of brain 12/24/2023 Yes    Encephalopathy 12/24/2023 Yes    Tetraparesis (HCC) 12/26/2023 Yes   Plan:     63-year-old female was admitted on the acute side and changed out for altered mental status chronic pain and difficulty swallowing and speech saw the patient, improved,  Functional quadriplegia, not able to get out of the bed move around, some chronic back pain,  Weight loss due to poor eating and depression, CT of the abdomen pelvis did not show any malignancy,  Hypokalemia replaced,  Hypomagnesemia replaced,  UTI, treated with Rocephin,  Had elevated troponin on the acute side, cardiology did not do any interventions,  For altered mental status neurology was consulted, MRI of the brain was normal,  Chronic pain syndrome patient was on Norco n methadone, Norco was stopped,  Hyponatremia, running 129, recheck labs,  Thank you for consultation    12/25/23    Ct Brain yesterday  MPRESSION:  1. No acute intracranial hemorrhage, midline shift, or mass effect.  2. Hypoattenuating focus in the left posterior limb of the corona radiata is  compatible with an age-indeterminate lacunar infarct.  3. Mild volume loss with probable sequela of chronic small vessel ischemic  change.   MRI due tomorrow .  Vitals stable        
3.6  Will monitor          12/25/23    Ct Brain yesterday  MPRESSION:  1. No acute intracranial hemorrhage, midline shift, or mass effect.  2. Hypoattenuating focus in the left posterior limb of the corona radiata is  compatible with an age-indeterminate lacunar infarct.  3. Mild volume loss with probable sequela of chronic small vessel ischemic  change.   MRI due tomorrow .  Vitals stable            12/26/23    No new symptoms  Vitals stable . Cardiopulmonary stable .    Continue current rx ,                 Consultations:   IP CONSULT TO DIETITIAN  IP CONSULT TO SOCIAL WORK  IP CONSULT TO INTERNAL MEDICINE  IP CONSULT TO PSYCHIATRY  IP CONSULT TO PSYCHIATRY  IP CONSULT TO NEUROLOGY      Juan Miguel Heller MD  12/26/2023  4:07 PM    Copy sent to Giovanna Nagel MD    Please note that this chart was generated using voice recognition Dragon dictation software.  Although every effort was made to ensure the accuracy of this automated transcription, some errors in transcription may have occurred.    
Bilirubin 0.6 0.3 - 1.2 mg/dL    Bilirubin, Direct 0.2 <0.3 mg/dL    Bilirubin, Indirect 0.4 0.0 - 1.0 mg/dL    Total Protein 6.6 6.4 - 8.3 g/dL   Magnesium    Collection Time: 12/21/23  6:28 AM   Result Value Ref Range    Magnesium 1.4 (L) 1.6 - 2.6 mg/dL       Imaging/Diagonstics:  MRI BRAIN WO CONTRAST    Result Date: 12/15/2023  No acute intracranial abnormality. Mild parenchymal volume loss. Mild chronic microvascular disease.       Assessment :      Hospital Problems             Last Modified POA    * (Principal) Quadriplegia, functional (HCC) 12/20/2023 Yes    Hypothyroidism 12/21/2023 Yes    Lumbar radiculopathy, chronic (Chronic) 12/21/2023 Yes    Acute cystitis without hematuria 12/21/2023 Yes       Plan:     63-year-old female was admitted on the acute side and changed out for altered mental status chronic pain and difficulty swallowing and speech saw the patient, improved,  Functional quadriplegia, not able to get out of the bed move around, some chronic back pain,  Weight loss due to poor eating and depression, CT of the abdomen pelvis did not show any malignancy,  Hypokalemia replaced,  Hypomagnesemia replaced,  UTI, treated with Rocephin,  Had elevated troponin on the acute side, cardiology did not do any interventions,  For altered mental status neurology was consulted, MRI of the brain was normal,  Chronic pain syndrome patient was on Norco n methadone, Norco was stopped,  Hyponatremia, running 129, recheck labs,  Thank you for consultation    Consultations:   IP CONSULT TO DIETITIAN  IP CONSULT TO SOCIAL WORK  IP CONSULT TO INTERNAL MEDICINE  IP CONSULT TO PSYCHIATRY      Urvashi Meredith MD  12/21/2023  6:57 PM    Copy sent to Giovanna Nagel MD    Please note that this chart was generated using voice recognition Dragon dictation software.  Although every effort was made to ensure the accuracy of this automated transcription, some errors in transcription may have occurred.

## 2023-12-30 LAB
IRON SATN MFR SERPL: 17 % (ref 20–55)
IRON SERPL-MCNC: 38 UG/DL (ref 37–145)
TIBC SERPL-MCNC: 220 UG/DL (ref 250–450)
UNSATURATED IRON BINDING CAPACITY: 182 UG/DL (ref 112–347)

## 2023-12-30 PROCEDURE — 1180000000 HC REHAB R&B

## 2023-12-30 PROCEDURE — 6370000000 HC RX 637 (ALT 250 FOR IP): Performed by: INTERNAL MEDICINE

## 2023-12-30 PROCEDURE — 6370000000 HC RX 637 (ALT 250 FOR IP): Performed by: PHYSICAL MEDICINE & REHABILITATION

## 2023-12-30 PROCEDURE — 99232 SBSQ HOSP IP/OBS MODERATE 35: CPT | Performed by: STUDENT IN AN ORGANIZED HEALTH CARE EDUCATION/TRAINING PROGRAM

## 2023-12-30 PROCEDURE — 97110 THERAPEUTIC EXERCISES: CPT

## 2023-12-30 PROCEDURE — 6370000000 HC RX 637 (ALT 250 FOR IP): Performed by: STUDENT IN AN ORGANIZED HEALTH CARE EDUCATION/TRAINING PROGRAM

## 2023-12-30 PROCEDURE — 6360000002 HC RX W HCPCS: Performed by: INTERNAL MEDICINE

## 2023-12-30 PROCEDURE — 97535 SELF CARE MNGMENT TRAINING: CPT

## 2023-12-30 PROCEDURE — 83540 ASSAY OF IRON: CPT

## 2023-12-30 PROCEDURE — 92526 ORAL FUNCTION THERAPY: CPT

## 2023-12-30 PROCEDURE — 97530 THERAPEUTIC ACTIVITIES: CPT

## 2023-12-30 PROCEDURE — 97116 GAIT TRAINING THERAPY: CPT

## 2023-12-30 PROCEDURE — 36415 COLL VENOUS BLD VENIPUNCTURE: CPT

## 2023-12-30 PROCEDURE — 83550 IRON BINDING TEST: CPT

## 2023-12-30 PROCEDURE — 6370000000 HC RX 637 (ALT 250 FOR IP): Performed by: PSYCHIATRY & NEUROLOGY

## 2023-12-30 PROCEDURE — 97129 THER IVNTJ 1ST 15 MIN: CPT

## 2023-12-30 RX ADMIN — DILTIAZEM HYDROCHLORIDE 60 MG: 60 TABLET, FILM COATED ORAL at 14:35

## 2023-12-30 RX ADMIN — BUSPIRONE HYDROCHLORIDE 30 MG: 15 TABLET ORAL at 07:38

## 2023-12-30 RX ADMIN — BUSPIRONE HYDROCHLORIDE 30 MG: 15 TABLET ORAL at 20:01

## 2023-12-30 RX ADMIN — DILTIAZEM HYDROCHLORIDE 60 MG: 60 TABLET, FILM COATED ORAL at 05:23

## 2023-12-30 RX ADMIN — ASPIRIN 81 MG: 81 TABLET, COATED ORAL at 07:37

## 2023-12-30 RX ADMIN — CARVEDILOL 3.12 MG: 3.12 TABLET, FILM COATED ORAL at 07:37

## 2023-12-30 RX ADMIN — ATORVASTATIN CALCIUM 40 MG: 40 TABLET, FILM COATED ORAL at 20:01

## 2023-12-30 RX ADMIN — ACETAMINOPHEN 650 MG: 325 TABLET ORAL at 15:45

## 2023-12-30 RX ADMIN — DILTIAZEM HYDROCHLORIDE 60 MG: 60 TABLET, FILM COATED ORAL at 20:02

## 2023-12-30 RX ADMIN — ENOXAPARIN SODIUM 30 MG: 100 INJECTION SUBCUTANEOUS at 07:36

## 2023-12-30 RX ADMIN — METHADONE HYDROCHLORIDE 10 MG: 10 TABLET ORAL at 20:01

## 2023-12-30 RX ADMIN — DOCUSATE SODIUM 100 MG: 100 CAPSULE, LIQUID FILLED ORAL at 20:01

## 2023-12-30 RX ADMIN — QUETIAPINE FUMARATE 12.5 MG: 25 TABLET ORAL at 17:23

## 2023-12-30 RX ADMIN — PANTOPRAZOLE SODIUM 40 MG: 40 TABLET, DELAYED RELEASE ORAL at 07:37

## 2023-12-30 RX ADMIN — THIAMINE HCL TAB 100 MG 100 MG: 100 TAB at 07:37

## 2023-12-30 RX ADMIN — LISINOPRIL 5 MG: 5 TABLET ORAL at 07:37

## 2023-12-30 RX ADMIN — FOLIC ACID 1 MG: 1 TABLET ORAL at 07:38

## 2023-12-30 RX ADMIN — LEVOTHYROXINE SODIUM 100 MCG: 0.1 TABLET ORAL at 05:23

## 2023-12-30 RX ADMIN — METHADONE HYDROCHLORIDE 10 MG: 10 TABLET ORAL at 07:38

## 2023-12-30 RX ADMIN — CARVEDILOL 3.12 MG: 3.12 TABLET, FILM COATED ORAL at 17:23

## 2023-12-30 RX ADMIN — POLYETHYLENE GLYCOL 3350 17 G: 17 POWDER, FOR SOLUTION ORAL at 07:36

## 2023-12-30 ASSESSMENT — PAIN DESCRIPTION - DESCRIPTORS
DESCRIPTORS: DISCOMFORT
DESCRIPTORS: DISCOMFORT

## 2023-12-30 ASSESSMENT — PAIN SCALES - GENERAL
PAINLEVEL_OUTOF10: 8
PAINLEVEL_OUTOF10: 5
PAINLEVEL_OUTOF10: 5
PAINLEVEL_OUTOF10: 7

## 2023-12-30 ASSESSMENT — PAIN SCALES - WONG BAKER
WONGBAKER_NUMERICALRESPONSE: 0

## 2023-12-30 ASSESSMENT — PAIN DESCRIPTION - ORIENTATION
ORIENTATION: MID
ORIENTATION: MID

## 2023-12-30 ASSESSMENT — PAIN DESCRIPTION - LOCATION
LOCATION: THROAT
LOCATION: THROAT
LOCATION: NECK
LOCATION: NECK

## 2023-12-31 PROCEDURE — 97530 THERAPEUTIC ACTIVITIES: CPT

## 2023-12-31 PROCEDURE — 6370000000 HC RX 637 (ALT 250 FOR IP): Performed by: STUDENT IN AN ORGANIZED HEALTH CARE EDUCATION/TRAINING PROGRAM

## 2023-12-31 PROCEDURE — 6360000002 HC RX W HCPCS: Performed by: INTERNAL MEDICINE

## 2023-12-31 PROCEDURE — 97535 SELF CARE MNGMENT TRAINING: CPT

## 2023-12-31 PROCEDURE — 6370000000 HC RX 637 (ALT 250 FOR IP): Performed by: PSYCHIATRY & NEUROLOGY

## 2023-12-31 PROCEDURE — 6370000000 HC RX 637 (ALT 250 FOR IP): Performed by: PHYSICAL MEDICINE & REHABILITATION

## 2023-12-31 PROCEDURE — 6370000000 HC RX 637 (ALT 250 FOR IP): Performed by: INTERNAL MEDICINE

## 2023-12-31 PROCEDURE — 97129 THER IVNTJ 1ST 15 MIN: CPT

## 2023-12-31 PROCEDURE — 1180000000 HC REHAB R&B

## 2023-12-31 PROCEDURE — 6360000002 HC RX W HCPCS: Performed by: NURSE PRACTITIONER

## 2023-12-31 PROCEDURE — 99232 SBSQ HOSP IP/OBS MODERATE 35: CPT | Performed by: STUDENT IN AN ORGANIZED HEALTH CARE EDUCATION/TRAINING PROGRAM

## 2023-12-31 PROCEDURE — 97116 GAIT TRAINING THERAPY: CPT

## 2023-12-31 PROCEDURE — 97110 THERAPEUTIC EXERCISES: CPT

## 2023-12-31 PROCEDURE — 97130 THER IVNTJ EA ADDL 15 MIN: CPT

## 2023-12-31 RX ORDER — CARVEDILOL 3.12 MG/1
3.12 TABLET ORAL 2 TIMES DAILY WITH MEALS
Status: DISCONTINUED | OUTPATIENT
Start: 2023-12-31 | End: 2023-12-31

## 2023-12-31 RX ORDER — CARVEDILOL 6.25 MG/1
6.25 TABLET ORAL 2 TIMES DAILY WITH MEALS
Status: DISCONTINUED | OUTPATIENT
Start: 2024-01-01 | End: 2024-01-02 | Stop reason: HOSPADM

## 2023-12-31 RX ORDER — HYDRALAZINE HYDROCHLORIDE 20 MG/ML
10 INJECTION INTRAMUSCULAR; INTRAVENOUS ONCE
Status: COMPLETED | OUTPATIENT
Start: 2023-12-31 | End: 2023-12-31

## 2023-12-31 RX ORDER — CARVEDILOL 3.12 MG/1
3.12 TABLET ORAL ONCE
Status: COMPLETED | OUTPATIENT
Start: 2023-12-31 | End: 2023-12-31

## 2023-12-31 RX ADMIN — CARVEDILOL 3.12 MG: 3.12 TABLET, FILM COATED ORAL at 15:59

## 2023-12-31 RX ADMIN — BUSPIRONE HYDROCHLORIDE 30 MG: 15 TABLET ORAL at 20:08

## 2023-12-31 RX ADMIN — CARVEDILOL 3.12 MG: 3.12 TABLET, FILM COATED ORAL at 07:49

## 2023-12-31 RX ADMIN — DOCUSATE SODIUM 100 MG: 100 CAPSULE, LIQUID FILLED ORAL at 20:08

## 2023-12-31 RX ADMIN — DOCUSATE SODIUM 100 MG: 100 CAPSULE, LIQUID FILLED ORAL at 07:49

## 2023-12-31 RX ADMIN — DILTIAZEM HYDROCHLORIDE 60 MG: 60 TABLET, FILM COATED ORAL at 05:27

## 2023-12-31 RX ADMIN — HYDRALAZINE HYDROCHLORIDE 10 MG: 20 INJECTION INTRAMUSCULAR; INTRAVENOUS at 20:33

## 2023-12-31 RX ADMIN — ATORVASTATIN CALCIUM 40 MG: 40 TABLET, FILM COATED ORAL at 20:08

## 2023-12-31 RX ADMIN — LEVOTHYROXINE SODIUM 100 MCG: 0.1 TABLET ORAL at 05:27

## 2023-12-31 RX ADMIN — DILTIAZEM HYDROCHLORIDE 60 MG: 60 TABLET, FILM COATED ORAL at 14:24

## 2023-12-31 RX ADMIN — DILTIAZEM HYDROCHLORIDE 60 MG: 60 TABLET, FILM COATED ORAL at 20:25

## 2023-12-31 RX ADMIN — QUETIAPINE FUMARATE 12.5 MG: 25 TABLET ORAL at 20:23

## 2023-12-31 RX ADMIN — PANTOPRAZOLE SODIUM 40 MG: 40 TABLET, DELAYED RELEASE ORAL at 07:48

## 2023-12-31 RX ADMIN — CARVEDILOL 3.12 MG: 3.12 TABLET, FILM COATED ORAL at 16:47

## 2023-12-31 RX ADMIN — FOLIC ACID 1 MG: 1 TABLET ORAL at 07:49

## 2023-12-31 RX ADMIN — THIAMINE HCL TAB 100 MG 100 MG: 100 TAB at 07:49

## 2023-12-31 RX ADMIN — METHADONE HYDROCHLORIDE 10 MG: 10 TABLET ORAL at 20:08

## 2023-12-31 RX ADMIN — BUSPIRONE HYDROCHLORIDE 30 MG: 15 TABLET ORAL at 10:49

## 2023-12-31 RX ADMIN — METHADONE HYDROCHLORIDE 10 MG: 10 TABLET ORAL at 07:48

## 2023-12-31 RX ADMIN — LISINOPRIL 5 MG: 5 TABLET ORAL at 07:49

## 2023-12-31 RX ADMIN — ENOXAPARIN SODIUM 30 MG: 100 INJECTION SUBCUTANEOUS at 07:48

## 2023-12-31 RX ADMIN — ASPIRIN 81 MG: 81 TABLET, COATED ORAL at 07:49

## 2023-12-31 ASSESSMENT — PAIN DESCRIPTION - LOCATION: LOCATION: BACK

## 2023-12-31 ASSESSMENT — PAIN DESCRIPTION - DESCRIPTORS: DESCRIPTORS: ACHING

## 2023-12-31 ASSESSMENT — PAIN DESCRIPTION - ORIENTATION: ORIENTATION: MID

## 2023-12-31 ASSESSMENT — PAIN SCALES - GENERAL: PAINLEVEL_OUTOF10: 7

## 2024-01-01 PROBLEM — I63.9 CEREBROVASCULAR ACCIDENT (HCC): Status: ACTIVE | Noted: 2024-01-01

## 2024-01-01 PROCEDURE — 6370000000 HC RX 637 (ALT 250 FOR IP): Performed by: PHYSICAL MEDICINE & REHABILITATION

## 2024-01-01 PROCEDURE — 6370000000 HC RX 637 (ALT 250 FOR IP): Performed by: INTERNAL MEDICINE

## 2024-01-01 PROCEDURE — 97530 THERAPEUTIC ACTIVITIES: CPT

## 2024-01-01 PROCEDURE — 97110 THERAPEUTIC EXERCISES: CPT

## 2024-01-01 PROCEDURE — 99231 SBSQ HOSP IP/OBS SF/LOW 25: CPT | Performed by: STUDENT IN AN ORGANIZED HEALTH CARE EDUCATION/TRAINING PROGRAM

## 2024-01-01 PROCEDURE — 99232 SBSQ HOSP IP/OBS MODERATE 35: CPT | Performed by: INTERNAL MEDICINE

## 2024-01-01 PROCEDURE — 6370000000 HC RX 637 (ALT 250 FOR IP): Performed by: PSYCHIATRY & NEUROLOGY

## 2024-01-01 PROCEDURE — 6360000002 HC RX W HCPCS: Performed by: INTERNAL MEDICINE

## 2024-01-01 PROCEDURE — 1180000000 HC REHAB R&B

## 2024-01-01 PROCEDURE — 97116 GAIT TRAINING THERAPY: CPT

## 2024-01-01 PROCEDURE — 6370000000 HC RX 637 (ALT 250 FOR IP): Performed by: STUDENT IN AN ORGANIZED HEALTH CARE EDUCATION/TRAINING PROGRAM

## 2024-01-01 PROCEDURE — 97535 SELF CARE MNGMENT TRAINING: CPT

## 2024-01-01 RX ORDER — DILTIAZEM HYDROCHLORIDE 180 MG/1
180 CAPSULE, COATED, EXTENDED RELEASE ORAL DAILY
Qty: 30 CAPSULE | Refills: 0 | Status: SHIPPED | OUTPATIENT
Start: 2024-01-01

## 2024-01-01 RX ORDER — LISINOPRIL 5 MG/1
5 TABLET ORAL DAILY
Qty: 30 TABLET | Refills: 0 | Status: SHIPPED | OUTPATIENT
Start: 2024-01-01

## 2024-01-01 RX ORDER — PSEUDOEPHEDRINE HCL 30 MG
100 TABLET ORAL 2 TIMES DAILY
Qty: 60 CAPSULE | Refills: 0 | Status: SHIPPED | OUTPATIENT
Start: 2024-01-01

## 2024-01-01 RX ORDER — ACETAMINOPHEN 325 MG/1
650 TABLET ORAL EVERY 6 HOURS PRN
COMMUNITY
Start: 2024-01-01

## 2024-01-01 RX ORDER — QUETIAPINE FUMARATE 25 MG/1
12.5 TABLET, FILM COATED ORAL NIGHTLY
Qty: 15 TABLET | Refills: 0 | Status: SHIPPED | OUTPATIENT
Start: 2024-01-02

## 2024-01-01 RX ORDER — ATORVASTATIN CALCIUM 40 MG/1
40 TABLET, FILM COATED ORAL NIGHTLY
Qty: 30 TABLET | Refills: 0 | Status: SHIPPED | OUTPATIENT
Start: 2024-01-01

## 2024-01-01 RX ORDER — PANTOPRAZOLE SODIUM 40 MG/1
40 TABLET, DELAYED RELEASE ORAL DAILY
Qty: 30 TABLET | Refills: 0 | Status: SHIPPED | OUTPATIENT
Start: 2024-01-01

## 2024-01-01 RX ORDER — THIAMINE MONONITRATE (VIT B1) 100 MG
100 TABLET ORAL DAILY
Qty: 30 TABLET | Refills: 0 | Status: SHIPPED | OUTPATIENT
Start: 2024-01-02

## 2024-01-01 RX ORDER — LEVOTHYROXINE SODIUM 0.1 MG/1
100 TABLET ORAL DAILY
Qty: 30 TABLET | Refills: 0 | Status: SHIPPED | OUTPATIENT
Start: 2024-01-01

## 2024-01-01 RX ORDER — CARVEDILOL 6.25 MG/1
6.25 TABLET ORAL 2 TIMES DAILY WITH MEALS
Qty: 60 TABLET | Refills: 0 | Status: SHIPPED | OUTPATIENT
Start: 2024-01-02

## 2024-01-01 RX ORDER — FOLIC ACID 1 MG/1
1 TABLET ORAL DAILY
Qty: 30 TABLET | Refills: 0 | Status: SHIPPED | OUTPATIENT
Start: 2024-01-02

## 2024-01-01 RX ORDER — BUSPIRONE HYDROCHLORIDE 30 MG/1
30 TABLET ORAL 2 TIMES DAILY
Qty: 60 TABLET | Refills: 0 | Status: SHIPPED | OUTPATIENT
Start: 2024-01-01

## 2024-01-01 RX ADMIN — ASPIRIN 81 MG: 81 TABLET, COATED ORAL at 08:09

## 2024-01-01 RX ADMIN — POLYETHYLENE GLYCOL 3350 17 G: 17 POWDER, FOR SOLUTION ORAL at 08:13

## 2024-01-01 RX ADMIN — ENOXAPARIN SODIUM 30 MG: 100 INJECTION SUBCUTANEOUS at 08:13

## 2024-01-01 RX ADMIN — ACETAMINOPHEN 650 MG: 325 TABLET ORAL at 08:09

## 2024-01-01 RX ADMIN — QUETIAPINE FUMARATE 12.5 MG: 25 TABLET ORAL at 18:32

## 2024-01-01 RX ADMIN — LEVOTHYROXINE SODIUM 100 MCG: 0.1 TABLET ORAL at 05:28

## 2024-01-01 RX ADMIN — CARVEDILOL 6.25 MG: 6.25 TABLET, FILM COATED ORAL at 08:09

## 2024-01-01 RX ADMIN — DILTIAZEM HYDROCHLORIDE 60 MG: 60 TABLET, FILM COATED ORAL at 05:27

## 2024-01-01 RX ADMIN — METHADONE HYDROCHLORIDE 10 MG: 10 TABLET ORAL at 08:09

## 2024-01-01 RX ADMIN — ATORVASTATIN CALCIUM 40 MG: 40 TABLET, FILM COATED ORAL at 19:59

## 2024-01-01 RX ADMIN — THIAMINE HCL TAB 100 MG 100 MG: 100 TAB at 08:09

## 2024-01-01 RX ADMIN — LISINOPRIL 5 MG: 5 TABLET ORAL at 08:09

## 2024-01-01 RX ADMIN — DOCUSATE SODIUM 100 MG: 100 CAPSULE, LIQUID FILLED ORAL at 08:09

## 2024-01-01 RX ADMIN — DOCUSATE SODIUM 100 MG: 100 CAPSULE, LIQUID FILLED ORAL at 19:59

## 2024-01-01 RX ADMIN — DILTIAZEM HYDROCHLORIDE 60 MG: 60 TABLET, FILM COATED ORAL at 20:09

## 2024-01-01 RX ADMIN — CARVEDILOL 6.25 MG: 6.25 TABLET, FILM COATED ORAL at 18:32

## 2024-01-01 RX ADMIN — BUSPIRONE HYDROCHLORIDE 30 MG: 15 TABLET ORAL at 19:59

## 2024-01-01 RX ADMIN — BUSPIRONE HYDROCHLORIDE 30 MG: 15 TABLET ORAL at 08:09

## 2024-01-01 RX ADMIN — PANTOPRAZOLE SODIUM 40 MG: 40 TABLET, DELAYED RELEASE ORAL at 08:09

## 2024-01-01 RX ADMIN — DILTIAZEM HYDROCHLORIDE 60 MG: 60 TABLET, FILM COATED ORAL at 15:11

## 2024-01-01 RX ADMIN — METHADONE HYDROCHLORIDE 10 MG: 10 TABLET ORAL at 19:59

## 2024-01-01 RX ADMIN — FOLIC ACID 1 MG: 1 TABLET ORAL at 08:09

## 2024-01-01 ASSESSMENT — PAIN DESCRIPTION - LOCATION
LOCATION: BACK
LOCATION: NECK

## 2024-01-01 ASSESSMENT — PAIN DESCRIPTION - ORIENTATION: ORIENTATION: LOWER

## 2024-01-01 ASSESSMENT — PAIN SCALES - GENERAL
PAINLEVEL_OUTOF10: 2
PAINLEVEL_OUTOF10: 7
PAINLEVEL_OUTOF10: 3

## 2024-01-02 VITALS
BODY MASS INDEX: 20.52 KG/M2 | HEART RATE: 72 BPM | HEIGHT: 61 IN | DIASTOLIC BLOOD PRESSURE: 66 MMHG | SYSTOLIC BLOOD PRESSURE: 111 MMHG | TEMPERATURE: 98.7 F | OXYGEN SATURATION: 94 % | RESPIRATION RATE: 18 BRPM | WEIGHT: 108.7 LBS

## 2024-01-02 PROCEDURE — 6370000000 HC RX 637 (ALT 250 FOR IP): Performed by: INTERNAL MEDICINE

## 2024-01-02 PROCEDURE — 6360000002 HC RX W HCPCS: Performed by: INTERNAL MEDICINE

## 2024-01-02 PROCEDURE — 97129 THER IVNTJ 1ST 15 MIN: CPT

## 2024-01-02 PROCEDURE — 6370000000 HC RX 637 (ALT 250 FOR IP): Performed by: PSYCHIATRY & NEUROLOGY

## 2024-01-02 PROCEDURE — 6370000000 HC RX 637 (ALT 250 FOR IP): Performed by: STUDENT IN AN ORGANIZED HEALTH CARE EDUCATION/TRAINING PROGRAM

## 2024-01-02 PROCEDURE — 6370000000 HC RX 637 (ALT 250 FOR IP): Performed by: PHYSICAL MEDICINE & REHABILITATION

## 2024-01-02 PROCEDURE — 99239 HOSP IP/OBS DSCHRG MGMT >30: CPT | Performed by: PHYSICAL MEDICINE & REHABILITATION

## 2024-01-02 PROCEDURE — 97110 THERAPEUTIC EXERCISES: CPT

## 2024-01-02 PROCEDURE — 97116 GAIT TRAINING THERAPY: CPT

## 2024-01-02 PROCEDURE — 97535 SELF CARE MNGMENT TRAINING: CPT

## 2024-01-02 PROCEDURE — 97530 THERAPEUTIC ACTIVITIES: CPT

## 2024-01-02 PROCEDURE — 97130 THER IVNTJ EA ADDL 15 MIN: CPT

## 2024-01-02 RX ADMIN — PANTOPRAZOLE SODIUM 40 MG: 40 TABLET, DELAYED RELEASE ORAL at 07:51

## 2024-01-02 RX ADMIN — CARVEDILOL 6.25 MG: 6.25 TABLET, FILM COATED ORAL at 07:51

## 2024-01-02 RX ADMIN — THIAMINE HCL TAB 100 MG 100 MG: 100 TAB at 07:50

## 2024-01-02 RX ADMIN — POLYETHYLENE GLYCOL 3350 17 G: 17 POWDER, FOR SOLUTION ORAL at 07:50

## 2024-01-02 RX ADMIN — ASPIRIN 81 MG: 81 TABLET, COATED ORAL at 07:50

## 2024-01-02 RX ADMIN — LEVOTHYROXINE SODIUM 100 MCG: 0.1 TABLET ORAL at 05:28

## 2024-01-02 RX ADMIN — DOCUSATE SODIUM 100 MG: 100 CAPSULE, LIQUID FILLED ORAL at 07:50

## 2024-01-02 RX ADMIN — BUSPIRONE HYDROCHLORIDE 30 MG: 15 TABLET ORAL at 07:50

## 2024-01-02 RX ADMIN — METHADONE HYDROCHLORIDE 10 MG: 10 TABLET ORAL at 07:50

## 2024-01-02 RX ADMIN — FOLIC ACID 1 MG: 1 TABLET ORAL at 07:50

## 2024-01-02 RX ADMIN — ENOXAPARIN SODIUM 30 MG: 100 INJECTION SUBCUTANEOUS at 07:50

## 2024-01-02 RX ADMIN — DILTIAZEM HYDROCHLORIDE 60 MG: 60 TABLET, FILM COATED ORAL at 05:27

## 2024-01-02 RX ADMIN — LISINOPRIL 5 MG: 5 TABLET ORAL at 07:50

## 2024-01-02 NOTE — PATIENT CARE CONFERENCE
[facility] Acute Inpatient Rehabilitation  TEAM CONFERENCE NOTE  Date: 24  Patient Name: Rajani Iglesias       Room: 2626/2626-01  MRN: [id number]       : 1960  (63 y.o.)     Gender: female     Referring Practitioner: Dr. Leeann Lynn     PRINCIPAL DIAGNOSIS: Tetraparesis (HCC)    NURSING    Bladder Continence  Always Continent  Bowel Continence Always Continent    Date of Last BM: 2024    Bladder/Bowel Program Interventions: Both Bowel & Bladder Program In Place     Wounds/Incisions/Ulcers: No skin issues identified    Pain Control: Patient's pain currently controlled and pain regimen effective as ordered    Pain Medication Regimen Usage Pattern: MAR reviewed and pain medications are being used at the following frequency (Specify Medication, # Doses Administered on average per day, identified patterns of use - for example: time of day, prior to activity/therapy)  650mg Tylenol Q4 PRN, taking once daily. 10mg Methadone  BID.    Fall Risk:  Falling star program initiated    Medication Education Program: Patient currently unable to manage medications and responsible party being educated    Discharge Preparation Patient/Responsible Party Education In Progress:   No Educational Needs Identified    Nursing specific communication for TEAM: No additional information identified requiring communication at this time    PHYSICAL THERAPY  Bed mobility  Rolling to Left: Modified independent  Rolling to Right: Modified independent  Supine to Sit: Modified independent  Sit to Supine: Modified independent  Scooting: Modified independent    Transfers:  Sit to Stand: Modified independent  Stand to Sit: Modified independent  Bed to Chair: Modified independent    Ambulation  Surface: Level tile  Device: Rolling Walker  Assistance: Modified Independent  Quality of Gait: patient demonstrating good posture, with forward head d/t degenerative changes.  noted  trunk and head rotation during gait  Gait

## 2024-01-02 NOTE — CARE COORDINATION
[facility] Acute Inpatient Rehabilitation  Case Management Discharge Note    Discharge Disposition: Home with Spouse    Discharge Transportation Method: Car ,  Time: 1 pm    IMM Letter Status: Patient/Responsible Party agreeable with discharge: Second Signature Obtained, Patient/Responsible Party offered four hours to make informed decision regarding appeal process - Completed Letter Placed in Patient Chart    Home Healthcare Services: Not Applicable    Outpatient Therapy Services: Location: [facility]    DME:  None ordered.    Current reconciled medication list sent to subsequent provider via: Electronic Health Record      Patient Health Questionnaire-9 (PHQ-2 to 9)   Over the last 2 weeks, how often have you been bothered by any of the following problems?    1. Little Interest or pleasure in doing things?   Never or 1 Day - Score 0    2. Feeling down, depressed or hopeless?   Never or 1 Day - Score 0    3. Trouble falling or staying asleep, or sleeping too much?   Never or 1 Day - Score 0    4. Feeling tired or having little energy?   Never or 1 Day - Score 0    5. Poor appetite or overeating?   Never or 1 Day - Score 0    6. Feeling bad about yourself-or that you are a failure or have let yourself or your family down?   Never or 1 Day - Score 0    7. Trouble concentrating on things, such as reading the newspaper or watching television?    Never or 1 Day - Score 0    8. Moving or speaking so slowly that other people could have noticed? Or the opposite-being so fidgety or restless that you have been moving around a lot more than usual?  Never or 1 Day - Score 0    9. Thoughts that you would be better off dead or of hurting yourself in some way?   Never or 1 Day - Score 0    Total Score: 0  If score is above 15, Notify PM&R Physician    If you checked off any problems, how difficult have these problems made it for you to do your work, take care of things at home, or get along with 
ARU CASE MANAGEMENT NOTE:    Patient is alert and oriented x4.    Spoke with patient and daughter regarding discharge plan: Return home with spouse and would like to have OP therapy at [facility]. Family is able to provide 24/7 supervision at home.    Outside appointments while in ARU: None    Will continue to follow for additional discharge needs.      Electronically signed by Justin Roth RN on 12/29/2023 at 12:30 PM   
ARU CASE MANAGEMENT NOTE:    Patient is alert and oriented x4.    Spoke with patient, daughter, and spouse regarding discharge plan: Return home with spouse and would like to have OP therapy at [facility]. Daughter states patient will have 24 hour supervision at home.    Outside appointments while in ARU: None    Will continue to follow for additional discharge needs.      Electronically signed by Justin Roth RN on 12/28/2023 at 11:20 AM   
wheelchair, Cane, Reacher, Lift chair, Toilet raiser, Tub transfer bench./ Placard: Has./ PHQ9: 0./ F/U Appts in ARU: None./ Pharmacy: [facility] #[id number] ([place], [place]).       Justin Roth RN  Acute Inpatient Rehabilitation Case Management Department  Ph:[phone] Fax: [phone]    Smart Phrase Template Revision: 08/23/2023

## 2024-01-02 NOTE — PLAN OF CARE
Problem: Discharge Planning  Goal: Discharge to home or other facility with appropriate resources  1/1/2024 0050 by Celena Saavedra RN  Outcome: Progressing     Problem: Safety - Adult  Goal: Free from fall injury  1/1/2024 0050 by Celena Saavedra RN  Outcome: Progressing     Problem: Skin/Tissue Integrity  Goal: Absence of new skin breakdown  Description: 1.  Monitor for areas of redness and/or skin breakdown  2.  Assess vascular access sites hourly  3.  Every 4-6 hours minimum:  Change oxygen saturation probe site  4.  Every 4-6 hours:  If on nasal continuous positive airway pressure, respiratory therapy assess nares and determine need for appliance change or resting period.  Outcome: Progressing     Problem: ABCDS Injury Assessment  Goal: Absence of physical injury  1/1/2024 0050 by Celena Saavedra RN  Outcome: Progressing     Problem: Pain  Goal: Verbalizes/displays adequate comfort level or baseline comfort level  1/1/2024 0050 by Celena Saavedra RN  Outcome: Progressing     
  Problem: Discharge Planning  Goal: Discharge to home or other facility with appropriate resources  1/2/2024 0320 by Rossy Bruce, RN  Outcome: Progressing  Flowsheets (Taken 1/1/2024 1956)  Discharge to home or other facility with appropriate resources: Identify barriers to discharge with patient and caregiver     Problem: Safety - Adult  Goal: Free from fall injury  1/2/2024 0320 by Rossy Bruce, RN  Outcome: Progressing  Flowsheets (Taken 1/1/2024 2233)  Free From Fall Injury: Instruct family/caregiver on patient safety     Problem: Confusion  Goal: Confusion, delirium, dementia, or psychosis is improved or at baseline  Description: INTERVENTIONS:  1. Assess for possible contributors to thought disturbance, including medications, impaired vision or hearing, underlying metabolic abnormalities, dehydration, psychiatric diagnoses, and notify attending LIP  2. [place] high risk fall precautions, as indicated  3. Provide frequent short contacts to provide reality reorientation, refocusing and direction  4. Decrease environmental stimuli, including noise as appropriate  5. Monitor and intervene to maintain adequate nutrition, hydration, elimination, sleep and activity  6. If unable to ensure safety without constant attention obtain sitter and review sitter guidelines with assigned personnel  7. Initiate Psychosocial CNS and Spiritual Care consult, as indicated  1/2/2024 0320 by Rossy Bruce, RN  Outcome: Progressing  Flowsheets (Taken 12/25/2023 0900 by Genoveva Pollack, RN)  Effect of thought disturbance (confusion, delirium, dementia, or psychosis) are managed with adequate functional status: Assess for contributors to thought disturbance, including medications, impaired vision or hearing, underlying metabolic abnormalities, dehydration, psychiatric diagnoses, notify LIP     
  Problem: Discharge Planning  Goal: Discharge to home or other facility with appropriate resources  1/2/2024 1123 by Shira Hernandez LPN  Outcome: Completed     Problem: Safety - Adult  Goal: Free from fall injury  1/2/2024 1123 by Shira Hernandez LPN  Outcome: Completed     Problem: Skin/Tissue Integrity  Goal: Absence of new skin breakdown  Description: 1.  Monitor for areas of redness and/or skin breakdown  2.  Assess vascular access sites hourly  3.  Every 4-6 hours minimum:  Change oxygen saturation probe site  4.  Every 4-6 hours:  If on nasal continuous positive airway pressure, respiratory therapy assess nares and determine need for appliance change or resting period.  Outcome: Completed     Problem: ABCDS Injury Assessment  Goal: Absence of physical injury  Outcome: Completed  Flowsheets (Taken 1/1/2024 2233 by Rossy Bruce, RN)  Absence of Physical Injury: Implement safety measures based on patient assessment     Problem: Confusion  Goal: Confusion, delirium, dementia, or psychosis is improved or at baseline  Description: INTERVENTIONS:  1. Assess for possible contributors to thought disturbance, including medications, impaired vision or hearing, underlying metabolic abnormalities, dehydration, psychiatric diagnoses, and notify attending LIP  2. [place] high risk fall precautions, as indicated  3. Provide frequent short contacts to provide reality reorientation, refocusing and direction  4. Decrease environmental stimuli, including noise as appropriate  5. Monitor and intervene to maintain adequate nutrition, hydration, elimination, sleep and activity  6. If unable to ensure safety without constant attention obtain sitter and review sitter guidelines with assigned personnel  7. Initiate Psychosocial CNS and Spiritual Care consult, as indicated  1/2/2024 1123 by Shira Hernandez LPN  Outcome: Completed     Problem: Pain  Goal: Verbalizes/displays adequate comfort level or baseline comfort 
  Problem: Discharge Planning  Goal: Discharge to home or other facility with appropriate resources  12/21/2023 0504 by Carmen Krishna, RN  Outcome: Progressing  12/21/2023 0502 by Carmen Krishna, RN  Outcome: Progressing     Problem: Safety - Adult  Goal: Free from fall injury  Outcome: Progressing     Problem: Skin/Tissue Integrity  Goal: Absence of new skin breakdown  Description: 1.  Monitor for areas of redness and/or skin breakdown  2.  Assess vascular access sites hourly  3.  Every 4-6 hours minimum:  Change oxygen saturation probe site  4.  Every 4-6 hours:  If on nasal continuous positive airway pressure, respiratory therapy assess nares and determine need for appliance change or resting period.  Outcome: Progressing     Problem: ABCDS Injury Assessment  Goal: Absence of physical injury  Outcome: Progressing     Problem: Pain  Goal: Verbalizes/displays adequate comfort level or baseline comfort level  Outcome: Progressing     
  Problem: Discharge Planning  Goal: Discharge to home or other facility with appropriate resources  12/21/2023 1002 by Zeinab Mayo LPN  Outcome: Progressing     Problem: Safety - Adult  Goal: Free from fall injury  12/21/2023 1002 by Zeinab Mayo LPN  Outcome: Progressing     Problem: Skin/Tissue Integrity  Goal: Absence of new skin breakdown  Description: 1.  Monitor for areas of redness and/or skin breakdown  2.  Assess vascular access sites hourly  3.  Every 4-6 hours minimum:  Change oxygen saturation probe site  4.  Every 4-6 hours:  If on nasal continuous positive airway pressure, respiratory therapy assess nares and determine need for appliance change or resting period.  12/21/2023 1002 by Zeinab Mayo LPN  Outcome: Progressing     Problem: ABCDS Injury Assessment  Goal: Absence of physical injury  12/21/2023 1002 by Zeinab Mayo LPN  Outcome: Progressing     Problem: Confusion  Goal: Confusion, delirium, dementia, or psychosis is improved or at baseline  Description: INTERVENTIONS:  1. Assess for possible contributors to thought disturbance, including medications, impaired vision or hearing, underlying metabolic abnormalities, dehydration, psychiatric diagnoses, and notify attending LIP  2. [place] high risk fall precautions, as indicated  3. Provide frequent short contacts to provide reality reorientation, refocusing and direction  4. Decrease environmental stimuli, including noise as appropriate  5. Monitor and intervene to maintain adequate nutrition, hydration, elimination, sleep and activity  6. If unable to ensure safety without constant attention obtain sitter and review sitter guidelines with assigned personnel  7. Initiate Psychosocial CNS and Spiritual Care consult, as indicated  Outcome: Progressing     Problem: Pain  Goal: Verbalizes/displays adequate comfort level or baseline comfort level  12/21/2023 1002 by Zeinab Mayo LPN  Outcome: Progressing     
  Problem: Discharge Planning  Goal: Discharge to home or other facility with appropriate resources  12/22/2023 1039 by Zeinab Mayo LPN  Outcome: Progressing     Problem: Safety - Adult  Goal: Free from fall injury  12/22/2023 1039 by Zeinab Mayo LPN  Outcome: Progressing     Problem: Skin/Tissue Integrity  Goal: Absence of new skin breakdown  Description: 1.  Monitor for areas of redness and/or skin breakdown  2.  Assess vascular access sites hourly  3.  Every 4-6 hours minimum:  Change oxygen saturation probe site  4.  Every 4-6 hours:  If on nasal continuous positive airway pressure, respiratory therapy assess nares and determine need for appliance change or resting period.  12/22/2023 1039 by Zeinab Mayo LPN  Outcome: Progressing     Problem: ABCDS Injury Assessment  Goal: Absence of physical injury  12/22/2023 1039 by Zeinab Mayo LPN  Outcome: Progressing     Problem: Confusion  Goal: Confusion, delirium, dementia, or psychosis is improved or at baseline  Description: INTERVENTIONS:  1. Assess for possible contributors to thought disturbance, including medications, impaired vision or hearing, underlying metabolic abnormalities, dehydration, psychiatric diagnoses, and notify attending LIP  2. [place] high risk fall precautions, as indicated  3. Provide frequent short contacts to provide reality reorientation, refocusing and direction  4. Decrease environmental stimuli, including noise as appropriate  5. Monitor and intervene to maintain adequate nutrition, hydration, elimination, sleep and activity  6. If unable to ensure safety without constant attention obtain sitter and review sitter guidelines with assigned personnel  7. Initiate Psychosocial CNS and Spiritual Care consult, as indicated  12/22/2023 1039 by Zeinab Mayo LPN  Outcome: Progressing     Problem: Pain  Goal: Verbalizes/displays adequate comfort level or baseline comfort level  12/22/2023 1039 by Gael 
  Problem: Discharge Planning  Goal: Discharge to home or other facility with appropriate resources  12/22/2023 2222 by Rayna Yuan RN  Outcome: Progressing  12/22/2023 1039 by Zeinab Mayo LPN  Outcome: Progressing     Problem: Safety - Adult  Goal: Free from fall injury  12/22/2023 2222 by Rayna Yuan RN  Outcome: Progressing  12/22/2023 1039 by Zeinab Mayo LPN  Outcome: Progressing     Problem: Skin/Tissue Integrity  Goal: Absence of new skin breakdown  Description: 1.  Monitor for areas of redness and/or skin breakdown  2.  Assess vascular access sites hourly  3.  Every 4-6 hours minimum:  Change oxygen saturation probe site  4.  Every 4-6 hours:  If on nasal continuous positive airway pressure, respiratory therapy assess nares and determine need for appliance change or resting period.  12/22/2023 2222 by Rayna Yuan RN  Outcome: Progressing  12/22/2023 1039 by Zeinab Mayo LPN  Outcome: Progressing     Problem: ABCDS Injury Assessment  Goal: Absence of physical injury  12/22/2023 2222 by Rayna Yuan RN  Outcome: Progressing  12/22/2023 1039 by Zeinab Mayo LPN  Outcome: Progressing     Problem: Confusion  Goal: Confusion, delirium, dementia, or psychosis is improved or at baseline  Description: INTERVENTIONS:  1. Assess for possible contributors to thought disturbance, including medications, impaired vision or hearing, underlying metabolic abnormalities, dehydration, psychiatric diagnoses, and notify attending LIP  2. [place] high risk fall precautions, as indicated  3. Provide frequent short contacts to provide reality reorientation, refocusing and direction  4. Decrease environmental stimuli, including noise as appropriate  5. Monitor and intervene to maintain adequate nutrition, hydration, elimination, sleep and activity  6. If unable to ensure safety without constant attention obtain sitter and review sitter guidelines with assigned personnel  7. Initiate 
  Problem: Discharge Planning  Goal: Discharge to home or other facility with appropriate resources  12/24/2023 1416 by Jazlyn Hansen II, LPN  Flowsheets (Taken 12/24/2023 0406 by Judy Riley, RN)  Discharge to home or other facility with appropriate resources: Identify barriers to discharge with patient and caregiver     Problem: Safety - Adult  Goal: Free from fall injury  12/24/2023 1416 by Jazlyn Hansen II, LPN  Flowsheets (Taken 12/24/2023 0406 by Judy Riley, RN)  Free From Fall Injury: Instruct family/caregiver on patient safety     Problem: Skin/Tissue Integrity  Goal: Absence of new skin breakdown  Description: 1.  Monitor for areas of redness and/or skin breakdown  2.  Assess vascular access sites hourly  3.  Every 4-6 hours minimum:  Change oxygen saturation probe site  4.  Every 4-6 hours:  If on nasal continuous positive airway pressure, respiratory therapy assess nares and determine need for appliance change or resting period.  12/24/2023 0406 by Judy Riley, RN  Outcome: Progressing     Problem: ABCDS Injury Assessment  Goal: Absence of physical injury  12/24/2023 1416 by Jazlyn Hansen II, LPN  Flowsheets (Taken 12/24/2023 1416)  Absence of Physical Injury: Implement safety measures based on patient assessment     Problem: Confusion  Goal: Confusion, delirium, dementia, or psychosis is improved or at baseline  Description: INTERVENTIONS:  1. Assess for possible contributors to thought disturbance, including medications, impaired vision or hearing, underlying metabolic abnormalities, dehydration, psychiatric diagnoses, and notify attending LIP  2. [place] high risk fall precautions, as indicated  3. Provide frequent short contacts to provide reality reorientation, refocusing and direction  4. Decrease environmental stimuli, including noise as appropriate  5. Monitor and intervene to maintain adequate nutrition, hydration, elimination, sleep and activity  6. If unable to ensure safety 
  Problem: Discharge Planning  Goal: Discharge to home or other facility with appropriate resources  12/27/2023 0203 by Zuleyma Fuller LPN  Outcome: Progressing     Problem: Safety - Adult  Goal: Free from fall injury  12/27/2023 0203 by Zuleyma Fuller LPN  Outcome: Progressing     Problem: Skin/Tissue Integrity  Goal: Absence of new skin breakdown  Description: 1.  Monitor for areas of redness and/or skin breakdown  2.  Assess vascular access sites hourly  3.  Every 4-6 hours minimum:  Change oxygen saturation probe site  4.  Every 4-6 hours:  If on nasal continuous positive airway pressure, respiratory therapy assess nares and determine need for appliance change or resting period.  Outcome: Progressing     Problem: ABCDS Injury Assessment  Goal: Absence of physical injury  12/27/2023 0203 by Zuleyma Fuller LPN  Outcome: Progressing     Problem: Confusion  Goal: Confusion, delirium, dementia, or psychosis is improved or at baseline  Description: INTERVENTIONS:  1. Assess for possible contributors to thought disturbance, including medications, impaired vision or hearing, underlying metabolic abnormalities, dehydration, psychiatric diagnoses, and notify attending LIP  2. [place] high risk fall precautions, as indicated  3. Provide frequent short contacts to provide reality reorientation, refocusing and direction  4. Decrease environmental stimuli, including noise as appropriate  5. Monitor and intervene to maintain adequate nutrition, hydration, elimination, sleep and activity  6. If unable to ensure safety without constant attention obtain sitter and review sitter guidelines with assigned personnel  7. Initiate Psychosocial CNS and Spiritual Care consult, as indicated  12/27/2023 0203 by Zuleyma Fuller LPN  Outcome: Progressing     Problem: Pain  Goal: Verbalizes/displays adequate comfort level or baseline comfort level  Outcome: Progressing     Problem: Nutrition Deficit:  Goal: Optimize nutritional 
  Problem: Discharge Planning  Goal: Discharge to home or other facility with appropriate resources  12/27/2023 1431 by Jazlyn Hansen II, LPN  Flowsheets (Taken 12/27/2023 0741)  Discharge to home or other facility with appropriate resources: Identify barriers to discharge with patient and caregiver     Problem: Safety - Adult  Goal: Free from fall injury  12/27/2023 1431 by Jazlyn Hansen II, LPN  Flowsheets (Taken 12/24/2023 0406 by Judy Riley, RN)  Free From Fall Injury: Instruct family/caregiver on patient safety     Problem: Skin/Tissue Integrity  Goal: Absence of new skin breakdown  Description: 1.  Monitor for areas of redness and/or skin breakdown  2.  Assess vascular access sites hourly  3.  Every 4-6 hours minimum:  Change oxygen saturation probe site  4.  Every 4-6 hours:  If on nasal continuous positive airway pressure, respiratory therapy assess nares and determine need for appliance change or resting period.  12/27/2023 0203 by Zuleyma Fuller LPN  Outcome: Progressing     Problem: ABCDS Injury Assessment  Goal: Absence of physical injury  12/27/2023 1431 by Jazlyn Hansen II, LPN  Flowsheets (Taken 12/24/2023 1416)  Absence of Physical Injury: Implement safety measures based on patient assessment     Problem: Confusion  Goal: Confusion, delirium, dementia, or psychosis is improved or at baseline  Description: INTERVENTIONS:  1. Assess for possible contributors to thought disturbance, including medications, impaired vision or hearing, underlying metabolic abnormalities, dehydration, psychiatric diagnoses, and notify attending LIP  2. [place] high risk fall precautions, as indicated  3. Provide frequent short contacts to provide reality reorientation, refocusing and direction  4. Decrease environmental stimuli, including noise as appropriate  5. Monitor and intervene to maintain adequate nutrition, hydration, elimination, sleep and activity  6. If unable to ensure safety without constant attention 
  Problem: Discharge Planning  Goal: Discharge to home or other facility with appropriate resources  Flowsheets (Taken 12/25/2023 0900 by Genoveva Pollack, RN)  Discharge to home or other facility with appropriate resources:   Identify barriers to discharge with patient and caregiver   Arrange for needed discharge resources and transportation as appropriate     Problem: Safety - Adult  Goal: Free from fall injury  Flowsheets (Taken 12/24/2023 0406 by Judy Riley, RN)  Free From Fall Injury: Instruct family/caregiver on patient safety     Problem: ABCDS Injury Assessment  Goal: Absence of physical injury  Flowsheets (Taken 12/24/2023 1416)  Absence of Physical Injury: Implement safety measures based on patient assessment     Problem: Confusion  Goal: Confusion, delirium, dementia, or psychosis is improved or at baseline  Description: INTERVENTIONS:  1. Assess for possible contributors to thought disturbance, including medications, impaired vision or hearing, underlying metabolic abnormalities, dehydration, psychiatric diagnoses, and notify attending LIP  2. [place] high risk fall precautions, as indicated  3. Provide frequent short contacts to provide reality reorientation, refocusing and direction  4. Decrease environmental stimuli, including noise as appropriate  5. Monitor and intervene to maintain adequate nutrition, hydration, elimination, sleep and activity  6. If unable to ensure safety without constant attention obtain sitter and review sitter guidelines with assigned personnel  7. Initiate Psychosocial CNS and Spiritual Care consult, as indicated  Flowsheets (Taken 12/25/2023 0900 by Genoveva Pollack, RN)  Effect of thought disturbance (confusion, delirium, dementia, or psychosis) are managed with adequate functional status: Assess for contributors to thought disturbance, including medications, impaired vision or hearing, underlying metabolic abnormalities, dehydration, psychiatric diagnoses, notify LIP   
  Problem: Discharge Planning  Goal: Discharge to home or other facility with appropriate resources  Outcome: Progressing     Problem: Safety - Adult  Goal: Free from fall injury  12/30/2023 1447 by Bebo Ortiz RN  Outcome: Progressing     Problem: Skin/Tissue Integrity  Goal: Absence of new skin breakdown  Description: 1.  Monitor for areas of redness and/or skin breakdown  2.  Assess vascular access sites hourly  3.  Every 4-6 hours minimum:  Change oxygen saturation probe site  4.  Every 4-6 hours:  If on nasal continuous positive airway pressure, respiratory therapy assess nares and determine need for appliance change or resting period.  12/30/2023 1447 by Bebo Ortiz RN  Outcome: Progressing     Problem: ABCDS Injury Assessment  Goal: Absence of physical injury  12/30/2023 1447 by Bebo Ortiz RN  Outcome: Progressing     Problem: Confusion  Goal: Confusion, delirium, dementia, or psychosis is improved or at baseline  Description: INTERVENTIONS:  1. Assess for possible contributors to thought disturbance, including medications, impaired vision or hearing, underlying metabolic abnormalities, dehydration, psychiatric diagnoses, and notify attending LIP  2. [place] high risk fall precautions, as indicated  3. Provide frequent short contacts to provide reality reorientation, refocusing and direction  4. Decrease environmental stimuli, including noise as appropriate  5. Monitor and intervene to maintain adequate nutrition, hydration, elimination, sleep and activity  6. If unable to ensure safety without constant attention obtain sitter and review sitter guidelines with assigned personnel  7. Initiate Psychosocial CNS and Spiritual Care consult, as indicated  Outcome: Progressing     Problem: Pain  Goal: Verbalizes/displays adequate comfort level or baseline comfort level  Outcome: Progressing     Problem: Nutrition Deficit:  Goal: Optimize nutritional status  Outcome: Progressing     
  Problem: Discharge Planning  Goal: Discharge to home or other facility with appropriate resources  Outcome: Progressing     Problem: Safety - Adult  Goal: Free from fall injury  Outcome: Progressing     Problem: Skin/Tissue Integrity  Goal: Absence of new skin breakdown  Description: 1.  Monitor for areas of redness and/or skin breakdown  2.  Assess vascular access sites hourly  3.  Every 4-6 hours minimum:  Change oxygen saturation probe site  4.  Every 4-6 hours:  If on nasal continuous positive airway pressure, respiratory therapy assess nares and determine need for appliance change or resting period.  Outcome: Progressing     Problem: ABCDS Injury Assessment  Goal: Absence of physical injury  Outcome: Progressing     Problem: Confusion  Goal: Confusion, delirium, dementia, or psychosis is improved or at baseline  Description: INTERVENTIONS:  1. Assess for possible contributors to thought disturbance, including medications, impaired vision or hearing, underlying metabolic abnormalities, dehydration, psychiatric diagnoses, and notify attending LIP  2. [place] high risk fall precautions, as indicated  3. Provide frequent short contacts to provide reality reorientation, refocusing and direction  4. Decrease environmental stimuli, including noise as appropriate  5. Monitor and intervene to maintain adequate nutrition, hydration, elimination, sleep and activity  6. If unable to ensure safety without constant attention obtain sitter and review sitter guidelines with assigned personnel  7. Initiate Psychosocial CNS and Spiritual Care consult, as indicated  Outcome: Progressing     
  Problem: Discharge Planning  Goal: Discharge to home or other facility with appropriate resources  Outcome: Progressing     Problem: Safety - Adult  Goal: Free from fall injury  Outcome: Progressing     Problem: Skin/Tissue Integrity  Goal: Absence of new skin breakdown  Description: 1.  Monitor for areas of redness and/or skin breakdown  2.  Assess vascular access sites hourly  3.  Every 4-6 hours minimum:  Change oxygen saturation probe site  4.  Every 4-6 hours:  If on nasal continuous positive airway pressure, respiratory therapy assess nares and determine need for appliance change or resting period.  Outcome: Progressing     Problem: ABCDS Injury Assessment  Goal: Absence of physical injury  Outcome: Progressing     Problem: Confusion  Goal: Confusion, delirium, dementia, or psychosis is improved or at baseline  Description: INTERVENTIONS:  1. Assess for possible contributors to thought disturbance, including medications, impaired vision or hearing, underlying metabolic abnormalities, dehydration, psychiatric diagnoses, and notify attending LIP  2. [place] high risk fall precautions, as indicated  3. Provide frequent short contacts to provide reality reorientation, refocusing and direction  4. Decrease environmental stimuli, including noise as appropriate  5. Monitor and intervene to maintain adequate nutrition, hydration, elimination, sleep and activity  6. If unable to ensure safety without constant attention obtain sitter and review sitter guidelines with assigned personnel  7. Initiate Psychosocial CNS and Spiritual Care consult, as indicated  Outcome: Progressing     Problem: Pain  Goal: Verbalizes/displays adequate comfort level or baseline comfort level  Outcome: Progressing     Problem: Nutrition Deficit:  Goal: Optimize nutritional status  Outcome: Progressing     
  Problem: Discharge Planning  Goal: Discharge to home or other facility with appropriate resources  Outcome: Progressing  Flowsheets (Taken 12/24/2023 0406)  Discharge to home or other facility with appropriate resources: Identify barriers to discharge with patient and caregiver     Problem: Safety - Adult  Goal: Free from fall injury  Outcome: Progressing  Flowsheets (Taken 12/24/2023 0406)  Free From Fall Injury: Instruct family/caregiver on patient safety     Problem: Skin/Tissue Integrity  Goal: Absence of new skin breakdown  Description: 1.  Monitor for areas of redness and/or skin breakdown  2.  Assess vascular access sites hourly  3.  Every 4-6 hours minimum:  Change oxygen saturation probe site  4.  Every 4-6 hours:  If on nasal continuous positive airway pressure, respiratory therapy assess nares and determine need for appliance change or resting period.  Outcome: Progressing     Problem: ABCDS Injury Assessment  Goal: Absence of physical injury  Outcome: Progressing     Problem: Confusion  Goal: Confusion, delirium, dementia, or psychosis is improved or at baseline  Description: INTERVENTIONS:  1. Assess for possible contributors to thought disturbance, including medications, impaired vision or hearing, underlying metabolic abnormalities, dehydration, psychiatric diagnoses, and notify attending LIP  2. [place] high risk fall precautions, as indicated  3. Provide frequent short contacts to provide reality reorientation, refocusing and direction  4. Decrease environmental stimuli, including noise as appropriate  5. Monitor and intervene to maintain adequate nutrition, hydration, elimination, sleep and activity  6. If unable to ensure safety without constant attention obtain sitter and review sitter guidelines with assigned personnel  7. Initiate Psychosocial CNS and Spiritual Care consult, as indicated  Outcome: Progressing     Problem: Pain  Goal: Verbalizes/displays adequate comfort level or baseline 
  Problem: Discharge Planning  Goal: Discharge to home or other facility with appropriate resources  Outcome: Progressing  Flowsheets (Taken 12/25/2023 0900)  Discharge to home or other facility with appropriate resources:   Identify barriers to discharge with patient and caregiver   Arrange for needed discharge resources and transportation as appropriate     Problem: Safety - Adult  Goal: Free from fall injury  Outcome: Progressing     Problem: Skin/Tissue Integrity  Goal: Absence of new skin breakdown  Description: 1.  Monitor for areas of redness and/or skin breakdown  2.  Assess vascular access sites hourly  3.  Every 4-6 hours minimum:  Change oxygen saturation probe site  4.  Every 4-6 hours:  If on nasal continuous positive airway pressure, respiratory therapy assess nares and determine need for appliance change or resting period.  Outcome: Progressing     Problem: ABCDS Injury Assessment  Goal: Absence of physical injury  Outcome: Progressing     Problem: Confusion  Goal: Confusion, delirium, dementia, or psychosis is improved or at baseline  Description: INTERVENTIONS:  1. Assess for possible contributors to thought disturbance, including medications, impaired vision or hearing, underlying metabolic abnormalities, dehydration, psychiatric diagnoses, and notify attending LIP  2. [place] high risk fall precautions, as indicated  3. Provide frequent short contacts to provide reality reorientation, refocusing and direction  4. Decrease environmental stimuli, including noise as appropriate  5. Monitor and intervene to maintain adequate nutrition, hydration, elimination, sleep and activity  6. If unable to ensure safety without constant attention obtain sitter and review sitter guidelines with assigned personnel  7. Initiate Psychosocial CNS and Spiritual Care consult, as indicated  Outcome: Progressing  Flowsheets (Taken 12/25/2023 0900)  Effect of thought disturbance (confusion, delirium, dementia, or 
  Problem: Safety - Adult  Goal: Free from fall injury  Outcome: Progressing     Problem: Skin/Tissue Integrity  Goal: Absence of new skin breakdown  Description: 1.  Monitor for areas of redness and/or skin breakdown  2.  Assess vascular access sites hourly  3.  Every 4-6 hours minimum:  Change oxygen saturation probe site  4.  Every 4-6 hours:  If on nasal continuous positive airway pressure, respiratory therapy assess nares and determine need for appliance change or resting period.  Outcome: Progressing     Problem: ABCDS Injury Assessment  Goal: Absence of physical injury  Outcome: Progressing  Flowsheets (Taken 12/29/2023 0020)  Absence of Physical Injury: Implement safety measures based on patient assessment     
  Problem: Safety - Adult  Goal: Free from fall injury  Outcome: Progressing  Flowsheets (Taken 12/29/2023 0020)  Free From Fall Injury: Instruct family/caregiver on patient safety     Problem: Skin/Tissue Integrity  Goal: Absence of new skin breakdown  Description: 1.  Monitor for areas of redness and/or skin breakdown  2.  Assess vascular access sites hourly  3.  Every 4-6 hours minimum:  Change oxygen saturation probe site  4.  Every 4-6 hours:  If on nasal continuous positive airway pressure, respiratory therapy assess nares and determine need for appliance change or resting period.  Outcome: Progressing     Problem: ABCDS Injury Assessment  Goal: Absence of physical injury  Outcome: Progressing  Flowsheets (Taken 12/29/2023 0020)  Absence of Physical Injury: Implement safety measures based on patient assessment     
LPN  Flowsheets (Taken 12/27/2023 1431)  Verbalizes/displays adequate comfort level or baseline comfort level: Encourage patient to monitor pain and request assistance     Problem: Nutrition Deficit:  Goal: Optimize nutritional status  12/31/2023 1402 by Jazlyn Hansen II, LPN  Flowsheets (Taken 12/21/2023 1303 by Pili Parikh, RD, LD)  Nutrient intake appropriate for improving, restoring, or maintaining nutritional needs: Monitor oral intake, labs, and treatment plans     
Psychosocial CNS and Spiritual Care consult, as indicated  12/21/2023 2241 by Rayna Yuan RN  Outcome: Progressing  12/21/2023 1002 by Zeinab Mayo LPN  Outcome: Progressing     Problem: Pain  Goal: Verbalizes/displays adequate comfort level or baseline comfort level  12/21/2023 2241 by Rayna Yuan RN  Outcome: Progressing  12/21/2023 1002 by Zeinab Mayo LPN  Outcome: Progressing     Problem: Nutrition Deficit:  Goal: Optimize nutritional status  Outcome: Progressing  Flowsheets (Taken 12/21/2023 1303 by Pili Parikh, RD, LD)  Nutrient intake appropriate for improving, restoring, or maintaining nutritional needs: Monitor oral intake, labs, and treatment plans     
intervene to maintain adequate nutrition, hydration, elimination, sleep and activity  6. If unable to ensure safety without constant attention obtain sitter and review sitter guidelines with assigned personnel  7. Initiate Psychosocial CNS and Spiritual Care consult, as indicated  12/27/2023 2238 by Rayna Yuan RN  Outcome: Progressing  12/27/2023 1431 by Jazlyn Hansen II, LPN  Flowsheets (Taken 12/25/2023 0900 by Genoveva Pollack, RN)  Effect of thought disturbance (confusion, delirium, dementia, or psychosis) are managed with adequate functional status: Assess for contributors to thought disturbance, including medications, impaired vision or hearing, underlying metabolic abnormalities, dehydration, psychiatric diagnoses, notify LIP     Problem: Pain  Goal: Verbalizes/displays adequate comfort level or baseline comfort level  12/27/2023 2238 by Rayna Yuan RN  Outcome: Progressing  12/27/2023 1431 by Jazlyn Hansen II LPN  Flowsheets (Taken 12/27/2023 1431)  Verbalizes/displays adequate comfort level or baseline comfort level: Encourage patient to monitor pain and request assistance     Problem: Nutrition Deficit:  Goal: Optimize nutritional status  12/27/2023 2238 by Rayna Yuan RN  Outcome: Progressing  12/27/2023 1431 by Jazlyn Hansen II, LPN  Flowsheets (Taken 12/21/2023 1303 by Pili Parikh, RD, LD)  Nutrient intake appropriate for improving, restoring, or maintaining nutritional needs: Monitor oral intake, labs, and treatment plans     
maintain adequate nutrition, hydration, elimination, sleep and activity  6. If unable to ensure safety without constant attention obtain sitter and review sitter guidelines with assigned personnel  7. Initiate Psychosocial CNS and Spiritual Care consult, as indicated  12/25/2023 2326 by Rayna Yuan RN  Outcome: Progressing  12/25/2023 1334 by Genoveva Pollack RN  Outcome: Progressing  Flowsheets (Taken 12/25/2023 0900)  Effect of thought disturbance (confusion, delirium, dementia, or psychosis) are managed with adequate functional status: Assess for contributors to thought disturbance, including medications, impaired vision or hearing, underlying metabolic abnormalities, dehydration, psychiatric diagnoses, notify LIP     Problem: Pain  Goal: Verbalizes/displays adequate comfort level or baseline comfort level  12/25/2023 2326 by Rayna Yuan RN  Outcome: Progressing  12/25/2023 1334 by Genoveva Pollack RN  Outcome: Progressing     Problem: Nutrition Deficit:  Goal: Optimize nutritional status  12/25/2023 1334 by Genoveva Pollack RN  Outcome: Progressing     
care include recreational therapy, dietary, respiratory therapy, and neuropsychology.    Medical issues being managed closely and that require 24 hour availability of a physician:  Pain management, Infection protection, DVT prophylaxis, Fall precautions, Fluid/Electrolyte balance, Nutritional status, Respiratory needs, Anemia, and History of heart disease                                           Physician anticipated functional outcomes: Improved independence with functional measures   Estimated length of stay for this admission 10-14 days  Medical Prognosis: Fair  Anticipated disposition: Home.      The potential to achieve the above medical and rehabilitative goals is fair - she has done well in rehab in the past and is having a functional decline. Requiring medical management for delirium/hallucinations and UTI..    This plan of care has been developed with the assistance and input of the multidisciplinary rehabilitation team.  The plan was reviewed with the patient on 12/22/2023.  The patient has had the opportunity to provide input to the therapy team.    I have reviewed this Individualized Plan of Care and agree with its contents.  Above documentation has been expanded, modified, adjusted to reflect the findings of my evaluations and goals for the patient.    Physician:  Electronically signed by SLICK JOHNSON MD on 12/22/23 at 9:20 AM EST

## 2024-01-02 NOTE — DISCHARGE SUMMARY
effects of medications including concomitant use of Norco and methadone.      Internal medicine discontinued the Norco. She developed onset of AMS and was diagnosed with UTI. She had difficulty swallowing capsules so Keflex was changed to IV Rocephin - planned through 12/24/23.     She was requiring assistance for self-care activities and mobility, prompting admission to acute rehab.      Rehab course:   The patient participated in an aggressive multidisciplinary inpatient rehabilitation program involving 3 hours per day, 5 days per week of rehabilitation.  Patient benefited from inpatient rehab and was discharged in good stable condition.    She was noted to have hallucinations and impaired cognition during admission, which was likely secondary to decreased sleep.  She was started on seroquel nightly after melatonin was ineffective; sleep, cognition, and hallucinations improved.  Psychiatry was consulted and had no additional recommendations, as patient was improving.  CT head showed no acute intracranial abnormality but did show a hypoattenuating focus in the left posterior limb of the corona radiata, compatible with an age-indeterminate lacunar infarct.  Neurology was consulted and thought that the hypodensity on CT was related to an area of T2 hyperintensity on a recent MRI, which was consistent with chronic ischemic white matter changes.  They did not recommend repeat MRI.  She had a telesitter during admission due to impaired cognition, but this was discontinued on 12/30/23 due to improvement.  She is on regular solids and thin liquids at the time of discharge.  She completed a course of ceftriaxone for UTI on 12/23/23.  She was found to have a vitamin B1 deficiency and was started on oral supplementation on 12/30/23.  Antihypertensive medications were adjusted during admission with improvement in blood pressure.  Chronic conditions were otherwise stable on medications.       Patient evaluated today:  GEN:

## 2024-01-02 NOTE — PROGRESS NOTES
[facility] Neurology   IN-PATIENT SERVICE      NEUROLOGY PROGRESS  NOTE                Interval History:     Sitting up in chair.   No current complaints. Doing well overall.     History of Present Illness:     The patient is a 63 y.o. female who presents with Fatigue  . The patient was seen and examined and the chart was reviewed.  Presents to the hospital 12/12 with complaints of fatigue, generalized weakness, decreased energy.  Found to have elevated troponins, admitted for further workup.  She has history of multiple spine surgeries and chronic back pain on Norco and methadone at home.  She has been evaluated by cardiology for elevated troponin suggestive of type II and no acute coronary syndrome.  Blood pressure meds modified, 2D echo was checked; EF 50 to 55%, LA, RA normal size.  Psychiatry also consulted due to anxiety and depression.  It is reported that she had been on BuSpar in the past but did not work very well for her.  She has history of chronic alcoholism but is no longer daily drinker.  She was started on Zoloft on 12/13.  It was then noted on early morning of 12/14 that patient started to have some hallucinations, agitation and disorientation. Telesitter needed to be initiated.  Patient was also given Ativan 0.5 mg around 6 PM.  Discharge plans were held due to this and neurology consulted.  As of this morning 12/15 it seems that patient is much improved.  She is seen around midday by consultant and is awake alert and oriented x 3 following all commands.  Her  is at bedside in agreement that she is significantly improved today.  Continues to have one-to-one sitter.  She has not required any Ativan today.  .  Patient was seen recently in October outpatient visit with neurosurgery Dr. Ludwig.  At that time it was noted that her overall functional status has significantly improved and had been mobilizing with usage of the walker.  It was noted prior to her surgery she had required a 
        [facility] Neurology   IN-PATIENT SERVICE      NEUROLOGY PROGRESS  NOTE                Interval History:     Continues to do well, minimal confusion present as per  at bedside.  Working with therapies.  Her discharge date tentatively is 1/2.    History of Present Illness:        The patient is a 63 y.o. female who presents with Fatigue  . The patient was seen and examined and the chart was reviewed.  Presents to the hospital 12/12 with complaints of fatigue, generalized weakness, decreased energy.  Found to have elevated troponins, admitted for further workup.  She has history of multiple spine surgeries and chronic back pain on Norco and methadone at home.  She has been evaluated by cardiology for elevated troponin suggestive of type II and no acute coronary syndrome.  Blood pressure meds modified, 2D echo was checked; EF 50 to 55%, LA, RA normal size.  Psychiatry also consulted due to anxiety and depression.  It is reported that she had been on BuSpar in the past but did not work very well for her.  She has history of chronic alcoholism but is no longer daily drinker.  She was started on Zoloft on 12/13.  It was then noted on early morning of 12/14 that patient started to have some hallucinations, agitation and disorientation. Telesitter needed to be initiated.  Patient was also given Ativan 0.5 mg around 6 PM.  Discharge plans were held due to this and neurology consulted.  As of this morning 12/15 it seems that patient is much improved.  She is seen around midday by consultant and is awake alert and oriented x 3 following all commands.  Her  is at bedside in agreement that she is significantly improved today.  Continues to have one-to-one sitter.  She has not required any Ativan today.  .  Patient was seen recently in October outpatient visit with neurosurgery Dr. Ludwig.  At that time it was noted that her overall functional status has significantly improved and had been mobilizing with usage 
   12/21/23 1130   Encounter Summary   Encounter Overview/Reason  Attempted Encounter   Service Provided For: Patient not available  (Patient with staff.)   Referral/Consult From: Rounding   Assessment/Intervention/Outcome   Assessment Unable to assess  (Patient unavailable with staff.)       
  ACUTE INPATIENT REHABILITATION  [facility]    Case Management Assessment: IRF BRUNO 4.0     Patient Health Questionnaire-9 (PHQ-2 to 9)   Over the last 2 weeks, how often have you been bothered by any of the following problems?    1. Little Interest or pleasure in doing things?   Never or 1 Day - Score 0    2. Feeling down, depressed or hopeless?   Never or 1 Day - Score 0    3. Trouble falling or staying asleep, or sleeping too much?   Never or 1 Day - Score 0    4. Feeling tired or having little energy?   Never or 1 Day - Score 0    5. Poor appetite or overeating?   Never or 1 Day - Score 0    6. Feeling bad about yourself-or that you are a failure or have let yourself or your family down?   Never or 1 Day - Score 0    7. Trouble concentrating on things, such as reading the newspaper or watching television?    Never or 1 Day - Score 0    8. Moving or speaking so slowly that other people could have noticed? Or the opposite-being so fidgety or restless that you have been moving around a lot more than usual?  Never or 1 Day - Score 0    9. Thoughts that you would be better off dead or of hurting yourself in some way?   Never or 1 Day - Score 0    Total Score: 0  If score is above 15, Notify PM&R Physician    If you checked off any problems, how difficult have these problems made it for you to do your work, take care of things at home, or get along with other people?   [x] Not difficult at all     [] Somewhat Difficult     [] Very Difficult     [] Extremely Difficult         Social Isolation     How often do you feel lonely or isolated from those around you?  Rarely       Access To Transportation     2.In the past six months to a year, has lack of transportation kept you from medical appointments or from getting your medications?”     No    3.In the past six months to a year, has lack of transportation kept you from non-medical meetings, appointments, work, or from getting things that you need?     No      
  ACUTE INPATIENT REHABILITATION ADMISSION  [facility]    Patient Name: Rajani Iglesias  MRN: [id number]   Date of Admit: 12/20/2023  Time of Arrival: 2045    Patient admitted to the Acute Inpatient Rehabilitation Unit via Bed    Patient oriented to room, unit and fall prevention safety measures.  Education provided on the rehabilitation routine and therapy schedules.    Drug / Medication Regimen Review   Admitting medication orders compared with acute stay medications; home medication list reviewed with patient/family.      Medication Issues Identified ? No If Yes, Check All That Apply   []  Allergy to medication   []  Drug interactions (drug/drug, drug/food, drug/disease interactions)   []  Duplicate drug   []  Omission (drug missing from prescribed regimen)   []  Non adherence   []  Adverse reaction   []  Wrong patient, drug, dose, route, time error   []  Ineffective drug therapy       High-Risk Drug Classes: Use and Indication   Check if the patient is taking any medications by pharmacological classification If yes, check if there is an indication noted for all meds in the drug class   Antipsychotic Yes If yes: indication noted?  [] If no indication noted, follow up with provider for order clarification   Anticoagulant Yes If yes: indication noted?  []    Antibiotic Yes If yes: indication noted?  []    Opioid Yes If yes: indication noted?  []    Antiplatelet Yes If yes: indication noted?  []    Hypoglycemic (Including Insulin) No If yes: indication noted?  []      Attending Physical Medicine & Rehabilitation (PM&R) Admitting Order Review   Admission orders reviewed with Acute Inpatient Rehabilitation Attending PM&R Physician: Christy Lynn MD     Skin Assessment   Skin assessment performed by two nurses: all active alterations in skin integrity, wounds, lines, drains and airways assessed, measured, recorded and reconciled. Refer to LDA avatar and LDA flowsheet for additional information.    Nurse 1 
  ACUTE INPATIENT REHABILITATION DISCHARGE  [facility]    Patient Name: Rajani Iglesias  MRN: [id number]     Patient discharged in stable condition as per order of attending physician.     AVS provided by nurse at time of discharge, which includes all necessary medical information pertaining to the patients current course of illness, treatment, medications, post-discharge goals of care, and treatment preferences.     Provision of Current Reconciled Medication List to Patient at Discharge   Indicate the route(s) of transmission of the current reconciled medication list to the patient/family/caregiver. Paper Based (e.g. fax, copies, print outs)     Availability of \"My Chart\" offered to patient as a tool for updated health record.  Steps for activation discussed with patient as mentioned on AVS.      Patient/responsible party verbalize understanding of discharge plan and are in agreement with goal/plan/treatment preferences.     Belongings including Glasses sent with patient/responsible party.      Home medications sent home with patient/responsible party N/A    Car Transfer   Level of assistance required for patient transfer into vehicle:   INDEPENDENT: Patient completes the activity by him/herself with no assistance from a helper     High-Risk Drug Classes: Use and Indication   Check if the patient is taking any medications by pharmacological classification If yes, check if there is an indication noted for all meds in the drug class   Antipsychotic No If yes: indication noted?  [] If no indication noted, follow up with provider for order clarification   Anticoagulant Yes If yes: indication noted?  []    Antibiotic No If yes: indication noted?  []    Opioid Yes If yes: indication noted?  []    Antiplatelet Yes If yes: indication noted?  []    Hypoglycemic (Including Insulin) No If yes: indication noted?  []        Pain Assessment   Over the past 5 days, how much of the time has pain made it hard for you to sleep 
.aru  
1900 BP was 151/89 with a pulse of 92. Writer updated Shirley Waterhouse, NP, via [employer].   Awaiting response.   
Blood pressure recheck 126/65.   
[facility]   Acute Rehabilitation Occupational Therapy Daily Treatment Note    Date: 23  Patient Name: Rajani Iglesias       Room: 2626/2626-01  MRN: [id number]  Account: [id number]   : 1960  (63 y.o.) Gender: female       Referring Practitioner: Christy Lynn MD  Diagnosis: Functional quadriplegia with recent decline in mobility and ADLs  Additional Pertinent Hx: Rajani Iglesias is a 63 y.o. RHD female admitted to [facility] on 2023.       Patient with fatigue, poor appetite, trouble walking, and difficulty swallowing with associated weight loss. She has known history cauda equina syndrome and chronic back pain. Cardiology: consulted for elevated troponin, likely type II. Added coreg to Cardizem and lisinopril. Checked 2D Echo. Results below. Cardiology signed off after low risk echo with noted diastolic dysfunction. For outpatient follow up 2-4 weeks to consider outpatient stress test.      Psychiatry: evaluated for depression unspecified. Continued Buspirone and added Zoloft.      Neurology: she had recent eval by Dr. Ludwig in neurosurgery who noted her functional had improved and she was ambulating with walker after requiring wheelchair for progressive quadriparesis. She has known history cervical myelopathy that was surgically decompressed and lumbar spine stenosis treated with laminectomy. Delirium had improved. MRI brain was negative for acute changes. Some question about adverse effects of medications including concomitant use of Norco and methadone. Internal medicine discontinued the [place]. Pt admitted to ARU on 23.    Treatment Diagnosis: impaired self care status    Past Medical History:  has a past medical history of Anxiety, Arthritis, At maximum risk for fall, Breast cancer (HCC), Cancer (HCC), Cauda equina syndrome (HCC), Cervical stenosis of spine, GERD (gastroesophageal reflux disease), History of stomach ulcers, History of therapeutic radiation, 
Comprehensive Nutrition Assessment    Type and Reason for Visit:  Reassess    Nutrition Recommendations/Plan:   Continue current diet, unless liberalization approved by SLP/ physician.  Continue oral nutrition supplements.      Malnutrition Assessment:  Malnutrition Status:  Moderate malnutrition (12/21/23 1317)    Context:  Chronic Illness     Findings of the 6 clinical characteristics of malnutrition:  Energy Intake:  75% or less estimated energy requirements for 1 month or longer  Weight Loss:   (Possible wt loss over the past 2 months)     Body Fat Loss:   (Moderate) Triceps   Muscle Mass Loss:   (Moderate) Calf (gastrocnemius)  Fluid Accumulation:  Mild Extremities   Strength:  Not Performed    Nutrition Assessment:    Pt sttes she is eating okay and drinking the supplements. Questioned diet restrictions and would like some liberalization in diet texture.    Nutrition Related Findings:    Edema: +1 RUE. Labs and meds reviewed. Wound Type: Surgical Incision       Current Nutrition Intake & Therapies:    Average Meal Intake: 51-75%, % (Estimated)  Average Supplements Intake: 51-75%  ADULT DIET; Easy to Chew  ADULT ORAL NUTRITION SUPPLEMENT; Breakfast, Dinner; Standard High Calorie/High Protein Oral Supplement    Anthropometric Measures:  Height: 154.9 cm (5' 1\")  Ideal Body Weight (IBW): 105 lbs (48 kg)    Admission Body Weight: 50 kg (110 lb 3.7 oz)  Current Body Weight: 50 kg (110 lb 3.7 oz), 104.8 % IBW. Weight Source: Not Specified  Current BMI (kg/m2): 20.8  Usual Body Weight: 61.2 kg (135 lb) (10/23)  % Weight Change (Calculated): -18.5  Weight Adjustment For: Paraplegia  Total Adjusted Percentage (Calculated): 7.5  Adjusted Ideal Body Weight (lbs) (Calculated): 97.1 lbs  Adjusted Ideal Body Weight (kg) (Calculated): 44.14 kg  Adjusted % Ideal Body Weight (Calculated): 113.3  Adjusted BMI (kg/m2) (Calculated): 22.4  BMI Categories: Normal Weight (BMI 18.5-24.9)    Estimated Daily Nutrient 
Comprehensive Nutrition Assessment    Type and Reason for Visit:  Reassess    Nutrition Recommendations/Plan:   Continue current diet.   Provide High Calorie, High Protein Oral Nutrition Supplement  x 2 daily.      Malnutrition Assessment:  Malnutrition Status:  Moderate malnutrition (12/21/23 1317)    Context:  Chronic Illness     Findings of the 6 clinical characteristics of malnutrition:  Energy Intake:  75% or less estimated energy requirements for 1 month or longer  Weight Loss:   (Possible wt loss over the past 2 months)     Body Fat Loss:   (Moderate) Triceps   Muscle Mass Loss:   (Moderate) Calf (gastrocnemius)  Fluid Accumulation:  Mild Extremities   Strength:  Not Performed    Nutrition Assessment:    Pt states she is eating ok. Likes Ensure, but prefers vanilla or chocolate and would like it just twice daily.    Nutrition Related Findings:    Labs and meds reviewed. Wound Type: Surgical Incision       Current Nutrition Intake & Therapies:    Average Meal Intake: 26-50%, 51-75% (Estimated average intake, meals and suppelments; overall decreased recently)  Average Supplements Intake: 26-50%, 51-75%  ADULT DIET; Easy to Chew  ADULT ORAL NUTRITION SUPPLEMENT; Breakfast, Dinner; Standard High Calorie/High Protein Oral Supplement    Anthropometric Measures:  Height: 154.9 cm (5' 1\")  Ideal Body Weight (IBW): 105 lbs (48 kg)    Admission Body Weight: 50 kg (110 lb 3.7 oz)  Current Body Weight: 50 kg (110 lb 3.7 oz), 104.8 % IBW. Weight Source: Not Specified  Current BMI (kg/m2): 20.8  Usual Body Weight: 61.2 kg (135 lb) (10/23)  % Weight Change (Calculated): -18.5  Weight Adjustment For: Paraplegia  Total Adjusted Percentage (Calculated): 7.5  Adjusted Ideal Body Weight (lbs) (Calculated): 97.1 lbs  Adjusted Ideal Body Weight (kg) (Calculated): 44.14 kg  Adjusted % Ideal Body Weight (Calculated): 113.3  Adjusted BMI (kg/m2) (Calculated): 22.4  BMI Categories: Normal Weight (BMI 18.5-24.9)    Estimated Daily 
[facility]   Acute Rehabilitation Occupational Therapy Evaluation     Date: 23  Patient Name: Rajani Iglesias       Room: 2626/2626-01  MRN: [id number]  Account: [id number]   : 1960  (63 y.o.) Gender: female       Referring Practitioner: Christy Lynn MD  Diagnosis: Functional quadriplegia with recent decline in mobility and ADLs  Additional Pertinent Hx: Rajani Iglesias is a 63 y.o. RHD female admitted to [facility] on 2023.       Patient with fatigue, poor appetite, trouble walking, and difficulty swallowing with associated weight loss. She has known history cauda equina syndrome and chronic back pain. Cardiology: consulted for elevated troponin, likely type II. Added coreg to Cardizem and lisinopril. Checked 2D Echo. Results below. Cardiology signed off after low risk echo with noted diastolic dysfunction. For outpatient follow up 2-4 weeks to consider outpatient stress test.      Psychiatry: evaluated for depression unspecified. Continued Buspirone and added Zoloft.      Neurology: she had recent eval by Dr. Ludwig in neurosurgery who noted her functional had improved and she was ambulating with walker after requiring wheelchair for progressive quadriparesis. She has known history cervical myelopathy that was surgically decompressed and lumbar spine stenosis treated with laminectomy. Delirium had improved. MRI brain was negative for acute changes. Some question about adverse effects of medications including concomitant use of Norco and methadone. Internal medicine discontinued the [place]. Pt admitted to ARU on 23.    Treatment Diagnosis: impaired self care status    Past Medical History:  has a past medical history of Anxiety, Arthritis, At maximum risk for fall, Breast cancer (HCC), Cancer (HCC), Cauda equina syndrome (HCC), Cervical stenosis of spine, GERD (gastroesophageal reflux disease), History of stomach ulcers, History of therapeutic radiation, Hx 
[facility]   Acute Rehabilitation Occupational Therapy Daily Treatment Note    Date: 24  Patient Name: Rajani Iglesias       Room: 2626/2626-01  MRN: [id number]  Account: [id number]   : 1960  (63 y.o.) Gender: female       Referring Practitioner: Christy Lynn MD  Diagnosis: Functional quadriplegia with recent decline in mobility and ADLs  Additional Pertinent Hx: Rajani Iglesias is a 63 y.o. RHD female admitted to [facility] on 2023.       Patient with fatigue, poor appetite, trouble walking, and difficulty swallowing with associated weight loss. She has known history cauda equina syndrome and chronic back pain. Cardiology: consulted for elevated troponin, likely type II. Added coreg to Cardizem and lisinopril. Checked 2D Echo. Results below. Cardiology signed off after low risk echo with noted diastolic dysfunction. For outpatient follow up 2-4 weeks to consider outpatient stress test.      Psychiatry: evaluated for depression unspecified. Continued Buspirone and added Zoloft.      Neurology: she had recent eval by Dr. Ludwig in neurosurgery who noted her functional had improved and she was ambulating with walker after requiring wheelchair for progressive quadriparesis. She has known history cervical myelopathy that was surgically decompressed and lumbar spine stenosis treated with laminectomy. Delirium had improved. MRI brain was negative for acute changes. Some question about adverse effects of medications including concomitant use of Norco and methadone. Internal medicine discontinued the [place]. Pt admitted to ARU on 23.    Treatment Diagnosis: impaired self care status    Past Medical History:  has a past medical history of Anxiety, Arthritis, At maximum risk for fall, Breast cancer (HCC), Cancer (HCC), Cauda equina syndrome (HCC), Cervical stenosis of spine, GERD (gastroesophageal reflux disease), History of stomach ulcers, History of therapeutic radiation, Hx 
Dr. Kim notified via [employer] of consult order.   
Dr. LA Wells notified via PerfectServe of consult d/t abnormal CT scan  
Dr. Sylvester notified of pts' /88 P94 after second dose of Coreg. At this time monitor BP recheck at 7p and update Dr. Han.  
[facility]  IN-PATIENT SERVICE  [facility]    PROGRESS NOTE             Date:   1/1/2024  Patient name:  Rajani Iglesias  Date of admission:  12/20/2023  8:59 PM  MRN:   [id number]  YOB: 1960    INTERVAL HISTORY:    SUBJECTIVE:  Doing well, currently denies concerns.      Objective   Vitals:    01/01/24 0803 01/01/24 0839 01/01/24 1510 01/01/24 1732   BP: 139/74  127/76 139/74   Pulse: 85  91 95   Resp:  18  18   Temp:    98.3 °F (36.8 °C)   TempSrc:    Oral   SpO2: 97%  99% 99%   Weight:       Height:         BP Readings from Last 6 Encounters:   01/01/24 139/74   12/20/23 132/72   10/09/23 125/70   07/03/23 138/84   04/17/23 129/77   03/24/23 128/70        No intake or output data in the 24 hours ending 01/01/24 1813  General appearance: poor nourished  HEENT: Normocephalic, no lesions, without obvious abnormality.pupils equal and reactive, EOM normal  Lungs: clear to auscultation bilaterally  Heart: regular rate and rhythm, S1, S2 normal, no murmur, click, rub or gallop  Abdomen: soft, non-tender; bowel sounds normal; no masses,  no organomegaly  Extremities: extremities normal, atraumatic, no cyanosis or edema. Pulses 2+ and symmetrical in all 4 extremities    CBC: No results for input(s): \"WBC\", \"HGB\", \"PLT\" in the last 72 hours.  BMP:  No results for input(s): \"NA\", \"K\", \"CL\", \"CO2\", \"BUN\", \"CREATININE\", \"CALCIUM\" in the last 72 hours.  Magnesium:No results for input(s): \"MG\" in the last 72 hours.  No results for input(s): \"AST\", \"ALT\", \"ALB\", \"TP\" in the last 72 hours.    Invalid input(s): \"ALP\", \"TBIL\", \"BILID\"  Phosphorus:No results for input(s): \"PHOS\" in the last 72 hours.  Glucose:No results for input(s): \"POCGLU\" in the last 72 hours.    IMAGING  Reviewed    MICRO   [unfilled]       SCHEDULED MEDICATIONS PRN MEDICATIONS   carvedilol, 6.25 mg, BID WC  lisinopril, 5 mg, Daily  thiamine mononitrate, 100 mg, Daily  enoxaparin, 30 mg, 
Okay to hold today's dose of lisinopril per Dr. Heller. Okay to give coreg still.   
PCT had patient in bathroom. The patient stated she just want to sit down and the PCT lowered her to the floor. No injuries and no pain. The patient was assisted to the wheel chair then onto the toilet. After voiding the patient returned to the chair with chair alarm on. The patient did not fall.   
Patient has been taking off personal alarm. Patient transferred into bed to get purse by herself with taking personal alarm. Then tried to get back into the chair without asking for help. Writer was notified by dietary aid. Writer assisted patient back into chair. Personal alarm was put back on the patient but a different location. Dr. Escudero notified and tele-sitter to be initiated. Patient has very poor safety awareness.   
Peripheral IV insertion initiated and one time dose of IV Hydralazine, per order, given.   Will recheck BP in one hour.   
Peripheral IV removed from left wrist. No complications, no bleeding at site, patient tolerated well, denies pain or discomfort to area.   
Physical Medicine & Rehabilitation  Progress Note      Subjective:      63 year-old female with functional quadriplegia with recent decline in mobility. Patient is well, and has had no acute complaints or problems. Reports some continued issues with \"bad dreams\" impairing her sleep. Continues to be impulsive. Observed transferring/ambulating short distance in bathroom with OT today.     ROS:  Denies fevers, chills, sweats.  No chest pain, palpitations, lightheadedness.  Denies coughing, wheezing or shortness of breath.  Denies abdominal pain, nausea, diarrhea or constipation.  No new areas of joint pain.  Denies new areas of numbness or weakness.  Denies new anxiety or depression issues.  No new skin problems.    Rehabilitation:       PT:    Bed mobility  Rolling to Left: Stand by assistance  Rolling to Right: Stand by assistance  Supine to Sit: Stand by assistance (HOB slightly elevated)  Sit to Supine: Unable to assess (up in chair)  Scooting: Stand by assistance, Contact guard assistance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 Practitioner: Dr. Leeann Lynn  Referral Date : 
Physical Therapy  Facility/Department: [facility] ACUTE REHAB      NAME: Rajani Iglesias  : 1960 (63 y.o.)  MRN: [id number]  CODE STATUS: Full Code    Date of Service: 23      Past Medical History:   Diagnosis Date    Anxiety     Arthritis     At maximum risk for fall 2021    caudal equina syndrome and cervical stenosis    Breast cancer (HCC)     Cancer (HCC)     right breast    Cauda equina syndrome (HCC) 2021    neuropathy, mobility difficulty, incontinance    Cervical stenosis of spine 2021    GERD (gastroesophageal reflux disease)     History of stomach ulcers     History of therapeutic radiation     Hx antineoplastic chemo     Hypertension     Dr. MARY LOU Brasher    Hypothyroidism     Lumbar neuritis     Post laminectomy syndrome     Spinal deformity 2021    cervical and lumbar    Thyroid disease     Uses wheelchair     Wears dentures     Wellness examination     Dr. MARY LOU Brasher     Past Surgical History:   Procedure Laterality Date    BACK SURGERY      lower back x 3, cervical- x 1: C4- C6, 2014     BREAST SURGERY Right     mastectomy with reconstruction    CERVICAL FUSION N/A 2022    C5 CORPECTOMY, USE OF INTRAOPERATIVE CERVICAL TRACTION performed by Linda Ludwig DO at [facility] OR    CERVICAL FUSION N/A 2022    POSTERIOR FIXATION C2-C7 performed by Linda Ludwig DO at [facility] OR    COLONOSCOPY  about 2018    HERNIA REPAIR Bilateral     inguinal    HYSTERECTOMY, TOTAL ABDOMINAL (CERVIX REMOVED)      LUMBAR SPINE SURGERY N/A 2021    LUMBAR LAMINECTOMY L2-S1 performed by Linda Ludwig DO at [facility] OR    MASTECTOMY, RADICAL      OTHER SURGICAL HISTORY  2022    C5 CORPECTOMY, USE OF INTRAOPERATIVE CERVICAL TRACTION (N/A Spine Cervical)        Chart Reviewed: Yes  Referring Practitioner: Dr. Leeann Lynn  Referral Date : 23  Diagnosis: Functional Quadriplegia    Restrictions:  Restrictions/Precautions: General Precautions;Fall Risk;Up as Tolerated  Position Activity 
Physical Therapy  Facility/Department: [facility] ACUTE REHAB  Rehabilitation Physical Therapy     NAME: Rajani Iglesias  : 1960 (63 y.o.)  MRN: [id number]  CODE STATUS: Full Code    Date of Service: 23      Past Medical History:   Diagnosis Date    Anxiety     Arthritis     At maximum risk for fall 2021    caudal equina syndrome and cervical stenosis    Breast cancer (HCC)     Cancer (HCC)     right breast    Cauda equina syndrome (HCC) 2021    neuropathy, mobility difficulty, incontinance    Cervical stenosis of spine 2021    GERD (gastroesophageal reflux disease)     History of stomach ulcers     History of therapeutic radiation     Hx antineoplastic chemo     Hypertension     Dr. MARY LOU Brasher    Hypothyroidism     Lumbar neuritis     Post laminectomy syndrome     Spinal deformity 2021    cervical and lumbar    Thyroid disease     Uses wheelchair     Wears dentures     Wellness examination     Dr. MARY LOU Brasher     Past Surgical History:   Procedure Laterality Date    BACK SURGERY      lower back x 3, cervical- x 1: C4- C6, 2014     BREAST SURGERY Right     mastectomy with reconstruction    CERVICAL FUSION N/A 2022    C5 CORPECTOMY, USE OF INTRAOPERATIVE CERVICAL TRACTION performed by Linda Ludwig DO at [facility] OR    CERVICAL FUSION N/A 2022    POSTERIOR FIXATION C2-C7 performed by Linda Ludwig DO at [facility] OR    COLONOSCOPY  about 2018    HERNIA REPAIR Bilateral     inguinal    HYSTERECTOMY, TOTAL ABDOMINAL (CERVIX REMOVED)      LUMBAR SPINE SURGERY N/A 2021    LUMBAR LAMINECTOMY L2-S1 performed by Linda Ludwig DO at [facility] OR    MASTECTOMY, RADICAL      OTHER SURGICAL HISTORY  2022    C5 CORPECTOMY, USE OF INTRAOPERATIVE CERVICAL TRACTION (N/A Spine Cervical)        Chart Reviewed: Yes  Patient assessed for rehabilitation services?: Yes  Family / Caregiver Present: Yes (spouse Emmett)  Referring Practitioner: Dr. Leeann Lynn  Referral Date : 23  Diagnosis: 
Physical Therapy  Facility/Department: [facility] ACUTE REHAB  Rehabilitation Physical Therapy     NAME: Rajani Iglesias  : 1960 (63 y.o.)  MRN: [id number]  CODE STATUS: Full Code    Date of Service: 23      Past Medical History:   Diagnosis Date    Anxiety     Arthritis     At maximum risk for fall 2021    caudal equina syndrome and cervical stenosis    Breast cancer (HCC)     Cancer (HCC)     right breast    Cauda equina syndrome (HCC) 2021    neuropathy, mobility difficulty, incontinance    Cervical stenosis of spine 2021    GERD (gastroesophageal reflux disease)     History of stomach ulcers     History of therapeutic radiation     Hx antineoplastic chemo     Hypertension     Dr. MARY LOU Brasher    Hypothyroidism     Lumbar neuritis     Post laminectomy syndrome     Spinal deformity 2021    cervical and lumbar    Thyroid disease     Uses wheelchair     Wears dentures     Wellness examination     Dr. MARY LOU Brasher     Past Surgical History:   Procedure Laterality Date    BACK SURGERY      lower back x 3, cervical- x 1: C4- C6, 2014     BREAST SURGERY Right     mastectomy with reconstruction    CERVICAL FUSION N/A 2022    C5 CORPECTOMY, USE OF INTRAOPERATIVE CERVICAL TRACTION performed by Linda Ludwig DO at [facility] OR    CERVICAL FUSION N/A 2022    POSTERIOR FIXATION C2-C7 performed by Linda Ludwig DO at [facility] OR    COLONOSCOPY  about 2018    HERNIA REPAIR Bilateral     inguinal    HYSTERECTOMY, TOTAL ABDOMINAL (CERVIX REMOVED)      LUMBAR SPINE SURGERY N/A 2021    LUMBAR LAMINECTOMY L2-S1 performed by Linda Ludwig DO at [facility] OR    MASTECTOMY, RADICAL      OTHER SURGICAL HISTORY  2022    C5 CORPECTOMY, USE OF INTRAOPERATIVE CERVICAL TRACTION (N/A Spine Cervical)        Chart Reviewed: Yes  Patient assessed for rehabilitation services?: Yes  Family / Caregiver Present: Yes (spouse Emmett)  Referring Practitioner: Dr. Leeann Lynn  Referral Date : 23  Diagnosis: 
Physical Therapy  Facility/Department: [facility] ACUTE REHAB  Rehabilitation Physical Therapy     NAME: Rajani Iglesias  : 1960 (63 y.o.)  MRN: [id number]  CODE STATUS: Full Code    Date of Service: 23      Past Medical History:   Diagnosis Date    Anxiety     Arthritis     At maximum risk for fall 2021    caudal equina syndrome and cervical stenosis    Breast cancer (HCC)     Cancer (HCC)     right breast    Cauda equina syndrome (HCC) 2021    neuropathy, mobility difficulty, incontinance    Cervical stenosis of spine 2021    GERD (gastroesophageal reflux disease)     History of stomach ulcers     History of therapeutic radiation     Hx antineoplastic chemo     Hypertension     Dr. MARY LOU Brasher    Hypothyroidism     Lumbar neuritis     Post laminectomy syndrome     Spinal deformity 2021    cervical and lumbar    Thyroid disease     Uses wheelchair     Wears dentures     Wellness examination     Dr. MARY LOU Brasher     Past Surgical History:   Procedure Laterality Date    BACK SURGERY      lower back x 3, cervical- x 1: C4- C6, 2014     BREAST SURGERY Right     mastectomy with reconstruction    CERVICAL FUSION N/A 2022    C5 CORPECTOMY, USE OF INTRAOPERATIVE CERVICAL TRACTION performed by Linda Ludwig DO at [facility] OR    CERVICAL FUSION N/A 2022    POSTERIOR FIXATION C2-C7 performed by Linda Ludwig DO at [facility] OR    COLONOSCOPY  about 2018    HERNIA REPAIR Bilateral     inguinal    HYSTERECTOMY, TOTAL ABDOMINAL (CERVIX REMOVED)      LUMBAR SPINE SURGERY N/A 2021    LUMBAR LAMINECTOMY L2-S1 performed by Linda Ludwig DO at [facility] OR    MASTECTOMY, RADICAL      OTHER SURGICAL HISTORY  2022    C5 CORPECTOMY, USE OF INTRAOPERATIVE CERVICAL TRACTION (N/A Spine Cervical)        Chart Reviewed: Yes  Patient assessed for rehabilitation services?: Yes  Family / Caregiver Present: Yes (spouse Emmett)  Referring Practitioner: Dr. Leeann Lynn  Referral Date : 23  Diagnosis: 
Physical Therapy  [facility]     Date: 23  Patient Name: Rajani Iglesias       Room: 2626/2626-01  MRN: [id number]  Account: [id number]   : 1960  (63 y.o.) Gender: female   Patient Height Height: 154.9 cm (5' 1\")  Patient Weight 50 kg (110 lb 4.8 oz)     Called pt's daughter to schedule family training. She will be here on 23. Daughter Mary reports pt's SO, her and her 3 adult children will be able to provide 24/7 supervision at discharge.   Electronically signed by Tressa Garcia PT on 2023 at 5:17 PM    
[facility]   Acute Rehabilitation Occupational Therapy Daily Treatment Note    Date: 24  Patient Name: Rajani Iglesias       Room: 2626/2626-01  MRN: [id number]  Account: [id number]   : 1960  (63 y.o.) Gender: female       Referring Practitioner: Christy Lynn MD  Diagnosis: Functional quadriplegia with recent decline in mobility and ADLs  Additional Pertinent Hx: Rajani Iglesias is a 63 y.o. RHD female admitted to [facility] on 2023.       Patient with fatigue, poor appetite, trouble walking, and difficulty swallowing with associated weight loss. She has known history cauda equina syndrome and chronic back pain. Cardiology: consulted for elevated troponin, likely type II. Added coreg to Cardizem and lisinopril. Checked 2D Echo. Results below. Cardiology signed off after low risk echo with noted diastolic dysfunction. For outpatient follow up 2-4 weeks to consider outpatient stress test.      Psychiatry: evaluated for depression unspecified. Continued Buspirone and added Zoloft.      Neurology: she had recent eval by Dr. Ludwig in neurosurgery who noted her functional had improved and she was ambulating with walker after requiring wheelchair for progressive quadriparesis. She has known history cervical myelopathy that was surgically decompressed and lumbar spine stenosis treated with laminectomy. Delirium had improved. MRI brain was negative for acute changes. Some question about adverse effects of medications including concomitant use of Norco and methadone. Internal medicine discontinued the [place]. Pt admitted to ARU on 23.    Treatment Diagnosis: impaired self care status    Past Medical History:  has a past medical history of Anxiety, Arthritis, At maximum risk for fall, Breast cancer (HCC), Cancer (HCC), Cauda equina syndrome (HCC), Cervical stenosis of spine, GERD (gastroesophageal reflux disease), History of stomach ulcers, History of therapeutic radiation, Hx 
SLP ALL NOTES  Speech Language Pathology  [facility] Acute Rehab Unit at [facility]    Cognitive Treatment Note    Date: 12/22/2023  Patient’s Name: Rajani Iglesias  MRN: [id number]  Diagnosis:   Patient Active Problem List   Diagnosis Code    Stenosis of cervical spine with myelopathy (HCC) M48.02, G99.2    Uncontrolled hypertension I10    Hypothyroidism E03.9    Lumbar radiculopathy, chronic M54.16    Lumbar neuritis M54.16    Post laminectomy syndrome M96.1    Hypokalemia E87.6    Pain of right hip joint M25.551    Post-menopausal osteoporosis M81.0    Chronic gastritis without bleeding K29.50    Elevated CEA R97.0    Esophageal dysphagia R13.19    Hypotension due to drugs I95.2    Ulcerative esophagitis K22.10    Weight loss, abnormal R63.4    Cervical myelopathy ([facility]) G95.9    Lumbar stenosis with neurogenic claudication M48.062    Spinal deformity Q76.49    Anxiety about health F41.8    Cauda equina compression ([facility]) G83.4    Anemia, normocytic normochromic D64.9    Iron deficiency E61.1    Pseudomonas urinary tract infection N39.0, B96.5    Hypocalcemia E83.51    Cauda equina syndrome ([facility]) G83.4    Hemiplegia ([facility]) G81.90    Back pain M54.9    Incomplete quadriplegia at C5-6 level ([facility]) G82.54    [place] neck deformity of cervical spine M43.8X2    Acute cystitis without hematuria N30.00    Spinal cord injury, cervical region, sequela ([facility]) S14.109S    Elevated troponin R79.89    Fatigue R53.83    Decreased appetite R63.0    NSTEMI (non-ST elevated myocardial infarction) ([facility]) I21.4    Depression F32.A    Diastolic heart failure ([facility]) I50.30    Acute delirium R41.0    Quadriplegia, functional ([facility]) R53.2       Pain: 7/10    Cognitive Treatment    Treatment time: 2460-2839      Subjective: [x] Alert [x] Cooperative     [] Confused     [] Agitated    [] Lethargic      Objective/Assessment:  Attention: Pt. Requested repetition frequently    Orientation: \"Do I have to come back here later?\"    Recall: 4 word list- 
SPEECH LANGUAGE PATHOLOGY  Facility/Department: [facility] ACUTE REHAB  Initial Speech/Language/Cognitive Assessment    NAME: Rajani Iglesias  : 1960   MRN: [id number]  ADMISSION DATE: 2023  ADMITTING DIAGNOSIS: has Stenosis of cervical spine with myelopathy (HCC); Uncontrolled hypertension; Hypothyroidism; Lumbar radiculopathy, chronic; Lumbar neuritis; Post laminectomy syndrome; Hypokalemia; Pain of right hip joint; Post-menopausal osteoporosis; Chronic gastritis without bleeding; Elevated CEA; Esophageal dysphagia; Hypotension due to drugs; Ulcerative esophagitis; Weight loss, abnormal; Cervical myelopathy (HCC); Lumbar stenosis with neurogenic claudication; Spinal deformity; Anxiety about health; Cauda equina compression (HCC); Anemia, normocytic normochromic; Iron deficiency; Pseudomonas urinary tract infection; Hypocalcemia; Cauda equina syndrome (HCC); Hemiplegia (HCC); Back pain; Incomplete quadriplegia at C5-6 level (HCC); [place] neck deformity of cervical spine; Acute cystitis without hematuria; Spinal cord injury, cervical region, sequela (HCC); Elevated troponin; Fatigue; Decreased appetite; NSTEMI (non-ST elevated myocardial infarction) (HCC); Depression; Diastolic heart failure (HCC); Acute delirium; and Quadriplegia, functional ([facility]) on their problem list.    Date of Eval: 2023   Evaluating Therapist: ARIEL Silveira    RECENT RESULTS  CT OF HEAD/MRI: MRI Brain (12/15):  IMPRESSION:  No acute intracranial abnormality.     Mild parenchymal volume loss.     Mild chronic microvascular disease.    Primary Complaint: Per PM&R consult:   Referring Provider is requesting an evaluation for appropriate placement upon discharge from acute care. History from chart review and patient.     Rajani Iglesias is a 63 y.o. RHD female admitted to [facility] on 2023.       Patient with fatigue, poor appetite, trouble walking, and difficulty swallowing with associated weight loss. She has known history 
SPEECH LANGUAGE PATHOLOGY  Speech Language Pathology  [facility] ACUTE REHAB    Cognitive Treatment Note    Date: 12/23/2023  Patient’s Name: Rajani Iglesias  MRN: [id number]  Diagnosis:   Patient Active Problem List   Diagnosis Code    Stenosis of cervical spine with myelopathy ([facility]) M48.02, G99.2    Uncontrolled hypertension I10    Hypothyroidism E03.9    Lumbar radiculopathy, chronic M54.16    Lumbar neuritis M54.16    Post laminectomy syndrome M96.1    Hypokalemia E87.6    Pain of right hip joint M25.551    Post-menopausal osteoporosis M81.0    Chronic gastritis without bleeding K29.50    Elevated CEA R97.0    Esophageal dysphagia R13.19    Hypotension due to drugs I95.2    Ulcerative esophagitis K22.10    Weight loss, abnormal R63.4    Cervical myelopathy ([facility]) G95.9    Lumbar stenosis with neurogenic claudication M48.062    Spinal deformity Q76.49    Anxiety about health F41.8    Cauda equina compression ([facility]) G83.4    Anemia, normocytic normochromic D64.9    Iron deficiency E61.1    Pseudomonas urinary tract infection N39.0, B96.5    Hypocalcemia E83.51    Cauda equina syndrome ([facility]) G83.4    Hemiplegia ([facility]) G81.90    Back pain M54.9    Incomplete quadriplegia at C5-6 level ([facility]) G82.54    [place] neck deformity of cervical spine M43.8X2    Acute cystitis without hematuria N30.00    Spinal cord injury, cervical region, sequela ([facility]) S14.109S    Elevated troponin R79.89    Fatigue R53.83    Decreased appetite R63.0    NSTEMI (non-ST elevated myocardial infarction) ([facility]) I21.4    Depression F32.A    Diastolic heart failure ([facility]) I50.30    Acute delirium R41.0    Quadriplegia, functional ([facility]) R53.2       Pain: 0/10    Cognitive Treatment    Treatment time: 7125-6546      Subjective: [] Alert [] Cooperative     [x] Confused     [] Agitated    [x] Lethargic      Objective/Assessment:  Attention: Frequent repeated stimuli/cues for attention needed. Pt lethargic after recently waking.     Orientation: Decreased orientation to 
SPEECH LANGUAGE PATHOLOGY  Speech Language Pathology  [facility] ACUTE REHAB    Cognitive Treatment Note    Date: 12/25/2023  Patient’s Name: Rajani Iglesias  MRN: [id number]  Diagnosis:   Patient Active Problem List   Diagnosis Code    Stenosis of cervical spine with myelopathy (HCC) M48.02, G99.2    Uncontrolled hypertension I10    Hypothyroidism E03.9    Lumbar radiculopathy, chronic M54.16    Lumbar neuritis M54.16    Post laminectomy syndrome M96.1    Hypokalemia E87.6    Pain of right hip joint M25.551    Post-menopausal osteoporosis M81.0    Chronic gastritis without bleeding K29.50    Elevated CEA R97.0    Esophageal dysphagia R13.19    Hypotension due to drugs I95.2    Ulcerative esophagitis K22.10    Weight loss, abnormal R63.4    Cervical myelopathy (HCC) G95.9    Lumbar stenosis with neurogenic claudication M48.062    Spinal deformity Q76.49    Anxiety about health F41.8    Cauda equina compression ([facility]) G83.4    Anemia, normocytic normochromic D64.9    Iron deficiency E61.1    Pseudomonas urinary tract infection N39.0, B96.5    Hypocalcemia E83.51    Cauda equina syndrome ([facility]) G83.4    Hemiplegia ([facility]) G81.90    Back pain M54.9    Incomplete quadriplegia at C5-6 level ([facility]) G82.54    [place] neck deformity of cervical spine M43.8X2    Acute cystitis without hematuria N30.00    Spinal cord injury, cervical region, sequela ([facility]) S14.109S    Elevated troponin R79.89    Fatigue R53.83    Decreased appetite R63.0    NSTEMI (non-ST elevated myocardial infarction) ([facility]) I21.4    Depression F32.A    Diastolic heart failure ([facility]) I50.30    Acute delirium R41.0    Quadriplegia, functional ([facility]) R53.2    Abnormal CT of brain R90.89    Encephalopathy G93.40       Pain: 0/10    Cognitive Treatment    Treatment time: 1677-9814      Subjective: [x] Alert [x] Cooperative     [] Confused     [] Agitated    [] Lethargic      Objective/Assessment:  Attention: Pt demo significantly increased attention to task this date. Able to sustain 
SPEECH LANGUAGE PATHOLOGY  Speech Language Pathology  [facility] ACUTE REHAB    Cognitive Treatment Note    Date: 12/29/2023  Patient’s Name: Rajani Iglesias  MRN: [id number]  Diagnosis: Cognitive impairment  Patient Active Problem List   Diagnosis Code    Stenosis of cervical spine with myelopathy (HCC) M48.02, G99.2    Uncontrolled hypertension I10    Hypothyroidism E03.9    Lumbar radiculopathy, chronic M54.16    Lumbar neuritis M54.16    Post laminectomy syndrome M96.1    Hypokalemia E87.6    Pain of right hip joint M25.551    Post-menopausal osteoporosis M81.0    Chronic gastritis without bleeding K29.50    Elevated CEA R97.0    Esophageal dysphagia R13.19    Hypotension due to drugs I95.2    Ulcerative esophagitis K22.10    Weight loss, abnormal R63.4    Cervical myelopathy ([facility]) G95.9    Lumbar stenosis with neurogenic claudication M48.062    Spinal deformity Q76.49    Anxiety about health F41.8    Cauda equina compression ([facility]) G83.4    Anemia, normocytic normochromic D64.9    Iron deficiency E61.1    Pseudomonas urinary tract infection N39.0, B96.5    Hypocalcemia E83.51    Cauda equina syndrome ([facility]) G83.4    Hemiplegia ([facility]) G81.90    Back pain M54.9    Incomplete quadriplegia at C5-6 level ([facility]) G82.54    [place] neck deformity of cervical spine M43.8X2    Acute cystitis without hematuria N30.00    Spinal cord injury, cervical region, sequela ([facility]) S14.109S    Elevated troponin R79.89    Fatigue R53.83    Decreased appetite R63.0    NSTEMI (non-ST elevated myocardial infarction) ([facility]) I21.4    Depression F32.A    Diastolic heart failure ([facility]) I50.30    Delirium R41.0    Quadriplegia, functional ([facility]) R53.2    Abnormal CT of brain R90.89    Encephalopathy G93.40    Tetraparesis ([facility]) G82.50       Pain: 7/10, back  Cognitive Treatment    Treatment time: 6243-7602      Subjective: [x] Alert [x] Cooperative     [] Confused     [] Agitated    [] Lethargic      Objective/Assessment:  Attention: Attention sustained 
SPEECH LANGUAGE PATHOLOGY  Speech Language Pathology  [facility] ACUTE REHAB    Cognitive Treatment Note    Date: 12/30/2023  Patient’s Name: Rajani Iglesias  MRN: [id number]  Diagnosis: Cognitive impairment  Patient Active Problem List   Diagnosis Code    Stenosis of cervical spine with myelopathy (HCC) M48.02, G99.2    Uncontrolled hypertension I10    Hypothyroidism E03.9    Lumbar radiculopathy, chronic M54.16    Lumbar neuritis M54.16    Post laminectomy syndrome M96.1    Hypokalemia E87.6    Pain of right hip joint M25.551    Post-menopausal osteoporosis M81.0    Chronic gastritis without bleeding K29.50    Elevated CEA R97.0    Esophageal dysphagia R13.19    Hypotension due to drugs I95.2    Ulcerative esophagitis K22.10    Weight loss, abnormal R63.4    Cervical myelopathy ([facility]) G95.9    Lumbar stenosis with neurogenic claudication M48.062    Spinal deformity Q76.49    Anxiety about health F41.8    Cauda equina compression ([facility]) G83.4    Anemia, normocytic normochromic D64.9    Iron deficiency E61.1    Pseudomonas urinary tract infection N39.0, B96.5    Hypocalcemia E83.51    Cauda equina syndrome ([facility]) G83.4    Hemiplegia ([facility]) G81.90    Back pain M54.9    Incomplete quadriplegia at C5-6 level ([facility]) G82.54    [place] neck deformity of cervical spine M43.8X2    Acute cystitis without hematuria N30.00    Spinal cord injury, cervical region, sequela ([facility]) S14.109S    Elevated troponin R79.89    Fatigue R53.83    Decreased appetite R63.0    NSTEMI (non-ST elevated myocardial infarction) ([facility]) I21.4    Depression F32.A    Diastolic heart failure ([facility]) I50.30    Delirium R41.0    Quadriplegia, functional ([facility]) R53.2    Abnormal CT of brain R90.89    Encephalopathy G93.40    Tetraparesis ([facility]) G82.50       Pain: 5/10, back    Cognitive Treatment    Treatment time: 4809-5938      Subjective: [x] Alert [x] Cooperative     [] Confused     [] Agitated    [] Lethargic      Objective/Assessment:  Attention: Attention sustained 
SPEECH LANGUAGE PATHOLOGY  Speech Language Pathology  ST ACUTE REHAB    Cognitive Treatment Note    Date: 12/31/2023  Patient’s Name: Rajani Iglesias  MRN: [id number]  Diagnosis: Cognitive impairment  Patient Active Problem List   Diagnosis Code    Stenosis of cervical spine with myelopathy (HCC) M48.02, G99.2    Uncontrolled hypertension I10    Hypothyroidism E03.9    Lumbar radiculopathy, chronic M54.16    Lumbar neuritis M54.16    Post laminectomy syndrome M96.1    Hypokalemia E87.6    Pain of right hip joint M25.551    Post-menopausal osteoporosis M81.0    Chronic gastritis without bleeding K29.50    Elevated CEA R97.0    Esophageal dysphagia R13.19    Hypotension due to drugs I95.2    Ulcerative esophagitis K22.10    Weight loss, abnormal R63.4    Cervical myelopathy ([facility]) G95.9    Lumbar stenosis with neurogenic claudication M48.062    Spinal deformity Q76.49    Anxiety about health F41.8    Cauda equina compression ([facility]) G83.4    Anemia, normocytic normochromic D64.9    Iron deficiency E61.1    Pseudomonas urinary tract infection N39.0, B96.5    Hypocalcemia E83.51    Cauda equina syndrome ([facility]) G83.4    Hemiplegia ([facility]) G81.90    Back pain M54.9    Incomplete quadriplegia at C5-6 level ([facility]) G82.54    [place] neck deformity of cervical spine M43.8X2    Acute cystitis without hematuria N30.00    Spinal cord injury, cervical region, sequela ([facility]) S14.109S    Elevated troponin R79.89    Fatigue R53.83    Decreased appetite R63.0    NSTEMI (non-ST elevated myocardial infarction) ([facility]) I21.4    Depression F32.A    Diastolic heart failure ([facility]) I50.30    Delirium R41.0    Quadriplegia, functional ([facility]) R53.2    Abnormal CT of brain R90.89    Encephalopathy G93.40    Tetraparesis ([facility]) G82.50       Pain: 5/10, back    Cognitive Treatment    Treatment time: 9950-5726      Subjective: [x] Alert [x] Cooperative     [] Confused     [] Agitated    [] Lethargic      Objective/Assessment:  Attention: Attention sustained 
SPEECH LANGUAGE PATHOLOGY  Speech Language Pathology  [facility] ACUTE REHAB    Cognitive Treatment Note    Date: 1/2/2024  Patient’s Name: Rajani Iglesias  MRN: [id number]  Diagnosis: Cognitive impairment  Patient Active Problem List   Diagnosis Code    Stenosis of cervical spine with myelopathy (HCC) M48.02, G99.2    Uncontrolled hypertension I10    Hypothyroidism E03.9    Lumbar radiculopathy, chronic M54.16    Lumbar neuritis M54.16    Post laminectomy syndrome M96.1    Hypokalemia E87.6    Pain of right hip joint M25.551    Post-menopausal osteoporosis M81.0    Chronic gastritis without bleeding K29.50    Elevated CEA R97.0    Esophageal dysphagia R13.19    Hypotension due to drugs I95.2    Ulcerative esophagitis K22.10    Weight loss, abnormal R63.4    Cervical myelopathy ([facility]) G95.9    Lumbar stenosis with neurogenic claudication M48.062    Spinal deformity Q76.49    Anxiety about health F41.8    Cauda equina compression ([facility]) G83.4    Anemia, normocytic normochromic D64.9    Iron deficiency E61.1    Pseudomonas urinary tract infection N39.0, B96.5    Hypocalcemia E83.51    Cauda equina syndrome ([facility]) G83.4    Hemiplegia ([facility]) G81.90    Back pain M54.9    Incomplete quadriplegia at C5-6 level ([facility]) G82.54    [place] neck deformity of cervical spine M43.8X2    Acute cystitis without hematuria N30.00    Spinal cord injury, cervical region, sequela ([facility]) S14.109S    Elevated troponin R79.89    Fatigue R53.83    Decreased appetite R63.0    NSTEMI (non-ST elevated myocardial infarction) ([facility]) I21.4    Depression F32.A    Diastolic heart failure ([facility]) I50.30    Delirium R41.0    Quadriplegia, functional ([facility]) R53.2    Abnormal CT of brain R90.89    Encephalopathy G93.40    Tetraparesis ([facility]) G82.50    Cerebrovascular accident ([facility]) I63.9       Pain: none reported    Cognitive Treatment    Treatment time: 4307-6852      Subjective: [x] Alert [x] Cooperative     [] Confused     [] Agitated    [] 
[facility]  IN-PATIENT SERVICE  [facility]    PROGRESS NOTE             Date:   1/1/2024  Patient name:  Rajani Iglesias  Date of admission:  12/20/2023  8:59 PM  MRN:   [id number]  YOB: 1960    INTERVAL HISTORY:    SUBJECTIVE:  Doing well, currently denies concerns.  Has been working with therapy.    Objective   Vitals:    01/01/24 0803 01/01/24 0839 01/01/24 1510 01/01/24 1732   BP: 139/74  127/76 139/74   Pulse: 85  91 95   Resp:  18  18   Temp:    98.3 °F (36.8 °C)   TempSrc:    Oral   SpO2: 97%  99% 99%   Weight:       Height:         BP Readings from Last 6 Encounters:   01/01/24 139/74   12/20/23 132/72   10/09/23 125/70   07/03/23 138/84   04/17/23 129/77   03/24/23 128/70        No intake or output data in the 24 hours ending 01/01/24 1810  General appearance: poor nourished  HEENT: Normocephalic, no lesions, without obvious abnormality.pupils equal and reactive, EOM normal  Lungs: clear to auscultation bilaterally  Heart: regular rate and rhythm, S1, S2 normal, no murmur, click, rub or gallop  Abdomen: soft, non-tender; bowel sounds normal; no masses,  no organomegaly  Extremities: extremities normal, atraumatic, no cyanosis or edema. Pulses 2+ and symmetrical in all 4 extremities    CBC: No results for input(s): \"WBC\", \"HGB\", \"PLT\" in the last 72 hours.  BMP:  No results for input(s): \"NA\", \"K\", \"CL\", \"CO2\", \"BUN\", \"CREATININE\", \"CALCIUM\" in the last 72 hours.  Magnesium:No results for input(s): \"MG\" in the last 72 hours.  No results for input(s): \"AST\", \"ALT\", \"ALB\", \"TP\" in the last 72 hours.    Invalid input(s): \"ALP\", \"TBIL\", \"BILID\"  Phosphorus:No results for input(s): \"PHOS\" in the last 72 hours.  Glucose:No results for input(s): \"POCGLU\" in the last 72 hours.    IMAGING  Reviewed    MICRO   [unfilled]       SCHEDULED MEDICATIONS PRN MEDICATIONS   carvedilol, 6.25 mg, BID WC  lisinopril, 5 mg, Daily  thiamine mononitrate, 100 
[facility]   Acute Rehabilitation Occupational Therapy Daily Treatment Note    Date: 23  Patient Name: Rajani Iglesias       Room: 2626/2626-01  MRN: [id number]  Account: [id number]   : 1960  (63 y.o.) Gender: female       Referring Practitioner: Christy Lynn MD  Diagnosis: Functional quadriplegia with recent decline in mobility and ADLs  Additional Pertinent Hx: Rajani Iglesias is a 63 y.o. RHD female admitted to [facility] on 2023.       Patient with fatigue, poor appetite, trouble walking, and difficulty swallowing with associated weight loss. She has known history cauda equina syndrome and chronic back pain. Cardiology: consulted for elevated troponin, likely type II. Added coreg to Cardizem and lisinopril. Checked 2D Echo. Results below. Cardiology signed off after low risk echo with noted diastolic dysfunction. For outpatient follow up 2-4 weeks to consider outpatient stress test.      Psychiatry: evaluated for depression unspecified. Continued Buspirone and added Zoloft.      Neurology: she had recent eval by Dr. Ludwig in neurosurgery who noted her functional had improved and she was ambulating with walker after requiring wheelchair for progressive quadriparesis. She has known history cervical myelopathy that was surgically decompressed and lumbar spine stenosis treated with laminectomy. Delirium had improved. MRI brain was negative for acute changes. Some question about adverse effects of medications including concomitant use of Norco and methadone. Internal medicine discontinued the [place]. Pt admitted to ARU on 23.    Treatment Diagnosis: impaired self care status    Past Medical History:  has a past medical history of Anxiety, Arthritis, At maximum risk for fall, Breast cancer (HCC), Cancer (HCC), Cauda equina syndrome (HCC), Cervical stenosis of spine, GERD (gastroesophageal reflux disease), History of stomach ulcers, History of therapeutic radiation, 
Tele sitter discontinued per Dr. Ring. Patient in recliner with chair alarm engaged. Writer educated patent on using call light, safety, fall risk.   
[facility]   Acute Rehabilitation Occupational Therapy Daily Treatment Note    Date: 23  Patient Name: Rajani Iglesias       Room: 2626/2626-01  MRN: [id number]  Account: [id number]   : 1960  (63 y.o.) Gender: female       Referring Practitioner: Christy Lynn MD  Diagnosis: Functional quadriplegia with recent decline in mobility and ADLs  Additional Pertinent Hx: Rajani Iglesias is a 63 y.o. RHD female admitted to [facility] on 2023.       Patient with fatigue, poor appetite, trouble walking, and difficulty swallowing with associated weight loss. She has known history cauda equina syndrome and chronic back pain. Cardiology: consulted for elevated troponin, likely type II. Added coreg to Cardizem and lisinopril. Checked 2D Echo. Results below. Cardiology signed off after low risk echo with noted diastolic dysfunction. For outpatient follow up 2-4 weeks to consider outpatient stress test.      Psychiatry: evaluated for depression unspecified. Continued Buspirone and added Zoloft.      Neurology: she had recent eval by Dr. Ludwig in neurosurgery who noted her functional had improved and she was ambulating with walker after requiring wheelchair for progressive quadriparesis. She has known history cervical myelopathy that was surgically decompressed and lumbar spine stenosis treated with laminectomy. Delirium had improved. MRI brain was negative for acute changes. Some question about adverse effects of medications including concomitant use of Norco and methadone. Internal medicine discontinued the [place]. Pt admitted to ARU on 23.    Treatment Diagnosis: impaired self care status    Past Medical History:  has a past medical history of Anxiety, Arthritis, At maximum risk for fall, Breast cancer (HCC), Cancer (HCC), Cauda equina syndrome (HCC), Cervical stenosis of spine, GERD (gastroesophageal reflux disease), History of stomach ulcers, History of therapeutic radiation, 
[facility]   Acute Rehabilitation Occupational Therapy Daily Treatment Note    Date: 23  Patient Name: Rajani Iglesias       Room: 2626/2626-01  MRN: [id number]  Account: [id number]   : 1960  (63 y.o.) Gender: female       Referring Practitioner: Christy Lynn MD  Diagnosis: Functional quadriplegia with recent decline in mobility and ADLs  Additional Pertinent Hx: Rajani Iglesias is a 63 y.o. RHD female admitted to [facility] on 2023.       Patient with fatigue, poor appetite, trouble walking, and difficulty swallowing with associated weight loss. She has known history cauda equina syndrome and chronic back pain. Cardiology: consulted for elevated troponin, likely type II. Added coreg to Cardizem and lisinopril. Checked 2D Echo. Results below. Cardiology signed off after low risk echo with noted diastolic dysfunction. For outpatient follow up 2-4 weeks to consider outpatient stress test.      Psychiatry: evaluated for depression unspecified. Continued Buspirone and added Zoloft.      Neurology: she had recent eval by Dr. Ludwig in neurosurgery who noted her functional had improved and she was ambulating with walker after requiring wheelchair for progressive quadriparesis. She has known history cervical myelopathy that was surgically decompressed and lumbar spine stenosis treated with laminectomy. Delirium had improved. MRI brain was negative for acute changes. Some question about adverse effects of medications including concomitant use of Norco and methadone. Internal medicine discontinued the [place]. Pt admitted to ARU on 23.    Treatment Diagnosis: impaired self care status    Past Medical History:  has a past medical history of Anxiety, Arthritis, At maximum risk for fall, Breast cancer (HCC), Cancer (HCC), Cauda equina syndrome (HCC), Cervical stenosis of spine, GERD (gastroesophageal reflux disease), History of stomach ulcers, History of therapeutic radiation, 
Your patient currently has an order for Lovenox 40 mg daily for DVT prophylaxis. Based upon their weight of 50 kg and CrCl of 73 mL/min, the recommended dose should be Lovenox 30 mg  SubQ daily. Thank you.     Bessie ZavalaD, BCPS 12/28/2023 11:34 AM    
Attention sustained throughout, distractions in room minimized    Orientation: Pt awake and alert x 4    Recall: 3 word mental manipulation/working memory task: 80% accuracy. Pt ed provided re use of smart phone apps to facilitate recall. Pt verbalized understanding.     Organization: Generative naming (category exclusion task): 93% (I) improved to 100% mod A.     Problem Solving/Reasoning: Pill organizer activity: Pt identifying medication mistakes with 80% accuracy, min cues.     Other: No family present during session    Plan:  [x] Continue ST services    [] Discharge from ST:      Discharge recommendations:  [x] Further therapy recommended at discharge.   [] No therapy recommended at discharge.      Treatment completed by: Sam Joseph M.A., CCC-SLP     
safely demo's curb step x2 with family present, completed safely without LOB             PT Exercises  Exercise Treatment: Seated neck/ Bshoulder gentle stretching with edu on 20-30 sec holds.  Resistive Exercises: Seated B LE ex's x20+ with #2 and LGTB      ASSESSMENT/PROGRESS TOWARDS GOALS       Assessment  Activity Tolerance: Patient tolerated treatment well  Discharge Recommendations: Continue to assess pending progress  PT Equipment Recommendations  Equipment Needed: No    Goals  Patient Goals   Patient Goals : to get stronger  Short Term Goals  Time Frame for Short Term Goals: 6 days  Short Term Goal 1: Pt to perform supine to sit with HOB flat with Supervision  Short Term Goal 2: pt to transfer STS with WW with Min A x 3 reps from chair/WC  Short Term Goal 3: pt to ambulate 40 ft with WW c Min A with no LOB  Short Term Goal 4: pt to tolerate standing task requring dynamic balance with Min A  Short Term Goal 5: pt to perform seated exercise program with SBA  Short Term Goal 6: pt to demonstrate transfers sit <> stand w/ min x 2 advancing to wheeled walker when medically ready  Short Term Goal 7: pt to demonstrate good standing balance w/ knee control on the Nathalie Stedy and increase standing tolerance to 3 minutes  Short Term Goal 8: pt to advance to and demonstrate gait w/ wheeled walker 10-20' w/ min x 2 and W/C follow after demonstrating knee control on the Nathalie Stedy  Long Term Goals  Time Frame for Long Term Goals : 14 days  Long Term Goal 1: pt to perform transfers c WW c SBA  Long Term Goal 2: pt to ambulate 75ft c WW c SBA  Long Term Goal 3: pt to wheel WC 50 ft c turns c Supervision  Long Term Goal 4: pt to improve 10 Meter walk test time by 15 seconds  Long Term Goal 5: pt to improve functional reach test to greater than 10 inches  Long term goal 6: pt to ascend and descend 1 stair c no railing, c Min A    PLAN OF CARE/SAFETY  Physical Therapy Plan  General Plan:  minutes of therapy at least 5 
Dressing  Assistance Level: Set-up  Skilled Clinical Factors: Able to doff and ronny UB clothing without difficulty.    Lower Extremity Dressing  Assistance Level: Stand by assist  Skilled Clinical Factors: Able to unthread and thread pants with increased time while seated, SBA provided during clothing management with intermittent CGA to ensure balance- alternates use of each UE while using rw for unilateral support, able to demo short durations of unsupported activity.    Putting On/Taking Off Footwear  Assistance Level: Minimal assistance  Skilled Clinical Factors: Pt. is able to doff TEDs with increased time. Requires assist for donning. Able to doff/ronny socks and shoes without difficulty.    Toileting  Assistance Level: Stand by assist  Skilled Clinical Factors: SBA during clothing management with interrmittent CGA to ensure balance. Able to complete hygiene after voiding and BM by weight shifting while seated.    Toilet Transfers  Technique:  (Ambulating with rw.)  Equipment: Standard toilet;Grab bars  Assistance Level: Stand by assist  Skilled Clinical Factors: Controlled descend demo'd this date. G use of grab bar.    Tub/Shower Transfers  Type: Shower  Transfer From: Rolling walker  Transfer To: Tub transfer bench  Assistance Level: Stand by assist  Skilled Clinical Factors: No LOB while crossing threshold with rw.    Mobility     Sit to Stand  Assistance Level: Stand by assist  Skilled Clinical Factors: Occasional cuing for hand placement.  Stand to Sit  Assistance Level: Stand by assist  Skilled Clinical Factors: Occasional cuing for hand placement and controlled descend.    Functional Mobility  Device: Rolling walker  Activity: To/From bathroom;To/From therapy gym  Assistance Level: Stand by assist  Skilled Clinical Factors: Pt becoming more steady each day. SBA provided during mobility to/from bathroom without LOB. Pt. able to complete full distance from room <> therapy gym with use of rw and SBA. No 
Plan:  minutes of therapy at least 5 out of 7 days a week  Safety Devices  Type of Devices: Call light within reach;Left in chair;Patient at risk for falls;Gait belt;; chair alarm   Restraints  Restraints Initially in Place: No    EDUCATION  Education  Education Given To: Patient  Education Provided Comments: Providing self stretches to B LE        Therapy Time     12/31/23 0800 12/31/23 1555   PT Individual Minutes   Time In 0800 1332   Time Out 0900 1411   Minutes 60 39               Ekaterina Barker, CHRIS, 12/31/23 at 3:56 PM           
Transportation: SUV  Occupation: Retired  Type of Occupation: [employer]  Leisure & Hobbies: \"I can't do the fun things I used to because of mobility\" Per daughter, pts expresses no leisure interests.  IADL Comments: Pt sleeps in a recliner chair. Pt reports carpet junior throughout home, linolium floor in bathroom.  Additional Comments: Pt is pleasantly confused, Above information obtained and verified from pts daughter. Spouse works 5:30a-4pm M-F, pt is home alone while he works. Per pts daughter, spouse is planning to retire the first of the year. Pt would be able to have 24 hr A, daughter reports pts grandsons would be able to stay with pt. Daughter reports spouse has cognitive barries, possible dementia but expressed no concerns with him taking care of pt. Pt goes to [facility] pain clinic, 1x per month.      OBJECTIVE  Vision  Vision: Impaired  Vision Exceptions: Wears glasses for reading    Hearing  Hearing: Within functional limits     Strength  Strength RUE  Comment: see OT for UE assessment    Quality of Movement  Coordination  Movements Are Fluid And Coordinated: No  Coordination and Movement description: slightly decreased fluidity of movement in UE And LE  Motor Control  Gross Motor?: WFL    Sensation  Overall Sensation Status: Impaired (N/T in B hands and BLEs)    Functional Mobility  Bed mobility  Bridging: Stand by assistance  Rolling to Left: Stand by assistance  Rolling to Right: Stand by assistance  Supine to Sit: Stand by assistance  Sit to Supine: Stand by assistance  Scooting: Stand by assistance (has a posterior lean with scooting, requires increased time for mobility and cues for hand placement)  Bed Mobility Comments: pt uses bed rails and is with posterior lean/LOB and flexed posture in sitting edge of bed- support from upper handrail for stability. extra time to correct posture.  Transfers  Sit to Stand: Contact guard assistance  Stand to Sit: Contact guard assistance  Bed to Chair: Contact 
air  O2 Device: None (Room air)  Communication Observation: Functional  Follows Directions: Complex  Dentition: Dentures top;Dentures bottom  Patient Positioning: Upright in chair  Baseline Vocal Quality: Normal  Prior Dysphagia History: MBS completed 12/14 - Easy to chew diet with thin liquids was recommended  Consistencies Administered: Soft and Bite-Sized;Thin - cup;Thin - straw        Oral Motor Deficits  Labial: No impairment  Dentition: Upper dentures;Lower dentures  Oral Hygiene: Moist;Clean  Lingual: No impairment  Consistencies Administered: Soft and Bite-Sized;Thin - cup;Thin - straw    Oral Phase Dysfunction  Oral Phase  Oral Phase - Comment: Mildly prolonged mastication with soft and bite-sized solid, with additional time, able to functionally breakdown bolus and clear oral cavity.     Indicators of Pharyngeal Phase Dysfunction   Pharyngeal Phase  Pharyngeal Phase: WNL  Indicators of Pharyngeal Phase Dysfunction  Pharyngeal Phase Comment: No overt s/s of aspiration observed with consistencies tested.  Pharyngeal Phase   Pharyngeal Phase: WNL    Prognosis  Prognosis: Fair  Prognosis Considerations: Severity of Impairments  Consulted and agree with results and recommendations: Patient;RN    Education  Patient Education: Pt education provided re: results and recommendations of assessment and ST goals.  Pt verbalized understanding, but needs reinforcement.  Patient Education Response: Needs reinforcement             Therapy Time  SLP Individual Minutes  Time In: 0950  Time Out: 1005  Minutes: 15          Catina Patel M.A., CCC-SLP  12/21/2023 10:47 AM  
demonstrate improved safety but constant tactile/verbal cues (simple words). Patient demonstrates distraction with hallucinations and needed redirection and refocused on task given. Use of \"making bed\" and adjusting items left in bed as method to improve forward reaching in standing to correct posturing to prevent posterior LOB. STS bed <> recliner<>wc multiple transitions to demonstrate repeative muscle memory training. Techniques training reinforced with spouse present during am session    Environmental Mobility  Ambulation  Surface: Level tile  Device: Rolling Walker  Other Apparatus: Wheelchair follow (follow provided by spouse Emmett)  Assistance: Minimal assistance;Contact guard assistance  Gait Deviations: Slow Sharon;Decreased step length;Decreased step height;Deviated path  Distance: 96 feet spouse assisting following with wc d/t fall risk and fatigue previously noted in sessions  Comments: focused on postural training 40 minutes prior to ambulation in am. Note level of assist decreased with this session.  Stairs/Curb  Stairs?: No  Propulsion 1  Propulsion: Manual  Level: Level Tile  Method: RUE;LUE;RLE;LLE (alternating use to patient tolerance)  Level of Assistance: Stand by assistance  Description/ Details: increased time required, patient with decreased processing coordination of UE/LE mobility. and alternating extremities.  Distance: 3 short rest breaks taken in 50 ft    PT Exercises  Exercise Treatment: am; donning pants, socks and shoes; Performed STSs and postural correct/stability with standing and seated activities 40 minutes. Activities involved forward and lateral reaching. seated therapeutic rest breaks given. patient with hallucinations and need extra time to reinorient and refocus to activities given.  A/AROM Exercises: Seated BLE exs x 15reps  Resistive Exercises: Seated orange band  x 20 reps  Static Sitting Balance Exercises: Dangle EOB 10 lmin.  Dynamic Sitting Balance Exercises: Seated EOB 
tablet 650 mg, 650 mg, Oral, Q4H PRN  polyethylene glycol (GLYCOLAX) packet 17 g, 17 g, Oral, Daily  senna (SENOKOT) tablet 17.2 mg, 2 tablet, Oral, Daily PRN  bisacodyl (DULCOLAX) suppository 10 mg, 10 mg, Rectal, Daily PRN  aspirin EC tablet 81 mg, 81 mg, Oral, Daily  busPIRone (BUSPAR) tablet 30 mg, 30 mg, Oral, BID  dilTIAZem (CARDIZEM) tablet 60 mg, 60 mg, Oral, 3 times per day  levothyroxine (SYNTHROID) tablet 100 mcg, 100 mcg, Oral, Daily  methadone (DOLOPHINE) tablet 10 mg, 10 mg, Oral, BID  nicotine (NICODERM CQ) 7 MG/24HR 1 patch, 1 patch, TransDERmal, Daily  pantoprazole (PROTONIX) tablet 40 mg, 40 mg, Oral, Daily  polyethylene glycol (GLYCOLAX) packet 17 g, 17 g, Oral, Daily PRN    Objective:  /74   Pulse 85   Temp 98.8 °F (37.1 °C) (Oral)   Resp 18   Ht 1.549 m (5' 1\")   Wt 49.3 kg (108 lb 11.2 oz)   SpO2 97%   BMI 20.54 kg/m²       GEN: Well developed, well nourished, no acute distress  HEENT: Normocephalic, atraumatic.  EOM grossly intact.  Hearing grossly intact.  Mucous membranes pink and moist.  RESP: Normal breath sounds with no wheezing, rales, or rhonchi. Respirations WNL and unlabored.  CV: Regular rate and rhythm. No murmurs, rubs, or gallops.  ABD: Soft, non-distended, bowel sounds present and equal.  NEURO: Alert.  Speech fluent.  Sensation to light touch intact.  MSK: Muscle bulk is normal bilaterally. Strength 4+/5 in all limbs.  LIMBS: No edema in bilateral lower limbs.  SKIN: Warm and dry with good turgor.  PSYCH: Mood WNL. Affect WNL. Appropriately interactive.    Diagnostics:     CBC:   No results for input(s): \"WBC\", \"RBC\", \"HGB\", \"HCT\", \"MCV\", \"RDW\", \"PLT\" in the last 72 hours.    BMP:   No results for input(s): \"NA\", \"K\", \"CL\", \"CO2\", \"PHOS\", \"BUN\", \"CREATININE\", \"CA\", \"GLUCOSE\" in the last 72 hours.    BNP: No results for input(s): \"BNP\" in the last 72 hours.  PT/INR: No results for input(s): \"PROTIME\", \"INR\" in the last 72 hours.  APTT: No results for input(s): \"APTT\" 
threshold with rw.        Speech Therapy  Subjective: [x] Alert     [x] Cooperative     [] Confused     [] Agitated    [] Lethargic        Objective/Assessment:  Attention: Attention sustained throughout, distractions in room minimized     Recall: 4 word attribute inclusion- 100%.  4 word list- odd item out- 88%, 100%  c cues.    Image retention following 10 minute delay- 80%.  Paragraph recall- 95%. .       Organization: n/a      Problem Solving/Reasoning: n/a     Other: No family present during session.        Current Medications:   Current Facility-Administered Medications: [START ON 1/1/2024] carvedilol (COREG) tablet 6.25 mg, 6.25 mg, Oral, BID WC  lisinopril (PRINIVIL;ZESTRIL) tablet 5 mg, 5 mg, Oral, Daily  thiamine mononitrate tablet 100 mg, 100 mg, Oral, Daily  enoxaparin Sodium (LOVENOX) injection 30 mg, 30 mg, SubCUTAneous, Daily  atorvastatin (LIPITOR) tablet 40 mg, 40 mg, Oral, Nightly  folic acid (FOLVITE) tablet 1 mg, 1 mg, Oral, Daily  QUEtiapine (SEROQUEL) tablet 12.5 mg, 12.5 mg, Oral, Nightly  QUEtiapine (SEROQUEL) tablet 6.25 mg, 6.25 mg, Oral, Nightly PRN  docusate sodium (COLACE) capsule 100 mg, 100 mg, Oral, BID  acetaminophen (TYLENOL) tablet 650 mg, 650 mg, Oral, Q4H PRN  polyethylene glycol (GLYCOLAX) packet 17 g, 17 g, Oral, Daily  senna (SENOKOT) tablet 17.2 mg, 2 tablet, Oral, Daily PRN  bisacodyl (DULCOLAX) suppository 10 mg, 10 mg, Rectal, Daily PRN  aspirin EC tablet 81 mg, 81 mg, Oral, Daily  busPIRone (BUSPAR) tablet 30 mg, 30 mg, Oral, BID  dilTIAZem (CARDIZEM) tablet 60 mg, 60 mg, Oral, 3 times per day  levothyroxine (SYNTHROID) tablet 100 mcg, 100 mcg, Oral, Daily  methadone (DOLOPHINE) tablet 10 mg, 10 mg, Oral, BID  nicotine (NICODERM CQ) 7 MG/24HR 1 patch, 1 patch, TransDERmal, Daily  pantoprazole (PROTONIX) tablet 40 mg, 40 mg, Oral, Daily  polyethylene glycol (GLYCOLAX) packet 17 g, 17 g, Oral, Daily PRN    Objective:  BP (!) 154/68   Pulse 86   Temp 99.5 °F (37.5 °C) 
Dependent  Skilled Clinical Factors: TA for footies    Toileting  Assistance Level: Supervision  Skilled Clinical Factors: Able to complete clothing management and juanita/buttocks hygiene without assist.    Toilet Transfers  Equipment: Standard toilet;Grab bars  Assistance Level: Minimal assistance  Skilled Clinical Factors: amb with RW, verbal and tactile cues for use of GB, p safety awareness    Tub/Shower Transfers  Type:  (Ambulating with rw)  Transfer From: Rolling walker  Transfer To: Tub transfer bench  Assistance Level: Maximum assistance  Skilled Clinical Factors: Before shower, pt amb across threshold with RW with SBA for cuing for hand placement. After shower, pt req max verbal and tactile cues to perform SPT for safety d/t confusion and agitation.    Light Housekeeping  Light Housekeeping Level: Wheelchair  Light Housekeeping Level of Assistance: Supervision  Light Housekeeping: PM: Writer instructed pt engagment in folding clothes to promote participation and pt engagement in IADLs. Pt folded 5 shirts and 2 towels with pleasure.    Mobility    Sit to Stand  Assistance Level: Minimal assistance  Skilled Clinical Factors: Min/CGA. Mod verbal cuing for hand placement.  Stand to Sit  Assistance Level: Minimal assistance  Skilled Clinical Factors: Verbal cuing for hand placement and controlled descend.    Functional Mobility  Device: Wheelchair  Activity: To/From therapy gym  Assistance Level: Minimal assistance  Skilled Clinical Factors: PM: Pt amb in w/c 1/2 way down [address] way before req RB.    Assessment  Assessment  Activity Tolerance: Patient limited by fatigue and cognition  Discharge Recommendations: Continue to assess pending progress    Patient Education  Education  Education Given To: Patient  Education Provided: Role of Therapy;Plan of Care;Safety;ADL Function;Mobility Training;Transfer Training  Education Method: Verbal;Demonstration  Barriers to Learning: Cognition  Education Outcome: Verbalized 
limited by fatigue  Activity Tolerance Comments: pt was with good tolerance this session though did require rest breaks throughout          GOALS  Patient Goals   Patient Goals : to get stronger  Short Term Goals  Time Frame for Short Term Goals: 6 days  Short Term Goal 1: Pt to perform supine to sit with HOB flat with Supervision  Short Term Goal 2: pt to transfer STS with WW with Min A x 3 reps from chair/WC  Short Term Goal 3: pt to ambulate 40 ft with WW c Min A with no LOB  Short Term Goal 4: pt to tolerate standing task requring dynamic balance with Min A  Short Term Goal 5: pt to perform seated exercise program with SBA  Short Term Goal 6: pt to demonstrate transfers sit <> stand w/ min x 2 advancing to wheeled walker when medically ready  Short Term Goal 7: pt to demonstrate good standing balance w/ knee control on the Nathalie Stedy and increase standing tolerance to 3 minutes  Short Term Goal 8: pt to advance to and demonstrate gait w/ wheeled walker 10-20' w/ min x 2 and W/C follow after demonstrating knee control on the Nathalie Stedy  Long Term Goals  Time Frame for Long Term Goals : 14 days  Long Term Goal 1: pt to perform transfers c WW c SBA  Long Term Goal 2: pt to ambulate 75ft c WW c SBA  Long Term Goal 3: pt to wheel WC 50 ft c turns c Supervision  Long Term Goal 4: pt to improve 10 Meter walk test time by 15 seconds  Long Term Goal 5: pt to improve functional reach test to greater than 10 inches  Long term goal 6: pt to ascend and descend 1 stair c no railing, c Min A    PLAN OF CARE  Frequency: 1-2 treatment sessions per day, 5-7 days per week  Safety Devices  Type of Devices: Call light within reach;Chair alarm in place;Gait belt;Patient at risk for falls;Left in chair    EDUCATION  Education  Education Given To: Patient;Family (spouse Emmett)  Education Provided: Mobility Training;Transfer Training;Fall Prevention Strategies;Safety;Precautions;DME/Home Modifications;Equipment;Family 
req RB.  Motor Control/Coordination: AM: Writer faciliated pt engagement in placing small colored blocks into slots to promote FM coordination to perform functional/self care tasks with max independence. Pt req down grading activity by completing only one kind of colored block with verbal/tactile cues to attend to task. Pt completed task with increased time.    Assessment  Assessment  Activity Tolerance: Patient limited by fatigue  Discharge Recommendations: Continue to assess pending progress    Patient Education  Education  Education Given To: Patient  Education Provided: Role of Therapy;Plan of Care;Safety;ADL Function;Mobility Training;Transfer Training  Education Method: Verbal;Demonstration  Barriers to Learning: Cognition  Education Outcome: Verbalized understanding;Demonstrated understanding;Continued education needed    OT Equipment Recommendations  Other: TBD    Safety Devices  Safety Devices in place: Yes  Type of devices: Chair alarm in place;Left in chair (Family present in room upon writer's departure)       Goals  Patient Goals   Patient goals : \"to be able to do like I was prior to my spinal injury\" pt states in regard to ARU goal  Short Term Goals  Time Frame for Short Term Goals: 5-7 days  Short Term Goal 1: Pt will perform oral hygiene/grooming tasks with Set-up and Good attention to task  Short Term Goal 2: Pt will perform UB bathing/dressing SBA with Good attention to task and use of AE/modified techniques as needed  Short Term Goal 3: Pt will perform LB bathing/dressing Mod A with Good attention to task and use of AE/modified techniques as needed  Short Term Goal 4: Pt will perform functional transfers/mobility Min A during self-care tasks with Good safety and use of least restrictive device  Short Term Goal 5: Pt will tolerate standing for 3+ minutes Min A during functional activity of choice with Good safety to improve balance/tolerance for self-care and mobility  Short Term Goal 6: Pt/family 
Mod A with Good attention to task and use of AE/modified techniques as needed  Short Term Goal 4: Pt will perform functional transfers/mobility Min A during self-care tasks with Good safety and use of least restrictive device  Short Term Goal 5: Pt will tolerate standing for 3+ minutes Min A during functional activity of choice with Good safety to improve balance/tolerance for self-care and mobility  Short Term Goal 6: Pt/family will verbalize/demonstrate Good understanding of home safety/fall preventiont techniques to promote safety and independence will daily activities  Short Term Goal 7: Pt will actively participate in 30+ minutes of therapeutic exercise/functional activity/social participation to improve strength and activity tolerance for self-care and improve overall quality of life  Short Term Goal 8: Pt/family educated on and explore use of DME/AE/modified techniques to promote ease and independence with self-care and mobility    Long Term Goals  Time Frame for Long Term Goals : By discharge  Long Term Goal 1: Pt will perform UB bathing/dressing with Set-up and Good attention to task with use of AE/modified techniques as needed  Long Term Goal 2: Pt will perform LB dressing/bathing SBA with Good attention to task and use of AE/modified techniques as needed  Long Term Goal 3: Pt will perform functional transfers/mobility SBA during self-care tasks with Good safety and use of least restrictive device  Long Term Goal 4: Pt will tolerate standing 7+ minutes during functional activity of choice SBA with Good safety to improve balance/tolerance for self-care and mobility  Long Term Goal 5: Pt will perform tub transfer CGA with Good safety and use of appropriate AE/DME as needed  Long Term Goal 6: Pt will verbalize/demonstrate 2-3 positive coping strategies to manage emotions/anxiety to promote maximal participation in self-care and functional tasks  Long Term Goal 7: Pt will identifty 2-3 leisure activities to 
Set-up and Good attention to task  Short Term Goal 2: Pt will perform UB bathing/dressing SBA with Good attention to task and use of AE/modified techniques as needed  Short Term Goal 3: Pt will perform LB bathing/dressing Mod A with Good attention to task and use of AE/modified techniques as needed  Short Term Goal 4: Pt will perform functional transfers/mobility Min A during self-care tasks with Good safety and use of least restrictive device  Short Term Goal 5: Pt will tolerate standing for 3+ minutes Min A during functional activity of choice with Good safety to improve balance/tolerance for self-care and mobility  Short Term Goal 6: Pt/family will verbalize/demonstrate Good understanding of home safety/fall preventiont techniques to promote safety and independence will daily activities  Short Term Goal 7: Pt will actively participate in 30+ minutes of therapeutic exercise/functional activity/social participation to improve strength and activity tolerance for self-care and improve overall quality of life  Short Term Goal 8: Pt/family educated on and explore use of DME/AE/modified techniques to promote ease and independence with self-care and mobility    Long Term Goals  Time Frame for Long Term Goals : By discharge  Long Term Goal 1: Pt will perform UB bathing/dressing with Set-up and Good attention to task with use of AE/modified techniques as needed  Long Term Goal 2: Pt will perform LB dressing/bathing SBA with Good attention to task and use of AE/modified techniques as needed  Long Term Goal 3: Pt will perform functional transfers/mobility SBA during self-care tasks with Good safety and use of least restrictive device  Long Term Goal 4: Pt will tolerate standing 7+ minutes during functional activity of choice SBA with Good safety to improve balance/tolerance for self-care and mobility  Long Term Goal 5: Pt will perform tub transfer CGA with Good safety and use of appropriate AE/DME as needed  Long Term Goal 
3.8 on 12/27, improved.  Monitoring.  Hyponatremia:  Sodium 134 on 12/27, stable.  Monitoring.  Sertraline discontinued in acute care.  HTN:  On carvedilol, diltiazem, lisinopril  Hypothyroidism:  On levothyroxine  GERD:  On protonix  Depression:  On buspar.  Sertraline discontinued in acute care due to hyponatremia.  Chronic pain:  On methadone  Failure to thrive, moderate malnutrition:  Dietician following. On oral nutrition supplements.  Bowel Management: Miralax daily, senokot prn, dulcolax prn.  DVT prophylaxis:  [facility]  Internal Medicine for medical management  Follow up PCP 1-2 weeks, Dr. Lynn      Electronically signed by Pilar Ring MD on 12/28/2023 at 10:45 PM      
Per Day: Once a day  Current Treatment Recommendations: Strengthening, Balance training, Functional mobility training, Endurance training, Pain management, Safety education & training, Patient/Caregiver education & training, Equipment evaluation, education, & procurement, Positioning, Self-Care / ADL, Home management training, Cognitive/Perceptual training, Coordination training       12/28/23 0738 12/28/23 1553   OT Individual Minutes   Time In 1105 1300   Time Out 1200 1330   Minutes 55 30       Electronically signed by GOOD Carrasco on 12/28/23 at 3:53 PM EST   
Lipitor continue aspirin  Anemia -hemoglobin decreased to 8.2, stable from yesterday., defer to internal medicine, continue to monitor  Hypothyroidism: on levothyroxine  Hypokalemia: IM following,  potassium 3.6-supplemented  Hypomagnesemia:  IM following , magnesium 1.8.  HTN: on cardizem, carvedilol, lisinopril  GERD: on pantoprazole  Spasticity: follows with - Dr Lynn as outpatient - planning for Botox. On baclofen.   Hx lumbar stenosis/cauda equina syndrome: s/p lumbar laminectomy.   Bowel Management: Miralax daily, senokot prn, dulcolax prn.  DVT Prophylaxis:  low molecular weight heparin, SCD's while in bed, and HERMINIO's during the day  Internal medicine for medical management        Marlo Brower MD       This note is created with the assistance of a speech recognition program.  While intending to generate a document that actually reflects the content of the visit, the document can still have some errors including those of syntax and sound a like substitutions which may escape proof reading.  In such instances, actual meaning can be extrapolated by contextual diversion

## 2024-01-03 ENCOUNTER — CARE COORDINATION (OUTPATIENT)
Dept: CASE MANAGEMENT | Age: 64
End: 2024-01-03

## 2024-01-04 ENCOUNTER — CARE COORDINATION (OUTPATIENT)
Dept: CASE MANAGEMENT | Age: 64
End: 2024-01-04

## 2024-01-04 DIAGNOSIS — G82.50 TETRAPARESIS (HCC): Primary | ICD-10-CM

## 2024-01-04 NOTE — CARE COORDINATION
Care Transitions Initial Follow Up Call    Call within 2 business days of discharge: Yes    Patient Current Location:  Home: [address] [address]    Care Transition Nurse contacted the patient by telephone to perform post hospital discharge assessment. Verified name and  with patient as identifiers. Provided introduction to self, and explanation of the Care Transition Nurse role.     Patient: Rajani Iglesias Patient : 1960   MRN: [id number]  Reason for Admission: Tetraparesis  Discharge Date: 24 RARS: Readmission Risk Score: 15.1      Last Discharge Facility       Date Complaint Diagnosis Description Type Department Provider    23  Tetraparesis (HCC) ... Admission (Discharged) [facility] Christy Lynn MD            Was this an external facility discharge? No Discharge Facility: [facility]    Challenges to be reviewed by the provider   Additional needs identified to be addressed with provider: No  none               Method of communication with provider: none.    Spoke to Rajani for transitions initial call. Patient stated she is glad to be home, sleeping better with Seroquel. Patient has PCP appt tomorrow, grandson will take her. Pt has not called OP therapy at [facility] yet. Provided contact number for appt. Pt to have PT,OT,.    Denies difficulty swallowing, stated she is taking her meds with pudding, is able to eat regular meals without difficulty, appetite is OK. Bowels have moved since discharge. Urinating without difficulty.    Denies new/worsening symptoms. Remains fatigued, weak. History of falls. Has RW, WC, lift chair, grab bars in home. Pt has family assisting with needs.     Agreeable to transitions calls.     Care Transition Nurse reviewed discharge instructions with patient who verbalized understanding. The patient was given an opportunity to ask questions and does not have any further questions or concerns at this time. Were discharge instructions available to patient? Yes.

## 2024-01-05 ENCOUNTER — OFFICE VISIT (OUTPATIENT)
Dept: PRIMARY CARE CLINIC | Age: 64
End: 2024-01-05

## 2024-01-05 VITALS
HEART RATE: 88 BPM | OXYGEN SATURATION: 99 % | WEIGHT: 109 LBS | SYSTOLIC BLOOD PRESSURE: 124 MMHG | BODY MASS INDEX: 20.6 KG/M2 | DIASTOLIC BLOOD PRESSURE: 82 MMHG

## 2024-01-05 DIAGNOSIS — M81.0 MENOPAUSAL OSTEOPOROSIS: ICD-10-CM

## 2024-01-05 DIAGNOSIS — I50.32 CHRONIC DIASTOLIC CONGESTIVE HEART FAILURE (HCC): ICD-10-CM

## 2024-01-05 DIAGNOSIS — Z09 HOSPITAL DISCHARGE FOLLOW-UP: Primary | ICD-10-CM

## 2024-01-05 DIAGNOSIS — F23 BRIEF PSYCHOTIC DISORDER (HCC): ICD-10-CM

## 2024-01-05 DIAGNOSIS — Z23 IMMUNIZATION DUE: ICD-10-CM

## 2024-01-05 RX ORDER — ALENDRONATE SODIUM 70 MG/1
70 TABLET ORAL
Qty: 12 TABLET | Refills: 1 | Status: SHIPPED | OUTPATIENT
Start: 2024-01-05

## 2024-01-05 SDOH — ECONOMIC STABILITY: FOOD INSECURITY: WITHIN THE PAST 12 MONTHS, THE FOOD YOU BOUGHT JUST DIDN'T LAST AND YOU DIDN'T HAVE MONEY TO GET MORE.: NEVER TRUE

## 2024-01-05 SDOH — ECONOMIC STABILITY: FOOD INSECURITY: WITHIN THE PAST 12 MONTHS, YOU WORRIED THAT YOUR FOOD WOULD RUN OUT BEFORE YOU GOT MONEY TO BUY MORE.: NEVER TRUE

## 2024-01-05 SDOH — ECONOMIC STABILITY: INCOME INSECURITY: HOW HARD IS IT FOR YOU TO PAY FOR THE VERY BASICS LIKE FOOD, HOUSING, MEDICAL CARE, AND HEATING?: NOT HARD AT ALL

## 2024-01-05 ASSESSMENT — PATIENT HEALTH QUESTIONNAIRE - PHQ9
SUM OF ALL RESPONSES TO PHQ QUESTIONS 1-9: 0
10. IF YOU CHECKED OFF ANY PROBLEMS, HOW DIFFICULT HAVE THESE PROBLEMS MADE IT FOR YOU TO DO YOUR WORK, TAKE CARE OF THINGS AT HOME, OR GET ALONG WITH OTHER PEOPLE: 0
SUM OF ALL RESPONSES TO PHQ QUESTIONS 1-9: 0
9. THOUGHTS THAT YOU WOULD BE BETTER OFF DEAD, OR OF HURTING YOURSELF: 0
4. FEELING TIRED OR HAVING LITTLE ENERGY: 0
8. MOVING OR SPEAKING SO SLOWLY THAT OTHER PEOPLE COULD HAVE NOTICED. OR THE OPPOSITE, BEING SO FIGETY OR RESTLESS THAT YOU HAVE BEEN MOVING AROUND A LOT MORE THAN USUAL: 0
SUM OF ALL RESPONSES TO PHQ QUESTIONS 1-9: 0
5. POOR APPETITE OR OVEREATING: 0
2. FEELING DOWN, DEPRESSED OR HOPELESS: 0
SUM OF ALL RESPONSES TO PHQ QUESTIONS 1-9: 0
7. TROUBLE CONCENTRATING ON THINGS, SUCH AS READING THE NEWSPAPER OR WATCHING TELEVISION: 0
6. FEELING BAD ABOUT YOURSELF - OR THAT YOU ARE A FAILURE OR HAVE LET YOURSELF OR YOUR FAMILY DOWN: 0
3. TROUBLE FALLING OR STAYING ASLEEP: 0
1. LITTLE INTEREST OR PLEASURE IN DOING THINGS: 0
SUM OF ALL RESPONSES TO PHQ9 QUESTIONS 1 & 2: 0

## 2024-01-05 NOTE — PATIENT INSTRUCTIONS

## 2024-01-05 NOTE — PROGRESS NOTES
Post-Discharge Transitional Care  Follow Up      Rajani Iglesias   YOB: 1960    Date of Office Visit:  1/5/2024  Date of Hospital Admission: 12/20/23  Date of Hospital Discharge: 1/2/24  Risk of hospital readmission (high >=14%. Medium >=10%) :Readmission Risk Score: 15.1      Care management risk score Rising risk (score 2-5) and Complex Care (Scores >=6): No Risk Score On File     Non face to face  following discharge, date last encounter closed (first attempt may have been earlier): 01/04/2024    Call initiated 2 business days of discharge: Yes      ASSESSMENT/PLAN:   Hospital discharge follow-up  -     SD DISCHARGE MEDS RECONCILED W/ CURRENT OUTPATIENT MED LIST  Chronic diastolic congestive heart failure (HCC)  Assessment & Plan:   Monitored by specialist- no acute findings meriting change in the plan  Brief psychotic disorder (HCC)  Assessment & Plan:    resolved  Immunization due  -     Influenza, FLUCELVAX, (age 6 mo+), IM, Preservative Free, 0.5 mL  Menopausal osteoporosis  -     alendronate (FOSAMAX) 70 MG tablet; Take 1 tablet by mouth every 7 days, Disp-12 tablet, R-1Normal      Medical Decision Making: moderate complexity  Return in about 4 months (around 5/5/2024).           Subjective:   HPI:  Follow up of Hospital problems/diagnosis(es): mental status changes and uti  Was having hallucinations and was very weak  Hadn't been sleeping well for a long time. Sleeping well now with the 1/2 tablet seroquel.    Here with her significant other.   He states her mentation is good unless she is very stressed or the environment is hectic  Inpatient course: Discharge summary reviewed- see chart.    Interval history/Current status: is at home and using her walker  She will be going to PT  Seeing PM today    Patient Active Problem List   Diagnosis    Stenosis of cervical spine with myelopathy (HCC)    Uncontrolled hypertension    Hypothyroidism    Lumbar radiculopathy, chronic    Lumbar neuritis

## 2024-01-09 ENCOUNTER — CARE COORDINATION (OUTPATIENT)
Dept: CASE MANAGEMENT | Age: 64
End: 2024-01-09

## 2024-01-09 NOTE — CARE COORDINATION
Care Transitions Follow Up Call    Patient Current Location:  Home: [address] [address]    Care Transition Nurse contacted the patient by telephone to follow up after admission on  for fatigue at [facility] then  [facility].  Verified name and  with patient as identifiers.    Patient: Rajani Iglesias  Patient : 1960   MRN: [id number]  Reason for Admission: tetraparesis  Discharge Date: 24 RARS: Readmission Risk Score: 15.1      Needs to be reviewed by the provider   Additional needs identified to be addressed with provider: No  none             Method of communication with provider: none.      Spoke with Rajani today for transitional follow up call. She states she has been doing pretty well this past week.  She had her follow up with PCP last Friday.  Her mobility improved after stay at [facility] and was discharged with outpatient therapy.  Today, she states she tried to get into therapy over in [place] at the [facility] on [address] however was told that they don't take her insurance.  Patient is on disability, has Medicare and reports she was able to go there in the past.  After reviewing chart, her Medicare is no longer primary as she recently got  about a year ago and is now under [facility] with her 's insurance.  Explained to her that she will have to call the number on the back of her card to see where she can go for therapy.    Patient had her follow up with PCP last week, she was started on Fosamax for osteoporosis. Notes from visit reviewed.  Patient reports she is sleeping better with the Seroquel, feels she is stronger and is eating and drinking without issues.  She denies any other needs or concerns at this time.     Addressed changes since last contact:  none  Discussed follow-up appointments. If no appointment was previously scheduled, appointment scheduling offered: No.   Is follow up appointment scheduled within 7 days of discharge? Yes.    Follow Up  Future Appointments

## 2024-01-11 ENCOUNTER — TELEPHONE (OUTPATIENT)
Dept: PRIMARY CARE CLINIC | Age: 64
End: 2024-01-11

## 2024-01-11 PROBLEM — R79.89 ELEVATED TROPONIN: Status: RESOLVED | Noted: 2023-12-12 | Resolved: 2024-01-11

## 2024-01-11 NOTE — TELEPHONE ENCOUNTER
Ok for referral  Can reprint referral and just send it there. Was initially made out for PT at [facility].

## 2024-01-11 NOTE — TELEPHONE ENCOUNTER
Pt called stating she needs a referral for PT sent to [facility] in [place]. PT was recommended by [facility].     The order can be faxed to [phone].    Please advise.

## 2024-01-16 ENCOUNTER — CARE COORDINATION (OUTPATIENT)
Dept: CASE MANAGEMENT | Age: 64
End: 2024-01-16

## 2024-01-16 NOTE — CARE COORDINATION
Care Transitions Follow Up CallP    Patient Current Location:  Home: [address] [address]    Care Transition Nurse contacted the patient by telephone to follow up after admission on .  Verified name and  with patient as identifiers.    Patient: Rajani Iglesias  Patient : 1960   MRN: [id number]  Reason for Admission: Tetraparesis  Discharge Date: 24 RARS: Readmission Risk Score: 15.1      Needs to be reviewed by the provider   Additional needs identified to be addressed with provider: Yes  PT-Patient's insurance will not let her go to [facility], she needs order sent over to [place] outpatient therapy department please             Method of communication with provider: chart routing.    Spoke with Rajani today for transitional care follow up call. She states she is doing ok this week, has had no new issues. She has not started outpatient therapy yet, she was going to go to [facility] in [place] where she had before but she recently got  and is covered by spouse's insurance which [facility] does not take.  She was advised last week to check with her insurance where she can go.  Patient can go to [facility] for therapy, would like to go to [facility] for this but needs order to be sent to them.  She did contact PCP office for the order but has not heard back.  Will route message to PCP for this to be sent.  Rajani said everything else is going ok, no new issues.  She denies any other needs or concerns at this time.     Addressed changes since last contact:  none  Discussed follow-up appointments. If no appointment was previously scheduled, appointment scheduling offered: No.   Is follow up appointment scheduled within 7 days of discharge? Yes.    Follow Up  Future Appointments   Date Time Provider Department Center   3/6/2024  2:30 PM Christy Lynn MD [facility] med/reha [facility]   2024 10:20 AM Giovanna Brasher MD [facility]     External follow up appointment(s): n/a    Care Transition

## 2024-01-22 ENCOUNTER — CARE COORDINATION (OUTPATIENT)
Dept: CASE MANAGEMENT | Age: 64
End: 2024-01-22

## 2024-01-22 NOTE — CARE COORDINATION
their healthcare.     Advance Care Planning:   not on file; education provided.     Patients top risk factors for readmission: medical condition-tetraparesis  Interventions to address risk factors: Obtained and reviewed discharge summary and/or continuity of care documents    Offered patient enrollment in the Remote Patient Monitoring (RPM) program for in-home monitoring: Patient is not eligible for RPM program.     Care Transitions Subsequent and Final Call    Schedule Follow Up Appointment with PCP: Completed  Subsequent and Final Calls  Do you have any ongoing symptoms?: Yes  Onset of Patient-reported symptoms: In the past 7 days  Patient-reported symptoms: Weakness  Have your medications changed?: No  Do you have any questions related to your medications?: No  Do you currently have any active services?: Yes  Are you currently active with any services?: Outpatient/Community Services  Do you have any needs or concerns that I can assist you with?: No  Identified Barriers: Lack of Education  Care Transitions Interventions  Other Interventions:             Care Transition Nurse provided contact information for future needs. Plan for follow-up call in 7-10 days based on severity of symptoms and risk factors.  Plan for next call:  check if she started her therapy yet, assess for additional needs.     Tyra Lai RN

## 2024-01-23 PROCEDURE — G0180 MD CERTIFICATION HHA PATIENT: HCPCS | Performed by: FAMILY MEDICINE

## 2024-01-26 ENCOUNTER — TELEPHONE (OUTPATIENT)
Dept: PRIMARY CARE CLINIC | Age: 64
End: 2024-01-26

## 2024-01-26 DIAGNOSIS — G82.54 INCOMPLETE QUADRIPLEGIA AT C5-6 LEVEL (HCC): Primary | ICD-10-CM

## 2024-01-26 DIAGNOSIS — F32.9 REACTIVE DEPRESSION: ICD-10-CM

## 2024-01-26 DIAGNOSIS — G82.50 TETRAPARESIS (HCC): ICD-10-CM

## 2024-01-26 DIAGNOSIS — M43.8X2 SWAN NECK DEFORMITY OF CERVICAL SPINE: ICD-10-CM

## 2024-01-26 DIAGNOSIS — F51.01 PRIMARY INSOMNIA: ICD-10-CM

## 2024-01-26 RX ORDER — QUETIAPINE FUMARATE 25 MG/1
25 TABLET, FILM COATED ORAL NIGHTLY
Qty: 30 TABLET | Refills: 1 | Status: SHIPPED | OUTPATIENT
Start: 2024-01-26 | End: 2024-03-26

## 2024-01-26 NOTE — TELEPHONE ENCOUNTER
Pt's daughter calling. Pt was given a PT order in the hosp that got lost in a drawer at PT, so pt never got a call. They advised her to get a PT order from you due to her weakness and fax it to [facility] @ [phone]  Getting the order from you will assure that you get the records.  Pt has a f/up with you in Feb. Daughter feels like she is going backwards. Not caring for herself, not eating. The Seroquel in not helping her sleep, so not sure if you want to increase the dose or change the med.    Uses [facility] [address].

## 2024-01-26 NOTE — TELEPHONE ENCOUNTER
Please fax PT order as requested  Her current med list states she is taking 1/2 tablet of Seroquel   At her office visit she stated she was sleeping fine.     She can increase that to a whole tablet = 25 mg at HS.   Please check with patient and also Please call her daughter back.

## 2024-01-26 NOTE — TELEPHONE ENCOUNTER
Return call received, discussed message with granddaughter (hipaa) who expressed understanding. They are asking for a refill with updated dose of Seroquel. Pharmacy confirmed.

## 2024-01-27 DIAGNOSIS — I80.8 SUPERFICIAL THROMBOPHLEBITIS OF RIGHT UPPER EXTREMITY: ICD-10-CM

## 2024-01-27 DIAGNOSIS — M79.89 ARM SWELLING: ICD-10-CM

## 2024-01-29 ENCOUNTER — CARE COORDINATION (OUTPATIENT)
Dept: CASE MANAGEMENT | Age: 64
End: 2024-01-29

## 2024-01-29 RX ORDER — ATORVASTATIN CALCIUM 40 MG/1
40 TABLET, FILM COATED ORAL NIGHTLY
Qty: 30 TABLET | Refills: 0 | OUTPATIENT
Start: 2024-01-29

## 2024-01-29 RX ORDER — CARVEDILOL 6.25 MG/1
TABLET ORAL
Qty: 60 TABLET | Refills: 0 | OUTPATIENT
Start: 2024-01-29

## 2024-01-29 RX ORDER — DILTIAZEM HYDROCHLORIDE 180 MG/1
180 CAPSULE, COATED, EXTENDED RELEASE ORAL DAILY
Qty: 30 CAPSULE | Refills: 0 | OUTPATIENT
Start: 2024-01-29

## 2024-01-29 RX ORDER — ASPIRIN 81 MG/1
81 TABLET, COATED ORAL DAILY
Qty: 30 TABLET | Refills: 0 | OUTPATIENT
Start: 2024-01-29

## 2024-01-29 RX ORDER — FOLIC ACID 1 MG/1
1 TABLET ORAL DAILY
Qty: 30 TABLET | Refills: 0 | OUTPATIENT
Start: 2024-01-29

## 2024-01-29 NOTE — CARE COORDINATION
Care Transitions Follow Up Call    Patient Current Location:  Home: [address] [address]    Care Transition Nurse contacted the patient by telephone to follow up after admission on .  Verified name and  with patient as identifiers.    Patient: Rajani Iglesias  Patient : 1960   MRN: [id number]  Reason for Admission: Tetraparesis  Discharge Date: 24 RARS: Readmission Risk Score: 15.1      Needs to be reviewed by the provider   Additional needs identified to be addressed with provider: No  none             Method of communication with provider: none.    Spoke with Rajani today for final call. She states she is doing ok this week.  She still c/o weakness, has not yet started OP therapy.  Last week we had discussed her going to therapy however she wanted to go to the [facility] in [place] where she had therapy before.  She had an insurance change last year and is now under her spouses insurance who covers [facility] facilities.  I did reach out and ask her provider to send the referral to [place] per patient choice.  She has not called yet to set up any therapy, I gave the number last week for her to call. She is doing ok otherwise, will follow up with PCP again in 2 weeks.  Expressed if she calls to therapy and has any issues with scheduling to feel free to reach back out.    Patient denies any other ongoing needs or concerns. Expressed this was final outreach, episode resolved.    Addressed changes since last contact:  none  Discussed follow-up appointments. If no appointment was previously scheduled, appointment scheduling offered: No.   Is follow up appointment scheduled within 7 days of discharge? Yes.    Follow Up  Future Appointments   Date Time Provider Department Center   2024  2:40 PM Giovanna Brasher MD [facility]   3/6/2024  2:30 PM Christy Lynn MD [facility] med/reha [facility]   2024 10:20 AM Giovanna Brasher MD [facility]     External follow up appointment(s):

## 2024-01-31 RX ORDER — DILTIAZEM HYDROCHLORIDE 180 MG/1
180 CAPSULE, COATED, EXTENDED RELEASE ORAL DAILY
Qty: 30 CAPSULE | Refills: 0 | OUTPATIENT
Start: 2024-01-31

## 2024-02-05 DIAGNOSIS — I80.8 SUPERFICIAL THROMBOPHLEBITIS OF RIGHT UPPER EXTREMITY: ICD-10-CM

## 2024-02-05 DIAGNOSIS — M79.89 ARM SWELLING: ICD-10-CM

## 2024-02-05 RX ORDER — FOLIC ACID 1 MG/1
1 TABLET ORAL DAILY
Qty: 30 TABLET | Refills: 0 | OUTPATIENT
Start: 2024-02-05

## 2024-02-05 RX ORDER — ASPIRIN 81 MG/1
81 TABLET, COATED ORAL DAILY
Qty: 30 TABLET | Refills: 0 | OUTPATIENT
Start: 2024-02-05

## 2024-02-05 RX ORDER — CARVEDILOL 6.25 MG/1
TABLET ORAL
Qty: 60 TABLET | Refills: 0 | OUTPATIENT
Start: 2024-02-05

## 2024-02-16 ENCOUNTER — OFFICE VISIT (OUTPATIENT)
Dept: PRIMARY CARE CLINIC | Age: 64
End: 2024-02-16

## 2024-02-16 VITALS
DIASTOLIC BLOOD PRESSURE: 80 MMHG | HEART RATE: 108 BPM | HEIGHT: 61 IN | WEIGHT: 115 LBS | OXYGEN SATURATION: 97 % | BODY MASS INDEX: 21.71 KG/M2 | SYSTOLIC BLOOD PRESSURE: 156 MMHG

## 2024-02-16 DIAGNOSIS — M81.0 POST-MENOPAUSAL OSTEOPOROSIS: ICD-10-CM

## 2024-02-16 DIAGNOSIS — I63.9 CEREBROVASCULAR ACCIDENT (CVA), UNSPECIFIED MECHANISM (HCC): ICD-10-CM

## 2024-02-16 DIAGNOSIS — R41.3 MEMORY LOSS DUE TO MEDICAL CONDITION: Chronic | ICD-10-CM

## 2024-02-16 DIAGNOSIS — I10 ESSENTIAL HYPERTENSION: Primary | ICD-10-CM

## 2024-02-16 DIAGNOSIS — Z23 IMMUNIZATION DUE: ICD-10-CM

## 2024-02-16 RX ORDER — HYDROCODONE BITARTRATE AND ACETAMINOPHEN 5; 325 MG/1; MG/1
TABLET ORAL
COMMUNITY
Start: 2024-01-26

## 2024-02-16 RX ORDER — ZOLEDRONIC ACID 5 MG/100ML
5 INJECTION, SOLUTION INTRAVENOUS ONCE
Qty: 100 ML | Refills: 0 | Status: SHIPPED | OUTPATIENT
Start: 2024-02-16 | End: 2024-02-16

## 2024-02-16 RX ORDER — DILTIAZEM HYDROCHLORIDE 240 MG/1
240 CAPSULE, COATED, EXTENDED RELEASE ORAL DAILY
Qty: 30 CAPSULE | Refills: 3 | Status: SHIPPED | OUTPATIENT
Start: 2024-02-16 | End: 2024-06-15

## 2024-02-16 RX ORDER — ATORVASTATIN CALCIUM 40 MG/1
40 TABLET, FILM COATED ORAL
Qty: 8 TABLET | Refills: 3 | Status: SHIPPED | OUTPATIENT
Start: 2024-02-19

## 2024-02-16 RX ORDER — FOLIC ACID 1 MG/1
1 TABLET ORAL DAILY
Qty: 30 TABLET | Refills: 5 | Status: SHIPPED | OUTPATIENT
Start: 2024-02-16

## 2024-02-16 RX ORDER — MELATONIN
2000 DAILY
Qty: 30 TABLET | Refills: 0 | COMMUNITY
Start: 2024-02-16

## 2024-02-16 NOTE — PROGRESS NOTES
medications, diet andexercise.  Patient agreed with treatment plan. Follow up as directed.     Electronicallysigned by Giovanna Brasher MD on 2/16/2024 at 4:38 PM

## 2024-02-26 RX ORDER — DILTIAZEM HYDROCHLORIDE 180 MG/1
180 CAPSULE, COATED, EXTENDED RELEASE ORAL DAILY
Qty: 90 CAPSULE | OUTPATIENT
Start: 2024-02-26

## 2024-02-28 RX ORDER — DILTIAZEM HYDROCHLORIDE 180 MG/1
180 CAPSULE, COATED, EXTENDED RELEASE ORAL DAILY
Qty: 30 CAPSULE | Refills: 3 | OUTPATIENT
Start: 2024-02-28

## 2024-02-28 RX ORDER — BUSPIRONE HYDROCHLORIDE 30 MG/1
30 TABLET ORAL 2 TIMES DAILY
Qty: 60 TABLET | Refills: 0 | OUTPATIENT
Start: 2024-02-28

## 2024-02-29 RX ORDER — BUSPIRONE HYDROCHLORIDE 30 MG/1
30 TABLET ORAL 2 TIMES DAILY
Qty: 60 TABLET | Refills: 4 | Status: SHIPPED | OUTPATIENT
Start: 2024-02-29

## 2024-02-29 NOTE — TELEPHONE ENCOUNTER
LAST VISIT:   2/16/2024     Future Appointments   Date Time Provider Department Center   3/6/2024  2:30 PM Christy Lynn MD [facility] med/reha [facility]   3/8/2024  2:30 PM [facility]EDULE, [facility]   5/8/2024 10:20 AM Giovanna Brasher MD [facility]

## 2024-03-04 LAB
BUN / CREAT RATIO: NORMAL
BUN BLDV-MCNC: NORMAL MG/DL
CALCIUM SERPL-MCNC: NORMAL MG/DL
CREAT SERPL-MCNC: NORMAL MG/DL

## 2024-03-06 ENCOUNTER — PROCEDURE VISIT (OUTPATIENT)
Dept: PHYSICAL MEDICINE AND REHAB | Age: 64
End: 2024-03-06
Payer: COMMERCIAL

## 2024-03-06 VITALS
WEIGHT: 112.8 LBS | HEART RATE: 72 BPM | SYSTOLIC BLOOD PRESSURE: 128 MMHG | DIASTOLIC BLOOD PRESSURE: 62 MMHG | BODY MASS INDEX: 21.32 KG/M2 | TEMPERATURE: 97.8 F

## 2024-03-06 DIAGNOSIS — M62.838 OTHER MUSCLE SPASM: Primary | ICD-10-CM

## 2024-03-06 DIAGNOSIS — R25.2 SPASTICITY: ICD-10-CM

## 2024-03-06 DIAGNOSIS — M79.10 MYALGIA: ICD-10-CM

## 2024-03-06 PROCEDURE — 64642 CHEMODENERV 1 EXTREMITY 1-4: CPT | Performed by: PHYSICAL MEDICINE & REHABILITATION

## 2024-03-06 PROCEDURE — 95874 GUIDE NERV DESTR NEEDLE EMG: CPT | Performed by: PHYSICAL MEDICINE & REHABILITATION

## 2024-03-06 PROCEDURE — 20553 NJX 1/MLT TRIGGER POINTS 3/>: CPT | Performed by: PHYSICAL MEDICINE & REHABILITATION

## 2024-03-06 RX ORDER — LIDOCAINE HYDROCHLORIDE 10 MG/ML
5 INJECTION, SOLUTION INFILTRATION; PERINEURAL ONCE
Status: COMPLETED | OUTPATIENT
Start: 2024-03-06 | End: 2024-03-06

## 2024-03-06 RX ADMIN — LIDOCAINE HYDROCHLORIDE 5 ML: 10 INJECTION, SOLUTION INFILTRATION; PERINEURAL at 15:17

## 2024-03-06 NOTE — PROGRESS NOTES
[facility] PHYSICAL MEDICINE & REHABILITATION  [address] [address]  Dept: [phone]  Dept Fax: [phone]    Outpatient Followup Note    Rajani Iglesias, 63 y.o., female, presents for follow up c/o of Botox Injection  .     HPI:     HPI  Patient with tetraparesis secondary to cervical myelopathy and known history cauda equina syndrome. She is being seen in follow up after acute rehab admission to [facility] from 12/20/23 - 1/2/24. She is noting severe posterior cervical spinal muscle pain associated with muscle tightness and trigger points. She has had trigger point injections in the past with mild relief but it did not last very long. She is willing to try them again but if she does not obtain at least 50% improvement in her pain and muscle tightness she may benefit from botulinum toxin trigger point injections.     She also has persistent spasticity L plantar flexors/inverters which is affecting her gait. She is not able to tolerate the AFO. Plan is for Botox treatment to [facility] today.     Past Medical History:   Diagnosis Date    Anxiety     Arthritis     At maximum risk for fall 12/29/2021    caudal equina syndrome and cervical stenosis    Breast cancer (HCC)     Cancer (HCC)     right breast    Cauda equina syndrome (HCC) 12/29/2021    neuropathy, mobility difficulty, incontinance    Cervical stenosis of spine 12/29/2021    GERD (gastroesophageal reflux disease)     History of stomach ulcers     History of therapeutic radiation     Hx antineoplastic chemo     Hypertension     Dr. MARY LOU Brasher    Hypothyroidism     Lumbar neuritis     Post laminectomy syndrome     Spinal deformity 11/2021    cervical and lumbar    Thyroid disease     Uses wheelchair     Wears dentures     Wellness examination     Dr. MARY LOU Brasher      Past Surgical History:   Procedure Laterality Date    BACK SURGERY      lower back x 3, cervical- x 1: C4- C6, 11/4/2014

## 2024-03-07 DIAGNOSIS — M81.0 POST-MENOPAUSAL OSTEOPOROSIS: ICD-10-CM

## 2024-03-08 ENCOUNTER — NURSE ONLY (OUTPATIENT)
Dept: PRIMARY CARE CLINIC | Age: 64
End: 2024-03-08

## 2024-03-08 VITALS
WEIGHT: 113.4 LBS | SYSTOLIC BLOOD PRESSURE: 136 MMHG | BODY MASS INDEX: 21.41 KG/M2 | HEART RATE: 99 BPM | HEIGHT: 61 IN | DIASTOLIC BLOOD PRESSURE: 80 MMHG | OXYGEN SATURATION: 96 %

## 2024-03-15 ENCOUNTER — PATIENT MESSAGE (OUTPATIENT)
Dept: PRIMARY CARE CLINIC | Age: 64
End: 2024-03-15

## 2024-03-20 ENCOUNTER — TELEPHONE (OUTPATIENT)
Dept: PHYSICAL MEDICINE AND REHAB | Age: 64
End: 2024-03-20

## 2024-03-20 NOTE — TELEPHONE ENCOUNTER
Rajani called and said that you asked her to call in to tell us about the effectiveness of TPI given on 3/6. (She also had Botox to leg at that time).   She said that after about 8 days she had a return of headaches.   She is hopeful that she can get relief with some other treatment if her insurance would cover it.    She has follow up on 4/3.     Thank you.

## 2024-03-23 DIAGNOSIS — F32.9 REACTIVE DEPRESSION: ICD-10-CM

## 2024-03-23 DIAGNOSIS — F51.01 PRIMARY INSOMNIA: ICD-10-CM

## 2024-03-25 RX ORDER — PANTOPRAZOLE SODIUM 40 MG/1
40 TABLET, DELAYED RELEASE ORAL DAILY
Qty: 30 TABLET | Refills: 0 | OUTPATIENT
Start: 2024-03-25

## 2024-03-25 RX ORDER — QUETIAPINE FUMARATE 25 MG/1
25 TABLET, FILM COATED ORAL NIGHTLY
Qty: 30 TABLET | Refills: 1 | Status: SHIPPED | OUTPATIENT
Start: 2024-03-25

## 2024-04-03 ENCOUNTER — OFFICE VISIT (OUTPATIENT)
Dept: PHYSICAL MEDICINE AND REHAB | Age: 64
End: 2024-04-03
Payer: MEDICARE

## 2024-04-03 VITALS
WEIGHT: 115.6 LBS | DIASTOLIC BLOOD PRESSURE: 60 MMHG | HEART RATE: 88 BPM | TEMPERATURE: 98.4 F | BODY MASS INDEX: 21.84 KG/M2 | SYSTOLIC BLOOD PRESSURE: 132 MMHG

## 2024-04-03 DIAGNOSIS — M79.10 MYALGIA: ICD-10-CM

## 2024-04-03 DIAGNOSIS — R25.2 SPASTICITY: ICD-10-CM

## 2024-04-03 DIAGNOSIS — M62.838 OTHER MUSCLE SPASM: Primary | ICD-10-CM

## 2024-04-03 PROCEDURE — 3078F DIAST BP <80 MM HG: CPT | Performed by: PHYSICAL MEDICINE & REHABILITATION

## 2024-04-03 PROCEDURE — 3075F SYST BP GE 130 - 139MM HG: CPT | Performed by: PHYSICAL MEDICINE & REHABILITATION

## 2024-04-03 PROCEDURE — G8420 CALC BMI NORM PARAMETERS: HCPCS | Performed by: PHYSICAL MEDICINE & REHABILITATION

## 2024-04-03 PROCEDURE — 99212 OFFICE O/P EST SF 10 MIN: CPT | Performed by: PHYSICAL MEDICINE & REHABILITATION

## 2024-04-03 PROCEDURE — 3017F COLORECTAL CA SCREEN DOC REV: CPT | Performed by: PHYSICAL MEDICINE & REHABILITATION

## 2024-04-03 PROCEDURE — 1036F TOBACCO NON-USER: CPT | Performed by: PHYSICAL MEDICINE & REHABILITATION

## 2024-04-03 PROCEDURE — G8427 DOCREV CUR MEDS BY ELIG CLIN: HCPCS | Performed by: PHYSICAL MEDICINE & REHABILITATION

## 2024-04-03 NOTE — PROGRESS NOTES
[facility] PHYSICAL MEDICINE & REHABILITATION  [address] [address]  Dept: [phone]  Dept Fax: [phone]    Outpatient Followup Note    Rajani Iglesias, 63 y.o., female, presents for follow up c/o of Pain (Lower back follow up from Botox)  .     HPI:     HPI  Patient with tetraparesis secondary to cervical myelopathy and known history cauda equina syndrome. She is being seen in follow up after Botox treatment of spasticity and trigger point injections to address myofascial posterior neck muscle pain. She notes some improvement in the tightness/tone in her L calf muscles after Botox. She is still getting intermittent spasms in her BLEs but reports that these are diffuse and when they occur it is tough to determine if any are happening in her calf muscles.    She reports some relief of her neck muscle pain after trigger point injections with lidocaine which only lasted for 1 week. She has significant myofascial tightness after her cervical spine surgery. This may benefit from treatment with Botox trigger point injections.     Past Medical History:   Diagnosis Date    Anxiety     Arthritis     At maximum risk for fall 12/29/2021    caudal equina syndrome and cervical stenosis    Breast cancer (HCC)     Cancer (HCC)     right breast    Cauda equina syndrome (HCC) 12/29/2021    neuropathy, mobility difficulty, incontinance    Cervical stenosis of spine 12/29/2021    GERD (gastroesophageal reflux disease)     History of stomach ulcers     History of therapeutic radiation     Hx antineoplastic chemo     Hypertension     Dr. MARY LOU Brasher    Hypothyroidism     Lumbar neuritis     Post laminectomy syndrome     Spinal deformity 11/2021    cervical and lumbar    Thyroid disease     Uses wheelchair     Wears dentures     Wellness examination     Dr. MARY LOU Brasher      Past Surgical History:   Procedure Laterality Date    BACK SURGERY      lower back x 3,

## 2024-04-12 ENCOUNTER — TELEPHONE (OUTPATIENT)
Dept: PRIMARY CARE CLINIC | Age: 64
End: 2024-04-12

## 2024-04-12 DIAGNOSIS — M81.8 OTHER OSTEOPOROSIS WITHOUT CURRENT PATHOLOGICAL FRACTURE: Primary | ICD-10-CM

## 2024-04-12 NOTE — TELEPHONE ENCOUNTER
Pt's daughter Mary asking for the bun/creatinine and calcium bloodwork to be ordered again and the reclast printed so that pt can get her injection.       Fax orders to [facility] at [phone]

## 2024-04-12 NOTE — TELEPHONE ENCOUNTER
BUN, Creatinine, calcium were done at [facility] in March, I will order another one in case it's still needed

## 2024-04-29 RX ORDER — DILTIAZEM HYDROCHLORIDE 180 MG/1
180 CAPSULE, COATED, EXTENDED RELEASE ORAL DAILY
Qty: 90 CAPSULE | Refills: 0 | Status: SHIPPED | OUTPATIENT
Start: 2024-04-29

## 2024-04-30 RX ORDER — PANTOPRAZOLE SODIUM 40 MG/1
40 TABLET, DELAYED RELEASE ORAL DAILY
Qty: 30 TABLET | Refills: 0 | Status: SHIPPED | OUTPATIENT
Start: 2024-04-30

## 2024-05-01 DIAGNOSIS — I63.9 CEREBROVASCULAR ACCIDENT (CVA), UNSPECIFIED MECHANISM (HCC): ICD-10-CM

## 2024-05-01 RX ORDER — ATORVASTATIN CALCIUM 40 MG/1
TABLET, FILM COATED ORAL
Qty: 8 TABLET | Refills: 3 | Status: SHIPPED | OUTPATIENT
Start: 2024-05-01

## 2024-05-08 RX ORDER — LISINOPRIL 5 MG/1
5 TABLET ORAL DAILY
Qty: 30 TABLET | Refills: 3 | Status: SHIPPED | OUTPATIENT
Start: 2024-05-08

## 2024-05-14 RX ORDER — LEVOTHYROXINE SODIUM 0.1 MG/1
100 TABLET ORAL DAILY
Qty: 90 TABLET | Refills: 0 | Status: SHIPPED | OUTPATIENT
Start: 2024-05-14

## 2024-05-28 DIAGNOSIS — F51.01 PRIMARY INSOMNIA: ICD-10-CM

## 2024-05-28 DIAGNOSIS — F32.9 REACTIVE DEPRESSION: ICD-10-CM

## 2024-05-28 RX ORDER — QUETIAPINE FUMARATE 25 MG/1
25 TABLET, FILM COATED ORAL NIGHTLY
Qty: 30 TABLET | Refills: 1 | Status: SHIPPED | OUTPATIENT
Start: 2024-05-28

## 2024-05-28 RX ORDER — PANTOPRAZOLE SODIUM 40 MG/1
40 TABLET, DELAYED RELEASE ORAL DAILY
Qty: 30 TABLET | Refills: 0 | Status: SHIPPED | OUTPATIENT
Start: 2024-05-28

## 2024-06-16 DIAGNOSIS — D64.9 ANEMIA, NORMOCYTIC NORMOCHROMIC: Primary | ICD-10-CM

## 2024-06-16 DIAGNOSIS — I63.9 CEREBROVASCULAR ACCIDENT (CVA), UNSPECIFIED MECHANISM (HCC): ICD-10-CM

## 2024-06-17 RX ORDER — CARVEDILOL 6.25 MG/1
TABLET ORAL
Qty: 60 TABLET | Refills: 0 | OUTPATIENT
Start: 2024-06-17

## 2024-06-18 RX ORDER — ATORVASTATIN CALCIUM 40 MG/1
TABLET, FILM COATED ORAL
Qty: 24 TABLET | Refills: 5 | Status: SHIPPED | OUTPATIENT
Start: 2024-06-18

## 2024-06-18 RX ORDER — FOLIC ACID 1 MG/1
1 TABLET ORAL DAILY
Qty: 90 TABLET | Refills: 3 | Status: SHIPPED | OUTPATIENT
Start: 2024-06-18

## 2024-06-18 RX ORDER — DILTIAZEM HYDROCHLORIDE 240 MG/1
240 CAPSULE, COATED, EXTENDED RELEASE ORAL DAILY
Qty: 30 CAPSULE | Refills: 3 | Status: SHIPPED | OUTPATIENT
Start: 2024-06-18 | End: 2024-10-16

## 2024-06-19 ENCOUNTER — PROCEDURE VISIT (OUTPATIENT)
Dept: PHYSICAL MEDICINE AND REHAB | Age: 64
End: 2024-06-19
Payer: COMMERCIAL

## 2024-06-19 VITALS
SYSTOLIC BLOOD PRESSURE: 120 MMHG | TEMPERATURE: 97.5 F | BODY MASS INDEX: 20.9 KG/M2 | DIASTOLIC BLOOD PRESSURE: 70 MMHG | WEIGHT: 110.6 LBS | HEART RATE: 98 BPM

## 2024-06-19 DIAGNOSIS — M62.838 OTHER MUSCLE SPASM: Primary | ICD-10-CM

## 2024-06-19 DIAGNOSIS — S14.109S SPINAL CORD INJURY, CERVICAL REGION, SEQUELA (HCC): ICD-10-CM

## 2024-06-19 DIAGNOSIS — G24.3 CERVICAL DYSTONIA: ICD-10-CM

## 2024-06-19 PROCEDURE — G8420 CALC BMI NORM PARAMETERS: HCPCS | Performed by: PHYSICAL MEDICINE & REHABILITATION

## 2024-06-19 PROCEDURE — 1036F TOBACCO NON-USER: CPT | Performed by: PHYSICAL MEDICINE & REHABILITATION

## 2024-06-19 PROCEDURE — 99212 OFFICE O/P EST SF 10 MIN: CPT | Performed by: PHYSICAL MEDICINE & REHABILITATION

## 2024-06-19 PROCEDURE — 3017F COLORECTAL CA SCREEN DOC REV: CPT | Performed by: PHYSICAL MEDICINE & REHABILITATION

## 2024-06-19 PROCEDURE — 3074F SYST BP LT 130 MM HG: CPT | Performed by: PHYSICAL MEDICINE & REHABILITATION

## 2024-06-19 PROCEDURE — G8427 DOCREV CUR MEDS BY ELIG CLIN: HCPCS | Performed by: PHYSICAL MEDICINE & REHABILITATION

## 2024-06-19 PROCEDURE — 3078F DIAST BP <80 MM HG: CPT | Performed by: PHYSICAL MEDICINE & REHABILITATION

## 2024-06-19 RX ORDER — ALENDRONATE SODIUM 70 MG/1
TABLET ORAL
COMMUNITY
Start: 2024-04-28 | End: 2024-06-20

## 2024-06-19 NOTE — PROGRESS NOTES
[facility] PHYSICAL MEDICINE & REHABILITATION  [address] [address]  Dept: [phone]  Dept Fax: [phone]    Outpatient Followup Note    Rajani Iglesias, 63 y.o., female, presents for follow up c/o of Pain (Patient presents today c/o spasticity and pain.)  .     HPI:     HPI  Patient with tetraparesis secondary to cervical myelopathy and known history of cauda equina syndrome. She is being seen today to address persistent neck pain related to cervical muscle dystonia and spasticity L lower extremity. She had Botox LLE in March and notes improvement in her tone and ambulation since then. She is still feeling relief of the tone and pain today.     Her primary complaint today is neck pain associated with dystonic posture. Pain has failed trigger point injections with lidocaine and conservative measures including physical therapy.     Past Medical History:   Diagnosis Date    Anxiety     Arthritis     At maximum risk for fall 12/29/2021    caudal equina syndrome and cervical stenosis    Breast cancer (HCC)     Cancer (HCC)     right breast    Cauda equina syndrome (HCC) 12/29/2021    neuropathy, mobility difficulty, incontinance    Cervical stenosis of spine 12/29/2021    GERD (gastroesophageal reflux disease)     History of stomach ulcers     History of therapeutic radiation     Hx antineoplastic chemo     Hypertension     Dr. MARY LOU Brasher    Hypothyroidism     Lumbar neuritis     Post laminectomy syndrome     Spinal deformity 11/2021    cervical and lumbar    Thyroid disease     Uses wheelchair     Wears dentures     Wellness examination     Dr. MARY LOU Brasher      Past Surgical History:   Procedure Laterality Date    BACK SURGERY      lower back x 3, cervical- x 1: C4- C6, 11/4/2014     BREAST SURGERY Right     mastectomy with reconstruction    CERVICAL FUSION N/A 4/12/2022    C5 CORPECTOMY, USE OF INTRAOPERATIVE CERVICAL TRACTION performed by Linda

## 2024-06-20 ENCOUNTER — OFFICE VISIT (OUTPATIENT)
Dept: PRIMARY CARE CLINIC | Age: 64
End: 2024-06-20
Payer: COMMERCIAL

## 2024-06-20 VITALS
HEART RATE: 100 BPM | DIASTOLIC BLOOD PRESSURE: 76 MMHG | BODY MASS INDEX: 21.24 KG/M2 | SYSTOLIC BLOOD PRESSURE: 100 MMHG | OXYGEN SATURATION: 96 % | WEIGHT: 112.4 LBS

## 2024-06-20 DIAGNOSIS — Z86.73 HISTORY OF STROKE: ICD-10-CM

## 2024-06-20 DIAGNOSIS — I10 ESSENTIAL HYPERTENSION: Primary | ICD-10-CM

## 2024-06-20 DIAGNOSIS — I50.32 CHRONIC DIASTOLIC HEART FAILURE (HCC): ICD-10-CM

## 2024-06-20 DIAGNOSIS — E03.8 OTHER SPECIFIED HYPOTHYROIDISM: ICD-10-CM

## 2024-06-20 DIAGNOSIS — K21.9 GASTROESOPHAGEAL REFLUX DISEASE, UNSPECIFIED WHETHER ESOPHAGITIS PRESENT: ICD-10-CM

## 2024-06-20 DIAGNOSIS — F51.01 PRIMARY INSOMNIA: ICD-10-CM

## 2024-06-20 PROBLEM — I21.4 NSTEMI (NON-ST ELEVATED MYOCARDIAL INFARCTION) (HCC): Status: RESOLVED | Noted: 2023-12-13 | Resolved: 2024-06-20

## 2024-06-20 PROBLEM — F23 BRIEF PSYCHOTIC DISORDER (HCC): Status: RESOLVED | Noted: 2024-01-05 | Resolved: 2024-06-20

## 2024-06-20 PROBLEM — I63.9 CEREBROVASCULAR ACCIDENT (HCC): Status: RESOLVED | Noted: 2024-01-01 | Resolved: 2024-06-20

## 2024-06-20 PROCEDURE — 3074F SYST BP LT 130 MM HG: CPT | Performed by: STUDENT IN AN ORGANIZED HEALTH CARE EDUCATION/TRAINING PROGRAM

## 2024-06-20 PROCEDURE — 1036F TOBACCO NON-USER: CPT | Performed by: STUDENT IN AN ORGANIZED HEALTH CARE EDUCATION/TRAINING PROGRAM

## 2024-06-20 PROCEDURE — G8427 DOCREV CUR MEDS BY ELIG CLIN: HCPCS | Performed by: STUDENT IN AN ORGANIZED HEALTH CARE EDUCATION/TRAINING PROGRAM

## 2024-06-20 PROCEDURE — 3017F COLORECTAL CA SCREEN DOC REV: CPT | Performed by: STUDENT IN AN ORGANIZED HEALTH CARE EDUCATION/TRAINING PROGRAM

## 2024-06-20 PROCEDURE — 3078F DIAST BP <80 MM HG: CPT | Performed by: STUDENT IN AN ORGANIZED HEALTH CARE EDUCATION/TRAINING PROGRAM

## 2024-06-20 PROCEDURE — 99214 OFFICE O/P EST MOD 30 MIN: CPT | Performed by: STUDENT IN AN ORGANIZED HEALTH CARE EDUCATION/TRAINING PROGRAM

## 2024-06-20 PROCEDURE — G8420 CALC BMI NORM PARAMETERS: HCPCS | Performed by: STUDENT IN AN ORGANIZED HEALTH CARE EDUCATION/TRAINING PROGRAM

## 2024-06-20 RX ORDER — PANTOPRAZOLE SODIUM 40 MG/1
40 TABLET, DELAYED RELEASE ORAL DAILY
Qty: 30 TABLET | Refills: 2 | Status: SHIPPED | OUTPATIENT
Start: 2024-06-20

## 2024-06-20 ASSESSMENT — ENCOUNTER SYMPTOMS
WHEEZING: 0
SHORTNESS OF BREATH: 0
ABDOMINAL PAIN: 0
VOMITING: 0
NAUSEA: 0

## 2024-06-20 NOTE — PROGRESS NOTES
Surgical History:   Procedure Laterality Date    BACK SURGERY      lower back x 3, cervical- x 1: C4- C6, 11/4/2014     BREAST SURGERY Right     mastectomy with reconstruction    CERVICAL FUSION N/A 4/12/2022    C5 CORPECTOMY, USE OF INTRAOPERATIVE CERVICAL TRACTION performed by Linda Ludwig DO at [facility] OR    CERVICAL FUSION N/A 4/12/2022    POSTERIOR FIXATION C2-C7 performed by Linda Ludwig DO at [facility] OR    COLONOSCOPY  about 2018    HERNIA REPAIR Bilateral     inguinal    HYSTERECTOMY, TOTAL ABDOMINAL (CERVIX REMOVED)      LUMBAR SPINE SURGERY N/A 12/30/2021    LUMBAR LAMINECTOMY L2-S1 performed by Linda Ludwig DO at [facility] OR    MASTECTOMY, RADICAL      OTHER SURGICAL HISTORY  04/12/2022    C5 CORPECTOMY, USE OF INTRAOPERATIVE CERVICAL TRACTION (N/A Spine Cervical)        Family History   Problem Relation Age of Onset    Hypertension Mother     Heart Disease Mother     Cancer Mother        Social History     Tobacco Use    Smoking status: Former     Current packs/day: 0.00     Average packs/day: 0.5 packs/day for 30.0 years (15.0 ttl pk-yrs)     Types: Cigarettes     Start date: 1984     Quit date: 2014     Years since quitting: 10.4    Smokeless tobacco: Never   Substance Use Topics    Alcohol use: Yes     Alcohol/week: 0.0 standard drinks of alcohol     Comment: weekend at times      Current Outpatient Medications   Medication Sig Dispense Refill    pantoprazole (PROTONIX) 40 MG tablet Take 1 tablet by mouth daily 30 tablet 2    dilTIAZem (CARDIZEM CD) 240 MG extended release capsule TAKE 1 CAPSULE BY MOUTH DAILY 30 capsule 3    atorvastatin (LIPITOR) 40 MG tablet TAKE ONE TABLET BY MOUTH TWICE A WEEK 24 tablet 5    folic acid (FOLVITE) 1 MG tablet TAKE 1 TABLET BY MOUTH DAILY 90 tablet 3    QUEtiapine (SEROQUEL) 25 MG tablet TAKE ONE TABLET BY MOUTH ONCE NIGHTLY 30 tablet 1    levothyroxine (SYNTHROID) 100 MCG tablet TAKE 1 TABLET BY MOUTH DAILY 90 tablet 0    lisinopril (PRINIVIL;ZESTRIL) 5 MG tablet

## 2024-07-01 PROCEDURE — 96375 TX/PRO/DX INJ NEW DRUG ADDON: CPT

## 2024-07-01 PROCEDURE — 96365 THER/PROPH/DIAG IV INF INIT: CPT

## 2024-07-01 PROCEDURE — 99285 EMERGENCY DEPT VISIT HI MDM: CPT

## 2024-07-01 ASSESSMENT — PAIN - FUNCTIONAL ASSESSMENT: PAIN_FUNCTIONAL_ASSESSMENT: 0-10

## 2024-07-01 ASSESSMENT — PAIN SCALES - GENERAL: PAINLEVEL_OUTOF10: 10

## 2024-07-01 ASSESSMENT — PAIN DESCRIPTION - LOCATION: LOCATION: ABDOMEN

## 2024-07-01 ASSESSMENT — LIFESTYLE VARIABLES
HOW MANY STANDARD DRINKS CONTAINING ALCOHOL DO YOU HAVE ON A TYPICAL DAY: 1 OR 2
HOW OFTEN DO YOU HAVE A DRINK CONTAINING ALCOHOL: MONTHLY OR LESS

## 2024-07-02 ENCOUNTER — APPOINTMENT (OUTPATIENT)
Dept: CT IMAGING | Age: 64
DRG: 330 | End: 2024-07-02
Payer: COMMERCIAL

## 2024-07-02 ENCOUNTER — ANESTHESIA (OUTPATIENT)
Dept: OPERATING ROOM | Age: 64
End: 2024-07-02
Payer: MEDICARE

## 2024-07-02 ENCOUNTER — HOSPITAL ENCOUNTER (INPATIENT)
Age: 64
LOS: 4 days | Discharge: HOME HEALTH | DRG: 330 | End: 2024-07-06
Attending: STUDENT IN AN ORGANIZED HEALTH CARE EDUCATION/TRAINING PROGRAM | Admitting: INTERNAL MEDICINE
Payer: COMMERCIAL

## 2024-07-02 ENCOUNTER — ANESTHESIA EVENT (OUTPATIENT)
Dept: OPERATING ROOM | Age: 64
End: 2024-07-02
Payer: MEDICARE

## 2024-07-02 ENCOUNTER — APPOINTMENT (OUTPATIENT)
Dept: GENERAL RADIOLOGY | Age: 64
DRG: 330 | End: 2024-07-02
Payer: COMMERCIAL

## 2024-07-02 DIAGNOSIS — K56.609 LARGE BOWEL OBSTRUCTION (HCC): Primary | ICD-10-CM

## 2024-07-02 DIAGNOSIS — K56.609 COLON OBSTRUCTION (HCC): ICD-10-CM

## 2024-07-02 LAB
ALBUMIN SERPL-MCNC: 3.8 G/DL (ref 3.5–5.2)
ALP SERPL-CCNC: 104 U/L (ref 35–104)
ALT SERPL-CCNC: 35 U/L (ref 5–33)
ANION GAP SERPL CALCULATED.3IONS-SCNC: 18 MMOL/L (ref 9–17)
AST SERPL-CCNC: 32 U/L
BACTERIA URNS QL MICRO: ABNORMAL
BASOPHILS # BLD: 0.1 K/UL (ref 0–0.2)
BASOPHILS NFR BLD: 1 % (ref 0–2)
BILIRUB SERPL-MCNC: 1.3 MG/DL (ref 0.3–1.2)
BILIRUB UR QL STRIP: NEGATIVE
BUN SERPL-MCNC: 17 MG/DL (ref 8–23)
CALCIUM SERPL-MCNC: 7.5 MG/DL (ref 8.6–10.4)
CASTS #/AREA URNS LPF: ABNORMAL /LPF
CHLORIDE SERPL-SCNC: 85 MMOL/L (ref 98–107)
CLARITY UR: ABNORMAL
CO2 SERPL-SCNC: 26 MMOL/L (ref 20–31)
COLOR UR: YELLOW
CREAT SERPL-MCNC: 0.7 MG/DL (ref 0.5–0.9)
EKG ATRIAL RATE: 99 BPM
EKG P AXIS: 45 DEGREES
EKG P-R INTERVAL: 170 MS
EKG Q-T INTERVAL: 382 MS
EKG QRS DURATION: 64 MS
EKG QTC CALCULATION (BAZETT): 490 MS
EKG R AXIS: 25 DEGREES
EKG T AXIS: 55 DEGREES
EKG VENTRICULAR RATE: 99 BPM
EOSINOPHIL # BLD: 0 K/UL (ref 0–0.4)
EOSINOPHILS RELATIVE PERCENT: 0 % (ref 0–4)
EPI CELLS #/AREA URNS HPF: ABNORMAL /HPF
ERYTHROCYTE [DISTWIDTH] IN BLOOD BY AUTOMATED COUNT: 14.6 % (ref 11.5–14.9)
GFR, ESTIMATED: >90 ML/MIN/1.73M2
GLUCOSE SERPL-MCNC: 102 MG/DL (ref 70–99)
GLUCOSE UR STRIP-MCNC: ABNORMAL MG/DL
HCT VFR BLD AUTO: 34.3 % (ref 36–46)
HGB BLD-MCNC: 12.1 G/DL (ref 12–16)
HGB UR QL STRIP.AUTO: ABNORMAL
KETONES UR STRIP-MCNC: ABNORMAL MG/DL
LACTATE BLDV-SCNC: 1.1 MMOL/L (ref 0.5–2.2)
LEUKOCYTE ESTERASE UR QL STRIP: ABNORMAL
LIPASE SERPL-CCNC: 40 U/L (ref 13–60)
LYMPHOCYTES NFR BLD: 0.7 K/UL (ref 1–4.8)
LYMPHOCYTES RELATIVE PERCENT: 7 % (ref 24–44)
MAGNESIUM SERPL-MCNC: 1.2 MG/DL (ref 1.6–2.6)
MCH RBC QN AUTO: 35.1 PG (ref 26–34)
MCHC RBC AUTO-ENTMCNC: 35.3 G/DL (ref 31–37)
MCV RBC AUTO: 99.7 FL (ref 80–100)
MONOCYTES NFR BLD: 1 K/UL (ref 0.1–1.3)
MONOCYTES NFR BLD: 10 % (ref 1–7)
NEUTROPHILS NFR BLD: 82 % (ref 36–66)
NEUTS SEG NFR BLD: 7.7 K/UL (ref 1.3–9.1)
NITRITE UR QL STRIP: NEGATIVE
PH UR STRIP: 5.5 [PH] (ref 5–8)
PLATELET # BLD AUTO: 286 K/UL (ref 150–450)
PMV BLD AUTO: 7.4 FL (ref 6–12)
POTASSIUM SERPL-SCNC: 3.4 MMOL/L (ref 3.7–5.3)
PROT SERPL-MCNC: 8 G/DL (ref 6.4–8.3)
PROT UR STRIP-MCNC: ABNORMAL MG/DL
RBC # BLD AUTO: 3.44 M/UL (ref 4–5.2)
RBC #/AREA URNS HPF: ABNORMAL /HPF
SODIUM SERPL-SCNC: 129 MMOL/L (ref 135–144)
SP GR UR STRIP: 1.05 (ref 1–1.03)
TROPONIN I SERPL HS-MCNC: 79 NG/L (ref 0–14)
TROPONIN I SERPL HS-MCNC: 88 NG/L (ref 0–14)
UROBILINOGEN UR STRIP-ACNC: NORMAL EU/DL (ref 0–1)
WBC #/AREA URNS HPF: ABNORMAL /HPF
WBC OTHER # BLD: 9.5 K/UL (ref 3.5–11)

## 2024-07-02 PROCEDURE — 3600000002 HC SURGERY LEVEL 2 BASE: Performed by: SURGERY

## 2024-07-02 PROCEDURE — 88307 TISSUE EXAM BY PATHOLOGIST: CPT

## 2024-07-02 PROCEDURE — 3700000000 HC ANESTHESIA ATTENDED CARE: Performed by: SURGERY

## 2024-07-02 PROCEDURE — 0D1B0Z4 BYPASS ILEUM TO CUTANEOUS, OPEN APPROACH: ICD-10-PCS | Performed by: SURGERY

## 2024-07-02 PROCEDURE — 6360000002 HC RX W HCPCS: Performed by: ANESTHESIOLOGY

## 2024-07-02 PROCEDURE — 2720000010 HC SURG SUPPLY STERILE: Performed by: SURGERY

## 2024-07-02 PROCEDURE — 99223 1ST HOSP IP/OBS HIGH 75: CPT | Performed by: NURSE PRACTITIONER

## 2024-07-02 PROCEDURE — 80053 COMPREHEN METABOLIC PANEL: CPT

## 2024-07-02 PROCEDURE — 2500000003 HC RX 250 WO HCPCS: Performed by: STUDENT IN AN ORGANIZED HEALTH CARE EDUCATION/TRAINING PROGRAM

## 2024-07-02 PROCEDURE — 83735 ASSAY OF MAGNESIUM: CPT

## 2024-07-02 PROCEDURE — 2580000003 HC RX 258

## 2024-07-02 PROCEDURE — 2709999900 HC NON-CHARGEABLE SUPPLY: Performed by: SURGERY

## 2024-07-02 PROCEDURE — 6360000004 HC RX CONTRAST MEDICATION: Performed by: STUDENT IN AN ORGANIZED HEALTH CARE EDUCATION/TRAINING PROGRAM

## 2024-07-02 PROCEDURE — 84484 ASSAY OF TROPONIN QUANT: CPT

## 2024-07-02 PROCEDURE — 6360000002 HC RX W HCPCS: Performed by: STUDENT IN AN ORGANIZED HEALTH CARE EDUCATION/TRAINING PROGRAM

## 2024-07-02 PROCEDURE — 74270 X-RAY XM COLON 1CNTRST STD: CPT

## 2024-07-02 PROCEDURE — 3E033XZ INTRODUCTION OF VASOPRESSOR INTO PERIPHERAL VEIN, PERCUTANEOUS APPROACH: ICD-10-PCS | Performed by: SURGERY

## 2024-07-02 PROCEDURE — 36415 COLL VENOUS BLD VENIPUNCTURE: CPT

## 2024-07-02 PROCEDURE — 0DTF0ZZ RESECTION OF RIGHT LARGE INTESTINE, OPEN APPROACH: ICD-10-PCS | Performed by: SURGERY

## 2024-07-02 PROCEDURE — 2580000003 HC RX 258: Performed by: STUDENT IN AN ORGANIZED HEALTH CARE EDUCATION/TRAINING PROGRAM

## 2024-07-02 PROCEDURE — 2580000003 HC RX 258: Performed by: NURSE ANESTHETIST, CERTIFIED REGISTERED

## 2024-07-02 PROCEDURE — 6360000002 HC RX W HCPCS

## 2024-07-02 PROCEDURE — 6370000000 HC RX 637 (ALT 250 FOR IP): Performed by: INTERNAL MEDICINE

## 2024-07-02 PROCEDURE — 2580000003 HC RX 258: Performed by: NURSE PRACTITIONER

## 2024-07-02 PROCEDURE — 2060000000 HC ICU INTERMEDIATE R&B

## 2024-07-02 PROCEDURE — 81001 URINALYSIS AUTO W/SCOPE: CPT

## 2024-07-02 PROCEDURE — 3700000001 HC ADD 15 MINUTES (ANESTHESIA): Performed by: SURGERY

## 2024-07-02 PROCEDURE — 7100000001 HC PACU RECOVERY - ADDTL 15 MIN: Performed by: SURGERY

## 2024-07-02 PROCEDURE — 99223 1ST HOSP IP/OBS HIGH 75: CPT | Performed by: INTERNAL MEDICINE

## 2024-07-02 PROCEDURE — 2500000003 HC RX 250 WO HCPCS: Performed by: NURSE ANESTHETIST, CERTIFIED REGISTERED

## 2024-07-02 PROCEDURE — 2580000003 HC RX 258: Performed by: SURGERY

## 2024-07-02 PROCEDURE — 93005 ELECTROCARDIOGRAM TRACING: CPT | Performed by: STUDENT IN AN ORGANIZED HEALTH CARE EDUCATION/TRAINING PROGRAM

## 2024-07-02 PROCEDURE — 7100000000 HC PACU RECOVERY - FIRST 15 MIN: Performed by: SURGERY

## 2024-07-02 PROCEDURE — 2500000003 HC RX 250 WO HCPCS

## 2024-07-02 PROCEDURE — 74177 CT ABD & PELVIS W/CONTRAST: CPT

## 2024-07-02 PROCEDURE — 85025 COMPLETE CBC W/AUTO DIFF WBC: CPT

## 2024-07-02 PROCEDURE — 6360000002 HC RX W HCPCS: Performed by: NURSE ANESTHETIST, CERTIFIED REGISTERED

## 2024-07-02 PROCEDURE — 0DBU0ZZ EXCISION OF OMENTUM, OPEN APPROACH: ICD-10-PCS | Performed by: SURGERY

## 2024-07-02 PROCEDURE — 83690 ASSAY OF LIPASE: CPT

## 2024-07-02 PROCEDURE — 6360000002 HC RX W HCPCS: Performed by: SURGERY

## 2024-07-02 PROCEDURE — 3600000012 HC SURGERY LEVEL 2 ADDTL 15MIN: Performed by: SURGERY

## 2024-07-02 PROCEDURE — 83605 ASSAY OF LACTIC ACID: CPT

## 2024-07-02 PROCEDURE — 6360000004 HC RX CONTRAST MEDICATION: Performed by: SURGERY

## 2024-07-02 RX ORDER — METOPROLOL TARTRATE 1 MG/ML
5 INJECTION, SOLUTION INTRAVENOUS EVERY 6 HOURS PRN
Status: DISCONTINUED | OUTPATIENT
Start: 2024-07-02 | End: 2024-07-03

## 2024-07-02 RX ORDER — SODIUM CHLORIDE 0.9 % (FLUSH) 0.9 %
5-40 SYRINGE (ML) INJECTION EVERY 12 HOURS SCHEDULED
Status: DISCONTINUED | OUTPATIENT
Start: 2024-07-02 | End: 2024-07-06 | Stop reason: HOSPADM

## 2024-07-02 RX ORDER — SODIUM CHLORIDE 0.9 % (FLUSH) 0.9 %
5-40 SYRINGE (ML) INJECTION EVERY 12 HOURS SCHEDULED
Status: DISCONTINUED | OUTPATIENT
Start: 2024-07-02 | End: 2024-07-02 | Stop reason: HOSPADM

## 2024-07-02 RX ORDER — DIPHENHYDRAMINE HYDROCHLORIDE 50 MG/ML
12.5 INJECTION INTRAMUSCULAR; INTRAVENOUS
Status: DISCONTINUED | OUTPATIENT
Start: 2024-07-02 | End: 2024-07-02 | Stop reason: HOSPADM

## 2024-07-02 RX ORDER — POTASSIUM CHLORIDE 20 MEQ/1
40 TABLET, EXTENDED RELEASE ORAL PRN
Status: DISCONTINUED | OUTPATIENT
Start: 2024-07-02 | End: 2024-07-06 | Stop reason: HOSPADM

## 2024-07-02 RX ORDER — SUCCINYLCHOLINE/SOD CL,ISO/PF 200MG/10ML
SYRINGE (ML) INTRAVENOUS PRN
Status: DISCONTINUED | OUTPATIENT
Start: 2024-07-02 | End: 2024-07-02 | Stop reason: SDUPTHER

## 2024-07-02 RX ORDER — KETAMINE HCL IN NACL, ISO-OSM 100MG/10ML
SYRINGE (ML) INJECTION PRN
Status: DISCONTINUED | OUTPATIENT
Start: 2024-07-02 | End: 2024-07-02 | Stop reason: SDUPTHER

## 2024-07-02 RX ORDER — ONDANSETRON 2 MG/ML
INJECTION INTRAMUSCULAR; INTRAVENOUS PRN
Status: DISCONTINUED | OUTPATIENT
Start: 2024-07-02 | End: 2024-07-02 | Stop reason: SDUPTHER

## 2024-07-02 RX ORDER — SODIUM CHLORIDE 0.9 % (FLUSH) 0.9 %
10 SYRINGE (ML) INJECTION PRN
Status: COMPLETED | OUTPATIENT
Start: 2024-07-02 | End: 2024-07-02

## 2024-07-02 RX ORDER — SODIUM CHLORIDE 9 MG/ML
INJECTION, SOLUTION INTRAVENOUS CONTINUOUS
Status: DISCONTINUED | OUTPATIENT
Start: 2024-07-02 | End: 2024-07-02

## 2024-07-02 RX ORDER — SODIUM CHLORIDE 9 MG/ML
INJECTION, SOLUTION INTRAVENOUS PRN
Status: DISCONTINUED | OUTPATIENT
Start: 2024-07-02 | End: 2024-07-02 | Stop reason: HOSPADM

## 2024-07-02 RX ORDER — PROPOFOL 10 MG/ML
INJECTION, EMULSION INTRAVENOUS PRN
Status: DISCONTINUED | OUTPATIENT
Start: 2024-07-02 | End: 2024-07-02 | Stop reason: SDUPTHER

## 2024-07-02 RX ORDER — SODIUM CHLORIDE 0.9 % (FLUSH) 0.9 %
10 SYRINGE (ML) INJECTION PRN
Status: DISCONTINUED | OUTPATIENT
Start: 2024-07-02 | End: 2024-07-06 | Stop reason: HOSPADM

## 2024-07-02 RX ORDER — DIPHENHYDRAMINE HYDROCHLORIDE 50 MG/ML
INJECTION INTRAMUSCULAR; INTRAVENOUS PRN
Status: DISCONTINUED | OUTPATIENT
Start: 2024-07-02 | End: 2024-07-02 | Stop reason: SDUPTHER

## 2024-07-02 RX ORDER — HYDRALAZINE HYDROCHLORIDE 20 MG/ML
10 INJECTION INTRAMUSCULAR; INTRAVENOUS EVERY 6 HOURS PRN
Status: DISCONTINUED | OUTPATIENT
Start: 2024-07-02 | End: 2024-07-06 | Stop reason: HOSPADM

## 2024-07-02 RX ORDER — ONDANSETRON 2 MG/ML
4 INJECTION INTRAMUSCULAR; INTRAVENOUS
Status: DISCONTINUED | OUTPATIENT
Start: 2024-07-02 | End: 2024-07-02 | Stop reason: HOSPADM

## 2024-07-02 RX ORDER — FENTANYL CITRATE 0.05 MG/ML
50 INJECTION, SOLUTION INTRAMUSCULAR; INTRAVENOUS EVERY 5 MIN PRN
Status: DISCONTINUED | OUTPATIENT
Start: 2024-07-02 | End: 2024-07-02 | Stop reason: HOSPADM

## 2024-07-02 RX ORDER — DEXAMETHASONE SODIUM PHOSPHATE 4 MG/ML
INJECTION, SOLUTION INTRA-ARTICULAR; INTRALESIONAL; INTRAMUSCULAR; INTRAVENOUS; SOFT TISSUE PRN
Status: DISCONTINUED | OUTPATIENT
Start: 2024-07-02 | End: 2024-07-02 | Stop reason: SDUPTHER

## 2024-07-02 RX ORDER — MORPHINE SULFATE 4 MG/ML
4 INJECTION, SOLUTION INTRAMUSCULAR; INTRAVENOUS ONCE
Status: COMPLETED | OUTPATIENT
Start: 2024-07-02 | End: 2024-07-02

## 2024-07-02 RX ORDER — DEXMEDETOMIDINE HYDROCHLORIDE 4 UG/ML
INJECTION, SOLUTION INTRAVENOUS CONTINUOUS PRN
Status: DISCONTINUED | OUTPATIENT
Start: 2024-07-02 | End: 2024-07-02 | Stop reason: SDUPTHER

## 2024-07-02 RX ORDER — LANOLIN ALCOHOL/MO/W.PET/CERES
3 CREAM (GRAM) TOPICAL NIGHTLY PRN
Status: DISCONTINUED | OUTPATIENT
Start: 2024-07-02 | End: 2024-07-06 | Stop reason: HOSPADM

## 2024-07-02 RX ORDER — ACETAMINOPHEN 325 MG/1
650 TABLET ORAL EVERY 6 HOURS PRN
Status: DISCONTINUED | OUTPATIENT
Start: 2024-07-02 | End: 2024-07-06 | Stop reason: HOSPADM

## 2024-07-02 RX ORDER — SODIUM CHLORIDE, SODIUM LACTATE, POTASSIUM CHLORIDE, CALCIUM CHLORIDE 600; 310; 30; 20 MG/100ML; MG/100ML; MG/100ML; MG/100ML
INJECTION, SOLUTION INTRAVENOUS CONTINUOUS
Status: DISCONTINUED | OUTPATIENT
Start: 2024-07-02 | End: 2024-07-05

## 2024-07-02 RX ORDER — ONDANSETRON 2 MG/ML
4 INJECTION INTRAMUSCULAR; INTRAVENOUS ONCE
Status: COMPLETED | OUTPATIENT
Start: 2024-07-02 | End: 2024-07-02

## 2024-07-02 RX ORDER — ACETAMINOPHEN 650 MG/1
650 SUPPOSITORY RECTAL EVERY 6 HOURS PRN
Status: DISCONTINUED | OUTPATIENT
Start: 2024-07-02 | End: 2024-07-02

## 2024-07-02 RX ORDER — OXYCODONE HYDROCHLORIDE 5 MG/1
5 TABLET ORAL EVERY 6 HOURS PRN
Status: DISCONTINUED | OUTPATIENT
Start: 2024-07-02 | End: 2024-07-06 | Stop reason: HOSPADM

## 2024-07-02 RX ORDER — POLYETHYLENE GLYCOL 3350 17 G/17G
17 POWDER, FOR SOLUTION ORAL DAILY PRN
Status: DISCONTINUED | OUTPATIENT
Start: 2024-07-02 | End: 2024-07-02

## 2024-07-02 RX ORDER — SODIUM CHLORIDE 0.9 % (FLUSH) 0.9 %
5-40 SYRINGE (ML) INJECTION PRN
Status: DISCONTINUED | OUTPATIENT
Start: 2024-07-02 | End: 2024-07-02 | Stop reason: HOSPADM

## 2024-07-02 RX ORDER — SODIUM CHLORIDE, SODIUM LACTATE, POTASSIUM CHLORIDE, CALCIUM CHLORIDE 600; 310; 30; 20 MG/100ML; MG/100ML; MG/100ML; MG/100ML
INJECTION, SOLUTION INTRAVENOUS CONTINUOUS PRN
Status: DISCONTINUED | OUTPATIENT
Start: 2024-07-02 | End: 2024-07-02 | Stop reason: SDUPTHER

## 2024-07-02 RX ORDER — MIDAZOLAM HYDROCHLORIDE 1 MG/ML
INJECTION INTRAMUSCULAR; INTRAVENOUS PRN
Status: DISCONTINUED | OUTPATIENT
Start: 2024-07-02 | End: 2024-07-02 | Stop reason: SDUPTHER

## 2024-07-02 RX ORDER — FENTANYL CITRATE 50 UG/ML
INJECTION, SOLUTION INTRAMUSCULAR; INTRAVENOUS PRN
Status: DISCONTINUED | OUTPATIENT
Start: 2024-07-02 | End: 2024-07-02 | Stop reason: SDUPTHER

## 2024-07-02 RX ORDER — METOCLOPRAMIDE HYDROCHLORIDE 5 MG/ML
INJECTION INTRAMUSCULAR; INTRAVENOUS PRN
Status: DISCONTINUED | OUTPATIENT
Start: 2024-07-02 | End: 2024-07-02 | Stop reason: SDUPTHER

## 2024-07-02 RX ORDER — 0.9 % SODIUM CHLORIDE 0.9 %
1000 INTRAVENOUS SOLUTION INTRAVENOUS ONCE
Status: COMPLETED | OUTPATIENT
Start: 2024-07-02 | End: 2024-07-02

## 2024-07-02 RX ORDER — ROCURONIUM BROMIDE 10 MG/ML
INJECTION, SOLUTION INTRAVENOUS PRN
Status: DISCONTINUED | OUTPATIENT
Start: 2024-07-02 | End: 2024-07-02 | Stop reason: SDUPTHER

## 2024-07-02 RX ORDER — BISACODYL 10 MG
10 SUPPOSITORY, RECTAL RECTAL DAILY PRN
Status: DISCONTINUED | OUTPATIENT
Start: 2024-07-02 | End: 2024-07-02

## 2024-07-02 RX ORDER — ONDANSETRON 2 MG/ML
4 INJECTION INTRAMUSCULAR; INTRAVENOUS EVERY 6 HOURS PRN
Status: DISCONTINUED | OUTPATIENT
Start: 2024-07-02 | End: 2024-07-06 | Stop reason: HOSPADM

## 2024-07-02 RX ORDER — SODIUM CHLORIDE 9 MG/ML
INJECTION, SOLUTION INTRAVENOUS PRN
Status: DISCONTINUED | OUTPATIENT
Start: 2024-07-02 | End: 2024-07-06 | Stop reason: HOSPADM

## 2024-07-02 RX ORDER — POTASSIUM CHLORIDE 7.45 MG/ML
10 INJECTION INTRAVENOUS PRN
Status: DISCONTINUED | OUTPATIENT
Start: 2024-07-02 | End: 2024-07-06 | Stop reason: HOSPADM

## 2024-07-02 RX ORDER — MORPHINE SULFATE 4 MG/ML
4 INJECTION, SOLUTION INTRAMUSCULAR; INTRAVENOUS EVERY 4 HOURS PRN
Status: DISCONTINUED | OUTPATIENT
Start: 2024-07-02 | End: 2024-07-06 | Stop reason: HOSPADM

## 2024-07-02 RX ORDER — LIDOCAINE HYDROCHLORIDE 20 MG/ML
INJECTION, SOLUTION EPIDURAL; INFILTRATION; INTRACAUDAL; PERINEURAL PRN
Status: DISCONTINUED | OUTPATIENT
Start: 2024-07-02 | End: 2024-07-02 | Stop reason: SDUPTHER

## 2024-07-02 RX ORDER — MAGNESIUM SULFATE HEPTAHYDRATE 40 MG/ML
2000 INJECTION, SOLUTION INTRAVENOUS PRN
Status: DISCONTINUED | OUTPATIENT
Start: 2024-07-02 | End: 2024-07-06 | Stop reason: HOSPADM

## 2024-07-02 RX ORDER — PHENYLEPHRINE HYDROCHLORIDE 10 MG/ML
INJECTION INTRAVENOUS PRN
Status: DISCONTINUED | OUTPATIENT
Start: 2024-07-02 | End: 2024-07-02 | Stop reason: SDUPTHER

## 2024-07-02 RX ORDER — HYDROMORPHONE HYDROCHLORIDE 2 MG/ML
INJECTION, SOLUTION INTRAMUSCULAR; INTRAVENOUS; SUBCUTANEOUS PRN
Status: DISCONTINUED | OUTPATIENT
Start: 2024-07-02 | End: 2024-07-02 | Stop reason: SDUPTHER

## 2024-07-02 RX ORDER — ONDANSETRON 4 MG/1
4 TABLET, ORALLY DISINTEGRATING ORAL EVERY 8 HOURS PRN
Status: DISCONTINUED | OUTPATIENT
Start: 2024-07-02 | End: 2024-07-06 | Stop reason: HOSPADM

## 2024-07-02 RX ORDER — 0.9 % SODIUM CHLORIDE 0.9 %
100 INTRAVENOUS SOLUTION INTRAVENOUS ONCE
Status: COMPLETED | OUTPATIENT
Start: 2024-07-02 | End: 2024-07-02

## 2024-07-02 RX ORDER — SCOLOPAMINE TRANSDERMAL SYSTEM 1 MG/1
1 PATCH, EXTENDED RELEASE TRANSDERMAL
Status: DISCONTINUED | OUTPATIENT
Start: 2024-07-02 | End: 2024-07-05

## 2024-07-02 RX ORDER — MAGNESIUM SULFATE HEPTAHYDRATE 40 MG/ML
2000 INJECTION, SOLUTION INTRAVENOUS ONCE
Status: COMPLETED | OUTPATIENT
Start: 2024-07-02 | End: 2024-07-02

## 2024-07-02 RX ADMIN — SODIUM CHLORIDE, POTASSIUM CHLORIDE, SODIUM LACTATE AND CALCIUM CHLORIDE: 600; 310; 30; 20 INJECTION, SOLUTION INTRAVENOUS at 12:57

## 2024-07-02 RX ADMIN — MORPHINE SULFATE 4 MG: 4 INJECTION, SOLUTION INTRAMUSCULAR; INTRAVENOUS at 20:05

## 2024-07-02 RX ADMIN — LIDOCAINE HYDROCHLORIDE 100 MG: 20 INJECTION, SOLUTION EPIDURAL; INFILTRATION; INTRACAUDAL; PERINEURAL at 11:48

## 2024-07-02 RX ADMIN — ROCURONIUM BROMIDE 10 MG: 10 INJECTION, SOLUTION INTRAVENOUS at 11:48

## 2024-07-02 RX ADMIN — PHENYLEPHRINE HYDROCHLORIDE 50 MCG: 10 INJECTION INTRAVENOUS at 12:04

## 2024-07-02 RX ADMIN — FENTANYL CITRATE 50 MCG: 50 INJECTION, SOLUTION INTRAMUSCULAR; INTRAVENOUS at 11:48

## 2024-07-02 RX ADMIN — MIDAZOLAM 2 MG: 1 INJECTION INTRAMUSCULAR; INTRAVENOUS at 11:43

## 2024-07-02 RX ADMIN — FENTANYL CITRATE 50 MCG: 50 INJECTION, SOLUTION INTRAMUSCULAR; INTRAVENOUS at 13:08

## 2024-07-02 RX ADMIN — DIATRIZOATE MEGLUMINE AND DIATRIZOATE SODIUM 1000 ML: 660; 100 LIQUID ORAL; RECTAL at 09:36

## 2024-07-02 RX ADMIN — SODIUM CHLORIDE 100 ML: 9 INJECTION, SOLUTION INTRAVENOUS at 04:53

## 2024-07-02 RX ADMIN — SODIUM CHLORIDE, POTASSIUM CHLORIDE, SODIUM LACTATE AND CALCIUM CHLORIDE: 600; 310; 30; 20 INJECTION, SOLUTION INTRAVENOUS at 13:25

## 2024-07-02 RX ADMIN — SODIUM CHLORIDE 1000 ML: 9 INJECTION, SOLUTION INTRAVENOUS at 04:34

## 2024-07-02 RX ADMIN — SODIUM CHLORIDE: 9 INJECTION, SOLUTION INTRAVENOUS at 07:11

## 2024-07-02 RX ADMIN — DIPHENHYDRAMINE HYDROCHLORIDE 12.5 MG: 50 INJECTION INTRAMUSCULAR; INTRAVENOUS at 12:00

## 2024-07-02 RX ADMIN — HYDROMORPHONE HYDROCHLORIDE 0.4 MG: 2 INJECTION, SOLUTION INTRAMUSCULAR; INTRAVENOUS; SUBCUTANEOUS at 12:30

## 2024-07-02 RX ADMIN — PIPERACILLIN AND TAZOBACTAM 3375 MG: 3; .375 INJECTION, POWDER, LYOPHILIZED, FOR SOLUTION INTRAVENOUS at 14:06

## 2024-07-02 RX ADMIN — MORPHINE SULFATE 4 MG: 4 INJECTION, SOLUTION INTRAMUSCULAR; INTRAVENOUS at 15:59

## 2024-07-02 RX ADMIN — Medication 100 MG: at 11:48

## 2024-07-02 RX ADMIN — SODIUM CHLORIDE, POTASSIUM CHLORIDE, SODIUM LACTATE AND CALCIUM CHLORIDE: 600; 310; 30; 20 INJECTION, SOLUTION INTRAVENOUS at 11:48

## 2024-07-02 RX ADMIN — Medication 30 MG: at 12:00

## 2024-07-02 RX ADMIN — HYDROMORPHONE HYDROCHLORIDE 0.5 MG: 1 INJECTION, SOLUTION INTRAMUSCULAR; INTRAVENOUS; SUBCUTANEOUS at 13:46

## 2024-07-02 RX ADMIN — DEXAMETHASONE SODIUM PHOSPHATE 8 MG: 4 INJECTION, SOLUTION INTRAMUSCULAR; INTRAVENOUS at 12:00

## 2024-07-02 RX ADMIN — HYDROMORPHONE HYDROCHLORIDE 0.6 MG: 2 INJECTION, SOLUTION INTRAMUSCULAR; INTRAVENOUS; SUBCUTANEOUS at 12:15

## 2024-07-02 RX ADMIN — SODIUM CHLORIDE, PRESERVATIVE FREE 10 ML: 5 INJECTION INTRAVENOUS at 04:53

## 2024-07-02 RX ADMIN — ROCURONIUM BROMIDE 10 MG: 10 INJECTION, SOLUTION INTRAVENOUS at 12:30

## 2024-07-02 RX ADMIN — POTASSIUM CHLORIDE: 2 INJECTION, SOLUTION, CONCENTRATE INTRAVENOUS at 07:26

## 2024-07-02 RX ADMIN — DEXMEDETOMIDINE HYDROCHLORIDE 0.8 MCG/KG/HR: 400 INJECTION INTRAVENOUS at 12:00

## 2024-07-02 RX ADMIN — HYDROMORPHONE HYDROCHLORIDE 1 MG: 1 INJECTION INTRAMUSCULAR; INTRAVENOUS; SUBCUTANEOUS at 07:01

## 2024-07-02 RX ADMIN — METOPROLOL TARTRATE 5 MG: 1 INJECTION, SOLUTION INTRAVENOUS at 17:52

## 2024-07-02 RX ADMIN — PIPERACILLIN AND TAZOBACTAM 3375 MG: 3; .375 INJECTION, POWDER, LYOPHILIZED, FOR SOLUTION INTRAVENOUS at 22:01

## 2024-07-02 RX ADMIN — PROPOFOL 150 MG: 10 INJECTION, EMULSION INTRAVENOUS at 11:48

## 2024-07-02 RX ADMIN — PHENYLEPHRINE HYDROCHLORIDE 50 MCG: 10 INJECTION INTRAVENOUS at 11:54

## 2024-07-02 RX ADMIN — FENTANYL CITRATE 50 MCG: 50 INJECTION, SOLUTION INTRAMUSCULAR; INTRAVENOUS at 13:05

## 2024-07-02 RX ADMIN — ONDANSETRON 4 MG: 2 INJECTION INTRAMUSCULAR; INTRAVENOUS at 04:34

## 2024-07-02 RX ADMIN — IOPAMIDOL 75 ML: 755 INJECTION, SOLUTION INTRAVENOUS at 04:53

## 2024-07-02 RX ADMIN — FENTANYL CITRATE 50 MCG: 50 INJECTION, SOLUTION INTRAMUSCULAR; INTRAVENOUS at 12:00

## 2024-07-02 RX ADMIN — MAGNESIUM SULFATE HEPTAHYDRATE 2000 MG: 40 INJECTION, SOLUTION INTRAVENOUS at 05:24

## 2024-07-02 RX ADMIN — ONDANSETRON 4 MG: 2 INJECTION INTRAMUSCULAR; INTRAVENOUS at 16:08

## 2024-07-02 RX ADMIN — METOCLOPRAMIDE 10 MG: 5 INJECTION, SOLUTION INTRAMUSCULAR; INTRAVENOUS at 12:00

## 2024-07-02 RX ADMIN — MORPHINE SULFATE 4 MG: 4 INJECTION, SOLUTION INTRAMUSCULAR; INTRAVENOUS at 04:37

## 2024-07-02 RX ADMIN — ROCURONIUM BROMIDE 40 MG: 10 INJECTION, SOLUTION INTRAVENOUS at 12:00

## 2024-07-02 RX ADMIN — PIPERACILLIN AND TAZOBACTAM 4500 MG: 4; .5 INJECTION, POWDER, LYOPHILIZED, FOR SOLUTION INTRAVENOUS at 11:50

## 2024-07-02 RX ADMIN — ONDANSETRON 4 MG: 2 INJECTION INTRAMUSCULAR; INTRAVENOUS at 13:01

## 2024-07-02 ASSESSMENT — PAIN DESCRIPTION - DESCRIPTORS
DESCRIPTORS: ACHING
DESCRIPTORS: TIGHTNESS
DESCRIPTORS: ACHING
DESCRIPTORS: SORE;SHARP

## 2024-07-02 ASSESSMENT — PAIN DESCRIPTION - FREQUENCY: FREQUENCY: CONTINUOUS

## 2024-07-02 ASSESSMENT — PAIN DESCRIPTION - ORIENTATION
ORIENTATION: MID

## 2024-07-02 ASSESSMENT — ENCOUNTER SYMPTOMS
VOMITING: 0
CONSTIPATION: 1
ABDOMINAL DISTENTION: 1
ABDOMINAL PAIN: 0
EYE DISCHARGE: 0
BACK PAIN: 1
COUGH: 0
ABDOMINAL PAIN: 1
EYE PAIN: 0
APNEA: 0
SHORTNESS OF BREATH: 0
CHOKING: 0
PHOTOPHOBIA: 0
NAUSEA: 1

## 2024-07-02 ASSESSMENT — PAIN SCALES - GENERAL
PAINLEVEL_OUTOF10: 7
PAINLEVEL_OUTOF10: 7
PAINLEVEL_OUTOF10: 1
PAINLEVEL_OUTOF10: 8
PAINLEVEL_OUTOF10: 8
PAINLEVEL_OUTOF10: 10
PAINLEVEL_OUTOF10: 8

## 2024-07-02 ASSESSMENT — PAIN - FUNCTIONAL ASSESSMENT
PAIN_FUNCTIONAL_ASSESSMENT: PREVENTS OR INTERFERES SOME ACTIVE ACTIVITIES AND ADLS
PAIN_FUNCTIONAL_ASSESSMENT: 0-10
PAIN_FUNCTIONAL_ASSESSMENT: FACE, LEGS, ACTIVITY, CRY, AND CONSOLABILITY (FLACC)

## 2024-07-02 ASSESSMENT — PAIN DESCRIPTION - ONSET: ONSET: AWAKENED FROM SLEEP

## 2024-07-02 ASSESSMENT — PAIN DESCRIPTION - LOCATION
LOCATION: ABDOMEN

## 2024-07-02 ASSESSMENT — PAIN DESCRIPTION - PAIN TYPE
TYPE: SURGICAL PAIN
TYPE: SURGICAL PAIN

## 2024-07-02 NOTE — ED NOTES
Report given to DENA Rivas from U.   Report method by phone   The following was reviewed with receiving RN:   Current vital signs:  BP (!) 141/84   Pulse (!) 108   Temp 98.3 °F (36.8 °C) (Oral)   Resp 17   Ht 1.549 m (5' 1\")   Wt 49.9 kg (110 lb)   SpO2 96%   BMI 20.78 kg/m²                MEWS Score: 3     Any medication or safety alerts were reviewed. Any pending diagnostics and notifications were also reviewed, as well as any safety concerns or issues, abnormal labs, abnormal imaging, and abnormal assessment findings. Questions were answered.

## 2024-07-02 NOTE — PROGRESS NOTES
[facility] Internal Medicine  Oscar Bueno MD; Juan Miguel Heller MD; Surendra Little MD; MD Yanna Portillo MD; Urvashi Meredith MD  [facility] Internal Medicine   IN-PATIENT SERVICE  [facility]                 Date:   7/2/2024  Patientname:  Rajani Iglesias  Date of admission:  7/2/2024 12:55 AM  MRN:   [id number]  Account:  [id number]  YOB: 1960  PCP:    Giovanna Brasher MD  Room:   15/15  Code Status:    Prior      Chief Complaint:     Chief Complaint   Patient presents with    Abdominal Pain     X 3-4 days    Bloated       History of Present Illness:     Rajani Iglesias is a 63 y.o. Non- / non  female who presents with Abdominal Pain (X 3-4 days) and Bloated and is admitted to the hospital for the management of Large bowel obstruction (HCC). Dr. Bustillo with general surgery was consulted. Orders placed for soap suds enema followed by barium enema. NPO. Patient also has elevated troponin 88, 79. Cardiology consulted.     Plan to admit to progressive unit.     CT ABDOMEN PELVIS W IV CONTRAST Additional Contrast? None    Result Date: 7/2/2024  1. Large bowel obstruction with transition point seen in the redundant transverse colon seen in the left lower quadrant.  Underlying stricture malignancy difficult to exclude. 2. Distended gallbladder with a small amount of perihepatic ascites. Recommend clinical correlation for possibility of cholecystitis.       CASSANDRA PATEL - NP  7/2/2024  6:54 AM      Please note that this chart was generated using voice recognition Dragon dictation software.  Although every effort was made to ensure the accuracy of this automated transcription, some errors in transcription may have occurred.    [facility]  [address] [place] [address].   Phone [phone]

## 2024-07-02 NOTE — CARE COORDINATION
Case Management Assessment  Initial Evaluation    Date/Time of Evaluation: 7/2/2024 1:45 PM  Assessment Completed by: Summer Uriostegui RN    If patient is discharged prior to next notation, then this note serves as note for discharge by case management.    Patient Name: Rajani Iglesias                   YOB: 1960  Diagnosis: Large bowel obstruction (HCC) [K56.609]                   Date / Time: 7/2/2024 12:55 AM    Patient Admission Status: Inpatient   Readmission Risk (Low < 19, Mod (19-27), High > 27): Readmission Risk Score: 14.3    Current PCP: Giovanna Brasher MD  PCP verified by CM? Yes    Chart Reviewed: Yes      History Provided by: Patient  Patient Orientation: Alert and Oriented    Patient Cognition: Alert    Hospitalization in the last 30 days (Readmission):  No    If yes, Readmission Assessment in  Navigator will be completed.    Advance Directives:      Code Status: Full Code   Patient's Primary Decision Maker is:      Primary Decision Maker: Mary Tejeda - Child - [phone]    Secondary Decision Maker: LG QUINTERO - Domestic Partner - [phone]    Discharge Planning:    Patient lives with: Spouse/Significant Other Type of Home: House  Primary Care Giver: Self  Patient Support Systems include: Spouse/Significant Other, Children   Current Financial resources: Medicare  Current community resources: None  Current services prior to admission: Other (Comment), Durable Medical Equipment ([facility], for her Methadone)            Current DME: Cane, Walker, Wheelchair (GB/SC)            Type of Home Care services:  None    ADLS  Prior functional level: Independent in ADLs/IADLs, Assistance with the following:, Cooking, Housework, Shopping  Current functional level: Independent in ADLs/IADLs, Assistance with the following:, Cooking, Housework, Shopping    PT AM-PAC:   /24  OT AM-PAC:   /24    Family can provide assistance at DC: Yes  Would you like Case Management to  DENA Uriostegui  Case Management Department  Ph:  Fax:

## 2024-07-02 NOTE — ANESTHESIA PRE PROCEDURE
G83.4    Anemia, normocytic normochromic D64.9    Iron deficiency E61.1    Pseudomonas urinary tract infection N39.0, B96.5    Hypocalcemia E83.51    Cauda equina syndrome ([facility]) G83.4    Hemiplegia ([facility]) G81.90    Back pain M54.9    Incomplete quadriplegia at C5-6 level ([facility]) G82.54    Denhoff neck deformity of cervical spine M43.8X2    Acute cystitis without hematuria N30.00    Spinal cord injury, cervical region, sequela ([facility]) S14.109S    Fatigue R53.83    Decreased appetite R63.0    Depression F32.A    Diastolic heart failure ([facility]) I50.30    Delirium R41.0    Quadriplegia, functional ([facility]) R53.2    Abnormal CT of brain R90.89    Encephalopathy G93.40    Tetraparesis ([facility]) G82.50    History of stroke Z86.73    Large bowel obstruction ([facility]) K56.609       Past Medical History:        Diagnosis Date    Anxiety     Arthritis     At maximum risk for fall 12/29/2021    caudal equina syndrome and cervical stenosis    Breast cancer ([facility])     Brief psychotic disorder ([facility]) 01/05/2024    Cancer ([facility])     right breast    Cauda equina syndrome ([facility]) 12/29/2021    neuropathy, mobility difficulty, incontinance    Cerebrovascular accident ([facility]) 01/01/2024    Cervical stenosis of spine 12/29/2021    GERD (gastroesophageal reflux disease)     History of stomach ulcers     History of therapeutic radiation     Hx antineoplastic chemo     Hypertension     Dr. MARY LOU Brasher    Hypothyroidism     Lumbar neuritis     NSTEMI (non-ST elevated myocardial infarction) ([facility]) 12/13/2023    Post laminectomy syndrome     Spinal deformity 11/2021    cervical and lumbar    Thyroid disease     Uses wheelchair     Wears dentures     Wellness examination     Dr. MARY LOU Brasher       Past Surgical History:        Procedure Laterality Date    BACK SURGERY      lower back x 3, cervical- x 1: C4- C6, 11/4/2014     BREAST SURGERY Right     mastectomy with reconstruction    CERVICAL FUSION N/A 4/12/2022    C5 CORPECTOMY, USE OF INTRAOPERATIVE CERVICAL TRACTION performed

## 2024-07-02 NOTE — PROGRESS NOTES
Patient admitted to PCU 2125. No signs of distress noted. She was oriented to the room and the call light is within reach. Telemetry applied and vitals obtained.

## 2024-07-02 NOTE — CONSULTS
BMP:   Recent Labs     07/02/24  0402   *   K 3.4*   CO2 26   BUN 17   CREATININE 0.7   LABGLOM >90   GLUCOSE 102*     BNP: No results for input(s): \"BNP\" in the last 72 hours.  PT/INR: No results for input(s): \"PROTIME\", \"INR\" in the last 72 hours.  APTT:No results for input(s): \"APTT\" in the last 72 hours.  CARDIAC ENZYMES:No results for input(s): \"CKTOTAL\", \"CKMB\", \"CKMBINDEX\", \"TROPONINI\" in the last 72 hours.  FASTING LIPID PANEL:  Lab Results   Component Value Date/Time    HDL 46 12/25/2023 07:12 AM    TRIG 89 12/25/2023 07:12 AM     LIVER PROFILE:  Recent Labs     07/02/24  0402   AST 32*   ALT 35*       IMPRESSION:    Patient Active Problem List   Diagnosis    Stenosis of cervical spine with myelopathy (HCC)    Uncontrolled hypertension    Hypothyroidism    Lumbar radiculopathy, chronic    Lumbar neuritis    Post laminectomy syndrome    Hypokalemia    Pain of right hip joint    Post-menopausal osteoporosis    Chronic gastritis without bleeding    Elevated CEA    Esophageal dysphagia    Hypotension due to drugs    Ulcerative esophagitis    Weight loss, abnormal    Cervical myelopathy (HCC)    Lumbar stenosis with neurogenic claudication    Spinal deformity    Anxiety about health    Cauda equina compression (HCC)    Anemia, normocytic normochromic    Iron deficiency    Pseudomonas urinary tract infection    Hypocalcemia    Cauda equina syndrome (HCC)    Hemiplegia (HCC)    Back pain    Incomplete quadriplegia at C5-6 level (HCC)    [place] neck deformity of cervical spine    Acute cystitis without hematuria    Spinal cord injury, cervical region, sequela (HCC)    Fatigue    Decreased appetite    Depression    Diastolic heart failure (HCC)    Delirium    Quadriplegia, functional (HCC)    Abnormal CT of brain    Encephalopathy    Tetraparesis (HCC)    History of stroke    Large bowel obstruction (HCC)     Trop elevation, down-mcginnis trend with no acute ECG changes.   Large bowel obstruction  Preserved LVEF  on echo 12/2023 without WMA or diastolic dysfunction  Hypokalemia  Hypomagnesemia  Chronic pain syndrome on daily Methadone  HTN    RECOMMENDATIONS:  Stable. She denies any chest pain or pressure. Trop elevation flat/down-mcginnis trend in setting of acute bowel obstruction. No acute ECG changes.  Echo from Dec reviewed as above with normal LVEF and no significant WMA or valvular disease  Recommend Mg+ replacement (2gm given this AM)  Moderate risk for surgical procedure from CV standpoint  BP currently stable. ON Cardizem and Lisinopril at home. Will follow and resume when able. Can use IV Hydralazine PRN for SBP > 160mmHg      Discussed with patient and Nurse    Angella Glass, APRN - CNP    [facility]           [phone]

## 2024-07-02 NOTE — ANESTHESIA POSTPROCEDURE EVALUATION
Department of Anesthesiology  Postprocedure Note    Patient: Rajani Iglesias  MRN: [id number]  YOB: 1960  Date of evaluation: 7/2/2024    Procedure Summary       Date: 07/02/24 Room / Location: [address]    Anesthesia Start: 1144 Anesthesia Stop: 1315    Procedure: LAPAROTOMY EXPLORATORY W/COLECTOMY, ILEOSTOMY RIGHT LOWER QUADRANT (Abdomen) Diagnosis:       Colon obstruction (HCC)      (Colon obstruction (HCC) [K56.609])    Surgeons: Saad Bustillo DO Responsible Provider: Masha Philippe MD    Anesthesia Type: general ASA Status: 3 - Emergent            Anesthesia Type: No value filed.    Nadeem Phase I: Nadeem Score: 6    Nadeem Phase II:      Anesthesia Post Evaluation    Comments: POST- ANESTHESIA EVALUATION       Pt Name: Rajani Iglesias  MRN: [id number]  YOB: 1960  Date of evaluation: 7/2/2024  Time:  4:18 PM      BP (!) 167/99   Pulse (!) 105   Temp 98.3 °F (36.8 °C) (Oral)   Resp 24   Ht 1.549 m (5' 0.98\")   Wt 49.9 kg (110 lb)   SpO2 96%   BMI 20.80 kg/m²      Consciousness Level  Awake  Cardiopulmonary Status  Stable  Pain Adequately Treated YES  Nausea / Vomiting  NO  Adequate Hydration  YES  Anesthesia Related Complications NONE      Electronically signed by Cirilo Mattson MD on 7/2/2024 at 4:18 PM           No notable events documented.

## 2024-07-02 NOTE — PROGRESS NOTES
Gastroview enema demonstrates continued cut off and obstruction of the colon.  Plan is for colectomy and colostomy.  Risks options benefits of surgery were thoroughly discussed with the patient who understands and agrees to proceed.  Consent is signed and witnessed and questions were answered.

## 2024-07-02 NOTE — OP NOTE
Operative Note      Patient: Rajani Iglesias  YOB: 1960  MRN: [id number]    Date of Procedure: 7/2/2024    Pre-Op Diagnosis Codes:     * Colon obstruction (HCC) [K56.609]    Post-Op Diagnosis: Same       Procedure: Right colectomy and partial omentectomy with ileostomy creation    Surgeon(s):  Saad Bustillo DO    Assistant:   Resident: Divya Mckeon DO; Ruslan Villavicencio DO    Anesthesia: General    Estimated Blood Loss (mL): Minimal    Complications: None    Specimens:   ID Type Source Tests Collected by Time Destination   1 : urine for mario placement Urine Urine, indwelling catheter URINALYSIS WITH REFLEX TO CULTURE Saad Bustillo DO 7/2/2024 1301    A : right colon and partial omentum Tissue Colon SURGICAL PATHOLOGY Saad Bustillo,  7/2/2024 1225        Implants:  * No implants in log *      Drains:   Colostomy RLQ (Active)   Stomal Appliance 1 piece;Clean, dry & intact 07/02/24 1250   Stoma  Assessment [place] 07/02/24 1250   Peristomal Assessment Clean, dry & intact 07/02/24 1250   Mucocutaneous Junction Intact 07/02/24 1250   Treatment Other (Comment) 07/02/24 1250       Urinary Catheter 07/02/24 2 Way (Active)   Urine Color Yellow 07/02/24 1206   Urine Appearance Clear 07/02/24 1206   Collection Container Standard 07/02/24 1206   Status Draining 07/02/24 1206       Findings:  Infection Present At Time Of Surgery (PATOS) (choose all levels that have infection present):  - Organ Space infection (below fascia) present as evidenced by fluid consistent with infection and yellow viscous fluid consistent with infection  Other Findings: Distal transverse colon with impacted obstructing stool ball, proximally dilated transverse and right colon with serosal tears due to distention.  Omental adhesion to right lower quadrant.  Extended right colectomy performed, end ileostomy created in right lower quadrant    Detailed Description of Procedure:   Patient was brought to the operating room and assisted

## 2024-07-02 NOTE — PROGRESS NOTES
Pts ring placed in cup in pre op by pt, cup labeled with pt sticker and placed in pt belonging bag on pt cart and taken with pt to OR

## 2024-07-02 NOTE — ED NOTES
Mode of arrival (squad #, walk in, police, etc) : walk-in        Chief complaint(s): Abdominal pain and distention        Arrival Note (brief scenario, treatment PTA, etc).: Symptoms started 3-4 days ago. Last BM was 4 days ago.        C= \"Have you ever felt that you should Cut down on your drinking?\"  No  A= \"Have people Annoyed you by criticizing your drinking?\"  No  G= \"Have you ever felt bad or Guilty about your drinking?\"  No  E= \"Have you ever had a drink as an Eye-opener first thing in the morning to steady your nerves or to help a hangover?\"  No      Deferred []      Reason for deferring: N/A    *If yes to two or more: probable alcohol abuse.*

## 2024-07-02 NOTE — ED PROVIDER NOTES
[facility]  Emergency Department Encounter  Emergency Medicine Physician     Pt Name: Rajani Iglesias  MRN: [id number]  Birthdate 1960  Date of evaluation: 7/2/24  PCP:  Giovanna Brasher MD    CHIEF COMPLAINT       Chief Complaint   Patient presents with    Abdominal Pain     X 3-4 days    Bloated       HISTORY OF PRESENT ILLNESS  (Location/Symptom, Timing/Onset, Context/Setting, Quality, Duration, Modifying Factors, Severity.)    Rajani Iglesias is a 63 y.o. female who presents with abdominal pain ongoing for the past few days.  States she has not had a bowel movement in the past week.  Endorses chronic opiate use secondary to chronic back pain.  Has never had a bowel obstruction before has never had any abdominal surgeries before.  Endorses nausea, episode of vomiting yesterday.  Denies any dysuria or hematuria.        PAST MEDICAL / SURGICAL / SOCIAL / FAMILY HISTORY    has a past medical history of Anxiety, Arthritis, At maximum risk for fall, Breast cancer (HCC), Brief psychotic disorder (HCC), Cancer (HCC), Cauda equina syndrome (HCC), Cerebrovascular accident (HCC), Cervical stenosis of spine, GERD (gastroesophageal reflux disease), History of stomach ulcers, History of therapeutic radiation, Hx antineoplastic chemo, Hypertension, Hypothyroidism, Lumbar neuritis, NSTEMI (non-ST elevated myocardial infarction) (HCC), Post laminectomy syndrome, Spinal deformity, Thyroid disease, Uses wheelchair, Wears dentures, and Wellness examination.     has a past surgical history that includes hernia repair (Bilateral); Breast surgery (Right); Mastectomy, radical; Hysterectomy, total abdominal; Lumbar spine surgery (N/A, 12/30/2021); back surgery; Colonoscopy (about 2018); other surgical history (04/12/2022); cervical fusion (N/A, 4/12/2022); and cervical fusion (N/A, 4/12/2022).    Social History     Socioeconomic History    Marital status:      Spouse name: Not on file    Number of children:  Not on file    Years of education: Not on file    Highest education level: Not on file   Occupational History    Not on file   Tobacco Use    Smoking status: Former     Current packs/day: 0.00     Average packs/day: 0.5 packs/day for 30.0 years (15.0 ttl pk-yrs)     Types: Cigarettes     Start date: 1984     Quit date: 2014     Years since quitting: 10.5    Smokeless tobacco: Never   Vaping Use    Vaping Use: Every day    Substances: Nicotine   Substance and Sexual Activity    Alcohol use: Yes     Alcohol/week: 0.0 standard drinks of alcohol     Comment: weekend at times    Drug use: Yes     Types: Prescription     Comment: Methadone from [facility]    Sexual activity: Not on file   Other Topics Concern    Not on file   Social History Narrative    Not on file     Social Determinants of Health     Financial Resource Strain: Low Risk  (1/5/2024)    Overall Financial Resource Strain (CARDIA)     Difficulty of Paying Living Expenses: Not hard at all   Food Insecurity: No Food Insecurity (1/5/2024)    Hunger Vital Sign     Worried About Running Out of Food in the Last Year: Never true     Ran Out of Food in the Last Year: Never true   Transportation Needs: No Transportation Needs (1/5/2024)    PRAPARE - Transportation     Lack of Transportation (Medical): No     Lack of Transportation (Non-Medical): No   Physical Activity: Insufficiently Active (3/24/2023)    Exercise Vital Sign     Days of Exercise per Week: 1 day     Minutes of Exercise per Session: 10 min   Stress: No Stress Concern Present (5/3/2022)    [employer] [place] [employer] - Occupational Stress Questionnaire     Feeling of Stress : Only a little   Social Connections: Socially Isolated (5/3/2022)    Social Connection and Isolation Panel [NHANES]     Frequency of Communication with Friends and Family: Three times a week     Frequency of Social Gatherings with Friends and Family: Twice a week     Attends Gnosticism Services: Never     Active Member of

## 2024-07-02 NOTE — H&P
[facility] Internal Medicine  Surendra Little MD; Param Andres MD, Juan Miguel Heller MD, Damaris Mixon MD, Guille Jacobson MD; Urvashi Meredith MD    [facility] Internal Medicine   IN-PATIENT SERVICE   [facility]    HISTORY AND PHYSICAL EXAMINATION            Date:   7/2/2024  Patient name:  Rajani Iglesias  Date of admission:  7/2/2024 12:55 AM  MRN:   [id number]  Account:  [id number]  YOB: 1960  PCP:    Giovanna Brasher MD  Room:   [facility]  Code Status:    Full Code    Chief Complaint:     Chief Complaint   Patient presents with    Abdominal Pain     X 3-4 days    Bloated       History Obtained From:     Patient/EMR/Bedside RN    History of Present Illness:     Rajani Iglesias is a 63 y.o. Non- / non  female who presents with Abdominal Pain (X 3-4 days) and Bloated   and is admitted to the hospital for the management of Large bowel obstruction (HCC).  Rajani Iglesias is a 63 y.o. Non- / non  female who presents with Abdominal Pain (X 3-4 days) and Bloated and is admitted to the hospital for the management of Large bowel obstruction (HCC). Dr. Bustillo with general surgery was consulted. Orders placed for soap suds enema followed by barium enema. NPO. Patient also has elevated troponin 88, 79. Cardiology consulted.      CT ABDOMEN PELVIS W IV CONTRAST Additional Contrast? None     Result Date: 7/2/2024  1. Large bowel obstruction with transition point seen in the redundant transverse colon seen in the left lower quadrant.  Underlying stricture malignancy difficult to exclude. 2. Distended gallbladder with a small amount of perihepatic ascites. Recommend clinical correlation for possibility of cholecystitis      Past Medical History:     Past Medical History:   Diagnosis Date    Anxiety     Arthritis     At maximum risk for fall 12/29/2021    caudal equina syndrome and cervical stenosis    Breast cancer (HCC)      (H) 1.000 - 1.030    Urine Hgb TRACE (A) NEGATIVE    pH, Urine 5.5 5.0 - 8.0    Protein, UA 1+ (A) NEGATIVE mg/dL    Urobilinogen, Urine Normal 0.0 - 1.0 EU/dL    Nitrite, Urine NEGATIVE NEGATIVE    Leukocyte Esterase, Urine TRACE (A) NEGATIVE   Microscopic Urinalysis    Collection Time: 07/02/24  2:31 PM   Result Value Ref Range    WBC, UA 10 TO 20 (A) 0 TO 5 /HPF    RBC, UA 3 to 5 (A) 0 TO 2 /HPF    Casts UA 3 to 5 (A) None /LPF    Epithelial Cells, UA 6 TO 9 /HPF    Bacteria, UA MANY (A) None       Imaging/Diagnostics:  FL BARIUM ENEMA    Result Date: 7/2/2024  Findings of colonic obstruction are again demonstrated.  Unsuccessful passage of contrast beyond the site of obstruction in the redundant transverse colon in the left lower quadrant.     CT ABDOMEN PELVIS W IV CONTRAST Additional Contrast? None    Result Date: 7/2/2024  1. Large bowel obstruction with transition point seen in the redundant transverse colon seen in the left lower quadrant.  Underlying stricture malignancy difficult to exclude. 2. Distended gallbladder with a small amount of perihepatic ascites. Recommend clinical correlation for possibility of cholecystitis.       Assessment :      Hospital Problems             Last Modified POA    * (Principal) Large bowel obstruction (HCC) 7/2/2024 Yes    Diastolic heart failure (HCC) 7/2/2024 Yes    Hypothyroidism 7/2/2024 Yes       Plan:     Patient status inpatient in the Progressive Unit/Step down    1.  63-year-old female admitted with bowel obstruction, general surgery saw the patient emergently took her for the surgery,  Hypertension will continue to monitor, with IV medications as patient is n.p.o. at this time,  Hypothyroidism, will possibly start IV levothyroxine if patient does not start p.o. tomorrow,  DVT prophylaxis as per general surgery,  Full CODE STATUS  2. Disposition 3d      Consultations:   IP CONSULT TO GENERAL SURGERY  IP CONSULT TO INTERNAL MEDICINE  IP CONSULT TO CARDIOLOGY

## 2024-07-02 NOTE — CONSULTS
General Surgery  Consult        PATIENT NAME: Rajani Iglesias   YOB: 1960    ADMISSION DATE: 7/2/2024 12:55 AM     Consulting Physician: Dr Bustillo     TODAY'S DATE: 7/2/2024    Reason for consult: Colonic obstruction    Chief complaint: No flatus and abdominal distention    HISTORY OF PRESENT ILLNESS:  The patient is a 63 y.o. female  who presents with long history of chronic back pain with use of narcotics due to same.  Patient has never had any abdominal surgeries and came to the emergency department where she was noted to have no bowel movement over the last several days.  Patient has had accompanying abdominal discomfort and denies any urinary type symptoms.  Patient has had loss of appetite but no nausea or vomiting.  Patient has chronic difficulty with ambulation due to cauda equina syndrome.  Patient had CAT scan of abdomen and pelvis demonstrating colonic distention with transition point and likely fecal obstruction in left colon with proximal dilation.    Past Medical History:        Diagnosis Date    Anxiety     Arthritis     At maximum risk for fall 12/29/2021    caudal equina syndrome and cervical stenosis    Breast cancer ([facility])     Brief psychotic disorder ([facility]) 01/05/2024    Cancer ([facility])     right breast    Cauda equina syndrome ([facility]) 12/29/2021    neuropathy, mobility difficulty, incontinance    Cerebrovascular accident ([facility]) 01/01/2024    Cervical stenosis of spine 12/29/2021    GERD (gastroesophageal reflux disease)     History of stomach ulcers     History of therapeutic radiation     Hx antineoplastic chemo     Hypertension     Dr. MARY LOU Brasher    Hypothyroidism     Lumbar neuritis     NSTEMI (non-ST elevated myocardial infarction) ([facility]) 12/13/2023    Post laminectomy syndrome     Spinal deformity 11/2021    cervical and lumbar    Thyroid disease     Uses wheelchair     Wears dentures     Wellness examination     Dr. MARY LOU Brasher       Past Surgical History:        Procedure Laterality Date     Value Date/Time    WBC 9.5 07/02/2024 04:02 AM    RBC 3.44 07/02/2024 04:02 AM    HGB 12.1 07/02/2024 04:02 AM    HCT 34.3 07/02/2024 04:02 AM    MCV 99.7 07/02/2024 04:02 AM    MCH 35.1 07/02/2024 04:02 AM    MCHC 35.3 07/02/2024 04:02 AM    RDW 14.6 07/02/2024 04:02 AM     07/02/2024 04:02 AM    MPV 7.4 07/02/2024 04:02 AM     CMP:    Lab Results   Component Value Date/Time     07/02/2024 04:02 AM    K 3.4 07/02/2024 04:02 AM    CL 85 07/02/2024 04:02 AM    CO2 26 07/02/2024 04:02 AM    BUN 17 07/02/2024 04:02 AM    CREATININE 0.7 07/02/2024 04:02 AM    GFRAA >60 09/18/2022 12:15 PM    LABGLOM >90 07/02/2024 04:02 AM    LABGLOM >60 12/27/2023 07:29 AM    GLUCOSE 102 07/02/2024 04:02 AM    CALCIUM 7.5 07/02/2024 04:02 AM    BILITOT 1.3 07/02/2024 04:02 AM    ALKPHOS 104 07/02/2024 04:02 AM    AST 32 07/02/2024 04:02 AM    ALT 35 07/02/2024 04:02 AM       Pertinent Radiology:    CT ABDOMEN PELVIS W IV CONTRAST Additional Contrast? None   Final Result   1. Large bowel obstruction with transition point seen in the redundant   transverse colon seen in the left lower quadrant.  Underlying stricture   malignancy difficult to exclude.   2. Distended gallbladder with a small amount of perihepatic ascites.   Recommend clinical correlation for possibility of cholecystitis.         FL BARIUM ENEMA    (Results Pending)         ASSESSMENT   Proximal colon distention with likely hard stool ball causing distal obstruction with mild hypokalemia      PLAN    Will give soapsuds enema in the emergency department and plan  Gastroview enema in radiology to exclude fixed obstruction  IV fluids and correction of hypokalemia and monitor closely  Thank you for consultation        Electronically signed by Saad Bustillo DO  on 7/2/2024 at 9:09 AM

## 2024-07-03 LAB
ABSOLUTE BANDS: 0.52 K/UL (ref 0–1)
ALBUMIN SERPL-MCNC: 3 G/DL (ref 3.5–5.2)
ALP SERPL-CCNC: 75 U/L (ref 35–104)
ALT SERPL-CCNC: 24 U/L (ref 5–33)
ANION GAP SERPL CALCULATED.3IONS-SCNC: 12 MMOL/L (ref 9–17)
AST SERPL-CCNC: 24 U/L
BANDS: 5 % (ref 0–10)
BASOPHILS # BLD: 0 K/UL (ref 0–0.2)
BASOPHILS NFR BLD: 0 % (ref 0–2)
BILIRUB SERPL-MCNC: 0.6 MG/DL (ref 0.3–1.2)
BUN SERPL-MCNC: 10 MG/DL (ref 8–23)
CALCIUM SERPL-MCNC: 6.2 MG/DL (ref 8.6–10.4)
CHLORIDE SERPL-SCNC: 100 MMOL/L (ref 98–107)
CO2 SERPL-SCNC: 21 MMOL/L (ref 20–31)
CREAT SERPL-MCNC: 0.6 MG/DL (ref 0.5–0.9)
EOSINOPHIL # BLD: 0 K/UL (ref 0–0.4)
EOSINOPHILS RELATIVE PERCENT: 0 % (ref 0–4)
ERYTHROCYTE [DISTWIDTH] IN BLOOD BY AUTOMATED COUNT: 14.8 % (ref 11.5–14.9)
GFR, ESTIMATED: >90 ML/MIN/1.73M2
GLUCOSE SERPL-MCNC: 139 MG/DL (ref 70–99)
HCT VFR BLD AUTO: 29.7 % (ref 36–46)
HGB BLD-MCNC: 10.3 G/DL (ref 12–16)
LYMPHOCYTES NFR BLD: 0.31 K/UL (ref 1–4.8)
LYMPHOCYTES RELATIVE PERCENT: 3 % (ref 24–44)
MCH RBC QN AUTO: 34.8 PG (ref 26–34)
MCHC RBC AUTO-ENTMCNC: 34.7 G/DL (ref 31–37)
MCV RBC AUTO: 100.4 FL (ref 80–100)
MONOCYTES NFR BLD: 0.31 K/UL (ref 0.1–1.3)
MONOCYTES NFR BLD: 3 % (ref 1–7)
MORPHOLOGY: NORMAL
NEUTROPHILS NFR BLD: 89 % (ref 36–66)
NEUTS SEG NFR BLD: 9.26 K/UL (ref 1.3–9.1)
PLATELET # BLD AUTO: 263 K/UL (ref 150–450)
PMV BLD AUTO: 7.5 FL (ref 6–12)
POTASSIUM SERPL-SCNC: 4.3 MMOL/L (ref 3.7–5.3)
PROT SERPL-MCNC: 6.3 G/DL (ref 6.4–8.3)
RBC # BLD AUTO: 2.95 M/UL (ref 4–5.2)
SODIUM SERPL-SCNC: 133 MMOL/L (ref 135–144)
WBC OTHER # BLD: 10.4 K/UL (ref 3.5–11)

## 2024-07-03 PROCEDURE — 99232 SBSQ HOSP IP/OBS MODERATE 35: CPT | Performed by: INTERNAL MEDICINE

## 2024-07-03 PROCEDURE — 80053 COMPREHEN METABOLIC PANEL: CPT

## 2024-07-03 PROCEDURE — 51798 US URINE CAPACITY MEASURE: CPT

## 2024-07-03 PROCEDURE — 2500000003 HC RX 250 WO HCPCS: Performed by: NURSE PRACTITIONER

## 2024-07-03 PROCEDURE — 2580000003 HC RX 258: Performed by: SURGERY

## 2024-07-03 PROCEDURE — 99233 SBSQ HOSP IP/OBS HIGH 50: CPT | Performed by: NURSE PRACTITIONER

## 2024-07-03 PROCEDURE — 2060000000 HC ICU INTERMEDIATE R&B

## 2024-07-03 PROCEDURE — 2580000003 HC RX 258

## 2024-07-03 PROCEDURE — 36415 COLL VENOUS BLD VENIPUNCTURE: CPT

## 2024-07-03 PROCEDURE — 85025 COMPLETE CBC W/AUTO DIFF WBC: CPT

## 2024-07-03 PROCEDURE — 6360000002 HC RX W HCPCS

## 2024-07-03 PROCEDURE — 6370000000 HC RX 637 (ALT 250 FOR IP)

## 2024-07-03 RX ORDER — METOPROLOL TARTRATE 1 MG/ML
5 INJECTION, SOLUTION INTRAVENOUS EVERY 6 HOURS PRN
Status: DISCONTINUED | OUTPATIENT
Start: 2024-07-03 | End: 2024-07-06 | Stop reason: HOSPADM

## 2024-07-03 RX ADMIN — MORPHINE SULFATE 4 MG: 4 INJECTION, SOLUTION INTRAMUSCULAR; INTRAVENOUS at 04:21

## 2024-07-03 RX ADMIN — SODIUM CHLORIDE, POTASSIUM CHLORIDE, SODIUM LACTATE AND CALCIUM CHLORIDE: 600; 310; 30; 20 INJECTION, SOLUTION INTRAVENOUS at 00:19

## 2024-07-03 RX ADMIN — MORPHINE SULFATE 4 MG: 4 INJECTION, SOLUTION INTRAMUSCULAR; INTRAVENOUS at 20:11

## 2024-07-03 RX ADMIN — MORPHINE SULFATE 4 MG: 4 INJECTION, SOLUTION INTRAMUSCULAR; INTRAVENOUS at 15:12

## 2024-07-03 RX ADMIN — OXYCODONE HYDROCHLORIDE 5 MG: 5 TABLET ORAL at 11:22

## 2024-07-03 RX ADMIN — ONDANSETRON 4 MG: 2 INJECTION INTRAMUSCULAR; INTRAVENOUS at 19:02

## 2024-07-03 RX ADMIN — MORPHINE SULFATE 4 MG: 4 INJECTION, SOLUTION INTRAMUSCULAR; INTRAVENOUS at 09:34

## 2024-07-03 RX ADMIN — MORPHINE SULFATE 4 MG: 4 INJECTION, SOLUTION INTRAMUSCULAR; INTRAVENOUS at 00:11

## 2024-07-03 RX ADMIN — SODIUM CHLORIDE, POTASSIUM CHLORIDE, SODIUM LACTATE AND CALCIUM CHLORIDE: 600; 310; 30; 20 INJECTION, SOLUTION INTRAVENOUS at 11:18

## 2024-07-03 RX ADMIN — OXYCODONE HYDROCHLORIDE 5 MG: 5 TABLET ORAL at 19:02

## 2024-07-03 RX ADMIN — SODIUM CHLORIDE, PRESERVATIVE FREE 10 ML: 5 INJECTION INTRAVENOUS at 20:04

## 2024-07-03 RX ADMIN — METOPROLOL TARTRATE 5 MG: 1 INJECTION, SOLUTION INTRAVENOUS at 20:03

## 2024-07-03 RX ADMIN — SODIUM CHLORIDE, POTASSIUM CHLORIDE, SODIUM LACTATE AND CALCIUM CHLORIDE: 600; 310; 30; 20 INJECTION, SOLUTION INTRAVENOUS at 23:10

## 2024-07-03 RX ADMIN — ONDANSETRON 4 MG: 2 INJECTION INTRAMUSCULAR; INTRAVENOUS at 09:34

## 2024-07-03 ASSESSMENT — PAIN DESCRIPTION - LOCATION
LOCATION: ABDOMEN

## 2024-07-03 ASSESSMENT — PAIN SCALES - GENERAL
PAINLEVEL_OUTOF10: 5
PAINLEVEL_OUTOF10: 8
PAINLEVEL_OUTOF10: 8
PAINLEVEL_OUTOF10: 9
PAINLEVEL_OUTOF10: 6
PAINLEVEL_OUTOF10: 8
PAINLEVEL_OUTOF10: 8

## 2024-07-03 ASSESSMENT — PAIN DESCRIPTION - FREQUENCY: FREQUENCY: CONTINUOUS

## 2024-07-03 ASSESSMENT — PAIN - FUNCTIONAL ASSESSMENT
PAIN_FUNCTIONAL_ASSESSMENT: PREVENTS OR INTERFERES SOME ACTIVE ACTIVITIES AND ADLS

## 2024-07-03 ASSESSMENT — PAIN DESCRIPTION - ONSET: ONSET: ON-GOING

## 2024-07-03 ASSESSMENT — PAIN DESCRIPTION - PAIN TYPE
TYPE: SURGICAL PAIN
TYPE: SURGICAL PAIN

## 2024-07-03 ASSESSMENT — PAIN DESCRIPTION - DESCRIPTORS
DESCRIPTORS: ACHING;DISCOMFORT;SHARP
DESCRIPTORS: ACHING;DISCOMFORT;SORE;TIGHTNESS
DESCRIPTORS: ACHING;DISCOMFORT;SORE

## 2024-07-03 ASSESSMENT — PAIN DESCRIPTION - ORIENTATION
ORIENTATION: MID

## 2024-07-03 ASSESSMENT — PAIN DESCRIPTION - DIRECTION: RADIATING_TOWARDS: DENIES

## 2024-07-03 NOTE — PROGRESS NOTES
General Surgery Progress Note    Subjective:     Patient without complaints. No nausea or vomiting. Voiding well.     Scheduled Meds:   sodium chloride flush  5-40 mL IntraVENous 2 times per day    scopolamine  1 patch TransDERmal Q72H     Continuous Infusions:   sodium chloride      lactated ringers IV soln 100 mL/hr at 07/03/24 0019     PRN Meds:morphine, sodium chloride flush, sodium chloride, potassium chloride **OR** potassium alternative oral replacement **OR** potassium chloride, magnesium sulfate, ondansetron **OR** ondansetron, melatonin, acetaminophen **OR** [DISCONTINUED] acetaminophen, metoprolol, hydrALAZINE, oxyCODONE    Objective:   BP (!) 143/97   Pulse (!) 101   Temp 97.6 °F (36.4 °C) (Oral)   Resp 14   Ht 1.549 m (5' 0.98\")   Wt 49.9 kg (110 lb)   SpO2 97%   BMI 20.80 kg/m²     I/O last 3 completed shifts:  In: 1100 [I.V.:1000; IV Piggyback:100]  Out: 350 [Urine:350]    Chest: Breath sounds were clear and equal with no rales, wheezes, or rhonchi.  Respiratory effort was normal with no retractions or use of accessory muscles.    Cardiovascular: Heart sounds were normal with a regular rate and rhythm.  There were no murmurs or gallops.    Abdomen:  Bowel sounds were present.  Stoma is pink without output.  Dressing is clean dry and intact    LABS:  Lab Results   Component Value Date/Time    WBC 9.5 07/02/2024 04:02 AM    RBC 3.44 07/02/2024 04:02 AM    HGB 12.1 07/02/2024 04:02 AM    HCT 34.3 07/02/2024 04:02 AM    MCV 99.7 07/02/2024 04:02 AM    MCH 35.1 07/02/2024 04:02 AM    MCHC 35.3 07/02/2024 04:02 AM    RDW 14.6 07/02/2024 04:02 AM     07/02/2024 04:02 AM    MPV 7.4 07/02/2024 04:02 AM       Lab Results   Component Value Date/Time     07/02/2024 04:02 AM    K 3.4 07/02/2024 04:02 AM    CL 85 07/02/2024 04:02 AM    CO2 26 07/02/2024 04:02 AM    BUN 17 07/02/2024 04:02 AM    CREATININE 0.7 07/02/2024 04:02 AM    GLUCOSE 102 07/02/2024 04:02 AM    CALCIUM 7.5 07/02/2024 04:02

## 2024-07-03 NOTE — PROGRESS NOTES
nonspecific ST and T waves changes, unchanged from previous tracings.  ECHO: previously taken 12/2023 and show below.   Ejection fraction: 50%  Stress Test: not obtained.  Cardiac Angiography: not obtained.     Echo 12/2023  Left Ventricle Normal left ventricular systolic function. EF by 2D Simpsons Biplane is 50%. Left ventricle is smaller than normal. Mildly increased wall thickness. Normal wall motion. Normal diastolic function.   Left Atrium Left atrium size is normal.   Interatrial Septum No interatrial shunt visualized with color Doppler.   Right Ventricle Right ventricle size is normal. Normal systolic function.   Right Atrium Right atrium size is normal.   Aortic Valve Valve structure is normal. No regurgitation. No stenosis.   Mitral Valve Valve structure is normal. Mild to moderate regurgitation. No stenosis noted.   Tricuspid Valve Valve structure is normal. Mild regurgitation. The estimated RVSP is 32 mmHg.   Pulmonic Valve The pulmonic valve was not well visualized.   Aorta Normal sized aortic root.   IVC/Hepatic Veins IVC was not assessed.   Pericardium No pericardial effusion.        Assessment / Acute Cardiac Problems:   Trop elevation, down-mcginnis trend with no acute ECG changes.   Large bowel obstruction  Preserved LVEF on echo 12/2023 without WMA or diastolic dysfunction  Hypokalemia  Hypomagnesemia  Chronic pain syndrome on daily Methadone  HTN    Patient Active Problem List:     Stenosis of cervical spine with myelopathy (HCC)     Uncontrolled hypertension     Hypothyroidism     Lumbar radiculopathy, chronic     Lumbar neuritis     Post laminectomy syndrome     Hypokalemia     Pain of right hip joint     Post-menopausal osteoporosis     Chronic gastritis without bleeding     Elevated CEA     Esophageal dysphagia     Hypotension due to drugs     Ulcerative esophagitis     Weight loss, abnormal     Cervical myelopathy (HCC)     Lumbar stenosis with neurogenic claudication     Spinal deformity

## 2024-07-03 NOTE — PLAN OF CARE
Problem: Safety - Adult  Goal: Free from fall injury  7/3/2024 0347 by Bernarda Garcia, RN  Outcome: Progressing  Note: Pt assessed as a fall risk this shift. Remains free from falls and accidental injury at this time. Fall precautions in place, including falling star sign and fall risk band on pt. Floor free from obstacles, and bed is locked and in lowest position. Adequate lighting provided.  Pt encouraged to call before getting OOB for any need.  Bed alarm activated. Will continue to monitor needs during hourly rounding, and reinforce education on use of call light.      Problem: Chronic Conditions and Co-morbidities  Goal: Patient's chronic conditions and co-morbidity symptoms are monitored and maintained or improved  7/3/2024 0347 by Bernarda Garcia, RN  Outcome: Progressing     Problem: Discharge Planning  Goal: Discharge to home or other facility with appropriate resources  7/3/2024 0347 by Bernarda Garcia, RN  Outcome: Progressing

## 2024-07-03 NOTE — CARE COORDINATION
ONGOING DISCHARGE PLAN:    Patient is alert and oriented x4. Currently sitting up in the chair.    Spoke with patient & Pt/ , regarding discharge plan and they confirm that plan is still to return to home.     Agreeable to [facility]. [facility], has accepted.     Pt. Is POD #1, Rt Colectomy w/ Ileostomy. Wound Care Nurse, has been consulted.     Pt. States \"Her 's Niece, will be willing to learn/assist how to take care of Ileostomy & she is currently in school for something in the Medical field\".     Cl liquid diet.     PT on board.     Will continue to follow for additional discharge needs.    If patient is discharged prior to next notation, then this note serves as note for discharge by case management.    Electronically signed by Summer Uriostegui RN on 7/3/2024 at 11:09 AM

## 2024-07-03 NOTE — PROGRESS NOTES
[facility] Internal Medicine  Surendra Little MD; Param Andres MD, Juan Miguel Heller MD, Damaris Mixon MD, Guille Jacobson MD; Urvashi Meredith MD    [facility] Internal Medicine   IN-PATIENT SERVICE   [facility]    Progress note            Date:   7/3/2024  Patient name:  Rajani Iglesias  Date of admission:  7/2/2024 12:55 AM  MRN:   [id number]  Account:  [id number]  YOB: 1960  PCP:    Giovanna Brasher MD  Room:   [address] [place] [address]  Code Status:    Full Code    Chief Complaint:     Chief Complaint   Patient presents with    Abdominal Pain     X 3-4 days    Bloated       History Obtained From:     Patient/EMR/Bedside RN    History of Present Illness:     Rajani Iglesias is a 63 y.o. Non- / non  female who presents with Abdominal Pain (X 3-4 days) and Bloated   and is admitted to the hospital for the management of Large bowel obstruction (HCC).  Rajani Iglesias is a 63 y.o. Non- / non  female who presents with Abdominal Pain (X 3-4 days) and Bloated and is admitted to the hospital for the management of Large bowel obstruction (HCC). Dr. Bustillo with general surgery was consulted. Orders placed for soap suds enema followed by barium enema. NPO. Patient also has elevated troponin 88, 79. Cardiology consulted.      CT ABDOMEN PELVIS W IV CONTRAST Additional Contrast? None     Result Date: 7/2/2024  1. Large bowel obstruction with transition point seen in the redundant transverse colon seen in the left lower quadrant.  Underlying stricture malignancy difficult to exclude. 2. Distended gallbladder with a small amount of perihepatic ascites. Recommend clinical correlation for possibility of cholecystitis      Past Medical History:     Past Medical History:   Diagnosis Date    Anxiety     Arthritis     At maximum risk for fall 12/29/2021    caudal equina syndrome and cervical stenosis    Breast cancer (HCC)     Brief psychotic  Absolute 0.31 (L) 1.0 - 4.8 k/uL    Monocytes Absolute 0.31 0.1 - 1.3 k/uL    Eosinophils Absolute 0.00 0.0 - 0.4 k/uL    Basophils Absolute 0.00 0.0 - 0.2 k/uL    Absolute Bands 0.52 0.0 - 1.0 k/uL    Morphology Normal    Comprehensive Metabolic Panel    Collection Time: 07/03/24  4:55 AM   Result Value Ref Range    Sodium 133 (L) 135 - 144 mmol/L    Potassium 4.3 3.7 - 5.3 mmol/L    Chloride 100 98 - 107 mmol/L    CO2 21 20 - 31 mmol/L    Anion Gap 12 9 - 17 mmol/L    Glucose 139 (H) 70 - 99 mg/dL    BUN 10 8 - 23 mg/dL    Creatinine 0.6 0.5 - 0.9 mg/dL    Est, Glom Filt Rate >90 >60 mL/min/1.73m2    Calcium 6.2 (L) 8.6 - 10.4 mg/dL    Total Protein 6.3 (L) 6.4 - 8.3 g/dL    Albumin 3.0 (L) 3.5 - 5.2 g/dL    Total Bilirubin 0.6 0.3 - 1.2 mg/dL    Alkaline Phosphatase 75 35 - 104 U/L    ALT 24 5 - 33 U/L    AST 24 <32 U/L       Imaging/Diagnostics:  FL BARIUM ENEMA    Result Date: 7/2/2024  Findings of colonic obstruction are again demonstrated.  Unsuccessful passage of contrast beyond the site of obstruction in the redundant transverse colon in the left lower quadrant.     CT ABDOMEN PELVIS W IV CONTRAST Additional Contrast? None    Result Date: 7/2/2024  1. Large bowel obstruction with transition point seen in the redundant transverse colon seen in the left lower quadrant.  Underlying stricture malignancy difficult to exclude. 2. Distended gallbladder with a small amount of perihepatic ascites. Recommend clinical correlation for possibility of cholecystitis.       Assessment :      Hospital Problems             Last Modified POA    * (Principal) Large bowel obstruction (HCC) 7/2/2024 Yes    Diastolic heart failure (HCC) 7/2/2024 Yes    Hypothyroidism 7/2/2024 Yes     Plan:     Patient status inpatient in the Progressive Unit/Step down    1.  63-year-old female admitted with bowel obstruction, general surgery saw the patient emergently took her for the surgery,  Hypertension will continue to monitor, with IV

## 2024-07-03 NOTE — CONSULTS
[facility] Nurse  New Ostomy Progress Note      NAME:  Rajani Iglesias  MEDICAL RECORD NUMBER:  [id number]  AGE: 63 y.o.   GENDER:  female  :  1960  TODAY'S DATE:  7/3/2024    Subjective      Patient up in chair.  Pain tolerable.  Was able to eat some of her lunch.    Type of ostomy: End ileostomy    Location of ostomy: Right lower quadrant    Date of surgery/surgeon: 2024 Dr. Bustillo    Ostomy history: Patient to ER on  with abdominal pain for a few days and no bowel movement in 1 week.  CT showed large bowel obstruction.  Enemas not effective and gastroview enema showed obstruction of the colon.  She was taken to surgery on  by Dr. Bustillo for right colectomy and partial omentectomy with ileostomy creation      Objective    BP (!) 133/91   Pulse (!) 110   Temp 98.3 °F (36.8 °C) (Oral)   Resp 17   Ht 1.549 m (5' 0.98\")   Wt 49.9 kg (110 lb)   SpO2 96%   BMI 20.80 kg/m²     Morteza Risk Score Morteza Scale Score: 19    Patient Active Problem List   Diagnosis Code    Stenosis of cervical spine with myelopathy (HCC) M48.02, G99.2    Uncontrolled hypertension I10    Hypothyroidism E03.9    Lumbar radiculopathy, chronic M54.16    Lumbar neuritis M54.16    Post laminectomy syndrome M96.1    Hypokalemia E87.6    Pain of right hip joint M25.551    Post-menopausal osteoporosis M81.0    Chronic gastritis without bleeding K29.50    Elevated CEA R97.0    Esophageal dysphagia R13.19    Hypotension due to drugs I95.2    Ulcerative esophagitis K22.10    Weight loss, abnormal R63.4    Cervical myelopathy (HCC) G95.9    Lumbar stenosis with neurogenic claudication M48.062    Spinal deformity Q76.49    Anxiety about health R45.89    Cauda equina compression (HCC) G83.4    Anemia, normocytic normochromic D64.9    Iron deficiency E61.1    Pseudomonas urinary tract infection N39.0, B96.5    Hypocalcemia E83.51    Cauda equina syndrome (HCC) G83.4    Hemiplegia (HCC) G81.90    Back pain M54.9     understanding able to:  [x] Indicates understanding       [] Needs reinforcement  [] Unsuccessful      [x] Verbal Understanding  [x] Demonstrated understanding       [] No evidence of learning  [] Refused teaching         [] N/      Electronically signed by Nancy Bañuelos RN on  7/3/2024 at 6:51 PM

## 2024-07-03 NOTE — DISCHARGE INSTR - COC
(Formerly [facility]) K56.609       Isolation/Infection:   Isolation            No Isolation          Patient Infection Status       None to display                     Nurse Assessment:  Last Vital Signs: BP (!) 144/90   Pulse (!) 103   Temp 98.9 °F (37.2 °C) (Oral)   Resp 16   Ht 1.549 m (5' 0.98\")   Wt 49.9 kg (110 lb)   SpO2 96%   BMI 20.80 kg/m²     Last documented pain score (0-10 scale): Pain Level: 6  Last Weight:   Wt Readings from Last 1 Encounters:   07/02/24 49.9 kg (110 lb)     Mental Status:  oriented and alert    IV Access:  - None    Nursing Mobility/ADLs:  Walking   Independent  Transfer  Independent  Bathing  Independent  Dressing  Independent  Toileting  Independent  Feeding  Independent  Med Admin  Independent  Med Delivery   whole    Wound Care Documentation and Therapy:  Incision 07/02/24 Abdomen Medial (Active)   Dressing Status Clean;Dry;Intact 07/03/24 0431   Incision Cleansed Not Cleansed 07/02/24 1942   Dressing/Treatment Dry dressing 07/03/24 0431   Closure Other (Comment) 07/03/24 0431   Margins Approximated 07/02/24 1252   Drainage Amount None (dry) 07/02/24 1530   Odor None 07/02/24 1530   Number of days: 1        Elimination:  Continence:   Bowel: colostomy in place  Bladder: Yes  Urinary Catheter: None   Colostomy/Ileostomy/Ileal Conduit: Yes  Colostomy RLQ-Stomal Appliance: 1 piece, Clean, dry & intact  Colostomy RLQ-Stoma  Assessment: Pink  Colostomy RLQ-Mucocutaneous Junction: Intact  Colostomy RLQ-Peristomal Assessment: Clean, dry & intact  Colostomy RLQ-Treatment: Other (Comment) (new pouch placed per surgeon)    Date of Last BM: 7/6/24      Ostomy supplies used:  #[id number]     SenSura Wei Flex Flat Skin Barrier 2-pc. (red)  #[id number]     [employer]ura [place] Flex EasiClose Wide Drainable Pouch 2-pc. with filter (red)  #[id number]     Brava Protective Seal 2.5mm thin  #[id number]     Brava Skin Barrier Wipe  (Patient also provided with a few samples of #[id number] flat 1 piece pouch and will send variety of samples

## 2024-07-04 LAB
ANION GAP SERPL CALCULATED.3IONS-SCNC: 13 MMOL/L (ref 9–17)
BASOPHILS # BLD: 0 K/UL (ref 0–0.2)
BASOPHILS NFR BLD: 0 % (ref 0–2)
BUN SERPL-MCNC: 10 MG/DL (ref 8–23)
CALCIUM SERPL-MCNC: 6.1 MG/DL (ref 8.6–10.4)
CHLORIDE SERPL-SCNC: 100 MMOL/L (ref 98–107)
CO2 SERPL-SCNC: 23 MMOL/L (ref 20–31)
CREAT SERPL-MCNC: 0.6 MG/DL (ref 0.5–0.9)
EOSINOPHIL # BLD: 0 K/UL (ref 0–0.4)
EOSINOPHILS RELATIVE PERCENT: 0 % (ref 0–4)
ERYTHROCYTE [DISTWIDTH] IN BLOOD BY AUTOMATED COUNT: 14.7 % (ref 11.5–14.9)
GFR, ESTIMATED: >90 ML/MIN/1.73M2
GLUCOSE SERPL-MCNC: 87 MG/DL (ref 70–99)
HCT VFR BLD AUTO: 29.5 % (ref 36–46)
HGB BLD-MCNC: 9.8 G/DL (ref 12–16)
LYMPHOCYTES NFR BLD: 0.9 K/UL (ref 1–4.8)
LYMPHOCYTES RELATIVE PERCENT: 11 % (ref 24–44)
MCH RBC QN AUTO: 34.5 PG (ref 26–34)
MCHC RBC AUTO-ENTMCNC: 33.1 G/DL (ref 31–37)
MCV RBC AUTO: 104.3 FL (ref 80–100)
MONOCYTES NFR BLD: 0.6 K/UL (ref 0.1–1.3)
MONOCYTES NFR BLD: 8 % (ref 1–7)
NEUTROPHILS NFR BLD: 81 % (ref 36–66)
NEUTS SEG NFR BLD: 6.6 K/UL (ref 1.3–9.1)
PLATELET # BLD AUTO: 245 K/UL (ref 150–450)
PMV BLD AUTO: 7.1 FL (ref 6–12)
POTASSIUM SERPL-SCNC: 3.7 MMOL/L (ref 3.7–5.3)
RBC # BLD AUTO: 2.83 M/UL (ref 4–5.2)
SODIUM SERPL-SCNC: 136 MMOL/L (ref 135–144)
WBC OTHER # BLD: 8.1 K/UL (ref 3.5–11)

## 2024-07-04 PROCEDURE — 6360000002 HC RX W HCPCS

## 2024-07-04 PROCEDURE — 2580000003 HC RX 258: Performed by: SURGERY

## 2024-07-04 PROCEDURE — 6370000000 HC RX 637 (ALT 250 FOR IP)

## 2024-07-04 PROCEDURE — 2580000003 HC RX 258

## 2024-07-04 PROCEDURE — 85025 COMPLETE CBC W/AUTO DIFF WBC: CPT

## 2024-07-04 PROCEDURE — 2060000000 HC ICU INTERMEDIATE R&B

## 2024-07-04 PROCEDURE — 80048 BASIC METABOLIC PNL TOTAL CA: CPT

## 2024-07-04 PROCEDURE — 36415 COLL VENOUS BLD VENIPUNCTURE: CPT

## 2024-07-04 PROCEDURE — 2500000003 HC RX 250 WO HCPCS: Performed by: NURSE PRACTITIONER

## 2024-07-04 PROCEDURE — 99232 SBSQ HOSP IP/OBS MODERATE 35: CPT | Performed by: INTERNAL MEDICINE

## 2024-07-04 RX ORDER — M-VIT,TX,IRON,MINS/CALC/FOLIC 27MG-0.4MG
1 TABLET ORAL DAILY
Status: DISCONTINUED | OUTPATIENT
Start: 2024-07-04 | End: 2024-07-06 | Stop reason: HOSPADM

## 2024-07-04 RX ORDER — ACETAMINOPHEN 325 MG/1
650 TABLET ORAL EVERY 4 HOURS PRN
Status: DISCONTINUED | OUTPATIENT
Start: 2024-07-04 | End: 2024-07-06 | Stop reason: HOSPADM

## 2024-07-04 RX ADMIN — OXYCODONE HYDROCHLORIDE 5 MG: 5 TABLET ORAL at 13:24

## 2024-07-04 RX ADMIN — MORPHINE SULFATE 4 MG: 4 INJECTION, SOLUTION INTRAMUSCULAR; INTRAVENOUS at 11:01

## 2024-07-04 RX ADMIN — METOPROLOL TARTRATE 5 MG: 1 INJECTION, SOLUTION INTRAVENOUS at 19:59

## 2024-07-04 RX ADMIN — SODIUM CHLORIDE, PRESERVATIVE FREE 10 ML: 5 INJECTION INTRAVENOUS at 08:51

## 2024-07-04 RX ADMIN — MORPHINE SULFATE 4 MG: 4 INJECTION, SOLUTION INTRAMUSCULAR; INTRAVENOUS at 15:25

## 2024-07-04 RX ADMIN — MORPHINE SULFATE 4 MG: 4 INJECTION, SOLUTION INTRAMUSCULAR; INTRAVENOUS at 05:41

## 2024-07-04 RX ADMIN — SODIUM CHLORIDE, POTASSIUM CHLORIDE, SODIUM LACTATE AND CALCIUM CHLORIDE: 600; 310; 30; 20 INJECTION, SOLUTION INTRAVENOUS at 11:04

## 2024-07-04 RX ADMIN — OXYCODONE HYDROCHLORIDE 5 MG: 5 TABLET ORAL at 20:41

## 2024-07-04 RX ADMIN — MORPHINE SULFATE 4 MG: 4 INJECTION, SOLUTION INTRAMUSCULAR; INTRAVENOUS at 19:24

## 2024-07-04 RX ADMIN — MORPHINE SULFATE 4 MG: 4 INJECTION, SOLUTION INTRAMUSCULAR; INTRAVENOUS at 23:25

## 2024-07-04 ASSESSMENT — PAIN DESCRIPTION - FREQUENCY
FREQUENCY: INTERMITTENT
FREQUENCY: INTERMITTENT
FREQUENCY: CONTINUOUS

## 2024-07-04 ASSESSMENT — PAIN DESCRIPTION - DESCRIPTORS
DESCRIPTORS: DISCOMFORT;TIGHTNESS;ACHING
DESCRIPTORS: DISCOMFORT;TIGHTNESS;TENDER
DESCRIPTORS: TIGHTNESS;THROBBING
DESCRIPTORS: TIGHTNESS;ACHING

## 2024-07-04 ASSESSMENT — PAIN DESCRIPTION - LOCATION
LOCATION: ABDOMEN
LOCATION: ABDOMEN;BACK

## 2024-07-04 ASSESSMENT — PAIN DESCRIPTION - ORIENTATION
ORIENTATION: RIGHT;LEFT
ORIENTATION: RIGHT;LEFT
ORIENTATION: MID

## 2024-07-04 ASSESSMENT — PAIN DESCRIPTION - DIRECTION
RADIATING_TOWARDS: DENIES

## 2024-07-04 ASSESSMENT — PAIN - FUNCTIONAL ASSESSMENT
PAIN_FUNCTIONAL_ASSESSMENT: PREVENTS OR INTERFERES SOME ACTIVE ACTIVITIES AND ADLS

## 2024-07-04 ASSESSMENT — PAIN SCALES - GENERAL
PAINLEVEL_OUTOF10: 8
PAINLEVEL_OUTOF10: 8
PAINLEVEL_OUTOF10: 5
PAINLEVEL_OUTOF10: 5
PAINLEVEL_OUTOF10: 7
PAINLEVEL_OUTOF10: 8
PAINLEVEL_OUTOF10: 6
PAINLEVEL_OUTOF10: 5
PAINLEVEL_OUTOF10: 6
PAINLEVEL_OUTOF10: 7
PAINLEVEL_OUTOF10: 8

## 2024-07-04 ASSESSMENT — PAIN DESCRIPTION - ONSET
ONSET: ON-GOING
ONSET: ON-GOING
ONSET: GRADUAL

## 2024-07-04 ASSESSMENT — PAIN DESCRIPTION - PAIN TYPE
TYPE: SURGICAL PAIN

## 2024-07-04 NOTE — PROGRESS NOTES
Updated Dr. Meredith related to mario placed and adult urinary retention protocol not followed. Orders received to remove and follow the urinary retention protocol.

## 2024-07-04 NOTE — PROGRESS NOTES
for obstructed bowel,  NG out,  Blood pressure running on the high side, continue monitor,  IV hydration,  Pain control,  Started on diet,  Tolerating,  Passing gas,  PT OT    Consultations:   IP CONSULT TO GENERAL SURGERY  IP CONSULT TO INTERNAL MEDICINE  IP CONSULT TO CARDIOLOGY     Patient is admitted as inpatient status because of co-morbidities listed above, severity of signs and symptoms as outlined, requirement for current medical therapies and most importantly because of direct risk to patient if care not provided in a hospital setting.  Expected length of stay > 48 hours.    Urvashi Meredith MD  7/4/2024  3:38 PM    Copy sent to Giovanna Nagel MD    Please note that this chart was generated using voice recognition Dragon dictation software.  Although every effort was made to ensure the accuracy of this automated transcription, some errors in transcription may have occurred.

## 2024-07-04 NOTE — PROGRESS NOTES
General Surgery Progress Note    Subjective:     Patient without complaints. No nausea or vomiting. Voiding well.  Having stoma output.  Wound is clean    Scheduled Meds:   multivitamin  1 tablet Oral Daily    sodium chloride flush  5-40 mL IntraVENous 2 times per day    scopolamine  1 patch TransDERmal Q72H     Continuous Infusions:   sodium chloride      lactated ringers IV soln 75 mL/hr at 07/03/24 2310     PRN Meds:metoprolol, morphine, sodium chloride flush, sodium chloride, potassium chloride **OR** potassium alternative oral replacement **OR** potassium chloride, magnesium sulfate, ondansetron **OR** ondansetron, melatonin, acetaminophen **OR** [DISCONTINUED] acetaminophen, hydrALAZINE, oxyCODONE    Objective:   BP (!) 150/98   Pulse (!) 101   Temp 98.5 °F (36.9 °C) (Oral)   Resp 16   Ht 1.549 m (5' 0.98\")   Wt 49.9 kg (110 lb)   SpO2 91%   BMI 20.80 kg/m²     I/O last 3 completed shifts:  In: 400 [P.O.:400]  Out: 1425 [Urine:1425]    Chest: Breath sounds were clear and equal with no rales, wheezes, or rhonchi.      Cardiovascular: Heart sounds were normal with a regular rate and rhythm.  There were no murmurs or gallops.    Abdomen:  Bowel sounds were normal.  The abdomen was soft and non distended.  Stoma with enteric output.  Wound is clean.  LABS:  Lab Results   Component Value Date/Time    WBC 8.1 07/04/2024 05:15 AM    RBC 2.83 07/04/2024 05:15 AM    HGB 9.8 07/04/2024 05:15 AM    HCT 29.5 07/04/2024 05:15 AM    .3 07/04/2024 05:15 AM    MCH 34.5 07/04/2024 05:15 AM    MCHC 33.1 07/04/2024 05:15 AM    RDW 14.7 07/04/2024 05:15 AM     07/04/2024 05:15 AM    MPV 7.1 07/04/2024 05:15 AM       Lab Results   Component Value Date/Time     07/04/2024 05:15 AM    K 3.7 07/04/2024 05:15 AM     07/04/2024 05:15 AM    CO2 23 07/04/2024 05:15 AM    BUN 10 07/04/2024 05:15 AM    CREATININE 0.6 07/04/2024 05:15 AM    GLUCOSE 87 07/04/2024 05:15 AM    CALCIUM 6.1 07/04/2024 05:15 AM

## 2024-07-04 NOTE — FLOWSHEET NOTE
07/03/24 2330 07/03/24 2350   Oxygen Therapy   SpO2 (!) (S)  86 % (S)  92 %   O2 Device (S)  None (Room air) (S)  Nasal cannula   O2 Flow Rate (L/min)  --  (S)  2 L/min     This RN notified by [facility] that pt's SPO2 was 86% on room air. This RN to bedside to assess the pt. Patient denies feeling short of breath. This RN placed pt on 2L O2 per nasal cannula. Pt's SPO2 came up to 92% on 2L O2 per nasal cannula.

## 2024-07-04 NOTE — PLAN OF CARE
Problem: Discharge Planning  Goal: Discharge to home or other facility with appropriate resources  7/4/2024 0112 by Maikel Greenberg, RN  Outcome: Progressing  Flowsheets (Taken 7/4/2024 0112)  Discharge to home or other facility with appropriate resources:   Identify barriers to discharge with patient and caregiver   Arrange for needed discharge resources and transportation as appropriate   Identify discharge learning needs (meds, wound care, etc)   Refer to discharge planning if patient needs post-hospital services based on physician order or complex needs related to functional status, cognitive ability or social support system     Problem: Safety - Adult  Goal: Free from fall injury  7/4/2024 0112 by Maikel Greenberg, RN  Outcome: Progressing     Problem: Chronic Conditions and Co-morbidities  Goal: Patient's chronic conditions and co-morbidity symptoms are monitored and maintained or improved  7/4/2024 0112 by Maikel Greenberg, RN  Outcome: Progressing  Flowsheets (Taken 7/4/2024 0112)  Care Plan - Patient's Chronic Conditions and Co-Morbidity Symptoms are Monitored and Maintained or Improved:   Monitor and assess patient's chronic conditions and comorbid symptoms for stability, deterioration, or improvement   Collaborate with multidisciplinary team to address chronic and comorbid conditions and prevent exacerbation or deterioration   Update acute care plan with appropriate goals if chronic or comorbid symptoms are exacerbated and prevent overall improvement and discharge     Problem: ABCDS Injury Assessment  Goal: Absence of physical injury  7/4/2024 0112 by Maikel Greenberg, RN  Outcome: Progressing  Flowsheets (Taken 7/4/2024 0112)  Absence of Physical Injury: Implement safety measures based on patient assessment

## 2024-07-04 NOTE — FLOWSHEET NOTE
07/04/24 0538   Oxygen Therapy   SpO2 (S)  93 %   O2 Device (S)  None (Room air)     Pt requested to have nasal cannula off. Nasal cannula removed. Patient able to maintain SPO2 greater than 90% without nasal cannula.

## 2024-07-05 PROBLEM — Z93.2 ILEOSTOMY IN PLACE (HCC): Status: ACTIVE | Noted: 2024-07-05

## 2024-07-05 LAB
25(OH)D3 SERPL-MCNC: 43.7 NG/ML (ref 30–100)
ANION GAP SERPL CALCULATED.3IONS-SCNC: 11 MMOL/L (ref 9–17)
BASOPHILS # BLD: 0 K/UL (ref 0–0.2)
BASOPHILS NFR BLD: 0 % (ref 0–2)
BUN SERPL-MCNC: 10 MG/DL (ref 8–23)
CALCIUM SERPL-MCNC: 6.9 MG/DL (ref 8.6–10.4)
CHLORIDE SERPL-SCNC: 99 MMOL/L (ref 98–107)
CO2 SERPL-SCNC: 25 MMOL/L (ref 20–31)
CREAT SERPL-MCNC: 0.6 MG/DL (ref 0.5–0.9)
EOSINOPHIL # BLD: 0 K/UL (ref 0–0.4)
EOSINOPHILS RELATIVE PERCENT: 1 % (ref 0–4)
ERYTHROCYTE [DISTWIDTH] IN BLOOD BY AUTOMATED COUNT: 14.5 % (ref 11.5–14.9)
GFR, ESTIMATED: >90 ML/MIN/1.73M2
GLUCOSE SERPL-MCNC: 118 MG/DL (ref 70–99)
HCT VFR BLD AUTO: 31.5 % (ref 36–46)
HGB BLD-MCNC: 10.4 G/DL (ref 12–16)
LYMPHOCYTES NFR BLD: 0.6 K/UL (ref 1–4.8)
LYMPHOCYTES RELATIVE PERCENT: 10 % (ref 24–44)
MAGNESIUM SERPL-MCNC: 1.2 MG/DL (ref 1.6–2.6)
MCH RBC QN AUTO: 33.5 PG (ref 26–34)
MCHC RBC AUTO-ENTMCNC: 33 G/DL (ref 31–37)
MCV RBC AUTO: 101.4 FL (ref 80–100)
MONOCYTES NFR BLD: 0.6 K/UL (ref 0.1–1.3)
MONOCYTES NFR BLD: 11 % (ref 1–7)
NEUTROPHILS NFR BLD: 78 % (ref 36–66)
NEUTS SEG NFR BLD: 4.4 K/UL (ref 1.3–9.1)
PHOSPHATE SERPL-MCNC: 1.9 MG/DL (ref 2.6–4.5)
PLATELET # BLD AUTO: 262 K/UL (ref 150–450)
PMV BLD AUTO: 7.9 FL (ref 6–12)
POTASSIUM SERPL-SCNC: 3.5 MMOL/L (ref 3.7–5.3)
RBC # BLD AUTO: 3.1 M/UL (ref 4–5.2)
SODIUM SERPL-SCNC: 135 MMOL/L (ref 135–144)
WBC OTHER # BLD: 5.7 K/UL (ref 3.5–11)

## 2024-07-05 PROCEDURE — 6370000000 HC RX 637 (ALT 250 FOR IP)

## 2024-07-05 PROCEDURE — 80048 BASIC METABOLIC PNL TOTAL CA: CPT

## 2024-07-05 PROCEDURE — 83735 ASSAY OF MAGNESIUM: CPT

## 2024-07-05 PROCEDURE — 82306 VITAMIN D 25 HYDROXY: CPT

## 2024-07-05 PROCEDURE — 84100 ASSAY OF PHOSPHORUS: CPT

## 2024-07-05 PROCEDURE — 2500000003 HC RX 250 WO HCPCS: Performed by: NURSE PRACTITIONER

## 2024-07-05 PROCEDURE — 97161 PT EVAL LOW COMPLEX 20 MIN: CPT

## 2024-07-05 PROCEDURE — 2580000003 HC RX 258

## 2024-07-05 PROCEDURE — 85025 COMPLETE CBC W/AUTO DIFF WBC: CPT

## 2024-07-05 PROCEDURE — 6360000002 HC RX W HCPCS

## 2024-07-05 PROCEDURE — 2060000000 HC ICU INTERMEDIATE R&B

## 2024-07-05 PROCEDURE — 6370000000 HC RX 637 (ALT 250 FOR IP): Performed by: INTERNAL MEDICINE

## 2024-07-05 PROCEDURE — 2500000003 HC RX 250 WO HCPCS

## 2024-07-05 PROCEDURE — 97530 THERAPEUTIC ACTIVITIES: CPT

## 2024-07-05 PROCEDURE — 99232 SBSQ HOSP IP/OBS MODERATE 35: CPT | Performed by: INTERNAL MEDICINE

## 2024-07-05 PROCEDURE — 2500000003 HC RX 250 WO HCPCS: Performed by: INTERNAL MEDICINE

## 2024-07-05 PROCEDURE — 6370000000 HC RX 637 (ALT 250 FOR IP): Performed by: SURGERY

## 2024-07-05 PROCEDURE — 36415 COLL VENOUS BLD VENIPUNCTURE: CPT

## 2024-07-05 PROCEDURE — 51798 US URINE CAPACITY MEASURE: CPT

## 2024-07-05 PROCEDURE — 99222 1ST HOSP IP/OBS MODERATE 55: CPT

## 2024-07-05 RX ORDER — CALCIUM CARBONATE 500 MG/1
500 TABLET, CHEWABLE ORAL 3 TIMES DAILY
Status: DISCONTINUED | OUTPATIENT
Start: 2024-07-05 | End: 2024-07-06 | Stop reason: HOSPADM

## 2024-07-05 RX ORDER — LEVOTHYROXINE SODIUM 0.1 MG/1
100 TABLET ORAL DAILY
Status: DISCONTINUED | OUTPATIENT
Start: 2024-07-05 | End: 2024-07-06 | Stop reason: HOSPADM

## 2024-07-05 RX ORDER — TAMSULOSIN HYDROCHLORIDE 0.4 MG/1
0.4 CAPSULE ORAL DAILY
Status: DISCONTINUED | OUTPATIENT
Start: 2024-07-05 | End: 2024-07-06 | Stop reason: HOSPADM

## 2024-07-05 RX ORDER — BUSPIRONE HYDROCHLORIDE 15 MG/1
30 TABLET ORAL 2 TIMES DAILY
Status: DISCONTINUED | OUTPATIENT
Start: 2024-07-05 | End: 2024-07-06 | Stop reason: HOSPADM

## 2024-07-05 RX ORDER — CALCIUM GLUCONATE 10 MG/ML
1000 INJECTION, SOLUTION INTRAVENOUS ONCE
Status: COMPLETED | OUTPATIENT
Start: 2024-07-05 | End: 2024-07-05

## 2024-07-05 RX ORDER — PANTOPRAZOLE SODIUM 40 MG/1
40 TABLET, DELAYED RELEASE ORAL DAILY
Status: DISCONTINUED | OUTPATIENT
Start: 2024-07-05 | End: 2024-07-06 | Stop reason: HOSPADM

## 2024-07-05 RX ORDER — LANOLIN ALCOHOL/MO/W.PET/CERES
400 CREAM (GRAM) TOPICAL 2 TIMES DAILY
Status: DISCONTINUED | OUTPATIENT
Start: 2024-07-05 | End: 2024-07-06 | Stop reason: HOSPADM

## 2024-07-05 RX ORDER — LISINOPRIL 5 MG/1
5 TABLET ORAL DAILY
Status: DISCONTINUED | OUTPATIENT
Start: 2024-07-05 | End: 2024-07-06 | Stop reason: HOSPADM

## 2024-07-05 RX ORDER — FOLIC ACID 1 MG/1
1 TABLET ORAL DAILY
Status: DISCONTINUED | OUTPATIENT
Start: 2024-07-05 | End: 2024-07-06 | Stop reason: HOSPADM

## 2024-07-05 RX ORDER — QUETIAPINE FUMARATE 50 MG/1
25 TABLET, FILM COATED ORAL NIGHTLY
Status: DISCONTINUED | OUTPATIENT
Start: 2024-07-05 | End: 2024-07-06 | Stop reason: HOSPADM

## 2024-07-05 RX ADMIN — MORPHINE SULFATE 4 MG: 4 INJECTION, SOLUTION INTRAMUSCULAR; INTRAVENOUS at 13:59

## 2024-07-05 RX ADMIN — MORPHINE SULFATE 4 MG: 4 INJECTION, SOLUTION INTRAMUSCULAR; INTRAVENOUS at 22:21

## 2024-07-05 RX ADMIN — MORPHINE SULFATE 4 MG: 4 INJECTION, SOLUTION INTRAMUSCULAR; INTRAVENOUS at 04:11

## 2024-07-05 RX ADMIN — TAMSULOSIN HYDROCHLORIDE 0.4 MG: 0.4 CAPSULE ORAL at 13:43

## 2024-07-05 RX ADMIN — CALCIUM GLUCONATE 1000 MG: 10 INJECTION, SOLUTION INTRAVENOUS at 11:51

## 2024-07-05 RX ADMIN — BUSPIRONE HYDROCHLORIDE 30 MG: 15 TABLET ORAL at 13:42

## 2024-07-05 RX ADMIN — POTASSIUM BICARBONATE 40 MEQ: 782 TABLET, EFFERVESCENT ORAL at 08:28

## 2024-07-05 RX ADMIN — MAGNESIUM SULFATE HEPTAHYDRATE 2000 MG: 40 INJECTION, SOLUTION INTRAVENOUS at 14:31

## 2024-07-05 RX ADMIN — METOPROLOL TARTRATE 5 MG: 1 INJECTION, SOLUTION INTRAVENOUS at 05:26

## 2024-07-05 RX ADMIN — MORPHINE SULFATE 4 MG: 4 INJECTION, SOLUTION INTRAMUSCULAR; INTRAVENOUS at 08:20

## 2024-07-05 RX ADMIN — ANTACID TABLETS 500 MG: 500 TABLET, CHEWABLE ORAL at 14:31

## 2024-07-05 RX ADMIN — FOLIC ACID 1 MG: 1 TABLET ORAL at 13:43

## 2024-07-05 RX ADMIN — ANTACID TABLETS 500 MG: 500 TABLET, CHEWABLE ORAL at 20:47

## 2024-07-05 RX ADMIN — ANTACID TABLETS 500 MG: 500 TABLET, CHEWABLE ORAL at 11:49

## 2024-07-05 RX ADMIN — MORPHINE SULFATE 4 MG: 4 INJECTION, SOLUTION INTRAMUSCULAR; INTRAVENOUS at 18:07

## 2024-07-05 RX ADMIN — BUSPIRONE HYDROCHLORIDE 30 MG: 15 TABLET ORAL at 20:48

## 2024-07-05 RX ADMIN — SODIUM CHLORIDE, PRESERVATIVE FREE 10 ML: 5 INJECTION INTRAVENOUS at 08:22

## 2024-07-05 RX ADMIN — LISINOPRIL 5 MG: 5 TABLET ORAL at 13:43

## 2024-07-05 RX ADMIN — MAGNESIUM SULFATE HEPTAHYDRATE 2000 MG: 40 INJECTION, SOLUTION INTRAVENOUS at 08:19

## 2024-07-05 RX ADMIN — Medication 400 MG: at 20:47

## 2024-07-05 RX ADMIN — PANTOPRAZOLE SODIUM 40 MG: 40 TABLET, DELAYED RELEASE ORAL at 13:42

## 2024-07-05 RX ADMIN — LEVOTHYROXINE SODIUM 100 MCG: 0.1 TABLET ORAL at 13:43

## 2024-07-05 ASSESSMENT — PAIN DESCRIPTION - DIRECTION: RADIATING_TOWARDS: DENIES

## 2024-07-05 ASSESSMENT — PAIN DESCRIPTION - LOCATION
LOCATION: ABDOMEN
LOCATION: ABDOMEN;BACK
LOCATION: BACK
LOCATION: ABDOMEN
LOCATION: ABDOMEN

## 2024-07-05 ASSESSMENT — PAIN SCALES - GENERAL
PAINLEVEL_OUTOF10: 8
PAINLEVEL_OUTOF10: 9
PAINLEVEL_OUTOF10: 7
PAINLEVEL_OUTOF10: 7
PAINLEVEL_OUTOF10: 6
PAINLEVEL_OUTOF10: 8

## 2024-07-05 ASSESSMENT — PAIN DESCRIPTION - ONSET: ONSET: SUDDEN

## 2024-07-05 ASSESSMENT — PAIN DESCRIPTION - DESCRIPTORS
DESCRIPTORS: DISCOMFORT;TIGHTNESS
DESCRIPTORS: ACHING;DISCOMFORT

## 2024-07-05 ASSESSMENT — PAIN DESCRIPTION - PAIN TYPE: TYPE: SURGICAL PAIN

## 2024-07-05 ASSESSMENT — PAIN DESCRIPTION - FREQUENCY: FREQUENCY: INTERMITTENT

## 2024-07-05 ASSESSMENT — PAIN DESCRIPTION - ORIENTATION
ORIENTATION: RIGHT;LEFT
ORIENTATION: ANTERIOR;LOWER

## 2024-07-05 ASSESSMENT — PAIN - FUNCTIONAL ASSESSMENT: PAIN_FUNCTIONAL_ASSESSMENT: PREVENTS OR INTERFERES SOME ACTIVE ACTIVITIES AND ADLS

## 2024-07-05 NOTE — PROGRESS NOTES
Physical Therapy  Facility/Department: [facility] PROGRESSIVE CARE  Physical Therapy Initial Assessment    Name: Rajani Iglesias  : 1960  MRN: [id number]  Date of Service: 2024    Discharge Recommendations:  Home with assist PRN   PT Equipment Recommendations  Equipment Needed: No      Patient Diagnosis(es): The primary encounter diagnosis was Large bowel obstruction (HCC). A diagnosis of Colon obstruction (HCC) was also pertinent to this visit.  Past Medical History:  has a past medical history of Anxiety, Arthritis, At maximum risk for fall, Breast cancer (HCC), Brief psychotic disorder (HCC), Cancer (HCC), Cauda equina syndrome (HCC), Cerebrovascular accident (HCC), Cervical stenosis of spine, GERD (gastroesophageal reflux disease), History of stomach ulcers, History of therapeutic radiation, Hx antineoplastic chemo, Hypertension, Hypothyroidism, Lumbar neuritis, NSTEMI (non-ST elevated myocardial infarction) (HCC), Post laminectomy syndrome, Spinal deformity, Thyroid disease, Uses wheelchair, Wears dentures, and Wellness examination.  Past Surgical History:  has a past surgical history that includes hernia repair (Bilateral); Breast surgery (Right); Mastectomy, radical; Hysterectomy, total abdominal; Lumbar spine surgery (N/A, 2021); back surgery; Colonoscopy (about 2018); other surgical history (2022); cervical fusion (N/A, 2022); cervical fusion (N/A, 2022); and Sigmoid Colectomy (N/A, 2024).    Assessment   Body Structures, Functions, Activity Limitations Requiring Skilled Therapeutic Intervention: Decreased functional mobility ;Decreased endurance  Assessment: Impaired mobility due to decreased tolerance to activity  Decision Making: Low Complexity  History: s/p Ileostomy  Exam: decreased endurance, mobility  Clinical Presentation: evolving  Requires PT Follow-Up: Yes  Activity Tolerance  Activity Tolerance: Patient tolerated evaluation without incident;Patient limited by  fatigue;Patient limited by endurance     Plan   Physical Therapy Plan  General Plan: 5-7 times per week  Current Treatment Recommendations: Functional mobility training, Transfer training, Gait training, Stair training, Therapeutic activities, Endurance training  Safety Devices  Type of Devices: Left in chair, Call light within reach, Chair alarm in place     Restrictions  Restrictions/Precautions  Restrictions/Precautions: General Precautions, Fall Risk  Required Braces or Orthoses?: No     Subjective   Pain: pt reports pain in the abdomen of 7/10  General  Family / Caregiver Present: Yes  Follows Commands: Within Functional Limits  Subjective  Subjective: pt pleasant and cooperative         Social/Functional History  Social/Functional History  Lives With: Significant other  Type of Home: House  Home Layout: Two level, Able to Live on Main level with bedroom/bathroom  Home Access: Stairs to enter without rails  Entrance Stairs - Number of Steps: 1  Bathroom Shower/Tub: Tub/Shower unit, Shower chair without back  Bathroom Toilet: Standard  Bathroom Equipment: Grab bars in shower, Toilet raiser  Bathroom Accessibility: Walker accessible  Home Equipment: Cane, Walker - Rolling, Wheelchair - Manual, Rollator, Lift chair  Has the patient had two or more falls in the past year or any fall with injury in the past year?: No  Receives Help From: Home health, Family  ADL Assistance: Independent  Homemaking Assistance: Independent  Ambulation Assistance: Independent  Transfer Assistance: Independent  Active : No  Patient's  Info: family drives  Mode of Transportation: Car, SUV  IADL Comments: sleeps in lift chair                 Objective   O2 Device: None (Room air)        AROM RLE (degrees)  RLE AROM: WNL  AROM LLE (degrees)  LLE AROM : WNL  Strength RLE  Strength RLE: WNL  Strength LLE  Strength LLE: WNL           Bed mobility  Bed Mobility Comments: pt in Recliner at beginning and end of

## 2024-07-05 NOTE — PLAN OF CARE
Problem: Discharge Planning  Goal: Discharge to home or other facility with appropriate resources  7/5/2024 0157 by Maikel Greenberg, RN  Outcome: Progressing  Flowsheets (Taken 7/5/2024 0157)  Discharge to home or other facility with appropriate resources:   Identify barriers to discharge with patient and caregiver   Arrange for needed discharge resources and transportation as appropriate   Identify discharge learning needs (meds, wound care, etc)   Refer to discharge planning if patient needs post-hospital services based on physician order or complex needs related to functional status, cognitive ability or social support system     Problem: Safety - Adult  Goal: Free from fall injury  7/5/2024 0157 by Maikel Greenberg, RN  Outcome: Progressing     Problem: Chronic Conditions and Co-morbidities  Goal: Patient's chronic conditions and co-morbidity symptoms are monitored and maintained or improved  7/5/2024 0157 by Maikel Greenberg, RN  Outcome: Progressing  Flowsheets (Taken 7/5/2024 0157)  Care Plan - Patient's Chronic Conditions and Co-Morbidity Symptoms are Monitored and Maintained or Improved:   Monitor and assess patient's chronic conditions and comorbid symptoms for stability, deterioration, or improvement   Collaborate with multidisciplinary team to address chronic and comorbid conditions and prevent exacerbation or deterioration   Update acute care plan with appropriate goals if chronic or comorbid symptoms are exacerbated and prevent overall improvement and discharge     Problem: ABCDS Injury Assessment  Goal: Absence of physical injury  7/5/2024 0157 by Maikel Greenberg, RN  Outcome: Progressing  Flowsheets (Taken 7/5/2024 0157)  Absence of Physical Injury: Implement safety measures based on patient assessment     Problem: Gastrointestinal - Adult  Goal: Minimal or absence of nausea and vomiting  Outcome: Progressing  Flowsheets (Taken 7/5/2024 0157)  Minimal or absence of nausea and vomiting:    Administer IV fluids as ordered to ensure adequate hydration   Provide nonpharmacologic comfort measures as appropriate   Administer ordered antiemetic medications as needed     Problem: Gastrointestinal - Adult  Goal: Maintains or returns to baseline bowel function  Outcome: Progressing  Flowsheets (Taken 7/5/2024 0157)  Maintains or returns to baseline bowel function:   Assess bowel function   Encourage mobilization and activity   Encourage oral fluids to ensure adequate hydration   Administer IV fluids as ordered to ensure adequate hydration     Problem: Gastrointestinal - Adult  Goal: Maintains adequate nutritional intake  Outcome: Progressing     Problem: Gastrointestinal - Adult  Goal: Establish and maintain optimal ostomy function  Outcome: Progressing  Flowsheets (Taken 7/5/2024 0157)  Establish and maintain optimal ostomy function:   Monitor output from ostomies   Administer IV fluids and TPN as ordered

## 2024-07-05 NOTE — DISCHARGE INSTRUCTIONS
Routine colostomy care:    -Cut barrier to fit stoma with no more than 1/8\" of exposed skin.  -Place an Protective Seal or Barrier Ring immediately around the stoma.  -Empty the pouch when 1/3 to 1/2 full.  -Change the pouching system twice weekly and prn.  -Our goal is to keep the skin around the stoma intact and to have a dependable pouching system without leaks.  -The skin around the stoma should be intact and appear just like the skin on the opposite side of the abdomen.   -The stoma is expected to shrink in size as swelling reduces- up to 8 weeks post op. Measure stoma each time pouch is replaced during this timeframe.    If you have questions or concerns about your ILEOSTOMY, you may call the [facility]: [phone]      No lifting greater than 8 pounds.  May shower.  When changing pouch wash skin well with soap and water and dry with hair dryer.  Pain medicine per routine

## 2024-07-05 NOTE — CARE COORDINATION
ONGOING DISCHARGE PLAN:    Patient is alert and oriented x4.    Spoke with patient regarding discharge plan and patient confirms that plan is still home with Nigel Ryan.    POD #3 exp lap, right colectomy w/ ileostomy    Regular diet    Wound/Ostomy RN    PT/OT    F/U appt with Lupe DELAROSA with wound care on 7/16/2024 @ 10:30 am    Will continue to follow for additional discharge needs.    If patient is discharged prior to next notation, then this note serves as note for discharge by case management.    Electronically signed by Kayla Ceballos RN on 7/5/2024 at 11:05 AM

## 2024-07-05 NOTE — PROGRESS NOTES
General Surgery Progress Note    Subjective:     Patient without complaints. No nausea or vomiting. Voiding well.  Tolerating diet with good stoma output    Scheduled Meds:   multivitamin  1 tablet Oral Daily    sodium chloride flush  5-40 mL IntraVENous 2 times per day    scopolamine  1 patch TransDERmal Q72H     Continuous Infusions:   sodium chloride      lactated ringers IV soln 50 mL/hr at 07/04/24 1104     PRN Meds:acetaminophen, metoprolol, morphine, sodium chloride flush, sodium chloride, potassium chloride **OR** potassium alternative oral replacement **OR** potassium chloride, magnesium sulfate, ondansetron **OR** ondansetron, melatonin, acetaminophen **OR** [DISCONTINUED] acetaminophen, hydrALAZINE, oxyCODONE    Objective:   BP (!) 150/90   Pulse 94   Temp 98.5 °F (36.9 °C) (Oral)   Resp 17   Ht 1.549 m (5' 0.98\")   Wt 56.3 kg (124 lb 1.9 oz) Comment: bed zeroed prior to weighing; RN notified  SpO2 96%   BMI 23.46 kg/m²     I/O last 3 completed shifts:  In: -   Out: 800 [Urine:775; Stool:25]    Chest: Breath sounds were clear and equal with no rales, wheezes, or rhonchi.  Respiratory effort was normal with no retractions or use of accessory muscles.    Cardiovascular: Heart sounds were normal with a regular rate and rhythm.  There were no murmurs or gallops.    Abdomen:  Bowel sounds were normal.  Stoma appliance with enteric output.  Wound is clean and abdomen is soft    LABS:  Lab Results   Component Value Date/Time    WBC 8.1 07/04/2024 05:15 AM    RBC 2.83 07/04/2024 05:15 AM    HGB 9.8 07/04/2024 05:15 AM    HCT 29.5 07/04/2024 05:15 AM    .3 07/04/2024 05:15 AM    MCH 34.5 07/04/2024 05:15 AM    MCHC 33.1 07/04/2024 05:15 AM    RDW 14.7 07/04/2024 05:15 AM     07/04/2024 05:15 AM    MPV 7.1 07/04/2024 05:15 AM       Lab Results   Component Value Date/Time     07/05/2024 04:57 AM    K 3.5 07/05/2024 04:57 AM    CL 99 07/05/2024 04:57 AM    CO2 25 07/05/2024 04:57 AM    BUN

## 2024-07-05 NOTE — PROGRESS NOTES
Wound Ostomy Continence Nursing  Follow Up Visit      NAME:  Rajani Iglesias  MEDICAL RECORD NUMBER:  [id number]  AGE: 63 y.o.   GENDER: female  : 1960  TODAY'S DATE:  2024    Subjective        Follow up for new ileostomy care and education.  The patient and  are both present during this visit.      Review of Systems:  Constitutional: negative for chills and fevers  Respiratory: negative for cough and shortness of breath  Cardiovascular: negative for chest pain and palpitations  Gastrointestinal: negative for abdominal pain and nausea  Genitourinary:negative  Integument: Midline abdominal incision closed with staples  Endocrine: negative  Musculoskeletal: Complains of General debility, history of chronic back pain  Behavioral/Psych: negative  Pain: Tolerable with opiates      Objective     Vitals:  BP (!) 150/90   Pulse 94   Temp 98.5 °F (36.9 °C) (Oral)   Resp 17   Ht 1.549 m (5' 0.98\")   Wt 56.3 kg (124 lb 1.9 oz) Comment: bed zeroed prior to weighing; RN notified  SpO2 96%   BMI 23.46 kg/m²    TEMPERATURE:  Current - Temp: 98.5 °F (36.9 °C); Max - Temp  Av.4 °F (36.9 °C)  Min: 98.2 °F (36.8 °C)  Max: 98.7 °F (37.1 °C)    Morteza Risk Score Morteza Scale Score: 19    INTAKE/OUTPUT:      Intake/Output Summary (Last 24 hours) at 2024 1317  Last data filed at 2024 0405  Gross per 24 hour   Intake --   Output 250 ml   Net -250 ml                 Physical Exam:  General Appearance: alert and oriented to person, place and time, well-developed and well-nourished, in no acute distress  Head: normocephalic and atraumatic  Pulmonary/Chest: normal air movement, no respiratory distress  Skin: Midline abdominal incision closed with staples.  The patient does have some serosanguineous drainage from the distal end of the incision line that has saturated dressing just placed this morning.  Ileostomy evaluated through the pouch.  Appears to be red.  Brown watery output in pouch. + flatus in pouch.

## 2024-07-05 NOTE — PLAN OF CARE
Problem: Discharge Planning  Goal: Discharge to home or other facility with appropriate resources  Outcome: Progressing     Problem: Safety - Adult  Goal: Free from fall injury  Outcome: Progressing     Problem: Chronic Conditions and Co-morbidities  Goal: Patient's chronic conditions and co-morbidity symptoms are monitored and maintained or improved  Outcome: Progressing     Problem: ABCDS Injury Assessment  Goal: Absence of physical injury  Outcome: Progressing     Problem: Gastrointestinal - Adult  Goal: Minimal or absence of nausea and vomiting  Outcome: Progressing

## 2024-07-05 NOTE — PROGRESS NOTES
(L) 1.6 - 2.6 mg/dL       Imaging/Diagnostics:  FL BARIUM ENEMA    Result Date: 7/2/2024  Findings of colonic obstruction are again demonstrated.  Unsuccessful passage of contrast beyond the site of obstruction in the redundant transverse colon in the left lower quadrant.     CT ABDOMEN PELVIS W IV CONTRAST Additional Contrast? None    Result Date: 7/2/2024  1. Large bowel obstruction with transition point seen in the redundant transverse colon seen in the left lower quadrant.  Underlying stricture malignancy difficult to exclude. 2. Distended gallbladder with a small amount of perihepatic ascites. Recommend clinical correlation for possibility of cholecystitis.       Assessment :      Hospital Problems             Last Modified POA    * (Principal) Large bowel obstruction (HCC) 7/2/2024 Yes    Diastolic heart failure (HCC) 7/2/2024 Yes    Hypothyroidism 7/2/2024 Yes   Plan:     Patient status inpatient in the Progressive Unit/Step down    1.  63-year-old female admitted with bowel obstruction, general surgery saw the patient emergently took her for the surgery,  Hypertension will continue to monitor, with IV medications as patient is n.p.o. at this time,  Hypothyroidism, will possibly start IV levothyroxine if patient does not start p.o. tomorrow,  DVT prophylaxis as per general surgery,  Full CODE STATUS  2. Disposition 3d      July 4  Seen and examined face-to-face,  Status post surgery for obstructed bowel,  NG out,  Blood pressure running on the high side, continue monitor,  IV hydration,  Pain control,  Started on diet,  Tolerating,  Passing gas,  PT OT    7/5/2024  Patient seen and examined  Currently with a colostomy bag, passing gas, has had a bowel movement  PT OT consulted to evaluate safety to discharge home  Reinitiate blood pressure medications at a lower dose  Avoid calcium channel blockers,  Urinary retention, begin Flomax.  Discontinue scopolamine patch.  Encourage ambulation      Consultations:

## 2024-07-06 VITALS
HEART RATE: 109 BPM | TEMPERATURE: 97.3 F | BODY MASS INDEX: 24.02 KG/M2 | SYSTOLIC BLOOD PRESSURE: 144 MMHG | RESPIRATION RATE: 16 BRPM | DIASTOLIC BLOOD PRESSURE: 95 MMHG | OXYGEN SATURATION: 100 % | WEIGHT: 127.21 LBS | HEIGHT: 61 IN

## 2024-07-06 LAB
ALBUMIN: 2.7 G/DL (ref 3.5–5.2)
ANION GAP SERPL CALCULATED.3IONS-SCNC: 12 MMOL/L (ref 9–17)
BUN SERPL-MCNC: 7 MG/DL (ref 8–23)
CALCIUM SERPL-MCNC: 7.3 MG/DL (ref 8.6–10.4)
CHLORIDE SERPL-SCNC: 96 MMOL/L (ref 98–107)
CO2 SERPL-SCNC: 25 MMOL/L (ref 20–31)
CREAT SERPL-MCNC: 0.6 MG/DL (ref 0.5–0.9)
GFR, ESTIMATED: >90 ML/MIN/1.73M2
GLUCOSE SERPL-MCNC: 102 MG/DL (ref 70–99)
PHOSPHATE SERPL-MCNC: 2.3 MG/DL (ref 2.6–4.5)
POTASSIUM SERPL-SCNC: 3.5 MMOL/L (ref 3.7–5.3)
SODIUM SERPL-SCNC: 133 MMOL/L (ref 135–144)

## 2024-07-06 PROCEDURE — 6360000002 HC RX W HCPCS

## 2024-07-06 PROCEDURE — 80069 RENAL FUNCTION PANEL: CPT

## 2024-07-06 PROCEDURE — 2580000003 HC RX 258

## 2024-07-06 PROCEDURE — 36415 COLL VENOUS BLD VENIPUNCTURE: CPT

## 2024-07-06 PROCEDURE — 6370000000 HC RX 637 (ALT 250 FOR IP): Performed by: INTERNAL MEDICINE

## 2024-07-06 RX ORDER — ONDANSETRON 4 MG/1
4 TABLET, ORALLY DISINTEGRATING ORAL EVERY 8 HOURS PRN
Qty: 30 TABLET | Refills: 2 | Status: SHIPPED | OUTPATIENT
Start: 2024-07-06 | End: 2024-08-05

## 2024-07-06 RX ORDER — TAMSULOSIN HYDROCHLORIDE 0.4 MG/1
0.4 CAPSULE ORAL DAILY
Qty: 30 CAPSULE | Refills: 3 | Status: SHIPPED | OUTPATIENT
Start: 2024-07-07

## 2024-07-06 RX ORDER — LANOLIN ALCOHOL/MO/W.PET/CERES
3 CREAM (GRAM) TOPICAL NIGHTLY PRN
Qty: 30 TABLET | Refills: 3 | Status: SHIPPED | OUTPATIENT
Start: 2024-07-06 | End: 2024-11-03

## 2024-07-06 RX ORDER — M-VIT,TX,IRON,MINS/CALC/FOLIC 27MG-0.4MG
1 TABLET ORAL DAILY
Qty: 120 TABLET | Refills: 4 | Status: SHIPPED | OUTPATIENT
Start: 2024-07-07

## 2024-07-06 RX ADMIN — MORPHINE SULFATE 4 MG: 4 INJECTION, SOLUTION INTRAMUSCULAR; INTRAVENOUS at 03:11

## 2024-07-06 RX ADMIN — Medication 400 MG: at 08:20

## 2024-07-06 RX ADMIN — MORPHINE SULFATE 4 MG: 4 INJECTION, SOLUTION INTRAMUSCULAR; INTRAVENOUS at 08:21

## 2024-07-06 RX ADMIN — BUSPIRONE HYDROCHLORIDE 30 MG: 15 TABLET ORAL at 08:20

## 2024-07-06 RX ADMIN — LEVOTHYROXINE SODIUM 100 MCG: 0.1 TABLET ORAL at 08:20

## 2024-07-06 RX ADMIN — FOLIC ACID 1 MG: 1 TABLET ORAL at 08:21

## 2024-07-06 RX ADMIN — ANTACID TABLETS 500 MG: 500 TABLET, CHEWABLE ORAL at 08:20

## 2024-07-06 RX ADMIN — PANTOPRAZOLE SODIUM 40 MG: 40 TABLET, DELAYED RELEASE ORAL at 08:21

## 2024-07-06 RX ADMIN — LISINOPRIL 5 MG: 5 TABLET ORAL at 08:20

## 2024-07-06 RX ADMIN — TAMSULOSIN HYDROCHLORIDE 0.4 MG: 0.4 CAPSULE ORAL at 08:20

## 2024-07-06 RX ADMIN — SODIUM CHLORIDE, PRESERVATIVE FREE 10 ML: 5 INJECTION INTRAVENOUS at 08:21

## 2024-07-06 ASSESSMENT — PAIN SCALES - GENERAL
PAINLEVEL_OUTOF10: 8
PAINLEVEL_OUTOF10: 8

## 2024-07-06 ASSESSMENT — PAIN DESCRIPTION - DESCRIPTORS: DESCRIPTORS: ACHING;TEARING

## 2024-07-06 ASSESSMENT — PAIN - FUNCTIONAL ASSESSMENT: PAIN_FUNCTIONAL_ASSESSMENT: ACTIVITIES ARE NOT PREVENTED

## 2024-07-06 ASSESSMENT — PAIN DESCRIPTION - LOCATION
LOCATION: BACK;ABDOMEN
LOCATION: BACK

## 2024-07-06 ASSESSMENT — PAIN DESCRIPTION - ORIENTATION: ORIENTATION: LOWER

## 2024-07-06 NOTE — PROGRESS NOTES
General Surgery Progress Note    Subjective:     Patient without complaints. No nausea or vomiting. Voiding well.  Tolerating diet with good stoma output.  Tolerating regular diet and pain is controlled    Scheduled Meds:   calcium carbonate  500 mg Oral TID    tamsulosin  0.4 mg Oral Daily    busPIRone  30 mg Oral BID    folic acid  1 mg Oral Daily    levothyroxine  100 mcg Oral Daily    lisinopril  5 mg Oral Daily    pantoprazole  40 mg Oral Daily    QUEtiapine  25 mg Oral Nightly    magnesium oxide  400 mg Oral BID    multivitamin  1 tablet Oral Daily    sodium chloride flush  5-40 mL IntraVENous 2 times per day     Continuous Infusions:   sodium chloride       PRN Meds:acetaminophen, metoprolol, morphine, sodium chloride flush, sodium chloride, potassium chloride **OR** potassium alternative oral replacement **OR** potassium chloride, magnesium sulfate, ondansetron **OR** ondansetron, melatonin, acetaminophen **OR** [DISCONTINUED] acetaminophen, hydrALAZINE, oxyCODONE    Objective:   BP (!) 152/92   Pulse 95   Temp 97.8 °F (36.6 °C) (Oral)   Resp 18   Ht 1.549 m (5' 0.98\")   Wt 57.7 kg (127 lb 3.3 oz)   SpO2 100%   BMI 24.05 kg/m²     I/O last 3 completed shifts:  In: -   Out: 1400 [Urine:875; Stool:525]    Chest: Breath sounds were clear and equal with no rales, wheezes, or rhonchi.  Respiratory effort was normal with no retractions or use of accessory muscles.    Cardiovascular: Heart sounds were normal with a regular rate and rhythm.  There were no murmurs or gallops.    Abdomen:  Bowel sounds were normal.  Stoma with good output.  Wound is clean.    LABS:  Lab Results   Component Value Date/Time    WBC 5.7 07/05/2024 07:46 PM    RBC 3.10 07/05/2024 07:46 PM    HGB 10.4 07/05/2024 07:46 PM    HCT 31.5 07/05/2024 07:46 PM    .4 07/05/2024 07:46 PM    MCH 33.5 07/05/2024 07:46 PM    MCHC 33.0 07/05/2024 07:46 PM    RDW 14.5 07/05/2024 07:46 PM     07/05/2024 07:46 PM    MPV 7.9 07/05/2024  07:46 PM       Lab Results   Component Value Date/Time     07/06/2024 05:30 AM    K 3.5 07/06/2024 05:30 AM    CL 96 07/06/2024 05:30 AM    CO2 25 07/06/2024 05:30 AM    BUN 7 07/06/2024 05:30 AM    CREATININE 0.6 07/06/2024 05:30 AM    GLUCOSE 102 07/06/2024 05:30 AM    CALCIUM 7.3 07/06/2024 05:30 AM       Assessment/Plan:   Status post right colectomy end ileostomy for colonic obstruction and serosal tears  Tolerating diet with good stoma output.  Able to be discharged.  Patient has pain medicine at home  Follow-up my office 10 days.      Electronically signed by Saad Bustillo DO  on 7/6/2024 at 8:19 AM

## 2024-07-06 NOTE — PLAN OF CARE
Problem: Discharge Planning  Goal: Discharge to home or other facility with appropriate resources  7/6/2024 1025 by Tory Puente RN  Outcome: Adequate for Discharge  Flowsheets (Taken 7/6/2024 0800)  Discharge to home or other facility with appropriate resources:   Identify barriers to discharge with patient and caregiver   Arrange for needed discharge resources and transportation as appropriate  7/6/2024 0453 by Chad Reyes RN  Outcome: Progressing     Problem: Safety - Adult  Goal: Free from fall injury  7/6/2024 1025 by Tory Puente RN  Outcome: Adequate for Discharge  7/6/2024 0453 by Chad Reyes RN  Outcome: Progressing     Problem: Chronic Conditions and Co-morbidities  Goal: Patient's chronic conditions and co-morbidity symptoms are monitored and maintained or improved  Outcome: Adequate for Discharge  Flowsheets (Taken 7/6/2024 0800)  Care Plan - Patient's Chronic Conditions and Co-Morbidity Symptoms are Monitored and Maintained or Improved:   Monitor and assess patient's chronic conditions and comorbid symptoms for stability, deterioration, or improvement   Update acute care plan with appropriate goals if chronic or comorbid symptoms are exacerbated and prevent overall improvement and discharge   Collaborate with multidisciplinary team to address chronic and comorbid conditions and prevent exacerbation or deterioration     Problem: ABCDS Injury Assessment  Goal: Absence of physical injury  Outcome: Adequate for Discharge     Problem: Gastrointestinal - Adult  Goal: Minimal or absence of nausea and vomiting  Outcome: Adequate for Discharge  Flowsheets (Taken 7/6/2024 0800)  Minimal or absence of nausea and vomiting: Administer IV fluids as ordered to ensure adequate hydration  Goal: Maintains or returns to baseline bowel function  Outcome: Adequate for Discharge  Flowsheets (Taken 7/6/2024 0800)  Maintains or returns to baseline bowel function:   Assess bowel function   Encourage oral fluids to

## 2024-07-06 NOTE — PLAN OF CARE
Problem: Discharge Planning  Goal: Discharge to home or other facility with appropriate resources  7/6/2024 0453 by Chad Reyes, RN  Outcome: Progressing    Problem: Safety - Adult  Goal: Free from fall injury  7/6/2024 0453 by Chad Reyes, RN  Outcome: Progressing

## 2024-07-07 NOTE — DISCHARGE SUMMARY
Inpatient Discharge Summary    BRIEF OVERVIEW  Admitting Provider: Urvashi Meredith MD  Discharge Provider: No att. providers found  Primary Care Physician: Giovanna Brasher [phone]     Admission Date: 7/2/2024     Discharge Date: 7/6/2024    Admission Diagnosis/Reason for Hospitalization:    Large bowel obstruction (HCC) [K56.609]      Primary Discharge Diagnosis  Large bowel obstruction      Secondary Discharge Diagnosis  Chronic constipation  Spinal stenosis with impaired ambulation    Discharge Disposition  Home        DETAILS OF HOSPITAL STAY    Presenting Problem/History of Present Illness  Large bowel obstruction (HCC) [K56.609]      Hospital Course  63-year-old female with poor functional status and chronic constipation presented to [facility] with large bowel obstruction and abdominal distention noted on CAT scan and confirmed with Gastroview enema.  Patient underwent operation of right colectomy with ileostomy due to serosal tears of the proximal colon noted at the time of surgery and resolved postoperative ileus and advance to regular diet and discharged home in stable condition.    Operative Procedures Performed  Procedure(s):  LAPAROTOMY EXPLORATORY W/COLECTOMY, ILEOSTOMY RIGHT LOWER QUADRANT      Consults: Internal medicine      Physical Exam at Discharge  Discharge Condition: good  Pulse: (!) 109  Respirations: 16  BP: (!) 144/95  Temp: 97.3 °F (36.3 °C)  Weight: 57.7 kg (127 lb 3.3 oz)  BP (!) 144/95   Pulse (!) 109   Temp 97.3 °F (36.3 °C) (Oral)   Resp 16   Ht 1.549 m (5' 0.98\")   Wt 57.7 kg (127 lb 3.3 oz)   SpO2 100%   BMI 24.05 kg/m²     General Appearance:    Alert, cooperative, no distress, appears stated age   Head:    Normocephalic, without obvious abnormality, atraumatic   Eyes:    PERRL, conjunctiva/corneas clear, EOM's intact, fundi     benign, both eyes   Ears:    Normal TM's and external ear canals, both ears   Nose:   Nares normal, septum midline, mucosa

## 2024-07-08 ENCOUNTER — CARE COORDINATION (OUTPATIENT)
Dept: CARE COORDINATION | Age: 64
End: 2024-07-08

## 2024-07-08 LAB — SURGICAL PATHOLOGY REPORT: NORMAL

## 2024-07-08 NOTE — PROGRESS NOTES
Physician Progress Note      PATIENT:               CHET GASPAR  CSN #:                  [id number]  :                       1960  ADMIT DATE:       2024 12:55 AM  DISCH DATE:        2024 10:28 AM  RESPONDING  PROVIDER #:        Navid Thurston DO          QUERY TEXT:    Pt admitted with large bowel obstruction and has diastolic CHF documented. If   possible, please document in progress notes and discharge summary further   specificity regarding the type and acuity of CHF:    The medical record reflects the following:  Risk Factors: 63 YOF, HTN, NSTEMI, former smoker  Clinical Indicators:  Cardiology consult note: Elevated troponin, likely   type II. Trop elevation, down-mcginnis trend with no acute ECG changes. denies CP,   or pressure Trop 88 -- 79 Diastolic dysfunction   Anesthesia note: 2023 Echo: Normal left ventricular systolic function.   EF by 2D Simpsons Biplane is 50%. Left ventricle is smaller than normal.   Mildly increased wall thickness. Normal wall motion. Normal diastolic   function.  7/3 IM note: Diastolic heart failure noted in Hospital Problems last modifiied   24.  Treatment: Cardiology consult,  Cardizem, Lopressor,    Thank You,  Giovanna Stone RN. C BSN  Clinical   O: [phone]   Options provided:  -- Chronic Diastolic CHF/HFpEF  -- Other - I will add my own diagnosis  -- Disagree - Not applicable / Not valid  -- Disagree - Clinically unable to determine / Unknown  -- Refer to Clinical Documentation Reviewer    PROVIDER RESPONSE TEXT:    This patient has chronic diastolic CHF/HFpEF.    Query created by: Giovanna Stone on 2024 12:33 PM      Electronically signed by:  Navid Thurston DO 2024 7:05 AM

## 2024-07-08 NOTE — CARE COORDINATION
Care Transitions Note    Initial Call - Call within 2 business days of discharge: Yes    Attempted to reach patient for transitions of care follow up. Unable to reach patient.    Outreach Attempts:   HIPAA compliant voicemail left for patient.     Patient: Rajani Iglesias    Patient : 1960   MRN: [id number]    Reason for Admission: large bowel obstruction/ colectomy with ileostomy  Discharge Date: 24  RURS: Readmission Risk Score: 16.5    Last Discharge Facility       Date Complaint Diagnosis Description Type Department Provider    24 Abdominal Pain; Bloated Large bowel obstruction (HCC) ... ED to Hosp-Admission (Discharged) (ADMITTED) Urvashi Doshi MD; [facility]..            Was this an external facility discharge? No    Follow Up Appointment:   Patient does not have a follow up appointment scheduled at time of call.  Will address with successful contact  Future Appointments         Provider Specialty Dept Phone    2024 10:30 AM Lupe De La Paz APRN - CNP Wound Ostomy [phone]    2024 3:30 PM Christy Lynn MD Physical Medicine and Rehab [phone]    10/23/2024 10:20 AM Giovanna Brasher MD Primary Care [phone]            Plan for follow-up on next business day.      Ronit Woodward RN

## 2024-07-09 ENCOUNTER — CARE COORDINATION (OUTPATIENT)
Dept: CARE COORDINATION | Age: 64
End: 2024-07-09

## 2024-07-09 DIAGNOSIS — K56.609 LARGE BOWEL OBSTRUCTION (HCC): Primary | ICD-10-CM

## 2024-07-09 NOTE — CARE COORDINATION
Care Transitions Note    Initial Call - Call within 2 business days of discharge: Yes    Patient Current Location:  Home: [address] [address]    Care Transition Nurse contacted the patient by telephone to perform post hospital discharge assessment, verified name and  as identifiers. Provided introduction to self, and explanation of the Care Transition Nurse role.     Patient: Rajani Iglesias    Patient : 1960   MRN: [id number]    Reason for Admission: large bowel obstruction  Discharge Date: 24  RURS: Readmission Risk Score: 16.5      Last Discharge Facility       Date Complaint Diagnosis Description Type Department Provider    24 Abdominal Pain; Bloated Large bowel obstruction (HCC) ... ED to Hosp-Admission (Discharged) (ADMITTED) Urvashi Doshi MD; Lianet Elizalde..            Was this an external facility discharge? No    Additional needs identified to be addressed with provider   High priority: Home health care- [facility] not able to see patient as they do not accept her insurance.              Method of communication with provider: chart routing.    Patients top risk factors for readmission: medical condition-complex medical problems and medication management, new stoma    Interventions to address risk factors: medical mgt: reviewed stoma care, caregiver in home, new medications, need for follow up appts; HHC: CTN to follow up on [facility]      Care Summary Note: Patient reached for 24h call. Reviewed home after IP stay, colectomy with new ostomy. Reviewed [facility], [facility] as noted in DC paperwork, [facility] have not called or been out to see her. Reviewed, CTN will follow up. States did not  new medications, melatonin, Flomax, ondansetron, or multivitamin; CTN reinforced importance of this, states will have spouse do so. States has caregiver who comes to home to assist her, provides transportation. Patient confirms has \"plenty\" of ostomy supplies in home. Reviewed wound/ostomy appt on

## 2024-07-10 ENCOUNTER — CARE COORDINATION (OUTPATIENT)
Dept: CARE COORDINATION | Age: 64
End: 2024-07-10

## 2024-07-10 NOTE — CARE COORDINATION
Care Transitions Note    Follow Up Call     Attempted to reach patient for transitions of care follow up.  Unable to reach patient.      Outreach Attempts:   Multiple attempts to contact patient, spouse/partner  at phone numbers on file.     Care Summary Note: Call to patient for follow up, VM box full, unable to leave ms. Call to S/O AMADO Emerson updating trying to reach patient.     Follow Up Appointment:   Future Appointments         Provider Specialty Dept Phone    7/16/2024 10:30 AM Lupe De La Paz APRN - CNP Wound Ostomy [phone]    9/18/2024 3:30 PM Christy Lynn MD Physical Medicine and Rehab [phone]    10/23/2024 10:20 AM Giovanna Brasher MD Primary Care [phone]            Plan for follow-up call in 6-10 days based on severity of symptoms and risk factors. Plan for next call: self management-managing stoma, meds picked up, HHC started?  follow-up appointment-PCP HFU appt scheduled?    Ronit Woodward RN

## 2024-07-10 NOTE — CARE COORDINATION
Call to [facility] requesting call back to verify patient referral received. Received VM from Leif, called back, verified referral received, SOC in for 7/11/24. Reviewed patient contact numbers.

## 2024-07-16 ENCOUNTER — CARE COORDINATION (OUTPATIENT)
Dept: CARE COORDINATION | Age: 64
End: 2024-07-16

## 2024-07-16 ENCOUNTER — TELEPHONE (OUTPATIENT)
Dept: WOUND CARE | Age: 64
End: 2024-07-16

## 2024-07-16 ENCOUNTER — HOSPITAL ENCOUNTER (OUTPATIENT)
Dept: WOUND CARE | Age: 64
Discharge: HOME | End: 2024-07-16

## 2024-07-16 NOTE — CARE COORDINATION
Care Transitions Note    Follow Up Call     Attempted to reach patient for transitions of care follow up.  Unable to reach patient.      Outreach Attempts:   Unable to leave message.     Care Summary Note: Follow up call attempt, VM box full, unable to leave message.    Follow Up Appointment:   Future Appointments         Provider Specialty Dept Phone    9/18/2024 3:30 PM Christy Lynn MD Physical Medicine and Rehab [phone]    10/23/2024 10:20 AM Giovanna Brasher MD Primary Care [phone]    12/23/2024 3:00 PM Christy Lynn MD Physical Medicine and Rehab [phone]            Plan for follow-up call in 6-10 days based on severity of symptoms and risk factors. Plan for next call: review if appts made, meds picked up, [facility] SOC completed      Ronit Woodward RN

## 2024-07-16 NOTE — TELEPHONE ENCOUNTER
The patient missed her 10:30 AM appointment this morning with the Ostomy Clinic. A call was placed to her and she states that she was not aware she had an appointment today. But she is doing well with the new ileostomy. She is managing pouching independently with help from her  and home care nursing. She does not feel as though she needs an ostomy clinic appointment at this time. She was provided with Ostomy Clinic contact information in case future needs arise.    Lupe De La Paz NP-C, CWS, C-WOCN  [facility]  Wound and Ostomy Services  [phone]

## 2024-07-16 NOTE — TELEPHONE ENCOUNTER
Patient's c wound/ostomy apt canceled for today, patient forgot about apt and said she is doing well and doesn't need to reschedule

## 2024-07-21 ENCOUNTER — APPOINTMENT (OUTPATIENT)
Dept: CT IMAGING | Age: 64
End: 2024-07-21
Payer: COMMERCIAL

## 2024-07-21 ENCOUNTER — HOSPITAL ENCOUNTER (INPATIENT)
Age: 64
LOS: 2 days | Discharge: HOME | End: 2024-07-23
Attending: EMERGENCY MEDICINE | Admitting: INTERNAL MEDICINE
Payer: COMMERCIAL

## 2024-07-21 ENCOUNTER — APPOINTMENT (OUTPATIENT)
Dept: GENERAL RADIOLOGY | Age: 64
End: 2024-07-21
Payer: COMMERCIAL

## 2024-07-21 DIAGNOSIS — A41.9 SEPTICEMIA (HCC): ICD-10-CM

## 2024-07-21 DIAGNOSIS — R41.0 DELIRIUM: ICD-10-CM

## 2024-07-21 DIAGNOSIS — Z93.2 ILEOSTOMY IN PLACE (HCC): ICD-10-CM

## 2024-07-21 DIAGNOSIS — N30.00 ACUTE CYSTITIS WITHOUT HEMATURIA: Primary | ICD-10-CM

## 2024-07-21 DIAGNOSIS — E61.1 IRON DEFICIENCY: ICD-10-CM

## 2024-07-21 DIAGNOSIS — D64.9 ANEMIA, NORMOCYTIC NORMOCHROMIC: ICD-10-CM

## 2024-07-21 PROBLEM — N39.0 UTI (URINARY TRACT INFECTION): Status: ACTIVE | Noted: 2024-07-21

## 2024-07-21 LAB
ALBUMIN SERPL-MCNC: 3.4 G/DL (ref 3.5–5.2)
ALP SERPL-CCNC: 90 U/L (ref 35–104)
ALT SERPL-CCNC: 12 U/L (ref 5–33)
ANION GAP SERPL CALCULATED.3IONS-SCNC: 15 MMOL/L (ref 9–17)
AST SERPL-CCNC: 24 U/L
BACTERIA URNS QL MICRO: ABNORMAL
BASOPHILS # BLD: 0 K/UL (ref 0–0.2)
BASOPHILS NFR BLD: 1 % (ref 0–2)
BILIRUB SERPL-MCNC: 0.5 MG/DL (ref 0.3–1.2)
BILIRUB UR QL STRIP: NEGATIVE
BUN SERPL-MCNC: 13 MG/DL (ref 8–23)
CALCIUM SERPL-MCNC: 10.3 MG/DL (ref 8.6–10.4)
CASTS #/AREA URNS LPF: ABNORMAL /LPF
CASTS #/AREA URNS LPF: ABNORMAL /LPF
CHLORIDE SERPL-SCNC: 98 MMOL/L (ref 98–107)
CLARITY UR: ABNORMAL
CO2 SERPL-SCNC: 28 MMOL/L (ref 20–31)
COLOR UR: YELLOW
CREAT SERPL-MCNC: 0.7 MG/DL (ref 0.5–0.9)
CRYSTALS URNS MICRO: ABNORMAL /HPF
CRYSTALS URNS MICRO: ABNORMAL /HPF
EOSINOPHIL # BLD: 0 K/UL (ref 0–0.4)
EOSINOPHILS RELATIVE PERCENT: 1 % (ref 0–4)
EPI CELLS #/AREA URNS HPF: ABNORMAL /HPF
ERYTHROCYTE [DISTWIDTH] IN BLOOD BY AUTOMATED COUNT: 14.5 % (ref 11.5–14.9)
GFR, ESTIMATED: >90 ML/MIN/1.73M2
GLUCOSE SERPL-MCNC: 141 MG/DL (ref 70–99)
GLUCOSE UR STRIP-MCNC: NEGATIVE MG/DL
HCT VFR BLD AUTO: 28.7 % (ref 36–46)
HGB BLD-MCNC: 9.7 G/DL (ref 12–16)
HGB UR QL STRIP.AUTO: NEGATIVE
INR PPP: 1
KETONES UR STRIP-MCNC: ABNORMAL MG/DL
LACTATE BLDV-SCNC: 1.1 MMOL/L (ref 0.5–1.9)
LACTATE BLDV-SCNC: 2.5 MMOL/L (ref 0.5–1.9)
LEUKOCYTE ESTERASE UR QL STRIP: ABNORMAL
LIPASE SERPL-CCNC: 23 U/L (ref 13–60)
LYMPHOCYTES NFR BLD: 0.8 K/UL (ref 1–4.8)
LYMPHOCYTES RELATIVE PERCENT: 20 % (ref 24–44)
MCH RBC QN AUTO: 34.9 PG (ref 26–34)
MCHC RBC AUTO-ENTMCNC: 33.9 G/DL (ref 31–37)
MCV RBC AUTO: 102.9 FL (ref 80–100)
MONOCYTES NFR BLD: 0.4 K/UL (ref 0.1–1.3)
MONOCYTES NFR BLD: 10 % (ref 1–7)
NEUTROPHILS NFR BLD: 68 % (ref 36–66)
NEUTS SEG NFR BLD: 2.8 K/UL (ref 1.3–9.1)
NITRITE UR QL STRIP: POSITIVE
PARTIAL THROMBOPLASTIN TIME: 29.5 SEC (ref 24–36)
PH UR STRIP: 6 [PH] (ref 5–8)
PLATELET # BLD AUTO: 226 K/UL (ref 150–450)
PMV BLD AUTO: 6.7 FL (ref 6–12)
POTASSIUM SERPL-SCNC: 3.6 MMOL/L (ref 3.7–5.3)
PROT SERPL-MCNC: 7 G/DL (ref 6.4–8.3)
PROT UR STRIP-MCNC: ABNORMAL MG/DL
PROTHROMBIN TIME: 14 SEC (ref 11.8–14.6)
RBC # BLD AUTO: 2.79 M/UL (ref 4–5.2)
RBC #/AREA URNS HPF: ABNORMAL /HPF
SODIUM SERPL-SCNC: 141 MMOL/L (ref 135–144)
SP GR UR STRIP: 1.02 (ref 1–1.03)
T4 FREE SERPL-MCNC: 1.1 NG/DL (ref 0.9–1.7)
TSH SERPL DL<=0.05 MIU/L-ACNC: 9.74 UIU/ML (ref 0.3–5)
UROBILINOGEN UR STRIP-ACNC: NORMAL EU/DL (ref 0–1)
WBC #/AREA URNS HPF: ABNORMAL /HPF
WBC OTHER # BLD: 4.1 K/UL (ref 3.5–11)
YEAST URNS QL MICRO: ABNORMAL

## 2024-07-21 PROCEDURE — 74177 CT ABD & PELVIS W/CONTRAST: CPT

## 2024-07-21 PROCEDURE — 87040 BLOOD CULTURE FOR BACTERIA: CPT

## 2024-07-21 PROCEDURE — 6370000000 HC RX 637 (ALT 250 FOR IP): Performed by: INTERNAL MEDICINE

## 2024-07-21 PROCEDURE — 80053 COMPREHEN METABOLIC PANEL: CPT

## 2024-07-21 PROCEDURE — 85025 COMPLETE CBC W/AUTO DIFF WBC: CPT

## 2024-07-21 PROCEDURE — 99223 1ST HOSP IP/OBS HIGH 75: CPT | Performed by: INTERNAL MEDICINE

## 2024-07-21 PROCEDURE — 6360000004 HC RX CONTRAST MEDICATION: Performed by: EMERGENCY MEDICINE

## 2024-07-21 PROCEDURE — 99285 EMERGENCY DEPT VISIT HI MDM: CPT

## 2024-07-21 PROCEDURE — 2060000000 HC ICU INTERMEDIATE R&B

## 2024-07-21 PROCEDURE — 71045 X-RAY EXAM CHEST 1 VIEW: CPT

## 2024-07-21 PROCEDURE — 2580000003 HC RX 258: Performed by: EMERGENCY MEDICINE

## 2024-07-21 PROCEDURE — 96365 THER/PROPH/DIAG IV INF INIT: CPT

## 2024-07-21 PROCEDURE — 84443 ASSAY THYROID STIM HORMONE: CPT

## 2024-07-21 PROCEDURE — 6360000002 HC RX W HCPCS: Performed by: EMERGENCY MEDICINE

## 2024-07-21 PROCEDURE — 87186 SC STD MICRODIL/AGAR DIL: CPT

## 2024-07-21 PROCEDURE — 84439 ASSAY OF FREE THYROXINE: CPT

## 2024-07-21 PROCEDURE — 87088 URINE BACTERIA CULTURE: CPT

## 2024-07-21 PROCEDURE — 83690 ASSAY OF LIPASE: CPT

## 2024-07-21 PROCEDURE — 85610 PROTHROMBIN TIME: CPT

## 2024-07-21 PROCEDURE — 2580000003 HC RX 258: Performed by: INTERNAL MEDICINE

## 2024-07-21 PROCEDURE — 81001 URINALYSIS AUTO W/SCOPE: CPT

## 2024-07-21 PROCEDURE — 87086 URINE CULTURE/COLONY COUNT: CPT

## 2024-07-21 PROCEDURE — 93005 ELECTROCARDIOGRAM TRACING: CPT | Performed by: EMERGENCY MEDICINE

## 2024-07-21 PROCEDURE — 83605 ASSAY OF LACTIC ACID: CPT

## 2024-07-21 PROCEDURE — 6360000002 HC RX W HCPCS: Performed by: INTERNAL MEDICINE

## 2024-07-21 PROCEDURE — 36415 COLL VENOUS BLD VENIPUNCTURE: CPT

## 2024-07-21 PROCEDURE — 85730 THROMBOPLASTIN TIME PARTIAL: CPT

## 2024-07-21 RX ORDER — VITAMIN B COMPLEX
2000 TABLET ORAL DAILY
Status: DISCONTINUED | OUTPATIENT
Start: 2024-07-21 | End: 2024-07-23 | Stop reason: HOSPADM

## 2024-07-21 RX ORDER — SODIUM CHLORIDE 9 MG/ML
INJECTION, SOLUTION INTRAVENOUS PRN
Status: DISCONTINUED | OUTPATIENT
Start: 2024-07-21 | End: 2024-07-23 | Stop reason: HOSPADM

## 2024-07-21 RX ORDER — ENOXAPARIN SODIUM 100 MG/ML
40 INJECTION SUBCUTANEOUS DAILY
Status: DISCONTINUED | OUTPATIENT
Start: 2024-07-21 | End: 2024-07-23 | Stop reason: HOSPADM

## 2024-07-21 RX ORDER — DILTIAZEM HYDROCHLORIDE 240 MG/1
240 CAPSULE, COATED, EXTENDED RELEASE ORAL DAILY
Status: DISCONTINUED | OUTPATIENT
Start: 2024-07-21 | End: 2024-07-23 | Stop reason: HOSPADM

## 2024-07-21 RX ORDER — POTASSIUM CHLORIDE 20 MEQ/1
40 TABLET, EXTENDED RELEASE ORAL PRN
Status: DISCONTINUED | OUTPATIENT
Start: 2024-07-21 | End: 2024-07-23 | Stop reason: HOSPADM

## 2024-07-21 RX ORDER — ONDANSETRON 4 MG/1
4 TABLET, ORALLY DISINTEGRATING ORAL EVERY 8 HOURS PRN
Status: DISCONTINUED | OUTPATIENT
Start: 2024-07-21 | End: 2024-07-23 | Stop reason: HOSPADM

## 2024-07-21 RX ORDER — DOCUSATE SODIUM 100 MG/1
100 CAPSULE, LIQUID FILLED ORAL 2 TIMES DAILY
Status: DISCONTINUED | OUTPATIENT
Start: 2024-07-21 | End: 2024-07-23 | Stop reason: HOSPADM

## 2024-07-21 RX ORDER — POLYETHYLENE GLYCOL 3350 17 G/17G
17 POWDER, FOR SOLUTION ORAL DAILY PRN
Status: DISCONTINUED | OUTPATIENT
Start: 2024-07-21 | End: 2024-07-21

## 2024-07-21 RX ORDER — LISINOPRIL 5 MG/1
5 TABLET ORAL DAILY
Status: DISCONTINUED | OUTPATIENT
Start: 2024-07-21 | End: 2024-07-23 | Stop reason: HOSPADM

## 2024-07-21 RX ORDER — ONDANSETRON 2 MG/ML
4 INJECTION INTRAMUSCULAR; INTRAVENOUS EVERY 6 HOURS PRN
Status: DISCONTINUED | OUTPATIENT
Start: 2024-07-21 | End: 2024-07-23 | Stop reason: HOSPADM

## 2024-07-21 RX ORDER — PANTOPRAZOLE SODIUM 40 MG/1
40 TABLET, DELAYED RELEASE ORAL DAILY
Status: DISCONTINUED | OUTPATIENT
Start: 2024-07-21 | End: 2024-07-23 | Stop reason: HOSPADM

## 2024-07-21 RX ORDER — SODIUM CHLORIDE 0.9 % (FLUSH) 0.9 %
5-40 SYRINGE (ML) INJECTION PRN
Status: DISCONTINUED | OUTPATIENT
Start: 2024-07-21 | End: 2024-07-23 | Stop reason: HOSPADM

## 2024-07-21 RX ORDER — SODIUM CHLORIDE 9 MG/ML
INJECTION, SOLUTION INTRAVENOUS CONTINUOUS
Status: DISCONTINUED | OUTPATIENT
Start: 2024-07-21 | End: 2024-07-23 | Stop reason: HOSPADM

## 2024-07-21 RX ORDER — SODIUM CHLORIDE 0.9 % (FLUSH) 0.9 %
5-40 SYRINGE (ML) INJECTION EVERY 12 HOURS SCHEDULED
Status: DISCONTINUED | OUTPATIENT
Start: 2024-07-21 | End: 2024-07-23 | Stop reason: HOSPADM

## 2024-07-21 RX ORDER — ACETAMINOPHEN 325 MG/1
650 TABLET ORAL EVERY 6 HOURS PRN
Status: DISCONTINUED | OUTPATIENT
Start: 2024-07-21 | End: 2024-07-23 | Stop reason: HOSPADM

## 2024-07-21 RX ORDER — BUSPIRONE HYDROCHLORIDE 15 MG/1
30 TABLET ORAL 2 TIMES DAILY
Status: DISCONTINUED | OUTPATIENT
Start: 2024-07-21 | End: 2024-07-23 | Stop reason: HOSPADM

## 2024-07-21 RX ORDER — 0.9 % SODIUM CHLORIDE 0.9 %
80 INTRAVENOUS SOLUTION INTRAVENOUS ONCE
Status: COMPLETED | OUTPATIENT
Start: 2024-07-21 | End: 2024-07-21

## 2024-07-21 RX ORDER — ATORVASTATIN CALCIUM 40 MG/1
40 TABLET, FILM COATED ORAL DAILY
Status: DISCONTINUED | OUTPATIENT
Start: 2024-07-21 | End: 2024-07-23 | Stop reason: HOSPADM

## 2024-07-21 RX ORDER — ASPIRIN 81 MG/1
81 TABLET ORAL DAILY
Status: DISCONTINUED | OUTPATIENT
Start: 2024-07-21 | End: 2024-07-23 | Stop reason: HOSPADM

## 2024-07-21 RX ORDER — FOLIC ACID 1 MG/1
1 TABLET ORAL DAILY
Status: DISCONTINUED | OUTPATIENT
Start: 2024-07-21 | End: 2024-07-23 | Stop reason: HOSPADM

## 2024-07-21 RX ORDER — MAGNESIUM SULFATE HEPTAHYDRATE 40 MG/ML
2000 INJECTION, SOLUTION INTRAVENOUS PRN
Status: DISCONTINUED | OUTPATIENT
Start: 2024-07-21 | End: 2024-07-23 | Stop reason: HOSPADM

## 2024-07-21 RX ORDER — LEVOTHYROXINE SODIUM 0.1 MG/1
100 TABLET ORAL DAILY
Status: DISCONTINUED | OUTPATIENT
Start: 2024-07-21 | End: 2024-07-23 | Stop reason: HOSPADM

## 2024-07-21 RX ORDER — QUETIAPINE FUMARATE 50 MG/1
25 TABLET, FILM COATED ORAL NIGHTLY
Status: DISCONTINUED | OUTPATIENT
Start: 2024-07-21 | End: 2024-07-23 | Stop reason: HOSPADM

## 2024-07-21 RX ORDER — SODIUM CHLORIDE 0.9 % (FLUSH) 0.9 %
10 SYRINGE (ML) INJECTION PRN
Status: DISCONTINUED | OUTPATIENT
Start: 2024-07-21 | End: 2024-07-23 | Stop reason: HOSPADM

## 2024-07-21 RX ORDER — 0.9 % SODIUM CHLORIDE 0.9 %
30 INTRAVENOUS SOLUTION INTRAVENOUS ONCE
Status: COMPLETED | OUTPATIENT
Start: 2024-07-21 | End: 2024-07-21

## 2024-07-21 RX ORDER — POTASSIUM CHLORIDE 7.45 MG/ML
10 INJECTION INTRAVENOUS PRN
Status: DISCONTINUED | OUTPATIENT
Start: 2024-07-21 | End: 2024-07-23 | Stop reason: HOSPADM

## 2024-07-21 RX ORDER — HYDROCODONE BITARTRATE AND ACETAMINOPHEN 5; 325 MG/1; MG/1
1 TABLET ORAL 2 TIMES DAILY
Status: DISCONTINUED | OUTPATIENT
Start: 2024-07-21 | End: 2024-07-23 | Stop reason: HOSPADM

## 2024-07-21 RX ORDER — ACETAMINOPHEN 650 MG/1
650 SUPPOSITORY RECTAL EVERY 6 HOURS PRN
Status: DISCONTINUED | OUTPATIENT
Start: 2024-07-21 | End: 2024-07-23 | Stop reason: HOSPADM

## 2024-07-21 RX ADMIN — IOPAMIDOL 75 ML: 755 INJECTION, SOLUTION INTRAVENOUS at 14:27

## 2024-07-21 RX ADMIN — HYDROCODONE BITARTRATE AND ACETAMINOPHEN 1 TABLET: 5; 325 TABLET ORAL at 20:33

## 2024-07-21 RX ADMIN — PIPERACILLIN AND TAZOBACTAM 4500 MG: 4; .5 INJECTION, POWDER, LYOPHILIZED, FOR SOLUTION INTRAVENOUS at 13:15

## 2024-07-21 RX ADMIN — SODIUM CHLORIDE 80 ML: 9 INJECTION, SOLUTION INTRAVENOUS at 14:27

## 2024-07-21 RX ADMIN — LISINOPRIL 5 MG: 5 TABLET ORAL at 20:33

## 2024-07-21 RX ADMIN — SODIUM CHLORIDE: 9 INJECTION, SOLUTION INTRAVENOUS at 18:49

## 2024-07-21 RX ADMIN — ENOXAPARIN SODIUM 40 MG: 100 INJECTION SUBCUTANEOUS at 20:34

## 2024-07-21 RX ADMIN — DILTIAZEM HYDROCHLORIDE 240 MG: 240 CAPSULE, EXTENDED RELEASE ORAL at 20:32

## 2024-07-21 RX ADMIN — Medication 2000 UNITS: at 20:34

## 2024-07-21 RX ADMIN — ASPIRIN 81 MG: 81 TABLET, COATED ORAL at 20:32

## 2024-07-21 RX ADMIN — SODIUM CHLORIDE, PRESERVATIVE FREE 10 ML: 5 INJECTION INTRAVENOUS at 20:35

## 2024-07-21 RX ADMIN — LEVOTHYROXINE SODIUM 100 MCG: 0.1 TABLET ORAL at 20:36

## 2024-07-21 RX ADMIN — FOLIC ACID 1 MG: 1 TABLET ORAL at 20:32

## 2024-07-21 RX ADMIN — SODIUM CHLORIDE 200 ML: 9 INJECTION, SOLUTION INTRAVENOUS at 20:54

## 2024-07-21 RX ADMIN — SODIUM CHLORIDE 1743 ML: 9 INJECTION, SOLUTION INTRAVENOUS at 13:11

## 2024-07-21 RX ADMIN — PIPERACILLIN AND TAZOBACTAM 3375 MG: 3; .375 INJECTION, POWDER, LYOPHILIZED, FOR SOLUTION INTRAVENOUS at 20:55

## 2024-07-21 RX ADMIN — QUETIAPINE FUMARATE 25 MG: 50 TABLET ORAL at 20:33

## 2024-07-21 RX ADMIN — SODIUM CHLORIDE, PRESERVATIVE FREE 10 ML: 5 INJECTION INTRAVENOUS at 14:27

## 2024-07-21 RX ADMIN — PANTOPRAZOLE SODIUM 40 MG: 40 TABLET, DELAYED RELEASE ORAL at 20:33

## 2024-07-21 ASSESSMENT — LIFESTYLE VARIABLES
HOW OFTEN DO YOU HAVE A DRINK CONTAINING ALCOHOL: NEVER
HOW MANY STANDARD DRINKS CONTAINING ALCOHOL DO YOU HAVE ON A TYPICAL DAY: PATIENT DOES NOT DRINK

## 2024-07-21 ASSESSMENT — PAIN SCALES - GENERAL: PAINLEVEL_OUTOF10: 5

## 2024-07-21 ASSESSMENT — PAIN DESCRIPTION - LOCATION: LOCATION: ABDOMEN

## 2024-07-21 ASSESSMENT — PAIN - FUNCTIONAL ASSESSMENT: PAIN_FUNCTIONAL_ASSESSMENT: PREVENTS OR INTERFERES SOME ACTIVE ACTIVITIES AND ADLS

## 2024-07-21 ASSESSMENT — PAIN DESCRIPTION - DESCRIPTORS: DESCRIPTORS: ACHING

## 2024-07-21 NOTE — ED PROVIDER NOTES
eMERGENCY dEPARTMENT eNCOUnter      Pt Name: Rajani Iglesias  MRN: [id number]  Birthdate 1960  Date of evaluation: 7/21/24      CHIEF COMPLAINT       Chief Complaint   Patient presents with    Wound Infection         HISTORY OF PRESENT ILLNESS    Rajani Iglesias is a 64 y.o. female who presents complaining of altered mental status.  Patient is about 2 weeks post having a colectomy done with an ileostomy.  Patient recently had her staples removed and they noticed a little bit of drainage so they put a dressing on it.  Evidently was told to just watch this but not any antibiotics.  Yesterday evidently patient started becoming lethargic not really responding well some altered mental status.  Patient has had no fever at home no vomiting.  Urination seems normal.  No change in the stool output.      REVIEW OF SYSTEMS       Review of Systems   Constitutional:  Positive for fatigue. Negative for activity change, appetite change, chills, diaphoresis and fever.   HENT:  Negative for congestion, ear pain, facial swelling, nosebleeds, rhinorrhea, sinus pressure, sore throat and trouble swallowing.    Eyes:  Negative for pain, discharge and redness.   Respiratory:  Negative for cough, chest tightness, shortness of breath and wheezing.    Cardiovascular:  Negative for chest pain, palpitations and leg swelling.   Gastrointestinal:  Positive for abdominal pain. Negative for blood in stool, constipation, diarrhea, nausea and vomiting.   Genitourinary:  Negative for difficulty urinating, dysuria, flank pain, frequency, genital sores and hematuria.   Musculoskeletal:  Negative for arthralgias, back pain, gait problem, joint swelling, myalgias and neck pain.   Skin:  Positive for wound. Negative for color change, pallor and rash.   Neurological:  Negative for dizziness, tremors, seizures, syncope, speech difficulty, weakness, numbness and headaches.   Psychiatric/Behavioral:  Positive for confusion. Negative for decreased

## 2024-07-21 NOTE — ED NOTES
Report given to DENA Rendon from [facility].   Report method by phone   The following was reviewed with receiving RN:   Current vital signs:  /75   Pulse 88   Temp 98.4 °F (36.9 °C) (Oral)   Resp 16   Wt 58.1 kg (128 lb)   SpO2 96%   BMI 24.20 kg/m²                MEWS Score: 4     Any medication or safety alerts were reviewed. Any pending diagnostics and notifications were also reviewed, as well as any safety concerns or issues, abnormal labs, abnormal imaging, and abnormal assessment findings. Questions were answered.     Transport was requested.

## 2024-07-21 NOTE — H&P
[facility]   IN-PATIENT SERVICE   [facility]    HISTORY AND PHYSICAL EXAMINATION            Date:   7/21/2024  Patient name:  Rajani Iglesias  Date of admission:  7/21/2024 12:05 PM  MRN:   [id number]  Account:  [id number]  YOB: 1960  PCP:    Giovanna Brasher MD  Room:   09/09  Code Status:    Prior    Chief Complaint:     Chief Complaint   Patient presents with    Wound Infection       History Obtained From:     patient, electronic medical record    History of Present Illness:     The patient is a 64 y.o.  Non- / non  female who presents with Wound Infection   and she is admitted to the hospital for the management of UTI, wound infection at the site of sutures  Patient, has past medical history multiple medical problem which include hypertension, hypothyroidism, history of stroke, history of fatty liver disease, cervical myelopathy s/p surgery, history of lumbar stenosis s/p surgery, heart failure preserved ejection fraction, history of breast cancer  Patient had recent surgery for bowel obstruction, she was admitted at [facility], underwent right colectomy and ileostomy formation  Patient had a large midline abdominal incision, and sutures were removed recently at the general surgery office  Patient came to emergency room, complaining of generalized fatigue, there is serous drainage from the site of sutures, general surgery consulted from emergency room  CT abdomen pelvis done which is negative for intra-abdominal abscesses  UA concerning for UTI  Patient started on antibiotic  Lactic acid in emergency room was negative  Patient, tolerating regular diet, has good output from ileostomy, no nausea, vomiting, loose stools      Past Medical History:     Past Medical History:   Diagnosis Date    Anxiety     Arthritis     At maximum risk for fall 12/29/2021    caudal equina syndrome and cervical stenosis    Breast cancer (HCC)     Brief psychotic disorder  Collection Time: 07/21/24 12:59 PM   Result Value Ref Range    Color, UA Yellow Yellow    Turbidity UA Cloudy (A) Clear    Glucose, Ur NEGATIVE NEGATIVE mg/dL    Bilirubin, Urine NEGATIVE NEGATIVE    Ketones, Urine TRACE (A) NEGATIVE mg/dL    Specific Gravity, UA 1.016 1.000 - 1.030    Urine Hgb NEGATIVE NEGATIVE    pH, Urine 6.0 5.0 - 8.0    Protein, UA 1+ (A) NEGATIVE mg/dL    Urobilinogen, Urine Normal 0.0 - 1.0 EU/dL    Nitrite, Urine POSITIVE (A) NEGATIVE    Leukocyte Esterase, Urine SMALL (A) NEGATIVE   Microscopic Urinalysis    Collection Time: 07/21/24 12:59 PM   Result Value Ref Range    WBC, UA 10 TO 20 (A) 0 TO 5 /HPF    RBC, UA 0 TO 2 0 TO 2 /HPF    Casts UA 3 to 5 (A) None /LPF    Casts UA HYALINE (A) None /LPF    Crystals, UA FEW (A) None /HPF    Crystals, UA CALCIUM OXALATE (A) None /HPF    Epithelial Cells, UA 3 to 5 /HPF    Bacteria, UA MANY (A) None    Yeast, UA FEW (A) None   EKG 12 lead    Collection Time: 07/21/24 12:59 PM   Result Value Ref Range    Ventricular Rate 84 BPM    Atrial Rate 84 BPM    P-R Interval 130 ms    QRS Duration 90 ms    Q-T Interval 410 ms    QTc Calculation (Bazett) 484 ms    P Axis 42 degrees    R Axis 36 degrees    T Axis 76 degrees   Lactate, Sepsis    Collection Time: 07/21/24  2:50 PM   Result Value Ref Range    Lactic Acid, Sepsis 1.1 0.5 - 1.9 mmol/L       Imaging/Diagnostics:      Assessment :      Primary Problem  UTI (urinary tract infection)    Active Hospital Problems    Diagnosis Date Noted    UTI (urinary tract infection) [N39.0] 07/21/2024       Plan:     Patient status Admit as inpatient in the  Progressive Unit/Step down    UTI, patient started on Zosyn, urine culture pending  Concern for infection at the site of sutures, CT abdomen pelvis is negative, general surgery presented from emergency room, will continue with Zosyn, wound culture at the local site  Hypothyroidism, resuming home dose Synthroid, checking TSH  Hypertension, resuming home

## 2024-07-21 NOTE — ED NOTES
Mode of arrival (squad #, walk in, police, etc) : walk in        Chief complaint(s): abdominal pain, wound check        Arrival Note (brief scenario, treatment PTA, etc).: Pt arrives to the ED with the complaint od abdominal pain and wound check. Pt has a new colostomy from a bowel impaction. Pt has a large surgical scar that at the bottom is leaking. Pt family states she has been weak, seems confused and very lethargic per her normal. They state it started leaking green fluid which is seen on the dressing by this RN. Pt is tachy, dr león notified and at bedside to evaluate patient. Pt family states the confusion and lethargic is new since yesterday.         C= \"Have you ever felt that you should Cut down on your drinking?\"  No  A= \"Have people Annoyed you by criticizing your drinking?\"  No  G= \"Have you ever felt bad or Guilty about your drinking?\"  No  E= \"Have you ever had a drink as an Eye-opener first thing in the morning to steady your nerves or to help a hangover?\"  No      Deferred []      Reason for deferring: N/A    *If yes to two or more: probable alcohol abuse.*

## 2024-07-22 LAB
ANION GAP SERPL CALCULATED.3IONS-SCNC: 11 MMOL/L (ref 9–17)
BASOPHILS # BLD: 0.03 K/UL (ref 0–0.2)
BASOPHILS NFR BLD: 1 % (ref 0–2)
BUN SERPL-MCNC: 7 MG/DL (ref 8–23)
CALCIUM SERPL-MCNC: 7.8 MG/DL (ref 8.6–10.4)
CHLORIDE SERPL-SCNC: 103 MMOL/L (ref 98–107)
CO2 SERPL-SCNC: 26 MMOL/L (ref 20–31)
CREAT SERPL-MCNC: 0.6 MG/DL (ref 0.5–0.9)
EOSINOPHIL # BLD: 0.06 K/UL (ref 0–0.4)
EOSINOPHILS RELATIVE PERCENT: 2 % (ref 0–4)
ERYTHROCYTE [DISTWIDTH] IN BLOOD BY AUTOMATED COUNT: 14.6 % (ref 11.5–14.9)
GFR, ESTIMATED: >90 ML/MIN/1.73M2
GLUCOSE SERPL-MCNC: 78 MG/DL (ref 70–99)
HCT VFR BLD AUTO: 23.7 % (ref 36–46)
HGB BLD-MCNC: 8 G/DL (ref 12–16)
LYMPHOCYTES NFR BLD: 0.93 K/UL (ref 1–4.8)
LYMPHOCYTES RELATIVE PERCENT: 30 % (ref 24–44)
MAGNESIUM SERPL-MCNC: 0.9 MG/DL (ref 1.6–2.6)
MAGNESIUM SERPL-MCNC: 2.3 MG/DL (ref 1.6–2.6)
MCH RBC QN AUTO: 34.9 PG (ref 26–34)
MCHC RBC AUTO-ENTMCNC: 33.7 G/DL (ref 31–37)
MCV RBC AUTO: 103.6 FL (ref 80–100)
MONOCYTES NFR BLD: 0.34 K/UL (ref 0.1–1.3)
MONOCYTES NFR BLD: 11 % (ref 1–7)
MORPHOLOGY: ABNORMAL
NEUTROPHILS NFR BLD: 56 % (ref 36–66)
NEUTS SEG NFR BLD: 1.74 K/UL (ref 1.3–9.1)
PLATELET # BLD AUTO: 186 K/UL (ref 150–450)
PMV BLD AUTO: 7.1 FL (ref 6–12)
POTASSIUM SERPL-SCNC: 3.3 MMOL/L (ref 3.7–5.3)
RBC # BLD AUTO: 2.29 M/UL (ref 4–5.2)
SODIUM SERPL-SCNC: 140 MMOL/L (ref 135–144)
WBC OTHER # BLD: 3.1 K/UL (ref 3.5–11)

## 2024-07-22 PROCEDURE — 97530 THERAPEUTIC ACTIVITIES: CPT

## 2024-07-22 PROCEDURE — 51798 US URINE CAPACITY MEASURE: CPT

## 2024-07-22 PROCEDURE — 97166 OT EVAL MOD COMPLEX 45 MIN: CPT

## 2024-07-22 PROCEDURE — 83735 ASSAY OF MAGNESIUM: CPT

## 2024-07-22 PROCEDURE — 2580000003 HC RX 258: Performed by: INTERNAL MEDICINE

## 2024-07-22 PROCEDURE — 87086 URINE CULTURE/COLONY COUNT: CPT

## 2024-07-22 PROCEDURE — 99212 OFFICE O/P EST SF 10 MIN: CPT

## 2024-07-22 PROCEDURE — 85025 COMPLETE CBC W/AUTO DIFF WBC: CPT

## 2024-07-22 PROCEDURE — 97161 PT EVAL LOW COMPLEX 20 MIN: CPT

## 2024-07-22 PROCEDURE — 2580000003 HC RX 258: Performed by: EMERGENCY MEDICINE

## 2024-07-22 PROCEDURE — 2500000003 HC RX 250 WO HCPCS: Performed by: INTERNAL MEDICINE

## 2024-07-22 PROCEDURE — 6370000000 HC RX 637 (ALT 250 FOR IP): Performed by: NURSE PRACTITIONER

## 2024-07-22 PROCEDURE — 80048 BASIC METABOLIC PNL TOTAL CA: CPT

## 2024-07-22 PROCEDURE — 36415 COLL VENOUS BLD VENIPUNCTURE: CPT

## 2024-07-22 PROCEDURE — 6370000000 HC RX 637 (ALT 250 FOR IP): Performed by: INTERNAL MEDICINE

## 2024-07-22 PROCEDURE — 6360000002 HC RX W HCPCS: Performed by: INTERNAL MEDICINE

## 2024-07-22 PROCEDURE — 99232 SBSQ HOSP IP/OBS MODERATE 35: CPT | Performed by: INTERNAL MEDICINE

## 2024-07-22 PROCEDURE — 2060000000 HC ICU INTERMEDIATE R&B

## 2024-07-22 RX ORDER — METHADONE HYDROCHLORIDE 10 MG/1
10 TABLET ORAL 2 TIMES DAILY
Status: DISCONTINUED | OUTPATIENT
Start: 2024-07-22 | End: 2024-07-23 | Stop reason: HOSPADM

## 2024-07-22 RX ADMIN — QUETIAPINE FUMARATE 25 MG: 50 TABLET ORAL at 21:18

## 2024-07-22 RX ADMIN — SODIUM CHLORIDE, PRESERVATIVE FREE 10 ML: 5 INJECTION INTRAVENOUS at 08:39

## 2024-07-22 RX ADMIN — ASPIRIN 81 MG: 81 TABLET, COATED ORAL at 08:29

## 2024-07-22 RX ADMIN — PANTOPRAZOLE SODIUM 40 MG: 40 TABLET, DELAYED RELEASE ORAL at 08:28

## 2024-07-22 RX ADMIN — MAGNESIUM SULFATE HEPTAHYDRATE 2000 MG: 40 INJECTION, SOLUTION INTRAVENOUS at 10:01

## 2024-07-22 RX ADMIN — DILTIAZEM HYDROCHLORIDE 240 MG: 240 CAPSULE, EXTENDED RELEASE ORAL at 08:29

## 2024-07-22 RX ADMIN — HYDROCODONE BITARTRATE AND ACETAMINOPHEN 1 TABLET: 5; 325 TABLET ORAL at 21:17

## 2024-07-22 RX ADMIN — SODIUM CHLORIDE 950 ML: 9 INJECTION, SOLUTION INTRAVENOUS at 04:26

## 2024-07-22 RX ADMIN — LEVOTHYROXINE SODIUM 100 MCG: 0.1 TABLET ORAL at 08:29

## 2024-07-22 RX ADMIN — FOLIC ACID 1 MG: 1 TABLET ORAL at 08:29

## 2024-07-22 RX ADMIN — SODIUM CHLORIDE, PRESERVATIVE FREE 10 ML: 5 INJECTION INTRAVENOUS at 19:25

## 2024-07-22 RX ADMIN — HYDROCODONE BITARTRATE AND ACETAMINOPHEN 1 TABLET: 5; 325 TABLET ORAL at 08:28

## 2024-07-22 RX ADMIN — DOCUSATE SODIUM 100 MG: 100 CAPSULE, LIQUID FILLED ORAL at 21:19

## 2024-07-22 RX ADMIN — MAGNESIUM SULFATE HEPTAHYDRATE 2000 MG: 40 INJECTION, SOLUTION INTRAVENOUS at 08:04

## 2024-07-22 RX ADMIN — ATORVASTATIN CALCIUM 40 MG: 40 TABLET, FILM COATED ORAL at 08:29

## 2024-07-22 RX ADMIN — SODIUM CHLORIDE: 9 INJECTION, SOLUTION INTRAVENOUS at 13:53

## 2024-07-22 RX ADMIN — LISINOPRIL 5 MG: 5 TABLET ORAL at 08:37

## 2024-07-22 RX ADMIN — PIPERACILLIN AND TAZOBACTAM 3375 MG: 3; .375 INJECTION, POWDER, LYOPHILIZED, FOR SOLUTION INTRAVENOUS at 11:02

## 2024-07-22 RX ADMIN — POTASSIUM BICARBONATE 40 MEQ: 782 TABLET, EFFERVESCENT ORAL at 07:00

## 2024-07-22 RX ADMIN — SODIUM CHLORIDE: 9 INJECTION, SOLUTION INTRAVENOUS at 11:02

## 2024-07-22 RX ADMIN — METHADONE HYDROCHLORIDE 10 MG: 10 TABLET ORAL at 21:18

## 2024-07-22 RX ADMIN — DOCUSATE SODIUM 100 MG: 100 CAPSULE, LIQUID FILLED ORAL at 08:29

## 2024-07-22 RX ADMIN — PIPERACILLIN AND TAZOBACTAM 3375 MG: 3; .375 INJECTION, POWDER, LYOPHILIZED, FOR SOLUTION INTRAVENOUS at 02:29

## 2024-07-22 RX ADMIN — ENOXAPARIN SODIUM 40 MG: 100 INJECTION SUBCUTANEOUS at 08:40

## 2024-07-22 RX ADMIN — PIPERACILLIN AND TAZOBACTAM 3375 MG: 3; .375 INJECTION, POWDER, LYOPHILIZED, FOR SOLUTION INTRAVENOUS at 18:28

## 2024-07-22 ASSESSMENT — PAIN DESCRIPTION - PAIN TYPE
TYPE: CHRONIC PAIN
TYPE: SURGICAL PAIN

## 2024-07-22 ASSESSMENT — ENCOUNTER SYMPTOMS
PHOTOPHOBIA: 0
APNEA: 0
EYE DISCHARGE: 0
BACK PAIN: 1
ABDOMINAL PAIN: 0
SHORTNESS OF BREATH: 0
EYE PAIN: 0
BLOOD IN STOOL: 0
ABDOMINAL DISTENTION: 0
CHOKING: 0
COLOR CHANGE: 0

## 2024-07-22 ASSESSMENT — PAIN DESCRIPTION - LOCATION
LOCATION: BACK
LOCATION: NECK;ABDOMEN
LOCATION: ABDOMEN
LOCATION: BACK

## 2024-07-22 ASSESSMENT — PAIN DESCRIPTION - DESCRIPTORS
DESCRIPTORS: ACHING;DISCOMFORT
DESCRIPTORS: ACHING
DESCRIPTORS: TEARING
DESCRIPTORS: ACHING;DISCOMFORT

## 2024-07-22 ASSESSMENT — PAIN SCALES - GENERAL
PAINLEVEL_OUTOF10: 7
PAINLEVEL_OUTOF10: 8
PAINLEVEL_OUTOF10: 6

## 2024-07-22 ASSESSMENT — PAIN DESCRIPTION - FREQUENCY
FREQUENCY: CONTINUOUS
FREQUENCY: CONTINUOUS

## 2024-07-22 ASSESSMENT — PAIN DESCRIPTION - ORIENTATION
ORIENTATION: MID
ORIENTATION: MID
ORIENTATION: LOWER
ORIENTATION: LOWER

## 2024-07-22 ASSESSMENT — PAIN - FUNCTIONAL ASSESSMENT
PAIN_FUNCTIONAL_ASSESSMENT: PREVENTS OR INTERFERES SOME ACTIVE ACTIVITIES AND ADLS
PAIN_FUNCTIONAL_ASSESSMENT: PREVENTS OR INTERFERES SOME ACTIVE ACTIVITIES AND ADLS
PAIN_FUNCTIONAL_ASSESSMENT: ACTIVITIES ARE NOT PREVENTED
PAIN_FUNCTIONAL_ASSESSMENT: ACTIVITIES ARE NOT PREVENTED

## 2024-07-22 ASSESSMENT — PAIN DESCRIPTION - ONSET: ONSET: ON-GOING

## 2024-07-22 NOTE — CARE COORDINATION
AM-PAC:   /24     Family can provide assistance at DC: Yes  Would you like Case Management to discuss the discharge plan with any other family members/significant others, and if so, who? No  Plans to Return to Present Housing: Yes  Other Identified Issues/Barriers to RETURNING to current housing: Unsure of Post op needs.   Potential Assistance needed at discharge: Skilled Nursing Facility, Home Care            Potential DME:    Patient expects to discharge to: Unknown  Plan for transportation at discharge: Other (see comment) (Family)     Financial     Payor: MEDICARE / Plan: MEDICARE PART A AND B / Product Type: *No Product type* /      Does insurance require precert for SNF: No     Potential assistance Purchasing Medications: No  Meds-to-Beds request:          [facility] [id number] - [place], [place] - [address] - P [phone] - F [phone]  [address]  [facility] [address]  Phone: [phone] Fax: [phone]        Notes:     Factors facilitating achievement of predicted outcomes: Family support, Cooperative, Pleasant, and Has needed Durable Medical Equipment at home     Barriers to discharge: Unsure of Post Op needs.      Additional Case Management Notes: 7/22/24 Medicare Pt. Lives in 2 story home, has liveable 1st floor, W/ . DME- MINDI, Kareem Noriega, WC, [place], DARIUSS is current with [facility] will send a referral.   Follows at [facility] for her Methadone. /Rec. [place] 24%, Nancy will need signed/completed//tv      The Plan for Transition of Care is related to the following treatment goals of Delirium [R41.0]  UTI (urinary tract infection) [N39.0]  Septicemia (HCC) [A41.9]  Acute cystitis without hematuria [N30.00]    IF APPLICABLE: The Patient and/or patient representative Rajani and her family were provided with a choice of provider and agrees with the discharge plan. Freedom of choice list with basic dialogue that supports the patient's individualized plan of care/goals and shares the  quality data associated with the providers was provided to: Patient   Patient Representative Name:       The Patient and/or Patient Representative Agree with the Discharge Plan? Yes    Myrtle Stover RN  Case Management Department  Ph:  Fax:

## 2024-07-22 NOTE — CONSULTS
[facility]  Consult Note       NAME:  Rajani Iglesias  MEDICAL RECORD NUMBER:  [id number]  AGE: 64 y.o.   GENDER: female  : 1960  TODAY'S DATE:  2024    Subjective:      Rajani Iglesias is a 64 y.o. female with inpatient referral to [facility] for:  Abdominal wound      Wound Identification:  Wound Type: non-healing surgical  Contributing Factors: none    Wound History: Patient has history of large bowel obstruction. Right colectomy and partial omentectomy with ileostomy creation on  by Dr Bustillo.   Current Wound Care Treatment:  dry dressing    Patient Goal of Care:  [x] Wound Healing  [] Odor Control  [] Palliative Care  [] Pain Control   [] Other:         PAST MEDICAL HISTORY        Diagnosis Date    Anxiety     Arthritis     At maximum risk for fall 2021    caudal equina syndrome and cervical stenosis    Breast cancer ([facility])     Brief psychotic disorder ([facility]) 2024    Cancer ([facility])     right breast    Cauda equina syndrome ([facility]) 2021    neuropathy, mobility difficulty, incontinance    Cerebrovascular accident ([facility]) 2024    Cervical stenosis of spine 2021    GERD (gastroesophageal reflux disease)     History of stomach ulcers     History of therapeutic radiation     Hx antineoplastic chemo     Hypertension     Dr. MARY LOU Brasher    Hypothyroidism     Lumbar neuritis     NSTEMI (non-ST elevated myocardial infarction) ([facility]) 2023    Post laminectomy syndrome     Spinal deformity 2021    cervical and lumbar    Thyroid disease     Uses wheelchair     Wears dentures     Wellness examination     Dr. MARY LOU Brasher       PAST SURGICAL HISTORY    Past Surgical History:   Procedure Laterality Date    BACK SURGERY      lower back x 3, cervical- x 1: C4- C6, 2014     BREAST SURGERY Right     mastectomy with reconstruction    CERVICAL FUSION N/A 2022    C5 CORPECTOMY, USE OF INTRAOPERATIVE CERVICAL TRACTION performed by Linda Ludwig,

## 2024-07-22 NOTE — PROGRESS NOTES
care. Pt reports HH nurses 2x/wk             Objective   O2 Device: None (Room air)    AROM RLE (degrees)  RLE AROM: WNL  AROM LLE (degrees)  LLE AROM : WNL  Strength RLE  Strength RLE: WNL  Strength LLE  Strength LLE: WFL  Comment: grossly 3+/5           Bed mobility  Supine to Sit: Stand by assistance  Sit to Supine: Stand by assistance  Bed Mobility Comments: Sup > sit HOB elevated, sit > sup HOB lowered. Use of hand rails  Transfers  Sit to Stand: Stand by assistance  Stand to Sit: Stand by assistance  Ambulation  Surface: Level tile  Device: Rolling Walker  Assistance: Stand by assistance  Distance: 84 ft     Balance  Sitting - Static: Good  Sitting - Dynamic: Good  Standing - Static: Fair;+  Standing - Dynamic: Fair;+         AM-PAC - Mobility    AM-PAC Basic Mobility - Inpatient   How much help is needed turning from your back to your side while in a flat bed without using bedrails?: None  How much help is needed moving from lying on your back to sitting on the side of a flat bed without using bedrails?: None  How much help is needed moving to and from a bed to a chair?: A Little  How much help is needed standing up from a chair using your arms?: A Little  How much help is needed walking in hospital room?: A Little  How much help is needed climbing 3-5 steps with a railing?: A Little  AM-[facility] Inpatient Mobility Raw Score : 20  AM-PAC Inpatient T-Scale Score : 47.67  Mobility Inpatient CMS 0-100% Score: 35.83  Mobility Inpatient CMS G-Code Modifier : CJ              Goals  Short Term Goals  Time Frame for Short Term Goals: 2-3 days  Short Term Goal 1: Transfers from various surfaces with supervision  Short Term Goal 2: pt able to ambulate 150ft with RW and supervision for community mobility  Short Term Goal 3: pt able to negotiate 1 step with rail and SBA for home entry  Short Term Goal 4: pt able to tolerate 20-25min of therapeutic activity/exercises for fungtional endurance              Therapy Time    Individual Concurrent Group Co-treatment   Time In 0902         Time Out 0926         Minutes 24         Timed Code Treatment Minutes: 10 Minutes       Shikha Sarah, PT

## 2024-07-22 NOTE — DISCHARGE INSTR - COC
Continuity of Care Form    Patient Name: Rajani Iglesias   :  1960  MRN:  [id number]    Admit date:  2024  Discharge date:  2024    Code Status Order: Full Code   Advance Directives:     Admitting Physician:  Param Andres MD  PCP: Giovanna Brasher MD    Discharging Nurse: Juanita FERNANDES  Discharging Hospital Unit/Room#: /-  Discharging Unit Phone Number: [phone]    Emergency Contact:   Extended Emergency Contact Information  Primary Emergency Contact: LG QUINTERO  Home Phone: [phone]  Mobile Phone: [phone]  Relation: Spouse  Preferred language: English  Secondary Emergency Contact: Mary Tejeda  Home Phone: [phone]  Relation: Child    Past Surgical History:  Past Surgical History:   Procedure Laterality Date    BACK SURGERY      lower back x 3, cervical- x 1: C4- C6, 2014     BREAST SURGERY Right     mastectomy with reconstruction    CERVICAL FUSION N/A 2022    C5 CORPECTOMY, USE OF INTRAOPERATIVE CERVICAL TRACTION performed by Linda Ludwig DO at [facility]    CERVICAL FUSION N/A 2022    POSTERIOR FIXATION C2-C7 performed by Linda Ludwig DO at [facility] OR    COLONOSCOPY  about 2018    HERNIA REPAIR Bilateral     inguinal    HYSTERECTOMY, TOTAL ABDOMINAL (CERVIX REMOVED)      LUMBAR SPINE SURGERY N/A 2021    LUMBAR LAMINECTOMY L2-S1 performed by Linda Ludwig DO at [facility] OR    MASTECTOMY, RADICAL      OTHER SURGICAL HISTORY  2022    C5 CORPECTOMY, USE OF INTRAOPERATIVE CERVICAL TRACTION (N/A Spine Cervical)     SIGMOID COLECTOMY N/A 2024    LAPAROTOMY EXPLORATORY W/COLECTOMY, ILEOSTOMY RIGHT LOWER QUADRANT performed by Saad Bustillo DO at [facility] OR       Immunization History:   Immunization History   Administered Date(s) Administered    COVID-19, MODERNA BLUE border, Primary or Immunocompromised, (age 12y+), IM, 100 mcg/0.5mL 2021, 2021, 12/15/2021    COVID-19, MODERNA Bivalent, (age 12y+), IM, 50 mcg/0.5 mL 10/12/2022     Ileostomy in place ([facility]) Z93.2    UTI (urinary tract infection) N39.0       Isolation/Infection:   Isolation            No Isolation          Patient Infection Status       None to display                     Nurse Assessment:  Last Vital Signs: BP (!) 141/65   Pulse (!) 108   Temp 98.5 °F (36.9 °C) (Oral)   Resp 16   Ht 1.55 m (5' 1.02\")   Wt 58.1 kg (128 lb)   SpO2 94%   BMI 24.17 kg/m²     Last documented pain score (0-10 scale): Pain Level: 8  Last Weight:   Wt Readings from Last 1 Encounters:   07/21/24 58.1 kg (128 lb)     Mental Status:  oriented and alert    IV Access:  - None    Nursing Mobility/ADLs:  Walking   Assisted  Transfer  Assisted  Bathing  Assisted  Dressing  Assisted  Toileting  Assisted  Feeding  Independent  Med Admin  Independent  Med Delivery   whole    Wound Care Documentation and Therapy:  Wound 07/22/24 Abdomen Lower (Active)   Wound Image   07/22/24 1310   Wound Etiology Non-Healing Surgical 07/22/24 1310   Dressing Status Old drainage noted;New dressing applied 07/22/24 1310   Wound Cleansed Cleansed with saline 07/22/24 1310   Dressing/Treatment Foam 07/22/24 1310   Wound Length (cm) 1.1 cm 07/22/24 1310   Wound Width (cm) 0.3 cm 07/22/24 1310   Wound Depth (cm) 0.1 cm 07/22/24 1310   Wound Surface Area (cm^2) 0.33 cm^2 07/22/24 1310   Wound Volume (cm^3) 0.033 cm^3 07/22/24 1310   Wound Assessment Pink/red 07/22/24 1310   Drainage Amount Small (< 25%) 07/22/24 1310   Drainage Description Serous 07/22/24 1310   Odor None 07/22/24 1310   Rosa-wound Assessment Dry/flaky;Induration 07/22/24 1310   Margins Defined edges 07/22/24 1310   Number of days: 0     Abdominal wound: Cleanse with saline, pat dry. Apply foam or dry dressing and change daily and as needed if loose or soiled.      Elimination:  Continence:   Bowel: Yes  Bladder: Yes  Urinary Catheter: None   Colostomy/Ileostomy/Ileal Conduit: Yes  Colostomy RLQ-Stomal Appliance: 1 piece, Clean, dry & intact  Colostomy

## 2024-07-22 NOTE — PROGRESS NOTES
[facility]   Occupational Therapy Evaluation  Date: 24  Patient Name: Rajani Iglesias       Room:   MRN: [id number]  Account: [id number]   : 1960  (64 y.o.) Gender: female     Discharge Recommendations:  Further Occupational Therapy is recommended upon facility discharge.    OT Equipment Recommendations  Other: TBD    Referring Practitioner: Param Andres MD    Treatment Diagnosis: Impaired self-care status    Past Medical History:  has a past medical history of Anxiety, Arthritis, At maximum risk for fall, Breast cancer (HCC), Brief psychotic disorder (HCC), Cancer (HCC), Cauda equina syndrome (HCC), Cerebrovascular accident (HCC), Cervical stenosis of spine, GERD (gastroesophageal reflux disease), History of stomach ulcers, History of therapeutic radiation, Hx antineoplastic chemo, Hypertension, Hypothyroidism, Lumbar neuritis, NSTEMI (non-ST elevated myocardial infarction) (HCC), Post laminectomy syndrome, Spinal deformity, Thyroid disease, Uses wheelchair, Wears dentures, and Wellness examination.    Past Surgical History:   has a past surgical history that includes hernia repair (Bilateral); Breast surgery (Right); Mastectomy, radical; Hysterectomy, total abdominal; Lumbar spine surgery (N/A, 2021); back surgery; Colonoscopy (about ); other surgical history (2022); cervical fusion (N/A, 2022); cervical fusion (N/A, 2022); and Sigmoid Colectomy (N/A, 2024).    Restrictions  Restrictions/Precautions  Restrictions/Precautions: General Precautions, Up as Tolerated  Required Braces or Orthoses?: No      Vitals  Vitals  O2 Device: None (Room air)     Subjective     Subjective  Pain: 7/10 pain in abdomen/over incision    Social/Functional History  Social/Functional History  Lives With: Significant other  Type of Home: House  Home Layout: Two level, Able to Live on Main level with bedroom/bathroom  Home Access: Stairs to enter without  Device: Rolling Walker  Activity: Other (In room, into hallway)  Assist Level: Stand by assistance  Functional Mobility Comments: SBA for safety, no LOB noted. Slow pacing and increased time required throughout    Assessment  Assessment  Performance deficits / Impairments: Decreased functional mobility , Decreased ADL status, Decreased ROM, Decreased strength, Decreased safe awareness, Decreased cognition, Decreased endurance, Decreased balance, Decreased sensation, Decreased high-level IADLs, Decreased fine motor control, Decreased coordination, Decreased posture  Treatment Diagnosis: Impaired self-care status  Prognosis: Good  Decision Making: Medium Complexity  Discharge Recommendations: Patient would benefit from continued therapy after discharge    Activity Tolerance  Activity Tolerance: Patient limited by fatigue    Safety Devices  Type of Devices: All fall risk precautions in place, Bed alarm in place, Call light within reach, Gait belt, Patient at risk for falls, Left in bed, Nurse notified    Patient Education  Patient Education  Education Given To: Patient  Education Provided: Role of Therapy, Plan of Care, Transfer Training  Education Method: Verbal, Demonstration  Barriers to Learning: Cognition  Education Outcome: Verbalized understanding, Continued education needed    Functional Outcome Measures  AM-PAC Daily Activity - Inpatient   How much help is needed for putting on and taking off regular lower body clothing?: A Little  How much help is needed for bathing (which includes washing, rinsing, drying)?: A Little  How much help is needed for toileting (which includes using toilet, bedpan, or urinal)?: A Little  How much help is needed for putting on and taking off regular upper body clothing?: A Little  How much help is needed for taking care of personal grooming?: A Little  How much help for eating meals?: A Little  AM-[facility] Inpatient Daily Activity Raw Score: 18  AM-PAC Inpatient ADL T-Scale Score :

## 2024-07-22 NOTE — PROGRESS NOTES
Saad Bustillo DO   Multiple Vitamins-Minerals (THERAPEUTIC MULTIVITAMIN-MINERALS) tablet Take 1 tablet by mouth daily  Patient not taking: Reported on 7/9/2024 7/7/24   Saad Bustillo DO   ondansetron (ZOFRAN-ODT) 4 MG disintegrating tablet Take 1 tablet by mouth every 8 hours as needed for Nausea or Vomiting  Patient not taking: Reported on 7/9/2024 7/6/24 8/5/24  Saad Bustillo DO   tamsulosin (FLOMAX) 0.4 MG capsule Take 1 capsule by mouth daily  Patient not taking: Reported on 7/9/2024 7/7/24   Saad Bustillo DO   pantoprazole (PROTONIX) 40 MG tablet Take 1 tablet by mouth daily 6/20/24   Frankie Sosa DO   dilTIAZem (CARDIZEM CD) 240 MG extended release capsule TAKE 1 CAPSULE BY MOUTH DAILY 6/18/24 10/16/24  Frankie Sosa DO   atorvastatin (LIPITOR) 40 MG tablet TAKE ONE TABLET BY MOUTH TWICE A WEEK 6/18/24   Frankie Sosa DO   folic acid (FOLVITE) 1 MG tablet TAKE 1 TABLET BY MOUTH DAILY 6/18/24   Frankie Sosa DO   QUEtiapine (SEROQUEL) 25 MG tablet TAKE ONE TABLET BY MOUTH ONCE NIGHTLY 5/28/24   Kentrell Wood MD   levothyroxine (SYNTHROID) 100 MCG tablet TAKE 1 TABLET BY MOUTH DAILY 5/14/24   Bhavesh Bryan PA   lisinopril (PRINIVIL;ZESTRIL) 5 MG tablet TAKE 1 TABLET BY MOUTH DAILY 5/8/24   Giovanna Brasher MD   busPIRone (BUSPAR) 30 MG tablet Take 30 mg by mouth 2 times daily 2/29/24   Giovanna Brasher MD   HYDROcodone-acetaminophen (NORCO) 5-325 MG per tablet 1 tablet 2 times daily. 1/26/24   ProviderRobert MD   zoledronic acid (RECLAST) 5 MG/100ML SOLN Infuse 100 mLs intravenously once for 1 dose 2/16/24 6/20/25  Giovanna Brasher MD   vitamin D3 (CHOLECALCIFEROL) 25 MCG (1000 UT) TABS tablet Take 2 tablets by mouth daily 2/16/24   Giovanna Brasher MD   acetaminophen (TYLENOL) 325 MG tablet Take 2 tablets by mouth every 6 hours as needed for Pain 1/1/24   Pilar Ring MD   aspirin EC 81 MG EC tablet Take 1 tablet by mouth daily 1/1/24   Jhonathan  07/22/24  5:44 AM   Result Value Ref Range    Sodium 140 135 - 144 mmol/L    Potassium 3.3 (L) 3.7 - 5.3 mmol/L    Chloride 103 98 - 107 mmol/L    CO2 26 20 - 31 mmol/L    Anion Gap 11 9 - 17 mmol/L    Glucose 78 70 - 99 mg/dL    BUN 7 (L) 8 - 23 mg/dL    Creatinine 0.6 0.5 - 0.9 mg/dL    Est, Glom Filt Rate >90 >60 mL/min/1.73m2    Calcium 7.8 (L) 8.6 - 10.4 mg/dL   CBC with Auto Differential    Collection Time: 07/22/24  5:44 AM   Result Value Ref Range    WBC 3.1 (L) 3.5 - 11.0 k/uL    RBC 2.29 (L) 4.0 - 5.2 m/uL    Hemoglobin 8.0 (L) 12.0 - 16.0 g/dL    Hematocrit 23.7 (L) 36 - 46 %    .6 (H) 80 - 100 fL    MCH 34.9 (H) 26 - 34 pg    MCHC 33.7 31 - 37 g/dL    RDW 14.6 11.5 - 14.9 %    Platelets 186 150 - 450 k/uL    MPV 7.1 6.0 - 12.0 fL    Neutrophils % 56 36 - 66 %    Lymphocytes % 30 24 - 44 %    Monocytes % 11 (H) 1 - 7 %    Eosinophils % 2 0 - 4 %    Basophils % 1 0 - 2 %    Neutrophils Absolute 1.74 1.3 - 9.1 k/uL    Lymphocytes Absolute 0.93 (L) 1.0 - 4.8 k/uL    Monocytes Absolute 0.34 0.1 - 1.3 k/uL    Eosinophils Absolute 0.06 0.0 - 0.4 k/uL    Basophils Absolute 0.03 0.0 - 0.2 k/uL    Morphology ANISOCYTOSIS PRESENT    Magnesium    Collection Time: 07/22/24  5:44 AM   Result Value Ref Range    Magnesium 0.9 (LL) 1.6 - 2.6 mg/dL   Magnesium    Collection Time: 07/22/24 12:54 PM   Result Value Ref Range    Magnesium 2.3 1.6 - 2.6 mg/dL       Imaging/Diagnostics:      Assessment :      Primary Problem  UTI (urinary tract infection)    Active Hospital Problems    Diagnosis Date Noted    UTI (urinary tract infection) [N39.0] 07/21/2024       Plan:     Patient status Admit as inpatient in the  Progressive Unit/Step down    UTI, patient started on Zosyn, urine culture pending  Concern for infection at the site of sutures, CT abdomen pelvis is negative, general surgery presented from emergency room, will continue with Zosyn, wound culture at the local site  Hypothyroidism, resuming home dose Synthroid,

## 2024-07-22 NOTE — CONSULTS
General Surgery  Consultation        PATIENT NAME: Rajani Iglesias   YOB: 1960    ADMISSION DATE: 7/21/2024 12:05 PM     Consulting Physician: Dr Bustillo     TODAY'S DATE: 7/22/2024    Reason for consult:  Altered mental status    Chief complaint: same    HISTORY OF PRESENT ILLNESS:  The patient is a 64 y.o. female  who presented to the [facility] with altered mental status after recent rt colectomy & ileostomy for colonic obstruction. Pt found to have significant urinary tract infection with good stoma output and no N/V.    Past Medical History:        Diagnosis Date    Anxiety     Arthritis     At maximum risk for fall 12/29/2021    caudal equina syndrome and cervical stenosis    Breast cancer ([facility])     Brief psychotic disorder ([facility]) 01/05/2024    Cancer ([facility])     right breast    Cauda equina syndrome ([facility]) 12/29/2021    neuropathy, mobility difficulty, incontinance    Cerebrovascular accident ([facility]) 01/01/2024    Cervical stenosis of spine 12/29/2021    GERD (gastroesophageal reflux disease)     History of stomach ulcers     History of therapeutic radiation     Hx antineoplastic chemo     Hypertension     Dr. MARY LOU Brasher    Hypothyroidism     Lumbar neuritis     NSTEMI (non-ST elevated myocardial infarction) ([facility]) 12/13/2023    Post laminectomy syndrome     Spinal deformity 11/2021    cervical and lumbar    Thyroid disease     Uses wheelchair     Wears dentures     Wellness examination     Dr. MARY LOU Brasher       Past Surgical History:        Procedure Laterality Date    BACK SURGERY      lower back x 3, cervical- x 1: C4- C6, 11/4/2014     BREAST SURGERY Right     mastectomy with reconstruction    CERVICAL FUSION N/A 4/12/2022    C5 CORPECTOMY, USE OF INTRAOPERATIVE CERVICAL TRACTION performed by Linda Ludwig DO at [facility] OR    CERVICAL FUSION N/A 4/12/2022    POSTERIOR FIXATION C2-C7 performed by Linda Ludwig DO at [facility] OR    COLONOSCOPY  about 2018    HERNIA REPAIR Bilateral     inguinal     CHEST PORTABLE   Final Result   Chronic appearing opacity in the right lung base, corresponding to described   scarring on prior abdominal CT imaging.  No new airspace disease appreciated.               ASSESSMENT   Altered mental status likely due to urosepsis resolving    PLAN    Reg diet, stoma care   Home on abics per primary service        Electronically signed by Saad Bustillo DO  on 7/22/2024 at 7:55 AM

## 2024-07-22 NOTE — PROGRESS NOTES
Called patients pharmacy to confirm dose of methadone. The pharmacy stated that the patient takes 10 mg of methadone BID. Spoke to Savanah Garay NP.

## 2024-07-23 ENCOUNTER — CARE COORDINATION (OUTPATIENT)
Dept: CARE COORDINATION | Age: 64
End: 2024-07-23

## 2024-07-23 VITALS
TEMPERATURE: 98.1 F | BODY MASS INDEX: 24.17 KG/M2 | SYSTOLIC BLOOD PRESSURE: 131 MMHG | HEIGHT: 61 IN | OXYGEN SATURATION: 96 % | WEIGHT: 128 LBS | HEART RATE: 99 BPM | RESPIRATION RATE: 20 BRPM | DIASTOLIC BLOOD PRESSURE: 79 MMHG

## 2024-07-23 LAB
EKG ATRIAL RATE: 84 BPM
EKG P AXIS: 42 DEGREES
EKG P-R INTERVAL: 130 MS
EKG Q-T INTERVAL: 410 MS
EKG QRS DURATION: 90 MS
EKG QTC CALCULATION (BAZETT): 484 MS
EKG R AXIS: 36 DEGREES
EKG T AXIS: 76 DEGREES
EKG VENTRICULAR RATE: 84 BPM
ERYTHROCYTE [DISTWIDTH] IN BLOOD BY AUTOMATED COUNT: 14.8 % (ref 11.5–14.9)
HCT VFR BLD AUTO: 28.8 % (ref 36–46)
HGB BLD-MCNC: 9.5 G/DL (ref 12–16)
MCH RBC QN AUTO: 34.9 PG (ref 26–34)
MCHC RBC AUTO-ENTMCNC: 33 G/DL (ref 31–37)
MCV RBC AUTO: 105.7 FL (ref 80–100)
MICROORGANISM SPEC CULT: ABNORMAL
MICROORGANISM SPEC CULT: NO GROWTH
PLATELET # BLD AUTO: 208 K/UL (ref 150–450)
PMV BLD AUTO: 6.9 FL (ref 6–12)
RBC # BLD AUTO: 2.73 M/UL (ref 4–5.2)
SERVICE CMNT-IMP: ABNORMAL
SERVICE CMNT-IMP: NORMAL
SPECIMEN DESCRIPTION: ABNORMAL
SPECIMEN DESCRIPTION: NORMAL
WBC OTHER # BLD: 4.4 K/UL (ref 3.5–11)

## 2024-07-23 PROCEDURE — 6370000000 HC RX 637 (ALT 250 FOR IP): Performed by: INTERNAL MEDICINE

## 2024-07-23 PROCEDURE — 6360000002 HC RX W HCPCS: Performed by: INTERNAL MEDICINE

## 2024-07-23 PROCEDURE — 93010 ELECTROCARDIOGRAM REPORT: CPT | Performed by: INTERNAL MEDICINE

## 2024-07-23 PROCEDURE — 97535 SELF CARE MNGMENT TRAINING: CPT

## 2024-07-23 PROCEDURE — 2580000003 HC RX 258: Performed by: EMERGENCY MEDICINE

## 2024-07-23 PROCEDURE — 99239 HOSP IP/OBS DSCHRG MGMT >30: CPT | Performed by: INTERNAL MEDICINE

## 2024-07-23 PROCEDURE — 36415 COLL VENOUS BLD VENIPUNCTURE: CPT

## 2024-07-23 PROCEDURE — 97110 THERAPEUTIC EXERCISES: CPT

## 2024-07-23 PROCEDURE — 2580000003 HC RX 258: Performed by: INTERNAL MEDICINE

## 2024-07-23 PROCEDURE — 97116 GAIT TRAINING THERAPY: CPT

## 2024-07-23 PROCEDURE — 6370000000 HC RX 637 (ALT 250 FOR IP): Performed by: NURSE PRACTITIONER

## 2024-07-23 PROCEDURE — 85027 COMPLETE CBC AUTOMATED: CPT

## 2024-07-23 RX ORDER — FLUTICASONE PROPIONATE 50 MCG
1 SPRAY, SUSPENSION (ML) NASAL 2 TIMES DAILY PRN
Status: DISCONTINUED | OUTPATIENT
Start: 2024-07-23 | End: 2024-07-23 | Stop reason: HOSPADM

## 2024-07-23 RX ORDER — CEPHALEXIN 500 MG/1
500 CAPSULE ORAL 2 TIMES DAILY
Qty: 14 CAPSULE | Refills: 0 | Status: SHIPPED | OUTPATIENT
Start: 2024-07-23 | End: 2024-07-30

## 2024-07-23 RX ADMIN — DILTIAZEM HYDROCHLORIDE 240 MG: 240 CAPSULE, EXTENDED RELEASE ORAL at 09:08

## 2024-07-23 RX ADMIN — ACETAMINOPHEN 650 MG: 325 TABLET ORAL at 03:45

## 2024-07-23 RX ADMIN — PIPERACILLIN AND TAZOBACTAM 3375 MG: 3; .375 INJECTION, POWDER, LYOPHILIZED, FOR SOLUTION INTRAVENOUS at 02:36

## 2024-07-23 RX ADMIN — LISINOPRIL 5 MG: 5 TABLET ORAL at 09:10

## 2024-07-23 RX ADMIN — SODIUM CHLORIDE, PRESERVATIVE FREE 10 ML: 5 INJECTION INTRAVENOUS at 09:11

## 2024-07-23 RX ADMIN — FOLIC ACID 1 MG: 1 TABLET ORAL at 09:10

## 2024-07-23 RX ADMIN — SODIUM CHLORIDE: 9 INJECTION, SOLUTION INTRAVENOUS at 00:50

## 2024-07-23 RX ADMIN — Medication 2000 UNITS: at 09:08

## 2024-07-23 RX ADMIN — HYDROCODONE BITARTRATE AND ACETAMINOPHEN 1 TABLET: 5; 325 TABLET ORAL at 09:08

## 2024-07-23 RX ADMIN — LEVOTHYROXINE SODIUM 100 MCG: 0.1 TABLET ORAL at 09:08

## 2024-07-23 RX ADMIN — ENOXAPARIN SODIUM 40 MG: 100 INJECTION SUBCUTANEOUS at 09:10

## 2024-07-23 RX ADMIN — METHADONE HYDROCHLORIDE 10 MG: 10 TABLET ORAL at 09:10

## 2024-07-23 RX ADMIN — ASPIRIN 81 MG: 81 TABLET, COATED ORAL at 09:08

## 2024-07-23 RX ADMIN — DOCUSATE SODIUM 100 MG: 100 CAPSULE, LIQUID FILLED ORAL at 09:08

## 2024-07-23 RX ADMIN — PIPERACILLIN AND TAZOBACTAM 3375 MG: 3; .375 INJECTION, POWDER, LYOPHILIZED, FOR SOLUTION INTRAVENOUS at 11:44

## 2024-07-23 RX ADMIN — SODIUM CHLORIDE 75 ML: 9 INJECTION, SOLUTION INTRAVENOUS at 02:35

## 2024-07-23 RX ADMIN — PANTOPRAZOLE SODIUM 40 MG: 40 TABLET, DELAYED RELEASE ORAL at 09:10

## 2024-07-23 RX ADMIN — ATORVASTATIN CALCIUM 40 MG: 40 TABLET, FILM COATED ORAL at 09:10

## 2024-07-23 ASSESSMENT — PAIN DESCRIPTION - LOCATION
LOCATION: BACK
LOCATION: HEAD

## 2024-07-23 ASSESSMENT — PAIN DESCRIPTION - ORIENTATION: ORIENTATION: LOWER

## 2024-07-23 ASSESSMENT — PAIN DESCRIPTION - DESCRIPTORS
DESCRIPTORS: ACHING
DESCRIPTORS: ACHING

## 2024-07-23 ASSESSMENT — PAIN SCALES - GENERAL
PAINLEVEL_OUTOF10: 3
PAINLEVEL_OUTOF10: 6
PAINLEVEL_OUTOF10: 1

## 2024-07-23 ASSESSMENT — PAIN - FUNCTIONAL ASSESSMENT: PAIN_FUNCTIONAL_ASSESSMENT: PREVENTS OR INTERFERES SOME ACTIVE ACTIVITIES AND ADLS

## 2024-07-23 NOTE — DISCHARGE SUMMARY
Unremarkable for patient's age. Spleen: Unremarkable Kidneys and ureters: Unremarkable Adrenal glands: Unremarkable Retroperitoneal structures:  Unremarkable Small bowel and colon: Severely distended cecum with a moderate to large amount of stool seen throughout the ascending and proximal transverse colon with transition point seen in the left lower quadrant on image 109 involving the distal redundant transverse colon which is thickened.  The colon distal to this is decompressed consistent with a large bowel obstruction. Underlying stricture/malignancy difficult to exclude.  Sigmoid diverticulosis. Appendix: Not seen Urinary bladder: Unremarkable Free fluid/air: Small amount of free fluid Lymph nodes: No enlarged lymph nodes Osseus structures: No destructive lesion Vasculature: No aneurysm Other: The uterus is surgically absent.  2 cm left adnexal cyst is simple in nature and unchanged.  Recommend yearly follow-up.     1. Large bowel obstruction with transition point seen in the redundant transverse colon seen in the left lower quadrant.  Underlying stricture malignancy difficult to exclude. 2. Distended gallbladder with a small amount of perihepatic ascites. Recommend clinical correlation for possibility of cholecystitis.         Consultations:    Consults:     Final Specialist Recommendations/Findings:   IP CONSULT TO GENERAL SURGERY      The patient was seen and examined on day of discharge and this discharge summary is in conjunction with any daily progress note from day of discharge.    Discharge plan:     Disposition: ***      Instructions to Patient: Keep follow-up appointments      Requiring Further Evaluation/Follow Up POST HOSPITALIZATION/Incidental Findings:     Physician Follow Up:     No follow-up provider specified.     Activity: Resume as directed    Discharge Medications:      Medication List        CONTINUE taking these medications      acetaminophen 325 MG tablet  Commonly known as: TYLENOL  Take 2  seen in the redundant transverse colon seen in the left lower quadrant.  Underlying stricture malignancy difficult to exclude. 2. Distended gallbladder with a small amount of perihepatic ascites. Recommend clinical correlation for possibility of cholecystitis.         Consultations:    Consults:     Final Specialist Recommendations/Findings:   IP CONSULT TO GENERAL SURGERY      The patient was seen and examined on day of discharge and this discharge summary is in conjunction with any daily progress note from day of discharge.    Discharge plan:     Disposition: Home      Instructions to Patient: Keep follow-up appointments      Requiring Further Evaluation/Follow Up POST HOSPITALIZATION/Incidental Findings:     Physician Follow Up:     No follow-up provider specified.     Activity: Resume as directed    Discharge Medications:      Medication List        START taking these medications      cephALEXin 500 MG capsule  Commonly known as: KEFLEX  Take 1 capsule by mouth 2 times daily for 7 days            CONTINUE taking these medications      acetaminophen 325 MG tablet  Commonly known as: TYLENOL  Take 2 tablets by mouth every 6 hours as needed for Pain     aspirin EC 81 MG EC tablet  Take 1 tablet by mouth daily     atorvastatin 40 MG tablet  Commonly known as: LIPITOR  TAKE ONE TABLET BY MOUTH TWICE A WEEK     busPIRone 30 MG tablet  Commonly known as: BUSPAR  Take 30 mg by mouth 2 times daily     dilTIAZem 240 MG extended release capsule  Commonly known as: CARDIZEM CD  TAKE 1 CAPSULE BY MOUTH DAILY     docusate 100 MG Caps  Commonly known as: COLACE, DULCOLAX  Take 100 mg by mouth 2 times daily     folic acid 1 MG tablet  Commonly known as: FOLVITE  TAKE 1 TABLET BY MOUTH DAILY     Handicap Placard Misc  by Does not apply route Cannot walk 200 feet due to medical issues  Expires 7/14/2027     HYDROcodone-acetaminophen 5-325 MG per tablet  Commonly known as: NORCO     lisinopril 5 MG tablet  Commonly known as:

## 2024-07-23 NOTE — PROGRESS NOTES
[facility]   INPATIENT OCCUPATIONAL THERAPY  PROGRESS NOTE  Date: 2024  Patient Name: Rajani Iglesias       Room:   MRN: [id number]    : 1960  (64 y.o.)  Gender: female   Referring Practitioner: Param Andres MD         Discharge Recommendations:  Further Occupational Therapy is recommended upon facility discharge.    OT Equipment Recommendations  Other: TBD    Restrictions/Precautions  Restrictions/Precautions  Restrictions/Precautions: General Precautions;Up as Tolerated;Fall Risk  Required Braces or Orthoses?: No    O2 Device: None (Room air)    Subjective  Subjective  Subjective: \"I have to pee! Oh no nevermind its too late.\" pt notes she has had little to no warning when she has to urinate lately  Pain: no c/o pain       Objective  Cognition  Overall Cognitive Status: Exceptions  Arousal/Alertness: Appears intact  Following Commands: Follows multistep commands with increased time;Follows multistep commands with repitition  Attention Span: Appears intact  Safety Judgement: Decreased awareness of need for safety;Decreased awareness of need for assistance  Problem Solving: Assistance required to generate solutions;Assistance required to implement solutions  Insights: Decreased awareness of deficits  Initiation: Requires cues for some  Sequencing: Requires cues for some    Activities of Daily Living  ADL  Feeding: Setup  UE Bathing: Supervision  UE Bathing Skilled Clinical Factors: pt seated on toilet requiring setup assist to wash UN after and episode of incontinence  LE Bathing: Stand by assistance  LE Bathing Skilled Clinical Factors: SBA while standing to complete juanita care pt seated on toilet to wash BLE. setup assist was provided  UE Dressing: Supervision  UE Dressing Skilled Clinical Factors: completed seate don toilet this date  LE Dressing: Stand by assistance  LE Dressing Skilled Clinical Factors: SBA during management over hips.  Toileting:  urinal)?: A Little  How much help is needed for putting on and taking off regular upper body clothing?: A Little  How much help is needed for taking care of personal grooming?: A Little  How much help for eating meals?: A Little  AM-PAC Inpatient Daily Activity Raw Score: 18  AM-PAC Inpatient ADL T-Scale Score : 38.66  ADL Inpatient CMS 0-100% Score: 46.65  ADL Inpatient CMS G-Code Modifier : CK    OT Minutes  OT Individual Minutes  Time In: 1004  Time Out: 1042  Minutes: 38      Electronically signed by GOOD Dacosta on 7/23/24 at 1:31 PM EDT

## 2024-07-23 NOTE — CARE COORDINATION
Patient noted readmitted to [facility] 7/21/24. Closing for current CARLOS ENRIQUE episode.

## 2024-07-23 NOTE — PROGRESS NOTES
CLINICAL PHARMACY NOTE: MEDS TO BEDS    Total # of Prescriptions Filled: 1   The following medications were delivered to the patient:  Cephalexin 500mg    Additional Documentation: 7/23/24 kbg 12:14pm delivered to pt in room [employer] Nurse Juanita stockton

## 2024-07-23 NOTE — PROGRESS NOTES
General Surgery Progress Note    Subjective:     Patient without complaints. No nausea or vomiting. Voiding well.  Tolerating diet.  Stoma appliance maintenance still problematic with leakage on incision.  Patient GCS 15    Scheduled Meds:   methadone  10 mg Oral BID    sodium chloride flush  5-40 mL IntraVENous 2 times per day    sodium chloride flush  5-40 mL IntraVENous 2 times per day    enoxaparin  40 mg SubCUTAneous Daily    piperacillin-tazobactam  3,375 mg IntraVENous Q8H    aspirin EC  81 mg Oral Daily    atorvastatin  40 mg Oral Daily    busPIRone  30 mg Oral BID    dilTIAZem  240 mg Oral Daily    docusate sodium  100 mg Oral BID    folic acid  1 mg Oral Daily    HYDROcodone-acetaminophen  1 tablet Oral BID    levothyroxine  100 mcg Oral Daily    lisinopril  5 mg Oral Daily    pantoprazole  40 mg Oral Daily    QUEtiapine  25 mg Oral Nightly    Vitamin D  2,000 Units Oral Daily     Continuous Infusions:   sodium chloride      sodium chloride 75 mL (07/23/24 0235)    sodium chloride 100 mL/hr at 07/23/24 0050     PRN Meds:fluticasone, sodium chloride flush, sodium chloride, sodium chloride flush, sodium chloride flush, sodium chloride, potassium chloride **OR** potassium alternative oral replacement **OR** potassium chloride, magnesium sulfate, ondansetron **OR** ondansetron, acetaminophen **OR** acetaminophen    Objective:   BP (!) 136/93   Pulse 100   Temp 97.8 °F (36.6 °C)   Resp 20   Ht 1.55 m (5' 1.02\")   Wt 58.1 kg (128 lb)   SpO2 98%   BMI 24.17 kg/m²     I/O last 3 completed shifts:  In: 1136.4 [I.V.:849.1; IV Piggyback:287.3]  Out: 1195 [Urine:770; Stool:425]    Chest: Breath sounds were clear and equal with no rales, wheezes, or rhonchi.  Respiratory effort was normal with no retractions or use of accessory muscles.    Cardiovascular: Heart sounds were normal with a regular rate and rhythm.  There were no murmurs or gallops.    Abdomen:  Bowel sounds were normal.  The abdomen was soft and

## 2024-07-23 NOTE — PROGRESS NOTES
Patient educated on discharge instructions which include: medication schedule, reasons for new medication and some side effects and need to follow-up. Patient given new prescriptions during teaching.  Patient verbalizes understanding of discharge and states readiness for discharge.  All belongings were gathered by patient and in patient's possession.  No distress noted at this time.     Current vital signs:  /79   Pulse 99   Temp 98.1 °F (36.7 °C) (Oral)   Resp 20   Ht 1.55 m (5' 1.02\")   Wt 58.1 kg (128 lb)   SpO2 96%   BMI 24.17 kg/m²                MEWS Score: 4

## 2024-07-23 NOTE — PLAN OF CARE
Problem: Discharge Planning  Goal: Discharge to home or other facility with appropriate resources  7/23/2024 0256 by Brook Lara RN  Outcome: Progressing     Problem: Safety - Adult  Goal: Free from fall injury  7/23/2024 0256 by Brook Lara RN  Outcome: Progressing     Problem: Pain  Goal: Verbalizes/displays adequate comfort level or baseline comfort level  7/23/2024 0256 by Brook Lara RN  Outcome: Progressing     Problem: Musculoskeletal - Adult  Goal: Return mobility to safest level of function  7/23/2024 0256 by Brook Lara RN  Outcome: Progressing     Problem: Skin/Tissue Integrity - Adult  Goal: Incisions, wounds, or drain sites healing without S/S of infection  7/23/2024 0256 by Brook Lara RN  Outcome: Progressing     Problem: Genitourinary - Adult  Goal: Absence of urinary retention  7/23/2024 0256 by Brook Lara RN  Outcome: Progressing     
  Problem: Discharge Planning  Goal: Discharge to home or other facility with appropriate resources  Outcome: Progressing  Flowsheets  Taken 7/22/2024 0302 by Brook Lara RN  Discharge to home or other facility with appropriate resources: Identify barriers to discharge with patient and caregiver  Taken 7/21/2024 1832 by Julieta Frey RN  Discharge to home or other facility with appropriate resources:   Identify barriers to discharge with patient and caregiver   Identify discharge learning needs (meds, wound care, etc)   Refer to discharge planning if patient needs post-hospital services based on physician order or complex needs related to functional status, cognitive ability or social support system   Arrange for needed discharge resources and transportation as appropriate     Problem: Safety - Adult  Goal: Free from fall injury  Outcome: Progressing  Note: No falls noted this shift. Patient ambulates with x1 staff assistance without difficulty.  Bed kept in low position. Safe environment maintained. Bedside table & call light in reach. Uses call light appropriately when needing assistance.      Problem: Pain  Goal: Verbalizes/displays adequate comfort level or baseline comfort level  Outcome: Progressing  Note: Pt medicated with pain medication prn.  Assessed all pain characteristics including level, type, location, frequency, and onset.  Non-pharmacologic interventions offered to pt as well.  Pt states pain is tolerable at this time.      Problem: Neurosensory - Adult  Goal: Achieves stable or improved neurological status  Outcome: Progressing  Flowsheets (Taken 7/22/2024 0302)  Achieves stable or improved neurological status: Assess for and report changes in neurological status     Problem: Skin/Tissue Integrity - Adult  Goal: Incisions, wounds, or drain sites healing without S/S of infection  Outcome: Progressing  Flowsheets (Taken 7/22/2024 0302)  Incisions, Wounds, or Drain Sites Healing Without Sign 
  Problem: Safety - Adult  Goal: Free from fall injury  7/23/2024 1453 by Juanita Tucker RN  Outcome: Progressing     Problem: Discharge Planning  Goal: Discharge to home or other facility with appropriate resources  7/23/2024 1453 by Juanita Tucker RN  Outcome: Progressing     Problem: Pain  Goal: Verbalizes/displays adequate comfort level or baseline comfort level  7/23/2024 1453 by Juanita Tucker RN  Outcome: Progressing     Problem: Neurosensory - Adult  Goal: Achieves stable or improved neurological status  7/23/2024 1453 by Juanita Tucker RN  Outcome: Progressing     Problem: Skin/Tissue Integrity - Adult  Goal: Incisions, wounds, or drain sites healing without S/S of infection  7/23/2024 1453 by Juanita Tucker RN  Outcome: Progressing     
infection  Outcome: Progressing  Flowsheets (Taken 7/22/2024 1732)  Incisions, Wounds, or Drain Sites Healing Without Sign and Symptoms of Infection: Implement wound care per orders  Note: The patient was seen by wound care today and orders were placed for incision wound.      Problem: Musculoskeletal - Adult  Goal: Return mobility to safest level of function  Outcome: Progressing  Flowsheets (Taken 7/22/2024 1732)  Return Mobility to Safest Level of Function:   Assess patient stability and activity tolerance for standing, transferring and ambulating with or without assistive devices   Assist with transfers and ambulation using safe patient handling equipment as needed     Problem: Gastrointestinal - Adult  Goal: Maintains or returns to baseline bowel function  Outcome: Progressing  Flowsheets (Taken 7/22/2024 1732)  Maintains or returns to baseline bowel function: Assess bowel function  Note: The patients colostomy was monitored throughout the shift.      Problem: Gastrointestinal - Adult  Goal: Establish and maintain optimal ostomy function  Outcome: Progressing     Problem: Genitourinary - Adult  Goal: Absence of urinary retention  Outcome: Progressing     Problem: Infection - Adult  Goal: Absence of infection at discharge  Outcome: Progressing     Problem: Chronic Conditions and Co-morbidities  Goal: Patient's chronic conditions and co-morbidity symptoms are monitored and maintained or improved  Outcome: Progressing     Problem: Skin/Tissue Integrity  Goal: Absence of new skin breakdown  Description: 1.  Monitor for areas of redness and/or skin breakdown  2.  Assess vascular access sites hourly  3.  Every 4-6 hours minimum:  Change oxygen saturation probe site  4.  Every 4-6 hours:  If on nasal continuous positive airway pressure, respiratory therapy assess nares and determine need for appliance change or resting period.  Outcome: Progressing     Problem: ABCDS Injury Assessment  Goal: Absence of physical

## 2024-07-23 NOTE — PROGRESS NOTES
Physical Therapy  Facility/Department: [facility] PROGRESSIVE CARE  Daily Treatment Note  NAME: Rajani Iglesias  : 1960  MRN: [id number]    Date of Service: 2024    Discharge Recommendations:  Home with assist PRN        Patient Diagnosis(es): The primary encounter diagnosis was Acute cystitis without hematuria. 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    Assessment   Activity Tolerance: Patient tolerated treatment well     Plan    Physical Therapy Plan  General Plan: 5-7 times per week  Current Treatment Recommendations: Functional mobility training;Endurance training;Gait training;Stair training;Therapeutic activities     Restrictions  Restrictions/Precautions  Restrictions/Precautions: General Precautions, Up as Tolerated, Fall Risk  Required Braces or Orthoses?: No     Subjective    Subjective  Subjective: Pt sitting up in bedside chair on arrival, pleasant and cooperative with therapy.  Pain: 6/10 pain in abdomen at incision site  Cognition  Overall Cognitive Status: Exceptions  Arousal/Alertness: Appears intact  Following Commands: Follows multistep commands with increased time;Follows multistep commands with repitition  Attention Span: Appears intact  Safety Judgement: Decreased awareness of need for safety;Decreased awareness of need for assistance  Problem Solving: Assistance required to generate solutions;Assistance required to implement solutions  Insights: Decreased awareness of deficits  Initiation: Requires cues for some  Sequencing: Requires cues for some     Objective   Vitals     Bed Mobility Training  Bed Mobility Training: No (Pt was up in bedside chair to begin session and left sitting up in chair at end of session)  Balance  Sitting: With support;Intact (back supported sitting in bedside chair)  Standing: With support (SBA with RW)  Transfer Training  Transfer Training:

## 2024-07-23 NOTE — CARE COORDINATION
Patient readmitted 7/21/24 to [facility]. [facility] closing previous CARLOS ENRIQUE episode. Will monitor for DC and proceed appropriately.

## 2024-07-24 ENCOUNTER — CARE COORDINATION (OUTPATIENT)
Dept: CARE COORDINATION | Age: 64
End: 2024-07-24

## 2024-07-24 DIAGNOSIS — F32.9 REACTIVE DEPRESSION: ICD-10-CM

## 2024-07-24 DIAGNOSIS — F51.01 PRIMARY INSOMNIA: ICD-10-CM

## 2024-07-24 RX ORDER — LEVOTHYROXINE SODIUM 0.1 MG/1
100 TABLET ORAL DAILY
Qty: 90 TABLET | Refills: 0 | Status: SHIPPED | OUTPATIENT
Start: 2024-07-24

## 2024-07-24 RX ORDER — QUETIAPINE FUMARATE 25 MG/1
25 TABLET, FILM COATED ORAL NIGHTLY
Qty: 30 TABLET | Refills: 1 | Status: SHIPPED | OUTPATIENT
Start: 2024-07-24

## 2024-07-24 NOTE — CARE COORDINATION
Care Transitions Note    Initial Call - Call within 2 business days of discharge: Yes    Patient Current Location:  Home: [address] [address]    Care Transition Nurse contacted the patient by telephone to perform post hospital discharge assessment, verified name and  as identifiers. Provided introduction to self, and explanation of the Care Transition Nurse role.     Patient: Rajani Iglesias    Patient : 1960   MRN: [id number]    Reason for Admission: UTI  Discharge Date: 24  RURS: Readmission Risk Score: 22.8      Last Discharge Facility       Date Complaint Diagnosis Description Type Department Provider    24 Wound Infection Acute cystitis without hematuria ... ED to Hosp-Admission (Discharged) (ADMITTED) Param Randle MD; Gerardo Griffiths...            Was this an external facility discharge? No    Additional needs identified to be addressed with provider   High priority: Patient states has stopped Buspar due to ringing in ears. Also, unable to schedule HFU appt due to none available with Dr. Brasher. Please contact patient to schedule. Thank you             Method of communication with provider: chart routing.    Patients top risk factors for readmission: medical condition-complex medical conditions, recent readmit    Interventions to address risk factors:   Education: finish antibiotics as directed  Review of patient management of conditions/medications: s/s UTI    Care Summary Note: Patient reached for CARLOS ENRIQUE call. Reviewed readmit for UTI, confirms has AVS and new Keflex. Education on importance of taking full script as prescribed. States she is doing good. Has not had contact from [facility] to schedule resumption of care. [facility] will call to notify of DC. Medications reviewed, patient states considering weaning from opioids, educated this should be medically supervised by pain mgt MD and do not stop without speaking to MD. Reviewed HFU appt for patient PCP, none available, patient

## 2024-07-26 LAB
MICROORGANISM SPEC CULT: NORMAL
MICROORGANISM SPEC CULT: NORMAL
SERVICE CMNT-IMP: NORMAL
SERVICE CMNT-IMP: NORMAL
SPECIMEN DESCRIPTION: NORMAL
SPECIMEN DESCRIPTION: NORMAL

## 2024-07-30 ENCOUNTER — CARE COORDINATION (OUTPATIENT)
Dept: CARE COORDINATION | Age: 64
End: 2024-07-30

## 2024-07-30 NOTE — CARE COORDINATION
Care Transitions Note    Follow Up Call     Patient Current Location:  Home: [address] [facility] [place] [address]    Care Transition Nurse contacted the patient by telephone. Verified name and  as identifiers.    Additional needs identified to be addressed with provider   No needs identified                 Method of communication with provider: none.    Care Summary Note: Patient reached for follow up call. States PCP ofc has not called for follow up. States urinary symptoms have resolved, stooling without issue via stoma. [facility] continues twice weekly. Educated to contact HHC if notes symptoms of UTI such as burning or pain with urination. Denies further concerns, agrees to follow up in 1 week.     Plan of care updates since last contact:  Review of patient management of conditions/medications: urinary, GI  Communication with providers: HFU need       Advance Care Planning:   Does patient have an Advance Directive: deferred at this time, will discuss on future follow up. .    Medication Review:  Full medication reconciliation completed during previous call.    Remote Patient Monitoring:  Offered patient enrollment in the Remote Patient Monitoring (RPM) program for in-home monitoring: Deferred at this time because will discuss at next outreach.    Assessments:  Care Transitions Subsequent and Final Call    Subsequent and Final Calls  Do you have any ongoing symptoms?: No  Have your medications changed?: No  Do you have any questions related to your medications?: No  Do you currently have any active services?: Yes  Are you currently active with any services?: Home Health  Do you have any needs or concerns that I can assist you with?: No  Identified Barriers: None  Care Transitions Interventions     Other Services: Completed (Comment: wound/ostomy)   Other Interventions:              Follow Up Appointment:   Reviewed upcoming appointment(s).  Future Appointments         Provider Specialty Dept Phone    2024 10:30 AM

## 2024-08-02 DIAGNOSIS — F51.01 PRIMARY INSOMNIA: ICD-10-CM

## 2024-08-02 DIAGNOSIS — F32.9 REACTIVE DEPRESSION: ICD-10-CM

## 2024-08-02 RX ORDER — QUETIAPINE FUMARATE 25 MG/1
25 TABLET, FILM COATED ORAL NIGHTLY
Qty: 30 TABLET | Refills: 1 | Status: SHIPPED | OUTPATIENT
Start: 2024-08-02

## 2024-08-07 ENCOUNTER — CARE COORDINATION (OUTPATIENT)
Dept: CARE COORDINATION | Age: 64
End: 2024-08-07

## 2024-08-07 NOTE — CARE COORDINATION
Care Transitions Note    Follow Up Call     Patient Current Location:  Home: [address] [facility] [place] [address]    Care Transition Nurse contacted the patient by telephone. Verified name and  as identifiers.    Additional needs identified to be addressed with provider   No needs identified                 Method of communication with provider: none.    Care Summary Note: Patient reached for follow up call. States is doing well. Has completed wound care HFU and will see surgeon, Dr. Bustillo on . [facility] continues in home once weekly. Denies urinary symptoms. Verbalizes flomax working well for her. Discussed value of reporting symptoms to [facility] for early interventions, avoiding ER visits, verbalizes understanding. Reviewed RPM for management of HTN. Patient declines stating does not need at this time. Reviewed ACP, patient has completed, not on file, encouraged to bring to PCP ofc at next appt. Denies further concerns or questions, agrees to follow up next week.     Plan of care updates since last contact:  Review of patient management of conditions/medications: follow up appts, current symptoms  Home Health: contact for urinary symptoms        Advance Care Planning:   Does patient have an Advance Directive: Not on file; patient encouraged to bring existing ACP documents to a [facility] facility..    Medication Review:  Full medication reconciliation completed during previous call. and No changes since last call.     Remote Patient Monitoring:  Offered patient enrollment in the Remote Patient Monitoring (RPM) program for in-home monitoring: Yes, but did not enroll at this time: controlled chronic disease management and declined to enroll in the program because states does not need .    Assessments:  Care Transitions Subsequent and Final Call    Schedule Follow Up Appointment with PCP: Completed  Subsequent and Final Calls  Do you have any ongoing symptoms?: No  Have your medications changed?: No  Do you have any

## 2024-08-15 ENCOUNTER — TELEPHONE (OUTPATIENT)
Dept: PRIMARY CARE CLINIC | Age: 64
End: 2024-08-15

## 2024-08-15 ENCOUNTER — CARE COORDINATION (OUTPATIENT)
Dept: CARE COORDINATION | Age: 64
End: 2024-08-15

## 2024-08-15 DIAGNOSIS — R30.0 DYSURIA: Primary | ICD-10-CM

## 2024-08-15 DIAGNOSIS — D50.9 IRON DEFICIENCY ANEMIA, UNSPECIFIED IRON DEFICIENCY ANEMIA TYPE: ICD-10-CM

## 2024-08-15 NOTE — TELEPHONE ENCOUNTER
Urine analysis and culture ordered.  She also should have a hemoglobin checked since she did get more anemic

## 2024-08-15 NOTE — TELEPHONE ENCOUNTER
[facility] Transition Nurse called with the patient on 3-way to notify that the patient was recently at the hospital for UTI on 7/21-7/23. She is currently very fatigue with appetite changes. She feels her symptoms are returning. She's scheduled on 8/21 with Dr Sosa but requesting an order put in, she does have [facility] who will be out to see her today.     Please advise.     [facility] F: [phone]

## 2024-08-15 NOTE — CARE COORDINATION
Care Transitions Note    Follow Up Call     Patient Current Location:  Home: [address] [place] [address]    Care Transition Nurse contacted the patient by telephone. Verified name and  as identifiers.    Additional needs identified to be addressed with provider   High priority: fatigue, decreased appetite                 Method of communication with provider: phone.    Care Summary Note: Patient reached for follow up. States not feeling well, feeling fatigue & decreased appetite. Three-way call to Dr. Brasher's [facility], spoke with Rhea. Updated on patient symptoms, patient declined next-day appt. Appt scheduled with Dr. Sosa on . CTN updated MD [facility] staff [facility] continues in home, phone and fax number given. Patient states will be out today. Further educated patient on urgent care for needs and states understanding. States stooling normally via stoma. Educated to increase fluids and states understanding. Denies further concerns. Agrees to follow up early next week.     Plan of care updates since last contact:  Education: HHC, PCP, urgent care for needs  Review of patient management of conditions/medications: symptoms       Advance Care Planning:   Does patient have an Advance Directive: reviewed during previous call, see note. .    Medication Review:  Full medication reconciliation completed during previous call.    Remote Patient Monitoring:  Offered patient enrollment in the Remote Patient Monitoring (RPM) program for in-home monitoring: Yes, but did not enroll at this time: already monitoring with home equipment.    Assessments:  Care Transitions Subsequent and Final Call    Subsequent and Final Calls  Do you have any ongoing symptoms?: Yes  Onset of Patient-reported symptoms: Today  Patient-reported symptoms: Fatigue  Interventions for patient-reported symptoms: Notified PCP/Physician  Have your medications changed?: No  Do you have any questions related to your medications?: No  Do you currently

## 2024-08-19 ENCOUNTER — CARE COORDINATION (OUTPATIENT)
Dept: CARE COORDINATION | Age: 64
End: 2024-08-19

## 2024-08-19 NOTE — CARE COORDINATION
assist you with?: No  Identified Barriers: None  Care Transitions Interventions     Other Services: Completed (Comment: wound/ostomy)   Other Interventions:              Follow Up Appointment:   Reviewed upcoming appointment(s).  Future Appointments         Provider Specialty Dept Phone    8/21/2024 11:00 AM Frankie Sosa DO Primary Care [phone]    8/28/2024 10:30 AM Christy Lynn MD Physical Medicine and Rehab [phone]    10/23/2024 10:20 AM Giovanna Brasher MD Primary Care [phone]            Care Transition Nurse provided contact information.  Plan for follow-up call in 2-5 days based on severity of symptoms and risk factors.  Plan for next call: symptom management-any further fatigue/ appetite changes/ nausea? [facility] input on stoma  follow-up appointment-lab draws completed? Changes since MD appt?      Ronit Woodward, RN

## 2024-08-20 PROBLEM — N39.0 UTI (URINARY TRACT INFECTION): Status: RESOLVED | Noted: 2024-07-21 | Resolved: 2024-08-20

## 2024-08-22 ENCOUNTER — CARE COORDINATION (OUTPATIENT)
Dept: CARE COORDINATION | Age: 64
End: 2024-08-22

## 2024-08-22 NOTE — CARE COORDINATION
Care Transitions Note    Final Call     Patient Current Location:  Home: [address] [address]    Care Transition Nurse contacted the patient by telephone. Verified name and  as identifiers.    Patient graduated from the Care Transitions program on 24.  Patient/family has the ability to self manage at this time..      Advance Care Planning:   Does patient have an Advance Directive: reviewed during previous call, see note. .    Handoff:   Patient was not referred to the ACM team due to no additional needs identified.       Care Summary Note: Patient reached for final call. States cancelled PCP appt because urinary symptoms subsided. Reviewed UA and blood labs, states did not complete due to feeling well. Reinforced good practice of notifying MD ofc with early signs of urinary discomfort, frequency, urgency. Also home health and urgent care. States understanding. Patient states spouse is retiring next week and will be able to assist with appointments. States daughter is a nurse as well. Denies further concerns. [facility] continues in home for support with stoma management. Agrees to graduation.     Assessments:  Care Transitions Subsequent and Final Call    Subsequent and Final Calls  Do you have any ongoing symptoms?: No  Have your medications changed?: No  Do you have any questions related to your medications?: No  Do you currently have any active services?: Yes  Are you currently active with any services?: Home Health  Do you have any needs or concerns that I can assist you with?: No  Identified Barriers: None  Care Transitions Interventions     Other Services: Completed (Comment: wound/ostomy)   Other Interventions:              Upcoming Appointments:    Future Appointments         Provider Specialty Dept Phone    2024 10:30 AM Christy Lynn MD Physical Medicine and Rehab [phone]    10/23/2024 10:20 AM Giovanna Brasher MD Primary Care [phone]            Patient has agreed to contact

## 2024-08-24 DIAGNOSIS — I63.9 CEREBROVASCULAR ACCIDENT (CVA), UNSPECIFIED MECHANISM (HCC): ICD-10-CM

## 2024-08-26 RX ORDER — ATORVASTATIN CALCIUM 40 MG/1
TABLET, FILM COATED ORAL
Qty: 24 TABLET | Refills: 0 | Status: SHIPPED | OUTPATIENT
Start: 2024-08-26

## 2024-09-06 ENCOUNTER — HOSPITAL ENCOUNTER (EMERGENCY)
Age: 64
Discharge: HOME | End: 2024-09-07
Attending: EMERGENCY MEDICINE
Payer: MEDICARE

## 2024-09-06 DIAGNOSIS — F12.90 MARIJUANA USE: ICD-10-CM

## 2024-09-06 DIAGNOSIS — R44.3 HALLUCINATIONS: Primary | ICD-10-CM

## 2024-09-06 LAB
ANION GAP SERPL CALCULATED.3IONS-SCNC: 12 MMOL/L (ref 9–17)
BACTERIA URNS QL MICRO: ABNORMAL
BILIRUB UR QL STRIP: NEGATIVE
BUN SERPL-MCNC: 10 MG/DL (ref 8–23)
CALCIUM SERPL-MCNC: 9.4 MG/DL (ref 8.6–10.4)
CASTS #/AREA URNS LPF: ABNORMAL /LPF
CHLORIDE SERPL-SCNC: 102 MMOL/L (ref 98–107)
CLARITY UR: CLEAR
CO2 SERPL-SCNC: 26 MMOL/L (ref 20–31)
COLOR UR: YELLOW
CREAT SERPL-MCNC: 0.6 MG/DL (ref 0.5–0.9)
EPI CELLS #/AREA URNS HPF: ABNORMAL /HPF
GFR, ESTIMATED: >90 ML/MIN/1.73M2
GLUCOSE SERPL-MCNC: 89 MG/DL (ref 70–99)
GLUCOSE UR STRIP-MCNC: NEGATIVE MG/DL
HGB UR QL STRIP.AUTO: NEGATIVE
KETONES UR STRIP-MCNC: NEGATIVE MG/DL
LEUKOCYTE ESTERASE UR QL STRIP: NEGATIVE
MAGNESIUM SERPL-MCNC: 1.1 MG/DL (ref 1.6–2.6)
NITRITE UR QL STRIP: NEGATIVE
PH UR STRIP: 6 [PH] (ref 5–8)
POTASSIUM SERPL-SCNC: 3.4 MMOL/L (ref 3.7–5.3)
PROT UR STRIP-MCNC: NEGATIVE MG/DL
RBC #/AREA URNS HPF: ABNORMAL /HPF
SODIUM SERPL-SCNC: 140 MMOL/L (ref 135–144)
SP GR UR STRIP: 1.01 (ref 1–1.03)
UROBILINOGEN UR STRIP-ACNC: NORMAL EU/DL (ref 0–1)
WBC #/AREA URNS HPF: ABNORMAL /HPF

## 2024-09-06 PROCEDURE — 85025 COMPLETE CBC W/AUTO DIFF WBC: CPT

## 2024-09-06 PROCEDURE — 6360000002 HC RX W HCPCS

## 2024-09-06 PROCEDURE — 87077 CULTURE AEROBIC IDENTIFY: CPT

## 2024-09-06 PROCEDURE — 87086 URINE CULTURE/COLONY COUNT: CPT

## 2024-09-06 PROCEDURE — 80048 BASIC METABOLIC PNL TOTAL CA: CPT

## 2024-09-06 PROCEDURE — 81001 URINALYSIS AUTO W/SCOPE: CPT

## 2024-09-06 PROCEDURE — 96365 THER/PROPH/DIAG IV INF INIT: CPT

## 2024-09-06 PROCEDURE — 87186 SC STD MICRODIL/AGAR DIL: CPT

## 2024-09-06 PROCEDURE — 36415 COLL VENOUS BLD VENIPUNCTURE: CPT

## 2024-09-06 PROCEDURE — 99284 EMERGENCY DEPT VISIT MOD MDM: CPT

## 2024-09-06 PROCEDURE — 6370000000 HC RX 637 (ALT 250 FOR IP)

## 2024-09-06 PROCEDURE — 83735 ASSAY OF MAGNESIUM: CPT

## 2024-09-06 RX ORDER — MAGNESIUM SULFATE 1 G/100ML
1000 INJECTION INTRAVENOUS ONCE
Status: COMPLETED | OUTPATIENT
Start: 2024-09-06 | End: 2024-09-07

## 2024-09-06 RX ADMIN — POTASSIUM BICARBONATE 40 MEQ: 782 TABLET, EFFERVESCENT ORAL at 23:27

## 2024-09-06 RX ADMIN — MAGNESIUM SULFATE HEPTAHYDRATE 1000 MG: 1 INJECTION, SOLUTION INTRAVENOUS at 23:26

## 2024-09-06 ASSESSMENT — PAIN - FUNCTIONAL ASSESSMENT: PAIN_FUNCTIONAL_ASSESSMENT: 0-10

## 2024-09-06 ASSESSMENT — ENCOUNTER SYMPTOMS
SHORTNESS OF BREATH: 0
ABDOMINAL PAIN: 0

## 2024-09-06 ASSESSMENT — PAIN DESCRIPTION - PAIN TYPE: TYPE: ACUTE PAIN

## 2024-09-06 ASSESSMENT — PAIN SCALES - GENERAL: PAINLEVEL_OUTOF10: 6

## 2024-09-06 ASSESSMENT — PAIN DESCRIPTION - LOCATION: LOCATION: GENERALIZED

## 2024-09-06 ASSESSMENT — PAIN DESCRIPTION - DESCRIPTORS: DESCRIPTORS: ACHING

## 2024-09-07 VITALS
HEIGHT: 61 IN | TEMPERATURE: 97.6 F | SYSTOLIC BLOOD PRESSURE: 135 MMHG | BODY MASS INDEX: 20.77 KG/M2 | HEART RATE: 104 BPM | RESPIRATION RATE: 18 BRPM | OXYGEN SATURATION: 91 % | WEIGHT: 110 LBS | DIASTOLIC BLOOD PRESSURE: 71 MMHG

## 2024-09-07 LAB
ABSOLUTE BANDS: 0.09 K/UL (ref 0–1)
BANDS: 3 % (ref 0–10)
BASOPHILS # BLD: 0.03 K/UL (ref 0–0.2)
BASOPHILS NFR BLD: 1 % (ref 0–2)
EOSINOPHIL # BLD: 0 K/UL (ref 0–0.4)
EOSINOPHILS RELATIVE PERCENT: 0 % (ref 0–4)
ERYTHROCYTE [DISTWIDTH] IN BLOOD BY AUTOMATED COUNT: 15.1 % (ref 11.5–14.9)
HCT VFR BLD AUTO: 26.9 % (ref 36–46)
HGB BLD-MCNC: 9 G/DL (ref 12–16)
LYMPHOCYTES NFR BLD: 0.68 K/UL (ref 1–4.8)
LYMPHOCYTES RELATIVE PERCENT: 23 % (ref 24–44)
MCH RBC QN AUTO: 35.9 PG (ref 26–34)
MCHC RBC AUTO-ENTMCNC: 33.7 G/DL (ref 31–37)
MCV RBC AUTO: 106.7 FL (ref 80–100)
MONOCYTES NFR BLD: 0.15 K/UL (ref 0.1–1.3)
MONOCYTES NFR BLD: 5 % (ref 1–7)
MORPHOLOGY: ABNORMAL
NEUTROPHILS NFR BLD: 68 % (ref 36–66)
NEUTS SEG NFR BLD: 1.99 K/UL (ref 1.3–9.1)
NUCLEATED RED BLOOD CELLS: 2 PER 100 WBC
PLATELET # BLD AUTO: 244 K/UL (ref 150–450)
PMV BLD AUTO: 6.4 FL (ref 6–12)
RBC # BLD AUTO: 2.52 M/UL (ref 4–5.2)
WBC OTHER # BLD: 2.9 K/UL (ref 3.5–11)

## 2024-09-07 NOTE — ED PROVIDER NOTES
[facility] ED  Emergency Department Encounter  Emergency Medicine Resident     Pt Name:Rajani Iglesias  MRN: [id number]  Birthdate 1960  Date of evaluation: 9/6/24  PCP:  Giovanna Brasher MD  Note Started: 11:48 PM EDT      CHIEF COMPLAINT       Chief Complaint   Patient presents with    Altered Mental Status      states that patient has not been acting her normal, crying out, and possibly hallucinating. Reports she had a UTI 6 mo ago with similar symptoms, Also was taken off all opiates 3 days ago due to severe constipation.        HISTORY OF PRESENT ILLNESS  (Location/Symptom, Timing/Onset, Context/Setting, Quality, Duration, Modifying Factors, Severity.)      Rajani Iglesias is a 64 y.o. female  who presents with hallucinations. The hallucinations occurred earlier today, with the patient reporting seeing things coming out of the wall. She denies hearing voices. The patient has a history of urinary tract infections (UTIs), the most recent being six months ago.  is concerned about another UTI. The patient’s family member mentioned that she has been tapering off methadone for pain management over the past year and has been using THC for pain control. She denies any thoughts of self-harm. The patient is eating and drinking normally and has normal output from her ostomy, although she reports a lot of gas production. The patient currently denies having any abdominal pain.  No dysuria or hematuria.  She states that she currently feels at her baseline.    PAST MEDICAL / SURGICAL / SOCIAL / FAMILY HISTORY      has a past medical history of Anxiety, Arthritis, At maximum risk for fall, Breast cancer (HCC), Brief psychotic disorder (HCC), Cancer (HCC), Cauda equina syndrome (HCC), Cerebrovascular accident (HCC), Cervical stenosis of spine, GERD (gastroesophageal reflux disease), History of stomach ulcers, History of therapeutic radiation, Hx antineoplastic chemo, Hypertension, Hypothyroidism,  Vitamins-Minerals (THERAPEUTIC MULTIVITAMIN-MINERALS) tablet Take 1 tablet by mouth daily 7/7/24   Saad Bustillo DO   tamsulosin (FLOMAX) 0.4 MG capsule Take 1 capsule by mouth daily 7/7/24   Saad Bustillo DO   pantoprazole (PROTONIX) 40 MG tablet Take 1 tablet by mouth daily 6/20/24   Frankie Sosa DO   dilTIAZem (CARDIZEM CD) 240 MG extended release capsule TAKE 1 CAPSULE BY MOUTH DAILY 6/18/24 10/16/24  Frankie Sosa DO   folic acid (FOLVITE) 1 MG tablet TAKE 1 TABLET BY MOUTH DAILY 6/18/24   Frankie Sosa DO   lisinopril (PRINIVIL;ZESTRIL) 5 MG tablet TAKE 1 TABLET BY MOUTH DAILY 5/8/24   Giovanna Brsaher MD   busPIRone (BUSPAR) 30 MG tablet Take 30 mg by mouth 2 times daily  Patient not taking: Reported on 7/24/2024 2/29/24   Giovanna Brasher MD   zoledronic acid (RECLAST) 5 MG/100ML SOLN Infuse 100 mLs intravenously once for 1 dose 2/16/24 6/20/25  Giovanna Brasher MD   vitamin D3 (CHOLECALCIFEROL) 25 MCG (1000 UT) TABS tablet Take 2 tablets by mouth daily 2/16/24   Giovanna Brasher MD   acetaminophen (TYLENOL) 325 MG tablet Take 2 tablets by mouth every 6 hours as needed for Pain 1/1/24   Pilar Ring MD   aspirin EC 81 MG EC tablet Take 1 tablet by mouth daily 1/1/24   Pilar Ring MD   docusate sodium (COLACE, DULCOLAX) 100 MG CAPS Take 100 mg by mouth 2 times daily 1/1/24   Pilar Ring MD   Handicap [facility] M[facility] by Does not apply route Cannot walk 200 feet due to medical issues  Expires 7/14/2027 7/15/22   Giovanna Brasher MD   methadone (DOLOPHINE) 10 MG tablet Take 1 tablet by mouth 2 times daily.    Provider, MD Robert         REVIEW OF SYSTEMS       Review of Systems   Respiratory:  Negative for shortness of breath.    Cardiovascular:  Negative for chest pain.   Gastrointestinal:  Negative for abdominal pain.   Genitourinary:  Negative for dysuria and hematuria.   Psychiatric/Behavioral:  Positive for hallucinations. Negative for self-injury and suicidal

## 2024-09-07 NOTE — ED PROVIDER NOTES
[facility] ED  eMERGENCY dEPARTMENT eNCOUnter   Attending Attestation     Pt Name: Rajani Iglesias  MRN: [id number]  Birthdate 1960  Date of evaluation: 9/6/24       Rajani Iglesias is a 64 y.o. female who presents with Altered Mental Status ( states that patient has not been acting her normal, crying out, and possibly hallucinating. Reports she had a UTI 6 mo ago with similar symptoms, Also was taken off all opiates 3 days ago due to severe constipation. )      History:   Patient is here because she has had some altered mental status with some confusion and hallucinations over the last day or so.  Patient had similar symptoms about 6 months ago when she had a UTI so I think she is has a UTI.  Patient was recently hospitalized for colostomy due to bowel obstruction and also was weaned off of her opiates and has been taking THC Gummies for the last couple weeks.    Exam: Vitals:   Vitals:    09/06/24 2056   BP: 135/71   Pulse: (!) 107   Resp: 18   Temp: 97.6 °F (36.4 °C)   TempSrc: Oral   SpO2: 97%   Weight: 49.9 kg (110 lb)   Height: 1.549 m (5' 1\")     Heart regular rate rhythm no murmurs.  Lungs clear auscultation bilaterally.  Abdomen soft nontender nondistended.  Patient is able to answer all my questions appropriately but does seem a little lethargic.    I performed a history and physical examination of the patient and discussed management with the resident. I reviewed the resident’s note and agree with the documented findings and plan of care. Any areas of disagreement are noted on the chart. I was personally present for the key portions of any procedures. I have documented in the chart those procedures where I was not present during the key portions. I have personally reviewed all images and agree with the resident's interpretation. I have reviewed the emergency nurses triage note. I agree with the chief complaint, past medical history, past surgical history, allergies, medications, social and

## 2024-09-07 NOTE — DISCHARGE INSTRUCTIONS
Thank you for visiting [facility] Emergency Department.    You do not have a UTI. Your potassium was low, which was replaced.  The visual hallucinations you had earlier may be secondary to the THC Gummies.    You need to call Giovanna Brasher MD to make an appointment as directed for follow up.    Should you have any questions regarding your care or further treatment, please call [facility] Emergency Department.

## 2024-09-08 LAB
MICROORGANISM SPEC CULT: ABNORMAL
SERVICE CMNT-IMP: ABNORMAL
SPECIMEN DESCRIPTION: ABNORMAL

## 2024-09-09 LAB
MICROORGANISM SPEC CULT: ABNORMAL
SERVICE CMNT-IMP: ABNORMAL
SPECIMEN DESCRIPTION: ABNORMAL

## 2024-09-10 ENCOUNTER — OFFICE VISIT (OUTPATIENT)
Dept: FAMILY MEDICINE CLINIC | Age: 64
End: 2024-09-10

## 2024-09-10 VITALS
OXYGEN SATURATION: 98 % | HEART RATE: 114 BPM | DIASTOLIC BLOOD PRESSURE: 60 MMHG | BODY MASS INDEX: 20.2 KG/M2 | HEIGHT: 61 IN | WEIGHT: 107 LBS | SYSTOLIC BLOOD PRESSURE: 98 MMHG

## 2024-09-10 DIAGNOSIS — F51.01 PRIMARY INSOMNIA: ICD-10-CM

## 2024-09-10 DIAGNOSIS — E83.42 HYPOMAGNESEMIA: ICD-10-CM

## 2024-09-10 DIAGNOSIS — E03.8 OTHER SPECIFIED HYPOTHYROIDISM: Primary | ICD-10-CM

## 2024-09-10 DIAGNOSIS — K92.1 BLOODY STOOL: ICD-10-CM

## 2024-09-10 DIAGNOSIS — Z93.2 ILEOSTOMY IN PLACE (HCC): ICD-10-CM

## 2024-09-10 DIAGNOSIS — F32.9 REACTIVE DEPRESSION: ICD-10-CM

## 2024-09-10 DIAGNOSIS — E61.1 IRON DEFICIENCY: ICD-10-CM

## 2024-09-10 DIAGNOSIS — E87.6 HYPOKALEMIA: ICD-10-CM

## 2024-09-10 DIAGNOSIS — R63.4 ABNORMAL WEIGHT LOSS: ICD-10-CM

## 2024-09-10 DIAGNOSIS — S14.109S SPINAL CORD INJURY, CERVICAL REGION, SEQUELA (HCC): ICD-10-CM

## 2024-09-10 DIAGNOSIS — N30.00 ACUTE CYSTITIS WITHOUT HEMATURIA: ICD-10-CM

## 2024-09-10 DIAGNOSIS — M48.062 LUMBAR STENOSIS WITH NEUROGENIC CLAUDICATION: ICD-10-CM

## 2024-09-10 DIAGNOSIS — Z12.31 SCREENING MAMMOGRAM FOR HIGH-RISK PATIENT: ICD-10-CM

## 2024-09-10 DIAGNOSIS — Z23 ENCOUNTER FOR IMMUNIZATION: ICD-10-CM

## 2024-09-10 DIAGNOSIS — E55.9 VITAMIN D DEFICIENCY: ICD-10-CM

## 2024-09-10 DIAGNOSIS — R97.0 ELEVATED CEA: ICD-10-CM

## 2024-09-10 DIAGNOSIS — I50.32 CHRONIC DIASTOLIC HEART FAILURE (HCC): ICD-10-CM

## 2024-09-10 DIAGNOSIS — E83.51 HYPOCALCEMIA: ICD-10-CM

## 2024-09-10 PROBLEM — R63.0 DECREASED APPETITE: Status: RESOLVED | Noted: 2023-12-13 | Resolved: 2024-09-10

## 2024-09-10 PROBLEM — R41.0 DELIRIUM: Status: RESOLVED | Noted: 2023-12-15 | Resolved: 2024-09-10

## 2024-09-10 PROBLEM — G93.40 ENCEPHALOPATHY: Status: RESOLVED | Noted: 2023-12-24 | Resolved: 2024-09-10

## 2024-09-10 PROBLEM — R45.89 ANXIETY ABOUT HEALTH: Status: RESOLVED | Noted: 2021-12-17 | Resolved: 2024-09-10

## 2024-09-10 PROBLEM — M25.551 PAIN OF RIGHT HIP JOINT: Status: RESOLVED | Noted: 2018-03-28 | Resolved: 2024-09-10

## 2024-09-10 PROBLEM — R53.83 FATIGUE: Status: RESOLVED | Noted: 2023-12-13 | Resolved: 2024-09-10

## 2024-09-10 PROBLEM — G82.50 TETRAPARESIS (HCC): Status: RESOLVED | Noted: 2023-12-26 | Resolved: 2024-09-10

## 2024-09-10 PROBLEM — K56.609 LARGE BOWEL OBSTRUCTION (HCC): Status: RESOLVED | Noted: 2024-07-02 | Resolved: 2024-09-10

## 2024-09-10 PROBLEM — R13.19 ESOPHAGEAL DYSPHAGIA: Status: RESOLVED | Noted: 2020-07-27 | Resolved: 2024-09-10

## 2024-09-10 PROBLEM — B96.5 PSEUDOMONAS URINARY TRACT INFECTION: Status: RESOLVED | Noted: 2022-01-02 | Resolved: 2024-09-10

## 2024-09-10 PROBLEM — R53.2 QUADRIPLEGIA, FUNCTIONAL (HCC): Status: RESOLVED | Noted: 2023-12-20 | Resolved: 2024-09-10

## 2024-09-10 PROBLEM — I95.2 HYPOTENSION DUE TO DRUGS: Status: RESOLVED | Noted: 2020-07-27 | Resolved: 2024-09-10

## 2024-09-10 PROBLEM — N39.0 PSEUDOMONAS URINARY TRACT INFECTION: Status: RESOLVED | Noted: 2022-01-02 | Resolved: 2024-09-10

## 2024-09-10 RX ORDER — QUETIAPINE FUMARATE 25 MG/1
25 TABLET, FILM COATED ORAL NIGHTLY
Qty: 30 TABLET | Refills: 1 | Status: SHIPPED | OUTPATIENT
Start: 2024-09-10

## 2024-09-10 SDOH — ECONOMIC STABILITY: FOOD INSECURITY: WITHIN THE PAST 12 MONTHS, YOU WORRIED THAT YOUR FOOD WOULD RUN OUT BEFORE YOU GOT MONEY TO BUY MORE.: NEVER TRUE

## 2024-09-10 SDOH — ECONOMIC STABILITY: FOOD INSECURITY: WITHIN THE PAST 12 MONTHS, THE FOOD YOU BOUGHT JUST DIDN'T LAST AND YOU DIDN'T HAVE MONEY TO GET MORE.: NEVER TRUE

## 2024-09-10 SDOH — ECONOMIC STABILITY: INCOME INSECURITY: HOW HARD IS IT FOR YOU TO PAY FOR THE VERY BASICS LIKE FOOD, HOUSING, MEDICAL CARE, AND HEATING?: NOT HARD AT ALL

## 2024-09-10 ASSESSMENT — ENCOUNTER SYMPTOMS
SHORTNESS OF BREATH: 1
COLOR CHANGE: 0
RHINORRHEA: 0
CHEST TIGHTNESS: 0
EYE REDNESS: 0
SINUS PRESSURE: 0
BACK PAIN: 1
BLOOD IN STOOL: 1
CONSTIPATION: 0
ABDOMINAL DISTENTION: 0
RECTAL PAIN: 0
WHEEZING: 0
COUGH: 0
DIARRHEA: 0
NAUSEA: 0
TROUBLE SWALLOWING: 0
VOMITING: 0
SORE THROAT: 0
STRIDOR: 0

## 2024-09-10 ASSESSMENT — PATIENT HEALTH QUESTIONNAIRE - PHQ9
SUM OF ALL RESPONSES TO PHQ QUESTIONS 1-9: 8
8. MOVING OR SPEAKING SO SLOWLY THAT OTHER PEOPLE COULD HAVE NOTICED. OR THE OPPOSITE, BEING SO FIGETY OR RESTLESS THAT YOU HAVE BEEN MOVING AROUND A LOT MORE THAN USUAL: SEVERAL DAYS
1. LITTLE INTEREST OR PLEASURE IN DOING THINGS: SEVERAL DAYS
6. FEELING BAD ABOUT YOURSELF - OR THAT YOU ARE A FAILURE OR HAVE LET YOURSELF OR YOUR FAMILY DOWN: SEVERAL DAYS
SUM OF ALL RESPONSES TO PHQ9 QUESTIONS 1 & 2: 2
5. POOR APPETITE OR OVEREATING: SEVERAL DAYS
4. FEELING TIRED OR HAVING LITTLE ENERGY: SEVERAL DAYS
SUM OF ALL RESPONSES TO PHQ QUESTIONS 1-9: 8
9. THOUGHTS THAT YOU WOULD BE BETTER OFF DEAD, OR OF HURTING YOURSELF: NOT AT ALL
7. TROUBLE CONCENTRATING ON THINGS, SUCH AS READING THE NEWSPAPER OR WATCHING TELEVISION: SEVERAL DAYS
10. IF YOU CHECKED OFF ANY PROBLEMS, HOW DIFFICULT HAVE THESE PROBLEMS MADE IT FOR YOU TO DO YOUR WORK, TAKE CARE OF THINGS AT HOME, OR GET ALONG WITH OTHER PEOPLE: SOMEWHAT DIFFICULT
2. FEELING DOWN, DEPRESSED OR HOPELESS: SEVERAL DAYS
SUM OF ALL RESPONSES TO PHQ QUESTIONS 1-9: 8
SUM OF ALL RESPONSES TO PHQ QUESTIONS 1-9: 8

## 2024-09-17 ENCOUNTER — ENROLLMENT (OUTPATIENT)
Dept: CARE COORDINATION | Age: 64
End: 2024-09-17

## 2024-10-07 ENCOUNTER — HOSPITAL ENCOUNTER (OUTPATIENT)
Age: 64
Setting detail: SPECIMEN
Discharge: HOME | End: 2024-10-07

## 2024-10-07 LAB
25(OH)D3 SERPL-MCNC: 68.5 NG/ML (ref 30–100)
ALBUMIN SERPL-MCNC: 3.2 G/DL (ref 3.5–5.2)
ALBUMIN/GLOB SERPL: 1 {RATIO} (ref 1–2.5)
ALP SERPL-CCNC: 103 U/L (ref 35–104)
ALT SERPL-CCNC: 14 U/L (ref 10–35)
ANION GAP SERPL CALCULATED.3IONS-SCNC: 12 MMOL/L (ref 9–16)
AST SERPL-CCNC: 21 U/L (ref 10–35)
BILIRUB SERPL-MCNC: 0.4 MG/DL (ref 0–1.2)
BUN SERPL-MCNC: 8 MG/DL (ref 8–23)
CALCIUM SERPL-MCNC: 9.1 MG/DL (ref 8.6–10.4)
CHLORIDE SERPL-SCNC: 102 MMOL/L (ref 98–107)
CHOLEST SERPL-MCNC: 76 MG/DL (ref 0–199)
CHOLESTEROL/HDL RATIO: 2
CO2 SERPL-SCNC: 20 MMOL/L (ref 20–31)
CREAT SERPL-MCNC: 0.6 MG/DL (ref 0.5–0.9)
ERYTHROCYTE [DISTWIDTH] IN BLOOD BY AUTOMATED COUNT: 15 % (ref 11.8–14.4)
GFR, ESTIMATED: >90 ML/MIN/1.73M2
GLUCOSE SERPL-MCNC: 100 MG/DL (ref 74–99)
HCT VFR BLD AUTO: 24.5 % (ref 36.3–47.1)
HDLC SERPL-MCNC: 38 MG/DL
HGB BLD-MCNC: 7.7 G/DL (ref 11.9–15.1)
LDLC SERPL CALC-MCNC: 22 MG/DL (ref 0–100)
MAGNESIUM SERPL-MCNC: 1.4 MG/DL (ref 1.6–2.4)
MCH RBC QN AUTO: 35.6 PG (ref 25.2–33.5)
MCHC RBC AUTO-ENTMCNC: 31.4 G/DL (ref 28.4–34.8)
MCV RBC AUTO: 113.4 FL (ref 82.6–102.9)
NRBC BLD-RTO: 0 PER 100 WBC
PLATELET # BLD AUTO: 402 K/UL (ref 138–453)
PMV BLD AUTO: 9.6 FL (ref 8.1–13.5)
POTASSIUM SERPL-SCNC: 3.5 MMOL/L (ref 3.7–5.3)
PROT SERPL-MCNC: 6.5 G/DL (ref 6.6–8.7)
RBC # BLD AUTO: 2.16 M/UL (ref 3.95–5.11)
SODIUM SERPL-SCNC: 134 MMOL/L (ref 136–145)
TRIGL SERPL-MCNC: 80 MG/DL (ref 0–149)
TSH SERPL DL<=0.05 MIU/L-ACNC: 14.9 UIU/ML (ref 0.27–4.2)
VLDLC SERPL CALC-MCNC: 16 MG/DL
WBC OTHER # BLD: 7.2 K/UL (ref 3.5–11.3)

## 2024-10-07 PROCEDURE — 85027 COMPLETE CBC AUTOMATED: CPT

## 2024-10-07 PROCEDURE — P9603 ONE-WAY ALLOW PRORATED MILES: HCPCS

## 2024-10-07 PROCEDURE — 80053 COMPREHEN METABOLIC PANEL: CPT

## 2024-10-07 PROCEDURE — 80061 LIPID PANEL: CPT

## 2024-10-07 PROCEDURE — 84443 ASSAY THYROID STIM HORMONE: CPT

## 2024-10-07 PROCEDURE — 83735 ASSAY OF MAGNESIUM: CPT

## 2024-10-07 PROCEDURE — 82306 VITAMIN D 25 HYDROXY: CPT

## 2024-10-19 DIAGNOSIS — K21.9 GASTROESOPHAGEAL REFLUX DISEASE, UNSPECIFIED WHETHER ESOPHAGITIS PRESENT: ICD-10-CM

## 2024-10-21 RX ORDER — PANTOPRAZOLE SODIUM 40 MG/1
40 TABLET, DELAYED RELEASE ORAL DAILY
Qty: 90 TABLET | OUTPATIENT
Start: 2024-10-21

## 2024-10-22 ENCOUNTER — TELEPHONE (OUTPATIENT)
Dept: FAMILY MEDICINE CLINIC | Age: 64
End: 2024-10-22

## 2024-10-22 NOTE — TELEPHONE ENCOUNTER
----- Message from Darien ARGUETA sent at 10/22/2024  9:45 AM EDT -----  Regarding: ECC Message to Provider  ECC Message to Provider    Relationship to Patient: Other/ Daughter: Amanda Max    Additional Information: Patient's daughter called in to inform the PCP Kailey Pepe MD that the patient is currently in the facility at [place], [place] and she's been for 3 weeks and she was been check there only once. Kindly give them an assistance or call back. Thank you!  --------------------------------------------------------------------------------------------------------------------------    Call Back Information: OK to leave message on voicemail  Preferred Call Back Number: Phone [phone]

## 2024-10-22 NOTE — TELEPHONE ENCOUNTER
On 06/10/19, Lillian DUNN, scribed these services personally performed by Dr. Rodolfo Olivier MD.     Chief Concern:    Chief Complaint   Patient presents with   • Lesion     Patient presents today accompanied by  , Ramon.    History of Present Illness  Elena Grace is a 61 year old female who presents to the Dermatology clinic for evaluation of the above chief concern(s).     Lesion   Location:  Right cheek near eye  Symptoms:  Asymptomatic.  Duration:  About 4 weeks  Treatment:  Tries to keep moisturized  Additional Information: Says it will feel raw and crack open.     She is on Nivolumab for stage IV RCC.      Sun Protection Habits: Yes, SPF unknown  History of Severe/Blistering Sun Burns: No  Tanning Bed Use: Yes, >100    Past Medical History  Reviewed in EPIC, with pertinent problems noted in HPI and below.   - Does have renal cancer, is having treatments monthly    Melanoma: No   Non-melanoma skin cancer:   - BCC, left temple, s/p Mohs in March 2019 with Dr. Kee    ALLERGIES:  No Known Allergies    Social History  Occupation: Works indoors  Household: Many hobbies outside    Family History  Melanoma: Yes Sister had BCC and Melanoma, sister BCC, brother BCC, Father BCC    Review of Systems  CONSTITUTIONAL: No recent fevers or recent illness.   HEME/LYMPH: No easy bleeding, swollen glands.   SKIN: No other skin changes, itching or bleeding skin lesions, except as noted above.   PREGNANCY STATUS: Not Pregnant    Physical Exam, Assessment and Plan  CONSTITUTIONAL: The patient is well-groomed and well-developed.   NEURO: Patient is alert and oriented x3; Normal mood and affect.   SKIN: Examination of the skin included focused exam of the face  Entire exam performed in the presence of a medical assistant chaperone.   Skin exam unremarkable except as noted below.     Skin Neoplasm(s) x 3  Description: A.) Superior lateral to right eye B.) Middle lateral to right eye C.) Inferior lateral to right  Patients daughter called in and wanted me to let you know her mom ( Rajani) has had a infection  and wanted to make sure you were informed with what was going on with her at [facility] she doesn't think she's getting the care she needs there and requested to have you give them a call.  Heres the number to the facility.[phone] Thanks   eye  Plan:    - Recommended biopsy for further diagnosis.  Discussed risks of biopsy including, but not limited to, pain and discomfort, bleeding, infection, scar, and non-diagnostic sample.  Patient indicated understanding and provided verbal consent to proceed. Written consent also obtained.   Procedure: Shave/Saucerization Biopsy  Body Site(s) and Ddx:   A.) Lateral to right eye Superior Comment - SCC vs BCC vs BLK vs [place] Maligna vs Inflamed SK  B.) Lateral to right eye Middle Comment - SCC vs BCC vs BLK vs [place] Maligna vs Inflamed SK  C.) Lateral to right eye Inferior Comment - SCC vs BCC vs BLK vs [place] Maligna vs Inflamed SK  Site(s) confirmed and Patient/guardian verbal consent obtained to take and place photos in medical record. and Photograph(s) taken of the affected area(s) and uploaded to the EMR. Time-out completed in presence of physician, medical assistant, and patient. Area(s) prepped with alcohol. Anesthetized with 1% lidocaine with epinephrine. Biopsy specimen(s) obtained with Dermablade/[place] blade. Hemostasis achieved with: 35% AIuminum Chloride. Dressing applied with Vaseline and bandage. Specimen(s) submitted for pathology. Wound care instructions discussed and handout provided.    History of NMSC  Description: scars at sites of prior skin cancers  Ddx: no evidence of recurrence  Plan:    - Advised patient to contact clinic if any new or changing lesions are identified. Recommended sun protection, including the use of SPF 30 or greater sunscreen.   - Patient directed to follow-up for any new concerning, symptomatic, bleeding, growing, or changing skin lesions.    - FBSE in about 6 months     Scar, posterior neck, s/p dilated pore/epi cyst excision.   Description: Right posterior neck linear scar with some nodularity only at the poles (consider buried stitch)  Plan:              - recommend gentle massage 5 minutes BID. If not sufficient improvement in a couple months, can injection  kenalog.      Follow up in 6 months for FBSE    I, Dr. Rodolfo Olivier, attest that I saw and examined the patient and agree with the above documentation as scribed.  The documentation recorded by the scribe accurately and completely reflects the service(s) I personally performed and the decisions made by me.

## 2024-10-23 NOTE — TELEPHONE ENCOUNTER
Patients are under the care of physicians in the nursing home, I am not sure if we are supposed to call, unless patient has been discharged from the nursing care.

## 2024-10-26 RX ORDER — LEVOTHYROXINE SODIUM 100 UG/1
100 TABLET ORAL DAILY
Qty: 90 TABLET | Refills: 0 | OUTPATIENT
Start: 2024-10-26

## 2024-10-28 RX ORDER — DILTIAZEM HYDROCHLORIDE 240 MG/1
240 CAPSULE, COATED, EXTENDED RELEASE ORAL DAILY
Qty: 30 CAPSULE | Refills: 3 | Status: ON HOLD | OUTPATIENT
Start: 2024-10-28 | End: 2025-02-25

## 2024-11-01 ENCOUNTER — APPOINTMENT (OUTPATIENT)
Dept: CT IMAGING | Age: 64
DRG: 438 | End: 2024-11-01
Payer: MEDICARE

## 2024-11-01 ENCOUNTER — HOSPITAL ENCOUNTER (INPATIENT)
Age: 64
LOS: 3 days | Discharge: HOME | DRG: 438 | End: 2024-11-04
Attending: EMERGENCY MEDICINE | Admitting: INTERNAL MEDICINE
Payer: MEDICARE

## 2024-11-01 DIAGNOSIS — K85.00 IDIOPATHIC ACUTE PANCREATITIS WITHOUT INFECTION OR NECROSIS: ICD-10-CM

## 2024-11-01 DIAGNOSIS — E87.20 LACTIC ACID ACIDOSIS: Primary | ICD-10-CM

## 2024-11-01 PROBLEM — K85.90 PANCREATITIS, UNSPECIFIED PANCREATITIS TYPE: Status: ACTIVE | Noted: 2024-11-01

## 2024-11-01 LAB
ALBUMIN SERPL-MCNC: 4.2 G/DL (ref 3.5–5.2)
ALP SERPL-CCNC: 63 U/L (ref 35–104)
ALT SERPL-CCNC: 14 U/L (ref 10–35)
ANION GAP SERPL CALCULATED.3IONS-SCNC: 17 MMOL/L (ref 9–16)
AST SERPL-CCNC: 33 U/L (ref 10–35)
BACTERIA URNS QL MICRO: ABNORMAL
BASOPHILS # BLD: 0.1 K/UL (ref 0–0.2)
BASOPHILS NFR BLD: 1 % (ref 0–2)
BILIRUB SERPL-MCNC: 0.4 MG/DL (ref 0–1.2)
BILIRUB UR QL STRIP: NEGATIVE
BUN SERPL-MCNC: 21 MG/DL (ref 8–23)
CALCIUM SERPL-MCNC: 10 MG/DL (ref 8.6–10.4)
CASTS #/AREA URNS LPF: ABNORMAL /LPF
CHLORIDE SERPL-SCNC: 101 MMOL/L (ref 98–107)
CLARITY UR: CLEAR
CO2 SERPL-SCNC: 17 MMOL/L (ref 20–31)
COLOR UR: YELLOW
CREAT SERPL-MCNC: 1.1 MG/DL (ref 0.7–1.2)
EKG ATRIAL RATE: 118 BPM
EKG P AXIS: 117 DEGREES
EKG P-R INTERVAL: 144 MS
EKG Q-T INTERVAL: 330 MS
EKG QRS DURATION: 80 MS
EKG QTC CALCULATION (BAZETT): 462 MS
EKG R AXIS: 134 DEGREES
EKG T AXIS: 126 DEGREES
EKG VENTRICULAR RATE: 118 BPM
EOSINOPHIL # BLD: 0 K/UL (ref 0–0.4)
EOSINOPHILS RELATIVE PERCENT: 1 % (ref 0–4)
EPI CELLS #/AREA URNS HPF: ABNORMAL /HPF
ERYTHROCYTE [DISTWIDTH] IN BLOOD BY AUTOMATED COUNT: 12.8 % (ref 11.5–14.9)
GFR, ESTIMATED: 56 ML/MIN/1.73M2
GLUCOSE SERPL-MCNC: 90 MG/DL (ref 74–99)
GLUCOSE UR STRIP-MCNC: NEGATIVE MG/DL
HCT VFR BLD AUTO: 35.2 % (ref 36–46)
HGB BLD-MCNC: 11.8 G/DL (ref 12–16)
HGB UR QL STRIP.AUTO: NEGATIVE
KETONES UR STRIP-MCNC: NEGATIVE MG/DL
LACTATE BLDV-SCNC: 2.6 MMOL/L (ref 0.5–1.9)
LACTATE BLDV-SCNC: 3.4 MMOL/L (ref 0.5–1.9)
LEUKOCYTE ESTERASE UR QL STRIP: NEGATIVE
LIPASE SERPL-CCNC: 71 U/L (ref 13–60)
LYMPHOCYTES NFR BLD: 1.5 K/UL (ref 1–4.8)
LYMPHOCYTES RELATIVE PERCENT: 25 % (ref 24–44)
MCH RBC QN AUTO: 36 PG (ref 26–34)
MCHC RBC AUTO-ENTMCNC: 33.6 G/DL (ref 31–37)
MCV RBC AUTO: 107.2 FL (ref 80–100)
MONOCYTES NFR BLD: 0.3 K/UL (ref 0.1–1.3)
MONOCYTES NFR BLD: 5 % (ref 1–7)
NEUTROPHILS NFR BLD: 68 % (ref 36–66)
NEUTS SEG NFR BLD: 4.3 K/UL (ref 1.3–9.1)
NITRITE UR QL STRIP: NEGATIVE
PH UR STRIP: 6.5 [PH] (ref 5–8)
PLATELET # BLD AUTO: 361 K/UL (ref 150–450)
PMV BLD AUTO: 7.5 FL (ref 6–12)
POTASSIUM SERPL-SCNC: 3.8 MMOL/L (ref 3.7–5.3)
PROT SERPL-MCNC: 8.5 G/DL (ref 6.6–8.7)
PROT UR STRIP-MCNC: ABNORMAL MG/DL
RBC # BLD AUTO: 3.29 M/UL (ref 4–5.2)
RBC #/AREA URNS HPF: ABNORMAL /HPF
SODIUM SERPL-SCNC: 135 MMOL/L (ref 136–145)
SP GR UR STRIP: 1.02 (ref 1–1.03)
TROPONIN I SERPL HS-MCNC: 31 NG/L (ref 0–14)
TROPONIN I SERPL HS-MCNC: 34 NG/L (ref 0–14)
UROBILINOGEN UR STRIP-ACNC: NORMAL EU/DL (ref 0–1)
WBC #/AREA URNS HPF: ABNORMAL /HPF
WBC OTHER # BLD: 6.3 K/UL (ref 3.5–11)

## 2024-11-01 PROCEDURE — 99285 EMERGENCY DEPT VISIT HI MDM: CPT

## 2024-11-01 PROCEDURE — 36415 COLL VENOUS BLD VENIPUNCTURE: CPT

## 2024-11-01 PROCEDURE — 83605 ASSAY OF LACTIC ACID: CPT

## 2024-11-01 PROCEDURE — 81001 URINALYSIS AUTO W/SCOPE: CPT

## 2024-11-01 PROCEDURE — 83735 ASSAY OF MAGNESIUM: CPT

## 2024-11-01 PROCEDURE — 84484 ASSAY OF TROPONIN QUANT: CPT

## 2024-11-01 PROCEDURE — 83690 ASSAY OF LIPASE: CPT

## 2024-11-01 PROCEDURE — 6360000004 HC RX CONTRAST MEDICATION

## 2024-11-01 PROCEDURE — 80053 COMPREHEN METABOLIC PANEL: CPT

## 2024-11-01 PROCEDURE — 6370000000 HC RX 637 (ALT 250 FOR IP)

## 2024-11-01 PROCEDURE — 6360000002 HC RX W HCPCS

## 2024-11-01 PROCEDURE — 1200000000 HC SEMI PRIVATE

## 2024-11-01 PROCEDURE — 74177 CT ABD & PELVIS W/CONTRAST: CPT

## 2024-11-01 PROCEDURE — 96374 THER/PROPH/DIAG INJ IV PUSH: CPT

## 2024-11-01 PROCEDURE — 87040 BLOOD CULTURE FOR BACTERIA: CPT

## 2024-11-01 PROCEDURE — 85025 COMPLETE CBC W/AUTO DIFF WBC: CPT

## 2024-11-01 PROCEDURE — 2580000003 HC RX 258: Performed by: NURSE PRACTITIONER

## 2024-11-01 PROCEDURE — 2580000003 HC RX 258

## 2024-11-01 RX ORDER — ACETAMINOPHEN 650 MG/1
650 SUPPOSITORY RECTAL EVERY 6 HOURS PRN
Status: DISCONTINUED | OUTPATIENT
Start: 2024-11-01 | End: 2024-11-04 | Stop reason: HOSPADM

## 2024-11-01 RX ORDER — DIAZEPAM 5 MG/1
5 TABLET ORAL ONCE
Status: COMPLETED | OUTPATIENT
Start: 2024-11-01 | End: 2024-11-01

## 2024-11-01 RX ORDER — DILTIAZEM HYDROCHLORIDE 240 MG/1
240 CAPSULE, COATED, EXTENDED RELEASE ORAL DAILY
Qty: 30 CAPSULE | Refills: 3 | OUTPATIENT
Start: 2024-11-01 | End: 2025-03-01

## 2024-11-01 RX ORDER — QUETIAPINE FUMARATE 25 MG/1
25 TABLET, FILM COATED ORAL NIGHTLY
Status: DISCONTINUED | OUTPATIENT
Start: 2024-11-02 | End: 2024-11-04 | Stop reason: HOSPADM

## 2024-11-01 RX ORDER — MAGNESIUM SULFATE HEPTAHYDRATE 40 MG/ML
2000 INJECTION, SOLUTION INTRAVENOUS PRN
Status: DISCONTINUED | OUTPATIENT
Start: 2024-11-01 | End: 2024-11-04 | Stop reason: HOSPADM

## 2024-11-01 RX ORDER — TAMSULOSIN HYDROCHLORIDE 0.4 MG/1
0.4 CAPSULE ORAL DAILY
Status: DISCONTINUED | OUTPATIENT
Start: 2024-11-02 | End: 2024-11-04 | Stop reason: HOSPADM

## 2024-11-01 RX ORDER — ONDANSETRON 4 MG/1
4 TABLET, ORALLY DISINTEGRATING ORAL EVERY 8 HOURS PRN
Status: DISCONTINUED | OUTPATIENT
Start: 2024-11-01 | End: 2024-11-04 | Stop reason: HOSPADM

## 2024-11-01 RX ORDER — ONDANSETRON 2 MG/ML
4 INJECTION INTRAMUSCULAR; INTRAVENOUS ONCE
Status: COMPLETED | OUTPATIENT
Start: 2024-11-01 | End: 2024-11-01

## 2024-11-01 RX ORDER — BISACODYL 10 MG
10 SUPPOSITORY, RECTAL RECTAL DAILY PRN
Status: DISCONTINUED | OUTPATIENT
Start: 2024-11-01 | End: 2024-11-04 | Stop reason: HOSPADM

## 2024-11-01 RX ORDER — SODIUM CHLORIDE 0.9 % (FLUSH) 0.9 %
5-40 SYRINGE (ML) INJECTION EVERY 12 HOURS SCHEDULED
Status: DISCONTINUED | OUTPATIENT
Start: 2024-11-01 | End: 2024-11-03 | Stop reason: SDUPTHER

## 2024-11-01 RX ORDER — ACETAMINOPHEN 325 MG/1
650 TABLET ORAL EVERY 6 HOURS PRN
Status: DISCONTINUED | OUTPATIENT
Start: 2024-11-01 | End: 2024-11-04 | Stop reason: HOSPADM

## 2024-11-01 RX ORDER — SODIUM CHLORIDE 0.9 % (FLUSH) 0.9 %
5-40 SYRINGE (ML) INJECTION PRN
Status: DISCONTINUED | OUTPATIENT
Start: 2024-11-01 | End: 2024-11-04 | Stop reason: HOSPADM

## 2024-11-01 RX ORDER — 0.9 % SODIUM CHLORIDE 0.9 %
30 INTRAVENOUS SOLUTION INTRAVENOUS ONCE
Status: COMPLETED | OUTPATIENT
Start: 2024-11-01 | End: 2024-11-01

## 2024-11-01 RX ORDER — IOPAMIDOL 755 MG/ML
75 INJECTION, SOLUTION INTRAVASCULAR
Status: COMPLETED | OUTPATIENT
Start: 2024-11-01 | End: 2024-11-01

## 2024-11-01 RX ORDER — DILTIAZEM HYDROCHLORIDE 120 MG/1
240 CAPSULE, COATED, EXTENDED RELEASE ORAL DAILY
Status: DISCONTINUED | OUTPATIENT
Start: 2024-11-02 | End: 2024-11-04 | Stop reason: HOSPADM

## 2024-11-01 RX ORDER — LANOLIN ALCOHOL/MO/W.PET/CERES
3 CREAM (GRAM) TOPICAL NIGHTLY PRN
Status: DISCONTINUED | OUTPATIENT
Start: 2024-11-01 | End: 2024-11-04 | Stop reason: HOSPADM

## 2024-11-01 RX ORDER — SODIUM CHLORIDE 9 MG/ML
INJECTION, SOLUTION INTRAVENOUS PRN
Status: DISCONTINUED | OUTPATIENT
Start: 2024-11-01 | End: 2024-11-04 | Stop reason: HOSPADM

## 2024-11-01 RX ORDER — POTASSIUM CHLORIDE 1500 MG/1
40 TABLET, EXTENDED RELEASE ORAL PRN
Status: DISCONTINUED | OUTPATIENT
Start: 2024-11-01 | End: 2024-11-04 | Stop reason: HOSPADM

## 2024-11-01 RX ORDER — ENOXAPARIN SODIUM 100 MG/ML
30 INJECTION SUBCUTANEOUS DAILY
Status: DISCONTINUED | OUTPATIENT
Start: 2024-11-02 | End: 2024-11-04 | Stop reason: HOSPADM

## 2024-11-01 RX ORDER — LEVOTHYROXINE SODIUM 100 UG/1
100 TABLET ORAL DAILY
Status: DISCONTINUED | OUTPATIENT
Start: 2024-11-02 | End: 2024-11-04 | Stop reason: HOSPADM

## 2024-11-01 RX ORDER — 0.9 % SODIUM CHLORIDE 0.9 %
100 INTRAVENOUS SOLUTION INTRAVENOUS ONCE
Status: COMPLETED | OUTPATIENT
Start: 2024-11-01 | End: 2024-11-01

## 2024-11-01 RX ORDER — SODIUM CHLORIDE 0.9 % (FLUSH) 0.9 %
10 SYRINGE (ML) INJECTION PRN
Status: DISCONTINUED | OUTPATIENT
Start: 2024-11-01 | End: 2024-11-03 | Stop reason: SDUPTHER

## 2024-11-01 RX ORDER — PANTOPRAZOLE SODIUM 40 MG/1
40 TABLET, DELAYED RELEASE ORAL DAILY
Status: DISCONTINUED | OUTPATIENT
Start: 2024-11-02 | End: 2024-11-04 | Stop reason: HOSPADM

## 2024-11-01 RX ORDER — SODIUM CHLORIDE 9 MG/ML
INJECTION, SOLUTION INTRAVENOUS CONTINUOUS
Status: DISCONTINUED | OUTPATIENT
Start: 2024-11-01 | End: 2024-11-02

## 2024-11-01 RX ORDER — POLYETHYLENE GLYCOL 3350 17 G/17G
17 POWDER, FOR SOLUTION ORAL DAILY PRN
Status: DISCONTINUED | OUTPATIENT
Start: 2024-11-01 | End: 2024-11-04 | Stop reason: HOSPADM

## 2024-11-01 RX ORDER — ONDANSETRON 2 MG/ML
4 INJECTION INTRAMUSCULAR; INTRAVENOUS EVERY 6 HOURS PRN
Status: DISCONTINUED | OUTPATIENT
Start: 2024-11-01 | End: 2024-11-04 | Stop reason: HOSPADM

## 2024-11-01 RX ORDER — SODIUM CHLORIDE 9 MG/ML
INJECTION, SOLUTION INTRAVENOUS PRN
Status: DISCONTINUED | OUTPATIENT
Start: 2024-11-01 | End: 2024-11-03 | Stop reason: SDUPTHER

## 2024-11-01 RX ORDER — SODIUM CHLORIDE 0.9 % (FLUSH) 0.9 %
10 SYRINGE (ML) INJECTION PRN
Status: DISCONTINUED | OUTPATIENT
Start: 2024-11-01 | End: 2024-11-04 | Stop reason: HOSPADM

## 2024-11-01 RX ORDER — SODIUM CHLORIDE 0.9 % (FLUSH) 0.9 %
5-40 SYRINGE (ML) INJECTION EVERY 12 HOURS SCHEDULED
Status: DISCONTINUED | OUTPATIENT
Start: 2024-11-01 | End: 2024-11-04 | Stop reason: HOSPADM

## 2024-11-01 RX ADMIN — SODIUM CHLORIDE, PRESERVATIVE FREE 10 ML: 5 INJECTION INTRAVENOUS at 20:19

## 2024-11-01 RX ADMIN — ONDANSETRON 4 MG: 2 INJECTION, SOLUTION INTRAMUSCULAR; INTRAVENOUS at 19:15

## 2024-11-01 RX ADMIN — IOPAMIDOL 75 ML: 755 INJECTION, SOLUTION INTRAVENOUS at 20:19

## 2024-11-01 RX ADMIN — SODIUM CHLORIDE 1497 ML: 9 INJECTION, SOLUTION INTRAVENOUS at 19:25

## 2024-11-01 RX ADMIN — DIAZEPAM 5 MG: 5 TABLET ORAL at 19:15

## 2024-11-01 RX ADMIN — SODIUM CHLORIDE 100 ML: 9 INJECTION, SOLUTION INTRAVENOUS at 20:19

## 2024-11-01 RX ADMIN — SODIUM CHLORIDE: 9 INJECTION, SOLUTION INTRAVENOUS at 23:25

## 2024-11-01 ASSESSMENT — LIFESTYLE VARIABLES
HOW MANY STANDARD DRINKS CONTAINING ALCOHOL DO YOU HAVE ON A TYPICAL DAY: 1 OR 2
HOW OFTEN DO YOU HAVE A DRINK CONTAINING ALCOHOL: 4 OR MORE TIMES A WEEK

## 2024-11-01 ASSESSMENT — PAIN - FUNCTIONAL ASSESSMENT: PAIN_FUNCTIONAL_ASSESSMENT: NONE - DENIES PAIN

## 2024-11-01 NOTE — ED NOTES
Report given to DENA Kolb from ED.   Report method in person.   The following was reviewed with receiving RN:   Current vital signs:  BP (!) 123/96   Pulse (!) 123   Temp 97.6 °F (36.4 °C) (Oral)   Resp 18   Wt 49.9 kg (110 lb)   SpO2 96%   BMI 20.80 kg/m²                MEWS Score: 3     Any medication or safety alerts were reviewed. Any pending diagnostics and notifications were also reviewed, as well as any safety concerns or issues, abnormal labs, abnormal imaging, and abnormal assessment findings. Questions were answered.

## 2024-11-02 LAB
ANION GAP SERPL CALCULATED.3IONS-SCNC: 11 MMOL/L (ref 9–16)
BASOPHILS # BLD: 0 K/UL (ref 0–0.2)
BASOPHILS NFR BLD: 1 % (ref 0–2)
BUN SERPL-MCNC: 19 MG/DL (ref 8–23)
CALCIUM SERPL-MCNC: 8.2 MG/DL (ref 8.6–10.4)
CHLORIDE SERPL-SCNC: 109 MMOL/L (ref 98–107)
CO2 SERPL-SCNC: 17 MMOL/L (ref 20–31)
CREAT SERPL-MCNC: 0.8 MG/DL (ref 0.7–1.2)
EOSINOPHIL # BLD: 0 K/UL (ref 0–0.4)
EOSINOPHILS RELATIVE PERCENT: 1 % (ref 0–4)
ERYTHROCYTE [DISTWIDTH] IN BLOOD BY AUTOMATED COUNT: 12.7 % (ref 11.5–14.9)
ETHANOL PERCENT: NORMAL %
ETHANOLAMINE SERPL-MCNC: <10 MG/DL (ref 0–0.08)
FOLATE SERPL-MCNC: >40 NG/ML (ref 4.8–24.2)
GFR, ESTIMATED: 83 ML/MIN/1.73M2
GLUCOSE SERPL-MCNC: 80 MG/DL (ref 74–99)
HCT VFR BLD AUTO: 28.6 % (ref 36–46)
HGB BLD-MCNC: 10.1 G/DL (ref 12–16)
LACTATE BLDV-SCNC: 0.6 MMOL/L (ref 0.5–2.2)
LACTATE BLDV-SCNC: 0.9 MMOL/L (ref 0.5–2.2)
LYMPHOCYTES NFR BLD: 1.1 K/UL (ref 1–4.8)
LYMPHOCYTES RELATIVE PERCENT: 29 % (ref 24–44)
MAGNESIUM SERPL-MCNC: 1.6 MG/DL (ref 1.6–2.4)
MAGNESIUM SERPL-MCNC: 2.9 MG/DL (ref 1.6–2.4)
MCH RBC QN AUTO: 37.8 PG (ref 26–34)
MCHC RBC AUTO-ENTMCNC: 35.4 G/DL (ref 31–37)
MCV RBC AUTO: 107 FL (ref 80–100)
MONOCYTES NFR BLD: 0.3 K/UL (ref 0.1–1.3)
MONOCYTES NFR BLD: 9 % (ref 1–7)
NEUTROPHILS NFR BLD: 60 % (ref 36–66)
NEUTS SEG NFR BLD: 2.3 K/UL (ref 1.3–9.1)
PLATELET # BLD AUTO: 238 K/UL (ref 150–450)
PMV BLD AUTO: 7.3 FL (ref 6–12)
POTASSIUM SERPL-SCNC: 3.5 MMOL/L (ref 3.7–5.3)
RBC # BLD AUTO: 2.67 M/UL (ref 4–5.2)
SODIUM SERPL-SCNC: 137 MMOL/L (ref 136–145)
TSH SERPL DL<=0.05 MIU/L-ACNC: 1.64 UIU/ML (ref 0.27–4.2)
VIT B12 SERPL-MCNC: 487 PG/ML (ref 232–1245)
WBC OTHER # BLD: 3.8 K/UL (ref 3.5–11)

## 2024-11-02 PROCEDURE — 6370000000 HC RX 637 (ALT 250 FOR IP): Performed by: INTERNAL MEDICINE

## 2024-11-02 PROCEDURE — 36415 COLL VENOUS BLD VENIPUNCTURE: CPT

## 2024-11-02 PROCEDURE — 84443 ASSAY THYROID STIM HORMONE: CPT

## 2024-11-02 PROCEDURE — 85025 COMPLETE CBC W/AUTO DIFF WBC: CPT

## 2024-11-02 PROCEDURE — 82746 ASSAY OF FOLIC ACID SERUM: CPT

## 2024-11-02 PROCEDURE — 83605 ASSAY OF LACTIC ACID: CPT

## 2024-11-02 PROCEDURE — 1200000000 HC SEMI PRIVATE

## 2024-11-02 PROCEDURE — 2500000003 HC RX 250 WO HCPCS: Performed by: NURSE PRACTITIONER

## 2024-11-02 PROCEDURE — 6370000000 HC RX 637 (ALT 250 FOR IP): Performed by: NURSE PRACTITIONER

## 2024-11-02 PROCEDURE — 2580000003 HC RX 258: Performed by: NURSE PRACTITIONER

## 2024-11-02 PROCEDURE — 80048 BASIC METABOLIC PNL TOTAL CA: CPT

## 2024-11-02 PROCEDURE — G0480 DRUG TEST DEF 1-7 CLASSES: HCPCS

## 2024-11-02 PROCEDURE — 83735 ASSAY OF MAGNESIUM: CPT

## 2024-11-02 PROCEDURE — 99223 1ST HOSP IP/OBS HIGH 75: CPT | Performed by: INTERNAL MEDICINE

## 2024-11-02 PROCEDURE — 82607 VITAMIN B-12: CPT

## 2024-11-02 PROCEDURE — 6360000002 HC RX W HCPCS: Performed by: NURSE PRACTITIONER

## 2024-11-02 RX ORDER — ROPINIROLE 0.5 MG/1
0.5 TABLET, FILM COATED ORAL NIGHTLY
Status: DISCONTINUED | OUTPATIENT
Start: 2024-11-02 | End: 2024-11-03

## 2024-11-02 RX ORDER — SODIUM CHLORIDE 0.9 % (FLUSH) 0.9 %
5-40 SYRINGE (ML) INJECTION EVERY 12 HOURS SCHEDULED
Status: DISCONTINUED | OUTPATIENT
Start: 2024-11-02 | End: 2024-11-03 | Stop reason: SDUPTHER

## 2024-11-02 RX ORDER — SODIUM CHLORIDE 0.9 % (FLUSH) 0.9 %
5-40 SYRINGE (ML) INJECTION PRN
Status: DISCONTINUED | OUTPATIENT
Start: 2024-11-02 | End: 2024-11-03 | Stop reason: SDUPTHER

## 2024-11-02 RX ORDER — MAGNESIUM SULFATE HEPTAHYDRATE 40 MG/ML
2000 INJECTION, SOLUTION INTRAVENOUS ONCE
Status: COMPLETED | OUTPATIENT
Start: 2024-11-02 | End: 2024-11-02

## 2024-11-02 RX ORDER — LORAZEPAM 1 MG/1
3 TABLET ORAL
Status: DISCONTINUED | OUTPATIENT
Start: 2024-11-02 | End: 2024-11-04 | Stop reason: HOSPADM

## 2024-11-02 RX ORDER — LORAZEPAM 1 MG/1
1 TABLET ORAL
Status: DISCONTINUED | OUTPATIENT
Start: 2024-11-02 | End: 2024-11-04 | Stop reason: HOSPADM

## 2024-11-02 RX ORDER — LORAZEPAM 1 MG/1
4 TABLET ORAL
Status: DISCONTINUED | OUTPATIENT
Start: 2024-11-02 | End: 2024-11-04 | Stop reason: HOSPADM

## 2024-11-02 RX ORDER — LORAZEPAM 2 MG/ML
3 INJECTION INTRAMUSCULAR
Status: DISCONTINUED | OUTPATIENT
Start: 2024-11-02 | End: 2024-11-04 | Stop reason: HOSPADM

## 2024-11-02 RX ORDER — M-VIT,TX,IRON,MINS/CALC/FOLIC 27MG-0.4MG
1 TABLET ORAL DAILY
Status: DISCONTINUED | OUTPATIENT
Start: 2024-11-02 | End: 2024-11-04 | Stop reason: HOSPADM

## 2024-11-02 RX ORDER — LORAZEPAM 2 MG/ML
2 INJECTION INTRAMUSCULAR
Status: DISCONTINUED | OUTPATIENT
Start: 2024-11-02 | End: 2024-11-04 | Stop reason: HOSPADM

## 2024-11-02 RX ORDER — GAUZE BANDAGE 2" X 2"
100 BANDAGE TOPICAL DAILY
Status: DISCONTINUED | OUTPATIENT
Start: 2024-11-02 | End: 2024-11-02 | Stop reason: SDUPTHER

## 2024-11-02 RX ORDER — LORAZEPAM 2 MG/ML
1 INJECTION INTRAMUSCULAR
Status: DISCONTINUED | OUTPATIENT
Start: 2024-11-02 | End: 2024-11-04 | Stop reason: HOSPADM

## 2024-11-02 RX ORDER — LORAZEPAM 1 MG/1
2 TABLET ORAL
Status: DISCONTINUED | OUTPATIENT
Start: 2024-11-02 | End: 2024-11-04 | Stop reason: HOSPADM

## 2024-11-02 RX ORDER — GAUZE BANDAGE 2" X 2"
100 BANDAGE TOPICAL DAILY
Status: DISCONTINUED | OUTPATIENT
Start: 2024-11-02 | End: 2024-11-04 | Stop reason: HOSPADM

## 2024-11-02 RX ORDER — ROPINIROLE 0.5 MG/1
0.5 TABLET, FILM COATED ORAL NIGHTLY
Status: DISCONTINUED | OUTPATIENT
Start: 2024-11-02 | End: 2024-11-02

## 2024-11-02 RX ORDER — LORAZEPAM 2 MG/ML
4 INJECTION INTRAMUSCULAR
Status: DISCONTINUED | OUTPATIENT
Start: 2024-11-02 | End: 2024-11-04 | Stop reason: HOSPADM

## 2024-11-02 RX ORDER — SODIUM CHLORIDE 9 MG/ML
INJECTION, SOLUTION INTRAVENOUS PRN
Status: DISCONTINUED | OUTPATIENT
Start: 2024-11-02 | End: 2024-11-03 | Stop reason: SDUPTHER

## 2024-11-02 RX ADMIN — QUETIAPINE FUMARATE 25 MG: 25 TABLET ORAL at 20:38

## 2024-11-02 RX ADMIN — Medication 3 MG: at 20:38

## 2024-11-02 RX ADMIN — Medication 1 TABLET: at 13:28

## 2024-11-02 RX ADMIN — TAMSULOSIN HYDROCHLORIDE 0.4 MG: 0.4 CAPSULE ORAL at 10:06

## 2024-11-02 RX ADMIN — ENOXAPARIN SODIUM 30 MG: 100 INJECTION SUBCUTANEOUS at 10:05

## 2024-11-02 RX ADMIN — MAGNESIUM SULFATE HEPTAHYDRATE 2000 MG: 40 INJECTION, SOLUTION INTRAVENOUS at 03:03

## 2024-11-02 RX ADMIN — DILTIAZEM HYDROCHLORIDE 240 MG: 120 CAPSULE, COATED, EXTENDED RELEASE ORAL at 10:05

## 2024-11-02 RX ADMIN — THIAMINE HCL TAB 100 MG 100 MG: 100 TAB at 13:27

## 2024-11-02 RX ADMIN — POTASSIUM CHLORIDE 40 MEQ: 1500 TABLET, EXTENDED RELEASE ORAL at 11:32

## 2024-11-02 RX ADMIN — ROPINIROLE HYDROCHLORIDE 0.5 MG: 0.5 TABLET, FILM COATED ORAL at 17:17

## 2024-11-02 RX ADMIN — SODIUM CHLORIDE, PRESERVATIVE FREE 10 ML: 5 INJECTION INTRAVENOUS at 20:38

## 2024-11-02 RX ADMIN — SODIUM CHLORIDE, PRESERVATIVE FREE 10 ML: 5 INJECTION INTRAVENOUS at 10:05

## 2024-11-02 RX ADMIN — LEVOTHYROXINE SODIUM 100 MCG: 0.1 TABLET ORAL at 05:56

## 2024-11-02 RX ADMIN — QUETIAPINE FUMARATE 25 MG: 25 TABLET ORAL at 00:24

## 2024-11-02 RX ADMIN — PANTOPRAZOLE SODIUM 40 MG: 40 TABLET, DELAYED RELEASE ORAL at 10:06

## 2024-11-02 RX ADMIN — ROPINIROLE HYDROCHLORIDE 0.5 MG: 0.5 TABLET, FILM COATED ORAL at 20:38

## 2024-11-02 NOTE — ED NOTES
Report given to DENA Crenshaw from [facility].   Report method by phone   The following was reviewed with receiving RN:   Current vital signs:  /87   Pulse (!) 103   Temp 98.2 °F (36.8 °C)   Resp 22   Wt 49.9 kg (110 lb)   SpO2 97%   BMI 20.80 kg/m²                MEWS Score: 3     Any medication or safety alerts were reviewed. Any pending diagnostics and notifications were also reviewed, as well as any safety concerns or issues, abnormal labs, abnormal imaging, and abnormal assessment findings. Questions were answered.

## 2024-11-02 NOTE — FLOWSHEET NOTE
11/02/24 1345   Treatment Team Notification   Reason for Communication Review case  (new consult)   Name of Team Member Notified Shameka Morrow   Treatment Team Role Advanced Practice Nurse   Method of Communication Secure Message   Response No new orders   Notification Time 1341

## 2024-11-02 NOTE — ED PROVIDER NOTES
[facility]  Emergency Department Encounter  Emergency Medicine Resident     Pt Name:Rajani Iglesias  MRN: [id number]  Birthdate 1960  Date of evaluation: 11/2/24  PCP:  Kailey Pepe MD  Note Started: 12:52 AM EDT      CHIEF COMPLAINT       Chief Complaint   Patient presents with    Urinary Frequency    Leg Pain       HISTORY OF PRESENT ILLNESS  (Location/Symptom, Timing/Onset, Context/Setting, Quality, Duration, Modifying Factors, Severity.)      Rajani Iglesias is a 64 y.o. female who presents with abdominal pain, suprapubic fullness, difficulty urinating and leg pain.  Patient currently resides at [facility] nursing care facility when she began to have dysuria and subjective fever/chills.  Patient has a colostomy bag in place from August 2024 after being diagnosed with a bowel obstruction.  Patient also complains of experiencing fatigue and not being able to tolerate regular diet.  Patient has been experiencing bilateral lower extremity paresthesias particularly at nighttime which have been disturbing her sleep pattern.  Patient denies any emesis, difficulties with bowel movements, chest pain, shortness of breath.    PAST MEDICAL / SURGICAL / SOCIAL / FAMILY HISTORY      has a past medical history of Anxiety, Arthritis, At maximum risk for fall, Breast cancer (HCC), Brief psychotic disorder (HCC), Cancer (HCC), Cauda equina syndrome (HCC), Cerebrovascular accident (HCC), Cervical stenosis of spine, GERD (gastroesophageal reflux disease), History of stomach ulcers, History of therapeutic radiation, Hx antineoplastic chemo, Hypertension, Hypothyroidism, Lumbar neuritis, NSTEMI (non-ST elevated myocardial infarction) (HCC), Post laminectomy syndrome, Spinal deformity, Thyroid disease, Uses wheelchair, Wears dentures, and Wellness examination.       has a past surgical history that includes hernia repair (Bilateral); Breast surgery (Right); Mastectomy, radical; Hysterectomy, total abdominal; Lumbar      Feeling of Stress : Only a little   Social Connections: Somewhat Isolated (11/2/2024)    Social Connections ([facility] HRSN)     If for any reason you need help with day-to-day activities such as bathing, preparing meals, shopping, managing finances, etc., do you get the help you need?: Not on file   Intimate Partner Violence: Not on file   Housing Stability: Low Risk  (11/1/2024)    Housing Stability Vital Sign     Unable to Pay for Housing in the Last Year: No     Number of Times Moved in the Last Year: 0     Homeless in the Last Year: No       Family History   Problem Relation Age of Onset    Hypertension Mother     Heart Disease Mother     Cancer Mother        Allergies:  Latex, Buspar [buspirone], Kiwi extract, Crab extract, and Other    Home Medications:  Prior to Admission medications    Medication Sig Start Date End Date Taking? Authorizing Provider   dilTIAZem (CARDIZEM CD) 240 MG extended release capsule Take 1 capsule by mouth daily 10/28/24 2/25/25 Yes Bhavesh Bryan PA   NONFORMULARY Take by mouth 3 times daily THC gummies   Yes Provider, MD Robert   QUEtiapine (SEROQUEL) 25 MG tablet Take 1 tablet by mouth nightly 9/10/24  Yes Kailey Pepe MD   atorvastatin (LIPITOR) 40 MG tablet TAKE ONE TABLET BY MOUTH TWICE A WEEK 8/26/24  Yes Bhavesh Bryan PA   levothyroxine (SYNTHROID) 100 MCG tablet Take 1 tablet by mouth daily 7/24/24  Yes Giovanna Brasher MD   tamsulosin (FLOMAX) 0.4 MG capsule Take 1 capsule by mouth daily 7/7/24  Yes Saad Bustillo DO   pantoprazole (PROTONIX) 40 MG tablet Take 1 tablet by mouth daily 6/20/24  Yes Frankie Sosa DO   folic acid (FOLVITE) 1 MG tablet TAKE 1 TABLET BY MOUTH DAILY 6/18/24  Yes Frankie Sosa DO   vitamin D3 (CHOLECALCIFEROL) 25 MCG (1000 UT) TABS tablet Take 2 tablets by mouth daily 2/16/24  Yes Giovanna Brasher MD   zoledronic acid (RECLAST) 5 MG/100ML SOLN Infuse 100 mLs intravenously once for 1 dose 2/16/24 6/20/25  Giovanna Brasher MD

## 2024-11-02 NOTE — DISCHARGE INSTR - COC
Continuity of Care Form    Patient Name: Rajani Iglesias   :  1960  MRN:  [id number]    Admit date:  2024  Discharge date:  24    Code Status Order: Full Code   Advance Directives:   Advance Care Flowsheet Documentation             Admitting Physician:  Damaris Mixon MD  PCP: Kailey Pepe MD    Discharging Nurse: ***  Discharging Hospital Unit/Room#: 2044/2044-01  Discharging Unit Phone Number: ***    Emergency Contact:   Extended Emergency Contact Information  Primary Emergency Contact: LG QUINTERO  Home Phone: [phone]  Work Phone: [phone]  Mobile Phone: [phone]  Relation: Spouse  Preferred language: English  Secondary Emergency Contact: Mary Tejeda  Home Phone: [phone]  Relation: Child    Past Surgical History:  Past Surgical History:   Procedure Laterality Date    BACK SURGERY      lower back x 3, cervical- x 1: C4- C6, 2014     BREAST SURGERY Right     mastectomy with reconstruction    CERVICAL FUSION N/A 2022    C5 CORPECTOMY, USE OF INTRAOPERATIVE CERVICAL TRACTION performed by Linda Ludwig DO at [facility] OR    CERVICAL FUSION N/A 2022    POSTERIOR FIXATION C2-C7 performed by Linda Ludwig DO at [facility] OR    COLONOSCOPY  about 2018    HERNIA REPAIR Bilateral     inguinal    HYSTERECTOMY, TOTAL ABDOMINAL (CERVIX REMOVED)      LUMBAR SPINE SURGERY N/A 2021    LUMBAR LAMINECTOMY L2-S1 performed by Linda Ludwig DO at [facility] OR    MASTECTOMY, RADICAL      OTHER SURGICAL HISTORY  2022    C5 CORPECTOMY, USE OF INTRAOPERATIVE CERVICAL TRACTION (N/A Spine Cervical)     SIGMOID COLECTOMY N/A 2024    LAPAROTOMY EXPLORATORY W/COLECTOMY, ILEOSTOMY RIGHT LOWER QUADRANT performed by Saad Bustillo DO at [facility] OR       Immunization History:   Immunization History   Administered Date(s) Administered    COVID-19, MODERNA BLUE border, Primary or Immunocompromised, (age 12y+), IM, 100 mcg/0.5mL 2021, 2021, 12/15/2021    COVID-19, MODERNA  Risk Assessment Score:  Readmission Risk              Risk of Unplanned Readmission:  32           Discharging to Facility/ Agency   [facility]  [address] [place] [address]  P: [phone]   F: [phone]    Dialysis Facility (if applicable)   Name:  Address:  Dialysis Schedule:  Phone:  Fax:    / signature: Electronically signed by Fariba Dutta RN on 11/2/24 at 12:59 PM EDT    PHYSICIAN SECTION    Prognosis: Good    Condition at Discharge: Stable    Rehab Potential (if transferring to Rehab): Good    Recommended Labs or Other Treatments After Discharge:     Physician Certification: I certify the above information and transfer of Rajani Iglesias  is necessary for the continuing treatment of the diagnosis listed and that she requires Home Care for less 30 days.     Update Admission H&P: No change in H&P    PHYSICIAN SIGNATURE:  Electronically signed by Damaris Mixon MD on 11/4/24 at 9:30 AM EST

## 2024-11-02 NOTE — PLAN OF CARE
Problem: Chronic Conditions and Co-morbidities  Goal: Patient's chronic conditions and co-morbidity symptoms are monitored and maintained or improved  11/2/2024 1512 by Milady Sarabia RN  Outcome: Progressing     Problem: Discharge Planning  Goal: Discharge to home or other facility with appropriate resources  11/2/2024 1512 by Milady Sarabia RN  Outcome: Progressing     Problem: Skin/Tissue Integrity  Goal: Absence of new skin breakdown  Description: 1.  Monitor for areas of redness and/or skin breakdown  2.  Assess vascular access sites hourly  3.  Every 4-6 hours minimum:  Change oxygen saturation probe site  4.  Every 4-6 hours:  If on nasal continuous positive airway pressure, respiratory therapy assess nares and determine need for appliance change or resting period.  11/2/2024 1512 by Milady Sarabia RN  Outcome: Progressing     Problem: Safety - Adult  Goal: Free from fall injury  11/2/2024 1512 by Milady Sarabia RN  Outcome: Progressing     Problem: ABCDS Injury Assessment  Goal: Absence of physical injury  11/2/2024 1512 by Milady Sarabia RN  Outcome: Progressing

## 2024-11-02 NOTE — H&P
[facility]   IN-PATIENT SERVICE   [facility]    HISTORY AND PHYSICAL EXAMINATION            Date:   11/2/2024  Patient name:  Rajani Iglesias  Date of admission:  11/1/2024  6:41 PM  MRN:   [id number]  Account:  [id number]  YOB: 1960  PCP:    Kailey Pepe MD  Room:   2044/2044-  Code Status:    Full Code    Chief Complaint:     Chief Complaint   Patient presents with    Urinary Frequency    Leg Pain       History Obtained From:     patient    History of Present Illness:     The patient is a 64 y.o.  Non- / non  female who presents with Urinary Frequency and Leg Pain   and she is admitted to the hospital for the management of      Rajani Iglesias is a 64 y.o. Non- / non  female who presents with Urinary Frequency and Leg Pain   and is admitted to the hospital for the management of Pancreatitis, unspecified pancreatitis type.     Patient's medical history significant for cauda equina syndrome, diastolic heart failure, history of a stroke, ileostomy, and postlaminectomy syndrome.     According to patient, she came to the ED today to get help for her restless legs.  States that she cannot stop moving her legs and it is keeping her from being able to rest.  States that she does not currently take any medications to help this.  States that he is concerned it is related to her past history of neck surgery.  Patient has mild epigastric tenderness to palpation and reports poor p.o. intake today due to nausea and 2 bouts of emesis.     Patient tachycardic on arrival and code sepsis was initiated.  WBC 6.3.  Lactic acid 3.4, then 2.6.  UA negative for UTI.  Blood cultures pending x 2.  CT abdomen/pelvis shows inflammatory fat stranding noted about the pancreatic tail consistent with acute interstitial pancreatitis.  Recommend correlation with serum amylase and lipase values.  Lipase 71.  Patient reports past history of pancreatitis, stating she was  2/16/24  Yes Giovanna Brasher MD   zoledronic acid (RECLAST) 5 MG/100ML SOLN Infuse 100 mLs intravenously once for 1 dose 2/16/24 6/20/25  Giovanna Brasher MD   Handicap Placard MISC by Does not apply route Cannot walk 200 feet due to medical issues  Expires 7/14/2027 7/15/22   Giovanna Brasher MD        Allergies:     Latex, Buspar [buspirone], Kiwi extract, Crab extract, and Other    Social History:     Tobacco:    reports that she quit smoking about 10 years ago. Her smoking use included cigarettes. She started smoking about 40 years ago. She has a 15 pack-year smoking history. She has never used smokeless tobacco.  Alcohol:      reports current alcohol use.  Drug Use:  reports current drug use. Drug: Prescription.    Family History:     Family History   Problem Relation Age of Onset    Hypertension Mother     Heart Disease Mother     Cancer Mother        Review of Systems:     Positive and Negative as described in HPI.    CONSTITUTIONAL:  negative for fevers, chills, sweats, fatigue, weight loss  HEENT:  negative for vision, hearing changes, runny nose, throat pain  RESPIRATORY:  negative for shortness of breath, cough, congestion, wheezing.  CARDIOVASCULAR:  negative for chest pain, palpitations.  GASTROINTESTINAL:  negative for nausea, vomiting, diarrhea, constipation, change in bowel habits, abdominal pain   GENITOURINARY:  negative for difficulty of urination, burning with urination, frequency   INTEGUMENT:  negative for rash, skin lesions, easy bruising   HEMATOLOGIC/LYMPHATIC:  negative for swelling/edema   ALLERGIC/IMMUNOLOGIC:  negative for urticaria , itching  ENDOCRINE:  negative increase in drinking, increase in urination, hot or cold intolerance  MUSCULOSKELETAL:  negative joint pains, muscle aches, swelling of joints  NEUROLOGICAL:  negative for headaches, dizziness, lightheadedness, numbness, pain, tingling extremities  BEHAVIOR/PSYCH:  negative for depression, anxiety    Physical Exam:   BP (!)

## 2024-11-02 NOTE — PLAN OF CARE
Problem: Chronic Conditions and Co-morbidities  Goal: Patient's chronic conditions and co-morbidity symptoms are monitored and maintained or improved  Outcome: Progressing  Flowsheets (Taken 11/2/2024 0458)  Care Plan - Patient's Chronic Conditions and Co-Morbidity Symptoms are Monitored and Maintained or Improved:   Monitor and assess patient's chronic conditions and comorbid symptoms for stability, deterioration, or improvement   Collaborate with multidisciplinary team to address chronic and comorbid conditions and prevent exacerbation or deterioration   Update acute care plan with appropriate goals if chronic or comorbid symptoms are exacerbated and prevent overall improvement and discharge     Problem: Discharge Planning  Goal: Discharge to home or other facility with appropriate resources  Outcome: Progressing  Flowsheets (Taken 11/2/2024 0458)  Discharge to home or other facility with appropriate resources:   Identify barriers to discharge with patient and caregiver   Arrange for needed discharge resources and transportation as appropriate   Identify discharge learning needs (meds, wound care, etc)   Arrange for interpreters to assist at discharge as needed   Refer to discharge planning if patient needs post-hospital services based on physician order or complex needs related to functional status, cognitive ability or social support system     Problem: Skin/Tissue Integrity  Goal: Absence of new skin breakdown  Description: 1.  Monitor for areas of redness and/or skin breakdown  2.  Assess vascular access sites hourly  3.  Every 4-6 hours minimum:  Change oxygen saturation probe site  4.  Every 4-6 hours:  If on nasal continuous positive airway pressure, respiratory therapy assess nares and determine need for appliance change or resting period.  Outcome: Progressing  Note: Monitoring skin     Problem: Safety - Adult  Goal: Free from fall injury  Outcome: Progressing  Flowsheets (Taken 11/2/2024 0458)  Free  From Fall Injury: Instruct family/caregiver on patient safety     Problem: ABCDS Injury Assessment  Goal: Absence of physical injury  Outcome: Progressing  Flowsheets (Taken 11/2/2024 2058)  Absence of Physical Injury: Implement safety measures based on patient assessment

## 2024-11-02 NOTE — PROGRESS NOTES
[facility] Internal Medicine  Oscar Bueno MD; Juan Miguel Heller MD; Surendra Little MD; MD Yanna Portillo MD; Urvashi Meredith MD  [facility]   IN-PATIENT SERVICE  [facility]                 Date:   11/2/2024  Patientname:  Rajani Iglesias  Date of admission:  11/1/2024  6:41 PM  MRN:   [id number]  Account:  [id number]  YOB: 1960  PCP:    aKiley Pepe MD  Room:   2044/2044-01  Code Status:    Full Code      Chief Complaint:     Chief Complaint   Patient presents with    Urinary Frequency    Leg Pain       History of Present Illness:     Rajani Iglesias is a 64 y.o. Non- / non  female who presents with Urinary Frequency and Leg Pain   and is admitted to the hospital for the management of Pancreatitis, unspecified pancreatitis type.    Patient's medical history significant for cauda equina syndrome, diastolic heart failure, history of a stroke, ileostomy, and postlaminectomy syndrome.    According to patient, she came to the ED today to get help for her restless legs.  States that she cannot stop moving her legs and it is keeping her from being able to rest.  States that she does not currently take any medications to help this.  States that he is concerned it is related to her past history of neck surgery.  Patient has mild epigastric tenderness to palpation and reports poor p.o. intake today due to nausea and 2 bouts of emesis.    Patient tachycardic on arrival and code sepsis was initiated.  WBC 6.3.  Lactic acid 3.4, then 2.6.  UA negative for UTI.  Blood cultures pending x 2.  CT abdomen/pelvis shows inflammatory fat stranding noted about the pancreatic tail consistent with acute interstitial pancreatitis.  Recommend correlation with serum amylase and lipase values.  Lipase 71.  Patient reports past history of pancreatitis, stating she was hospitalized about a month ago at [facility].  Will continue to trend lactic acid until

## 2024-11-03 PROBLEM — K85.00 IDIOPATHIC ACUTE PANCREATITIS WITHOUT INFECTION OR NECROSIS: Status: ACTIVE | Noted: 2024-11-03

## 2024-11-03 PROBLEM — E87.20 LACTIC ACID ACIDOSIS: Status: ACTIVE | Noted: 2024-11-03

## 2024-11-03 LAB
ANION GAP SERPL CALCULATED.3IONS-SCNC: 12 MMOL/L (ref 9–16)
BASOPHILS # BLD: 0 K/UL (ref 0–0.2)
BASOPHILS NFR BLD: 1 % (ref 0–2)
BUN SERPL-MCNC: 14 MG/DL (ref 8–23)
CALCIUM SERPL-MCNC: 8 MG/DL (ref 8.6–10.4)
CHLORIDE SERPL-SCNC: 106 MMOL/L (ref 98–107)
CO2 SERPL-SCNC: 16 MMOL/L (ref 20–31)
CREAT SERPL-MCNC: 0.7 MG/DL (ref 0.7–1.2)
EOSINOPHIL # BLD: 0.1 K/UL (ref 0–0.4)
EOSINOPHILS RELATIVE PERCENT: 2 % (ref 0–4)
ERYTHROCYTE [DISTWIDTH] IN BLOOD BY AUTOMATED COUNT: 12.7 % (ref 11.5–14.9)
FERRITIN SERPL-MCNC: 93 NG/ML
GFR, ESTIMATED: >90 ML/MIN/1.73M2
GLUCOSE SERPL-MCNC: 101 MG/DL (ref 74–99)
HCT VFR BLD AUTO: 28.7 % (ref 36–46)
HGB BLD-MCNC: 10 G/DL (ref 12–16)
IRON SATN MFR SERPL: 23 % (ref 20–55)
IRON SERPL-MCNC: 61 UG/DL (ref 37–145)
LYMPHOCYTES NFR BLD: 0.8 K/UL (ref 1–4.8)
LYMPHOCYTES RELATIVE PERCENT: 17 % (ref 24–44)
MCH RBC QN AUTO: 37.3 PG (ref 26–34)
MCHC RBC AUTO-ENTMCNC: 34.8 G/DL (ref 31–37)
MCV RBC AUTO: 107.2 FL (ref 80–100)
MONOCYTES NFR BLD: 0.3 K/UL (ref 0.1–1.3)
MONOCYTES NFR BLD: 7 % (ref 1–7)
NEUTROPHILS NFR BLD: 73 % (ref 36–66)
NEUTS SEG NFR BLD: 3.6 K/UL (ref 1.3–9.1)
PLATELET # BLD AUTO: 258 K/UL (ref 150–450)
PMV BLD AUTO: 7.3 FL (ref 6–12)
POTASSIUM SERPL-SCNC: 3.6 MMOL/L (ref 3.7–5.3)
RBC # BLD AUTO: 2.68 M/UL (ref 4–5.2)
SODIUM SERPL-SCNC: 134 MMOL/L (ref 136–145)
TIBC SERPL-MCNC: 263 UG/DL (ref 250–450)
TRIGL SERPL-MCNC: 123 MG/DL (ref 0–149)
UNSATURATED IRON BINDING CAPACITY: 202 UG/DL (ref 112–347)
WBC OTHER # BLD: 4.8 K/UL (ref 3.5–11)

## 2024-11-03 PROCEDURE — 83550 IRON BINDING TEST: CPT

## 2024-11-03 PROCEDURE — 97530 THERAPEUTIC ACTIVITIES: CPT

## 2024-11-03 PROCEDURE — 97166 OT EVAL MOD COMPLEX 45 MIN: CPT

## 2024-11-03 PROCEDURE — 97162 PT EVAL MOD COMPLEX 30 MIN: CPT

## 2024-11-03 PROCEDURE — 80048 BASIC METABOLIC PNL TOTAL CA: CPT

## 2024-11-03 PROCEDURE — 1200000000 HC SEMI PRIVATE

## 2024-11-03 PROCEDURE — 2580000003 HC RX 258

## 2024-11-03 PROCEDURE — 83540 ASSAY OF IRON: CPT

## 2024-11-03 PROCEDURE — 99233 SBSQ HOSP IP/OBS HIGH 50: CPT | Performed by: INTERNAL MEDICINE

## 2024-11-03 PROCEDURE — 82787 IGG 1 2 3 OR 4 EACH: CPT

## 2024-11-03 PROCEDURE — 82728 ASSAY OF FERRITIN: CPT

## 2024-11-03 PROCEDURE — 36415 COLL VENOUS BLD VENIPUNCTURE: CPT

## 2024-11-03 PROCEDURE — APPNB30 APP NON BILLABLE TIME 0-30 MINS: Performed by: NURSE PRACTITIONER

## 2024-11-03 PROCEDURE — 2500000003 HC RX 250 WO HCPCS: Performed by: INTERNAL MEDICINE

## 2024-11-03 PROCEDURE — 99232 SBSQ HOSP IP/OBS MODERATE 35: CPT | Performed by: STUDENT IN AN ORGANIZED HEALTH CARE EDUCATION/TRAINING PROGRAM

## 2024-11-03 PROCEDURE — 2580000003 HC RX 258: Performed by: INTERNAL MEDICINE

## 2024-11-03 PROCEDURE — 84478 ASSAY OF TRIGLYCERIDES: CPT

## 2024-11-03 PROCEDURE — 97116 GAIT TRAINING THERAPY: CPT

## 2024-11-03 PROCEDURE — 6370000000 HC RX 637 (ALT 250 FOR IP): Performed by: INTERNAL MEDICINE

## 2024-11-03 PROCEDURE — 85025 COMPLETE CBC W/AUTO DIFF WBC: CPT

## 2024-11-03 PROCEDURE — 6360000002 HC RX W HCPCS: Performed by: NURSE PRACTITIONER

## 2024-11-03 PROCEDURE — 6370000000 HC RX 637 (ALT 250 FOR IP): Performed by: NURSE PRACTITIONER

## 2024-11-03 RX ORDER — SODIUM BICARBONATE 650 MG/1
650 TABLET ORAL 4 TIMES DAILY
Status: DISCONTINUED | OUTPATIENT
Start: 2024-11-03 | End: 2024-11-04 | Stop reason: HOSPADM

## 2024-11-03 RX ORDER — ROPINIROLE 0.5 MG/1
1 TABLET, FILM COATED ORAL NIGHTLY
Status: DISCONTINUED | OUTPATIENT
Start: 2024-11-03 | End: 2024-11-04 | Stop reason: HOSPADM

## 2024-11-03 RX ADMIN — DILTIAZEM HYDROCHLORIDE 240 MG: 120 CAPSULE, COATED, EXTENDED RELEASE ORAL at 07:54

## 2024-11-03 RX ADMIN — SODIUM BICARBONATE 650 MG: 650 TABLET ORAL at 16:44

## 2024-11-03 RX ADMIN — LEVOTHYROXINE SODIUM 100 MCG: 0.1 TABLET ORAL at 05:46

## 2024-11-03 RX ADMIN — SODIUM BICARBONATE 650 MG: 650 TABLET ORAL at 21:09

## 2024-11-03 RX ADMIN — Medication 3 MG: at 21:09

## 2024-11-03 RX ADMIN — SODIUM CHLORIDE, PRESERVATIVE FREE 10 ML: 5 INJECTION INTRAVENOUS at 20:38

## 2024-11-03 RX ADMIN — ROPINIROLE HYDROCHLORIDE 1 MG: 0.5 TABLET, FILM COATED ORAL at 21:09

## 2024-11-03 RX ADMIN — Medication 1 TABLET: at 07:54

## 2024-11-03 RX ADMIN — QUETIAPINE FUMARATE 25 MG: 25 TABLET ORAL at 21:09

## 2024-11-03 RX ADMIN — ENOXAPARIN SODIUM 30 MG: 100 INJECTION SUBCUTANEOUS at 07:54

## 2024-11-03 RX ADMIN — TAMSULOSIN HYDROCHLORIDE 0.4 MG: 0.4 CAPSULE ORAL at 07:53

## 2024-11-03 RX ADMIN — THIAMINE HCL TAB 100 MG 100 MG: 100 TAB at 07:53

## 2024-11-03 RX ADMIN — PANTOPRAZOLE SODIUM 40 MG: 40 TABLET, DELAYED RELEASE ORAL at 07:54

## 2024-11-03 RX ADMIN — SODIUM BICARBONATE: 84 INJECTION, SOLUTION INTRAVENOUS at 13:46

## 2024-11-03 NOTE — PROGRESS NOTES
[facility]   Occupational Therapy Evaluation  Date: 11/3/24  Patient Name: Rajani Iglesias       Room: -  MRN: [id number]  Account: [id number]   : 1960  (64 y.o.) Gender: female     Discharge Recommendations:  The patient may need non-skilled ADL assistance after discharge.     OT Equipment Recommendations  Other: TBD    Referring Practitioner: Damaris Mixon MD  Diagnosis: Pancreatitis, unspecified pancreatitis type     Treatment Diagnosis: Impaired self-care status    Past Medical History:  has a past medical history of Anxiety, Arthritis, At maximum risk for fall, Breast cancer (HCC), Brief psychotic disorder (HCC), Cancer (HCC), Cauda equina syndrome (HCC), Cerebrovascular accident (HCC), Cervical stenosis of spine, GERD (gastroesophageal reflux disease), History of stomach ulcers, History of therapeutic radiation, Hx antineoplastic chemo, Hypertension, Hypothyroidism, Lumbar neuritis, NSTEMI (non-ST elevated myocardial infarction) (HCC), Post laminectomy syndrome, Spinal deformity, Thyroid disease, Uses wheelchair, Wears dentures, and Wellness examination.    Past Surgical History:   has a past surgical history that includes hernia repair (Bilateral); Breast surgery (Right); Mastectomy, radical; Hysterectomy, total abdominal; Lumbar spine surgery (N/A, 2021); back surgery; Colonoscopy (about ); other surgical history (2022); cervical fusion (N/A, 2022); cervical fusion (N/A, 2022); and Sigmoid Colectomy (N/A, 2024).    Restrictions  Restrictions/Precautions  Restrictions/Precautions: Fall Risk, Up as Tolerated, Modified Diet  Required Braces or Orthoses?: No  Implants present? : Metal implants (Titanium in neck)      Subjective  Subjective: \"I just need a little pill and then I'll be fine\" pt pleasant and agreeable to OT/PT eval  Comments: Okay for OT/PT eval per DENA Izquierdo  Subjective  Pain: 8/10 pain in low back and bilat legs, abdomen when  clinical judgement  LE Dressing: Minimal assistance, Based on clinical judgement  Putting On/Taking Off Footwear: Minimal assistance  Putting On/Taking Off Footwear Skilled Clinical Factors: Able to don socks with figure four technique, 1 LOB posteriorly requiring min A to correct  Toileting: Minimal assistance, Based on clinical judgement  Additional Comments: ADL scores are based on skilled observation unless otherwise noted. Pt is currently limited by decreased strength, endurance, activity tolerance, impaired balance, and increased pain which impacts the pt's ability to safely and independently complete self-care tasks    UE Function  LUE AROM (degrees)  LUE AROM : WFL  Left Hand AROM (degrees)  Left Hand AROM: WFL  Tone LUE  LUE Tone: Normotonic  LUE Strength  Gross LUE Strength: WFL  L Hand General: 4/5  LUE Strength Comment: Grossly 4/5    RUE AROM (degrees)  RUE AROM : WFL  Right Hand AROM (degrees)  Right Hand AROM: WFL  Tone RUE  RUE Tone: Normotonic  RUE Strength  Gross RUE Strength: WFL  R Hand General: 4/5  RUE Strength Comment: Grossly 4/5    Fine Motor Skills/Coordination  Coordination  Movements Are Fluid And Coordinated: No  Coordination and Movement Description: Fine motor impairments, Right UE, Left UE     Bed Mobility  Bed mobility  Supine to Sit: Stand by assistance  Sit to Supine: Unable to assess (Pt left up in chair)  Scooting: Stand by assistance  Bed Mobility Comments: HOB elevated    Balance  Balance  Sitting Balance: Supervision  Standing Balance: Stand by assistance  Standing Balance  Time: 3-4 minutes  Activity: Functional transfers/mobility  Comment: BUE supported    Transfers  Transfers  Sit to stand: Stand by assistance  Stand to sit: Stand by assistance  Transfer Comments: Cues for hand placement with good carryover    Functional Mobility  Functional - Mobility Device: Rolling Walker  Activity: Other (In room, into hallway)  Assist Level: Stand by assistance  Functional Mobility

## 2024-11-03 NOTE — PROGRESS NOTES
[facility]   IN-PATIENT SERVICE   [facility]  Progress note Date:   11/3/2024  Patient name:  Rajani Iglesias  Date of admission:  11/1/2024  6:41 PM  MRN:   [id number]  Account:  [id number]  YOB: 1960  PCP:    Kailey Pepe MD  Room:   2044/2044-01  Code Status:    Full Code    Chief Complaint:     Chief Complaint   Patient presents with    Urinary Frequency    Leg Pain       History Obtained From:     patient    History of Present Illness:     The patient is a 64 y.o.  Non- / non  female who presents with Urinary Frequency and Leg Pain   and she is admitted to the hospital for the management of      Rajani Iglesias is a 64 y.o. Non- / non  female who presents with Urinary Frequency and Leg Pain   and is admitted to the hospital for the management of Pancreatitis, unspecified pancreatitis type.     Patient's medical history significant for cauda equina syndrome, diastolic heart failure, history of a stroke, ileostomy, and postlaminectomy syndrome.     According to patient, she came to the ED today to get help for her restless legs.  States that she cannot stop moving her legs and it is keeping her from being able to rest.  States that she does not currently take any medications to help this.  States that he is concerned it is related to her past history of neck surgery.  Patient has mild epigastric tenderness to palpation and reports poor p.o. intake today due to nausea and 2 bouts of emesis.     Patient tachycardic on arrival and code sepsis was initiated.  WBC 6.3.  Lactic acid 3.4, then 2.6.  UA negative for UTI.  Blood cultures pending x 2.  CT abdomen/pelvis shows inflammatory fat stranding noted about the pancreatic tail consistent with acute interstitial pancreatitis.  Recommend correlation with serum amylase and lipase values.  Lipase 71.  Patient reports past history of pancreatitis, stating she was hospitalized about a month ago at [facility]  laminectomy, sigmoid colectomy w/colostomy    FINDINGS:  Lower Chest: No infiltrate or effusion.    Liver: There is normal hepatic parenchymal enhancement. No focal hepatic  mass. Portal and hepatic veins are patent. No intrahepatic biliary ductal  dilation.    Biliary: Gallbladder is nondistended. No gallstones are evident. No  pericholecystic inflammatory change.    Spleen: Normal in size.    Pancreas: Inflammatory retroperitoneal fat stranding noted about the  pancreatic tail.  No organized fluid collection.  No evident ductal dilation.    Adrenal Glands: Normal morphology.    Kidneys / Ureters: Kidneys enhance symmetrically. No hydronephrosis.  No  focal renal mass.  No urinary calculi.    GI/Bowel: Postsurgical changes following right hemicolectomy with ostomy in  the right lower quadrant.  Stomach, small and large bowel are normal in  caliber.  There is no evidence of obstruction.    Peritoneum/Retroperitoneum: No lymphadenopathy or ascites is present.    Aorta/IVC: Aorta is normal in caliber. No evidence of aneurysm. Unremarkable  IVC.    Bladder: Nondistended.    Bones/Soft Tissues: No acute osseus abnormality is identified.  Impression: Inflammatory fat stranding noted about the pancreatic tail consistent with  acute interstitial pancreatitis.  Recommend correlation with serum amylase  and lipase values.       Assessment :      Primary Problem  Pancreatitis, unspecified pancreatitis type    Active Hospital Problems    Diagnosis Date Noted    Pancreatitis, unspecified pancreatitis type [K85.90] 11/01/2024    Anemia [D64.9] 12/31/2021       Plan:     Patient status Admit as inpatient in the  Med/Surge    Patient is 64-year-old female multiple comorbidities primarily came to the hospital with worsening restless leg syndrome, states that she has been clean for many years now getting worse, not taking any medication,  Restless leg syndrome will start Requip at night  Acute pancreatitis, does have history of

## 2024-11-03 NOTE — PROGRESS NOTES
Comprehensive Nutrition Assessment    Type and Reason for Visit:  Positive nutrition screen (wt loss, poor appetite)    Nutrition Recommendations/Plan:   Will continue to provide Ensure Clear supplements with Clear Liquid trays  Recommend Low Fat diet when advanced to solid food     Malnutrition Assessment:  Malnutrition Status:  At risk for malnutrition (11/03/24 1436)    Context:  Chronic Illness     Findings of the 6 clinical characteristics of malnutrition:  Energy Intake:  75% or less estimated energy requirements for 1 month or longer  Weight Loss:   (6% wt loss over 3-4 months)     Body Fat Loss:  Unable to assess     Muscle Mass Loss:  Unable to assess    Fluid Accumulation:  No fluid accumulation     Strength:  Not Performed    Nutrition Assessment:    Pt admitted due to Pancreatitis. Pt reports poor appetite and emesis x 2 PTA. She is now anxious for solid food suggesting nausea resolved. Pt is down 8# over the past 3-4 months.    Nutrition Related Findings:    no edema, Labs/Meds: Reviewed, PMH: Breast Ca, CVA, GERD, dentures Wound Type: None       Current Nutrition Intake & Therapies:    Average Meal Intake: 1-25%     ADULT DIET; Clear Liquid  ADULT ORAL NUTRITION SUPPLEMENT; Breakfast, Dinner; Clear Liquid Oral Supplement    Anthropometric Measures:  Height: 155 cm (5' 1.02\")  Ideal Body Weight (IBW): 105 lbs (48 kg)    Admission Body Weight: 49.9 kg (110 lb) (stated)  Current Body Weight: 53 kg (116 lb 13.5 oz) (obtained by RD),   IBW. Weight Source: Bed scale  Current BMI (kg/m2): 22.1  Usual Body Weight: 56.2 kg (124 lb) (7/24)     % Weight Change (Calculated): -5.8                    BMI Categories: Normal Weight (BMI 18.5-24.9)    Estimated Daily Nutrient Needs:  Energy Requirements Based On: Formula  Weight Used for Energy Requirements: Admission  Energy (kcal/day): Tyler x 1.2= 1200 kcal  Weight Used for Protein Requirements: Admission  Protein (g/day): 1.2g/kg= 65 g  Method Used for Fluid  Requirements: 1 ml/kcal  Fluid (ml/day): 1200 ml  or more    Nutrition Diagnosis:   Inadequate protein-energy intake related to  (current medical condition) as evidenced by NPO or clear liquid status due to medical condition    Nutrition Interventions:   Food and/or Nutrient Delivery: Continue Oral Nutrition Supplement (advance diet as tolerated)  Nutrition Education/Counseling: No recommendation at this time  Coordination of Nutrition Care: Continue to monitor while inpatient       Goals:  Goals: PO intake 50% or greater  Type of Goal: New goal  Previous Goal Met: New Goal    Nutrition Monitoring and Evaluation:      Food/Nutrient Intake Outcomes: Diet Advancement/Tolerance  Physical Signs/Symptoms Outcomes: Biochemical Data, GI Status, Fluid Status or Edema, Skin, Weight    Discharge Planning:    Too soon to determine     Pili Parikh RD, LD  Contact: [phone]

## 2024-11-03 NOTE — CONSULTS
[facility]    GASTROENTEROLOGY CONSULT    Patient:   Rajani Iglesias   :    1960   [facility]ility:   [facility]  Date:    11/3/2024  Admission Dx:  Lactic acid acidosis [E87.20]  Pancreatitis, unspecified pancreatitis type [K85.90]  Idiopathic acute pancreatitis without infection or necrosis [K85.00]  Requesting physician: Damaris Mixon MD  Reason for consult:  Pancreatitis      SUBJECTIVE:  History of Present Illness:  This is a 64 y.o.   female who was admitted 2024 with Lactic acid acidosis [E87.20]  Pancreatitis, unspecified pancreatitis type [K85.90]  Idiopathic acute pancreatitis without infection or necrosis [K85.00]. We have been asked to see the patient in consultation by Damaris Mixon MD for  pancreatitis. This is a 64 year old female with pmh of breast cancer s/p surgery with chemo/radiation,  gerd anxiety htn right colectomy with ileostomy secondary to bowel obstruction in 2024, alcohol abuse hypothyroidism NSTEMI cauda equina syndrome who presented to the ED for abdominal pain with leg pain fatigue and difficulty urinating. She c/o left sided abdominal pain with no radiation and did have nausea with non bloody emesis yesterday. Pain is unchanged today but no further emesis. She is tolerating clear diet.  Pt underwent ct abd that showed fat stranding by the pancreatic tail. Lipase 71. This is her second episode of pancreatitis this year- she was seen at [facility] facility in September of this year, at that time imaging showed hepatic steatosis  with acute pancreatitis . Pt admits to drinking two alcoholic drinks per day. No new  medications. Triglyceride level was not checked. She admits to vaping.     Family hx no liver pancreatic stomach or colon cancer no uc/crohns  Endoscopy hx none    OBJECTIVE:    PAST MEDICAL/SURGICAL HISTORY  Past Medical History:   Diagnosis Date    Anxiety     Arthritis     At maximum risk for fall 2021    caudal  the nurse practitioner time.  That also included history taking follow-up physical examination and review of system.    Electronically signed by Delfin Velasquez MD

## 2024-11-03 NOTE — PROGRESS NOTES
Writer received report from from previous RN Sima. Writer went and introduced self to patient and updated the patient.

## 2024-11-03 NOTE — PLAN OF CARE
Problem: Chronic Conditions and Co-morbidities  Goal: Patient's chronic conditions and co-morbidity symptoms are monitored and maintained or improved  11/3/2024 1153 by Felecia Montana RN  Outcome: Progressing  Flowsheets (Taken 11/2/2024 0458 by Flower Mora, RN)  Care Plan - Patient's Chronic Conditions and Co-Morbidity Symptoms are Monitored and Maintained or Improved:   Monitor and assess patient's chronic conditions and comorbid symptoms for stability, deterioration, or improvement   Collaborate with multidisciplinary team to address chronic and comorbid conditions and prevent exacerbation or deterioration   Update acute care plan with appropriate goals if chronic or comorbid symptoms are exacerbated and prevent overall improvement and discharge  11/3/2024 0305 by Lashae Chappell RN  Outcome: Progressing     Problem: Discharge Planning  Goal: Discharge to home or other facility with appropriate resources  11/3/2024 1153 by Felecia Montana RN  Outcome: Progressing  Flowsheets (Taken 11/2/2024 0458 by Flower Mora, RN)  Discharge to home or other facility with appropriate resources:   Identify barriers to discharge with patient and caregiver   Arrange for needed discharge resources and transportation as appropriate   Identify discharge learning needs (meds, wound care, etc)   Arrange for interpreters to assist at discharge as needed   Refer to discharge planning if patient needs post-hospital services based on physician order or complex needs related to functional status, cognitive ability or social support system  11/3/2024 0305 by Lashae Chappell, RN  Outcome: Progressing     Problem: Skin/Tissue Integrity  Goal: Absence of new skin breakdown  Description: 1.  Monitor for areas of redness and/or skin breakdown  2.  Assess vascular access sites hourly  3.  Every 4-6 hours minimum:  Change oxygen saturation probe site  4.  Every 4-6 hours:  If on nasal continuous

## 2024-11-03 NOTE — PROGRESS NOTES
Physical Therapy  Facility/Department: [facility] MED SURG  Physical Therapy Initial Assessment    Name: Rajani Iglesias  : 1960  MRN: [id number]  Date of Service: 11/3/2024    Discharge Recommendations:  Patient would benefit from continued therapy after discharge, Therapy recommended at discharge          Patient Diagnosis(es): The primary encounter diagnosis was Lactic acid acidosis. A diagnosis of Idiopathic acute pancreatitis without infection or necrosis was also pertinent to this visit.  Past Medical History:  has a past medical history of Anxiety, Arthritis, At maximum risk for fall, Breast cancer (HCC), Brief psychotic disorder (HCC), Cancer (HCC), Cauda equina syndrome (HCC), Cerebrovascular accident (HCC), Cervical stenosis of spine, GERD (gastroesophageal reflux disease), History of stomach ulcers, History of therapeutic radiation, Hx antineoplastic chemo, Hypertension, Hypothyroidism, Lumbar neuritis, NSTEMI (non-ST elevated myocardial infarction) (HCC), Post laminectomy syndrome, Spinal deformity, Thyroid disease, Uses wheelchair, Wears dentures, and Wellness examination.  Past Surgical History:  has a past surgical history that includes hernia repair (Bilateral); Breast surgery (Right); Mastectomy, radical; Hysterectomy, total abdominal; Lumbar spine surgery (N/A, 2021); back surgery; Colonoscopy (about 2018); other surgical history (2022); cervical fusion (N/A, 2022); cervical fusion (N/A, 2022); and Sigmoid Colectomy (N/A, 2024).    Assessment  Assessment: Genralized weakness, will benefit from thrapy while in acute phase for safe DC home  Treatment Diagnosis: Weakness  Therapy Prognosis: Fair  Decision Making: Medium Complexity  History: CVA, ileostomy  Requires PT Follow-Up: Yes  Activity Tolerance  Activity Tolerance: Patient limited by fatigue;Patient limited by endurance    Plan  Physical Therapy Plan  General Plan: 5-7 times per week  Current Treatment Recommendations:

## 2024-11-03 NOTE — PLAN OF CARE
Problem: Chronic Conditions and Co-morbidities  Goal: Patient's chronic conditions and co-morbidity symptoms are monitored and maintained or improved  11/3/2024 0305 by Lashae Chappell RN  Outcome: Progressing     Problem: Discharge Planning  Goal: Discharge to home or other facility with appropriate resources  11/3/2024 0305 by Lashae Chappell RN  Outcome: Progressing     Problem: Skin/Tissue Integrity  Goal: Absence of new skin breakdown  Description: 1.  Monitor for areas of redness and/or skin breakdown  2.  Assess vascular access sites hourly  3.  Every 4-6 hours minimum:  Change oxygen saturation probe site  4.  Every 4-6 hours:  If on nasal continuous positive airway pressure, respiratory therapy assess nares and determine need for appliance change or resting period.  11/3/2024 0305 by Lashae Chappell RN  Outcome: Progressing     Problem: Safety - Adult  Goal: Free from fall injury  11/3/2024 0305 by Lashae Chappell RN  Outcome: Progressing     Problem: ABCDS Injury Assessment  Goal: Absence of physical injury  11/3/2024 0305 by Lashae Chappell RN  Outcome: Progressing

## 2024-11-04 ENCOUNTER — APPOINTMENT (OUTPATIENT)
Dept: ULTRASOUND IMAGING | Age: 64
DRG: 438 | End: 2024-11-04
Payer: MEDICARE

## 2024-11-04 ENCOUNTER — TELEPHONE (OUTPATIENT)
Dept: FAMILY MEDICINE CLINIC | Age: 64
End: 2024-11-04

## 2024-11-04 VITALS
WEIGHT: 110 LBS | DIASTOLIC BLOOD PRESSURE: 76 MMHG | TEMPERATURE: 98.4 F | SYSTOLIC BLOOD PRESSURE: 125 MMHG | OXYGEN SATURATION: 99 % | HEIGHT: 61 IN | BODY MASS INDEX: 20.77 KG/M2 | RESPIRATION RATE: 16 BRPM | HEART RATE: 99 BPM

## 2024-11-04 LAB
ANION GAP SERPL CALCULATED.3IONS-SCNC: 9 MMOL/L (ref 9–16)
BASOPHILS # BLD: 0 K/UL (ref 0–0.2)
BASOPHILS NFR BLD: 1 % (ref 0–2)
BUN SERPL-MCNC: 7 MG/DL (ref 8–23)
CALCIUM SERPL-MCNC: 7.7 MG/DL (ref 8.6–10.4)
CHLORIDE SERPL-SCNC: 103 MMOL/L (ref 98–107)
CO2 SERPL-SCNC: 21 MMOL/L (ref 20–31)
CREAT SERPL-MCNC: 0.6 MG/DL (ref 0.7–1.2)
EOSINOPHIL # BLD: 0.1 K/UL (ref 0–0.4)
EOSINOPHILS RELATIVE PERCENT: 2 % (ref 0–4)
ERYTHROCYTE [DISTWIDTH] IN BLOOD BY AUTOMATED COUNT: 12.5 % (ref 11.5–14.9)
GFR, ESTIMATED: >90 ML/MIN/1.73M2
GLUCOSE SERPL-MCNC: 117 MG/DL (ref 74–99)
HCT VFR BLD AUTO: 28.2 % (ref 36–46)
HGB BLD-MCNC: 9.7 G/DL (ref 12–16)
LYMPHOCYTES NFR BLD: 1.1 K/UL (ref 1–4.8)
LYMPHOCYTES RELATIVE PERCENT: 25 % (ref 24–44)
MAGNESIUM SERPL-MCNC: 1.6 MG/DL (ref 1.6–2.4)
MCH RBC QN AUTO: 36.6 PG (ref 26–34)
MCHC RBC AUTO-ENTMCNC: 34.3 G/DL (ref 31–37)
MCV RBC AUTO: 106.5 FL (ref 80–100)
MONOCYTES NFR BLD: 0.4 K/UL (ref 0.1–1.3)
MONOCYTES NFR BLD: 9 % (ref 1–7)
NEUTROPHILS NFR BLD: 63 % (ref 36–66)
NEUTS SEG NFR BLD: 2.8 K/UL (ref 1.3–9.1)
PLATELET # BLD AUTO: 251 K/UL (ref 150–450)
PMV BLD AUTO: 7.3 FL (ref 6–12)
POTASSIUM SERPL-SCNC: 3 MMOL/L (ref 3.7–5.3)
RBC # BLD AUTO: 2.65 M/UL (ref 4–5.2)
SODIUM SERPL-SCNC: 133 MMOL/L (ref 136–145)
WBC OTHER # BLD: 4.3 K/UL (ref 3.5–11)

## 2024-11-04 PROCEDURE — 6360000002 HC RX W HCPCS: Performed by: NURSE PRACTITIONER

## 2024-11-04 PROCEDURE — 85025 COMPLETE CBC W/AUTO DIFF WBC: CPT

## 2024-11-04 PROCEDURE — 76705 ECHO EXAM OF ABDOMEN: CPT

## 2024-11-04 PROCEDURE — 6370000000 HC RX 637 (ALT 250 FOR IP): Performed by: INTERNAL MEDICINE

## 2024-11-04 PROCEDURE — 80048 BASIC METABOLIC PNL TOTAL CA: CPT

## 2024-11-04 PROCEDURE — 6370000000 HC RX 637 (ALT 250 FOR IP): Performed by: NURSE PRACTITIONER

## 2024-11-04 PROCEDURE — APPSS30 APP SPLIT SHARED TIME 16-30 MINUTES: Performed by: NURSE PRACTITIONER

## 2024-11-04 PROCEDURE — 36415 COLL VENOUS BLD VENIPUNCTURE: CPT

## 2024-11-04 PROCEDURE — 83735 ASSAY OF MAGNESIUM: CPT

## 2024-11-04 PROCEDURE — 99239 HOSP IP/OBS DSCHRG MGMT >30: CPT | Performed by: INTERNAL MEDICINE

## 2024-11-04 RX ORDER — ROPINIROLE 1 MG/1
1 TABLET, FILM COATED ORAL NIGHTLY
Qty: 90 TABLET | Refills: 3 | Status: SHIPPED | OUTPATIENT
Start: 2024-11-04

## 2024-11-04 RX ADMIN — SODIUM BICARBONATE 650 MG: 650 TABLET ORAL at 07:19

## 2024-11-04 RX ADMIN — THIAMINE HCL TAB 100 MG 100 MG: 100 TAB at 07:24

## 2024-11-04 RX ADMIN — DILTIAZEM HYDROCHLORIDE 240 MG: 120 CAPSULE, COATED, EXTENDED RELEASE ORAL at 07:19

## 2024-11-04 RX ADMIN — PANTOPRAZOLE SODIUM 40 MG: 40 TABLET, DELAYED RELEASE ORAL at 07:19

## 2024-11-04 RX ADMIN — Medication 1 TABLET: at 07:19

## 2024-11-04 RX ADMIN — ACETAMINOPHEN 650 MG: 325 TABLET ORAL at 05:13

## 2024-11-04 RX ADMIN — POTASSIUM CHLORIDE 40 MEQ: 1500 TABLET, EXTENDED RELEASE ORAL at 07:19

## 2024-11-04 RX ADMIN — LEVOTHYROXINE SODIUM 100 MCG: 0.1 TABLET ORAL at 06:21

## 2024-11-04 RX ADMIN — TAMSULOSIN HYDROCHLORIDE 0.4 MG: 0.4 CAPSULE ORAL at 07:19

## 2024-11-04 RX ADMIN — ENOXAPARIN SODIUM 30 MG: 100 INJECTION SUBCUTANEOUS at 07:19

## 2024-11-04 RX ADMIN — SODIUM BICARBONATE 650 MG: 650 TABLET ORAL at 12:06

## 2024-11-04 ASSESSMENT — PAIN SCALES - GENERAL
PAINLEVEL_OUTOF10: 3
PAINLEVEL_OUTOF10: 8

## 2024-11-04 ASSESSMENT — PAIN - FUNCTIONAL ASSESSMENT: PAIN_FUNCTIONAL_ASSESSMENT: ACTIVITIES ARE NOT PREVENTED

## 2024-11-04 ASSESSMENT — PAIN DESCRIPTION - DESCRIPTORS: DESCRIPTORS: ACHING

## 2024-11-04 ASSESSMENT — PAIN DESCRIPTION - LOCATION: LOCATION: OTHER (COMMENT)

## 2024-11-04 ASSESSMENT — PAIN DESCRIPTION - ORIENTATION: ORIENTATION: OTHER (COMMENT)

## 2024-11-04 NOTE — PROGRESS NOTES
[facility]    Gastroenterology Daily Progress Note      Patient:   Rajani Iglesias   :    1960   Facility:   [facility]   Date:     2024  Consultant:   Anastasiya Morrow, CASSANDRA - CNP, CNP      SUBJECTIVE  64 y.o. female admitted 2024 with Lactic acid acidosis [E87.20]  Pancreatitis, unspecified pancreatitis type [K85.90]  Idiopathic acute pancreatitis without infection or necrosis [K85.00] and seen for pancreatitis. The pt was seen and examined. Pt tolerating clear diet with no emesis, c/o left sided abdominal pain \"only when someone pushes on it\" does report \",mild\" lower back pain. IGG level pending. Hgb 9.7 anemia profile is normal. .        OBJECTIVE  Scheduled Meds:   sodium bicarbonate  650 mg Oral 4x Daily    rOPINIRole  1 mg Oral Nightly    multivitamin  1 tablet Oral Daily    thiamine  100 mg Oral Daily    sodium chloride flush  5-40 mL IntraVENous 2 times per day    enoxaparin  30 mg SubCUTAneous Daily    dilTIAZem  240 mg Oral Daily    levothyroxine  100 mcg Oral Daily    pantoprazole  40 mg Oral Daily    QUEtiapine  25 mg Oral Nightly    tamsulosin  0.4 mg Oral Daily       Vital Signs:  /76   Pulse 99   Temp 98.4 °F (36.9 °C)   Resp 16   Ht 1.55 m (5' 1.02\")   Wt 49.9 kg (110 lb)   SpO2 99%   BMI 20.77 kg/m²    Review of Systems - History obtained from chart review and the patient  General ROS: negative  Respiratory ROS: no cough, shortness of breath, or wheezing  Cardiovascular ROS: no chest pain or dyspnea on exertion  Gastrointestinal ROS: positive for - abdominal pain   Physical Exam:     General Appearance: alert and oriented to person, place and time, well-developed and well-nourished, in no acute distress  Skin: warm and dry, no rash or erythema  Head: normocephalic and atraumatic  Eyes: pupils equal, round, and reactive to light, extraocular eye movements intact, conjunctivae normal  ENT: hearing grossly normal bilaterally  Neck: neck supple

## 2024-11-04 NOTE — TELEPHONE ENCOUNTER
[facility] Care Transitions Initial Follow Up Call    Outreach made within 2 business days of discharge: Yes    Patient: Rajani Iglesias Patient : 1960   MRN: [id number]  Reason for Admission: There are no discharge diagnoses documented for the most recent discharge.  Discharge Date: 24       Spoke with:  case management     If Virtual visit made for hospital follow up, advise patient to make sure to have family member present to help assist with visit. Please make sure mobile number is updated in patient chart.     Discharge department/facility:  2024 - present (3 days)  [facility]       Damaris Mixon MD  Last attending  Treatment team Pancreatitis, unspecified pancreatitis type  Principal problem       TCM Interactive Patient Contact:  Was patient able to fill all prescriptions: Yes  Was patient instructed to bring all medications to the follow-up visit: Yes  Is patient taking all medications as directed in the discharge summary? Yes  Does patient understand their discharge instructions: Yes  Does patient have questions or concerns that need addressed prior to 7-14 day follow up office visit: yes    Scheduled appointment with PCP within 7-14 days    Follow Up  Future Appointments   Date Time Provider Department Center   2024  1:45 PM Kailey Pepe MD fp sc [facility] DEP   2024  3:00 PM Christy Lynn MD [facility] med/reha [facility]       Madie Bravo

## 2024-11-04 NOTE — PROGRESS NOTES
AVS reviewed with patient. All questions answered. IV removed no complications. Personal belongings gathered and personal transportation called.

## 2024-11-04 NOTE — PROGRESS NOTES
CLINICAL PHARMACY NOTE: MEDS TO BEDS    Total # of Prescriptions Filled: 1   The following medications were delivered to the patient:  Ropinirole 1mg    Additional Documentation: 11/4/24 kbg delivered to pt and family in room 11:58am     -Remaining refills transferred to [facility] [id number] [place] [id number] - P [phone]

## 2024-11-04 NOTE — PLAN OF CARE
Problem: Chronic Conditions and Co-morbidities  Goal: Patient's chronic conditions and co-morbidity symptoms are monitored and maintained or improved  Outcome: Adequate for Discharge     Problem: Discharge Planning  Goal: Discharge to home or other facility with appropriate resources  Outcome: Adequate for Discharge     Problem: Skin/Tissue Integrity  Goal: Absence of new skin breakdown  Description: 1.  Monitor for areas of redness and/or skin breakdown  2.  Assess vascular access sites hourly  3.  Every 4-6 hours minimum:  Change oxygen saturation probe site  4.  Every 4-6 hours:  If on nasal continuous positive airway pressure, respiratory therapy assess nares and determine need for appliance change or resting period.  Outcome: Adequate for Discharge     Problem: Safety - Adult  Goal: Free from fall injury  Outcome: Adequate for Discharge     Problem: ABCDS Injury Assessment  Goal: Absence of physical injury  Outcome: Adequate for Discharge     Problem: Nutrition Deficit:  Goal: Optimize nutritional status  Outcome: Adequate for Discharge     Problem: Pain  Goal: Verbalizes/displays adequate comfort level or baseline comfort level  Outcome: Adequate for Discharge

## 2024-11-04 NOTE — PROGRESS NOTES
[facility]   IN-PATIENT SERVICE   [facility]  Progress note Date:   11/4/2024  Patient name:  Rajani Iglesias  Date of admission:  11/1/2024  6:41 PM  MRN:   [id number]  Account:  [id number]  YOB: 1960  PCP:    Kailey Pepe MD  Room:   2044/2044-01  Code Status:    Full Code    Chief Complaint:     Chief Complaint   Patient presents with    Urinary Frequency    Leg Pain       History Obtained From:     patient    History of Present Illness:     The patient is a 64 y.o.  Non- / non  female who presents with Urinary Frequency and Leg Pain   and she is admitted to the hospital for the management of      Rajani Iglesias is a 64 y.o. Non- / non  female who presents with Urinary Frequency and Leg Pain   and is admitted to the hospital for the management of Pancreatitis, unspecified pancreatitis type.     Patient's medical history significant for cauda equina syndrome, diastolic heart failure, history of a stroke, ileostomy, and postlaminectomy syndrome.     According to patient, she came to the ED today to get help for her restless legs.  States that she cannot stop moving her legs and it is keeping her from being able to rest.  States that she does not currently take any medications to help this.  States that he is concerned it is related to her past history of neck surgery.  Patient has mild epigastric tenderness to palpation and reports poor p.o. intake today due to nausea and 2 bouts of emesis.     Patient tachycardic on arrival and code sepsis was initiated.  WBC 6.3.  Lactic acid 3.4, then 2.6.  UA negative for UTI.  Blood cultures pending x 2.  CT abdomen/pelvis shows inflammatory fat stranding noted about the pancreatic tail consistent with acute interstitial pancreatitis.  Recommend correlation with serum amylase and lipase values.  Lipase 71.  Patient reports past history of pancreatitis, stating she was hospitalized about a month ago at [facility]

## 2024-11-04 NOTE — CARE COORDINATION
ONGOING DISCHARGE PLAN:    Patient is alert and oriented x4.    Spoke with patient regarding discharge plan and patient confirms that plan is still to return home with spouse and visiting nurse service. This writer spoke directly with Deonna Rosenbaum, via [employer], to ensure that the patient is accepted to their services (which has been confirmed).     DME: Walker, cane, DAMIR/ROSALIA, [place], [place].    VNS: [facility].     GI-pancreatitis.     Hospital follow up appointment made for 11/12/24 at 1:45 PM    Will continue to follow for additional discharge needs.    If patient is discharged prior to next notation, then this note serves as note for discharge by case management.    Electronically signed by Fariba Dutta RN on 11/4/2024 at 10:10 AM    ADDENDUM:    Outpatient referral for external physical therapy placed on the outside of the chart.    Electronically signed by Fariba Dutta RN on 11/4/2024 at 12:25 PM

## 2024-11-05 ENCOUNTER — CARE COORDINATION (OUTPATIENT)
Dept: CARE COORDINATION | Age: 64
End: 2024-11-05

## 2024-11-05 ENCOUNTER — ENROLLMENT (OUTPATIENT)
Dept: CARE COORDINATION | Age: 64
End: 2024-11-05

## 2024-11-05 NOTE — CARE COORDINATION
Care Transitions Note    Initial Call - Call within 2 business days of discharge: Yes    Attempted to reach patient for transitions of care follow up. Unable to reach patient.    Outreach Attempts:   HIPAA compliant voicemail left for patient.     Patient: Rajani Iglesias    Patient : 1960   MRN: [id number]    Reason for Admission: Lactose acid acidosis  Discharge Date: 24  RURS: Readmission Risk Score: 21    Last Discharge Facility       Date Complaint Diagnosis Description Type Department Provider    24 Urinary Frequency; Leg Pain Lactic acid acidosis ... ED to Hosp-Admission (Discharged) (ADMITTED) [facility] Damaris Mixon MD; Stacey Person...          # 1 attempt-Attempted initial 24 hour hospital follow up call. Left a Hipaa compliant message with name and call back information. Requested return call to [phone].     Was this an external facility discharge? No    Follow Up Appointment:   Patient has hospital follow up appointment scheduled within 7 days of discharge.    Future Appointments         Provider Specialty Dept Phone    2024 1:45 PM Kailey Pepe MD Family Medicine [phone]    2024 3:00 PM Christy Lynn MD Physical Medicine and Rehab [phone]            Plan for follow-up on next business day. 2nd attempt 24 hr HFU call     Diane Villarreal RN

## 2024-11-06 ENCOUNTER — CARE COORDINATION (OUTPATIENT)
Dept: CARE COORDINATION | Age: 64
End: 2024-11-06

## 2024-11-06 DIAGNOSIS — K85.00 IDIOPATHIC ACUTE PANCREATITIS WITHOUT INFECTION OR NECROSIS: Primary | ICD-10-CM

## 2024-11-06 LAB
IGG 1: 970 MG/DL (ref 240–1118)
IGG 2: 448 MG/DL (ref 124–549)
IGG 3: 34 MG/DL (ref 21–134)
IGG4 SER-MCNC: 65 MG/DL (ref 1–123)
MICROORGANISM SPEC CULT: NORMAL
MICROORGANISM SPEC CULT: NORMAL
SERVICE CMNT-IMP: NORMAL
SERVICE CMNT-IMP: NORMAL
SPECIMEN DESCRIPTION: NORMAL
SPECIMEN DESCRIPTION: NORMAL

## 2024-11-06 PROCEDURE — 1111F DSCHRG MED/CURRENT MED MERGE: CPT

## 2024-11-11 NOTE — DISCHARGE SUMMARY
IN-PATIENT SERVICE   [facility]    Discharge Summary     Patient ID: Rajani Iglesias  :  1960   MRN: [id number]     ACCOUNT:  [id number]   Patient's PCP: Kailey Pepe MD  Admit Date: 2024   Discharge Date: 2024    Length of Stay: 3  Code Status:  Prior  Admitting Physician: Damaris Mixon MD  Discharge Physician: Damaris Mixon MD     Active Discharge Diagnoses:     Primary Problem  Pancreatitis, unspecified pancreatitis type      Hospital Problems  Active Hospital Problems    Diagnosis Date Noted    Lactic acid acidosis [E87.20] 2024    Idiopathic acute pancreatitis without infection or necrosis [K85.00] 2024    Pancreatitis, unspecified pancreatitis type [K85.90] 2024    Anemia [D64.9] 2021       Admission Condition:  fair     Discharged Condition: fair    Hospital Stay:     Hospital Course:  Rajani Iglesias is a 64 y.o. female who was admitted for the management of Pancreatitis, unspecified pancreatitis type , presented to ER with Urinary Frequency and Leg Pain    Rajani Iglesias is a 64 y.o. Non- / non  female who presents with Urinary Frequency and Leg Pain   and is admitted to the hospital for the management of Pancreatitis, unspecified pancreatitis type.     Patient's medical history significant for cauda equina syndrome, diastolic heart failure, history of a stroke, ileostomy, and postlaminectomy syndrome.      Patient primarily came for worsening restless leg, started on Requip, with improvement, also found to have pancreatitis, GI consulted, started on diet which she tolerated well, discharged home in stable condition.    Significant therapeutic interventions:     Significant Diagnostic Studies:   Labs / Micro:        Radiology:    US ABDOMEN LIMITED Specify organ? LIVER, GALLBLADDER, PANCREAS    Result Date: 2024  EXAMINATION: RIGHT UPPER QUADRANT ULTRASOUND 2024 1:17 pm COMPARISON: CT abdomen 2024

## 2024-11-11 NOTE — PROGRESS NOTES
Physician Progress Note      PATIENT:               CHET GASPAR  CSN #:                  [id number]  :                       1960  ADMIT DATE:       2024 6:41 PM  DISCH DATE:        2024 2:27 PM  RESPONDING  PROVIDER #:        Damaris Mixon MD          QUERY TEXT:    Patient admitted with acute alcohol induced pancreatitis. Noted documentation   of type 2 MI in IM notes. In order to support the diagnosis of type 2 MI,   please include additional clinical indicators in your documentation.  Or   please document if the diagnosis of type 2 MI has been ruled out after further   study.    The medical record reflects the following:  Risk Factors: ETOH induced pancreatitis,  Clinical Indicators: presented with alcohol induced pancreatitis, noted to   have elevated troponins, IM notes reflect type 2 MI and chronically elevated   troponin, no CP, EKG read ST with left posterior fascicular block; troponin   trend 34, 31  Treatment: Labs, imaging, monitoring  Options provided:  -- Type 2 MI present as evidenced by, Please document evidence.  -- Type 2 MI was ruled out  -- Other - I will add my own diagnosis  -- Disagree - Not applicable / Not valid  -- Disagree - Clinically unable to determine / Unknown  -- Refer to Clinical Documentation Reviewer    PROVIDER RESPONSE TEXT:    Type 2 MI was ruled out after study.    Query created by: Luisa Boss on 2024 1:58 PM      Electronically signed by:  Damaris Mixon MD 2024 8:00 AM

## 2024-11-12 ENCOUNTER — OFFICE VISIT (OUTPATIENT)
Dept: FAMILY MEDICINE CLINIC | Age: 64
End: 2024-11-12

## 2024-11-12 VITALS
HEART RATE: 118 BPM | OXYGEN SATURATION: 98 % | HEIGHT: 61 IN | DIASTOLIC BLOOD PRESSURE: 74 MMHG | WEIGHT: 97.8 LBS | SYSTOLIC BLOOD PRESSURE: 120 MMHG | BODY MASS INDEX: 18.46 KG/M2

## 2024-11-12 DIAGNOSIS — E87.6 HYPOKALEMIA: ICD-10-CM

## 2024-11-12 DIAGNOSIS — E83.51 HYPOCALCEMIA: ICD-10-CM

## 2024-11-12 DIAGNOSIS — R00.0 SINUS TACHYCARDIA: ICD-10-CM

## 2024-11-12 DIAGNOSIS — Z93.2 ILEOSTOMY IN PLACE (HCC): ICD-10-CM

## 2024-11-12 DIAGNOSIS — E61.1 IRON DEFICIENCY: ICD-10-CM

## 2024-11-12 DIAGNOSIS — E03.8 OTHER SPECIFIED HYPOTHYROIDISM: ICD-10-CM

## 2024-11-12 DIAGNOSIS — R39.81 FUNCTIONAL URINARY INCONTINENCE: ICD-10-CM

## 2024-11-12 DIAGNOSIS — K85.90 PANCREATITIS, UNSPECIFIED PANCREATITIS TYPE: Primary | ICD-10-CM

## 2024-11-12 DIAGNOSIS — Z09 HOSPITAL DISCHARGE FOLLOW-UP: ICD-10-CM

## 2024-11-12 PROBLEM — N30.00 ACUTE CYSTITIS WITHOUT HEMATURIA: Status: RESOLVED | Noted: 2022-04-13 | Resolved: 2024-11-12

## 2024-11-12 PROBLEM — E87.20 LACTIC ACID ACIDOSIS: Status: RESOLVED | Noted: 2024-11-03 | Resolved: 2024-11-12

## 2024-11-12 PROBLEM — R90.89 ABNORMAL CT OF BRAIN: Status: RESOLVED | Noted: 2023-12-24 | Resolved: 2024-11-12

## 2024-11-12 RX ORDER — OXYBUTYNIN CHLORIDE 5 MG/1
5 TABLET, EXTENDED RELEASE ORAL DAILY
Qty: 30 TABLET | Refills: 0 | Status: SHIPPED | OUTPATIENT
Start: 2024-11-12

## 2024-11-12 ASSESSMENT — ENCOUNTER SYMPTOMS
SHORTNESS OF BREATH: 0
WHEEZING: 0
BACK PAIN: 1
SORE THROAT: 0
RECTAL PAIN: 0
BLOOD IN STOOL: 0
ABDOMINAL DISTENTION: 0
CHEST TIGHTNESS: 0
COUGH: 0
NAUSEA: 0
RHINORRHEA: 0
STRIDOR: 0
CONSTIPATION: 0
TROUBLE SWALLOWING: 0
COLOR CHANGE: 0
VOMITING: 0
SINUS PRESSURE: 0
DIARRHEA: 0
EYE REDNESS: 0
ABDOMINAL PAIN: 1

## 2024-11-12 NOTE — PROGRESS NOTES
Visit Information    Have you changed or started any medications since your last visit including any over-the-counter medicines, vitamins, or herbal medicines? no   Are you having any side effects from any of your medications? -  no  Have you stopped taking any of your medications? Is so, why? -  no    Have you seen any other physician or provider since your last visit? Yes - Records Obtained  Have you had any other diagnostic tests since your last visit? Yes - Records Obtained  Have you been seen in the emergency room and/or had an admission to a hospital since we last saw you? Yes - Records Obtained  Have you had your routine dental cleaning in the past 6 months? no    Have you activated your [employer] account? If not, what are your barriers? Yes     Patient Care Team:  Kailey Pepe MD as PCP - General (Family Medicine)  [facility]  Giovanna Brasher MD (Family Medicine)  Sugey Morgan MD as Consulting Physician (Gastroenterology)  Sugey Morgan MD as Consulting Physician (Gastroenterology)  Rona Augustin RN as Ambulatory Care Manager    Medical History Review  Past Medical, Family, and Social History reviewed and does contribute to the patient presenting condition    Health Maintenance   Topic Date Due    Shingles vaccine (1 of 2) Never done    Respiratory Syncytial Virus (RSV) Pregnant or age 60 yrs+ (1 - 1-dose 60+ series) Never done    Annual Wellness Visit (Medicare)  03/24/2024    Breast cancer screen  05/18/2024    COVID-19 Vaccine (5 - 2023-24 season) 09/01/2024    Depression Monitoring  09/10/2025    Lipids  10/07/2025    Colorectal Cancer Screen  07/09/2028    DTaP/Tdap/Td vaccine (2 - Td or Tdap) 07/25/2029    Flu vaccine  Completed    Pneumococcal 0-64 years Vaccine  Completed    Hepatitis C screen  Completed    HIV screen  Completed    Hepatitis A vaccine  Aged Out    Hepatitis B vaccine  Aged Out    Hib vaccine  Aged Out    Polio vaccine  Aged Out    
1.02\")   Wt 44.4 kg (97 lb 12.8 oz)   SpO2 98%   BMI 18.46 kg/m²   Physical Exam  Constitutional:       General: She is not in acute distress.     Appearance: She is well-developed. She is not diaphoretic.   HENT:      Head: Normocephalic and atraumatic.   Eyes:      Conjunctiva/sclera: Conjunctivae normal.      Pupils: Pupils are equal, round, and reactive to light.   Neck:      Thyroid: No thyromegaly.   Cardiovascular:      Rate and Rhythm: Normal rate and regular rhythm.      Heart sounds: Normal heart sounds. No murmur heard.  Pulmonary:      Effort: Pulmonary effort is normal. No respiratory distress.      Breath sounds: Normal breath sounds. No wheezing.   Abdominal:      Palpations: Abdomen is soft.      Tenderness: There is no abdominal tenderness. There is no right CVA tenderness, left CVA tenderness or guarding.   Musculoskeletal:         General: No tenderness.      Cervical back: Normal range of motion.      Thoracic back: Spasms present. Decreased range of motion.      Lumbar back: Deformity and spasms present. No tenderness. Decreased range of motion.   Neurological:      General: No focal deficit present.      Mental Status: She is alert and oriented to person, place, and time.      Cranial Nerves: No cranial nerve deficit.      Sensory: No sensory deficit.      Motor: Weakness present.      Gait: Gait abnormal.      Deep Tendon Reflexes: Reflexes are normal and symmetric.      Comments: Patient is currently on wheelchair.   Psychiatric:         Thought Content: Thought content normal.             An electronic signature was used to authenticate this note.  --Kailey Pepe MD

## 2024-11-19 RX ORDER — LEVOTHYROXINE SODIUM 100 UG/1
100 TABLET ORAL DAILY
Qty: 90 TABLET | Refills: 3 | Status: SHIPPED | OUTPATIENT
Start: 2024-11-19

## 2024-11-21 ENCOUNTER — TELEPHONE (OUTPATIENT)
Dept: FAMILY MEDICINE CLINIC | Age: 64
End: 2024-11-21

## 2024-11-21 DIAGNOSIS — G99.2 STENOSIS OF CERVICAL SPINE WITH MYELOPATHY (HCC): ICD-10-CM

## 2024-11-21 DIAGNOSIS — G83.4 CAUDA EQUINA COMPRESSION (HCC): ICD-10-CM

## 2024-11-21 DIAGNOSIS — M48.02 STENOSIS OF CERVICAL SPINE WITH MYELOPATHY (HCC): ICD-10-CM

## 2024-11-21 DIAGNOSIS — S14.109S SPINAL CORD INJURY, CERVICAL REGION, SEQUELA (HCC): ICD-10-CM

## 2024-11-21 DIAGNOSIS — M54.16 LUMBAR RADICULOPATHY, CHRONIC: Primary | Chronic | ICD-10-CM

## 2024-11-21 NOTE — TELEPHONE ENCOUNTER
Leif from [facility] called.    Since Rajani was last seen 11/12/24, she forgot to ask at the appointment for a referral for Home Health.    So he was calling for a new referral to be faxed over to them as soon as it is available.    Fax # [phone]    Thank you.

## 2024-11-24 NOTE — CARE COORDINATION
Care Transitions Outreach Attempt #1  Initial 24 hour call.     Call within 2 business days of discharge: Yes   Attempted to reach patient for transitions of care follow up. Unable to reach patient, unable to leave message, HIPAA compliant message left on VM of  requesting a return call.    Patient: Rajani Iglesias Patient : 1960 MRN: [id number]    Last Discharge Facility       Date Complaint Diagnosis Description Type Department Provider    23  Tetraparesis (HCC) ... Admission (Discharged) [facility] Christy Lynn MD              Was this an external facility discharge? No Discharge Facility: [facility] rehab    Noted following upcoming appointments from discharge chart review:   [facility] follow up appointment(s):   Future Appointments   Date Time Provider Department Center   2024  9:00 AM Giovanna Brasher MD [facility]   3/6/2024  2:30 PM Christy Lynn MD [facility] med/reha [facility]        Johnna Donaldson LPN  Care Transition Nurse     History of bradycardia with atrial fibrillation in 40s-50s  Upon contact with EMS, heart rates 20s to 40s.  Now stable in the 40s  Required TVP recently prior to ERCP  No hypotension   Transcutaneous pacer pads applied   Atropine at bedside to use as needed  Monitor in ICU  Repeat echo with normal EF  Will plan for permanent pacemaker on Monday 11/25/2024   Discussed with Interventional Cardiology  Continue to hold Eliquis, no bridging needed

## 2024-11-25 ENCOUNTER — HOSPITAL ENCOUNTER (OUTPATIENT)
Age: 64
Setting detail: SPECIMEN
Discharge: HOME | End: 2024-11-25
Payer: MEDICARE

## 2024-11-25 DIAGNOSIS — E87.6 HYPOKALEMIA: ICD-10-CM

## 2024-11-25 DIAGNOSIS — R39.81 FUNCTIONAL URINARY INCONTINENCE: ICD-10-CM

## 2024-11-25 DIAGNOSIS — K85.90 PANCREATITIS, UNSPECIFIED PANCREATITIS TYPE: ICD-10-CM

## 2024-11-25 DIAGNOSIS — E83.51 HYPOCALCEMIA: ICD-10-CM

## 2024-11-25 LAB
ALBUMIN SERPL-MCNC: 4.3 G/DL (ref 3.5–5.2)
ALBUMIN/GLOB SERPL: 1 {RATIO} (ref 1–2.5)
ALP SERPL-CCNC: 58 U/L (ref 35–104)
ALT SERPL-CCNC: 13 U/L (ref 10–35)
ANION GAP SERPL CALCULATED.3IONS-SCNC: 15 MMOL/L (ref 9–16)
AST SERPL-CCNC: 20 U/L (ref 10–35)
BACTERIA URNS QL MICRO: ABNORMAL
BASOPHILS # BLD: 0.04 K/UL (ref 0–0.2)
BASOPHILS NFR BLD: 1 % (ref 0–2)
BILIRUB SERPL-MCNC: 0.7 MG/DL (ref 0–1.2)
BILIRUB UR QL STRIP: NEGATIVE
BUN SERPL-MCNC: 25 MG/DL (ref 8–23)
CALCIUM SERPL-MCNC: 11.5 MG/DL (ref 8.6–10.4)
CHLORIDE SERPL-SCNC: 102 MMOL/L (ref 98–107)
CLARITY UR: ABNORMAL
CO2 SERPL-SCNC: 20 MMOL/L (ref 20–31)
COLOR UR: YELLOW
CREAT SERPL-MCNC: 1.1 MG/DL (ref 0.6–0.9)
CRYSTALS URNS MICRO: ABNORMAL /HPF
CRYSTALS URNS MICRO: ABNORMAL /HPF
EOSINOPHIL # BLD: 0.03 K/UL (ref 0–0.44)
EOSINOPHILS RELATIVE PERCENT: 0 % (ref 1–4)
EPI CELLS #/AREA URNS HPF: ABNORMAL /HPF (ref 0–5)
ERYTHROCYTE [DISTWIDTH] IN BLOOD BY AUTOMATED COUNT: 11.9 % (ref 11.8–14.4)
GFR, ESTIMATED: 56 ML/MIN/1.73M2
GLUCOSE SERPL-MCNC: 133 MG/DL (ref 74–99)
GLUCOSE UR STRIP-MCNC: NEGATIVE MG/DL
HCT VFR BLD AUTO: 36.6 % (ref 36.3–47.1)
HGB BLD-MCNC: 11.7 G/DL (ref 11.9–15.1)
HGB UR QL STRIP.AUTO: NEGATIVE
IMM GRANULOCYTES # BLD AUTO: 0.04 K/UL (ref 0–0.3)
IMM GRANULOCYTES NFR BLD: 1 %
KETONES UR STRIP-MCNC: NEGATIVE MG/DL
LEUKOCYTE ESTERASE UR QL STRIP: ABNORMAL
LIPASE SERPL-CCNC: 28 U/L (ref 13–60)
LYMPHOCYTES NFR BLD: 1.34 K/UL (ref 1.1–3.7)
LYMPHOCYTES RELATIVE PERCENT: 16 % (ref 24–43)
MCH RBC QN AUTO: 34.3 PG (ref 25.2–33.5)
MCHC RBC AUTO-ENTMCNC: 32 G/DL (ref 28.4–34.8)
MCV RBC AUTO: 107.3 FL (ref 82.6–102.9)
MONOCYTES NFR BLD: 0.56 K/UL (ref 0.1–1.2)
MONOCYTES NFR BLD: 7 % (ref 3–12)
NEUTROPHILS NFR BLD: 75 % (ref 36–65)
NEUTS SEG NFR BLD: 6.51 K/UL (ref 1.5–8.1)
NITRITE UR QL STRIP: NEGATIVE
NRBC BLD-RTO: 0 PER 100 WBC
PH UR STRIP: 5.5 [PH] (ref 5–8)
PLATELET # BLD AUTO: 345 K/UL (ref 138–453)
PMV BLD AUTO: 9.7 FL (ref 8.1–13.5)
POTASSIUM SERPL-SCNC: 4.3 MMOL/L (ref 3.7–5.3)
PROT SERPL-MCNC: 8.4 G/DL (ref 6.6–8.7)
PROT UR STRIP-MCNC: ABNORMAL MG/DL
RBC # BLD AUTO: 3.41 M/UL (ref 3.95–5.11)
RBC # BLD: ABNORMAL 10*6/UL
RBC #/AREA URNS HPF: ABNORMAL /HPF (ref 0–2)
SODIUM SERPL-SCNC: 137 MMOL/L (ref 136–145)
SP GR UR STRIP: 1.02 (ref 1–1.03)
UROBILINOGEN UR STRIP-ACNC: NORMAL EU/DL (ref 0–1)
WBC #/AREA URNS HPF: ABNORMAL /HPF (ref 0–5)
WBC OTHER # BLD: 8.5 K/UL (ref 3.5–11.3)

## 2024-11-25 PROCEDURE — 85025 COMPLETE CBC W/AUTO DIFF WBC: CPT

## 2024-11-25 PROCEDURE — 80053 COMPREHEN METABOLIC PANEL: CPT

## 2024-11-25 PROCEDURE — 36415 COLL VENOUS BLD VENIPUNCTURE: CPT

## 2024-11-25 PROCEDURE — 83690 ASSAY OF LIPASE: CPT

## 2024-11-25 PROCEDURE — 81001 URINALYSIS AUTO W/SCOPE: CPT

## 2024-11-26 DIAGNOSIS — E61.1 IRON DEFICIENCY: Primary | ICD-10-CM

## 2024-11-26 DIAGNOSIS — E83.52 HYPERCALCEMIA: ICD-10-CM

## 2024-12-02 ENCOUNTER — TELEPHONE (OUTPATIENT)
Dept: WOUND CARE | Age: 64
End: 2024-12-02

## 2024-12-02 NOTE — TELEPHONE ENCOUNTER
Return call attempted regarding voicemail left that they are having problems with consistently leaking pouches for the past month.  Voicemail left.    Lupe SIMPSONC, CWS, CWOCN-AP  [facility]  [facility]  [phone]       12:55pm: Return call received from Ki to notify me of problems with pouch splitting with [employer] #[id number] pouches. He has called the supplier who are replacing the items.

## 2024-12-06 ENCOUNTER — TELEPHONE (OUTPATIENT)
Dept: FAMILY MEDICINE CLINIC | Age: 64
End: 2024-12-06

## 2024-12-06 NOTE — TELEPHONE ENCOUNTER
NURSE MONA STATED TODAY DURING MANUAL/ELECTRONIC BP CHECK THE PATIENT HAD LOW READINGS.     STATED READIN/50    NURSE STATES PATIENT FEELS FINE AND SHOWING NO SYMPTOMS/NOT DIZZY JUST WANTED TO INFORM YOU OF READING.

## 2024-12-06 NOTE — TELEPHONE ENCOUNTER
Patient can be well-hydrated, she is ONLY on 1 medications as diltiazem that is usually for her heart rate.

## 2024-12-10 DIAGNOSIS — R39.81 FUNCTIONAL URINARY INCONTINENCE: ICD-10-CM

## 2024-12-10 RX ORDER — OXYBUTYNIN CHLORIDE 5 MG/1
5 TABLET, EXTENDED RELEASE ORAL DAILY
Qty: 30 TABLET | Refills: 0 | Status: SHIPPED | OUTPATIENT
Start: 2024-12-10 | End: 2024-12-12 | Stop reason: SINTOL

## 2024-12-10 NOTE — TELEPHONE ENCOUNTER
Please Approve or Refuse.  Send to Pharmacy per Pt's Request: TAM     Next Visit Date:  12/12/2024   Last Visit Date: 11/12/2024    Hemoglobin A1C (%)   Date Value   12/18/2023 3.9 (L)             ( goal A1C is < 7)   BP Readings from Last 3 Encounters:   11/12/24 120/74   11/04/24 125/76   09/10/24 98/60          (goal 120/80)  BUN   Date Value Ref Range Status   11/25/2024 25 (H) 8 - 23 mg/dL Final     Creatinine   Date Value Ref Range Status   11/25/2024 1.1 (H) 0.6 - 0.9 mg/dL Final     Potassium   Date Value Ref Range Status   11/25/2024 4.3 3.7 - 5.3 mmol/L Final

## 2024-12-12 ENCOUNTER — HOSPITAL ENCOUNTER (OUTPATIENT)
Age: 64
Setting detail: SPECIMEN
Discharge: HOME | End: 2024-12-12

## 2024-12-12 ENCOUNTER — TELEMEDICINE (OUTPATIENT)
Dept: FAMILY MEDICINE CLINIC | Age: 64
End: 2024-12-12

## 2024-12-12 ENCOUNTER — PROCEDURE VISIT (OUTPATIENT)
Dept: PHYSICAL MEDICINE AND REHAB | Age: 64
End: 2024-12-12
Payer: MEDICARE

## 2024-12-12 VITALS
HEART RATE: 124 BPM | WEIGHT: 97 LBS | DIASTOLIC BLOOD PRESSURE: 74 MMHG | SYSTOLIC BLOOD PRESSURE: 134 MMHG | HEIGHT: 61 IN | BODY MASS INDEX: 18.31 KG/M2

## 2024-12-12 DIAGNOSIS — E61.1 IRON DEFICIENCY: ICD-10-CM

## 2024-12-12 DIAGNOSIS — E03.8 OTHER SPECIFIED HYPOTHYROIDISM: ICD-10-CM

## 2024-12-12 DIAGNOSIS — E61.1 IRON DEFICIENCY: Primary | ICD-10-CM

## 2024-12-12 DIAGNOSIS — F51.01 PRIMARY INSOMNIA: ICD-10-CM

## 2024-12-12 DIAGNOSIS — E83.52 HYPERCALCEMIA: ICD-10-CM

## 2024-12-12 DIAGNOSIS — S14.109S SPINAL CORD INJURY, CERVICAL REGION, SEQUELA (HCC): ICD-10-CM

## 2024-12-12 DIAGNOSIS — M81.0 POST-MENOPAUSAL OSTEOPOROSIS: ICD-10-CM

## 2024-12-12 DIAGNOSIS — R39.81 FUNCTIONAL URINARY INCONTINENCE: ICD-10-CM

## 2024-12-12 DIAGNOSIS — E87.6 HYPOKALEMIA: ICD-10-CM

## 2024-12-12 DIAGNOSIS — K21.9 GASTROESOPHAGEAL REFLUX DISEASE, UNSPECIFIED WHETHER ESOPHAGITIS PRESENT: ICD-10-CM

## 2024-12-12 DIAGNOSIS — G24.3 CERVICAL DYSTONIA: Primary | ICD-10-CM

## 2024-12-12 DIAGNOSIS — I50.32 CHRONIC DIASTOLIC HEART FAILURE (HCC): ICD-10-CM

## 2024-12-12 DIAGNOSIS — Z86.73 HISTORY OF STROKE: ICD-10-CM

## 2024-12-12 PROBLEM — R00.0 SINUS TACHYCARDIA: Status: RESOLVED | Noted: 2024-11-12 | Resolved: 2024-12-12

## 2024-12-12 PROBLEM — E83.51 HYPOCALCEMIA: Status: RESOLVED | Noted: 2022-01-02 | Resolved: 2024-12-12

## 2024-12-12 PROBLEM — K85.90 PANCREATITIS, UNSPECIFIED PANCREATITIS TYPE: Status: RESOLVED | Noted: 2024-11-01 | Resolved: 2024-12-12

## 2024-12-12 PROCEDURE — 95874 GUIDE NERV DESTR NEEDLE EMG: CPT | Performed by: PHYSICAL MEDICINE & REHABILITATION

## 2024-12-12 PROCEDURE — 64616 CHEMODENERV MUSC NECK DYSTON: CPT | Performed by: PHYSICAL MEDICINE & REHABILITATION

## 2024-12-12 RX ORDER — OXYBUTYNIN CHLORIDE 5 MG/1
5 TABLET, EXTENDED RELEASE ORAL DAILY
Qty: 30 TABLET | Refills: 0 | Status: CANCELLED | OUTPATIENT
Start: 2024-12-12

## 2024-12-12 RX ORDER — LEVOTHYROXINE SODIUM 100 UG/1
100 TABLET ORAL DAILY
Qty: 90 TABLET | Refills: 3 | Status: SHIPPED | OUTPATIENT
Start: 2024-12-12

## 2024-12-12 RX ORDER — QUETIAPINE FUMARATE 25 MG/1
25 TABLET, FILM COATED ORAL NIGHTLY
Qty: 30 TABLET | Refills: 1 | Status: SHIPPED | OUTPATIENT
Start: 2024-12-12 | End: 2024-12-13 | Stop reason: SDUPTHER

## 2024-12-12 RX ORDER — PANTOPRAZOLE SODIUM 40 MG/1
40 TABLET, DELAYED RELEASE ORAL DAILY
Qty: 30 TABLET | Refills: 2 | Status: SHIPPED | OUTPATIENT
Start: 2024-12-12 | End: 2024-12-13 | Stop reason: SDUPTHER

## 2024-12-12 RX ORDER — ROPINIROLE 1 MG/1
1 TABLET, FILM COATED ORAL NIGHTLY
Qty: 90 TABLET | Refills: 3 | Status: SHIPPED | OUTPATIENT
Start: 2024-12-12

## 2024-12-12 RX ORDER — CHOLECALCIFEROL (VITAMIN D3) 25 MCG
2000 TABLET ORAL DAILY
Qty: 30 TABLET | Refills: 0 | Status: SHIPPED | OUTPATIENT
Start: 2024-12-12

## 2024-12-12 RX ORDER — FOLIC ACID 1 MG/1
1 TABLET ORAL DAILY
Qty: 90 TABLET | Refills: 3 | Status: SHIPPED | OUTPATIENT
Start: 2024-12-12

## 2024-12-12 RX ORDER — ATORVASTATIN CALCIUM 40 MG/1
40 TABLET, FILM COATED ORAL DAILY
Qty: 90 TABLET | Refills: 3 | Status: SHIPPED | OUTPATIENT
Start: 2024-12-12

## 2024-12-12 RX ORDER — TAMSULOSIN HYDROCHLORIDE 0.4 MG/1
0.4 CAPSULE ORAL DAILY
Qty: 30 CAPSULE | Refills: 3 | Status: SHIPPED | OUTPATIENT
Start: 2024-12-12

## 2024-12-12 RX ORDER — DILTIAZEM HYDROCHLORIDE 240 MG/1
240 CAPSULE, COATED, EXTENDED RELEASE ORAL DAILY
Qty: 30 CAPSULE | Refills: 3 | Status: SHIPPED | OUTPATIENT
Start: 2024-12-12 | End: 2025-04-11

## 2024-12-12 SDOH — ECONOMIC STABILITY: FOOD INSECURITY: WITHIN THE PAST 12 MONTHS, THE FOOD YOU BOUGHT JUST DIDN'T LAST AND YOU DIDN'T HAVE MONEY TO GET MORE.: NEVER TRUE

## 2024-12-12 SDOH — ECONOMIC STABILITY: FOOD INSECURITY: WITHIN THE PAST 12 MONTHS, YOU WORRIED THAT YOUR FOOD WOULD RUN OUT BEFORE YOU GOT MONEY TO BUY MORE.: NEVER TRUE

## 2024-12-12 SDOH — ECONOMIC STABILITY: INCOME INSECURITY: HOW HARD IS IT FOR YOU TO PAY FOR THE VERY BASICS LIKE FOOD, HOUSING, MEDICAL CARE, AND HEATING?: NOT HARD AT ALL

## 2024-12-12 ASSESSMENT — ENCOUNTER SYMPTOMS
RHINORRHEA: 0
DIARRHEA: 0
ABDOMINAL DISTENTION: 0
ABDOMINAL PAIN: 0
SORE THROAT: 0
WHEEZING: 0
COUGH: 0
RECTAL PAIN: 0
SHORTNESS OF BREATH: 0
STRIDOR: 0
SINUS PRESSURE: 0
VOMITING: 0
BLOOD IN STOOL: 0
TROUBLE SWALLOWING: 0
CHEST TIGHTNESS: 0
CONSTIPATION: 0
EYE REDNESS: 0
BACK PAIN: 1
COLOR CHANGE: 0
NAUSEA: 0

## 2024-12-12 ASSESSMENT — PATIENT HEALTH QUESTIONNAIRE - PHQ9
SUM OF ALL RESPONSES TO PHQ QUESTIONS 1-9: 9
SUM OF ALL RESPONSES TO PHQ QUESTIONS 1-9: 9
1. LITTLE INTEREST OR PLEASURE IN DOING THINGS: NOT AT ALL
SUM OF ALL RESPONSES TO PHQ QUESTIONS 1-9: 9
5. POOR APPETITE OR OVEREATING: SEVERAL DAYS
SUM OF ALL RESPONSES TO PHQ QUESTIONS 1-9: 9
6. FEELING BAD ABOUT YOURSELF - OR THAT YOU ARE A FAILURE OR HAVE LET YOURSELF OR YOUR FAMILY DOWN: SEVERAL DAYS
4. FEELING TIRED OR HAVING LITTLE ENERGY: SEVERAL DAYS
SUM OF ALL RESPONSES TO PHQ9 QUESTIONS 1 & 2: 3
3. TROUBLE FALLING OR STAYING ASLEEP: NEARLY EVERY DAY
10. IF YOU CHECKED OFF ANY PROBLEMS, HOW DIFFICULT HAVE THESE PROBLEMS MADE IT FOR YOU TO DO YOUR WORK, TAKE CARE OF THINGS AT HOME, OR GET ALONG WITH OTHER PEOPLE: SOMEWHAT DIFFICULT
8. MOVING OR SPEAKING SO SLOWLY THAT OTHER PEOPLE COULD HAVE NOTICED. OR THE OPPOSITE, BEING SO FIGETY OR RESTLESS THAT YOU HAVE BEEN MOVING AROUND A LOT MORE THAN USUAL: NOT AT ALL
7. TROUBLE CONCENTRATING ON THINGS, SUCH AS READING THE NEWSPAPER OR WATCHING TELEVISION: NOT AT ALL
9. THOUGHTS THAT YOU WOULD BE BETTER OFF DEAD, OR OF HURTING YOURSELF: NOT AT ALL
2. FEELING DOWN, DEPRESSED OR HOPELESS: NEARLY EVERY DAY

## 2024-12-12 NOTE — ASSESSMENT & PLAN NOTE
Chronic problem continue vitamin D supplements    Orders:    vitamin D3 (CHOLECALCIFEROL) 25 MCG (1000 UT) TABS tablet; Take 2 tablets by mouth daily

## 2024-12-12 NOTE — PROGRESS NOTES
LUMBAR SPINE SURGERY N/A 12/30/2021    LUMBAR LAMINECTOMY L2-S1 performed by Linda Ludwig DO at [facility] OR    MASTECTOMY, RADICAL      OTHER SURGICAL HISTORY  04/12/2022    C5 CORPECTOMY, USE OF INTRAOPERATIVE CERVICAL TRACTION (N/A Spine Cervical)     SIGMOID COLECTOMY N/A 7/2/2024    LAPAROTOMY EXPLORATORY W/COLECTOMY, ILEOSTOMY RIGHT LOWER QUADRANT performed by Saad Bustillo DO at [facility] OR     Family History   Problem Relation Age of Onset    Hypertension Mother     Heart Disease Mother     Cancer Mother      Social History     Socioeconomic History    Marital status:    Tobacco Use    Smoking status: Former     Current packs/day: 0.00     Average packs/day: 0.5 packs/day for 30.0 years (15.0 ttl pk-yrs)     Types: Cigarettes     Start date: 1984     Quit date: 2014     Years since quitting: 10.9    Smokeless tobacco: Never   Vaping Use    Vaping status: Every Day    Substances: Nicotine   Substance and Sexual Activity    Alcohol use: Yes     Alcohol/week: 0.0 standard drinks of alcohol     Comment: weekend at times    Drug use: Yes     Types: Prescription     Comment: Methadone from [facility]     Social Determinants of Health     Financial Resource Strain: Low Risk  (12/12/2024)    Overall Financial Resource Strain (CARDIA)     Difficulty of Paying Living Expenses: Not hard at all   Food Insecurity: No Food Insecurity (12/12/2024)    Hunger Vital Sign     Worried About Running Out of Food in the Last Year: Never true     Ran Out of Food in the Last Year: Never true   Transportation Needs: No Transportation Needs (12/12/2024)    PRAPARE - Transportation     Lack of Transportation (Medical): No     Lack of Transportation (Non-Medical): No   Physical Activity: Insufficiently Active (3/24/2023)    Exercise Vital Sign     Days of Exercise per Week: 1 day     Minutes of Exercise per Session: 10 min   Stress: No Stress Concern Present (5/3/2022)    [employer] - Occupational

## 2024-12-12 NOTE — ASSESSMENT & PLAN NOTE
TSH is within normal range continue Synthroid    Orders:    levothyroxine (SYNTHROID) 100 MCG tablet; Take 1 tablet by mouth daily

## 2024-12-12 NOTE — ASSESSMENT & PLAN NOTE
Chronic problem continue statins continue work on risk factors    Orders:    atorvastatin (LIPITOR) 40 MG tablet; Take 1 tablet by mouth daily

## 2024-12-12 NOTE — ASSESSMENT & PLAN NOTE
Chronic from fairly stable continue current treatment    Orders:    rOPINIRole (REQUIP) 1 MG tablet; Take 1 tablet by mouth nightly

## 2024-12-12 NOTE — ASSESSMENT & PLAN NOTE
Chronic problem improving on the recent blood work.  Multiple myeloma workup ordered    Orders:    folic acid (FOLVITE) 1 MG tablet; Take 1 tablet by mouth daily

## 2024-12-12 NOTE — PROGRESS NOTES
to to empty her bladder.    Discussed with patient oxybutynin can cause a urinary retention      Hypokalemia that has improved on recent blood work    Overall blood work has improved except the hypercalcemia and mild MAYITO.      CHF stable, patient is currently only on diltiazem, she did had sinus tachycardia which has improved.  Patient's heart rate was running high in the hospital.  She is not on any antihypertensive medications.      GERD stable is on PPI.    Hypothyroidism, is currently on Synthroid.  Patient needs medication refills.      HPI  Review of Systems   Constitutional:  Negative for activity change, appetite change, fatigue, fever and unexpected weight change.   HENT:  Negative for congestion, ear pain, postnasal drip, rhinorrhea, sinus pressure, sore throat and trouble swallowing.    Eyes:  Negative for redness and visual disturbance.   Respiratory:  Negative for cough, chest tightness, shortness of breath, wheezing and stridor.    Cardiovascular:  Negative for chest pain, palpitations and leg swelling.   Gastrointestinal:  Negative for abdominal distention, abdominal pain, blood in stool, constipation, diarrhea, nausea, rectal pain and vomiting.   Endocrine: Negative for polyuria.   Genitourinary:  Positive for difficulty urinating. Negative for flank pain, frequency and urgency.   Musculoskeletal:  Positive for arthralgias, back pain, gait problem and myalgias. Negative for neck pain.   Skin:  Negative for color change, rash and wound.   Allergic/Immunologic: Negative for food allergies and immunocompromised state.   Neurological:  Positive for weakness and numbness. Negative for dizziness, speech difficulty, light-headedness and headaches.   Psychiatric/Behavioral:  Negative for agitation, behavioral problems, decreased concentration, dysphoric mood, hallucinations, sleep disturbance and suicidal ideas. The patient is nervous/anxious.           Objective   Patient-Reported Vitals  Patient-Reported

## 2024-12-12 NOTE — ASSESSMENT & PLAN NOTE
Improving on current treatment continue Seroquel    Orders:    QUEtiapine (SEROQUEL) 25 MG tablet; Take 1 tablet by mouth nightly

## 2024-12-13 DIAGNOSIS — R39.81 FUNCTIONAL URINARY INCONTINENCE: ICD-10-CM

## 2024-12-13 DIAGNOSIS — F51.01 PRIMARY INSOMNIA: ICD-10-CM

## 2024-12-13 DIAGNOSIS — K21.9 GASTROESOPHAGEAL REFLUX DISEASE, UNSPECIFIED WHETHER ESOPHAGITIS PRESENT: ICD-10-CM

## 2024-12-13 RX ORDER — PANTOPRAZOLE SODIUM 40 MG/1
40 TABLET, DELAYED RELEASE ORAL DAILY
Qty: 30 TABLET | Refills: 2 | Status: SHIPPED | OUTPATIENT
Start: 2024-12-13

## 2024-12-13 RX ORDER — QUETIAPINE FUMARATE 25 MG/1
25 TABLET, FILM COATED ORAL NIGHTLY
Qty: 30 TABLET | Refills: 1 | Status: SHIPPED | OUTPATIENT
Start: 2024-12-13

## 2024-12-13 RX ORDER — OXYBUTYNIN CHLORIDE 5 MG/1
5 TABLET, EXTENDED RELEASE ORAL DAILY
Qty: 30 TABLET | Refills: 0 | OUTPATIENT
Start: 2024-12-13

## 2024-12-13 NOTE — TELEPHONE ENCOUNTER
[facility] called and just wanted me to let you know the patients heart rate was staying around 125 and that it is new for her.

## 2024-12-13 NOTE — TELEPHONE ENCOUNTER
Please Approve or Refuse.  Send to Pharmacy per Pt's Request:      Next Visit Date:  Visit date not found   Last Visit Date: 12/12/2024    Hemoglobin A1C (%)   Date Value   12/18/2023 3.9 (L)             ( goal A1C is < 7)   BP Readings from Last 3 Encounters:   12/12/24 134/74   11/12/24 120/74   11/04/24 125/76          (goal 120/80)  BUN   Date Value Ref Range Status   11/25/2024 25 (H) 8 - 23 mg/dL Final     Creatinine   Date Value Ref Range Status   11/25/2024 1.1 (H) 0.6 - 0.9 mg/dL Final     Potassium   Date Value Ref Range Status   11/25/2024 4.3 3.7 - 5.3 mmol/L Final

## 2024-12-16 DIAGNOSIS — R39.81 FUNCTIONAL URINARY INCONTINENCE: ICD-10-CM

## 2024-12-16 RX ORDER — OXYBUTYNIN CHLORIDE 5 MG/1
5 TABLET, EXTENDED RELEASE ORAL DAILY
Qty: 30 TABLET | Refills: 0 | OUTPATIENT
Start: 2024-12-16

## 2024-12-24 ENCOUNTER — TELEPHONE (OUTPATIENT)
Dept: FAMILY MEDICINE CLINIC | Age: 64
End: 2024-12-24

## 2024-12-24 LAB
DX PRELIMINARY: NORMAL
GENE XXX MUT ANL BLD/T: NORMAL
PATHOLOGY STUDY: NORMAL
SPECIMEN SOURCE: NORMAL

## 2024-12-24 NOTE — TELEPHONE ENCOUNTER
Pilar from Home Health  called and wanted to let you know Ravi Heart rate is in the 130s and has been that way for at least a half hour that she's been there. She wanted to know what you think she should do. [phone]

## 2024-12-26 ENCOUNTER — HOSPITAL ENCOUNTER (OUTPATIENT)
Dept: ULTRASOUND IMAGING | Age: 64
Discharge: HOME | End: 2024-12-28
Payer: MEDICARE

## 2024-12-26 ENCOUNTER — HOSPITAL ENCOUNTER (OUTPATIENT)
Age: 64
Setting detail: SPECIMEN
Discharge: HOME | End: 2024-12-26

## 2024-12-26 ENCOUNTER — OFFICE VISIT (OUTPATIENT)
Dept: FAMILY MEDICINE CLINIC | Age: 64
End: 2024-12-26

## 2024-12-26 VITALS
HEART RATE: 127 BPM | SYSTOLIC BLOOD PRESSURE: 149 MMHG | WEIGHT: 97 LBS | OXYGEN SATURATION: 100 % | DIASTOLIC BLOOD PRESSURE: 94 MMHG | TEMPERATURE: 97.8 F | BODY MASS INDEX: 18.33 KG/M2

## 2024-12-26 DIAGNOSIS — R31.9 HEMATURIA, UNSPECIFIED TYPE: ICD-10-CM

## 2024-12-26 DIAGNOSIS — R39.198 DIFFICULTY URINATING: ICD-10-CM

## 2024-12-26 DIAGNOSIS — N30.01 ACUTE CYSTITIS WITH HEMATURIA: Primary | ICD-10-CM

## 2024-12-26 LAB
BILIRUBIN, POC: NEGATIVE
BLOOD URINE, POC: NORMAL
CLARITY, POC: NORMAL
COLOR, POC: NORMAL
GLUCOSE URINE, POC: NEGATIVE MG/DL
KETONES, POC: NEGATIVE MG/DL
LEUKOCYTE EST, POC: NORMAL
NITRITE, POC: POSITIVE
PH, POC: 6.5
PROTEIN, POC: 100 MG/DL
SPECIFIC GRAVITY, POC: 1.02
UROBILINOGEN, POC: 0.2 MG/DL

## 2024-12-26 PROCEDURE — 76775 US EXAM ABDO BACK WALL LIM: CPT | Performed by: FAMILY MEDICINE

## 2024-12-26 RX ORDER — CEPHALEXIN 500 MG/1
500 CAPSULE ORAL 2 TIMES DAILY
Qty: 14 CAPSULE | Refills: 0 | Status: SHIPPED | OUTPATIENT
Start: 2024-12-26 | End: 2025-01-02

## 2024-12-26 NOTE — PROGRESS NOTES
Vicky Lau MD  [facility] [place] [facility]  [address]  [address]  [facility] [address]  Dept: [phone]    Rajani Iglesias is a 64 y.o. female who presents today for their medical conditions/complaints as noted below.  Rajani Iglesias is here today c/o Hematuria (Here with hematuria. Unable to urinate. Has nurse that comes to home and hasn't gotten ahold of PCP. Frequent utis. ) and Hypertension (History of high blood pressure)       HPI:     HPI    Patient presents to the walk-in clinic with her  for evaluation of UTI symptoms   For the last few weeks she has had difficulty with bladder emptying, has had this before on a few occasions in the past, etiology unclear, on Flomax, does not follow with urology, states she has to press on her bladder to get it to empty and lately this has been worsening  For the last few days she has noticed pink tinge on toilet paper when wiping, today in clinic she had hector hematuria when giving us a urine sample, denies dizziness/lightheadedness  Occasional dysuria, describes sensation of suprapubic discomfort, urinary frequency, occasional urgency  Denies fever, nausea, vomiting, vaginal discharge, change in her chronic back pain  Admitted 11/1 for pancreatitis, in August had ileostomy placed for small bowel obstruction, ileostomy functioning okay    BP Readings from Last 3 Encounters:   12/26/24 (!) 149/94   12/12/24 134/74   11/12/24 120/74         Patient Active Problem List   Diagnosis    Stenosis of cervical spine with myelopathy (HCC)    Hypothyroidism    Lumbar radiculopathy, chronic    Post laminectomy syndrome    Hypokalemia    Post-menopausal osteoporosis    Chronic gastritis without bleeding    Elevated CEA    Ulcerative esophagitis    Weight loss, abnormal    Cervical myelopathy (HCC)    Lumbar stenosis with neurogenic claudication    Cauda equina compression (HCC)    Anemia    Iron

## 2024-12-26 NOTE — TELEPHONE ENCOUNTER
Pt usually runs above 100 , make sure pt is taking cardizem and recheck heart rate after 1 hr. Makes sure pt is well hydrated.

## 2024-12-27 DIAGNOSIS — R31.9 HEMATURIA, UNSPECIFIED TYPE: ICD-10-CM

## 2024-12-27 DIAGNOSIS — R39.198 DIFFICULTY URINATING: ICD-10-CM

## 2024-12-28 LAB
MICROORGANISM SPEC CULT: ABNORMAL
SERVICE CMNT-IMP: ABNORMAL
SPECIMEN DESCRIPTION: ABNORMAL

## 2025-01-02 ENCOUNTER — OFFICE VISIT (OUTPATIENT)
Dept: FAMILY MEDICINE CLINIC | Age: 65
End: 2025-01-02

## 2025-01-02 VITALS
TEMPERATURE: 97.3 F | WEIGHT: 91 LBS | HEART RATE: 126 BPM | BODY MASS INDEX: 17.18 KG/M2 | HEIGHT: 61 IN | DIASTOLIC BLOOD PRESSURE: 70 MMHG | SYSTOLIC BLOOD PRESSURE: 110 MMHG

## 2025-01-02 DIAGNOSIS — E44.0 MODERATE PROTEIN-CALORIE MALNUTRITION (HCC): ICD-10-CM

## 2025-01-02 DIAGNOSIS — I50.32 CHRONIC DIASTOLIC HEART FAILURE (HCC): ICD-10-CM

## 2025-01-02 DIAGNOSIS — E03.8 OTHER SPECIFIED HYPOTHYROIDISM: ICD-10-CM

## 2025-01-02 DIAGNOSIS — R00.0 SINUS TACHYCARDIA: ICD-10-CM

## 2025-01-02 DIAGNOSIS — F41.8 ANXIETY WITH DEPRESSION: ICD-10-CM

## 2025-01-02 DIAGNOSIS — R79.9 ABNORMAL BLOOD FINDING: Primary | ICD-10-CM

## 2025-01-02 DIAGNOSIS — Z12.31 ENCOUNTER FOR SCREENING MAMMOGRAM FOR BREAST CANCER: ICD-10-CM

## 2025-01-02 DIAGNOSIS — Z93.2 ILEOSTOMY IN PLACE (HCC): ICD-10-CM

## 2025-01-02 DIAGNOSIS — M48.02 STENOSIS OF CERVICAL SPINE WITH MYELOPATHY (HCC): ICD-10-CM

## 2025-01-02 DIAGNOSIS — G99.2 STENOSIS OF CERVICAL SPINE WITH MYELOPATHY (HCC): ICD-10-CM

## 2025-01-02 DIAGNOSIS — R63.4 WEIGHT LOSS, ABNORMAL: ICD-10-CM

## 2025-01-02 DIAGNOSIS — E83.52 HYPERCALCEMIA: ICD-10-CM

## 2025-01-02 PROBLEM — S14.109S: Status: RESOLVED | Noted: 2022-04-15 | Resolved: 2025-01-02

## 2025-01-02 PROBLEM — G95.9 CERVICAL MYELOPATHY (HCC): Status: RESOLVED | Noted: 2021-11-19 | Resolved: 2025-01-02

## 2025-01-02 PROBLEM — G83.4 CAUDA EQUINA COMPRESSION (HCC): Status: RESOLVED | Noted: 2021-12-30 | Resolved: 2025-01-02

## 2025-01-02 PROBLEM — G83.4 CAUDA EQUINA SYNDROME (HCC): Status: RESOLVED | Noted: 2022-01-04 | Resolved: 2025-01-02

## 2025-01-02 RX ORDER — METOPROLOL TARTRATE 25 MG/1
12.5 TABLET, FILM COATED ORAL 2 TIMES DAILY
Qty: 30 TABLET | Refills: 0 | Status: SHIPPED | OUTPATIENT
Start: 2025-01-02

## 2025-01-02 RX ORDER — HYDROXYZINE HYDROCHLORIDE 10 MG/1
10 TABLET, FILM COATED ORAL 2 TIMES DAILY PRN
Qty: 60 TABLET | Refills: 0 | Status: SHIPPED | OUTPATIENT
Start: 2025-01-02 | End: 2025-02-01

## 2025-01-02 SDOH — ECONOMIC STABILITY: INCOME INSECURITY: HOW HARD IS IT FOR YOU TO PAY FOR THE VERY BASICS LIKE FOOD, HOUSING, MEDICAL CARE, AND HEATING?: PATIENT DECLINED

## 2025-01-02 SDOH — ECONOMIC STABILITY: FOOD INSECURITY: WITHIN THE PAST 12 MONTHS, YOU WORRIED THAT YOUR FOOD WOULD RUN OUT BEFORE YOU GOT MONEY TO BUY MORE.: NEVER TRUE

## 2025-01-02 SDOH — ECONOMIC STABILITY: FOOD INSECURITY: WITHIN THE PAST 12 MONTHS, THE FOOD YOU BOUGHT JUST DIDN'T LAST AND YOU DIDN'T HAVE MONEY TO GET MORE.: NEVER TRUE

## 2025-01-02 ASSESSMENT — ENCOUNTER SYMPTOMS
DIARRHEA: 0
BACK PAIN: 1
CHEST TIGHTNESS: 0
RECTAL PAIN: 0
SORE THROAT: 0
SHORTNESS OF BREATH: 0
ABDOMINAL PAIN: 0
BLOOD IN STOOL: 0
NAUSEA: 0
VOMITING: 0
COUGH: 0
WHEEZING: 0
ABDOMINAL DISTENTION: 0
STRIDOR: 0
SINUS PRESSURE: 0
TROUBLE SWALLOWING: 0
RHINORRHEA: 0
EYE REDNESS: 0
COLOR CHANGE: 0
CONSTIPATION: 0

## 2025-01-02 ASSESSMENT — PATIENT HEALTH QUESTIONNAIRE - PHQ9
SUM OF ALL RESPONSES TO PHQ QUESTIONS 1-9: 10
2. FEELING DOWN, DEPRESSED OR HOPELESS: MORE THAN HALF THE DAYS
6. FEELING BAD ABOUT YOURSELF - OR THAT YOU ARE A FAILURE OR HAVE LET YOURSELF OR YOUR FAMILY DOWN: NOT AT ALL
5. POOR APPETITE OR OVEREATING: MORE THAN HALF THE DAYS
SUM OF ALL RESPONSES TO PHQ9 QUESTIONS 1 & 2: 4
3. TROUBLE FALLING OR STAYING ASLEEP: SEVERAL DAYS
10. IF YOU CHECKED OFF ANY PROBLEMS, HOW DIFFICULT HAVE THESE PROBLEMS MADE IT FOR YOU TO DO YOUR WORK, TAKE CARE OF THINGS AT HOME, OR GET ALONG WITH OTHER PEOPLE: SOMEWHAT DIFFICULT
1. LITTLE INTEREST OR PLEASURE IN DOING THINGS: MORE THAN HALF THE DAYS
SUM OF ALL RESPONSES TO PHQ QUESTIONS 1-9: 10
8. MOVING OR SPEAKING SO SLOWLY THAT OTHER PEOPLE COULD HAVE NOTICED. OR THE OPPOSITE, BEING SO FIGETY OR RESTLESS THAT YOU HAVE BEEN MOVING AROUND A LOT MORE THAN USUAL: NOT AT ALL
4. FEELING TIRED OR HAVING LITTLE ENERGY: SEVERAL DAYS
9. THOUGHTS THAT YOU WOULD BE BETTER OFF DEAD, OR OF HURTING YOURSELF: NOT AT ALL
7. TROUBLE CONCENTRATING ON THINGS, SUCH AS READING THE NEWSPAPER OR WATCHING TELEVISION: MORE THAN HALF THE DAYS

## 2025-01-02 ASSESSMENT — ANXIETY QUESTIONNAIRES
2. NOT BEING ABLE TO STOP OR CONTROL WORRYING: SEVERAL DAYS
IF YOU CHECKED OFF ANY PROBLEMS ON THIS QUESTIONNAIRE, HOW DIFFICULT HAVE THESE PROBLEMS MADE IT FOR YOU TO DO YOUR WORK, TAKE CARE OF THINGS AT HOME, OR GET ALONG WITH OTHER PEOPLE: EXTREMELY DIFFICULT
3. WORRYING TOO MUCH ABOUT DIFFERENT THINGS: SEVERAL DAYS
6. BECOMING EASILY ANNOYED OR IRRITABLE: SEVERAL DAYS
4. TROUBLE RELAXING: SEVERAL DAYS
7. FEELING AFRAID AS IF SOMETHING AWFUL MIGHT HAPPEN: SEVERAL DAYS
GAD7 TOTAL SCORE: 7
1. FEELING NERVOUS, ANXIOUS, OR ON EDGE: SEVERAL DAYS
5. BEING SO RESTLESS THAT IT IS HARD TO SIT STILL: SEVERAL DAYS

## 2025-01-02 NOTE — PROGRESS NOTES
vaccine  Aged Out    Meningococcal (ACWY) vaccine  Aged Out    Depression Screen  Discontinued    Diabetes screen  Discontinued               
tenderness, guarding or rebound.   Musculoskeletal:      Cervical back: Neck supple. Spasms present. No rigidity. Decreased range of motion.      Thoracic back: Spasms present. Decreased range of motion.      Lumbar back: Deformity, spasms and tenderness present. Decreased range of motion. Negative right straight leg raise test and negative left straight leg raise test.      Right lower leg: No edema.      Left lower leg: No edema.   Lymphadenopathy:      Cervical: No cervical adenopathy.   Skin:     Coloration: Skin is not jaundiced or pale.      Findings: No bruising, erythema or rash.   Neurological:      General: No focal deficit present.      Mental Status: She is alert and oriented to person, place, and time.      Cranial Nerves: No cranial nerve deficit.      Sensory: Sensory deficit present.      Motor: No weakness or tremor.      Coordination: Romberg sign negative. Coordination normal.      Gait: Gait and tandem walk normal.      Deep Tendon Reflexes: Reflexes are normal and symmetric.   Psychiatric:         Attention and Perception: Attention and perception normal. She is attentive.         Mood and Affect: Mood is anxious. Mood is not depressed. Affect is not tearful or inappropriate.         Speech: She is communicative. Speech is not rapid and pressured, delayed or slurred.         Behavior: Behavior is slowed. Behavior is not agitated.         Thought Content: Thought content normal.         Judgment: Judgment normal.             ASSESSMENT AND PLAN      1. Abnormal blood finding  Discussed with patient about the test results, referral placed to oncologist to rule out multiple myeloma and also to interpret gene mutations.  - Bhumika Dooley MD, Hematology/Oncology, [place]    2. Chronic diastolic heart failure (HCC)  Stable, referral placed to cardiologist.  - Barbi Pitts MD, Cardiology, [place]    3. Hypercalcemia  Persistent and worsening referral placed to oncologist rule out

## 2025-01-06 ENCOUNTER — HOSPITAL ENCOUNTER (OUTPATIENT)
Age: 65
Setting detail: SPECIMEN
Discharge: HOME | End: 2025-01-06

## 2025-01-06 LAB
CA-I BLD-SCNC: 1.31 MMOL/L (ref 1.13–1.33)
PTH-INTACT SERPL-MCNC: 10 PG/ML (ref 15–65)

## 2025-01-15 ENCOUNTER — OFFICE VISIT (OUTPATIENT)
Dept: PHYSICAL MEDICINE AND REHAB | Age: 65
End: 2025-01-15
Payer: MEDICARE

## 2025-01-15 VITALS
SYSTOLIC BLOOD PRESSURE: 98 MMHG | DIASTOLIC BLOOD PRESSURE: 61 MMHG | TEMPERATURE: 97.9 F | HEART RATE: 93 BPM | BODY MASS INDEX: 19.16 KG/M2 | WEIGHT: 101.4 LBS

## 2025-01-15 DIAGNOSIS — G24.3 CERVICAL DYSTONIA: Primary | ICD-10-CM

## 2025-01-15 PROCEDURE — G8420 CALC BMI NORM PARAMETERS: HCPCS | Performed by: PHYSICAL MEDICINE & REHABILITATION

## 2025-01-15 PROCEDURE — 3017F COLORECTAL CA SCREEN DOC REV: CPT | Performed by: PHYSICAL MEDICINE & REHABILITATION

## 2025-01-15 PROCEDURE — 99212 OFFICE O/P EST SF 10 MIN: CPT | Performed by: PHYSICAL MEDICINE & REHABILITATION

## 2025-01-15 PROCEDURE — 1036F TOBACCO NON-USER: CPT | Performed by: PHYSICAL MEDICINE & REHABILITATION

## 2025-01-15 PROCEDURE — G8427 DOCREV CUR MEDS BY ELIG CLIN: HCPCS | Performed by: PHYSICAL MEDICINE & REHABILITATION

## 2025-01-15 NOTE — PROGRESS NOTES
zoledronic acid (RECLAST) 5 MG/100ML SOLN Infuse 100 mLs intravenously once for 1 dose 100 mL 0    Handicap Placard MISC by Does not apply route Cannot walk 200 feet due to medical issues  Expires 7/14/2027 1 each 0     No current facility-administered medications for this visit.     Allergies   Allergen Reactions    Latex Hives, Itching, Dermatitis and Rash    Buspar [Buspirone] Other (See Comments)     Pt consistently declines this med, stating it makes her ears ring.    Kiwi Extract      Face swelling, some difficulty breathing    Crab Extract Itching     throat    Other      ALLERGIC TO KIWI EXTRACT       Subjective:      Review of Systems  Constitutional: Negative for fever, chills and unexpected weight change.   HENT: Negative for trouble swallowing.    Respiratory: Negative for cough and shortness of breath.    Cardiovascular: Negative for chest pain.   Endocrine: Negative for polyuria.   Genitourinary: Negative for dysuria, urgency, frequency, incontinence and difficulty urinating.   Gastrointestinal: Negative for constipation or diarrhea.   Musculoskeletal: Positive for arthralgias and myalgias  Neurological: Negative for headaches, numbness, or tingling.   Psychiatric: Negative for depressed mood or anxiety.       Objective:       Physical Exam  BP 98/61   Pulse 93   Temp 97.9 °F (36.6 °C)   Wt 46 kg (101 lb 6.4 oz)   BMI 19.16 kg/m²   Constitutional: She appears well-developed and well-nourished. In no distress.  HEENT: NCAT, PERRL, EOMI. Mucous membranes pink and moist.  Pulmonary/Chest: Respirations WNL and unlabored.   MSK: Functional ROM impaired cervical spine but with improved lateral rotation to 45 degrees R and left. B SCM and trapezius with significantly reduced tone to palpation. Lateral and anterocollis are improved on exam today  Neurological: She is alert and oriented to person, place, and time.  Skin: Skin is warm dry and intact with good turgor.   Psychiatric: She has a normal mood and  Erythromycin Pregnancy And Lactation Text: This medication is Pregnancy Category B and is considered safe during pregnancy. It is also excreted in breast milk.

## 2025-01-21 ENCOUNTER — TELEPHONE (OUTPATIENT)
Dept: ONCOLOGY | Age: 65
End: 2025-01-21

## 2025-01-21 ENCOUNTER — HOSPITAL ENCOUNTER (OUTPATIENT)
Age: 65
Discharge: HOME | End: 2025-01-21
Payer: MEDICARE

## 2025-01-21 ENCOUNTER — INITIAL CONSULT (OUTPATIENT)
Dept: ONCOLOGY | Age: 65
End: 2025-01-21
Payer: MEDICARE

## 2025-01-21 VITALS
HEIGHT: 61 IN | BODY MASS INDEX: 19.45 KG/M2 | WEIGHT: 103 LBS | TEMPERATURE: 98 F | HEART RATE: 110 BPM | DIASTOLIC BLOOD PRESSURE: 80 MMHG | SYSTOLIC BLOOD PRESSURE: 133 MMHG

## 2025-01-21 DIAGNOSIS — D64.9 NORMOCYTIC ANEMIA: ICD-10-CM

## 2025-01-21 DIAGNOSIS — E83.52 HYPERCALCEMIA: ICD-10-CM

## 2025-01-21 DIAGNOSIS — D64.9 NORMOCYTIC ANEMIA: Primary | ICD-10-CM

## 2025-01-21 DIAGNOSIS — Z85.3 HISTORY OF BREAST CANCER: ICD-10-CM

## 2025-01-21 LAB
BASOPHILS # BLD: 0.03 K/UL (ref 0–0.2)
BASOPHILS NFR BLD: 1 % (ref 0–2)
EOSINOPHIL # BLD: 0.08 K/UL (ref 0–0.44)
EOSINOPHILS RELATIVE PERCENT: 1 % (ref 1–4)
ERYTHROCYTE [DISTWIDTH] IN BLOOD BY AUTOMATED COUNT: 11.9 % (ref 11.8–14.4)
FERRITIN SERPL-MCNC: 53 NG/ML
FOLATE SERPL-MCNC: >40 NG/ML (ref 4.8–24.2)
FREE KAPPA/LAMBDA RATIO: 1.41 (ref 0.22–1.74)
HAPTOGLOB SERPL-MCNC: 186 MG/DL (ref 30–200)
HCT VFR BLD AUTO: 37.3 % (ref 36.3–47.1)
HGB BLD-MCNC: 12.3 G/DL (ref 11.9–15.1)
IGA SERPL-MCNC: 498 MG/DL (ref 70–400)
IGG SERPL-MCNC: 2185 MG/DL (ref 700–1600)
IGM SERPL-MCNC: 94 MG/DL (ref 40–230)
IMM GRANULOCYTES # BLD AUTO: 0.04 K/UL (ref 0–0.3)
IMM GRANULOCYTES NFR BLD: 1 %
KAPPA LC FREE SER-MCNC: 75.4 MG/L
LAMBDA LC FREE SERPL-MCNC: 53.4 MG/L (ref 4.2–27.7)
LDH SERPL-CCNC: 227 U/L (ref 135–214)
LYMPHOCYTES NFR BLD: 1.22 K/UL (ref 1.1–3.7)
LYMPHOCYTES RELATIVE PERCENT: 20 % (ref 24–43)
MCH RBC QN AUTO: 32.7 PG (ref 25.2–33.5)
MCHC RBC AUTO-ENTMCNC: 33 G/DL (ref 28.4–34.8)
MCV RBC AUTO: 99.2 FL (ref 82.6–102.9)
MONOCYTES NFR BLD: 0.41 K/UL (ref 0.1–1.2)
MONOCYTES NFR BLD: 7 % (ref 3–12)
NEUTROPHILS NFR BLD: 70 % (ref 36–65)
NEUTS SEG NFR BLD: 4.36 K/UL (ref 1.5–8.1)
NRBC BLD-RTO: 0 PER 100 WBC
PLATELET # BLD AUTO: 301 K/UL (ref 138–453)
PMV BLD AUTO: 10.5 FL (ref 8.1–13.5)
RBC # BLD AUTO: 3.76 M/UL (ref 3.95–5.11)
RETICS # AUTO: 0.04 M/UL (ref 0.02–0.1)
RETICS/RBC NFR AUTO: 1 % (ref 0.5–2)
VIT B12 SERPL-MCNC: 622 PG/ML (ref 232–1245)
WBC OTHER # BLD: 6.1 K/UL (ref 3.5–11.3)

## 2025-01-21 PROCEDURE — 82607 VITAMIN B-12: CPT

## 2025-01-21 PROCEDURE — 36415 COLL VENOUS BLD VENIPUNCTURE: CPT

## 2025-01-21 PROCEDURE — 82668 ASSAY OF ERYTHROPOIETIN: CPT

## 2025-01-21 PROCEDURE — 84166 PROTEIN E-PHORESIS/URINE/CSF: CPT

## 2025-01-21 PROCEDURE — 86334 IMMUNOFIX E-PHORESIS SERUM: CPT

## 2025-01-21 PROCEDURE — 82728 ASSAY OF FERRITIN: CPT

## 2025-01-21 PROCEDURE — 83521 IG LIGHT CHAINS FREE EACH: CPT

## 2025-01-21 PROCEDURE — 85045 AUTOMATED RETICULOCYTE COUNT: CPT

## 2025-01-21 PROCEDURE — 99205 OFFICE O/P NEW HI 60 MIN: CPT | Performed by: INTERNAL MEDICINE

## 2025-01-21 PROCEDURE — 84165 PROTEIN E-PHORESIS SERUM: CPT

## 2025-01-21 PROCEDURE — 85027 COMPLETE CBC AUTOMATED: CPT

## 2025-01-21 PROCEDURE — 83615 LACTATE (LD) (LDH) ENZYME: CPT

## 2025-01-21 PROCEDURE — G8420 CALC BMI NORM PARAMETERS: HCPCS | Performed by: INTERNAL MEDICINE

## 2025-01-21 PROCEDURE — 86335 IMMUNFIX E-PHORSIS/URINE/CSF: CPT

## 2025-01-21 PROCEDURE — 83010 ASSAY OF HAPTOGLOBIN QUANT: CPT

## 2025-01-21 PROCEDURE — 84155 ASSAY OF PROTEIN SERUM: CPT

## 2025-01-21 PROCEDURE — 82746 ASSAY OF FOLIC ACID SERUM: CPT

## 2025-01-21 PROCEDURE — 82784 ASSAY IGA/IGD/IGG/IGM EACH: CPT

## 2025-01-21 PROCEDURE — G8427 DOCREV CUR MEDS BY ELIG CLIN: HCPCS | Performed by: INTERNAL MEDICINE

## 2025-01-21 PROCEDURE — 84156 ASSAY OF PROTEIN URINE: CPT

## 2025-01-21 NOTE — TELEPHONE ENCOUNTER
AVS 01/21/25    Labs today and bone marrow  Rtc next friday    Referral to Interventional Radiology    Labs ordered today  Erythropoietin  Complete this on or around 1/21/2025.  Ferritin  Complete this on or around 1/21/2025.  Haptoglobin  Complete this on or around 1/21/2025.  Lactate Dehydrogenase  Complete this on or around 1/21/2025.  Path Review, Smear  Complete this on or around 1/21/2025.  Protein Electrophoresis, Urine  Complete this on or around 1/21/2025.  Reticulocytes  Complete this on or around 1/21/2025.  Vitamin B12 & Folate  Complete this on or around 1/21/2025.  Electrophoresis Protein, Serum  Complete this on or around 1/28/2025.  Immunoglobulin Panel (IgG, IgA, IgM)  Complete this on or around 1/28/2025.  Crandall/Lambda Quantitative Free Light Chains, Serum  Complete this on or around 1/28/2025.    RV 01/31/25    Labs to be done today    Pt received referral for IR for bone bx, pt is aware that IR will reach out to pt to schedule     PT was given AVS and appt schedule    Electronically signed by Tyra Posey on 1/21/2025 at 12:07 PM

## 2025-01-21 NOTE — PROGRESS NOTES
Wellness examination     Dr. MARY LOU Brasher       Past Surgical History:   Procedure Laterality Date    BACK SURGERY      lower back x 3, cervical- x 1: C4- C6, 11/4/2014     BREAST SURGERY Right     mastectomy with reconstruction    CERVICAL FUSION N/A 4/12/2022    C5 CORPECTOMY, USE OF INTRAOPERATIVE CERVICAL TRACTION performed by Linda Ludwig DO at [facility] OR    CERVICAL FUSION N/A 4/12/2022    POSTERIOR FIXATION C2-C7 performed by Linda Ludwig DO at [facility] OR    COLONOSCOPY  about 2018    HERNIA REPAIR Bilateral     inguinal    HYSTERECTOMY, TOTAL ABDOMINAL (CERVIX REMOVED)      LUMBAR SPINE SURGERY N/A 12/30/2021    LUMBAR LAMINECTOMY L2-S1 performed by Linda Ludwig DO at [facility] OR    MASTECTOMY, RADICAL      OTHER SURGICAL HISTORY  04/12/2022    C5 CORPECTOMY, USE OF INTRAOPERATIVE CERVICAL TRACTION (N/A Spine Cervical)     SIGMOID COLECTOMY N/A 7/2/2024    LAPAROTOMY EXPLORATORY W/COLECTOMY, ILEOSTOMY RIGHT LOWER QUADRANT performed by Saad Bustillo DO at [facility] OR       Allergies   Allergen Reactions    Latex Hives, Itching, Dermatitis and Rash    Buspar [Buspirone] Other (See Comments)     Pt consistently declines this med, stating it makes her ears ring.    Kiwi Extract      Face swelling, some difficulty breathing    Crab Extract Itching     throat    Other      ALLERGIC TO KIWI EXTRACT       Current Outpatient Medications   Medication Sig Dispense Refill    hydrOXYzine HCl (ATARAX) 10 MG tablet Take 1 tablet by mouth 2 times daily as needed for Itching 60 tablet 0    metoprolol tartrate (LOPRESSOR) 25 MG tablet Take 0.5 tablets by mouth 2 times daily 30 tablet 0    QUEtiapine (SEROQUEL) 25 MG tablet Take 1 tablet by mouth nightly 30 tablet 1    pantoprazole (PROTONIX) 40 MG tablet Take 1 tablet by mouth daily 30 tablet 2    tamsulosin (FLOMAX) 0.4 MG capsule Take 1 capsule by mouth daily 30 capsule 3    rOPINIRole (REQUIP) 1 MG tablet Take 1 tablet by mouth nightly 90 tablet 3    levothyroxine (SYNTHROID)

## 2025-01-22 LAB
ALBUMIN PERCENT: 49 % (ref 45–65)
ALBUMIN SERPL-MCNC: 4.2 G/DL (ref 3.2–5.2)
ALPHA 2 PERCENT: 10 % (ref 6–13)
ALPHA1 GLOB SERPL ELPH-MCNC: 0.4 G/DL (ref 0.1–0.4)
ALPHA1 GLOB SERPL ELPH-MCNC: 4 % (ref 3–6)
ALPHA2 GLOB SERPL ELPH-MCNC: 0.9 G/DL (ref 0.5–0.9)
B-GLOBULIN SERPL ELPH-MCNC: 1.2 G/DL (ref 0.5–1.1)
B-GLOBULIN SERPL ELPH-MCNC: 14 % (ref 11–19)
GAMMA GLOB SERPL ELPH-MCNC: 1.9 G/DL (ref 0.5–1.5)
GAMMA GLOBULIN %: 23 % (ref 9–20)
ITYP INTERPRETATION: NORMAL
ITYP INTERPRETATION: NORMAL
P E INTERPRETATION, U: NORMAL
PATH REV BLD -IMP: NORMAL
PATH REV: NORMAL
PATHOLOGIST REVIEW: NORMAL
PATHOLOGIST: ABNORMAL
PATHOLOGIST: NORMAL
PROT PATTERN SERPL ELPH-IMP: ABNORMAL
PROT SERPL-MCNC: 8.5 G/DL (ref 6.6–8.7)
SPECIMEN TYPE: NORMAL
SPECIMEN TYPE: NORMAL
SPECIMEN VOL UR: NORMAL ML
SURGICAL PATHOLOGY REPORT: NORMAL
TOTAL PROT. SUM,%: 100 % (ref 98–102)
TOTAL PROT. SUM: 8.6 G/DL (ref 6.3–8.2)
TOTAL PROTEIN, URINE: 55 MG/DL
URINE TOTAL PROTEIN: 54 MG/DL

## 2025-01-23 LAB — EPO SERPL-ACNC: 8 MU/ML (ref 4–27)

## 2025-01-27 ENCOUNTER — TELEPHONE (OUTPATIENT)
Dept: FAMILY MEDICINE CLINIC | Age: 65
End: 2025-01-27

## 2025-01-27 ENCOUNTER — HOSPITAL ENCOUNTER (OUTPATIENT)
Age: 65
Setting detail: SPECIMEN
Discharge: HOME | End: 2025-01-27

## 2025-01-27 DIAGNOSIS — R00.0 SINUS TACHYCARDIA: ICD-10-CM

## 2025-01-27 DIAGNOSIS — F41.8 ANXIETY WITH DEPRESSION: ICD-10-CM

## 2025-01-27 DIAGNOSIS — R30.0 DYSURIA: ICD-10-CM

## 2025-01-27 DIAGNOSIS — R30.0 DYSURIA: Primary | ICD-10-CM

## 2025-01-27 LAB
BACTERIA URNS QL MICRO: ABNORMAL
BILIRUB UR QL STRIP: NEGATIVE
CASTS #/AREA URNS LPF: ABNORMAL /LPF (ref 0–8)
CLARITY UR: ABNORMAL
COLOR UR: YELLOW
EPI CELLS #/AREA URNS HPF: ABNORMAL /HPF (ref 0–5)
GLUCOSE UR STRIP-MCNC: NEGATIVE MG/DL
HGB UR QL STRIP.AUTO: ABNORMAL
KETONES UR STRIP-MCNC: NEGATIVE MG/DL
LEUKOCYTE ESTERASE UR QL STRIP: ABNORMAL
NITRITE UR QL STRIP: POSITIVE
PH UR STRIP: 6.5 [PH] (ref 5–8)
PROT UR STRIP-MCNC: ABNORMAL MG/DL
RBC #/AREA URNS HPF: ABNORMAL /HPF (ref 0–4)
SP GR UR STRIP: 1.02 (ref 1–1.03)
UROBILINOGEN UR STRIP-ACNC: NORMAL EU/DL (ref 0–1)
WBC #/AREA URNS HPF: ABNORMAL /HPF (ref 0–5)

## 2025-01-27 RX ORDER — HYDROXYZINE HYDROCHLORIDE 10 MG/1
10 TABLET, FILM COATED ORAL 2 TIMES DAILY PRN
Qty: 60 TABLET | Refills: 0 | Status: SHIPPED | OUTPATIENT
Start: 2025-01-27 | End: 2025-02-26

## 2025-01-27 RX ORDER — METOPROLOL TARTRATE 25 MG/1
12.5 TABLET, FILM COATED ORAL 2 TIMES DAILY
Qty: 30 TABLET | Refills: 0 | Status: SHIPPED | OUTPATIENT
Start: 2025-01-27

## 2025-01-27 RX ORDER — METOPROLOL TARTRATE 25 MG/1
TABLET, FILM COATED ORAL
Qty: 30 TABLET | Refills: 0 | Status: SHIPPED | OUTPATIENT
Start: 2025-01-27

## 2025-01-27 RX ORDER — HYDROXYZINE HYDROCHLORIDE 10 MG/1
TABLET, FILM COATED ORAL
Qty: 60 TABLET | Refills: 0 | Status: SHIPPED | OUTPATIENT
Start: 2025-01-27

## 2025-01-27 NOTE — TELEPHONE ENCOUNTER
Pilar nurse from [facility] called back and was requesting refills on these 2 medications though they will be refilled on 2/1/25.    Pharmacy is correct.    Thank you.

## 2025-01-27 NOTE — TELEPHONE ENCOUNTER
Called and spoke to Pilar.     She had left Rajani's house but is still in the area and will see if she can pee for her and if not she will go to her closest lab to leave a sample.    Thank you.

## 2025-01-27 NOTE — TELEPHONE ENCOUNTER
Pilar, for [place] called.    She was at Rajani's house and Rajani stated she feels like she has another UTI.    Frequently feeling like she has to go is her symptom.    Could an order be written?    Pilar could be called about an update as well as Rajani for an order.    Thank you.

## 2025-01-27 NOTE — TELEPHONE ENCOUNTER
Please Approve or Refuse.  Send to Pharmacy per Pt's Request: [facility]     Next Visit Date:  1/27/2025   Last Visit Date: 1/2/2025    Hemoglobin A1C (%)   Date Value   12/18/2023 3.9 (L)             ( goal A1C is < 7)   BP Readings from Last 3 Encounters:   01/21/25 133/80   01/15/25 98/61   01/02/25 110/70          (goal 120/80)  BUN   Date Value Ref Range Status   11/25/2024 25 (H) 8 - 23 mg/dL Final     Creatinine   Date Value Ref Range Status   11/25/2024 1.1 (H) 0.6 - 0.9 mg/dL Final     Potassium   Date Value Ref Range Status   11/25/2024 4.3 3.7 - 5.3 mmol/L Final

## 2025-01-29 DIAGNOSIS — N30.01 ACUTE CYSTITIS WITH HEMATURIA: Primary | ICD-10-CM

## 2025-01-29 LAB
MICROORGANISM SPEC CULT: ABNORMAL
SPECIMEN DESCRIPTION: ABNORMAL

## 2025-01-29 RX ORDER — CEPHALEXIN 500 MG/1
500 CAPSULE ORAL 2 TIMES DAILY
Qty: 20 CAPSULE | Refills: 0 | Status: SHIPPED | OUTPATIENT
Start: 2025-01-29

## 2025-01-31 ENCOUNTER — OFFICE VISIT (OUTPATIENT)
Dept: ONCOLOGY | Age: 65
End: 2025-01-31
Payer: MEDICARE

## 2025-01-31 ENCOUNTER — OUTSIDE SERVICES (OUTPATIENT)
Dept: FAMILY MEDICINE CLINIC | Age: 65
End: 2025-01-31
Payer: MEDICARE

## 2025-01-31 ENCOUNTER — CARE COORDINATION (OUTPATIENT)
Dept: CARE COORDINATION | Age: 65
End: 2025-01-31

## 2025-01-31 ENCOUNTER — TELEPHONE (OUTPATIENT)
Dept: ONCOLOGY | Age: 65
End: 2025-01-31

## 2025-01-31 VITALS
TEMPERATURE: 98.2 F | OXYGEN SATURATION: 99 % | DIASTOLIC BLOOD PRESSURE: 78 MMHG | SYSTOLIC BLOOD PRESSURE: 123 MMHG | HEIGHT: 61 IN | WEIGHT: 109 LBS | HEART RATE: 101 BPM | BODY MASS INDEX: 20.58 KG/M2

## 2025-01-31 DIAGNOSIS — I50.32 CHRONIC DIASTOLIC HEART FAILURE (HCC): ICD-10-CM

## 2025-01-31 DIAGNOSIS — D89.0 POLYCLONAL GAMMOPATHY DETERMINED BY SERUM PROTEIN ELECTROPHORESIS: ICD-10-CM

## 2025-01-31 DIAGNOSIS — M48.02 STENOSIS OF CERVICAL SPINE WITH MYELOPATHY (HCC): Primary | ICD-10-CM

## 2025-01-31 DIAGNOSIS — E44.0 MODERATE PROTEIN-CALORIE MALNUTRITION (HCC): ICD-10-CM

## 2025-01-31 DIAGNOSIS — G99.2 STENOSIS OF CERVICAL SPINE WITH MYELOPATHY (HCC): Primary | ICD-10-CM

## 2025-01-31 DIAGNOSIS — D64.9 NORMOCYTIC ANEMIA: Primary | ICD-10-CM

## 2025-01-31 PROCEDURE — G8420 CALC BMI NORM PARAMETERS: HCPCS | Performed by: INTERNAL MEDICINE

## 2025-01-31 PROCEDURE — 1036F TOBACCO NON-USER: CPT | Performed by: INTERNAL MEDICINE

## 2025-01-31 PROCEDURE — G8427 DOCREV CUR MEDS BY ELIG CLIN: HCPCS | Performed by: INTERNAL MEDICINE

## 2025-01-31 PROCEDURE — 3017F COLORECTAL CA SCREEN DOC REV: CPT | Performed by: INTERNAL MEDICINE

## 2025-01-31 PROCEDURE — 99214 OFFICE O/P EST MOD 30 MIN: CPT | Performed by: INTERNAL MEDICINE

## 2025-01-31 NOTE — PROGRESS NOTES
This patient is currently under my care and considered medically homebound, requiring skilled Home Health services. I have reviewed the patient's status and plan of care.     Has the patient been seen within the last 90 days?  yes, 1/02/25    Time spent completing forms?  10

## 2025-01-31 NOTE — PROGRESS NOTES
Patient ID: Rajani Iglesias, 1960, [id number], 64 y.o.  Referred by :  No ref. provider found   Reason for consultation: Normocytic anemia/high calcium      HISTORY OF PRESENT ILLNESS:    Oncologic History:    Rajani Iglesias is a very pleasant 64 y.o. female.  Was referred for anemia and macrocytosis, CBC done on 11/25/2024 and did show WBC 8.5 hemoglobin 11.7  platelet count at 345  Calcium was elevated at 11.5 however previous calcium was 7.7  Creatinine elevated at 1.1 with BUN of 25  Myeloid malignancy mutation panel NGS was done as below      Myeloid Malignancies Mutation Panel NGS     Submitted diagnosis or diagnosis under consideration for variant   interpretation: Not provided     TIER 1: Variants of Known Clinical Significance in Hematologic   Malignancies     1. ASXL1 c.1934dup, p.[id number] ([id number])   VAF: 5.5%     2. TET2 c.5618T>C, p.[id number] ([id number])   VAF: 7.0%   Malignancies     1. CBLB c.815G>A, p.Wey720Hco ([id number])   VAF: 45.9%   This variant has been rarely reported in hematologic malignancies   (1), to the best of our knowledge.     Interval history  Myeloma workup was done and no M spike but there is elevated beta globulin  Still have a high IgA and IgG  Hemoglobin improved to 12.3  EPO level at 8 and normal iron panel  During this visit patient's allergy, social, medical, surgical history and medications were reviewed and updated.          Past Medical History:   Diagnosis Date    Anxiety     Arthritis     At maximum risk for fall 12/29/2021    caudal equina syndrome and cervical stenosis    Breast cancer (HCC)     Brief psychotic disorder (HCC) 01/05/2024    Cancer (HCC)     right breast    Cauda equina syndrome (HCC) 12/29/2021    neuropathy, mobility difficulty, incontinance    Cerebrovascular accident (HCC) 01/01/2024    Cervical stenosis of spine 12/29/2021    GERD (gastroesophageal reflux disease)     History of stomach ulcers     History of therapeutic

## 2025-01-31 NOTE — TELEPHONE ENCOUNTER
AVS 01/31/25    Rtc in 4 weeks    RV 03/07/25    Pt elected to access avs & schedule on [employer]    Electronically signed by Tyra Posey on 1/31/2025 at 11:28 AM

## 2025-02-03 ENCOUNTER — OFFICE VISIT (OUTPATIENT)
Dept: FAMILY MEDICINE CLINIC | Age: 65
End: 2025-02-03
Payer: MEDICARE

## 2025-02-03 VITALS
HEIGHT: 60 IN | BODY MASS INDEX: 21.6 KG/M2 | OXYGEN SATURATION: 98 % | HEART RATE: 94 BPM | DIASTOLIC BLOOD PRESSURE: 70 MMHG | WEIGHT: 110 LBS | SYSTOLIC BLOOD PRESSURE: 110 MMHG

## 2025-02-03 DIAGNOSIS — R63.4 WEIGHT LOSS, ABNORMAL: ICD-10-CM

## 2025-02-03 DIAGNOSIS — Z00.00 MEDICARE ANNUAL WELLNESS VISIT, SUBSEQUENT: Primary | ICD-10-CM

## 2025-02-03 DIAGNOSIS — Z01.00 ROUTINE EYE EXAM: ICD-10-CM

## 2025-02-03 DIAGNOSIS — E03.8 OTHER SPECIFIED HYPOTHYROIDISM: ICD-10-CM

## 2025-02-03 DIAGNOSIS — D89.0 POLYCLONAL GAMMOPATHY: ICD-10-CM

## 2025-02-03 DIAGNOSIS — Z93.2 ILEOSTOMY IN PLACE (HCC): ICD-10-CM

## 2025-02-03 PROBLEM — Z86.73 HISTORY OF STROKE: Status: RESOLVED | Noted: 2024-06-20 | Resolved: 2025-02-03

## 2025-02-03 PROBLEM — R00.0 SINUS TACHYCARDIA: Status: RESOLVED | Noted: 2024-11-12 | Resolved: 2025-02-03

## 2025-02-03 PROCEDURE — G8420 CALC BMI NORM PARAMETERS: HCPCS | Performed by: FAMILY MEDICINE

## 2025-02-03 PROCEDURE — 1036F TOBACCO NON-USER: CPT | Performed by: FAMILY MEDICINE

## 2025-02-03 PROCEDURE — 3017F COLORECTAL CA SCREEN DOC REV: CPT | Performed by: FAMILY MEDICINE

## 2025-02-03 PROCEDURE — 99213 OFFICE O/P EST LOW 20 MIN: CPT | Performed by: FAMILY MEDICINE

## 2025-02-03 PROCEDURE — G8427 DOCREV CUR MEDS BY ELIG CLIN: HCPCS | Performed by: FAMILY MEDICINE

## 2025-02-03 PROCEDURE — G0439 PPPS, SUBSEQ VISIT: HCPCS | Performed by: FAMILY MEDICINE

## 2025-02-03 ASSESSMENT — PATIENT HEALTH QUESTIONNAIRE - PHQ9
1. LITTLE INTEREST OR PLEASURE IN DOING THINGS: NOT AT ALL
10. IF YOU CHECKED OFF ANY PROBLEMS, HOW DIFFICULT HAVE THESE PROBLEMS MADE IT FOR YOU TO DO YOUR WORK, TAKE CARE OF THINGS AT HOME, OR GET ALONG WITH OTHER PEOPLE: NOT DIFFICULT AT ALL
2. FEELING DOWN, DEPRESSED OR HOPELESS: NOT AT ALL
6. FEELING BAD ABOUT YOURSELF - OR THAT YOU ARE A FAILURE OR HAVE LET YOURSELF OR YOUR FAMILY DOWN: NOT AT ALL
SUM OF ALL RESPONSES TO PHQ9 QUESTIONS 1 & 2: 0
5. POOR APPETITE OR OVEREATING: NOT AT ALL
SUM OF ALL RESPONSES TO PHQ QUESTIONS 1-9: 0
SUM OF ALL RESPONSES TO PHQ QUESTIONS 1-9: 0
9. THOUGHTS THAT YOU WOULD BE BETTER OFF DEAD, OR OF HURTING YOURSELF: NOT AT ALL
8. MOVING OR SPEAKING SO SLOWLY THAT OTHER PEOPLE COULD HAVE NOTICED. OR THE OPPOSITE, BEING SO FIGETY OR RESTLESS THAT YOU HAVE BEEN MOVING AROUND A LOT MORE THAN USUAL: NOT AT ALL
4. FEELING TIRED OR HAVING LITTLE ENERGY: NOT AT ALL
SUM OF ALL RESPONSES TO PHQ QUESTIONS 1-9: 0
SUM OF ALL RESPONSES TO PHQ QUESTIONS 1-9: 0
7. TROUBLE CONCENTRATING ON THINGS, SUCH AS READING THE NEWSPAPER OR WATCHING TELEVISION: NOT AT ALL
3. TROUBLE FALLING OR STAYING ASLEEP: NOT AT ALL

## 2025-02-03 ASSESSMENT — LIFESTYLE VARIABLES
HOW MANY STANDARD DRINKS CONTAINING ALCOHOL DO YOU HAVE ON A TYPICAL DAY: PATIENT DOES NOT DRINK
HOW OFTEN DO YOU HAVE A DRINK CONTAINING ALCOHOL: NEVER

## 2025-02-03 NOTE — PATIENT INSTRUCTIONS
Substance Use Disorder: Care Instructions  Overview     You can improve your life and health by stopping your use of alcohol or drugs. When you don't drink or use drugs, you may feel and sleep better. You may get along better with your family, friends, and coworkers. There are medicines and programs that can help with substance use disorder.  How can you care for yourself at home?  Here are some ways to help you stay sober and prevent relapse.  If you have been given medicine to help keep you sober or reduce your cravings, be sure to take it exactly as prescribed.  Talk to your doctor about programs that can help you stop using drugs or drinking alcohol.  Do not keep alcohol or drugs in your home.  Plan ahead. Think about what you'll say if other people ask you to drink or use drugs. Try not to spend time with people who drink or use drugs.  Use the time and money spent on drinking or drugs to do something that's important to you.  Preventing a relapse  Have a plan to deal with relapse. Learn to recognize changes in your thinking that lead you to drink or use drugs. Get help before you start to drink or use drugs again.  Try to stay away from situations, friends, or places that may lead you to drink or use drugs.  If you feel the need to drink alcohol or use drugs again, seek help right away. Call a trusted friend or family member. Some people get support from organizations such as Narcotics Anonymous or [employer] or from treatment facilities.  If you relapse, get help as soon as you can. Some people make a plan with another person that outlines what they want that person to do for them if they relapse. The plan usually includes how to handle the relapse and who to notify in case of relapse.  Don't give up. Remember that a relapse doesn't mean that you have failed. Use the experience to learn the triggers that lead you to drink or use drugs. Then quit again. Recovery is a lifelong process. Many people have

## 2025-02-03 NOTE — PROGRESS NOTES
Labs obtained. RAC tolerated well.
Visit Information    Have you changed or started any medications since your last visit including any over-the-counter medicines, vitamins, or herbal medicines? no   Are you having any side effects from any of your medications? -  no  Have you stopped taking any of your medications? Is so, why? -  no    Have you seen any other physician or provider since your last visit? No  Have you had any other diagnostic tests since your last visit? No  Have you been seen in the emergency room and/or had an admission to a hospital since we last saw you? No  Have you had your routine dental cleaning in the past 6 months? no    Have you activated your [employer] account? If not, what are your barriers? Yes     Patient Care Team:  Kailey Pepe MD as PCP - General (Family Medicine)  Kailey Pepe MD as PCP - Empaneled Provider  Giovanna Brasher MD (Family Medicine)  Sugey Morgan MD as Consulting Physician (Gastroenterology)  Sugey Morgan MD as Consulting Physician (Gastroenterology)    Medical History Review  Past Medical, Family, and Social History reviewed and does contribute to the patient presenting condition    Health Maintenance   Topic Date Due    Shingles vaccine (1 of 2) Never done    Respiratory Syncytial Virus (RSV) Pregnant or age 60 yrs+ (1 - Risk 60-74 years 1-dose series) Never done    Breast cancer screen  05/18/2024    COVID-19 Vaccine (5 - 2023-24 season) 09/01/2024    Annual Wellness Visit (Medicare Advantage)  01/01/2025    Lipids  10/07/2025    GFR test (Diabetes, CKD 3-4, OR last GFR 15-59)  11/25/2025    Depression Monitoring  01/02/2026    Colorectal Cancer Screen  07/09/2028    DTaP/Tdap/Td vaccine (2 - Td or Tdap) 07/25/2029    Flu vaccine  Completed    Pneumococcal 0-64 years Vaccine  Completed    Hepatitis C screen  Completed    HIV screen  Completed    Hepatitis A vaccine  Aged Out    Hepatitis B vaccine  Aged Out    Hib vaccine  Aged Out    Polio vaccine  Aged Out    Meningococcal (ACWY) vaccine  
Thyroid: No thyromegaly.   Cardiovascular:      Rate and Rhythm: Normal rate and regular rhythm.      Heart sounds: Normal heart sounds. No murmur heard.  Pulmonary:      Effort: Pulmonary effort is normal. No respiratory distress.      Breath sounds: Normal breath sounds. No wheezing.   Abdominal:      General: Bowel sounds are normal.      Palpations: Abdomen is soft.      Tenderness: There is no abdominal tenderness. There is no guarding.      Comments: Ileostomy bag   Musculoskeletal:      Cervical back: Spasms and tenderness present. Decreased range of motion.      Thoracic back: Spasms present.      Lumbar back: Spasms present. No tenderness. Decreased range of motion.      Right lower leg: No edema.      Left lower leg: No edema.   Neurological:      General: No focal deficit present.      Mental Status: She is alert and oriented to person, place, and time.      Cranial Nerves: No cranial nerve deficit or dysarthria.      Sensory: Sensory deficit present.      Motor: Weakness present. No tremor.      Coordination: Romberg sign negative.      Gait: Gait abnormal. Tandem walk normal.   Psychiatric:         Attention and Perception: Attention and perception normal.         Mood and Affect: Mood is anxious. Mood is not depressed.         Speech: She is communicative. Speech is not rapid and pressured or slurred.         Behavior: Behavior is not agitated or slowed.         Thought Content: Thought content normal.                   Allergies   Allergen Reactions    Latex Hives, Itching, Dermatitis and Rash    Buspar [Buspirone] Other (See Comments)     Pt consistently declines this med, stating it makes her ears ring.    Kiwi Extract      Face swelling, some difficulty breathing    Crab Extract Itching     throat    Other      ALLERGIC TO KIWI EXTRACT     Prior to Visit Medications    Medication Sig Taking? Authorizing Provider   cephALEXin (KEFLEX) 500 MG capsule Take 1 capsule by mouth 2 times daily Yes Afshin

## 2025-02-05 ENCOUNTER — HOSPITAL ENCOUNTER (OUTPATIENT)
Dept: CT IMAGING | Age: 65
Discharge: HOME | End: 2025-02-07
Payer: MEDICARE

## 2025-02-05 VITALS
SYSTOLIC BLOOD PRESSURE: 102 MMHG | TEMPERATURE: 96.8 F | DIASTOLIC BLOOD PRESSURE: 59 MMHG | OXYGEN SATURATION: 99 % | HEIGHT: 61 IN | BODY MASS INDEX: 20.58 KG/M2 | HEART RATE: 96 BPM | RESPIRATION RATE: 16 BRPM | WEIGHT: 109 LBS

## 2025-02-05 DIAGNOSIS — Z86.2 HX OF NORMOCYTIC NORMOCHROMIC ANEMIA: ICD-10-CM

## 2025-02-05 LAB
BASOPHILS # BLD: 0 K/UL (ref 0–0.2)
BASOPHILS NFR BLD: 1 % (ref 0–2)
EOSINOPHIL # BLD: 0.1 K/UL (ref 0–0.4)
EOSINOPHILS RELATIVE PERCENT: 1 % (ref 0–4)
ERYTHROCYTE [DISTWIDTH] IN BLOOD BY AUTOMATED COUNT: 14 % (ref 11.5–14.9)
HCT VFR BLD AUTO: 37.6 % (ref 36–46)
HGB BLD-MCNC: 12.3 G/DL (ref 12–16)
INR PPP: 1.1
LYMPHOCYTES NFR BLD: 0.8 K/UL (ref 1–4.8)
LYMPHOCYTES RELATIVE PERCENT: 14 % (ref 24–44)
MCH RBC QN AUTO: 32.8 PG (ref 26–34)
MCHC RBC AUTO-ENTMCNC: 32.7 G/DL (ref 31–37)
MCV RBC AUTO: 100.5 FL (ref 80–100)
MONOCYTES NFR BLD: 0.5 K/UL (ref 0.1–1.3)
MONOCYTES NFR BLD: 8 % (ref 1–7)
NEUTROPHILS NFR BLD: 76 % (ref 36–66)
NEUTS SEG NFR BLD: 4.7 K/UL (ref 1.3–9.1)
PARTIAL THROMBOPLASTIN TIME: 27.5 SEC (ref 24–36)
PLATELET # BLD AUTO: 238 K/UL (ref 150–450)
PMV BLD AUTO: 8.2 FL (ref 6–12)
PROTHROMBIN TIME: 15 SEC (ref 11.8–14.6)
RBC # BLD AUTO: 3.74 M/UL (ref 4–5.2)
WBC OTHER # BLD: 6.1 K/UL (ref 3.5–11)

## 2025-02-05 PROCEDURE — 2709999900 CT BIOPSY BONE MARROW

## 2025-02-05 PROCEDURE — 85025 COMPLETE CBC W/AUTO DIFF WBC: CPT

## 2025-02-05 PROCEDURE — 88237 TISSUE CULTURE BONE MARROW: CPT

## 2025-02-05 PROCEDURE — 88280 CHROMOSOME KARYOTYPE STUDY: CPT

## 2025-02-05 PROCEDURE — 85610 PROTHROMBIN TIME: CPT

## 2025-02-05 PROCEDURE — 88264 CHROMOSOME ANALYSIS 20-25: CPT

## 2025-02-05 PROCEDURE — 36415 COLL VENOUS BLD VENIPUNCTURE: CPT

## 2025-02-05 PROCEDURE — 6360000002 HC RX W HCPCS: Performed by: RADIOLOGY

## 2025-02-05 PROCEDURE — 85730 THROMBOPLASTIN TIME PARTIAL: CPT

## 2025-02-05 RX ORDER — SODIUM CHLORIDE 0.9 % (FLUSH) 0.9 %
5-40 SYRINGE (ML) INJECTION EVERY 12 HOURS SCHEDULED
Status: DISCONTINUED | OUTPATIENT
Start: 2025-02-05 | End: 2025-02-08 | Stop reason: HOSPADM

## 2025-02-05 RX ORDER — FENTANYL CITRATE 50 UG/ML
INJECTION, SOLUTION INTRAMUSCULAR; INTRAVENOUS PRN
Status: COMPLETED | OUTPATIENT
Start: 2025-02-05 | End: 2025-02-05

## 2025-02-05 RX ORDER — SODIUM CHLORIDE 0.9 % (FLUSH) 0.9 %
5-40 SYRINGE (ML) INJECTION PRN
Status: DISCONTINUED | OUTPATIENT
Start: 2025-02-05 | End: 2025-02-08 | Stop reason: HOSPADM

## 2025-02-05 RX ORDER — SODIUM CHLORIDE 9 MG/ML
INJECTION, SOLUTION INTRAVENOUS CONTINUOUS
Status: DISCONTINUED | OUTPATIENT
Start: 2025-02-05 | End: 2025-02-08 | Stop reason: HOSPADM

## 2025-02-05 RX ORDER — SODIUM CHLORIDE 9 MG/ML
INJECTION, SOLUTION INTRAVENOUS PRN
Status: DISCONTINUED | OUTPATIENT
Start: 2025-02-05 | End: 2025-02-08 | Stop reason: HOSPADM

## 2025-02-05 RX ADMIN — FENTANYL CITRATE 25 MCG: 50 INJECTION, SOLUTION INTRAMUSCULAR; INTRAVENOUS at 12:39

## 2025-02-05 ASSESSMENT — ENCOUNTER SYMPTOMS
SORE THROAT: 0
NAUSEA: 0
ABDOMINAL PAIN: 1
COUGH: 1
CONSTIPATION: 0
SHORTNESS OF BREATH: 0
DIARRHEA: 0
VOMITING: 0

## 2025-02-05 ASSESSMENT — PAIN DESCRIPTION - DESCRIPTORS
DESCRIPTORS: ACHING
DESCRIPTORS: ACHING

## 2025-02-05 ASSESSMENT — PAIN - FUNCTIONAL ASSESSMENT
PAIN_FUNCTIONAL_ASSESSMENT: 0-10
PAIN_FUNCTIONAL_ASSESSMENT: 0-10

## 2025-02-05 NOTE — BRIEF OP NOTE
Brief Postoperative Note for Bone Marrow Biopsy    Rajani Iglesias  YOB: 1960  [id number]    Pre-operative Diagnosis: h/o normocytic anemia/macrocytosis     Post-operative Diagnosis: Same     Procedure: Right posterior iliac bone marrow aspiration with and without heparin & biopsy     Anesthesia: 1% lidocaine and fentanyl     Surgeons/Assistants: Dr. Warner MD; Dara Velasquez PA-C     Estimated Blood Loss: Minimal       Complications: None     Specimens Were Obtained: from the Right posterior iliac spine using an Arrow Oncontrol Device 11 g x 4 in. 6 cc was collected with heparin and 12 cc was collected without heparin. A bone biopsy was also obtained. Specimens were received by hem/onc at the time of collection. Spicules verified by hemo/onc. Dressing applied. Vital signs were reviewed and were stable after the procedure.     Electronically signed by Dara Velasquez PA-C on 2/5/2025 at 1:06 PM

## 2025-02-05 NOTE — H&P
HISTORY and PHYSICAL  [facility]       NAME:  Rajani Iglesias  MRN: [id number]   YOB: 1960   Date: 2/5/2025   Age: 64 y.o.  Gender: female       COMPLAINT AND PRESENT HISTORY:     Rajani Iglesias is 64 y.o.  female, here for Bone Marrow Biopsy. Hx of normocytic anemia and high calcium. Pt here today with Ngoc, granddaughter.     Below italics a portion of office visit note by Dr. Alba dated 01/31/25: Reviewed   HISTORY OF PRESENT ILLNESS:    Oncologic History:    Rajani Iglesias is a very pleasant 64 y.o. female.  Was referred for anemia and macrocytosis, CBC done on 11/25/2024 and did show WBC 8.5 hemoglobin 11.7  platelet count at 345  Calcium was elevated at 11.5 however previous calcium was 7.7  Creatinine elevated at 1.1 with BUN of 25  Myeloid malignancy mutation panel NGS was done as below        Myeloid Malignancies Mutation Panel NGS     Submitted diagnosis or diagnosis under consideration for variant   interpretation: Not provided     TIER 1: Variants of Known Clinical Significance in Hematologic   Malignancies     1. ASXL1 c.1934dup, p.[id number] (NM_[id number])   VAF: 5.5%     2. TET2 c.5618T>C, p.[id number] (NM_[id number])   VAF: 7.0%   Malignancies     1. CBLB c.815G>A, p.Dkt466Sdu (NM_[id number])   VAF: 45.9%   This variant has been rarely reported in hematologic malignancies   (1), to the best of our knowledge.      Interval history  Myeloma workup was done and no M spike but there is elevated beta globulin  Still have a high IgA and IgG  Hemoglobin improved to 12.3  EPO level at 8 and normal iron panel      UPDATE   Hx of breast cancer. Family history of leukemia. Pt reports persistent unintended weight loss. Pt reports ileostomy placed in August, 2024. Pt reports after that she had been losing weight. Reports ileostomy is functioning well.  Reports good appetite. Reports she has finally been gaining weight. Denies increased weakness or fatigue. Denies easily

## 2025-02-05 NOTE — DISCHARGE INSTRUCTIONS
RADIOLOGY POST BIOPSY INSTRUCTIONS    If you had IV Sedation:  No alcoholic beverages, no driving, operating machinery, or making legal or important decisions for 24 hours.    Diet:    May resume your usual diet.    Medications:  Use over the counter pain medications as directed on the bottle for pain control.  You may continue your home medications as instructed by the radiologist.    Post-Procedure Activity:    Avoid exercises that use your belly muscles and strenuous activities, such as bicycle riding, jogging, weight lifting, or aerobic exercise, for 1 week or until your doctor says it is okay.  Avoid other heavy work or sports for 2 days.    Limit activities for 24 hours.  Resume normal diet.  You will probably be able to shower the same day as the test, if your doctor says it is okay. Pat the puncture site dry. Do not take a bath for at least 2 days after the test, or until your doctor tells you it is okay.    Puncture Site:  Keep clean and dry for 24-48 hours.    Call Doctor if  Swelling occurs around puncture site.  Bleeding occurs at puncture site.  Shortness of breath occurs.  Extreme pain occurs.      IF YOU CANNOT REACH THE DOCTOR, GO TO THE NEAREST EMERGENCY FACILITY.     Phone:  Interventional Radiology  [phone]  After hours Radiology  [phone]  Dr Hylton

## 2025-02-05 NOTE — PROGRESS NOTES
Patient tolerated bone marrow biopsy without distress. Dressing to site. Patient returned to room. Nurse updated.

## 2025-02-10 LAB — BONE MARROW REPORT: NORMAL

## 2025-02-11 LAB
CHROMOSOME STUDY: NORMAL
FLOW CYTOMETRY, BM: NORMAL

## 2025-02-12 ENCOUNTER — TELEPHONE (OUTPATIENT)
Dept: UROLOGY | Age: 65
End: 2025-02-12

## 2025-02-12 ENCOUNTER — OUTSIDE SERVICES (OUTPATIENT)
Dept: FAMILY MEDICINE CLINIC | Age: 65
End: 2025-02-12
Payer: MEDICARE

## 2025-02-12 ENCOUNTER — HOSPITAL ENCOUNTER (OUTPATIENT)
Dept: WOMENS IMAGING | Age: 65
Discharge: HOME | End: 2025-02-14
Payer: MEDICARE

## 2025-02-12 DIAGNOSIS — G99.2 STENOSIS OF CERVICAL SPINE WITH MYELOPATHY (HCC): Primary | ICD-10-CM

## 2025-02-12 DIAGNOSIS — Z12.31 ENCOUNTER FOR SCREENING MAMMOGRAM FOR HIGH-RISK PATIENT: ICD-10-CM

## 2025-02-12 DIAGNOSIS — Z93.2 ILEOSTOMY IN PLACE (HCC): ICD-10-CM

## 2025-02-12 DIAGNOSIS — E44.0 MODERATE PROTEIN-CALORIE MALNUTRITION (HCC): ICD-10-CM

## 2025-02-12 DIAGNOSIS — M48.02 STENOSIS OF CERVICAL SPINE WITH MYELOPATHY (HCC): Primary | ICD-10-CM

## 2025-02-12 PROCEDURE — G0180 MD CERTIFICATION HHA PATIENT: HCPCS | Performed by: FAMILY MEDICINE

## 2025-02-12 PROCEDURE — 77063 BREAST TOMOSYNTHESIS BI: CPT

## 2025-02-12 NOTE — TELEPHONE ENCOUNTER
Writer called patient and left a message to remind patient of appointment for tomorrow 2/13. Writer informed patient on voicemal if due to weather we get to a Level 3 on 2/13 the office will be closed and we will call patient back to get rescheduled.

## 2025-02-12 NOTE — PROGRESS NOTES
This patient is currently under my care and considered medically homebound, requiring skilled Home Health services. I have reviewed the patient's status and plan of care.     Has the patient been seen within the last 90 days?  yes, 02/03/2025    Time spent completing forms?  10

## 2025-02-13 ENCOUNTER — OFFICE VISIT (OUTPATIENT)
Dept: UROLOGY | Age: 65
End: 2025-02-13
Payer: MEDICARE

## 2025-02-13 ENCOUNTER — TELEPHONE (OUTPATIENT)
Dept: UROLOGY | Age: 65
End: 2025-02-13

## 2025-02-13 ENCOUNTER — PREP FOR PROCEDURE (OUTPATIENT)
Dept: UROLOGY | Age: 65
End: 2025-02-13

## 2025-02-13 ENCOUNTER — HOSPITAL ENCOUNTER (OUTPATIENT)
Age: 65
Setting detail: SPECIMEN
Discharge: HOME | End: 2025-02-13

## 2025-02-13 VITALS
BODY MASS INDEX: 21.16 KG/M2 | TEMPERATURE: 97.4 F | SYSTOLIC BLOOD PRESSURE: 108 MMHG | DIASTOLIC BLOOD PRESSURE: 70 MMHG | HEART RATE: 107 BPM | WEIGHT: 112 LBS

## 2025-02-13 DIAGNOSIS — R31.29 OTHER MICROSCOPIC HEMATURIA: ICD-10-CM

## 2025-02-13 DIAGNOSIS — N39.0 RECURRENT UTI: Primary | ICD-10-CM

## 2025-02-13 DIAGNOSIS — N39.0 RECURRENT UTI: ICD-10-CM

## 2025-02-13 PROCEDURE — 3017F COLORECTAL CA SCREEN DOC REV: CPT | Performed by: UROLOGY

## 2025-02-13 PROCEDURE — 1036F TOBACCO NON-USER: CPT | Performed by: UROLOGY

## 2025-02-13 PROCEDURE — G8427 DOCREV CUR MEDS BY ELIG CLIN: HCPCS | Performed by: UROLOGY

## 2025-02-13 PROCEDURE — 99204 OFFICE O/P NEW MOD 45 MIN: CPT | Performed by: UROLOGY

## 2025-02-13 PROCEDURE — G8420 CALC BMI NORM PARAMETERS: HCPCS | Performed by: UROLOGY

## 2025-02-13 ASSESSMENT — ENCOUNTER SYMPTOMS
CHOKING: 0
COUGH: 0
SHORTNESS OF BREATH: 0
BACK PAIN: 0
WHEEZING: 0
ABDOMINAL PAIN: 0

## 2025-02-13 NOTE — PROGRESS NOTES
Review of Systems   Constitutional:  Negative for fatigue.   Respiratory:  Negative for cough, choking, shortness of breath and wheezing.    Cardiovascular:  Negative for chest pain, palpitations and leg swelling.   Gastrointestinal:  Negative for abdominal pain.   Genitourinary:  Positive for difficulty urinating, frequency, pelvic pain and urgency. Negative for decreased urine volume, dysuria, flank pain, genital sores, hematuria, vaginal bleeding, vaginal discharge and vaginal pain.   Musculoskeletal:  Negative for back pain.

## 2025-02-13 NOTE — TELEPHONE ENCOUNTER
Cysto, (Bilat) Retrograde Pyelogram @ 03/07/25 9:00am     PAT: -Phone Call     Spoke with patient in office, procedure information given to patient.

## 2025-02-13 NOTE — PROGRESS NOTES
[facility] PHYSICIANS [facility]  [address]  [facility] [address]  Dept: [phone]    [facility] Urology Office Note - New Patient    Patient:  Rajani Iglesias  YOB: 1960  Date: 2/13/2025    The patient is a 64 y.o. female who presentstoday for evaluation of the following problems:   Chief Complaint   Patient presents with    Hematuria     New patient for difficulty urinating and hematuria.  Pt states many UTIs in the past year.  She also has some urgency and sometimes can not pee.     referred by Kailey Pepe MD.    HPI  Here for recurrent uti. Gets about 3-4 per year. Mod stream, mild dysuria, has h/o hematuria.  Occ incontinence.   No stones    (Patient's old records have been requested, reviewed and summarized in today's note.)    Summary of old records: N/A    History: N/A    ProceduresToday: N/A    Urinalysis today:  No results found for this visit on 02/13/25.    AUA Symptom Score (2/13/2025):                               Last BUN andcreatinine:  Lab Results   Component Value Date    BUN 25 (H) 11/25/2024     Lab Results   Component Value Date    CREATININE 1.1 (H) 11/25/2024       Additional Lab/Culture results: none    Reviewed during this Office Visit: none  (results were independently reviewed byphysician and radiology report verified)    PAST MEDICAL, FAMILY AND SOCIAL HISTORY:  Past Medical History:   Diagnosis Date    Anxiety     Arthritis     At maximum risk for fall 12/29/2021    caudal equina syndrome and cervical stenosis    Breast cancer (HCC)     Brief psychotic disorder (HCC) 01/05/2024    Cancer (HCC)     right breast    Cauda equina syndrome (HCC) 12/29/2021    neuropathy, mobility difficulty, incontinance    Cerebrovascular accident (HCC) 01/01/2024    Cervical stenosis of spine 12/29/2021    GERD (gastroesophageal reflux disease)     History of stomach ulcers     History of therapeutic radiation     Hx

## 2025-02-16 LAB
MICROORGANISM SPEC CULT: ABNORMAL
SERVICE CMNT-IMP: ABNORMAL
SPECIMEN DESCRIPTION: ABNORMAL

## 2025-02-18 RX ORDER — CEPHALEXIN 500 MG/1
500 CAPSULE ORAL 3 TIMES DAILY
Qty: 21 CAPSULE | Refills: 0 | Status: SHIPPED | OUTPATIENT
Start: 2025-02-18 | End: 2025-02-25

## 2025-02-28 ENCOUNTER — HOSPITAL ENCOUNTER (OUTPATIENT)
Dept: PREADMISSION TESTING | Age: 65
Discharge: HOME | End: 2025-03-04

## 2025-02-28 VITALS — WEIGHT: 115 LBS | HEIGHT: 58 IN | BODY MASS INDEX: 24.14 KG/M2

## 2025-02-28 RX ORDER — FERROUS SULFATE 325(65) MG
325 TABLET ORAL DAILY
COMMUNITY

## 2025-02-28 NOTE — PROGRESS NOTES
Outreach attempt was made to schedule a Medicare Wellness Visit. This was the first attempt. Contact was made, MWV appointment refused.    Per patient declined to schedule MWV+ appt    Last OV 10/16/2023 and last refill 11/24/2024   practitioner or house officer.  Your IV will be started and you will meet your anesthesiologist.    When you go to surgery, your family will be directed to the surgical waiting room, where the doctor should speak with them after your surgery.    After surgery, you will be taken to the recovery area.  When you are alert and stable, you will receive instructions and be prepared for discharge.     INSTRUCTIONS REVIEWED WITH CHET. VERBALIZED UNDERSTANDING.   CYSTO, BILAT PYELOGRAM WITH KISHORE 3/7/25 @ 0498.

## 2025-03-05 DIAGNOSIS — I50.32 CHRONIC DIASTOLIC HEART FAILURE (HCC): ICD-10-CM

## 2025-03-05 RX ORDER — DILTIAZEM HYDROCHLORIDE 240 MG/1
240 CAPSULE, COATED, EXTENDED RELEASE ORAL DAILY
Qty: 30 CAPSULE | Refills: 3 | Status: SHIPPED | OUTPATIENT
Start: 2025-03-05 | End: 2025-07-03

## 2025-03-06 ENCOUNTER — ANESTHESIA EVENT (OUTPATIENT)
Dept: OPERATING ROOM | Age: 65
End: 2025-03-06
Payer: MEDICARE

## 2025-03-06 ENCOUNTER — OFFICE VISIT (OUTPATIENT)
Age: 65
End: 2025-03-06

## 2025-03-06 VITALS
TEMPERATURE: 98.6 F | HEIGHT: 58 IN | HEART RATE: 90 BPM | DIASTOLIC BLOOD PRESSURE: 72 MMHG | OXYGEN SATURATION: 98 % | WEIGHT: 116 LBS | BODY MASS INDEX: 24.35 KG/M2 | SYSTOLIC BLOOD PRESSURE: 121 MMHG

## 2025-03-06 DIAGNOSIS — Z90.49 HISTORY OF PARTIAL COLECTOMY: ICD-10-CM

## 2025-03-06 DIAGNOSIS — K81.1 CHRONIC CHOLECYSTITIS: ICD-10-CM

## 2025-03-06 DIAGNOSIS — Z93.2 ILEOSTOMY IN PLACE (HCC): Primary | ICD-10-CM

## 2025-03-06 DIAGNOSIS — Z86.79 HISTORY OF CORONARY ARTERY DISEASE: ICD-10-CM

## 2025-03-06 NOTE — PRE-PROCEDURE INSTRUCTIONS
Nothing to eat after midnight. Y  Are you taking any blood thinners? When was the last day?N  Make sure to use Hibiclens prior to surgery.  Remove any jewelry and body piercings.Y  Do you wear glasses? If so, please bring a case to store them in.  Are you having any Covid symptoms?N  Do you have any new rashes, infections, etc. that we should be aware of?N  Do you have a ride home the day of surgery? It cannot be a cab or medical transportation.Y  Verify surgery time and what time to arrive at hospital.0630

## 2025-03-06 NOTE — PATIENT INSTRUCTIONS
Complete barium enema X-ray- schedule this.  Will need cardiac clearance to prepare for surgery.  Return to office after X-ray to discuss surgery.

## 2025-03-07 ENCOUNTER — HOSPITAL ENCOUNTER (OUTPATIENT)
Age: 65
Setting detail: OUTPATIENT SURGERY
Discharge: HOME | End: 2025-03-07
Attending: UROLOGY | Admitting: UROLOGY
Payer: MEDICARE

## 2025-03-07 ENCOUNTER — ANESTHESIA (OUTPATIENT)
Dept: OPERATING ROOM | Age: 65
End: 2025-03-07
Payer: MEDICARE

## 2025-03-07 ENCOUNTER — APPOINTMENT (OUTPATIENT)
Dept: GENERAL RADIOLOGY | Age: 65
End: 2025-03-07
Attending: UROLOGY
Payer: MEDICARE

## 2025-03-07 VITALS
HEIGHT: 58 IN | TEMPERATURE: 97 F | DIASTOLIC BLOOD PRESSURE: 57 MMHG | WEIGHT: 115 LBS | RESPIRATION RATE: 18 BRPM | HEART RATE: 65 BPM | BODY MASS INDEX: 24.14 KG/M2 | SYSTOLIC BLOOD PRESSURE: 119 MMHG | OXYGEN SATURATION: 98 %

## 2025-03-07 DIAGNOSIS — N39.0 RECURRENT UTI: ICD-10-CM

## 2025-03-07 DIAGNOSIS — R31.29 OTHER MICROSCOPIC HEMATURIA: ICD-10-CM

## 2025-03-07 LAB
CASE NUMBER:: NORMAL
SPECIMEN DESCRIPTION: NORMAL
SURGICAL PATHOLOGY REPORT: NORMAL

## 2025-03-07 PROCEDURE — 2580000003 HC RX 258: Performed by: ANESTHESIOLOGY

## 2025-03-07 PROCEDURE — C1758 CATHETER, URETERAL: HCPCS | Performed by: UROLOGY

## 2025-03-07 PROCEDURE — 6360000002 HC RX W HCPCS: Performed by: ANESTHESIOLOGY

## 2025-03-07 PROCEDURE — 7100000030 HC ASPR PHASE II RECOVERY - FIRST 15 MIN: Performed by: UROLOGY

## 2025-03-07 PROCEDURE — 7100000031 HC ASPR PHASE II RECOVERY - ADDTL 15 MIN: Performed by: UROLOGY

## 2025-03-07 PROCEDURE — 3600000012 HC SURGERY LEVEL 2 ADDTL 15MIN: Performed by: UROLOGY

## 2025-03-07 PROCEDURE — 3700000000 HC ANESTHESIA ATTENDED CARE: Performed by: UROLOGY

## 2025-03-07 PROCEDURE — 6360000004 HC RX CONTRAST MEDICATION: Performed by: UROLOGY

## 2025-03-07 PROCEDURE — 88112 CYTOPATH CELL ENHANCE TECH: CPT

## 2025-03-07 PROCEDURE — 6360000002 HC RX W HCPCS: Performed by: NURSE ANESTHETIST, CERTIFIED REGISTERED

## 2025-03-07 PROCEDURE — 3600000002 HC SURGERY LEVEL 2 BASE: Performed by: UROLOGY

## 2025-03-07 PROCEDURE — 2709999900 HC NON-CHARGEABLE SUPPLY: Performed by: UROLOGY

## 2025-03-07 PROCEDURE — 7100000000 HC PACU RECOVERY - FIRST 15 MIN: Performed by: UROLOGY

## 2025-03-07 PROCEDURE — 7100000011 HC PHASE II RECOVERY - ADDTL 15 MIN: Performed by: UROLOGY

## 2025-03-07 PROCEDURE — 3700000001 HC ADD 15 MINUTES (ANESTHESIA): Performed by: UROLOGY

## 2025-03-07 PROCEDURE — 7100000001 HC PACU RECOVERY - ADDTL 15 MIN: Performed by: UROLOGY

## 2025-03-07 PROCEDURE — 7100000010 HC PHASE II RECOVERY - FIRST 15 MIN: Performed by: UROLOGY

## 2025-03-07 RX ORDER — PROPOFOL 10 MG/ML
INJECTION, EMULSION INTRAVENOUS
Status: DISCONTINUED | OUTPATIENT
Start: 2025-03-07 | End: 2025-03-07 | Stop reason: SDUPTHER

## 2025-03-07 RX ORDER — SODIUM CHLORIDE 0.9 % (FLUSH) 0.9 %
5-40 SYRINGE (ML) INJECTION PRN
Status: DISCONTINUED | OUTPATIENT
Start: 2025-03-07 | End: 2025-03-07 | Stop reason: HOSPADM

## 2025-03-07 RX ORDER — ONDANSETRON 2 MG/ML
INJECTION INTRAMUSCULAR; INTRAVENOUS
Status: DISCONTINUED | OUTPATIENT
Start: 2025-03-07 | End: 2025-03-07 | Stop reason: SDUPTHER

## 2025-03-07 RX ORDER — DIPHENHYDRAMINE HYDROCHLORIDE 50 MG/ML
12.5 INJECTION INTRAMUSCULAR; INTRAVENOUS
Status: DISCONTINUED | OUTPATIENT
Start: 2025-03-07 | End: 2025-03-07 | Stop reason: HOSPADM

## 2025-03-07 RX ORDER — SODIUM CHLORIDE 0.9 % (FLUSH) 0.9 %
5-40 SYRINGE (ML) INJECTION EVERY 12 HOURS SCHEDULED
Status: DISCONTINUED | OUTPATIENT
Start: 2025-03-07 | End: 2025-03-07 | Stop reason: HOSPADM

## 2025-03-07 RX ORDER — LIDOCAINE HYDROCHLORIDE 10 MG/ML
1 INJECTION, SOLUTION EPIDURAL; INFILTRATION; INTRACAUDAL; PERINEURAL
Status: COMPLETED | OUTPATIENT
Start: 2025-03-07 | End: 2025-03-07

## 2025-03-07 RX ORDER — SODIUM CHLORIDE 9 MG/ML
INJECTION, SOLUTION INTRAVENOUS PRN
Status: DISCONTINUED | OUTPATIENT
Start: 2025-03-07 | End: 2025-03-07 | Stop reason: HOSPADM

## 2025-03-07 RX ORDER — CEFAZOLIN SODIUM 1 G/3ML
INJECTION, POWDER, FOR SOLUTION INTRAMUSCULAR; INTRAVENOUS
Status: DISCONTINUED | OUTPATIENT
Start: 2025-03-07 | End: 2025-03-07 | Stop reason: SDUPTHER

## 2025-03-07 RX ORDER — SODIUM CHLORIDE, SODIUM LACTATE, POTASSIUM CHLORIDE, CALCIUM CHLORIDE 600; 310; 30; 20 MG/100ML; MG/100ML; MG/100ML; MG/100ML
INJECTION, SOLUTION INTRAVENOUS CONTINUOUS
Status: DISCONTINUED | OUTPATIENT
Start: 2025-03-07 | End: 2025-03-07 | Stop reason: HOSPADM

## 2025-03-07 RX ORDER — LIDOCAINE HYDROCHLORIDE 20 MG/ML
INJECTION, SOLUTION EPIDURAL; INFILTRATION; INTRACAUDAL; PERINEURAL
Status: DISCONTINUED | OUTPATIENT
Start: 2025-03-07 | End: 2025-03-07 | Stop reason: SDUPTHER

## 2025-03-07 RX ORDER — FENTANYL CITRATE 0.05 MG/ML
25 INJECTION, SOLUTION INTRAMUSCULAR; INTRAVENOUS EVERY 5 MIN PRN
Status: DISCONTINUED | OUTPATIENT
Start: 2025-03-07 | End: 2025-03-07 | Stop reason: HOSPADM

## 2025-03-07 RX ORDER — ONDANSETRON 2 MG/ML
4 INJECTION INTRAMUSCULAR; INTRAVENOUS
Status: DISCONTINUED | OUTPATIENT
Start: 2025-03-07 | End: 2025-03-07 | Stop reason: HOSPADM

## 2025-03-07 RX ORDER — IOPAMIDOL 510 MG/ML
INJECTION, SOLUTION INTRAVASCULAR PRN
Status: DISCONTINUED | OUTPATIENT
Start: 2025-03-07 | End: 2025-03-07 | Stop reason: ALTCHOICE

## 2025-03-07 RX ORDER — METOCLOPRAMIDE HYDROCHLORIDE 5 MG/ML
10 INJECTION INTRAMUSCULAR; INTRAVENOUS
Status: DISCONTINUED | OUTPATIENT
Start: 2025-03-07 | End: 2025-03-07 | Stop reason: HOSPADM

## 2025-03-07 RX ORDER — NALOXONE HYDROCHLORIDE 0.4 MG/ML
INJECTION, SOLUTION INTRAMUSCULAR; INTRAVENOUS; SUBCUTANEOUS PRN
Status: DISCONTINUED | OUTPATIENT
Start: 2025-03-07 | End: 2025-03-07 | Stop reason: HOSPADM

## 2025-03-07 RX ORDER — MIDAZOLAM HYDROCHLORIDE 1 MG/ML
INJECTION, SOLUTION INTRAMUSCULAR; INTRAVENOUS
Status: DISCONTINUED | OUTPATIENT
Start: 2025-03-07 | End: 2025-03-07 | Stop reason: SDUPTHER

## 2025-03-07 RX ORDER — FENTANYL CITRATE 50 UG/ML
INJECTION, SOLUTION INTRAMUSCULAR; INTRAVENOUS
Status: DISCONTINUED | OUTPATIENT
Start: 2025-03-07 | End: 2025-03-07 | Stop reason: SDUPTHER

## 2025-03-07 RX ADMIN — SODIUM CHLORIDE, POTASSIUM CHLORIDE, SODIUM LACTATE AND CALCIUM CHLORIDE: 600; 310; 30; 20 INJECTION, SOLUTION INTRAVENOUS at 07:47

## 2025-03-07 RX ADMIN — MIDAZOLAM 1 MG: 1 INJECTION INTRAMUSCULAR; INTRAVENOUS at 08:16

## 2025-03-07 RX ADMIN — LIDOCAINE HYDROCHLORIDE 1 ML: 10 INJECTION, SOLUTION EPIDURAL; INFILTRATION; INTRACAUDAL; PERINEURAL at 07:47

## 2025-03-07 RX ADMIN — ONDANSETRON 4 MG: 2 INJECTION, SOLUTION INTRAMUSCULAR; INTRAVENOUS at 08:23

## 2025-03-07 RX ADMIN — PROPOFOL 60 MG: 10 INJECTION, EMULSION INTRAVENOUS at 08:24

## 2025-03-07 RX ADMIN — LIDOCAINE HYDROCHLORIDE 60 MG: 20 INJECTION, SOLUTION EPIDURAL; INFILTRATION; INTRACAUDAL; PERINEURAL at 08:20

## 2025-03-07 RX ADMIN — CEFAZOLIN 2 G: 1 INJECTION, POWDER, FOR SOLUTION INTRAMUSCULAR; INTRAVENOUS at 08:25

## 2025-03-07 RX ADMIN — FENTANYL CITRATE 50 MCG: 50 INJECTION INTRAMUSCULAR; INTRAVENOUS at 08:16

## 2025-03-07 ASSESSMENT — PAIN - FUNCTIONAL ASSESSMENT: PAIN_FUNCTIONAL_ASSESSMENT: 0-10

## 2025-03-07 ASSESSMENT — ENCOUNTER SYMPTOMS
RESPIRATORY NEGATIVE: 1
BACK PAIN: 1
GASTROINTESTINAL NEGATIVE: 1

## 2025-03-07 NOTE — BRIEF OP NOTE
Brief Postoperative Note      Patient: Rajani Iglesias  YOB: 1960  MRN: [id number]    Date of Procedure: 3/7/2025    Pre-Op Diagnosis Codes:      * Recurrent UTI [N39.0]     * Other microscopic hematuria [R31.29]    Post-Op Diagnosis: Same       Procedure(s):  CYSTOSCOPY RETROGRADE PYELOGRAM    Surgeon(s):  Jony Moss MD    Assistant:  * No surgical staff found *    Anesthesia: Monitor Anesthesia Care    Estimated Blood Loss (mL): Minimal    Complications: None    Specimens:   * No specimens in log *    Implants:  * No implants in log *      Drains:   Colostomy RLQ (Active)       Findings:  Infection Present At Time Of Surgery (PATOS) (choose all levels that have infection present):  No infection present  Other Findings: d    Electronically signed by Jony Moss MD on 3/7/2025 at 7:57 AM

## 2025-03-07 NOTE — ANESTHESIA PRE PROCEDURE
Department of Anesthesiology  Preprocedure Note       Name:  Rajani Iglesias   Age:  64 y.o.  :  1960                                          MRN:  [id number]         Date:  3/7/2025      Surgeon: Surgeon(s):  Jony Moss MD    Procedure: Procedure(s):  CYSTOSCOPY RETROGRADE PYELOGRAM    Medications prior to admission:   Prior to Admission medications    Medication Sig Start Date End Date Taking? Authorizing Provider   dilTIAZem (CARDIZEM CD) 240 MG extended release capsule TAKE 1 CAPSULE BY MOUTH DAILY 3/5/25 7/3/25  Kailey Pepe MD   ferrous sulfate (IRON 325) 325 (65 Fe) MG tablet Take 1 tablet by mouth daily    Robert Muro MD   hydrOXYzine HCl (ATARAX) 10 MG tablet TAKE 1 TABLET BY MOUTH 2 TIMES A DAY AS NEEDED FOR ITCHING 25   Kailey Pepe MD   metoprolol tartrate (LOPRESSOR) 25 MG tablet TAKE A HALF TABLET BY MOUTH TWICE A DAY 25   Kailey Pepe MD   QUEtiapine (SEROQUEL) 25 MG tablet Take 1 tablet by mouth nightly 24   Kailey Pepe MD   pantoprazole (PROTONIX) 40 MG tablet Take 1 tablet by mouth daily 24   Kailey Pepe MD   tamsulosin (FLOMAX) 0.4 MG capsule Take 1 capsule by mouth daily 24   Kailey Pepe MD   rOPINIRole (REQUIP) 1 MG tablet Take 1 tablet by mouth nightly 24   Kailey Pepe MD   levothyroxine (SYNTHROID) 100 MCG tablet Take 1 tablet by mouth daily 24   Kailey Pepe MD   folic acid (FOLVITE) 1 MG tablet Take 1 tablet by mouth daily 24   Kailey Pepe MD   atorvastatin (LIPITOR) 40 MG tablet Take 1 tablet by mouth daily 24   Kailey Pepe MD   vitamin D3 (CHOLECALCIFEROL) 25 MCG (1000 UT) TABS tablet Take 2 tablets by mouth daily 24   Kailey Pepe MD   NONFORMULARY Take by mouth 3 times daily THC gummies    Robert Muro MD   zoledronic acid (RECLAST) 5 MG/100ML SOLN Infuse 100 mLs intravenously once for 1 dose  Patient not taking: Reported on 2/3/2025 2/16/24 6/20/25  Giovanna Brasher

## 2025-03-07 NOTE — H&P
HISTORY and PHYSICAL  [facility]       NAME:  Rajani Iglesias  MRN: [id number]   YOB: 1960   Date: 3/7/2025   Age: 64 y.o.  Gender: female       COMPLAINT AND PRESENT HISTORY:     Rajani Iglesias is 64 y.o.,  female, presents for CYSTOSCOPY RETROGRADE PYELOGRAM     Primary dx: Recurrent UTI [N39.0]  Other microscopic hematuria [R31.29].    HPI:  Rajani Iglesias is 64 y.o.,   female, has hx of recurrent UTI for about 3-4 per year. Pt has caudal equina Syndrome and cervical stenosis, with neuropathy, mobility difficulty,  and incontinence.  Pt C/O of , dribbling, frequency and a sense of incomplete evacuation of the bladder. Pt denies  nausea,  vomiting or diaphoresis.  No dysuria. No discoloration of the urine, no blood in the urine , no fever or chills, chest pain or SOB     Review of additional significant medical hx:  (See chart for additional detail, including current medications /see ROS for current S/S): HTN, hypothyroidism    BP Readings from Last 3 Encounters:   03/07/25 125/66   03/06/25 121/72   02/13/25 108/70     NPO status: pt Npo since the past midnight   Medications taken TODAY (with sip of water): pt took her am medication today with sip of water   Anticoagulation status: none    Denies personal hx of blood clots.  Denies personal hx of MRSA infection.  Denies any personal or family hx of previous complications w/anesthesia.    PAST MEDICAL HISTORY     Past Medical History:   Diagnosis Date    Anxiety     Arthritis     At maximum risk for fall 12/29/2021    caudal equina syndrome and cervical stenosis    Breast cancer (HCC)     Brief psychotic disorder (HCC) 01/05/2024    Cancer (HCC)     right breast    Cauda equina syndrome (HCC) 12/29/2021    neuropathy, mobility difficulty, incontinance    Cerebrovascular accident (HCC) 01/01/2024    Cervical stenosis of spine 12/29/2021    GERD (gastroesophageal reflux disease)     History of stomach ulcers     History of therapeutic

## 2025-03-07 NOTE — ANESTHESIA POSTPROCEDURE EVALUATION
Department of Anesthesiology  Postprocedure Note    Patient: Rajani Iglesias  MRN: [id number]  YOB: 1960  Date of evaluation: 3/7/2025    Procedure Summary       Date: 03/07/25 Room / Location: [place] [address] / [facility]    Anesthesia Start: 0812 Anesthesia Stop: 0838    Procedure: CYSTOSCOPY RETROGRADE PYELOGRAM (Bilateral: Ureter) Diagnosis:       Recurrent UTI      Other microscopic hematuria      (Recurrent UTI [N39.0])      (Other microscopic hematuria [R31.29])    Surgeons: Jony Moss MD Responsible Provider: Randal Puente MD    Anesthesia Type: MAC ASA Status: 3            Anesthesia Type: No value filed.    Nadeem Phase I: Nadeem Score: 9    Nadeem Phase II: Nadeem Score: 10    Anesthesia Post Evaluation    Patient location during evaluation: PACU  Patient participation: complete - patient participated  Level of consciousness: awake and alert  Airway patency: patent  Nausea & Vomiting: no vomiting  Cardiovascular status: hemodynamically stable  Respiratory status: acceptable  Hydration status: euvolemic  Comments: POST- ANESTHESIA EVALUATION       Pt Name: Rajani Iglesias  MRN: [id number]  YOB: 1960  Date of evaluation: 3/7/2025  Time:  10:27 AM      BP (!) 119/57   Pulse 65   Temp 97 °F (36.1 °C) (Temporal)   Resp 18   Ht 1.473 m (4' 10\")   Wt 52.2 kg (115 lb)   SpO2 98%   BMI 24.04 kg/m²      Consciousness Level  Awake  Cardiopulmonary Status  Stable  Pain Adequately Treated YES  Nausea / Vomiting  NO  Adequate Hydration  YES  Anesthesia Related Complications NONE      Electronically signed by Randal Puente MD on 3/7/2025 at 10:27 AM         Pain management: satisfactory to patient    No notable events documented.

## 2025-03-10 DIAGNOSIS — K21.9 GASTROESOPHAGEAL REFLUX DISEASE, UNSPECIFIED WHETHER ESOPHAGITIS PRESENT: ICD-10-CM

## 2025-03-10 RX ORDER — PANTOPRAZOLE SODIUM 40 MG/1
40 TABLET, DELAYED RELEASE ORAL DAILY
Qty: 30 TABLET | Refills: 2 | Status: SHIPPED | OUTPATIENT
Start: 2025-03-10

## 2025-03-10 NOTE — TELEPHONE ENCOUNTER
Please Approve or Refuse.  Send to Pharmacy per Pt's Request:      Next Visit Date:  5/7/2025   Last Visit Date: 2/3/2025    Hemoglobin A1C (%)   Date Value   12/18/2023 3.9 (L)             ( goal A1C is < 7)   BP Readings from Last 3 Encounters:   03/07/25 (!) 119/57   03/06/25 121/72   02/13/25 108/70          (goal 120/80)  BUN   Date Value Ref Range Status   11/25/2024 25 (H) 8 - 23 mg/dL Final     Creatinine   Date Value Ref Range Status   11/25/2024 1.1 (H) 0.6 - 0.9 mg/dL Final     Potassium   Date Value Ref Range Status   11/25/2024 4.3 3.7 - 5.3 mmol/L Final

## 2025-03-10 NOTE — OP NOTE
[place] [address]                            OPERATIVE REPORT      PATIENT NAME: CHET GASPAR               : 1960  MED REC NO: [id number]                          ROOM: [facility]  ACCOUNT NO: [id number]                       ADMIT DATE: 2025  PROVIDER: Jony Novak MD      DATE OF PROCEDURE:  2025    SURGEON:  Jony Novak MD    ASSISTANT:  None.    PREOPERATIVE DIAGNOSIS:  Hematuria.    POSTOPERATIVE DIAGNOSIS:  Hematuria.    PROCEDURES:  Cystoscopy, bilateral retrograde pyelogram.    ANESTHESIA:  MAC.    COMPLICATIONS:  None.    ESTIMATED BLOOD LOSS:  Minimal.    NARRATIVE OF PROCEDURE:  This is a 64-year-old white female who has a history of hematuria.  Given this, the above procedure was scheduled.  All the risks and benefits were explained to the patient.  Informed consent was obtained.    This 64-year-old white female was brought back to the operating room, positioned in the modified dorsal lithotomy position after MAC anesthesia was started.  She was sterilely prepped and draped in standard fashion.  A 22-Spanish rigid cystoscope inserted into urethra and advanced into her bladder.  Bladder was examined.  No tumors or stones were noted.  Both ureteral orifices were normal.  I then advanced a cone-tipped catheter and shot a right retrograde pyelogram.  No filling defects were seen.  All the calices were sharp.  No points of obstruction were seen.  The same was done on the left side with a cone-tipped catheter and contrast.  No filling defects were seen.  All calices were sharp.  No points of obstruction were seen.  I then examined the bladder.  Again, there were no lesions noted.  I drained the bladder and removed the scope and that was the end of the procedure.  The patient will be discharged home. She will follow up with me in 6 weeks.          JONY NOVAK MD      D:  2025 16:59:55     T:

## 2025-03-12 ENCOUNTER — PROCEDURE VISIT (OUTPATIENT)
Dept: PHYSICAL MEDICINE AND REHAB | Age: 65
End: 2025-03-12
Payer: MEDICARE

## 2025-03-12 VITALS
HEIGHT: 58 IN | WEIGHT: 118.8 LBS | HEART RATE: 88 BPM | SYSTOLIC BLOOD PRESSURE: 112 MMHG | DIASTOLIC BLOOD PRESSURE: 65 MMHG | BODY MASS INDEX: 24.94 KG/M2

## 2025-03-12 DIAGNOSIS — G24.3 CERVICAL DYSTONIA: Primary | ICD-10-CM

## 2025-03-12 PROCEDURE — 64616 CHEMODENERV MUSC NECK DYSTON: CPT | Performed by: PHYSICAL MEDICINE & REHABILITATION

## 2025-03-12 PROCEDURE — 95874 GUIDE NERV DESTR NEEDLE EMG: CPT | Performed by: PHYSICAL MEDICINE & REHABILITATION

## 2025-03-12 NOTE — PROGRESS NOTES
[facility] PHYSICAL MEDICINE & REHABILITATION  [address] [address]  Dept: [phone]  Dept Fax: [phone]    Outpatient Followup Note    Rajani Iglesias, 64 y.o., female, presents for follow up c/o of Spasms  .     HPI:     History of Present Illness         HPI  Patient with cervical dystonia who is being seen today for Botox management. She notes return of muscle tone and associated pain as previous treatment wore off.     Past Medical History:   Diagnosis Date    Anxiety     Arthritis     At maximum risk for fall 12/29/2021    caudal equina syndrome and cervical stenosis    Breast cancer ([facility])     Brief psychotic disorder ([facility]) 01/05/2024    Cancer ([facility])     right breast    Cauda equina syndrome ([facility]) 12/29/2021    neuropathy, mobility difficulty, incontinance    Cerebrovascular accident ([facility]) 01/01/2024    Cervical stenosis of spine 12/29/2021    GERD (gastroesophageal reflux disease)     History of stomach ulcers     History of therapeutic radiation     Hx antineoplastic chemo     Hypertension     Hypothyroidism     Lumbar neuritis     NSTEMI (non-ST elevated myocardial infarction) ([facility]) 12/13/2023    Post laminectomy syndrome     Spinal deformity 11/2021    cervical and lumbar    Thyroid disease     Uses wheelchair     Wears dentures     Wellness examination     Dr. MARY LOU Brasher      Past Surgical History:   Procedure Laterality Date    BACK SURGERY      lower back x 3, cervical- x 1: C4- C6, 11/4/2014     BREAST SURGERY Right     mastectomy with reconstruction    CERVICAL FUSION N/A 4/12/2022    C5 CORPECTOMY, USE OF INTRAOPERATIVE CERVICAL TRACTION performed by Linda Ludwig DO at [facility] OR    CERVICAL FUSION N/A 4/12/2022    POSTERIOR FIXATION C2-C7 performed by Linda Ludwig DO at [facility] OR    COLONOSCOPY  about 2018    CT BIOPSY BONE MARROW  2/5/2025    CT BIOPSY BONE MARROW 2/5/2025 [facility] CT SCAN    CYSTOSCOPY Bilateral 3/7/2025

## 2025-03-24 DIAGNOSIS — R00.0 SINUS TACHYCARDIA: ICD-10-CM

## 2025-03-24 DIAGNOSIS — F51.01 PRIMARY INSOMNIA: ICD-10-CM

## 2025-03-24 RX ORDER — QUETIAPINE FUMARATE 25 MG/1
25 TABLET, FILM COATED ORAL NIGHTLY
Qty: 30 TABLET | Refills: 1 | Status: SHIPPED | OUTPATIENT
Start: 2025-03-24

## 2025-03-24 RX ORDER — METOPROLOL TARTRATE 25 MG/1
TABLET, FILM COATED ORAL
Qty: 30 TABLET | Refills: 0 | Status: SHIPPED | OUTPATIENT
Start: 2025-03-24

## 2025-03-24 NOTE — TELEPHONE ENCOUNTER
Please Approve or Refuse.  Send to Pharmacy per Pt's Request: TAM      Next Visit Date:  5/7/2025   Last Visit Date: 2/3/2025    Hemoglobin A1C (%)   Date Value   12/18/2023 3.9 (L)             ( goal A1C is < 7)   BP Readings from Last 3 Encounters:   03/12/25 112/65   03/07/25 (!) 119/57   03/06/25 121/72          (goal 120/80)  BUN   Date Value Ref Range Status   11/25/2024 25 (H) 8 - 23 mg/dL Final     Creatinine   Date Value Ref Range Status   11/25/2024 1.1 (H) 0.6 - 0.9 mg/dL Final     Potassium   Date Value Ref Range Status   11/25/2024 4.3 3.7 - 5.3 mmol/L Final

## 2025-03-25 ENCOUNTER — OFFICE VISIT (OUTPATIENT)
Dept: ONCOLOGY | Age: 65
End: 2025-03-25
Payer: MEDICARE

## 2025-03-25 VITALS
DIASTOLIC BLOOD PRESSURE: 77 MMHG | SYSTOLIC BLOOD PRESSURE: 126 MMHG | TEMPERATURE: 97.7 F | WEIGHT: 119 LBS | OXYGEN SATURATION: 98 % | BODY MASS INDEX: 24.98 KG/M2 | HEART RATE: 78 BPM | HEIGHT: 58 IN

## 2025-03-25 DIAGNOSIS — Z85.3 HISTORY OF BREAST CANCER: ICD-10-CM

## 2025-03-25 DIAGNOSIS — D89.0 POLYCLONAL GAMMOPATHY DETERMINED BY SERUM PROTEIN ELECTROPHORESIS: Primary | ICD-10-CM

## 2025-03-25 DIAGNOSIS — D53.9 MACROCYTIC ANEMIA: ICD-10-CM

## 2025-03-25 PROCEDURE — G8427 DOCREV CUR MEDS BY ELIG CLIN: HCPCS | Performed by: INTERNAL MEDICINE

## 2025-03-25 PROCEDURE — G8420 CALC BMI NORM PARAMETERS: HCPCS | Performed by: INTERNAL MEDICINE

## 2025-03-25 PROCEDURE — 99214 OFFICE O/P EST MOD 30 MIN: CPT | Performed by: INTERNAL MEDICINE

## 2025-03-25 PROCEDURE — 1036F TOBACCO NON-USER: CPT | Performed by: INTERNAL MEDICINE

## 2025-03-25 PROCEDURE — 3017F COLORECTAL CA SCREEN DOC REV: CPT | Performed by: INTERNAL MEDICINE

## 2025-03-25 NOTE — PROGRESS NOTES
(gastroesophageal reflux disease)     History of stomach ulcers     History of therapeutic radiation     Hx antineoplastic chemo     Hypertension     Hypothyroidism     Lumbar neuritis     NSTEMI (non-ST elevated myocardial infarction) ([facility]) 12/13/2023    Post laminectomy syndrome     Spinal deformity 11/2021    cervical and lumbar    Thyroid disease     Uses wheelchair     Wears dentures     Wellness examination     Dr. MARY LOU Brasher       Past Surgical History:   Procedure Laterality Date    BACK SURGERY      lower back x 3, cervical- x 1: C4- C6, 11/4/2014     BREAST SURGERY Right     mastectomy with reconstruction    CERVICAL FUSION N/A 4/12/2022    C5 CORPECTOMY, USE OF INTRAOPERATIVE CERVICAL TRACTION performed by Linda Ludwig DO at [facility] OR    CERVICAL FUSION N/A 4/12/2022    POSTERIOR FIXATION C2-C7 performed by Linda Ludwig DO at [facility] OR    COLONOSCOPY  about 2018    CT BIOPSY BONE MARROW  2/5/2025    CT BIOPSY BONE MARROW 2/5/2025 [facility] CT SCAN    CYSTOSCOPY Bilateral 3/7/2025    CYSTOSCOPY RETROGRADE PYELOGRAM performed by Jony Moss MD at [facility] OR    HERNIA REPAIR Bilateral     inguinal    HYSTERECTOMY, TOTAL ABDOMINAL (CERVIX REMOVED)      LUMBAR SPINE SURGERY N/A 12/30/2021    LUMBAR LAMINECTOMY L2-S1 performed by Linda Ludwig DO at [facility] OR    MASTECTOMY, RADICAL      OTHER SURGICAL HISTORY  04/12/2022    C5 CORPECTOMY, USE OF INTRAOPERATIVE CERVICAL TRACTION (N/A Spine Cervical)     SIGMOID COLECTOMY N/A 7/2/2024    LAPAROTOMY EXPLORATORY W/COLECTOMY, ILEOSTOMY RIGHT LOWER QUADRANT performed by Saad Bustillo DO at [facility] OR       Allergies   Allergen Reactions    Latex Hives, Itching, Dermatitis and Rash    Buspar [Buspirone] Other (See Comments)     Pt consistently declines this med, stating it makes her ears ring.    Kiwi Extract      Face swelling, some difficulty breathing    Crab Extract Itching     throat    Other      ALLERGIC TO KIWI EXTRACT       Current Outpatient Medications

## 2025-03-27 ENCOUNTER — HOSPITAL ENCOUNTER (OUTPATIENT)
Dept: GENERAL RADIOLOGY | Age: 65
Discharge: HOME | End: 2025-03-29
Payer: MEDICARE

## 2025-03-27 DIAGNOSIS — Z93.2 ILEOSTOMY IN PLACE (HCC): ICD-10-CM

## 2025-03-27 PROCEDURE — 74270 X-RAY XM COLON 1CNTRST STD: CPT

## 2025-03-27 PROCEDURE — 6360000004 HC RX CONTRAST MEDICATION: Performed by: NURSE PRACTITIONER

## 2025-03-27 RX ORDER — DIATRIZOATE MEGLUMINE AND DIATRIZOATE SODIUM 660; 100 MG/ML; MG/ML
1000 SOLUTION ORAL; RECTAL
Status: DISCONTINUED | OUTPATIENT
Start: 2025-03-27 | End: 2025-03-30 | Stop reason: HOSPADM

## 2025-03-27 RX ADMIN — DIATRIZOATE MEGLUMINE AND DIATRIZOATE SODIUM 1000 ML: 660; 100 LIQUID ORAL; RECTAL at 11:37

## 2025-03-28 ENCOUNTER — RESULTS FOLLOW-UP (OUTPATIENT)
Dept: SURGERY | Age: 65
End: 2025-03-28

## 2025-03-28 ENCOUNTER — HOSPITAL ENCOUNTER (OUTPATIENT)
Age: 65
Setting detail: SPECIMEN
Discharge: HOME | End: 2025-03-28

## 2025-03-28 DIAGNOSIS — D53.9 MACROCYTIC ANEMIA: ICD-10-CM

## 2025-03-28 LAB
ALBUMIN SERPL-MCNC: 4.1 G/DL (ref 3.5–5.2)
ALBUMIN/GLOB SERPL: 1 {RATIO} (ref 1–2.5)
ALP SERPL-CCNC: 75 U/L (ref 35–104)
ALT SERPL-CCNC: 19 U/L (ref 10–35)
ANION GAP SERPL CALCULATED.3IONS-SCNC: 12 MMOL/L (ref 9–16)
AST SERPL-CCNC: 24 U/L (ref 10–35)
BASOPHILS # BLD: 0.03 K/UL (ref 0–0.2)
BASOPHILS NFR BLD: 1 % (ref 0–2)
BILIRUB SERPL-MCNC: 0.5 MG/DL (ref 0–1.2)
BUN SERPL-MCNC: 28 MG/DL (ref 8–23)
CALCIUM SERPL-MCNC: 9.9 MG/DL (ref 8.6–10.4)
CHLORIDE SERPL-SCNC: 103 MMOL/L (ref 98–107)
CO2 SERPL-SCNC: 22 MMOL/L (ref 20–31)
CREAT SERPL-MCNC: 1 MG/DL (ref 0.6–0.9)
EOSINOPHIL # BLD: 0.09 K/UL (ref 0–0.44)
EOSINOPHILS RELATIVE PERCENT: 2 % (ref 1–4)
ERYTHROCYTE [DISTWIDTH] IN BLOOD BY AUTOMATED COUNT: 12.2 % (ref 11.8–14.4)
FOLATE SERPL-MCNC: >40 NG/ML (ref 4.8–24.2)
GFR, ESTIMATED: 63 ML/MIN/1.73M2
GLUCOSE SERPL-MCNC: 102 MG/DL (ref 74–99)
HCT VFR BLD AUTO: 38.2 % (ref 36.3–47.1)
HGB BLD-MCNC: 12 G/DL (ref 11.9–15.1)
IMM GRANULOCYTES # BLD AUTO: <0.03 K/UL (ref 0–0.3)
IMM GRANULOCYTES NFR BLD: 0 %
IRON SATN MFR SERPL: 48 % (ref 20–55)
IRON SERPL-MCNC: 165 UG/DL (ref 37–145)
LYMPHOCYTES NFR BLD: 1.38 K/UL (ref 1.1–3.7)
LYMPHOCYTES RELATIVE PERCENT: 27 % (ref 24–43)
MCH RBC QN AUTO: 30.7 PG (ref 25.2–33.5)
MCHC RBC AUTO-ENTMCNC: 31.4 G/DL (ref 28.4–34.8)
MCV RBC AUTO: 97.7 FL (ref 82.6–102.9)
MONOCYTES NFR BLD: 0.38 K/UL (ref 0.1–1.2)
MONOCYTES NFR BLD: 7 % (ref 3–12)
NEUTROPHILS NFR BLD: 63 % (ref 36–65)
NEUTS SEG NFR BLD: 3.24 K/UL (ref 1.5–8.1)
NRBC BLD-RTO: 0 PER 100 WBC
PLATELET # BLD AUTO: 224 K/UL (ref 138–453)
PMV BLD AUTO: 10 FL (ref 8.1–13.5)
POTASSIUM SERPL-SCNC: 4.4 MMOL/L (ref 3.7–5.3)
PROT SERPL-MCNC: 8.2 G/DL (ref 6.6–8.7)
RBC # BLD AUTO: 3.91 M/UL (ref 3.95–5.11)
SODIUM SERPL-SCNC: 137 MMOL/L (ref 136–145)
TIBC SERPL-MCNC: 343 UG/DL (ref 250–450)
UNSATURATED IRON BINDING CAPACITY: 178 UG/DL (ref 112–347)
VIT B12 SERPL-MCNC: 601 PG/ML (ref 232–1245)
WBC OTHER # BLD: 5.1 K/UL (ref 3.5–11.3)

## 2025-03-28 NOTE — TELEPHONE ENCOUNTER
This patient needs follow-up office appointment with me to discuss barium enema results and further intervention.

## 2025-04-01 DIAGNOSIS — F41.8 ANXIETY WITH DEPRESSION: ICD-10-CM

## 2025-04-01 RX ORDER — HYDROXYZINE HYDROCHLORIDE 10 MG/1
10 TABLET, FILM COATED ORAL EVERY 12 HOURS PRN
Qty: 60 TABLET | Refills: 0 | Status: SHIPPED | OUTPATIENT
Start: 2025-04-01

## 2025-04-01 NOTE — TELEPHONE ENCOUNTER
Please Approve or Refuse.  Send to Pharmacy per Pt's Request:      Next Visit Date:  5/7/2025   Last Visit Date: 2/3/2025    Hemoglobin A1C (%)   Date Value   12/18/2023 3.9 (L)             ( goal A1C is < 7)   BP Readings from Last 3 Encounters:   03/25/25 126/77   03/12/25 112/65   03/07/25 (!) 119/57          (goal 120/80)  BUN   Date Value Ref Range Status   03/28/2025 28 (H) 8 - 23 mg/dL Final     Creatinine   Date Value Ref Range Status   03/28/2025 1.0 (H) 0.6 - 0.9 mg/dL Final     Potassium   Date Value Ref Range Status   03/28/2025 4.4 3.7 - 5.3 mmol/L Final

## 2025-04-06 DIAGNOSIS — R39.81 FUNCTIONAL URINARY INCONTINENCE: ICD-10-CM

## 2025-04-07 RX ORDER — TAMSULOSIN HYDROCHLORIDE 0.4 MG/1
0.4 CAPSULE ORAL DAILY
Qty: 30 CAPSULE | Refills: 3 | Status: SHIPPED | OUTPATIENT
Start: 2025-04-07

## 2025-04-09 ENCOUNTER — TELEPHONE (OUTPATIENT)
Age: 65
End: 2025-04-09

## 2025-04-15 ENCOUNTER — OFFICE VISIT (OUTPATIENT)
Dept: UROLOGY | Age: 65
End: 2025-04-15
Payer: MEDICARE

## 2025-04-15 VITALS
HEART RATE: 93 BPM | BODY MASS INDEX: 24.98 KG/M2 | SYSTOLIC BLOOD PRESSURE: 114 MMHG | HEIGHT: 58 IN | DIASTOLIC BLOOD PRESSURE: 72 MMHG | TEMPERATURE: 97.6 F | OXYGEN SATURATION: 98 % | WEIGHT: 119 LBS

## 2025-04-15 DIAGNOSIS — R33.9 URINE RETENTION: Primary | ICD-10-CM

## 2025-04-15 DIAGNOSIS — N39.0 RECURRENT UTI: ICD-10-CM

## 2025-04-15 PROCEDURE — 1036F TOBACCO NON-USER: CPT | Performed by: UROLOGY

## 2025-04-15 PROCEDURE — 3017F COLORECTAL CA SCREEN DOC REV: CPT | Performed by: UROLOGY

## 2025-04-15 PROCEDURE — 99213 OFFICE O/P EST LOW 20 MIN: CPT | Performed by: UROLOGY

## 2025-04-15 PROCEDURE — G8427 DOCREV CUR MEDS BY ELIG CLIN: HCPCS | Performed by: UROLOGY

## 2025-04-15 PROCEDURE — G8420 CALC BMI NORM PARAMETERS: HCPCS | Performed by: UROLOGY

## 2025-04-15 ASSESSMENT — ENCOUNTER SYMPTOMS
GASTROINTESTINAL NEGATIVE: 1
VOMITING: 0
ABDOMINAL PAIN: 0
WHEEZING: 0
ALLERGIC/IMMUNOLOGIC NEGATIVE: 1
EYES NEGATIVE: 1
EYE REDNESS: 0
BACK PAIN: 0
COUGH: 0
RESPIRATORY NEGATIVE: 1
SHORTNESS OF BREATH: 0
NAUSEA: 0
EYE PAIN: 0

## 2025-04-15 NOTE — PROGRESS NOTES
Review of Systems   Constitutional: Negative.  Negative for activity change, chills and fever.   HENT: Negative.     Eyes: Negative.  Negative for pain, redness and visual disturbance.   Respiratory: Negative.  Negative for cough, shortness of breath and wheezing.    Cardiovascular: Negative.  Negative for chest pain and leg swelling.   Gastrointestinal: Negative.  Negative for abdominal pain, nausea and vomiting.   Endocrine: Negative.    Genitourinary:  Positive for frequency and urgency. Negative for difficulty urinating, dysuria, enuresis, flank pain and hematuria.   Musculoskeletal: Negative.  Negative for back pain, joint swelling and myalgias.   Skin: Negative.  Negative for rash and wound.   Allergic/Immunologic: Negative.    Neurological: Negative.  Negative for dizziness, tremors and numbness.   Hematological: Negative.  Does not bruise/bleed easily.   Psychiatric/Behavioral: Negative.

## 2025-04-15 NOTE — PROGRESS NOTES
[facility], [facility]  [address]  [facility] [address]  Dept: [phone]    [facility] Urology Office Note - Established    Patient:  Rajani Iglesias  YOB: 1960  Date: 4/15/2025    The patient is a 64 y.o. female whopresents today for evaluation of the following problems:   Chief Complaint   Patient presents with    Frequent/Recurrent UTI      6 week       HPI  Here for recurrent uti's. Doing well since I saw her. Cysto was neg. Urinating well. On flomax. Doing fine    Summary of old records: N/A    Additional History: N/A    Procedures Today: N/A    Urinalysis today:  No results found for this visit on 04/15/25.    Imaging Reviewed during this Office Visit: none  (results were independently reviewed by physician and radiology report verified)    AUA Symptom Score (4/15/2025):                               Last BUN and creatinine:  Lab Results   Component Value Date    BUN 28 (H) 03/28/2025     Lab Results   Component Value Date    CREATININE 1.0 (H) 03/28/2025       Additional Lab/Culture results: none    PAST MEDICAL, FAMILY AND SOCIAL HISTORY UPDATE:  Past Medical History:   Diagnosis Date    Anxiety     Arthritis     At maximum risk for fall 12/29/2021    caudal equina syndrome and cervical stenosis    Breast cancer     Brief psychotic disorder ([facility]) 01/05/2024    Cancer ([facility])     right breast    Cauda equina syndrome ([facility]) 12/29/2021    neuropathy, mobility difficulty, incontinance    Cerebrovascular accident ([facility]) 01/01/2024    Cervical stenosis of spine 12/29/2021    GERD (gastroesophageal reflux disease)     History of stomach ulcers     History of therapeutic radiation     Hx antineoplastic chemo     Hypertension     Hypothyroidism     Lumbar neuritis     NSTEMI (non-ST elevated myocardial infarction) ([facility]) 12/13/2023    Post laminectomy syndrome     Spinal deformity 11/2021    cervical and lumbar    Thyroid disease

## 2025-04-23 ENCOUNTER — TELEPHONE (OUTPATIENT)
Dept: PHYSICAL MEDICINE AND REHAB | Age: 65
End: 2025-04-23

## 2025-04-23 NOTE — TELEPHONE ENCOUNTER
I called patient this morning to ask if the botox helped at all this time around.  Pt stated that it helped greatly and her head is not \"falling\" and she is not having any issues at this time.    She is looking forward to getting botox again in June.

## 2025-04-29 DIAGNOSIS — F41.8 ANXIETY WITH DEPRESSION: ICD-10-CM

## 2025-04-29 RX ORDER — HYDROXYZINE HYDROCHLORIDE 10 MG/1
TABLET, FILM COATED ORAL
Qty: 60 TABLET | Refills: 0 | Status: SHIPPED | OUTPATIENT
Start: 2025-04-29

## 2025-04-29 NOTE — TELEPHONE ENCOUNTER
Please Approve or Refuse.  Send to Pharmacy per Pt's Request:      Next Visit Date:  5/7/2025   Last Visit Date: 2/3/2025    Hemoglobin A1C (%)   Date Value   12/18/2023 3.9 (L)             ( goal A1C is < 7)   BP Readings from Last 3 Encounters:   04/15/25 114/72   03/25/25 126/77   03/12/25 112/65          (goal 120/80)  BUN   Date Value Ref Range Status   03/28/2025 28 (H) 8 - 23 mg/dL Final     Creatinine   Date Value Ref Range Status   03/28/2025 1.0 (H) 0.6 - 0.9 mg/dL Final     Potassium   Date Value Ref Range Status   03/28/2025 4.4 3.7 - 5.3 mmol/L Final

## 2025-05-01 DIAGNOSIS — R00.0 SINUS TACHYCARDIA: ICD-10-CM

## 2025-05-01 RX ORDER — METOPROLOL TARTRATE 25 MG/1
12.5 TABLET, FILM COATED ORAL 2 TIMES DAILY
Qty: 60 TABLET | Refills: 0 | Status: SHIPPED | OUTPATIENT
Start: 2025-05-01

## 2025-05-07 ENCOUNTER — OFFICE VISIT (OUTPATIENT)
Dept: FAMILY MEDICINE CLINIC | Age: 65
End: 2025-05-07
Payer: MEDICARE

## 2025-05-07 ENCOUNTER — HOSPITAL ENCOUNTER (OUTPATIENT)
Age: 65
Setting detail: SPECIMEN
Discharge: HOME | End: 2025-05-07
Payer: MEDICARE

## 2025-05-07 ENCOUNTER — RESULTS FOLLOW-UP (OUTPATIENT)
Dept: FAMILY MEDICINE CLINIC | Age: 65
End: 2025-05-07

## 2025-05-07 VITALS
HEIGHT: 58 IN | WEIGHT: 125 LBS | SYSTOLIC BLOOD PRESSURE: 110 MMHG | HEART RATE: 93 BPM | OXYGEN SATURATION: 98 % | DIASTOLIC BLOOD PRESSURE: 70 MMHG | BODY MASS INDEX: 26.24 KG/M2

## 2025-05-07 DIAGNOSIS — D89.0 POLYCLONAL GAMMOPATHY DETERMINED BY SERUM PROTEIN ELECTROPHORESIS: ICD-10-CM

## 2025-05-07 DIAGNOSIS — E03.8 OTHER SPECIFIED HYPOTHYROIDISM: Primary | ICD-10-CM

## 2025-05-07 DIAGNOSIS — N39.0 RECURRENT UTI: ICD-10-CM

## 2025-05-07 DIAGNOSIS — R97.0 ELEVATED CEA: ICD-10-CM

## 2025-05-07 DIAGNOSIS — E83.42 HYPOMAGNESEMIA: ICD-10-CM

## 2025-05-07 DIAGNOSIS — D64.9 NORMOCYTIC ANEMIA: ICD-10-CM

## 2025-05-07 DIAGNOSIS — R31.29 OTHER MICROSCOPIC HEMATURIA: ICD-10-CM

## 2025-05-07 DIAGNOSIS — M81.0 POST-MENOPAUSAL OSTEOPOROSIS: ICD-10-CM

## 2025-05-07 DIAGNOSIS — Z93.2 ILEOSTOMY IN PLACE (HCC): ICD-10-CM

## 2025-05-07 PROBLEM — R63.4 WEIGHT LOSS, ABNORMAL: Status: RESOLVED | Noted: 2020-07-27 | Resolved: 2025-05-07

## 2025-05-07 PROBLEM — E87.6 HYPOKALEMIA: Status: RESOLVED | Noted: 2017-01-03 | Resolved: 2025-05-07

## 2025-05-07 PROBLEM — E44.0 MODERATE PROTEIN-CALORIE MALNUTRITION: Status: RESOLVED | Noted: 2020-08-05 | Resolved: 2025-05-07

## 2025-05-07 PROBLEM — E83.52 HYPERCALCEMIA: Status: RESOLVED | Noted: 2024-12-12 | Resolved: 2025-05-07

## 2025-05-07 PROBLEM — K85.00 IDIOPATHIC ACUTE PANCREATITIS WITHOUT INFECTION OR NECROSIS: Status: RESOLVED | Noted: 2024-11-03 | Resolved: 2025-05-07

## 2025-05-07 LAB
BACTERIA URNS QL MICRO: ABNORMAL
BILIRUB UR QL STRIP: NEGATIVE
CASTS #/AREA URNS LPF: ABNORMAL /LPF
CEA SERPL-MCNC: 2.5 NG/ML (ref 0–3.8)
CLARITY UR: CLEAR
COLOR UR: YELLOW
EPI CELLS #/AREA URNS HPF: ABNORMAL /HPF
GLUCOSE UR STRIP-MCNC: NEGATIVE MG/DL
HGB UR QL STRIP.AUTO: ABNORMAL
KETONES UR STRIP-MCNC: NEGATIVE MG/DL
LEUKOCYTE ESTERASE UR QL STRIP: ABNORMAL
MAGNESIUM SERPL-MCNC: 2 MG/DL (ref 1.6–2.4)
NITRITE UR QL STRIP: NEGATIVE
PH UR STRIP: 6 [PH] (ref 5–8)
PROT UR STRIP-MCNC: ABNORMAL MG/DL
RBC #/AREA URNS HPF: ABNORMAL /HPF
SP GR UR STRIP: 1.02 (ref 1–1.03)
UROBILINOGEN UR STRIP-ACNC: NORMAL EU/DL (ref 0–1)
WBC #/AREA URNS HPF: ABNORMAL /HPF

## 2025-05-07 PROCEDURE — 36415 COLL VENOUS BLD VENIPUNCTURE: CPT

## 2025-05-07 PROCEDURE — 81001 URINALYSIS AUTO W/SCOPE: CPT

## 2025-05-07 PROCEDURE — 3017F COLORECTAL CA SCREEN DOC REV: CPT | Performed by: FAMILY MEDICINE

## 2025-05-07 PROCEDURE — G8417 CALC BMI ABV UP PARAM F/U: HCPCS | Performed by: FAMILY MEDICINE

## 2025-05-07 PROCEDURE — G8427 DOCREV CUR MEDS BY ELIG CLIN: HCPCS | Performed by: FAMILY MEDICINE

## 2025-05-07 PROCEDURE — 99214 OFFICE O/P EST MOD 30 MIN: CPT | Performed by: FAMILY MEDICINE

## 2025-05-07 PROCEDURE — 83735 ASSAY OF MAGNESIUM: CPT

## 2025-05-07 PROCEDURE — 82378 CARCINOEMBRYONIC ANTIGEN: CPT

## 2025-05-07 PROCEDURE — 1036F TOBACCO NON-USER: CPT | Performed by: FAMILY MEDICINE

## 2025-05-07 RX ORDER — ZOLEDRONIC ACID 0.05 MG/ML
5 INJECTION, SOLUTION INTRAVENOUS ONCE
Qty: 100 ML | Refills: 0 | Status: SHIPPED | OUTPATIENT
Start: 2025-05-07 | End: 2025-05-07

## 2025-05-07 SDOH — ECONOMIC STABILITY: FOOD INSECURITY: WITHIN THE PAST 12 MONTHS, THE FOOD YOU BOUGHT JUST DIDN'T LAST AND YOU DIDN'T HAVE MONEY TO GET MORE.: NEVER TRUE

## 2025-05-07 SDOH — ECONOMIC STABILITY: FOOD INSECURITY: WITHIN THE PAST 12 MONTHS, YOU WORRIED THAT YOUR FOOD WOULD RUN OUT BEFORE YOU GOT MONEY TO BUY MORE.: NEVER TRUE

## 2025-05-07 ASSESSMENT — ENCOUNTER SYMPTOMS
CONSTIPATION: 0
COUGH: 0
BLOOD IN STOOL: 0
BACK PAIN: 0
NAUSEA: 0
RHINORRHEA: 0
RECTAL PAIN: 0
STRIDOR: 0
WHEEZING: 0
COLOR CHANGE: 0
DIARRHEA: 0
SHORTNESS OF BREATH: 0
CHEST TIGHTNESS: 0
EYE REDNESS: 0
SINUS PRESSURE: 0
SORE THROAT: 0
TROUBLE SWALLOWING: 0
ABDOMINAL DISTENTION: 0
ABDOMINAL PAIN: 0
VOMITING: 0

## 2025-05-07 ASSESSMENT — PATIENT HEALTH QUESTIONNAIRE - PHQ9
SUM OF ALL RESPONSES TO PHQ QUESTIONS 1-9: 0
9. THOUGHTS THAT YOU WOULD BE BETTER OFF DEAD, OR OF HURTING YOURSELF: NOT AT ALL
6. FEELING BAD ABOUT YOURSELF - OR THAT YOU ARE A FAILURE OR HAVE LET YOURSELF OR YOUR FAMILY DOWN: NOT AT ALL
8. MOVING OR SPEAKING SO SLOWLY THAT OTHER PEOPLE COULD HAVE NOTICED. OR THE OPPOSITE, BEING SO FIGETY OR RESTLESS THAT YOU HAVE BEEN MOVING AROUND A LOT MORE THAN USUAL: NOT AT ALL
SUM OF ALL RESPONSES TO PHQ QUESTIONS 1-9: 0
4. FEELING TIRED OR HAVING LITTLE ENERGY: NOT AT ALL
SUM OF ALL RESPONSES TO PHQ QUESTIONS 1-9: 0
5. POOR APPETITE OR OVEREATING: NOT AT ALL
1. LITTLE INTEREST OR PLEASURE IN DOING THINGS: NOT AT ALL
SUM OF ALL RESPONSES TO PHQ QUESTIONS 1-9: 0
2. FEELING DOWN, DEPRESSED OR HOPELESS: NOT AT ALL
3. TROUBLE FALLING OR STAYING ASLEEP: NOT AT ALL
10. IF YOU CHECKED OFF ANY PROBLEMS, HOW DIFFICULT HAVE THESE PROBLEMS MADE IT FOR YOU TO DO YOUR WORK, TAKE CARE OF THINGS AT HOME, OR GET ALONG WITH OTHER PEOPLE: NOT DIFFICULT AT ALL
7. TROUBLE CONCENTRATING ON THINGS, SUCH AS READING THE NEWSPAPER OR WATCHING TELEVISION: NOT AT ALL

## 2025-05-07 NOTE — PROGRESS NOTES
Chief Complaint   Patient presents with    Hypothyroidism    Other     On and off urine pressure states she did see the urologist.     The patient (or guardian, if applicable) and other individuals in attendance with the patient were advised that Artificial Intelligence will be utilized during this visit to record, process the conversation to generate a clinical note, and support improvement of the AI technology. The patient (or guardian, if applicable) and other individuals in attendance at the appointment consented to the use of AI, including the recording.                    Hypothyroidism and Other (On and off urine pressure states she did see the urologist.)      History of Present Illness  The patient presents for a 3-month follow-up appointment for chronic medical problems. She is accompanied by her granddaughter.    She underwent a bone marrow biopsy, and the results were discussed with her oncologist. The findings were normal, with no evidence of malignancy in the bone marrow. There are small polyclonal cells present at 5% and myeloblasts at 2.5%, but treatment is not required at this time. The plan is to continue monitoring her condition every 6 months.  Recent blood work is stable.  Patient is under the care of oncologist and is monitored.    She consulted a urologist due to hematuria, who performed a cystoscopy that yielded normal results. However, she continues to experience urinary retention, which she believes is the cause of her symptoms. She is currently on Flomax, as previously prescribed. She has not yet undergone the renal ultrasound ordered by Dr. Chisholm in 02/2025. She is seeking a urine test to confirm the resolution of her urinary issues. She reports occasional straining during urination, typically in the evenings.    She has been experiencing weight gain, which she finds perplexing as there have been no alterations in her dietary habits. She has been advised to take folic acid every other day

## 2025-05-07 NOTE — PROGRESS NOTES
Visit Information    Have you changed or started any medications since your last visit including any over-the-counter medicines, vitamins, or herbal medicines? no   Are you having any side effects from any of your medications? -  no  Have you stopped taking any of your medications? Is so, why? -  no    Have you seen any other physician or provider since your last visit? No  Have you had any other diagnostic tests since your last visit? No  Have you been seen in the emergency room and/or had an admission to a hospital since we last saw you? No  Have you had your routine dental cleaning in the past 6 months? no    Have you activated your [employer] account? If not, what are your barriers? Yes     Patient Care Team:  Kailey Pepe MD as PCP - General (Family Medicine)  Kailey Pepe MD as PCP - Empaneled Provider  Giovanna Brasher MD (Family Medicine)  Sugey Morgan MD as Consulting Physician (Gastroenterology)  Sugey Morgan MD as Consulting Physician (Gastroenterology)  Jony Moss MD as Consulting Physician (Urology)    Medical History Review  Past Medical, Family, and Social History reviewed and does contribute to the patient presenting condition    Health Maintenance   Topic Date Due    Shingles vaccine (1 of 2) Never done    Respiratory Syncytial Virus (RSV) Pregnant or age 60 yrs+ (1 - Risk 60-74 years 1-dose series) Never done    COVID-19 Vaccine (5 - 2024-25 season) 09/01/2024    Lipids  10/07/2025    Depression Monitoring  02/03/2026    Breast cancer screen  02/12/2026    Colorectal Cancer Screen  07/09/2028    DTaP/Tdap/Td vaccine (2 - Td or Tdap) 07/25/2029    Annual Wellness Visit (Medicare Advantage)  Completed    Flu vaccine  Completed    Pneumococcal 50+ years Vaccine  Completed    Hepatitis C screen  Completed    HIV screen  Completed    Hepatitis A vaccine  Aged Out    Hepatitis B vaccine  Aged Out    Hib vaccine  Aged Out    Polio vaccine  Aged Out    Meningococcal (ACWY) vaccine  Aged Out

## 2025-05-08 ENCOUNTER — OFFICE VISIT (OUTPATIENT)
Age: 65
End: 2025-05-08

## 2025-05-08 VITALS
WEIGHT: 124 LBS | DIASTOLIC BLOOD PRESSURE: 66 MMHG | HEART RATE: 88 BPM | OXYGEN SATURATION: 98 % | SYSTOLIC BLOOD PRESSURE: 115 MMHG | HEIGHT: 57 IN | TEMPERATURE: 97.5 F | BODY MASS INDEX: 26.75 KG/M2

## 2025-05-08 DIAGNOSIS — Z90.49 HISTORY OF PARTIAL COLECTOMY: ICD-10-CM

## 2025-05-08 DIAGNOSIS — K81.1 CHRONIC CHOLECYSTITIS: ICD-10-CM

## 2025-05-08 DIAGNOSIS — Z86.79 HISTORY OF CORONARY ARTERY DISEASE: ICD-10-CM

## 2025-05-08 DIAGNOSIS — M81.0 POST-MENOPAUSAL OSTEOPOROSIS: Primary | ICD-10-CM

## 2025-05-08 DIAGNOSIS — R93.3 ABNORMAL BARIUM ENEMA: ICD-10-CM

## 2025-05-08 DIAGNOSIS — Z93.2 ILEOSTOMY IN PLACE (HCC): Primary | ICD-10-CM

## 2025-05-08 RX ORDER — ACETAMINOPHEN 325 MG/1
650 TABLET ORAL
OUTPATIENT
Start: 2025-05-08

## 2025-05-08 RX ORDER — ONDANSETRON 2 MG/ML
8 INJECTION INTRAMUSCULAR; INTRAVENOUS
OUTPATIENT
Start: 2025-05-08

## 2025-05-08 RX ORDER — DIPHENHYDRAMINE HYDROCHLORIDE 50 MG/ML
50 INJECTION, SOLUTION INTRAMUSCULAR; INTRAVENOUS
OUTPATIENT
Start: 2025-05-08

## 2025-05-08 RX ORDER — SODIUM CHLORIDE 0.9 % (FLUSH) 0.9 %
5-40 SYRINGE (ML) INJECTION PRN
OUTPATIENT
Start: 2025-05-08

## 2025-05-08 RX ORDER — SODIUM CHLORIDE 9 MG/ML
5-250 INJECTION, SOLUTION INTRAVENOUS PRN
OUTPATIENT
Start: 2025-05-08

## 2025-05-08 RX ORDER — EPINEPHRINE 1 MG/ML
0.3 INJECTION, SOLUTION, CONCENTRATE INTRAVENOUS PRN
OUTPATIENT
Start: 2025-05-08

## 2025-05-08 RX ORDER — SODIUM CHLORIDE 9 MG/ML
INJECTION, SOLUTION INTRAVENOUS CONTINUOUS
OUTPATIENT
Start: 2025-05-08

## 2025-05-08 RX ORDER — ALBUTEROL SULFATE 90 UG/1
4 INHALANT RESPIRATORY (INHALATION) PRN
OUTPATIENT
Start: 2025-05-08

## 2025-05-08 RX ORDER — HEPARIN 100 UNIT/ML
500 SYRINGE INTRAVENOUS PRN
OUTPATIENT
Start: 2025-05-08

## 2025-05-08 RX ORDER — ZOLEDRONIC ACID 0.05 MG/ML
5 INJECTION, SOLUTION INTRAVENOUS ONCE
OUTPATIENT
Start: 2025-05-08 | End: 2025-05-08

## 2025-05-08 RX ORDER — HYDROCORTISONE SODIUM SUCCINATE 100 MG/2ML
100 INJECTION INTRAMUSCULAR; INTRAVENOUS
OUTPATIENT
Start: 2025-05-08

## 2025-05-13 ENCOUNTER — HOSPITAL ENCOUNTER (OUTPATIENT)
Dept: WOMENS IMAGING | Age: 65
Discharge: HOME | End: 2025-05-15
Attending: UROLOGY
Payer: MEDICARE

## 2025-05-13 DIAGNOSIS — R31.29 OTHER MICROSCOPIC HEMATURIA: ICD-10-CM

## 2025-05-13 PROCEDURE — 76775 US EXAM ABDO BACK WALL LIM: CPT

## 2025-05-20 ENCOUNTER — HOSPITAL ENCOUNTER (OUTPATIENT)
Dept: INFUSION THERAPY | Age: 65
Setting detail: INFUSION SERIES
Discharge: HOME | End: 2025-05-20
Payer: MEDICARE

## 2025-05-20 VITALS
DIASTOLIC BLOOD PRESSURE: 56 MMHG | TEMPERATURE: 97.1 F | OXYGEN SATURATION: 98 % | HEART RATE: 86 BPM | RESPIRATION RATE: 16 BRPM | SYSTOLIC BLOOD PRESSURE: 111 MMHG

## 2025-05-20 DIAGNOSIS — M81.0 POST-MENOPAUSAL OSTEOPOROSIS: Primary | ICD-10-CM

## 2025-05-20 LAB
ALBUMIN SERPL-MCNC: 4.1 G/DL (ref 3.5–5.2)
ALP SERPL-CCNC: 83 U/L (ref 35–104)
ALT SERPL-CCNC: 26 U/L (ref 10–35)
ANION GAP SERPL CALCULATED.3IONS-SCNC: 10 MMOL/L (ref 9–16)
AST SERPL-CCNC: 28 U/L (ref 10–35)
BILIRUB SERPL-MCNC: 0.4 MG/DL (ref 0–1.2)
BUN SERPL-MCNC: 28 MG/DL (ref 8–23)
CALCIUM SERPL-MCNC: 9.1 MG/DL (ref 8.6–10.4)
CHLORIDE SERPL-SCNC: 109 MMOL/L (ref 98–107)
CO2 SERPL-SCNC: 18 MMOL/L (ref 20–31)
CREAT SERPL-MCNC: 1.3 MG/DL (ref 0.7–1.2)
GFR, ESTIMATED: 46 ML/MIN/1.73M2
GLUCOSE SERPL-MCNC: 102 MG/DL (ref 74–99)
POTASSIUM SERPL-SCNC: 4.5 MMOL/L (ref 3.7–5.3)
PROT SERPL-MCNC: 7.9 G/DL (ref 6.6–8.7)
SODIUM SERPL-SCNC: 137 MMOL/L (ref 136–145)

## 2025-05-20 PROCEDURE — 2580000003 HC RX 258: Performed by: FAMILY MEDICINE

## 2025-05-20 PROCEDURE — 6360000002 HC RX W HCPCS: Performed by: FAMILY MEDICINE

## 2025-05-20 PROCEDURE — 96365 THER/PROPH/DIAG IV INF INIT: CPT

## 2025-05-20 PROCEDURE — 80053 COMPREHEN METABOLIC PANEL: CPT

## 2025-05-20 RX ORDER — EPINEPHRINE 1 MG/ML
0.3 INJECTION, SOLUTION, CONCENTRATE INTRAVENOUS PRN
OUTPATIENT
Start: 2026-05-17

## 2025-05-20 RX ORDER — HEPARIN 100 UNIT/ML
500 SYRINGE INTRAVENOUS PRN
OUTPATIENT
Start: 2026-05-17

## 2025-05-20 RX ORDER — SODIUM CHLORIDE 9 MG/ML
5-250 INJECTION, SOLUTION INTRAVENOUS PRN
OUTPATIENT
Start: 2026-05-17

## 2025-05-20 RX ORDER — SODIUM CHLORIDE 9 MG/ML
INJECTION, SOLUTION INTRAVENOUS CONTINUOUS
OUTPATIENT
Start: 2026-05-17

## 2025-05-20 RX ORDER — ONDANSETRON 2 MG/ML
8 INJECTION INTRAMUSCULAR; INTRAVENOUS
OUTPATIENT
Start: 2026-05-17

## 2025-05-20 RX ORDER — ALBUTEROL SULFATE 90 UG/1
4 INHALANT RESPIRATORY (INHALATION) PRN
OUTPATIENT
Start: 2026-05-17

## 2025-05-20 RX ORDER — SODIUM CHLORIDE 0.9 % (FLUSH) 0.9 %
5-40 SYRINGE (ML) INJECTION PRN
OUTPATIENT
Start: 2026-05-17

## 2025-05-20 RX ORDER — ACETAMINOPHEN 325 MG/1
650 TABLET ORAL
OUTPATIENT
Start: 2026-05-17

## 2025-05-20 RX ORDER — ZOLEDRONIC ACID 0.05 MG/ML
5 INJECTION, SOLUTION INTRAVENOUS ONCE
Status: COMPLETED | OUTPATIENT
Start: 2025-05-20 | End: 2025-05-20

## 2025-05-20 RX ORDER — SODIUM CHLORIDE 9 MG/ML
5-250 INJECTION, SOLUTION INTRAVENOUS PRN
Status: DISCONTINUED | OUTPATIENT
Start: 2025-05-20 | End: 2025-05-21 | Stop reason: HOSPADM

## 2025-05-20 RX ORDER — DIPHENHYDRAMINE HYDROCHLORIDE 50 MG/ML
50 INJECTION, SOLUTION INTRAMUSCULAR; INTRAVENOUS
OUTPATIENT
Start: 2026-05-17

## 2025-05-20 RX ORDER — ZOLEDRONIC ACID 0.05 MG/ML
5 INJECTION, SOLUTION INTRAVENOUS ONCE
OUTPATIENT
Start: 2026-05-17 | End: 2026-05-17

## 2025-05-20 RX ORDER — HYDROCORTISONE SODIUM SUCCINATE 100 MG/2ML
100 INJECTION INTRAMUSCULAR; INTRAVENOUS
OUTPATIENT
Start: 2026-05-17

## 2025-05-20 RX ADMIN — SODIUM CHLORIDE 100 ML/HR: 0.9 INJECTION, SOLUTION INTRAVENOUS at 11:03

## 2025-05-20 RX ADMIN — ZOLEDRONIC ACID 5 MG: 5 INJECTION INTRAVENOUS at 11:25

## 2025-05-20 NOTE — PROGRESS NOTES
Pt arrived for Reclast infusion.  Vitals obtained.  Labs drawn from PIV started in L AC.  Labs within written parameters. Infusion started at 11:25 and ended at 11:48.  Pt tolerated well.  No s/s adverse reaction noted.  Vitals remained stable as charted.  PIV removed.  Pt discharged home, ambulatory per self with family.

## 2025-05-21 ENCOUNTER — RESULTS FOLLOW-UP (OUTPATIENT)
Dept: FAMILY MEDICINE CLINIC | Age: 65
End: 2025-05-21

## 2025-06-03 DIAGNOSIS — F41.8 ANXIETY WITH DEPRESSION: ICD-10-CM

## 2025-06-03 RX ORDER — HYDROXYZINE HYDROCHLORIDE 10 MG/1
10 TABLET, FILM COATED ORAL 2 TIMES DAILY PRN
Qty: 60 TABLET | Refills: 0 | Status: SHIPPED | OUTPATIENT
Start: 2025-06-03

## 2025-06-03 NOTE — TELEPHONE ENCOUNTER
Please Approve or Refuse.  Send to Pharmacy per Pt's Request:      Next Visit Date:  Visit date not found   Last Visit Date: 5/7/2025    Hemoglobin A1C (%)   Date Value   12/18/2023 3.9 (L)             ( goal A1C is < 7)   BP Readings from Last 3 Encounters:   05/20/25 (!) 111/56   05/08/25 115/66   05/07/25 110/70          (goal 120/80)  BUN   Date Value Ref Range Status   05/20/2025 28 (H) 8 - 23 mg/dL Final     Creatinine   Date Value Ref Range Status   05/20/2025 1.3 (H) 0.7 - 1.2 mg/dL Final     Potassium   Date Value Ref Range Status   05/20/2025 4.5 3.7 - 5.3 mmol/L Final

## 2025-06-04 DIAGNOSIS — F51.01 PRIMARY INSOMNIA: ICD-10-CM

## 2025-06-04 RX ORDER — QUETIAPINE FUMARATE 25 MG/1
25 TABLET, FILM COATED ORAL NIGHTLY
Qty: 30 TABLET | Refills: 1 | Status: SHIPPED | OUTPATIENT
Start: 2025-06-04

## 2025-06-05 ENCOUNTER — TELEPHONE (OUTPATIENT)
Age: 65
End: 2025-06-05

## 2025-06-06 ENCOUNTER — PREP FOR PROCEDURE (OUTPATIENT)
Age: 65
End: 2025-06-06

## 2025-06-06 DIAGNOSIS — Z90.49 HISTORY OF PARTIAL COLECTOMY: ICD-10-CM

## 2025-06-18 ENCOUNTER — PROCEDURE VISIT (OUTPATIENT)
Dept: PHYSICAL MEDICINE AND REHAB | Age: 65
End: 2025-06-18

## 2025-06-18 VITALS
HEIGHT: 57 IN | SYSTOLIC BLOOD PRESSURE: 120 MMHG | TEMPERATURE: 97.9 F | DIASTOLIC BLOOD PRESSURE: 64 MMHG | WEIGHT: 124.6 LBS | HEART RATE: 100 BPM | BODY MASS INDEX: 26.88 KG/M2

## 2025-06-18 DIAGNOSIS — G24.3 CERVICAL DYSTONIA: Primary | ICD-10-CM

## 2025-06-18 NOTE — PROGRESS NOTES
[facility] PHYSICAL MEDICINE & REHABILITATION  [address] [address]  Dept: [phone]  Dept Fax: [phone]    Outpatient Followup Note    Rajani Iglesias, 64 y.o., female, presents for follow up c/o of Spasms  .     HPI:     History of Present Illness         HPI  Patient with cervical dystonia who is being seen today for management with Botox. She responded well to previous dosing and denies any issues with overcorrection of her dystonic tone. She has noted an increase in tone in past few days as she was coming due for her next treatment.     Past Medical History:   Diagnosis Date    Anxiety     Arthritis     At maximum risk for fall 12/29/2021    caudal equina syndrome and cervical stenosis    Breast cancer ([facility])     Brief psychotic disorder ([facility]) 01/05/2024    Cancer ([facility])     right breast    Cauda equina syndrome ([facility]) 12/29/2021    neuropathy, mobility difficulty, incontinance    Cerebrovascular accident ([facility]) 01/01/2024    Cervical stenosis of spine 12/29/2021    GERD (gastroesophageal reflux disease)     History of stomach ulcers     History of therapeutic radiation     Hx antineoplastic chemo     Hypertension     Hypothyroidism     Lumbar neuritis     NSTEMI (non-ST elevated myocardial infarction) ([facility]) 12/13/2023    Post laminectomy syndrome     Spinal deformity 11/2021    cervical and lumbar    Thyroid disease     Uses wheelchair     Wears dentures     Wellness examination     Dr. MARY LOU Brasher      Past Surgical History:   Procedure Laterality Date    BACK SURGERY      lower back x 3, cervical- x 1: C4- C6, 11/4/2014     BREAST SURGERY Right     mastectomy with reconstruction    CERVICAL FUSION N/A 04/12/2022    C5 CORPECTOMY, USE OF INTRAOPERATIVE CERVICAL TRACTION performed by Linda Ludwig DO at [facility] OR    CERVICAL FUSION N/A 04/12/2022    POSTERIOR FIXATION C2-C7 performed by Linda Ludwig DO at [facility] OR    COLONOSCOPY  about

## 2025-06-19 ENCOUNTER — HOSPITAL ENCOUNTER (OUTPATIENT)
Dept: PREADMISSION TESTING | Age: 65
Discharge: HOME | End: 2025-06-23

## 2025-06-19 VITALS — HEIGHT: 58 IN | WEIGHT: 124 LBS | BODY MASS INDEX: 26.03 KG/M2

## 2025-06-19 NOTE — PROGRESS NOTES
Pre-op Instructions For Out-Patient Endoscopy Surgery    Medication Instructions:  Please stop herbs and any supplements now (includes vitamins and minerals).    For these medications:  Dulaglutide (Trulicity), Exenatide (Byetta and Bydureon, Liraglutide (Victoza), Lixisenatide (Adlyxin), Semaglutide (Ozempic and Rybelsus), Tirzepatide (Mounjaro, Zepbound,Wegovy)- Stop 1 week prior if taking weekly or 1 day prior if taking every 12 hours or daily. N/A    Please contact your surgeon and prescribing physician for pre-op instructions for any blood thinners. Ibuprofen ( Aleve )     If you have inhalers/aerosol treatments at home, please use them the morning of your surgery and bring the inhalers with you to the hospital.    Please take the following medications the morning of your surgery with a sip of water:    Diltiazem, Metoprolol, and Levothyroxine     Surgery Instructions:  After midnight before surgery:  Do not eat or drink anything, including water, mints, gum, and hard candy.  You may brush your teeth without swallowing.  No smoking, chewing tobacco, or street drugs.       ** Please Follow Bowel Prep instructions if given by surgeon's office**    Please shower or bathe before surgery.       Please do not wear any cologne, lotion, powder, jewelry, piercings, perfume, makeup, nail polish, hair accessories, or hair spray on the day of surgery.  Wear loose comfortable clothing.    Leave your valuables at home but bring a payment source for any after-surgery prescriptions you plan to fill at [facility].  Bring a storage case for any glasses/contacts.    An adult who is responsible for you MUST remain in the hospital and drive you home and should be with you for the first 24 hours after surgery.     The Day of Surgery:  Arrive at [facility] Surgery Entrance at the time directed by your surgeon and check in at the desk.  7/3/25 @ 6:00 am    If you have a living will or healthcare power of

## 2025-06-27 DIAGNOSIS — R00.0 SINUS TACHYCARDIA: ICD-10-CM

## 2025-06-27 DIAGNOSIS — F41.8 ANXIETY WITH DEPRESSION: ICD-10-CM

## 2025-06-27 RX ORDER — METOPROLOL TARTRATE 25 MG/1
TABLET, FILM COATED ORAL
Qty: 60 TABLET | Refills: 0 | Status: SHIPPED | OUTPATIENT
Start: 2025-06-27

## 2025-06-27 RX ORDER — HYDROXYZINE HYDROCHLORIDE 10 MG/1
TABLET, FILM COATED ORAL
Qty: 60 TABLET | Refills: 0 | Status: SHIPPED | OUTPATIENT
Start: 2025-06-27

## 2025-06-27 NOTE — TELEPHONE ENCOUNTER
Please Approve or Refuse.  Send to Pharmacy per Pt's Request:      Next Visit Date:  Visit date not found   Last Visit Date: 5/7/2025    Hemoglobin A1C (%)   Date Value   12/18/2023 3.9 (L)             ( goal A1C is < 7)   BP Readings from Last 3 Encounters:   06/18/25 120/64   05/20/25 (!) 111/56   05/08/25 115/66          (goal 120/80)  BUN   Date Value Ref Range Status   05/20/2025 28 (H) 8 - 23 mg/dL Final     Creatinine   Date Value Ref Range Status   05/20/2025 1.3 (H) 0.7 - 1.2 mg/dL Final     Potassium   Date Value Ref Range Status   05/20/2025 4.5 3.7 - 5.3 mmol/L Final

## 2025-07-07 DIAGNOSIS — I50.32 CHRONIC DIASTOLIC HEART FAILURE (HCC): ICD-10-CM

## 2025-07-07 RX ORDER — DILTIAZEM HYDROCHLORIDE 240 MG/1
240 CAPSULE, COATED, EXTENDED RELEASE ORAL DAILY
Qty: 30 CAPSULE | Refills: 3 | Status: SHIPPED | OUTPATIENT
Start: 2025-07-07 | End: 2025-11-04

## 2025-07-08 NOTE — PRE-PROCEDURE INSTRUCTIONS
No answer, left message ?                             Unable to leave message ?    When were you told to arrive at hospital ?0600      Do you have a  ?yes    Are you on any blood thinners ?   no                  If yes when did you stop taking ?    Do you have your prep Rx filled and instruction ? N/a     Nothing to eat the day before , only clear liquids.n/a    Are you experiencing any covid symptoms ? no    Do you have any infections or rash we should be aware of ?no      Do you have the Hibiclens soap to use the night before and the morning of surgery ?n/a    Nothing to eat or drink after midnight, only a sip of water to take any medication instructed to take the night before.yes  Wear comfortable clothing, leave any valuables at home, remove any jewelry and body piercing . yes

## 2025-07-09 ENCOUNTER — ANESTHESIA EVENT (OUTPATIENT)
Dept: OPERATING ROOM | Age: 65
End: 2025-07-09
Payer: MEDICARE

## 2025-07-10 ENCOUNTER — HOSPITAL ENCOUNTER (OUTPATIENT)
Age: 65
Setting detail: OUTPATIENT SURGERY
Discharge: HOME | End: 2025-07-10
Attending: SURGERY | Admitting: SURGERY
Payer: MEDICARE

## 2025-07-10 ENCOUNTER — ANESTHESIA (OUTPATIENT)
Dept: OPERATING ROOM | Age: 65
End: 2025-07-10
Payer: MEDICARE

## 2025-07-10 VITALS
BODY MASS INDEX: 27.08 KG/M2 | OXYGEN SATURATION: 98 % | DIASTOLIC BLOOD PRESSURE: 66 MMHG | TEMPERATURE: 98 F | WEIGHT: 129 LBS | RESPIRATION RATE: 16 BRPM | HEIGHT: 58 IN | SYSTOLIC BLOOD PRESSURE: 114 MMHG | HEART RATE: 85 BPM

## 2025-07-10 PROBLEM — R93.3 ABNORMAL BARIUM ENEMA: Status: ACTIVE | Noted: 2025-07-10

## 2025-07-10 PROCEDURE — 2709999900 HC NON-CHARGEABLE SUPPLY: Performed by: SURGERY

## 2025-07-10 PROCEDURE — 7100000011 HC PHASE II RECOVERY - ADDTL 15 MIN: Performed by: SURGERY

## 2025-07-10 PROCEDURE — 2580000003 HC RX 258: Performed by: ANESTHESIOLOGY

## 2025-07-10 PROCEDURE — 6360000002 HC RX W HCPCS: Performed by: ANESTHESIOLOGY

## 2025-07-10 PROCEDURE — 7100000031 HC ASPR PHASE II RECOVERY - ADDTL 15 MIN: Performed by: SURGERY

## 2025-07-10 PROCEDURE — 6360000002 HC RX W HCPCS: Performed by: NURSE ANESTHETIST, CERTIFIED REGISTERED

## 2025-07-10 PROCEDURE — 7100000010 HC PHASE II RECOVERY - FIRST 15 MIN: Performed by: SURGERY

## 2025-07-10 PROCEDURE — 7100000001 HC PACU RECOVERY - ADDTL 15 MIN: Performed by: SURGERY

## 2025-07-10 PROCEDURE — 7100000000 HC PACU RECOVERY - FIRST 15 MIN: Performed by: SURGERY

## 2025-07-10 PROCEDURE — 2500000003 HC RX 250 WO HCPCS: Performed by: ANESTHESIOLOGY

## 2025-07-10 PROCEDURE — 45378 DIAGNOSTIC COLONOSCOPY: CPT | Performed by: SURGERY

## 2025-07-10 PROCEDURE — 7100000030 HC ASPR PHASE II RECOVERY - FIRST 15 MIN: Performed by: SURGERY

## 2025-07-10 PROCEDURE — 3700000001 HC ADD 15 MINUTES (ANESTHESIA): Performed by: SURGERY

## 2025-07-10 PROCEDURE — 3609027000 HC COLONOSCOPY: Performed by: SURGERY

## 2025-07-10 PROCEDURE — 3700000000 HC ANESTHESIA ATTENDED CARE: Performed by: SURGERY

## 2025-07-10 RX ORDER — LIDOCAINE HYDROCHLORIDE 20 MG/ML
INJECTION, SOLUTION EPIDURAL; INFILTRATION; INTRACAUDAL; PERINEURAL
Status: DISCONTINUED | OUTPATIENT
Start: 2025-07-10 | End: 2025-07-10 | Stop reason: SDUPTHER

## 2025-07-10 RX ORDER — ONDANSETRON 2 MG/ML
INJECTION INTRAMUSCULAR; INTRAVENOUS
Status: DISCONTINUED | OUTPATIENT
Start: 2025-07-10 | End: 2025-07-10 | Stop reason: SDUPTHER

## 2025-07-10 RX ORDER — SODIUM CHLORIDE, SODIUM LACTATE, POTASSIUM CHLORIDE, CALCIUM CHLORIDE 600; 310; 30; 20 MG/100ML; MG/100ML; MG/100ML; MG/100ML
INJECTION, SOLUTION INTRAVENOUS CONTINUOUS
Status: DISCONTINUED | OUTPATIENT
Start: 2025-07-10 | End: 2025-07-10 | Stop reason: HOSPADM

## 2025-07-10 RX ORDER — PROPOFOL 10 MG/ML
INJECTION, EMULSION INTRAVENOUS
Status: DISCONTINUED | OUTPATIENT
Start: 2025-07-10 | End: 2025-07-10 | Stop reason: SDUPTHER

## 2025-07-10 RX ORDER — SODIUM CHLORIDE 9 MG/ML
INJECTION, SOLUTION INTRAVENOUS PRN
Status: DISCONTINUED | OUTPATIENT
Start: 2025-07-10 | End: 2025-07-10 | Stop reason: HOSPADM

## 2025-07-10 RX ORDER — DEXAMETHASONE SODIUM PHOSPHATE 4 MG/ML
INJECTION, SOLUTION INTRA-ARTICULAR; INTRALESIONAL; INTRAMUSCULAR; INTRAVENOUS; SOFT TISSUE
Status: DISCONTINUED | OUTPATIENT
Start: 2025-07-10 | End: 2025-07-10 | Stop reason: SDUPTHER

## 2025-07-10 RX ORDER — SODIUM CHLORIDE 0.9 % (FLUSH) 0.9 %
5-40 SYRINGE (ML) INJECTION PRN
Status: DISCONTINUED | OUTPATIENT
Start: 2025-07-10 | End: 2025-07-10 | Stop reason: HOSPADM

## 2025-07-10 RX ORDER — FENTANYL CITRATE 50 UG/ML
INJECTION, SOLUTION INTRAMUSCULAR; INTRAVENOUS
Status: DISCONTINUED | OUTPATIENT
Start: 2025-07-10 | End: 2025-07-10 | Stop reason: SDUPTHER

## 2025-07-10 RX ORDER — SODIUM CHLORIDE 0.9 % (FLUSH) 0.9 %
5-40 SYRINGE (ML) INJECTION EVERY 12 HOURS SCHEDULED
Status: DISCONTINUED | OUTPATIENT
Start: 2025-07-10 | End: 2025-07-10 | Stop reason: HOSPADM

## 2025-07-10 RX ORDER — LIDOCAINE HYDROCHLORIDE 10 MG/ML
1 INJECTION, SOLUTION EPIDURAL; INFILTRATION; INTRACAUDAL; PERINEURAL
Status: COMPLETED | OUTPATIENT
Start: 2025-07-10 | End: 2025-07-10

## 2025-07-10 RX ADMIN — PROPOFOL 150 MG: 10 INJECTION, EMULSION INTRAVENOUS at 09:34

## 2025-07-10 RX ADMIN — SODIUM CHLORIDE, POTASSIUM CHLORIDE, SODIUM LACTATE AND CALCIUM CHLORIDE: 600; 310; 30; 20 INJECTION, SOLUTION INTRAVENOUS at 09:28

## 2025-07-10 RX ADMIN — ONDANSETRON 4 MG: 2 INJECTION, SOLUTION INTRAMUSCULAR; INTRAVENOUS at 09:55

## 2025-07-10 RX ADMIN — SODIUM CHLORIDE, PRESERVATIVE FREE 10 ML: 5 INJECTION INTRAVENOUS at 08:07

## 2025-07-10 RX ADMIN — DEXAMETHASONE SODIUM PHOSPHATE 4 MG: 4 INJECTION, SOLUTION INTRAMUSCULAR; INTRAVENOUS at 09:52

## 2025-07-10 RX ADMIN — FENTANYL CITRATE 50 MCG: 50 INJECTION INTRAMUSCULAR; INTRAVENOUS at 09:44

## 2025-07-10 RX ADMIN — LIDOCAINE HYDROCHLORIDE 80 MG: 20 INJECTION, SOLUTION EPIDURAL; INFILTRATION; INTRACAUDAL; PERINEURAL at 09:34

## 2025-07-10 RX ADMIN — LIDOCAINE HYDROCHLORIDE 1 ML: 10 INJECTION, SOLUTION EPIDURAL; INFILTRATION; INTRACAUDAL; PERINEURAL at 08:07

## 2025-07-10 ASSESSMENT — PAIN DESCRIPTION - DESCRIPTORS: DESCRIPTORS: ACHING

## 2025-07-10 ASSESSMENT — PAIN - FUNCTIONAL ASSESSMENT
PAIN_FUNCTIONAL_ASSESSMENT: 0-10
PAIN_FUNCTIONAL_ASSESSMENT: NONE - DENIES PAIN

## 2025-07-10 ASSESSMENT — ENCOUNTER SYMPTOMS
GASTROINTESTINAL NEGATIVE: 1
BACK PAIN: 1
RESPIRATORY NEGATIVE: 1

## 2025-07-10 NOTE — ANESTHESIA POSTPROCEDURE EVALUATION
Department of Anesthesiology  Postprocedure Note    Patient: Rajani Iglesias  MRN: [id number]  YOB: 1960  Date of evaluation: 7/10/2025    Procedure Summary       Date: 07/10/25 Room / Location: [address]    Anesthesia Start: 0928 Anesthesia Stop: 1003    Procedure: DIAGNOSTIC COLONOSCOPY (Rectum) Diagnosis:       Ileostomy in place (HCC)      History of partial colectomy      (Ileostomy in place (HCC) [Z93.2])      (History of partial colectomy [Z90.49])    Surgeons: Iker Garcia MD Responsible Provider: Cirilo Mattson MD    Anesthesia Type: general ASA Status: 3            Anesthesia Type: No value filed.    Nadeem Phase I: Nadeem Score: 10    Nadeem Phase II: Nadeem Score: 10    Anesthesia Post Evaluation    Comments: POST- ANESTHESIA EVALUATION       Pt Name: Rajani Iglesias  MRN: [id number]  YOB: 1960  Date of evaluation: 7/10/2025  Time:  1:43 PM      /66   Pulse 85   Temp 98 °F (36.7 °C) (Infrared)   Resp 16   Ht 1.473 m (4' 10\")   Wt 58.5 kg (129 lb)   SpO2 98%   BMI 26.96 kg/m²      Consciousness Level  Awake  Cardiopulmonary Status  Stable  Pain Adequately Treated YES  Nausea / Vomiting  NO  Adequate Hydration  YES  Anesthesia Related Complications NONE      Electronically signed by Cirilo Mattson MD on 7/10/2025 at 1:43 PM           No notable events documented.

## 2025-07-10 NOTE — DISCHARGE INSTRUCTIONS
DISCHARGE INSTRUCTIONS FOR COLONOSCOPY    In order to continue your care at home, please follow the instructions below.    For General Anesthesia:  Do not drink any alcoholic beverages or make any legal or important decisions for 24 hours.    Do not drive or operate machinery for 24 hours.  You may return to work after 24 hours.        Diet    Drink plenty of fluids after surgery, unless you are on a fluid restriction.   After general anesthesia, start out eating lightly (broth, soup, bread, etc.) advancing as tolerated to your usual diet.  Try to avoid spicy or greasy/fatty foods for 24 hours.   Avoid milk/milk product for several hours.       Medications  Take medications as ordered by your surgeon.    Activities  Limit your activities for 24 hours.  Walk around to help pass gas.  You may shower.    Call your surgeon for the following:  If you have abdominal pain that is not relieved by passing gas.   For an oral temperature (by mouth) is 101 degrees or higher, chills or excessive sweating.  You have increasing and progressive bleeding or drainage from surgery area.  Persistent nausea or vomiting  Rectal bleeding (may be red, maroon, or black) or change in your bowel habits.  Redness or swelling at the IV site.  If you are unable to urinate within 8 hours of surgery.  For any questions or concerns you may have.    Outpatient follow-up in the office to discuss ileostomy reversal

## 2025-07-10 NOTE — H&P
(Infrared)   Resp 16   Ht 1.473 m (4' 10\")   Wt 58.5 kg (129 lb)   SpO2 99%   BMI 26.96 kg/m²  Body mass index is 26.96 kg/m².     GENERAL APPEARANCE:   Rajani Iglesias is 64 y.o.,  female, not obese, nourished, conscious, alert.  Does not appear to be distress or pain at this time.                            Physical Exam  Constitutional:       General: She is not in acute distress.     Appearance: Normal appearance. She is normal weight. She is not ill-appearing.   HENT:      Head: Normocephalic.      Right Ear: External ear normal.      Left Ear: External ear normal.      Nose: Nose normal.      Mouth/Throat:      Mouth: Mucous membranes are dry.   Eyes:      General:         Right eye: No discharge.         Left eye: No discharge.   Cardiovascular:      Rate and Rhythm: Normal rate and regular rhythm.      Pulses: Normal pulses.      Heart sounds: Normal heart sounds. No murmur heard.  Pulmonary:      Effort: Pulmonary effort is normal.      Breath sounds: Normal breath sounds.   Abdominal:      General: Bowel sounds are normal. There is no distension.      Palpations: Abdomen is soft. There is no mass.      Tenderness: There is no abdominal tenderness.      Comments: Ileostomy bag placed with Ostomy viable and functioning   Musculoskeletal:      Cervical back: Normal range of motion.   Skin:     General: Skin is warm and dry.   Neurological:      General: No focal deficit present.      Mental Status: She is alert and oriented to person, place, and time.      Motor: No weakness.      Gait: Gait normal.   Psychiatric:         Mood and Affect: Mood normal.         Behavior: Behavior normal.                   PROVISIONAL DIAGNOSES / SURGERY:      Ileostomy in place ([facility]) [Z93.2]  History of partial colectomy [Z90.49]    DIAGNOSTIC COLONOSCOPY     Patient Active Problem List    Diagnosis Date Noted    Diastolic heart failure (HCC) 12/14/2023    History of partial colectomy 06/06/2025    Recurrent UTI 02/13/2025     Other microscopic hematuria 02/13/2025    Polyclonal gammopathy determined by serum protein electrophoresis 01/31/2025    History of breast cancer 01/21/2025    Primary insomnia 12/12/2024    Hypomagnesemia 09/10/2024    Vitamin D deficiency 09/10/2024    Ileostomy in place (HCC) 07/05/2024    Anxiety with depression 12/13/2023    [place] neck deformity of cervical spine     Iron deficiency 01/01/2022    Normocytic anemia 12/31/2021    Lumbar stenosis with neurogenic claudication     Chronic gastritis without bleeding 07/30/2020    Ulcerative esophagitis 07/30/2020    Elevated CEA 07/27/2020    Post-menopausal osteoporosis 04/24/2018    Hypothyroidism 09/08/2016    Lumbar radiculopathy, chronic 09/08/2016    Post laminectomy syndrome 09/08/2016           CASSANDRA Phillips CNP on 7/10/2025 at 7:06 AM

## 2025-07-10 NOTE — ANESTHESIA PRE PROCEDURE
History:        Diagnosis Date   • Anxiety    • Arthritis    • At maximum risk for fall 12/29/2021    caudal equina syndrome and cervical stenosis   • Breast cancer ([facility])    • Brief psychotic disorder ([facility]) 01/05/2024   • Cancer ([facility])     right breast   • Cauda equina syndrome ([facility]) 12/29/2021    neuropathy, mobility difficulty, incontinance   • Cerebrovascular accident ([facility]) 01/01/2024   • Cervical stenosis of spine 12/29/2021   • GERD (gastroesophageal reflux disease)    • History of stomach ulcers    • History of therapeutic radiation    • Hx antineoplastic chemo    • Hypertension    • Hypothyroidism    • Lumbar neuritis    • NSTEMI (non-ST elevated myocardial infarction) ([facility]) 12/13/2023   • Post laminectomy syndrome    • Spinal deformity 11/2021    cervical and lumbar   • Thyroid disease    • Uses wheelchair    • Wears dentures    • Wellness examination     Dr. MARY LOU Brasher       Past Surgical History:        Procedure Laterality Date   • BACK SURGERY      lower back x 3, cervical- x 1: C4- C6, 11/4/2014    • BREAST SURGERY Right     mastectomy with reconstruction   • CERVICAL FUSION N/A 04/12/2022    C5 CORPECTOMY, USE OF INTRAOPERATIVE CERVICAL TRACTION performed by Linda Ludwig DO at [facility]   • CERVICAL FUSION N/A 04/12/2022    POSTERIOR FIXATION C2-C7 performed by Linda Ludwig DO at [facility] OR   • COLONOSCOPY  about 2018   • CT BIOPSY BONE MARROW  02/05/2025    CT BIOPSY BONE MARROW 2/5/2025 [facility] CT SCAN   • CYSTOSCOPY Bilateral 03/07/2025    CYSTOSCOPY RETROGRADE PYELOGRAM performed by Jony Moss MD at [facility] OR   • HERNIA REPAIR Bilateral     inguinal   • HYSTERECTOMY, TOTAL ABDOMINAL (CERVIX REMOVED)     • LUMBAR SPINE SURGERY N/A 12/30/2021    LUMBAR LAMINECTOMY L2-S1 performed by Linda Ludwig DO at [facility] OR   • MASTECTOMY, RADICAL     • OTHER SURGICAL HISTORY  04/12/2022    C5 CORPECTOMY, USE OF INTRAOPERATIVE CERVICAL TRACTION (N/A Spine Cervical)    • SIGMOID COLECTOMY N/A 07/02/2024    LAPAROTOMY

## 2025-07-10 NOTE — OP NOTE
[facility] Surgery   Iker Garcia MD, FACS  Sudha JONESAriella Lincoln, APRN-CNP  [address], [address]  [place] [address]  P: [phone], F: [phone]    PROCEDURE NOTE    DATE OF PROCEDURE: 7/10/2025    SURGEON: Iker Garcia MD    ASSISTANT: None    PREOPERATIVE DIAGNOSIS: History of colonic obstruction status post right hemicolectomy partial omentectomy with ileostomy creation by Dr. Bustillo 7/2/24    POSTOPERATIVE DIAGNOSIS: Fair prep.  Retained barium.  Sigmoid diverticulosis.  Divergent colitis and proctitis.  No obvious mass or large polypoid lesion visible.    OPERATION: Colonoscopy to transverse colon    ANESTHESIA: General    ESTIMATED BLOOD LOSS: None    COMPLICATIONS: None     SPECIMENS:  Was Not Obtained    HISTORY: The patient is a 64 y.o. year old female with history of above preop diagnosis.  I recommended colonoscopy with possible biopsy or polypectomy and I explained the risk, benefits, expected outcome, and alternatives to the procedure.  Risks included but are not limited to bleeding, infection, respiratory distress, hypotension, and perforation of the colon and possibility of missing a lesion.  The patient understands and is in agreement.      PROCEDURE: The patient was given IV conscious sedation.  The patient's SPO2 remained above 90% throughout the procedure. Digital rectal exam was normal.  The colonoscope was inserted through the anus into the rectum and advanced under direct vision to the cecum without difficulty.  Terminal ileum was examined for approximately 2 inches.  The prep was fair.      Findings:  Terminal ileum: not examined    Cecum/Ascending colon: Status post right hemicolectomy    Transverse colon: abnormal: Fair prep.  Divergent colitis    Descending/Sigmoid colon: abnormal: Divergent colitis.  Fair prep.  Sigmoid diverticulosis.  No obvious polypoid mass seen in the visualized portion    Rectum/Anus: examined in normal and retroflexed positions

## 2025-07-31 ENCOUNTER — OFFICE VISIT (OUTPATIENT)
Age: 65
End: 2025-07-31
Payer: MEDICARE

## 2025-07-31 VITALS
HEIGHT: 58 IN | OXYGEN SATURATION: 97 % | SYSTOLIC BLOOD PRESSURE: 114 MMHG | WEIGHT: 132.4 LBS | HEART RATE: 91 BPM | BODY MASS INDEX: 27.79 KG/M2 | TEMPERATURE: 98.2 F | DIASTOLIC BLOOD PRESSURE: 62 MMHG

## 2025-07-31 DIAGNOSIS — R93.3 ABNORMAL BARIUM ENEMA: ICD-10-CM

## 2025-07-31 DIAGNOSIS — K81.1 CHRONIC CHOLECYSTITIS: ICD-10-CM

## 2025-07-31 DIAGNOSIS — Z86.79 HISTORY OF CORONARY ARTERY DISEASE: ICD-10-CM

## 2025-07-31 DIAGNOSIS — K57.30 SIGMOID DIVERTICULOSIS: ICD-10-CM

## 2025-07-31 DIAGNOSIS — Z93.2 ILEOSTOMY IN PLACE (HCC): ICD-10-CM

## 2025-07-31 DIAGNOSIS — R39.81 FUNCTIONAL URINARY INCONTINENCE: ICD-10-CM

## 2025-07-31 DIAGNOSIS — Z90.49 HISTORY OF PARTIAL COLECTOMY: ICD-10-CM

## 2025-07-31 DIAGNOSIS — R10.32 LEFT LOWER QUADRANT PAIN: Primary | ICD-10-CM

## 2025-07-31 PROCEDURE — 1036F TOBACCO NON-USER: CPT | Performed by: SURGERY

## 2025-07-31 PROCEDURE — 3017F COLORECTAL CA SCREEN DOC REV: CPT | Performed by: SURGERY

## 2025-07-31 PROCEDURE — G8417 CALC BMI ABV UP PARAM F/U: HCPCS | Performed by: SURGERY

## 2025-07-31 PROCEDURE — G8427 DOCREV CUR MEDS BY ELIG CLIN: HCPCS | Performed by: SURGERY

## 2025-07-31 PROCEDURE — 99214 OFFICE O/P EST MOD 30 MIN: CPT | Performed by: SURGERY

## 2025-07-31 NOTE — PROGRESS NOTES
[facility] [place] General Surgery   Iker Garcia MD, FACS  Sudha Stark, APRN-CNP  [address], [address]  [place] [address]  P: [phone], F: [phone]    General and Robotic Surgery  Follow-Up Visit Note               PATIENT NAME: Rajani Iglesias   :  1960   MRN: [id number]   PCP:  Kailey Pepe MD     TODAY'S DATE: 8/3/2025    Chief Complaint   Patient presents with    Follow-up     SC/ Follow up cscope on 7/10 and discuss ileostomy reversal. The pt did notice bloody discharge after the colonoscopy.        History of Present Illness  The patient presents for evaluation of left lower quadrant pain.    She reports experiencing pain in the left lower quadrant. She is not currently on any anticoagulant therapy. She has no known allergies to intravenous dye. Her cardiologist,has provided some information regarding her condition. She underwent a right colectomy performed by Dr. Bustillo in 2024 due to a colonic obstruction, which resulted in an ileostomy. This is her only abdominal surgery.    EXAMINATION:  SINGLE CONTRAST BARIUM ENEMA  3/27/2025     TECHNIQUE:  Barium enema was performed with thin barium contrast.        FLUOROSCOPY DOSE AND TYPE:     Radiation Exposure Index: Air Kerma, 22.5 mGy; 23 seconds fluoro time; 18  images obtained including overheads.     COMPARISON:  None.     HISTORY:  ORDERING SYSTEM PROVIDED HISTORY: Ileostomy in place (HCC)  TECHNOLOGIST PROVIDED HISTORY:  Use gastrograffin, this will be unprepped gastrograffin enema.  S/p Right colectomy and partial omentectomy with ileostomy creation, hx of  bowel obstruction  Reason for Exam: S/p Right colectomy and partial omentectomy with ileostomy  creation     FINDINGS:  Water-soluble contrast was introduced into the colon under fluoroscopic  observation in a retrograde fashion per indwelling rectal balloon.  Free flow  of contrast material was noted in a retrograde fashion to the proximal  transverse colon point

## 2025-07-31 NOTE — PATIENT INSTRUCTIONS
Please call [phone] to schedule your testing.         Patient was seen and examined after the colonoscopy.  At this point I will obtain follow-up CT scan of the abdomen and pelvis with contrast.  This was ordered.  Medical and cardiac clearance.  Preadmission testing at [facility].  She does not take any blood thinners.  We should proceed with exploratory laparotomy ileostomy reversal/ileocolic anastomosis under general anesthesia at [facility].  Discussed with the patient.  She is in agreement.

## 2025-08-01 RX ORDER — TAMSULOSIN HYDROCHLORIDE 0.4 MG/1
0.4 CAPSULE ORAL DAILY
Qty: 30 CAPSULE | Refills: 3 | Status: SHIPPED | OUTPATIENT
Start: 2025-08-01

## 2025-08-02 DIAGNOSIS — F41.8 ANXIETY WITH DEPRESSION: ICD-10-CM

## 2025-08-03 DIAGNOSIS — F51.01 PRIMARY INSOMNIA: ICD-10-CM

## 2025-08-04 RX ORDER — QUETIAPINE FUMARATE 25 MG/1
TABLET, FILM COATED ORAL
Qty: 30 TABLET | Refills: 1 | Status: SHIPPED | OUTPATIENT
Start: 2025-08-04

## 2025-08-04 RX ORDER — HYDROXYZINE HYDROCHLORIDE 10 MG/1
TABLET, FILM COATED ORAL
Qty: 60 TABLET | Refills: 0 | Status: SHIPPED | OUTPATIENT
Start: 2025-08-04

## 2025-08-06 ENCOUNTER — HOSPITAL ENCOUNTER (OUTPATIENT)
Dept: CT IMAGING | Age: 65
Discharge: HOME | End: 2025-08-08
Attending: SURGERY
Payer: MEDICARE

## 2025-08-06 DIAGNOSIS — R10.32 LEFT LOWER QUADRANT PAIN: ICD-10-CM

## 2025-08-06 LAB
EGFR, POC: 56 ML/MIN/1.73M2
POC CREATININE: 1.1 MG/DL (ref 0.51–1.19)

## 2025-08-06 PROCEDURE — 2500000003 HC RX 250 WO HCPCS: Performed by: SURGERY

## 2025-08-06 PROCEDURE — 74177 CT ABD & PELVIS W/CONTRAST: CPT

## 2025-08-06 PROCEDURE — 6360000004 HC RX CONTRAST MEDICATION: Performed by: SURGERY

## 2025-08-06 PROCEDURE — 2580000003 HC RX 258: Performed by: SURGERY

## 2025-08-06 PROCEDURE — 82565 ASSAY OF CREATININE: CPT

## 2025-08-06 RX ORDER — IOPAMIDOL 755 MG/ML
75 INJECTION, SOLUTION INTRAVASCULAR
Status: COMPLETED | OUTPATIENT
Start: 2025-08-06 | End: 2025-08-06

## 2025-08-06 RX ORDER — 0.9 % SODIUM CHLORIDE 0.9 %
100 INTRAVENOUS SOLUTION INTRAVENOUS ONCE
Status: COMPLETED | OUTPATIENT
Start: 2025-08-06 | End: 2025-08-06

## 2025-08-06 RX ORDER — SODIUM CHLORIDE 0.9 % (FLUSH) 0.9 %
10 SYRINGE (ML) INJECTION PRN
Status: DISCONTINUED | OUTPATIENT
Start: 2025-08-06 | End: 2025-08-09 | Stop reason: HOSPADM

## 2025-08-06 RX ADMIN — SODIUM CHLORIDE 100 ML: 9 INJECTION, SOLUTION INTRAVENOUS at 12:18

## 2025-08-06 RX ADMIN — SODIUM CHLORIDE, PRESERVATIVE FREE 10 ML: 5 INJECTION INTRAVENOUS at 12:18

## 2025-08-06 RX ADMIN — BARIUM SULFATE 450 ML: 20 SUSPENSION ORAL at 10:36

## 2025-08-06 RX ADMIN — IOPAMIDOL 75 ML: 755 INJECTION, SOLUTION INTRAVENOUS at 12:18

## 2025-08-25 DIAGNOSIS — R00.0 SINUS TACHYCARDIA: ICD-10-CM

## 2025-08-25 RX ORDER — METOPROLOL TARTRATE 25 MG/1
TABLET, FILM COATED ORAL
Qty: 60 TABLET | Refills: 0 | Status: SHIPPED | OUTPATIENT
Start: 2025-08-25

## 2025-08-27 ENCOUNTER — TELEPHONE (OUTPATIENT)
Age: 65
End: 2025-08-27

## 2025-08-27 DIAGNOSIS — Z01.818 PREOP EXAMINATION: Primary | ICD-10-CM

## 2025-08-27 PROBLEM — R10.32 LEFT LOWER QUADRANT PAIN: Status: ACTIVE | Noted: 2025-08-27

## 2025-08-27 PROBLEM — Z90.49 STATUS POST COLECTOMY: Status: ACTIVE | Noted: 2025-08-27

## 2025-08-30 DIAGNOSIS — K21.9 GASTROESOPHAGEAL REFLUX DISEASE, UNSPECIFIED WHETHER ESOPHAGITIS PRESENT: ICD-10-CM

## 2025-09-01 DIAGNOSIS — F41.8 ANXIETY WITH DEPRESSION: ICD-10-CM

## 2025-09-02 RX ORDER — PANTOPRAZOLE SODIUM 40 MG/1
40 TABLET, DELAYED RELEASE ORAL DAILY
Qty: 90 TABLET | Refills: 1 | Status: SHIPPED | OUTPATIENT
Start: 2025-09-02

## 2025-09-02 RX ORDER — HYDROXYZINE HYDROCHLORIDE 10 MG/1
TABLET, FILM COATED ORAL
Qty: 60 TABLET | Refills: 0 | Status: SHIPPED | OUTPATIENT
Start: 2025-09-02

## (undated) DEVICE — SOLUTION IV 1000 ML 0.9 NACL INJ USP EXCEL PLAS CONTAINER

## (undated) DEVICE — GLOVE ORTHO 7 1/2   MSG9475

## (undated) DEVICE — CONNECTOR TBNG WHT PLAS SUCT STR 5IN1 LTWT W/ M CONN

## (undated) DEVICE — SYRINGE IRRIG 60ML SFT PLIABLE BLB EZ TO GRP 1 HND USE W/

## (undated) DEVICE — GLOVE SURG SZ 65 THK91MIL LTX FREE SYN POLYISOPRENE

## (undated) DEVICE — SPONGE,NEURO,0.5"X3",XR,STRL,LF,10/PK: Brand: MEDLINE

## (undated) DEVICE — BLADE ES ELASTOMERIC COAT INSUL DURABLE BEND UPTO 90DEG

## (undated) DEVICE — CONNECTOR,TUBING,5-IN-1,NON-STERILE: Brand: MEDLINE INDUSTRIES, INC.

## (undated) DEVICE — DRESSING BORDERED ADH GZ UNIV GEN USE 8INX4IN AND 6INX2IN

## (undated) DEVICE — SEALANT TISS 10 CC FIBRIN VISTASEAL

## (undated) DEVICE — NEEDLE HYPO 25GA L1.5IN BLU POLYPR HUB S STL REG BVL STR

## (undated) DEVICE — DRAPE,LAP,CHOLE,W/TROUGHS,STERILE: Brand: MEDLINE

## (undated) DEVICE — SUTURE VCRL SZ 0 L18IN ABSRB UD L36MM CT-1 1/2 CIR J840D

## (undated) DEVICE — 3.0MM PRECISION NEURO (MATCH HEAD)

## (undated) DEVICE — TOTAL TRAY, 16FR 10ML SIL FOLEY, URN: Brand: MEDLINE

## (undated) DEVICE — SPONGE,PEANUT,XRAY,ST,SM,3/8",5/CARD: Brand: MEDLINE INDUSTRIES, INC.

## (undated) DEVICE — SUTURE PROL SZ 1 L30IN NONABSORBABLE BLU L36MM CT-1 1/2 CIR 8425H

## (undated) DEVICE — GAUZE,SPONGE,FLUFF,6"X6.75",STRL,5/TRAY: Brand: MEDLINE

## (undated) DEVICE — DRAPE,UTILTY,TAPE,15X26, 4EA/PK: Brand: MEDLINE

## (undated) DEVICE — ST CHARLES MAJOR ABDOMINAL: Brand: MEDLINE INDUSTRIES, INC.

## (undated) DEVICE — GOWN,AURORA,NONREINFORCED,LARGE: Brand: MEDLINE

## (undated) DEVICE — SPONGE,NEURO,0.5"X0.5",XR,STRL,10/PK: Brand: MEDLINE

## (undated) DEVICE — YANKAUER,FLEXIBLE HANDLE,REGLR CAPACITY: Brand: MEDLINE INDUSTRIES, INC.

## (undated) DEVICE — INTENDED FOR TISSUE SEPARATION, AND OTHER PROCEDURES THAT REQUIRE A SHARP SURGICAL BLADE TO PUNCTURE OR CUT.: Brand: BARD-PARKER ® CARBON RIB-BACK BLADES

## (undated) DEVICE — ABS MED DISTRACTION PIN , 12MM POUCH
Type: IMPLANTABLE DEVICE | Site: SPINE CERVICAL | Status: NON-FUNCTIONAL
Brand: ABS MED DISTRACTION PIN
Removed: 2022-04-12

## (undated) DEVICE — SINGLE USE AIR/WATER, SUCTION AND BIOPSY VALVES SET: Brand: ORCAPOD™

## (undated) DEVICE — ELECTRODE PT RET AD L9FT HI MOIST COND ADH HYDRGEL CORDED

## (undated) DEVICE — POUCH INSTR W6.75XL11.5IN FRST 2 PKT ADH FOR ORTH AND

## (undated) DEVICE — SUTURE VCRL SZ 2-0 L18IN ABSRB UD CT-1 L36MM 1/2 CIR J839D

## (undated) DEVICE — CONE TIP URETERAL CATHETER: Brand: URETERAL CATHETER

## (undated) DEVICE — MARKER,SKIN,WI/RULER AND LABELS: Brand: MEDLINE

## (undated) DEVICE — NEPTUNE E-SEP SMOKE EVACUATION PENCIL, COATED, 70MM BLADE, PUSH BUTTON SWITCH: Brand: NEPTUNE E-SEP

## (undated) DEVICE — 1000 S-DRAPE TOWEL DRAPE 10/BX: Brand: STERI-DRAPE™

## (undated) DEVICE — PAD,ABDOMINAL,8"X7.5",ST,LF,20/BX: Brand: MEDLINE INDUSTRIES, INC.

## (undated) DEVICE — BLANKET WRM W29.9XL79.1IN UP BODY FORC AIR MISTRAL-AIR

## (undated) DEVICE — GAUZE,SPONGE,4"X4",16PLY,STRL,LF,10/TRAY: Brand: MEDLINE

## (undated) DEVICE — KIT CLN UP LIN W/ STD SAHARA TBL SHT 40X60IN DRAW/LIFT SHT

## (undated) DEVICE — PACK PROCEDURE SURG LUMBAR SPINE SVMMC

## (undated) DEVICE — TOOL 14MH30 LEGEND 14CM 3MM: Brand: MIDAS REX ™

## (undated) DEVICE — KIT DRN FLAT W/ 100CC EVAC 7MM FULL PERF

## (undated) DEVICE — GOWN,SIRUS,NONRNF,SETINSLV,XL,20/CS: Brand: MEDLINE

## (undated) DEVICE — DRAPE,REIN 53X77,STERILE: Brand: MEDLINE

## (undated) DEVICE — SOLUTION IRRIG 1000ML 0.9% SOD CHL USP POUR PLAS BTL

## (undated) DEVICE — COLLAR CERV UNIV AD L13-19IN TRACH OPN TWO PC RIG POLYETH

## (undated) DEVICE — GOWN,POLY REINFORCED,LG: Brand: MEDLINE

## (undated) DEVICE — SYRINGE MED 10ML TRNSLUC BRL PLUNG BLK MRK POLYPR CTRL

## (undated) DEVICE — SYRINGE,CONTROL,LL,FINGER,GRIP: Brand: MEDLINE INDUSTRIES, INC.

## (undated) DEVICE — RELOAD STPL L75MM OPN H3.8MM CLS 1.5MM WIRE DIA0.2MM REG

## (undated) DEVICE — SYRINGE MED 20ML STD CLR PLAS LUERLOCK TIP N CTRL DISP

## (undated) DEVICE — ADHESIVE SKIN CLOSURE TOP 36 CC HI VISC DERMBND MINI

## (undated) DEVICE — COVER,MAYO STAND,STERILE: Brand: MEDLINE

## (undated) DEVICE — 3M™ IOBAN™ 2 ANTIMICROBIAL INCISE DRAPE 6650EZ: Brand: IOBAN™ 2

## (undated) DEVICE — Device: Brand: SNAP ON SPHERZ

## (undated) DEVICE — PATIENT RETURN ELECTRODE, SINGLE-USE, CONTACT QUALITY MONITORING, ADULT, WITH 9FT CORD, FOR PATIENTS WEIGING OVER 33LBS. (15KG): Brand: MEGADYNE

## (undated) DEVICE — GLOVE ORANGE PI 7 1/2   MSG9075

## (undated) DEVICE — SUTURE PDS II SZ 0 L60IN ABSRB VLT L48MM CTX 1/2 CIR Z990G

## (undated) DEVICE — DRAPE MICSCP W117XL305CM DIA65MM LENS W VARI LENS2 FOR LEICA

## (undated) DEVICE — Device

## (undated) DEVICE — STERILE POLYISOPRENE POWDER-FREE SURGICAL GLOVES WITH EMOLLIENT COATING: Brand: PROTEXIS

## (undated) DEVICE — DISPOSABLE BIPOLAR FORCEPS 7" (17.8CM) COHEN BAYONET, INSULATED, IRRIGATING, 1.5MM TIP: Brand: KIRWAN

## (undated) DEVICE — GLOVE ORANGE PI 7   MSG9070

## (undated) DEVICE — TUBING, SUCTION, 9/32" X 20', STRAIGHT: Brand: MEDLINE INDUSTRIES, INC.

## (undated) DEVICE — PROTECTOR ULN NRV PUR FOAM HK LOOP STRP ANATOMICALLY

## (undated) DEVICE — DRILL BIT NAVG3606010 NAVIGATED: Brand: NAVIGATED INFINITY™ OCCIPITOCERVICAL UPPER THORACIC SYSTEM

## (undated) DEVICE — SOLUTION PREP POVIDONE IOD FOR SKIN MUCOUS MEM PRIOR TO

## (undated) DEVICE — ST CHARLES CYSTO: Brand: MEDLINE INDUSTRIES, INC.

## (undated) DEVICE — SINGLE PORT MANIFOLD: Brand: NEPTUNE 2

## (undated) DEVICE — 3.0MM DIAMOND NEURO (MATCH HEAD)

## (undated) DEVICE — GLOVE,EXAM,NITRILE,RESTORE,OAT SENSE,L: Brand: MEDLINE

## (undated) DEVICE — BUR SURG OD30MM DMND RND EXTRA COARSE TAPR N FLUT ST FOR

## (undated) DEVICE — APPLICATOR MEDICATED 10.5 CC SOLUTION HI LT ORNG CHLORAPREP

## (undated) DEVICE — C-ARM: Brand: UNBRANDED

## (undated) DEVICE — SEALER ENDOSCP NANO COAT OPN DIV CRV L JAW LIGASURE IMPACT

## (undated) DEVICE — AGENT HEMOSTATIC SURGIFLOW MATRIX KIT W/THROMBIN

## (undated) DEVICE — NEEDLE SPNL 18GA L3.5IN W/ QNCKE SHARPER BVL DURA CLICK

## (undated) DEVICE — SUTURE MCRYL SZ 3-0 L18IN ABSRB UD L19MM PS-2 3/8 CIR PRIM Y497G

## (undated) DEVICE — GLOVE EXAM M L95IN FNGR THK35MIL PALM THK24MIL OFF WHT

## (undated) DEVICE — DRAPE,THYROID,SOFT,STERILE: Brand: MEDLINE

## (undated) DEVICE — STAPLER INT L75MM CUT LN L73MM STPL LN L77MM BLU B FRM 8

## (undated) DEVICE — GAUZE,SPONGE,4"X4",16PLY,XRAY,STRL,LF: Brand: MEDLINE

## (undated) DEVICE — SOLUTION IRRIG 1000ML STRL H2O USP PLAS POUR BTL

## (undated) DEVICE — DISPOSABLE BIPOLAR FORCEPS 9" (22.9CM) COHEN BAYONET, INSULATED, IRRIGATING, 0.5MM TIP: Brand: KIRWAN

## (undated) DEVICE — SUTURE VICRYL + SZ 3-0 L27IN ABSRB UD L26MM SH 1/2 CIR VCP416H

## (undated) DEVICE — BLADE ES L6IN ELASTOMERIC COAT EXT DURABLE BEND UPTO 90DEG

## (undated) DEVICE — APPLICATOR MEDICATED 26 CC SOLUTION HI LT ORNG CHLORAPREP

## (undated) DEVICE — ENDOSCOPIC KIT 1.1+ 10 FT OP4 CA DE

## (undated) DEVICE — SPONGE LAP W18XL18IN WHT COT 4 PLY FLD STRUNG RADPQ DISP ST

## (undated) DEVICE — SUTURE STRATAFIX SYMMETRIC PDS + SZ 0 L18IN ABSRB L36MM SXPP1A401

## (undated) DEVICE — DISPOSABLE IRRIGATION BIPOLAR CORD, M1000 TYPE: Brand: KIRWAN

## (undated) DEVICE — BLADE CLP TAPR HD WET DRY CAPABILITY GTT IN CHARGING USE